# Patient Record
Sex: MALE | Race: BLACK OR AFRICAN AMERICAN | NOT HISPANIC OR LATINO | Employment: OTHER | URBAN - METROPOLITAN AREA
[De-identification: names, ages, dates, MRNs, and addresses within clinical notes are randomized per-mention and may not be internally consistent; named-entity substitution may affect disease eponyms.]

---

## 2017-01-06 DIAGNOSIS — M79.605 LEFT LEG PAIN: ICD-10-CM

## 2017-01-06 DIAGNOSIS — G54.6 PHANTOM LIMB PAIN: ICD-10-CM

## 2017-01-06 RX ORDER — GABAPENTIN 600 MG/1
600 TABLET ORAL 3 TIMES DAILY
Qty: 180 TABLET | Refills: 1 | Status: SHIPPED | OUTPATIENT
Start: 2017-01-06 | End: 2017-03-23

## 2017-01-12 ENCOUNTER — HOSPITAL ENCOUNTER (OUTPATIENT)
Facility: HOSPITAL | Age: 52
Discharge: HOME | End: 2017-01-12
Attending: INTERNAL MEDICINE | Admitting: INTERNAL MEDICINE
Payer: MEDICARE

## 2017-01-12 VITALS
HEART RATE: 72 BPM | TEMPERATURE: 96 F | HEIGHT: 66 IN | WEIGHT: 178 LBS | OXYGEN SATURATION: 97 % | RESPIRATION RATE: 16 BRPM | BODY MASS INDEX: 28.61 KG/M2 | DIASTOLIC BLOOD PRESSURE: 93 MMHG | SYSTOLIC BLOOD PRESSURE: 168 MMHG

## 2017-01-12 DIAGNOSIS — T82.858D STENOSIS OF ARTERIOVENOUS DIALYSIS FISTULA, SUBSEQUENT ENCOUNTER: Primary | ICD-10-CM

## 2017-01-12 PROCEDURE — 25000003 PHARM REV CODE 250: Performed by: INTERNAL MEDICINE

## 2017-01-12 PROCEDURE — 25000003 PHARM REV CODE 250

## 2017-01-12 PROCEDURE — 63600175 PHARM REV CODE 636 W HCPCS

## 2017-01-12 PROCEDURE — 36901 INTRO CATH DIALYSIS CIRCUIT: CPT | Mod: ,,, | Performed by: INTERNAL MEDICINE

## 2017-01-12 RX ORDER — CLONIDINE HYDROCHLORIDE 0.1 MG/1
0.1 TABLET ORAL ONCE
Status: COMPLETED | OUTPATIENT
Start: 2017-01-12 | End: 2017-01-12

## 2017-01-12 RX ADMIN — CLONIDINE HYDROCHLORIDE 0.1 MG: 0.1 TABLET ORAL at 08:01

## 2017-01-12 NOTE — PROGRESS NOTES
Pt verbalized an understanding of discharge instructions including diet, s/s to notify md, post procedure care, and follow up.  Right upper arm +thrill and bruit dressing clean, dry and intact. +pulse, no hematoma. Pt refused wheel chair and ambulated off unit.

## 2017-01-12 NOTE — DISCHARGE SUMMARY
[facility]  Discharge Note  Short Stay    Admit Date: 1/12/2017    Discharge Date and Time: No discharge date for patient encounter.     Attending Physician: Grayson Hubbard MD     Discharge Provider: Grayson Hubbard    Diagnoses: Fistulagram of the right BC AVF showed no evidence of stenosis.  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       Discharged Condition: good    Hospital Course: Patient was admitted for an outpatient procedure and tolerated the procedure well with no complications.    Final Diagnoses: Same as principal problem.    Disposition: Home or Self Care    Follow up/Patient Instructions:    Medications:  Reconciled Home Medications:   Current Discharge Medication List      CONTINUE these medications which have NOT CHANGED    Details   amlodipine (NORVASC) 10 MG tablet Take 1 tablet (10 mg total) by mouth every morning.  Qty: 90 tablet, Refills: 3      carvedilol (COREG) 12.5 MG tablet Take 1 tablet (12.5 mg total) by mouth 2 (two) times daily.  Qty: 60 tablet, Refills: 11      cinacalcet (SENSIPAR) 60 MG Tab Take 1 tablet (60 mg total) by mouth daily with breakfast.  Qty: 30 tablet, Refills: 3      docusate sodium (COLACE) 100 MG capsule Take 1 capsule (100 mg total) by mouth 2 (two) times daily.  Refills: 0      duloxetine (CYMBALTA) 30 MG capsule Take 1 capsule (30 mg total) by mouth once daily. In 1-2 weeks, if no relief, increase to 2 capsules once daily. Call for refills.  Qty: 30 capsule, Refills: 0    Associated Diagnoses: Phantom limb pain; Diabetic polyneuropathy associated with type 2 diabetes mellitus      gabapentin (NEURONTIN) 100 MG capsule Take 1 capsule (100 mg total) by mouth 3 (three) times daily.  Qty: 90 capsule, Refills: 3      lanthanum (FOSRENOL) 1000 MG chewable tablet Take 1 tablet (1,000 mg total)  by mouth 3 (three) times daily with meals.  Qty: 60 tablet, Refills: 11    Comments: Please dispense the powder form equivalent to the above sig. The electronic medical record has not updated the pharmacy to include the powder form. Thank you      lisinopril (PRINIVIL,ZESTRIL) 20 MG tablet Take 1 tablet (20 mg total) by mouth once daily.  Qty: 90 tablet, Refills: 3      gabapentin (NEURONTIN) 600 MG tablet Take 1 tablet (600 mg total) by mouth 3 (three) times daily.  Qty: 180 tablet, Refills: 1    Associated Diagnoses: Phantom limb pain; Left leg pain      ketoconazole (NIZORAL) 2 % shampoo Wash hair and affected areas of skin with medicated shampoo at least 2x/week - let sit on skin at least 5 minutes prior to rinsing  Qty: 120 mL, Refills: 5    Associated Diagnoses: Pityrosporum folliculitis      pantoprazole (PROTONIX) 40 MG tablet Take 1 tablet (40 mg total) by mouth once daily.  Qty: 30 tablet, Refills: 3      sodium chloride (OCEAN) 0.65 % nasal spray 1 spray by Nasal route 2 (two) times daily.  Refills: 12      tramadol (ULTRAM) 50 mg tablet Take 1 tablet (50 mg total) by mouth 3 (three) times daily as needed for Pain.  Qty: 90 tablet, Refills: 2    Associated Diagnoses: Chronic midline low back pain with left-sided sciatica           No discharge procedures on file.      Discharge Procedure Orders; Resume previous diet and activity. Follow up as needed.

## 2017-01-12 NOTE — H&P
[facility]  History & Physical    Subjective:      Chief Complaint/Reason for Admission:Prolonged bleeding after HD needle withdrawal.     Vaughn Retana is a 51 y.o. male with right BC AVF here for evaluation of prolonged bleeding after HD needle with drawl. He denies any other symptoms.    Past Medical History   Diagnosis Date    Acute on chronic diastolic CHF (congestive heart failure) 9/14/2016    Anemia     Chronic low back pain 12/1/2015    Diabetes mellitus, type II     Diabetic neuropathy     ESRD on hemodialysis     Hepatitis C      states hepatitis B    Hypertension     Left basal ganglia ICH 5/6/2013    Metabolic acidosis, IAG, reduced excretion of inorganic acids     Myoclonic jerking 9/20/2016    Obesity     Secondary hyperparathyroidism (of renal origin)     TB lung, latent 8/25/2015    Thyroid disease      Past Surgical History   Procedure Laterality Date    R avf  9/12/12    Leg amputation through knee  12/18/2013     left BKA    Foot amputation through metatarsal       left foot    Colonoscopy       Family History   Problem Relation Age of Onset    Early death Mother     Kidney disease Father     Hypertension Father     Heart disease Father     Hyperlipidemia Father     Diabetes Brother     Alcohol abuse Maternal Grandmother     Diabetes Maternal Grandfather     Melanoma Neg Hx      Social History   Substance Use Topics    Smoking status: Former Smoker     Packs/day: 1.00     Years: 10.00    Smokeless tobacco: Never Used    Alcohol use No       PTA Medications   Medication Sig    amlodipine (NORVASC) 10 MG tablet Take 1 tablet (10 mg total) by mouth every morning.    carvedilol (COREG) 12.5 MG tablet Take 1 tablet (12.5 mg total) by mouth 2 (two) times daily.    cinacalcet (SENSIPAR) 60 MG Tab Take 1 tablet (60 mg total) by mouth daily with breakfast.    docusate sodium (COLACE) 100 MG capsule Take 1 capsule (100 mg total) by mouth 2 (two)  times daily.    duloxetine (CYMBALTA) 30 MG capsule Take 1 capsule (30 mg total) by mouth once daily. In 1-2 weeks, if no relief, increase to 2 capsules once daily. Call for refills.    gabapentin (NEURONTIN) 100 MG capsule Take 1 capsule (100 mg total) by mouth 3 (three) times daily.    lanthanum (FOSRENOL) 1000 MG chewable tablet Take 1 tablet (1,000 mg total) by mouth 3 (three) times daily with meals.    lisinopril (PRINIVIL,ZESTRIL) 20 MG tablet Take 1 tablet (20 mg total) by mouth once daily.    gabapentin (NEURONTIN) 600 MG tablet Take 1 tablet (600 mg total) by mouth 3 (three) times daily.    ketoconazole (NIZORAL) 2 % shampoo Wash hair and affected areas of skin with medicated shampoo at least 2x/week - let sit on skin at least 5 minutes prior to rinsing    pantoprazole (PROTONIX) 40 MG tablet Take 1 tablet (40 mg total) by mouth once daily.    sodium chloride (OCEAN) 0.65 % nasal spray 1 spray by Nasal route 2 (two) times daily.    tramadol (ULTRAM) 50 mg tablet Take 1 tablet (50 mg total) by mouth 3 (three) times daily as needed for Pain.     Review of patient's allergies indicates:  No Known Allergies     ROS Constitutional: No fever or chills, no weight changes.  Eyes: No visual changes or photophobia  HEENT: No nasal congestion or sore throat  Respiratory: No cough or shortness of breath  Cardiovascular: No chest pain or palpitations  Gastrointestinal: Good appetite, no nausea or vomiting, no change in bowel habits  Genitourinary: No hematuria or dysuria  Skin: No rash or pruritis  Hematologic/lymphatic: No easy bruising, bleeding or lymphadenopathy  Musculoskeletal: No arthralgias or myalgias  Neurological: No seizures or tremors  Endocrine: No heat/cold intolerance.  No polyuria/polydipsia.  Psychiatric:  No depression or anxiety.    Objective:      Vital Signs (Most Recent)  Temp: 97.7 °F (36.5 °C) (01/12/17 0754)  Pulse: 76 (01/12/17 0754)  Resp: 16 (01/12/17 0754)  BP: (!) 199/103  (01/12/17 0754)  SpO2: 99 % (01/12/17 0754)    Vital Signs Range (Last 24H):  Temp:  [97.7 °F (36.5 °C)]   Pulse:  [76]   Resp:  [16]   BP: (199)/(103)   SpO2:  [99 %]     Physical Exam General appearance: Well developed, well nourished  Head: Normocephalic, atraumatic  Eyes:  Conjunctivae nl. Sclera anicteric. PERRL.  HEENT: Lips, mucosa, and tongue normal; teeth and gums normal and oropharynx clear.  Neck: Supple, trachea midline, thyroid not enlarged,   Lungs: Clear to auscultation bilaterally and normal respiratory effort  Heart: Regular rate and rhythm, S1, S2 normal, no murmur, click, rub or gallop  Abdomen: Soft, non-tender non-distended; bowel sounds normal; no masses,  no organomegaly  Extremities: No cyanosis or clubbing. No edema  Pulses: 2+ and symmetric  Skin: Skin color, texture, turgor normal. No rashes or lesions  Lymph nodes: Cervical, supraclavicular, and axillary nodes normal.  Neurologic: Normal strength and tone. No focal numbness or weakness  Psychiatric:  Alert and oriented times 3.  Affect appropriate.  Right BC AVF; water hammer pulse,High pitched dis cotinous systolic murmur.       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       Plan:    1. Fistulagram with PTA

## 2017-01-12 NOTE — PLAN OF CARE
Problem: Patient Care Overview  Goal: Plan of Care Review  Received report from ISMAEL Davis. Pt received from Cath lab via stretcher. Patient  AAOx3. VSS, no distress noted. Resp even and unlabored. Right upper arm dressing C/D/I. +pulses, No active bleeding. No hematoma noted. +thrill and bruit. Post procedure protocol reviewed with patient and patient's family. Understanding verbalized. Family members at bedside. Nurse call bell within reach. Will continue to monitor per post procedure protocol.

## 2017-01-12 NOTE — IP AVS SNAPSHOT
[facility]  [address]  [facility] [address]  Phone: [phone]           Patient Discharge Instructions     Our goal is to set you up for success. This packet includes information on your condition, medications, and your home care. It will help you to care for yourself so you don't get sicker and need to go back to the hospital.     Please ask your nurse if you have any questions.        There are many details to remember when preparing to leave the hospital. Here is what you will need to do:    1. Take your medicine. If you are prescribed medications, review your Medication List in the following pages. You may have new medications to  at the pharmacy and others that you'll need to stop taking. Review the instructions for how and when to take your medications. Talk with your doctor or nurses if you are unsure of what to do.     2. Go to your follow-up appointments. Specific follow-up information is listed in the following pages. Your may be contacted by a transition nurse or clinical provider about future appointments. Be sure we have all of the phone numbers to reach you, if needed. Please contact your provider's office if you are unable to make an appointment.     3. Watch for warning signs. Your doctor or nurse will give you detailed warning signs to watch for and when to call for assistance. These instructions may also include educational information about your condition. If you experience any of warning signs to your health, call your doctor.               [facility]  Unless otherwise directed by your provider, please contact [facility], our nurse care line that is available for 24/7 assistance.     [phone] (toll-free)    Registered nurses in the [facility] provide clinical advisement, health education, appointment booking, and other advisory services.                    ** Verify the list of medication(s) below is accurate and up  to date. Carry this with you in case of emergency. If your medications have changed, please notify your healthcare provider.             Medication List      ASK your doctor about these medications        Additional Info                      amlodipine 10 MG tablet   Commonly known as:  NORVASC   Quantity:  90 tablet   Refills:  3   Dose:  10 mg    Instructions:  Take 1 tablet (10 mg total) by mouth every morning.     Begin Date    AM    Noon    PM    Bedtime       carvedilol 12.5 MG tablet   Commonly known as:  COREG   Quantity:  60 tablet   Refills:  11   Dose:  12.5 mg    Instructions:  Take 1 tablet (12.5 mg total) by mouth 2 (two) times daily.     Begin Date    AM    Noon    PM    Bedtime       cinacalcet 60 MG Tab   Commonly known as:  SENSIPAR   Quantity:  30 tablet   Refills:  3   Dose:  60 mg    Instructions:  Take 1 tablet (60 mg total) by mouth daily with breakfast.     Begin Date    AM    Noon    PM    Bedtime       docusate sodium 100 MG capsule   Commonly known as:  COLACE   Refills:  0   Dose:  100 mg    Instructions:  Take 1 capsule (100 mg total) by mouth 2 (two) times daily.     Begin Date    AM    Noon    PM    Bedtime       duloxetine 30 MG capsule   Commonly known as:  CYMBALTA   Quantity:  30 capsule   Refills:  0   Dose:  30 mg    Instructions:  Take 1 capsule (30 mg total) by mouth once daily. In 1-2 weeks, if no relief, increase to 2 capsules once daily. Call for refills.     Begin Date    AM    Noon    PM    Bedtime       * gabapentin 100 MG capsule   Commonly known as:  NEURONTIN   Quantity:  90 capsule   Refills:  3   Dose:  100 mg    Instructions:  Take 1 capsule (100 mg total) by mouth 3 (three) times daily.     Begin Date    AM    Noon    PM    Bedtime       * gabapentin 600 MG tablet   Commonly known as:  NEURONTIN   Quantity:  180 tablet   Refills:  1   Dose:  600 mg    Instructions:  Take 1 tablet (600 mg total) by mouth 3 (three) times daily.     Begin Date    AM    Noon    PM     Bedtime       ketoconazole 2 % shampoo   Commonly known as:  NIZORAL   Quantity:  120 mL   Refills:  5    Instructions:  Wash hair and affected areas of skin with medicated shampoo at least 2x/week - let sit on skin at least 5 minutes prior to rinsing     Begin Date    AM    Noon    PM    Bedtime       lanthanum 1000 MG chewable tablet   Commonly known as:  FOSRENOL   Quantity:  60 tablet   Refills:  11   Dose:  1000 mg   Indications:  Renal Osteodystrophy with Hyperphosphatemia   Comments:  Please dispense the powder form equivalent to the above sig. The electronic medical record has not updated the pharmacy to include the powder form. Thank you    Instructions:  Take 1 tablet (1,000 mg total) by mouth 3 (three) times daily with meals.     Begin Date    AM    Noon    PM    Bedtime       lisinopril 20 MG tablet   Commonly known as:  PRINIVIL,ZESTRIL   Quantity:  90 tablet   Refills:  3   Dose:  20 mg    Instructions:  Take 1 tablet (20 mg total) by mouth once daily.     Begin Date    AM    Noon    PM    Bedtime       pantoprazole 40 MG tablet   Commonly known as:  PROTONIX   Quantity:  30 tablet   Refills:  3   Dose:  40 mg    Instructions:  Take 1 tablet (40 mg total) by mouth once daily.     Begin Date    AM    Noon    PM    Bedtime       sodium chloride 0.65 % nasal spray   Commonly known as:  OCEAN   Refills:  12   Dose:  1 spray    Instructions:  1 spray by Nasal route 2 (two) times daily.     Begin Date    AM    Noon    PM    Bedtime       tramadol 50 mg tablet   Commonly known as:  ULTRAM   Quantity:  90 tablet   Refills:  2   Dose:  50 mg    Instructions:  Take 1 tablet (50 mg total) by mouth 3 (three) times daily as needed for Pain.     Begin Date    AM    Noon    PM    Bedtime       * Notice:  This list has 2 medication(s) that are the same as other medications prescribed for you. Read the directions carefully, and ask your doctor or other care provider to review them with you.               Please bring  "to all follow up appointments:    1. A copy of your discharge instructions.  2. All medicines you are currently taking in their original bottles.  3. Identification and insurance card.    Please arrive 15 minutes ahead of scheduled appointment time.    Please call 24 hours in advance if you must reschedule your appointment and/or time.        Your Scheduled Appointments     Mar 15, 2017  8:40 AM CDT   Established Patient Visit with Janna James MD   [facility] ([facility] )    [address]  [place] [address] [phone]                  Admission Information     Date & Time Provider Department CSN    1/12/2017  7:40 AM Grayson Hubbard MD [facility] [id number]      Care Providers     Provider Role Specialty Primary office phone    Grayson Hubbard MD Attending Provider Nephrology [phone]      Your Vitals Were     BP Pulse Temp Resp Height Weight    168/93 (BP Location: Left arm, Patient Position: Sitting, BP Method: Automatic) 72 95.8 °F (35.4 °C) (Oral) 16 5' 6" (1.676 m) 80.7 kg (178 lb)    SpO2 BMI             97% 28.73 kg/m2         Recent Lab Values        5/6/2009 4/1/2012                        8:00 AM  6:00 AM          A1C 6.3 (H) text          Comment for A1C at  6:00 AM on 4/1/2012:  Hemoglobin A1C: SENT TO [place] Possible contamination of HgbA1C by a Hemoglobin variant. This specimen was sent to [facility] for analysis by an alternate method.       Allergies as of 1/12/2017     No Known Allergies      Advance Directives     An advance directive is a document which, in the event you are no longer able to make decisions for yourself, tells your healthcare team what kind of treatment you do or do not want to receive, or who you would like to make those decisions for you.  If you do not currently have an advance directive, [facility] encourages you to create one.  For more information call:  [phone] ([phone]), " [phone] ([phone]),  or log on to [email or web address].        Smoking Cessation     If you would like to quit smoking:   You may be eligible for free services if you are a [place] resident and started smoking cigarettes before September 1, 1988.  Call the Smoking Cessation Trust (SCT) toll free at (621) 040-5392 or (407) [phone].   Call 9-800-QUIT-NOW if you do not meet the above criteria.            Language Assistance Services     ATTENTION: Language assistance services are available, free of charge. Please call [phone].      ATENCIÓN: Si habla español, tiene a engle disposición servicios gratuitos de asistencia lingüística. Llame al [phone].     CHÚ Ý: N?u b?n nói Ti?ng Vi?t, có các d?ch v? h? tr? ngôn ng? mi?n phí dành cho b?n. G?i s? [phone].        Stroke Education              Heart Failure Education       Heart Failure: Being Active  You have a condition called heart failure. Being active doesnt mean that you have to wear yourself out. Even a little movement each day helps to strengthen your heart. If you cant get out to exercise, you can do simple stretching and strengthening exercises at home. These are good ways to keep you well-conditioned and prevent you and your heart from becoming excessively weak.    Ideas to get you started  · Add a little movement to things you do now. Walk to mail letters. Park your car at the far end of the parking lot and walk to the store. Walk up a flight of stairs instead of taking the elevator.  · Choose activities you enjoy. You might walk, swim, or ride an exercise bike. Things like gardening and washing the car count, too. Other possibilities include: washing dishes, walking the dog, walking around the mall, and doing aerobic activities with friends.  · Join a group exercise program at a [facility] or [facility], a senior center, or a community center. Or look into a hospital cardiac rehabilitation program. Ask your doctor if you  qualify.  Tips to keep you going  · Get up and get dressed each day. Go to a coffee shop and read a newspaper or go somewhere that you'll be in the presence of other active people. Youll feel more like being active.  · Make a plan. Choose one or more activities that you enjoy and that you can easily do. Then plan to do at least one each day. You might write your plan on a calendar.  · Go with a friend or a group if you like company. This can help you feel supported and stay motivated, too.  · Plan social events that you enjoy. This will keep you mentally engaged as well as physically motivated to do things you find pleasure in.  For your safety  · Talk with your healthcare provider before starting an exercise program.  · Exercise indoors when its too hot or too cold outside, or when the air quality is poor. Try walking at a shopping mall.  · Wear socks and sturdy shoes to maintain your balance and prevent falls.  · Start slowly. Do a few minutes several times a day at first. Increase your time and speed little by little.  · Stop and rest whenever you feel tired or get short of breath.  · Dont push yourself on days when you dont feel well.  © 9898-5250 [employer]. [address] [place] [address], [place] [address]. All rights reserved. This information is not intended as a substitute for professional medical care. Always follow your healthcare professional's instructions.              Heart Failure: Evaluating Your Heart  You have a condition called heart failure. To evaluate your condition, your doctor will examine you, ask questions, and do some tests. Along with looking for signs of heart failure, the doctor looks for any other health problems that may have led to heart failure. The results of your evaluation will help your doctor form a treatment plan.  Health history and physical exam  Your visit will start with a health history. Tell the doctor about any symptoms youve noticed and about all medicines  you take. Then youll have a physical exam. This includes listening to your heartbeat and breathing. Youll also be checked for swelling (edema) in your legs and neck. When you have fluid buildup or fluid in the lungs, it may be called congestive heart failure.  Diagnosing heart failure     During an echocardiogram, sound waves bounce off the heart. These are converted into a picture on the screen.   The following may be done to help your doctor form a diagnosis:  · X-rays show the size and shape of your heart. These pictures can also show fluid in your lungs.  · An electrocardiogram (ECG or EKG) shows the pattern of your heartbeat. Small pads (electrodes) are placed on your chest, arms, and legs. Wires connect the pads to the ECG machine, which records your hearts electrical signals. This can give the doctor information about heart function.  · An echocardiogram uses ultrasound waves to show the structure and movement of your heart muscle. This shows how well the heart pumps. It also shows the thickness of the heart walls, and if the heart is enlarged. It is one of the most useful, non-invasive tests as it provides information about the heart's general function. This helps your doctor make treatment decisions.  · Lab tests evaluate small amounts of blood or urine for signs of problems. A BNP lab test can help diagnose and evaluate heart failure. BNP stands for B-type natriuretic peptide. The ventricles secrete more BNP when heart failure worsens. Lab tests can also provide information about metabolic dysfunction or heart dysfunction.  Your treatment plan  Based on the results of your evaluation and tests, your doctor will develop a treatment plan. This plan is designed to relieve some of your heart failure symptoms and help make you more comfortable. Your treatment plan may include:  · Medicine to help your heart work better and improve your quality of life  · Changes in what you eat and drink to help prevent  fluid from backing up in your body  · Daily monitoring of your weight and heart failure symptoms to see how well your treatment plan is working  · Exercise to help you stay healthy  · Help with quitting smoking  · Emotional and psychological support to help adjust to the changes  · Referrals to other specialists to make sure you are being treated comprehensively  © 5019-5856 [employer]. [address] [place] [address], [place] [address]. All rights reserved. This information is not intended as a substitute for professional medical care. Always follow your healthcare professional's instructions.              Heart Failure: Making Changes to Your Diet  You have a condition called heart failure. When you have heart failure, excess fluid is more likely to build up in your body because your heart isn't working well. This makes the heart work harder to pump blood. Fluid buildup causes symptoms such as shortness of breath and swelling (edema). This is often referred to as congestive heart failure or CHF. Controlling the amount of salt (sodium) you eat may help stop fluid from building up. Your doctor may also tell you to reduce the amount of fluid you drink.  Reading food labels    Your healthcare provider will tell you how much sodium you can eat each day. Read food labels to keep track. Keep in mind that certain foods are high in salt. These include canned, frozen, and processed foods. Check the amount of sodium in each serving. Watch out for high-sodium ingredients. These include MSG (monosodium glutamate), baking soda, and sodium phosphate.   Eating less salt  Give yourself time to get used to eating less salt. It may take a little while. Here are some tips to help:  · Take the saltshaker off the table. Replace it with salt-free herb mixes and spices.  · Eat fresh or plain frozen vegetables. These have much less salt than canned vegetables.  · Choose low-sodium snacks like sodium-free pretzels, crackers, or  air-popped popcorn.  · Dont add salt to your food when youre cooking. Instead, season your foods with pepper, lemon, garlic, or onion.  · When you eat out, ask that your food be cooked without added salt.  · Avoid eating fried foods as these often have a great deal of salt.  If youre told to limit fluids  You may need to limit how much fluid you have to help prevent swelling. This includes anything that is liquid at room temperature, such as ice cream and soup. If your doctor tells you to limit fluid, try these tips:  · Measure drinks in a measuring cup before you drink them. This will help you meet daily goals.  · Chill drinks to make them more refreshing.  · Suck on frozen lemon wedges to quench thirst.  · Only drink when youre thirsty.  · Chew sugarless gum or suck on hard candy to keep your mouth moist.  · Weigh yourself daily to know if your body's fluid content is rising.  My sodium goal  Your healthcare provider may give you a sodium goal to meet each day. This includes sodium found in food as well as salt that you add. My goal is to eat no more than ___________ mg of sodium per day.     When to call your doctor  Call your doctor right away if you have any symptoms of worsening heart failure. These can include:  · Sudden weight gain  · Increased swelling of your legs or ankles  · Trouble breathing when youre resting or at night  · Increase in the number of pillows you have to sleep on  · Chest pain, pressure, discomfort, or pain in the jaw, neck, or back   © 8512-2811 [employer]. [address] [place] [address], [place] [address]. All rights reserved. This information is not intended as a substitute for professional medical care. Always follow your healthcare professional's instructions.              Heart Failure: Medicines to Help Your Heart    You have a condition called heart failure (also known as congestive heart failure, or CHF). Your doctor will likely prescribe medicines for heart failure  and any underlying health problems you have. Most heart failure patients take one or more types of medicinen. Your healthcare provider will work to find the combination of medicines that works best for you.  Heart failure medicines  Here are the most common heart failure medicines:  · ACE inhibitors lower blood pressure and decrease strain on the heart. This makes it easier for the heart to pump. Angiotensin receptor blockers have similar effects. These are prescribed for some patients instead of ACE inhibitors.  · Beta-blockers relieve stress on the heart. They also improve symptoms. They may also improve the heart's pumping action over time.  · Diuretics (also called water pills) help rid your body of excess water. This can help rid your body of swelling (edema). Having less fluid to pump means your heart doesnt have to work as hard. Some diuretics make your body lose a mineral called potassium. Your doctor will tell you if you need to take supplements or eat more foods high in potassium.  · Digoxin helps your heart pump with more strength. This helps your heart pump more blood with each beat. So, more oxygen-rich blood travels to the rest of the body.  · Aldosterone antagonists help alter hormones and decrease strain on the heart.  · Hydralazine and nitrates are two separate medicines used together to treat heart failure. They may come in one combination pill. They lower blood pressure and decrease how hard the heart has to pump.  Medicines for related conditions  Controlling other heart problems helps keep heart failure under control, too. Depending on other heart problems you have, medicines may be prescribed to:  · Lower blood pressure (antihypertensives).  · Lower cholesterol levels (statins).  · Prevent blood clots (anticoagulants or aspirin).  · Keep the heartbeat steady (antiarrhythmics).  © 7281-6688 [employer]. [address] [place] [address], [place] [address]. All rights reserved. This  information is not intended as a substitute for professional medical care. Always follow your healthcare professional's instructions.              Heart Failure: Procedures That May Help    The heart is a muscle that pumps oxygen-rich blood to all parts of the body. When you have heart failure, the heart is not able to pump as well as it should. Blood and fluid may back up into the lungs (congestive heart failure), and some parts of the body dont get enough oxygen-rich blood to work normally. These problems lead to the symptoms of heart failure.     Certain procedures may help the heart pump better in some cases of heart failure. Some procedures are done to treat health problems that may have caused the heart failure such as coronary artery disease or heart rhythm problems. For more serious heart failure, other options are available.  Treating artery and valve problems  If you have coronary artery disease or valve disease, procedures may be done to improve blood flow. This helps the heart pump better, which can improve heart failure symptoms. First, your doctor may do a cardiac catheterization to help detect clogged blood vessels or valve damage. During this procedure, a  thin tube (catheter) in inserted into a blood vessel and guided to the heart. There a dye is injected and a special type of X-ray (angiogram) is taken of the blood vessels. Procedures to open a blocked artery or fix damaged valves can also be done using catheterization.  · Angioplasty uses a balloon-tipped instrument at the end of the catheter. The balloon is inflated to widen the narrowed artery. In many cases, a stent is expanded to further support the narrowed artery. A stent is a metal mesh tube.  · Valve surgery repairs or replacement of faulty valves can also be done during catheterization so blood can flow properly through the chambers of the heart.  Bypass surgery is another option to help treat blocked arteries. It uses a healthy blood  vessel from elsewhere in the body. The healthy blood vessel is attached above and below the blocked area so that blood can flow around the blocked artery.  Treating heart rhythm problems  A device may be placed in the chest to help a weak heart maintain a healthy, heartbeat so the heart can pump more effectively:  · Pacemaker. A pacemaker is an implanted device that regulates your heartbeat electronically. It monitors your heart's rhythm and generates a painless electric impulse that helps the heart beat in a regular rhythm. A pacemaker is programmed to meet your specific heart rhythm needs.  · Biventricular pacing/cardiac resynchronization therapy. A type of pacemaker that paces both pumping chambers of the heart at the same time to coordinate contractions and to improve the heart's function. Some people with heart failure are candidates for this therapy.  · Implantable cardioverter defibrillator. A device similar to a pacemaker that senses when the heart is beating too fast and delivers an electrical shock to convert the fast rhythm to a normal rhythm. This can be a life saving device.  In severe cases  In more serious cases of heart failure when other treatments no longer work, other options may include:  · Ventricular assist devices (VADs). These are mechanical devices used to take over the pumping function for one or both of the heart's ventricles, or pumping chambers. A VAD may be necessary when heart failure progresses to the point that medicines and other treatments no longer help. In some cases, a VAD may be used as a bridge to transplant.  · Heart transplant. This is replacing the diseased heart with a healthy one from a donor. This is an option for a few people who are very sick. A heart transplant is very serious and not an option for all patients. Your doctor can tell you more.  © 3023-8947 [employer]. [address] [place] [address], [place] [address]. All rights reserved. This information is not  intended as a substitute for professional medical care. Always follow your healthcare professional's instructions.              Heart Failure: Tracking Your Weight  You have a condition called heart failure. When you have heart failure, a sudden weight gain or a steady rise in weight is a warning sign that your body is retaining too much water and salt. This could mean your heart failure is getting worse. If left untreated, it can cause problems for your lungs and result in shortness of breath. Weighing yourself each day is the best way to know if youre retaining water. If your weight goes up quickly, call your doctor. You will be given instructions on how to get rid of the excess water. You will likely need medicines and to avoid salt. This will help your heart work better.  Call your doctor if you gain more than 2 pounds in 1 day, more than 5 pounds in 1 week, or whatever weight gain you were told to report by your doctor. This is often a sign of worsening heart failure and needs to be evaluated and treated. Your doctor will tell you what to do next.   Tips for weighing yourself    · Weigh yourself at the same time each morning, wearing the same clothes. Weigh yourself after urinating and before eating.  · Use the same scale each day. Make sure the numbers are easy to read. Put the scale on a flat, hard surface -- not on a rug or carpet.  · Do not stop weighing yourself. If you forget one day, weigh again the next morning.  How to use your weight chart  · Keep your weight chart near the scale. Write your weight on the chart as soon as you get off the scale.  · Fill in the month and the start date on the chart. Then write down your weight each day. Your chart will look like this:    · If you miss a day, leave the space blank. Weigh yourself the next day and write your weight in the next space.  · Take your weight chart with you when you go to see your doctor.  © 6578-5057 [employer]. [address] [place] [address]  [place] [address]. All rights reserved. This information is not intended as a substitute for professional medical care. Always follow your healthcare professional's instructions.              Heart Failure: Warning Signs of a Flare-Up  You have a condition called heart failure. Once you have heart failure, flare-ups can happen. Below are signs that can mean your heart failure is getting worse. If you notice any of these warning signs, call your healthcare provider.  Swelling    · Your feet, ankles, or lower legs get puffier.  · You notice skin changes on your lower legs.  · Your shoes feel too tight.  · Your clothes are tighter in the waist.  · You have trouble getting rings on or off your fingers.  Shortness of breath  · You have to breathe harder even when youre doing your normal activities or when youre resting.  · You are short of breath walking up stairs or even short distances.  · You wake up at night short of breath or coughing.  · You need to use more pillows or sit up to sleep.  · You wake up tired or restless.  Other warning signs  · You feel weaker, dizzy, or more tired.  · You have chest pain or changes in your heartbeat.  · You have a cough that wont go away.  · You cant remember things or dont feel like eating.  Tracking your weight  Gaining weight is often the first warning sign that heart failure is getting worse. Gaining even a few pounds can be a sign that your body is retaining excess water and salt. Weighing yourself each day in the morning after you urinate and before you eat, is the best way to know if you're retaining water. Get a scale that is easy to read and make sure you wear the same clothes and use the same scale every time you weigh. Your healthcare provider will show you how to track your weight. Call your doctor if you gain more than 2 pounds in 1 day, 5 pounds in 1 week, or whatever weight gain you were told to report by your doctor. This is often a sign of worsening  heart failure and needs to be evaluated and treated before it compromises your breathing. Your doctor will tell you what to do next.    © 9843-5415 [employer]. [address] [place] [address], [place] [address]. All rights reserved. This information is not intended as a substitute for professional medical care. Always follow your healthcare professional's instructions.              Pneumonmia Discharge Instructions                Chronic Kindey Disease Education             Diabetes Discharge Instructions                                    [facility] complies with applicable Federal civil rights laws and does not discriminate on the basis of race, color, national origin, age, disability, or sex.

## 2017-01-12 NOTE — PLAN OF CARE
Problem: Patient Care Overview  Goal: Plan of Care Review  Outcome: Ongoing (interventions implemented as appropriate)  Admit assessment completed. IV placed x 1.  Plan of care initiated. Report given to ISMAEL Aragon.  Will continue to monitor.

## 2017-01-12 NOTE — IP AVS SNAPSHOT
[address]  [place] [address]  Phone: [phone]           I have received a copy of my After Visit Summary and discharge instructions from [facility].    INSTRUCTIONS RECEIVED AND UNDERSTOOD BY:                     Patient/Patient Representative: ________________________________________________________________     Date/Time: ________________________________________________________________                     Instructions Given By: ________________________________________________________________     Date/Time: ________________________________________________________________

## 2017-01-12 NOTE — BRIEF OP NOTE
[facility]  Brief Operative Note     SUMMARY     Surgery Date: 1/12/2017     Surgeon(s) and Role:     * Grayson Hubbard MD - Primary    Assisting Surgeon: None    Pre-op Diagnosis:  Malfunction of arteriovenous dialysis fistula, subsequent encounter [T82.032D]    Post-op Diagnosis:  Fistulagram of the right BC AVF showed no evidence of stensosis.  Procedure(s) (LRB):  FISTULOGRAM (Right)    Anesthesia: RN IV Sedation    Estimated Blood Loss: 5 ml.       Specimens:   Specimen     None

## 2017-01-13 ENCOUNTER — HOME CARE VISIT (OUTPATIENT)
Dept: NEUROLOGY | Facility: HOSPITAL | Age: 52
End: 2017-01-13

## 2017-01-13 NOTE — OP NOTE
DATE OF PROCEDURE:  01/12/2017.    PROCEDURE TYPE:  Fistulogram of right upper extremity brachiocephalic fistula.    INDICATION:  Prolonged bleeding after dialysis needle withdrawal and high venous   pressures.    OPERATORS:  1.  Grayson Hubbard M.D.  2.  Carlos Delcid M.D., assisting the case as there was no qualified   resident.    POSTPROCEDURE DIAGNOSES:  1.  Superior venacavogram.  2.  Subclavian venogram.  Patent stent in the cephalic and subclavian   axillo-subclavian area.  Patent stent in cephalic arch in axillary and   subclavian area and subclavian innominate vein stenosis around 20%.  Following   by that, rest of the cephalic vein was patent and the reflux arteriogram   revealed no evidence of any inflow stenosis.    PROCEDURE NOTE IN DETAIL:  After informed consent was obtained from Mr. Vaughn Retana, he was brought back and placed in a supine position.  The area of his   right upper extremity brachiocephalic fistula was cleaned and draped in the   usual sterile fashion.  After achieving local anesthesia with lidocaine and   epinephrine, access was cannulated with a micropuncture needle in antegrade   direction.  Following that 0.018 wire was advanced and covered established.    Multiple angiographic runs revealed there was patent superior vena cava,   subclavian vein, axillary vein.  Stent was patent in the axillary and cephalic   vein, which was patent and the rest of the cephalic vein was patent and reflux   arteriogram revealed no evidence of any inflow stenosis.  The subclavian vein   was stenosed around 20%, but given asymptomatic and angiographically   nonsignificant no intervention was performed.  The patient tolerated the   procedure well.  No immediate complication.  Estimated blood loss was 5 mL.  The   5-Haitian sheath was removed and hemostasis was secured by applying pressure on   it and no immediate complication.  Estimated blood loss was less than 5 mL and   discharged from [facility] in a stable condition.    PLAN:  Per KDIGO recommendations, we will do surveillance and monitoring.  If   there is any abnormality, we will bring him back earlier, otherwise we will   follow him on an as needed basis.  Given his prolonged bleeding after dialysis   needle withdrawal, we will adjust his antihypertensive medication regimen too as   it was significantly elevated this morning.  We will review his medications and   adjust it accordingly.      DRK/HN  dd: 01/12/2017 15:17:31 (CST)  td: 01/12/2017 17:59:43 (CST)  Doc ID   #[id number]  Job ID #[id number]    CC:

## 2017-01-23 DIAGNOSIS — E11.42 DIABETIC POLYNEUROPATHY ASSOCIATED WITH TYPE 2 DIABETES MELLITUS: ICD-10-CM

## 2017-01-23 DIAGNOSIS — G54.6 PHANTOM LIMB PAIN: ICD-10-CM

## 2017-01-23 RX ORDER — DULOXETIN HYDROCHLORIDE 30 MG/1
30 CAPSULE, DELAYED RELEASE ORAL DAILY
Qty: 30 CAPSULE | Refills: 1 | Status: SHIPPED | OUTPATIENT
Start: 2017-01-23 | End: 2017-02-10 | Stop reason: SDUPTHER

## 2017-02-10 DIAGNOSIS — G54.6 PHANTOM LIMB PAIN: Primary | ICD-10-CM

## 2017-02-10 DIAGNOSIS — G89.29 CHRONIC MIDLINE LOW BACK PAIN WITH LEFT-SIDED SCIATICA: ICD-10-CM

## 2017-02-10 DIAGNOSIS — E11.42 DIABETIC POLYNEUROPATHY ASSOCIATED WITH TYPE 2 DIABETES MELLITUS: ICD-10-CM

## 2017-02-10 DIAGNOSIS — M54.42 CHRONIC MIDLINE LOW BACK PAIN WITH LEFT-SIDED SCIATICA: ICD-10-CM

## 2017-02-10 RX ORDER — HYDROCODONE BITARTRATE AND ACETAMINOPHEN 5; 325 MG/1; MG/1
1 TABLET ORAL 2 TIMES DAILY PRN
Qty: 60 TABLET | Refills: 0 | Status: SHIPPED | OUTPATIENT
Start: 2017-02-10 | End: 2017-03-12

## 2017-02-10 RX ORDER — DULOXETIN HYDROCHLORIDE 30 MG/1
60 CAPSULE, DELAYED RELEASE ORAL DAILY
Start: 2017-02-10 | End: 2017-03-23

## 2017-02-10 RX ORDER — LISINOPRIL 20 MG/1
40 TABLET ORAL DAILY
Qty: 90 TABLET | Refills: 3 | Status: SHIPPED | OUTPATIENT
Start: 2017-02-10 | End: 2017-03-23 | Stop reason: SDUPTHER

## 2017-02-10 NOTE — TELEPHONE ENCOUNTER
The pt came to the clinic w/o appt due to increase in his back pain & phantom limb pain.  He was asked to continue gabapentin 100 mg po tid, increase duloxetine to 30 mg, 2 capsules po qd.  Tramadol is not helping. He was started on hydrocodone/apap 5/325 po bid prn.

## 2017-02-15 NOTE — PROGRESS NOTES
Mazin PV completed. He underwent a fistula repair on yesterday. Denies any new symptoms, or medication changes. Dialysis completed this am.

## 2017-02-22 ENCOUNTER — HOSPITAL ENCOUNTER (INPATIENT)
Facility: HOSPITAL | Age: 52
LOS: 3 days | Discharge: HOME HEALTH | DRG: 064 | End: 2017-02-25
Attending: EMERGENCY MEDICINE | Admitting: PSYCHIATRY & NEUROLOGY
Payer: MEDICARE

## 2017-02-22 DIAGNOSIS — N18.6 ESRD ON HEMODIALYSIS: Primary | Chronic | ICD-10-CM

## 2017-02-22 DIAGNOSIS — I61.9 LEFT-SIDED NONTRAUMATIC INTRACEREBRAL HEMORRHAGE: ICD-10-CM

## 2017-02-22 DIAGNOSIS — R11.2 NAUSEA & VOMITING: ICD-10-CM

## 2017-02-22 DIAGNOSIS — E78.5 DYSLIPIDEMIA: Chronic | ICD-10-CM

## 2017-02-22 DIAGNOSIS — I10 ESSENTIAL HYPERTENSION: Chronic | ICD-10-CM

## 2017-02-22 DIAGNOSIS — Z99.2 ESRD ON HEMODIALYSIS: Primary | Chronic | ICD-10-CM

## 2017-02-22 DIAGNOSIS — I61.0 NONTRAUMATIC SUBCORTICAL HEMORRHAGE OF LEFT CEREBRAL HEMISPHERE: ICD-10-CM

## 2017-02-22 DIAGNOSIS — I61.2 NONTRAUMATIC HEMORRHAGE OF LEFT CEREBRAL HEMISPHERE: ICD-10-CM

## 2017-02-22 DIAGNOSIS — G43.A0 CYCLICAL VOMITING WITH NAUSEA, INTRACTABILITY OF VOMITING NOT SPECIFIED: ICD-10-CM

## 2017-02-22 DIAGNOSIS — I61.5 LEFT-SIDED NONTRAUMATIC INTRAVENTRICULAR INTRACEREBRAL HEMORRHAGE: ICD-10-CM

## 2017-02-22 DIAGNOSIS — I50.32 CHRONIC DIASTOLIC HEART FAILURE: Chronic | ICD-10-CM

## 2017-02-22 LAB
ALBUMIN SERPL BCP-MCNC: 3.5 G/DL
ALP SERPL-CCNC: 176 U/L
ALT SERPL W/O P-5'-P-CCNC: 21 U/L
ANION GAP SERPL CALC-SCNC: 20 MMOL/L
AST SERPL-CCNC: 28 U/L
BASOPHILS # BLD AUTO: 0.01 K/UL
BASOPHILS NFR BLD: 0.2 %
BILIRUB SERPL-MCNC: 1.5 MG/DL
BNP SERPL-MCNC: >4900 PG/ML
BUN SERPL-MCNC: 68 MG/DL
CALCIUM SERPL-MCNC: 10.2 MG/DL
CHLORIDE SERPL-SCNC: 93 MMOL/L
CK MB SERPL-MCNC: 1.8 NG/ML
CK MB SERPL-RTO: 4 %
CK SERPL-CCNC: 45 U/L
CO2 SERPL-SCNC: 22 MMOL/L
CREAT SERPL-MCNC: 7.2 MG/DL
DIFFERENTIAL METHOD: ABNORMAL
EOSINOPHIL # BLD AUTO: 0.1 K/UL
EOSINOPHIL NFR BLD: 1.6 %
ERYTHROCYTE [DISTWIDTH] IN BLOOD BY AUTOMATED COUNT: 17 %
EST. GFR  (AFRICAN AMERICAN): 9.2 ML/MIN/1.73 M^2
EST. GFR  (NON AFRICAN AMERICAN): 7.9 ML/MIN/1.73 M^2
GLUCOSE SERPL-MCNC: 112 MG/DL
HCT VFR BLD AUTO: 41 %
HGB BLD-MCNC: 14.6 G/DL
LIPASE SERPL-CCNC: 42 U/L
LYMPHOCYTES # BLD AUTO: 1.2 K/UL
LYMPHOCYTES NFR BLD: 21.8 %
MCH RBC QN AUTO: 30.9 PG
MCHC RBC AUTO-ENTMCNC: 35.6 %
MCV RBC AUTO: 87 FL
MONOCYTES # BLD AUTO: 0.8 K/UL
MONOCYTES NFR BLD: 14.7 %
NEUTROPHILS # BLD AUTO: 3.4 K/UL
NEUTROPHILS NFR BLD: 61.5 %
PLATELET # BLD AUTO: 152 K/UL
PMV BLD AUTO: 10.5 FL
POCT GLUCOSE: 110 MG/DL (ref 70–110)
POCT GLUCOSE: 113 MG/DL (ref 70–110)
POCT GLUCOSE: 93 MG/DL (ref 70–110)
POCT GLUCOSE: 98 MG/DL (ref 70–110)
POTASSIUM SERPL-SCNC: 5.2 MMOL/L
PROT SERPL-MCNC: 9.5 G/DL
RBC # BLD AUTO: 4.73 M/UL
SODIUM SERPL-SCNC: 135 MMOL/L
TROPONIN I SERPL DL<=0.01 NG/ML-MCNC: 0.15 NG/ML
TSH SERPL DL<=0.005 MIU/L-ACNC: 0.57 UIU/ML
WBC # BLD AUTO: 5.59 K/UL

## 2017-02-22 PROCEDURE — 99223 1ST HOSP IP/OBS HIGH 75: CPT | Mod: ,,, | Performed by: NEUROLOGICAL SURGERY

## 2017-02-22 PROCEDURE — 96375 TX/PRO/DX INJ NEW DRUG ADDON: CPT

## 2017-02-22 PROCEDURE — 85025 COMPLETE CBC W/AUTO DIFF WBC: CPT

## 2017-02-22 PROCEDURE — 83880 ASSAY OF NATRIURETIC PEPTIDE: CPT

## 2017-02-22 PROCEDURE — 84484 ASSAY OF TROPONIN QUANT: CPT

## 2017-02-22 PROCEDURE — 20000000 HC ICU ROOM

## 2017-02-22 PROCEDURE — 99285 EMERGENCY DEPT VISIT HI MDM: CPT | Mod: ,,, | Performed by: EMERGENCY MEDICINE

## 2017-02-22 PROCEDURE — 82962 GLUCOSE BLOOD TEST: CPT

## 2017-02-22 PROCEDURE — 25000003 PHARM REV CODE 250: Performed by: EMERGENCY MEDICINE

## 2017-02-22 PROCEDURE — 80100014 HC HEMODIALYSIS 1:1

## 2017-02-22 PROCEDURE — 83036 HEMOGLOBIN GLYCOSYLATED A1C: CPT

## 2017-02-22 PROCEDURE — 63600175 PHARM REV CODE 636 W HCPCS: Performed by: NURSE PRACTITIONER

## 2017-02-22 PROCEDURE — 96374 THER/PROPH/DIAG INJ IV PUSH: CPT

## 2017-02-22 PROCEDURE — 82553 CREATINE MB FRACTION: CPT

## 2017-02-22 PROCEDURE — 25000003 PHARM REV CODE 250: Performed by: PHYSICIAN ASSISTANT

## 2017-02-22 PROCEDURE — 63600175 PHARM REV CODE 636 W HCPCS: Performed by: PHYSICIAN ASSISTANT

## 2017-02-22 PROCEDURE — 99223 1ST HOSP IP/OBS HIGH 75: CPT | Mod: AI,GC,, | Performed by: PSYCHIATRY & NEUROLOGY

## 2017-02-22 PROCEDURE — 99285 EMERGENCY DEPT VISIT HI MDM: CPT | Mod: 25

## 2017-02-22 PROCEDURE — 83690 ASSAY OF LIPASE: CPT

## 2017-02-22 PROCEDURE — 84443 ASSAY THYROID STIM HORMONE: CPT

## 2017-02-22 PROCEDURE — 93010 ELECTROCARDIOGRAM REPORT: CPT | Mod: ,,, | Performed by: INTERNAL MEDICINE

## 2017-02-22 PROCEDURE — 80053 COMPREHEN METABOLIC PANEL: CPT

## 2017-02-22 RX ORDER — LABETALOL HYDROCHLORIDE 5 MG/ML
20 INJECTION, SOLUTION INTRAVENOUS
Status: COMPLETED | OUTPATIENT
Start: 2017-02-22 | End: 2017-02-22

## 2017-02-22 RX ORDER — GLUCAGON 1 MG
1 KIT INJECTION
Status: DISCONTINUED | OUTPATIENT
Start: 2017-02-22 | End: 2017-02-25 | Stop reason: HOSPADM

## 2017-02-22 RX ORDER — LISINOPRIL 20 MG/1
40 TABLET ORAL
Status: COMPLETED | OUTPATIENT
Start: 2017-02-22 | End: 2017-02-22

## 2017-02-22 RX ORDER — INSULIN ASPART 100 [IU]/ML
1-10 INJECTION, SOLUTION INTRAVENOUS; SUBCUTANEOUS
Status: DISCONTINUED | OUTPATIENT
Start: 2017-02-22 | End: 2017-02-25 | Stop reason: HOSPADM

## 2017-02-22 RX ORDER — ONDANSETRON 2 MG/ML
4 INJECTION INTRAMUSCULAR; INTRAVENOUS EVERY 12 HOURS PRN
Status: DISCONTINUED | OUTPATIENT
Start: 2017-02-22 | End: 2017-02-23

## 2017-02-22 RX ORDER — CARVEDILOL 12.5 MG/1
12.5 TABLET ORAL
Status: COMPLETED | OUTPATIENT
Start: 2017-02-22 | End: 2017-02-22

## 2017-02-22 RX ORDER — AMLODIPINE BESYLATE 10 MG/1
10 TABLET ORAL
Status: COMPLETED | OUTPATIENT
Start: 2017-02-22 | End: 2017-02-22

## 2017-02-22 RX ORDER — IBUPROFEN 200 MG
24 TABLET ORAL
Status: DISCONTINUED | OUTPATIENT
Start: 2017-02-22 | End: 2017-02-25 | Stop reason: HOSPADM

## 2017-02-22 RX ORDER — SODIUM CHLORIDE 0.9 % (FLUSH) 0.9 %
3 SYRINGE (ML) INJECTION EVERY 8 HOURS
Status: DISCONTINUED | OUTPATIENT
Start: 2017-02-22 | End: 2017-02-25 | Stop reason: HOSPADM

## 2017-02-22 RX ORDER — ONDANSETRON 2 MG/ML
4 INJECTION INTRAMUSCULAR; INTRAVENOUS
Status: COMPLETED | OUTPATIENT
Start: 2017-02-22 | End: 2017-02-22

## 2017-02-22 RX ORDER — ACETAMINOPHEN 325 MG/1
650 TABLET ORAL EVERY 6 HOURS PRN
Status: DISCONTINUED | OUTPATIENT
Start: 2017-02-22 | End: 2017-02-25 | Stop reason: HOSPADM

## 2017-02-22 RX ORDER — IBUPROFEN 200 MG
16 TABLET ORAL
Status: DISCONTINUED | OUTPATIENT
Start: 2017-02-22 | End: 2017-02-25 | Stop reason: HOSPADM

## 2017-02-22 RX ADMIN — AMLODIPINE BESYLATE 10 MG: 10 TABLET ORAL at 12:02

## 2017-02-22 RX ADMIN — LABETALOL HYDROCHLORIDE 20 MG: 5 INJECTION, SOLUTION INTRAVENOUS at 10:02

## 2017-02-22 RX ADMIN — LISINOPRIL 40 MG: 20 TABLET ORAL at 12:02

## 2017-02-22 RX ADMIN — CARVEDILOL 12.5 MG: 12.5 TABLET, FILM COATED ORAL at 12:02

## 2017-02-22 RX ADMIN — ONDANSETRON 4 MG: 2 INJECTION INTRAMUSCULAR; INTRAVENOUS at 10:02

## 2017-02-22 RX ADMIN — INSULIN ASPART 2 UNITS: 100 INJECTION, SOLUTION INTRAVENOUS; SUBCUTANEOUS at 10:02

## 2017-02-22 NOTE — ED NOTES
"The patient came to the ER today with c/o of a "bad cold" and "his stool". Pt states he is "dripping out" stool that began yesterday. Vomiting and belching that began yesterday. Dialysis missed dialysis today (was scheduled for 5am) but was in the ER during his scheduled time. MWF. Pt received full run of dialysis on Monday. Dialysis access on right arm. Reports dry cough. Denies weakness or dizziness. Pt has hx of ICH in September and again in November with same symptoms: nausea and vomiting  "

## 2017-02-22 NOTE — NURSING
Triica NP notified of dbp 90's, no orders rec'd, stated that is acceptable at this time, pt will be dialyzed today

## 2017-02-22 NOTE — NURSING
Patient arrived to [facility] from ED     Type of Stroke: L Caudate ICH       Patients current symptoms include nausea, vomiting and diarrhea; no deficits at this time

## 2017-02-22 NOTE — CONSULTS
[facility]  History & Physical  Neurosurgery    SUBJECTIVE:     Chief Complaint: H    History of Present Illness:  Patient is a 51 y.o. male with previous L basal ganglia ICH 9/16, anemia, DMII, Hepatitis C, HTN, acute on chronic diastolic CHF, L BKA who presents to [facility] ED for evaluation of HA, nausea, and vomiting x 2 days. BP at presentation 208/117. Found to have small L caudate bleed on CT with several additional small hemorrhagic foci. Currently, pt denies weakness, HA, seizure, or blurred vision. He denies anticoagulant use.    Review of patient's allergies indicates:  No Known Allergies    Past Medical History   Diagnosis Date    Acute on chronic diastolic CHF (congestive heart failure) 9/14/2016    Anemia     Chronic low back pain 12/1/2015    Diabetes mellitus, type II     Diabetic neuropathy     ESRD on hemodialysis     Hepatitis C      states hepatitis B    Hypertension     Left basal ganglia ICH 5/6/2013    Metabolic acidosis, IAG, reduced excretion of inorganic acids     Myoclonic jerking 9/20/2016    Obesity     Secondary hyperparathyroidism (of renal origin)     TB lung, latent 8/25/2015    Thyroid disease      Past Surgical History   Procedure Laterality Date    R avf  9/12/12    Leg amputation through knee  12/18/2013     left BKA    Foot amputation through metatarsal       left foot    Colonoscopy       Family History   Problem Relation Age of Onset    Early death Mother     Kidney disease Father     Hypertension Father     Heart disease Father     Hyperlipidemia Father     Diabetes Brother     Alcohol abuse Maternal Grandmother     Diabetes Maternal Grandfather     Melanoma Neg Hx      Social History   Substance Use Topics    Smoking status: Former Smoker     Packs/day: 1.00     Years: 10.00    Smokeless tobacco: Never Used    Alcohol use No        Review of Systems:  Constitutional: no fever, chills or night sweats. No changes in weight   Eyes:  no visual changes   ENT: no nasal congestion or sore throat   Respiratory: no cough or shortness of breath   Cardiovascular: no chest pain or palpitations   Gastrointestinal: no nausea or vomiting   Genitourinary: no hematuria or dysuria   Integument/Breast: no rash or pruritis   Hematologic/Lymphatic: no easy bruising or lymphadenopathy   Musculoskeletal: no arthralgias or myalgias.   Neurological: no seizures or tremors   Behavioral/Psych: no auditory or visual hallucinations   Endocrine: no heat or cold intolerance     OBJECTIVE:     Vital Signs (Most Recent):  Temp: 97.8 °F (36.6 °C) (02/22/17 0755)  Pulse: 77 (02/22/17 1202)  Resp: 13 (02/22/17 1202)  BP: (!) 145/95 (02/22/17 1202)  SpO2: 99 % (02/22/17 1202)    Physical Exam:  General: well developed, well nourished, no distress.   Head: normocephalic, atraumatic  Neurologic: Alert and oriented. Thought content appropriate.  GCS: Motor: 6/Verbal: 5/Eyes: 4 GCS Total: 15  Mental Status: Awake, Alert, Oriented x 4  Language: No aphasia  Speech: No dysarthria  Cranial nerves: face symmetric, tongue midline, CN II-XII grossly intact.   Eyes: pupils equal, round, reactive to light with accomodation, EOMI.  Pulmonary: normal respirations, no signs of respiratory distress  Abdomen: soft, non-distended, not tender to palpation  Sensory: intact to light touch throughout    Motor Strength: Moves all extremities spontaneously with good tone.  Full strength upper and lower extremities; s/p L BKA. No abnormal movements seen.     DTR's: 2 + and symmetric in UE and LE  Pronator Drift: no drift noted  Finger-to-nose: Intact bilaterally  Cole: absent  Clonus: absent  Babinski: absent  Pulses: 2+ and symmetric radial and dorsalis pedis.  Skin: Skin is warm, dry and intact.    Laboratory:  CBC:   Recent Labs  Lab 02/22/17  1017   WBC 5.59   RBC 4.73   HGB 14.6   HCT 41.0      MCV 87   MCH 30.9   MCHC 35.6     BMP:   Recent Labs  Lab 02/22/17  1017   *   *    K 5.2*   CL 93*   CO2 22*   BUN 68*   CREATININE 7.2*   CALCIUM 10.2     Coagulation: No results for input(s): INR, APTT in the last 168 hours.    Invalid input(s): PT     Diagnostic Results:  CT head reviewed and shows small focus of hyperdensity in the left caudate, as well as several small hyperdense foci.    ASSESSMENT/PLAN:     51 y.o. with likely hypertensive IPH.    - Neuro stable.  - Recommend vascular imaging.  - SBP<160.  - Medical management otherwise per NCC.      Debra Patel PA-C  Neurosurgery  Pager: [phone]

## 2017-02-22 NOTE — IP AVS SNAPSHOT
[facility]  [address]  [facility] [address]  Phone: [phone]           Patient Discharge Instructions     Our goal is to set you up for success. This packet includes information on your condition, medications, and your home care. It will help you to care for yourself so you don't get sicker and need to go back to the hospital.     Please ask your nurse if you have any questions.        There are many details to remember when preparing to leave the hospital. Here is what you will need to do:    1. Take your medicine. If you are prescribed medications, review your Medication List in the following pages. You may have new medications to  at the pharmacy and others that you'll need to stop taking. Review the instructions for how and when to take your medications. Talk with your doctor or nurses if you are unsure of what to do.     2. Go to your follow-up appointments. Specific follow-up information is listed in the following pages. Your may be contacted by a transition nurse or clinical provider about future appointments. Be sure we have all of the phone numbers to reach you, if needed. Please contact your provider's office if you are unable to make an appointment.     3. Watch for warning signs. Your doctor or nurse will give you detailed warning signs to watch for and when to call for assistance. These instructions may also include educational information about your condition. If you experience any of warning signs to your health, call your doctor.               [facility]  Unless otherwise directed by your provider, please contact [facility], our nurse care line that is available for 24/7 assistance.     [phone] (toll-free)    Registered nurses in the [facility] provide clinical advisement, health education, appointment booking, and other advisory services.                    ** Verify the list of medication(s) below is accurate and up  to date. Carry this with you in case of emergency. If your medications have changed, please notify your healthcare provider.             Medication List      CHANGE how you take these medications        Additional Info                      gabapentin 600 MG tablet   Commonly known as:  NEURONTIN   Quantity:  180 tablet   Refills:  1   Dose:  600 mg   What changed:  Another medication with the same name was removed. Continue taking this medication, and follow the directions you see here.    Instructions:  Take 1 tablet (600 mg total) by mouth 3 (three) times daily.     Begin Date    AM    Noon    PM    Bedtime         CONTINUE taking these medications        Additional Info                      amlodipine 10 MG tablet   Commonly known as:  NORVASC   Quantity:  90 tablet   Refills:  3   Dose:  10 mg    Last time this was given:  10 mg on 2/24/2017  6:44 AM   Instructions:  Take 1 tablet (10 mg total) by mouth every morning.     Begin Date    AM    Noon    PM    Bedtime       carvedilol 12.5 MG tablet   Commonly known as:  COREG   Quantity:  60 tablet   Refills:  11   Dose:  12.5 mg    Last time this was given:  12.5 mg on 2/25/2017  4:21 PM   Instructions:  Take 1 tablet (12.5 mg total) by mouth 2 (two) times daily.     Begin Date    AM    Noon    PM    Bedtime       cinacalcet 60 MG Tab   Commonly known as:  SENSIPAR   Quantity:  30 tablet   Refills:  3   Dose:  60 mg    Last time this was given:  60 mg on 2/25/2017  4:18 PM   Instructions:  Take 1 tablet (60 mg total) by mouth daily with breakfast.     Begin Date    AM    Noon    PM    Bedtime       docusate sodium 100 MG capsule   Commonly known as:  COLACE   Refills:  0   Dose:  100 mg    Instructions:  Take 1 capsule (100 mg total) by mouth 2 (two) times daily.     Begin Date    AM    Noon    PM    Bedtime       duloxetine 30 MG capsule   Commonly known as:  CYMBALTA   Refills:  0   Dose:  60 mg    Instructions:  Take 2 capsules (60 mg total) by mouth once daily.      Begin Date    AM    Noon    PM    Bedtime       hydrocodone-acetaminophen 5-325mg 5-325 mg per tablet   Commonly known as:  NORCO   Quantity:  60 tablet   Refills:  0   Dose:  1 tablet    Instructions:  Take 1 tablet by mouth 2 (two) times daily as needed.     Begin Date    AM    Noon    PM    Bedtime       ketoconazole 2 % shampoo   Commonly known as:  NIZORAL   Quantity:  120 mL   Refills:  5    Instructions:  Wash hair and affected areas of skin with medicated shampoo at least 2x/week - let sit on skin at least 5 minutes prior to rinsing     Begin Date    AM    Noon    PM    Bedtime       lanthanum 1000 MG chewable tablet   Commonly known as:  FOSRENOL   Quantity:  60 tablet   Refills:  11   Dose:  1000 mg   Indications:  Renal Osteodystrophy with Hyperphosphatemia   Comments:  Please dispense the powder form equivalent to the above sig. The electronic medical record has not updated the pharmacy to include the powder form. Thank you    Last time this was given:  1,000 mg on 2/25/2017  4:19 PM   Instructions:  Take 1 tablet (1,000 mg total) by mouth 3 (three) times daily with meals.     Begin Date    AM    Noon    PM    Bedtime       lisinopril 20 MG tablet   Commonly known as:  PRINIVIL,ZESTRIL   Quantity:  90 tablet   Refills:  3   Dose:  40 mg    Last time this was given:  40 mg on 2/24/2017  9:48 AM   Instructions:  Take 2 tablets (40 mg total) by mouth once daily.     Begin Date    AM    Noon    PM    Bedtime       pantoprazole 40 MG tablet   Commonly known as:  PROTONIX   Quantity:  30 tablet   Refills:  3   Dose:  40 mg    Last time this was given:  40 mg on 2/25/2017  4:18 PM   Instructions:  Take 1 tablet (40 mg total) by mouth once daily.     Begin Date    AM    Noon    PM    Bedtime       sodium chloride 0.65 % nasal spray   Commonly known as:  OCEAN   Refills:  12   Dose:  1 spray    Instructions:  1 spray by Nasal route 2 (two) times daily.     Begin Date    AM    Noon    PM    Bedtime        "tramadol 50 mg tablet   Commonly known as:  ULTRAM   Quantity:  90 tablet   Refills:  2   Dose:  50 mg    Instructions:  Take 1 tablet (50 mg total) by mouth 3 (three) times daily as needed for Pain.     Begin Date    AM    Noon    PM    Bedtime                  Please bring to all follow up appointments:    1. A copy of your discharge instructions.  2. All medicines you are currently taking in their original bottles.  3. Identification and insurance card.    Please arrive 15 minutes ahead of scheduled appointment time.    Please call 24 hours in advance if you must reschedule your appointment and/or time.        Follow-up Information     Follow up with Ariana Vazquez MD In 1 week.    Specialty:  Family Medicine    Why:  As needed, If symptoms worsen    Contact information:    [address]  [place] [address]  [phone]        Referrals     Future Orders    Ambulatory Referral to Vascular Neurology         Discharge Instructions     Future Orders    Call MD for:  increased confusion or weakness     Call MD for:  persistent dizziness, light-headedness, or visual disturbances     Call MD for:  persistent nausea and vomiting or diarrhea     Call MD for:  redness, tenderness, or signs of infection (pain, swelling, redness, odor or green/yellow discharge around incision site)     Call MD for:  severe persistent headache     Call MD for:  temperature >100.4     Diet general     Questions:    Total calories:      Fat restriction, if any:      Protein restriction, if any:      Na restriction, if any:      Fluid restriction:      Additional restrictions:      WALKER FOR HOME USE     Questions:    Type of Walker:  Adult (5'4"-6'6")    With wheels?:  Yes    Height:  5' 6" (1.676 m)    Weight:  71.8 kg (158 lb 4.6 oz)    Length of need (1-99 months):  99    Platform attachment:      Accessories/Other:      Assistance needed:      Does patient have medical equipment at home?:  prosthesis    Please check all that apply:  " Patient is unable to safely ambulate without equipment.        Primary Diagnosis     Your primary diagnosis was:  Left-Sided Nontraumatic Intracerebral Hemorrhage      Admission Information     Date & Time Provider Department CSN    2/22/2017  9:23 AM Lul Zepeda MD [facility] [id number]      Care Providers     Provider Role Specialty Primary office phone    Lul Zepeda MD Attending Provider Neurology [phone]    Jeff Spencer MD Team Attending  Neuro Critical Care [phone]    John Joshua MD Team Attending  Neuro Critical Care [phone]    Alphonso Rea MD Team Attending  Interventional Neurology [phone]    Fredy Delvalle MD Team Attending  Neurology [phone]    Grayson Hubbard MD Consulting Physician  Nephrology [phone]      Your Vitals Were     BP                   129/81           Recent Lab Values        5/6/2009 4/1/2012                        8:00 AM  6:00 AM          A1C 6.3 (H) text          Comment for A1C at  6:00 AM on 4/1/2012:  Hemoglobin A1C: SENT TO [place] Possible contamination of HgbA1C by a Hemoglobin variant. This specimen was sent to [facility] for analysis by an alternate method.       Pending Labs     Order Current Status    Hepatitis B surface antigen In process      Allergies as of 2/25/2017     No Known Allergies      Advance Directives     An advance directive is a document which, in the event you are no longer able to make decisions for yourself, tells your healthcare team what kind of treatment you do or do not want to receive, or who you would like to make those decisions for you.  If you do not currently have an advance directive, [facility] encourages you to create one.  For more information call:  [phone]), [phone] ([phone]),  or log on to [email or web address].        Smoking Cessation     If you would like to quit smoking:   You may be eligible for free services  if you are a [place] resident and started smoking cigarettes before September 1, 1988.  Call the Smoking Cessation Trust (SCT) toll free at (460) 085-6186 or (450) 179-1317.   Call 1-800-QUIT-NOW if you do not meet the above criteria.            Language Assistance Services     ATTENTION: Language assistance services are available, free of charge. Please call [phone].      ATENCIÓN: Si habla fredy, tiene a engle disposición servicios gratuitos de asistencia lingüística. Llame al [phone].     CHÚ Ý: N?u b?n nói Ti?ng Vi?t, có các d?ch v? h? tr? ngôn ng? mi?n phí dành cho b?n. G?i s? [phone].        Stroke Education              Heart Failure Education       Heart Failure: Being Active  You have a condition called heart failure. Being active doesnt mean that you have to wear yourself out. Even a little movement each day helps to strengthen your heart. If you cant get out to exercise, you can do simple stretching and strengthening exercises at home. These are good ways to keep you well-conditioned and prevent you and your heart from becoming excessively weak.    Ideas to get you started  · Add a little movement to things you do now. Walk to mail letters. Park your car at the far end of the parking lot and walk to the store. Walk up a flight of stairs instead of taking the elevator.  · Choose activities you enjoy. You might walk, swim, or ride an exercise bike. Things like gardening and washing the car count, too. Other possibilities include: washing dishes, walking the dog, walking around the mall, and doing aerobic activities with friends.  · Join a group exercise program at a [facility], a senior center, or a community center. Or look into a hospital cardiac rehabilitation program. Ask your doctor if you qualify.  Tips to keep you going  · Get up and get dressed each day. Go to a coffee shop and read a newspaper or go somewhere that you'll be in the presence of other active people. Youbetina  feel more like being active.  · Make a plan. Choose one or more activities that you enjoy and that you can easily do. Then plan to do at least one each day. You might write your plan on a calendar.  · Go with a friend or a group if you like company. This can help you feel supported and stay motivated, too.  · Plan social events that you enjoy. This will keep you mentally engaged as well as physically motivated to do things you find pleasure in.  For your safety  · Talk with your healthcare provider before starting an exercise program.  · Exercise indoors when its too hot or too cold outside, or when the air quality is poor. Try walking at a shopping mall.  · Wear socks and sturdy shoes to maintain your balance and prevent falls.  · Start slowly. Do a few minutes several times a day at first. Increase your time and speed little by little.  · Stop and rest whenever you feel tired or get short of breath.  · Dont push yourself on days when you dont feel well.  Date Last Reviewed: 3/20/2016  © 9370-2603 [employer]. [address] [place], [place] [address]. All rights reserved. This information is not intended as a substitute for professional medical care. Always follow your healthcare professional's instructions.              Heart Failure: Evaluating Your Heart  You have a condition called heart failure. To evaluate your condition, your doctor will examine you, ask questions, and do some tests. Along with looking for signs of heart failure, the doctor looks for any other health problems that may have led to heart failure. The results of your evaluation will help your doctor form a treatment plan.  Health history and physical exam  Your visit will start with a health history. Tell the doctor about any symptoms youve noticed and about all medicines you take. Then youll have a physical exam. This includes listening to your heartbeat and breathing. Youll also be checked for swelling (edema) in your  legs and neck. When you have fluid buildup or fluid in the lungs, it may be called congestive heart failure.  Diagnosing heart failure     During an echocardiogram, sound waves bounce off the heart. These are converted into a picture on the screen.   The following may be done to help your doctor form a diagnosis:  · X-rays show the size and shape of your heart. These pictures can also show fluid in your lungs.  · An electrocardiogram (ECG or EKG) shows the pattern of your heartbeat. Small pads (electrodes) are placed on your chest, arms, and legs. Wires connect the pads to the ECG machine, which records your hearts electrical signals. This can give the doctor information about heart function.  · An echocardiogram uses ultrasound waves to show the structure and movement of your heart muscle. This shows how well the heart pumps. It also shows the thickness of the heart walls, and if the heart is enlarged. It is one of the most useful, non-invasive tests as it provides information about the heart's general function. This helps your doctor make treatment decisions.  · Lab tests evaluate small amounts of blood or urine for signs of problems. A BNP lab test can help diagnose and evaluate heart failure. BNP stands for B-type natriuretic peptide. The ventricles secrete more BNP when heart failure worsens. Lab tests can also provide information about metabolic dysfunction or heart dysfunction.  Your treatment plan  Based on the results of your evaluation and tests, your doctor will develop a treatment plan. This plan is designed to relieve some of your heart failure symptoms and help make you more comfortable. Your treatment plan may include:  · Medicine to help your heart work better and improve your quality of life  · Changes in what you eat and drink to help prevent fluid from backing up in your body  · Daily monitoring of your weight and heart failure symptoms to see how well your treatment plan is working  · Exercise  to help you stay healthy  · Help with quitting smoking  · Emotional and psychological support to help adjust to the changes  · Referrals to other specialists to make sure you are being treated comprehensively  Date Last Reviewed: 3/21/2016  © 9587-5266 [employer]. [address] [place] [address], [place] [address]. All rights reserved. This information is not intended as a substitute for professional medical care. Always follow your healthcare professional's instructions.              Heart Failure: Making Changes to Your Diet  You have a condition called heart failure. When you have heart failure, excess fluid is more likely to build up in your body because your heart isn't working well. This makes the heart work harder to pump blood. Fluid buildup causes symptoms such as shortness of breath and swelling (edema). This is often referred to as congestive heart failure or CHF. Controlling the amount of salt (sodium) you eat may help stop fluid from building up. Your doctor may also tell you to reduce the amount of fluid you drink.  Reading food labels    Your healthcare provider will tell you how much sodium you can eat each day. Read food labels to keep track. Keep in mind that certain foods are high in salt. These include canned, frozen, and processed foods. Check the amount of sodium in each serving. Watch out for high-sodium ingredients. These include MSG (monosodium glutamate), baking soda, and sodium phosphate.   Eating less salt  Give yourself time to get used to eating less salt. It may take a little while. Here are some tips to help:  · Take the saltshaker off the table. Replace it with salt-free herb mixes and spices.  · Eat fresh or plain frozen vegetables. These have much less salt than canned vegetables.  · Choose low-sodium snacks like sodium-free pretzels, crackers, or air-popped popcorn.  · Dont add salt to your food when youre cooking. Instead, season your foods with pepper, lemon, garlic, or  onion.  · When you eat out, ask that your food be cooked without added salt.  · Avoid eating fried foods as these often have a great deal of salt.  If youre told to limit fluids  You may need to limit how much fluid you have to help prevent swelling. This includes anything that is liquid at room temperature, such as ice cream and soup. If your doctor tells you to limit fluid, try these tips:  · Measure drinks in a measuring cup before you drink them. This will help you meet daily goals.  · Chill drinks to make them more refreshing.  · Suck on frozen lemon wedges to quench thirst.  · Only drink when youre thirsty.  · Chew sugarless gum or suck on hard candy to keep your mouth moist.  · Weigh yourself daily to know if your body's fluid content is rising.  My sodium goal  Your healthcare provider may give you a sodium goal to meet each day. This includes sodium found in food as well as salt that you add. My goal is to eat no more than ___________ mg of sodium per day.     When to call your doctor  Call your doctor right away if you have any symptoms of worsening heart failure. These can include:  · Sudden weight gain  · Increased swelling of your legs or ankles  · Trouble breathing when youre resting or at night  · Increase in the number of pillows you have to sleep on  · Chest pain, pressure, discomfort, or pain in the jaw, neck, or back   Date Last Reviewed: 3/21/2016  © 5400-3642 [employer]. [address] [place] [address], [place] [address]. All rights reserved. This information is not intended as a substitute for professional medical care. Always follow your healthcare professional's instructions.              Heart Failure: Medicines to Help Your Heart    You have a condition called heart failure (also known as congestive heart failure, or CHF). Your doctor will likely prescribe medicines for heart failure and any underlying health problems you have. Most heart failure patients take one or more types of  medicinen. Your healthcare provider will work to find the combination of medicines that works best for you.  Heart failure medicines  Here are the most common heart failure medicines:  · ACE inhibitors lower blood pressure and decrease strain on the heart. This makes it easier for the heart to pump. Angiotensin receptor blockers have similar effects. These are prescribed for some patients instead of ACE inhibitors.  · Beta-blockers relieve stress on the heart. They also improve symptoms. They may also improve the heart's pumping action over time.  · Diuretics (also called water pills) help rid your body of excess water. This can help rid your body of swelling (edema). Having less fluid to pump means your heart doesnt have to work as hard. Some diuretics make your body lose a mineral called potassium. Your doctor will tell you if you need to take supplements or eat more foods high in potassium.  · Digoxin helps your heart pump with more strength. This helps your heart pump more blood with each beat. So, more oxygen-rich blood travels to the rest of the body.  · Aldosterone antagonists help alter hormones and decrease strain on the heart.  · Hydralazine and nitrates are two separate medicines used together to treat heart failure. They may come in one combination pill. They lower blood pressure and decrease how hard the heart has to pump.  Medicines for related conditions  Controlling other heart problems helps keep heart failure under control, too. Depending on other heart problems you have, medicines may be prescribed to:  · Lower blood pressure (antihypertensives).  · Lower cholesterol levels (statins).  · Prevent blood clots (anticoagulants or aspirin).  · Keep the heartbeat steady (antiarrhythmics).  Date Last Reviewed: 3/5/2016  © 3883-1512 [employer]. [address] [place] [address], [place] [address]. All rights reserved. This information is not intended as a substitute for professional medical care.  Always follow your healthcare professional's instructions.              Heart Failure: Procedures That May Help    The heart is a muscle that pumps oxygen-rich blood to all parts of the body. When you have heart failure, the heart is not able to pump as well as it should. Blood and fluid may back up into the lungs (congestive heart failure), and some parts of the body dont get enough oxygen-rich blood to work normally. These problems lead to the symptoms of heart failure.     Certain procedures may help the heart pump better in some cases of heart failure. Some procedures are done to treat health problems that may have caused the heart failure such as coronary artery disease or heart rhythm problems. For more serious heart failure, other options are available.  Treating artery and valve problems  If you have coronary artery disease or valve disease, procedures may be done to improve blood flow. This helps the heart pump better, which can improve heart failure symptoms. First, your doctor may do a cardiac catheterization to help detect clogged blood vessels or valve damage. During this procedure, a  thin tube (catheter) in inserted into a blood vessel and guided to the heart. There a dye is injected and a special type of X-ray (angiogram) is taken of the blood vessels. Procedures to open a blocked artery or fix damaged valves can also be done using catheterization.  · Angioplasty uses a balloon-tipped instrument at the end of the catheter. The balloon is inflated to widen the narrowed artery. In many cases, a stent is expanded to further support the narrowed artery. A stent is a metal mesh tube.  · Valve surgery repairs or replacement of faulty valves can also be done during catheterization so blood can flow properly through the chambers of the heart.  Bypass surgery is another option to help treat blocked arteries. It uses a healthy blood vessel from elsewhere in the body. The healthy blood vessel is attached above  and below the blocked area so that blood can flow around the blocked artery.  Treating heart rhythm problems  A device may be placed in the chest to help a weak heart maintain a healthy, heartbeat so the heart can pump more effectively:  · Pacemaker. A pacemaker is an implanted device that regulates your heartbeat electronically. It monitors your heart's rhythm and generates a painless electric impulse that helps the heart beat in a regular rhythm. A pacemaker is programmed to meet your specific heart rhythm needs.  · Biventricular pacing/cardiac resynchronization therapy. A type of pacemaker that paces both pumping chambers of the heart at the same time to coordinate contractions and to improve the heart's function. Some people with heart failure are candidates for this therapy.  · Implantable cardioverter defibrillator. A device similar to a pacemaker that senses when the heart is beating too fast and delivers an electrical shock to convert the fast rhythm to a normal rhythm. This can be a life saving device.  In severe cases  In more serious cases of heart failure when other treatments no longer work, other options may include:  · Ventricular assist devices (VADs). These are mechanical devices used to take over the pumping function for one or both of the heart's ventricles, or pumping chambers. A VAD may be necessary when heart failure progresses to the point that medicines and other treatments no longer help. In some cases, a VAD may be used as a bridge to transplant.  · Heart transplant. This is replacing the diseased heart with a healthy one from a donor. This is an option for a few people who are very sick. A heart transplant is very serious and not an option for all patients. Your doctor can tell you more.  Date Last Reviewed: 3/20/2016  © 5663-3074 [employer]. [address] [place] [address], [place] [address]. All rights reserved. This information is not intended as a substitute for professional  medical care. Always follow your healthcare professional's instructions.              Heart Failure: Tracking Your Weight  You have a condition called heart failure. When you have heart failure, a sudden weight gain or a steady rise in weight is a warning sign that your body is retaining too much water and salt. This could mean your heart failure is getting worse. If left untreated, it can cause problems for your lungs and result in shortness of breath. Weighing yourself each day is the best way to know if youre retaining water. If your weight goes up quickly, call your doctor. You will be given instructions on how to get rid of the excess water. You will likely need medicines and to avoid salt. This will help your heart work better.  Call your doctor if you gain more than 2 pounds in 1 day, more than 5 pounds in 1 week, or whatever weight gain you were told to report by your doctor. This is often a sign of worsening heart failure and needs to be evaluated and treated. Your doctor will tell you what to do next.   Tips for weighing yourself    · Weigh yourself at the same time each morning, wearing the same clothes. Weigh yourself after urinating and before eating.  · Use the same scale each day. Make sure the numbers are easy to read. Put the scale on a flat, hard surface -- not on a rug or carpet.  · Do not stop weighing yourself. If you forget one day, weigh again the next morning.  How to use your weight chart  · Keep your weight chart near the scale. Write your weight on the chart as soon as you get off the scale.  · Fill in the month and the start date on the chart. Then write down your weight each day. Your chart will look like this:    · If you miss a day, leave the space blank. Weigh yourself the next day and write your weight in the next space.  · Take your weight chart with you when you go to see your doctor.  Date Last Reviewed: 3/20/2016  © 3430-8671 [employer]. [address] [place] [address],  [place] [address]. All rights reserved. This information is not intended as a substitute for professional medical care. Always follow your healthcare professional's instructions.              Heart Failure: Warning Signs of a Flare-Up  You have a condition called heart failure. Once you have heart failure, flare-ups can happen. Below are signs that can mean your heart failure is getting worse. If you notice any of these warning signs, call your healthcare provider.  Swelling    · Your feet, ankles, or lower legs get puffier.  · You notice skin changes on your lower legs.  · Your shoes feel too tight.  · Your clothes are tighter in the waist.  · You have trouble getting rings on or off your fingers.  Shortness of breath  · You have to breathe harder even when youre doing your normal activities or when youre resting.  · You are short of breath walking up stairs or even short distances.  · You wake up at night short of breath or coughing.  · You need to use more pillows or sit up to sleep.  · You wake up tired or restless.  Other warning signs  · You feel weaker, dizzy, or more tired.  · You have chest pain or changes in your heartbeat.  · You have a cough that wont go away.  · You cant remember things or dont feel like eating.  Tracking your weight  Gaining weight is often the first warning sign that heart failure is getting worse. Gaining even a few pounds can be a sign that your body is retaining excess water and salt. Weighing yourself each day in the morning after you urinate and before you eat, is the best way to know if you're retaining water. Get a scale that is easy to read and make sure you wear the same clothes and use the same scale every time you weigh. Your healthcare provider will show you how to track your weight. Call your doctor if you gain more than 2 pounds in 1 day, 5 pounds in 1 week, or whatever weight gain you were told to report by your doctor. This is often a sign of worsening heart  failure and needs to be evaluated and treated before it compromises your breathing. Your doctor will tell you what to do next.    Date Last Reviewed: 3/15/2016  © 1252-7551 [employer]. [address] [place] [address], [place] [address]. All rights reserved. This information is not intended as a substitute for professional medical care. Always follow your healthcare professional's instructions.              Pneumonmia Discharge Instructions                Chronic Kindey Disease Education             Diabetes Discharge Instructions                                    [facility] complies with applicable Federal civil rights laws and does not discriminate on the basis of race, color, national origin, age, disability, or sex.

## 2017-02-22 NOTE — ED NOTES
The patient is awake, alert and cooperative with a calm affect, patient is aware of environment. Airway is open and patent, respirations are spontaneous, normal respiratory effort and rate noted, skin warm and dry, moves all extremities well, appearance: no apparent distress noted. Side rails x 2. Call light in reach. Attached to the cardiac monitor.

## 2017-02-22 NOTE — ED PROVIDER NOTES
Encounter Date: 2/22/2017    SCRIBE #1 NOTE: I, Lauren Ramos, am scribing for, and in the presence of,  Dr. Arcos. I have scribed the following portions of the note - Other sections scribed: Attending notes.       History     Chief Complaint   Patient presents with    Vomiting     pt reports n,v,d that began yesterday morning     Hepatitis C     Review of patient's allergies indicates:  No Known Allergies  HPI Comments: 51-year-old black male presents with 2 day history of nausea and vomiting.  Patient states she is unable to retain liquid or solid foods.  Patient states he vomits 2-3 times per day.  Patient denies abdominal pain.  Patient also states she has liquid stool leaking constantly from his rectum for the last 2 days as well.  However, I am not sure how accurate history I am obtaining from the patient.  Patient has had 2 subarachnoid hemorrhages in the past and there is no one with him at this time.  Patient denies headache, sore throat, change in vision, or difficulty in swallowing.  Patient denies chest pain, shortness of breath, abdominal pain, neurological changes, or peripheral edema.  Patient states he rarely makes urine.  Patient was due for dialysis today.  Patient receives dialysis on Monday, Wednesday and Friday.  Patient presents emergency room for evaluation treatment.    The history is provided by the patient.     Past Medical History   Diagnosis Date    Acute on chronic diastolic CHF (congestive heart failure) 9/14/2016    Anemia     Chronic low back pain 12/1/2015    Diabetes mellitus, type II     Diabetic neuropathy     ESRD on hemodialysis     Hepatitis C      states hepatitis B    Hypertension     Left basal ganglia ICH 5/6/2013    Metabolic acidosis, IAG, reduced excretion of inorganic acids     Myoclonic jerking 9/20/2016    Obesity     Secondary hyperparathyroidism (of renal origin)     TB lung, latent 8/25/2015    Thyroid disease      No past medical history  pertinent negatives.  Past Surgical History   Procedure Laterality Date    R avf  9/12/12    Leg amputation through knee  12/18/2013     left BKA    Foot amputation through metatarsal       left foot    Colonoscopy       Family History   Problem Relation Age of Onset    Early death Mother     Kidney disease Father     Hypertension Father     Heart disease Father     Hyperlipidemia Father     Diabetes Brother     Alcohol abuse Maternal Grandmother     Diabetes Maternal Grandfather     Melanoma Neg Hx      Social History   Substance Use Topics    Smoking status: Former Smoker     Packs/day: 1.00     Years: 10.00    Smokeless tobacco: Never Used    Alcohol use No     Review of Systems   Constitutional: Positive for activity change, appetite change and fatigue. Negative for fever.   HENT: Negative for congestion, ear discharge, ear pain, nosebleeds, postnasal drip, rhinorrhea, sore throat and trouble swallowing.    Eyes: Negative for photophobia, pain and redness.   Respiratory: Negative for cough, shortness of breath and wheezing.    Cardiovascular: Negative for chest pain, palpitations and leg swelling.   Gastrointestinal: Positive for diarrhea, nausea and vomiting. Negative for abdominal pain, anal bleeding and blood in stool.   Genitourinary: Negative for discharge.   Musculoskeletal: Negative for gait problem.   Skin: Negative for rash.   Neurological: Negative for seizures, facial asymmetry, speech difficulty and numbness.   Psychiatric/Behavioral: Negative for confusion and hallucinations.       Physical Exam   Initial Vitals   BP Pulse Resp Temp SpO2   02/22/17 0503 02/22/17 0503 02/22/17 0503 02/22/17 0755 02/22/17 0503   208/117 95 18 97.8 °F (36.6 °C) 98 %     Physical Exam    Nursing note and vitals reviewed.  Constitutional: He appears well-developed and well-nourished.   HENT:   Head: Normocephalic and atraumatic.   Right Ear: External ear normal.   Left Ear: External ear normal.   Nose:  Nose normal.   Mouth/Throat: Oropharynx is clear and moist.   Eyes: Conjunctivae and EOM are normal. Pupils are equal, round, and reactive to light.   Neck: Normal range of motion. Neck supple.   Cardiovascular: Normal rate, regular rhythm and normal heart sounds. Exam reveals no gallop and no friction rub.    No murmur heard.  Pulmonary/Chest: Breath sounds normal. No respiratory distress. He has no wheezes. He has no rhonchi. He has no rales. He exhibits no tenderness.   Abdominal: Soft. Bowel sounds are normal. He exhibits no distension and no mass. There is no tenderness. There is no rebound and no guarding.   Musculoskeletal: Normal range of motion. He exhibits no edema or tenderness.   There is a left lower leg prosthesis present.  Patient's had previous left below-knee amputation.   Neurological: He is alert and oriented to person, place, and time.   Psychiatric: He has a normal mood and affect. His behavior is normal. Judgment and thought content normal.         ED Course   Procedures  Labs Reviewed   CBC W/ AUTO DIFFERENTIAL   COMPREHENSIVE METABOLIC PANEL   LIPASE             Medical Decision Making:   History:   Old Medical Records: I decided to obtain old medical records.  Old Records Summarized: records from clinic visits.  Initial Assessment:   51-year-old black male presents with 2 day history of nausea and vomiting.  Patient states she is unable to retain liquid or solid foods.  Patient states he vomits 2-3 times per day.  Patient denies abdominal pain.  Patient also states she has liquid stool leaking constantly from his rectum for the last 2 days as well.  However, I am not sure how accurate history I am obtaining from the patient.  Patient has had 2 subarachnoid hemorrhages in the past and there is no one with him at this time.  Patient denies headache, sore throat, change in vision, or difficulty in swallowing.  Patient denies chest pain, shortness of breath, abdominal pain, neurological changes,  or peripheral edema.  Patient states he rarely makes urine.  Patient was due for dialysis today.  Patient receives dialysis on Monday, Wednesday and Friday.  Patient presents emergency room for evaluation treatment.  Differential Diagnosis:   Electrolyte imbalance, gastritis,  Independently Interpreted Test(s):   I have ordered and independently interpreted X-rays - see summary below.       <> Summary of X-Ray Reading(s): New small hemorrhage centered in the left caudate lobe head. No significant mass effect/edema.     Multiple remote bilateral basal ganglia, thalamic and brainstem hemorrhages in a distribution highly suggestive of chronic hypertensive encephalopathy.     Chronic microvascular ischemic changes with remote lacunar type infarct within the right thalamus.     Report flagged [facility] the [facility].  Clinical Tests:   Lab Tests: Ordered and Reviewed  The following lab test(s) were unremarkable: CBC and CMP       <> Summary of Lab: CBC is normal, GFR of 9.2, potassium 5.2, BUNs 68, creatinine 7.2  Radiological Study: Ordered and Reviewed  ED Management:  CT scan reveals acute intracranial hemorrhage.  Patient's treated with IV labetalol 20 mg, consultation with neurosurgery, discussed with neuro critical care.  Patient will be admitted            Scribe Attestation:   Scribe #1: I performed the above scribed service and the documentation accurately describes the services I performed. I attest to the accuracy of the note.    Attending Attestation:     Physician Attestation Statement for NP/PA:   I have conducted a face to face encounter with this patient in addition to the NP/PA, due to Medical Complexity        Physician Attestation for Scribe:  Physician Attestation Statement for Scribe #1: I, Dr. Arcos, reviewed documentation, as scribed by Lauren Ramos in my presence, and it is both accurate and complete.         Attending ED Notes:   12:26 PM  BP is 145/95.          ED Course     Clinical  Impression:   The encounter diagnosis was Nausea & vomiting.          John Glotfelty, PA  02/22/17 2896

## 2017-02-22 NOTE — CONSULTS
Consult Note  Nephrology    Consult Requested By: Jeane Arcos MD  Reason for Consult: ESRD    SUBJECTIVE:     History of Present Illness:  Patient is a 51 y.o. male with medical history consiting of ESRD on dialysis MWF, previous L basal ganglia ICH 9/16, anemia,DMII, Hepatitis C, HTN, acute on chronic diastolic CHF, L BKA He presents today with 2 day history of nausea and vomiting. Patient states he is unable to retain liquid or solid foods. Patient states he vomits 2-3 times per day. Patient denies abdominal pain. Patient also states she has liquid stool leaking constantly from his rectum for the last 2 days as well.  Patient was recently admitted in November of last year for Non-traumatic cortical hemorrhage with intrventricular extension.  Today he voices no complaints of headache, extremity weakness, or vision changes. His last HD session was on Monday and he reports that it went well without complications.  He usually dialyzes at [facility] and is unsure of his nephrologist.  He uses an AVF for his access.     Past Medical History   Diagnosis Date    Acute on chronic diastolic CHF (congestive heart failure) 9/14/2016    Anemia     Chronic low back pain 12/1/2015    Diabetes mellitus, type II     Diabetic neuropathy     ESRD on hemodialysis     Hepatitis C      states hepatitis B    Hypertension     Left basal ganglia ICH 5/6/2013    Metabolic acidosis, IAG, reduced excretion of inorganic acids     Myoclonic jerking 9/20/2016    Obesity     Secondary hyperparathyroidism (of renal origin)     TB lung, latent 8/25/2015    Thyroid disease      Past Surgical History   Procedure Laterality Date    R avf  9/12/12    Leg amputation through knee  12/18/2013     left BKA    Foot amputation through metatarsal       left foot    Colonoscopy       Family History   Problem Relation Age of Onset    Early death Mother     Kidney disease Father     Hypertension Father     Heart disease Father      Hyperlipidemia Father     Diabetes Brother     Alcohol abuse Maternal Grandmother     Diabetes Maternal Grandfather     Melanoma Neg Hx      Social History   Substance Use Topics    Smoking status: Former Smoker     Packs/day: 1.00     Years: 10.00    Smokeless tobacco: Never Used    Alcohol use No       Review of patient's allergies indicates:  No Known Allergies     Review of Systems:  Constitutional: Denies fevers, weight loss, chills, or weakness.  Eyes: Denies changes in vision.  ENT: Denies dysphagia, nasal discharge, ear pain or discharge.  Cardiovascular: Denies chest pain, palpitations, orthopnea, or claudication.  Respiratory: Denies shortness of breath, cough, hemoptysis, or wheezing.  GI: Positive for N/V.  Denies hematochezia, melena, abd pain, or changes in appetite.  : Denies dysuria, incontinence, or hematuria.  Musculoskeletal: Denies joint pain or myalgias.  Skin/breast: Denies rashes, lumps, lesions, or discharge.  Neurologic: Denies headache, dizziness, vertigo, or paresthesias.  Psychiatric: Denies changes in mood or hallucinations.  Endocrine: Denies polyuria, polydipsia, heat/cold intolerance.  Hematologic/Lymph: Denies lymphadenopathy, easy bruising or easy bleeding.      OBJECTIVE:     Vital Signs (Most Recent)  Temp: 97.9 °F (36.6 °C) (02/22/17 1532)  Pulse: 74 (02/22/17 1532)  Resp: 13 (02/22/17 1202)  BP: (!) 140/88 (02/22/17 1532)  SpO2: 99 % (02/22/17 1202)    Vital Signs Range (Last 24H):  Temp:  [97.8 °F (36.6 °C)-97.9 °F (36.6 °C)]   Pulse:  [74-95]   Resp:  [12-20]   BP: (133-208)/()   SpO2:  [98 %-100 %]     Physical Exam:  General: AAM in NAD.  AOx4.  Neck: Supple, symmetrical, trachea midline, no thyromegaly, no JVD  Respiratory: Clear to auscultation bilaterally, respirations unlabored, no rales/rhonchi/wheezing  Cardiovacular: Regular rate and rhythm, S1, S2 normal, no murmurs, rubs or gallops  Gastrointestinal: Soft, non-tender, bowel sounds normal, no  hepatosplenomegaly  Extremities: No clubbing or cyanosis of bilateral upper extremities; L BKA.  No lower extremity edema  Skin: warm and dry; no rash on exposed skin      Laboratory:  CBC:   Recent Labs  Lab 02/22/17  1017   WBC 5.59   RBC 4.73   HGB 14.6   HCT 41.0      MCV 87   MCH 30.9   MCHC 35.6     BMP:   Recent Labs  Lab 02/22/17  1017   *   CL 93*   CO2 22*   BUN 68*   CREATININE 7.2*   CALCIUM 10.2         ASSESSMENT/PLAN:   52 yo AAM  with medical history consiting of ESRD on dialysis MWF, previous L basal ganglia ICH 9/16, anemia,DMII, Hepatitis C, HTN, acute on chronic diastolic CHF, L BKA He presents today with 2 day history of nausea and vomiting.  Typically dialyzes MWF at [facility].  Access is AVF.  Active Hospital Problems    Diagnosis  POA    *Left-sided nontraumatic intracerebral hemorrhage [I61.9]  Unknown    Nausea & vomiting [R11.2]  Yes      Resolved Hospital Problems    Diagnosis Date Resolved POA   No resolved problems to display.         Plan:   ESRD (MWF)  -MWF at [facility], last HD on Monday, due today.  -mild hyperkalemia noted on Renal panel at 5.2  -Oxygenating well on RA, no signs of volume overload  -HDS, plans to transfer to NICU for closer monitoring  -will perform 3 hour HD treatment today once he arrives in [facility]  -UF 1-2L as tolerated  -will obtain outpatient dialysis records.          VANITA Valentine, FNP-BC  Nephrology  Pager:  [phone]      Patient seen and examined on HD;   I have reviewed and agree with assessment and plan

## 2017-02-22 NOTE — ED NOTES
"Pt states that he is a dialysis pt, began feeling nauseated yesterday. States "I don't feel nauseated anymore" Pt denies any pain, denies shortness of breath. Pt states that he receives dialysis Monday Wednesday and Friday. Pt states that he missed his dialysis today/ Wants to be dialyzed today/here.   "

## 2017-02-22 NOTE — H&P
History & Physical  Neurocritical Care    Admit Date: 2/22/2017  LOS: 0    Code Status: Full Code     CC: <principal problem not specified>    SUBJECTIVE:     History of Present Illness:   51-year-old black male with history of ESRD, dialysis MWF, previous L basal ganglia ICH 9/16, anemia,DMII, Hepatitis C, HTN, acute on chronic diastolic CHF, L BKA  He presents today with 2 day history of nausea and vomiting. Patient states he is unable to retain liquid or solid foods. Patient states he vomits 2-3 times per day. Patient denies abdominal pain. Patient also states she has liquid stool leaking constantly from his rectum for the last 2 days as well. ED physician  not sure how accurate the history was from the patient. Patient has had 2 subarachnoid hemorrhages in the past and there is no one with him at this time. Patient denies headache, sore throat, change in vision, or difficulty in swallowing. Patient denies chest pain, shortness of breath, abdominal pain, neurological changes, or peripheral edema. Patient states he rarely makes urine. Patient was due for dialysis today. Patient receives dialysis on Monday, Wednesday and Friday. Patient presents emergency room for evaluation treatment.   The history was provided by the patient.   CT head revealed small L caudate bleed. Patient witll be admited to NICU to monitor closely for neurological changes or worsening of  Bleed  Nephrology consulted for hemodyalisis.      Past Medical History   Diagnosis Date    Acute on chronic diastolic CHF (congestive heart failure) 9/14/2016    Anemia     Chronic low back pain 12/1/2015    Diabetes mellitus, type II     Diabetic neuropathy     ESRD on hemodialysis     Hepatitis C      states hepatitis B    Hypertension     Left basal ganglia ICH 5/6/2013    Metabolic acidosis, IAG, reduced excretion of inorganic acids     Myoclonic jerking 9/20/2016    Obesity     Secondary hyperparathyroidism (of renal origin)     TB lung,  latent 8/25/2015    Thyroid disease      Past Surgical History   Procedure Laterality Date    R avf  9/12/12    Leg amputation through knee  12/18/2013     left BKA    Foot amputation through metatarsal       left foot    Colonoscopy       No current facility-administered medications on file prior to encounter.      Current Outpatient Prescriptions on File Prior to Encounter   Medication Sig Dispense Refill    amlodipine (NORVASC) 10 MG tablet Take 1 tablet (10 mg total) by mouth every morning. 90 tablet 3    carvedilol (COREG) 12.5 MG tablet Take 1 tablet (12.5 mg total) by mouth 2 (two) times daily. 60 tablet 11    cinacalcet (SENSIPAR) 60 MG Tab Take 1 tablet (60 mg total) by mouth daily with breakfast. 30 tablet 3    docusate sodium (COLACE) 100 MG capsule Take 1 capsule (100 mg total) by mouth 2 (two) times daily.  0    duloxetine (CYMBALTA) 30 MG capsule Take 2 capsules (60 mg total) by mouth once daily.      gabapentin (NEURONTIN) 100 MG capsule Take 1 capsule (100 mg total) by mouth 3 (three) times daily. 90 capsule 3    gabapentin (NEURONTIN) 600 MG tablet Take 1 tablet (600 mg total) by mouth 3 (three) times daily. 180 tablet 1    hydrocodone-acetaminophen 5-325mg (NORCO) 5-325 mg per tablet Take 1 tablet by mouth 2 (two) times daily as needed. 60 tablet 0    ketoconazole (NIZORAL) 2 % shampoo Wash hair and affected areas of skin with medicated shampoo at least 2x/week - let sit on skin at least 5 minutes prior to rinsing 120 mL 5    lanthanum (FOSRENOL) 1000 MG chewable tablet Take 1 tablet (1,000 mg total) by mouth 3 (three) times daily with meals. 60 tablet 11    lisinopril (PRINIVIL,ZESTRIL) 20 MG tablet Take 2 tablets (40 mg total) by mouth once daily. 90 tablet 3    pantoprazole (PROTONIX) 40 MG tablet Take 1 tablet (40 mg total) by mouth once daily. 30 tablet 3    sodium chloride (OCEAN) 0.65 % nasal spray 1 spray by Nasal route 2 (two) times daily.  12    tramadol (ULTRAM) 50 mg  tablet Take 1 tablet (50 mg total) by mouth 3 (three) times daily as needed for Pain. 90 tablet 2     Review of patient's allergies indicates:  No Known Allergies  Family History   Problem Relation Age of Onset    Early death Mother     Kidney disease Father     Hypertension Father     Heart disease Father     Hyperlipidemia Father     Diabetes Brother     Alcohol abuse Maternal Grandmother     Diabetes Maternal Grandfather     Melanoma Neg Hx      Social History   Substance Use Topics    Smoking status: Former Smoker     Packs/day: 1.00     Years: 10.00    Smokeless tobacco: Never Used    Alcohol use No      Review of Symptoms: nausea and vomiting  Constitutional: Denies fevers, weight loss, chills, or weakness.  Eyes: Denies changes in vision.  ENT: Denies dysphagia, nasal discharge, ear pain or discharge.  Cardiovascular: Denies chest pain, palpitations, orthopnea, or claudication.  Respiratory: Denies shortness of breath, cough, hemoptysis, or wheezing.  GI: Denies nausea/vomitting, hematochezia, melena, abd pain, or changes in appetite.  : Denies dysuria, incontinence, or hematuria.  Musculoskeletal: Denies joint pain or myalgias.  Skin/breast: Denies rashes, lumps, lesions, or discharge.  Neurologic: Denies headache, dizziness, vertigo, or paresthesias.  Psychiatric: Denies changes in mood or hallucinations.  Endocrine: Denies polyuria, polydipsia, heat/cold intolerance.  Hematologic/Lymph: Denies lymphadenopathy, easy bruising or easy bleeding.  Allergic/Immunologic: Denies rash, rhinitis.     OBJECTIVE:   Vital Signs (Most Recent):   Temp: 97.8 °F (36.6 °C) (02/22/17 0755)  Pulse: 77 (02/22/17 1202)  Resp: 13 (02/22/17 1202)  BP: (!) 145/95 (02/22/17 1202)  SpO2: 99 % (02/22/17 1202)    Vital Signs (24h Range):   Temp:  [97.8 °F (36.6 °C)] 97.8 °F (36.6 °C)  Pulse:  [77-95] 77  Resp:  [12-20] 13  SpO2:  [98 %-100 %] 99 %  BP: (133-208)/() 145/95    ICP/CPP (Last 24h):        I & O (Last  24h):  No intake or output data in the 24 hours ending 17 1259  Physical Exam:  GA: Alert, comfortable, no acute distress.   HEENT: No scleral icterus or JVD.   Pulmonary: Clear to auscultation A/P/L. No wheezing, crackles, or rhonchi.  Cardiac: RRR S1 & S2 w/o rubs/murmurs/gallops.   Abdominal: Bowel sounds present x 4. No appreciable hepatosplenomegaly.  Skin: No jaundice, rashes, or visible lesions.  Neuro:  --GCS: E4 V5 M6  --Mental Status:  AAO x3 conversant, cooperative  --CN II-XII grossly intact.   --[id number], PERRL.   --Corneal reflex, gag, cough intact.  --LUE strength: 5/5  --RUE strength: 5/5  --L BKA able to lift  --RLE strength: 5/5    Vent Data:        Lines/Drains/Airway:          Nutrition/Tube Feeds:   Current Diet Order   Procedures    Diet NPO     Till he passes lori       Labs:  ABG: No results for input(s): PH, PO2, PCO2, HCO3, POCSATURATED, BE in the last 24 hours.  BMP:  Recent Labs  Lab 17  1017   *   K 5.2*   CL 93*   CO2 22*   BUN 68*   CREATININE 7.2*   *     LFT: Lab Results   Component Value Date    AST 28 2017    ALT 21 2017    ALKPHOS 176 (H) 2017    BILITOT 1.5 (H) 2017    ALBUMIN 3.5 2017    PROT 9.5 (H) 2017     CBC:   Lab Results   Component Value Date    WBC 5.59 2017    HGB 14.6 2017    HCT 41.0 2017    MCV 87 2017     2017     Microbiology x 7d:   Microbiology Results (last 7 days)     ** No results found for the last 168 hours. **        Imagin/22 CT Head  New small hemorrhage centered in the left caudate lobe head. No significant mass effect/edema.  Multiple remote bilateral basal ganglia, thalamic and brainstem hemorrhages in a distribution highly suggestive of chronic hypertensive encephalopathy.  Chronic microvascular ischemic changes with remote lacunar type infarct within the right thalamus.     CXR   There is cardiac megaly, moderate edema, and a right subclavian  stent.    2/22 KUB  No free air in the abdomen.  No significant bowel dilatation identified.  Vascular calcifications in the pelvis      I personally reviewed the above image.    ASSESSMENT/PLAN:     Patient Active Problem List    Diagnosis Date Noted    Nausea & vomiting 02/22/2017    Asterixis 11/08/2016    Left hemiparesis 11/08/2016    Cardiomyopathy 11/05/2016    Nontraumatic subcortical hemorrhage of right cerebral hemisphere 11/02/2016    Acute respiratory failure with hypoxia 09/14/2016    Hypoalbuminemia 09/14/2016    Acute on chronic diastolic CHF (congestive heart failure) 09/14/2016    Closed head injury 09/08/2016    Encephalopathy 09/02/2016    Cholelithiasis without obstruction 08/25/2015    C. difficile colitis 08/07/2015    Muscular deconditioning 08/07/2015    Renovascular hypertension 08/01/2015    Diastolic heart failure 07/27/2015    Pulmonary hypertension 07/27/2015    Aspiration pneumonia 07/27/2015    Stenosis of arteriovenous dialysis fistula 09/18/2014    Phantom limb pain 04/01/2014    History of left below knee amputation 12/18/13 12/19/2013    Amputation stump pain 09/10/2013    Peripheral vascular disease in diabetes mellitus 07/18/2013    Nontraumatic cortical hemorrhage of left cerebral hemisphere 05/08/2013    Left basal ganglia ICH 5/06/13 05/06/2013    Dyslipidemia 02/07/2013    ESRD on hemodialysis 02/07/2013    Proteinuria 09/17/2012    Anemia in chronic kidney disease 09/17/2012    Secondary hyperparathyroidism of renal origin 09/17/2012    Type 2 diabetes mellitus with chronic kidney disease on chronic dialysis 07/27/2012    Essential hypertension 07/17/2012    Hepatitis C 07/17/2012     Neuro: small L caudate bleed  NSGY consulted  Q1H Neuro checks  CT head revealed small L caudate bleed  Repeat CTH  1630pm 2/22  PT/OT/SLP    Pulmonary:   On room air O2 Sats 98%  Prn CXR/ABG if distress    Cardiac: HTN/ Chronic Diastolic CHF   SBP goal  <160  Amlodipine 10mg daily  Lisinopril 40mg daily  Labetalol 20mg iv x1 in ED  Coreg 12.5mg bid  Echocardiogram pending  ECG pending  BNP pending    Renal:  ESRD  HD MWF/ AV fistula RICHARD  Nephrology consulted for dialysis  Strict I/O  BUN/Cr 68/7/2  Follow CMP    ID:   afebrile   No leukocytosis  Monitor for S/S of infection    Hem/Onc:   H/H WNL  Plts WNL  Pt/INR pending  Follow CBC    Endocrine:  DMII  Accuchecks ac& hs with SSI  A1C pending  TSH pending    Fluids/Electrolytes/Nutrition/GI:   NPO till passes lori and no further N/V  Once he passes LORI may have diabetic/ renal diet  Replace electrolytes per Nephrology    PPX: DVT: SCDs  Constipation: patient having diarrhea    Lines: PIV 2/22    Uninterrupted Critical Care/Counseling Time (not including procedures):  I have spent 35 min with this patient, with over 50% of this time spent coordinating care and speaking with the family.    Paty Marquez [facility]-BC  Neurocritical Care

## 2017-02-23 LAB
ALBUMIN SERPL BCP-MCNC: 3.1 G/DL
ALP SERPL-CCNC: 155 U/L
ALT SERPL W/O P-5'-P-CCNC: 18 U/L
ALT SERPL W/O P-5'-P-CCNC: 19 U/L
AMYLASE SERPL-CCNC: 132 U/L
ANION GAP SERPL CALC-SCNC: 15 MMOL/L
AST SERPL-CCNC: 25 U/L
AST SERPL-CCNC: 26 U/L
BASOPHILS # BLD AUTO: 0.02 K/UL
BASOPHILS NFR BLD: 0.4 %
BILIRUB SERPL-MCNC: 1.2 MG/DL
BUN SERPL-MCNC: 38 MG/DL
CALCIUM SERPL-MCNC: 9.4 MG/DL
CHLORIDE SERPL-SCNC: 97 MMOL/L
CO2 SERPL-SCNC: 24 MMOL/L
CREAT SERPL-MCNC: 4.8 MG/DL
DIASTOLIC DYSFUNCTION: YES
DIFFERENTIAL METHOD: ABNORMAL
EOSINOPHIL # BLD AUTO: 0.1 K/UL
EOSINOPHIL NFR BLD: 1.8 %
ERYTHROCYTE [DISTWIDTH] IN BLOOD BY AUTOMATED COUNT: 16.9 %
EST. GFR  (AFRICAN AMERICAN): 14.9 ML/MIN/1.73 M^2
EST. GFR  (NON AFRICAN AMERICAN): 12.9 ML/MIN/1.73 M^2
ESTIMATED PA SYSTOLIC PRESSURE: 85.56
GLUCOSE SERPL-MCNC: 92 MG/DL
HCT VFR BLD AUTO: 36.1 %
HGB BLD-MCNC: 12.7 G/DL
INR PPP: 1.3
LACTATE SERPL-SCNC: 1.9 MMOL/L
LIPASE SERPL-CCNC: 35 U/L
LYMPHOCYTES # BLD AUTO: 0.8 K/UL
LYMPHOCYTES NFR BLD: 17.4 %
MAGNESIUM SERPL-MCNC: 1.7 MG/DL
MCH RBC QN AUTO: 30.6 PG
MCHC RBC AUTO-ENTMCNC: 35.2 %
MCV RBC AUTO: 87 FL
MITRAL VALVE REGURGITATION: ABNORMAL
MONOCYTES # BLD AUTO: 0.8 K/UL
MONOCYTES NFR BLD: 18.3 %
NEUTROPHILS # BLD AUTO: 2.8 K/UL
NEUTROPHILS NFR BLD: 61.9 %
OB PNL GAST: POSITIVE
PHOSPHATE SERPL-MCNC: 4.6 MG/DL
PLATELET # BLD AUTO: 142 K/UL
PMV BLD AUTO: 10.7 FL
POCT GLUCOSE: 175 MG/DL (ref 70–110)
POCT GLUCOSE: 216 MG/DL (ref 70–110)
POCT GLUCOSE: 281 MG/DL (ref 70–110)
POCT GLUCOSE: 90 MG/DL (ref 70–110)
POTASSIUM SERPL-SCNC: 3.8 MMOL/L
PROT SERPL-MCNC: 8.4 G/DL
PROTHROMBIN TIME: 13.2 SEC
RBC # BLD AUTO: 4.15 M/UL
RETIRED EF AND QEF - SEE NOTES: 50 (ref 55–65)
SODIUM SERPL-SCNC: 136 MMOL/L
T4 FREE SERPL-MCNC: 1.12 NG/DL
TRICUSPID VALVE REGURGITATION: ABNORMAL
WBC # BLD AUTO: 4.49 K/UL

## 2017-02-23 PROCEDURE — 84450 TRANSFERASE (AST) (SGOT): CPT

## 2017-02-23 PROCEDURE — G8978 MOBILITY CURRENT STATUS: HCPCS | Mod: CK

## 2017-02-23 PROCEDURE — 63600175 PHARM REV CODE 636 W HCPCS: Performed by: NURSE PRACTITIONER

## 2017-02-23 PROCEDURE — 36415 COLL VENOUS BLD VENIPUNCTURE: CPT

## 2017-02-23 PROCEDURE — 20000000 HC ICU ROOM

## 2017-02-23 PROCEDURE — 63600175 PHARM REV CODE 636 W HCPCS: Performed by: PSYCHIATRY & NEUROLOGY

## 2017-02-23 PROCEDURE — 63600175 PHARM REV CODE 636 W HCPCS: Performed by: STUDENT IN AN ORGANIZED HEALTH CARE EDUCATION/TRAINING PROGRAM

## 2017-02-23 PROCEDURE — 25000003 PHARM REV CODE 250: Performed by: PSYCHIATRY & NEUROLOGY

## 2017-02-23 PROCEDURE — G8987 SELF CARE CURRENT STATUS: HCPCS | Mod: CL

## 2017-02-23 PROCEDURE — 93306 TTE W/DOPPLER COMPLETE: CPT

## 2017-02-23 PROCEDURE — G8997 SWALLOW GOAL STATUS: HCPCS | Mod: CI

## 2017-02-23 PROCEDURE — 83735 ASSAY OF MAGNESIUM: CPT

## 2017-02-23 PROCEDURE — 83605 ASSAY OF LACTIC ACID: CPT

## 2017-02-23 PROCEDURE — 82150 ASSAY OF AMYLASE: CPT

## 2017-02-23 PROCEDURE — 80053 COMPREHEN METABOLIC PANEL: CPT

## 2017-02-23 PROCEDURE — 99222 1ST HOSP IP/OBS MODERATE 55: CPT | Mod: 25,,, | Performed by: INTERNAL MEDICINE

## 2017-02-23 PROCEDURE — 83690 ASSAY OF LIPASE: CPT

## 2017-02-23 PROCEDURE — 84460 ALANINE AMINO (ALT) (SGPT): CPT

## 2017-02-23 PROCEDURE — G8996 SWALLOW CURRENT STATUS: HCPCS | Mod: CJ

## 2017-02-23 PROCEDURE — 97161 PT EVAL LOW COMPLEX 20 MIN: CPT

## 2017-02-23 PROCEDURE — C9113 INJ PANTOPRAZOLE SODIUM, VIA: HCPCS | Performed by: STUDENT IN AN ORGANIZED HEALTH CARE EDUCATION/TRAINING PROGRAM

## 2017-02-23 PROCEDURE — C9113 INJ PANTOPRAZOLE SODIUM, VIA: HCPCS | Performed by: NURSE PRACTITIONER

## 2017-02-23 PROCEDURE — 85025 COMPLETE CBC W/AUTO DIFF WBC: CPT

## 2017-02-23 PROCEDURE — 97165 OT EVAL LOW COMPLEX 30 MIN: CPT

## 2017-02-23 PROCEDURE — 84439 ASSAY OF FREE THYROXINE: CPT

## 2017-02-23 PROCEDURE — 84100 ASSAY OF PHOSPHORUS: CPT

## 2017-02-23 PROCEDURE — 93306 TTE W/DOPPLER COMPLETE: CPT | Mod: 26,,, | Performed by: INTERNAL MEDICINE

## 2017-02-23 PROCEDURE — G8979 MOBILITY GOAL STATUS: HCPCS | Mod: CK

## 2017-02-23 PROCEDURE — 92610 EVALUATE SWALLOWING FUNCTION: CPT

## 2017-02-23 PROCEDURE — 82271 OCCULT BLOOD OTHER SOURCES: CPT

## 2017-02-23 PROCEDURE — 25000003 PHARM REV CODE 250: Performed by: NURSE PRACTITIONER

## 2017-02-23 PROCEDURE — 25000003 PHARM REV CODE 250: Performed by: STUDENT IN AN ORGANIZED HEALTH CARE EDUCATION/TRAINING PROGRAM

## 2017-02-23 PROCEDURE — 85610 PROTHROMBIN TIME: CPT

## 2017-02-23 PROCEDURE — 90935 HEMODIALYSIS ONE EVALUATION: CPT | Mod: ,,, | Performed by: INTERNAL MEDICINE

## 2017-02-23 PROCEDURE — G8988 SELF CARE GOAL STATUS: HCPCS | Mod: CJ

## 2017-02-23 RX ORDER — PANTOPRAZOLE SODIUM 40 MG/1
40 TABLET, DELAYED RELEASE ORAL DAILY
Status: DISCONTINUED | OUTPATIENT
Start: 2017-02-23 | End: 2017-02-23

## 2017-02-23 RX ORDER — LANTHANUM CARBONATE 500 MG/1
1000 TABLET, CHEWABLE ORAL
Status: DISCONTINUED | OUTPATIENT
Start: 2017-02-23 | End: 2017-02-25 | Stop reason: HOSPADM

## 2017-02-23 RX ORDER — CARVEDILOL 12.5 MG/1
12.5 TABLET ORAL 2 TIMES DAILY
Status: DISCONTINUED | OUTPATIENT
Start: 2017-02-23 | End: 2017-02-25 | Stop reason: HOSPADM

## 2017-02-23 RX ORDER — LISINOPRIL 20 MG/1
40 TABLET ORAL DAILY
Status: DISCONTINUED | OUTPATIENT
Start: 2017-02-23 | End: 2017-02-24

## 2017-02-23 RX ORDER — ONDANSETRON 2 MG/ML
8 INJECTION INTRAMUSCULAR; INTRAVENOUS EVERY 8 HOURS PRN
Status: DISCONTINUED | OUTPATIENT
Start: 2017-02-23 | End: 2017-02-25 | Stop reason: HOSPADM

## 2017-02-23 RX ORDER — HYDRALAZINE HYDROCHLORIDE 20 MG/ML
10 INJECTION INTRAMUSCULAR; INTRAVENOUS EVERY 4 HOURS PRN
Status: DISCONTINUED | OUTPATIENT
Start: 2017-02-23 | End: 2017-02-25

## 2017-02-23 RX ORDER — DIPHENHYDRAMINE HYDROCHLORIDE 50 MG/ML
25 INJECTION INTRAMUSCULAR; INTRAVENOUS ONCE
Status: COMPLETED | OUTPATIENT
Start: 2017-02-23 | End: 2017-02-23

## 2017-02-23 RX ORDER — CINACALCET 60 MG/1
60 TABLET, FILM COATED ORAL
Status: DISCONTINUED | OUTPATIENT
Start: 2017-02-24 | End: 2017-02-25 | Stop reason: HOSPADM

## 2017-02-23 RX ORDER — AMLODIPINE BESYLATE 10 MG/1
10 TABLET ORAL EVERY MORNING
Status: DISCONTINUED | OUTPATIENT
Start: 2017-02-23 | End: 2017-02-25 | Stop reason: HOSPADM

## 2017-02-23 RX ORDER — METOCLOPRAMIDE HYDROCHLORIDE 5 MG/ML
10 INJECTION INTRAMUSCULAR; INTRAVENOUS ONCE
Status: COMPLETED | OUTPATIENT
Start: 2017-02-23 | End: 2017-02-23

## 2017-02-23 RX ORDER — PANTOPRAZOLE SODIUM 40 MG/10ML
40 INJECTION, POWDER, LYOPHILIZED, FOR SOLUTION INTRAVENOUS 2 TIMES DAILY
Status: DISCONTINUED | OUTPATIENT
Start: 2017-02-23 | End: 2017-02-23

## 2017-02-23 RX ORDER — PANTOPRAZOLE SODIUM 40 MG/10ML
40 INJECTION, POWDER, LYOPHILIZED, FOR SOLUTION INTRAVENOUS ONCE
Status: COMPLETED | OUTPATIENT
Start: 2017-02-23 | End: 2017-02-23

## 2017-02-23 RX ORDER — LABETALOL HYDROCHLORIDE 5 MG/ML
10 INJECTION, SOLUTION INTRAVENOUS EVERY 4 HOURS PRN
Status: DISCONTINUED | OUTPATIENT
Start: 2017-02-23 | End: 2017-02-25 | Stop reason: HOSPADM

## 2017-02-23 RX ORDER — ONDANSETRON 2 MG/ML
4 INJECTION INTRAMUSCULAR; INTRAVENOUS EVERY 8 HOURS PRN
Status: DISCONTINUED | OUTPATIENT
Start: 2017-02-23 | End: 2017-02-23

## 2017-02-23 RX ORDER — AMOXICILLIN 250 MG
1 CAPSULE ORAL DAILY PRN
Status: DISCONTINUED | OUTPATIENT
Start: 2017-02-23 | End: 2017-02-25 | Stop reason: HOSPADM

## 2017-02-23 RX ADMIN — HYDRALAZINE HYDROCHLORIDE 10 MG: 20 INJECTION INTRAMUSCULAR; INTRAVENOUS at 11:02

## 2017-02-23 RX ADMIN — ONDANSETRON 4 MG: 2 INJECTION INTRAMUSCULAR; INTRAVENOUS at 02:02

## 2017-02-23 RX ADMIN — LABETALOL HYDROCHLORIDE 10 MG: 5 INJECTION, SOLUTION INTRAVENOUS at 07:02

## 2017-02-23 RX ADMIN — INSULIN ASPART 1 UNITS: 100 INJECTION, SOLUTION INTRAVENOUS; SUBCUTANEOUS at 11:02

## 2017-02-23 RX ADMIN — ONDANSETRON 4 MG: 2 INJECTION INTRAMUSCULAR; INTRAVENOUS at 11:02

## 2017-02-23 RX ADMIN — PANTOPRAZOLE SODIUM 40 MG: 40 INJECTION, POWDER, FOR SOLUTION INTRAVENOUS at 08:02

## 2017-02-23 RX ADMIN — PROMETHAZINE HYDROCHLORIDE 12.5 MG: 25 INJECTION INTRAMUSCULAR; INTRAVENOUS at 03:02

## 2017-02-23 RX ADMIN — Medication 3 ML: at 10:02

## 2017-02-23 RX ADMIN — CARVEDILOL 12.5 MG: 12.5 TABLET, FILM COATED ORAL at 08:02

## 2017-02-23 RX ADMIN — Medication 3 ML: at 03:02

## 2017-02-23 RX ADMIN — HYDRALAZINE HYDROCHLORIDE 10 MG: 20 INJECTION INTRAMUSCULAR; INTRAVENOUS at 05:02

## 2017-02-23 RX ADMIN — LABETALOL HYDROCHLORIDE 10 MG: 5 INJECTION, SOLUTION INTRAVENOUS at 02:02

## 2017-02-23 RX ADMIN — HYDRALAZINE HYDROCHLORIDE 10 MG: 20 INJECTION INTRAMUSCULAR; INTRAVENOUS at 09:02

## 2017-02-23 RX ADMIN — DIPHENHYDRAMINE HYDROCHLORIDE 25 MG: 50 INJECTION, SOLUTION INTRAMUSCULAR; INTRAVENOUS at 04:02

## 2017-02-23 RX ADMIN — PANTOPRAZOLE SODIUM 40 MG: 40 INJECTION, POWDER, FOR SOLUTION INTRAVENOUS at 05:02

## 2017-02-23 RX ADMIN — LABETALOL HYDROCHLORIDE 10 MG: 5 INJECTION, SOLUTION INTRAVENOUS at 08:02

## 2017-02-23 RX ADMIN — METOCLOPRAMIDE 10 MG: 5 INJECTION, SOLUTION INTRAMUSCULAR; INTRAVENOUS at 04:02

## 2017-02-23 RX ADMIN — INSULIN ASPART 6 UNITS: 100 INJECTION, SOLUTION INTRAVENOUS; SUBCUTANEOUS at 12:02

## 2017-02-23 NOTE — PROGRESS NOTES
Notified MARYJO Olvera with NCC regarding pt c/o nausea and vomiting. Zofran IV given at 1125 but ordered q 12.   Dr. Marrero at bedside at 1350. Orders obtained. Will continue to monitor very closely.

## 2017-02-23 NOTE — PROGRESS NOTES
Notified Dr. Marrero with NCC regarding pt still vomiting, emesis ~800mls, liquid and very dark brown almost black in appearance. Phenergan prn dose given, pt reports no nausea relief. MRI continues to be pending due to vomiting. Dr. Marrero at bedside at 1425.  Will continue to monitor very closely.

## 2017-02-23 NOTE — PLAN OF CARE
Problem: Patient Care Overview  Goal: Plan of Care Review  Outcome: Ongoing (interventions implemented as appropriate)  No acute events occurred throughout the shift. See flowsheets for assessments and VS. 3 hours of hemodialysis performed. 2 liters of fluid removed. Plan of care reviewed with Vaughn Retana and Vaughn Retana's family. All questions answered in turn. Pt progressing towards goals as noted. Will continue to monitor.

## 2017-02-23 NOTE — PLAN OF CARE
02/23/17 1346   Discharge Assessment   Assessment Type Discharge Planning Assessment   Confirmed/corrected address and phone number on facesheet? No  (Per patient, his address is not correct on facesheet and he requested that CM phone his sister for the correct address.  CM unable to reach sister by phone.  Will retry )   Assessment information obtained from? Patient;Caregiver   Expected Length of Stay (days) 4   Communicated expected length of stay with patient/caregiver yes   Prior to hospitilization cognitive status: Alert/Oriented   Prior to hospitalization functional status: Independent   Current cognitive status: Alert/Oriented   Current Functional Status: Needs Assistance   Arrived From admitted as an inpatient   Lives With other relative(s)  (niece)   Able to Return to Prior Arrangements unable to determine at this time (comments)   Is patient able to care for self after discharge? Unable to determine at this time (comments)   How many people do you have in your home that can help with your care after discharge? 1   Who are your caregiver(s) and their phone number(s)? Alicia Retana (sister) [phone]   Patient's perception of discharge disposition home or selfcare   Readmission Within The Last 30 Days no previous admission in last 30 days   Patient currently being followed by outpatient case management? No   Patient currently receives home health services? No   Does the patient currently use HME? No   Patient currently receives private duty nursing? N/A   Patient currently receives any other outside agency services? No   Equipment Currently Used at Home prosthesis   Do you have any problems affording any of your prescribed medications? TBD   Is the patient taking medications as prescribed? yes   Do you have any financial concerns preventing you from receiving the healthcare you need? No   Does the patient have transportation to healthcare appointments? Yes   Transportation Available family or  friend will provide   On Dialysis? Yes   If yes, what is the name of the dialysis unit? [facility], M-W-F   Does the patient receive outpatient dialysis? Yes   Does the patient receive services at the Coumadin Clinic? No   Are there any open cases? No   Discharge Plan A Home with family   Discharge Plan B Home with family;Home Health   Patient/Family In Agreement With Plan yes           PCP:    Ariana Vazquez MD        Pharmacy:    [facility]/pharmacy #[id number] [place], [place] - [address] [address]  Phone: [phone] Fax: [phone]    [facility] - [place], [place] - [address] [place] [address]  Phone: [phone] Fax: [phone]        Emergency Contacts:  Extended Emergency Contact Information  Primary Emergency Contact: Alicia Retana   [place]  Mobile Phone: [phone]  Relation: Sister  Secondary Emergency Contact: Daisy Longoria   [facility]  Home Phone: [phone]  Relation: Daughter      Insurance:  Payor: MEDICARE / Plan: MEDICARE PART A & B / Product Type: Government /       Mima Berger RN, CCRN-K, [facility]  Neuro-Critical Care   [phone]

## 2017-02-23 NOTE — PLAN OF CARE
Problem: Occupational Therapy Goal  Goal: Occupational Therapy Goal  Goals to be met by: 3/2     Patient will increase functional independence with ADLs by performing:    UE Dressing with Stand-by Assistance.  LE Dressing with Stand-by Assistance.  Grooming while standing with Stand-by Assistance.  Toileting from bedside commode with Stand-by Assistance for hygiene and clothing management.   Rolling to Bilateral with Modified [place].   Supine to sit with Modified [place].  Stand pivot transfers with Stand-by Assistance.  Outcome: Ongoing (interventions implemented as appropriate)  OT eval completed.

## 2017-02-23 NOTE — PT/OT/SLP EVAL
Speech Language Pathology  Evaluation    Vaughn Retana   MRN: [id number]   Admitting Diagnosis: Left-sided nontraumatic intracerebral hemorrhage    Diet recommendations: Solid Diet Level: Dental Soft  Liquid Diet Level: Thin      Feed only when awake/alert, HOB to 90 degrees, Small bites/sips, Alternating bites/sips, 1 bite/sip at a time, Check for pocketing/oral residue and Meds whole 1 at a time    SLP Treatment Date: 17  Speech Start Time: 1010     Speech Stop Time: 1019     Speech Total (min): 9 min       TREATMENT BILLABLE MINUTES:  Eval Swallow and Oral Function 9    Diagnosis: Left-sided nontraumatic intracerebral hemorrhage      Past Medical History   Diagnosis Date    Acute on chronic diastolic CHF (congestive heart failure) 2016    Anemia     Chronic low back pain 2015    Diabetes mellitus, type II     Diabetic neuropathy     ESRD on hemodialysis     Hepatitis C      states hepatitis B    Hypertension     Left basal ganglia ICH 2013    Metabolic acidosis, IAG, reduced excretion of inorganic acids     Myoclonic jerking 2016    Obesity     Secondary hyperparathyroidism (of renal origin)     TB lung, latent 2015    Thyroid disease      Past Surgical History   Procedure Laterality Date    R avf  12    Leg amputation through knee  2013     left BKA    Foot amputation through metatarsal       left foot    Colonoscopy         Has the patient been evaluated by SLP for swallowing? : Yes  Keep patient NPO?: No   General Precautions: Standard,                Prior diet: Per SLP note 16, regular diet and thin liquids  Pt reported tolerating softer foods pta.     Subjective:  Pt awake; pleasant  Patient goals: did not state    Pain Ratin/10              Pain Rating Post-Intervention: 0/10    Objective:        Oral Musculature Evaluation  Oral Musculature: general weakness  Dentition: present and adequate  Mucosal Quality: good  Mandibular Strength  and Mobility: WFL  Oral Labial Strength and Mobility: WFL  Lingual Strength and Mobility: WFL  Buccal Strength and Mobility: WFL  Volitional Cough: able to demonstrate  Volitional Swallow: able to demonstrate  Voice Prior to PO Intake: clear     Bedside Swallow Eval:  Consistencies Assessed: Thin liquids 4 oz via straw, Puree 3 full spoonfuls and Solids 1 isabel cracker  Oral Phase: mild excess in mastication of cracker with mild diffuse oral stasis post PO; benefited from liquid washes to clearn  Pharyngeal Phase: no overt clinical  signs/symptoms of aspiration    Additional Treatment:    Pt just finished eating breakfast upon entry to room; mild lingual stasis present in oral cavity. Pt with X1 cough with ejection of saliva/food mix prior to initiation of PO trials; pt reported it to be grits. Skilled education provided on aspiration precautions and diet recommendations. Whiteboard updated with diet recommendations/aspiration precautions. No further questions.                                   Assessment:  Vaughn Retana is a 51 y.o. male with a medical diagnosis of Left-sided nontraumatic intracerebral hemorrhage and presents with mild oral dysphagia.     Do you have any cultural, spiritual, Latter day conflicts, given your current situation?: no     Discharge recommendations: Discharge Facility/Level Of Care Needs:  (pending PT OT recs; SLP post DC rec'd)     Goals:   SLP Goals        Problem: SLP Goal    Goal Priority Disciplines Outcome   SLP Goal     SLP    Description:  SLP Plan of Care: Speech Pathology will follow pt daily M-F and address the following goals x1 week:  3/2  1. Pt will tolerate dental soft diet and thin liquids without s/s of airway compromise.   2. Pt will perform oral strengthening exercises X10 per session provided min cues to improve safe swallow function.  3. Pt will tolerate trials of regular solids with adequate oral clearance.  4. If MD agrees, pt will participate in speech  language cognitive eval to determine need for intervention.                    Plan:   Patient to be seen Therapy Frequency: 5 x/week   Plan of Care expires: 03/24/17  Plan of Care reviewed with: patient  SLP Follow-up?: Yes  SLP - Next Visit Date: 02/24/17           Cathy London M.A. CCC-SLP  Speech Language Pathologist  [phone]  2/23/2017

## 2017-02-23 NOTE — PROGRESS NOTES
Notified MD Marco with NCC regarding pt vomiting even after zofran prn dose given at ~1415. Ordered phenergan and KUB. Will continue to monitor very closely.

## 2017-02-23 NOTE — PT/OT/SLP EVAL
Physical Therapy  Evaluation    Vaughn Retana   MRN: [id number]   Admitting Diagnosis: Left-sided nontraumatic intracerebral hemorrhage    PT Received On: 02/23/17  PT Start Time: 1035     PT Stop Time: 1045    PT Total Time (min): 10 min       Billable Minutes:  Evaluation 10    Diagnosis: Left-sided nontraumatic intracerebral hemorrhage      Past Medical History   Diagnosis Date    Acute on chronic diastolic CHF (congestive heart failure) 9/14/2016    Anemia     Chronic low back pain 12/1/2015    Diabetes mellitus, type II     Diabetic neuropathy     ESRD on hemodialysis     Hepatitis C      states hepatitis B    Hypertension     Left basal ganglia ICH 5/6/2013    Metabolic acidosis, IAG, reduced excretion of inorganic acids     Myoclonic jerking 9/20/2016    Obesity     Secondary hyperparathyroidism (of renal origin)     TB lung, latent 8/25/2015    Thyroid disease       Past Surgical History   Procedure Laterality Date    R avf  9/12/12    Leg amputation through knee  12/18/2013     left BKA    Foot amputation through metatarsal       left foot    Colonoscopy         Referring physician: Paty Marquez NP  Date referred to PT: 02/22/17       General Precautions: Standard, fall, aspiration       Do you have any cultural, spiritual, Quaker conflicts, given your current situation?: none stated    Patient History:  Living Environment Comment: Pt lives with niece in a 2nd floor apartment with ~1 flight of stairs to enter, B HR. Owns TTB & hand held shower hose. PTA, pt was (I) with all mobility using no AD. Pt has L BKA & (I) with prothestic. (I) with all ADLs & self care.   Equipment Currently Used at Home: prosthesis      Previous Level of Function:  Ambulation Skills: independent  Transfer Skills: independent  ADL Skills: independent    Subjective:  Communicated with RN prior to session.  Spoke with Wade Burnett NP regarding active Bed Rest orders. Received verbal to discontinue orders  and pt cleared to sit up in chair.     Pt agreeable to PT evaluation. Pt sitting on EOB with OT upon PT entering.     Chief Complaint: none stated  Patient goals: To get better and go home    Pain Ratin/10   Pain Rating Post-Intervention: 0/10    Objective:   Patient found with: blood pressure cuff, telemetry, pulse ox (continuous)     Cognitive Exam:  Oriented to: Person, Place, Time and Situation    Follows Commands/attention: Follows multistep  commands  Communication: clear/fluent  Safety awareness/insight to disability: intact    Physical Exam:  Postural examination/scapula alignment: Rounded shoulder and Head forward    Skin integrity: Visible skin intact  Edema: None noted     Sensation:   Intact      Lower Extremity Range of Motion:  Right Lower Extremity: WFL (HS tightness)  Left Lower Extremity: L BKA; hip & knee ROM WFL (HS tightness)    Lower Extremity Strength:  Right Lower Extremity: WFL  Left Lower Extremity: Hip flexion & knee ext WFL; grossly 4+/5     Fine motor coordination:  Intact    Gross motor coordination: WFL    Functional Mobility:  Bed Mobility:   Not assessed this visit. See OT eval. Pt already sitting EOB with OT.     Transfers:  Sit <> Stand Assistance: Minimum Assistance (from EOB)  Sit <> Stand Assistive Device:  (B HHA)  Bed <> Chair Technique: Stand Pivot  Bed <> Chair Assistance: Minimum Assistance (to the Right)  Bed <> Chair Assistive Device: No Assistive Device    Gait:   Gait Distance: unable to attempt this visit    Stairs:  Not assessed 2° ICU lines monitoring    Balance:   Static Sit: GOOD: Takes MODERATE challenges from all directions  Dynamic Sit: FAIR+: Maintains balance through MINIMAL excursions of active trunk motion  Static Stand: POOR+: Needs MINIMAL assist to maintain  Dynamic stand: FAIR: Needs CONTACT GUARD during gait     Pt demonstrated posterior lean with dynamic sitting & attempt to don socks with figure 4. CGA for safety.     Education:  Education provided  to pt regarding: PT role/POC; safety with mobility. Verbalized understanding.     Whiteboard updated with correct mobility information. RN/PCT notified.  Pt ok to transfer to BS or bedside chair with RN/PCT. Transfer to pt's Right side.      AM-PAC 6 CLICK MOBILITY  How much help from another person does this patient currently need?   1 = Unable, Total/Dependent Assistance  2 = A lot, Maximum/Moderate Assistance  3 = A little, Minimum/Contact Guard/Supervision  4 = None, Modified [place]/Independent    Turning over in bed (including adjusting bedclothes, sheets and blankets)?: 3  Sitting down on and standing up from a chair with arms (e.g., wheelchair, bedside commode, etc.): 3  Moving from lying on back to sitting on the side of the bed?: 3  Moving to and from a bed to a chair (including a wheelchair)?: 3  Need to walk in hospital room?: 3  Climbing 3-5 steps with a railing?: 2  Total Score: 17     AM-PAC Raw Score CMS G-Code Modifier Level of Impairment Assistance   6 % Total / Unable   7 - 9 CM 80 - 100% Maximal Assist   10 - 14 CL 60 - 80% Moderate Assist   15 - 19 CK 40 - 60% Moderate Assist   20 - 22 CJ 20 - 40% Minimal Assist   23 CI 1-20% SBA / CGA   24 CH 0% Independent/ Mod I     Patient left up in chair with all lines intact and call button in reach.    Assessment:   Vaughn Retana is a 51 y.o. male with a medical diagnosis of Left-sided nontraumatic intracerebral hemorrhage and presents with decrease endurance, strength, balance, and coordination impairing overall safety with  mobility.  Pt tolerated session well with no increase in pain/discomfort. Pt demonstrated good mobility and participation this visit being able to transfer to bedside chair. Will attempt gait next visit.  Pt would benefit from continued skilled physical therapy for the listed impairments to improve functional independence and overall safety with mobility prior to d/c. PT recommends d/c disposition to Rehab for  further IP therapy. Pt demonstrates good potential to progress and would benefit from 3 h/day to maximize recovery to gain functional independence prior to d/c home.      Pt's history is positive for the following co-morbidities impacting current health status and course of care: CHF, DM2, HTN, ESRD, and Hep C. Impairments listed below.  Prior to admit, pt was (I) with mobility/transfers and with ADLs/self-care. Pt lives in a 2nd floor apartment with a full flight of stairs to reach door. At this time, pt is unable to ambulate stairs safely 2/2 weakness.  Unsure if pt's will have  supervision/assist available upon d/c. Will continue to reassess and change d/c recommendation as appropriate.   .    Rehab identified problem list/impairments: Rehab identified problem list/impairments: weakness, impaired endurance, impaired functional mobilty, impaired balance, gait instability, decreased lower extremity function, impaired muscle length    Rehab potential is good.    Activity tolerance: Good    Discharge recommendations: Discharge Facility/Level Of Care Needs: rehabilitation facility     Barriers to discharge: Barriers to Discharge: Inaccessible home environment, Decreased caregiver support (stairs to enter)    Equipment recommendations: Equipment Needed After Discharge:  (TBD pending progress)     GOALS:   Physical Therapy Goals        Problem: Physical Therapy Goal    Goal Priority Disciplines Outcome Goal Variances Interventions   Physical Therapy Goal     PT/OT, PT      Description:  Goals to be met by: 3/4/17     Patient will increase functional independence with mobility by performin. Supine to sit with Contact Guard Assistance  2. Sit to supine with Contact Guard Assistance  3. Sit to stand transfer with Stand-by Assistance  4. Bed to chair transfer with Stand-by Assistance using Rolling Walker  5. Gait  x 50 feet with Contact Guard Assistance using Rolling Walker.   6. Ascend/descend 5 stair with right  Handrails Minimal Assistance.   7. Lower extremity exercise program x10-15 reps with supervision                PLAN:    Patient to be seen 6 x/week to address the above listed problems via gait training, therapeutic activities, therapeutic exercises, neuromuscular re-education  Plan of Care expires: 03/25/17  Plan of Care reviewed with: patient    Functional Assessment Tool Used: [facility]  Score: 17  Functional Limitation: Mobility: Walking and moving around  Mobility: Walking and Moving Around Current Status (): AAMIR  Mobility: Walking and Moving Around Goal Status (): AAMIR Humphreys, PT  02/23/2017

## 2017-02-23 NOTE — PROGRESS NOTES
Progress Note  Nephrology    Admit Date: 2/22/2017   LOS: 1 day     SUBJECTIVE:     Follow-up For: ESRD     Interval follow up: NAEON. Patient had HD done yesterday and tolerated it well. UF 2L. No complaints at the time of evaluation. Patient room air with SpO2: 98%.     OBJECTIVE:     Vital Signs (Most Recent)  Temp: 98.1 °F (36.7 °C) (02/23/17 0702)  Pulse: 78 (02/23/17 0802)  Resp: 20 (02/23/17 0802)  BP: (!) 160/103 (02/23/17 0802)  SpO2: 98 % (02/23/17 0802)    Vital Signs Range (Last 24H):  Temp:  [97.9 °F (36.6 °C)-98.4 °F (36.9 °C)]   Pulse:  [73-84]   Resp:  [12-33]   BP: (122-187)/()   SpO2:  [94 %-100 %]        Physical Exam:   General: AAM in NAD. AOx4.  Neck: Supple, symmetrical, trachea midline, no thyromegaly, no JVD  Respiratory: Clear to auscultation bilaterally, respirations unlabored, no rales/rhonchi/wheezing  Cardiovacular: Regular rate and rhythm, S1, S2 normal, no murmurs, rubs or gallops  Gastrointestinal: Soft, non-tender, bowel sounds normal, no hepatosplenomegaly  Extremities: No clubbing or cyanosis of bilateral upper extremities; L BKA. No lower extremity edema  Skin: warm and dry; no rash on exposed skin    Laboratory:  CBC:   Recent Labs  Lab 02/23/17 0136   WBC 4.49   RBC 4.15*   HGB 12.7*   HCT 36.1*   *   MCV 87   MCH 30.6   MCHC 35.2     CMP:   Recent Labs  Lab 02/23/17 0136   GLU 92   CALCIUM 9.4   ALBUMIN 3.1*   PROT 8.4      K 3.8   CO2 24   CL 97   BUN 38*   CREATININE 4.8*   ALKPHOS 155*   ALT 18   AST 25   BILITOT 1.2*       Diagnostic Results:  Labs: Reviewed  X-Ray: Reviewed    ASSESSMENT/PLAN:     Active Hospital Problems    Diagnosis  POA    *Left-sided nontraumatic intracerebral hemorrhage [I61.9]  Unknown    Nausea & vomiting [R11.2]  Yes      Resolved Hospital Problems    Diagnosis Date Resolved POA   No resolved problems to display.     50 yo AAM with medical history consiting of ESRD on dialysis MWF, previous L basal ganglia ICH 9/16,  anemia,DMII, Hepatitis C, HTN, acute on chronic diastolic CHF, L BKA He presents today with 2 day history of nausea and vomiting. Typically dialyzes [facility]WF at [facility]. Access is AVF.    ESRD on IHD MWF  - Had HD yesterday and tolerated it well  - UF 2L  - No major acidosis or electrolyte imbalances  - Will plan for next HD treatment tomorrow as per patient regular schedule     Anemia of Chronic Kidney Disease   - Hgb currently at goal for ESRD  - H/H: 12.7/ 36.1    Mineral Bone Disease in CKD   - Already on renal diet   - Start patient on Sensipar and Lanthanum (binder) as taken as outpatient     Sebas Catalan   Nephrology fellow PGY- 5  [phone]    Patient seen and examined with Dr Catalan;   I have reviewed and agree with assessment and plan

## 2017-02-23 NOTE — PT/OT/SLP EVAL
Occupational Therapy  Evaluation    Vaughn Retana   MRN: [id number]   Admitting Diagnosis: Left-sided nontraumatic intracerebral hemorrhage    OT Date of Treatment: 02/23/17   OT Start Time: 1028  OT Stop Time: 1046  OT Total Time (min): 18 min    Billable Minutes:  Evaluation 18    Diagnosis: Left-sided nontraumatic intracerebral hemorrhage   Headache, nausea, vomiting x 2 days    Past Medical History   Diagnosis Date    Acute on chronic diastolic CHF (congestive heart failure) 9/14/2016    Anemia     Chronic low back pain 12/1/2015    Diabetes mellitus, type II     Diabetic neuropathy     ESRD on hemodialysis     Hepatitis C      states hepatitis B    Hypertension     Left basal ganglia ICH 5/6/2013    Metabolic acidosis, IAG, reduced excretion of inorganic acids     Myoclonic jerking 9/20/2016    Obesity     Secondary hyperparathyroidism (of renal origin)     TB lung, latent 8/25/2015    Thyroid disease       Past Surgical History   Procedure Laterality Date    R avf  9/12/12    Leg amputation through knee  12/18/2013     left BKA    Foot amputation through metatarsal       left foot    Colonoscopy         Referring physician: MARYJO Marquez  Date referred to OT: 2/22    General Precautions: Standard, fall, aspiration (MARYJO Burnett verbally approved OOB to chair on this date)    Do you have any cultural, spiritual, Druze conflicts, given your current situation?: Religion     Patient History:  Living Environment  Living Environment Comment: Pt resides with niece in [place] in 2nd floor apartment with ~ 12-13 steps & (B) rails to enter.  Pt owns TTB, hand held shower hose, & prosthetic leg.    Prior level of function:   Bed Mobility/Transfers: independent  Grooming: independent  Bathing: needs device (TTB)  Upper Body Dressing: independent  Lower Body Dressing: independent  Toileting: independent  Driving License: Yes  Occupation: On disability  Leisure and Hobbies: fishing  IADL Comments: Pt  has bathtub with TTB and hand held shower hose for bathing.     Dominant hand: right    Subjective:  Communicated with RN prior to session.  Pt with no complaints.  Chief Complaint: none  Patient/Family stated goals: to get back home    Pain Ratin/10  Pain Rating Post-Intervention: 0/10    Objective:  Patient found with: blood pressure cuff, pulse ox (continuous), telemetry    Cognitive Exam:  Oriented to: Person, Place, Time and Situation  Follows Commands/attention: Follows one-step commands  Communication: clear/fluent  Memory:  No Deficits noted  Safety awareness/insight to disability: intact  Coping skills/emotional control: Appropriate to situation    Visual/perceptual:  Intact    Physical Exam:  Postural examination/scapula alignment: Rounded shoulder, Head forward and Posterior pelvic tilt  Skin integrity: Visible skin intact  Edema: None noted BUE    Sensation:   Intact  light/touch BUE    Upper Extremity Range of Motion:  Right Upper Extremity: WFL  Left Upper Extremity: WFL    Upper Extremity Strength:  Right Upper Extremity: WFL  Left Upper Extremity: WFL   Strength: WFL    Fine motor coordination:   Intact  Left hand thumb/finger opposition skills and Right hand thumb/finger opposition skills    Gross motor coordination: WFL    Functional Mobility:  Bed Mobility:  Supine to Sit: Minimum Assistance    Transfers:  Sit <> Stand Assistance: Minimum Assistance (from EOB)  Sit <> Stand Assistive Device: No Assistive Device  Bed <> Chair Technique: Stand Pivot  Bed <> Chair Transfer Assistance: Minimum Assistance  Bed <> Chair Assistive Device: No Assistive Device    Activities of Daily Living:     UE Dressing Level of Assistance: Moderate assistance (seated EOB)  LE Dressing Level of Assistance: Total assistance (seated EOB)  Grooming Position: Seated, EOB  Grooming Level of Assistance: Stand by assistance     Therapeutic Activities and Exercises:  Pt required SBA-Mod (A) with postural control while  "seated EOB.    AM-PAC 6 CLICK ADL  How much help from another person does this patient currently need?  1 = Unable, Total/Dependent Assistance  2 = A lot, Maximum/Moderate Assistance  3 = A little, Minimum/Contact Guard/Supervision  4 = None, Modified [place]/Independent    Putting on and taking off regular lower body clothing? : 1  Bathing (including washing, rinsing, drying)?: 2  Toileting, which includes using toilet, bedpan, or urinal? : 2  Putting on and taking off regular upper body clothing?: 2  Taking care of personal grooming such as brushing teeth?: 3  Eating meals?: 3  Total Score: 13    AM-PAC Raw Score CMS "G-Code Modifier Level of Impairment Assistance   6 % Total / Unable   7 - 9 CM 80 - 100% Maximal Assist   10 - 14 CL 60 - 80% Moderate Assist   15 - 19 CK 40 - 60% Moderate Assist   20 - 22 CJ 20 - 40% Minimal Assist   23 CI 1-20% SBA / CGA   24 CH 0% Independent/ Mod I       Patient left up in chair with all lines intact, call button in reach, RN notified and white board updated.    Assessment:  Vaughn Retana is a 51 y.o. male with a medical diagnosis of Left-sided nontraumatic intracerebral hemorrhage and presents with decreased independence with ADL's due to IPH.  Pt would benefit from OT to address independence with ADL's.    Rehab identified problem list/impairments: Rehab identified problem list/impairments: weakness, impaired endurance, impaired self care skills, impaired functional mobilty, impaired cognition, decreased coordination, impaired balance, decreased upper extremity function, decreased lower extremity function, decreased safety awareness    Rehab potential is fair.    Activity tolerance: Fair    Discharge recommendations: Discharge Facility/Level Of Care Needs: rehabilitation facility     GOALS:   Occupational Therapy Goals        Problem: Occupational Therapy Goal    Goal Priority Disciplines Outcome Interventions   Occupational Therapy Goal     OT, PT/OT Ongoing " (interventions implemented as appropriate)    Description:  Goals to be met by: 3/2     Patient will increase functional independence with ADLs by performing:    UE Dressing with Stand-by Assistance.  LE Dressing with Stand-by Assistance.  Grooming while standing with Stand-by Assistance.  Toileting from bedside commode with Stand-by Assistance for hygiene and clothing management.   Rolling to Bilateral with Modified [place].   Supine to sit with Modified [place].  Stand pivot transfers with Stand-by Assistance.                PLAN:  Patient to be seen 6 x/week to address the above listed problems via self-care/home management, cognitive retraining, sensory integration, therapeutic activities, therapeutic exercises, neuromuscular re-education  Plan of Care expires: 03/25/17  Plan of Care reviewed with: patient    OT G-codes  Functional Assessment Tool Used: AMPAC  Score: 13  Functional Limitation: Self care  Self Care Current Status (): CL  Self Care Goal Status (): WILBUR Thomas  02/23/2017

## 2017-02-23 NOTE — PROGRESS NOTES
Pt AAOx4 bedside HD treatment initiated via RICHARD fistula without difficulty. Primary nurse notified.

## 2017-02-23 NOTE — PROGRESS NOTES
Notified MD Marco with [facility] regarding pt remains with N & V and unable to take pt down to MRI. Protonix po held d/t vomiting, suggestion made to change form PO to IVP. No new orders obtained at this time. Will continue to monitor very closely.

## 2017-02-23 NOTE — PLAN OF CARE
Problem: SLP Goal  Goal: SLP Goal  SLP Plan of Care: Speech Pathology will follow pt daily M-F and address the following goals x1 week:  3/2  1. Pt will tolerate dental soft diet and thin liquids without s/s of airway compromise.   2. Pt will perform oral strengthening exercises X10 per session provided min cues to improve safe swallow function.  3. Pt will tolerate trials of regular solids with adequate oral clearance.  4. If MD agrees, pt will participate in speech language cognitive eval to determine need for intervention.      Swallow evaluation completed with initiated POC.     Cathy London M.A. CCC-SLP  Speech Language Pathologist  [phone]  2/23/2017

## 2017-02-23 NOTE — PROGRESS NOTES
ICU Progress Note  Neurocritical Care    Admit Date: 2/22/2017  LOS: 1    CC: Left-sided nontraumatic intracerebral hemorrhage  Code Status: Full Code     SUBJECTIVE:     HPI:  51-year-old black male with history of ESRD, dialysis MWF, previous L basal ganglia ICH 9/16, anemia,DMII, Hepatitis C, HTN, acute on chronic diastolic CHF, L BKA He presents today with 2 day history of nausea and vomiting    Interval History/Significant Events: NAEON, VSS.  3 hours of hemodialysis last night.    Review of Symptoms:   Constitutional: Denies fevers or chills.  Pulmonary: Denies shortness of breath or cough.  Cardiology: Denies chest pain or palpitations.  GI: Denies abdominal pain or constipation.  Neurologic: Denies new weakness, headaches, or paresthesias.     Medications:  Continuous Infusions:   Scheduled Meds:   sodium chloride 0.9%  3 mL Intravenous Q8H     PRN Meds:.acetaminophen, dextrose 50%, dextrose 50%, glucagon (human recombinant), glucose, glucose, hydrALAZINE, insulin aspart, labetalol, ondansetron    OBJECTIVE:   Vital Signs (Most Recent):   Temp: 98.1 °F (36.7 °C) (02/23/17 0702)  Pulse: 78 (02/23/17 0802)  Resp: 20 (02/23/17 0802)  BP: (!) 170/97 (02/23/17 0926)  SpO2: 98 % (02/23/17 0802)    Vital Signs (24h Range):   Temp:  [97.9 °F (36.6 °C)-98.4 °F (36.9 °C)] 98.1 °F (36.7 °C)  Pulse:  [73-84] 78  Resp:  [12-33] 20  SpO2:  [94 %-100 %] 98 %  BP: (122-187)/() 170/97    ICP/CPP (Last 24h):        I & O (Last 24h):   Intake/Output Summary (Last 24 hours) at 02/23/17 1015  Last data filed at 02/23/17 0040   Gross per 24 hour   Intake              600 ml   Output             2600 ml   Net            -2000 ml     Physical Exam:  GA: Alert, comfortable, no acute distress.   HEENT: No scleral icterus or JVD.   Pulmonary: Clear to auscultation A/P/L. No wheezing, crackles, or rhonchi.  Cardiac: RRR S1 & S2 w/o rubs/murmurs/gallops.   Abdominal: Bowel sounds present x 4. No appreciable  hepatosplenomegaly.  Skin: No jaundice, rashes, or visible lesions.  Neuro:  --GCS: E4 V5 M6  --Mental Status: AAO x3 conversant, cooperative  --CN II-XII grossly intact.   --[id number], PERRL.   --Corneal reflex, gag, cough intact.  --LUE strength: 5/5  --RUE strength: 5/5  --L BKA able to lift  --RLE strength: 5/5    Vent Data:        Lines/Drains/Airway:              Hemodialysis AV Graft 01/22/11 1828 Right upper arm (Active)   Site Assessment No redness;No swelling 2/23/2017  3:00 AM   Patency Present;Thrill;Bruit 2/22/2017  9:41 PM   Status Accessed 2/22/2017  9:41 PM   Flows Good 2/22/2017  9:41 PM   Dressing Intervention New dressing 2/22/2017  9:41 PM   Dressing Status Clean;Dry;Intact 2/22/2017  9:41 PM   Site Condition No complications 2/22/2017  9:41 PM       Labs:  ABG: No results for input(s): PH, PO2, PCO2, HCO3, POCSATURATED, BE in the last 24 hours.  BMP:  Recent Labs  Lab 02/23/17  0136      K 3.8   CL 97   CO2 24   BUN 38*   CREATININE 4.8*   GLU 92   MG 1.7   PHOS 4.6*     LFT: Lab Results   Component Value Date    AST 25 02/23/2017    ALT 18 02/23/2017    ALKPHOS 155 (H) 02/23/2017    BILITOT 1.2 (H) 02/23/2017    ALBUMIN 3.1 (L) 02/23/2017    PROT 8.4 02/23/2017     CBC:   Lab Results   Component Value Date    WBC 4.49 02/23/2017    HGB 12.7 (L) 02/23/2017    HCT 36.1 (L) 02/23/2017    MCV 87 02/23/2017     (L) 02/23/2017     Microbiology x 7d:   Microbiology Results (last 7 days)     ** No results found for the last 168 hours. **        Imaging:  CT Head 2/22 1702:  Stable exam demonstrating small area of acute intraparenchymal hemorrhage within the left caudate head.  No new intracranial hemorrhage or major vascular territory infarct.    CT Head 2/22 1039  New small hemorrhage centered in the left caudate lobe head. No significant mass effect/edema.    Multiple remote bilateral basal ganglia, thalamic and brainstem hemorrhages in a distribution highly suggestive of chronic  hypertensive encephalopathy.    Chronic microvascular ischemic changes with remote lacunar type infarct within the right thalamus.    ASSESSMENT/PLAN:     Active Hospital Problems    Diagnosis    *Left-sided nontraumatic intracerebral hemorrhage    Nausea & vomiting      Neuro:   Small L caudate bleed  -NSGY consulted, recommend SBP <150, goal eunatremia, recommend MRI/MRA on 2/23.  -Q1H Neuro checks  -MRA without contrast 2/23 ordered    New Rectal Incontinence  -pt reports new diarrhea, able to feel passing bowels but not able to stop it.  -3 BM before this morning since admit.  -will check rectal tone  -MRI lumbar spine    Pulmonary:   -satting well on RA.    Cardiac:   Hypertension on admit  -SBP goal <160  -continue home amlodipine, lisinopril, coreg  -labetalol and hydralazine IV PRN  -BNP >4900.  -EKG with normal sinus rhythm at rate of 75, left axis deviation, incomplete RBBB, prolonged , QTc 506.  -Echo pending    Renal:   ESRD  -HD MWF/ AV fistula RICHARD  -Nephrology consulted for dialysis, recommend resuming cinacalcet and lanthanum.  -Strict I/O  -Cr improving with dialysis.     ID:   -afebrile   -No leukocytosis  -Monitor for S/S of infection     Hem/Onc:   -H/H WNL  -Plts WNL  -Pt/INR 1.3  -Follow CBC     Endocrine:  DMII  -Accuchecks ac& hs with SSI  -A1C pending  -TSH normal 0.572     Fluids/Electrolytes/Nutrition/GI:   Fluids: none  Diet: Diabetic/renal/dental soft diet.  SLP: See diet.  Nutrition: see diet  -replete electrolytes per Nephrology    Lines:  AV fistula R upper arm 1/22/2011    PPX:   PT/OT: consulted  DVT: hold chemical in setting of ICH, SCD/MEL ordered  Constipation: senna-doc  PUP: not indicated  VAP: not indicated    Dispo:  Patient stable, controlling BP.    Marco Rivera MD  PGY-1  Pager: [phone]  Spectra: [phone]

## 2017-02-23 NOTE — PLAN OF CARE
Problem: Physical Therapy Goal  Goal: Physical Therapy Goal  Goals to be met by: 3/4/17     Patient will increase functional independence with mobility by performin. Supine to sit with Contact Guard Assistance  2. Sit to supine with Contact Guard Assistance  3. Sit to stand transfer with Stand-by Assistance  4. Bed to chair transfer with Stand-by Assistance using Rolling Walker  5. Gait x 50 feet with Contact Guard Assistance using Rolling Walker.   6. Ascend/descend 5 stair with right Handrails Minimal Assistance.   7. Lower extremity exercise program x10-15 reps with supervision           PT evaluation completed. POC and goals established.     Damaris Humphreys, PT, DPT  2017

## 2017-02-23 NOTE — PLAN OF CARE
Problem: Patient Care Overview  Goal: Plan of Care Review  Outcome: Ongoing (interventions implemented as appropriate)  3hr bedside HD treatment completed 2L of fluid removed pt tolerated well. Both needles of a RICHARD fistula removed and pressure held until hemostasis achieved dressing applied. Primary nurse at bedside.

## 2017-02-23 NOTE — PROGRESS NOTES
Progress Note  Neurosurgery    Admit Date: 2/22/2017  Post-operative Day:    Hospital Day: 2    SUBJECTIVE:     Vaughn Retana is a 51 y.o. male with L basal ganglia ICH 9/16, anemia, DMII, Hepatitis C, HTN, acute on chronic diastolic CHF, L BKA who presents with L caudate and right basal ggl ICH.  Follow-up For:  * No surgery found *    NAEON    Scheduled Meds:   sodium chloride 0.9%  3 mL Intravenous Q8H     Continuous Infusions:   PRN Meds:acetaminophen, dextrose 50%, dextrose 50%, glucagon (human recombinant), glucose, glucose, hydrALAZINE, insulin aspart, labetalol, ondansetron    Review of patient's allergies indicates:  No Known Allergies    OBJECTIVE:     Vital Signs (Most Recent)  Temp: 98.4 °F (36.9 °C) (02/23/17 0300)  Pulse: 77 (02/23/17 0600)  Resp: (!) 26 (02/23/17 0600)  BP: 122/69 (02/23/17 0600)  SpO2: 97 % (02/23/17 0600)    Vital Signs Range (Last 24H):  Temp:  [97.9 °F (36.6 °C)-98.4 °F (36.9 °C)]   Pulse:  [73-84]   Resp:  [12-33]   BP: (122-187)/()   SpO2:  [94 %-100 %]     I & O (Last 24H):  Intake/Output Summary (Last 24 hours) at 02/23/17 0803  Last data filed at 02/23/17 0040   Gross per 24 hour   Intake              600 ml   Output             2600 ml   Net            -2000 ml     Physical Exam:  Awake, alert, oriented to person/place  PERRL, EOMI, face symm, tongue midline  FC x4  SILT  No drift    Lines/Drains:       Peripheral IV - Single Lumen 02/22/17 1211 Left Wrist (Active)   Site Assessment Clean;Dry;Intact;No redness;No swelling 2/23/2017  3:00 AM   Line Status Flushed;Saline locked 2/23/2017  3:00 AM   Dressing Status Clean;Dry;Intact 2/23/2017  3:00 AM   Dressing Intervention Dressing reinforced 2/23/2017  3:00 AM   Dressing Change Due 02/26/17 2/23/2017  3:00 AM   Site Change Due 02/26/17 2/22/2017  7:01 PM   Reason Not Rotated Not due 2/23/2017  3:00 AM   Number of days:0            Peripheral IV - Single Lumen 02/23/17 0030 Left Hand (Active)   Site Assessment  Clean;Dry;Intact;No redness;No swelling 2/23/2017  3:00 AM   Line Status Blood return noted;Flushed;Saline locked 2/23/2017  3:00 AM   Dressing Status Clean;Dry;Intact 2/23/2017  3:00 AM   Dressing Intervention Dressing reinforced 2/23/2017  3:00 AM   Dressing Change Due 02/27/17 2/23/2017  3:00 AM   Site Change Due 02/27/17 2/23/2017 12:30 AM   Reason Not Rotated Not due 2/23/2017  3:00 AM   Number of days:0            Hemodialysis AV Graft 01/22/11 1828 Right upper arm (Active)   Site Assessment No redness;No swelling 2/23/2017  3:00 AM   Patency Present;Thrill;Bruit 2/22/2017  9:41 PM   Status Accessed 2/22/2017  9:41 PM   Flows Good 2/22/2017  9:41 PM   Dressing Intervention New dressing 2/22/2017  9:41 PM   Dressing Status Clean;Dry;Intact 2/22/2017  9:41 PM   Site Condition No complications 2/22/2017  9:41 PM   Number of days:2223       Wound/Incision:  na    Laboratory:  CBC:   Recent Labs  Lab 02/23/17 0136   WBC 4.49   RBC 4.15*   HGB 12.7*   HCT 36.1*   *   MCV 87   MCH 30.6   MCHC 35.2     CMP:   Recent Labs  Lab 02/23/17 0136   GLU 92   CALCIUM 9.4   ALBUMIN 3.1*   PROT 8.4      K 3.8   CO2 24   CL 97   BUN 38*   CREATININE 4.8*   ALKPHOS 155*   ALT 18   AST 25   BILITOT 1.2*     Coagulation:   Recent Labs  Lab 02/23/17 0136   INR 1.3*     Cardiac markers:   Recent Labs  Lab 02/22/17  1304   CPKMB 1.8   TROPONINI 0.152*     No results for input(s): COLORU, CLARITYU, SPECGRAV, PHUR, PROTEINUA, GLUCOSEU, BILIRUBINCON, BLOODU, WBCU, RBCU, BACTERIA, MUCUS, NITRITE, LEUKOCYTESUR, UROBILINOGEN, HYALINECASTS in the last 168 hours.      Diagnostic Results:  CT head: stable from prior.    ASSESSMENT/PLAN:     Assessment: 51 M with pmhx of L basal ganglia ICH 9/16, anemia, DMII, Hepatitis C, HTN, acute on chronic diastolic CHF, L BKA who presents with left caudate ICH and right basal ggl ICH.  ICH of 0.  -Neuro stable  -SBP less than 150  -Goal eunatremia  -Rec MRI/MRA brain today  -ESRD: mgmt per  renal  -Medical mgmt per NCC

## 2017-02-24 DIAGNOSIS — K20.80 ESOPHAGITIS, LOS ANGELES GRADE D: ICD-10-CM

## 2017-02-24 DIAGNOSIS — D62 ACUTE POSTHEMORRHAGIC ANEMIA: Primary | ICD-10-CM

## 2017-02-24 PROBLEM — I61.9 LEFT-SIDED NONTRAUMATIC INTRACEREBRAL HEMORRHAGE: Status: ACTIVE | Noted: 2017-02-24

## 2017-02-24 LAB
ALBUMIN SERPL BCP-MCNC: 2.9 G/DL
ALP SERPL-CCNC: 141 U/L
ALT SERPL W/O P-5'-P-CCNC: 17 U/L
ANION GAP SERPL CALC-SCNC: 15 MMOL/L
AST SERPL-CCNC: 21 U/L
BASOPHILS # BLD AUTO: 0.01 K/UL
BASOPHILS NFR BLD: 0.2 %
BILIRUB SERPL-MCNC: 0.9 MG/DL
BUN SERPL-MCNC: 60 MG/DL
CALCIUM SERPL-MCNC: 9.4 MG/DL
CHLORIDE SERPL-SCNC: 95 MMOL/L
CO2 SERPL-SCNC: 27 MMOL/L
CREAT SERPL-MCNC: 7.2 MG/DL
DIFFERENTIAL METHOD: ABNORMAL
EOSINOPHIL # BLD AUTO: 0.1 K/UL
EOSINOPHIL NFR BLD: 1.8 %
ERYTHROCYTE [DISTWIDTH] IN BLOOD BY AUTOMATED COUNT: 16.9 %
EST. GFR  (AFRICAN AMERICAN): 9.2 ML/MIN/1.73 M^2
EST. GFR  (NON AFRICAN AMERICAN): 7.9 ML/MIN/1.73 M^2
GLUCOSE SERPL-MCNC: 194 MG/DL
HCT VFR BLD AUTO: 35.1 %
HEMOGLOBIN A1C REFERRAL TEST: NORMAL
HGB BLD-MCNC: 12.4 G/DL
HIV1+2 IGG SERPL QL IA.RAPID: NEGATIVE
LYMPHOCYTES # BLD AUTO: 0.9 K/UL
LYMPHOCYTES NFR BLD: 19.5 %
MAGNESIUM SERPL-MCNC: 2 MG/DL
MCH RBC QN AUTO: 30.5 PG
MCHC RBC AUTO-ENTMCNC: 35.3 %
MCV RBC AUTO: 86 FL
MONOCYTES # BLD AUTO: 0.7 K/UL
MONOCYTES NFR BLD: 14.6 %
NEUTROPHILS # BLD AUTO: 2.9 K/UL
NEUTROPHILS NFR BLD: 63.7 %
PHOSPHATE SERPL-MCNC: 6.3 MG/DL
PLATELET # BLD AUTO: 161 K/UL
PMV BLD AUTO: 11.2 FL
POCT GLUCOSE: 149 MG/DL (ref 70–110)
POCT GLUCOSE: 168 MG/DL (ref 70–110)
POCT GLUCOSE: 175 MG/DL (ref 70–110)
POCT GLUCOSE: 210 MG/DL (ref 70–110)
POCT GLUCOSE: 269 MG/DL (ref 70–110)
POTASSIUM SERPL-SCNC: 4.1 MMOL/L
PROT SERPL-MCNC: 7.9 G/DL
RBC # BLD AUTO: 4.07 M/UL
SODIUM SERPL-SCNC: 137 MMOL/L
WBC # BLD AUTO: 4.51 K/UL

## 2017-02-24 PROCEDURE — 84100 ASSAY OF PHOSPHORUS: CPT

## 2017-02-24 PROCEDURE — 92523 SPEECH SOUND LANG COMPREHEN: CPT

## 2017-02-24 PROCEDURE — 20000000 HC ICU ROOM

## 2017-02-24 PROCEDURE — 97110 THERAPEUTIC EXERCISES: CPT

## 2017-02-24 PROCEDURE — 25000003 PHARM REV CODE 250: Performed by: STUDENT IN AN ORGANIZED HEALTH CARE EDUCATION/TRAINING PROGRAM

## 2017-02-24 PROCEDURE — 97535 SELF CARE MNGMENT TRAINING: CPT

## 2017-02-24 PROCEDURE — 99233 SBSQ HOSP IP/OBS HIGH 50: CPT | Mod: GC,,, | Performed by: PSYCHIATRY & NEUROLOGY

## 2017-02-24 PROCEDURE — 83735 ASSAY OF MAGNESIUM: CPT

## 2017-02-24 PROCEDURE — 93005 ELECTROCARDIOGRAM TRACING: CPT

## 2017-02-24 PROCEDURE — 85025 COMPLETE CBC W/AUTO DIFF WBC: CPT

## 2017-02-24 PROCEDURE — 86701 HIV-1ANTIBODY: CPT

## 2017-02-24 PROCEDURE — 92526 ORAL FUNCTION THERAPY: CPT

## 2017-02-24 PROCEDURE — 99223 1ST HOSP IP/OBS HIGH 75: CPT | Mod: GC,,, | Performed by: INTERNAL MEDICINE

## 2017-02-24 PROCEDURE — 93010 ELECTROCARDIOGRAM REPORT: CPT | Mod: ,,, | Performed by: INTERNAL MEDICINE

## 2017-02-24 PROCEDURE — 99223 1ST HOSP IP/OBS HIGH 75: CPT | Mod: GC,,, | Performed by: PSYCHIATRY & NEUROLOGY

## 2017-02-24 PROCEDURE — 80053 COMPREHEN METABOLIC PANEL: CPT

## 2017-02-24 PROCEDURE — 99222 1ST HOSP IP/OBS MODERATE 55: CPT | Mod: ,,, | Performed by: INTERNAL MEDICINE

## 2017-02-24 PROCEDURE — 25000003 PHARM REV CODE 250: Performed by: PSYCHIATRY & NEUROLOGY

## 2017-02-24 PROCEDURE — 25000003 PHARM REV CODE 250: Performed by: NURSE PRACTITIONER

## 2017-02-24 PROCEDURE — C1751 CATH, INF, PER/CENT/MIDLINE: HCPCS

## 2017-02-24 PROCEDURE — 36620 INSERTION CATHETER ARTERY: CPT | Mod: ,,, | Performed by: PHYSICIAN ASSISTANT

## 2017-02-24 RX ORDER — SODIUM CHLORIDE 9 MG/ML
INJECTION, SOLUTION INTRAVENOUS ONCE
Status: COMPLETED | OUTPATIENT
Start: 2017-02-24 | End: 2017-02-25

## 2017-02-24 RX ORDER — SODIUM CHLORIDE 9 MG/ML
INJECTION, SOLUTION INTRAVENOUS CONTINUOUS
Status: DISCONTINUED | OUTPATIENT
Start: 2017-02-24 | End: 2017-02-24

## 2017-02-24 RX ORDER — PANTOPRAZOLE SODIUM 40 MG/1
40 TABLET, DELAYED RELEASE ORAL 2 TIMES DAILY
Status: DISCONTINUED | OUTPATIENT
Start: 2017-02-24 | End: 2017-02-25 | Stop reason: HOSPADM

## 2017-02-24 RX ORDER — SODIUM CHLORIDE 9 MG/ML
INJECTION, SOLUTION INTRAVENOUS
Status: DISCONTINUED | OUTPATIENT
Start: 2017-02-24 | End: 2017-02-25 | Stop reason: HOSPADM

## 2017-02-24 RX ADMIN — PANTOPRAZOLE SODIUM 40 MG: 40 TABLET, DELAYED RELEASE ORAL at 02:02

## 2017-02-24 RX ADMIN — PANTOPRAZOLE SODIUM 40 MG: 40 TABLET, DELAYED RELEASE ORAL at 09:02

## 2017-02-24 RX ADMIN — SODIUM CHLORIDE: 0.9 INJECTION, SOLUTION INTRAVENOUS at 09:02

## 2017-02-24 RX ADMIN — CARVEDILOL 12.5 MG: 12.5 TABLET, FILM COATED ORAL at 09:02

## 2017-02-24 RX ADMIN — Medication 3 ML: at 06:02

## 2017-02-24 RX ADMIN — CINACALCET HYDROCHLORIDE 60 MG: 60 TABLET, COATED ORAL at 09:02

## 2017-02-24 RX ADMIN — AMLODIPINE BESYLATE 10 MG: 10 TABLET ORAL at 06:02

## 2017-02-24 RX ADMIN — Medication 3 ML: at 09:02

## 2017-02-24 RX ADMIN — Medication 3 ML: at 02:02

## 2017-02-24 RX ADMIN — LISINOPRIL 40 MG: 20 TABLET ORAL at 09:02

## 2017-02-24 RX ADMIN — INSULIN ASPART 2 UNITS: 100 INJECTION, SOLUTION INTRAVENOUS; SUBCUTANEOUS at 05:02

## 2017-02-24 RX ADMIN — INSULIN ASPART 2 UNITS: 100 INJECTION, SOLUTION INTRAVENOUS; SUBCUTANEOUS at 09:02

## 2017-02-24 RX ADMIN — LANTHANUM CARBONATE 1000 MG: 500 TABLET, CHEWABLE ORAL at 09:02

## 2017-02-24 RX ADMIN — INSULIN ASPART 6 UNITS: 100 INJECTION, SOLUTION INTRAVENOUS; SUBCUTANEOUS at 10:02

## 2017-02-24 RX ADMIN — SODIUM CHLORIDE 250 ML: 0.9 INJECTION, SOLUTION INTRAVENOUS at 01:02

## 2017-02-24 RX ADMIN — LANTHANUM CARBONATE 1000 MG: 500 TABLET, CHEWABLE ORAL at 03:02

## 2017-02-24 NOTE — PROGRESS NOTES
ICU Progress Note  Neurocritical Care    Admit Date: 2/22/2017  LOS: 2    CC: Left-sided nontraumatic intracerebral hemorrhage  Code Status: Full Code     SUBJECTIVE:     HPI:  51-year-old black male with history of ESRD, dialysis MWF, previous L basal ganglia ICH 9/16, anemia,DMII, Hepatitis C, HTN, acute on chronic diastolic CHF, L BKA He presents today with 2 day history of nausea and vomiting    Interval History/Significant Events: NAEON, VSS.  Patient's nausea/vomiting resolved with scheduled IV reglan.  PRN zofran and phenergan.    Review of Symptoms:   Constitutional: Denies fevers or chills.  Pulmonary: Denies shortness of breath or cough.  Cardiology: Denies chest pain or palpitations.  GI: Denies abdominal pain or constipation.  Neurologic: Denies new weakness, headaches, or paresthesias.     Medications:  Continuous Infusions:   Scheduled Meds:   amlodipine  10 mg Oral QAM    carvedilol  12.5 mg Oral BID    cinacalcet  60 mg Oral Daily with breakfast    lanthanum  1,000 mg Oral TID WM    lisinopril  40 mg Oral Daily    sodium chloride 0.9%  3 mL Intravenous Q8H     PRN Meds:.acetaminophen, dextrose 50%, dextrose 50%, glucagon (human recombinant), glucose, glucose, hydrALAZINE, insulin aspart, labetalol, ondansetron, pneumoc 13-jefferson conj-dip cr(PF), promethazine (PHENERGAN) IVPB, senna-docusate 8.6-50 mg    OBJECTIVE:   Vital Signs (Most Recent):   Temp: 98.6 °F (37 °C) (02/24/17 0702)  Pulse: 75 (02/24/17 0702)  Resp: 19 (02/24/17 0702)  BP: 126/76 (02/24/17 0702)  SpO2: 97 % (02/24/17 0702)    Vital Signs (24h Range):   Temp:  [98.4 °F (36.9 °C)-99.5 °F (37.5 °C)] 98.6 °F (37 °C)  Pulse:  [74-99] 75  Resp:  [8-42] 19  SpO2:  [95 %-100 %] 97 %  BP: (109-178)/() 126/76    ICP/CPP (Last 24h):        I & O (Last 24h):     Intake/Output Summary (Last 24 hours) at 02/24/17 0835  Last data filed at 02/24/17 0600   Gross per 24 hour   Intake              770 ml   Output              800 ml   Net               -30 ml     Physical Exam:  GA: Alert, comfortable, no acute distress.   HEENT: No scleral icterus or JVD.   Pulmonary: Clear to auscultation A/P/L. No wheezing, crackles, or rhonchi.  Cardiac: RRR S1 & S2 w/o rubs/murmurs/gallops.   Abdominal: Bowel sounds present x 4. No appreciable hepatosplenomegaly.  Skin: No jaundice, rashes, or visible lesions.  Neuro:  --GCS: E4 V5 M6  --Mental Status: AAO x3 conversant, cooperative  --CN II-XII grossly intact.   --[id number], PERRL.   --Corneal reflex, gag, cough intact.  --LUE strength: 5/5  --RUE strength: 5/5  --L BKA able to lift  --RLE strength: 5/5    Vent Data:        Lines/Drains/Airway:              Hemodialysis AV Graft 01/22/11 1828 Right upper arm (Active)   Site Assessment No redness;No swelling 2/23/2017  3:00 AM   Patency Present;Thrill;Bruit 2/22/2017  9:41 PM   Status Accessed 2/22/2017  9:41 PM   Flows Good 2/22/2017  9:41 PM   Dressing Intervention New dressing 2/22/2017  9:41 PM   Dressing Status Clean;Dry;Intact 2/22/2017  9:41 PM   Site Condition No complications 2/22/2017  9:41 PM       Labs:  ABG: No results for input(s): PH, PO2, PCO2, HCO3, POCSATURATED, BE in the last 24 hours.  BMP:    Recent Labs  Lab 02/24/17  0300      K 4.1   CL 95   CO2 27   BUN 60*   CREATININE 7.2*   *   MG 2.0   PHOS 6.3*     LFT:   Lab Results   Component Value Date    AST 21 02/24/2017    ALT 17 02/24/2017    ALKPHOS 141 (H) 02/24/2017    BILITOT 0.9 02/24/2017    ALBUMIN 2.9 (L) 02/24/2017    PROT 7.9 02/24/2017     CBC:   Lab Results   Component Value Date    WBC 4.51 02/24/2017    HGB 12.4 (L) 02/24/2017    HCT 35.1 (L) 02/24/2017    MCV 86 02/24/2017     02/24/2017     Microbiology x 7d:   Microbiology Results (last 7 days)     ** No results found for the last 168 hours. **        Imaging:  CT Head 2/22 1702:  Stable exam demonstrating small area of acute intraparenchymal hemorrhage within the left caudate head.  No new intracranial  hemorrhage or major vascular territory infarct.    CT Head 2/22 1039  New small hemorrhage centered in the left caudate lobe head. No significant mass effect/edema.    Multiple remote bilateral basal ganglia, thalamic and brainstem hemorrhages in a distribution highly suggestive of chronic hypertensive encephalopathy.    Chronic microvascular ischemic changes with remote lacunar type infarct within the right thalamus.    ASSESSMENT/PLAN:     Active Hospital Problems    Diagnosis    *Left-sided nontraumatic intracerebral hemorrhage    Nausea & vomiting      Neuro:   Small L caudate bleed  -NSGY consulted, recommend SBP <150, goal eunatremia, recommend MRI/MRA on 2/23.  -Q1H Neuro checks  -MRI/MRA without contrast 2/23: no significant stenosis, occlusion, AV malformation, or aneurysm.  Acute/subacute L caudate hemorrhage.    New Rectal Incontinence  -pt reported new diarrhea, able to feel passing bowels but not able to stop it.  -rectal tone good  -MRI lumbar spine degenerative changes, renal anemia in marrow signal.  No explanation for rectal incontinence.  -no diarrhea since 2/23  -previous hx of cdiff, sent for cdiff.    Pulmonary:   -satting well on RA.    Cardiac:   Hypertension on admit  -SBP goal <160  -continue home amlodipine, lisinopril, coreg  -labetalol and hydralazine IV PRN   -BNP >4900.  -EKG with normal sinus rhythm at rate of 75, left axis deviation, incomplete RBBB, prolonged , QTc 506.  -Echo EF 50%, severe tricuspid valve regurgitation.  -consult cardiology.  -place arterial line and place on flotrac    Renal:    ESRD  -HD MWF/ AV fistula RICHARD  -Nephrology consulted for dialysis, recommend resuming cinacalcet and lanthanum.  -Strict I/O  -Cr improving with dialysis.     ID:   -afebrile   -No leukocytosis  -Monitor for S/S of infection     Hem/Onc:   -H/H WNL  -Plts WNL  -Pt/INR 1.3  -Follow CBC     Endocrine:  DMII  -Accuchecks ac& hs with SSI  -A1C pending  -TSH normal  0.572     Fluids/Electrolytes/Nutrition/GI:   Nausea/Vomiting  -stopped with IV reglan  -gastric contents positive for heme.  -on protonix BID.  -consult GI.    Fluids: none  Diet: Diabetic/renal/dental soft diet.  SLP: See diet.  Nutrition: see diet  -replete electrolytes per Nephrology    Lines:  AV fistula R upper arm 1/22/2011  Arterial line L arm 2/23/2017    PPX:   PT/OT: consulted  DVT: hold chemical in setting of ICH, SCD/MEL ordered  Constipation: senna-doc  PUP: not indicated  VAP: not indicated    Dispo:  Patient stable, controlling BP.  Investigating severe tricuspid regurgitation. Investigating hemoccult positive gastric contents, Hgb stable.  Expecting patient ready for stepdown to vascular neurology.    Marco Rivera MD  PGY-1  Pager: [phone]  Spectra: [phone]

## 2017-02-24 NOTE — PT/OT/SLP PROGRESS
Occupational Therapy  Treatment    Vaughn Retana   MRN: [id number]   Admitting Diagnosis: Left-sided nontraumatic intracerebral hemorrhage    OT Date of Treatment: 17   OT Start Time: 826  OT Stop Time: 854  OT Total Time (min): 28 min    Billable Minutes:  Self Care/Home Management 18 and Therapeutic Exercise 10    General Precautions: Standard, aspiration, fall    Do you have any cultural, spiritual, Anglican conflicts, given your current situation?: Hindu    Subjective:  Communicated with RN prior to session.  Pt reported that he wanted to go to rehab.    Pain Ratin/10  Pain Rating Post-Intervention: 0/10    Objective:  Patient found with: blood pressure cuff, pulse ox (continuous), telemetry     Functional Mobility:  Bed Mobility:  Supine to Sit: Stand by Assistance    Transfers:   Sit <> Stand Assistance: Stand By Assistance (from EOB)  Sit <> Stand Assistive Device: No Assistive Device  Bed <> Chair Technique: Squat Pivot  Bed <> Chair Transfer Assistance: Stand By Assistance  Bed <> Chair Assistive Device: No Assistive Device    Activities of Daily Living:     UE Dressing Level of Assistance: Minimum assistance (seated EOB)  LE Dressing Level of Assistance: Minimum assistance (seated EOB donning socks)    Therapeutic Activities and Exercises:  Pt performed AROM exercises with BUE in all planes x 2 sets x 10 reps each while seated in chair.  Provided education regarding rehab vs HH to pt with pt verbalizing understanding.  Pt required SBA-Mod (A) with postural control while seated EOB. Pt had no further questions & when asked whether there were any concerns pt reported none.      AM-PAC 6 CLICK ADL   How much help from another person does this patient currently need?   1 = Unable, Total/Dependent Assistance  2 = A lot, Maximum/Moderate Assistance  3 = A little, Minimum/Contact Guard/Supervision  4 = None, Modified [place]/Independent    Putting on and taking off regular lower body  clothing? : 3  Bathing (including washing, rinsing, drying)?: 2  Toileting, which includes using toilet, bedpan, or urinal? : 2  Putting on and taking off regular upper body clothing?: 3  Taking care of personal grooming such as brushing teeth?: 3  Eating meals?: 3  Total Score: 16     AM-PAC Raw Score CMS G-Code Modifier Level of Impairment Assistance   6 % Total / Unable   7 - 9 CM 80 - 100% Maximal Assist   10 - 14 CL 60 - 80% Moderate Assist   15 - 19 CK 40 - 60% Moderate Assist   20 - 22 CJ 20 - 40% Minimal Assist   23 CI 1-20% SBA / CGA   24 CH 0% Independent/ Mod I     Patient left up in chair with all lines intact, call button in reach, RN notified and white board updated    ASSESSMENT:  Vaughn Retana is a 51 y.o. male with a medical diagnosis of Left-sided nontraumatic intracerebral hemorrhage and presents with good participation and motivation.  Pt with improvements in ADL's & transfers.  Pt continues to require (A) with all activities.    Rehab identified problem list/impairments: Rehab identified problem list/impairments: weakness, impaired endurance, impaired self care skills, impaired balance, impaired functional mobilty, decreased upper extremity function, decreased lower extremity function    Rehab potential is good.    Activity tolerance: Good    Discharge recommendations: Discharge Facility/Level Of Care Needs: home health OT (potentially if can manage steps as pt lives on 2nd floor otherwise rehab)     GOALS:   Occupational Therapy Goals        Problem: Occupational Therapy Goal    Goal Priority Disciplines Outcome Interventions   Occupational Therapy Goal     OT, PT/OT Ongoing (interventions implemented as appropriate)    Description:  Goals to be met by: 3/2     Patient will increase functional independence with ADLs by performing:    UE Dressing with Stand-by Assistance.  LE Dressing with Stand-by Assistance.  Grooming while standing with Stand-by Assistance.  Toileting from bedside  commode with Stand-by Assistance for hygiene and clothing management.   Rolling to Bilateral with Modified [place].   Supine to sit with Modified [place].  Stand pivot transfers with Supervision.                 Plan:  Patient to be seen 6 x/week to address the above listed problems via self-care/home management, neuromuscular re-education, cognitive retraining, sensory integration, therapeutic activities, therapeutic exercises  Plan of Care expires: 03/25/17  Plan of Care reviewed with: patient         WILBUR Mitchell  02/24/2017

## 2017-02-24 NOTE — PLAN OF CARE
SW met with Pt at bedside. Advised of recs for rehab. He will review the list and let SW know of choices.    Kamini Ramírez LMSW  Neurocritical Care   [facility]  [phone]

## 2017-02-24 NOTE — MEDICAL/APP STUDENT
Gastroenterology Consult    Reason for Consult: Rule out upper GI bleed    HPI: Mr SUAREZ is a 51-yo  male who presented with a 2/7 hx of nausea and vomiting on a significant b/g of L) basal ganglia ICH (09/16) 2/2 poorly managed HTN, ESRD (dialysis MWF), Hep C positive, diastolic CHF and L) BKA. Mr SUAREZ explained that his vomiting began 2/7 ago with 2-3 episodes/day. Denies seeing camelia blood or coffee-grounds in vomit. He denied abdominal or chest discomfort. Concomitant diarrhoea has also been present for 2/7 with >5 loose stools/day and at times difficulty maintaining faecal continence. Denied blood in stools or blackened stools. ND exam performed which found intact anal tone and no dark stools on gloved finger.     Mr SUAREZ explained he has a past history of PUD ~5 years ago which hasn't been documented. He states it was treated with antibiotics. His risk factors for an UGIB would be his recent episode of vomiting leading to dry retching/ renzo juan tears, past history of PUD or ESRD associated propensity to bleed. He denied smoking, drinking alcohol or taking NSAIDs. He does admit that he is non-compliant with his blood pressure medications and tends to take them only once every three days.         Constitutional: no fever, chills or change in weight   Eyes: no visual changes   ENT: no sore throat or dysphagia  Respiratory: no cough or shortness of breath   Cardiovascular: no chest pain or palpitations   Gastrointestinal: as per HPI  Hematologic/Lymphatic: no easy bruising or lymphadenopathy   Musculoskeletal: no arthralgias or myalgias   Neurological: no seizures, tremors or change in mental status  Behavioral/Psych: no auditory or visual hallucinations    Past Medical History:   Diagnosis Date    Acute on chronic diastolic CHF (congestive heart failure) 9/14/2016    Anemia     Chronic low back pain 12/1/2015    Diabetes mellitus, type II     Diabetic neuropathy     ESRD on hemodialysis      "Hepatitis C     states hepatitis B    Hypertension     Left basal ganglia ICH 5/6/2013    Metabolic acidosis, IAG, reduced excretion of inorganic acids     Myoclonic jerking 9/20/2016    Obesity     Secondary hyperparathyroidism (of renal origin)     TB lung, latent 8/25/2015    Thyroid disease        Past Surgical History:   Procedure Laterality Date    COLONOSCOPY      FOOT AMPUTATION THROUGH METATARSAL      left foot    LEG AMPUTATION THROUGH KNEE  12/18/2013    left BKA    R AVF  9/12/12       Family History   Problem Relation Age of Onset    Early death Mother     Kidney disease Father     Hypertension Father     Heart disease Father     Hyperlipidemia Father     Diabetes Brother     Alcohol abuse Maternal Grandmother     Diabetes Maternal Grandfather     Melanoma Neg Hx        Review of patient's allergies indicates:  No Known Allergies    Social History     Social History    Marital status:      Spouse name: N/A    Number of children: N/A    Years of education: N/A     Social History Main Topics    Smoking status: Former Smoker     Packs/day: 1.00     Years: 10.00    Smokeless tobacco: Never Used    Alcohol use No    Drug use: No    Sexual activity: Not Asked     Other Topics Concern    None     Social History Narrative        sodium chloride 0.9%   Intravenous Once    amlodipine  10 mg Oral QAM    carvedilol  12.5 mg Oral BID    cinacalcet  60 mg Oral Daily with breakfast    lanthanum  1,000 mg Oral TID WM    lisinopril  40 mg Oral Daily    pantoprazole  40 mg Oral BID    sodium chloride 0.9%  3 mL Intravenous Q8H       BP 99/61 (BP Location: Left arm, Patient Position: Sitting, BP Method: Automatic)  Pulse 66  Temp 97.7 °F (36.5 °C) (Oral)   Resp 12  Ht 5' 6" (1.676 m)  Wt 71.8 kg (158 lb 4.6 oz)  SpO2 99%  BMI 25.55 kg/m2  General appearance: alert, appears stated age and cooperative.  Eyes: negative findings: conjunctivae and sclerae normal.  Throat: " lips, mucosa, and tongue normal.  Lungs: clear to auscultation bilaterally.  Heart: S1, S2 normal.  Abdomen: soft, non-tender; bowel sounds normal; no masses, no organomegaly, ascites appreciated.   Skin: spider angiomata, no palmar erythema, no jaundice.  Lymph nodes: No cervical or supraclavicular adenopathy appreciated  Neurologic: Mental status: Alert, oriented, thought content appropriate, no asterixis.  Extremities: no lower extremity edema, no muscle wasting appreciated.      Laboratory:    Recent Labs  Lab 02/22/17  1017 02/23/17  0136 02/23/17  1412 02/24/17  0300   * 136  --  137   K 5.2* 3.8  --  4.1   CL 93* 97  --  95   CO2 22* 24  --  27   BUN 68* 38*  --  60*   CREATININE 7.2* 4.8*  --  7.2*   CALCIUM 10.2 9.4  --  9.4   PROT 9.5* 8.4  --  7.9   BILITOT 1.5* 1.2*  --  0.9   ALKPHOS 176* 155*  --  141*   ALT 21 18 19 17   AST 28 25 26 21         Recent Labs  Lab 02/22/17  1017 02/23/17  0136 02/24/17  0300   WBC 5.59 4.49 4.51   HGB 14.6 12.7* 12.4*   HCT 41.0 36.1* 35.1*    142* 161         Recent Labs  Lab 02/23/17  0136   INR 1.3*       Assessment:    Plan:    ROLDAN Herndon IV  Medical Student  Gastroenterology Service

## 2017-02-24 NOTE — CONSULTS
Gastroenterology   Consult note      SUBJECTIVE:     Reason for Consult:  hematemesis yesterday, no abd pain    History of Present Illness:  Patient is a 51 y.o. male with history of ESRD, dialysis MWF, previous L basal ganglia ICH 9/16, anemia,DMII, Hepatitis C, HTN, acute on chronic diastolic CHF, L BKA He presents today with 2 day history of nausea and vomiting on 2/22/17.     Patient also stated he has liquid stool leaking constantly from his rectum for the last 2 days as well. ED physician  not sure how accurate the history was from the patient. Patient has had 2 subarachnoid hemorrhages in the past and there is no one with him at this time. Patient denies headache, sore throat, change in vision, or difficulty in swallowing. Patient denies chest pain, shortness of breath, abdominal pain, neurological changes, or peripheral edema. Patient states he rarely makes urine.   CT head revealed small L caudate bleed. Patientadmited to NICU to monitor closely for neurological changes or worsening of Bleed    Patients nausea and vomiting have resolved today, he has not vomited since yesterday per nursing, but when he did vomit yesterday the nursing staff felt it was bilious and brown also, but the sample was sent for occult blood but was positive.  Patient never had bright red blood in the vomit.  His stools were liquid and brown per nursing.  His hemoglobin as actually slightly above baseline.  He denies abdominal pain or NSAID use, he had an EGD done in 2014 for hematemesis, nausea, vomiting and it showed LA Grade D esophagitis. Nodular mucosa in the esophagus. Biopsied. Congested, erythematous and granular mucosa in the gastric fundus. Biopsied.A single gastric polyp. Biopsied. A single gastric polyp. Biopsied. Erythematous duodenopathy. Biopsies were taken with a cold forceps for Helicobacter pylori testing, which was negative.    PTA Medications   Medication Sig    amlodipine (NORVASC) 10 MG tablet Take 1 tablet (10  mg total) by mouth every morning.    carvedilol (COREG) 12.5 MG tablet Take 1 tablet (12.5 mg total) by mouth 2 (two) times daily.    cinacalcet (SENSIPAR) 60 MG Tab Take 1 tablet (60 mg total) by mouth daily with breakfast.    docusate sodium (COLACE) 100 MG capsule Take 1 capsule (100 mg total) by mouth 2 (two) times daily.    duloxetine (CYMBALTA) 30 MG capsule Take 2 capsules (60 mg total) by mouth once daily.    gabapentin (NEURONTIN) 100 MG capsule Take 1 capsule (100 mg total) by mouth 3 (three) times daily.    gabapentin (NEURONTIN) 600 MG tablet Take 1 tablet (600 mg total) by mouth 3 (three) times daily.    hydrocodone-acetaminophen 5-325mg (NORCO) 5-325 mg per tablet Take 1 tablet by mouth 2 (two) times daily as needed.    ketoconazole (NIZORAL) 2 % shampoo Wash hair and affected areas of skin with medicated shampoo at least 2x/week - let sit on skin at least 5 minutes prior to rinsing    lanthanum (FOSRENOL) 1000 MG chewable tablet Take 1 tablet (1,000 mg total) by mouth 3 (three) times daily with meals.    lisinopril (PRINIVIL,ZESTRIL) 20 MG tablet Take 2 tablets (40 mg total) by mouth once daily.    pantoprazole (PROTONIX) 40 MG tablet Take 1 tablet (40 mg total) by mouth once daily.    sodium chloride (OCEAN) 0.65 % nasal spray 1 spray by Nasal route 2 (two) times daily.    tramadol (ULTRAM) 50 mg tablet Take 1 tablet (50 mg total) by mouth 3 (three) times daily as needed for Pain.       Review of patient's allergies indicates:  No Known Allergies     Past Medical History:   Diagnosis Date    Acute on chronic diastolic CHF (congestive heart failure) 9/14/2016    Anemia     Chronic low back pain 12/1/2015    Diabetes mellitus, type II     Diabetic neuropathy     ESRD on hemodialysis     Hepatitis C     states hepatitis B    Hypertension     Left basal ganglia ICH 5/6/2013    Metabolic acidosis, IAG, reduced excretion of inorganic acids     Myoclonic jerking 9/20/2016    Obesity      Secondary hyperparathyroidism (of renal origin)     TB lung, latent 8/25/2015    Thyroid disease      Past Surgical History:   Procedure Laterality Date    COLONOSCOPY      FOOT AMPUTATION THROUGH METATARSAL      left foot    LEG AMPUTATION THROUGH KNEE  12/18/2013    left BKA    R AVF  9/12/12     Family History   Problem Relation Age of Onset    Early death Mother     Kidney disease Father     Hypertension Father     Heart disease Father     Hyperlipidemia Father     Diabetes Brother     Alcohol abuse Maternal Grandmother     Diabetes Maternal Grandfather     Melanoma Neg Hx      Social History   Substance Use Topics    Smoking status: Former Smoker     Packs/day: 1.00     Years: 10.00    Smokeless tobacco: Never Used    Alcohol use No       Review of Systems:  Constitutional: no fever or chills  Respiratory: no cough or shorness of breath  Cardiovascular: no chest pain or palpitations  Gastrointestinal: improved nausea and vomiting, no abdominal pain, + diarrhea   Integument/Breast: no rash or pruritis  Hematologic/Lymphatic: no easy bruising   Musculoskeletal: no arthralgias or myalgias  Neurological: no seizures or tremors  Endocrine: no heat or cold intolerance    OBJECTIVE:     Vital Signs (Most Recent)  Temp: 97.7 °F (36.5 °C) (02/24/17 1102)  Pulse: 69 (02/24/17 1102)  Resp: 20 (02/24/17 1102)  BP: 100/63 (02/24/17 1102)  SpO2: 99 % (02/24/17 1102)    Physical Exam:  General appearance: well developed, well nourished  Throat: lips, mucosa, and tongue normal  Neck: supple, symmetrical, trachea midline, no JVD   Lungs:  clear to auscultation bilaterally and normal respiratory effort  Heart: regular rate and rhythm, S1, S2 normal, no murmu  Abdomen: soft, non-tender, non-distended;BS positive x 4 quadrants; no masses,  no organomegaly, no rebound tenderness or guarding  Extremities: no cyanosis   Pulses: 2+ and symmetric  Skin: Skin color, texture, turgor normal.   Neurologic: alert and  oriented x 3     Laboratory    CBC:   Recent Labs  Lab 02/22/17  1017 02/23/17  0136 02/24/17  0300   WBC 5.59 4.49 4.51   RBC 4.73 4.15* 4.07*   HGB 14.6 12.7* 12.4*   HCT 41.0 36.1* 35.1*    142* 161   MCV 87 87 86   MCH 30.9 30.6 30.5   MCHC 35.6 35.2 35.3     BMP:   Recent Labs  Lab 02/22/17  1017  02/23/17  0136 02/24/17  0300   *  --  92 194*   *  --  136 137   K 5.2*  --  3.8 4.1   CL 93*  --  97 95   CO2 22*  --  24 27   BUN 68*  --  38* 60*   CREATININE 7.2*  --  4.8* 7.2*   CALCIUM 10.2  --  9.4 9.4   MG  --   < > 1.7 2.0   < > = values in this interval not displayed.  Coagulation:   Recent Labs  Lab 02/23/17  0136   INR 1.3*     Microbiology Results (last 7 days)     Procedure Component Value Units Date/Time    Clostridium difficile EIA [[id number]]     Order Status:  Completed Specimen:  Stool from Stool           Diagnostic Results:  Reviewed     ASSESSMENT/PLAN:     Active Hospital Problems    Diagnosis  POA    *Left-sided nontraumatic intracerebral hemorrhage [I61.9]  Unknown    Nausea & vomiting [R11.2]  Yes      Resolved Hospital Problems    Diagnosis Date Resolved POA   No resolved problems to display.     51 y.o. male with history of ESRD, dialysis MWF, previous L basal ganglia ICH 9/16, anemia,DMII, Hepatitis C, HTN, acute on chronic diastolic CHF, L BKA He presents today with 2 day history of nausea and vomiting on 2/22/17.     - no overt signs of GI bleeding, hemoglobin slightly up from baseline, previous EGD as noted above.  Brown stool on rectal exam.    Recommendations:   - patient does not seem to have active GI bleeding given his overall clinical context, will hold off on endoscopy currently, he could have repeat EGD as an outpatient given recommendations from EGD in 2014, continue PPI once daily oral dosing given grade D esophagitis noted in the past  - check stool for C.diff, given diarrhea, and C.diff noted in the past     - please call with any questions or  concerns, will sign off       Galileo Owen  Gastroenterology Fellow (PGY 5)

## 2017-02-24 NOTE — PLAN OF CARE
Problem: Occupational Therapy Goal  Goal: Occupational Therapy Goal  Goals to be met by: 3/2     Patient will increase functional independence with ADLs by performing:    UE Dressing with Stand-by Assistance.  LE Dressing with Stand-by Assistance.  Grooming while standing with Stand-by Assistance.  Toileting from bedside commode with Stand-by Assistance for hygiene and clothing management.   Rolling to Bilateral with Modified [place].   Supine to sit with Modified [place].  Stand pivot transfers with Supervision.   Outcome: Ongoing (interventions implemented as appropriate)  Goals updated

## 2017-02-24 NOTE — PROGRESS NOTES
Progress Note  Nephrology    Admit Date: 2/22/2017   LOS: 2 days     SUBJECTIVE:     Follow-up For: ESRD     Interval follow up: NAEON. Last HD 2/22. No complaints at the time of evaluation. With adequate BP levels at the time of evaluation.     OBJECTIVE:     Vital Signs (Most Recent)  Temp: 98.6 °F (37 °C) (02/24/17 0702)  Pulse: 74 (02/24/17 0947)  Resp: 13 (02/24/17 0902)  BP: (!) 155/85 (02/24/17 0947)  SpO2: 98 % (02/24/17 0902)    Vital Signs Range (Last 24H):  Temp:  [98.4 °F (36.9 °C)-99.5 °F (37.5 °C)]   Pulse:  [74-99]   Resp:  [8-42]   BP: (109-178)/()   SpO2:  [96 %-100 %]        Physical Exam:   General: AAM in NAD. AOx4.  Neck: Supple, symmetrical, trachea midline, no thyromegaly, no JVD  Respiratory: Clear to auscultation bilaterally, respirations unlabored, no rales/rhonchi/wheezing  Cardiovacular: Regular rate and rhythm, S1, S2 normal, no murmurs, rubs or gallops  Gastrointestinal: Soft, non-tender, bowel sounds normal, no hepatosplenomegaly  Extremities: No clubbing or cyanosis of bilateral upper extremities; L BKA. No lower extremity edema  Skin: warm and dry; no rash on exposed skin    Laboratory:  CBC:     Recent Labs  Lab 02/24/17  0300   WBC 4.51   RBC 4.07*   HGB 12.4*   HCT 35.1*      MCV 86   MCH 30.5   MCHC 35.3     CMP:     Recent Labs  Lab 02/24/17  0300   *   CALCIUM 9.4   ALBUMIN 2.9*   PROT 7.9      K 4.1   CO2 27   CL 95   BUN 60*   CREATININE 7.2*   ALKPHOS 141*   ALT 17   AST 21   BILITOT 0.9       Diagnostic Results:  Labs: Reviewed  X-Ray: Reviewed    ASSESSMENT/PLAN:     Active Hospital Problems    Diagnosis  POA    *Left-sided nontraumatic intracerebral hemorrhage [I61.9]  Unknown    Nausea & vomiting [R11.2]  Yes      Resolved Hospital Problems    Diagnosis Date Resolved POA   No resolved problems to display.     52 yo AAM with medical history consiting of ESRD on dialysis MWF, previous L basal ganglia ICH 9/16, anemia,DMII, Hepatitis C, HTN,  acute on chronic diastolic CHF, L BKA He presents today with 2 day history of nausea and vomiting. Typically dialyzes MWF at [facility]. Access is AVF.    ESRD on IHD MWF  - Will provide dialysis for metabolic clearance and volume   - Target ultrafiltration up to 1-2L as tolerated by patient   - Dialysate adjusted to current labs   - Discontinue IV fluids     Anemia of Chronic Kidney Disease   - Hgb currently at goal for ESRD  - H/H: 12.4/ 35.1    Mineral Bone Disease in CKD   - Already on renal diet   - Already on Sensipar and Lanthanum (binder) as taken as outpatient   - With hyperphosphatemia. Will continue monitoring in case adjustment on binders needed    Sebas Catalan   Nephrology fellow PGY- 5  [phone]      Patient seen and examined with Dr Catalan;   I have reviewed and agree with assessment and plan

## 2017-02-24 NOTE — PLAN OF CARE
Problem: SLP Goal  Goal: SLP Goal  SLP Plan of Care: Speech Pathology will follow pt daily M-F and address the following goals x1 week:  3/2  1. Pt will tolerate dental soft diet and thin liquids without s/s of airway compromise. MET  2. Pt will perform oral strengthening exercises X10 per session provided min cues to improve safe swallow function. ONGOING  3. Pt will tolerate trials of regular solids with adequate oral clearance. MET  4. If MD agrees, pt will participate in speech language cognitive eval to determine need for intervention. MET    Speech Pathology will follow pt daily M-F and address the following goals x5 week:  3/9  1. Pt will perform oral strengthening exercises X10 per session provided min cues to improve safe swallow function.   2. Pt will tolerate regular diet and thin liquids without s/s of airway compromise.   3. Pt will complete simple sequencing task with 80% acc provided min cues to improve cognition.  4. Pt will complete simple mental manipulation task with 80% acc provided min cues to improve cognitive functioning.   5. Pt will complete delayed memory task with 80% acc provided min cues to improve cognition   6. Pt will participate in reading, writing, v.s. and following commands to determine need for intervention.   7. Pt will complete divergent naming task with 80% acc provided min cues to improve expressive language.      Speech language cognitive eval completed. Updated POC.      Cathy London M.A. CCC-SLP  Speech Language Pathologist  [phone]  2/24/2017

## 2017-02-24 NOTE — CONSULTS
Cardiology History & Physical  Attending Physician: Gibson Marrero MD  Reason for Consult: Severe Tricuspid regurgitation      HPI:   51 y.o. male with history of ESRD ( HD MWF), previous L basal ganglia ICH 9/16, DMII, Hepatitis C, HTN, and severe tricuspid valve regurgitation admitted for left basal gaanglia hemorrhage and malignant HTN.  Is s/p HD.  Patient reports he is doing ok.  Currently reports no cardia history that he is aware of.  No previous MIs, Aifb or Stents.  Per chart review patient has had pulmonary HTN (unknown etiology ).  Prior to admission patients reports he was able walk long distances without becoming SOB and denies lower extremity edema,.  Currently denies chest pain, SOB at rest or during exertion palpitations, or lower extremity edema.     ROS:    Constitution: Negative for fever, chills, weight loss or gain.   HENT: Negative for sore throat, rhinorrhea, or headache.  Eyes: Negative for blurred or double vision.   Cardiovascular: See above  Pulmonary: Negative for SOB   Gastrointestinal: Negative for abdominal pain, nausea, vomiting, or diarrhea.   : Negative for dysuria.   Neurological: Negative for focal weakness or sensory changes.  PMH:     Past Medical History:   Diagnosis Date    Acute on chronic diastolic CHF (congestive heart failure) 9/14/2016    Anemia     Chronic low back pain 12/1/2015    Diabetes mellitus, type II     Diabetic neuropathy     ESRD on hemodialysis     Hepatitis C     states hepatitis B    Hypertension     Left basal ganglia ICH 5/6/2013    Metabolic acidosis, IAG, reduced excretion of inorganic acids     Myoclonic jerking 9/20/2016    Obesity     Secondary hyperparathyroidism (of renal origin)     TB lung, latent 8/25/2015    Thyroid disease      Past Surgical History:   Procedure Laterality Date    COLONOSCOPY      FOOT AMPUTATION THROUGH METATARSAL      left foot    LEG AMPUTATION THROUGH KNEE  12/18/2013    left BKA    R AVF   9/12/12     Allergies:   Review of patient's allergies indicates:  No Known Allergies  Medications:     No current facility-administered medications on file prior to encounter.      Current Outpatient Prescriptions on File Prior to Encounter   Medication Sig Dispense Refill    amlodipine (NORVASC) 10 MG tablet Take 1 tablet (10 mg total) by mouth every morning. 90 tablet 3    carvedilol (COREG) 12.5 MG tablet Take 1 tablet (12.5 mg total) by mouth 2 (two) times daily. 60 tablet 11    cinacalcet (SENSIPAR) 60 MG Tab Take 1 tablet (60 mg total) by mouth daily with breakfast. 30 tablet 3    docusate sodium (COLACE) 100 MG capsule Take 1 capsule (100 mg total) by mouth 2 (two) times daily.  0    duloxetine (CYMBALTA) 30 MG capsule Take 2 capsules (60 mg total) by mouth once daily.      gabapentin (NEURONTIN) 100 MG capsule Take 1 capsule (100 mg total) by mouth 3 (three) times daily. 90 capsule 3    gabapentin (NEURONTIN) 600 MG tablet Take 1 tablet (600 mg total) by mouth 3 (three) times daily. 180 tablet 1    hydrocodone-acetaminophen 5-325mg (NORCO) 5-325 mg per tablet Take 1 tablet by mouth 2 (two) times daily as needed. 60 tablet 0    ketoconazole (NIZORAL) 2 % shampoo Wash hair and affected areas of skin with medicated shampoo at least 2x/week - let sit on skin at least 5 minutes prior to rinsing 120 mL 5    lanthanum (FOSRENOL) 1000 MG chewable tablet Take 1 tablet (1,000 mg total) by mouth 3 (three) times daily with meals. 60 tablet 11    lisinopril (PRINIVIL,ZESTRIL) 20 MG tablet Take 2 tablets (40 mg total) by mouth once daily. 90 tablet 3    pantoprazole (PROTONIX) 40 MG tablet Take 1 tablet (40 mg total) by mouth once daily. 30 tablet 3    sodium chloride (OCEAN) 0.65 % nasal spray 1 spray by Nasal route 2 (two) times daily.  12    tramadol (ULTRAM) 50 mg tablet Take 1 tablet (50 mg total) by mouth 3 (three) times daily as needed for Pain. 90 tablet 2       Inpatient Medications   Continuous  Infusions:   Scheduled Meds:   sodium chloride 0.9%   Intravenous Once    amlodipine  10 mg Oral QAM    carvedilol  12.5 mg Oral BID    cinacalcet  60 mg Oral Daily with breakfast    lanthanum  1,000 mg Oral TID WM    pantoprazole  40 mg Oral BID    sodium chloride 0.9%  250 mL Intravenous Once    sodium chloride 0.9%  3 mL Intravenous Q8H     PRN Meds:sodium chloride 0.9%, acetaminophen, dextrose 50%, dextrose 50%, glucagon (human recombinant), glucose, glucose, hydrALAZINE, insulin aspart, labetalol, ondansetron, pneumoc 13-jefferson conj-dip cr(PF), promethazine (PHENERGAN) IVPB, senna-docusate 8.6-50 mg     Social History:     Social History   Substance Use Topics    Smoking status: Former Smoker     Packs/day: 1.00     Years: 10.00    Smokeless tobacco: Never Used    Alcohol use No     Family History:     Family History   Problem Relation Age of Onset    Early death Mother     Kidney disease Father     Hypertension Father     Heart disease Father     Hyperlipidemia Father     Diabetes Brother     Alcohol abuse Maternal Grandmother     Diabetes Maternal Grandfather     Melanoma Neg Hx      Physical Exam:     Vitals:  Temp:  [97.7 °F (36.5 °C)-99.5 °F (37.5 °C)]   Pulse:  [66-99]   Resp:  [8-42]   BP: ()/()   SpO2:  [96 %-100 %]   Arterial Line BP: ()/(37-63)  on room air I/O's:    Intake/Output Summary (Last 24 hours) at 02/24/17 1411  Last data filed at 02/24/17 1400   Gross per 24 hour   Intake          1066.33 ml   Output              800 ml   Net           266.33 ml        Constitutional: NAD, conversant  HEENT: Sclera anicteric, PERRLA, EOMI  Neck: Elevated JVD, no carotid bruits  CV: RRR,  normal S1/S2  Pulm: Bibasilar crackles minimal   GI: Abdomen soft, NTND, +BS  Extremities: No LE edema, warm and well perfused  Skin: No ecchymosis, erythema, or ulcers  Psych: AOx3, appropriate affect  Neuro: CNII-XII intact, no focal deficits    Labs:       Recent Labs  Lab  02/22/17  1017 02/23/17  0136 02/24/17  0300   * 136 137   K 5.2* 3.8 4.1   CL 93* 97 95   CO2 22* 24 27   BUN 68* 38* 60*   CREATININE 7.2* 4.8* 7.2*   ANIONGAP 20* 15 15       Recent Labs  Lab 02/23/17  0136 02/23/17  1412 02/24/17  0300   AST 25 26 21   ALT 18 19 17   ALKPHOS 155*  --  141*   BILITOT 1.2*  --  0.9   ALBUMIN 3.1*  --  2.9*       Recent Labs  Lab 02/22/17  1304 02/22/17  1413   TROPONINI 0.152*  --    BNP  --  >4900*      Recent Labs  Lab 02/22/17  1017 02/23/17  0136 02/24/17  0300   WBC 5.59 4.49 4.51   HGB 14.6 12.7* 12.4*   HCT 41.0 36.1* 35.1*    142* 161   GRAN 61.5  3.4 61.9  2.8 63.7  2.9       Recent Labs  Lab 02/23/17  0136   INR 1.3*     Lab Results   Component Value Date    CHOL 128 11/02/2016    HDL 33 (L) 11/02/2016    LDLCALC 73.2 11/02/2016    TRIG 109 11/02/2016     Lab Results   Component Value Date    HGBA1C text 04/01/2012        Micro:  Blood Cultures  Lab Results   Component Value Date    LABBLOO No growth after 5 days. 11/06/2016    LABBLOO No growth after 5 days. 11/03/2016    LABBLOO No growth after 5 days. 11/03/2016    LABBLOO No growth after 5 days. 09/14/2016    LABBLOO No growth after 5 days. 09/14/2016     Urine Cultures  Lab Results   Component Value Date    LABURIN No growth 08/06/2015    LABURIN NO GROWTH - FINAL REPORT. 04/01/2012    LABURIN NO GROWTH AFTER 48 HOURS. 05/07/2009       Imaging:         EF   Date Value Ref Range Status   02/23/2017 50 55 - 65    11/03/2016 45 55 - 65    09/04/2016 55 55 - 65    04/23/2015 55 55 - 65    05/06/2013 60         EKG: Normal sinus with nonspecific T wave abnormality and prolonged QT      Assessment:   1 y.o. male with history of ESRD ( HD MWF), previous L basal ganglia ICH 9/16, DMII, Hepatitis C, HTN, and severe tricuspid valve regurgitation admitted for hemorraghic stroke.  Recent echo shows EF 50, elevated PA pressures, and TVR.       Plan:   Severe Tricuspid Valve regurgitation and Pulm HTN  - Patient  is asymptomatic  - Would recommend volume removal via HD  - HTN management per primary with goal of 120/70  - If possible avoid QT prolonging agents.     - No further cardiac interventions at this time.  - VSS, stable for floor per primary team.    Signing off   Will discuss case with fellow and staff Dr. Owen       Signed:  Titus Lantigua M.D.   PGY-1  Pager [phone]      2/24/2017 2:11 PM

## 2017-02-24 NOTE — PT/OT/SLP PROGRESS
"Physical Therapy  Treatment    Vaughn Retana   MRN: [id number]   Admitting Diagnosis: Left-sided nontraumatic intracerebral hemorrhage    PT Received On: 17  PT Start Time: 1335     PT Stop Time: 1349    PT Total Time (min): 14 min       Billable Minutes:  Therapeutic Exercise 14    Treatment Type: Treatment  PT/PTA: PT             General Precautions: Standard, fall, aspiration  Orthopedic Precautions: N/A   Braces: N/A    Do you have any cultural, spiritual, Taoism conflicts, given your current situation?: none stated    Subjective:  Communicated with ISMAEL Camejo prior to session.  RN reports pt's BP dropped, so she transferred pt back to bed.   PT and RN agreed to keep today's visit to bed level only 2° BP.     "I'm tired." per patient    Pain Ratin/10  Pain Rating Post-Intervention: 0/10    Objective:   Patient found with: blood pressure cuff, telemetry, pulse ox (continuous), peripheral IV    Functional Mobility:  Bed Mobility: not appropriate this visit    Balance:   Unable to assess this visit    Therapeutic Activities and Exercises:  Performed the following exercises in sitting/standing/supine:    Lower Extremity:   AP x 20 R LE   Heel slides x 15 B LE   Quad sets x 15 B LE   Glute sets x 15   Hip abd/add x 15 B LE   Adductor squeeze x 15    Rest break provided between sets as tolerated.     Education:  Education provided to pt regarding: PT role/POC; supine HEP. Verbalized & demonstrated understanding.     Whiteboard updated with correct mobility information. RN/PCT notified.  Pt ok to transfer via stand pivot with RN/PCT. Use RW/HHA x 1 person.      AM-PAC 6 CLICK MOBILITY  How much help from another person does this patient currently need?   1 = Unable, Total/Dependent Assistance  2 = A lot, Maximum/Moderate Assistance  3 = A little, Minimum/Contact Guard/Supervision  4 = None, Modified [place]/Independent    Turning over in bed (including adjusting bedclothes, sheets and blankets)?: " 3  Sitting down on and standing up from a chair with arms (e.g., wheelchair, bedside commode, etc.): 3  Moving from lying on back to sitting on the side of the bed?: 3  Moving to and from a bed to a chair (including a wheelchair)?: 3  Need to walk in hospital room?: 3  Climbing 3-5 steps with a railing?: 2  Total Score: 17    AM-PAC Raw Score CMS G-Code Modifier Level of Impairment Assistance   6 % Total / Unable   7 - 9 CM 80 - 100% Maximal Assist   10 - 14 CL 60 - 80% Moderate Assist   15 - 19 CK 40 - 60% Moderate Assist   20 - 22 CJ 20 - 40% Minimal Assist   23 CI 1-20% SBA / CGA   24 CH 0% Independent/ Mod I     Patient left supine with all lines intact, call button in reach and RN present.    Assessment:  Vaughn Retana is a 51 y.o. male with a medical diagnosis of Left-sided nontraumatic intracerebral hemorrhage and presents with decrease endurance, strength, balance, coordination, and safety with overall mobility. Pt tolerated session well and continues to progress towards goals.  Pt only appropriate for bed level exercises this visit 2° hypotension. Progress towards goals limited/impacted by hypotension; endurance.  Pt would benefit from continued skilled physical therapy to address listed impairments, improve functional independence, and maximize safety with mobility.  PT recommends dc disposition to [facility] for further IP therapy to improve strength & endurance. Pt demonstrates good potential to progress and would benefit from 3 h/day to maximize recovery to gain functional independence prior to d/c home.        Rehab identified problem list/impairments: Rehab identified problem list/impairments: weakness, impaired endurance, impaired balance, decreased lower extremity function, gait instability, impaired functional mobilty, decreased safety awareness    Rehab potential is good.    Activity tolerance: Good    Discharge recommendations: Discharge Facility/Level Of Care Needs: rehabilitation  facility     Barriers to discharge: Barriers to Discharge: Inaccessible home environment, Decreased caregiver support (2nd floor apt with ALEXEY)    Equipment recommendations: Equipment Needed After Discharge:  (TBD pending progress)     GOALS:   Physical Therapy Goals        Problem: Physical Therapy Goal    Goal Priority Disciplines Outcome Goal Variances Interventions   Physical Therapy Goal     PT/OT, PT      Description:  Goals to be met by: 3/4/17     Patient will increase functional independence with mobility by performin. Supine to sit with Contact Guard Assistance  2. Sit to supine with Contact Guard Assistance  3. Sit to stand transfer with Stand-by Assistance  4. Bed to chair transfer with Stand-by Assistance using Rolling Walker  5. Gait  x 50 feet with Contact Guard Assistance using Rolling Walker.   6. Ascend/descend 5 stair with right Handrails Minimal Assistance.   7. Lower extremity exercise program x 20 reps with [place] (15 reps MET )                 PLAN:    Patient to be seen 6 x/week  to address the above listed problems via gait training, therapeutic activities, therapeutic exercises, neuromuscular re-education  Plan of Care expires: 17  Plan of Care reviewed with: patient         Damaris Humphreys, PT  2017

## 2017-02-24 NOTE — PT/OT/SLP EVAL
Speech Language Pathology Evaluation    Vaughn Retana   MRN: [id number]   Admitting Diagnosis: Left-sided nontraumatic intracerebral hemorrhage    Diet recommendations: Solid Diet Level: Regular  Liquid Diet Level: Thin Feed only when awake/alert, HOB to 90 degrees, Small bites/sips, Alternating bites/sips and 1 bite/sip at a time    SLP Treatment Date: 17  Speech Start Time: 1025     Speech Stop Time: 1043     Speech Total (min): 18 min       TREATMENT BILLABLE MINUTES:  Eval 10  and Treatment Swallowing Dysfunction 8    Diagnosis: Left-sided nontraumatic intracerebral hemorrhage      Past Medical History:   Diagnosis Date    Acute on chronic diastolic CHF (congestive heart failure) 2016    Anemia     Chronic low back pain 2015    Diabetes mellitus, type II     Diabetic neuropathy     ESRD on hemodialysis     Hepatitis C     states hepatitis B    Hypertension     Left basal ganglia ICH 2013    Metabolic acidosis, IAG, reduced excretion of inorganic acids     Myoclonic jerking 2016    Obesity     Secondary hyperparathyroidism (of renal origin)     TB lung, latent 2015    Thyroid disease      Past Surgical History:   Procedure Laterality Date    COLONOSCOPY      FOOT AMPUTATION THROUGH METATARSAL      left foot    LEG AMPUTATION THROUGH KNEE  2013    left BKA    R AVF  12       Has the patient been evaluated by SLP for swallowing? : Yes  Keep patient NPO?: No   General Precautions: Standard,            Social Hx: Patient lives with girlfiend.    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    Subjective:  Pt was alert.    Patient goals: Pt wants to continue to participate in fishing.    Pain Ratin/10              Pain Rating Post-Intervention: 0/10    Objective:        Oral Musculature Evaluation  Oral Musculature: general  weakness  Dentition: present and adequate  Mucosal Quality: good  Mandibular Strength and Mobility: WFL  Oral Labial Strength and Mobility: WFL  Lingual Strength and Mobility: WFL  Buccal Strength and Mobility: WFL  Volitional Cough: able to demonstrate  Volitional Swallow: able to demonstrate  Voice Prior to PO Intake: clear     Cognitive Status:  Behavioral Observations: alert and appropriate-  Immediate Memory, Simple problem-solving, Simple reasoning: X6 100% acc indep  Delayed Memory: X3 0% acc indep  Orientation: X3 100% acc indep  Mental Manipulation: X2 0% acc indep  Sequencing: X2 50% acc indep    Language:     Receptive Language: able to identify objects FC>4 X2 100% acc indep; answers simple yes/no questions X2 100% acc indep.    Expressive Language: divergent naming 60% acc indep; convergent naming X2 100% acc indep; name objects X2 with >FC4 with 100% acc indep; automatic speech X1 ( months of yr) 100% acc indep and repetition X1 100% acc indep.    Reading: Read and identified X2 sentences with 100% acc indep    Voice: clear    Motor speech: mild dysarthria     Additional Treatment:    Pt tolerated 1 isabel cracker and 4 oz of thin liquid via straw without s/s of airway compromise. Oral phase cb mild excess mastication; cleared with single swallows of thin liquid. Skilled education provided on aspiration precautions and diet recommendations. Whiteboard updated with diet recommendations/aspiration precautions. No further questions. Pt admits to not being at cognitive-linguistic baseline since admitted.        Assessment:  Vaughn Retana is a 51 y.o. male with a SLP diagnosis of Left-sided nontraumatic intracerebral hemorrhage . He present with dysarthria, oral dysphagia and cognitive-linguistic impairment. Continued progress towards goals.     Do you have any cultural, spiritual, Catholic conflicts, given your current situation?: no    Discharge recommendations: Discharge Facility/Level Of Care  Needs: rehabilitation facility (pending PT OT recs)     Goals:   Problem: SLP Goal  Goal: SLP Goal  SLP Plan of Care: Speech Pathology will follow pt daily M-F and address the following goals x1 week:  3/2  1. Pt will tolerate dental soft diet and thin liquids without s/s of airway compromise. MET  2. Pt will perform oral strengthening exercises X10 per session provided min cues to improve safe swallow function. ONGOING  3. Pt will tolerate trials of regular solids with adequate oral clearance. MET  4. If MD agrees, pt will participate in speech language cognitive eval to determine need for intervention. MET    Speech Pathology will follow pt daily M-F and address the following goals x5 week:  3/9  1. Pt will perform oral strengthening exercises X10 per session provided min cues to improve safe swallow function.   2. Pt will tolerate regular diet and thin liquids without s/s of airway compromise.   3. Pt will complete simple sequencing task with 80% acc provided min cues to improve cognition.  4. Pt will complete simple mental manipulation task with 80% acc provided min cues to improve cognitive functioning.   5. Pt will complete delayed memory task with 80% acc provided min cues to improve cognition   6. Pt will participate in reading, writing, v.s. and following commands to determine need for intervention.   7. Pt will complete divergent naming task with 80% acc provided min cues to improve expressive language.      Plan:   Patient to be seen Therapy Frequency: 5 x/week   Plan of Care expires: 03/24/17  Plan of Care reviewed with: patient  SLP Follow-up?: Yes  SLP - Next Visit Date: 02/27/17           TAVO Almeida  02/24/2017

## 2017-02-24 NOTE — PROGRESS NOTES
Progress Note  Neurosurgery    Admit Date: 2/22/2017  Post-operative Day:    Hospital Day: 3    SUBJECTIVE:     Vaughn Retana is a 51 y.o. male with L basal ganglia ICH 9/16, anemia, DMII, Hepatitis C, HTN, acute on chronic diastolic CHF, L BKA who presents with L caudate and right basal ggl ICH.  Follow-up For:  * No surgery found *    No AE. AFVSS. Denies HA/N/V.   Scheduled Meds:   amlodipine  10 mg Oral QAM    carvedilol  12.5 mg Oral BID    cinacalcet  60 mg Oral Daily with breakfast    lanthanum  1,000 mg Oral TID WM    lisinopril  40 mg Oral Daily    sodium chloride 0.9%  3 mL Intravenous Q8H     Continuous Infusions:   PRN Meds:acetaminophen, dextrose 50%, dextrose 50%, glucagon (human recombinant), glucose, glucose, hydrALAZINE, insulin aspart, labetalol, ondansetron, pneumoc 13-jefferson conj-dip cr(PF), promethazine (PHENERGAN) IVPB, senna-docusate 8.6-50 mg    Review of patient's allergies indicates:  No Known Allergies    OBJECTIVE:     Vital Signs (Most Recent)  Temp: 98.5 °F (36.9 °C) (02/24/17 0305)  Pulse: 76 (02/24/17 0605)  Resp: (!) 23 (02/24/17 0605)  BP: 128/74 (02/24/17 0605)  SpO2: 98 % (02/24/17 0605)    Vital Signs Range (Last 24H):  Temp:  [98.4 °F (36.9 °C)-99.5 °F (37.5 °C)]   Pulse:  [74-99]   Resp:  [8-42]   BP: (109-178)/()   SpO2:  [95 %-100 %]     I & O (Last 24H):    Intake/Output Summary (Last 24 hours) at 02/24/17 0710  Last data filed at 02/24/17 0600   Gross per 24 hour   Intake              770 ml   Output              800 ml   Net              -30 ml     Physical Exam:  Awake, alert, oriented to person/place  PERRL, EOMI, face symm, tongue midline  FC x4  SILT  No drift    Lines/Drains:       Peripheral IV - Single Lumen 02/22/17 1211 Left Wrist (Active)   Site Assessment Clean;Dry;Intact;No redness;No swelling 2/23/2017  3:00 AM   Line Status Flushed;Saline locked 2/23/2017  3:00 AM   Dressing Status Clean;Dry;Intact 2/23/2017  3:00 AM   Dressing Intervention  Dressing reinforced 2/23/2017  3:00 AM   Dressing Change Due 02/26/17 2/23/2017  3:00 AM   Site Change Due 02/26/17 2/22/2017  7:01 PM   Reason Not Rotated Not due 2/23/2017  3:00 AM   Number of days:0            Peripheral IV - Single Lumen 02/23/17 0030 Left Hand (Active)   Site Assessment Clean;Dry;Intact;No redness;No swelling 2/23/2017  3:00 AM   Line Status Blood return noted;Flushed;Saline locked 2/23/2017  3:00 AM   Dressing Status Clean;Dry;Intact 2/23/2017  3:00 AM   Dressing Intervention Dressing reinforced 2/23/2017  3:00 AM   Dressing Change Due 02/27/17 2/23/2017  3:00 AM   Site Change Due 02/27/17 2/23/2017 12:30 AM   Reason Not Rotated Not due 2/23/2017  3:00 AM   Number of days:0            Hemodialysis AV Graft 01/22/11 1828 Right upper arm (Active)   Site Assessment No redness;No swelling 2/23/2017  3:00 AM   Patency Present;Thrill;Bruit 2/22/2017  9:41 PM   Status Accessed 2/22/2017  9:41 PM   Flows Good 2/22/2017  9:41 PM   Dressing Intervention New dressing 2/22/2017  9:41 PM   Dressing Status Clean;Dry;Intact 2/22/2017  9:41 PM   Site Condition No complications 2/22/2017  9:41 PM   Number of days:2223       Wound/Incision:  na    Laboratory:  CBC:     Recent Labs  Lab 02/24/17  0300   WBC 4.51   RBC 4.07*   HGB 12.4*   HCT 35.1*      MCV 86   MCH 30.5   MCHC 35.3     CMP:     Recent Labs  Lab 02/24/17  0300   *   CALCIUM 9.4   ALBUMIN 2.9*   PROT 7.9      K 4.1   CO2 27   CL 95   BUN 60*   CREATININE 7.2*   ALKPHOS 141*   ALT 17   AST 21   BILITOT 0.9     Coagulation:     Recent Labs  Lab 02/23/17  0136   INR 1.3*     Cardiac markers:     Recent Labs  Lab 02/22/17  1304   CPKMB 1.8   TROPONINI 0.152*     No results for input(s): COLORU, CLARITYU, SPECGRAV, PHUR, PROTEINUA, GLUCOSEU, BILIRUBINCON, BLOODU, WBCU, RBCU, BACTERIA, MUCUS, NITRITE, LEUKOCYTESUR, UROBILINOGEN, HYALINECASTS in the last 168 hours.      Diagnostic Results:  CT head: stable from  prior.    ASSESSMENT/PLAN:     Assessment: 51 M with pmhx of L basal ganglia ICH 9/16, anemia, DMII, Hepatitis C, HTN, acute on chronic diastolic CHF, L BKA who presents with left caudate ICH and right basal ggl ICH.  ICH of 0.    Neuro stable  SBP less than 150  Pulm- RA  FENGI- Goal eunatremia  ESRD: mgmt per renal  Medical mgmt per [facility]  No surgical intervention.   Follow  Up prn  Call w/ questions.

## 2017-02-24 NOTE — PLAN OF CARE
Problem: Physical Therapy Goal  Goal: Physical Therapy Goal  Goals to be met by: 3/4/17     Patient will increase functional independence with mobility by performin. Supine to sit with Contact Guard Assistance  2. Sit to supine with Contact Guard Assistance  3. Sit to stand transfer with Stand-by Assistance  4. Bed to chair transfer with Stand-by Assistance using Rolling Walker  5. Gait x 50 feet with Contact Guard Assistance using Rolling Walker.   6. Ascend/descend 5 stair with right Handrails Minimal Assistance.   7. Lower extremity exercise program x 20 reps with [place] (15 reps MET )            Pt progressing towards goals. All goals remain appropriate at this time.     Damaris Humphreys, PT, DPT  2017

## 2017-02-24 NOTE — PLAN OF CARE
Problem: Patient Care Overview  Goal: Plan of Care Review  Outcome: Revised  POC discussed w/ Mr. Retana at 1400. Mr. Retana verbalized understanding of POC. All questions and concerns addressed. Consulted Cards, Neurovascular, and GI today. Pt had period of hypotension, responsive to 250cc bolus of NS. HD plan for today. Arterial line placed today and Flotrac connected. Plan to TTF. Pt progressing towards goals. Will continue to monitor. See flowsheets for full assessment and VS information.

## 2017-02-24 NOTE — PROCEDURES
"Vaughn Retana is a 51 y.o. male patient.    Temp: 98.6 °F (37 °C) (17)  Pulse: 74 (17)  Resp: 13 (17)  BP: (!) 155/85 (17)  SpO2: 98 % (17)  Weight: 71.8 kg (158 lb 4.6 oz) (17)  Height: 5' 6" (167.6 cm) (17)       Arterial Line  Date/Time: 2017 10:26 AM  Performed by: ANABELL RIVERA  Authorized by: TAYLOR ARAUZ   Consent Done: Yes  Consent: Written consent obtained.  Risks and benefits: risks, benefits and alternatives were discussed  Consent given by: patient  Patient understanding: patient states understanding of the procedure being performed  Patient identity confirmed:  and name  Time out: Immediately prior to procedure a "time out" was called to verify the correct patient, procedure, equipment, support staff and site/side marked as required.  Location: left radial  Marco's test normal: yes  Needle gauge: 20  Seldinger technique: Seldinger technique used  Number of attempts: 1              Anabell Rivera PA-C  Neurocritical Care  [facility]  Pager/Spectralink #[phone]  "

## 2017-02-24 NOTE — PROGRESS NOTES
Notified MD Marco with NCC regarding pt hypotensive 88/46 map 60 a-line, bp cuff correlating 92/58 map 68. CO 4.3, CI 2.3. BP medications given this morning as ordered. Order obtained for 250mls NS bolus. Will continue to monitor very closely.

## 2017-02-25 VITALS
BODY MASS INDEX: 25.44 KG/M2 | WEIGHT: 158.31 LBS | SYSTOLIC BLOOD PRESSURE: 129 MMHG | TEMPERATURE: 98 F | OXYGEN SATURATION: 97 % | DIASTOLIC BLOOD PRESSURE: 81 MMHG | HEART RATE: 67 BPM | RESPIRATION RATE: 16 BRPM | HEIGHT: 66 IN

## 2017-02-25 LAB
ALBUMIN SERPL BCP-MCNC: 3 G/DL
ALP SERPL-CCNC: 142 U/L
ALT SERPL W/O P-5'-P-CCNC: 15 U/L
ANION GAP SERPL CALC-SCNC: 18 MMOL/L
AST SERPL-CCNC: 22 U/L
BASOPHILS # BLD AUTO: 0.02 K/UL
BASOPHILS NFR BLD: 0.5 %
BILIRUB SERPL-MCNC: 0.9 MG/DL
BUN SERPL-MCNC: 70 MG/DL
CALCIUM SERPL-MCNC: 8.1 MG/DL
CHLORIDE SERPL-SCNC: 96 MMOL/L
CO2 SERPL-SCNC: 22 MMOL/L
CREAT SERPL-MCNC: 8.5 MG/DL
DIFFERENTIAL METHOD: ABNORMAL
EOSINOPHIL # BLD AUTO: 0.1 K/UL
EOSINOPHIL NFR BLD: 3.1 %
ERYTHROCYTE [DISTWIDTH] IN BLOOD BY AUTOMATED COUNT: 17 %
EST. GFR  (AFRICAN AMERICAN): 7.5 ML/MIN/1.73 M^2
EST. GFR  (NON AFRICAN AMERICAN): 6.5 ML/MIN/1.73 M^2
GLUCOSE SERPL-MCNC: 123 MG/DL
HCT VFR BLD AUTO: 34.9 %
HGB BLD-MCNC: 12.2 G/DL
LYMPHOCYTES # BLD AUTO: 0.9 K/UL
LYMPHOCYTES NFR BLD: 21.1 %
MAGNESIUM SERPL-MCNC: 1.9 MG/DL
MCH RBC QN AUTO: 30.3 PG
MCHC RBC AUTO-ENTMCNC: 35 %
MCV RBC AUTO: 87 FL
MONOCYTES # BLD AUTO: 0.5 K/UL
MONOCYTES NFR BLD: 11.2 %
NEUTROPHILS # BLD AUTO: 2.7 K/UL
NEUTROPHILS NFR BLD: 63.9 %
PHOSPHATE SERPL-MCNC: 5.9 MG/DL
PLATELET # BLD AUTO: 151 K/UL
PMV BLD AUTO: 11.7 FL
POCT GLUCOSE: 126 MG/DL (ref 70–110)
POTASSIUM SERPL-SCNC: 4.8 MMOL/L
PROT SERPL-MCNC: 8 G/DL
RBC # BLD AUTO: 4.02 M/UL
SODIUM SERPL-SCNC: 136 MMOL/L
WBC # BLD AUTO: 4.21 K/UL

## 2017-02-25 PROCEDURE — 25000003 PHARM REV CODE 250: Performed by: STUDENT IN AN ORGANIZED HEALTH CARE EDUCATION/TRAINING PROGRAM

## 2017-02-25 PROCEDURE — 25000003 PHARM REV CODE 250: Performed by: NURSE PRACTITIONER

## 2017-02-25 PROCEDURE — 90935 HEMODIALYSIS ONE EVALUATION: CPT | Mod: ,,, | Performed by: INTERNAL MEDICINE

## 2017-02-25 PROCEDURE — 25000003 PHARM REV CODE 250: Performed by: INTERNAL MEDICINE

## 2017-02-25 PROCEDURE — 84100 ASSAY OF PHOSPHORUS: CPT

## 2017-02-25 PROCEDURE — 80053 COMPREHEN METABOLIC PANEL: CPT

## 2017-02-25 PROCEDURE — 80100016 HC MAINTENANCE HEMODIALYSIS

## 2017-02-25 PROCEDURE — 87340 HEPATITIS B SURFACE AG IA: CPT

## 2017-02-25 PROCEDURE — 27201247 HC HEMODIALYSIS, SET-UP & CANCEL

## 2017-02-25 PROCEDURE — 25000003 PHARM REV CODE 250: Performed by: PSYCHIATRY & NEUROLOGY

## 2017-02-25 PROCEDURE — 97116 GAIT TRAINING THERAPY: CPT

## 2017-02-25 PROCEDURE — 36415 COLL VENOUS BLD VENIPUNCTURE: CPT

## 2017-02-25 PROCEDURE — 83735 ASSAY OF MAGNESIUM: CPT

## 2017-02-25 PROCEDURE — 99233 SBSQ HOSP IP/OBS HIGH 50: CPT | Mod: GC,,, | Performed by: PSYCHIATRY & NEUROLOGY

## 2017-02-25 PROCEDURE — 85025 COMPLETE CBC W/AUTO DIFF WBC: CPT

## 2017-02-25 RX ORDER — HEPARIN SODIUM 5000 [USP'U]/ML
5000 INJECTION, SOLUTION INTRAVENOUS; SUBCUTANEOUS EVERY 8 HOURS
Status: DISCONTINUED | OUTPATIENT
Start: 2017-02-25 | End: 2017-02-25 | Stop reason: HOSPADM

## 2017-02-25 RX ADMIN — LANTHANUM CARBONATE 1000 MG: 500 TABLET, CHEWABLE ORAL at 04:02

## 2017-02-25 RX ADMIN — Medication 3 ML: at 06:02

## 2017-02-25 RX ADMIN — PANTOPRAZOLE SODIUM 40 MG: 40 TABLET, DELAYED RELEASE ORAL at 04:02

## 2017-02-25 RX ADMIN — CARVEDILOL 12.5 MG: 12.5 TABLET, FILM COATED ORAL at 04:02

## 2017-02-25 RX ADMIN — Medication 3 ML: at 04:02

## 2017-02-25 RX ADMIN — HEPARIN SODIUM 5000 UNITS: 5000 INJECTION, SOLUTION INTRAVENOUS; SUBCUTANEOUS at 04:02

## 2017-02-25 RX ADMIN — SODIUM CHLORIDE: 0.9 INJECTION, SOLUTION INTRAVENOUS at 10:02

## 2017-02-25 RX ADMIN — CINACALCET HYDROCHLORIDE 60 MG: 60 TABLET, COATED ORAL at 04:02

## 2017-02-25 NOTE — ASSESSMENT & PLAN NOTE
- f/u  Echo Low normal to mildly depressed left ventricular systolic function (EF 50-55%) + Left ventricular diastolic dysfunction. + Pulmonary hypertension. The estimated PA systolic pressure is 86 mmHg.     - continue coreg  - hold lisinopril ? ZAINAB on CKD

## 2017-02-25 NOTE — CONSULTS
[facility]  Vascular Neurology  Comprehensive Stroke Center  Consult Note      Consults  Subjective:     History of Present Illness:  Vaughn Retana is a 51 y.o. male with previous L basal ganglia ICH 9/16, anemia, DMII, Hepatitis C, HTN (medication non-compliance), acute on chronic diastolic CHF, L BKA who presents to [facility] ED for evaluation of HA, nausea, and vomiting x 2 days.     No focal neurological deficits on admission. BP at presentation 208/117. Found to have small L caudate bleed on CT with several additional small hemorrhagic foci, hence vascular neurology consulted. Currently, pt denies weakness, HA, seizure, or blurred vision. He denies anticoagulant use.           Past Medical History:   Diagnosis Date    Acute on chronic diastolic CHF (congestive heart failure) 9/14/2016    Anemia     Chronic low back pain 12/1/2015    Diabetes mellitus, type II     Diabetic neuropathy     ESRD on hemodialysis     Hepatitis C     states hepatitis B    Hypertension     Left basal ganglia ICH 5/6/2013    Metabolic acidosis, IAG, reduced excretion of inorganic acids     Myoclonic jerking 9/20/2016    Obesity     Secondary hyperparathyroidism (of renal origin)     TB lung, latent 8/25/2015    Thyroid disease      Past Surgical History:   Procedure Laterality Date    COLONOSCOPY      FOOT AMPUTATION THROUGH METATARSAL      left foot    LEG AMPUTATION THROUGH KNEE  12/18/2013    left BKA    R AVF  9/12/12     Family History   Problem Relation Age of Onset    Early death Mother     Kidney disease Father     Hypertension Father     Heart disease Father     Hyperlipidemia Father     Diabetes Brother     Alcohol abuse Maternal Grandmother     Diabetes Maternal Grandfather     Melanoma Neg Hx      Social History   Substance Use Topics    Smoking status: Former Smoker     Packs/day: 1.00     Years: 10.00    Smokeless tobacco: Never Used    Alcohol use No     Review of  patient's allergies indicates:  No Known Allergies  Medications: I have reviewed the current medication administration record.    Prescriptions Prior to Admission   Medication Sig Dispense Refill Last Dose    amlodipine (NORVASC) 10 MG tablet Take 1 tablet (10 mg total) by mouth every morning. 90 tablet 3 1/11/2017 at 2100    carvedilol (COREG) 12.5 MG tablet Take 1 tablet (12.5 mg total) by mouth 2 (two) times daily. 60 tablet 11 1/11/2017 at 2100    cinacalcet (SENSIPAR) 60 MG Tab Take 1 tablet (60 mg total) by mouth daily with breakfast. 30 tablet 3 1/11/2017 at 0900    docusate sodium (COLACE) 100 MG capsule Take 1 capsule (100 mg total) by mouth 2 (two) times daily.  0 1/11/2017 at 2100    duloxetine (CYMBALTA) 30 MG capsule Take 2 capsules (60 mg total) by mouth once daily.       gabapentin (NEURONTIN) 100 MG capsule Take 1 capsule (100 mg total) by mouth 3 (three) times daily. 90 capsule 3 1/11/2017 at 2100    gabapentin (NEURONTIN) 600 MG tablet Take 1 tablet (600 mg total) by mouth 3 (three) times daily. 180 tablet 1     hydrocodone-acetaminophen 5-325mg (NORCO) 5-325 mg per tablet Take 1 tablet by mouth 2 (two) times daily as needed. 60 tablet 0     ketoconazole (NIZORAL) 2 % shampoo Wash hair and affected areas of skin with medicated shampoo at least 2x/week - let sit on skin at least 5 minutes prior to rinsing 120 mL 5 11/14/2016    lanthanum (FOSRENOL) 1000 MG chewable tablet Take 1 tablet (1,000 mg total) by mouth 3 (three) times daily with meals. 60 tablet 11 1/11/2017 at 2100    lisinopril (PRINIVIL,ZESTRIL) 20 MG tablet Take 2 tablets (40 mg total) by mouth once daily. 90 tablet 3     pantoprazole (PROTONIX) 40 MG tablet Take 1 tablet (40 mg total) by mouth once daily. 30 tablet 3 11/14/2016    sodium chloride (OCEAN) 0.65 % nasal spray 1 spray by Nasal route 2 (two) times daily.  12 11/14/2016    tramadol (ULTRAM) 50 mg tablet Take 1 tablet (50 mg total) by mouth 3 (three) times daily  as needed for Pain. 90 tablet 2        Review of Systems   Constitutional: Negative for chills and fever.   HENT: Negative for sore throat and trouble swallowing.    Eyes: Negative for visual disturbance.   Respiratory: Negative for shortness of breath.    Cardiovascular: Negative for chest pain and palpitations.   Gastrointestinal: Negative for abdominal distention and abdominal pain.   Musculoskeletal: Negative for joint swelling, neck pain and neck stiffness.   Neurological: Negative for dizziness, tremors, seizures, syncope, facial asymmetry, speech difficulty, light-headedness, numbness and headaches.   Psychiatric/Behavioral: Negative for agitation.     Objective:     Vital Signs (Most Recent):  Temp: 97.8 °F (36.6 °C) (02/24/17 1502)  Pulse: 72 (02/24/17 1802)  Resp: 13 (02/24/17 1802)  BP: 109/77 (02/24/17 1802)  SpO2: 98 % (02/24/17 1802)    Vital Signs Range (Last 24H):  Temp:  [97.7 °F (36.5 °C)-98.8 °F (37.1 °C)]   Pulse:  [65-89]   Resp:  [8-42]   BP: ()/()   SpO2:  [97 %-100 %]   Arterial Line BP: ()/(37-66)     Physical Exam   Constitutional: He appears distressed.   HENT:   Head: Normocephalic and atraumatic.   Eyes: Pupils are equal, round, and reactive to light.   Neck: Normal range of motion.   Cardiovascular: Normal rate and regular rhythm.    Pulmonary/Chest: Effort normal. No respiratory distress.   Abdominal: Soft. Bowel sounds are normal.   Neurological: GCS eye subscore is 4 - spontaneous. GCS verbal subscore is 5 - oriented. GCS motor subscore is 6 - obeys commands.   Skin: No rash noted. No erythema.   Psychiatric: He has a normal mood and affect.       Neurological Exam:   LOC: alert and follows requests  Language: No aphasia  Speech: No dysarthria  Orientation: Person, Place, Not oriented to time  Memory: Recent memory intact, Age correct, Month correct  Visual Fields (CN II): Full  EOM (CN III, IV, VI): Full/intact  Oculocephalics: normal  Pupils (CN III, IV, VI):  PERRL  Facial Sensation (CN V): Symmetric, Corneal reflex intact  Facial Movement (CN VII): symmetric facial expression  Hearing (CN VIII): intact bilaterally  Gag Reflex (CN IX, X): normal/symmetric  Shoulder/Neck (CN XI): SCM-Left: Normal ; SCM-Right: Normal ; Shoulder Shrug: Normal/Symetric  Tongue (CN XII): to midline  Reflexes: flexor plantar responses bilaterally and 2+ throughout  Motor*: Arm Left:  Normal (5/5), Leg Left:   Plegic (0/5), Arm Right:   Normal (5/5), Leg Right:   Normal (5/5)  Cerebellar*: L BKA, gait / balance not tested. Normal finger nose testin  Sensation: intact to light touch, temperature and vibration  Tone: Arm-Left: normal; Leg-Left: normal; Arm-Right: normal; Leg-Right: normal    NIH Stroke Scale:    Level of Consciousness: 0 - alert  LOC Questions: 0 - answers both correctly  LOC Commands: 0 - performs both correctly  Best Gaze: 0 - normal  Visual: 0 - no visual loss  Facial Palsy: 0 - normal  Motor Left Arm: 0 - no drift  Motor Right Arm: 0 - no drift  Motor Left Leg: UN - amputation, joint fusion  Motor Right Le - no drift  Limb Ataxia: UN - amputation, joint fusion  Sensory: 0 - normal  Best Language: 0 - no aphasia  Dysarthria: 0 - normal articulation  Extinction and Inattention: 0 - no neglect  NIH Stroke Scale Total: 0  [place] Coma Scale:  Best Eye Response: 4 - spontaneous  Best Motor Response: 6 - obeys commands  Best Verbal Response: 5 - oriented  [place] Coma Scale Total: 15      Laboratory:  BMP:   Recent Labs  Lab 17  0300      K 4.1   CL 95   CO2 27   BUN 60*   CREATININE 7.2*   CALCIUM 9.4     CBC:   Recent Labs  Lab 17  0300   WBC 4.51   RBC 4.07*   HGB 12.4*   HCT 35.1*      MCV 86   MCH 30.5   MCHC 35.3     Lipid Panel: No results for input(s): CHOL, LDLCALC, HDL, TRIG in the last 168 hours.  Platelet Aggregation Study: No results for input(s): PLTAGG, PLTAGINTERP, PLTAGREGLACO, ADPPLTAGGREG in the last 168 hours.  Hgb A1C: No results  for input(s): HGBA1C in the last 168 hours.  TSH:   Recent Labs  Lab 02/22/17  1413   TSH 0.572       Diagnostic Results:  Brain Imaging:  CT Head 2/22: Stable exam demonstrating small area of acute intraparenchymal hemorrhage within the left caudate head.  No new intracranial hemorrhage or major vascular territory infarct.    MRI Brain 2/23: Acute/subacute left caudate hemorrhage, correlating with finding on recent CT.  There is sequela of bilateral remote infarcts/hemorrhage and chronic microangiopathic changes, similar in appearance to the previous MRI.  No evidence of hydrocephalus,   midline shift, or acute infarct.    Cardiac Evaluation:    1 - Low normal to mildly depressed left ventricular systolic function (EF 50-55%).     2 - Right ventricular enlargement with mildly depressed systolic function.     3 - Left ventricular diastolic dysfunction.     4 - Right atrial enlargement.     5 - Severe tricuspid regurgitation.     6 - Pulmonary hypertension. The estimated PA systolic pressure is 86 mmHg.     7 - Increased central venous pressure.     Assessment/Plan:     Patient is a 51 y.o. year old male with:    * Left-sided nontraumatic intracerebral hemorrhage  Acute/subacute left caudate hemorrhage / Secondary to uncontrolled HTN  Antithrombotics for secondary stroke prevention: None: Reason:  Hemorrhagic Stroke, Statins for secondary stroke prevention and hyperlipidemia, if present: None: Reason: Get lipid panel and initiate atorvostatin , Aggressive risk factor modification: Hypertension, Smoking, Diabetes, High Cholesterol and Exercise, Rehab Efforts: Physical Therapy, Occupational Therapy, Speech and Language Pathology and Physical Medicine and Rehabilitation, Diagnostics: Ordered/Pending Lipid Profile to assess cholesterol levels, VTE Prophylaxis: None: Reason for No Pharmacological VTE Prophylaxis: HIstory of systemic or intracranial bleeding, BP Parameters: --140 considering chornic uncontrolled  HTN, DBP  considering chronic uncontrolled HTN, MAP AS PER NCC TEAM-AS PER NCC TEAM, Nursing Orders: Neuro checks- as per NCC team, Swallowing evaluation by Nursing, Seizure precautions and IV Fluids: as per NCC team    - Patient MRI reveals sequela of bilateral remote infarcts/hemorrhage and chronic microangiopathic changes.   - close monitoring of BP for avoiding hypotensive episodes or abrupt fall in BP required. High risk for hypotensive infarct / watershed infarcts  - dose home BP meds accordingly and also consideration to be given for chronic uncontrolled HTN and less strict standard BP control/target for ICH patients   - N/V controlled with IV Reglan / prn zofran    Essential hypertension  - HTN uncontrolled on admission  - Home meds include amlo, lisinopril and coreg    - currently on amlo / lisinopril  - BP well controlled, with episodes of borderline hypotension noted   - Patient MRI reveals sequela of bilateral remote infarcts/hemorrhage and chronic microangiopathic changes. Hence close monitoring of BP for avoiding hypotensive episodes or abrupt fall in BP required. High risk for hypotensive infarct / watershed infarcts  - dose home BP meds accordingly and also consideration to be given for chronic uncontrolled HTN and less strict standard BP control/target for ICH patients (skip scheduled BP meds if required)    Type 2 diabetes mellitus with chronic kidney disease on chronic dialysis  - BS uncontrolled, Not on any meds  - stroke risk factor    - continue SSI  - f/u HbA1C  - maintain BS between 130s - 150s    Dyslipidemia  - f/u lipid panel  - initiate statins accordingly    ESRD on hemodialysis  - nephro following  - appreciate recs    Diastolic heart failure  - f/u  Echo Low normal to mildly depressed left ventricular systolic function (EF 50-55%) + Left ventricular diastolic dysfunction. + Pulmonary hypertension. The estimated PA systolic pressure is 86 mmHg.     - continue coreg  - hold  lisinopril for CKD issues    Nausea & vomiting  -see section under ICH      Thrombolysis Candidate? No  1. Contraindications: CT findings (ICH, SAH, major infact sign)    Interventional Revascularization Candidate?  hypertensive hemorrhage    Research Candidate? No    Shahid Martinez MD  [facility]  Department of Vascular Neurology   [facility]

## 2017-02-25 NOTE — ASSESSMENT & PLAN NOTE
Acute/subacute left caudate hemorrhage / Secondary to uncontrolled HTN  Antithrombotics for secondary stroke prevention: None: Reason:  Hemorrhagic Stroke, Statins for secondary stroke prevention and hyperlipidemia, if present: None: Reason: Get lipid panel and initiate atorvostatin , Aggressive risk factor modification: Hypertension, Smoking, Diabetes, High Cholesterol and Exercise, Rehab Efforts: Physical Therapy, Occupational Therapy, Speech and Language Pathology and Physical Medicine and Rehabilitation, Diagnostics: Ordered/Pending Lipid Profile to assess cholesterol levels, VTE Prophylaxis: Heparin 5000 units SQ every 8 hours, BP Parameters: --140 considering chornic uncontrolled HTN, DBP  considering chronic uncontrolled HTN, MAP ---, Nursing Orders: Neuro checks- q4hr, Swallowing evaluation by Nursing, Seizure precautions and IV Fluids: No IV Fluids    - Patient MRI reveals sequela of bilateral remote infarcts/hemorrhage and chronic microangiopathic changes.   - close monitoring of BP for avoiding hypotensive episodes or abrupt fall for high risk for hypotensive infarct / watershed infarcts  - Currently on amlodipine and coreg. Holding lisinopril and also shall give consideration for less strict standard BP control/target for ICH patients meds; given the scenario of chronic uncontrolled HTN

## 2017-02-25 NOTE — PLAN OF CARE
SWI visited pt at bedside to verify home address and Home health preference.Pt was unable to answer question regarding home address. The pt did state that he has had home health before but could not recall what agency. Pt asked if SWI had his sister contact number Alicia [phone], which was in the file. SWI asked if she could contact sister on behalf of the patients care. Pt gave mention to do so.    SWI contacted sister regarding pt's home health preference and pt address. Alicia was not clear about what home health agency pt had previously. Pt sister said she had no preference for brother care. SWI asked sister about what address the pt lived at. Alicia explained, that pt previously lived with girlfriend and now has moved in with her daughter (Daisy- [phone]). Due to the daughters recent move she does not know the address, but is currently on her way there. SWI will contact Alicia back in an hour to retrieve patients address so that home health can be set up.       NATALY Fonseca  Social Work Intern

## 2017-02-25 NOTE — ASSESSMENT & PLAN NOTE
- BS uncontrolled, Not on any meds  - stroke risk factor    - continue SSI  - f/u HbA1C  - maintain BS between 130s - 150s

## 2017-02-25 NOTE — SUBJECTIVE & OBJECTIVE
Neurologic Chief Complaint: Acute left caudate hemorrhage    Subjective:     Interval History: Patient is seen for follow-up neurological assessment and treatment recommendations:     No adverse events overnight. No acute neurological deficits.     HPI, Past Medical, Family, and Social History remains the same as documented in the initial encounter.     Review of Systems   Constitutional: Negative for chills and fever.   HENT: Negative for sore throat and trouble swallowing.    Eyes: Negative for visual disturbance.   Respiratory: Negative for shortness of breath.    Cardiovascular: Negative for chest pain and palpitations.   Gastrointestinal: Negative for abdominal distention and abdominal pain.   Musculoskeletal: Negative for joint swelling, neck pain and neck stiffness.   Neurological: Negative for dizziness, tremors, seizures, syncope, facial asymmetry, speech difficulty, light-headedness, numbness and headaches.   Psychiatric/Behavioral: Negative for agitation.     Scheduled Meds:   amlodipine  10 mg Oral QAM    carvedilol  12.5 mg Oral BID    cinacalcet  60 mg Oral Daily with breakfast    heparin (porcine)  5,000 Units Subcutaneous Q8H    lanthanum  1,000 mg Oral TID WM    pantoprazole  40 mg Oral BID    sodium chloride 0.9%  3 mL Intravenous Q8H     Continuous Infusions:   PRN Meds:sodium chloride 0.9%, acetaminophen, dextrose 50%, dextrose 50%, glucagon (human recombinant), glucose, glucose, insulin aspart, labetalol, ondansetron, pneumoc 13-jefferson conj-dip cr(PF), promethazine (PHENERGAN) IVPB, senna-docusate 8.6-50 mg    Objective:     Vital Signs (Most Recent):  Temp: 98 °F (36.7 °C) (02/25/17 0855)  Pulse: 76 (02/25/17 1230)  Resp: 18 (02/25/17 1100)  BP: (!) 167/86 (02/25/17 1230)  SpO2: 98 % (02/25/17 0855)  BP Location: Left arm    Vital Signs Range (Last 24H):  Temp:  [97.8 °F (36.6 °C)-98.6 °F (37 °C)]   Pulse:  [66-78]   Resp:  [11-43]   BP: (101-167)/(62-89)   SpO2:  [97 %-100 %]   Arterial  Line BP: ()/(51-69)   BP Location: Left arm    Physical Exam   Constitutional: No distress.   HENT:   Head: Normocephalic and atraumatic.   Eyes: Pupils are equal, round, and reactive to light.   Neck: Normal range of motion.   Cardiovascular: Normal rate and regular rhythm.    Pulmonary/Chest: Effort normal. No respiratory distress.   Abdominal: Soft. Bowel sounds are normal.   Neurological: GCS eye subscore is 4 - spontaneous. GCS verbal subscore is 5 - oriented. GCS motor subscore is 6 - obeys commands.   Skin: No rash noted. No erythema.   Psychiatric: He has a normal mood and affect.       Neurological Exam:   LOC: alert and follows requests  Language: No aphasia  Speech: No dysarthria  Orientation: Person, Place, Not oriented to time  Memory: Recent memory intact, Age correct, Month correct  Visual Fields (CN II): Full  EOM (CN III, IV, VI): Full/intact  Oculocephalics: normal  Pupils (CN III, IV, VI): PERRL  Facial Sensation (CN V): Symmetric, Corneal reflex intact  Facial Movement (CN VII): symmetric facial expression  Hearing (CN VIII): intact bilaterally  Gag Reflex (CN IX, X): normal/symmetric  Shoulder/Neck (CN XI): SCM-Left: Normal ; SCM-Right: Normal ; Shoulder Shrug: Normal/Symetric  Tongue (CN XII): to midline  Reflexes: flexor plantar responses bilaterally and 2+ throughout  Motor*: Arm Left: Normal (5/5), Leg Left: Plegic (0/5), Arm Right: Normal (5/5), Leg Right: Normal (5/5)  Cerebellar*: L BKA, gait / balance not tested. Normal finger nose testin  Sensation: intact to light touch, temperature and vibration  Tone: Arm-Left: normal; Leg-Left: normal; Arm-Right: normal; Leg-Right: normal    NIH Stroke Scale:    Level of Consciousness: 0 - alert  LOC Questions: 0 - answers both correctly  LOC Commands: 0 - performs both correctly  Best Gaze: 0 - normal  Visual: 0 - no visual loss  Facial Palsy: 0 - normal  Motor Left Arm: 0 - no drift  Motor Right Arm: 0 - no drift  Motor Left Leg: UN -  amputation, joint fusion  Motor Right Le - no drift  Limb Ataxia: UN - amputation, joint fusion  Sensory: 0 - normal  Best Language: 0 - no aphasia  Dysarthria: 0 - normal articulation  Extinction and Inattention: 0 - no neglect  NIH Stroke Scale Total: 0  [place] Coma Scale:  Best Eye Response: 4 - spontaneous  Best Motor Response: 6 - obeys commands  Best Verbal Response: 5 - oriented  [place] Coma Scale Total: 15      Laboratory:  BMP:   Recent Labs  Lab 17  0600      K 4.8   CL 96   CO2 22*   BUN 70*   CREATININE 8.5*   CALCIUM 8.1*     CBC:   Recent Labs  Lab 17  0600   WBC 4.21   RBC 4.02*   HGB 12.2*   HCT 34.9*      MCV 87   MCH 30.3   MCHC 35.0     Lipid Panel: No results for input(s): CHOL, LDLCALC, HDL, TRIG in the last 168 hours.  Hgb A1C: No results for input(s): HGBA1C in the last 168 hours.  TSH:   Recent Labs  Lab 17  1413   TSH 0.572       Diagnostic Results:  CT Head : Stable exam demonstrating small area of acute intraparenchymal hemorrhage within the left caudate head.  No new intracranial hemorrhage or major vascular territory infarct.     MRI Brain : Acute/subacute left caudate hemorrhage, correlating with finding on recent CT.  There is sequela of bilateral remote infarcts/hemorrhage and chronic microangiopathic changes, similar in appearance to the previous MRI.  No evidence of hydrocephalus,   midline shift, or acute infarct.

## 2017-02-25 NOTE — PLAN OF CARE
Problem: Physical Therapy Goal  Goal: Physical Therapy Goal  Goals to be met by: 3/4/17     Patient will increase functional independence with mobility by performin. Supine to sit with Contact Guard Assistance  2. Sit to supine with Contact Guard Assistance  3. Sit to stand transfer with Stand-by Assistance  4. Bed to chair transfer with Stand-by Assistance using Rolling Walker  5. Gait x 50 feet with Contact Guard Assistance using Rolling Walker.   6. Ascend/descend 5 stair with right Handrails Minimal Assistance.   7. Lower extremity exercise program x 20 reps with [place] (15 reps MET )   Outcome: Ongoing (interventions implemented as appropriate)     Pt has met all the above goals except #7. Appropriate to D/C home with HH services  return to baseline mobility per pt report; currently req supervision/SBA. D/C Acute PT services 2017.     Lou Fox, PT, DPT  [phone]  2017

## 2017-02-25 NOTE — PROGRESS NOTES
Unable to dialyze pt per Nephrology orders. Pt transferred out of Neuro-ICU to the 7th floor step-down unit into a semi-private room, not equipped for dialysis. 7th floor Charge Nurse notified & House-Supervisor denied pt's transfer to private room due to limited bed availabilities. Dialysis Charge notified & arrangements were made to dialyze pt in the [facility] as soon as possible.

## 2017-02-25 NOTE — PLAN OF CARE
Problem: Patient Care Overview  Goal: Plan of Care Review  Outcome: Ongoing (interventions implemented as appropriate)  POC reviewed with pt; understanding verbalized. No acute neuro changes noted; vital signs remained stable. Pt remained free from falls.

## 2017-02-25 NOTE — PLAN OF CARE
Rolling walker to be delivered to bedside by [employer].  Sw spoke with On Call liaison , Kyung, and order has been received.      Pt is cleared to discharge after rolling walker delivery.    Mary Fierro LMSW

## 2017-02-25 NOTE — PLAN OF CARE
[facility]    HOME HEALTH ORDERS  FACE TO FACE ENCOUNTER    Patient Name: Vaughn Retana  YOB: 1965    PCP: Ariana Vazquez MD   PCP Address: [address]  PCP Phone Number: [phone]  PCP Fax: [phone]    Encounter Date: 02/25/2017    Admit to Home Health    Diagnoses:  Active Hospital Problems    Diagnosis  POA    *Left-sided nontraumatic intracerebral hemorrhage [I61.9]  Yes     Priority: 1 - High    Nausea & vomiting [R11.2]  Yes    Diastolic heart failure [I50.30]  Yes     Chronic    History of left below knee amputation 12/18/13 [Z89.512]  Not Applicable     Chronic    Dyslipidemia [E78.5]  Yes     Chronic    ESRD on hemodialysis [N18.6, Z99.2]  Not Applicable     Chronic    Type 2 diabetes mellitus with chronic kidney disease on chronic dialysis [E11.22, N18.6, Z99.2]  Not Applicable     Chronic    Essential hypertension [I10]  Yes     Chronic      Resolved Hospital Problems    Diagnosis Date Resolved POA   No resolved problems to display.       No future appointments.        I have seen and examined this patient face to face today. My clinical findings that support the need for the home health skilled services and home bound status are the following:  Weakness/numbness causing balance and gait disturbance due to Stroke making it taxing to leave home.    Allergies:Review of patient's allergies indicates:  No Known Allergies    Diet: renal diet and dental soft    2. perform oral strengthening exercises to improve safe swallow function.  3. Advance trials of regular solids with adequate oral clearance.     Activities: Ambulate with assistance. No specific limitations on activities.     Nursing:   SN to complete comprehensive assessment including routine vital signs. Instruct on disease process and s/s of complications to report to MD. Review/verify medication list sent home with the patient at time of discharge  and instruct  patient/caregiver as needed. Frequency may be adjusted depending on start of care date.    Notify MD if SBP > 160 or < 90; DBP > 90 or < 50; HR > 120 or < 50; Temp > 101      CONSULTS:    Physical Therapy to evaluate and treat. Evaluate for home safety and equipment needs; Establish/upgrade home exercise program. Perform / instruct on therapeutic exercises, gait training, transfer training, and Range of Motion.  Occupational Therapy to evaluate and treat. Evaluate home environment for safety and equipment needs. Perform/Instruct on transfers, ADL training, ROM, and therapeutic exercises.     Patient will increase functional independence with ADLs by performing:     UE Dressing with Stand-by Assistance.  LE Dressing with Stand-by Assistance.  Grooming while standing with Stand-by Assistance.  Toileting from bedside commode with Stand-by Assistance for hygiene and clothing management.   Rolling to Bilateral with Modified [place].   Supine to sit with Modified [place].  Stand pivot transfers with Supervision.                  1. Supine to sit with Contact Guard Assistance  2. Sit to supine with Contact Guard Assistance  3. Sit to stand transfer with Stand-by Assistance  4. Bed to chair transfer with Stand-by Assistance using Rolling Walker  5. Gait x 50 feet with Contact Guard Assistance using Rolling Walker.   6. Ascend/descend 5 stair with right Handrails Minimal Assistance.   7. Lower extremity exercise program x 20 reps with [place]     Patient to be seen recommended days of PT/OTx/week to address the above listed problems via self-care/home management, neuromuscular re-education, cognitive retraining, sensory integration, therapeutic activities, therapeutic exercises      MISCELLANEOUS CARE:  N/A    WOUND CARE ORDERS  n/a      Medications: Review discharge medications with patient and family and provide education.      Current Discharge Medication List      CONTINUE these medications which have NOT  CHANGED    Details   amlodipine (NORVASC) 10 MG tablet Take 1 tablet (10 mg total) by mouth every morning.  Qty: 90 tablet, Refills: 3      carvedilol (COREG) 12.5 MG tablet Take 1 tablet (12.5 mg total) by mouth 2 (two) times daily.  Qty: 60 tablet, Refills: 11      cinacalcet (SENSIPAR) 60 MG Tab Take 1 tablet (60 mg total) by mouth daily with breakfast.  Qty: 30 tablet, Refills: 3      docusate sodium (COLACE) 100 MG capsule Take 1 capsule (100 mg total) by mouth 2 (two) times daily.  Refills: 0      duloxetine (CYMBALTA) 30 MG capsule Take 2 capsules (60 mg total) by mouth once daily.    Associated Diagnoses: Phantom limb pain; Diabetic polyneuropathy associated with type 2 diabetes mellitus      gabapentin (NEURONTIN) 100 MG capsule Take 1 capsule (100 mg total) by mouth 3 (three) times daily.  Qty: 90 capsule, Refills: 3      gabapentin (NEURONTIN) 600 MG tablet Take 1 tablet (600 mg total) by mouth 3 (three) times daily.  Qty: 180 tablet, Refills: 1    Associated Diagnoses: Phantom limb pain; Left leg pain      hydrocodone-acetaminophen 5-325mg (NORCO) 5-325 mg per tablet Take 1 tablet by mouth 2 (two) times daily as needed.  Qty: 60 tablet, Refills: 0    Associated Diagnoses: Phantom limb pain; Chronic midline low back pain with left-sided sciatica      ketoconazole (NIZORAL) 2 % shampoo Wash hair and affected areas of skin with medicated shampoo at least 2x/week - let sit on skin at least 5 minutes prior to rinsing  Qty: 120 mL, Refills: 5    Associated Diagnoses: Pityrosporum folliculitis      lanthanum (FOSRENOL) 1000 MG chewable tablet Take 1 tablet (1,000 mg total) by mouth 3 (three) times daily with meals.  Qty: 60 tablet, Refills: 11    Comments: Please dispense the powder form equivalent to the above sig. The electronic medical record has not updated the pharmacy to include the powder form. Thank you      lisinopril (PRINIVIL,ZESTRIL) 20 MG tablet Take 2 tablets (40 mg total) by mouth once  daily.  Qty: 90 tablet, Refills: 3      pantoprazole (PROTONIX) 40 MG tablet Take 1 tablet (40 mg total) by mouth once daily.  Qty: 30 tablet, Refills: 3      sodium chloride (OCEAN) 0.65 % nasal spray 1 spray by Nasal route 2 (two) times daily.  Refills: 12      tramadol (ULTRAM) 50 mg tablet Take 1 tablet (50 mg total) by mouth 3 (three) times daily as needed for Pain.  Qty: 90 tablet, Refills: 2    Associated Diagnoses: Chronic midline low back pain with left-sided sciatica             I certify that this patient is confined to his home and needs physical therapy, speech therapy and occupational therapy.

## 2017-02-25 NOTE — PROGRESS NOTES
Patient received from floor with report from Toñito Teague.  Maintenance dialysis began per orders via 15 gauge fistula needles to right upper arm fistula.

## 2017-02-25 NOTE — PROGRESS NOTES
Transferred to room 728A via bed without difficulty, VS stable, nurse at bed side, no neuro change

## 2017-02-25 NOTE — ASSESSMENT & PLAN NOTE
Acute/subacute left caudate hemorrhage / Secondary to uncontrolled HTN  Antithrombotics for secondary stroke prevention: None: Reason:  Hemorrhagic Stroke, Statins for secondary stroke prevention and hyperlipidemia, if present: None: Reason: Get lipid panel and initiate atorvostatin , Aggressive risk factor modification: Hypertension, Smoking, Diabetes, High Cholesterol and Exercise, Rehab Efforts: Physical Therapy, Occupational Therapy, Speech and Language Pathology and Physical Medicine and Rehabilitation, Diagnostics: Ordered/Pending Lipid Profile to assess cholesterol levels, VTE Prophylaxis: None: Reason for No Pharmacological VTE Prophylaxis: HIstory of systemic or intracranial bleeding, BP Parameters: --140 considering chornic uncontrolled HTN, DBP  considering chronic uncontrolled HTN, MAP AS PER NCC TEAM-AS PER NCC TEAM, Nursing Orders: Neuro checks- as per NCC team, Swallowing evaluation by Nursing, Seizure precautions and IV Fluids: as per NCC team    - Patient MRI reveals sequela of bilateral remote infarcts/hemorrhage and chronic microangiopathic changes.   - close monitoring of BP for avoiding hypotensive episodes or abrupt fall in BP required. High risk for hypotensive infarct / watershed infarcts  - dose home BP meds accordingly and also consideration to be given for chronic uncontrolled HTN and less strict standard BP control/target for ICH patients   - N/V controlled with IV Reglan / prn zofran

## 2017-02-25 NOTE — ASSESSMENT & PLAN NOTE
- f/u  Echo Low normal to mildly depressed left ventricular systolic function (EF 50-55%) + Left ventricular diastolic dysfunction. + Pulmonary hypertension. The estimated PA systolic pressure is 86 mmHg.     - continue coreg  - hold lisinopril for CKD issues

## 2017-02-25 NOTE — ASSESSMENT & PLAN NOTE
- HTN uncontrolled on admission  - Home meds include amlo, lisinopril and coreg    - currently on amlo / lisinopril  - BP well controlled, with episodes of borderline hypotension noted   - Patient MRI reveals sequela of bilateral remote infarcts/hemorrhage and chronic microangiopathic changes. Hence close monitoring of BP for avoiding hypotensive episodes or abrupt fall in BP required. High risk for hypotensive infarct / watershed infarcts  - dose home BP meds accordingly and also consideration to be given for chronic uncontrolled HTN and less strict standard BP control/target for ICH patients (skip scheduled BP meds if required)

## 2017-02-25 NOTE — SUBJECTIVE & OBJECTIVE
Past Medical History:   Diagnosis Date    Acute on chronic diastolic CHF (congestive heart failure) 9/14/2016    Anemia     Chronic low back pain 12/1/2015    Diabetes mellitus, type II     Diabetic neuropathy     ESRD on hemodialysis     Hepatitis C     states hepatitis B    Hypertension     Left basal ganglia ICH 5/6/2013    Metabolic acidosis, IAG, reduced excretion of inorganic acids     Myoclonic jerking 9/20/2016    Obesity     Secondary hyperparathyroidism (of renal origin)     TB lung, latent 8/25/2015    Thyroid disease      Past Surgical History:   Procedure Laterality Date    COLONOSCOPY      FOOT AMPUTATION THROUGH METATARSAL      left foot    LEG AMPUTATION THROUGH KNEE  12/18/2013    left BKA    R AVF  9/12/12     Family History   Problem Relation Age of Onset    Early death Mother     Kidney disease Father     Hypertension Father     Heart disease Father     Hyperlipidemia Father     Diabetes Brother     Alcohol abuse Maternal Grandmother     Diabetes Maternal Grandfather     Melanoma Neg Hx      Social History   Substance Use Topics    Smoking status: Former Smoker     Packs/day: 1.00     Years: 10.00    Smokeless tobacco: Never Used    Alcohol use No     Review of patient's allergies indicates:  No Known Allergies  Medications: I have reviewed the current medication administration record.    Prescriptions Prior to Admission   Medication Sig Dispense Refill Last Dose    amlodipine (NORVASC) 10 MG tablet Take 1 tablet (10 mg total) by mouth every morning. 90 tablet 3 1/11/2017 at 2100    carvedilol (COREG) 12.5 MG tablet Take 1 tablet (12.5 mg total) by mouth 2 (two) times daily. 60 tablet 11 1/11/2017 at 2100    cinacalcet (SENSIPAR) 60 MG Tab Take 1 tablet (60 mg total) by mouth daily with breakfast. 30 tablet 3 1/11/2017 at 0900    docusate sodium (COLACE) 100 MG capsule Take 1 capsule (100 mg total) by mouth 2 (two) times daily.  0 1/11/2017 at 2100     duloxetine (CYMBALTA) 30 MG capsule Take 2 capsules (60 mg total) by mouth once daily.       gabapentin (NEURONTIN) 100 MG capsule Take 1 capsule (100 mg total) by mouth 3 (three) times daily. 90 capsule 3 1/11/2017 at 2100    gabapentin (NEURONTIN) 600 MG tablet Take 1 tablet (600 mg total) by mouth 3 (three) times daily. 180 tablet 1     hydrocodone-acetaminophen 5-325mg (NORCO) 5-325 mg per tablet Take 1 tablet by mouth 2 (two) times daily as needed. 60 tablet 0     ketoconazole (NIZORAL) 2 % shampoo Wash hair and affected areas of skin with medicated shampoo at least 2x/week - let sit on skin at least 5 minutes prior to rinsing 120 mL 5 11/14/2016    lanthanum (FOSRENOL) 1000 MG chewable tablet Take 1 tablet (1,000 mg total) by mouth 3 (three) times daily with meals. 60 tablet 11 1/11/2017 at 2100    lisinopril (PRINIVIL,ZESTRIL) 20 MG tablet Take 2 tablets (40 mg total) by mouth once daily. 90 tablet 3     pantoprazole (PROTONIX) 40 MG tablet Take 1 tablet (40 mg total) by mouth once daily. 30 tablet 3 11/14/2016    sodium chloride (OCEAN) 0.65 % nasal spray 1 spray by Nasal route 2 (two) times daily.  12 11/14/2016    tramadol (ULTRAM) 50 mg tablet Take 1 tablet (50 mg total) by mouth 3 (three) times daily as needed for Pain. 90 tablet 2        Review of Systems   Constitutional: Negative for chills and fever.   HENT: Negative for sore throat and trouble swallowing.    Eyes: Negative for visual disturbance.   Respiratory: Negative for shortness of breath.    Cardiovascular: Negative for chest pain and palpitations.   Gastrointestinal: Negative for abdominal distention and abdominal pain.   Musculoskeletal: Negative for joint swelling, neck pain and neck stiffness.   Neurological: Negative for dizziness, tremors, seizures, syncope, facial asymmetry, speech difficulty, light-headedness, numbness and headaches.   Psychiatric/Behavioral: Negative for agitation.     Objective:     Vital Signs (Most  Recent):  Temp: 97.8 °F (36.6 °C) (02/24/17 1502)  Pulse: 72 (02/24/17 1802)  Resp: 13 (02/24/17 1802)  BP: 109/77 (02/24/17 1802)  SpO2: 98 % (02/24/17 1802)    Vital Signs Range (Last 24H):  Temp:  [97.7 °F (36.5 °C)-98.8 °F (37.1 °C)]   Pulse:  [65-89]   Resp:  [8-42]   BP: ()/()   SpO2:  [97 %-100 %]   Arterial Line BP: ()/(37-66)     Physical Exam   Constitutional: He appears distressed.   HENT:   Head: Normocephalic and atraumatic.   Eyes: Pupils are equal, round, and reactive to light.   Neck: Normal range of motion.   Cardiovascular: Normal rate and regular rhythm.    Pulmonary/Chest: Effort normal. No respiratory distress.   Abdominal: Soft. Bowel sounds are normal.   Neurological: GCS eye subscore is 4 - spontaneous. GCS verbal subscore is 5 - oriented. GCS motor subscore is 6 - obeys commands.   Skin: No rash noted. No erythema.   Psychiatric: He has a normal mood and affect.       Neurological Exam:   LOC: alert and follows requests  Language: No aphasia  Speech: No dysarthria  Orientation: Person, Place, Not oriented to time  Memory: Recent memory intact, Age correct, Month correct  Visual Fields (CN II): Full  EOM (CN III, IV, VI): Full/intact  Oculocephalics: normal  Pupils (CN III, IV, VI): PERRL  Facial Sensation (CN V): Symmetric, Corneal reflex intact  Facial Movement (CN VII): symmetric facial expression  Hearing (CN VIII): intact bilaterally  Gag Reflex (CN IX, X): normal/symmetric  Shoulder/Neck (CN XI): SCM-Left: Normal ; SCM-Right: Normal ; Shoulder Shrug: Normal/Symetric  Tongue (CN XII): to midline  Reflexes: flexor plantar responses bilaterally and 2+ throughout  Motor*: Arm Left:  Normal (5/5), Leg Left:   Plegic (0/5), Arm Right:   Normal (5/5), Leg Right:   Normal (5/5)  Cerebellar*: L BKA, gait / balance not tested. Normal finger nose testin  Sensation: intact to light touch, temperature and vibration  Tone: Arm-Left: normal; Leg-Left: normal; Arm-Right: normal;  Leg-Right: normal    NIH Stroke Scale:    Level of Consciousness: 0 - alert  LOC Questions: 0 - answers both correctly  LOC Commands: 0 - performs both correctly  Best Gaze: 0 - normal  Visual: 0 - no visual loss  Facial Palsy: 0 - normal  Motor Left Arm: 0 - no drift  Motor Right Arm: 0 - no drift  Motor Left Leg: UN - amputation, joint fusion  Motor Right Le - no drift  Limb Ataxia: UN - amputation, joint fusion  Sensory: 0 - normal  Best Language: 0 - no aphasia  Dysarthria: 0 - normal articulation  Extinction and Inattention: 0 - no neglect  NIH Stroke Scale Total: 0  [place] Coma Scale:  Best Eye Response: 4 - spontaneous  Best Motor Response: 6 - obeys commands  Best Verbal Response: 5 - oriented  [place] Coma Scale Total: 15      Laboratory:  BMP:   Recent Labs  Lab 17  0300      K 4.1   CL 95   CO2 27   BUN 60*   CREATININE 7.2*   CALCIUM 9.4     CBC:   Recent Labs  Lab 17  0300   WBC 4.51   RBC 4.07*   HGB 12.4*   HCT 35.1*      MCV 86   MCH 30.5   MCHC 35.3     Lipid Panel: No results for input(s): CHOL, LDLCALC, HDL, TRIG in the last 168 hours.  Platelet Aggregation Study: No results for input(s): PLTAGG, PLTAGINTERP, PLTAGREGLACO, ADPPLTAGGREG in the last 168 hours.  Hgb A1C: No results for input(s): HGBA1C in the last 168 hours.  TSH:   Recent Labs  Lab 17  1413   TSH 0.572       Diagnostic Results:  Brain Imaging:  CT Head : Stable exam demonstrating small area of acute intraparenchymal hemorrhage within the left caudate head.  No new intracranial hemorrhage or major vascular territory infarct.    MRI Brain : Acute/subacute left caudate hemorrhage, correlating with finding on recent CT.  There is sequela of bilateral remote infarcts/hemorrhage and chronic microangiopathic changes, similar in appearance to the previous MRI.  No evidence of hydrocephalus,   midline shift, or acute infarct.    Cardiac Evaluation:    1 - Low normal to mildly depressed left  ventricular systolic function (EF 50-55%).     2 - Right ventricular enlargement with mildly depressed systolic function.     3 - Left ventricular diastolic dysfunction.     4 - Right atrial enlargement.     5 - Severe tricuspid regurgitation.     6 - Pulmonary hypertension. The estimated PA systolic pressure is 86 mmHg.     7 - Increased central venous pressure.

## 2017-02-25 NOTE — PROGRESS NOTES
[facility]  Vascular Neurology  Comprehensive Stroke Center  Progress Note      Neurologic Chief Complaint: Acute left caudate hemorrhage    Subjective:     Interval History: Patient is seen for follow-up neurological assessment and treatment recommendations:     No adverse events overnight. No acute neurological deficits.     HPI, Past Medical, Family, and Social History remains the same as documented in the initial encounter.     Review of Systems   Constitutional: Negative for chills and fever.   HENT: Negative for sore throat and trouble swallowing.    Eyes: Negative for visual disturbance.   Respiratory: Negative for shortness of breath.    Cardiovascular: Negative for chest pain and palpitations.   Gastrointestinal: Negative for abdominal distention and abdominal pain.   Musculoskeletal: Negative for joint swelling, neck pain and neck stiffness.   Neurological: Negative for dizziness, tremors, seizures, syncope, facial asymmetry, speech difficulty, light-headedness, numbness and headaches.   Psychiatric/Behavioral: Negative for agitation.     Scheduled Meds:   amlodipine  10 mg Oral QAM    carvedilol  12.5 mg Oral BID    cinacalcet  60 mg Oral Daily with breakfast    heparin (porcine)  5,000 Units Subcutaneous Q8H    lanthanum  1,000 mg Oral TID WM    pantoprazole  40 mg Oral BID    sodium chloride 0.9%  3 mL Intravenous Q8H     Continuous Infusions:   PRN Meds:sodium chloride 0.9%, acetaminophen, dextrose 50%, dextrose 50%, glucagon (human recombinant), glucose, glucose, insulin aspart, labetalol, ondansetron, pneumoc 13-jefferson conj-dip cr(PF), promethazine (PHENERGAN) IVPB, senna-docusate 8.6-50 mg    Objective:     Vital Signs (Most Recent):  Temp: 98 °F (36.7 °C) (02/25/17 0855)  Pulse: 76 (02/25/17 1230)  Resp: 18 (02/25/17 1100)  BP: (!) 167/86 (02/25/17 1230)  SpO2: 98 % (02/25/17 0855)  BP Location: Left arm    Vital Signs Range (Last 24H):  Temp:  [97.8 °F (36.6 °C)-98.6 °F (37  °C)]   Pulse:  [66-78]   Resp:  [11-43]   BP: (101-167)/(62-89)   SpO2:  [97 %-100 %]   Arterial Line BP: ()/(51-69)   BP Location: Left arm    Physical Exam   Constitutional: No distress.   HENT:   Head: Normocephalic and atraumatic.   Eyes: Pupils are equal, round, and reactive to light.   Neck: Normal range of motion.   Cardiovascular: Normal rate and regular rhythm.    Pulmonary/Chest: Effort normal. No respiratory distress.   Abdominal: Soft. Bowel sounds are normal.   Neurological: GCS eye subscore is 4 - spontaneous. GCS verbal subscore is 5 - oriented. GCS motor subscore is 6 - obeys commands.   Skin: No rash noted. No erythema.   Psychiatric: He has a normal mood and affect.       Neurological Exam:   LOC: alert and follows requests  Language: No aphasia  Speech: No dysarthria  Orientation: Person, Place, Not oriented to time  Memory: Recent memory intact, Age correct, Month correct  Visual Fields (CN II): Full  EOM (CN III, IV, VI): Full/intact  Oculocephalics: normal  Pupils (CN III, IV, VI): PERRL  Facial Sensation (CN V): Symmetric, Corneal reflex intact  Facial Movement (CN VII): symmetric facial expression  Hearing (CN VIII): intact bilaterally  Gag Reflex (CN IX, X): normal/symmetric  Shoulder/Neck (CN XI): SCM-Left: Normal ; SCM-Right: Normal ; Shoulder Shrug: Normal/Symetric  Tongue (CN XII): to midline  Reflexes: flexor plantar responses bilaterally and 2+ throughout  Motor*: Arm Left: Normal (5/5), Leg Left: Plegic (0/5), Arm Right: Normal (5/5), Leg Right: Normal (5/5)  Cerebellar*: L BKA, gait / balance not tested. Normal finger nose testin  Sensation: intact to light touch, temperature and vibration  Tone: Arm-Left: normal; Leg-Left: normal; Arm-Right: normal; Leg-Right: normal    NIH Stroke Scale:    Level of Consciousness: 0 - alert  LOC Questions: 0 - answers both correctly  LOC Commands: 0 - performs both correctly  Best Gaze: 0 - normal  Visual: 0 - no visual loss  Facial Palsy: 0 -  normal  Motor Left Arm: 0 - no drift  Motor Right Arm: 0 - no drift  Motor Left Leg: UN - amputation, joint fusion  Motor Right Le - no drift  Limb Ataxia: UN - amputation, joint fusion  Sensory: 0 - normal  Best Language: 0 - no aphasia  Dysarthria: 0 - normal articulation  Extinction and Inattention: 0 - no neglect  NIH Stroke Scale Total: 0  [place] Scale:  Best Eye Response: 4 - spontaneous  Best Motor Response: 6 - obeys commands  Best Verbal Response: 5 - oriented  [place] Coma Scale Total: 15      Laboratory:  BMP:   Recent Labs  Lab 17  0600      K 4.8   CL 96   CO2 22*   BUN 70*   CREATININE 8.5*   CALCIUM 8.1*     CBC:   Recent Labs  Lab 17  0600   WBC 4.21   RBC 4.02*   HGB 12.2*   HCT 34.9*      MCV 87   MCH 30.3   MCHC 35.0     Lipid Panel: No results for input(s): CHOL, LDLCALC, HDL, TRIG in the last 168 hours.  Hgb A1C: No results for input(s): HGBA1C in the last 168 hours.  TSH:   Recent Labs  Lab 17  1413   TSH 0.572       Diagnostic Results:  CT Head : Stable exam demonstrating small area of acute intraparenchymal hemorrhage within the left caudate head.  No new intracranial hemorrhage or major vascular territory infarct.     MRI Brain : Acute/subacute left caudate hemorrhage, correlating with finding on recent CT.  There is sequela of bilateral remote infarcts/hemorrhage and chronic microangiopathic changes, similar in appearance to the previous MRI.  No evidence of hydrocephalus,   midline shift, or acute infarct.    Assessment/Plan:     Over the hospital stay, patient was managed in NCC. Intracerebral hemorrhage remained stabe on f/u CT. MRI revealed bilateral remote infarcts/hemorrhage and chronic microangiopathic changes. Patient's nausea/vomiting resolved with scheduled IV reglan and PRN zofran and phenergan. Nephro on-board for dialysis.         * Left-sided nontraumatic intracerebral hemorrhage  Acute/subacute left caudate hemorrhage /  Secondary to uncontrolled HTN  Antithrombotics for secondary stroke prevention: None: Reason:  Hemorrhagic Stroke, Statins for secondary stroke prevention and hyperlipidemia, if present: None: Reason: Get lipid panel and initiate atorvostatin , Aggressive risk factor modification: Hypertension, Smoking, Diabetes, High Cholesterol and Exercise, Rehab Efforts: Physical Therapy, Occupational Therapy, Speech and Language Pathology and Physical Medicine and Rehabilitation, Diagnostics: Ordered/Pending Lipid Profile to assess cholesterol levels, VTE Prophylaxis: Heparin 5000 units SQ every 8 hours, BP Parameters: --140 considering chornic uncontrolled HTN, DBP  considering chronic uncontrolled HTN, MAP ---, Nursing Orders: Neuro checks- q4hr, Swallowing evaluation by Nursing, Seizure precautions and IV Fluids: No IV Fluids    - Patient MRI reveals sequela of bilateral remote infarcts/hemorrhage and chronic microangiopathic changes.   - close monitoring of BP for avoiding hypotensive episodes or abrupt fall for high risk for hypotensive infarct / watershed infarcts  - Currently on amlodipine and coreg. Holding lisinopril and also shall give consideration for less strict standard BP control/target for ICH patients meds; given the scenario of chronic uncontrolled HTN       Essential hypertension  - HTN uncontrolled on admission  - Home meds include amlo, lisinopril and coreg    - currently on amlo / coreg. Holding lisinopril   - BP well controlled, with episodes of borderline hypotension noted   - Patient MRI reveals sequela of bilateral remote infarcts/hemorrhage and chronic microangiopathic changes. Hence closely monitoring BP for avoiding hypotensive episodes or abrupt fall. High risk for hypotensive infarct / watershed infarcts. Dosing home BP meds accordingly and also consideration given given for chronic uncontrolled HTN and less strict standard BP control/target for ICH patients (skip scheduled BP meds if  required)    Type 2 diabetes mellitus with chronic kidney disease on chronic dialysis  - BS uncontrolled, Not on any meds  - stroke risk factor    - continue SSI  - f/u HbA1C  - maintain BS between 130s - 150s    ESRD on hemodialysis  - nephro following  - Getting dialysis today  - appreciate recs    History of left below knee amputation 12/18/13  - no current issues    Diastolic heart failure  - f/u  Echo Low normal to mildly depressed left ventricular systolic function (EF 50-55%) + Left ventricular diastolic dysfunction. + Pulmonary hypertension. The estimated PA systolic pressure is 86 mmHg.     - continue coreg  - hold lisinopril ? ZAINAB on CKD    Nausea & vomiting  - N/V on admission  - controlled on prn zofran / Reglan   - see section under ICH      Shahid Martinez MD  Comprehensive Stroke Center  Department of Vascular Neurology   [facility]

## 2017-02-25 NOTE — PLAN OF CARE
SW informed that pt is scheduled to discharge on today and will need home health and a rolling walker.  Sw will fax orders and arrange rolling walker for a room delivery once orders are entered into epic.     Pt's sister made aware of discharge plans and will provide transport.  Pt is to call his sister once his walker arrives.    Mary Fierro LMSW

## 2017-02-25 NOTE — ASSESSMENT & PLAN NOTE
- HTN uncontrolled on admission  - Home meds include amlo, lisinopril and coreg    - currently on amlo / coreg. Holding lisinopril   - BP well controlled, with episodes of borderline hypotension noted   - Patient MRI reveals sequela of bilateral remote infarcts/hemorrhage and chronic microangiopathic changes. Hence closely monitoring BP for avoiding hypotensive episodes or abrupt fall. High risk for hypotensive infarct / watershed infarcts. Dosing home BP meds accordingly and also consideration given given for chronic uncontrolled HTN and less strict standard BP control/target for ICH patients (skip scheduled BP meds if required)

## 2017-02-25 NOTE — PT/OT/SLP PROGRESS
"Physical Therapy  Treatment/Discharge Summary    Vaughn Retana   MRN: [id number]   Admitting Diagnosis: Left-sided nontraumatic intracerebral hemorrhage    PT Received On: 17  PT Start Time: 1515     PT Stop Time: 1530    PT Total Time (min): 15 min       Billable Minutes:  Gait Training 10; TA 5 min    Treatment Type: Treatment  PT/PTA: PT           General Precautions: Standard, fall    Do you have any cultural, spiritual, Mandaeism conflicts, given your current situation?: none stated    Subjective:  Communicated with RN (Ekaterina) prior to session.    "Oh, I am not going to Rehab. I thought they were going to let me go home today." pt states "You don't have to hold onto me, I am just fine. I am back to normal now. I need to get home tonight."    Pain Ratin/10  Pain Rating Post-Intervention: 0/10    Objective:   Patient found with: telemetry    Functional Mobility:  Bed Mobility:   Rolling/Turning Right: Independent  Scooting/Bridging: Independent  Supine to Sit: Independent  Sit to Supine:  (pt remained seated up in chair)    Transfers:  Sit <> Stand Assistance: Supervision, Independent (from EOB x2 trials)  Sit <> Stand Assistive Device: No Assistive Device  Bed <> Chair Technique: Stand Pivot  Bed <> Chair Assistance: Independent  Bed <> Chair Assistive Device: No Assistive Device    Gait:   Gait Distance: x200 feet in hallway; no overt LOB; pt with natural increase sway 2/2 BKA, however, pt reports baseline gait and no fear of falling. Pt declined use of RW; however, requesting one for D/C home.  Assistance 1: Stand by Assistance, Contact Guard Assistance  Gait Assistive Device: No device  Gait Pattern: swing-through gait  Gait Deviation(s): decreased ronnie, decreased toe-to-floor clearance, lateral lean, hip circumduction, hip hike    Stairs:  Pt ascended/descend 1 flight(s) with No Assistive Device with left and right with Stand-by Assistance and Contact Guard Assistance.  Pt with single mis-step " "req CGA for balance recovery; however, pt reports "I knew that was going to happen. You don't have to hold on to me now."    Balance:   Static Sit: GOOD: Takes MODERATE challenges from all directions  Dynamic Sit: GOOD-: Maintains balance through MODERATE excursions of active trunk movement,     Static Stand: FAIR: Maintains without assist but unable to take challenges  Dynamic stand: FAIR+: Needs CLOSE SUPERVISION during gait and is able to right self with minor LOB     Therapeutic Activities and Exercises:  PT arrived to pt's room to find pt resting quietly; agreeable to ambulation. Pt able to don prosthetic with (I) with sitting EOB; pt also able to don sock and shoe to R LE without assist and with good technique. Pt came to standing x2 trials to ensure appropriate fit of prosthetic. Pt with no reports of discomfort, dizziness, light-headedness upon standing. Pt ambulated in hallway and performed stairs as above - pt requesting D/C today if PT in agreement. PT alerted stroke team of pt's performance and need for RW upon D/C. Questions/concerns addressed within PT scope of practice.     AM-PAC 6 CLICK MOBILITY  How much help from another person does this patient currently need?   1 = Unable, Total/Dependent Assistance  2 = A lot, Maximum/Moderate Assistance  3 = A little, Minimum/Contact Guard/Supervision  4 = None, Modified [place]/Independent    Turning over in bed (including adjusting bedclothes, sheets and blankets)?: 4  Sitting down on and standing up from a chair with arms (e.g., wheelchair, bedside commode, etc.): 4  Moving from lying on back to sitting on the side of the bed?: 4  Moving to and from a bed to a chair (including a wheelchair)?: 4  Need to walk in hospital room?: 3  Climbing 3-5 steps with a railing?: 3  Total Score: 22    AM-PAC Raw Score CMS G-Code Modifier Level of Impairment Assistance   6 % Total / Unable   7 - 9 CM 80 - 100% Maximal Assist   10 - 14 CL 60 - 80% Moderate " Assist   15 - 19 CK 40 - 60% Moderate Assist   20 - 22 CJ 20 - 40% Minimal Assist   23 CI 1-20% SBA / CGA   24 CH 0% Independent/ Mod I     Patient left up in chair with all lines intact, call button in reach, RN notified and RN present.    Assessment:  Vaughn Retana is a 51 y.o. male with a medical diagnosis of Left-sided nontraumatic intracerebral hemorrhage and presents with significant improvement in functional mobility and activity tolerance. Pt currently (I) with bed mobility and transfers to chair; pt (I) with donning/doffing prosthetic; pt req SBA for stairs. Pt appropriate for D/C home with  services. Pt to D/C this PM. D/C Acute PT services 2017.    Rehab identified problem list/impairments: Rehab identified problem list/impairments: impaired endurance, impaired self care skills, gait instability, impaired balance    Rehab potential is good.    Activity tolerance: Good    Discharge recommendations: Discharge Facility/Level Of Care Needs: home health PT     Barriers to discharge: Barriers to Discharge: None    Equipment recommendations: Equipment Needed After Discharge: walker, rolling     GOALS:   Physical Therapy Goals        Problem: Physical Therapy Goal    Goal Priority Disciplines Outcome Goal Variances Interventions   Physical Therapy Goal     PT/OT, PT Ongoing (interventions implemented as appropriate)     Description:  Goals to be met by: 3/4/17     Patient will increase functional independence with mobility by performin. Supine to sit with Contact Guard Assistance  2. Sit to supine with Contact Guard Assistance  3. Sit to stand transfer with Stand-by Assistance  4. Bed to chair transfer with Stand-by Assistance using Rolling Walker  5. Gait  x 50 feet with Contact Guard Assistance using Rolling Walker.   6. Ascend/descend 5 stair with right Handrails Minimal Assistance.   7. Lower extremity exercise program x 20 reps with [place] (15 reps MET )               PLAN:    D/C  Acute PT services 2/25/2017. Home with HH; pt's sister to pick pt up this PM.  Plan of Care reviewed with: patient     Lou Fox, PT, DPT  [phone]  2/25/2017

## 2017-02-25 NOTE — PLAN OF CARE
NATALY contacted [facility] [phone] and spoke to Allyn regarding patients pending discharge. NATALY set up home health and faxed face sheet, orders, and H& P notes to facility.    NATALY contacted sister Alicia about daughters address that pt will be discharging to. NATALY was placed on hold to speak to Daisy and the address given was as follows: [address] [place] [address],[facility] [id number].    NATALY  contacted Allyn at [facility] and gave address for pt.       NATALY Fonseca  Social Work Intern

## 2017-02-25 NOTE — PROGRESS NOTES
I personally saw and examined the patient on hemodialysis, tolerating treatment, see hemodyalisis flowsheet. I also reviewed the chart and current medication. The dialysis bath was adjusted.   Patient with prominent pulmonary vascularity on CXR - will aim for total net UF of 2.5 liter today. BP within normal limits.

## 2017-02-26 NOTE — PLAN OF CARE
Problem: Patient Care Overview  Goal: Plan of Care Review  Outcome: Ongoing (interventions implemented as appropriate)  Dialysis completed. Needles removed from right upper arm fistula with pressure held to needle sites for 7 minutes each with hemostasis achieved.Gauze and tape to sites. Patient dialyzed for 3.5 hours with fluid removal of 2.5 liters. Tolerated well with stable vital signs. Report called to floor to Toñito Teague.

## 2017-02-26 NOTE — NURSING
Patient is discharged home with all belongings in hand.  Patient's left wrist and forearm IV was removed with the tip intact.

## 2017-02-26 NOTE — DISCHARGE SUMMARY
DISCHARGE SUMMARY  Vascular Neurology     Team: Networked reference to record PCT     Patient Name: Vaughn Retana  YOB: 1965    Admit Date: 2/22/2017    Discharge Date: 02/25/2017    Discharge Attending Physician: Lul Zepeda MD     Diagnoses:  Active Hospital Problems    Diagnosis  POA    *Left-sided nontraumatic intracerebral hemorrhage [I61.9]  Yes     Priority: 1 - High    Nausea & vomiting [R11.2]  Yes    Diastolic heart failure [I50.30]  Yes     Chronic    History of left below knee amputation 12/18/13 [Z89.512]  Not Applicable     Chronic    Dyslipidemia [E78.5]  Yes     Chronic    ESRD on hemodialysis [N18.6, Z99.2]  Not Applicable     Chronic    Type 2 diabetes mellitus with chronic kidney disease on chronic dialysis [E11.22, N18.6, Z99.2]  Not Applicable     Chronic    Essential hypertension [I10]  Yes     Chronic      Resolved Hospital Problems    Diagnosis Date Resolved POA   No resolved problems to display.       Discharged Condition: admit problems have stabilized     HOSPITAL COURSE:    Initial Presentation:     Vaughn Retana is a 51 y.o. male with previous L basal ganglia ICH 9/16, anemia, ?DMII, Hepatitis C, HTN (medication non-compliance), acute on chronic diastolic CHF, L BKA who presents to [facility] ED for evaluation of HA, nausea, and vomiting x 2 days.     Course of Principle Problem for Admission:    No focal neurological deficits on admission. BP at presentation 208/117. Found to have small L caudate bleed on CT with several additional small hemorrhagic foci, hence admitted initially to Neuro critical care for close monitoring. [place] Coma Scale Total: 15 and NIH Stroke Scale Total: 0.     Vascular neurology was on board for management. Neurosurgery was consulted and recommended no active interventions considering the bleed size and stable symptoms. Hence, conservative management with no antiplatelets, or antithrombotics and anti-emetics for N/V was carried.  Repeat CT revealed stable exam demonstrating small area of acute intraparenchymal hemorrhage within the left caudate head, with no new intracranial hemorrhage or major vascular territory infarct. MRA ruled any evidence of hemodynamically significant stenosis, occlusion, AV malformation, or aneurysm of the anterior or posterior circulation. MRI revealed sequela of bilateral remote infarcts/hemorrhage and chronic microangiopathic changes. Hence closely monitored BP for avoiding hypotensive episodes or abrupt fall for high risk for hypotensive infarct / watershed infarcts. Dosed home BP meds accordingly and also consideration was given for chronic uncontrolled HTN and less strict standard BP control/target for ICH patients (skip scheduled BP meds if required). Patient was underwent dialysis during the hospital stay. PT/OT were on-board, who recommended home health discharge instructions. Patient remained hemodynamically stable, with no focal neurological deficit through the hospital stay hence discharge with f/u appointments to vascular neurology and his PCP.      Other Medical Problems Addressed in the Hospital:    Essential hypertension  - HTN uncontrolled on admission  - Home meds include amlo, lisinopril and coreg     - currently on amlo / coreg. Held lisinopril transiently    - BP well controlled, with episodes of borderline hypotension during hospital stay  - Patient MRI reveals sequela of bilateral remote infarcts/hemorrhage and chronic microangiopathic changes. Hence closely monitored BP for avoiding hypotensive episodes or abrupt fall for high risk for hypotensive infarct / watershed infarcts.  - Dosed home BP meds accordingly and also consideration given given for chronic uncontrolled HTN and less strict standard BP control/target for ICH patients (skip scheduled BP meds if required)     Type 2 diabetes mellitus with chronic kidney disease on chronic dialysis  - BS uncontrolled on admission, Not on any  meds  - Patient denied the diagnosis      - managed on SSI  - f/u HbA1C  - maintained BS between 130s - 150s  - f/u PCP on discharge     ESRD on hemodialysis  - nephro followed  - Received dialysis today  - shall hold on to his regular schedule on discharge      History of left below knee amputation 12/18/13  - Stable. No acute issues to address     Diastolic heart failure  - f/u  Echo Low normal to mildly depressed left ventricular systolic function (EF 50-55%) + Left ventricular diastolic dysfunction. + Pulmonary hypertension. The estimated PA systolic pressure is 86 mmHg.      - continue coreg  - hold lisinopril ? ZAINAB on CKD  - D/c on lisinopril      Nausea & vomiting  - N/V on admission  - controlled on prn zofran / Reglan     CONSULTS:   Neurosurgery:  - No active interventions  - SBP<160.  - Medical management otherwise per NCC.    Nephrology:   - ESRD (MWF)  -[facility], last HD on Monday  -mild hyperkalemia noted on Renal panel at 5.2  -performed 3 hour HD treatment today  -UF 1-2L     Cardiology:   Severe Tricuspid Valve regurgitation and Pulm HTN  - recommended volume removal via HD  - HTN management per primary with goal of 120/70  - No further cardiac interventions at this time.    Other Pertinent Lab Findings:      POCT Glucose  216 168 210 126     Coumadin Monitoring INR 1.3         Pertinent/Significant Diagnostic Studies:      CT Head 2/22: Stable exam demonstrating small area of acute intraparenchymal hemorrhage within the left caudate head.  No new intracranial hemorrhage or major vascular territory infarct.      MRI Brain 2/23: Acute/subacute left caudate hemorrhage, correlating with finding on recent CT.  There is sequela of bilateral remote infarcts/hemorrhage and chronic microangiopathic changes, similar in appearance to the previous MRI.  No evidence of hydrocephalus,   midline shift, or acute infarct.    Special Treatments/Procedures: N/A    Disposition:  Home with Home Health       Future Scheduled Appointments:  Vascular Neurology in 3-4 weeks    Last CBC/BMP/HgbA1c (if applicable):  Recent Results (from the past 336 hour(s))   CBC auto differential    Collection Time: 02/25/17  6:00 AM   Result Value Ref Range    WBC 4.21 3.90 - 12.70 K/uL    Hemoglobin 12.2 (L) 14.0 - 18.0 g/dL    Hematocrit 34.9 (L) 40.0 - 54.0 %    Platelets 151 150 - 350 K/uL   CBC auto differential    Collection Time: 02/24/17  3:00 AM   Result Value Ref Range    WBC 4.51 3.90 - 12.70 K/uL    Hemoglobin 12.4 (L) 14.0 - 18.0 g/dL    Hematocrit 35.1 (L) 40.0 - 54.0 %    Platelets 161 150 - 350 K/uL   CBC auto differential    Collection Time: 02/23/17  1:36 AM   Result Value Ref Range    WBC 4.49 3.90 - 12.70 K/uL    Hemoglobin 12.7 (L) 14.0 - 18.0 g/dL    Hematocrit 36.1 (L) 40.0 - 54.0 %    Platelets 142 (L) 150 - 350 K/uL     No results found for this or any previous visit (from the past 336 hour(s)).  Lab Results   Component Value Date    HGBA1C text 04/01/2012       Discharge Medication List:     Medication List      CHANGE how you take these medications          gabapentin 600 MG tablet   Commonly known as:  NEURONTIN   Take 1 tablet (600 mg total) by mouth 3 (three) times daily.   What changed:  Another medication with the same name was removed. Continue taking this medication, and follow the directions you see here.         CONTINUE taking these medications          amlodipine 10 MG tablet   Commonly known as:  NORVASC   Take 1 tablet (10 mg total) by mouth every morning.       carvedilol 12.5 MG tablet   Commonly known as:  COREG   Take 1 tablet (12.5 mg total) by mouth 2 (two) times daily.       cinacalcet 60 MG Tab   Commonly known as:  SENSIPAR   Take 1 tablet (60 mg total) by mouth daily with breakfast.       docusate sodium 100 MG capsule   Commonly known as:  COLACE   Take 1 capsule (100 mg total) by mouth 2 (two) times daily.       duloxetine 30 MG capsule   Commonly known as:  CYMBALTA   Take 2  "capsules (60 mg total) by mouth once daily.       hydrocodone-acetaminophen 5-325mg 5-325 mg per tablet   Commonly known as:  NORCO   Take 1 tablet by mouth 2 (two) times daily as needed.       ketoconazole 2 % shampoo   Commonly known as:  NIZORAL   Wash hair and affected areas of skin with medicated shampoo at least 2x/week - let sit on skin at least 5 minutes prior to rinsing       lanthanum 1000 MG chewable tablet   Commonly known as:  FOSRENOL   Take 1 tablet (1,000 mg total) by mouth 3 (three) times daily with meals.       lisinopril 20 MG tablet   Commonly known as:  PRINIVIL,ZESTRIL   Take 2 tablets (40 mg total) by mouth once daily.       pantoprazole 40 MG tablet   Commonly known as:  PROTONIX   Take 1 tablet (40 mg total) by mouth once daily.       sodium chloride 0.65 % nasal spray   Commonly known as:  OCEAN   1 spray by Nasal route 2 (two) times daily.       tramadol 50 mg tablet   Commonly known as:  ULTRAM   Take 1 tablet (50 mg total) by mouth 3 (three) times daily as needed for Pain.             Patient Instructions:    Discharge Procedure Orders  WALKER FOR HOME USE   Order Specific Question Answer Comments   Type of Walker: Adult (5'4"-6'6")    With wheels? Yes    Height: 5' 6" (1.676 m)    Weight: 71.8 kg (158 lb 4.6 oz)    Length of need (1-99 months): 99    Does patient have medical equipment at home? prosthesis    Please check all that apply: Patient is unable to safely ambulate without equipment.      Ambulatory Referral to Vascular Neurology   Referral Priority: Routine Referral Type: Consultation   Referral Reason: Specialty Services Required    Requested Specialty: Vascular Neurology    Number of Visits Requested: 1      Diet general     Call MD for:  temperature >100.4     Call MD for:  persistent nausea and vomiting or diarrhea     Call MD for:  redness, tenderness, or signs of infection (pain, swelling, redness, odor or green/yellow discharge around incision site)     Call MD for:  " severe persistent headache     Call MD for:  persistent dizziness, light-headedness, or visual disturbances     Call MD for:  increased confusion or weakness         Signing Physician:  Shahid Martinez MD

## 2017-02-27 LAB — HBV SURFACE AG SERPL QL IA: NEGATIVE

## 2017-02-27 NOTE — PLAN OF CARE
Problem: Occupational Therapy Goal  Goal: Occupational Therapy Goal  Goals to be met by: 3/2     Patient will increase functional independence with ADLs by performing:    UE Dressing with Stand-by Assistance. - not met  LE Dressing with Stand-by Assistance. - not met  Grooming while standing with Stand-by Assistance. - not met  Toileting from bedside commode with Stand-by Assistance for hygiene and clothing management. - not met  Rolling to Bilateral with Modified [place]. - not met  Supine to sit with Modified [place]. - not met  Stand pivot transfers with Supervision. - not met   Outcome: Outcome(s) achieved Date Met:  02/27/17  No goals met.  Hospital discharge.

## 2017-02-27 NOTE — PT/OT/SLP DISCHARGE
Occupational Therapy Discharge Summary    Vaughn Retana  MRN: [id number]   Left-sided nontraumatic intracerebral hemorrhage   Patient Discharged from acute Occupational Therapy on 2/27/17.  Please refer to prior OT note dated on 2/24/17 for functional status.     Assessment:   Patient has not met goals.  GOALS:   Occupational Therapy Goals     Not on file      Multidisciplinary Problems (Resolved)        Problem: Occupational Therapy Goal    Goal Priority Disciplines Outcome Interventions   Occupational Therapy Goal   (Resolved)     OT, PT/OT Outcome(s) achieved    Description:  Goals to be met by: 3/2     Patient will increase functional independence with ADLs by performing:    UE Dressing with Stand-by Assistance. - not met  LE Dressing with Stand-by Assistance. - not met  Grooming while standing with Stand-by Assistance. - not met  Toileting from bedside commode with Stand-by Assistance for hygiene and clothing management. - not met  Rolling to Bilateral with Modified [place]. - not met  Supine to sit with Modified [place]. - not met  Stand pivot transfers with Supervision. - not met                Reasons for Discontinuation of Therapy Services  Transfer to alternate level of care.      Plan:  Patient Discharged to: Home with Home Health Service.    WILBUR Mitchell 2/27/2017

## 2017-03-03 ENCOUNTER — TELEPHONE (OUTPATIENT)
Dept: NEUROSURGERY | Facility: HOSPITAL | Age: 52
End: 2017-03-03

## 2017-03-03 NOTE — TELEPHONE ENCOUNTER
"Attempted to contact patient via number on file.  No answer.  The following message was left for patient to return call "Hello.  This is a message for  Vaughn Mazin.  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  If you could give me call back at [phone] between the hours of 08:00 AM and 04:00 PM, Monday through Friday.  Thanks so much and have a great day."  Will try again later.   "

## 2017-03-07 ENCOUNTER — TELEPHONE (OUTPATIENT)
Dept: NEUROSURGERY | Facility: HOSPITAL | Age: 52
End: 2017-03-07

## 2017-03-07 DIAGNOSIS — Z99.2 DIALYSIS PATIENT: Primary | ICD-10-CM

## 2017-03-07 NOTE — TELEPHONE ENCOUNTER
"Attempted to contact patient via number on file.  No answer.  The following message was left for patient to return call "Hello.  This is a message for  Vaughn Mazin.  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  If you could give me call back at [phone] between the hours of 08:00 AM and 04:00 PM, Monday through Friday.  Thanks so much and have a great day."  Will try again later.   "

## 2017-03-08 ENCOUNTER — TELEPHONE (OUTPATIENT)
Dept: NEUROSURGERY | Facility: HOSPITAL | Age: 52
End: 2017-03-08

## 2017-03-08 ENCOUNTER — TELEPHONE (OUTPATIENT)
Dept: ENDOSCOPY | Facility: HOSPITAL | Age: 52
End: 2017-03-08

## 2017-03-08 DIAGNOSIS — Z12.11 SPECIAL SCREENING FOR MALIGNANT NEOPLASMS, COLON: Primary | ICD-10-CM

## 2017-03-08 RX ORDER — POLYETHYLENE GLYCOL 3350, SODIUM SULFATE ANHYDROUS, SODIUM BICARBONATE, SODIUM CHLORIDE, POTASSIUM CHLORIDE 236; 22.74; 6.74; 5.86; 2.97 G/4L; G/4L; G/4L; G/4L; G/4L
4 POWDER, FOR SOLUTION ORAL ONCE
Qty: 4000 ML | Refills: 0 | Status: SHIPPED | OUTPATIENT
Start: 2017-03-08 | End: 2017-03-08

## 2017-03-08 NOTE — TELEPHONE ENCOUNTER
"Attempted to contact patient via number on file.  No answer.  The following message was left for patient to return call "Hello.  This is a message for  Vaughn Mazin.  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  If you could give me call back at [phone] between the hours of 08:00 AM and 04:00 PM, Monday through Friday.  Thanks so much and have a great day."  Third unsuccessful attempt.   "

## 2017-03-08 NOTE — TELEPHONE ENCOUNTER
Patient is scheduled for EGD & Colonoscopy 4/4/2017 with Dr. Stern.  Instructions sent via mail.  Prep used: PEG.  HD patient at [facility], called [phone] and spoke to Elizabeth, reported date and time of procedure.  K+ lab ordered prior to procedure.  Patient is on the 2nd floor due to PA systolic pressure of 85.56.

## 2017-03-09 ENCOUNTER — TELEPHONE (OUTPATIENT)
Dept: INTERNAL MEDICINE | Facility: CLINIC | Age: 52
End: 2017-03-09

## 2017-03-09 DIAGNOSIS — G81.94 LEFT HEMIPARESIS: Primary | ICD-10-CM

## 2017-03-09 NOTE — TELEPHONE ENCOUNTER
----- Message from Giovanna Lara sent at 3/9/2017  3:11 PM CST -----  Contact: Pt Evelyn Pfeiffer Occupation Therapist from [facility] can be [phone]  Evelyn is requesting orders for a shower chair.      Thank you!

## 2017-03-09 NOTE — TELEPHONE ENCOUNTER
Attempted to contact anup with [place] Occupational Therapist to get more detail about pt request for shower chair, no answer. Left message to call back. Please advise.

## 2017-03-20 ENCOUNTER — OFFICE VISIT (OUTPATIENT)
Dept: NEUROLOGY | Facility: CLINIC | Age: 52
End: 2017-03-20
Payer: MEDICARE

## 2017-03-20 VITALS
HEIGHT: 66 IN | SYSTOLIC BLOOD PRESSURE: 158 MMHG | HEART RATE: 91 BPM | DIASTOLIC BLOOD PRESSURE: 89 MMHG | BODY MASS INDEX: 26.26 KG/M2 | WEIGHT: 163.38 LBS

## 2017-03-20 DIAGNOSIS — Z99.2 TYPE 2 DIABETES MELLITUS WITH CHRONIC KIDNEY DISEASE ON CHRONIC DIALYSIS, WITHOUT LONG-TERM CURRENT USE OF INSULIN: Chronic | ICD-10-CM

## 2017-03-20 DIAGNOSIS — Z86.79 HISTORY OF SPONTANEOUS INTRAPARENCHYMAL INTRACRANIAL HEMORRHAGE ASSOCIATED WITH HYPERTENSION: Chronic | ICD-10-CM

## 2017-03-20 DIAGNOSIS — N18.6 TYPE 2 DIABETES MELLITUS WITH CHRONIC KIDNEY DISEASE ON CHRONIC DIALYSIS, WITHOUT LONG-TERM CURRENT USE OF INSULIN: Chronic | ICD-10-CM

## 2017-03-20 DIAGNOSIS — I61.0 NONTRAUMATIC SUBCORTICAL HEMORRHAGE OF LEFT CEREBRAL HEMISPHERE: Primary | ICD-10-CM

## 2017-03-20 DIAGNOSIS — Z99.2 ESRD ON HEMODIALYSIS: Chronic | ICD-10-CM

## 2017-03-20 DIAGNOSIS — E11.22 TYPE 2 DIABETES MELLITUS WITH CHRONIC KIDNEY DISEASE ON CHRONIC DIALYSIS, WITHOUT LONG-TERM CURRENT USE OF INSULIN: Chronic | ICD-10-CM

## 2017-03-20 DIAGNOSIS — I10 ESSENTIAL HYPERTENSION: Chronic | ICD-10-CM

## 2017-03-20 DIAGNOSIS — N18.6 ESRD ON HEMODIALYSIS: Chronic | ICD-10-CM

## 2017-03-20 PROCEDURE — 99213 OFFICE O/P EST LOW 20 MIN: CPT | Mod: PBBFAC | Performed by: PSYCHIATRY & NEUROLOGY

## 2017-03-20 PROCEDURE — 99214 OFFICE O/P EST MOD 30 MIN: CPT | Mod: S$PBB,,, | Performed by: PSYCHIATRY & NEUROLOGY

## 2017-03-20 PROCEDURE — 99999 PR PBB SHADOW E&M-EST. PATIENT-LVL III: CPT | Mod: PBBFAC,,, | Performed by: PSYCHIATRY & NEUROLOGY

## 2017-03-20 NOTE — MR AVS SNAPSHOT
[facility]  [address]  [place] [address]  Phone: [phone]                  Vaughn Retana   3/20/2017 9:00 AM   Office Visit    Description:  Male : 1965   Provider:  Lul Zepeda MD   Department:  [facility]                To Do List           Future Appointments        Provider Department Dept Phone    3/23/2017 2:40 PM Concepcion Baledras MD [facility] - Internal Medicine [phone]    2017 7:10 AM LAB, APPOINTMENT [place] [facility] [phone]      Your Future Surgeries/Procedures     2017   Surgery with Walker Stern MD   [facility])    [address] [facility] [address] [address] [phone]              Goals (5 Years of Data)     None      [facility]     [facility] -  Assistance  Registered nurses in the [facility] provide clinical advisement, health education, appointment booking, and other advisory services.  Call for this free service at [phone].             Medications           Message regarding Medications     Verify the changes and/or additions to your medication regime listed below are the same as discussed with your clinician today.  If any of these changes or additions are incorrect, please notify your healthcare provider.             Verify that the below list of medications is an accurate representation of the medications you are currently taking.  If none reported, the list may be blank. If incorrect, please contact your healthcare provider. Carry this list with you in case of emergency.           Current Medications     amlodipine (NORVASC) 10 MG tablet Take 1 tablet (10 mg total) by mouth every morning.    carvedilol (COREG) 12.5 MG tablet Take 1 tablet (12.5 mg total) by mouth 2 (two) times daily.    cinacalcet (SENSIPAR) 60 MG Tab Take 1 tablet (60 mg total) by mouth daily with breakfast.  "   docusate sodium (COLACE) 100 MG capsule Take 1 capsule (100 mg total) by mouth 2 (two) times daily.    duloxetine (CYMBALTA) 30 MG capsule Take 2 capsules (60 mg total) by mouth once daily.    gabapentin (NEURONTIN) 600 MG tablet Take 1 tablet (600 mg total) by mouth 3 (three) times daily.    ketoconazole (NIZORAL) 2 % shampoo Wash hair and affected areas of skin with medicated shampoo at least 2x/week - let sit on skin at least 5 minutes prior to rinsing    lanthanum (FOSRENOL) 1000 MG chewable tablet Take 1 tablet (1,000 mg total) by mouth 3 (three) times daily with meals.    lisinopril (PRINIVIL,ZESTRIL) 20 MG tablet Take 2 tablets (40 mg total) by mouth once daily.    pantoprazole (PROTONIX) 40 MG tablet Take 1 tablet (40 mg total) by mouth once daily.    sodium chloride (OCEAN) 0.65 % nasal spray 1 spray by Nasal route 2 (two) times daily.    tramadol (ULTRAM) 50 mg tablet Take 1 tablet (50 mg total) by mouth 3 (three) times daily as needed for Pain.           Clinical Reference Information           Your Vitals Were     BP Pulse Height Weight BMI    158/89 91 5' 6" (1.676 m) 74.1 kg (163 lb 5.8 oz) 26.37 kg/m2      Blood Pressure          Most Recent Value    BP  (!)  158/89      Allergies as of 3/20/2017     No Known Allergies      Immunizations Administered on Date of Encounter - 3/20/2017     None      Maintenance Dialysis History     Start End Type Comments Center    1/8/1888  [place]  [facility]            Current Dialysis Center Information     [facility] [address] Phone #:  [phone]    Contact:  N/A DERRELL Niño  [id number] Fax #:  [phone]            Language Assistance Services     ATTENTION: Language assistance services are available, free of charge. Please call [phone].      ATENCIÓN: Si habla español, tiene a engle disposición servicios gratuitos de asistencia lingüística. Llame al [phone].     CHÚ Ý: N?u b?n nói Ti?ng Vi?t, có các d?ch v? h? " tr? luciano shultz? mi?n phí dành cho b?n. G?i s? [phone].         [facility] complies with applicable Federal civil rights laws and does not discriminate on the basis of race, color, national origin, age, disability, or sex.

## 2017-03-20 NOTE — PROGRESS NOTES
Vascular Neurology  Clinic Note    Reason For Visit (Chief Complaint): history of multiple intracerebral hemorrhages, f/u from recent hospitalization on 2/22/17    HPI: 51 y.o. right handed male with history of multiple intracerebral hemorrhages (listed below) w/ extensive cerebral microbleeds on MRI and bilateral macrobleeding events, last of which is 2/22/17. He does not have any complaints today. States that he is compliant with his blood pressure medications but can't name them. Says that his primary care doctor moved, so he is waiting for something in the mail to tell him his next appointment. He gets HD 3x/week, already completed this morning, his BP at dialysis he can't recall.    5/2013 - left basal ganglia hemorrhage  9/2016 - right basal ganglia hemorrhage  11/2016 - R striatocapsular ICH w/ Sharon  2/22/2017 - L caudate head ICH  Patient's family member is present and they informed me that .....    Brain Imaging:  MRI 2/23/17 - SWAN w/ CMBs throughout brainstem, deep subcortical areas and bilateral temporal and parietal lobe in additon to e/o bilateral deep subcortical macrohemorrhage consistent w/ prior clinica events and workup; FLAIR does not show extensive white matter disease   Vessel Imaging:  CTA -  no e/o vasculitis and MRA -  no e/o vasculitis (2/2017)  Cardiac Evaluation:  TTE - 2/23 - 50%; diastolic dysfunction, severe TR, pulmonary hypertension  Other:   None  Relevant Labwork:    Recent Labs  Lab 11/02/16  0957   LDL CHOLESTEROL 73.2   HDL 33 L   TRIGLYCERIDES 109   CHOLESTEROL 128       I independently viewed the above imaging studies in addition to reviewing the report.  I reviewed the above labwork.    Review of Systems  Msk: negative for muscle pain  Skin: negative for pruritis  Neuro: negative for headache  All others negative    Past Medical History  Past Medical History:   Diagnosis Date    Acute on chronic diastolic CHF (congestive heart failure) 9/14/2016    Anemia     Chronic low  back pain 12/1/2015    Diabetes mellitus, type II     Diabetic neuropathy     ESRD on hemodialysis     Hepatitis C     states hepatitis B    Hypertension     Left basal ganglia ICH 5/6/2013    left basal ganglia ICH 5/2013 11/2/2016    Metabolic acidosis, IAG, reduced excretion of inorganic acids     Myoclonic jerking 9/20/2016    Obesity     Secondary hyperparathyroidism (of renal origin)     TB lung, latent 8/25/2015    Thyroid disease      Family History  No relevant history   Social History  Former Smoker     Medication List with Changes/Refills   Current Medications    AMLODIPINE (NORVASC) 10 MG TABLET    Take 1 tablet (10 mg total) by mouth every morning.    CARVEDILOL (COREG) 12.5 MG TABLET    Take 1 tablet (12.5 mg total) by mouth 2 (two) times daily.    CINACALCET (SENSIPAR) 60 MG TAB    Take 1 tablet (60 mg total) by mouth daily with breakfast.    DOCUSATE SODIUM (COLACE) 100 MG CAPSULE    Take 1 capsule (100 mg total) by mouth 2 (two) times daily.    DULOXETINE (CYMBALTA) 30 MG CAPSULE    Take 2 capsules (60 mg total) by mouth once daily.    GABAPENTIN (NEURONTIN) 600 MG TABLET    Take 1 tablet (600 mg total) by mouth 3 (three) times daily.    KETOCONAZOLE (NIZORAL) 2 % SHAMPOO    Wash hair and affected areas of skin with medicated shampoo at least 2x/week - let sit on skin at least 5 minutes prior to rinsing    LANTHANUM (FOSRENOL) 1000 MG CHEWABLE TABLET    Take 1 tablet (1,000 mg total) by mouth 3 (three) times daily with meals.    LISINOPRIL (PRINIVIL,ZESTRIL) 20 MG TABLET    Take 2 tablets (40 mg total) by mouth once daily.    PANTOPRAZOLE (PROTONIX) 40 MG TABLET    Take 1 tablet (40 mg total) by mouth once daily.    SODIUM CHLORIDE (OCEAN) 0.65 % NASAL SPRAY    1 spray by Nasal route 2 (two) times daily.    TRAMADOL (ULTRAM) 50 MG TABLET    Take 1 tablet (50 mg total) by mouth 3 (three) times daily as needed for Pain.       EXAM  Vital Signs:Blood pressure (!) 158/89, pulse 91, height  "5' 6" (1.676 m), weight 74.1 kg (163 lb 5.8 oz).  General: well appearing without discomfort   Mental Status:alert, oriented to person - place - age - month   Language: able to name, repeat, comprehend commands   Cranial Nerves: EOMI, PERRL, no facial asymmetry, tongue to midline, palate midline  Motor: LLE BKA  Sensory: intact light touch bilaterally, intact proprioception bilaterally  Coordination: no ataxia on finger-to-nose or heel-to-shin testing; no truncal ataxia  Gait & Stance: normal    NIH Stroke Scale:  Interval: baseline (upon arrival/admit)  Level of Consciousness: 0 - alert  LOC Questions: 0 - answers both correctly  LOC Commands: 0 - performs both correctly  Best Gaze: 0 - normal  Visual: 0 - no visual loss  Facial Palsy: 0 - normal  Motor Left Arm: 0 - no drift  Motor Right Arm: 0 - no drift  Motor Left Le - no drift  Motor Right Le - no drift  Limb Ataxia: 0 - absent  Sensory: 0 - normal  Best Language: 0 - no aphasia  Dysarthria: 0 - normal articulation  Extinction and Inattention: 0 - no neglect  NIH Stroke Scale Total: 0        MoCA not performed    ___________________  ASSESSMENT & PLAN    Left Caudate Head ICH 2017  Goal BP < 130/80 if tolerated; signficant history of ICH as described above, continues to have valuves elevated in setting of difficult control w/ ESRD on HD    Intracerebral Hemorrhage: L BG 2013; R BG 2016; R BG 2016; L caudate head 2017  Vessel imaging appears normal w/o e/o vasculitis or suspicion for any other process other than uncontrolled HTN; CMBs present as far back as     Essential hypertension  Goal < 130/80, difficult w/ ESRD on HD    ESRD on hemodialysis  Continue treatment    Type 2 diabetes 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

## 2017-03-23 ENCOUNTER — LAB VISIT (OUTPATIENT)
Dept: LAB | Facility: HOSPITAL | Age: 52
End: 2017-03-23
Attending: INTERNAL MEDICINE
Payer: MEDICARE

## 2017-03-23 ENCOUNTER — OFFICE VISIT (OUTPATIENT)
Dept: PRIMARY CARE CLINIC | Facility: CLINIC | Age: 52
End: 2017-03-23
Payer: MEDICARE

## 2017-03-23 VITALS
DIASTOLIC BLOOD PRESSURE: 98 MMHG | BODY MASS INDEX: 26.05 KG/M2 | HEIGHT: 66 IN | HEART RATE: 74 BPM | SYSTOLIC BLOOD PRESSURE: 167 MMHG | WEIGHT: 162.06 LBS | OXYGEN SATURATION: 97 %

## 2017-03-23 DIAGNOSIS — Z86.79 HISTORY OF SPONTANEOUS INTRAPARENCHYMAL INTRACRANIAL HEMORRHAGE ASSOCIATED WITH HYPERTENSION: Chronic | ICD-10-CM

## 2017-03-23 DIAGNOSIS — N18.6 MALIGNANT HYPERTENSION WITH HEART FAILURE AND END-STAGE RENAL DIS: Chronic | ICD-10-CM

## 2017-03-23 DIAGNOSIS — D63.1 ANEMIA IN CHRONIC KIDNEY DISEASE(285.21): Chronic | ICD-10-CM

## 2017-03-23 DIAGNOSIS — Z99.2 ESRD ON HEMODIALYSIS: Chronic | ICD-10-CM

## 2017-03-23 DIAGNOSIS — E78.5 DYSLIPIDEMIA: Chronic | ICD-10-CM

## 2017-03-23 DIAGNOSIS — N18.6 TYPE 2 DIABETES MELLITUS WITH CHRONIC KIDNEY DISEASE ON CHRONIC DIALYSIS, WITHOUT LONG-TERM CURRENT USE OF INSULIN: Chronic | ICD-10-CM

## 2017-03-23 DIAGNOSIS — I50.32 CHRONIC DIASTOLIC HEART FAILURE: ICD-10-CM

## 2017-03-23 DIAGNOSIS — N18.6 ESRD ON HEMODIALYSIS: Chronic | ICD-10-CM

## 2017-03-23 DIAGNOSIS — E11.22 TYPE 2 DIABETES MELLITUS WITH CHRONIC KIDNEY DISEASE ON CHRONIC DIALYSIS, WITHOUT LONG-TERM CURRENT USE OF INSULIN: Chronic | ICD-10-CM

## 2017-03-23 DIAGNOSIS — R06.6 HICCUPS: ICD-10-CM

## 2017-03-23 DIAGNOSIS — Z99.2 TYPE 2 DIABETES MELLITUS WITH CHRONIC KIDNEY DISEASE ON CHRONIC DIALYSIS, WITHOUT LONG-TERM CURRENT USE OF INSULIN: Chronic | ICD-10-CM

## 2017-03-23 DIAGNOSIS — N25.81 SECONDARY HYPERPARATHYROIDISM OF RENAL ORIGIN: Chronic | ICD-10-CM

## 2017-03-23 DIAGNOSIS — Z89.512 HISTORY OF LEFT BELOW KNEE AMPUTATION: Chronic | ICD-10-CM

## 2017-03-23 DIAGNOSIS — I50.32 CHRONIC DIASTOLIC HEART FAILURE: Primary | ICD-10-CM

## 2017-03-23 DIAGNOSIS — I13.2 MALIGNANT HYPERTENSION WITH HEART FAILURE AND END-STAGE RENAL DIS: Chronic | ICD-10-CM

## 2017-03-23 DIAGNOSIS — N18.9 ANEMIA IN CHRONIC KIDNEY DISEASE(285.21): Chronic | ICD-10-CM

## 2017-03-23 DIAGNOSIS — E11.51 PERIPHERAL VASCULAR DISEASE IN DIABETES MELLITUS: Chronic | ICD-10-CM

## 2017-03-23 PROBLEM — I61.0 NONTRAUMATIC SUBCORTICAL HEMORRHAGE OF LEFT CEREBRAL HEMISPHERE: Status: RESOLVED | Noted: 2017-02-24 | Resolved: 2017-03-23

## 2017-03-23 PROBLEM — R11.2 NAUSEA & VOMITING: Status: RESOLVED | Noted: 2017-02-22 | Resolved: 2017-03-23

## 2017-03-23 PROCEDURE — 86480 TB TEST CELL IMMUN MEASURE: CPT

## 2017-03-23 PROCEDURE — 99999 PR PBB SHADOW E&M-EST. PATIENT-LVL III: CPT | Mod: PBBFAC,,,

## 2017-03-23 PROCEDURE — 99215 OFFICE O/P EST HI 40 MIN: CPT | Mod: S$PBB,,, | Performed by: INTERNAL MEDICINE

## 2017-03-23 RX ORDER — LISINOPRIL 40 MG/1
40 TABLET ORAL NIGHTLY
Qty: 90 TABLET | Refills: 4 | Status: SHIPPED | OUTPATIENT
Start: 2017-03-23 | End: 2017-10-25 | Stop reason: SDUPTHER

## 2017-03-23 RX ORDER — CHLORPROMAZINE HYDROCHLORIDE 25 MG/1
25 TABLET, FILM COATED ORAL 3 TIMES DAILY PRN
Qty: 90 TABLET | Refills: 11 | Status: ON HOLD | OUTPATIENT
Start: 2017-03-23 | End: 2017-07-28 | Stop reason: HOSPADM

## 2017-03-23 RX ORDER — ONDANSETRON HYDROCHLORIDE 8 MG/1
8 TABLET, FILM COATED ORAL EVERY 8 HOURS PRN
Qty: 90 TABLET | Refills: 4 | Status: ON HOLD | OUTPATIENT
Start: 2017-03-23 | End: 2017-06-24

## 2017-03-23 RX ORDER — CARVEDILOL 25 MG/1
25 TABLET ORAL 2 TIMES DAILY WITH MEALS
Qty: 180 TABLET | Refills: 4 | Status: SHIPPED | OUTPATIENT
Start: 2017-03-23 | End: 2017-10-25 | Stop reason: SDUPTHER

## 2017-03-23 RX ORDER — ATORVASTATIN CALCIUM 40 MG/1
40 TABLET, FILM COATED ORAL DAILY
Qty: 90 TABLET | Refills: 4 | Status: ON HOLD | OUTPATIENT
Start: 2017-03-23 | End: 2017-07-28 | Stop reason: HOSPADM

## 2017-03-23 RX ORDER — CALCIUM CARBONATE 500(1250)
1 TABLET,CHEWABLE ORAL
Status: ON HOLD | COMMUNITY
Start: 2017-03-23 | End: 2017-07-28 | Stop reason: HOSPADM

## 2017-03-23 RX ORDER — HYDRALAZINE HYDROCHLORIDE 50 MG/1
50 TABLET, FILM COATED ORAL EVERY 8 HOURS PRN
Qty: 90 TABLET | Refills: 4 | Status: SHIPPED | OUTPATIENT
Start: 2017-03-23 | End: 2017-10-25 | Stop reason: SDUPTHER

## 2017-03-23 RX ORDER — ACETAMINOPHEN 500 MG
500 TABLET ORAL EVERY 6 HOURS PRN
Refills: 0 | COMMUNITY
Start: 2017-03-23 | End: 2017-09-06

## 2017-03-23 RX ORDER — AMLODIPINE BESYLATE 10 MG/1
10 TABLET ORAL EVERY MORNING
Qty: 90 TABLET | Refills: 4 | Status: SHIPPED | OUTPATIENT
Start: 2017-03-23 | End: 2017-10-25 | Stop reason: SDUPTHER

## 2017-03-23 RX ORDER — CARVEDILOL 25 MG/1
25 TABLET ORAL NIGHTLY
COMMUNITY
End: 2017-03-23 | Stop reason: SDUPTHER

## 2017-03-23 NOTE — PATIENT INSTRUCTIONS
Hypertension (High Blood Pressure) Parameters:    Mornin tablet of Carvedilol 25 mg    1 tablet of Amlodipine 10 mg    Evenin tablet of Carvedilol 25 mg     1 tablet of Lisinopril 40 mg      - Measure your blood pressure twice daily (morning and night before taking blood pressure medicines)    - If the systolic blood pressure (top number) is greater than 180:     ? Take Hydralazine 50 mg 1 tablet every 8 hours as needed for systolic blood pressure > 180.        Call physician or email by [facility] if persistent times three episodes.     ? Seek Urgent Care if you have chest pain, severe headache or shortness of breath.    - If the systolic blood pressure (top number) is less than 100:     ? Hold the dose of your blood pressure medicine for one time.     ? If you have to hold your blood pressure medicine three times in a role, call physician.      Use SAStid soap on scalp.    Bring Vaccine Record from Dialysis Center next visit with Dr. Balderas on .

## 2017-03-23 NOTE — PROGRESS NOTES
[facility]  New Visit Progress Note   Recent Hospital Discharge     PRESENTING HISTORY     Chief Complaint/Reason for Visit:  Follow up Hospital Discharge   No chief complaint on file.    PCP: Primary Doctor No    History of Present Illness: Mr. Vaughn Retana is a 51 y.o. male who was recently admitted to the hospital.    Admit Date: 2/22/2017  Discharge Date: 02/25/2017  Discharge Attending Physician: Lul Zepeda MD      Diagnoses:         Active Hospital Problems     Diagnosis   POA    *Left-sided nontraumatic intracerebral hemorrhage [I61.9]   Yes       Priority: 1 - High    Nausea & vomiting [R11.2]   Yes    Diastolic heart failure [I50.30]   Yes       Chronic    History of left below knee amputation 12/18/13 [Z89.512]   Not Applicable       Chronic    Dyslipidemia [E78.5]   Yes       Chronic    ESRD on hemodialysis [N18.6, Z99.2]   Not Applicable       Chronic    Type 2 diabetes mellitus with chronic kidney disease on chronic dialysis [E11.22, N18.6, Z99.2]   Not Applicable       Chronic    Essential hypertension [I10]   Yes       Chronic       Resolved Hospital Problems     Diagnosis Date Resolved POA   No resolved problems to display.         Discharged Condition: admit problems have stabilized      HOSPITAL COURSE:   Initial Presentation:      Vaughn Retana is a 51 y.o. male with previous L basal ganglia ICH 9/16, anemia, ?DMII, Hepatitis C, HTN (medication non-compliance), acute on chronic diastolic CHF, L BKA who presents to [facility] ED for evaluation of HA, nausea, and vomiting x 2 days.      Course of Principle Problem for Admission:     No focal neurological deficits on admission. BP at presentation 208/117. Found to have small L caudate bleed on CT with several additional small hemorrhagic foci, hence admitted initially to Neuro critical care for close monitoring. Milo Coma Scale Total: 15 and NIH Stroke Scale Total: 0.      Vascular neurology was on board for management.  Neurosurgery was consulted and recommended no active interventions considering the bleed size and stable symptoms. Hence, conservative management with no antiplatelets, or antithrombotics and anti-emetics for N/V was carried. Repeat CT revealed stable exam demonstrating small area of acute intraparenchymal hemorrhage within the left caudate head, with no new intracranial hemorrhage or major vascular territory infarct. MRA ruled any evidence of hemodynamically significant stenosis, occlusion, AV malformation, or aneurysm of the anterior or posterior circulation. MRI revealed sequela of bilateral remote infarcts/hemorrhage and chronic microangiopathic changes. Hence closely monitored BP for avoiding hypotensive episodes or abrupt fall for high risk for hypotensive infarct / watershed infarcts. Dosed home BP meds accordingly and also consideration was given for chronic uncontrolled HTN and less strict standard BP control/target for ICH patients (skip scheduled BP meds if required). Patient was underwent dialysis during the hospital stay. PT/OT were on-board, who recommended home health discharge instructions. Patient remained hemodynamically stable, with no focal neurological deficit through the hospital stay hence discharge with f/u appointments to vascular neurology and his PCP.      Other Medical Problems Addressed in the Hospital:     Essential hypertension  - HTN uncontrolled on admission  - Home meds include amlo, lisinopril and coreg      - currently on amlo / coreg. Held lisinopril transiently    - BP well controlled, with episodes of borderline hypotension during hospital stay  - Patient MRI reveals sequela of bilateral remote infarcts/hemorrhage and chronic microangiopathic changes. Hence closely monitored BP for avoiding hypotensive episodes or abrupt fall for high risk for hypotensive infarct / watershed infarcts.  - Dosed home BP meds accordingly and also consideration given given for chronic uncontrolled  HTN and less strict standard BP control/target for ICH patients (skip scheduled BP meds if required)      Type 2 diabetes mellitus with chronic kidney disease on chronic dialysis  - BS uncontrolled on admission, Not on any meds  - Patient denied the diagnosis       - managed on SSI  - f/u HbA1C  - maintained BS between 130s - 150s  - f/u PCP on discharge      ESRD on hemodialysis  - nephro followed  - Received dialysis today  - shall hold on to his regular schedule on discharge       History of left below knee amputation 12/18/13  - Stable. No acute issues to address      Diastolic heart failure  - f/u  Echo Low normal to mildly depressed left ventricular systolic function (EF 50-55%) + Left ventricular diastolic dysfunction. + Pulmonary hypertension. The estimated PA systolic pressure is 86 mmHg.       - continue coreg  - hold lisinopril ? ZAINAB on CKD  - D/c on lisinopril       Nausea & vomiting  - N/V on admission  - controlled on prn zofran / Reglan      CONSULTS:   Neurosurgery:  - No active interventions  - SBP<160.  - Medical management otherwise per NCC.     Nephrology:   - ESRD (MWF)  -M[place] at [facility] [place], last HD on Monday  -mild hyperkalemia noted on Renal panel at 5.2  -performed 3 hour HD treatment today  -UF 1-2L      Cardiology:   Severe Tricuspid Valve regurgitation and Pulm HTN  - recommended volume removal via HD  - HTN management per primary with goal of 120/70  - No further cardiac interventions at this time.     Pertinent/Significant Diagnostic Studies:      CT Head 2/22: Stable exam demonstrating small area of acute intraparenchymal hemorrhage within the left caudate head.  No new intracranial hemorrhage or major vascular territory infarct.      MRI Brain 2/23: Acute/subacute left caudate hemorrhage, correlating with finding on recent CT.  There is sequela of bilateral remote infarcts/hemorrhage and chronic microangiopathic changes, similar in appearance to the previous MRI.  No evidence of  hydrocephalus,   midline shift, or acute infarct.     Special Treatments/Procedures: N/A  Disposition:  Home with Home Health      Future Scheduled Appointments:  Vascular Neurology in 3-4 weeks  __________________________________________________________________    Today:  No complains except hiccups. He was prescribe chlorpromazine in the past.  No chest pain or SOB.    Review of Systems:  Review of Systems   Constitutional: Negative for chills and fever.   Respiratory: Negative for cough and shortness of breath.    Cardiovascular: Negative for leg swelling.   Gastrointestinal: Positive for nausea (Hiccups). Negative for diarrhea.   Genitourinary: Negative for dysuria.   Musculoskeletal: Positive for joint pain (right knee).   Skin: Negative for rash.   Neurological: Negative for dizziness and headaches.   Psychiatric/Behavioral: The patient is not nervous/anxious.      PAST HISTORY:     Past Medical History:   Diagnosis Date    Amputation stump pain 9/10/2013    Aspiration pneumonia 7/27/2015    Asterixis 11/8/2016    C. difficile colitis 8/7/2015    Cholelithiasis without obstruction 8/25/2015    Chronic diastolic heart failure     2-23-17   1 - Low normal to mildly depressed left ventricular systolic function (EF 50-55%).    2 - Right ventricular enlargement with mildly depressed systolic function.    3 - Left ventricular diastolic dysfunction.    4 - Right atrial enlargement.    5 - Severe tricuspid regurgitation.    6 - Pulmonary hypertension. The estimated PA systolic pressure is 86 mmHg.    7 - Increased central venous pressure.    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    Metabolic acidosis, IAG, reduced excretion of inorganic acids     Myoclonic jerking 9/20/2016    Secondary hyperparathyroidism (of renal origin)     Secondary pulmonary hypertension 3/23/2017    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    TB lung, latent 8/25/2015       Past Surgical History:   Procedure Laterality Date    COLONOSCOPY      FOOT AMPUTATION THROUGH METATARSAL      left foot    LEG AMPUTATION THROUGH KNEE  12/18/2013    left BKA    R AVF  9/12/12       Family History   Problem Relation Age of Onset    Early death Mother     Kidney disease Father     Hypertension Father     Heart disease Father     Hyperlipidemia Father     Diabetes Brother     Alcohol abuse Maternal Grandmother     Diabetes Maternal Grandfather     Melanoma Neg Hx        Social History     Social History    Marital status:      Spouse name: N/A    Number of children: N/A    Years of education: N/A     Social History Main Topics    Smoking status: Former Smoker     Packs/day: 1.00     Years: 10.00    Smokeless tobacco: Never Used    Alcohol use No    Drug use: No    Sexual activity: Not Asked     Other Topics Concern    None     Social History Narrative       MEDICATIONS & ALLERGIES:     Current Outpatient Prescriptions on File Prior to Visit   Medication Sig Dispense Refill    cinacalcet (SENSIPAR) 60 MG Tab Take 1 tablet (60 mg total) by mouth daily with breakfast. 30 tablet 3    amlodipine (NORVASC) 10 MG tablet Take 1 tablet (10 mg total) by mouth every morning. 90 tablet 3     carvedilol (COREG) 12.5 MG tablet Take 1 tablet (12.5 mg total) by mouth 2 (two) times daily. 60 tablet 11    lisinopril (PRINIVIL,ZESTRIL) 20 MG tablet Take 2 tablets (40 mg total) by mouth once daily. 90 tablet 3                                                              No current facility-administered medications on file prior to visit.       Review of patient's allergies indicates:  No Known Allergies    OBJECTIVE:     Vital Signs:  Vitals:    03/23/17 1348   BP: (!) 167/98   Pulse: 74     Wt Readings from Last 1 Encounters:   03/23/17 1348 73.5 kg (162 lb 0.6 oz)     Body mass index is 26.15 kg/(m^2).     Physical Exam:  General: Well developed, well nourished. No distress.  HEENT: Head is normocephalic, atraumatic; ears are normal.    Acne on scalp - mild, no pustules.  Eyes: Clear conjunctiva.  Neck: Supple, symmetrical neck; trachea midline.  Lungs: Clear to auscultation bilaterally and normal respiratory effort.  Cardiovascular: Heart with regular rate and rhythm.    Extremities: No LE edema. Left below knee prosthesis.  Abdomen: Abdomen is soft, non-tender non-distended with normal bowel sounds.  Skin: Skin color, texture, turgor normal. No rashes.  Musculoskeletal: Normal gait.   Psychiatric: Not depressed.    Laboratory  Lab Results   Component Value Date    WBC 4.21 02/25/2017    HGB 12.2 (L) 02/25/2017    HCT 34.9 (L) 02/25/2017    MCV 87 02/25/2017     02/25/2017     BMP  Lab Results   Component Value Date     02/25/2017    K 4.8 02/25/2017    CL 96 02/25/2017    CO2 22 (L) 02/25/2017    BUN 70 (H) 02/25/2017    CREATININE 8.5 (H) 02/25/2017    CALCIUM 8.1 (L) 02/25/2017    ANIONGAP 18 (H) 02/25/2017    ESTGFRAFRICA 7.5 (A) 02/25/2017    EGFRNONAA 6.5 (A) 02/25/2017     Lab Results   Component Value Date    ALT 15 02/25/2017    AST 22 02/25/2017    ALKPHOS 142 (H) 02/25/2017    BILITOT 0.9 02/25/2017     Lab Results   Component Value Date    INR 1.3 (H) 02/23/2017    INR 1.3 (H) 11/05/2016    INR 1.4 (H) 11/04/2016     Lab Results   Component Value Date    HGBA1C text 04/01/2012       TRANSITION OF CARE:     Transition of Care Visit:     I have reviewed and updated the history and problem list.  I have  reconciled the medication list.  I have discussed the hospitalization and current medical issues, prognosis and plans with the patient/family.  I  spent more than 50% of time discussing the care with the patient/family.  Total Encounter in the [facility]: 60 minutes.    Medications Reconciliation:   I have reconciled the patient's home medications and discharge medications with the patient/family. I have updated all changes.  Refer to After-Visit Medication List.    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  Dispense: 180 tablet; Refill: 4  -     lisinopril (PRINIVIL,ZESTRIL) 40 MG tablet; Take 1 tablet (40 mg total) by mouth every evening.  Dispense: 90 tablet; Refill: 4  -     amlodipine (NORVASC) 10 MG tablet; Take 1 tablet (10 mg total) by mouth every morning.  Dispense: 90 tablet; Refill: 4  -     hydrALAZINE (APRESOLINE) 50 MG tablet; Take 1 tablet (50 mg total) by mouth every 8 (eight) hours as needed (systolic blood pressure (top number) > 180).  Dispense: 90 tablet; Refill: 4    ESRD on hemodialysis  Anemia in chronic kidney disease  Secondary hyperparathyroidism of renal origin  - Continue sensipar and use TUMS instead of fosrenol.  -     calcium carbonate (OS-FERNANDA) 500 mg calcium (1,250 mg) chewable tablet; Take 1 tablet (500 mg total) by mouth 3 (three) times daily with meals.    History of Intracerebral Hemorrhage: L BG 5/2013; R BG 9/2016; R BG 11/2016; L caudate head 2/2017  - Likely from malignant hypertension.    Type 2 diabetes mellitus with chronic kidney disease on chronic dialysis, without long-term current use of insulin  - Not on diabetic meds.    Will check A1C.    History of Latent TB 2015   Positive HCV - normal LFT  - Normal CXR.  No sputum.  - Will check:  -     Quantiferon Gold TB; Future; Expected date: 3/23/17   -     RPR; Future; Expected date: 3/23/17    History of left below knee amputation 13  Peripheral vascular disease in diabetes mellitus  Dyslipidemia  -     atorvastatin (LIPITOR) 40 MG tablet; Take 1 tablet (40 mg total) by mouth once daily.  Dispense: 90 tablet; Refill: 4    Hiccups  -     chlorproMAZINE (THORAZINE) 25 MG tablet; Take 1 tablet (25 mg total) by mouth 3 (three) times daily as needed (Hiccups).  Dispense: 90 tablet; Refill: 11  -     ondansetron (ZOFRAN) 8 MG tablet; Take 1 tablet (8 mg total) by mouth every 8 (eight) hours as needed for Nausea.  Dispense: 90 tablet; Refill: 4    Knee Pain  Rx:  -     acetaminophen (TYLENOL) 500 MG tablet; Take 1 tablet (500 mg total) by mouth every 6 (six) hours as needed for Pain.; Refill: 0      Instructions for the patient:  Hypertension (High Blood Pressure) Parameters:    Mornin tablet of Carvedilol 25 mg    1 tablet of Amlodipine 10 mg    Evenin tablet of Carvedilol 25 mg     1 tablet of Lisinopril 40 mg    - Measure your blood pressure twice daily (morning and night before taking blood pressure medicines)    - If the systolic blood pressure (top number) is greater than 180:     ? Take Hydralazine 50 mg 1 tablet every 8 hours as needed for systolic blood pressure > 180.        Call physician or email by [facility] if persistent times three episodes.     ? Seek Urgent Care if you have chest pain, severe headache or shortness of breath.    - If the systolic blood pressure (top number) is less than 100:     ? Hold the dose of your blood pressure medicine for one time.     ? If you have to hold your blood pressure medicine three times in a role, call physician.    Use SAStid soap on scalp.    Bring Vaccine Record from Dialysis Center next visit with Dr. Balderas on .  Scheduled Follow-up :  Future Appointments  Date Time Provider Department Center    3/23/2017 3:00 PM PHYSICIAN, [facility] [address] [facility]   3/23/2017 4:00 PM LAB, SAME DAY [facility] LAB IM [address] [facility]   4/4/2017 7:10 AM LAB, APPOINTMENT [facility] [address] [facility]   4/20/2017 1:00 PM Concepcion Balderas MD [facility] IM [address] [facility]       After Visit Medication List :     Medication List          This list is accurate as of: 3/23/17  2:55 PM.  Always use your most recent med list.                     acetaminophen 500 MG tablet   Commonly known as:  TYLENOL   Take 1 tablet (500 mg total) by mouth every 6 (six) hours as needed for Pain.       amlodipine 10 MG tablet   Commonly known as:  NORVASC   Take 1 tablet (10 mg total) by mouth every morning.       atorvastatin 40 MG tablet   Commonly known as:  LIPITOR   Take 1 tablet (40 mg total) by mouth once daily.       calcium carbonate 500 mg calcium (1,250 mg) chewable tablet   Commonly known as:  OS-FERNANDA   Take 1 tablet (500 mg total) by mouth 3 (three) times daily with meals.       carvedilol 25 MG tablet   Commonly known as:  COREG   Take 1 tablet (25 mg total) by mouth 2 (two) times daily with meals.       chlorproMAZINE 25 MG tablet   Commonly known as:  THORAZINE   Take 1 tablet (25 mg total) by mouth 3 (three) times daily as needed (Hiccups).       cinacalcet 60 MG Tab   Commonly known as:  SENSIPAR   Take 1 tablet (60 mg total) by mouth daily with breakfast.       hydrALAZINE 50 MG tablet   Commonly known as:  APRESOLINE   Take 1 tablet (50 mg total) by mouth every 8 (eight) hours as needed (systolic blood pressure (top number) > 180).       lisinopril 40 MG tablet   Commonly known as:  PRINIVIL,ZESTRIL   Take 1 tablet (40 mg total) by mouth every evening.       ondansetron 8 MG tablet   Commonly known as:  ZOFRAN   Take 1 tablet (8 mg total) by mouth every 8 (eight) hours as needed for Nausea.            Where to Get Your Medications      These medications were sent to [id number] - [place], [place] - [address]  [address] [address]     Phone:  [phone]     amlodipine 10 MG tablet    atorvastatin 40 MG tablet    carvedilol 25 MG tablet    chlorproMAZINE 25 MG tablet    hydrALAZINE 50 MG tablet    lisinopril 40 MG tablet    ondansetron 8 MG tablet         You can get these medications from any pharmacy     You don't need a prescription for these medications     acetaminophen 500 MG tablet    calcium carbonate 500 mg calcium (1,250 mg) chewable tablet             Signing Physician:  Zay Dueñas MD

## 2017-03-23 NOTE — MR AVS SNAPSHOT
[facility]  [address]  [place] [address]  Phone: [phone]                  Vaughn Retana   3/23/2017 3:00 PM   Office Visit    Description:  Male : 1965   Provider:  [facility]ICIAN, [facility]   Department:  [facility]           Diagnoses this Visit        Comments    Chronic diastolic heart failure    -  Primary     Malignant hypertension with heart failure and end-stage renal dis         ESRD on hemodialysis         Secondary pulmonary hypertension         Hemiparesis affecting left side as late effect of stroke         History of spontaneous intraparenchymal intracranial hemorrhage associated with hypertension         Type 2 diabetes mellitus with chronic kidney disease on chronic dialysis, without long-term current use of insulin         Dyslipidemia         Anemia in chronic kidney disease         Secondary hyperparathyroidism of renal origin         History of left below knee amputation         Peripheral vascular disease in diabetes mellitus         Phantom limb pain         Hiccups                To Do List           Future Appointments        Provider Department Dept Phone    3/23/2017 3:00 PM PHYSICIAN, [facility] [phone]    2017 7:10 AM LAB, APPOINTMENT [place] [facility] [phone]    2017 1:00 PM Concepcion Balderas MD [facility] [phone]      Your Future Surgeries/Procedures     2017   Surgery with Walker Stern MD   [facility] ([facility])    [address] [facility] [address] [phone]              Goals (5 Years of Data)     None       These Medications        Disp Refills Start End    carvedilol (COREG) 25 MG tablet 180 tablet 4 3/23/2017     Take 1 tablet (25 mg total) by mouth 2 (two) times daily with meals. - Oral    Pharmacy: [facility]/pharmacy #[id number] - [place], [place] - [address] Ph #: [phone]       lisinopril (PRINIVIL,ZESTRIL) 40 MG tablet 90 tablet 4 3/23/2017     Take 1 tablet (40 mg total) by mouth every evening. - Oral    Pharmacy: [facility]/pharmacy #[address] [place] [address] [address]. Ph #: [phone]       amlodipine (NORVASC) 10 MG tablet 90 tablet 4 3/23/2017     Take 1 tablet (10 mg total) by mouth every morning. - Oral    Pharmacy: [facility]/pharmacy #[address] [place] [address] [address]. Ph #: [phone]       chlorproMAZINE (THORAZINE) 25 MG tablet 90 tablet 11 3/23/2017 3/23/2018    Take 1 tablet (25 mg total) by mouth 3 (three) times daily as needed (Hiccups). - Oral    Pharmacy: [facility]/pharmacy #[address] [place] [address] [address]. Ph #: [phone]       ondansetron (ZOFRAN) 8 MG tablet 90 tablet 4 3/23/2017     Take 1 tablet (8 mg total) by mouth every 8 (eight) hours as needed for Nausea. - Oral    Pharmacy: [facility]/pharmacy #[address] [place] [address] [address]. Ph #: [phone]       atorvastatin (LIPITOR) 40 MG tablet 90 tablet 4 3/23/2017 3/23/2018    Take 1 tablet (40 mg total) by mouth once daily. - Oral    Pharmacy: [facility]/pharmacy #[address] [place] [address] [place] [address] [address]. Ph #: [phone]       hydrALAZINE (APRESOLINE) 50 MG tablet 90 tablet 4 3/23/2017 3/23/2018    Take 1 tablet (50 mg total) by mouth every 8 (eight) hours as needed (systolic blood pressure (top number) > 180). - Oral    Pharmacy: [facility]/pharmacy #[address] [place] [address] [address]. Ph #: [phone]         PURCHASE these Medications (No prescription required)        Start End    calcium carbonate (OS-FERNANDA) 500 mg calcium (1,250 mg) chewable tablet 3/23/2017 3/23/2018    Sig - Route: Take 1 tablet (500 mg total) by mouth 3 (three) times daily with meals. - Oral    Class: OTC    acetaminophen (TYLENOL) 500 MG tablet 3/23/2017     Sig - Route: Take 1 tablet (500 mg total) by mouth every 6 (six) hours as needed for Pain. - Oral    Class: OTC       [facility]     [facility] - 24/7 Assistance  Registered nurses in the [facility] provide clinical advisement, health education, appointment booking, and other advisory services.  Call for this free service at [phone].             Medications           Message regarding Medications     Verify the changes and/or additions to your medication regime listed below are the same as discussed with your clinician today.  If any of these changes or additions are incorrect, please notify your healthcare provider.        START taking these NEW medications        Refills    calcium carbonate (OS-FERNANDA) 500 mg calcium (1,250 mg) chewable tablet     Sig: Take 1 tablet (500 mg total) by mouth 3 (three) times daily with meals.    Class: OTC    Route: Oral    carvedilol (COREG) 25 MG tablet 4    Sig: Take 1 tablet (25 mg total) by mouth 2 (two) times daily with meals.    Class: Normal    Route: Oral    chlorproMAZINE (THORAZINE) 25 MG tablet 11    Sig: Take 1 tablet (25 mg total) by mouth 3 (three) times daily as needed (Hiccups).    Class: Normal    Route: Oral    ondansetron (ZOFRAN) 8 MG tablet 4    Sig: Take 1 tablet (8 mg total) by mouth every 8 (eight) hours as needed for Nausea.    Class: Normal    Route: Oral    acetaminophen (TYLENOL) 500 MG tablet 0    Sig: Take 1 tablet (500 mg total) by mouth every 6 (six) hours as needed for Pain.    Class: OTC    Route: Oral    atorvastatin (LIPITOR) 40 MG tablet 4    Sig: Take 1 tablet (40 mg total) by mouth once daily.    Class: Normal    Route: Oral    hydrALAZINE (APRESOLINE) 50 MG tablet 4    Sig: Take 1 tablet (50 mg total) by mouth every 8 (eight) hours as needed (systolic blood pressure (top number) > 180).    Class: Normal    Route: Oral      CHANGE how you are taking these medications     Start Taking Instead of    lisinopril (PRINIVIL,ZESTRIL) 40 MG tablet lisinopril (PRINIVIL,ZESTRIL) 20 MG tablet    Dosage:  Take 1 tablet (40 mg  total) by mouth every evening. Dosage:  Take 2 tablets (40 mg total) by mouth once daily.    Reason for Change:  Reorder       STOP taking these medications     docusate sodium (COLACE) 100 MG capsule Take 1 capsule (100 mg total) by mouth 2 (two) times daily.    gabapentin (NEURONTIN) 600 MG tablet Take 1 tablet (600 mg total) by mouth 3 (three) times daily.    duloxetine (CYMBALTA) 30 MG capsule Take 2 capsules (60 mg total) by mouth once daily.    ketoconazole (NIZORAL) 2 % shampoo Wash hair and affected areas of skin with medicated shampoo at least 2x/week - let sit on skin at least 5 minutes prior to rinsing    pantoprazole (PROTONIX) 40 MG tablet Take 1 tablet (40 mg total) by mouth once daily.    sodium chloride (OCEAN) 0.65 % nasal spray 1 spray by Nasal route 2 (two) times daily.    lanthanum (FOSRENOL) 1000 MG chewable tablet Take 1 tablet (1,000 mg total) by mouth 3 (three) times daily with meals.    tramadol (ULTRAM) 50 mg tablet Take 1 tablet (50 mg total) by mouth 3 (three) times daily as needed for Pain.           Verify that the below list of medications is an accurate representation of the medications you are currently taking.  If none reported, the list may be blank. If incorrect, please contact your healthcare provider. Carry this list with you in case of emergency.           Current Medications     acetaminophen (TYLENOL) 500 MG tablet Take 1 tablet (500 mg total) by mouth every 6 (six) hours as needed for Pain.    amlodipine (NORVASC) 10 MG tablet Take 1 tablet (10 mg total) by mouth every morning.    atorvastatin (LIPITOR) 40 MG tablet Take 1 tablet (40 mg total) by mouth once daily.    calcium carbonate (OS-FERNANDA) 500 mg calcium (1,250 mg) chewable tablet Take 1 tablet (500 mg total) by mouth 3 (three) times daily with meals.    carvedilol (COREG) 25 MG tablet Take 1 tablet (25 mg total) by mouth 2 (two) times daily with meals.    chlorproMAZINE (THORAZINE) 25 MG tablet Take 1 tablet (25 mg  "total) by mouth 3 (three) times daily as needed (Hiccups).    cinacalcet (SENSIPAR) 60 MG Tab Take 1 tablet (60 mg total) by mouth daily with breakfast.    hydrALAZINE (APRESOLINE) 50 MG tablet Take 1 tablet (50 mg total) by mouth every 8 (eight) hours as needed (systolic blood pressure (top number) > 180).    lisinopril (PRINIVIL,ZESTRIL) 40 MG tablet Take 1 tablet (40 mg total) by mouth every evening.    ondansetron (ZOFRAN) 8 MG tablet Take 1 tablet (8 mg total) by mouth every 8 (eight) hours as needed for Nausea.           Clinical Reference Information           Your Vitals Were     BP Pulse Height Weight SpO2 BMI    167/98 (BP Location: Left arm, Patient Position: Sitting) 74 5' 6" (1.676 m) 73.5 kg (162 lb 0.6 oz) 97% 26.15 kg/m2      Blood Pressure          Most Recent Value    BP  (!)  167/98      Allergies as of 3/23/2017     Fosrenol [Lanthanum]      Immunizations Administered on Date of Encounter - 3/23/2017     None      Maintenance Dialysis History     Start End Type Comments Center    [address]  [facility]            Current Dialysis Center Information     [facility] [address] Phone #:  [phone]    Contact:  N/A [place]ERRE[place] Niño  [address] Fax #:  [phone]            Instructions    Hypertension (High Blood Pressure) Parameters:    Mornin tablet of Carvedilol 25 mg    1 tablet of Amlodipine 10 mg    Evenin tablet of Carvedilol 25 mg     1 tablet of Lisinopril 40 mg      - Measure your blood pressure twice daily (morning and night before taking blood pressure medicines)    - If the systolic blood pressure (top number) is greater than 180:     ? Take Hydralazine 50 mg 1 tablet every 8 hours as needed for systolic blood pressure > 180.        Call physician or email by [facility] if persistent times three episodes.     ? Seek Urgent Care if you have chest pain, severe headache or shortness of breath.    - If the systolic blood pressure (top " number) is less than 100:     ? Hold the dose of your blood pressure medicine for one time.     ? If you have to hold your blood pressure medicine three times in a role, call physician.      Use SAStid soap on scalp.    Bring Vaccine Record from Dialysis Center next visit with Dr. Balderas on 4/20.       Language Assistance Services     ATTENTION: Language assistance services are available, free of charge. Please call [phone].      ATENCIÓN: Si habla español, tiene a engle disposición servicios gratuitos de asistencia lingüística. Llame al [phone].     [facility] Ý: N?u b?n nói Ti?ng Vi?t, có các d?ch v? h? tr? ngôn ng? mi?n phí dành cho b?n. G?i s? [phone].         [facility] complies with applicable Federal civil rights laws and does not discriminate on the basis of race, color, national origin, age, disability, or sex.

## 2017-03-23 NOTE — PROGRESS NOTES
PharmD [facility] Note      Date of Discharge: 2/25/17      Patient presents to clinic for follow-up after recent discharge from the hospital on 2/25/17. Patient was admitted on 2/22 for evaluation of headache, nausea, vomiting x2 days.    Patient presents today with his medications, but was very unfamiliar with them.  Patient brought in two different carvedilol bottles, 12.5 mg and 25 mg.  The patient reports that he takes one 12.5 mg tablet in the morning and one 25 mg tablet in the evening.  Per patient, blood pressures run in the one-teens, but today in clinic BP is 167/98 mmHg.  Patient reported that he did not take his blood pressure medications this morning.  When asked if that was a regular occurrence, he responded no, that he had overslept this morning.      The patient mentioned that he had stopped taking his Fosrenal 3 weeks ago due to it making him nauseous.  He reports stopping gabapentin, duloxetine, pantoprazole, tramadol, Ocean nasal spray, ketoconazole shampoo, and docusate.  When asked, he mentioned he is experiencing knee pain.  He confirms that he is able to move his bowels even without the docusate.  The patient is requesting a refill for the ketoconazole shampoo.        The patient did request something for hiccups.  He brought in an empty medication bottle with the label of Thorazine.  Patient could not recall why he was prescribed this.  Suspect it was for the hiccups.      Patient's medication regimen at discharge was as follows:    Discharge Medication List:   amlodipine 10 MG tablet   Commonly known as:  NORVASC   Take 1 tablet (10 mg total) by mouth every morning.       calcium carbonate 500 mg calcium (1,250 mg) chewable tablet   Commonly known as:  OS-FERNANDA   Take 1 tablet (500 mg total) by mouth 3 (three) times daily with meals.       carvedilol 25 MG tablet   Commonly known as:  COREG   Take 1 tablet (25 mg total) by mouth 2 (two) times daily with meals.       cinacalcet 60 MG Tab  "  Commonly known as:  SENSIPAR   Take 1 tablet (60 mg total) by mouth daily with breakfast.       lisinopril 20 MG tablet   Commonly known as:  PRINIVIL,ZESTRIL   Take 2 tablets (40 mg total) by mouth once daily.             Medications have been reviewed and reconciled.    In home Medication list is not consistent with discharge medication regimen.       Medication discrepancies identified:  Omission: Patient had stopped taking docusate, duloxetine, gabapentin, ketoconazole, pantoprazole, ocean nasal spray, and tramadol  Wrong dose: Patient is taking a drug differently than how ordered upon discharge. Patient reported taking carvedilol 12.5 mg in the AM, 25 mg in the PM    Last 3 sets of Vitals    Vitals - 1 value per visit 2/25/2017 3/20/2017 3/23/2017   SYSTOLIC 129 158 167   DIASTOLIC 81 89 98   PULSE 67 91 74   TEMPERATURE 98.3 - -   RESPIRATIONS 16 - -   SPO2 97 - 97   Weight (lb) - 163.36 162.04   HEIGHT - 5' 6" 5' 6"   BODY MASS INDEX - 26.37 26.15   VISIT REPORT - - -   Pain Score  - 0 0     BMP  Lab Results   Component Value Date     02/25/2017    K 4.8 02/25/2017    CL 96 02/25/2017    CO2 22 (L) 02/25/2017    BUN 70 (H) 02/25/2017    CREATININE 8.5 (H) 02/25/2017    CALCIUM 8.1 (L) 02/25/2017    ANIONGAP 18 (H) 02/25/2017    ESTGFRAFRICA 7.5 (A) 02/25/2017    EGFRNONAA 6.5 (A) 02/25/2017     Lab Results   Component Value Date    WBC 4.21 02/25/2017    HGB 12.2 (L) 02/25/2017    HCT 34.9 (L) 02/25/2017    MCV 87 02/25/2017     02/25/2017         Medication Therapy Plan for Medication related problems since discharge and Associated Resolutions:    1. Adverse Drug Effects: Yes (Fosrenal)  Associated Resolutions: Initiate a new Medication    2. Medication education needs: Yes  Associated Resolutions: General Medication education counseling provided    3. Medication Affordability: Yes  Associated Resolutions: General Medication education counseling provided    4. Non-Adherence (knowledge deficit) " non- intentional: Yes (Carvedilol)  Associated Resolutions: General Medication education counseling provided      Medication Therapy Plan:  1. Chronic diastolic heart failure  Patient's most recent EF was 50% (2/23/17)  Patient's blood pressure goal is < 140/90 mmHg  Current pressure is 167/98 (no blood pressure medication this morning)    Recommend increasing carvedilol to 25 mg BID, and also consider adding on hydralazine.  Monitor blood pressure.     Counseled patient to do his best not to miss carvedilol doses due to the risk of rebound hypertension when suddenly discontinuing beta-blockers.     2. ESRD on hemodialysis  Patient reports being off of Fosrenal for 3 weeks due to nausea.      Recommend to discontinue Fosrenal and use Tums instead.  Hoping patient will tolerate this and it is more affordable for him.    Monitor phosphorous levels.     3. Dyslipidemia  Consider starting patient on a high-intensity statin therapy due to previous stroke.      4. Secondary hyperparathyroidism of renal origin  Patient takes Senispar.    Consider ordering a  PTH level to assess adherence.      5. Peripheral vascular disease in diabetes mellitus  Consider starting patient on aspirin 81 mg       Lou Benson, PharmD   PGY1 Pharmacy Resident

## 2017-03-23 NOTE — Clinical Note
[facility] Visit (Post Discharge Follow-up) Today:  - Our clinic physicians and nurses plan to follow the patient up for any medical issues in the [facility] for 30 days post discharge.  Future Appointments: 3/23/2017  4:00 PM    LAB, SAME DAY [facility] MIGUEL  [facility] HECTOR DAVIS  4/4/2017   7:10 AM    LAB, APPOINTMENT [place] [facility]   [facility]  4/20/2017  1:00 PM    Concepcion Balderas MD        [facility]        [facility]occo CAMACHO

## 2017-03-28 LAB
MITOGEN NIL: >10 IU/ML
NIL: 0.06 IU/ML
TB ANTIGEN NIL: 0.31 IU/ML
TB ANTIGEN: 0.37 IU/ML
TB GOLD: NEGATIVE

## 2017-04-04 ENCOUNTER — HOSPITAL ENCOUNTER (OUTPATIENT)
Facility: HOSPITAL | Age: 52
Discharge: HOME | End: 2017-04-04
Attending: INTERNAL MEDICINE | Admitting: INTERNAL MEDICINE
Payer: MEDICARE

## 2017-04-04 ENCOUNTER — SURGERY (OUTPATIENT)
Age: 52
End: 2017-04-04

## 2017-04-04 ENCOUNTER — ANESTHESIA (OUTPATIENT)
Dept: ENDOSCOPY | Facility: HOSPITAL | Age: 52
End: 2017-04-04
Payer: MEDICARE

## 2017-04-04 ENCOUNTER — ANESTHESIA EVENT (OUTPATIENT)
Dept: ENDOSCOPY | Facility: HOSPITAL | Age: 52
End: 2017-04-04
Payer: MEDICARE

## 2017-04-04 VITALS
HEART RATE: 70 BPM | WEIGHT: 180 LBS | RESPIRATION RATE: 16 BRPM | HEIGHT: 66 IN | DIASTOLIC BLOOD PRESSURE: 88 MMHG | BODY MASS INDEX: 28.93 KG/M2 | TEMPERATURE: 98 F | SYSTOLIC BLOOD PRESSURE: 158 MMHG | OXYGEN SATURATION: 98 %

## 2017-04-04 DIAGNOSIS — R06.6 HICCUPS: Primary | ICD-10-CM

## 2017-04-04 LAB — POCT GLUCOSE: 99 MG/DL (ref 70–110)

## 2017-04-04 PROCEDURE — D9220A PRA ANESTHESIA: Mod: PT,ANES,, | Performed by: ANESTHESIOLOGY

## 2017-04-04 PROCEDURE — 82962 GLUCOSE BLOOD TEST: CPT | Performed by: INTERNAL MEDICINE

## 2017-04-04 PROCEDURE — 25000003 PHARM REV CODE 250: Performed by: NURSE ANESTHETIST, CERTIFIED REGISTERED

## 2017-04-04 PROCEDURE — 45385 COLONOSCOPY W/LESION REMOVAL: CPT | Performed by: INTERNAL MEDICINE

## 2017-04-04 PROCEDURE — D9220A PRA ANESTHESIA: Mod: PT,CRNA,, | Performed by: NURSE ANESTHETIST, CERTIFIED REGISTERED

## 2017-04-04 PROCEDURE — 27201012 HC FORCEPS, HOT/COLD, DISP: Performed by: INTERNAL MEDICINE

## 2017-04-04 PROCEDURE — 37000009 HC ANESTHESIA EA ADD 15 MINS: Performed by: INTERNAL MEDICINE

## 2017-04-04 PROCEDURE — 25000003 PHARM REV CODE 250: Performed by: INTERNAL MEDICINE

## 2017-04-04 PROCEDURE — 45380 COLONOSCOPY AND BIOPSY: CPT | Mod: 59,,, | Performed by: INTERNAL MEDICINE

## 2017-04-04 PROCEDURE — 45380 COLONOSCOPY AND BIOPSY: CPT | Performed by: INTERNAL MEDICINE

## 2017-04-04 PROCEDURE — 37000008 HC ANESTHESIA 1ST 15 MINUTES: Performed by: INTERNAL MEDICINE

## 2017-04-04 PROCEDURE — 43235 EGD DIAGNOSTIC BRUSH WASH: CPT | Mod: 51,,, | Performed by: INTERNAL MEDICINE

## 2017-04-04 PROCEDURE — 36620 INSERTION CATHETER ARTERY: CPT | Performed by: ANESTHESIOLOGY

## 2017-04-04 PROCEDURE — 27201089 HC SNARE, DISP (ANY): Performed by: INTERNAL MEDICINE

## 2017-04-04 PROCEDURE — 45385 COLONOSCOPY W/LESION REMOVAL: CPT | Mod: PT,,, | Performed by: INTERNAL MEDICINE

## 2017-04-04 PROCEDURE — 43235 EGD DIAGNOSTIC BRUSH WASH: CPT | Performed by: INTERNAL MEDICINE

## 2017-04-04 PROCEDURE — 88305 TISSUE EXAM BY PATHOLOGIST: CPT | Performed by: PATHOLOGY

## 2017-04-04 PROCEDURE — 63600175 PHARM REV CODE 636 W HCPCS: Performed by: NURSE ANESTHETIST, CERTIFIED REGISTERED

## 2017-04-04 PROCEDURE — 88305 TISSUE EXAM BY PATHOLOGIST: CPT | Mod: 26,,, | Performed by: PATHOLOGY

## 2017-04-04 PROCEDURE — 36620 INSERTION CATHETER ARTERY: CPT | Mod: 59,,, | Performed by: ANESTHESIOLOGY

## 2017-04-04 RX ORDER — OMEPRAZOLE 40 MG/1
40 CAPSULE, DELAYED RELEASE ORAL EVERY MORNING
Qty: 30 CAPSULE | Refills: 6 | Status: ON HOLD | OUTPATIENT
Start: 2017-04-04 | End: 2017-06-24

## 2017-04-04 RX ORDER — PROPOFOL 10 MG/ML
VIAL (ML) INTRAVENOUS CONTINUOUS PRN
Status: DISCONTINUED | OUTPATIENT
Start: 2017-04-04 | End: 2017-04-04

## 2017-04-04 RX ORDER — LIDOCAINE HCL/PF 100 MG/5ML
SYRINGE (ML) INTRAVENOUS
Status: DISCONTINUED | OUTPATIENT
Start: 2017-04-04 | End: 2017-04-04

## 2017-04-04 RX ORDER — ETOMIDATE 2 MG/ML
INJECTION INTRAVENOUS
Status: DISCONTINUED | OUTPATIENT
Start: 2017-04-04 | End: 2017-04-04

## 2017-04-04 RX ORDER — PROPOFOL 10 MG/ML
VIAL (ML) INTRAVENOUS
Status: DISCONTINUED | OUTPATIENT
Start: 2017-04-04 | End: 2017-04-04

## 2017-04-04 RX ORDER — SODIUM CHLORIDE 9 MG/ML
INJECTION, SOLUTION INTRAVENOUS CONTINUOUS
Status: DISCONTINUED | OUTPATIENT
Start: 2017-04-04 | End: 2017-04-04 | Stop reason: HOSPADM

## 2017-04-04 RX ADMIN — LIDOCAINE HYDROCHLORIDE 75 MG: 20 INJECTION, SOLUTION INTRAVENOUS at 09:04

## 2017-04-04 RX ADMIN — ETOMIDATE 10 MG: 2 INJECTION, SOLUTION INTRAVENOUS at 09:04

## 2017-04-04 RX ADMIN — PROPOFOL 40 MG: 10 INJECTION, EMULSION INTRAVENOUS at 09:04

## 2017-04-04 RX ADMIN — PROPOFOL 40 MCG/KG/MIN: 10 INJECTION, EMULSION INTRAVENOUS at 09:04

## 2017-04-04 RX ADMIN — SODIUM CHLORIDE: 0.9 INJECTION, SOLUTION INTRAVENOUS at 08:04

## 2017-04-04 NOTE — H&P (VIEW-ONLY)
[facility]  New Visit Progress Note   Recent Hospital Discharge     PRESENTING HISTORY     Chief Complaint/Reason for Visit:  Follow up Hospital Discharge   No chief complaint on file.    PCP: Primary Doctor No    History of Present Illness: Mr. Vaughn Retana is a 51 y.o. male who was recently admitted to the hospital.    Admit Date: 2/22/2017  Discharge Date: 02/25/2017  Discharge Attending Physician: Lul Zepeda MD      Diagnoses:         Active Hospital Problems     Diagnosis   POA    *Left-sided nontraumatic intracerebral hemorrhage [I61.9]   Yes       Priority: 1 - High    Nausea & vomiting [R11.2]   Yes    Diastolic heart failure [I50.30]   Yes       Chronic    History of left below knee amputation 12/18/13 [Z89.512]   Not Applicable       Chronic    Dyslipidemia [E78.5]   Yes       Chronic    ESRD on hemodialysis [N18.6, Z99.2]   Not Applicable       Chronic    Type 2 diabetes mellitus with chronic kidney disease on chronic dialysis [E11.22, N18.6, Z99.2]   Not Applicable       Chronic    Essential hypertension [I10]   Yes       Chronic       Resolved Hospital Problems     Diagnosis Date Resolved POA   No resolved problems to display.         Discharged Condition: admit problems have stabilized      HOSPITAL COURSE:   Initial Presentation:      Vaughn Retana is a 51 y.o. male with previous L basal ganglia ICH 9/16, anemia, ?DMII, Hepatitis C, HTN (medication non-compliance), acute on chronic diastolic CHF, L BKA who presents to [facility] ED for evaluation of HA, nausea, and vomiting x 2 days.      Course of Principle Problem for Admission:     No focal neurological deficits on admission. BP at presentation 208/117. Found to have small L caudate bleed on CT with several additional small hemorrhagic foci, hence admitted initially to Neuro critical care for close monitoring. Milo Coma Scale Total: 15 and NIH Stroke Scale Total: 0.      Vascular neurology was on board for management.  Neurosurgery was consulted and recommended no active interventions considering the bleed size and stable symptoms. Hence, conservative management with no antiplatelets, or antithrombotics and anti-emetics for N/V was carried. Repeat CT revealed stable exam demonstrating small area of acute intraparenchymal hemorrhage within the left caudate head, with no new intracranial hemorrhage or major vascular territory infarct. MRA ruled any evidence of hemodynamically significant stenosis, occlusion, AV malformation, or aneurysm of the anterior or posterior circulation. MRI revealed sequela of bilateral remote infarcts/hemorrhage and chronic microangiopathic changes. Hence closely monitored BP for avoiding hypotensive episodes or abrupt fall for high risk for hypotensive infarct / watershed infarcts. Dosed home BP meds accordingly and also consideration was given for chronic uncontrolled HTN and less strict standard BP control/target for ICH patients (skip scheduled BP meds if required). Patient was underwent dialysis during the hospital stay. PT/OT were on-board, who recommended home health discharge instructions. Patient remained hemodynamically stable, with no focal neurological deficit through the hospital stay hence discharge with f/u appointments to vascular neurology and his PCP.      Other Medical Problems Addressed in the Hospital:     Essential hypertension  - HTN uncontrolled on admission  - Home meds include amlo, lisinopril and coreg      - currently on amlo / coreg. Held lisinopril transiently    - BP well controlled, with episodes of borderline hypotension during hospital stay  - Patient MRI reveals sequela of bilateral remote infarcts/hemorrhage and chronic microangiopathic changes. Hence closely monitored BP for avoiding hypotensive episodes or abrupt fall for high risk for hypotensive infarct / watershed infarcts.  - Dosed home BP meds accordingly and also consideration given given for chronic uncontrolled  HTN and less strict standard BP control/target for ICH patients (skip scheduled BP meds if required)      Type 2 diabetes mellitus with chronic kidney disease on chronic dialysis  - BS uncontrolled on admission, Not on any meds  - Patient denied the diagnosis       - managed on SSI  - f/u HbA1C  - maintained BS between 130s - 150s  - f/u PCP on discharge      ESRD on hemodialysis  - nephro followed  - Received dialysis today  - shall hold on to his regular schedule on discharge       History of left below knee amputation 12/18/13  - Stable. No acute issues to address      Diastolic heart failure  - f/u  Echo Low normal to mildly depressed left ventricular systolic function (EF 50-55%) + Left ventricular diastolic dysfunction. + Pulmonary hypertension. The estimated PA systolic pressure is 86 mmHg.       - continue coreg  - hold lisinopril ? ZAINAB on CKD  - D/c on lisinopril       Nausea & vomiting  - N/V on admission  - controlled on prn zofran / Reglan      CONSULTS:   Neurosurgery:  - No active interventions  - SBP<160.  - Medical management otherwise per NCC.     Nephrology:   - ESRD (MWF)  -[place] [facility], last HD on Monday  -mild hyperkalemia noted on Renal panel at 5.2  -performed 3 hour HD treatment today  -UF 1-2L      Cardiology:   Severe Tricuspid Valve regurgitation and Pulm HTN  - recommended volume removal via HD  - HTN management per primary with goal of 120/70  - No further cardiac interventions at this time.     Pertinent/Significant Diagnostic Studies:      CT Head 2/22: Stable exam demonstrating small area of acute intraparenchymal hemorrhage within the left caudate head.  No new intracranial hemorrhage or major vascular territory infarct.      MRI Brain 2/23: Acute/subacute left caudate hemorrhage, correlating with finding on recent CT.  There is sequela of bilateral remote infarcts/hemorrhage and chronic microangiopathic changes, similar in appearance to the previous MRI.  No evidence of  hydrocephalus,   midline shift, or acute infarct.     Special Treatments/Procedures: N/A  Disposition:  Home with Home Health      Future Scheduled Appointments:  Vascular Neurology in 3-4 weeks  __________________________________________________________________    Today:  No complains except hiccups. He was prescribe chlorpromazine in the past.  No chest pain or SOB.    Review of Systems:  Review of Systems   Constitutional: Negative for chills and fever.   Respiratory: Negative for cough and shortness of breath.    Cardiovascular: Negative for leg swelling.   Gastrointestinal: Positive for nausea (Hiccups). Negative for diarrhea.   Genitourinary: Negative for dysuria.   Musculoskeletal: Positive for joint pain (right knee).   Skin: Negative for rash.   Neurological: Negative for dizziness and headaches.   Psychiatric/Behavioral: The patient is not nervous/anxious.      PAST HISTORY:     Past Medical History:   Diagnosis Date    Amputation stump pain 9/10/2013    Aspiration pneumonia 7/27/2015    Asterixis 11/8/2016    C. difficile colitis 8/7/2015    Cholelithiasis without obstruction 8/25/2015    Chronic diastolic heart failure     2-23-17   1 - Low normal to mildly depressed left ventricular systolic function (EF 50-55%).    2 - Right ventricular enlargement with mildly depressed systolic function.    3 - Left ventricular diastolic dysfunction.    4 - Right atrial enlargement.    5 - Severe tricuspid regurgitation.    6 - Pulmonary hypertension. The estimated PA systolic pressure is 86 mmHg.    7 - Increased central venous pressure.    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 8/1/2015    Metabolic acidosis, IAG, reduced excretion of inorganic acids     Myoclonic jerking 9/20/2016    Secondary hyperparathyroidism (of renal origin)     Secondary pulmonary hypertension 3/23/2017    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    TB lung, latent 8/25/2015       Past Surgical History:   Procedure Laterality Date    COLONOSCOPY      FOOT AMPUTATION THROUGH METATARSAL      left foot    LEG AMPUTATION THROUGH KNEE  12/18/2013    left BKA    R AVF  9/12/12       Family History   Problem Relation Age of Onset    Early death Mother     Kidney disease Father     Hypertension Father     Heart disease Father     Hyperlipidemia Father     Diabetes Brother     Alcohol abuse Maternal Grandmother     Diabetes Maternal Grandfather     Melanoma Neg Hx        Social History     Social History    Marital status:      Spouse name: N/A    Number of children: N/A    Years of education: N/A     Social History Main Topics    Smoking status: Former Smoker     Packs/day: 1.00     Years: 10.00    Smokeless tobacco: Never Used    Alcohol use No    Drug use: No    Sexual activity: Not Asked     Other Topics Concern    None     Social History Narrative       MEDICATIONS & ALLERGIES:     Current Outpatient Prescriptions on File Prior to Visit   Medication Sig Dispense Refill    cinacalcet (SENSIPAR) 60 MG Tab Take 1 tablet (60 mg total) by mouth daily with breakfast. 30 tablet 3    amlodipine (NORVASC) 10 MG tablet Take 1 tablet (10 mg total) by mouth every morning. 90 tablet 3     carvedilol (COREG) 12.5 MG tablet Take 1 tablet (12.5 mg total) by mouth 2 (two) times daily. 60 tablet 11    lisinopril (PRINIVIL,ZESTRIL) 20 MG tablet Take 2 tablets (40 mg total) by mouth once daily. 90 tablet 3                                                              No current facility-administered medications on file prior to visit.       Review of patient's allergies indicates:  No Known Allergies    OBJECTIVE:     Vital Signs:  Vitals:    03/23/17 1348   BP: (!) 167/98   Pulse: 74     Wt Readings from Last 1 Encounters:   03/23/17 1348 73.5 kg (162 lb 0.6 oz)     Body mass index is 26.15 kg/(m^2).     Physical Exam:  General: Well developed, well nourished. No distress.  HEENT: Head is normocephalic, atraumatic; ears are normal.    Acne on scalp - mild, no pustules.  Eyes: Clear conjunctiva.  Neck: Supple, symmetrical neck; trachea midline.  Lungs: Clear to auscultation bilaterally and normal respiratory effort.  Cardiovascular: Heart with regular rate and rhythm.    Extremities: No LE edema. Left below knee prosthesis.  Abdomen: Abdomen is soft, non-tender non-distended with normal bowel sounds.  Skin: Skin color, texture, turgor normal. No rashes.  Musculoskeletal: Normal gait.   Psychiatric: Not depressed.    Laboratory  Lab Results   Component Value Date    WBC 4.21 02/25/2017    HGB 12.2 (L) 02/25/2017    HCT 34.9 (L) 02/25/2017    MCV 87 02/25/2017     02/25/2017     BMP  Lab Results   Component Value Date     02/25/2017    K 4.8 02/25/2017    CL 96 02/25/2017    CO2 22 (L) 02/25/2017    BUN 70 (H) 02/25/2017    CREATININE 8.5 (H) 02/25/2017    CALCIUM 8.1 (L) 02/25/2017    ANIONGAP 18 (H) 02/25/2017    ESTGFRAFRICA 7.5 (A) 02/25/2017    EGFRNONAA 6.5 (A) 02/25/2017     Lab Results   Component Value Date    ALT 15 02/25/2017    AST 22 02/25/2017    ALKPHOS 142 (H) 02/25/2017    BILITOT 0.9 02/25/2017     Lab Results   Component Value Date    INR 1.3 (H) 02/23/2017    INR 1.3 (H) 11/05/2016    INR 1.4 (H) 11/04/2016     Lab Results   Component Value Date    HGBA1C text 04/01/2012       TRANSITION OF CARE:     Transition of Care Visit:     I have reviewed and updated the history and problem list.  I have  reconciled the medication list.  I have discussed the hospitalization and current medical issues, prognosis and plans with the patient/family.  I  spent more than 50% of time discussing the care with the patient/family.  Total Encounter in the [facility]: 60 minutes.    Medications Reconciliation:   I have reconciled the patient's home medications and discharge medications with the patient/family. I have updated all changes.  Refer to After-Visit Medication List.    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  Dispense: 180 tablet; Refill: 4  -     lisinopril (PRINIVIL,ZESTRIL) 40 MG tablet; Take 1 tablet (40 mg total) by mouth every evening.  Dispense: 90 tablet; Refill: 4  -     amlodipine (NORVASC) 10 MG tablet; Take 1 tablet (10 mg total) by mouth every morning.  Dispense: 90 tablet; Refill: 4  -     hydrALAZINE (APRESOLINE) 50 MG tablet; Take 1 tablet (50 mg total) by mouth every 8 (eight) hours as needed (systolic blood pressure (top number) > 180).  Dispense: 90 tablet; Refill: 4    ESRD on hemodialysis  Anemia in chronic kidney disease  Secondary hyperparathyroidism of renal origin  - Continue sensipar and use TUMS instead of fosrenol.  -     calcium carbonate (OS-FERNANDA) 500 mg calcium (1,250 mg) chewable tablet; Take 1 tablet (500 mg total) by mouth 3 (three) times daily with meals.    History of Intracerebral Hemorrhage: L BG 5/2013; R BG 9/2016; R BG 11/2016; L caudate head 2/2017  - Likely from malignant hypertension.    Type 2 diabetes mellitus with chronic kidney disease on chronic dialysis, without long-term current use of insulin  - Not on diabetic meds.    Will check A1C.    History of Latent TB 2015   Positive HCV - normal LFT  - Normal CXR.  No sputum.  - Will check:  -     Quantiferon Gold TB; Future; Expected date: 3/23/17   -     RPR; Future; Expected date: 3/23/17    History of left below knee amputation 13  Peripheral vascular disease in diabetes mellitus  Dyslipidemia  -     atorvastatin (LIPITOR) 40 MG tablet; Take 1 tablet (40 mg total) by mouth once daily.  Dispense: 90 tablet; Refill: 4    Hiccups  -     chlorproMAZINE (THORAZINE) 25 MG tablet; Take 1 tablet (25 mg total) by mouth 3 (three) times daily as needed (Hiccups).  Dispense: 90 tablet; Refill: 11  -     ondansetron (ZOFRAN) 8 MG tablet; Take 1 tablet (8 mg total) by mouth every 8 (eight) hours as needed for Nausea.  Dispense: 90 tablet; Refill: 4    Knee Pain  Rx:  -     acetaminophen (TYLENOL) 500 MG tablet; Take 1 tablet (500 mg total) by mouth every 6 (six) hours as needed for Pain.; Refill: 0      Instructions for the patient:  Hypertension (High Blood Pressure) Parameters:    Mornin tablet of Carvedilol 25 mg    1 tablet of Amlodipine 10 mg    Evenin tablet of Carvedilol 25 mg     1 tablet of Lisinopril 40 mg    - Measure your blood pressure twice daily (morning and night before taking blood pressure medicines)    - If the systolic blood pressure (top number) is greater than 180:     ? Take Hydralazine 50 mg 1 tablet every 8 hours as needed for systolic blood pressure > 180.        Call physician or email by [facility] if persistent times three episodes.     ? Seek Urgent Care if you have chest pain, severe headache or shortness of breath.    - If the systolic blood pressure (top number) is less than 100:     ? Hold the dose of your blood pressure medicine for one time.     ? If you have to hold your blood pressure medicine three times in a role, call physician.    Use SAStid soap on scalp.    Bring Vaccine Record from Dialysis Center next visit with Dr. Balderas on .  Scheduled Follow-up :  Future Appointments  Date Time Provider Department Center    3/23/2017 3:00 PM PHYSICIAN, [facility] IMPRICL [address] [facility]   3/23/2017 4:00 PM LAB, SAME DAY [facility] INTMED [facility] LAB IM [address] [facility]   4/4/2017 7:10 AM LAB, APPOINTMENT [facility] LAB [facility] [address] [facility]   4/20/2017 1:00 PM Concepcion Balderas MD [facility] IM [address] [facility]       After Visit Medication List :     Medication List          This list is accurate as of: 3/23/17  2:55 PM.  Always use your most recent med list.                     acetaminophen 500 MG tablet   Commonly known as:  TYLENOL   Take 1 tablet (500 mg total) by mouth every 6 (six) hours as needed for Pain.       amlodipine 10 MG tablet   Commonly known as:  NORVASC   Take 1 tablet (10 mg total) by mouth every morning.       atorvastatin 40 MG tablet   Commonly known as:  LIPITOR   Take 1 tablet (40 mg total) by mouth once daily.       calcium carbonate 500 mg calcium (1,250 mg) chewable tablet   Commonly known as:  OS-FERNANDA   Take 1 tablet (500 mg total) by mouth 3 (three) times daily with meals.       carvedilol 25 MG tablet   Commonly known as:  COREG   Take 1 tablet (25 mg total) by mouth 2 (two) times daily with meals.       chlorproMAZINE 25 MG tablet   Commonly known as:  THORAZINE   Take 1 tablet (25 mg total) by mouth 3 (three) times daily as needed (Hiccups).       cinacalcet 60 MG Tab   Commonly known as:  SENSIPAR   Take 1 tablet (60 mg total) by mouth daily with breakfast.       hydrALAZINE 50 MG tablet   Commonly known as:  APRESOLINE   Take 1 tablet (50 mg total) by mouth every 8 (eight) hours as needed (systolic blood pressure (top number) > 180).       lisinopril 40 MG tablet   Commonly known as:  PRINIVIL,ZESTRIL   Take 1 tablet (40 mg total) by mouth every evening.       ondansetron 8 MG tablet   Commonly known as:  ZOFRAN   Take 1 tablet (8 mg total) by mouth every 8 (eight) hours as needed for Nausea.            Where to Get Your Medications      These medications were sent to [facility]/pharmacy #[id number] - [place], [place] - [address]  [address] [address]     Phone:  [phone]     amlodipine 10 MG tablet    atorvastatin 40 MG tablet    carvedilol 25 MG tablet    chlorproMAZINE 25 MG tablet    hydrALAZINE 50 MG tablet    lisinopril 40 MG tablet    ondansetron 8 MG tablet         You can get these medications from any pharmacy     You don't need a prescription for these medications     acetaminophen 500 MG tablet    calcium carbonate 500 mg calcium (1,250 mg) chewable tablet             Signing Physician:  Zay Dueñas MD

## 2017-04-04 NOTE — INTERVAL H&P NOTE
The patient has been examined and the H&P has been reviewed:    I concur with the findings and no changes have occurred since H&P was written.     History and Exam unchanged from visit.    Procedure - EGD/Colon  Neck - supple  Plan of anesthesia - General  ASA - per anesthesia  Mallampati - per anesthesia  Anesthesia problems - no  Family history of anesthesia problems - no      Anesthesia/Surgery risks, benefits and alternative options discussed and understood by patient/family.          Active Hospital Problems    Diagnosis  POA    Hiccups [R06.6]  Yes      Resolved Hospital Problems    Diagnosis Date Resolved POA   No resolved problems to display.

## 2017-04-04 NOTE — TRANSFER OF CARE
"Anesthesia Transfer of Care Note    Patient: Vaughn Retana    Procedure(s) Performed: Procedure(s) (LRB):  ESOPHAGOGASTRODUODENOSCOPY (EGD) (N/A)  COLONOSCOPY (N/A)    Patient location: PACU    Anesthesia Type: general    Transport from OR: Transported from OR on room air with adequate spontaneous ventilation    Post pain: adequate analgesia    Post assessment: no apparent anesthetic complications    Post vital signs: stable    Level of consciousness: awake, alert and oriented    Nausea/Vomiting: no nausea/vomiting    Complications: none          Last vitals:   Visit Vitals    /89    Pulse 74    Temp 36.5 °C (97.7 °F) (Oral)    Resp 16    Ht 5' 6" (1.676 m)    Wt 81.6 kg (180 lb)    SpO2 100%    BMI 29.05 kg/m2     "

## 2017-04-04 NOTE — ANESTHESIA PROCEDURE NOTES
Arterial    Diagnosis: PAH    Patient location during procedure: pre-op  Procedure start time: 4/4/2017 9:01 AM  Timeout: 4/4/2017 9:00 AM  Procedure end time: 4/4/2017 9:02 AM  Staffing  Anesthesiologist: MARCELL MORALES  Performed by: anesthesiologist   Anesthesiologist was present at the time of the procedure.  Preanesthetic Checklist  Completed: patient identified, surgical consent, pre-op evaluation, timeout performed, IV checked, risks and benefits discussed, monitors and equipment checked and anesthesia consent given  Arterial Line  Skin Prep: alcohol swabs  Local Infiltration: lidocaine  Orientation: right  Location: radial  Catheter Size: 20 G{[facility] ANESTHESIA BLOCK ART PLACEMENTInsertion Attempts: 1  Assessment  Dressing: secured with tape and tegaderm  Patient: Tolerated well

## 2017-04-04 NOTE — ANESTHESIA RELEASE NOTE
"Anesthesia Release from PACU Note    Patient: Vaughn Retana    Procedure(s) Performed: Procedure(s) (LRB):  ESOPHAGOGASTRODUODENOSCOPY (EGD) (N/A)  COLONOSCOPY (N/A)    Anesthesia type: general    Post pain: Adequate analgesia    Post assessment: no apparent anesthetic complications and tolerated procedure well    Last Vitals:   Visit Vitals    BP (!) 159/88    Pulse 75    Temp 36.5 °C (97.7 °F) (Oral)    Resp (!) 22    Ht 5' 6" (1.676 m)    Wt 81.6 kg (180 lb)    SpO2 98%    BMI 29.05 kg/m2       Post vital signs: stable    Level of consciousness: awake and alert     Nausea/Vomiting: no nausea/no vomiting    Complications: none    Airway Patency: patent    Respiratory: unassisted, spontaneous ventilation, room air    Cardiovascular: stable and blood pressure at baseline    Hydration: euvolemic  "

## 2017-04-04 NOTE — ANESTHESIA PREPROCEDURE EVALUATION
04/04/2017  Vaughn Retana is a 51 y.o., male.    [facility] Anesthesia Evaluation    I have reviewed the Patient Summary Reports.    I have reviewed the Nursing Notes.   I have reviewed the Medications.     Review of Systems  Anesthesia Hx:  No problems with previous Anesthesia  History of prior surgery of interest to airway management or planning: Previous anesthesia: General Denies Family Hx of Anesthesia complications.   Denies Personal Hx of Anesthesia complications.   Social:  Non-Smoker    Cardiovascular:   Hypertension Denies CAD.        Amputee, denies MCGILL  PAH- PASP 86   Pulmonary:  Pulmonary Normal    Renal/:   Chronic Renal Disease, ESRD, Dialysis Last HD yesterday   Hepatic/GI:   Bowel Prep.    Neurological:   CVA, residual symptoms    Endocrine:   Diabetes, type 2        Physical Exam  General:  Well nourished    Airway/Jaw/Neck:  Airway Findings: Mouth Opening: Normal Tongue: Normal  General Airway Assessment: Adult  Mallampati: I  TM Distance: Normal, at least 6 cm  Jaw/Neck Findings:  Neck ROM: Normal ROM      Dental:  Dental Findings: In tact   Chest/Lungs:  Chest/Lungs Findings: Clear to auscultation, Normal Respiratory Rate     Heart/Vascular:  Heart Findings: Rate: Normal  Rhythm: Regular Rhythm        Mental Status:  Mental Status Findings:  Cooperative, Alert and Oriented         Anesthesia Plan  Type of Anesthesia, risks & benefits discussed:  Anesthesia Type:  general  Patient's Preference:   Intra-op Monitoring Plan:   Intra-op Monitoring Plan Comments:   Post Op Pain Control Plan:   Post Op Pain Control Plan Comments:   Induction:   IV  Beta Blocker:  Patient is on a Beta-Blocker and has received one dose within the past 24 hours (No further documentation required).       Informed Consent: Patient understands risks and agrees with Anesthesia plan.  Questions answered. Anesthesia  consent signed with patient.  ASA Score: 3     Day of Surgery Review of History & Physical:    H&P update referred to the surgeon.         Ready For Surgery From Anesthesia Perspective.     Date of Procedure: 02/23/2017        TEST DESCRIPTION   Technical Quality: This is a portable study performed at the patient's bedside. This is a technically challenging study.     Aorta: The aortic root is normal in size, measuring 2.8 cm at sinotubular junction and 3.2 cm at Sinuses of Valsalva. The proximal ascending aorta is normal in size, measuring 3.4 cm across.     Left Atrium: The left atrial volume index is normal, measuring 33.64 cc/m2.     Left Ventricle: The left ventricle is normal in size, with an end-diastolic diameter of 5.2 cm, and an end-systolic diameter of 3.6 cm. LV wall thickness is normal, with the septum measuring 0.9 cm and the posterior wall measuring 1.0 cm across. Relative   wall thickness was normal at 0.38, and the LV mass index was 113.6 g/m2 consistent with normal left ventricular mass. The anterior septum is hypokinetic. Global left ventricular systolic function appears low normal to mildly depressed. Visually   estimated ejection fraction is 50-55%. The LV Doppler derived stroke volume equals 56.0 ccs.   The E/e'(lat) is 13, consistent with significant diastolic dysfunction.     Right Atrium: The right atrium is enlarged, measuring 5.3 cm in length and 5.0 cm in width in the apical view.     Right Ventricle: The right ventricle is enlarged measuring 5.2 cm at the base in the apical right ventricle-focused view. Global right ventricular systolic function appears mildly depressed. Tricuspid annular plane systolic excursion (TAPSE) is 1.5 cm.   The estimated PA systolic pressure is 86 mmHg.     Aortic Valve:  Aortic valve is normal in structure with normal leaflet mobility.     Mitral Valve:  There is trivial mitral regurgitation. Mitral valve is normal in structure with normal leaflet mobility.      Tricuspid Valve:  There is severe tricuspid regurgitation. Tricuspid valve is normal in structure with normal leaflet mobility.     Pulmonary Valve:  There is mild pulmonic regurgitation. Pulmonary valve is normal in structure with normal leaflet mobility.     IVC: IVC is enlarged and collapses < 50% with a sniff, suggesting high right atrial pressure of 15 mmHg.     Intracavitary: There is no evidence of pericardial effusion, intracavity mass, thrombi, or vegetation.         CONCLUSIONS     1 - Low normal to mildly depressed left ventricular systolic function (EF 50-55%).     2 - Right ventricular enlargement with mildly depressed systolic function.     3 - Left ventricular diastolic dysfunction.     4 - Right atrial enlargement.     5 - Severe tricuspid regurgitation.     6 - Pulmonary hypertension. The estimated PA systolic pressure is 86 mmHg.     7 - Increased central venous pressure.

## 2017-04-04 NOTE — PLAN OF CARE
Discharge instructions given, patient verbalized understanding. Consents in chart, Vitals stable, no complaints. Prescription given

## 2017-04-04 NOTE — ANESTHESIA POSTPROCEDURE EVALUATION
"Anesthesia Post Evaluation    Patient: Vaughn Retana    Procedure(s) Performed: Procedure(s) (LRB):  ESOPHAGOGASTRODUODENOSCOPY (EGD) (N/A)  COLONOSCOPY (N/A)    Final Anesthesia Type: general  Patient location during evaluation: PACU  Patient participation: Yes- Able to Participate  Level of consciousness: awake and alert  Post-procedure vital signs: reviewed and stable  Pain management: adequate  Airway patency: patent  PONV status at discharge: No PONV  Anesthetic complications: no      Cardiovascular status: hemodynamically stable  Respiratory status: unassisted, spontaneous ventilation and room air  Hydration status: euvolemic  Follow-up not needed.        Visit Vitals    BP (!) 159/88    Pulse 75    Temp 36.5 °C (97.7 °F) (Oral)    Resp (!) 22    Ht 5' 6" (1.676 m)    Wt 81.6 kg (180 lb)    SpO2 98%    BMI 29.05 kg/m2       Pain/Haseeb Score: Pain Assessment Performed: Yes (4/4/2017 10:05 AM)  Presence of Pain: denies (4/4/2017 10:05 AM)  Haseeb Score: 10 (4/4/2017 10:05 AM)      "

## 2017-04-04 NOTE — PROGRESS NOTES
Patient transferred from pacu, iv intact. Denies pain, VSS. States he is ready to go home and eat. Sister at bedside.

## 2017-04-06 ENCOUNTER — HOME CARE VISIT (OUTPATIENT)
Dept: NEUROLOGY | Facility: HOSPITAL | Age: 52
End: 2017-04-06

## 2017-05-05 DIAGNOSIS — G89.29 CHRONIC MIDLINE LOW BACK PAIN WITH LEFT-SIDED SCIATICA: ICD-10-CM

## 2017-05-05 DIAGNOSIS — M54.42 CHRONIC MIDLINE LOW BACK PAIN WITH LEFT-SIDED SCIATICA: ICD-10-CM

## 2017-05-05 RX ORDER — TRAMADOL HYDROCHLORIDE 50 MG/1
50 TABLET ORAL 3 TIMES DAILY PRN
Qty: 90 TABLET | Refills: 2 | Status: SHIPPED | OUTPATIENT
Start: 2017-05-05 | End: 2017-05-15

## 2017-05-05 NOTE — TELEPHONE ENCOUNTER
Patient came to lobby.      Patient asked for all his pain medication refilled but only know the colors of the tables but not the names.    12/15/16 last office visit  03/23/17 last Rx refill

## 2017-05-25 ENCOUNTER — HOSPITAL ENCOUNTER (OUTPATIENT)
Facility: HOSPITAL | Age: 52
Discharge: HOME | End: 2017-05-25
Attending: INTERNAL MEDICINE | Admitting: INTERNAL MEDICINE
Payer: MEDICARE

## 2017-05-25 VITALS
WEIGHT: 175 LBS | HEART RATE: 78 BPM | DIASTOLIC BLOOD PRESSURE: 100 MMHG | RESPIRATION RATE: 20 BRPM | OXYGEN SATURATION: 97 % | HEIGHT: 66 IN | SYSTOLIC BLOOD PRESSURE: 181 MMHG | TEMPERATURE: 98 F | BODY MASS INDEX: 28.12 KG/M2

## 2017-05-25 DIAGNOSIS — T82.858A STENOSIS OF ARTERIOVENOUS DIALYSIS FISTULA, INITIAL ENCOUNTER: ICD-10-CM

## 2017-05-25 DIAGNOSIS — T82.590D MALFUNCTION OF ARTERIOVENOUS DIALYSIS FISTULA, SUBSEQUENT ENCOUNTER: Primary | ICD-10-CM

## 2017-05-25 PROCEDURE — 25000003 PHARM REV CODE 250

## 2017-05-25 PROCEDURE — 36902 INTRO CATH DIALYSIS CIRCUIT: CPT | Mod: ,,, | Performed by: INTERNAL MEDICINE

## 2017-05-25 PROCEDURE — C1769 GUIDE WIRE: HCPCS

## 2017-05-25 PROCEDURE — 63600175 PHARM REV CODE 636 W HCPCS

## 2017-05-25 NOTE — PROGRESS NOTES
Patient discharged per MD orders. Instructions given on medications, wound care, activity, signs of infection, when to call MD, and follow up appointments. Pt verbalized understanding.  Patient AAOx3, VSS, no c/o pain or discomfort at this time. Telemetry and PIV removed. Patient refused transport, ambulated off unit.

## 2017-05-25 NOTE — H&P
[facility]  History & Physical    Subjective:      Chief Complaint/Reason for Admission: High venous pressures on HD and prolonged bleeding after HD needle removal.  History of Present Illness:  Patient is a 51 y.o. male presents with High venous pressures on Hand prolonged bleeding after HD needle removal.He denies any others symptoms.      Past Medical History:   Diagnosis Date    Amputation stump pain 9/10/2013    Aspiration pneumonia 7/27/2015    Asterixis 11/8/2016    C. difficile colitis 8/7/2015    Cholelithiasis without obstruction 8/25/2015    Chronic diastolic heart failure     2-23-17   1 - Low normal to mildly depressed left ventricular systolic function (EF 50-55%).    2 - Right ventricular enlargement with mildly depressed systolic function.    3 - Left ventricular diastolic dysfunction.    4 - Right atrial enlargement.    5 - Severe tricuspid regurgitation.    6 - Pulmonary hypertension. The estimated PA systolic pressure is 86 mmHg.    7 - Increased central venous pressure.    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    Metabolic acidosis, IAG, reduced excretion of inorganic acids     Myoclonic jerking 9/20/2016    Secondary hyperparathyroidism (of renal origin)     Secondary pulmonary hypertension 3/23/2017    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    TB lung, latent 08/25/2015    Negative Quantiferon Gold 3-23-17     Past Surgical History:    Procedure Laterality Date    COLONOSCOPY      COLONOSCOPY N/A 4/4/2017    Procedure: COLONOSCOPY;  Surgeon: Walker Stern MD;  Location: [facility] ([address] [place] [address]);  Service: Endoscopy;  Laterality: N/A;  PA Systolic Pressure 85.56. HD Patient MWF, K+ lab prior to procedure.     FOOT AMPUTATION THROUGH METATARSAL      left foot    LEG AMPUTATION THROUGH KNEE  12/18/2013    left BKA    R AVF  9/12/12     Family History   Problem Relation Age of Onset    Early death Mother     Kidney disease Father     Hypertension Father     Heart disease Father     Hyperlipidemia Father     Diabetes Brother     Alcohol abuse Maternal Grandmother     Diabetes Maternal Grandfather     Melanoma Neg Hx      Social History   Substance Use Topics    Smoking status: Former Smoker     Packs/day: 1.00     Years: 10.00    Smokeless tobacco: Never Used    Alcohol use No       PTA Medications   Medication Sig    acetaminophen (TYLENOL) 500 MG tablet Take 1 tablet (500 mg total) by mouth every 6 (six) hours as needed for Pain.    amlodipine (NORVASC) 10 MG tablet Take 1 tablet (10 mg total) by mouth every morning.    atorvastatin (LIPITOR) 40 MG tablet Take 1 tablet (40 mg total) by mouth once daily.    calcium carbonate (OS-FERNANDA) 500 mg calcium (1,250 mg) chewable tablet Take 1 tablet (500 mg total) by mouth 3 (three) times daily with meals.    carvedilol (COREG) 25 MG tablet Take 1 tablet (25 mg total) by mouth 2 (two) times daily with meals.    chlorproMAZINE (THORAZINE) 25 MG tablet Take 1 tablet (25 mg total) by mouth 3 (three) times daily as needed (Hiccups).    cinacalcet (SENSIPAR) 60 MG Tab Take 1 tablet (60 mg total) by mouth daily with breakfast.    hydrALAZINE (APRESOLINE) 50 MG tablet Take 1 tablet (50 mg total) by mouth every 8 (eight) hours as needed (systolic blood pressure (top number) > 180).    lisinopril (PRINIVIL,ZESTRIL) 40 MG tablet Take 1 tablet (40 mg total) by mouth every evening.    omeprazole  (PRILOSEC) 40 MG capsule Take 1 capsule (40 mg total) by mouth every morning.    ondansetron (ZOFRAN) 8 MG tablet Take 1 tablet (8 mg total) by mouth every 8 (eight) hours as needed for Nausea.     Review of patient's allergies indicates:   Allergen Reactions    Fosrenol [lanthanum] Nausea And Vomiting     Nausea and vomiting        ROS Constitutional: No fever or chills, no weight changes.  Eyes: No visual changes or photophobia  HEENT: No nasal congestion or sore throat  Respiratory: No cough or shortness of breath  Cardiovascular: No chest pain or palpitations  Gastrointestinal: Good appetite, no nausea or vomiting, no change in bowel habits  Genitourinary: No hematuria or dysuria  Skin: No rash or pruritis  Hematologic/lymphatic: No easy bruising, bleeding or lymphadenopathy  Musculoskeletal: No arthralgias or myalgias  Neurological: No seizures or tremors  Endocrine: No heat/cold intolerance.  No polyuria/polydipsia.  Psychiatric:  No depression or anxiety.    Objective:      Vital Signs (Most Recent)       Vital Signs Range (Last 24H):       Physical Exam   General appearance: Well developed, well nourished  Head: Normocephalic, atraumatic  Eyes:  Conjunctivae nl. Sclera anicteric. PERRL.  HEENT: Lips, mucosa, and tongue normal; teeth and gums normal and oropharynx clear.  Neck: Supple, trachea midline, thyroid not enlarged,   Lungs: Clear to auscultation bilaterally and normal respiratory effort  Heart: Regular rate and rhythm, S1, S2 normal, no murmur, click, rub or gallop  Abdomen: Soft, non-tender non-distended; bowel sounds normal; no masses,  no organomegaly  Extremities: No cyanosis or clubbing. No edema  Pulses: 2+ and symmetric  Skin: Skin color, texture, turgor normal. No rashes or lesions  Lymph nodes: Cervical, supraclavicular, and axillary nodes normal.  Neurologic: Normal strength and tone. No focal numbness or weakness  Psychiatric:  Alert and oriented times 3.  Affect appropriate.  Right BC  AVF: moderate augmentation, good thrill, increased pulsatility in the arterial limb       Assessment:      Active Hospital Problems    Diagnosis  POA    Stenosis of arteriovenous dialysis fistula [T82.736M]  Yes      Resolved Hospital Problems    Diagnosis Date Resolved POA   No resolved problems to display.       Plan:    1. Fistulagram with PTA.

## 2017-05-25 NOTE — DISCHARGE SUMMARY
[facility]  Discharge Note  Short Stay    Admit Date: 5/25/2017    Discharge Date and Time: 5/25/17    Attending Physician: Grayson Hubbard MD     Discharge Provider: Carlos Delcid    Diagnoses:  Active Hospital Problems    Diagnosis  POA    Stenosis of arteriovenous dialysis fistula [T82.858A]  Yes      Resolved Hospital Problems    Diagnosis Date Resolved POA   No resolved problems to display.       Discharged Condition: stable    Hospital Course: Patient was admitted for an outpatient procedure and tolerated the procedure well with no complications.    Final Diagnoses: Same as principal problem.    Disposition: Home or Self Care    Follow up/Patient Instructions:  F/u in 6 months. Instruction given.     Medications:  Reconciled Home Medications:   Current Discharge Medication List      CONTINUE these medications which have NOT CHANGED    Details   acetaminophen (TYLENOL) 500 MG tablet Take 1 tablet (500 mg total) by mouth every 6 (six) hours as needed for Pain.  Refills: 0      amlodipine (NORVASC) 10 MG tablet Take 1 tablet (10 mg total) by mouth every morning.  Qty: 90 tablet, Refills: 4    Associated Diagnoses: Malignant hypertension with heart failure and end-stage renal dis      atorvastatin (LIPITOR) 40 MG tablet Take 1 tablet (40 mg total) by mouth once daily.  Qty: 90 tablet, Refills: 4    Associated Diagnoses: Peripheral vascular disease in diabetes mellitus      calcium carbonate (OS-FERNANDA) 500 mg calcium (1,250 mg) chewable tablet Take 1 tablet (500 mg total) by mouth 3 (three) times daily with meals.    Associated Diagnoses: ESRD on hemodialysis      carvedilol (COREG) 25 MG tablet Take 1 tablet (25 mg total) by mouth 2 (two) times daily with meals.  Qty: 180 tablet, Refills: 4    Associated Diagnoses: Malignant hypertension with heart failure and end-stage renal dis      chlorproMAZINE (THORAZINE) 25 MG tablet Take 1 tablet (25 mg total) by mouth 3 (three) times daily as needed  (Hiccups).  Qty: 90 tablet, Refills: 11    Associated Diagnoses: Hiccups      cinacalcet (SENSIPAR) 60 MG Tab Take 1 tablet (60 mg total) by mouth daily with breakfast.  Qty: 30 tablet, Refills: 3      hydrALAZINE (APRESOLINE) 50 MG tablet Take 1 tablet (50 mg total) by mouth every 8 (eight) hours as needed (systolic blood pressure (top number) > 180).  Qty: 90 tablet, Refills: 4    Associated Diagnoses: Malignant hypertension with heart failure and end-stage renal dis      lisinopril (PRINIVIL,ZESTRIL) 40 MG tablet Take 1 tablet (40 mg total) by mouth every evening.  Qty: 90 tablet, Refills: 4    Associated Diagnoses: Malignant hypertension with heart failure and end-stage renal dis      omeprazole (PRILOSEC) 40 MG capsule Take 1 capsule (40 mg total) by mouth every morning.  Qty: 30 capsule, Refills: 6      ondansetron (ZOFRAN) 8 MG tablet Take 1 tablet (8 mg total) by mouth every 8 (eight) hours as needed for Nausea.  Qty: 90 tablet, Refills: 4    Associated Diagnoses: Hiccups           No discharge procedures on file.      Discharge Procedure Orders: Resume previous diet and activity.

## 2017-05-25 NOTE — BRIEF OP NOTE
[facility]  Brief Operative Note  Nephrology     SUMMARY     Surgery Date: 5/25/2017     Surgeon(s) and Role:     * Grayson Hubbard MD - Primary    Assisting Surgeon: Carlos Delcid MD    Pre-op Diagnosis:  Malfunction of arteriovenous dialysis fistula, subsequent encounter [T82.138D]    Post-op Diagnosis: AVF malfunction     Procedure Performed:     Procedure(s) (LRB):  FISTULOGRAM (Right)   PTA of edge of stent stenosis using 10 x 4 [place]     Estimated Blood Loss: 10cc         Specimens:   Specimen (12h ago through future)    None

## 2017-05-25 NOTE — PLAN OF CARE
Problem: Patient Care Overview  Goal: Plan of Care Review  Outcome: Ongoing (interventions implemented as appropriate)  Received report from ISMAEL Davis. Patient s/p fistulagram, AAOx3. VSS, no c/o pain or discomfort at this time, resp even and unlabored. Gauze/tegaderm dressing to R UA is CDI. No active bleeding. No hematoma noted. Post procedure protocol reviewed with patient. Understanding verbalized.  Nurse call bell within reach. Will continue to monitor per post procedure protocol.

## 2017-05-26 NOTE — OP NOTE
DATE OF PROCEDURE:  05/25/2017    PROCEDURE TYPE:  Fistulogram of right brachiocephalic AV fistula.    INDICATION:  Prolonged bleeding after dialysis.    :  Grayson Hubbard M.D.    POSTPROCEDURE DIAGNOSES:  1.  Superior venacavogram.  2.  Subclavian venogram.  3.  Axillary venogram, in-stent axillary venogram and cephalic arch in-stent   restenosis and in-stent stenosis 60%, balloon angioplastied with 10 mm x 4 cm   [place] balloon and reflux arteriogram revealed no evidence of any inflow   stenosis, but unable to visualize the brachial artery because of the brisk flow   and rest of the cephalic vein was patent.    PROCEDURE NOTE IN DETAIL:  After informed consent was obtained from Mr. Mendes,   he was brought into [facility] and placed in a supine position.  The area of   his right brachiocephalic fistula was cleaned and draped in the usual sterile   fashion.  After achieving local anesthesia with lidocaine and epinephrine,   access was cannulated with a micropuncture needle in antegrade direction using a   torque device. Then, a Mandrel wire was advanced and a 5-Brazilian sheath was   established.  Multiple angiographic runs revealed there was patent superior vena   cava and subclavian vein.  There was stenosis of 30%.  Followed by that   axillary vein stenosis of 60%.  Followed by that, a cephalic arch in-stent   stenosis and in-stent restenosis of 60%, then rest of the cephalic vein was   patent.  Given significant symptoms of prolonged bleeding after dialysis needle   withdrawal, I decided to intervene the lesion initially and Glidewires were   advanced under fluoroscopy guidance into the SVC.  Followed by that, a 5-Brazilian   sheath was exchanged for a 7-Brazilian sheath.  Then the cephalic arch in-stent   stenosis was balloon angioplastied proximally and distally with 10 mm x 4 cm   [place] balloon and also the subclavian vein and his axillary vein was balloon   angioplastied with 10 mm x 4 cm balloon.   Post-angioplasty angiogram, there were   excellent results with residual stenosis of less than around 10%.  Given no   evidence of any central stenosis, I did not intervene the subclavian and   innominate vein stenosis.  In any case, the patient tolerated the procedure   well.  There was a brisk flow into the outflow and balloon and wire was removed   and prior to that a reflux arteriogram revealed no evidence of any inflow   stenosis, unable to visualize the brachial arteries second to the brisk flow.    Then, a 7-Cambodian sheath was removed after securing with 3-0 Prolene.  No   immediate complications.  Estimated blood loss was 5 mL.  The patient tolerated   the procedure well and discharged from [facility] in a stable condition.    PLAN:  Per KDOQI recommendations, we will do surveillance and monitoring.  If   there is any abnormality, we will bring him back early, otherwise, we will   follow him in six months.      DRK/HN  dd: 05/25/2017 15:37:53 (CDT)  td: 05/25/2017 23:24:48 (CDT)  Doc ID   #[id number]  Job ID #[id number]    CC:

## 2017-06-08 ENCOUNTER — OFFICE VISIT (OUTPATIENT)
Dept: PODIATRY | Facility: CLINIC | Age: 52
End: 2017-06-08
Payer: MEDICARE

## 2017-06-08 DIAGNOSIS — Z89.512 S/P BKA (BELOW KNEE AMPUTATION) UNILATERAL, LEFT: ICD-10-CM

## 2017-06-08 DIAGNOSIS — B35.1 ONYCHOMYCOSIS DUE TO DERMATOPHYTE: ICD-10-CM

## 2017-06-08 DIAGNOSIS — E11.51 PERIPHERAL VASCULAR DISEASE IN DIABETES MELLITUS: ICD-10-CM

## 2017-06-08 DIAGNOSIS — L84 CORN OR CALLUS: ICD-10-CM

## 2017-06-08 DIAGNOSIS — N18.6 ESRD ON HEMODIALYSIS: Primary | ICD-10-CM

## 2017-06-08 DIAGNOSIS — Z99.2 ESRD ON HEMODIALYSIS: Primary | ICD-10-CM

## 2017-06-08 PROCEDURE — 11720 DEBRIDE NAIL 1-5: CPT | Mod: Q7,59,S$PBB, | Performed by: PODIATRIST

## 2017-06-08 PROCEDURE — 11720 DEBRIDE NAIL 1-5: CPT | Mod: Q7,59,PBBFAC | Performed by: PODIATRIST

## 2017-06-08 PROCEDURE — 99999 PR PBB SHADOW E&M-EST. PATIENT-LVL I: CPT | Mod: PBBFAC,,, | Performed by: PODIATRIST

## 2017-06-08 PROCEDURE — 99499 UNLISTED E&M SERVICE: CPT | Mod: S$PBB,,, | Performed by: PODIATRIST

## 2017-06-08 PROCEDURE — 99211 OFF/OP EST MAY X REQ PHY/QHP: CPT | Mod: PBBFAC | Performed by: PODIATRIST

## 2017-06-08 PROCEDURE — 11055 PARING/CUTG B9 HYPRKER LES 1: CPT | Mod: Q7,PBBFAC | Performed by: PODIATRIST

## 2017-06-08 PROCEDURE — 11055 PARING/CUTG B9 HYPRKER LES 1: CPT | Mod: Q7,S$PBB,, | Performed by: PODIATRIST

## 2017-06-08 NOTE — PROGRESS NOTES
Subjective:      Patient ID: Vaughn Retana is a 51 y.o. male.    Chief Complaint: ESRD on hemodialysis (rt foot 3/09/17 pcp Dr Vazquez) and Diabetes Mellitus    Vaughn is a 51 y.o. male who presents to the clinic for evaluation and treatment of high risk feet. Vaughn has a past medical history of Amputation stump pain (9/10/2013); Aspiration pneumonia (7/27/2015); Asterixis (11/8/2016); C. 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 The patient's chief complaint is long, thick toenails. This patient has documented high risk feet requiring routine maintenance secondary to peripheral vascular disease.    PCP: Concepcion Landeros MD    Date Last Seen by PCP:   Chief Complaint   Patient presents with    ESRD on hemodialysis     rt foot 3/09/17 pcp Dr Vazquez    Diabetes Mellitus         Hemoglobin A1C   Date Value Ref Range Status   04/01/2012 text % Final     Comment:     Hemoglobin A1C:   SENT TO [facility]   Possible contamination of HgbA1C by a Hemoglobin variant. This   specimen was sent to [facility] for analysis by an   alternate method.    05/06/2009 6.3 (H) 4.0 - 6.2 % Final        Review of Systems   Constitution: Negative for chills, decreased appetite and fever.   Cardiovascular: Negative for leg swelling.   Skin: Positive for dry skin and nail changes.   Musculoskeletal: Negative for arthritis, joint pain, joint swelling and myalgias.   Gastrointestinal: Negative for nausea and vomiting.   Neurological: Positive for paresthesias. Negative for loss of balance.           Objective:      Physical Exam   Constitutional: He is oriented to person, place, and time. He appears well-developed and well-nourished.   Cardiovascular:   Dorsalis pedis and posterior tibial pulses are diminished R       Musculoskeletal: Normal range of motion. He exhibits no edema or tenderness.   Adequate joint range of motion without pain, limitation, nor crepitation R joints. Muscle strength is 5/5 in all groups R    S/p L BKA     Neurological: He is alert and oriented to person, place, and time.   [place] 5.07 monofilamant testing is diminished [place] feet. Sharp/dull sensation diminished Bilaterally. Light touch absent Bilaterally.       Skin: Skin is warm and dry. No ecchymosis and no lesion noted. No erythema.   Nails x5 are elongated by  1-4mm's, thickened by 1-6 mm's, dystrophic, and are darkened in  coloration . Xerosis Bilaterally. No open lesions noted.    Hyperkeratotic tissue noted to medial HIPJ R     Psychiatric: He has a normal mood and affect. His behavior is normal.             Assessment:       Encounter Diagnoses   Name Primary?    ESRD on hemodialysis Yes    Peripheral vascular disease in diabetes mellitus     Onychomycosis due to dermatophyte     Corn or callus     S/P BKA (below knee amputation) unilateral, left          Plan:       Vaughn was seen today for esrd on hemodialysis and diabetes mellitus.    Diagnoses and all orders for this visit:    ESRD on hemodialysis    Peripheral vascular disease in diabetes mellitus    Onychomycosis due to dermatophyte    Corn or callus    S/P  BKA (below knee amputation) unilateral, left      I counseled the patient on his conditions, their implications and medical management.        - Shoe inspection. Diabetic Foot Education. Patient reminded of the importance of good nutrition and blood sugar control to help prevent podiatric complications of diabetes. Patient instructed on proper foot hygeine. We discussed wearing proper shoe gear, daily foot inspections, never walking without protective shoe gear, never putting sharp instruments to feet, routine podiatric nail visits every 2-3 months.      - With patient's permission, nails were aggressively reduced and debrided x 5 to their soft tissue attachment mechanically and with electric , removing all offending nail and debris. Patient relates relief following the procedure. He will continue to monitor the areas daily, inspect his feet, wear protective shoe gear when ambulatory, moisturizer to maintain skin integrity and follow in this office in approximately 2-3 months, sooner p.r.n.    - After cleansing the  area w/ alcohol prep pad the above mentioned hyperkeratosis was trimmed utilizing No 15 scapel, to a smooth base with out incident. Patient tolerated this  well and reported comfort to the area of R great toe

## 2017-06-21 VITALS
BODY MASS INDEX: 27.44 KG/M2 | DIASTOLIC BLOOD PRESSURE: 78 MMHG | HEART RATE: 74 BPM | RESPIRATION RATE: 20 BRPM | WEIGHT: 170 LBS | OXYGEN SATURATION: 98 % | SYSTOLIC BLOOD PRESSURE: 112 MMHG

## 2017-06-22 NOTE — PROGRESS NOTES
AAOx3.  Denies discomfort. Lives w/ family who was not present for visit.  NIHSS-0; does not smoke; refrains from adding salt but does eat salty and fast food; hemodialysis MWF; lt bka;  Education provided on goal of stroke mobile, s/s of stroke, diet, exercise, medications.  Client verbalizes understanding of all instructions provided.  Encouraged to utilize stroke team for any concern.

## 2017-06-23 PROCEDURE — 96365 THER/PROPH/DIAG IV INF INIT: CPT

## 2017-06-23 PROCEDURE — 99285 EMERGENCY DEPT VISIT HI MDM: CPT | Mod: 25

## 2017-06-23 PROCEDURE — 96375 TX/PRO/DX INJ NEW DRUG ADDON: CPT

## 2017-06-23 PROCEDURE — 99285 EMERGENCY DEPT VISIT HI MDM: CPT | Mod: GC,,, | Performed by: EMERGENCY MEDICINE

## 2017-06-24 ENCOUNTER — HOSPITAL ENCOUNTER (OUTPATIENT)
Facility: HOSPITAL | Age: 52
Discharge: HOME | End: 2017-06-24
Attending: EMERGENCY MEDICINE | Admitting: HOSPITALIST
Payer: MEDICARE

## 2017-06-24 VITALS
DIASTOLIC BLOOD PRESSURE: 87 MMHG | SYSTOLIC BLOOD PRESSURE: 144 MMHG | HEART RATE: 77 BPM | WEIGHT: 162.25 LBS | BODY MASS INDEX: 26.08 KG/M2 | OXYGEN SATURATION: 95 % | RESPIRATION RATE: 18 BRPM | HEIGHT: 66 IN | TEMPERATURE: 98 F

## 2017-06-24 DIAGNOSIS — R11.2 NON-INTRACTABLE VOMITING WITH NAUSEA, UNSPECIFIED VOMITING TYPE: Primary | ICD-10-CM

## 2017-06-24 DIAGNOSIS — K92.0 HEMATEMESIS: ICD-10-CM

## 2017-06-24 DIAGNOSIS — K92.0 HEMATEMESIS WITH NAUSEA: ICD-10-CM

## 2017-06-24 DIAGNOSIS — R06.6 HICCUPS: ICD-10-CM

## 2017-06-24 DIAGNOSIS — R11.2 NAUSEA AND VOMITING, INTRACTABILITY OF VOMITING NOT SPECIFIED, UNSPECIFIED VOMITING TYPE: ICD-10-CM

## 2017-06-24 PROBLEM — K20.90 ESOPHAGITIS DETERMINED BY ENDOSCOPY: Chronic | Status: ACTIVE | Noted: 2017-06-24

## 2017-06-24 LAB
ABO + RH BLD: NORMAL
ALBUMIN SERPL BCP-MCNC: 3.5 G/DL
ALP SERPL-CCNC: 241 U/L
ALT SERPL W/O P-5'-P-CCNC: 26 U/L
ANION GAP SERPL CALC-SCNC: 17 MMOL/L
APTT BLDCRRT: 23.6 SEC
AST SERPL-CCNC: 28 U/L
BASOPHILS # BLD AUTO: 0.01 K/UL
BASOPHILS NFR BLD: 0.1 %
BILIRUB SERPL-MCNC: 0.6 MG/DL
BLD GP AB SCN CELLS X3 SERPL QL: NORMAL
BUN SERPL-MCNC: 26 MG/DL
CALCIUM SERPL-MCNC: 10.7 MG/DL
CHLORIDE SERPL-SCNC: 94 MMOL/L
CO2 SERPL-SCNC: 33 MMOL/L
CREAT SERPL-MCNC: 5.9 MG/DL
DIFFERENTIAL METHOD: ABNORMAL
EOSINOPHIL # BLD AUTO: 0.3 K/UL
EOSINOPHIL NFR BLD: 4.9 %
ERYTHROCYTE [DISTWIDTH] IN BLOOD BY AUTOMATED COUNT: 13.5 %
ERYTHROCYTE [DISTWIDTH] IN BLOOD BY AUTOMATED COUNT: 13.5 %
EST. GFR  (AFRICAN AMERICAN): 11.6 ML/MIN/1.73 M^2
EST. GFR  (NON AFRICAN AMERICAN): 10 ML/MIN/1.73 M^2
ESTIMATED AVG GLUCOSE: 100 MG/DL
GLUCOSE SERPL-MCNC: 115 MG/DL
HBA1C MFR BLD HPLC: 5.1 %
HCT VFR BLD AUTO: 34.2 %
HCT VFR BLD AUTO: 38.3 %
HGB BLD-MCNC: 11.7 G/DL
HGB BLD-MCNC: 13.1 G/DL
INR PPP: 1.1
LACTATE SERPL-SCNC: 1.3 MMOL/L
LIPASE SERPL-CCNC: 44 U/L
LYMPHOCYTES # BLD AUTO: 1.7 K/UL
LYMPHOCYTES NFR BLD: 24.7 %
MAGNESIUM SERPL-MCNC: 2 MG/DL
MCH RBC QN AUTO: 32.5 PG
MCH RBC QN AUTO: 32.5 PG
MCHC RBC AUTO-ENTMCNC: 34.2 %
MCHC RBC AUTO-ENTMCNC: 34.2 %
MCV RBC AUTO: 95 FL
MCV RBC AUTO: 95 FL
MONOCYTES # BLD AUTO: 0.7 K/UL
MONOCYTES NFR BLD: 10.2 %
NEUTROPHILS # BLD AUTO: 4.1 K/UL
NEUTROPHILS NFR BLD: 59.8 %
PHOSPHATE SERPL-MCNC: 6.1 MG/DL
PLATELET # BLD AUTO: 147 K/UL
PLATELET # BLD AUTO: 152 K/UL
PMV BLD AUTO: 10.9 FL
PMV BLD AUTO: 11 FL
POCT GLUCOSE: 133 MG/DL (ref 70–110)
POCT GLUCOSE: 135 MG/DL (ref 70–110)
POTASSIUM SERPL-SCNC: 3.7 MMOL/L
PROT SERPL-MCNC: 9.4 G/DL
PROTHROMBIN TIME: 11.4 SEC
RBC # BLD AUTO: 3.6 M/UL
RBC # BLD AUTO: 4.03 M/UL
SODIUM SERPL-SCNC: 144 MMOL/L
WBC # BLD AUTO: 6.15 K/UL
WBC # BLD AUTO: 6.87 K/UL

## 2017-06-24 PROCEDURE — C9113 INJ PANTOPRAZOLE SODIUM, VIA: HCPCS | Performed by: PHYSICIAN ASSISTANT

## 2017-06-24 PROCEDURE — 86900 BLOOD TYPING SEROLOGIC ABO: CPT

## 2017-06-24 PROCEDURE — 25000003 PHARM REV CODE 250: Performed by: PHYSICIAN ASSISTANT

## 2017-06-24 PROCEDURE — 36415 COLL VENOUS BLD VENIPUNCTURE: CPT

## 2017-06-24 PROCEDURE — 83036 HEMOGLOBIN GLYCOSYLATED A1C: CPT

## 2017-06-24 PROCEDURE — 82962 GLUCOSE BLOOD TEST: CPT | Mod: 91

## 2017-06-24 PROCEDURE — 99220 PR INITIAL OBSERVATION CARE,LEVL III: CPT | Mod: ,,, | Performed by: PHYSICIAN ASSISTANT

## 2017-06-24 PROCEDURE — 85027 COMPLETE CBC AUTOMATED: CPT

## 2017-06-24 PROCEDURE — 99214 OFFICE O/P EST MOD 30 MIN: CPT | Mod: GC,,, | Performed by: INTERNAL MEDICINE

## 2017-06-24 PROCEDURE — 63600175 PHARM REV CODE 636 W HCPCS: Performed by: PHYSICIAN ASSISTANT

## 2017-06-24 PROCEDURE — 84100 ASSAY OF PHOSPHORUS: CPT

## 2017-06-24 PROCEDURE — G0378 HOSPITAL OBSERVATION PER HR: HCPCS

## 2017-06-24 PROCEDURE — 83690 ASSAY OF LIPASE: CPT

## 2017-06-24 PROCEDURE — 83735 ASSAY OF MAGNESIUM: CPT

## 2017-06-24 PROCEDURE — 25000003 PHARM REV CODE 250: Performed by: EMERGENCY MEDICINE

## 2017-06-24 PROCEDURE — 63600175 PHARM REV CODE 636 W HCPCS: Performed by: EMERGENCY MEDICINE

## 2017-06-24 PROCEDURE — 85025 COMPLETE CBC W/AUTO DIFF WBC: CPT

## 2017-06-24 PROCEDURE — 83605 ASSAY OF LACTIC ACID: CPT

## 2017-06-24 PROCEDURE — 86901 BLOOD TYPING SEROLOGIC RH(D): CPT

## 2017-06-24 PROCEDURE — 85610 PROTHROMBIN TIME: CPT

## 2017-06-24 PROCEDURE — C9113 INJ PANTOPRAZOLE SODIUM, VIA: HCPCS | Performed by: EMERGENCY MEDICINE

## 2017-06-24 PROCEDURE — 80053 COMPREHEN METABOLIC PANEL: CPT

## 2017-06-24 PROCEDURE — 85730 THROMBOPLASTIN TIME PARTIAL: CPT

## 2017-06-24 RX ORDER — LOPERAMIDE HYDROCHLORIDE 2 MG/1
2 CAPSULE ORAL
Status: DISCONTINUED | OUTPATIENT
Start: 2017-06-24 | End: 2017-06-24 | Stop reason: HOSPADM

## 2017-06-24 RX ORDER — ACETAMINOPHEN 325 MG/1
650 TABLET ORAL EVERY 8 HOURS PRN
Status: DISCONTINUED | OUTPATIENT
Start: 2017-06-24 | End: 2017-06-24 | Stop reason: HOSPADM

## 2017-06-24 RX ORDER — INSULIN ASPART 100 [IU]/ML
0-5 INJECTION, SOLUTION INTRAVENOUS; SUBCUTANEOUS EVERY 6 HOURS PRN
Status: DISCONTINUED | OUTPATIENT
Start: 2017-06-24 | End: 2017-06-24 | Stop reason: HOSPADM

## 2017-06-24 RX ORDER — OMEPRAZOLE 40 MG/1
40 CAPSULE, DELAYED RELEASE ORAL
Qty: 90 CAPSULE | Refills: 3 | Status: SHIPPED | OUTPATIENT
Start: 2017-06-24 | End: 2017-10-25 | Stop reason: SDUPTHER

## 2017-06-24 RX ORDER — HYDRALAZINE HYDROCHLORIDE 25 MG/1
50 TABLET, FILM COATED ORAL EVERY 8 HOURS PRN
Status: DISCONTINUED | OUTPATIENT
Start: 2017-06-24 | End: 2017-06-24 | Stop reason: HOSPADM

## 2017-06-24 RX ORDER — ONDANSETRON 2 MG/ML
8 INJECTION INTRAMUSCULAR; INTRAVENOUS
Status: COMPLETED | OUTPATIENT
Start: 2017-06-24 | End: 2017-06-24

## 2017-06-24 RX ORDER — ATORVASTATIN CALCIUM 20 MG/1
40 TABLET, FILM COATED ORAL DAILY
Status: DISCONTINUED | OUTPATIENT
Start: 2017-06-24 | End: 2017-06-24 | Stop reason: HOSPADM

## 2017-06-24 RX ORDER — PANTOPRAZOLE SODIUM 40 MG/10ML
80 INJECTION, POWDER, LYOPHILIZED, FOR SOLUTION INTRAVENOUS
Status: COMPLETED | OUTPATIENT
Start: 2017-06-24 | End: 2017-06-24

## 2017-06-24 RX ORDER — CINACALCET 30 MG/1
60 TABLET, FILM COATED ORAL
Status: DISCONTINUED | OUTPATIENT
Start: 2017-06-24 | End: 2017-06-24 | Stop reason: HOSPADM

## 2017-06-24 RX ORDER — AMLODIPINE BESYLATE 10 MG/1
10 TABLET ORAL EVERY MORNING
Status: DISCONTINUED | OUTPATIENT
Start: 2017-06-24 | End: 2017-06-24 | Stop reason: HOSPADM

## 2017-06-24 RX ORDER — GLUCAGON 1 MG
1 KIT INJECTION
Status: DISCONTINUED | OUTPATIENT
Start: 2017-06-24 | End: 2017-06-24 | Stop reason: HOSPADM

## 2017-06-24 RX ORDER — PROMETHAZINE HYDROCHLORIDE 12.5 MG/1
25 TABLET ORAL EVERY 6 HOURS PRN
Status: DISCONTINUED | OUTPATIENT
Start: 2017-06-24 | End: 2017-06-24 | Stop reason: HOSPADM

## 2017-06-24 RX ORDER — LISINOPRIL 20 MG/1
40 TABLET ORAL NIGHTLY
Status: DISCONTINUED | OUTPATIENT
Start: 2017-06-24 | End: 2017-06-24 | Stop reason: HOSPADM

## 2017-06-24 RX ORDER — RAMELTEON 8 MG/1
8 TABLET ORAL NIGHTLY PRN
Status: DISCONTINUED | OUTPATIENT
Start: 2017-06-24 | End: 2017-06-24 | Stop reason: HOSPADM

## 2017-06-24 RX ORDER — ONDANSETRON HYDROCHLORIDE 8 MG/1
8 TABLET, FILM COATED ORAL EVERY 8 HOURS PRN
Qty: 90 TABLET | Refills: 4 | Status: SHIPPED | OUTPATIENT
Start: 2017-06-24 | End: 2018-03-06

## 2017-06-24 RX ORDER — CHLORPROMAZINE HYDROCHLORIDE 25 MG/1
25 TABLET, FILM COATED ORAL 3 TIMES DAILY PRN
Status: DISCONTINUED | OUTPATIENT
Start: 2017-06-24 | End: 2017-06-24 | Stop reason: HOSPADM

## 2017-06-24 RX ORDER — LISINOPRIL 20 MG/1
40 TABLET ORAL
Status: COMPLETED | OUTPATIENT
Start: 2017-06-24 | End: 2017-06-24

## 2017-06-24 RX ORDER — CARVEDILOL 25 MG/1
25 TABLET ORAL 2 TIMES DAILY WITH MEALS
Status: DISCONTINUED | OUTPATIENT
Start: 2017-06-24 | End: 2017-06-24 | Stop reason: HOSPADM

## 2017-06-24 RX ORDER — CALCIUM CARBONATE 500(1250)
500 TABLET ORAL
Status: DISCONTINUED | OUTPATIENT
Start: 2017-06-24 | End: 2017-06-24 | Stop reason: HOSPADM

## 2017-06-24 RX ORDER — PANTOPRAZOLE SODIUM 40 MG/10ML
40 INJECTION, POWDER, LYOPHILIZED, FOR SOLUTION INTRAVENOUS 2 TIMES DAILY
Status: DISCONTINUED | OUTPATIENT
Start: 2017-06-24 | End: 2017-06-24 | Stop reason: HOSPADM

## 2017-06-24 RX ORDER — POLYETHYLENE GLYCOL 3350 17 G/17G
17 POWDER, FOR SOLUTION ORAL 3 TIMES DAILY PRN
Status: DISCONTINUED | OUTPATIENT
Start: 2017-06-24 | End: 2017-06-24 | Stop reason: HOSPADM

## 2017-06-24 RX ORDER — ONDANSETRON 2 MG/ML
4 INJECTION INTRAMUSCULAR; INTRAVENOUS EVERY 8 HOURS PRN
Status: DISCONTINUED | OUTPATIENT
Start: 2017-06-24 | End: 2017-06-24 | Stop reason: HOSPADM

## 2017-06-24 RX ORDER — IPRATROPIUM BROMIDE AND ALBUTEROL SULFATE 2.5; .5 MG/3ML; MG/3ML
3 SOLUTION RESPIRATORY (INHALATION) EVERY 4 HOURS PRN
Status: DISCONTINUED | OUTPATIENT
Start: 2017-06-24 | End: 2017-06-24 | Stop reason: HOSPADM

## 2017-06-24 RX ORDER — CARVEDILOL 12.5 MG/1
25 TABLET ORAL ONCE
Status: COMPLETED | OUTPATIENT
Start: 2017-06-24 | End: 2017-06-24

## 2017-06-24 RX ADMIN — CARVEDILOL 25 MG: 12.5 TABLET, FILM COATED ORAL at 03:06

## 2017-06-24 RX ADMIN — LISINOPRIL 40 MG: 20 TABLET ORAL at 03:06

## 2017-06-24 RX ADMIN — PANTOPRAZOLE SODIUM 40 MG: 40 INJECTION, POWDER, FOR SOLUTION INTRAVENOUS at 09:06

## 2017-06-24 RX ADMIN — PANTOPRAZOLE SODIUM 80 MG: 40 INJECTION, POWDER, FOR SOLUTION INTRAVENOUS at 01:06

## 2017-06-24 RX ADMIN — ATORVASTATIN CALCIUM 40 MG: 20 TABLET, FILM COATED ORAL at 09:06

## 2017-06-24 RX ADMIN — ONDANSETRON 8 MG: 2 INJECTION INTRAMUSCULAR; INTRAVENOUS at 01:06

## 2017-06-24 RX ADMIN — AMLODIPINE BESYLATE 10 MG: 10 TABLET ORAL at 06:06

## 2017-06-24 RX ADMIN — CARVEDILOL 25 MG: 25 TABLET, FILM COATED ORAL at 09:06

## 2017-06-24 RX ADMIN — CALCIUM 500 MG: 500 TABLET ORAL at 12:06

## 2017-06-24 RX ADMIN — Medication 8 MG/HR: at 03:06

## 2017-06-24 NOTE — ED TRIAGE NOTES
Vaughn Retana, a 52 y.o. male presents to the ED complaining of low abdominal pain with hematemesis for three days. Pt states vomit is a dark brown color. Pt complaining of belching. Pt denies chest pain, SOB, diarrhea, fever      Chief Complaint   Patient presents with    Abdominal Pain     low abdominal pain for 3 days ago    Hematemesis     last time a hour ago after every time he eats     Review of patient's allergies indicates:   Allergen Reactions    Fosrenol [lanthanum] Nausea And Vomiting     Nausea and vomiting     Past Medical History:   Diagnosis Date    Amputation stump pain 9/10/2013    Aspiration pneumonia 7/27/2015    Asterixis 11/8/2016    C. difficile colitis 8/7/2015    Cholelithiasis without obstruction 8/25/2015    Chronic diastolic heart failure     2-23-17   1 - Low normal to mildly depressed left ventricular systolic function (EF 50-55%).    2 - Right ventricular enlargement with mildly depressed systolic function.    3 - Left ventricular diastolic dysfunction.    4 - Right atrial enlargement.    5 - Severe tricuspid regurgitation.    6 - Pulmonary hypertension. The estimated PA systolic pressure is 86 mmHg.    7 - Increased central venous pressure.    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

## 2017-06-24 NOTE — ASSESSMENT & PLAN NOTE
- Continue amlodipine 10mg daily, Coreg 25mg BID, lisinopril 40mg qHS, and hydralazine 50mg TID PRN SBP >180.

## 2017-06-24 NOTE — SUBJECTIVE & OBJECTIVE
Past Medical History:   Diagnosis Date    Amputation stump pain 9/10/2013    Aspiration pneumonia 7/27/2015    Asterixis 11/8/2016    C. difficile colitis 8/7/2015    Cholelithiasis without obstruction 8/25/2015    Chronic diastolic heart failure     2-23-17   1 - Low normal to mildly depressed left ventricular systolic function (EF 50-55%).    2 - Right ventricular enlargement with mildly depressed systolic function.    3 - Left ventricular diastolic dysfunction.    4 - Right atrial enlargement.    5 - Severe tricuspid regurgitation.    6 - Pulmonary hypertension. The estimated PA systolic pressure is 86 mmHg.    7 - Increased central venous pressure.    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    Metabolic acidosis, IAG, reduced excretion of inorganic acids     Myoclonic jerking 9/20/2016    Secondary hyperparathyroidism (of renal origin)     Secondary pulmonary hypertension 3/23/2017    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    TB lung, latent 08/25/2015    Negative Quantiferon Gold 3-23-17       Past Surgical History:   Procedure Laterality Date    COLONOSCOPY      COLONOSCOPY N/A 4/4/2017    Procedure: COLONOSCOPY;  Surgeon: Walker Stern MD;  Location: [facility] ([address] [place] [address]);  Service: Endoscopy;  Laterality: N/A;  PA Systolic Pressure 85.56. HD Patient MWF, K+ lab prior to procedure.     FOOT AMPUTATION THROUGH METATARSAL      left foot    LEG AMPUTATION THROUGH KNEE  12/18/2013     left BKA    R AVF  9/12/12       Review of patient's allergies indicates:   Allergen Reactions    Fosrenol [lanthanum] Nausea And Vomiting     Nausea and vomiting     Family History     Problem Relation (Age of Onset)    Alcohol abuse Maternal Grandmother    Diabetes Brother, Maternal Grandfather    Early death Mother    Heart disease Father    Hyperlipidemia Father    Hypertension Father    Kidney disease Father        Social History Main Topics    Smoking status: Former Smoker     Packs/day: 1.00     Years: 10.00    Smokeless tobacco: Never Used    Alcohol use No    Drug use: No    Sexual activity: Not on file     Review of Systems   Constitutional: Negative for activity change and appetite change.   HENT: Negative for ear discharge and facial swelling.    Eyes: Negative for photophobia and redness.   Respiratory: Negative for wheezing and stridor.    Cardiovascular: Negative for chest pain and palpitations.   Gastrointestinal: Positive for abdominal pain, nausea and vomiting. Negative for abdominal distention, anal bleeding, blood in stool, constipation, diarrhea and rectal pain.   Endocrine: Negative for cold intolerance and heat intolerance.   Genitourinary: Negative for dysuria and frequency.   Skin: Negative for color change and rash.   Allergic/Immunologic: Negative for food allergies.   Neurological: Negative for seizures and numbness.   Hematological: Does not bruise/bleed easily.   Psychiatric/Behavioral: Negative for agitation and behavioral problems.     Objective:     Vital Signs (Most Recent):  Temp: 98 °F (36.7 °C) (06/24/17 0800)  Pulse: 84 (06/24/17 0800)  Resp: 19 (06/24/17 0800)  BP: 134/84 (06/24/17 0800)  SpO2: 95 % (06/24/17 0800) Vital Signs (24h Range):  Temp:  [98 °F (36.7 °C)-98.4 °F (36.9 °C)] 98 °F (36.7 °C)  Pulse:  [] 84  Resp:  [19-20] 19  SpO2:  [95 %-100 %] 95 %  BP: (134-206)/() 134/84     Weight: 73.6 kg (162 lb 4.1 oz) (06/24/17 0509)  Body mass index is  26.19 kg/m².    No intake or output data in the 24 hours ending 06/24/17 0951    Lines/Drains/Airways     Drain                 Hemodialysis AV Graft 01/22/11 1828 Right upper arm 2344 days          Peripheral Intravenous Line                 Peripheral IV - Single Lumen 06/24/17 0125 Left Hand less than 1 day                Physical Exam   Constitutional: He is oriented to person, place, and time. He appears well-developed and well-nourished.   HENT:   Head: Normocephalic and atraumatic.   Eyes: Conjunctivae are normal. No scleral icterus.   Neck: Neck supple.   Cardiovascular: Normal rate and regular rhythm.    No murmur heard.  Pulmonary/Chest: Effort normal. No stridor. No respiratory distress. He has no wheezes. He has no rales.   Abdominal: Soft. Bowel sounds are normal. He exhibits no distension and no mass. There is no tenderness. There is no rebound and no guarding. No hernia.   Musculoskeletal: He exhibits no edema or tenderness.   Neurological: He is alert and oriented to person, place, and time.   Skin: Skin is warm and dry. No erythema.   Psychiatric: He has a normal mood and affect.   Vitals reviewed.    Lab Results   Component Value Date    WBC 6.87 06/24/2017    HGB 13.1 (L) 06/24/2017    HCT 38.3 (L) 06/24/2017    MCV 95 06/24/2017     (L) 06/24/2017     Lab Results   Component Value Date     06/24/2017    K 3.7 06/24/2017    CL 94 (L) 06/24/2017    CO2 33 (H) 06/24/2017    BUN 26 (H) 06/24/2017    CREATININE 5.9 (H) 06/24/2017     Lab Results   Component Value Date    ALBUMIN 3.5 06/24/2017    ALT 26 06/24/2017    AST 28 06/24/2017    ALKPHOS 241 (H) 06/24/2017    BILITOT 0.6 06/24/2017     Lab Results   Component Value Date    AFP 5.0 06/02/2015    LIPASE 44 06/24/2017    AMYLASE 132 (H) 02/23/2017       Imaging:  Reviewed and as noted in HPI.

## 2017-06-24 NOTE — CONSULTS
"[facility]  Gastroenterology  Consult Note    Patient Name: Vaughn Retana  MRN: [id number]  Admission Date: 6/24/2017  Hospital Length of Stay: 0 days  Code Status: Full Code   Attending Provider: Trev Enrique MD   Consulting Provider: Janet Juarez MD  Primary Care Physician: Concepcion Landeros MD  Principal Problem:Hematemesis    Inpatient consult to Gastroenterology  Consult performed by: JANET JUAREZ  Consult ordered by: LESTER MIXON  Reason for consult: hematemesis        Subjective:     HPI:  Vaughn Retana is a 52 y.o. male with h/o ESRD, esophagitis, L AKA, DM II, HTN, HLD, chronic diastolic HF, and ICH.  He presents with hematemesis.  Patient states that he began having nausea and vomiting a few days ago and now has some blood in his vomit.  He states that he has had this before.  Per ER note, patient states that he has been having black stools for "a while now."   but he denies having dark stools.  He states that he has had no further episodes of vomiting since receiving Zofran in the ER. He is stable in ER/obs, hgb is above his baseline with 13. His most recent EGD/colon in 3/2017. EGD showed LA Grade D reflux esophagitis, small hiatal hernia. Colonoscopy showed one 3 mm polyp in the transverse colon and one 8mm polyp in the sigmoid colon both resected. Abdominal xray on admission unremarkable. He is not on any blood thinners.     Past Medical History:   Diagnosis Date    Amputation stump pain 9/10/2013    Aspiration pneumonia 7/27/2015    Asterixis 11/8/2016    C. difficile colitis 8/7/2015    Cholelithiasis without obstruction 8/25/2015    Chronic diastolic heart failure     2-23-17   1 - Low normal to mildly depressed left ventricular systolic function (EF 50-55%).    2 - Right ventricular enlargement with mildly depressed systolic function.    3 - Left ventricular diastolic dysfunction.    4 - Right atrial enlargement.    5 - Severe tricuspid " regurgitation.    6 - Pulmonary hypertension. The estimated PA systolic pressure is 86 mmHg.    7 - Increased central venous pressure.     Chronic low back pain 12/1/2015    Closed head injury 9/8/2016    Diabetic neuropathy     ESRD on hemodialysis 2/7/2013    MWF at 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    Metabolic acidosis, IAG, reduced excretion of inorganic acids     Myoclonic jerking 9/20/2016    Secondary hyperparathyroidism (of renal origin)     Secondary pulmonary hypertension 3/23/2017    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    TB lung, latent 08/25/2015    Negative Quantiferon Gold 3-23-17       Past Surgical History:   Procedure Laterality Date    COLONOSCOPY      COLONOSCOPY N/A 4/4/2017    Procedure: COLONOSCOPY;  Surgeon: Walker Stern MD;  Location: [facility] ([address] [place] [address]);  Service: Endoscopy;  Laterality: N/A;  PA Systolic Pressure 85.56. HD Patient MWF, K+ lab prior to procedure.     FOOT AMPUTATION THROUGH METATARSAL      left foot    LEG AMPUTATION THROUGH KNEE  12/18/2013    left BKA    R AVF  9/12/12       Review of patient's allergies indicates:   Allergen Reactions    Fosrenol [lanthanum] Nausea And Vomiting     Nausea and vomiting     Family History     Problem Relation (Age of Onset)    Alcohol abuse Maternal Grandmother    Diabetes Brother, Maternal Grandfather    Early death Mother    Heart disease Father    Hyperlipidemia Father    Hypertension Father    Kidney disease Father        Social History Main Topics    Smoking status: Former Smoker     Packs/day: 1.00     Years: 10.00     Smokeless tobacco: Never Used    Alcohol use No    Drug use: No    Sexual activity: Not on file     Review of Systems   Constitutional: Negative for activity change and appetite change.   HENT: Negative for ear discharge and facial swelling.    Eyes: Negative for photophobia and redness.   Respiratory: Negative for wheezing and stridor.    Cardiovascular: Negative for chest pain and palpitations.   Gastrointestinal: Positive for abdominal pain, nausea and vomiting. Negative for abdominal distention, anal bleeding, blood in stool, constipation, diarrhea and rectal pain.   Endocrine: Negative for cold intolerance and heat intolerance.   Genitourinary: Negative for dysuria and frequency.   Skin: Negative for color change and rash.   Allergic/Immunologic: Negative for food allergies.   Neurological: Negative for seizures and numbness.   Hematological: Does not bruise/bleed easily.   Psychiatric/Behavioral: Negative for agitation and behavioral problems.     Objective:     Vital Signs (Most Recent):  Temp: 98 °F (36.7 °C) (06/24/17 0800)  Pulse: 84 (06/24/17 0800)  Resp: 19 (06/24/17 0800)  BP: 134/84 (06/24/17 0800)  SpO2: 95 % (06/24/17 0800) Vital Signs (24h Range):  Temp:  [98 °F (36.7 °C)-98.4 °F (36.9 °C)] 98 °F (36.7 °C)  Pulse:  [] 84  Resp:  [19-20] 19  SpO2:  [95 %-100 %] 95 %  BP: (134-206)/() 134/84     Weight: 73.6 kg (162 lb 4.1 oz) (06/24/17 0509)  Body mass index is 26.19 kg/m².    No intake or output data in the 24 hours ending 06/24/17 0951    Lines/Drains/Airways     Drain                 Hemodialysis AV Graft 01/22/11 1828 Right upper arm 2344 days          Peripheral Intravenous Line                 Peripheral IV - Single Lumen 06/24/17 0125 Left Hand less than 1 day                Physical Exam   Constitutional: He is oriented to person, place, and time. He appears well-developed and well-nourished.   HENT:   Head: Normocephalic and atraumatic.   Eyes: Conjunctivae are normal. No  scleral icterus.   Neck: Neck supple.   Cardiovascular: Normal rate and regular rhythm.    No murmur heard.  Pulmonary/Chest: Effort normal. No stridor. No respiratory distress. He has no wheezes. He has no rales.   Abdominal: Soft. Bowel sounds are normal. He exhibits no distension and no mass. There is no tenderness. There is no rebound and no guarding. No hernia.   Musculoskeletal: He exhibits no edema or tenderness.   Neurological: He is alert and oriented to person, place, and time.   Skin: Skin is warm and dry. No erythema.   Psychiatric: He has a normal mood and affect.   Vitals reviewed.    Lab Results   Component Value Date    WBC 6.87 06/24/2017    HGB 13.1 (L) 06/24/2017    HCT 38.3 (L) 06/24/2017    MCV 95 06/24/2017     (L) 06/24/2017     Lab Results   Component Value Date     06/24/2017    K 3.7 06/24/2017    CL 94 (L) 06/24/2017    CO2 33 (H) 06/24/2017    BUN 26 (H) 06/24/2017    CREATININE 5.9 (H) 06/24/2017     Lab Results   Component Value Date    ALBUMIN 3.5 06/24/2017    ALT 26 06/24/2017    AST 28 06/24/2017    ALKPHOS 241 (H) 06/24/2017    BILITOT 0.6 06/24/2017     Lab Results   Component Value Date    AFP 5.0 06/02/2015    LIPASE 44 06/24/2017    AMYLASE 132 (H) 02/23/2017       Imaging:  Reviewed and as noted in HPI.        Assessment/Plan:     Vaughn Retana is a 52 y.o. male with     * Hematemesis    Trace blood with emesis. Not true hematemesis. Likely due to esophagitis. Does not take PPI at home daily, likely not healing.     Plan:  Protonix IV BID and daily on discharge indefinitely.  Intravascular resuscitation/support with IVFs   Serial H/H's and pRBCs transfusion as indicated  Discontinue all NSAIDs and Heparin products  Please correct any coagulopathy with platelets and FFP to a goal of platelets >50K and INR <2.0  Maintain IV access with 2 large bore IVs  Resume diet  Serial H/H, if no change, would consider outpatient EGD  Please notify GI team if there is  significant change in patient's clinical status              Thank you for your consult. I will follow-up with patient. Please contact us if you have any additional questions.    Akilah Hankins MD  Gastroenterology  [facility]

## 2017-06-24 NOTE — PLAN OF CARE
Problem: Patient Care Overview  Goal: Plan of Care Review  Outcome: Ongoing (interventions implemented as appropriate)  Pt admitted and care plan initiated.NPO x meds.protonix drip in progress.denies nausea or abd pain.safety precautions implemented.bed in low position.call bell in reach.pt has a BKA to left leg.prosthesis at bedside.dialysis fistula with + thrill and bruit.continue plan of care.

## 2017-06-24 NOTE — ASSESSMENT & PLAN NOTE
- No signs of decompensation.  - Last echo in February 2017 showed EF 50% with diastolic dysfunction, severe tricuspid regurg, and PAP 85.56.  - Continue BB and ACEi.

## 2017-06-24 NOTE — ASSESSMENT & PLAN NOTE
Likely due to esophagitis. Does not take PPI at home daily, likely not healing.     Plan:  Protonix IV BID and daily on discharge indefinitely.  Intravascular resuscitation/support with IVFs   Serial H/H's and pRBCs transfusion as indicated  Discontinue all NSAIDs and Heparin products  Please correct any coagulopathy with platelets and FFP to a goal of platelets >50K and INR <2.0  Maintain IV access with 2 large bore IVs  Resume diet  Serial H/H, if no change, would consider outpatient EGD  Please notify GI team if there is significant change in patient's clinical status

## 2017-06-24 NOTE — H&P
"[facility] Medicine  History & Physical    Patient Name: Vaughn Retana  MRN: [id number]  Admission Date: 6/24/2017  Attending Physician: Trev Enrique MD   Primary Care Provider: Concepcion Landeros MD    [facility] Medicine Team: [facility] HOSP MED E Lou Belle PA-C     Patient information was obtained from patient, past medical records and ER records.     Subjective:     Principal Problem:Hematemesis    Chief Complaint:   Chief Complaint   Patient presents with    Abdominal Pain     low abdominal pain for 3 days ago    Hematemesis     last time a hour ago after every time he eats        HPI: Patient is a 52 year old gentleman with a h/o ESRD, esophagitis, L AKA, DM II, HTN, HLD, chronic diastolic HF, and ICH.  He presents with hematemesis.  Patient states that he began having nausea and vomiting a few days ago and now has some blood in his vomit.  He states that he has had this before.  Per ER note, patient states that he has been having black stools for "a while now."  However, he now denies this.  He states that he has had no further episodes of vomiting since receiving Zofran in the ER.  He denies chest pain, SOB, dizziness, palpitations, fever/chills, headache.    Past Medical History:   Diagnosis Date    Amputation stump pain 9/10/2013    Aspiration pneumonia 7/27/2015    Asterixis 11/8/2016    C. difficile colitis 8/7/2015    Cholelithiasis without obstruction 8/25/2015    Chronic diastolic heart failure     2-23-17   1 - Low normal to mildly depressed left ventricular systolic function (EF 50-55%).    2 - Right ventricular enlargement with mildly depressed systolic function.    3 - Left ventricular diastolic dysfunction.    4 - Right atrial enlargement.    5 - Severe tricuspid regurgitation.    6 - Pulmonary hypertension. The estimated PA systolic pressure is 86 mmHg.    7 - Increased central venous pressure.     Chronic low back pain 12/1/2015    Closed head " injury 9/8/2016    Diabetic neuropathy     ESRD on hemodialysis 2/7/2013    MWF at 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    Metabolic acidosis, IAG, reduced excretion of inorganic acids     Myoclonic jerking 9/20/2016    Secondary hyperparathyroidism (of renal origin)     Secondary pulmonary hypertension 3/23/2017    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    TB lung, latent 08/25/2015    Negative Quantiferon Gold 3-23-17       Past Surgical History:   Procedure Laterality Date    COLONOSCOPY      COLONOSCOPY N/A 4/4/2017    Procedure: COLONOSCOPY;  Surgeon: Walker Stern MD;  Location: [facility] ([address] [place] [address]);  Service: Endoscopy;  Laterality: N/A;  PA Systolic Pressure 85.56. HD Patient MWF, K+ lab prior to procedure.     FOOT AMPUTATION THROUGH METATARSAL      left foot    LEG AMPUTATION THROUGH KNEE  12/18/2013    left BKA    R AVF  9/12/12       Review of patient's allergies indicates:   Allergen Reactions    Fosrenol [lanthanum] Nausea And Vomiting     Nausea and vomiting       No current facility-administered medications on file prior to encounter.      Current Outpatient Prescriptions on File Prior to Encounter   Medication Sig    acetaminophen (TYLENOL) 500 MG tablet Take 1 tablet (500 mg total) by mouth every 6 (six) hours as needed for Pain.    amlodipine (NORVASC) 10 MG tablet Take 1 tablet (10 mg total) by mouth every morning.    atorvastatin (LIPITOR) 40 MG tablet Take 1 tablet (40 mg total) by mouth once daily.    calcium carbonate (OS-FERNANDA) 500 mg calcium (1,250 mg) chewable tablet Take 1 tablet (500  mg total) by mouth 3 (three) times daily with meals.    carvedilol (COREG) 25 MG tablet Take 1 tablet (25 mg total) by mouth 2 (two) times daily with meals.    chlorproMAZINE (THORAZINE) 25 MG tablet Take 1 tablet (25 mg total) by mouth 3 (three) times daily as needed (Hiccups).    cinacalcet (SENSIPAR) 60 MG Tab Take 1 tablet (60 mg total) by mouth daily with breakfast.    hydrALAZINE (APRESOLINE) 50 MG tablet Take 1 tablet (50 mg total) by mouth every 8 (eight) hours as needed (systolic blood pressure (top number) > 180).    lisinopril (PRINIVIL,ZESTRIL) 40 MG tablet Take 1 tablet (40 mg total) by mouth every evening.    omeprazole (PRILOSEC) 40 MG capsule Take 1 capsule (40 mg total) by mouth every morning.    ondansetron (ZOFRAN) 8 MG tablet Take 1 tablet (8 mg total) by mouth every 8 (eight) hours as needed for Nausea.     Family History     Problem Relation (Age of Onset)    Alcohol abuse Maternal Grandmother    Diabetes Brother, Maternal Grandfather    Early death Mother    Heart disease Father    Hyperlipidemia Father    Hypertension Father    Kidney disease Father        Social History Main Topics    Smoking status: Former Smoker     Packs/day: 1.00     Years: 10.00    Smokeless tobacco: Never Used    Alcohol use No    Drug use: No    Sexual activity: Not on file     Review of Systems   Constitutional: Negative for activity change, appetite change, chills, diaphoresis, fatigue, fever and unexpected weight change.   HENT: Negative for congestion, rhinorrhea, sore throat, trouble swallowing and voice change.    Eyes: Negative for visual disturbance.   Respiratory: Negative for cough, choking, chest tightness, shortness of breath and wheezing.    Cardiovascular: Negative for chest pain, palpitations and leg swelling.   Gastrointestinal: Positive for nausea and vomiting. Negative for abdominal distention, abdominal pain, anal bleeding, blood in stool, constipation and diarrhea.        Hematemesis    Endocrine: Negative for cold intolerance, heat intolerance, polydipsia and polyuria.   Genitourinary: Negative for dysuria, flank pain, frequency, hematuria and urgency.   Musculoskeletal: Negative for arthralgias, back pain, joint swelling and myalgias.   Skin: Negative for color change and rash.   Neurological: Negative for dizziness, seizures, syncope, facial asymmetry, speech difficulty, weakness, light-headedness, numbness and headaches.   Hematological: Negative for adenopathy. Does not bruise/bleed easily.   Psychiatric/Behavioral: Negative for agitation, confusion, hallucinations and suicidal ideas.     Objective:     Vital Signs (Most Recent):  Temp: 98.2 °F (36.8 °C) (06/23/17 2223)  Pulse: 102 (06/24/17 0303)  Resp: 19 (06/23/17 2223)  BP: (!) 164/85 (06/24/17 0303)  SpO2: 100 % (06/24/17 0303) Vital Signs (24h Range):  Temp:  [98.2 °F (36.8 °C)] 98.2 °F (36.8 °C)  Pulse:  [] 102  Resp:  [19] 19  SpO2:  [99 %-100 %] 100 %  BP: (164-206)/() 164/85     Weight: 78.9 kg (174 lb)  Body mass index is 26.46 kg/m².    Physical Exam   Constitutional: He is oriented to person, place, and time. He appears well-developed and well-nourished. No distress.   HENT:   Head: Normocephalic and atraumatic.   Eyes: Pupils are equal, round, and reactive to light.   Neck: Neck supple. Carotid bruit is not present. No thyromegaly present.   Cardiovascular: Normal rate and regular rhythm.  Exam reveals no gallop.    No murmur heard.  Pulmonary/Chest: Effort normal and breath sounds normal. No respiratory distress. He has no wheezes.   Abdominal: Bowel sounds are normal. He exhibits no distension. There is no splenomegaly or hepatomegaly. There is no tenderness.   Musculoskeletal: Normal range of motion. He exhibits no edema.   Neurological: He is alert and oriented to person, place, and time.   Skin: Skin is warm and dry. No rash noted.   Psychiatric: He has a normal mood and affect. His behavior is normal.         Significant Labs: All pertinent labs within the past 24 hours have been reviewed.    Significant Imaging: I have reviewed all pertinent imaging results/findings within the past 24 hours.    Assessment/Plan:     * Hematemesis    Esophagitis determined by endoscopy  Possible melena    - Will check FOBT.  H/H stable.  Patient started on IV Protonix in ER.  Will continue.  - Per chart review, patient underwent an EGD and colonoscopy in April.  EGD showed LA Grade D reflux esophagitis, small hiatal hernia.  Repeat EGD was recommended in three months.  Colonoscopy showed one 3 mm polyp in the transverse colon and one 8mm polyp in the sigmoid colon.  Bother were resected and retrieved.  - Will make NPO and consult GI.        Anemia in chronic kidney disease    - H/H stable at 13.1/38.3.  Will monitor.          Chronic diastolic heart failure    - No signs of decompensation.  - Last echo in February 2017 showed EF 50% with diastolic dysfunction, severe tricuspid regurg, and PAP 85.56.  - Continue BB and ACEi.          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    - Continue amlodipine 10mg daily, Coreg 25mg BID, lisinopril 40mg qHS, and hydralazine 50mg TID PRN SBP >180.          ESRD on hemodialysis    - Will consult nephrology.  Patient goes to HD MWF.          Dyslipidemia    - Continue Lipitor 40mg daily.          Secondary hyperparathyroidism of renal origin    - Continue calcium carbonate 500mg TID WM, Sensipar 60mg daily.          Type 2 diabetes mellitus with chronic kidney disease on chronic dialysis    - NPO SSI.            VTE Risk Mitigation         Ordered     Medium Risk of VTE  Once      06/24/17 0407     Place EML hose  Until discontinued      06/24/17 0407     Place sequential compression device  Until discontinued      06/24/17 0407        Lou Belle PA-C  Department of Hospital Medicine   [facility]

## 2017-06-24 NOTE — NURSING TRANSFER
Nursing Transfer Note      Pt arrived from the er via stretcher accompanied by transporter.aaox4.resp even and nonlabored.abd soft with bs audible in all 4 quads.left BKA noted.prosthetic leg at bedside.fistula noted to lue with + thrill and bruit.protonix drip in progress at 20ml/hr.pt denies nausea or vomiting.safety precautions implemented.will monitor

## 2017-06-24 NOTE — HPI
"Vaughn Retana is a 52 y.o. male with h/o ESRD, esophagitis, L AKA, DM II, HTN, HLD, chronic diastolic HF, and ICH.  He presents with hematemesis.  Patient states that he began having nausea and vomiting a few days ago and now has some blood in his vomit.  He states that he has had this before.  Per ER note, patient states that he has been having black stools for "a while now."  but he denies having dark stools.  He states that he has had no further episodes of vomiting since receiving Zofran in the ER. He is stable in ER/obs, hgb is above his baseline with 13. His most recent EGD/colon in 3/2017. EGD showed LA Grade D reflux esophagitis, small hiatal hernia. Colonoscopy showed one 3 mm polyp in the transverse colon and one 8mm polyp in the sigmoid colon both resected. Abdominal xray on admission unremarkable. He is not on any blood thinners.   "

## 2017-06-24 NOTE — SUBJECTIVE & OBJECTIVE
Past Medical History:   Diagnosis Date    Amputation stump pain 9/10/2013    Aspiration pneumonia 7/27/2015    Asterixis 11/8/2016    C. difficile colitis 8/7/2015    Cholelithiasis without obstruction 8/25/2015    Chronic diastolic heart failure     2-23-17   1 - Low normal to mildly depressed left ventricular systolic function (EF 50-55%).    2 - Right ventricular enlargement with mildly depressed systolic function.    3 - Left ventricular diastolic dysfunction.    4 - Right atrial enlargement.    5 - Severe tricuspid regurgitation.    6 - Pulmonary hypertension. The estimated PA systolic pressure is 86 mmHg.    7 - Increased central venous pressure.    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    Metabolic acidosis, IAG, reduced excretion of inorganic acids     Myoclonic jerking 9/20/2016    Secondary hyperparathyroidism (of renal origin)     Secondary pulmonary hypertension 3/23/2017    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    TB lung, latent 08/25/2015    Negative Quantiferon Gold 3-23-17       Past Surgical History:   Procedure Laterality Date    COLONOSCOPY      COLONOSCOPY N/A 4/4/2017    Procedure: COLONOSCOPY;  Surgeon: Walker Stern MD;  Location: [facility] ([address] [place] [address]);  Service: Endoscopy;  Laterality: N/A;  PA Systolic Pressure 85.56. HD Patient MWF, K+ lab prior to procedure.     FOOT AMPUTATION THROUGH METATARSAL      left foot    LEG AMPUTATION THROUGH KNEE  12/18/2013     left BKA    R AVF  9/12/12       Review of patient's allergies indicates:   Allergen Reactions    Fosrenol [lanthanum] Nausea And Vomiting     Nausea and vomiting       No current facility-administered medications on file prior to encounter.      Current Outpatient Prescriptions on File Prior to Encounter   Medication Sig    acetaminophen (TYLENOL) 500 MG tablet Take 1 tablet (500 mg total) by mouth every 6 (six) hours as needed for Pain.    amlodipine (NORVASC) 10 MG tablet Take 1 tablet (10 mg total) by mouth every morning.    atorvastatin (LIPITOR) 40 MG tablet Take 1 tablet (40 mg total) by mouth once daily.    calcium carbonate (OS-FERNANDA) 500 mg calcium (1,250 mg) chewable tablet Take 1 tablet (500 mg total) by mouth 3 (three) times daily with meals.    carvedilol (COREG) 25 MG tablet Take 1 tablet (25 mg total) by mouth 2 (two) times daily with meals.    chlorproMAZINE (THORAZINE) 25 MG tablet Take 1 tablet (25 mg total) by mouth 3 (three) times daily as needed (Hiccups).    cinacalcet (SENSIPAR) 60 MG Tab Take 1 tablet (60 mg total) by mouth daily with breakfast.    hydrALAZINE (APRESOLINE) 50 MG tablet Take 1 tablet (50 mg total) by mouth every 8 (eight) hours as needed (systolic blood pressure (top number) > 180).    lisinopril (PRINIVIL,ZESTRIL) 40 MG tablet Take 1 tablet (40 mg total) by mouth every evening.    omeprazole (PRILOSEC) 40 MG capsule Take 1 capsule (40 mg total) by mouth every morning.    ondansetron (ZOFRAN) 8 MG tablet Take 1 tablet (8 mg total) by mouth every 8 (eight) hours as needed for Nausea.     Family History     Problem Relation (Age of Onset)    Alcohol abuse Maternal Grandmother    Diabetes Brother, Maternal Grandfather    Early death Mother    Heart disease Father    Hyperlipidemia Father    Hypertension Father    Kidney disease Father        Social History Main Topics    Smoking status: Former Smoker     Packs/day: 1.00     Years: 10.00    Smokeless tobacco:  Never Used    Alcohol use No    Drug use: No    Sexual activity: Not on file     Review of Systems   Constitutional: Negative for activity change, appetite change, chills, diaphoresis, fatigue, fever and unexpected weight change.   HENT: Negative for congestion, rhinorrhea, sore throat, trouble swallowing and voice change.    Eyes: Negative for visual disturbance.   Respiratory: Negative for cough, choking, chest tightness, shortness of breath and wheezing.    Cardiovascular: Negative for chest pain, palpitations and leg swelling.   Gastrointestinal: Positive for nausea and vomiting. Negative for abdominal distention, abdominal pain, anal bleeding, blood in stool, constipation and diarrhea.        Hematemesis   Endocrine: Negative for cold intolerance, heat intolerance, polydipsia and polyuria.   Genitourinary: Negative for dysuria, flank pain, frequency, hematuria and urgency.   Musculoskeletal: Negative for arthralgias, back pain, joint swelling and myalgias.   Skin: Negative for color change and rash.   Neurological: Negative for dizziness, seizures, syncope, facial asymmetry, speech difficulty, weakness, light-headedness, numbness and headaches.   Hematological: Negative for adenopathy. Does not bruise/bleed easily.   Psychiatric/Behavioral: Negative for agitation, confusion, hallucinations and suicidal ideas.     Objective:     Vital Signs (Most Recent):  Temp: 98.2 °F (36.8 °C) (06/23/17 2223)  Pulse: 102 (06/24/17 0303)  Resp: 19 (06/23/17 2223)  BP: (!) 164/85 (06/24/17 0303)  SpO2: 100 % (06/24/17 0303) Vital Signs (24h Range):  Temp:  [98.2 °F (36.8 °C)] 98.2 °F (36.8 °C)  Pulse:  [] 102  Resp:  [19] 19  SpO2:  [99 %-100 %] 100 %  BP: (164-206)/() 164/85     Weight: 78.9 kg (174 lb)  Body mass index is 26.46 kg/m².    Physical Exam   Constitutional: He is oriented to person, place, and time. He appears well-developed and well-nourished. No distress.   HENT:   Head: Normocephalic and  atraumatic.   Eyes: Pupils are equal, round, and reactive to light.   Neck: Neck supple. Carotid bruit is not present. No thyromegaly present.   Cardiovascular: Normal rate and regular rhythm.  Exam reveals no gallop.    No murmur heard.  Pulmonary/Chest: Effort normal and breath sounds normal. No respiratory distress. He has no wheezes.   Abdominal: Bowel sounds are normal. He exhibits no distension. There is no splenomegaly or hepatomegaly. There is no tenderness.   Musculoskeletal: Normal range of motion. He exhibits no edema.   Neurological: He is alert and oriented to person, place, and time.   Skin: Skin is warm and dry. No rash noted.   Psychiatric: He has a normal mood and affect. His behavior is normal.        Significant Labs: All pertinent labs within the past 24 hours have been reviewed.    Significant Imaging: I have reviewed all pertinent imaging results/findings within the past 24 hours.

## 2017-06-24 NOTE — PROGRESS NOTES
Pt discharged from unit.  Pt aaox3, vss, no s/s of distress noted.  Pt denies pain.  Pt tolerated lunch without nasea / vomiting.  Discharge instructions given to pt and he verbalized understanding.  Home meds and f/u appts reviewed as well.  IV removed from left hand w/ catheter intact, no redness or swelling noted to area.  Pt refused w/c and ambulated off unit to meet ride.

## 2017-06-24 NOTE — DISCHARGE SUMMARY
"[facility] Hospital Medicine  Discharge Summary      Patient Name: Vaughn Retana  MRN: [id number]  Admission Date: 6/24/2017  Hospital Length of Stay: 0 days  Discharge Date and Time:  06/24/2017 1:44 PM  Attending Physician: Trev Enrique MD   Discharging Provider: Phoenix Carmichael PA-C  Primary Care Provider: Concepcion Landeros MD  [facility] Medicine Team: [facility] HOSP MED E Phoenix Carmichael PA-C    HPI:   Patient is a 52 year old gentleman with a h/o ESRD, esophagitis, L AKA, DM II, HTN, HLD, chronic diastolic HF, and ICH.  He presents with hematemesis.  Patient states that he began having nausea and vomiting a few days ago and now has some blood in his vomit.  He states that he has had this before.  Per ER note, patient states that he has been having black stools for "a while now."  However, he now denies this.  He states that he has had no further episodes of vomiting since receiving Zofran in the ER.  He denies chest pain, SOB, dizziness, palpitations, fever/chills, headache.    * No surgery found *      Indwelling Lines/Drains at time of discharge:   Lines/Drains/Airways     Drain                 Hemodialysis AV Graft 01/22/11 1828 Right upper arm 2344 days              Hospital Course:   Patient placed into observation for evaluation of possible hematemesis in setting of GERD with esophagitis. Patient recently with increased episodes of N/V that patient described as bloody on admit. However, when questioned further, patient admits to eating large amount of orange/red colored chips just prior to emesis. Additionally his blood levels remained at his baseline and he was hemodynamically stable throughout his hospitalization. The patient has not been compliant with his Protonix at home. GI believes that if patient did in fact have blood in his emesis it was likely just a trace amount and secondary to his untreated esophagitis. His is discharged home in good condition and tolerating a diet. I have " re-sent scrips for protonix BID and for zofran. He is to keep his outpatient EGD follow up.      Consults:   Consults         Status Ordering Provider     Inpatient consult to Gastroenterology  Once     Provider:  (Not yet assigned)    Completed AMEE MOSS     Inpatient consult to Nephrology  Once     Provider:  (Not yet assigned)    AMEE [place]          Significant Diagnostic Studies: Labs:   CMP   Recent Labs  Lab 06/24/17  0125      K 3.7   CL 94*   CO2 33*   *   BUN 26*   CREATININE 5.9*   CALCIUM 10.7*   PROT 9.4*   ALBUMIN 3.5   BILITOT 0.6   ALKPHOS 241*   AST 28   ALT 26   ANIONGAP 17*   ESTGFRAFRICA 11.6*   EGFRNONAA 10.0*    and CBC   Recent Labs  Lab 06/24/17  0125 06/24/17  1218   WBC 6.87 6.15   HGB 13.1* 11.7*   HCT 38.3* 34.2*   * 152       Pending Diagnostic Studies:     None        Final Active Diagnoses:    Diagnosis Date Noted POA    Chronic diastolic heart failure [I50.32]  Yes     Chronic    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 [I13.2, N18.6, I50.9] 08/01/2015 Yes     Chronic    Dyslipidemia [E78.5] 02/07/2013 Yes     Chronic    ESRD on hemodialysis [N18.6, Z99.2] 02/07/2013 Not Applicable     Chronic    Anemia in chronic kidney disease [N18.9, D63.1] 09/17/2012 Yes     Chronic    Secondary hyperparathyroidism of renal origin [N25.81] 09/17/2012 Yes     Chronic    Type 2 diabetes mellitus with chronic kidney disease on chronic dialysis [E11.22, N18.6, Z99.2] 07/27/2012 Not Applicable     Chronic      Problems Resolved During this Admission:    Diagnosis Date Noted Date Resolved POA    PRINCIPAL PROBLEM:  Hematemesis [K92.0] 06/24/2017 06/24/2017 Yes      * Hematemesis-resolved as of 6/24/2017    Esophagitis determined by endoscopy  Possible melena  - H/H stable.  Not true hematemesis, perhaps trace in setting of untreated esophagitis. Pt admits to eating orange/red colored chips prior to emesis  - Per chart review, patient  underwent an EGD and colonoscopy in April.  EGD showed LA Grade D reflux esophagitis, small hiatal hernia.  Repeat EGD was recommended in three months.  Colonoscopy showed one 3 mm polyp in the transverse colon and one 8mm polyp in the sigmoid colon.  Bother were resected and retrieved.  - GI consulted, patient to be on PPI BID, and to follow up with EGD outpatient.         Chronic diastolic heart failure    - No signs of decompensation.  - Last echo in February 2017 showed EF 50% with diastolic dysfunction, severe tricuspid regurg, and PAP 85.56.  - Continue BB and ACEi.        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    - Continue amlodipine 10mg daily, Coreg 25mg BID, lisinopril 40mg qHS, and hydralazine 50mg TID PRN SBP >180.        ESRD on hemodialysis    - Will consult nephrology.  Patient goes to HD MWF.        Dyslipidemia    - Continue Lipitor 40mg daily.        Secondary hyperparathyroidism of renal origin    - Continue calcium carbonate 500mg TID WM, Sensipar 60mg daily.        Anemia in chronic kidney disease    - H/H stable at 13.1/38.3. > 11.7  - baseline Hgb 10-12        Type 2 diabetes mellitus with chronic kidney disease on chronic dialysis    - SSI            Discharged Condition: good    Disposition: Home or Self Care    Follow Up:  Follow-up Information     Concepcion Landeros MD In 2 weeks.    Specialties:  Psychiatry, Internal Medicine  Contact information:  [facility] [address] [phone]             Please follow up.    Why:  Please keep your outpatient EGD follow up appointment.                Patient Instructions:     Diet renal     Activity as tolerated     Call MD for:  temperature >100.4     Call MD for:  persistent nausea and vomiting or diarrhea     Call MD for:  persistent dizziness, light-headedness, or visual disturbances       Medications:  Reconciled Home Medications:   Current Discharge Medication List      CONTINUE these medications which have  CHANGED    Details   omeprazole (PRILOSEC) 40 MG capsule Take 1 capsule (40 mg total) by mouth 2 (two) times daily before meals.  Qty: 90 capsule, Refills: 3      ondansetron (ZOFRAN) 8 MG tablet Take 1 tablet (8 mg total) by mouth every 8 (eight) hours as needed for Nausea.  Qty: 90 tablet, Refills: 4    Associated Diagnoses: Hiccups         CONTINUE these medications which have NOT CHANGED    Details   acetaminophen (TYLENOL) 500 MG tablet Take 1 tablet (500 mg total) by mouth every 6 (six) hours as needed for Pain.  Refills: 0      amlodipine (NORVASC) 10 MG tablet Take 1 tablet (10 mg total) by mouth every morning.  Qty: 90 tablet, Refills: 4    Associated Diagnoses: Malignant hypertension with heart failure and end-stage renal dis      atorvastatin (LIPITOR) 40 MG tablet Take 1 tablet (40 mg total) by mouth once daily.  Qty: 90 tablet, Refills: 4    Associated Diagnoses: Peripheral vascular disease in diabetes mellitus      calcium carbonate (OS-FERNANDA) 500 mg calcium (1,250 mg) chewable tablet Take 1 tablet (500 mg total) by mouth 3 (three) times daily with meals.    Associated Diagnoses: ESRD on hemodialysis      carvedilol (COREG) 25 MG tablet Take 1 tablet (25 mg total) by mouth 2 (two) times daily with meals.  Qty: 180 tablet, Refills: 4    Associated Diagnoses: Malignant hypertension with heart failure and end-stage renal dis      chlorproMAZINE (THORAZINE) 25 MG tablet Take 1 tablet (25 mg total) by mouth 3 (three) times daily as needed (Hiccups).  Qty: 90 tablet, Refills: 11    Associated Diagnoses: Hiccups      cinacalcet (SENSIPAR) 60 MG Tab Take 1 tablet (60 mg total) by mouth daily with breakfast.  Qty: 30 tablet, Refills: 3      hydrALAZINE (APRESOLINE) 50 MG tablet Take 1 tablet (50 mg total) by mouth every 8 (eight) hours as needed (systolic blood pressure (top number) > 180).  Qty: 90 tablet, Refills: 4    Associated Diagnoses: Malignant hypertension with heart failure and end-stage renal dis       lisinopril (PRINIVIL,ZESTRIL) 40 MG tablet Take 1 tablet (40 mg total) by mouth every evening.  Qty: 90 tablet, Refills: 4    Associated Diagnoses: Malignant hypertension with heart failure and end-stage renal dis           Time spent on the discharge of patient: 35 minutes    Phoenix Carmichael PA-C  Department of Hospital Medicine  [facility]

## 2017-06-24 NOTE — ASSESSMENT & PLAN NOTE
Esophagitis determined by endoscopy  Possible melena  - H/H stable.  Not true hematemesis, perhaps trace in setting of untreated esophagitis. Pt admits to eating orange/red colored chips prior to emesis  - Per chart review, patient underwent an EGD and colonoscopy in April.  EGD showed LA Grade D reflux esophagitis, small hiatal hernia.  Repeat EGD was recommended in three months.  Colonoscopy showed one 3 mm polyp in the transverse colon and one 8mm polyp in the sigmoid colon.  Bother were resected and retrieved.  - GI consulted, patient to be on PPI BID, and to follow up with EGD outpatient.

## 2017-06-24 NOTE — ED PROVIDER NOTES
Encounter Date: 6/23/2017    SCRIBE #1 NOTE: I, Sharon Jenkins, am scribing for, and in the presence of,  Dr. Gerardo . I have scribed the following portions of the note - the Resident attestation.       History     Chief Complaint   Patient presents with    Abdominal Pain     low abdominal pain for 3 days ago    Hematemesis     last time a hour ago after every time he eats     HPI   Mr. Retana is 53 YO male h/o ESRD (MWF HD), L AKA, DM, HTN presenting with 2 days nausea, vomiting, hemetemesis. Pt says he has never vomited blood before, denies h/o ulcer or varices. Had recent EGD with esophagitis. He is having epigastric abdominal pain which started after retching. He says he has been having black stools for 'a while now.' Denies fevers, chills. He received normal HD today.     Review of patient's allergies indicates:   Allergen Reactions    Fosrenol [lanthanum] Nausea And Vomiting     Nausea and vomiting     Past Medical History:   Diagnosis Date    Amputation stump pain 9/10/2013    Aspiration pneumonia 7/27/2015    Asterixis 11/8/2016    C. difficile colitis 8/7/2015    Cholelithiasis without obstruction 8/25/2015    Chronic diastolic heart failure     2-23-17   1 - Low normal to mildly depressed left ventricular systolic function (EF 50-55%).    2 - Right ventricular enlargement with mildly depressed systolic function.    3 - Left ventricular diastolic dysfunction.    4 - Right atrial enlargement.    5 - Severe tricuspid regurgitation.    6 - Pulmonary hypertension. The estimated PA systolic pressure is 86 mmHg.    7 - Increased central venous pressure.    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 8/1/2015    Metabolic acidosis, IAG, reduced excretion of inorganic acids     Myoclonic jerking 9/20/2016    Secondary hyperparathyroidism (of renal origin)     Secondary pulmonary hypertension 3/23/2017    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    TB lung, latent 08/25/2015    Negative Quantiferon Gold 3-23-17     Past Surgical History:   Procedure Laterality Date    COLONOSCOPY      COLONOSCOPY N/A 4/4/2017    Procedure: COLONOSCOPY;  Surgeon: Walker Stern MD;  Location: [facility] ([address] [place] [address]);  Service: Endoscopy;  Laterality: N/A;  PA Systolic Pressure 85.56. HD Patient MWF, K+ lab prior to procedure.     FOOT AMPUTATION THROUGH METATARSAL      left foot    LEG AMPUTATION THROUGH KNEE  12/18/2013    left BKA    R AVF  9/12/12     Family History   Problem Relation Age of Onset    Early death Mother     Kidney disease Father     Hypertension Father     Heart disease Father     Hyperlipidemia Father     Diabetes Brother     Alcohol abuse Maternal Grandmother     Diabetes Maternal Grandfather     Melanoma Neg Hx      Social History   Substance Use Topics    Smoking status: Former Smoker     Packs/day: 1.00     Years: 10.00    Smokeless tobacco: Never Used    Alcohol use No     Review of Systems   Constitutional: Negative for fever.   HENT: Negative for sore throat.    Respiratory: Negative for shortness of breath.    Cardiovascular: Negative for chest pain.   Gastrointestinal: Positive for abdominal pain, blood in stool, nausea and vomiting.   Genitourinary: Negative for dysuria.   Musculoskeletal: Negative for back pain.   Skin: Negative for rash.   Neurological: Negative for weakness.   Hematological: Does not bruise/bleed easily.   All other systems reviewed and are  negative.      Physical Exam     Initial Vitals [06/23/17 2223]   BP Pulse Resp Temp SpO2   (!) 197/101 95 19 98.2 °F (36.8 °C) 99 %      MAP       133         Physical Exam    Nursing note and vitals reviewed.  Constitutional: He appears well-developed and well-nourished. He appears distressed.   retching   HENT:   Head: Normocephalic and atraumatic.   Eyes: EOM are normal. Pupils are equal, round, and reactive to light.   Neck: Normal range of motion. Neck supple.   Cardiovascular: Normal rate and regular rhythm. Exam reveals no gallop and no friction rub.    No murmur heard.  Pulmonary/Chest: Breath sounds normal. He has no wheezes. He has no rhonchi. He has no rales.   Abdominal: Soft. There is tenderness (epigastric). There is no rebound and no guarding.   Musculoskeletal: Normal range of motion.   Fistula R arm with palpable thrill   Neurological: He is alert and oriented to person, place, and time. He has normal strength. No cranial nerve deficit or sensory deficit.   Skin: Skin is warm and dry.         ED Course   Procedures  Labs Reviewed   CBC W/ AUTO DIFFERENTIAL - Abnormal; Notable for the following:        Result Value    RBC 4.03 (*)     Hemoglobin 13.1 (*)     Hematocrit 38.3 (*)     MCH 32.5 (*)     Platelets 147 (*)     All other components within normal limits   COMPREHENSIVE METABOLIC PANEL - Abnormal; Notable for the following:     Chloride 94 (*)     CO2 33 (*)     Glucose 115 (*)     BUN, Bld 26 (*)     Creatinine 5.9 (*)     Calcium 10.7 (*)     Total Protein 9.4 (*)     Alkaline Phosphatase 241 (*)     Anion Gap 17 (*)     eGFR if  11.6 (*)     eGFR if non  10.0 (*)     All other components within normal limits   LIPASE   APTT   PROTIME-INR   LACTIC ACID, PLASMA   HEMOGLOBIN A1C   URINALYSIS   OCCULT BLOOD X 1, STOOL   HEMOGLOBIN A1C   TYPE & SCREEN   POCT GLUCOSE MONITORING CONTINUOUS                   APC / Resident Notes:   Pt presenting with 2 days nausea,  vomiting, hematemesis, abdominal pain. Ddx includes gastric ulcer, perforated ulcer, duodenal ulcer, renzo juan, gastritis, pancreatitis, biliary pathology, SBO. No history c/w variceal bleeding. Giving zofran and protonix. Will check labs, likely admit to medicine for GI consult, scope.    Ramila Rowley [place], HO4  [facility]  1:42 AM         Scribe Attestation:   Scribe #1: I performed the above scribed service and the documentation accurately describes the services I performed. I attest to the accuracy of the note.    Attending Attestation:   Physician Attestation Statement for Resident:  As the supervising MD   Physician Attestation Statement: I have personally seen and examined this patient.   I agree with the above history. -: Patient presents with ESRD with hematemesis. Considering multiple episodes of hematemesis, pt admitted for serial CBC and likely endoscopy of Gi. No blood transfusion at this time.    As the supervising MD I agree with the above PE.    As the supervising MD I agree with the above treatment, course, plan, and disposition.          Physician Attestation for Scribe:  Physician Attestation Statement for Scribe #1: I, Dr. Gerardo , reviewed documentation, as scribed by Sharon Jenkins  in my presence, and it is both accurate and complete.                 ED Course     Clinical Impression:   The primary encounter diagnosis was Non-intractable vomiting with nausea, unspecified vomiting type. A diagnosis of Hematemesis was also pertinent to this visit.                           Ramila Wallace MD  Resident  06/24/17 [id number]

## 2017-06-24 NOTE — ED NOTES
"Patient identifiers verified and correct for Vaughn Retana.    LOC: The patient is awake, alert and aware of environment with an appropriate affect, the patient is oriented x 3 and speaking appropriately.  APPEARANCE: Patient resting comfortably and in no acute distress, patient is clean and well groomed, patient's clothing is properly fastened.  SKIN: The skin is warm and dry, color consistent with ethnicity, patient has normal skin turgor and moist mucus membranes, skin intact, no breakdown or bruising noted.  MUSCULOSKELETAL: Patient moving all extremities spontaneously with exception of below the knee amputation to left lower extremity, no obvious swelling or deformities noted. Pt has an AV fistula to right upper arm. Limb alert bracelet placed on pt's right arm  RESPIRATORY: Airway is open and patent, respirations are spontaneous, patient has a normal effort and rate, no accessory muscle use noted, bilateral breath sounds slightly diminishing.  CARDIAC: Patient has a tachycardiac rate and regular rhythm, no periphreal edema noted, capillary refill < 3 seconds.  ABDOMEN: Soft and tender to palpation to RLQ and LLQ, no distention noted, normoactive bowel sounds present in all four quadrants. Pt reports hematemesis that is "brown" in color. Pt actively vomiting in room  NEUROLOGIC: Pupils equal bilaterally, eyes open spontaneously, behavior appropriate to situation, follows commands, facial expression symmetrical, bilateral hand grasp equal and even, purposeful motor response noted, normal sensation in all extremities when touched with a finger.  "

## 2017-06-24 NOTE — PLAN OF CARE
Problem: Patient Care Overview  Goal: Plan of Care Review  Outcome: Ongoing (interventions implemented as appropriate)  Pt aaox3, vss, no s/s of distress noted.  Pt denies pain.  Accuchecks ac/hs.  Safety precautions maintained, pt remains free of falls.  Pt denies n/v.  No active bleeding noted.  Call light within reach.

## 2017-06-24 NOTE — ASSESSMENT & PLAN NOTE
Esophagitis determined by endoscopy  Possible melena    - Will check FOBT.  H/H stable.  Patient started on IV Protonix in ER.  Will continue.  - Per chart review, patient underwent an EGD and colonoscopy in April.  EGD showed LA Grade D reflux esophagitis, small hiatal hernia.  Repeat EGD was recommended in three months.  Colonoscopy showed one 3 mm polyp in the transverse colon and one 8mm polyp in the sigmoid colon.  Bother were resected and retrieved.  - Will make NPO and consult GI.

## 2017-06-24 NOTE — ASSESSMENT & PLAN NOTE
Trace blood with emesis. Not true hematemesis. Likely due to esophagitis. Does not take PPI at home daily, likely not healing.     Plan:  Protonix IV BID and daily on discharge indefinitely.  Intravascular resuscitation/support with IVFs   Serial H/H's and pRBCs transfusion as indicated  Discontinue all NSAIDs and Heparin products  Please correct any coagulopathy with platelets and FFP to a goal of platelets >50K and INR <2.0  Maintain IV access with 2 large bore IVs  Resume diet  Serial H/H, if no change, would consider outpatient EGD  Please notify GI team if there is significant change in patient's clinical status

## 2017-06-24 NOTE — HOSPITAL COURSE
Patient placed into observation for evaluation of possible hematemesis in setting of GERD with esophagitis. Patient recently with increased episodes of N/V that patient described as bloody on admit. However, when questioned further, patient admits to eating large amount of orange/red colored chips just prior to emesis. Additionally his blood levels remained at his baseline and he was hemodynamically stable throughout his hospitalization. The patient has not been compliant with his Protonix at home. GI believes that if patient did in fact have blood in his emesis it was likely just a trace amount and secondary to his untreated esophagitis. His is discharged home in good condition and tolerating a diet. I have re-sent scrips for protonix BID and for zofran. He is to keep his outpatient EGD follow up.

## 2017-06-24 NOTE — HPI
"Patient is a 52 year old gentleman with a h/o ESRD, esophagitis, L AKA, DM II, HTN, HLD, chronic diastolic HF, and ICH.  He presents with hematemesis.  Patient states that he began having nausea and vomiting a few days ago and now has some blood in his vomit.  He states that he has had this before.  Per ER note, patient states that he has been having black stools for "a while now."  However, he now denies this.  He states that he has had no further episodes of vomiting since receiving Zofran in the ER.  He denies chest pain, SOB, dizziness, palpitations, fever/chills, headache.  "

## 2017-06-28 ENCOUNTER — HOSPITAL ENCOUNTER (EMERGENCY)
Facility: HOSPITAL | Age: 52
Discharge: HOME | End: 2017-06-29
Attending: EMERGENCY MEDICINE
Payer: MEDICARE

## 2017-06-28 VITALS
HEIGHT: 66 IN | HEART RATE: 94 BPM | TEMPERATURE: 99 F | OXYGEN SATURATION: 100 % | BODY MASS INDEX: 27.32 KG/M2 | DIASTOLIC BLOOD PRESSURE: 95 MMHG | RESPIRATION RATE: 18 BRPM | SYSTOLIC BLOOD PRESSURE: 181 MMHG | WEIGHT: 170 LBS

## 2017-06-28 DIAGNOSIS — T50.901A INGESTION OF UNKNOWN MEDICATION, ACCIDENTAL OR UNINTENTIONAL, INITIAL ENCOUNTER: Primary | ICD-10-CM

## 2017-06-28 PROCEDURE — 99283 EMERGENCY DEPT VISIT LOW MDM: CPT | Mod: ,,, | Performed by: PHYSICIAN ASSISTANT

## 2017-06-28 PROCEDURE — 99283 EMERGENCY DEPT VISIT LOW MDM: CPT

## 2017-06-28 PROCEDURE — 25000003 PHARM REV CODE 250: Performed by: PHYSICIAN ASSISTANT

## 2017-06-28 RX ORDER — ONDANSETRON 8 MG/1
8 TABLET, ORALLY DISINTEGRATING ORAL
Status: COMPLETED | OUTPATIENT
Start: 2017-06-28 | End: 2017-06-28

## 2017-06-28 RX ORDER — ONDANSETRON 8 MG/1
8 TABLET, ORALLY DISINTEGRATING ORAL EVERY 8 HOURS PRN
Qty: 10 TABLET | Refills: 0 | Status: ON HOLD | OUTPATIENT
Start: 2017-06-28 | End: 2017-07-28 | Stop reason: HOSPADM

## 2017-06-28 RX ADMIN — ONDANSETRON 8 MG: 8 TABLET, ORALLY DISINTEGRATING ORAL at 11:06

## 2017-06-29 ENCOUNTER — HOME CARE VISIT (OUTPATIENT)
Dept: NEUROLOGY | Facility: HOSPITAL | Age: 52
End: 2017-06-29

## 2017-06-29 NOTE — ED NOTES
Two patient identifiers have been checked and are correct.      Appearance: Pt awake, alert & oriented to person, place & time. Pt in no acute distress at present time. Pt is clean and well groomed with clothes appropriately fastened.   Skin: Skin warm, dry & intact. Color consistent with ethnicity. Mucous membranes moist. No breakdown or brusing noted.   Musculoskeletal: Patient moving all extremities well, no obvious swelling or deformities noted. Denies pain.  Respiratory: Respirations spontaneous, even, and non-labored. Visible chest rise noted. Airway is open and patent. No accessory muscle use noted. Denies SOB.   Neurologic: Sensation is intact. Speech is clear and appropriate. Eyes open spontaneously, behavior appropriate to situation, follows commands, facial expression symmetrical, bilateral hand grasp equal and even, purposeful motor response noted. Denies HA.  Cardiac: All peripheral pulses present. No Bilateral lower extremity edema. Cap refill is <3 seconds. Denies CP.  Abdomen: Abdomen soft, non-tender to palpation. Reports NV. Pt has emesis bag in hand trying to burp and gag to throw up unknown med.   : Pt reports no dysuria or hematuria.

## 2017-06-29 NOTE — ED TRIAGE NOTES
"Vaughn Retana, a 52 y.o. male presents to the ED pt states he took wrong pill yesterday and states he doesn't know what pill he took yesterday. Pt denies any pain. Pt sitting with emesis bag gagging stating he needs to vomit it back out.      Chief Complaint   Patient presents with    Accidental Medication Ingestion     Pt states "I was trying to take my pressure pill and I think I took the wrong pill." Pt currently trying to make self vomit. Pt states "I feel a little woozy."     Review of patient's allergies indicates:   Allergen Reactions    Fosrenol [lanthanum] Nausea And Vomiting     Nausea and vomiting     Past Medical History:   Diagnosis Date    Amputation stump pain 9/10/2013    Aspiration pneumonia 7/27/2015    Asterixis 11/8/2016    C. difficile colitis 8/7/2015    Cholelithiasis without obstruction 8/25/2015    Chronic diastolic heart failure     2-23-17   1 - Low normal to mildly depressed left ventricular systolic function (EF 50-55%).    2 - Right ventricular enlargement with mildly depressed systolic function.    3 - Left ventricular diastolic dysfunction.    4 - Right atrial enlargement.    5 - Severe tricuspid regurgitation.    6 - Pulmonary hypertension. The estimated PA systolic pressure is 86 mmHg.    7 - Increased central venous pressure.    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

## 2017-06-29 NOTE — ED PROVIDER NOTES
"Encounter Date: 6/28/2017    SCRIBE #1 NOTE: I, Damon Gross, am scribing for, and in the presence of,  Froylan Cobos MD. I have scribed the following portions of the note - the APC attestation.       History     Chief Complaint   Patient presents with    Accidental Medication Ingestion     Pt states "I was trying to take my pressure pill and I think I took the wrong pill." Pt currently trying to make self vomit. Pt states "I feel a little woozy."     52-year-old male presents to the ER for evaluation accidental medication ingestion.  Patient reports taking one tablet of one of his medications but unsure which medication it was.  This occurred yesterday.  Patient realized this today and has been trying to induce vomiting although he is not symptomatic.  He denies any chest pain, fever, chills, nausea, vomiting.          Review of patient's allergies indicates:   Allergen Reactions    Fosrenol [lanthanum] Nausea And Vomiting     Nausea and vomiting     Past Medical History:   Diagnosis Date    Amputation stump pain 9/10/2013    Aspiration pneumonia 7/27/2015    Asterixis 11/8/2016    C. difficile colitis 8/7/2015    Cholelithiasis without obstruction 8/25/2015    Chronic diastolic heart failure     2-23-17   1 - Low normal to mildly depressed left ventricular systolic function (EF 50-55%).    2 - Right ventricular enlargement with mildly depressed systolic function.    3 - Left ventricular diastolic dysfunction.    4 - Right atrial enlargement.    5 - Severe tricuspid regurgitation.    6 - Pulmonary hypertension. The estimated PA systolic pressure is 86 mmHg.    7 - Increased central venous pressure.    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 8/1/2015    Metabolic acidosis, IAG, reduced excretion of inorganic acids     Myoclonic jerking 9/20/2016    Secondary hyperparathyroidism (of renal origin)     Secondary pulmonary hypertension 3/23/2017    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    TB lung, latent 08/25/2015    Negative Quantiferon Gold 3-23-17     Past Surgical History:   Procedure Laterality Date    COLONOSCOPY      COLONOSCOPY N/A 4/4/2017    Procedure: COLONOSCOPY;  Surgeon: Walker Stern MD;  Location: [facility] ([address] [place] [address]);  Service: Endoscopy;  Laterality: N/A;  PA Systolic Pressure 85.56. HD Patient MWF, K+ lab prior to procedure.     FOOT AMPUTATION THROUGH METATARSAL      left foot    LEG AMPUTATION THROUGH KNEE  12/18/2013    left BKA    R AVF  9/12/12     Family History   Problem Relation Age of Onset    Early death Mother     Kidney disease Father     Hypertension Father     Heart disease Father     Hyperlipidemia Father     Diabetes Brother     Alcohol abuse Maternal Grandmother     Diabetes Maternal Grandfather     Melanoma Neg Hx      Social History   Substance Use Topics    Smoking status: Former Smoker     Packs/day: 1.00     Years: 10.00    Smokeless tobacco: Never Used    Alcohol use No     Review of Systems   Constitutional: Negative for fever.   HENT: Negative for sore throat.    Respiratory: Negative for shortness of breath.    Cardiovascular: Negative for chest pain.   Gastrointestinal: Negative for nausea.   Genitourinary: Negative for dysuria.   Musculoskeletal: Negative for back pain.   Skin: Negative for rash.   Neurological: Negative for weakness.   Hematological: Does not bruise/bleed easily.       Physical Exam     Initial Vitals [06/28/17 2040]    BP Pulse Resp Temp SpO2   (!) 181/95 94 18 98.9 °F (37.2 °C) 100 %      MAP       123.67         Physical Exam    Constitutional: Vital signs are normal. He appears well-developed and well-nourished.   HENT:   Head: Normocephalic and atraumatic.   Eyes: Conjunctivae are normal.   Cardiovascular: Normal rate and regular rhythm.   Abdominal: Soft. Normal appearance, normal aorta and bowel sounds are normal.   Musculoskeletal: Normal range of motion.   Neurological: He is alert and oriented to person, place, and time.   Skin: Skin is warm and intact.   Psychiatric: He has a normal mood and affect. His speech is normal and behavior is normal. Cognition and memory are normal.         ED Course   Procedures  Labs Reviewed - No data to display          Medical Decision Making:   History:   Old Medical Records: I decided to obtain old medical records.  ED Management:  52-year-old male to the ER for evaluation of unknown ingestion of a medication.  The medication is one of the patient's prescribed medications but he is not sure which one he took.  He is asymptomatic.  The medication was ingested 24 hours ago.  The patient self-induced vomiting while in the intake room and reports feeling better.  His vital signs are stable.  There are no signs of an ALLERGIC reaction.  Patient will be discharged home            Scribe Attestation:   Scribe #1: I performed the above scribed service and the documentation accurately describes the services I performed. I attest to the accuracy of the note.    Attending Attestation:     Physician Attestation Statement for NP/PA:   I discussed this assessment and plan of this patient with the NP/PA, but I did not personally examine the patient. The face to face encounter was performed by the NP/PA.    Other NP/PA Attestation Additions:    History of Present Illness: Accidental medication ingestion         Physician Attestation for Scribe:  Physician Attestation Statement for Scribe #1: I,  Froylan Cobos MD, reviewed documentation, as scribed by Damon Gross in my presence, and it is both accurate and complete.                 ED Course     Clinical Impression:   The encounter diagnosis was Ingestion of unknown medication, accidental or unintentional, initial encounter.    Disposition:   Disposition: Discharged  Condition: Stable                        Wade Savage PA-C  06/29/17 0251       Froylan Cobos III, MD  07/21/17 2021

## 2017-07-10 ENCOUNTER — HOSPITAL ENCOUNTER (OUTPATIENT)
Facility: HOSPITAL | Age: 52
Discharge: HOME | End: 2017-07-12
Attending: EMERGENCY MEDICINE | Admitting: EMERGENCY MEDICINE
Payer: MEDICARE

## 2017-07-10 DIAGNOSIS — K20.90 ESOPHAGITIS DETERMINED BY ENDOSCOPY: Chronic | ICD-10-CM

## 2017-07-10 DIAGNOSIS — D63.1 ANEMIA IN CHRONIC KIDNEY DISEASE(285.21): Chronic | ICD-10-CM

## 2017-07-10 DIAGNOSIS — Z99.2 ESRD ON HEMODIALYSIS: Chronic | ICD-10-CM

## 2017-07-10 DIAGNOSIS — I15.0 RENOVASCULAR HYPERTENSION: ICD-10-CM

## 2017-07-10 DIAGNOSIS — Z99.2 DIALYSIS PATIENT: ICD-10-CM

## 2017-07-10 DIAGNOSIS — R11.2 INTRACTABLE VOMITING WITH NAUSEA: ICD-10-CM

## 2017-07-10 DIAGNOSIS — N18.6 ESRD ON HEMODIALYSIS: Chronic | ICD-10-CM

## 2017-07-10 DIAGNOSIS — R11.2 INTRACTABLE VOMITING WITH NAUSEA, UNSPECIFIED VOMITING TYPE: Primary | ICD-10-CM

## 2017-07-10 DIAGNOSIS — Z89.512 HISTORY OF LEFT BELOW KNEE AMPUTATION: Chronic | ICD-10-CM

## 2017-07-10 DIAGNOSIS — N18.9 ANEMIA IN CHRONIC KIDNEY DISEASE(285.21): Chronic | ICD-10-CM

## 2017-07-10 DIAGNOSIS — E78.5 DYSLIPIDEMIA: Chronic | ICD-10-CM

## 2017-07-10 LAB
ALBUMIN SERPL BCP-MCNC: 3.6 G/DL
ALP SERPL-CCNC: 226 U/L
ALT SERPL W/O P-5'-P-CCNC: 15 U/L
ANION GAP SERPL CALC-SCNC: 16 MMOL/L
AST SERPL-CCNC: 20 U/L
BASOPHILS # BLD AUTO: 0.02 K/UL
BASOPHILS NFR BLD: 0.2 %
BILIRUB SERPL-MCNC: 0.5 MG/DL
BUN SERPL-MCNC: 52 MG/DL
CALCIUM SERPL-MCNC: 11 MG/DL
CHLORIDE SERPL-SCNC: 88 MMOL/L
CO2 SERPL-SCNC: 36 MMOL/L
CREAT SERPL-MCNC: 11 MG/DL
DIFFERENTIAL METHOD: ABNORMAL
EOSINOPHIL # BLD AUTO: 0.3 K/UL
EOSINOPHIL NFR BLD: 3.7 %
ERYTHROCYTE [DISTWIDTH] IN BLOOD BY AUTOMATED COUNT: 13.1 %
EST. GFR  (AFRICAN AMERICAN): 5.4 ML/MIN/1.73 M^2
EST. GFR  (NON AFRICAN AMERICAN): 4.7 ML/MIN/1.73 M^2
GLUCOSE SERPL-MCNC: 143 MG/DL
HCT VFR BLD AUTO: 33.6 %
HGB BLD-MCNC: 11.6 G/DL
LIPASE SERPL-CCNC: 61 U/L
LYMPHOCYTES # BLD AUTO: 1.7 K/UL
LYMPHOCYTES NFR BLD: 20.4 %
MAGNESIUM SERPL-MCNC: 2.3 MG/DL
MCH RBC QN AUTO: 32 PG
MCHC RBC AUTO-ENTMCNC: 34.5 %
MCV RBC AUTO: 93 FL
MONOCYTES # BLD AUTO: 0.7 K/UL
MONOCYTES NFR BLD: 8.7 %
NEUTROPHILS # BLD AUTO: 5.6 K/UL
NEUTROPHILS NFR BLD: 66.9 %
PHOSPHATE SERPL-MCNC: 4.7 MG/DL
PLATELET # BLD AUTO: 229 K/UL
PMV BLD AUTO: 10.4 FL
POTASSIUM SERPL-SCNC: 3.6 MMOL/L
PROT SERPL-MCNC: 9.3 G/DL
RBC # BLD AUTO: 3.63 M/UL
SODIUM SERPL-SCNC: 140 MMOL/L
WBC # BLD AUTO: 8.37 K/UL

## 2017-07-10 PROCEDURE — C9113 INJ PANTOPRAZOLE SODIUM, VIA: HCPCS | Performed by: HOSPITALIST

## 2017-07-10 PROCEDURE — 83690 ASSAY OF LIPASE: CPT

## 2017-07-10 PROCEDURE — 99220 PR INITIAL OBSERVATION CARE,LEVL III: CPT | Mod: ,,, | Performed by: HOSPITALIST

## 2017-07-10 PROCEDURE — 85025 COMPLETE CBC W/AUTO DIFF WBC: CPT

## 2017-07-10 PROCEDURE — 84100 ASSAY OF PHOSPHORUS: CPT

## 2017-07-10 PROCEDURE — 99213 OFFICE O/P EST LOW 20 MIN: CPT | Mod: ,,, | Performed by: NURSE PRACTITIONER

## 2017-07-10 PROCEDURE — 93010 ELECTROCARDIOGRAM REPORT: CPT | Mod: ,,, | Performed by: INTERNAL MEDICINE

## 2017-07-10 PROCEDURE — 93005 ELECTROCARDIOGRAM TRACING: CPT

## 2017-07-10 PROCEDURE — 25500020 PHARM REV CODE 255: Performed by: EMERGENCY MEDICINE

## 2017-07-10 PROCEDURE — 63600175 PHARM REV CODE 636 W HCPCS: Performed by: HOSPITALIST

## 2017-07-10 PROCEDURE — 99285 EMERGENCY DEPT VISIT HI MDM: CPT | Mod: 25

## 2017-07-10 PROCEDURE — G0378 HOSPITAL OBSERVATION PER HR: HCPCS

## 2017-07-10 PROCEDURE — 63600175 PHARM REV CODE 636 W HCPCS: Performed by: STUDENT IN AN ORGANIZED HEALTH CARE EDUCATION/TRAINING PROGRAM

## 2017-07-10 PROCEDURE — 25000003 PHARM REV CODE 250: Performed by: NURSE PRACTITIONER

## 2017-07-10 PROCEDURE — 80053 COMPREHEN METABOLIC PANEL: CPT

## 2017-07-10 PROCEDURE — 99285 EMERGENCY DEPT VISIT HI MDM: CPT | Mod: GC,,, | Performed by: EMERGENCY MEDICINE

## 2017-07-10 PROCEDURE — 96376 TX/PRO/DX INJ SAME DRUG ADON: CPT

## 2017-07-10 PROCEDURE — 25000003 PHARM REV CODE 250: Performed by: HOSPITALIST

## 2017-07-10 PROCEDURE — 83735 ASSAY OF MAGNESIUM: CPT

## 2017-07-10 PROCEDURE — 63600175 PHARM REV CODE 636 W HCPCS: Performed by: EMERGENCY MEDICINE

## 2017-07-10 PROCEDURE — 96374 THER/PROPH/DIAG INJ IV PUSH: CPT

## 2017-07-10 RX ORDER — OXYCODONE HYDROCHLORIDE 5 MG/1
5 TABLET ORAL EVERY 6 HOURS PRN
Status: DISCONTINUED | OUTPATIENT
Start: 2017-07-10 | End: 2017-07-12 | Stop reason: HOSPADM

## 2017-07-10 RX ORDER — SODIUM CHLORIDE 0.9 % (FLUSH) 0.9 %
3 SYRINGE (ML) INJECTION EVERY 8 HOURS
Status: DISCONTINUED | OUTPATIENT
Start: 2017-07-10 | End: 2017-07-12 | Stop reason: HOSPADM

## 2017-07-10 RX ORDER — ATORVASTATIN CALCIUM 20 MG/1
40 TABLET, FILM COATED ORAL DAILY
Status: DISCONTINUED | OUTPATIENT
Start: 2017-07-10 | End: 2017-07-12 | Stop reason: HOSPADM

## 2017-07-10 RX ORDER — AMLODIPINE BESYLATE 10 MG/1
10 TABLET ORAL EVERY MORNING
Status: DISCONTINUED | OUTPATIENT
Start: 2017-07-10 | End: 2017-07-12 | Stop reason: HOSPADM

## 2017-07-10 RX ORDER — ONDANSETRON 2 MG/ML
4 INJECTION INTRAMUSCULAR; INTRAVENOUS
Status: COMPLETED | OUTPATIENT
Start: 2017-07-10 | End: 2017-07-10

## 2017-07-10 RX ORDER — ACETAMINOPHEN 325 MG/1
650 TABLET ORAL EVERY 8 HOURS PRN
Status: DISCONTINUED | OUTPATIENT
Start: 2017-07-10 | End: 2017-07-12 | Stop reason: HOSPADM

## 2017-07-10 RX ORDER — CARVEDILOL 12.5 MG/1
25 TABLET ORAL 2 TIMES DAILY WITH MEALS
Status: DISCONTINUED | OUTPATIENT
Start: 2017-07-10 | End: 2017-07-12 | Stop reason: HOSPADM

## 2017-07-10 RX ORDER — HEPARIN SODIUM 5000 [USP'U]/ML
5000 INJECTION, SOLUTION INTRAVENOUS; SUBCUTANEOUS EVERY 8 HOURS
Status: DISCONTINUED | OUTPATIENT
Start: 2017-07-10 | End: 2017-07-12 | Stop reason: HOSPADM

## 2017-07-10 RX ORDER — LISINOPRIL 20 MG/1
40 TABLET ORAL NIGHTLY
Status: DISCONTINUED | OUTPATIENT
Start: 2017-07-10 | End: 2017-07-12 | Stop reason: HOSPADM

## 2017-07-10 RX ORDER — PANTOPRAZOLE SODIUM 40 MG/1
40 TABLET, DELAYED RELEASE ORAL DAILY
Status: DISCONTINUED | OUTPATIENT
Start: 2017-07-10 | End: 2017-07-10

## 2017-07-10 RX ORDER — ONDANSETRON 2 MG/ML
4 INJECTION INTRAMUSCULAR; INTRAVENOUS EVERY 8 HOURS PRN
Status: DISCONTINUED | OUTPATIENT
Start: 2017-07-10 | End: 2017-07-12

## 2017-07-10 RX ORDER — PANTOPRAZOLE SODIUM 40 MG/10ML
40 INJECTION, POWDER, LYOPHILIZED, FOR SOLUTION INTRAVENOUS 2 TIMES DAILY
Status: DISCONTINUED | OUTPATIENT
Start: 2017-07-10 | End: 2017-07-12 | Stop reason: HOSPADM

## 2017-07-10 RX ORDER — SODIUM CHLORIDE 9 MG/ML
INJECTION, SOLUTION INTRAVENOUS ONCE
Status: COMPLETED | OUTPATIENT
Start: 2017-07-10 | End: 2017-07-10

## 2017-07-10 RX ORDER — CINACALCET 60 MG/1
60 TABLET, FILM COATED ORAL
Status: DISCONTINUED | OUTPATIENT
Start: 2017-07-11 | End: 2017-07-12 | Stop reason: HOSPADM

## 2017-07-10 RX ADMIN — AMLODIPINE BESYLATE 10 MG: 10 TABLET ORAL at 12:07

## 2017-07-10 RX ADMIN — OXYCODONE HYDROCHLORIDE 5 MG: 5 TABLET ORAL at 12:07

## 2017-07-10 RX ADMIN — PANTOPRAZOLE SODIUM 40 MG: 40 INJECTION, POWDER, FOR SOLUTION INTRAVENOUS at 05:07

## 2017-07-10 RX ADMIN — ONDANSETRON 4 MG: 2 INJECTION INTRAMUSCULAR; INTRAVENOUS at 01:07

## 2017-07-10 RX ADMIN — HEPARIN SODIUM 5000 UNITS: 5000 INJECTION, SOLUTION INTRAVENOUS; SUBCUTANEOUS at 03:07

## 2017-07-10 RX ADMIN — CARVEDILOL 25 MG: 12.5 TABLET, FILM COATED ORAL at 05:07

## 2017-07-10 RX ADMIN — ONDANSETRON 4 MG: 2 INJECTION INTRAMUSCULAR; INTRAVENOUS at 08:07

## 2017-07-10 RX ADMIN — IOHEXOL 75 ML: 350 INJECTION, SOLUTION INTRAVENOUS at 09:07

## 2017-07-10 RX ADMIN — SODIUM CHLORIDE: 0.9 INJECTION, SOLUTION INTRAVENOUS at 05:07

## 2017-07-10 RX ADMIN — SODIUM CHLORIDE, PRESERVATIVE FREE 3 ML: 5 INJECTION INTRAVENOUS at 09:07

## 2017-07-10 RX ADMIN — HEPARIN SODIUM 5000 UNITS: 5000 INJECTION, SOLUTION INTRAVENOUS; SUBCUTANEOUS at 09:07

## 2017-07-10 RX ADMIN — LISINOPRIL 40 MG: 20 TABLET ORAL at 09:07

## 2017-07-10 RX ADMIN — SODIUM CHLORIDE, PRESERVATIVE FREE 3 ML: 5 INJECTION INTRAVENOUS at 03:07

## 2017-07-10 RX ADMIN — PANTOPRAZOLE SODIUM 40 MG: 40 TABLET, DELAYED RELEASE ORAL at 12:07

## 2017-07-10 RX ADMIN — ATORVASTATIN CALCIUM 40 MG: 20 TABLET, FILM COATED ORAL at 12:07

## 2017-07-10 RX ADMIN — ONDANSETRON 4 MG: 2 INJECTION INTRAMUSCULAR; INTRAVENOUS at 07:07

## 2017-07-10 NOTE — ED NOTES
Pt with small amount of light brown vomit to gown and blanket.  Gown and linen changed, Dr. Valdez at bedside.

## 2017-07-10 NOTE — ED NOTES
Attempted to call report, informed that the 11th floor charge nurse has not assigned the pt and he is in a rapid response.  Will call back.  ED charge nurse informed.

## 2017-07-10 NOTE — H&P
History & Physical  [facility]    SUBJECTIVE:   Chief Complaint/Reason for Admission: N/V    History of Present Illness:  Patient is a 52 y.o. male with PMH of ESRD on MWF, HTN, and esophagitis who presents to ED today with nausea and vomiting x 2 weeks.  Of note, pt was admitted 6/24 for an overnight stay for N/V, which resolved with conservative management and pt was discharged home to continue of PPI.  Pt reports he has not been able to tolerate PO intake over the past 2 weeks.  Denies hematemesis, but notes it was brown and clear.  He has still been having regular bowel movements, and denies abdominal pain.    EGD 4/4/17- Grade D reflux esophagitis; recommend repeat EGD in 3 months to check healing.  Denies weight loss, fever, chills.    Old chart was reviewed.    Past Medical History:   Diagnosis Date    Amputation stump pain 9/10/2013    Aspiration pneumonia 7/27/2015    Asterixis 11/8/2016    C. difficile colitis 8/7/2015    Cholelithiasis without obstruction 8/25/2015    Chronic diastolic heart failure     2-23-17   1 - Low normal to mildly depressed left ventricular systolic function (EF 50-55%).    2 - Right ventricular enlargement with mildly depressed systolic function.    3 - Left ventricular diastolic dysfunction.    4 - Right atrial enlargement.    5 - Severe tricuspid regurgitation.    6 - Pulmonary hypertension. The estimated PA systolic pressure is 86 mmHg.    7 - Increased central venous pressure.    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 8/1/2015    Metabolic acidosis, IAG, reduced excretion of inorganic acids     Myoclonic jerking 9/20/2016    Secondary hyperparathyroidism (of renal origin)     Secondary pulmonary hypertension 3/23/2017    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    TB lung, latent 08/25/2015    Negative Quantiferon Gold 3-23-17       Past Surgical History:   Procedure Laterality Date    COLONOSCOPY      COLONOSCOPY N/A 4/4/2017    Procedure: COLONOSCOPY;  Surgeon: Walker Setrn MD;  Location: [facility] ([address] [place] [address]);  Service: Endoscopy;  Laterality: N/A;  PA Systolic Pressure 85.56. HD Patient MWF, K+ lab prior to procedure.     FOOT AMPUTATION THROUGH METATARSAL      left foot    LEG AMPUTATION THROUGH KNEE  12/18/2013    left BKA    R AVF  9/12/12      Family History   Problem Relation Age of Onset    Early death Mother     Kidney disease Father     Hypertension Father     Heart disease Father     Hyperlipidemia Father     Diabetes Brother     Alcohol abuse Maternal Grandmother     Diabetes Maternal Grandfather     Melanoma Neg Hx        Social History   Substance Use Topics    Smoking status: Former Smoker     Packs/day: 1.00     Years: 10.00    Smokeless tobacco: Never Used    Alcohol use No      Review of patient's allergies indicates:   Allergen Reactions    Fosrenol [lanthanum] Nausea And Vomiting     Nausea and vomiting       No current facility-administered medications on file prior to encounter.      Current Outpatient Prescriptions on File Prior to Encounter   Medication Sig Dispense Refill    acetaminophen (TYLENOL) 500 MG tablet Take 1 tablet (500 mg total) by mouth every 6 (six) hours as needed for Pain.  0    amlodipine (NORVASC) 10 MG tablet Take 1 tablet (10 mg total) by mouth every morning. 90 tablet 4    atorvastatin (LIPITOR) 40 MG tablet Take 1 tablet (40 mg total) by mouth once daily. 90 tablet 4    calcium carbonate  (OS-FERNANDA) 500 mg calcium (1,250 mg) chewable tablet Take 1 tablet (500 mg total) by mouth 3 (three) times daily with meals.      carvedilol (COREG) 25 MG tablet Take 1 tablet (25 mg total) by mouth 2 (two) times daily with meals. 180 tablet 4    chlorproMAZINE (THORAZINE) 25 MG tablet Take 1 tablet (25 mg total) by mouth 3 (three) times daily as needed (Hiccups). 90 tablet 11    cinacalcet (SENSIPAR) 60 MG Tab Take 1 tablet (60 mg total) by mouth daily with breakfast. 30 tablet 3    hydrALAZINE (APRESOLINE) 50 MG tablet Take 1 tablet (50 mg total) by mouth every 8 (eight) hours as needed (systolic blood pressure (top number) > 180). 90 tablet 4    lisinopril (PRINIVIL,ZESTRIL) 40 MG tablet Take 1 tablet (40 mg total) by mouth every evening. 90 tablet 4    omeprazole (PRILOSEC) 40 MG capsule Take 1 capsule (40 mg total) by mouth 2 (two) times daily before meals. 90 capsule 3    ondansetron (ZOFRAN) 8 MG tablet Take 1 tablet (8 mg total) by mouth every 8 (eight) hours as needed for Nausea. 90 tablet 4    ondansetron (ZOFRAN-ODT) 8 MG TbDL Take 1 tablet (8 mg total) by mouth every 8 (eight) hours as needed. 10 tablet 0        Review of Systems:  Constitutional: no fever or chills  Eyes: no visual changes  ENT: no nasal congestion or sore throat  Respiratory: no cough or shortness of breath  Cardiovascular: no chest pain or palpitations  Gastrointestinal: positive for nausea, reflux symptoms and vomiting  Genitourinary: no hematuria or dysuria  Musculoskeletal: no arthralgias or myalgias  Neurological: no seizures or tremors  Endocrine: no heat or cold intolerance     OBJECTIVE:     Vital Signs (Most Recent)   Temp: 98.7 °F (37.1 °C) (07/10/17 1110)  Pulse: 98 (07/10/17 1110)  Resp: 16 (07/10/17 1110)  BP: (!) 152/87 (07/10/17 1110)  SpO2: 99 % (07/10/17 1110)  Body mass index is 27.76 kg/m².      Physical Exam:  Gen- well-developed, well-nourished, resting comfortably in NAD  Head- NC/AT, PERRL, EOMI, no oral  lesions  Neck- supple, trachea midline, no thyromegaly  CVS- S1 and S2 present, RRR, no murmurs  Resp- CTA b/l, no work of breathing  Abd- BS+, soft, NT, ND  Ext- no clubbing, cyanosis, or edema.  Left AKA  Skin- warm, no rashes  Neuro- alert and oriented x 3, exam non-focal    Laboratory:  CBC/Anemia Labs: Coags:      Recent Labs  Lab 07/10/17  0659   WBC 8.37   HGB 11.6*   HCT 33.6*      MCV 93   RDW 13.1    No results for input(s): INR, APTT in the last 168 hours.    Invalid input(s): PT     Chemistries: ABG:     Recent Labs  Lab 07/10/17  0659      K 3.6   CL 88*   CO2 36*   BUN 52*   CREATININE 11.0*   CALCIUM 11.0*   PROT 9.3*   BILITOT 0.5   ALKPHOS 226*   ALT 15   AST 20   MG 2.3   PHOS 4.7*    No results for input(s): PH, PCO2, PO2, HCO3, POCSATURATED, BE in the last 168 hours.     POCT Glucose: HbA1c:    No results for input(s): POCTGLUCOSE in the last 168 hours. Hemoglobin A1C   Date Value Ref Range Status   06/24/2017 5.1 4.0 - 5.6 % Final     Comment:     According to ADA guidelines, hemoglobin A1c <7.0% represents  optimal control in non-pregnant diabetic patients. Different  metrics may apply to specific patient populations.   Standards of Medical Care in Diabetes-2016.  For the purpose of screening for the presence of diabetes:  <5.7%     Consistent with the absence of diabetes  5.7-6.4%  Consistent with increasing risk for diabetes   (prediabetes)  >or=6.5%  Consistent with diabetes  Currently, no consensus exists for use of hemoglobin A1c  for diagnosis of diabetes for children.  This Hemoglobin A1c assay has significant interference with fetal   hemoglobin   (HbF). The results are invalid for patients with abnormal amounts of   HbF,   including those with known Hereditary Persistence   of Fetal Hemoglobin. Heterozygous hemoglobin variants (HbAS, HbAC,   HbAD, HbAE, HbA2) do not significantly interfere with this assay;   however, presence of multiple variants in a sample may impact the  "%   interference.     04/01/2012 text % Final     Comment:     Hemoglobin A1C:   SENT TO [place]   Possible contamination of HgbA1C by a Hemoglobin variant. This   specimen was sent to [facility] for analysis by an   alternate method.    05/06/2009 6.3 (H) 4.0 - 6.2 % Final        Cardiac Enzymes: Ejection Fractions:    No results for input(s): CPK, CPKMB, MB, TROPONINI in the last 72 hours. EF   Date Value Ref Range Status   02/23/2017 50 55 - 65    11/03/2016 45 55 - 65          Diagnostic Results:  Labs: Reviewed  CT: Reviewed    ASSESSMENT/PLAN:     Intractable Nausea and Vomiting  Hx of Esophagitis  -CT abd/pelvis notable for Circumferential wall thickening of the distal esophagus, this is indeterminate and may be secondary to esophagitis.  However, a neoplasm cannot be excluded.   -Pt reports he has not been taking PPI "since it got better", cannot recall how long he has been off of it  -Does feel this is similar to presentation before prior EGD  -Will start IV PPI BID and consult GI for further recs; consider repeat EGD for persistence of symptoms and/or to check healing or complication/stricture  -Had trial of PO this afternoon but had 700cc emesis; will keep NPO    ESRD on HD MWF  -Nephrology consulted for HD while inpatient  -currently volume status and electrolytes stable    Renovascular Hypertension  -Continue Amlodipine 10mg daily, Coreg 25mg BID, Lisinopril 40    DVT ppx- Heparin  CODE status- FULL    Dispo- home pending GI eval and tolerating PO intake    Deysi Alba MD  Hospital Medicine Staff     "

## 2017-07-10 NOTE — ED NOTES
Pt laying in bed, c/o nausea.  Meds provided.  Siderails up x 2, blanket and foam head rest, lights dimmed per pt requests.  Call light in reach

## 2017-07-10 NOTE — ED NOTES
House supervisor made aware that the 11th floor states that there is no bed in room at this time, asked to call transport, transport made aware

## 2017-07-10 NOTE — ED NOTES
Pt prepared for transport, denies nausea at present.  Remains awake/alert.  All belongings with pt.

## 2017-07-10 NOTE — ASSESSMENT & PLAN NOTE
[facility]  -Nephrologist: Dr. Hubbard  -Treatment duration:  3:45  -EDW:  73 kg.  -Access:  AVF to [facility]    -Last HD treatment on Friday, reported tolerated well.  -electrolytes are stable, oxygenating well on RA  -no urgent need for RRT today  -will plan on HD treatment tomorrow.

## 2017-07-10 NOTE — PROGRESS NOTES
Paged on call about possible diet change from clear liquid. Pt denies any nausea and vomiting at this time. Pt awake and alert.

## 2017-07-10 NOTE — CONSULTS
[facility]  Nephrology  Consult Note    Patient Name: Vaughn Retana  MRN: [id number]  Admission Date: 7/10/2017  Hospital Length of Stay: 0 days  Attending Provider: Deysi Alba MD   Primary Care Physician: Concepcion Landeros MD  Principal Problem:<principal problem not specified>    Inpatient consult to Nephrology  Consult performed by: YO PORRAS  Consult ordered by: JOLANTA MCGRATH  Reason for consult: ESRD MWF        Subjective:     HPI: Mr. Retana is a 53 yo AAM with PMHx of HTN, CHF, DMII, ESRD on HD MWF who presented to the ED with chief complaint of intermittent nausea/vomiting x 2 weeks.  He denies dark colored emesis or bright red blood.  He has had normal bowel movements w/o changes.  He was recently admitted on 6/24 with similar symptoms and was dx with gastritis and discharged home on zofran.  He was due for dialysis today, but was told to come to the ED for vomiting.    He dialyzes [facility] under the care of Dr. Hubbard.  His EDW is 73 kg.  Treatment duration of 3:45 minutes.  He has AVF to [facility].    Past Medical History:   Diagnosis Date    Amputation stump pain 9/10/2013    Aspiration pneumonia 7/27/2015    Asterixis 11/8/2016    C. difficile colitis 8/7/2015    Cholelithiasis without obstruction 8/25/2015    Chronic diastolic heart failure     2-23-17   1 - Low normal to mildly depressed left ventricular systolic function (EF 50-55%).    2 - Right ventricular enlargement with mildly depressed systolic function.    3 - Left ventricular diastolic dysfunction.    4 - Right atrial enlargement.    5 - Severe tricuspid regurgitation.    6 - Pulmonary hypertension. The estimated PA systolic pressure is 86 mmHg.    7 - Increased central venous pressure.    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    Metabolic acidosis, IAG, reduced excretion of inorganic acids     Myoclonic jerking 9/20/2016    Secondary hyperparathyroidism (of renal origin)     Secondary pulmonary hypertension 3/23/2017    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    TB lung, latent 08/25/2015    Negative Quantiferon Gold 3-23-17       Past Surgical History:   Procedure Laterality Date    COLONOSCOPY      COLONOSCOPY N/A 4/4/2017    Procedure: COLONOSCOPY;  Surgeon: Walker Stern MD;  Location: [facility] ([address] [place] [address]);  Service: Endoscopy;  Laterality: N/A;  PA Systolic Pressure 85.56. HD Patient MWF, K+ lab prior to procedure.     FOOT AMPUTATION THROUGH METATARSAL      left foot    LEG AMPUTATION THROUGH KNEE  12/18/2013    left BKA    R AVF  9/12/12       Review of patient's allergies indicates:   Allergen Reactions    Fosrenol [lanthanum] Nausea And Vomiting     Nausea and vomiting     Current Facility-Administered Medications   Medication Frequency    acetaminophen tablet 650 mg Q8H PRN    amlodipine tablet 10 mg QAM    atorvastatin tablet 40 mg Daily    carvedilol tablet 25 mg BID WM    [START ON 7/11/2017] cinacalcet tablet 60 mg Daily with breakfast    heparin (porcine) injection 5,000 Units Q8H    lisinopril tablet 40 mg QHS    ondansetron injection 4 mg Q8H PRN    oxycodone immediate release tablet 5 mg Q6H PRN    pantoprazole EC tablet 40 mg Daily    promethazine (PHENERGAN) 6.25 mg in dextrose 5 % 50 mL IVPB Q6H PRN    sodium chloride 0.9% flush 3 mL Q8H     Family History     Problem Relation (Age of Onset)    Alcohol abuse Maternal  Grandmother    Diabetes Brother, Maternal Grandfather    Early death Mother    Heart disease Father    Hyperlipidemia Father    Hypertension Father    Kidney disease Father        Social History Main Topics    Smoking status: Former Smoker     Packs/day: 1.00     Years: 10.00    Smokeless tobacco: Never Used    Alcohol use No    Drug use: No    Sexual activity: Not on file     Review of Systems   Constitutional: Positive for appetite change. Negative for activity change, chills, fatigue and fever.   HENT: Negative for congestion, facial swelling, sinus pressure and sore throat.    Respiratory: Negative for cough, chest tightness, shortness of breath and wheezing.    Cardiovascular: Negative for chest pain, palpitations and leg swelling.   Gastrointestinal: Positive for abdominal pain, nausea and vomiting. Negative for abdominal distention, blood in stool, constipation and diarrhea.   Skin: Negative for color change, rash and wound.   Neurological: Negative for dizziness, syncope, light-headedness and headaches.   Psychiatric/Behavioral: Negative for agitation, behavioral problems and confusion.     Objective:     Vital Signs (Most Recent):  Temp: 98.7 °F (37.1 °C) (07/10/17 1110)  Pulse: 98 (07/10/17 1110)  Resp: 16 (07/10/17 1110)  BP: (!) 152/87 (07/10/17 1110)  SpO2: 99 % (07/10/17 1110)  O2 Device (Oxygen Therapy): nasal cannula (07/10/17 0546) Vital Signs (24h Range):  Temp:  [98.7 °F (37.1 °C)-98.8 °F (37.1 °C)] 98.7 °F (37.1 °C)  Pulse:  [90-98] 98  Resp:  [15-20] 16  SpO2:  [98 %-99 %] 99 %  BP: (152-169)/(72-96) 152/87     Weight: 78 kg (172 lb) (07/10/17 0546)  Body mass index is 27.76 kg/m².  Body surface area is 1.91 meters squared.    No intake/output data recorded.    Physical Exam   Constitutional: He is oriented to person, place, and time. He appears well-developed and well-nourished. No distress.   HENT:   Head: Normocephalic and atraumatic.   Eyes: Conjunctivae and EOM are normal. Right eye  exhibits no discharge. Left eye exhibits no discharge.   Neck: Normal range of motion. Neck supple.   Cardiovascular: Normal rate and regular rhythm.  Exam reveals no gallop and no friction rub.    No murmur heard.  Pulmonary/Chest: Effort normal and breath sounds normal. No respiratory distress. He has no wheezes. He has no rales.   Abdominal: Soft. Bowel sounds are normal. He exhibits no distension.   Musculoskeletal: He exhibits no edema.   L AKA   Neurological: He is alert and oriented to person, place, and time.   Skin: Skin is warm and dry. He is not diaphoretic.   Psychiatric: He has a normal mood and affect. His behavior is normal.       Significant Labs:  CBC:   Recent Labs  Lab 07/10/17  0659   WBC 8.37   RBC 3.63*   HGB 11.6*   HCT 33.6*      MCV 93   MCH 32.0*   MCHC 34.5     CMP:   Recent Labs  Lab 07/10/17  0659   *   CALCIUM 11.0*   ALBUMIN 3.6   PROT 9.3*      K 3.6   CO2 36*   CL 88*   BUN 52*   CREATININE 11.0*   ALKPHOS 226*   ALT 15   AST 20   BILITOT 0.5           Assessment/Plan:     ESRD on hemodialysis    ESRD on HD [facility]  -[facility]  -Nephrologist: Dr. Hubbard  -Treatment duration:  3:45  -EDW:  73 kg.  -Access:  AVF to [facility]    -Last HD treatment on Friday, reported tolerated well.  -electrolytes are stable, oxygenating well on RA  -no urgent need for RRT today  -will plan on HD treatment tomorrow.          Wade Weber NP  Nephrology  [facility]  Pager:  [phone]

## 2017-07-10 NOTE — ED PROVIDER NOTES
Encounter Date: 7/10/2017    SCRIBE #1 NOTE: I, Filemon Márquez, am scribing for, and in the presence of,  Dr. Cheek. I have scribed the following portions of the note - the Resident attestation.       History     Chief Complaint   Patient presents with    Emesis     vomiting brown watery fluid     Patient is a 52 year old male with a PMHx of ESRD receiving HD 3x/week MWF, HTN, and esophagitis who presents to the ED with emesis. Patient has had intermittent, non-progressive nausea and vomiting for the past 2 weeks. Patient described the emesis as clear, brown fluid. Patient states that he was scheduled for dialysis this morning and vomited once and was asked to go to ED, he did not complete the dialysis this morning. He endorses abdominal pain over the epigastrium secondary to wretching. Patient was recently hospitalized on 6/24 for similar symptoms and was discharged the next day with zofran, which he did not get filled. Patient denies chest pain, feelings of exhaustion, diarrhea, constipation, fever or chills, sick contacts at home, and states that he has had adequate oral intake.           Review of patient's allergies indicates:   Allergen Reactions    Fosrenol [lanthanum] Nausea And Vomiting     Nausea and vomiting     Past Medical History:   Diagnosis Date    Amputation stump pain 9/10/2013    Aspiration pneumonia 7/27/2015    Asterixis 11/8/2016    C. difficile colitis 8/7/2015    Cholelithiasis without obstruction 8/25/2015    Chronic diastolic heart failure     2-23-17   1 - Low normal to mildly depressed left ventricular systolic function (EF 50-55%).    2 - Right ventricular enlargement with mildly depressed systolic function.    3 - Left ventricular diastolic dysfunction.    4 - Right atrial enlargement.    5 - Severe tricuspid regurgitation.    6 - Pulmonary hypertension. The estimated PA systolic pressure is 86 mmHg.    7 - Increased central venous pressure.     Chronic low back pain  12/1/2015    Closed head injury 9/8/2016    Diabetic neuropathy     ESRD on hemodialysis 2/7/2013    MWF at 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    Metabolic acidosis, IAG, reduced excretion of inorganic acids     Myoclonic jerking 9/20/2016    Secondary hyperparathyroidism (of renal origin)     Secondary pulmonary hypertension 3/23/2017    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    TB lung, latent 08/25/2015    Negative Quantiferon Gold 3-23-17     Past Surgical History:   Procedure Laterality Date    COLONOSCOPY      COLONOSCOPY N/A 4/4/2017    Procedure: COLONOSCOPY;  Surgeon: Walker Stern MD;  Location: [facility] ([address] [place] [address]);  Service: Endoscopy;  Laterality: N/A;  PA Systolic Pressure 85.56. HD Patient MWF, K+ lab prior to procedure.     FOOT AMPUTATION THROUGH METATARSAL      left foot    LEG AMPUTATION THROUGH KNEE  12/18/2013    left BKA    R AVF  9/12/12     Family History   Problem Relation Age of Onset    Early death Mother     Kidney disease Father     Hypertension Father     Heart disease Father     Hyperlipidemia Father     Diabetes Brother     Alcohol abuse Maternal Grandmother     Diabetes Maternal Grandfather     Melanoma Neg Hx      Social History   Substance Use Topics    Smoking status: Former Smoker     Packs/day: 1.00     Years: 10.00    Smokeless tobacco: Never Used    Alcohol use No     Review of Systems   Constitutional: Negative for appetite change, chills, fatigue and fever.   HENT: Negative for trouble swallowing.    Eyes: Negative for visual disturbance.   Respiratory:  Negative for shortness of breath.    Cardiovascular: Negative for chest pain, palpitations and leg swelling.   Gastrointestinal: Positive for abdominal pain. Negative for constipation, diarrhea, nausea and vomiting.   Genitourinary: Negative for dysuria.   Musculoskeletal: Negative for arthralgias and joint swelling.   Skin: Negative for pallor.   Neurological: Negative for dizziness, syncope, weakness, light-headedness, numbness and headaches.       Physical Exam     Initial Vitals [07/10/17 0546]   BP Pulse Resp Temp SpO2   (!) 168/72 98 18 -- 98 %      MAP       104         Physical Exam    Constitutional: He is not diaphoretic.   HENT:   Head: Normocephalic and atraumatic.   Mouth/Throat: Oropharynx is clear and moist.   Eyes: EOM are normal. Pupils are equal, round, and reactive to light.   Neck: No thyromegaly present.   Cardiovascular: Regular rhythm, normal heart sounds and intact distal pulses.   No murmur heard.  Tachycardic  1+ radial pulses bilaterally, and 1+ right DP pulse   Pulmonary/Chest: Breath sounds normal. No respiratory distress. He has no wheezes.   Abdominal: Soft. Bowel sounds are normal. He exhibits no distension. There is no tenderness. There is no rebound and no guarding.   Musculoskeletal: He exhibits no edema or tenderness.   Left AKA   Neurological: He is alert. He has normal strength.   Skin: Skin is warm and dry. No pallor.         ED Course   Procedures  Labs Reviewed   CBC W/ AUTO DIFFERENTIAL - Abnormal; Notable for the following:        Result Value    RBC 3.63 (*)     Hemoglobin 11.6 (*)     Hematocrit 33.6 (*)     MCH 32.0 (*)     All other components within normal limits   COMPREHENSIVE METABOLIC PANEL - Abnormal; Notable for the following:     Chloride 88 (*)     CO2 36 (*)     Glucose 143 (*)     BUN, Bld 52 (*)     Creatinine 11.0 (*)     Calcium 11.0 (*)     Total Protein 9.3 (*)     Alkaline Phosphatase 226 (*)     eGFR if  5.4 (*)     eGFR if non   American 4.7 (*)     All other components within normal limits   LIPASE - Abnormal; Notable for the following:     Lipase 61 (*)     All other components within normal limits   PHOSPHORUS - Abnormal; Notable for the following:     Phosphorus 4.7 (*)     All other components within normal limits   MAGNESIUM     EKG Readings: (Independently Interpreted)   Initial Reading: No STEMI. Rhythm: Normal Sinus Rhythm. Heart Rate: 93 BPM.   No elevated T-waves          Medical Decision Making:   History:   Old Medical Records: I decided to obtain old medical records.  Initial Assessment:   Patient is a 52 year old male with a PMHx of ESRD receiving HD 3x/week MWF, HTN, and esophagitis who presents to the ED with emesis. Patient has had intermittent, non-progressive nausea and vomiting for the past 2 weeks. Patient described the emesis as clear, brown fluid. Patient states that he was scheduled for dialysis this morning and vomited once and was asked to go to ED, he did not complete the dialysis this morning. He endorses abdominal pain over the epigastrium secondary to wretching. Patient was recently hospitalized on 6/24 for similar symptoms and was discharged the next day with zofran, which he did not get filled. Patient denies chest pain, feelings of exhaustion, diarrhea, constipation, fever or chills, sick contacts at home, and states that he has had adequate oral intake. Patient had a benign abdominal exam and did not appear dehydrated. Patient's emesis was clear brown fluid with brown clumps in it. Patient had Bicarb of 36 and nephrology was consulted, and patient will be admitted to observation.           Differential Diagnosis:   1) esophagitis  2) gastritis  3) gastroparesis  4) electrolyte imbalance    Independently Interpreted Test(s):   I have ordered and independently interpreted EKG Reading(s) - see prior notes  Clinical Tests:   Lab Tests: Ordered and Reviewed  Medical Tests: Reviewed and Ordered  Other:   I have  discussed this case with another health care provider.       <> Summary of the Discussion: Nephrology       APC / Resident Notes:   H/H is 11.6/33.6, WBC is normal. Patient not demonstrating signs of anemia.   7:25 AM    Patient still nauseated, vomited again in bed. Brown fluid with brown specks. Will order more zofran and CT abd/pelvis  7:53 AM    Patient appears to have a metabolic alkalosis with Bicarb of 36. AlkPhos is 226, Lipase 61, Phos 4.7. Nephro consulted, will evaluate. Patient will be admitted for observation.   8:09 AM             Scribe Attestation:   Scribe #1: I performed the above scribed service and the documentation accurately describes the services I performed. I attest to the accuracy of the note.    Attending Attestation:   Physician Attestation Statement for Resident:  As the supervising MD   Physician Attestation Statement: I have personally seen and examined this patient.   I agree with the above history. -:   As the supervising MD I agree with the above PE.    As the supervising MD I agree with the above treatment, course, plan, and disposition.   -: 52 y.o male with history of dialysis MWF, who presents with persistent nausea/vomiting ongoing for the last 2 weeks, unable to complete his dialysis today. He did have recent outpatient observation stay. He states that he has vomiting every other day. He denies any abdominal pain. last endoscope done April 2017 showed esophagitis, but normal stomach, normal duodenum. Pt is non-diabetic. My initial differential diagnoses include but are not limited nausea/vomiting ,gastroparesis; doubt cardiac. We will do labs. He has not had CT of the abdomen, so we may consider this. We will control his nausea while awaiting results.   I have reviewed and agree with the residents interpretation of the following: lab data and EKG.  I have reviewed the following: old records at this facility.          Physician Attestation for Scribe:  Physician Attestation  Statement for Scribe #1: I, Dr. Cheek, reviewed documentation, as scribed by Filemon Márquez in my presence, and it is both accurate and complete.         Attending ED Notes:   8:20 AM. Labs reviewed, with significant electrolyte changes secondary to his vomiting. I feel that he is again having intractable vomiting and has potential for further distortion of his labs. For this reason, we have consulted the Nephrology team, and we will put this pt into Internal Medicine as an observation patient.           ED Course     Clinical Impression:   Intractable vomiting, esophagitis.    Disposition:   Disposition: Placed in Observation  Condition: Serious                        Cesar Valdez MD  Resident  07/10/17 1024       Nando Cheek MD  07/11/17 0719

## 2017-07-10 NOTE — PROGRESS NOTES
Notified Dr. Alba, pt had episode of dark brown emesis with undigested food x1.  Pt states he only had some soup prior to emesis episode.  Admin prn nausea per MD orders.  Will reassess response to antiemetic and document.

## 2017-07-10 NOTE — ED NOTES
Vaughn Retana, a 52 y.o. male presents to the ED bed 17      Chief Complaint   Patient presents with    Emesis     vomiting brown watery fluid     Pt states he has been vomiting for the past 2 weeks and was seen here between then for same complaint. He was at the dialysis center today and they advised pt to come to ER; did not complete treatment. Denies any pain.   Pt was prescribed with zofran on 6/24 and 6/28 and never filled medications.     Review of patient's allergies indicates:   Allergen Reactions    Fosrenol [lanthanum] Nausea And Vomiting     Nausea and vomiting     Past Medical History:   Diagnosis Date    Amputation stump pain 9/10/2013    Aspiration pneumonia 7/27/2015    Asterixis 11/8/2016    C. difficile colitis 8/7/2015    Cholelithiasis without obstruction 8/25/2015    Chronic diastolic heart failure     2-23-17   1 - Low normal to mildly depressed left ventricular systolic function (EF 50-55%).    2 - Right ventricular enlargement with mildly depressed systolic function.    3 - Left ventricular diastolic dysfunction.    4 - Right atrial enlargement.    5 - Severe tricuspid regurgitation.    6 - Pulmonary hypertension. The estimated PA systolic pressure is 86 mmHg.    7 - Increased central venous pressure.    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

## 2017-07-10 NOTE — ED NOTES
Report called to ISMAEL Lopez, who states that there is not a bed in the room.  She will call when a bed is placed in the room .  ED charge aware.

## 2017-07-10 NOTE — SUBJECTIVE & OBJECTIVE
Past Medical History:   Diagnosis Date    Amputation stump pain 9/10/2013    Aspiration pneumonia 7/27/2015    Asterixis 11/8/2016    C. difficile colitis 8/7/2015    Cholelithiasis without obstruction 8/25/2015    Chronic diastolic heart failure     2-23-17   1 - Low normal to mildly depressed left ventricular systolic function (EF 50-55%).    2 - Right ventricular enlargement with mildly depressed systolic function.    3 - Left ventricular diastolic dysfunction.    4 - Right atrial enlargement.    5 - Severe tricuspid regurgitation.    6 - Pulmonary hypertension. The estimated PA systolic pressure is 86 mmHg.    7 - Increased central venous pressure.    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    Metabolic acidosis, IAG, reduced excretion of inorganic acids     Myoclonic jerking 9/20/2016    Secondary hyperparathyroidism (of renal origin)     Secondary pulmonary hypertension 3/23/2017    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    TB lung, latent 08/25/2015    Negative Quantiferon Gold 3-23-17       Past Surgical History:   Procedure Laterality Date    COLONOSCOPY      COLONOSCOPY N/A 4/4/2017    Procedure: COLONOSCOPY;  Surgeon: Walker Stern MD;  Location: [facility] ([address] [place] [address]);  Service: Endoscopy;  Laterality: N/A;  PA Systolic Pressure 85.56. HD Patient MWF, K+ lab prior to procedure.     FOOT AMPUTATION THROUGH METATARSAL      left foot    LEG AMPUTATION THROUGH KNEE  12/18/2013     left BKA    R AVF  9/12/12       Review of patient's allergies indicates:   Allergen Reactions    Fosrenol [lanthanum] Nausea And Vomiting     Nausea and vomiting     Current Facility-Administered Medications   Medication Frequency    acetaminophen tablet 650 mg Q8H PRN    amlodipine tablet 10 mg QAM    atorvastatin tablet 40 mg Daily    carvedilol tablet 25 mg BID WM    [START ON 7/11/2017] cinacalcet tablet 60 mg Daily with breakfast    heparin (porcine) injection 5,000 Units Q8H    lisinopril tablet 40 mg QHS    ondansetron injection 4 mg Q8H PRN    oxycodone immediate release tablet 5 mg Q6H PRN    pantoprazole EC tablet 40 mg Daily    promethazine (PHENERGAN) 6.25 mg in dextrose 5 % 50 mL IVPB Q6H PRN    sodium chloride 0.9% flush 3 mL Q8H     Family History     Problem Relation (Age of Onset)    Alcohol abuse Maternal Grandmother    Diabetes Brother, Maternal Grandfather    Early death Mother    Heart disease Father    Hyperlipidemia Father    Hypertension Father    Kidney disease Father        Social History Main Topics    Smoking status: Former Smoker     Packs/day: 1.00     Years: 10.00    Smokeless tobacco: Never Used    Alcohol use No    Drug use: No    Sexual activity: Not on file     Review of Systems   Constitutional: Positive for appetite change. Negative for activity change, chills, fatigue and fever.   HENT: Negative for congestion, facial swelling, sinus pressure and sore throat.    Respiratory: Negative for cough, chest tightness, shortness of breath and wheezing.    Cardiovascular: Negative for chest pain, palpitations and leg swelling.   Gastrointestinal: Positive for abdominal pain, nausea and vomiting. Negative for abdominal distention, blood in stool, constipation and diarrhea.   Skin: Negative for color change, rash and wound.   Neurological: Negative for dizziness, syncope, light-headedness and headaches.   Psychiatric/Behavioral: Negative for agitation, behavioral  problems and confusion.     Objective:     Vital Signs (Most Recent):  Temp: 98.7 °F (37.1 °C) (07/10/17 1110)  Pulse: 98 (07/10/17 1110)  Resp: 16 (07/10/17 1110)  BP: (!) 152/87 (07/10/17 1110)  SpO2: 99 % (07/10/17 1110)  O2 Device (Oxygen Therapy): nasal cannula (07/10/17 0546) Vital Signs (24h Range):  Temp:  [98.7 °F (37.1 °C)-98.8 °F (37.1 °C)] 98.7 °F (37.1 °C)  Pulse:  [90-98] 98  Resp:  [15-20] 16  SpO2:  [98 %-99 %] 99 %  BP: (152-169)/(72-96) 152/87     Weight: 78 kg (172 lb) (07/10/17 0546)  Body mass index is 27.76 kg/m².  Body surface area is 1.91 meters squared.    No intake/output data recorded.    Physical Exam   Constitutional: He is oriented to person, place, and time. He appears well-developed and well-nourished. No distress.   HENT:   Head: Normocephalic and atraumatic.   Eyes: Conjunctivae and EOM are normal. Right eye exhibits no discharge. Left eye exhibits no discharge.   Neck: Normal range of motion. Neck supple.   Cardiovascular: Normal rate and regular rhythm.  Exam reveals no gallop and no friction rub.    No murmur heard.  Pulmonary/Chest: Effort normal and breath sounds normal. No respiratory distress. He has no wheezes. He has no rales.   Abdominal: Soft. Bowel sounds are normal. He exhibits no distension.   Musculoskeletal: He exhibits no edema.   L AKA   Neurological: He is alert and oriented to person, place, and time.   Skin: Skin is warm and dry. He is not diaphoretic.   Psychiatric: He has a normal mood and affect. His behavior is normal.       Significant Labs:  CBC:   Recent Labs  Lab 07/10/17  0659   WBC 8.37   RBC 3.63*   HGB 11.6*   HCT 33.6*      MCV 93   MCH 32.0*   MCHC 34.5     CMP:   Recent Labs  Lab 07/10/17  0659   *   CALCIUM 11.0*   ALBUMIN 3.6   PROT 9.3*      K 3.6   CO2 36*   CL 88*   BUN 52*   CREATININE 11.0*   ALKPHOS 226*   ALT 15   AST 20   BILITOT 0.5

## 2017-07-10 NOTE — HPI
Mr. Retana is a 53 yo AAM with PMHx of HTN, CHF, DMII, ESRD on HD MWF who presented to the ED with chief complaint of intermittent nausea/vomiting x 2 weeks.  He denies dark colored emesis or bright red blood.  He has had normal bowel movements w/o changes.  He was recently admitted on 6/24 with similar symptoms and was dx with gastritis and discharged home on zofran.  He was due for dialysis today, but was told to come to the ED for vomiting.    He dialyzes MWF and [facility] under the care of Dr. Hubbard.  His EDW is 73 kg.  Treatment duration of 3:45 minutes.  He has AVF to [facility].

## 2017-07-10 NOTE — ED NOTES
Pt identifiers Vaughn HENAO Mazin checked and correct  LOC: The patient is awake, alert, aware of environment with an appropriate affect. Oriented x3, speaking appropriately  APPEARANCE: Pt resting comfortably, in no acute distress, pt is clean and well groomed, clothing properly fastened  SKIN: Skin warm, dry and intact, normal skin turgor, moist mucus membranes  RESPIRATORY: Airway is open and patent, respirations are spontaneous, even and unlabored, normal effort and rate  CARDIAC: Normal rate and rhythm, no peripheral edema noted, capillary refill < 3 seconds, bilateral radial pulses 2+  ABDOMEN: Soft, nontender, nondistended. Bowel sounds present. +N/V brown liquid; denies any abd pain  NEUROLOGIC: PERRL, facial expression is symmetrical, patient moving all extremities spontaneously, normal sensation in all extremities when touched with a finger.  Follows all commands appropriately  MUSCULOSKELETAL: No obvious deformities.

## 2017-07-11 LAB
ALBUMIN SERPL BCP-MCNC: 2.7 G/DL
ANION GAP SERPL CALC-SCNC: 18 MMOL/L
BASOPHILS # BLD AUTO: 0.02 K/UL
BASOPHILS NFR BLD: 0.3 %
BUN SERPL-MCNC: 55 MG/DL
CALCIUM SERPL-MCNC: 9.2 MG/DL
CHLORIDE SERPL-SCNC: 90 MMOL/L
CO2 SERPL-SCNC: 28 MMOL/L
CREAT SERPL-MCNC: 11.9 MG/DL
DIFFERENTIAL METHOD: ABNORMAL
EOSINOPHIL # BLD AUTO: 0.3 K/UL
EOSINOPHIL NFR BLD: 4.8 %
ERYTHROCYTE [DISTWIDTH] IN BLOOD BY AUTOMATED COUNT: 13.1 %
EST. GFR  (AFRICAN AMERICAN): 5 ML/MIN/1.73 M^2
EST. GFR  (NON AFRICAN AMERICAN): 4.3 ML/MIN/1.73 M^2
GLUCOSE SERPL-MCNC: 93 MG/DL
HCT VFR BLD AUTO: 27.2 %
HGB BLD-MCNC: 9.7 G/DL
LYMPHOCYTES # BLD AUTO: 1.9 K/UL
LYMPHOCYTES NFR BLD: 29.8 %
MCH RBC QN AUTO: 32.8 PG
MCHC RBC AUTO-ENTMCNC: 35.7 %
MCV RBC AUTO: 92 FL
MONOCYTES # BLD AUTO: 0.6 K/UL
MONOCYTES NFR BLD: 9.7 %
NEUTROPHILS # BLD AUTO: 3.5 K/UL
NEUTROPHILS NFR BLD: 55.2 %
PHOSPHATE SERPL-MCNC: 5.2 MG/DL
PLATELET # BLD AUTO: 189 K/UL
PMV BLD AUTO: 10 FL
POTASSIUM SERPL-SCNC: 3.6 MMOL/L
RBC # BLD AUTO: 2.96 M/UL
SODIUM SERPL-SCNC: 136 MMOL/L
WBC # BLD AUTO: 6.28 K/UL

## 2017-07-11 PROCEDURE — G0257 UNSCHED DIALYSIS ESRD PT HOS: HCPCS

## 2017-07-11 PROCEDURE — 80069 RENAL FUNCTION PANEL: CPT

## 2017-07-11 PROCEDURE — 99214 OFFICE O/P EST MOD 30 MIN: CPT | Mod: GC,,, | Performed by: INTERNAL MEDICINE

## 2017-07-11 PROCEDURE — 80100016 HC MAINTENANCE HEMODIALYSIS

## 2017-07-11 PROCEDURE — 85025 COMPLETE CBC W/AUTO DIFF WBC: CPT

## 2017-07-11 PROCEDURE — 99226 PR SUBSEQUENT OBSERVATION CARE,LEVEL III: CPT | Mod: ,,, | Performed by: INTERNAL MEDICINE

## 2017-07-11 PROCEDURE — C9113 INJ PANTOPRAZOLE SODIUM, VIA: HCPCS | Performed by: HOSPITALIST

## 2017-07-11 PROCEDURE — G0378 HOSPITAL OBSERVATION PER HR: HCPCS

## 2017-07-11 PROCEDURE — 25000003 PHARM REV CODE 250: Performed by: HOSPITALIST

## 2017-07-11 PROCEDURE — 63600175 PHARM REV CODE 636 W HCPCS: Performed by: HOSPITALIST

## 2017-07-11 RX ADMIN — AMLODIPINE BESYLATE 10 MG: 10 TABLET ORAL at 06:07

## 2017-07-11 RX ADMIN — HEPARIN SODIUM 5000 UNITS: 5000 INJECTION, SOLUTION INTRAVENOUS; SUBCUTANEOUS at 09:07

## 2017-07-11 RX ADMIN — SODIUM CHLORIDE, PRESERVATIVE FREE 3 ML: 5 INJECTION INTRAVENOUS at 09:07

## 2017-07-11 RX ADMIN — HEPARIN SODIUM 5000 UNITS: 5000 INJECTION, SOLUTION INTRAVENOUS; SUBCUTANEOUS at 06:07

## 2017-07-11 RX ADMIN — SODIUM CHLORIDE, PRESERVATIVE FREE 3 ML: 5 INJECTION INTRAVENOUS at 06:07

## 2017-07-11 RX ADMIN — PANTOPRAZOLE SODIUM 40 MG: 40 INJECTION, POWDER, FOR SOLUTION INTRAVENOUS at 09:07

## 2017-07-11 RX ADMIN — CARVEDILOL 25 MG: 12.5 TABLET, FILM COATED ORAL at 06:07

## 2017-07-11 RX ADMIN — LISINOPRIL 40 MG: 20 TABLET ORAL at 09:07

## 2017-07-11 NOTE — SUBJECTIVE & OBJECTIVE
Past Medical History:   Diagnosis Date    Amputation stump pain 9/10/2013    Aspiration pneumonia 7/27/2015    Asterixis 11/8/2016    C. difficile colitis 8/7/2015    Cholelithiasis without obstruction 8/25/2015    Chronic diastolic heart failure     2-23-17   1 - Low normal to mildly depressed left ventricular systolic function (EF 50-55%).    2 - Right ventricular enlargement with mildly depressed systolic function.    3 - Left ventricular diastolic dysfunction.    4 - Right atrial enlargement.    5 - Severe tricuspid regurgitation.    6 - Pulmonary hypertension. The estimated PA systolic pressure is 86 mmHg.    7 - Increased central venous pressure.    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    Metabolic acidosis, IAG, reduced excretion of inorganic acids     Myoclonic jerking 9/20/2016    Secondary hyperparathyroidism (of renal origin)     Secondary pulmonary hypertension 3/23/2017    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    TB lung, latent 08/25/2015    Negative Quantiferon Gold 3-23-17       Past Surgical History:   Procedure Laterality Date    COLONOSCOPY      COLONOSCOPY N/A 4/4/2017    Procedure: COLONOSCOPY;  Surgeon: Walker Stern MD;  Location: [facility] ([address] [place] [address]);  Service: Endoscopy;  Laterality: N/A;  PA Systolic Pressure 85.56. HD Patient MWF, K+ lab prior to procedure.     FOOT AMPUTATION THROUGH METATARSAL      left foot    LEG AMPUTATION THROUGH KNEE  12/18/2013     left BKA    R AVF  9/12/12       Review of patient's allergies indicates:   Allergen Reactions    Fosrenol [lanthanum] Nausea And Vomiting     Nausea and vomiting     Family History     Problem Relation (Age of Onset)    Alcohol abuse Maternal Grandmother    Diabetes Brother, Maternal Grandfather    Early death Mother    Heart disease Father    Hyperlipidemia Father    Hypertension Father    Kidney disease Father        Social History Main Topics    Smoking status: Former Smoker     Packs/day: 1.00     Years: 10.00    Smokeless tobacco: Never Used    Alcohol use No    Drug use: No    Sexual activity: Not on file     Review of Systems   Constitutional: Negative for activity change and appetite change.   HENT: Negative for congestion and facial swelling.    Eyes: Negative for discharge and itching.   Respiratory: Negative for apnea, chest tightness and shortness of breath.    Cardiovascular: Negative for chest pain and palpitations.   Gastrointestinal: Negative for abdominal distention, anal bleeding, blood in stool, diarrhea, nausea and vomiting.   Endocrine: Negative for cold intolerance and heat intolerance.   Genitourinary: Negative for difficulty urinating and dysuria.   Skin: Negative for color change and pallor.   Neurological: Negative for dizziness and headaches.   Psychiatric/Behavioral: Negative for agitation and behavioral problems.     Objective:     Vital Signs (Most Recent):  Temp: 97.8 °F (36.6 °C) (07/11/17 0800)  Pulse: 78 (07/11/17 1045)  Resp: 18 (07/11/17 1045)  BP: 119/70 (07/11/17 1045)  SpO2: 98 % (07/11/17 0744) Vital Signs (24h Range):  Temp:  [97.8 °F (36.6 °C)-99.7 °F (37.6 °C)] 97.8 °F (36.6 °C)  Pulse:  [75-95] 78  Resp:  [18-20] 18  SpO2:  [97 %-100 %] 98 %  BP: (119-176)/(65-90) 119/70     Weight: 78 kg (172 lb) (07/10/17 1110)  Body mass index is 27.76 kg/m².      Intake/Output Summary (Last 24 hours) at 07/11/17 1111  Last data filed at 07/10/17 1200   Gross per 24 hour    Intake              180 ml   Output              800 ml   Net             -620 ml       Lines/Drains/Airways     Drain                 Hemodialysis AV Graft 01/22/11 1828 Right upper arm 2361 days          Peripheral Intravenous Line                 Peripheral IV - Single Lumen 07/10/17 0603 Left Hand 1 day                Physical Exam   Constitutional: He is oriented to person, place, and time. He appears well-developed and well-nourished.   HENT:   Head: Normocephalic and atraumatic.   Eyes: Conjunctivae and EOM are normal.   Neck: No JVD present. No tracheal deviation present.   Cardiovascular: Normal rate, regular rhythm and normal heart sounds.    Pulmonary/Chest: Effort normal and breath sounds normal.   Abdominal: Soft. Bowel sounds are normal. There is no hepatosplenomegaly. There is no tenderness.   Musculoskeletal: He exhibits no edema or deformity.   Neurological: He is alert and oriented to person, place, and time.   Skin: Skin is warm and dry.   Psychiatric: He has a normal mood and affect. His behavior is normal.       Significant Labs:  CBC:   Recent Labs  Lab 07/10/17  0659 07/11/17  0828   WBC 8.37 6.28   HGB 11.6* 9.7*   HCT 33.6* 27.2*    189     BMP:   Recent Labs  Lab 07/10/17  0659 07/11/17  0828   * 93    136   K 3.6 3.6   CL 88* 90*   CO2 36* 28   BUN 52* 55*   CREATININE 11.0* 11.9*   CALCIUM 11.0* 9.2   MG 2.3  --      CMP:   Recent Labs  Lab 07/10/17  0659 07/11/17  0828   * 93   CALCIUM 11.0* 9.2   ALBUMIN 3.6 2.7*   PROT 9.3*  --     136   K 3.6 3.6   CO2 36* 28   CL 88* 90*   BUN 52* 55*   CREATININE 11.0* 11.9*   ALKPHOS 226*  --    ALT 15  --    AST 20  --    BILITOT 0.5  --      Lipase:   Recent Labs  Lab 07/10/17  0659   LIPASE 61*     Liver Function Test:   Recent Labs  Lab 07/10/17  0659 07/11/17  0828   ALT 15  --    AST 20  --    ALKPHOS 226*  --    BILITOT 0.5  --    PROT 9.3*  --    ALBUMIN 3.6 2.7*       Significant Imaging:  CT  Abdomen/Pelvis with contrast 07/10/2017:  Circumferential wall thickening of the distal esophagus, this is indeterminate and may be secondary to esophagitis.  However, a neoplasm cannot be excluded.  Correlation with EGD findings is recommended.  Large dependent gallstone without evidence of cholelithiasis.  Bilateral chronic medical renal disease and renal osteodystrophy.  Calcific atherosclerosis of the aorta and branch vessels.  Colonoscopy 04/04/2017:  Impression:             - Preparation of the colon was fair.  - One 3 mm polyp in the transverse colon, removed with a jumbo cold forceps. Resected and retrieved.  - One 8 mm polyp in the sigmoid colon, removed with a cold snare. Resected and retrieved.  - The examination was otherwise normal on direct and retroflexion views.  Upper GI Endoscopy:  Impression:             - LA Grade D reflux esophagitis.   - Small hiatal hernia.   - Normal stomach.  - Normal examined duodenum.  - No specimens collected.

## 2017-07-11 NOTE — PROGRESS NOTES
Maintenance HD treatment started. No complications with access to right upper arm. Lines secured and telemetry in place. No complaints of discomfort at this time.

## 2017-07-11 NOTE — PLAN OF CARE
Problem: Patient Care Overview  Goal: Plan of Care Review  No issues throughout shift. No complaints of pain.

## 2017-07-11 NOTE — PROGRESS NOTES
I have reviewed and concur with the NP's hemodialysis prescription plan. I have personally interviewed and examined the patient at bedside during hemodialysis session

## 2017-07-11 NOTE — CONSULTS
[facility]  Gastroenterology  Consult Note    Patient Name: Vaughn Retana  MRN: [id number]  Admission Date: 7/10/2017  Hospital Length of Stay: 0 days  Code Status: Full Code   Attending Provider: Cecily Suazo MD   Consulting Provider: Denice Zee MD  Primary Care Physician: Concepcion Landeros MD  Principal Problem:Intractable vomiting with nausea    Inpatient consult to Gastroenterology  Consult performed by: DENICE ZEE  Consult ordered by: ALISIA MAYS        Subjective:     HPI:  Vaughn Retana is a 52 y.o. M with ESRD (HD MWF), HTN, and esophagitis who presents to ED yesterday c/o nausea and emesis over the past 3 weeks. Patient states that he is unsure why the symptoms began as he denies any change in his diet, medications, denies sick contacts or travel around that time. He reports that he was nauseated constantly and vomited 2x daily afer meals 2 or 3 days out of the week so he went to the ED on 06/24/2017. He was managed conservatively, his symptoms resolved, and he was discharged home with a PPI the following day. He reports that the N/V returned shortly thereafter. His symptoms persisted for the following 2 weeks and he represented to the ED yesterday. Over this time period, he denies hematemesis, any change in his BMs, abdominal pain, CP/SOB, and states that he was able to tolerate po intake fine on days that he did not vomit. He states that his symptoms have completely resolved since admission yesterday and he is very hungry. Currently in HD.     EGD 4/4/17- Grade D reflux esophagitis; recommend repeat EGD in 3 months to check healing.  Denies weight loss, fever, chills.      Past Medical History:   Diagnosis Date    Amputation stump pain 9/10/2013    Aspiration pneumonia 7/27/2015    Asterixis 11/8/2016    C. difficile colitis 8/7/2015    Cholelithiasis without obstruction 8/25/2015    Chronic diastolic heart failure     2-23-17   1 - Low normal to  mildly depressed left ventricular systolic function (EF 50-55%).    2 - Right ventricular enlargement with mildly depressed systolic function.    3 - Left ventricular diastolic dysfunction.    4 - Right atrial enlargement.    5 - Severe tricuspid regurgitation.    6 - Pulmonary hypertension. The estimated PA systolic pressure is 86 mmHg.    7 - Increased central venous pressure.    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    Metabolic acidosis, IAG, reduced excretion of inorganic acids     Myoclonic jerking 9/20/2016    Secondary hyperparathyroidism (of renal origin)     Secondary pulmonary hypertension 3/23/2017    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 HD Patient MWF, K+ lab prior to procedure.     FOOT AMPUTATION THROUGH METATARSAL      left foot    LEG AMPUTATION THROUGH KNEE  12/18/2013    left BKA    R AVF  9/12/12       Review of patient's allergies indicates:   Allergen Reactions    Fosrenol [lanthanum] Nausea And Vomiting     Nausea and vomiting     Family History     Problem Relation (Age of Onset)    Alcohol abuse Maternal Grandmother    Diabetes Brother,  Maternal Grandfather    Early death Mother    Heart disease Father    Hyperlipidemia Father    Hypertension Father    Kidney disease Father        Social History Main Topics    Smoking status: Former Smoker     Packs/day: 1.00     Years: 10.00    Smokeless tobacco: Never Used    Alcohol use No    Drug use: No    Sexual activity: Not on file     Review of Systems   Constitutional: Negative for activity change and appetite change.   HENT: Negative for congestion and facial swelling.    Eyes: Negative for discharge and itching.   Respiratory: Negative for apnea, chest tightness and shortness of breath.    Cardiovascular: Negative for chest pain and palpitations.   Gastrointestinal: Negative for abdominal distention, anal bleeding, blood in stool, diarrhea, nausea and vomiting.   Endocrine: Negative for cold intolerance and heat intolerance.   Genitourinary: Negative for difficulty urinating and dysuria.   Skin: Negative for color change and pallor.   Neurological: Negative for dizziness and headaches.   Psychiatric/Behavioral: Negative for agitation and behavioral problems.     Objective:     Vital Signs (Most Recent):  Temp: 97.8 °F (36.6 °C) (07/11/17 0800)  Pulse: 78 (07/11/17 1045)  Resp: 18 (07/11/17 1045)  BP: 119/70 (07/11/17 1045)  SpO2: 98 % (07/11/17 0744) Vital Signs (24h Range):  Temp:  [97.8 °F (36.6 °C)-99.7 °F (37.6 °C)] 97.8 °F (36.6 °C)  Pulse:  [75-95] 78  Resp:  [18-20] 18  SpO2:  [97 %-100 %] 98 %  BP: (119-176)/(65-90) 119/70     Weight: 78 kg (172 lb) (07/10/17 1110)  Body mass index is 27.76 kg/m².      Intake/Output Summary (Last 24 hours) at 07/11/17 1111  Last data filed at 07/10/17 1200   Gross per 24 hour   Intake              180 ml   Output              800 ml   Net             -620 ml       Lines/Drains/Airways     Drain                 Hemodialysis AV Graft 01/22/11 1828 Right upper arm 2361 days          Peripheral Intravenous Line                 Peripheral IV - Single Lumen  07/10/17 0603 Left Hand 1 day                Physical Exam   Constitutional: He is oriented to person, place, and time. He appears well-developed and well-nourished.   HENT:   Head: Normocephalic and atraumatic.   Eyes: Conjunctivae and EOM are normal.   Neck: No JVD present. No tracheal deviation present.   Cardiovascular: Normal rate, regular rhythm and normal heart sounds.    Pulmonary/Chest: Effort normal and breath sounds normal.   Abdominal: Soft. Bowel sounds are normal. There is no hepatosplenomegaly. There is no tenderness.   Musculoskeletal: He exhibits no edema or deformity.   Neurological: He is alert and oriented to person, place, and time.   Skin: Skin is warm and dry.   Psychiatric: He has a normal mood and affect. His behavior is normal.       Significant Labs:  CBC:   Recent Labs  Lab 07/10/17  0659 07/11/17  0828   WBC 8.37 6.28   HGB 11.6* 9.7*   HCT 33.6* 27.2*    189     BMP:   Recent Labs  Lab 07/10/17  0659 07/11/17  0828   * 93    136   K 3.6 3.6   CL 88* 90*   CO2 36* 28   BUN 52* 55*   CREATININE 11.0* 11.9*   CALCIUM 11.0* 9.2   MG 2.3  --      CMP:   Recent Labs  Lab 07/10/17  0659 07/11/17  0828   * 93   CALCIUM 11.0* 9.2   ALBUMIN 3.6 2.7*   PROT 9.3*  --     136   K 3.6 3.6   CO2 36* 28   CL 88* 90*   BUN 52* 55*   CREATININE 11.0* 11.9*   ALKPHOS 226*  --    ALT 15  --    AST 20  --    BILITOT 0.5  --      Lipase:   Recent Labs  Lab 07/10/17  0659   LIPASE 61*     Liver Function Test:   Recent Labs  Lab 07/10/17  0659 07/11/17 0828   ALT 15  --    AST 20  --    ALKPHOS 226*  --    BILITOT 0.5  --    PROT 9.3*  --    ALBUMIN 3.6 2.7*       Significant Imaging:  CT Abdomen/Pelvis with contrast 07/10/2017:  Circumferential wall thickening of the distal esophagus, this is indeterminate and may be secondary to esophagitis.  However, a neoplasm cannot be excluded.  Correlation with EGD findings is recommended.  Large dependent gallstone without evidence of  cholelithiasis.  Bilateral chronic medical renal disease and renal osteodystrophy.  Calcific atherosclerosis of the aorta and branch vessels.  Colonoscopy 04/04/2017:  Impression:             - Preparation of the colon was fair.  - One 3 mm polyp in the transverse colon, removed with a jumbo cold forceps. Resected and retrieved.  - One 8 mm polyp in the sigmoid colon, removed with a cold snare. Resected and retrieved.  - The examination was otherwise normal on direct and retroflexion views.  Upper GI Endoscopy 04/04/2017:  Impression:             - LA Grade D reflux esophagitis.   - Small hiatal hernia.   - Normal stomach.  - Normal examined duodenum.  - No specimens collected.    Assessment/Plan:     Nausea and vomiting    Vaughn Retana is a 52 y.o. M who presented to the ED with 3 weeks of nausea and post-prandial nonbloody, bilious emesis 2x daily, 2-3x weekly. Patient denies any recent travel, illnesses or sick contacts, changes to diet or medication. Denies abdominal pain, change in BMs, CP/SOB, melena, hematemesis since the onset of his symptoms. His N/V has resolved since admission yesterday after receiving 2 doses of Zofran IV.    -Advance diet as tolerated as patient does not require inpatient EGD at this time.   -Due to findings of esophagitis on EGD from 04/04/217, 3 month Repeat EGD was recommended at that time. Schedule EGD outpatient.   -Continue home dose of PPI 40mg BID  -Recommend PO antiemetic to control N/V              Thank you for your consult. I will sign off. Please contact us if you have any additional questions.    Jerad Veras MD  Internal Medicine PGY-1  [facility]

## 2017-07-11 NOTE — PROGRESS NOTES
Notified MD pt is refusing AM meds upon return from dialysis since he is unable to eat d/t NPO status at this time.

## 2017-07-11 NOTE — ASSESSMENT & PLAN NOTE
Vaughn Retana is a 52 y.o. M who presented to the ED with 3 weeks of nausea and post-prandial nonbloody, bilious emesis 2x daily, 2-3x weekly. Patient denies any recent travel, illnesses or sick contacts, changes to diet or medication. Denies abdominal pain, change in BMs, CP/SOB, melena, hematemesis since the onset of his symptoms. His N/V has resolved since admission yesterday after receiving 2 doses of Zofran IV.    -Advance diet as tolerated as patient does not require inpatient EGD at this time.   -Due to findings of esophagitis on EGD from 04/04/217, 3 month Repeat EGD was recommended at that time. Schedule EGD outpatient.   -Continue home dose of PPI 40mg BID  -Recommend PO antiemetic to control N/V

## 2017-07-11 NOTE — PROGRESS NOTES
Pt leaving floor per stretcher for dialysis.  VSS, no c.o pain at this time.  Gastroenterology MD at bedside and will return with team later today to see pt and discuss plan.

## 2017-07-11 NOTE — PLAN OF CARE
Problem: Patient Care Overview  Goal: Plan of Care Review  Outcome: Ongoing (interventions implemented as appropriate)  HD treatment complete. Duration of treatment 3 hours and 4 L removed. Treatment was tolerated well and no complications with access to right upper arm. Needles removed and hemostasis achieved. Dressing intact and no drainage noted. Thrill and bruit present.

## 2017-07-11 NOTE — HPI
Vaughn Retana is a 52 y.o. M with ESRD (HD MWF), HTN, and esophagitis who presents to ED yesterday c/o nausea and emesis over the past 3 weeks. Patient states that he is unsure why the symptoms began as he denies any change in his diet, medications, denies sick contacts or travel around that time. He reports that he was nauseated constantly and vomited 2x daily afer meals 2 or 3 days out of the week so he went to the ED on 06/24/2017. He was managed conservatively, his symptoms resolved, and he was discharged home with a PPI the following day. He reports that the N/V returned shortly thereafter. His symptoms persisted for the following 2 weeks and he represented to the ED yesterday. Over this time period, he denies hematemesis, any change in his BMs, abdominal pain, CP/SOB, and states that he was able to tolerate po intake fine on days that he did not vomit. He states that his symptoms have completely resolved since admission yesterday and he is very hungry. Currently in HD.     EGD 4/4/17- Grade D reflux esophagitis; recommend repeat EGD in 3 months to check healing.  Denies weight loss, fever, chills.

## 2017-07-11 NOTE — PLAN OF CARE
07/11/17 1201   Discharge Assessment   Assessment Type Discharge Planning Assessment   Confirmed/corrected address and phone number on facesheet? Yes   Assessment information obtained from? Patient   Expected Length of Stay (days) 2   Communicated expected length of stay with patient/caregiver yes   Prior to hospitilization cognitive status: Alert/Oriented;No Deficits   Prior to hospitalization functional status: Independent   Current cognitive status: Alert/Oriented;No Deficits   Current Functional Status: Independent   Arrived From home or self-care   Lives With other relative(s)   Able to Return to Prior Arrangements yes   Is patient able to care for self after discharge? Yes   How many people do you have in your home that can help with your care after discharge? 2   Who are your caregiver(s) and their phone number(s)? lives w niece and nephew   Patient's perception of discharge disposition home or selfcare   Readmission Within The Last 30 Days no previous admission in last 30 days   Patient currently being followed by outpatient case management? No   Patient currently receives home health services? No   Does the patient currently use HME? No   Patient currently receives private duty nursing? N/A   Patient currently receives any other outside agency services? No   Equipment Currently Used at Home prosthesis   Do you have any problems affording any of your prescribed medications? No   Is the patient taking medications as prescribed? yes   Do you have any financial concerns preventing you from receiving the healthcare you need? No   Does the patient have transportation to healthcare appointments? Yes   Transportation Available family or friend will provide   On Dialysis? Yes   If yes, what is the name of the dialysis unit? [facility]    Does the patient receive outpatient dialysis? Yes   Discharge Plan A Home with family   Discharge Plan B Home with family   Patient/Family In Agreement With Plan yes

## 2017-07-12 ENCOUNTER — TELEPHONE (OUTPATIENT)
Dept: GASTROENTEROLOGY | Facility: CLINIC | Age: 52
End: 2017-07-12

## 2017-07-12 VITALS
WEIGHT: 172 LBS | SYSTOLIC BLOOD PRESSURE: 155 MMHG | DIASTOLIC BLOOD PRESSURE: 89 MMHG | OXYGEN SATURATION: 100 % | RESPIRATION RATE: 18 BRPM | TEMPERATURE: 98 F | HEIGHT: 66 IN | BODY MASS INDEX: 27.64 KG/M2 | HEART RATE: 83 BPM

## 2017-07-12 PROBLEM — R11.2 INTRACTABLE VOMITING WITH NAUSEA: Status: RESOLVED | Noted: 2017-07-10 | Resolved: 2017-07-12

## 2017-07-12 PROCEDURE — 99217 PR OBSERVATION CARE DISCHARGE: CPT | Mod: ,,, | Performed by: INTERNAL MEDICINE

## 2017-07-12 PROCEDURE — 25000003 PHARM REV CODE 250: Performed by: NURSE PRACTITIONER

## 2017-07-12 PROCEDURE — G0378 HOSPITAL OBSERVATION PER HR: HCPCS

## 2017-07-12 PROCEDURE — 25000003 PHARM REV CODE 250: Performed by: HOSPITALIST

## 2017-07-12 RX ORDER — ONDANSETRON 4 MG/1
4 TABLET, ORALLY DISINTEGRATING ORAL EVERY 8 HOURS PRN
Status: DISCONTINUED | OUTPATIENT
Start: 2017-07-12 | End: 2017-07-12 | Stop reason: HOSPADM

## 2017-07-12 RX ADMIN — AMLODIPINE BESYLATE 10 MG: 10 TABLET ORAL at 06:07

## 2017-07-12 RX ADMIN — ONDANSETRON 4 MG: 4 TABLET, ORALLY DISINTEGRATING ORAL at 02:07

## 2017-07-12 RX ADMIN — HEPARIN SODIUM 5000 UNITS: 5000 INJECTION, SOLUTION INTRAVENOUS; SUBCUTANEOUS at 06:07

## 2017-07-12 RX ADMIN — CINACALCET HYDROCHLORIDE 60 MG: 60 TABLET, COATED ORAL at 08:07

## 2017-07-12 RX ADMIN — CARVEDILOL 25 MG: 12.5 TABLET, FILM COATED ORAL at 08:07

## 2017-07-12 RX ADMIN — ATORVASTATIN CALCIUM 40 MG: 20 TABLET, FILM COATED ORAL at 08:07

## 2017-07-12 NOTE — SUBJECTIVE & OBJECTIVE
"Interval History: "Fine. No nausea". No acute overnight events. No nausea today and tolerating diet.    Review of Systems   Constitutional: Negative for chills, fatigue and fever.   HENT: Negative for ear pain, mouth sores, nosebleeds, postnasal drip, rhinorrhea, sinus pressure, sore throat, tinnitus, trouble swallowing and voice change.    Eyes: Negative for photophobia, pain, redness and visual disturbance.   Respiratory: Negative for apnea, cough, chest tightness, shortness of breath and wheezing.    Cardiovascular: Negative for chest pain, palpitations and leg swelling.   Gastrointestinal: Negative for abdominal pain, blood in stool, constipation, diarrhea, nausea and vomiting.   Endocrine: Negative for cold intolerance, heat intolerance, polydipsia and polyuria.   Genitourinary: Negative for decreased urine volume, difficulty urinating, discharge, dysuria, flank pain, frequency, genital sores, hematuria, penile pain, penile swelling, scrotal swelling, testicular pain and urgency.   Musculoskeletal: Negative for arthralgias, back pain, joint swelling, myalgias and neck pain.   Skin: Negative for color change and rash.   Allergic/Immunologic: Negative for environmental allergies and food allergies.   Neurological: Negative for dizziness, seizures, syncope, weakness, light-headedness, numbness and headaches.   Hematological: Negative for adenopathy. Does not bruise/bleed easily.   Psychiatric/Behavioral: Negative for agitation, confusion, decreased concentration, hallucinations, self-injury, sleep disturbance and suicidal ideas. The patient is not nervous/anxious.      Objective:     Vital Signs (Most Recent):  Temp: 98.1 °F (36.7 °C) (07/12/17 0740)  Pulse: 83 (07/12/17 0740)  Resp: 18 (07/12/17 0740)  BP: (!) 155/89 (07/12/17 0740)  SpO2: 100 % (07/12/17 0740) Vital Signs (24h Range):  Temp:  [97.7 °F (36.5 °C)-99.2 °F (37.3 °C)] 98.1 °F (36.7 °C)  Pulse:  [74-87] 83  Resp:  [18] 18  SpO2:  [99 %-100 %] 100 " %  BP: (111-163)/(65-95) 155/89     Weight: 78 kg (172 lb)  Body mass index is 27.76 kg/m².    Intake/Output Summary (Last 24 hours) at 07/12/17 0815  Last data filed at 07/11/17 1115   Gross per 24 hour   Intake              600 ml   Output             4600 ml   Net            -4000 ml      Physical Exam   Constitutional: He is oriented to person, place, and time. He appears well-developed and well-nourished.   HENT:   Head: Normocephalic and atraumatic.   Right Ear: External ear normal.   Left Ear: External ear normal.   Mouth/Throat: Oropharynx is clear and moist.   Eyes: Conjunctivae and EOM are normal. Pupils are equal, round, and reactive to light.   Neck: Normal range of motion. Neck supple. No thyromegaly present.   Cardiovascular: Normal rate, regular rhythm and normal heart sounds.    No murmur heard.  Pulmonary/Chest: Effort normal and breath sounds normal. He has no wheezes. He has no rales.   Abdominal: Soft. Bowel sounds are normal. He exhibits no mass. There is no tenderness. There is no rebound.   Musculoskeletal: Normal range of motion.   Left BKA   Lymphadenopathy:     He has no cervical adenopathy.   Neurological: He is alert and oriented to person, place, and time.   Skin: Skin is warm and dry.   Psychiatric: He has a normal mood and affect. His behavior is normal.   Vitals reviewed.      Significant Labs:   BMP:   Recent Labs  Lab 07/11/17  0828   GLU 93      K 3.6   CL 90*   CO2 28   BUN 55*   CREATININE 11.9*   CALCIUM 9.2     CBC:   Recent Labs  Lab 07/11/17  0828   WBC 6.28   HGB 9.7*   HCT 27.2*          Significant Imaging: I have reviewed all pertinent imaging results/findings within the past 24 hours.

## 2017-07-12 NOTE — PLAN OF CARE
[facility]   Rec'd call from Dr. Suazo requesting GI f/u appt in 3 weeksl    Future Appointments  Date Time Provider Department Center   8/2/2017 8:00 AM Antoinette Yates NP [facility] GASTRO [facility]   9/8/2017 11:15 AM Nalini Scott DPM [facility] P[facility] Leontta called back from the GI Clinic; appt booked as noted above on 8/2 07/12/17 0835   Final Note   Assessment Type Final Discharge Note   Discharge Disposition Home   Discharge planning education complete? Yes   Hospital Follow Up  Appt(s) scheduled? Yes   Discharge plans and expectations educations in teach back method with documentation complete? Yes   Offered [facility] -- Bedside Delivery? n/a   Discharge/Hospital Encounter Summary to ([facility]) PCP n/a   Referral to Outpatient Case Management complete? n/a   Referral to / orders for Home Health Complete? n/a   30 day supply of medicines given at discharge, if documented non-compliance / non-adherence? n/a   Any social issues identified prior to discharge? n/a   Did you assess the readiness or willingness of the family or caregiver to support self management of care? Yes   Right Care Referral Info   Post Acute Recommendation No Care    so msg sent to GI clinic to make this f/u appt with Dr. Galicia or staff.   They will contact Cm if pt still in hospital; if pt discharged, they will contact the pt on his cell phone.

## 2017-07-12 NOTE — PLAN OF CARE
Problem: Patient Care Overview  Goal: Plan of Care Review  Outcome: Ongoing (interventions implemented as appropriate)  VS and assessment performed per orders. nausea medication given as ordered, moderate relief. Plan of care reviewed with pt, all concerns addressed. Pt free from injury or any falls.

## 2017-07-12 NOTE — PROGRESS NOTES
"[facility] Hospital Medicine  Progress Note    Patient Name: Vaughn Retana  MRN: [id number]  Patient Class: OP- Observation   Admission Date: 7/10/2017  Length of Stay: 0 days  Attending Physician: Cecily Suazo MD  Primary Care Provider: Concepcion Landeros MD    [facility] Medicine Team: [facility] [place] Cecily Suazo MD    Subjective:     Principal Problem:Intractable vomiting with nausea    HPI:  Patient is a 52 y.o. male with PMH of ESRD on MWF, HTN, and esophagitis who presents to ED today with nausea and vomiting x 2 weeks.  Of note, pt was admitted 6/24 for an overnight stay for N/V, which resolved with conservative management and pt was discharged home to continue of PPI.  Pt reports he has not been able to tolerate PO intake over the past 2 weeks.  Denies hematemesis, but notes it was brown and clear.  He has still been having regular bowel movements, and denies abdominal pain.     EGD 4/4/17- Grade D reflux esophagitis; recommend repeat EGD in 3 months to check healing.  Denies weight loss, fever, chills.    Hospital Course:  Patient admitted to Hospital Medicine for management of nausea and vomiting. He was NPO and treated with PPI BID. Diagnostic studies consistent with severe esophagitis. His nausea and vomiting resolved and patient tolerated clear liquid diet. His nausea remained resolved and patient was medically stable for discharge.    Interval History: "Fine. No nausea". No acute overnight events. No nausea today and tolerating diet.    Review of Systems   Constitutional: Negative for chills, fatigue and fever.   HENT: Negative for ear pain, mouth sores, nosebleeds, postnasal drip, rhinorrhea, sinus pressure, sore throat, tinnitus, trouble swallowing and voice change.    Eyes: Negative for photophobia, pain, redness and visual disturbance.   Respiratory: Negative for apnea, cough, chest tightness, shortness of breath and wheezing.    Cardiovascular: Negative for " chest pain, palpitations and leg swelling.   Gastrointestinal: Negative for abdominal pain, blood in stool, constipation, diarrhea, nausea and vomiting.   Endocrine: Negative for cold intolerance, heat intolerance, polydipsia and polyuria.   Genitourinary: Negative for decreased urine volume, difficulty urinating, discharge, dysuria, flank pain, frequency, genital sores, hematuria, penile pain, penile swelling, scrotal swelling, testicular pain and urgency.   Musculoskeletal: Negative for arthralgias, back pain, joint swelling, myalgias and neck pain.   Skin: Negative for color change and rash.   Allergic/Immunologic: Negative for environmental allergies and food allergies.   Neurological: Negative for dizziness, seizures, syncope, weakness, light-headedness, numbness and headaches.   Hematological: Negative for adenopathy. Does not bruise/bleed easily.   Psychiatric/Behavioral: Negative for agitation, confusion, decreased concentration, hallucinations, self-injury, sleep disturbance and suicidal ideas. The patient is not nervous/anxious.      Objective:     Vital Signs (Most Recent):  Temp: 98.1 °F (36.7 °C) (07/12/17 0740)  Pulse: 83 (07/12/17 0740)  Resp: 18 (07/12/17 0740)  BP: (!) 155/89 (07/12/17 0740)  SpO2: 100 % (07/12/17 0740) Vital Signs (24h Range):  Temp:  [97.7 °F (36.5 °C)-99.2 °F (37.3 °C)] 98.1 °F (36.7 °C)  Pulse:  [74-87] 83  Resp:  [18] 18  SpO2:  [99 %-100 %] 100 %  BP: (111-163)/(65-95) 155/89     Weight: 78 kg (172 lb)  Body mass index is 27.76 kg/m².    Intake/Output Summary (Last 24 hours) at 07/12/17 0815  Last data filed at 07/11/17 1115   Gross per 24 hour   Intake              600 ml   Output             4600 ml   Net            -4000 ml      Physical Exam   Constitutional: He is oriented to person, place, and time. He appears well-developed and well-nourished.   HENT:   Head: Normocephalic and atraumatic.   Right Ear: External ear normal.   Left Ear: External ear normal.   Mouth/Throat:  Oropharynx is clear and moist.   Eyes: Conjunctivae and EOM are normal. Pupils are equal, round, and reactive to light.   Neck: Normal range of motion. Neck supple. No thyromegaly present.   Cardiovascular: Normal rate, regular rhythm and normal heart sounds.    No murmur heard.  Pulmonary/Chest: Effort normal and breath sounds normal. He has no wheezes. He has no rales.   Abdominal: Soft. Bowel sounds are normal. He exhibits no mass. There is no tenderness. There is no rebound.   Musculoskeletal: Normal range of motion.   Left BKA   Lymphadenopathy:     He has no cervical adenopathy.   Neurological: He is alert and oriented to person, place, and time.   Skin: Skin is warm and dry.   Psychiatric: He has a normal mood and affect. His behavior is normal.   Vitals reviewed.      Significant Labs:   BMP:   Recent Labs  Lab 07/11/17  0828   GLU 93      K 3.6   CL 90*   CO2 28   BUN 55*   CREATININE 11.9*   CALCIUM 9.2     CBC:   Recent Labs  Lab 07/11/17  0828   WBC 6.28   HGB 9.7*   HCT 27.2*          Significant Imaging: I have reviewed all pertinent imaging results/findings within the past 24 hours.    Assessment/Plan:      Renovascular hypertension    Well controlled.  1. Continue Coreg, Norvasc, lisinopril.          ESRD on hemodialysis    Chronic.  1. Intermittent HD per nephrology service.          Dyslipidemia    Chronic and stable.  1. Continue atorvastatin.          Anemia in chr kidney dis    Chronic and without indication for RBC transfusion.  1. Monitor.          * Intractable vomiting with nausea    Likely due to severe esophagitis. No indication for invasive GI procedures. Now resolved and tolerating diet.  1. Omeprazole BID as previously ordered to resume upon discharge.  2. Outpatient follow up with GI service.  3. Encouraged discontinuation of chronic opioid therapy.  4. Medically stable for discharge.          VTE Risk Mitigation         Ordered     High Risk of VTE  Once      07/10/17  1419     Place sequential compression device  Until discontinued      07/10/17 1419     heparin (porcine) injection 5,000 Units  Every 8 hours     Route:  Subcutaneous        07/10/17 1028          Cecily Suazo MD  Department of Hospital Medicine   [facility]

## 2017-07-12 NOTE — ASSESSMENT & PLAN NOTE
Likely due to severe esophagitis. No indication for invasive GI procedures. Now resolved and tolerating diet.  1. Omeprazole BID as previously ordered to resume upon discharge.  2. Outpatient follow up with GI service.  3. Encouraged discontinuation of chronic opioid therapy.  4. Medically stable for discharge.

## 2017-07-12 NOTE — HOSPITAL COURSE
Patient admitted to Hospital Medicine for management of nausea and vomiting. He was NPO and treated with PPI BID. Diagnostic studies consistent with severe esophagitis. His nausea and vomiting resolved and patient tolerated clear liquid diet. His nausea remained resolved and patient was medically stable for discharge.

## 2017-07-12 NOTE — ASSESSMENT & PLAN NOTE
Nausea and vomiting appear to have resolved after initiation of IV PPI. Likely due to severe esophagitis. No indication for invasive GI procedures.  1. PPI BID.  2. Outpatient follow up with GI service.  3. Encouraged discontinuation of chronic opioid therapy.

## 2017-07-12 NOTE — SUBJECTIVE & OBJECTIVE
"Interval History: "No nausea. Just give me a regular meal". No acute overnight events.     Review of Systems   Constitutional: Negative for chills, fatigue and fever.   HENT: Negative for ear pain, mouth sores, nosebleeds, postnasal drip, rhinorrhea, sinus pressure, sore throat, tinnitus, trouble swallowing and voice change.    Eyes: Negative for photophobia, pain, redness and visual disturbance.   Respiratory: Negative for apnea, cough, chest tightness, shortness of breath and wheezing.    Cardiovascular: Negative for chest pain, palpitations and leg swelling.   Gastrointestinal: Negative for abdominal pain, blood in stool, constipation, diarrhea, nausea and vomiting.   Endocrine: Negative for cold intolerance, heat intolerance, polydipsia and polyuria.   Genitourinary: Negative for decreased urine volume, difficulty urinating, discharge, dysuria, flank pain, frequency, genital sores, hematuria, penile pain, penile swelling, scrotal swelling, testicular pain and urgency.   Musculoskeletal: Negative for arthralgias, back pain, joint swelling, myalgias and neck pain.   Skin: Negative for color change and rash.   Allergic/Immunologic: Negative for environmental allergies and food allergies.   Neurological: Negative for dizziness, seizures, syncope, weakness, light-headedness, numbness and headaches.   Hematological: Negative for adenopathy. Does not bruise/bleed easily.   Psychiatric/Behavioral: Negative for agitation, confusion, decreased concentration, hallucinations, self-injury, sleep disturbance and suicidal ideas. The patient is not nervous/anxious.      Objective:     Vital Signs (Most Recent):  Temp: 97.7 °F (36.5 °C) (07/11/17 1535)  Pulse: 78 (07/11/17 1535)  Resp: 18 (07/11/17 1535)  BP: 136/81 (07/11/17 1535)  SpO2: 100 % (07/11/17 1535) Vital Signs (24h Range):  Temp:  [97.7 °F (36.5 °C)-98.4 °F (36.9 °C)] 97.7 °F (36.5 °C)  Pulse:  [74-83] 78  Resp:  [18-20] 18  SpO2:  [98 %-100 %] 100 %  BP: " (111-152)/(65-90) 136/81     Weight: 78 kg (172 lb)  Body mass index is 27.76 kg/m².    Intake/Output Summary (Last 24 hours) at 07/11/17 2111  Last data filed at 07/11/17 1115   Gross per 24 hour   Intake              600 ml   Output             4600 ml   Net            -4000 ml      Physical Exam   Constitutional: He is oriented to person, place, and time. He appears well-developed and well-nourished.   HENT:   Head: Normocephalic and atraumatic.   Right Ear: External ear normal.   Left Ear: External ear normal.   Mouth/Throat: Oropharynx is clear and moist.   Eyes: Conjunctivae and EOM are normal. Pupils are equal, round, and reactive to light.   Neck: Normal range of motion. Neck supple. No thyromegaly present.   Cardiovascular: Normal rate, regular rhythm and normal heart sounds.    No murmur heard.  Pulmonary/Chest: Effort normal and breath sounds normal. He has no wheezes. He has no rales.   Abdominal: Soft. Bowel sounds are normal. He exhibits no mass. There is no tenderness. There is no rebound.   Musculoskeletal: Normal range of motion.   Lymphadenopathy:     He has no cervical adenopathy.   Neurological: He is alert and oriented to person, place, and time.   Skin: Skin is warm and dry.   Psychiatric: He has a normal mood and affect. His behavior is normal.   Vitals reviewed.      Significant Labs:   BMP:   Recent Labs  Lab 07/10/17  0659 07/11/17  0828   * 93    136   K 3.6 3.6   CL 88* 90*   CO2 36* 28   BUN 52* 55*   CREATININE 11.0* 11.9*   CALCIUM 11.0* 9.2   MG 2.3  --      CBC:   Recent Labs  Lab 07/10/17  0659 07/11/17  0828   WBC 8.37 6.28   HGB 11.6* 9.7*   HCT 33.6* 27.2*    189       Significant Imaging: I have reviewed all pertinent imaging results/findings within the past 24 hours.

## 2017-07-12 NOTE — PROGRESS NOTES
"[facility] Hospital Medicine  Progress Note    Patient Name: Vaughn Retana  MRN: [id number]  Patient Class: OP- Observation   Admission Date: 7/10/2017  Length of Stay: 0 days  Attending Physician: Cecily Suazo MD  Primary Care Provider: Concepcion Landeros MD    [facility] Medicine Team: [facility] [place] MED L Cecily Suazo MD    Subjective:     Principal Problem:Intractable vomiting with nausea    HPI:  Patient is a 52 y.o. male with PMH of ESRD on MWF, HTN, and esophagitis who presents to ED today with nausea and vomiting x 2 weeks.  Of note, pt was admitted 6/24 for an overnight stay for N/V, which resolved with conservative management and pt was discharged home to continue of PPI.  Pt reports he has not been able to tolerate PO intake over the past 2 weeks.  Denies hematemesis, but notes it was brown and clear.  He has still been having regular bowel movements, and denies abdominal pain.     EGD 4/4/17- Grade D reflux esophagitis; recommend repeat EGD in 3 months to check healing.  Denies weight loss, fever, chills.    Hospital Course:  Patient admitted to Hospital Medicine for management of nausea and vomiting. He was NPO and treated with PPI BID. Diagnostic studies consistent with severe esophagitis. His nausea and vomiting resolved and patient tolerated clear liquid diet.     Interval History: "No nausea. Just give me a regular meal". No acute overnight events.     Review of Systems   Constitutional: Negative for chills, fatigue and fever.   HENT: Negative for ear pain, mouth sores, nosebleeds, postnasal drip, rhinorrhea, sinus pressure, sore throat, tinnitus, trouble swallowing and voice change.    Eyes: Negative for photophobia, pain, redness and visual disturbance.   Respiratory: Negative for apnea, cough, chest tightness, shortness of breath and wheezing.    Cardiovascular: Negative for chest pain, palpitations and leg swelling.   Gastrointestinal: Negative for abdominal pain, " blood in stool, constipation, diarrhea, nausea and vomiting.   Endocrine: Negative for cold intolerance, heat intolerance, polydipsia and polyuria.   Genitourinary: Negative for decreased urine volume, difficulty urinating, discharge, dysuria, flank pain, frequency, genital sores, hematuria, penile pain, penile swelling, scrotal swelling, testicular pain and urgency.   Musculoskeletal: Negative for arthralgias, back pain, joint swelling, myalgias and neck pain.   Skin: Negative for color change and rash.   Allergic/Immunologic: Negative for environmental allergies and food allergies.   Neurological: Negative for dizziness, seizures, syncope, weakness, light-headedness, numbness and headaches.   Hematological: Negative for adenopathy. Does not bruise/bleed easily.   Psychiatric/Behavioral: Negative for agitation, confusion, decreased concentration, hallucinations, self-injury, sleep disturbance and suicidal ideas. The patient is not nervous/anxious.      Objective:     Vital Signs (Most Recent):  Temp: 97.7 °F (36.5 °C) (07/11/17 1535)  Pulse: 78 (07/11/17 1535)  Resp: 18 (07/11/17 1535)  BP: 136/81 (07/11/17 1535)  SpO2: 100 % (07/11/17 1535) Vital Signs (24h Range):  Temp:  [97.7 °F (36.5 °C)-98.4 °F (36.9 °C)] 97.7 °F (36.5 °C)  Pulse:  [74-83] 78  Resp:  [18-20] 18  SpO2:  [98 %-100 %] 100 %  BP: (111-152)/(65-90) 136/81     Weight: 78 kg (172 lb)  Body mass index is 27.76 kg/m².    Intake/Output Summary (Last 24 hours) at 07/11/17 2111  Last data filed at 07/11/17 1115   Gross per 24 hour   Intake              600 ml   Output             4600 ml   Net            -4000 ml      Physical Exam   Constitutional: He is oriented to person, place, and time. He appears well-developed and well-nourished.   HENT:   Head: Normocephalic and atraumatic.   Right Ear: External ear normal.   Left Ear: External ear normal.   Mouth/Throat: Oropharynx is clear and moist.   Eyes: Conjunctivae and EOM are normal. Pupils are equal,  round, and reactive to light.   Neck: Normal range of motion. Neck supple. No thyromegaly present.   Cardiovascular: Normal rate, regular rhythm and normal heart sounds.    No murmur heard.  Pulmonary/Chest: Effort normal and breath sounds normal. He has no wheezes. He has no rales.   Abdominal: Soft. Bowel sounds are normal. He exhibits no mass. There is no tenderness. There is no rebound.   Musculoskeletal: Normal range of motion.   Lymphadenopathy:     He has no cervical adenopathy.   Neurological: He is alert and oriented to person, place, and time.   Skin: Skin is warm and dry.   Psychiatric: He has a normal mood and affect. His behavior is normal.   Vitals reviewed.      Significant Labs:   BMP:   Recent Labs  Lab 07/10/17  0659 07/11/17  0828   * 93    136   K 3.6 3.6   CL 88* 90*   CO2 36* 28   BUN 52* 55*   CREATININE 11.0* 11.9*   CALCIUM 11.0* 9.2   MG 2.3  --      CBC:   Recent Labs  Lab 07/10/17  0659 07/11/17  0828   WBC 8.37 6.28   HGB 11.6* 9.7*   HCT 33.6* 27.2*    189       Significant Imaging: I have reviewed all pertinent imaging results/findings within the past 24 hours.    Assessment/Plan:      Renovascular hypertension    Well controlled.  1. Continue Coreg, Norvasc, lisinopril.          ESRD on hemodialysis    Chronic.  1. Intermittent HD per nephrology service.          Dyslipidemia    Chronic and stable.  1. Continue atorvastatin.          Anemia in chr kidney dis    Chronic and without indication for RBC transfusion.  1. Monitor.          * Intractable vomiting with nausea    Nausea and vomiting appear to have resolved after initiation of IV PPI. Likely due to severe esophagitis. No indication for invasive GI procedures.  1. PPI BID.  2. Outpatient follow up with GI service.  3. Encouraged discontinuation of chronic opioid therapy.          VTE Risk Mitigation         Ordered     High Risk of VTE  Once      07/10/17 1419     Place sequential compression device  Until  discontinued      07/10/17 1419     heparin (porcine) injection 5,000 Units  Every 8 hours     Route:  Subcutaneous        07/10/17 1028          Cecily Suazo MD  Department of Hospital Medicine   [facility]

## 2017-07-12 NOTE — DISCHARGE SUMMARY
[facility] Hospital Medicine  Discharge Summary      Patient Name: Vauhgn Retana  MRN: [id number]  Admission Date: 7/10/2017  Hospital Length of Stay: 0 days  Discharge Date and Time: 7/12/2017 10:11 AM  Attending Physician: Cecily Suazo MD   Discharging Provider: Cecily Suazo MD  Primary Care Provider: Concepcion Landeros MD    [facility] Medicine Team: [facility]  Cecily Suazo MD    HPI: Patient is a 52 y.o. male with PMH of ESRD on MWF, HTN, and esophagitis who presents to ED today with nausea and vomiting x 2 weeks.  Of note, pt was admitted 6/24 for an overnight stay for N/V, which resolved with conservative management and pt was discharged home to continue of PPI.  Pt reports he has not been able to tolerate PO intake over the past 2 weeks.  Denies hematemesis, but notes it was brown and clear.  He has still been having regular bowel movements, and denies abdominal pain.     EGD 4/4/17- Grade D reflux esophagitis; recommend repeat EGD in 3 months to check healing.  Denies weight loss, fever, chills.    * No surgery found *      Indwelling Lines/Drains at time of discharge:   Lines/Drains/Airways     Drain                 Hemodialysis AV Graft 01/22/11 1828 Right upper arm 2362 days              Hospital Course: Patient admitted to Hospital Medicine for management of nausea and vomiting. He was NPO and treated with PPI BID. Diagnostic studies consistent with severe esophagitis. His nausea and vomiting resolved and patient tolerated clear liquid diet. His nausea remained resolved and patient was medically stable for discharge.    Consults:   Consults         Status Ordering Provider     Inpatient consult to Gastroenterology  Once     Provider:  (Not yet assigned)    Completed ALISIA MAYS     Inpatient consult to Nephrology  Once     Provider:  (Not yet assigned)    Completed JOLANTA MCGRATH          Significant Diagnostic Studies: Labs:   BMP:   Recent  Labs  Lab 07/11/17  0828   GLU 93      K 3.6   CL 90*   CO2 28   BUN 55*   CREATININE 11.9*   CALCIUM 9.2    and CBC   Recent Labs  Lab 07/11/17  0828   WBC 6.28   HGB 9.7*   HCT 27.2*          Pending Diagnostic Studies:     None        Final Active Diagnoses:    Diagnosis Date Noted POA    PRINCIPAL PROBLEM:  Intractable vomiting with nausea [R11.2] 07/10/2017 Yes    Renovascular hypertension [I15.0] 07/10/2017 Yes    History of left below knee amputation 12/18/13 [Z89.512] 12/19/2013 Not Applicable     Chronic    ESRD on hemodialysis [N18.6, Z99.2] 02/07/2013 Not Applicable     Chronic    Dyslipidemia [E78.5] 02/07/2013 Yes     Chronic    Anemia in chr kidney dis [N18.9, D63.1] 09/17/2012 Yes     Chronic      Problems Resolved During this Admission:    Diagnosis Date Noted Date Resolved POA      Discharged Condition: good    Disposition: Home or Self Care    Follow Up:  Follow-up Information     [facility].    Specialty:  Gastroenterology  Contact information:  [address]  [facility] [address] [phone]  Additional information:  [facility] [address]               Patient Instructions:     Diet renal     Activity as tolerated       Medications:  Reconciled Home Medications:   Current Discharge Medication List      CONTINUE these medications which have NOT CHANGED    Details   acetaminophen (TYLENOL) 500 MG tablet Take 1 tablet (500 mg total) by mouth every 6 (six) hours as needed for Pain.  Refills: 0      amlodipine (NORVASC) 10 MG tablet Take 1 tablet (10 mg total) by mouth every morning.  Qty: 90 tablet, Refills: 4    Associated Diagnoses: Malignant hypertension with heart failure and end-stage renal dis      atorvastatin (LIPITOR) 40 MG tablet Take 1 tablet (40 mg total) by mouth once daily.  Qty: 90 tablet, Refills: 4    Associated Diagnoses: Peripheral vascular disease in diabetes mellitus      calcium carbonate (OS-FERNANDA) 500 mg calcium (1,250 mg)  chewable tablet Take 1 tablet (500 mg total) by mouth 3 (three) times daily with meals.    Associated Diagnoses: ESRD on hemodialysis      carvedilol (COREG) 25 MG tablet Take 1 tablet (25 mg total) by mouth 2 (two) times daily with meals.  Qty: 180 tablet, Refills: 4    Associated Diagnoses: Malignant hypertension with heart failure and end-stage renal dis      chlorproMAZINE (THORAZINE) 25 MG tablet Take 1 tablet (25 mg total) by mouth 3 (three) times daily as needed (Hiccups).  Qty: 90 tablet, Refills: 11    Associated Diagnoses: Hiccups      cinacalcet (SENSIPAR) 60 MG Tab Take 1 tablet (60 mg total) by mouth daily with breakfast.  Qty: 30 tablet, Refills: 3      hydrALAZINE (APRESOLINE) 50 MG tablet Take 1 tablet (50 mg total) by mouth every 8 (eight) hours as needed (systolic blood pressure (top number) > 180).  Qty: 90 tablet, Refills: 4    Associated Diagnoses: Malignant hypertension with heart failure and end-stage renal dis      lisinopril (PRINIVIL,ZESTRIL) 40 MG tablet Take 1 tablet (40 mg total) by mouth every evening.  Qty: 90 tablet, Refills: 4    Associated Diagnoses: Malignant hypertension with heart failure and end-stage renal dis      omeprazole (PRILOSEC) 40 MG capsule Take 1 capsule (40 mg total) by mouth 2 (two) times daily before meals.  Qty: 90 capsule, Refills: 3      ondansetron (ZOFRAN) 8 MG tablet Take 1 tablet (8 mg total) by mouth every 8 (eight) hours as needed for Nausea.  Qty: 90 tablet, Refills: 4    Associated Diagnoses: Hiccups      ondansetron (ZOFRAN-ODT) 8 MG TbDL Take 1 tablet (8 mg total) by mouth every 8 (eight) hours as needed.  Qty: 10 tablet, Refills: 0           Time spent on the discharge of patient: 30 minutes    Cecily Suazo MD  Department of Hospital Medicine  [facility]

## 2017-07-12 NOTE — TELEPHONE ENCOUNTER
Returned Shelia/case management call. Patient scheduled for 3 week follow up in GI clinic. Appointment scheduled and mailed to address on file.

## 2017-07-12 NOTE — PROGRESS NOTES
Patient is being discharged from the hospital, patient received discharge instructions, discussed patient follow up appointments, taking medication as prescribed.  Patient verbalized understanding and acknowledged the discharge information that was given.  Assisted the patient with arranging transportation for discharge.

## 2017-07-12 NOTE — PLAN OF CARE
Problem: Diabetes, Type 2 (Adult)  Goal: Signs and Symptoms of Listed Potential Problems Will be Absent, Minimized or Managed (Diabetes, Type 2)  Signs and symptoms of listed potential problems will be absent, minimized or managed by discharge/transition of care (reference Diabetes, Type 2 (Adult) CPG).   Outcome: Ongoing (interventions implemented as appropriate)  Patient being discharged home.    Problem: Fall Risk (Adult)  Goal: Identify Related Risk Factors and Signs and Symptoms  Related risk factors and signs and symptoms are identified upon initiation of Human Response Clinical Practice Guideline (CPG)   Outcome: Ongoing (interventions implemented as appropriate)  Patient ambulates with his prosthetic leg, he is being discharged home, patient has not had a fall.    Problem: Hemodialysis (Adult)  Goal: Signs and Symptoms of Listed Potential Problems Will be Absent, Minimized or Managed (Hemodialysis)  Signs and symptoms of listed potential problems will be absent, minimized or managed by discharge/transition of care (reference Hemodialysis (Adult) CPG).   Outcome: Ongoing (interventions implemented as appropriate)  Patient receives hemodialysis on Monday, Wednesday, and Friday, he is being discharged home.

## 2017-07-12 NOTE — HPI
Patient is a 52 y.o. male with PMH of ESRD on MWF, HTN, and esophagitis who presents to ED today with nausea and vomiting x 2 weeks.  Of note, pt was admitted 6/24 for an overnight stay for N/V, which resolved with conservative management and pt was discharged home to continue of PPI.  Pt reports he has not been able to tolerate PO intake over the past 2 weeks.  Denies hematemesis, but notes it was brown and clear.  He has still been having regular bowel movements, and denies abdominal pain.     EGD 4/4/17- Grade D reflux esophagitis; recommend repeat EGD in 3 months to check healing.  Denies weight loss, fever, chills.

## 2017-07-12 NOTE — PROGRESS NOTES
Unable to give IV nausea meds at this time due to loss of IV.  Unable to successfully start new one with several attempts by different nurses. Lucho kelly notified. To order PO zofran for now. Pt requesting next IV start attempt to be done later in the morning. Charge nurse made aware.

## 2017-07-12 NOTE — TELEPHONE ENCOUNTER
----- Message from Leilani Valle sent at 7/12/2017  8:34 AM CDT -----  Contact: Shelia- Case Sacha- [phone]  [place]- Shelia called to schedule a 3 week f/u appt for the pt with Dr. Galicia- pt saw him in the hospital- was seen for esophagitis- please call Shelia back at ext. [phone]

## 2017-07-17 ENCOUNTER — PATIENT OUTREACH (OUTPATIENT)
Dept: ADMINISTRATIVE | Facility: HOSPITAL | Age: 52
End: 2017-07-17

## 2017-07-17 NOTE — PROGRESS NOTES
Spoke with pt regarding establishing care with Dr. Landeros. He says he is seeing another PCP currently and does not plan to see Dr. Landeros. Care team updated.

## 2017-07-24 ENCOUNTER — HOSPITAL ENCOUNTER (INPATIENT)
Facility: HOSPITAL | Age: 52
LOS: 4 days | Discharge: NURSING, REHAB OR LONG-TERM CARE | DRG: 064 | End: 2017-07-28
Attending: EMERGENCY MEDICINE | Admitting: EMERGENCY MEDICINE
Payer: MEDICARE

## 2017-07-24 DIAGNOSIS — I10 HTN (HYPERTENSION): ICD-10-CM

## 2017-07-24 DIAGNOSIS — Z99.2 ESRD (END STAGE RENAL DISEASE) ON DIALYSIS: ICD-10-CM

## 2017-07-24 DIAGNOSIS — I13.2 MALIGNANT HYPERTENSION WITH HEART FAILURE AND END-STAGE RENAL DIS: Chronic | ICD-10-CM

## 2017-07-24 DIAGNOSIS — N18.6 MALIGNANT HYPERTENSION WITH HEART FAILURE AND END-STAGE RENAL DIS: Chronic | ICD-10-CM

## 2017-07-24 DIAGNOSIS — R41.82 ALTERED MENTAL STATE: ICD-10-CM

## 2017-07-24 DIAGNOSIS — N18.6 ESRD (END STAGE RENAL DISEASE) ON DIALYSIS: ICD-10-CM

## 2017-07-24 DIAGNOSIS — G93.6 VASOGENIC CEREBRAL EDEMA: ICD-10-CM

## 2017-07-24 DIAGNOSIS — I61.9 NONTRAUMATIC INTRACEREBRAL HEMORRHAGE, UNSPECIFIED: ICD-10-CM

## 2017-07-24 DIAGNOSIS — Z99.2 ESRD ON HEMODIALYSIS: Chronic | ICD-10-CM

## 2017-07-24 DIAGNOSIS — N18.6 ESRD ON HEMODIALYSIS: Chronic | ICD-10-CM

## 2017-07-24 DIAGNOSIS — I82.409 DVT (DEEP VENOUS THROMBOSIS): ICD-10-CM

## 2017-07-24 DIAGNOSIS — I61.9 INTRAPARENCHYMAL HEMORRHAGE OF BRAIN: Primary | ICD-10-CM

## 2017-07-24 DIAGNOSIS — K59.00 CONSTIPATION, UNSPECIFIED CONSTIPATION TYPE: ICD-10-CM

## 2017-07-24 LAB
ABO + RH BLD: NORMAL
ABO + RH BLD: NORMAL
ALBUMIN SERPL BCP-MCNC: 3.3 G/DL
ALLENS TEST: ABNORMAL
ALLENS TEST: ABNORMAL
ANION GAP SERPL CALC-SCNC: 17 MMOL/L
ANION GAP SERPL CALC-SCNC: 22 MMOL/L
APTT BLDCRRT: 24.5 SEC
BASOPHILS # BLD AUTO: 0.01 K/UL
BASOPHILS NFR BLD: 0.1 %
BLD GP AB SCN CELLS X3 SERPL QL: NORMAL
BLD GP AB SCN CELLS X3 SERPL QL: NORMAL
BUN SERPL-MCNC: 37 MG/DL
BUN SERPL-MCNC: 46 MG/DL
CALCIUM SERPL-MCNC: 10.7 MG/DL
CALCIUM SERPL-MCNC: 11.7 MG/DL
CHLORIDE SERPL-SCNC: 79 MMOL/L
CHLORIDE SERPL-SCNC: 82 MMOL/L
CHOLEST/HDLC SERPL: 4.1 {RATIO}
CO2 SERPL-SCNC: 40 MMOL/L
CO2 SERPL-SCNC: 42 MMOL/L
CREAT SERPL-MCNC: 11.7 MG/DL
CREAT SERPL-MCNC: 11.8 MG/DL
DELSYS: ABNORMAL
DELSYS: ABNORMAL
DIASTOLIC DYSFUNCTION: NO
DIFFERENTIAL METHOD: ABNORMAL
EOSINOPHIL # BLD AUTO: 0.2 K/UL
EOSINOPHIL NFR BLD: 2 %
ERYTHROCYTE [DISTWIDTH] IN BLOOD BY AUTOMATED COUNT: 13.4 %
ERYTHROCYTE [SEDIMENTATION RATE] IN BLOOD BY WESTERGREN METHOD: 18 MM/H
EST. GFR  (AFRICAN AMERICAN): 5 ML/MIN/1.73 M^2
EST. GFR  (AFRICAN AMERICAN): 5 ML/MIN/1.73 M^2
EST. GFR  (NON AFRICAN AMERICAN): 4 ML/MIN/1.73 M^2
EST. GFR  (NON AFRICAN AMERICAN): 4.3 ML/MIN/1.73 M^2
ESTIMATED AVG GLUCOSE: 97 MG/DL
FIO2: 21
GLUCOSE SERPL-MCNC: 130 MG/DL
GLUCOSE SERPL-MCNC: 134 MG/DL
HBA1C MFR BLD HPLC: 5 %
HCO3 UR-SCNC: 48.3 MMOL/L (ref 24–28)
HCO3 UR-SCNC: 51.6 MMOL/L (ref 24–28)
HCT VFR BLD AUTO: 37.4 %
HDL/CHOLESTEROL RATIO: 24.6 %
HDLC SERPL-MCNC: 195 MG/DL
HDLC SERPL-MCNC: 48 MG/DL
HGB BLD-MCNC: 12.7 G/DL
INR PPP: 1.1
LDLC SERPL CALC-MCNC: 123.8 MG/DL
LYMPHOCYTES # BLD AUTO: 1.5 K/UL
LYMPHOCYTES NFR BLD: 17.7 %
MCH RBC QN AUTO: 32.7 PG
MCHC RBC AUTO-ENTMCNC: 34 G/DL
MCV RBC AUTO: 96 FL
MODE: ABNORMAL
MODE: ABNORMAL
MONOCYTES # BLD AUTO: 0.8 K/UL
MONOCYTES NFR BLD: 9.7 %
NEUTROPHILS # BLD AUTO: 6.1 K/UL
NEUTROPHILS NFR BLD: 70.3 %
NONHDLC SERPL-MCNC: 147 MG/DL
PCO2 BLDA: 59 MMHG (ref 35–45)
PCO2 BLDA: 63.6 MMHG (ref 35–45)
PH SMN: 7.52 [PH] (ref 7.35–7.45)
PH SMN: 7.52 [PH] (ref 7.35–7.45)
PHOSPHATE SERPL-MCNC: 3 MG/DL
PLATELET # BLD AUTO: 226 K/UL
PMV BLD AUTO: 10.9 FL
PO2 BLDA: 38 MMHG (ref 40–60)
PO2 BLDA: 51 MMHG (ref 80–100)
POC BE: 25 MMOL/L
POC BE: 29 MMOL/L
POC SATURATED O2: 75 % (ref 95–100)
POC SATURATED O2: 88 % (ref 95–100)
POC TCO2: >50 MMOL/L (ref 23–27)
POCT GLUCOSE: 123 MG/DL (ref 70–110)
POCT GLUCOSE: 135 MG/DL (ref 70–110)
POTASSIUM SERPL-SCNC: 3.3 MMOL/L
POTASSIUM SERPL-SCNC: 5.2 MMOL/L
PROTHROMBIN TIME: 11.4 SEC
RBC # BLD AUTO: 3.88 M/UL
RETIRED EF AND QEF - SEE NOTES: 65 (ref 55–65)
SAMPLE: ABNORMAL
SAMPLE: ABNORMAL
SITE: ABNORMAL
SITE: ABNORMAL
SODIUM SERPL-SCNC: 141 MMOL/L
SODIUM SERPL-SCNC: 141 MMOL/L
SP02: 98
SP02: 99
TRIGL SERPL-MCNC: 116 MG/DL
TSH SERPL DL<=0.005 MIU/L-ACNC: 1.27 UIU/ML
WBC # BLD AUTO: 8.65 K/UL

## 2017-07-24 PROCEDURE — 25000003 PHARM REV CODE 250: Performed by: EMERGENCY MEDICINE

## 2017-07-24 PROCEDURE — 93005 ELECTROCARDIOGRAM TRACING: CPT

## 2017-07-24 PROCEDURE — 82803 BLOOD GASES ANY COMBINATION: CPT

## 2017-07-24 PROCEDURE — 93010 ELECTROCARDIOGRAM REPORT: CPT | Mod: ,,, | Performed by: INTERNAL MEDICINE

## 2017-07-24 PROCEDURE — 36600 WITHDRAWAL OF ARTERIAL BLOOD: CPT

## 2017-07-24 PROCEDURE — 25000003 PHARM REV CODE 250: Performed by: NURSE PRACTITIONER

## 2017-07-24 PROCEDURE — 96365 THER/PROPH/DIAG IV INF INIT: CPT

## 2017-07-24 PROCEDURE — 96376 TX/PRO/DX INJ SAME DRUG ADON: CPT

## 2017-07-24 PROCEDURE — 93306 TTE W/DOPPLER COMPLETE: CPT | Mod: 26,,, | Performed by: INTERNAL MEDICINE

## 2017-07-24 PROCEDURE — 99291 CRITICAL CARE FIRST HOUR: CPT | Mod: 25

## 2017-07-24 PROCEDURE — 93306 TTE W/DOPPLER COMPLETE: CPT

## 2017-07-24 PROCEDURE — 97165 OT EVAL LOW COMPLEX 30 MIN: CPT

## 2017-07-24 PROCEDURE — 96361 HYDRATE IV INFUSION ADD-ON: CPT

## 2017-07-24 PROCEDURE — 99291 CRITICAL CARE FIRST HOUR: CPT | Mod: GC,,, | Performed by: EMERGENCY MEDICINE

## 2017-07-24 PROCEDURE — 86850 RBC ANTIBODY SCREEN: CPT

## 2017-07-24 PROCEDURE — 85730 THROMBOPLASTIN TIME PARTIAL: CPT

## 2017-07-24 PROCEDURE — 96375 TX/PRO/DX INJ NEW DRUG ADDON: CPT

## 2017-07-24 PROCEDURE — 80048 BASIC METABOLIC PNL TOTAL CA: CPT

## 2017-07-24 PROCEDURE — 85610 PROTHROMBIN TIME: CPT

## 2017-07-24 PROCEDURE — 86900 BLOOD TYPING SEROLOGIC ABO: CPT | Mod: 91

## 2017-07-24 PROCEDURE — 96366 THER/PROPH/DIAG IV INF ADDON: CPT

## 2017-07-24 PROCEDURE — 80061 LIPID PANEL: CPT

## 2017-07-24 PROCEDURE — 86900 BLOOD TYPING SEROLOGIC ABO: CPT

## 2017-07-24 PROCEDURE — A4216 STERILE WATER/SALINE, 10 ML: HCPCS | Performed by: NURSE PRACTITIONER

## 2017-07-24 PROCEDURE — 99223 1ST HOSP IP/OBS HIGH 75: CPT | Mod: ,,, | Performed by: NEUROLOGICAL SURGERY

## 2017-07-24 PROCEDURE — 20000000 HC ICU ROOM

## 2017-07-24 PROCEDURE — 85025 COMPLETE CBC W/AUTO DIFF WBC: CPT

## 2017-07-24 PROCEDURE — 82962 GLUCOSE BLOOD TEST: CPT

## 2017-07-24 PROCEDURE — 80100014 HC HEMODIALYSIS 1:1

## 2017-07-24 PROCEDURE — 83036 HEMOGLOBIN GLYCOSYLATED A1C: CPT

## 2017-07-24 PROCEDURE — 63600175 PHARM REV CODE 636 W HCPCS: Performed by: NURSE PRACTITIONER

## 2017-07-24 PROCEDURE — 25000003 PHARM REV CODE 250: Performed by: INTERNAL MEDICINE

## 2017-07-24 PROCEDURE — G8987 SELF CARE CURRENT STATUS: HCPCS | Mod: CN

## 2017-07-24 PROCEDURE — 86901 BLOOD TYPING SEROLOGIC RH(D): CPT | Mod: 91

## 2017-07-24 PROCEDURE — 63600175 PHARM REV CODE 636 W HCPCS: Performed by: EMERGENCY MEDICINE

## 2017-07-24 PROCEDURE — 99233 SBSQ HOSP IP/OBS HIGH 50: CPT | Mod: ,,, | Performed by: PSYCHIATRY & NEUROLOGY

## 2017-07-24 PROCEDURE — 80069 RENAL FUNCTION PANEL: CPT

## 2017-07-24 PROCEDURE — G8988 SELF CARE GOAL STATUS: HCPCS | Mod: CL

## 2017-07-24 PROCEDURE — 99291 CRITICAL CARE FIRST HOUR: CPT | Mod: ,,, | Performed by: NURSE PRACTITIONER

## 2017-07-24 PROCEDURE — 99223 1ST HOSP IP/OBS HIGH 75: CPT | Mod: GC,,, | Performed by: INTERNAL MEDICINE

## 2017-07-24 PROCEDURE — 84443 ASSAY THYROID STIM HORMONE: CPT

## 2017-07-24 RX ORDER — SODIUM CHLORIDE 9 MG/ML
INJECTION, SOLUTION INTRAVENOUS ONCE
Status: COMPLETED | OUTPATIENT
Start: 2017-07-24 | End: 2017-07-24

## 2017-07-24 RX ORDER — POTASSIUM CHLORIDE 7.45 MG/ML
10 INJECTION INTRAVENOUS
Status: DISCONTINUED | OUTPATIENT
Start: 2017-07-24 | End: 2017-07-24

## 2017-07-24 RX ORDER — ONDANSETRON 2 MG/ML
4 INJECTION INTRAMUSCULAR; INTRAVENOUS ONCE
Status: COMPLETED | OUTPATIENT
Start: 2017-07-24 | End: 2017-07-24

## 2017-07-24 RX ORDER — CINACALCET 60 MG/1
60 TABLET, FILM COATED ORAL
Status: DISCONTINUED | OUTPATIENT
Start: 2017-07-25 | End: 2017-07-28 | Stop reason: HOSPADM

## 2017-07-24 RX ORDER — LISINOPRIL 20 MG/1
40 TABLET ORAL NIGHTLY
Status: DISCONTINUED | OUTPATIENT
Start: 2017-07-24 | End: 2017-07-28 | Stop reason: HOSPADM

## 2017-07-24 RX ORDER — HYDRALAZINE HYDROCHLORIDE 20 MG/ML
10 INJECTION INTRAMUSCULAR; INTRAVENOUS EVERY 4 HOURS PRN
Status: DISCONTINUED | OUTPATIENT
Start: 2017-07-24 | End: 2017-07-28 | Stop reason: HOSPADM

## 2017-07-24 RX ORDER — GLUCAGON 1 MG
1 KIT INJECTION
Status: DISCONTINUED | OUTPATIENT
Start: 2017-07-24 | End: 2017-07-28 | Stop reason: HOSPADM

## 2017-07-24 RX ORDER — IBUPROFEN 200 MG
16 TABLET ORAL
Status: DISCONTINUED | OUTPATIENT
Start: 2017-07-24 | End: 2017-07-28 | Stop reason: HOSPADM

## 2017-07-24 RX ORDER — ONDANSETRON 2 MG/ML
4 INJECTION INTRAMUSCULAR; INTRAVENOUS
Status: COMPLETED | OUTPATIENT
Start: 2017-07-24 | End: 2017-07-24

## 2017-07-24 RX ORDER — ONDANSETRON 4 MG/1
4 TABLET, ORALLY DISINTEGRATING ORAL
Status: DISCONTINUED | OUTPATIENT
Start: 2017-07-24 | End: 2017-07-24

## 2017-07-24 RX ORDER — ACETAMINOPHEN 325 MG/1
650 TABLET ORAL EVERY 6 HOURS PRN
Status: DISCONTINUED | OUTPATIENT
Start: 2017-07-24 | End: 2017-07-28 | Stop reason: HOSPADM

## 2017-07-24 RX ORDER — AMLODIPINE BESYLATE 10 MG/1
10 TABLET ORAL EVERY MORNING
Status: DISCONTINUED | OUTPATIENT
Start: 2017-07-24 | End: 2017-07-28 | Stop reason: HOSPADM

## 2017-07-24 RX ORDER — SODIUM CHLORIDE 9 MG/ML
INJECTION, SOLUTION INTRAVENOUS CONTINUOUS
Status: DISCONTINUED | OUTPATIENT
Start: 2017-07-24 | End: 2017-07-24

## 2017-07-24 RX ORDER — SODIUM CHLORIDE 9 MG/ML
INJECTION, SOLUTION INTRAVENOUS
Status: DISCONTINUED | OUTPATIENT
Start: 2017-07-24 | End: 2017-07-26

## 2017-07-24 RX ORDER — IBUPROFEN 200 MG
24 TABLET ORAL
Status: DISCONTINUED | OUTPATIENT
Start: 2017-07-24 | End: 2017-07-28 | Stop reason: HOSPADM

## 2017-07-24 RX ORDER — AMOXICILLIN 250 MG
1 CAPSULE ORAL 2 TIMES DAILY
Status: DISCONTINUED | OUTPATIENT
Start: 2017-07-24 | End: 2017-07-28 | Stop reason: HOSPADM

## 2017-07-24 RX ORDER — POLYETHYLENE GLYCOL 3350 17 G/17G
17 POWDER, FOR SOLUTION ORAL DAILY
Status: DISCONTINUED | OUTPATIENT
Start: 2017-07-24 | End: 2017-07-28 | Stop reason: HOSPADM

## 2017-07-24 RX ORDER — NICARDIPINE HYDROCHLORIDE 0.2 MG/ML
5 INJECTION INTRAVENOUS CONTINUOUS
Status: DISCONTINUED | OUTPATIENT
Start: 2017-07-24 | End: 2017-07-24

## 2017-07-24 RX ORDER — LABETALOL HYDROCHLORIDE 5 MG/ML
10 INJECTION, SOLUTION INTRAVENOUS EVERY 4 HOURS PRN
Status: DISCONTINUED | OUTPATIENT
Start: 2017-07-24 | End: 2017-07-28 | Stop reason: HOSPADM

## 2017-07-24 RX ORDER — INSULIN ASPART 100 [IU]/ML
1-10 INJECTION, SOLUTION INTRAVENOUS; SUBCUTANEOUS
Status: DISCONTINUED | OUTPATIENT
Start: 2017-07-24 | End: 2017-07-28 | Stop reason: HOSPADM

## 2017-07-24 RX ORDER — CARVEDILOL 25 MG/1
25 TABLET ORAL 2 TIMES DAILY WITH MEALS
Status: DISCONTINUED | OUTPATIENT
Start: 2017-07-24 | End: 2017-07-28 | Stop reason: HOSPADM

## 2017-07-24 RX ORDER — METOCLOPRAMIDE HYDROCHLORIDE 5 MG/ML
10 INJECTION INTRAMUSCULAR; INTRAVENOUS ONCE
Status: COMPLETED | OUTPATIENT
Start: 2017-07-24 | End: 2017-07-24

## 2017-07-24 RX ORDER — ATORVASTATIN CALCIUM 20 MG/1
40 TABLET, FILM COATED ORAL DAILY
Status: DISCONTINUED | OUTPATIENT
Start: 2017-07-24 | End: 2017-07-27

## 2017-07-24 RX ORDER — POTASSIUM CHLORIDE 7.45 MG/ML
10 INJECTION INTRAVENOUS ONCE
Status: DISCONTINUED | OUTPATIENT
Start: 2017-07-24 | End: 2017-07-28

## 2017-07-24 RX ORDER — ONDANSETRON 2 MG/ML
4 INJECTION INTRAMUSCULAR; INTRAVENOUS EVERY 6 HOURS PRN
Status: DISCONTINUED | OUTPATIENT
Start: 2017-07-24 | End: 2017-07-25

## 2017-07-24 RX ORDER — SODIUM CHLORIDE 0.9 % (FLUSH) 0.9 %
3 SYRINGE (ML) INJECTION EVERY 8 HOURS
Status: DISCONTINUED | OUTPATIENT
Start: 2017-07-24 | End: 2017-07-28 | Stop reason: HOSPADM

## 2017-07-24 RX ORDER — METOCLOPRAMIDE HYDROCHLORIDE 5 MG/ML
10 INJECTION INTRAMUSCULAR; INTRAVENOUS EVERY 12 HOURS
Status: DISCONTINUED | OUTPATIENT
Start: 2017-07-24 | End: 2017-07-25

## 2017-07-24 RX ORDER — NICARDIPINE HYDROCHLORIDE 0.2 MG/ML
5 INJECTION INTRAVENOUS CONTINUOUS
Status: DISCONTINUED | OUTPATIENT
Start: 2017-07-24 | End: 2017-07-26

## 2017-07-24 RX ADMIN — METOCLOPRAMIDE 10 MG: 5 INJECTION, SOLUTION INTRAMUSCULAR; INTRAVENOUS at 08:07

## 2017-07-24 RX ADMIN — SODIUM CHLORIDE: 0.9 INJECTION, SOLUTION INTRAVENOUS at 08:07

## 2017-07-24 RX ADMIN — ONDANSETRON 4 MG: 2 INJECTION INTRAMUSCULAR; INTRAVENOUS at 08:07

## 2017-07-24 RX ADMIN — POLYETHYLENE GLYCOL 3350 17 G: 17 POWDER, FOR SOLUTION ORAL at 02:07

## 2017-07-24 RX ADMIN — NICARDIPINE HYDROCHLORIDE 5 MG/HR: 0.2 INJECTION, SOLUTION INTRAVENOUS at 02:07

## 2017-07-24 RX ADMIN — STANDARDIZED SENNA CONCENTRATE AND DOCUSATE SODIUM 1 TABLET: 8.6; 5 TABLET, FILM COATED ORAL at 02:07

## 2017-07-24 RX ADMIN — NICARDIPINE HYDROCHLORIDE 5 MG/HR: 0.2 INJECTION, SOLUTION INTRAVENOUS at 10:07

## 2017-07-24 RX ADMIN — NICARDIPINE HYDROCHLORIDE 5 MG/HR: 0.2 INJECTION, SOLUTION INTRAVENOUS at 08:07

## 2017-07-24 RX ADMIN — ATORVASTATIN CALCIUM 40 MG: 20 TABLET, FILM COATED ORAL at 02:07

## 2017-07-24 RX ADMIN — AMLODIPINE BESYLATE 10 MG: 10 TABLET ORAL at 02:07

## 2017-07-24 RX ADMIN — ONDANSETRON 4 MG: 2 INJECTION INTRAMUSCULAR; INTRAVENOUS at 07:07

## 2017-07-24 RX ADMIN — Medication 3 ML: at 10:07

## 2017-07-24 RX ADMIN — Medication 3 ML: at 02:07

## 2017-07-24 RX ADMIN — CARVEDILOL 25 MG: 25 TABLET, FILM COATED ORAL at 05:07

## 2017-07-24 RX ADMIN — ONDANSETRON 4 MG: 2 INJECTION INTRAMUSCULAR; INTRAVENOUS at 02:07

## 2017-07-24 RX ADMIN — NICARDIPINE HYDROCHLORIDE 7.5 MG/HR: 0.2 INJECTION, SOLUTION INTRAVENOUS at 09:07

## 2017-07-24 RX ADMIN — LISINOPRIL 40 MG: 20 TABLET ORAL at 08:07

## 2017-07-24 RX ADMIN — SODIUM CHLORIDE: 0.9 INJECTION, SOLUTION INTRAVENOUS at 02:07

## 2017-07-24 RX ADMIN — NICARDIPINE HYDROCHLORIDE 5 MG/HR: 0.2 INJECTION, SOLUTION INTRAVENOUS at 07:07

## 2017-07-24 NOTE — HOSPITAL COURSE
-Admit to [facility], pending repeat CTH, SBP <140  -7/25 F/u CTH planned in AM, Neuro Exam stable.   -7/26 Transfer to Stroke Service.

## 2017-07-24 NOTE — PT/OT/SLP EVAL
Occupational Therapy  Evaluation    Vaughn Retana   MRN: [id number]   Admitting Diagnosis: Nontraumatic intracerebral hemorrhage, unspecified    OT Date of Treatment: 07/24/17   OT Start Time: 1405  OT Stop Time: 1429  OT Total Time (min): 24 min    Billable Minutes:  Evaluation 24    Diagnosis: Nontraumatic intracerebral hemorrhage, unspecified     Past Medical History:   Diagnosis Date    Amputation stump pain 9/10/2013    Aspiration pneumonia 7/27/2015    Asterixis 11/8/2016    C. difficile colitis 8/7/2015    Cholelithiasis without obstruction 8/25/2015    Chronic diastolic heart failure     2-23-17   1 - Low normal to mildly depressed left ventricular systolic function (EF 50-55%).    2 - Right ventricular enlargement with mildly depressed systolic function.    3 - Left ventricular diastolic dysfunction.    4 - Right atrial enlargement.    5 - Severe tricuspid regurgitation.    6 - Pulmonary hypertension. The estimated PA systolic pressure is 86 mmHg.    7 - Increased central venous pressure.    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    Metabolic acidosis, IAG, reduced excretion of inorganic acids     Myoclonic jerking 9/20/2016    Secondary hyperparathyroidism (of renal origin)     Secondary pulmonary hypertension 3/23/2017    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 HD Patient MWF, K+ lab prior to procedure.     FOOT AMPUTATION THROUGH METATARSAL      left foot    LEG AMPUTATION THROUGH KNEE  12/18/2013    left BKA    R AVF  9/12/12       Referring physician: MARYJO Burnett  Date referred to OT: 7/24    General Precautions: Standard, aspiration, fall, NPO, seizure  Orthopedic Precautions: N/A  Braces: N/A    Do you have any cultural, spiritual, Adventism conflicts, given your current situation?: none     Patient History:  Patient resides in [place], [place] with brother and niece in a single story house with less than five steps to enter and bilateral handrail. PTA, patient was independent in all self-care activities. Patient did not drive. Patient is R handed. Owns no DME. 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 Hobbies include fishing and watching TV. Roles/responsibilities include being a father, , son, brother, uncle.    Subjective:  Communicated with RN prior to session.  Patient: "I need a walker."     Pain/Comfort  Pain Rating 1: 0/10  Pain Rating Post-Intervention 1: 0/10    Objective:  Patient found with: blood pressure cuff, peripheral IV, pulse ox (continuous), telemetry  No family present  RN notified about patient nauseated    Cognitive Exam:  Oriented to: Person, Place and Situation  Follows Commands/attention: distracted 2* patient nauseated   Communication: mild dysarthria  Memory: to further assess  Safety awareness/insight to disability: impaired  Coping skills/emotional control: Appropriate to situation    Visual/perceptual:  Unable to assess 2* patient nauseated     Physical Exam:  Postural examination/scapula alignment: Rounded shoulder and Head forward  Skin integrity: Visible skin intact  Edema: None noted     Sensation: "   Intact  light/touch BUE; further assessment of proprioception, kinesthesia, temperature and sharp dull needed. Unable to assess 2* patient cognition and nauseated    Upper Extremity Range of Motion:  Right Upper Extremity: WFL  Left Upper Extremity: WFL    Upper Extremity Strength:  Right Upper Extremity: WFL except 4/5 horizontal ab/adduction, elbow flexion/extension,   Left Upper Extremity: WFL except 4/5 horizontal ab/adduction   Strength:   strength of R over L    Fine motor coordination:   Unable to assess 2* patient nauseated     Functional Mobility:  Bed Mobility:  Scooting/Bridging: Total Assistance (to edge of stretcher; total assistance with drawsheet to head of stretcher while supine)  Supine to Sit: Moderate Assistance (from the L side)  Sit to Supine: Moderate Assistance    Activities of Daily Living:  Feeding Level of Assistance:  (NPO)  UE Dressing Level of Assistance: Total assistance (while sitting on edge of stretcher; assistance for balance, sequencing, donning gown around back and fasteners)    Therapeutic Activities and Exercises:  Patient was alert throughout session; however, severely nauseated throughout evaluation. Once EOB, patient needed CGA to maintain balance. Patient was able to follow 3/5 commands, sequence 7/7 days and 12/12 months of the year (noted perseveration). Patient education was provided on role of OT and OT POC. Patient verbalized understanding of all education and had no further questions for OT. Continued family and patient education/training recommended.    AM-PAC 6 CLICK ADL  How much help from another person does this patient currently need?  1 = Unable, Total/Dependent Assistance  2 = A lot, Maximum/Moderate Assistance  3 = A little, Minimum/Contact Guard/Supervision  4 = None, Modified [place]/Independent    Putting on and taking off regular lower body clothing? : 1  Bathing (including washing, rinsing, drying)?: 1  Toileting, which includes  "using toilet, bedpan, or urinal? : 1  Putting on and taking off regular upper body clothing?: 1  Taking care of personal grooming such as brushing teeth?: 1  Eating meals?: 1  Total Score: 6    AM-PAC Raw Score CMS "G-Code Modifier Level of Impairment Assistance   6 % Total / Unable   7 - 9 CM 80 - 100% Maximal Assist   10-14 CL 60 - 80% Moderate Assist   15 - 19 CK 40 - 60% Moderate Assist   20 - 22 CJ 20 - 40% Minimal Assist   23 CI 1-20% SBA / CGA   24 CH 0% Independent/ Mod I       Patient left supine with all lines intact, call button in reach, RN notified and whiteboard updated    Assessment:  Vaughn Retana is a 52 y.o. male with a medical diagnosis of Nontraumatic intracerebral hemorrhage, unspecified and presents with performance deficits of physical skills including impaired balance, strength. Additionally patient demonstrates performance deficits in the cognitive domain with impaired memory, orientation, attention, perception, problem solving, communication. The above mentioned deficits have limited this patient to participate in activities that include but are not limited to: bed mobility, transfers, ascending/descending stairs, having functional mobility in both short and long distances, walking, walking around obstacles, performing transitional movement patterns (kneeling, bending); performing activities of daily living such as:  dressing, grooming, toileting, bathing, and other self-care activities). Patient's roles and responsibilities as , father, son, brother, and uncle have also been affected. Patient will benefit from skilled OT services to maximize level of independence in above-mentioned activities. Patient demonstrated severe nausea throughout evaluation. Thus, more in depth evaluation needed for sensory, visual, memory, transfers, and standing.    Rehab identified problem list/impairments: Rehab identified problem list/impairments: weakness, impaired endurance, impaired " sensation, impaired self care skills, impaired functional mobilty, impaired balance, gait instability, impaired cognition, decreased upper extremity function, decreased lower extremity function, decreased safety awareness, impaired coordination, impaired fine motor    Rehab potential is good.    Activity tolerance: Fair    Discharge recommendations: Discharge Facility/Level Of Care Needs: outpatient OT     Barriers to discharge: Barriers to Discharge: None    Equipment recommendations:  (TBD)     GOALS:    Occupational Therapy Goals        Problem: Occupational Therapy Goal    Goal Priority Disciplines Outcome Interventions   Occupational Therapy Goal     OT, PT/OT Ongoing (interventions implemented as appropriate)    Description:  Goals set 7/24 to be addressed for 14 days with expiration date 8/7:   Patient will increase functional independence with ADLs by performing:    Patient will demonstrate rolling to the right with modified independence  Patient will demonstrate rolling to the left with modified independence  Patient will demonstrate supine to sit with SBA  Patient will demonstrate stand-pivot transfers with min A  Patient will demonstrate upper body dressing with min A while seated EOB  Patient will demonstrate lower body dressing with min A while seated EOB  Patient will demonstrate grooming while standing mod A  Patient will demonstrate toileting with mod A for managing clothes and hygiene  Patient will demonstrate bathing while seated EOB with mod A  Patient's family/caregiver will demonstrate independence and safety with assisting patient with self-care and functional mobility.  Patient and or patient's family will verbalize understanding of stroke prevention guidelines, personal risk factors and stroke warning signs via teachback method.                       PLAN:  Patient to be seen 6 x/week to address the above listed problems via self-care/home management, community/work re-entry, therapeutic  activities, therapeutic exercises, neuromuscular re-education, cognitive retraining, sensory integration  Plan of Care expires: 08/22/17  Plan of Care reviewed with: patient         Padma Sue, SOT  07/24/2017

## 2017-07-24 NOTE — HPI
"53 yo male with PMhx significant for ESRD on dialysis, previous ICH, HTN, and T2DM.  Pt presented to [facility] this morning after "passing out" at home.  Pt lives at home with sister, he was found down and slightly confused.  Upon arrival to the ED he was hypertensive in the 190's.  HCT revealed acute left thalamic ICH, neurosurgery consulted for evaluation.  Pt currently c/o right sided weakness, and some nausea.  Carroll HAs, visual changes, seizures.  No reported use of anticoagulants at home.    "

## 2017-07-24 NOTE — ED NOTES
Patient identifiers verified and correct for Vaughn Retana.    LOC: The patient is awake, alert and aware of environment with an appropriate affect, the patient is oriented x 3 and speaking appropriately w/ delayed responses noted.  APPEARANCE: Patient resting comfortably and in no acute distress, patient is clean and well groomed, patient's clothing is properly fastened.  SKIN: The skin is warm and dry, color consistent with ethnicity, patient has normal skin turgor and moist mucus membranes, skin intact, no breakdown or bruising noted.  MUSCULOSKELETAL: Patient moving all extremities spontaneously, no obvious swelling or deformities noted. + left BKA w/ brace applied.   RESPIRATORY: Airway is open and patent, respirations are spontaneous, patient has a normal effort and rate, no accessory muscle use noted, bilateral breath sounds clear.  CARDIAC: Patient has a normal rate and regular rhythm, no periphreal edema noted, capillary refill < 3 seconds.  ABDOMEN: Soft and non tender to palpation, no distention noted. Pt reports + generalized abd pains an discomfort rated 5/10 on p[ain scale. + nausea w/ vomiting.   NEUROLOGIC: PERRL, 2 mm bilaterally, eyes open spontaneously, behavior appropriate to situation, follows commands, facial expression symmetrical, bilateral hand grasp equal and even, purposeful motor response noted, normal sensation in all extremities when touched with a finger. ** SEE FULL NEURO ASSESSMENT.

## 2017-07-24 NOTE — ED TRIAGE NOTES
Pt transferred from [facility] via [facility] for a intracranial bleed. Pt was complaining of dizziness last night and has a hx of bleeds. Pt arrived on Cardene at 25 ml/hr.EMS reports pt has intermittent periods of confusion.

## 2017-07-24 NOTE — SUBJECTIVE & OBJECTIVE
(Not in a hospital admission)    Review of patient's allergies indicates:   Allergen Reactions    Fosrenol [lanthanum] Nausea And Vomiting     Nausea and vomiting       Past Medical History:   Diagnosis Date    Amputation stump pain 9/10/2013    Aspiration pneumonia 7/27/2015    Asterixis 11/8/2016    C. difficile colitis 8/7/2015    Cholelithiasis without obstruction 8/25/2015    Chronic diastolic heart failure     2-23-17   1 - Low normal to mildly depressed left ventricular systolic function (EF 50-55%).    2 - Right ventricular enlargement with mildly depressed systolic function.    3 - Left ventricular diastolic dysfunction.    4 - Right atrial enlargement.    5 - Severe tricuspid regurgitation.    6 - Pulmonary hypertension. The estimated PA systolic pressure is 86 mmHg.    7 - Increased central venous pressure.    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    Metabolic acidosis, IAG, reduced excretion of inorganic acids     Myoclonic jerking 9/20/2016    Secondary hyperparathyroidism (of renal origin)     Secondary pulmonary hypertension 3/23/2017    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 HD Patient MWF, K+ lab prior to procedure.     FOOT AMPUTATION THROUGH METATARSAL      left foot    LEG AMPUTATION THROUGH KNEE  12/18/2013    left BKA    R AVF  9/12/12     Family History     Problem Relation (Age of Onset)    Alcohol abuse Maternal Grandmother    Diabetes Brother, Maternal Grandfather    Early death Mother    Heart disease Father    Hyperlipidemia Father    Hypertension Father    Kidney disease Father        Social History Main Topics    Smoking status: Former Smoker     Packs/day: 1.00     Years: 10.00    Smokeless tobacco: Never Used    Alcohol use No    Drug use: No    Sexual activity: Not on file     Review of Systems   Constitutional: no fever or chills  Eyes: no visual changes  ENT: no nasal congestion or sore throat  Respiratory: no cough or shortness of breath  Cardiovascular: no chest pain or palpitations  Gastrointestinal: no nausea or vomiting  Genitourinary: no hematuria or dysuria  Integument/Breast: no rash or pruritis  Hematologic/Lymphatic: no easy bruising or lymphadenopathy  Musculoskeletal: no arthralgias or myalgias  Neurological: no seizures or tremors      Objective:     Weight: 78.5 kg (173 lb)  Body mass index is 27.92 kg/m².  Vital Signs (Most Recent):  Temp: 98.1 °F (36.7 °C) (07/24/17 0804)  Pulse: 101 (07/24/17 0930)  Resp: 20 (07/24/17 0930)  BP: 138/69 (07/24/17 0930)  SpO2: 95 % (07/24/17 0930) Vital Signs (24h Range):  Temp:  [97.9 °F (36.6 °C)-98.1 °F (36.7 °C)] 98.1 °F (36.7 °C)  Pulse:  [] 101  Resp:  [16-23] 20  SpO2:  [95 %-100 %] 95 %  BP: (130-191)/() 138/69                         Neurosurgery Physical Exam   General: no distress  Neurologic: Alert and oriented to person, place, not year  Head: normocephalic  GCS: Motor: 6/Verbal: 4/Eyes: 4 GCS Total: 14  Cranial nerves: face symmetric, tongue midline, pupils equal, round, reactive to light with accomodation, extraocular muscles intact  Sensory: response to  light touch throughout  Motor Strength: RUE/RLE - 4/5, otherwise full strength on Left upper and lower extremities  Pronator Drift: + drift on right  Finger to nose normal   Lungs:  normal respiratory effort  Abdomen: soft, non-tender   Extremities: no cyanosis or edema, or clubbing         Significant Labs:    Recent Labs  Lab 07/24/17  0647   *      K 5.2*   CL 79*   CO2 40*   BUN 37*   CREATININE 11.7*   CALCIUM 11.7*       Recent Labs  Lab 07/24/17  0647   WBC 8.65   HGB 12.7*   HCT 37.4*        No results for input(s): LABPT, INR, APTT in the last 48 hours.  Microbiology Results (last 7 days)     ** No results found for the last 168 hours. **          Significant Diagnostics:  CT: Ct Head Without Contrast    Result Date: 7/24/2017  1.8 cm left thalamic acute parenchymal hemorrhage with mild surrounding vasogenic edema and local mass effect. No midline shift. Small remote right thalamic infarct. Mild chronic microvascular ischemic changes.   Preliminary findings discussed with ABBIE ADAME at 6:58:41 on 07/24/17. Electronically signed by: SONIA TONG MD Date: 07/24/17 Time: 07:05

## 2017-07-24 NOTE — ED NOTES
Patient moved to ED room 4 via EMS, patient assisted onto stretcher and changed into a gown. Patient placed on cardiac monitor, continuous pulse oximetry and automatic blood pressure cuff. Bed placed in low locked position, side rails up x 2, call light is within reach of patient or family, orientation to room and explanation of wait provided to family and patient, alarms set and turned on for monitor and pulse ox, awaiting MD evaluation and orders, will continue to monitor.

## 2017-07-24 NOTE — HOSPITAL COURSE
7/25/17-neurologically stable, has some mild aphasia and inattention, N/V remains  07/26/17-neurologically stable, off cardene, plans for step down, patient had enema with large bowel movement this morning.  7/27 - fever, leukocytosis.  Fall without head trauma.  Infectious workup initiated.  7/28 - Infectious workup unrevealing, no further fevers, WBC 12.  Discharge to rehab.

## 2017-07-24 NOTE — CONSULTS
[facility]  Nephrology  Consult Note    Patient Name: Vaughn Retana  MRN: [id number]  Admission Date: 7/24/2017  Hospital Length of Stay: 0 days  Attending Provider: Jeff Spencer MD   Primary Care Physician: Primary Doctor No  Principal Problem:Nontraumatic intracerebral hemorrhage, unspecified    Inpatient consult to Neurosurgery  Consult performed by: KIARRA LAYTON  Consult ordered by: YO ALBERT  Reason for consult: ESRD on HD with hyperkalemia        Subjective:     HPI: A 52 year old  male, he dialyzes MWF and [facility] under the care of Dr. Hubbard.  His EDW is 73 kg.  Treatment duration of 3:45 minutes.  He has AVF to [facility]. Patient presented at [facility] after passing out in his home, which was witnessed by his sister, whom refer that was down, right side weakness and slightly confused. Upon arrival to ED presented with hypertension, head CT without contrast showed ICH, as chest x ray dose note no pleural fluid of any substantial volume is seen on either side or cardiac decompensation.     Past Medical History:   Diagnosis Date    Amputation stump pain 9/10/2013    Aspiration pneumonia 7/27/2015    Asterixis 11/8/2016    C. difficile colitis 8/7/2015    Cholelithiasis without obstruction 8/25/2015    Chronic diastolic heart failure     2-23-17   1 - Low normal to mildly depressed left ventricular systolic function (EF 50-55%).    2 - Right ventricular enlargement with mildly depressed systolic function.    3 - Left ventricular diastolic dysfunction.    4 - Right atrial enlargement.    5 - Severe tricuspid regurgitation.    6 - Pulmonary hypertension. The estimated PA systolic pressure is 86 mmHg.    7 - Increased central venous pressure.    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    Metabolic acidosis, IAG, reduced excretion of inorganic acids     Myoclonic jerking 9/20/2016    Secondary hyperparathyroidism (of renal origin)     Secondary pulmonary hypertension 3/23/2017    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    TB lung, latent 08/25/2015    Negative Quantiferon Gold 3-23-17       Past Surgical History:   Procedure Laterality Date    COLONOSCOPY      COLONOSCOPY N/A 4/4/2017    Procedure: COLONOSCOPY;  Surgeon: Walker Stern MD;  Location: [facility] ([address] [place] [address]);  Service: Endoscopy;  Laterality: N/A;  PA Systolic Pressure 85.56. HD Patient MWF, K+ lab prior to procedure.     FOOT AMPUTATION THROUGH METATARSAL      left foot    LEG AMPUTATION THROUGH KNEE  12/18/2013    left BKA    R AVF  9/12/12       Review of patient's allergies indicates:   Allergen Reactions    Fosrenol [lanthanum] Nausea And Vomiting     Nausea and vomiting     Current Facility-Administered Medications   Medication Frequency    0.9%  NaCl infusion Continuous    acetaminophen tablet 650 mg Q6H PRN    labetalol injection 10 mg Q4H PRN    niCARdipine 40 mg/200 mL infusion Continuous    polyethylene glycol packet 17 g Daily    senna-docusate 8.6-50 mg per tablet 1 tablet BID    sodium chloride 0.9% flush 3 mL Q8H     Current Outpatient Prescriptions   Medication    acetaminophen (TYLENOL) 500 MG tablet    amlodipine (NORVASC) 10 MG tablet    atorvastatin (LIPITOR) 40 MG tablet    calcium carbonate (OS-FERNANDA) 500 mg calcium (1,250 mg) chewable tablet    carvedilol (COREG) 25 MG tablet    chlorproMAZINE (THORAZINE) 25 MG tablet     cinacalcet (SENSIPAR) 60 MG Tab    hydrALAZINE (APRESOLINE) 50 MG tablet    lisinopril (PRINIVIL,ZESTRIL) 40 MG tablet    omeprazole (PRILOSEC) 40 MG capsule    ondansetron (ZOFRAN) 8 MG tablet    ondansetron (ZOFRAN-ODT) 8 MG TbDL     Family History     Problem Relation (Age of Onset)    Alcohol abuse Maternal Grandmother    Diabetes Brother, Maternal Grandfather    Early death Mother    Heart disease Father    Hyperlipidemia Father    Hypertension Father    Kidney disease Father        Social History Main Topics    Smoking status: Former Smoker     Packs/day: 1.00     Years: 10.00    Smokeless tobacco: Never Used    Alcohol use No    Drug use: No    Sexual activity: Not on file     Review of Systems   Constitutional: Positive for activity change. Negative for appetite change, fatigue and fever.   HENT: Negative for ear pain, facial swelling, sore throat, trouble swallowing and voice change.    Respiratory: Negative for cough, chest tightness, shortness of breath and wheezing.    Cardiovascular: Negative for chest pain, palpitations and leg swelling.   Gastrointestinal: Negative for abdominal pain, diarrhea, nausea and vomiting.   Genitourinary: Negative for flank pain.   Musculoskeletal: Negative for back pain, joint swelling, neck pain and neck stiffness.   Skin: Negative for color change, rash and wound.   Neurological: Positive for weakness. Negative for seizures, facial asymmetry and numbness.   Psychiatric/Behavioral: Positive for confusion.     Objective:     Vital Signs (Most Recent):  Temp: 98.1 °F (36.7 °C) (07/24/17 0804)  Pulse: 99 (07/24/17 1200)  Resp: 20 (07/24/17 1200)  BP: 138/77 (07/24/17 1200)  SpO2: 95 % (07/24/17 0930)  O2 Device (Oxygen Therapy): room air (07/24/17 0648) Vital Signs (24h Range):  Temp:  [97.9 °F (36.6 °C)-98.1 °F (36.7 °C)] 98.1 °F (36.7 °C)  Pulse:  [] 99  Resp:  [12-23] 20  SpO2:  [95 %-100 %] 95 %  BP: (118-191)/() 138/77     Weight: 78.5 kg (173 lb)  (07/24/17 0648)  Body mass index is 27.92 kg/m².  Body surface area is 1.91 meters squared.    No intake/output data recorded.    Physical Exam   Constitutional: He appears well-developed and well-nourished. He is active. He has a sickly appearance. Nasal cannula in place.   HENT:   Head: Normocephalic.   Right Ear: External ear normal.   Left Ear: External ear normal.   Nose: Nose normal.   Mouth/Throat: Mucous membranes are dry.   Eyes: Conjunctivae and EOM are normal. Pupils are equal, round, and reactive to light.   Neck: No hepatojugular reflux and no JVD present. Carotid bruit is not present.   Cardiovascular: Regular rhythm and normal heart sounds.  Tachycardia present.    No murmur heard.  Pulmonary/Chest: No stridor. He has rales in the right lower field and the left lower field.   Abdominal: Soft. Bowel sounds are normal. He exhibits no shifting dullness, no distension and no mass. There is no tenderness. No hernia.   Musculoskeletal:   Left leg bellow Knee amputation, Right leg no edma   Neurological: He is alert.   Psychiatric: His speech is delayed.       Significant Labs:  ABGs:   Recent Labs  Lab 07/24/17  0714   PH 7.517*   PCO2 63.6*   HCO3 51.6*   POCSATURATED 75*   BE 29     Cardiac Markers: No results for input(s): CKMB, TROPONINT, MYOGLOBIN in the last 168 hours.  CBC:   Recent Labs  Lab 07/24/17  0647   WBC 8.65   RBC 3.88*   HGB 12.7*   HCT 37.4*      MCV 96   MCH 32.7*   MCHC 34.0     CMP:   Recent Labs  Lab 07/24/17  0647   *   CALCIUM 11.7*      K 5.2*   CO2 40*   CL 79*   BUN 37*   CREATININE 11.7*     Coagulation:   Recent Labs  Lab 07/24/17  0854   INR 1.1   APTT 24.5     Microbiology Results (last 7 days)     ** No results found for the last 168 hours. **        All labs within the past 24 hours have been reviewed.        Assessment/Plan:     ESRD on hemodialysis    A 52 year old  male, he dialyzes MWF and [facility] under the care of Dr. Hubbard.   His EDW is 73 kg.  Treatment duration of 3:45 minutes.  He has AVF to [facility]. Arrived due to ICH on CT without contrast, patient doesn't present volume overload on chest x ray.   - Blood pressure control of ICH to have systolic <140.  - Neuro critical care are agree that patient can be started on HD for removal of toxins, would leave net even in ultrafiltration and see how patient tolerates treatment. Hemodynamically stable, with metabolic alkalosis with respiratory acidosis compensation, will be started on a low biacarb bath, goal is to maintain sodium between 140-145.     - Monitor closely lab results for any change in treatment.               Nic Kapoor MD  Nephrology  [facility]

## 2017-07-24 NOTE — ED PROVIDER NOTES
Encounter Date: 7/24/2017       History     Chief Complaint   Patient presents with    Altered Mental Status     Pt with AMS beginning this AM     53 y/o male with ESRD on HD and h/o ICH here with no specific complaint and unable to provide any additional history.  Per EMS patient with generalized weakness and a called in as a stroke code.    7:22 AM  Sister at bedside and states patient found on the floor this morning unable to stand up and with persistent vomiting.  Pt now able to provide additional hx.  Denies any recent illness.  States he lost his balance this morning while getting ready for dialysis.  He denies any HA, dizziness, CP, SOB, numbness, weakness, and nausea.  Not on anticoagulants.  Last dialyzed Friday 7/21.               Review of patient's allergies indicates:   Allergen Reactions    Fosrenol [lanthanum] Nausea And Vomiting     Nausea and vomiting     Past Medical History:   Diagnosis Date    Amputation stump pain 9/10/2013    Aspiration pneumonia 7/27/2015    Asterixis 11/8/2016    C. difficile colitis 8/7/2015    Cholelithiasis without obstruction 8/25/2015    Chronic diastolic heart failure     2-23-17   1 - Low normal to mildly depressed left ventricular systolic function (EF 50-55%).    2 - Right ventricular enlargement with mildly depressed systolic function.    3 - Left ventricular diastolic dysfunction.    4 - Right atrial enlargement.    5 - Severe tricuspid regurgitation.    6 - Pulmonary hypertension. The estimated PA systolic pressure is 86 mmHg.    7 - Increased central venous pressure.    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    Metabolic acidosis, IAG, reduced excretion of inorganic acids     Myoclonic jerking 9/20/2016    Secondary hyperparathyroidism (of renal origin)     Secondary pulmonary hypertension 3/23/2017    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    TB lung, latent 08/25/2015    Negative Quantiferon Gold 3-23-17     Past Surgical History:   Procedure Laterality Date    COLONOSCOPY      COLONOSCOPY N/A 4/4/2017    Procedure: COLONOSCOPY;  Surgeon: Walker Stern MD;  Location: [facility] ([address] [place] [address]);  Service: Endoscopy;  Laterality: N/A;  PA Systolic Pressure 85.56. HD Patient MWF, K+ lab prior to procedure.     FOOT AMPUTATION THROUGH METATARSAL      left foot    LEG AMPUTATION THROUGH KNEE  12/18/2013    left BKA    R AVF  9/12/12     Family History   Problem Relation Age of Onset    Early death Mother     Kidney disease Father     Hypertension Father     Heart disease Father     Hyperlipidemia Father     Diabetes Brother     Alcohol abuse Maternal Grandmother     Diabetes Maternal Grandfather     Melanoma Neg Hx      Social History   Substance Use Topics    Smoking status: Former Smoker     Packs/day: 1.00     Years: 10.00    Smokeless tobacco: Never Used    Alcohol use No     Review of Systems   Unable to perform ROS: Mental status change       Physical Exam     Initial Vitals   BP Pulse Resp Temp SpO2   07/24/17 0645 07/24/17 0645 07/24/17 0645 07/24/17 0648 07/24/17 0645   (!) 169/81 100 16 97.9 °F (36.6 °C) 100 %      MAP       07/24/17 0645       110.33         Physical Exam    Vitals reviewed.  Constitutional: He appears well-developed and well-nourished.   HENT:   Head: Normocephalic and atraumatic.   Right Ear: External ear normal.   Left Ear: External ear normal.   Nose: Nose normal.   Eyes: Conjunctivae and EOM are  normal. Right eye exhibits no discharge. Left eye exhibits no discharge.   Neck: Normal range of motion. Neck supple.   Cardiovascular: Normal rate, regular rhythm and normal heart sounds.   Pulmonary/Chest: Breath sounds normal. No respiratory distress. He has no wheezes.   Abdominal: Soft. Bowel sounds are normal.   Musculoskeletal: Normal range of motion.   Left BKA   Neurological: He is alert and oriented to person, place, and time. No cranial nerve deficit.   Oriented x 3, no pronator drift, strength 5/5 BUE and RLE   Skin: Skin is warm and dry.         ED Course   Critical Care  Date/Time: 7/24/2017 7:27 AM  Performed by: ABBIE ADAME.  Authorized by: ABBIE ADAME   Direct patient critical care time: 20 minutes  Additional history critical care time: 5 minutes  Ordering / reviewing critical care time: 5 minutes  Documentation critical care time: 5 minutes  Consulting other physicians critical care time: 5 minutes  Consult with family critical care time: 10 minutes  Total critical care time (exclusive of procedural time) : 50 minutes  Critical care was necessary to treat or prevent imminent or life-threatening deterioration of the following conditions: CNS failure or compromise.  Critical care was time spent personally by me on the following activities: discussions with consultants, evaluation of patient's response to treatment, ordering and review of laboratory studies, obtaining history from patient or surrogate, review of old charts, re-evaluation of patient's condition, ordering and review of radiographic studies, examination of patient and development of treatment plan with patient or surrogate.        Labs Reviewed   CBC W/ AUTO DIFFERENTIAL - Abnormal; Notable for the following:        Result Value    RBC 3.88 (*)     Hemoglobin 12.7 (*)     Hematocrit 37.4 (*)     MCH 32.7 (*)     Lymph% 17.7 (*)     All other components within normal limits   POCT GLUCOSE - Abnormal; Notable for the following:      POCT Glucose 135 (*)     All other components within normal limits   BASIC METABOLIC PANEL   TYPE & SCREEN                Additional MDM:   Comments: 53 y/o male BIB EMS with generalized weakness.  On my assessment he had normal strength but appeared confused.  He could provide a reason for his ED visit.  CT head was obtained 2/2 AMS and significant for a left intraparenchymal hemorrhage.  Case d/w Dr. Gallagher through the transfer center and patient accepted for ED to ED transfer.  Labs pending.  Nicardipine gtt started 2/2 BP >190.  .                 ED Course     Clinical Impression:     1. Intraparenchymal hemorrhage of brain    2. Altered mental state    3. ESRD (end stage renal disease) on dialysis                             Joann Navarro MD  07/24/17 3054

## 2017-07-24 NOTE — CONSULTS
[facility]  Vascular Neurology  Comprehensive Stroke Center  Consult Note    Inpatient consult to Vascular (Stroke) Neurology  Consult performed by: DELROY MANCILLA  Consult ordered by: YO ALBERT        Assessment/Plan:     Patient is a 52 y.o. year old male with:    * Nontraumatic intracerebral hemorrhage, unspecified    Vaughn Retana is a 52 y.o. male with history of multiple subcortical ICH presents to hospital with dizziness, RSW, and N/V and found to have a L thalamic ICH    Antiplatelets: none, ICH  Statins: none, ICH  SBP <140  DVT ppx: SCDs  PT/OT and speech to evaluate and treat  Neuro checks q1h    Recommend MRI, CTA, echo, lipid panel, A1C, and TSH                                        Vasogenic cerebral edema    2/2 ICH  Evident on imaging        ESRD on hemodialysis    On MWF  Nephrology consulted        Dyslipidemia    Stroke risk factor  Check lipid panel  Okay to hold home atorvastatin in setting of acute ICH          Type 2 diabetes mellitus with chronic kidney disease on chronic dialysis    Stroke risk factor  A1C 5.0  Management per primary team            Thrombolysis Candidate? No  1. Contraindications: CT findings (ICH, SAH, major infact sign)     Interventional Revascularization Candidate?  No, ICH    Research Candidate? No    Subjective:     History of Present Illness:  Vaughn Retana is a 52 y.o. male with a PMHx of HF, DM, ESRD on hemodialysis, HCV, ICH (L BG 5/2013; R BG 9/2016; R BG 11/2016; L caudate head 2/2017), and HTN who presented to OSH with dizziness. Patient became dizzy last night and this morning he had a fall while getting ready for dialysis. He was transferred to [facility] for further care. While in ED, he complained of RSW and N/V.         Past Medical History:   Diagnosis Date    Amputation stump pain 9/10/2013    Aspiration pneumonia 7/27/2015    Asterixis 11/8/2016    C. difficile colitis 8/7/2015    Cholelithiasis without obstruction  8/25/2015    Chronic diastolic heart failure     2-23-17   1 - Low normal to mildly depressed left ventricular systolic function (EF 50-55%).    2 - Right ventricular enlargement with mildly depressed systolic function.    3 - Left ventricular diastolic dysfunction.    4 - Right atrial enlargement.    5 - Severe tricuspid regurgitation.    6 - Pulmonary hypertension. The estimated PA systolic pressure is 86 mmHg.    7 - Increased central venous pressure.    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 HD Patient MWF, K+ lab prior to procedure.     FOOT AMPUTATION THROUGH METATARSAL      left foot    LEG AMPUTATION THROUGH KNEE  12/18/2013    left BKA    R AVF  9/12/12     Family History   Problem Relation Age of Onset    Early death Mother     Kidney disease Father     Hypertension Father     Heart disease Father     Hyperlipidemia  Father     Diabetes Brother     Alcohol abuse Maternal Grandmother     Diabetes Maternal Grandfather     Melanoma Neg Hx      Social History   Substance Use Topics    Smoking status: Former Smoker     Packs/day: 1.00     Years: 10.00    Smokeless tobacco: Never Used    Alcohol use No     Review of patient's allergies indicates:   Allergen Reactions    Fosrenol [lanthanum] Nausea And Vomiting     Nausea and vomiting     Medications: I have reviewed the current medication administration record.      (Not in a hospital admission)    Review of Systems   Constitutional: Negative for chills and fever.   HENT: Negative for drooling.    Eyes: Negative for visual disturbance.   Respiratory: Negative for cough and shortness of breath.    Cardiovascular: Negative for chest pain and palpitations.   Gastrointestinal: Positive for nausea and vomiting.   Skin: Negative for rash.   Neurological: Positive for speech difficulty and weakness. Negative for headaches.   Psychiatric/Behavioral: Positive for confusion. Negative for agitation.     Objective:     Vital Signs (Most Recent):  Temp: 98.1 °F (36.7 °C) (07/24/17 0804)  Pulse: 99 (07/24/17 1200)  Resp: 20 (07/24/17 1200)  BP: 138/77 (07/24/17 1200)  SpO2: 95 % (07/24/17 0930)    Vital Signs Range (Last 24H):  Temp:  [97.9 °F (36.6 °C)-98.1 °F (36.7 °C)]   Pulse:  []   Resp:  [12-23]   BP: (118-191)/()   SpO2:  [95 %-100 %]     Physical Exam   Constitutional: He is oriented to person, place, and time. He appears well-developed and well-nourished. No distress.   HENT:   Head: Normocephalic and atraumatic.   Eyes: EOM are normal. Pupils are equal, round, and reactive to light.   Cardiovascular: Tachycardia present.    Pulmonary/Chest: Effort normal. No respiratory distress.   Abdominal: Soft.   Neurological: He is alert and oriented to person, place, and time. GCS eye subscore is 4 - spontaneous. GCS verbal subscore is 4 - confused. GCS motor subscore is 6 -  obeys commands.   Skin: Skin is warm and dry.   Vitals reviewed.      Neurological Exam:   LOC: alert and follows requests  Language: No aphasia  Speech: No dysarthria  Orientation: Person, Place, Time  Memory: Recent memory intact, Remote memory intact, Age correct, Month correct  Visual Fields (CN II): Full  EOM (CN III, IV, VI): Full/intact  Pupils (CN III, IV, VI): PERRL  Facial Sensation (CN V): Symmetric  Facial Movement (CN VII): symmetric facial expression  Hearing (CN VIII): intact bilaterally  Motor*: Arm Left:  Normal (5/5), Leg Left:   Normal (5/5), Arm Right:   Paretic:  4/5, Leg Right:   Paretic:  4/5  Cerebellar*: Normal limb, Normal gait  , Normal stance  Sensation: intact to light touch, temperature and vibration  Tone: Arm-Left: normal; Leg-Left: normal; Arm-Right: normal; Leg-Right: normal    NIH Stroke Scale:  Interval: baseline (upon arrival/admit)  Level of Consciousness: 0 - alert  LOC Questions: 1 - answers one correctly  LOC Commands: 0 - performs both correctly  Best Gaze: 0 - normal  Visual: 0 - no visual loss  Facial Palsy: 0 - normal  Motor Left Arm: 0 - no drift  Motor Right Arm: 1 - drift  Motor Left Le - no drift  Motor Right Le - drift  Limb Ataxia: 0 - absent  Sensory: 0 - normal  Best Language: 0 - no aphasia  Dysarthria: 1 - mild to moderate dysarthria  Extinction and Inattention: 0 - no neglect  NIH Stroke Scale Total: 4  [place] Coma Scale:  Best Eye Response: 4 - spontaneous  Best Motor Response: 6 - obeys commands  Best Verbal Response: 4 - confused  [place] Coma Scale Total: 14  Modified Kary Scale:   Timeline: Prior to symptoms onset  Modified [place] Score: 0 - no symptoms    ICH Scale:   [place] Coma Score: 0 - 13-15  Age > or = 80: 0 - no  ICH Volume > or = 30 mL: 0 - no  Intraventricular Hemorrhage: 0 - no  Infratentorial Origin of Hemorrhage: 0 - no  ICH Scale Total: 0      Laboratory:  CMP:   Recent Labs  Lab 17  0647   CALCIUM 11.7*      K 5.2*    CO2 40*   CL 79*   BUN 37*   CREATININE 11.7*     CBC:   Recent Labs  Lab 07/24/17  0647   WBC 8.65   RBC 3.88*   HGB 12.7*   HCT 37.4*      MCV 96   MCH 32.7*   MCHC 34.0     Lipid Panel: No results for input(s): CHOL, LDLCALC, HDL, TRIG in the last 168 hours.  Coagulation:   Recent Labs  Lab 07/24/17  0854   INR 1.1   APTT 24.5     Platelet Aggregation Study: No results for input(s): PLTAGG, PLTAGINTERP, PLTAGREGLACO, ADPPLTAGGREG in the last 168 hours.  Hgb A1C:   Recent Labs  Lab 07/24/17  1137   HGBA1C 5.0     TSH:   Recent Labs  Lab 07/24/17  1137   TSH 1.273       Diagnostic Results:  Brain Imaging: CT Head. Date: 07/24/17  Acute Pathology: ICH  Location: [place]: left  Old Vascular Pathology: ICH/microbleeds  Location: [place]: right              Caitlyn Carson PA-C  [facility] Stroke Center  Department of Vascular Neurology   [facility]

## 2017-07-24 NOTE — SUBJECTIVE & OBJECTIVE
Past Medical History:   Diagnosis Date    Amputation stump pain 9/10/2013    Aspiration pneumonia 7/27/2015    Asterixis 11/8/2016    C. difficile colitis 8/7/2015    Cholelithiasis without obstruction 8/25/2015    Chronic diastolic heart failure     2-23-17   1 - Low normal to mildly depressed left ventricular systolic function (EF 50-55%).    2 - Right ventricular enlargement with mildly depressed systolic function.    3 - Left ventricular diastolic dysfunction.    4 - Right atrial enlargement.    5 - Severe tricuspid regurgitation.    6 - Pulmonary hypertension. The estimated PA systolic pressure is 86 mmHg.    7 - Increased central venous pressure.    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    Metabolic acidosis, IAG, reduced excretion of inorganic acids     Myoclonic jerking 9/20/2016    Secondary hyperparathyroidism (of renal origin)     Secondary pulmonary hypertension 3/23/2017    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    TB lung, latent 08/25/2015    Negative Quantiferon Gold 3-23-17     Past Surgical History:   Procedure Laterality Date    COLONOSCOPY      COLONOSCOPY N/A 4/4/2017    Procedure: COLONOSCOPY;  Surgeon: Walker Stern MD;  Location: [facility] ([address] [place] [address]);  Service: Endoscopy;  Laterality: N/A;  PA Systolic Pressure 85.56. HD Patient MWF, K+ lab prior to procedure.     FOOT AMPUTATION THROUGH METATARSAL      left foot    LEG AMPUTATION THROUGH KNEE  12/18/2013     left BKA    R AVF  9/12/12      No current facility-administered medications on file prior to encounter.      Current Outpatient Prescriptions on File Prior to Encounter   Medication Sig Dispense Refill    acetaminophen (TYLENOL) 500 MG tablet Take 1 tablet (500 mg total) by mouth every 6 (six) hours as needed for Pain.  0    amlodipine (NORVASC) 10 MG tablet Take 1 tablet (10 mg total) by mouth every morning. 90 tablet 4    atorvastatin (LIPITOR) 40 MG tablet Take 1 tablet (40 mg total) by mouth once daily. 90 tablet 4    calcium carbonate (OS-FERNANDA) 500 mg calcium (1,250 mg) chewable tablet Take 1 tablet (500 mg total) by mouth 3 (three) times daily with meals.      carvedilol (COREG) 25 MG tablet Take 1 tablet (25 mg total) by mouth 2 (two) times daily with meals. 180 tablet 4    chlorproMAZINE (THORAZINE) 25 MG tablet Take 1 tablet (25 mg total) by mouth 3 (three) times daily as needed (Hiccups). 90 tablet 11    cinacalcet (SENSIPAR) 60 MG Tab Take 1 tablet (60 mg total) by mouth daily with breakfast. 30 tablet 3    hydrALAZINE (APRESOLINE) 50 MG tablet Take 1 tablet (50 mg total) by mouth every 8 (eight) hours as needed (systolic blood pressure (top number) > 180). 90 tablet 4    lisinopril (PRINIVIL,ZESTRIL) 40 MG tablet Take 1 tablet (40 mg total) by mouth every evening. 90 tablet 4    omeprazole (PRILOSEC) 40 MG capsule Take 1 capsule (40 mg total) by mouth 2 (two) times daily before meals. 90 capsule 3    ondansetron (ZOFRAN) 8 MG tablet Take 1 tablet (8 mg total) by mouth every 8 (eight) hours as needed for Nausea. 90 tablet 4    ondansetron (ZOFRAN-ODT) 8 MG TbDL Take 1 tablet (8 mg total) by mouth every 8 (eight) hours as needed. 10 tablet 0      Allergies: Fosrenol [lanthanum]    Family History   Problem Relation Age of Onset    Early death Mother     Kidney disease Father     Hypertension Father     Heart disease Father     Hyperlipidemia Father     Diabetes Brother     Alcohol  abuse Maternal Grandmother     Diabetes Maternal Grandfather     Melanoma Neg Hx      Social History   Substance Use Topics    Smoking status: Former Smoker     Packs/day: 1.00     Years: 10.00    Smokeless tobacco: Never Used    Alcohol use No     Review of Systems   Respiratory: Negative for apnea, cough, choking, chest tightness and shortness of breath.    Cardiovascular: Negative for chest pain and leg swelling.   Gastrointestinal: Positive for abdominal pain, nausea and vomiting.   Psychiatric/Behavioral: Negative for agitation.     Objective:     Vitals:  Temp: 98.1 °F (36.7 °C) (07/24/17 0804)  Pulse: 99 (07/24/17 1200)  Resp: 20 (07/24/17 1200)  BP: 138/77 (07/24/17 1200)  SpO2: 95 % (07/24/17 0930)    Temp:  [97.9 °F (36.6 °C)-98.1 °F (36.7 °C)] 98.1 °F (36.7 °C)  Pulse:  [] 99  Resp:  [12-23] 20  SpO2:  [95 %-100 %] 95 %  BP: (118-191)/() 138/77              No intake/output data recorded.    Physical Exam   Cardiovascular: Normal rate, regular rhythm, normal heart sounds and intact distal pulses.    Pulmonary/Chest: Effort normal and breath sounds normal.   Abdominal: Soft. Bowel sounds are normal.   Neurological:   E4 V5 M6  AAo  PERRLA, EOMI 4mm/4mm  Right facial weakness  FLORES, follows commands  Mild right hemiparesis/drift   Skin: Skin is warm and dry.   Vitals reviewed.        Today I personally reviewed pertinent medications, lines/drains/airways, imaging, cardiology, lab results, microbiology results,

## 2017-07-24 NOTE — ED PROVIDER NOTES
Encounter Date: 7/24/2017       History     Chief Complaint   Patient presents with    Altered Mental Status     Pt with AMS beginning this AM     HPI   History of end-stage renal disease presents as a transfer from outside facility for left thalamic intraparenchymal hemorrhage.  systolic at OSH triage.     He has a history of spontaneous intraparenchymal hemorrhage.  This morning he was noted to be altered by family member called 911.  He has no known or witnessed falls, however he did tell provider at the outside hospital that he had lost his balance while getting ready for dialysis. His mental status has been waxing and waning based on conversation with EMS and chart review which suggests varying periods of lucidity and communicative abilities.    At this time although he is nonverbal the patient nods his head that he knows why he is here, and shakes his head to denote that he is not feeling any pain including HA, and no nausea or focal weakness or numbness.    Placed on cardene gtt and transferred to our facility for NSGY evaluation and ICU level care.      Review of patient's allergies indicates:   Allergen Reactions    Fosrenol [lanthanum] Nausea And Vomiting     Nausea and vomiting     Past Medical History:   Diagnosis Date    Amputation stump pain 9/10/2013    Aspiration pneumonia 7/27/2015    Asterixis 11/8/2016    C. difficile colitis 8/7/2015    Cholelithiasis without obstruction 8/25/2015    Chronic diastolic heart failure     2-23-17   1 - Low normal to mildly depressed left ventricular systolic function (EF 50-55%).    2 - Right ventricular enlargement with mildly depressed systolic function.    3 - Left ventricular diastolic dysfunction.    4 - Right atrial enlargement.    5 - Severe tricuspid regurgitation.    6 - Pulmonary hypertension. The estimated PA systolic pressure is 86 mmHg.    7 - Increased central venous pressure.    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    Metabolic acidosis, IAG, reduced excretion of inorganic acids     Myoclonic jerking 9/20/2016    Secondary hyperparathyroidism (of renal origin)     Secondary pulmonary hypertension 3/23/2017    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    TB lung, latent 08/25/2015    Negative Quantiferon Gold 3-23-17     Past Surgical History:   Procedure Laterality Date    COLONOSCOPY      COLONOSCOPY N/A 4/4/2017    Procedure: COLONOSCOPY;  Surgeon: Walker Stern MD;  Location: [facility] ([address] [place] [address]);  Service: Endoscopy;  Laterality: N/A;  PA Systolic Pressure 85.56. HD Patient MWF, K+ lab prior to procedure.     FOOT AMPUTATION THROUGH METATARSAL      left foot    LEG AMPUTATION THROUGH KNEE  12/18/2013    left BKA    R AVF  9/12/12     Family History   Problem Relation Age of Onset    Early death Mother     Kidney disease Father     Hypertension Father     Heart disease Father     Hyperlipidemia Father     Diabetes Brother     Alcohol abuse Maternal Grandmother     Diabetes Maternal Grandfather     Melanoma Neg Hx      Social History   Substance Use Topics    Smoking status: Former Smoker     Packs/day: 1.00     Years: 10.00    Smokeless tobacco: Never Used    Alcohol use No     Review of Systems   Unable to perform ROS: Mental status change       Physical Exam     Initial Vitals   BP Pulse Resp Temp SpO2   07/24/17 0645 07/24/17 0645 07/24/17 0645 07/24/17 0648 07/24/17 0645   (!) 169/81 100 16 97.9 °F  (36.6 °C) 100 %      MAP       07/24/17 0645       110.33         Physical Exam    Nursing note and vitals reviewed.  Constitutional: He appears well-developed and well-nourished. He is not diaphoretic. No distress.   HENT:   Head: Normocephalic and atraumatic.   Mouth/Throat: Oropharynx is clear and moist.   Eyes: Conjunctivae are normal. Pupils are equal, round, and reactive to light. No scleral icterus.   Pupils 3mm OU   Neck: Normal range of motion. No tracheal deviation present. No JVD present.   Cardiovascular: Normal rate and regular rhythm. Exam reveals no gallop and no friction rub.    No murmur heard.  Pulmonary/Chest: Breath sounds normal. No stridor. No respiratory distress. He has no wheezes. He has no rales.   Abdominal: Soft. He exhibits no distension. There is no tenderness. There is no rebound.   Musculoskeletal: Normal range of motion. He exhibits no edema.   Neurological:   Inattentive at times, nonverbal, follows most complex commands appropriately. MAEW, no pronator drift or facial droop. Exam limited by patient's inattentiveness, and expressive aphasia   Skin: Skin is warm and dry. No pallor.         ED Course   Procedures  Labs Reviewed   CBC W/ AUTO DIFFERENTIAL - Abnormal; Notable for the following:        Result Value    RBC 3.88 (*)     Hemoglobin 12.7 (*)     Hematocrit 37.4 (*)     MCH 32.7 (*)     Lymph% 17.7 (*)     All other components within normal limits   BASIC METABOLIC PANEL - Abnormal; Notable for the following:     Potassium 5.2 (*)     Chloride 79 (*)     CO2 40 (*)     Glucose 130 (*)     BUN, Bld 37 (*)     Creatinine 11.7 (*)     Calcium 11.7 (*)     Anion Gap 22 (*)     eGFR if  5 (*)     eGFR if non  4 (*)     All other components within normal limits   POCT GLUCOSE - Abnormal; Notable for the following:     POCT Glucose 135 (*)     All other components within normal limits   ISTAT PROCEDURE - Abnormal; Notable for the following:     POC PH  7.517 (*)     POC PCO2 63.6 (*)     POC PO2 38 (*)     POC HCO3 51.6 (*)     POC SATURATED O2 75 (*)     All other components within normal limits   TYPE & SCREEN                   APC / Resident Notes:   HO4 MDM:  53yo M transfer for hemorrhagic stroke involving left thalamus.  Protecting airway, waxing and waning attentiveness/arousal, aphasic but no focal deficits otherwise  At goal SBP<160 on cardene 5mg/hr  Not on any anticoagulation, PLT >100, BUN <40  Consulted neurosurgery and neuro ICU for continued care.      Rush Toledo  [employer] EM PGY4  7/24/2017 8:31 AM                    ED Course     Clinical Impression:   The primary encounter diagnosis was Intraparenchymal hemorrhage of brain. Diagnoses of Altered mental state and ESRD (end stage renal disease) on dialysis were also pertinent to this visit.    Disposition:   Disposition: Admitted  Condition: Critical         Rush Toledo MD  Resident  07/24/17 3885       Rush Toledo MD  Resident  07/24/17 9936

## 2017-07-24 NOTE — ED NOTES
APPEARANCE: awake and alert in NAD.  SKIN: warm, dry and intact. No breakdown or bruising. Fistula to right upper arm   MUSCULOSKELETAL: Patient moving all extremities spontaneously. LBK amputation.   RESPIRATORY: no shortness of breath. All breath sounds CTA bilaterally.  CARDIAC: heart tones normal. Regular rate and rhythm; 2+ distal pulses; no peripheral edema  ABDOMEN: S/ND/NT, normoactive bowel sounds present in all four quadrants. Normal stool pattern.  : voids spontaneously without difficulty.  Neurologic: AAO x 2; follows commands; denies numbness/tingling.

## 2017-07-24 NOTE — PLAN OF CARE
Problem: Occupational Therapy Goal  Goal: Occupational Therapy Goal  Goals set 7/24 to be addressed for 14 days with expiration date 8/7:   Patient will increase functional independence with ADLs by performing:    Patient will demonstrate rolling to the right with modified independence  Patient will demonstrate rolling to the left with modified independence  Patient will demonstrate supine to sit with SBA  Patient will demonstrate stand-pivot transfers with min A  Patient will demonstrate upper body dressing with min A while seated EOB  Patient will demonstrate lower body dressing with min A while seated EOB  Patient will demonstrate grooming while standing mod A  Patient will demonstrate toileting with mod A for managing clothes and hygiene  Patient will demonstrate bathing while seated EOB with mod A  Patient's family/caregiver will demonstrate independence and safety with assisting patient with self-care and functional mobility.  Patient and or patient's family will verbalize understanding of stroke prevention guidelines, personal risk factors and stroke warning signs via teachback method.     Outcome: Ongoing (interventions implemented as appropriate)  OT Eval completed and goals set on 7/24    LILI Andrade  7/24/2017

## 2017-07-24 NOTE — SUBJECTIVE & OBJECTIVE
Past Medical History:   Diagnosis Date    Amputation stump pain 9/10/2013    Aspiration pneumonia 7/27/2015    Asterixis 11/8/2016    C. difficile colitis 8/7/2015    Cholelithiasis without obstruction 8/25/2015    Chronic diastolic heart failure     2-23-17   1 - Low normal to mildly depressed left ventricular systolic function (EF 50-55%).    2 - Right ventricular enlargement with mildly depressed systolic function.    3 - Left ventricular diastolic dysfunction.    4 - Right atrial enlargement.    5 - Severe tricuspid regurgitation.    6 - Pulmonary hypertension. The estimated PA systolic pressure is 86 mmHg.    7 - Increased central venous pressure.    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    Metabolic acidosis, IAG, reduced excretion of inorganic acids     Myoclonic jerking 9/20/2016    Secondary hyperparathyroidism (of renal origin)     Secondary pulmonary hypertension 3/23/2017    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    TB lung, latent 08/25/2015    Negative Quantiferon Gold 3-23-17       Past Surgical History:   Procedure Laterality Date    COLONOSCOPY      COLONOSCOPY N/A 4/4/2017    Procedure: COLONOSCOPY;  Surgeon: Walker Stern MD;  Location: [facility] ([address] [place] [address]);  Service: Endoscopy;  Laterality: N/A;  PA Systolic Pressure 85.56. HD Patient MWF, K+ lab prior to procedure.     FOOT AMPUTATION THROUGH METATARSAL      left foot    LEG AMPUTATION THROUGH KNEE  12/18/2013     left BKA    R AVF  9/12/12       Review of patient's allergies indicates:   Allergen Reactions    Fosrenol [lanthanum] Nausea And Vomiting     Nausea and vomiting     Current Facility-Administered Medications   Medication Frequency    0.9%  NaCl infusion Continuous    acetaminophen tablet 650 mg Q6H PRN    labetalol injection 10 mg Q4H PRN    niCARdipine 40 mg/200 mL infusion Continuous    polyethylene glycol packet 17 g Daily    senna-docusate 8.6-50 mg per tablet 1 tablet BID    sodium chloride 0.9% flush 3 mL Q8H     Current Outpatient Prescriptions   Medication    acetaminophen (TYLENOL) 500 MG tablet    amlodipine (NORVASC) 10 MG tablet    atorvastatin (LIPITOR) 40 MG tablet    calcium carbonate (OS-FERNANDA) 500 mg calcium (1,250 mg) chewable tablet    carvedilol (COREG) 25 MG tablet    chlorproMAZINE (THORAZINE) 25 MG tablet    cinacalcet (SENSIPAR) 60 MG Tab    hydrALAZINE (APRESOLINE) 50 MG tablet    lisinopril (PRINIVIL,ZESTRIL) 40 MG tablet    omeprazole (PRILOSEC) 40 MG capsule    ondansetron (ZOFRAN) 8 MG tablet    ondansetron (ZOFRAN-ODT) 8 MG TbDL     Family History     Problem Relation (Age of Onset)    Alcohol abuse Maternal Grandmother    Diabetes Brother, Maternal Grandfather    Early death Mother    Heart disease Father    Hyperlipidemia Father    Hypertension Father    Kidney disease Father        Social History Main Topics    Smoking status: Former Smoker     Packs/day: 1.00     Years: 10.00    Smokeless tobacco: Never Used    Alcohol use No    Drug use: No    Sexual activity: Not on file     Review of Systems   Constitutional: Positive for activity change. Negative for appetite change, fatigue and fever.   HENT: Negative for ear pain, facial swelling, sore throat, trouble swallowing and voice change.    Respiratory: Negative for cough, chest tightness, shortness of breath and wheezing.    Cardiovascular: Negative for chest pain, palpitations and leg swelling.    Gastrointestinal: Negative for abdominal pain, diarrhea, nausea and vomiting.   Genitourinary: Negative for flank pain.   Musculoskeletal: Negative for back pain, joint swelling, neck pain and neck stiffness.   Skin: Negative for color change, rash and wound.   Neurological: Positive for weakness. Negative for seizures, facial asymmetry and numbness.   Psychiatric/Behavioral: Positive for confusion.     Objective:     Vital Signs (Most Recent):  Temp: 98.1 °F (36.7 °C) (07/24/17 0804)  Pulse: 99 (07/24/17 1200)  Resp: 20 (07/24/17 1200)  BP: 138/77 (07/24/17 1200)  SpO2: 95 % (07/24/17 0930)  O2 Device (Oxygen Therapy): room air (07/24/17 0648) Vital Signs (24h Range):  Temp:  [97.9 °F (36.6 °C)-98.1 °F (36.7 °C)] 98.1 °F (36.7 °C)  Pulse:  [] 99  Resp:  [12-23] 20  SpO2:  [95 %-100 %] 95 %  BP: (118-191)/() 138/77     Weight: 78.5 kg (173 lb) (07/24/17 0648)  Body mass index is 27.92 kg/m².  Body surface area is 1.91 meters squared.    No intake/output data recorded.    Physical Exam   Constitutional: He appears well-developed and well-nourished. He is active. He has a sickly appearance. Nasal cannula in place.   HENT:   Head: Normocephalic.   Right Ear: External ear normal.   Left Ear: External ear normal.   Nose: Nose normal.   Mouth/Throat: Mucous membranes are dry.   Eyes: Conjunctivae and EOM are normal. Pupils are equal, round, and reactive to light.   Neck: No hepatojugular reflux and no JVD present. Carotid bruit is not present.   Cardiovascular: Regular rhythm and normal heart sounds.  Tachycardia present.    No murmur heard.  Pulmonary/Chest: No stridor. He has rales in the right lower field and the left lower field.   Abdominal: Soft. Bowel sounds are normal. He exhibits no shifting dullness, no distension and no mass. There is no tenderness. No hernia.   Musculoskeletal:   Left leg bellow Knee amputation, Right leg no edma   Neurological: He is alert.   Psychiatric: His speech is delayed.        Significant Labs:  ABGs:   Recent Labs  Lab 07/24/17  0714   PH 7.517*   PCO2 63.6*   HCO3 51.6*   POCSATURATED 75*   BE 29     Cardiac Markers: No results for input(s): CKMB, TROPONINT, MYOGLOBIN in the last 168 hours.  CBC:   Recent Labs  Lab 07/24/17  0647   WBC 8.65   RBC 3.88*   HGB 12.7*   HCT 37.4*      MCV 96   MCH 32.7*   MCHC 34.0     CMP:   Recent Labs  Lab 07/24/17  0647   *   CALCIUM 11.7*      K 5.2*   CO2 40*   CL 79*   BUN 37*   CREATININE 11.7*     Coagulation:   Recent Labs  Lab 07/24/17  0854   INR 1.1   APTT 24.5     Microbiology Results (last 7 days)     ** No results found for the last 168 hours. **        All labs within the past 24 hours have been reviewed.

## 2017-07-24 NOTE — PLAN OF CARE
Problem: Patient Care Overview  Goal: Plan of Care Review  Outcome: Ongoing (interventions implemented as appropriate)  POC reviewed with pt and family at 1400. Pt verbalized understanding. Questions and concerns addressed. Nephrology on board to discuss possible dialysis tomorrow. Pt progressing toward goals. Will continue to monitor. See flowsheets for full assessment and VS info.

## 2017-07-24 NOTE — ASSESSMENT & PLAN NOTE
Vaughn Retana is a 52 y.o. male with history of multiple subcortical ICH presents to hospital with dizziness, RSW, and N/V and found to have a L thalamic ICH    Antiplatelets: none, ICH  Statins: none, ICH  SBP <140  DVT ppx: SCDs  PT/OT and speech to evaluate and treat  Neuro checks q1h    Recommend MRI, CTA, echo, lipid panel, A1C, and TSH

## 2017-07-24 NOTE — CONSULTS
PM&R consult received.    Reason for consult:  assess rehabilitation needs    Reviewed patient history and current admission.  Rehab team following.  Full consult to follow.    VANITA Gonzalez, FNP-C  Physical Medicine & Rehabilitation   07/24/2017  Spectralink: [id number]

## 2017-07-24 NOTE — ED NOTES
Pt. Resting in bed in NAD. RR e/u. Continuous cardiac, BP, and O2 monitoring in progress. VS being monitoring continuously per MD orders. Pt. Offered bathroom assistance and denies need at this time. Explanation of care/wait provided. Bed in low, locked position with rails up and call bell in reach. Will continue to monitor.     Referral was sent from Dr. Frederick to schedule patient for an Colonoscopy Screening. Tried contacting patient in regards to scheduling an OV due to being on Plavix, patient phone was busy.

## 2017-07-24 NOTE — ED TRIAGE NOTES
Pt presents to ER via EMS w/ reports of generalized abdominal pains and nausea x 1 week. Last dialysis reported on Friday regular schedule (M/W/F). Denies chest pain, SOB, weakness, numbness/tinlging, dizziness, blurred vision, facial droop, slurred speech or muscle cramping at this time.

## 2017-07-24 NOTE — H&P
[facility]  Neurocritical Care  History & Physical    Admit Date: 7/24/2017  Service Date: 07/24/2017  Length of Stay: 0    Subjective:     Chief Complaint: <principal problem not specified>    History of Present Illness: 51 yo male with PMhx significant for ESRD on dialysis, previous ICH, HTN, and T2DM.  Pt presented to [facility] this morning after being found down.  Pt lives at home with sister, he was found down and slightly confused.  Upon arrival to the ED he was hypertensive in the 190's.  HCT revealed acute left thalamic ICH, neurosurgery consulted for evaluation.  Pt currently c/o right sided weakness, and some nausea.  Carroll HAs, visual changes, seizures.  No reported use of anticoagulants at home.       Past Medical History:   Diagnosis Date    Amputation stump pain 9/10/2013    Aspiration pneumonia 7/27/2015    Asterixis 11/8/2016    C. difficile colitis 8/7/2015    Cholelithiasis without obstruction 8/25/2015    Chronic diastolic heart failure     2-23-17   1 - Low normal to mildly depressed left ventricular systolic function (EF 50-55%).    2 - Right ventricular enlargement with mildly depressed systolic function.    3 - Left ventricular diastolic dysfunction.    4 - Right atrial enlargement.    5 - Severe tricuspid regurgitation.    6 - Pulmonary hypertension. The estimated PA systolic pressure is 86 mmHg.    7 - Increased central venous pressure.     Chronic low back pain 12/1/2015    Closed head injury 9/8/2016    Diabetic neuropathy     ESRD on hemodialysis 2/7/2013    MWF at [facility]    HCV antibody positive     Normal LFT as of 3/2017    Hemiparesis affecting left side as late effect of stroke 11/08/2016    History of Intracerebral Hemorrhage: L BG 5/2013; R BG 9/2016; R BG 11/2016; L caudate head 2/2017 11/2/2016    Hypertension     left basal ganglia ICH 5/2013 11/2/2016    Left Caudate Head ICH 2/22/2017 2/24/2017    Malignant hypertension with heart  failure and ESRD 8/1/2015    Metabolic acidosis, IAG, reduced excretion of inorganic acids     Myoclonic jerking 9/20/2016    Secondary hyperparathyroidism (of renal origin)     Secondary pulmonary hypertension 3/23/2017    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    TB lung, latent 08/25/2015    Negative Quantiferon Gold 3-23-17     Past Surgical History:   Procedure Laterality Date    COLONOSCOPY      COLONOSCOPY N/A 4/4/2017    Procedure: COLONOSCOPY;  Surgeon: Walker Stern MD;  Location: [facility] ([address] [place] [address]);  Service: Endoscopy;  Laterality: N/A;  PA Systolic Pressure 85.56. HD Patient MWF, K+ lab prior to procedure.     FOOT AMPUTATION THROUGH METATARSAL      left foot    LEG AMPUTATION THROUGH KNEE  12/18/2013    left BKA    R AVF  9/12/12      No current facility-administered medications on file prior to encounter.      Current Outpatient Prescriptions on File Prior to Encounter   Medication Sig Dispense Refill    acetaminophen (TYLENOL) 500 MG tablet Take 1 tablet (500 mg total) by mouth every 6 (six) hours as needed for Pain.  0    amlodipine (NORVASC) 10 MG tablet Take 1 tablet (10 mg total) by mouth every morning. 90 tablet 4    atorvastatin (LIPITOR) 40 MG tablet Take 1 tablet (40 mg total) by mouth once daily. 90 tablet 4    calcium carbonate (OS-FERNANDA) 500 mg calcium (1,250 mg) chewable tablet Take 1 tablet (500 mg total) by mouth 3 (three) times daily with meals.      carvedilol (COREG) 25 MG tablet Take 1 tablet (25 mg total) by mouth 2 (two) times daily with meals. 180 tablet 4    chlorproMAZINE (THORAZINE) 25 MG tablet Take 1 tablet (25 mg total) by mouth 3 (three) times daily as needed (Hiccups). 90 tablet 11    cinacalcet (SENSIPAR) 60 MG Tab Take 1 tablet (60 mg total) by mouth daily with breakfast. 30 tablet 3    hydrALAZINE (APRESOLINE) 50 MG tablet Take 1 tablet (50 mg total) by mouth every 8 (eight) hours as needed (systolic blood pressure (top number) > 180). 90  tablet 4    lisinopril (PRINIVIL,ZESTRIL) 40 MG tablet Take 1 tablet (40 mg total) by mouth every evening. 90 tablet 4    omeprazole (PRILOSEC) 40 MG capsule Take 1 capsule (40 mg total) by mouth 2 (two) times daily before meals. 90 capsule 3    ondansetron (ZOFRAN) 8 MG tablet Take 1 tablet (8 mg total) by mouth every 8 (eight) hours as needed for Nausea. 90 tablet 4    ondansetron (ZOFRAN-ODT) 8 MG TbDL Take 1 tablet (8 mg total) by mouth every 8 (eight) hours as needed. 10 tablet 0      Allergies: Fosrenol [lanthanum]    Family History   Problem Relation Age of Onset    Early death Mother     Kidney disease Father     Hypertension Father     Heart disease Father     Hyperlipidemia Father     Diabetes Brother     Alcohol abuse Maternal Grandmother     Diabetes Maternal Grandfather     Melanoma Neg Hx      Social History   Substance Use Topics    Smoking status: Former Smoker     Packs/day: 1.00     Years: 10.00    Smokeless tobacco: Never Used    Alcohol use No     Review of Systems   Respiratory: Negative for apnea, cough, choking, chest tightness and shortness of breath.    Cardiovascular: Negative for chest pain and leg swelling.   Gastrointestinal: Positive for abdominal pain, nausea and vomiting.   Psychiatric/Behavioral: Negative for agitation.     Objective:     Vitals:  Temp: 98.1 °F (36.7 °C) (07/24/17 0804)  Pulse: 99 (07/24/17 1200)  Resp: 20 (07/24/17 1200)  BP: 138/77 (07/24/17 1200)  SpO2: 95 % (07/24/17 0930)    Temp:  [97.9 °F (36.6 °C)-98.1 °F (36.7 °C)] 98.1 °F (36.7 °C)  Pulse:  [] 99  Resp:  [12-23] 20  SpO2:  [95 %-100 %] 95 %  BP: (118-191)/() 138/77              No intake/output data recorded.    Physical Exam   Cardiovascular: Normal rate, regular rhythm, normal heart sounds and intact distal pulses.    Pulmonary/Chest: Effort normal and breath sounds normal.   Abdominal: Soft. Bowel sounds are normal.   Neurological:   E4 V5 M6  AAo  PERRLA, EOMI 4mm/4mm  Right  facial weakness  FLORES, follows commands  Mild right hemiparesis/drift   Skin: Skin is warm and dry.   Vitals reviewed.        Today I personally reviewed pertinent medications, lines/drains/airways, imaging, cardiology, lab results, microbiology results,       Assessment/Plan:     Neuro   Intraparenchymal hemorrhage of brain    Bilateral IPH, Left >Right,   NSGY consulted  Vascular Neurology consulted  Believe d/t hypertension  Repeat CTH, no angio d/t renal failure and location specific for HTN hemorrhage,   Coags WNL  No antiplatelet/coagualtion HX  SBP <140, Neuro Checks q1hr  PT/OT/SLP          Cardiac   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    SBP <140  EKG/Echo pending  HOme meds resumed  PRN labetolol/hydralazine and cardene          Renal   ESRD on hemodialysis    Last received dialysis on 7/21  Nephrology consulted  Plans for HD, Na goal 140-145        Type 2 diabetes mellitus with chronic kidney disease on chronic dialysis    ISS, Q6hr Accuchecks  HgbA1c pending            Prophylaxis:  Venous Thromboembolism: mechanical  Stress Ulcer: PPI  Ventilator Pneumonia: not applicable     Activity Orders          None        Full Code   Critical Care: 36 min    Wade Burnett NP  Neurocritical Care  [facility]

## 2017-07-24 NOTE — ED NOTES
Dr. Navarro aware of current /100 awaiting further orders at this time. Will continue to monitor.

## 2017-07-24 NOTE — SUBJECTIVE & OBJECTIVE
Past Medical History:   Diagnosis Date    Amputation stump pain 9/10/2013    Aspiration pneumonia 7/27/2015    Asterixis 11/8/2016    C. difficile colitis 8/7/2015    Cholelithiasis without obstruction 8/25/2015    Chronic diastolic heart failure     2-23-17   1 - Low normal to mildly depressed left ventricular systolic function (EF 50-55%).    2 - Right ventricular enlargement with mildly depressed systolic function.    3 - Left ventricular diastolic dysfunction.    4 - Right atrial enlargement.    5 - Severe tricuspid regurgitation.    6 - Pulmonary hypertension. The estimated PA systolic pressure is 86 mmHg.    7 - Increased central venous pressure.     Chronic low back pain 12/1/2015    Closed head injury 9/8/2016    Diabetic neuropathy     ESRD on hemodialysis 2/7/2013    MWF at [facility]   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    Metabolic acidosis, IAG, reduced excretion of inorganic acids     Myoclonic jerking 9/20/2016    Secondary hyperparathyroidism (of renal origin)     Secondary pulmonary hypertension 3/23/2017    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    TB lung, latent 08/25/2015    Negative Quantiferon Gold 3-23-17     Past Surgical History:   Procedure Laterality Date    COLONOSCOPY      COLONOSCOPY N/A 4/4/2017    Procedure: COLONOSCOPY;  Surgeon: Walker Stern MD;  Location: [facility] ([address] [place] [address]);  Service: Endoscopy;  Laterality: N/A;  PA Systolic Pressure 85.56. HD Patient MWF, K+ lab prior to procedure.     FOOT AMPUTATION THROUGH METATARSAL      left foot    LEG AMPUTATION THROUGH KNEE   12/18/2013    left BKA    R AVF  9/12/12     Family History   Problem Relation Age of Onset    Early death Mother     Kidney disease Father     Hypertension Father     Heart disease Father     Hyperlipidemia Father     Diabetes Brother     Alcohol abuse Maternal Grandmother     Diabetes Maternal Grandfather     Melanoma Neg Hx      Social History   Substance Use Topics    Smoking status: Former Smoker     Packs/day: 1.00     Years: 10.00    Smokeless tobacco: Never Used    Alcohol use No     Review of patient's allergies indicates:   Allergen Reactions    Fosrenol [lanthanum] Nausea And Vomiting     Nausea and vomiting     Medications: I have reviewed the current medication administration record.      (Not in a hospital admission)    Review of Systems   Constitutional: Negative for chills and fever.   HENT: Negative for drooling.    Eyes: Negative for visual disturbance.   Respiratory: Negative for cough and shortness of breath.    Cardiovascular: Negative for chest pain and palpitations.   Gastrointestinal: Positive for nausea and vomiting.   Skin: Negative for rash.   Neurological: Positive for speech difficulty and weakness. Negative for headaches.   Psychiatric/Behavioral: Positive for confusion. Negative for agitation.     Objective:     Vital Signs (Most Recent):  Temp: 98.1 °F (36.7 °C) (07/24/17 0804)  Pulse: 99 (07/24/17 1200)  Resp: 20 (07/24/17 1200)  BP: 138/77 (07/24/17 1200)  SpO2: 95 % (07/24/17 0930)    Vital Signs Range (Last 24H):  Temp:  [97.9 °F (36.6 °C)-98.1 °F (36.7 °C)]   Pulse:  []   Resp:  [12-23]   BP: (118-191)/()   SpO2:  [95 %-100 %]     Physical Exam   Constitutional: He is oriented to person, place, and time. He appears well-developed and well-nourished. No distress.   HENT:   Head: Normocephalic and atraumatic.   Eyes: EOM are normal. Pupils are equal, round, and reactive to light.   Cardiovascular: Tachycardia present.    Pulmonary/Chest: Effort normal. No  respiratory distress.   Abdominal: Soft.   Neurological: He is alert and oriented to person, place, and time. GCS eye subscore is 4 - spontaneous. GCS verbal subscore is 4 - confused. GCS motor subscore is 6 - obeys commands.   Skin: Skin is warm and dry.   Vitals reviewed.      Neurological Exam:   LOC: alert and follows requests  Language: No aphasia  Speech: No dysarthria  Orientation: Person, Place, Time  Memory: Recent memory intact, Remote memory intact, Age correct, Month correct  Visual Fields (CN II): Full  EOM (CN III, IV, VI): Full/intact  Pupils (CN III, IV, VI): PERRL  Facial Sensation (CN V): Symmetric  Facial Movement (CN VII): symmetric facial expression  Hearing (CN VIII): intact bilaterally  Motor*: Arm Left:  Normal (5/5), Leg Left:   Normal (5/5), Arm Right:   Paretic:  4/5, Leg Right:   Paretic:  4/5  Cerebellar*: Normal limb, Normal gait  , Normal stance  Sensation: intact to light touch, temperature and vibration  Tone: Arm-Left: normal; Leg-Left: normal; Arm-Right: normal; Leg-Right: normal    NIH Stroke Scale:  Interval: baseline (upon arrival/admit)  Level of Consciousness: 0 - alert  LOC Questions: 1 - answers one correctly  LOC Commands: 0 - performs both correctly  Best Gaze: 0 - normal  Visual: 0 - no visual loss  Facial Palsy: 0 - normal  Motor Left Arm: 0 - no drift  Motor Right Arm: 1 - drift  Motor Left Le - no drift  Motor Right Le - drift  Limb Ataxia: 0 - absent  Sensory: 0 - normal  Best Language: 0 - no aphasia  Dysarthria: 1 - mild to moderate dysarthria  Extinction and Inattention: 0 - no neglect  NIH Stroke Scale Total: 4  Milo Coma Scale:  Best Eye Response: 4 - spontaneous  Best Motor Response: 6 - obeys commands  Best Verbal Response: 4 - confused  Milo Coma Scale Total: 14  Modified Sanders Scale:   Timeline: Prior to symptoms onset  Modified Kary Score: 0 - no symptoms    ICH Scale:   Milo Coma Score: 0 - 13-15  Age > or = 80: 0 - no  ICH Volume > or =  30 mL: 0 - no  Intraventricular Hemorrhage: 0 - no  Infratentorial Origin of Hemorrhage: 0 - no  ICH Scale Total: 0      Laboratory:  CMP:   Recent Labs  Lab 07/24/17  0647   CALCIUM 11.7*      K 5.2*   CO2 40*   CL 79*   BUN 37*   CREATININE 11.7*     CBC:   Recent Labs  Lab 07/24/17  0647   WBC 8.65   RBC 3.88*   HGB 12.7*   HCT 37.4*      MCV 96   MCH 32.7*   MCHC 34.0     Lipid Panel: No results for input(s): CHOL, LDLCALC, HDL, TRIG in the last 168 hours.  Coagulation:   Recent Labs  Lab 07/24/17  0854   INR 1.1   APTT 24.5     Platelet Aggregation Study: No results for input(s): PLTAGG, PLTAGINTERP, PLTAGREGLACO, ADPPLTAGGREG in the last 168 hours.  Hgb A1C:   Recent Labs  Lab 07/24/17  1137   HGBA1C 5.0     TSH:   Recent Labs  Lab 07/24/17  1137   TSH 1.273       Diagnostic Results:  Brain Imaging: CT Head. Date: 07/24/17  Acute Pathology: ICH  Location: [place]: left  Old Vascular Pathology: ICH/microbleeds  Location: [place]: right

## 2017-07-24 NOTE — ED NOTES
Suzanna in transfer center states nurse to call report to [facility] ED, states [facility] transport in route to facility.

## 2017-07-24 NOTE — ASSESSMENT & PLAN NOTE
53 yo male with small acute left thalamic hemorrhage, likely hypertensive etiology  - Pt is neurologically stable, mild confusion and right sided weakness  - No acute neurosurgical interventions indicated   - Platelets / Coags are wnl  - Monitor in neuro ICU overnight for strict BP control < 160, q 1 hour neuro checks  - Repeat HCT in AM  - Will continue to follow, please call with questions

## 2017-07-24 NOTE — ASSESSMENT & PLAN NOTE
A 52 year old  male, he dialyzes MWF and [facility] under the care of Dr. Hubbard.  His EDW is 73 kg.  Treatment duration of 3:45 minutes.  He has AVF to [facility]. Arrived due to ICH on CT without contrast, patient doesn't present volume overload on chest x ray.   - Blood pressure control of ICH to have systolic <140.  - Neuro critical care are agree that patient can be started on HD for removal of toxins, would leave net even in ultrafiltration and see how patient tolerates treatment. Hemodynamically stable, with metabolic alkalosis with respiratory acidosis compensation, will be started on a low biacarb bath, goal is to maintain sodium between 140-145.     - Monitor closely lab results for any change in treatment.

## 2017-07-24 NOTE — HPI
A 52 year old  male, he dialyzes MWF and [facility] under the care of Dr. Hubbard.  His EDW is 73 kg.  Treatment duration of 3:45 minutes.  He has AVF to [facility]. Patient presented at [facility] [place] after passing out in his home, which was witnessed by his sister, whom refer that was down, right side weakness and slightly confused. Upon arrival to ED presented with hypertension, head CT without contrast showed ICH, as chest x ray dose note no pleural fluid of any substantial volume is seen on either side or cardiac decompensation.

## 2017-07-24 NOTE — ASSESSMENT & PLAN NOTE
Bilateral IPH, Left >Right,   NSGY consulted  Vascular Neurology consulted  Believe d/t hypertension  Repeat CTH, no angio d/t renal failure and location specific for HTN hemorrhage,   Coags WNL  No antiplatelet/coagualtion HX  SBP <140, Neuro Checks q1hr  PT/OT/SLP

## 2017-07-24 NOTE — ED NOTES
Pt transported to main [place] w/ [facility]. Nicardipine continue to infuse @5mg/hr per order. Pt sitting up aaox4, denies HA, numbness/tingling or slurred speech at this time.

## 2017-07-24 NOTE — HPI
Vaughn Retana is a 52 y.o. male with a PMHx of HF, DM, ESRD on hemodialysis, HCV, ICH (L BG 5/2013; R BG 9/2016; R BG 11/2016; L caudate head 2/2017), and HTN who presented to OSH with dizziness. Patient became dizzy last night and this morning he had a fall while getting ready for dialysis. He was [place] [facility] [place]. While in ED, he complained of RSW and N/V.

## 2017-07-24 NOTE — HPI
51 yo male with PMhx significant for ESRD on dialysis, previous ICH, HTN, and T2DM.  Pt presented to [facility] this morning after being found down.  Pt lives at home with sister, he was found down and slightly confused.  Upon arrival to the ED he was hypertensive in the 190's.  HCT revealed acute left thalamic ICH, neurosurgery consulted for evaluation.  Pt currently c/o right sided weakness, and some nausea.  Carroll HAs, visual changes, seizures.  No reported use of anticoagulants at home.

## 2017-07-24 NOTE — CONSULTS
"[facility]  Neurosurgery  Consult Note    Consults  Subjective:     Chief Complaint/Reason for Admission: ICH    History of Present Illness: 53 yo male with PMhx significant for ESRD on dialysis, previous ICH, HTN, and T2DM.  Pt presented to [facility] this morning after "passing out" at home.  Pt lives at home with sister, he was found down and slightly confused.  Upon arrival to the ED he was hypertensive in the 190's.  HCT revealed acute left thalamic ICH, neurosurgery consulted for evaluation.  Pt currently c/o right sided weakness, and some nausea.  Carroll HAs, visual changes, seizures.  No reported use of anticoagulants at home.        (Not in a hospital admission)    Review of patient's allergies indicates:   Allergen Reactions    Fosrenol [lanthanum] Nausea And Vomiting     Nausea and vomiting       Past Medical History:   Diagnosis Date    Amputation stump pain 9/10/2013    Aspiration pneumonia 7/27/2015    Asterixis 11/8/2016    C. difficile colitis 8/7/2015    Cholelithiasis without obstruction 8/25/2015    Chronic diastolic heart failure     2-23-17   1 - Low normal to mildly depressed left ventricular systolic function (EF 50-55%).    2 - Right ventricular enlargement with mildly depressed systolic function.    3 - Left ventricular diastolic dysfunction.    4 - Right atrial enlargement.    5 - Severe tricuspid regurgitation.    6 - Pulmonary hypertension. The estimated PA systolic pressure is 86 mmHg.    7 - Increased central venous pressure.     Chronic low back pain 12/1/2015    Closed head injury 9/8/2016    Diabetic neuropathy     ESRD on hemodialysis 2/7/2013    MWF at [facility]    HCV antibody positive     Normal LFT as of 3/2017    Hemiparesis affecting left side as late effect of stroke 11/08/2016    History of Intracerebral Hemorrhage: L BG 5/2013; R BG 9/2016; R BG 11/2016; L caudate head 2/2017 11/2/2016    Hypertension     left basal ganglia ICH 5/2013 " 11/2/2016    Left Caudate Head ICH 2/22/2017 2/24/2017    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 8/1/2015    Metabolic acidosis, IAG, reduced excretion of inorganic acids     Myoclonic jerking 9/20/2016    Secondary hyperparathyroidism (of renal origin)     Secondary pulmonary hypertension 3/23/2017    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    TB lung, latent 08/25/2015    Negative Quantiferon Gold 3-23-17     Past Surgical History:   Procedure Laterality Date    COLONOSCOPY      COLONOSCOPY N/A 4/4/2017    Procedure: COLONOSCOPY;  Surgeon: Walker Stern MD;  Location: [facility] ([address] [place] [address]);  Service: Endoscopy;  Laterality: N/A;  PA Systolic Pressure 85.56. HD Patient MWF, K+ lab prior to procedure.     FOOT AMPUTATION THROUGH METATARSAL      left foot    LEG AMPUTATION THROUGH KNEE  12/18/2013    left BKA    R AVF  9/12/12     Family History     Problem Relation (Age of Onset)    Alcohol abuse Maternal Grandmother    Diabetes Brother, Maternal Grandfather    Early death Mother    Heart disease Father    Hyperlipidemia Father    Hypertension Father    Kidney disease Father        Social History Main Topics    Smoking status: Former Smoker     Packs/day: 1.00     Years: 10.00    Smokeless tobacco: Never Used    Alcohol use No    Drug use: No    Sexual activity: Not on file     Review of Systems   Constitutional: no fever or chills  Eyes: no visual changes  ENT: no nasal congestion or sore throat  Respiratory: no cough or shortness of breath  Cardiovascular: no chest pain or palpitations  Gastrointestinal: no nausea or vomiting  Genitourinary: no hematuria or dysuria  Integument/Breast: no rash or pruritis  Hematologic/Lymphatic: no easy bruising or lymphadenopathy  Musculoskeletal: no arthralgias or myalgias  Neurological: no seizures or tremors      Objective:     Weight: 78.5 kg (173 lb)  Body mass index is 27.92 kg/m².  Vital Signs (Most Recent):  Temp: 98.1 °F (36.7 °C)  (07/24/17 0804)  Pulse: 101 (07/24/17 0930)  Resp: 20 (07/24/17 0930)  BP: 138/69 (07/24/17 0930)  SpO2: 95 % (07/24/17 0930) Vital Signs (24h Range):  Temp:  [97.9 °F (36.6 °C)-98.1 °F (36.7 °C)] 98.1 °F (36.7 °C)  Pulse:  [] 101  Resp:  [16-23] 20  SpO2:  [95 %-100 %] 95 %  BP: (130-191)/() 138/69                         Neurosurgery Physical Exam   General: no distress  Neurologic: Alert and oriented to person, place, not year  Head: normocephalic  GCS: Motor: 6/Verbal: 4/Eyes: 4 GCS Total: 14  Cranial nerves: face symmetric, tongue midline, pupils equal, round, reactive to light with accomodation, extraocular muscles intact  Sensory: response to light touch throughout  Motor Strength: RUE/RLE - 4/5, otherwise full strength on Left upper and lower extremities  Pronator Drift: + drift on right  Finger to nose normal   Lungs:  normal respiratory effort  Abdomen: soft, non-tender   Extremities: no cyanosis or edema, or clubbing         Significant Labs:    Recent Labs  Lab 07/24/17  0647   *      K 5.2*   CL 79*   CO2 40*   BUN 37*   CREATININE 11.7*   CALCIUM 11.7*       Recent Labs  Lab 07/24/17  0647   WBC 8.65   HGB 12.7*   HCT 37.4*        No results for input(s): LABPT, INR, APTT in the last 48 hours.  Microbiology Results (last 7 days)     ** No results found for the last 168 hours. **          Significant Diagnostics:  CT: Ct Head Without Contrast    Result Date: 7/24/2017  1.8 cm left thalamic acute parenchymal hemorrhage with mild surrounding vasogenic edema and local mass effect. No midline shift. Small remote right thalamic infarct. Mild chronic microvascular ischemic changes.   Preliminary findings discussed with ABBIE ADAME at 6:58:41 on 07/24/17. Electronically signed by: SONIA TONG MD Date: 07/24/17 Time: 07:05     Assessment/Plan:     Intraparenchymal hemorrhage of brain    53 yo male with small acute left thalamic hemorrhage, likely hypertensive etiology  - Pt is  neurologically stable, mild confusion and right sided weakness  - No acute neurosurgical interventions indicated   - Platelets / Coags are wnl  - Monitor in neuro ICU overnight for strict BP control < 160, q 1 hour neuro checks  - Repeat HCT in AM  - Will continue to follow, please call with questions                  PRATIK Sood  Neurosurgery  [facility]

## 2017-07-25 LAB
ALBUMIN SERPL BCP-MCNC: 3 G/DL
ALBUMIN SERPL BCP-MCNC: 3 G/DL
ALLENS TEST: ABNORMAL
ALP SERPL-CCNC: 185 U/L
ALT SERPL W/O P-5'-P-CCNC: 9 U/L
ANION GAP SERPL CALC-SCNC: 13 MMOL/L
ANION GAP SERPL CALC-SCNC: 13 MMOL/L
APTT BLDCRRT: 23 SEC
AST SERPL-CCNC: 17 U/L
BASOPHILS # BLD AUTO: 0.02 K/UL
BASOPHILS NFR BLD: 0.2 %
BILIRUB SERPL-MCNC: 0.4 MG/DL
BUN SERPL-MCNC: 33 MG/DL
BUN SERPL-MCNC: 33 MG/DL
CALCIUM SERPL-MCNC: 10.1 MG/DL
CALCIUM SERPL-MCNC: 10.1 MG/DL
CHLORIDE SERPL-SCNC: 96 MMOL/L
CHLORIDE SERPL-SCNC: 96 MMOL/L
CO2 SERPL-SCNC: 31 MMOL/L
CO2 SERPL-SCNC: 31 MMOL/L
CREAT SERPL-MCNC: 9.3 MG/DL
CREAT SERPL-MCNC: 9.3 MG/DL
DELSYS: ABNORMAL
DIFFERENTIAL METHOD: ABNORMAL
EOSINOPHIL # BLD AUTO: 0.2 K/UL
EOSINOPHIL NFR BLD: 2.2 %
ERYTHROCYTE [DISTWIDTH] IN BLOOD BY AUTOMATED COUNT: 13.6 %
EST. GFR  (AFRICAN AMERICAN): 6.7 ML/MIN/1.73 M^2
EST. GFR  (AFRICAN AMERICAN): 6.7 ML/MIN/1.73 M^2
EST. GFR  (NON AFRICAN AMERICAN): 5.8 ML/MIN/1.73 M^2
EST. GFR  (NON AFRICAN AMERICAN): 5.8 ML/MIN/1.73 M^2
GLUCOSE SERPL-MCNC: 98 MG/DL
GLUCOSE SERPL-MCNC: 98 MG/DL
HCO3 UR-SCNC: 48.8 MMOL/L (ref 24–28)
HCT VFR BLD AUTO: 30.7 %
HGB BLD-MCNC: 10.7 G/DL
INR PPP: 1.1
LYMPHOCYTES # BLD AUTO: 2.3 K/UL
LYMPHOCYTES NFR BLD: 25.9 %
MAGNESIUM SERPL-MCNC: 2 MG/DL
MCH RBC QN AUTO: 32.5 PG
MCHC RBC AUTO-ENTMCNC: 34.9 G/DL
MCV RBC AUTO: 93 FL
MODE: ABNORMAL
MONOCYTES # BLD AUTO: 0.9 K/UL
MONOCYTES NFR BLD: 10.1 %
NEUTROPHILS # BLD AUTO: 5.4 K/UL
NEUTROPHILS NFR BLD: 61.5 %
PCO2 BLDA: 59.1 MMHG (ref 35–45)
PH SMN: 7.53 [PH] (ref 7.35–7.45)
PHOSPHATE SERPL-MCNC: 2.6 MG/DL
PHOSPHATE SERPL-MCNC: 2.6 MG/DL
PLATELET # BLD AUTO: 222 K/UL
PMV BLD AUTO: 10.1 FL
PO2 BLDA: ABNORMAL MMHG
POC BE: 26 MMOL/L
POC SATURATED O2: ABNORMAL %
POC TCO2: >50 MMOL/L (ref 23–27)
POCT GLUCOSE: 112 MG/DL (ref 70–110)
POCT GLUCOSE: 118 MG/DL (ref 70–110)
POCT GLUCOSE: 126 MG/DL (ref 70–110)
POCT GLUCOSE: 154 MG/DL (ref 70–110)
POCT GLUCOSE: 193 MG/DL (ref 70–110)
POCT GLUCOSE: 52 MG/DL (ref 70–110)
POTASSIUM SERPL-SCNC: 3.7 MMOL/L
POTASSIUM SERPL-SCNC: 3.7 MMOL/L
PROT SERPL-MCNC: 7.8 G/DL
PROTHROMBIN TIME: 11.4 SEC
RBC # BLD AUTO: 3.29 M/UL
SAMPLE: ABNORMAL
SITE: ABNORMAL
SODIUM SERPL-SCNC: 140 MMOL/L
SODIUM SERPL-SCNC: 140 MMOL/L
SP02: 95
WBC # BLD AUTO: 8.75 K/UL

## 2017-07-25 PROCEDURE — 97162 PT EVAL MOD COMPLEX 30 MIN: CPT

## 2017-07-25 PROCEDURE — G8997 SWALLOW GOAL STATUS: HCPCS | Mod: CI

## 2017-07-25 PROCEDURE — 20000000 HC ICU ROOM

## 2017-07-25 PROCEDURE — A4216 STERILE WATER/SALINE, 10 ML: HCPCS | Performed by: NURSE PRACTITIONER

## 2017-07-25 PROCEDURE — 99222 1ST HOSP IP/OBS MODERATE 55: CPT | Mod: ,,, | Performed by: NURSE PRACTITIONER

## 2017-07-25 PROCEDURE — 85730 THROMBOPLASTIN TIME PARTIAL: CPT

## 2017-07-25 PROCEDURE — G8996 SWALLOW CURRENT STATUS: HCPCS | Mod: CJ

## 2017-07-25 PROCEDURE — 83735 ASSAY OF MAGNESIUM: CPT

## 2017-07-25 PROCEDURE — 63600175 PHARM REV CODE 636 W HCPCS: Performed by: NURSE PRACTITIONER

## 2017-07-25 PROCEDURE — 25000003 PHARM REV CODE 250: Performed by: NURSE PRACTITIONER

## 2017-07-25 PROCEDURE — 99233 SBSQ HOSP IP/OBS HIGH 50: CPT | Mod: ,,, | Performed by: NURSE PRACTITIONER

## 2017-07-25 PROCEDURE — 80069 RENAL FUNCTION PANEL: CPT

## 2017-07-25 PROCEDURE — 92523 SPEECH SOUND LANG COMPREHEN: CPT

## 2017-07-25 PROCEDURE — 85025 COMPLETE CBC W/AUTO DIFF WBC: CPT

## 2017-07-25 PROCEDURE — 85610 PROTHROMBIN TIME: CPT

## 2017-07-25 PROCEDURE — 97530 THERAPEUTIC ACTIVITIES: CPT

## 2017-07-25 PROCEDURE — 99233 SBSQ HOSP IP/OBS HIGH 50: CPT | Mod: ,,, | Performed by: PSYCHIATRY & NEUROLOGY

## 2017-07-25 PROCEDURE — 97802 MEDICAL NUTRITION INDIV IN: CPT

## 2017-07-25 PROCEDURE — 80053 COMPREHEN METABOLIC PANEL: CPT

## 2017-07-25 PROCEDURE — 92610 EVALUATE SWALLOWING FUNCTION: CPT

## 2017-07-25 PROCEDURE — 99232 SBSQ HOSP IP/OBS MODERATE 35: CPT | Mod: GC,,, | Performed by: INTERNAL MEDICINE

## 2017-07-25 RX ORDER — PANTOPRAZOLE SODIUM 40 MG/1
40 TABLET, DELAYED RELEASE ORAL DAILY
Status: DISCONTINUED | OUTPATIENT
Start: 2017-07-25 | End: 2017-07-26

## 2017-07-25 RX ORDER — ONDANSETRON 2 MG/ML
8 INJECTION INTRAMUSCULAR; INTRAVENOUS EVERY 8 HOURS PRN
Status: DISCONTINUED | OUTPATIENT
Start: 2017-07-25 | End: 2017-07-28 | Stop reason: HOSPADM

## 2017-07-25 RX ORDER — METOCLOPRAMIDE HYDROCHLORIDE 5 MG/ML
10 INJECTION INTRAMUSCULAR; INTRAVENOUS EVERY 6 HOURS
Status: COMPLETED | OUTPATIENT
Start: 2017-07-25 | End: 2017-07-26

## 2017-07-25 RX ORDER — BISACODYL 10 MG
10 SUPPOSITORY, RECTAL RECTAL DAILY PRN
Status: DISCONTINUED | OUTPATIENT
Start: 2017-07-25 | End: 2017-07-28 | Stop reason: HOSPADM

## 2017-07-25 RX ORDER — ONDANSETRON 2 MG/ML
4 INJECTION INTRAMUSCULAR; INTRAVENOUS EVERY 6 HOURS
Status: COMPLETED | OUTPATIENT
Start: 2017-07-25 | End: 2017-07-26

## 2017-07-25 RX ADMIN — PANTOPRAZOLE SODIUM 40 MG: 40 TABLET, DELAYED RELEASE ORAL at 01:07

## 2017-07-25 RX ADMIN — POLYETHYLENE GLYCOL 3350 17 G: 17 POWDER, FOR SOLUTION ORAL at 08:07

## 2017-07-25 RX ADMIN — CINACALCET HYDROCHLORIDE 60 MG: 60 TABLET, COATED ORAL at 10:07

## 2017-07-25 RX ADMIN — METOCLOPRAMIDE 10 MG: 5 INJECTION, SOLUTION INTRAMUSCULAR; INTRAVENOUS at 08:07

## 2017-07-25 RX ADMIN — STANDARDIZED SENNA CONCENTRATE AND DOCUSATE SODIUM 1 TABLET: 8.6; 5 TABLET, FILM COATED ORAL at 09:07

## 2017-07-25 RX ADMIN — CARVEDILOL 25 MG: 25 TABLET, FILM COATED ORAL at 08:07

## 2017-07-25 RX ADMIN — DEXTROSE MONOHYDRATE 12.5 G: 25 INJECTION, SOLUTION INTRAVENOUS at 07:07

## 2017-07-25 RX ADMIN — INSULIN ASPART 2 UNITS: 100 INJECTION, SOLUTION INTRAVENOUS; SUBCUTANEOUS at 04:07

## 2017-07-25 RX ADMIN — HYDRALAZINE HYDROCHLORIDE 10 MG: 20 INJECTION INTRAMUSCULAR; INTRAVENOUS at 03:07

## 2017-07-25 RX ADMIN — METOCLOPRAMIDE 10 MG: 5 INJECTION, SOLUTION INTRAMUSCULAR; INTRAVENOUS at 05:07

## 2017-07-25 RX ADMIN — ONDANSETRON 4 MG: 2 INJECTION INTRAMUSCULAR; INTRAVENOUS at 12:07

## 2017-07-25 RX ADMIN — Medication 3 ML: at 01:07

## 2017-07-25 RX ADMIN — ONDANSETRON 4 MG: 2 INJECTION INTRAMUSCULAR; INTRAVENOUS at 03:07

## 2017-07-25 RX ADMIN — AMLODIPINE BESYLATE 10 MG: 10 TABLET ORAL at 06:07

## 2017-07-25 RX ADMIN — CARVEDILOL 25 MG: 25 TABLET, FILM COATED ORAL at 04:07

## 2017-07-25 RX ADMIN — STANDARDIZED SENNA CONCENTRATE AND DOCUSATE SODIUM 1 TABLET: 8.6; 5 TABLET, FILM COATED ORAL at 08:07

## 2017-07-25 RX ADMIN — ONDANSETRON 4 MG: 2 INJECTION INTRAMUSCULAR; INTRAVENOUS at 05:07

## 2017-07-25 RX ADMIN — LABETALOL HYDROCHLORIDE 10 MG: 5 INJECTION, SOLUTION INTRAVENOUS at 03:07

## 2017-07-25 RX ADMIN — ATORVASTATIN CALCIUM 40 MG: 20 TABLET, FILM COATED ORAL at 08:07

## 2017-07-25 NOTE — ASSESSMENT & PLAN NOTE
SBP <140  EKG: ST  EF 60-65%  HOme Coreg, lisinopril, Norvasc resumed.   PRN labetolol/hydralazine and cardene

## 2017-07-25 NOTE — HPI
Vaughn Retana is a 52-year-old male with PMHx of HTN, DMII, hepatitis C, ESRD on HD MWF, ICH/IPH (L BG 5/2013, R BG 9/2016, R caudate 11/2016, and L caudate 2/2017), and s/p L BKA (2013).  Patient presented to [facility] with confusion, right sided weakness, dizziness, and N/V after being found down at home.  On arrival, found to be hypertensive.  CTH showed L thalamic IPH with mild vasogenic edema and mass effect.  [place] [facility] [place] for further evaluation and management.  Upon admission, repeat CTH stable.  Evaluated by neurosurgery and no acute intervention necessary.       Functional History: Patient lives in [place] with his brother and niece in a single story home with 4 steps to enter.  Prior to admission, he was independent with ADLs and mobility.  He is right handed.  DME: LLE prosthesis.    [facility] (9/16/16-9/29/16):  At discharge, bed mobility mod I, transfers SV, ambulation SBA, UBD independent, and LBD SBA.

## 2017-07-25 NOTE — PROGRESS NOTES
[facility]  Vascular Neurology  Comprehensive Stroke Center  Progress Note    Assessment/Plan:     7/25/17-neurologically stable, has some mild aphasia and inattention, N/V remains    * Nontraumatic intracerebral hemorrhage, unspecified    Vaughn Retana is a 52 y.o. male with history of multiple subcortical ICH presents to hospital with dizziness, RSW, and N/V and found to have a L thalamic ICH    Antiplatelets: none, ICH  Statins: none, ICH  SBP <140  DVT ppx: SCDs  PT/OT and speech to evaluate and treat  Neuro checks q1h    Recommend MRI and CTA                                        Vasogenic cerebral edema    2/2 ICH  Evident on imaging        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    Stroke risk factor  SBP <140  On cardene  Management per primary team        ESRD on hemodialysis    On MWF  Nephrology consulted        Dyslipidemia    Stroke risk factor  LDL >70 on home atorvastatin 40, will need to increase to 80 however okay to hold in setting of acute ICH          Type 2 diabetes mellitus with chronic kidney disease on chronic dialysis    Stroke risk factor  A1C 5.0  Management per primary team            Neurologic Chief Complaint: L thalamic ICH    Subjective:     Interval History: Patient is seen for follow-up neurological assessment and treatment recommendations: NAEON, patient with occasional vomiting, no new complaints    HPI, Past Medical, Family, and Social History remains the same as documented in the initial encounter.     Review of Systems   Constitutional: Negative for chills and fever.   Eyes: Negative for visual disturbance.   Respiratory: Negative for shortness of breath.    Cardiovascular: Negative for chest pain and palpitations.   Gastrointestinal: Positive for nausea and vomiting.   Neurological: Positive for speech difficulty and weakness. Negative for facial asymmetry and numbness.   Psychiatric/Behavioral: Negative for agitation.     Scheduled Meds:    amlodipine  10 mg Oral QAM    atorvastatin  40 mg Oral Daily    carvedilol  25 mg Oral BID WM    cinacalcet  60 mg Oral Daily with breakfast    lisinopril  40 mg Oral QHS    metoclopramide HCl  10 mg Intravenous Q6H    ondansetron  4 mg Intravenous Q6H    pantoprazole  40 mg Oral Daily    polyethylene glycol  17 g Oral Daily    potassium chloride  10 mEq Intravenous Once    senna-docusate 8.6-50 mg  1 tablet Oral BID    sodium chloride 0.9%  3 mL Intravenous Q8H     Continuous Infusions:   nicardipine Stopped (07/25/17 0805)     PRN Meds:sodium chloride 0.9%, acetaminophen, bisacodyl, dextrose 50%, dextrose 50%, glucagon (human recombinant), glucose, glucose, hydrALAZINE, insulin aspart, labetalol, ondansetron    Objective:     Vital Signs (Most Recent):  Temp: 98 °F (36.7 °C) (07/25/17 1105)  Pulse: 81 (07/25/17 1205)  Resp: 15 (07/25/17 1205)  BP: 129/74 (07/25/17 1205)  SpO2: 99 % (07/25/17 1205)  BP Location: Left arm    Vital Signs Range (Last 24H):  Temp:  [97.8 °F (36.6 °C)-98.5 °F (36.9 °C)]   Pulse:  []   Resp:  [11-62]   BP: (101-154)/(52-93)   SpO2:  [92 %-100 %]   BP Location: Left arm    Physical Exam   Constitutional: He appears well-developed and well-nourished. No distress.   HENT:   Head: Normocephalic and atraumatic.   Eyes: EOM are normal. Pupils are equal, round, and reactive to light.   Cardiovascular: Normal rate.    Pulmonary/Chest: Effort normal. No respiratory distress.   Abdominal: He exhibits distension.   Neurological: He is alert.   inattention   Skin: Skin is warm and dry.   Vitals reviewed.      Neurological Exam:   LOC: alert and follows requests  Language: Naming impaired  Speech: No dysarthria  Orientation: Person, Place, Not oriented to time  Memory: Abnormalities: No age correct and No month correct  Visual Fields (CN II): Full  EOM (CN III, IV, VI): Full/intact  Pupils (CN III, IV, VI): PERRL  Facial Sensation (CN V): Symmetric  Facial Movement (CN VII):  symmetric facial expression  Hearing (CN VIII): intact bilaterally  Motor*: Arm Left:  Normal (5/5), Leg Left:   Normal (5/5), Arm Right:   Paretic:  4/5, Leg Right:   Paretic:  4/5  Cerebellar*: Normal limb, Normal gait  , Normal stance  Sensation: intact to light touch, temperature and vibration  Tone: Arm-Left: normal; Leg-Left: normal; Arm-Right: normal; Leg-Right: normal    NIH Stroke Scale:    Level of Consciousness: 0 - alert  LOC Questions: 2 - answers none correctly  LOC Commands: 0 - performs both correctly  Best Gaze: 0 - normal  Visual: 0 - no visual loss  Facial Palsy: 0 - normal  Motor Left Arm: 0 - no drift  Motor Right Arm: 1 - drift  Motor Left Le - no drift  Motor Right Le - drift  Limb Ataxia: 0 - absent  Sensory: 0 - normal  Best Language: 1 - mild to moderate aphasia  Dysarthria: 0 - normal articulation  Extinction and Inattention: 0 - no neglect  NIH Stroke Scale Total: 5      Laboratory:  CMP:   Recent Labs  Lab 17  0240   CALCIUM 10.1  10.1   ALBUMIN 3.0*  3.0*   PROT 7.8     140   K 3.7  3.7   CO2 31*  31*   CL 96  96   BUN 33*  33*   CREATININE 9.3*  9.3*   ALKPHOS 185*   ALT 9*   AST 17   BILITOT 0.4     CBC:   Recent Labs  Lab 17  0240   WBC 8.75   RBC 3.29*   HGB 10.7*   HCT 30.7*      MCV 93   MCH 32.5*   MCHC 34.9     Lipid Panel:   Recent Labs  Lab 17  1438   CHOL 195   LDLCALC 123.8   HDL 48   TRIG 116     Coagulation:   Recent Labs  Lab 17  0240   INR 1.1   APTT 23.0     Platelet Aggregation Study: No results for input(s): PLTAGG, PLTAGINTERP, PLTAGREGLACO, ADPPLTAGGREG in the last 168 hours.  Hgb A1C:   Recent Labs  Lab 17  1137   HGBA1C 5.0     TSH:   Recent Labs  Lab 17  1137   TSH 1.273       Diagnostic Results:  I have personally reviewed: CT Head. Date: 17  Findings: Stable acute left thalamic hemorrhage .    Remote right thalamic infarct.    Chronic microvascular ischemic changes.            Caitlyn BISHOP  [facility]  Comprehensive Stroke Center  Department of Vascular Neurology   [facility]

## 2017-07-25 NOTE — PROGRESS NOTES
Hemodialysis complete. Zero net fluid removal. System clotting after 2 hours of tx time. Blood returned without difficulty. Needles removed and pressure held x 10 minutes until hemostasis achieved. Gauze pressure dressing applied and secured with tape. Positive bruit/thrill noted.

## 2017-07-25 NOTE — PLAN OF CARE
SW met with Pt at bedside. Reported recs for rehab and gave list. He indicated that he understood the recs by nodding. He did not speak. Left list on table next to bedside.    Kamini Ramírez LMSW  Neurocritical Care   [facility]  [id number]

## 2017-07-25 NOTE — ASSESSMENT & PLAN NOTE
-  History of multiple ICH/IPH  -  CTH showing L thalamic IPH  -  Hypertensive on arrival, off Cardene gtt  -  Repeat CTH stable  -  Evaluated by Neurosurgery and no acute intervention necessary     Functional status: see hospital course  Cognitive/Speech/Language status: Cognitive-Linguistic Impairment.   Nutrition/Swallow Status:  Passed bedside swallow evaluation.  SLP recommended dental soft diet and thin liquids.    Recommendations  -  Encourage mobility, OOB in chair at least 3 hours per day, and early ambulation as appropriate   -  PT/OT evaluate and treat  -  SLP speech and cognitive evaluate and treat  -  Monitor sleep disturbances and establish consistent sleep-wake cycle  -  Monitor for bowel and bladder dysfunction  -  Monitor for shoulder pain and subluxation  -  Monitor for spasticity  -  Monitor for and prevent skin breakdown and pressure ulcers  · Early mobility, repositioning/weight shifting every 20-30 minutes when sitting, turn patient every 2 hours, proper mattress/overlay and chair cushioning, pressure relief/heel protector boots  -  DVT prophylaxis:  MEL, SCD  -  Reviewed discharge options (IP rehab, SNF, HH therapy, and OP therapy)

## 2017-07-25 NOTE — PROGRESS NOTES
[facility]  Neurocritical Care  Progress Note    Admit Date: 7/24/2017  Service Date: 07/25/2017  Length of Stay: 1    Subjective:     Chief Complaint: Nontraumatic intracerebral hemorrhage, unspecified    History of Present Illness: 53 yo male with PMhx significant for ESRD on dialysis, previous ICH, HTN, and T2DM.  Pt presented to [facility] this morning after being found down.  Pt lives at home with sister, he was found down and slightly confused.  Upon arrival to the ED he was hypertensive in the 190's.  HCT revealed acute left thalamic ICH, neurosurgery consulted for evaluation.  Pt currently c/o right sided weakness, and some nausea.  Carroll HAs, visual changes, seizures.  No reported use of anticoagulants at home.       Hospital Course: -Admit to [facility], pending repeat CTH, SBP <140  -F/u CTH planned in AM, Neuro Exam stable.     Interval History:    -Repeat CTH in AM, exam stable.   -Continue scheduled reglan/zofran x 24 hours for N/V  -Bowel regimen started, plan for low residual diet.   -Plan to step down in AM to vascular Neurology.           Review of Systems   Respiratory: Negative for apnea, cough, choking, chest tightness, shortness of breath, wheezing and stridor.    Cardiovascular: Negative for chest pain, palpitations and leg swelling.   Gastrointestinal: Positive for nausea and vomiting.   Skin: Negative for color change.   Psychiatric/Behavioral: Negative for agitation.       Objective:     Vitals:  Temp: 98 °F (36.7 °C) (07/25/17 1105)  Pulse: 81 (07/25/17 1205)  Resp: 15 (07/25/17 1205)  BP: 129/74 (07/25/17 1205)  SpO2: 99 % (07/25/17 1205)    Temp:  [97.8 °F (36.6 °C)-98.5 °F (36.9 °C)] 98 °F (36.7 °C)  Pulse:  [] 81  Resp:  [11-62] 15  SpO2:  [92 %-100 %] 99 %  BP: (101-154)/(52-93) 129/74              07/24 0701 - 07/25 0700  In: 1370.2 [I.V.:870.2]  Out: 500     Physical Exam   Cardiovascular: Normal rate, regular rhythm, normal heart sounds and intact distal pulses.     Pulmonary/Chest: Effort normal and breath sounds normal.   Abdominal: Soft. Bowel sounds are normal.   Neurological:   E4 V5 M6  AAO person, place,year  PERRLA, EOMI 4mm/4mm  Right facial weakness  FLORES, follows commands  Mild right hemiparesis/drift   Skin: Skin is warm and dry.   Vitals reviewed.   Skin: Skin is warm.         Medications:  Continuous  nicardipine Last Rate: Stopped (07/25/17 0805)   Scheduled  amlodipine 10 mg QAM   atorvastatin 40 mg Daily   carvedilol 25 mg BID WM   cinacalcet 60 mg Daily with breakfast   lisinopril 40 mg QHS   metoclopramide HCl 10 mg Q6H   ondansetron 4 mg Q6H   polyethylene glycol 17 g Daily   potassium chloride 10 mEq Once   senna-docusate 8.6-50 mg 1 tablet BID   sodium chloride 0.9% 3 mL Q8H   PRN  sodium chloride 0.9%  PRN   acetaminophen 650 mg Q6H PRN   bisacodyl 10 mg Daily PRN   dextrose 50% 12.5 g PRN   dextrose 50% 25 g PRN   glucagon (human recombinant) 1 mg PRN   glucose 16 g PRN   glucose 24 g PRN   hydrALAZINE 10 mg Q4H PRN   insulin aspart 1-10 Units QID (AC + HS) PRN   labetalol 10 mg Q4H PRN   ondansetron 4 mg Q6H PRN     Today I personally reviewed pertinent medications, lines/drains/airways, imaging, cardiology, lab results, microbiology results,         Assessment/Plan:     Neuro   Intraparenchymal hemorrhage of brain    Bilateral IPH, Left >Right,   NSGY consulted  Vascular Neurology consulted  Believe d/t hypertension  Repeat CTH in AM, no angio d/t renal failure and location specific for HTN hemorrhage,   Coags WNL  No antiplatelet/coagualtion HX  SBP <140, Neuro Checks q1hr  PT/OT/SLP          Cardiac   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    SBP <140  EKG: ST  EF 60-65%  HOme Coreg, lisinopril, Norvasc resumed.   PRN labetolol/hydralazine and cardene          Renal   ESRD on hemodialysis    Last received dialysis on 7/24  Nephrology followin  Plans for HD, Na goal 140-145        Type 2 diabetes mellitus with chronic kidney disease on  chronic dialysis    ISS, Q6hr Accuchecks  HgbA1c 5.0  Euglycemia        Fluids/Electrolytes/Nutrition/GI   Nausea & vomiting    Scheduled zofran/reglan x 24 hours  Consult nutrition for recs on  Diet, will start when recs placed.             Prophylaxis:  Venous Thromboembolism: mechanical  Stress Ulcer: PPI  Ventilator Pneumonia: not applicable     Activity Orders          None        Full Code   Level 3  I spent >35 minutes reviewing patient records, examining, and counseling the patient with greater than 50% of the time spent with direct patient care and coordination.       Wade Burnett NP  Neurocritical Care  [facility]

## 2017-07-25 NOTE — PROGRESS NOTES
Still with nausea/vomiting  Schedule reglan and zofran q6hrly for 24 hrs only then PRN.  Appreciate nephrology input. Will hold off on proton inhibitor for now.   Plan for repeat CT head in am, if stable may consider transfer to [facility].   Pt/ot   oob to chair as tolerated

## 2017-07-25 NOTE — ASSESSMENT & PLAN NOTE
Scheduled zofran/reglan x 24 hours  Consult nutrition for recs on  Diet, will start when recs placed.

## 2017-07-25 NOTE — HOSPITAL COURSE
7/24: admitted with L thalamic ICH, neuro ICU, stable on exam  7/25: head CT stable  7/26: transfer to stroke, NSY signing off

## 2017-07-25 NOTE — CONSULTS
[facility]  Physical Medicine & Rehab  Consult Note    Patient Name: Vaughn Retana  MRN: [id number]  Admission Date: 7/24/2017  Hospital Length of Stay: 1 days  Attending Physician: Jeff Spencer MD   Collaborating Physician: Rush Jain MD    Inpatient consult to Physical Medicine & Rehabilitation  Consult performed by: Alma Paige NP  Consult requested by:  Shabbir Mendez MD    Reason for Consult:  assess rehabilitation needs    Consults  Subjective:     Principal Problem: Nontraumatic intracerebral hemorrhage, unspecified    HPI: Vaughn Retana is a 52-year-old male with PMHx of HTN, DMII, hepatitis C, ESRD on HD MWF, ICH/IPH (L BG 5/2013, R BG 9/2016, R caudate 11/2016, and L caudate 2/2017), and s/p L BKA (2013).  Patient presented to [facility] with confusion, right sided weakness, dizziness, and N/V after being found down at home.  On arrival, found to be hypertensive.  CTH showed L thalamic IPH with mild vasogenic edema and mass effect.  Transferred to [facility] on 7/24/17 for further evaluation and management.  Upon admission, repeat CTH stable.  Evaluated by neurosurgery and no acute intervention necessary.       Functional History: Patient lives in [place] with his brother and niece in a single story home with 4 steps to enter.  Prior to admission, he was independent with ADLs and mobility.  He is right handed.  DME: LLE prosthesis.    [facility] (9/16/16-9/29/16):  At discharge, bed mobility mod I, transfers SV, ambulation SBA, UBD independent, and LBD SBA.    Hospital Course:   7/24/17:  Evaluated by OT.  Bed mobility mod-totalA.  No transfers.  UBD totalA.    AM-PAC 6 CLICK MOBILITY (PT) - Total score: evaluation pending  AM-PAC 6 CLICK ADL (OT) - Total score: 6 (7/24)    AM-PAC Raw Score Level of Impairment Assistance   6 100% Total / Unable   7 - 8 80 - 100% Maximal Assist   9-13 60 - 80% Moderate Assist   14 - 19 40 - 60% Moderate Assist   20 - 22 20 - 40%  Minimal Assist   23 1-20% SBA / CGA   24 0% Independent/ Mod I     Past Medical History:   Diagnosis Date    Amputation stump pain 9/10/2013    Aspiration pneumonia 7/27/2015    Asterixis 11/8/2016    C. difficile colitis 8/7/2015    Cholelithiasis without obstruction 8/25/2015    Chronic diastolic heart failure     2-23-17   1 - Low normal to mildly depressed left ventricular systolic function (EF 50-55%).    2 - Right ventricular enlargement with mildly depressed systolic function.    3 - Left ventricular diastolic dysfunction.    4 - Right atrial enlargement.    5 - Severe tricuspid regurgitation.    6 - Pulmonary hypertension. The estimated PA systolic pressure is 86 mmHg.    7 - Increased central venous pressure.     Chronic low back pain 12/1/2015    Closed head injury 9/8/2016    Diabetic neuropathy     ESRD on hemodialysis 2/7/2013    MWF at [facility]   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    Metabolic acidosis, IAG, reduced excretion of inorganic acids     Myoclonic jerking 9/20/2016    Secondary hyperparathyroidism (of renal origin)     Secondary pulmonary hypertension 3/23/2017    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 HD Patient MWF, K+ lab prior to procedure.     FOOT AMPUTATION THROUGH  METATARSAL      left foot    LEG AMPUTATION THROUGH KNEE  12/18/2013    left BKA    R AVF  9/12/12     Review of patient's allergies indicates:   Allergen Reactions    Fosrenol [lanthanum] Nausea And Vomiting     Nausea and vomiting       Scheduled Medications:    amlodipine  10 mg Oral QAM    atorvastatin  40 mg Oral Daily    carvedilol  25 mg Oral BID WM    cinacalcet  60 mg Oral Daily with breakfast    lisinopril  40 mg Oral QHS    metoclopramide HCl  10 mg Intravenous Q6H    ondansetron  4 mg Intravenous Q6H    polyethylene glycol  17 g Oral Daily    potassium chloride  10 mEq Intravenous Once    senna-docusate 8.6-50 mg  1 tablet Oral BID    sodium chloride 0.9%  3 mL Intravenous Q8H       PRN Medications: sodium chloride 0.9%, acetaminophen, bisacodyl, dextrose 50%, dextrose 50%, glucagon (human recombinant), glucose, glucose, hydrALAZINE, insulin aspart, labetalol, ondansetron    Family History     Problem Relation (Age of Onset)    Alcohol abuse Maternal Grandmother    Diabetes Brother, Maternal Grandfather    Early death Mother    Heart disease Father    Hyperlipidemia Father    Hypertension Father    Kidney disease Father        Social History Main Topics    Smoking status: Former Smoker     Packs/day: 1.00     Years: 10.00    Smokeless tobacco: Never Used    Alcohol use No    Drug use: No    Sexual activity: Not on file     Review of Systems   Constitutional: Negative for chills, fatigue and fever.   HENT: Negative for drooling, hearing loss, trouble swallowing and voice change.    Eyes: Negative for pain and visual disturbance.   Respiratory: Negative for cough, shortness of breath and wheezing.    Cardiovascular: Negative for chest pain and palpitations.   Gastrointestinal: Positive for nausea. Negative for abdominal pain and vomiting.   Endocrine: Positive for polydipsia.   Genitourinary: Negative for difficulty urinating and flank pain.   Musculoskeletal: Negative for arthralgias,  back pain, myalgias and neck pain.   Skin: Negative for rash and wound.   Neurological: Positive for dizziness and weakness. Negative for numbness and headaches.   Psychiatric/Behavioral: Negative for agitation and hallucinations. The patient is not nervous/anxious.      Objective:     Vital Signs (Most Recent):  Temp: 98.5 °F (36.9 °C) (17 0705)  Pulse: 81 (17 1005)  Resp: 16 (17 1005)  BP: 101/60 (17 1005)  SpO2: 99 % (17 1005)    Vital Signs (24h Range):  Temp:  [97.8 °F (36.6 °C)-98.5 °F (36.9 °C)] 98.5 °F (36.9 °C)  Pulse:  [] 81  Resp:  [11-62] 16  SpO2:  [92 %-100 %] 99 %  BP: (101-154)/(52-93) 101/60     Body mass index is 24.48 kg/m².    Physical Exam   Constitutional: He appears well-developed and well-nourished. No distress.   HENT:   Head: Normocephalic and atraumatic.   Right Ear: External ear normal.   Left Ear: External ear normal.   Nose: Nose normal.   Eyes: Right eye exhibits no discharge. Left eye exhibits no discharge. No scleral icterus.   Neck: Normal range of motion.   Cardiovascular: Normal rate, regular rhythm and intact distal pulses.    Pulmonary/Chest: Effort normal. No respiratory distress. He has no wheezes.   Abdominal: Soft. He exhibits no distension. There is no tenderness.   Musculoskeletal: Normal range of motion. He exhibits no edema or tenderness.   L BKA   Neurological:   -  Mental Status:  Sleeping.  Opens eyes to voice.  Oriented x 3.  Limited participation.  Follows commands.  Answers correct age and .  -  Speech and language:  no aphasia or dysarthria.  -  Vision:  no hemianopsia or ptosis.  -  Facial movement (CN VII): symmetrical.  -  Coordination:  Unable to assess 2/2 patient participation.  -  Motor:  RUE: 4/5.  LUE: 5/5.  RLE: 4/5.  LLE: 5/5.   Skin: Skin is warm and dry. No rash noted.   Psychiatric: He has a normal mood and affect. His behavior is normal. Thought content normal.   Vitals reviewed.    Diagnostic Results:   Labs:  Reviewed  X-Ray: Reviewed  CT: Reviewed    Assessment/Plan:     Intraparenchymal hemorrhage of brain    -  See ICH        Vasogenic cerebral edema    -  See ICH        ESRD on hemodialysis    -  HD MWF  -  Nephrology following         * Nontraumatic intracerebral hemorrhage, unspecified    -  History of multiple ICH/IPH  -  CTH showing L thalamic IPH  -  Hypertensive on arrival, off Cardene gtt  -  Repeat CTH stable  -  Evaluated by Neurosurgery and no acute intervention necessary     Functional status: see hospital course  Cognitive/Speech/Language status: Cognitive-Linguistic Impairment  Nutrition/Swallow Status:  Passed bedside swallow evaluation.  SLP recommended dental soft diet and thin liquids.    Recommendations  -  Encourage mobility, OOB in chair at least 3 hours per day, and early ambulation as appropriate   -  PT/OT evaluate and treat  -  SLP speech and cognitive evaluate and treat  -  Monitor sleep disturbances and establish consistent sleep-wake cycle  -  Monitor for bowel and bladder dysfunction  -  Monitor for shoulder pain and subluxation  -  Monitor for spasticity  -  Monitor for and prevent skin breakdown and pressure ulcers  · Early mobility, repositioning/weight shifting every 20-30 minutes when sitting, turn patient every 2 hours, proper mattress/overlay and chair cushioning, pressure relief/heel protector boots  -  DVT prophylaxis:  MEL, SCD  -  Reviewed discharge options (IP rehab, SNF, HH therapy, and OP therapy)        Not ready for discharge.  PT evaluation pending.  Will follow and discuss with rehab team for further rehab recommendations.    Thank you for your consult.    SEAN Gonzalez  Department of Physical Medicine & Rehab  [facility]

## 2017-07-25 NOTE — ASSESSMENT & PLAN NOTE
Bilateral IPH, Left >Right,   NSGY consulted  Vascular Neurology consulted  Believe d/t hypertension  Repeat CTH in AM, no angio d/t renal failure and location specific for HTN hemorrhage,   Coags WNL  No antiplatelet/coagualtion HX  SBP <140, Neuro Checks q1hr  PT/OT/SLP

## 2017-07-25 NOTE — PROGRESS NOTES
"[facility]  Neurosurgery  Progress Note    Subjective:     History of Present Illness: 53 yo male with PMhx significant for ESRD on dialysis, previous ICH, HTN, and T2DM.  Pt presented to [facility] [place], operated by [facility] this morning after "passing out" at home.  Pt lives at home with sister, he was found down and slightly confused.  Upon arrival to the ED he was hypertensive in the 190's.  HCT revealed acute left thalamic ICH, neurosurgery consulted for evaluation.  Pt currently c/o right sided weakness, and some nausea.  Carroll HAs, visual changes, seizures.  No reported use of anticoagulants at home.      Post-Op Info:  * No surgery found *         Interval History: NAEON.    Medications:  Continuous Infusions:   nicardipine Stopped (07/25/17 0805)     Scheduled Meds:   amlodipine  10 mg Oral QAM    atorvastatin  40 mg Oral Daily    carvedilol  25 mg Oral BID WM    cinacalcet  60 mg Oral Daily with breakfast    lisinopril  40 mg Oral QHS    metoclopramide HCl  10 mg Intravenous Q12H    polyethylene glycol  17 g Oral Daily    potassium chloride  10 mEq Intravenous Once    senna-docusate 8.6-50 mg  1 tablet Oral BID    sodium chloride 0.9%  3 mL Intravenous Q8H     PRN Meds:sodium chloride 0.9%, acetaminophen, dextrose 50%, dextrose 50%, glucagon (human recombinant), glucose, glucose, hydrALAZINE, insulin aspart, labetalol, ondansetron     Review of Systems  Objective:     Weight: 68.8 kg (151 lb 10.8 oz)  Body mass index is 24.48 kg/m².  Vital Signs (Most Recent):  Temp: 98.5 °F (36.9 °C) (07/25/17 0705)  Pulse: 93 (07/25/17 0905)  Resp: 15 (07/25/17 0905)  BP: (!) 101/56 (07/25/17 0905)  SpO2: 100 % (07/25/17 0905) Vital Signs (24h Range):  Temp:  [97.8 °F (36.6 °C)-98.5 °F (36.9 °C)] 98.5 °F (36.9 °C)  Pulse:  [] 93  Resp:  [11-62] 15  SpO2:  [92 %-100 %] 100 %  BP: (101-154)/(52-93) 101/56       Date 07/25/17 0700 - 07/26/17 0659   Shift 6695-2378 7979-5886 7317-1071 24 Hour Total "   I  N  T  A  K  E   I.V.  (mL/kg) 50  (0.7)   50  (0.7)    Shift Total  (mL/kg) 50  (0.7)   50  (0.7)   O  U  T  P  U  T   Shift Total  (mL/kg)       Weight (kg) 68.8 68.8 68.8 68.8                        Neurosurgery Physical Exam     General: no distress  Neurologic: Alert and oriented to person, place, not year  Head: normocephalic  GCS: Motor: 6/Verbal: 4/Eyes: 4 GCS Total: 14  Cranial nerves: face symmetric, tongue midline, pupils equal, round, reactive to light with accomodation, extraocular muscles intact  Sensory: response to light touch throughout  Motor Strength: RUE/RLE - 4/5, otherwise full strength on Left upper and lower extremitiesPronator Drift: + drift on right  Finger to nose normal   Lungs:  normal respiratory effort  Abdomen: soft, non-tender   Extremities: no cyanosis or edema, or clubbing    Significant Labs:    Recent Labs  Lab 07/24/17  0647 07/24/17  1659 07/25/17  0240   * 134* 98  98    141 140  140   K 5.2* 3.3* 3.7  3.7   CL 79* 82* 96  96   CO2 40* 42* 31*  31*   BUN 37* 46* 33*  33*   CREATININE 11.7* 11.8* 9.3*  9.3*   CALCIUM 11.7* 10.7* 10.1  10.1   MG  --   --  2.0       Recent Labs  Lab 07/24/17  0647 07/25/17  0240   WBC 8.65 8.75   HGB 12.7* 10.7*   HCT 37.4* 30.7*    222       Recent Labs  Lab 07/24/17  0854 07/25/17  0240   INR 1.1 1.1   APTT 24.5 23.0     Microbiology Results (last 7 days)     ** No results found for the last 168 hours. **        Significant Diagnostics:  CT: Ct Head Without Contrast    Result Date: 7/24/2017   Stable acute left thalamic hemorrhage . Remote right thalamic infarct. Chronic microvascular ischemic changes. ______________________________________ Electronically signed by resident: AIDE MUNOZ Date:     07/24/17 Time:    15:27 As the supervising and teaching physician, I personally reviewed the images and resident's interpretation and I agree with the findings. Electronically signed by: TIM GARIBAY MD Date:      07/24/17 Time:    15:43     Assessment/Plan:     * Nontraumatic intracerebral hemorrhage, unspecified    53 yo male with small acute left thalamic hemorrhage, likely hypertensive etiology    - Pt is neurologically stable  - Repeat CTH stable.  - No neurosurgical intervention indicated.  - Medical management per primary team.  - Will sign off, call with any questions.            Lucho Sepulveda MD  Neurosurgery  [facility]

## 2017-07-25 NOTE — PT/OT/SLP PROGRESS
Occupational Therapy  Treatment    Vaughn Retana   MRN: [id number]   Admitting Diagnosis: Nontraumatic intracerebral hemorrhage, unspecified    OT Date of Treatment: 07/25/17   OT Start Time: 1411  OT Stop Time: 1419  OT Total Time (min): 8 min    Billable Minutes:  Therapeutic Activity 8 minutes    General Precautions: Standard, aspiration, fall  Orthopedic Precautions: N/A  Braces: N/A    Do you have any cultural, spiritual, Latter-day conflicts, given your current situation?: none    Subjective:  Communicated with nurse prior to session.  Pt agreeable to skilled OT services.    Pain/Comfort  Pain Rating 1: 0/10  Pain Rating Post-Intervention 1: 0/10    Objective:  Patient found with: pulse ox (continuous), telemetry  Pt received in R side lying. Pt required max encouragement to participate in therapy. Pt was only willing to sit EOB for ~5 minutes. Pt was constantly belching throughout session.      Functional Mobility:  Bed Mobility:  Supine to Sit: Maximum Assistance (pt received in L sidelying )  Sit to Supine: Minimum Assistance (leg management )    Transfers:    Functional Ambulation: did not occur    Balance:   Static Sit: GOOD-: Takes MODERATE challenges from all directions but inconsistently  Dynamic Sit: FAIR+: Maintains balance through MINIMAL excursions of active trunk motion  Static Stand: did not occur   Dynamic stand: did not occur    Therapeutic Activities and Exercises:  Pt sat EOB ~5 minutes at sba.   Pt educated on importance of oob activity.     AM-PAC 6 CLICK ADL   How much help from another person does this patient currently need?   1 = Unable, Total/Dependent Assistance  2 = A lot, Maximum/Moderate Assistance  3 = A little, Minimum/Contact Guard/Supervision  4 = None, Modified [place]/Independent    Putting on and taking off regular lower body clothing? : 1  Bathing (including washing, rinsing, drying)?: 1  Toileting, which includes using toilet, bedpan, or urinal? : 1  Putting on and  "taking off regular upper body clothing?: 1  Taking care of personal grooming such as brushing teeth?: 1  Eating meals?: 1  Total Score: 6     AM-PAC Raw Score CMS "G-Code Modifier Level of Impairment Assistance   6 % Total / Unable   7 - 8 CM 80 - 100% Maximal Assist   9-13 CL 60 - 80% Moderate Assist   14 - 19 CK 40 - 60% Moderate Assist   20 - 22 CJ 20 - 40% Minimal Assist   23 CI 1-20% SBA / CGA   24 CH 0% Independent/ Mod I       Patient left left sidelying with call button in reach    ASSESSMENT:  Vaughn Retana is a 52 y.o. male with a medical diagnosis of Nontraumatic intracerebral hemorrhage, unspecified and presents with impairments listed below. Pt displayed limited tolerance for therapy 2* to not feeling well. Pt would benefit from skilled OT services to improve independence and overall occupational functioning.    Rehab identified problem list/impairments: Rehab identified problem list/impairments: weakness, impaired endurance, impaired self care skills, impaired functional mobilty, gait instability, impaired balance, decreased lower extremity function, decreased coordination, decreased safety awareness, impaired coordination    Rehab potential is good  Activity tolerance: Good    Discharge recommendations: Discharge Facility/Level Of Care Needs: rehabilitation facility     Barriers to discharge: Barriers to Discharge: Inaccessible home environment    Equipment recommendations:       GOALS:    Occupational Therapy Goals        Problem: Occupational Therapy Goal    Goal Priority Disciplines Outcome Interventions   Occupational Therapy Goal     OT, PT/OT Ongoing (interventions implemented as appropriate)    Description:  Goals set 7/24 to be addressed for 14 days with expiration date 8/7:   Patient will increase functional independence with ADLs by performing:    Patient will demonstrate rolling to the right with modified independence  Patient will demonstrate rolling to the left with modified " independence  Patient will demonstrate supine to sit with SBA  Patient will demonstrate stand-pivot transfers with min A  Patient will demonstrate upper body dressing with min A while seated EOB  Patient will demonstrate lower body dressing with min A while seated EOB  Patient will demonstrate grooming while standing mod A  Patient will demonstrate toileting with mod A for managing clothes and hygiene  Patient will demonstrate bathing while seated EOB with mod A  Patient's family/caregiver will demonstrate independence and safety with assisting patient with self-care and functional mobility.  Patient and or patient's family will verbalize understanding of stroke prevention guidelines, personal risk factors and stroke warning signs via teachback method.                       Plan:  Patient to be seen 6 x/week to address the above listed problems via self-care/home management, therapeutic activities, therapeutic exercises, neuromuscular re-education, cognitive retraining, sensory integration  Plan of Care expires: 08/22/17  Plan of Care reviewed with: patient    Raghavendra FEDERICA Sanchez, OT  07/25/2017

## 2017-07-25 NOTE — PHYSICIAN QUERY
PT Name: Vaughn Retana  MR #: [id number]     Physician Query Form - Documentation Clarification      CDS/: EDNA Martinez RN               Contact information: [id number]    This form is a permanent document in the medical record.     Query Date: July 25, 2017    By submitting this query, we are merely seeking further clarification of documentation. Please utilize your independent clinical judgment when addressing the question(s) below.    The Medical record reflects the following:    Supporting Clinical Findings Location in Medical Record       BP: (118-191)/() 138/77     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  PRN labetolol/hydralazine and cardene      H & P 7/24     niCARdipine 40 mg/200 mL infusion 5 mg/hr, Intravenous, 25 mL/hr, Continuous         MAR 7/24                                                                            Doctor, Please specify diagnosis or diagnoses associated with above clinical findings.    Provider Use Only    [    x ]  Hypertensive Emergency     [     ]  Other                                                                                                                           [  ] Clinically undetermined

## 2017-07-25 NOTE — HOSPITAL COURSE
7/24/17:  Evaluated by OT.  Bed mobility mod-totalA.  No transfers.  UBD totalA.  7/25/17:  Bed mobility min-maxA.  Sit to stand totalA (maxA x 2) with RW.  No transfers or gait.  EOB x 5 minutes SBA.  Refused ADLs.  7/26/17:  No PT and OT 2/2 HD.  Stepped down to floor.     AM-PAC 6 CLICK MOBILITY (PT) - Total score: 9 (7/25)  AM-PAC 6 CLICK ADL (OT) - Total score: 6 (7/24), 6 (7/25)    AM-PAC Raw Score Level of Impairment Assistance   6 100% Total / Unable   7 - 8 80 - 100% Maximal Assist   9-13 60 - 80% Moderate Assist   14 - 19 40 - 60% Moderate Assist   20 - 22 20 - 40% Minimal Assist   23 1-20% SBA / CGA   24 0% Independent/ Mod I

## 2017-07-25 NOTE — PT/OT/SLP EVAL
Speech Language Pathology Evaluation    Vaughn Retana   MRN: [id number]   Admitting Diagnosis: Nontraumatic intracerebral hemorrhage, unspecified    Diet recommendations: Solid Diet Level: Dental Soft  Liquid Diet Level: Thin Feed only when awake/alert, No straws, HOB to 90 degrees, Small bites/sips, 1 bite/sip at a time, Check for pocketing/oral residue, Meds whole 1 at a time, Eliminate distractions and Avoid talking while eating   *As nausea improves.  Pt with increased risk for aspiration 2/2 impulsivity with self feeding and obvious nausea though pt denies     SLP Treatment Date: 07/25/17  Speech Start Time: 0735     Speech Stop Time: 0758     Speech Total (min): 23 min       TREATMENT BILLABLE MINUTES:  Eval 10  and Eval Swallow and Oral Function 13    Diagnosis: Nontraumatic intracerebral hemorrhage, unspecified  H/o strokes    Past Medical History:   Diagnosis Date    Amputation stump pain 9/10/2013    Aspiration pneumonia 7/27/2015    Asterixis 11/8/2016    C. difficile colitis 8/7/2015    Cholelithiasis without obstruction 8/25/2015    Chronic diastolic heart failure     2-23-17   1 - Low normal to mildly depressed left ventricular systolic function (EF 50-55%).    2 - Right ventricular enlargement with mildly depressed systolic function.    3 - Left ventricular diastolic dysfunction.    4 - Right atrial enlargement.    5 - Severe tricuspid regurgitation.    6 - Pulmonary hypertension. The estimated PA systolic pressure is 86 mmHg.    7 - Increased central venous pressure.     Chronic low back pain 12/1/2015    Closed head injury 9/8/2016    Diabetic neuropathy     ESRD on hemodialysis 2/7/2013    MWF at [facility]    HCV antibody positive     Normal LFT as of 3/2017    Hemiparesis affecting left side as late effect of stroke 11/08/2016    History of Intracerebral Hemorrhage: L BG 5/2013; R BG 9/2016; R BG 11/2016; L caudate head 2/2017 11/2/2016    Hypertension     left basal ganglia ICH  "5/2013 11/2/2016    Left Caudate Head ICH 2/22/2017 2/24/2017    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 8/1/2015    Metabolic acidosis, IAG, reduced excretion of inorganic acids     Myoclonic jerking 9/20/2016    Secondary hyperparathyroidism (of renal origin)     Secondary pulmonary hypertension 3/23/2017    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    TB lung, latent 08/25/2015    Negative Quantiferon Gold 3-23-17     Past Surgical History:   Procedure Laterality Date    COLONOSCOPY      COLONOSCOPY N/A 4/4/2017    Procedure: COLONOSCOPY;  Surgeon: Walker Stern MD;  Location: [facility] ([address] [place] [address]);  Service: Endoscopy;  Laterality: N/A;  PA Systolic Pressure 85.56. HD Patient MWF, K+ lab prior to procedure.     FOOT AMPUTATION THROUGH METATARSAL      left foot    LEG AMPUTATION THROUGH KNEE  12/18/2013    left BKA    R AVF  9/12/12       Has the patient been evaluated by SLP for swallowing? : Yes  Keep patient NPO?: No   General Precautions: Standard, aspiration, fall, seizure          Social Hx: Patient lives with family.  Has 24 hour supervision, disabled, does not drive per pt    Prior diet: reg/thin.    Subjective:  "I'm not going to gag!"  Pt gagging with cough evident.  Pt denied pain of nausea though belching consistently with intermittent gag; pt unsettled, constantly moving in bed  Patient goals: to eat/drink.    Pain/Comfort  Pain Rating 1: 0/10  Pain Rating Post-Intervention 1: 0/10    Objective:   Patient found with: bed alarm, blood pressure cuff, telemetry, peripheral IV, pulse ox (continuous)    Oral Musculature Evaluation  Oral Musculature: WFL  Dentition: present and adequate  Mucosal Quality: adequate  Mandibular Strength and Mobility: impaired  Oral Labial Strength and Mobility: impaired retraction (mild on R)  Lingual Strength and Mobility: WFL  Volitional Cough: adequate  Volitional Swallow: delayed  Voice Prior to PO Intake: gravely, intermittently " strained-baseline per pt     Cognitive Status:  Behavioral Observations: alert, distractible and inpulsive-  Memory and Orientation: Pt oriented to self, not age and place only.  Immediate recall was WFL up to 5 digits/words, 0/3 unassociated words recalled after delay  Attention: impaired.  Problem Solving: Pt able to complete simple verbal problem solving given increased time and repetitions 2/2 decreased sustained attn to task, distracted by nausea.   Pragmatics: topic maintenance problems  Executive Function: impulsivity, insight/awareness, mental flexibility and organization    Language: some mild dysfluency     Auditory Comprehension: WFL given occasional repeats    Verbal Expression: Pt able to express basic wants/needs, occasional initial sound repetition and perseveration, pt able to self correct     Motor Speech: WFL    Voice: gravely, mildly strained    Reading: tba    Writing: tba    Visual-Spatial: tba    Bedside Swallow Eval:  Consistencies Assessed: thin liquids via spoon, cup and straw x3 cups, puree via tspn x5, isabel cracker portion x4  Oral Phase: impaired rotational chew and other (see comments) mild residue on R lingual surface with solid, cleared with liquid wash  Pharyngeal Phase: coughing/choking and wet vocal quality after swallow-delayed x1 with large cyclical straw sips.  No overt s/s aspiration with single cup sips though consistent cues required to decrease sip size/rate of feeding 2/2 impulsivity.  Following trials, pt gagging with occasional regurgitation and cough.  Pt insisting on additional trials despite nausea, denying discomfort despite obvious difficulty.  Nursing aware and at bedside to provide additional encouragement/education re: POC.  SLP reviewed role of SLP and swallow precautions as well.  Pt verbalized understanding though continued to deny difficulty. White board updated.     FIM:  Social Interaction: 3 Moderate Direction--The patient interacts appropriately 50 to 74%  of the time.   Problem Solvin Minimal Direction--The patient solves routine problems 75 to 90% of the time.   Comprehension: 6 Modified Himrod--In most situations, the patient understands readily or with only mild dificulty complex or abstract directions and conversation.  The patient does not require prompting, though (s)he may require a hearing or visual aid, other x   Expression: 4 Minimal Prompting--The patient expresses basic daily needs and ideas 75 to 90% of the time.   Memory: 3 Moderate Prompting--The patient recognizes and remembers 50 to 74% of the time.     Assessment:  Vaughn Retana is a 52 y.o. male with a SLP diagnosis of Dysphagia and Cognitive-Linguistic Impairment.     Do you have any cultural, spiritual, Islam conflicts, given your current situation?: none reported    Discharge recommendations: Discharge Facility/Level Of Care Needs:  (pending PT/OT)     Goals:    SLP Goals        Problem: SLP Goal    Goal Priority Disciplines Outcome   SLP Goal     SLP Ongoing (interventions implemented as appropriate)   Description:  Speech Language Pathology Goals  Goals expected to be met by   1. Pt will tolerate dental soft diet with thin liquids without overt s/s aspiration.  2. Pt will tolerate trials of regular with adequate oral clearance and no overt s/s aspiration.  3. Pt will Ox4 with min cues and external aids.  4. Pt will complete delayed recall tasks utilizing simple memory strategies with mod cues and 70% accy.  5. Pt will complete further assessment of cognition including sequencing, organization, and reasoning.  6. Pt will complete assessment of reading, writing, visual spatial skills to determine additional need for tx.                          Plan:   Patient to be seen Therapy Frequency: 5 x/week   Plan of Care expires: 17  Plan of Care reviewed with: patient  SLP Follow-up?: Yes         SLP G-Codes  Functional Assessment Tool Used: noms  Score: 5  Functional  Limitations: Swallowing  Swallow Current Status (): DONNA  Swallow Goal Status (): NALDO Lynn, CCC-SLP  07/25/2017

## 2017-07-25 NOTE — SUBJECTIVE & OBJECTIVE
Interval History:    -Repeat CTH in AM, exam stable.   -Continue scheduled reglan/zofran x 24 hours for N/V  -Bowel regimen started, plan for low residual diet.   -Plan to step down in AM to vascular Neurology.           Review of Systems   Respiratory: Negative for apnea, cough, choking, chest tightness, shortness of breath, wheezing and stridor.    Cardiovascular: Negative for chest pain, palpitations and leg swelling.   Gastrointestinal: Positive for nausea and vomiting.   Skin: Negative for color change.   Psychiatric/Behavioral: Negative for agitation.       Objective:     Vitals:  Temp: 98 °F (36.7 °C) (07/25/17 1105)  Pulse: 81 (07/25/17 1205)  Resp: 15 (07/25/17 1205)  BP: 129/74 (07/25/17 1205)  SpO2: 99 % (07/25/17 1205)    Temp:  [97.8 °F (36.6 °C)-98.5 °F (36.9 °C)] 98 °F (36.7 °C)  Pulse:  [] 81  Resp:  [11-62] 15  SpO2:  [92 %-100 %] 99 %  BP: (101-154)/(52-93) 129/74              07/24 0701 - 07/25 0700  In: 1370.2 [I.V.:870.2]  Out: 500     Physical Exam   Cardiovascular: Normal rate, regular rhythm, normal heart sounds and intact distal pulses.    Pulmonary/Chest: Effort normal and breath sounds normal.   Abdominal: Soft. Bowel sounds are normal.   Neurological:   E4 V5 M6  AAO person, place,year  PERRLA, EOMI 4mm/4mm  Right facial weakness  FLORES, follows commands  Mild right hemiparesis/drift   Skin: Skin is warm and dry.   Vitals reviewed.   Skin: Skin is warm.         Medications:  Continuous  nicardipine Last Rate: Stopped (07/25/17 0805)   Scheduled  amlodipine 10 mg QAM   atorvastatin 40 mg Daily   carvedilol 25 mg BID WM   cinacalcet 60 mg Daily with breakfast   lisinopril 40 mg QHS   metoclopramide HCl 10 mg Q6H   ondansetron 4 mg Q6H   polyethylene glycol 17 g Daily   potassium chloride 10 mEq Once   senna-docusate 8.6-50 mg 1 tablet BID   sodium chloride 0.9% 3 mL Q8H   PRN  sodium chloride 0.9%  PRN   acetaminophen 650 mg Q6H PRN   bisacodyl 10 mg Daily PRN   dextrose 50% 12.5 g PRN    dextrose 50% 25 g PRN   glucagon (human recombinant) 1 mg PRN   glucose 16 g PRN   glucose 24 g PRN   hydrALAZINE 10 mg Q4H PRN   insulin aspart 1-10 Units QID (AC + HS) PRN   labetalol 10 mg Q4H PRN   ondansetron 4 mg Q6H PRN     Today I personally reviewed pertinent medications, lines/drains/airways, imaging, cardiology, lab results, microbiology results,

## 2017-07-25 NOTE — SUBJECTIVE & OBJECTIVE
Neurologic Chief Complaint: L thalamic ICH    Subjective:     Interval History: Patient is seen for follow-up neurological assessment and treatment recommendations: NAEON, patient with occasional vomiting, no new complaints    HPI, Past Medical, Family, and Social History remains the same as documented in the initial encounter.     Review of Systems   Constitutional: Negative for chills and fever.   Eyes: Negative for visual disturbance.   Respiratory: Negative for shortness of breath.    Cardiovascular: Negative for chest pain and palpitations.   Gastrointestinal: Positive for nausea and vomiting.   Neurological: Positive for speech difficulty and weakness. Negative for facial asymmetry and numbness.   Psychiatric/Behavioral: Negative for agitation.     Scheduled Meds:   amlodipine  10 mg Oral QAM    atorvastatin  40 mg Oral Daily    carvedilol  25 mg Oral BID WM    cinacalcet  60 mg Oral Daily with breakfast    lisinopril  40 mg Oral QHS    metoclopramide HCl  10 mg Intravenous Q6H    ondansetron  4 mg Intravenous Q6H    pantoprazole  40 mg Oral Daily    polyethylene glycol  17 g Oral Daily    potassium chloride  10 mEq Intravenous Once    senna-docusate 8.6-50 mg  1 tablet Oral BID    sodium chloride 0.9%  3 mL Intravenous Q8H     Continuous Infusions:   nicardipine Stopped (07/25/17 0805)     PRN Meds:sodium chloride 0.9%, acetaminophen, bisacodyl, dextrose 50%, dextrose 50%, glucagon (human recombinant), glucose, glucose, hydrALAZINE, insulin aspart, labetalol, ondansetron    Objective:     Vital Signs (Most Recent):  Temp: 98 °F (36.7 °C) (07/25/17 1105)  Pulse: 81 (07/25/17 1205)  Resp: 15 (07/25/17 1205)  BP: 129/74 (07/25/17 1205)  SpO2: 99 % (07/25/17 1205)  BP Location: Left arm    Vital Signs Range (Last 24H):  Temp:  [97.8 °F (36.6 °C)-98.5 °F (36.9 °C)]   Pulse:  []   Resp:  [11-62]   BP: (101-154)/(52-93)   SpO2:  [92 %-100 %]   BP Location: Left arm    Physical Exam    Constitutional: He appears well-developed and well-nourished. No distress.   HENT:   Head: Normocephalic and atraumatic.   Eyes: EOM are normal. Pupils are equal, round, and reactive to light.   Cardiovascular: Normal rate.    Pulmonary/Chest: Effort normal. No respiratory distress.   Abdominal: He exhibits distension.   Neurological: He is alert.   inattention   Skin: Skin is warm and dry.   Vitals reviewed.      Neurological Exam:   LOC: alert and follows requests  Language: Naming impaired  Speech: No dysarthria  Orientation: Person, Place, Not oriented to time  Memory: Abnormalities: No age correct and No month correct  Visual Fields (CN II): Full  EOM (CN III, IV, VI): Full/intact  Pupils (CN III, IV, VI): PERRL  Facial Sensation (CN V): Symmetric  Facial Movement (CN VII): symmetric facial expression  Hearing (CN VIII): intact bilaterally  Motor*: Arm Left:  Normal (5/5), Leg Left:   Normal (5/5), Arm Right:   Paretic:  4/5, Leg Right:   Paretic:  4/5  Cerebellar*: Normal limb, Normal gait  , Normal stance  Sensation: intact to light touch, temperature and vibration  Tone: Arm-Left: normal; Leg-Left: normal; Arm-Right: normal; Leg-Right: normal    NIH Stroke Scale:    Level of Consciousness: 0 - alert  LOC Questions: 2 - answers none correctly  LOC Commands: 0 - performs both correctly  Best Gaze: 0 - normal  Visual: 0 - no visual loss  Facial Palsy: 0 - normal  Motor Left Arm: 0 - no drift  Motor Right Arm: 1 - drift  Motor Left Le - no drift  Motor Right Le - drift  Limb Ataxia: 0 - absent  Sensory: 0 - normal  Best Language: 1 - mild to moderate aphasia  Dysarthria: 0 - normal articulation  Extinction and Inattention: 0 - no neglect  NIH Stroke Scale Total: 5      Laboratory:  CMP:   Recent Labs  Lab 17  0240   CALCIUM 10.1  10.1   ALBUMIN 3.0*  3.0*   PROT 7.8     140   K 3.7  3.7   CO2 31*  31*   CL 96  96   BUN 33*  33*   CREATININE 9.3*  9.3*   ALKPHOS 185*   ALT 9*   AST  17   BILITOT 0.4     CBC:   Recent Labs  Lab 07/25/17  0240   WBC 8.75   RBC 3.29*   HGB 10.7*   HCT 30.7*      MCV 93   MCH 32.5*   MCHC 34.9     Lipid Panel:   Recent Labs  Lab 07/24/17  1438   CHOL 195   LDLCALC 123.8   HDL 48   TRIG 116     Coagulation:   Recent Labs  Lab 07/25/17  0240   INR 1.1   APTT 23.0     Platelet Aggregation Study: No results for input(s): PLTAGG, PLTAGINTERP, PLTAGREGLACO, ADPPLTAGGREG in the last 168 hours.  Hgb A1C:   Recent Labs  Lab 07/24/17  1137   HGBA1C 5.0     TSH:   Recent Labs  Lab 07/24/17  1137   TSH 1.273       Diagnostic Results:  I have personally reviewed: CT Head. Date: 07/24/17  Findings: Stable acute left thalamic hemorrhage .    Remote right thalamic infarct.    Chronic microvascular ischemic changes.

## 2017-07-25 NOTE — CONSULTS
See consult note from today by Dr. Kapoor.     Mary Buckley, PGY-5  Nephrology Fellow  [facility]  Pager: [phone]

## 2017-07-25 NOTE — SUBJECTIVE & OBJECTIVE
Past Medical History:   Diagnosis Date    Amputation stump pain 9/10/2013    Aspiration pneumonia 7/27/2015    Asterixis 11/8/2016    C. difficile colitis 8/7/2015    Cholelithiasis without obstruction 8/25/2015    Chronic diastolic heart failure     2-23-17   1 - Low normal to mildly depressed left ventricular systolic function (EF 50-55%).    2 - Right ventricular enlargement with mildly depressed systolic function.    3 - Left ventricular diastolic dysfunction.    4 - Right atrial enlargement.    5 - Severe tricuspid regurgitation.    6 - Pulmonary hypertension. The estimated PA systolic pressure is 86 mmHg.    7 - Increased central venous pressure.    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    Metabolic acidosis, IAG, reduced excretion of inorganic acids     Myoclonic jerking 9/20/2016    Secondary hyperparathyroidism (of renal origin)     Secondary pulmonary hypertension 3/23/2017    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 HD Patient MWF, K+ lab prior to procedure.     FOOT AMPUTATION THROUGH METATARSAL      left foot    LEG AMPUTATION THROUGH KNEE  12/18/2013     left BKA    R AVF  9/12/12     Review of patient's allergies indicates:   Allergen Reactions    Fosrenol [lanthanum] Nausea And Vomiting     Nausea and vomiting       Scheduled Medications:    amlodipine  10 mg Oral QAM    atorvastatin  40 mg Oral Daily    carvedilol  25 mg Oral BID WM    cinacalcet  60 mg Oral Daily with breakfast    lisinopril  40 mg Oral QHS    metoclopramide HCl  10 mg Intravenous Q6H    ondansetron  4 mg Intravenous Q6H    polyethylene glycol  17 g Oral Daily    potassium chloride  10 mEq Intravenous Once    senna-docusate 8.6-50 mg  1 tablet Oral BID    sodium chloride 0.9%  3 mL Intravenous Q8H       PRN Medications: sodium chloride 0.9%, acetaminophen, bisacodyl, dextrose 50%, dextrose 50%, glucagon (human recombinant), glucose, glucose, hydrALAZINE, insulin aspart, labetalol, ondansetron    Family History     Problem Relation (Age of Onset)    Alcohol abuse Maternal Grandmother    Diabetes Brother, Maternal Grandfather    Early death Mother    Heart disease Father    Hyperlipidemia Father    Hypertension Father    Kidney disease Father        Social History Main Topics    Smoking status: Former Smoker     Packs/day: 1.00     Years: 10.00    Smokeless tobacco: Never Used    Alcohol use No    Drug use: No    Sexual activity: Not on file     Review of Systems   Constitutional: Negative for chills, fatigue and fever.   HENT: Negative for drooling, hearing loss, trouble swallowing and voice change.    Eyes: Negative for pain and visual disturbance.   Respiratory: Negative for cough, shortness of breath and wheezing.    Cardiovascular: Negative for chest pain and palpitations.   Gastrointestinal: Positive for nausea. Negative for abdominal pain and vomiting.   Endocrine: Positive for polydipsia.   Genitourinary: Negative for difficulty urinating and flank pain.   Musculoskeletal: Negative for arthralgias, back pain, myalgias and neck pain.   Skin: Negative for rash and wound.    Neurological: Positive for dizziness and weakness. Negative for numbness and headaches.   Psychiatric/Behavioral: Negative for agitation and hallucinations. The patient is not nervous/anxious.      Objective:     Vital Signs (Most Recent):  Temp: 98.5 °F (36.9 °C) (17 0705)  Pulse: 81 (17 1005)  Resp: 16 (17 1005)  BP: 101/60 (17 1005)  SpO2: 99 % (17 1005)    Vital Signs (24h Range):  Temp:  [97.8 °F (36.6 °C)-98.5 °F (36.9 °C)] 98.5 °F (36.9 °C)  Pulse:  [] 81  Resp:  [11-62] 16  SpO2:  [92 %-100 %] 99 %  BP: (101-154)/(52-93) 101/60     Body mass index is 24.48 kg/m².    Physical Exam   Constitutional: He appears well-developed and well-nourished. No distress.   HENT:   Head: Normocephalic and atraumatic.   Right Ear: External ear normal.   Left Ear: External ear normal.   Nose: Nose normal.   Eyes: Right eye exhibits no discharge. Left eye exhibits no discharge. No scleral icterus.   Neck: Normal range of motion.   Cardiovascular: Normal rate, regular rhythm and intact distal pulses.    Pulmonary/Chest: Effort normal. No respiratory distress. He has no wheezes.   Abdominal: Soft. He exhibits no distension. There is no tenderness.   Musculoskeletal: Normal range of motion. He exhibits no edema or tenderness.   L BKA   Neurological:   -  Mental Status:  Sleeping.  Opens eyes to voice.  Oriented x 3.  Limited participation.  Follows commands.  Answers correct age and .  -  Speech and language:  no aphasia or dysarthria.  -  Vision:  no hemianopsia or ptosis.  -  Facial movement (CN VII): symmetrical.  -  Coordination:  Unable to assess 2/2 patient participation.  -  Motor:  RUE: 5.  LUE: /.  RLE: .  LLE: /.   Skin: Skin is warm and dry. No rash noted.   Psychiatric: He has a normal mood and affect. His behavior is normal. Thought content normal.   Vitals reviewed.         Diagnostic Results: Labs: Reviewed  X-Ray: Reviewed  CT: Reviewed

## 2017-07-25 NOTE — ASSESSMENT & PLAN NOTE
-  History of multiple ICH/IPH  -  CTH showing L thalamic IPH  -  Hypertensive on arrival, off Cardene gtt  -  Repeat CTH stable  -  Evaluated by Neurosurgery and no acute intervention necessary     Functional status: see hospital course  Cognitive/Speech/Language status:  SLP evaluation pending  Nutrition/Swallow Status:  Passed bedside swallow evaluation.  SLP recommended dental soft diet and thin liquids.    Recommendations  -  Encourage mobility, OOB in chair at least 3 hours per day, and early ambulation as appropriate   -  PT/OT evaluate and treat  -  SLP speech and cognitive evaluate and treat  -  Monitor sleep disturbances and establish consistent sleep-wake cycle  -  Monitor for bowel and bladder dysfunction  -  Monitor for shoulder pain and subluxation  -  Monitor for spasticity  -  Monitor for and prevent skin breakdown and pressure ulcers  · Early mobility, repositioning/weight shifting every 20-30 minutes when sitting, turn patient every 2 hours, proper mattress/overlay and chair cushioning, pressure relief/heel protector boots  -  DVT prophylaxis:  MEL, SCD  -  Reviewed discharge options (IP rehab, SNF, HH therapy, and OP therapy)

## 2017-07-25 NOTE — PLAN OF CARE
Problem: Patient Care Overview  Goal: Plan of Care Review  Outcome: Ongoing (interventions implemented as appropriate)  Nutrition assessment completed. Please see RD note for details.    Recommendation/Intervention:   1. As medically able, advance diet to [place], Renal with texture per SLP recommendations.   -Once N/V resolved, recommend Renal restriction only.      2. If po intake <50% of meals, add Novasource OS to increase caloric intake.      RD to monitor.

## 2017-07-25 NOTE — CONSULTS
"  [facility]  Adult Nutrition  Consult Note    SUMMARY     Recommendations    Recommendation/Intervention:   1. As medically able, advance diet to [place], Renal with texture per SLP recommendations.   -Once N/V resolved, recommend Renal restriction only.     2. If po intake <50% of meals, add Novasource OS to increase caloric intake.     RD to monitor.      Goals: Pt to receive nutrition by RD follow up  Nutrition Goal Status: new  Communication of RD Recs: reviewed with RN    Reason for Assessment    Reason for Assessment: nurse/nurse practitioner consult  Diagnosis: hemorrhage  Relevent Medical History: ESRD on HD, HTN, T2DM         General Information Comments: Pt passed CRESENCIO. SLP recs for dental soft and thin liquids. Pt c/o N/V.    Nutrition Discharge Planning: adequate po intake for optimal nutrition with tolerance    Nutrition Prescription Ordered    Current Diet Order: NPO     Evaluation of Received Nutrients/Fluid Intake  % Intake of Estimated Energy Needs: 0 - 25 %  % Meal Intake: NPO     Nutrition Risk Screen     Nutrition Risk Screen: no indicators present    Nutrition/Diet History       Typical Food/Fluid Intake: Pt remains NPO. C/o N/V           Factors Affecting Nutritional Intake: nausea/vomiting, NPO                Labs/Tests/Procedures/Meds       Pertinent Labs Reviewed: reviewed  Pertinent Labs Comments: BUN 33, Cr 9.3, Ph 2.3, Mg 2.0, HgbA1c 5.0  Pertinent Medications Reviewed: reviewed  Pertinent Medications Comments: statin, KCl, metopclopramide, cardene, insulin    Physical Findings    Overall Physical Appearance: nourished        Skin: intact    Anthropometrics    Temp: 98 °F (36.7 °C)     Height: 5' 6" (167.6 cm)  Weight Method: Stated  Weight: 68.8 kg (151 lb 10.8 oz)  Ideal Body Weight (IBW), Male: 142 lb     % Ideal Body Weight, Male (lb): 121.83 lb     BMI (Calculated): 28  BMI Grade: 25 - 29.9 - overweight     Estimated/Assessed Needs    Weight Used For Calorie " Calculations: 68.8 kg (151 lb 10.8 oz)         Energy Need Method: Kcal/kg        RMR ([place]-St. Jeor Equation): 1475.75        Weight Used For Protein Calculations: 68.8 kg (151 lb 10.8 oz)     Fluid Requirements (mL): 1000mL + UOP     Assessment and Plan    Nutrition Problem  Inadequate energy intake    Related to (etiology):   Inability to consume sufficient energy    Signs and Symptoms (as evidenced by):   NPO, no diet advancement at this time.    Interventi ons/Recommendations (treatment strategy):  See RD recs above.    Nutrition Diagnosis Status:   New      Monitor and Evaluation    Food and Nutrient Intake: energy intake, food and beverage intake  Food and Nutrient Adminstration: diet order        Anthropometric Measurements: weight, weight change, body mass index  Biochemical Data, Medical Tests and Procedures: electrolyte and renal panel, gastrointestinal profile, glucose/endocrine profile, inflammatory profile, lipid profile  Nutrition-Focused Physical Findings: overall appearance    Nutrition Risk    Level of Risk: other (see comments) (f/u 2x/week)    Nutrition Follow-Up    RD Follow-up?: Yes

## 2017-07-25 NOTE — PLAN OF CARE
Problem: Occupational Therapy Goal  Goal: Occupational Therapy Goal  Goals set 7/24 to be addressed for 14 days with expiration date 8/7:   Patient will increase functional independence with ADLs by performing:    Patient will demonstrate rolling to the right with modified independence  Patient will demonstrate rolling to the left with modified independence  Patient will demonstrate supine to sit with SBA  Patient will demonstrate stand-pivot transfers with min A  Patient will demonstrate upper body dressing with min A while seated EOB  Patient will demonstrate lower body dressing with min A while seated EOB  Patient will demonstrate grooming while standing mod A  Patient will demonstrate toileting with mod A for managing clothes and hygiene  Patient will demonstrate bathing while seated EOB with mod A  Patient's family/caregiver will demonstrate independence and safety with assisting patient with self-care and functional mobility.  Patient and or patient's family will verbalize understanding of stroke prevention guidelines, personal risk factors and stroke warning signs via teachback method.      Continue OT POC    Comments: Raghavendra Sanchez OTR/EARLENE  7/25/2017

## 2017-07-25 NOTE — PLAN OF CARE
07/25/17 1311   Discharge Assessment   Assessment Type Discharge Planning Assessment   Confirmed/corrected address and phone number on facesheet? Yes   Assessment information obtained from? Patient   Expected Length of Stay (days) 3   Communicated expected length of stay with patient/caregiver yes   Prior to hospitilization cognitive status: Alert/Oriented   Prior to hospitalization functional status: Independent   Current cognitive status: Alert/Oriented  (Patient has some hesitation answering questions and is unable to say the name of his dialysis company)   Current Functional Status: Needs Assistance   Arrived From admitted as an inpatient   Lives With other relative(s)  (niece)   Able to Return to Prior Arrangements unable to determine at this time (comments)   Is patient able to care for self after discharge? Unable to determine at this time (comments)   How many people do you have in your home that can help with your care after discharge? 1   Who are your caregiver(s) and their phone number(s)? Daisy Von (niece)  [phone], Alicia Retana (sister) [phone]   Patient's perception of discharge disposition home or selfcare   Readmission Within The Last 30 Days current reason for admission unrelated to previous admission   Patient currently being followed by outpatient case management? No   Patient currently receives home health services? No   Does the patient currently use HME? No   Patient currently receives private duty nursing? N/A   Patient currently receives any other outside agency services? No   Equipment Currently Used at Home prosthesis   Do you have any problems affording any of your prescribed medications? No   Is the patient taking medications as prescribed? yes   Do you have any financial concerns preventing you from receiving the healthcare you need? No   Does the patient have transportation to healthcare appointments? Yes   Transportation Available family or friend will provide    On Dialysis? Yes   If yes, what is the name of the dialysis unit? [facility]   Does the patient receive outpatient dialysis? Yes   Does the patient receive services at the Coumadin Clinic? No   Are there any open cases? No   Discharge Plan A Home with family  (outpatient therapy)   Discharge Plan B Home Health;Home with family   Patient/Family In Agreement With Plan yes       Discharge/ My Health Packet Folder Given to patient/family:      Yes        PCP:  Primary Doctor None        Pharmacy:    [facility]/pharmacy #[id number] - [place], [place] - [address] [address]  Phone: [phone] Fax: [phone]    [facility] - [place], [place] - [address] [place] [address]  Phone: [phone] Fax: [phone]  ,    Emergency Contacts:  Extended Emergency Contact Information  Primary Emergency Contact: Alicia Retana   [place]  Mobile Phone: [phone]  Relation: Sister  Secondary Emergency Contact: Daisy Longoria   [facility]  Home Phone: [phone]  Relation: Daughter      Insurance:  Payor: MEDICARE / Plan: MEDICARE PART A & B / Product Type: Blanche /     Mima Berger RN, CCRN-K, [facility]  Neuro-Critical Care   X [phone]

## 2017-07-25 NOTE — ASSESSMENT & PLAN NOTE
51 yo male with small acute left thalamic hemorrhage, likely hypertensive etiology    - Pt is neurologically stable  - Repeat CTH stable.  - No neurosurgical intervention indicated.  - Medical management per primary team.  - Will sign off, call with any questions.

## 2017-07-25 NOTE — ASSESSMENT & PLAN NOTE
Vaughn Reatna is a 52 y.o. male with history of multiple subcortical ICH presents to hospital with dizziness, RSW, and N/V and found to have a L thalamic ICH    Antiplatelets: none, ICH  Statins: none, ICH  SBP <140  DVT ppx: SCDs  PT/OT and speech to evaluate and treat  Neuro checks q1h    Recommend MRI and CTA

## 2017-07-25 NOTE — SUBJECTIVE & OBJECTIVE
Interval History: NAEON.    Medications:  Continuous Infusions:   nicardipine Stopped (07/25/17 0805)     Scheduled Meds:   amlodipine  10 mg Oral QAM    atorvastatin  40 mg Oral Daily    carvedilol  25 mg Oral BID WM    cinacalcet  60 mg Oral Daily with breakfast    lisinopril  40 mg Oral QHS    metoclopramide HCl  10 mg Intravenous Q12H    polyethylene glycol  17 g Oral Daily    potassium chloride  10 mEq Intravenous Once    senna-docusate 8.6-50 mg  1 tablet Oral BID    sodium chloride 0.9%  3 mL Intravenous Q8H     PRN Meds:sodium chloride 0.9%, acetaminophen, dextrose 50%, dextrose 50%, glucagon (human recombinant), glucose, glucose, hydrALAZINE, insulin aspart, labetalol, ondansetron     Review of Systems  Objective:     Weight: 68.8 kg (151 lb 10.8 oz)  Body mass index is 24.48 kg/m².  Vital Signs (Most Recent):  Temp: 98.5 °F (36.9 °C) (07/25/17 0705)  Pulse: 93 (07/25/17 0905)  Resp: 15 (07/25/17 0905)  BP: (!) 101/56 (07/25/17 0905)  SpO2: 100 % (07/25/17 0905) Vital Signs (24h Range):  Temp:  [97.8 °F (36.6 °C)-98.5 °F (36.9 °C)] 98.5 °F (36.9 °C)  Pulse:  [] 93  Resp:  [11-62] 15  SpO2:  [92 %-100 %] 100 %  BP: (101-154)/(52-93) 101/56       Date 07/25/17 0700 - 07/26/17 0659   Shift 4652-6442 3526-3932 9642-0733 24 Hour Total   I  N  T  A  K  E   I.V.  (mL/kg) 50  (0.7)   50  (0.7)    Shift Total  (mL/kg) 50  (0.7)   50  (0.7)   O  U  T  P  U  T   Shift Total  (mL/kg)       Weight (kg) 68.8 68.8 68.8 68.8                        Neurosurgery Physical Exam     General: no distress  Neurologic: Alert and oriented to person, place, not year  Head: normocephalic  GCS: Motor: 6/Verbal: 4/Eyes: 4 GCS Total: 14  Cranial nerves: face symmetric, tongue midline, pupils equal, round, reactive to light with accomodation, extraocular muscles intact  Sensory: response to light touch throughout  Motor Strength: RUE/RLE - 4/5, otherwise full strength on Left upper and lower extremitiesPronator Drift: +  drift on right  Finger to nose normal   Lungs:  normal respiratory effort  Abdomen: soft, non-tender   Extremities: no cyanosis or edema, or clubbing    Significant Labs:    Recent Labs  Lab 07/24/17  0647 07/24/17  1659 07/25/17  0240   * 134* 98  98    141 140  140   K 5.2* 3.3* 3.7  3.7   CL 79* 82* 96  96   CO2 40* 42* 31*  31*   BUN 37* 46* 33*  33*   CREATININE 11.7* 11.8* 9.3*  9.3*   CALCIUM 11.7* 10.7* 10.1  10.1   MG  --   --  2.0       Recent Labs  Lab 07/24/17  0647 07/25/17  0240   WBC 8.65 8.75   HGB 12.7* 10.7*   HCT 37.4* 30.7*    222       Recent Labs  Lab 07/24/17  0854 07/25/17  0240   INR 1.1 1.1   APTT 24.5 23.0     Microbiology Results (last 7 days)     ** No results found for the last 168 hours. **        Significant Diagnostics:  CT: Ct Head Without Contrast    Result Date: 7/24/2017   Stable acute left thalamic hemorrhage . Remote right thalamic infarct. Chronic microvascular ischemic changes. ______________________________________ Electronically signed by resident: AIDE MUNOZ Date:     07/24/17 Time:    15:27 As the supervising and teaching physician, I personally reviewed the images and resident's interpretation and I agree with the findings. Electronically signed by: TIM GARIBAY MD Date:     07/24/17 Time:    15:43

## 2017-07-25 NOTE — PT/OT/SLP EVAL
Physical Therapy  Evaluation and Treatment     Vaughn Retana   MRN: [id number]   Admitting Diagnosis: Nontraumatic intracerebral hemorrhage, unspecified    PT Received On: 07/25/17  PT Start Time: 1100     PT Stop Time: 1135    PT Total Time (min): 35 min       Billable Minutes:  Evaluation 20 and Therapeutic Activity 15    Diagnosis: Nontraumatic intracerebral hemorrhage, unspecified    Comorbidities and personal factors that affect the PT plan of care or the patient's ability to participation or progress with therapy:  1. ESRD on dialysis  2. L BKA with prosthesis (pt reports prosthesis broken)  3. Multiple previous strokes with residual L sided weakness  4. Chronic back pain     Clinical Presentation:  evolving/changing characteristics   Pt evaluated in the ICU with frequent retching, though patient denies nausea or vomiting.  Pt demonstrated decreased awareness of deficits and impulsivity, requiring constant hands-on assistance to maintain safety during evaluation.   He was unaware of deficits and denies any change in function, despite LOB and evaluation findings.     Level of Complexity:   Moderate Complexity:   · At least 1-2 personal factors or comorbidities that impact the plan of care  · Examination addressing at least 3 body structures and functions, activity limitations, and/or participation restrictions  · Clinical presentation with evolving or changing characteristics    Past Medical History:   Diagnosis Date    Amputation stump pain 9/10/2013    Aspiration pneumonia 7/27/2015    Asterixis 11/8/2016    C. difficile colitis 8/7/2015    Cholelithiasis without obstruction 8/25/2015    Chronic diastolic heart failure     2-23-17   1 - Low normal to mildly depressed left ventricular systolic function (EF 50-55%).    2 - Right ventricular enlargement with mildly depressed systolic function.    3 - Left ventricular diastolic dysfunction.    4 - Right atrial enlargement.    5 - Severe tricuspid  regurgitation.    6 - Pulmonary hypertension. The estimated PA systolic pressure is 86 mmHg.    7 - Increased central venous pressure.    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    Metabolic acidosis, IAG, reduced excretion of inorganic acids     Myoclonic jerking 9/20/2016    Secondary hyperparathyroidism (of renal origin)     Secondary pulmonary hypertension 3/23/2017    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    TB lung, latent 08/25/2015    Negative Quantiferon Gold 3-23-17      Past Surgical History:   Procedure Laterality Date    COLONOSCOPY      COLONOSCOPY N/A 4/4/2017    Procedure: COLONOSCOPY;  Surgeon: Walker Stern MD;  Location: [facility] ([address] [place] [address]);  Service: Endoscopy;  Laterality: N/A;  PA Systolic Pressure 85.56. HD Patient MWF, K+ lab prior to procedure.     FOOT AMPUTATION THROUGH METATARSAL      left foot    LEG AMPUTATION THROUGH KNEE  12/18/2013    left BKA    R AVF  9/12/12       Referring physician: Wade Santiago NP  Date referred to PT: 7/24/2017    General Precautions: Standard, aspiration, fall    Patient History:  Living Environment Comment: Pt lives with niece in a 2nd floor apartment with ~1 flight of stairs to enter and bilateralhandrails.  Pt is a questionable historian and contradicted himself regarding his PLOF.  Pt reports he was ambulatory with L prosthesis and cane.  He then stated his prosthesis was broken and unusable.  Pt then stated he stays in the  "bed all of the time.  Pt stated he needs assistance with everything, then reported he does not need assistance with dressing and showers standing up.  Per chart review, patient discharged home with home health ambulating with prosthesis and RW 5 months ago.  Pt owns L prosthetic leg, TTB, hand held shower hose and cane.      Subjective:  Communicated with RN prior to session.  "I came to the hospital for the same reason as before. Nothing is wrong with me. I just need to eat.  The doctor said I can eat."  Chief Complaint: no food tray  Patient goals: "I want to eat"    Pain/Comfort  Pain Rating 1:  (Pt refused to answer question )      Objective:   Patient found with: pulse ox (continuous), telemetry   Patient found resting in bed.  He was agreeable to participate in therapy.       EXAMINATION    Cognitive Function:  Oriented to: person, place, and situation, but not time  Level of Alertness: awake but distractible   Follows Commands/attention: Follows one-step commands  Communication: dysarthria  Safety awareness/insight to disability: impaired, unable to perceive deficits.     Musculoskeletal System  Upper Extremities:  Gross Range of Motion:  Right Upper Extremity: decreased  Left Upper Extremity: WFL    Gross Strength:  Right Upper Extremity: decreased  Left Upper Extremity: WFL    Lower Extremities:  Range of Motion:  Right Lower Extremity: hamstring tightness but no contractures present  Left Lower Extremity: hamstring tightness, but no contractures present    Strength:  Right Lower Extremity: hip 3/5, knee 3/5, ankle 3/5  Left Lower Extremity: hip 4/5, knee 3/5    Muscular Tone:WFL     Integumentary System:  Skin integrity: well healed L BKA     Cardiopulmonary System:  Edema: None noted BLEs    Neuromuscular System:  Sensation:   Light Touch (LT): NT due to patient becoming agitated  Coordination:   · Finger to thumb opposition intact L hand, impaired R hand  · Finger to nose impaired LUE    Balance: "   Static Sit: POOR: Needs MODERATE assist to maintain; see below  Dynamic Sit: POOR: N/A; see below  Static Stand: 0: Needs MAXIMAL assist to maintain ; 2 person assist   Dynamic stand: 0: N/A;     Sitting:  Patient sat EOB x 10 minutes during session.  He demonstrates R LOB with no righting reactions and no awareness of LOB. Pt able to attain midline orientation and erect posture with verbal cues, but unable to hold position.  RUE weight bearing improves sitting balance, but patient demonstrating decreased coordination and use of RUE during sitting. Assistance fluctuated from mod to max assist with sitting.     Posture and gross symmetry: Rounded shoulder, Head forward and bilateral hamstring tightness  Postural Control:   · Stability:poor  · Symmetry: LOB, but able to achieve midline    FUNCTIONAL MOBILITY ASSESSMENT:  Bed Mobility:  Rolling/Turning R: min assist   Rolling/Turning L: min assist   Supine to sit: max assist from R side lying with patient unable to problem solve technique  Sit to supine: min assist for LE management    Scooting EOB: total assist of 2 people     Transfers:  Sit to stand transfer: max assist of 2 people with RW and no prosthesis   Bed to chair transfer: NT 2* decreased balance and safety in sitting     Gait:   NT     THERAPEUTIC ACTIVITIES AND EXERCISES:  Sit to stand transfer:  · Pt requested sit to stand be repeated.   · Sit to stand performed 3x per patient tolerance with mod assist of 2 people using RW  · Pt demonstrated good R knee control, but unable to fully extend R knee in standing  · Pt stood 5 seconds each trial, limited by fatigue     Therapist educated patient on the following:  · Role of PT, POC  · Results of evaluation and deficits  · Pt refused education and became fixated on and agitated about absence of lunch tray.      AM-PAC 6 CLICK MOBILITY  How much help from another person does this patient currently need?   1 = Unable, Total/Dependent Assistance  2 = A lot,  Maximum/Moderate Assistance  3 = A little, Minimum/Contact Guard/Supervision  4 = None, Modified [place]/Independent    Turning over in bed (including adjusting bedclothes, sheets and blankets)?: 3  Sitting down on and standing up from a chair with arms (e.g., wheelchair, bedside commode, etc.): 1  Moving from lying on back to sitting on the side of the bed?: 2  Moving to and from a bed to a chair (including a wheelchair)?: 1  Need to walk in hospital room?: 1  Climbing 3-5 steps with a railing?: 1  Total Score: 9     AM-PAC Raw Score CMS G-Code Modifier Level of Impairment Assistance   6 % Total / Unable   7 - 9 CM 80 - 100% Maximal Assist   10 - 14 CL 60 - 80% Moderate Assist   15 - 19 CK 40 - 60% Moderate Assist   20 - 22 CJ 20 - 40% Minimal Assist   23 CI 1-20% SBA / CGA   24 CH 0% Independent/ Mod I     Patient left right sidelying with all lines intact, call button in reach and RN notified.    Assessment:   Vaughn Retana is a 52 y.o. male with a medical diagnosis of Nontraumatic intracerebral hemorrhage, unspecified.  Pt reports being able to ambulate with LE prosthesis prior to admit, but required assistance for other tasks. Pt now demonstrates R sided weakness, decreased sitting balance, no awareness of LOB or deficits, and decreased RUE coordination.  He requires 1 person assist with bed mobility and 2 person assist for transfers.  Pt is unable to safely attempt gait due to poor standing posture and fatigue.  He would benefit from progressive therapy to improve deficits and maximize functional mobility prior to discharge. Pt reports 1 flight of stairs to enter his residence.     Rehab identified problem list/impairments: Rehab identified problem list/impairments: weakness, impaired self care skills, impaired functional mobilty, impaired endurance, impaired cognition, decreased lower extremity function, decreased upper extremity function, impaired balance, decreased safety awareness,  impaired coordination, impaired muscle length    Rehab potential is fair.    Activity tolerance: Fair    Discharge recommendations: Discharge Facility/Level Of Care Needs: rehabilitation facility     Barriers to discharge: Barriers to Discharge: Inaccessible home environment (steps to enter home)    Equipment recommendations: Equipment Needed After Discharge:  (TBD at next level of care)     GOALS:    Physical Therapy Goals        Problem: Physical Therapy Goal    Goal Priority Disciplines Outcome Goal Variances Interventions   Physical Therapy Goal     PT/OT, PT Ongoing (interventions implemented as appropriate)     Description:  Goals to be met by: 2017     Patient will increase functional independence with mobility by performin. Supine to sit with Stand-by Assistance  2. Sit to supine with Stand-by Assistance  3. Rolling to Left and Right with Stand-by Assistance.  4. Sit to stand transfer with Minimal Assistance using RW  5. Bed to chair transfer with Minimal Assistance using Rolling Walker  6. Gait  x 50 feet with Moderate Assistance using Rolling Walker and prosthetic leg (if available).   7. Ascend/descend 1 flight of stairs with bilateral Handrails and mod assistance to enter home environment.   8. Sitting at edge of bed x15 minutes with Stand-by Assistance and no LOB while performing UE tasks.                       PLAN:    Patient to be seen 6 x/week to address the above listed problems via gait training, therapeutic activities, therapeutic exercises, neuromuscular re-education  Plan of Care expires: 17  Plan of Care reviewed with: patient          Kimberly Guallpa, PT  2017

## 2017-07-25 NOTE — PLAN OF CARE
Problem: SLP Goal  Goal: SLP Goal  Outcome: Ongoing (interventions implemented as appropriate)  Evaluation completed.  Pt tolerated solids and thin liquids via cup without overt s/s aspiration though gagging with subsequent cough noted intermittently t/o evaluation.  Rec advance diet to dental soft with thin liquids, strict aspiration precautions, NO straws when nausea subsides.  NALDO Campuzano, CCC/SLP  7/25/2017

## 2017-07-25 NOTE — PLAN OF CARE
Problem: Patient Care Overview  Goal: Plan of Care Review  Outcome: Ongoing (interventions implemented as appropriate)  POC reviewed with pt and family at 1400. Pt verbalized understanding. Questions and concerns addressed. Continued nausea and vomiting today.  Bowel regimin initiated. Pt progressing toward goals. Will continue to monitor. See flowsheets for full assessment and VS info.

## 2017-07-25 NOTE — ASSESSMENT & PLAN NOTE
Stroke risk factor  LDL >70 on home atorvastatin 40, will need to increase to 80 however okay to hold in setting of acute ICH

## 2017-07-25 NOTE — PLAN OF CARE
Problem: Physical Therapy Goal  Goal: Physical Therapy Goal  Goals to be met by: 2017     Patient will increase functional independence with mobility by performin. Supine to sit with Stand-by Assistance  2. Sit to supine with Stand-by Assistance  3. Rolling to Left and Right with Stand-by Assistance.  4. Sit to stand transfer with Minimal Assistance using RW  5. Bed to chair transfer with Minimal Assistance using Rolling Walker  6. Gait  x 50 feet with Moderate Assistance using Rolling Walker and prosthetic leg (if available).   7. Ascend/descend 1 flight of stairs with bilateral Handrails and mod assistance to enter home environment.   8. Sitting at edge of bed x15 minutes with Stand-by Assistance and no LOB while performing UE tasks.      Outcome: Ongoing (interventions implemented as appropriate)  Initial eval completed. Results, POC and goals discussed with patient.

## 2017-07-26 LAB
ALBUMIN SERPL BCP-MCNC: 3.2 G/DL
ALP SERPL-CCNC: 183 U/L
ALT SERPL W/O P-5'-P-CCNC: 10 U/L
ANION GAP SERPL CALC-SCNC: 18 MMOL/L
APTT BLDCRRT: 23 SEC
AST SERPL-CCNC: 18 U/L
BASOPHILS # BLD AUTO: 0.02 K/UL
BASOPHILS NFR BLD: 0.2 %
BILIRUB SERPL-MCNC: 0.5 MG/DL
BUN SERPL-MCNC: 43 MG/DL
CALCIUM SERPL-MCNC: 9.3 MG/DL
CHLORIDE SERPL-SCNC: 94 MMOL/L
CO2 SERPL-SCNC: 26 MMOL/L
CREAT SERPL-MCNC: 11.7 MG/DL
DIFFERENTIAL METHOD: ABNORMAL
EOSINOPHIL # BLD AUTO: 0.3 K/UL
EOSINOPHIL NFR BLD: 3 %
ERYTHROCYTE [DISTWIDTH] IN BLOOD BY AUTOMATED COUNT: 13.9 %
EST. GFR  (AFRICAN AMERICAN): 5.1 ML/MIN/1.73 M^2
EST. GFR  (NON AFRICAN AMERICAN): 4.4 ML/MIN/1.73 M^2
GLUCOSE SERPL-MCNC: 84 MG/DL
HCT VFR BLD AUTO: 29.2 %
HGB BLD-MCNC: 10 G/DL
INR PPP: 1
LYMPHOCYTES # BLD AUTO: 2.8 K/UL
LYMPHOCYTES NFR BLD: 30 %
MAGNESIUM SERPL-MCNC: 2.1 MG/DL
MCH RBC QN AUTO: 32.1 PG
MCHC RBC AUTO-ENTMCNC: 34.2 G/DL
MCV RBC AUTO: 94 FL
MONOCYTES # BLD AUTO: 1 K/UL
MONOCYTES NFR BLD: 10.9 %
NEUTROPHILS # BLD AUTO: 5.2 K/UL
NEUTROPHILS NFR BLD: 55.7 %
PHOSPHATE SERPL-MCNC: 2.6 MG/DL
PLATELET # BLD AUTO: 230 K/UL
PMV BLD AUTO: 10.3 FL
POCT GLUCOSE: 100 MG/DL (ref 70–110)
POCT GLUCOSE: 104 MG/DL (ref 70–110)
POCT GLUCOSE: 138 MG/DL (ref 70–110)
POCT GLUCOSE: 158 MG/DL (ref 70–110)
POTASSIUM SERPL-SCNC: 3.8 MMOL/L
PROT SERPL-MCNC: 7.9 G/DL
PROTHROMBIN TIME: 11.1 SEC
RBC # BLD AUTO: 3.12 M/UL
SODIUM SERPL-SCNC: 138 MMOL/L
WBC # BLD AUTO: 9.37 K/UL

## 2017-07-26 PROCEDURE — 93010 ELECTROCARDIOGRAM REPORT: CPT | Mod: ,,, | Performed by: INTERNAL MEDICINE

## 2017-07-26 PROCEDURE — 63600175 PHARM REV CODE 636 W HCPCS: Performed by: PHYSICIAN ASSISTANT

## 2017-07-26 PROCEDURE — 92507 TX SP LANG VOICE COMM INDIV: CPT

## 2017-07-26 PROCEDURE — 93005 ELECTROCARDIOGRAM TRACING: CPT

## 2017-07-26 PROCEDURE — 80053 COMPREHEN METABOLIC PANEL: CPT

## 2017-07-26 PROCEDURE — 85610 PROTHROMBIN TIME: CPT

## 2017-07-26 PROCEDURE — A4216 STERILE WATER/SALINE, 10 ML: HCPCS | Performed by: NURSE PRACTITIONER

## 2017-07-26 PROCEDURE — 92526 ORAL FUNCTION THERAPY: CPT

## 2017-07-26 PROCEDURE — 20600001 HC STEP DOWN PRIVATE ROOM

## 2017-07-26 PROCEDURE — 85730 THROMBOPLASTIN TIME PARTIAL: CPT

## 2017-07-26 PROCEDURE — 99233 SBSQ HOSP IP/OBS HIGH 50: CPT | Mod: ,,, | Performed by: PSYCHIATRY & NEUROLOGY

## 2017-07-26 PROCEDURE — 25000003 PHARM REV CODE 250: Performed by: NURSE PRACTITIONER

## 2017-07-26 PROCEDURE — 80100014 HC HEMODIALYSIS 1:1

## 2017-07-26 PROCEDURE — 94761 N-INVAS EAR/PLS OXIMETRY MLT: CPT

## 2017-07-26 PROCEDURE — 99232 SBSQ HOSP IP/OBS MODERATE 35: CPT | Mod: ,,, | Performed by: INTERNAL MEDICINE

## 2017-07-26 PROCEDURE — 83735 ASSAY OF MAGNESIUM: CPT

## 2017-07-26 PROCEDURE — 84100 ASSAY OF PHOSPHORUS: CPT

## 2017-07-26 PROCEDURE — 99232 SBSQ HOSP IP/OBS MODERATE 35: CPT | Mod: ,,, | Performed by: NURSE PRACTITIONER

## 2017-07-26 PROCEDURE — 85025 COMPLETE CBC W/AUTO DIFF WBC: CPT

## 2017-07-26 PROCEDURE — 99233 SBSQ HOSP IP/OBS HIGH 50: CPT | Mod: ,,, | Performed by: NURSE PRACTITIONER

## 2017-07-26 PROCEDURE — 63600175 PHARM REV CODE 636 W HCPCS: Performed by: NURSE PRACTITIONER

## 2017-07-26 RX ORDER — SYRING-NEEDL,DISP,INSUL,0.3 ML 29 G X1/2"
300 SYRINGE, EMPTY DISPOSABLE MISCELLANEOUS
Status: COMPLETED | OUTPATIENT
Start: 2017-07-26 | End: 2017-07-26

## 2017-07-26 RX ORDER — SODIUM CHLORIDE 9 MG/ML
INJECTION, SOLUTION INTRAVENOUS
Status: DISCONTINUED | OUTPATIENT
Start: 2017-07-26 | End: 2017-07-28 | Stop reason: HOSPADM

## 2017-07-26 RX ORDER — SODIUM CHLORIDE 9 MG/ML
INJECTION, SOLUTION INTRAVENOUS ONCE
Status: COMPLETED | OUTPATIENT
Start: 2017-07-26 | End: 2017-07-28

## 2017-07-26 RX ORDER — HEPARIN SODIUM 5000 [USP'U]/ML
5000 INJECTION, SOLUTION INTRAVENOUS; SUBCUTANEOUS EVERY 8 HOURS
Status: DISCONTINUED | OUTPATIENT
Start: 2017-07-26 | End: 2017-07-28 | Stop reason: HOSPADM

## 2017-07-26 RX ORDER — PANTOPRAZOLE SODIUM 40 MG/1
40 TABLET, DELAYED RELEASE ORAL 2 TIMES DAILY
Status: DISCONTINUED | OUTPATIENT
Start: 2017-07-26 | End: 2017-07-28 | Stop reason: HOSPADM

## 2017-07-26 RX ORDER — PSEUDOEPHEDRINE/ACETAMINOPHEN 30MG-500MG
100 TABLET ORAL
Status: COMPLETED | OUTPATIENT
Start: 2017-07-26 | End: 2017-07-26

## 2017-07-26 RX ORDER — SODIUM CHLORIDE 9 MG/ML
INJECTION, SOLUTION INTRAVENOUS ONCE
Status: DISCONTINUED | OUTPATIENT
Start: 2017-07-26 | End: 2017-07-28 | Stop reason: HOSPADM

## 2017-07-26 RX ADMIN — PANTOPRAZOLE SODIUM 40 MG: 40 TABLET, DELAYED RELEASE ORAL at 11:07

## 2017-07-26 RX ADMIN — CARVEDILOL 25 MG: 25 TABLET, FILM COATED ORAL at 07:07

## 2017-07-26 RX ADMIN — ONDANSETRON 8 MG: 2 INJECTION INTRAMUSCULAR; INTRAVENOUS at 10:07

## 2017-07-26 RX ADMIN — CINACALCET HYDROCHLORIDE 60 MG: 60 TABLET, COATED ORAL at 08:07

## 2017-07-26 RX ADMIN — ATORVASTATIN CALCIUM 40 MG: 20 TABLET, FILM COATED ORAL at 08:07

## 2017-07-26 RX ADMIN — PANTOPRAZOLE SODIUM 40 MG: 40 TABLET, DELAYED RELEASE ORAL at 08:07

## 2017-07-26 RX ADMIN — Medication 3 ML: at 02:07

## 2017-07-26 RX ADMIN — Medication 100 ML: at 09:07

## 2017-07-26 RX ADMIN — INSULIN ASPART 1 UNITS: 100 INJECTION, SOLUTION INTRAVENOUS; SUBCUTANEOUS at 11:07

## 2017-07-26 RX ADMIN — MAGESIUM CITRATE 300 ML: 1.75 LIQUID ORAL at 10:07

## 2017-07-26 RX ADMIN — ONDANSETRON 4 MG: 2 INJECTION INTRAMUSCULAR; INTRAVENOUS at 05:07

## 2017-07-26 RX ADMIN — STANDARDIZED SENNA CONCENTRATE AND DOCUSATE SODIUM 1 TABLET: 8.6; 5 TABLET, FILM COATED ORAL at 11:07

## 2017-07-26 RX ADMIN — STANDARDIZED SENNA CONCENTRATE AND DOCUSATE SODIUM 1 TABLET: 8.6; 5 TABLET, FILM COATED ORAL at 08:07

## 2017-07-26 RX ADMIN — LISINOPRIL 40 MG: 20 TABLET ORAL at 11:07

## 2017-07-26 RX ADMIN — METOCLOPRAMIDE 10 MG: 5 INJECTION, SOLUTION INTRAMUSCULAR; INTRAVENOUS at 12:07

## 2017-07-26 RX ADMIN — Medication 3 ML: at 11:07

## 2017-07-26 RX ADMIN — ONDANSETRON 4 MG: 2 INJECTION INTRAMUSCULAR; INTRAVENOUS at 12:07

## 2017-07-26 RX ADMIN — CARVEDILOL 25 MG: 25 TABLET, FILM COATED ORAL at 05:07

## 2017-07-26 RX ADMIN — AMLODIPINE BESYLATE 10 MG: 10 TABLET ORAL at 08:07

## 2017-07-26 RX ADMIN — HEPARIN SODIUM 5000 UNITS: 5000 INJECTION, SOLUTION INTRAVENOUS; SUBCUTANEOUS at 02:07

## 2017-07-26 RX ADMIN — HEPARIN SODIUM 5000 UNITS: 5000 INJECTION, SOLUTION INTRAVENOUS; SUBCUTANEOUS at 11:07

## 2017-07-26 RX ADMIN — ONDANSETRON 8 MG: 2 INJECTION INTRAMUSCULAR; INTRAVENOUS at 12:07

## 2017-07-26 RX ADMIN — POLYETHYLENE GLYCOL 3350 17 G: 17 POWDER, FOR SOLUTION ORAL at 08:07

## 2017-07-26 RX ADMIN — METOCLOPRAMIDE 10 MG: 5 INJECTION, SOLUTION INTRAMUSCULAR; INTRAVENOUS at 05:07

## 2017-07-26 RX ADMIN — SODIUM CHLORIDE 500 ML: 0.9 INJECTION, SOLUTION INTRAVENOUS at 10:07

## 2017-07-26 NOTE — PROGRESS NOTES
[facility]  Vascular Neurology  Comprehensive Stroke Center  Progress Note    Assessment/Plan:     7/25/17-neurologically stable, has some mild aphasia and inattention, N/V remains  07/26/17-neurologically stable, off cardene, plans for step down, patient had enema with large bowel movement this morning. Changed diet to nectar thick liquids per Dr. eMndez    * Nontraumatic intracerebral hemorrhage, unspecified    Vaughn Retana is a 52 y.o. male with history of multiple subcortical ICH presents to hospital with dizziness, RSW, and N/V and found to have a L thalamic ICH    Antiplatelets: none, ICH  Statins: none, ICH  SBP <140  DVT ppx: sq heparin  PT/OT and speech to evaluate and treat  Neuro checks q1h                                            Vasogenic cerebral edema    2/2 ICH  Evident on imaging        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    Stroke risk factor  SBP <140  Off cardene  managed with oral amlodipine, carvedilol, and lisinopril  Management per primary team        Constipation    Patient had bowel movement on 7/26 post enema        Nausea & vomiting    Improving  Continue zofran and reglan  On bland diet           ESRD on hemodialysis    On MWF  Nephrology consulted        Dyslipidemia    Stroke risk factor  LDL >70 on home atorvastatin 40, will need to increase to 80 however okay to hold in setting of acute ICH          Type 2 diabetes mellitus with chronic kidney disease on chronic dialysis    Stroke risk factor  A1C 5.0  Management per primary team            Neurologic Chief Complaint: L thalamic ICH    Subjective:     Interval History: Patient is seen for follow-up neurological assessment and treatment recommendations: NAEON, patient with occasional vomiting but no vomiting this am, patient had bowel movement post enema, no new complaints    HPI, Past Medical, Family, and Social History remains the same as documented in the initial encounter.     Review of  Systems   Constitutional: Negative for chills and fever.   Eyes: Negative for visual disturbance.   Respiratory: Negative for shortness of breath.    Cardiovascular: Negative for chest pain and palpitations.   Gastrointestinal: Negative for nausea and vomiting.   Neurological: Positive for speech difficulty and weakness. Negative for facial asymmetry and numbness.   Psychiatric/Behavioral: Negative for agitation.     Scheduled Meds:   sodium chloride 0.9%   Intravenous Once    amlodipine  10 mg Oral QAM    atorvastatin  40 mg Oral Daily    carvedilol  25 mg Oral BID WM    cinacalcet  60 mg Oral Daily with breakfast    lisinopril  40 mg Oral QHS    metoclopramide HCl  10 mg Intravenous Q6H    pantoprazole  40 mg Oral BID    polyethylene glycol  17 g Oral Daily    potassium chloride  10 mEq Intravenous Once    senna-docusate 8.6-50 mg  1 tablet Oral BID    sodium chloride 0.9%  3 mL Intravenous Q8H     Continuous Infusions:     PRN Meds:sodium chloride 0.9%, acetaminophen, bisacodyl, dextrose 50%, dextrose 50%, glucagon (human recombinant), glucose, glucose, hydrALAZINE, insulin aspart, labetalol, ondansetron    Objective:     Vital Signs (Most Recent):  Temp: 98.5 °F (36.9 °C) (07/26/17 0705)  Pulse: 85 (07/26/17 1005)  Resp: 16 (07/26/17 1005)  BP: 126/76 (07/26/17 1005)  SpO2: 97 % (07/26/17 1005)  BP Location: Left arm    Vital Signs Range (Last 24H):  Temp:  [98 °F (36.7 °C)-98.6 °F (37 °C)]   Pulse:  [79-97]   Resp:  [11-50]   BP: ()/(54-91)   SpO2:  [95 %-100 %]   BP Location: Left arm    Physical Exam   Constitutional: He appears well-developed and well-nourished. No distress.   HENT:   Head: Normocephalic and atraumatic.   Eyes: EOM are normal. Pupils are equal, round, and reactive to light.   Cardiovascular: Normal rate.    Pulmonary/Chest: Effort normal. No respiratory distress.   Abdominal: He exhibits no distension.   Neurological: He is alert.   inattention   Skin: Skin is warm and dry.    Vitals reviewed.      Neurological Exam:   LOC: alert and follows requests  Language: Naming impaired  Speech: No dysarthria  Orientation: Person, Place, Not oriented to time  Memory: Abnormalities: No age correct and No month correct  Visual Fields (CN II): Full  EOM (CN III, IV, VI): Full/intact  Pupils (CN III, IV, VI): PERRL  Facial Sensation (CN V): Symmetric  Facial Movement (CN VII): symmetric facial expression  Hearing (CN VIII): intact bilaterally  Motor*: Arm Left:  Normal (5/5), Leg Left:   Normal (5/5), Arm Right:   Paretic:  4/5, Leg Right:   Paretic:  4/5  Cerebellar*: Normal limb, Normal gait  , Normal stance  Sensation: intact to light touch, temperature and vibration  Tone: Arm-Left: normal; Leg-Left: normal; Arm-Right: normal; Leg-Right: normal    NIH Stroke Scale:    Level of Consciousness: 0 - alert  LOC Questions: 2 - answers none correctly  LOC Commands: 0 - performs both correctly  Best Gaze: 0 - normal  Visual: 0 - no visual loss  Facial Palsy: 0 - normal  Motor Left Arm: 0 - no drift  Motor Right Arm: 1 - drift  Motor Left Le - no drift  Motor Right Le - drift  Limb Ataxia: 0 - absent  Sensory: 0 - normal  Best Language: 1 - mild to moderate aphasia  Dysarthria: 0 - normal articulation  Extinction and Inattention: 0 - no neglect  NIH Stroke Scale Total: 5      Laboratory:  CMP:     Recent Labs  Lab 17  0450   CALCIUM 9.3   ALBUMIN 3.2*   PROT 7.9      K 3.8   CO2 26   CL 94*   BUN 43*   CREATININE 11.7*   ALKPHOS 183*   ALT 10   AST 18   BILITOT 0.5     CBC:     Recent Labs  Lab 17  0450   WBC 9.37   RBC 3.12*   HGB 10.0*   HCT 29.2*      MCV 94   MCH 32.1*   MCHC 34.2     Lipid Panel:     Recent Labs  Lab 17  1438   CHOL 195   LDLCALC 123.8   HDL 48   TRIG 116     Coagulation:     Recent Labs  Lab 17  0450   INR 1.0   APTT 23.0     Platelet Aggregation Study: No results for input(s): PLTAGG, PLTAGINTERP, PLTAGREGLACO, ADPPLTAGGREG in the last  168 hours.  Hgb A1C:     Recent Labs  Lab 07/24/17  1137   HGBA1C 5.0     TSH:     Recent Labs  Lab 07/24/17  1137   TSH 1.273       Diagnostic Results:  I have personally reviewed:     CT Head. Date: 07/24/17  Stable acute left thalamic hemorrhage .    Remote right thalamic infarct.    CT Head. Date: 07/26/17  Relatively unchanged size and configuration of acute left thalamic hemorrhage with jose j-hemorrhage edema and mild localized mass effect on the third ventricle.    Remote right thalamic infarct.    Findings suggestive of chronic microvascular ischemic change.    Echo. Date: 07/24/17  EF 65%  No LAE  Concentric hypertrophy            Caitlyn Carson PA-C  Comprehensive Stroke Center  Department of Vascular Neurology   [facility]

## 2017-07-26 NOTE — SUBJECTIVE & OBJECTIVE
Past Medical History:   Diagnosis Date    Amputation stump pain 9/10/2013    Aspiration pneumonia 7/27/2015    Asterixis 11/8/2016    C. difficile colitis 8/7/2015    Cholelithiasis without obstruction 8/25/2015    Chronic diastolic heart failure     2-23-17   1 - Low normal to mildly depressed left ventricular systolic function (EF 50-55%).    2 - Right ventricular enlargement with mildly depressed systolic function.    3 - Left ventricular diastolic dysfunction.    4 - Right atrial enlargement.    5 - Severe tricuspid regurgitation.    6 - Pulmonary hypertension. The estimated PA systolic pressure is 86 mmHg.    7 - Increased central venous pressure.    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    Metabolic acidosis, IAG, reduced excretion of inorganic acids     Myoclonic jerking 9/20/2016    Secondary hyperparathyroidism (of renal origin)     Secondary pulmonary hypertension 3/23/2017    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    TB lung, latent 08/25/2015    Negative Quantiferon Gold 3-23-17     Past Surgical History:   Procedure Laterality Date    COLONOSCOPY      COLONOSCOPY N/A 4/4/2017    Procedure: COLONOSCOPY;  Surgeon: Walker Stern MD;  Location: [facility] ([address] [place] [address]);  Service: Endoscopy;  Laterality: N/A;  PA Systolic Pressure 85.56. HD Patient MWF, K+ lab prior to procedure.     FOOT AMPUTATION THROUGH METATARSAL      left foot    LEG AMPUTATION THROUGH KNEE  12/18/2013     left BKA    R AVF  9/12/12     Review of patient's allergies indicates:   Allergen Reactions    Fosrenol [lanthanum] Nausea And Vomiting     Nausea and vomiting       Scheduled Medications:    sodium chloride 0.9%   Intravenous Once    amlodipine  10 mg Oral QAM    atorvastatin  40 mg Oral Daily    carvedilol  25 mg Oral BID WM    cinacalcet  60 mg Oral Daily with breakfast    lisinopril  40 mg Oral QHS    metoclopramide HCl  10 mg Intravenous Q6H    pantoprazole  40 mg Oral BID    polyethylene glycol  17 g Oral Daily    potassium chloride  10 mEq Intravenous Once    senna-docusate 8.6-50 mg  1 tablet Oral BID    sodium chloride 0.9%  3 mL Intravenous Q8H       PRN Medications: sodium chloride 0.9%, acetaminophen, bisacodyl, dextrose 50%, dextrose 50%, glucagon (human recombinant), glucose, glucose, hydrALAZINE, insulin aspart, labetalol, ondansetron    Family History     Problem Relation (Age of Onset)    Alcohol abuse Maternal Grandmother    Diabetes Brother, Maternal Grandfather    Early death Mother    Heart disease Father    Hyperlipidemia Father    Hypertension Father    Kidney disease Father        Social History Main Topics    Smoking status: Former Smoker     Packs/day: 1.00     Years: 10.00    Smokeless tobacco: Never Used    Alcohol use No    Drug use: No    Sexual activity: Not on file     Review of Systems   Constitutional: Negative for chills, fatigue and fever.   HENT: Negative for drooling, hearing loss, trouble swallowing and voice change.    Eyes: Negative for pain and visual disturbance.   Respiratory: Negative for cough, shortness of breath and wheezing.    Cardiovascular: Negative for chest pain and palpitations.   Gastrointestinal: Positive for nausea. Negative for abdominal pain and vomiting.   Genitourinary: Negative for difficulty urinating and flank pain.   Musculoskeletal: Negative for arthralgias, back pain, myalgias and neck pain.   Skin: Negative for rash and  wound.   Neurological: Positive for dizziness and weakness. Negative for numbness and headaches.   Psychiatric/Behavioral: Negative for agitation and hallucinations. The patient is not nervous/anxious.      Objective:     Vital Signs (Most Recent):  Temp: 98.5 °F (36.9 °C) (17 0705)  Pulse: 85 (17 1005)  Resp: 16 (17 1005)  BP: 126/76 (17 1005)  SpO2: 97 % (17 1005)    Vital Signs (24h Range):  Temp:  [98 °F (36.7 °C)-98.6 °F (37 °C)] 98.5 °F (36.9 °C)  Pulse:  [79-97] 85  Resp:  [11-50] 16  SpO2:  [95 %-100 %] 97 %  BP: ()/(54-91) 126/76     Body mass index is 24.77 kg/m².    Physical Exam   Constitutional: He appears well-developed and well-nourished. No distress.   HENT:   Head: Normocephalic and atraumatic.   Right Ear: External ear normal.   Left Ear: External ear normal.   Nose: Nose normal.   Eyes: Right eye exhibits no discharge. Left eye exhibits no discharge. No scleral icterus.   Neck: Normal range of motion.   Cardiovascular: Normal rate, regular rhythm and intact distal pulses.    Pulmonary/Chest: Effort normal. No respiratory distress. He has no wheezes.   Abdominal: Soft. He exhibits no distension. There is no tenderness.   Musculoskeletal: Normal range of motion. He exhibits no edema or tenderness.   L BKA   Neurological:   -  Mental Status:  Awake and alert.  Limited participation.  Follows commands.  Answers correct name, age, and .  -  Speech and language:  no aphasia or dysarthria.  -  Vision:  no hemianopsia or ptosis.  -  Facial movement (CN VII): symmetrical.  -  Coordination:  Unable to assess 2/2 patient participation.  -  Motor:  Unable to assess 2/2 patient participation.   Skin: Skin is warm and dry. No rash noted.   Psychiatric: He has a normal mood and affect. Thought content normal. Cognition and memory are impaired.   Vitals reviewed.    Past Medical History:   Diagnosis Date    Amputation stump pain 9/10/2013    Aspiration pneumonia 2015     Asterixis 11/8/2016    C. difficile colitis 8/7/2015    Cholelithiasis without obstruction 8/25/2015    Chronic diastolic heart failure     2-23-17   1 - Low normal to mildly depressed left ventricular systolic function (EF 50-55%).    2 - Right ventricular enlargement with mildly depressed systolic function.    3 - Left ventricular diastolic dysfunction.    4 - Right atrial enlargement.    5 - Severe tricuspid regurgitation.    6 - Pulmonary hypertension. The estimated PA systolic pressure is 86 mmHg.    7 - Increased central venous pressure.    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 HD Patient MWF, K+ lab prior to procedure.     FOOT AMPUTATION THROUGH METATARSAL      left foot    LEG AMPUTATION THROUGH KNEE  12/18/2013    left BKA    R AVF  9/12/12     Review of patient's allergies indicates:   Allergen Reactions    Fosrenol  [lanthanum] Nausea And Vomiting     Nausea and vomiting       Scheduled Medications:    sodium chloride 0.9%   Intravenous Once    amlodipine  10 mg Oral QAM    atorvastatin  40 mg Oral Daily    carvedilol  25 mg Oral BID WM    cinacalcet  60 mg Oral Daily with breakfast    lisinopril  40 mg Oral QHS    metoclopramide HCl  10 mg Intravenous Q6H    pantoprazole  40 mg Oral BID    polyethylene glycol  17 g Oral Daily    potassium chloride  10 mEq Intravenous Once    senna-docusate 8.6-50 mg  1 tablet Oral BID    sodium chloride 0.9%  3 mL Intravenous Q8H       PRN Medications: sodium chloride 0.9%, acetaminophen, bisacodyl, dextrose 50%, dextrose 50%, glucagon (human recombinant), glucose, glucose, hydrALAZINE, insulin aspart, labetalol, ondansetron    Family History     Problem Relation (Age of Onset)    Alcohol abuse Maternal Grandmother    Diabetes Brother, Maternal Grandfather    Early death Mother    Heart disease Father    Hyperlipidemia Father    Hypertension Father    Kidney disease Father        Social History Main Topics    Smoking status: Former Smoker     Packs/day: 1.00     Years: 10.00    Smokeless tobacco: Never Used    Alcohol use No    Drug use: No    Sexual activity: Not on file     Review of Systems   Constitutional: Negative for chills, fatigue and fever.   HENT: Negative for drooling, hearing loss, trouble swallowing and voice change.    Eyes: Negative for pain and visual disturbance.   Respiratory: Negative for cough, shortness of breath and wheezing.    Cardiovascular: Negative for chest pain and palpitations.   Gastrointestinal: Positive for nausea. Negative for abdominal pain and vomiting.   Endocrine: Positive for polydipsia.   Genitourinary: Negative for difficulty urinating and flank pain.   Musculoskeletal: Negative for arthralgias, back pain, myalgias and neck pain.   Skin: Negative for rash and wound.   Neurological: Positive for dizziness and weakness. Negative for  numbness and headaches.   Psychiatric/Behavioral: Negative for agitation and hallucinations. The patient is not nervous/anxious.      Objective:     Vital Signs (Most Recent):  Temp: 98.5 °F (36.9 °C) (17 0705)  Pulse: 85 (17 1005)  Resp: 16 (17 1005)  BP: 126/76 (17 1005)  SpO2: 97 % (17 1005)    Vital Signs (24h Range):  Temp:  [98 °F (36.7 °C)-98.6 °F (37 °C)] 98.5 °F (36.9 °C)  Pulse:  [79-97] 85  Resp:  [11-50] 16  SpO2:  [95 %-100 %] 97 %  BP: ()/(54-91) 126/76     Body mass index is 24.77 kg/m².    Physical Exam   Constitutional: He appears well-developed and well-nourished. No distress.   HENT:   Head: Normocephalic and atraumatic.   Right Ear: External ear normal.   Left Ear: External ear normal.   Nose: Nose normal.   Eyes: Right eye exhibits no discharge. Left eye exhibits no discharge. No scleral icterus.   Neck: Normal range of motion.   Cardiovascular: Normal rate, regular rhythm and intact distal pulses.    Pulmonary/Chest: Effort normal. No respiratory distress. He has no wheezes.   Abdominal: Soft. He exhibits no distension. There is no tenderness.   Musculoskeletal: Normal range of motion. He exhibits no edema or tenderness.   L BKA   Neurological:   -  Mental Status:  Sleeping.  Opens eyes to voice.  Oriented x 3.  Limited participation.  Follows commands.  Answers correct age and .  -  Speech and language:  no aphasia or dysarthria.  -  Vision:  no hemianopsia or ptosis.  -  Facial movement (CN VII): symmetrical.  -  Coordination:  Unable to assess 2/2 patient participation.  -  Motor:  RUE: .  LUE: .  RLE: .  LLE: .   Skin: Skin is warm and dry. No rash noted.   Psychiatric: He has a normal mood and affect. His behavior is normal. Thought content normal.   Vitals reviewed.         Diagnostic Results: Labs: Reviewed  X-Ray: Reviewed  CT: Reviewed         Diagnostic Results:   Labs: Reviewed  X-Ray: Reviewed  CT: Reviewed

## 2017-07-26 NOTE — ASSESSMENT & PLAN NOTE
Stroke risk factor  SBP <140  Off cardene  managed with oral amlodipine, carvedilol, and lisinopril  Management per primary team

## 2017-07-26 NOTE — CARE UPDATE
Hemodialysis complete. 1 liter net fluid removal. Blood returned, needles removed and pressure held x 20 minutes until hemostasis achieved. Gauze pressure dressing applied and secured with tape. Positive bruit/thrill noted.

## 2017-07-26 NOTE — SUBJECTIVE & OBJECTIVE
Interval History: Repeat head CT stable. transferring to stroke per [facility].     Medications:  Continuous Infusions:   Scheduled Meds:   sodium chloride 0.9%   Intravenous Once    amlodipine  10 mg Oral QAM    atorvastatin  40 mg Oral Daily    carvedilol  25 mg Oral BID WM    cinacalcet  60 mg Oral Daily with breakfast    lisinopril  40 mg Oral QHS    pantoprazole  40 mg Oral BID    polyethylene glycol  17 g Oral Daily    potassium chloride  10 mEq Intravenous Once    senna-docusate 8.6-50 mg  1 tablet Oral BID    sodium chloride 0.9%  3 mL Intravenous Q8H     PRN Meds:sodium chloride 0.9%, acetaminophen, bisacodyl, dextrose 50%, dextrose 50%, glucagon (human recombinant), glucose, glucose, hydrALAZINE, insulin aspart, labetalol, ondansetron     Review of Systems  Objective:     Weight: 69.6 kg (153 lb 7 oz)  Body mass index is 24.77 kg/m².  Vital Signs (Most Recent):  Temp: 98 °F (36.7 °C) (07/26/17 1105)  Pulse: 80 (07/26/17 1305)  Resp: 11 (07/26/17 1305)  BP: 122/78 (07/26/17 1305)  SpO2: 99 % (07/26/17 1305) Vital Signs (24h Range):  Temp:  [98 °F (36.7 °C)-98.6 °F (37 °C)] 98 °F (36.7 °C)  Pulse:  [79-97] 80  Resp:  [11-50] 11  SpO2:  [95 %-100 %] 99 %  BP: ()/(54-91) 122/78       Date 07/26/17 0700 - 07/27/17 0659   Shift 2918-7917 6943-1727 3852-8004 24 Hour Total   I  N  T  A  K  E   P.O. 600   600    Shift Total  (mL/kg) 600  (8.6)   600  (8.6)   O  U  T  P  U  T   Shift Total  (mL/kg)       Weight (kg) 69.6 69.6 69.6 69.6                        Neurosurgery Physical Exam  General: no distress  Neurologic: Alert and oriented to person, place, not year  Head: normocephalic  GCS: Motor: 6/Verbal: 4/Eyes: 4 GCS Total: 14  Cranial nerves: face symmetric, tongue midline, pupils equal, round, reactive to light with accomodation, extraocular muscles intact  Sensory: response to light touch throughout  Motor Strength: RUE/RLE - 4/5, otherwise full strength on Left upper and lower extremitiesPronator  Drift: + drift on right  Finger to nose normal   Lungs:  normal respiratory effort  Abdomen: soft, non-tender   Extremities: no cyanosis or edema, or clubbing  Significant Labs:    Recent Labs  Lab 07/24/17  1659 07/25/17  0240 07/26/17  0450   * 98  98 84    140  140 138   K 3.3* 3.7  3.7 3.8   CL 82* 96  96 94*   CO2 42* 31*  31* 26   BUN 46* 33*  33* 43*   CREATININE 11.8* 9.3*  9.3* 11.7*   CALCIUM 10.7* 10.1  10.1 9.3   MG  --  2.0 2.1       Recent Labs  Lab 07/25/17  0240 07/26/17  0450   WBC 8.75 9.37   HGB 10.7* 10.0*   HCT 30.7* 29.2*    230       Recent Labs  Lab 07/25/17  0240 07/26/17  0450   INR 1.1 1.0   APTT 23.0 23.0     Microbiology Results (last 7 days)     ** No results found for the last 168 hours. **        None    Significant Diagnostics:  CT: Ct Head Without Contrast    Result Date: 7/26/2017  1.  Relatively unchanged size and configuration of acute left thalamic hemorrhage with jose j-hemorrhage edema and mild localized mass effect on the third ventricle. 2. Remote right thalamic infarct. 3. Findings suggestive of chronic microvascular ischemic change. Electronically signed by: GERTRUDE QUIROZ Date:     07/26/17 Time:    05:04

## 2017-07-26 NOTE — PLAN OF CARE
Problem: Patient Care Overview  Goal: Plan of Care Review  Outcome: Ongoing (interventions implemented as appropriate)  POC reviewed with pt at 0500. Pt demonstrated and verbalized understanding. Questions and concerns addressed. No acute events overnight. Went to CT with pt connected to cardiac monitoringPt progressing toward goals. Will continue to monitor. See flowsheets for full assessment and VS info

## 2017-07-26 NOTE — PT/OT/SLP PROGRESS
Occupational Therapy      Vaughn Retana  MRN: [id number]    Patient not seen today secondary to Dialysis. Writing therapist attempted pt in PM, but pt receiving Dialysis. Will follow-up as scheduled.    Raghavendra Sanchez OT  7/26/2017

## 2017-07-26 NOTE — NURSING
Patient arrived to floor in bed from Neuro ICU. Oriented to room and how to use nurse call bell. VSS. Denies complaints. AAOx4. Fistula noted to right arm with bruit. Neuro intact.

## 2017-07-26 NOTE — PROGRESS NOTES
[facility]  Physical Medicine & Rehab  Progress Note    Patient Name: Vaughn Retana  MRN: [id number]  Admission Date: 7/24/2017  Length of Stay: 2 days  Attending Physician: Jeff Spencer MD  Collaborating Physician: Rush Jain MD    Inpatient consult to Physical Medicine & Rehabilitation  Consult requested by:  Jeff Spencer MD    Reason for Consult:  assess rehabilitation needs    Subjective:     Principal Problem:Nontraumatic intracerebral hemorrhage, unspecified    Interval History (07/26/2017): Patient is seen for follow-up rehab evaluation and recommendations.  No acute events over night.  Complaining of N/V and constipation.  Enema ordered per NCC.  Limited participation yesterday with therapy.  Barriers for discharge/rehab admission: not ready for discharge.     HPI, Past Medical, Surgical, Family, and Social History remains the same as documented in the initial encounter.    Hospital Course:   7/24/17:  Evaluated by OT.  Bed mobility mod-totalA.  No transfers.  UBD totalA.  7/25/17:  Bed mobility min-maxA.  Sit to stand totalA (maxA x 2) with RW.  No transfers or gait.  EOB x 5 minutes SBA.  Refused ADLs.    AM-PAC 6 CLICK MOBILITY (PT) - Total score: 9 (7/25)  AM-PAC 6 CLICK ADL (OT) - Total score: 6 (7/24), 6 (7/25)    AM-PAC Raw Score Level of Impairment Assistance   6 100% Total / Unable   7 - 8 80 - 100% Maximal Assist   9-13 60 - 80% Moderate Assist   14 - 19 40 - 60% Moderate Assist   20 - 22 20 - 40% Minimal Assist   23 1-20% SBA / CGA   24 0% Independent/ Mod I       Past Medical History:   Diagnosis Date    Amputation stump pain 9/10/2013    Aspiration pneumonia 7/27/2015    Asterixis 11/8/2016    C. difficile colitis 8/7/2015    Cholelithiasis without obstruction 8/25/2015    Chronic diastolic heart failure     2-23-17   1 - Low normal to mildly depressed left ventricular systolic function (EF 50-55%).    2 - Right ventricular enlargement with  mildly depressed systolic function.    3 - Left ventricular diastolic dysfunction.    4 - Right atrial enlargement.    5 - Severe tricuspid regurgitation.    6 - Pulmonary hypertension. The estimated PA systolic pressure is 86 mmHg.    7 - Increased central venous pressure.    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    Metabolic acidosis, IAG, reduced excretion of inorganic acids     Myoclonic jerking 9/20/2016    Secondary hyperparathyroidism (of renal origin)     Secondary pulmonary hypertension 3/23/2017    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    TB lung, latent 08/25/2015    Negative Quantiferon Gold 3-23-17     Past Surgical History:   Procedure Laterality Date    COLONOSCOPY      COLONOSCOPY N/A 4/4/2017    Procedure: COLONOSCOPY;  Surgeon: Walker Stern MD;  Location: [facility] ([address] [place] [address]);  Service: Endoscopy;  Laterality: N/A;  PA Systolic Pressure 85.56. HD Patient MWF, K+ lab prior to procedure.     FOOT AMPUTATION THROUGH METATARSAL      left foot    LEG AMPUTATION THROUGH KNEE  12/18/2013    left BKA    R AVF  9/12/12     Review of patient's allergies indicates:   Allergen Reactions    Fosrenol [lanthanum] Nausea And Vomiting     Nausea and vomiting       Scheduled Medications:    sodium chloride 0.9%   Intravenous Once    amlodipine  10 mg Oral QAM    atorvastatin  40 mg Oral Daily    carvedilol  25 mg Oral BID WM    cinacalcet  60 mg Oral Daily with breakfast     lisinopril  40 mg Oral QHS    metoclopramide HCl  10 mg Intravenous Q6H    pantoprazole  40 mg Oral BID    polyethylene glycol  17 g Oral Daily    potassium chloride  10 mEq Intravenous Once    senna-docusate 8.6-50 mg  1 tablet Oral BID    sodium chloride 0.9%  3 mL Intravenous Q8H       PRN Medications: sodium chloride 0.9%, acetaminophen, bisacodyl, dextrose 50%, dextrose 50%, glucagon (human recombinant), glucose, glucose, hydrALAZINE, insulin aspart, labetalol, ondansetron    Family History     Problem Relation (Age of Onset)    Alcohol abuse Maternal Grandmother    Diabetes Brother, Maternal Grandfather    Early death Mother    Heart disease Father    Hyperlipidemia Father    Hypertension Father    Kidney disease Father        Social History Main Topics    Smoking status: Former Smoker     Packs/day: 1.00     Years: 10.00    Smokeless tobacco: Never Used    Alcohol use No    Drug use: No    Sexual activity: Not on file     Review of Systems   Constitutional: Negative for chills, fatigue and fever.   HENT: Negative for drooling, hearing loss, trouble swallowing and voice change.    Eyes: Negative for pain and visual disturbance.   Respiratory: Negative for cough, shortness of breath and wheezing.    Cardiovascular: Negative for chest pain and palpitations.   Gastrointestinal: Positive for nausea. Negative for abdominal pain and vomiting.   Genitourinary: Negative for difficulty urinating and flank pain.   Musculoskeletal: Negative for arthralgias, back pain, myalgias and neck pain.   Skin: Negative for rash and wound.   Neurological: Positive for dizziness and weakness. Negative for numbness and headaches.   Psychiatric/Behavioral: Negative for agitation and hallucinations. The patient is not nervous/anxious.      Objective:     Vital Signs (Most Recent):  Temp: 98.5 °F (36.9 °C) (07/26/17 0705)  Pulse: 85 (07/26/17 1005)  Resp: 16 (07/26/17 1005)  BP: 126/76 (07/26/17 1005)  SpO2: 97 % (07/26/17  1005)    Vital Signs (24h Range):  Temp:  [98 °F (36.7 °C)-98.6 °F (37 °C)] 98.5 °F (36.9 °C)  Pulse:  [79-97] 85  Resp:  [11-50] 16  SpO2:  [95 %-100 %] 97 %  BP: ()/(54-91) 126/76     Body mass index is 24.77 kg/m².    Physical Exam   Constitutional: He appears well-developed and well-nourished. No distress.   HENT:   Head: Normocephalic and atraumatic.   Right Ear: External ear normal.   Left Ear: External ear normal.   Nose: Nose normal.   Eyes: Right eye exhibits no discharge. Left eye exhibits no discharge. No scleral icterus.   Neck: Normal range of motion.   Cardiovascular: Normal rate, regular rhythm and intact distal pulses.    Pulmonary/Chest: Effort normal. No respiratory distress. He has no wheezes.   Abdominal: Soft. He exhibits no distension. There is no tenderness.   Musculoskeletal: Normal range of motion. He exhibits no edema or tenderness.   L BKA   Neurological:   -  Mental Status:  Awake and alert.  Limited participation.  Follows commands.  Answers correct name, age, and .  -  Speech and language:  no aphasia or dysarthria.  -  Vision:  no hemianopsia or ptosis.  -  Facial movement (CN VII): symmetrical.  -  Coordination:  Unable to assess 2/2 patient participation.  -  Motor:  Unable to assess 2/2 patient participation.   Skin: Skin is warm and dry. No rash noted.   Psychiatric: He has a normal mood and affect. Thought content normal. Cognition and memory are impaired.   Vitals reviewed.    Diagnostic Results:   Labs: Reviewed  X-Ray: Reviewed  CT: Reviewed    Assessment/Plan:      Intraparenchymal hemorrhage of brain    -  See ICH        Vasogenic cerebral edema    -  See ICH        Constipation    -  Bowel regimen started by [facility]  -  X-ray negative for ileus        ESRD on hemodialysis    -  HD MWF  -  Nephrology following         * Nontraumatic intracerebral hemorrhage, unspecified    -  History of multiple ICH/IPH  -  CTH showing L thalamic IPH  -  Hypertensive on arrival, off  Cardene gtt  -  Repeat CTH stable  -  Evaluated by Neurosurgery and no acute intervention necessary     Functional status: see hospital course  Cognitive/Speech/Language status: Cognitive-Linguistic Impairment.   Nutrition/Swallow Status:  Passed bedside swallow evaluation.  SLP recommended dental soft diet and thin liquids.    Recommendations  -  Encourage mobility, OOB in chair at least 3 hours per day, and early ambulation as appropriate   -  PT/OT evaluate and treat  -  SLP speech and cognitive evaluate and treat  -  Monitor sleep disturbances and establish consistent sleep-wake cycle  -  Monitor for bowel and bladder dysfunction  -  Monitor for shoulder pain and subluxation  -  Monitor for spasticity  -  Monitor for and prevent skin breakdown and pressure ulcers  · Early mobility, repositioning/weight shifting every 20-30 minutes when sitting, turn patient every 2 hours, proper mattress/overlay and chair cushioning, pressure relief/heel protector boots  -  DVT prophylaxis:  MEL, SCD  -  Reviewed discharge options (IP rehab, SNF, HH therapy, and OP therapy)        With rehab goals.  Not ready for discharge.  Will follow and discuss with rehab team for final rehab recommendation.    Thank you for your consult.    SEAN Gonzalez  Department of Physical Medicine & Rehab   [facility]

## 2017-07-26 NOTE — ASSESSMENT & PLAN NOTE
A 52 year old  male, he dialyzes MWF and [facility] under the care of Dr. Hubbard.  His EDW is 73 kg.  Treatment duration of 3:45 minutes.  He has AVF to [facility]. Arrived due to ICH on CT without contrast, patient doesn't present volume overload on chest x ray.   - Blood pressure control of ICH to have systolic <140.  - Neuro critical care are agree that patient can be started on HD for removal of toxins, would leave net even in ultrafiltration and see how patient tolerates treatment. Hemodynamically stable, with metabolic alkalosis with respiratory acidosis compensation, will be started on a low biacarb bath, goal is to maintain sodium between 140-145.     - Monitor closely lab results for any change in treatment.   - If persistent vomiting or nausea patient should have trialysis cath and will have SLED instead.

## 2017-07-26 NOTE — PROGRESS NOTES
"[facility]  Neurosurgery  Progress Note    Subjective:     History of Present Illness: 51 yo male with PMhx significant for ESRD on dialysis, previous ICH, HTN, and T2DM.  Pt presented to [facility] this morning after "passing out" at home.  Pt lives at home with sister, he was found down and slightly confused.  Upon arrival to the ED he was hypertensive in the 190's.  HCT revealed acute left thalamic ICH, neurosurgery consulted for evaluation.  Pt currently c/o right sided weakness, and some nausea.  Carroll HAs, visual changes, seizures.  No reported use of anticoagulants at home.      Post-Op Info:  * No surgery found *         Interval History: Repeat head CT stable. transferring to stroke per [facility].     Medications:  Continuous Infusions:   Scheduled Meds:   sodium chloride 0.9%   Intravenous Once    amlodipine  10 mg Oral QAM    atorvastatin  40 mg Oral Daily    carvedilol  25 mg Oral BID WM    cinacalcet  60 mg Oral Daily with breakfast    lisinopril  40 mg Oral QHS    pantoprazole  40 mg Oral BID    polyethylene glycol  17 g Oral Daily    potassium chloride  10 mEq Intravenous Once    senna-docusate 8.6-50 mg  1 tablet Oral BID    sodium chloride 0.9%  3 mL Intravenous Q8H     PRN Meds:sodium chloride 0.9%, acetaminophen, bisacodyl, dextrose 50%, dextrose 50%, glucagon (human recombinant), glucose, glucose, hydrALAZINE, insulin aspart, labetalol, ondansetron     Review of Systems  Objective:     Weight: 69.6 kg (153 lb 7 oz)  Body mass index is 24.77 kg/m².  Vital Signs (Most Recent):  Temp: 98 °F (36.7 °C) (07/26/17 1105)  Pulse: 80 (07/26/17 1305)  Resp: 11 (07/26/17 1305)  BP: 122/78 (07/26/17 1305)  SpO2: 99 % (07/26/17 1305) Vital Signs (24h Range):  Temp:  [98 °F (36.7 °C)-98.6 °F (37 °C)] 98 °F (36.7 °C)  Pulse:  [79-97] 80  Resp:  [11-50] 11  SpO2:  [95 %-100 %] 99 %  BP: ()/(54-91) 122/78       Date 07/26/17 0700 - 07/27/17 0659   Shift 9366-1939 8653-7450 8340-3712 24 " Hour Total   I  N  T  A  K  E   P.O. 600   600    Shift Total  (mL/kg) 600  (8.6)   600  (8.6)   O  U  T  P  U  T   Shift Total  (mL/kg)       Weight (kg) 69.6 69.6 69.6 69.6                        Neurosurgery Physical Exam  General: no distress  Neurologic: Alert and oriented to person, place, not year  Head: normocephalic  GCS: Motor: 6/Verbal: 4/Eyes: 4 GCS Total: 14  Cranial nerves: face symmetric, tongue midline, pupils equal, round, reactive to light with accomodation, extraocular muscles intact  Sensory: response to light touch throughout  Motor Strength: RUE/RLE - 4/5, otherwise full strength on Left upper and lower extremitiesPronator Drift: + drift on right  Finger to nose normal   Lungs:  normal respiratory effort  Abdomen: soft, non-tender   Extremities: no cyanosis or edema, or clubbing  Significant Labs:    Recent Labs  Lab 07/24/17  1659 07/25/17 0240 07/26/17  0450   * 98  98 84    140  140 138   K 3.3* 3.7  3.7 3.8   CL 82* 96  96 94*   CO2 42* 31*  31* 26   BUN 46* 33*  33* 43*   CREATININE 11.8* 9.3*  9.3* 11.7*   CALCIUM 10.7* 10.1  10.1 9.3   MG  --  2.0 2.1       Recent Labs  Lab 07/25/17  0240 07/26/17  0450   WBC 8.75 9.37   HGB 10.7* 10.0*   HCT 30.7* 29.2*    230       Recent Labs  Lab 07/25/17  0240 07/26/17  0450   INR 1.1 1.0   APTT 23.0 23.0     Microbiology Results (last 7 days)     ** No results found for the last 168 hours. **        None    Significant Diagnostics:  CT: Ct Head Without Contrast    Result Date: 7/26/2017  1.  Relatively unchanged size and configuration of acute left thalamic hemorrhage with jose j-hemorrhage edema and mild localized mass effect on the third ventricle. 2. Remote right thalamic infarct. 3. Findings suggestive of chronic microvascular ischemic change. Electronically signed by: GERTRUDE QUIROZ Date:     07/26/17 Time:    05:04     Assessment/Plan:     * Nontraumatic intracerebral hemorrhage, unspecified    53 yo male with small  acute left thalamic hemorrhage, likely hypertensive etiology    - Pt is neurologically stable  - Repeat CTH stable.  - No neurosurgical intervention indicated.  - Medical management per primary team.  - Will sign off, call with any questions.            Jose Francisco macias MD  Neurosurgery  [facility]

## 2017-07-26 NOTE — ASSESSMENT & PLAN NOTE
Vaughn Retana is a 52 y.o. male with history of multiple subcortical ICH presents to hospital with dizziness, RSW, and N/V and found to have a L thalamic ICH    Antiplatelets: none, ICH  Statins: none, ICH  SBP <140  DVT ppx: sq heparin  PT/OT and speech to evaluate and treat  Neuro checks q1h

## 2017-07-26 NOTE — SUBJECTIVE & OBJECTIVE
Interval History:    -Repeat CTH in AM, exam stable.   -Continue scheduled reglan/zofran x 24 hours for N/V  -Bowel regimen started, plan for low residual diet.   -Plan to step down in AM to vascular Neurology.           Review of Systems   Respiratory: Negative for apnea, cough, choking, chest tightness, shortness of breath, wheezing and stridor.    Cardiovascular: Negative for chest pain, palpitations and leg swelling.   Gastrointestinal: Positive for nausea and vomiting. Negative for abdominal distention and abdominal pain.   Skin: Negative for color change.   Psychiatric/Behavioral: Negative for agitation and behavioral problems.       Objective:     Vitals:  Temp: 98.5 °F (36.9 °C) (07/26/17 0705)  Pulse: 94 (07/26/17 0905)  Resp: 16 (07/26/17 0905)  BP: (!) 140/80 (07/26/17 0905)  SpO2: 95 % (07/26/17 0905)    Temp:  [98 °F (36.7 °C)-98.6 °F (37 °C)] 98.5 °F (36.9 °C)  Pulse:  [79-97] 94  Resp:  [11-50] 16  SpO2:  [95 %-100 %] 95 %  BP: ()/(54-91) 140/80              07/25 0701 - 07/26 0700  In: 750 [P.O.:700; I.V.:50]  Out: 300     Physical Exam   Cardiovascular: Normal rate, regular rhythm, normal heart sounds and intact distal pulses.    Pulmonary/Chest: Effort normal and breath sounds normal.   Abdominal: Soft. Bowel sounds are normal.   Neurological:   E4 V5 M6  AAO person, place,year  PERRLA, EOMI 4mm/4mm  Right facial weakness  FLORES, follows commands  Mild right hemiparesis/drift   Skin: Skin is warm and dry.   Vitals reviewed.   Skin: Skin is warm.         Medications:  Continuous Scheduled    sodium chloride 0.9%  Once   amlodipine 10 mg QAM   atorvastatin 40 mg Daily   carvedilol 25 mg BID WM   cinacalcet 60 mg Daily with breakfast   glycerin 99.5% 100 mL ED 1 Time   And     magnesium citrate 300 mL ED 1 Time   And     sodium chloride 0.9% 500 mL ED 1 Time   lisinopril 40 mg QHS   metoclopramide HCl 10 mg Q6H   pantoprazole 40 mg BID   polyethylene glycol 17 g Daily   potassium chloride 10 mEq  Once   senna-docusate 8.6-50 mg 1 tablet BID   sodium chloride 0.9% 3 mL Q8H   PRN    sodium chloride 0.9%  PRN   acetaminophen 650 mg Q6H PRN   bisacodyl 10 mg Daily PRN   dextrose 50% 12.5 g PRN   dextrose 50% 25 g PRN   glucagon (human recombinant) 1 mg PRN   glucose 16 g PRN   glucose 24 g PRN   hydrALAZINE 10 mg Q4H PRN   insulin aspart 1-10 Units QID (AC + HS) PRN   labetalol 10 mg Q4H PRN   ondansetron 8 mg Q8H PRN     Today I personally reviewed pertinent medications, lines/drains/airways, imaging, cardiology, lab results, microbiology results,

## 2017-07-26 NOTE — PROGRESS NOTES
[facility]  Nephrology  Progress Note    Patient Name: Vaughn Retana  MRN: [id number]  Admission Date: 7/24/2017  Hospital Length of Stay: 1 days  Attending Provider: Jeff Spencer MD   Primary Care Physician: Primary Doctor No  Principal Problem:Nontraumatic intracerebral hemorrhage, unspecified    Subjective:     HPI: A 52 year old  male, he dialyzes MWF and [facility] under the care of Dr. Hubbard.  His EDW is 73 kg.  Treatment duration of 3:45 minutes.  He has AVF to [facility]. Patient presented at [facility] after passing out in his home, which was witnessed by his sister, whom refer that was down, right side weakness and slightly confused. Upon arrival to ED presented with hypertension, head CT without contrast showed ICH, as chest x ray dose note no pleural fluid of any substantial volume is seen on either side or cardiac decompensation.     Interval History:   Patient continue with nausea and vomiting. In 2.8 L, output 1.5L for a positive 1.3L.     Review of patient's allergies indicates:   Allergen Reactions    Fosrenol [lanthanum] Nausea And Vomiting     Nausea and vomiting     Current Facility-Administered Medications   Medication Frequency    0.9%  NaCl infusion PRN    acetaminophen tablet 650 mg Q6H PRN    amlodipine tablet 10 mg QAM    atorvastatin tablet 40 mg Daily    bisacodyl suppository 10 mg Daily PRN    carvedilol tablet 25 mg BID WM    cinacalcet tablet 60 mg Daily with breakfast    dextrose 50% injection 12.5 g PRN    dextrose 50% injection 25 g PRN    glucagon (human recombinant) injection 1 mg PRN    glucose chewable tablet 16 g PRN    glucose chewable tablet 24 g PRN    hydrALAZINE injection 10 mg Q4H PRN    insulin aspart pen 1-10 Units QID (AC + HS) PRN    labetalol injection 10 mg Q4H PRN    lisinopril tablet 40 mg QHS    metoclopramide HCl injection 10 mg Q6H    niCARdipine 40 mg/200 mL infusion Continuous    ondansetron  injection 4 mg Q6H PRN    ondansetron injection 4 mg Q6H    pantoprazole EC tablet 40 mg Daily    polyethylene glycol packet 17 g Daily    potassium chloride 10 mEq in 100 mL IVPB Once    senna-docusate 8.6-50 mg per tablet 1 tablet BID    sodium chloride 0.9% flush 3 mL Q8H       Objective:     Vital Signs (Most Recent):  Temp: 98.5 °F (36.9 °C) (07/25/17 1505)  Pulse: 86 (07/25/17 1805)  Resp: 17 (07/25/17 1805)  BP: (!) 101/55 (07/25/17 1805)  SpO2: 98 % (07/25/17 1805)  O2 Device (Oxygen Therapy): room air (07/25/17 1549) Vital Signs (24h Range):  Temp:  [97.8 °F (36.6 °C)-98.5 °F (36.9 °C)] 98.5 °F (36.9 °C)  Pulse:  [] 86  Resp:  [11-52] 17  SpO2:  [92 %-100 %] 98 %  BP: (101-154)/(52-93) 101/55     Weight: 68.8 kg (151 lb 10.8 oz) (07/25/17 0305)  Body mass index is 24.48 kg/m².  Body surface area is 1.79 meters squared.    I/O last 3 completed shifts:  In: 2070.2 [P.O.:650; I.V.:920.2; Other:500]  Out: 700 [Emesis/NG output:200; Other:500]    Physical Exam   Constitutional: He appears well-developed and well-nourished. He is active. He has a sickly appearance. Nasal cannula in place.   HENT:   Head: Normocephalic.   Right Ear: External ear normal.   Left Ear: External ear normal.   Nose: Nose normal.   Mouth/Throat: Mucous membranes are dry.   Eyes: Conjunctivae and EOM are normal. Pupils are equal, round, and reactive to light.   Neck: No hepatojugular reflux and no JVD present. Carotid bruit is not present.   Cardiovascular: Regular rhythm and normal heart sounds.  Tachycardia present.    No murmur heard.  Pulmonary/Chest: No stridor. He has rales in the right lower field and the left lower field.   Abdominal: Soft. Bowel sounds are normal. He exhibits no shifting dullness, no distension and no mass. There is no tenderness. No hernia.   Musculoskeletal:   Left leg bellow Knee amputation, Right leg no edma   Neurological: He is alert.   Psychiatric: His speech is delayed.       Significant  Labs:  ABGs:   Recent Labs  Lab 07/24/17  1749   PH 7.525*   PCO2 59.1*   HCO3 48.8*   POCSATURATED UNAVAILABLE   BE 26     CBC:   Recent Labs  Lab 07/25/17  0240   WBC 8.75   RBC 3.29*   HGB 10.7*   HCT 30.7*      MCV 93   MCH 32.5*   MCHC 34.9     CMP:   Recent Labs  Lab 07/25/17  0240   GLU 98  98   CALCIUM 10.1  10.1   ALBUMIN 3.0*  3.0*   PROT 7.8     140   K 3.7  3.7   CO2 31*  31*   CL 96  96   BUN 33*  33*   CREATININE 9.3*  9.3*   ALKPHOS 185*   ALT 9*   AST 17   BILITOT 0.4     All labs within the past 24 hours have been reviewed.         Assessment/Plan:     ESRD on hemodialysis    A 52 year old  male, he dialyzes MWF and [facility] under the care of Dr. Hubbard.  His EDW is 73 kg.  Treatment duration of 3:45 minutes.  He has AVF to [facility]. Arrived due to ICH on CT without contrast, patient doesn't present volume overload on chest x ray.   - Blood pressure control of ICH to have systolic <140.  - Neuro critical care are agree that patient can be started on HD for removal of toxins, would leave net even in ultrafiltration and see how patient tolerates treatment. Hemodynamically stable, with metabolic alkalosis with respiratory acidosis compensation, will be started on a low biacarb bath, goal is to maintain sodium between 140-145.     - Monitor closely lab results for any change in treatment.   - If persistent vomiting or nausea patient should have trialysis cath and will have SLED instead.                 Nic Kapoor MD  Nephrology  [facility]

## 2017-07-26 NOTE — ASSESSMENT & PLAN NOTE
53 yo male with small acute left thalamic hemorrhage, likely hypertensive etiology    - Pt is neurologically stable  - Repeat CTH stable.  - No neurosurgical intervention indicated.  - Medical management per primary team.  - Will sign off, call with any questions.

## 2017-07-26 NOTE — PROGRESS NOTES
[facility]  Neurocritical Care  Progress Note    Admit Date: 7/24/2017  Service Date: 07/26/2017  Length of Stay: 2    Subjective:     Chief Complaint: Nontraumatic intracerebral hemorrhage, unspecified    History of Present Illness: 53 yo male with PMhx significant for ESRD on dialysis, previous ICH, HTN, and T2DM.  Pt presented to [facility] this morning after being found down.  Pt lives at home with sister, he was found down and slightly confused.  Upon arrival to the ED he was hypertensive in the 190's.  HCT revealed acute left thalamic ICH, neurosurgery consulted for evaluation.  Pt currently c/o right sided weakness, and some nausea.  Carroll HAs, visual changes, seizures.  No reported use of anticoagulants at home.       Hospital Course: -Admit to [facility], pending repeat CTH, SBP <140  -7/25 F/u CTH planned in AM, Neuro Exam stable.   -7/26 Transfer to Stroke Service.     Interval History:    -Repeat CTH in AM is stable, exam stable.   -Continue scheduled reglan/zofran x 24 hours for N/V  -Bowel regimen started, plan for low residual diet. Brown Bomb enema  - step down to vascular Neurology.           Review of Systems   Respiratory: Negative for apnea, cough, choking, chest tightness, shortness of breath, wheezing and stridor.    Cardiovascular: Negative for chest pain, palpitations and leg swelling.   Gastrointestinal: Positive for nausea and vomiting. Negative for abdominal distention and abdominal pain.   Skin: Negative for color change.   Psychiatric/Behavioral: Negative for agitation and behavioral problems.       Objective:     Vitals:  Temp: 98.5 °F (36.9 °C) (07/26/17 0705)  Pulse: 94 (07/26/17 0905)  Resp: 16 (07/26/17 0905)  BP: (!) 140/80 (07/26/17 0905)  SpO2: 95 % (07/26/17 0905)    Temp:  [98 °F (36.7 °C)-98.6 °F (37 °C)] 98.5 °F (36.9 °C)  Pulse:  [79-97] 94  Resp:  [11-50] 16  SpO2:  [95 %-100 %] 95 %  BP: ()/(54-91) 140/80              07/25 0701 - 07/26 0700  In: 750  [P.O.:700; I.V.:50]  Out: 300     Physical Exam   Cardiovascular: Normal rate, regular rhythm, normal heart sounds and intact distal pulses.    Pulmonary/Chest: Effort normal and breath sounds normal.   Abdominal: Soft. Bowel sounds are normal.   Neurological:   E4 V5 M6  AAO person, place,year  PERRLA, EOMI 4mm/4mm  Right facial weakness  FLORES, follows commands  Mild right hemiparesis/drift   Skin: Skin is warm and dry.   Vitals reviewed.   Skin: Skin is warm.         Medications:  Continuous Scheduled    sodium chloride 0.9%  Once   amlodipine 10 mg QAM   atorvastatin 40 mg Daily   carvedilol 25 mg BID WM   cinacalcet 60 mg Daily with breakfast   glycerin 99.5% 100 mL ED 1 Time   And     magnesium citrate 300 mL ED 1 Time   And     sodium chloride 0.9% 500 mL ED 1 Time   lisinopril 40 mg QHS   metoclopramide HCl 10 mg Q6H   pantoprazole 40 mg BID   polyethylene glycol 17 g Daily   potassium chloride 10 mEq Once   senna-docusate 8.6-50 mg 1 tablet BID   sodium chloride 0.9% 3 mL Q8H   PRN    sodium chloride 0.9%  PRN   acetaminophen 650 mg Q6H PRN   bisacodyl 10 mg Daily PRN   dextrose 50% 12.5 g PRN   dextrose 50% 25 g PRN   glucagon (human recombinant) 1 mg PRN   glucose 16 g PRN   glucose 24 g PRN   hydrALAZINE 10 mg Q4H PRN   insulin aspart 1-10 Units QID (AC + HS) PRN   labetalol 10 mg Q4H PRN   ondansetron 8 mg Q8H PRN     Today I personally reviewed pertinent medications, lines/drains/airways, imaging, cardiology, lab results, microbiology results,         Assessment/Plan:     Neuro   Intraparenchymal hemorrhage of brain    Bilateral IPH, Left >Right,   NSGY consulted  Vascular Neurology consulted  Believe d/t hypertension  Repeat CTH stable, no angio d/t renal failure and location specific for HTN hemorrhage,   Coags WNL  No antiplatelet/coagualtion HX  SBP <140, Neuro Checks q1hr  PT/OT/SLP  BP stable, CTH & exam stable. Step down to Vascular Neurology          Cardiac   Malignant hypertension with heart  failure and ESRD    SBP <140  EKG: ST  EF 60-65%  HOme Coreg, lisinopril, Norvasc resumed.   PRN labetolol/hydralazine and cardene          Renal   ESRD on hemodialysis    Last received dialysis on 7/24  Nephrology followin  Plans for HD, Na goal 140-145        Type 2 diabetes mellitus with chronic kidney disease on chronic dialysis    ISS, Q6hr Accuchecks  HgbA1c 5.0  Euglycemia        Fluids/Electrolytes/Nutrition/GI   Constipation    -On bowel regimen  -No BM with Ducc Supp.  -Brown Bomb Enema ordered        Nausea & vomiting    Continue Scheduled zofran/reglan x 24 hours  Consult nutrition for recs on  [place] DIet started  Previous admission for esophagitis?              Prophylaxis:  Venous Thromboembolism: mechanical  Stress Ulcer: None  Ventilator Pneumonia: not applicable     Activity Orders          None        Full Code   Level 3  I spent >35 minutes reviewing patient records, examining, and counseling the patient with greater than 50% of the time spent with direct patient care and coordination.       Wade Burnett NP  Neurocritical Care  [facility]

## 2017-07-26 NOTE — ASSESSMENT & PLAN NOTE
Continue Scheduled zofran/reglan x 24 hours  Consult nutrition for recs on  [place] DIet started  Previous admission for esophagitis?

## 2017-07-26 NOTE — PT/OT/SLP PROGRESS
"Speech Language Pathology  Treatment    Vaughn Retana   MRN: [id number]   Admitting Diagnosis: Nontraumatic intracerebral hemorrhage, unspecified    Diet recommendations: Solid Diet Level: Dental Soft  Liquid Diet Level: Thin aspiration precautions    SLP Treatment Date: 07/26/17  Speech Start Time: 0820     Speech Stop Time: 0840     Speech Total (min): 20 min       TREATMENT BILLABLE MINUTES:  Speech Therapy Individual 12 and Treatment Swallowing Dysfunction 8    Has the patient been evaluated by SLP for swallowing? : Yes  Keep patient NPO?: No   General Precautions: Standard, aspiration, fall          Subjective:  "Where is the meat on this tray?" referring to breakfast tray    Pain/Comfort  Pain Rating 1: 0/10  Pain Rating Post-Intervention 1: 0/10    Objective:   Pt. Seen at bedside and was alert/responsive though confusion persisited.  Pt. Was oriented to place only with poor recall of personal biographical information.  Delays in responding noted on all tasks with need for repetition of stimuli with pt. Often making no response at all.  Fair carry over of orientation info once provided.  Mr. Retana made no response when asked to name category member described .  He recalled nouns in response to questions with 90% accuracy and named one category member with 90% accuracy with min cues and delays in responding noted.   Pt. Was observed during portion of breakfast meal while self feeding breakfast.  Impulsivity noted when eating with large bites and sips taken.  No overt s/s of aspiration noted with no coughing, throat clearing or change in vocal quality following the swallow.         Assessment:  Vaughn Retana is a 52 y.o. male with a medical diagnosis of Nontraumatic intracerebral hemorrhage, unspecified and presents with cog ling impairment.    Discharge recommendations: Discharge Facility/Level Of Care Needs: rehabilitation facility     Goals:    SLP Goals        Problem: SLP Goal    Goal Priority " Disciplines Outcome   SLP Goal     SLP Ongoing (interventions implemented as appropriate)   Description:  Speech Language Pathology Goals  Goals expected to be met by 8/1  1. Pt will tolerate dental soft diet with thin liquids without overt s/s aspiration.  2. Pt will tolerate trials of regular with adequate oral clearance and no overt s/s aspiration.  3. Pt will Ox4 with min cues and external aids.  4. Pt will complete delayed recall tasks utilizing simple memory strategies with mod cues and 70% accy.  5. Pt will complete further assessment of cognition including sequencing, organization, and reasoning.  6. Pt will complete assessment of reading, writing, visual spatial skills to determine additional need for tx.                          Plan:   Patient to be seen Therapy Frequency: 5 x/week   Plan of Care expires: 08/24/17  Plan of Care reviewed with: patient  SLP Follow-up?: Yes  SLP - Next Visit Date: 07/27/17           Malika Quintanilla MA, CCC-SLP  07/26/2017

## 2017-07-26 NOTE — PLAN OF CARE
Problem: SLP Goal  Goal: SLP Goal  Speech Language Pathology Goals  Goals expected to be met by 8/1  1. Pt will tolerate dental soft diet with thin liquids without overt s/s aspiration.  2. Pt will tolerate trials of regular with adequate oral clearance and no overt s/s aspiration.  3. Pt will Ox4 with min cues and external aids.  4. Pt will complete delayed recall tasks utilizing simple memory strategies with mod cues and 70% accy.  5. Pt will complete further assessment of cognition including sequencing, organization, and reasoning.  6. Pt will complete assessment of reading, writing, visual spatial skills to determine additional need for tx.        Outcome: Ongoing (interventions implemented as appropriate)  Pt. Progressing towards goals   Malika Quintanilla MA/PARTH-SLP  Speech Language Pathologist  Pager [phone]  7/26/2017

## 2017-07-26 NOTE — ASSESSMENT & PLAN NOTE
Bilateral IPH, Left >Right,   NSGY consulted  Vascular Neurology consulted  Believe d/t hypertension  Repeat CTH stable, no angio d/t renal failure and location specific for HTN hemorrhage,   Coags WNL  No antiplatelet/coagualtion HX  SBP <140, Neuro Checks q1hr  PT/OT/SLP  BP stable, CTH & exam stable. Step down to Vascular Neurology

## 2017-07-26 NOTE — SUBJECTIVE & OBJECTIVE
Interval History:   Patient continue with nausea and vomiting. In 2.8 L, output 1.5L for a positive 1.3L.     Review of patient's allergies indicates:   Allergen Reactions    Fosrenol [lanthanum] Nausea And Vomiting     Nausea and vomiting     Current Facility-Administered Medications   Medication Frequency    0.9%  NaCl infusion PRN    acetaminophen tablet 650 mg Q6H PRN    amlodipine tablet 10 mg QAM    atorvastatin tablet 40 mg Daily    bisacodyl suppository 10 mg Daily PRN    carvedilol tablet 25 mg BID WM    cinacalcet tablet 60 mg Daily with breakfast    dextrose 50% injection 12.5 g PRN    dextrose 50% injection 25 g PRN    glucagon (human recombinant) injection 1 mg PRN    glucose chewable tablet 16 g PRN    glucose chewable tablet 24 g PRN    hydrALAZINE injection 10 mg Q4H PRN    insulin aspart pen 1-10 Units QID (AC + HS) PRN    labetalol injection 10 mg Q4H PRN    lisinopril tablet 40 mg QHS    metoclopramide HCl injection 10 mg Q6H    niCARdipine 40 mg/200 mL infusion Continuous    ondansetron injection 4 mg Q6H PRN    ondansetron injection 4 mg Q6H    pantoprazole EC tablet 40 mg Daily    polyethylene glycol packet 17 g Daily    potassium chloride 10 mEq in 100 mL IVPB Once    senna-docusate 8.6-50 mg per tablet 1 tablet BID    sodium chloride 0.9% flush 3 mL Q8H       Objective:     Vital Signs (Most Recent):  Temp: 98.5 °F (36.9 °C) (07/25/17 1505)  Pulse: 86 (07/25/17 1805)  Resp: 17 (07/25/17 1805)  BP: (!) 101/55 (07/25/17 1805)  SpO2: 98 % (07/25/17 1805)  O2 Device (Oxygen Therapy): room air (07/25/17 1549) Vital Signs (24h Range):  Temp:  [97.8 °F (36.6 °C)-98.5 °F (36.9 °C)] 98.5 °F (36.9 °C)  Pulse:  [] 86  Resp:  [11-52] 17  SpO2:  [92 %-100 %] 98 %  BP: (101-154)/(52-93) 101/55     Weight: 68.8 kg (151 lb 10.8 oz) (07/25/17 0305)  Body mass index is 24.48 kg/m².  Body surface area is 1.79 meters squared.    I/O last 3 completed shifts:  In: 2070.2 [P.O.:650;  I.V.:920.2; Other:500]  Out: 700 [Emesis/NG output:200; Other:500]    Physical Exam   Constitutional: He appears well-developed and well-nourished. He is active. He has a sickly appearance. Nasal cannula in place.   HENT:   Head: Normocephalic.   Right Ear: External ear normal.   Left Ear: External ear normal.   Nose: Nose normal.   Mouth/Throat: Mucous membranes are dry.   Eyes: Conjunctivae and EOM are normal. Pupils are equal, round, and reactive to light.   Neck: No hepatojugular reflux and no JVD present. Carotid bruit is not present.   Cardiovascular: Regular rhythm and normal heart sounds.  Tachycardia present.    No murmur heard.  Pulmonary/Chest: No stridor. He has rales in the right lower field and the left lower field.   Abdominal: Soft. Bowel sounds are normal. He exhibits no shifting dullness, no distension and no mass. There is no tenderness. No hernia.   Musculoskeletal:   Left leg bellow Knee amputation, Right leg no edma   Neurological: He is alert.   Psychiatric: His speech is delayed.       Significant Labs:  ABGs:   Recent Labs  Lab 07/24/17  1749   PH 7.525*   PCO2 59.1*   HCO3 48.8*   POCSATURATED UNAVAILABLE   BE 26     CBC:   Recent Labs  Lab 07/25/17  0240   WBC 8.75   RBC 3.29*   HGB 10.7*   HCT 30.7*      MCV 93   MCH 32.5*   MCHC 34.9     CMP:   Recent Labs  Lab 07/25/17  0240   GLU 98  98   CALCIUM 10.1  10.1   ALBUMIN 3.0*  3.0*   PROT 7.8     140   K 3.7  3.7   CO2 31*  31*   CL 96  96   BUN 33*  33*   CREATININE 9.3*  9.3*   ALKPHOS 185*   ALT 9*   AST 17   BILITOT 0.4     All labs within the past 24 hours have been reviewed.

## 2017-07-26 NOTE — PT/OT/SLP PROGRESS
Physical Therapy      Vaughn Retana  MRN: [id number]    Therapy attempted to see patient twice. Patient not seen today secondary to dialysis.   Will follow-up at next scheduled treatment session.    Kimberly Guallpa, PT

## 2017-07-26 NOTE — PLAN OF CARE
NATTY spoke with Alicia(sister [phone]) and she has not looked at rehab list yet. SHe said she has not been to the hospital due to work. She will be by tomorrow and look at it and give SW a decision either Thurs or Fri.    Damaris Fair, [facility]  [phone]

## 2017-07-26 NOTE — SUBJECTIVE & OBJECTIVE
Neurologic Chief Complaint: L thalamic ICH    Subjective:     Interval History: Patient is seen for follow-up neurological assessment and treatment recommendations: NAEON, patient with occasional vomiting but no vomiting this am, patient had bowel movement post enema, no new complaints    HPI, Past Medical, Family, and Social History remains the same as documented in the initial encounter.     Review of Systems   Constitutional: Negative for chills and fever.   Eyes: Negative for visual disturbance.   Respiratory: Negative for shortness of breath.    Cardiovascular: Negative for chest pain and palpitations.   Gastrointestinal: Negative for nausea and vomiting.   Neurological: Positive for speech difficulty and weakness. Negative for facial asymmetry and numbness.   Psychiatric/Behavioral: Negative for agitation.     Scheduled Meds:   sodium chloride 0.9%   Intravenous Once    amlodipine  10 mg Oral QAM    atorvastatin  40 mg Oral Daily    carvedilol  25 mg Oral BID WM    cinacalcet  60 mg Oral Daily with breakfast    lisinopril  40 mg Oral QHS    metoclopramide HCl  10 mg Intravenous Q6H    pantoprazole  40 mg Oral BID    polyethylene glycol  17 g Oral Daily    potassium chloride  10 mEq Intravenous Once    senna-docusate 8.6-50 mg  1 tablet Oral BID    sodium chloride 0.9%  3 mL Intravenous Q8H     Continuous Infusions:     PRN Meds:sodium chloride 0.9%, acetaminophen, bisacodyl, dextrose 50%, dextrose 50%, glucagon (human recombinant), glucose, glucose, hydrALAZINE, insulin aspart, labetalol, ondansetron    Objective:     Vital Signs (Most Recent):  Temp: 98.5 °F (36.9 °C) (07/26/17 0705)  Pulse: 85 (07/26/17 1005)  Resp: 16 (07/26/17 1005)  BP: 126/76 (07/26/17 1005)  SpO2: 97 % (07/26/17 1005)  BP Location: Left arm    Vital Signs Range (Last 24H):  Temp:  [98 °F (36.7 °C)-98.6 °F (37 °C)]   Pulse:  [79-97]   Resp:  [11-50]   BP: ()/(54-91)   SpO2:  [95 %-100 %]   BP Location: Left  arm    Physical Exam   Constitutional: He appears well-developed and well-nourished. No distress.   HENT:   Head: Normocephalic and atraumatic.   Eyes: EOM are normal. Pupils are equal, round, and reactive to light.   Cardiovascular: Normal rate.    Pulmonary/Chest: Effort normal. No respiratory distress.   Abdominal: He exhibits no distension.   Neurological: He is alert.   inattention   Skin: Skin is warm and dry.   Vitals reviewed.      Neurological Exam:   LOC: alert and follows requests  Language: Naming impaired  Speech: No dysarthria  Orientation: Person, Place, Not oriented to time  Memory: Abnormalities: No age correct and No month correct  Visual Fields (CN II): Full  EOM (CN III, IV, VI): Full/intact  Pupils (CN III, IV, VI): PERRL  Facial Sensation (CN V): Symmetric  Facial Movement (CN VII): symmetric facial expression  Hearing (CN VIII): intact bilaterally  Motor*: Arm Left:  Normal (5/5), Leg Left:   Normal (5/5), Arm Right:   Paretic:  4/5, Leg Right:   Paretic:  4/5  Cerebellar*: Normal limb, Normal gait  , Normal stance  Sensation: intact to light touch, temperature and vibration  Tone: Arm-Left: normal; Leg-Left: normal; Arm-Right: normal; Leg-Right: normal    NIH Stroke Scale:    Level of Consciousness: 0 - alert  LOC Questions: 2 - answers none correctly  LOC Commands: 0 - performs both correctly  Best Gaze: 0 - normal  Visual: 0 - no visual loss  Facial Palsy: 0 - normal  Motor Left Arm: 0 - no drift  Motor Right Arm: 1 - drift  Motor Left Le - no drift  Motor Right Le - drift  Limb Ataxia: 0 - absent  Sensory: 0 - normal  Best Language: 1 - mild to moderate aphasia  Dysarthria: 0 - normal articulation  Extinction and Inattention: 0 - no neglect  NIH Stroke Scale Total: 5      Laboratory:  CMP:     Recent Labs  Lab 17  0450   CALCIUM 9.3   ALBUMIN 3.2*   PROT 7.9      K 3.8   CO2 26   CL 94*   BUN 43*   CREATININE 11.7*   ALKPHOS 183*   ALT 10   AST 18   BILITOT 0.5     CBC:      Recent Labs  Lab 07/26/17  0450   WBC 9.37   RBC 3.12*   HGB 10.0*   HCT 29.2*      MCV 94   MCH 32.1*   MCHC 34.2     Lipid Panel:     Recent Labs  Lab 07/24/17  1438   CHOL 195   LDLCALC 123.8   HDL 48   TRIG 116     Coagulation:     Recent Labs  Lab 07/26/17  0450   INR 1.0   APTT 23.0     Platelet Aggregation Study: No results for input(s): PLTAGG, PLTAGINTERP, PLTAGREGLACO, ADPPLTAGGREG in the last 168 hours.  Hgb A1C:     Recent Labs  Lab 07/24/17  1137   HGBA1C 5.0     TSH:     Recent Labs  Lab 07/24/17  1137   TSH 1.273       Diagnostic Results:  I have personally reviewed:     CT Head. Date: 07/24/17  Stable acute left thalamic hemorrhage .    Remote right thalamic infarct.    CT Head. Date: 07/26/17  Relatively unchanged size and configuration of acute left thalamic hemorrhage with jose j-hemorrhage edema and mild localized mass effect on the third ventricle.    Remote right thalamic infarct.    Findings suggestive of chronic microvascular ischemic change.    Echo. Date: 07/24/17  EF 65%  No LAE  Concentric hypertrophy

## 2017-07-27 PROBLEM — D72.829 LEUKOCYTOSIS: Status: ACTIVE | Noted: 2017-07-27

## 2017-07-27 LAB
ALBUMIN SERPL BCP-MCNC: 3.1 G/DL
ALP SERPL-CCNC: 179 U/L
ALT SERPL W/O P-5'-P-CCNC: 10 U/L
AMYLASE SERPL-CCNC: 101 U/L
ANION GAP SERPL CALC-SCNC: 14 MMOL/L
APTT BLDCRRT: 25.6 SEC
AST SERPL-CCNC: 20 U/L
BASOPHILS # BLD AUTO: 0.01 K/UL
BASOPHILS NFR BLD: 0.1 %
BILIRUB SERPL-MCNC: 0.5 MG/DL
BUN SERPL-MCNC: 32 MG/DL
CALCIUM SERPL-MCNC: 8.5 MG/DL
CHLORIDE SERPL-SCNC: 100 MMOL/L
CO2 SERPL-SCNC: 23 MMOL/L
CREAT SERPL-MCNC: 9.1 MG/DL
DIFFERENTIAL METHOD: ABNORMAL
EOSINOPHIL # BLD AUTO: 0.1 K/UL
EOSINOPHIL NFR BLD: 0.6 %
ERYTHROCYTE [DISTWIDTH] IN BLOOD BY AUTOMATED COUNT: 13.8 %
EST. GFR  (AFRICAN AMERICAN): 6.9 ML/MIN/1.73 M^2
EST. GFR  (NON AFRICAN AMERICAN): 5.9 ML/MIN/1.73 M^2
GLUCOSE SERPL-MCNC: 108 MG/DL
HCT VFR BLD AUTO: 28.3 %
HGB BLD-MCNC: 9.7 G/DL
INR PPP: 1
LIPASE SERPL-CCNC: 23 U/L
LYMPHOCYTES # BLD AUTO: 2 K/UL
LYMPHOCYTES NFR BLD: 15.2 %
MAGNESIUM SERPL-MCNC: 2.3 MG/DL
MCH RBC QN AUTO: 32 PG
MCHC RBC AUTO-ENTMCNC: 34.3 G/DL
MCV RBC AUTO: 93 FL
MONOCYTES # BLD AUTO: 1.3 K/UL
MONOCYTES NFR BLD: 9.6 %
NEUTROPHILS # BLD AUTO: 9.8 K/UL
NEUTROPHILS NFR BLD: 74.1 %
PHOSPHATE SERPL-MCNC: 2.7 MG/DL
PLATELET # BLD AUTO: 185 K/UL
PMV BLD AUTO: 10.2 FL
POCT GLUCOSE: 114 MG/DL (ref 70–110)
POCT GLUCOSE: 118 MG/DL (ref 70–110)
POCT GLUCOSE: 138 MG/DL (ref 70–110)
POCT GLUCOSE: 175 MG/DL (ref 70–110)
POCT GLUCOSE: 184 MG/DL (ref 70–110)
POTASSIUM SERPL-SCNC: 3.8 MMOL/L
PROCALCITONIN SERPL IA-MCNC: 1.32 NG/ML
PROT SERPL-MCNC: 7.6 G/DL
PROTHROMBIN TIME: 11.1 SEC
RBC # BLD AUTO: 3.03 M/UL
SODIUM SERPL-SCNC: 137 MMOL/L
WBC # BLD AUTO: 13.26 K/UL

## 2017-07-27 PROCEDURE — 85730 THROMBOPLASTIN TIME PARTIAL: CPT

## 2017-07-27 PROCEDURE — 97110 THERAPEUTIC EXERCISES: CPT

## 2017-07-27 PROCEDURE — 99233 SBSQ HOSP IP/OBS HIGH 50: CPT | Mod: GC,,, | Performed by: PSYCHIATRY & NEUROLOGY

## 2017-07-27 PROCEDURE — 92507 TX SP LANG VOICE COMM INDIV: CPT

## 2017-07-27 PROCEDURE — 25000003 PHARM REV CODE 250: Performed by: STUDENT IN AN ORGANIZED HEALTH CARE EDUCATION/TRAINING PROGRAM

## 2017-07-27 PROCEDURE — 63600175 PHARM REV CODE 636 W HCPCS: Performed by: PHYSICIAN ASSISTANT

## 2017-07-27 PROCEDURE — 83690 ASSAY OF LIPASE: CPT

## 2017-07-27 PROCEDURE — 99232 SBSQ HOSP IP/OBS MODERATE 35: CPT | Mod: ,,, | Performed by: NURSE PRACTITIONER

## 2017-07-27 PROCEDURE — 85025 COMPLETE CBC W/AUTO DIFF WBC: CPT

## 2017-07-27 PROCEDURE — 92526 ORAL FUNCTION THERAPY: CPT

## 2017-07-27 PROCEDURE — A4216 STERILE WATER/SALINE, 10 ML: HCPCS | Performed by: NURSE PRACTITIONER

## 2017-07-27 PROCEDURE — 84100 ASSAY OF PHOSPHORUS: CPT

## 2017-07-27 PROCEDURE — 93970 EXTREMITY STUDY: CPT | Performed by: SURGERY

## 2017-07-27 PROCEDURE — 87040 BLOOD CULTURE FOR BACTERIA: CPT

## 2017-07-27 PROCEDURE — 84145 PROCALCITONIN (PCT): CPT

## 2017-07-27 PROCEDURE — 87040 BLOOD CULTURE FOR BACTERIA: CPT | Mod: 59

## 2017-07-27 PROCEDURE — 97530 THERAPEUTIC ACTIVITIES: CPT

## 2017-07-27 PROCEDURE — 20600001 HC STEP DOWN PRIVATE ROOM

## 2017-07-27 PROCEDURE — 82150 ASSAY OF AMYLASE: CPT

## 2017-07-27 PROCEDURE — 85610 PROTHROMBIN TIME: CPT

## 2017-07-27 PROCEDURE — 97112 NEUROMUSCULAR REEDUCATION: CPT

## 2017-07-27 PROCEDURE — 63600175 PHARM REV CODE 636 W HCPCS: Performed by: NURSE PRACTITIONER

## 2017-07-27 PROCEDURE — 80053 COMPREHEN METABOLIC PANEL: CPT

## 2017-07-27 PROCEDURE — 36415 COLL VENOUS BLD VENIPUNCTURE: CPT

## 2017-07-27 PROCEDURE — 83735 ASSAY OF MAGNESIUM: CPT

## 2017-07-27 PROCEDURE — 25000003 PHARM REV CODE 250: Performed by: NURSE PRACTITIONER

## 2017-07-27 RX ORDER — ATORVASTATIN CALCIUM 20 MG/1
80 TABLET, FILM COATED ORAL DAILY
Status: DISCONTINUED | OUTPATIENT
Start: 2017-07-28 | End: 2017-07-28 | Stop reason: HOSPADM

## 2017-07-27 RX ORDER — GABAPENTIN 100 MG/1
100 CAPSULE ORAL DAILY
Status: DISCONTINUED | OUTPATIENT
Start: 2017-07-27 | End: 2017-07-28 | Stop reason: HOSPADM

## 2017-07-27 RX ADMIN — CARVEDILOL 25 MG: 25 TABLET, FILM COATED ORAL at 08:07

## 2017-07-27 RX ADMIN — PANTOPRAZOLE SODIUM 40 MG: 40 TABLET, DELAYED RELEASE ORAL at 08:07

## 2017-07-27 RX ADMIN — AMLODIPINE BESYLATE 10 MG: 10 TABLET ORAL at 06:07

## 2017-07-27 RX ADMIN — Medication 3 ML: at 06:07

## 2017-07-27 RX ADMIN — INSULIN ASPART 2 UNITS: 100 INJECTION, SOLUTION INTRAVENOUS; SUBCUTANEOUS at 11:07

## 2017-07-27 RX ADMIN — ATORVASTATIN CALCIUM 40 MG: 20 TABLET, FILM COATED ORAL at 08:07

## 2017-07-27 RX ADMIN — HEPARIN SODIUM 5000 UNITS: 5000 INJECTION, SOLUTION INTRAVENOUS; SUBCUTANEOUS at 06:07

## 2017-07-27 RX ADMIN — CARVEDILOL 25 MG: 25 TABLET, FILM COATED ORAL at 04:07

## 2017-07-27 RX ADMIN — HEPARIN SODIUM 5000 UNITS: 5000 INJECTION, SOLUTION INTRAVENOUS; SUBCUTANEOUS at 01:07

## 2017-07-27 RX ADMIN — LISINOPRIL 40 MG: 20 TABLET ORAL at 09:07

## 2017-07-27 RX ADMIN — GABAPENTIN 100 MG: 100 CAPSULE ORAL at 12:07

## 2017-07-27 RX ADMIN — HEPARIN SODIUM 5000 UNITS: 5000 INJECTION, SOLUTION INTRAVENOUS; SUBCUTANEOUS at 09:07

## 2017-07-27 RX ADMIN — PANTOPRAZOLE SODIUM 40 MG: 40 TABLET, DELAYED RELEASE ORAL at 09:07

## 2017-07-27 RX ADMIN — Medication 3 ML: at 09:07

## 2017-07-27 RX ADMIN — INSULIN ASPART 2 UNITS: 100 INJECTION, SOLUTION INTRAVENOUS; SUBCUTANEOUS at 04:07

## 2017-07-27 NOTE — PLAN OF CARE
Problem: Physical Therapy Goal  Goal: Physical Therapy Goal  Goals to be met by: 2017     Patient will increase functional independence with mobility by performin. Supine to sit with Stand-by Assistance  2. Sit to supine with Stand-by Assistance  3. Rolling to Left and Right with Stand-by Assistance.  4. Sit to stand transfer with Minimal Assistance using RW  5. Bed to chair transfer with Minimal Assistance using Rolling Walker  6. Gait  x 50 feet with Moderate Assistance using Rolling Walker and prosthetic leg (if available).   7. Ascend/descend 1 flight of stairs with bilateral Handrails and mod assistance to enter home environment.   8. Sitting at edge of bed x15 minutes with Stand-by Assistance and no LOB while performing UE tasks.            Goals remain appropriate.     Ann Marie Klein, PTA.  2017

## 2017-07-27 NOTE — ASSESSMENT & PLAN NOTE
Mr. Retana is a 52 y.o. male with PMH significant for multiple subcortical ICH, CHF, ESRD on HD, HCV, HTN, HLD, DM2 presents to hospital with dizziness, RSW, and N/V and found to have a L thalamic ICH on 7/24.  ICH thought to be secondary to HTN.    HOLD antiplatelets in setting of ICH  Atorvastatin 80 Qdaily  .8; TSH 1.273; A1c 5  SBP goal <140  Heparin DVT PPX  PT/OT/ST -> rehab; dental soft/thin    Anticipate discharge to rehab.  F/u with PCP in 1 week, vascular neurology in 4-6 weeks.

## 2017-07-27 NOTE — PLAN OF CARE
Call received from sister Alicia, states she would like [facility] as it is closer to family and easier to access.  [facility] will be first option, 2nd is [facility] in [place].  Referral sent via [facility] system, will await f/ul from facility.      Miri Boles, RN  Case Management  [phone]

## 2017-07-27 NOTE — NURSING
At 0145 Pt was found on floor, uninjured. VS taken and stable, documented in flow sheets. Pt reports he did not hit head, and that he was attempting to go to bathroom. Sheets of bed were soiled. Fall reported to Bhavya of stroke team, charge nurse and house supervisor. SOS filed. Pt cleaned, moved to room equipped with camera, bed alarm activated, pt re-instructed to stay in bed and call for assistance, call light in reach, side rails raised x3. Attempted to call sister at [phone]. Left message to call charge nurse on unit.

## 2017-07-27 NOTE — NURSING
POC reviewed w/ pt and family. Verbalized understanding. Vs wnl, pt afebrile. No s/sx of distress noted. Sitter at bs due to confusion. Pt oriented to self. Tolerating diet well, pt taken off of thickened liquids per speech therapy request. Pt put on contact until able to receive cdiff result, pt had no bm on day shift until this point. Pt had enema last night w/ multiple loose bms after. Pt cont to have small amount of mucous/bloody rectal discharge, md aware. Will continue to monitor.

## 2017-07-27 NOTE — PROGRESS NOTES
[facility]  Nephrology  Progress Note    Patient Name: Vaughn Retana  MRN: [id number]  Admission Date: 7/24/2017  Hospital Length of Stay: 2 days  Attending Provider: Jeff Spencer MD   Primary Care Physician: Primary Doctor No  Principal Problem:Nontraumatic intracerebral hemorrhage, unspecified    Subjective:     HPI: A 52 year old  male, he dialyzes MWF and [facility] under the care of Dr. Hubbard.  His EDW is 73 kg.  Treatment duration of 3:45 minutes.  He has AVF to [facility]. Patient presented at [facility] after passing out in his home, which was witnessed by his sister, whom refer that was down, right side weakness and slightly confused. Upon arrival to ED presented with hypertension, head CT without contrast showed ICH, as chest x ray dose note no pleural fluid of any substantial volume is seen on either side or cardiac decompensation.     Interval History:   In 750, output 300cc of vomiting yesterday, no nauesa or vomiting today, NET positive 450cc. Na 138, K 3.8    Review of patient's allergies indicates:   Allergen Reactions    Fosrenol [lanthanum] Nausea And Vomiting     Nausea and vomiting     Current Facility-Administered Medications   Medication Frequency    0.9%  NaCl infusion PRN    0.9%  NaCl infusion Once    0.9%  NaCl infusion PRN    0.9%  NaCl infusion Once    acetaminophen tablet 650 mg Q6H PRN    amlodipine tablet 10 mg QAM    atorvastatin tablet 40 mg Daily    bisacodyl suppository 10 mg Daily PRN    carvedilol tablet 25 mg BID WM    cinacalcet tablet 60 mg Daily with breakfast    dextrose 50% injection 12.5 g PRN    dextrose 50% injection 25 g PRN    glucagon (human recombinant) injection 1 mg PRN    glucose chewable tablet 16 g PRN    glucose chewable tablet 24 g PRN    heparin (porcine) injection 5,000 Units Q8H    hydrALAZINE injection 10 mg Q4H PRN    insulin aspart pen 1-10 Units QID (AC + HS) PRN    labetalol injection  10 mg Q4H PRN    lisinopril tablet 40 mg QHS    ondansetron injection 8 mg Q8H PRN    pantoprazole EC tablet 40 mg BID    polyethylene glycol packet 17 g Daily    potassium chloride 10 mEq in 100 mL IVPB Once    senna-docusate 8.6-50 mg per tablet 1 tablet BID    sodium chloride 0.9% flush 3 mL Q8H       Objective:     Vital Signs (Most Recent):  Temp: 98.4 °F (36.9 °C) (07/26/17 1808)  Pulse: 104 (07/26/17 1808)  Resp: 18 (07/26/17 1808)  BP: 138/81 (07/26/17 1808)  SpO2: 99 % (07/26/17 1808)  O2 Device (Oxygen Therapy): room air (07/26/17 1808) Vital Signs (24h Range):  Temp:  [98 °F (36.7 °C)-98.6 °F (37 °C)] 98.4 °F (36.9 °C)  Pulse:  [] 104  Resp:  [11-50] 18  SpO2:  [95 %-100 %] 99 %  BP: (104-157)/(54-91) 138/81     Weight: 69.6 kg (153 lb 7 oz) (07/26/17 0400)  Body mass index is 24.77 kg/m².  Body surface area is 1.8 meters squared.    I/O last 3 completed shifts:  In: 1950 [P.O.:1300; I.V.:50; Other:600]  Out: 1900 [Emesis/NG output:300; Other:1600]    Physical Exam   Constitutional: He appears well-developed and well-nourished. He is active. He has a sickly appearance. Nasal cannula in place.   HENT:   Head: Normocephalic.   Right Ear: External ear normal.   Left Ear: External ear normal.   Nose: Nose normal.   Mouth/Throat: Mucous membranes are dry.   Eyes: Conjunctivae and EOM are normal. Pupils are equal, round, and reactive to light.   Neck: No hepatojugular reflux and no JVD present. Carotid bruit is not present.   Cardiovascular: Regular rhythm and normal heart sounds.  Tachycardia present.    No murmur heard.  Pulmonary/Chest: No stridor. He has rales in the right lower field and the left lower field.   Abdominal: Soft. Bowel sounds are normal. He exhibits no shifting dullness, no distension and no mass. There is no tenderness. No hernia.   Musculoskeletal:   Left leg bellow Knee amputation, Right leg no edma   Neurological: He is alert.   Psychiatric: His speech is delayed.        Significant Labs:    CBC:   Recent Labs  Lab 07/26/17  0450   WBC 9.37   RBC 3.12*   HGB 10.0*   HCT 29.2*      MCV 94   MCH 32.1*   MCHC 34.2     CMP:   Recent Labs  Lab 07/26/17  0450   GLU 84   CALCIUM 9.3   ALBUMIN 3.2*   PROT 7.9      K 3.8   CO2 26   CL 94*   BUN 43*   CREATININE 11.7*   ALKPHOS 183*   ALT 10   AST 18   BILITOT 0.5     All labs within the past 24 hours have been reviewed.         Assessment/Plan:     ESRD on hemodialysis    A 52 year old  male, he dialyzes MWF and [facility] under the care of Dr. Hubbard.  His EDW is 73 kg.  Treatment duration of 3:45 minutes.  He has AVF to [facility]. Arrived due to ICH on CT without contrast, patient doesn't present volume overload on chest x ray.     - Blood pressure control of ICH to have systolic <140.  - Neuro critical care are agree that patient can be started on HD for removal of toxins, would leave net even in ultrafiltration and see how patient tolerates treatment. Hemodynamically stable, with metabolic alkalosis with respiratory acidosis compensation, will be started on a low biacarb bath, goal is to maintain sodium between 140-145.   Current sodium 138 , increase bath to 145 during HD to reach goal.   - Monitor closely lab results for any change in treatment.   - Patient had HD today was well tolerated with UF of 1 L. Reassess tomorrow for any further treatment             Nic Kapoor MD  Nephrology  [facility]

## 2017-07-27 NOTE — ASSESSMENT & PLAN NOTE
A 52 year old  male, he dialyzes MWF and [facility] under the care of Dr. Hubbard.  His EDW is 73 kg.  Treatment duration of 3:45 minutes.  He has AVF to [facility]. Arrived due to ICH on CT without contrast, patient doesn't present volume overload on chest x ray.     - Blood pressure control of ICH to have systolic <140.  - Neuro critical care are agree that patient can be started on HD for removal of toxins, would leave net even in ultrafiltration and see how patient tolerates treatment. Hemodynamically stable, with metabolic alkalosis with respiratory acidosis compensation, will be started on a low biacarb bath, goal is to maintain sodium between 140-145.   Current sodium 138 , increase bath to 145 during HD to reach goal.   - Monitor closely lab results for any change in treatment.   - Patient had HD today was well tolerated with UF of 1 L. Reassess tomorrow for any further treatment

## 2017-07-27 NOTE — PLAN OF CARE
CM met with pt to discuss rehab options, states he would like CM to speak with either his sister or niece regarding options.  Call placed to sister Alicia at [phone], message left to return call.  CM stressed importance of making a choice ASAP to ensure timely transfer when ready.  Also noted [facility] is following per protocol until choice has been given.      Miri Boles, RN  Case Management  [id number]

## 2017-07-27 NOTE — PROGRESS NOTES
[facility]  Vascular Neurology  Comprehensive Stroke Center  Progress Note    Assessment/Plan:     7/25/17-neurologically stable, has some mild aphasia and inattention, N/V remains  07/26/17-neurologically stable, off cardene, plans for step down, patient had enema with large bowel movement this morning.  7/27 - fever, leukocytosis.  Fall without head trauma.  Infectious workup initiated.    * Nontraumatic intracerebral hemorrhage, unspecified    Mr. Retana is a 52 y.o. male with PMH significant for multiple subcortical ICH, CHF, ESRD on HD, HCV, HTN, HLD, DM2 presents to hospital with dizziness, RSW, and N/V and found to have a L thalamic ICH on 7/24.  ICH thought to be secondary to HTN.    HOLD antiplatelets in setting of ICH  Atorvastatin 80 Qdaily  .8; TSH 1.273; A1c 5  SBP goal <140  Heparin DVT PPX  PT/OT/ST -> rehab; dental soft/thin    Anticipate discharge to rehab.  F/u with PCP in 1 week, vascular neurology in 4-6 weeks.        Leukocytosis    -Fever and leukocytosis developed 7/27  -CXR negative for opacifications  -Amylase, lipase wnl  -Procalcitonin elevated to 1.32, suggestive of bacterial infection in setting of ESRD    -UA (pt reports making small amount of urine? Although ESRD)  -Blood cultures x2, C diff  -US of all 4 extremities to look for DVT  -If fever reoccurs or pt develops symptoms, low threshold to initiate broad-spectrum antibiotics.        ESRD on hemodialysis    On [facility]  -Nephrology consulted, appreciate assistance        Vasogenic cerebral edema    2/2 ICH  -Evident on imaging        Singultus    -Gabapentin 100 Qdaily        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    Stroke risk factor  -Goal SBP <140    -Lisinopril 40 Qdaily  -Carvedilol 25 mg BID  -Amlodipine 10 mg QDaily        Constipation    Diarrhea following enema 7/26  -In setting of infectious workup, will send for C diff if diarrhea continues.        Nausea & vomiting    Improving, s/p  reglan and bowel movements  -Continue zofran   -7/26 QTc 491  -On bland diet           Dyslipidemia    Stroke risk factor  -.8, while on atorvastatin 40 at home  -START atorvastatin 80            Type 2 diabetes mellitus with chronic kidney disease on chronic dialysis    Stroke risk factor  -A1C 5.0  -SSI            Neurologic Chief Complaint: L Thalamic ICH    Subjective:     Interval History: Patient is seen for follow-up neurological assessment and treatment recommendations:     Overnight, fever to 101.7.  Fall without head trauma while attempting to ambulate by himself to bathroom.  Diarrhea, although noted to have recent enema/brown bomb.  Infectious workup initiated.  Denies n/v after eating breakfast, but endorses singultus.    HPI, Past Medical, Family, and Social History remains the same as documented in the initial encounter.     Review of Systems   Reason unable to perform ROS: limited 2/2 AMS.   Constitutional: Positive for fever. Negative for chills.   Respiratory: Negative for shortness of breath.    Cardiovascular: Negative for chest pain.   Gastrointestinal: Positive for diarrhea. Negative for abdominal pain, nausea and vomiting.     Scheduled Meds:   sodium chloride 0.9%   Intravenous Once    sodium chloride 0.9%   Intravenous Once    amlodipine  10 mg Oral QAM    atorvastatin  40 mg Oral Daily    carvedilol  25 mg Oral BID WM    cinacalcet  60 mg Oral Daily with breakfast    gabapentin  100 mg Oral Daily    heparin (porcine)  5,000 Units Subcutaneous Q8H    lisinopril  40 mg Oral QHS    pantoprazole  40 mg Oral BID    polyethylene glycol  17 g Oral Daily    potassium chloride  10 mEq Intravenous Once    senna-docusate 8.6-50 mg  1 tablet Oral BID    sodium chloride 0.9%  3 mL Intravenous Q8H     Continuous Infusions:   PRN Meds:sodium chloride 0.9%, sodium chloride 0.9%, acetaminophen, bisacodyl, dextrose 50%, dextrose 50%, glucagon (human recombinant), glucose, glucose,  hydrALAZINE, insulin aspart, labetalol, ondansetron    Objective:     Vital Signs (Most Recent):  Temp: 97.1 °F (36.2 °C) (17 1140)  Pulse: 73 (17 1140)  Resp: 17 (17 1140)  BP: 107/65 (17 1140)  SpO2: 97 % (17 1140)  BP Location: Left arm    Vital Signs Range (Last 24H):  Temp:  [97.1 °F (36.2 °C)-101.7 °F (38.7 °C)]   Pulse:  []   Resp:  [14-37]   BP: (104-138)/(54-89)   SpO2:  [96 %-100 %]   BP Location: Left arm    Physical Exam   HENT:   Head: Normocephalic and atraumatic.   Cardiovascular: Normal rate and regular rhythm.    Murmur (2/6 systolic murmur emanating from R fistula) heard.  Pulmonary/Chest: Effort normal and breath sounds normal. No respiratory distress. He has no wheezes.   Abdominal: Soft. Bowel sounds are normal. He exhibits no distension and no mass. There is no tenderness. There is no rebound and no guarding.   Musculoskeletal: He exhibits no edema.   L BKA       Neurological Exam:   LOC: alert and follows requests  Language: Expressive aphasia  Speech: Dysarthria  Orientation: Person, Place, Not oriented to time  Visual Fields (CN II): Full  EOM (CN III, IV, VI): Full/intact  Facial Sensation (CN V): Symmetric  Facial Movement (CN VII): symmetric facial expression    NIH Stroke Scale:    Level of Consciousness: 0 - alert  LOC Questions: 2 - answers none correctly  LOC Commands: 0 - performs both correctly  Best Gaze: 0 - normal  Visual: 0 - no visual loss  Facial Palsy: 0 - normal  Motor Left Arm: 0 - no drift  Motor Right Arm: 1 - drift  Motor Left Le - no drift  Motor Right Le - drift  Limb Ataxia: 0 - absent  Sensory: 0 - normal  Best Language: 1 - mild to moderate aphasia  Dysarthria: 1 - mild to moderate dysarthria  Extinction and Inattention: 0 - no neglect  NIH Stroke Scale Total: 6      Laboratory:  Recent Results (from the past 24 hour(s))   POCT glucose    Collection Time: 17  5:23 PM   Result Value Ref Range    POCT Glucose 138 (H)  70 - 110 mg/dL   POCT glucose    Collection Time: 07/26/17 11:37 PM   Result Value Ref Range    POCT Glucose 158 (H) 70 - 110 mg/dL   Blood culture    Collection Time: 07/27/17 12:49 AM   Result Value Ref Range    Blood Culture, Routine No Growth to date    Protime-INR    Collection Time: 07/27/17  4:12 AM   Result Value Ref Range    Prothrombin Time 11.1 9.0 - 12.5 sec    INR 1.0 0.8 - 1.2   APTT    Collection Time: 07/27/17  4:12 AM   Result Value Ref Range    aPTT 25.6 21.0 - 32.0 sec   Comprehensive metabolic panel    Collection Time: 07/27/17  4:12 AM   Result Value Ref Range    Sodium 137 136 - 145 mmol/L    Potassium 3.8 3.5 - 5.1 mmol/L    Chloride 100 95 - 110 mmol/L    CO2 23 23 - 29 mmol/L    Glucose 108 70 - 110 mg/dL    BUN, Bld 32 (H) 6 - 20 mg/dL    Creatinine 9.1 (H) 0.5 - 1.4 mg/dL    Calcium 8.5 (L) 8.7 - 10.5 mg/dL    Total Protein 7.6 6.0 - 8.4 g/dL    Albumin 3.1 (L) 3.5 - 5.2 g/dL    Total Bilirubin 0.5 0.1 - 1.0 mg/dL    Alkaline Phosphatase 179 (H) 55 - 135 U/L    AST 20 10 - 40 U/L    ALT 10 10 - 44 U/L    Anion Gap 14 8 - 16 mmol/L    eGFR if African American 6.9 (A) >60 mL/min/1.73 m^2    eGFR if non African American 5.9 (A) >60 mL/min/1.73 m^2   CBC auto differential    Collection Time: 07/27/17  4:12 AM   Result Value Ref Range    WBC 13.26 (H) 3.90 - 12.70 K/uL    RBC 3.03 (L) 4.60 - 6.20 M/uL    Hemoglobin 9.7 (L) 14.0 - 18.0 g/dL    Hematocrit 28.3 (L) 40.0 - 54.0 %    MCV 93 82 - 98 fL    MCH 32.0 (H) 27.0 - 31.0 pg    MCHC 34.3 32.0 - 36.0 g/dL    RDW 13.8 11.5 - 14.5 %    Platelets 185 150 - 350 K/uL    MPV 10.2 9.2 - 12.9 fL    Gran # 9.8 (H) 1.8 - 7.7 K/uL    Lymph # 2.0 1.0 - 4.8 K/uL    Mono # 1.3 (H) 0.3 - 1.0 K/uL    Eos # 0.1 0.0 - 0.5 K/uL    Baso # 0.01 0.00 - 0.20 K/uL    Gran% 74.1 (H) 38.0 - 73.0 %    Lymph% 15.2 (L) 18.0 - 48.0 %    Mono% 9.6 4.0 - 15.0 %    Eosinophil% 0.6 0.0 - 8.0 %    Basophil% 0.1 0.0 - 1.9 %    Differential Method Automated    Magnesium     Collection Time: 07/27/17  4:12 AM   Result Value Ref Range    Magnesium 2.3 1.6 - 2.6 mg/dL   Phosphorus    Collection Time: 07/27/17  4:12 AM   Result Value Ref Range    Phosphorus 2.7 2.7 - 4.5 mg/dL   POCT glucose    Collection Time: 07/27/17  8:40 AM   Result Value Ref Range    POCT Glucose 118 (H) 70 - 110 mg/dL   POCT glucose    Collection Time: 07/27/17 11:40 AM   Result Value Ref Range    POCT Glucose 184 (H) 70 - 110 mg/dL   Amylase    Collection Time: 07/27/17 12:26 PM   Result Value Ref Range    Amylase 101 20 - 110 U/L   Lipase    Collection Time: 07/27/17 12:26 PM   Result Value Ref Range    Lipase 23 4 - 60 U/L   Procalcitonin    Collection Time: 07/27/17  1:22 PM   Result Value Ref Range    Procalcitonin 1.32 (H) <0.25 ng/mL         Diagnostic Results:  7/27/17 CXR: Per resident interpretation,  No opacifications.  Increased pulmonary vasculature markings.      Gilles Sparks MD  [facility] St[facility] Center  Department of Vascular Neurology   [facility]

## 2017-07-27 NOTE — SUBJECTIVE & OBJECTIVE
Neurologic Chief Complaint: L Thalamic ICH    Subjective:     Interval History: Patient is seen for follow-up neurological assessment and treatment recommendations:     Overnight, fever to 101.7.  Fall without head trauma while attempting to ambulate by himself to bathroom.  Diarrhea, although noted to have recent enema/brown bomb.  Infectious workup initiated.  Denies n/v after eating breakfast, but endorses singultus.    HPI, Past Medical, Family, and Social History remains the same as documented in the initial encounter.     Review of Systems   Reason unable to perform ROS: limited 2/2 AMS.   Constitutional: Positive for fever. Negative for chills.   Respiratory: Negative for shortness of breath.    Cardiovascular: Negative for chest pain.   Gastrointestinal: Positive for diarrhea. Negative for abdominal pain, nausea and vomiting.     Scheduled Meds:   sodium chloride 0.9%   Intravenous Once    sodium chloride 0.9%   Intravenous Once    amlodipine  10 mg Oral QAM    atorvastatin  40 mg Oral Daily    carvedilol  25 mg Oral BID WM    cinacalcet  60 mg Oral Daily with breakfast    gabapentin  100 mg Oral Daily    heparin (porcine)  5,000 Units Subcutaneous Q8H    lisinopril  40 mg Oral QHS    pantoprazole  40 mg Oral BID    polyethylene glycol  17 g Oral Daily    potassium chloride  10 mEq Intravenous Once    senna-docusate 8.6-50 mg  1 tablet Oral BID    sodium chloride 0.9%  3 mL Intravenous Q8H     Continuous Infusions:   PRN Meds:sodium chloride 0.9%, sodium chloride 0.9%, acetaminophen, bisacodyl, dextrose 50%, dextrose 50%, glucagon (human recombinant), glucose, glucose, hydrALAZINE, insulin aspart, labetalol, ondansetron    Objective:     Vital Signs (Most Recent):  Temp: 97.1 °F (36.2 °C) (07/27/17 1140)  Pulse: 73 (07/27/17 1140)  Resp: 17 (07/27/17 1140)  BP: 107/65 (07/27/17 1140)  SpO2: 97 % (07/27/17 1140)  BP Location: Left arm    Vital Signs Range (Last 24H):  Temp:  [97.1 °F (36.2  °C)-101.7 °F (38.7 °C)]   Pulse:  []   Resp:  [14-37]   BP: (104-138)/(54-89)   SpO2:  [96 %-100 %]   BP Location: Left arm    Physical Exam   HENT:   Head: Normocephalic and atraumatic.   Cardiovascular: Normal rate and regular rhythm.    Murmur (2/6 systolic murmur emanating from R fistula) heard.  Pulmonary/Chest: Effort normal and breath sounds normal. No respiratory distress. He has no wheezes.   Abdominal: Soft. Bowel sounds are normal. He exhibits no distension and no mass. There is no tenderness. There is no rebound and no guarding.   Musculoskeletal: He exhibits no edema.   L BKA       Neurological Exam:   LOC: alert and follows requests  Language: Expressive aphasia  Speech: Dysarthria  Orientation: Person, Place, Not oriented to time  Visual Fields (CN II): Full  EOM (CN III, IV, VI): Full/intact  Facial Sensation (CN V): Symmetric  Facial Movement (CN VII): symmetric facial expression    NIH Stroke Scale:    Level of Consciousness: 0 - alert  LOC Questions: 2 - answers none correctly  LOC Commands: 0 - performs both correctly  Best Gaze: 0 - normal  Visual: 0 - no visual loss  Facial Palsy: 0 - normal  Motor Left Arm: 0 - no drift  Motor Right Arm: 1 - drift  Motor Left Le - no drift  Motor Right Le - drift  Limb Ataxia: 0 - absent  Sensory: 0 - normal  Best Language: 1 - mild to moderate aphasia  Dysarthria: 1 - mild to moderate dysarthria  Extinction and Inattention: 0 - no neglect  NIH Stroke Scale Total: 6      Laboratory:  Recent Results (from the past 24 hour(s))   POCT glucose    Collection Time: 17  5:23 PM   Result Value Ref Range    POCT Glucose 138 (H) 70 - 110 mg/dL   POCT glucose    Collection Time: 17 11:37 PM   Result Value Ref Range    POCT Glucose 158 (H) 70 - 110 mg/dL   Blood culture    Collection Time: 17 12:49 AM   Result Value Ref Range    Blood Culture, Routine No Growth to date    Protime-INR    Collection Time: 17  4:12 AM   Result Value Ref  Range    Prothrombin Time 11.1 9.0 - 12.5 sec    INR 1.0 0.8 - 1.2   APTT    Collection Time: 07/27/17  4:12 AM   Result Value Ref Range    aPTT 25.6 21.0 - 32.0 sec   Comprehensive metabolic panel    Collection Time: 07/27/17  4:12 AM   Result Value Ref Range    Sodium 137 136 - 145 mmol/L    Potassium 3.8 3.5 - 5.1 mmol/L    Chloride 100 95 - 110 mmol/L    CO2 23 23 - 29 mmol/L    Glucose 108 70 - 110 mg/dL    BUN, Bld 32 (H) 6 - 20 mg/dL    Creatinine 9.1 (H) 0.5 - 1.4 mg/dL    Calcium 8.5 (L) 8.7 - 10.5 mg/dL    Total Protein 7.6 6.0 - 8.4 g/dL    Albumin 3.1 (L) 3.5 - 5.2 g/dL    Total Bilirubin 0.5 0.1 - 1.0 mg/dL    Alkaline Phosphatase 179 (H) 55 - 135 U/L    AST 20 10 - 40 U/L    ALT 10 10 - 44 U/L    Anion Gap 14 8 - 16 mmol/L    eGFR if African American 6.9 (A) >60 mL/min/1.73 m^2    eGFR if non African American 5.9 (A) >60 mL/min/1.73 m^2   CBC auto differential    Collection Time: 07/27/17  4:12 AM   Result Value Ref Range    WBC 13.26 (H) 3.90 - 12.70 K/uL    RBC 3.03 (L) 4.60 - 6.20 M/uL    Hemoglobin 9.7 (L) 14.0 - 18.0 g/dL    Hematocrit 28.3 (L) 40.0 - 54.0 %    MCV 93 82 - 98 fL    MCH 32.0 (H) 27.0 - 31.0 pg    MCHC 34.3 32.0 - 36.0 g/dL    RDW 13.8 11.5 - 14.5 %    Platelets 185 150 - 350 K/uL    MPV 10.2 9.2 - 12.9 fL    Gran # 9.8 (H) 1.8 - 7.7 K/uL    Lymph # 2.0 1.0 - 4.8 K/uL    Mono # 1.3 (H) 0.3 - 1.0 K/uL    Eos # 0.1 0.0 - 0.5 K/uL    Baso # 0.01 0.00 - 0.20 K/uL    Gran% 74.1 (H) 38.0 - 73.0 %    Lymph% 15.2 (L) 18.0 - 48.0 %    Mono% 9.6 4.0 - 15.0 %    Eosinophil% 0.6 0.0 - 8.0 %    Basophil% 0.1 0.0 - 1.9 %    Differential Method Automated    Magnesium    Collection Time: 07/27/17  4:12 AM   Result Value Ref Range    Magnesium 2.3 1.6 - 2.6 mg/dL   Phosphorus    Collection Time: 07/27/17  4:12 AM   Result Value Ref Range    Phosphorus 2.7 2.7 - 4.5 mg/dL   POCT glucose    Collection Time: 07/27/17  8:40 AM   Result Value Ref Range    POCT Glucose 118 (H) 70 - 110 mg/dL   POCT  glucose    Collection Time: 07/27/17 11:40 AM   Result Value Ref Range    POCT Glucose 184 (H) 70 - 110 mg/dL   Amylase    Collection Time: 07/27/17 12:26 PM   Result Value Ref Range    Amylase 101 20 - 110 U/L   Lipase    Collection Time: 07/27/17 12:26 PM   Result Value Ref Range    Lipase 23 4 - 60 U/L   Procalcitonin    Collection Time: 07/27/17  1:22 PM   Result Value Ref Range    Procalcitonin 1.32 (H) <0.25 ng/mL         Diagnostic Results:  7/27/17 CXR: Per resident interpretation,  No opacifications.  Increased pulmonary vasculature markings.

## 2017-07-27 NOTE — PROGRESS NOTES
[facility]  Physical Medicine & Rehab  Progress Note    Patient Name: Vaughn Retana  MRN: [id number]  Admission Date: 7/24/2017  Length of Stay: 3 days  Attending Physician: Shabbir Mendez MD  Collaborating Physician: Rush Jain MD    Subjective:     Principal Problem:Nontraumatic intracerebral hemorrhage, unspecified    Hospital Course:   7/24/17:  Evaluated by OT.  Bed mobility mod-totalA.  No transfers.  UBD totalA.  7/25/17:  Bed mobility min-maxA.  Sit to stand totalA (maxA x 2) with RW.  No transfers or gait.  EOB x 5 minutes SBA.  Refused ADLs.  7/26/17:  No PT and OT 2/2 HD.  Stepped down to floor.     AM-PAC 6 CLICK MOBILITY (PT) - Total score: 9 (7/25)  AM-PAC 6 CLICK ADL (OT) - Total score: 6 (7/24), 6 (7/25)    AM-PAC Raw Score Level of Impairment Assistance   6 100% Total / Unable   7 - 8 80 - 100% Maximal Assist   9-13 60 - 80% Moderate Assist   14 - 19 40 - 60% Moderate Assist   20 - 22 20 - 40% Minimal Assist   23 1-20% SBA / CGA   24 0% Independent/ Mod I     Interval History (07/27/2017): Patient is seen for follow-up rehab evaluation and recommendations.  No acute events over night.  N/V improving.  Stepped down to floor yesterday.  Better participation/interaction today.     HPI, Past Medical, Surgical, Family, and Social History remains the same as documented in the initial encounter.    Scheduled Medications:    sodium chloride 0.9%   Intravenous Once    sodium chloride 0.9%   Intravenous Once    amlodipine  10 mg Oral QAM    atorvastatin  40 mg Oral Daily    carvedilol  25 mg Oral BID WM    cinacalcet  60 mg Oral Daily with breakfast    heparin (porcine)  5,000 Units Subcutaneous Q8H    lisinopril  40 mg Oral QHS    pantoprazole  40 mg Oral BID    polyethylene glycol  17 g Oral Daily    potassium chloride  10 mEq Intravenous Once    senna-docusate 8.6-50 mg  1 tablet Oral BID    sodium chloride 0.9%  3 mL Intravenous Q8H       PRN Medications: sodium  chloride 0.9%, sodium chloride 0.9%, acetaminophen, bisacodyl, dextrose 50%, dextrose 50%, glucagon (human recombinant), glucose, glucose, hydrALAZINE, insulin aspart, labetalol, ondansetron    Review of Systems   Constitutional: Negative for chills, fatigue and fever.   HENT: Negative for drooling, hearing loss, trouble swallowing and voice change.    Eyes: Negative for pain and visual disturbance.   Respiratory: Negative for cough, shortness of breath and wheezing.    Cardiovascular: Negative for chest pain and palpitations.   Gastrointestinal: Negative for abdominal pain, nausea and vomiting.   Genitourinary: Negative for difficulty urinating and flank pain.   Musculoskeletal: Negative for arthralgias, back pain, myalgias and neck pain.   Skin: Negative for rash and wound.   Neurological: Positive for weakness. Negative for dizziness, numbness and headaches.   Psychiatric/Behavioral: Positive for confusion. Negative for agitation and hallucinations. The patient is not nervous/anxious.      Objective:     Vital Signs (Most Recent):  Temp: 98.8 °F (37.1 °C) (07/27/17 0825)  Pulse: 85 (07/27/17 0825)  Resp: 17 (07/27/17 0825)  BP: (!) 111/59 (07/27/17 0825)  SpO2: 96 % (07/27/17 0825)    Vital Signs (24h Range):  Temp:  [98 °F (36.7 °C)-101.7 °F (38.7 °C)] 98.8 °F (37.1 °C)  Pulse:  [] 85  Resp:  [11-40] 17  SpO2:  [96 %-100 %] 96 %  BP: (104-138)/(54-89) 111/59     Physical Exam   Constitutional: He appears well-developed and well-nourished. No distress.   Sitter at bedside.    HENT:   Head: Normocephalic and atraumatic.   Right Ear: External ear normal.   Left Ear: External ear normal.   Nose: Nose normal.   Eyes: Right eye exhibits no discharge. Left eye exhibits no discharge. No scleral icterus.   Neck: Normal range of motion.   Cardiovascular: Normal rate, regular rhythm and intact distal pulses.    Pulmonary/Chest: Effort normal. No respiratory distress. He has no wheezes.   Abdominal: Soft. He exhibits  no distension. There is no tenderness.   Musculoskeletal: Normal range of motion. He exhibits no edema or tenderness.   L BKA   Neurological:   -  Mental Status:  Awake and alert.  Oriented to person and place.  Unsure of year or situation.  Confusion.  Follows commands.  Answers correct age and .  -  Speech and language:  no aphasia, mild dysarthria.  -  Vision:  no hemianopsia or ptosis.  -  Facial movement (CN VII): symmetrical.  -  Motor:  RUE: 4/5.  LUE: 5/5.  RLE: proximal 2/5, distal 4-/5.  LLE: 5/5.   Skin: Skin is warm and dry. No rash noted.   Psychiatric: He has a normal mood and affect. His behavior is normal. Thought content normal. Cognition and memory are impaired.   Vitals reviewed.    Diagnostic Results:   Labs: Reviewed  X-Ray: Reviewed  CT: Reviewed    Assessment/Plan:      Intraparenchymal hemorrhage of brain    -  See ICH        Vasogenic cerebral edema    -  See ICH        Constipation    -  Bowel regimen started by [facility]  -  X-ray negative for ileus  -  BM after enema yesterday         ESRD on hemodialysis    -  HD MWF  -  Nephrology following         * Nontraumatic intracerebral hemorrhage, unspecified    -  History of multiple ICH/IPH  -  CTH showing L thalamic IPH  -  Hypertensive on arrival, off Cardene gtt  -  Repeat CTH stable  -  Evaluated by Neurosurgery and no acute intervention necessary     Functional status: see hospital course  Cognitive/Speech/Language status: Cognitive-Linguistic Impairment.   Nutrition/Swallow Status:  Passed bedside swallow evaluation.  SLP recommended dental soft diet and thin liquids.    Recommendations  -  Encourage mobility, OOB in chair at least 3 hours per day, and early ambulation as appropriate   -  PT/OT evaluate and treat  -  SLP speech and cognitive evaluate and treat  -  Monitor sleep disturbances and establish consistent sleep-wake cycle  -  Monitor for bowel and bladder dysfunction  -  Monitor for shoulder pain and subluxation  -  Monitor for  spasticity  -  Monitor for and prevent skin breakdown and pressure ulcers  · Early mobility, repositioning/weight shifting every 20-30 minutes when sitting, turn patient every 2 hours, proper mattress/overlay and chair cushioning, pressure relief/heel protector boots  -  DVT prophylaxis:  MEL, SCD  -  Reviewed discharge options (IP rehab, SNF, HH therapy, and OP therapy)        With rehab goals.  No PT and OT yesterday 2/2 HD.  Will follow and discuss with rehab team for final rehab recommendation.    Thank you for your consult.    SEAN Gonzalez  Department of Physical Medicine & Rehab   [facility]

## 2017-07-27 NOTE — ASSESSMENT & PLAN NOTE
Stroke risk factor  -Goal SBP <140    -Lisinopril 40 Qdaily  -Carvedilol 25 mg BID  -Amlodipine 10 mg QDaily

## 2017-07-27 NOTE — PT/OT/SLP PROGRESS
Physical Therapy  Treatment    Vaughn Retana   MRN: [id number]   Admitting Diagnosis: Nontraumatic intracerebral hemorrhage, unspecified    PT Received On: 07/27/17  PT Start Time: 1051     PT Stop Time: 1116    PT Total Time (min): 25 min       Billable Minutes:  Therapeutic Activity 9 and Neuromuscular Re-education 16    Treatment Type: Treatment  PT/PTA: PTA     PTA Visit Number: 1       General Precautions: Standard, aspiration, fall  Orthopedic Precautions: N/A   Braces: N/A         Subjective:  Communicated with RN (Lainey) prior to session.  Pt agreeable to PT session    Pain/Comfort  Pain Rating 1: 0/10  Pain Rating Post-Intervention 1: 0/10    Objective:   Patient found with: bed alarm, telemetry (sitter)        FUNCTIONAL MOBILITY    Bed Mobility (with vc's for sequencing and safe technique of functional mobility):         Rolling to the L with min A       Sup > sit at the EOB with mod A from L side lying        Sit > sup with mod A       Scooting hips to the EOB upon sitting with mod A x2 scoots       Scooting hips along the EOB to the L requiring max A x3 scoots    Transfers  (2 trials with RW, 2 trials with NO AD)       Sit > stand from EOB with RW/no AD requiring max A for hip elevation and R LE                Pt unable to achieve full erect posture       Stand > sit to EOB requiring mod A for controlled descent              THERAPEUTIC ACTIVITIES     SITTING (18 min)       Pt tolerates sitting at the EOB with CGA for trunk control and B UE support while in weight bearing       Position on mattress with vc's for postural control, weight shift, to keep eyes open and to attend to        Task at hand.  Pt demonstrates flexed thoracic spine, PPT, downward gaze and R lateral lean    STANDING (10 sec at time x3 trials)       Pt tolerates standing with RW/no AD requiring mod to max A for hip ext, R Knee ext and mgt of AD       with vc's for upright posture.  Pt demonstrates FFP with R lateral  lean    Education:  Education provided to pt regarding: postural control, weight shift. Verbalized understanding.     Whiteboard updated with correct mobility information. RN/PCT notified.  Pt safe to sit at the EOB with therapy ONLY at this time.         AM-PAC 6 CLICK MOBILITY  How much help from another person does this patient currently need?   1 = Unable, Total/Dependent Assistance  2 = A lot, Maximum/Moderate Assistance  3 = A little, Minimum/Contact Guard/Supervision  4 = None, Modified [place]/Independent    Turning over in bed (including adjusting bedclothes, sheets and blankets)?: 3  Sitting down on and standing up from a chair with arms (e.g., wheelchair, bedside commode, etc.): 1  Moving from lying on back to sitting on the side of the bed?: 2  Moving to and from a bed to a chair (including a wheelchair)?: 1  Need to walk in hospital room?: 1  Climbing 3-5 steps with a railing?: 1  Total Score: 9    AM-PAC Raw Score CMS G-Code Modifier Level of Impairment Assistance   6 % Total / Unable   7 - 9 CM 80 - 100% Maximal Assist   10 - 14 CL 60 - 80% Moderate Assist   15 - 19 CK 40 - 60% Moderate Assist   20 - 22 CJ 20 - 40% Minimal Assist   23 CI 1-20% SBA / CGA   24 CH 0% Independent/ Mod I     Patient left supine with all lines intact, call button in reach, bed alarm on, RN notified and sitter present.    Assessment:  Vaughn Retana is a 52 y.o. male with a medical diagnosis of Nontraumatic intracerebral hemorrhage, unspecified and presents with R HP, impaired balance and lethargy requiring significant assistance for bed mob and transfers.  Pt will cont to benefit from skilled PT intervention to address deficits and improve functional mobility.     Rehab identified problem list/impairments: Rehab identified problem list/impairments: weakness, impaired endurance, impaired sensation, impaired self care skills, impaired functional mobilty, impaired balance, impaired cognition, decreased  coordination, decreased upper extremity function, decreased lower extremity function, decreased safety awareness, impaired coordination, impaired fine motor    Rehab potential is good.    Activity tolerance: Fair    Discharge recommendations: Discharge Facility/Level Of Care Needs: rehabilitation facility     Barriers to discharge: Barriers to Discharge: Inaccessible home environment ([facility])    Equipment recommendations: Equipment Needed After Discharge:  (TBD at next level of care)     GOALS:    Physical Therapy Goals        Problem: Physical Therapy Goal    Goal Priority Disciplines Outcome Goal Variances Interventions   Physical Therapy Goal     PT/OT, PT Ongoing (interventions implemented as appropriate)     Description:  Goals to be met by: 2017     Patient will increase functional independence with mobility by performin. Supine to sit with Stand-by Assistance  2. Sit to supine with Stand-by Assistance  3. Rolling to Left and Right with Stand-by Assistance.  4. Sit to stand transfer with Minimal Assistance using RW  5. Bed to chair transfer with Minimal Assistance using Rolling Walker  6. Gait  x 50 feet with Moderate Assistance using Rolling Walker and prosthetic leg (if available).   7. Ascend/descend 1 flight of stairs with bilateral Handrails and mod assistance to enter home environment.   8. Sitting at edge of bed x15 minutes with Stand-by Assistance and no LOB while performing UE tasks.                       PLAN:    Patient to be seen 6 x/week  to address the above listed problems via gait training, therapeutic activities, therapeutic exercises, neuromuscular re-education  Plan of Care expires: 17  Plan of Care reviewed with: patient         Ann Marie BISHOP Ernie, PTA  2017

## 2017-07-27 NOTE — PLAN OF CARE
Problem: Patient Care Overview  Goal: Plan of Care Review  Outcome: Ongoing (interventions implemented as appropriate)  POC reviewed w p, who verbalized limited understanding. Pt VS remain stable and WDL, with exception of temperature of 101.7 at 2000 and 100.8 at 0000, reported to stroke team. Pt has delayed responses and answers only some questions, AAOx self, other orientation questions unanswered. Pt has hiccups, using suction and spitting into cup. Pt c/o nausea, given prn medication. Pt is incontinent of bowel. Has large BM. Cleaned and changed. Bed in lowest position, wheels locked, side rails raised x2, and call light w/in reach. Will continue to monitor.

## 2017-07-27 NOTE — SUBJECTIVE & OBJECTIVE
Interval History:   In 750, output 300cc of vomiting yesterday, no nauesa or vomiting today, NET positive 450cc. Na 138, K 3.8    Review of patient's allergies indicates:   Allergen Reactions    Fosrenol [lanthanum] Nausea And Vomiting     Nausea and vomiting     Current Facility-Administered Medications   Medication Frequency    0.9%  NaCl infusion PRN    0.9%  NaCl infusion Once    0.9%  NaCl infusion PRN    0.9%  NaCl infusion Once    acetaminophen tablet 650 mg Q6H PRN    amlodipine tablet 10 mg QAM    atorvastatin tablet 40 mg Daily    bisacodyl suppository 10 mg Daily PRN    carvedilol tablet 25 mg BID WM    cinacalcet tablet 60 mg Daily with breakfast    dextrose 50% injection 12.5 g PRN    dextrose 50% injection 25 g PRN    glucagon (human recombinant) injection 1 mg PRN    glucose chewable tablet 16 g PRN    glucose chewable tablet 24 g PRN    heparin (porcine) injection 5,000 Units Q8H    hydrALAZINE injection 10 mg Q4H PRN    insulin aspart pen 1-10 Units QID (AC + HS) PRN    labetalol injection 10 mg Q4H PRN    lisinopril tablet 40 mg QHS    ondansetron injection 8 mg Q8H PRN    pantoprazole EC tablet 40 mg BID    polyethylene glycol packet 17 g Daily    potassium chloride 10 mEq in 100 mL IVPB Once    senna-docusate 8.6-50 mg per tablet 1 tablet BID    sodium chloride 0.9% flush 3 mL Q8H       Objective:     Vital Signs (Most Recent):  Temp: 98.4 °F (36.9 °C) (07/26/17 1808)  Pulse: 104 (07/26/17 1808)  Resp: 18 (07/26/17 1808)  BP: 138/81 (07/26/17 1808)  SpO2: 99 % (07/26/17 1808)  O2 Device (Oxygen Therapy): room air (07/26/17 1808) Vital Signs (24h Range):  Temp:  [98 °F (36.7 °C)-98.6 °F (37 °C)] 98.4 °F (36.9 °C)  Pulse:  [] 104  Resp:  [11-50] 18  SpO2:  [95 %-100 %] 99 %  BP: (104-157)/(54-91) 138/81     Weight: 69.6 kg (153 lb 7 oz) (07/26/17 0400)  Body mass index is 24.77 kg/m².  Body surface area is 1.8 meters squared.    I/O last 3 completed shifts:  In:  1950 [P.O.:1300; I.V.:50; Other:600]  Out: 1900 [Emesis/NG output:300; Other:1600]    Physical Exam   Constitutional: He appears well-developed and well-nourished. He is active. He has a sickly appearance. Nasal cannula in place.   HENT:   Head: Normocephalic.   Right Ear: External ear normal.   Left Ear: External ear normal.   Nose: Nose normal.   Mouth/Throat: Mucous membranes are dry.   Eyes: Conjunctivae and EOM are normal. Pupils are equal, round, and reactive to light.   Neck: No hepatojugular reflux and no JVD present. Carotid bruit is not present.   Cardiovascular: Regular rhythm and normal heart sounds.  Tachycardia present.    No murmur heard.  Pulmonary/Chest: No stridor. He has rales in the right lower field and the left lower field.   Abdominal: Soft. Bowel sounds are normal. He exhibits no shifting dullness, no distension and no mass. There is no tenderness. No hernia.   Musculoskeletal:   Left leg bellow Knee amputation, Right leg no edma   Neurological: He is alert.   Psychiatric: His speech is delayed.       Significant Labs:    CBC:   Recent Labs  Lab 07/26/17  0450   WBC 9.37   RBC 3.12*   HGB 10.0*   HCT 29.2*      MCV 94   MCH 32.1*   MCHC 34.2     CMP:   Recent Labs  Lab 07/26/17  0450   GLU 84   CALCIUM 9.3   ALBUMIN 3.2*   PROT 7.9      K 3.8   CO2 26   CL 94*   BUN 43*   CREATININE 11.7*   ALKPHOS 183*   ALT 10   AST 18   BILITOT 0.5     All labs within the past 24 hours have been reviewed.

## 2017-07-27 NOTE — PLAN OF CARE
Hayder with [facility] has evaluated pt for admission, will have final decision by end of day.

## 2017-07-27 NOTE — SUBJECTIVE & OBJECTIVE
Interval History (07/27/2017): Patient is seen for follow-up rehab evaluation and recommendations.  No acute events over night.  N/V improving.  Stepped down to floor yesterday.  Better participation/interaction today.     HPI, Past Medical, Surgical, Family, and Social History remains the same as documented in the initial encounter.    Scheduled Medications:    sodium chloride 0.9%   Intravenous Once    sodium chloride 0.9%   Intravenous Once    amlodipine  10 mg Oral QAM    atorvastatin  40 mg Oral Daily    carvedilol  25 mg Oral BID WM    cinacalcet  60 mg Oral Daily with breakfast    heparin (porcine)  5,000 Units Subcutaneous Q8H    lisinopril  40 mg Oral QHS    pantoprazole  40 mg Oral BID    polyethylene glycol  17 g Oral Daily    potassium chloride  10 mEq Intravenous Once    senna-docusate 8.6-50 mg  1 tablet Oral BID    sodium chloride 0.9%  3 mL Intravenous Q8H       PRN Medications: sodium chloride 0.9%, sodium chloride 0.9%, acetaminophen, bisacodyl, dextrose 50%, dextrose 50%, glucagon (human recombinant), glucose, glucose, hydrALAZINE, insulin aspart, labetalol, ondansetron    Review of Systems   Constitutional: Negative for chills, fatigue and fever.   HENT: Negative for drooling, hearing loss, trouble swallowing and voice change.    Eyes: Negative for pain and visual disturbance.   Respiratory: Negative for cough, shortness of breath and wheezing.    Cardiovascular: Negative for chest pain and palpitations.   Gastrointestinal: Negative for abdominal pain, nausea and vomiting.   Genitourinary: Negative for difficulty urinating and flank pain.   Musculoskeletal: Negative for arthralgias, back pain, myalgias and neck pain.   Skin: Negative for rash and wound.   Neurological: Positive for weakness. Negative for dizziness, numbness and headaches.   Psychiatric/Behavioral: Positive for confusion. Negative for agitation and hallucinations. The patient is not nervous/anxious.      Objective:      Vital Signs (Most Recent):  Temp: 98.8 °F (37.1 °C) (17)  Pulse: 85 (17)  Resp: 17 (17)  BP: (!) 111/59 (17)  SpO2: 96 % (17)    Vital Signs (24h Range):  Temp:  [98 °F (36.7 °C)-101.7 °F (38.7 °C)] 98.8 °F (37.1 °C)  Pulse:  [] 85  Resp:  [11-40] 17  SpO2:  [96 %-100 %] 96 %  BP: (104-138)/(54-89) 111/59     Physical Exam   Constitutional: He appears well-developed and well-nourished. No distress.   Sitter at bedside.    HENT:   Head: Normocephalic and atraumatic.   Right Ear: External ear normal.   Left Ear: External ear normal.   Nose: Nose normal.   Eyes: Right eye exhibits no discharge. Left eye exhibits no discharge. No scleral icterus.   Neck: Normal range of motion.   Cardiovascular: Normal rate, regular rhythm and intact distal pulses.    Pulmonary/Chest: Effort normal. No respiratory distress. He has no wheezes.   Abdominal: Soft. He exhibits no distension. There is no tenderness.   Musculoskeletal: Normal range of motion. He exhibits no edema or tenderness.   L BKA   Neurological:   -  Mental Status:  Awake and alert.  Oriented to person and place.  Unsure of year or situation.  Confusion.  Follows commands.  Answers correct age and .  -  Speech and language:  no aphasia, mild dysarthria.  -  Vision:  no hemianopsia or ptosis.  -  Facial movement (CN VII): symmetrical.  -  Motor:  RUE: 4/5.  LUE: 5/5.  RLE: proximal 2/5, distal 4-/5.  LLE: 5/5.   Skin: Skin is warm and dry. No rash noted.   Psychiatric: He has a normal mood and affect. His behavior is normal. Thought content normal. Cognition and memory are impaired.   Vitals reviewed.        Diagnostic Results:   Labs: Reviewed  X-Ray: Reviewed  CT: Reviewed

## 2017-07-27 NOTE — PLAN OF CARE
Problem: SLP Goal  Goal: SLP Goal  Speech Language Pathology Goals  Goals expected to be met by 8/1  1. Pt will tolerate dental soft diet with thin liquids without overt s/s aspiration.  2. Pt will tolerate trials of regular with adequate oral clearance and no overt s/s aspiration.  3. Pt will Ox4 with min cues and external aids.  4. Pt will complete delayed recall tasks utilizing simple memory strategies with mod cues and 70% accy.  5. Pt will complete further assessment of cognition including sequencing, organization, and reasoning.  6. Pt will complete assessment of reading, writing, visual spatial skills to determine additional need for tx.        Outcome: Ongoing (interventions implemented as appropriate)  Pt continues with impulsive intake management though no overt s/s aspiration.  Rec dental soft diet with thin liquids with continued aspiration precautions. NALDO Campuzano, CCC/SLP  7/27/2017

## 2017-07-27 NOTE — PT/OT/SLP PROGRESS
Occupational Therapy  Treatment    Vaughn Retana   MRN: [id number]   Admitting Diagnosis: Nontraumatic intracerebral hemorrhage, unspecified    OT Date of Treatment: 07/27/17   OT Start Time: 0925  OT Stop Time: 0949  OT Total Time (min): 24 min    Billable Minutes:  Therapeutic Activity 16 minutes and Therapeutic Exercise 8 minutes    General Precautions: Standard, aspiration, fall  Orthopedic Precautions: N/A  Braces: N/A    Do you have any cultural, spiritual, Nondenominational conflicts, given your current situation?: none    Subjective:  Communicated with nurse prior to session.  Pt agreeable to skilled OT services.  Pain/Comfort  Pain Rating 1: 0/10  Pain Rating Post-Intervention 1: 0/10    Objective:  Patient found with: telemetry   Pt received w/ HOB elevated. Pt required max encouragement to participate in session. Pt only wiling to participate in session if writing therapist could beat pt in best 2/3 rock, paper, scissors match. Writing therapist won.     Functional Mobility:  Bed Mobility:  Rolling/Turning to Left: Stand by assistance  Scooting/Bridging: Stand by Assistance  Supine to Sit: Moderate Assistance  Sit to Supine: Minimum Assistance    Transfers:   Sit <> Stand Assistance: Minimum Assistance  Sit <> Stand Assistive Device: No Assistive Device    Functional Ambulation: only static stand.     Balance:   Static Sit: FAIR+: Able to take MINIMAL challenges from all directions (Pt would sway side to side during static sit, but never had a LOB.   Dynamic Sit: FAIR+: Maintains balance through MINIMAL excursions of active trunk motion  Static Stand: POOR+: Needs MINIMAL assist to maintain  Dynamic stand: POOR: N/A    Therapeutic Activities and Exercises:  Pt performed BUE strengthening of triceps ext for 15 reps w/ yellow resistive theraband.   Pt performed BUE strengthening of seated rows for 15 reps w/ yellow resistive theraband.   Pt performed BUE strengthening of bicep curls for 15 reps w/ yellow resistive  "theraband.   Pt performed BUE hand strengthening of gross grasp squeezes for 3x10 reps w/ resistive ball.  Pt performed sit<>stand for 10 reps from EOB at min a w/ knee blocked.    AM-PAC 6 CLICK ADL   How much help from another person does this patient currently need?   1 = Unable, Total/Dependent Assistance  2 = A lot, Maximum/Moderate Assistance  3 = A little, Minimum/Contact Guard/Supervision  4 = None, Modified [place]/Independent    Putting on and taking off regular lower body clothing? : 1  Bathing (including washing, rinsing, drying)?: 2  Toileting, which includes using toilet, bedpan, or urinal? : 2  Putting on and taking off regular upper body clothing?: 2  Taking care of personal grooming such as brushing teeth?: 2  Eating meals?: 1  Total Score: 10     AM-PAC Raw Score CMS "G-Code Modifier Level of Impairment Assistance   6 % Total / Unable   7 - 8 CM 80 - 100% Maximal Assist   9-13 CL 60 - 80% Moderate Assist   14 - 19 CK 40 - 60% Moderate Assist   20 - 22 CJ 20 - 40% Minimal Assist   23 CI 1-20% SBA / CGA   24 CH 0% Independent/ Mod I       Patient left HOB elevated with call button in reach and sittier and PCT present    ASSESSMENT:  Vaughn Retana is a 52 y.o. male with a medical diagnosis of Nontraumatic intracerebral hemorrhage, unspecified and presents with impairments listed below. Pt did well to tolerate session once he began to participate. Pt displayed increased strength w/ RLE during sit<>stands. Pt displayed decreased R hand gross  as evidence of pt loosing  on therband and attempting to use L hand to assist R hand gross grasp squeeze activity.  Pt would benefit from skilled OT services to improve independence and overall occupational functioning.    Rehab identified problem list/impairments: Rehab identified problem list/impairments: weakness, impaired endurance, impaired self care skills, impaired functional mobilty, gait instability, impaired balance, decreased " coordination, decreased lower extremity function, decreased safety awareness    Rehab potential is good.    Activity tolerance: Good    Discharge recommendations: Discharge Facility/Level Of Care Needs: rehabilitation facility     Barriers to discharge: Barriers to Discharge: Inaccessible home environment    Equipment recommendations:       GOALS:    Occupational Therapy Goals        Problem: Occupational Therapy Goal    Goal Priority Disciplines Outcome Interventions   Occupational Therapy Goal     OT, PT/OT Ongoing (interventions implemented as appropriate)    Description:  Goals set 7/24 to be addressed for 14 days with expiration date 8/7:   Patient will increase functional independence with ADLs by performing:    Patient will demonstrate rolling to the right with modified independence  Patient will demonstrate rolling to the left with modified independence  Patient will demonstrate supine to sit with SBA  Patient will demonstrate stand-pivot transfers with min A  Patient will demonstrate upper body dressing with min A while seated EOB  Patient will demonstrate lower body dressing with min A while seated EOB  Patient will demonstrate grooming while standing mod A  Patient will demonstrate toileting with mod A for managing clothes and hygiene  Patient will demonstrate bathing while seated EOB with mod A  Patient's family/caregiver will demonstrate independence and safety with assisting patient with self-care and functional mobility.  Patient and or patient's family will verbalize understanding of stroke prevention guidelines, personal risk factors and stroke warning signs via teachback method.                       Plan:  Patient to be seen 6 x/week to address the above listed problems via self-care/home management, therapeutic activities, therapeutic exercises, neuromuscular re-education, cognitive retraining, sensory integration  Plan of Care expires: 08/22/17  Plan of Care reviewed with: patient          Raghavendra Sanchez, OT  07/27/2017

## 2017-07-27 NOTE — ASSESSMENT & PLAN NOTE
Diarrhea following enema 7/26  -In setting of infectious workup, will send for C diff if diarrhea continues.

## 2017-07-27 NOTE — PLAN OF CARE
Problem: Occupational Therapy Goal  Goal: Occupational Therapy Goal  Goals set 7/24 to be addressed for 14 days with expiration date 8/7:   Patient will increase functional independence with ADLs by performing:    Patient will demonstrate rolling to the right with modified independence  Patient will demonstrate rolling to the left with modified independence  Patient will demonstrate supine to sit with SBA  Patient will demonstrate stand-pivot transfers with min A  Patient will demonstrate upper body dressing with min A while seated EOB  Patient will demonstrate lower body dressing with min A while seated EOB  Patient will demonstrate grooming while standing mod A  Patient will demonstrate toileting with mod A for managing clothes and hygiene  Patient will demonstrate bathing while seated EOB with mod A  Patient's family/caregiver will demonstrate independence and safety with assisting patient with self-care and functional mobility.  Patient and or patient's family will verbalize understanding of stroke prevention guidelines, personal risk factors and stroke warning signs via teachback method.      Continue OT POC    Comments: Raghavendra Sanchez OTR/EARLENE  7/27/2017

## 2017-07-27 NOTE — PLAN OF CARE
Pt has been medically accepted to [facility], can d/c tomorrow if medically stable for d/c.  CM confirmed with HD 87902, pt on schedule for tomorrow am.  Can potentially d/c tomorrow after HD if cleared by MD.    Miri Boles, RN  Case Management  [id number]

## 2017-07-27 NOTE — PT/OT/SLP PROGRESS
"Speech Language Pathology  Treatment    Vaughn Retana   MRN: [id number]   Admitting Diagnosis: Nontraumatic intracerebral hemorrhage, unspecified    Diet recommendations: Solid Diet Level: Dental Soft  Liquid Diet Level: Thin Feed only when awake/alert, No straws, HOB to 90 degrees, Small bites/sips, 1 bite/sip at a time, Meds whole 1 at a time, Eliminate distractions and Avoid talking while eating    SLP Treatment Date: 07/27/17  Speech Start Time: 1119     Speech Stop Time: 1138     Speech Total (min): 19 min       TREATMENT BILLABLE MINUTES:  Speech Therapy Individual 9 and Treatment Swallowing Dysfunction 10    Has the patient been evaluated by SLP for swallowing? : Yes  Keep patient NPO?: No   General Precautions: Standard, aspiration, fall          Subjective:  "I know the safest way.  I'm not here for that!" re: swallow safety    Pain/Comfort  Pain Rating 1: 0/10  Pain Rating Post-Intervention 1: 0/10    Objective:   Patient found with: telemetry (sitter at bedside)      Pt alert though minimally verbal initially.  PT finishing session, reports some pt lethargy.  Pt agreeable to po trials.  Pt observed self presenting isabel cracker x1 and two cups of thin liquids via cyclical sips from cup rim with no overt s/s aspiration.  Pt with impaired rotational chew though adequate oral clearance given liquid wash.  Pt continues with large bites/sips, quick presentation of PO despite education on safe intake management.  He was able to orient to self and place and partial situation indptly and to date given mod cues and external aid.  Pt denied weakness in R hand though  appeared weakened on writing utensil with decreased writing legibility.  Reading at sentence level was WFL.  Simple scanning task completed with 100% accy.  White board updated. Pt educated on continued SLP POC and safety considerations (fall, aspiration, etc) s/p stroke.  Pt nodded understanding though eduction to be ongoing.       "     Assessment:  Vaughn Retana is a 52 y.o. male with a medical diagnosis of Nontraumatic intracerebral hemorrhage, unspecified and presents with cognitive linguistic impairment.    Discharge recommendations: Discharge Facility/Level Of Care Needs: rehabilitation facility     Goals:    SLP Goals        Problem: SLP Goal    Goal Priority Disciplines Outcome   SLP Goal     SLP Ongoing (interventions implemented as appropriate)   Description:  Speech Language Pathology Goals  Goals expected to be met by 8/1  1. Pt will tolerate dental soft diet with thin liquids without overt s/s aspiration.  2. Pt will tolerate trials of regular with adequate oral clearance and no overt s/s aspiration.  3. Pt will Ox4 with min cues and external aids.  4. Pt will complete delayed recall tasks utilizing simple memory strategies with mod cues and 70% accy.  5. Pt will complete further assessment of cognition including sequencing, organization, and reasoning.  6. Pt will complete assessment of reading, writing, visual spatial skills to determine additional need for tx.                          Plan:   Patient to be seen Therapy Frequency: 5 x/week   Plan of Care expires: 08/24/17  Plan of Care reviewed with: patient  SLP Follow-up?: Yes  SLP - Next Visit Date: 07/28/17           NALDO Campuzano, CCC-SLP  07/27/2017

## 2017-07-27 NOTE — ASSESSMENT & PLAN NOTE
-Fever and leukocytosis developed 7/27  -CXR negative for opacifications  -Amylase, lipase wnl  -Procalcitonin elevated to 1.32, suggestive of bacterial infection in setting of ESRD    -UA (pt reports making small amount of urine? Although ESRD)  -Blood cultures x2, C diff  -US of all 4 extremities to look for DVT  -If fever reoccurs or pt develops symptoms, low threshold to initiate broad-spectrum antibiotics.

## 2017-07-28 VITALS
HEART RATE: 82 BPM | BODY MASS INDEX: 24.66 KG/M2 | RESPIRATION RATE: 18 BRPM | WEIGHT: 153.44 LBS | SYSTOLIC BLOOD PRESSURE: 106 MMHG | HEIGHT: 66 IN | OXYGEN SATURATION: 95 % | TEMPERATURE: 98 F | DIASTOLIC BLOOD PRESSURE: 58 MMHG

## 2017-07-28 PROBLEM — I82.409 DVT (DEEP VENOUS THROMBOSIS): Status: ACTIVE | Noted: 2017-07-28

## 2017-07-28 PROBLEM — E83.9 CHRONIC KIDNEY DISEASE-MINERAL AND BONE DISORDER: Chronic | Status: ACTIVE | Noted: 2017-07-28

## 2017-07-28 PROBLEM — D72.829 LEUKOCYTOSIS: Status: ACTIVE | Noted: 2017-07-28

## 2017-07-28 PROBLEM — N18.9 CHRONIC KIDNEY DISEASE-MINERAL AND BONE DISORDER: Chronic | Status: ACTIVE | Noted: 2017-07-28

## 2017-07-28 PROBLEM — M89.9 CHRONIC KIDNEY DISEASE-MINERAL AND BONE DISORDER: Chronic | Status: ACTIVE | Noted: 2017-07-28

## 2017-07-28 LAB
ALBUMIN SERPL BCP-MCNC: 3 G/DL
ALP SERPL-CCNC: 176 U/L
ALT SERPL W/O P-5'-P-CCNC: 9 U/L
ANION GAP SERPL CALC-SCNC: 13 MMOL/L
APTT BLDCRRT: 27.7 SEC
AST SERPL-CCNC: 20 U/L
BASOPHILS # BLD AUTO: 0.02 K/UL
BASOPHILS NFR BLD: 0.2 %
BILIRUB SERPL-MCNC: 0.5 MG/DL
BUN SERPL-MCNC: 45 MG/DL
CALCIUM SERPL-MCNC: 8.9 MG/DL
CHLORIDE SERPL-SCNC: 98 MMOL/L
CO2 SERPL-SCNC: 25 MMOL/L
CREAT SERPL-MCNC: 11.4 MG/DL
DIFFERENTIAL METHOD: ABNORMAL
EOSINOPHIL # BLD AUTO: 0.9 K/UL
EOSINOPHIL NFR BLD: 7.2 %
ERYTHROCYTE [DISTWIDTH] IN BLOOD BY AUTOMATED COUNT: 13.7 %
EST. GFR  (AFRICAN AMERICAN): 5.2 ML/MIN/1.73 M^2
EST. GFR  (NON AFRICAN AMERICAN): 4.5 ML/MIN/1.73 M^2
GLUCOSE SERPL-MCNC: 108 MG/DL
HCT VFR BLD AUTO: 28.3 %
HGB BLD-MCNC: 9.5 G/DL
INR PPP: 1
LYMPHOCYTES # BLD AUTO: 2.5 K/UL
LYMPHOCYTES NFR BLD: 19.1 %
MAGNESIUM SERPL-MCNC: 2.3 MG/DL
MCH RBC QN AUTO: 31.7 PG
MCHC RBC AUTO-ENTMCNC: 33.6 G/DL
MCV RBC AUTO: 94 FL
MONOCYTES # BLD AUTO: 1.1 K/UL
MONOCYTES NFR BLD: 8.8 %
NEUTROPHILS # BLD AUTO: 8.4 K/UL
NEUTROPHILS NFR BLD: 64.5 %
PHOSPHATE SERPL-MCNC: 3.6 MG/DL
PLATELET # BLD AUTO: 192 K/UL
PMV BLD AUTO: 10.5 FL
POCT GLUCOSE: 108 MG/DL (ref 70–110)
POTASSIUM SERPL-SCNC: 4.3 MMOL/L
PROT SERPL-MCNC: 7.7 G/DL
PROTHROMBIN TIME: 10.5 SEC
RBC # BLD AUTO: 3 M/UL
SODIUM SERPL-SCNC: 136 MMOL/L
WBC # BLD AUTO: 12.95 K/UL

## 2017-07-28 PROCEDURE — 25000003 PHARM REV CODE 250: Performed by: NURSE PRACTITIONER

## 2017-07-28 PROCEDURE — 25000003 PHARM REV CODE 250: Performed by: STUDENT IN AN ORGANIZED HEALTH CARE EDUCATION/TRAINING PROGRAM

## 2017-07-28 PROCEDURE — 80053 COMPREHEN METABOLIC PANEL: CPT

## 2017-07-28 PROCEDURE — 85025 COMPLETE CBC W/AUTO DIFF WBC: CPT

## 2017-07-28 PROCEDURE — 85730 THROMBOPLASTIN TIME PARTIAL: CPT

## 2017-07-28 PROCEDURE — 97535 SELF CARE MNGMENT TRAINING: CPT

## 2017-07-28 PROCEDURE — A4216 STERILE WATER/SALINE, 10 ML: HCPCS | Performed by: NURSE PRACTITIONER

## 2017-07-28 PROCEDURE — 80100016 HC MAINTENANCE HEMODIALYSIS

## 2017-07-28 PROCEDURE — 83735 ASSAY OF MAGNESIUM: CPT

## 2017-07-28 PROCEDURE — 25000003 PHARM REV CODE 250: Performed by: INTERNAL MEDICINE

## 2017-07-28 PROCEDURE — 36415 COLL VENOUS BLD VENIPUNCTURE: CPT

## 2017-07-28 PROCEDURE — 63600175 PHARM REV CODE 636 W HCPCS: Performed by: PHYSICIAN ASSISTANT

## 2017-07-28 PROCEDURE — 99233 SBSQ HOSP IP/OBS HIGH 50: CPT | Mod: GC,,, | Performed by: PSYCHIATRY & NEUROLOGY

## 2017-07-28 PROCEDURE — 84100 ASSAY OF PHOSPHORUS: CPT

## 2017-07-28 PROCEDURE — 85610 PROTHROMBIN TIME: CPT

## 2017-07-28 PROCEDURE — 97530 THERAPEUTIC ACTIVITIES: CPT

## 2017-07-28 RX ORDER — BISACODYL 10 MG
10 SUPPOSITORY, RECTAL RECTAL DAILY PRN
Refills: 0
Start: 2017-07-28 | End: 2017-09-06

## 2017-07-28 RX ORDER — AMOXICILLIN 250 MG
1 CAPSULE ORAL 2 TIMES DAILY
Start: 2017-07-28 | End: 2017-09-06

## 2017-07-28 RX ORDER — ATORVASTATIN CALCIUM 80 MG/1
80 TABLET, FILM COATED ORAL DAILY
Start: 2017-07-28 | End: 2017-10-20 | Stop reason: SDUPTHER

## 2017-07-28 RX ORDER — POLYETHYLENE GLYCOL 3350 17 G/17G
17 POWDER, FOR SOLUTION ORAL DAILY PRN
Refills: 0
Start: 2017-07-28 | End: 2017-09-06

## 2017-07-28 RX ORDER — GABAPENTIN 100 MG/1
100 CAPSULE ORAL DAILY
Start: 2017-07-28 | End: 2017-09-06

## 2017-07-28 RX ADMIN — GABAPENTIN 100 MG: 100 CAPSULE ORAL at 02:07

## 2017-07-28 RX ADMIN — HEPARIN SODIUM 5000 UNITS: 5000 INJECTION, SOLUTION INTRAVENOUS; SUBCUTANEOUS at 06:07

## 2017-07-28 RX ADMIN — Medication 3 ML: at 02:07

## 2017-07-28 RX ADMIN — HEPARIN SODIUM 5000 UNITS: 5000 INJECTION, SOLUTION INTRAVENOUS; SUBCUTANEOUS at 02:07

## 2017-07-28 RX ADMIN — POLYETHYLENE GLYCOL 3350 17 G: 17 POWDER, FOR SOLUTION ORAL at 02:07

## 2017-07-28 RX ADMIN — ATORVASTATIN CALCIUM 80 MG: 20 TABLET, FILM COATED ORAL at 02:07

## 2017-07-28 RX ADMIN — PANTOPRAZOLE SODIUM 40 MG: 40 TABLET, DELAYED RELEASE ORAL at 02:07

## 2017-07-28 RX ADMIN — SODIUM CHLORIDE: 0.9 INJECTION, SOLUTION INTRAVENOUS at 08:07

## 2017-07-28 RX ADMIN — STANDARDIZED SENNA CONCENTRATE AND DOCUSATE SODIUM 1 TABLET: 8.6; 5 TABLET, FILM COATED ORAL at 02:07

## 2017-07-28 RX ADMIN — AMLODIPINE BESYLATE 10 MG: 10 TABLET ORAL at 06:07

## 2017-07-28 RX ADMIN — Medication 3 ML: at 06:07

## 2017-07-28 NOTE — PLAN OF CARE
Problem: Occupational Therapy Goal  Goal: Occupational Therapy Goal  Goals set 7/24 to be addressed for 14 days with expiration date 8/7:   Patient will increase functional independence with ADLs by performing:    Patient will demonstrate rolling to the right with modified independence  Patient will demonstrate rolling to the left with modified independence  Patient will demonstrate supine to sit with SBA  Patient will demonstrate stand-pivot transfers with min A  Patient will demonstrate upper body dressing with min A while seated EOB  Patient will demonstrate lower body dressing with min A while seated EOB  Patient will demonstrate grooming while standing mod A  Patient will demonstrate toileting with mod A for managing clothes and hygiene  Patient will demonstrate bathing while seated EOB with mod A  Patient's family/caregiver will demonstrate independence and safety with assisting patient with self-care and functional mobility.  Patient and or patient's family will verbalize understanding of stroke prevention guidelines, personal risk factors and stroke warning signs via teachback method.      Outcome: Ongoing (interventions implemented as appropriate)  Goals remain appropriate

## 2017-07-28 NOTE — SUBJECTIVE & OBJECTIVE
Interval History: Patient seen on HD. Plan for discharge to rehab after HD.    Review of patient's allergies indicates:   Allergen Reactions    Fosrenol [lanthanum] Nausea And Vomiting     Nausea and vomiting     Current Facility-Administered Medications   Medication Frequency    0.9%  NaCl infusion PRN    0.9%  NaCl infusion Once    0.9%  NaCl infusion PRN    0.9%  NaCl infusion Once    acetaminophen tablet 650 mg Q6H PRN    amlodipine tablet 10 mg QAM    atorvastatin tablet 80 mg Daily    bisacodyl suppository 10 mg Daily PRN    carvedilol tablet 25 mg BID WM    cinacalcet tablet 60 mg Daily with breakfast    dextrose 50% injection 12.5 g PRN    dextrose 50% injection 25 g PRN    gabapentin capsule 100 mg Daily    glucagon (human recombinant) injection 1 mg PRN    glucose chewable tablet 16 g PRN    glucose chewable tablet 24 g PRN    heparin (porcine) injection 5,000 Units Q8H    hydrALAZINE injection 10 mg Q4H PRN    insulin aspart pen 1-10 Units QID (AC + HS) PRN    labetalol injection 10 mg Q4H PRN    lisinopril tablet 40 mg QHS    ondansetron injection 8 mg Q8H PRN    pantoprazole EC tablet 40 mg BID    polyethylene glycol packet 17 g Daily    senna-docusate 8.6-50 mg per tablet 1 tablet BID    sodium chloride 0.9% flush 3 mL Q8H       Objective:     Vital Signs (Most Recent):  Temp: 98.3 °F (36.8 °C) (07/28/17 0800)  Pulse: 79 (07/28/17 1015)  Resp: 18 (07/28/17 0800)  BP: 119/66 (07/28/17 1015)  SpO2: 95 % (07/28/17 0400)  O2 Device (Oxygen Therapy): room air (07/28/17 0800) Vital Signs (24h Range):  Temp:  [97.1 °F (36.2 °C)-99.1 °F (37.3 °C)] 98.3 °F (36.8 °C)  Pulse:  [73-87] 79  Resp:  [16-18] 18  SpO2:  [95 %-98 %] 95 %  BP: (103-140)/(53-83) 119/66     Weight: 69.6 kg (153 lb 7 oz) (07/26/17 0400)  Body mass index is 24.77 kg/m².  Body surface area is 1.8 meters squared.    I/O last 3 completed shifts:  In: 320 [P.O.:320]  Out: -     Physical Exam   Constitutional: He  appears well-developed. No distress.   Cardiovascular: Normal rate and regular rhythm.    Pulmonary/Chest: Effort normal and breath sounds normal. No respiratory distress.   Skin:   RICHARD AVF no issues.       Significant Labs:  All labs within the past 24 hours have been reviewed.     Significant Imaging:  Labs: Reviewed

## 2017-07-28 NOTE — PROGRESS NOTES
Pt completed three hour hemodialysis treatment. Net UF 1 liter. Pt tolerated well. Blood returned with normal saline to right upper arm avf, two needles removed, pressure held for 10 mins, hemostasis achieved, covered with gauze and paper tape. Pt transported via stretcher from dialysis to room 706A by transport.

## 2017-07-28 NOTE — ASSESSMENT & PLAN NOTE
A 52 year old  male, he dialyzes MWF and [facility] under the care of Dr. Hubbard.  His EDW is 73 kg.  Treatment duration of 3:45 minutes.  He has AVF to RICHARD. Arrived due to ICH on CT without contrast, patient doesn't present volume overload on chest x ray.    -No signs of volume overload. Electrolytes and acid/base stable. BP stable. No clinical signs of uremia.   -RICHARD AVF no issues  -Continue MWF HD

## 2017-07-28 NOTE — ASSESSMENT & PLAN NOTE
Phos and Ca at goal. Renal diet. Continue sensipar.  Lab Results   Component Value Date    PTH 1,058.0 (H) 09/04/2016    CALCIUM 8.9 07/28/2017    CAION 1.02 (L) 09/21/2016    PHOS 3.6 07/28/2017

## 2017-07-28 NOTE — ASSESSMENT & PLAN NOTE
Mr. Retana is a 52 y.o. male with PMH significant for multiple subcortical ICH, CHF, ESRD on HD, HCV, HTN, HLD, DM2 presents to hospital with dizziness, RSW, and N/V and found to have a L thalamic ICH on 7/24.  ICH thought to be secondary to HTN.    HOLD antiplatelets in setting of ICH  Atorvastatin 80 Qdaily  .8; TSH 1.273; A1c 5  SBP goal <140  Heparin DVT PPX  PT/OT/ST -> rehab; dental soft/thin    Discharge to rehab.  F/u with PCP in 1 week, vascular neurology in 4-6 weeks.

## 2017-07-28 NOTE — SUBJECTIVE & OBJECTIVE
Neurologic Chief Complaint: L Thalamic ICH    Subjective:     Interval History: Patient is seen for follow-up neurological assessment and treatment recommendations:     [facility] CALLAHAN this AM. No further diarrhea.  Afebrile overnight.  Pt states he feels well.  Asking for extra blanket.    HPI, Past Medical, Family, and Social History remains the same as documented in the initial encounter.     Review of Systems   Constitutional: Negative for chills and fever.   HENT: Negative for hearing loss.    Eyes: Negative for visual disturbance.   Respiratory: Negative for shortness of breath.    Cardiovascular: Negative for chest pain.   Gastrointestinal: Negative for abdominal pain, diarrhea, nausea and vomiting.     Scheduled Meds:   sodium chloride 0.9%   Intravenous Once    amlodipine  10 mg Oral QAM    atorvastatin  80 mg Oral Daily    carvedilol  25 mg Oral BID WM    cinacalcet  60 mg Oral Daily with breakfast    gabapentin  100 mg Oral Daily    heparin (porcine)  5,000 Units Subcutaneous Q8H    lisinopril  40 mg Oral QHS    pantoprazole  40 mg Oral BID    polyethylene glycol  17 g Oral Daily    senna-docusate 8.6-50 mg  1 tablet Oral BID    sodium chloride 0.9%  3 mL Intravenous Q8H     Continuous Infusions:   PRN Meds:sodium chloride 0.9%, sodium chloride 0.9%, acetaminophen, bisacodyl, dextrose 50%, dextrose 50%, glucagon (human recombinant), glucose, glucose, hydrALAZINE, insulin aspart, labetalol, ondansetron    Objective:     Vital Signs (Most Recent):  Temp: 98.2 °F (36.8 °C) (07/28/17 1100)  Pulse: 81 (07/28/17 1120)  Resp: 18 (07/28/17 1100)  BP: (!) 106/58 (07/28/17 1120)  SpO2: 95 % (07/28/17 0400)  BP Location: Left arm    Vital Signs Range (Last 24H):  Temp:  [97.5 °F (36.4 °C)-99.1 °F (37.3 °C)]   Pulse:  [74-87]   Resp:  [16-18]   BP: (103-140)/(53-83)   SpO2:  [95 %-98 %]   BP Location: Left arm    Physical Exam   Constitutional: He appears well-developed. No distress.   HENT:   Head:  Normocephalic and atraumatic.   Cardiovascular: Normal rate and regular rhythm.    Murmur (2/6 systolic murmur emanating from R fistula) heard.  Pulmonary/Chest: Effort normal and breath sounds normal. No respiratory distress. He has no wheezes.   Abdominal: Soft. Bowel sounds are normal. He exhibits no distension and no mass. There is no tenderness. There is no rebound and no guarding.   Musculoskeletal: He exhibits no edema.   L BKA   Neurological: GCS eye subscore is 4 - spontaneous. GCS verbal subscore is 4 - confused. GCS motor subscore is 6 - obeys commands.       Neurological Exam:   LOC: alert and follows requests  Language: Expressive aphasia  Speech: Dysarthria  Orientation: Person, Place, Not oriented to time  Visual Fields (CN II): Full  EOM (CN III, IV, VI): Full/intact  Facial Sensation (CN V): Symmetric  Facial Movement (CN VII): symmetric facial expression    NIH Stroke Scale:    Level of Consciousness: 0 - alert  LOC Questions: 1 - answers one correctly  LOC Commands: 0 - performs both correctly  Best Gaze: 0 - normal  Visual: 0 - no visual loss  Facial Palsy: 0 - normal  Motor Left Arm: 0 - no drift  Motor Right Arm: 0 - no drift  Motor Left Le - no drift  Motor Right Le - drift  Limb Ataxia: 0 - absent  Sensory: 0 - normal  Best Language: 1 - mild to moderate aphasia  Dysarthria: 1 - mild to moderate dysarthria  Extinction and Inattention: 0 - no neglect  NIH Stroke Scale Total: 4  Milo Coma Scale:  Best Eye Response: 4 - spontaneous  Best Motor Response: 6 - obeys commands  Best Verbal Response: 4 - confused  Milo Coma Scale Total: 14  Modified Kary Scale:   Timeline:  Modified Kary Score: 4 - moderately severe disability        Laboratory:  Recent Results (from the past 24 hour(s))   POCT glucose    Collection Time: 17  4:51 PM   Result Value Ref Range    POCT Glucose 175 (H) 70 - 110 mg/dL   POCT glucose    Collection Time: 17  9:50 PM   Result Value Ref Range     POCT Glucose 114 (H) 70 - 110 mg/dL   Protime-INR    Collection Time: 07/28/17  4:28 AM   Result Value Ref Range    Prothrombin Time 10.5 9.0 - 12.5 sec    INR 1.0 0.8 - 1.2   APTT    Collection Time: 07/28/17  4:28 AM   Result Value Ref Range    aPTT 27.7 21.0 - 32.0 sec   Comprehensive metabolic panel    Collection Time: 07/28/17  4:28 AM   Result Value Ref Range    Sodium 136 136 - 145 mmol/L    Potassium 4.3 3.5 - 5.1 mmol/L    Chloride 98 95 - 110 mmol/L    CO2 25 23 - 29 mmol/L    Glucose 108 70 - 110 mg/dL    BUN, Bld 45 (H) 6 - 20 mg/dL    Creatinine 11.4 (H) 0.5 - 1.4 mg/dL    Calcium 8.9 8.7 - 10.5 mg/dL    Total Protein 7.7 6.0 - 8.4 g/dL    Albumin 3.0 (L) 3.5 - 5.2 g/dL    Total Bilirubin 0.5 0.1 - 1.0 mg/dL    Alkaline Phosphatase 176 (H) 55 - 135 U/L    AST 20 10 - 40 U/L    ALT 9 (L) 10 - 44 U/L    Anion Gap 13 8 - 16 mmol/L    eGFR if African American 5.2 (A) >60 mL/min/1.73 m^2    eGFR if non African American 4.5 (A) >60 mL/min/1.73 m^2   CBC auto differential    Collection Time: 07/28/17  4:28 AM   Result Value Ref Range    WBC 12.95 (H) 3.90 - 12.70 K/uL    RBC 3.00 (L) 4.60 - 6.20 M/uL    Hemoglobin 9.5 (L) 14.0 - 18.0 g/dL    Hematocrit 28.3 (L) 40.0 - 54.0 %    MCV 94 82 - 98 fL    MCH 31.7 (H) 27.0 - 31.0 pg    MCHC 33.6 32.0 - 36.0 g/dL    RDW 13.7 11.5 - 14.5 %    Platelets 192 150 - 350 K/uL    MPV 10.5 9.2 - 12.9 fL    Gran # 8.4 (H) 1.8 - 7.7 K/uL    Lymph # 2.5 1.0 - 4.8 K/uL    Mono # 1.1 (H) 0.3 - 1.0 K/uL    Eos # 0.9 (H) 0.0 - 0.5 K/uL    Baso # 0.02 0.00 - 0.20 K/uL    Gran% 64.5 38.0 - 73.0 %    Lymph% 19.1 18.0 - 48.0 %    Mono% 8.8 4.0 - 15.0 %    Eosinophil% 7.2 0.0 - 8.0 %    Basophil% 0.2 0.0 - 1.9 %    Differential Method Automated    Magnesium    Collection Time: 07/28/17  4:28 AM   Result Value Ref Range    Magnesium 2.3 1.6 - 2.6 mg/dL   Phosphorus    Collection Time: 07/28/17  4:28 AM   Result Value Ref Range    Phosphorus 3.6 2.7 - 4.5 mg/dL   POCT glucose    Collection  Time: 07/28/17  8:53 AM   Result Value Ref Range    POCT Glucose 108 70 - 110 mg/dL         Diagnostic Results:  7/28/17 CXR: Per resident interpretation,  No opacities concerning for pneumonia.  Stable when compared to prior.    7/27/17 DVT US BLE: Per prelim report,  No DVT.

## 2017-07-28 NOTE — PLAN OF CARE
Problem: Patient Care Overview  Goal: Individualization & Mutuality  Outcome: Ongoing (interventions implemented as appropriate)  POC reviewed with patient. AAOx3; disoriented to time. VS remained stable throughout the shift. Afebrile. Pt remained free of falls and injuries during the night. Safety precautions remained in place. No new neuro changes. No new skin impairments noted. No c/o pain or nausea/vomiting. Continuous telemetry monitoring remained in place. Pt on hemodialysis MWF. Was unable to get stool specimen to rule out C.Diff due to pt not having any BMs overnight. BG checked; no correction insulin needed. Bed locked and low, side rails up x2, with call light in reach. Will continue to monitor.

## 2017-07-28 NOTE — PT/OT/SLP PROGRESS
Physical Therapy  Treatment    Vaughn Retana   MRN: [id number]   Admitting Diagnosis: Nontraumatic intracerebral hemorrhage, unspecified    PT Received On: 07/28/17  PT Start Time: 1429     PT Stop Time: 1448    PT Total Time (min): 19 min       Billable Minutes:  Therapeutic Activity 19    Treatment Type: Treatment  PT/PTA: PTA     PTA Visit Number: 2       General Precautions: Standard, aspiration, fall (dental soft, thin liquids)  Orthopedic Precautions: N/A   Braces: N/A         Subjective:  Communicated with RN Sol) prior to session.  Pt agreeable to PT session    Pain/Comfort  Pain Rating 1: 0/10  Pain Rating Post-Intervention 1: 0/10    Objective:   Patient found with: bed alarm, telemetry (Pt found sup in bed with no family/caregiver in room)        FUNCTIONAL MOBILITY    Bed Mobility (with vc's for sequencing and safe technique of functional mobility):        Rolling to the L with CGA       Sup > sit at the EOB with min A from L side lying        Sit > sup with SBA       Scooting hips to the EOB upon sitting with SBA x2 scoots       Scooting hips along the EOB to the L requiring mod A x3 scoots    Transfers       Attempted Sit > stand from EOB with no AD requiring total A of 1 helper for hip elevation                        Pt unable to achieve full erect posture       Stand > sit to EOB requiring mod A for controlled descent      THERAPEUTIC ACTIVITIES     SITTING (10-13 min)       Pt tolerates sitting at the EOB with SBA < > min for trunk and R UE support while in weight bearing       Position on mattress with vc's for postural control, weight shift and to look forward.  Pt performs reaching       with B UE in multiple directions with min A for R UE when reaching with L UE.  Also, pt performs unilateral       Elbow leans to the R and to the L requiring min A to return to sitting midline x3-5 rep each side    STANDING (15 sec)       Pt tolerates standing with no AD requiring total A of 1 helper for  balance with vc's upright posture.  Pt       Demonstrates squat position unable to straighten body up      Education:  Education provided to pt regarding: postural control, weight shift and safety. Verbalized understanding.     Whiteboard updated with correct mobility information. RN/PCT notified.  Pt safe to sit at the EOB with therapy ONLY at this time.         AM-PAC 6 CLICK MOBILITY  How much help from another person does this patient currently need?   1 = Unable, Total/Dependent Assistance  2 = A lot, Maximum/Moderate Assistance  3 = A little, Minimum/Contact Guard/Supervision  4 = None, Modified Stark/Independent    Turning over in bed (including adjusting bedclothes, sheets and blankets)?: 3  Sitting down on and standing up from a chair with arms (e.g., wheelchair, bedside commode, etc.): 1  Moving from lying on back to sitting on the side of the bed?: 3  Moving to and from a bed to a chair (including a wheelchair)?: 1  Need to walk in hospital room?: 1  Climbing 3-5 steps with a railing?: 1  Total Score: 10    AM-PAC Raw Score CMS G-Code Modifier Level of Impairment Assistance   6 % Total / Unable   7 - 9 CM 80 - 100% Maximal Assist   10 - 14 CL 60 - 80% Moderate Assist   15 - 19 CK 40 - 60% Moderate Assist   20 - 22 CJ 20 - 40% Minimal Assist   23 CI 1-20% SBA / CGA   24 CH 0% Independent/ Mod I     Patient left sup with HOB elevated with all lines intact, call button in reach, bed alarm on and RN notified.    Assessment:  Vaughn Retana is a 52 y.o. male with a medical diagnosis of Nontraumatic intracerebral hemorrhage, unspecified and presents with R HP, impaired balance and limited endurance for standing activities requiring assistance to avoid falls for safety.  Pt will cont to benefit from skilled PT intervention to address deficits and improve functional mobility.     Rehab identified problem list/impairments: Rehab identified problem list/impairments: weakness, impaired endurance,  impaired sensation, impaired self care skills, impaired functional mobilty, impaired balance, impaired cognition, decreased coordination, decreased upper extremity function, decreased lower extremity function, decreased safety awareness, impaired fine motor, impaired skin    Rehab potential is good.    Activity tolerance: Good    Discharge recommendations: Discharge Facility/Level Of Care Needs: rehabilitation facility     Barriers to discharge: Barriers to Discharge: Inaccessible home environment (ALEXEY)    Equipment recommendations: Equipment Needed After Discharge:  (TBD at next level of care)     GOALS:    Physical Therapy Goals        Problem: Physical Therapy Goal    Goal Priority Disciplines Outcome Goal Variances Interventions   Physical Therapy Goal     PT/OT, PT Ongoing (interventions implemented as appropriate)     Description:  Goals to be met by: 2017     Patient will increase functional independence with mobility by performin. Supine to sit with Stand-by Assistance   NOT MET   2. Sit to supine with Stand-by Assistance    MET 2017  3. Rolling to Left and Right with Stand-by Assistance.   MET 2017  4. Sit to stand transfer with Minimal Assistance using RW   NOT MET  5. Bed to chair transfer with Minimal Assistance using Rolling Walker   NOT MET   6. Gait  x 50 feet with Moderate Assistance using Rolling Walker and prosthetic leg (if available).  NOT MET    7. Ascend/descend 1 flight of stairs with bilateral Handrails and mod assistance to enter home environment.    NOT MET  8. Sitting at edge of bed x15 minutes with Stand-by Assistance and no LOB while performing UE tasks.   NOT MET                       PLAN:    Patient to be seen 6 x/week  to address the above listed problems via gait training, therapeutic activities, therapeutic exercises, neuromuscular re-education  Plan of Care expires: 17  Plan of Care reviewed with: patient         Ann Marie Klein, PTA  2017

## 2017-07-28 NOTE — PLAN OF CARE
[facility]    FACILITY TRANSFER ORDERS      Patient Name: Vaughn Retana  YOB: 1965    PCP: Primary Doctor No   PCP Address: None  PCP Phone Number: None  PCP Fax: None    Encounter Date: 07/28/2017    Admit to: Rehab    Vital Signs:  Routine    Diagnoses:   Active Hospital Problems    Diagnosis  POA    *Nontraumatic intracerebral hemorrhage, unspecified [I61.9]  Yes     Priority: 1 - High    Leukocytosis [D72.829]  Yes     Priority: 2     ESRD on hemodialysis [N18.6, Z99.2]  Not Applicable     Priority: 3      Chronic     MWF at [facility]      DVT (deep venous thrombosis) [I82.409]  Yes    Chronic kidney disease-mineral and bone disorder [N18.9, E83.9, M89.9]  Yes     Chronic    Intraparenchymal hemorrhage of brain [I61.9]  Unknown    Vasogenic cerebral edema [G93.6]  Unknown    Singultus [R06.6]  Yes    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 [I13.2, N18.6, I50.9]  Yes     Chronic    Nausea & vomiting [R11.2]  Yes    Constipation [K59.00]  Yes    Dyslipidemia [E78.5]  Yes     Chronic    Anemia in chr kidney dis [N18.9, D63.1]  Yes     Chronic    Type 2 diabetes mellitus with chronic kidney disease on chronic dialysis [E11.22, N18.6, Z99.2]  Not Applicable     Chronic      Resolved Hospital Problems    Diagnosis Date Resolved POA   No resolved problems to display.       Allergies:  Review of patient's allergies indicates:   Allergen Reactions    Fosrenol [lanthanum] Nausea And Vomiting     Nausea and vomiting       Diet: diabetic diet: 1800 calorie    Activities: Activity as tolerated    Nursing: Per facility routine     Labs: CBC and CMP 3x weekly     CONSULTS:    Physical Therapy to evaluate and treat. , Occupational Therapy to evaluate and treat., Speech Therapy to evaluate and treat for Language, Swallowing and Cognition. and  to evaluate for community resources/long-range planning.    MISCELLANEOUS CARE:  Diabetes Care:   SN to perform  and educate Diabetic management with blood glucose monitoring: and Fingerstick blood sugar AC and HS  Dialysis - Monday, Wednesday, Friday    WOUND CARE ORDERS  None    Medications: Review discharge medications with patient and family and provide education.      Current Discharge Medication List      START taking these medications    Details   bisacodyl (DULCOLAX) 10 mg Supp Place 1 suppository (10 mg total) rectally daily as needed.  Refills: 0      gabapentin (NEURONTIN) 100 MG capsule Take 1 capsule (100 mg total) by mouth once daily.      polyethylene glycol (GLYCOLAX) 17 gram PwPk Take 17 g by mouth daily as needed.  Refills: 0      senna-docusate 8.6-50 mg (PERICOLACE) 8.6-50 mg per tablet Take 1 tablet by mouth 2 (two) times daily.         CONTINUE these medications which have CHANGED    Details   atorvastatin (LIPITOR) 80 MG tablet Take 1 tablet (80 mg total) by mouth once daily.         CONTINUE these medications which have NOT CHANGED    Details   acetaminophen (TYLENOL) 500 MG tablet Take 1 tablet (500 mg total) by mouth every 6 (six) hours as needed for Pain.  Refills: 0      amlodipine (NORVASC) 10 MG tablet Take 1 tablet (10 mg total) by mouth every morning.  Qty: 90 tablet, Refills: 4    Associated Diagnoses: Malignant hypertension with heart failure and end-stage renal dis      carvedilol (COREG) 25 MG tablet Take 1 tablet (25 mg total) by mouth 2 (two) times daily with meals.  Qty: 180 tablet, Refills: 4    Associated Diagnoses: Malignant hypertension with heart failure and end-stage renal dis      cinacalcet (SENSIPAR) 60 MG Tab Take 1 tablet (60 mg total) by mouth daily with breakfast.  Qty: 30 tablet, Refills: 3      hydrALAZINE (APRESOLINE) 50 MG tablet Take 1 tablet (50 mg total) by mouth every 8 (eight) hours as needed (systolic blood pressure (top number) > 180).  Qty: 90 tablet, Refills: 4    Associated Diagnoses: Malignant hypertension with heart failure and end-stage renal dis       lisinopril (PRINIVIL,ZESTRIL) 40 MG tablet Take 1 tablet (40 mg total) by mouth every evening.  Qty: 90 tablet, Refills: 4    Associated Diagnoses: Malignant hypertension with heart failure and end-stage renal dis      omeprazole (PRILOSEC) 40 MG capsule Take 1 capsule (40 mg total) by mouth 2 (two) times daily before meals.  Qty: 90 capsule, Refills: 3      ondansetron (ZOFRAN) 8 MG tablet Take 1 tablet (8 mg total) by mouth every 8 (eight) hours as needed for Nausea.  Qty: 90 tablet, Refills: 4    Associated Diagnoses: Hiccups         STOP taking these medications       calcium carbonate (OS-FERNANDA) 500 mg calcium (1,250 mg) chewable tablet Comments:   Reason for Stopping:         chlorproMAZINE (THORAZINE) 25 MG tablet Comments:   Reason for Stopping:         ondansetron (ZOFRAN-ODT) 8 MG TbDL Comments:   Reason for Stopping:                    _________________________________  Gilles Sparks MD  07/28/2017

## 2017-07-28 NOTE — PROGRESS NOTES
7/28/17 CXR stable when compared to prior.  No opacities concerning for pneumonia visualized.  Prelim report for DVT US BLE negative. Afebrile overnight, leukocytosis with slight decrease/stable.    OK to discharge to rehab.    Gilles Sparks MD  2:34 PM

## 2017-07-28 NOTE — PROGRESS NOTES
PM&R consult follow up.  Please see original consult for detailed note.      Upon visit, patient off floor for HD.  Patient good rehab candidate.  Patient and his family prefer [facility].  Approved for admission.  Will sign off.  Please call with questions/concerns or re-consult if situation changes.    VANITA Gonzalez, FNP-C  Physical Medicine & Rehabilitation   07/28/2017  Spectralink: [id number]

## 2017-07-28 NOTE — SUBJECTIVE & OBJECTIVE
NIH Stroke Scale:  Interval: 7 days or at discharge (whichever comes first)  Level of Consciousness: 0 - alert  LOC Questions: 1 - answers one correctly  LOC Commands: 0 - performs both correctly  Best Gaze: 0 - normal  Visual: 0 - no visual loss  Facial Palsy: 0 - normal  Motor Left Arm: 0 - no drift  Motor Right Arm: 0 - no drift  Motor Left Le - no drift  Motor Right Le - drift  Limb Ataxia: 0 - absent  Sensory: 0 - normal  Best Language: 1 - mild to moderate aphasia  Dysarthria: 1 - mild to moderate dysarthria  Extinction and Inattention: 0 - no neglect  NIH Stroke Scale Total: 4  [place] Coma Scale:  Best Eye Response: 4 - spontaneous  Best Motor Response: 6 - obeys commands  Best Verbal Response: 4 - confused  [place] Coma Scale Total: 14  Modified [place] Scale:   Timeline: At discharge  Modified [place] Score: 4 - moderately severe disability    ICH Scale:   [place] Coma Score: 0 - 13-15  Age > or = 80: 0 - no  ICH Volume > or = 30 mL: 0 - no  Intraventricular Hemorrhage: 0 - no  Infratentorial Origin of Hemorrhage: 0 - no  ICH Scale Total: 0

## 2017-07-28 NOTE — PROGRESS NOTES
Maintenance hemodialysis treatment started to pt right upper arm avf with two 15G needles. Pt tolerated well.

## 2017-07-28 NOTE — PROGRESS NOTES
[facility]  Vascular Neurology  Comprehensive Stroke Center  Progress Note    Assessment/Plan:     7/25/17-neurologically stable, has some mild aphasia and inattention, N/V remains  07/26/17-neurologically stable, off cardene, plans for step down, patient had enema with large bowel movement this morning.  7/27 - fever, leukocytosis.  Fall without head trauma.  Infectious workup initiated.    * Nontraumatic intracerebral hemorrhage, unspecified    Mr. Retana is a 52 y.o. male with PMH significant for multiple subcortical ICH, CHF, ESRD on HD, HCV, HTN, HLD, DM2 presents to hospital with dizziness, RSW, and N/V and found to have a L thalamic ICH on 7/24.  ICH thought to be secondary to HTN.    HOLD antiplatelets in setting of ICH  Atorvastatin 80 Qdaily  .8; TSH 1.273; A1c 5  SBP goal <140  Heparin DVT PPX  PT/OT/ST -> rehab; dental soft/thin    Discharge to rehab.  F/u with PCP in 1 week, vascular neurology in 4-6 weeks.        Leukocytosis    -Fever and leukocytosis developed 7/27  -CXR negative for opacifications  -Amylase, lipase wnl  -Procalcitonin elevated to 1.32, suggestive of bacterial infection in setting of ESRD    -Blood cultures x2: NGTD  -US BLE: Negative for DVT per prelim report    No further episodes of fever, leukocytosis stable/decreasing.          ESRD on hemodialysis    On MWF  -Nephrology consulted, appreciate assistance        Vasogenic cerebral edema    2/2 ICH  -Evident on imaging        Singultus    -Gabapentin 100 Qdaily        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    Stroke risk factor  -Goal SBP <140    -Lisinopril 40 Qdaily  -Carvedilol 25 mg BID  -Amlodipine 10 mg QDaily        Constipation    Diarrhea following enema 7/26  -No further episodes of diarrhea  -Will continue bowel regimen        Nausea & vomiting    Improving, s/p reglan, zofran, and bowel movements  -7/26 QTc 491  -On bland diet           Dyslipidemia    Stroke risk factor  -LDL  123.8, while on atorvastatin 40 at home  -atorvastatin 80            Type 2 diabetes mellitus with chronic kidney disease on chronic dialysis    Stroke risk factor  -A1C 5.0  -SSI            Neurologic Chief Complaint: L Thalamic ICH    Subjective:     Interval History: Patient is seen for follow-up neurological assessment and treatment recommendations:     NAEO.  HD this AM. No further diarrhea.  Afebrile overnight.  Pt states he feels well.  Asking for extra blanket.    HPI, Past Medical, Family, and Social History remains the same as documented in the initial encounter.     Review of Systems   Constitutional: Negative for chills and fever.   HENT: Negative for hearing loss.    Eyes: Negative for visual disturbance.   Respiratory: Negative for shortness of breath.    Cardiovascular: Negative for chest pain.   Gastrointestinal: Negative for abdominal pain, diarrhea, nausea and vomiting.     Scheduled Meds:   sodium chloride 0.9%   Intravenous Once    amlodipine  10 mg Oral QAM    atorvastatin  80 mg Oral Daily    carvedilol  25 mg Oral BID WM    cinacalcet  60 mg Oral Daily with breakfast    gabapentin  100 mg Oral Daily    heparin (porcine)  5,000 Units Subcutaneous Q8H    lisinopril  40 mg Oral QHS    pantoprazole  40 mg Oral BID    polyethylene glycol  17 g Oral Daily    senna-docusate 8.6-50 mg  1 tablet Oral BID    sodium chloride 0.9%  3 mL Intravenous Q8H     Continuous Infusions:   PRN Meds:sodium chloride 0.9%, sodium chloride 0.9%, acetaminophen, bisacodyl, dextrose 50%, dextrose 50%, glucagon (human recombinant), glucose, glucose, hydrALAZINE, insulin aspart, labetalol, ondansetron    Objective:     Vital Signs (Most Recent):  Temp: 98.2 °F (36.8 °C) (07/28/17 1100)  Pulse: 81 (07/28/17 1120)  Resp: 18 (07/28/17 1100)  BP: (!) 106/58 (07/28/17 1120)  SpO2: 95 % (07/28/17 0400)  BP Location: Left arm    Vital Signs Range (Last 24H):  Temp:  [97.5 °F (36.4 °C)-99.1 °F (37.3 °C)]   Pulse:   [74-87]   Resp:  [16-18]   BP: (103-140)/(53-83)   SpO2:  [95 %-98 %]   BP Location: Left arm    Physical Exam   Constitutional: He appears well-developed. No distress.   HENT:   Head: Normocephalic and atraumatic.   Cardiovascular: Normal rate and regular rhythm.    Murmur (2/6 systolic murmur emanating from R fistula) heard.  Pulmonary/Chest: Effort normal and breath sounds normal. No respiratory distress. He has no wheezes.   Abdominal: Soft. Bowel sounds are normal. He exhibits no distension and no mass. There is no tenderness. There is no rebound and no guarding.   Musculoskeletal: He exhibits no edema.   L BKA   Neurological: GCS eye subscore is 4 - spontaneous. GCS verbal subscore is 4 - confused. GCS motor subscore is 6 - obeys commands.       Neurological Exam:   LOC: alert and follows requests  Language: Expressive aphasia  Speech: Dysarthria  Orientation: Person, Place, Not oriented to time  Visual Fields (CN II): Full  EOM (CN III, IV, VI): Full/intact  Facial Sensation (CN V): Symmetric  Facial Movement (CN VII): symmetric facial expression    NIH Stroke Scale:    Level of Consciousness: 0 - alert  LOC Questions: 1 - answers one correctly  LOC Commands: 0 - performs both correctly  Best Gaze: 0 - normal  Visual: 0 - no visual loss  Facial Palsy: 0 - normal  Motor Left Arm: 0 - no drift  Motor Right Arm: 0 - no drift  Motor Left Le - no drift  Motor Right Le - drift  Limb Ataxia: 0 - absent  Sensory: 0 - normal  Best Language: 1 - mild to moderate aphasia  Dysarthria: 1 - mild to moderate dysarthria  Extinction and Inattention: 0 - no neglect  NIH Stroke Scale Total: 4  [place] Coma Scale:  Best Eye Response: 4 - spontaneous  Best Motor Response: 6 - obeys commands  Best Verbal Response: 4 - confused  [place] Coma Scale Total: 14  Modified [place] Scale:   Timeline:  Modified [place] Score: 4 - moderately severe disability        Laboratory:  Recent Results (from the past 24 hour(s))   POCT glucose     Collection Time: 07/27/17  4:51 PM   Result Value Ref Range    POCT Glucose 175 (H) 70 - 110 mg/dL   POCT glucose    Collection Time: 07/27/17  9:50 PM   Result Value Ref Range    POCT Glucose 114 (H) 70 - 110 mg/dL   Protime-INR    Collection Time: 07/28/17  4:28 AM   Result Value Ref Range    Prothrombin Time 10.5 9.0 - 12.5 sec    INR 1.0 0.8 - 1.2   APTT    Collection Time: 07/28/17  4:28 AM   Result Value Ref Range    aPTT 27.7 21.0 - 32.0 sec   Comprehensive metabolic panel    Collection Time: 07/28/17  4:28 AM   Result Value Ref Range    Sodium 136 136 - 145 mmol/L    Potassium 4.3 3.5 - 5.1 mmol/L    Chloride 98 95 - 110 mmol/L    CO2 25 23 - 29 mmol/L    Glucose 108 70 - 110 mg/dL    BUN, Bld 45 (H) 6 - 20 mg/dL    Creatinine 11.4 (H) 0.5 - 1.4 mg/dL    Calcium 8.9 8.7 - 10.5 mg/dL    Total Protein 7.7 6.0 - 8.4 g/dL    Albumin 3.0 (L) 3.5 - 5.2 g/dL    Total Bilirubin 0.5 0.1 - 1.0 mg/dL    Alkaline Phosphatase 176 (H) 55 - 135 U/L    AST 20 10 - 40 U/L    ALT 9 (L) 10 - 44 U/L    Anion Gap 13 8 - 16 mmol/L    eGFR if African American 5.2 (A) >60 mL/min/1.73 m^2    eGFR if non African American 4.5 (A) >60 mL/min/1.73 m^2   CBC auto differential    Collection Time: 07/28/17  4:28 AM   Result Value Ref Range    WBC 12.95 (H) 3.90 - 12.70 K/uL    RBC 3.00 (L) 4.60 - 6.20 M/uL    Hemoglobin 9.5 (L) 14.0 - 18.0 g/dL    Hematocrit 28.3 (L) 40.0 - 54.0 %    MCV 94 82 - 98 fL    MCH 31.7 (H) 27.0 - 31.0 pg    MCHC 33.6 32.0 - 36.0 g/dL    RDW 13.7 11.5 - 14.5 %    Platelets 192 150 - 350 K/uL    MPV 10.5 9.2 - 12.9 fL    Gran # 8.4 (H) 1.8 - 7.7 K/uL    Lymph # 2.5 1.0 - 4.8 K/uL    Mono # 1.1 (H) 0.3 - 1.0 K/uL    Eos # 0.9 (H) 0.0 - 0.5 K/uL    Baso # 0.02 0.00 - 0.20 K/uL    Gran% 64.5 38.0 - 73.0 %    Lymph% 19.1 18.0 - 48.0 %    Mono% 8.8 4.0 - 15.0 %    Eosinophil% 7.2 0.0 - 8.0 %    Basophil% 0.2 0.0 - 1.9 %    Differential Method Automated    Magnesium    Collection Time: 07/28/17  4:28 AM   Result  Value Ref Range    Magnesium 2.3 1.6 - 2.6 mg/dL   Phosphorus    Collection Time: 07/28/17  4:28 AM   Result Value Ref Range    Phosphorus 3.6 2.7 - 4.5 mg/dL   POCT glucose    Collection Time: 07/28/17  8:53 AM   Result Value Ref Range    POCT Glucose 108 70 - 110 mg/dL         Diagnostic Results:  7/28/17 CXR: Per resident interpretation,  No opacities concerning for pneumonia.  Stable when compared to prior.    7/27/17 DVT US BLE: Per prelim report,  No DVT.      Gilles Sparks MD  [facility]  Department of Vascular Neurology   [facility]

## 2017-07-28 NOTE — PLAN OF CARE
Plan for d/c to [facility] on today after CXR.  Orders uploaded in right care for facility to view.  Pt's sister Eugene notified and in agreement with plan, pt states he is pleased to d/c today.   [facility].  Radiology disc sent with Hayder in admissions, transport packet at nurses station.    Nurse to call report to [phone]  Room 205.    Miri Boles RN  Case Management  [id number]

## 2017-07-28 NOTE — NURSING
[facility]  pt. to transport to [facility].  Pt. In stable condition with no complaints.

## 2017-07-28 NOTE — ASSESSMENT & PLAN NOTE
Mr. Retana is a 52 y.o. male with PMH significant for multiple subcortical ICH, CHF, ESRD on HD, HCV, HTN, HLD, DM2 presents to hospital with dizziness, RSW, and N/V and found to have a L thalamic ICH on 7/24.  ICH thought to be secondary to HTN.    HOLD antiplatelets in setting of ICH  Atorvastatin 80 Qdaily  .8; TSH 1.273; A1c 5  SBP goal <140  Heparin DVT PPX  PT/OT/ST -> rehab; dental soft/thin    Discharge to rehab on atorvastatin 80.  F/u with PCP in 1 week, vascular neurology in 4-6 weeks.

## 2017-07-28 NOTE — ASSESSMENT & PLAN NOTE
-Fever and leukocytosis developed 7/27  -CXR negative for opacifications  -Amylase, lipase wnl  -Procalcitonin elevated to 1.32, suggestive of bacterial infection in setting of ESRD    -Blood cultures x2: NGTD  -US BLE: Negative for DVT per prelim report    No further episodes of fever, leukocytosis stable/decreasing.

## 2017-07-28 NOTE — PROGRESS NOTES
[facility]  Nephrology  Progress Note    Patient Name: Vaughn Retana  MRN: [id number]  Admission Date: 7/24/2017  Hospital Length of Stay: 4 days  Attending Provider: Miguel Ángel Lucas MD   Primary Care Physician: Primary Doctor No  Principal Problem:Nontraumatic intracerebral hemorrhage, unspecified    Subjective:     HPI: A 52 year old  male, he dialyzes MWF and [facility] under the care of Dr. Hubbard.  His EDW is 73 kg.  Treatment duration of 3:45 minutes.  He has AVF to RICHARD. Patient presented at [facility] after passing out in his home, which was witnessed by his sister, whom refer that was down, right side weakness and slightly confused. Upon arrival to ED presented with hypertension, head CT without contrast showed ICH, as chest x ray dose note no pleural fluid of any substantial volume is seen on either side or cardiac decompensation.     Interval History: Patient seen on HD. Plan for discharge to rehab after HD.    Review of patient's allergies indicates:   Allergen Reactions    Fosrenol [lanthanum] Nausea And Vomiting     Nausea and vomiting     Current Facility-Administered Medications   Medication Frequency    0.9%  NaCl infusion PRN    0.9%  NaCl infusion Once    0.9%  NaCl infusion PRN    0.9%  NaCl infusion Once    acetaminophen tablet 650 mg Q6H PRN    amlodipine tablet 10 mg QAM    atorvastatin tablet 80 mg Daily    bisacodyl suppository 10 mg Daily PRN    carvedilol tablet 25 mg BID WM    cinacalcet tablet 60 mg Daily with breakfast    dextrose 50% injection 12.5 g PRN    dextrose 50% injection 25 g PRN    gabapentin capsule 100 mg Daily    glucagon (human recombinant) injection 1 mg PRN    glucose chewable tablet 16 g PRN    glucose chewable tablet 24 g PRN    heparin (porcine) injection 5,000 Units Q8H    hydrALAZINE injection 10 mg Q4H PRN    insulin aspart pen 1-10 Units QID (AC + HS) PRN    labetalol injection 10 mg Q4H PRN     lisinopril tablet 40 mg QHS    ondansetron injection 8 mg Q8H PRN    pantoprazole EC tablet 40 mg BID    polyethylene glycol packet 17 g Daily    senna-docusate 8.6-50 mg per tablet 1 tablet BID    sodium chloride 0.9% flush 3 mL Q8H       Objective:     Vital Signs (Most Recent):  Temp: 98.3 °F (36.8 °C) (07/28/17 0800)  Pulse: 79 (07/28/17 1015)  Resp: 18 (07/28/17 0800)  BP: 119/66 (07/28/17 1015)  SpO2: 95 % (07/28/17 0400)  O2 Device (Oxygen Therapy): room air (07/28/17 0800) Vital Signs (24h Range):  Temp:  [97.1 °F (36.2 °C)-99.1 °F (37.3 °C)] 98.3 °F (36.8 °C)  Pulse:  [73-87] 79  Resp:  [16-18] 18  SpO2:  [95 %-98 %] 95 %  BP: (103-140)/(53-83) 119/66     Weight: 69.6 kg (153 lb 7 oz) (07/26/17 0400)  Body mass index is 24.77 kg/m².  Body surface area is 1.8 meters squared.    I/O last 3 completed shifts:  In: 320 [P.O.:320]  Out: -     Physical Exam   Constitutional: He appears well-developed. No distress.   Cardiovascular: Normal rate and regular rhythm.    Pulmonary/Chest: Effort normal and breath sounds normal. No respiratory distress.   Skin:   RICHARD AVF no issues.       Significant Labs:  All labs within the past 24 hours have been reviewed.     Significant Imaging:  Labs: Reviewed    Assessment/Plan:     ESRD on hemodialysis    A 52 year old  male, he dialyzes MWF and [facility] under the care of Dr. Hubbard.  His EDW is 73 kg.  Treatment duration of 3:45 minutes.  He has AVF to RICHARD. Arrived due to ICH on CT without contrast, patient doesn't present volume overload on chest x ray.    -No signs of volume overload. Electrolytes and acid/base stable. BP stable. No clinical signs of uremia.   -RICHARD AVF no issues  -Continue MWF HD          Chronic kidney disease-mineral and bone disorder    Phos and Ca at goal. Renal diet. Continue sensipar.  Lab Results   Component Value Date    PTH 1,058.0 (H) 09/04/2016    CALCIUM 8.9 07/28/2017    CAION 1.02 (L) 09/21/2016    PHOS 3.6 07/28/2017              Anemia in chr kidney dis    Hgb at goal. No JONAS for now.             Thank you for your consult. I will follow-up with patient. Please contact us if you have any additional questions.    Geetha Ramos NP  Nephrology  [facility]    Patient seen and examined with MARYJO Ramos;   I have reviewed and agree with assessment and plan

## 2017-07-28 NOTE — PLAN OF CARE
Possible d/c to [facility] on today pending cxr and us results.  Will follow.    Miri Boles RN  Case Management  [id number]

## 2017-07-28 NOTE — PT/OT/SLP PROGRESS
Speech Language Pathology      Vaughn Retana  MRN: [id number]    Patient not seen today secondary to off the floor at dialysis  . Will follow-up 7/31    NALDO Panda, CCC-SLP  7/28/2017

## 2017-07-28 NOTE — PT/OT/SLP PROGRESS
"Occupational Therapy  Treatment    Vaughn Retana   MRN: [id number]   Admitting Diagnosis: Nontraumatic intracerebral hemorrhage, unspecified    OT Date of Treatment: 07/28/17   OT Start Time: 1304  OT Stop Time: 1323  OT Total Time (min): 19 min    Billable Minutes:  Self Care/Home Management 19    General Precautions: Standard, aspiration, seizure, fall (cardiac)    Do you have any cultural, spiritual, Yarsani conflicts, given your current situation?: none    Subjective:  Communicated with RN prior to session.  Pt reported that he liked for his room to be cold. "I have a whole in my brain."  Pain/Comfort  Pain Rating 1: 0/10  Pain Rating Post-Intervention 1: 0/10    Objective:  Patient found with: telemetry     Functional Mobility:  Bed Mobility:  Scooting/Bridging:  (Min (A) with scooting forward on EOB & up HOB while supine)  Supine to Sit: Maximum Assistance  Sit to Supine: Moderate Assistance    Transfers:   Sit <> Stand Assistance: Maximum Assistance (from EOB)  Sit <> Stand Assistive Device: No Assistive Device    Activities of Daily Living:     UE Dressing Level of Assistance: Maximum assistance (donning gown around back while seated EOB)  LE Dressing Level of Assistance: Total assistance (pt refused to perform despite encouragement & assistance from OT (due to pt fear of falling per pt))    Therapeutic Activities and Exercises:  Pt was oriented to person only therefore provided daily orientation.  Pt required mod-max cues for all cognitive activities/questions during session. Pt required CGA-Mod (A) with postural control while seated EOB due to rightward leaning. Provided verbal & physical cues to facilitate postural control while seated EOB. Provided cues for thoracic & cervical extension while seated EOB.  Provided AAROM with RUE in all planes x 10 reps each while supine.  Pt able to sequence 7/7 days/week with mod cues required.  Pt had no further questions & when asked whether there were any concerns " "pt reported none.      AM-PAC 6 CLICK ADL   How much help from another person does this patient currently need?   1 = Unable, Total/Dependent Assistance  2 = A lot, Maximum/Moderate Assistance  3 = A little, Minimum/Contact Guard/Supervision  4 = None, Modified [place]aves/Independent    Putting on and taking off regular lower body clothing? : 1  Bathing (including washing, rinsing, drying)?: 2  Toileting, which includes using toilet, bedpan, or urinal? : 2  Putting on and taking off regular upper body clothing?: 2  Taking care of personal grooming such as brushing teeth?: 2  Eating meals?: 1  Total Score: 10     AM-PAC Raw Score CMS "G-Code Modifier Level of Impairment Assistance   6 % Total / Unable   7 - 8 CM 80 - 100% Maximal Assist   9-13 CL 60 - 80% Moderate Assist   14 - 19 CK 40 - 60% Moderate Assist   20 - 22 CJ 20 - 40% Minimal Assist   23 CI 1-20% SBA / CGA   24 CH 0% Independent/ Mod I       Patient left supine with all lines intact, call button in reach, bed alarm on, RN notified and white board updated.    ASSESSMENT:  Vaughn Retana is a 52 y.o. male with a medical diagnosis of Nontraumatic intracerebral hemorrhage, unspecified and presents with fair participation and motivation.  Pt continues to require extensive (A) with all ADL's due to postural control & strength deficits.  Pt continues to demonstrate cognitive deficits affecting safety & fall risk.    Rehab identified problem list/impairments: Rehab identified problem list/impairments: weakness, impaired endurance, impaired sensation, impaired self care skills, impaired functional mobilty, impaired balance, impaired cognition, decreased coordination, decreased lower extremity function, decreased upper extremity function, decreased safety awareness    Rehab potential is fair.    Activity tolerance: Fair    Discharge recommendations: Discharge Facility/Level Of Care Needs: rehabilitation facility     GOALS:    Occupational Therapy Goals  "       Problem: Occupational Therapy Goal    Goal Priority Disciplines Outcome Interventions   Occupational Therapy Goal     OT, PT/OT Ongoing (interventions implemented as appropriate)    Description:  Goals set 7/24 to be addressed for 14 days with expiration date 8/7:   Patient will increase functional independence with ADLs by performing:    Patient will demonstrate rolling to the right with modified independence  Patient will demonstrate rolling to the left with modified independence  Patient will demonstrate supine to sit with SBA  Patient will demonstrate stand-pivot transfers with min A  Patient will demonstrate upper body dressing with min A while seated EOB  Patient will demonstrate lower body dressing with min A while seated EOB  Patient will demonstrate grooming while standing mod A  Patient will demonstrate toileting with mod A for managing clothes and hygiene  Patient will demonstrate bathing while seated EOB with mod A  Patient's family/caregiver will demonstrate independence and safety with assisting patient with self-care and functional mobility.  Patient and or patient's family will verbalize understanding of stroke prevention guidelines, personal risk factors and stroke warning signs via teachback method.                       Plan:  Patient to be seen 6 x/week to address the above listed problems via self-care/home management, therapeutic activities, therapeutic exercises, sensory integration, neuromuscular re-education, cognitive retraining  Plan of Care expires: 08/22/17  Plan of Care reviewed with: patient         Vania WILBUR Baker  07/28/2017

## 2017-07-28 NOTE — DISCHARGE SUMMARY
[facility]  Vascular Neurology  Comprehensive Stroke Center  Discharge Summary     Summary:     Admit Date: 2017  6:40 AM    Discharge Date and Time:  2017     Attending Physician: Miguel Ángel Lucas MD     Discharge Provider: Gilles Sparks MD    History of Present Illness: Vaughn Retana is a 52 y.o. male with a PMHx of HF, DM, ESRD on hemodialysis, HCV, ICH (L BG 2013; R BG 2016; R BG 2016; L caudate head 2017), and HTN who presented to OSH with dizziness. Patient became dizzy last night and this morning he had a fall while getting ready for dialysis. He was transferred to [facility] for further care. While in ED, he complained of RSW and N/V.    Hospital Course (synopsis of major diagnoses, care, treatment, and services provided during the course of the hospital stay): 17-neurologically stable, has some mild aphasia and inattention, N/V remains  17-neurologically stable, off cardene, plans for step down, patient had enema with large bowel movement this morning.   - fever, leukocytosis.  Fall without head trauma.  Infectious workup initiated.   - Infectious workup unrevealing, no further fevers, WBC 12.  Discharge to rehab.    NIH Stroke Scale:  Interval: 7 days or at discharge (whichever comes first)  Level of Consciousness: 0 - alert  LOC Questions: 1 - answers one correctly  LOC Commands: 0 - performs both correctly  Best Gaze: 0 - normal  Visual: 0 - no visual loss  Facial Palsy: 0 - normal  Motor Left Arm: 0 - no drift  Motor Right Arm: 0 - no drift  Motor Left Le - no drift  Motor Right Le - drift  Limb Ataxia: 0 - absent  Sensory: 0 - normal  Best Language: 1 - mild to moderate aphasia  Dysarthria: 1 - mild to moderate dysarthria  Extinction and Inattention: 0 - no neglect  NIH Stroke Scale Total: 4  [place] Coma Scale:  Best Eye Response: 4 - spontaneous  Best Motor Response: 6 - obeys commands  Best Verbal Response: 4 - confused  [place]o Coma  Scale Total: 14  Modified [place] Scale:   Timeline: At discharge  Modified [place] Score: 4 - moderately severe disability    ICH Scale:   [place] Coma Score: 0 - 13-15  Age > or = 80: 0 - no  ICH Volume > or = 30 mL: 0 - no  Intraventricular Hemorrhage: 0 - no  Infratentorial Origin of Hemorrhage: 0 - no  ICH Scale Total: 0          Assessment/Plan:     Interventions: None    Complications: None    Research Candidate?:  No    Neurological deficit at discharge: Confusion, Weakness: right     Disposition: Rehab Facility    Final Active Diagnoses:    Diagnosis Date Noted POA    PRINCIPAL PROBLEM:  Nontraumatic intracerebral hemorrhage, unspecified [I61.9] 07/24/2017 Yes    Leukocytosis [D72.829] 07/28/2017 Yes    ESRD on hemodialysis [N18.6, Z99.2] 02/07/2013 Not Applicable     Chronic    DVT (deep venous thrombosis) [I82.409] 07/28/2017 Yes    Chronic kidney disease-mineral and bone disorder [N18.9, E83.9, M89.9] 07/28/2017 Yes     Chronic    Intraparenchymal hemorrhage of brain [I61.9]  Unknown    Vasogenic cerebral edema [G93.6] 07/24/2017 Unknown    Singultus [R06.6] 08/25/2015 Yes    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 [I13.2, N18.6, I50.9] 08/01/2015 Yes     Chronic    Nausea & vomiting [R11.2] 10/12/2014 Yes    Constipation [K59.00] 10/12/2014 Yes    Dyslipidemia [E78.5] 02/07/2013 Yes     Chronic    Anemia in chr kidney dis [N18.9, D63.1] 09/17/2012 Yes     Chronic    Type 2 diabetes mellitus with chronic kidney disease on chronic dialysis [E11.22, N18.6, Z99.2] 07/27/2012 Not Applicable     Chronic      Problems Resolved During this Admission:    Diagnosis Date Noted Date Resolved POA     * Nontraumatic intracerebral hemorrhage, unspecified    Mr. Retana is a 52 y.o. male with PMH significant for multiple subcortical ICH, CHF, ESRD on HD, HCV, HTN, HLD, DM2 presents to hospital with dizziness, RSW, and N/V and found to have a L thalamic ICH on 7/24.  ICH thought to be secondary  to HTN.    HOLD antiplatelets in setting of ICH  Atorvastatin 80 Qdaily  .8; TSH 1.273; A1c 5  SBP goal <140  Heparin DVT PPX  PT/OT/ST -> rehab; dental soft/thin    Discharge to rehab on atorvastatin 80.  F/u with PCP in 1 week, vascular neurology in 4-6 weeks.        Leukocytosis    -Fever and leukocytosis developed 7/27  -CXR negative for opacifications  -Amylase, lipase wnl  -Procalcitonin elevated to 1.32, suggestive of bacterial infection in setting of ESRD    -Blood cultures x2: NGTD  -US BLE: Negative for DVT per prelim report    No further episodes of fever, leukocytosis stable/decreasing.          ESRD on hemodialysis    On MWF  -Nephrology consulted, appreciate assistance        Vasogenic cerebral edema    2/2 ICH  -Evident on imaging        Singultus    -Gabapentin 100 Qdaily        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    Stroke risk factor  -Goal SBP <140    -Lisinopril 40 Qdaily  -Carvedilol 25 mg BID  -Amlodipine 10 mg QDaily        Constipation    Diarrhea following enema 7/26  -No further episodes of diarrhea  -Will continue bowel regimen        Nausea & vomiting    Improving, s/p reglan, zofran, and bowel movements  -7/26 QTc 491  -On bland diet           Dyslipidemia    Stroke risk factor  -.8, while on atorvastatin 40 at home  -atorvastatin 80            Type 2 diabetes mellitus with chronic kidney disease on chronic dialysis    Stroke risk factor  -A1C 5.0  -SSI            Recommendations:     Post-discharge complication risks: Falls, Skin breakdown    Stroke Education given to: patient    Follow-up in Stroke Clinic in 30 days    Discharge Plan:  Antithrombotics: None. Contraindicated due to Hemorrhage any type  Statin: Atorvastatin 80 mg  Aggresive risk factor modification:  Hypertension, High Cholesterol, Diet and Exercise    Follow Up:  Follow-up Information     Charissa Abraham PA-C On 8/24/2017.    Specialty:  Neurology  Why:  @ 8:40  Contact information:  [address]  [place] [address]  [phone]             [facility] - Internal Medicine In 1 week.    Specialty:  Internal Medicine  Why:  ICH f/u  Contact information:  [address]  [facility] [address] [phone]  Additional information:  [facility] B[facility].           [facility] VASCULAR NEUROLOGY In 1 month.    Specialty:  Vascular Neurology  Why:  ICH f/u  Contact information:  [address]  [facility] [address]  [phone]               Patient Instructions:     Ambulatory Referral to Vascular Neurology   Referral Priority: Routine Referral Type: Consultation   Referral Reason: Specialty Services Required    Requested Specialty: Vascular Neurology    Number of Visits Requested: 1      Ambulatory Referral to Internal Medicine   Referral Priority: Routine Referral Type: Consultation   Referral Reason: Specialty Services Required    Requested Specialty: Internal Medicine    Number of Visits Requested: 1      Diet Diabetic 1800 Calories     Call MD for:  increased confusion or weakness     Call MD for:  persistent dizziness, light-headedness, or visual disturbances     Call MD for:  severe persistent headache     Call MD for:  redness, tenderness, or signs of infection (pain, swelling, redness, odor or green/yellow discharge around incision site)     Call MD for:  persistent nausea and vomiting or diarrhea       Medications:  Reconciled Home Medications:   Current Discharge Medication List      START taking these medications    Details   bisacodyl (DULCOLAX) 10 mg Supp Place 1 suppository (10 mg total) rectally daily as needed.  Refills: 0      gabapentin (NEURONTIN) 100 MG capsule Take 1 capsule (100 mg total) by mouth once daily.      polyethylene glycol (GLYCOLAX) 17 gram PwPk Take 17 g by mouth daily as needed.  Refills: 0      senna-docusate 8.6-50 mg (PERICOLACE) 8.6-50 mg per tablet Take 1 tablet by mouth 2 (two) times daily.         CONTINUE  these medications which have CHANGED    Details   atorvastatin (LIPITOR) 80 MG tablet Take 1 tablet (80 mg total) by mouth once daily.         CONTINUE these medications which have NOT CHANGED    Details   acetaminophen (TYLENOL) 500 MG tablet Take 1 tablet (500 mg total) by mouth every 6 (six) hours as needed for Pain.  Refills: 0      amlodipine (NORVASC) 10 MG tablet Take 1 tablet (10 mg total) by mouth every morning.  Qty: 90 tablet, Refills: 4    Associated Diagnoses: Malignant hypertension with heart failure and end-stage renal dis      carvedilol (COREG) 25 MG tablet Take 1 tablet (25 mg total) by mouth 2 (two) times daily with meals.  Qty: 180 tablet, Refills: 4    Associated Diagnoses: Malignant hypertension with heart failure and end-stage renal dis      cinacalcet (SENSIPAR) 60 MG Tab Take 1 tablet (60 mg total) by mouth daily with breakfast.  Qty: 30 tablet, Refills: 3      hydrALAZINE (APRESOLINE) 50 MG tablet Take 1 tablet (50 mg total) by mouth every 8 (eight) hours as needed (systolic blood pressure (top number) > 180).  Qty: 90 tablet, Refills: 4    Associated Diagnoses: Malignant hypertension with heart failure and end-stage renal dis      lisinopril (PRINIVIL,ZESTRIL) 40 MG tablet Take 1 tablet (40 mg total) by mouth every evening.  Qty: 90 tablet, Refills: 4    Associated Diagnoses: Malignant hypertension with heart failure and end-stage renal dis      omeprazole (PRILOSEC) 40 MG capsule Take 1 capsule (40 mg total) by mouth 2 (two) times daily before meals.  Qty: 90 capsule, Refills: 3      ondansetron (ZOFRAN) 8 MG tablet Take 1 tablet (8 mg total) by mouth every 8 (eight) hours as needed for Nausea.  Qty: 90 tablet, Refills: 4    Associated Diagnoses: Hiccups         STOP taking these medications       calcium carbonate (OS-FERNANDA) 500 mg calcium (1,250 mg) chewable tablet Comments:   Reason for Stopping:         chlorproMAZINE (THORAZINE) 25 MG tablet Comments:   Reason for Stopping:          ondansetron (ZOFRAN-ODT) 8 MG TbDL Comments:   Reason for Stopping:               Gilles Sparks MD  [facility]  Department of Vascular Neurology   [facility]

## 2017-07-28 NOTE — PLAN OF CARE
Problem: Physical Therapy Goal  Goal: Physical Therapy Goal  Goals to be met by: 2017     Patient will increase functional independence with mobility by performin. Supine to sit with Stand-by Assistance   NOT MET   2. Sit to supine with Stand-by Assistance    MET 2017  3. Rolling to Left and Right with Stand-by Assistance.   MET 2017  4. Sit to stand transfer with Minimal Assistance using RW   NOT MET  5. Bed to chair transfer with Minimal Assistance using Rolling Walker   NOT MET   6. Gait  x 50 feet with Moderate Assistance using Rolling Walker and prosthetic leg (if available).  NOT MET    7. Ascend/descend 1 flight of stairs with bilateral Handrails and mod assistance to enter home environment.    NOT MET  8. Sitting at edge of bed x15 minutes with Stand-by Assistance and no LOB while performing UE tasks.   NOT MET           Goals remain appropriate.     Ann Marie Klein, PTA.  2017

## 2017-07-28 NOTE — PROGRESS NOTES
"[facility]  Adult Nutrition  Progress Note    SUMMARY     Recommendations    Recommendation/Intervention:   1. Continue current Dental soft, Renal diet.   2. RD to monitor.  Goals: Pt to receive nutrition by RD follow up  Nutrition Goal Status: new  Communication of RD Recs: reviewed with RN    Reason for Assessment    Reason for Assessment: RD follow-up  Diagnosis: hemorrhage  Relevent Medical History: ESRD on HD, HTN, T2DM      General Information Comments: Pt denies N/V. Continues on dental soft diet.     Nutrition Discharge Planning: adequate po intake for optimal nutrition with tolerance    Nutrition Prescription Ordered    Current Diet Order: Dental soft, renal     Evaluation of Received Nutrients/Fluid Intake    % Intake of Estimated Energy Needs: 75 - 100 %  % Meal Intake: 100%     Nutrition Risk Screen     Nutrition Risk Screen: no indicators present    Nutrition/Diet History      Typical Food/Fluid Intake: Pt remains NPO. C/o N/V     Factors Affecting Nutritional Intake: nausea/vomiting, NPO    Labs/Tests/Procedures/Meds    Pertinent Labs Reviewed: reviewed  Pertinent Labs Comments: BUN 33, Cr 9.3, Ph 2.3, Mg 2.0, HgbA1c 5.0  Pertinent Medications Reviewed: reviewed  Pertinent Medications Comments: statin, KCl, metopclopramide, cardene, insulin    Physical Findings    Overall Physical Appearance: nourished     Skin: intact    Anthropometrics    Temp: 98.2 °F (36.8 °C)     Height: 5' 6" (167.6 cm)  Weight Method: Bed Scale  Weight: 69.6 kg (153 lb 7 oz)  Ideal Body Weight (IBW), Male: 142 lb     % Ideal Body Weight, Male (lb): 121.83 lb     BMI (Calculated): 28  BMI Grade: 25 - 29.9 - overweight    Estimated/Assessed Needs    Weight Used For Calorie Calculations: 68.8 kg (151 lb 10.8 oz)      Energy Need Method: Kcal/kg     RMR (Gilbert-St. Jeor Equation): 1475.75        Weight Used For Protein Calculations: 68.8 kg (151 lb 10.8 oz)     Fluid Requirements (mL): 1000mL + UOP    Assessment " and Plan    Nutrition Problem  Inadequate energy intake     Related to (etiology):   Inability to consume sufficient energy     Signs and Symptoms (as evidenced by):   NPO, no diet advancement at this time.     Interventi ons/Recommendations (treatment strategy):  See RD recs above.     Nutrition Diagnosis Status:   New      Monitor and Evaluation    Food and Nutrient Intake: energy intake, food and beverage intake  Food and Nutrient Adminstration: diet order     Anthropometric Measurements: weight, weight change, body mass index  Biochemical Data, Medical Tests and Procedures: electrolyte and renal panel, gastrointestinal profile, glucose/endocrine profile, inflammatory profile, lipid profile  Nutrition-Focused Physical Findings: overall appearance    Nutrition Risk    Level of Risk: other (see comments) (f/u 2x/week)    Nutrition Follow-Up    RD Follow-up?: Yes

## 2017-07-29 NOTE — PT/OT/SLP DISCHARGE
Occupational Therapy Discharge Summary    Vaughn Retana  MRN: [id number]   Nontraumatic intracerebral hemorrhage, unspecified   Patient Discharged from acute Occupational Therapy on 7/29/17.  Please refer to prior OT note dated on 7/28/17 for functional status.     Assessment:   Patient has not met goals.  GOALS:    Occupational Therapy Goals        Problem: Occupational Therapy Goal    Goal Priority Disciplines Outcome Interventions   Occupational Therapy Goal     OT, PT/OT Ongoing (interventions implemented as appropriate)    Description:  Goals set 7/24 to be addressed for 14 days with expiration date 8/7:   Patient will increase functional independence with ADLs by performing:    Patient will demonstrate rolling to the right with modified independence - not met  Patient will demonstrate rolling to the left with modified independence - not met  Patient will demonstrate supine to sit with SBA - not met  Patient will demonstrate stand-pivot transfers with min A - not met  Patient will demonstrate upper body dressing with min A while seated EOB - not met  Patient will demonstrate lower body dressing with min A while seated EOB - not met  Patient will demonstrate grooming while standing mod A - not met  Patient will demonstrate toileting with mod A for managing clothes and hygiene - not met  Patient will demonstrate bathing while seated EOB with mod A - not met  Patient's family/caregiver will demonstrate independence and safety with assisting patient with self-care and functional mobility. - not met  Patient and or patient's family will verbalize understanding of stroke prevention guidelines, personal risk factors and stroke warning signs via teachback method. - not met                     Reasons for Discontinuation of Therapy Services  Transfer to alternate level of care.      Plan:  Patient Discharged to: Inpatient Rehab.    WILBUR Mitchell 7/29/2017

## 2017-07-29 NOTE — PLAN OF CARE
Problem: Occupational Therapy Goal  Goal: Occupational Therapy Goal  Goals set 7/24 to be addressed for 14 days with expiration date 8/7:   Patient will increase functional independence with ADLs by performing:    Patient will demonstrate rolling to the right with modified independence - not met  Patient will demonstrate rolling to the left with modified independence - not met  Patient will demonstrate supine to sit with SBA - not met  Patient will demonstrate stand-pivot transfers with min A - not met  Patient will demonstrate upper body dressing with min A while seated EOB - not met  Patient will demonstrate lower body dressing with min A while seated EOB - not met  Patient will demonstrate grooming while standing mod A - not met  Patient will demonstrate toileting with mod A for managing clothes and hygiene - not met  Patient will demonstrate bathing while seated EOB with mod A - not met  Patient's family/caregiver will demonstrate independence and safety with assisting patient with self-care and functional mobility. - not met  Patient and or patient's family will verbalize understanding of stroke prevention guidelines, personal risk factors and stroke warning signs via teachback method. - not met     Outcome: Ongoing (interventions implemented as appropriate)  No goals met.  Hospital discharge.

## 2017-08-01 LAB
BACTERIA BLD CULT: NORMAL
BACTERIA BLD CULT: NORMAL

## 2017-08-10 NOTE — PT/OT/SLP DISCHARGE
Physical Therapy Discharge Summary    Vaughn Retana  MRN: [id number]   Nontraumatic intracerebral hemorrhage, unspecified   Patient Discharged from acute Physical Therapy on 2017.  Please refer to prior PT noted date on 2017 for functional status.     Assessment:   Patient appropriate for care in another setting.  GOALS:    Physical Therapy Goals        Problem: Physical Therapy Goal    Goal Priority Disciplines Outcome Goal Variances Interventions   Physical Therapy Goal     PT/OT, PT Ongoing (interventions implemented as appropriate)     Description:  Goals to be met by: 2017     Patient will increase functional independence with mobility by performin. Supine to sit with Stand-by Assistance   NOT MET   2. Sit to supine with Stand-by Assistance    MET 2017  3. Rolling to Left and Right with Stand-by Assistance.   MET 2017  4. Sit to stand transfer with Minimal Assistance using RW   NOT MET  5. Bed to chair transfer with Minimal Assistance using Rolling Walker   NOT MET   6. Gait  x 50 feet with Moderate Assistance using Rolling Walker and prosthetic leg (if available).  NOT MET    7. Ascend/descend 1 flight of stairs with bilateral Handrails and mod assistance to enter home environment.    NOT MET  8. Sitting at edge of bed x15 minutes with Stand-by Assistance and no LOB while performing UE tasks.   NOT MET                     Reasons for Discontinuation of Therapy Services  Transfer to alternate level of care.      Plan:  Patient Discharged to: Inpatient Rehab.    Kimberly Guallpa, PT  8/10/2017  [phone] (pager)

## 2017-08-21 VITALS
RESPIRATION RATE: 20 BRPM | HEART RATE: 78 BPM | SYSTOLIC BLOOD PRESSURE: 116 MMHG | DIASTOLIC BLOOD PRESSURE: 80 MMHG | OXYGEN SATURATION: 99 %

## 2017-08-21 NOTE — PROGRESS NOTES
AAOx3.  Lives w/ family but visit occurred alone.  Denies discomfort. HD MWF.  NIHSS-0; does not smoke; refrains from salt, salty and fast food; compliant w/ all meds; walks for exercise.  Education provided on goal of stroke mobile, s/s of stroke, diet, exercise, medication compliance.  Verbalized understanding. Encouraged use of stroke mobile for any concerns.

## 2017-08-22 ENCOUNTER — HOME CARE VISIT (OUTPATIENT)
Dept: NEUROLOGY | Facility: HOSPITAL | Age: 52
End: 2017-08-22

## 2017-09-06 ENCOUNTER — OFFICE VISIT (OUTPATIENT)
Dept: INTERNAL MEDICINE | Facility: CLINIC | Age: 52
End: 2017-09-06
Payer: MEDICARE

## 2017-09-06 VITALS
WEIGHT: 136.88 LBS | BODY MASS INDEX: 22 KG/M2 | HEIGHT: 66 IN | DIASTOLIC BLOOD PRESSURE: 84 MMHG | HEART RATE: 64 BPM | SYSTOLIC BLOOD PRESSURE: 124 MMHG

## 2017-09-06 DIAGNOSIS — I63.89 CEREBROVASCULAR ACCIDENT (CVA) DUE TO OTHER MECHANISM: Primary | ICD-10-CM

## 2017-09-06 DIAGNOSIS — E78.5 HYPERLIPIDEMIA, UNSPECIFIED HYPERLIPIDEMIA TYPE: ICD-10-CM

## 2017-09-06 DIAGNOSIS — I61.8 OTHER NONTRAUMATIC INTRACEREBRAL HEMORRHAGE, UNSPECIFIED LATERALITY: ICD-10-CM

## 2017-09-06 DIAGNOSIS — N18.6 ESRD ON HEMODIALYSIS: Chronic | ICD-10-CM

## 2017-09-06 DIAGNOSIS — I13.2 MALIGNANT HYPERTENSION WITH HEART FAILURE AND END-STAGE RENAL DIS: Chronic | ICD-10-CM

## 2017-09-06 DIAGNOSIS — N18.6 TYPE 2 DIABETES MELLITUS WITH CHRONIC KIDNEY DISEASE ON CHRONIC DIALYSIS, WITHOUT LONG-TERM CURRENT USE OF INSULIN: Chronic | ICD-10-CM

## 2017-09-06 DIAGNOSIS — K21.00 GASTROESOPHAGEAL REFLUX DISEASE WITH ESOPHAGITIS: ICD-10-CM

## 2017-09-06 DIAGNOSIS — N18.6 MALIGNANT HYPERTENSION WITH HEART FAILURE AND END-STAGE RENAL DIS: Chronic | ICD-10-CM

## 2017-09-06 DIAGNOSIS — Z89.512 HISTORY OF LEFT BELOW KNEE AMPUTATION: Chronic | ICD-10-CM

## 2017-09-06 DIAGNOSIS — Z99.2 TYPE 2 DIABETES MELLITUS WITH CHRONIC KIDNEY DISEASE ON CHRONIC DIALYSIS, WITHOUT LONG-TERM CURRENT USE OF INSULIN: Chronic | ICD-10-CM

## 2017-09-06 DIAGNOSIS — E11.22 TYPE 2 DIABETES MELLITUS WITH CHRONIC KIDNEY DISEASE ON CHRONIC DIALYSIS, WITHOUT LONG-TERM CURRENT USE OF INSULIN: Chronic | ICD-10-CM

## 2017-09-06 DIAGNOSIS — Z99.2 ESRD ON HEMODIALYSIS: Chronic | ICD-10-CM

## 2017-09-06 PROCEDURE — 3066F NEPHROPATHY DOC TX: CPT | Mod: ,,, | Performed by: INTERNAL MEDICINE

## 2017-09-06 PROCEDURE — 3044F HG A1C LEVEL LT 7.0%: CPT | Mod: ,,, | Performed by: INTERNAL MEDICINE

## 2017-09-06 PROCEDURE — 99214 OFFICE O/P EST MOD 30 MIN: CPT | Mod: S$PBB,,, | Performed by: INTERNAL MEDICINE

## 2017-09-06 PROCEDURE — 99213 OFFICE O/P EST LOW 20 MIN: CPT | Mod: PBBFAC | Performed by: INTERNAL MEDICINE

## 2017-09-06 PROCEDURE — 99999 PR PBB SHADOW E&M-EST. PATIENT-LVL III: CPT | Mod: PBBFAC,,, | Performed by: INTERNAL MEDICINE

## 2017-09-06 NOTE — PROGRESS NOTES
"Subjective:       Patient ID: Vaughn Retana is a 52 y.o. male.    Chief Complaint: Hospital Follow Up   this is a 52-year-old who presents today for hospital follow-up.  He has a history of prior noncompliance and is currently being assisted by his sister Nidhi Marquez who brings him in today she is trying to assist with his medications patient was recently hospitalized for a hemorrhagic stroke in July after not taking his blood pressure medications regularly according to sister.  He has history of chronic kidney disease chart shows prior history of diabetes and end-stage renal disease on dialysis 3 times weekly.  Patient had episode of bleeding believed to be due from hypertensive stroke he was told to hold anticoagulation and follow-up with neurology.  He was also having difficulty with esophagitis presently taking Prilosec and was to follow-up with GI for recheck they would like assistance scheduling that.  Patient denies difficulty swallowing currently.  She reports his gait has been more challenging since his last stroke he uses a walker and has a prior amputation  But has progressed since his discharge. Sister reports he has not taken very good care of himself and she and her daughter are now trying to assist more with his medical needs     HPI  Review of Systems   Respiratory: Negative for shortness of breath.    Cardiovascular: Negative for chest pain.   Gastrointestinal: Negative for constipation and diarrhea.   Neurological:        Stroke gaining strength back  Walks with walker  Prior leg amputation     Speech disturbance but stable        Objective:     Blood pressure 124/84, pulse 64, height 5' 6" (1.676 m), weight 62.1 kg (136 lb 14.5 oz).    Physical Exam   Constitutional: No distress.   HENT:   Head: Normocephalic.   Mouth/Throat: Oropharynx is clear and moist.   Eyes: No scleral icterus.   Neck: Neck supple.   Cardiovascular: Normal rate, regular rhythm and normal heart sounds.  Exam reveals " no gallop and no friction rub.    No murmur heard.  Pulmonary/Chest: Effort normal and breath sounds normal. No respiratory distress.   Abdominal: Soft. Bowel sounds are normal. He exhibits no mass. There is no tenderness.   Musculoskeletal: He exhibits no edema.   Dialysis acess right arm  Leg amputation   Foot clean and dry    Neurological: He is alert.   Speech disturbance   Answers questions approprately      Skin: No erythema.   Vitals reviewed.      Assessment:       1. Cerebrovascular accident (CVA) due to other mechanism    2. ESRD on hemodialysis    3. Gastroesophageal reflux disease with esophagitis    4. History of left below knee amputation 12/18/13    5. 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    6. Esophagitis determined by endoscopy    7. Other nontraumatic intracerebral hemorrhage, unspecified laterality    8. Hyperlipidemia, unspecified hyperlipidemia type    9. Type 2 diabetes mellitus with chronic kidney disease on chronic dialysis, without long-term current use of insulin        Plan:       Vaughn was seen today for hospital follow up.    Diagnoses and all orders for this visit:    ESRD on hemodialysis  Patient continues to follow with his outlying nephrologist and dialysis center    Gastroesophageal reflux disease with esophagitis  -     Ambulatory consult to Gastroenterology    History of left below knee amputation 12/18/13    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  History of he continues to follow with his dialysis center every Monday Wednesday Friday  He is presently taking amlodipine, carvedilol 25 mg bit and lisinorpil 40 mg     Esophagitis determined by endoscopy  History of continue Prilosec clarified his prescription he should not be taking Protonix and Prilosec  He is currently taking prilosec bid gi follow up as recommended     Cerebrovascular accident (CVA) due to other mechanis  Other nontraumatic intracerebral hemorrhage, unspecified laterality  htn control  compliance reinforced pt off anticoagulation  He will follow up with with neurology   -     Ambulatory Referral to Vascular Neurology    Hyperlipidemia, unspecified hyperlipidemia type   Patient was recently placed on atorvastatin 80 mg increased dose tolerating without difficulty will update fasting labs with his adjustment   -     Basic metabolic panel; Future  -     Hepatic function panel; Future  -     Lipid panel; Future    Type 2 diabetes mellitus with chronic kidney disease on chronic dialysis, without long-term current use of insulin  -     Documented hx of will check hgaic     History of constipation he is asymptomatic has prescriptions for stool softeners MiraLAX but he has not been taking can use as needed     All meds reviewed today with sister     Sister reports they plan to establish with pcp dr. Landeros again he missed last appt

## 2017-09-08 ENCOUNTER — OFFICE VISIT (OUTPATIENT)
Dept: PODIATRY | Facility: CLINIC | Age: 52
End: 2017-09-08
Payer: MEDICARE

## 2017-09-08 VITALS
BODY MASS INDEX: 22.05 KG/M2 | HEIGHT: 66 IN | HEART RATE: 77 BPM | SYSTOLIC BLOOD PRESSURE: 130 MMHG | WEIGHT: 137.19 LBS | RESPIRATION RATE: 18 BRPM | DIASTOLIC BLOOD PRESSURE: 81 MMHG

## 2017-09-08 DIAGNOSIS — L84 CORN OR CALLUS: ICD-10-CM

## 2017-09-08 DIAGNOSIS — Z89.512 S/P BKA (BELOW KNEE AMPUTATION) UNILATERAL, LEFT: ICD-10-CM

## 2017-09-08 DIAGNOSIS — N18.6 ESRD ON HEMODIALYSIS: Primary | ICD-10-CM

## 2017-09-08 DIAGNOSIS — B35.1 ONYCHOMYCOSIS DUE TO DERMATOPHYTE: ICD-10-CM

## 2017-09-08 DIAGNOSIS — E11.51 PERIPHERAL VASCULAR DISEASE IN DIABETES MELLITUS: ICD-10-CM

## 2017-09-08 DIAGNOSIS — Z99.2 ESRD ON HEMODIALYSIS: Primary | ICD-10-CM

## 2017-09-08 PROCEDURE — 99999 PR PBB SHADOW E&M-EST. PATIENT-LVL III: CPT | Mod: PBBFAC,,, | Performed by: PODIATRIST

## 2017-09-08 PROCEDURE — 11720 DEBRIDE NAIL 1-5: CPT | Mod: Q7,59,S$PBB, | Performed by: PODIATRIST

## 2017-09-08 PROCEDURE — 11056 PARNG/CUTG B9 HYPRKR LES 2-4: CPT | Mod: Q7,S$PBB,, | Performed by: PODIATRIST

## 2017-09-08 PROCEDURE — 11056 PARNG/CUTG B9 HYPRKR LES 2-4: CPT | Mod: Q7,PBBFAC | Performed by: PODIATRIST

## 2017-09-08 PROCEDURE — 99213 OFFICE O/P EST LOW 20 MIN: CPT | Mod: PBBFAC | Performed by: PODIATRIST

## 2017-09-08 PROCEDURE — 11720 DEBRIDE NAIL 1-5: CPT | Mod: Q7,59,PBBFAC | Performed by: PODIATRIST

## 2017-09-08 PROCEDURE — 99499 UNLISTED E&M SERVICE: CPT | Mod: S$PBB,,, | Performed by: PODIATRIST

## 2017-09-08 NOTE — PROGRESS NOTES
Subjective:      Patient ID: Vaughn Retana is a 52 y.o. male.    Chief Complaint: PCP (Sergey 9/6/17); Diabetic Foot Exam; Nail Care; and Callouses    Vaughn is a 52 y.o. male who presents to the clinic for evaluation and treatment of high risk feet. Vaughn has a past medical history of Amputation stump pain (9/10/2013); Aspiration pneumonia (7/27/2015); Asterixis (11/8/2016); C. 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 The patient's chief complaint is long, thick toenails. This patient has documented high risk feet requiring routine maintenance secondary to peripheral vascular disease.    PCP: Primary Doctor No    Date Last Seen by PCP:   Chief Complaint   Patient presents with    PCP     Sergey 9/6/17    Diabetic Foot Exam    Nail Care    Callouses         Hemoglobin A1C   Date Value Ref Range Status   09/09/2017 4.4 4.0 - 5.6 % Final     Comment:     According to ADA guidelines, hemoglobin A1c <7.0% represents  optimal control in non-pregnant diabetic patients. Different  metrics may apply to specific patient populations.   Standards of Medical Care in Diabetes-2016.  For the  purpose of screening for the presence of diabetes:  <5.7%     Consistent with the absence of diabetes  5.7-6.4%  Consistent with increasing risk for diabetes   (prediabetes)  >or=6.5%  Consistent with diabetes  Currently, no consensus exists for use of hemoglobin A1c  for diagnosis of diabetes for children.  This Hemoglobin A1c assay has significant interference with fetal   hemoglobin   (HbF). The results are invalid for patients with abnormal amounts of   HbF,   including those with known Hereditary Persistence   of Fetal Hemoglobin. Heterozygous hemoglobin variants (HbAS, HbAC,   HbAD, HbAE, HbA2) do not significantly interfere with this assay;   however, presence of multiple variants in a sample may impact the %   interference.     07/24/2017 5.0 4.0 - 5.6 % Final     Comment:     According to ADA guidelines, hemoglobin A1c <7.0% represents  optimal control in non-pregnant diabetic patients. Different  metrics may apply to specific patient populations.   Standards of Medical Care in Diabetes-2016.  For the purpose of screening for the presence of diabetes:  <5.7%     Consistent with the absence of diabetes  5.7-6.4%  Consistent with increasing risk for diabetes   (prediabetes)  >or=6.5%  Consistent with diabetes  Currently, no consensus exists for use of hemoglobin A1c  for diagnosis of diabetes for children.  This Hemoglobin A1c assay has significant interference with fetal   hemoglobin   (HbF). The results are invalid for patients with abnormal amounts of   HbF,   including those with known Hereditary Persistence   of Fetal Hemoglobin. Heterozygous hemoglobin variants (HbAS, HbAC,   HbAD, HbAE, HbA2) do not significantly interfere with this assay;   however, presence of multiple variants in a sample may impact the %   interference.     06/24/2017 5.1 4.0 - 5.6 % Final     Comment:     According to ADA guidelines, hemoglobin A1c <7.0% represents  optimal control in non-pregnant diabetic patients.  Different  metrics may apply to specific patient populations.   Standards of Medical Care in Diabetes-2016.  For the purpose of screening for the presence of diabetes:  <5.7%     Consistent with the absence of diabetes  5.7-6.4%  Consistent with increasing risk for diabetes   (prediabetes)  >or=6.5%  Consistent with diabetes  Currently, no consensus exists for use of hemoglobin A1c  for diagnosis of diabetes for children.  This Hemoglobin A1c assay has significant interference with fetal   hemoglobin   (HbF). The results are invalid for patients with abnormal amounts of   HbF,   including those with known Hereditary Persistence   of Fetal Hemoglobin. Heterozygous hemoglobin variants (HbAS, HbAC,   HbAD, HbAE, HbA2) do not significantly interfere with this assay;   however, presence of multiple variants in a sample may impact the %   interference.         Review of Systems   Constitution: Negative for chills, decreased appetite and fever.   Cardiovascular: Negative for leg swelling.   Skin: Positive for dry skin and nail changes.   Musculoskeletal: Positive for muscle weakness (s/p CVA). Negative for arthritis, joint pain, joint swelling and myalgias.   Gastrointestinal: Negative for nausea and vomiting.   Neurological: Positive for paresthesias. Negative for loss of balance.           Objective:      Physical Exam   Constitutional: He is oriented to person, place, and time. He appears well-developed and well-nourished.   Cardiovascular:   Dorsalis pedis and posterior tibial pulses are diminished R       Musculoskeletal: Normal range of motion. He exhibits no edema or tenderness.   Adequate joint range of motion without pain, limitation, nor crepitation R joints. Muscle strength is 5/5 in all groups R    S/p L BKA     Neurological: He is alert and oriented to person, place, and time.   [place] 5.07 monofilamant testing is diminished [place] feet. Sharp/dull sensation diminished Bilaterally. Light touch absent  Bilaterally.       Skin: Skin is warm and dry. No ecchymosis and no lesion noted. No erythema.   Nails x5 are elongated by  1-6mm's, thickened by 1-6 mm's, dystrophic, and are darkened in  coloration . Xerosis Bilaterally. No open lesions noted.    Hyperkeratotic tissue noted to medial HIPJ R, distal 2nd toe      Psychiatric: He has a normal mood and affect. His behavior is normal.             Assessment:       Encounter Diagnoses   Name Primary?    ESRD on hemodialysis Yes    Peripheral vascular disease in diabetes mellitus     Onychomycosis due to dermatophyte     Corn or callus     S/P BKA (below knee amputation) unilateral, left          Plan:       Vaughn was seen today for pcp, diabetic foot exam, nail care and callouses.    Diagnoses and all orders for this visit:    ESRD on hemodialysis    Peripheral vascular disease in diabetes mellitus    Onychomycosis due to dermatophyte    Corn or callus    S/P BKA (below knee amputation) unilateral, left      I counseled the patient on his conditions, their implications and medical management.        - Shoe inspection. Diabetic Foot Education. Patient reminded of the importance of good nutrition and blood sugar control to help prevent podiatric complications of diabetes. Patient instructed on proper foot hygeine. We discussed wearing proper shoe gear, daily foot inspections, never walking without protective shoe gear, never putting sharp instruments to feet, routine podiatric nail visits every 2-3 months.      - With patient's permission, nails were aggressively reduced and debrided x 5 to their soft tissue attachment mechanically and with electric , removing all offending nail and debris. Patient relates relief following the procedure. He will continue to monitor the areas daily, inspect his feet, wear protective shoe gear when ambulatory, moisturizer to maintain skin integrity and follow in this office in approximately 2-3 months, sooner p.r.n.    - After  cleansing the  area w/ alcohol prep pad the above mentioned hyperkeratosis was trimmed utilizing No 15 scapel, to a smooth base with out incident. Patient tolerated this  well and reported comfort to the area of R great toe , distal 2nd toe

## 2017-09-09 ENCOUNTER — LAB VISIT (OUTPATIENT)
Dept: LAB | Facility: HOSPITAL | Age: 52
End: 2017-09-09
Attending: INTERNAL MEDICINE
Payer: MEDICARE

## 2017-09-09 DIAGNOSIS — Z99.2 TYPE 2 DIABETES MELLITUS WITH CHRONIC KIDNEY DISEASE ON CHRONIC DIALYSIS, WITHOUT LONG-TERM CURRENT USE OF INSULIN: Chronic | ICD-10-CM

## 2017-09-09 DIAGNOSIS — E11.22 TYPE 2 DIABETES MELLITUS WITH CHRONIC KIDNEY DISEASE ON CHRONIC DIALYSIS, WITHOUT LONG-TERM CURRENT USE OF INSULIN: Chronic | ICD-10-CM

## 2017-09-09 DIAGNOSIS — N18.6 TYPE 2 DIABETES MELLITUS WITH CHRONIC KIDNEY DISEASE ON CHRONIC DIALYSIS, WITHOUT LONG-TERM CURRENT USE OF INSULIN: Chronic | ICD-10-CM

## 2017-09-09 DIAGNOSIS — E78.5 HYPERLIPIDEMIA, UNSPECIFIED HYPERLIPIDEMIA TYPE: ICD-10-CM

## 2017-09-09 LAB
ALBUMIN SERPL BCP-MCNC: 3.3 G/DL
ALP SERPL-CCNC: 245 U/L
ALT SERPL W/O P-5'-P-CCNC: 14 U/L
ANION GAP SERPL CALC-SCNC: 16 MMOL/L
AST SERPL-CCNC: 22 U/L
BILIRUB DIRECT SERPL-MCNC: 0.2 MG/DL
BILIRUB SERPL-MCNC: 0.5 MG/DL
BUN SERPL-MCNC: 32 MG/DL
CALCIUM SERPL-MCNC: 10 MG/DL
CHLORIDE SERPL-SCNC: 95 MMOL/L
CHOLEST SERPL-MCNC: 161 MG/DL
CHOLEST/HDLC SERPL: 3.2 {RATIO}
CO2 SERPL-SCNC: 27 MMOL/L
CREAT SERPL-MCNC: 6.7 MG/DL
EST. GFR  (AFRICAN AMERICAN): 10 ML/MIN/1.73 M^2
EST. GFR  (NON AFRICAN AMERICAN): 9 ML/MIN/1.73 M^2
ESTIMATED AVG GLUCOSE: 80 MG/DL
GLUCOSE SERPL-MCNC: 165 MG/DL
HBA1C MFR BLD HPLC: 4.4 %
HDLC SERPL-MCNC: 50 MG/DL
HDLC SERPL: 31.1 %
LDLC SERPL CALC-MCNC: 89 MG/DL
NONHDLC SERPL-MCNC: 111 MG/DL
POTASSIUM SERPL-SCNC: 3.9 MMOL/L
PROT SERPL-MCNC: 8.5 G/DL
SODIUM SERPL-SCNC: 138 MMOL/L
TRIGL SERPL-MCNC: 110 MG/DL

## 2017-09-09 PROCEDURE — 80076 HEPATIC FUNCTION PANEL: CPT

## 2017-09-09 PROCEDURE — 36415 COLL VENOUS BLD VENIPUNCTURE: CPT

## 2017-09-09 PROCEDURE — 80048 BASIC METABOLIC PNL TOTAL CA: CPT

## 2017-09-09 PROCEDURE — 83036 HEMOGLOBIN GLYCOSYLATED A1C: CPT

## 2017-09-09 PROCEDURE — 80061 LIPID PANEL: CPT

## 2017-09-11 ENCOUNTER — TELEPHONE (OUTPATIENT)
Dept: INTERNAL MEDICINE | Facility: CLINIC | Age: 52
End: 2017-09-11

## 2017-09-11 NOTE — TELEPHONE ENCOUNTER
Spoke w pt sister; informed of results. Pt sister will relay message to pt and she verbalized understanding.

## 2017-09-11 NOTE — TELEPHONE ENCOUNTER
----- Message from Henny Rincon MD sent at 9/11/2017  7:49 AM CDT -----  Call and notify pt labs relatively stable  Cholesterol improved with his increased cholesterol dose  Blood sugar average normal kidney show his end stage kidney disease  Continue his dialysis with his nephrologist

## 2017-09-28 VITALS
DIASTOLIC BLOOD PRESSURE: 88 MMHG | HEART RATE: 92 BPM | SYSTOLIC BLOOD PRESSURE: 138 MMHG | RESPIRATION RATE: 20 BRPM | OXYGEN SATURATION: 100 %

## 2017-09-28 NOTE — PROGRESS NOTES
AAOx3.  Lives w/ family but visit occurred alone.  NIHSS-1; does not smoke; refrains from adding salt, salty and fast food; dialysis MWF, compliant w/ meds, no consistent form of exercise.  Recent hospitalization for stroke event.  Outpatient ST 3x/wk.  Education provided on goal of Stroke Mobile, s/s of stroke, diet, exercise, medications. Verbalized understanding of all instructions provided.  Encouraged to utilize stroke team for any concern.

## 2017-10-20 ENCOUNTER — TELEPHONE (OUTPATIENT)
Dept: NEUROLOGY | Facility: CLINIC | Age: 52
End: 2017-10-20

## 2017-10-20 ENCOUNTER — TELEPHONE (OUTPATIENT)
Dept: INTERNAL MEDICINE | Facility: CLINIC | Age: 52
End: 2017-10-20

## 2017-10-20 DIAGNOSIS — E78.5 HYPERLIPIDEMIA, UNSPECIFIED HYPERLIPIDEMIA TYPE: Primary | ICD-10-CM

## 2017-10-20 RX ORDER — ATORVASTATIN CALCIUM 80 MG/1
80 TABLET, FILM COATED ORAL DAILY
Qty: 30 TABLET | Refills: 2 | Status: SHIPPED | OUTPATIENT
Start: 2017-10-20 | End: 2017-10-25 | Stop reason: SDUPTHER

## 2017-10-20 NOTE — TELEPHONE ENCOUNTER
----- Message from Caio Reza sent at 10/20/2017  9:35 AM CDT -----  Contact: self  Hi  Pt is asking for a refill on lisinopril (PRINIVIL,ZESTRIL) 40 MG tablet which was prescribe by Dr Dueñas. Pt doesn't have a PCP as of yet so he was wondering if Dr Dueñas would refill it for him. Please advise. Thanks!

## 2017-10-20 NOTE — TELEPHONE ENCOUNTER
Spoke with patient, sent prescription for statin refill to pharmacy and referral to internal medicine to establish care with a PCP for continued follow-up.     Explained importance of continuing to take statin and establishing regular care with a provider in internal medicine.    Gilles Sparks MD

## 2017-10-20 NOTE — TELEPHONE ENCOUNTER
Called pt in regards to rx request for lisinopril. No answer. Left voicemail to return office call.

## 2017-10-20 NOTE — TELEPHONE ENCOUNTER
----- Message from Caio Reza sent at 10/20/2017  9:40 AM CDT -----  Contact: self  Pt is asking for a rx refill on atorvastatin (LIPITOR) 80 MG tablet.  Pt doesn't have a PCP as of yet. Please advise. Thanks!

## 2017-10-25 ENCOUNTER — OFFICE VISIT (OUTPATIENT)
Dept: INTERNAL MEDICINE | Facility: CLINIC | Age: 52
End: 2017-10-25
Payer: MEDICARE

## 2017-10-25 VITALS
SYSTOLIC BLOOD PRESSURE: 126 MMHG | HEART RATE: 86 BPM | OXYGEN SATURATION: 93 % | DIASTOLIC BLOOD PRESSURE: 84 MMHG | WEIGHT: 160.69 LBS | BODY MASS INDEX: 25.83 KG/M2 | HEIGHT: 66 IN

## 2017-10-25 DIAGNOSIS — N18.6 ESRD (END STAGE RENAL DISEASE) ON DIALYSIS: ICD-10-CM

## 2017-10-25 DIAGNOSIS — E11.22 TYPE 2 DIABETES MELLITUS WITH CHRONIC KIDNEY DISEASE ON CHRONIC DIALYSIS, WITHOUT LONG-TERM CURRENT USE OF INSULIN: Chronic | ICD-10-CM

## 2017-10-25 DIAGNOSIS — N25.81 SECONDARY HYPERPARATHYROIDISM OF RENAL ORIGIN: Chronic | ICD-10-CM

## 2017-10-25 DIAGNOSIS — Z99.2 ESRD (END STAGE RENAL DISEASE) ON DIALYSIS: ICD-10-CM

## 2017-10-25 DIAGNOSIS — Z86.79 HISTORY OF SPONTANEOUS INTRAPARENCHYMAL INTRACRANIAL HEMORRHAGE ASSOCIATED WITH HYPERTENSION: Primary | Chronic | ICD-10-CM

## 2017-10-25 DIAGNOSIS — E78.5 DYSLIPIDEMIA: Chronic | ICD-10-CM

## 2017-10-25 DIAGNOSIS — I61.9 NONTRAUMATIC INTRACEREBRAL HEMORRHAGE, UNSPECIFIED CEREBRAL LOCATION, UNSPECIFIED LATERALITY: ICD-10-CM

## 2017-10-25 DIAGNOSIS — I50.32 CHRONIC DIASTOLIC HEART FAILURE: Chronic | ICD-10-CM

## 2017-10-25 DIAGNOSIS — Z99.2 TYPE 2 DIABETES MELLITUS WITH CHRONIC KIDNEY DISEASE ON CHRONIC DIALYSIS, WITHOUT LONG-TERM CURRENT USE OF INSULIN: Chronic | ICD-10-CM

## 2017-10-25 DIAGNOSIS — N18.6 ESRD ON HEMODIALYSIS: Chronic | ICD-10-CM

## 2017-10-25 DIAGNOSIS — R06.6 INTRACTABLE HICCUPS: ICD-10-CM

## 2017-10-25 DIAGNOSIS — I13.2 MALIGNANT HYPERTENSION WITH HEART FAILURE AND END-STAGE RENAL DIS: Chronic | ICD-10-CM

## 2017-10-25 DIAGNOSIS — N18.6 TYPE 2 DIABETES MELLITUS WITH CHRONIC KIDNEY DISEASE ON CHRONIC DIALYSIS, WITHOUT LONG-TERM CURRENT USE OF INSULIN: Chronic | ICD-10-CM

## 2017-10-25 DIAGNOSIS — R11.2 NAUSEA AND VOMITING, INTRACTABILITY OF VOMITING NOT SPECIFIED, UNSPECIFIED VOMITING TYPE: ICD-10-CM

## 2017-10-25 DIAGNOSIS — N18.6 MALIGNANT HYPERTENSION WITH HEART FAILURE AND END-STAGE RENAL DIS: Chronic | ICD-10-CM

## 2017-10-25 DIAGNOSIS — Z99.2 ESRD ON HEMODIALYSIS: Chronic | ICD-10-CM

## 2017-10-25 PROCEDURE — 99999 PR PBB SHADOW E&M-EST. PATIENT-LVL III: CPT | Mod: PBBFAC,,, | Performed by: NURSE PRACTITIONER

## 2017-10-25 PROCEDURE — 99213 OFFICE O/P EST LOW 20 MIN: CPT | Mod: PBBFAC | Performed by: NURSE PRACTITIONER

## 2017-10-25 PROCEDURE — 99214 OFFICE O/P EST MOD 30 MIN: CPT | Mod: S$PBB,,, | Performed by: NURSE PRACTITIONER

## 2017-10-25 RX ORDER — CINACALCET 60 MG/1
60 TABLET, FILM COATED ORAL
Qty: 30 TABLET | Refills: 3 | Status: ON HOLD | OUTPATIENT
Start: 2017-10-25 | End: 2018-04-04

## 2017-10-25 RX ORDER — AMLODIPINE BESYLATE 10 MG/1
10 TABLET ORAL EVERY MORNING
Qty: 90 TABLET | Refills: 4 | Status: ON HOLD | OUTPATIENT
Start: 2017-10-25 | End: 2018-04-04

## 2017-10-25 RX ORDER — ATORVASTATIN CALCIUM 80 MG/1
80 TABLET, FILM COATED ORAL DAILY
Qty: 30 TABLET | Refills: 2 | Status: ON HOLD | OUTPATIENT
Start: 2017-10-25 | End: 2018-04-04

## 2017-10-25 RX ORDER — CHLORPROMAZINE HYDROCHLORIDE 25 MG/1
25 TABLET, FILM COATED ORAL 3 TIMES DAILY
Qty: 90 TABLET | Refills: 11 | Status: ON HOLD | OUTPATIENT
Start: 2017-10-25 | End: 2018-04-04

## 2017-10-25 RX ORDER — HYDRALAZINE HYDROCHLORIDE 50 MG/1
50 TABLET, FILM COATED ORAL EVERY 8 HOURS PRN
Qty: 90 TABLET | Refills: 4 | Status: ON HOLD | OUTPATIENT
Start: 2017-10-25 | End: 2018-04-04

## 2017-10-25 RX ORDER — LISINOPRIL 40 MG/1
40 TABLET ORAL NIGHTLY
Qty: 90 TABLET | Refills: 4 | Status: ON HOLD | OUTPATIENT
Start: 2017-10-25 | End: 2018-08-24 | Stop reason: HOSPADM

## 2017-10-25 RX ORDER — CARVEDILOL 25 MG/1
25 TABLET ORAL 2 TIMES DAILY WITH MEALS
Qty: 180 TABLET | Refills: 4 | Status: ON HOLD | OUTPATIENT
Start: 2017-10-25 | End: 2018-04-04

## 2017-10-25 RX ORDER — OMEPRAZOLE 40 MG/1
40 CAPSULE, DELAYED RELEASE ORAL
Qty: 90 CAPSULE | Refills: 3 | Status: ON HOLD | OUTPATIENT
Start: 2017-10-25 | End: 2018-03-08 | Stop reason: HOSPADM

## 2017-10-25 NOTE — PROGRESS NOTES
Internal Medicine Annual Exam       CHIEF COMPLAINT     The patient, Vaughn Retana, who is a 52 y.o. male presents for an annual exam.    HPI   Pt to establish with Dr. Landeros upon her return.     CVA in July 2017 - hemorrhagic; h/o noncompliance with antihypertensive medications.     HTN- complaint with lisinopril, hydralazine, norvasc     CKD stage V/ESRD- on HD MWF fistula to the Right Arm.   Compliant with cinacalcet.   Followed by Nephrology    DM- last A1c 434 (9/2017) fasting glucose 165 (9/2017).   Left AKA from complications of DM ulceration     CHF- diastolic; see echo results 2/2017. Compliant with ace-I and coreg and lipitor.     HLD- at goal 9/2017. Cont lipitor     Hiccups- intractable. Used thorazine in the past.       Past Medical History:  Past Medical History:   Diagnosis Date    Amputation stump pain 9/10/2013    Aspiration pneumonia 7/27/2015    Asterixis 11/8/2016    C. difficile colitis 8/7/2015    Cholelithiasis without obstruction 8/25/2015    Chronic diastolic heart failure     2-23-17   1 - Low normal to mildly depressed left ventricular systolic function (EF 50-55%).    2 - Right ventricular enlargement with mildly depressed systolic function.    3 - Left ventricular diastolic dysfunction.    4 - Right atrial enlargement.    5 - Severe tricuspid regurgitation.    6 - Pulmonary hypertension. The estimated PA systolic pressure is 86 mmHg.    7 - Increased central venous pressure.     Chronic low back pain 12/1/2015    Closed head injury 9/8/2016    Diabetic neuropathy     ESRD on hemodialysis 2/7/2013    MWF at [facility]    HCV antibody positive     Normal LFT as of 3/2017    Hemiparesis affecting left side as late effect of stroke 11/08/2016    History of Intracerebral Hemorrhage: L BG 5/2013; R BG 9/2016; R BG 11/2016; L caudate head 2/2017 11/2/2016    Hypertension     left basal ganglia ICH 5/2013 11/2/2016    Left Caudate Head ICH 2/22/2017 2/24/2017    Malignant  hypertension with heart failure and ESRD 8/1/2015    Metabolic acidosis, IAG, reduced excretion of inorganic acids     Myoclonic jerking 9/20/2016    Secondary hyperparathyroidism (of renal origin)     Secondary pulmonary hypertension 3/23/2017    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    TB lung, latent 08/25/2015    Negative Quantiferon Gold 3-23-17       Past Surgical History:   Procedure Laterality Date    COLONOSCOPY      COLONOSCOPY N/A 4/4/2017    Procedure: COLONOSCOPY;  Surgeon: Walker Stern MD;  Location: [facility] ([address] [place] [address]);  Service: Endoscopy;  Laterality: N/A;  PA Systolic Pressure 85.56. HD Patient MWF, K+ lab prior to procedure.     FOOT AMPUTATION THROUGH METATARSAL      left foot    LEG AMPUTATION THROUGH KNEE  12/18/2013    left BKA    R AVF  9/12/12        Family History   Problem Relation Age of Onset    Early death Mother     Kidney disease Father     Hypertension Father     Heart disease Father     Hyperlipidemia Father     Diabetes Brother     Alcohol abuse Maternal Grandmother     Diabetes Maternal Grandfather     Melanoma Neg Hx         Social History     Social History    Marital status:      Spouse name: N/A    Number of children: N/A    Years of education: N/A     Occupational History    Not on file.     Social History Main Topics    Smoking status: Former Smoker     Packs/day: 1.00     Years: 10.00    Smokeless tobacco: Never Used    Alcohol use No    Drug use: No    Sexual activity: Not on file     Other Topics Concern    Not on file     Social History Narrative    No narrative on file        History   Smoking Status    Former Smoker    Packs/day: 1.00    Years: 10.00   Smokeless Tobacco    Never Used        Allergies as of 10/25/2017 - Reviewed 10/25/2017   Allergen Reaction Noted    Fosrenol [lanthanum] Nausea And Vomiting 03/23/2017          Home Medications:  Prior to Admission medications    Medication Sig Start Date End Date  Taking? Authorizing Provider   amlodipine (NORVASC) 10 MG tablet Take 1 tablet (10 mg total) by mouth every morning. 3/23/17  Yes Zay Dueñas MD   atorvastatin (LIPITOR) 80 MG tablet Take 1 tablet (80 mg total) by mouth once daily. 10/20/17  Yes Gilles Sparks MD   carvedilol (COREG) 25 MG tablet Take 1 tablet (25 mg total) by mouth 2 (two) times daily with meals. 3/23/17  Yes Zay Dueñas MD   cinacalcet (SENSIPAR) 60 MG Tab Take 1 tablet (60 mg total) by mouth daily with breakfast. 9/29/16  Yes Cyn Edwards MD   hydrALAZINE (APRESOLINE) 50 MG tablet Take 1 tablet (50 mg total) by mouth every 8 (eight) hours as needed (systolic blood pressure (top number) > 180). 3/23/17 3/23/18 Yes Zay Dueñas MD   lisinopril (PRINIVIL,ZESTRIL) 40 MG tablet Take 1 tablet (40 mg total) by mouth every evening. 3/23/17  Yes Zay Dueñas MD   omeprazole (PRILOSEC) 40 MG capsule Take 1 capsule (40 mg total) by mouth 2 (two) times daily before meals. 6/24/17  Yes Phoenix Carmichael PA-C   ondansetron (ZOFRAN) 8 MG tablet Take 1 tablet (8 mg total) by mouth every 8 (eight) hours as needed for Nausea. 6/24/17  Yes Phoenix Carmichael PA-C       Review of Systems:  Review of Systems   Constitutional: Negative for chills, diaphoresis, fatigue, fever and unexpected weight change.   HENT: Negative for congestion, postnasal drip, rhinorrhea, sinus pain, sinus pressure, sneezing, sore throat, tinnitus and trouble swallowing.    Eyes: Negative for pain, itching and visual disturbance.   Respiratory: Negative for apnea, cough, choking, chest tightness, shortness of breath and wheezing.    Cardiovascular: Negative for chest pain, palpitations and leg swelling.   Gastrointestinal: Negative for abdominal distention, abdominal pain, blood in stool, constipation and diarrhea.   Endocrine: Positive for polyphagia. Negative for polydipsia and polyuria.   Genitourinary: Negative for difficulty urinating.   Musculoskeletal: Negative for arthralgias and  "myalgias.   Skin: Negative for rash and wound.   Neurological: Negative for dizziness, syncope, light-headedness and headaches.   Psychiatric/Behavioral: Negative for confusion, dysphoric mood and sleep disturbance. The patient is not nervous/anxious.      Health Maintainence:   Immunizations:  Health Maintenance       Date Due Completion Date    Eye Exam 06/13/1974 ---    TETANUS VACCINE 06/13/1982 ---    Pneumococcal PPSV23 (Medium Risk) (1) 06/13/1982 ---    Influenza Vaccine 08/01/2017 ---    Hemoglobin A1c 03/09/2018 9/9/2017    Foot Exam 06/08/2018 6/8/2017    Lipid Panel 09/09/2018 9/9/2017    Colonoscopy 04/04/2027 4/4/2017           PHYSICAL EXAM     /84 (BP Location: Left arm, Patient Position: Sitting, BP Method: Large (Manual))   Pulse 86   Ht 5' 6" (1.676 m)   Wt 72.9 kg (160 lb 11.5 oz)   SpO2 (!) 93%   BMI 25.94 kg/m²  Body mass index is 25.94 kg/m².    Physical Exam    LABS     Lab Results   Component Value Date    HGBA1C 4.4 09/09/2017     CMP  Sodium   Date Value Ref Range Status   09/09/2017 138 136 - 145 mmol/L Final     Potassium   Date Value Ref Range Status   09/09/2017 3.9 3.5 - 5.1 mmol/L Final     Chloride   Date Value Ref Range Status   09/09/2017 95 95 - 110 mmol/L Final     CO2   Date Value Ref Range Status   09/09/2017 27 23 - 29 mmol/L Final     Glucose   Date Value Ref Range Status   09/09/2017 165 (H) 70 - 110 mg/dL Final     BUN, Bld   Date Value Ref Range Status   09/09/2017 32 (H) 6 - 20 mg/dL Final     Creatinine   Date Value Ref Range Status   09/09/2017 6.7 (H) 0.5 - 1.4 mg/dL Final     Calcium   Date Value Ref Range Status   09/09/2017 10.0 8.7 - 10.5 mg/dL Final     Total Protein   Date Value Ref Range Status   09/09/2017 8.5 (H) 6.0 - 8.4 g/dL Final     Albumin   Date Value Ref Range Status   09/09/2017 3.3 (L) 3.5 - 5.2 g/dL Final     Total Bilirubin   Date Value Ref Range Status   09/09/2017 0.5 0.1 - 1.0 mg/dL Final     Comment:     For infants and newborns, " interpretation of results should be based  on gestational age, weight and in agreement with clinical  observations.  Premature Infant recommended reference ranges:  Up to 24 hours.............<8.0 mg/dL  Up to 48 hours............<12.0 mg/dL  3-5 days..................<15.0 mg/dL  6-29 days.................<15.0 mg/dL       Alkaline Phosphatase   Date Value Ref Range Status   09/09/2017 245 (H) 55 - 135 U/L Final     AST   Date Value Ref Range Status   09/09/2017 22 10 - 40 U/L Final     ALT   Date Value Ref Range Status   09/09/2017 14 10 - 44 U/L Final     Anion Gap   Date Value Ref Range Status   09/09/2017 16 8 - 16 mmol/L Final     eGFR if    Date Value Ref Range Status   09/09/2017 10 (A) >60 mL/min/1.73 m^2 Final     eGFR if non    Date Value Ref Range Status   09/09/2017 9 (A) >60 mL/min/1.73 m^2 Final     Comment:     Calculation used to obtain the estimated glomerular filtration  rate (eGFR) is the CKD-EPI equation. Since race is unknown   in our information system, the eGFR values for   -American and Non--American patients are given   for each creatinine result.       Lab Results   Component Value Date    WBC 12.95 (H) 07/28/2017    HGB 9.5 (L) 07/28/2017    HCT 28.3 (L) 07/28/2017    MCV 94 07/28/2017     07/28/2017     Lab Results   Component Value Date    CHOL 161 09/09/2017    CHOL 195 07/24/2017    CHOL 128 11/02/2016     Lab Results   Component Value Date    HDL 50 09/09/2017    HDL 48 07/24/2017    HDL 33 (L) 11/02/2016     Lab Results   Component Value Date    LDLCALC 89.0 09/09/2017    LDLCALC 123.8 07/24/2017    LDLCALC 73.2 11/02/2016     Lab Results   Component Value Date    TRIG 110 09/09/2017    TRIG 116 07/24/2017    TRIG 109 11/02/2016     Lab Results   Component Value Date    CHOLHDL 31.1 09/09/2017    CHOLHDL 24.6 07/24/2017    CHOLHDL 25.8 11/02/2016     Lab Results   Component Value Date    TSH 1.273 07/24/2017       ASSESSMENT/PLAN      Vaughn Retana is a 52 y.o. male with  Past Medical History:   Diagnosis Date    Amputation stump pain 9/10/2013    Aspiration pneumonia 7/27/2015    Asterixis 11/8/2016    C. difficile colitis 8/7/2015    Cholelithiasis without obstruction 8/25/2015    Chronic diastolic heart failure     2-23-17   1 - Low normal to mildly depressed left ventricular systolic function (EF 50-55%).    2 - Right ventricular enlargement with mildly depressed systolic function.    3 - Left ventricular diastolic dysfunction.    4 - Right atrial enlargement.    5 - Severe tricuspid regurgitation.    6 - Pulmonary hypertension. The estimated PA systolic pressure is 86 mmHg.    7 - Increased central venous pressure.    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    Metabolic acidosis, IAG, reduced excretion of inorganic acids     Myoclonic jerking 9/20/2016    Secondary hyperparathyroidism (of renal origin)     Secondary pulmonary hypertension 3/23/2017    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    TB lung, latent 08/25/2015    Negative Quantiferon Gold 3-23-17       History of Intracerebral Hemorrhage: L BG 5/2013; R BG 9/2016; R BG 11/2016; L caudate head 2/2017/Nontraumatic intracerebral hemorrhage, unspecified cerebral location, unspecified laterality- Cont lipitor. BP controlled. No residuals.     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- BP stable. Cont current meds.   -     carvedilol  (COREG) 25 MG tablet; Take 1 tablet (25 mg total) by mouth 2 (two) times daily with meals.  Dispense: 180 tablet; Refill: 4  -     amLODIPine (NORVASC) 10 MG tablet; Take 1 tablet (10 mg total) by mouth every morning.  Dispense: 90 tablet; Refill: 4  -     hydrALAZINE (APRESOLINE) 50 MG tablet; Take 1 tablet (50 mg total) by mouth every 8 (eight) hours as needed (systolic blood pressure (top number) > 180).  Dispense: 90 tablet; Refill: 4  -     lisinopril (PRINIVIL,ZESTRIL) 40 MG tablet; Take 1 tablet (40 mg total) by mouth every evening.  Dispense: 90 tablet; Refill: 4    Dyslipidemia- LDL at goal at last check. Low cholesterol diet.   -     atorvastatin (LIPITOR) 80 MG tablet; Take 1 tablet (80 mg total) by mouth once daily.  Dispense: 30 tablet; Refill: 2    Chronic diastolic heart failure- echo reviewed. 2/2017. Weight stable, pt on HD. Low na deit and fluid restriction. Cont ace-I, lipitor, coreg.     ESRD (end stage renal disease) on dialysis/ESRD on hemodialysis  -     cinacalcet (SENSIPAR) 60 MG Tab; Take 1 tablet (60 mg total) by mouth daily with breakfast.  Dispense: 30 tablet; Refill: 3      Type 2 diabetes mellitus with chronic kidney disease on chronic dialysis, without long-term current use of insulin- A1c at goal. Fasting blodd sugar elevated. discussed need to check CBG readings. Low sugar and carb diet.     Secondary hyperparathyroidism of renal origin- cont sensipar.     Nausea and vomiting, intractability of vomiting not specified, unspecified vomiting type  -     omeprazole (PRILOSEC) 40 MG capsule; Take 1 capsule (40 mg total) by mouth 2 (two) times daily before meals.  Dispense: 90 capsule; Refill: 3    Intractable hiccups  -     chlorproMAZINE (THORAZINE) 25 MG tablet; Take 1 tablet (25 mg total) by mouth 3 (three) times daily.  Dispense: 90 tablet; Refill: 11          Follow up with PCP in [facility] as scheduled     Yvette HERNANDEZ, APRN, FNP-c   Department of Internal Medicine -  [facility]  2:26 PM

## 2017-12-14 ENCOUNTER — OFFICE VISIT (OUTPATIENT)
Dept: PODIATRY | Facility: CLINIC | Age: 52
End: 2017-12-14
Payer: MEDICARE

## 2017-12-14 VITALS
RESPIRATION RATE: 18 BRPM | SYSTOLIC BLOOD PRESSURE: 160 MMHG | HEART RATE: 85 BPM | BODY MASS INDEX: 25.88 KG/M2 | DIASTOLIC BLOOD PRESSURE: 90 MMHG | WEIGHT: 161 LBS | HEIGHT: 66 IN

## 2017-12-14 DIAGNOSIS — L84 CORN OR CALLUS: ICD-10-CM

## 2017-12-14 DIAGNOSIS — Z99.2 ESRD ON HEMODIALYSIS: Primary | ICD-10-CM

## 2017-12-14 DIAGNOSIS — B35.1 ONYCHOMYCOSIS DUE TO DERMATOPHYTE: ICD-10-CM

## 2017-12-14 DIAGNOSIS — E11.51 PERIPHERAL VASCULAR DISEASE IN DIABETES MELLITUS: ICD-10-CM

## 2017-12-14 DIAGNOSIS — N18.6 ESRD ON HEMODIALYSIS: Primary | ICD-10-CM

## 2017-12-14 DIAGNOSIS — Z89.512 S/P BKA (BELOW KNEE AMPUTATION) UNILATERAL, LEFT: ICD-10-CM

## 2017-12-14 PROCEDURE — 11056 PARNG/CUTG B9 HYPRKR LES 2-4: CPT | Mod: Q7,S$PBB,, | Performed by: PODIATRIST

## 2017-12-14 PROCEDURE — 99499 UNLISTED E&M SERVICE: CPT | Mod: S$PBB,,, | Performed by: PODIATRIST

## 2017-12-14 PROCEDURE — 11720 DEBRIDE NAIL 1-5: CPT | Mod: 59,Q7,S$PBB, | Performed by: PODIATRIST

## 2017-12-14 PROCEDURE — 99999 PR PBB SHADOW E&M-EST. PATIENT-LVL III: CPT | Mod: PBBFAC,,, | Performed by: PODIATRIST

## 2017-12-14 PROCEDURE — 11056 PARNG/CUTG B9 HYPRKR LES 2-4: CPT | Mod: Q7,PBBFAC | Performed by: PODIATRIST

## 2017-12-14 PROCEDURE — 99213 OFFICE O/P EST LOW 20 MIN: CPT | Mod: PBBFAC | Performed by: PODIATRIST

## 2017-12-14 PROCEDURE — 11720 DEBRIDE NAIL 1-5: CPT | Mod: 59,Q7,PBBFAC | Performed by: PODIATRIST

## 2017-12-14 RX ORDER — FAMOTIDINE 40 MG/1
40 TABLET, FILM COATED ORAL DAILY
Refills: 1 | Status: ON HOLD | COMMUNITY
Start: 2017-11-19 | End: 2018-03-08 | Stop reason: HOSPADM

## 2017-12-15 NOTE — PROGRESS NOTES
Subjective:      Patient ID: Vaughn Retana is a 52 y.o. male.    Chief Complaint: PCP (Yvette Zelaya NP  10/25/17); Diabetic Foot Exam; Nail Care; and Callouses    Vaughn is a 52 y.o. male who presents to the clinic for evaluation and treatment of high risk feet. Vaughn has a past medical history of Amputation stump pain (9/10/2013); Aspiration pneumonia (7/27/2015); Asterixis (11/8/2016); C. 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 The patient's chief complaint is long, thick toenails. This patient has documented high risk feet requiring routine maintenance secondary to peripheral vascular disease.    PCP: Primary Doctor No    Date Last Seen by PCP:   Chief Complaint   Patient presents with    PCP     Yvette Zelaya NP  10/25/17    Diabetic Foot Exam    Nail Care    Callouses         Hemoglobin A1C   Date Value Ref Range Status   09/09/2017 4.4 4.0 - 5.6 % Final     Comment:     According to ADA guidelines, hemoglobin A1c <7.0% represents  optimal control in non-pregnant diabetic patients. Different  metrics may apply to specific patient populations.   Standards of  Medical Care in Diabetes-2016.  For the purpose of screening for the presence of diabetes:  <5.7%     Consistent with the absence of diabetes  5.7-6.4%  Consistent with increasing risk for diabetes   (prediabetes)  >or=6.5%  Consistent with diabetes  Currently, no consensus exists for use of hemoglobin A1c  for diagnosis of diabetes for children.  This Hemoglobin A1c assay has significant interference with fetal   hemoglobin   (HbF). The results are invalid for patients with abnormal amounts of   HbF,   including those with known Hereditary Persistence   of Fetal Hemoglobin. Heterozygous hemoglobin variants (HbAS, HbAC,   HbAD, HbAE, HbA2) do not significantly interfere with this assay;   however, presence of multiple variants in a sample may impact the %   interference.     07/24/2017 5.0 4.0 - 5.6 % Final     Comment:     According to ADA guidelines, hemoglobin A1c <7.0% represents  optimal control in non-pregnant diabetic patients. Different  metrics may apply to specific patient populations.   Standards of Medical Care in Diabetes-2016.  For the purpose of screening for the presence of diabetes:  <5.7%     Consistent with the absence of diabetes  5.7-6.4%  Consistent with increasing risk for diabetes   (prediabetes)  >or=6.5%  Consistent with diabetes  Currently, no consensus exists for use of hemoglobin A1c  for diagnosis of diabetes for children.  This Hemoglobin A1c assay has significant interference with fetal   hemoglobin   (HbF). The results are invalid for patients with abnormal amounts of   HbF,   including those with known Hereditary Persistence   of Fetal Hemoglobin. Heterozygous hemoglobin variants (HbAS, HbAC,   HbAD, HbAE, HbA2) do not significantly interfere with this assay;   however, presence of multiple variants in a sample may impact the %   interference.     06/24/2017 5.1 4.0 - 5.6 % Final     Comment:     According to ADA guidelines, hemoglobin A1c <7.0% represents  optimal control in  non-pregnant diabetic patients. Different  metrics may apply to specific patient populations.   Standards of Medical Care in Diabetes-2016.  For the purpose of screening for the presence of diabetes:  <5.7%     Consistent with the absence of diabetes  5.7-6.4%  Consistent with increasing risk for diabetes   (prediabetes)  >or=6.5%  Consistent with diabetes  Currently, no consensus exists for use of hemoglobin A1c  for diagnosis of diabetes for children.  This Hemoglobin A1c assay has significant interference with fetal   hemoglobin   (HbF). The results are invalid for patients with abnormal amounts of   HbF,   including those with known Hereditary Persistence   of Fetal Hemoglobin. Heterozygous hemoglobin variants (HbAS, HbAC,   HbAD, HbAE, HbA2) do not significantly interfere with this assay;   however, presence of multiple variants in a sample may impact the %   interference.         Review of Systems   Constitution: Negative for chills, decreased appetite and fever.   Cardiovascular: Negative for leg swelling.   Respiratory: Negative for cough.    Skin: Positive for dry skin and nail changes.   Musculoskeletal: Positive for muscle weakness (s/p CVA). Negative for arthritis, joint pain, joint swelling and myalgias.   Gastrointestinal: Negative for nausea and vomiting.   Neurological: Positive for paresthesias. Negative for loss of balance.           Objective:      Physical Exam   Constitutional: He is oriented to person, place, and time. He appears well-developed and well-nourished.   Cardiovascular:   Dorsalis pedis and posterior tibial pulses are diminished R       Musculoskeletal: Normal range of motion. He exhibits no edema or tenderness.   Adequate joint range of motion without pain, limitation, nor crepitation R joints. Muscle strength is 5/5 in all groups R    S/p L BKA     Neurological: He is alert and oriented to person, place, and time.   [place] 5.07 monofilamant testing is diminished [place]kin  feet. Sharp/dull sensation diminished Bilaterally. Light touch absent Bilaterally.       Skin: Skin is warm and dry. No ecchymosis and no lesion noted. No erythema.   Nails x5 are elongated by  1-6mm's, thickened by 1-6 mm's, dystrophic, and are darkened in  coloration . Xerosis Bilaterally. No open lesions noted.    Hyperkeratotic tissue noted to medial HIPJ R, distal 2nd toe      Psychiatric: He has a normal mood and affect. His behavior is normal.   Nursing note and vitals reviewed.            Assessment:       Encounter Diagnoses   Name Primary?    ESRD on hemodialysis Yes    Peripheral vascular disease in diabetes mellitus     Onychomycosis due to dermatophyte     Corn or callus     S/P BKA (below knee amputation) unilateral, left          Plan:       Vaughn was seen today for pcp, diabetic foot exam, nail care and callouses.    Diagnoses and all orders for this visit:    ESRD on hemodialysis    Peripheral vascular disease in diabetes mellitus    Onychomycosis due to dermatophyte    Corn or callus    S/P BKA (below knee amputation) unilateral, left      I counseled the patient on his conditions, their implications and medical management.        - Shoe inspection. Diabetic Foot Education. Patient reminded of the importance of good nutrition and blood sugar control to help prevent podiatric complications of diabetes. Patient instructed on proper foot hygeine. We discussed wearing proper shoe gear, daily foot inspections, never walking without protective shoe gear, never putting sharp instruments to feet, routine podiatric nail visits every 2-3 months.      - With patient's permission, nails were aggressively reduced and debrided x 5 to their soft tissue attachment mechanically and with electric , removing all offending nail and debris. Patient relates relief following the procedure. He will continue to monitor the areas daily, inspect his feet, wear protective shoe gear when ambulatory, moisturizer to  maintain skin integrity and follow in this office in approximately 2-3 months, sooner p.r.n.    - After cleansing the  area w/ alcohol prep pad the above mentioned hyperkeratosis was trimmed utilizing No 15 scapel, to a smooth base with out incident. Patient tolerated this  well and reported comfort to the area of R great toe , distal 2nd toe

## 2018-02-22 ENCOUNTER — HOSPITAL ENCOUNTER (OUTPATIENT)
Facility: HOSPITAL | Age: 53
Discharge: HOME | End: 2018-02-22
Attending: INTERNAL MEDICINE | Admitting: INTERNAL MEDICINE
Payer: MEDICARE

## 2018-02-22 VITALS
TEMPERATURE: 97 F | BODY MASS INDEX: 27.97 KG/M2 | OXYGEN SATURATION: 100 % | WEIGHT: 174 LBS | HEIGHT: 66 IN | DIASTOLIC BLOOD PRESSURE: 82 MMHG | SYSTOLIC BLOOD PRESSURE: 143 MMHG | HEART RATE: 72 BPM | RESPIRATION RATE: 18 BRPM

## 2018-02-22 DIAGNOSIS — N18.6 ESRD ON HEMODIALYSIS: Chronic | ICD-10-CM

## 2018-02-22 DIAGNOSIS — T82.590D MALFUNCTION OF ARTERIOVENOUS DIALYSIS FISTULA, SUBSEQUENT ENCOUNTER: Primary | ICD-10-CM

## 2018-02-22 DIAGNOSIS — T82.858D STENOSIS OF ARTERIOVENOUS DIALYSIS FISTULA, SUBSEQUENT ENCOUNTER: ICD-10-CM

## 2018-02-22 DIAGNOSIS — Z99.2 ESRD ON HEMODIALYSIS: Chronic | ICD-10-CM

## 2018-02-22 LAB — PERIPHERAL PTA: YES

## 2018-02-22 PROCEDURE — C1769 GUIDE WIRE: HCPCS

## 2018-02-22 PROCEDURE — 36902 INTRO CATH DIALYSIS CIRCUIT: CPT | Mod: ,,, | Performed by: INTERNAL MEDICINE

## 2018-02-22 PROCEDURE — 99152 MOD SED SAME PHYS/QHP 5/>YRS: CPT

## 2018-02-22 PROCEDURE — 99152 MOD SED SAME PHYS/QHP 5/>YRS: CPT | Mod: ,,, | Performed by: INTERNAL MEDICINE

## 2018-02-22 PROCEDURE — 25000003 PHARM REV CODE 250

## 2018-02-22 PROCEDURE — C2623 CATH, TRANSLUMIN, DRUG-COAT: HCPCS

## 2018-02-22 PROCEDURE — 63600175 PHARM REV CODE 636 W HCPCS

## 2018-02-22 NOTE — DISCHARGE SUMMARY
[facility]  Discharge Note  Short Stay    Admit Date: 2/22/2018    Discharge Date and Time: No discharge date for patient encounter.     Attending Physician: Grayson Hubbard MD     Discharge Provider: Grayson Hubbard    Diagnoses:PTA of the cephalic vein edge stent stenosis with 10 mm x 4 cm dorado ballon and 10 mm x 6 cm DCB lutonix baloon.  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       Discharged Condition: good    Hospital Course: Patient was admitted for an outpatient procedure and tolerated the procedure well with no complications.    Final Diagnoses: Same as principal problem.    Disposition: Home or Self Care    Follow up/Patient Instructions:    Medications:  Reconciled Home Medications:   Current Discharge Medication List      CONTINUE these medications which have NOT CHANGED    Details   amLODIPine (NORVASC) 10 MG tablet Take 1 tablet (10 mg total) by mouth every morning.  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  Qty: 30 tablet, Refills: 2    Associated Diagnoses: Dyslipidemia      carvedilol (COREG) 25 MG tablet Take 1 tablet (25 mg total) by mouth 2 (two) times daily with meals.  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  Qty: 90 tablet, Refills: 11    Associated Diagnoses: Intractable hiccups      cinacalcet (SENSIPAR) 60 MG Tab Take 1 tablet (60 mg total) by mouth daily with breakfast.  Qty: 30 tablet, Refills: 3    Associated Diagnoses: ESRD (end stage renal disease) on dialysis       famotidine (PEPCID) 40 MG tablet Take 40 mg by mouth once daily.  Refills: 1      hydrALAZINE (APRESOLINE) 50 MG tablet Take 1 tablet (50 mg total) by mouth every 8 (eight) hours as needed (systolic blood pressure (top number) > 180).  Qty: 90 tablet, Refills: 4    Associated Diagnoses: Malignant hypertension with heart failure and end-stage renal dis      lisinopril (PRINIVIL,ZESTRIL) 40 MG tablet Take 1 tablet (40 mg total) by mouth every evening.  Qty: 90 tablet, Refills: 4    Associated Diagnoses: Malignant hypertension with heart failure and end-stage renal dis      omeprazole (PRILOSEC) 40 MG capsule Take 1 capsule (40 mg total) by mouth 2 (two) times daily before meals.  Qty: 90 capsule, Refills: 3    Associated Diagnoses: Nausea and vomiting, intractability of vomiting not specified, unspecified vomiting type      ondansetron (ZOFRAN) 8 MG tablet Take 1 tablet (8 mg total) by mouth every 8 (eight) hours as needed for Nausea.  Qty: 90 tablet, Refills: 4    Associated Diagnoses: Hiccups           No discharge procedures on file.      Discharge Procedure Orders; Resume previous diet and activity, Follow up as needed.

## 2018-02-22 NOTE — PLAN OF CARE
Problem: Hemodialysis (Adult)  Goal: Signs and Symptoms of Listed Potential Problems Will be Absent, Minimized or Managed (Hemodialysis)  Signs and symptoms of listed potential problems will be absent, minimized or managed by discharge/transition of care (reference Hemodialysis (Adult) CPG).  Outcome: Ongoing (interventions implemented as appropriate)  Admit assessment done. Plan of care and safety prec initiated. Will continue to monitor.

## 2018-02-22 NOTE — BRIEF OP NOTE
[facility]  Brief Operative Note     SUMMARY     Surgery Date: 2/22/2018     Surgeon(s) and Role:     * Grayson Hubbard MD - Primary    Assisting Surgeon: None    Pre-op Diagnosis:  Malfunction of arteriovenous dialysis fistula, subsequent encounter [T82.771D]    Post-op Diagnosis:  PTA of the cephalic vein edge stent stenosis with 10 mm x 4 cm dorado ballon and 10 mm x 6 cm DCB lutonix baloon.  Procedure(s) (LRB):  FISTULOGRAM (N/A)    Anesthesia: RN IV Sedation  Estimated Blood Loss: 5 ml.         Specimens:   Specimen (12h ago through future)    None

## 2018-02-22 NOTE — PLAN OF CARE
Patient discharged per MD orders. Instructions given on medications, wound care, activity, signs of infection, when to call MD, and follow up appointments. Pt verbalized understanding. PIV removed. Patient and family refused transport, ambulated off unit.

## 2018-02-22 NOTE — H&P
[facility]  History & Physical    Subjective:      Chief Complaint/Reason for Admission: High venous pressures on HD    Vaughn Retana is a 52 y.o. male with right BC AVF presents here with  High venous pressures on HD and bleeding after HD session.      Past Medical History:   Diagnosis Date    Amputation stump pain 9/10/2013    Aspiration pneumonia 7/27/2015    Asterixis 11/8/2016    C. difficile colitis 8/7/2015    Cholelithiasis without obstruction 8/25/2015    Chronic diastolic heart failure     2-23-17   1 - Low normal to mildly depressed left ventricular systolic function (EF 50-55%).    2 - Right ventricular enlargement with mildly depressed systolic function.    3 - Left ventricular diastolic dysfunction.    4 - Right atrial enlargement.    5 - Severe tricuspid regurgitation.    6 - Pulmonary hypertension. The estimated PA systolic pressure is 86 mmHg.    7 - Increased central venous pressure.     Chronic low back pain 12/1/2015    Closed head injury 9/8/2016    ESRD on hemodialysis 2/7/2013    MWF at [facility]   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    Metabolic acidosis, IAG, reduced excretion of inorganic acids     Myoclonic jerking 9/20/2016    Secondary hyperparathyroidism (of renal origin)     Secondary pulmonary hypertension 3/23/2017    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    TB lung, latent 08/25/2015    Negative Quantiferon Gold 3-23-17     Past Surgical History:   Procedure Laterality Date    COLONOSCOPY      COLONOSCOPY N/A 4/4/2017    Procedure: COLONOSCOPY;  Surgeon: Walker  MD Jarred;  Location: [facility] ([address] [place] [address]);  Service: Endoscopy;  Laterality: N/A;  PA Systolic Pressure 85.56. HD Patient MWF, K+ lab prior to procedure.     FOOT AMPUTATION THROUGH METATARSAL      left foot    LEG AMPUTATION THROUGH KNEE  12/18/2013    left BKA    R AVF  9/12/12     Family History   Problem Relation Age of Onset    Early death Mother     Kidney disease Father     Hypertension Father     Heart disease Father     Hyperlipidemia Father     Diabetes Brother     Alcohol abuse Maternal Grandmother     Diabetes Maternal Grandfather     Hypertension Sister     Melanoma Neg Hx      Social History   Substance Use Topics    Smoking status: Former Smoker     Packs/day: 1.00     Years: 10.00    Smokeless tobacco: Never Used    Alcohol use No       PTA Medications   Medication Sig    amLODIPine (NORVASC) 10 MG tablet Take 1 tablet (10 mg total) by mouth every morning.    atorvastatin (LIPITOR) 80 MG tablet Take 1 tablet (80 mg total) by mouth once daily.    carvedilol (COREG) 25 MG tablet Take 1 tablet (25 mg total) by mouth 2 (two) times daily with meals.    chlorproMAZINE (THORAZINE) 25 MG tablet Take 1 tablet (25 mg total) by mouth 3 (three) times daily.    cinacalcet (SENSIPAR) 60 MG Tab Take 1 tablet (60 mg total) by mouth daily with breakfast.    famotidine (PEPCID) 40 MG tablet Take 40 mg by mouth once daily.    hydrALAZINE (APRESOLINE) 50 MG tablet Take 1 tablet (50 mg total) by mouth every 8 (eight) hours as needed (systolic blood pressure (top number) > 180).    lisinopril (PRINIVIL,ZESTRIL) 40 MG tablet Take 1 tablet (40 mg total) by mouth every evening.    omeprazole (PRILOSEC) 40 MG capsule Take 1 capsule (40 mg total) by mouth 2 (two) times daily before meals.    ondansetron (ZOFRAN) 8 MG tablet Take 1 tablet (8 mg total) by mouth every 8 (eight) hours as needed for Nausea.     Review of patient's allergies indicates:   Allergen Reactions    Fosrenol [lanthanum] Nausea And  Vomiting     Nausea and vomiting        ROS Constitutional: No fever or chills, no weight changes.  Eyes: No visual changes or photophobia  HEENT: No nasal congestion or sore throat  Respiratory: No cough or shortness of breath  Cardiovascular: No chest pain or palpitations  Gastrointestinal: Good appetite, no nausea or vomiting, no change in bowel habits  Genitourinary: No hematuria or dysuria  Skin: No rash or pruritis  Hematologic/lymphatic: No easy bruising, bleeding or lymphadenopathy  Musculoskeletal: No arthralgias or myalgias  Neurological: No seizures or tremors  Endocrine: No heat/cold intolerance.  No polyuria/polydipsia.  Psychiatric:  No depression or anxiety.    Objective:      Vital Signs (Most Recent)  Temp: 97.5 °F (36.4 °C) (02/22/18 0837)  Pulse: 74 (02/22/18 0837)  Resp: 18 (02/22/18 0837)  BP: 138/81 (02/22/18 0837)  SpO2: 100 % (02/22/18 0837)    Vital Signs Range (Last 24H):  Temp:  [97.5 °F (36.4 °C)]   Pulse:  [74]   Resp:  [18]   BP: (138)/(81)   SpO2:  [100 %]     Physical Exam  General appearance: Well developed, well nourished  Head: Normocephalic, atraumatic  Eyes:  Conjunctivae nl. Sclera anicteric. PERRL.  HEENT: Lips, mucosa, and tongue normal; teeth and gums normal and oropharynx clear.  Neck: Supple, trachea midline, thyroid not enlarged,   Lungs: Clear to auscultation bilaterally and normal respiratory effort  Heart: Regular rate and rhythm, S1, S2 normal, no murmur, click, rub or gallop  Abdomen: Soft, non-tender non-distended; bowel sounds normal; no masses,  no organomegaly  Extremities: No cyanosis or clubbing. No edema  Pulses: 2+ and symmetric  Skin: Skin color, texture, turgor normal. No rashes or lesions  Lymph nodes: Cervical, supraclavicular, and axillary nodes normal.  Neurologic: Normal strength and tone. No focal numbness or weakness  Psychiatric:  Alert and oriented times 3.  Affect appropriate.  Right BC AVF; WATER HAMMER PULSE, HIGH PITCHED DISCONTNIOUS  MURMUR.    Assessment:      Active Hospital Problems    Diagnosis  POA    Stenosis of arteriovenous dialysis fistula [T82.298N]  Yes      Resolved Hospital Problems    Diagnosis Date Resolved POA   No resolved problems to display.       Plan:    1. Fistulagram with PTA

## 2018-02-22 NOTE — PLAN OF CARE
Received report from ISMAEL Martinez. Patient s/p fostulagram, AAOx3. VSS, no c/o pain or discomfort at this time, resp even and unlabored. Gauze/tegaderm dressing to RICHARD is CDI. No active bleeding. No hematoma noted. Post procedure protocol reviewed with patient and patient's family. Understanding verbalized. Family members at bedside. Nurse call bell within reach. Will continue to monitor per post procedure protocol.

## 2018-02-23 NOTE — OP NOTE
DATE OF PROCEDURE:  02/22/2018    PROCEDURE TYPE:  Fistulogram of right brachiocephalic fistula.    INDICATION:  Prolonged bleeding after dialysis needle withdrawal.    :  Grayson Hubbard M.D.    POSTPROCEDURE DIAGNOSES:  1.  Superior venacavogram.  2.  Subclavian venogram.  3.  Axillary venogram and in-stent stenosis, both the distal and proximal   in-stent stenosis, 60% balloon angioplastied with 10 mm x 4 cm Bourg balloon.    Given the recurrent stenosis followed by Lutonix 10 mm x 6 cm drug-coated   balloon.  Unable to visualize the brachial artery secondary to brisk flow.    PROCEDURE NOTE IN DETAIL:  After informed consent was obtained from Mr. Vaughn Retana was brought to the Access Suite and placed in a supine position.  The   area of his right brachiocephalic was cleaned and draped in usual sterile   fashion.  After achieving local anesthesia with lidocaine and epinephrine,   access was cannulated with a micropuncture needle in antegrade direction then a   mandril wire advance.  A 5-[place] sheath was established.  Multiple angiographic   runs revealed there was patent superior vena cava, subclavian vein, axillary   vein and distal and proximal cephalic arch in-stent stenosis 60%, followed by   rest of the cephalic vein was patent.  Given its significant symptoms and   minimal dilatation, I decided to intervening lesion.  Initially, an angled   Glidewire was advanced under the fluoroscopy guidance into the SVC.  Followed by   that, a 10 mm x 4 cm Bourg balloon was used to angioplasty both proximal and   distal cephalic vein in-stent stenosis.  Post-angioplasty angiogram revealed   excellent results.  Given recurrent stenosis and also the nature of the lesion   in the cephalic arch, I decided to use a drug-coated Lutonix balloon as the   recent literature have shown significant benefit preventing the in-stent   stenosis and candy apple lesions.  In case 10 mm x 6 cm lutonix drug coated  balloon  was brought into   the field and it was balloon angioplastied using distal in-stent stenosis.  The   vessel preparation was done prior to it and then 2 minutes of inflation was   done about protocol and subsequent result showed an excellent flow and on   examination, this aneurysmal dilatation to the cannulation zone were   significantly softened.  In any case while the balloon was inflated, reflux   arteriogram performed, unable to visualize the brachial artery secondary to   brisk flow.  Balloon and wire were removed and it was sutured.  A 7-French   sheath was removed after securing 3-0 Prolene sutures.  No immediate   complications.  Estimated blood loss was 5 mL.  The patient discharged from   Access Suite in a stable condition.  Conscious sedation given by me and RN.    Conscious sedation was started around 9:17 and ended at 09:56 a.m. and he   received total of 1 mg of Versed and 50 mcg of fentanyl.  It was monitored by me   and RN for sedation protocols.  The patient was discharged from Access Suite in   a stable condition.  No immediate complications.  The estimated blood loss was   5 mL.    PLAN:  Per KDOQI recommendations, we will do surveillance and monitoring.  If   there is any abnormality, we will bring him back early.  Otherwise, we will   follow him as needed basis.          /alec [id number] carl(s)          DRK/HN  dd: 02/22/2018 12:45:05 (CST)  td: 02/22/2018 17:27:59 (CST)  Doc ID   #[id number]  Job ID #[id number]    CC:

## 2018-03-06 ENCOUNTER — HOSPITAL ENCOUNTER (EMERGENCY)
Facility: HOSPITAL | Age: 53
Discharge: HOME | End: 2018-03-06
Attending: EMERGENCY MEDICINE
Payer: MEDICARE

## 2018-03-06 VITALS
BODY MASS INDEX: 25.86 KG/M2 | HEART RATE: 90 BPM | HEIGHT: 66 IN | DIASTOLIC BLOOD PRESSURE: 98 MMHG | RESPIRATION RATE: 18 BRPM | SYSTOLIC BLOOD PRESSURE: 178 MMHG | TEMPERATURE: 98 F | WEIGHT: 160.94 LBS | OXYGEN SATURATION: 99 %

## 2018-03-06 DIAGNOSIS — E11.22 TYPE 2 DIABETES MELLITUS WITH CHRONIC KIDNEY DISEASE ON CHRONIC DIALYSIS: Chronic | ICD-10-CM

## 2018-03-06 DIAGNOSIS — I10 HTN (HYPERTENSION): ICD-10-CM

## 2018-03-06 DIAGNOSIS — N18.6 TYPE 2 DIABETES MELLITUS WITH CHRONIC KIDNEY DISEASE ON CHRONIC DIALYSIS: Chronic | ICD-10-CM

## 2018-03-06 DIAGNOSIS — Z99.2 ESRD (END STAGE RENAL DISEASE) ON DIALYSIS: ICD-10-CM

## 2018-03-06 DIAGNOSIS — R11.2 INTRACTABLE VOMITING WITH NAUSEA, UNSPECIFIED VOMITING TYPE: Primary | ICD-10-CM

## 2018-03-06 DIAGNOSIS — N18.6 ESRD (END STAGE RENAL DISEASE) ON DIALYSIS: ICD-10-CM

## 2018-03-06 DIAGNOSIS — Z99.2 TYPE 2 DIABETES MELLITUS WITH CHRONIC KIDNEY DISEASE ON CHRONIC DIALYSIS: Chronic | ICD-10-CM

## 2018-03-06 DIAGNOSIS — R06.6 HICCUPS: ICD-10-CM

## 2018-03-06 LAB
ALBUMIN SERPL BCP-MCNC: 3.4 G/DL
ALP SERPL-CCNC: 180 U/L
ALT SERPL W/O P-5'-P-CCNC: 11 U/L
ANION GAP SERPL CALC-SCNC: 18 MMOL/L
AST SERPL-CCNC: 23 U/L
BASOPHILS # BLD AUTO: 0.03 K/UL
BASOPHILS NFR BLD: 0.3 %
BILIRUB SERPL-MCNC: 0.4 MG/DL
BUN SERPL-MCNC: 22 MG/DL
CALCIUM SERPL-MCNC: 8.3 MG/DL
CHLORIDE SERPL-SCNC: 95 MMOL/L
CO2 SERPL-SCNC: 30 MMOL/L
CREAT SERPL-MCNC: 7.7 MG/DL
DIFFERENTIAL METHOD: ABNORMAL
EOSINOPHIL # BLD AUTO: 0.9 K/UL
EOSINOPHIL NFR BLD: 9.4 %
ERYTHROCYTE [DISTWIDTH] IN BLOOD BY AUTOMATED COUNT: 13.4 %
EST. GFR  (AFRICAN AMERICAN): 8.4 ML/MIN/1.73 M^2
EST. GFR  (NON AFRICAN AMERICAN): 7.3 ML/MIN/1.73 M^2
GLUCOSE SERPL-MCNC: 123 MG/DL
HCT VFR BLD AUTO: 29.3 %
HGB BLD-MCNC: 10.2 G/DL
IMM GRANULOCYTES # BLD AUTO: 0.03 K/UL
IMM GRANULOCYTES NFR BLD AUTO: 0.3 %
LIPASE SERPL-CCNC: 33 U/L
LYMPHOCYTES # BLD AUTO: 1.9 K/UL
LYMPHOCYTES NFR BLD: 20 %
MCH RBC QN AUTO: 32.1 PG
MCHC RBC AUTO-ENTMCNC: 34.8 G/DL
MCV RBC AUTO: 92 FL
MONOCYTES # BLD AUTO: 0.9 K/UL
MONOCYTES NFR BLD: 9.7 %
NEUTROPHILS # BLD AUTO: 5.6 K/UL
NEUTROPHILS NFR BLD: 60.3 %
NRBC BLD-RTO: 0 /100 WBC
PLATELET # BLD AUTO: 175 K/UL
PMV BLD AUTO: 11.4 FL
POTASSIUM SERPL-SCNC: 3.9 MMOL/L
PROT SERPL-MCNC: 8.4 G/DL
RBC # BLD AUTO: 3.18 M/UL
SODIUM SERPL-SCNC: 143 MMOL/L
WBC # BLD AUTO: 9.36 K/UL

## 2018-03-06 PROCEDURE — 83690 ASSAY OF LIPASE: CPT

## 2018-03-06 PROCEDURE — 99284 EMERGENCY DEPT VISIT MOD MDM: CPT | Mod: ,,, | Performed by: EMERGENCY MEDICINE

## 2018-03-06 PROCEDURE — 99283 EMERGENCY DEPT VISIT LOW MDM: CPT | Mod: 25

## 2018-03-06 PROCEDURE — 63600175 PHARM REV CODE 636 W HCPCS: Performed by: STUDENT IN AN ORGANIZED HEALTH CARE EDUCATION/TRAINING PROGRAM

## 2018-03-06 PROCEDURE — 80053 COMPREHEN METABOLIC PANEL: CPT

## 2018-03-06 PROCEDURE — 85025 COMPLETE CBC W/AUTO DIFF WBC: CPT

## 2018-03-06 PROCEDURE — 96374 THER/PROPH/DIAG INJ IV PUSH: CPT

## 2018-03-06 RX ORDER — ATORVASTATIN CALCIUM 40 MG/1
40 TABLET, FILM COATED ORAL DAILY
Refills: 4 | Status: ON HOLD | COMMUNITY
Start: 2017-12-24 | End: 2018-03-08 | Stop reason: HOSPADM

## 2018-03-06 RX ORDER — ONDANSETRON 2 MG/ML
8 INJECTION INTRAMUSCULAR; INTRAVENOUS
Status: COMPLETED | OUTPATIENT
Start: 2018-03-06 | End: 2018-03-06

## 2018-03-06 RX ORDER — ONDANSETRON HYDROCHLORIDE 8 MG/1
8 TABLET, FILM COATED ORAL EVERY 8 HOURS PRN
Qty: 15 TABLET | Refills: 0 | Status: SHIPPED | OUTPATIENT
Start: 2018-03-06 | End: 2018-05-23 | Stop reason: SDUPTHER

## 2018-03-06 RX ADMIN — ONDANSETRON HYDROCHLORIDE 8 MG: 2 INJECTION, SOLUTION INTRAMUSCULAR; INTRAVENOUS at 09:03

## 2018-03-07 ENCOUNTER — HOSPITAL ENCOUNTER (OUTPATIENT)
Facility: HOSPITAL | Age: 53
Discharge: HOME | End: 2018-03-08
Attending: EMERGENCY MEDICINE | Admitting: EMERGENCY MEDICINE
Payer: MEDICARE

## 2018-03-07 ENCOUNTER — SOCIAL WORK (OUTPATIENT)
Dept: TRANSPLANT | Facility: CLINIC | Age: 53
End: 2018-03-07

## 2018-03-07 DIAGNOSIS — K92.0 HEMATEMESIS, PRESENCE OF NAUSEA NOT SPECIFIED: ICD-10-CM

## 2018-03-07 DIAGNOSIS — K92.0 HEMATEMESIS: Primary | ICD-10-CM

## 2018-03-07 DIAGNOSIS — K92.1 HEMATOCHEZIA: ICD-10-CM

## 2018-03-07 PROBLEM — E87.6 HYPOKALEMIA: Status: ACTIVE | Noted: 2018-03-07

## 2018-03-07 PROBLEM — R79.89 ELEVATED TROPONIN: Status: ACTIVE | Noted: 2018-03-07

## 2018-03-07 LAB
ABO + RH BLD: NORMAL
ALBUMIN SERPL BCP-MCNC: 3.3 G/DL
ALP SERPL-CCNC: 169 U/L
ALT SERPL W/O P-5'-P-CCNC: 10 U/L
ANION GAP SERPL CALC-SCNC: 15 MMOL/L
APTT BLDCRRT: 23.4 SEC
AST SERPL-CCNC: 17 U/L
BASOPHILS # BLD AUTO: 0.02 K/UL
BASOPHILS # BLD AUTO: 0.03 K/UL
BASOPHILS NFR BLD: 0.3 %
BASOPHILS NFR BLD: 0.3 %
BILIRUB SERPL-MCNC: 0.4 MG/DL
BLD GP AB SCN CELLS X3 SERPL QL: NORMAL
BUN SERPL-MCNC: 28 MG/DL
CALCIUM SERPL-MCNC: 8.1 MG/DL
CHLORIDE SERPL-SCNC: 96 MMOL/L
CO2 SERPL-SCNC: 32 MMOL/L
CREAT SERPL-MCNC: 8.3 MG/DL
DIFFERENTIAL METHOD: ABNORMAL
DIFFERENTIAL METHOD: ABNORMAL
EOSINOPHIL # BLD AUTO: 0.4 K/UL
EOSINOPHIL # BLD AUTO: 0.5 K/UL
EOSINOPHIL NFR BLD: 4.3 %
EOSINOPHIL NFR BLD: 8.2 %
ERYTHROCYTE [DISTWIDTH] IN BLOOD BY AUTOMATED COUNT: 13.5 %
ERYTHROCYTE [DISTWIDTH] IN BLOOD BY AUTOMATED COUNT: 13.6 %
EST. GFR  (AFRICAN AMERICAN): 7.7 ML/MIN/1.73 M^2
EST. GFR  (NON AFRICAN AMERICAN): 6.6 ML/MIN/1.73 M^2
ESTIMATED AVG GLUCOSE: 80 MG/DL
GLUCOSE SERPL-MCNC: 109 MG/DL
HBA1C MFR BLD HPLC: 4.4 %
HCT VFR BLD AUTO: 24.7 %
HCT VFR BLD AUTO: 27.7 %
HGB BLD-MCNC: 8.4 G/DL
HGB BLD-MCNC: 9.5 G/DL
IMM GRANULOCYTES # BLD AUTO: 0.01 K/UL
IMM GRANULOCYTES # BLD AUTO: 0.04 K/UL
IMM GRANULOCYTES NFR BLD AUTO: 0.2 %
IMM GRANULOCYTES NFR BLD AUTO: 0.4 %
INR PPP: 1.1
LYMPHOCYTES # BLD AUTO: 1.3 K/UL
LYMPHOCYTES # BLD AUTO: 2.2 K/UL
LYMPHOCYTES NFR BLD: 13.5 %
LYMPHOCYTES NFR BLD: 33.2 %
MCH RBC QN AUTO: 32.1 PG
MCH RBC QN AUTO: 32.1 PG
MCHC RBC AUTO-ENTMCNC: 34 G/DL
MCHC RBC AUTO-ENTMCNC: 34.3 G/DL
MCV RBC AUTO: 94 FL
MCV RBC AUTO: 94 FL
MONOCYTES # BLD AUTO: 0.7 K/UL
MONOCYTES # BLD AUTO: 0.9 K/UL
MONOCYTES NFR BLD: 9.3 %
MONOCYTES NFR BLD: 9.8 %
NEUTROPHILS # BLD AUTO: 3.2 K/UL
NEUTROPHILS # BLD AUTO: 7 K/UL
NEUTROPHILS NFR BLD: 48.3 %
NEUTROPHILS NFR BLD: 72.2 %
NRBC BLD-RTO: 0 /100 WBC
NRBC BLD-RTO: 0 /100 WBC
PLATELET # BLD AUTO: 140 K/UL
PLATELET # BLD AUTO: 182 K/UL
PMV BLD AUTO: 11.1 FL
PMV BLD AUTO: 11.1 FL
POTASSIUM SERPL-SCNC: 3 MMOL/L
PROT SERPL-MCNC: 7.8 G/DL
PROTHROMBIN TIME: 11.1 SEC
RBC # BLD AUTO: 2.62 M/UL
RBC # BLD AUTO: 2.96 M/UL
SODIUM SERPL-SCNC: 143 MMOL/L
TROPONIN I SERPL DL<=0.01 NG/ML-MCNC: 0.12 NG/ML
TROPONIN I SERPL DL<=0.01 NG/ML-MCNC: 0.13 NG/ML
TROPONIN I SERPL DL<=0.01 NG/ML-MCNC: 0.15 NG/ML
WBC # BLD AUTO: 6.6 K/UL
WBC # BLD AUTO: 9.69 K/UL

## 2018-03-07 PROCEDURE — 85025 COMPLETE CBC W/AUTO DIFF WBC: CPT | Mod: 91

## 2018-03-07 PROCEDURE — C9113 INJ PANTOPRAZOLE SODIUM, VIA: HCPCS | Performed by: HOSPITALIST

## 2018-03-07 PROCEDURE — 86850 RBC ANTIBODY SCREEN: CPT

## 2018-03-07 PROCEDURE — 63600175 PHARM REV CODE 636 W HCPCS: Performed by: HOSPITALIST

## 2018-03-07 PROCEDURE — 63600175 PHARM REV CODE 636 W HCPCS: Performed by: EMERGENCY MEDICINE

## 2018-03-07 PROCEDURE — 93005 ELECTROCARDIOGRAM TRACING: CPT

## 2018-03-07 PROCEDURE — G0378 HOSPITAL OBSERVATION PER HR: HCPCS

## 2018-03-07 PROCEDURE — 99285 EMERGENCY DEPT VISIT HI MDM: CPT

## 2018-03-07 PROCEDURE — 84484 ASSAY OF TROPONIN QUANT: CPT | Mod: 91

## 2018-03-07 PROCEDURE — 99220 PR INITIAL OBSERVATION CARE,LEVL III: CPT | Mod: ,,, | Performed by: HOSPITALIST

## 2018-03-07 PROCEDURE — 25000003 PHARM REV CODE 250: Performed by: HOSPITALIST

## 2018-03-07 PROCEDURE — 36415 COLL VENOUS BLD VENIPUNCTURE: CPT

## 2018-03-07 PROCEDURE — 83036 HEMOGLOBIN GLYCOSYLATED A1C: CPT

## 2018-03-07 PROCEDURE — 82272 OCCULT BLD FECES 1-3 TESTS: CPT

## 2018-03-07 PROCEDURE — 99214 OFFICE O/P EST MOD 30 MIN: CPT | Mod: GC,,, | Performed by: INTERNAL MEDICINE

## 2018-03-07 PROCEDURE — 85610 PROTHROMBIN TIME: CPT

## 2018-03-07 PROCEDURE — 85730 THROMBOPLASTIN TIME PARTIAL: CPT

## 2018-03-07 PROCEDURE — C9113 INJ PANTOPRAZOLE SODIUM, VIA: HCPCS | Performed by: EMERGENCY MEDICINE

## 2018-03-07 PROCEDURE — 84484 ASSAY OF TROPONIN QUANT: CPT

## 2018-03-07 PROCEDURE — 96375 TX/PRO/DX INJ NEW DRUG ADDON: CPT

## 2018-03-07 PROCEDURE — 80053 COMPREHEN METABOLIC PANEL: CPT

## 2018-03-07 PROCEDURE — 96374 THER/PROPH/DIAG INJ IV PUSH: CPT

## 2018-03-07 PROCEDURE — 99284 EMERGENCY DEPT VISIT MOD MDM: CPT | Mod: ,,, | Performed by: EMERGENCY MEDICINE

## 2018-03-07 RX ORDER — INSULIN ASPART 100 [IU]/ML
0-5 INJECTION, SOLUTION INTRAVENOUS; SUBCUTANEOUS
Status: DISCONTINUED | OUTPATIENT
Start: 2018-03-07 | End: 2018-03-08 | Stop reason: HOSPADM

## 2018-03-07 RX ORDER — LISINOPRIL 20 MG/1
40 TABLET ORAL NIGHTLY
Status: DISCONTINUED | OUTPATIENT
Start: 2018-03-07 | End: 2018-03-08 | Stop reason: HOSPADM

## 2018-03-07 RX ORDER — POTASSIUM CHLORIDE 20 MEQ/15ML
30 SOLUTION ORAL ONCE
Status: COMPLETED | OUTPATIENT
Start: 2018-03-07 | End: 2018-03-07

## 2018-03-07 RX ORDER — CINACALCET 60 MG/1
60 TABLET, FILM COATED ORAL
Status: DISCONTINUED | OUTPATIENT
Start: 2018-03-08 | End: 2018-03-08 | Stop reason: HOSPADM

## 2018-03-07 RX ORDER — POLYETHYLENE GLYCOL 3350 17 G/17G
17 POWDER, FOR SOLUTION ORAL DAILY
Status: DISCONTINUED | OUTPATIENT
Start: 2018-03-07 | End: 2018-03-08 | Stop reason: HOSPADM

## 2018-03-07 RX ORDER — AMLODIPINE BESYLATE 10 MG/1
10 TABLET ORAL EVERY MORNING
Status: DISCONTINUED | OUTPATIENT
Start: 2018-03-07 | End: 2018-03-08

## 2018-03-07 RX ORDER — SODIUM CHLORIDE 0.9 % (FLUSH) 0.9 %
5 SYRINGE (ML) INJECTION
Status: DISCONTINUED | OUTPATIENT
Start: 2018-03-07 | End: 2018-03-08 | Stop reason: HOSPADM

## 2018-03-07 RX ORDER — HYDRALAZINE HYDROCHLORIDE 25 MG/1
50 TABLET, FILM COATED ORAL EVERY 8 HOURS PRN
Status: DISCONTINUED | OUTPATIENT
Start: 2018-03-07 | End: 2018-03-08 | Stop reason: HOSPADM

## 2018-03-07 RX ORDER — ACETAMINOPHEN 325 MG/1
650 TABLET ORAL EVERY 6 HOURS PRN
Status: DISCONTINUED | OUTPATIENT
Start: 2018-03-07 | End: 2018-03-08 | Stop reason: HOSPADM

## 2018-03-07 RX ORDER — PANTOPRAZOLE SODIUM 40 MG/10ML
40 INJECTION, POWDER, LYOPHILIZED, FOR SOLUTION INTRAVENOUS
Status: COMPLETED | OUTPATIENT
Start: 2018-03-07 | End: 2018-03-07

## 2018-03-07 RX ORDER — IBUPROFEN 200 MG
24 TABLET ORAL
Status: DISCONTINUED | OUTPATIENT
Start: 2018-03-07 | End: 2018-03-08 | Stop reason: HOSPADM

## 2018-03-07 RX ORDER — CARVEDILOL 25 MG/1
25 TABLET ORAL 2 TIMES DAILY WITH MEALS
Status: DISCONTINUED | OUTPATIENT
Start: 2018-03-07 | End: 2018-03-08 | Stop reason: HOSPADM

## 2018-03-07 RX ORDER — ATORVASTATIN CALCIUM 20 MG/1
80 TABLET, FILM COATED ORAL DAILY
Status: DISCONTINUED | OUTPATIENT
Start: 2018-03-07 | End: 2018-03-08 | Stop reason: HOSPADM

## 2018-03-07 RX ORDER — GLUCAGON 1 MG
1 KIT INJECTION
Status: DISCONTINUED | OUTPATIENT
Start: 2018-03-07 | End: 2018-03-08 | Stop reason: HOSPADM

## 2018-03-07 RX ORDER — PANTOPRAZOLE SODIUM 40 MG/10ML
40 INJECTION, POWDER, LYOPHILIZED, FOR SOLUTION INTRAVENOUS 2 TIMES DAILY
Status: DISCONTINUED | OUTPATIENT
Start: 2018-03-07 | End: 2018-03-08

## 2018-03-07 RX ORDER — IBUPROFEN 200 MG
16 TABLET ORAL
Status: DISCONTINUED | OUTPATIENT
Start: 2018-03-07 | End: 2018-03-08 | Stop reason: HOSPADM

## 2018-03-07 RX ORDER — METOCLOPRAMIDE HYDROCHLORIDE 5 MG/ML
10 INJECTION INTRAMUSCULAR; INTRAVENOUS
Status: COMPLETED | OUTPATIENT
Start: 2018-03-07 | End: 2018-03-07

## 2018-03-07 RX ORDER — ONDANSETRON 2 MG/ML
4 INJECTION INTRAMUSCULAR; INTRAVENOUS EVERY 8 HOURS PRN
Status: DISCONTINUED | OUTPATIENT
Start: 2018-03-07 | End: 2018-03-08 | Stop reason: HOSPADM

## 2018-03-07 RX ORDER — AMOXICILLIN 250 MG
2 CAPSULE ORAL DAILY
Status: DISCONTINUED | OUTPATIENT
Start: 2018-03-07 | End: 2018-03-08 | Stop reason: HOSPADM

## 2018-03-07 RX ADMIN — PANTOPRAZOLE SODIUM 40 MG: 40 INJECTION, POWDER, FOR SOLUTION INTRAVENOUS at 05:03

## 2018-03-07 RX ADMIN — ATORVASTATIN CALCIUM 80 MG: 20 TABLET, FILM COATED ORAL at 01:03

## 2018-03-07 RX ADMIN — POTASSIUM CHLORIDE 30 MEQ: 20 SOLUTION ORAL at 01:03

## 2018-03-07 RX ADMIN — PANTOPRAZOLE SODIUM 40 MG: 40 INJECTION, POWDER, FOR SOLUTION INTRAVENOUS at 07:03

## 2018-03-07 RX ADMIN — CARVEDILOL 25 MG: 25 TABLET, FILM COATED ORAL at 05:03

## 2018-03-07 RX ADMIN — AMLODIPINE BESYLATE 10 MG: 10 TABLET ORAL at 12:03

## 2018-03-07 RX ADMIN — METOCLOPRAMIDE 10 MG: 5 INJECTION, SOLUTION INTRAMUSCULAR; INTRAVENOUS at 07:03

## 2018-03-07 RX ADMIN — ONDANSETRON 4 MG: 2 INJECTION INTRAMUSCULAR; INTRAVENOUS at 03:03

## 2018-03-07 RX ADMIN — LISINOPRIL 40 MG: 20 TABLET ORAL at 08:03

## 2018-03-07 NOTE — ED NOTES
Pt is AA&O times four Resp full and reg Breath sounds clear thuy to all lung fields  Abd soft to touch Bowel sounds audible to all four quad of abd on auscultation Saline lock # 20 ga to left hand in place and intact  on resuming care of pt  Right upper arm AV shunt noted with palpable thrill Pt is on cardiac monitor and pulse oximetry

## 2018-03-07 NOTE — CONSULTS
[facility]  Gastroenterology  Consult Note    Patient Name: Vaughn Retana  MRN: [id number]  Admission Date: 3/7/2018  Hospital Length of Stay: 0 days  Code Status: Prior   Attending Provider: Gavino Knight MD   Consulting Provider: Jann Valladares MD  Primary Care Physician: Primary Doctor No  Principal Problem:Hematemesis    Inpatient consult to Gastroenterology  Consult performed by: JANN VALLADARES  Consult ordered by: GAVINO KNIGHT        Subjective:     HPI:  This is a 53 yo M with Dm, HTN, HLD, CHF, ESRD on Hd, and hx of esophagitis presented to the ED from dialysis after he reported dark colored emesis. Patient reports for the past two days, he has been having nausea and vomiting unclear why. He denies any other symptoms. He is a poor historian but reports his emesis is dark which he thinks is from cola soda. Patient denies any alcohol use, abdominal pain, nausea, vomiting, fevers. He has a history of similar presentations in the past, last in April 2017 and one in 2014 - both times was found to have LA Grade D esophagitis. He did not get follow up EGD after 8 weeks. He is hemodynamically stable with stable blood counts.    EGD 4/4/17  Impression:   - LA Grade D reflux esophagitis.                        - Small hiatal hernia.                        - Normal stomach.                        - Normal examined duodenum.                        - No specimens collected.    Past Medical History:   Diagnosis Date    Amputation stump pain 9/10/2013    Aspiration pneumonia 7/27/2015    Asterixis 11/8/2016    C. difficile colitis 8/7/2015    Cholelithiasis without obstruction 8/25/2015    Chronic diastolic heart failure     2-23-17   1 - Low normal to mildly depressed left ventricular systolic function (EF 50-55%).    2 - Right ventricular enlargement with mildly depressed systolic function.    3 - Left ventricular diastolic dysfunction.    4 - Right atrial enlargement.    5 -  Severe tricuspid regurgitation.    6 - Pulmonary hypertension. The estimated PA systolic pressure is 86 mmHg.    7 - Increased central venous pressure.    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    Metabolic acidosis, IAG, reduced excretion of inorganic acids     Myoclonic jerking 9/20/2016    Secondary hyperparathyroidism (of renal origin)     Secondary pulmonary hypertension 3/23/2017    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    TB lung, latent 08/25/2015    Negative Quantiferon Gold 3-23-17       Past Surgical History:   Procedure Laterality Date    COLONOSCOPY      COLONOSCOPY N/A 4/4/2017    Procedure: COLONOSCOPY;  Surgeon: Walker Stern MD;  Location: [facility] ([address] [place] [address]);  Service: Endoscopy;  Laterality: N/A;  PA Systolic Pressure 85.56. HD Patient MWF, K+ lab prior to procedure.     FOOT AMPUTATION THROUGH METATARSAL      left foot    LEG AMPUTATION THROUGH KNEE  12/18/2013    left BKA    R AVF  9/12/12       Review of patient's allergies indicates:   Allergen Reactions    Fosrenol [lanthanum] Nausea And Vomiting     Nausea and vomiting     Family History     Problem Relation (Age of Onset)    Alcohol abuse Maternal Grandmother    Diabetes Brother, Maternal Grandfather    Early death Mother    Heart disease Father    Hyperlipidemia Father    Hypertension Father, Sister    Kidney disease Father        Social History Main Topics    Smoking status: Former Smoker     Packs/day: 1.00     Years: 10.00     Smokeless tobacco: Never Used    Alcohol use No    Drug use: No    Sexual activity: Yes     Partners: Female     Birth control/ protection: None     Review of Systems   Constitutional: Negative for activity change, chills and fever.   HENT: Negative for sore throat and trouble swallowing.    Respiratory: Negative for cough and shortness of breath.    Cardiovascular: Negative for chest pain and leg swelling.   Gastrointestinal: Positive for constipation. Negative for abdominal distention, abdominal pain, diarrhea, nausea and vomiting.   Genitourinary: Negative for flank pain.   Musculoskeletal: Negative for arthralgias and back pain.   Skin: Negative for color change and pallor.   Neurological: Negative for dizziness and light-headedness.   Psychiatric/Behavioral: Negative for agitation and confusion.     Objective:     Vital Signs (Most Recent):  Temp: 98.4 °F (36.9 °C) (03/07/18 1445)  Pulse: 90 (03/07/18 1445)  Resp: 18 (03/07/18 1445)  BP: (!) 176/93 (03/07/18 1445)  SpO2: 99 % (03/07/18 1445) Vital Signs (24h Range):  Temp:  [98.2 °F (36.8 °C)-99 °F (37.2 °C)] 98.4 °F (36.9 °C)  Pulse:  [] 90  Resp:  [9-20] 18  SpO2:  [98 %-100 %] 99 %  BP: (168-214)/() 176/93     Weight: 86.2 kg (190 lb) (03/07/18 0613)  Body mass index is 29.76 kg/m².    No intake or output data in the 24 hours ending 03/07/18 1456    Lines/Drains/Airways     Drain                 Hemodialysis AV Fistula Right upper arm -- days         Hemodialysis AV Graft 01/22/11 1828 Right upper arm 2600 days          Peripheral Intravenous Line                 Peripheral IV - Single Lumen 03/07/18 0700 Left Wrist less than 1 day         Peripheral IV - Single Lumen 03/07/18 0705 Left Hand less than 1 day                Physical Exam   Constitutional: He is oriented to person, place, and time.   Poor historian, slow to answer questions   HENT:   Mouth/Throat: Oropharynx is clear and moist.   Eyes: No scleral icterus.   Cardiovascular: Normal  rate and regular rhythm.    Pulmonary/Chest: Effort normal and breath sounds normal.   Abdominal: Soft. He exhibits no distension and no mass. There is no tenderness. There is no guarding.   Musculoskeletal: He exhibits no edema or deformity.   Lymphadenopathy:     He has no cervical adenopathy.   Neurological: He is alert and oriented to person, place, and time.   Skin: Skin is warm and dry.   Psychiatric: He has a normal mood and affect.   Vitals reviewed.      Significant Labs:  CBC:   Recent Labs  Lab 03/06/18 2122 03/07/18  0705   WBC 9.36 9.69   HGB 10.2* 9.5*   HCT 29.3* 27.7*    182     CMP:   Recent Labs  Lab 03/07/18  0705      CALCIUM 8.1*   ALBUMIN 3.3*   PROT 7.8      K 3.0*   CO2 32*   CL 96   BUN 28*   CREATININE 8.3*   ALKPHOS 169*   ALT 10   AST 17   BILITOT 0.4     Coagulation:   Recent Labs  Lab 03/07/18  0705   INR 1.1   APTT 23.4           Assessment/Plan:     Nausea & vomiting    53 yo M with nausea and vomiting and reports of dark colored emesis. Unclear if this is true hematemesis given he reports drinking dark colored beverage prior to vomiting and his blood counts are stable. He also has known history of LA Grade D esophagitis from which he may have had coffee ground emesis. Given he is stable and his blood counts are stable, recommend outpatient EGD.    Recommendations:  - Start PPI BID to continue for 8 weeks  - Will set up outpatient EGD unless patient's blood counts drops or develops overt bleeding            Thank you for your consult. I will follow-up with patient. Please contact us if you have any additional questions.    Jann Onofre MD  Gastroenterology  [facility]

## 2018-03-07 NOTE — ED TRIAGE NOTES
Pt actively vomiting upon entering the room. Pt states he goes to dialysis Mon, Wed, Fri. Pt states he started to feel bad yesterday before dialysis and started to vomit all day yesterday and today. Pt states he is unable to keep anything down. Pt c/o abdominal cramping and vomiting that became worse this evening. He states he ate breakfast from a corner store today and is unsure if food made him sick.

## 2018-03-07 NOTE — ED NOTES
Pt presents to ED via EMS from dialysis after he began vomiting.  Pt did not receive dialysis treatment today.  Goes M-W-F.  Fistula right arm.  Denies pain at this time.  AAOx3.

## 2018-03-07 NOTE — ED PROVIDER NOTES
SCRIBE #1 NOTE: I, uSjatha Ortiz, am scribing for, and in the presence of,  Dr. Buck. I have scribed the entire note.           CC: Hematemesis (Patient seen in ED a few hours ago for nausea and vomiting. Pt is a dilalysis pt and went to dialysis after he was discharged from here. He continued to have nausea and vomiting and was sent here. Emesis is dark)      HPI: Vaughn Retana is a 52 y.o. year old male who presents to the ED from the dialysis clinic complaining of an episode of darkly colored emesis this morning when he presented for dialysis.   Pt states that he has vomited 6 times in the past 2 days. He was seen here in the ED yesterday and was given some medication that somewhat alleviated his sx until this morning. He was able to tolerate PO yesterday and states that he ate eggs, a cheeseburger, and drank some root beer. Pt states that he has had prior episodes of vomiting with darkly colored emesis. His last bowel movement was 5 days ago, for which he endorses black stool. His last full dialysis was two days ago and he did not get dialysis today. His fistula is in his right arm. Pt is unsure how long he has had black stool. He is not in any pain presently. Pt denies any abdominal pain, diarrhea, alcohol or tobacco use, hx of liver problems, hx of diabetes, or hx of HTN. He does not take aspirin or ibuprofen and is not on oxygen at home.         Past Medical History:   Diagnosis Date    Amputation stump pain 9/10/2013    Aspiration pneumonia 7/27/2015    Asterixis 11/8/2016    C. difficile colitis 8/7/2015    Cholelithiasis without obstruction 8/25/2015    Chronic diastolic heart failure     2-23-17   1 - Low normal to mildly depressed left ventricular systolic function (EF 50-55%).    2 - Right ventricular enlargement with mildly depressed systolic function.    3 - Left ventricular diastolic dysfunction.    4 - Right atrial enlargement.    5 - Severe tricuspid regurgitation.    6 - Pulmonary  hypertension. The estimated PA systolic pressure is 86 mmHg.    7 - Increased central venous pressure.    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    Metabolic acidosis, IAG, reduced excretion of inorganic acids     Myoclonic jerking 9/20/2016    Secondary hyperparathyroidism (of renal origin)     Secondary pulmonary hypertension 3/23/2017    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    TB lung, latent 08/25/2015    Negative Quantiferon Gold 3-23-17     Past Surgical History:   Procedure Laterality Date    COLONOSCOPY      COLONOSCOPY N/A 4/4/2017    Procedure: COLONOSCOPY;  Surgeon: Walker Stern MD;  Location: [facility] ([address] [place] [address]);  Service: Endoscopy;  Laterality: N/A;  PA Systolic Pressure 85.56. HD Patient MWF, K+ lab prior to procedure.     FOOT AMPUTATION THROUGH METATARSAL      left foot    LEG AMPUTATION THROUGH KNEE  12/18/2013    left BKA    R AVF  9/12/12     Family History   Problem Relation Age of Onset    Early death Mother     Kidney disease Father     Hypertension Father     Heart disease Father     Hyperlipidemia Father     Diabetes Brother     Alcohol abuse Maternal Grandmother     Diabetes Maternal Grandfather     Hypertension Sister     Melanoma Neg Hx      Current Facility-Administered Medications on File Prior to Encounter   Medication Dose Route Frequency Provider Last Rate Last Dose    [COMPLETED] ondansetron injection 8 mg  8 mg Intravenous ED 1 Time Efraín Jefferson MD   8 mg  at 03/06/18 2122     Current Outpatient Prescriptions on File Prior to Encounter   Medication Sig Dispense Refill    amLODIPine (NORVASC) 10 MG tablet Take 1 tablet (10 mg total) by mouth every morning. 90 tablet 4    atorvastatin (LIPITOR) 40 MG tablet Take 40 mg by mouth once daily.  4    atorvastatin (LIPITOR) 80 MG tablet Take 1 tablet (80 mg total) by mouth once daily. 30 tablet 2    carvedilol (COREG) 25 MG tablet Take 1 tablet (25 mg total) by mouth 2 (two) times daily with meals. 180 tablet 4    chlorproMAZINE (THORAZINE) 25 MG tablet Take 1 tablet (25 mg total) by mouth 3 (three) times daily. 90 tablet 11    cinacalcet (SENSIPAR) 60 MG Tab Take 1 tablet (60 mg total) by mouth daily with breakfast. 30 tablet 3    famotidine (PEPCID) 40 MG tablet Take 40 mg by mouth once daily.  1    hydrALAZINE (APRESOLINE) 50 MG tablet Take 1 tablet (50 mg total) by mouth every 8 (eight) hours as needed (systolic blood pressure (top number) > 180). 90 tablet 4    lisinopril (PRINIVIL,ZESTRIL) 40 MG tablet Take 1 tablet (40 mg total) by mouth every evening. 90 tablet 4    omeprazole (PRILOSEC) 40 MG capsule Take 1 capsule (40 mg total) by mouth 2 (two) times daily before meals. 90 capsule 3    ondansetron (ZOFRAN) 8 MG tablet Take 1 tablet (8 mg total) by mouth every 8 (eight) hours as needed for Nausea. 15 tablet 0     Fosrenol [lanthanum]  Social History     Social History    Marital status:      Spouse name: N/A    Number of children: N/A    Years of education: N/A     Social History Main Topics    Smoking status: Former Smoker     Packs/day: 1.00     Years: 10.00    Smokeless tobacco: Never Used    Alcohol use No    Drug use: No    Sexual activity: Yes     Partners: Female     Birth control/ protection: None     Other Topics Concern    None     Social History Narrative    None       ROS:     Constitutional : neg  HEENT neg  Resp neg  Cardiac  neg  GI positive for vomiting, black stool    neg  Neuro neg  Heme/Immune: neg  Endo neg  Skin neg    PHYSICAL EXAM:  Vitals:    03/07/18 0754   BP: (!) 175/97   Pulse: 86   Resp: 16   Temp: 98.7 °F (37.1 °C)         PHYSICAL EXAM:   general: comfortable, in no acute distress  VS: triage VS reviewed  HEENT: NC/AT, PERRL, EOMI  Neck: trachea midline  CV: RRR, no m/r/g, no LE pitting edema  Resp: CTAB  ABD:  ND, + normal BS, NT, rectal exam with dark stool, positive hemoccult   Renal: No CVAT  Neuro: AAO x 3, 5/5 muscle strength in upper and lower extremities, sensation grossly intact, face symmetric, speech normal  Ext: no deformity, +2 symmetrical peripheral pulses, left above the knee amputation with prosthesis, right arm dialysis fistula with good bruit          DATA & INTERVENTIONS:    LABS reviewed:  Labs Reviewed   CBC W/ AUTO DIFFERENTIAL - Abnormal; Notable for the following:        Result Value    RBC 2.96 (*)     Hemoglobin 9.5 (*)     Hematocrit 27.7 (*)     MCH 32.1 (*)     Lymph% 13.5 (*)     All other components within normal limits   COMPREHENSIVE METABOLIC PANEL - Abnormal; Notable for the following:     Potassium 3.0 (*)     CO2 32 (*)     BUN, Bld 28 (*)     Creatinine 8.3 (*)     Calcium 8.1 (*)     Albumin 3.3 (*)     Alkaline Phosphatase 169 (*)     eGFR if  7.7 (*)     eGFR if non  6.6 (*)     All other components within normal limits   TROPONIN I - Abnormal; Notable for the following:     Troponin I 0.128 (*)     All other components within normal limits   APTT   PROTIME-INR   TYPE & SCREEN       RADIOLOGY reviewed:  Imaging Results          X-Ray Chest PA And Lateral (Final result)  Result time 03/07/18 07:51:38    Final result by Sonia Duncan MD (03/07/18 07:51:38)                 Impression:        No acute radiographic findings in the chest.      Electronically signed by: SONIA DUNCAN MD  Date:     03/07/18  Time:    07:51              Narrative:    Technique: PA and LAT chest  radiographs.    Comparison: Radiograph 07/28/2017.    Findings:    Vascular stent projects over the presumed location of the subclavian vessels.  Mediastinal structures are midline. Cardiac silhouette is normal in size. Lung volumes are normal and symmetric. No pulmonary consolidation.  No pneumothorax or pleural effusion. No free air beneath the diaphragm. Bones demonstrate no acute abnormalities.                              MEDICATIONS/FLUIDS:  Medications   pantoprazole injection 40 mg (40 mg Intravenous Given 3/7/18 0722)   metoclopramide HCl injection 10 mg (10 mg Intravenous Given 3/7/18 0743)        EKG: NST at 91 bpm, , no acute ischemic changes.      MDM: 52 year old male with hematemesis x2 days. Prior EGD April 2017 with esophagitis. Will obtain CBC, CMP, coags. Will give Reglan and pantoprazole.       patietn w/ hematemesis since Monday, seen yesterday and discharged, today w/ hematemesis at the dialysis center (did not have dialysis), was sent here for eval  + hemoccult  Hg 10.2 yesterdya, 9.5 today; platelets wnl  Trop elevated but w/ CKD and prior elev trop similar in the past  Esrd, K 3  Ast/alt wnl  cxr no acute, no free air under the diaphragm    Stable vitals, diaphoretic on arrival  No further emesis in the ED    Chart reviewed: previous EGD April 2017  w/ gastritis  Pantoprazole, no stigmata of cirrhosis, no abd pain at this time, ddx : pud, gastritis>> variceal bleed    IMPRESSION:  1.) hematemesis    Dispo: Observation    Critical Care Time: N/A       Komal Buck MD  03/07/18 143       Komal Buck MD  03/07/18 3663

## 2018-03-07 NOTE — ED PROVIDER NOTES
"Encounter Date: 3/6/2018       History     Chief Complaint   Patient presents with    Emesis     Pt presents to ED c/o "being sick". Reports n/v. Monday was last dialysis.     Patient is a 51-year-old male with a past medical history of HTN, DM 2, ESRD on HD on MWF, and hypertension who presents with intractable vomiting. Patient states it started yesterday after HD and has not stopped. Patient denies substance ingestion, alcohol use, states he ate food from corner store this AM. No associated symptoms. Denies fever, chills, chest pain, shortness of breath, abdominal pain, constipation, diarrhea.          Review of patient's allergies indicates:   Allergen Reactions    Fosrenol [lanthanum] Nausea And Vomiting     Nausea and vomiting     Past Medical History:   Diagnosis Date    Amputation stump pain 9/10/2013    Aspiration pneumonia 7/27/2015    Asterixis 11/8/2016    C. difficile colitis 8/7/2015    Cholelithiasis without obstruction 8/25/2015    Chronic diastolic heart failure     2-23-17   1 - Low normal to mildly depressed left ventricular systolic function (EF 50-55%).    2 - Right ventricular enlargement with mildly depressed systolic function.    3 - Left ventricular diastolic dysfunction.    4 - Right atrial enlargement.    5 - Severe tricuspid regurgitation.    6 - Pulmonary hypertension. The estimated PA systolic pressure is 86 mmHg.    7 - Increased central venous pressure.    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 " 8/1/2015    Metabolic acidosis, IAG, reduced excretion of inorganic acids     Myoclonic jerking 9/20/2016    Secondary hyperparathyroidism (of renal origin)     Secondary pulmonary hypertension 3/23/2017    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    TB lung, latent 08/25/2015    Negative Quantiferon Gold 3-23-17     Past Surgical History:   Procedure Laterality Date    COLONOSCOPY      COLONOSCOPY N/A 4/4/2017    Procedure: COLONOSCOPY;  Surgeon: Walker Stern MD;  Location: [facility] ([address] [place] [address]);  Service: Endoscopy;  Laterality: N/A;  PA Systolic Pressure 85.56. HD Patient MWF, K+ lab prior to procedure.     FOOT AMPUTATION THROUGH METATARSAL      left foot    LEG AMPUTATION THROUGH KNEE  12/18/2013    left BKA    R AVF  9/12/12     Family History   Problem Relation Age of Onset    Early death Mother     Kidney disease Father     Hypertension Father     Heart disease Father     Hyperlipidemia Father     Diabetes Brother     Alcohol abuse Maternal Grandmother     Diabetes Maternal Grandfather     Hypertension Sister     Melanoma Neg Hx      Social History   Substance Use Topics    Smoking status: Former Smoker     Packs/day: 1.00     Years: 10.00    Smokeless tobacco: Never Used    Alcohol use No     Review of Systems   Constitutional: Negative for chills and fever.   HENT: Negative for congestion and sore throat.    Eyes: Negative.    Respiratory: Negative for cough and shortness of breath.    Cardiovascular: Negative for chest pain and leg swelling.   Gastrointestinal: Negative for abdominal pain, constipation and diarrhea.   Endocrine: Negative.    Genitourinary: Negative.    Musculoskeletal: Negative.    Neurological: Negative for light-headedness and headaches.       Physical Exam     Initial Vitals [03/06/18 2008]   BP Pulse Resp Temp SpO2   (!) 199/99 87 18 98.2 °F (36.8 °C) 100 %      MAP       132.33         Physical Exam    Constitutional: He appears well-developed and  well-nourished.   HENT:   Head: Normocephalic and atraumatic.   Eyes: EOM are normal.   Neck: Normal range of motion.   Pulmonary/Chest: No respiratory distress.   Abdominal: Soft. He exhibits no distension. There is no tenderness. There is no rebound and no guarding.   Musculoskeletal: He exhibits no edema.   Left prosthetic leg.   Neurological: He is alert.         ED Course   Procedures  Labs Reviewed - No data to display          Medical Decision Making:   Initial Assessment:   51 yo M who presents with nausea/vomiting.  Clinical Tests:   Lab Tests: Ordered       APC / Resident Notes:   Patient actively vomiting on my exam. Given Zofran 8 mg IV with complete resolution of symptoms. CBC, CMP, Lipase ordered. Discharged home in stable condition with close follow up with PCP.          Attending Note:  Physician Attestation Statement: I have personally seen and examined this patient. As the supervising MD I agree with the above history. As the supervising MD I agree with the above PE. As the supervising MD I agree with the above treatment, course, plan, and disposition.             Clinical Impression:   The primary encounter diagnosis was Intractable vomiting with nausea, unspecified vomiting type. A diagnosis of Hiccups was also pertinent to this visit.    Disposition:   Disposition: Discharged  Condition: Stable                        Yimi Rainey MD  03/07/18 0125

## 2018-03-07 NOTE — H&P
[facility] Medicine  History & Physical    Patient Name: Vaughn Retana  MRN: [id number]  Admission Date: 3/7/2018  Attending Physician: Gavino Cordoba MD  Primary Care Provider: Primary Doctor [facility] Medicine Team: [facility] HOSP MED B Gavino Cordoba MD     Patient information was obtained from patient, past medical records and ER records.     Subjective:     Principal Problem:Hematemesis    Chief Complaint:   Chief Complaint   Patient presents with    Hematemesis     Patient seen in ED a few hours ago for nausea and vomiting. Pt is a dilalysis pt and went to dialysis after he was discharged from here. He continued to have nausea and vomiting and was sent here. Emesis is dark        HPI: Vaughn Retana is a 52 y.o. male with a PMH of of HF, DM, ESRD on hemodialysis, HCV, ICH (L BG 5/2013; R BG 9/2016; R BG 11/2016; L caudate head 2/2017), and HTN who presented to the ED on 3/7/2018 diagnosed with  Hematemesis (Patient seen in ED a few hours ago for nausea and vomiting. Pt is a dilalysis pt and went to dialysis after he was discharged from here. He continued to have nausea and vomiting and was sent here. Emesis is dark)  AAO x2. mentions to feel bad yesterday with nausea and several episodes of black vomit all day yesterday . mentioned he ate breakfast from a corner store today and is unsure if food made him sick. was  seen in the ED 0n 3/6  -given Zofran 8 mg IV with complete resolution of symptoms, discharged home  with close follow up with PCP.was able to tolerate PO yesterday,  mentions that he went home, ate at [employer] last night. Woke up this AM and went to HD - started having episodes of darkly colored emesis transferred to ED for further evaluation. His last bowel movement was 5 days ago on saturday, for which he endorses black stool. reports dark BM for several days. last full session of  dialysis was two days ago  on Monday and he did not get dialysis today.  does not take aspirin or OTC NSAIDS     Baseline ADL independent    Past Medical History:   Diagnosis Date    Amputation stump pain 9/10/2013    Aspiration pneumonia 7/27/2015    Asterixis 11/8/2016    C. difficile colitis 8/7/2015    Cholelithiasis without obstruction 8/25/2015    Chronic diastolic heart failure     2-23-17   1 - Low normal to mildly depressed left ventricular systolic function (EF 50-55%).    2 - Right ventricular enlargement with mildly depressed systolic function.    3 - Left ventricular diastolic dysfunction.    4 - Right atrial enlargement.    5 - Severe tricuspid regurgitation.    6 - Pulmonary hypertension. The estimated PA systolic pressure is 86 mmHg.    7 - Increased central venous pressure.    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    Metabolic acidosis, IAG, reduced excretion of inorganic acids     Myoclonic jerking 9/20/2016    Secondary hyperparathyroidism (of renal origin)     Secondary pulmonary hypertension 3/23/2017    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    TB lung, latent 08/25/2015    Negative Quantiferon Gold 3-23-17       Past Surgical History:   Procedure Laterality Date    COLONOSCOPY      COLONOSCOPY N/A 4/4/2017    Procedure: COLONOSCOPY;  Surgeon: Walker Stern MD;  Location: [facility] ([address] [place] [address]);  Service: Endoscopy;  Laterality: N/A;  PA Systolic Pressure 85.56. HD Patient MWF, K+ lab prior to procedure.     FOOT AMPUTATION THROUGH METATARSAL      left foot     LEG AMPUTATION THROUGH KNEE  12/18/2013    left BKA    R AVF  9/12/12       Review of patient's allergies indicates:   Allergen Reactions    Fosrenol [lanthanum] Nausea And Vomiting     Nausea and vomiting       Current Facility-Administered Medications on File Prior to Encounter   Medication    [COMPLETED] ondansetron injection 8 mg     Current Outpatient Prescriptions on File Prior to Encounter   Medication Sig    amLODIPine (NORVASC) 10 MG tablet Take 1 tablet (10 mg total) by mouth every morning.    atorvastatin (LIPITOR) 40 MG tablet Take 40 mg by mouth once daily.    atorvastatin (LIPITOR) 80 MG tablet Take 1 tablet (80 mg total) by mouth once daily.    carvedilol (COREG) 25 MG tablet Take 1 tablet (25 mg total) by mouth 2 (two) times daily with meals.    chlorproMAZINE (THORAZINE) 25 MG tablet Take 1 tablet (25 mg total) by mouth 3 (three) times daily.    cinacalcet (SENSIPAR) 60 MG Tab Take 1 tablet (60 mg total) by mouth daily with breakfast.    famotidine (PEPCID) 40 MG tablet Take 40 mg by mouth once daily.    hydrALAZINE (APRESOLINE) 50 MG tablet Take 1 tablet (50 mg total) by mouth every 8 (eight) hours as needed (systolic blood pressure (top number) > 180).    lisinopril (PRINIVIL,ZESTRIL) 40 MG tablet Take 1 tablet (40 mg total) by mouth every evening.    omeprazole (PRILOSEC) 40 MG capsule Take 1 capsule (40 mg total) by mouth 2 (two) times daily before meals.    ondansetron (ZOFRAN) 8 MG tablet Take 1 tablet (8 mg total) by mouth every 8 (eight) hours as needed for Nausea.     Family History     Problem Relation (Age of Onset)    Alcohol abuse Maternal Grandmother    Diabetes Brother, Maternal Grandfather    Early death Mother    Heart disease Father    Hyperlipidemia Father    Hypertension Father, Sister    Kidney disease Father        Social History Main Topics    Smoking status: Former Smoker     Packs/day: 1.00     Years: 10.00    Smokeless tobacco: Never Used    Alcohol use  No    Drug use: No    Sexual activity: Yes     Partners: Female     Birth control/ protection: None     Review of Systems   Constitutional: Negative for activity change, appetite change, fatigue and unexpected weight change.   HENT: Negative for congestion, ear discharge, ear pain and rhinorrhea.    Eyes: Negative for pain, discharge, redness and itching.   Respiratory: Negative for cough, shortness of breath and wheezing.    Cardiovascular: Negative for chest pain, palpitations and leg swelling.   Gastrointestinal: Positive for constipation (last BM on saturday), nausea and vomiting (several episodes of black emesis yesterday and today). Negative for abdominal distention, abdominal pain and blood in stool.   Endocrine: Negative for cold intolerance.   Genitourinary: Negative for dysuria, enuresis, flank pain, frequency, hematuria and urgency.        Makes little urine   Musculoskeletal: Negative for arthralgias, back pain, gait problem, neck pain and neck stiffness.   Skin: Negative for color change, pallor and rash.   Allergic/Immunologic: Negative for environmental allergies.   Neurological: Negative for dizziness, syncope, speech difficulty, weakness, light-headedness and headaches.   Hematological: Negative for adenopathy.   Psychiatric/Behavioral: Negative for dysphoric mood.     Objective:     Vital Signs (Most Recent):  Temp: 98.6 °F (37 °C) (03/07/18 1053)  Pulse: 83 (03/07/18 1053)  Resp: 15 (03/07/18 1053)  BP: (!) 179/96 (03/07/18 1053)  SpO2: 100 % (03/07/18 1053) Vital Signs (24h Range):  Temp:  [98.2 °F (36.8 °C)-99 °F (37.2 °C)] 98.6 °F (37 °C)  Pulse:  [] 83  Resp:  [14-20] 15  SpO2:  [98 %-100 %] 100 %  BP: (174-214)/() 179/96     Weight: 86.2 kg (190 lb)  Body mass index is 29.76 kg/m².    Physical Exam   Constitutional: He appears well-developed and well-nourished.   HENT:   Head: Normocephalic and atraumatic.   Mouth/Throat: Oropharynx is clear and moist. No oropharyngeal exudate.    Eyes: Conjunctivae and EOM are normal. Pupils are equal, round, and reactive to light. Right eye exhibits no discharge. Left eye exhibits no discharge. No scleral icterus.   Neck: Normal range of motion. Neck supple. No JVD present. No tracheal deviation present. No thyromegaly present.   Cardiovascular: Normal rate, regular rhythm and normal heart sounds.  Exam reveals no gallop and no friction rub.    No murmur heard.  Pulmonary/Chest: Effort normal and breath sounds normal. No respiratory distress. He has no wheezes. He has no rales.   Abdominal: Soft. Bowel sounds are normal. He exhibits no distension and no mass. There is no tenderness. There is no guarding.   Musculoskeletal: Normal range of motion. He exhibits no edema.   left BKA with prosthesis.LUE AV fistula thrill +    Lymphadenopathy:     He has no cervical adenopathy.   Neurological: No cranial nerve deficit.   AAO x2. able to move upper and lower extremities without limitation   Skin: Skin is warm and dry.   Nursing note and vitals reviewed.        CRANIAL NERVES     CN III, IV, VI   Pupils are equal, round, and reactive to light.  Extraocular motions are normal.       Significant Labs:   A1C:   Recent Labs  Lab 09/09/17  0930   HGBA1C 4.4     ABGs: No results for input(s): PH, PCO2, HCO3, POCSATURATED, BE, TOTALHB, COHB, METHB, O2HB, POCFIO2 in the last 48 hours.  Bilirubin:   Recent Labs  Lab 03/06/18 2122 03/07/18  0705   BILITOT 0.4 0.4     Blood Culture: No results for input(s): LABBLOO in the last 48 hours.  BMP:   Recent Labs  Lab 03/07/18  0705         K 3.0*   CL 96   CO2 32*   BUN 28*   CREATININE 8.3*   CALCIUM 8.1*     CBC:   Recent Labs  Lab 03/06/18 2122 03/07/18  0705   WBC 9.36 9.69   HGB 10.2* 9.5*   HCT 29.3* 27.7*    182     CMP:   Recent Labs  Lab 03/06/18 2122 03/07/18  0705    143   K 3.9 3.0*   CL 95 96   CO2 30* 32*   * 109   BUN 22* 28*   CREATININE 7.7* 8.3*   CALCIUM 8.3* 8.1*   PROT 8.4  7.8   ALBUMIN 3.4* 3.3*   BILITOT 0.4 0.4   ALKPHOS 180* 169*   AST 23 17   ALT 11 10   ANIONGAP 18* 15   EGFRNONAA 7.3* 6.6*     Cardiac Markers: No results for input(s): CKMB, MYOGLOBIN, BNP, TROPISTAT in the last 48 hours.  Coagulation:   Recent Labs  Lab 03/07/18  0705   INR 1.1   APTT 23.4     Lactic Acid: No results for input(s): LACTATE in the last 48 hours.  Lipase:   Recent Labs  Lab 03/06/18  2122   LIPASE 33     Lipid Panel: No results for input(s): CHOL, HDL, LDLCALC, TRIG, CHOLHDL in the last 48 hours.  Magnesium: No results for input(s): MG in the last 48 hours.  POCT Glucose: No results for input(s): POCTGLUCOSE in the last 48 hours.  Prealbumin: No results for input(s): PREALBUMIN in the last 48 hours.  Respiratory Culture: No results for input(s): GSRESP, RESPIRATORYC in the last 48 hours.  Troponin:   Recent Labs  Lab 03/07/18  0705   TROPONINI 0.128*     TSH: No results for input(s): TSH in the last 4320 hours.  Urine Culture: No results for input(s): LABURIN in the last 48 hours.  Urine Studies: No results for input(s): COLORU, APPEARANCEUA, PHUR, SPECGRAV, PROTEINUA, GLUCUA, KETONESU, BILIRUBINUA, OCCULTUA, NITRITE, UROBILINOGEN, LEUKOCYTESUR, RBCUA, WBCUA, BACTERIA, SQUAMEPITHEL, HYALINECASTS in the last 48 hours.    Invalid input(s): WRIGHTSUR  All pertinent labs within the past 24 hours have been reviewed.    Significant Imaging:   Imaging Results          X-Ray Chest PA And Lateral (Final result)  Result time 03/07/18 07:51:38    Final result by Sonia Duncan MD (03/07/18 07:51:38)                 Impression:        No acute radiographic findings in the chest.      Electronically signed by: SONIA DUNCAN MD  Date:     03/07/18  Time:    07:51              Narrative:    Technique: PA and LAT chest radiographs.    Comparison: Radiograph 07/28/2017.    Findings:    Vascular stent projects over the presumed location of the subclavian vessels.  Mediastinal structures are midline. Cardiac  silhouette is normal in size. Lung volumes are normal and symmetric. No pulmonary consolidation.  No pneumothorax or pleural effusion. No free air beneath the diaphragm. Bones demonstrate no acute abnormalities.                            EKG - Normal sinus rhythm @ 91bpm Incomplete right bundle branch block  Prolonged QT      * Nontraumatic intracerebral hemorrhage, unspecified     Mr. Retana is a 52 y.o. male with PMH significant for multiple subcortical ICH, CHF, ESRD on HD, HCV, HTN, HLD, DM2 presents to hospital with dizziness, RSW, and N/V and found to have a L thalamic ICH on 7/24.  ICH thought to be secondary to HTN.     HOLD antiplatelets in setting of ICH  Atorvastatin 80 Qdaily  .8; TSH 1.273; A1c 5  SBP goal <140  Heparin DVT PPX  PT/OT/ST -> rehab; dental soft/thin     Discharge to rehab on atorvastatin 80.  F/u with PCP in 1 week, vascular neurology in 4-6 weeks.          Leukocytosis     -Fever and leukocytosis developed 7/27  -CXR negative for opacifications  -Amylase, lipase wnl  -Procalcitonin elevated to 1.32, suggestive of bacterial infection in setting of ESRD     -Blood cultures x2: NGTD  -US BLE: Negative for DVT per prelim report     No further episodes of fever, leukocytosis stable/decreasing.             ESRD on hemodialysis     On MWF  -Nephrology consulted, appreciate assistance          Vasogenic cerebral edema     2/2 ICH  -Evident on imaging          Singultus     -Gabapentin 100 Qdaily          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     Stroke risk factor  -Goal SBP <140     -Lisinopril 40 Qdaily  -Carvedilol 25 mg BID  -Amlodipine 10 mg QDaily          Constipation     Diarrhea following enema 7/26  -No further episodes of diarrhea  -Will continue bowel regimen          Nausea & vomiting     Improving, s/p reglan, zofran, and bowel movements  -7/26 QTc 491  -On bland diet          Assessment/Plan:     Active Diagnoses:    Diagnosis Date Noted POA    PRINCIPAL  PROBLEM:  Hematemesis [K92.0]Dark vomitus. Hb 10.2--> 9.5. CBC q hrly . protonix 40mg IV BID.previous endocopy 04/17 with esophagitis    Constipation - black stools. FOBT +. bowel regimen 03/07/2018 Yes    Chronic diastolic heart failure [I50.32]seems to be baseline. no decompensation  Yes     Chronic    History of Intracerebral Hemorrhage: L BG 5/2013; R BG 9/2016; R BG 11/2016; L caudate head 2/2017 [Z86.79]  ICH thought to be secondary to HTN. Atorvastatin 80 Qdaily. not on antiplatelets 11/02/2016 Not Applicable     Chronic    ESRD (end stage renal disease) on dialysis [N18.6, Z99.2]MWF. missed HD today. Nephrology consulted  09/16/2016 Not Applicable    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 [I13.2, N18.6]uncontrolled -.started on coreg, amlidipine and lisinopril   Hypokalemia 3- replaced  Elevated troponin -0.1 chronic likely from ESRD. denies cardiac complaints 08/01/2015 Yes     Chronic    Nausea & vomiting [R11.2]on Zofran 10/12/2014 Yes    History of left below knee amputation 12/18/13 [Z89.512]with prosthesis  12/19/2013 Not Applicable     Chronic    Dyslipidemia [E78.5]Atorvastatin 80 Qdaily 02/07/2013 Yes     Chronic    Anemia in chronic kidney disease [N18.9, D63.1]Hb at 9.5. monitor  09/17/2012 Yes     Chronic    Secondary hyperparathyroidism of renal origin [N25.81]On cincalcet  09/17/2012 Yes     Chronic    Type 2 diabetes mellitus with chronic kidney disease on chronic dialysis [E11.22, N18.6, Z99.2]not on medications. obtain HbA1C. SSI insulin 07/27/2012 Not Applicable     Chronic      Problems Resolved During this Admission:    Diagnosis Date Noted Date Resolved POA     VTE Risk Mitigation     None            Gavino Cordoba MD  Department of Hospital Medicine   [facility]

## 2018-03-07 NOTE — PROGRESS NOTES
NATTY faxed adherence form and is awaiting information regarding pt's adherence, caregiver plan, and psychiatric status. NATTY remains available to pt, pt's family, and transplant team at [phone].

## 2018-03-07 NOTE — ED NOTES
Pt transported to OBS 10 on tele box [id number] via stretcher with escort Pt condition stable on leaving the ED, pt belongings are with pt and pt will notify family of room number

## 2018-03-07 NOTE — NURSING
Pt arrived to OBS 10 via stretcher. No distress noted at this time. Pt denies nausea and pain at this time. Pt oriented to room. Fall precautions implemented with bed in lowest position, wheels locked, and call bell within reach. Cardiac monitor on. Prosthetic on. Will continue to monitor closely.

## 2018-03-07 NOTE — SUBJECTIVE & OBJECTIVE
Past Medical History:   Diagnosis Date    Amputation stump pain 9/10/2013    Aspiration pneumonia 7/27/2015    Asterixis 11/8/2016    C. difficile colitis 8/7/2015    Cholelithiasis without obstruction 8/25/2015    Chronic diastolic heart failure     2-23-17   1 - Low normal to mildly depressed left ventricular systolic function (EF 50-55%).    2 - Right ventricular enlargement with mildly depressed systolic function.    3 - Left ventricular diastolic dysfunction.    4 - Right atrial enlargement.    5 - Severe tricuspid regurgitation.    6 - Pulmonary hypertension. The estimated PA systolic pressure is 86 mmHg.    7 - Increased central venous pressure.    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    Metabolic acidosis, IAG, reduced excretion of inorganic acids     Myoclonic jerking 9/20/2016    Secondary hyperparathyroidism (of renal origin)     Secondary pulmonary hypertension 3/23/2017    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    TB lung, latent 08/25/2015    Negative Quantiferon Gold 3-23-17       Past Surgical History:   Procedure Laterality Date    COLONOSCOPY      COLONOSCOPY N/A 4/4/2017    Procedure: COLONOSCOPY;  Surgeon: Walker Stern MD;  [facility] ([address] [place] [address]);  Service: Endoscopy;  Laterality: N/A;  PA Systolic Pressure 85.56. HD Patient MWF, K+ lab prior to procedure.     FOOT AMPUTATION THROUGH METATARSAL      left foot    LEG AMPUTATION THROUGH KNEE  12/18/2013    left BKA    R AVF   9/12/12       Review of patient's allergies indicates:   Allergen Reactions    Fosrenol [lanthanum] Nausea And Vomiting     Nausea and vomiting     Family History     Problem Relation (Age of Onset)    Alcohol abuse Maternal Grandmother    Diabetes Brother, Maternal Grandfather    Early death Mother    Heart disease Father    Hyperlipidemia Father    Hypertension Father, Sister    Kidney disease Father        Social History Main Topics    Smoking status: Former Smoker     Packs/day: 1.00     Years: 10.00    Smokeless tobacco: Never Used    Alcohol use No    Drug use: No    Sexual activity: Yes     Partners: Female     Birth control/ protection: None     Review of Systems   Constitutional: Negative for activity change, chills and fever.   HENT: Negative for sore throat and trouble swallowing.    Respiratory: Negative for cough and shortness of breath.    Cardiovascular: Negative for chest pain and leg swelling.   Gastrointestinal: Positive for constipation. Negative for abdominal distention, abdominal pain, diarrhea, nausea and vomiting.   Genitourinary: Negative for flank pain.   Musculoskeletal: Negative for arthralgias and back pain.   Skin: Negative for color change and pallor.   Neurological: Negative for dizziness and light-headedness.   Psychiatric/Behavioral: Negative for agitation and confusion.     Objective:     Vital Signs (Most Recent):  Temp: 98.4 °F (36.9 °C) (03/07/18 1445)  Pulse: 90 (03/07/18 1445)  Resp: 18 (03/07/18 1445)  BP: (!) 176/93 (03/07/18 1445)  SpO2: 99 % (03/07/18 1445) Vital Signs (24h Range):  Temp:  [98.2 °F (36.8 °C)-99 °F (37.2 °C)] 98.4 °F (36.9 °C)  Pulse:  [] 90  Resp:  [9-20] 18  SpO2:  [98 %-100 %] 99 %  BP: (168-214)/() 176/93     Weight: 86.2 kg (190 lb) (03/07/18 0613)  Body mass index is 29.76 kg/m².    No intake or output data in the 24 hours ending 03/07/18 1456    Lines/Drains/Airways     Drain                 Hemodialysis AV Fistula Right upper arm --  days         Hemodialysis AV Graft 01/22/11 1828 Right upper arm 2600 days          Peripheral Intravenous Line                 Peripheral IV - Single Lumen 03/07/18 0700 Left Wrist less than 1 day         Peripheral IV - Single Lumen 03/07/18 0705 Left Hand less than 1 day                Physical Exam   Constitutional: He is oriented to person, place, and time.   Poor historian, slow to answer questions   HENT:   Mouth/Throat: Oropharynx is clear and moist.   Eyes: No scleral icterus.   Cardiovascular: Normal rate and regular rhythm.    Pulmonary/Chest: Effort normal and breath sounds normal.   Abdominal: Soft. He exhibits no distension and no mass. There is no tenderness. There is no guarding.   Musculoskeletal: He exhibits no edema or deformity.   Lymphadenopathy:     He has no cervical adenopathy.   Neurological: He is alert and oriented to person, place, and time.   Skin: Skin is warm and dry.   Psychiatric: He has a normal mood and affect.   Vitals reviewed.      Significant Labs:  CBC:   Recent Labs  Lab 03/06/18 2122 03/07/18  0705   WBC 9.36 9.69   HGB 10.2* 9.5*   HCT 29.3* 27.7*    182     CMP:   Recent Labs  Lab 03/07/18  0705      CALCIUM 8.1*   ALBUMIN 3.3*   PROT 7.8      K 3.0*   CO2 32*   CL 96   BUN 28*   CREATININE 8.3*   ALKPHOS 169*   ALT 10   AST 17   BILITOT 0.4     Coagulation:   Recent Labs  Lab 03/07/18  0705   INR 1.1   APTT 23.4

## 2018-03-07 NOTE — ASSESSMENT & PLAN NOTE
51 yo M with nausea and vomiting and reports of dark colored emesis. Unclear if this is true hematemesis given he reports drinking dark colored beverage prior to vomiting and his blood counts are stable. He also has known history of LA Grade D esophagitis from which he may have had coffee ground emesis. Given he is stable and his blood counts are stable, recommend outpatient EGD.    Recommendations:  - Start PPI BID to continue for 8 weeks  - Will set up outpatient EGD unless patient's blood counts drops or develops overt bleeding

## 2018-03-08 ENCOUNTER — ANESTHESIA (OUTPATIENT)
Dept: ENDOSCOPY | Facility: HOSPITAL | Age: 53
End: 2018-03-08
Payer: MEDICARE

## 2018-03-08 ENCOUNTER — SURGERY (OUTPATIENT)
Age: 53
End: 2018-03-08

## 2018-03-08 ENCOUNTER — ANESTHESIA EVENT (OUTPATIENT)
Dept: ENDOSCOPY | Facility: HOSPITAL | Age: 53
End: 2018-03-08
Payer: MEDICARE

## 2018-03-08 VITALS
SYSTOLIC BLOOD PRESSURE: 157 MMHG | BODY MASS INDEX: 29.82 KG/M2 | TEMPERATURE: 96 F | OXYGEN SATURATION: 97 % | DIASTOLIC BLOOD PRESSURE: 87 MMHG | WEIGHT: 190 LBS | RESPIRATION RATE: 16 BRPM | HEIGHT: 67 IN | HEART RATE: 85 BPM

## 2018-03-08 PROBLEM — K92.0 HEMATEMESIS: Status: RESOLVED | Noted: 2018-03-07 | Resolved: 2018-03-08

## 2018-03-08 PROBLEM — E87.6 HYPOKALEMIA: Status: ACTIVE | Noted: 2018-03-08

## 2018-03-08 LAB
ALBUMIN SERPL BCP-MCNC: 2.7 G/DL
ALP SERPL-CCNC: 147 U/L
ALT SERPL W/O P-5'-P-CCNC: 8 U/L
ANION GAP SERPL CALC-SCNC: 14 MMOL/L
AST SERPL-CCNC: 13 U/L
BASOPHILS # BLD AUTO: 0.02 K/UL
BASOPHILS # BLD AUTO: 0.02 K/UL
BASOPHILS # BLD AUTO: 0.03 K/UL
BASOPHILS NFR BLD: 0.3 %
BASOPHILS NFR BLD: 0.3 %
BASOPHILS NFR BLD: 0.5 %
BILIRUB SERPL-MCNC: 0.5 MG/DL
BUN SERPL-MCNC: 32 MG/DL
CALCIUM SERPL-MCNC: 7.8 MG/DL
CHLORIDE SERPL-SCNC: 96 MMOL/L
CO2 SERPL-SCNC: 32 MMOL/L
CREAT SERPL-MCNC: 10 MG/DL
DIFFERENTIAL METHOD: ABNORMAL
EOSINOPHIL # BLD AUTO: 0.6 K/UL
EOSINOPHIL # BLD AUTO: 0.6 K/UL
EOSINOPHIL # BLD AUTO: 0.8 K/UL
EOSINOPHIL NFR BLD: 10.1 %
EOSINOPHIL NFR BLD: 10.3 %
EOSINOPHIL NFR BLD: 12.4 %
ERYTHROCYTE [DISTWIDTH] IN BLOOD BY AUTOMATED COUNT: 13.6 %
ERYTHROCYTE [DISTWIDTH] IN BLOOD BY AUTOMATED COUNT: 13.7 %
ERYTHROCYTE [DISTWIDTH] IN BLOOD BY AUTOMATED COUNT: 14 %
EST. GFR  (AFRICAN AMERICAN): 6.1 ML/MIN/1.73 M^2
EST. GFR  (NON AFRICAN AMERICAN): 5.3 ML/MIN/1.73 M^2
GLUCOSE SERPL-MCNC: 85 MG/DL
HCT VFR BLD AUTO: 23.1 %
HCT VFR BLD AUTO: 25.5 %
HCT VFR BLD AUTO: 27.4 %
HGB BLD-MCNC: 7.9 G/DL
HGB BLD-MCNC: 8.8 G/DL
HGB BLD-MCNC: 9.7 G/DL
IMM GRANULOCYTES # BLD AUTO: 0.01 K/UL
IMM GRANULOCYTES # BLD AUTO: 0.01 K/UL
IMM GRANULOCYTES # BLD AUTO: 0.05 K/UL
IMM GRANULOCYTES NFR BLD AUTO: 0.2 %
IMM GRANULOCYTES NFR BLD AUTO: 0.2 %
IMM GRANULOCYTES NFR BLD AUTO: 0.8 %
LYMPHOCYTES # BLD AUTO: 1.8 K/UL
LYMPHOCYTES # BLD AUTO: 1.9 K/UL
LYMPHOCYTES # BLD AUTO: 2 K/UL
LYMPHOCYTES NFR BLD: 28.6 %
LYMPHOCYTES NFR BLD: 31.1 %
LYMPHOCYTES NFR BLD: 33.4 %
MAGNESIUM SERPL-MCNC: 1.6 MG/DL
MCH RBC QN AUTO: 31.2 PG
MCH RBC QN AUTO: 32.4 PG
MCH RBC QN AUTO: 32.6 PG
MCHC RBC AUTO-ENTMCNC: 34.2 G/DL
MCHC RBC AUTO-ENTMCNC: 34.5 G/DL
MCHC RBC AUTO-ENTMCNC: 35.4 G/DL
MCV RBC AUTO: 91 FL
MCV RBC AUTO: 92 FL
MCV RBC AUTO: 94 FL
MONOCYTES # BLD AUTO: 0.5 K/UL
MONOCYTES # BLD AUTO: 0.6 K/UL
MONOCYTES # BLD AUTO: 0.6 K/UL
MONOCYTES NFR BLD: 8 %
MONOCYTES NFR BLD: 8.9 %
MONOCYTES NFR BLD: 9.8 %
NEUTROPHILS # BLD AUTO: 2.8 K/UL
NEUTROPHILS # BLD AUTO: 3 K/UL
NEUTROPHILS # BLD AUTO: 3.2 K/UL
NEUTROPHILS NFR BLD: 46.2 %
NEUTROPHILS NFR BLD: 49 %
NEUTROPHILS NFR BLD: 49.9 %
NRBC BLD-RTO: 0 /100 WBC
PHOSPHATE SERPL-MCNC: 4.9 MG/DL
PLATELET # BLD AUTO: 128 K/UL
PLATELET # BLD AUTO: 138 K/UL
PLATELET # BLD AUTO: 150 K/UL
PMV BLD AUTO: 10.3 FL
PMV BLD AUTO: 11.3 FL
PMV BLD AUTO: 13.3 FL
POCT GLUCOSE: 78 MG/DL (ref 70–110)
POCT GLUCOSE: 88 MG/DL (ref 70–110)
POCT GLUCOSE: 92 MG/DL (ref 70–110)
POTASSIUM SERPL-SCNC: 3.2 MMOL/L
PROT SERPL-MCNC: 6.5 G/DL
RBC # BLD AUTO: 2.53 M/UL
RBC # BLD AUTO: 2.72 M/UL
RBC # BLD AUTO: 2.98 M/UL
SODIUM SERPL-SCNC: 142 MMOL/L
TROPONIN I SERPL DL<=0.01 NG/ML-MCNC: 0.14 NG/ML
WBC # BLD AUTO: 6.1 K/UL
WBC # BLD AUTO: 6.11 K/UL
WBC # BLD AUTO: 6.44 K/UL

## 2018-03-08 PROCEDURE — D9220A PRA ANESTHESIA: Mod: CRNA,,, | Performed by: NURSE ANESTHETIST, CERTIFIED REGISTERED

## 2018-03-08 PROCEDURE — 84100 ASSAY OF PHOSPHORUS: CPT

## 2018-03-08 PROCEDURE — 25000003 PHARM REV CODE 250: Performed by: INTERNAL MEDICINE

## 2018-03-08 PROCEDURE — 82962 GLUCOSE BLOOD TEST: CPT | Performed by: INTERNAL MEDICINE

## 2018-03-08 PROCEDURE — D9220A PRA ANESTHESIA: Mod: ANES,,, | Performed by: ANESTHESIOLOGY

## 2018-03-08 PROCEDURE — G0378 HOSPITAL OBSERVATION PER HR: HCPCS

## 2018-03-08 PROCEDURE — 63600175 PHARM REV CODE 636 W HCPCS: Performed by: NURSE ANESTHETIST, CERTIFIED REGISTERED

## 2018-03-08 PROCEDURE — 37000009 HC ANESTHESIA EA ADD 15 MINS: Performed by: INTERNAL MEDICINE

## 2018-03-08 PROCEDURE — 25000003 PHARM REV CODE 250: Performed by: HOSPITALIST

## 2018-03-08 PROCEDURE — 80053 COMPREHEN METABOLIC PANEL: CPT

## 2018-03-08 PROCEDURE — 83735 ASSAY OF MAGNESIUM: CPT

## 2018-03-08 PROCEDURE — 99217 PR OBSERVATION CARE DISCHARGE: CPT | Mod: ,,, | Performed by: HOSPITALIST

## 2018-03-08 PROCEDURE — 37000008 HC ANESTHESIA 1ST 15 MINUTES: Performed by: INTERNAL MEDICINE

## 2018-03-08 PROCEDURE — G0257 UNSCHED DIALYSIS ESRD PT HOS: HCPCS

## 2018-03-08 PROCEDURE — 25000003 PHARM REV CODE 250: Performed by: NURSE ANESTHETIST, CERTIFIED REGISTERED

## 2018-03-08 PROCEDURE — 43235 EGD DIAGNOSTIC BRUSH WASH: CPT | Performed by: INTERNAL MEDICINE

## 2018-03-08 PROCEDURE — 43235 EGD DIAGNOSTIC BRUSH WASH: CPT | Mod: ,,, | Performed by: INTERNAL MEDICINE

## 2018-03-08 PROCEDURE — 90935 HEMODIALYSIS ONE EVALUATION: CPT

## 2018-03-08 PROCEDURE — 85025 COMPLETE CBC W/AUTO DIFF WBC: CPT

## 2018-03-08 PROCEDURE — 84484 ASSAY OF TROPONIN QUANT: CPT

## 2018-03-08 PROCEDURE — 36415 COLL VENOUS BLD VENIPUNCTURE: CPT

## 2018-03-08 RX ORDER — PANTOPRAZOLE SODIUM 40 MG/1
40 TABLET, DELAYED RELEASE ORAL DAILY
Status: DISCONTINUED | OUTPATIENT
Start: 2018-03-09 | End: 2018-03-08 | Stop reason: HOSPADM

## 2018-03-08 RX ORDER — SODIUM CHLORIDE 9 MG/ML
INJECTION, SOLUTION INTRAVENOUS ONCE
Status: COMPLETED | OUTPATIENT
Start: 2018-03-08 | End: 2018-03-08

## 2018-03-08 RX ORDER — PANTOPRAZOLE SODIUM 40 MG/1
40 TABLET, DELAYED RELEASE ORAL DAILY
Qty: 30 TABLET | Refills: 11 | Status: ON HOLD | OUTPATIENT
Start: 2018-03-09 | End: 2018-03-16 | Stop reason: DRUGHIGH

## 2018-03-08 RX ORDER — AMOXICILLIN 250 MG
2 CAPSULE ORAL DAILY
Status: ON HOLD | COMMUNITY
Start: 2018-03-08 | End: 2018-04-04

## 2018-03-08 RX ORDER — GLYCOPYRROLATE 0.2 MG/ML
INJECTION INTRAMUSCULAR; INTRAVENOUS
Status: DISCONTINUED | OUTPATIENT
Start: 2018-03-08 | End: 2018-03-08

## 2018-03-08 RX ORDER — SODIUM CHLORIDE 9 MG/ML
INJECTION, SOLUTION INTRAVENOUS
Status: DISCONTINUED | OUTPATIENT
Start: 2018-03-08 | End: 2018-03-08 | Stop reason: HOSPADM

## 2018-03-08 RX ORDER — SODIUM CHLORIDE 9 MG/ML
INJECTION, SOLUTION INTRAVENOUS CONTINUOUS PRN
Status: DISCONTINUED | OUTPATIENT
Start: 2018-03-08 | End: 2018-03-08

## 2018-03-08 RX ORDER — PROPOFOL 10 MG/ML
VIAL (ML) INTRAVENOUS
Status: DISCONTINUED | OUTPATIENT
Start: 2018-03-08 | End: 2018-03-08

## 2018-03-08 RX ORDER — POTASSIUM CHLORIDE 20 MEQ/15ML
30 SOLUTION ORAL ONCE
Status: COMPLETED | OUTPATIENT
Start: 2018-03-08 | End: 2018-03-08

## 2018-03-08 RX ORDER — PROPOFOL 10 MG/ML
VIAL (ML) INTRAVENOUS CONTINUOUS PRN
Status: DISCONTINUED | OUTPATIENT
Start: 2018-03-08 | End: 2018-03-08

## 2018-03-08 RX ORDER — LIDOCAINE HCL/PF 100 MG/5ML
SYRINGE (ML) INTRAVENOUS
Status: DISCONTINUED | OUTPATIENT
Start: 2018-03-08 | End: 2018-03-08

## 2018-03-08 RX ADMIN — LIDOCAINE HYDROCHLORIDE 50 MG: 20 INJECTION, SOLUTION INTRAVENOUS at 03:03

## 2018-03-08 RX ADMIN — PROPOFOL 50 MG: 10 INJECTION, EMULSION INTRAVENOUS at 03:03

## 2018-03-08 RX ADMIN — CARVEDILOL 25 MG: 25 TABLET, FILM COATED ORAL at 04:03

## 2018-03-08 RX ADMIN — SODIUM CHLORIDE: 9 INJECTION, SOLUTION INTRAVENOUS at 09:03

## 2018-03-08 RX ADMIN — PROPOFOL 30 MG: 10 INJECTION, EMULSION INTRAVENOUS at 03:03

## 2018-03-08 RX ADMIN — POTASSIUM CHLORIDE 30 MEQ: 20 SOLUTION ORAL at 04:03

## 2018-03-08 RX ADMIN — ATORVASTATIN CALCIUM 80 MG: 20 TABLET, FILM COATED ORAL at 04:03

## 2018-03-08 RX ADMIN — GLYCOPYRROLATE 0.2 MG: 0.2 INJECTION, SOLUTION INTRAMUSCULAR; INTRAVENOUS at 03:03

## 2018-03-08 RX ADMIN — SODIUM CHLORIDE: 0.9 INJECTION, SOLUTION INTRAVENOUS at 02:03

## 2018-03-08 RX ADMIN — PROPOFOL 150 MCG/KG/MIN: 10 INJECTION, EMULSION INTRAVENOUS at 03:03

## 2018-03-08 NOTE — PROGRESS NOTES
HD treatment started. No complications with access to right upper arm. Lines secured and telemetry in place. No complaints of discomfort at this time.

## 2018-03-08 NOTE — NURSING TRANSFER
Nursing Transfer Note      3/8/2018     Transfer TO OBS 10 A/FROM:post op 28 DOSC    Transfer via stretcher    Transfer with cardiac monitoring    Transported by YOLANDA Kaur    Medicines sent: NONE    Chart send with patient: YES     Notified:RN    Patient reassessed at: 3/8/18. 3285    Upon arrival to floor: patient oriented to room, call bell in reach and bed in lowest position

## 2018-03-08 NOTE — TREATMENT PLAN
GI Treatment Plan    Blood counts dropped since yesterday.     Will plan for EGD today.    Jann Onofre MD PGY-IV  Gastroenterology Fellow  [facility]  P [phone]

## 2018-03-08 NOTE — PLAN OF CARE
Problem: Patient Care Overview  Goal: Plan of Care Review  Outcome: Ongoing (interventions implemented as appropriate)  HD treatment complete. Duration of treatment 3.5 hours and 2 L removed. Treatment was tolerated well and no complications with access to right upper arm. Needles removed and hemostasis achieved. Dressing intact and no drainage noted. Thrill and bruit present.

## 2018-03-08 NOTE — PLAN OF CARE
Problem: Patient Care Overview  Goal: Plan of Care Review  Outcome: Ongoing (interventions implemented as appropriate)  Patient has remained free of falls this shift. He is AAOx4 and VS's have been stable. His H & H has been slowly but steadily going down. He has been in sinus rhythm all shift. He also has stopped vomiting.

## 2018-03-08 NOTE — H&P (VIEW-ONLY)
[facility]  Gastroenterology  Consult Note    Patient Name: Vaughn Retana  MRN: [id number]  Admission Date: 3/7/2018  Hospital Length of Stay: 0 days  Code Status: Prior   Attending Provider: Gavino Knight MD   Consulting Provider: Jann Valladares MD  Primary Care Physician: Primary Doctor No  Principal Problem:Hematemesis    Inpatient consult to Gastroenterology  Consult performed by: JANN VALLADARES  Consult ordered by: GAVINO KNIGHT        Subjective:     HPI:  This is a 53 yo M with Dm, HTN, HLD, CHF, ESRD on Hd, and hx of esophagitis presented to the ED from dialysis after he reported dark colored emesis. Patient reports for the past two days, he has been having nausea and vomiting unclear why. He denies any other symptoms. He is a poor historian but reports his emesis is dark which he thinks is from cola soda. Patient denies any alcohol use, abdominal pain, nausea, vomiting, fevers. He has a history of similar presentations in the past, last in April 2017 and one in 2014 - both times was found to have LA Grade D esophagitis. He did not get follow up EGD after 8 weeks. He is hemodynamically stable with stable blood counts.    EGD 4/4/17  Impression:   - LA Grade D reflux esophagitis.                        - Small hiatal hernia.                        - Normal stomach.                        - Normal examined duodenum.                        - No specimens collected.    Past Medical History:   Diagnosis Date    Amputation stump pain 9/10/2013    Aspiration pneumonia 7/27/2015    Asterixis 11/8/2016    C. difficile colitis 8/7/2015    Cholelithiasis without obstruction 8/25/2015    Chronic diastolic heart failure     2-23-17   1 - Low normal to mildly depressed left ventricular systolic function (EF 50-55%).    2 - Right ventricular enlargement with mildly depressed systolic function.    3 - Left ventricular diastolic dysfunction.    4 - Right atrial enlargement.    5 -  Severe tricuspid regurgitation.    6 - Pulmonary hypertension. The estimated PA systolic pressure is 86 mmHg.    7 - Increased central venous pressure.    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    Metabolic acidosis, IAG, reduced excretion of inorganic acids     Myoclonic jerking 9/20/2016    Secondary hyperparathyroidism (of renal origin)     Secondary pulmonary hypertension 3/23/2017    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    TB lung, latent 08/25/2015    Negative Quantiferon Gold 3-23-17       Past Surgical History:   Procedure Laterality Date    COLONOSCOPY      COLONOSCOPY N/A 4/4/2017    Procedure: COLONOSCOPY;  Surgeon: Walker Stern MD;  Location: [facility] ([address] [place] [address]);  Service: Endoscopy;  Laterality: N/A;  PA Systolic Pressure 85.56. HD Patient MWF, K+ lab prior to procedure.     FOOT AMPUTATION THROUGH METATARSAL      left foot    LEG AMPUTATION THROUGH KNEE  12/18/2013    left BKA    R AVF  9/12/12       Review of patient's allergies indicates:   Allergen Reactions    Fosrenol [lanthanum] Nausea And Vomiting     Nausea and vomiting     Family History     Problem Relation (Age of Onset)    Alcohol abuse Maternal Grandmother    Diabetes Brother, Maternal Grandfather    Early death Mother    Heart disease Father    Hyperlipidemia Father    Hypertension Father, Sister    Kidney disease Father        Social History Main Topics    Smoking status: Former Smoker     Packs/day: 1.00     Years: 10.00     Smokeless tobacco: Never Used    Alcohol use No    Drug use: No    Sexual activity: Yes     Partners: Female     Birth control/ protection: None     Review of Systems   Constitutional: Negative for activity change, chills and fever.   HENT: Negative for sore throat and trouble swallowing.    Respiratory: Negative for cough and shortness of breath.    Cardiovascular: Negative for chest pain and leg swelling.   Gastrointestinal: Positive for constipation. Negative for abdominal distention, abdominal pain, diarrhea, nausea and vomiting.   Genitourinary: Negative for flank pain.   Musculoskeletal: Negative for arthralgias and back pain.   Skin: Negative for color change and pallor.   Neurological: Negative for dizziness and light-headedness.   Psychiatric/Behavioral: Negative for agitation and confusion.     Objective:     Vital Signs (Most Recent):  Temp: 98.4 °F (36.9 °C) (03/07/18 1445)  Pulse: 90 (03/07/18 1445)  Resp: 18 (03/07/18 1445)  BP: (!) 176/93 (03/07/18 1445)  SpO2: 99 % (03/07/18 1445) Vital Signs (24h Range):  Temp:  [98.2 °F (36.8 °C)-99 °F (37.2 °C)] 98.4 °F (36.9 °C)  Pulse:  [] 90  Resp:  [9-20] 18  SpO2:  [98 %-100 %] 99 %  BP: (168-214)/() 176/93     Weight: 86.2 kg (190 lb) (03/07/18 0613)  Body mass index is 29.76 kg/m².    No intake or output data in the 24 hours ending 03/07/18 1456    Lines/Drains/Airways     Drain                 Hemodialysis AV Fistula Right upper arm -- days         Hemodialysis AV Graft 01/22/11 1828 Right upper arm 2600 days          Peripheral Intravenous Line                 Peripheral IV - Single Lumen 03/07/18 0700 Left Wrist less than 1 day         Peripheral IV - Single Lumen 03/07/18 0705 Left Hand less than 1 day                Physical Exam   Constitutional: He is oriented to person, place, and time.   Poor historian, slow to answer questions   HENT:   Mouth/Throat: Oropharynx is clear and moist.   Eyes: No scleral icterus.   Cardiovascular: Normal  rate and regular rhythm.    Pulmonary/Chest: Effort normal and breath sounds normal.   Abdominal: Soft. He exhibits no distension and no mass. There is no tenderness. There is no guarding.   Musculoskeletal: He exhibits no edema or deformity.   Lymphadenopathy:     He has no cervical adenopathy.   Neurological: He is alert and oriented to person, place, and time.   Skin: Skin is warm and dry.   Psychiatric: He has a normal mood and affect.   Vitals reviewed.      Significant Labs:  CBC:   Recent Labs  Lab 03/06/18 2122 03/07/18  0705   WBC 9.36 9.69   HGB 10.2* 9.5*   HCT 29.3* 27.7*    182     CMP:   Recent Labs  Lab 03/07/18  0705      CALCIUM 8.1*   ALBUMIN 3.3*   PROT 7.8      K 3.0*   CO2 32*   CL 96   BUN 28*   CREATININE 8.3*   ALKPHOS 169*   ALT 10   AST 17   BILITOT 0.4     Coagulation:   Recent Labs  Lab 03/07/18  0705   INR 1.1   APTT 23.4           Assessment/Plan:     Nausea & vomiting    51 yo M with nausea and vomiting and reports of dark colored emesis. Unclear if this is true hematemesis given he reports drinking dark colored beverage prior to vomiting and his blood counts are stable. He also has known history of LA Grade D esophagitis from which he may have had coffee ground emesis. Given he is stable and his blood counts are stable, recommend outpatient EGD.    Recommendations:  - Start PPI BID to continue for 8 weeks  - Will set up outpatient EGD unless patient's blood counts drops or develops overt bleeding            Thank you for your consult. I will follow-up with patient. Please contact us if you have any additional questions.    Jann Onofre MD  Gastroenterology  [facility]

## 2018-03-08 NOTE — DISCHARGE SUMMARY
[facility] Hospital Medicine  Discharge Summary      Patient Name: Vaughn Retana  MRN: [id number]  Admission Date: 3/7/2018  Hospital Length of Stay: 0 days  Discharge Date and Time:  03/08/2018 3:44 PM  Attending Physician: Gavino Cordoba MD   Discharging Provider: Gavino Cordoba MD  Primary Care Provider: Primary Doctor [facility] Medicine Team: [facility] MED B Gavino Cordoba MD    HPI: Vaughn Retana is a 52 y.o. male with a PMH of of HF, DM, ESRD on hemodialysis, HCV, ICH (L BG 5/2013; R BG 9/2016; R BG 11/2016; L caudate head 2/2017), and HTN who presented to the ED on 3/7/2018 diagnosed with  Hematemesis (Patient seen in ED a few hours ago for nausea and vomiting. Pt is a dilalysis pt and went to dialysis after he was discharged from here. He continued to have nausea and vomiting and was sent here. Emesis is dark)  AAO x2. mentions to feel bad yesterday with nausea and several episodes of black vomit all day yesterday . mentioned he ate breakfast from a corner store today and is unsure if food made him sick. was  seen in the ED 0n 3/6  -given Zofran 8 mg IV with complete resolution of symptoms, discharged home  with close follow up with PCP.was able to tolerate PO yesterday,  mentions that he went home, ate at [employer] last night. Woke up this AM and went to HD - started having episodes of darkly colored emesis transferred to ED for further evaluation. His last bowel movement was 5 days ago on saturday, for which he endorses black stool. reports dark BM for several days. last full session of  dialysis was two days ago  on Monday and he did not get dialysis today. does not take aspirin or OTC NSAIDS      Baseline ADL independent    Procedure(s) (LRB):  ESOPHAGOGASTRODUODENOSCOPY (EGD) (N/A)      Hospital Course:     Active Diagnoses:     Diagnosis Date Noted POA    PRINCIPAL PROBLEM:  Hematemesis [K92.0]Dark vomitus.HB 10.2--.>8.9. .previous endocopy 04/17 with  esophagitis. EGD LA Grade B reflux esophagitis.                        - 3 cm sliding hiatal hernia.                        - A single gastric polyp.                        - Normal examined duodenum.                        - No specimens collected. started on oral protonix      Constipation - black stools. FOBT +. bowel regimen. bowel regimen.. no active bleeding. follow up with GI for further endoscopy 03/07/2018 Yes    Chronic diastolic heart failure [I50.32]seems to be baseline. no decompensation   Yes       Chronic    History of Intracerebral Hemorrhage: L BG 5/2013; R BG 9/2016; R BG 11/2016; L caudate head 2/2017 [Z86.79]  ICH thought to be secondary to HTN. Atorvastatin 80 Qdaily. not on antiplatelets 11/02/2016 Not Applicable       Chronic    ESRD (end stage renal disease) on dialysis [N18.6, Z99.2]MWF. missed HD today. 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 denies cardiac complaints 08/01/2015 Yes       Chronic    Nausea & vomiting [R11.2]on Zofran 10/12/2014 Yes    History of left below knee amputation 12/18/13 [Z89.512]with prosthesis  12/19/2013 Not Applicable       Chronic    Dyslipidemia [E78.5]Atorvastatin 80 Qdaily 02/07/2013 Yes       Chronic    Anemia in chronic kidney disease [N18.9, D63.1]Hb at 9.5. monitor  09/17/2012 Yes       Chronic    Secondary hyperparathyroidism of renal origin [N25.81]On cincalcet  09/17/2012 Yes       Chronic    Type 2 diabetes mellitus with chronic kidney disease on chronic dialysis [E11.22, N18.6, Z99.2]not on medications. obtain HbA1C at 4.4 07/27/2012 Not Applicable       Chronic         being discharged with GI follow up     Consults:   Consults         Status Ordering Provider     Inpatient consult to Gastroenterology  Once     Provider:  (Not yet  assigned)    Completed JONO KNIGHT     Inpatient consult to Nephrology  Once     Provider:  (Not yet assigned)    Completed JONO KNIGHT          Final Active Diagnoses:    Diagnosis Date Noted POA    Hypokalemia [E87.6] 03/07/2018 Unknown    Elevated troponin [R74.8] 03/07/2018 Yes    Chronic diastolic heart failure [I50.32]  Yes     Chronic    History of Intracerebral Hemorrhage: L BG 5/2013; R BG 9/2016; R BG 11/2016; L caudate head 2/2017 [Z86.79] 11/02/2016 Not Applicable     Chronic    ESRD (end stage renal disease) on dialysis [N18.6, Z99.2] 09/16/2016 Not Applicable    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 [I13.2, N18.6] 08/01/2015 Yes     Chronic    History of left below knee amputation 12/18/13 [Z89.512] 12/19/2013 Not Applicable     Chronic    Dyslipidemia [E78.5] 02/07/2013 Yes     Chronic    ESRD on hemodialysis [N18.6, Z99.2] 02/07/2013 Not Applicable     Chronic    Anemia in chronic kidney disease [N18.9, D63.1] 09/17/2012 Yes     Chronic    Secondary hyperparathyroidism of renal origin [N25.81] 09/17/2012 Yes     Chronic    Type 2 diabetes mellitus with chronic kidney disease on chronic dialysis [E11.22, N18.6, Z99.2] 07/27/2012 Not Applicable     Chronic      Problems Resolved During this Admission:    Diagnosis Date Noted Date Resolved POA    PRINCIPAL PROBLEM:  Hematemesis [K92.0] 03/07/2018 03/08/2018 Yes    Nausea & vomiting [R11.2] 10/12/2014 03/08/2018 Yes    Constipation [K59.00] 10/12/2014 03/08/2018 Yes      Discharged Condition: fair    Disposition: Home or Self CareHome    Follow Up:  Follow-up Information     Primary Doctor No In 1 week.               Patient Instructions:     Ambulatory Referral to Gastroenterology   Referral Priority: Routine Referral Type: Consultation   Referral Reason: Specialty Services Required    Requested Specialty: Gastroenterology    Number of Visits Requested: 1        Medications:  Reconciled Home Medications:    Current Discharge Medication List      START taking these medications    Details   pantoprazole (PROTONIX) 40 MG tablet Take 1 tablet (40 mg total) by mouth once daily.  Qty: 30 tablet, Refills: 11      senna-docusate 8.6-50 mg (PERICOLACE) 8.6-50 mg per tablet Take 2 tablets by mouth once daily.         CONTINUE these medications which have NOT CHANGED    Details   amLODIPine (NORVASC) 10 MG tablet Take 1 tablet (10 mg total) by mouth every morning.  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  Qty: 30 tablet, Refills: 2    Associated Diagnoses: Dyslipidemia      carvedilol (COREG) 25 MG tablet Take 1 tablet (25 mg total) by mouth 2 (two) times daily with meals.  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  Qty: 90 tablet, Refills: 11    Associated Diagnoses: Intractable hiccups      cinacalcet (SENSIPAR) 60 MG Tab Take 1 tablet (60 mg total) by mouth daily with breakfast.  Qty: 30 tablet, Refills: 3    Associated Diagnoses: ESRD (end stage renal disease) on dialysis      hydrALAZINE (APRESOLINE) 50 MG tablet Take 1 tablet (50 mg total) by mouth every 8 (eight) hours as needed (systolic blood pressure (top number) > 180).  Qty: 90 tablet, Refills: 4    Associated Diagnoses: Malignant hypertension with heart failure and end-stage renal dis      lisinopril (PRINIVIL,ZESTRIL) 40 MG tablet Take 1 tablet (40 mg total) by mouth every evening.  Qty: 90 tablet, Refills: 4    Associated Diagnoses: Malignant hypertension with heart failure and end-stage renal dis      ondansetron (ZOFRAN) 8 MG tablet Take 1 tablet (8 mg total) by mouth every 8 (eight) hours as needed for Nausea.  Qty:  15 tablet, Refills: 0    Associated Diagnoses: Hiccups         STOP taking these medications       famotidine (PEPCID) 40 MG tablet Comments:   Reason for Stopping:         omeprazole (PRILOSEC) 40 MG capsule Comments:   Reason for Stopping:               Significant Diagnostic Studies: Labs:   BMP:     Recent Labs  Lab 03/06/18 2122 03/07/18 0705 03/08/18 0620   * 109 85    143 142   K 3.9 3.0* 3.2*   CL 95 96 96   CO2 30* 32* 32*   BUN 22* 28* 32*   CREATININE 7.7* 8.3* 10.0*   CALCIUM 8.3* 8.1* 7.8*   MG  --   --  1.6   , CMP     Recent Labs  Lab 03/06/18 2122 03/07/18 0705 03/08/18 0620    143 142   K 3.9 3.0* 3.2*   CL 95 96 96   CO2 30* 32* 32*   * 109 85   BUN 22* 28* 32*   CREATININE 7.7* 8.3* 10.0*   CALCIUM 8.3* 8.1* 7.8*   PROT 8.4 7.8 6.5   ALBUMIN 3.4* 3.3* 2.7*   BILITOT 0.4 0.4 0.5   ALKPHOS 180* 169* 147*   AST 23 17 13   ALT 11 10 8*   ANIONGAP 18* 15 14   ESTGFRAFRICA 8.4* 7.7* 6.1*   EGFRNONAA 7.3* 6.6* 5.3*   , CBC     Recent Labs  Lab 03/07/18 2122 03/08/18 0040 03/08/18  0757   WBC 6.60 6.11 6.10   HGB 8.4* 7.9* 8.8*   HCT 24.7* 23.1* 25.5*   * 138* 150   , INR   Lab Results   Component Value Date    INR 1.1 03/07/2018    INR 1.0 07/28/2017    INR 1.0 07/27/2017   , Lipid Panel   Lab Results   Component Value Date    CHOL 161 09/09/2017    HDL 50 09/09/2017    LDLCALC 89.0 09/09/2017    TRIG 110 09/09/2017    CHOLHDL 31.1 09/09/2017   , Troponin     Recent Labs  Lab 03/08/18  0040   TROPONINI 0.142*   , A1C:     Recent Labs  Lab 03/07/18  1217   HGBA1C 4.4    and All labs within the past 24 hours have been reviewed  Microbiology:   Blood Culture   Lab Results   Component Value Date    LABBLOO No growth after 5 days. 07/27/2017   , Sputum Culture   Lab Results   Component Value Date    GSRESP >10 epithelial cells per low power field 08/07/2015    GSRESP Rare WBC's 08/07/2015    GSRESP Moderate Gram positive cocci 08/07/2015    GSRESP Moderate Gram  positive rods 08/07/2015    GSRESP Few Gram negative rods 08/07/2015    GSRESP Rare yeast 08/07/2015    RESPIRATORYC Normal respiratory joy 08/07/2015    and Urine Culture    Lab Results   Component Value Date    LABURIN No growth 08/06/2015     Radiology:   Imaging Results          X-Ray Chest PA And Lateral (Final result)  Result time 03/07/18 07:51:38    Final result by Sonia Duncan MD (03/07/18 07:51:38)                 Impression:        No acute radiographic findings in the chest.      Electronically signed by: SONIA DUNCAN MD  Date:     03/07/18  Time:    07:51              Narrative:    Technique: PA and LAT chest radiographs.    Comparison: Radiograph 07/28/2017.    Findings:    Vascular stent projects over the presumed location of the subclavian vessels.  Mediastinal structures are midline. Cardiac silhouette is normal in size. Lung volumes are normal and symmetric. No pulmonary consolidation.  No pneumothorax or pleural effusion. No free air beneath the diaphragm. Bones demonstrate no acute abnormalities.                                Pending Diagnostic Studies:     Procedure Component Value Units Date/Time    CBC auto differential [[id number]] Collected:  03/08/18 1637    Order Status:  Sent Lab Status:  In process Updated:  03/08/18 1637    Specimen:  Blood from Blood     Narrative:       Collection has been rescheduled by CAB2 at 3/8/2018 16:21 Reason:   just pop up        Indwelling Lines/Drains at time of discharge:   Lines/Drains/Airways     Drain                 Hemodialysis AV Fistula Right upper arm -- days         Hemodialysis AV Graft 01/22/11 1828 Right upper arm 2601 days         Hemodialysis AV Fistula 03/08/18 1522 Right upper arm less than 1 day                  Gavino Cordoba MD  Department of Hospital Medicine  [facility]

## 2018-03-08 NOTE — PLAN OF CARE
Payor: MEDICARE / Plan: MEDICARE PART A & B / Product Type: Government /      Primary Doctor No       [id number] - [place], [place] - [address]  [address]  [place] [address]  Phone: [phone] Fax: [phone]    [facility] - [place], [place] - [address]  [address] [address]  Phone: [phone] Fax: [phone]      Extended Emergency Contact Information  Primary Emergency Contact: Alicia Retana   [place]  Mobile Phone: [phone]  Relation: Sister  Secondary Emergency Contact: Daisy Longoria   [facility]  Home Phone: [phone]  Relation: Daughter        03/08/18 1141   Discharge Assessment   Assessment Type Discharge Planning Assessment   Confirmed/corrected address and phone number on facesheet? Yes   Assessment information obtained from? Patient   Communicated expected length of stay with patient/caregiver yes   Prior to hospitilization cognitive status: Alert/Oriented   Prior to hospitalization functional status: Assistive Equipment   Current cognitive status: Alert/Oriented   Current Functional Status: Assistive Equipment   Lives With sibling(s)  (Lives with sister)   Able to Return to Prior Arrangements yes   Is patient able to care for self after discharge? Yes   Who are your caregiver(s) and their phone number(s)? Sister Alicia [phone]   Patient's perception of discharge disposition home or selfcare   Readmission Within The Last 30 Days no previous admission in last 30 days   Patient currently being followed by outpatient case management? Yes   Patient currently receives any other outside agency services? Yes   Equipment Currently Used at Home walker, rolling   Do you have any problems affording any of your prescribed medications? No   Is the patient taking medications as prescribed? yes   Does the patient have transportation home? Yes   Dialysis Name and  Scheduled days MWF   Does the patient receive services at the [facility]? No   Discharge Plan A Home;Home with family   Discharge Plan B Home   Patient/Family In Agreement With Plan yes

## 2018-03-08 NOTE — ANESTHESIA RELEASE NOTE
"Anesthesia Release from PACU Note    Patient: Vaughn Retana    Procedure(s) Performed: Procedure(s) (LRB):  ESOPHAGOGASTRODUODENOSCOPY (EGD) (N/A)    Anesthesia type: general    Post pain: Adequate analgesia    Post assessment: no apparent anesthetic complications, tolerated procedure well and no evidence of recall    Last Vitals:   Visit Vitals  BP (!) 149/86 (BP Location: Left arm, Patient Position: Sitting)   Pulse 88   Temp 36.6 °C (97.9 °F) (Temporal)   Resp 20   Ht 5' 7" (1.702 m)   Wt 86.2 kg (190 lb)   SpO2 100%   BMI 29.76 kg/m²       Post vital signs: stable    Level of consciousness: awake, alert  and oriented    Nausea/Vomiting: no nausea/no vomiting    Complications: none    Airway Patency: patent    Respiratory: unassisted    Cardiovascular: stable and blood pressure at baseline    Hydration: euvolemic  "

## 2018-03-08 NOTE — INTERVAL H&P NOTE
Pre-Procedure H and P Addendum    Patient seen and examined.  History and exam unchanged from prior history and physical.      Procedure: EGD  Indication: Hematemesis, anemia, history of reflux esophagitis  ASA Class: per anesthesiology  Airway: normal  Neck Mobility: full range of motion  Mallampatti score: per anesthesia  History of anesthesia problems: no  Family history of anesthesia problems: no  Anesthesia Plan: MAC    Anesthesia/Surgery risks, benefits and alternative options discussed and understood by patient/family.          Active Hospital Problems    Diagnosis  POA    *Hematemesis [K92.0]  Yes    Hypokalemia [E87.6]  Unknown    Elevated troponin [R74.8]  Yes    Chronic diastolic heart failure [I50.32]  Yes     Chronic     2-23-17    1 - Low normal to mildly depressed left ventricular systolic function (EF 50-55%).     2 - Right ventricular enlargement with mildly depressed systolic function.     3 - Left ventricular diastolic dysfunction.     4 - Right atrial enlargement.     5 - Severe tricuspid regurgitation.     6 - Pulmonary hypertension. The estimated PA systolic pressure is 86 mmHg.     7 - Increased central venous pressure.      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 [I13.2, N18.6]  Yes     Chronic    Nausea & vomiting [R11.2]  Yes    Constipation [K59.00]  Yes    History of left below knee amputation 12/18/13 [Z89.512]  Not Applicable     Chronic    Dyslipidemia [E78.5]  Yes     Chronic    ESRD on hemodialysis [N18.6, Z99.2]  Not Applicable     Chronic     MWF at [facility]      Anemia in chronic kidney disease [N18.9, D63.1]  Yes     Chronic    Secondary hyperparathyroidism of renal origin [N25.81]  Yes     Chronic    Type 2 diabetes mellitus with chronic  kidney disease on chronic dialysis [E11.22, N18.6, Z99.2]  Not Applicable     Chronic      Resolved Hospital Problems    Diagnosis Date Resolved POA   No resolved problems to display.

## 2018-03-08 NOTE — PROVATION PATIENT INSTRUCTIONS
Discharge Summary/Instructions after an Endoscopic Procedure  Patient Name: Vaughn Retana  Patient MRN: [id number]  Patient YOB: 1965 Thursday, March 08, 2018  Man Galicia MD  RESTRICTIONS:  During your procedure today, you received medications for sedation.  These   medications may affect your judgment, balance and coordination.  Therefore,   for 24 hours, you have the following restrictions:   - DO NOT drive a car, operate machinery, make legal/financial decisions,   sign important papers or drink alcohol.    ACTIVITY:  The following day: return to full activity including work, except no heavy   lifting, straining or running for 3 days if polyps were removed.  DIET:  Eat and drink normally unless instructed otherwise.     TREATMENT FOR COMMON SIDE EFFECTS:  - Mild abdominal pain, nausea, belching, bloating or excessive gas:  rest,   eat lightly and use a heating pad.  - Sore Throat: treat with throat lozenges and/or gargle with warm salt   water.  - Because air was used during the procedure, expelling large amounts of air   from your rectum or belching is normal.  - If a bowel prep was taken, you may not have a bowel movement for 1-3 days.    This is normal.  SYMPTOMS TO WATCH FOR AND REPORT TO YOUR PHYSICIAN:  1. Abdominal pain or bloating, other than gas cramps.  2. Chest pain.  3. Back pain.  4. Signs of infection such as: chills or fever occurring within 24 hours   after the procedure.  5. Rectal bleeding, which would show as bright red, maroon, or black stools.   (A tablespoon of blood from the rectum is not serious, especially if   hemorrhoids are present.)  6. Vomiting.  7. Weakness or dizziness.  GO DIRECTLY TO THE NEAREST EMERGENCY ROOM IF YOU HAVE ANY OF THE FOLLOWING:      Difficulty breathing  Chills and/or fever over 101 F   Persistent vomiting and/or vomiting blood   Severe abdominal pain   Severe chest pain   Black, tarry stools   Bleeding- more than one tablespoon   Any other  symptom or condition that you feel may need urgent attention  Your doctor recommends these additional instructions:  If any biopsies were taken, your doctors clinic will contact you in 1 to 2   weeks with any results.  You are being discharged to home.   You have a contact number available for emergencies.  The signs and symptoms   of potential delayed complications were discussed with you.  You may return   to normal activities tomorrow.  Written discharge instructions were   provided to you.   Resume your previous diet.   Continue your present medications.   Return to your GI clinic.   A proton pump inhibitor medication will be prescribed to be taken by mouth   once a day indefinitely.  For questions, problems or results please call your physician - Man Galicia MD at Work:  [phone].  [facility], EMERGENCY ROOM PHONE NUMBER: [phone]  IF A COMPLICATION OR EMERGENCY SITUATION ARISES AND YOU ARE UNABLE TO REACH   YOUR PHYSICIAN - GO DIRECTLY TO THE EMERGENCY ROOM.  Man Galicia MD  3/8/2018 3:25:20 PM  This report has been verified and signed electronically.

## 2018-03-08 NOTE — ANESTHESIA POSTPROCEDURE EVALUATION
"Anesthesia Post Evaluation    Patient: Vaughn Retana    Procedure(s) Performed: Procedure(s) (LRB):  ESOPHAGOGASTRODUODENOSCOPY (EGD) (N/A)    Final Anesthesia Type: general  Patient location during evaluation: [facility]  Patient participation: Yes- Able to Participate  Level of consciousness: awake and alert  Post-procedure vital signs: reviewed and stable  Pain management: adequate  Airway patency: patent  PONV status at discharge: No PONV  Anesthetic complications: no      Cardiovascular status: blood pressure returned to baseline  Respiratory status: unassisted  Hydration status: euvolemic  Follow-up not needed.        Visit Vitals  BP (!) 149/86 (BP Location: Left arm, Patient Position: Sitting)   Pulse 88   Temp 36.6 °C (97.9 °F) (Temporal)   Resp 20   Ht 5' 7" (1.702 m)   Wt 86.2 kg (190 lb)   SpO2 100%   BMI 29.76 kg/m²       Pain/Haseeb Score: Pain Assessment Performed: Yes (3/8/2018  3:22 PM)  Presence of Pain: denies (3/8/2018  3:22 PM)  Haseeb Score: 9 (3/8/2018  3:22 PM)      "

## 2018-03-08 NOTE — PROGRESS NOTES
Spoke to Dr. Galicia for pt complaints of hunger and wanting to drink, asked if pt was cleared to drink and eat from endoscopy stand point. States pt is ok to eat and drink.

## 2018-03-08 NOTE — TRANSFER OF CARE
"Anesthesia Transfer of Care Note    Patient: Vaughn Retana    Procedure(s) Performed: Procedure(s) (LRB):  ESOPHAGOGASTRODUODENOSCOPY (EGD) (N/A)    Patient location: [facility]    Anesthesia Type: general    Transport from OR: Transported from OR on 2-3 L/min O2 by NC with adequate spontaneous ventilation    Post pain: adequate analgesia    Post assessment: no apparent anesthetic complications    Post vital signs: stable    Level of consciousness: sedated    Nausea/Vomiting: no nausea/vomiting    Complications: none    Transfer of care protocol was followed      Last vitals:   Visit Vitals  BP (!) 172/98 (BP Location: Left arm, Patient Position: Lying)   Pulse 77   Temp 36.3 °C (97.4 °F)   Resp 16   Ht 5' 7" (1.702 m)   Wt 86.2 kg (190 lb)   SpO2 100%   BMI 29.76 kg/m²     "

## 2018-03-08 NOTE — PLAN OF CARE
Problem: Patient Care Overview  Goal: Plan of Care Review  Outcome: Ongoing (interventions implemented as appropriate)  Pt aware awaiting transport to dialysis. VSS, NAD noted. Bed locked in lowest position, side rails upx2, call bell within reach. Pt denies pain at this time. Pt ambulates well with prosthetic limb.

## 2018-03-08 NOTE — PROGRESS NOTES
Pt report given to LINDA Araiza RN. Dr. Galicia is to come and speak to pt later this evening and discuss procedure findings.  No biopsy or lab results taken per endoscopy RN.

## 2018-03-08 NOTE — CONSULTS
[facility]  Nephrology  Consult Note    Patient Name: Vaughn Retana  MRN: [id number]  Admission Date: 3/7/2018  Hospital Length of Stay: 0 days  Attending Provider: Jono Knight MD   Primary Care Physician: Primary Doctor No  Principal Problem:Hematemesis    Inpatient consult to Nephrology  Consult performed by: KIARRA LAYTON  Consult ordered by: JONO KNIGHT  Reason for consult: ESRD on HD, inpatient dialysis treatment         Subjective:     HPI: Vaughn Retana is a 52 year old male with past medical history of ESRD on HD (MWF), Dm, HTN and HLD. Patient arrived to the ED from dialysis after he reported dark colored emesis and has noticed that his stools have been darker. On arrival presented with a Hgb 10, which staid stable between 8.4-8.8 next two days he was in the hospital. He was evaluated by gastroenterologist yesterday and recommended PPI BID to continue for 8 weeks and set up outpatient EGD unless patient's blood counts drops or develops overt bleeding. Nephrology was consulted for inpatient dialysis treatment.     Nephrology: iHD MWF at [facility] via [facility], followed by Dr. Hubbard, duration 3.5 hrs, UF around 1 L, EDW=71.5, still has residual urine and last time he was dialyzed was on Monday.     Past Medical History:   Diagnosis Date    Amputation stump pain 9/10/2013    Aspiration pneumonia 7/27/2015    Asterixis 11/8/2016    C. difficile colitis 8/7/2015    Cholelithiasis without obstruction 8/25/2015    Chronic diastolic heart failure     2-23-17   1 - Low normal to mildly depressed left ventricular systolic function (EF 50-55%).    2 - Right ventricular enlargement with mildly depressed systolic function.    3 - Left ventricular diastolic dysfunction.    4 - Right atrial enlargement.    5 - Severe tricuspid regurgitation.    6 - Pulmonary hypertension. The estimated PA systolic pressure is 86 mmHg.    7 - Increased central venous pressure.    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    Metabolic acidosis, IAG, reduced excretion of inorganic acids     Myoclonic jerking 9/20/2016    Secondary hyperparathyroidism (of renal origin)     Secondary pulmonary hypertension 3/23/2017    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    TB lung, latent 08/25/2015    Negative Quantiferon Gold 3-23-17       Past Surgical History:   Procedure Laterality Date    COLONOSCOPY      COLONOSCOPY N/A 4/4/2017    Procedure: COLONOSCOPY;  Surgeon: Walker Stern MD;  Location: [facility] ([address] [place] [address]);  Service: Endoscopy;  Laterality: N/A;  PA Systolic Pressure 85.56. HD Patient MWF, K+ lab prior to procedure.     FOOT AMPUTATION THROUGH METATARSAL      left foot    LEG AMPUTATION THROUGH KNEE  12/18/2013    left BKA    R AVF  9/12/12       Review of patient's allergies indicates:   Allergen Reactions    Fosrenol [lanthanum] Nausea And Vomiting     Nausea and vomiting     Current Facility-Administered Medications   Medication Frequency    0.9%  NaCl infusion PRN    0.9%  NaCl infusion Once    acetaminophen tablet 650 mg Q6H PRN    atorvastatin tablet 80 mg Daily    carvedilol tablet 25 mg BID WM    cinacalcet tablet 60 mg Daily with breakfast    dextrose 50% injection 12.5 g PRN    dextrose 50% injection 25 g PRN    glucagon (human recombinant) injection 1 mg PRN    glucose chewable tablet 16 g PRN    glucose chewable tablet 24 g PRN    hydrALAZINE tablet 50 mg Q8H PRN    insulin  aspart U-100 pen 0-5 Units QID (AC + HS) PRN    lisinopril tablet 40 mg QHS    ondansetron injection 4 mg Q8H PRN    pantoprazole injection 40 mg BID    polyethylene glycol packet 17 g Daily    potassium chloride 10% solution 30 mEq Once    senna-docusate 8.6-50 mg per tablet 2 tablet Daily    sodium chloride 0.9% flush 5 mL PRN     Family History     Problem Relation (Age of Onset)    Alcohol abuse Maternal Grandmother    Diabetes Brother, Maternal Grandfather    Early death Mother    Heart disease Father    Hyperlipidemia Father    Hypertension Father, Sister    Kidney disease Father        Social History Main Topics    Smoking status: Former Smoker     Packs/day: 1.00     Years: 10.00    Smokeless tobacco: Never Used    Alcohol use No    Drug use: No    Sexual activity: Yes     Partners: Female     Birth control/ protection: None     Review of Systems   Constitutional: Negative for activity change, chills and fever.   HENT: Negative for sore throat and trouble swallowing.    Respiratory: Negative for cough and shortness of breath.    Cardiovascular: Negative for chest pain and leg swelling.   Gastrointestinal: Positive for constipation. Negative for abdominal distention, abdominal pain, diarrhea, nausea and vomiting.        Dark color stool   Genitourinary: Negative for flank pain.   Musculoskeletal: Negative for arthralgias and back pain.   Skin: Negative for color change and pallor.   Neurological: Negative for dizziness and light-headedness.   Psychiatric/Behavioral: Negative for agitation and confusion.     Objective:     Vital Signs (Most Recent):  Temp: 97.2 °F (36.2 °C) (03/08/18 0718)  Pulse: 80 (03/08/18 0800)  Resp: 16 (03/08/18 0718)  BP: (!) 148/83 (03/08/18 0718)  SpO2: (!) 93 % (03/08/18 0718)  O2 Device (Oxygen Therapy): room air (03/08/18 0718) Vital Signs (24h Range):  Temp:  [96.3 °F (35.7 °C)-99 °F (37.2 °C)] 97.2 °F (36.2 °C)  Pulse:  [] 80  Resp:  [9-20] 16  SpO2:  [93 %-100  %] 93 %  BP: (112-184)/(56-98) 148/83     Weight: 86.2 kg (190 lb) (03/07/18 0613)  Body mass index is 29.76 kg/m².  Body surface area is 2.02 meters squared.    No intake/output data recorded.    Physical Exam   Constitutional: He is oriented to person, place, and time. No distress.   HENT:   Head: Normocephalic and atraumatic.   Right Ear: External ear normal.   Left Ear: External ear normal.   Eyes: Right eye exhibits no discharge. Left eye exhibits no discharge.   Neck: No JVD present.   Cardiovascular: Normal rate and regular rhythm.    Pulmonary/Chest: Effort normal.   Abdominal: Soft. Bowel sounds are normal.   Musculoskeletal: He exhibits no edema.   Neurological: He is alert and oriented to person, place, and time.   Skin: Skin is warm and dry. He is not diaphoretic.   Psychiatric: He has a normal mood and affect. His behavior is normal.       Significant Labs:  ABGs: No results for input(s): PH, PCO2, HCO3, POCSATURATED, BE in the last 168 hours.  BMP:   Recent Labs  Lab 03/08/18  0620   GLU 85   CL 96   CO2 32*   BUN 32*   CREATININE 10.0*   CALCIUM 7.8*   MG 1.6     CBC:   Recent Labs  Lab 03/08/18  0757   WBC 6.10   RBC 2.72*   HGB 8.8*   HCT 25.5*      MCV 94   MCH 32.4*   MCHC 34.5     CMP:   Recent Labs  Lab 03/08/18  0620   GLU 85   CALCIUM 7.8*   ALBUMIN 2.7*   PROT 6.5      K 3.2*   CO2 32*   CL 96   BUN 32*   CREATININE 10.0*   ALKPHOS 147*   ALT 8*   AST 13   BILITOT 0.5     All labs within the past 24 hours have been reviewed.      Assessment/Plan:     ESRD on hemodialysis    Vaughn Retana is a 52 year old male with past medical history of ESRD on Hd, who arrived to ED for evaluation of dark color stool. Nephrology was consulted for inpatient dialysis treatment.  iHD MWF at [facility] via [facility], followed by Dr. Hubbard, duration 3.5 hrs, UF around 1 L, EDW=71.5, still has residual urine and last time he was dialyzed was on Monday.     Plan:  - Patient will have dialysis today  for clearance and volume removal, duration 3.5 hrs and UF 1-2 L as tolerated by patient, maintain MAP>65, Bath will be adjusted to chem  - Blood pressure slightly elevated which may have component of excess volume since patient is 2 kg over his dry weight.  - Anemia chronic renal disease: with a Hgb 8.8  - Mineral bone disease: with Ca=7.8, corrected with albumin 8.8, has a phosphate 4.9 which will require to continue with binders   - Diet: renal (low sodium, low phosphate and low potassium)            Nic Casey  Nephrology  Fellow  [facility]    Pager [phone] have reviewed and concur with the fellow's history, physical, assessment, and plan. I have personally interviewed and examined the patient at bedside.

## 2018-03-08 NOTE — PLAN OF CARE
Problem: Patient Care Overview  Goal: Plan of Care Review  Outcome: Ongoing (interventions implemented as appropriate)  Plan of care reviewed with patient. No distress noted at this time. Fall precautions maintained with bed in lowest position, wheels locked, and call bell within reach. Cardiac monitoring maintained. Peripheral IV saline locked. Mild nausea reported and Zofran given with moderate relief. Will continue to monitor closely.

## 2018-03-08 NOTE — SUBJECTIVE & OBJECTIVE
Past Medical History:   Diagnosis Date    Amputation stump pain 9/10/2013    Aspiration pneumonia 7/27/2015    Asterixis 11/8/2016    C. difficile colitis 8/7/2015    Cholelithiasis without obstruction 8/25/2015    Chronic diastolic heart failure     2-23-17   1 - Low normal to mildly depressed left ventricular systolic function (EF 50-55%).    2 - Right ventricular enlargement with mildly depressed systolic function.    3 - Left ventricular diastolic dysfunction.    4 - Right atrial enlargement.    5 - Severe tricuspid regurgitation.    6 - Pulmonary hypertension. The estimated PA systolic pressure is 86 mmHg.    7 - Increased central venous pressure.    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    Metabolic acidosis, IAG, reduced excretion of inorganic acids     Myoclonic jerking 9/20/2016    Secondary hyperparathyroidism (of renal origin)     Secondary pulmonary hypertension 3/23/2017    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    TB lung, latent 08/25/2015    Negative Quantiferon Gold 3-23-17       Past Surgical History:   Procedure Laterality Date    COLONOSCOPY      COLONOSCOPY N/A 4/4/2017    Procedure: COLONOSCOPY;  Surgeon: Walker Stern MD;  Location: [facility] ([address] [place] [address]);  Service: Endoscopy;  Laterality: N/A;  PA Systolic Pressure 85.56. HD Patient MWF, K+ lab prior to procedure.     FOOT AMPUTATION THROUGH METATARSAL      left foot    LEG AMPUTATION THROUGH KNEE  12/18/2013    left BKA    R AVF   9/12/12       Review of patient's allergies indicates:   Allergen Reactions    Fosrenol [lanthanum] Nausea And Vomiting     Nausea and vomiting     Current Facility-Administered Medications   Medication Frequency    0.9%  NaCl infusion PRN    0.9%  NaCl infusion Once    acetaminophen tablet 650 mg Q6H PRN    atorvastatin tablet 80 mg Daily    carvedilol tablet 25 mg BID WM    cinacalcet tablet 60 mg Daily with breakfast    dextrose 50% injection 12.5 g PRN    dextrose 50% injection 25 g PRN    glucagon (human recombinant) injection 1 mg PRN    glucose chewable tablet 16 g PRN    glucose chewable tablet 24 g PRN    hydrALAZINE tablet 50 mg Q8H PRN    insulin aspart U-100 pen 0-5 Units QID (AC + HS) PRN    lisinopril tablet 40 mg QHS    ondansetron injection 4 mg Q8H PRN    pantoprazole injection 40 mg BID    polyethylene glycol packet 17 g Daily    potassium chloride 10% solution 30 mEq Once    senna-docusate 8.6-50 mg per tablet 2 tablet Daily    sodium chloride 0.9% flush 5 mL PRN     Family History     Problem Relation (Age of Onset)    Alcohol abuse Maternal Grandmother    Diabetes Brother, Maternal Grandfather    Early death Mother    Heart disease Father    Hyperlipidemia Father    Hypertension Father, Sister    Kidney disease Father        Social History Main Topics    Smoking status: Former Smoker     Packs/day: 1.00     Years: 10.00    Smokeless tobacco: Never Used    Alcohol use No    Drug use: No    Sexual activity: Yes     Partners: Female     Birth control/ protection: None     Review of Systems   Constitutional: Negative for activity change, chills and fever.   HENT: Negative for sore throat and trouble swallowing.    Respiratory: Negative for cough and shortness of breath.    Cardiovascular: Negative for chest pain and leg swelling.   Gastrointestinal: Positive for constipation. Negative for abdominal distention, abdominal pain, diarrhea, nausea and vomiting.        Dark color  stool   Genitourinary: Negative for flank pain.   Musculoskeletal: Negative for arthralgias and back pain.   Skin: Negative for color change and pallor.   Neurological: Negative for dizziness and light-headedness.   Psychiatric/Behavioral: Negative for agitation and confusion.     Objective:     Vital Signs (Most Recent):  Temp: 97.2 °F (36.2 °C) (03/08/18 0718)  Pulse: 80 (03/08/18 0800)  Resp: 16 (03/08/18 0718)  BP: (!) 148/83 (03/08/18 0718)  SpO2: (!) 93 % (03/08/18 0718)  O2 Device (Oxygen Therapy): room air (03/08/18 0718) Vital Signs (24h Range):  Temp:  [96.3 °F (35.7 °C)-99 °F (37.2 °C)] 97.2 °F (36.2 °C)  Pulse:  [] 80  Resp:  [9-20] 16  SpO2:  [93 %-100 %] 93 %  BP: (112-184)/(56-98) 148/83     Weight: 86.2 kg (190 lb) (03/07/18 0613)  Body mass index is 29.76 kg/m².  Body surface area is 2.02 meters squared.    No intake/output data recorded.    Physical Exam   Constitutional: He is oriented to person, place, and time. No distress.   HENT:   Head: Normocephalic and atraumatic.   Right Ear: External ear normal.   Left Ear: External ear normal.   Eyes: Right eye exhibits no discharge. Left eye exhibits no discharge.   Neck: No JVD present.   Cardiovascular: Normal rate and regular rhythm.    Pulmonary/Chest: Effort normal.   Abdominal: Soft. Bowel sounds are normal.   Musculoskeletal: He exhibits no edema.   Neurological: He is alert and oriented to person, place, and time.   Skin: Skin is warm and dry. He is not diaphoretic.   Psychiatric: He has a normal mood and affect. His behavior is normal.       Significant Labs:  ABGs: No results for input(s): PH, PCO2, HCO3, POCSATURATED, BE in the last 168 hours.  BMP:   Recent Labs  Lab 03/08/18  0620   GLU 85   CL 96   CO2 32*   BUN 32*   CREATININE 10.0*   CALCIUM 7.8*   MG 1.6     CBC:   Recent Labs  Lab 03/08/18  0757   WBC 6.10   RBC 2.72*   HGB 8.8*   HCT 25.5*      MCV 94   MCH 32.4*   MCHC 34.5     CMP:   Recent Labs  Lab 03/08/18  0620    GLU 85   CALCIUM 7.8*   ALBUMIN 2.7*   PROT 6.5      K 3.2*   CO2 32*   CL 96   BUN 32*   CREATININE 10.0*   ALKPHOS 147*   ALT 8*   AST 13   BILITOT 0.5     All labs within the past 24 hours have been reviewed.

## 2018-03-08 NOTE — ASSESSMENT & PLAN NOTE
Vaughn Retana is a 52 year old male with past medical history of ESRD on Hd, who arrived to ED for evaluation of dark color stool. Nephrology was consulted for inpatient dialysis treatment.  iHD MWF at [facility] via [facility], followed by Dr. Hubbard, duration 3.5 hrs, UF around 1 L, EDW=71.5, still has residual urine and last time he was dialyzed was on Monday.     Plan:  - Patient will have dialysis today for clearance and volume removal, duration 3.5 hrs and UF 1-2 L as tolerated by patient, maintain MAP>65, Bath will be adjusted to chem  - Blood pressure slightly elevated which may have component of excess volume since patient is 2 kg over his dry weight.  - Anemia chronic renal disease: with a Hgb 8.8  - Mineral bone disease: with Ca=7.8, corrected with albumin 8.8, has a phosphate 4.9 which will require to continue with binders   - Diet: renal (low sodium, low phosphate and low potassium)

## 2018-03-09 LAB — POCT GLUCOSE: 80 MG/DL (ref 70–110)

## 2018-03-09 NOTE — PROGRESS NOTES
Pt discharged and ambulated independently off unit. VSS, NAD noted. Discharge instructions reviewed, pt verbalized understanding. IVs removed with catheters intact.

## 2018-03-15 ENCOUNTER — HOSPITAL ENCOUNTER (INPATIENT)
Facility: HOSPITAL | Age: 53
LOS: 3 days | Discharge: HOME | DRG: 391 | End: 2018-03-19
Attending: EMERGENCY MEDICINE | Admitting: INTERNAL MEDICINE
Payer: MEDICARE

## 2018-03-15 ENCOUNTER — TELEPHONE (OUTPATIENT)
Dept: TRANSPLANT | Facility: CLINIC | Age: 53
End: 2018-03-15

## 2018-03-15 DIAGNOSIS — N18.6 ESRD ON HEMODIALYSIS: Chronic | ICD-10-CM

## 2018-03-15 DIAGNOSIS — R00.0 TACHYCARDIA: ICD-10-CM

## 2018-03-15 DIAGNOSIS — Z99.2 ESRD ON HEMODIALYSIS: Chronic | ICD-10-CM

## 2018-03-15 DIAGNOSIS — K92.2 GASTROINTESTINAL HEMORRHAGE, UNSPECIFIED GASTROINTESTINAL HEMORRHAGE TYPE: Primary | ICD-10-CM

## 2018-03-15 DIAGNOSIS — K92.2 GASTROINTESTINAL HEMORRHAGE: ICD-10-CM

## 2018-03-15 LAB
ALBUMIN SERPL BCP-MCNC: 3.6 G/DL
ALP SERPL-CCNC: 165 U/L
ALT SERPL W/O P-5'-P-CCNC: 7 U/L
ANION GAP SERPL CALC-SCNC: 16 MMOL/L
AST SERPL-CCNC: 19 U/L
BASOPHILS # BLD AUTO: 0.03 K/UL
BASOPHILS NFR BLD: 0.4 %
BILIRUB SERPL-MCNC: 0.6 MG/DL
BUN SERPL-MCNC: 12 MG/DL
CALCIUM SERPL-MCNC: 8.7 MG/DL
CHLORIDE SERPL-SCNC: 90 MMOL/L
CO2 SERPL-SCNC: 31 MMOL/L
CREAT SERPL-MCNC: 7.8 MG/DL
DIFFERENTIAL METHOD: ABNORMAL
EOSINOPHIL # BLD AUTO: 0.3 K/UL
EOSINOPHIL NFR BLD: 3.5 %
ERYTHROCYTE [DISTWIDTH] IN BLOOD BY AUTOMATED COUNT: 14 %
EST. GFR  (AFRICAN AMERICAN): 8.3 ML/MIN/1.73 M^2
EST. GFR  (NON AFRICAN AMERICAN): 7.1 ML/MIN/1.73 M^2
GLUCOSE SERPL-MCNC: 99 MG/DL
HCT VFR BLD AUTO: 32.2 %
HGB BLD-MCNC: 10.9 G/DL
IMM GRANULOCYTES # BLD AUTO: 0.03 K/UL
IMM GRANULOCYTES NFR BLD AUTO: 0.4 %
INR PPP: 1.1
LIPASE SERPL-CCNC: 22 U/L
LYMPHOCYTES # BLD AUTO: 1.9 K/UL
LYMPHOCYTES NFR BLD: 23 %
MAGNESIUM SERPL-MCNC: 1.9 MG/DL
MCH RBC QN AUTO: 32.6 PG
MCHC RBC AUTO-ENTMCNC: 33.9 G/DL
MCV RBC AUTO: 96 FL
MONOCYTES # BLD AUTO: 0.9 K/UL
MONOCYTES NFR BLD: 10.7 %
NEUTROPHILS # BLD AUTO: 5 K/UL
NEUTROPHILS NFR BLD: 62 %
NRBC BLD-RTO: 0 /100 WBC
PHOSPHATE SERPL-MCNC: 3.5 MG/DL
PLATELET # BLD AUTO: 252 K/UL
PMV BLD AUTO: 10.6 FL
POTASSIUM SERPL-SCNC: 3.3 MMOL/L
PROT SERPL-MCNC: 8.6 G/DL
PROTHROMBIN TIME: 11.2 SEC
RBC # BLD AUTO: 3.34 M/UL
SODIUM SERPL-SCNC: 137 MMOL/L
WBC # BLD AUTO: 8.06 K/UL

## 2018-03-15 PROCEDURE — C9113 INJ PANTOPRAZOLE SODIUM, VIA: HCPCS | Performed by: STUDENT IN AN ORGANIZED HEALTH CARE EDUCATION/TRAINING PROGRAM

## 2018-03-15 PROCEDURE — 99285 EMERGENCY DEPT VISIT HI MDM: CPT | Mod: 25

## 2018-03-15 PROCEDURE — 93005 ELECTROCARDIOGRAM TRACING: CPT

## 2018-03-15 PROCEDURE — 93010 ELECTROCARDIOGRAM REPORT: CPT | Mod: ,,, | Performed by: INTERNAL MEDICINE

## 2018-03-15 PROCEDURE — 84100 ASSAY OF PHOSPHORUS: CPT

## 2018-03-15 PROCEDURE — 80053 COMPREHEN METABOLIC PANEL: CPT

## 2018-03-15 PROCEDURE — 99220 PR INITIAL OBSERVATION CARE,LEVL III: CPT | Mod: ,,, | Performed by: PHYSICIAN ASSISTANT

## 2018-03-15 PROCEDURE — 96375 TX/PRO/DX INJ NEW DRUG ADDON: CPT

## 2018-03-15 PROCEDURE — 85025 COMPLETE CBC W/AUTO DIFF WBC: CPT

## 2018-03-15 PROCEDURE — 25000003 PHARM REV CODE 250: Performed by: STUDENT IN AN ORGANIZED HEALTH CARE EDUCATION/TRAINING PROGRAM

## 2018-03-15 PROCEDURE — 63600175 PHARM REV CODE 636 W HCPCS: Performed by: STUDENT IN AN ORGANIZED HEALTH CARE EDUCATION/TRAINING PROGRAM

## 2018-03-15 PROCEDURE — G0378 HOSPITAL OBSERVATION PER HR: HCPCS

## 2018-03-15 PROCEDURE — C9113 INJ PANTOPRAZOLE SODIUM, VIA: HCPCS | Performed by: EMERGENCY MEDICINE

## 2018-03-15 PROCEDURE — 99284 EMERGENCY DEPT VISIT MOD MDM: CPT | Mod: ,,, | Performed by: EMERGENCY MEDICINE

## 2018-03-15 PROCEDURE — 83735 ASSAY OF MAGNESIUM: CPT

## 2018-03-15 PROCEDURE — 85610 PROTHROMBIN TIME: CPT

## 2018-03-15 PROCEDURE — 86850 RBC ANTIBODY SCREEN: CPT

## 2018-03-15 PROCEDURE — 63600175 PHARM REV CODE 636 W HCPCS: Performed by: EMERGENCY MEDICINE

## 2018-03-15 PROCEDURE — 82962 GLUCOSE BLOOD TEST: CPT

## 2018-03-15 PROCEDURE — 96374 THER/PROPH/DIAG INJ IV PUSH: CPT

## 2018-03-15 PROCEDURE — 83690 ASSAY OF LIPASE: CPT

## 2018-03-15 RX ORDER — ONDANSETRON 2 MG/ML
4 INJECTION INTRAMUSCULAR; INTRAVENOUS
Status: DISCONTINUED | OUTPATIENT
Start: 2018-03-15 | End: 2018-03-15

## 2018-03-15 RX ORDER — PANTOPRAZOLE SODIUM 40 MG/10ML
40 INJECTION, POWDER, LYOPHILIZED, FOR SOLUTION INTRAVENOUS
Status: COMPLETED | OUTPATIENT
Start: 2018-03-15 | End: 2018-03-15

## 2018-03-15 RX ORDER — PANTOPRAZOLE SODIUM 40 MG/10ML
80 INJECTION, POWDER, LYOPHILIZED, FOR SOLUTION INTRAVENOUS
Status: DISCONTINUED | OUTPATIENT
Start: 2018-03-15 | End: 2018-03-15

## 2018-03-15 RX ORDER — ONDANSETRON 2 MG/ML
4 INJECTION INTRAMUSCULAR; INTRAVENOUS
Status: COMPLETED | OUTPATIENT
Start: 2018-03-15 | End: 2018-03-15

## 2018-03-15 RX ADMIN — PANTOPRAZOLE SODIUM 40 MG: 40 INJECTION, POWDER, FOR SOLUTION INTRAVENOUS at 10:03

## 2018-03-15 RX ADMIN — ONDANSETRON HYDROCHLORIDE 4 MG: 2 INJECTION, SOLUTION INTRAMUSCULAR; INTRAVENOUS at 10:03

## 2018-03-15 RX ADMIN — ALUMINUM HYDROXIDE, MAGNESIUM HYDROXIDE, AND SIMETHICONE 50 ML: 200; 200; 20 SUSPENSION ORAL at 10:03

## 2018-03-15 NOTE — TELEPHONE ENCOUNTER
"SW contacted pt to follow up on his financial plan, caregiver plan, and mental health treatment.    Pt reports that he has his niece: Daisy Retana, Age 30, Drives, who helps and could be his caregiver. Pt reports that he will have to call back with her number. SW asked about pt's disability. Pt reports that he has an amputated leg. SW asked if pt was still having A/V Hallucinations. Pt states that he's having "a little bit" of trouble with that. SW asked about psych meds. Pt was unable to say which psych meds he was on or who prescribes them (pt's chart lists that pt takes Thorazine). SW asked if pt hs a psychiatrist. Pt reports that he believes he last say a psychiatrist in-patient at [facility], but never outpatient. SW explained that pt will need to see psych before pt can be referred for transplant. Pt states, "That'll be no problem". SW asked if there was anyone who was helping him make appointments. Pt reports that his niece Daisy makes his appointments. SW asked to be able to speak with Daisy to explain to her what pt needed to do to get re-referred. Patient verbalized agreement.     Psychosocial Suitability: Patient presents as an unacceptable candidate for [facility] at this time due to needing to establish care with a psychiatrist, obtain clearance, and continue to follow through with care with a psychiatrist. Pt will also need his caregiver plan confirmed and will need to have established adherence to dialysis and other medical recommendations.    "

## 2018-03-16 ENCOUNTER — ANESTHESIA (OUTPATIENT)
Dept: ENDOSCOPY | Facility: HOSPITAL | Age: 53
DRG: 391 | End: 2018-03-16
Payer: MEDICARE

## 2018-03-16 ENCOUNTER — TELEPHONE (OUTPATIENT)
Dept: ENDOSCOPY | Facility: HOSPITAL | Age: 53
End: 2018-03-16

## 2018-03-16 ENCOUNTER — ANESTHESIA EVENT (OUTPATIENT)
Dept: ENDOSCOPY | Facility: HOSPITAL | Age: 53
DRG: 391 | End: 2018-03-16
Payer: MEDICARE

## 2018-03-16 DIAGNOSIS — K27.9 PUD (PEPTIC ULCER DISEASE): ICD-10-CM

## 2018-03-16 DIAGNOSIS — K44.9 HIATAL HERNIA: Primary | ICD-10-CM

## 2018-03-16 DIAGNOSIS — K22.10 ESOPHAGITIS, EROSIVE: ICD-10-CM

## 2018-03-16 DIAGNOSIS — Z87.19 HISTORY OF GASTRIC POLYP: ICD-10-CM

## 2018-03-16 DIAGNOSIS — Z99.2 DIALYSIS PATIENT: ICD-10-CM

## 2018-03-16 DIAGNOSIS — K22.10 EROSIVE ESOPHAGITIS: ICD-10-CM

## 2018-03-16 DIAGNOSIS — K21.9 GASTROESOPHAGEAL REFLUX DISEASE, ESOPHAGITIS PRESENCE NOT SPECIFIED: ICD-10-CM

## 2018-03-16 PROBLEM — D72.829 LEUKOCYTOSIS: Status: RESOLVED | Noted: 2017-07-28 | Resolved: 2018-03-16

## 2018-03-16 PROBLEM — T82.858A STENOSIS OF ARTERIOVENOUS DIALYSIS FISTULA: Status: RESOLVED | Noted: 2017-05-25 | Resolved: 2018-03-16

## 2018-03-16 PROBLEM — I15.0 RENOVASCULAR HYPERTENSION: Chronic | Status: ACTIVE | Noted: 2018-03-16

## 2018-03-16 PROBLEM — E87.6 HYPOKALEMIA: Chronic | Status: ACTIVE | Noted: 2018-03-08

## 2018-03-16 PROBLEM — I15.0 RENOVASCULAR HYPERTENSION: Chronic | Status: ACTIVE | Noted: 2017-07-10

## 2018-03-16 LAB
ABO + RH BLD: NORMAL
ALBUMIN SERPL BCP-MCNC: 3.2 G/DL
ALP SERPL-CCNC: 150 U/L
ALT SERPL W/O P-5'-P-CCNC: 9 U/L
ANION GAP SERPL CALC-SCNC: 13 MMOL/L
AST SERPL-CCNC: 15 U/L
BASOPHILS # BLD AUTO: 0.04 K/UL
BASOPHILS NFR BLD: 0.6 %
BILIRUB SERPL-MCNC: 0.4 MG/DL
BLD GP AB SCN CELLS X3 SERPL QL: NORMAL
BUN SERPL-MCNC: 16 MG/DL
CALCIUM SERPL-MCNC: 8.2 MG/DL
CHLORIDE SERPL-SCNC: 91 MMOL/L
CO2 SERPL-SCNC: 34 MMOL/L
CREAT SERPL-MCNC: 8.8 MG/DL
DIFFERENTIAL METHOD: ABNORMAL
EOSINOPHIL # BLD AUTO: 0.3 K/UL
EOSINOPHIL NFR BLD: 4 %
ERYTHROCYTE [DISTWIDTH] IN BLOOD BY AUTOMATED COUNT: 14 %
EST. GFR  (AFRICAN AMERICAN): 7.1 ML/MIN/1.73 M^2
EST. GFR  (NON AFRICAN AMERICAN): 6.2 ML/MIN/1.73 M^2
ESTIMATED AVG GLUCOSE: 74 MG/DL
GLUCOSE SERPL-MCNC: 90 MG/DL
HBA1C MFR BLD HPLC: 4.2 %
HCT VFR BLD AUTO: 29.5 %
HGB BLD-MCNC: 9.8 G/DL
IMM GRANULOCYTES # BLD AUTO: 0.01 K/UL
IMM GRANULOCYTES NFR BLD AUTO: 0.1 %
LYMPHOCYTES # BLD AUTO: 2.5 K/UL
LYMPHOCYTES NFR BLD: 35.6 %
MAGNESIUM SERPL-MCNC: 2.1 MG/DL
MCH RBC QN AUTO: 31.9 PG
MCHC RBC AUTO-ENTMCNC: 33.2 G/DL
MCV RBC AUTO: 96 FL
MONOCYTES # BLD AUTO: 0.7 K/UL
MONOCYTES NFR BLD: 10.6 %
NEUTROPHILS # BLD AUTO: 3.4 K/UL
NEUTROPHILS NFR BLD: 49.1 %
NRBC BLD-RTO: 0 /100 WBC
PHOSPHATE SERPL-MCNC: 4.2 MG/DL
PLATELET # BLD AUTO: 242 K/UL
PMV BLD AUTO: 10.5 FL
POCT GLUCOSE: 101 MG/DL (ref 70–110)
POTASSIUM SERPL-SCNC: 3.1 MMOL/L
PROT SERPL-MCNC: 7.8 G/DL
RBC # BLD AUTO: 3.07 M/UL
SODIUM SERPL-SCNC: 138 MMOL/L
WBC # BLD AUTO: 6.96 K/UL

## 2018-03-16 PROCEDURE — 36415 COLL VENOUS BLD VENIPUNCTURE: CPT

## 2018-03-16 PROCEDURE — 0DB78ZX EXCISION OF STOMACH, PYLORUS, VIA NATURAL OR ARTIFICIAL OPENING ENDOSCOPIC, DIAGNOSTIC: ICD-10-PCS | Performed by: INTERNAL MEDICINE

## 2018-03-16 PROCEDURE — 43251 EGD REMOVE LESION SNARE: CPT | Mod: ,,, | Performed by: INTERNAL MEDICINE

## 2018-03-16 PROCEDURE — 88341 IMHCHEM/IMCYTCHM EA ADD ANTB: CPT | Mod: 26,,, | Performed by: PATHOLOGY

## 2018-03-16 PROCEDURE — 37000009 HC ANESTHESIA EA ADD 15 MINS: Performed by: INTERNAL MEDICINE

## 2018-03-16 PROCEDURE — 11000001 HC ACUTE MED/SURG PRIVATE ROOM

## 2018-03-16 PROCEDURE — C9113 INJ PANTOPRAZOLE SODIUM, VIA: HCPCS | Performed by: PHYSICIAN ASSISTANT

## 2018-03-16 PROCEDURE — 25000003 PHARM REV CODE 250: Performed by: PHYSICIAN ASSISTANT

## 2018-03-16 PROCEDURE — 43251 EGD REMOVE LESION SNARE: CPT | Performed by: INTERNAL MEDICINE

## 2018-03-16 PROCEDURE — D9220A PRA ANESTHESIA: Mod: ANES,,, | Performed by: ANESTHESIOLOGY

## 2018-03-16 PROCEDURE — 83735 ASSAY OF MAGNESIUM: CPT

## 2018-03-16 PROCEDURE — 63600175 PHARM REV CODE 636 W HCPCS: Performed by: NURSE ANESTHETIST, CERTIFIED REGISTERED

## 2018-03-16 PROCEDURE — 88342 IMHCHEM/IMCYTCHM 1ST ANTB: CPT | Performed by: PATHOLOGY

## 2018-03-16 PROCEDURE — 43239 EGD BIOPSY SINGLE/MULTIPLE: CPT | Performed by: INTERNAL MEDICINE

## 2018-03-16 PROCEDURE — 88305 TISSUE EXAM BY PATHOLOGIST: CPT | Mod: 26,,, | Performed by: PATHOLOGY

## 2018-03-16 PROCEDURE — 88341 IMHCHEM/IMCYTCHM EA ADD ANTB: CPT | Performed by: PATHOLOGY

## 2018-03-16 PROCEDURE — D9220A PRA ANESTHESIA: Mod: CRNA,,, | Performed by: NURSE ANESTHETIST, CERTIFIED REGISTERED

## 2018-03-16 PROCEDURE — 43239 EGD BIOPSY SINGLE/MULTIPLE: CPT | Mod: 59,,, | Performed by: INTERNAL MEDICINE

## 2018-03-16 PROCEDURE — 85025 COMPLETE CBC W/AUTO DIFF WBC: CPT

## 2018-03-16 PROCEDURE — 83036 HEMOGLOBIN GLYCOSYLATED A1C: CPT

## 2018-03-16 PROCEDURE — 84100 ASSAY OF PHOSPHORUS: CPT

## 2018-03-16 PROCEDURE — 99223 1ST HOSP IP/OBS HIGH 75: CPT | Mod: ,,, | Performed by: INTERNAL MEDICINE

## 2018-03-16 PROCEDURE — 25000003 PHARM REV CODE 250: Performed by: NURSE ANESTHETIST, CERTIFIED REGISTERED

## 2018-03-16 PROCEDURE — 99223 1ST HOSP IP/OBS HIGH 75: CPT | Mod: AI,,, | Performed by: PHYSICIAN ASSISTANT

## 2018-03-16 PROCEDURE — 88342 IMHCHEM/IMCYTCHM 1ST ANTB: CPT | Mod: 26,,, | Performed by: PATHOLOGY

## 2018-03-16 PROCEDURE — 37000008 HC ANESTHESIA 1ST 15 MINUTES: Performed by: INTERNAL MEDICINE

## 2018-03-16 PROCEDURE — 27201012 HC FORCEPS, HOT/COLD, DISP: Performed by: INTERNAL MEDICINE

## 2018-03-16 PROCEDURE — 63600175 PHARM REV CODE 636 W HCPCS: Performed by: PHYSICIAN ASSISTANT

## 2018-03-16 PROCEDURE — 27201089 HC SNARE, DISP (ANY): Performed by: INTERNAL MEDICINE

## 2018-03-16 PROCEDURE — 88305 TISSUE EXAM BY PATHOLOGIST: CPT | Performed by: PATHOLOGY

## 2018-03-16 PROCEDURE — 25000003 PHARM REV CODE 250: Performed by: NURSE PRACTITIONER

## 2018-03-16 PROCEDURE — 80053 COMPREHEN METABOLIC PANEL: CPT

## 2018-03-16 PROCEDURE — 99223 1ST HOSP IP/OBS HIGH 75: CPT | Mod: 25,GC,ICN, | Performed by: INTERNAL MEDICINE

## 2018-03-16 RX ORDER — INSULIN ASPART 100 [IU]/ML
0-5 INJECTION, SOLUTION INTRAVENOUS; SUBCUTANEOUS EVERY 6 HOURS PRN
Status: DISCONTINUED | OUTPATIENT
Start: 2018-03-16 | End: 2018-03-16

## 2018-03-16 RX ORDER — ACETAMINOPHEN 325 MG/1
650 TABLET ORAL EVERY 4 HOURS PRN
Status: DISCONTINUED | OUTPATIENT
Start: 2018-03-16 | End: 2018-03-20 | Stop reason: HOSPADM

## 2018-03-16 RX ORDER — PANTOPRAZOLE SODIUM 40 MG/1
40 TABLET, DELAYED RELEASE ORAL EVERY 12 HOURS
Qty: 60 TABLET | Refills: 5 | Status: SHIPPED | OUTPATIENT
Start: 2018-03-16 | End: 2018-03-21 | Stop reason: SDUPTHER

## 2018-03-16 RX ORDER — IBUPROFEN 200 MG
24 TABLET ORAL
Status: DISCONTINUED | OUTPATIENT
Start: 2018-03-16 | End: 2018-03-20 | Stop reason: HOSPADM

## 2018-03-16 RX ORDER — SODIUM CHLORIDE 9 MG/ML
INJECTION, SOLUTION INTRAVENOUS ONCE
Status: DISCONTINUED | OUTPATIENT
Start: 2018-03-16 | End: 2018-03-20 | Stop reason: HOSPADM

## 2018-03-16 RX ORDER — HYDRALAZINE HYDROCHLORIDE 50 MG/1
50 TABLET, FILM COATED ORAL EVERY 8 HOURS PRN
Status: DISCONTINUED | OUTPATIENT
Start: 2018-03-16 | End: 2018-03-20 | Stop reason: HOSPADM

## 2018-03-16 RX ORDER — CINACALCET 30 MG/1
60 TABLET, FILM COATED ORAL
Status: DISCONTINUED | OUTPATIENT
Start: 2018-03-16 | End: 2018-03-20 | Stop reason: HOSPADM

## 2018-03-16 RX ORDER — PROPOFOL 10 MG/ML
VIAL (ML) INTRAVENOUS CONTINUOUS PRN
Status: DISCONTINUED | OUTPATIENT
Start: 2018-03-16 | End: 2018-03-16

## 2018-03-16 RX ORDER — GLUCAGON 1 MG
1 KIT INJECTION
Status: DISCONTINUED | OUTPATIENT
Start: 2018-03-16 | End: 2018-03-20 | Stop reason: HOSPADM

## 2018-03-16 RX ORDER — LISINOPRIL 20 MG/1
40 TABLET ORAL NIGHTLY
Status: DISCONTINUED | OUTPATIENT
Start: 2018-03-16 | End: 2018-03-20 | Stop reason: HOSPADM

## 2018-03-16 RX ORDER — ONDANSETRON 2 MG/ML
4 INJECTION INTRAMUSCULAR; INTRAVENOUS EVERY 8 HOURS PRN
Status: DISCONTINUED | OUTPATIENT
Start: 2018-03-16 | End: 2018-03-20 | Stop reason: HOSPADM

## 2018-03-16 RX ORDER — PROPOFOL 10 MG/ML
VIAL (ML) INTRAVENOUS
Status: DISCONTINUED | OUTPATIENT
Start: 2018-03-16 | End: 2018-03-16

## 2018-03-16 RX ORDER — IBUPROFEN 200 MG
16 TABLET ORAL
Status: DISCONTINUED | OUTPATIENT
Start: 2018-03-16 | End: 2018-03-20 | Stop reason: HOSPADM

## 2018-03-16 RX ORDER — RAMELTEON 8 MG/1
8 TABLET ORAL NIGHTLY PRN
Status: DISCONTINUED | OUTPATIENT
Start: 2018-03-16 | End: 2018-03-20 | Stop reason: HOSPADM

## 2018-03-16 RX ORDER — GLYCOPYRROLATE 0.2 MG/ML
INJECTION INTRAMUSCULAR; INTRAVENOUS
Status: DISCONTINUED | OUTPATIENT
Start: 2018-03-16 | End: 2018-03-16

## 2018-03-16 RX ORDER — GLUCAGON 1 MG
1 KIT INJECTION
Status: DISCONTINUED | OUTPATIENT
Start: 2018-03-16 | End: 2018-03-16

## 2018-03-16 RX ORDER — SODIUM CHLORIDE 0.9 % (FLUSH) 0.9 %
5 SYRINGE (ML) INJECTION
Status: DISCONTINUED | OUTPATIENT
Start: 2018-03-16 | End: 2018-03-20 | Stop reason: HOSPADM

## 2018-03-16 RX ORDER — PANTOPRAZOLE SODIUM 40 MG/1
40 TABLET, DELAYED RELEASE ORAL
Status: DISCONTINUED | OUTPATIENT
Start: 2018-03-16 | End: 2018-03-20 | Stop reason: HOSPADM

## 2018-03-16 RX ORDER — PANTOPRAZOLE SODIUM 40 MG/10ML
40 INJECTION, POWDER, LYOPHILIZED, FOR SOLUTION INTRAVENOUS 2 TIMES DAILY
Status: DISCONTINUED | OUTPATIENT
Start: 2018-03-16 | End: 2018-03-16

## 2018-03-16 RX ORDER — CHLORPROMAZINE HYDROCHLORIDE 25 MG/1
25 TABLET, FILM COATED ORAL 3 TIMES DAILY
Status: DISCONTINUED | OUTPATIENT
Start: 2018-03-16 | End: 2018-03-20 | Stop reason: HOSPADM

## 2018-03-16 RX ORDER — CARVEDILOL 25 MG/1
25 TABLET ORAL 2 TIMES DAILY WITH MEALS
Status: DISCONTINUED | OUTPATIENT
Start: 2018-03-16 | End: 2018-03-20 | Stop reason: HOSPADM

## 2018-03-16 RX ORDER — ATORVASTATIN CALCIUM 20 MG/1
80 TABLET, FILM COATED ORAL DAILY
Status: DISCONTINUED | OUTPATIENT
Start: 2018-03-16 | End: 2018-03-20 | Stop reason: HOSPADM

## 2018-03-16 RX ORDER — AMOXICILLIN 250 MG
2 CAPSULE ORAL DAILY
Status: DISCONTINUED | OUTPATIENT
Start: 2018-03-16 | End: 2018-03-20 | Stop reason: HOSPADM

## 2018-03-16 RX ORDER — AMLODIPINE BESYLATE 10 MG/1
10 TABLET ORAL EVERY MORNING
Status: DISCONTINUED | OUTPATIENT
Start: 2018-03-16 | End: 2018-03-20 | Stop reason: HOSPADM

## 2018-03-16 RX ORDER — PANTOPRAZOLE SODIUM 40 MG/1
40 TABLET, DELAYED RELEASE ORAL DAILY
Status: DISCONTINUED | OUTPATIENT
Start: 2018-03-16 | End: 2018-03-16

## 2018-03-16 RX ORDER — ONDANSETRON 8 MG/1
8 TABLET, ORALLY DISINTEGRATING ORAL EVERY 8 HOURS PRN
Status: DISCONTINUED | OUTPATIENT
Start: 2018-03-16 | End: 2018-03-20 | Stop reason: HOSPADM

## 2018-03-16 RX ORDER — SODIUM CHLORIDE 9 MG/ML
INJECTION, SOLUTION INTRAVENOUS
Status: DISCONTINUED | OUTPATIENT
Start: 2018-03-16 | End: 2018-03-20 | Stop reason: HOSPADM

## 2018-03-16 RX ORDER — LIDOCAINE HCL/PF 100 MG/5ML
SYRINGE (ML) INTRAVENOUS
Status: DISCONTINUED | OUTPATIENT
Start: 2018-03-16 | End: 2018-03-16

## 2018-03-16 RX ORDER — IPRATROPIUM BROMIDE AND ALBUTEROL SULFATE 2.5; .5 MG/3ML; MG/3ML
3 SOLUTION RESPIRATORY (INHALATION) EVERY 4 HOURS PRN
Status: DISCONTINUED | OUTPATIENT
Start: 2018-03-16 | End: 2018-03-20 | Stop reason: HOSPADM

## 2018-03-16 RX ADMIN — CARVEDILOL 25 MG: 25 TABLET, FILM COATED ORAL at 08:03

## 2018-03-16 RX ADMIN — CINACALCET HYDROCHLORIDE 60 MG: 30 TABLET, COATED ORAL at 08:03

## 2018-03-16 RX ADMIN — ATORVASTATIN CALCIUM 80 MG: 20 TABLET, FILM COATED ORAL at 08:03

## 2018-03-16 RX ADMIN — SODIUM CHLORIDE: 0.9 INJECTION, SOLUTION INTRAVENOUS at 12:03

## 2018-03-16 RX ADMIN — LISINOPRIL 40 MG: 20 TABLET ORAL at 01:03

## 2018-03-16 RX ADMIN — PANTOPRAZOLE SODIUM 40 MG: 40 INJECTION, POWDER, FOR SOLUTION INTRAVENOUS at 09:03

## 2018-03-16 RX ADMIN — PROPOFOL 125 MCG/KG/MIN: 10 INJECTION, EMULSION INTRAVENOUS at 12:03

## 2018-03-16 RX ADMIN — ONDANSETRON 8 MG: 8 TABLET, ORALLY DISINTEGRATING ORAL at 01:03

## 2018-03-16 RX ADMIN — CHLORPROMAZINE HYDROCHLORIDE 25 MG: 25 TABLET, SUGAR COATED ORAL at 03:03

## 2018-03-16 RX ADMIN — PROPOFOL 100 MG: 10 INJECTION, EMULSION INTRAVENOUS at 12:03

## 2018-03-16 RX ADMIN — STANDARDIZED SENNA CONCENTRATE AND DOCUSATE SODIUM 2 TABLET: 8.6; 5 TABLET, FILM COATED ORAL at 08:03

## 2018-03-16 RX ADMIN — PANTOPRAZOLE SODIUM 40 MG: 40 TABLET, DELAYED RELEASE ORAL at 03:03

## 2018-03-16 RX ADMIN — AMLODIPINE BESYLATE 10 MG: 10 TABLET ORAL at 06:03

## 2018-03-16 RX ADMIN — GLYCOPYRROLATE 0.2 MG: 0.2 INJECTION, SOLUTION INTRAMUSCULAR; INTRAVENOUS at 12:03

## 2018-03-16 RX ADMIN — LIDOCAINE HYDROCHLORIDE 50 MG: 20 INJECTION, SOLUTION INTRAVENOUS at 12:03

## 2018-03-16 NOTE — PROVATION PATIENT INSTRUCTIONS
Discharge Summary/Instructions after an Endoscopic Procedure  Patient Name: Vaughn Retana  Patient MRN: [id number]  Patient YOB: 1965 Friday, March 16, 2018  Kevin De La Paz MD  RESTRICTIONS:  During your procedure today, you received medications for sedation.  These   medications may affect your judgment, balance and coordination.  Therefore,   for 24 hours, you have the following restrictions:   - DO NOT drive a car, operate machinery, make legal/financial decisions,   sign important papers or drink alcohol.    ACTIVITY:  The following day: return to full activity including work, except no heavy   lifting, straining or running for 3 days if polyps were removed.  DIET:  Eat and drink normally unless instructed otherwise.     TREATMENT FOR COMMON SIDE EFFECTS:  - Mild abdominal pain, nausea, belching, bloating or excessive gas:  rest,   eat lightly and use a heating pad.  - Sore Throat: treat with throat lozenges and/or gargle with warm salt   water.  - Because air was used during the procedure, expelling large amounts of air   from your rectum or belching is normal.  - If a bowel prep was taken, you may not have a bowel movement for 1-3 days.    This is normal.  SYMPTOMS TO WATCH FOR AND REPORT TO YOUR PHYSICIAN:  1. Abdominal pain or bloating, other than gas cramps.  2. Chest pain.  3. Back pain.  4. Signs of infection such as: chills or fever occurring within 24 hours   after the procedure.  5. Rectal bleeding, which would show as bright red, maroon, or black stools.   (A tablespoon of blood from the rectum is not serious, especially if   hemorrhoids are present.)  6. Vomiting.  7. Weakness or dizziness.  GO DIRECTLY TO THE NEAREST EMERGENCY ROOM IF YOU HAVE ANY OF THE FOLLOWING:      Difficulty breathing  Chills and/or fever over 101 F   Persistent vomiting and/or vomiting blood   Severe abdominal pain   Severe chest pain   Black, tarry stools   Bleeding- more than one tablespoon   Any other  symptom or condition that you feel may need urgent attention  Your doctor recommends these additional instructions:  If any biopsies were taken, your doctors clinic will contact you in 1 to 2   weeks with any results.  Follow an antireflux regimen indefinitely.  This includes:       - Do not lie down for at least 3 to 4 hours after meals.        - Raise the head of the bed 4 to 6 inches.        - Decrease excess weight.        - Avoid citrus juices and other acidic foods, alcohol, chocolate, mints,   coffee and other caffeinated beverages, carbonated beverages, fatty and   fried foods.        - Avoid tight-fitting clothing.        - Avoid cigarettes and other tobacco products.   Consider avoiding all non-steroidal anti-inflammatory drugs (aspirin,   ibuprofen, naproxen, etc.), unless needed for cardiovascular protection.    Recommend you discuss with your prescribing doctor (of your aspirin) to see   if cardiovascular benefits of your aspirin outweigh the risks of GI   bleeding.  Take Protonix (pantoprazole) 40 mg by mouth twice a day.   Your physician has recommended a repeat upper endoscopy in 10 weeks to check   healing.   The findings and recommendations have been discussed with you.   The findings and recommendations were discussed with your primary physician.     We are waiting for your pathology results.   Telephone your endoscopist for pathology results in two weeks.   Return to your GI clinic at the next available appointment.  For questions, problems or results please call your physician - Kevin De La Paz MD at Work:  [phone].  [facility], EMERGENCY ROOM PHONE NUMBER: [phone]  IF A COMPLICATION OR EMERGENCY SITUATION ARISES AND YOU ARE UNABLE TO REACH   YOUR PHYSICIAN - GO DIRECTLY TO THE EMERGENCY ROOM.  Kevin De La Paz MD  3/16/2018 1:13:27 PM  This report has been verified and signed electronically.

## 2018-03-16 NOTE — ASSESSMENT & PLAN NOTE
Secondary hyperparathyroidism of renal origin  Chronic kidney disease-mineral and bone disorder    - Nephrology following--- HD today   - Patient on MWF schedule.  Last HD session 3/14 with no complications.  - Continue cinacalcet 60mg daily.

## 2018-03-16 NOTE — HPI
Patient is a 52 year old gentleman with a h/o DM II, HTN, HLD, ESRD on HD, and esophagitis.  He presents with nausea and dark-colored emesis.  He also notes epigastric pain.  Patient was admitted on 3/7/3018 with similar symptoms and was diagnosed with esophagitis and started on a PPI.  He denies chest pain, SOB, dizziness, palpitations, fever/chills, diarrhea.

## 2018-03-16 NOTE — HOSPITAL COURSE
Vaughn Retana was placed in observation for evaluation for GI bleed. Gastroenterology consulted and EGD was performed on 3/16/18. Patient to follow-up with GI (awaiting pathology results) as an outpatient and continue Protonix 40mg BID daily. HD completed successfully on 3/17/18 however patient regurgitated non-digested food requiring Zofran x1. CT abdomen findings below. Remained HDS. HD completed successfully on 3/19/18 however patient with hypotension during/afer session. In the future, please hold BP medications prior to HD. Stable for discharge, follow-up 3/22 with PCP.    Imaging:    Ct Abdomen Pelvis  Without Contrast  Result Date: 3/19/2018  EXAMINATION: CT ABDOMEN PELVIS WITHOUT CONTRAST CLINICAL HISTORY: Abdominal pain, unspecified;N/V; TECHNIQUE: Low dose axial images, sagittal and coronal reformations were obtained from the lung bases to the pubic symphysis.  Water-soluble oral contrast was administered prior to image acquisition. COMPARISON: CT abdomen and pelvis with contrast 07/10/2017 FINDINGS: There is dependent bibasilar atelectatic change.  There is a calcified left lower lobe pulmonary granuloma, similar to prior examination.  Visualized portions of the heart and pericardium are within normal limits. Please note evaluation of solid organ parenchyma is limited by noncontrast technique.  The liver is normal in size and attenuation without definite focal abnormality.  There is a calcified stone within the gallbladder lumen measuring 1.6 cm.  There is no definite intra or extrahepatic biliary ductal dilatation.  The spleen, pancreas, and adrenal glands demonstrate an unremarkable noncontrast CT appearance. The kidney is are diminutive in size with bilateral renal cortical thinning and bilateral parenchymal scarring.  There are multiple renal vascular calcifications versus nonobstructing nephroliths.  There are innumerable subcentimeter renal cortical and exophytic hypodensities bilaterally, which  are too small to definitively characterize nonemergent renal ultrasound can be performed for more definitive evaluation as clinically warranted.  There is no evidence of hydronephrosis.  The prostate is prominent in size.  The urinary is suboptimally distended with circumferential wall thickening, which could be secondary to nondistention but can also be seen in the setting of cystitis or chronic outlet obstruction.  Clinical correlation advised. There is diffuse circumferential distal esophageal wall thickening, which can be seen in the setting of underlying esophagitis although underlying neoplastic etiology cannot be definitively excluded.  Correlation with EGD is recommended.  The stomach is suboptimally distended.  There is enteric contrast visualized throughout nondilated loops of large and small bowel.  The appendix is visualized and is unremarkable.  There is slight wall thickening involving the sigmoid colon, possibly relating to nondistention.  Clinical correlation for symptoms of colitis recommended.  There is no free intraperitoneal air, portal venous gas or ascites. The abdominal aorta is non aneurysmal.  With moderate aortoiliac atherosclerosis.  There is no bulky abdominopelvic adenopathy.  Visualized osseous structures demonstrate chronic changes suggestive of sequela of renal osteodystrophy.  No acute osseous abnormality identified.     1. This diffuse circumferential distal esophageal wall thickening, similar to prior CT examination. Findings can be seen in the setting of esophagitis with underlying neoplasm not definitively excluded. Correlation with EGD is recommended. 2. Findings suggestive of chronic medical renal disease with innumerable subcentimeter renal hypodensities, too small to definitively characterize on CT.   3. Colonic wall thickening involving the sigmoid colon, likely relating to nondistention although clinical correlation for symptoms of colitis is recommended.   4.  Cholelithiasis.   5. Multiple additional findings as above.

## 2018-03-16 NOTE — ASSESSMENT & PLAN NOTE
"Nephrology consult of hemodialysis. ESRD on HD MWF 3 hrs 45 mins via [facility] at [facility] under the care of Dr. Hubbard.  Unclear of EDW. Reports "little" residual. Last HD 3/14.  -K 3.1 bicarb 34 ??poor appetite and wt loss?? Low BUN with Cr 8.8  -If stable plan for HD today. Will speak to family and obtain outpatient flow sheet.      Anemia of CKD/??GIB  -Hgb 10.9>9.8  -No JONAS  -Obtain iron studies  -GI/Primary following.     BMD  Lab Results   Component Value Date    PTH 1,058.0 (H) 09/04/2016    CALCIUM 8.2 (L) 03/16/2018    CAION 1.02 (L) 09/21/2016    PHOS 4.2 03/16/2018   -Continue home sensipar and low phos diet for now.     "

## 2018-03-16 NOTE — CONSULTS
"[facility]  Nephrology  Consult Note    Patient Name: Vaughn Retana  MRN: [id number]  Admission Date: 3/15/2018  Hospital Length of Stay: 0 days  Attending Provider: LESTER Marks MD   Primary Care Physician: Yvette Zelaya NP  Principal Problem:Gastrointestinal hemorrhage    Inpatient consult to Nephrology  Consult performed by: PASCALE PUGH  Consult ordered by: AMEE MOSS  Reason for consult: esrd        Subjective:     HPI: Mazin is a 53 yo AAM with PMHx of HTN, CVA/ICH, left BKA, diastolic heart failure, DMII (hgbA1c 4.2 3/16/18), and ESRD on HD MWF who brought by sister (patient lives with niece) with nausea and dark -colored emesis and epigastric pain. Patient is a poor historian. Patient was admitted on 3/7 with similar symptoms and was told esophagitis discharged on PPI. Denies melena or hematochezia.  EGD on 3/8 showed esophagitis, sliding hiatal hernia, a single gastric polyp, normal duodenum, and plan to discuss repeat EGD to further assess the polyps in the cardia. Hgb 10.9>9.8. IN ED, positive for guaiac   GI consulted.     Nephrology consult of hemodialysis. ESRD on HD MWF 3 hrs 45 mins via [facility] at [facility] under the care of Dr. Hubbard.  Unclear of EDW. Reports "little" residual.     Past Medical History:   Diagnosis Date    Amputation stump pain 9/10/2013    Aspiration pneumonia 7/27/2015    Asterixis 11/8/2016    C. difficile colitis 8/7/2015    Cholelithiasis without obstruction 8/25/2015    Chronic diastolic heart failure     2-23-17   1 - Low normal to mildly depressed left ventricular systolic function (EF 50-55%).    2 - Right ventricular enlargement with mildly depressed systolic function.    3 - Left ventricular diastolic dysfunction.    4 - Right atrial enlargement.    5 - Severe tricuspid regurgitation.    6 - Pulmonary hypertension. The estimated PA systolic pressure is 86 mmHg.    7 - Increased central venous pressure.    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    Metabolic acidosis, IAG, reduced excretion of inorganic acids     Myoclonic jerking 9/20/2016    Secondary hyperparathyroidism (of renal origin)     Secondary pulmonary hypertension 3/23/2017    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 HD Patient MWF, K+ lab prior to procedure.     FOOT AMPUTATION THROUGH METATARSAL      left foot    LEG AMPUTATION THROUGH KNEE  12/18/2013    left BKA    R AVF  9/12/12       Review of patient's allergies indicates:   Allergen Reactions    Fosrenol [lanthanum] Nausea And Vomiting     Nausea and vomiting     Current Facility-Administered Medications   Medication Frequency    acetaminophen tablet 650 mg Q4H PRN    albuterol-ipratropium 2.5mg-0.5mg/3mL nebulizer solution 3 mL Q4H PRN    amLODIPine tablet 10 mg QAM    atorvastatin tablet 80 mg Daily    carvedilol tablet 25 mg BID WM    chlorproMAZINE tablet 25 mg TID    cinacalcet tablet 60 mg Daily with breakfast    GI cocktail (mylanta 30 mL, lidocaine 2 % viscous 10 mL, dicyclomine 10 mL) 50 mL Q6H PRN    glucagon (human recombinant) injection 1 mg PRN    glucose  chewable tablet 16 g PRN    glucose chewable tablet 24 g PRN    hydrALAZINE tablet 50 mg Q8H PRN    lisinopril tablet 40 mg QHS    ondansetron disintegrating tablet 8 mg Q8H PRN    ondansetron injection 4 mg Q8H PRN    pantoprazole 40 mg in dextrose 5 % 100 mL infusion (ready to mix system) Daily    ramelteon tablet 8 mg Nightly PRN    senna-docusate 8.6-50 mg per tablet 2 tablet Daily    sodium chloride 0.9% flush 5 mL PRN     Family History     Problem Relation (Age of Onset)    Alcohol abuse Maternal Grandmother    Diabetes Brother, Maternal Grandfather    Early death Mother    Heart disease Father    Hyperlipidemia Father    Hypertension Father, Sister    Kidney disease Father        Social History Main Topics    Smoking status: Former Smoker     Packs/day: 1.00     Years: 10.00    Smokeless tobacco: Never Used    Alcohol use No    Drug use: No    Sexual activity: Yes     Partners: Female     Birth control/ protection: None     Review of Systems   Constitutional: Positive for appetite change.   HENT: Negative.    Eyes: Negative.    Respiratory: Negative.    Cardiovascular: Negative.    Gastrointestinal: Negative.    Genitourinary: Negative.    Musculoskeletal: Negative.    Skin: Negative.    Neurological: Negative.    Hematological: Negative.    Psychiatric/Behavioral: Positive for confusion.     Objective:     Vital Signs (Most Recent):  Temp: 98.3 °F (36.8 °C) (03/16/18 0754)  Pulse: 88 (03/16/18 0754)  Resp: 12 (03/16/18 0754)  BP: (!) 146/86 (03/16/18 0754)  SpO2: 100 % (03/16/18 0754)  O2 Device (Oxygen Therapy): room air (03/16/18 0754) Vital Signs (24h Range):  Temp:  [97.4 °F (36.3 °C)-98.5 °F (36.9 °C)] 98.3 °F (36.8 °C)  Pulse:  [] 88  Resp:  [12-20] 12  SpO2:  [97 %-100 %] 100 %  BP: (124-149)/(75-97) 146/86     Weight: 68.4 kg (150 lb 12.7 oz) (03/15/18 2056)  Body mass index is 24.34 kg/m².  Body surface area is 1.78 meters squared.    No intake/output data recorded.    Physical  "Exam   Constitutional: He appears well-nourished. No distress.   HENT:   Head: Atraumatic.   Eyes: EOM are normal.   Neck: Neck supple.   Cardiovascular: Regular rhythm and normal heart sounds.    Pulmonary/Chest: Effort normal and breath sounds normal. No respiratory distress.   Abdominal: Soft. Bowel sounds are normal. He exhibits no distension.   Musculoskeletal: Normal range of motion. He exhibits deformity (left BKA ). He exhibits no edema.   Neurological: He is alert.   Oriented to self and place. Unclear date or situation.    Skin: Skin is dry.   R[facility]ARD AVF good bruit and thrill       Significant Labs:  All labs within the past 24 hours have been reviewed.    Significant Imaging:  Labs: Reviewed  reviewed    Assessment/Plan:     ESRD on hemodialysis    Nephrology consult of hemodialysis. ESRD on HD MWF 3 hrs 45 mins via [facility]G at [facility] under the care of Dr. Hubbard.  Unclear of EDW. Reports "little" residual. Last HD 3/14.  -K 3.1 bicarb 34 ??poor appetite and wt loss?? Low BUN with Cr 8.8  -If stable plan for HD today. Will speak to family and obtain outpatient flow sheet.      Anemia of CKD/??GIB  -Hgb 10.9>9.8  -No JONAS  -Obtain iron studies  -GI/Primary following.     BMD  Lab Results   Component Value Date    PTH 1,058.0 (H) 09/04/2016    CALCIUM 8.2 (L) 03/16/2018    CAION 1.02 (L) 09/21/2016    PHOS 4.2 03/16/2018   -Continue home sensipar and low phos diet for now.               Thank you for your consult. I will follow-up with patient. Please contact us if you have any additional questions.    Geetha Ramos, MARYJO  Nephrology  [facility]      I have reviewed and concur with the NP's history, physical, assessment, and plan. I have personally interviewed and examined the patient at bedside.   "

## 2018-03-16 NOTE — SUBJECTIVE & OBJECTIVE
Past Medical History:   Diagnosis Date    Amputation stump pain 9/10/2013    Aspiration pneumonia 7/27/2015    Asterixis 11/8/2016    C. difficile colitis 8/7/2015    Cholelithiasis without obstruction 8/25/2015    Chronic diastolic heart failure     2-23-17   1 - Low normal to mildly depressed left ventricular systolic function (EF 50-55%).    2 - Right ventricular enlargement with mildly depressed systolic function.    3 - Left ventricular diastolic dysfunction.    4 - Right atrial enlargement.    5 - Severe tricuspid regurgitation.    6 - Pulmonary hypertension. The estimated PA systolic pressure is 86 mmHg.    7 - Increased central venous pressure.    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    Metabolic acidosis, IAG, reduced excretion of inorganic acids     Myoclonic jerking 9/20/2016    Secondary hyperparathyroidism (of renal origin)     Secondary pulmonary hypertension 3/23/2017    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    TB lung, latent 08/25/2015    Negative Quantiferon Gold 3-23-17       Past Surgical History:   Procedure Laterality Date    COLONOSCOPY      COLONOSCOPY N/A 4/4/2017    Procedure: COLONOSCOPY;  Surgeon: Walker Stern MD;  Location: [facility] ([address] [place] [address]);  Service: Endoscopy;  Laterality: N/A;  PA Systolic Pressure 85.56. HD Patient MWF, K+ lab prior to procedure.     FOOT AMPUTATION THROUGH METATARSAL      left foot    LEG AMPUTATION THROUGH KNEE  12/18/2013    left BKA    R AVF   9/12/12       Review of patient's allergies indicates:   Allergen Reactions    Fosrenol [lanthanum] Nausea And Vomiting     Nausea and vomiting     Current Facility-Administered Medications   Medication Frequency    acetaminophen tablet 650 mg Q4H PRN    albuterol-ipratropium 2.5mg-0.5mg/3mL nebulizer solution 3 mL Q4H PRN    amLODIPine tablet 10 mg QAM    atorvastatin tablet 80 mg Daily    carvedilol tablet 25 mg BID WM    chlorproMAZINE tablet 25 mg TID    cinacalcet tablet 60 mg Daily with breakfast    GI cocktail (mylanta 30 mL, lidocaine 2 % viscous 10 mL, dicyclomine 10 mL) 50 mL Q6H PRN    glucagon (human recombinant) injection 1 mg PRN    glucose chewable tablet 16 g PRN    glucose chewable tablet 24 g PRN    hydrALAZINE tablet 50 mg Q8H PRN    lisinopril tablet 40 mg QHS    ondansetron disintegrating tablet 8 mg Q8H PRN    ondansetron injection 4 mg Q8H PRN    pantoprazole 40 mg in dextrose 5 % 100 mL infusion (ready to mix system) Daily    ramelteon tablet 8 mg Nightly PRN    senna-docusate 8.6-50 mg per tablet 2 tablet Daily    sodium chloride 0.9% flush 5 mL PRN     Family History     Problem Relation (Age of Onset)    Alcohol abuse Maternal Grandmother    Diabetes Brother, Maternal Grandfather    Early death Mother    Heart disease Father    Hyperlipidemia Father    Hypertension Father, Sister    Kidney disease Father        Social History Main Topics    Smoking status: Former Smoker     Packs/day: 1.00     Years: 10.00    Smokeless tobacco: Never Used    Alcohol use No    Drug use: No    Sexual activity: Yes     Partners: Female     Birth control/ protection: None     Review of Systems   Constitutional: Positive for appetite change.   HENT: Negative.    Eyes: Negative.    Respiratory: Negative.    Cardiovascular: Negative.    Gastrointestinal: Negative.    Genitourinary: Negative.    Musculoskeletal: Negative.    Skin: Negative.    Neurological: Negative.    Hematological:  Negative.    Psychiatric/Behavioral: Positive for confusion.     Objective:     Vital Signs (Most Recent):  Temp: 98.3 °F (36.8 °C) (03/16/18 0754)  Pulse: 88 (03/16/18 0754)  Resp: 12 (03/16/18 0754)  BP: (!) 146/86 (03/16/18 0754)  SpO2: 100 % (03/16/18 0754)  O2 Device (Oxygen Therapy): room air (03/16/18 0754) Vital Signs (24h Range):  Temp:  [97.4 °F (36.3 °C)-98.5 °F (36.9 °C)] 98.3 °F (36.8 °C)  Pulse:  [] 88  Resp:  [12-20] 12  SpO2:  [97 %-100 %] 100 %  BP: (124-149)/(75-97) 146/86     Weight: 68.4 kg (150 lb 12.7 oz) (03/15/18 2056)  Body mass index is 24.34 kg/m².  Body surface area is 1.78 meters squared.    No intake/output data recorded.    Physical Exam   Constitutional: He appears well-nourished. No distress.   HENT:   Head: Atraumatic.   Eyes: EOM are normal.   Neck: Neck supple.   Cardiovascular: Regular rhythm and normal heart sounds.    Pulmonary/Chest: Effort normal and breath sounds normal. No respiratory distress.   Abdominal: Soft. Bowel sounds are normal. He exhibits no distension.   Musculoskeletal: Normal range of motion. He exhibits deformity (left BKA ). He exhibits no edema.   Neurological: He is alert.   Oriented to self and place. Unclear date or situation.    Skin: Skin is dry.   RICHARD AVF good bruit and thrill       Significant Labs:  All labs within the past 24 hours have been reviewed.    Significant Imaging:  Labs: Reviewed  reviewed

## 2018-03-16 NOTE — PLAN OF CARE
03/16/18 1600   Discharge Assessment   Assessment Type Discharge Planning Assessment   Confirmed/corrected address and phone number on facesheet? Yes   Assessment information obtained from? Patient   Expected Length of Stay (days) 2   Communicated expected length of stay with patient/caregiver yes   Prior to hospitilization cognitive status: Alert/Oriented   Prior to hospitalization functional status: Assistive Equipment   Current cognitive status: Alert/Oriented   Current Functional Status: Needs Assistance   Lives With other relative(s)  (niece, Daisy Longoria ([phone]), & nephew Kervin Retana)   Able to Return to Prior Arrangements yes   Is patient able to care for self after discharge? Yes   Patient's perception of discharge disposition home or selfcare   Readmission Within The Last 30 Days current reason for admission unrelated to previous admission   If yes, most recent facility name[place] [facility] [place]   Patient currently being followed by outpatient case management? No   Patient currently receives any other outside agency services? No   Equipment Currently Used at Home walker, rolling;wheelchair;shower chair;prosthesis   Do you have any problems affording any of your prescribed medications? No   Is the patient taking medications as prescribed? yes   Does the patient have transportation home? Yes   Transportation Available family or friend will provide   Dialysis Name and Scheduled days [facility] (MWF [id number]) RUE fistula   Does the patient receive services at the Coumadin Clinic? No   Discharge Plan A Home with family   Discharge Plan B Home Health   Patient/Family In Agreement With Plan yes   Readmission Questionnaire   At the time of your discharge, did someone talk to you about what your health problems were? Yes   At the time of discharge, did someone talk to you about what to watch out for regarding worsening of your health problem? Yes   At the time of discharge, did someone talk to you about what to do if  you experienced worsening of your health problem? Yes   At the time of discharge, did someone talk to you about which medication to take when you left the hospital and which ones to stop taking? Yes   At the time of discharge, did someone talk to you about when and where to follow up with a doctor after you left the hospital? Yes   What do you believe caused you to be sick enough to be re-admitted? GI hemorrhage   How often do you need to have someone help you when you read instructions, pamphlets, or other written material from your doctor or pharmacy? Never   Do you have problems taking your medications as prescribed? No   Do you have any problems affording any of  your prescribed medications? No   Do you have problems obtaining/receiving your medications? No   Does the patient have transportation to healthcare appointments? Yes   Living Arrangements house   Does the patient have family/friends to help with healtcare needs after discharge? yes   Does your caregiver provide all the help you need? Yes   Are you currently feeling confused? No   Are you currently having problems thinking? No   Are you currently having memory problems? No   In the last 7 days, my sleep quality was: fair     Patient resting quietly in bed when CM rounded. No family present. Patient was admitted with GI hemorrhage. GI & neph consults noted. Patient receives HD treatment at [facility] (MWF 0500) via RUE fistula. Patient lives with his niece Daisy Longoria and nephrew Kervin Retana & has equipment to assist with ambulation. Patient LE prosthesis noted at the patient's bedside. Plan to discharge patient home with support or home with home health when medically stable. Hospital follow up appointment scheduled for the patient with MARYJO Mckeon (PCP) on 3/22/18 at 1000. Will continue to follow.

## 2018-03-16 NOTE — ANESTHESIA RELEASE NOTE
"Anesthesia Release from PACU Note    Patient: Vaughn Retana    Procedure(s) Performed: Procedure(s) (LRB):  ESOPHAGOGASTRODUODENOSCOPY (EGD) (N/A)    Anesthesia type: general    Post pain: Adequate analgesia    Post assessment: no apparent anesthetic complications    Last Vitals:   Visit Vitals  BP (!) 86/57   Pulse 73   Temp 36.3 °C (97.3 °F) (Temporal)   Resp 15   Ht 5' 6" (1.676 m)   Wt 68.4 kg (150 lb 12.7 oz)   SpO2 99%   BMI 24.34 kg/m²       Post vital signs: stable    Level of consciousness: awake    Nausea/Vomiting: no nausea/no vomiting    Complications: none    Airway Patency: patent    Respiratory: unassisted    Cardiovascular: stable and blood pressure at baseline    Hydration: euvolemic  "

## 2018-03-16 NOTE — SUBJECTIVE & OBJECTIVE
Past Medical History:   Diagnosis Date    Amputation stump pain 9/10/2013    Aspiration pneumonia 7/27/2015    Asterixis 11/8/2016    C. difficile colitis 8/7/2015    Cholelithiasis without obstruction 8/25/2015    Chronic diastolic heart failure     2-23-17   1 - Low normal to mildly depressed left ventricular systolic function (EF 50-55%).    2 - Right ventricular enlargement with mildly depressed systolic function.    3 - Left ventricular diastolic dysfunction.    4 - Right atrial enlargement.    5 - Severe tricuspid regurgitation.    6 - Pulmonary hypertension. The estimated PA systolic pressure is 86 mmHg.    7 - Increased central venous pressure.    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    Metabolic acidosis, IAG, reduced excretion of inorganic acids     Myoclonic jerking 9/20/2016    Secondary hyperparathyroidism (of renal origin)     Secondary pulmonary hypertension 3/23/2017    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    TB lung, latent 08/25/2015    Negative Quantiferon Gold 3-23-17       Past Surgical History:   Procedure Laterality Date    COLONOSCOPY      COLONOSCOPY N/A 4/4/2017    Procedure: COLONOSCOPY;  Surgeon: Walker Stern MD;  Location: [facility] ([address] [place] [address]);  Service: Endoscopy;  Laterality: N/A;  PA Systolic Pressure 85.56. HD Patient MWF, K+ lab prior to procedure.     FOOT AMPUTATION THROUGH METATARSAL      left foot    LEG AMPUTATION THROUGH KNEE  12/18/2013    left BKA    R AVF   9/12/12       Review of patient's allergies indicates:   Allergen Reactions    Fosrenol [lanthanum] Nausea And Vomiting     Nausea and vomiting       No current facility-administered medications on file prior to encounter.      Current Outpatient Prescriptions on File Prior to Encounter   Medication Sig    amLODIPine (NORVASC) 10 MG tablet Take 1 tablet (10 mg total) by mouth every morning.    atorvastatin (LIPITOR) 80 MG tablet Take 1 tablet (80 mg total) by mouth once daily.    carvedilol (COREG) 25 MG tablet Take 1 tablet (25 mg total) by mouth 2 (two) times daily with meals.    chlorproMAZINE (THORAZINE) 25 MG tablet Take 1 tablet (25 mg total) by mouth 3 (three) times daily.    cinacalcet (SENSIPAR) 60 MG Tab Take 1 tablet (60 mg total) by mouth daily with breakfast.    hydrALAZINE (APRESOLINE) 50 MG tablet Take 1 tablet (50 mg total) by mouth every 8 (eight) hours as needed (systolic blood pressure (top number) > 180).    lisinopril (PRINIVIL,ZESTRIL) 40 MG tablet Take 1 tablet (40 mg total) by mouth every evening.    ondansetron (ZOFRAN) 8 MG tablet Take 1 tablet (8 mg total) by mouth every 8 (eight) hours as needed for Nausea.    pantoprazole (PROTONIX) 40 MG tablet Take 1 tablet (40 mg total) by mouth once daily.    senna-docusate 8.6-50 mg (PERICOLACE) 8.6-50 mg per tablet Take 2 tablets by mouth once daily.     Family History     Problem Relation (Age of Onset)    Alcohol abuse Maternal Grandmother    Diabetes Brother, Maternal Grandfather    Early death Mother    Heart disease Father    Hyperlipidemia Father    Hypertension Father, Sister    Kidney disease Father        Social History Main Topics    Smoking status: Former Smoker     Packs/day: 1.00     Years: 10.00    Smokeless tobacco: Never Used    Alcohol use No    Drug use: No    Sexual activity: Yes     Partners: Female     Birth control/ protection: None     Review of Systems   Constitutional: Negative for activity change, appetite  change, chills, diaphoresis, fatigue, fever and unexpected weight change.   HENT: Negative for congestion, rhinorrhea, sore throat, trouble swallowing and voice change.    Eyes: Negative for visual disturbance.   Respiratory: Negative for cough, choking, chest tightness, shortness of breath and wheezing.    Cardiovascular: Negative for chest pain, palpitations and leg swelling.   Gastrointestinal: Positive for abdominal pain. Negative for abdominal distention, anal bleeding, blood in stool, constipation, diarrhea, nausea and vomiting.        Hematemesis   Endocrine: Negative for cold intolerance, heat intolerance, polydipsia and polyuria.   Genitourinary: Negative for dysuria, flank pain, frequency, hematuria and urgency.   Musculoskeletal: Negative for arthralgias, back pain, joint swelling and myalgias.   Skin: Negative for color change and rash.   Neurological: Negative for dizziness, seizures, syncope, facial asymmetry, speech difficulty, weakness, light-headedness, numbness and headaches.   Hematological: Negative for adenopathy. Does not bruise/bleed easily.   Psychiatric/Behavioral: Negative for agitation, confusion, hallucinations and suicidal ideas.     Objective:     Vital Signs (Most Recent):  Temp: 97.4 °F (36.3 °C) (03/16/18 0057)  Pulse: 103 (03/16/18 0057)  Resp: 18 (03/16/18 0057)  BP: 130/77 (03/16/18 0057)  SpO2: 100 % (03/16/18 0057) Vital Signs (24h Range):  Temp:  [97.4 °F (36.3 °C)-98.5 °F (36.9 °C)] 97.4 °F (36.3 °C)  Pulse:  [100-111] 103  Resp:  [18-20] 18  SpO2:  [100 %] 100 %  BP: (124-149)/(76-97) 130/77     Weight: 68.4 kg (150 lb 12.7 oz)  Body mass index is 24.34 kg/m².    Physical Exam   Constitutional: He is oriented to person, place, and time. He appears well-developed and well-nourished. No distress.   HENT:   Head: Normocephalic and atraumatic.   Neck: Neck supple. Carotid bruit is not present. No thyromegaly present.   Cardiovascular: Normal rate and regular rhythm.  Exam reveals  no gallop.    No murmur heard.  Pulmonary/Chest: Effort normal and breath sounds normal. No respiratory distress. He has no wheezes.   Abdominal: Bowel sounds are normal. He exhibits no distension. There is no splenomegaly or hepatomegaly. There is no tenderness.   Musculoskeletal: Normal range of motion. He exhibits no edema.   Neurological: He is alert and oriented to person, place, and time. No cranial nerve deficit or sensory deficit.   Skin: Skin is warm and dry. No rash noted.   Psychiatric: He has a normal mood and affect. His behavior is normal.           Significant Labs:   CBC:   Recent Labs  Lab 03/15/18  2200   WBC 8.06   HGB 10.9*   HCT 32.2*        CMP:   Recent Labs  Lab 03/15/18  2200      K 3.3*   CL 90*   CO2 31*   GLU 99   BUN 12   CREATININE 7.8*   CALCIUM 8.7   PROT 8.6*   ALBUMIN 3.6   BILITOT 0.6   ALKPHOS 165*   AST 19   ALT 7*   ANIONGAP 16   EGFRNONAA 7.1*       Significant Imaging: I have reviewed all pertinent imaging results/findings within the past 24 hours.

## 2018-03-16 NOTE — ASSESSMENT & PLAN NOTE
Esophagitis determined by endoscopy  Anemia in chronic kidney disease    EGD today per gastroenterology. NPO.  - Guaiac positive stool in ER  - Recent admission for similar symptoms with EGD on 3/8/2018 showed LA Grade B reflux esophagitis, 3cm sliding hiatal hernia, and a single gastric polyp.  - H/H stable at 10.9/32.2.  - Protonix 40mg IV daily.  - Supportive care.

## 2018-03-16 NOTE — SUBJECTIVE & OBJECTIVE
Past Medical History:   Diagnosis Date    Amputation stump pain 9/10/2013    Aspiration pneumonia 7/27/2015    Asterixis 11/8/2016    C. difficile colitis 8/7/2015    Cholelithiasis without obstruction 8/25/2015    Chronic diastolic heart failure     2-23-17   1 - Low normal to mildly depressed left ventricular systolic function (EF 50-55%).    2 - Right ventricular enlargement with mildly depressed systolic function.    3 - Left ventricular diastolic dysfunction.    4 - Right atrial enlargement.    5 - Severe tricuspid regurgitation.    6 - Pulmonary hypertension. The estimated PA systolic pressure is 86 mmHg.    7 - Increased central venous pressure.    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    Metabolic acidosis, IAG, reduced excretion of inorganic acids     Myoclonic jerking 9/20/2016    Secondary hyperparathyroidism (of renal origin)     Secondary pulmonary hypertension 3/23/2017    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    TB lung, latent 08/25/2015    Negative Quantiferon Gold 3-23-17       Past Surgical History:   Procedure Laterality Date    COLONOSCOPY      COLONOSCOPY N/A 4/4/2017    Procedure: COLONOSCOPY;  Surgeon: Walker Stern MD;  Location: [facility] ([address] [place] [address]);  Service: Endoscopy;  Laterality: N/A;  PA Systolic Pressure 85.56. HD Patient MWF, K+ lab prior to procedure.     FOOT AMPUTATION THROUGH METATARSAL      left foot    LEG AMPUTATION THROUGH KNEE  12/18/2013    left BKA    R AVF   9/12/12       Review of patient's allergies indicates:   Allergen Reactions    Fosrenol [lanthanum] Nausea And Vomiting     Nausea and vomiting     Family History     Problem Relation (Age of Onset)    Alcohol abuse Maternal Grandmother    Diabetes Brother, Maternal Grandfather    Early death Mother    Heart disease Father    Hyperlipidemia Father    Hypertension Father, Sister    Kidney disease Father        Social History Main Topics    Smoking status: Former Smoker     Packs/day: 1.00     Years: 10.00    Smokeless tobacco: Never Used    Alcohol use No    Drug use: No    Sexual activity: Yes     Partners: Female     Birth control/ protection: None     Review of Systems   Constitutional: Negative for chills and fever.   HENT: Negative for sore throat and trouble swallowing.    Respiratory: Negative for cough and shortness of breath.    Cardiovascular: Negative for chest pain and leg swelling.   Gastrointestinal: Positive for blood in stool and vomiting. Negative for abdominal pain, constipation and diarrhea.   Genitourinary: Negative for flank pain.   Musculoskeletal: Negative for arthralgias and back pain.   Skin: Negative for color change and pallor.   Neurological: Negative for dizziness and light-headedness.   Psychiatric/Behavioral: Negative for agitation and confusion.     Objective:     Vital Signs (Most Recent):  Temp: 97.3 °F (36.3 °C) (03/16/18 1250)  Pulse: 73 (03/16/18 1400)  Resp: 15 (03/16/18 1400)  BP: 93/61 (03/16/18 1400)  SpO2: 99 % (03/16/18 1400) Vital Signs (24h Range):  Temp:  [97.3 °F (36.3 °C)-98.5 °F (36.9 °C)] 97.3 °F (36.3 °C)  Pulse:  [] 73  Resp:  [12-20] 15  SpO2:  [96 %-100 %] 99 %  BP: ()/(41-97) 93/61     Weight: 68.4 kg (150 lb 12.7 oz) (03/15/18 2056)  Body mass index is 24.34 kg/m².      Intake/Output Summary (Last 24 hours) at 03/16/18 1422  Last data filed at 03/16/18 1245   Gross per 24 hour   Intake              200 ml   Output                0 ml   Net               200 ml       Lines/Drains/Airways     Drain                 Hemodialysis AV Fistula Right upper arm -- days         Hemodialysis AV Graft 01/22/11 1828 Right upper arm 2609 days         Hemodialysis AV Fistula 03/08/18 1522 Right upper arm 7 days          Peripheral Intravenous Line                 Peripheral IV - Single Lumen 03/15/18 2137 Left Hand less than 1 day         Peripheral IV - Single Lumen 03/15/18 2244 Left Antecubital less than 1 day                Physical Exam   Constitutional: He is oriented to person, place, and time. He appears well-developed and well-nourished. No distress.   HENT:   Mouth/Throat: Oropharynx is clear and moist.   Eyes: No scleral icterus.   Cardiovascular: Normal rate and regular rhythm.    Pulmonary/Chest: Effort normal and breath sounds normal.   Abdominal: Soft. Bowel sounds are normal. He exhibits no distension. There is no tenderness. There is no guarding.   Musculoskeletal: He exhibits no edema or deformity.   Lymphadenopathy:     He has no cervical adenopathy.   Neurological: He is alert and oriented to person, place, and time.   Skin: Skin is warm and dry.   Psychiatric: He has a normal mood and affect.   Vitals reviewed.      Significant Labs:  CBC:   Recent Labs  Lab 03/15/18  2200 03/16/18  0503   WBC 8.06 6.96   HGB 10.9* 9.8*   HCT 32.2* 29.5*    242     CMP:   Recent Labs  Lab 03/16/18  0503   GLU 90   CALCIUM 8.2*   ALBUMIN 3.2*   PROT 7.8      K 3.1*   CO2 34*   CL 91*   BUN 16   CREATININE 8.8*   ALKPHOS 150*   ALT 9*   AST 15   BILITOT 0.4     Coagulation:   Recent Labs  Lab 03/15/18  2244   INR 1.1

## 2018-03-16 NOTE — HPI
"Mazin is a 51 yo AAM with PMHx of HTN, CVA/ICH, left BKA, diastolic heart failure, DMII (hgbA1c 4.2 3/16/18), and ESRD on HD MWF who brought by sister (patient lives with niece) with nausea and dark -colored emesis and epigastric pain. Patient is a poor historian. Patient was admitted on 3/7 with similar symptoms and was told esophagitis discharged on PPI. Denies melena or hematochezia.  EGD on 3/8 showed esophagitis, sliding hiatal hernia, a single gastric polyp, normal duodenum, and plan to discuss repeat EGD to further assess the polyps in the cardia. Hgb 10.9>9.8. IN ED, positive for guaiac  GI consulted.     Nephrology consult of hemodialysis. ESRD on HD MWF 3 hrs 45 mins via RICHARD AVG at [facility] under the care of Dr. Hubbard.  Unclear of EDW. Reports "little" residual.   "

## 2018-03-16 NOTE — ASSESSMENT & PLAN NOTE
Secondary hyperparathyroidism of renal origin  Chronic kidney disease-mineral and bone disorder    - WIll consult nephrology.  - Patient on MWF schedule.  Last HD session Wednesday with no complications.  - Continue cinacalcet 60mg daily.

## 2018-03-16 NOTE — PROGRESS NOTES
Called Dr. Padilla to report B/P 82/57, he stated to watch patient for now and to let him know if patient not improving or worsening.

## 2018-03-16 NOTE — ED NOTES
Patient to room 13 awaiting MD assessment and orders. Patient with c/o abdominal pain and N/V intermittently since he was discharged on 3/7/18. Patient alert and oriented x 4. Placed on telemetry monitor with audible alarms. VSS at this time. Patient and family updated on plan of care. Awaiting MD orders.

## 2018-03-16 NOTE — H&P
[facility] Hospital Medicine  History & Physical    Patient Name: Vaughn Retana  MRN: [id number]  Admission Date: 3/15/2018  Attending Physician: LESTER Marks MD   Primary Care Provider: Primary Doctor [facility] Medicine Team: Networked reference to record PCT  Lou Belle PA-C     Patient information was obtained from patient, past medical records and ER records.     Subjective:     Principal Problem:Gastrointestinal hemorrhage    Chief Complaint:   Chief Complaint   Patient presents with    Nausea     NV and back pain x 4 days. unable to tolerate PO        HPI: Patient is a 52 year old gentleman with a h/o DM II, HTN, HLD, ESRD on HD, and esophagitis.  He presents with nausea and dark-colored emesis.  He also notes epigastric pain.  Patient was admitted on 3/7/3018 with similar symptoms and was diagnosed with esophagitis and started on a PPI.  He denies chest pain, SOB, dizziness, palpitations, fever/chills, diarrhea.    Past Medical History:   Diagnosis Date    Amputation stump pain 9/10/2013    Aspiration pneumonia 7/27/2015    Asterixis 11/8/2016    C. difficile colitis 8/7/2015    Cholelithiasis without obstruction 8/25/2015    Chronic diastolic heart failure     2-23-17   1 - Low normal to mildly depressed left ventricular systolic function (EF 50-55%).    2 - Right ventricular enlargement with mildly depressed systolic function.    3 - Left ventricular diastolic dysfunction.    4 - Right atrial enlargement.    5 - Severe tricuspid regurgitation.    6 - Pulmonary hypertension. The estimated PA systolic pressure is 86 mmHg.    7 - Increased central venous pressure.    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 8/1/2015    Metabolic acidosis, IAG, reduced excretion of inorganic acids     Myoclonic jerking 9/20/2016    Secondary hyperparathyroidism (of renal origin)     Secondary pulmonary hypertension 3/23/2017    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    TB lung, latent 08/25/2015    Negative Quantiferon Gold 3-23-17       Past Surgical History:   Procedure Laterality Date    COLONOSCOPY      COLONOSCOPY N/A 4/4/2017    Procedure: COLONOSCOPY;  Surgeon: Walker Stern MD;  Location: [facility] ([address] [place] [address]);  Service: Endoscopy;  Laterality: N/A;  PA Systolic Pressure 85.56. HD Patient MWF, K+ lab prior to procedure.     FOOT AMPUTATION THROUGH METATARSAL      left foot    LEG AMPUTATION THROUGH KNEE  12/18/2013    left BKA    R AVF  9/12/12       Review of patient's allergies indicates:   Allergen Reactions    Fosrenol [lanthanum] Nausea And Vomiting     Nausea and vomiting       No current facility-administered medications on file prior to encounter.      Current Outpatient Prescriptions on File Prior to Encounter   Medication Sig    amLODIPine (NORVASC) 10 MG tablet Take 1 tablet (10 mg total) by mouth every morning.    atorvastatin (LIPITOR) 80 MG tablet Take 1 tablet (80 mg total) by mouth once daily.    carvedilol (COREG) 25 MG tablet Take 1 tablet (25 mg total) by mouth 2 (two) times daily with meals.    chlorproMAZINE (THORAZINE) 25 MG tablet Take 1 tablet (25 mg total) by mouth 3 (three) times daily.    cinacalcet (SENSIPAR) 60 MG Tab Take 1 tablet (60 mg total) by mouth daily with breakfast.    hydrALAZINE (APRESOLINE) 50 MG tablet Take 1 tablet (50 mg total) by mouth every 8 (eight) hours as needed (systolic blood pressure (top number) > 180).    lisinopril  (PRINIVIL,ZESTRIL) 40 MG tablet Take 1 tablet (40 mg total) by mouth every evening.    ondansetron (ZOFRAN) 8 MG tablet Take 1 tablet (8 mg total) by mouth every 8 (eight) hours as needed for Nausea.    pantoprazole (PROTONIX) 40 MG tablet Take 1 tablet (40 mg total) by mouth once daily.    senna-docusate 8.6-50 mg (PERICOLACE) 8.6-50 mg per tablet Take 2 tablets by mouth once daily.     Family History     Problem Relation (Age of Onset)    Alcohol abuse Maternal Grandmother    Diabetes Brother, Maternal Grandfather    Early death Mother    Heart disease Father    Hyperlipidemia Father    Hypertension Father, Sister    Kidney disease Father        Social History Main Topics    Smoking status: Former Smoker     Packs/day: 1.00     Years: 10.00    Smokeless tobacco: Never Used    Alcohol use No    Drug use: No    Sexual activity: Yes     Partners: Female     Birth control/ protection: None     Review of Systems   Constitutional: Negative for activity change, appetite change, chills, diaphoresis, fatigue, fever and unexpected weight change.   HENT: Negative for congestion, rhinorrhea, sore throat, trouble swallowing and voice change.    Eyes: Negative for visual disturbance.   Respiratory: Negative for cough, choking, chest tightness, shortness of breath and wheezing.    Cardiovascular: Negative for chest pain, palpitations and leg swelling.   Gastrointestinal: Positive for abdominal pain. Negative for abdominal distention, anal bleeding, blood in stool, constipation, diarrhea, nausea and vomiting.        Hematemesis   Endocrine: Negative for cold intolerance, heat intolerance, polydipsia and polyuria.   Genitourinary: Negative for dysuria, flank pain, frequency, hematuria and urgency.   Musculoskeletal: Negative for arthralgias, back pain, joint swelling and myalgias.   Skin: Negative for color change and rash.   Neurological: Negative for dizziness, seizures, syncope, facial asymmetry, speech difficulty,  weakness, light-headedness, numbness and headaches.   Hematological: Negative for adenopathy. Does not bruise/bleed easily.   Psychiatric/Behavioral: Negative for agitation, confusion, hallucinations and suicidal ideas.     Objective:     Vital Signs (Most Recent):  Temp: 97.4 °F (36.3 °C) (03/16/18 0057)  Pulse: 103 (03/16/18 0057)  Resp: 18 (03/16/18 0057)  BP: 130/77 (03/16/18 0057)  SpO2: 100 % (03/16/18 0057) Vital Signs (24h Range):  Temp:  [97.4 °F (36.3 °C)-98.5 °F (36.9 °C)] 97.4 °F (36.3 °C)  Pulse:  [100-111] 103  Resp:  [18-20] 18  SpO2:  [100 %] 100 %  BP: (124-149)/(76-97) 130/77     Weight: 68.4 kg (150 lb 12.7 oz)  Body mass index is 24.34 kg/m².    Physical Exam   Constitutional: He is oriented to person, place, and time. He appears well-developed and well-nourished. No distress.   HENT:   Head: Normocephalic and atraumatic.   Neck: Neck supple. Carotid bruit is not present. No thyromegaly present.   Cardiovascular: Normal rate and regular rhythm.  Exam reveals no gallop.    No murmur heard.  Pulmonary/Chest: Effort normal and breath sounds normal. No respiratory distress. He has no wheezes.   Abdominal: Bowel sounds are normal. He exhibits no distension. There is no splenomegaly or hepatomegaly. There is no tenderness.   Musculoskeletal: Normal range of motion. He exhibits no edema.   Neurological: He is alert and oriented to person, place, and time. No cranial nerve deficit or sensory deficit.   Skin: Skin is warm and dry. No rash noted.   Psychiatric: He has a normal mood and affect. His behavior is normal.           Significant Labs:   CBC:   Recent Labs  Lab 03/15/18  2200   WBC 8.06   HGB 10.9*   HCT 32.2*        CMP:   Recent Labs  Lab 03/15/18  2200      K 3.3*   CL 90*   CO2 31*   GLU 99   BUN 12   CREATININE 7.8*   CALCIUM 8.7   PROT 8.6*   ALBUMIN 3.6   BILITOT 0.6   ALKPHOS 165*   AST 19   ALT 7*   ANIONGAP 16   EGFRNONAA 7.1*       Significant Imaging: I have reviewed all  pertinent imaging results/findings within the past 24 hours.    Assessment/Plan:     * Gastrointestinal hemorrhage    Esophagitis determined by endoscopy  Anemia in chronic kidney disease    - Guaiac positive stool in ER.  - Recent admission for similar symptoms with EGD on 3/8/2018 showed LA Grade B reflux esophagitis, 3cm sliding hiatal hernia, and a single gastric polyp.  - H/H stable at 10.9/32.2.  - Protonix 40mg IV daily.  - Will make NPO and consult GI.  - Supportive care.        ESRD on hemodialysis    Secondary hyperparathyroidism of renal origin  Chronic kidney disease-mineral and bone disorder    - WIll consult nephrology.  - Patient on MWF schedule.  Last HD session Wednesday with no complications.  - Continue cinacalcet 60mg daily.        Renovascular hypertension    - Continue lisinopril 40mg dialy, Coreg 25mg BID, and amlodipine 10mg daily.          Dyslipidemia    - Continue Lipitor 80mg daily.            VTE Risk Mitigation         Ordered     IP VTE HIGH RISK PATIENT  Once      03/15/18 2321     Place sequential compression device  Until discontinued      03/15/18 2321             Lou Belle PA-C  Department of Hospital Medicine   [facility]

## 2018-03-16 NOTE — HPI
This is a 51 yo M with Dm, HTN, HLD, CHF, ESRD on Hd, and hx of esophagitis presented to the ED for coffee ground emesis. Patient was just admitted 8 days ago for same complaint. He was scoped at that time which found La Grade B esophagitis and given double dose PPI. He reports compliance with medication. Notes he began having coffee ground emesis which started last night. No other symptoms.

## 2018-03-16 NOTE — H&P
[facility]  History & Physical    Subjective:      Chief Complaint/Reason for Admission:    EGd    Vaughn Retana is a 52 y.o. male.    Past Medical History:   Diagnosis Date    Amputation stump pain 9/10/2013    Aspiration pneumonia 7/27/2015    Asterixis 11/8/2016    C. difficile colitis 8/7/2015    Cholelithiasis without obstruction 8/25/2015    Chronic diastolic heart failure     2-23-17   1 - Low normal to mildly depressed left ventricular systolic function (EF 50-55%).    2 - Right ventricular enlargement with mildly depressed systolic function.    3 - Left ventricular diastolic dysfunction.    4 - Right atrial enlargement.    5 - Severe tricuspid regurgitation.    6 - Pulmonary hypertension. The estimated PA systolic pressure is 86 mmHg.    7 - Increased central venous pressure.    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    Metabolic acidosis, IAG, reduced excretion of inorganic acids     Myoclonic jerking 9/20/2016    Secondary hyperparathyroidism (of renal origin)     Secondary pulmonary hypertension 3/23/2017    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    TB lung, latent 08/25/2015    Negative Quantiferon Gold 3-23-17     Past Surgical History:   Procedure Laterality Date    COLONOSCOPY      COLONOSCOPY N/A 4/4/2017    Procedure: COLONOSCOPY;  Surgeon: Walker Stern MD;  Location: [facility] ([address] [place] [address]);  Service: Endoscopy;  Laterality: N/A;  PA Systolic Pressure 85.56. HD  Patient MWF, K+ lab prior to procedure.     FOOT AMPUTATION THROUGH METATARSAL      left foot    LEG AMPUTATION THROUGH KNEE  12/18/2013    left BKA    R AVF  9/12/12     Family History   Problem Relation Age of Onset    Early death Mother     Kidney disease Father     Hypertension Father     Heart disease Father     Hyperlipidemia Father     Diabetes Brother     Alcohol abuse Maternal Grandmother     Diabetes Maternal Grandfather     Hypertension Sister     Melanoma Neg Hx      Social History   Substance Use Topics    Smoking status: Former Smoker     Packs/day: 1.00     Years: 10.00    Smokeless tobacco: Never Used    Alcohol use No       PTA Medications   Medication Sig    amLODIPine (NORVASC) 10 MG tablet Take 1 tablet (10 mg total) by mouth every morning.    atorvastatin (LIPITOR) 80 MG tablet Take 1 tablet (80 mg total) by mouth once daily.    carvedilol (COREG) 25 MG tablet Take 1 tablet (25 mg total) by mouth 2 (two) times daily with meals.    chlorproMAZINE (THORAZINE) 25 MG tablet Take 1 tablet (25 mg total) by mouth 3 (three) times daily.    cinacalcet (SENSIPAR) 60 MG Tab Take 1 tablet (60 mg total) by mouth daily with breakfast.    hydrALAZINE (APRESOLINE) 50 MG tablet Take 1 tablet (50 mg total) by mouth every 8 (eight) hours as needed (systolic blood pressure (top number) > 180).    lisinopril (PRINIVIL,ZESTRIL) 40 MG tablet Take 1 tablet (40 mg total) by mouth every evening.    ondansetron (ZOFRAN) 8 MG tablet Take 1 tablet (8 mg total) by mouth every 8 (eight) hours as needed for Nausea.    pantoprazole (PROTONIX) 40 MG tablet Take 1 tablet (40 mg total) by mouth once daily.    senna-docusate 8.6-50 mg (PERICOLACE) 8.6-50 mg per tablet Take 2 tablets by mouth once daily.     Review of patient's allergies indicates:   Allergen Reactions    Fosrenol [lanthanum] Nausea And Vomiting     Nausea and vomiting        Review of Systems   Constitutional: Negative for chills, fever  and weight loss.   Respiratory: Negative for shortness of breath and wheezing.    Cardiovascular: Negative for chest pain.   Gastrointestinal: Positive for heartburn, nausea and vomiting.       Objective:      Vital Signs (Most Recent)  Temp: 97.3 °F (36.3 °C) (03/16/18 1121)  Pulse: 80 (03/16/18 1121)  Resp: 18 (03/16/18 1121)  BP: 93/67 (03/16/18 1121)  SpO2: 97 % (03/16/18 1121)    Vital Signs Range (Last 24H):  Temp:  [97.3 °F (36.3 °C)-98.5 °F (36.9 °C)]   Pulse:  []   Resp:  [12-20]   BP: ()/(67-97)   SpO2:  [97 %-100 %]     Physical Exam   Constitutional: He appears well-developed and well-nourished.   Cardiovascular: Normal rate.    Pulmonary/Chest: Effort normal.   Abdominal: Soft. Bowel sounds are normal.   Neurological: He is alert.   Skin: Skin is warm and dry.   Psychiatric: He has a normal mood and affect. His behavior is normal.           Assessment:      Active Hospital Problems    Diagnosis  POA    *Gastrointestinal hemorrhage [K92.2]  Yes    Renovascular hypertension [I15.0]  Yes     Chronic    Dyslipidemia [E78.5]  Yes     Chronic    ESRD on hemodialysis [N18.6, Z99.2]  Not Applicable     Chronic     MWF at [facility]        Resolved Hospital Problems    Diagnosis Date Resolved POA   No resolved problems to display.       Plan:    EGD for coffee ground emesis.

## 2018-03-16 NOTE — ASSESSMENT & PLAN NOTE
51 yo M with history of esophagitis presents with coffee ground emesis likely from unhealed esophagitis.    Plan:  - PPI IV BID  - Keep NPO  - EGD today

## 2018-03-16 NOTE — ED TRIAGE NOTES
52 year old male presents to the ED c/o nausea and vomiting. Reports recent admission to hospital for similar complaint. Had several episodes of black emesis. Had EGD done on the previous admission

## 2018-03-16 NOTE — ANESTHESIA PREPROCEDURE EVALUATION
03/16/2018  Vaughn Retana is a 52 y.o., male.    Pre-op Assessment         Review of Systems  Hematology/Oncology:        Hematology Comments: Hx of DVT   Cardiovascular:   Hypertension CHF (Chronic diastolic )    Renal/:   Chronic Renal Disease, ESRD, Dialysis    Hepatic/GI:   Esophagitis   Neurological:   CVA Hemiparesis affecting left side as late effect of stroke  Closed head injury   Endocrine:   Diabetes, type 2        Physical Exam  General:  Well nourished    Airway/Jaw/Neck:  Airway Findings: Mouth Opening: Normal Tongue: Normal  General Airway Assessment: Adult  Mallampati: I  TM Distance: Normal, at least 6 cm  Jaw/Neck Findings:  Neck ROM: Normal ROM      Dental:  Dental Findings: In tact        Mental Status:  Mental Status Findings:  Cooperative, Alert and Oriented         Anesthesia Plan  Type of Anesthesia, risks & benefits discussed:  Anesthesia Type:  general  Patient's Preference:   Intra-op Monitoring Plan: standard ASA monitors  Intra-op Monitoring Plan Comments:   Post Op Pain Control Plan:   Post Op Pain Control Plan Comments:   Induction:   IV  Beta Blocker:  Patient is on a Beta-Blocker and has received one dose within the past 24 hours (No further documentation required).       Informed Consent: Patient understands risks and agrees with Anesthesia plan.  Questions answered. Anesthesia consent signed with patient.  ASA Score: 3     Day of Surgery Review of History & Physical:    H&P update referred to the provider.         Ready For Surgery From Anesthesia Perspective.

## 2018-03-16 NOTE — PLAN OF CARE
Problem: Patient Care Overview  Goal: Plan of Care Review  Outcome: Ongoing (interventions implemented as appropriate)  Plan of care reviewed and updated. Pt AA+O. Pt's pain is managed with the medication ordered at this time. Pt's VS are as charted.  No falls this shift. Pt is oriented to room and call system. Will continue to [facility].

## 2018-03-16 NOTE — PROGRESS NOTES
[facility] Hospital Medicine  Progress Note    Patient Name: Vaughn Retana  MRN: [id number]  Patient Class: IP- Inpatient   Admission Date: 3/15/2018  Length of Stay: 0 days  Attending Physician: LESTER Marks MD  Primary Care Provider: Yvette Zelaya NP    [facility] Medicine Team: [facility]OSP MED E Rancho Carvalho PA-C    Subjective:     Principal Problem:Gastrointestinal hemorrhage    HPI:  Patient is a 52 year old gentleman with a h/o DM II, HTN, HLD, ESRD on HD, and esophagitis.  He presents with nausea and dark-colored emesis.  He also notes epigastric pain.  Patient was admitted on 3/7/3018 with similar symptoms and was diagnosed with esophagitis and started on a PPI.  He denies chest pain, SOB, dizziness, palpitations, fever/chills, diarrhea.    Hospital Course:  Vaughn Retana was placed in observation for evaluation for GI bleed. Gastroenterology consulted and patient scheduled for EGD today. NPO.    Interval History: Patient resting in bed, requesting food and drink. Procedure discussed with patient, aware that he must be NPO for now. No other complaints at this time.     Review of Systems   Constitutional: Negative for chills and fever.   HENT: Negative for congestion, rhinorrhea, sore throat, trouble swallowing and voice change.    Eyes: Negative for visual disturbance.   Respiratory: Negative for cough, choking, chest tightness, shortness of breath and wheezing.    Cardiovascular: Negative for chest pain, palpitations and leg swelling.   Gastrointestinal: Positive for blood in stool. Negative for diarrhea, nausea and vomiting.        Hematemesis   Genitourinary: Negative for dysuria, flank pain, frequency, hematuria and urgency.   Musculoskeletal: Negative for arthralgias, back pain, joint swelling and myalgias.   Skin: Negative for color change and rash.   Neurological: Negative for dizziness, speech difficulty and light-headedness.   Psychiatric/Behavioral: Positive  for confusion. Negative for agitation and suicidal ideas.     Objective:     Vital Signs (Most Recent):  Temp: 98.3 °F (36.8 °C) (03/16/18 0754)  Pulse: 88 (03/16/18 0754)  Resp: 12 (03/16/18 0754)  BP: (!) 146/86 (03/16/18 0754)  SpO2: 100 % (03/16/18 0754) Vital Signs (24h Range):  Temp:  [97.4 °F (36.3 °C)-98.5 °F (36.9 °C)] 98.3 °F (36.8 °C)  Pulse:  [] 88  Resp:  [12-20] 12  SpO2:  [97 %-100 %] 100 %  BP: (124-149)/(75-97) 146/86     Weight: 68.4 kg (150 lb 12.7 oz)  Body mass index is 24.34 kg/m².  No intake or output data in the 24 hours ending 03/16/18 1050   Physical Exam   Constitutional: He appears well-nourished. No distress.   HENT:   Head: Atraumatic.   Eyes: EOM are normal.   Neck: Neck supple.   Cardiovascular: Regular rhythm and normal heart sounds.    Pulmonary/Chest: Effort normal and breath sounds normal. No respiratory distress.   Abdominal: Soft. Bowel sounds are normal. He exhibits no distension.   Musculoskeletal: Normal range of motion. 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 He exhibits no edema.   Neurological: He is alert.   Oriented to self and place   Skin: Skin is dry.   RICHARD AVF good bruit and thrill       Significant Labs: All pertinent labs within the past 24 hours have been reviewed.    Significant Imaging: I have reviewed all pertinent imaging results/findings within the past 24 hours.    Assessment/Plan:      * Gastrointestinal hemorrhage    Esophagitis determined by endoscopy  Anemia in chronic kidney disease    EGD today per gastroenterology. NPO.  - Guaiac positive stool in ER  - Recent admission for similar symptoms with EGD on 3/8/2018 showed LA Grade B reflux esophagitis, 3cm sliding hiatal hernia, and a single gastric polyp.  - H/H stable at 10.9/32.2.  - Protonix 40mg IV daily.  - Supportive care.        Renovascular hypertension    - Continue lisinopril 40mg dialy, Coreg 25mg BID, and amlodipine 10mg daily.          ESRD on hemodialysis    Secondary  hyperparathyroidism of renal origin  Chronic kidney disease-mineral and bone disorder    - Nephrology following--- HD today   - Patient on MWF schedule.  Last HD session 3/14 with no complications.  - Continue cinacalcet 60mg daily.        Dyslipidemia    - Continue Lipitor 80mg daily.            VTE Risk Mitigation         Ordered     IP VTE HIGH RISK PATIENT  Once      03/15/18 2321     Place sequential compression device  Until discontinued      03/15/18 2321              Rancho Carvalho PA-C  Department of Hospital Medicine   [facility]

## 2018-03-16 NOTE — TRANSFER OF CARE
"Anesthesia Transfer of Care Note    Patient: Vaughn Retana    Procedure(s) Performed: Procedure(s) (LRB):  ESOPHAGOGASTRODUODENOSCOPY (EGD) (N/A)    Patient location: [facility]    Anesthesia Type: general    Transport from OR: Transported from OR on 2-3 L/min O2 by NC with adequate spontaneous ventilation    Post pain: adequate analgesia    Post assessment: no apparent anesthetic complications    Post vital signs: stable    Level of consciousness: responds to stimulation and sedated    Nausea/Vomiting: no nausea/vomiting    Complications: none    Transfer of care protocol was followed      Last vitals:   Visit Vitals  BP 93/67 (BP Location: Left arm, Patient Position: Lying)   Pulse 80   Temp 36.3 °C (97.3 °F) (Oral)   Resp 18   Ht 5' 6" (1.676 m)   Wt 68.4 kg (150 lb 12.7 oz)   SpO2 97%   BMI 24.34 kg/m²     "

## 2018-03-16 NOTE — NURSING TRANSFER
Nursing Transfer Note      3/16/2018     Transfer To: [address]    Transfer via stretcher    Transfer with none    Transported by pct    Medicines sent: none    Chart send with patient: Yes    Notified: ISMAEL Lopes    Patient reassessed at: Now and at time of arrival.    Upon arrival to floor: patient oriented to room, call bell in reach and bed in lowest position

## 2018-03-16 NOTE — ANESTHESIA POSTPROCEDURE EVALUATION
"Anesthesia Post Evaluation    Patient: Vaughn Retana    Procedure(s) Performed: Procedure(s) (LRB):  ESOPHAGOGASTRODUODENOSCOPY (EGD) (N/A)    Final Anesthesia Type: general  Patient location during evaluation: PACU  Patient participation: Yes- Able to Participate  Level of consciousness: awake and alert  Post-procedure vital signs: reviewed and stable  Pain management: adequate  Airway patency: patent  PONV status at discharge: No PONV  Anesthetic complications: no      Cardiovascular status: blood pressure returned to baseline  Respiratory status: unassisted  Hydration status: euvolemic  Follow-up not needed.        Visit Vitals  BP (!) 86/57   Pulse 73   Temp 36.3 °C (97.3 °F) (Temporal)   Resp 15   Ht 5' 6" (1.676 m)   Wt 68.4 kg (150 lb 12.7 oz)   SpO2 99%   BMI 24.34 kg/m²       Pain/Haseeb Score: Pain Assessment Performed: Yes (3/16/2018 12:50 PM)  Presence of Pain: non-verbal indicators absent (3/16/2018 12:50 PM)  Haseeb Score: 9 (3/16/2018  1:55 PM)      "

## 2018-03-16 NOTE — PLAN OF CARE
Problem: Patient Care Overview  Goal: Plan of Care Review  Outcome: Ongoing (interventions implemented as appropriate)  Patient is awake, alert and oriented. Patient remained free from complaints this shift. Patient is ambulatory. Remained free from injury and falls this shift. Bed is in the low and locked position with side rail up x 2. Call light is within reach. Informed to call if need assistance. Will continue to monitor.

## 2018-03-16 NOTE — ED NOTES
Patient given medication per orders, tolerated well. Patient has sandwich that MD ask an RN to supply patient. Patient denies any further needs at this time. Call light within reach. Awaiting results and further MD orders.

## 2018-03-16 NOTE — ASSESSMENT & PLAN NOTE
Esophagitis determined by endoscopy  Anemia in chronic kidney disease    - Guaiac positive stool in ER.  - Recent admission for similar symptoms with EGD on 3/8/2018 showed LA Grade B reflux esophagitis, 3cm sliding hiatal hernia, and a single gastric polyp.  - H/H stable at 10.9/32.2.  - Protonix 40mg IV daily.  - Will make NPO and consult GI.  - Supportive care.

## 2018-03-16 NOTE — PROGRESS NOTES
Patient arrived to unit from Endoscopy, B/P 65/41, pt placed in trendelenburg, Dr. Padilla notified, he will come assess patient.

## 2018-03-16 NOTE — SUBJECTIVE & OBJECTIVE
Interval History: Patient resting in bed, requesting food and drink. Procedure discussed with patient, aware that he must be NPO for now. No other complaints at this time.     Review of Systems   Constitutional: Negative for chills and fever.   HENT: Negative for congestion, rhinorrhea, sore throat, trouble swallowing and voice change.    Eyes: Negative for visual disturbance.   Respiratory: Negative for cough, choking, chest tightness, shortness of breath and wheezing.    Cardiovascular: Negative for chest pain, palpitations and leg swelling.   Gastrointestinal: Positive for blood in stool. Negative for diarrhea, nausea and vomiting.        Hematemesis   Genitourinary: Negative for dysuria, flank pain, frequency, hematuria and urgency.   Musculoskeletal: Negative for arthralgias, back pain, joint swelling and myalgias.   Skin: Negative for color change and rash.   Neurological: Negative for dizziness, speech difficulty and light-headedness.   Psychiatric/Behavioral: Positive for confusion. Negative for agitation and suicidal ideas.     Objective:     Vital Signs (Most Recent):  Temp: 98.3 °F (36.8 °C) (03/16/18 0754)  Pulse: 88 (03/16/18 0754)  Resp: 12 (03/16/18 0754)  BP: (!) 146/86 (03/16/18 0754)  SpO2: 100 % (03/16/18 0754) Vital Signs (24h Range):  Temp:  [97.4 °F (36.3 °C)-98.5 °F (36.9 °C)] 98.3 °F (36.8 °C)  Pulse:  [] 88  Resp:  [12-20] 12  SpO2:  [97 %-100 %] 100 %  BP: (124-149)/(75-97) 146/86     Weight: 68.4 kg (150 lb 12.7 oz)  Body mass index is 24.34 kg/m².  No intake or output data in the 24 hours ending 03/16/18 1050   Physical Exam   Constitutional: He appears well-nourished. No distress.   HENT:   Head: Atraumatic.   Eyes: EOM are normal.   Neck: Neck supple.   Cardiovascular: Regular rhythm and normal heart sounds.    Pulmonary/Chest: Effort normal and breath sounds normal. No respiratory distress.   Abdominal: Soft. Bowel sounds are normal. He exhibits no distension.   Musculoskeletal:  Normal range of motion. 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 He exhibits no edema.   Neurological: He is alert.   Oriented to self and place   Skin: Skin is dry.   RICHARD AVF good bruit and thrill       Significant Labs: All pertinent labs within the past 24 hours have been reviewed.    Significant Imaging: I have reviewed all pertinent imaging results/findings within the past 24 hours.

## 2018-03-16 NOTE — CONSULTS
[facility]  Gastroenterology  Consult Note    Patient Name: Vaughn Retana  MRN: [id number]  Admission Date: 3/15/2018  Hospital Length of Stay: 0 days  Code Status: Full Code   Attending Provider: LESTER Marks MD   Consulting Provider: Jann Valladares MD  Primary Care Physician: Yvette Zelaya NP  Principal Problem:Gastrointestinal hemorrhage    Inpatient consult to Gastroenterology  Consult performed by: JANN VALLADARES  Consult ordered by: AMEE MOSS        Subjective:     HPI:  This is a 53 yo M with Dm, HTN, HLD, CHF, ESRD on Hd, and hx of esophagitis presented to the ED for coffee ground emesis. Patient was just admitted 8 days ago for same complaint. He was scoped at that time which found La Grade B esophagitis and given double dose PPI. He reports compliance with medication. Notes he began having coffee ground emesis which started last night. No other symptoms.     Past Medical History:   Diagnosis Date    Amputation stump pain 9/10/2013    Aspiration pneumonia 7/27/2015    Asterixis 11/8/2016    C. difficile colitis 8/7/2015    Cholelithiasis without obstruction 8/25/2015    Chronic diastolic heart failure     2-23-17   1 - Low normal to mildly depressed left ventricular systolic function (EF 50-55%).    2 - Right ventricular enlargement with mildly depressed systolic function.    3 - Left ventricular diastolic dysfunction.    4 - Right atrial enlargement.    5 - Severe tricuspid regurgitation.    6 - Pulmonary hypertension. The estimated PA systolic pressure is 86 mmHg.    7 - Increased central venous pressure.    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    Metabolic acidosis, IAG, reduced excretion of inorganic acids     Myoclonic jerking 9/20/2016    Secondary hyperparathyroidism (of renal origin)     Secondary pulmonary hypertension 3/23/2017    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    TB lung, latent 08/25/2015    Negative Quantiferon Gold 3-23-17       Past Surgical History:   Procedure Laterality Date    COLONOSCOPY      COLONOSCOPY N/A 4/4/2017    Procedure: COLONOSCOPY;  Surgeon: Walker Stern MD;  Location: [facility] ([address] [place] [address]);  Service: Endoscopy;  Laterality: N/A;  PA Systolic Pressure 85.56. HD Patient MWF, K+ lab prior to procedure.     FOOT AMPUTATION THROUGH METATARSAL      left foot    LEG AMPUTATION THROUGH KNEE  12/18/2013    left BKA    R AVF  9/12/12       Review of patient's allergies indicates:   Allergen Reactions    Fosrenol [lanthanum] Nausea And Vomiting     Nausea and vomiting     Family History     Problem Relation (Age of Onset)    Alcohol abuse Maternal Grandmother    Diabetes Brother, Maternal Grandfather    Early death Mother    Heart disease Father    Hyperlipidemia Father    Hypertension Father, Sister    Kidney disease Father        Social History Main Topics    Smoking status: Former Smoker     Packs/day: 1.00     Years: 10.00    Smokeless tobacco: Never Used    Alcohol use No    Drug use: No    Sexual activity: Yes     Partners: Female     Birth control/ protection: None     Review of Systems   Constitutional: Negative for chills and fever.   HENT: Negative for sore throat and trouble swallowing.    Respiratory: Negative for cough and shortness of breath.    Cardiovascular: Negative for chest pain and leg swelling.   Gastrointestinal: Positive for blood in stool and vomiting. Negative for abdominal pain, constipation and  diarrhea.   Genitourinary: Negative for flank pain.   Musculoskeletal: Negative for arthralgias and back pain.   Skin: Negative for color change and pallor.   Neurological: Negative for dizziness and light-headedness.   Psychiatric/Behavioral: Negative for agitation and confusion.     Objective:     Vital Signs (Most Recent):  Temp: 97.3 °F (36.3 °C) (03/16/18 1250)  Pulse: 73 (03/16/18 1400)  Resp: 15 (03/16/18 1400)  BP: 93/61 (03/16/18 1400)  SpO2: 99 % (03/16/18 1400) Vital Signs (24h Range):  Temp:  [97.3 °F (36.3 °C)-98.5 °F (36.9 °C)] 97.3 °F (36.3 °C)  Pulse:  [] 73  Resp:  [12-20] 15  SpO2:  [96 %-100 %] 99 %  BP: ()/(41-97) 93/61     Weight: 68.4 kg (150 lb 12.7 oz) (03/15/18 2056)  Body mass index is 24.34 kg/m².      Intake/Output Summary (Last 24 hours) at 03/16/18 1422  Last data filed at 03/16/18 1245   Gross per 24 hour   Intake              200 ml   Output                0 ml   Net              200 ml       Lines/Drains/Airways     Drain                 Hemodialysis AV Fistula Right upper arm -- days         Hemodialysis AV Graft 01/22/11 1828 Right upper arm 2609 days         Hemodialysis AV Fistula 03/08/18 1522 Right upper arm 7 days          Peripheral Intravenous Line                 Peripheral IV - Single Lumen 03/15/18 2137 Left Hand less than 1 day         Peripheral IV - Single Lumen 03/15/18 2244 Left Antecubital less than 1 day                Physical Exam   Constitutional: He is oriented to person, place, and time. He appears well-developed and well-nourished. No distress.   HENT:   Mouth/Throat: Oropharynx is clear and moist.   Eyes: No scleral icterus.   Cardiovascular: Normal rate and regular rhythm.    Pulmonary/Chest: Effort normal and breath sounds normal.   Abdominal: Soft. Bowel sounds are normal. He exhibits no distension. There is no tenderness. There is no guarding.   Musculoskeletal: He exhibits no edema or deformity.   Lymphadenopathy:     He has no cervical  adenopathy.   Neurological: He is alert and oriented to person, place, and time.   Skin: Skin is warm and dry.   Psychiatric: He has a normal mood and affect.   Vitals reviewed.      Significant Labs:  CBC:   Recent Labs  Lab 03/15/18  2200 03/16/18  0503   WBC 8.06 6.96   HGB 10.9* 9.8*   HCT 32.2* 29.5*    242     CMP:   Recent Labs  Lab 03/16/18  0503   GLU 90   CALCIUM 8.2*   ALBUMIN 3.2*   PROT 7.8      K 3.1*   CO2 34*   CL 91*   BUN 16   CREATININE 8.8*   ALKPHOS 150*   ALT 9*   AST 15   BILITOT 0.4     Coagulation:   Recent Labs  Lab 03/15/18  2244   INR 1.1           Assessment/Plan:     * Gastrointestinal hemorrhage    51 yo M with history of esophagitis presents with coffee ground emesis likely from unhealed esophagitis.    Plan:  - PPI IV BID  - Keep NPO  - EGD today            Thank you for your consult.     Jann Onofre MD  Gastroenterology  [facility]    I was present with the fellow during the above evaluation, including history and exam.  I discussed the case with the fellow and agree with the findings and plan as documented in the fellow's note.

## 2018-03-16 NOTE — ED PROVIDER NOTES
Encounter Date: 3/15/2018       History     Chief Complaint   Patient presents with    Nausea     NV and back pain x 4 days. unable to tolerate PO     This is a 53 yo M with Dm, HTN, HLD, CHF, ESRD on Hd, and hx of esophagitis presented to the ED with nausea and reported dark colored emesis.    Pt state that his complaint is similar to his last admission and he has been nauseated and vomiting dark black emesis since 03/07/2018, c/o abdominal pain in the epigastric area, throbbing in nature, unable to tolerate orally.     In last admission 03/07/2018 he was diagnosed with esophagitis and was started on PPI.    He has a history of similar presentations in the past, last in April 2017 and one in 2014 - both times was found to have LA Grade D esophagitis.           Review of patient's allergies indicates:   Allergen Reactions    Fosrenol [lanthanum] Nausea And Vomiting     Nausea and vomiting     Past Medical History:   Diagnosis Date    Amputation stump pain 9/10/2013    Aspiration pneumonia 7/27/2015    Asterixis 11/8/2016    C. difficile colitis 8/7/2015    Cholelithiasis without obstruction 8/25/2015    Chronic diastolic heart failure     2-23-17   1 - Low normal to mildly depressed left ventricular systolic function (EF 50-55%).    2 - Right ventricular enlargement with mildly depressed systolic function.    3 - Left ventricular diastolic dysfunction.    4 - Right atrial enlargement.    5 - Severe tricuspid regurgitation.    6 - Pulmonary hypertension. The estimated PA systolic pressure is 86 mmHg.    7 - Increased central venous pressure.    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    Metabolic acidosis, IAG, reduced excretion of inorganic acids     Myoclonic jerking 9/20/2016    Secondary hyperparathyroidism (of renal origin)     Secondary pulmonary hypertension 3/23/2017    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    TB lung, latent 08/25/2015    Negative Quantiferon Gold 3-23-17     Past Surgical History:   Procedure Laterality Date    COLONOSCOPY      COLONOSCOPY N/A 4/4/2017    Procedure: COLONOSCOPY;  Surgeon: Walker Stern MD;  Location: [facility] ([address] [place] [address]);  Service: Endoscopy;  Laterality: N/A;  PA Systolic Pressure 85.56. HD Patient MWF, K+ lab prior to procedure.     FOOT AMPUTATION THROUGH METATARSAL      left foot    LEG AMPUTATION THROUGH KNEE  12/18/2013    left BKA    R AVF  9/12/12     Family History   Problem Relation Age of Onset    Early death Mother     Kidney disease Father     Hypertension Father     Heart disease Father     Hyperlipidemia Father     Diabetes Brother     Alcohol abuse Maternal Grandmother     Diabetes Maternal Grandfather     Hypertension Sister     Melanoma Neg Hx      Social History   Substance Use Topics    Smoking status: Former Smoker     Packs/day: 1.00     Years: 10.00    Smokeless tobacco: Never Used    Alcohol use No     Review of Systems   Constitutional: Positive for appetite change. Negative for chills and fever.   HENT: Negative for congestion and rhinorrhea.    Eyes: Negative for visual disturbance.   Respiratory: Negative for cough and shortness of breath.    Cardiovascular: Negative for chest pain, palpitations and leg swelling.   Gastrointestinal: Positive for abdominal pain, constipation, nausea and vomiting. Negative for anal bleeding, blood in stool, diarrhea and rectal pain.   Endocrine: Negative for polyphagia and polyuria.    Genitourinary: Negative for dysuria.   Neurological: Negative for seizures, syncope and headaches.   Psychiatric/Behavioral: Negative for agitation.       Physical Exam     Initial Vitals [03/15/18 2056]   BP Pulse Resp Temp SpO2   (!) 141/78 (!) 111 20 98.5 °F (36.9 °C) 100 %      MAP       99         Physical Exam    Constitutional: He appears well-developed and well-nourished.   HENT:   Head: Normocephalic and atraumatic.   Eyes: EOM are normal. Pupils are equal, round, and reactive to light.   Neck: Normal range of motion.   Cardiovascular: Regular rhythm.   tachycardic   Pulmonary/Chest: Breath sounds normal.   Abdominal: Soft. Bowel sounds are normal.   ALAN: positive for guaiac    Musculoskeletal: He exhibits no edema.   Left leg amputated with prosthetic leg    Neurological: He is alert and oriented to person, place, and time.   Skin: Skin is warm and dry.         ED Course   Procedures  Labs Reviewed   CBC W/ AUTO DIFFERENTIAL - Abnormal; Notable for the following:        Result Value    RBC 3.34 (*)     Hemoglobin 10.9 (*)     Hematocrit 32.2 (*)     MCH 32.6 (*)     All other components within normal limits   COMPREHENSIVE METABOLIC PANEL - Abnormal; Notable for the following:     Potassium 3.3 (*)     Chloride 90 (*)     CO2 31 (*)     Creatinine 7.8 (*)     Total Protein 8.6 (*)     Alkaline Phosphatase 165 (*)     ALT 7 (*)     eGFR if  8.3 (*)     eGFR if non  7.1 (*)     All other components within normal limits   MAGNESIUM   PHOSPHORUS   LIPASE   PROTIME-INR   TYPE & SCREEN   POCT GLUCOSE MONITORING CONTINUOUS             Medical Decision Making:   Initial Assessment:   This is a 53 y/o male with hx of ESRD, esophagitis presented with nausea and vomiting since d/c from hospital.   Differential Diagnosis:   Ddx includes but not limited to PUD, esophagitis, cholecystitis, constipation.  Clinical Tests:   Lab Tests: Ordered and Reviewed  The following lab test(s) were  unremarkable: CMP, CBC, PTT and PT  Medical Tests: Ordered and Reviewed  ED Management:  - Pt was giving antiemetics, PPI , GI cocktail.  - H&H stable   Will admit under hospiatal medicine for observation and symptoms control. + GI consult.              Attending Attestation:   Physician Attestation Statement for Resident:  As the supervising MD   Physician Attestation Statement: I have personally seen and examined this patient.   I agree with the above history. -: 51 yo m, h/o ESRD, recent admit here for upper GI bleed, Hgb trended down from 10 to 7.  EGD with esophagitis only.  Now returning for persistent hematemesis, 6x/day.  VSS, well-appearing, guaiac +.  H/H stable.  Will readmit for persistent upper GI bleed   As the supervising MD I agree with the above PE.    As the supervising MD I agree with the above treatment, course, plan, and disposition.  I have reviewed and agree with the residents interpretation of the following: lab data, x-rays and EKG.  I have reviewed the following: old records at this facility.                       Clinical Impression:   The primary encounter diagnosis was Gastrointestinal hemorrhage, unspecified gastrointestinal hemorrhage type. A diagnosis of Tachycardia was also pertinent to this visit.                           Jeane Sr MD  Resident  03/16/18 0029

## 2018-03-16 NOTE — ED NOTES
Pt. Resting safe in room. Denies any current needs. Call light within reach. Remains safe on unit.

## 2018-03-17 LAB
ALBUMIN SERPL BCP-MCNC: 3.1 G/DL
ALP SERPL-CCNC: 153 U/L
ALT SERPL W/O P-5'-P-CCNC: 7 U/L
ANION GAP SERPL CALC-SCNC: 13 MMOL/L
AST SERPL-CCNC: 15 U/L
BASOPHILS # BLD AUTO: 0.03 K/UL
BASOPHILS NFR BLD: 0.5 %
BILIRUB SERPL-MCNC: 0.4 MG/DL
BUN SERPL-MCNC: 23 MG/DL
CALCIUM SERPL-MCNC: 8.2 MG/DL
CHLORIDE SERPL-SCNC: 90 MMOL/L
CO2 SERPL-SCNC: 35 MMOL/L
CREAT SERPL-MCNC: 10.8 MG/DL
DIFFERENTIAL METHOD: ABNORMAL
EOSINOPHIL # BLD AUTO: 0.5 K/UL
EOSINOPHIL NFR BLD: 8.1 %
ERYTHROCYTE [DISTWIDTH] IN BLOOD BY AUTOMATED COUNT: 13.9 %
EST. GFR  (AFRICAN AMERICAN): 5.6 ML/MIN/1.73 M^2
EST. GFR  (NON AFRICAN AMERICAN): 4.8 ML/MIN/1.73 M^2
GLUCOSE SERPL-MCNC: 92 MG/DL
HCT VFR BLD AUTO: 27.6 %
HGB BLD-MCNC: 9.3 G/DL
IMM GRANULOCYTES # BLD AUTO: 0.01 K/UL
IMM GRANULOCYTES NFR BLD AUTO: 0.2 %
LYMPHOCYTES # BLD AUTO: 1.9 K/UL
LYMPHOCYTES NFR BLD: 28.8 %
MCH RBC QN AUTO: 32 PG
MCHC RBC AUTO-ENTMCNC: 33.7 G/DL
MCV RBC AUTO: 95 FL
MONOCYTES # BLD AUTO: 0.7 K/UL
MONOCYTES NFR BLD: 10.4 %
NEUTROPHILS # BLD AUTO: 3.5 K/UL
NEUTROPHILS NFR BLD: 52 %
NRBC BLD-RTO: 0 /100 WBC
PLATELET # BLD AUTO: 229 K/UL
PMV BLD AUTO: 10.6 FL
POTASSIUM SERPL-SCNC: 3.5 MMOL/L
PROT SERPL-MCNC: 7.5 G/DL
RBC # BLD AUTO: 2.91 M/UL
SODIUM SERPL-SCNC: 138 MMOL/L
WBC # BLD AUTO: 6.64 K/UL

## 2018-03-17 PROCEDURE — 90935 HEMODIALYSIS ONE EVALUATION: CPT

## 2018-03-17 PROCEDURE — 36415 COLL VENOUS BLD VENIPUNCTURE: CPT

## 2018-03-17 PROCEDURE — 63600175 PHARM REV CODE 636 W HCPCS: Performed by: PHYSICIAN ASSISTANT

## 2018-03-17 PROCEDURE — 25000003 PHARM REV CODE 250: Performed by: NURSE PRACTITIONER

## 2018-03-17 PROCEDURE — 99233 SBSQ HOSP IP/OBS HIGH 50: CPT | Mod: ,,, | Performed by: PHYSICIAN ASSISTANT

## 2018-03-17 PROCEDURE — 25000003 PHARM REV CODE 250: Performed by: PHYSICIAN ASSISTANT

## 2018-03-17 PROCEDURE — 85025 COMPLETE CBC W/AUTO DIFF WBC: CPT

## 2018-03-17 PROCEDURE — 80053 COMPREHEN METABOLIC PANEL: CPT

## 2018-03-17 PROCEDURE — 11000001 HC ACUTE MED/SURG PRIVATE ROOM

## 2018-03-17 PROCEDURE — 90935 HEMODIALYSIS ONE EVALUATION: CPT | Mod: ,,, | Performed by: INTERNAL MEDICINE

## 2018-03-17 RX ORDER — SODIUM CHLORIDE 9 MG/ML
INJECTION, SOLUTION INTRAVENOUS ONCE
Status: COMPLETED | OUTPATIENT
Start: 2018-03-17 | End: 2018-03-17

## 2018-03-17 RX ORDER — ONDANSETRON 2 MG/ML
4 INJECTION INTRAMUSCULAR; INTRAVENOUS EVERY 8 HOURS
Status: DISCONTINUED | OUTPATIENT
Start: 2018-03-17 | End: 2018-03-18

## 2018-03-17 RX ORDER — SODIUM CHLORIDE 9 MG/ML
INJECTION, SOLUTION INTRAVENOUS
Status: DISCONTINUED | OUTPATIENT
Start: 2018-03-17 | End: 2018-03-20 | Stop reason: HOSPADM

## 2018-03-17 RX ORDER — PANTOPRAZOLE SODIUM 40 MG/1
40 TABLET, DELAYED RELEASE ORAL
Qty: 60 TABLET | Refills: 11 | Status: ON HOLD | OUTPATIENT
Start: 2018-03-17 | End: 2019-03-07 | Stop reason: SDUPTHER

## 2018-03-17 RX ADMIN — CHLORPROMAZINE HYDROCHLORIDE 25 MG: 25 TABLET, SUGAR COATED ORAL at 10:03

## 2018-03-17 RX ADMIN — ONDANSETRON HYDROCHLORIDE 4 MG: 2 INJECTION, SOLUTION INTRAMUSCULAR; INTRAVENOUS at 10:03

## 2018-03-17 RX ADMIN — STANDARDIZED SENNA CONCENTRATE AND DOCUSATE SODIUM 2 TABLET: 8.6; 5 TABLET, FILM COATED ORAL at 09:03

## 2018-03-17 RX ADMIN — LISINOPRIL 40 MG: 20 TABLET ORAL at 10:03

## 2018-03-17 RX ADMIN — ATORVASTATIN CALCIUM 80 MG: 20 TABLET, FILM COATED ORAL at 09:03

## 2018-03-17 RX ADMIN — CHLORPROMAZINE HYDROCHLORIDE 25 MG: 25 TABLET, SUGAR COATED ORAL at 11:03

## 2018-03-17 RX ADMIN — SODIUM CHLORIDE 350 ML: 0.9 INJECTION, SOLUTION INTRAVENOUS at 05:03

## 2018-03-17 RX ADMIN — PANTOPRAZOLE SODIUM 40 MG: 40 TABLET, DELAYED RELEASE ORAL at 06:03

## 2018-03-17 RX ADMIN — CINACALCET HYDROCHLORIDE 60 MG: 30 TABLET, COATED ORAL at 10:03

## 2018-03-17 RX ADMIN — AMLODIPINE BESYLATE 10 MG: 10 TABLET ORAL at 06:03

## 2018-03-17 RX ADMIN — ONDANSETRON 4 MG: 2 INJECTION INTRAMUSCULAR; INTRAVENOUS at 03:03

## 2018-03-17 NOTE — PLAN OF CARE
Transportation scheduled with [employer], [phone].   time 7pm.    Leyda Sparks LMSW

## 2018-03-17 NOTE — PROGRESS NOTES
"HD today; 1L removed. Suppose to DC today but was nauseous and throwing up "what appears to be undigested food" per HD Rn. Needed zofran x1. Transport Dcd for now- plan to scan pts abd.   "

## 2018-03-17 NOTE — PROGRESS NOTES
Dialysis completed. Needles removed from right upper arm fistula with pressure held to sites for 7 minutes each with hemostasis achieved. Gauze and tape to sites. Patient dialyzed for 3.5 hours with fluid removal of 1 liter. Tolerated well with stable vital signs.

## 2018-03-17 NOTE — PROGRESS NOTES
Patient received from [facility] with report from ISMAEL Wiggins. Maintenance dialysis began per orders via 15 gauge fistula needles to right upper arm fistula.

## 2018-03-17 NOTE — DISCHARGE INSTRUCTIONS
Gastroenterology instructions are as follows:                        - Follow an antireflux regimen indefinitely.                        - Consider avoiding all non-steroidal                         anti-inflammatory drugs (aspirin, ibuprofen,                         naproxen, etc.), unless needed for cardiovascular                         protection. Recommend you discuss with your                         prescribing doctor (of your aspirin) to see if                         cardiovascular benefits of your aspirin outweigh                         the risks of GI bleeding.                        - Use Protonix (pantoprazole) 40 mg by mouth every                         12 hours.                        - Repeat upper endoscopy in 8-10 weeks to check                         healing.                        - The findings and recommendations were discussed                         with the patient.                        - The findings and recommendations were discussed                         with the patient's primary physician.                        - Await pathology results.                        - Telephone endoscopist for pathology results in 2                         weeks.                        - Return to GI clinic at the next available                         appointment.                        - The findings and recommendations were discussed                         with the patient's family.

## 2018-03-18 PROBLEM — R10.9 ABDOMINAL PAIN: Status: ACTIVE | Noted: 2018-03-18

## 2018-03-18 LAB
ALBUMIN SERPL BCP-MCNC: 3 G/DL
ALP SERPL-CCNC: 146 U/L
ALT SERPL W/O P-5'-P-CCNC: 8 U/L
ANION GAP SERPL CALC-SCNC: 10 MMOL/L
AST SERPL-CCNC: 14 U/L
BASOPHILS # BLD AUTO: 0.02 K/UL
BASOPHILS NFR BLD: 0.3 %
BILIRUB SERPL-MCNC: 0.3 MG/DL
BUN SERPL-MCNC: 11 MG/DL
CALCIUM SERPL-MCNC: 8 MG/DL
CHLORIDE SERPL-SCNC: 106 MMOL/L
CO2 SERPL-SCNC: 23 MMOL/L
CREAT SERPL-MCNC: 6.9 MG/DL
DIFFERENTIAL METHOD: ABNORMAL
EOSINOPHIL # BLD AUTO: 0.7 K/UL
EOSINOPHIL NFR BLD: 10.5 %
ERYTHROCYTE [DISTWIDTH] IN BLOOD BY AUTOMATED COUNT: 13.8 %
EST. GFR  (AFRICAN AMERICAN): 9.6 ML/MIN/1.73 M^2
EST. GFR  (NON AFRICAN AMERICAN): 8.3 ML/MIN/1.73 M^2
GLUCOSE SERPL-MCNC: 119 MG/DL
HCT VFR BLD AUTO: 28.7 %
HGB BLD-MCNC: 9.5 G/DL
IMM GRANULOCYTES # BLD AUTO: 0.03 K/UL
IMM GRANULOCYTES NFR BLD AUTO: 0.4 %
LYMPHOCYTES # BLD AUTO: 1.9 K/UL
LYMPHOCYTES NFR BLD: 27.6 %
MCH RBC QN AUTO: 32.3 PG
MCHC RBC AUTO-ENTMCNC: 33.1 G/DL
MCV RBC AUTO: 98 FL
MONOCYTES # BLD AUTO: 0.7 K/UL
MONOCYTES NFR BLD: 9.9 %
NEUTROPHILS # BLD AUTO: 3.5 K/UL
NEUTROPHILS NFR BLD: 51.3 %
NRBC BLD-RTO: 0 /100 WBC
PLATELET # BLD AUTO: 229 K/UL
PMV BLD AUTO: 10.6 FL
POTASSIUM SERPL-SCNC: 4.2 MMOL/L
PROT SERPL-MCNC: 7.6 G/DL
RBC # BLD AUTO: 2.94 M/UL
SODIUM SERPL-SCNC: 139 MMOL/L
WBC # BLD AUTO: 6.88 K/UL

## 2018-03-18 PROCEDURE — 25500020 PHARM REV CODE 255: Performed by: PHYSICIAN ASSISTANT

## 2018-03-18 PROCEDURE — 63600175 PHARM REV CODE 636 W HCPCS: Performed by: PHYSICIAN ASSISTANT

## 2018-03-18 PROCEDURE — 11000001 HC ACUTE MED/SURG PRIVATE ROOM

## 2018-03-18 PROCEDURE — 94761 N-INVAS EAR/PLS OXIMETRY MLT: CPT

## 2018-03-18 PROCEDURE — 36415 COLL VENOUS BLD VENIPUNCTURE: CPT

## 2018-03-18 PROCEDURE — 99233 SBSQ HOSP IP/OBS HIGH 50: CPT | Mod: ,,, | Performed by: PHYSICIAN ASSISTANT

## 2018-03-18 PROCEDURE — 85025 COMPLETE CBC W/AUTO DIFF WBC: CPT

## 2018-03-18 PROCEDURE — 25000003 PHARM REV CODE 250: Performed by: PHYSICIAN ASSISTANT

## 2018-03-18 PROCEDURE — 80053 COMPREHEN METABOLIC PANEL: CPT

## 2018-03-18 RX ORDER — METOCLOPRAMIDE 10 MG/1
10 TABLET ORAL
Status: DISCONTINUED | OUTPATIENT
Start: 2018-03-18 | End: 2018-03-20 | Stop reason: HOSPADM

## 2018-03-18 RX ORDER — POLYETHYLENE GLYCOL 3350 17 G/17G
17 POWDER, FOR SOLUTION ORAL 2 TIMES DAILY
Status: DISCONTINUED | OUTPATIENT
Start: 2018-03-18 | End: 2018-03-20 | Stop reason: HOSPADM

## 2018-03-18 RX ADMIN — ATORVASTATIN CALCIUM 80 MG: 20 TABLET, FILM COATED ORAL at 09:03

## 2018-03-18 RX ADMIN — LISINOPRIL 40 MG: 20 TABLET ORAL at 08:03

## 2018-03-18 RX ADMIN — ONDANSETRON HYDROCHLORIDE 4 MG: 2 INJECTION, SOLUTION INTRAMUSCULAR; INTRAVENOUS at 06:03

## 2018-03-18 RX ADMIN — METOCLOPRAMIDE 10 MG: 10 TABLET ORAL at 06:03

## 2018-03-18 RX ADMIN — CARVEDILOL 25 MG: 25 TABLET, FILM COATED ORAL at 06:03

## 2018-03-18 RX ADMIN — CHLORPROMAZINE HYDROCHLORIDE 25 MG: 25 TABLET, SUGAR COATED ORAL at 09:03

## 2018-03-18 RX ADMIN — IOHEXOL 15 ML: 350 INJECTION, SOLUTION INTRAVENOUS at 08:03

## 2018-03-18 RX ADMIN — PANTOPRAZOLE SODIUM 40 MG: 40 TABLET, DELAYED RELEASE ORAL at 06:03

## 2018-03-18 RX ADMIN — CHLORPROMAZINE HYDROCHLORIDE 25 MG: 25 TABLET, SUGAR COATED ORAL at 02:03

## 2018-03-18 RX ADMIN — CHLORPROMAZINE HYDROCHLORIDE 25 MG: 25 TABLET, SUGAR COATED ORAL at 08:03

## 2018-03-18 RX ADMIN — CINACALCET HYDROCHLORIDE 60 MG: 30 TABLET, COATED ORAL at 09:03

## 2018-03-18 RX ADMIN — STANDARDIZED SENNA CONCENTRATE AND DOCUSATE SODIUM 2 TABLET: 8.6; 5 TABLET, FILM COATED ORAL at 09:03

## 2018-03-18 NOTE — ASSESSMENT & PLAN NOTE
Esophagitis determined by endoscopy  Anemia in chronic kidney disease  HDS  EGD completed 3/17 Gastroenterology instructions are as follows:   - Follow an antireflux regimen indefinitely.  - Consider avoiding all non-steroidal anti-inflammatory drugs (aspirin, ibuprofen,  naproxen, etc.), unless needed for cardiovascular protection. Recommend you discuss with your prescribing doctor (of your aspirin) to see if cardiovascular benefits of your aspirin outweigh he risks of GI bleeding.   - Use Protonix (pantoprazole) 40 mg BID   - Repeat upper endoscopy in 8-10 weeks to check healing.  - Guaiac positive stool in ER  - Recent admission for similar symptoms with EGD on 3/8/2018 showed LA Grade B reflux esophagitis, 3cm sliding hiatal hernia, and a single gastric polyp.  - H/H stable at 10.9/32.2.  - Protonix 40mg IV daily.  - Supportive care.

## 2018-03-18 NOTE — ASSESSMENT & PLAN NOTE
Esophagitis determined by endoscopy  Anemia in chronic kidney disease    EGD completed 3/17  Gastroenterology instructions are as follows:   - Follow an antireflux regimen indefinitely.  - Consider avoiding all non-steroidal anti-inflammatory drugs (aspirin, ibuprofen,  naproxen, etc.), unless needed for cardiovascular protection. Recommend you discuss with your prescribing doctor (of your aspirin) to see if cardiovascular benefits of your aspirin outweigh he risks of GI bleeding.   - Use Protonix (pantoprazole) 40 mg BID   - Repeat upper endoscopy in 8-10 weeks to check healing.  - Guaiac positive stool in ER  - Recent admission for similar symptoms with EGD on 3/8/2018 showed LA Grade B reflux esophagitis, 3cm sliding hiatal hernia, and a single gastric polyp.  - H/H stable at 10.9/32.2.  - Protonix 40mg IV daily.  - Supportive care.

## 2018-03-18 NOTE — SUBJECTIVE & OBJECTIVE
Interval History: Vomited during HD today.      Review of Systems   Constitutional: Negative for chills and fever.   HENT: Negative for congestion, rhinorrhea, sore throat, trouble swallowing and voice change.    Eyes: Negative for visual disturbance.   Respiratory: Negative for cough, choking, chest tightness, shortness of breath and wheezing.    Cardiovascular: Negative for chest pain, palpitations and leg swelling.   Gastrointestinal: Positive for blood in stool, nausea and vomiting. Negative for diarrhea.        Hematemesis   Genitourinary: Negative for dysuria, flank pain, frequency, hematuria and urgency.   Musculoskeletal: Negative for arthralgias, back pain, joint swelling and myalgias.   Skin: Negative for color change and rash.   Neurological: Negative for dizziness, speech difficulty and light-headedness.   Psychiatric/Behavioral: Positive for confusion. Negative for agitation and suicidal ideas.     Objective:     Vital Signs (Most Recent):  Temp: 98 °F (36.7 °C) (03/17/18 1745)  Pulse: 84 (03/17/18 1745)  Resp: 18 (03/17/18 1745)  BP: 131/86 (03/17/18 1745)  SpO2: 100 % (03/17/18 1745) Vital Signs (24h Range):  Temp:  [97.1 °F (36.2 °C)-98.3 °F (36.8 °C)] 98 °F (36.7 °C)  Pulse:  [72-90] 84  Resp:  [16-18] 18  SpO2:  [98 %-100 %] 100 %  BP: ()/(58-90) 131/86     Weight: 68.4 kg (150 lb 12.7 oz)  Body mass index is 24.34 kg/m².    Intake/Output Summary (Last 24 hours) at 03/17/18 1931  Last data filed at 03/17/18 1745   Gross per 24 hour   Intake              800 ml   Output             1600 ml   Net             -800 ml      Physical Exam   Constitutional: He appears well-nourished. No distress.   HENT:   Head: Atraumatic.   Eyes: EOM are normal.   Neck: Neck supple.   Cardiovascular: Regular rhythm and normal heart sounds.    Pulmonary/Chest: Effort normal and breath sounds normal. No respiratory distress.   Abdominal: Soft. Bowel sounds are normal. He exhibits no distension.   Musculoskeletal:  Normal range of motion. 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 He exhibits no edema.   Neurological: He is alert.   Oriented to self and place   Skin: Skin is dry.   RICHARD AVF good bruit and thrill       Significant Labs: All pertinent labs within the past 24 hours have been reviewed.    Significant Imaging: I have reviewed all pertinent imaging results/findings within the past 24 hours.

## 2018-03-18 NOTE — PLAN OF CARE
Problem: Patient Care Overview  Goal: Plan of Care Review  Outcome: Ongoing (interventions implemented as appropriate)  Pt alert and oriented to name, , place and situation. Pt bed in lowest position with call light within reach. Safety precautions maintained. No falls observed or reported, at this time. Pt pain assessed and managed. Pt assisted with toileting. Will continue to monitor

## 2018-03-18 NOTE — ASSESSMENT & PLAN NOTE
Nurse reports, one episode of emesis (undigested food) on yesterday with HD  - CT abdomen w/ PO contrast ordered, not completed today  - NPO 4 hours prior   - LUQ and RUQ TTP  - metoclopramide HCl tablet 10 mg TID before meals

## 2018-03-18 NOTE — PROGRESS NOTES
[facility] Hospital Medicine  Progress Note    Patient Name: Vaughn Retana  MRN: [id number]  Patient Class: IP- Inpatient   Admission Date: 3/15/2018  Length of Stay: 1 days  Attending Physician: LESTER Marks MD  Primary Care Provider: Yvette Zelaya NP    [facility] Medicine Team: [facility] MED E Rancho Carvalho PA-C    Subjective:     Principal Problem:Gastrointestinal hemorrhage    HPI:  Patient is a 52 year old gentleman with a h/o DM II, HTN, HLD, ESRD on HD, and esophagitis.  He presents with nausea and dark-colored emesis.  He also notes epigastric pain.  Patient was admitted on 3/7/3018 with similar symptoms and was diagnosed with esophagitis and started on a PPI.  He denies chest pain, SOB, dizziness, palpitations, fever/chills, diarrhea.    Hospital Course:  Vaughn Retana was placed in observation for evaluation for GI bleed. Gastroenterology consulted and EGD was performed on 3/16/18. Patient to follow-up with GI as an outpatient and continue Protonix 40mg BID daily. HD completed successfully on 3/17/18 however patient regurgitated non-digested food requiring Zofran x1. CT abdomen ordered.    Interval History: Vomited during HD today.      Review of Systems   Constitutional: Negative for chills and fever.   HENT: Negative for congestion, rhinorrhea, sore throat, trouble swallowing and voice change.    Eyes: Negative for visual disturbance.   Respiratory: Negative for cough, choking, chest tightness, shortness of breath and wheezing.    Cardiovascular: Negative for chest pain, palpitations and leg swelling.   Gastrointestinal: Positive for blood in stool, nausea and vomiting. Negative for diarrhea.        Hematemesis   Genitourinary: Negative for dysuria, flank pain, frequency, hematuria and urgency.   Musculoskeletal: Negative for arthralgias, back pain, joint swelling and myalgias.   Skin: Negative for color change and rash.   Neurological: Negative for dizziness,  speech difficulty and light-headedness.   Psychiatric/Behavioral: Positive for confusion. Negative for agitation and suicidal ideas.     Objective:     Vital Signs (Most Recent):  Temp: 98 °F (36.7 °C) (03/17/18 1745)  Pulse: 84 (03/17/18 1745)  Resp: 18 (03/17/18 1745)  BP: 131/86 (03/17/18 1745)  SpO2: 100 % (03/17/18 1745) Vital Signs (24h Range):  Temp:  [97.1 °F (36.2 °C)-98.3 °F (36.8 °C)] 98 °F (36.7 °C)  Pulse:  [72-90] 84  Resp:  [16-18] 18  SpO2:  [98 %-100 %] 100 %  BP: ()/(58-90) 131/86     Weight: 68.4 kg (150 lb 12.7 oz)  Body mass index is 24.34 kg/m².    Intake/Output Summary (Last 24 hours) at 03/17/18 1931  Last data filed at 03/17/18 1745   Gross per 24 hour   Intake              800 ml   Output             1600 ml   Net             -800 ml      Physical Exam   Constitutional: He appears well-nourished. No distress.   HENT:   Head: Atraumatic.   Eyes: EOM are normal.   Neck: Neck supple.   Cardiovascular: Regular rhythm and normal heart sounds.    Pulmonary/Chest: Effort normal and breath sounds normal. No respiratory distress.   Abdominal: Soft. Bowel sounds are normal. He exhibits no distension.   Musculoskeletal: Normal range of motion. 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 He exhibits no edema.   Neurological: He is alert.   Oriented to self and place   Skin: Skin is dry.   RICHARD AVF good bruit and thrill       Significant Labs: All pertinent labs within the past 24 hours have been reviewed.    Significant Imaging: I have reviewed all pertinent imaging results/findings within the past 24 hours.    Assessment/Plan:      * Gastrointestinal hemorrhage    Esophagitis determined by endoscopy  Anemia in chronic kidney disease    EGD completed 3/17  Gastroenterology instructions are as follows:   - Follow an antireflux regimen indefinitely.  - Consider avoiding all non-steroidal anti-inflammatory drugs (aspirin, ibuprofen,  naproxen, etc.), unless needed for cardiovascular protection.  Recommend you discuss with your prescribing doctor (of your aspirin) to see if cardiovascular benefits of your aspirin outweigh he risks of GI bleeding.   - Use Protonix (pantoprazole) 40 mg BID   - Repeat upper endoscopy in 8-10 weeks to check healing.  - Guaiac positive stool in ER  - Recent admission for similar symptoms with EGD on 3/8/2018 showed LA Grade B reflux esophagitis, 3cm sliding hiatal hernia, and a single gastric polyp.  - H/H stable at 10.9/32.2.  - Protonix 40mg IV daily.  - Supportive care.        Renovascular hypertension    - Continue lisinopril 40mg dialy, Coreg 25mg BID, and amlodipine 10mg daily.          ESRD on hemodialysis    Secondary hyperparathyroidism of renal origin  Chronic kidney disease-mineral and bone disorder    - Nephrology following--- HD today   - Patient on MWF schedule.  Last HD session 3/14 with no complications.  - Continue cinacalcet 60mg daily.        Dyslipidemia    - Continue Lipitor 80mg daily.            VTE Risk Mitigation         Ordered     IP VTE HIGH RISK PATIENT  Once      03/15/18 2321     Place sequential compression device  Until discontinued      03/15/18 2321              Rancho Carvalho PA-C  Department of Hospital Medicine   [facility]

## 2018-03-18 NOTE — SUBJECTIVE & OBJECTIVE
Interval History: No emesis reported today, controlled with PO meds. Awaiting CT abdomen.     Review of Systems   Constitutional: Negative for chills and fever.   HENT: Negative for congestion, rhinorrhea, sore throat, trouble swallowing and voice change.    Eyes: Negative for visual disturbance.   Respiratory: Negative for cough, choking, chest tightness, shortness of breath and wheezing.    Cardiovascular: Negative for chest pain, palpitations and leg swelling.   Gastrointestinal: Positive for abdominal pain, blood in stool, nausea and vomiting. Negative for diarrhea.        Hematemesis   Genitourinary: Negative for dysuria, flank pain, frequency, hematuria and urgency.   Musculoskeletal: Negative for arthralgias, back pain, joint swelling and myalgias.   Skin: Negative for color change and rash.   Neurological: Negative for dizziness, speech difficulty and light-headedness.   Psychiatric/Behavioral: Positive for confusion. Negative for agitation and suicidal ideas.     Objective:     Vital Signs (Most Recent):  Temp: 97.7 °F (36.5 °C) (03/18/18 1120)  Pulse: 95 (03/18/18 1120)  Resp: 18 (03/18/18 1120)  BP: 110/71 (03/18/18 1120)  SpO2: 100 % (03/18/18 1120) Vital Signs (24h Range):  Temp:  [96 °F (35.6 °C)-98.3 °F (36.8 °C)] 97.7 °F (36.5 °C)  Pulse:  [80-95] 95  Resp:  [16-18] 18  SpO2:  [98 %-100 %] 100 %  BP: (110-154)/(53-90) 110/71     Weight: 68.4 kg (150 lb 12.7 oz)  Body mass index is 24.34 kg/m².    Intake/Output Summary (Last 24 hours) at 03/18/18 1238  Last data filed at 03/17/18 1745   Gross per 24 hour   Intake              600 ml   Output             1600 ml   Net            -1000 ml      Physical Exam   Constitutional: He appears well-nourished. No distress.   HENT:   Head: Atraumatic.   Eyes: EOM are normal.   Neck: Neck supple.   Cardiovascular: Regular rhythm and normal heart sounds.    Pulmonary/Chest: Effort normal and breath sounds normal. No respiratory distress.   Abdominal: Soft. Bowel  sounds are normal. He exhibits no distension. There is tenderness (RLQ, LUQ).   Musculoskeletal: Normal range of motion. 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 He exhibits no edema.   Neurological: He is alert.   Oriented to self and place   Skin: Skin is dry.   RICHARD AVF good bruit and thrill   Nursing note and vitals reviewed.      Significant Labs: All pertinent labs within the past 24 hours have been reviewed.    Significant Imaging: I have reviewed all pertinent imaging results/findings within the past 24 hours.

## 2018-03-18 NOTE — PROGRESS NOTES
[facility] Hospital Medicine  Progress Note    Patient Name: Vaughn Retana  MRN: [id number]  Patient Class: IP- Inpatient   Admission Date: 3/15/2018  Length of Stay: 2 days  Attending Physician: LESTER Marks MD  Primary Care Provider: Yvette Zelaya NP    [facility] Medicine Team: [facility]OSP MED E Rancho Carvalho PA-C    Subjective:     Principal Problem:Gastrointestinal hemorrhage    HPI:  Patient is a 52 year old gentleman with a h/o DM II, HTN, HLD, ESRD on HD, and esophagitis.  He presents with nausea and dark-colored emesis.  He also notes epigastric pain.  Patient was admitted on 3/7/3018 with similar symptoms and was diagnosed with esophagitis and started on a PPI.  He denies chest pain, SOB, dizziness, palpitations, fever/chills, diarrhea.    Hospital Course:  Vaughn Retana was placed in observation for evaluation for GI bleed. Gastroenterology consulted and EGD was performed on 3/16/18. Patient to follow-up with GI as an outpatient and continue Protonix 40mg BID daily. HD completed successfully on 3/17/18 however patient regurgitated non-digested food requiring Zofran x1. CT abdomen ordered.    Interval History: No emesis reported today, controlled with PO meds. Awaiting CT abdomen.     Review of Systems   Constitutional: Negative for chills and fever.   HENT: Negative for congestion, rhinorrhea, sore throat, trouble swallowing and voice change.    Eyes: Negative for visual disturbance.   Respiratory: Negative for cough, choking, chest tightness, shortness of breath and wheezing.    Cardiovascular: Negative for chest pain, palpitations and leg swelling.   Gastrointestinal: Positive for abdominal pain, blood in stool, nausea and vomiting. Negative for diarrhea.        Hematemesis   Genitourinary: Negative for dysuria, flank pain, frequency, hematuria and urgency.   Musculoskeletal: Negative for arthralgias, back pain, joint swelling and myalgias.   Skin: Negative for  color change and rash.   Neurological: Negative for dizziness, speech difficulty and light-headedness.   Psychiatric/Behavioral: Positive for confusion. Negative for agitation and suicidal ideas.     Objective:     Vital Signs (Most Recent):  Temp: 97.7 °F (36.5 °C) (03/18/18 1120)  Pulse: 95 (03/18/18 1120)  Resp: 18 (03/18/18 1120)  BP: 110/71 (03/18/18 1120)  SpO2: 100 % (03/18/18 1120) Vital Signs (24h Range):  Temp:  [96 °F (35.6 °C)-98.3 °F (36.8 °C)] 97.7 °F (36.5 °C)  Pulse:  [80-95] 95  Resp:  [16-18] 18  SpO2:  [98 %-100 %] 100 %  BP: (110-154)/(53-90) 110/71     Weight: 68.4 kg (150 lb 12.7 oz)  Body mass index is 24.34 kg/m².    Intake/Output Summary (Last 24 hours) at 03/18/18 1238  Last data filed at 03/17/18 1745   Gross per 24 hour   Intake              600 ml   Output             1600 ml   Net            -1000 ml      Physical Exam   Constitutional: He appears well-nourished. No distress.   HENT:   Head: Atraumatic.   Eyes: EOM are normal.   Neck: Neck supple.   Cardiovascular: Regular rhythm and normal heart sounds.    Pulmonary/Chest: Effort normal and breath sounds normal. No respiratory distress.   Abdominal: Soft. Bowel sounds are normal. He exhibits no distension. There is tenderness (RLQ, LUQ).   Musculoskeletal: Normal range of motion. 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 He exhibits no edema.   Neurological: He is alert.   Oriented to self and place   Skin: Skin is dry.   RICHARD AVF good bruit and thrill   Nursing note and vitals reviewed.      Significant Labs: All pertinent labs within the past 24 hours have been reviewed.    Significant Imaging: I have reviewed all pertinent imaging results/findings within the past 24 hours.    Assessment/Plan:      * Gastrointestinal hemorrhage    Esophagitis determined by endoscopy  Anemia in chronic kidney disease  HDS  EGD completed 3/17 Gastroenterology instructions are as follows:   - Follow an antireflux regimen indefinitely.  - Consider  avoiding all non-steroidal anti-inflammatory drugs (aspirin, ibuprofen,  naproxen, etc.), unless needed for cardiovascular protection. Recommend you discuss with your prescribing doctor (of your aspirin) to see if cardiovascular benefits of your aspirin outweigh he risks of GI bleeding.   - Use Protonix (pantoprazole) 40 mg BID   - Repeat upper endoscopy in 8-10 weeks to check healing.  - Guaiac positive stool in ER  - Recent admission for similar symptoms with EGD on 3/8/2018 showed LA Grade B reflux esophagitis, 3cm sliding hiatal hernia, and a single gastric polyp.  - H/H stable at 10.9/32.2.  - Protonix 40mg IV daily.  - Supportive care.        Abdominal pain    Nurse reports, one episode of emesis (undigested food) on yesterday with HD  - CT abdomen w/ PO contrast ordered, not completed today  - NPO 4 hours prior   - LUQ and RUQ TTP  - metoclopramide HCl tablet 10 mg TID before meals         Renovascular hypertension    - Continue lisinopril 40mg dialy, Coreg 25mg BID, and amlodipine 10mg daily.          ESRD on hemodialysis    Secondary hyperparathyroidism of renal origin  Chronic kidney disease-mineral and bone disorder    - Nephrology following--- HD today   - Patient on MWF schedule.  Last HD session 3/14 with no complications.  - Continue cinacalcet 60mg daily.        Dyslipidemia    - Continue Lipitor 80mg daily.            VTE Risk Mitigation         Ordered     IP VTE HIGH RISK PATIENT  Once      03/15/18 2321     Place sequential compression device  Until discontinued      03/15/18 2321              Rancho Carvalho PA-C  Department of Hospital Medicine   [facility]

## 2018-03-18 NOTE — PROGRESS NOTES
[facility]  Weekend coverage    Vaughn Retana is a 52 y.o. male was seen on hemodialysis for removal of uremic toxins and volume.    Pt was seen during hemodialysis today, dialysis flowsheet, labs, vitals were reviewed and d/w staff.      Pt had nausea and emesis during treatment. He reported decreased oral intake.    Vitals, dialysis flow sheet were reviewed    Labs were reviewed and the dialysate bath was adjusted.    During HD, he had small emesis but otherwise breathing unlabored. He appeared fatigued    Labs:        Recent Labs  Lab 03/15/18  2200 03/16/18  0503 03/17/18  0456    138 138   K 3.3* 3.1* 3.5   CL 90* 91* 90*   CO2 31* 34* 35*   BUN 12 16 23*   CREATININE 7.8* 8.8* 10.8*   CALCIUM 8.7 8.2* 8.2*   PHOS 3.5 4.2  --          Recent Labs  Lab 03/15/18  2200 03/16/18  0503 03/17/18  0456   WBC 8.06 6.96 6.64   HGB 10.9* 9.8* 9.3*   HCT 32.2* 29.5* 27.6*    242 229       Assessment/Plan:    ESRD  HD given today for removal of uremic toxins and volume.  Dialysate bath was adjusted per labs  UF was limited to one liter as he was noted to have signs of volume contraction

## 2018-03-19 ENCOUNTER — TELEPHONE (OUTPATIENT)
Dept: TRANSPLANT | Facility: CLINIC | Age: 53
End: 2018-03-19

## 2018-03-19 VITALS
SYSTOLIC BLOOD PRESSURE: 108 MMHG | BODY MASS INDEX: 24.24 KG/M2 | OXYGEN SATURATION: 94 % | WEIGHT: 150.81 LBS | RESPIRATION RATE: 16 BRPM | DIASTOLIC BLOOD PRESSURE: 69 MMHG | HEIGHT: 66 IN | HEART RATE: 77 BPM | TEMPERATURE: 98 F

## 2018-03-19 LAB
ALBUMIN SERPL BCP-MCNC: 2.9 G/DL
ALP SERPL-CCNC: 155 U/L
ALT SERPL W/O P-5'-P-CCNC: 7 U/L
ANION GAP SERPL CALC-SCNC: 10 MMOL/L
AST SERPL-CCNC: 15 U/L
BASOPHILS # BLD AUTO: 0.02 K/UL
BASOPHILS NFR BLD: 0.3 %
BILIRUB SERPL-MCNC: 0.5 MG/DL
BUN SERPL-MCNC: 15 MG/DL
CALCIUM SERPL-MCNC: 7.7 MG/DL
CHLORIDE SERPL-SCNC: 103 MMOL/L
CO2 SERPL-SCNC: 22 MMOL/L
CREAT SERPL-MCNC: 9 MG/DL
DIFFERENTIAL METHOD: ABNORMAL
EOSINOPHIL # BLD AUTO: 0.9 K/UL
EOSINOPHIL NFR BLD: 14.4 %
ERYTHROCYTE [DISTWIDTH] IN BLOOD BY AUTOMATED COUNT: 13.6 %
EST. GFR  (AFRICAN AMERICAN): 6.9 ML/MIN/1.73 M^2
EST. GFR  (NON AFRICAN AMERICAN): 6 ML/MIN/1.73 M^2
GLUCOSE SERPL-MCNC: 79 MG/DL
HCT VFR BLD AUTO: 27.6 %
HGB BLD-MCNC: 9.1 G/DL
IMM GRANULOCYTES # BLD AUTO: 0.01 K/UL
IMM GRANULOCYTES NFR BLD AUTO: 0.2 %
LYMPHOCYTES # BLD AUTO: 1.9 K/UL
LYMPHOCYTES NFR BLD: 30.7 %
MCH RBC QN AUTO: 31.9 PG
MCHC RBC AUTO-ENTMCNC: 33 G/DL
MCV RBC AUTO: 97 FL
MONOCYTES # BLD AUTO: 0.6 K/UL
MONOCYTES NFR BLD: 10.4 %
NEUTROPHILS # BLD AUTO: 2.7 K/UL
NEUTROPHILS NFR BLD: 44 %
NRBC BLD-RTO: 0 /100 WBC
PLATELET # BLD AUTO: 208 K/UL
PMV BLD AUTO: 10.4 FL
POTASSIUM SERPL-SCNC: 4 MMOL/L
PROT SERPL-MCNC: 7.2 G/DL
RBC # BLD AUTO: 2.85 M/UL
SODIUM SERPL-SCNC: 135 MMOL/L
WBC # BLD AUTO: 6.05 K/UL

## 2018-03-19 PROCEDURE — 25000003 PHARM REV CODE 250: Performed by: PHYSICIAN ASSISTANT

## 2018-03-19 PROCEDURE — 36415 COLL VENOUS BLD VENIPUNCTURE: CPT

## 2018-03-19 PROCEDURE — 80053 COMPREHEN METABOLIC PANEL: CPT

## 2018-03-19 PROCEDURE — 85025 COMPLETE CBC W/AUTO DIFF WBC: CPT

## 2018-03-19 PROCEDURE — 25000003 PHARM REV CODE 250: Performed by: NURSE PRACTITIONER

## 2018-03-19 PROCEDURE — 90935 HEMODIALYSIS ONE EVALUATION: CPT

## 2018-03-19 PROCEDURE — 99239 HOSP IP/OBS DSCHRG MGMT >30: CPT | Mod: ,,, | Performed by: PHYSICIAN ASSISTANT

## 2018-03-19 PROCEDURE — 90935 HEMODIALYSIS ONE EVALUATION: CPT | Mod: ,,, | Performed by: NURSE PRACTITIONER

## 2018-03-19 RX ORDER — METOCLOPRAMIDE 10 MG/1
10 TABLET ORAL
Qty: 90 TABLET | Refills: 0 | Status: SHIPPED | OUTPATIENT
Start: 2018-03-19 | End: 2018-04-18

## 2018-03-19 RX ORDER — POLYETHYLENE GLYCOL 3350 17 G/17G
17 POWDER, FOR SOLUTION ORAL 2 TIMES DAILY
Qty: 60 EACH | Refills: 11 | Status: SHIPPED | OUTPATIENT
Start: 2018-03-19 | End: 2018-04-18

## 2018-03-19 RX ORDER — SODIUM CHLORIDE 9 MG/ML
INJECTION, SOLUTION INTRAVENOUS
Status: DISCONTINUED | OUTPATIENT
Start: 2018-03-19 | End: 2018-03-20 | Stop reason: HOSPADM

## 2018-03-19 RX ORDER — SODIUM CHLORIDE 9 MG/ML
INJECTION, SOLUTION INTRAVENOUS ONCE
Status: COMPLETED | OUTPATIENT
Start: 2018-03-19 | End: 2018-03-19

## 2018-03-19 RX ADMIN — CHLORPROMAZINE HYDROCHLORIDE 25 MG: 25 TABLET, SUGAR COATED ORAL at 09:03

## 2018-03-19 RX ADMIN — PANTOPRAZOLE SODIUM 40 MG: 40 TABLET, DELAYED RELEASE ORAL at 06:03

## 2018-03-19 RX ADMIN — AMLODIPINE BESYLATE 10 MG: 10 TABLET ORAL at 06:03

## 2018-03-19 RX ADMIN — METOCLOPRAMIDE 10 MG: 10 TABLET ORAL at 06:03

## 2018-03-19 RX ADMIN — CARVEDILOL 25 MG: 25 TABLET, FILM COATED ORAL at 09:03

## 2018-03-19 RX ADMIN — ATORVASTATIN CALCIUM 80 MG: 20 TABLET, FILM COATED ORAL at 09:03

## 2018-03-19 RX ADMIN — SODIUM CHLORIDE: 0.9 INJECTION, SOLUTION INTRAVENOUS at 01:03

## 2018-03-19 RX ADMIN — METOCLOPRAMIDE 10 MG: 10 TABLET ORAL at 05:03

## 2018-03-19 RX ADMIN — CHLORPROMAZINE HYDROCHLORIDE 25 MG: 25 TABLET, SUGAR COATED ORAL at 05:03

## 2018-03-19 RX ADMIN — PANTOPRAZOLE SODIUM 40 MG: 40 TABLET, DELAYED RELEASE ORAL at 05:03

## 2018-03-19 NOTE — NURSING
Pt ready for discharge. Discharge instructions and prescriptions given and explained to patient. Patient verbalizes understanding. PIV removed. No further questions from pt. Pt to be discharged via regional transportion. Will cont to monitor.

## 2018-03-19 NOTE — DISCHARGE SUMMARY
[facility] Hospital Medicine  Discharge Summary      Patient Name: Vaughn Retana  MRN: [id number]  Admission Date: 3/15/2018  Hospital Length of Stay: 3 days  Discharge Date and Time:  03/19/2018 1:37 PM  Attending Physician: LESTER Marks MD   Discharging Provider: Rancho Carvalho PA-C  Primary Care Provider: Yvette Zelaya NP  [facility] Medicine Team: [facility] E Rancho Carvalho PA-C    HPI:   Patient is a 52 year old gentleman with a h/o DM II, HTN, HLD, ESRD on HD, and esophagitis.  He presents with nausea and dark-colored emesis.  He also notes epigastric pain.  Patient was admitted on 3/7/3018 with similar symptoms and was diagnosed with esophagitis and started on a PPI.  He denies chest pain, SOB, dizziness, palpitations, fever/chills, diarrhea.    Procedure(s) (LRB):  ESOPHAGOGASTRODUODENOSCOPY (EGD) (N/A)      Hospital Course:   Vaughn Retana was placed in observation for evaluation for GI bleed. Gastroenterology consulted and EGD was performed on 3/16/18. Patient to follow-up with GI (awaiting pathology results) as an outpatient and continue Protonix 40mg BID daily. HD completed successfully on 3/17/18 however patient regurgitated non-digested food requiring Zofran x1. CT abdomen findings below. Remained HDS. HD completed successfully on 3/19/18 however patient with hypotension during/afer session. In the future, please hold BP medications prior to HD. Stable for discharge, follow-up 3/22 with PCP.    Imaging:    Ct Abdomen Pelvis  Without Contrast  Result Date: 3/19/2018  EXAMINATION: CT ABDOMEN PELVIS WITHOUT CONTRAST CLINICAL HISTORY: Abdominal pain, unspecified;N/V; TECHNIQUE: Low dose axial images, sagittal and coronal reformations were obtained from the lung bases to the pubic symphysis.  Water-soluble oral contrast was administered prior to image acquisition. COMPARISON: CT abdomen and pelvis with contrast 07/10/2017 FINDINGS: There is dependent bibasilar  atelectatic change.  There is a calcified left lower lobe pulmonary granuloma, similar to prior examination.  Visualized portions of the heart and pericardium are within normal limits. Please note evaluation of solid organ parenchyma is limited by noncontrast technique.  The liver is normal in size and attenuation without definite focal abnormality.  There is a calcified stone within the gallbladder lumen measuring 1.6 cm.  There is no definite intra or extrahepatic biliary ductal dilatation.  The spleen, pancreas, and adrenal glands demonstrate an unremarkable noncontrast CT appearance. The kidney is are diminutive in size with bilateral renal cortical thinning and bilateral parenchymal scarring.  There are multiple renal vascular calcifications versus nonobstructing nephroliths.  There are innumerable subcentimeter renal cortical and exophytic hypodensities bilaterally, which are too small to definitively characterize nonemergent renal ultrasound can be performed for more definitive evaluation as clinically warranted.  There is no evidence of hydronephrosis.  The prostate is prominent in size.  The urinary is suboptimally distended with circumferential wall thickening, which could be secondary to nondistention but can also be seen in the setting of cystitis or chronic outlet obstruction.  Clinical correlation advised. There is diffuse circumferential distal esophageal wall thickening, which can be seen in the setting of underlying esophagitis although underlying neoplastic etiology cannot be definitively excluded.  Correlation with EGD is recommended.  The stomach is suboptimally distended.  There is enteric contrast visualized throughout nondilated loops of large and small bowel.  The appendix is visualized and is unremarkable.  There is slight wall thickening involving the sigmoid colon, possibly relating to nondistention.  Clinical correlation for symptoms of colitis recommended.  There is no free  intraperitoneal air, portal venous gas or ascites. The abdominal aorta is non aneurysmal.  With moderate aortoiliac atherosclerosis.  There is no bulky abdominopelvic adenopathy.  Visualized osseous structures demonstrate chronic changes suggestive of sequela of renal osteodystrophy.  No acute osseous abnormality identified.     1. This diffuse circumferential distal esophageal wall thickening, similar to prior CT examination. Findings can be seen in the setting of esophagitis with underlying neoplasm not definitively excluded. Correlation with EGD is recommended. 2. Findings suggestive of chronic medical renal disease with innumerable subcentimeter renal hypodensities, too small to definitively characterize on CT.   3. Colonic wall thickening involving the sigmoid colon, likely relating to nondistention although clinical correlation for symptoms of colitis is recommended.   4. Cholelithiasis.   5. Multiple additional findings as above.        Consults:   Consults         Status Ordering Provider     Inpatient consult to Gastroenterology  Once     Provider:  (Not yet assigned)    Completed AMEE MOSS     Inpatient consult to Nephrology  Once     Provider:  (Not yet assigned)    Completed AMEE MOSS          No new Assessment & Plan notes have been filed under this hospital service since the last note was generated.  Service: Hospital Medicine    Final Active Diagnoses:    Diagnosis Date Noted POA    PRINCIPAL PROBLEM:  Gastrointestinal hemorrhage [K92.2] 03/15/2018 Yes    Abdominal pain [R10.9] 03/18/2018 Unknown    Renovascular hypertension [I15.0] 03/16/2018 Yes     Chronic    Dyslipidemia [E78.5] 02/07/2013 Yes     Chronic    ESRD on hemodialysis [N18.6, Z99.2] 02/07/2013 Not Applicable     Chronic      Problems Resolved During this Admission:    Diagnosis Date Noted Date Resolved POA       Discharged Condition: stable    Disposition: Home or Self Care    Follow Up:  Follow-up Information      Yvette Zelaya NP On 3/22/2018.    Specialty:  Family Medicine  Why:  at 10:00 AM  Contact information:  Jewell Veloz  [facility] [id number] [phone]                 Patient Instructions:     Diet renal     Activity as tolerated     Notify your health care provider if you experience any of the following:  persistent nausea and vomiting or diarrhea         Significant Diagnostic Studies: Labs:   BMP:     Recent Labs  Lab 03/18/18  0408 03/19/18  0803   * 79    135*   K 4.2 4.0    103   CO2 23 22*   BUN 11 15   CREATININE 6.9* 9.0*   CALCIUM 8.0* 7.7*   , CBC     Recent Labs  Lab 03/18/18  0408 03/19/18  0803   WBC 6.88 6.05   HGB 9.5* 9.1*   HCT 28.7* 27.6*    208    and All labs within the past 24 hours have been reviewed    Pending Diagnostic Studies:     None         Medications:  Reconciled Home Medications:   Current Discharge Medication List      START taking these medications    Details   metoclopramide HCl (REGLAN) 10 MG tablet Take 1 tablet (10 mg total) by mouth 3 (three) times daily before meals.  Qty: 90 tablet, Refills: 0      !! pantoprazole (PROTONIX) 40 MG tablet Take 1 tablet (40 mg total) by mouth 2 (two) times daily before meals.  Qty: 60 tablet, Refills: 11      polyethylene glycol (GLYCOLAX) 17 gram PwPk Take 17 g by mouth 2 (two) times daily.  Qty: 60 each, Refills: 11       !! - Potential duplicate medications found. Please discuss with provider.      CONTINUE these medications which have NOT CHANGED    Details   amLODIPine (NORVASC) 10 MG tablet Take 1 tablet (10 mg total) by mouth every morning.  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  Qty: 30 tablet, Refills: 2    Associated Diagnoses: Dyslipidemia      carvedilol (COREG) 25 MG tablet Take 1 tablet (25 mg total) by mouth 2 (two)  times daily with meals.  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  Qty: 90 tablet, Refills: 11    Associated Diagnoses: Intractable hiccups      cinacalcet (SENSIPAR) 60 MG Tab Take 1 tablet (60 mg total) by mouth daily with breakfast.  Qty: 30 tablet, Refills: 3    Associated Diagnoses: ESRD (end stage renal disease) on dialysis      hydrALAZINE (APRESOLINE) 50 MG tablet Take 1 tablet (50 mg total) by mouth every 8 (eight) hours as needed (systolic blood pressure (top number) > 180).  Qty: 90 tablet, Refills: 4    Associated Diagnoses: Malignant hypertension with heart failure and end-stage renal dis      lisinopril (PRINIVIL,ZESTRIL) 40 MG tablet Take 1 tablet (40 mg total) by mouth every evening.  Qty: 90 tablet, Refills: 4    Associated Diagnoses: Malignant hypertension with heart failure and end-stage renal dis      ondansetron (ZOFRAN) 8 MG tablet Take 1 tablet (8 mg total) by mouth every 8 (eight) hours as needed for Nausea.  Qty: 15 tablet, Refills: 0    Associated Diagnoses: Hiccups      !! pantoprazole (PROTONIX) 40 MG tablet Take 1 tablet (40 mg total) by mouth every 12 (twelve) hours.  Qty: 60 tablet, Refills: 5    Associated Diagnoses: Hiatal hernia; Gastroesophageal reflux disease, esophagitis presence not specified; Esophagitis, erosive      senna-docusate 8.6-50 mg (PERICOLACE) 8.6-50 mg per tablet Take 2 tablets by mouth once daily.       !! - Potential duplicate medications found. Please discuss with provider.          Indwelling Lines/Drains at time of discharge:   Lines/Drains/Airways     Drain                 Hemodialysis AV Fistula Right upper arm -- days         Hemodialysis AV Graft 01/22/11 1828 Right upper arm 2612 days         Hemodialysis AV Fistula 03/08/18 1522 Right upper arm 10 days                Time  spent on the discharge of patient: 30 minutes  Patient was seen and examined on the date of discharge and determined to be suitable for discharge.         Rancho Carvalho PA-C  Department of Hospital Medicine  [facility]

## 2018-03-19 NOTE — PROGRESS NOTES
[facility] NOTE     Patient currently on hemodialysis for removal of uremic toxins .     Patient seen and evaluated on hemodialysis, tolerating treatment, see HD flowsheet for vitals and assessments.      No chest pain, shortness of breath, cramping, nausea or vomiting.     SBP 90 and only able to UF 1 L. Will need adjustment in blood pressure medications or holding prior to HD-Discussed with primary team.     Geetha Ramos NP  Nephrology

## 2018-03-19 NOTE — PROGRESS NOTES
Transported from dialysis unit to room 509A with prosthetic and patient belongings bag via stretcher by transporter.

## 2018-03-19 NOTE — PLAN OF CARE
Transportation scheduled through [facility] [id number].  Requested  time is within the hour.    Leyda Sparks LMSW

## 2018-03-19 NOTE — PLAN OF CARE
Problem: Patient Care Overview  Goal: Plan of Care Review  Outcome: Unable to achieve outcome(s) by discharge  3 hour dialysis complete.  Blood rinsed back.  Deaver pulled from RICHARD fistula.  Pressure held x 5 minutes.  Hemostasis achieved.  Covered with gauze and paper tape.  +thrill +bruit.  Net UF 1.2L.  Tolerated well.

## 2018-03-20 NOTE — PLAN OF CARE
03/20/18 0746   Final Note   Assessment Type Final Discharge Note     Patient discharged home with no needs. Transportation arranged by NAT[facility].

## 2018-03-20 NOTE — PHYSICIAN QUERY
"PT Name: Vaughn Retana  MR #: [id number]     Physician Query Form - Documentation Clarification      Ramila Curiel RN, CCDS  Contact Info: [phone] [email or web address]      This form is a permanent document in the medical record.     Query Date: March 20, 2018    By submitting this query, we are merely seeking further clarification of documentation. Please utilize your independent clinical judgment when addressing the question(s) below.    The Medical record reflects the following:    Supporting Clinical Findings Location in Medical Record   Impression:     - Normal examined duodenum.   - One gastric polyp. Resected and retrieved.   - Erythematous mucosa in the gastric body, antrum   and prepyloric region of the stomach. Biopsied.   - 4 cm hiatal hernia.   - LA Grade C reflux esophagitis.   - Non-bleeding gastric ulcer with a clean ulcer   base (Lucas Class III).   EGD Report 3/16     - Use Protonix (pantoprazole) 40 mg BID   - Repeat upper endoscopy in 8-10 weeks to check healing.  - Guaiac positive stool in ER  - H/H stable at 10.9/32.2.  - Protonix 40mg IV daily.  - Supportive care.   Hosp Med 3/17    Gastrointestinal hemorrhage  history of esophagitis presents with coffee ground emesis likely from unhealed esophagitis GI CN 3/16                                                                          Doctor, Please specify diagnosis or diagnoses associated with above clinical findings.      Please further specify "gastric ulcer".     Provider Use Only    (  ) Acute Gastric Ulcer with hemorrhage or bleeding  (  ) Chronic Gastric Ulcer with hemorrhage or bleeding  (  ) Acute and Chronic Gastric Ulcer with bleeding    (  ) Acute Gastric Ulcer w/o perforation of hemorrhage or bleeding  (  ) Chronic Gastric Ulcer w/o perforation or hemorrhage or bleeding  (x) Acute and Chronic Gastric Ulcer without bleeding    (  ) Other diagnosis - specify: ___________________                                               "                                                               [  ] Clinically undetermined

## 2018-03-20 NOTE — PHYSICIAN QUERY
"PT Name: Vaughn Retana  MR #: [id number]     Physician Query Form - Documentation Clarification      Ramila Curiel RN, CCDS  Contact Info: [phone] [email or web address]      This form is a permanent document in the medical record.     Query Date: March 20, 2018    By submitting this query, we are merely seeking further clarification of documentation. Please utilize your independent clinical judgment when addressing the question(s) below.    The Medical record reflects the following:    Supporting Clinical Findings Location in Medical Record   Esophagitis determined by endoscopy    - Recent admission for similar symptoms with EGD on 3/8/2018 showed LA Grade B reflux esophagitis, 3cm sliding hiatal hernia, and a single gastric polyp.    Will readmit for persistent upper GI bleed    H&P            ED MD Note   Gastrointestinal hemorrhage  history of esophagitis presents with coffee ground emesis likely from unhealed esophagitis.    EGD completed 3/17   - Follow an antireflux regimen indefinitely.  - Use Protonix (pantoprazole) 40 mg BID   - Repeat upper endoscopy in 8-10 weeks to check healing.   GI CN 3/16            Hosp Med 3/17      Impression:     - Normal examined duodenum.   - One gastric polyp. Resected and retrieved.   - Erythematous mucosa in the gastric body, antrum   and prepyloric region of the stomach. Biopsied.   - 4 cm hiatal hernia.   - LA Grade C reflux esophagitis.   - Non-bleeding gastric ulcer with a clean ulcer   base (Lucas Class III). EGD Report 3/16                                                                           Doctor, Please specify diagnosis or diagnoses associated with above clinical findings.     Please specify etiology of "Upper GI Bleed".  Huang all that apply    Provider Use Only      (  ) Acute Gastric Ulcer with bleeding  (  ) Chronic Gastric Ulcer with bleeding  (  ) Acute and Chronic Gastric Ulcer with bleeding    ( ) Erosion of esophagus with bleeding  (  ) " Ulcerative Esophagitis with bleeding  ( x ) Esophagitis due to gastrointestinal reflux disease  (  ) Other type - specify: _____________  (  ) Unspecified Esophagitis    (  ) Other etiology: _____________________                                                                                                             [  ] Clinically undetermined

## 2018-03-21 ENCOUNTER — PATIENT OUTREACH (OUTPATIENT)
Dept: ADMINISTRATIVE | Facility: CLINIC | Age: 53
End: 2018-03-21

## 2018-03-21 NOTE — PATIENT INSTRUCTIONS
When You Have Gastrointestinal (GI) Bleeding    Blood in your vomit or stool can be a sign of gastrointestinal (GI) bleeding. GI bleeding can be scary. But the cause may not be serious. You should always see a doctor if GI bleeding occurs.  The GI tract  The GI tract is the path through which food travels in the body. Food passes from the mouth down the esophagus (the tube from the mouth to the stomach). Food begins to break down in the stomach. It then moves through the duodenum, the first part of the small intestine. Nutrients are absorbed as food travels through the small intestine. What is left passes into the colon (large intestine) as waste. The colon removes water from the waste. Waste continues from the colon to the rectum (where stool is stored). Waste then leaves the body through the anus.  Causes of GI bleeding  GI bleeding can be caused by many different problems. Some of the more common causes include:  · Swollen veins in the anus (hemorrhoids)  · Swollen veins in the esophagus (varices)  · Sore on the lining of the GI tract (ulcer)  · Cuts or scrapes in the mouth or throat  · Infection caused by germs such as bacteria or parasites  · Food allergies, such as milk allergy in young children  · Medicines  · Inflammation of the GI tract (gastritis or esophagitis)  · Colitis (Crohn's disease or ulcerative colitis)  · Cancer (tumors or polyps)  · Abnormal pouches in the colon (diverticula)  · Tears in the esophagus or anus  · Nosebleed  · Abnormal blood vessels in the GI tract (angiodysplasia)  Diagnosing the cause of blood in stool  If blood is coming out in your stool, you may have a lower GI tract problem or a very fast upper GI tract bleed. Bleeding from the GI tract can be bright red. Or it may look dark and tarry. Tests may also find blood in your stool that cant be seen with the eye (occult blood). To find out the cause, tests that may be ordered include:  · Blood tests. A blood sample is taken and  sent to a lab for exam.  · Hemoccult test. Checks a stool sample for blood.  · Stool culture. Checks a stool sample for bacteria or parasites.  · X-ray, ultrasound, or CT scan. Imaging tests that take pictures of the digestive tract.  · Colonoscopy or sigmoidoscopy. This test uses a flexible tube with a tiny camera. The tube is inserted through your anus into your rectum to see the inside of your colon. Your provider can also take a tiny tissue sample (biopsy) and treat a bleeding source  Diagnosing the cause of blood in vomit  If you are vomiting blood or something that looks like coffee grounds, you may have an upper GI tract problem. To find the cause, tests that may be done include:  · Upper Endoscopy. A flexible tube with a tiny camera is inserted through your mouth and throat to see inside your upper GI tract. This lets your provider take a tiny tissue sample (biopsy) and treat a bleeding source.  · Nasogastric lavage. This can tell if you have upper GI or lower GI bleeding.  · X-ray, ultrasound, or CT scan. Imaging tests that take pictures of your digestive tract.  · Upper GI series. X-rays of the upper part of your GI tract taken from inside your body.  · Enteroscopy. This sends a flexible tube or a small, swallowed capsule camera into your small intestine.  When to call your healthcare provider  Call your healthcare provider right away if you have any of the following:  · Bleeding from your mouth or anus that can't be stopped  · Fever of 100.4°F (38.0°) or higher  · Bleeding along with feeling lightheaded or dizzy  · Signs of fluid loss (dehydration). These include a dry, sticky mouth, decreased urine output; and very dark urine.  · Belly (abdominal) pain   Date Last Reviewed: 7/1/2016  © 0185-1659 [employer]. [address] [place] [address], [place] [address]. All rights reserved. This information is not intended as a substitute for professional medical care. Always follow your healthcare  professional's instructions.

## 2018-03-22 ENCOUNTER — LAB VISIT (OUTPATIENT)
Dept: LAB | Facility: HOSPITAL | Age: 53
End: 2018-03-22
Payer: MEDICARE

## 2018-03-22 ENCOUNTER — OFFICE VISIT (OUTPATIENT)
Dept: INTERNAL MEDICINE | Facility: CLINIC | Age: 53
End: 2018-03-22
Payer: MEDICARE

## 2018-03-22 ENCOUNTER — OUTPATIENT CASE MANAGEMENT (OUTPATIENT)
Dept: ADMINISTRATIVE | Facility: OTHER | Age: 53
End: 2018-03-22

## 2018-03-22 ENCOUNTER — TELEPHONE (OUTPATIENT)
Dept: INTERNAL MEDICINE | Facility: CLINIC | Age: 53
End: 2018-03-22

## 2018-03-22 VITALS
SYSTOLIC BLOOD PRESSURE: 120 MMHG | HEIGHT: 66 IN | WEIGHT: 154.13 LBS | OXYGEN SATURATION: 98 % | HEART RATE: 80 BPM | BODY MASS INDEX: 24.77 KG/M2 | DIASTOLIC BLOOD PRESSURE: 84 MMHG

## 2018-03-22 DIAGNOSIS — Z89.512 HISTORY OF LEFT BELOW KNEE AMPUTATION: Chronic | ICD-10-CM

## 2018-03-22 DIAGNOSIS — I61.9 NONTRAUMATIC INTRACEREBRAL HEMORRHAGE, UNSPECIFIED CEREBRAL LOCATION, UNSPECIFIED LATERALITY: ICD-10-CM

## 2018-03-22 DIAGNOSIS — K20.90 ESOPHAGITIS DETERMINED BY ENDOSCOPY: Chronic | ICD-10-CM

## 2018-03-22 DIAGNOSIS — Z99.2 ESRD ON HEMODIALYSIS: Chronic | ICD-10-CM

## 2018-03-22 DIAGNOSIS — E11.51 PERIPHERAL VASCULAR DISEASE IN DIABETES MELLITUS: Chronic | ICD-10-CM

## 2018-03-22 DIAGNOSIS — D63.1 ANEMIA IN CHRONIC KIDNEY DISEASE, ON CHRONIC DIALYSIS: Chronic | ICD-10-CM

## 2018-03-22 DIAGNOSIS — K92.2 GASTROINTESTINAL HEMORRHAGE, UNSPECIFIED GASTROINTESTINAL HEMORRHAGE TYPE: ICD-10-CM

## 2018-03-22 DIAGNOSIS — Z99.2 ANEMIA IN CHRONIC KIDNEY DISEASE, ON CHRONIC DIALYSIS: Chronic | ICD-10-CM

## 2018-03-22 DIAGNOSIS — E78.5 DYSLIPIDEMIA: Chronic | ICD-10-CM

## 2018-03-22 DIAGNOSIS — N18.6 ESRD ON HEMODIALYSIS: Chronic | ICD-10-CM

## 2018-03-22 DIAGNOSIS — N25.81 SECONDARY HYPERPARATHYROIDISM OF RENAL ORIGIN: Chronic | ICD-10-CM

## 2018-03-22 DIAGNOSIS — N18.6 ANEMIA IN CHRONIC KIDNEY DISEASE, ON CHRONIC DIALYSIS: Chronic | ICD-10-CM

## 2018-03-22 DIAGNOSIS — K20.90 ESOPHAGITIS DETERMINED BY ENDOSCOPY: Primary | Chronic | ICD-10-CM

## 2018-03-22 DIAGNOSIS — I15.0 RENOVASCULAR HYPERTENSION: Chronic | ICD-10-CM

## 2018-03-22 LAB
BASOPHILS # BLD AUTO: 0.02 K/UL
BASOPHILS NFR BLD: 0.3 %
DIFFERENTIAL METHOD: ABNORMAL
EOSINOPHIL # BLD AUTO: 1 K/UL
EOSINOPHIL NFR BLD: 13.8 %
ERYTHROCYTE [DISTWIDTH] IN BLOOD BY AUTOMATED COUNT: 14.5 %
HCT VFR BLD AUTO: 30.5 %
HGB BLD-MCNC: 9.9 G/DL
LYMPHOCYTES # BLD AUTO: 2 K/UL
LYMPHOCYTES NFR BLD: 28 %
MCH RBC QN AUTO: 31.7 PG
MCHC RBC AUTO-ENTMCNC: 32.5 G/DL
MCV RBC AUTO: 98 FL
MONOCYTES # BLD AUTO: 0.8 K/UL
MONOCYTES NFR BLD: 10.6 %
NEUTROPHILS # BLD AUTO: 3.4 K/UL
NEUTROPHILS NFR BLD: 47.2 %
PLATELET # BLD AUTO: 203 K/UL
PMV BLD AUTO: 10.7 FL
RBC # BLD AUTO: 3.12 M/UL
WBC # BLD AUTO: 7.17 K/UL

## 2018-03-22 PROCEDURE — 36415 COLL VENOUS BLD VENIPUNCTURE: CPT

## 2018-03-22 PROCEDURE — 99496 TRANSJ CARE MGMT HIGH F2F 7D: CPT | Mod: S$PBB,,, | Performed by: NURSE PRACTITIONER

## 2018-03-22 PROCEDURE — 85025 COMPLETE CBC W/AUTO DIFF WBC: CPT

## 2018-03-22 PROCEDURE — 99214 OFFICE O/P EST MOD 30 MIN: CPT | Mod: PBBFAC | Performed by: NURSE PRACTITIONER

## 2018-03-22 PROCEDURE — 99496 TRANSJ CARE MGMT HIGH F2F 7D: CPT | Mod: PBBFAC | Performed by: NURSE PRACTITIONER

## 2018-03-22 PROCEDURE — 99999 PR PBB SHADOW E&M-EST. PATIENT-LVL IV: CPT | Mod: PBBFAC,,, | Performed by: NURSE PRACTITIONER

## 2018-03-22 NOTE — PROGRESS NOTES
Pt gives permission to speak with his sister and niece. Pt attempts to give this cm his sister's telephone number. Pt able to give this cm his niece's phone number. Pt seen in clinic per request of Yvette Ng NP, due to confusion with meds. Reviewed with pt the three medications that he had brought to this appt. Pt unable to verbalize purpose and frequency of medications. Pt reports that he currently gets his medicines from [facility] from the [facility]. Pt reports that he is thinking about changing his pharmacy to the [facility] close to his [place] on [address]. Pt reports that he has transportation to his medical appts. Pt reports that his sister or niece brings him to his appts. Pt reports that he may miss an appt because he forgot that he had an appt scheduled. Pt reports that he lives with his niece and nephew. Pt reports that his niece will not read the mail that's addressed with the pt's name. Spoke with pt's sister, Alicia Retana, and informed that [facility] will bubble pack the pt's meds. Informed that this cm can mail a pillbox to the pt's home. Ms. Retana reports that she will like to try the pillbox before having the meds bubble packed. Reports that they will do whatever will help the pt with taking his medications.  Intervention: Reviewed case with Yvette Ng NP. Dicussed having appts mailed addressed to the pt's niece, Daisy Retana                      [facility] on [address] called. Was informed that they do not bubble pack medications.                      [facility] mailed   Plan:  No further referrals at this time.

## 2018-03-22 NOTE — TELEPHONE ENCOUNTER
Discussed H&H has increased- spoke with pt and sister who states pt is taking Protonix twice daily

## 2018-03-22 NOTE — PROGRESS NOTES
INTERNAL MEDICINE PROGRESS NOTE    CHIEF COMPLAINT     Chief Complaint   Patient presents with    Hospital Follow Up       HPI     Vaughn Retana is a 52 y.o. male with DMII, HTN, HLD, ESRD on HD, and esophagitis who presents for a hospital follow up visit today.  Was admitted to [facility] for GI bleed x 3 days.     Was recently admitted for esophagitis, started on PPI. Presented to the ED with nasue and dark-colored emesis. Admitted to OBS for GI bleed   EGD was performed on 3/16/18    GI bleed- continued on protonix 40mg BID. Cont reglan before meals and miralax twice daily   Follow up with GI for pathology results - scheduled 4/18/2018  EGD 3/16/2018-- Normal examined duodenum.                        - One gastric polyp. Resected and retrieved.                        - Erythematous mucosa in the gastric body, antrum                         and prepyloric region of the stomach. Biopsied.                        - 4 cm hiatal hernia.                        - LA Grade C reflux esophagitis.                        - Non-bleeding gastric ulcer with a clean ulcer                         base (Lucas Class III).    Nausea/vomiting-   CT of the abd 3/19/2018-   1. This diffuse circumferential distal esophageal wall thickening, similar to prior CT examination. Findings can be seen in the setting of esophagitis with underlying neoplasm not definitively excluded. Correlation with EGD is recommended. 2. Findings suggestive of chronic medical renal disease with innumerable subcentimeter renal hypodensities, too small to definitively characterize on CT.   3. Colonic wall thickening involving the sigmoid colon, likely relating to nondistention although clinical correlation for symptoms of colitis is recommended.   4. Cholelithiasis.   5. Multiple additional findings as above.     HTN- compliant with norvasc and coreg - HOLD BP MEDS PRIOR TO HD     ERSD- HD at [facility]. No drop in BP after HD.     HLD- compliant with  lipitor     States his niece sets up his pill box but he is very confused about his medications and when/if he takes them.     Transitional Care Note    Family and/or Caretaker present at visit?  No.  Diagnostic tests reviewed/disposition: No diagnosic tests pending after this hospitalization.  Disease/illness education: yes  Home health/community services discussion/referrals: Patient does not have home health established from hospital visit.  They do not need home health.  If needed, we will set up home health for the patient.   Establishment or re-establishment of referral orders for community resources: I have placed referral to socailwork/care manager .   Discussion with other health care providers: No discussion with other health care providers necessary.             Past Medical History:  Past Medical History:   Diagnosis Date    Amputation stump pain 9/10/2013    Aspiration pneumonia 7/27/2015    Asterixis 11/8/2016    C. difficile colitis 8/7/2015    Cholelithiasis without obstruction 8/25/2015    Chronic diastolic heart failure     2-23-17   1 - Low normal to mildly depressed left ventricular systolic function (EF 50-55%).    2 - Right ventricular enlargement with mildly depressed systolic function.    3 - Left ventricular diastolic dysfunction.    4 - Right atrial enlargement.    5 - Severe tricuspid regurgitation.    6 - Pulmonary hypertension. The estimated PA systolic pressure is 86 mmHg.    7 - Increased central venous pressure.     Chronic low back pain 12/1/2015    Closed head injury 9/8/2016    ESRD on hemodialysis 2/7/2013    MWF at [facility]    HCV antibody positive     Normal LFT as of 3/2017    Hemiparesis affecting left side as late effect of stroke 11/08/2016    History of Intracerebral Hemorrhage: L BG 5/2013; R BG 9/2016; R BG 11/2016; L caudate head 2/2017 11/2/2016    Hypertension     left basal ganglia ICH 5/2013 11/2/2016    Left Caudate Head ICH 2/22/2017 2/24/2017     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 8/1/2015    Metabolic acidosis, IAG, reduced excretion of inorganic acids     Myoclonic jerking 9/20/2016    Secondary hyperparathyroidism (of renal origin)     Secondary pulmonary hypertension 3/23/2017    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    TB lung, latent 08/25/2015    Negative Quantiferon Gold 3-23-17       Home Medications:  Prior to Admission medications    Medication Sig Start Date End Date Taking? Authorizing Provider   amLODIPine (NORVASC) 10 MG tablet Take 1 tablet (10 mg total) by mouth every morning. 10/25/17   Yvette Zelaya NP   atorvastatin (LIPITOR) 80 MG tablet Take 1 tablet (80 mg total) by mouth once daily. 10/25/17   Yvette Zelaya NP   carvedilol (COREG) 25 MG tablet Take 1 tablet (25 mg total) by mouth 2 (two) times daily with meals. 10/25/17   Yvette Zelaya NP   chlorproMAZINE (THORAZINE) 25 MG tablet Take 1 tablet (25 mg total) by mouth 3 (three) times daily. 10/25/17 10/25/18  Yvette Zelaya NP   cinacalcet (SENSIPAR) 60 MG Tab Take 1 tablet (60 mg total) by mouth daily with breakfast. 10/25/17   Yvette Zelaya NP   hydrALAZINE (APRESOLINE) 50 MG tablet Take 1 tablet (50 mg total) by mouth every 8 (eight) hours as needed (systolic blood pressure (top number) > 180). 10/25/17 10/25/18  Yvette Zelaya NP   lisinopril (PRINIVIL,ZESTRIL) 40 MG tablet Take 1 tablet (40 mg total) by mouth every evening. 10/25/17   Yvette Zelaya NP   metoclopramide HCl (REGLAN) 10 MG tablet Take 1 tablet (10 mg total) by mouth 3 (three) times daily before meals. 3/19/18 4/18/18  Rancho Carvalho PA-C   ondansetron (ZOFRAN) 8 MG tablet Take 1 tablet (8 mg total) by mouth every 8 (eight) hours as needed for Nausea. 3/6/18   Efraín [facility] MD   pantoprazole (PROTONIX) 40 MG tablet Take 1 tablet (40 mg total) by mouth 2 (two) times daily before meals. 3/17/18 3/17/19  Rancho MARQUEZ  "MITCH Carvalho   polyethylene glycol (GLYCOLAX) 17 gram PwPk Take 17 g by mouth 2 (two) times daily. 3/19/18 4/18/18  Rancho MARQUEZ MITCH Carvalho   senna-docusate 8.6-50 mg (PERICOLACE) 8.6-50 mg per tablet Take 2 tablets by mouth once daily. 3/8/18   Gvaino Cordoba MD       Review of Systems:  Review of Systems   Respiratory: Negative for cough, shortness of breath and wheezing.    Cardiovascular: Negative for chest pain and palpitations.   Gastrointestinal: Positive for blood in stool (tarry stools black ). Negative for abdominal pain, constipation, diarrhea, nausea and vomiting.   Genitourinary: Negative for difficulty urinating.   Neurological: Negative for dizziness, light-headedness and headaches.       Health Maintainence:   Immunizations:  Health Maintenance       Date Due Completion Date    TETANUS VACCINE 06/13/1982 ---    Pneumococcal PPSV23 (Medium Risk) (1) 06/13/1982 ---    Foot Exam 06/08/2018 6/8/2017    Lipid Panel 09/09/2018 9/9/2017    Eye Exam 09/13/2018 9/13/2017    Override on 9/1/2017: Done    Hemoglobin A1c 09/16/2018 3/16/2018    Colonoscopy 04/04/2027 4/4/2017           PHYSICAL EXAM     /84 (BP Location: Left arm, Patient Position: Sitting, BP Method: Large (Manual))   Pulse 80   Ht 5' 6" (1.676 m)   Wt 69.9 kg (154 lb 1.6 oz)   SpO2 98%   BMI 24.87 kg/m²     Physical Exam   Constitutional: He is oriented to person, place, and time. He appears well-developed and well-nourished.   HENT:   Head: Normocephalic.   Right Ear: External ear normal.   Left Ear: External ear normal.   Nose: Nose normal.   Mouth/Throat: Oropharynx is clear and moist. No oropharyngeal exudate.   Eyes: Pupils are equal, round, and reactive to light.   Neck: No JVD present. No tracheal deviation present. No thyromegaly present.   Cardiovascular: Normal rate, regular rhythm and intact distal pulses.  Exam reveals no gallop and no friction rub.    No murmur heard.  Pulmonary/Chest: Effort normal and breath " sounds normal. No respiratory distress. He has no wheezes. He has no rales. He exhibits no tenderness.   Abdominal: Soft. Bowel sounds are normal. He exhibits no distension. There is no tenderness.   Genitourinary:   Genitourinary Comments: Right upper arm fistula    Musculoskeletal: Normal range of motion. He exhibits no edema or tenderness.   Left AKA amputation    Lymphadenopathy:     He has no cervical adenopathy.   Neurological: He is alert and oriented to person, place, and time.   Skin: Skin is warm and dry. No rash noted.   Psychiatric: He has a normal mood and affect. His behavior is normal.   Vitals reviewed.      LABS     Lab Results   Component Value Date    HGBA1C 4.2 03/16/2018     CMP  Sodium   Date Value Ref Range Status   03/19/2018 135 (L) 136 - 145 mmol/L Final     Potassium   Date Value Ref Range Status   03/19/2018 4.0 3.5 - 5.1 mmol/L Final     Chloride   Date Value Ref Range Status   03/19/2018 103 95 - 110 mmol/L Final     CO2   Date Value Ref Range Status   03/19/2018 22 (L) 23 - 29 mmol/L Final     Glucose   Date Value Ref Range Status   03/19/2018 79 70 - 110 mg/dL Final     BUN, Bld   Date Value Ref Range Status   03/19/2018 15 6 - 20 mg/dL Final     Creatinine   Date Value Ref Range Status   03/19/2018 9.0 (H) 0.5 - 1.4 mg/dL Final     Calcium   Date Value Ref Range Status   03/19/2018 7.7 (L) 8.7 - 10.5 mg/dL Final     Total Protein   Date Value Ref Range Status   03/19/2018 7.2 6.0 - 8.4 g/dL Final     Albumin   Date Value Ref Range Status   03/19/2018 2.9 (L) 3.5 - 5.2 g/dL Final     Total Bilirubin   Date Value Ref Range Status   03/19/2018 0.5 0.1 - 1.0 mg/dL Final     Comment:     For infants and newborns, interpretation of results should be based  on gestational age, weight and in agreement with clinical  observations.  Premature Infant recommended reference ranges:  Up to 24 hours.............<8.0 mg/dL  Up to 48 hours............<12.0 mg/dL  3-5 days..................<15.0  mg/dL  6-29 days.................<15.0 mg/dL       Alkaline Phosphatase   Date Value Ref Range Status   03/19/2018 155 (H) 55 - 135 U/L Final     AST   Date Value Ref Range Status   03/19/2018 15 10 - 40 U/L Final     ALT   Date Value Ref Range Status   03/19/2018 7 (L) 10 - 44 U/L Final     Anion Gap   Date Value Ref Range Status   03/19/2018 10 8 - 16 mmol/L Final     eGFR if    Date Value Ref Range Status   03/19/2018 6.9 (A) >60 mL/min/1.73 m^2 Final     eGFR if non    Date Value Ref Range Status   03/19/2018 6.0 (A) >60 mL/min/1.73 m^2 Final     Comment:     Calculation used to obtain the estimated glomerular filtration  rate (eGFR) is the CKD-EPI equation.        Lab Results   Component Value Date    WBC 6.05 03/19/2018    HGB 9.1 (L) 03/19/2018    HCT 27.6 (L) 03/19/2018    MCV 97 03/19/2018     03/19/2018     Lab Results   Component Value Date    CHOL 161 09/09/2017    CHOL 195 07/24/2017    CHOL 128 11/02/2016     Lab Results   Component Value Date    HDL 50 09/09/2017    HDL 48 07/24/2017    HDL 33 (L) 11/02/2016     Lab Results   Component Value Date    LDLCALC 89.0 09/09/2017    LDLCALC 123.8 07/24/2017    LDLCALC 73.2 11/02/2016     Lab Results   Component Value Date    TRIG 110 09/09/2017    TRIG 116 07/24/2017    TRIG 109 11/02/2016     Lab Results   Component Value Date    CHOLHDL 31.1 09/09/2017    CHOLHDL 24.6 07/24/2017    CHOLHDL 25.8 11/02/2016     Lab Results   Component Value Date    TSH 1.273 07/24/2017       ASSESSMENT/PLAN     Vaughn Retana is a 52 y.o. male with  Past Medical History:   Diagnosis Date    Amputation stump pain 9/10/2013    Aspiration pneumonia 7/27/2015    Asterixis 11/8/2016    C. difficile colitis 8/7/2015    Cholelithiasis without obstruction 8/25/2015    Chronic diastolic heart failure     2-23-17   1 - Low normal to mildly depressed left ventricular systolic function (EF 50-55%).    2 - Right ventricular enlargement with  mildly depressed systolic function.    3 - Left ventricular diastolic dysfunction.    4 - Right atrial enlargement.    5 - Severe tricuspid regurgitation.    6 - Pulmonary hypertension. The estimated PA systolic pressure is 86 mmHg.    7 - Increased central venous pressure.     Chronic low back pain 12/1/2015    Closed head injury 9/8/2016    ESRD on hemodialysis 2/7/2013    MWF at [facility]   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    Metabolic acidosis, IAG, reduced excretion of inorganic acids     Myoclonic jerking 9/20/2016    Secondary hyperparathyroidism (of renal origin)     Secondary pulmonary hypertension 3/23/2017    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    TB lung, latent 08/25/2015    Negative Quantiferon Gold 3-23-17       Esophagitis determined by endoscopy- will repeat CBC today. Cont protonix bid bf breakfast and dinner. Reglan with meals.   -     CBC auto differential; Future; Expected date: 03/22/2018    Gastrointestinal hemorrhage, unspecified gastrointestinal hemorrhage type- check CBC. protonix bid.   -     CBC auto differential; Future; Expected date: 03/22/2018    Renovascular hypertension- at goal. Cont current meds. Low Na diet     ESRD on hemodialysis- stable. Cont HD as directed. Hold BP meds prior to dialysis.     Dyslipidemia- cont lipitor     Anemia in chronic kidney disease, on chronic dialysis- will check CBC today     History of left below knee amputation 12/18/13- stable. Uses prosthesis.     Nontraumatic intracerebral hemorrhage, unspecified cerebral location, unspecified laterality- cont  lipitor and lisinopril     Peripheral vascular disease in diabetes mellitus    Secondary hyperparathyroidism of renal origin    Discussed case with  and care manager. Will try to get blister packs from [facility] to increase compliance with meds. Will communicate visits with sister and niece     Follow up with PCP in 1 month     Patient education provided from [facility]. Patient was counseled on when and how to seek emergent care.       Yvette HERNANDEZ, VANITA, FNP-c   Department of Internal Medicine - [facility] [place]  9:37 AM

## 2018-03-23 ENCOUNTER — TELEPHONE (OUTPATIENT)
Dept: INTERNAL MEDICINE | Facility: CLINIC | Age: 53
End: 2018-03-23

## 2018-03-23 ENCOUNTER — OFFICE VISIT (OUTPATIENT)
Dept: PODIATRY | Facility: CLINIC | Age: 53
End: 2018-03-23
Payer: MEDICARE

## 2018-03-23 VITALS
DIASTOLIC BLOOD PRESSURE: 88 MMHG | SYSTOLIC BLOOD PRESSURE: 142 MMHG | HEIGHT: 66 IN | HEART RATE: 97 BPM | RESPIRATION RATE: 18 BRPM | BODY MASS INDEX: 24.27 KG/M2 | WEIGHT: 151 LBS

## 2018-03-23 DIAGNOSIS — B35.1 ONYCHOMYCOSIS DUE TO DERMATOPHYTE: ICD-10-CM

## 2018-03-23 DIAGNOSIS — Z89.512 S/P BKA (BELOW KNEE AMPUTATION) UNILATERAL, LEFT: ICD-10-CM

## 2018-03-23 DIAGNOSIS — Z99.2 ESRD ON HEMODIALYSIS: Primary | ICD-10-CM

## 2018-03-23 DIAGNOSIS — N18.6 ESRD ON HEMODIALYSIS: Primary | ICD-10-CM

## 2018-03-23 DIAGNOSIS — L84 CORN OR CALLUS: ICD-10-CM

## 2018-03-23 DIAGNOSIS — E11.51 PERIPHERAL VASCULAR DISEASE IN DIABETES MELLITUS: ICD-10-CM

## 2018-03-23 PROCEDURE — 99213 OFFICE O/P EST LOW 20 MIN: CPT | Mod: PBBFAC | Performed by: PODIATRIST

## 2018-03-23 PROCEDURE — 99999 PR PBB SHADOW E&M-EST. PATIENT-LVL III: CPT | Mod: PBBFAC,,, | Performed by: PODIATRIST

## 2018-03-23 PROCEDURE — 99499 UNLISTED E&M SERVICE: CPT | Mod: S$PBB,,, | Performed by: PODIATRIST

## 2018-03-23 PROCEDURE — 11056 PARNG/CUTG B9 HYPRKR LES 2-4: CPT | Mod: Q9,S$PBB,, | Performed by: PODIATRIST

## 2018-03-23 PROCEDURE — 11056 PARNG/CUTG B9 HYPRKR LES 2-4: CPT | Mod: Q9,PBBFAC | Performed by: PODIATRIST

## 2018-03-23 PROCEDURE — 11720 DEBRIDE NAIL 1-5: CPT | Mod: 59,Q9,PBBFAC | Performed by: PODIATRIST

## 2018-03-23 PROCEDURE — 11720 DEBRIDE NAIL 1-5: CPT | Mod: 59,Q9,S$PBB, | Performed by: PODIATRIST

## 2018-03-23 NOTE — TELEPHONE ENCOUNTER
----- Message from Yvette Zelaya NP sent at 3/23/2018  8:07 AM CDT -----  Can we set him up with an apt with Dr. Rincon in one month?  Thanks

## 2018-03-23 NOTE — PROGRESS NOTES
Subjective:      Patient ID: Vaughn Retana is a 52 y.o. male.    Chief Complaint: PCP (Yvette Zelaya NP  10/25/17); Diabetic Foot Exam; Nail Problem; Nail Care; and Callouses    Vaughn is a 52 y.o. male who presents to the clinic for evaluation and treatment of high risk feet. Vaughn has a past medical history of Amputation stump pain (9/10/2013); Aspiration pneumonia (7/27/2015); Asterixis (11/8/2016); C. 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 The patient's chief complaint is long, thick toenails. This patient has documented high risk feet requiring routine maintenance secondary to peripheral vascular disease.    PCP: Primary Doctor No    Date Last Seen by PCP:   Chief Complaint   Patient presents with    PCP     Yvette Zelaya NP  10/25/17    Diabetic Foot Exam    Nail Problem    Nail Care    Callouses         Hemoglobin A1C   Date Value Ref Range Status   03/16/2018 4.2 4.0 - 5.6 % Final     Comment:     According to ADA guidelines, hemoglobin A1c <7.0% represents  optimal control in non-pregnant diabetic patients. Different  metrics may apply to specific patient populations.    Standards of Medical Care in Diabetes-2016.  For the purpose of screening for the presence of diabetes:  <5.7%     Consistent with the absence of diabetes  5.7-6.4%  Consistent with increasing risk for diabetes   (prediabetes)  >or=6.5%  Consistent with diabetes  Currently, no consensus exists for use of hemoglobin A1c  for diagnosis of diabetes for children.  This Hemoglobin A1c assay has significant interference with fetal   hemoglobin   (HbF). The results are invalid for patients with abnormal amounts of   HbF,   including those with known Hereditary Persistence   of Fetal Hemoglobin. Heterozygous hemoglobin variants (HbAS, HbAC,   HbAD, HbAE, HbA2) do not significantly interfere with this assay;   however, presence of multiple variants in a sample may impact the %   interference.     03/07/2018 4.4 4.0 - 5.6 % Final     Comment:     According to ADA guidelines, hemoglobin A1c <7.0% represents  optimal control in non-pregnant diabetic patients. Different  metrics may apply to specific patient populations.   Standards of Medical Care in Diabetes-2016.  For the purpose of screening for the presence of diabetes:  <5.7%     Consistent with the absence of diabetes  5.7-6.4%  Consistent with increasing risk for diabetes   (prediabetes)  >or=6.5%  Consistent with diabetes  Currently, no consensus exists for use of hemoglobin A1c  for diagnosis of diabetes for children.  This Hemoglobin A1c assay has significant interference with fetal   hemoglobin   (HbF). The results are invalid for patients with abnormal amounts of   HbF,   including those with known Hereditary Persistence   of Fetal Hemoglobin. Heterozygous hemoglobin variants (HbAS, HbAC,   HbAD, HbAE, HbA2) do not significantly interfere with this assay;   however, presence of multiple variants in a sample may impact the %   interference.     09/09/2017 4.4 4.0 - 5.6 % Final     Comment:     According to ADA guidelines, hemoglobin A1c <7.0% represents  optimal  control in non-pregnant diabetic patients. Different  metrics may apply to specific patient populations.   Standards of Medical Care in Diabetes-2016.  For the purpose of screening for the presence of diabetes:  <5.7%     Consistent with the absence of diabetes  5.7-6.4%  Consistent with increasing risk for diabetes   (prediabetes)  >or=6.5%  Consistent with diabetes  Currently, no consensus exists for use of hemoglobin A1c  for diagnosis of diabetes for children.  This Hemoglobin A1c assay has significant interference with fetal   hemoglobin   (HbF). The results are invalid for patients with abnormal amounts of   HbF,   including those with known Hereditary Persistence   of Fetal Hemoglobin. Heterozygous hemoglobin variants (HbAS, HbAC,   HbAD, HbAE, HbA2) do not significantly interfere with this assay;   however, presence of multiple variants in a sample may impact the %   interference.         Review of Systems   Constitution: Negative for chills, decreased appetite and fever.   Cardiovascular: Negative for leg swelling.   Respiratory: Negative for cough.    Skin: Positive for dry skin and nail changes. Negative for flushing and itching.   Musculoskeletal: Positive for muscle weakness (s/p CVA). Negative for arthritis, joint pain, joint swelling and myalgias.   Gastrointestinal: Negative for nausea and vomiting.   Neurological: Positive for paresthesias. Negative for loss of balance.           Objective:      Physical Exam   Constitutional: He is oriented to person, place, and time. He appears well-developed and well-nourished.   Cardiovascular:   Dorsalis pedis and posterior tibial pulses are diminished R       Musculoskeletal: Normal range of motion. He exhibits no edema or tenderness.   Adequate joint range of motion without pain, limitation, nor crepitation R joints. Muscle strength is 5/5 in all groups R    S/p L BKA     Neurological: He is alert and oriented to person, place, and time.   Kinsley-Almita  5.07 monofilamant testing is diminished Elkin feet. Sharp/dull sensation diminished Bilaterally. Light touch absent Bilaterally.       Skin: Skin is warm and dry. No ecchymosis and no lesion noted. No erythema.   Nails x5 are elongated by  1-4mm's, thickened by 1-6 mm's, dystrophic, and are darkened in  coloration . Xerosis Bilaterally. No open lesions noted.    Hyperkeratotic tissue noted to medial HIPJ R, distal 2nd toe      Psychiatric: He has a normal mood and affect. His behavior is normal.   Nursing note and vitals reviewed.            Assessment:       Encounter Diagnoses   Name Primary?    ESRD on hemodialysis Yes    Peripheral vascular disease in diabetes mellitus     Onychomycosis due to dermatophyte     Corn or callus     S/P BKA (below knee amputation) unilateral, left          Plan:       Vaughn was seen today for pcp, diabetic foot exam, nail problem, nail care and callouses.    Diagnoses and all orders for this visit:    ESRD on hemodialysis    Peripheral vascular disease in diabetes mellitus    Onychomycosis due to dermatophyte    Corn or callus    S/P BKA (below knee amputation) unilateral, left      I counseled the patient on his conditions, their implications and medical management.        - Shoe inspection. Diabetic Foot Education. Patient reminded of the importance of good nutrition and blood sugar control to help prevent podiatric complications of diabetes. Patient instructed on proper foot hygeine. We discussed wearing proper shoe gear, daily foot inspections, never walking without protective shoe gear, never putting sharp instruments to feet, routine podiatric nail visits every 2-3 months.      - With patient's permission, nails were aggressively reduced and debrided x 5 to their soft tissue attachment mechanically and with electric , removing all offending nail and debris. Patient relates relief following the procedure. He will continue to monitor the areas daily, inspect his feet,  wear protective shoe gear when ambulatory, moisturizer to maintain skin integrity and follow in this office in approximately 2-3 months, sooner p.r.n.    - After cleansing the  area w/ alcohol prep pad the above mentioned hyperkeratosis was trimmed utilizing No 15 scapel, to a smooth base with out incident. Patient tolerated this  well and reported comfort to the area of R great toe , distal 2nd toe

## 2018-04-02 ENCOUNTER — TELEPHONE (OUTPATIENT)
Dept: GASTROENTEROLOGY | Facility: CLINIC | Age: 53
End: 2018-04-02

## 2018-04-02 NOTE — TELEPHONE ENCOUNTER
----- Message from Kevin De La Paz MD sent at 3/30/2018  1:53 PM CDT -----  [facility] - please tell Vaughn that his EGD pathology was benign and no dysplasia..  SPECIMEN  1) Lesser curvature gastric polyp, 6 mm.  2) Stomach, rule out H. Pylori.    FINAL PATHOLOGIC DIAGNOSIS  1. STOMACH, LESSER CURVATURE POLYP, POLYPECTOMY:  -Gastric xanthoma.  -Negative for dysplasia and malignancy.  -Immunohistochemical stains for CD68 and AE1/3 were performed with adequate controls and support the  diagnosis.  2. STOMACH, BIOPSY:  -Mild chronic gastritis with reactive changes.  -Focal intestinal metaplasia without dysplasia.  -Immunohistochemical stain for Helicobacter pylori is negative. Controls stained appropriately.  Diagnosed by: Demetrius Benjamin M.D.

## 2018-04-03 ENCOUNTER — HOSPITAL ENCOUNTER (INPATIENT)
Facility: HOSPITAL | Age: 53
LOS: 2 days | Discharge: HOME | DRG: 865 | End: 2018-04-05
Attending: EMERGENCY MEDICINE | Admitting: HOSPITALIST
Payer: MEDICARE

## 2018-04-03 DIAGNOSIS — N18.6 ESRD ON HEMODIALYSIS: Chronic | ICD-10-CM

## 2018-04-03 DIAGNOSIS — R06.02 SOB (SHORTNESS OF BREATH): ICD-10-CM

## 2018-04-03 DIAGNOSIS — Z99.2 ESRD ON HEMODIALYSIS: Chronic | ICD-10-CM

## 2018-04-03 DIAGNOSIS — R65.10 SIRS (SYSTEMIC INFLAMMATORY RESPONSE SYNDROME): ICD-10-CM

## 2018-04-03 DIAGNOSIS — N18.9 CHRONIC KIDNEY DISEASE-MINERAL AND BONE DISORDER: Chronic | ICD-10-CM

## 2018-04-03 DIAGNOSIS — E83.9 CHRONIC KIDNEY DISEASE-MINERAL AND BONE DISORDER: Chronic | ICD-10-CM

## 2018-04-03 DIAGNOSIS — M89.9 CHRONIC KIDNEY DISEASE-MINERAL AND BONE DISORDER: Chronic | ICD-10-CM

## 2018-04-03 PROBLEM — G93.6 VASOGENIC CEREBRAL EDEMA: Status: RESOLVED | Noted: 2017-07-24 | Resolved: 2018-04-03

## 2018-04-03 PROBLEM — R10.9 ABDOMINAL PAIN: Status: RESOLVED | Noted: 2018-03-18 | Resolved: 2018-04-03

## 2018-04-03 PROBLEM — R79.89 ELEVATED TROPONIN: Status: RESOLVED | Noted: 2018-03-07 | Resolved: 2018-04-03

## 2018-04-03 PROBLEM — I61.9 NONTRAUMATIC INTRACEREBRAL HEMORRHAGE, UNSPECIFIED: Status: RESOLVED | Noted: 2017-07-24 | Resolved: 2018-04-03

## 2018-04-03 PROBLEM — K21.00 REFLUX ESOPHAGITIS: Status: ACTIVE | Noted: 2017-06-24

## 2018-04-03 PROBLEM — K92.2 GASTROINTESTINAL HEMORRHAGE: Status: RESOLVED | Noted: 2018-03-15 | Resolved: 2018-04-03

## 2018-04-03 LAB
ALBUMIN SERPL BCP-MCNC: 3.1 G/DL
ALP SERPL-CCNC: 170 U/L
ALT SERPL W/O P-5'-P-CCNC: 8 U/L
ANION GAP SERPL CALC-SCNC: 11 MMOL/L
AST SERPL-CCNC: 18 U/L
BASOPHILS # BLD AUTO: 0.02 K/UL
BASOPHILS NFR BLD: 0.3 %
BILIRUB SERPL-MCNC: 0.5 MG/DL
BUN SERPL-MCNC: 17 MG/DL
CA-I BLDV-SCNC: 0.75 MMOL/L
CALCIUM SERPL-MCNC: 7.6 MG/DL
CHLORIDE SERPL-SCNC: 97 MMOL/L
CO2 SERPL-SCNC: 30 MMOL/L
CREAT SERPL-MCNC: 6.6 MG/DL
DIFFERENTIAL METHOD: ABNORMAL
EOSINOPHIL # BLD AUTO: 0.6 K/UL
EOSINOPHIL NFR BLD: 8.7 %
ERYTHROCYTE [DISTWIDTH] IN BLOOD BY AUTOMATED COUNT: 14.8 %
EST. GFR  (AFRICAN AMERICAN): 10.1 ML/MIN/1.73 M^2
EST. GFR  (NON AFRICAN AMERICAN): 8.7 ML/MIN/1.73 M^2
GLUCOSE SERPL-MCNC: 102 MG/DL
HCT VFR BLD AUTO: 27.6 %
HGB BLD-MCNC: 9.5 G/DL
IMM GRANULOCYTES # BLD AUTO: 0.02 K/UL
IMM GRANULOCYTES NFR BLD AUTO: 0.3 %
LACTATE SERPL-SCNC: 0.9 MMOL/L
LACTATE SERPL-SCNC: 1.8 MMOL/L
LYMPHOCYTES # BLD AUTO: 0.7 K/UL
LYMPHOCYTES NFR BLD: 9 %
MCH RBC QN AUTO: 32.5 PG
MCHC RBC AUTO-ENTMCNC: 34.4 G/DL
MCV RBC AUTO: 95 FL
MONOCYTES # BLD AUTO: 1 K/UL
MONOCYTES NFR BLD: 13.8 %
NEUTROPHILS # BLD AUTO: 5 K/UL
NEUTROPHILS NFR BLD: 67.9 %
NRBC BLD-RTO: 0 /100 WBC
PLATELET # BLD AUTO: 187 K/UL
PMV BLD AUTO: 12.6 FL
POTASSIUM SERPL-SCNC: 2.9 MMOL/L
PROCALCITONIN SERPL IA-MCNC: 0.39 NG/ML
PROT SERPL-MCNC: 7.5 G/DL
RBC # BLD AUTO: 2.92 M/UL
SODIUM SERPL-SCNC: 138 MMOL/L
WBC # BLD AUTO: 7.32 K/UL

## 2018-04-03 PROCEDURE — 36600 WITHDRAWAL OF ARTERIAL BLOOD: CPT

## 2018-04-03 PROCEDURE — 25000003 PHARM REV CODE 250: Performed by: STUDENT IN AN ORGANIZED HEALTH CARE EDUCATION/TRAINING PROGRAM

## 2018-04-03 PROCEDURE — 80053 COMPREHEN METABOLIC PANEL: CPT

## 2018-04-03 PROCEDURE — 82330 ASSAY OF CALCIUM: CPT

## 2018-04-03 PROCEDURE — 96375 TX/PRO/DX INJ NEW DRUG ADDON: CPT | Mod: 59

## 2018-04-03 PROCEDURE — 96365 THER/PROPH/DIAG IV INF INIT: CPT | Mod: 59

## 2018-04-03 PROCEDURE — 85025 COMPLETE CBC W/AUTO DIFF WBC: CPT

## 2018-04-03 PROCEDURE — 83605 ASSAY OF LACTIC ACID: CPT | Mod: 91

## 2018-04-03 PROCEDURE — 81002 URINALYSIS NONAUTO W/O SCOPE: CPT

## 2018-04-03 PROCEDURE — 25000003 PHARM REV CODE 250: Performed by: EMERGENCY MEDICINE

## 2018-04-03 PROCEDURE — 82803 BLOOD GASES ANY COMBINATION: CPT

## 2018-04-03 PROCEDURE — 83605 ASSAY OF LACTIC ACID: CPT

## 2018-04-03 PROCEDURE — 87040 BLOOD CULTURE FOR BACTERIA: CPT

## 2018-04-03 PROCEDURE — 93010 ELECTROCARDIOGRAM REPORT: CPT | Mod: ,,, | Performed by: INTERNAL MEDICINE

## 2018-04-03 PROCEDURE — 84145 PROCALCITONIN (PCT): CPT

## 2018-04-03 PROCEDURE — 96368 THER/DIAG CONCURRENT INF: CPT | Mod: 59

## 2018-04-03 PROCEDURE — 96366 THER/PROPH/DIAG IV INF ADDON: CPT | Mod: 59

## 2018-04-03 PROCEDURE — 99285 EMERGENCY DEPT VISIT HI MDM: CPT | Mod: 25

## 2018-04-03 PROCEDURE — 63600175 PHARM REV CODE 636 W HCPCS: Performed by: EMERGENCY MEDICINE

## 2018-04-03 PROCEDURE — 63600175 PHARM REV CODE 636 W HCPCS: Performed by: STUDENT IN AN ORGANIZED HEALTH CARE EDUCATION/TRAINING PROGRAM

## 2018-04-03 PROCEDURE — 99900035 HC TECH TIME PER 15 MIN (STAT)

## 2018-04-03 PROCEDURE — 93005 ELECTROCARDIOGRAM TRACING: CPT

## 2018-04-03 PROCEDURE — 20600001 HC STEP DOWN PRIVATE ROOM

## 2018-04-03 PROCEDURE — 96372 THER/PROPH/DIAG INJ SC/IM: CPT

## 2018-04-03 RX ORDER — CINACALCET 30 MG/1
60 TABLET, FILM COATED ORAL
Status: DISCONTINUED | OUTPATIENT
Start: 2018-04-04 | End: 2018-04-04

## 2018-04-03 RX ORDER — NALOXONE HCL 0.4 MG/ML
0.4 VIAL (ML) INJECTION ONCE
Status: COMPLETED | OUTPATIENT
Start: 2018-04-03 | End: 2018-04-03

## 2018-04-03 RX ORDER — ACETAMINOPHEN 325 MG/1
650 TABLET ORAL EVERY 4 HOURS PRN
Status: DISCONTINUED | OUTPATIENT
Start: 2018-04-03 | End: 2018-04-04

## 2018-04-03 RX ORDER — HEPARIN SODIUM 5000 [USP'U]/ML
5000 INJECTION, SOLUTION INTRAVENOUS; SUBCUTANEOUS EVERY 8 HOURS
Status: DISCONTINUED | OUTPATIENT
Start: 2018-04-03 | End: 2018-04-05 | Stop reason: HOSPADM

## 2018-04-03 RX ORDER — ACETAMINOPHEN 500 MG
1000 TABLET ORAL
Status: DISCONTINUED | OUTPATIENT
Start: 2018-04-03 | End: 2018-04-03

## 2018-04-03 RX ORDER — POLYETHYLENE GLYCOL 3350 17 G/17G
17 POWDER, FOR SOLUTION ORAL DAILY
Status: DISCONTINUED | OUTPATIENT
Start: 2018-04-04 | End: 2018-04-05 | Stop reason: HOSPADM

## 2018-04-03 RX ORDER — CHLORPROMAZINE HYDROCHLORIDE 25 MG/1
25 TABLET, FILM COATED ORAL 3 TIMES DAILY
Status: DISCONTINUED | OUTPATIENT
Start: 2018-04-03 | End: 2018-04-03

## 2018-04-03 RX ORDER — LISINOPRIL 20 MG/1
40 TABLET ORAL NIGHTLY
Status: DISCONTINUED | OUTPATIENT
Start: 2018-04-03 | End: 2018-04-05 | Stop reason: HOSPADM

## 2018-04-03 RX ORDER — PANTOPRAZOLE SODIUM 40 MG/1
40 TABLET, DELAYED RELEASE ORAL
Status: DISCONTINUED | OUTPATIENT
Start: 2018-04-04 | End: 2018-04-05 | Stop reason: HOSPADM

## 2018-04-03 RX ORDER — ATORVASTATIN CALCIUM 20 MG/1
80 TABLET, FILM COATED ORAL DAILY
Status: DISCONTINUED | OUTPATIENT
Start: 2018-04-04 | End: 2018-04-05 | Stop reason: HOSPADM

## 2018-04-03 RX ORDER — SODIUM CHLORIDE 0.9 % (FLUSH) 0.9 %
3 SYRINGE (ML) INJECTION
Status: DISCONTINUED | OUTPATIENT
Start: 2018-04-03 | End: 2018-04-05 | Stop reason: HOSPADM

## 2018-04-03 RX ORDER — AMOXICILLIN 250 MG
1 CAPSULE ORAL 2 TIMES DAILY
Status: DISCONTINUED | OUTPATIENT
Start: 2018-04-04 | End: 2018-04-05 | Stop reason: HOSPADM

## 2018-04-03 RX ORDER — AMLODIPINE BESYLATE 10 MG/1
10 TABLET ORAL EVERY MORNING
Status: DISCONTINUED | OUTPATIENT
Start: 2018-04-04 | End: 2018-04-04

## 2018-04-03 RX ORDER — CARVEDILOL 25 MG/1
25 TABLET ORAL 2 TIMES DAILY WITH MEALS
Status: DISCONTINUED | OUTPATIENT
Start: 2018-04-04 | End: 2018-04-04

## 2018-04-03 RX ORDER — ONDANSETRON 4 MG/1
8 TABLET, FILM COATED ORAL EVERY 8 HOURS PRN
Status: DISCONTINUED | OUTPATIENT
Start: 2018-04-03 | End: 2018-04-05 | Stop reason: HOSPADM

## 2018-04-03 RX ORDER — ACETAMINOPHEN 650 MG/1
650 SUPPOSITORY RECTAL
Status: COMPLETED | OUTPATIENT
Start: 2018-04-03 | End: 2018-04-03

## 2018-04-03 RX ORDER — VANCOMYCIN/0.9 % SOD CHLORIDE 1 G/100 ML
1000 PLASTIC BAG, INJECTION (ML) INTRAVENOUS
Status: COMPLETED | OUTPATIENT
Start: 2018-04-03 | End: 2018-04-03

## 2018-04-03 RX ORDER — METRONIDAZOLE 500 MG/100ML
500 INJECTION, SOLUTION INTRAVENOUS
Status: DISCONTINUED | OUTPATIENT
Start: 2018-04-03 | End: 2018-04-03

## 2018-04-03 RX ORDER — PROMETHAZINE HYDROCHLORIDE 12.5 MG/1
25 TABLET ORAL EVERY 6 HOURS PRN
Status: DISCONTINUED | OUTPATIENT
Start: 2018-04-03 | End: 2018-04-05 | Stop reason: HOSPADM

## 2018-04-03 RX ADMIN — HEPARIN SODIUM 5000 UNITS: 5000 INJECTION, SOLUTION INTRAVENOUS; SUBCUTANEOUS at 09:04

## 2018-04-03 RX ADMIN — LISINOPRIL 40 MG: 20 TABLET ORAL at 09:04

## 2018-04-03 RX ADMIN — POTASSIUM BICARBONATE 50 MEQ: 25 TABLET, EFFERVESCENT ORAL at 09:04

## 2018-04-03 RX ADMIN — Medication 1 G: at 04:04

## 2018-04-03 RX ADMIN — NALOXONE HYDROCHLORIDE 0.4 MG: 0.4 INJECTION, SOLUTION INTRAMUSCULAR; INTRAVENOUS; SUBCUTANEOUS at 09:04

## 2018-04-03 RX ADMIN — CEFTRIAXONE SODIUM 2 G: 2 INJECTION, POWDER, FOR SOLUTION INTRAMUSCULAR; INTRAVENOUS at 05:04

## 2018-04-03 RX ADMIN — SODIUM CHLORIDE 2055 ML: 0.9 INJECTION, SOLUTION INTRAVENOUS at 05:04

## 2018-04-03 RX ADMIN — ACETAMINOPHEN 650 MG: 650 SUPPOSITORY RECTAL at 04:04

## 2018-04-03 NOTE — ED TRIAGE NOTES
"Vaughn Retana, a 52 y.o. male presents to the ED presents to ED from dialysis for AMS.  Pt was in dialysis and they stated that he has been acting differently.  Usually AAOx4. Pt very confused and lethargic      Chief Complaint   Patient presents with    Altered Mental Status     EMS states "the dialysis nurse thinks he's under the influence"/ EMS reports non-compliance with dialysis, lethargic, confused-      Review of patient's allergies indicates:   Allergen Reactions    Fosrenol [lanthanum] Nausea And Vomiting     Nausea and vomiting     Past Medical History:   Diagnosis Date    Amputation stump pain 9/10/2013    Aspiration pneumonia 7/27/2015    Asterixis 11/8/2016    C. difficile colitis 8/7/2015    Cholelithiasis without obstruction 8/25/2015    Chronic diastolic heart failure     2-23-17   1 - Low normal to mildly depressed left ventricular systolic function (EF 50-55%).    2 - Right ventricular enlargement with mildly depressed systolic function.    3 - Left ventricular diastolic dysfunction.    4 - Right atrial enlargement.    5 - Severe tricuspid regurgitation.    6 - Pulmonary hypertension. The estimated PA systolic pressure is 86 mmHg.    7 - Increased central venous pressure.    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 The patient is awake, alert and aware of environment with an appropriate affect, the patient is oriented x 3 and speaking appropriately.   APPEARANCE: Patient resting comfortably, patient is clean and well groomed, patient's clothing is properly fastened.  SKIN: The skin is warm and dry, color consistent with ethnicity, patient has normal skin turgor and moist mucus membranes, skin intact, no breakdown or bruising noted.  MUSCULOSKELETAL: Patient moving extremities well, no obvious swelling or deformities noted. BKA or L leg  RESPIRATORY: Respirations are spontaneous, patient has a normal effort and rate, no accessory muscle use noted.  CARDIAC: Patient has a sinus tach rhythm per EKG, no periphreal edema noted, capillary refill < 3 seconds.  ABDOMEN: Soft and non tender to palpation, no distention noted. Bowel sounds present in all four quadrants.   NEUROLOGIC: Eyes open spontaneously, follows commands, bilateral hand grasp equal and even, purposeful motor response noted.   HEENT:  Pin point pupils Dr. Sparks notified.

## 2018-04-03 NOTE — ED PROVIDER NOTES
"Encounter Date: 4/3/2018    SCRIBE #1 NOTE: I, Renetta Mccarty, am scribing for, and in the presence of,  Dr. Sparks. I have scribed the following portions of the note - Other sections scribed: HPI, ROS, PE.       History     Chief Complaint   Patient presents with    Altered Mental Status     EMS states "the dialysis nurse thinks he's under the influence"/ EMS reports non-compliance with dialysis, lethargic, confused-      Time patient was seen by the provider: 3:44 PM      The patient is a 52 Y.O. male with co-morbidities including: hypertension who was brought into the ED by EMS with a complaint of AMS. EMS personal states that the pt was not complaint with dialysis today. EMS states the pt was confused, febrile, and vomited on the dialysis machine. The pt signed an AMA and stopped his dialysis treatment. Pt is currently febrile in the ED with a temperature of 103.1 F. Pt complains of body aches, lower back pain, cough and diarrhea, but denies any dysuria and sore throat.      The history is provided by the EMS personnel, the patient and medical records.     Review of patient's allergies indicates:   Allergen Reactions    Fosrenol [lanthanum] Nausea And Vomiting     Nausea and vomiting     Past Medical History:   Diagnosis Date    Amputation stump pain 9/10/2013    Aspiration pneumonia 7/27/2015    Asterixis 11/8/2016    C. difficile colitis 8/7/2015    Cholelithiasis without obstruction 8/25/2015    Chronic diastolic heart failure     2-23-17   1 - Low normal to mildly depressed left ventricular systolic function (EF 50-55%).    2 - Right ventricular enlargement with mildly depressed systolic function.    3 - Left ventricular diastolic dysfunction.    4 - Right atrial enlargement.    5 - Severe tricuspid regurgitation.    6 - Pulmonary hypertension. The estimated PA systolic pressure is 86 mmHg.    7 - Increased central venous pressure.    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    Metabolic acidosis, IAG, reduced excretion of inorganic acids     Myoclonic jerking 9/20/2016    Secondary hyperparathyroidism (of renal origin)     Secondary pulmonary hypertension 3/23/2017    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    TB lung, latent 08/25/2015    Negative Quantiferon Gold 3-23-17     Past Surgical History:   Procedure Laterality Date    COLONOSCOPY      COLONOSCOPY N/A 4/4/2017    Procedure: COLONOSCOPY;  Surgeon: Walker Stern MD;  Location: [facility] ([address] [place] [address]);  Service: Endoscopy;  Laterality: N/A;  PA Systolic Pressure 85.56. HD Patient MWF, K+ lab prior to procedure.     FOOT AMPUTATION THROUGH METATARSAL      left foot    LEG AMPUTATION THROUGH KNEE  12/18/2013    left BKA    R AVF  9/12/12     Family History   Problem Relation Age of Onset    Early death Mother     Kidney disease Father     Hypertension Father     Heart disease Father     Hyperlipidemia Father     Diabetes Brother     Alcohol abuse Maternal Grandmother     Diabetes Maternal Grandfather     Hypertension Sister     Melanoma Neg Hx      Social History   Substance Use Topics    Smoking status: Former Smoker     Packs/day: 1.00     Years: 10.00    Smokeless tobacco: Never Used    Alcohol use No     Review of Systems   Constitutional: Positive for fever.   HENT: Negative for sore throat.    Respiratory: Negative for chest tightness and shortness of breath.    Cardiovascular: Negative for chest pain.   Gastrointestinal: Positive for  diarrhea and vomiting. Negative for abdominal pain.   Genitourinary: Negative for dysuria.   Musculoskeletal: Positive for back pain.   Skin: Negative for pallor, rash and wound.   Neurological: Negative for light-headedness and headaches.       Physical Exam     Initial Vitals [04/03/18 1521]   BP Pulse Resp Temp SpO2   (!) 186/101 (!) 112 19 (!) 101.2 °F (38.4 °C) 98 %      MAP       129.33         Physical Exam    Nursing note and vitals reviewed.  Constitutional:   Chronically ill black male who is awake, but slightly lethargic, arousable, but not oriented.    HENT:   Head: Normocephalic and atraumatic.   Eyes: EOM are normal. Pupils are equal, round, and reactive to light.   Neck: Normal range of motion. Neck supple.   Cardiovascular:   3/6 diastolic murmur. 2/6 ejection murmur.    Pulmonary/Chest:   Scattered rales bilaterally.   Abdominal: Soft. Bowel sounds are normal.   Musculoskeletal: Normal range of motion. He exhibits no edema or tenderness.   Prosthetic in place on the LLE.    Neurological: He is alert and oriented to person, place, and time.   Skin: Skin is warm and dry.         ED Course   Procedures  Labs Reviewed   CBC W/ AUTO DIFFERENTIAL - Abnormal; Notable for the following:        Result Value    RBC 2.92 (*)     Hemoglobin 9.5 (*)     Hematocrit 27.6 (*)     MCH 32.5 (*)     RDW 14.8 (*)     Lymph # 0.7 (*)     Eos # 0.6 (*)     Lymph% 9.0 (*)     Eosinophil% 8.7 (*)     All other components within normal limits   CULTURE, BLOOD   CULTURE, BLOOD   CULTURE, URINE   LACTIC ACID, PLASMA   URINALYSIS, REFLEX TO URINE CULTURE   TOXICOLOGY SCREEN, URINE, RANDOM (COMPLIANCE)   LACTIC ACID, PLASMA   COMPREHENSIVE METABOLIC PANEL     EKG Readings: (Independently Interpreted)   EKG reveals sinus tachycardia with a rate of 104 PA interval 160 QRS duration 100 QTc 507 normal ST segments          Medical Decision Making:   History:   Old Medical Records: I decided to obtain old medical records.  Initial  Assessment:   Patient is a 52-year-old black male with a history of end-stage renal disease who presents for evaluation or referral from the dialysis unit for decreased level of consciousness.  Patient's only complaints are that of some recent nausea and vomiting a slight cough and some lower lumbar pain.  Patient did have an episode of vomiting while on dialysis at the dialysis unit after which he signed out for the remainder of his treatment and was referred to the ED.  Patient is probably normally alert and oriented very talkative during dialysis.  Differential Diagnosis:   Bacteremia, sepsis, UTI, pneumonia, undiagnosed metabolic disorder,  Clinical Tests:   Lab Tests: Ordered and Reviewed  Radiological Study: Ordered and Reviewed  Medical Tests: Ordered and Reviewed  Laboratory for evaluation revealed a normal white blood cell count urinalysis pending chest x-ray was read as normal by me.  Patient be treated by sepsis protocol and admitted for observation pending cultures            Scribe Attestation:   Scribe #1: I performed the above scribed service and the documentation accurately describes the services I performed. I attest to the accuracy of the note.               Clinical Impression:   The encounter diagnosis was SOB (shortness of breath).                           Quang Sparks MD  04/03/18 9701

## 2018-04-04 LAB
25(OH)D3+25(OH)D2 SERPL-MCNC: 21 NG/ML
ALBUMIN SERPL BCP-MCNC: 2.9 G/DL
ALLENS TEST: ABNORMAL
ALP SERPL-CCNC: 177 U/L
ALT SERPL W/O P-5'-P-CCNC: 11 U/L
ANION GAP SERPL CALC-SCNC: 15 MMOL/L
AST SERPL-CCNC: 28 U/L
BASOPHILS # BLD AUTO: 0.02 K/UL
BASOPHILS NFR BLD: 0.4 %
BILIRUB SERPL-MCNC: 0.3 MG/DL
BUN SERPL-MCNC: 20 MG/DL
CALCIUM SERPL-MCNC: 7.6 MG/DL
CHLORIDE SERPL-SCNC: 99 MMOL/L
CO2 SERPL-SCNC: 24 MMOL/L
CREAT SERPL-MCNC: 7.7 MG/DL
DELSYS: ABNORMAL
DIFFERENTIAL METHOD: ABNORMAL
EOSINOPHIL # BLD AUTO: 0.6 K/UL
EOSINOPHIL NFR BLD: 10.5 %
ERYTHROCYTE [DISTWIDTH] IN BLOOD BY AUTOMATED COUNT: 14.9 %
ERYTHROCYTE [SEDIMENTATION RATE] IN BLOOD BY WESTERGREN METHOD: 16 MM/H
EST. GFR  (AFRICAN AMERICAN): 8.4 ML/MIN/1.73 M^2
EST. GFR  (NON AFRICAN AMERICAN): 7.3 ML/MIN/1.73 M^2
FIO2: 21
FLUAV AG SPEC QL IA: NEGATIVE
FLUBV AG SPEC QL IA: NEGATIVE
GLUCOSE SERPL-MCNC: 104 MG/DL
HCO3 UR-SCNC: 32.5 MMOL/L (ref 24–28)
HCT VFR BLD AUTO: 26.1 %
HGB BLD-MCNC: 8.7 G/DL
IMM GRANULOCYTES # BLD AUTO: 0.02 K/UL
IMM GRANULOCYTES NFR BLD AUTO: 0.4 %
LYMPHOCYTES # BLD AUTO: 0.9 K/UL
LYMPHOCYTES NFR BLD: 16.7 %
MAGNESIUM SERPL-MCNC: 1.9 MG/DL
MCH RBC QN AUTO: 33 PG
MCHC RBC AUTO-ENTMCNC: 33.3 G/DL
MCV RBC AUTO: 99 FL
MODE: ABNORMAL
MONOCYTES # BLD AUTO: 0.7 K/UL
MONOCYTES NFR BLD: 13.1 %
NEUTROPHILS # BLD AUTO: 3.2 K/UL
NEUTROPHILS NFR BLD: 58.9 %
NRBC BLD-RTO: 0 /100 WBC
PCO2 BLDA: 46.7 MMHG (ref 35–45)
PH SMN: 7.45 [PH] (ref 7.35–7.45)
PHOSPHATE SERPL-MCNC: 3.8 MG/DL
PLATELET # BLD AUTO: 149 K/UL
PMV BLD AUTO: 11.8 FL
PO2 BLDA: 77 MMHG (ref 80–100)
POC BE: 9 MMOL/L
POC SATURATED O2: 96 % (ref 95–100)
POC TCO2: 34 MMOL/L (ref 23–27)
POTASSIUM SERPL-SCNC: 3.6 MMOL/L
PROT SERPL-MCNC: 7.4 G/DL
PTH-INTACT SERPL-MCNC: 667 PG/ML
RBC # BLD AUTO: 2.64 M/UL
SAMPLE: ABNORMAL
SITE: ABNORMAL
SODIUM SERPL-SCNC: 138 MMOL/L
SPECIMEN SOURCE: NORMAL
T4 FREE SERPL-MCNC: 1.14 NG/DL
TSH SERPL DL<=0.005 MIU/L-ACNC: 0.09 UIU/ML
VIT B12 SERPL-MCNC: 1010 PG/ML
WBC # BLD AUTO: 5.44 K/UL

## 2018-04-04 PROCEDURE — 82306 VITAMIN D 25 HYDROXY: CPT

## 2018-04-04 PROCEDURE — 86703 HIV-1/HIV-2 1 RESULT ANTBDY: CPT

## 2018-04-04 PROCEDURE — 63600175 PHARM REV CODE 636 W HCPCS: Performed by: STUDENT IN AN ORGANIZED HEALTH CARE EDUCATION/TRAINING PROGRAM

## 2018-04-04 PROCEDURE — 83735 ASSAY OF MAGNESIUM: CPT

## 2018-04-04 PROCEDURE — 84443 ASSAY THYROID STIM HORMONE: CPT

## 2018-04-04 PROCEDURE — 99223 1ST HOSP IP/OBS HIGH 75: CPT | Mod: ,,, | Performed by: HOSPITALIST

## 2018-04-04 PROCEDURE — 85025 COMPLETE CBC W/AUTO DIFF WBC: CPT

## 2018-04-04 PROCEDURE — 82607 VITAMIN B-12: CPT

## 2018-04-04 PROCEDURE — 80053 COMPREHEN METABOLIC PANEL: CPT

## 2018-04-04 PROCEDURE — 25000003 PHARM REV CODE 250: Performed by: STUDENT IN AN ORGANIZED HEALTH CARE EDUCATION/TRAINING PROGRAM

## 2018-04-04 PROCEDURE — 20600001 HC STEP DOWN PRIVATE ROOM

## 2018-04-04 PROCEDURE — 87632 RESP VIRUS 6-11 TARGETS: CPT

## 2018-04-04 PROCEDURE — 99233 SBSQ HOSP IP/OBS HIGH 50: CPT | Mod: ,,, | Performed by: INTERNAL MEDICINE

## 2018-04-04 PROCEDURE — 83970 ASSAY OF PARATHORMONE: CPT

## 2018-04-04 PROCEDURE — 87400 INFLUENZA A/B EACH AG IA: CPT | Mod: 59

## 2018-04-04 PROCEDURE — 36415 COLL VENOUS BLD VENIPUNCTURE: CPT

## 2018-04-04 PROCEDURE — 84439 ASSAY OF FREE THYROXINE: CPT

## 2018-04-04 PROCEDURE — 84100 ASSAY OF PHOSPHORUS: CPT

## 2018-04-04 RX ORDER — BENZONATATE 100 MG/1
100 CAPSULE ORAL 3 TIMES DAILY PRN
Status: DISCONTINUED | OUTPATIENT
Start: 2018-04-04 | End: 2018-04-05 | Stop reason: HOSPADM

## 2018-04-04 RX ORDER — OXYCODONE HYDROCHLORIDE 5 MG/1
5 TABLET ORAL EVERY 6 HOURS PRN
Status: DISCONTINUED | OUTPATIENT
Start: 2018-04-04 | End: 2018-04-05 | Stop reason: HOSPADM

## 2018-04-04 RX ORDER — ACETAMINOPHEN 325 MG/1
650 TABLET ORAL EVERY 4 HOURS PRN
Status: DISCONTINUED | OUTPATIENT
Start: 2018-04-04 | End: 2018-04-05 | Stop reason: HOSPADM

## 2018-04-04 RX ADMIN — HEPARIN SODIUM 5000 UNITS: 5000 INJECTION, SOLUTION INTRAVENOUS; SUBCUTANEOUS at 02:04

## 2018-04-04 RX ADMIN — HEPARIN SODIUM 5000 UNITS: 5000 INJECTION, SOLUTION INTRAVENOUS; SUBCUTANEOUS at 09:04

## 2018-04-04 RX ADMIN — HEPARIN SODIUM 5000 UNITS: 5000 INJECTION, SOLUTION INTRAVENOUS; SUBCUTANEOUS at 06:04

## 2018-04-04 RX ADMIN — CINACALCET HYDROCHLORIDE 60 MG: 30 TABLET, COATED ORAL at 08:04

## 2018-04-04 RX ADMIN — LISINOPRIL 40 MG: 20 TABLET ORAL at 09:04

## 2018-04-04 RX ADMIN — CALCIUM GLUCONATE 1000 MG: 98 INJECTION, SOLUTION INTRAVENOUS at 01:04

## 2018-04-04 RX ADMIN — POLYETHYLENE GLYCOL 3350 17 G: 17 POWDER, FOR SOLUTION ORAL at 10:04

## 2018-04-04 RX ADMIN — AMLODIPINE BESYLATE 10 MG: 10 TABLET ORAL at 06:04

## 2018-04-04 RX ADMIN — PANTOPRAZOLE SODIUM 40 MG: 40 TABLET, DELAYED RELEASE ORAL at 05:04

## 2018-04-04 RX ADMIN — ATORVASTATIN CALCIUM 80 MG: 20 TABLET, FILM COATED ORAL at 10:04

## 2018-04-04 RX ADMIN — CARVEDILOL 25 MG: 25 TABLET, FILM COATED ORAL at 08:04

## 2018-04-04 RX ADMIN — STANDARDIZED SENNA CONCENTRATE AND DOCUSATE SODIUM 1 TABLET: 8.6; 5 TABLET, FILM COATED ORAL at 10:04

## 2018-04-04 RX ADMIN — PANTOPRAZOLE SODIUM 40 MG: 40 TABLET, DELAYED RELEASE ORAL at 06:04

## 2018-04-04 NOTE — H&P
"[facility] Hospital Medicine  History & Physical    Patient Name: Vaughn Retana  MRN: [id number]  Admission Date: 4/3/2018  Attending Physician: Deysi Alba MD   Primary Care Provider: [facility] Medicine Team: [facility]OSP MED 2 Michael Cabral MD     Patient information was obtained from patient, relative(s), EMS personnel and ER records.     Subjective:     Principal Problem:SIRS (systemic inflammatory response syndrome)    Chief Complaint:   Chief Complaint   Patient presents with    Altered Mental Status     EMS states "the dialysis nurse thinks he's under the influence"/ EMS reports non-compliance with dialysis, lethargic, confused-         HPI: Vaughn Retana is a 52 year old male with a medical history significant for reflux esophagitis (repeated admissions for hematemesis and upper endoscopies), ESRD (MWF dialysis - 2/2 to HTN and type II DM), hx ICH (also multiple admissions, presumed from HTN), and left BKA (12/18/2013), who presents to the ED on 4/3 from dialysis center due to altered mental status. Per EMS, dialysis nurse was concerned patient was "under the influence." Dialysis session ended early as patient was not acting himself, febrile, and vomited on machine. History gathered largely from chart review and interview with patient's niece over the phone as the patient is mostly unresponsive, waxes and wanes, and not engaged during interview. According to patient's niece, Daisy, Mr. Retana had been doing well the last time he had seen him yesterday. She states that up until last year, he was living by himself and she took her uncle in on behest of his PCP who stated that patient was unable to take care of himself properly. The patient frequently leaves at odd hours and takes the bus to see his friends in [place], sometimes before anyone gets up. This was the case this morning; when she went to wake up him up at 6 am he was gone and had left to see his " "friends whom she says she does not know and find to be suspicious (as does patient's sister Alicia, whom I also talked to on the phone). Unclear if her uncle or friend have history of substance abuse.  Niece also mentions that uncle woke her up Monday morning at 6 am, banging on the door, after being up at unknown location overnight. At the time, she did not notice anything awry with his health or mentation. That day, he missed his dialysis due to missed transportation. Niece rescheduled for today (4/3), but uncle stated to him that he had a ride and later arrived to his dialysis center in the state described above. She notes that the  patient has terrible eating habits (unhealthy, greasy foods) as well as poor hand hygiene. Patient used to be a heavy drinker but stopped several years ago. From what I am able to gather from the patient himself, he states he endorses nausea, body aches, some mild abdominal pain. Denies diarrhea to me (although endorsed it to the ED physician). He states he still produces "a little urine." Denies dysuria, chest pain, shortness of breath.    On arrival to ED, his HR was 112, temperature was 101.2F, and /101. He had intermittent dry coughing spells but was sat'ing well on room air. CTH unremarkable for acute intracranial abnormalities such as hemorrhages as seen in past. CXR and LA were both unremarkable. Given rectal acetaminophen and dose of IV ceftriaxone and vancomycin as well as 30 cc/kg fluid bolus (~2L). Fever and tachycardia resolved thereafter. Patient without significant LA or leukocytosis. Pro-calcitonin minimally elevated at 0.4.    Past Medical History:   Diagnosis Date    Amputation stump pain 9/10/2013    Aspiration pneumonia 7/27/2015    Asterixis 11/8/2016    C. difficile colitis 8/7/2015    Cholelithiasis without obstruction 8/25/2015    Chronic diastolic heart failure     2-23-17   1 - Low normal to mildly depressed left ventricular systolic function " (EF 50-55%).    2 - Right ventricular enlargement with mildly depressed systolic function.    3 - Left ventricular diastolic dysfunction.    4 - Right atrial enlargement.    5 - Severe tricuspid regurgitation.    6 - Pulmonary hypertension. The estimated PA systolic pressure is 86 mmHg.    7 - Increased central venous pressure.    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 HD Patient MWF, K+ lab prior to procedure.     FOOT AMPUTATION THROUGH METATARSAL      left foot    LEG AMPUTATION THROUGH KNEE  12/18/2013    left BKA    R AVF  9/12/12       Review of patient's allergies indicates:   Allergen Reactions    Fosrenol [lanthanum] Nausea And Vomiting     Nausea and vomiting       No current facility-administered medications on file prior to encounter.      Current Outpatient Prescriptions on File Prior to Encounter   Medication Sig    amLODIPine (NORVASC) 10 MG  tablet Take 1 tablet (10 mg total) by mouth every morning.    atorvastatin (LIPITOR) 80 MG tablet Take 1 tablet (80 mg total) by mouth once daily.    carvedilol (COREG) 25 MG tablet Take 1 tablet (25 mg total) by mouth 2 (two) times daily with meals.    chlorproMAZINE (THORAZINE) 25 MG tablet Take 1 tablet (25 mg total) by mouth 3 (three) times daily.    cinacalcet (SENSIPAR) 60 MG Tab Take 1 tablet (60 mg total) by mouth daily with breakfast.    hydrALAZINE (APRESOLINE) 50 MG tablet Take 1 tablet (50 mg total) by mouth every 8 (eight) hours as needed (systolic blood pressure (top number) > 180).    lisinopril (PRINIVIL,ZESTRIL) 40 MG tablet Take 1 tablet (40 mg total) by mouth every evening.    metoclopramide HCl (REGLAN) 10 MG tablet Take 1 tablet (10 mg total) by mouth 3 (three) times daily before meals.    ondansetron (ZOFRAN) 8 MG tablet Take 1 tablet (8 mg total) by mouth every 8 (eight) hours as needed for Nausea.    pantoprazole (PROTONIX) 40 MG tablet Take 1 tablet (40 mg total) by mouth 2 (two) times daily before meals.    polyethylene glycol (GLYCOLAX) 17 gram PwPk Take 17 g by mouth 2 (two) times daily.    senna-docusate 8.6-50 mg (PERICOLACE) 8.6-50 mg per tablet Take 2 tablets by mouth once daily.     Family History     Problem Relation (Age of Onset)    Alcohol abuse Maternal Grandmother    Diabetes Brother, Maternal Grandfather    Early death Mother    Heart disease Father    Hyperlipidemia Father    Hypertension Father, Sister    Kidney disease Father        Social History Main Topics    Smoking status: Former Smoker     Packs/day: 1.00     Years: 10.00    Smokeless tobacco: Never Used    Alcohol use No    Drug use: No    Sexual activity: Yes     Partners: Female     Birth control/ protection: None     Review of Systems   Unable to perform ROS: Patient unresponsive   Constitutional: Positive for activity change, fatigue and fever.   HENT: Negative for congestion.    Respiratory:  Positive for cough.    Cardiovascular: Negative for chest pain.   Gastrointestinal: Positive for abdominal pain, diarrhea, nausea and vomiting.   Genitourinary: Positive for decreased urine volume.   Musculoskeletal: Positive for arthralgias and back pain.   Skin: Negative for color change and rash.   Neurological: Positive for weakness.   Psychiatric/Behavioral: Positive for confusion.     Objective:     Vital Signs (Most Recent):  Temp: (!) 101.6 °F (38.7 °C) (04/03/18 1848)  Pulse: 96 (04/03/18 1831)  Resp: 18 (04/03/18 1831)  BP: (!) 157/78 (04/03/18 1831)  SpO2: 99 % (04/03/18 1824) Vital Signs (24h Range):  Temp:  [101.2 °F (38.4 °C)-101.6 °F (38.7 °C)] 101.6 °F (38.7 °C)  Pulse:  [] 96  Resp:  [18-25] 18  SpO2:  [98 %-99 %] 99 %  BP: (157-194)/() 157/78     Weight: 68.5 kg (151 lb 0.2 oz)  Body mass index is 24.37 kg/m².    Physical Exam   Constitutional: No distress.   Very lethargic AA male in no acute distress. Breathing well on RA despite intermittent dry coughing spells. Falls asleep frequently during interview.    HENT:   Head: Normocephalic and atraumatic.   Eyes: EOM are normal. Pupils are equal, round, and reactive to light.   Neck: Normal range of motion. Neck supple. No JVD present.   Cardiovascular: Normal rate, regular rhythm and normal heart sounds.    Pulmonary/Chest: Effort normal and breath sounds normal. He has no wheezes. He has no rales.   Poor respiratory effort but otherwise air movement is good. No appreciable crackles or wheezes.    Abdominal: Soft. Bowel sounds are normal. He exhibits distension (Mild periumbilical on deep palpation ).   Musculoskeletal: Normal range of motion.   Neurological: He is alert.   Skin: Skin is warm and dry. He is not diaphoretic.   Vitals reviewed.        CRANIAL NERVES     CN III, IV, VI   Pupils are equal, round, and reactive to light.  Extraocular motions are normal.        Significant Labs:   BMP:   Recent Labs  Lab 04/03/18  0727          K 2.9*   CL 97   CO2 30*   BUN 17   CREATININE 6.6*   CALCIUM 7.6*     CBC:   Recent Labs  Lab 04/03/18  1612   WBC 7.32   HGB 9.5*   HCT 27.6*        Lactic Acid:   Recent Labs  Lab 04/03/18  1612   LACTATE 1.8       Significant Imaging:   Imaging Results          X-Ray Chest 1 View (Final result)  Result time 04/03/18 19:10:14   Procedure changed from X-Ray Chest PA And Lateral     Final result by Wade Edward MD (04/03/18 19:10:14)                 Impression:      1. Stable interstitial findings, accentuated by shallow inspiratory effort versus minimal edema.  No large focal consolidation.      Electronically signed by: Wade Edward MD  Date:    04/03/2018  Time:    19:10             Narrative:    EXAMINATION:  XR CHEST 1 VIEW    CLINICAL HISTORY:  SOB; Shortness of breath    TECHNIQUE:  Single frontal view of the chest was performed.    COMPARISON:  03/07/2018    FINDINGS:  The cardiomediastinal silhouette is not enlarged.  There is no pleural effusion.  The trachea is midline.  The lungs are symmetrically expanded bilaterally with coarse interstitial attenuation, similar to the previous exam.  No large focal consolidation seen.  There is no pneumothorax.  The osseous structures are remarkable for degenerative changes.  There is a right vascular stent, stable..                               CT Head Without Contrast (Final result)  Result time 04/03/18 16:52:26    Final result by Wade Edward MD (04/03/18 16:52:26)                 Impression:      1. No acute intracranial abnormalities.  2. Stable sequela of chronic microvascular ischemic change and senescent change.  3. Multifocal regions of encephalomalacia, suggesting remote lacunar infarcts noting encephalomalacia in the region of previous left intraparenchymal/thalamic hemorrhage.  4. Minimal fluid within the left mastoid air cells inferiorly.      Electronically signed by: Wade Edward  MD  Date:    04/03/2018  Time:    16:52             Narrative:    EXAMINATION:  CT HEAD WITHOUT CONTRAST    CLINICAL HISTORY:  Confusion/delirium, altered LOC, unexplained;    TECHNIQUE:  Low dose axial images were obtained through the head.  Coronal and sagittal reformations were also performed. Contrast was not administered.    COMPARISON:  07/26/2017    FINDINGS:  There is generalized cerebral volume loss.  There is hypoattenuation in a periventricular fashion, likely sequela of chronic microvascular ischemic change.  There is a stable punctate focus of low attenuation within the anterior left corona radiata, as well as involving the right caudate, right thalamus, and left basal ganglia.  Foci of calcification are again noted within the right basal ganglia, and adjacent to the anterior horn of the left lateral ventricle.  Previous thalamic/intraparenchymal hemorrhage on the left has resolved noting some encephalomalacia in the region.  There is no evidence of acute major vascular territory infarct, acute hemorrhage, or mass.  There is no hydrocephalus.  There are no abnormal extra-axial fluid collections.  There is mild opacification of the inferior left mastoid air cells, otherwise the visualized paranasal sinuses and mastoid air cells are clear, and there is no evidence of calvarial fracture.  The visualized soft tissues are unremarkable.                                  Assessment/Plan:     * SIRS (systemic inflammatory response syndrome)    - Patient with fever of 101F in ED which eventually resolved after rectal Tylenol. HR of 112 resolved with IVF. Also with altered mentation - he is groggy with me, but otherwise AAOX3.   - Without clear source for infection.    - Patient with dry hacking cough in room, unclear time frame. Denies dysuria with little UOP he still produces.    - AVF non-erythematous or warm to touch and no other rashes present.    - CXR and LA X2 unremarkable and without leukocytosis.   -  Pro-calcitonin minimally elevated 0.4   - UA ordered but patient not producing urine and nothing even with straight cath.  - Given dose of ceftriaxone + vancomycin in ED with sepsis bolus.   - Considering mild abdominal pain, n/v - ordered CT Abdomen - without evidence of colitis or other infection. Constipation noted.  - Patient has remained afebrile since acetaminophen suppository with tachycardia resolved after fluids.  - Considering questionable social history, drug-induced hyperthermia in differential (ie cocaine, PCP, morphine w/d) although would expect agitation with fever rather than depressed state in this patient.  - Patient states he is feeling better - considering dry cough, patient possibly developed upper URI especially with body aches and general malaise; symptomatic management with tessalon pearls.   - F/u on blood cultures and urine cultures (if able to be attained).   - Holding antibiotics in interim but low threshold to restart.           Acute encephalopathy    - 1 day history, noted vomiting at dialysis center and suspicion of substance abuse by dialysis nurse.   - Depressed state, waxing and waning mentation.  - Differential - infectious encephalopathy w/fevers and tachycardia (see SIRS above) vs substance abuse (social history). CTH ruled out CVA (ie ICH in past). Without significant uremia (BUN 17) or other electrolyte disturbances that would cause AMS.   - Ordered narcan once in ED but without efficacy.  - UDS ordered but patient not producing urine and without any sample with cath.   - Neuro checks q4h.             ESRD on hemodialysis    - MWF dialysis - last dialysis Tuesday 4/3 but abridged session due to altered mentation and fever. Monday session day before was rescheduled as patient did not have transport.   - Nephrology consult in, please call in AM for dialysis          Reflux esophagitis    - s/p 4 admission in the past year. Last EGD 3/16 - grade C reflux esophagitis.  - Holding  home thorazine (for nausea) in setting of altered mentation.  - Continue pantoprazole 40 mg PO BID.           Renovascular hypertension    - Continue home amlodipine 10 mg PO daily, carvedilol 25 mg PO BID, and lisinopril 40 mg PO nightly.  - Nephrology consult in for HD          Hypokalemia    - 2.9 on admission.   - Replenish prn; more conservative replenishment in setting of ESRD and decreased excretion.           Chronic kidney disease-mineral and bone disorder    - Continue cinacalcet 60 mg daily.  - Corrected calcium 8.3; ionized calcium attained.   - Nephrology consulted.          History of Intracerebral Hemorrhage: L BG 5/2013; R BG 9/2016; R BG 11/2016; L caudate head 2/2017    - No evidence of ICH on CTH here.           History of left below knee amputation 12/18/13    - Niece states it was secondary to gangrene; she believes it was caused by the diabetes he used to have.          Dyslipidemia    - Continue atorvastatin 80 mg PO daily.         Secondary hyperparathyroidism of renal origin    - Continue cinacalcet 60 mg daily.        Anemia in chronic kidney disease    - Hemoglobin 9.5 on presentation here which is his baseline.   - Daily CBC.             VTE Risk Mitigation         Ordered     heparin (porcine) injection 5,000 Units  Every 8 hours     Route:  Subcutaneous        04/03/18 2026     IP VTE HIGH RISK PATIENT  Once      04/03/18 2026             Michael Cabral MD  PGY-2 Internal Medicine  [phone]    Department of Hospital Medicine   [facility]

## 2018-04-04 NOTE — PLAN OF CARE
Problem: Patient Care Overview  Goal: Plan of Care Review  Outcome: Ongoing (interventions implemented as appropriate)  Patient asleep most of the day, does not like being bothered by nursing staff. Lethargic, but easily aroused. Neuro status checked every 4 hours, no changes noted. Occasionally disoriented to situation. Nasal swabs obtained and sent to lab. Respiratory culture not obtained as patient is not coughing anything up. Tmax of 99.3 today. Vital signs stable, no distress noted. Will continue to monitor.

## 2018-04-04 NOTE — SUBJECTIVE & OBJECTIVE
Past Medical History:   Diagnosis Date    Amputation stump pain 9/10/2013    Aspiration pneumonia 7/27/2015    Asterixis 11/8/2016    C. difficile colitis 8/7/2015    Cholelithiasis without obstruction 8/25/2015    Chronic diastolic heart failure     2-23-17   1 - Low normal to mildly depressed left ventricular systolic function (EF 50-55%).    2 - Right ventricular enlargement with mildly depressed systolic function.    3 - Left ventricular diastolic dysfunction.    4 - Right atrial enlargement.    5 - Severe tricuspid regurgitation.    6 - Pulmonary hypertension. The estimated PA systolic pressure is 86 mmHg.    7 - Increased central venous pressure.    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    Metabolic acidosis, IAG, reduced excretion of inorganic acids     Myoclonic jerking 9/20/2016    Secondary hyperparathyroidism (of renal origin)     Secondary pulmonary hypertension 3/23/2017    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    TB lung, latent 08/25/2015    Negative Quantiferon Gold 3-23-17       Past Surgical History:   Procedure Laterality Date    COLONOSCOPY      COLONOSCOPY N/A 4/4/2017    Procedure: COLONOSCOPY;  Surgeon: Walker Stern MD;  Location: [facility] ([address] [place] [address]);  Service: Endoscopy;  Laterality: N/A;  PA Systolic Pressure 85.56. HD Patient MWF, K+ lab prior to procedure.     FOOT AMPUTATION THROUGH METATARSAL      left foot    LEG AMPUTATION THROUGH KNEE  12/18/2013    left BKA    R AVF   9/12/12       Review of patient's allergies indicates:   Allergen Reactions    Fosrenol [lanthanum] Nausea And Vomiting     Nausea and vomiting     Current Facility-Administered Medications   Medication Frequency    acetaminophen tablet 650 mg Q4H PRN    atorvastatin tablet 80 mg Daily    benzonatate capsule 100 mg TID PRN    cinacalcet tablet 60 mg Daily with breakfast    heparin (porcine) injection 5,000 Units Q8H    lisinopril tablet 40 mg QHS    ondansetron tablet 8 mg Q8H PRN    pantoprazole EC tablet 40 mg BID AC    polyethylene glycol packet 17 g Daily    promethazine tablet 25 mg Q6H PRN    senna-docusate 8.6-50 mg per tablet 1 tablet BID    sodium chloride 0.9% flush 3 mL PRN     Family History     Problem Relation (Age of Onset)    Alcohol abuse Maternal Grandmother    Diabetes Brother, Maternal Grandfather    Early death Mother    Heart disease Father    Hyperlipidemia Father    Hypertension Father, Sister    Kidney disease Father        Social History Main Topics    Smoking status: Former Smoker     Packs/day: 1.00     Years: 10.00    Smokeless tobacco: Never Used    Alcohol use No    Drug use: No    Sexual activity: Yes     Partners: Female     Birth control/ protection: None     Review of Systems   Constitutional: Positive for activity change, fatigue and fever. Negative for appetite change, chills and unexpected weight change.   HENT: Negative for congestion, facial swelling, postnasal drip, rhinorrhea, sinus pain and sinus pressure.    Respiratory: Negative for cough, chest tightness, shortness of breath and wheezing.    Cardiovascular: Negative for chest pain, palpitations and leg swelling.   Gastrointestinal: Positive for vomiting. Negative for abdominal distention, abdominal pain, blood in stool, diarrhea and nausea.   Genitourinary: Negative for difficulty urinating.   Musculoskeletal: Negative for arthralgias, joint swelling and myalgias.   Skin: Negative for color change, rash  and wound.   Neurological: Negative for dizziness, syncope, light-headedness and headaches.   Psychiatric/Behavioral: Positive for confusion. Negative for agitation and behavioral problems.     Objective:     Vital Signs (Most Recent):  Temp: 99.3 °F (37.4 °C) (04/04/18 0750)  Pulse: 77 (04/04/18 1100)  Resp: 18 (04/04/18 0750)  BP: (!) 155/86 (04/04/18 0750)  SpO2: 96 % (04/04/18 0750)  O2 Device (Oxygen Therapy): room air (04/04/18 0750) Vital Signs (24h Range):  Temp:  [98.7 °F (37.1 °C)-101.6 °F (38.7 °C)] 99.3 °F (37.4 °C)  Pulse:  [] 77  Resp:  [11-25] 18  SpO2:  [95 %-99 %] 96 %  BP: (137-194)/() 155/86     Weight: 69 kg (152 lb 1.9 oz) (04/03/18 2258)  Body mass index is 24.55 kg/m².  Body surface area is 1.79 meters squared.    I/O last 3 completed shifts:  In: 390 [P.O.:240; IV Piggyback:150]  Out: 2 [Stool:2]    Physical Exam   Constitutional: He is oriented to person, place, and time. He appears well-developed. No distress.   HENT:   Head: Normocephalic and atraumatic.   Right Ear: External ear normal.   Left Ear: External ear normal.   Eyes: Conjunctivae and EOM are normal. Right eye exhibits no discharge. Left eye exhibits no discharge.   Neck: Normal range of motion. Neck supple.   Cardiovascular: Normal rate and regular rhythm.  Exam reveals no gallop and no friction rub.    Murmur heard.  Pulmonary/Chest: Effort normal and breath sounds normal. No respiratory distress. He has no wheezes. He has no rales.   Abdominal: Soft. Bowel sounds are normal. He exhibits no distension. There is no tenderness.   Musculoskeletal: Normal range of motion. He exhibits no edema or tenderness.   Neurological: He is alert and oriented to person, place, and time.   Skin: Skin is warm and dry. He is not diaphoretic.   AVF to RICHARD       Significant Labs:  CBC:   Recent Labs  Lab 04/04/18  0353   WBC 5.44   RBC 2.64*   HGB 8.7*   HCT 26.1*   *   MCV 99*   MCH 33.0*   MCHC 33.3     CMP:   Recent Labs  Lab  04/04/18  0353      CALCIUM 7.6*   ALBUMIN 2.9*   PROT 7.4      K 3.6   CO2 24   CL 99   BUN 20   CREATININE 7.7*   ALKPHOS 177*   ALT 11   AST 28   BILITOT 0.3

## 2018-04-04 NOTE — ASSESSMENT & PLAN NOTE
ESRD on iHD MWF  [facility]  3.5 hours  RICHARD MERAZ  Unsure of EDW      Missed his HD treatment on Monday and rescheduled for Tuesday, but had abbreviated session due to AMS and vomiting, which prompted nurse to send patient to the ED for further evaluation.  Electrolytes and volume status acceptable this morning.  Primary team planning to observe today and possible discharge tomorrow.    Plan:  No indication for RRT today.  Plan for HD treatment tomorrow for metabolic clearance/volume management.  Will schedule for 1st shift in the event that patient is to be discharged.    Hypocalcemia in setting of secondary hyperparathyroidism  Discontinue cinacalcet. Increase calcium bath tomorrow with HD  Paricalcitol with HD treatment tomorrow

## 2018-04-04 NOTE — CONSULTS
"[facility]  Nephrology  Consult Note    Patient Name: Vaughn Retana  MRN: [id number]  Admission Date: 4/3/2018  Hospital Length of Stay: 1 days  Attending Provider: Deysi Alba MD   Primary Care Physician: Primary Doctor No  Principal Problem:SIRS (systemic inflammatory response syndrome)    Inpatient consult to Nephrology  Consult performed by: YO PORRAS  Consult ordered by: CHARLIE FOWLER IV  Reason for consult: ESRD        Subjective:     HPI: Mr. Retana is a 53 yo AAM with HTN, DM2, Esophagitis, with Hx of ICH, and L BKA, on iHD on MWF schedule who was sent from his dialysis unit on 04/03 for AMS, lethargy, and vomiting.  Chart review states that the nurse felt that patient "was under the influence" which prompted them to discontinue therapy and send patient to the ED.  He normal runs on a MWF schedule, but reported that he missed his dialysis treatment on Monday due to transportation issues and had to be rescheduled for Tuesday.  In the ED he was found to be lethargic, hyperthermic with temp of 103.1 and tachycardic with HR of 112.  He was given IVF (~2L) with resolution of fever and tachycardia.  LA normal.  This morning, he is AAO and eating breakfast at bedside with no complaints.  Electrolytes stable, oxygenating well on RA.    He normally dialyzes on MWF schedule at [facility] for 3.5 hours using RICHARD AVF.  He reports maintaining residual renal function.    Past Medical History:   Diagnosis Date    Amputation stump pain 9/10/2013    Aspiration pneumonia 7/27/2015    Asterixis 11/8/2016    C. difficile colitis 8/7/2015    Cholelithiasis without obstruction 8/25/2015    Chronic diastolic heart failure     2-23-17   1 - Low normal to mildly depressed left ventricular systolic function (EF 50-55%).    2 - Right ventricular enlargement with mildly depressed systolic function.    3 - Left ventricular diastolic dysfunction.    4 - Right atrial enlargement.    5 - Severe " tricuspid regurgitation.    6 - Pulmonary hypertension. The estimated PA systolic pressure is 86 mmHg.    7 - Increased central venous pressure.    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    Metabolic acidosis, IAG, reduced excretion of inorganic acids     Myoclonic jerking 9/20/2016    Secondary hyperparathyroidism (of renal origin)     Secondary pulmonary hypertension 3/23/2017    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 HD Patient MWF, K+ lab prior to procedure.     FOOT AMPUTATION THROUGH METATARSAL      left foot    LEG AMPUTATION THROUGH KNEE  12/18/2013    left BKA    R AVF  9/12/12       Review of patient's allergies indicates:   Allergen Reactions    Fosrenol [lanthanum] Nausea And Vomiting     Nausea and vomiting     Current Facility-Administered Medications   Medication Frequency    acetaminophen tablet 650 mg Q4H PRN    atorvastatin tablet 80 mg Daily    benzonatate capsule 100 mg TID PRN    cinacalcet tablet 60 mg Daily with breakfast    heparin (porcine) injection 5,000 Units Q8H    lisinopril tablet 40 mg QHS    ondansetron tablet 8 mg Q8H PRN    pantoprazole EC tablet 40  mg BID AC    polyethylene glycol packet 17 g Daily    promethazine tablet 25 mg Q6H PRN    senna-docusate 8.6-50 mg per tablet 1 tablet BID    sodium chloride 0.9% flush 3 mL PRN     Family History     Problem Relation (Age of Onset)    Alcohol abuse Maternal Grandmother    Diabetes Brother, Maternal Grandfather    Early death Mother    Heart disease Father    Hyperlipidemia Father    Hypertension Father, Sister    Kidney disease Father        Social History Main Topics    Smoking status: Former Smoker     Packs/day: 1.00     Years: 10.00    Smokeless tobacco: Never Used    Alcohol use No    Drug use: No    Sexual activity: Yes     Partners: Female     Birth control/ protection: None     Review of Systems   Constitutional: Positive for activity change, fatigue and fever. Negative for appetite change, chills and unexpected weight change.   HENT: Negative for congestion, facial swelling, postnasal drip, rhinorrhea, sinus pain and sinus pressure.    Respiratory: Negative for cough, chest tightness, shortness of breath and wheezing.    Cardiovascular: Negative for chest pain, palpitations and leg swelling.   Gastrointestinal: Positive for vomiting. Negative for abdominal distention, abdominal pain, blood in stool, diarrhea and nausea.   Genitourinary: Negative for difficulty urinating.   Musculoskeletal: Negative for arthralgias, joint swelling and myalgias.   Skin: Negative for color change, rash and wound.   Neurological: Negative for dizziness, syncope, light-headedness and headaches.   Psychiatric/Behavioral: Positive for confusion. Negative for agitation and behavioral problems.     Objective:     Vital Signs (Most Recent):  Temp: 99.3 °F (37.4 °C) (04/04/18 0750)  Pulse: 77 (04/04/18 1100)  Resp: 18 (04/04/18 0750)  BP: (!) 155/86 (04/04/18 0750)  SpO2: 96 % (04/04/18 0750)  O2 Device (Oxygen Therapy): room air (04/04/18 0750) Vital Signs (24h Range):  Temp:  [98.7 °F (37.1 °C)-101.6 °F (38.7 °C)] 99.3 °F  (37.4 °C)  Pulse:  [] 77  Resp:  [11-25] 18  SpO2:  [95 %-99 %] 96 %  BP: (137-194)/() 155/86     Weight: 69 kg (152 lb 1.9 oz) (04/03/18 2258)  Body mass index is 24.55 kg/m².  Body surface area is 1.79 meters squared.    I/O last 3 completed shifts:  In: 390 [P.O.:240; IV Piggyback:150]  Out: 2 [Stool:2]    Physical Exam   Constitutional: He is oriented to person, place, and time. He appears well-developed. No distress.   HENT:   Head: Normocephalic and atraumatic.   Right Ear: External ear normal.   Left Ear: External ear normal.   Eyes: Conjunctivae and EOM are normal. Right eye exhibits no discharge. Left eye exhibits no discharge.   Neck: Normal range of motion. Neck supple.   Cardiovascular: Normal rate and regular rhythm.  Exam reveals no gallop and no friction rub.    Murmur heard.  Pulmonary/Chest: Effort normal and breath sounds normal. No respiratory distress. He has no wheezes. He has no rales.   Abdominal: Soft. Bowel sounds are normal. He exhibits no distension. There is no tenderness.   Musculoskeletal: Normal range of motion. He exhibits no edema or tenderness.   Neurological: He is alert and oriented to person, place, and time.   Skin: Skin is warm and dry. He is not diaphoretic.   AVF to RICHARD       Significant Labs:  CBC:   Recent Labs  Lab 04/04/18  0353   WBC 5.44   RBC 2.64*   HGB 8.7*   HCT 26.1*   *   MCV 99*   MCH 33.0*   MCHC 33.3     CMP:   Recent Labs  Lab 04/04/18  0353      CALCIUM 7.6*   ALBUMIN 2.9*   PROT 7.4      K 3.6   CO2 24   CL 99   BUN 20   CREATININE 7.7*   ALKPHOS 177*   ALT 11   AST 28   BILITOT 0.3           Assessment/Plan:     ESRD on hemodialysis    ESRD on iHD [facility]  3.5 hours  RICHARD AVF  Unsure of EDW      Missed his HD treatment on Monday and rescheduled for Tuesday, but had abbreviated session due to AMS and vomiting, which prompted nurse to send patient to the ED for further evaluation.  Electrolytes and volume status  acceptable this morning.  Primary team planning to observe today and possible discharge tomorrow.    Plan:  No indication for RRT today.  Plan for HD treatment tomorrow for metabolic clearance/volume management.  Will schedule for 1st shift in the event that patient is to be discharged.    Hypocalcemia in setting of secondary hyperparathyroidism  Discontinue cinacalcet. Increase calcium bath tomorrow with HD  Paricalcitol with HD treatment tomorrow              Wade Weber NP  Nephrology  [facility]  Pager:  [phone]

## 2018-04-04 NOTE — ASSESSMENT & PLAN NOTE
- s/p 4 admission in the past year. Last EGD 3/16 - grade C reflux esophagitis.  - Holding home thorazine (for nausea) in setting of altered mentation.  - Continue pantoprazole 40 mg PO BID.

## 2018-04-04 NOTE — ASSESSMENT & PLAN NOTE
- Niece states it was secondary to gangrene; she believes it was caused by the diabetes he used to have.

## 2018-04-04 NOTE — ASSESSMENT & PLAN NOTE
- 1 day history, noted vomiting at dialysis center and suspicion of substance abuse by dialysis nurse.   - Depressed state, waxing and waning mentation.  - Differential - infectious encephalopathy w/fevers and tachycardia (see SIRS above) vs substance abuse (social history). CTH ruled out CVA (ie ICH in past). Without significant uremia (BUN 17) or other electrolyte disturbances that would cause AMS.   - Ordered narcan once in ED but without efficacy.  - UDS ordered but patient not producing urine and without any sample with cath.   - Neuro checks q4h.

## 2018-04-04 NOTE — PLAN OF CARE
Henny Rincon MD   [address] / [place] [address]       [id number] - [place], [place] - [address]  [address]  [place] [address]  Phone: [phone] Fax: [phone]    [facility] - [place], [place] - [address]  [address] [place] [address]  Phone: [phone] Fax: [phone]    [id number] - [place], [place] - [address]  [address]  [facility] [address]  Phone: [phone] Fax: [phone]      Payor: MEDICARE / Plan: MEDICARE PART A & B / Product Type: Government /         04/04/18 1213   Discharge Assessment   Assessment Type Discharge Planning Assessment   Confirmed/corrected address and phone number on facesheet? Yes   Assessment information obtained from? Patient   Expected Length of Stay (days) 3   Communicated expected length of stay with patient/caregiver yes   Prior to hospitilization cognitive status: Alert/Oriented   Prior to hospitalization functional status: Assistive Equipment   Current cognitive status: Alert/Oriented   Current Functional Status: Assistive Equipment   Lives With other relative(s)  (Lives with niece)   Able to Return to Prior Arrangements yes   Is patient able to care for self after discharge? Yes   Patient's perception of discharge disposition home or selfcare   Readmission Within The Last 30 Days current reason for admission unrelated to previous admission   If yes, most recent facility name: [facility]   Patient currently being followed by outpatient case management? No   Patient currently receives any other outside agency services? No   Equipment Currently Used at Home walker, rolling;wheelchair;prosthesis   Do you have any problems affording any of your prescribed medications? No   Is the patient taking medications as prescribed? yes   Does the patient have transportation home? Yes   Transportation Available  family or friend will provide   Dialysis Name and Scheduled days [facility]- M,W,F   Does the patient receive services at the [facility]? No   Discharge Plan A Home with family   Discharge Plan B Home Health   Patient/Family In Agreement With Plan yes   Readmission Questionnaire   At the time of your discharge, did someone talk to you about what your health problems were? Yes   At the time of discharge, did someone talk to you about what to watch out for regarding worsening of your health problem? Yes   At the time of discharge, did someone talk to you about what to do if you experienced worsening of your health problem? Yes   At the time of discharge, did someone talk to you about which medication to take when you left the hospital and which ones to stop taking? Yes   At the time of discharge, did someone talk to you about when and where to follow up with a doctor after you left the hospital? Yes   Do you have problems taking your medications as prescribed? No   Do you have any problems affording any of  your prescribed medications? No   Do you have problems obtaining/receiving your medications? No   Does the patient have transportation to healthcare appointments? Yes   Does the patient have family/friends to help with healtcare needs after discharge? yes

## 2018-04-04 NOTE — ASSESSMENT & PLAN NOTE
- 2.9 on admission.   - Replenish prn; more conservative replenishment in setting of ESRD and decreased excretion.

## 2018-04-04 NOTE — ASSESSMENT & PLAN NOTE
- MWF dialysis - last dialysis Tuesday 4/3 but abridged session due to altered mentation and fever. Monday session day before was rescheduled as patient did not have transport.   - Nephrology consult in, please call in AM for dialysis

## 2018-04-04 NOTE — PLAN OF CARE
Problem: Patient Care Overview  Goal: Plan of Care Review  Pt arrived to unit from ED. Lethargic but arouses to voice. Patient oriented x 3 on shift. Disoriented to situation. Pt currently incontinent of bowel (unsure if pts norm). Denies any pain when asked. Calcium replaced via PIV on shift. Pt passed bedside swallow study. Renal diet ordered. No other issues noted at this time. Will monitor.

## 2018-04-04 NOTE — ASSESSMENT & PLAN NOTE
- Continue cinacalcet 60 mg daily.  - Corrected calcium 8.3; ionized calcium attained.   - Nephrology consulted.

## 2018-04-04 NOTE — NURSING
Telemetry contacted twice to place patient on cardiac monitoring. Pt not pulling up on unit but telemetry monitors insist the patient is showing SR on their monitor. Well monitor.

## 2018-04-04 NOTE — HPI
"Vaughn Retana is a 52 year old male with a medical history significant for reflux esophagitis (repeated admissions for hematemesis and upper endoscopies), ESRD (MWF dialysis - 2/2 to HTN and type II DM), hx ICH (also multiple admissions, presumed from HTN), and left BKA (12/18/2013), who presents to the ED on 4/3 from dialysis center due to altered mental status. Per EMS, dialysis nurse was concerned patient was "under the influence." Dialysis session ended early as patient was not acting himself, febrile, and vomited on machine. History gathered largely from chart review and interview with patient's niece over the phone as the patient is mostly unresponsive, waxes and wanes, and not engaged during interview. According to patient's nieceDaisy, Mr. Retana had been doing well the last time he had seen him yesterday. She states that up until last year, he was living by himself and she took her uncle in on behest of his PCP who stated that patient was unable to take care of himself properly. The patient frequently leaves at odd hours and takes the bus to see his friends in [place], sometimes before anyone gets up. This was the case this morning; when she went to wake up him up at 6 am he was gone and had left to see his friends whom she says she does not know and find to be suspicious (as does patient's sister Alicia, whom I also talked to on the phone). Unclear if her uncle or friend have history of substance abuse.  Niece also mentions that uncle woke her up Monday morning at 6 am, banging on the door, after being up at unknown location overnight. At the time, she did not notice anything awry with his health or mentation. That day, he missed his dialysis due to missed transportation. Niece rescheduled for today (4/3), but uncle stated to him that he had a ride and later arrived to his dialysis center in the state described above. She notes that the  patient has terrible eating habits (unhealthy, greasy " "foods) as well as poor hand hygiene. Patient used to be a heavy drinker but stopped several years ago. From what I am able to gather from the patient himself, he states he endorses nausea, body aches, some mild abdominal pain. Denies diarrhea to me (although endorsed it to the ED physician). He states he still produces "a little urine." Denies dysuria, chest pain, shortness of breath.    On arrival to ED, his HR was 112, temperature was 101.2F, and /101. He had intermittent dry coughing spells but was sat'ing well on room air. CTH unremarkable for acute intracranial abnormalities such as hemorrhages as seen in past. CXR and LA were both unremarkable. Given rectal acetaminophen and dose of IV ceftriaxone and vancomycin as well as 30 cc/kg fluid bolus (~2L).   "

## 2018-04-04 NOTE — HPI
"Mr. Retana is a 53 yo AAM with HTN, DM2, Esophagitis, with Hx of ICH, and L BKA, on iHD on MWF schedule who was sent from his dialysis unit on 04/03 for AMS, lethargy, and vomiting.  Chart review states that the nurse felt that patient "was under the influence" which prompted them to discontinue therapy and send patient to the ED.  He normal runs on a MWF schedule, but reported that he missed his dialysis treatment on Monday due to transportation issues and had to be rescheduled for Tuesday.  In the ED he was found to be lethargic, hyperthermic with temp of 103.1 and tachycardic with HR of 112.  He was given IVF (~2L) with resolution of fever and tachycardia.  LA normal.  This morning, he is AAO and eating breakfast at bedside with no complaints.  Electrolytes stable, oxygenating well on RA.    He normally dialyzes on MWF schedule at [facility] for 3.5 hours using RICHARD AVF.  He reports maintaining residual renal function.  "

## 2018-04-04 NOTE — PHARMACY MED REC
Admission Medication Reconciliation - Pharmacy Consult Note    The home medication history was taken by Elvia Estrada, Pharmacy Tech.       Potential issues to be addressed PRIOR TO DISCHARGE  o Patient does not endorse regularly taking the following: amlodipine 10 mg daily, atorvastatin 80 mg daily, carvedilol 25 mg BID, chlorpromazine 25 mg TID, cinacalcet 60 mg daily, hydralazine 50 mg TID; consider writing new Rxs if appropriate  o Secondary Hyperparathyroidism: Cinacalcet d/c by nephrology in favor of synthetic vitamin D analogue w/ HD 2/2 hypocalcemia--need to discuss calcitriol vs paricalcitol w/ HD at discharge or at PCP follow up  o Patient on Reglan 10 mg PO TID at home; max dose w/ HD is 5 mg BID; would avoid long term use (currently holding)    Please address this information as you see fit.  Feel free to contact us if you have any questions or require assistance.    Leonard Reeves, PharmD  [id number]      Patient's prior to admission medication regimen was as follows:  Current Outpatient Prescriptions on File Prior to Encounter   Medication Sig Dispense Refill Last Dose    lisinopril (PRINIVIL,ZESTRIL) 40 MG tablet Take 1 tablet (40 mg total) by mouth every evening. 90 tablet 4 Taking    metoclopramide HCl (REGLAN) 10 MG tablet Take 1 tablet (10 mg total) by mouth 3 (three) times daily before meals. 90 tablet 0 Taking    ondansetron (ZOFRAN) 8 MG tablet Take 1 tablet (8 mg total) by mouth every 8 (eight) hours as needed for Nausea. 15 tablet 0 Taking    pantoprazole (PROTONIX) 40 MG tablet Take 1 tablet (40 mg total) by mouth 2 (two) times daily before meals. 60 tablet 11 Taking    polyethylene glycol (GLYCOLAX) 17 gram PwPk Take 17 g by mouth 2 (two) times daily. (Patient taking differently: Take 17 g by mouth daily as needed (constipation). ) 60 each 11 Not Taking    [DISCONTINUED] amLODIPine (NORVASC) 10 MG tablet Take 1 tablet (10 mg total) by mouth every morning. 90 tablet 4 Taking    [DISCONTINUED]  atorvastatin (LIPITOR) 80 MG tablet Take 1 tablet (80 mg total) by mouth once daily. 30 tablet 2 Taking    [DISCONTINUED] carvedilol (COREG) 25 MG tablet Take 1 tablet (25 mg total) by mouth 2 (two) times daily with meals. 180 tablet 4 Taking    [DISCONTINUED] chlorproMAZINE (THORAZINE) 25 MG tablet Take 1 tablet (25 mg total) by mouth 3 (three) times daily. 90 tablet 11 Taking    [DISCONTINUED] cinacalcet (SENSIPAR) 60 MG Tab Take 1 tablet (60 mg total) by mouth daily with breakfast. 30 tablet 3 Taking    [DISCONTINUED] hydrALAZINE (APRESOLINE) 50 MG tablet Take 1 tablet (50 mg total) by mouth every 8 (eight) hours as needed (systolic blood pressure (top number) > 180). 90 tablet 4 Taking    [DISCONTINUED] senna-docusate 8.6-50 mg (PERICOLACE) 8.6-50 mg per tablet Take 2 tablets by mouth once daily.   Taking         .    .

## 2018-04-04 NOTE — ASSESSMENT & PLAN NOTE
- Continue home amlodipine 10 mg PO daily, carvedilol 25 mg PO BID, and lisinopril 40 mg PO nightly.  - Nephrology consult in for HD

## 2018-04-04 NOTE — ASSESSMENT & PLAN NOTE
- Patient with fever of 101F in ED which eventually resolved after rectal Tylenol.  - Without clear source for infection.    - Patient with dry hacking cough in room, unclear time frame. Denies dysuria with little UOP he still produces.    - AVF non-erythematous or warm to touch and no other rashes present.    - CXR and LA X2 unremarkable and without leukocytosis.   - Pro-calcitonin minimally elevated 0.4   - UA ordered but patient not producing urine and nothing even with straight cath.  - Given dose of ceftriaxone + vancomycin in ED with sepsis bolus.   - Considering mild abdominal pain, n/v - ordered CT Abdomen - without evidence of colitis or other infection. Constipation noted.  - Patient has remained afebrile since acetaminophen suppository with tachycardia resolved after fluids.  - Considering questionable social history, drug-induced hyperthermia in differential (ie cocaine, PCP, morphine w/d) although would expect agitation with fever rather than depressed state in this patient.  - Infectious w/u is negative and pt much improved and oriented this morning. This is likely viral syndrome.   - Can d/c home today with close f/u with PCP

## 2018-04-04 NOTE — SUBJECTIVE & OBJECTIVE
Past Medical History:   Diagnosis Date    Amputation stump pain 9/10/2013    Aspiration pneumonia 7/27/2015    Asterixis 11/8/2016    C. difficile colitis 8/7/2015    Cholelithiasis without obstruction 8/25/2015    Chronic diastolic heart failure     2-23-17   1 - Low normal to mildly depressed left ventricular systolic function (EF 50-55%).    2 - Right ventricular enlargement with mildly depressed systolic function.    3 - Left ventricular diastolic dysfunction.    4 - Right atrial enlargement.    5 - Severe tricuspid regurgitation.    6 - Pulmonary hypertension. The estimated PA systolic pressure is 86 mmHg.    7 - Increased central venous pressure.    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    Metabolic acidosis, IAG, reduced excretion of inorganic acids     Myoclonic jerking 9/20/2016    Secondary hyperparathyroidism (of renal origin)     Secondary pulmonary hypertension 3/23/2017    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    TB lung, latent 08/25/2015    Negative Quantiferon Gold 3-23-17       Past Surgical History:   Procedure Laterality Date    COLONOSCOPY      COLONOSCOPY N/A 4/4/2017    Procedure: COLONOSCOPY;  Surgeon: Walker Stern MD;  Location: [facility] ([address] [place] [address]);  Service: Endoscopy;  Laterality: N/A;  PA Systolic Pressure 85.56. HD Patient MWF, K+ lab prior to procedure.     FOOT AMPUTATION THROUGH METATARSAL      left foot    LEG AMPUTATION THROUGH KNEE  12/18/2013    left BKA    R AVF   9/12/12       Review of patient's allergies indicates:   Allergen Reactions    Fosrenol [lanthanum] Nausea And Vomiting     Nausea and vomiting       No current facility-administered medications on file prior to encounter.      Current Outpatient Prescriptions on File Prior to Encounter   Medication Sig    amLODIPine (NORVASC) 10 MG tablet Take 1 tablet (10 mg total) by mouth every morning.    atorvastatin (LIPITOR) 80 MG tablet Take 1 tablet (80 mg total) by mouth once daily.    carvedilol (COREG) 25 MG tablet Take 1 tablet (25 mg total) by mouth 2 (two) times daily with meals.    chlorproMAZINE (THORAZINE) 25 MG tablet Take 1 tablet (25 mg total) by mouth 3 (three) times daily.    cinacalcet (SENSIPAR) 60 MG Tab Take 1 tablet (60 mg total) by mouth daily with breakfast.    hydrALAZINE (APRESOLINE) 50 MG tablet Take 1 tablet (50 mg total) by mouth every 8 (eight) hours as needed (systolic blood pressure (top number) > 180).    lisinopril (PRINIVIL,ZESTRIL) 40 MG tablet Take 1 tablet (40 mg total) by mouth every evening.    metoclopramide HCl (REGLAN) 10 MG tablet Take 1 tablet (10 mg total) by mouth 3 (three) times daily before meals.    ondansetron (ZOFRAN) 8 MG tablet Take 1 tablet (8 mg total) by mouth every 8 (eight) hours as needed for Nausea.    pantoprazole (PROTONIX) 40 MG tablet Take 1 tablet (40 mg total) by mouth 2 (two) times daily before meals.    polyethylene glycol (GLYCOLAX) 17 gram PwPk Take 17 g by mouth 2 (two) times daily.    senna-docusate 8.6-50 mg (PERICOLACE) 8.6-50 mg per tablet Take 2 tablets by mouth once daily.     Family History     Problem Relation (Age of Onset)    Alcohol abuse Maternal Grandmother    Diabetes Brother, Maternal Grandfather    Early death Mother    Heart disease Father    Hyperlipidemia Father    Hypertension Father, Sister    Kidney disease Father        Social History Main Topics    Smoking status: Former Smoker     Packs/day: 1.00     Years: 10.00     Smokeless tobacco: Never Used    Alcohol use No    Drug use: No    Sexual activity: Yes     Partners: Female     Birth control/ protection: None     Review of Systems   Unable to perform ROS: Patient unresponsive   Constitutional: Positive for activity change, fatigue and fever.   HENT: Negative for congestion.    Respiratory: Positive for cough.    Cardiovascular: Negative for chest pain.   Gastrointestinal: Positive for abdominal pain, diarrhea, nausea and vomiting.   Genitourinary: Positive for decreased urine volume.   Musculoskeletal: Positive for arthralgias and back pain.   Skin: Negative for color change and rash.   Neurological: Positive for weakness.   Psychiatric/Behavioral: Positive for confusion.     Objective:     Vital Signs (Most Recent):  Temp: (!) 101.6 °F (38.7 °C) (04/03/18 1848)  Pulse: 96 (04/03/18 1831)  Resp: 18 (04/03/18 1831)  BP: (!) 157/78 (04/03/18 1831)  SpO2: 99 % (04/03/18 1824) Vital Signs (24h Range):  Temp:  [101.2 °F (38.4 °C)-101.6 °F (38.7 °C)] 101.6 °F (38.7 °C)  Pulse:  [] 96  Resp:  [18-25] 18  SpO2:  [98 %-99 %] 99 %  BP: (157-194)/() 157/78     Weight: 68.5 kg (151 lb 0.2 oz)  Body mass index is 24.37 kg/m².    Physical Exam   Constitutional: No distress.   Very lethargic AA male in no acute distress. Breathing well on RA despite intermittent dry coughing spells. Falls asleep frequently during interview.    HENT:   Head: Normocephalic and atraumatic.   Eyes: EOM are normal. Pupils are equal, round, and reactive to light.   Neck: Normal range of motion. Neck supple. No JVD present.   Cardiovascular: Normal rate, regular rhythm and normal heart sounds.    Pulmonary/Chest: Effort normal and breath sounds normal. He has no wheezes. He has no rales.   Poor respiratory effort but otherwise air movement is good. No appreciable crackles or wheezes.    Abdominal: Soft. Bowel sounds are normal. He exhibits distension (Mild periumbilical on deep palpation ).    Musculoskeletal: Normal range of motion.   Neurological: He is alert.   Skin: Skin is warm and dry. He is not diaphoretic.   Vitals reviewed.        CRANIAL NERVES     CN III, IV, VI   Pupils are equal, round, and reactive to light.  Extraocular motions are normal.        Significant Labs:   BMP:   Recent Labs  Lab 04/03/18  1717         K 2.9*   CL 97   CO2 30*   BUN 17   CREATININE 6.6*   CALCIUM 7.6*     CBC:   Recent Labs  Lab 04/03/18  1612   WBC 7.32   HGB 9.5*   HCT 27.6*        Lactic Acid:   Recent Labs  Lab 04/03/18  1612   LACTATE 1.8       Significant Imaging:   Imaging Results          X-Ray Chest 1 View (Final result)  Result time 04/03/18 19:10:14   Procedure changed from X-Ray Chest PA And Lateral     Final result by Wade Edward MD (04/03/18 19:10:14)                 Impression:      1. Stable interstitial findings, accentuated by shallow inspiratory effort versus minimal edema.  No large focal consolidation.      Electronically signed by: Wade Edward MD  Date:    04/03/2018  Time:    19:10             Narrative:    EXAMINATION:  XR CHEST 1 VIEW    CLINICAL HISTORY:  SOB; Shortness of breath    TECHNIQUE:  Single frontal view of the chest was performed.    COMPARISON:  03/07/2018    FINDINGS:  The cardiomediastinal silhouette is not enlarged.  There is no pleural effusion.  The trachea is midline.  The lungs are symmetrically expanded bilaterally with coarse interstitial attenuation, similar to the previous exam.  No large focal consolidation seen.  There is no pneumothorax.  The osseous structures are remarkable for degenerative changes.  There is a right vascular stent, stable..                               CT Head Without Contrast (Final result)  Result time 04/03/18 16:52:26    Final result by Wade Edward MD (04/03/18 16:52:26)                 Impression:      1. No acute intracranial abnormalities.  2. Stable sequela of chronic microvascular ischemic  change and senescent change.  3. Multifocal regions of encephalomalacia, suggesting remote lacunar infarcts noting encephalomalacia in the region of previous left intraparenchymal/thalamic hemorrhage.  4. Minimal fluid within the left mastoid air cells inferiorly.      Electronically signed by: Wade Edward MD  Date:    04/03/2018  Time:    16:52             Narrative:    EXAMINATION:  CT HEAD WITHOUT CONTRAST    CLINICAL HISTORY:  Confusion/delirium, altered LOC, unexplained;    TECHNIQUE:  Low dose axial images were obtained through the head.  Coronal and sagittal reformations were also performed. Contrast was not administered.    COMPARISON:  07/26/2017    FINDINGS:  There is generalized cerebral volume loss.  There is hypoattenuation in a periventricular fashion, likely sequela of chronic microvascular ischemic change.  There is a stable punctate focus of low attenuation within the anterior left corona radiata, as well as involving the right caudate, right thalamus, and left basal ganglia.  Foci of calcification are again noted within the right basal ganglia, and adjacent to the anterior horn of the left lateral ventricle.  Previous thalamic/intraparenchymal hemorrhage on the left has resolved noting some encephalomalacia in the region.  There is no evidence of acute major vascular territory infarct, acute hemorrhage, or mass.  There is no hydrocephalus.  There are no abnormal extra-axial fluid collections.  There is mild opacification of the inferior left mastoid air cells, otherwise the visualized paranasal sinuses and mastoid air cells are clear, and there is no evidence of calvarial fracture.  The visualized soft tissues are unremarkable.

## 2018-04-05 VITALS
HEIGHT: 66 IN | TEMPERATURE: 98 F | WEIGHT: 152.13 LBS | HEART RATE: 82 BPM | OXYGEN SATURATION: 94 % | SYSTOLIC BLOOD PRESSURE: 155 MMHG | RESPIRATION RATE: 16 BRPM | DIASTOLIC BLOOD PRESSURE: 82 MMHG | BODY MASS INDEX: 24.45 KG/M2

## 2018-04-05 LAB
ALBUMIN SERPL BCP-MCNC: 2.6 G/DL
ALP SERPL-CCNC: 147 U/L
ALT SERPL W/O P-5'-P-CCNC: 9 U/L
ANION GAP SERPL CALC-SCNC: 13 MMOL/L
AST SERPL-CCNC: 25 U/L
BASOPHILS # BLD AUTO: 0.02 K/UL
BASOPHILS NFR BLD: 0.5 %
BILIRUB SERPL-MCNC: 0.3 MG/DL
BUN SERPL-MCNC: 30 MG/DL
CALCIUM SERPL-MCNC: 6.4 MG/DL
CHLORIDE SERPL-SCNC: 98 MMOL/L
CO2 SERPL-SCNC: 27 MMOL/L
CREAT SERPL-MCNC: 10.4 MG/DL
DIFFERENTIAL METHOD: ABNORMAL
ENTEROVIRUS: NOT DETECTED
EOSINOPHIL # BLD AUTO: 0.6 K/UL
EOSINOPHIL NFR BLD: 13.6 %
ERYTHROCYTE [DISTWIDTH] IN BLOOD BY AUTOMATED COUNT: 14.6 %
EST. GFR  (AFRICAN AMERICAN): 5.8 ML/MIN/1.73 M^2
EST. GFR  (NON AFRICAN AMERICAN): 5 ML/MIN/1.73 M^2
GLUCOSE SERPL-MCNC: 119 MG/DL
HCT VFR BLD AUTO: 23.8 %
HGB BLD-MCNC: 8 G/DL
HIV 1+2 AB+HIV1 P24 AG SERPL QL IA: NEGATIVE
HUMAN BOCAVIRUS: NOT DETECTED
HUMAN CORONAVIRUS, COMMON COLD VIRUS: NOT DETECTED
IMM GRANULOCYTES # BLD AUTO: 0.01 K/UL
IMM GRANULOCYTES NFR BLD AUTO: 0.2 %
INFLUENZA A - H1N1-09: NOT DETECTED
LYMPHOCYTES # BLD AUTO: 1.1 K/UL
LYMPHOCYTES NFR BLD: 25.4 %
MAGNESIUM SERPL-MCNC: 1.9 MG/DL
MCH RBC QN AUTO: 32.7 PG
MCHC RBC AUTO-ENTMCNC: 33.6 G/DL
MCV RBC AUTO: 97 FL
MONOCYTES # BLD AUTO: 0.6 K/UL
MONOCYTES NFR BLD: 14.1 %
NEUTROPHILS # BLD AUTO: 2 K/UL
NEUTROPHILS NFR BLD: 46.2 %
NRBC BLD-RTO: 0 /100 WBC
PARAINFLUENZA: NOT DETECTED
PHOSPHATE SERPL-MCNC: 4.2 MG/DL
PLATELET # BLD AUTO: 160 K/UL
PMV BLD AUTO: 10.7 FL
POTASSIUM SERPL-SCNC: 3.3 MMOL/L
PROT SERPL-MCNC: 6.5 G/DL
RBC # BLD AUTO: 2.45 M/UL
RVP - ADENOVIRUS: NOT DETECTED
RVP - HUMAN METAPNEUMOVIRUS (HMPV): NOT DETECTED
RVP - INFLUENZA A: NOT DETECTED
RVP - INFLUENZA B: NOT DETECTED
RVP - RESPIRATORY SYNCTIAL VIRUS (RSV) A: NOT DETECTED
RVP - RESPIRATORY VIRAL PANEL, SOURCE: NORMAL
RVP - RHINOVIRUS: NOT DETECTED
SODIUM SERPL-SCNC: 138 MMOL/L
WBC # BLD AUTO: 4.41 K/UL

## 2018-04-05 PROCEDURE — 80053 COMPREHEN METABOLIC PANEL: CPT

## 2018-04-05 PROCEDURE — 83735 ASSAY OF MAGNESIUM: CPT

## 2018-04-05 PROCEDURE — 25000003 PHARM REV CODE 250: Performed by: NURSE PRACTITIONER

## 2018-04-05 PROCEDURE — 63600175 PHARM REV CODE 636 W HCPCS: Performed by: NURSE PRACTITIONER

## 2018-04-05 PROCEDURE — 99238 HOSP IP/OBS DSCHRG MGMT 30/<: CPT | Mod: ,,, | Performed by: HOSPITALIST

## 2018-04-05 PROCEDURE — 36415 COLL VENOUS BLD VENIPUNCTURE: CPT

## 2018-04-05 PROCEDURE — 63600175 PHARM REV CODE 636 W HCPCS: Performed by: STUDENT IN AN ORGANIZED HEALTH CARE EDUCATION/TRAINING PROGRAM

## 2018-04-05 PROCEDURE — 25000003 PHARM REV CODE 250: Performed by: STUDENT IN AN ORGANIZED HEALTH CARE EDUCATION/TRAINING PROGRAM

## 2018-04-05 PROCEDURE — 85025 COMPLETE CBC W/AUTO DIFF WBC: CPT

## 2018-04-05 PROCEDURE — 90935 HEMODIALYSIS ONE EVALUATION: CPT | Mod: ,,, | Performed by: INTERNAL MEDICINE

## 2018-04-05 PROCEDURE — 84100 ASSAY OF PHOSPHORUS: CPT

## 2018-04-05 PROCEDURE — 90935 HEMODIALYSIS ONE EVALUATION: CPT

## 2018-04-05 RX ORDER — PARICALCITOL 5 UG/ML
2 INJECTION, SOLUTION INTRAVENOUS
Status: COMPLETED | OUTPATIENT
Start: 2018-04-05 | End: 2018-04-05

## 2018-04-05 RX ORDER — SODIUM CHLORIDE 9 MG/ML
INJECTION, SOLUTION INTRAVENOUS ONCE
Status: COMPLETED | OUTPATIENT
Start: 2018-04-05 | End: 2018-04-05

## 2018-04-05 RX ADMIN — PARICALCITOL 2 MCG: 5 INJECTION INTRAVENOUS at 11:04

## 2018-04-05 RX ADMIN — ATORVASTATIN CALCIUM 80 MG: 20 TABLET, FILM COATED ORAL at 12:04

## 2018-04-05 RX ADMIN — HEPARIN SODIUM 5000 UNITS: 5000 INJECTION, SOLUTION INTRAVENOUS; SUBCUTANEOUS at 06:04

## 2018-04-05 RX ADMIN — PANTOPRAZOLE SODIUM 40 MG: 40 TABLET, DELAYED RELEASE ORAL at 06:04

## 2018-04-05 RX ADMIN — SODIUM CHLORIDE: 0.9 INJECTION, SOLUTION INTRAVENOUS at 08:04

## 2018-04-05 NOTE — DISCHARGE SUMMARY
"[facility] Hospital Medicine  Discharge Summary      Patient Name: Vaughn Retana  MRN: [id number]  Admission Date: 4/3/2018  Hospital Length of Stay: 2 days  Discharge Date and Time: 4/5/2018  2:25 PM  Attending Physician: Deysi Alba MD   Discharging Provider: Cesar Valdez MD  Primary Care Provider: Henny Rincon MD  [facility] Medicine Team: [facility] HOSP MED 2 Cesar Valdez MD    HPI:   Vaughn Retana is a 52 year old male with a medical history significant for reflux esophagitis (repeated admissions for hematemesis and upper endoscopies), ESRD (MWF dialysis - 2/2 to HTN and type II DM), hx ICH (also multiple admissions, presumed from HTN), and left BKA (12/18/2013), who presents to the ED on 4/3 from dialysis center due to altered mental status. Per EMS, dialysis nurse was concerned patient was "under the influence." Dialysis session ended early as patient was not acting himself, febrile, and vomited on machine. History gathered largely from chart review and interview with patient's niece over the phone as the patient is mostly unresponsive, waxes and wanes, and not engaged during interview. According to patient's nieceDaisy, Mr. Retana had been doing well the last time he had seen him yesterday. She states that up until last year, he was living by himself and she took her uncle in on behest of his PCP who stated that patient was unable to take care of himself properly. The patient frequently leaves at odd hours and takes the bus to see his friends in [place], sometimes before anyone gets up. This was the case this morning; when she went to wake up him up at 6 am he was gone and had left to see his friends whom she says she does not know and find to be suspicious (as does patient's sister Alicia, whom I also talked to on the phone). Unclear if her uncle or friend have history of substance abuse.  Niece also mentions that uncle woke her up Monday morning at 6 am, banging on " "the door, after being up at unknown location overnight. At the time, she did not notice anything awry with his health or mentation. That day, he missed his dialysis due to missed transportation. Niece rescheduled for today (4/3), but uncle stated to him that he had a ride and later arrived to his dialysis center in the state described above. She notes that the  patient has terrible eating habits (unhealthy, greasy foods) as well as poor hand hygiene. Patient used to be a heavy drinker but stopped several years ago. From what I am able to gather from the patient himself, he states he endorses nausea, body aches, some mild abdominal pain. Denies diarrhea to me (although endorsed it to the ED physician). He states he still produces "a little urine." Denies dysuria, chest pain, shortness of breath.    On arrival to ED, his HR was 112, temperature was 101.2F, and /101. He had intermittent dry coughing spells but was sat'ing well on room air. CTH unremarkable for acute intracranial abnormalities such as hemorrhages as seen in past. CXR and LA were both unremarkable. Given rectal acetaminophen and dose of IV ceftriaxone and vancomycin as well as 30 cc/kg fluid bolus (~2L).     * No surgery found *      Hospital Course:   admitted to [facility] overnight for evaluation of acute encephalopathy and fever, noted while at HD.  Daughter provides additional history of progressive decline (or behavioral change) over past year.  Infectious w/u is negative and pt much improved and oriented the following morning.  Held antibiotics as this is likely viral syndrome and monitored overnight. Patient continued to improve. Received HD and was stable for discharge with close follow up with PCP.     Consults:   Consults         Status Ordering Provider     Inpatient consult to Nephrology  Once     Provider:  (Not yet assigned)    Completed CHARLIE FOWLER IV          SIRS (systemic inflammatory response syndrome)     - Patient with fever of " 101F in ED which eventually resolved after rectal Tylenol.  - Without clear source for infection.               - Patient with dry hacking cough in room, unclear time frame. Denies dysuria with little UOP he still produces.               - AVF non-erythematous or warm to touch and no other rashes present.               - CXR and LA X2 unremarkable and without leukocytosis.              - Pro-calcitonin minimally elevated 0.4              - UA ordered but patient not producing urine and nothing even with straight cath.  - Given dose of ceftriaxone + vancomycin in ED with sepsis bolus.   - Considering mild abdominal pain, n/v - ordered CT Abdomen - without evidence of colitis or other infection. Constipation noted.  - Patient has remained afebrile since acetaminophen suppository with tachycardia resolved after fluids.  - Considering questionable social history, drug-induced hyperthermia in differential (ie cocaine, PCP, morphine w/d) although would expect agitation with fever rather than depressed state in this patient.  - Infectious w/u is negative and pt much improved and oriented this morning. This is likely viral syndrome.   - Can d/c home today with close f/u with PCP                Renovascular hypertension     - Continue lisinopril 40 mg PO nightly.  - Nephrology consult for HD             Hypokalemia     - 2.9 on admission.   - Replenish prn; more conservative replenishment in setting of ESRD and decreased excretion.              Chronic kidney disease-mineral and bone disorder     - Corrected calcium 8.3; ionized calcium attained.   - Nephrology consulted.             Reflux esophagitis     - s/p 4 admission in the past year. Last EGD 3/16 - grade C reflux esophagitis.  - Holding home thorazine (for nausea) in setting of altered mentation.  - Continue pantoprazole 40 mg PO BID.              History of Intracerebral Hemorrhage: L BG 5/2013; R BG 9/2016; R BG 11/2016; L caudate head 2/2017     - No evidence of  ICH on CTH here.              Hypocalcemia     - Ionized 0.75  - 1g calcium gluconate IV given overnight.  - Likely from renal disease.          Acute encephalopathy     -Resolved                History of left below knee amputation 12/18/13     - Niece states it was secondary to gangrene; she believes it was caused by the diabetes he used to have.             ESRD on hemodialysis     - MWF dialysis   - HD per Nephrology              Dyslipidemia     - Continue atorvastatin 80 mg PO daily.           Secondary hyperparathyroidism of renal origin     - Continue cinacalcet 60 mg daily.          Anemia in chronic kidney disease     - Hemoglobin 9.5 on presentation here which is his baseline.   - Daily CBC       Final Active Diagnoses:    Diagnosis Date Noted POA    PRINCIPAL PROBLEM:  SIRS (systemic inflammatory response syndrome) [R65.10] 08/06/2015 Yes    Renovascular hypertension [I15.0] 03/16/2018 Yes     Chronic    Hypokalemia [E87.6] 03/08/2018 Yes     Chronic    Chronic kidney disease-mineral and bone disorder [N18.9, E83.9, M89.9] 07/28/2017 Yes     Chronic    Reflux esophagitis [K21.0] 06/24/2017 Yes    History of Intracerebral Hemorrhage: L BG 5/2013; R BG 9/2016; R BG 11/2016; L caudate head 2/2017 [Z86.79] 11/02/2016 Not Applicable     Chronic    Hypocalcemia [E83.51] 09/20/2016 Yes    Acute encephalopathy [G93.40] 07/27/2015 Yes    History of left below knee amputation 12/18/13 [Z89.512] 12/19/2013 Not Applicable     Chronic    ESRD on hemodialysis [N18.6, Z99.2] 02/07/2013 Not Applicable     Chronic    Dyslipidemia [E78.5] 02/07/2013 Yes     Chronic    Secondary hyperparathyroidism of renal origin [N25.81] 09/17/2012 Yes     Chronic    Anemia in chronic kidney disease [N18.9, D63.1] 09/17/2012 Yes     Chronic      Problems Resolved During this Admission:    Diagnosis Date Noted Date Resolved POA       Discharged Condition: good    Disposition: Home or Self Care    Follow Up:  Follow-up  Information     Henny Rincon MD. Schedule an appointment as soon as possible for a visit in 1 week.    Specialty:  Internal Medicine  Contact information:  [address]  [facility] [id number]  [phone]                 Patient Instructions:     Diet diabetic     Activity as tolerated     Notify your health care provider if you experience any of the following:  temperature >100.4     Notify your health care provider if you experience any of the following:  persistent nausea and vomiting or diarrhea     Notify your health care provider if you experience any of the following:  severe uncontrolled pain     Notify your health care provider if you experience any of the following:  redness, tenderness, or signs of infection (pain, swelling, redness, odor or green/yellow discharge around incision site)     Notify your health care provider if you experience any of the following:  difficulty breathing or increased cough     Notify your health care provider if you experience any of the following:  persistent dizziness, light-headedness, or visual disturbances     Notify your health care provider if you experience any of the following:  increased confusion or weakness             Pending Diagnostic Studies:     None         Medications:  Reconciled Home Medications:      Medication List      CHANGE how you take these medications    polyethylene glycol 17 gram Pwpk  Commonly known as:  GLYCOLAX  Take 17 g by mouth 2 (two) times daily.  What changed:  · when to take this  · reasons to take this        CONTINUE taking these medications    lisinopril 40 MG tablet  Commonly known as:  PRINIVIL,ZESTRIL  Take 1 tablet (40 mg total) by mouth every evening.     metoclopramide HCl 10 MG tablet  Commonly known as:  REGLAN  Take 1 tablet (10 mg total) by mouth 3 (three) times daily before meals.     ondansetron 8 MG tablet  Commonly known as:  ZOFRAN  Take 1 tablet (8 mg total) by mouth every 8 (eight) hours as needed for  Nausea.     pantoprazole 40 MG tablet  Commonly known as:  PROTONIX  Take 1 tablet (40 mg total) by mouth 2 (two) times daily before meals.            Indwelling Lines/Drains at time of discharge:   Lines/Drains/Airways     Drain                 Hemodialysis AV Fistula Right upper arm -- days         Hemodialysis AV Fistula 03/08/18 1522 Right upper arm 27 days                Time spent on the discharge of patient: 35 minutes  Patient was seen and examined on the date of discharge and determined to be suitable for discharge.         Cesar Valdez MD  Department of Hospital Medicine  [facility]

## 2018-04-05 NOTE — PLAN OF CARE
Problem: Patient Care Overview  Goal: Plan of Care Review  Outcome: Ongoing (interventions implemented as appropriate)  3 hour dialysis complete.  Patient requested to be taken off of machine 30 minutes early.  EMILY Navarro NP notified.  Blood returned.  Needles pulled.  Pressure held x 5 minutes.  Hemostasis achieved.  Covered with gauze and paper tape.  +thrill +bruit.  Net UF 1.5L.  Paricalcitriol given as ordered.  Tolerated well.  Transported from dialysis unit to room 1007 A vis stretcher by transporter.

## 2018-04-05 NOTE — PT/OT/SLP PROGRESS
Occupational Therapy      Patient Name:  Vaughn Retana   MRN:  [id number]    Patient not seen today secondary to  (pt away at dialysis in morning & refused in afternoon per PT.  Then pt discharged from hospital).   WILBUR Mitchell  4/5/2018

## 2018-04-05 NOTE — PHYSICIAN QUERY
PT Name: Vaughn Retana  MR #: [id number]    Physician Query Form - Neurological Condition Clarification       CDS/: Bhavya Ramon               Contact information:[email or web address]    This form is a permanent document in the medical record.     Query Date: April 5, 2018    By submitting this query, we are merely seeking further clarification of documentation. Please utilize your independent clinical judgment when addressing the question(s) below.    The Medical record contains the following:   Indicators   Supporting Clinical Findings Location in Medical Record   X AMS, Confusion, LOC, etc. Acute encephalopathy H&P   X Acute / Chronic Illness SIRS H&P    Radiology Findings     X Electrolyte Imbalance Hypokalemia   Hypocalcemia  H&P  PN 4/5   X Medication Calcium gluconate IV  Potassium Bicarbonate Po MAR 4/4  MAR 4/3    Treatment      Other       Provider, please specify the diagnosis or diagnoses associated with above clinical findings.    [  ] Metabolic Encephalopathy    [X  ] Other Encephalopathy    [  ] Unspecified Encephalopathy    [  ] Other (please specify): _____________________________________    [  ] Clinically Undetermined      Please document in your progress notes daily for the duration of treatment until resolved, and  include in your discharge summary.

## 2018-04-05 NOTE — PROGRESS NOTES
[facility] HEMODIALYSIS NOTE     Patient currently on hemodialysis for removal of uremic toxins and volume.     Patient seen and evaluated on hemodialysis, tolerating treatment, see HD flowsheet for vitals and assessments.      Ultrafiltration goal is 2L     Labs have been reviewed and the dialysate bath has been adjusted.     Assessment/Plan:  Seen on dialysis this morning, tolerating well w/o complaints.  Oxygenating well on RA.  No reported flow errors from his access.    BMM  Sensipar discontinued yesterday in the setting of hypocalcemia. PTH elevated.  Will give Zemplar 2 mcg today with HD.      VANITA Valentine, FN-BC  Nephrology  Pager:  [phone]

## 2018-04-05 NOTE — PT/OT/SLP PROGRESS
Physical Therapy      Patient Name:  Vaughn Retana   MRN:  [id number]    Patient not seen today secondary to Other (Comment). On first attempt, pt away at dialysis. On second attempt, pt laying in bed and reported being (I) with mobility and feeling incr fatigue from dialysis. PT agreed to perform evaluation on following day if pt remains in the hospital.  Will follow-up on next scheduled visit.    Marah Gonzalez, PT, DPT  4/5/2018

## 2018-04-05 NOTE — PROGRESS NOTES
"[facility] Hospital Medicine  Progress Note    Patient Name: Vaughn Retana  MRN: [id number]  Patient Class: IP- Inpatient   Admission Date: 4/3/2018  Length of Stay: 2 days  Attending Physician: Deysi Alba MD  Primary Care Provider: Henny Rincon MD    [facility] Medicine Team: [facility] MED 2 Cesar Valdez MD    Subjective:     Principal Problem:SIRS (systemic inflammatory response syndrome)    HPI:  Vaughn Retana is a 52 year old male with a medical history significant for reflux esophagitis (repeated admissions for hematemesis and upper endoscopies), ESRD (MWF dialysis - 2/2 to HTN and type II DM), hx ICH (also multiple admissions, presumed from HTN), and left BKA (12/18/2013), who presents to the ED on 4/3 from dialysis center due to altered mental status. Per EMS, dialysis nurse was concerned patient was "under the influence." Dialysis session ended early as patient was not acting himself, febrile, and vomited on machine. History gathered largely from chart review and interview with patient's niece over the phone as the patient is mostly unresponsive, waxes and wanes, and not engaged during interview. According to patient's nieceDaisy, Mr. Retana had been doing well the last time he had seen him yesterday. She states that up until last year, he was living by himself and she took her uncle in on behest of his PCP who stated that patient was unable to take care of himself properly. The patient frequently leaves at odd hours and takes the bus to see his friends in [place], sometimes before anyone gets up. This was the case this morning; when she went to wake up him up at 6 am he was gone and had left to see his friends whom she says she does not know and find to be suspicious (as does patient's sister Alicia, whom I also talked to on the phone). Unclear if her uncle or friend have history of substance abuse.  Niece also mentions that uncle woke her up Monday morning at 6 " "am, banging on the door, after being up at unknown location overnight. At the time, she did not notice anything awry with his health or mentation. That day, he missed his dialysis due to missed transportation. Niece rescheduled for today (4/3), but uncle stated to him that he had a ride and later arrived to his dialysis center in the state described above. She notes that the  patient has terrible eating habits (unhealthy, greasy foods) as well as poor hand hygiene. Patient used to be a heavy drinker but stopped several years ago. From what I am able to gather from the patient himself, he states he endorses nausea, body aches, some mild abdominal pain. Denies diarrhea to me (although endorsed it to the ED physician). He states he still produces "a little urine." Denies dysuria, chest pain, shortness of breath.    On arrival to ED, his HR was 112, temperature was 101.2F, and /101. He had intermittent dry coughing spells but was sat'ing well on room air. CTH unremarkable for acute intracranial abnormalities such as hemorrhages as seen in past. CXR and LA were both unremarkable. Given rectal acetaminophen and dose of IV ceftriaxone and vancomycin as well as 30 cc/kg fluid bolus (~2L).     Hospital Course:  No notes on file    Interval History: NAEON. Patient states he had some diarrhea yesterday, no blood, mucus, or foul odor. Denies further medical complaints. Receiving HD this morning, likely discharge this afternoon.     Review of Systems   Constitutional: Negative for activity change, fatigue and fever.   HENT: Negative for congestion.    Respiratory: Negative for cough.    Cardiovascular: Negative for chest pain.   Gastrointestinal: Positive for diarrhea. Negative for abdominal pain, nausea and vomiting.   Genitourinary: Negative for decreased urine volume.   Musculoskeletal: Negative for arthralgias and back pain.   Skin: Negative for color change and rash.   Neurological: Negative for weakness. "   Psychiatric/Behavioral: Negative for confusion.     Objective:     Vital Signs (Most Recent):  Temp: 98.4 °F (36.9 °C) (04/05/18 1209)  Pulse: 82 (04/05/18 1209)  Resp: 16 (04/05/18 1209)  BP: (!) 155/82 (04/05/18 1209)  SpO2: (!) 94 % (04/05/18 0727) Vital Signs (24h Range):  Temp:  [97.8 °F (36.6 °C)-98.6 °F (37 °C)] 98.4 °F (36.9 °C)  Pulse:  [75-83] 82  Resp:  [16-18] 16  SpO2:  [94 %-99 %] 94 %  BP: (118-160)/(65-87) 155/82     Weight: 69 kg (152 lb 1.9 oz)  Body mass index is 24.55 kg/m².    Intake/Output Summary (Last 24 hours) at 04/05/18 1416  Last data filed at 04/05/18 1135   Gross per 24 hour   Intake              940 ml   Output             2280 ml   Net            -1340 ml      Physical Exam   Constitutional: He is oriented to person, place, and time. No distress.   HENT:   Head: Normocephalic and atraumatic.   Eyes: EOM are normal. Pupils are equal, round, and reactive to light.   Neck: Normal range of motion. Neck supple. No JVD present.   Cardiovascular: Normal rate, regular rhythm and normal heart sounds.    Pulmonary/Chest: Effort normal and breath sounds normal. He has no wheezes. He has no rales.   Abdominal: Soft. Bowel sounds are normal. He exhibits no distension.   Musculoskeletal: Normal range of motion.   Neurological: He is alert and oriented to person, place, and time.   Skin: Skin is warm and dry. He is not diaphoretic.   Vitals reviewed.      Significant Labs:   Recent Results (from the past 24 hour(s))   Comprehensive metabolic panel    Collection Time: 04/05/18  5:42 AM   Result Value Ref Range    Sodium 138 136 - 145 mmol/L    Potassium 3.3 (L) 3.5 - 5.1 mmol/L    Chloride 98 95 - 110 mmol/L    CO2 27 23 - 29 mmol/L    Glucose 119 (H) 70 - 110 mg/dL    BUN, Bld 30 (H) 6 - 20 mg/dL    Creatinine 10.4 (H) 0.5 - 1.4 mg/dL    Calcium 6.4 (LL) 8.7 - 10.5 mg/dL    Total Protein 6.5 6.0 - 8.4 g/dL    Albumin 2.6 (L) 3.5 - 5.2 g/dL    Total Bilirubin 0.3 0.1 - 1.0 mg/dL    Alkaline  Phosphatase 147 (H) 55 - 135 U/L    AST 25 10 - 40 U/L    ALT 9 (L) 10 - 44 U/L    Anion Gap 13 8 - 16 mmol/L    eGFR if African American 5.8 (A) >60 mL/min/1.73 m^2    eGFR if non African American 5.0 (A) >60 mL/min/1.73 m^2   Magnesium    Collection Time: 04/05/18  5:42 AM   Result Value Ref Range    Magnesium 1.9 1.6 - 2.6 mg/dL   Phosphorus    Collection Time: 04/05/18  5:42 AM   Result Value Ref Range    Phosphorus 4.2 2.7 - 4.5 mg/dL   CBC auto differential    Collection Time: 04/05/18  5:42 AM   Result Value Ref Range    WBC 4.41 3.90 - 12.70 K/uL    RBC 2.45 (L) 4.60 - 6.20 M/uL    Hemoglobin 8.0 (L) 14.0 - 18.0 g/dL    Hematocrit 23.8 (L) 40.0 - 54.0 %    MCV 97 82 - 98 fL    MCH 32.7 (H) 27.0 - 31.0 pg    MCHC 33.6 32.0 - 36.0 g/dL    RDW 14.6 (H) 11.5 - 14.5 %    Platelets 160 150 - 350 K/uL    MPV 10.7 9.2 - 12.9 fL    Immature Granulocytes 0.2 0.0 - 0.5 %    Gran # (ANC) 2.0 1.8 - 7.7 K/uL    Immature Grans (Abs) 0.01 0.00 - 0.04 K/uL    Lymph # 1.1 1.0 - 4.8 K/uL    Mono # 0.6 0.3 - 1.0 K/uL    Eos # 0.6 (H) 0.0 - 0.5 K/uL    Baso # 0.02 0.00 - 0.20 K/uL    nRBC 0 0 /100 WBC    Gran% 46.2 38.0 - 73.0 %    Lymph% 25.4 18.0 - 48.0 %    Mono% 14.1 4.0 - 15.0 %    Eosinophil% 13.6 (H) 0.0 - 8.0 %    Basophil% 0.5 0.0 - 1.9 %    Differential Method Automated          Significant Imaging: I have reviewed all pertinent imaging results/findings within the past 24 hours.    Assessment/Plan:      * SIRS (systemic inflammatory response syndrome)    - Patient with fever of 101F in ED which eventually resolved after rectal Tylenol.  - Without clear source for infection.    - Patient with dry hacking cough in room, unclear time frame. Denies dysuria with little UOP he still produces.    - AVF non-erythematous or warm to touch and no other rashes present.    - CXR and LA X2 unremarkable and without leukocytosis.   - Pro-calcitonin minimally elevated 0.4   - UA ordered but patient not producing urine and nothing even  with straight cath.  - Given dose of ceftriaxone + vancomycin in ED with sepsis bolus.   - Considering mild abdominal pain, n/v - ordered CT Abdomen - without evidence of colitis or other infection. Constipation noted.  - Patient has remained afebrile since acetaminophen suppository with tachycardia resolved after fluids.  - Considering questionable social history, drug-induced hyperthermia in differential (ie cocaine, PCP, morphine w/d) although would expect agitation with fever rather than depressed state in this patient.  - Infectious w/u is negative and pt much improved and oriented this morning. This is likely viral syndrome.   - Can d/c home today with close f/u with PCP            Renovascular hypertension    - Continue lisinopril 40 mg PO nightly.  - Nephrology consult for HD          Hypokalemia    - 2.9 on admission.   - Replenish prn; more conservative replenishment in setting of ESRD and decreased excretion.           Chronic kidney disease-mineral and bone disorder    - Corrected calcium 8.3; ionized calcium attained.   - Nephrology consulted.          Reflux esophagitis    - s/p 4 admission in the past year. Last EGD 3/16 - grade C reflux esophagitis.  - Holding home thorazine (for nausea) in setting of altered mentation.  - Continue pantoprazole 40 mg PO BID.           History of Intracerebral Hemorrhage: L BG 5/2013; R BG 9/2016; R BG 11/2016; L caudate head 2/2017    - No evidence of ICH on CTH here.           Hypocalcemia    - Ionized 0.75  - 1g calcium gluconate IV given overnight.  - Likely from renal disease.        Acute encephalopathy    -Resolved            History of left below knee amputation 12/18/13    - Niece states it was secondary to gangrene; she believes it was caused by the diabetes he used to have.          ESRD on hemodialysis    - MWF dialysis   - HD per Nephrology           Dyslipidemia    - Continue atorvastatin 80 mg PO daily.         Secondary hyperparathyroidism of renal  origin    - Continue cinacalcet 60 mg daily.        Anemia in chronic kidney disease    - Hemoglobin 9.5 on presentation here which is his baseline.   - Daily CBC.             VTE Risk Mitigation         Ordered     heparin (porcine) injection 5,000 Units  Every 8 hours     Route:  Subcutaneous        04/03/18 2026     IP VTE HIGH RISK PATIENT  Once      04/03/18 2026        Dispo: Discharge home today.     Cesar Valdez MD  Department of Hospital Medicine   [facility]

## 2018-04-05 NOTE — PLAN OF CARE
Problem: Patient Care Overview  Goal: Plan of Care Review  Outcome: Ongoing (interventions implemented as appropriate)  Patient asleep through most of the night. Lethargic, but easily aroused to voice. Neuro status checked every 4 hours, no changes noted. Occasionally disoriented to situation. Pt currently incontinent of bowel had 2 small liquid Bms, pt refused the bed pan stating he doesn't know when he needs to go. Vital signs stable, no distress noted, free from fall. Will continue to monitor.

## 2018-04-05 NOTE — SUBJECTIVE & OBJECTIVE
Interval History: NAEON. Patient states he had some diarrhea yesterday, no blood, mucus, or foul odor. Denies further medical complaints. Receiving HD this morning, likely discharge this afternoon.     Review of Systems   Constitutional: Negative for activity change, fatigue and fever.   HENT: Negative for congestion.    Respiratory: Negative for cough.    Cardiovascular: Negative for chest pain.   Gastrointestinal: Positive for diarrhea. Negative for abdominal pain, nausea and vomiting.   Genitourinary: Negative for decreased urine volume.   Musculoskeletal: Negative for arthralgias and back pain.   Skin: Negative for color change and rash.   Neurological: Negative for weakness.   Psychiatric/Behavioral: Negative for confusion.     Objective:     Vital Signs (Most Recent):  Temp: 98.4 °F (36.9 °C) (04/05/18 1209)  Pulse: 82 (04/05/18 1209)  Resp: 16 (04/05/18 1209)  BP: (!) 155/82 (04/05/18 1209)  SpO2: (!) 94 % (04/05/18 0727) Vital Signs (24h Range):  Temp:  [97.8 °F (36.6 °C)-98.6 °F (37 °C)] 98.4 °F (36.9 °C)  Pulse:  [75-83] 82  Resp:  [16-18] 16  SpO2:  [94 %-99 %] 94 %  BP: (118-160)/(65-87) 155/82     Weight: 69 kg (152 lb 1.9 oz)  Body mass index is 24.55 kg/m².    Intake/Output Summary (Last 24 hours) at 04/05/18 1416  Last data filed at 04/05/18 1135   Gross per 24 hour   Intake              940 ml   Output             2280 ml   Net            -1340 ml      Physical Exam   Constitutional: He is oriented to person, place, and time. No distress.   HENT:   Head: Normocephalic and atraumatic.   Eyes: EOM are normal. Pupils are equal, round, and reactive to light.   Neck: Normal range of motion. Neck supple. No JVD present.   Cardiovascular: Normal rate, regular rhythm and normal heart sounds.    Pulmonary/Chest: Effort normal and breath sounds normal. He has no wheezes. He has no rales.   Abdominal: Soft. Bowel sounds are normal. He exhibits no distension.   Musculoskeletal: Normal range of motion.    Neurological: He is alert and oriented to person, place, and time.   Skin: Skin is warm and dry. He is not diaphoretic.   Vitals reviewed.      Significant Labs:   Recent Results (from the past 24 hour(s))   Comprehensive metabolic panel    Collection Time: 04/05/18  5:42 AM   Result Value Ref Range    Sodium 138 136 - 145 mmol/L    Potassium 3.3 (L) 3.5 - 5.1 mmol/L    Chloride 98 95 - 110 mmol/L    CO2 27 23 - 29 mmol/L    Glucose 119 (H) 70 - 110 mg/dL    BUN, Bld 30 (H) 6 - 20 mg/dL    Creatinine 10.4 (H) 0.5 - 1.4 mg/dL    Calcium 6.4 (LL) 8.7 - 10.5 mg/dL    Total Protein 6.5 6.0 - 8.4 g/dL    Albumin 2.6 (L) 3.5 - 5.2 g/dL    Total Bilirubin 0.3 0.1 - 1.0 mg/dL    Alkaline Phosphatase 147 (H) 55 - 135 U/L    AST 25 10 - 40 U/L    ALT 9 (L) 10 - 44 U/L    Anion Gap 13 8 - 16 mmol/L    eGFR if African American 5.8 (A) >60 mL/min/1.73 m^2    eGFR if non African American 5.0 (A) >60 mL/min/1.73 m^2   Magnesium    Collection Time: 04/05/18  5:42 AM   Result Value Ref Range    Magnesium 1.9 1.6 - 2.6 mg/dL   Phosphorus    Collection Time: 04/05/18  5:42 AM   Result Value Ref Range    Phosphorus 4.2 2.7 - 4.5 mg/dL   CBC auto differential    Collection Time: 04/05/18  5:42 AM   Result Value Ref Range    WBC 4.41 3.90 - 12.70 K/uL    RBC 2.45 (L) 4.60 - 6.20 M/uL    Hemoglobin 8.0 (L) 14.0 - 18.0 g/dL    Hematocrit 23.8 (L) 40.0 - 54.0 %    MCV 97 82 - 98 fL    MCH 32.7 (H) 27.0 - 31.0 pg    MCHC 33.6 32.0 - 36.0 g/dL    RDW 14.6 (H) 11.5 - 14.5 %    Platelets 160 150 - 350 K/uL    MPV 10.7 9.2 - 12.9 fL    Immature Granulocytes 0.2 0.0 - 0.5 %    Gran # (ANC) 2.0 1.8 - 7.7 K/uL    Immature Grans (Abs) 0.01 0.00 - 0.04 K/uL    Lymph # 1.1 1.0 - 4.8 K/uL    Mono # 0.6 0.3 - 1.0 K/uL    Eos # 0.6 (H) 0.0 - 0.5 K/uL    Baso # 0.02 0.00 - 0.20 K/uL    nRBC 0 0 /100 WBC    Gran% 46.2 38.0 - 73.0 %    Lymph% 25.4 18.0 - 48.0 %    Mono% 14.1 4.0 - 15.0 %    Eosinophil% 13.6 (H) 0.0 - 8.0 %    Basophil% 0.5 0.0 - 1.9 %     Differential Method Automated          Significant Imaging: I have reviewed all pertinent imaging results/findings within the past 24 hours.

## 2018-04-05 NOTE — PLAN OF CARE
Future Appointments  Date Time Provider Department Center   4/18/2018 4:00 PM Ray Odonnell MD [facility]   5/4/2018 3:00 PM Henny Rincon MD [facility] IM [facility]   6/26/2018 9:45 AM Nalini Scott DPM [facility] POD [facility]        04/05/18 1435   Final Note   Assessment Type Final Discharge Note   Discharge Disposition Home   What phone number can be called within the next 1-3 days to see how you are doing after discharge? [phone]   Hospital Follow Up  Appt(s) scheduled? Yes   Right Care Referral Info   Post Acute Recommendation No Care

## 2018-04-08 LAB
BACTERIA BLD CULT: NORMAL
BACTERIA BLD CULT: NORMAL

## 2018-04-09 ENCOUNTER — PATIENT OUTREACH (OUTPATIENT)
Dept: ADMINISTRATIVE | Facility: CLINIC | Age: 53
End: 2018-04-09

## 2018-04-09 DIAGNOSIS — A41.9 SEPSIS, DUE TO UNSPECIFIED ORGANISM: Primary | ICD-10-CM

## 2018-04-09 NOTE — PHYSICIAN QUERY
PT Name: Vaughn Retana  MR #: [id number]     Physician Query Form - Diagnosis Clarification      CDS/: Bhavya Ramon               Contact information:[email or web address]    This form is a permanent document in the medical record.     Query Date: April 9, 2018    By submitting this query, we are merely seeking further clarification of documentation.  Please utilize your independent clinical judgment when addressing the question(s) below.     The medical record contains the following:      Findings Supporting Clinical Information Location in Medical Record   Likely viral syndrome     Dialysis session ended early as patient was not acting himself, febrile, and vomited on machine    From what I am able to gather from the patient himself, he states he endorses nausea, body aches, some mild abdominal pain  On arrival to ED, his HR was 112, temperature was 101.2F, and /101. He had intermittent dry coughing spells but was sat'ing well on room air    Given rectal acetaminophen and dose of IV ceftriaxone and vancomycin as well as 30 cc/kg fluid bolus (~2L).     Influenza A negative  Influenza B negative   H&P    PN 4/5        PN 4/5        PN 4/5          Discharge summary        LAB 4/4     Please clarify if the viral syndrome diagnosis has been:    [  ] Ruled In  [  ] Ruled In, Now Resolved  [  ] Ruled Out  [  ] Clinically insignificant  [X  ] Clinically undetermined  [  ] Other/Clarification of findings (please specify)_______________________________    Please document in your progress notes daily for the duration of treatment, until resolved, and include in your discharge summary.

## 2018-04-09 NOTE — PATIENT INSTRUCTIONS
Understanding Sepsis  Sepsis is a severe response the body has to an infection. It is most often caused by bacteria. It is also known as septicemia, or systemic inflammatory response syndrome (SIRS). Sepsis is a medical emergency. It needs to be treated right away.  What is sepsis?  Sepsis is when the body reacts to an infection with a severe inflammatory response. It can be caused by bacteria, fungus, or a virus. Sepsis can cause many kinds of problems around the body. It can lead severe low blood pressure (shock) and organ failure. This can lead to death if not treated.  Sepsis is most common in:  · Infants and older adults  · People with an infection such as pneumonia, meningitis, or a urinary tract infection  · People who have an illness such as cancer, AIDS, or diabetes  · People being treated with chemotherapy medications or radiation  · People who have had a transplant  Symptoms of sepsis  Symptoms of sepsis can include:  · Chills and shaking  · Rapid heartbeat  · Rapid breathing  · Shortness of breath  · Severe nausea or uncontrolled vomiting  · Confusion  · Dizziness  · Decreased urination  · Severe pain, including in the back or joints   Diagnosing sepsis  If your health care provider thinks you may have sepsis, you will be given tests. You may have blood and urine tests. These are done to look for bacteria, viruses, or fungus. You may also have X-rays or other imaging tests. These may be done to look at your organs to locate the source of infection.  Treating sepsis  If you have sepsis, your health care provider will give you antibiotics through a thin, flexible tube put into a vein in your arm (IV). You will also be given fluids through the IV. You may also be given nutrition or other medications through your IV. Your health care provider will talk with you about other treatments you may need. These may include using an oxygen mask or a ventilator to help with breathing. Treatment may last at least 7  to 10 days. Sepsis must be treated in the hospital.  Date Last Reviewed: 7/15/2015  © 5593-8238 [employer]. [address] [place] [address], [place] [address]. All rights reserved. This information is not intended as a substitute for professional medical care. Always follow your healthcare professional's instructions.

## 2018-04-11 ENCOUNTER — OUTPATIENT CASE MANAGEMENT (OUTPATIENT)
Dept: ADMINISTRATIVE | Facility: OTHER | Age: 53
End: 2018-04-11

## 2018-04-11 NOTE — PROGRESS NOTES
Thank you for the referral.  Patient has been assigned to Fior Mccarty LMSW for low risk screening for Outpatient Case Management.     Reason for referral:  Sepsis, due to unspecified organism    Please contact [facility] at [phone]. [phone] with any questions.    Thank you,    Jeane Vasquez LCSW, CCM

## 2018-04-12 ENCOUNTER — OUTPATIENT CASE MANAGEMENT (OUTPATIENT)
Dept: ADMINISTRATIVE | Facility: OTHER | Age: 53
End: 2018-04-12

## 2018-04-12 NOTE — PROGRESS NOTES
This LMSW contacted patient or caregiver regarding referral. Patient/Caregiver stated there were no needs at this time. LMSW completed assessment with Pt. Pt reports living with family and reports he is independent with ADLs. Pt reports does not use any assistive devices to ambulate however he does have a walker at home. Pt reports family provides adequate support to him and declines additional resources at this time. Referral source notified.

## 2018-04-19 ENCOUNTER — SOCIAL WORK (OUTPATIENT)
Dept: TRANSPLANT | Facility: CLINIC | Age: 53
End: 2018-04-19

## 2018-04-19 NOTE — PROGRESS NOTES
Dialysis Adherence:    SW received a faxed adherence form from pt's dialysis center indicates over last three months that the patient has had no AMAs, not had any no-shows, and no issues with caregivers, transportation, or any other psychosocial concerns..      Psychosocial Suitability: Patient presents as an unacceptable candidate for [facility] at this time. Based on psychosocial risk factors, patient presents as high risk, due to needing to establish care with a psychiatrist and following through with recommendations (see other SW note).        Form also indicates:    Last intact PTH from 02/14/2018 is reported as 972.    Current dry weight is reported as 71.5kg and Most Recent Pre-treatment weight is reported as 75.20kg on 03/07/2018.

## 2018-05-03 DIAGNOSIS — K22.10 EROSIVE ESOPHAGITIS: Primary | ICD-10-CM

## 2018-05-21 ENCOUNTER — ANESTHESIA EVENT (OUTPATIENT)
Dept: ENDOSCOPY | Facility: HOSPITAL | Age: 53
DRG: 377 | End: 2018-05-21
Payer: MEDICARE

## 2018-05-21 ENCOUNTER — HOSPITAL ENCOUNTER (INPATIENT)
Facility: HOSPITAL | Age: 53
LOS: 1 days | Discharge: HOME | DRG: 377 | End: 2018-05-23
Attending: EMERGENCY MEDICINE | Admitting: EMERGENCY MEDICINE
Payer: MEDICARE

## 2018-05-21 DIAGNOSIS — K92.2 GASTROINTESTINAL HEMORRHAGE, UNSPECIFIED GASTROINTESTINAL HEMORRHAGE TYPE: ICD-10-CM

## 2018-05-21 DIAGNOSIS — K29.60 EROSIVE GASTRITIS: Primary | ICD-10-CM

## 2018-05-21 DIAGNOSIS — N18.6 ESRD ON HEMODIALYSIS: Chronic | ICD-10-CM

## 2018-05-21 DIAGNOSIS — Z99.2 ESRD ON HEMODIALYSIS: Chronic | ICD-10-CM

## 2018-05-21 DIAGNOSIS — K92.2 CHRONIC UPPER GI BLEEDING: ICD-10-CM

## 2018-05-21 DIAGNOSIS — R11.10 VOMITING: ICD-10-CM

## 2018-05-21 PROBLEM — D64.9 NORMOCYTIC ANEMIA: Status: ACTIVE | Noted: 2018-05-21

## 2018-05-21 PROBLEM — E11.29 CONTROLLED TYPE 2 DIABETES MELLITUS WITH KIDNEY COMPLICATION, WITHOUT LONG-TERM CURRENT USE OF INSULIN: Status: ACTIVE | Noted: 2018-05-21

## 2018-05-21 LAB
ABO + RH BLD: NORMAL
ALBUMIN SERPL BCP-MCNC: 3.1 G/DL
ALP SERPL-CCNC: 142 U/L
ALT SERPL W/O P-5'-P-CCNC: 7 U/L
ANION GAP SERPL CALC-SCNC: 10 MMOL/L
APTT BLDCRRT: 24 SEC
AST SERPL-CCNC: 20 U/L
BASOPHILS # BLD AUTO: 0.02 K/UL
BASOPHILS NFR BLD: 0.3 %
BILIRUB SERPL-MCNC: 0.4 MG/DL
BLD GP AB SCN CELLS X3 SERPL QL: NORMAL
BUN SERPL-MCNC: 10 MG/DL
CALCIUM SERPL-MCNC: 8.4 MG/DL
CHLORIDE SERPL-SCNC: 99 MMOL/L
CO2 SERPL-SCNC: 30 MMOL/L
CREAT SERPL-MCNC: 5.8 MG/DL
DIFFERENTIAL METHOD: ABNORMAL
EOSINOPHIL # BLD AUTO: 0.5 K/UL
EOSINOPHIL NFR BLD: 8.1 %
ERYTHROCYTE [DISTWIDTH] IN BLOOD BY AUTOMATED COUNT: 13.5 %
EST. GFR  (AFRICAN AMERICAN): 11.8 ML/MIN/1.73 M^2
EST. GFR  (NON AFRICAN AMERICAN): 10.2 ML/MIN/1.73 M^2
GLUCOSE SERPL-MCNC: 131 MG/DL
HCT VFR BLD AUTO: 26.5 %
HGB BLD-MCNC: 8.7 G/DL
IMM GRANULOCYTES # BLD AUTO: 0.02 K/UL
IMM GRANULOCYTES NFR BLD AUTO: 0.3 %
INR PPP: 1
LACTATE SERPL-SCNC: 1 MMOL/L
LIPASE SERPL-CCNC: 31 U/L
LYMPHOCYTES # BLD AUTO: 1.2 K/UL
LYMPHOCYTES NFR BLD: 19 %
MCH RBC QN AUTO: 32 PG
MCHC RBC AUTO-ENTMCNC: 32.8 G/DL
MCV RBC AUTO: 97 FL
MONOCYTES # BLD AUTO: 0.6 K/UL
MONOCYTES NFR BLD: 9.7 %
NEUTROPHILS # BLD AUTO: 3.9 K/UL
NEUTROPHILS NFR BLD: 62.6 %
NRBC BLD-RTO: 0 /100 WBC
PLATELET # BLD AUTO: 247 K/UL
PMV BLD AUTO: 10.7 FL
POCT GLUCOSE: 92 MG/DL (ref 70–110)
POCT GLUCOSE: 95 MG/DL (ref 70–110)
POTASSIUM SERPL-SCNC: 2.9 MMOL/L
POTASSIUM SERPL-SCNC: 4 MMOL/L
PROT SERPL-MCNC: 7.9 G/DL
PROTHROMBIN TIME: 10.5 SEC
RBC # BLD AUTO: 2.72 M/UL
SODIUM SERPL-SCNC: 139 MMOL/L
WBC # BLD AUTO: 6.26 K/UL

## 2018-05-21 PROCEDURE — 25000003 PHARM REV CODE 250: Performed by: PHYSICIAN ASSISTANT

## 2018-05-21 PROCEDURE — 99214 OFFICE O/P EST MOD 30 MIN: CPT | Mod: ,,, | Performed by: INTERNAL MEDICINE

## 2018-05-21 PROCEDURE — 63600175 PHARM REV CODE 636 W HCPCS: Performed by: PHYSICIAN ASSISTANT

## 2018-05-21 PROCEDURE — G0378 HOSPITAL OBSERVATION PER HR: HCPCS

## 2018-05-21 PROCEDURE — 82962 GLUCOSE BLOOD TEST: CPT

## 2018-05-21 PROCEDURE — C9113 INJ PANTOPRAZOLE SODIUM, VIA: HCPCS | Performed by: PHYSICIAN ASSISTANT

## 2018-05-21 PROCEDURE — 85025 COMPLETE CBC W/AUTO DIFF WBC: CPT

## 2018-05-21 PROCEDURE — 85730 THROMBOPLASTIN TIME PARTIAL: CPT

## 2018-05-21 PROCEDURE — 86901 BLOOD TYPING SEROLOGIC RH(D): CPT

## 2018-05-21 PROCEDURE — 80053 COMPREHEN METABOLIC PANEL: CPT

## 2018-05-21 PROCEDURE — 84132 ASSAY OF SERUM POTASSIUM: CPT

## 2018-05-21 PROCEDURE — 96365 THER/PROPH/DIAG IV INF INIT: CPT

## 2018-05-21 PROCEDURE — 25000003 PHARM REV CODE 250: Performed by: EMERGENCY MEDICINE

## 2018-05-21 PROCEDURE — 36415 COLL VENOUS BLD VENIPUNCTURE: CPT

## 2018-05-21 PROCEDURE — 85610 PROTHROMBIN TIME: CPT

## 2018-05-21 PROCEDURE — 83690 ASSAY OF LIPASE: CPT

## 2018-05-21 PROCEDURE — 93010 ELECTROCARDIOGRAM REPORT: CPT | Mod: ,,, | Performed by: INTERNAL MEDICINE

## 2018-05-21 PROCEDURE — 96361 HYDRATE IV INFUSION ADD-ON: CPT

## 2018-05-21 PROCEDURE — 83605 ASSAY OF LACTIC ACID: CPT

## 2018-05-21 PROCEDURE — 99219 PR INITIAL OBSERVATION CARE,LEVL II: CPT | Mod: ,,, | Performed by: INTERNAL MEDICINE

## 2018-05-21 PROCEDURE — 99223 1ST HOSP IP/OBS HIGH 75: CPT | Mod: AI,,, | Performed by: PHYSICIAN ASSISTANT

## 2018-05-21 PROCEDURE — 99285 EMERGENCY DEPT VISIT HI MDM: CPT | Mod: 25

## 2018-05-21 RX ORDER — HALOPERIDOL 5 MG/ML
2.5 INJECTION INTRAMUSCULAR
Status: COMPLETED | OUTPATIENT
Start: 2018-05-21 | End: 2018-05-21

## 2018-05-21 RX ORDER — ONDANSETRON 4 MG/1
4 TABLET, ORALLY DISINTEGRATING ORAL
Status: COMPLETED | OUTPATIENT
Start: 2018-05-21 | End: 2018-05-21

## 2018-05-21 RX ORDER — POTASSIUM CHLORIDE 20 MEQ/1
40 TABLET, EXTENDED RELEASE ORAL
Status: COMPLETED | OUTPATIENT
Start: 2018-05-21 | End: 2018-05-21

## 2018-05-21 RX ORDER — LISINOPRIL 20 MG/1
40 TABLET ORAL
Status: COMPLETED | OUTPATIENT
Start: 2018-05-21 | End: 2018-05-21

## 2018-05-21 RX ORDER — PROMETHAZINE HYDROCHLORIDE 12.5 MG/1
12.5 SUPPOSITORY RECTAL EVERY 6 HOURS PRN
Status: DISCONTINUED | OUTPATIENT
Start: 2018-05-21 | End: 2018-05-23 | Stop reason: HOSPADM

## 2018-05-21 RX ORDER — SODIUM CHLORIDE 0.9 % (FLUSH) 0.9 %
5 SYRINGE (ML) INJECTION
Status: DISCONTINUED | OUTPATIENT
Start: 2018-05-21 | End: 2018-05-23 | Stop reason: HOSPADM

## 2018-05-21 RX ORDER — GLUCAGON 1 MG
1 KIT INJECTION
Status: DISCONTINUED | OUTPATIENT
Start: 2018-05-21 | End: 2018-05-23 | Stop reason: HOSPADM

## 2018-05-21 RX ORDER — POLYETHYLENE GLYCOL 3350 17 G/17G
17 POWDER, FOR SOLUTION ORAL EVERY 12 HOURS PRN
Status: DISCONTINUED | OUTPATIENT
Start: 2018-05-21 | End: 2018-05-23 | Stop reason: HOSPADM

## 2018-05-21 RX ORDER — LISINOPRIL 10 MG/1
10 TABLET ORAL DAILY
Status: DISCONTINUED | OUTPATIENT
Start: 2018-05-21 | End: 2018-05-21

## 2018-05-21 RX ORDER — RAMELTEON 8 MG/1
8 TABLET ORAL NIGHTLY PRN
Status: DISCONTINUED | OUTPATIENT
Start: 2018-05-21 | End: 2018-05-23 | Stop reason: HOSPADM

## 2018-05-21 RX ORDER — LISINOPRIL 20 MG/1
40 TABLET ORAL DAILY
Status: DISCONTINUED | OUTPATIENT
Start: 2018-05-22 | End: 2018-05-23 | Stop reason: HOSPADM

## 2018-05-21 RX ORDER — ACETAMINOPHEN 325 MG/1
650 TABLET ORAL EVERY 4 HOURS PRN
Status: DISCONTINUED | OUTPATIENT
Start: 2018-05-21 | End: 2018-05-23 | Stop reason: HOSPADM

## 2018-05-21 RX ORDER — ONDANSETRON 8 MG/1
8 TABLET, ORALLY DISINTEGRATING ORAL EVERY 8 HOURS PRN
Status: DISCONTINUED | OUTPATIENT
Start: 2018-05-21 | End: 2018-05-23 | Stop reason: HOSPADM

## 2018-05-21 RX ORDER — INSULIN ASPART 100 [IU]/ML
0-5 INJECTION, SOLUTION INTRAVENOUS; SUBCUTANEOUS EVERY 6 HOURS PRN
Status: DISCONTINUED | OUTPATIENT
Start: 2018-05-21 | End: 2018-05-23 | Stop reason: HOSPADM

## 2018-05-21 RX ORDER — POTASSIUM CHLORIDE 20 MEQ/1
40 TABLET, EXTENDED RELEASE ORAL ONCE
Status: DISCONTINUED | OUTPATIENT
Start: 2018-05-21 | End: 2018-05-21

## 2018-05-21 RX ADMIN — HALOPERIDOL LACTATE 2.5 MG: 5 INJECTION, SOLUTION INTRAMUSCULAR at 11:05

## 2018-05-21 RX ADMIN — LISINOPRIL 40 MG: 20 TABLET ORAL at 01:05

## 2018-05-21 RX ADMIN — DEXTROSE 40 MG: 50 INJECTION, SOLUTION INTRAVENOUS at 09:05

## 2018-05-21 RX ADMIN — POTASSIUM CHLORIDE 40 MEQ: 1500 TABLET, EXTENDED RELEASE ORAL at 12:05

## 2018-05-21 RX ADMIN — PANTOPRAZOLE SODIUM 40 MG: 40 INJECTION, POWDER, FOR SOLUTION INTRAVENOUS at 11:05

## 2018-05-21 RX ADMIN — ONDANSETRON 4 MG: 4 TABLET, ORALLY DISINTEGRATING ORAL at 11:05

## 2018-05-21 RX ADMIN — SODIUM CHLORIDE 1000 ML: 0.9 INJECTION, SOLUTION INTRAVENOUS at 11:05

## 2018-05-21 NOTE — TREATMENT PLAN
Treatment Plan  05/21/2018  4:18 PM    CLD today, NPO after MN for EGD tomorrow.  PPI IV BID.  Full consult note to follow.    Rodri Sheets M.D.  Gastroenterology Fellow, PGY-IV  Pager: [phone]  [facility]

## 2018-05-21 NOTE — ANESTHESIA PREPROCEDURE EVALUATION
[facility]  Anesthesia Pre-Operative Evaluation         Patient Name: Vaughn Retana  YOB: 1965  MRN: [id number]    SUBJECTIVE:     Pre-operative evaluation for Procedure(s) (LRB):  ESOPHAGOGASTRODUODENOSCOPY (EGD) (N/A)     05/21/2018    Vaughn Retana is a 52 y.o. male w/ a significant PMHx of PUD (EGD scheduled for 6/12/18 for ulcer sealing), reflux esophagitis, T2DM with diabetic kidney disease on HD MWF and h/o L BKA, h/o ICH (suspected due to HTN)   Patient now presents for the above procedure(s).Presented with nausea and hematemesis, concern for GI bleed.      LDA:   20 G Lt AC    Prev airway: None documented.    Drips: None documented.      Patient Active Problem List   Diagnosis    Anemia in chronic kidney disease    Secondary hyperparathyroidism of renal origin    Dyslipidemia    ESRD on hemodialysis    Peripheral vascular disease in diabetes mellitus    History of left below knee amputation 12/18/13    Acute encephalopathy    SIRS (systemic inflammatory response syndrome)    Singultus    Hypocalcemia    History of Intracerebral Hemorrhage: L BG 5/2013; R BG 9/2016; R BG 11/2016; L caudate head 2/2017    Secondary pulmonary hypertension    Reflux esophagitis    DVT (deep venous thrombosis)    Chronic kidney disease-mineral and bone disorder    Hypokalemia    Renovascular hypertension    Chronic upper GI bleeding    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, without long-term current use of insulin       Review of patient's allergies indicates:   Allergen Reactions    Fosrenol [lanthanum] Nausea And Vomiting     Nausea and vomiting       Current Inpatient Medications:   [START ON 5/22/2018] lisinopril  40 mg Oral Daily    pantoprazole 40 mg in dextrose 5 % 100 mL infusion (ready to mix system)  40 mg Intravenous BID    potassium chloride  40 mEq Oral Once       No current facility-administered medications on file prior to  encounter.      Current Outpatient Prescriptions on File Prior to Encounter   Medication Sig Dispense Refill    lisinopril (PRINIVIL,ZESTRIL) 40 MG tablet Take 1 tablet (40 mg total) by mouth every evening. 90 tablet 4    ondansetron (ZOFRAN) 8 MG tablet Take 1 tablet (8 mg total) by mouth every 8 (eight) hours as needed for Nausea. 15 tablet 0    pantoprazole (PROTONIX) 40 MG tablet Take 1 tablet (40 mg total) by mouth 2 (two) times daily before meals. 60 tablet 11       Past Surgical History:   Procedure Laterality Date    COLONOSCOPY      COLONOSCOPY N/A 4/4/2017    Procedure: COLONOSCOPY;  Surgeon: Walker Stern MD;  Location: [address]);  Service: Endoscopy;  Laterality: N/A;  PA Systolic Pressure 85.56. HD Patient MWF, K+ lab prior to procedure.     FOOT AMPUTATION THROUGH METATARSAL      left foot    LEG AMPUTATION THROUGH KNEE  12/18/2013    left BKA    R AVF  9/12/12       Social History     Social History    Marital status:      Spouse name: N/A    Number of children: N/A    Years of education: N/A     Occupational History    Not on file.     Social History Main Topics    Smoking status: Former Smoker     Packs/day: 1.00     Years: 10.00    Smokeless tobacco: Never Used    Alcohol use No    Drug use: No    Sexual activity: Yes     Partners: Female     Birth control/ protection: None     Other Topics Concern    Not on file     Social History Narrative    No narrative on file       OBJECTIVE:     Vital Signs Range (Last 24H):  Temp:  [36.4 °C (97.6 °F)-36.9 °C (98.5 °F)]   Pulse:  [82-92]   Resp:  [15-18]   BP: (182-208)/()   SpO2:  [98 %-100 %]       CBC:   Recent Labs      05/21/18   1131   WBC  6.26   RBC  2.72*   HGB  8.7*   HCT  26.5*   PLT  247   MCV  97   MCH  32.0*   MCHC  32.8       CMP:   Recent Labs      05/21/18   1131  05/21/18   1550   NA  139   --    K  2.9*  4.0   CL  99   --    CO2  30*   --    BUN  10   --    CREATININE  5.8*   --    GLU  131*   --     CALCIUM  8.4*   --    ALBUMIN  3.1*   --    PROT  7.9   --    ALKPHOS  142*   --    ALT  7*   --    AST  20   --    BILITOT  0.4   --        INR:  Recent Labs      05/21/18   1131   INR  1.0   APTT  24.0       Diagnostic Studies: No relevant studies.    EKG: No recent studies available.    2D ECHO:  Results for orders placed or performed during the hospital encounter of 07/24/17   Echo doppler color flow   Result Value Ref Range    EF 65 55 - 65    Diastolic Dysfunction No          ASSESSMENT/PLAN:         Anesthesia Evaluation         Review of Systems  Hematology/Oncology:        Hematology Comments: Hx of DVT   Cardiovascular:   Hypertension CHF (Chronic diastolic )   Renal/:   Chronic Renal Disease, ESRD, Dialysis    Hepatic/GI:   Esophagitis   Neurological:   CVA Hemiparesis affecting left side as late effect of stroke  Closed head injury   Endocrine:   Diabetes, type 2        Physical Exam  General:  Well nourished    Airway/Jaw/Neck:  Airway Findings: Mouth Opening: Normal Tongue: Normal  General Airway Assessment: Adult  Mallampati: I  TM Distance: Normal, at least 6 cm  Jaw/Neck Findings:  Neck ROM: Normal ROM      Dental:  Dental Findings: In tact        Mental Status:  Mental Status Findings:  Cooperative, Alert and Oriented         Anesthesia Plan  Type of Anesthesia, risks & benefits discussed:  Anesthesia Type:  general, MAC  Patient's Preference:   Intra-op Monitoring Plan: standard ASA monitors  Intra-op Monitoring Plan Comments:   Post Op Pain Control Plan: per primary service following discharge from PACU  Post Op Pain Control Plan Comments:   Induction:   IV  Beta Blocker:  Patient is not currently on a Beta-Blocker (No further documentation required).       Informed Consent: Patient understands risks and agrees with Anesthesia plan.  Questions answered. Anesthesia consent signed with patient.  ASA Score: 3     Day of Surgery Review of History & Physical:    H&P update referred to the surgeon.   H&P completed by Anesthesiologist.       Ready For Surgery From Anesthesia Perspective.

## 2018-05-21 NOTE — ASSESSMENT & PLAN NOTE
Last head CT 4/3/2018: multifocal regions of encephalomalacia, suggesting remote lacunar infarcts noting encephalomalacia in the region of previous left intraparenchymal/thalamic hemorrhage.  Reviewed most recent admission in which pt admitted for AMS.  It was documented that family has been noticing behavior changes for ~1 year.  Pt denies drug/alcohol, urine tox ordered.  Low threshold to order CT head.  Neuro checks q 4 hrs.  Pt falling asleep during interview and upon entering room pt had blanket over his head.  When provider was leaving the room, pt became much more alert asking when he would be able to eat.

## 2018-05-21 NOTE — HPI
53 y/o M with PMH PUD (EGD scheduled for 6/12/18 for ulcer sealing), reflux esophagitis, T2DM with diabetic kidney disease on HD MWF and h/o L BKA, h/o ICH (suspected due to HTN) presents with hematemesis x 4 days.  Pt reports last episode of vomiting was yesterday.  He reports noncompliance with bid PPI stating he last took ~1 week ago.  +epigastric pain after vomiting.  No fever, chills, diarrhea, SOB, dizziness, lightheadedness.  Of note this is pt's 5th admission since Feb 2018.  Noted last admission pt with AMS and reports from family stated behavior change x 1 year.  Pt stopped drinking alcohol several years ago and denies drug use.    In the ED, pt given ODT zofran, IV haldol and IV protonix which resolved nausea/vomiting and abdominal pain.  Hgb 8.7 (close to baseline).  K 2.9.  Lipase and lactic acid negative.  CXR with right subclavian vascular stent as before.  Heart is not enlarged.  Mild accentuation interstitial markings.  No significant airspace consolidation or pleural effusion identified.  /103 upon arrival (completed 1 hr of HD today and had not taken lisinopril).

## 2018-05-21 NOTE — ED PROVIDER NOTES
Encounter Date: 5/21/2018       History     Chief Complaint   Patient presents with    Vomiting     dark colored emesis. Sent from dialysis, has 270 cc removed (1 hour dialysis). Reports abdominal pain also.      Mr Retana is a 52YOAAM who presents with nausea and vomiting for 3 days, pertinent PMHx reflux esophagitis, PUD, DM 2, ESRD on dialysis (MWF). He was recently discharged after admission for AMS, lethargy and vomiting and diagnosed with viral syndrome. Of note, he has had multiple admission since January for nausea and hematemesis with several upper GI scopes. Patient reports intractable N/V since Friday accompanied by LUQ abdominal pain. He states the pain is intermittent and is most aggravated by vomiting. He denies any difference in current episode from prior episodes. He did not take his Rx Protonix and Reglan given at last admission. He denies CP, SOB, back pain, melena, hematuria, dysuria. Patient had one hour of dialysis this morning, before he was referred to ED for vomiting episode. Of note, Patient has scheduled EGD for ulcer sealing June 12.          Review of patient's allergies indicates:   Allergen Reactions    Fosrenol [lanthanum] Nausea And Vomiting     Nausea and vomiting     Past Medical History:   Diagnosis Date    Amputation stump pain 9/10/2013    Aspiration pneumonia 7/27/2015    Asterixis 11/8/2016    C. difficile colitis 8/7/2015    Cholelithiasis without obstruction 8/25/2015    Chronic diastolic heart failure     2-23-17   1 - Low normal to mildly depressed left ventricular systolic function (EF 50-55%).    2 - Right ventricular enlargement with mildly depressed systolic function.    3 - Left ventricular diastolic dysfunction.    4 - Right atrial enlargement.    5 - Severe tricuspid regurgitation.    6 - Pulmonary hypertension. The estimated PA systolic pressure is 86 mmHg.    7 - Increased central venous pressure.     Chronic low back pain 12/1/2015    Closed head  injury 9/8/2016    ESRD on hemodialysis 2/7/2013    MWF at [facility]   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    Metabolic acidosis, IAG, reduced excretion of inorganic acids     Myoclonic jerking 9/20/2016    Secondary hyperparathyroidism (of renal origin)     Secondary pulmonary hypertension 3/23/2017    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    TB lung, latent 08/25/2015    Negative Quantiferon Gold 3-23-17     Past Surgical History:   Procedure Laterality Date    COLONOSCOPY      COLONOSCOPY N/A 4/4/2017    Procedure: COLONOSCOPY;  Surgeon: Walker Stern MD;  Location: [facility] ([address] [place] [address]);  Service: Endoscopy;  Laterality: N/A;  PA Systolic Pressure 85.56. HD Patient MWF, K+ lab prior to procedure.     FOOT AMPUTATION THROUGH METATARSAL      left foot    LEG AMPUTATION THROUGH KNEE  12/18/2013    left BKA    R AVF  9/12/12     Family History   Problem Relation Age of Onset    Early death Mother     Kidney disease Father     Hypertension Father     Heart disease Father     Hyperlipidemia Father     Diabetes Brother     Alcohol abuse Maternal Grandmother     Diabetes Maternal Grandfather     Hypertension Sister     Melanoma Neg Hx      Social History   Substance Use Topics    Smoking status: Former Smoker     Packs/day: 1.00     Years: 10.00    Smokeless tobacco: Never Used    Alcohol use No     Review of Systems   Constitutional: Negative for appetite change, chills, diaphoresis, fatigue and fever.   HENT: Negative for congestion, nosebleeds, rhinorrhea, sinus pain, sinus pressure, sore throat and voice change.     Eyes: Negative for photophobia and visual disturbance.   Respiratory: Negative for cough, choking, chest tightness, shortness of breath and wheezing.    Cardiovascular: Negative for chest pain, palpitations and leg swelling.   Gastrointestinal: Positive for abdominal pain (LUQ), nausea and vomiting. Negative for anal bleeding and blood in stool.   Genitourinary: Negative for dysuria, flank pain, hematuria and urgency.   Musculoskeletal: Negative for arthralgias, back pain, myalgias, neck pain and neck stiffness.   Skin: Negative for color change, rash and wound.   Allergic/Immunologic: Immunocompromised state: DM 2.   Neurological: Negative for dizziness, syncope, weakness, light-headedness, numbness and headaches.   Psychiatric/Behavioral: Negative for agitation, confusion and decreased concentration. The patient is not nervous/anxious.        Physical Exam     Initial Vitals [05/21/18 1033]   BP Pulse Resp Temp SpO2   (!) 200/103 92 18 98.1 °F (36.7 °C) 98 %      MAP       135.33         Physical Exam    Nursing note and vitals reviewed.  Constitutional: He appears well-developed and well-nourished. He is not diaphoretic.   Intractable hematemesis, A&Ox3. Afebrile, 98% RA. 200/103   HENT:   Head: Normocephalic and atraumatic.   Right Ear: External ear normal.   Left Ear: External ear normal.   Mouth/Throat: Oropharynx is clear and moist.   Eyes: Conjunctivae and EOM are normal. Pupils are equal, round, and reactive to light.   Neck: Normal range of motion.   Cardiovascular: Normal rate, regular rhythm and intact distal pulses.   Murmur (unchanged from prior documentation) heard.  Pulmonary/Chest: Breath sounds normal. No respiratory distress. He has no wheezes.   Abdominal: Soft. There is tenderness (LUQ and epigastrium).   Genitourinary: Rectal exam shows guaiac positive stool. Guaiac positive stool. : Acceptable.  Musculoskeletal: He exhibits no edema.   L BKA with prosthesis in place    Neurological: He is alert and oriented to person, place, and time. No cranial nerve deficit.   Skin: Skin is warm and dry. Capillary refill takes less than 2 seconds. No erythema. No pallor.   Psychiatric: He has a normal mood and affect. His behavior is normal. Thought content normal.         ED Course   Procedures     Imaging Results          X-Ray Chest PA And Lateral (Final result)  Result time 05/21/18 12:41:23    Final result by Lucho Kaur MD (05/21/18 12:41:23)                 Impression:      See above      Electronically signed by: Lucho Kaur MD  Date:    05/21/2018  Time:    12:41             Narrative:    EXAMINATION:  XR CHEST PA AND LATERAL    CLINICAL HISTORY:  Vomiting, unspecified    TECHNIQUE:  PA and lateral views of the chest were performed.    COMPARISON:  N 4 3 2018 one    FINDINGS:  Right subclavian vascular stent as before.  Heart is not enlarged.  Mild accentuation interstitial markings.  No significant airspace consolidation or pleural effusion identified                              Labs Reviewed   COMPREHENSIVE METABOLIC PANEL - Abnormal; Notable for the following:        Result Value    Potassium 2.9 (*)     CO2 30 (*)     Glucose 131 (*)     Creatinine 5.8 (*)     Calcium 8.4 (*)     Albumin 3.1 (*)     Alkaline Phosphatase 142 (*)     ALT 7 (*)     eGFR if  11.8 (*)     eGFR if non  10.2 (*)     All other components within normal limits   CBC W/ AUTO DIFFERENTIAL - Abnormal; Notable for the following:     RBC 2.72 (*)     Hemoglobin 8.7 (*)     Hematocrit 26.5 (*)     MCH 32.0 (*)     Eosinophil% 8.1 (*)     All other components within normal limits   LIPASE   LACTIC ACID, PLASMA   APTT   PROTIME-INR   PROTIME-INR    Narrative:     add on [id number]; [id number] per Kate Merrill PA-C    05/21/2018  12:51    APTT    Narrative:     add on [id number]; [id number] per Kate Merrill PA-C    05/21/2018  12:51    URINALYSIS, REFLEX TO URINE  CULTURE          Medical Decision Making:   Initial Assessment:   Pt presents with recurrent episode of intractable nausea with hematemesis. Known Hx of reflux esophagitis and PUD. Guaiac positive. Not taking prescribed protonix and reglan.  Differential Diagnosis:   DDX bleeding PUD, reflux esophagitis, upper GI bleed. Physical exam and history taking lower clinical suspicion for lower GI bleed, pancreatitis, viral gastroenteritis.  ED Management:  IVF resuscitation initiated, patient is able to void.Vomiting subsided with ODT zofran, IV haldol and IV protonix. Patient reports abdominal pain subsided with medication. H&H stable for chronic anemia, no leukocytosis. Hypokalemic, will give PO potassium repletion. Lipase and lactate unremarkable. I considered but do not suspect infectious etiology of symptoms, likely acute exacerbation of esophagitis and bleeding PUD. Recommend observation admission for management of vomiting, anemia and electrolyte abnormalities. Also recommended to complete unfinished dialysis session as indicated. Patient has scheduled EGD for ulcer sealing June 12.    BP recorded at 199/99 at 1310. Patient has not taken HTN medication (Lisinopril 40) in several days.    Update: Hospitalist requesting GI consult prior to moving to floor.     Kate Merrill PA-C  05/21/2018                        Clinical Impression:   The primary encounter diagnosis was Gastrointestinal hemorrhage, unspecified gastrointestinal hemorrhage type. A diagnosis of Vomiting was also pertinent to this visit.    Disposition:   Disposition: Placed in Observation  Condition: Stable                        Kate Merrill PA-C  05/21/18 2058

## 2018-05-21 NOTE — ASSESSMENT & PLAN NOTE
"Suspect exacerbated by noncompliance with PPI (reports last taking ~1 week ago).  Pt given protonix IV in the ED, will continue IV bid.  Hgb fairly stable and BP elevated upon arrival.  N/V, abdominal pain resolved with antiemetics.  Type & Screen ordered and blood consent signed.  GI notified of admission as pt scheduled for ulcer sealing 6/12/18.  Will not place formal consult on admission as pt stable at this time and hematemesis described as "brown" per patient.  NPO at this time.    "

## 2018-05-21 NOTE — ED NOTES
Upon departure patient was sleeping, respirations even and unlabored, skin dry and warm, and showed no signs or symptoms of acute distress.

## 2018-05-21 NOTE — ASSESSMENT & PLAN NOTE
Elevated to 200/103 on admission and improved to 188/98 with administration of home lisinopril 40 mg daily  Pt only completed 1 hr of HD this morning.  Nephrology consulted.

## 2018-05-21 NOTE — ASSESSMENT & PLAN NOTE
Diet controlled  Last HgbA1c 4.2% (03/2018)  Pt NPO on admission however when able to resume diet will advance to diabetic renal diet  Hyper/hypoglycemia protocols in place

## 2018-05-21 NOTE — PROGRESS NOTES
Notified PRATIK Luis of pt's repeat potassium of 4.0.  Orders given to hold potassium. Orders will be carried out.

## 2018-05-21 NOTE — PLAN OF CARE
Problem: Patient Care Overview  Goal: Plan of Care Review  Outcome: Ongoing (interventions implemented as appropriate)  Pt on fall precautions. Yellow fall risk band and socks on pt. Instructed pt to call for assistance as needed and pt voiced understanding. Sister at bedside. Denies pain or discomfort. Dialysis MWF.

## 2018-05-21 NOTE — H&P
[facility] Hospital Medicine  History & Physical    Patient Name: Vaughn Retana  MRN: [id number]  Admission Date: 5/21/2018  Attending Physician: Lainey Wheat MD   Primary Care Provider: No primary care provider on file.    Hospital Medicine Team: [facility] HOSP MED E Aleja Snow PA-C     Patient information was obtained from patient, past medical records and ER records.     Subjective:     Principal Problem:Chronic upper GI bleeding    Chief Complaint:   Chief Complaint   Patient presents with    Vomiting     dark colored emesis. Sent from dialysis, has 270 cc removed (1 hour dialysis). Reports abdominal pain also.         HPI: 51 y/o M with PMH PUD (EGD scheduled for 6/12/18 for ulcer sealing), reflux esophagitis, T2DM with diabetic kidney disease on HD MWF and h/o L BKA, h/o ICH (suspected due to HTN) presents with hematemesis x 4 days.  Pt reports last episode of vomiting was yesterday.  He reports noncompliance with bid PPI stating he last took ~1 week ago.  +epigastric pain after vomiting.  No fever, chills, diarrhea, SOB, dizziness, lightheadedness.  Of note this is pt's 5th admission since Feb 2018.  Noted last admission pt with AMS and reports from family stated behavior change x 1 year.  Pt stopped drinking alcohol several years ago and denies drug use.    In the ED, pt given ODT zofran, IV haldol and IV protonix which resolved nausea/vomiting and abdominal pain.  Hgb 8.7 (close to baseline).  K 2.9.  Lipase and lactic acid negative.  CXR with right subclavian vascular stent as before.  Heart is not enlarged.  Mild accentuation interstitial markings.  No significant airspace consolidation or pleural effusion identified.  /103 upon arrival (completed 1 hr of HD today and had not taken lisinopril).    Past Medical History:   Diagnosis Date    Amputation stump pain 9/10/2013    Aspiration pneumonia 7/27/2015    Asterixis 11/8/2016    C. difficile colitis 8/7/2015     Cholelithiasis without obstruction 8/25/2015    Chronic diastolic heart failure     2-23-17   1 - Low normal to mildly depressed left ventricular systolic function (EF 50-55%).    2 - Right ventricular enlargement with mildly depressed systolic function.    3 - Left ventricular diastolic dysfunction.    4 - Right atrial enlargement.    5 - Severe tricuspid regurgitation.    6 - Pulmonary hypertension. The estimated PA systolic pressure is 86 mmHg.    7 - Increased central venous pressure.    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 HD Patient MWF, K+ lab prior to procedure.     FOOT AMPUTATION THROUGH METATARSAL      left foot    LEG AMPUTATION THROUGH KNEE  12/18/2013    left BKA    R AVF  9/12/12       Review of patient's allergies indicates:   Allergen Reactions    Fosrenol [lanthanum] Nausea And Vomiting     Nausea and vomiting       No current  facility-administered medications on file prior to encounter.      Current Outpatient Prescriptions on File Prior to Encounter   Medication Sig    lisinopril (PRINIVIL,ZESTRIL) 40 MG tablet Take 1 tablet (40 mg total) by mouth every evening.    ondansetron (ZOFRAN) 8 MG tablet Take 1 tablet (8 mg total) by mouth every 8 (eight) hours as needed for Nausea.    pantoprazole (PROTONIX) 40 MG tablet Take 1 tablet (40 mg total) by mouth 2 (two) times daily before meals.     Family History     Problem Relation (Age of Onset)    Alcohol abuse Maternal Grandmother    Diabetes Brother, Maternal Grandfather    Early death Mother    Heart disease Father    Hyperlipidemia Father    Hypertension Father, Sister    Kidney disease Father        Social History Main Topics    Smoking status: Former Smoker     Packs/day: 1.00     Years: 10.00    Smokeless tobacco: Never Used    Alcohol use No    Drug use: No    Sexual activity: Yes     Partners: Female     Birth control/ protection: None     Review of Systems   Constitutional: Negative for chills, diaphoresis, fatigue, fever and unexpected weight change.   HENT: Negative for drooling, facial swelling, trouble swallowing and voice change.    Eyes: Negative for photophobia, pain, redness and visual disturbance.   Respiratory: Negative for cough, chest tightness, shortness of breath and wheezing.    Cardiovascular: Negative for chest pain, palpitations and leg swelling.   Gastrointestinal: Positive for abdominal pain, nausea and vomiting. Negative for blood in stool, constipation, diarrhea and rectal pain.   Endocrine: Negative for cold intolerance, heat intolerance, polydipsia, polyphagia and polyuria.   Genitourinary: Negative for dysuria, flank pain, frequency and hematuria.   Musculoskeletal: Negative for arthralgias, back pain, myalgias and neck pain.   Skin: Negative for color change, pallor, rash and wound.   Neurological: Negative for dizziness, tremors, syncope, facial  asymmetry, speech difficulty, weakness, light-headedness and headaches.   Hematological: Negative for adenopathy. Does not bruise/bleed easily.   Psychiatric/Behavioral: Positive for decreased concentration. Negative for agitation, dysphoric mood and sleep disturbance. The patient is not nervous/anxious.      Objective:     Vital Signs (Most Recent):  Temp: 97.6 °F (36.4 °C) (05/21/18 1433)  Pulse: 83 (05/21/18 1433)  Resp: 18 (05/21/18 1433)  BP: (!) 188/98 (05/21/18 1433)  SpO2: 100 % (05/21/18 1433) Vital Signs (24h Range):  Temp:  [97.6 °F (36.4 °C)-98.1 °F (36.7 °C)] 97.6 °F (36.4 °C)  Pulse:  [82-92] 83  Resp:  [15-18] 18  SpO2:  [98 %-100 %] 100 %  BP: (188-208)/() 188/98     Weight: 79.8 kg (176 lb)  Body mass index is 28.41 kg/m².    Physical Exam   Constitutional: He is oriented to person, place, and time. He appears well-developed and well-nourished. He appears lethargic.   HENT:   Head: Normocephalic and atraumatic.   Eyes: EOM are normal. Pupils are equal, round, and reactive to light.   Neck: Normal range of motion. Neck supple. No tracheal deviation present. No thyromegaly present.   Cardiovascular: Normal rate and regular rhythm.    No murmur heard.  Pulmonary/Chest: Effort normal and breath sounds normal. He has no wheezes.   Abdominal: Soft. Bowel sounds are normal. There is no tenderness.   Musculoskeletal: Normal range of motion. He exhibits no edema or tenderness.   L BKA   Lymphadenopathy:     He has no cervical adenopathy.   Neurological: He is oriented to person, place, and time. He has normal reflexes. He appears lethargic. No cranial nerve deficit. GCS eye subscore is 4. GCS verbal subscore is 5. GCS motor subscore is 6.   Pt with sheet over his head upon arrival.  Pt falling asleep during interview however when provider leaving the room pt became much more alert and asked when he could eat.  Oriented to person, place, and year (not month/day).   Skin: Skin is warm and dry.  "  Psychiatric: He has a normal mood and affect. His behavior is normal.   Nursing note and vitals reviewed.        CRANIAL NERVES     CN III, IV, VI   Pupils are equal, round, and reactive to light.  Extraocular motions are normal.        Significant Labs: All pertinent labs within the past 24 hours have been reviewed.    Significant Imaging: I have reviewed all pertinent imaging results/findings within the past 24 hours.    Assessment/Plan:     * Chronic upper GI bleeding    Suspect exacerbated by noncompliance with PPI (reports last taking ~1 week ago).  Pt given protonix IV in the ED, will continue IV bid.  Hgb fairly stable and BP elevated upon arrival.  N/V, abdominal pain resolved with antiemetics.  Type & Screen ordered and blood consent signed.  GI notified of admission as pt scheduled for ulcer sealing 6/12/18.  Will not place formal consult on admission as pt stable at this time and hematemesis described as "brown" per patient.  NPO at this time.          ESRD on hemodialysis    Completed 1 hr of HD today  HD MWF, Nephrology consulted  When able to advance diet, will advance to diabetic renal diet            Anemia in chronic kidney disease    Hgb 8.7 on admission (baseline Hgb ranging mid 8 to 9)  Monitor daily          Hypokalemia    2.9 on admission, likely low due to GI losses  Pt given K 40 mEq x 2  Will monitor renal function panel and Mg daily          Renovascular hypertension    Elevated to 200/103 on admission and improved to 188/98 with administration of home lisinopril 40 mg daily  Pt only completed 1 hr of HD this morning.  Nephrology consulted.          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, without long-term current use of insulin    Diet controlled  Last HgbA1c 4.2% (03/2018)  Pt NPO on admission however when able to resume diet will advance to diabetic renal diet  Hyper/hypoglycemia protocols in place          History of Intracerebral Hemorrhage: L BG 5/2013; R BG " 9/2016; R BG 11/2016; L caudate head 2/2017    Last head CT 4/3/2018: multifocal regions of encephalomalacia, suggesting remote lacunar infarcts noting encephalomalacia in the region of previous left intraparenchymal/thalamic hemorrhage.  Reviewed most recent admission in which pt admitted for AMS.  It was documented that family has been noticing behavior changes for ~1 year.  Pt denies drug/alcohol, urine tox ordered.  Low threshold to order CT head.  Neuro checks q 4 hrs.  Pt falling asleep during interview and upon entering room pt had blanket over his head.  When provider was leaving the room, pt became much more alert asking when he would be able to eat.        History of left below knee amputation 12/18/13    No signs of infection  Believed due to gangrene infection            VTE Risk Mitigation         Ordered     IP VTE HIGH RISK PATIENT  Once      05/21/18 1307     Place sequential compression device  Until discontinued      05/21/18 1307             Aleja Snow PA-C  Department of Hospital Medicine   [facility]

## 2018-05-21 NOTE — NURSING
Received pt to floor from ED via stretcher accompanied by nurse.  Lethargic however easily arouses. Denies pain or discomfort. Respirations even and unlabored. No distress noted. Pt stable. Pt oriented to room and call bell placed within reach

## 2018-05-21 NOTE — CONSULTS
REASON FOR CONSULT: End-stage renal disease management    HISTORY OF PRESENT ILLNESS: 52 y.o. male with a history of  has a past medical history of Amputation stump pain (9/10/2013); Aspiration pneumonia (7/27/2015); Asterixis (11/8/2016); C. 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 presents for had concerns including Vomiting (dark colored emesis. Sent from dialysis, has 270 cc removed (1 hour dialysis). Reports abdominal pain also. ). on 5/21/2018.  He is currently on maintenance dialysis Monday Wednesday Friday, he had dialysis this morning for approximately an hour and half.  He currently denies any hematemesis or any nausea or vomiting.    ROS:  Review of Systems - General ROS: negative for - chills, fatigue, fever, night sweats, weight gain or weight loss  ENT ROS: positive for - epistaxis, headaches, hearing change, nasal congestion, nasal discharge, nasal polyps, oral lesions, sinus pain, sneezing, sore throat, tinnitus and vertigo  Hematological and Lymphatic ROS: negative for - bleeding problems, blood clots, bruising, fatigue, jaundice, night sweats or swollen lymph nodes  Endocrine ROS: negative for -  breast changes, galactorrhea, hair pattern changes, hot flashes, mood swings, palpitations, polydipsia/polyuria, skin changes or temperature intolerance  Respiratory ROS: no cough, shortness of breath, or wheezing  negative for - hemoptysis, pleuritic pain, sputum changes or stridor  Cardiovascular ROS: no chest pain or dyspnea on exertion  negative for - edema, irregular heartbeat, loss of consciousness, orthopnea, palpitations, paroxysmal nocturnal dyspnea or shortness of breath  Gastrointestinal ROS: no abdominal pain, change in bowel habits, or black or bloody stools  negative for - abdominal pain, appetite loss, change in bowel habits, change in stools, gas/bloating, heartburn, nausea/vomiting, stool incontinence or swallowing difficulty/pain  Genito-Urinary ROS: no dysuria, trouble voiding, or hematuria  Musculoskeletal ROS: negative for - joint pain, joint stiffness, joint swelling, muscle pain or muscular weakness  Neurological ROS: no TIA or stroke symptoms  negative for - confusion, headaches, impaired coordination/balance, memory loss or visual changes  Dermatological ROS: negative for acne, dry skin, eczema, hair changes, lumps, mole changes and nail changes    PAST MEDICAL HISTORY:  Past Medical History:   Diagnosis Date    Amputation stump pain 9/10/2013    Aspiration pneumonia 7/27/2015    Asterixis 11/8/2016    C. difficile colitis 8/7/2015    Cholelithiasis without obstruction 8/25/2015    Chronic diastolic heart failure     2-23-17   1 - Low normal to mildly depressed left ventricular systolic function (EF 50-55%).    2 - Right ventricular enlargement with mildly depressed systolic function.    3 - Left ventricular diastolic dysfunction.    4 - Right atrial enlargement.    5 - Severe tricuspid regurgitation.    6 - Pulmonary hypertension. The estimated PA systolic pressure is 86 mmHg.    7 - Increased central venous pressure.    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    Metabolic acidosis, IAG, reduced excretion of inorganic acids     Myoclonic jerking 9/20/2016    Secondary hyperparathyroidism (of renal origin)     Secondary pulmonary hypertension 3/23/2017    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    TB lung, latent 08/25/2015    Negative Quantiferon Gold 3-23-17       PAST SURGICAL HISTORY:  Past Surgical History:   Procedure Laterality Date    COLONOSCOPY      COLONOSCOPY N/A 4/4/2017    Procedure: COLONOSCOPY;  Surgeon: Walker Stern MD;  Location: [facility] ([address] [place] [address]);  Service: Endoscopy;  Laterality: N/A;  PA Systolic Pressure 85.56. HD Patient MWF, K+ lab prior to procedure.     FOOT AMPUTATION THROUGH METATARSAL      left foot    LEG AMPUTATION THROUGH KNEE  12/18/2013    left BKA    R AVF  9/12/12       FAMILY HISTORY:   Family History   Problem Relation Age of Onset    Early death Mother     Kidney disease Father     Hypertension Father     Heart disease Father     Hyperlipidemia Father     Diabetes Brother     Alcohol abuse Maternal Grandmother     Diabetes Maternal Grandfather     Hypertension Sister     Melanoma Neg Hx        SOCIAL HISTORY:  Social History     Social History    Marital status:      Spouse name: N/A    Number of children: N/A    Years of education: N/A     Occupational History    Not on file.     Social History Main Topics    Smoking status: Former Smoker     Packs/day: 1.00     Years: 10.00    Smokeless tobacco: Never Used    Alcohol  "use No    Drug use: No    Sexual activity: Yes     Partners: Female     Birth control/ protection: None     Other Topics Concern    Not on file     Social History Narrative    No narrative on file       ALLERGIES:  Review of patient's allergies indicates:   Allergen Reactions    Fosrenol [lanthanum] Nausea And Vomiting     Nausea and vomiting       MEDICATIONS:Scheduled Meds:   [START ON 5/22/2018] lisinopril  40 mg Oral Daily    pantoprazole 40 mg in dextrose 5 % 100 mL infusion (ready to mix system)  40 mg Intravenous BID    potassium chloride  40 mEq Oral Once     Continuous Infusions:  PRN Meds:.acetaminophen, dextrose 50%, glucagon (human recombinant), insulin aspart U-100, ondansetron, polyethylene glycol, promethazine, ramelteon, sodium chloride 0.9%    PHYSICAL EXAM:  BP (!) 188/98 (BP Location: Left arm, Patient Position: Lying)   Pulse 83   Temp 97.6 °F (36.4 °C) (Oral)   Resp 18   Ht 5' 6" (1.676 m)   Wt 78.6 kg (173 lb 3 oz)   SpO2 100%   BMI 27.95 kg/m²   General appearance: alert, appears stated age and cooperative  Head: Normocephalic, without obvious abnormality, atraumatic  Eyes: conjunctivae/corneas clear. PERRL, EOM's intact. Fundi benign.  Nose: Nares normal. Septum midline. Mucosa normal. No drainage or sinus tenderness.  Throat: lips, mucosa, and tongue normal; teeth and gums normal  Neck: no adenopathy, no carotid bruit, no JVD, supple, symmetrical, trachea midline and thyroid not enlarged, symmetric, no tenderness/mass/nodules  Lungs: clear to auscultation bilaterally  Heart: regular rate and rhythm, S1, S2 normal, no murmur, click, rub or gallop  Abdomen: soft, non-tender; bowel sounds normal; no masses,  no organomegaly  Extremities: extremities normal, atraumatic, no cyanosis or edema  Pulses: 2+ and symmetric  Skin: Skin color, texture, turgor normal. No rashes or lesions  Neurologic:   Right AVF; Good thrill.    INPUT/OUTPUT  No intake/output data " recorded.  -----------------------------------  I/O this shift:  In: 1100 [I.V.:100; IV Piggyback:1000]  Out: -       LABS:  Recent Results (from the past 24 hour(s))   Lipase    Collection Time: 05/21/18 11:31 AM   Result Value Ref Range    Lipase 31 4 - 60 U/L   Lactic acid, plasma    Collection Time: 05/21/18 11:31 AM   Result Value Ref Range    Lactate (Lactic Acid) 1.0 0.5 - 2.2 mmol/L   Comprehensive metabolic panel    Collection Time: 05/21/18 11:31 AM   Result Value Ref Range    Sodium 139 136 - 145 mmol/L    Potassium 2.9 (L) 3.5 - 5.1 mmol/L    Chloride 99 95 - 110 mmol/L    CO2 30 (H) 23 - 29 mmol/L    Glucose 131 (H) 70 - 110 mg/dL    BUN, Bld 10 6 - 20 mg/dL    Creatinine 5.8 (H) 0.5 - 1.4 mg/dL    Calcium 8.4 (L) 8.7 - 10.5 mg/dL    Total Protein 7.9 6.0 - 8.4 g/dL    Albumin 3.1 (L) 3.5 - 5.2 g/dL    Total Bilirubin 0.4 0.1 - 1.0 mg/dL    Alkaline Phosphatase 142 (H) 55 - 135 U/L    AST 20 10 - 40 U/L    ALT 7 (L) 10 - 44 U/L    Anion Gap 10 8 - 16 mmol/L    eGFR if African American 11.8 (A) >60 mL/min/1.73 m^2    eGFR if non African American 10.2 (A) >60 mL/min/1.73 m^2   CBC auto differential    Collection Time: 05/21/18 11:31 AM   Result Value Ref Range    WBC 6.26 3.90 - 12.70 K/uL    RBC 2.72 (L) 4.60 - 6.20 M/uL    Hemoglobin 8.7 (L) 14.0 - 18.0 g/dL    Hematocrit 26.5 (L) 40.0 - 54.0 %    MCV 97 82 - 98 fL    MCH 32.0 (H) 27.0 - 31.0 pg    MCHC 32.8 32.0 - 36.0 g/dL    RDW 13.5 11.5 - 14.5 %    Platelets 247 150 - 350 K/uL    MPV 10.7 9.2 - 12.9 fL    Immature Granulocytes 0.3 0.0 - 0.5 %    Gran # (ANC) 3.9 1.8 - 7.7 K/uL    Immature Grans (Abs) 0.02 0.00 - 0.04 K/uL    Lymph # 1.2 1.0 - 4.8 K/uL    Mono # 0.6 0.3 - 1.0 K/uL    Eos # 0.5 0.0 - 0.5 K/uL    Baso # 0.02 0.00 - 0.20 K/uL    nRBC 0 0 /100 WBC    Gran% 62.6 38.0 - 73.0 %    Lymph% 19.0 18.0 - 48.0 %    Mono% 9.7 4.0 - 15.0 %    Eosinophil% 8.1 (H) 0.0 - 8.0 %    Basophil% 0.3 0.0 - 1.9 %    Differential Method Automated     Protime-INR    Collection Time: 05/21/18 11:31 AM   Result Value Ref Range    Prothrombin Time 10.5 9.0 - 12.5 sec    INR 1.0 0.8 - 1.2   APTT    Collection Time: 05/21/18 11:31 AM   Result Value Ref Range    aPTT 24.0 21.0 - 32.0 sec         ASSESSMENT:  Patient Active Problem List   Diagnosis    Anemia in chronic kidney disease    Secondary hyperparathyroidism of renal origin    Dyslipidemia    ESRD on hemodialysis    Peripheral vascular disease in diabetes mellitus    History of left below knee amputation 12/18/13    Acute encephalopathy    SIRS (systemic inflammatory response syndrome)    Singultus    Hypocalcemia    History of Intracerebral Hemorrhage: L BG 5/2013; R BG 9/2016; R BG 11/2016; L caudate head 2/2017    Secondary pulmonary hypertension    Reflux esophagitis    DVT (deep venous thrombosis)    Chronic kidney disease-mineral and bone disorder    Hypokalemia    Renovascular hypertension    Chronic upper GI bleeding    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, without long-term current use of insulin       PLAN:  1.  End-stage renal disease; on Monday Wednesday Friday schedule, we will do hemodialysis on Wednesday.  Continue supportive care.  He received 1 dose of potassium supplements  2.  Nausea and vomiting this morning likely multifactorial in etiology, gastroenterology is consulted for management of possible upper GI bleed.  3.  Hypertension; continue his lisinopril.  4.  Mineral bone disease; we will obtain care plan and start him VDRA.

## 2018-05-21 NOTE — SUBJECTIVE & OBJECTIVE
Past Medical History:   Diagnosis Date    Amputation stump pain 9/10/2013    Aspiration pneumonia 7/27/2015    Asterixis 11/8/2016    C. difficile colitis 8/7/2015    Cholelithiasis without obstruction 8/25/2015    Chronic diastolic heart failure     2-23-17   1 - Low normal to mildly depressed left ventricular systolic function (EF 50-55%).    2 - Right ventricular enlargement with mildly depressed systolic function.    3 - Left ventricular diastolic dysfunction.    4 - Right atrial enlargement.    5 - Severe tricuspid regurgitation.    6 - Pulmonary hypertension. The estimated PA systolic pressure is 86 mmHg.    7 - Increased central venous pressure.     Chronic low back pain 12/1/2015    Closed head injury 9/8/2016    ESRD on hemodialysis 2/7/2013    MWF at 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    Metabolic acidosis, IAG, reduced excretion of inorganic acids     Myoclonic jerking 9/20/2016    Secondary hyperparathyroidism (of renal origin)     Secondary pulmonary hypertension 3/23/2017    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    TB lung, latent 08/25/2015    Negative Quantiferon Gold 3-23-17       Past Surgical History:   Procedure Laterality Date    COLONOSCOPY      COLONOSCOPY N/A 4/4/2017    Procedure: COLONOSCOPY;  Surgeon: Walker Stern MD;  Location: [facility] ([address] [place] [address]);  Service: Endoscopy;  Laterality: N/A;  PA Systolic Pressure 85.56. HD Patient MWF, K+ lab prior to procedure.     FOOT AMPUTATION THROUGH METATARSAL      left foot    LEG AMPUTATION THROUGH KNEE  12/18/2013    left BKA    R AVF   9/12/12       Review of patient's allergies indicates:   Allergen Reactions    Fosrenol [lanthanum] Nausea And Vomiting     Nausea and vomiting       No current facility-administered medications on file prior to encounter.      Current Outpatient Prescriptions on File Prior to Encounter   Medication Sig    lisinopril (PRINIVIL,ZESTRIL) 40 MG tablet Take 1 tablet (40 mg total) by mouth every evening.    ondansetron (ZOFRAN) 8 MG tablet Take 1 tablet (8 mg total) by mouth every 8 (eight) hours as needed for Nausea.    pantoprazole (PROTONIX) 40 MG tablet Take 1 tablet (40 mg total) by mouth 2 (two) times daily before meals.     Family History     Problem Relation (Age of Onset)    Alcohol abuse Maternal Grandmother    Diabetes Brother, Maternal Grandfather    Early death Mother    Heart disease Father    Hyperlipidemia Father    Hypertension Father, Sister    Kidney disease Father        Social History Main Topics    Smoking status: Former Smoker     Packs/day: 1.00     Years: 10.00    Smokeless tobacco: Never Used    Alcohol use No    Drug use: No    Sexual activity: Yes     Partners: Female     Birth control/ protection: None     Review of Systems   Constitutional: Negative for chills, diaphoresis, fatigue, fever and unexpected weight change.   HENT: Negative for drooling, facial swelling, trouble swallowing and voice change.    Eyes: Negative for photophobia, pain, redness and visual disturbance.   Respiratory: Negative for cough, chest tightness, shortness of breath and wheezing.    Cardiovascular: Negative for chest pain, palpitations and leg swelling.   Gastrointestinal: Positive for abdominal pain, nausea and vomiting. Negative for blood in stool, constipation, diarrhea and rectal pain.   Endocrine: Negative for cold intolerance, heat intolerance, polydipsia, polyphagia and polyuria.   Genitourinary: Negative for dysuria, flank pain, frequency and hematuria.   Musculoskeletal: Negative for arthralgias,  back pain, myalgias and neck pain.   Skin: Negative for color change, pallor, rash and wound.   Neurological: Negative for dizziness, tremors, syncope, facial asymmetry, speech difficulty, weakness, light-headedness and headaches.   Hematological: Negative for adenopathy. Does not bruise/bleed easily.   Psychiatric/Behavioral: Positive for decreased concentration. Negative for agitation, dysphoric mood and sleep disturbance. The patient is not nervous/anxious.      Objective:     Vital Signs (Most Recent):  Temp: 97.6 °F (36.4 °C) (05/21/18 1433)  Pulse: 83 (05/21/18 1433)  Resp: 18 (05/21/18 1433)  BP: (!) 188/98 (05/21/18 1433)  SpO2: 100 % (05/21/18 1433) Vital Signs (24h Range):  Temp:  [97.6 °F (36.4 °C)-98.1 °F (36.7 °C)] 97.6 °F (36.4 °C)  Pulse:  [82-92] 83  Resp:  [15-18] 18  SpO2:  [98 %-100 %] 100 %  BP: (188-208)/() 188/98     Weight: 79.8 kg (176 lb)  Body mass index is 28.41 kg/m².    Physical Exam   Constitutional: He is oriented to person, place, and time. He appears well-developed and well-nourished. He appears lethargic.   HENT:   Head: Normocephalic and atraumatic.   Eyes: EOM are normal. Pupils are equal, round, and reactive to light.   Neck: Normal range of motion. Neck supple. No tracheal deviation present. No thyromegaly present.   Cardiovascular: Normal rate and regular rhythm.    No murmur heard.  Pulmonary/Chest: Effort normal and breath sounds normal. He has no wheezes.   Abdominal: Soft. Bowel sounds are normal. There is no tenderness.   Musculoskeletal: Normal range of motion. He exhibits no edema or tenderness.   L BKA   Lymphadenopathy:     He has no cervical adenopathy.   Neurological: He is oriented to person, place, and time. He has normal reflexes. He appears lethargic. No cranial nerve deficit. GCS eye subscore is 4. GCS verbal subscore is 5. GCS motor subscore is 6.   Pt with sheet over his head upon arrival.  Pt falling asleep during interview however when provider leaving  the room pt became much more alert and asked when he could eat.  Oriented to person, place, and year (not month/day).   Skin: Skin is warm and dry.   Psychiatric: He has a normal mood and affect. His behavior is normal.   Nursing note and vitals reviewed.        CRANIAL NERVES     CN III, IV, VI   Pupils are equal, round, and reactive to light.  Extraocular motions are normal.        Significant Labs: All pertinent labs within the past 24 hours have been reviewed.    Significant Imaging: I have reviewed all pertinent imaging results/findings within the past 24 hours.

## 2018-05-21 NOTE — ASSESSMENT & PLAN NOTE
2.9 on admission, likely low due to GI losses  Pt given K 40 mEq x 2  Will monitor renal function panel and Mg daily

## 2018-05-21 NOTE — ED TRIAGE NOTES
Pt only got a hour into his run of dialysis, interrupted by nausea and vomiting. No fevers no diarrhea.  States hes been throwing up for two days. Pain located at left side of abdomen, painful to touch.  Patient actively throwing up still.

## 2018-05-21 NOTE — ED NOTES
Patient identifiers for Vaughn Retana 52 y.o. male checked and correct.  Chief Complaint   Patient presents with    Vomiting     dark colored emesis. Sent from dialysis, has 270 cc removed (1 hour dialysis). Reports abdominal pain also.      Past Medical History:   Diagnosis Date    Amputation stump pain 9/10/2013    Aspiration pneumonia 7/27/2015    Asterixis 11/8/2016    C. difficile colitis 8/7/2015    Cholelithiasis without obstruction 8/25/2015    Chronic diastolic heart failure     2-23-17   1 - Low normal to mildly depressed left ventricular systolic function (EF 50-55%).    2 - Right ventricular enlargement with mildly depressed systolic function.    3 - Left ventricular diastolic dysfunction.    4 - Right atrial enlargement.    5 - Severe tricuspid regurgitation.    6 - Pulmonary hypertension. The estimated PA systolic pressure is 86 mmHg.    7 - Increased central venous pressure.    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 Patient is oriented x 3 and speaking appropriately.  APPEARANCE: Patient resting comfortably and in no acute distress. Patient is clean and well groomed, patient's clothing is properly fastened.  HEENT: No abnormalities noted  SKIN: The skin is warm and dry. Patient has normal skin turgor and moist mucus membranes. Skin is intact; no bruising or breakdown noted.  MUSKULOSKELETAL: Patient is moving all extremities well(BKA on left side), no obvious deformities noted. Pulses intact.   RESPIRATORY: Airway is open and patent. Respirations are spontaneous and non-labored with normal effort and rate, BBS=clear  CARDIAC: Patient has a normal rate and rhythm. Normal sinus on cardiac monitor,No peripheral edema noted.   ABDOMEN: No distention noted. Bowel sounds active in all 4 quadrants. Soft but tender with palpation.

## 2018-05-21 NOTE — ASSESSMENT & PLAN NOTE
Completed 1 hr of HD today  HD MWF, Nephrology consulted  When able to advance diet, will advance to diabetic renal diet

## 2018-05-21 NOTE — PROGRESS NOTES
Notified PRATIK Luis of patient receiving 40 meq of potassium in the ED and that pt has new orders for another dose of potassium however the patient is a dialysis patient.  PA stated that she will get a repeat potassium prior to second dose of potassium being given.

## 2018-05-22 ENCOUNTER — TELEPHONE (OUTPATIENT)
Dept: GASTROENTEROLOGY | Facility: HOSPITAL | Age: 53
End: 2018-05-22

## 2018-05-22 ENCOUNTER — ANESTHESIA (OUTPATIENT)
Dept: ENDOSCOPY | Facility: HOSPITAL | Age: 53
DRG: 377 | End: 2018-05-22
Payer: MEDICARE

## 2018-05-22 DIAGNOSIS — K20.90 ESOPHAGITIS: Primary | ICD-10-CM

## 2018-05-22 PROBLEM — K92.2 GASTROINTESTINAL HEMORRHAGE: Status: ACTIVE | Noted: 2018-05-22

## 2018-05-22 LAB
ALBUMIN SERPL BCP-MCNC: 2.4 G/DL
ANION GAP SERPL CALC-SCNC: 9 MMOL/L
ANION GAP SERPL CALC-SCNC: 9 MMOL/L
BASOPHILS # BLD AUTO: 0.01 K/UL
BASOPHILS NFR BLD: 0.2 %
BUN SERPL-MCNC: 11 MG/DL
BUN SERPL-MCNC: 11 MG/DL
CALCIUM SERPL-MCNC: 7.3 MG/DL
CALCIUM SERPL-MCNC: 7.7 MG/DL
CHLORIDE SERPL-SCNC: 105 MMOL/L
CHLORIDE SERPL-SCNC: 106 MMOL/L
CO2 SERPL-SCNC: 26 MMOL/L
CO2 SERPL-SCNC: 29 MMOL/L
CREAT SERPL-MCNC: 7.2 MG/DL
CREAT SERPL-MCNC: 7.8 MG/DL
DIFFERENTIAL METHOD: ABNORMAL
EOSINOPHIL # BLD AUTO: 0.5 K/UL
EOSINOPHIL NFR BLD: 11.3 %
ERYTHROCYTE [DISTWIDTH] IN BLOOD BY AUTOMATED COUNT: 13.5 %
EST. GFR  (AFRICAN AMERICAN): 8.3 ML/MIN/1.73 M^2
EST. GFR  (AFRICAN AMERICAN): 9.1 ML/MIN/1.73 M^2
EST. GFR  (NON AFRICAN AMERICAN): 7.1 ML/MIN/1.73 M^2
EST. GFR  (NON AFRICAN AMERICAN): 7.9 ML/MIN/1.73 M^2
ESTIMATED AVG GLUCOSE: ABNORMAL MG/DL
GLUCOSE SERPL-MCNC: 76 MG/DL
GLUCOSE SERPL-MCNC: 98 MG/DL
HBA1C MFR BLD HPLC: <4 %
HCT VFR BLD AUTO: 21.3 %
HCT VFR BLD AUTO: 24.5 %
HGB BLD-MCNC: 7 G/DL
HGB BLD-MCNC: 8 G/DL
IMM GRANULOCYTES # BLD AUTO: 0.01 K/UL
IMM GRANULOCYTES NFR BLD AUTO: 0.2 %
LYMPHOCYTES # BLD AUTO: 1.2 K/UL
LYMPHOCYTES NFR BLD: 26.7 %
MAGNESIUM SERPL-MCNC: 1.9 MG/DL
MCH RBC QN AUTO: 32.1 PG
MCHC RBC AUTO-ENTMCNC: 32.9 G/DL
MCV RBC AUTO: 98 FL
MONOCYTES # BLD AUTO: 0.4 K/UL
MONOCYTES NFR BLD: 9.1 %
NEUTROPHILS # BLD AUTO: 2.4 K/UL
NEUTROPHILS NFR BLD: 52.5 %
NRBC BLD-RTO: 0 /100 WBC
PHOSPHATE SERPL-MCNC: 3.1 MG/DL
PLATELET # BLD AUTO: 216 K/UL
PMV BLD AUTO: 10.8 FL
POCT GLUCOSE: 76 MG/DL (ref 70–110)
POCT GLUCOSE: 82 MG/DL (ref 70–110)
POCT GLUCOSE: 83 MG/DL (ref 70–110)
POCT GLUCOSE: 93 MG/DL (ref 70–110)
POTASSIUM SERPL-SCNC: 3.1 MMOL/L
POTASSIUM SERPL-SCNC: 3.8 MMOL/L
RBC # BLD AUTO: 2.18 M/UL
SODIUM SERPL-SCNC: 140 MMOL/L
SODIUM SERPL-SCNC: 144 MMOL/L
WBC # BLD AUTO: 4.6 K/UL

## 2018-05-22 PROCEDURE — 11000001 HC ACUTE MED/SURG PRIVATE ROOM

## 2018-05-22 PROCEDURE — 37000009 HC ANESTHESIA EA ADD 15 MINS: Performed by: INTERNAL MEDICINE

## 2018-05-22 PROCEDURE — 25000003 PHARM REV CODE 250: Performed by: NURSE ANESTHETIST, CERTIFIED REGISTERED

## 2018-05-22 PROCEDURE — 25000003 PHARM REV CODE 250: Performed by: PHYSICIAN ASSISTANT

## 2018-05-22 PROCEDURE — 80069 RENAL FUNCTION PANEL: CPT

## 2018-05-22 PROCEDURE — 43235 EGD DIAGNOSTIC BRUSH WASH: CPT | Performed by: INTERNAL MEDICINE

## 2018-05-22 PROCEDURE — 36415 COLL VENOUS BLD VENIPUNCTURE: CPT

## 2018-05-22 PROCEDURE — 63600175 PHARM REV CODE 636 W HCPCS: Performed by: PHYSICIAN ASSISTANT

## 2018-05-22 PROCEDURE — 83735 ASSAY OF MAGNESIUM: CPT

## 2018-05-22 PROCEDURE — D9220A PRA ANESTHESIA: Mod: ANES,,, | Performed by: ANESTHESIOLOGY

## 2018-05-22 PROCEDURE — 63600175 PHARM REV CODE 636 W HCPCS: Performed by: NURSE ANESTHETIST, CERTIFIED REGISTERED

## 2018-05-22 PROCEDURE — 0DJ08ZZ INSPECTION OF UPPER INTESTINAL TRACT, VIA NATURAL OR ARTIFICIAL OPENING ENDOSCOPIC: ICD-10-PCS | Performed by: INTERNAL MEDICINE

## 2018-05-22 PROCEDURE — 85025 COMPLETE CBC W/AUTO DIFF WBC: CPT

## 2018-05-22 PROCEDURE — 99232 SBSQ HOSP IP/OBS MODERATE 35: CPT | Mod: ,,, | Performed by: PHYSICIAN ASSISTANT

## 2018-05-22 PROCEDURE — 43235 EGD DIAGNOSTIC BRUSH WASH: CPT | Mod: ,,, | Performed by: INTERNAL MEDICINE

## 2018-05-22 PROCEDURE — 83036 HEMOGLOBIN GLYCOSYLATED A1C: CPT

## 2018-05-22 PROCEDURE — 85014 HEMATOCRIT: CPT

## 2018-05-22 PROCEDURE — 85018 HEMOGLOBIN: CPT

## 2018-05-22 PROCEDURE — 82962 GLUCOSE BLOOD TEST: CPT | Performed by: INTERNAL MEDICINE

## 2018-05-22 PROCEDURE — D9220A PRA ANESTHESIA: Mod: CRNA,,, | Performed by: NURSE ANESTHETIST, CERTIFIED REGISTERED

## 2018-05-22 PROCEDURE — C9113 INJ PANTOPRAZOLE SODIUM, VIA: HCPCS | Performed by: PHYSICIAN ASSISTANT

## 2018-05-22 PROCEDURE — 80048 BASIC METABOLIC PNL TOTAL CA: CPT

## 2018-05-22 PROCEDURE — 37000008 HC ANESTHESIA 1ST 15 MINUTES: Performed by: INTERNAL MEDICINE

## 2018-05-22 RX ORDER — PROPOFOL 10 MG/ML
VIAL (ML) INTRAVENOUS CONTINUOUS PRN
Status: DISCONTINUED | OUTPATIENT
Start: 2018-05-22 | End: 2018-05-22

## 2018-05-22 RX ORDER — KETAMINE HCL IN 0.9 % NACL 50 MG/5 ML
SYRINGE (ML) INTRAVENOUS
Status: DISCONTINUED | OUTPATIENT
Start: 2018-05-22 | End: 2018-05-22

## 2018-05-22 RX ORDER — SODIUM CHLORIDE 9 MG/ML
INJECTION, SOLUTION INTRAVENOUS CONTINUOUS PRN
Status: DISCONTINUED | OUTPATIENT
Start: 2018-05-22 | End: 2018-05-22

## 2018-05-22 RX ORDER — PROPOFOL 10 MG/ML
VIAL (ML) INTRAVENOUS
Status: DISCONTINUED | OUTPATIENT
Start: 2018-05-22 | End: 2018-05-22

## 2018-05-22 RX ORDER — LIDOCAINE HCL/PF 100 MG/5ML
SYRINGE (ML) INTRAVENOUS
Status: DISCONTINUED | OUTPATIENT
Start: 2018-05-22 | End: 2018-05-22

## 2018-05-22 RX ORDER — PANTOPRAZOLE SODIUM 40 MG/1
40 TABLET, DELAYED RELEASE ORAL
Status: DISCONTINUED | OUTPATIENT
Start: 2018-05-22 | End: 2018-05-23 | Stop reason: HOSPADM

## 2018-05-22 RX ORDER — POTASSIUM CHLORIDE 20 MEQ/1
40 TABLET, EXTENDED RELEASE ORAL 2 TIMES DAILY
Status: DISCONTINUED | OUTPATIENT
Start: 2018-05-22 | End: 2018-05-22

## 2018-05-22 RX ADMIN — PROPOFOL 40 MG: 10 INJECTION, EMULSION INTRAVENOUS at 09:05

## 2018-05-22 RX ADMIN — ONDANSETRON 8 MG: 8 TABLET, ORALLY DISINTEGRATING ORAL at 05:05

## 2018-05-22 RX ADMIN — LISINOPRIL 40 MG: 20 TABLET ORAL at 11:05

## 2018-05-22 RX ADMIN — SODIUM CHLORIDE: 0.9 INJECTION, SOLUTION INTRAVENOUS at 09:05

## 2018-05-22 RX ADMIN — LIDOCAINE HYDROCHLORIDE 100 MG: 20 INJECTION, SOLUTION INTRAVENOUS at 09:05

## 2018-05-22 RX ADMIN — DEXTROSE 40 MG: 50 INJECTION, SOLUTION INTRAVENOUS at 11:05

## 2018-05-22 RX ADMIN — PANTOPRAZOLE SODIUM 40 MG: 40 TABLET, DELAYED RELEASE ORAL at 09:05

## 2018-05-22 RX ADMIN — PROPOFOL 150 MCG/KG/MIN: 10 INJECTION, EMULSION INTRAVENOUS at 09:05

## 2018-05-22 RX ADMIN — Medication 20 MG: at 09:05

## 2018-05-22 NOTE — ASSESSMENT & PLAN NOTE
Improved, 3.8 today  2.9 on admission, likely low due to GI losses  Pt given K 40 mEq x 2  Will monitor renal function panel and Mg daily

## 2018-05-22 NOTE — ANESTHESIA RELEASE NOTE
Anesthesia Release from PACU Note    Patient: Vaughn Retana    Procedure(s) Performed: Procedure(s) (LRB):  ESOPHAGOGASTRODUODENOSCOPY (EGD) (N/A)    Anesthesia type: General    Post pain: Adequate analgesia    Post assessment: no apparent anesthetic complications    Last Vitals:   Vitals:    05/22/18 0945   BP: (!) 161/67   Pulse: 94   Resp: 18   Temp:    SpO2: 100%       Post vital signs: stable    Level of consciousness: awake    Complications: none    Airway Patency: patent    Respiratory: spontaneous    Cardiovascular: stable    Hydration: euvolemic

## 2018-05-22 NOTE — SUBJECTIVE & OBJECTIVE
Past Medical History:   Diagnosis Date    Amputation stump pain 9/10/2013    Aspiration pneumonia 7/27/2015    Asterixis 11/8/2016    C. difficile colitis 8/7/2015    Cholelithiasis without obstruction 8/25/2015    Chronic diastolic heart failure     2-23-17   1 - Low normal to mildly depressed left ventricular systolic function (EF 50-55%).    2 - Right ventricular enlargement with mildly depressed systolic function.    3 - Left ventricular diastolic dysfunction.    4 - Right atrial enlargement.    5 - Severe tricuspid regurgitation.    6 - Pulmonary hypertension. The estimated PA systolic pressure is 86 mmHg.    7 - Increased central venous pressure.    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    Metabolic acidosis, IAG, reduced excretion of inorganic acids     Myoclonic jerking 9/20/2016    Secondary hyperparathyroidism (of renal origin)     Secondary pulmonary hypertension 3/23/2017    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    TB lung, latent 08/25/2015    Negative Quantiferon Gold 3-23-17       Past Surgical History:   Procedure Laterality Date    COLONOSCOPY      COLONOSCOPY N/A 4/4/2017    Procedure: COLONOSCOPY;  Surgeon: Walker Stern MD;  Location: [facility] ([address] [place] [address]);  Service: Endoscopy;  Laterality: N/A;  PA Systolic Pressure 85.56. HD Patient MWF, K+ lab prior to procedure.     FOOT AMPUTATION THROUGH METATARSAL      left foot    LEG AMPUTATION THROUGH KNEE  12/18/2013    left BKA    R AVF   9/12/12       Review of patient's allergies indicates:   Allergen Reactions    Fosrenol [lanthanum] Nausea And Vomiting     Nausea and vomiting     Family History     Problem Relation (Age of Onset)    Alcohol abuse Maternal Grandmother    Diabetes Brother, Maternal Grandfather    Early death Mother    Heart disease Father    Hyperlipidemia Father    Hypertension Father, Sister    Kidney disease Father        Social History Main Topics    Smoking status: Former Smoker     Packs/day: 1.00     Years: 10.00    Smokeless tobacco: Never Used    Alcohol use No    Drug use: No    Sexual activity: Yes     Partners: Female     Birth control/ protection: None     Review of Systems   Constitutional: Negative for activity change, appetite change, chills, diaphoresis, fatigue and fever.   HENT: Negative for trouble swallowing.    Respiratory: Negative.    Cardiovascular: Negative.    Gastrointestinal: Positive for nausea. Negative for abdominal distention, abdominal pain, anal bleeding, blood in stool, constipation, diarrhea, rectal pain and vomiting.   Genitourinary: Negative.    Neurological: Negative.      Objective:     Vital Signs (Most Recent):  Temp: 98.2 °F (36.8 °C) (05/21/18 1947)  Pulse: 84 (05/21/18 1947)  Resp: 18 (05/21/18 1947)  BP: (!) 163/84 (05/21/18 1947)  SpO2: 100 % (05/21/18 1947) Vital Signs (24h Range):  Temp:  [97.6 °F (36.4 °C)-98.5 °F (36.9 °C)] 98.2 °F (36.8 °C)  Pulse:  [82-92] 84  Resp:  [15-18] 18  SpO2:  [98 %-100 %] 100 %  BP: (163-208)/() 163/84     Weight: 78.6 kg (173 lb 3 oz) (05/21/18 1534)  Body mass index is 27.95 kg/m².      Intake/Output Summary (Last 24 hours) at 05/21/18 2012  Last data filed at 05/21/18 1320   Gross per 24 hour   Intake             1100 ml   Output                0 ml   Net             1100 ml       Lines/Drains/Airways     Drain                 Hemodialysis AV Fistula Right upper arm -- days         Hemodialysis AV Fistula 03/08/18 1522 Right upper arm 74  days          Peripheral Intravenous Line                 Peripheral IV - Single Lumen 05/21/18 1130 Left Antecubital less than 1 day                Physical Exam   Constitutional: No distress.   HENT:   Head: Normocephalic and atraumatic.   Eyes: No scleral icterus.   Neck: Normal range of motion.   Cardiovascular: Normal rate and regular rhythm.    Pulmonary/Chest: Effort normal and breath sounds normal.   Abdominal: Soft. Bowel sounds are normal. He exhibits no distension and no mass. There is no tenderness. There is no rebound and no guarding. No hernia.   ALAN: with brown stools   Musculoskeletal: He exhibits no edema.   L BKA   Neurological:   Patient sleepy but wakes up to his name   Skin: He is not diaphoretic.       Significant Labs:  CBC:   Recent Labs  Lab 05/21/18  1131   WBC 6.26   HGB 8.7*   HCT 26.5*        CMP:   Recent Labs  Lab 05/21/18  1131 05/21/18  1550   *  --    CALCIUM 8.4*  --    ALBUMIN 3.1*  --    PROT 7.9  --      --    K 2.9* 4.0   CO2 30*  --    CL 99  --    BUN 10  --    CREATININE 5.8*  --    ALKPHOS 142*  --    ALT 7*  --    AST 20  --    BILITOT 0.4  --      Coagulation:   Recent Labs  Lab 05/21/18  1131   INR 1.0   APTT 24.0       Significant Imaging:  Imaging results within the past 24 hours have been reviewed.

## 2018-05-22 NOTE — ASSESSMENT & PLAN NOTE
52 year old male with a history of HTN, DM Type 2, ESRD, ICH, and PUD/reflux esophagitis on who GI is being consulted for hematemesis.    Based on recent EGD and reported noncompliance his hematemesis is likely secondary to worsening esophagitis/gastritis as he remains HD stable with Hgb at baseline.    Recommendations:  CLD now and NPO after MN for EGD  Transfuse H/H and transfuse as needed  PPI IV BID

## 2018-05-22 NOTE — HPI
"52 year old male with a history of HTN, DM Type 2, ESRD, ICH, and PUD/reflux esophagitis on who GI is being consulted for hematemesis.    Per primary team:  "Patient presents with hematemesis x 4 days.  Pt reports last episode of vomiting was yesterday.  He reports noncompliance with bid PPI stating he last took ~1 week ago.  +epigastric pain after vomiting.  No fever, chills, diarrhea, SOB, dizziness, lightheadedness.  Of note this is pt's 5th admission since Feb 2018.  Noted last admission pt with AMS and reports from family stated behavior change x 1 year.  Pt stopped drinking alcohol several years ago and denies drug use."     Interval History:  In the ED he was given Zofran, PPI, and Haldol for his nausea/emesis. By the time we arrived to examine him he was very sleepy however could confirm episode of hematemesis as well as non-compliance with PPI.    Prior GI Procedures:  EGD 3/16/18:  - Normal examined duodenum.  - One gastric polyp. Resected and retrieved.  - Erythematous mucosa in the gastric body, antrum  and prepyloric region of the stomach. Biopsied.  - 4 cm hiatal hernia.  - LA Grade C reflux esophagitis.  - Non-bleeding gastric ulcer with a clean ulcer base (Lucas Class III).    EGD 3/8/18:  - LA Grade B reflux esophagitis.  - 3 cm sliding hiatal hernia.  - A single gastric polyp.  - Normal examined duodenum.  "

## 2018-05-22 NOTE — HOSPITAL COURSE
Vaughn Retana was admitted for possible GI bleed. GI consulted on admit and completed EGD today which demonstrated grade D esophagitis but no active bleeding. Patient had endorsed noncompliance with PPI; education was provided and patient verbalized understanding. Stable to discharge home.

## 2018-05-22 NOTE — [employer] PATIENT INSTRUCTIONS
Discharge Summary/Instructions after an Endoscopic Procedure  Patient Name: Vaughn Retana  Patient MRN: [id number]  Patient YOB: 1965  Tuesday, May 22, 2018  Ke Sparks MD  RESTRICTIONS:  During your procedure today, you received medications for sedation.  These   medications may affect your judgment, balance and coordination.  Therefore,   for 24 hours, you have the following restrictions:   - DO NOT drive a car, operate machinery, make legal/financial decisions,   sign important papers or drink alcohol.    ACTIVITY:  The following day: return to full activity including work, except no heavy   lifting, straining or running for 3 days if polyps were removed.  DIET:  Eat and drink normally unless instructed otherwise.     TREATMENT FOR COMMON SIDE EFFECTS:  - Mild abdominal pain, nausea, belching, bloating or excessive gas:  rest,   eat lightly and use a heating pad.  - Sore Throat: treat with throat lozenges and/or gargle with warm salt   water.  - Because air was used during the procedure, expelling large amounts of air   from your rectum or belching is normal.  - If a bowel prep was taken, you may not have a bowel movement for 1-3 days.    This is normal.  SYMPTOMS TO WATCH FOR AND REPORT TO YOUR PHYSICIAN:  1. Abdominal pain or bloating, other than gas cramps.  2. Chest pain.  3. Back pain.  4. Signs of infection such as: chills or fever occurring within 24 hours   after the procedure.  5. Rectal bleeding, which would show as bright red, maroon, or black stools.   (A tablespoon of blood from the rectum is not serious, especially if   hemorrhoids are present.)  6. Vomiting.  7. Weakness or dizziness.  GO DIRECTLY TO THE NEAREST EMERGENCY ROOM IF YOU HAVE ANY OF THE FOLLOWING:      Difficulty breathing              Chills and/or fever over 101 F   Persistent vomiting and/or vomiting blood   Severe abdominal pain   Severe chest pain   Black, tarry stools   Bleeding- more than one tablespoon   Any other  symptom or condition that you feel may need urgent attention  Your doctor recommends these additional instructions:  If any biopsies were taken, your doctors clinic will contact you in 1 to 2   weeks with any results.  - Return patient to [facility] for ongoing care.   - Advance diet as tolerated.   - Continue present medications.   - Use Protonix (pantoprazole) 40 mg PO BID.   - Repeat upper endoscopy in 3 months to check healing.   - Return to GI clinic at appointment to be scheduled.  For questions, problems or results please call your physician - Ke Sparks MD at Work:  [phone].  [facility], EMERGENCY ROOM PHONE NUMBER: [phone]  IF A COMPLICATION OR EMERGENCY SITUATION ARISES AND YOU ARE UNABLE TO REACH   YOUR PHYSICIAN - GO DIRECTLY TO THE EMERGENCY ROOM.  Ke Sparks MD  5/22/2018 9:32:31 AM  This report has been verified and signed electronically.  [employer]

## 2018-05-22 NOTE — PLAN OF CARE
Problem: Patient Care Overview  Goal: Plan of Care Review  Outcome: Ongoing (interventions implemented as appropriate)  Patient has been free of falls this shift. He has been sleeping all night, but is easily aroused. He is AAOx4 and VS's have been elevated but within parameters.

## 2018-05-22 NOTE — ASSESSMENT & PLAN NOTE
"H/H dropped 7/21, will monitor. EGD today.  - Suspect exacerbated by noncompliance with PPI (reports last taking ~1 week ago).  - started protonix PO BID   - Hgb fairly stable and BP elevated upon arrival  - N/V, abdominal pain resolved with antiemetics  - Type & Screen ordered and blood consent signed  - GI notified of admission as pt scheduled for ulcer sealing 6/12/18.  Will not place formal consult on admission as pt stable at this time and hematemesis described as "brown" per patient.  "

## 2018-05-22 NOTE — ASSESSMENT & PLAN NOTE
HD tomorrow  HD MWF, Nephrology consulted  When able to advance diet, will advance to diabetic renal diet

## 2018-05-22 NOTE — DISCHARGE INSTRUCTIONS

## 2018-05-22 NOTE — SUBJECTIVE & OBJECTIVE
Interval History: No events overnight. Patient resting in bed, no complaints.     Review of Systems   Constitutional: Negative for chills, diaphoresis, fatigue, fever and unexpected weight change.   HENT: Negative for drooling, facial swelling, trouble swallowing and voice change.    Eyes: Negative for photophobia, pain, redness and visual disturbance.   Respiratory: Negative for cough, chest tightness, shortness of breath and wheezing.    Cardiovascular: Negative for chest pain, palpitations and leg swelling.   Gastrointestinal: Positive for abdominal pain, nausea and vomiting. Negative for blood in stool, constipation, diarrhea and rectal pain.   Endocrine: Negative for cold intolerance, heat intolerance, polydipsia, polyphagia and polyuria.   Genitourinary: Negative for dysuria, flank pain, frequency and hematuria.   Musculoskeletal: Negative for arthralgias, back pain, myalgias and neck pain.   Skin: Negative for color change, pallor, rash and wound.   Neurological: Negative for dizziness, tremors, syncope, facial asymmetry, speech difficulty, weakness, light-headedness and headaches.   Hematological: Negative for adenopathy. Does not bruise/bleed easily.   Psychiatric/Behavioral: Negative for agitation, decreased concentration, dysphoric mood and sleep disturbance. The patient is not nervous/anxious.      Objective:     Vital Signs (Most Recent):  Temp: 98.3 °F (36.8 °C) (05/22/18 1547)  Pulse: 96 (05/22/18 1547)  Resp: 18 (05/22/18 1547)  BP: (!) 182/108 (05/22/18 1547)  SpO2: 100 % (05/22/18 1547) Vital Signs (24h Range):  Temp:  [97 °F (36.1 °C)-98.5 °F (36.9 °C)] 98.3 °F (36.8 °C)  Pulse:  [75-99] 96  Resp:  [12-20] 18  SpO2:  [97 %-100 %] 100 %  BP: (149-182)/() 182/108     Weight: 78.6 kg (173 lb 3 oz)  Body mass index is 27.95 kg/m².    Intake/Output Summary (Last 24 hours) at 05/22/18 9147  Last data filed at 05/22/18 0977   Gross per 24 hour   Intake              100 ml   Output                0  ml   Net              100 ml      Physical Exam   Constitutional: He appears well-nourished. No distress.   HENT:   Head: Normocephalic and atraumatic.   Eyes: EOM are normal. No scleral icterus.   Neck: Normal range of motion.   Cardiovascular: Normal rate and regular rhythm.    Pulmonary/Chest: Effort normal and breath sounds normal.   Abdominal: Soft. Bowel sounds are normal. He exhibits no distension and no mass. There is no tenderness. There is no rebound. No hernia.   ALAN: with brown stools   Musculoskeletal: He exhibits no edema.   L BKA   Skin: Skin is warm and dry. He is not diaphoretic.       Significant Labs: All pertinent labs within the past 24 hours have been reviewed.    Significant Imaging: I have reviewed all pertinent imaging results/findings within the past 24 hours.

## 2018-05-22 NOTE — ANESTHESIA POSTPROCEDURE EVALUATION
"Anesthesia Post Evaluation    Patient: Vaughn Retana    Procedure(s) Performed: Procedure(s) (LRB):  ESOPHAGOGASTRODUODENOSCOPY (EGD) (N/A)    Final Anesthesia Type: general  Patient location during evaluation: PACU  Patient participation: Yes- Able to Participate  Level of consciousness: awake and alert  Post-procedure vital signs: reviewed and stable  Pain management: adequate  Airway patency: patent  PONV status at discharge: No PONV  Anesthetic complications: no      Cardiovascular status: blood pressure returned to baseline  Respiratory status: unassisted  Hydration status: euvolemic  Follow-up not needed.        Visit Vitals  BP (!) 161/67   Pulse 94   Temp 36.3 °C (97.3 °F) (Skin)   Resp 18   Ht 5' 6" (1.676 m)   Wt 78.6 kg (173 lb 3 oz)   SpO2 100%   BMI 27.95 kg/m²       Pain/Haseeb Score: Pain Assessment Performed: Yes (5/22/2018  9:36 AM)  Presence of Pain: denies (5/22/2018  9:36 AM)  Pain Rating Prior to Med Admin: 0 (5/22/2018  8:35 AM)  Haseeb Score: 9 (5/22/2018  9:36 AM)      "

## 2018-05-22 NOTE — PLAN OF CARE
05/22/18 1445   Discharge Assessment   Assessment Type Discharge Planning Assessment   Confirmed/corrected address and phone number on facesheet? Yes   Assessment information obtained from? Patient   Expected Length of Stay (days) 2   Communicated expected length of stay with patient/caregiver yes   Prior to hospitilization cognitive status: Alert/Oriented   Prior to hospitalization functional status: Assistive Equipment   Current cognitive status: Alert/Oriented   Current Functional Status: Assistive Equipment   Lives With other relative(s)  (Daisy gooden ([phone]))   Able to Return to Prior Arrangements yes   Is patient able to care for self after discharge? Yes   Patient's perception of discharge disposition home or selfcare   Readmission Within The Last 30 Days no previous admission in last 30 days   Patient currently being followed by outpatient case management? No   Patient currently receives any other outside agency services? No   Equipment Currently Used at Home walker, rolling;wheelchair;prosthesis;shower chair   Do you have any problems affording any of your prescribed medications? No   Is the patient taking medications as prescribed? yes   Does the patient have transportation home? Yes   Transportation Available family or friend will provide   Dialysis Name and Scheduled days [facility] [place] [id number]   Does the patient receive services at the [facility]? No   Discharge Plan A Home with family   Discharge Plan B Home Health   Patient/Family In Agreement With Plan yes     Dx: chronic UGI bld  Consult: GI  Pharm: [facility]  Hosp f/u appt: MARYJO Zelaya (PCP) on 5/31/18 at 0900

## 2018-05-22 NOTE — TREATMENT PLAN
Treatment Plan  05/22/2018  10:20 AM    EGD completed with impression and recs below.    Impression:             - Small hiatal hernia.  - LA Grade D reflux esophagitis.  - Normal stomach.  - Normal examined duodenum.    Recommendation:         - Return patient to [facility] for [facility] care.  - Advance diet as tolerated.  - Continue present medications.  - Use Protonix (pantoprazole) 40 mg PO BID.  - Repeat upper endoscopy in 3 months to check healing (order placed)  - Return to GI clinic at appointment to be scheduled.    We thank you for this consultation, we will sign off at this time.    Rodri Sheets M.D.  Gastroenterology Fellow, PGY-IV  Pager: [phone]  [facility]

## 2018-05-22 NOTE — PROGRESS NOTES
[facility] Hospital Medicine  Progress Note    Patient Name: Vaughn Retana  MRN: [id number]  Patient Class: IP- Inpatient   Admission Date: 5/21/2018  Length of Stay: 0 days  Attending Physician: Lainey Wheat MD  Primary Care Provider: Yvette Zelaya NP    [facility] Medicine Team: [facility] HOSP MED E Rancho Carvalho PA-C    Subjective:     Principal Problem:Chronic upper GI bleeding    HPI:  53 y/o M with PMH PUD (EGD scheduled for 6/12/18 for ulcer sealing), reflux esophagitis, T2DM with diabetic kidney disease on HD MWF and h/o L BKA, h/o ICH (suspected due to HTN) presents with hematemesis x 4 days.  Pt reports last episode of vomiting was yesterday.  He reports noncompliance with bid PPI stating he last took ~1 week ago.  +epigastric pain after vomiting.  No fever, chills, diarrhea, SOB, dizziness, lightheadedness.  Of note this is pt's 5th admission since Feb 2018.  Noted last admission pt with AMS and reports from family stated behavior change x 1 year.  Pt stopped drinking alcohol several years ago and denies drug use.    In the ED, pt given ODT zofran, IV haldol and IV protonix which resolved nausea/vomiting and abdominal pain.  Hgb 8.7 (close to baseline).  K 2.9.  Lipase and lactic acid negative.  CXR with right subclavian vascular stent as before.  Heart is not enlarged.  Mild accentuation interstitial markings.  No significant airspace consolidation or pleural effusion identified.  /103 upon arrival (completed 1 hr of HD today and had not taken lisinopril).    Hospital Course:  Vaughn Retana was admitted for possible GI bleed. GI consulted on admit and completed EGD today.     Interval History: No events overnight. Patient resting in bed, no complaints.     Review of Systems   Constitutional: Negative for chills, diaphoresis, fatigue, fever and unexpected weight change.   HENT: Negative for drooling, facial swelling, trouble swallowing and voice change.    Eyes:  Negative for photophobia, pain, redness and visual disturbance.   Respiratory: Negative for cough, chest tightness, shortness of breath and wheezing.    Cardiovascular: Negative for chest pain, palpitations and leg swelling.   Gastrointestinal: Positive for abdominal pain, nausea and vomiting. Negative for blood in stool, constipation, diarrhea and rectal pain.   Endocrine: Negative for cold intolerance, heat intolerance, polydipsia, polyphagia and polyuria.   Genitourinary: Negative for dysuria, flank pain, frequency and hematuria.   Musculoskeletal: Negative for arthralgias, back pain, myalgias and neck pain.   Skin: Negative for color change, pallor, rash and wound.   Neurological: Negative for dizziness, tremors, syncope, facial asymmetry, speech difficulty, weakness, light-headedness and headaches.   Hematological: Negative for adenopathy. Does not bruise/bleed easily.   Psychiatric/Behavioral: Negative for agitation, decreased concentration, dysphoric mood and sleep disturbance. The patient is not nervous/anxious.      Objective:     Vital Signs (Most Recent):  Temp: 98.3 °F (36.8 °C) (05/22/18 1547)  Pulse: 96 (05/22/18 1547)  Resp: 18 (05/22/18 1547)  BP: (!) 182/108 (05/22/18 1547)  SpO2: 100 % (05/22/18 1547) Vital Signs (24h Range):  Temp:  [97 °F (36.1 °C)-98.5 °F (36.9 °C)] 98.3 °F (36.8 °C)  Pulse:  [75-99] 96  Resp:  [12-20] 18  SpO2:  [97 %-100 %] 100 %  BP: (149-182)/() 182/108     Weight: 78.6 kg (173 lb 3 oz)  Body mass index is 27.95 kg/m².    Intake/Output Summary (Last 24 hours) at 05/22/18 1727  Last data filed at 05/22/18 0931   Gross per 24 hour   Intake              100 ml   Output                0 ml   Net              100 ml      Physical Exam   Constitutional: He appears well-nourished. No distress.   HENT:   Head: Normocephalic and atraumatic.   Eyes: EOM are normal. No scleral icterus.   Neck: Normal range of motion.   Cardiovascular: Normal rate and regular rhythm.   "  Pulmonary/Chest: Effort normal and breath sounds normal.   Abdominal: Soft. Bowel sounds are normal. He exhibits no distension and no mass. There is no tenderness. There is no rebound. No hernia.   Musculoskeletal: He exhibits no edema.   L BKA   Skin: Skin is warm and dry. He is not diaphoretic.       Significant Labs: All pertinent labs within the past 24 hours have been reviewed.    Significant Imaging: I have reviewed all pertinent imaging results/findings within the past 24 hours.    Assessment/Plan:      * Chronic upper GI bleeding    H/H dropped 7/21, will monitor. EGD today.  - Suspect exacerbated by noncompliance with PPI (reports last taking ~1 week ago).  - started protonix PO BID   - Hgb fairly stable and BP elevated upon arrival  - N/V, abdominal pain resolved with antiemetics  - Type & Screen ordered and blood consent signed  - GI notified of admission as pt scheduled for ulcer sealing 6/12/18.  Will not place formal consult on admission as pt stable at this time and hematemesis described as "brown" per patient.        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, without long-term current use of insulin    Diet controlled  Last HgbA1c 4.2% (03/2018)  Pt NPO on admission however when able to resume diet will advance to diabetic renal diet  Hyper/hypoglycemia protocols in place          Renovascular hypertension    Elevated to 200/103 on admission and improved to 188/98 with administration of home lisinopril 40 mg daily  Pt only completed 1 hr of HD this morning.  Nephrology consulted.          Hypokalemia    Improved, 3.8 today  2.9 on admission, likely low due to GI losses  Pt given K 40 mEq x 2  Will monitor renal function panel and Mg daily        History of Intracerebral Hemorrhage: L BG 5/2013; R BG 9/2016; R BG 11/2016; L caudate head 2/2017    Last head CT 4/3/2018: multifocal regions of encephalomalacia, suggesting remote lacunar infarcts noting encephalomalacia in the region " of previous left intraparenchymal/thalamic hemorrhage.  Reviewed most recent admission in which pt admitted for AMS.  It was documented that family has been noticing behavior changes for ~1 year.  Pt denies drug/alcohol, urine tox ordered.  Low threshold to order CT head.  Neuro checks q 4 hrs.  Pt falling asleep during interview and upon entering room pt had blanket over his head.  When provider was leaving the room, pt became much more alert asking when he would be able to eat.        History of left below knee amputation 12/18/13    No signs of infection  Believed due to gangrene infection          ESRD on hemodialysis    HD tomorrow  HD MWF, Nephrology consulted  When able to advance diet, will advance to diabetic renal diet        Anemia in chronic kidney disease    Hgb 8.7 on admission (baseline Hgb ranging mid 8 to 9)  Monitor daily            VTE Risk Mitigation         Ordered     IP VTE HIGH RISK PATIENT  Once      05/22/18 0146     Place MEL hose  Until discontinued      05/22/18 0146     Place sequential compression device  Until discontinued      05/22/18 0146     Place sequential compression device  Until discontinued      05/21/18 1307              Rancho Carvalho PA-C  Department of Hospital Medicine   [facility]

## 2018-05-22 NOTE — TELEPHONE ENCOUNTER
05/22/2018  10:22 AM      Patient needs EGD in 3 months to check for healing s/p EGD on 5/22/18    Rodri Sheets M.D.  Gastroenterology Fellow, PGY-IV  Pager: [phone]  [facility]

## 2018-05-22 NOTE — PLAN OF CARE
Problem: Patient Care Overview  Goal: Plan of Care Review  Outcome: Outcome(s) achieved Date Met: 05/22/18  POC reviewed with pt. Safety precautions maintained. Bed in low position wheels locked. Side rails up x 2  Call light within reach. Pt free of falls. No pressure ulcer noted. Bg monitored achs. emesis episode x 1 prn med given.

## 2018-05-22 NOTE — NURSING TRANSFER
Nursing Transfer Note      5/22/2018     Transfer To: OBS 4 From [facility] 39    Transfer via stretcher    Transfer with nothing    Transported by PCT    Medicines sent: none    Chart send with patient: Yes    Notified: Ayde Rn    Patient reassessed at: 1045 5/22/18    Upon arrival to floor: cardiac monitor applied, patient oriented to room, call bell in reach and bed in lowest position    VSS. No complaints of pain reported. Pt able to tolerate PO intake. MD Sparks to bedside to assess pt and reported findings. Pt rested while in [facility].

## 2018-05-22 NOTE — TRANSFER OF CARE
"Anesthesia Transfer of Care Note    Patient: Vaughn Retana    Procedure(s) Performed: Procedure(s) (LRB):  ESOPHAGOGASTRODUODENOSCOPY (EGD) (N/A)    Patient location: [facility]    Anesthesia Type: general    Transport from OR: Transported from OR on 2-3 L/min O2 by NC with adequate spontaneous ventilation    Post pain: adequate analgesia    Post assessment: no apparent anesthetic complications    Post vital signs: stable    Level of consciousness: awake    Nausea/Vomiting: no nausea/vomiting    Complications: none    Transfer of care protocol was followed      Last vitals:   Visit Vitals  BP (!) 170/67 (BP Location: Right leg, Patient Position: Lying)   Pulse 75   Temp 36.5 °C (97.7 °F) (Temporal)   Resp 18   Ht 5' 6" (1.676 m)   Wt 78.6 kg (173 lb 3 oz)   SpO2 100%   BMI 27.95 kg/m²     "

## 2018-05-23 VITALS
DIASTOLIC BLOOD PRESSURE: 94 MMHG | HEART RATE: 94 BPM | BODY MASS INDEX: 27.83 KG/M2 | RESPIRATION RATE: 18 BRPM | OXYGEN SATURATION: 100 % | TEMPERATURE: 98 F | WEIGHT: 173.19 LBS | HEIGHT: 66 IN | SYSTOLIC BLOOD PRESSURE: 194 MMHG

## 2018-05-23 DIAGNOSIS — R06.6 HICCUPS: ICD-10-CM

## 2018-05-23 PROBLEM — K29.60 EROSIVE GASTRITIS: Status: ACTIVE | Noted: 2018-05-23

## 2018-05-23 LAB
ALBUMIN SERPL BCP-MCNC: 2.8 G/DL
ANION GAP SERPL CALC-SCNC: 12 MMOL/L
BASOPHILS # BLD AUTO: 0.02 K/UL
BASOPHILS NFR BLD: 0.3 %
BUN SERPL-MCNC: 16 MG/DL
CALCIUM SERPL-MCNC: 7.9 MG/DL
CHLORIDE SERPL-SCNC: 101 MMOL/L
CO2 SERPL-SCNC: 25 MMOL/L
CREAT SERPL-MCNC: 9.3 MG/DL
DIFFERENTIAL METHOD: ABNORMAL
EOSINOPHIL # BLD AUTO: 0.6 K/UL
EOSINOPHIL NFR BLD: 9.1 %
ERYTHROCYTE [DISTWIDTH] IN BLOOD BY AUTOMATED COUNT: 13.5 %
EST. GFR  (AFRICAN AMERICAN): 6.7 ML/MIN/1.73 M^2
EST. GFR  (NON AFRICAN AMERICAN): 5.8 ML/MIN/1.73 M^2
GLUCOSE SERPL-MCNC: 93 MG/DL
HCT VFR BLD AUTO: 24.8 %
HGB BLD-MCNC: 8.1 G/DL
IMM GRANULOCYTES # BLD AUTO: 0.02 K/UL
IMM GRANULOCYTES NFR BLD AUTO: 0.3 %
LYMPHOCYTES # BLD AUTO: 1.7 K/UL
LYMPHOCYTES NFR BLD: 28.8 %
MAGNESIUM SERPL-MCNC: 2.2 MG/DL
MCH RBC QN AUTO: 31.9 PG
MCHC RBC AUTO-ENTMCNC: 32.7 G/DL
MCV RBC AUTO: 98 FL
MONOCYTES # BLD AUTO: 0.4 K/UL
MONOCYTES NFR BLD: 7 %
NEUTROPHILS # BLD AUTO: 3.3 K/UL
NEUTROPHILS NFR BLD: 54.5 %
NRBC BLD-RTO: 0 /100 WBC
PHOSPHATE SERPL-MCNC: 3.3 MG/DL
PLATELET # BLD AUTO: 245 K/UL
PMV BLD AUTO: 10.1 FL
POCT GLUCOSE: 121 MG/DL (ref 70–110)
POCT GLUCOSE: 96 MG/DL (ref 70–110)
POTASSIUM SERPL-SCNC: 3.3 MMOL/L
RBC # BLD AUTO: 2.54 M/UL
SODIUM SERPL-SCNC: 138 MMOL/L
WBC # BLD AUTO: 6.04 K/UL

## 2018-05-23 PROCEDURE — 80069 RENAL FUNCTION PANEL: CPT

## 2018-05-23 PROCEDURE — 25000003 PHARM REV CODE 250: Performed by: NURSE PRACTITIONER

## 2018-05-23 PROCEDURE — 36415 COLL VENOUS BLD VENIPUNCTURE: CPT

## 2018-05-23 PROCEDURE — 90935 HEMODIALYSIS ONE EVALUATION: CPT

## 2018-05-23 PROCEDURE — 82962 GLUCOSE BLOOD TEST: CPT

## 2018-05-23 PROCEDURE — 25000003 PHARM REV CODE 250: Performed by: PHYSICIAN ASSISTANT

## 2018-05-23 PROCEDURE — 85025 COMPLETE CBC W/AUTO DIFF WBC: CPT

## 2018-05-23 PROCEDURE — 83735 ASSAY OF MAGNESIUM: CPT

## 2018-05-23 PROCEDURE — 90935 HEMODIALYSIS ONE EVALUATION: CPT | Mod: ,,, | Performed by: NURSE PRACTITIONER

## 2018-05-23 PROCEDURE — 99238 HOSP IP/OBS DSCHRG MGMT 30/<: CPT | Mod: ,,, | Performed by: PHYSICIAN ASSISTANT

## 2018-05-23 RX ORDER — SODIUM CHLORIDE 9 MG/ML
INJECTION, SOLUTION INTRAVENOUS ONCE
Status: COMPLETED | OUTPATIENT
Start: 2018-05-23 | End: 2018-05-23

## 2018-05-23 RX ORDER — PANTOPRAZOLE SODIUM 40 MG/1
40 TABLET, DELAYED RELEASE ORAL
Qty: 60 TABLET | Refills: 11 | OUTPATIENT
Start: 2018-05-23 | End: 2019-05-23

## 2018-05-23 RX ADMIN — SODIUM CHLORIDE 350 ML: 0.9 INJECTION, SOLUTION INTRAVENOUS at 08:05

## 2018-05-23 RX ADMIN — LISINOPRIL 40 MG: 20 TABLET ORAL at 12:05

## 2018-05-23 RX ADMIN — PANTOPRAZOLE SODIUM 40 MG: 40 TABLET, DELAYED RELEASE ORAL at 06:05

## 2018-05-23 NOTE — PROGRESS NOTES
[facility] NOTE     Patient currently on hemodialysis for removal of uremic toxins and volume.     Patient seen and evaluated on hemodialysis, tolerating treatment, see HD flowsheet for vitals and assessments.      Ultrafiltration goal is 2L     Labs have been reviewed and the dialysate bath has been adjusted.     Assessment/Plan:  Seen on dialysis this morning, tolerating well.  Will continue iHD while in-patient.  Phos WNL.  Continue holding binders.  Renal diet        VANITA Valentine, [facility]  Nephrology  Pager:  [phone]         I have reviewed and concur with the NP's hemodialysis prescription plan. I have personally interviewed and examined the patient at bedside during hemodialysis session.

## 2018-05-23 NOTE — PLAN OF CARE
Problem: Patient Care Overview  Goal: Plan of Care Review  Outcome: Ongoing (interventions implemented as appropriate)  Plan of care reviewed with patient. No distress noted at this time. Fall precautions maintained with bed in lowest position, wheels locked, and call bell within reach. Prosthesis in bedside. Pt denies pain at this time. Will continue to monitor.

## 2018-05-23 NOTE — PLAN OF CARE
05/23/18 1632   Final Note   Assessment Type Final Discharge Note     Patient discharged home with no needs on 5/23/18.

## 2018-05-23 NOTE — PROGRESS NOTES
Pt returned to unit via stretcher from Dialysis.  Pt aaox3, no distress noted.  RUE Fistula assessed, positive for thrill and bruit, dressing cdi.  Call light placed within reach.

## 2018-05-23 NOTE — PROGRESS NOTES
Patient was dialyzed for 3.30mins, stable during HD no issues noeed. Dr. Galicia came in and seen the patient without new orders made. Rinse back done, removed needles x 2 without bleeding. Report given to Jannet GUEVARA. Transported back to floors per wheelchair with the transporter.

## 2018-05-23 NOTE — PLAN OF CARE
Problem: Patient Care Overview  Goal: Plan of Care Review  Outcome: Ongoing (interventions implemented as appropriate)  Pt aaox3, vss, no distress noted.  Accuchecks ac/hs.  Safety precautions maintained, pt remains free of falls.  Pt denies pain.  Dialysis done today.  Call light within reach.  Possible d/c today.

## 2018-05-23 NOTE — PROGRESS NOTES
Received patient per stretcher, on room air, conscious, awake and alert. On room air. With peripheral; line on left antecubital area. With amputated left leg to an artifical leg. AVF on upper left arm with good bruit and thrill, cannulated with gauge 15 x 2 with no issues notes. HD started 3.30mins x 2L. Will monitor accordingly.

## 2018-05-23 NOTE — PROGRESS NOTES
Pt discharged from unit.  Pt aaox3, vss, no s/s of distress noted.  Pt denies pain.  Discharge instructions given to pt and he verbalized understanding.  Home meds and f/u appts reviewed as well.  IV removed from left hand w/ catheter intact, no redness or swelling noted to area.  Pt refused w/c but was accompanied by front of hospital to wait for ride.

## 2018-05-24 RX ORDER — ONDANSETRON HYDROCHLORIDE 8 MG/1
TABLET, FILM COATED ORAL
Qty: 15 TABLET | Refills: 0 | Status: SHIPPED | OUTPATIENT
Start: 2018-05-24 | End: 2018-09-12 | Stop reason: SDUPTHER

## 2018-05-25 ENCOUNTER — PATIENT MESSAGE (OUTPATIENT)
Dept: ENDOSCOPY | Facility: HOSPITAL | Age: 53
End: 2018-05-25

## 2018-05-26 NOTE — DISCHARGE SUMMARY
[facility] Hospital Medicine  Discharge Summary      Patient Name: Vaughn Retana  MRN: [id number]  Admission Date: 5/21/2018  Hospital Length of Stay: 1 days  Discharge Date and Time: 5/23/2018  3:27 PM  Attending Physician: No att. providers found   Discharging Provider: Rancho Carvalho PA-C  Primary Care Provider: Yvette Zelaya NP  Hospital Medicine Team: [facility] HOSP MED E Rancho Carvalho PA-C    HPI:   53 y/o M with PMH PUD (EGD scheduled for 6/12/18 for ulcer sealing), reflux esophagitis, T2DM with diabetic kidney disease on HD MWF and h/o L BKA, h/o ICH (suspected due to HTN) presents with hematemesis x 4 days.  Pt reports last episode of vomiting was yesterday.  He reports noncompliance with bid PPI stating he last took ~1 week ago.  +epigastric pain after vomiting.  No fever, chills, diarrhea, SOB, dizziness, lightheadedness.  Of note this is pt's 5th admission since Feb 2018.  Noted last admission pt with AMS and reports from family stated behavior change x 1 year.  Pt stopped drinking alcohol several years ago and denies drug use.    In the ED, pt given ODT zofran, IV haldol and IV protonix which resolved nausea/vomiting and abdominal pain.  Hgb 8.7 (close to baseline).  K 2.9.  Lipase and lactic acid negative.  CXR with right subclavian vascular stent as before.  Heart is not enlarged.  Mild accentuation interstitial markings.  No significant airspace consolidation or pleural effusion identified.  /103 upon arrival (completed 1 hr of HD today and had not taken lisinopril).    Procedure(s) (LRB):  ESOPHAGOGASTRODUODENOSCOPY (EGD) (N/A)      Hospital Course:   Vaughn Retana was admitted for possible GI bleed. GI consulted on admit and completed EGD today which demonstrated grade D esophagitis but no active bleeding. Patient had endorsed noncompliance with PPI; education was provided and patient verbalized understanding. Stable to discharge home.     Consults:    Consults         Status Ordering Provider     Inpatient consult to Gastroenterology  Once     Provider:  (Not yet assigned)    Completed LUCIANA HENRIQUEZ     Inpatient consult to Nephrology  Once     Provider:  (Not yet assigned)    Completed BE MANCILLA          No new Assessment & Plan notes have been filed under this hospital service since the last note was generated.  Service: Hospital Medicine    Final Active Diagnoses:    Diagnosis Date Noted POA    PRINCIPAL PROBLEM:  Chronic upper GI bleeding [K92.2] 05/21/2018 Yes    Erosive gastritis [K29.60] 05/23/2018 Yes    Gastrointestinal hemorrhage [K92.2] 05/22/2018 Yes    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, without long-term current use of insulin [E11.29] 05/21/2018 Unknown    Normocytic anemia [D64.9] 05/21/2018 Unknown    Renovascular hypertension [I15.0] 03/16/2018 Yes     Chronic    Hypokalemia [E87.6] 03/08/2018 Yes     Chronic    History of Intracerebral Hemorrhage: L BG 5/2013; R BG 9/2016; R BG 11/2016; L caudate head 2/2017 [Z86.79] 11/02/2016 Not Applicable     Chronic    History of left below knee amputation 12/18/13 [Z89.512] 12/19/2013 Not Applicable     Chronic    ESRD on hemodialysis [N18.6, Z99.2] 02/07/2013 Not Applicable     Chronic    Anemia in chronic kidney disease [N18.9, D63.1] 09/17/2012 Yes     Chronic      Problems Resolved During this Admission:    Diagnosis Date Noted Date Resolved POA       Discharged Condition: stable    Disposition: Home or Self Care    Follow Up:  Follow-up Information     Yvette Zelaya NP On 5/31/2018.    Specialty:  Family Medicine  Why:  at 9:00 AM  Contact information:  [address]  [facility] [address] [phone]                 Patient Instructions:     Diet renal     Activity as tolerated         Significant Diagnostic Studies: Labs: All labs within the past 24 hours have been reviewed    Pending Diagnostic Studies:     None          Medications:  Reconciled Home Medications:      Medication List      ASK your doctor about these medications    lisinopril 40 MG tablet  Commonly known as:  PRINIVIL,ZESTRIL  Take 1 tablet (40 mg total) by mouth every evening.     pantoprazole 40 MG tablet  Commonly known as:  PROTONIX  Take 1 tablet (40 mg total) by mouth 2 (two) times daily before meals.            Indwelling Lines/Drains at time of discharge:   Lines/Drains/Airways     Drain                 Hemodialysis AV Fistula Right upper arm -- days         Hemodialysis AV Fistula 03/08/18 1522 Right upper arm 78 days                Time spent on the discharge of patient: 30 minutes  Patient was seen and examined on the date of discharge and determined to be suitable for discharge.         Rancho Carvalho PA-C  Department of Hospital Medicine  [facility]

## 2018-05-28 ENCOUNTER — TELEPHONE (OUTPATIENT)
Dept: ENDOSCOPY | Facility: HOSPITAL | Age: 53
End: 2018-05-28

## 2018-05-28 NOTE — TELEPHONE ENCOUNTER
Called patient to inform that Colonoscopy on 6/12/18 at 2:00pm needs to move to 1:00pm on 2nd Floor with Dr. Galicia. Patient did not answer and message was left for him to call back to [phone]

## 2018-06-12 ENCOUNTER — TELEPHONE (OUTPATIENT)
Dept: GASTROENTEROLOGY | Facility: CLINIC | Age: 53
End: 2018-06-12

## 2018-06-12 NOTE — TELEPHONE ENCOUNTER
Per Dr Galicia EGD is not needed today. Removed from schedule. Spoke with patient's sister and she is aware. Patient will need EGD in August. Will have Endo scheduling nurse contact patient.

## 2018-07-12 ENCOUNTER — TELEPHONE (OUTPATIENT)
Dept: GASTROENTEROLOGY | Facility: CLINIC | Age: 53
End: 2018-07-12

## 2018-07-12 NOTE — TELEPHONE ENCOUNTER
----- Message from Rodri Sheets MD sent at 7/12/2018 12:16 PM CDT -----  Hi    Mr. Retana needs GI clinic follow up in September to give him time to have repeat EGD in August. Appt can be with CALIXTO or with me if I have availability.     Rodri Sheets M.D.  Gastroenterology Fellow, PGY-V  [facility]

## 2018-07-30 DIAGNOSIS — N18.6 ESRD ON HEMODIALYSIS: Primary | Chronic | ICD-10-CM

## 2018-07-30 DIAGNOSIS — Z99.2 ESRD ON HEMODIALYSIS: Primary | Chronic | ICD-10-CM

## 2018-08-15 ENCOUNTER — HOSPITAL ENCOUNTER (EMERGENCY)
Facility: HOSPITAL | Age: 53
Discharge: HOME | End: 2018-08-16
Attending: EMERGENCY MEDICINE
Payer: MEDICARE

## 2018-08-15 VITALS
HEART RATE: 71 BPM | DIASTOLIC BLOOD PRESSURE: 65 MMHG | TEMPERATURE: 98 F | HEIGHT: 67 IN | BODY MASS INDEX: 26.68 KG/M2 | RESPIRATION RATE: 18 BRPM | OXYGEN SATURATION: 99 % | SYSTOLIC BLOOD PRESSURE: 105 MMHG | WEIGHT: 170 LBS

## 2018-08-15 DIAGNOSIS — R53.1 WEAK: ICD-10-CM

## 2018-08-15 DIAGNOSIS — Z99.2 DIALYSIS PATIENT: Primary | ICD-10-CM

## 2018-08-15 LAB
ABO + RH BLD: NORMAL
ALBUMIN SERPL BCP-MCNC: 3.8 G/DL
ALP SERPL-CCNC: 133 U/L
ALT SERPL W/O P-5'-P-CCNC: 20 U/L
ANION GAP SERPL CALC-SCNC: 14 MMOL/L
APTT BLDCRRT: <21 SEC
AST SERPL-CCNC: 33 U/L
BASOPHILS # BLD AUTO: 0.04 K/UL
BASOPHILS NFR BLD: 0.7 %
BILIRUB SERPL-MCNC: 0.8 MG/DL
BLD GP AB SCN CELLS X3 SERPL QL: NORMAL
BUN SERPL-MCNC: 21 MG/DL
BUN SERPL-MCNC: 29 MG/DL (ref 6–30)
CALCIUM SERPL-MCNC: 9.9 MG/DL
CHLORIDE SERPL-SCNC: 96 MMOL/L
CHLORIDE SERPL-SCNC: 98 MMOL/L (ref 95–110)
CO2 SERPL-SCNC: 28 MMOL/L
CREAT SERPL-MCNC: 5.9 MG/DL
CREAT SERPL-MCNC: 5.9 MG/DL (ref 0.5–1.4)
DIFFERENTIAL METHOD: ABNORMAL
EOSINOPHIL # BLD AUTO: 0.2 K/UL
EOSINOPHIL NFR BLD: 3.1 %
ERYTHROCYTE [DISTWIDTH] IN BLOOD BY AUTOMATED COUNT: 14.7 %
EST. GFR  (AFRICAN AMERICAN): 11.5 ML/MIN/1.73 M^2
EST. GFR  (NON AFRICAN AMERICAN): 9.9 ML/MIN/1.73 M^2
GLUCOSE SERPL-MCNC: 74 MG/DL
GLUCOSE SERPL-MCNC: 76 MG/DL (ref 70–110)
HCT VFR BLD AUTO: 42.9 %
HCT VFR BLD AUTO: 44.1 %
HCT VFR BLD CALC: 48 %PCV (ref 36–54)
HGB BLD-MCNC: 14.5 G/DL
HGB BLD-MCNC: 14.9 G/DL
IMM GRANULOCYTES # BLD AUTO: 0.02 K/UL
IMM GRANULOCYTES NFR BLD AUTO: 0.3 %
INR PPP: 1.1
LIPASE SERPL-CCNC: 33 U/L
LYMPHOCYTES # BLD AUTO: 1.4 K/UL
LYMPHOCYTES NFR BLD: 23.8 %
MAGNESIUM SERPL-MCNC: 2.7 MG/DL
MCH RBC QN AUTO: 30.3 PG
MCHC RBC AUTO-ENTMCNC: 33.8 G/DL
MCV RBC AUTO: 90 FL
MONOCYTES # BLD AUTO: 0.9 K/UL
MONOCYTES NFR BLD: 15.3 %
NEUTROPHILS # BLD AUTO: 3.4 K/UL
NEUTROPHILS NFR BLD: 56.8 %
NRBC BLD-RTO: 0 /100 WBC
PLATELET # BLD AUTO: 149 K/UL
PMV BLD AUTO: 11.1 FL
POC IONIZED CALCIUM: 1.02 MMOL/L (ref 1.06–1.42)
POC TCO2 (MEASURED): 34 MMOL/L (ref 23–29)
POTASSIUM BLD-SCNC: 5 MMOL/L (ref 3.5–5.1)
POTASSIUM SERPL-SCNC: 5 MMOL/L
PROT SERPL-MCNC: 9.7 G/DL
PROTHROMBIN TIME: 11.3 SEC
RBC # BLD AUTO: 4.92 M/UL
SAMPLE: ABNORMAL
SODIUM BLD-SCNC: 138 MMOL/L (ref 136–145)
SODIUM SERPL-SCNC: 138 MMOL/L
WBC # BLD AUTO: 5.89 K/UL

## 2018-08-15 PROCEDURE — C9113 INJ PANTOPRAZOLE SODIUM, VIA: HCPCS | Performed by: EMERGENCY MEDICINE

## 2018-08-15 PROCEDURE — 93010 ELECTROCARDIOGRAM REPORT: CPT | Mod: ,,, | Performed by: INTERNAL MEDICINE

## 2018-08-15 PROCEDURE — 99284 EMERGENCY DEPT VISIT MOD MDM: CPT | Mod: 25

## 2018-08-15 PROCEDURE — 25000003 PHARM REV CODE 250: Performed by: EMERGENCY MEDICINE

## 2018-08-15 PROCEDURE — 83735 ASSAY OF MAGNESIUM: CPT

## 2018-08-15 PROCEDURE — 80053 COMPREHEN METABOLIC PANEL: CPT

## 2018-08-15 PROCEDURE — 83690 ASSAY OF LIPASE: CPT

## 2018-08-15 PROCEDURE — 99285 EMERGENCY DEPT VISIT HI MDM: CPT | Mod: ,,, | Performed by: EMERGENCY MEDICINE

## 2018-08-15 PROCEDURE — 85018 HEMOGLOBIN: CPT | Mod: 91

## 2018-08-15 PROCEDURE — 85610 PROTHROMBIN TIME: CPT

## 2018-08-15 PROCEDURE — 86901 BLOOD TYPING SEROLOGIC RH(D): CPT

## 2018-08-15 PROCEDURE — 63600175 PHARM REV CODE 636 W HCPCS: Performed by: EMERGENCY MEDICINE

## 2018-08-15 PROCEDURE — 96374 THER/PROPH/DIAG INJ IV PUSH: CPT

## 2018-08-15 PROCEDURE — 93005 ELECTROCARDIOGRAM TRACING: CPT

## 2018-08-15 PROCEDURE — 85025 COMPLETE CBC W/AUTO DIFF WBC: CPT

## 2018-08-15 PROCEDURE — 85014 HEMATOCRIT: CPT | Mod: 59

## 2018-08-15 PROCEDURE — 85730 THROMBOPLASTIN TIME PARTIAL: CPT

## 2018-08-15 RX ORDER — CHLORPROMAZINE HYDROCHLORIDE 25 MG/1
25 TABLET, FILM COATED ORAL EVERY 12 HOURS PRN
COMMUNITY
End: 2018-10-02 | Stop reason: SDUPTHER

## 2018-08-15 RX ORDER — FAMOTIDINE 40 MG/1
40 TABLET, FILM COATED ORAL DAILY PRN
COMMUNITY
End: 2018-10-02 | Stop reason: SDUPTHER

## 2018-08-15 RX ORDER — HYDRALAZINE HYDROCHLORIDE 25 MG/1
50 TABLET, FILM COATED ORAL
Status: COMPLETED | OUTPATIENT
Start: 2018-08-15 | End: 2018-08-15

## 2018-08-15 RX ORDER — CARVEDILOL 25 MG/1
25 TABLET ORAL 2 TIMES DAILY WITH MEALS
COMMUNITY
End: 2018-10-02 | Stop reason: SDUPTHER

## 2018-08-15 RX ORDER — NICARDIPINE HYDROCHLORIDE 0.2 MG/ML
5 INJECTION INTRAVENOUS CONTINUOUS
Status: DISCONTINUED | OUTPATIENT
Start: 2018-08-15 | End: 2018-08-15

## 2018-08-15 RX ORDER — AMLODIPINE BESYLATE 10 MG/1
10 TABLET ORAL DAILY
COMMUNITY
End: 2018-10-02 | Stop reason: SDUPTHER

## 2018-08-15 RX ORDER — PANTOPRAZOLE SODIUM 40 MG/10ML
80 INJECTION, POWDER, LYOPHILIZED, FOR SOLUTION INTRAVENOUS
Status: DISCONTINUED | OUTPATIENT
Start: 2018-08-15 | End: 2018-08-15

## 2018-08-15 RX ORDER — LISINOPRIL 20 MG/1
40 TABLET ORAL
Status: COMPLETED | OUTPATIENT
Start: 2018-08-15 | End: 2018-08-15

## 2018-08-15 RX ORDER — ATORVASTATIN CALCIUM 80 MG/1
80 TABLET, FILM COATED ORAL NIGHTLY
COMMUNITY
End: 2018-10-02 | Stop reason: SDUPTHER

## 2018-08-15 RX ORDER — CARVEDILOL 12.5 MG/1
25 TABLET ORAL
Status: COMPLETED | OUTPATIENT
Start: 2018-08-15 | End: 2018-08-15

## 2018-08-15 RX ORDER — HYDRALAZINE HYDROCHLORIDE 50 MG/1
50 TABLET, FILM COATED ORAL EVERY 8 HOURS
COMMUNITY
End: 2018-10-02 | Stop reason: SDUPTHER

## 2018-08-15 RX ORDER — AMLODIPINE BESYLATE 10 MG/1
10 TABLET ORAL
Status: COMPLETED | OUTPATIENT
Start: 2018-08-15 | End: 2018-08-15

## 2018-08-15 RX ADMIN — DEXTROSE 80 MG: 50 INJECTION, SOLUTION INTRAVENOUS at 01:08

## 2018-08-15 RX ADMIN — AMLODIPINE BESYLATE 10 MG: 10 TABLET ORAL at 01:08

## 2018-08-15 RX ADMIN — CARVEDILOL 25 MG: 12.5 TABLET, FILM COATED ORAL at 01:08

## 2018-08-15 RX ADMIN — LISINOPRIL 40 MG: 20 TABLET ORAL at 01:08

## 2018-08-15 RX ADMIN — HYDRALAZINE HYDROCHLORIDE 50 MG: 25 TABLET, FILM COATED ORAL at 01:08

## 2018-08-15 NOTE — ED NOTES
Call made to pt's sister, Alicia who states she will be to the ER shortly to take pt home.   MODERATE

## 2018-08-15 NOTE — ED NOTES
Pt resting quietly on stretcher; remains on continuous cardiac and pulse ox monitoring with non-invasive blood pressure to cycle every 30 minutes.  VS stable; NSR noted.  Pt requesting to eat but is to remain NPO at this time per Dr Blanca.  Bed locked in lowest position; side rails up and locked x 2; call light, bedside table, and personal belongings within reach.  Pt denies needs or complaints at this time; will continue to monitor pt.

## 2018-08-15 NOTE — ED NOTES
Pt resting quietly on stretcher; remains on continuous cardiac and pulse ox monitoring with non-invasive blood pressure to cycle every 15 minutes.  VS stable; NSR noted.  Pt provided with sandwiches; okay'd  Per Dr Blanca. Bed locked in lowest position; side rails up and locked x 2; call light, bedside table, and personal belongings within reach.  Pt denies needs or complaints at this time; will continue to monitor pt.

## 2018-08-15 NOTE — ED TRIAGE NOTES
"Pt with one episode of vomiting this morning during dialysis.  EMS reports there was "a little bit of brown" in the emesis. Pt reporting black stools over the past several weeks.  Pt denies lightheadedness, dizziness, or SOB; denies abdominal pain.  "

## 2018-08-15 NOTE — ED NOTES
Pt resting quietly on stretcher; remains on continuous cardiac and pulse ox monitoring with non-invasive blood pressure to cycle every 30 minutes.  VS stable; NSR noted. Bed locked in lowest position; side rails up and locked x 2; call light, bedside table, and personal belongings within reach.  Pt denies needs or complaints at this time; awaiting MD to discuss results and plan of care; will continue to monitor pt.

## 2018-08-15 NOTE — ED NOTES
LOC: The patient is awake, alert, and oriented to self and place, but disoriented to time and situation. Affect is appropriate.  Speech is appropriate and clear.     APPEARANCE: Patient resting comfortably in no acute distress.  Patient is clean and well groomed.    SKIN: The skin is warm and dry; color consistent with ethnicity.  Patient has normal skin turgor and moist mucus membranes.  Skin intact; no breakdown or bruising noted.     MUSCULOSKELETAL: Patient moving upper and lower extremities without difficulty.  Denies weakness.  Left BKA noted.    RESPIRATORY: Airway is open and patent. Respirations spontaneous, even, easy, and non-labored.  Patient has a normal effort and rate.  No accessory muscle use noted. Denies cough.     CARDIAC:  Normal rhythm and rate noted; pt is hypertensive.  No peripheral edema noted. No complaints of chest pain.      ABDOMEN: Soft and non tender to palpation.  No distention noted. Denies nausea at this time.    NEUROLOGIC: Eyes open spontaneously.  Behavior appropriate to situation.  Follows commands; facial expression symmetrical.  Purposeful motor response noted; normal sensation in all extremities.

## 2018-08-15 NOTE — ED NOTES
Pt resting quietly on stretcher; remains on continuous cardiac and pulse ox monitoring with non-invasive blood pressure to cycle every 15 minutes. Significant improvement in blood pressure noted; NSR.  Pt without vomiting since arrival to ER; denies nausea. Bed locked in lowest position; side rails up and locked x 2; call light, bedside table, and personal belongings within reach.  Pt updated on wait for results; denies needs or complaints at this time; will continue to monitor pt.

## 2018-08-15 NOTE — ED PROVIDER NOTES
Encounter Date: 8/15/2018    SCRIBE #1 NOTE: I, Mariam Aparicio, am scribing for, and in the presence of, Dr. Cheek.       History     Chief Complaint   Patient presents with    Emesis     Pt was at [facility] dialysis. Pt began to vomit 1 episodes of brown emesis.      The patient is a 53 y.o. male with comorbidities including:HTN, hyperparathyroidism, chronic diastolic CHF,TB lung latent, L BKA, ICH, diabetes, ESRD on HD that presents to the ED via EMS for evaluation of emesis today. Patient was receiving dialysis when he had an episode of coffee-ground colored emesis. He states he has lost weight and reports black stool x weeks. Denies HA, nausea, CP, SOB, pain anywhere in the body, and wounds. Patient is able to urinate. Denies taking nexium. Patient is noncompliant with medications. Hx of several episodes of GI bleeds. Hx of intraparenchymal hemorrhage and long hx of intractable vomiting.    Spoke with the nurse at [facility] who knows patient well. Apparently, patient's time for his dialysis is between 7:00am-10:45am but he did not arrive until 9:00am, so she only did one hour on him. The nurse had concerns because of significant emesis and vomiting. Patient filled 3 emesis stations with coffee ground material. According to the nurse, he was in the hospital for some time for a GI bleed about 3 months ago.Patient slow to respond, will wait 30-45s to have an answer. Sometimes he seems to be inconsistent. Speaking to the nurse, this is baseline for him.        The history is provided by the patient and medical records.     Review of patient's allergies indicates:   Allergen Reactions    Fosrenol [lanthanum] Nausea And Vomiting     Nausea and vomiting     Past Medical History:   Diagnosis Date    Amputation stump pain 9/10/2013    Aspiration pneumonia 7/27/2015    Asterixis 11/8/2016    C. difficile colitis 8/7/2015    Cholelithiasis without obstruction 8/25/2015    Chronic diastolic heart failure      2-23-17   1 - Low normal to mildly depressed left ventricular systolic function (EF 50-55%).    2 - Right ventricular enlargement with mildly depressed systolic function.    3 - Left ventricular diastolic dysfunction.    4 - Right atrial enlargement.    5 - Severe tricuspid regurgitation.    6 - Pulmonary hypertension. The estimated PA systolic pressure is 86 mmHg.    7 - Increased central venous pressure.    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    Metabolic acidosis, IAG, reduced excretion of inorganic acids     Myoclonic jerking 9/20/2016    Secondary hyperparathyroidism (of renal origin)     Secondary pulmonary hypertension 3/23/2017    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    TB lung, latent 08/25/2015    Negative Quantiferon Gold 3-23-17     Past Surgical History:   Procedure Laterality Date    COLONOSCOPY      FOOT AMPUTATION THROUGH METATARSAL      left foot    LEG AMPUTATION THROUGH KNEE  12/18/2013    left BKA    R AVF  9/12/12     Family History   Problem Relation Age of Onset    Early death Mother     Kidney disease Father     Hypertension Father     Heart disease Father     Hyperlipidemia Father     Diabetes Brother     Alcohol abuse Maternal Grandmother     Diabetes Maternal Grandfather     Hypertension Sister     Melanoma Neg Hx      Social History     Tobacco Use    Smoking status: Former Smoker     Packs/day: 1.00     Years: 10.00     Pack years: 10.00    Smokeless tobacco:  Never Used   Substance Use Topics    Alcohol use: No    Drug use: No     Review of Systems   Constitutional: Negative for fever.        (+)weight loss   HENT: Negative for trouble swallowing.    Respiratory: Negative for shortness of breath.    Cardiovascular: Negative for chest pain.   Gastrointestinal: Positive for vomiting. Negative for abdominal pain.        (+)black stool   Genitourinary: Negative for flank pain.   Musculoskeletal: Negative for back pain.   Skin: Negative for rash.   Neurological: Negative for headaches.   Psychiatric/Behavioral: Negative for suicidal ideas.       Physical Exam     Initial Vitals [08/15/18 1131]   BP Pulse Resp Temp SpO2   (!) 170/90 88 18 98.8 °F (37.1 °C) 97 %      MAP       --         Physical Exam    Nursing note and vitals reviewed.  Constitutional: He does not have a sickly appearance. He does not appear ill. No distress.   Very slow to respond to questions (this is baseline for this patient)  Patient has persistent hiccups   HENT:   Head: Normocephalic and atraumatic.   Mouth/Throat: Oropharynx is clear and moist.   Eyes: No scleral icterus.   Neck: Normal range of motion. Neck supple. No JVD present.   Cardiovascular: Normal rate, regular rhythm and normal heart sounds.   Pulmonary/Chest: Breath sounds normal. No accessory muscle usage. No tachypnea. No respiratory distress.   Abdominal: He exhibits no distension. There is no tenderness.   Neurological: He is alert.   Skin: Skin is warm.         ED Course   Procedures  Labs Reviewed   CBC W/ AUTO DIFFERENTIAL - Abnormal; Notable for the following components:       Result Value    RDW 14.7 (*)     Platelets 149 (*)     Mono% 15.3 (*)     All other components within normal limits   COMPREHENSIVE METABOLIC PANEL - Abnormal; Notable for the following components:    BUN, Bld 21 (*)     Creatinine 5.9 (*)     Total Protein 9.7 (*)     eGFR if  11.5 (*)     eGFR if non  9.9 (*)     All other  components within normal limits   MAGNESIUM - Abnormal; Notable for the following components:    Magnesium 2.7 (*)     All other components within normal limits   ISTAT PROCEDURE - Abnormal; Notable for the following components:    POC Creatinine 5.9 (*)     POC TCO2 (MEASURED) 34 (*)     POC Ionized Calcium 1.02 (*)     All other components within normal limits   LIPASE   PROTIME-INR   APTT   HEMOGLOBIN   HEMATOCRIT   TYPE & SCREEN   TYPE & SCREEN     EKG Readings: (Independently Interpreted)   Initial Reading: No STEMI. Rhythm: Normal Sinus Rhythm. Heart Rate: 88 bpm.       Imaging Results    None          Medical Decision Making:   History:   Old Medical Records: I decided to obtain old medical records.  Independently Interpreted Test(s):   I have ordered and independently interpreted EKG Reading(s) - see prior notes  Clinical Tests:   Lab Tests: Ordered and Reviewed  Medical Tests: Reviewed and Ordered            Scribe Attestation:   Scribe #1: I performed the above scribed service and the documentation accurately describes the services I performed. I attest to the accuracy of the note.    Attending Attestation:             Attending ED Notes:   Dialysis patient sent to this ED because of concern for possible GI bleed patient apparently had been vomiting while being dialyzed and apparently this was coffee-ground material as per the nurse there patient evaluated here patient has persistent hiccups also patient is very slightly reduced by that will answer questions correctly apparently this is from a previous intracerebral injury in the past patient evaluated in the ED did not find at drop in the hematocrit hemoglobin patient was observed and the patient was subsequently discharged             Clinical Impression:   The primary encounter diagnosis was Dialysis patient. A diagnosis of Weak was also pertinent to this visit.      Disposition:   Disposition: Discharged  Condition: Stable                        Nando  ALMA Cheek MD  08/19/18 8022

## 2018-08-15 NOTE — ED NOTES
Pt placed on continuous cardiac and pulse ox monitoring with non-invasive blood pressure to cycle every 15 minutes.  NSR noted; pt is hypertensive.  Bed locked in lowest position; side rails up and locked x 2; call light and personal belongings within reach; will continue to monitor pt.

## 2018-08-22 ENCOUNTER — HOSPITAL ENCOUNTER (INPATIENT)
Facility: HOSPITAL | Age: 53
LOS: 2 days | Discharge: HOME | DRG: 377 | End: 2018-08-24
Attending: EMERGENCY MEDICINE | Admitting: EMERGENCY MEDICINE
Payer: MEDICARE

## 2018-08-22 DIAGNOSIS — Z99.2 DIALYSIS PATIENT: ICD-10-CM

## 2018-08-22 DIAGNOSIS — N18.6 ESRD ON HEMODIALYSIS: Chronic | ICD-10-CM

## 2018-08-22 DIAGNOSIS — Z99.2 ESRD ON HEMODIALYSIS: Chronic | ICD-10-CM

## 2018-08-22 DIAGNOSIS — E86.0 DEHYDRATION: ICD-10-CM

## 2018-08-22 DIAGNOSIS — R11.10 VOMITING: ICD-10-CM

## 2018-08-22 DIAGNOSIS — I95.1 ORTHOSTASIS: ICD-10-CM

## 2018-08-22 DIAGNOSIS — K29.60 EROSIVE GASTRITIS: ICD-10-CM

## 2018-08-22 DIAGNOSIS — K92.2 UPPER GI BLEED: Primary | ICD-10-CM

## 2018-08-22 DIAGNOSIS — R10.9 ABDOMINAL PAIN: ICD-10-CM

## 2018-08-22 LAB
ABO + RH BLD: NORMAL
ALBUMIN SERPL BCP-MCNC: 3.9 G/DL
ALP SERPL-CCNC: 119 U/L
ALT SERPL W/O P-5'-P-CCNC: 18 U/L
AMYLASE SERPL-CCNC: 149 U/L
ANION GAP SERPL CALC-SCNC: 19 MMOL/L
APTT BLDCRRT: 23.1 SEC
AST SERPL-CCNC: 28 U/L
BASOPHILS # BLD AUTO: 0.03 K/UL
BASOPHILS # BLD AUTO: 0.05 K/UL
BASOPHILS NFR BLD: 0.5 %
BASOPHILS NFR BLD: 0.8 %
BILIRUB SERPL-MCNC: 0.6 MG/DL
BLD GP AB SCN CELLS X3 SERPL QL: NORMAL
BUN SERPL-MCNC: 37 MG/DL
CALCIUM SERPL-MCNC: 11 MG/DL
CHLORIDE SERPL-SCNC: 91 MMOL/L
CO2 SERPL-SCNC: 35 MMOL/L
CREAT SERPL-MCNC: 8.8 MG/DL
DIFFERENTIAL METHOD: ABNORMAL
DIFFERENTIAL METHOD: NORMAL
EOSINOPHIL # BLD AUTO: 0.2 K/UL
EOSINOPHIL # BLD AUTO: 0.3 K/UL
EOSINOPHIL # BLD AUTO: 0.3 K/UL
EOSINOPHIL # BLD AUTO: 0.4 K/UL
EOSINOPHIL NFR BLD: 3.5 %
EOSINOPHIL NFR BLD: 5.1 %
EOSINOPHIL NFR BLD: 5.4 %
EOSINOPHIL NFR BLD: 5.7 %
ERYTHROCYTE [DISTWIDTH] IN BLOOD BY AUTOMATED COUNT: 14.4 %
ERYTHROCYTE [DISTWIDTH] IN BLOOD BY AUTOMATED COUNT: 14.5 %
ERYTHROCYTE [DISTWIDTH] IN BLOOD BY AUTOMATED COUNT: 14.6 %
ERYTHROCYTE [DISTWIDTH] IN BLOOD BY AUTOMATED COUNT: 14.7 %
EST. GFR  (AFRICAN AMERICAN): 7.1 ML/MIN/1.73 M^2
EST. GFR  (NON AFRICAN AMERICAN): 6.1 ML/MIN/1.73 M^2
ESTIMATED AVG GLUCOSE: 82 MG/DL
GLUCOSE SERPL-MCNC: 107 MG/DL
HBA1C MFR BLD HPLC: 4.5 %
HCT VFR BLD AUTO: 39.4 %
HCT VFR BLD AUTO: 40.1 %
HCT VFR BLD AUTO: 40.2 %
HCT VFR BLD AUTO: 43.7 %
HGB BLD-MCNC: 13.2 G/DL
HGB BLD-MCNC: 13.3 G/DL
HGB BLD-MCNC: 13.6 G/DL
HGB BLD-MCNC: 14.7 G/DL
IMM GRANULOCYTES # BLD AUTO: 0.01 K/UL
IMM GRANULOCYTES # BLD AUTO: 0.01 K/UL
IMM GRANULOCYTES # BLD AUTO: 0.02 K/UL
IMM GRANULOCYTES # BLD AUTO: 0.02 K/UL
IMM GRANULOCYTES NFR BLD AUTO: 0.2 %
IMM GRANULOCYTES NFR BLD AUTO: 0.2 %
IMM GRANULOCYTES NFR BLD AUTO: 0.3 %
IMM GRANULOCYTES NFR BLD AUTO: 0.3 %
INR PPP: 1
LIPASE SERPL-CCNC: 32 U/L
LYMPHOCYTES # BLD AUTO: 1.6 K/UL
LYMPHOCYTES # BLD AUTO: 1.6 K/UL
LYMPHOCYTES # BLD AUTO: 1.9 K/UL
LYMPHOCYTES # BLD AUTO: 1.9 K/UL
LYMPHOCYTES NFR BLD: 24.2 %
LYMPHOCYTES NFR BLD: 24.5 %
LYMPHOCYTES NFR BLD: 32.2 %
LYMPHOCYTES NFR BLD: 32.4 %
MAGNESIUM SERPL-MCNC: 2.5 MG/DL
MCH RBC QN AUTO: 30.1 PG
MCH RBC QN AUTO: 30.3 PG
MCHC RBC AUTO-ENTMCNC: 32.9 G/DL
MCHC RBC AUTO-ENTMCNC: 33.6 G/DL
MCHC RBC AUTO-ENTMCNC: 33.8 G/DL
MCHC RBC AUTO-ENTMCNC: 33.8 G/DL
MCV RBC AUTO: 89 FL
MCV RBC AUTO: 89 FL
MCV RBC AUTO: 90 FL
MCV RBC AUTO: 91 FL
MONOCYTES # BLD AUTO: 0.7 K/UL
MONOCYTES # BLD AUTO: 0.8 K/UL
MONOCYTES # BLD AUTO: 0.8 K/UL
MONOCYTES # BLD AUTO: 1 K/UL
MONOCYTES NFR BLD: 11.6 %
MONOCYTES NFR BLD: 12 %
MONOCYTES NFR BLD: 12.3 %
MONOCYTES NFR BLD: 16.4 %
NEUTROPHILS # BLD AUTO: 2.7 K/UL
NEUTROPHILS # BLD AUTO: 2.8 K/UL
NEUTROPHILS # BLD AUTO: 3.8 K/UL
NEUTROPHILS # BLD AUTO: 4 K/UL
NEUTROPHILS NFR BLD: 45.3 %
NEUTROPHILS NFR BLD: 49.5 %
NEUTROPHILS NFR BLD: 56.7 %
NEUTROPHILS NFR BLD: 59.9 %
NRBC BLD-RTO: 0 /100 WBC
PLATELET # BLD AUTO: 125 K/UL
PLATELET # BLD AUTO: 145 K/UL
PLATELET # BLD AUTO: 160 K/UL
PLATELET # BLD AUTO: 165 K/UL
PMV BLD AUTO: 11.3 FL
PMV BLD AUTO: 11.8 FL
PMV BLD AUTO: 11.9 FL
PMV BLD AUTO: 11.9 FL
POTASSIUM SERPL-SCNC: 3.2 MMOL/L
PROT SERPL-MCNC: 9.4 G/DL
PROTHROMBIN TIME: 10.7 SEC
RBC # BLD AUTO: 4.39 M/UL
RBC # BLD AUTO: 4.39 M/UL
RBC # BLD AUTO: 4.52 M/UL
RBC # BLD AUTO: 4.89 M/UL
SODIUM SERPL-SCNC: 145 MMOL/L
TROPONIN I SERPL DL<=0.01 NG/ML-MCNC: 0.27 NG/ML
WBC # BLD AUTO: 5.71 K/UL
WBC # BLD AUTO: 5.9 K/UL
WBC # BLD AUTO: 6.58 K/UL
WBC # BLD AUTO: 6.65 K/UL

## 2018-08-22 PROCEDURE — 25000003 PHARM REV CODE 250: Performed by: HOSPITALIST

## 2018-08-22 PROCEDURE — C9113 INJ PANTOPRAZOLE SODIUM, VIA: HCPCS | Performed by: EMERGENCY MEDICINE

## 2018-08-22 PROCEDURE — 80053 COMPREHEN METABOLIC PANEL: CPT

## 2018-08-22 PROCEDURE — 99223 1ST HOSP IP/OBS HIGH 75: CPT | Mod: ,,, | Performed by: HOSPITALIST

## 2018-08-22 PROCEDURE — 82150 ASSAY OF AMYLASE: CPT

## 2018-08-22 PROCEDURE — 83036 HEMOGLOBIN GLYCOSYLATED A1C: CPT

## 2018-08-22 PROCEDURE — 96365 THER/PROPH/DIAG IV INF INIT: CPT

## 2018-08-22 PROCEDURE — 96361 HYDRATE IV INFUSION ADD-ON: CPT

## 2018-08-22 PROCEDURE — 96375 TX/PRO/DX INJ NEW DRUG ADDON: CPT

## 2018-08-22 PROCEDURE — 93005 ELECTROCARDIOGRAM TRACING: CPT

## 2018-08-22 PROCEDURE — 83735 ASSAY OF MAGNESIUM: CPT

## 2018-08-22 PROCEDURE — 85730 THROMBOPLASTIN TIME PARTIAL: CPT

## 2018-08-22 PROCEDURE — 63600175 PHARM REV CODE 636 W HCPCS: Performed by: HOSPITALIST

## 2018-08-22 PROCEDURE — 25000003 PHARM REV CODE 250: Performed by: NURSE PRACTITIONER

## 2018-08-22 PROCEDURE — 11000001 HC ACUTE MED/SURG PRIVATE ROOM

## 2018-08-22 PROCEDURE — 83690 ASSAY OF LIPASE: CPT

## 2018-08-22 PROCEDURE — C9113 INJ PANTOPRAZOLE SODIUM, VIA: HCPCS | Performed by: HOSPITALIST

## 2018-08-22 PROCEDURE — 99285 EMERGENCY DEPT VISIT HI MDM: CPT

## 2018-08-22 PROCEDURE — 36415 COLL VENOUS BLD VENIPUNCTURE: CPT

## 2018-08-22 PROCEDURE — 63600175 PHARM REV CODE 636 W HCPCS: Performed by: EMERGENCY MEDICINE

## 2018-08-22 PROCEDURE — 85610 PROTHROMBIN TIME: CPT

## 2018-08-22 PROCEDURE — 90935 HEMODIALYSIS ONE EVALUATION: CPT | Mod: GC,,, | Performed by: NURSE PRACTITIONER

## 2018-08-22 PROCEDURE — 90935 HEMODIALYSIS ONE EVALUATION: CPT

## 2018-08-22 PROCEDURE — 85025 COMPLETE CBC W/AUTO DIFF WBC: CPT

## 2018-08-22 PROCEDURE — 84484 ASSAY OF TROPONIN QUANT: CPT

## 2018-08-22 PROCEDURE — 93010 ELECTROCARDIOGRAM REPORT: CPT | Mod: ,,, | Performed by: INTERNAL MEDICINE

## 2018-08-22 PROCEDURE — 99222 1ST HOSP IP/OBS MODERATE 55: CPT | Mod: 25,,, | Performed by: NURSE PRACTITIONER

## 2018-08-22 PROCEDURE — 99291 CRITICAL CARE FIRST HOUR: CPT | Mod: ,,, | Performed by: EMERGENCY MEDICINE

## 2018-08-22 PROCEDURE — 25000003 PHARM REV CODE 250: Performed by: EMERGENCY MEDICINE

## 2018-08-22 PROCEDURE — 86901 BLOOD TYPING SEROLOGIC RH(D): CPT

## 2018-08-22 PROCEDURE — 99223 1ST HOSP IP/OBS HIGH 75: CPT | Mod: GC,,, | Performed by: INTERNAL MEDICINE

## 2018-08-22 RX ORDER — SODIUM CHLORIDE 9 MG/ML
INJECTION, SOLUTION INTRAVENOUS ONCE
Status: COMPLETED | OUTPATIENT
Start: 2018-08-22 | End: 2018-08-22

## 2018-08-22 RX ORDER — IBUPROFEN 200 MG
16 TABLET ORAL
Status: DISCONTINUED | OUTPATIENT
Start: 2018-08-22 | End: 2018-08-24 | Stop reason: HOSPADM

## 2018-08-22 RX ORDER — LABETALOL HYDROCHLORIDE 5 MG/ML
10 INJECTION, SOLUTION INTRAVENOUS ONCE
Status: COMPLETED | OUTPATIENT
Start: 2018-08-22 | End: 2018-08-22

## 2018-08-22 RX ORDER — HYDRALAZINE HYDROCHLORIDE 20 MG/ML
10 INJECTION INTRAMUSCULAR; INTRAVENOUS EVERY 6 HOURS PRN
Status: DISCONTINUED | OUTPATIENT
Start: 2018-08-22 | End: 2018-08-22

## 2018-08-22 RX ORDER — ACETAMINOPHEN 325 MG/1
650 TABLET ORAL EVERY 6 HOURS PRN
Status: DISCONTINUED | OUTPATIENT
Start: 2018-08-22 | End: 2018-08-24 | Stop reason: HOSPADM

## 2018-08-22 RX ORDER — AMLODIPINE BESYLATE 10 MG/1
10 TABLET ORAL DAILY
Status: DISCONTINUED | OUTPATIENT
Start: 2018-08-22 | End: 2018-08-22

## 2018-08-22 RX ORDER — IBUPROFEN 200 MG
24 TABLET ORAL
Status: DISCONTINUED | OUTPATIENT
Start: 2018-08-22 | End: 2018-08-24 | Stop reason: HOSPADM

## 2018-08-22 RX ORDER — CARVEDILOL 12.5 MG/1
25 TABLET ORAL 2 TIMES DAILY WITH MEALS
Status: DISCONTINUED | OUTPATIENT
Start: 2018-08-22 | End: 2018-08-22

## 2018-08-22 RX ORDER — PANTOPRAZOLE SODIUM 40 MG/10ML
80 INJECTION, POWDER, LYOPHILIZED, FOR SOLUTION INTRAVENOUS
Status: DISCONTINUED | OUTPATIENT
Start: 2018-08-22 | End: 2018-08-22

## 2018-08-22 RX ORDER — HYDRALAZINE HYDROCHLORIDE 25 MG/1
50 TABLET, FILM COATED ORAL EVERY 8 HOURS
Status: DISCONTINUED | OUTPATIENT
Start: 2018-08-22 | End: 2018-08-22

## 2018-08-22 RX ORDER — POTASSIUM CHLORIDE 20 MEQ/15ML
30 SOLUTION ORAL ONCE
Status: COMPLETED | OUTPATIENT
Start: 2018-08-22 | End: 2018-08-22

## 2018-08-22 RX ORDER — ONDANSETRON 2 MG/ML
4 INJECTION INTRAMUSCULAR; INTRAVENOUS EVERY 8 HOURS PRN
Status: DISCONTINUED | OUTPATIENT
Start: 2018-08-22 | End: 2018-08-24 | Stop reason: HOSPADM

## 2018-08-22 RX ORDER — PANTOPRAZOLE SODIUM 40 MG/10ML
40 INJECTION, POWDER, LYOPHILIZED, FOR SOLUTION INTRAVENOUS 2 TIMES DAILY
Status: DISCONTINUED | OUTPATIENT
Start: 2018-08-22 | End: 2018-08-22

## 2018-08-22 RX ORDER — SODIUM CHLORIDE 0.9 % (FLUSH) 0.9 %
5 SYRINGE (ML) INJECTION
Status: DISCONTINUED | OUTPATIENT
Start: 2018-08-22 | End: 2018-08-24 | Stop reason: HOSPADM

## 2018-08-22 RX ORDER — GLUCAGON 1 MG
1 KIT INJECTION
Status: DISCONTINUED | OUTPATIENT
Start: 2018-08-22 | End: 2018-08-24 | Stop reason: HOSPADM

## 2018-08-22 RX ORDER — ATORVASTATIN CALCIUM 20 MG/1
80 TABLET, FILM COATED ORAL NIGHTLY
Status: DISCONTINUED | OUTPATIENT
Start: 2018-08-22 | End: 2018-08-24 | Stop reason: HOSPADM

## 2018-08-22 RX ADMIN — LABETALOL HYDROCHLORIDE 10 MG: 5 INJECTION, SOLUTION INTRAVENOUS at 09:08

## 2018-08-22 RX ADMIN — ATORVASTATIN CALCIUM 80 MG: 20 TABLET, FILM COATED ORAL at 10:08

## 2018-08-22 RX ADMIN — DEXTROSE 40 MG: 50 INJECTION, SOLUTION INTRAVENOUS at 10:08

## 2018-08-22 RX ADMIN — PROMETHAZINE HYDROCHLORIDE 25 MG: 25 INJECTION INTRAMUSCULAR; INTRAVENOUS at 02:08

## 2018-08-22 RX ADMIN — ONDANSETRON 4 MG: 2 INJECTION INTRAMUSCULAR; INTRAVENOUS at 08:08

## 2018-08-22 RX ADMIN — POTASSIUM CHLORIDE 30 MEQ: 20 SOLUTION ORAL at 09:08

## 2018-08-22 RX ADMIN — DEXTROSE 40 MG: 50 INJECTION, SOLUTION INTRAVENOUS at 09:08

## 2018-08-22 RX ADMIN — DEXTROSE 80 MG: 50 INJECTION, SOLUTION INTRAVENOUS at 03:08

## 2018-08-22 RX ADMIN — SODIUM CHLORIDE 1000 ML: 0.9 INJECTION, SOLUTION INTRAVENOUS at 01:08

## 2018-08-22 RX ADMIN — ONDANSETRON 4 MG: 2 INJECTION INTRAMUSCULAR; INTRAVENOUS at 10:08

## 2018-08-22 RX ADMIN — SODIUM CHLORIDE: 0.9 INJECTION, SOLUTION INTRAVENOUS at 12:08

## 2018-08-22 NOTE — HPI
Mr Retana is a 53 year old man with history of ESRD on HD M/W/F, CHF, ICH, multiple prior evaluations for concern for UGIB (last EGD 5/22 with esophagitis), presenting to the ED with 2 days of LLQ abdominal pain, nausea and non-bloody vomitus. GI was consulted due to concern for hematemesis.    Patient reports that he was well until ~2 days ago when he developped LLQ (1/10) abdominal pain, nausea and vomitus. He notes that the vomitus was food containing and non-bloody, but remarks it may have appeared coffee-grind. He notes that he has not had any issues with PO intake or dysphagia or early satiety. He denies any changes in bowel habitus, last BM yesterday was brown, no melena. Denies any NSAID or anticoagulant use. In the ED an NGT was placed (KUB dilated bowel loops) and patient noted improvement in symptoms thereafter.    Per hugh review, the NGT led to 600ml of output which was occult +ve. Hgb on admission was 14.5 which trended to 13.6 (prior value 14 in 8/15).    Past Endo Hx  EGD (5/22) Dr. Sparks. Indication: hematemesis  - Grade D reflux esophagitis    EGD (3/16/18) Dr. De La Paz. Indication: coffee-grind emesis  - gastritis. H. Pylori negative.  - 6mm gastric polyp. 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 Galicia. Indication: UGIB  - Grade B reflux esophagitis  - gastric polyp

## 2018-08-22 NOTE — MEDICAL/APP STUDENT
Hospital Medicine  History and Physical Exam    [place] [facility] [place] Lul [place]  Admit Date: 8/22/2018  JUAN 8/24/2018  Principal Problem:  Upper GI bleed   Patient information was obtained from patient and ER records.   Primary care Physician: Yvette Zelaya NP  Code status:     HPI:   Vaughn Retana is a 53 y.o. male with PMH of HD, CHF, ICH x2, ESRD on HD - M,W,F who presents to the ED with a complaint of abdominal pain and Nausea/vomting starting 2 days ago. He states that he has had multiple episodes of vomiting since the onset. He states he has been vomiting what he thinks is blood, appears orange. He reports associated abdomina discomfort which is constant described as sharp and radiates into the back. The patient denies diarrhea, melanous or bloody stools, fever, chest pain, headache, blurry vision, rhinorrhea. Patient has 7/10 abdominal pain that is sharp and radiates to back. Patient has had back pain that he takes motrin for.     Patient with orthostasis, hold HTN medications. 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     Past Medical History: Patient has a past medical history of Amputation stump pain (9/10/2013), Aspiration pneumonia (7/27/2015), Asterixis (11/8/2016), C. 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    Past Surgical History: Patient has a past surgical history that includes R AVF (9/12/12); Leg amputation through knee (12/18/2013); Foot amputation through metatarsal; Colonoscopy; EGD (ESOPHAGOGASTRODUODENOSCOPY) (N/A, 6/12/2018); ESOPHAGOGASTRODUODENOSCOPY (EGD) (N/A, 5/22/2018); ESOPHAGOGASTRODUODENOSCOPY (EGD) (N/A, 3/16/2018); ESOPHAGOGASTRODUODENOSCOPY (EGD) (N/A, 3/8/2018); ESOPHAGOGASTRODUODENOSCOPY (EGD) (N/A, 4/4/2017); COLONOSCOPY (N/A, 4/4/2017); ESOPHAGOGASTRODUODENOSCOPY (EGD) (N/A, 10/17/2014); FISTULOGRAM (Right, 9/18/2014); and AMPUTATION, BELOW KNEE (Left, 12/18/2013).    Social History: Patient reports that he has quit smoking. He has a 10.00 pack-year smoking history. he has never used smokeless tobacco. He reports that he does not drink alcohol or use drugs.    Family History: family history includes Alcohol abuse in his maternal grandmother; Diabetes in his brother and maternal grandfather; Early death in his mother; Heart disease in his father; Hyperlipidemia in his father; Hypertension in his father and sister; Kidney disease in his father.    Medications:   Prior to Admission medications    Medication Sig Start Date End Date Taking? Authorizing Provider   amLODIPine (NORVASC) 10 MG tablet Take 10 mg by mouth once daily.   Yes Historical Provider, MD   atorvastatin (LIPITOR) 80 MG tablet Take 80 mg by mouth every evening.   Yes Historical Provider, MD   carvedilol (COREG) 25 MG tablet Take 25 mg by mouth 2 (two) times daily with meals.   Yes Historical Provider, MD   chlorproMAZINE (THORAZINE) 25 MG tablet Take 25 mg by mouth every 12 (twelve) hours as needed.   Yes Historical Provider, MD   famotidine (PEPCID) 40 MG tablet Take 40 mg by mouth daily as needed for Heartburn.   Yes Historical Provider, MD   lisinopril (PRINIVIL,ZESTRIL) 40 MG tablet Take 1  tablet (40 mg total) by mouth every evening.  Patient taking differently: Take 40 mg by mouth once daily.  10/25/17  Yes Yvette Zelaya NP   ondansetron (ZOFRAN) 8 MG tablet TAKE 1 TABLET BY MOUTH EVERY 8 HOURS AS NEEDED FOR NAUSEA 5/24/18  Yes Deedee Ramos MD   pantoprazole (PROTONIX) 40 MG tablet Take 1 tablet (40 mg total) by mouth 2 (two) times daily before meals. 3/17/18 3/17/19 Yes Rancho Carvalho PA-C   hydrALAZINE (APRESOLINE) 50 MG tablet Take 50 mg by mouth every 8 (eight) hours.    Historical Provider, MD       Allergies: Patient is allergic to fosrenol [lanthanum].    ROS    Constitutional: Negative for activity change, appetite change, chills and fever.   HENT: Negative for sore throat and trouble swallowing.    Eyes: Negative for visual disturbance.   Respiratory: Negative for chest tightness and shortness of breath.    Cardiovascular: Negative for chest pain.   Gastrointestinal: Positive for abdominal pain and vomiting. Negative for abdominal distention, anal bleeding, blood in stool, constipation and diarrhea. LLQ pain;  Genitourinary: Negative for dysuria and hematuria. Little urine made  Musculoskeletal: Negative for arthralgias and myalgias.   Skin: Negative for rash.   Neurological: Negative for dizziness.   Psychiatric/Behavioral: Negative for agitation and confusion.         PEx  Temp:  [97.7 °F (36.5 °C)-98.4 °F (36.9 °C)]   Pulse:  []   Resp:  [18-22]   BP: (144-215)/()   SpO2:  [100 %]   Body mass index is 21.63 kg/m².     Constitutional: He is oriented to person, place, and time. No distress.   Lying in bed, no distress. NGT in place with greenish fluid, non-bloody. Vomiting bag at bedside without vomit. Chronically ill appearing.   Cardiovascular: Normal rate, regular rhythm and normal heart sounds.   No murmur heard.  Pulmonary/Chest: Effort normal and breath sounds normal. No respiratory distress.   Abdominal: Soft. Bowel sounds are normal. He exhibits no  distension.. There is no rebound. LLQ pain on palpation.    Musculoskeletal: He exhibits no tenderness.   Neurological: He is alert and oriented to person, place, and time.          Intake/Output Summary (Last 24 hours) at 8/22/2018 1412  Last data filed at 8/22/2018 1257  Gross per 24 hour   Intake 1250 ml   Output --   Net 1250 ml     Recent Labs   Lab  08/22/18   0142  08/22/18   0418  08/22/18   1252   WBC  6.65  6.58  5.71   HGB  14.7  13.6*  13.3*   HCT  43.7  40.2  39.4*   PLT  165  160  145*     Recent Labs   Lab  08/22/18   0142   NA  145   K  3.2*   CL  91*   CO2  35*   BUN  37*   CREATININE  8.8*   GLU  107   CALCIUM  11.0*   MG  2.5   LIPASE  32   AMYLASE  149*     Recent Labs   Lab  08/22/18   0142   ALKPHOS  119   ALT  18   AST  28   ALBUMIN  3.9   PROT  9.4*   BILITOT  0.6   INR  1.0      No results for input(s): POCTGLUCOSE in the last 168 hours.  Recent Labs      08/22/18   0142   TROPONINI  0.266*       No results for input(s): LACTATE in the last 72 hours.     Active Hospital Problems    Diagnosis  POA    *Upper GI bleed [K92.2]  Yes    Orthostasis [I95.1]  Yes    Erosive gastritis [K29.60]  Yes    Renovascular hypertension [I15.0]  Yes     Chronic    Hypokalemia [E87.6]  Yes     Chronic    History of Intracerebral Hemorrhage: L BG 5/2013; R BG 9/2016; R BG 11/2016; L caudate head 2/2017 [Z86.79]  Not Applicable     Chronic    History of left below knee amputation 12/18/13 [Z89.512]  Not Applicable     Chronic    ESRD on hemodialysis [N18.6, Z99.2]  Not Applicable     Chronic     MWF at [facility]      Dyslipidemia [E78.5]  Yes     Chronic    Anemia in chronic kidney disease [N18.9, D63.1]  Yes     Chronic    Secondary hyperparathyroidism of renal origin [N25.81]  Yes     Chronic      Resolved Hospital Problems   No resolved problems to display.       Overview  Mr. Retana is a 51 yo AAM with HTN, DM2, HFrEF, Esophagitis and gastritis, with Hx of ICH, and L BKA, on iHD on MWF who  "presented to the hospital on 08/22 with chief compaint of N/V and abdominal pain.  He was recently sent to the hospital last week after he had what was described as "coffee-ground emesis" at his outpatient dialysis unit last week.  He symptoms improved w/o signs of active GI bleed, and he was sent home.  He has been followed by GI during his past admissions with EGDs performed on 05/22, 03/8 and 03/16 all revealing gastritis and esophagitis.  He reports these symptoms have been present x 3 days, with the inability to take his medications at home or eat/drink.  He reports compliance with his dialysis prescription, but does report that he sometimes ends his dialysis treatment due to nausea and vomiting.  In the ED, he underwent abdominal imaging which revealed dilated bowels and NG tube was placed with 600 ml of contents removed which tested positive for heme.  He was also found to be hypertensive with BP >200/100's and was given IV labetalol.  Labs interpretation appears to be hemo-concentrated with H/H of 14.7/43.7, unlikely to be related to JONAS dosing at his dialysis unit.  Chemistries appear to be on the dry side as his sodium is high normal, typically not seen in HD patients, along with new hypercalcemia and a metabolic alkalosis.  He was found to have orthostatic changes with vitals and was administered 1L of NS.  GI evaluated patient at bedside, and they are currently planning on medical treatment with IV Protonix w/o further imaging studies.  Nephrology was consulted for ESRD management.    Assessment and Plan for Problems addressed today:    Coffee ground emesis  · GI states patient may be hemoconcentrated given hgb of 14 while ESRD, or excess dosing of EPO  · Conservative management and ok to advance to full liquid diet  · Protonix BID IV    Orthostatic HTN  · Persistent after 1L bolus  · HD and hydrate    HTN urgency  · telemonitoring  · Stable, but stopped HTN medication 2/2 orthostatic     CHR " diastolic  · Monitor with HTN    Hepatitis C  · Has not been treated    CVA  · No residual effects    Hyperlipidemia  · On a statin    Hypokalemia  · 3.2 (8/22) - replaced      ESRD on hemodialysis     ESRD on dialysis m-w-f  - last dialysis session Monday   - lungs ctab, xr without pulmonary edema. Not currently fluid overloaded on exam.   - K 3.2           DVT PPx:     Provider    I personally scribed for Gavino Cordoba MD on 08/22/2018 at 8:39 PM. Electronically signed by blessing Bowling III on 08/22/2018 at 8:39 PM

## 2018-08-22 NOTE — CONSULTS
"[facility]  Critical Care Medicine  Consult Note    Patient Name: Vaughn Retana  MRN: [id number]  Admission Date: 8/22/2018  Hospital Length of Stay: 0 days  Code Status: Prior  Attending Physician: Gavino Cordoba MD   Primary Care Provider: Yvette Zelaya NP   Principal Problem: Gi bleed   Consults  Subjective:     HPI:    52yo M h/o Hematemesis (EGD 5/2018 with gastritis, no varices), cholelithiasis, HFrEF (EF 50%); chronic pain, HTN, ESRD on M-W-F dialysis presenting with 3 days of hematemesis. The pt is noting epigastric abdominal pain, n/v. He states he has had multiple episodes of "orange" colored vomiting but is not sure if it is blood. The patient denies bloody stools, dark tarry stools. Per chart review the patient had an EGD for similar symptoms 5/2018 and was found to have gastritis. No ulcers at that time. No varices on EGD. In the ED the patient was hypertensive with BP 190s with a HR 89. The pt was orthostatic with a  and  systolic. The pt received 1L NS with improvement in his symptoms. Hgb/HCT 13/40. The pt had 600cc of output while in the ed that was heme occult positive. XR shows dilated bowel loops. The patient is passing gas while he is here and had a normal bowel movement today. GI evaluated the patient and recommended floor vs ICU admission. They are planning to scope the patient during this admission.            Hospital/ICU Course:  8/22/2018: Patient evaluated in the ED. The patient is HDS without active NGT output. On exam the pt is TTP over the epigastric region and the LLQ. There is no rebound. H/H 13/40. The patient had recent EGD on 5/2018 showing gastritis for a similar presentation. No varices or PUD on that exam. The patient has been seen by GI. The patient does not currently need ICU level care. Hospital medicine consulted per ED.     Past Medical History:   Diagnosis Date    Amputation stump pain 9/10/2013    Aspiration pneumonia " 7/27/2015    Asterixis 11/8/2016    C. difficile colitis 8/7/2015    Cholelithiasis without obstruction 8/25/2015    Chronic diastolic heart failure     2-23-17   1 - Low normal to mildly depressed left ventricular systolic function (EF 50-55%).    2 - Right ventricular enlargement with mildly depressed systolic function.    3 - Left ventricular diastolic dysfunction.    4 - Right atrial enlargement.    5 - Severe tricuspid regurgitation.    6 - Pulmonary hypertension. The estimated PA systolic pressure is 86 mmHg.    7 - Increased central venous pressure.    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    Metabolic acidosis, IAG, reduced excretion of inorganic acids     Myoclonic jerking 9/20/2016    Secondary hyperparathyroidism (of renal origin)     Secondary pulmonary hypertension 3/23/2017    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    TB lung, latent 08/25/2015    Negative Quantiferon Gold 3-23-17       Past Surgical History:   Procedure Laterality Date    COLONOSCOPY      FOOT AMPUTATION THROUGH METATARSAL      left foot    LEG AMPUTATION THROUGH KNEE  12/18/2013    left BKA    R AVF  9/12/12       Review of patient's allergies indicates:   Allergen Reactions    Fosrenol [lanthanum] Nausea And Vomiting     Nausea and vomiting       Family History     Problem Relation (Age of Onset)    Alcohol abuse Maternal Grandmother    Diabetes Brother, Maternal Grandfather    Early death Mother    Heart disease  Father    Hyperlipidemia Father    Hypertension Father, Sister    Kidney disease Father        Tobacco Use    Smoking status: Former Smoker     Packs/day: 1.00     Years: 10.00     Pack years: 10.00    Smokeless tobacco: Never Used   Substance and Sexual Activity    Alcohol use: No    Drug use: No    Sexual activity: Yes     Partners: Female     Birth control/protection: None      Review of Systems   Constitutional: Negative for diaphoresis, fatigue and fever.   HENT: Negative for facial swelling, rhinorrhea and sore throat.    Eyes: Negative for pain.   Respiratory: Negative for cough and shortness of breath.    Cardiovascular: Negative for chest pain and palpitations.   Gastrointestinal: Positive for abdominal pain, nausea and vomiting. Negative for constipation.   Genitourinary: Negative for dysuria and flank pain.   Skin: Negative for pallor.   Neurological: Negative for speech difficulty and headaches.     Objective:     Vital Signs (Most Recent):  Temp: 98.4 °F (36.9 °C) (08/22/18 0108)  Pulse: 108 (08/22/18 0446)  Resp: (!) 22 (08/22/18 0108)  BP: (!) 144/80 (08/22/18 0446)  SpO2: 100 % (08/22/18 0108) Vital Signs (24h Range):  Temp:  [98.4 °F (36.9 °C)] 98.4 °F (36.9 °C)  Pulse:  [] 108  Resp:  [22] 22  SpO2:  [100 %] 100 %  BP: (144-215)/() 144/80   Weight: 60.8 kg (134 lb 0.6 oz)  Body mass index is 21.63 kg/m².      Intake/Output Summary (Last 24 hours) at 8/22/2018 0642  Last data filed at 8/22/2018 0340  Gross per 24 hour   Intake 1150 ml   Output --   Net 1150 ml       Physical Exam  Nursing note and vitals reviewed.  Constitutional: He appears well-developed and well-nourished. He appears distressed.   Moderate, vomiting  coffee ground emesis   HENT:   Head: Normocephalic and atraumatic.   Mouth/Throat: Oropharynx is clear and moist.   Eyes: Pupils are equal, round, and reactive to light.   Neck: Normal range of motion. Neck supple.   Cardiovascular: Normal rate, regular rhythm,  normal heart sounds and intact distal pulses.   Pulmonary/Chest: Breath sounds normal. No respiratory distress. He has no wheezes. He has no rhonchi. He has no rales. He exhibits no tenderness.   Abdominal: Soft. He exhibits no distension and no mass. There is no tenderness. There is no rebound and no guarding.   Musculoskeletal: He exhibits no edema or tenderness.   Left BKA   Neurological: He is alert and oriented to person, place, and time.   Skin: Skin is warm and dry. Capillary refill takes less than 2 seconds.   Psychiatric: He has a normal mood and affect. His behavior is normal.          Vents:     Lines/Drains/Airways     Drain                 Hemodialysis AV Fistula Right upper arm -- days         Hemodialysis AV Fistula 03/08/18 1522 Right upper arm 166 days         NG/OG Tube 08/22/18 0246 [place] sump 14 Fr. Left nostril less than 1 day          Peripheral Intravenous Line                 Peripheral IV - Single Lumen 08/22/18 0141 Left Antecubital less than 1 day         Peripheral IV - Single Lumen 08/22/18 0245 Left Forearm less than 1 day              Significant Labs:    CBC/Anemia Profile:  Recent Labs   Lab  08/22/18   0142  08/22/18   0418   WBC  6.65  6.58   HGB  14.7  13.6*   HCT  43.7  40.2   PLT  165  160   MCV  89  89   RDW  14.4  14.5        Chemistries:  Recent Labs   Lab  08/22/18   0142   NA  145   K  3.2*   CL  91*   CO2  35*   BUN  37*   CREATININE  8.8*   CALCIUM  11.0*   ALBUMIN  3.9   PROT  9.4*   BILITOT  0.6   ALKPHOS  119   ALT  18   AST  28   MG  2.5       All pertinent labs within the past 24 hours have been reviewed.    Significant Imaging: I have reviewed all pertinent imaging results/findings within the past 24 hours.    Assessment/Plan:     Renal/   ESRD on hemodialysis    ESRD on dialysis m-w-f  - last dialysis session Monday   - lungs ctab, xr without pulmonary edema. Not currently fluid overloaded on exam.   - K 3.2         Oncology   Anemia in chronic kidney disease     - H/H 13/40 currently           GI   Upper GI bleed    - coffee ground emesis with NGT in place. No current drainage   - Gi evaluated the pt in the ED: EGD   - Start IV PPI BID   - NPO   - serial H/H            54yo M h/o ESRD on dialysis, gastritis, cholelithiasis, HFrEF presenting with hematemesis and epigastric abdominal pain. The patient had coffee ground emesis in the ED. NGT was inserted. Pt was initially orthostatic which corrected with 1L of NS. The patients hgb stable at 13/40. The patient had a recent EGD showing gastritis without varices or ulcerations in May 2018. The patient does have abdominal tenderness in the epigastric region and in the LLQ.  Labs were unremarkable. XR abd shows distended small bowel however patient is non-distended, has active bowel sounds, is passing gas and had regular bowel movements today which is less c/f sbo.      The patient is HDS and NGT output has ceased over the last few hours. The patient has a recent EGD showing gastritis for a similar presentation without varices or ulceration of the stomach. This is very reassuring. Patient is currently stable for the floor with GI on consult.       Thank you for your consult. I will sign off. Please contact us if you have any additional questions.     Bri Hurtado MD  Critical Care Medicine  [facility]

## 2018-08-22 NOTE — ED NOTES
NG advanced 10 cm per MD instruction. Now at 55 at the left nare. Flushed with 430 mL in 30-40 mL increments until clear per MD instruction. Pt tolerating well. Reports feeling better at this time. Total of 750 mL gastric contents returned, including the 430 mL flush.

## 2018-08-22 NOTE — ASSESSMENT & PLAN NOTE
- coffee ground emesis with NGT in place. No current drainage   - Gi evaluated the pt in the ED: EGD   - Start IV PPI BID   - NPO   - serial H/H

## 2018-08-22 NOTE — ASSESSMENT & PLAN NOTE
Mr Retana is a 53 year old man with history of ESRD on HD M/W/F, CHF, ICH, multiple prior evaluations for concern for UGIB (last EGD 5/22 with esophagitis), presenting to the ED with 2 days of LLQ abdominal pain, nausea and non-bloody vomitus. GI was consulted due to concern for hematemesis.    On bedside evaluation patient has NGT in place with green output but no evidence of hematemesis/coffee-grind emesis. Has multiple prior EGD (5/22, 3/16, 3/8) for concern for UGIB with gastritis/esophagitis. On admission Hgb 14.5 -> 13.6 which is far above prior value (14 8/5). BM are brown per patient.    Imp: does not appear patient has an active UGIB at this time    Plan  - may be hemoconcentrated given hgb of 14 while ESRD, though was 14 on 8/15 or could be excessive dosing of epo as outpatient.  - recommended conservative management  - ok to advance to full liquid diet  - continue Protonix BID IV  - trend H&H  - please inform GI team of any changes in clinical status

## 2018-08-22 NOTE — ED PROVIDER NOTES
"Encounter Date: 8/22/2018  CRIBE #1 NOTE: I, Lucho Curry, am scribing for, and in the presence of,  Jose Evans MD. I have scribed the entire note.        History     Chief Complaint   Patient presents with    Back Pain     Pt reports intermittent back lower back pain for a few days. States the pain is getting worse. Reports pain feels like 'stabbing pressure."     Emesis     Pt reports emesis for a few days with increase in beltching. Pt deneis any abd pain or diarrha. Denies fever.      Time patient was seen by the provider: 1:15 AM      The patient is a 53 y.o. male with co-morbidities including: HD, CHF, ICH x2, ESRD on HD - M,W,F who presents to the ED with a complaint of abdominal pain and Nausea/vomtiing starting earlier today. He states that he has had multiple episodes of vomiting since the onset yesterday. He states he has been vomiting what he thinks is blood, appears orange. He reports associated abdomina discomfort which is constant described as sharp and radiates into the back. The patient denies diarrhea, melanous or bloody stools, fever, chest pain, headache, blurry vision, rhinorrhea.            Review of patient's allergies indicates:   Allergen Reactions    Fosrenol [lanthanum] Nausea And Vomiting     Nausea and vomiting     Past Medical History:   Diagnosis Date    Amputation stump pain 9/10/2013    Aspiration pneumonia 7/27/2015    Asterixis 11/8/2016    C. difficile colitis 8/7/2015    Cholelithiasis without obstruction 8/25/2015    Chronic diastolic heart failure     2-23-17   1 - Low normal to mildly depressed left ventricular systolic function (EF 50-55%).    2 - Right ventricular enlargement with mildly depressed systolic function.    3 - Left ventricular diastolic dysfunction.    4 - Right atrial enlargement.    5 - Severe tricuspid regurgitation.    6 - Pulmonary hypertension. The estimated PA systolic pressure is 86 mmHg.    7 - Increased central venous pressure.  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    Metabolic acidosis, IAG, reduced excretion of inorganic acids     Myoclonic jerking 9/20/2016    Secondary hyperparathyroidism (of renal origin)     Secondary pulmonary hypertension 3/23/2017    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    TB lung, latent 08/25/2015    Negative Quantiferon Gold 3-23-17     Past Surgical History:   Procedure Laterality Date    COLONOSCOPY      FOOT AMPUTATION THROUGH METATARSAL      left foot    LEG AMPUTATION THROUGH KNEE  12/18/2013    left BKA    R AVF  9/12/12     Family History   Problem Relation Age of Onset    Early death Mother     Kidney disease Father     Hypertension Father     Heart disease Father     Hyperlipidemia Father     Diabetes Brother     Alcohol abuse Maternal Grandmother     Diabetes Maternal Grandfather     Hypertension Sister     Melanoma Neg Hx      Social History     Tobacco Use    Smoking status: Former Smoker     Packs/day: 1.00     Years: 10.00     Pack years: 10.00    Smokeless tobacco: Never Used   Substance Use Topics    Alcohol use: No    Drug use: No     Review of Systems    Physical Exam     Initial Vitals [08/22/18 0108]   BP Pulse Resp Temp SpO2   (!) 174/108 94 (!) 22 98.4 °F (36.9 °C) 100 %      MAP       --         Physical Exam    Nursing note and vitals reviewed.  Constitutional: He appears well-developed and well-nourished. He appears distressed.   Moderate, vomiting  coffee  ground emesis   HENT:   Head: Normocephalic and atraumatic.   Mouth/Throat: Oropharynx is clear and moist.   Eyes: Pupils are equal, round, and reactive to light.   Neck: Normal range of motion. Neck supple.   Cardiovascular: Normal rate, regular rhythm, normal heart sounds and intact distal pulses.   Pulmonary/Chest: Breath sounds normal. No respiratory distress. He has no wheezes. He has no rhonchi. He has no rales. He exhibits no tenderness.   Abdominal: Soft. He exhibits no distension and no mass. There is no tenderness. There is no rebound and no guarding.   Musculoskeletal: He exhibits no edema or tenderness.   Left BKA   Neurological: He is alert and oriented to person, place, and time.   Skin: Skin is warm and dry. Capillary refill takes less than 2 seconds.   Psychiatric: He has a normal mood and affect. His behavior is normal.         ED Course   Procedures  Labs Reviewed   AMYLASE - Abnormal; Notable for the following components:       Result Value    Amylase 149 (*)     All other components within normal limits   COMPREHENSIVE METABOLIC PANEL - Abnormal; Notable for the following components:    Potassium 3.2 (*)     Chloride 91 (*)     CO2 35 (*)     BUN, Bld 37 (*)     Creatinine 8.8 (*)     Calcium 11.0 (*)     Total Protein 9.4 (*)     Anion Gap 19 (*)     eGFR if  7.1 (*)     eGFR if non  6.1 (*)     All other components within normal limits   TROPONIN I - Abnormal; Notable for the following components:    Troponin I 0.266 (*)     All other components within normal limits   CBC W/ AUTO DIFFERENTIAL - Abnormal; Notable for the following components:    RBC 4.52 (*)     Hemoglobin 13.6 (*)     All other components within normal limits   APTT   CBC W/ AUTO DIFFERENTIAL   LIPASE   MAGNESIUM   PROTIME-INR   URINALYSIS   TYPE & SCREEN     EKG Readings: (Independently Interpreted)   NSR rate 90, no ST eleavtion or depression, artifact present, non ischemic T wave  abnormalities, prolonged QT, abnormal ekg,         Imaging Results          X-Ray Abdomen Flat And Erect (Final result)  Result time 08/22/18 03:39:08    Final result by Jatin Duncan MD (08/22/18 03:39:08)                 Impression:      1. Malpositioned enteric catheter, recommend advancing at least 10 cm.  2. Cholelithiasis.      Electronically signed by: Jatin Duncan MD  Date:    08/22/2018  Time:    03:39             Narrative:    EXAMINATION:  XR ABDOMEN FLAT AND ERECT    TECHNIQUE:  Upright and supine abdominal radiographs    COMPARISON:  [id number]    FINDINGS:  Enteric catheter with tip projected over the distal esophagus, recommend advancing at least 10 cm.  There is scattered gas within nondilated loops of bowel.  No intra-abdominal free air.  Calcifications project over the right upper quadrant, presumably gallstones.  Extensive vascular calcifications are noted.  Lung bases are clear.  Catheter projects over the left lower quadrant, presumably external to the patient.                               X-Ray Chest PA And Lateral (Final result)  Result time 08/22/18 03:35:41    Final result by Jatin Duncan MD (08/22/18 03:35:41)                 Impression:      1. Enteric catheter with tip at the distal esophagus/GE junction.      Electronically signed by: Jatin Duncan MD  Date:    08/22/2018  Time:    03:35             Narrative:    EXAMINATION:  XR CHEST PA AND LATERAL    CLINICAL HISTORY:  Vomiting, unspecified    TECHNIQUE:  PA and lateral views of the chest were performed.    COMPARISON:  Radiograph 05/21/2018.    FINDINGS:  Enteric catheter demonstrates tip at the distal esophagus.  Mediastinal structures are midline.  Hilar contours are unremarkable.  Cardiac silhouette is normal in size.  Lung volumes are symmetric.  No focal consolidation.  No pneumothorax or pleural effusions.  Vascular stent projects over the right subclavian region.  No free air beneath the diaphragm.   No acute osseous abnormalities.                                 Medical Decision Making:   Initial Assessment:   Pt with upper GI bleed, H&H stable Pt was orthostatic given fluids IV PPI has been discussed with GI and currently being evaluated by MICU for admission to the floor.              Attending Attestation:         Attending Critical Care:   Critical Care Times:   Direct Patient Care (initial evaluation, reassessments, and time considering the case)................................................................40 minutes.   ==============================================================  · Total Critical Care Time - exclusive of procedural time: 40 minutes.  ==============================================================  Critical care was necessary to treat or prevent imminent or life-threatening deterioration of the following conditions: GI bleeding.   Critical care was time spent personally by me on the following activities: obtaining history from patient or relative, examination of patient, development of treatment plan with patient or relative, ordering and performing treatments and interventions and discussion with consultants.   Critical Care Condition: potentially life-threatening                  Clinical Impression:   The primary encounter diagnosis was Upper GI bleed. Diagnoses of Abdominal pain, Vomiting, Dialysis patient, ESRD on hemodialysis, Erosive gastritis, Dehydration, and Orthostasis were also pertinent to this visit.      Disposition:   Disposition: Admitted  Condition: Stable                        Jose Evans Jr., MD  08/28/18 0869

## 2018-08-22 NOTE — ASSESSMENT & PLAN NOTE
ESRD on iHD F  [facility]  Dr. Lemus  3:30  RICHARD AVF    Plan/recommendations  1) HD today for metabolic clearance  2) Appears dry on exam, no UF today.  Will administer 1L of NS with HD today  3) will obtain OP records from [facility]  4) unlikely to tolerate PO meds at this time, would restart PO home BP meds when possible.  In meantime, would start on PRN hydralazine 10 mg IV q 4-6 hours as needed for SBP > 180, up-titrate as needed for BP support.

## 2018-08-22 NOTE — SUBJECTIVE & OBJECTIVE
Past Medical History:   Diagnosis Date    Amputation stump pain 9/10/2013    Aspiration pneumonia 7/27/2015    Asterixis 11/8/2016    C. difficile colitis 8/7/2015    Cholelithiasis without obstruction 8/25/2015    Chronic diastolic heart failure     2-23-17   1 - Low normal to mildly depressed left ventricular systolic function (EF 50-55%).    2 - Right ventricular enlargement with mildly depressed systolic function.    3 - Left ventricular diastolic dysfunction.    4 - Right atrial enlargement.    5 - Severe tricuspid regurgitation.    6 - Pulmonary hypertension. The estimated PA systolic pressure is 86 mmHg.    7 - Increased central venous pressure.    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    Metabolic acidosis, IAG, reduced excretion of inorganic acids     Myoclonic jerking 9/20/2016    Secondary hyperparathyroidism (of renal origin)     Secondary pulmonary hypertension 3/23/2017    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    TB lung, latent 08/25/2015    Negative Quantiferon Gold 3-23-17       Past Surgical History:   Procedure Laterality Date    COLONOSCOPY      FOOT AMPUTATION THROUGH METATARSAL      left foot    LEG AMPUTATION THROUGH KNEE  12/18/2013    left BKA    R AVF  9/12/12       Review of patient's allergies indicates:   Allergen Reactions    Fosrenol [lanthanum] Nausea And Vomiting     Nausea and vomiting     Family History     Problem Relation (Age of Onset)    Alcohol abuse Maternal  Grandmother    Diabetes Brother, Maternal Grandfather    Early death Mother    Heart disease Father    Hyperlipidemia Father    Hypertension Father, Sister    Kidney disease Father        Tobacco Use    Smoking status: Former Smoker     Packs/day: 1.00     Years: 10.00     Pack years: 10.00    Smokeless tobacco: Never Used   Substance and Sexual Activity    Alcohol use: No    Drug use: No    Sexual activity: Yes     Partners: Female     Birth control/protection: None     Review of Systems   Constitutional: Negative for activity change, appetite change, chills and fever.   HENT: Negative for sore throat and trouble swallowing.    Eyes: Negative for visual disturbance.   Respiratory: Negative for chest tightness and shortness of breath.    Cardiovascular: Negative for chest pain.   Gastrointestinal: Positive for abdominal pain, nausea and vomiting. Negative for abdominal distention, anal bleeding, blood in stool, constipation and diarrhea.   Genitourinary: Negative for dysuria and hematuria.   Musculoskeletal: Negative for arthralgias and myalgias.   Skin: Negative for rash.   Neurological: Negative for dizziness.   Psychiatric/Behavioral: Negative for agitation and confusion.     Objective:     Vital Signs (Most Recent):  Temp: 97.7 °F (36.5 °C) (08/22/18 0820)  Pulse: 81 (08/22/18 0820)  Resp: 18 (08/22/18 0820)  BP: (!) 198/102 (08/22/18 0820)  SpO2: 100 % (08/22/18 0820) Vital Signs (24h Range):  Temp:  [97.7 °F (36.5 °C)-98.4 °F (36.9 °C)] 97.7 °F (36.5 °C)  Pulse:  [] 81  Resp:  [18-22] 18  SpO2:  [100 %] 100 %  BP: (144-215)/() 198/102     Weight: 60.8 kg (134 lb 0.6 oz) (08/22/18 0108)  Body mass index is 21.63 kg/m².      Intake/Output Summary (Last 24 hours) at 8/22/2018 0849  Last data filed at 8/22/2018 0340  Gross per 24 hour   Intake 1150 ml   Output --   Net 1150 ml       Lines/Drains/Airways     Drain                 Hemodialysis AV Fistula Right upper arm -- days         Hemodialysis  AV Fistula 03/08/18 1522 Right upper arm 166 days         NG/OG Tube 08/22/18 0246 [place] sump 14 Fr. Left nostril less than 1 day          Peripheral Intravenous Line                 Peripheral IV - Single Lumen 08/22/18 0141 Left Antecubital less than 1 day         Peripheral IV - Single Lumen 08/22/18 0245 Left Forearm less than 1 day                Physical Exam   Constitutional: He is oriented to person, place, and time. No distress.   Lying in bed, no distress. NGT in place with greenish fluid, non-bloody. Vomiting bag at bedside without vomit. Chronically ill appearing.   HENT:   Head: Normocephalic and atraumatic.   Eyes: No scleral icterus.   Cardiovascular: Normal rate, regular rhythm and normal heart sounds.   No murmur heard.  Pulmonary/Chest: Effort normal and breath sounds normal. No respiratory distress.   Abdominal: Soft. Bowel sounds are normal. He exhibits no distension. There is no tenderness. There is no rebound.   Musculoskeletal: He exhibits no tenderness.   Neurological: He is alert and oriented to person, place, and time.   Skin: Skin is warm and dry. He is not diaphoretic.   Psychiatric: He has a normal mood and affect. His behavior is normal. Judgment and thought content normal.   Nursing note and vitals reviewed.      Significant Labs:  CBC:   Recent Labs   Lab  08/22/18 0142 08/22/18   0418   WBC  6.65  6.58   HGB  14.7  13.6*   HCT  43.7  40.2   PLT  165  160     CMP:   Recent Labs   Lab  08/22/18 0142   GLU  107   CALCIUM  11.0*   ALBUMIN  3.9   PROT  9.4*   NA  145   K  3.2*   CO2  35*   CL  91*   BUN  37*   CREATININE  8.8*   ALKPHOS  119   ALT  18   AST  28   BILITOT  0.6     Coagulation:   Recent Labs   Lab  08/22/18 0142   INR  1.0   APTT  23.1       Significant Imaging:  KUB unremarkable.

## 2018-08-22 NOTE — ASSESSMENT & PLAN NOTE
ESRD on dialysis m-w-f  - last dialysis session Monday   - lungs ctab, xr without pulmonary edema. Not currently fluid overloaded on exam.   - K 3.2

## 2018-08-22 NOTE — CONSULTS
[facility]  Gastroenterology  Consult Note    Patient Name: Vaughn Retana  MRN: [id number]  Admission Date: 8/22/2018  Hospital Length of Stay: 0 days  Code Status: Prior   Attending Provider: Gavino Cordoba MD   Consulting Provider: Yimi Jacques MD  Primary Care Physician: Yvette Zelaya NP  Principal Problem:Upper GI bleed    Inpatient consult to Gastroenterology  Consult performed by: Yimi Jacques MD  Consult ordered by: Gavino Cordoba MD        Subjective:     HPI:  Mr Retana is a 53 year old man with history of ESRD on HD M/W/F, CHF, ICH, multiple prior evaluations for concern for UGIB (last EGD 5/22 with esophagitis), presenting to the ED with 2 days of LLQ abdominal pain, nausea and non-bloody vomitus. GI was consulted due to concern for hematemesis.    Patient reports that he was well until ~2 days ago when he developped LLQ (1/10) abdominal pain, nausea and vomitus. He notes that the vomitus was food containing and non-bloody, but remarks it may have appeared coffee-grind. He notes that he has not had any issues with PO intake or dysphagia or early satiety. He denies any changes in bowel habitus, last BM yesterday was brown, no melena. Denies any NSAID or anticoagulant use. In the ED an NGT was placed (KUB dilated bowel loops) and patient noted improvement in symptoms thereafter.    Per hugh review, the NGT led to 600ml of output which was occult +ve. Hgb on admission was 14.5 which trended to 13.6 (prior value 14 in 8/15).    Past Endo Hx  EGD (5/22) Dr. Sparks. Indication: hematemesis  - Grade D reflux esophagitis    EGD (3/16/18) Dr. De La Paz. Indication: coffee-grind emesis  - gastritis. H. Pylori negative.  - 6mm gastric polyp. 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 Galicia. Indication: UGIB  - Grade B reflux esophagitis  - gastric polyp    Past Medical History:   Diagnosis  Date    Amputation stump pain 9/10/2013    Aspiration pneumonia 7/27/2015    Asterixis 11/8/2016    C. difficile colitis 8/7/2015    Cholelithiasis without obstruction 8/25/2015    Chronic diastolic heart failure     2-23-17   1 - Low normal to mildly depressed left ventricular systolic function (EF 50-55%).    2 - Right ventricular enlargement with mildly depressed systolic function.    3 - Left ventricular diastolic dysfunction.    4 - Right atrial enlargement.    5 - Severe tricuspid regurgitation.    6 - Pulmonary hypertension. The estimated PA systolic pressure is 86 mmHg.    7 - Increased central venous pressure.    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    Metabolic acidosis, IAG, reduced excretion of inorganic acids     Myoclonic jerking 9/20/2016    Secondary hyperparathyroidism (of renal origin)     Secondary pulmonary hypertension 3/23/2017    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    TB lung, latent 08/25/2015    Negative Quantiferon Gold 3-23-17       Past Surgical History:   Procedure Laterality Date    COLONOSCOPY      FOOT AMPUTATION THROUGH METATARSAL      left foot    LEG AMPUTATION THROUGH KNEE  12/18/2013    left BKA    R AVF  9/12/12       Review of patient's allergies indicates:   Allergen Reactions    Fosrenol [lanthanum] Nausea And Vomiting     Nausea and vomiting     Family History     Problem Relation (Age of Onset)    Alcohol abuse Maternal Grandmother    Diabetes Brother,  Maternal Grandfather    Early death Mother    Heart disease Father    Hyperlipidemia Father    Hypertension Father, Sister    Kidney disease Father        Tobacco Use    Smoking status: Former Smoker     Packs/day: 1.00     Years: 10.00     Pack years: 10.00    Smokeless tobacco: Never Used   Substance and Sexual Activity    Alcohol use: No    Drug use: No    Sexual activity: Yes     Partners: Female     Birth control/protection: None     Review of Systems   Constitutional: Negative for activity change, appetite change, chills and fever.   HENT: Negative for sore throat and trouble swallowing.    Eyes: Negative for visual disturbance.   Respiratory: Negative for chest tightness and shortness of breath.    Cardiovascular: Negative for chest pain.   Gastrointestinal: Positive for abdominal pain, nausea and vomiting. Negative for abdominal distention, anal bleeding, blood in stool, constipation and diarrhea.   Genitourinary: Negative for dysuria and hematuria.   Musculoskeletal: Negative for arthralgias and myalgias.   Skin: Negative for rash.   Neurological: Negative for dizziness.   Psychiatric/Behavioral: Negative for agitation and confusion.     Objective:     Vital Signs (Most Recent):  Temp: 97.7 °F (36.5 °C) (08/22/18 0820)  Pulse: 81 (08/22/18 0820)  Resp: 18 (08/22/18 0820)  BP: (!) 198/102 (08/22/18 0820)  SpO2: 100 % (08/22/18 0820) Vital Signs (24h Range):  Temp:  [97.7 °F (36.5 °C)-98.4 °F (36.9 °C)] 97.7 °F (36.5 °C)  Pulse:  [] 81  Resp:  [18-22] 18  SpO2:  [100 %] 100 %  BP: (144-215)/() 198/102     Weight: 60.8 kg (134 lb 0.6 oz) (08/22/18 0108)  Body mass index is 21.63 kg/m².      Intake/Output Summary (Last 24 hours) at 8/22/2018 0849  Last data filed at 8/22/2018 0340  Gross per 24 hour   Intake 1150 ml   Output --   Net 1150 ml       Lines/Drains/Airways     Drain                 Hemodialysis AV Fistula Right upper arm -- days         Hemodialysis AV Fistula 03/08/18 1522 Right  upper arm 166 days         NG/OG Tube 08/22/18 0246 [place] sump 14 Fr. Left nostril less than 1 day          Peripheral Intravenous Line                 Peripheral IV - Single Lumen 08/22/18 0141 Left Antecubital less than 1 day         Peripheral IV - Single Lumen 08/22/18 0245 Left Forearm less than 1 day                Physical Exam   Constitutional: He is oriented to person, place, and time. No distress.   Lying in bed, no distress. NGT in place with greenish fluid, non-bloody. Vomiting bag at bedside without vomit. Chronically ill appearing.   HENT:   Head: Normocephalic and atraumatic.   Eyes: No scleral icterus.   Cardiovascular: Normal rate, regular rhythm and normal heart sounds.   No murmur heard.  Pulmonary/Chest: Effort normal and breath sounds normal. No respiratory distress.   Abdominal: Soft. Bowel sounds are normal. He exhibits no distension. There is no tenderness. There is no rebound.   Musculoskeletal: He exhibits no tenderness.   Neurological: He is alert and oriented to person, place, and time.   Skin: Skin is warm and dry. He is not diaphoretic.   Psychiatric: He has a normal mood and affect. His behavior is normal. Judgment and thought content normal.   Nursing note and vitals reviewed.      Significant Labs:  CBC:   Recent Labs   Lab  08/22/18 0142  08/22/18   0418   WBC  6.65  6.58   HGB  14.7  13.6*   HCT  43.7  40.2   PLT  165  160     CMP:   Recent Labs   Lab  08/22/18 0142   GLU  107   CALCIUM  11.0*   ALBUMIN  3.9   PROT  9.4*   NA  145   K  3.2*   CO2  35*   CL  91*   BUN  37*   CREATININE  8.8*   ALKPHOS  119   ALT  18   AST  28   BILITOT  0.6     Coagulation:   Recent Labs   Lab  08/22/18 0142   INR  1.0   APTT  23.1       Significant Imaging:  KUB unremarkable.    Assessment/Plan:     * Upper GI bleed    Mr Retana is a 53 year old man with history of ESRD on HD M/W/F, CHF, ICH, multiple prior evaluations for concern for UGIB (last EGD 5/22 with esophagitis), presenting to the  ED with 2 days of LLQ abdominal pain, nausea and non-bloody vomitus. GI was consulted due to concern for hematemesis.    On bedside evaluation patient has NGT in place with green output but no evidence of hematemesis/coffee-grind emesis. Has multiple prior EGD (5/22, 3/16, 3/8) for concern for UGIB with gastritis/esophagitis. On admission Hgb 14.5 -> 13.6 which is far above prior value (14 8/5). BM are brown per patient.    Imp: does not appear patient has an active UGIB at this time    Plan  - may be hemoconcentrated given hgb of 14 while ESRD, though was 14 on 8/15 or could be excessive dosing of epo as outpatient.  - recommended conservative management  - ok to advance to full liquid diet  - continue Protonix BID IV  - trend H&H  - please inform GI team of any changes in clinical status              Thank you for your consult. I will follow-up with patient. Please contact us if you have any additional questions.    Yimi Jacques MD  Gastroenterology  [facility]

## 2018-08-22 NOTE — PROGRESS NOTES
3.5hr HD treatment completed no fluid removed net positive 1L as ordered pt tolerated well. Both needles of a RICHARD fistula removed and pressure held until hemostasis achieved dressing applied. Report given to ISMAEL Moss.

## 2018-08-22 NOTE — CONSULTS
"[facility]  Critical Care Medicine  History and Physical    Admission Date: 8/22/2018  Hospital Length of Stay: 0    Code Status: Prior     Principle Problem: GI bleed    Subjective:     HPI:     54yo M h/o Hematemesis (EGD 5/2018 with gastritis, no varices), cholelithiasis, HFrEF (EF 50%); chronic pain, HTN, ESRD on M-W-F dialysis presenting with 3 days of hematemesis. The pt is noting epigastric abdominal pain, n/v. He states he has had multiple episodes of "orange" colored vomiting but is not sure if it is blood. The patient denies bloody stools, dark tarry stools. Per chart review the patient had an EGD for similar symptoms 5/2018 and was found to have gastritis. No ulcers at that time. No varices on EGD. In the ED the patient was hypertensive with BP 190s with a HR 89. The pt was orthostatic with a  and  systolic. The pt received 1L NS with improvement in his symptoms. Hgb/HCT 13/40. The pt had 600cc of output while in the ed that was heme occult positive. XR shows dilated bowel loops. The patient is passing gas while he is here and had a normal bowel movement today. GI evaluated the patient and recommended floor vs ICU admission. They are planning to scope the patient during this admission.     Past Medical History:   Diagnosis Date    Amputation stump pain 9/10/2013    Aspiration pneumonia 7/27/2015    Asterixis 11/8/2016    C. difficile colitis 8/7/2015    Cholelithiasis without obstruction 8/25/2015    Chronic diastolic heart failure     2-23-17   1 - Low normal to mildly depressed left ventricular systolic function (EF 50-55%).    2 - Right ventricular enlargement with mildly depressed systolic function.    3 - Left ventricular diastolic dysfunction.    4 - Right atrial enlargement.    5 - Severe tricuspid regurgitation.    6 - Pulmonary hypertension. The estimated PA systolic pressure is 86 mmHg.    7 - Increased central venous pressure.    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    Metabolic acidosis, IAG, reduced excretion of inorganic acids     Myoclonic jerking 9/20/2016    Secondary hyperparathyroidism (of renal origin)     Secondary pulmonary hypertension 3/23/2017    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    TB lung, latent 08/25/2015    Negative Quantiferon Gold 3-23-17      Past Surgical History:   Procedure Laterality Date    COLONOSCOPY      FOOT AMPUTATION THROUGH METATARSAL      left foot    LEG AMPUTATION THROUGH KNEE  12/18/2013    left BKA    R AVF  9/12/12      No current facility-administered medications on file prior to encounter.      Current Outpatient Medications on File Prior to Encounter   Medication Sig Dispense Refill    amLODIPine (NORVASC) 10 MG tablet Take 10 mg by mouth once daily.      atorvastatin (LIPITOR) 80 MG tablet Take 80 mg by mouth every evening.      carvedilol (COREG) 25 MG tablet Take 25 mg by mouth 2 (two) times daily with meals.      chlorproMAZINE (THORAZINE) 25 MG tablet Take 25 mg by mouth every 12 (twelve) hours as needed.      famotidine (PEPCID) 40 MG tablet Take 40 mg by mouth daily as needed for Heartburn.      lisinopril (PRINIVIL,ZESTRIL) 40 MG tablet Take 1 tablet (40 mg total) by mouth every evening. (Patient taking differently: Take 40 mg by mouth once daily. ) 90 tablet 4    ondansetron (ZOFRAN) 8 MG tablet TAKE 1 TABLET BY MOUTH EVERY 8 HOURS AS NEEDED FOR NAUSEA 15 tablet 0    pantoprazole  (PROTONIX) 40 MG tablet Take 1 tablet (40 mg total) by mouth 2 (two) times daily before meals. 60 tablet 11    hydrALAZINE (APRESOLINE) 50 MG tablet Take 50 mg by mouth every 8 (eight) hours.        Review of patient's allergies indicates:   Allergen Reactions    Fosrenol [lanthanum] Nausea And Vomiting     Nausea and vomiting       Family History   Problem Relation Age of Onset    Early death Mother     Kidney disease Father     Hypertension Father     Heart disease Father     Hyperlipidemia Father     Diabetes Brother     Alcohol abuse Maternal Grandmother     Diabetes Maternal Grandfather     Hypertension Sister     Melanoma Neg Hx        reports that he has quit smoking. He has a 10.00 pack-year smoking history. he has never used smokeless tobacco. He reports that he does not drink alcohol or use drugs.     Review of Systems:  Constitutional: Denies fevers, weight loss, chills, or weakness.  Eyes: Denies changes in vision.  ENT: Denies dysphagia, nasal discharge, ear pain or discharge.  Cardiovascular: Denies chest pain, palpitations, orthopnea, or claudication.  Respiratory: Denies shortness of breath, cough, hemoptysis, or wheezing.  GI: Denies nausea/vomiting, hematochezia, melena, abd pain, or changes in appetite.  : Denies dysuria, incontinence, or hematuria.  Musculoskeletal: Denies joint pain or myalgias.  Skin/breast: Denies rashes, lumps, lesions, or discharge.  Neurologic: Denies headache, dizziness, vertigo, or paresthesias.  Psychiatric: Denies changes in mood or hallucinations.  Endocrine: Denies polyuria, polydipsia, heat/cold intolerance.  Hematologic/Lymph: Denies lymphadenopathy, easy bruising or easy bleeding.  Allergic/Immunologic: Denies rash, rhinitis.     Medications:  Infusions:    Scheduled Meds:    PRN Meds:      Objective:   Vital Signs (Most Recent):  Temp: 98.4 °F (36.9 °C) (08/22/18 0108)  Pulse: 108 (08/22/18 0446)  Resp: (!) 22 (08/22/18 0108)  BP: (!) 144/80  (08/22/18 0446)  SpO2: 100 % (08/22/18 0108) Vital Signs (24h Range):  Temp:  [98.4 °F (36.9 °C)] 98.4 °F (36.9 °C)  Pulse:  [] 108  Resp:  [22] 22  SpO2:  [100 %] 100 %  BP: (144-215)/() 144/80     I & O (24h):    Intake/Output Summary (Last 24 hours) at 8/22/2018 0559  Last data filed at 8/22/2018 0340  Gross per 24 hour   Intake 1150 ml   Output --   Net 1150 ml        Physical Exam:  GA: Alert, comfortable, no acute distress. NG tube in place.    HEENT: Adequate dentition. No visible oropharyngeal abnormalities. No scleral icterus or JVD. No visible thyromegally.   Pulmonary: Chest wall symmetric. No accessory muscle use. No abnormalities on percussion. No tenderness to palpation. Clear to auscultation A/P/L bilaterally. No wheezing, crackles, or rhonchi.  Cardiac: RRR S1 & S2 w/o rubs/murmurs/gallops. No lower extremity edema.   Abdominal: Bowel sounds present x 4. TTP over the epigastric region and LLQ with voluntary guarding. No rebound.   Skin: No jaundice, rashes, or visible lesions.  Lymphatic: No cervical or inguinal lymphadenopathy.  Musculoskeletal: Moves all extremities 5/5. Pt has a BKA on the left and wears a prosthesis.   Extremities: No clubbing or cyanosis.  Neuro:  --GCS: E4 V5 M6  --Mental Status:  Alert, awake. Answers questions appropriately. Moving all extremities.     Vent Data:          Lines/Drains/Airway:               NG/OG Tube 08/22/18 0246 [place] sump 14 Fr. Left nostril (Active)            Hemodialysis AV Fistula Right upper arm (Active)            Hemodialysis AV Fistula 03/08/18 1522 Right upper arm (Active)        Nutrition:  Current Diet Order   No orders of the defined types were placed in this encounter.        Labs:  CBC/Anemia Profile:   Recent Labs   Lab  08/15/18   1200   08/15/18   1547  08/22/18   0142  08/22/18   0418   WBC  5.89   --    --   6.65  6.58   HGB  14.9   --   14.5  14.7  13.6*   HCT  44.1   < >  42.9  43.7  40.2   PLT  149*   --    --   165  160    MCV  90   --    --   89  89   RDW  14.7*   --    --   14.4  14.5    < > = values in this interval not displayed.        Coags:   Recent Labs   Lab  08/15/18   1253  08/22/18   0142   INR  1.1  1.0   APTT  <21.0  23.1        Chemistries:   Recent Labs   Lab  08/15/18   1200  08/22/18   0142   NA  138  145   K  5.0  3.2*   CL  96  91*   CO2  28  35*   BUN  21*  37*   CREATININE  5.9*  8.8*   CALCIUM  9.9  11.0*   PROT  9.7*  9.4*   BILITOT  0.8  0.6   ALKPHOS  133  119   ALT  20  18   AST  33  28   MG  2.7*  2.5        Urine Studies:   Lab Results   Component Value Date    COLORU Yellow 11/03/2016    APPEARANCEUA Hazy (A) 11/03/2016    PHUR 8.0 11/03/2016    SPECGRAV 1.010 11/03/2016    PROTEINUA 3+ (A) 11/03/2016    GLUCUA 250 11/03/2016    KETONESU Negative 11/03/2016    BILIRUBINUA Negative 11/03/2016    OCCULTUA 1+ (A) 11/03/2016    NITRITE Negative 11/03/2016    UROBILINOGEN Negative 11/03/2016    LEUKOCYTESUR 1+ (A) 11/03/2016    RBCUA 2 11/03/2016    WBCUA 15 (H) 11/03/2016    BACTERIA Rare 11/03/2016    SQUAMEPITHEL 3 11/03/2016    HYALINECASTS 2 (A) 11/03/2016        Lactic Acid:   Lab Results   Component Value Date    LACTATE 1.0 05/21/2018    LACTATE 0.9 04/03/2018    LACTATE 1.8 04/03/2018        Cardiac Enzymes:   Recent Labs      08/22/18   0142   TROPONINI  0.266*        Arterial Blood Gas:   No results for input(s): PH, PCO2, HCO3, POCSATURATED, BE in the last 24 hours.    Invalid input(s):  PO2     Microbiology Results (last 7 days)     ** No results found for the last 168 hours. **          Imaging (personally reviewed):          Assessment/Plan:     54yo M h/o ESRD on dialysis, gastritis, cholelithiasis, HFrEF presenting with hematemesis and epigastric abdominal pain. The patient had coffee ground emesis in the ED. NGT was inserted. Pt was initially orthostatic which corrected with 1L of NS. The patients hgb stable at 13/40. The patient had a recent EGD showing gastritis without varices or  ulcerations in May 2018. The patient does have abdominal tenderness in the epigastric region and in the LLQ.  Labs were unremarkable. XR abd shows distended small bowel however patient is non-distended, has active bowel sounds, is passing gas and had regular bowel movements today which is less c/f sbo.     The patient is HDS and NGT output has ceased over the last few hours. The patient has a recent EGD showing gastritis for a similar presentation without varices or ulceration of the stomach. This is very reassuring. Patient is currently stable for the floor with GI on consult.     Thank you for consulting critical care medicine.     Bri Hurtado MD     Critical Care Medicine

## 2018-08-22 NOTE — SUBJECTIVE & OBJECTIVE
Past Medical History:   Diagnosis Date    Amputation stump pain 9/10/2013    Aspiration pneumonia 7/27/2015    Asterixis 11/8/2016    C. difficile colitis 8/7/2015    Cholelithiasis without obstruction 8/25/2015    Chronic diastolic heart failure     2-23-17   1 - Low normal to mildly depressed left ventricular systolic function (EF 50-55%).    2 - Right ventricular enlargement with mildly depressed systolic function.    3 - Left ventricular diastolic dysfunction.    4 - Right atrial enlargement.    5 - Severe tricuspid regurgitation.    6 - Pulmonary hypertension. The estimated PA systolic pressure is 86 mmHg.    7 - Increased central venous pressure.    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    Metabolic acidosis, IAG, reduced excretion of inorganic acids     Myoclonic jerking 9/20/2016    Secondary hyperparathyroidism (of renal origin)     Secondary pulmonary hypertension 3/23/2017    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    TB lung, latent 08/25/2015    Negative Quantiferon Gold 3-23-17       Past Surgical History:   Procedure Laterality Date    COLONOSCOPY      FOOT AMPUTATION THROUGH METATARSAL      left foot    LEG AMPUTATION THROUGH KNEE  12/18/2013    left BKA    R AVF  9/12/12       Review of patient's allergies indicates:   Allergen Reactions    Fosrenol [lanthanum] Nausea And Vomiting     Nausea and vomiting     Current Facility-Administered Medications   Medication Frequency     acetaminophen tablet 650 mg Q6H PRN    atorvastatin tablet 80 mg QHS    dextrose 50% injection 12.5 g PRN    dextrose 50% injection 25 g PRN    glucagon (human recombinant) injection 1 mg PRN    glucose chewable tablet 16 g PRN    glucose chewable tablet 24 g PRN    ondansetron injection 4 mg Q8H PRN    pantoprazole (PROTONIX) 40 mg in dextrose 5 % 100 mL IVPB Q12H    sodium chloride 0.9% flush 5 mL PRN     Current Outpatient Medications   Medication    amLODIPine (NORVASC) 10 MG tablet    atorvastatin (LIPITOR) 80 MG tablet    carvedilol (COREG) 25 MG tablet    chlorproMAZINE (THORAZINE) 25 MG tablet    famotidine (PEPCID) 40 MG tablet    lisinopril (PRINIVIL,ZESTRIL) 40 MG tablet    ondansetron (ZOFRAN) 8 MG tablet    pantoprazole (PROTONIX) 40 MG tablet    hydrALAZINE (APRESOLINE) 50 MG tablet     Family History     Problem Relation (Age of Onset)    Alcohol abuse Maternal Grandmother    Diabetes Brother, Maternal Grandfather    Early death Mother    Heart disease Father    Hyperlipidemia Father    Hypertension Father, Sister    Kidney disease Father        Tobacco Use    Smoking status: Former Smoker     Packs/day: 1.00     Years: 10.00     Pack years: 10.00    Smokeless tobacco: Never Used   Substance and Sexual Activity    Alcohol use: No    Drug use: No    Sexual activity: Yes     Partners: Female     Birth control/protection: None     Review of Systems   Constitutional: Positive for appetite change and fatigue. Negative for activity change, chills and fever.   HENT: Negative for sore throat and trouble swallowing.    Eyes: Negative for visual disturbance.   Respiratory: Negative for chest tightness and shortness of breath.    Cardiovascular: Negative for chest pain.   Gastrointestinal: Positive for abdominal pain, nausea and vomiting. Negative for abdominal distention, anal bleeding, blood in stool, constipation and diarrhea.   Musculoskeletal: Negative for arthralgias and myalgias.    Skin: Negative for rash.   Neurological: Negative for dizziness, facial asymmetry and headaches.   Psychiatric/Behavioral: Negative for agitation and confusion.     Objective:     Vital Signs (Most Recent):  Temp: 97.7 °F (36.5 °C) (08/22/18 0820)  Pulse: 81 (08/22/18 0820)  Resp: 18 (08/22/18 0820)  BP: (!) 198/102 (08/22/18 0820)  SpO2: 100 % (08/22/18 0820)  O2 Device (Oxygen Therapy): room air (08/22/18 0820) Vital Signs (24h Range):  Temp:  [97.7 °F (36.5 °C)-98.4 °F (36.9 °C)] 97.7 °F (36.5 °C)  Pulse:  [] 81  Resp:  [18-22] 18  SpO2:  [100 %] 100 %  BP: (144-215)/() 198/102     Weight: 60.8 kg (134 lb 0.6 oz) (08/22/18 0108)  Body mass index is 21.63 kg/m².  Body surface area is 1.68 meters squared.    I/O last 3 completed shifts:  In: 1150 [I.V.:100; IV Piggyback:1050]  Out: -     Physical Exam   Constitutional: He is oriented to person, place, and time. He appears well-developed. He appears lethargic. He is easily aroused. He has a sickly appearance. He appears ill. No distress.   HENT:   Head: Normocephalic and atraumatic.   Right Ear: External ear normal.   Left Ear: External ear normal.   Eyes: Conjunctivae and EOM are normal. Right eye exhibits no discharge. Left eye exhibits no discharge.   Neck: Normal range of motion. Neck supple.   Cardiovascular: Normal rate and regular rhythm. Exam reveals no gallop and no friction rub.   No murmur heard.  Pulmonary/Chest: Effort normal and breath sounds normal. No stridor. No respiratory distress. He has no wheezes. He has no rales.   Abdominal: Soft. He exhibits no distension. There is tenderness.   Musculoskeletal: Normal range of motion. He exhibits no edema or deformity.   Neurological: He is oriented to person, place, and time and easily aroused. He appears lethargic.   Skin: Skin is warm and dry. He is not diaphoretic.       Significant Labs:  CBC:   Recent Labs   Lab  08/22/18   0418   WBC  6.58   RBC  4.52*   HGB  13.6*   HCT  40.2   PLT   160   MCV  89   MCH  30.1   MCHC  33.8     CMP:   Recent Labs   Lab  08/22/18   0142   GLU  107   CALCIUM  11.0*   ALBUMIN  3.9   PROT  9.4*   NA  145   K  3.2*   CO2  35*   CL  91*   BUN  37*   CREATININE  8.8*   ALKPHOS  119   ALT  18   AST  28   BILITOT  0.6

## 2018-08-22 NOTE — ED NOTES
Attempt to drop 18fr NG in right nare unsuccessful, obstruction encountered. Pt not tolerating well.

## 2018-08-22 NOTE — CONSULTS
"[facility]  Nephrology  Consult Note    Patient Name: Vaughn Retana  MRN: [id number]  Admission Date: 8/22/2018  Hospital Length of Stay: 0 days  Attending Provider: Gavino Cordoba MD   Primary Care Physician: Yvette Zelaya NP  Principal Problem:Upper GI bleed    Inpatient consult to Nephrology  Consult performed by: Wade Navarro NP  Consult ordered by: Gavino Cordoba MD  Reason for consult: ESRD        Subjective:     HPI: Mr. Retana is a 51 yo AAM with HTN, DM2, HFrEF, Esophagitis and gastritis, with Hx of ICH, and L BKA, on iHD on MWF who presented to the hospital on 08/22 with chief compaint of N/V and abdominal pain.  He was recently sent to the hospital last week after he had what was described as "coffee-ground emesis" at his outpatient dialysis unit last week.  He symptoms improved w/o signs of active GI bleed, and he was sent home.  He has been followed by GI during his past admissions with EGDs performed on 05/22, 03/8 and 03/16 all revealing gastritis and esophagitis.  He reports these symptoms have been present x 3 days, with the inability to take his medications at home or eat/drink.  He reports compliance with his dialysis prescription, but does report that he sometimes ends his dialysis treatment due to nausea and vomiting.  In the ED, he underwent abdominal imaging which revealed dilated bowels and NG tube was placed with 600 ml of contents removed which tested positive for heme.  He was also found to be hypertensive with BP >200/100's and was given IV labetalol.  Labs interpretation appears to be hemo-concentrated with H/H of 14.7/43.7, unlikely to be related to JONAS dosing at his dialysis unit.  Chemistries appear to be on the dry side as his sodium is high normal, typically not seen in HD patients, along with new hypercalcemia and a metabolic alkalosis.  He was found to have orthostatic changes with vitals and was administered 1L of NS.  GI evaluated " patient at bedside, and they are currently planning on medical treatment with IV Protonix w/o further imaging studies.  Nephrology was consulted for ESRD management.    He dialyzes at [facility] under the care of Dr. Lemus. He dialyzes for  3.5 hours using RICHARD AVF.            Past Medical History:   Diagnosis Date    Amputation stump pain 9/10/2013    Aspiration pneumonia 7/27/2015    Asterixis 11/8/2016    C. difficile colitis 8/7/2015    Cholelithiasis without obstruction 8/25/2015    Chronic diastolic heart failure     2-23-17   1 - Low normal to mildly depressed left ventricular systolic function (EF 50-55%).    2 - Right ventricular enlargement with mildly depressed systolic function.    3 - Left ventricular diastolic dysfunction.    4 - Right atrial enlargement.    5 - Severe tricuspid regurgitation.    6 - Pulmonary hypertension. The estimated PA systolic pressure is 86 mmHg.    7 - Increased central venous pressure.    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    Metabolic acidosis, IAG, reduced excretion of inorganic acids     Myoclonic jerking 9/20/2016    Secondary hyperparathyroidism (of renal origin)     Secondary pulmonary hypertension 3/23/2017    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    TB lung, latent 08/25/2015    Negative Quantiferon Gold 3-23-17       Past Surgical History:   Procedure Laterality Date    COLONOSCOPY      FOOT AMPUTATION THROUGH METATARSAL      left foot    LEG  AMPUTATION THROUGH KNEE  12/18/2013    left BKA    R AVF  9/12/12       Review of patient's allergies indicates:   Allergen Reactions    Fosrenol [lanthanum] Nausea And Vomiting     Nausea and vomiting     Current Facility-Administered Medications   Medication Frequency    acetaminophen tablet 650 mg Q6H PRN    atorvastatin tablet 80 mg QHS    dextrose 50% injection 12.5 g PRN    dextrose 50% injection 25 g PRN    glucagon (human recombinant) injection 1 mg PRN    glucose chewable tablet 16 g PRN    glucose chewable tablet 24 g PRN    ondansetron injection 4 mg Q8H PRN    pantoprazole (PROTONIX) 40 mg in dextrose 5 % 100 mL IVPB Q12H    sodium chloride 0.9% flush 5 mL PRN     Current Outpatient Medications   Medication    amLODIPine (NORVASC) 10 MG tablet    atorvastatin (LIPITOR) 80 MG tablet    carvedilol (COREG) 25 MG tablet    chlorproMAZINE (THORAZINE) 25 MG tablet    famotidine (PEPCID) 40 MG tablet    lisinopril (PRINIVIL,ZESTRIL) 40 MG tablet    ondansetron (ZOFRAN) 8 MG tablet    pantoprazole (PROTONIX) 40 MG tablet    hydrALAZINE (APRESOLINE) 50 MG tablet     Family History     Problem Relation (Age of Onset)    Alcohol abuse Maternal Grandmother    Diabetes Brother, Maternal Grandfather    Early death Mother    Heart disease Father    Hyperlipidemia Father    Hypertension Father, Sister    Kidney disease Father        Tobacco Use    Smoking status: Former Smoker     Packs/day: 1.00     Years: 10.00     Pack years: 10.00    Smokeless tobacco: Never Used   Substance and Sexual Activity    Alcohol use: No    Drug use: No    Sexual activity: Yes     Partners: Female     Birth control/protection: None     Review of Systems   Constitutional: Positive for appetite change and fatigue. Negative for activity change, chills and fever.   HENT: Negative for sore throat and trouble swallowing.    Eyes: Negative for visual disturbance.   Respiratory: Negative for chest tightness and shortness  of breath.    Cardiovascular: Negative for chest pain.   Gastrointestinal: Positive for abdominal pain, nausea and vomiting. Negative for abdominal distention, anal bleeding, blood in stool, constipation and diarrhea.   Musculoskeletal: Negative for arthralgias and myalgias.   Skin: Negative for rash.   Neurological: Negative for dizziness, facial asymmetry and headaches.   Psychiatric/Behavioral: Negative for agitation and confusion.     Objective:     Vital Signs (Most Recent):  Temp: 97.7 °F (36.5 °C) (08/22/18 0820)  Pulse: 81 (08/22/18 0820)  Resp: 18 (08/22/18 0820)  BP: (!) 198/102 (08/22/18 0820)  SpO2: 100 % (08/22/18 0820)  O2 Device (Oxygen Therapy): room air (08/22/18 0820) Vital Signs (24h Range):  Temp:  [97.7 °F (36.5 °C)-98.4 °F (36.9 °C)] 97.7 °F (36.5 °C)  Pulse:  [] 81  Resp:  [18-22] 18  SpO2:  [100 %] 100 %  BP: (144-215)/() 198/102     Weight: 60.8 kg (134 lb 0.6 oz) (08/22/18 0108)  Body mass index is 21.63 kg/m².  Body surface area is 1.68 meters squared.    I/O last 3 completed shifts:  In: 1150 [I.V.:100; IV Piggyback:1050]  Out: -     Physical Exam   Constitutional: He is oriented to person, place, and time. He appears well-developed. He appears lethargic. He is easily aroused. He has a sickly appearance. He appears ill. No distress.   HENT:   Head: Normocephalic and atraumatic.   Right Ear: External ear normal.   Left Ear: External ear normal.   Eyes: Conjunctivae and EOM are normal. Right eye exhibits no discharge. Left eye exhibits no discharge.   Neck: Normal range of motion. Neck supple.   Cardiovascular: Normal rate and regular rhythm. Exam reveals no gallop and no friction rub.   No murmur heard.  Pulmonary/Chest: Effort normal and breath sounds normal. No stridor. No respiratory distress. He has no wheezes. He has no rales.   Abdominal: Soft. He exhibits no distension. There is tenderness.   Musculoskeletal: Normal range of motion. He exhibits no edema or deformity.    Neurological: He is oriented to person, place, and time and easily aroused. He appears lethargic.   Skin: Skin is warm and dry. He is not diaphoretic.       Significant Labs:  CBC:   Recent Labs   Lab  08/22/18   0418   WBC  6.58   RBC  4.52*   HGB  13.6*   HCT  40.2   PLT  160   MCV  89   MCH  30.1   MCHC  33.8     CMP:   Recent Labs   Lab  08/22/18   0142   GLU  107   CALCIUM  11.0*   ALBUMIN  3.9   PROT  9.4*   NA  145   K  3.2*   CO2  35*   CL  91*   BUN  37*   CREATININE  8.8*   ALKPHOS  119   ALT  18   AST  28   BILITOT  0.6           Assessment/Plan:     ESRD on hemodialysis    ESRD on iHD F  [facility]  Dr. Lemus  3:30  RICHARD AVF    Plan/recommendations  1) HD today for metabolic clearance  2) Appears dry on exam, no UF today.  Will administer 1L of NS with HD today  3) will obtain OP records from [facility]  4) unlikely to tolerate PO meds at this time, would restart PO home BP meds when possible.  In meantime, would start on PRN hydralazine 10 mg IV q 4-6 hours as needed for SBP > 180, up-titrate as needed for BP support.          Wade Weber NP  Nephrology  [facility]  Pager:  [phone]

## 2018-08-22 NOTE — HPI
"Mr. Retana is a 51 yo AAM with HTN, DM2, HFrEF, Esophagitis and gastritis, with Hx of ICH, and L BKA, on iHD on MWF who presented to the hospital on 08/22 with chief compaint of N/V and abdominal pain.  He was recently sent to the hospital last week after he had what was described as "coffee-ground emesis" at his outpatient dialysis unit last week.  He symptoms improved w/o signs of active GI bleed, and he was sent home.  He has been followed by GI during his past admissions with EGDs performed on 05/22, 03/8 and 03/16 all revealing gastritis and esophagitis.  He reports these symptoms have been present x 3 days, with the inability to take his medications at home or eat/drink.  He reports compliance with his dialysis prescription, but does report that he sometimes ends his dialysis treatment due to nausea and vomiting.  In the ED, he underwent abdominal imaging which revealed dilated bowels and NG tube was placed with 600 ml of contents removed which tested positive for heme.  He was also found to be hypertensive with BP >200/100's and was given IV labetalol.  Labs interpretation appears to be hemo-concentrated with H/H of 14.7/43.7, unlikely to be related to JONAS dosing at his dialysis unit.  Chemistries appear to be on the dry side as his sodium is high normal, typically not seen in HD patients, along with new hypercalcemia and a metabolic alkalosis.  He was found to have orthostatic changes with vitals and was administered 1L of NS.  GI evaluated patient at bedside, and they are currently planning on medical treatment with IV Protonix w/o further imaging studies.  Nephrology was consulted for ESRD management.    He dialyzes at [facility] under the care of Dr. Lemus. He dialyzes for  3.5 hours using RICHARD AVF.          "

## 2018-08-22 NOTE — HOSPITAL COURSE
8/22/2018: Patient evaluated in the ED. The patient is HDS without active NGT output. On exam the pt is TTP over the epigastric region and the LLQ. There is no rebound. H/H 13/40. The patient had recent EGD on 5/2018 showing gastritis for a similar presentation. No varices or PUD on that exam. The patient has been seen by GI. The patient does not currently need ICU level care. Hospital medicine consulted per ED.

## 2018-08-22 NOTE — ED NOTES
Bed: [facility] 01  Expected date:   Expected time:   Means of arrival:   Comments:

## 2018-08-22 NOTE — HPI
"  52yo M h/o Hematemesis (EGD 5/2018 with gastritis, no varices), cholelithiasis, HFrEF (EF 50%); chronic pain, HTN, ESRD on M-W-F dialysis presenting with 3 days of hematemesis. The pt is noting epigastric abdominal pain, n/v. He states he has had multiple episodes of "orange" colored vomiting but is not sure if it is blood. The patient denies bloody stools, dark tarry stools. Per chart review the patient had an EGD for similar symptoms 5/2018 and was found to have gastritis. No ulcers at that time. No varices on EGD. In the ED the patient was hypertensive with BP 190s with a HR 89. The pt was orthostatic with a  and  systolic. The pt received 1L NS with improvement in his symptoms. Hgb/HCT 13/40. The pt had 600cc of output while in the ed that was heme occult positive. XR shows dilated bowel loops. The patient is passing gas while he is here and had a normal bowel movement today. GI evaluated the patient and recommended floor vs ICU admission. They are planning to scope the patient during this admission.          "

## 2018-08-22 NOTE — PROGRESS NOTES
[facility] HEMODIALYSIS NOTE     Patient currently on hemodialysis for removal of uremic toxins and volume.    Patient seen and evaluated on hemodialysis, tolerating treatment, see HD flowsheet for vitals and assessments.      Ultrafiltration goal is rune even/give fluid      Labs have been reviewed and the dialysate bath has been adjusted.     Assessment/Plan:     HD today for removal of uremic toxins and volume.  Currently significantly orthostatic  Once volume depletion is  improved, would then regulate BP with better control

## 2018-08-22 NOTE — SUBJECTIVE & OBJECTIVE
Past Medical History:   Diagnosis Date    Amputation stump pain 9/10/2013    Aspiration pneumonia 7/27/2015    Asterixis 11/8/2016    C. difficile colitis 8/7/2015    Cholelithiasis without obstruction 8/25/2015    Chronic diastolic heart failure     2-23-17   1 - Low normal to mildly depressed left ventricular systolic function (EF 50-55%).    2 - Right ventricular enlargement with mildly depressed systolic function.    3 - Left ventricular diastolic dysfunction.    4 - Right atrial enlargement.    5 - Severe tricuspid regurgitation.    6 - Pulmonary hypertension. The estimated PA systolic pressure is 86 mmHg.    7 - Increased central venous pressure.    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    Metabolic acidosis, IAG, reduced excretion of inorganic acids     Myoclonic jerking 9/20/2016    Secondary hyperparathyroidism (of renal origin)     Secondary pulmonary hypertension 3/23/2017    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    TB lung, latent 08/25/2015    Negative Quantiferon Gold 3-23-17       Past Surgical History:   Procedure Laterality Date    COLONOSCOPY      FOOT AMPUTATION THROUGH METATARSAL      left foot    LEG AMPUTATION THROUGH KNEE  12/18/2013    left BKA    R AVF  9/12/12       Review of patient's allergies indicates:   Allergen Reactions    Fosrenol [lanthanum] Nausea And Vomiting     Nausea and vomiting       Family History     Problem Relation (Age of Onset)    Alcohol abuse  Maternal Grandmother    Diabetes Brother, Maternal Grandfather    Early death Mother    Heart disease Father    Hyperlipidemia Father    Hypertension Father, Sister    Kidney disease Father        Tobacco Use    Smoking status: Former Smoker     Packs/day: 1.00     Years: 10.00     Pack years: 10.00    Smokeless tobacco: Never Used   Substance and Sexual Activity    Alcohol use: No    Drug use: No    Sexual activity: Yes     Partners: Female     Birth control/protection: None      Review of Systems   Constitutional: Negative for diaphoresis, fatigue and fever.   HENT: Negative for facial swelling, rhinorrhea and sore throat.    Eyes: Negative for pain.   Respiratory: Negative for cough and shortness of breath.    Cardiovascular: Negative for chest pain and palpitations.   Gastrointestinal: Positive for abdominal pain, nausea and vomiting. Negative for constipation.   Genitourinary: Negative for dysuria and flank pain.   Skin: Negative for pallor.   Neurological: Negative for speech difficulty and headaches.     Objective:     Vital Signs (Most Recent):  Temp: 98.4 °F (36.9 °C) (08/22/18 0108)  Pulse: 108 (08/22/18 0446)  Resp: (!) 22 (08/22/18 0108)  BP: (!) 144/80 (08/22/18 0446)  SpO2: 100 % (08/22/18 0108) Vital Signs (24h Range):  Temp:  [98.4 °F (36.9 °C)] 98.4 °F (36.9 °C)  Pulse:  [] 108  Resp:  [22] 22  SpO2:  [100 %] 100 %  BP: (144-215)/() 144/80   Weight: 60.8 kg (134 lb 0.6 oz)  Body mass index is 21.63 kg/m².      Intake/Output Summary (Last 24 hours) at 8/22/2018 0642  Last data filed at 8/22/2018 0340  Gross per 24 hour   Intake 1150 ml   Output --   Net 1150 ml       Physical Exam  Nursing note and vitals reviewed.  Constitutional: He appears well-developed and well-nourished. He appears distressed.   Moderate, vomiting  coffee ground emesis   HENT:   Head: Normocephalic and atraumatic.   Mouth/Throat: Oropharynx is clear and moist.   Eyes: Pupils are equal, round, and reactive to  light.   Neck: Normal range of motion. Neck supple.   Cardiovascular: Normal rate, regular rhythm, normal heart sounds and intact distal pulses.   Pulmonary/Chest: Breath sounds normal. No respiratory distress. He has no wheezes. He has no rhonchi. He has no rales. He exhibits no tenderness.   Abdominal: Soft. He exhibits no distension and no mass. There is no tenderness. There is no rebound and no guarding.   Musculoskeletal: He exhibits no edema or tenderness.   Left BKA   Neurological: He is alert and oriented to person, place, and time.   Skin: Skin is warm and dry. Capillary refill takes less than 2 seconds.   Psychiatric: He has a normal mood and affect. His behavior is normal.          Vents:     Lines/Drains/Airways     Drain                 Hemodialysis AV Fistula Right upper arm -- days         Hemodialysis AV Fistula 03/08/18 1522 Right upper arm 166 days         NG/OG Tube 08/22/18 0246 Ritchie sump 14 Fr. Left nostril less than 1 day          Peripheral Intravenous Line                 Peripheral IV - Single Lumen 08/22/18 0141 Left Antecubital less than 1 day         Peripheral IV - Single Lumen 08/22/18 0245 Left Forearm less than 1 day              Significant Labs:    CBC/Anemia Profile:  Recent Labs   Lab  08/22/18   0142  08/22/18   0418   WBC  6.65  6.58   HGB  14.7  13.6*   HCT  43.7  40.2   PLT  165  160   MCV  89  89   RDW  14.4  14.5        Chemistries:  Recent Labs   Lab  08/22/18   0142   NA  145   K  3.2*   CL  91*   CO2  35*   BUN  37*   CREATININE  8.8*   CALCIUM  11.0*   ALBUMIN  3.9   PROT  9.4*   BILITOT  0.6   ALKPHOS  119   ALT  18   AST  28   MG  2.5       All pertinent labs within the past 24 hours have been reviewed.    Significant Imaging: I have reviewed all pertinent imaging results/findings within the past 24 hours.

## 2018-08-23 LAB
ALBUMIN SERPL BCP-MCNC: 3.2 G/DL
ALP SERPL-CCNC: 97 U/L
ALT SERPL W/O P-5'-P-CCNC: 13 U/L
ANION GAP SERPL CALC-SCNC: 9 MMOL/L
AST SERPL-CCNC: 19 U/L
BASOPHILS # BLD AUTO: 0.02 K/UL
BASOPHILS # BLD AUTO: 0.03 K/UL
BASOPHILS # BLD AUTO: 0.04 K/UL
BASOPHILS NFR BLD: 0.2 %
BASOPHILS NFR BLD: 0.4 %
BASOPHILS NFR BLD: 0.5 %
BILIRUB SERPL-MCNC: 0.8 MG/DL
BUN SERPL-MCNC: 12 MG/DL
CALCIUM SERPL-MCNC: 9.9 MG/DL
CHLORIDE SERPL-SCNC: 108 MMOL/L
CO2 SERPL-SCNC: 21 MMOL/L
CREAT SERPL-MCNC: 5.7 MG/DL
DIFFERENTIAL METHOD: ABNORMAL
EOSINOPHIL # BLD AUTO: 0.2 K/UL
EOSINOPHIL # BLD AUTO: 0.3 K/UL
EOSINOPHIL # BLD AUTO: 0.4 K/UL
EOSINOPHIL NFR BLD: 1.9 %
EOSINOPHIL NFR BLD: 3 %
EOSINOPHIL NFR BLD: 6.3 %
ERYTHROCYTE [DISTWIDTH] IN BLOOD BY AUTOMATED COUNT: 14.6 %
ERYTHROCYTE [DISTWIDTH] IN BLOOD BY AUTOMATED COUNT: 14.7 %
ERYTHROCYTE [DISTWIDTH] IN BLOOD BY AUTOMATED COUNT: 14.9 %
EST. GFR  (AFRICAN AMERICAN): 12 ML/MIN/1.73 M^2
EST. GFR  (NON AFRICAN AMERICAN): 10.4 ML/MIN/1.73 M^2
GLUCOSE SERPL-MCNC: 125 MG/DL
HBV SURFACE AG SERPL QL IA: NEGATIVE
HCT VFR BLD AUTO: 36.8 %
HCT VFR BLD AUTO: 37.9 %
HCT VFR BLD AUTO: 39.7 %
HGB BLD-MCNC: 12.2 G/DL
HGB BLD-MCNC: 12.7 G/DL
HGB BLD-MCNC: 13.3 G/DL
IMM GRANULOCYTES # BLD AUTO: 0.02 K/UL
IMM GRANULOCYTES # BLD AUTO: 0.03 K/UL
IMM GRANULOCYTES # BLD AUTO: 0.06 K/UL
IMM GRANULOCYTES NFR BLD AUTO: 0.3 %
IMM GRANULOCYTES NFR BLD AUTO: 0.3 %
IMM GRANULOCYTES NFR BLD AUTO: 0.6 %
LYMPHOCYTES # BLD AUTO: 1 K/UL
LYMPHOCYTES # BLD AUTO: 1.5 K/UL
LYMPHOCYTES # BLD AUTO: 1.8 K/UL
LYMPHOCYTES NFR BLD: 14.2 %
LYMPHOCYTES NFR BLD: 28.4 %
LYMPHOCYTES NFR BLD: 8.9 %
MAGNESIUM SERPL-MCNC: 2 MG/DL
MCH RBC QN AUTO: 30.3 PG
MCH RBC QN AUTO: 30.3 PG
MCH RBC QN AUTO: 30.5 PG
MCHC RBC AUTO-ENTMCNC: 33.2 G/DL
MCHC RBC AUTO-ENTMCNC: 33.5 G/DL
MCHC RBC AUTO-ENTMCNC: 33.5 G/DL
MCV RBC AUTO: 90 FL
MCV RBC AUTO: 91 FL
MCV RBC AUTO: 92 FL
MONOCYTES # BLD AUTO: 0.6 K/UL
MONOCYTES # BLD AUTO: 0.8 K/UL
MONOCYTES # BLD AUTO: 0.9 K/UL
MONOCYTES NFR BLD: 7.3 %
MONOCYTES NFR BLD: 8.3 %
MONOCYTES NFR BLD: 8.6 %
NEUTROPHILS # BLD AUTO: 3.6 K/UL
NEUTROPHILS # BLD AUTO: 8 K/UL
NEUTROPHILS # BLD AUTO: 8.7 K/UL
NEUTROPHILS NFR BLD: 55.9 %
NEUTROPHILS NFR BLD: 73.5 %
NEUTROPHILS NFR BLD: 81.4 %
NRBC BLD-RTO: 0 /100 WBC
PHOSPHATE SERPL-MCNC: 3.9 MG/DL
PLATELET # BLD AUTO: 103 K/UL
PLATELET # BLD AUTO: 128 K/UL
PLATELET # BLD AUTO: 128 K/UL
PMV BLD AUTO: 11.4 FL
PMV BLD AUTO: 12.3 FL
PMV BLD AUTO: 12.5 FL
POTASSIUM SERPL-SCNC: 4.1 MMOL/L
PROT SERPL-MCNC: 7.6 G/DL
RBC # BLD AUTO: 4.02 M/UL
RBC # BLD AUTO: 4.17 M/UL
RBC # BLD AUTO: 4.39 M/UL
SODIUM SERPL-SCNC: 138 MMOL/L
WBC # BLD AUTO: 10.65 K/UL
WBC # BLD AUTO: 10.81 K/UL
WBC # BLD AUTO: 6.4 K/UL

## 2018-08-23 PROCEDURE — 87340 HEPATITIS B SURFACE AG IA: CPT

## 2018-08-23 PROCEDURE — 25000003 PHARM REV CODE 250: Performed by: PHYSICIAN ASSISTANT

## 2018-08-23 PROCEDURE — 84100 ASSAY OF PHOSPHORUS: CPT

## 2018-08-23 PROCEDURE — 25000003 PHARM REV CODE 250: Performed by: NURSE PRACTITIONER

## 2018-08-23 PROCEDURE — 80053 COMPREHEN METABOLIC PANEL: CPT

## 2018-08-23 PROCEDURE — 85025 COMPLETE CBC W/AUTO DIFF WBC: CPT | Mod: 91

## 2018-08-23 PROCEDURE — 99232 SBSQ HOSP IP/OBS MODERATE 35: CPT | Mod: ,,, | Performed by: HOSPITALIST

## 2018-08-23 PROCEDURE — C9113 INJ PANTOPRAZOLE SODIUM, VIA: HCPCS | Performed by: HOSPITALIST

## 2018-08-23 PROCEDURE — 83735 ASSAY OF MAGNESIUM: CPT

## 2018-08-23 PROCEDURE — 36415 COLL VENOUS BLD VENIPUNCTURE: CPT

## 2018-08-23 PROCEDURE — 11000001 HC ACUTE MED/SURG PRIVATE ROOM

## 2018-08-23 PROCEDURE — 63600175 PHARM REV CODE 636 W HCPCS: Performed by: HOSPITALIST

## 2018-08-23 PROCEDURE — 25000003 PHARM REV CODE 250: Performed by: HOSPITALIST

## 2018-08-23 PROCEDURE — 63600175 PHARM REV CODE 636 W HCPCS: Performed by: NURSE PRACTITIONER

## 2018-08-23 RX ORDER — AMLODIPINE BESYLATE 10 MG/1
10 TABLET ORAL DAILY
Status: DISCONTINUED | OUTPATIENT
Start: 2018-08-23 | End: 2018-08-24 | Stop reason: HOSPADM

## 2018-08-23 RX ORDER — SODIUM CHLORIDE 9 MG/ML
INJECTION, SOLUTION INTRAVENOUS ONCE
Status: COMPLETED | OUTPATIENT
Start: 2018-08-23 | End: 2018-08-24

## 2018-08-23 RX ORDER — HYDRALAZINE HYDROCHLORIDE 50 MG/1
50 TABLET, FILM COATED ORAL EVERY 8 HOURS
Status: DISCONTINUED | OUTPATIENT
Start: 2018-08-23 | End: 2018-08-24 | Stop reason: HOSPADM

## 2018-08-23 RX ORDER — LABETALOL HYDROCHLORIDE 5 MG/ML
10 INJECTION, SOLUTION INTRAVENOUS ONCE AS NEEDED
Status: COMPLETED | OUTPATIENT
Start: 2018-08-23 | End: 2018-08-23

## 2018-08-23 RX ADMIN — ATORVASTATIN CALCIUM 80 MG: 20 TABLET, FILM COATED ORAL at 10:08

## 2018-08-23 RX ADMIN — HYDRALAZINE HYDROCHLORIDE 50 MG: 50 TABLET, FILM COATED ORAL at 10:08

## 2018-08-23 RX ADMIN — DEXTROSE 40 MG: 50 INJECTION, SOLUTION INTRAVENOUS at 10:08

## 2018-08-23 RX ADMIN — LABETALOL HYDROCHLORIDE 10 MG: 5 INJECTION INTRAVENOUS at 04:08

## 2018-08-23 RX ADMIN — HYDRALAZINE HYDROCHLORIDE 50 MG: 50 TABLET, FILM COATED ORAL at 04:08

## 2018-08-23 RX ADMIN — HYDRALAZINE HYDROCHLORIDE 50 MG: 50 TABLET, FILM COATED ORAL at 01:08

## 2018-08-23 RX ADMIN — PROMETHAZINE HYDROCHLORIDE 12.5 MG: 25 INJECTION INTRAMUSCULAR; INTRAVENOUS at 06:08

## 2018-08-23 RX ADMIN — AMLODIPINE BESYLATE 10 MG: 10 TABLET ORAL at 04:08

## 2018-08-23 RX ADMIN — SODIUM CHLORIDE 500 ML: 0.9 INJECTION, SOLUTION INTRAVENOUS at 01:08

## 2018-08-23 RX ADMIN — LABETALOL HYDROCHLORIDE 10 MG: 5 INJECTION INTRAVENOUS at 02:08

## 2018-08-23 NOTE — PLAN OF CARE
08/23/18 0910   Discharge Assessment   Assessment Type Discharge Planning Assessment   Assessment information obtained from? Medical Record   Expected Length of Stay (days) 2   Prior to hospitilization cognitive status: Alert/Oriented   Prior to hospitalization functional status: Assistive Equipment   Current cognitive status: Alert/Oriented   Current Functional Status: Assistive Equipment   Lives With other relative(s)  (neice)   Able to Return to Prior Arrangements yes   Is patient able to care for self after discharge? Yes   Patient's perception of discharge disposition home or selfcare   Readmission Within The Last 30 Days no previous admission in last 30 days   Patient currently being followed by outpatient case management? No   Equipment Currently Used at Home walker, rolling;wheelchair;shower chair;prosthesis   Do you have any problems affording any of your prescribed medications? No   Is the patient taking medications as prescribed? yes   Does the patient have transportation home? Yes   Transportation Available family or friend will provide   Dialysis Name and Scheduled days [facility]   Discharge Plan A Home with family   Discharge Plan B Home Health

## 2018-08-23 NOTE — H&P
Hospital Medicine  History and Physical Exam     Team: [facility] [place] [facility]OSP MED B Lul Bowling  Admit Date: 8/22/2018  JUAN 8/24/2018  Principal Problem:  Upper GI bleed   Patient information was obtained from patient and ER records.   Primary care Physician: Yvette Zelaya NP  Code status:      HPI:   Vaughn Retana is a 53 y.o. male with PMH of HD, CHF, ICH x2, ESRD on HD - M,W,F who presents to the ED with a complaint of abdominal pain and Nausea/vomting starting 2 days ago. He states that he has had multiple episodes of vomiting since the onset. He states he has been vomiting what he thinks is blood, appears orange. He reports associated abdomina discomfort which is constant described as sharp and radiates into the back. The patient denies diarrhea, melanous or bloody stools, fever, chest pain, headache, blurry vision, rhinorrhea. Patient has 7/10 abdominal pain that is sharp and radiates to back. Patient has had back pain that he takes motrin for.      Patient with orthostasis, hold HTN medications.  and lives with niece in [place].      Past Medical History: Patient has a past medical history of Amputation stump pain (9/10/2013), Aspiration pneumonia (7/27/2015), Asterixis (11/8/2016), C. 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     Past Surgical History: Patient has a past surgical history that includes R AVF (9/12/12); Leg amputation through knee (12/18/2013); Foot amputation through metatarsal; Colonoscopy; EGD (ESOPHAGOGASTRODUODENOSCOPY) (N/A, 6/12/2018); ESOPHAGOGASTRODUODENOSCOPY (EGD) (N/A, 5/22/2018); ESOPHAGOGASTRODUODENOSCOPY (EGD) (N/A, 3/16/2018); ESOPHAGOGASTRODUODENOSCOPY (EGD) (N/A, 3/8/2018); ESOPHAGOGASTRODUODENOSCOPY (EGD) (N/A, 4/4/2017); COLONOSCOPY (N/A, 4/4/2017); ESOPHAGOGASTRODUODENOSCOPY (EGD) (N/A, 10/17/2014); FISTULOGRAM (Right, 9/18/2014); and AMPUTATION, BELOW KNEE (Left, 12/18/2013).     Social History: Patient reports that he has quit smoking. He has a 10.00 pack-year smoking history. he has never used smokeless tobacco. He reports that he does not drink alcohol or use drugs.     Family History: family history includes Alcohol abuse in his maternal grandmother; Diabetes in his brother and maternal grandfather; Early death in his mother; Heart disease in his father; Hyperlipidemia in his father; Hypertension in his father and sister; Kidney disease in his father.     Medications:           Prior to Admission medications    Medication Sig Start Date End Date Taking? Authorizing Provider   amLODIPine (NORVASC) 10 MG tablet Take 10 mg by mouth once daily.     Yes Historical Provider, MD   atorvastatin (LIPITOR) 80 MG tablet Take 80 mg by mouth every evening.     Yes Historical Provider, MD   carvedilol (COREG) 25 MG tablet Take 25 mg by mouth 2 (two) times daily with meals.     Yes Historical Provider, MD   chlorproMAZINE (THORAZINE) 25 MG tablet Take 25 mg by mouth every 12 (twelve) hours as needed.     Yes Historical Provider, MD   famotidine (PEPCID) 40 MG tablet Take 40 mg by mouth daily as needed for Heartburn.     Yes Historical Provider, MD valenzuelainopril  (PRINIVIL,ZESTRIL) 40 MG tablet Take 1 tablet (40 mg total) by mouth every evening.  Patient taking differently: Take 40 mg by mouth once daily.  10/25/17   Yes Yvette Zelaya NP   ondansetron (ZOFRAN) 8 MG tablet TAKE 1 TABLET BY MOUTH EVERY 8 HOURS AS NEEDED FOR NAUSEA 5/24/18   Yes Deedee Ramos MD   pantoprazole (PROTONIX) 40 MG tablet Take 1 tablet (40 mg total) by mouth 2 (two) times daily before meals. 3/17/18 3/17/19 Yes Rancho Carvalho PA-C   hydrALAZINE (APRESOLINE) 50 MG tablet Take 50 mg by mouth every 8 (eight) hours.       Historical Provider, MD         Allergies: Patient is allergic to fosrenol [lanthanum].     ROS     Constitutional: Negative for activity change, appetite change, chills and fever.   HENT: Negative for sore throat and trouble swallowing.    Eyes: Negative for visual disturbance.   Respiratory: Negative for chest tightness and shortness of breath.    Cardiovascular: Negative for chest pain.   Gastrointestinal: Positive for abdominal pain and vomiting. Negative for abdominal distention, anal bleeding, blood in stool, constipation and diarrhea. LLQ pain;  Genitourinary: Negative for dysuria and hematuria. Little urine made  Musculoskeletal: Negative for arthralgias and myalgias.   Skin: Negative for rash.   Neurological: Negative for dizziness.   Psychiatric/Behavioral: Negative for agitation and confusion.            PEx  Temp:  [97.7 °F (36.5 °C)-98.4 °F (36.9 °C)]   Pulse:  []   Resp:  [18-22]   BP: (144-215)/()   SpO2:  [100 %]   Body mass index is 21.63 kg/m².      Constitutional: He is oriented to person, place, and time. No distress.   Lying in bed, no distress. NGT in place with greenish fluid, non-bloody. Vomiting bag at bedside without vomit. Chronically ill appearing.   Cardiovascular: Normal rate, regular rhythm and normal heart sounds.   No murmur heard.  Pulmonary/Chest: Effort normal and breath sounds normal. No respiratory distress.    Abdominal: Soft. Bowel sounds are normal. He exhibits no distension.. There is no rebound. LLQ pain on palpation.    Musculoskeletal: He exhibits no tenderness.   Neurological: He is alert and oriented to person, place, and time.            Intake/Output Summary (Last 24 hours) at 8/22/2018 1412  Last data filed at 8/22/2018 1257      Gross per 24 hour   Intake 1250 ml   Output --   Net 1250 ml            Recent Labs   Lab  08/22/18   0142  08/22/18   0418  08/22/18   1252   WBC  6.65  6.58  5.71   HGB  14.7  13.6*  13.3*   HCT  43.7  40.2  39.4*   PLT  165  160  145*          Recent Labs   Lab  08/22/18   0142   NA  145   K  3.2*   CL  91*   CO2  35*   BUN  37*   CREATININE  8.8*   GLU  107   CALCIUM  11.0*   MG  2.5   LIPASE  32   AMYLASE  149*          Recent Labs   Lab  08/22/18   0142   ALKPHOS  119   ALT  18   AST  28   ALBUMIN  3.9   PROT  9.4*   BILITOT  0.6   INR  1.0      No results for input(s): POCTGLUCOSE in the last 168 hours.      Recent Labs      08/22/18   0142   TROPONINI  0.266*         No results for input(s): LACTATE in the last 72 hours.             Active Hospital Problems     Diagnosis   POA    *Upper GI bleed [K92.2]   Yes    Orthostasis [I95.1]   Yes    Erosive gastritis [K29.60]   Yes    Renovascular hypertension [I15.0]   Yes       Chronic    Hypokalemia [E87.6]   Yes       Chronic    History of Intracerebral Hemorrhage: L BG 5/2013; R BG 9/2016; R BG 11/2016; L caudate head 2/2017 [Z86.79]   Not Applicable       Chronic    History of left below knee amputation 12/18/13 [Z89.512]   Not Applicable       Chronic    ESRD on hemodialysis [N18.6, Z99.2]   Not Applicable       Chronic       MWF at [facility]       Dyslipidemia [E78.5]   Yes       Chronic    Anemia in chronic kidney disease [N18.9, D63.1]   Yes       Chronic    Secondary hyperparathyroidism of renal origin [N25.81]   Yes       Chronic       Resolved Hospital Problems   No resolved problems to display.    EKG - NSR  @ 90bpm. Normal sinus rhythm  Incomplete right bundle branch block  Minimal voltage criteria for LVH,    Imaging Results          X-Ray Abdomen Flat And Erect (Final result)  Result time 08/22/18 03:39:08    Final result by Jatin Duncan MD (08/22/18 03:39:08)                 Impression:      1. Malpositioned enteric catheter, recommend advancing at least 10 cm.  2. Cholelithiasis.      Electronically signed by: Jatin Duncan MD  Date:    08/22/2018  Time:    03:39             Narrative:    EXAMINATION:  XR ABDOMEN FLAT AND ERECT    TECHNIQUE:  Upright and supine abdominal radiographs    COMPARISON:  Dxinqzceom86/18/2018    FINDINGS:  Enteric catheter with tip projected over the distal esophagus, recommend advancing at least 10 cm.  There is scattered gas within nondilated loops of bowel.  No intra-abdominal free air.  Calcifications project over the right upper quadrant, presumably gallstones.  Extensive vascular calcifications are noted.  Lung bases are clear.  Catheter projects over the left lower quadrant, presumably external to the patient.                               X-Ray Chest PA And Lateral (Final result)  Result time 08/22/18 03:35:41    Final result by Jatin Duncan MD (08/22/18 03:35:41)                 Impression:      1. Enteric catheter with tip at the distal esophagus/GE junction.      Electronically signed by: Jatin Duncan MD  Date:    08/22/2018  Time:    03:35             Narrative:    EXAMINATION:  XR CHEST PA AND LATERAL    CLINICAL HISTORY:  Vomiting, unspecified    TECHNIQUE:  PA and lateral views of the chest were performed.    COMPARISON:  Radiograph 05/21/2018.    FINDINGS:  Enteric catheter demonstrates tip at the distal esophagus.  Mediastinal structures are midline.  Hilar contours are unremarkable.  Cardiac silhouette is normal in size.  Lung volumes are symmetric.  No focal consolidation.  No pneumothorax or pleural effusions.  Vascular stent projects over the right  "subclavian region.  No free air beneath the diaphragm.  No acute osseous abnormalities.                                 Overview  Mr. Retana is a 53 yo AAM with HTN, DM2, HFrEF, Esophagitis and gastritis, with Hx of ICH, and L BKA, on iHD on MWF who presented to the hospital on 08/22 with chief compaint of N/V and abdominal pain.  He was recently sent to the hospital last week after he had what was described as "coffee-ground emesis" at his outpatient dialysis unit last week.  He symptoms improved w/o signs of active GI bleed, and he was sent home.  He has been followed by GI during his past admissions with EGDs performed on 05/22, 03/8 and 03/16 all revealing gastritis and esophagitis.  He reports these symptoms have been present x 3 days, with the inability to take his medications at home or eat/drink.  He reports compliance with his dialysis prescription, but does report that he sometimes ends his dialysis treatment due to nausea and vomiting.  In the ED, he underwent abdominal imaging which revealed dilated bowels and NG tube was placed with 600 ml of contents removed which tested positive for heme.  He was also found to be hypertensive with BP >200/100's and was given IV labetalol.  Labs interpretation appears to be hemo-concentrated with H/H of 14.7/43.7, unlikely to be related to JONAS dosing at his dialysis unit.  Chemistries appear to be on the dry side as his sodium is high normal, typically not seen in HD patients, along with new hypercalcemia and a metabolic alkalosis.  He was found to have orthostatic changes with vitals and was administered 1L of NS.  GI evaluated patient at bedside, and they are currently planning on medical treatment with IV Protonix w/o further imaging studies.  Nephrology was consulted for ESRD management.     Assessment and Plan for Problems addressed today:     Coffee ground emesis  · GI states patient may be hemoconcentrated given hgb of 14 while ESRD, or excess dosing of " EPO  · Conservative management and ok to advance to full liquid diet  · Protonix BID IV     Orthostatic HTN  · Persistent after 1L bolus  · HD and hydrate     HTN urgency  · telemonitoring  · Stable, but stopped HTN medication 2/2 orthostatic      CHR diastolic  · Monitor with HTN     Hepatitis C  · Has not been treated     CVA  · No residual effects     Hyperlipidemia  · On a statin     Hypokalemia  · 3.2 (8/22) - replaced            ESRD on hemodialysis     ESRD on dialysis m-w-f  - last dialysis session Monday   - lungs ctab, xr without pulmonary edema. Not currently fluid overloaded on exam.   - K 3.2             DVT PPx:      Provider     I personally scribed for Gavino Cordoba MD on 08/22/2018 at 8:39 PM. Electronically signed by blessing Bowling III on 08/22/2018 at 8:39 PM  The documentation recorded by the scribe accurately reflects service I personally performed and the decisions made by me.  Gavino Cordoba MD  Attending Staff Physician  [facility] Medicine  pager- [phone] [email or web address] - [phone]

## 2018-08-23 NOTE — PLAN OF CARE
Problem: Patient Care Overview  Goal: Plan of Care Review  Pt free of falls and injury. Pt AAOx4. Nausea and vomoting relieved with Ondansetron and Phenergan. High BP controlled with Labetalol PRN.MD notified.Bed low, breaks locked, SR up x2, call light within reach, will continue to monitor.

## 2018-08-23 NOTE — PROGRESS NOTES
Patient BP sustaining elevated 190s/100s after labetalol iv push given. Patient complaining of nausea. Notified IMB regarding bp and also inquired about putting ng tube to suction to relieve nausea; told by NP that he will take a look into notes to see if patient has current ulcer before giving orders for suctioning. Will continue to monitor.

## 2018-08-23 NOTE — MEDICAL/APP STUDENT
Hospital Medicine  Progress note    Team: [facility] MED B Lul Bowling  Admit Date: 8/22/2018  JUAN 8/24/2018  Code status: Full Code    Principal Problem:  Upper GI bleed    Interval hx: Blood and WBC count stable. NG tube out and advancing diet. Abdominal pain has subsided. Still having orthostatic drop in systolic BP, from 170 to 140. On hydralazine and amlodipine currently, will reassess tomorrow.     ROS   Constitutional: Negative for activity change, appetite change, chills and fever.   Respiratory: Negative for chest tightness and shortness of breath.    Cardiovascular: Negative for chest pain.   Gastrointestinal:  Negative for abdominal distention, anal bleeding, blood in stool, constipation and diarrhea.  Genitourinary: Negative for dysuria and hematuria. Little urine made    PEx  Temp:  [98.1 °F (36.7 °C)-98.9 °F (37.2 °C)]   Pulse:  []   Resp:  [12-18]   BP: (124-196)/()   SpO2:  [95 %-99 %]     Intake/Output Summary (Last 24 hours) at 8/23/2018 1658  Last data filed at 8/22/2018 1820  Gross per 24 hour   Intake 1000 ml   Output 0 ml   Net 1000 ml     Constitutional: He is oriented to person, place, and time. No distress.   Lying in bed, no distress. NGT in place with greenish fluid, non-bloody. Vomiting bag at bedside without vomit. Chronically ill appearing.   Cardiovascular: Normal rate, regular rhythm and normal heart sounds.   No murmur heard.  Pulmonary/Chest: Effort normal and breath sounds normal. No respiratory distress.   Abdominal: Soft. Bowel sounds are normal. He exhibits no distension.. There is no rebound. LLQ pain on palpation.    Musculoskeletal: He exhibits no tenderness.   Neurological: He is alert and oriented to person, place, and time.         Recent Labs   Lab  08/23/18   0012  08/23/18   0741  08/23/18   1504   WBC  6.40  10.65  10.81   HGB  13.3*  12.7*  12.2*   HCT  39.7*  37.9*  36.8*   PLT  128*  128*  103*     Recent Labs   Lab  08/22/18   0142  08/23/18   0726   NA   145  138   K  3.2*  4.1   CL  91*  108   CO2  35*  21*   BUN  37*  12   CREATININE  8.8*  5.7*   GLU  107  125*   CALCIUM  11.0*  9.9   MG  2.5  2.0   PHOS   --   3.9   LIPASE  32   --    AMYLASE  149*   --      Recent Labs   Lab  08/22/18   0142  08/23/18   0726   ALKPHOS  119  97   ALT  18  13   AST  28  19   ALBUMIN  3.9  3.2*   PROT  9.4*  7.6   BILITOT  0.6  0.8   INR  1.0   --       No results for input(s): POCTGLUCOSE in the last 168 hours.  Recent Labs      08/22/18 0142   TROPONINI  0.266*       Scheduled Meds:   sodium chloride 0.9%   Intravenous Once    amLODIPine  10 mg Oral Daily    atorvastatin  80 mg Oral QHS    hydrALAZINE  50 mg Oral Q8H    pantoprozole (PROTONIX) 40 mg/100 mL D5W IVPB  40 mg Intravenous Q12H     Continuous Infusions:  As Needed:  acetaminophen, dextrose 50%, dextrose 50%, glucagon (human recombinant), glucose, glucose, ondansetron, sodium chloride 0.9%    Active Hospital Problems    Diagnosis  POA    *Upper GI bleed [K92.2]  Yes    Orthostasis [I95.1]  Yes    Erosive gastritis [K29.60]  Yes    Renovascular hypertension [I15.0]  Yes     Chronic    Hypokalemia [E87.6]  Yes     Chronic    History of Intracerebral Hemorrhage: L BG 5/2013; R BG 9/2016; R BG 11/2016; L caudate head 2/2017 [Z86.79]  Not Applicable     Chronic    History of left below knee amputation 12/18/13 [Z89.512]  Not Applicable     Chronic    ESRD on hemodialysis [N18.6, Z99.2]  Not Applicable     Chronic     MWF at [facility]      Dyslipidemia [E78.5]  Yes     Chronic    Anemia in chronic kidney disease [N18.9, D63.1]  Yes     Chronic    Secondary hyperparathyroidism of renal origin [N25.81]  Yes     Chronic      Resolved Hospital Problems   No resolved problems to display.       Overview  Mr. Retana is a 53 yo AAM with HTN, DM2, HFrEF, Esophagitis and gastritis, with Hx of ICH, and L BKA, on iHD on MWF who presented to the hospital on 08/22 with chief compaint of N/V and abdominal pain.  He  "was recently sent to the hospital last week after he had what was described as "coffee-ground emesis" at his outpatient dialysis unit last week.  He symptoms improved w/o signs of active GI bleed, and he was sent home.  He has been followed by GI during his past admissions with EGDs performed on 05/22, 03/8 and 03/16 all revealing gastritis and esophagitis.  He reports these symptoms have been present x 3 days, with the inability to take his medications at home or eat/drink.  He reports compliance with his dialysis prescription, but does report that he sometimes ends his dialysis treatment due to nausea and vomiting.  In the ED, he underwent abdominal imaging which revealed dilated bowels and NG tube was placed with 600 ml of contents removed which tested positive for heme.  He was also found to be hypertensive with BP >200/100's and was given IV labetalol.  Labs interpretation appears to be hemo-concentrated with H/H of 14.7/43.7, unlikely to be related to JONAS dosing at his dialysis unit.  Chemistries appear to be on the dry side as his sodium is high normal, typically not seen in HD patients, along with new hypercalcemia and a metabolic alkalosis.  He was found to have orthostatic changes with vitals and was administered 1L of NS.  GI evaluated patient at bedside, and they are currently planning on medical treatment with IV Protonix w/o further imaging studies.  Nephrology was consulted for ESRD management.     Assessment and Plan for Problems addressed today:     Coffee ground emesis  · GI states patient may be hemoconcentrated given hgb of 14 while ESRD, or excess dosing of EPO  · Conservative management and ok to advance to full liquid diet  · Protonix BID IV  · No coffee ground emesis     Orthostatic HTN  · Persistent after 1L bolus  · HD and hydrate     HTN urgency  · telemonitoring  · Currently on hydralazine and amlodipine for HTN and orthostatic. Will reassess tomorrow (8/23)      CHR diastolic  · Monitor " with HTN     Hepatitis C  · Has not been treated     CVA  · No residual effects     Hyperlipidemia  · On a statin     Hypokalemia  · 3.2 (8/22) - replaced              ESRD on hemodialysis     ESRD on dialysis m-w-f  - last dialysis session Monday   - lungs ctab, xr without pulmonary edema. Not currently fluid overloaded on exam.   - K 3.2             DVT PPx:      Provider    I personally scribed for Gavino Cordoba MD on 08/23/2018 at 5:03 PM. Electronically signed by blessing Bowling III on 08/23/2018 at 5:03 PM

## 2018-08-23 NOTE — PROGRESS NOTES
NG tube removed. Pt requested to rest now and do orthostatic hypotension blood pressure when lunch comes.

## 2018-08-24 VITALS
SYSTOLIC BLOOD PRESSURE: 156 MMHG | HEART RATE: 97 BPM | HEIGHT: 66 IN | OXYGEN SATURATION: 97 % | BODY MASS INDEX: 21.69 KG/M2 | RESPIRATION RATE: 16 BRPM | TEMPERATURE: 99 F | WEIGHT: 134.94 LBS | DIASTOLIC BLOOD PRESSURE: 83 MMHG

## 2018-08-24 PROBLEM — K92.2 UPPER GI BLEED: Status: RESOLVED | Noted: 2018-08-22 | Resolved: 2018-08-24

## 2018-08-24 PROBLEM — I95.1 ORTHOSTASIS: Status: RESOLVED | Noted: 2018-08-22 | Resolved: 2018-08-24

## 2018-08-24 LAB
ALBUMIN SERPL BCP-MCNC: 3 G/DL
ALP SERPL-CCNC: 99 U/L
ALT SERPL W/O P-5'-P-CCNC: 11 U/L
ANION GAP SERPL CALC-SCNC: 9 MMOL/L
AST SERPL-CCNC: 16 U/L
BASOPHILS # BLD AUTO: 0.01 K/UL
BASOPHILS # BLD AUTO: 0.03 K/UL
BASOPHILS NFR BLD: 0.1 %
BASOPHILS NFR BLD: 0.3 %
BILIRUB SERPL-MCNC: 0.7 MG/DL
BUN SERPL-MCNC: 19 MG/DL
CALCIUM SERPL-MCNC: 10.2 MG/DL
CHLORIDE SERPL-SCNC: 106 MMOL/L
CO2 SERPL-SCNC: 23 MMOL/L
CREAT SERPL-MCNC: 7.6 MG/DL
DIFFERENTIAL METHOD: ABNORMAL
DIFFERENTIAL METHOD: ABNORMAL
EOSINOPHIL # BLD AUTO: 0.6 K/UL
EOSINOPHIL # BLD AUTO: 0.7 K/UL
EOSINOPHIL NFR BLD: 6.2 %
EOSINOPHIL NFR BLD: 9.4 %
ERYTHROCYTE [DISTWIDTH] IN BLOOD BY AUTOMATED COUNT: 14.6 %
ERYTHROCYTE [DISTWIDTH] IN BLOOD BY AUTOMATED COUNT: 14.7 %
EST. GFR  (AFRICAN AMERICAN): 8.5 ML/MIN/1.73 M^2
EST. GFR  (NON AFRICAN AMERICAN): 7.3 ML/MIN/1.73 M^2
GLUCOSE SERPL-MCNC: 81 MG/DL
HCT VFR BLD AUTO: 34.8 %
HCT VFR BLD AUTO: 35.2 %
HGB BLD-MCNC: 12 G/DL
HGB BLD-MCNC: 12 G/DL
IMM GRANULOCYTES # BLD AUTO: 0.02 K/UL
IMM GRANULOCYTES # BLD AUTO: 0.04 K/UL
IMM GRANULOCYTES NFR BLD AUTO: 0.3 %
IMM GRANULOCYTES NFR BLD AUTO: 0.4 %
LYMPHOCYTES # BLD AUTO: 1.2 K/UL
LYMPHOCYTES # BLD AUTO: 2.1 K/UL
LYMPHOCYTES NFR BLD: 17.2 %
LYMPHOCYTES NFR BLD: 20.5 %
MAGNESIUM SERPL-MCNC: 1.9 MG/DL
MCH RBC QN AUTO: 30.5 PG
MCH RBC QN AUTO: 31.2 PG
MCHC RBC AUTO-ENTMCNC: 34.1 G/DL
MCHC RBC AUTO-ENTMCNC: 34.5 G/DL
MCV RBC AUTO: 89 FL
MCV RBC AUTO: 90 FL
MONOCYTES # BLD AUTO: 0.4 K/UL
MONOCYTES # BLD AUTO: 0.8 K/UL
MONOCYTES NFR BLD: 5.9 %
MONOCYTES NFR BLD: 8.2 %
NEUTROPHILS # BLD AUTO: 4.7 K/UL
NEUTROPHILS # BLD AUTO: 6.5 K/UL
NEUTROPHILS NFR BLD: 64.4 %
NEUTROPHILS NFR BLD: 67.1 %
NRBC BLD-RTO: 0 /100 WBC
NRBC BLD-RTO: 0 /100 WBC
PHOSPHATE SERPL-MCNC: 4.3 MG/DL
PLATELET # BLD AUTO: 112 K/UL
PLATELET # BLD AUTO: 119 K/UL
PMV BLD AUTO: 12.3 FL
PMV BLD AUTO: 12.4 FL
POTASSIUM SERPL-SCNC: 4.2 MMOL/L
PROT SERPL-MCNC: 7.5 G/DL
RBC # BLD AUTO: 3.85 M/UL
RBC # BLD AUTO: 3.94 M/UL
SODIUM SERPL-SCNC: 138 MMOL/L
WBC # BLD AUTO: 10.03 K/UL
WBC # BLD AUTO: 6.93 K/UL

## 2018-08-24 PROCEDURE — 90935 HEMODIALYSIS ONE EVALUATION: CPT | Mod: ,,, | Performed by: NURSE PRACTITIONER

## 2018-08-24 PROCEDURE — 36415 COLL VENOUS BLD VENIPUNCTURE: CPT

## 2018-08-24 PROCEDURE — 85025 COMPLETE CBC W/AUTO DIFF WBC: CPT

## 2018-08-24 PROCEDURE — 83735 ASSAY OF MAGNESIUM: CPT

## 2018-08-24 PROCEDURE — 99223 1ST HOSP IP/OBS HIGH 75: CPT | Mod: GC,,, | Performed by: INTERNAL MEDICINE

## 2018-08-24 PROCEDURE — 25000003 PHARM REV CODE 250: Performed by: PHYSICIAN ASSISTANT

## 2018-08-24 PROCEDURE — 80053 COMPREHEN METABOLIC PANEL: CPT

## 2018-08-24 PROCEDURE — 99239 HOSP IP/OBS DSCHRG MGMT >30: CPT | Mod: ,,, | Performed by: HOSPITALIST

## 2018-08-24 PROCEDURE — 90935 HEMODIALYSIS ONE EVALUATION: CPT

## 2018-08-24 PROCEDURE — 25000003 PHARM REV CODE 250: Performed by: NURSE PRACTITIONER

## 2018-08-24 PROCEDURE — 84100 ASSAY OF PHOSPHORUS: CPT

## 2018-08-24 RX ORDER — PANTOPRAZOLE SODIUM 40 MG/1
40 TABLET, DELAYED RELEASE ORAL
Status: DISCONTINUED | OUTPATIENT
Start: 2018-08-24 | End: 2018-08-24 | Stop reason: HOSPADM

## 2018-08-24 RX ADMIN — HYDRALAZINE HYDROCHLORIDE 50 MG: 50 TABLET, FILM COATED ORAL at 05:08

## 2018-08-24 RX ADMIN — SODIUM CHLORIDE: 0.9 INJECTION, SOLUTION INTRAVENOUS at 08:08

## 2018-08-24 RX ADMIN — HYDRALAZINE HYDROCHLORIDE 50 MG: 50 TABLET, FILM COATED ORAL at 02:08

## 2018-08-24 NOTE — PROGRESS NOTES
Hospital Medicine  Progress note     Team: [facility] HOSP MED B Lul Bowling  Admit Date: 8/22/2018  JUAN 8/24/2018  Code status: Full Code     Principal Problem:  Upper GI bleed     Interval hx: Blood and WBC count stable. NG tube out and advancing diet. Abdominal pain has subsided. Still having orthostatic drop in systolic BP, from 170 to 140. On hydralazine and amlodipine currently, will reassess tomorrow.      ROS   Constitutional: Negative for activity change, appetite change, chills and fever.   Respiratory: Negative for chest tightness and shortness of breath.    Cardiovascular: Negative for chest pain.   Gastrointestinal:  Negative for abdominal distention, anal bleeding, blood in stool, constipation and diarrhea.  Genitourinary: Negative for dysuria and hematuria. Little urine made     PEx  Temp:  [98.1 °F (36.7 °C)-98.9 °F (37.2 °C)]   Pulse:  []   Resp:  [12-18]   BP: (124-196)/()   SpO2:  [95 %-99 %]      Intake/Output Summary (Last 24 hours) at 8/23/2018 1658  Last data filed at 8/22/2018 1820      Gross per 24 hour   Intake 1000 ml   Output 0 ml   Net 1000 ml      Constitutional: He is oriented to person, place, and time. No distress.   Lying in bed, no distress. NGT in place with greenish fluid, non-bloody. Vomiting bag at bedside without vomit. Chronically ill appearing.   Cardiovascular: Normal rate, regular rhythm and normal heart sounds.   No murmur heard.  Pulmonary/Chest: Effort normal and breath sounds normal. No respiratory distress.   Abdominal: Soft. Bowel sounds are normal. He exhibits no distension.. There is no rebound. LLQ pain on palpation.    Musculoskeletal: He exhibits no tenderness.   Neurological: He is alert and oriented to person, place, and time.                  Recent Labs   Lab  08/23/18   0012  08/23/18   0741  08/23/18   1504   WBC  6.40  10.65  10.81   HGB  13.3*  12.7*  12.2*   HCT  39.7*  37.9*  36.8*   PLT  128*  128*  103*           Recent Labs   Lab  08/22/18    0142 08/23/18   0726   NA  145  138   K  3.2*  4.1   CL  91*  108   CO2  35*  21*   BUN  37*  12   CREATININE  8.8*  5.7*   GLU  107  125*   CALCIUM  11.0*  9.9   MG  2.5  2.0   PHOS   --   3.9   LIPASE  32   --    AMYLASE  149*   --            Recent Labs   Lab  08/22/18 0142 08/23/18   0726   ALKPHOS  119  97   ALT  18  13   AST  28  19   ALBUMIN  3.9  3.2*   PROT  9.4*  7.6   BILITOT  0.6  0.8   INR  1.0   --       No results for input(s): POCTGLUCOSE in the last 168 hours.      Recent Labs      08/22/18 0142   TROPONINI  0.266*         Scheduled Meds:   sodium chloride 0.9%   Intravenous Once    amLODIPine  10 mg Oral Daily    atorvastatin  80 mg Oral QHS    hydrALAZINE  50 mg Oral Q8H    pantoprozole (PROTONIX) 40 mg/100 mL D5W IVPB  40 mg Intravenous Q12H      Continuous Infusions:  As Needed:  acetaminophen, dextrose 50%, dextrose 50%, glucagon (human recombinant), glucose, glucose, ondansetron, sodium chloride 0.9%            Active Hospital Problems     Diagnosis   POA    *Upper GI bleed [K92.2]   Yes    Orthostasis [I95.1]   Yes    Erosive gastritis [K29.60]   Yes    Renovascular hypertension [I15.0]   Yes       Chronic    Hypokalemia [E87.6]   Yes       Chronic    History of Intracerebral Hemorrhage: L BG 5/2013; R BG 9/2016; R BG 11/2016; L caudate head 2/2017 [Z86.79]   Not Applicable       Chronic    History of left below knee amputation 12/18/13 [Z89.512]   Not Applicable       Chronic    ESRD on hemodialysis [N18.6, Z99.2]   Not Applicable       Chronic       MWF at [facility]       Dyslipidemia [E78.5]   Yes       Chronic    Anemia in chronic kidney disease [N18.9, D63.1]   Yes       Chronic    Secondary hyperparathyroidism of renal origin [N25.81]   Yes       Chronic       Resolved Hospital Problems   No resolved problems to display.         Overview  Mr. Retaan is a 53 yo AAM with HTN, DM2, HFrEF, Esophagitis and gastritis, with Hx of ICH, and L BKA, on iHD on MWF who  "presented to the hospital on 08/22 with chief compaint of N/V and abdominal pain.  He was recently sent to the hospital last week after he had what was described as "coffee-ground emesis" at his outpatient dialysis unit last week.  He symptoms improved w/o signs of active GI bleed, and he was sent home.  He has been followed by GI during his past admissions with EGDs performed on 05/22, 03/8 and 03/16 all revealing gastritis and esophagitis.  He reports these symptoms have been present x 3 days, with the inability to take his medications at home or eat/drink.  He reports compliance with his dialysis prescription, but does report that he sometimes ends his dialysis treatment due to nausea and vomiting.  In the ED, he underwent abdominal imaging which revealed dilated bowels and NG tube was placed with 600 ml of contents removed which tested positive for heme.  He was also found to be hypertensive with BP >200/100's and was given IV labetalol.  Labs interpretation appears to be hemo-concentrated with H/H of 14.7/43.7, unlikely to be related to JONAS dosing at his dialysis unit.  Chemistries appear to be on the dry side as his sodium is high normal, typically not seen in HD patients, along with new hypercalcemia and a metabolic alkalosis.  He was found to have orthostatic changes with vitals and was administered 1L of NS.  GI evaluated patient at bedside, and they are currently planning on medical treatment with IV Protonix w/o further imaging studies.  Nephrology was consulted for ESRD management.     Assessment and Plan for Problems addressed today:     Coffee ground emesis  · GI states patient may be hemoconcentrated given hgb of 14 while ESRD, or excess dosing of EPO  · Conservative management and ok to advance to full liquid diet  · Protonix BID IV  · No coffee ground emesis     Orthostatic HTN  · Persistent after 1.5 L bolus  · HD and hydrate     HTN urgency  · telemonitoring  · Currently on hydralazine and " amlodipine for HTN and orthostatic. Will reassess tomorrow (8/23)      CHR diastolic  · Monitor with HTN     Hepatitis C  · Has not been treated     CVA  · No residual effects     Hyperlipidemia  · On a statin     Hypokalemia  ·  replaced              ESRD on hemodialysis     ESRD on dialysis m-w-f  - last dialysis session Monday   - lungs ctab, xr without pulmonary edema. Not currently fluid overloaded on exam.   - K 3.2             DVT PPx:      Provider     I personally scribed for Gavino Cordoba MD on 08/23/2018 at 5:03 PM. Electronically signed by blessing Bowling III on 08/23/2018 at 5:03 PM  The documentation recorded by the scribe accurately reflects service I personally performed and the decisions made by me.  Gavino Cordoba MD  Attending Staff Physician  [facility] Medicine  pager- [phone]  [facility] - [phone]

## 2018-08-24 NOTE — MEDICAL/APP STUDENT
Discharge Summary  Hospital Medicine    Attending Provider on Discharge: [facility] Medicine Team: [facility] MED B  Date of Admission:  8/22/2018     Date of Discharge:    Code status: Full Code    Active Hospital Problems    Diagnosis  POA    Erosive gastritis [K29.60]  Yes    Renovascular hypertension [I15.0]  Yes     Chronic    Hypokalemia [E87.6]  Yes     Chronic    History of Intracerebral Hemorrhage: L BG 5/2013; R BG 9/2016; R BG 11/2016; L caudate head 2/2017 [Z86.79]  Not Applicable     Chronic    History of left below knee amputation 12/18/13 [Z89.512]  Not Applicable     Chronic    ESRD on hemodialysis [N18.6, Z99.2]  Not Applicable     Chronic     MWF at [facility]      Dyslipidemia [E78.5]  Yes     Chronic    Anemia in chronic kidney disease [N18.9, D63.1]  Yes     Chronic    Secondary hyperparathyroidism of renal origin [N25.81]  Yes     Chronic      Resolved Hospital Problems    Diagnosis Date Resolved POA    *Upper GI bleed [K92.2] 08/24/2018 Yes    Orthostasis [I95.1] 08/24/2018 Yes        HPI  Vaughn Retana is a 53 y.o. male with PMH of HD, CHF, ICH x2, ESRD on HD - M,W,F who presents to the ED with a complaint of abdominal pain and Nausea/vomting starting 2 days ago. He states that he has had multiple episodes of vomiting since the onset. He states he has been vomiting what he thinks is blood, appears orange. He reports associated abdomina discomfort which is constant described as sharp and radiates into the back. The patient denies diarrhea, melanous or bloody stools, fever, chest pain, headache, blurry vision, rhinorrhea. Patient has 7/10 abdominal pain that is sharp and radiates to back. Patient has had back pain that he takes motrin for.      Patient with orthostasis, hold HTN medications.  and lives with niece in [place].    Hospital Course  Mr. Retana is a 51 yo AAM with HTN, DM2, HFrEF, Esophagitis and gastritis, with Hx of ICH, and L BKA, on iHD  "on MWF who presented to the hospital on 08/22 with chief compaint of N/V and abdominal pain.  He was recently sent to the hospital last week after he had what was described as "coffee-ground emesis" at his outpatient dialysis unit last week.  He symptoms improved w/o signs of active GI bleed, and he was sent home.  He has been followed by GI during his past admissions with EGDs performed on 05/22, 03/8 and 03/16 all revealing gastritis and esophagitis.  He reports these symptoms have been present x 3 days, with the inability to take his medications at home or eat/drink.  He reports compliance with his dialysis prescription, but does report that he sometimes ends his dialysis treatment due to nausea and vomiting.  In the ED, he underwent abdominal imaging which revealed dilated bowels and NG tube was placed with 600 ml of contents removed which tested positive for heme.  He was also found to be hypertensive with BP >200/100's and was given IV labetalol.  Labs interpretation appears to be hemo-concentrated with H/H of 14.7/43.7, unlikely to be related to JONAS dosing at his dialysis unit.  Chemistries appear to be on the dry side as his sodium is high normal, typically not seen in HD patients, along with new hypercalcemia and a metabolic alkalosis.  He was found to have orthostatic changes with vitals and was administered 1L of NS.  GI evaluated patient at bedside, and they are currently planning on medical treatment with IV Protonix w/o further imaging studies.  Nephrology was consulted for ESRD management. No blood in stools and orthostatics resolved now. Regular diet now. Patient discharged home.      Recent Labs   Lab  08/23/18   1504  08/24/18   0014  08/24/18   0800   WBC  10.81  10.03  6.93   HGB  12.2*  12.0*  12.0*   HCT  36.8*  34.8*  35.2*   PLT  103*  112*  119*     Recent Labs   Lab  08/22/18   0142  08/23/18   0726  08/24/18   0652   NA  145  138  138   K  3.2*  4.1  4.2   CL  91*  108  106   CO2  35*  21* "  23   BUN  37*  12  19   CREATININE  8.8*  5.7*  7.6*   GLU  107  125*  81   CALCIUM  11.0*  9.9  10.2   MG  2.5  2.0  1.9   PHOS   --   3.9  4.3   LIPASE  32   --    --    AMYLASE  149*   --    --      Recent Labs   Lab  08/22/18   0142  08/23/18   0726  08/24/18   0652   ALKPHOS  119  97  99   ALT  18  13  11   AST  28  19  16   ALBUMIN  3.9  3.2*  3.0*   PROT  9.4*  7.6  7.5   BILITOT  0.6  0.8  0.7   INR  1.0   --    --       No results for input(s): POCTGLUCOSE in the last 168 hours.  Recent Labs      08/22/18   0142   TROPONINI  0.266*       No results for input(s): LACTATE in the last 72 hours.     Procedures: HD    Consultants: Nephrology: Dialysis    Current Discharge Medication List      CONTINUE these medications which have NOT CHANGED    Details   amLODIPine (NORVASC) 10 MG tablet Take 10 mg by mouth once daily.      atorvastatin (LIPITOR) 80 MG tablet Take 80 mg by mouth every evening.      carvedilol (COREG) 25 MG tablet Take 25 mg by mouth 2 (two) times daily with meals.      chlorproMAZINE (THORAZINE) 25 MG tablet Take 25 mg by mouth every 12 (twelve) hours as needed.      famotidine (PEPCID) 40 MG tablet Take 40 mg by mouth daily as needed for Heartburn.      ondansetron (ZOFRAN) 8 MG tablet TAKE 1 TABLET BY MOUTH EVERY 8 HOURS AS NEEDED FOR NAUSEA  Qty: 15 tablet, Refills: 0    Associated Diagnoses: Hiccups      pantoprazole (PROTONIX) 40 MG tablet Take 1 tablet (40 mg total) by mouth 2 (two) times daily before meals.  Qty: 60 tablet, Refills: 11      hydrALAZINE (APRESOLINE) 50 MG tablet Take 50 mg by mouth every 8 (eight) hours.         STOP taking these medications       lisinopril (PRINIVIL,ZESTRIL) 40 MG tablet Comments:   Reason for Stopping:               Discharge Diet:regular diet with Normal Fluid intake of 1500 - 2000 mL per day    Activity: activity as tolerated    Discharge Condition: Good    Disposition: Home or Self Care    Tests pending at the time of discharge: none      Time spent   on the discharge of the patient including review of hospital course with the patient. reviewing discharge medications and arranging follow-up care     Discharge examination Patient was seen and examined on the date of discharge and determined to be suitable for discharge.    Discharge plan and follow up:      Future Appointments   Date Time Provider Department Center   9/12/2018 10:00 AM Opal Mckeon PA-C [facility] GASTRO [facility]       Provider    I personally scribed for Gavino Cordoba MD on 08/24/2018 at 3:01 PM. Electronically signed by blessing Bowling III on 08/24/2018 at 3:01 PM

## 2018-08-24 NOTE — DISCHARGE SUMMARY
Discharge Summary  Hospital Medicine     Attending Provider on Discharge: [facility] Medicine Team: [facility] HOSP MED B  Date of Admission:  8/22/2018     Date of Discharge:    Code status: Full Code            Active Hospital Problems     Diagnosis   POA    Erosive gastritis [K29.60]   Yes    Renovascular hypertension [I15.0]   Yes       Chronic    Hypokalemia [E87.6]   Yes       Chronic    History of Intracerebral Hemorrhage: L BG 5/2013; R BG 9/2016; R BG 11/2016; L caudate head 2/2017 [Z86.79]   Not Applicable       Chronic    History of left below knee amputation 12/18/13 [Z89.512]   Not Applicable       Chronic    ESRD on hemodialysis [N18.6, Z99.2]   Not Applicable       Chronic       MWF at [facility]       Dyslipidemia [E78.5]   Yes       Chronic    Anemia in chronic kidney disease [N18.9, D63.1]   Yes       Chronic    Secondary hyperparathyroidism of renal origin [N25.81]   Yes       Chronic       Resolved Hospital Problems     Diagnosis Date Resolved POA    *Upper GI bleed [K92.2] 08/24/2018 Yes    Orthostasis [I95.1] 08/24/2018 Yes         HPI  Vaughn Retana is a 53 y.o. male with PMH of HD, CHF, ICH x2, ESRD on HD - M,W,F who presents to the ED with a complaint of abdominal pain and Nausea/vomting starting 2 days ago. He states that he has had multiple episodes of vomiting since the onset. He states he has been vomiting what he thinks is blood, appears orange. He reports associated abdomina discomfort which is constant described as sharp and radiates into the back. The patient denies diarrhea, melanous or bloody stools, fever, chest pain, headache, blurry vision, rhinorrhea. Patient has 7/10 abdominal pain that is sharp and radiates to back. Patient has had back pain that he takes motrin for.      Patient with orthostasis, hold HTN medications.  and lives with niece in [place].     Hospital Course  Mr. Retana is a 51 yo AAM with HTN, DM2, HFrEF, Esophagitis and  "gastritis, with Hx of ICH, and L BKA, on iHD on MWF who presented to the hospital on 08/22 with chief compaint of N/V and abdominal pain.  He was recently sent to the hospital last week after he had what was described as "coffee-ground emesis" at his outpatient dialysis unit last week.  He symptoms improved w/o signs of active GI bleed, and he was sent home.  He has been followed by GI during his past admissions with EGDs performed on 05/22, 03/8 and 03/16 all revealing gastritis and esophagitis.  He reports these symptoms have been present x 3 days, with the inability to take his medications at home or eat/drink.  He reports compliance with his dialysis prescription, but does report that he sometimes ends his dialysis treatment due to nausea and vomiting.  In the ED, he underwent abdominal imaging which revealed dilated bowels and NG tube was placed with 600 ml of contents removed which tested positive for heme.  He was also found to be hypertensive with BP >200/100's and was given IV labetalol.  Labs interpretation appears to be hemo-concentrated with H/H of 14.7/43.7, unlikely to be related to JONAS dosing at his dialysis unit.  Chemistries appear to be on the dry side as his sodium is high normal, typically not seen in HD patients, along with new hypercalcemia and a metabolic alkalosis.  He was found to have orthostatic changes with vitals and was administered 1L of NS.  GI evaluated patient at bedside, and they are currently planning on medical treatment with IV Protonix w/o further imaging studies.  Nephrology was consulted for ESRD management. No blood in stools and orthostatics resolved now. Regular diet now. Patient discharged home.       Recent Labs   Lab  08/23/18   1504  08/24/18   0014  08/24/18   0800   WBC  10.81  10.03  6.93   HGB  12.2*  12.0*  12.0*   HCT  36.8*  34.8*  35.2*   PLT  103*  112*  119*            Recent Labs   Lab  08/22/18   0142  08/23/18   0726  08/24/18   0652   NA  145  138  138 "   K  3.2*  4.1  4.2   CL  91*  108  106   CO2  35*  21*  23   BUN  37*  12  19   CREATININE  8.8*  5.7*  7.6*   GLU  107  125*  81   CALCIUM  11.0*  9.9  10.2   MG  2.5  2.0  1.9   PHOS   --   3.9  4.3   LIPASE  32   --    --    AMYLASE  149*   --    --             Recent Labs   Lab  08/22/18   0142  08/23/18   0726  08/24/18   0652   ALKPHOS  119  97  99   ALT  18  13  11   AST  28  19  16   ALBUMIN  3.9  3.2*  3.0*   PROT  9.4*  7.6  7.5   BILITOT  0.6  0.8  0.7   INR  1.0   --    --       No results for input(s): POCTGLUCOSE in the last 168 hours.      Recent Labs      08/22/18   0142   TROPONINI  0.266*         No results for input(s): LACTATE in the last 72 hours.      Procedures: HD     Consultants: Nephrology: Dialysis           Current Discharge Medication List             CONTINUE these medications which have NOT CHANGED     Details   amLODIPine (NORVASC) 10 MG tablet Take 10 mg by mouth once daily.       atorvastatin (LIPITOR) 80 MG tablet Take 80 mg by mouth every evening.       carvedilol (COREG) 25 MG tablet Take 25 mg by mouth 2 (two) times daily with meals.       chlorproMAZINE (THORAZINE) 25 MG tablet Take 25 mg by mouth every 12 (twelve) hours as needed.       famotidine (PEPCID) 40 MG tablet Take 40 mg by mouth daily as needed for Heartburn.       ondansetron (ZOFRAN) 8 MG tablet TAKE 1 TABLET BY MOUTH EVERY 8 HOURS AS NEEDED FOR NAUSEA  Qty: 15 tablet, Refills: 0     Associated Diagnoses: Hiccups       pantoprazole (PROTONIX) 40 MG tablet Take 1 tablet (40 mg total) by mouth 2 (two) times daily before meals.  Qty: 60 tablet, Refills: 11       hydrALAZINE (APRESOLINE) 50 MG tablet Take 50 mg by mouth every 8 (eight) hours.                STOP taking these medications         lisinopril (PRINIVIL,ZESTRIL) 40 MG tablet Comments:   Reason for Stopping:                    Discharge Diet:regular diet with Normal Fluid intake of 1500 - 2000 mL per day     Activity: activity as tolerated     Discharge  Condition: Good     Disposition: Home or Self Care     Tests pending at the time of discharge: none      Time spent  on the discharge of the patient including review of hospital course with the patient. reviewing discharge medications and arranging follow-up care      Discharge examination Patient was seen and examined on the date of discharge and determined to be suitable for discharge.     Discharge plan and follow up:               Future Appointments   Date Time Provider Department Center   9/12/2018 10:00 AM Opal Mckeon PA-C [facility]         Provider     I personally scribed for Gavino Cordoba MD on 08/24/2018 at 3:01 PM. Electronically signed by blessing Bowling III on 08/24/2018 at 3:01 PM   The documentation recorded by the scribe accurately reflects service I personally performed and the decisions made by me.  Gavino Cordoba MD  Attending Staff Physician  [facility] Medicine  pager- [phone]  [facility] - [phone]

## 2018-08-24 NOTE — ASSESSMENT & PLAN NOTE
Mr Retana is a 53 year old man with history of ESRD on HD M/W/F, CHF, ICH, multiple prior evaluations for concern for UGIB (last EGD 5/22 with esophagitis), presenting to the ED with 2 days of LLQ abdominal pain, nausea and non-bloody vomitus. GI was consulted due to concern for hematemesis.     No evidence of active GI bleeding since hospitalized. H&H relatively stable without evidence of bleeding or melena or hematemesis. Initial Hgb greater than would be expected for ESRD patient therefore unclear to what degree this was hemoconcentration and what his baseline is.       Plan  - recommended conservative management  - continue Protonix BID  - please inform GI team of any changes in clinical status

## 2018-08-24 NOTE — PROGRESS NOTES
Patient arrived via stretcher and weighed standing. 0848 Needles inserted to right upper arm fistula without difficulty. Maintenance HD initiated. Patient without complaint. No fluid removal today.

## 2018-08-24 NOTE — SUBJECTIVE & OBJECTIVE
Subjective:     Interval History:   - patient seen and examined  - reports he feels well and is eager to be discharged today  - denies any issues with bleeding since hospitalized, denies any nausea vomiting or diarrhea. Notes last BM was brown.    Review of Systems  Objective:     Vital Signs (Most Recent):  Temp: 98.5 °F (36.9 °C) (08/24/18 1425)  Pulse: 97 (08/24/18 1500)  Resp: 16 (08/24/18 1425)  BP: (!) 156/83 (08/24/18 1446)  SpO2: 97 % (08/24/18 1446) Vital Signs (24h Range):  Temp:  [97.6 °F (36.4 °C)-98.5 °F (36.9 °C)] 98.5 °F (36.9 °C)  Pulse:  [] 97  Resp:  [14-18] 16  SpO2:  [95 %-100 %] 97 %  BP: (132-186)/() 156/83     Weight: 61.2 kg (134 lb 14.7 oz) (08/24/18 0729)  Body mass index is 21.78 kg/m².      Intake/Output Summary (Last 24 hours) at 8/24/2018 1638  Last data filed at 8/24/2018 1219  Gross per 24 hour   Intake 700 ml   Output 660 ml   Net 40 ml       Lines/Drains/Airways     Drain                 Hemodialysis AV Fistula Right upper arm -- days         Hemodialysis AV Fistula Right upper arm -- days         Hemodialysis AV Fistula 03/08/18 1522 Right upper arm 169 days                Physical Exam   Constitutional: He appears well-developed and well-nourished.   Sitting up at side of bed   HENT:   Head: Normocephalic and atraumatic.   Eyes: No scleral icterus.   Pulmonary/Chest: Effort normal. No respiratory distress.   Abdominal: He exhibits no distension. There is no tenderness.   Psychiatric: He has a normal mood and affect. His behavior is normal. Judgment and thought content normal.   Nursing note and vitals reviewed.      Significant Labs:  CBC:   Recent Labs   Lab  08/23/18   1504  08/24/18   0014  08/24/18   0800   WBC  10.81  10.03  6.93   HGB  12.2*  12.0*  12.0*   HCT  36.8*  34.8*  35.2*   PLT  103*  112*  119*     CMP:   Recent Labs   Lab  08/24/18   0652   GLU  81   CALCIUM  10.2   ALBUMIN  3.0*   PROT  7.5   NA  138   K  4.2   CO2  23   CL  106   BUN  19    CREATININE  7.6*   ALKPHOS  99   ALT  11   AST  16   BILITOT  0.7     Coagulation: No results for input(s): PT, INR, APTT in the last 48 hours.      Significant Imaging:

## 2018-08-24 NOTE — PROGRESS NOTES
Pharmacist Intervention IV to PO Note    Vaughn Retana is a 53 y.o. male being treated with IV medication pantoprazole    Patient Data:    Vital Signs (Most Recent):  Temp: 98 °F (36.7 °C) (08/24/18 0718)  Pulse: 92 (08/24/18 1130)  Resp: 18 (08/24/18 0915)  BP: 132/80 (08/24/18 1130)  SpO2: 95 % (08/24/18 0721) Vital Signs (72h Range):  Temp:  [97.6 °F (36.4 °C)-98.9 °F (37.2 °C)]   Pulse:  []   Resp:  [12-22]   BP: (124-215)/()   SpO2:  [95 %-100 %]      CBC:  Recent Labs   Lab  08/23/18   1504  08/24/18   0014  08/24/18   0800   WBC  10.81  10.03  6.93   RBC  4.02*  3.85*  3.94*   HGB  12.2*  12.0*  12.0*   HCT  36.8*  34.8*  35.2*   PLT  103*  112*  119*   MCV  92  90  89   MCH  30.3  31.2*  30.5   MCHC  33.2  34.5  34.1     CMP:     Recent Labs   Lab  08/22/18   0142  08/23/18   0726  08/24/18   0652   GLU  107  125*  81   CALCIUM  11.0*  9.9  10.2   ALBUMIN  3.9  3.2*  3.0*   PROT  9.4*  7.6  7.5   NA  145  138  138   K  3.2*  4.1  4.2   CO2  35*  21*  23   CL  91*  108  106   BUN  37*  12  19   CREATININE  8.8*  5.7*  7.6*   ALKPHOS  119  97  99   ALT  18  13  11   AST  28  19  16   BILITOT  0.6  0.8  0.7       Dietary Orders:  Diet Orders            Diet renal: Renal starting at 08/23 0944            Based on the following criteria, this patient qualifies for intravenous to oral conversion:  [x] The patients gastrointestinal tract is functioning (tolerating medications via oral or enteral route for 24 hours and tolerating food or enteral feeds for 24 hours).  [x] The patient is hemodynamically stable for 24 hours (heart rate <100 beats per minute, systolic blood pressure >99 mm Hg, and respiratory rate <20 breaths per minute).  [x] The patient shows clinical improvement (afebrile for at least 24 hours and white blood cell count downtrending or normalized). Additionally, the patient must be non-neutropenic (absolute neutrophil count >500 cells/mm3).  [x] For antimicrobials, the patient has  received IV therapy for at least 24 hours.    IV medication Pantoprazole 40 mg BID will be changed to oral medication Pantoprazole 40 mg BID    Pharmacist's Name: Tami Josue  Pharmacist's Extension: [id number]

## 2018-08-24 NOTE — PROGRESS NOTES
[facility]  Gastroenterology  Progress Note    Patient Name: Vaughn Retana  MRN: [id number]  Admission Date: 8/22/2018  Hospital Length of Stay: 2 days  Code Status: Full Code   Attending Provider: Gavino Cordoba MD  Consulting Provider: Yimi Jacques MD  Primary Care Physician: Yvette Zelaya NP  Principal Problem: Upper GI bleed      Subjective:     Interval History:   - patient seen and examined  - reports he feels well and is eager to be discharged today  - denies any issues with bleeding since hospitalized, denies any nausea vomiting or diarrhea. Notes last BM was brown.    Review of Systems  Objective:     Vital Signs (Most Recent):  Temp: 98.5 °F (36.9 °C) (08/24/18 1425)  Pulse: 97 (08/24/18 1500)  Resp: 16 (08/24/18 1425)  BP: (!) 156/83 (08/24/18 1446)  SpO2: 97 % (08/24/18 1446) Vital Signs (24h Range):  Temp:  [97.6 °F (36.4 °C)-98.5 °F (36.9 °C)] 98.5 °F (36.9 °C)  Pulse:  [] 97  Resp:  [14-18] 16  SpO2:  [95 %-100 %] 97 %  BP: (132-186)/() 156/83     Weight: 61.2 kg (134 lb 14.7 oz) (08/24/18 0729)  Body mass index is 21.78 kg/m².      Intake/Output Summary (Last 24 hours) at 8/24/2018 1638  Last data filed at 8/24/2018 1219  Gross per 24 hour   Intake 700 ml   Output 660 ml   Net 40 ml       Lines/Drains/Airways     Drain                 Hemodialysis AV Fistula Right upper arm -- days         Hemodialysis AV Fistula Right upper arm -- days         Hemodialysis AV Fistula 03/08/18 1522 Right upper arm 169 days                Physical Exam   Constitutional: He appears well-developed and well-nourished.   Sitting up at side of bed   HENT:   Head: Normocephalic and atraumatic.   Eyes: No scleral icterus.   Pulmonary/Chest: Effort normal. No respiratory distress.   Abdominal: He exhibits no distension. There is no tenderness.   Psychiatric: He has a normal mood and affect. His behavior is normal. Judgment and thought content normal.   Nursing note and vitals  reviewed.      Significant Labs:  CBC:   Recent Labs   Lab  08/23/18   1504  08/24/18   0014  08/24/18   0800   WBC  10.81  10.03  6.93   HGB  12.2*  12.0*  12.0*   HCT  36.8*  34.8*  35.2*   PLT  103*  112*  119*     CMP:   Recent Labs   Lab  08/24/18   0652   GLU  81   CALCIUM  10.2   ALBUMIN  3.0*   PROT  7.5   NA  138   K  4.2   CO2  23   CL  106   BUN  19   CREATININE  7.6*   ALKPHOS  99   ALT  11   AST  16   BILITOT  0.7     Coagulation: No results for input(s): PT, INR, APTT in the last 48 hours.      Significant Imaging:      Assessment/Plan:     Chronic upper GI bleeding     Mr Retana is a 53 year old man with history of ESRD on HD M/W/F, CHF, ICH, multiple prior evaluations for concern for UGIB (last EGD 5/22 with esophagitis), presenting to the ED with 2 days of LLQ abdominal pain, nausea and non-bloody vomitus. GI was consulted due to concern for hematemesis.     No evidence of active GI bleeding since hospitalized. H&H relatively stable without evidence of bleeding or melena or hematemesis. Initial Hgb greater than would be expected for ESRD patient therefore unclear to what degree this was hemoconcentration and what his baseline is.       Plan  - recommended conservative management  - continue Protonix BID  - please inform GI team of any changes in clinical status                   Thank you for your consult. I will sign off. Please contact us if you have any additional questions.    Yimi Jacques MD  Gastroenterology  [facility]

## 2018-08-24 NOTE — PROGRESS NOTES
Pt is getting discharge. D/c instruction explained to pt and pt verbalized understanding. Pt denied any discomfort or pain. Vss. Pt stated that he will catch the bus to go home. Escort is ordered for pt.

## 2018-08-24 NOTE — PLAN OF CARE
Problem: Patient Care Overview  Goal: Plan of Care Review  Outcome: Ongoing (interventions implemented as appropriate)  Patient tolerated 3.5 hour treatment well. +50 ml. Needles pulled. Pressure held X 6 minutes. Hemostasis achieved. Pressure dressings applied. + thrill and bruit noted. Patient without complaint.

## 2018-08-24 NOTE — PLAN OF CARE
Problem: Patient Care Overview  Goal: Plan of Care Review  Outcome: Ongoing (interventions implemented as appropriate)  Pt AAO X 4; able to express needs.  Requires stand by assist for transfers to BSC using walker and prostetic..  IV protonix per orders. Bed in lowest position with wheel locked.  Call light within reach.  Will monitor.

## 2018-08-24 NOTE — PROGRESS NOTES
[facility]     Patient currently on hemodialysis for removal of uremic toxins and volume.     Patient seen and evaluated on hemodialysis, tolerating treatment, see HD flowsheet for vitals and assessments.      Ultrafiltration goal is 0 UF     Labs have been reviewed and the dialysate bath has been adjusted.     Assessment/Plan:  Seen on dialysis this morning, tolerating well In no distress.  He reports feeling "better".  Improvement in nausea/vomiting.  Will continue HD today w/o net UF.  Appears euvolemic on exam        VANITA Valentine, FNP-BC  Nephrology  Pager:  [phone]           "

## 2018-08-27 ENCOUNTER — PATIENT OUTREACH (OUTPATIENT)
Dept: ADMINISTRATIVE | Facility: CLINIC | Age: 53
End: 2018-08-27

## 2018-08-27 NOTE — PHYSICIAN QUERY
PT Name: Vaughn Retana  MR #: [id number]     Physician Query Form - Etiology of Condition Clarification      CDS/: Bonny MORRISON,RN,CDI            Contact information:[phone]    This form is a permanent document in the medical record.     Query Date: August 27, 2018    By submitting this query, we are merely seeking further clarification of documentation.  Please utilize your independent clinical judgment when addressing the question(s) below.     The medical record contains the following:    Findings Supporting Clinical Information Location in Medical Record                    Upper GI Bleed  Mr Retana is a 53 year old man with   history of multiple prior evaluations for   concern for UGIB (last EGD 5/22 with   esophagitis), presenting to the ED with 2   days of LLQ abdominal pain, nausea and   non-bloody vomitus. GI was consulted due to   concern for hematemesis.   On bedside evaluation patient has NGT in   place with green output but no evidence of   hematemesis/coffee-grind emesis. Has   multiple prior EGD (5/22, 3/16, 3/8) for   concern for UGIB with gastritis/esophagitis.   On admission Hgb 14.5 -> 13.6 which is far   above prior value (14 8/5). BM are brown per   patient.   Imp: does not appear patient has an active   UGIB at this time    Chronic Upper GI bleeding  No evidence of active GI bleeding since    hospitalized. H&H relatively stable without    evidence of bleeding or melena or    hematemesis. Initial Hgb greater than would    be expected for ESRD patient therefore    unclear to what degree this was    hemoconcentration and what his baseline is.        GI during his past admissions with EGDs   performed on 05/22, 03/8 and 03/16 all   revealing gastritis and esophagitis.  Principal Diagnosis:  Erosive gastritis                       08/22/18 Gastroenterology Consults                              08/24/18 Gastroenterology Assessment and Plan Note                   08/24/18  Hospital Medicine Discharge Summary     Please document your best medical opinion regarding the etiology of __Erosive  Gastritis_______________ for which the primary focus of treatment is/was directed.         ____ Acute Erosive Gastritis        ___x_Chronic Erosive Gastritis       ____ Other__________________________________                  [    ]  Clinically Undetermined    Please document in your progress notes daily for the duration of treatment, until resolved, and include in your discharge summary.

## 2018-08-27 NOTE — PATIENT INSTRUCTIONS
When You Have Gastrointestinal (GI) Bleeding    Blood in your vomit or stool can be a sign of gastrointestinal (GI) bleeding. GI bleeding can be scary. But the cause may not be serious. You should always see a doctor if GI bleeding occurs.  The GI tract  The GI tract is the path through which food travels in the body. Food passes from the mouth down the esophagus (the tube from the mouth to the stomach). Food begins to break down in the stomach. It then moves through the duodenum, the first part of the small intestine. Nutrients are absorbed as food travels through the small intestine. What is left passes into the colon (large intestine) as waste. The colon removes water from the waste. Waste continues from the colon to the rectum (where stool is stored). Waste then leaves the body through the anus.  Causes of GI bleeding  GI bleeding can be caused by many different problems. Some of the more common causes include:  · Swollen veins in the anus (hemorrhoids)  · Swollen veins in the esophagus (varices)  · Sore on the lining of the GI tract (ulcer)  · Cuts or scrapes in the mouth or throat  · Infection caused by germs such as bacteria or parasites  · Food allergies, such as milk allergy in young children  · Medicines  · Inflammation of the GI tract (gastritis or esophagitis)  · Colitis (Crohn's disease or ulcerative colitis)  · Cancer (tumors or polyps)  · Abnormal pouches in the colon (diverticula)  · Tears in the esophagus or anus  · Nosebleed  · Abnormal blood vessels in the GI tract (angiodysplasia)  Diagnosing the cause of blood in stool  If blood is coming out in your stool, you may have a lower GI tract problem or a very fast upper GI tract bleed. Bleeding from the GI tract can be bright red. Or it may look dark and tarry. Tests may also find blood in your stool that cant be seen with the eye (occult blood). To find out the cause, tests that may be ordered include:  · Blood tests. A blood sample is taken and  sent to a lab for exam.  · Hemoccult test. Checks a stool sample for blood.  · Stool culture. Checks a stool sample for bacteria or parasites.  · X-ray, ultrasound, or CT scan. Imaging tests that take pictures of the digestive tract.  · Colonoscopy or sigmoidoscopy. This test uses a flexible tube with a tiny camera. The tube is inserted through your anus into your rectum to see the inside of your colon. Your provider can also take a tiny tissue sample (biopsy) and treat a bleeding source  Diagnosing the cause of blood in vomit  If you are vomiting blood or something that looks like coffee grounds, you may have an upper GI tract problem. To find the cause, tests that may be done include:  · Upper Endoscopy. A flexible tube with a tiny camera is inserted through your mouth and throat to see inside your upper GI tract. This lets your provider take a tiny tissue sample (biopsy) and treat a bleeding source.  · Nasogastric lavage. This can tell if you have upper GI or lower GI bleeding.  · X-ray, ultrasound, or CT scan. Imaging tests that take pictures of your digestive tract.  · Upper GI series. X-rays of the upper part of your GI tract taken from inside your body.  · Enteroscopy. This sends a flexible tube or a small, swallowed capsule camera into your small intestine.  When to call your healthcare provider  Call your healthcare provider right away if you have any of the following:  · Bleeding from your mouth or anus that can't be stopped  · Fever of 100.4°F (38.0°) or higher  · Bleeding along with feeling lightheaded or dizzy  · Signs of fluid loss (dehydration). These include a dry, sticky mouth, decreased urine output; and very dark urine.  · Belly (abdominal) pain   Date Last Reviewed: 7/1/2016  © 6803-2608 [employer]. [address] [place] [address], [place] [address]. All rights reserved. This information is not intended as a substitute for professional medical care. Always follow your healthcare  professional's instructions.

## 2018-09-12 ENCOUNTER — OFFICE VISIT (OUTPATIENT)
Dept: GASTROENTEROLOGY | Facility: CLINIC | Age: 53
End: 2018-09-12
Payer: MEDICARE

## 2018-09-12 ENCOUNTER — HOSPITAL ENCOUNTER (EMERGENCY)
Facility: HOSPITAL | Age: 53
Discharge: HOME | End: 2018-09-12
Attending: EMERGENCY MEDICINE
Payer: MEDICARE

## 2018-09-12 VITALS
SYSTOLIC BLOOD PRESSURE: 136 MMHG | BODY MASS INDEX: 22.04 KG/M2 | DIASTOLIC BLOOD PRESSURE: 82 MMHG | HEART RATE: 98 BPM | WEIGHT: 137.13 LBS | HEIGHT: 66 IN

## 2018-09-12 VITALS
WEIGHT: 136 LBS | HEIGHT: 66 IN | HEART RATE: 87 BPM | DIASTOLIC BLOOD PRESSURE: 94 MMHG | RESPIRATION RATE: 16 BRPM | OXYGEN SATURATION: 98 % | BODY MASS INDEX: 21.86 KG/M2 | TEMPERATURE: 98 F | SYSTOLIC BLOOD PRESSURE: 187 MMHG

## 2018-09-12 DIAGNOSIS — K21.00 GASTROESOPHAGEAL REFLUX DISEASE WITH ESOPHAGITIS: Primary | ICD-10-CM

## 2018-09-12 DIAGNOSIS — R11.10 VOMITING, INTRACTABILITY OF VOMITING NOT SPECIFIED, PRESENCE OF NAUSEA NOT SPECIFIED, UNSPECIFIED VOMITING TYPE: ICD-10-CM

## 2018-09-12 DIAGNOSIS — R10.9 ABDOMINAL PAIN, UNSPECIFIED ABDOMINAL LOCATION: Primary | ICD-10-CM

## 2018-09-12 DIAGNOSIS — R11.2 NON-INTRACTABLE VOMITING WITH NAUSEA, UNSPECIFIED VOMITING TYPE: ICD-10-CM

## 2018-09-12 LAB
ALBUMIN SERPL BCP-MCNC: 4 G/DL
ALP SERPL-CCNC: 125 U/L
ALT SERPL W/O P-5'-P-CCNC: 16 U/L
ANION GAP SERPL CALC-SCNC: 14 MMOL/L
AST SERPL-CCNC: 31 U/L
BASOPHILS # BLD AUTO: 0.05 K/UL
BASOPHILS NFR BLD: 1 %
BILIRUB SERPL-MCNC: 0.5 MG/DL
BUN SERPL-MCNC: 23 MG/DL
CALCIUM SERPL-MCNC: 10.3 MG/DL
CHLORIDE SERPL-SCNC: 97 MMOL/L
CO2 SERPL-SCNC: 30 MMOL/L
CREAT SERPL-MCNC: 5.9 MG/DL
DIFFERENTIAL METHOD: ABNORMAL
EOSINOPHIL # BLD AUTO: 0.5 K/UL
EOSINOPHIL NFR BLD: 10 %
ERYTHROCYTE [DISTWIDTH] IN BLOOD BY AUTOMATED COUNT: 15.5 %
EST. GFR  (AFRICAN AMERICAN): 11.5 ML/MIN/1.73 M^2
EST. GFR  (NON AFRICAN AMERICAN): 9.9 ML/MIN/1.73 M^2
GLUCOSE SERPL-MCNC: 146 MG/DL
HCT VFR BLD AUTO: 44.9 %
HGB BLD-MCNC: 14.5 G/DL
IMM GRANULOCYTES # BLD AUTO: 0.02 K/UL
IMM GRANULOCYTES NFR BLD AUTO: 0.4 %
LIPASE SERPL-CCNC: 66 U/L
LYMPHOCYTES # BLD AUTO: 1.4 K/UL
LYMPHOCYTES NFR BLD: 25.9 %
MAGNESIUM SERPL-MCNC: 2.9 MG/DL
MCH RBC QN AUTO: 29.5 PG
MCHC RBC AUTO-ENTMCNC: 32.3 G/DL
MCV RBC AUTO: 91 FL
MONOCYTES # BLD AUTO: 0.6 K/UL
MONOCYTES NFR BLD: 11.7 %
NEUTROPHILS # BLD AUTO: 2.7 K/UL
NEUTROPHILS NFR BLD: 51 %
NRBC BLD-RTO: 0 /100 WBC
PHOSPHATE SERPL-MCNC: 4.4 MG/DL
PLATELET # BLD AUTO: 154 K/UL
PMV BLD AUTO: 12.2 FL
POTASSIUM SERPL-SCNC: 4.3 MMOL/L
PROT SERPL-MCNC: 11 G/DL
RBC # BLD AUTO: 4.91 M/UL
SODIUM SERPL-SCNC: 141 MMOL/L
WBC # BLD AUTO: 5.22 K/UL

## 2018-09-12 PROCEDURE — 99999 PR PBB SHADOW E&M-EST. PATIENT-LVL IV: CPT | Mod: PBBFAC,,, | Performed by: PHYSICIAN ASSISTANT

## 2018-09-12 PROCEDURE — 99283 EMERGENCY DEPT VISIT LOW MDM: CPT | Mod: 25,27

## 2018-09-12 PROCEDURE — 80053 COMPREHEN METABOLIC PANEL: CPT

## 2018-09-12 PROCEDURE — 99284 EMERGENCY DEPT VISIT MOD MDM: CPT | Mod: ,,, | Performed by: NURSE PRACTITIONER

## 2018-09-12 PROCEDURE — 83735 ASSAY OF MAGNESIUM: CPT

## 2018-09-12 PROCEDURE — 63600175 PHARM REV CODE 636 W HCPCS: Performed by: EMERGENCY MEDICINE

## 2018-09-12 PROCEDURE — 83690 ASSAY OF LIPASE: CPT

## 2018-09-12 PROCEDURE — 99214 OFFICE O/P EST MOD 30 MIN: CPT | Mod: PBBFAC | Performed by: PHYSICIAN ASSISTANT

## 2018-09-12 PROCEDURE — 96374 THER/PROPH/DIAG INJ IV PUSH: CPT

## 2018-09-12 PROCEDURE — 99214 OFFICE O/P EST MOD 30 MIN: CPT | Mod: S$PBB,,, | Performed by: PHYSICIAN ASSISTANT

## 2018-09-12 PROCEDURE — 85025 COMPLETE CBC W/AUTO DIFF WBC: CPT

## 2018-09-12 PROCEDURE — 84100 ASSAY OF PHOSPHORUS: CPT

## 2018-09-12 RX ORDER — ONDANSETRON 2 MG/ML
4 INJECTION INTRAMUSCULAR; INTRAVENOUS
Status: COMPLETED | OUTPATIENT
Start: 2018-09-12 | End: 2018-09-12

## 2018-09-12 RX ORDER — ONDANSETRON HYDROCHLORIDE 8 MG/1
8 TABLET, FILM COATED ORAL EVERY 8 HOURS PRN
Qty: 40 TABLET | Refills: 0 | Status: SHIPPED | OUTPATIENT
Start: 2018-09-12 | End: 2018-10-02 | Stop reason: SDUPTHER

## 2018-09-12 RX ORDER — HYOSCYAMINE SULFATE 0.12 MG/1
0.12 TABLET SUBLINGUAL EVERY 6 HOURS PRN
Qty: 90 TABLET | Refills: 0 | Status: SHIPPED | OUTPATIENT
Start: 2018-09-12 | End: 2018-09-25

## 2018-09-12 RX ADMIN — ONDANSETRON 4 MG: 2 INJECTION INTRAMUSCULAR; INTRAVENOUS at 02:09

## 2018-09-12 NOTE — PATIENT INSTRUCTIONS
Restart the protonix 40mg twice daily     Take zofran as needed for nausea    Take the levsin under the tongue to help with the frequent belching    Http://www.refluxcookbook.com/  Dropping Acid The Reflux Diet Cookbook and Cure -  Orlando Lincoln M.D.    GERD  Worst Foods for Acid Reflux  Chocolate (milk chocolate worse than dark chocolate)  Soda (all carbonated beverages)  Alcohol (beer, liquor, wine)  Fried foods  Stevens, sausage, ribs  Cream sauce  Fatty meats (beef)  Butter, margarine, lard, shortening  Coffee, tea  Mint   High fat nuts  Hot sauces and pepper  Citrus fruit/juices      Acidic foods (pH - 1 is MORE acidic, 5 is LESS acidic)     Do not eat or drink these (lower numbers are worse)    Induction diet - For 2 weeks eat nothing below pH 5     Lemon juice 2.3  Grape cranberry juice 2.5  Stomach Acid 2.5  Gelatin Dessert 2.6  Lemon/lime 2.9/2.7  Vinegar 2.9  Gatorade 3.0  Fruits - plums, apricots, strawberries, cherries 3.0  Vitamin C (ascorbic acid) 3.0  Iced tea, Snapple 3.1  Mustard 3.2  Soft drinks 3.3  Nectarines 3.3  Pomegranate 3.3  Applesauce 3.4  Grapefruit 3.4  Kiwi 3.4  Barbecue sauce 3.4  Caesar dressing 3.5  [place] island dressing 3.6  Strawberries 3.5  Pineapple juice 3.5  Beer 3.5  Wine 3.5  Grape 3.6  Apples 3.6  Pineapple 3.7  Pickle 3.7  Blackberries, blueberries 3.7  Jamie 3.7  Orange 3.8  Cherries 3.9  Red Bull 3.9  Tomatoes 4.2  Coffee 5.1      These are Safe foods:  Agave  Aloe Vera  Apple (only red)  Bagels  Banana (worsens reflux in 1%)  Beans - black, red, lima, lentils  Bread - whole grain, rye  Caramel  Celery  Chamomile tea  Chicken - skinless, never fried  Chicken stock or bouillon  Coffee - one cup/day with milk  Fennel  Fish  Ginger  Green vegetables (no green peppers)  Herbs  Honey  Melon  Milk - skin, soy, or Lactaid skim milk  Mushrooms  Oatmeal  Olive oil  Parsley  Pasta  Pears  Popcorn  Potatoes  Red bell peppers  Rice  Soups  Tofu  Turkey Breast  Turnip  Vegetables -  no onion, tomatoes, peppers  Vinaigrette  Water - non carbonated  Whole grain breads, crackers, breakfast cereals      Best Foods for Acid Reflux  Whole grain breads  Oatmeal  Aloe Vera  Salad (no tomatoes, onions, cheese, or high fat dressing)  Banana  Melon  Fennel  Chicken and turkey (skinless, never fried)  Fish/seafood (never fried)  Celery  Parsley  Couscous and Rice    Maybe bad foods (Everyone is unique)  Tomatoes  Garlic  Onion  Nuts (macadamia nuts)  Apples (especially green)  Cucumber  Green peppers  Spicy food  Some herbal teas    GERD tips  Change what you eat:  Eat smaller meals  Eat slowly and chew thoroughly until food is almost liquid  Cut down on junk carbohydrates such as sugar and white flour  Use herbs in your cooking  Eat more raw foods (more than 10 ingredients is not a raw food)  Avoid trans fats and partially hydrogenated oils  Eat more fish and switch to grass fed beef  Switch your cooking oil to macadamia nut or olive oil  Watch extremes of salt intake (too high or too low is bad)    If just cutting out acidic foods is not enough, change how you eat:  Large breakfast, medium lunch, light dinner  Dont mix fruit juices, sweet fruits, and refined starches with meats and heavy food  Dont wash your food down with a lot of liquid    List A Proteins - meat, poultry, cheese, eggs, fish, beans, yogurt    List B Neutral - vegetables, salads, seeds, nuts, herbs, cream, butter, olive oil    List C Starches - biscuit, breads, cake, crackers, oats, pasta, potatoes, rice, sugar/honey, sweets    A + B = ok  B + C = ok  Never mix list A and C!!    Change these habits:  Stop smoking  Eat dinner earlier (3-4 hours before lying down to sleep)  Elevate the head of your bed 6 inches (blocks under the head of the bed are better than pillows)  Exercise (but wait 2 hours after eating)  Drink more water (between meals)    Take these supplements:  Multi vitamin  Probiotic  Fish oil    Most common food allergens:  milk, eggs, peanuts, tree nuts, fish, shellfish, wheat, and soy    All natural immediate relief:  Chew 2-3 soft probiotic capsules - Dr. Shaw's Probiotics 12 Plus  Chew chewable DGL licorice tablet  Chew papaya tablet with high protein meal - [employer]  Drink 2 ounces of aloe vera juice  Swedish bitters  Prelief- reduce the acid in food to keep it form burning sensitive tissue  Iberogast  [place]  Drink Chamomile Tea  Teaspoon of baking soda in water  Spoonful of vinegar in water      All natural ulcer healers:  Zinc carnosine - 75.5 mg with food twice a day x 8 weeks   Birdie by Marie - $8 for 60 pills  DGL (deglycyrrhizinated licorice) - 2 tablets before meals. Heals stomach lining   Natural Factors brand, Enzymatic therapy brand.  Aloe Vera juice  - 2 to 8 ounces a day   [place] or [place]

## 2018-09-12 NOTE — ED TRIAGE NOTES
Pt reports nausea and vomiting x 1 days; denies blood in emesis; denies abd pain, diarrhea, CP,urinary symptoms; pt A&Ox4

## 2018-09-12 NOTE — ED PROVIDER NOTES
Encounter Date: 9/12/2018       History     Chief Complaint   Patient presents with    Emesis     Pt reports vomitng that began during dialysis.      54 y/o BM with a history of ESRD (M/W/F), HD, CHF, UGIB, EGD (5/22) and no abdominal surgeries presents to the ED via EMS for nausea and vomiting.  Pt states that after his [facility] appointment this afternoon, he ate a hamburger and afterwards began to vomit.  Due to him not being able to control his symptoms, he came to the ER for treatment.  At the clinic, pt was diagnosed earlier with non-intractable vomiting with nausea, and unspecified vomiting; follow up appointment on 9/14/18.  No medications given by EMS.  Pain is 0/10.  Endorses abdominal pain, nausea and vomiting.  Denies CP,SOB, diarrhea, numbness, weakness, numbness.                      Review of patient's allergies indicates:   Allergen Reactions    Fosrenol [lanthanum] Nausea And Vomiting     Nausea and vomiting     Past Medical History:   Diagnosis Date    Amputation stump pain 9/10/2013    Aspiration pneumonia 7/27/2015    Asterixis 11/8/2016    C. difficile colitis 8/7/2015    Cholelithiasis without obstruction 8/25/2015    Chronic diastolic heart failure     2-23-17   1 - Low normal to mildly depressed left ventricular systolic function (EF 50-55%).    2 - Right ventricular enlargement with mildly depressed systolic function.    3 - Left ventricular diastolic dysfunction.    4 - Right atrial enlargement.    5 - Severe tricuspid regurgitation.    6 - Pulmonary hypertension. The estimated PA systolic pressure is 86 mmHg.    7 - Increased central venous pressure.    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 OR 1ST FLR    COLONOSCOPY      COLONOSCOPY N/A 4/4/2017    Procedure: COLONOSCOPY;  Surgeon: Walker Stern MD;  Location: [facility] (2ND FLR);  Service: Endoscopy;  Laterality: N/A;  PA Systolic Pressure 85.56. HD Patient MWF, K+ lab prior to procedure.     COLONOSCOPY N/A 4/4/2017    Performed by Walker Stern MD at [facility] (2ND FLR)    EGD (ESOPHAGOGASTRODUODENOSCOPY) N/A 6/12/2018    Performed by Man Galicia MD at [facility] (2ND FLR)    ESOPHAGOGASTRODUODENOSCOPY N/A 6/12/2018    Procedure: EGD (ESOPHAGOGASTRODUODENOSCOPY);  Surgeon: Man Galicia MD;  Location: [facility] (2ND FLR);  Service: Endoscopy;  Laterality: N/A;  EGD in 8-12 weeks with Dr. Galicia on 4th floor for follow up erosive esophagitis and Mejia's surveillance.    Patient should be on Pantoprazole 40mg every 12 hours or the equivulant of another PPI    awaiting for patient to reply back regarding changing    ESOPHAGOGASTRODUODENOSCOPY (EGD) N/A 5/22/2018    Performed by Ke Sparks MD at [facility] (2ND FLR)    ESOPHAGOGASTRODUODENOSCOPY (EGD) N/A 3/16/2018    Performed by Kevin De La Paz MD at [facility] (2ND FLR)    ESOPHAGOGASTRODUODENOSCOPY (EGD) N/A 3/8/2018    Performed by Man Galicia MD at [facility] ENDO (2ND FLR)    ESOPHAGOGASTRODUODENOSCOPY (EGD)  N/A 4/4/2017    Performed by Walker Stern MD at [facility] ENDO (2ND FLR)    ESOPHAGOGASTRODUODENOSCOPY (EGD) N/A 10/17/2014    Performed by Man Galicia MD at [facility] ENDO (2ND FLR)    FISTULOGRAM Right 9/18/2014    Performed by Grayson Hubbard MD at [facility] CATH LAB    FOOT AMPUTATION THROUGH METATARSAL      left foot    LEG AMPUTATION THROUGH KNEE  12/18/2013    left BKA    R AVF  9/12/12     Family History   Problem Relation Age of Onset    Early death Mother     Kidney disease Father     Hypertension Father     Heart disease Father     Hyperlipidemia Father     Diabetes Brother     Alcohol abuse Maternal Grandmother     Diabetes Maternal Grandfather     Hypertension Sister     Melanoma Neg Hx      Social History     Tobacco Use    Smoking status: Former Smoker     Packs/day: 1.00     Years: 10.00     Pack years: 10.00    Smokeless tobacco: Never Used   Substance Use Topics    Alcohol use: No    Drug use: No     Review of Systems   Constitutional: Negative for appetite change, diaphoresis and fever.   HENT: Negative for sore throat.    Respiratory: Negative for cough, chest tightness and shortness of breath.    Cardiovascular: Negative for chest pain and leg swelling.   Gastrointestinal: Positive for abdominal pain, nausea and vomiting.   Genitourinary: Negative for dysuria.   Musculoskeletal: Negative for back pain.   Skin: Negative for rash.   Neurological: Negative for weakness, numbness and headaches.   Hematological: Does not bruise/bleed easily.       Physical Exam     Initial Vitals [09/12/18 1128]   BP Pulse Resp Temp SpO2   (!) 170/89 88 16 97.8 °F (36.6 °C) 98 %      MAP       --         Physical Exam    Nursing note and vitals reviewed.  Constitutional: He appears well-developed and well-nourished.   HENT:   Head: Normocephalic and atraumatic.   Eyes: Pupils are equal, round, and reactive to light.   Neck: Normal range of motion.   Cardiovascular: Normal rate, regular rhythm, normal heart  sounds and intact distal pulses. Exam reveals no friction rub.    No murmur heard.  Abdominal: Soft. There is tenderness.   Neurological: He is alert and oriented to person, place, and time.   Skin: Skin is warm and dry. Capillary refill takes less than 2 seconds. No rash noted.   Psychiatric: He has a normal mood and affect.         ED Course   Procedures  Labs Reviewed   CBC W/ AUTO DIFFERENTIAL - Abnormal; Notable for the following components:       Result Value    RDW 15.5 (*)     Eosinophil% 10.0 (*)     All other components within normal limits   COMPREHENSIVE METABOLIC PANEL   LIPASE   LIPASE   URINALYSIS, REFLEX TO URINE CULTURE   MAGNESIUM   PHOSPHORUS          Imaging Results    None                APC / Resident Notes:   54 y/o BM with a history of ESRD (M/W/F), HD, CHF, UGIB, EGD (5/22) and no abdominal surgeries presents to the ED via EMS for nausea and vomiting.  VSS.  TTP in the abdominal area.  Abdomen is soft and flat, no distension.  Afebrile, RRR, and lungs CTA.  Radial pulses 2+; sensation intact.  Fistual in the right bicep; thrill present.  I do not suspect cholecystitis, ulcer, or infection.  DDx includes but is not limited to abdominal pain, uncontrolled vomiting, food poisoning, GERD and gastritis.  Will obtain labs, give medications, and reassess.    1400  Potassium: 4.3, Lipase: 66, Magnesium: 2.9, Phosphorus: 4.4, WBC: 5.22    No imaging or ECG     1500  Pt states his nausea and vomiting are not present; pain continues to be 0/10.  Will not give any fluids or additional medications.  We do not have an indication for further labs or initiate imaging at this time, but have advised close follow up with his primary cre physician and may require further treatment if condition continues.  We will send home with no meds.  Pt is comfortable with this plan.     MERRY Franklin    Pt's evaluation is most consistent with abdominal pain.  Pt's labs reveal a potassium of 4.3 and phosphorus of  "4.4; normal labs.  As the pt has no additional symptoms or other complaints, we find he is stable for discharge and he agrees with the plan.       3:36 PM  Pt is alert, in no distress, sitting up watching television.  Pt states "I feel better and like my ice chips."  I repeated his abdominal examination: abdomen soft and flat, non-distended.  No tenderness or guarding present.  Bowel sounds heard on ausculation.             Attending Attestation:     Physician Attestation Statement for NP/PA:   I discussed this assessment and plan of this patient with the NP/PA, but I did not personally examine the patient. The face to face encounter was performed by the NP/PA.                     Clinical Impression:   The primary encounter diagnosis was Abdominal pain, unspecified abdominal location. A diagnosis of Vomiting, intractability of vomiting not specified, presence of nausea not specified, unspecified vomiting type was also pertinent to this visit.      Disposition:   Disposition: Discharged  Condition: Stable                        Elgin Ramsey III, NP  09/12/18 1637       Nando Cheek MD  09/16/18 1119    "

## 2018-09-12 NOTE — ED PROVIDER NOTES
Encounter Date: 9/12/2018       History     Chief Complaint   Patient presents with    Emesis     Pt reports vomitng that began during dialysis.      HPI  Review of patient's allergies indicates:   Allergen Reactions    Fosrenol [lanthanum] Nausea And Vomiting     Nausea and vomiting     Past Medical History:   Diagnosis Date    Amputation stump pain 9/10/2013    Aspiration pneumonia 7/27/2015    Asterixis 11/8/2016    C. difficile colitis 8/7/2015    Cholelithiasis without obstruction 8/25/2015    Chronic diastolic heart failure     2-23-17   1 - Low normal to mildly depressed left ventricular systolic function (EF 50-55%).    2 - Right ventricular enlargement with mildly depressed systolic function.    3 - Left ventricular diastolic dysfunction.    4 - Right atrial enlargement.    5 - Severe tricuspid regurgitation.    6 - Pulmonary hypertension. The estimated PA systolic pressure is 86 mmHg.    7 - Increased central venous pressure.    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    Metabolic acidosis, IAG, reduced excretion of inorganic acids     Myoclonic jerking 9/20/2016    Secondary hyperparathyroidism (of renal origin)     Secondary pulmonary hypertension 3/23/2017    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    TB lung, latent 08/25/2015    Negative Quantiferon Gold 3-23-17     Past Surgical History:   Procedure Laterality Date    AMPUTATION, BELOW KNEE Left 12/18/2013    Performed by Elgin DIAZ  MD Celso at [facility] OR 1ST FLR    COLONOSCOPY      COLONOSCOPY N/A 4/4/2017    Procedure: COLONOSCOPY;  Surgeon: Walker Stern MD;  Location: [facility] (2ND FLR);  Service: Endoscopy;  Laterality: N/A;  PA Systolic Pressure 85.56. HD Patient MWF, K+ lab prior to procedure.     COLONOSCOPY N/A 4/4/2017    Performed by Walker Stern MD at [facility] (2ND FLR)    EGD (ESOPHAGOGASTRODUODENOSCOPY) N/A 6/12/2018    Performed by Man Galicia MD at [facility] ENDO (2ND FLR)    ESOPHAGOGASTRODUODENOSCOPY N/A 6/12/2018    Procedure: EGD (ESOPHAGOGASTRODUODENOSCOPY);  Surgeon: Man Galicia MD;  Location: [facility] (2ND FLR);  Service: Endoscopy;  Laterality: N/A;  EGD in 8-12 weeks with Dr. Galicia on 4th floor for follow up erosive esophagitis and Mejia's surveillance.    Patient should be on Pantoprazole 40mg every 12 hours or the equivulant of another PPI    awaiting for patient to reply back regarding changing    ESOPHAGOGASTRODUODENOSCOPY (EGD) N/A 5/22/2018    Performed by Ke Sparks MD at [facility] (2ND FLR)    ESOPHAGOGASTRODUODENOSCOPY (EGD) N/A 3/16/2018    Performed by Kevin De La Paz MD at [facility] (2ND FLR)    ESOPHAGOGASTRODUODENOSCOPY (EGD) N/A 3/8/2018    Performed by Man Galicia MD at [facility] ENDO (2ND FLR)    ESOPHAGOGASTRODUODENOSCOPY (EGD) N/A 4/4/2017    Performed by Walker Stern MD at [facility] (2ND FLR)    ESOPHAGOGASTRODUODENOSCOPY (EGD) N/A 10/17/2014    Performed by Man Galicia MD at [facility] (2ND FLR)    FISTULOGRAM Right 9/18/2014    Performed by Grayson Hubbard MD at [facility] CATH LAB    FOOT AMPUTATION THROUGH METATARSAL      left foot    LEG AMPUTATION THROUGH KNEE  12/18/2013    left BKA    R AVF  9/12/12     Family History   Problem Relation Age of Onset    Early death Mother     Kidney disease Father     Hypertension Father     Heart disease Father     Hyperlipidemia Father     Diabetes Brother     Alcohol abuse Maternal Grandmother     Diabetes Maternal  Grandfather     Hypertension Sister     Melanoma Neg Hx      Social History     Tobacco Use    Smoking status: Former Smoker     Packs/day: 1.00     Years: 10.00     Pack years: 10.00    Smokeless tobacco: Never Used   Substance Use Topics    Alcohol use: No    Drug use: No     Review of Systems    Physical Exam     Initial Vitals [09/12/18 1128]   BP Pulse Resp Temp SpO2   (!) 170/89 88 16 97.8 °F (36.6 °C) 98 %      MAP       --         Physical Exam    ED Course   Procedures  Labs Reviewed   CBC W/ AUTO DIFFERENTIAL   COMPREHENSIVE METABOLIC PANEL   LIPASE          Imaging Results    None                       Attending Attestation:     Physician Attestation Statement for NP/PA:   I discussed this assessment and plan of this patient with the NP/PA, but I did not personally examine the patient. The face to face encounter was performed by the NP/PA.                     Clinical Impression:   There were no encounter diagnoses.

## 2018-09-12 NOTE — ED NOTES
"Pt tolerated ice chips prior to self-induced vomiting; pt refusing to stop PO intake, refusing phenergan; when asked why pt induced vomiting, pt states "I don't know, I just did"; provider aware; no new orders received; will continue to monitor and provide care   "

## 2018-09-12 NOTE — PROGRESS NOTES
[facility] Consultation Note    Reason for Consult:  The primary encounter diagnosis was Gastroesophageal reflux disease with esophagitis. A diagnosis of Non-intractable vomiting with nausea, unspecified vomiting type was also pertinent to this visit.    PCP:   Yvette Zelaya       Referring MD:  No referring provider defined for this encounter.    HPI:  This is a 53 y.o. male here for evaluation of GERD  Here for a hospital F/u     8/22/18 admision notes  Mr Retana is a 53 year old man with history of ESRD on HD M/W/F, CHF, ICH, multiple prior evaluations for concern for UGIB (last EGD 5/22 with esophagitis), presenting to the ED with 2 days of LLQ abdominal pain, nausea and non-bloody vomitus. GI was consulted due to concern for hematemesis.     Patient reports that he was well until ~2 days ago when he developped LLQ (1/10) abdominal pain, nausea and vomitus. He notes that the vomitus was food containing and non-bloody, but remarks it may have appeared coffee-grind. He notes that he has not had any issues with PO intake or dysphagia or early satiety. He denies any changes in bowel habitus, last BM yesterday was brown, no melena. Denies any NSAID or anticoagulant use. In the ED an NGT was placed (KUB dilated bowel loops) and patient noted improvement in symptoms thereafter.    On admission Hgb 14.5 -> 13.6 which is far above prior value (14 8/5). BM are brown per patient.     Imp: does not appear patient has an active UGIB at this time     Plan  - may be hemoconcentrated given hgb of 14 while ESRD, though was 14 on 8/15 or could be excessive dosing of epo as outpatient.  - recommended conservative management  - ok to advance to full liquid diet  - continue Protonix BID IV  - trend H&H    5/22/18 EGD- Small hiatal hernia.                        - LA Grade D reflux esophagitis.                        - Normal stomach.                        - Normal examined duodenum.                         - No specimens collected.    He was started nprotonix 40mg BID    Interval Hx  Today he  Has intractable belching in office  He reports Vomiting once a day  He says he is not taking the protonix because he thinks he ran out of medicine  Denies melena or abdominal pain    While being checked out for his visit today threw up. He says he feels very weak. He was escorted to to the ED    ROS:  Constitutional: No fevers, chills, No weight loss  ENT: No allergies  CV: No chest pain  Pulm: No cough, No shortness of breath  Ophtho: No vision changes  GI: see HPI  Derm: No rash  Heme: No lymphadenopathy, No bruising  MSK: No arthritis  : No dysuria, No hematuria  Endo: No hot or cold intolerance  Neuro: No syncope, No seizure  Psych: No anxiety, No depression    Medical History:  has a past medical history of Amputation stump pain (9/10/2013), Aspiration pneumonia (7/27/2015), Asterixis (11/8/2016), C. 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    Surgical History:  has a past surgical history that includes R AVF (9/12/12); Leg amputation through knee (12/18/2013); Foot  amputation through metatarsal; Colonoscopy; Upper gastrointestinal endoscopy; EGD (ESOPHAGOGASTRODUODENOSCOPY) (N/A, 6/12/2018); ESOPHAGOGASTRODUODENOSCOPY (EGD) (N/A, 5/22/2018); ESOPHAGOGASTRODUODENOSCOPY (EGD) (N/A, 3/16/2018); ESOPHAGOGASTRODUODENOSCOPY (EGD) (N/A, 3/8/2018); ESOPHAGOGASTRODUODENOSCOPY (EGD) (N/A, 4/4/2017); COLONOSCOPY (N/A, 4/4/2017); ESOPHAGOGASTRODUODENOSCOPY (EGD) (N/A, 10/17/2014); FISTULOGRAM (Right, 9/18/2014); and AMPUTATION, BELOW KNEE (Left, 12/18/2013).    Family History: family history includes Alcohol abuse in his maternal grandmother; Diabetes in his brother and maternal grandfather; Early death in his mother; Heart disease in his father; Hyperlipidemia in his father; Hypertension in his father and sister; Kidney disease in his father..     Social History:  reports that he has quit smoking. He has a 10.00 pack-year smoking history. he has never used smokeless tobacco. He reports that he does not drink alcohol or use drugs.    Review of patient's allergies indicates:   Allergen Reactions    Fosrenol [lanthanum] Nausea And Vomiting     Nausea and vomiting       No current facility-administered medications on file prior to visit.      Current Outpatient Medications on File Prior to Visit   Medication Sig Dispense Refill    amLODIPine (NORVASC) 10 MG tablet Take 10 mg by mouth once daily.      atorvastatin (LIPITOR) 80 MG tablet Take 80 mg by mouth every evening.      carvedilol (COREG) 25 MG tablet Take 25 mg by mouth 2 (two) times daily with meals.      chlorproMAZINE (THORAZINE) 25 MG tablet Take 25 mg by mouth every 12 (twelve) hours as needed.      famotidine (PEPCID) 40 MG tablet Take 40 mg by mouth daily as needed for Heartburn.      hydrALAZINE (APRESOLINE) 50 MG tablet Take 50 mg by mouth every 8 (eight) hours.      pantoprazole (PROTONIX) 40 MG tablet Take 1 tablet (40 mg total) by mouth 2 (two) times daily before meals. 60 tablet 11    [DISCONTINUED] ondansetron  "(ZOFRAN) 8 MG tablet TAKE 1 TABLET BY MOUTH EVERY 8 HOURS AS NEEDED FOR NAUSEA 15 tablet 0         Objective Findings:    Vital Signs:  /82   Pulse 98   Ht 5' 6" (1.676 m)   Wt 62.2 kg (137 lb 2 oz)   BMI 22.13 kg/m²   Body mass index is 22.13 kg/m².    Physical Exam:  General Appearance: Well appearing in no acute distress, actively belching intracably  Head:   Normocephalic, without obvious abnormality  Eyes:    No scleral icterus  ENT: Neck supple, Lips, mucosa, and tongue normal  Lungs: CTA bilaterally in anterior and posterior fields, no wheezes, no crackles.  Heart:  Regular rate and rhythm, S1, S2 normal, no murmurs heard  Abdomen: Soft, non tender, non distended with positive bowel sounds in all four quadrants.   Extremities: left lower extremity prosthesis  Skin: No rash  Neurologic: AAO x 3      Labs:  Lab Results   Component Value Date    WBC 5.22 09/12/2018    HGB 14.5 09/12/2018    HCT 44.9 09/12/2018     09/12/2018    CHOL 161 09/09/2017    TRIG 110 09/09/2017    HDL 50 09/09/2017    ALT 16 09/12/2018    AST 31 09/12/2018     09/12/2018    K 4.3 09/12/2018    CL 97 09/12/2018    CREATININE 5.9 (H) 09/12/2018    BUN 23 (H) 09/12/2018    CO2 30 (H) 09/12/2018    TSH 0.086 (L) 04/04/2018    PSA 2.7 06/02/2015    INR 1.0 08/22/2018    HGBA1C 4.5 08/22/2018       Assessment:  1. Gastroesophageal reflux disease with esophagitis    2. Non-intractable vomiting with nausea, unspecified vomiting type       54yo M with Hx of frequent vomiting, found to have reflux esophagitis on multiple EGDs. Currently having inrtractable belching and vomited in office today. He has not been taking his PPI lately.    Given his GERD, N/V. Need to rule out gastroparesis    Pt was sent to the ED today for control of his emesis and IV fluids    Recommendations:  1. Schedule EGD to confirm healing of esophagitis    2.  Restart protonix 40mg BID. Once healing confirmed then decrease to once daily.     3. trial " of levsin prn for intractable belching    4. zofran prn for nausea    5. GES to rule out gastroparesis    Follow-up in about 6 weeks (around 10/24/2018).                            Order summary:  Orders Placed This Encounter    NM Gastric Emptying    ondansetron (ZOFRAN) 8 MG tablet    hyoscyamine (LEVSIN/SL) 0.125 mg Subl    Case request GI: EGD (ESOPHAGOGASTRODUODENOSCOPY)         Thank you so much for allowing me to participate in the care of Vaughn Mckeon PA-C

## 2018-09-21 ENCOUNTER — TELEPHONE (OUTPATIENT)
Dept: ENDOSCOPY | Facility: HOSPITAL | Age: 53
End: 2018-09-21

## 2018-09-24 ENCOUNTER — TELEPHONE (OUTPATIENT)
Dept: GASTROENTEROLOGY | Facility: CLINIC | Age: 53
End: 2018-09-24

## 2018-09-25 ENCOUNTER — TELEPHONE (OUTPATIENT)
Dept: GASTROENTEROLOGY | Facility: CLINIC | Age: 53
End: 2018-09-25

## 2018-09-25 RX ORDER — DICYCLOMINE HYDROCHLORIDE 10 MG/1
10 CAPSULE ORAL
Qty: 90 CAPSULE | Refills: 0 | Status: SHIPPED | OUTPATIENT
Start: 2018-09-25 | End: 2018-10-02 | Stop reason: SDUPTHER

## 2018-09-26 NOTE — TELEPHONE ENCOUNTER
Attempted to notify patient that new Rx has been sent to pharmacy with no success.    Left message for patient to call clinic.

## 2018-10-02 ENCOUNTER — OFFICE VISIT (OUTPATIENT)
Dept: INTERNAL MEDICINE | Facility: CLINIC | Age: 53
End: 2018-10-02
Payer: MEDICARE

## 2018-10-02 VITALS
DIASTOLIC BLOOD PRESSURE: 98 MMHG | WEIGHT: 134.94 LBS | SYSTOLIC BLOOD PRESSURE: 164 MMHG | HEART RATE: 67 BPM | OXYGEN SATURATION: 99 % | HEIGHT: 66 IN | BODY MASS INDEX: 21.69 KG/M2

## 2018-10-02 DIAGNOSIS — N18.6 ESRD ON HEMODIALYSIS: Chronic | ICD-10-CM

## 2018-10-02 DIAGNOSIS — R11.2 NON-INTRACTABLE VOMITING WITH NAUSEA, UNSPECIFIED VOMITING TYPE: ICD-10-CM

## 2018-10-02 DIAGNOSIS — N18.9 CHRONIC KIDNEY DISEASE-MINERAL AND BONE DISORDER: Chronic | ICD-10-CM

## 2018-10-02 DIAGNOSIS — Z89.512 HISTORY OF LEFT BELOW KNEE AMPUTATION: Chronic | ICD-10-CM

## 2018-10-02 DIAGNOSIS — K29.60 EROSIVE GASTRITIS: ICD-10-CM

## 2018-10-02 DIAGNOSIS — Z12.5 SCREENING FOR MALIGNANT NEOPLASM OF PROSTATE: ICD-10-CM

## 2018-10-02 DIAGNOSIS — E11.22 CONTROLLED TYPE 2 DIABETES MELLITUS WITH CHRONIC KIDNEY DISEASE, WITHOUT LONG-TERM CURRENT USE OF INSULIN, UNSPECIFIED CKD STAGE: ICD-10-CM

## 2018-10-02 DIAGNOSIS — Z11.4 ENCOUNTER FOR SCREENING FOR HIV: ICD-10-CM

## 2018-10-02 DIAGNOSIS — E78.5 DYSLIPIDEMIA: Chronic | ICD-10-CM

## 2018-10-02 DIAGNOSIS — R63.4 WEIGHT LOSS: Primary | ICD-10-CM

## 2018-10-02 DIAGNOSIS — I15.0 RENOVASCULAR HYPERTENSION: Chronic | ICD-10-CM

## 2018-10-02 DIAGNOSIS — E83.9 CHRONIC KIDNEY DISEASE-MINERAL AND BONE DISORDER: Chronic | ICD-10-CM

## 2018-10-02 DIAGNOSIS — Z99.2 ESRD ON HEMODIALYSIS: Chronic | ICD-10-CM

## 2018-10-02 DIAGNOSIS — M89.9 CHRONIC KIDNEY DISEASE-MINERAL AND BONE DISORDER: Chronic | ICD-10-CM

## 2018-10-02 DIAGNOSIS — Z86.79 HISTORY OF SPONTANEOUS INTRAPARENCHYMAL INTRACRANIAL HEMORRHAGE ASSOCIATED WITH HYPERTENSION: Chronic | ICD-10-CM

## 2018-10-02 PROCEDURE — 99999 PR PBB SHADOW E&M-EST. PATIENT-LVL IV: CPT | Mod: PBBFAC,,, | Performed by: NURSE PRACTITIONER

## 2018-10-02 PROCEDURE — 99214 OFFICE O/P EST MOD 30 MIN: CPT | Mod: PBBFAC | Performed by: NURSE PRACTITIONER

## 2018-10-02 PROCEDURE — 99214 OFFICE O/P EST MOD 30 MIN: CPT | Mod: S$PBB,,, | Performed by: NURSE PRACTITIONER

## 2018-10-02 RX ORDER — DICYCLOMINE HYDROCHLORIDE 10 MG/1
10 CAPSULE ORAL
Qty: 90 CAPSULE | Refills: 0 | Status: ON HOLD | OUTPATIENT
Start: 2018-10-02 | End: 2019-01-01 | Stop reason: HOSPADM

## 2018-10-02 RX ORDER — B,C/FOLIC/ZINC/SELENOMETH/D3/E 0.8-12.5MG
1 TABLET ORAL DAILY
Refills: 3 | Status: ON HOLD | COMMUNITY
Start: 2018-08-23 | End: 2020-01-01 | Stop reason: HOSPADM

## 2018-10-02 RX ORDER — ATORVASTATIN CALCIUM 80 MG/1
80 TABLET, FILM COATED ORAL NIGHTLY
Qty: 90 TABLET | Refills: 1 | Status: ON HOLD | OUTPATIENT
Start: 2018-10-02 | End: 2019-01-01 | Stop reason: HOSPADM

## 2018-10-02 RX ORDER — CARVEDILOL 25 MG/1
25 TABLET ORAL 2 TIMES DAILY WITH MEALS
Qty: 180 TABLET | Refills: 1 | Status: ON HOLD | OUTPATIENT
Start: 2018-10-02 | End: 2019-01-01 | Stop reason: HOSPADM

## 2018-10-02 RX ORDER — SEVELAMER CARBONATE 800 MG/1
TABLET, FILM COATED ORAL
Refills: 6 | Status: ON HOLD | COMMUNITY
Start: 2018-09-18 | End: 2019-01-01 | Stop reason: HOSPADM

## 2018-10-02 RX ORDER — ONDANSETRON HYDROCHLORIDE 8 MG/1
8 TABLET, FILM COATED ORAL EVERY 8 HOURS PRN
Qty: 40 TABLET | Refills: 0 | Status: ON HOLD | OUTPATIENT
Start: 2018-10-02 | End: 2019-01-01 | Stop reason: SDUPTHER

## 2018-10-02 RX ORDER — CHLORPROMAZINE HYDROCHLORIDE 25 MG/1
25 TABLET, FILM COATED ORAL EVERY 12 HOURS PRN
Qty: 180 TABLET | Refills: 1 | Status: ON HOLD | OUTPATIENT
Start: 2018-10-02 | End: 2019-03-07 | Stop reason: HOSPADM

## 2018-10-02 RX ORDER — FAMOTIDINE 40 MG/1
40 TABLET, FILM COATED ORAL 2 TIMES DAILY
Qty: 180 TABLET | Refills: 1 | Status: ON HOLD | OUTPATIENT
Start: 2018-10-02 | End: 2018-12-05 | Stop reason: HOSPADM

## 2018-10-02 RX ORDER — HYDRALAZINE HYDROCHLORIDE 50 MG/1
50 TABLET, FILM COATED ORAL EVERY 12 HOURS
Qty: 180 TABLET | Refills: 1 | Status: ON HOLD | OUTPATIENT
Start: 2018-10-02 | End: 2019-03-07 | Stop reason: HOSPADM

## 2018-10-02 RX ORDER — AMLODIPINE BESYLATE 10 MG/1
10 TABLET ORAL DAILY
Qty: 90 TABLET | Refills: 1 | Status: ON HOLD | OUTPATIENT
Start: 2018-10-02 | End: 2019-06-24 | Stop reason: HOSPADM

## 2018-10-02 NOTE — PROGRESS NOTES
Internal Medicine Annual Exam       CHIEF COMPLAINT     The patient, Vaughn Retana, who is a 53 y.o. male  with DMII, HTN, HLD, ESRD on HD, chronic diastolic failure, esophagitis, h/o C. Dif, head trauma  presents for an annual exam.    HPI   Pt needs PCP. Pt is noncompliant with meds.     CVA in July 2017 - hemorrhagic; h/o noncompliance with antihypertensive medications.      HTN- not complaint with amlodipine and coreg and hydralazine       CKD stage V/ESRD- on HD MWF fistula to the Right Arm.   Followed by Nephrology     DM- last A1c 4.5 (8/2018) fasting glucose 146 (8/2018).   Left AKA from complications of DM ulceration  Followed by podaitry last foot exam 3/2018 - over due for 3 month follo wup   Eye- >1 year     CHF- diastolic; see echo results 2/2017. Not taking coreg or lipitor      HLD- at goal 9/2017. Stopped taking lipitor in September.      Hiccups/belching- intractable. Used thorazine in the past- stopped last month     Since last annual 1 year ago-   -hospitalized 3/22/2018 with esophagitis and GI bleed. EGD 4/2/2018 - bx negative.   - hospitalized 4/3/2018 with    -5/21/2018 with erosive gastritis -   -8/15/2018 with brown vomitus - ED visit   -8/22/2018 admitted for upper GI bleed. Follow up with GI 9/12/2018- vomited in office. Returned to the ED that day.     Intractable vomiting- last ED visit 9/12/2018- taking zofran as needed for nausea. Needs to reschedule EGD with GI. Did not  bentyl as directed. Pt eating jens and eggs for breakfast, hot sausage or fried shrimp poboy for lunch, sister will cook dinner.     HM-   Flu- done in ED   C-scope - UTD   Foot- UTD   Needs EYE exam.   TDAp- unsure     Past Medical History:  Past Medical History:   Diagnosis Date    Amputation stump pain 9/10/2013    Aspiration pneumonia 7/27/2015    Asterixis 11/8/2016    C. difficile colitis 8/7/2015    Cholelithiasis without obstruction 8/25/2015    Chronic diastolic heart failure     2-23-17   1 -  Low normal to mildly depressed left ventricular systolic function (EF 50-55%).    2 - Right ventricular enlargement with mildly depressed systolic function.    3 - Left ventricular diastolic dysfunction.    4 - Right atrial enlargement.    5 - Severe tricuspid regurgitation.    6 - Pulmonary hypertension. The estimated PA systolic pressure is 86 mmHg.    7 - Increased central venous pressure.    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 OR 1ST FLR    COLONOSCOPY      COLONOSCOPY N/A 4/4/2017    Procedure: COLONOSCOPY;  Surgeon: Walker Stern MD;  Location: [facility] (2ND FLR);  Service: Endoscopy;  Laterality: N/A;  PA Systolic Pressure 85.56. HD Patient MWF, K+ lab prior to procedure.     COLONOSCOPY N/A 4/4/2017    Performed by Walker Stern MD at [facility] (2ND FLR)    EGD (ESOPHAGOGASTRODUODENOSCOPY) N/A 6/12/2018    Performed by Man Galicia MD at [facility] (2ND FLR)    ESOPHAGOGASTRODUODENOSCOPY N/A 6/12/2018     Procedure: EGD (ESOPHAGOGASTRODUODENOSCOPY);  Surgeon: Man Galicia MD;  Location: [facility] (2ND FLR);  Service: Endoscopy;  Laterality: N/A;  EGD in 8-12 weeks with Dr. Galicia on 4th floor for follow up erosive esophagitis and Mejia's surveillance.    Patient should be on Pantoprazole 40mg every 12 hours or the equivulant of another PPI    awaiting for patient to reply back regarding changing    ESOPHAGOGASTRODUODENOSCOPY (EGD) N/A 5/22/2018    Performed by Ke Sparks MD at [facility] (2ND FLR)    ESOPHAGOGASTRODUODENOSCOPY (EGD) N/A 3/16/2018    Performed by Kevin De La Paz MD at [facility] (2ND FLR)    ESOPHAGOGASTRODUODENOSCOPY (EGD) N/A 3/8/2018    Performed by Man Galicia MD at [facility] (2ND FLR)    ESOPHAGOGASTRODUODENOSCOPY (EGD) N/A 4/4/2017    Performed by Walker Stern MD at [facility] (2ND FLR)    ESOPHAGOGASTRODUODENOSCOPY (EGD) N/A 10/17/2014    Performed by Man Galicia MD at [facility] (2ND FLR)    FISTULOGRAM Right 9/18/2014    Performed by Grayson [facility] at [facility] CATH LAB    FOOT AMPUTATION THROUGH METATARSAL      left foot    LEG AMPUTATION THROUGH KNEE  12/18/2013    left BKA    R AVF  9/12/12    UPPER GASTROINTESTINAL ENDOSCOPY          Family History   Problem Relation Age of Onset    Early death Mother     Kidney disease Father     Hypertension Father     Heart disease Father     Hyperlipidemia Father     Diabetes Brother     Alcohol abuse Maternal Grandmother     Diabetes Maternal Grandfather     Hypertension Sister     Melanoma Neg Hx         Social History     Socioeconomic History    Marital status:      Spouse name: Not on file    Number of children: Not on file    Years of education: Not on file    Highest education level: Not on file   Social Needs    Financial resource strain: Not on file    Food insecurity - worry: Not on file    Food insecurity - inability: Not on file    Transportation needs - medical: Not on file     Transportation needs - non-medical: Not on file   Occupational History    Not on file   Tobacco Use    Smoking status: Former Smoker     Packs/day: 1.00     Years: 10.00     Pack years: 10.00    Smokeless tobacco: Never Used   Substance and Sexual Activity    Alcohol use: No    Drug use: No    Sexual activity: Yes     Partners: Female     Birth control/protection: None   Other Topics Concern    Not on file   Social History Narrative    Not on file        Social History     Tobacco Use   Smoking Status Former Smoker    Packs/day: 1.00    Years: 10.00    Pack years: 10.00   Smokeless Tobacco Never Used        Allergies as of 10/02/2018 - Reviewed 10/02/2018   Allergen Reaction Noted    Fosrenol [lanthanum] Nausea And Vomiting 03/23/2017          Home Medications:  Prior to Admission medications    Medication Sig Start Date End Date Taking? Authorizing Provider   amLODIPine (NORVASC) 10 MG tablet Take 10 mg by mouth once daily.    Historical Provider, MD   atorvastatin (LIPITOR) 80 MG tablet Take 80 mg by mouth every evening.    Historical Provider, MD   carvedilol (COREG) 25 MG tablet Take 25 mg by mouth 2 (two) times daily with meals.    Historical Provider, MD   chlorproMAZINE (THORAZINE) 25 MG tablet Take 25 mg by mouth every 12 (twelve) hours as needed.    Historical Provider, MD   dicyclomine (BENTYL) 10 MG capsule Take 1 capsule (10 mg total) by mouth before meals as needed (TID PRN). For belching 9/25/18   Opal Mckeon PA-C   famotidine (PEPCID) 40 MG tablet Take 40 mg by mouth daily as needed for Heartburn.    Historical Provider, MD   hydrALAZINE (APRESOLINE) 50 MG tablet Take 50 mg by mouth every 8 (eight) hours.    Historical Provider, MD   ondansetron (ZOFRAN) 8 MG tablet Take 1 tablet (8 mg total) by mouth every 8 (eight) hours as needed for Nausea. 9/12/18   Opal Mckeon PA-C   pantoprazole (PROTONIX) 40 MG tablet Take 1 tablet (40 mg total) by mouth 2 (two) times daily before  "meals. 3/17/18 3/17/19  Rancho Carvalho PA-C       Review of Systems:  Review of Systems   Constitutional: Positive for unexpected weight change (20 lbsin  months ). Negative for chills, diaphoresis, fatigue and fever.   Eyes: Negative for visual disturbance.   Respiratory: Negative for cough, shortness of breath and wheezing.    Cardiovascular: Negative for chest pain and palpitations.   Gastrointestinal: Positive for nausea and vomiting (with brown emesis ). Negative for abdominal pain, blood in stool, constipation and diarrhea.   Genitourinary: Negative.    Skin: Negative for rash.   Neurological: Negative for dizziness, weakness, light-headedness and headaches.   Psychiatric/Behavioral: Negative for sleep disturbance.       Health Maintainence:   Immunizations:  Health Maintenance       Date Due Completion Date    TETANUS VACCINE 06/13/1982 ---    Pneumococcal PPSV23 (Medium Risk) (1) 06/13/1982 ---    Foot Exam 06/08/2018 6/8/2017    Influenza Vaccine 07/01/2018 10/1/2017 (Done)    Override on 10/1/2017: Done    Lipid Panel 09/09/2018 9/9/2017    Eye Exam 09/13/2018 9/13/2017    Override on 9/1/2017: Done    Hemoglobin A1c 02/22/2019 8/22/2018    Colonoscopy 04/04/2027 4/4/2017           PHYSICAL EXAM     BP (!) 142/98 (BP Location: Left arm, Patient Position: Sitting, BP Method: Large (Manual))   Pulse 67   Ht 5' 6" (1.676 m)   Wt 61.2 kg (134 lb 14.7 oz)   SpO2 99%   BMI 21.78 kg/m²  Body mass index is 21.78 kg/m².    Physical Exam   Constitutional: He is oriented to person, place, and time. He appears well-developed and well-nourished.   HENT:   Head: Normocephalic.   Right Ear: External ear normal.   Left Ear: External ear normal.   Nose: Nose normal.   Mouth/Throat: Oropharynx is clear and moist. No oropharyngeal exudate.   Eyes: Pupils are equal, round, and reactive to light.   Neck: No JVD present. No tracheal deviation present. No thyromegaly present.   Cardiovascular: Normal rate, regular " rhythm and intact distal pulses. Exam reveals no gallop and no friction rub.   No murmur heard.  Pulmonary/Chest: Breath sounds normal. No respiratory distress. He has no wheezes. He has no rales. He exhibits no tenderness.   Abdominal: Soft. Bowel sounds are normal. He exhibits no distension. There is no tenderness.   Right upper arm fistula - +T+B    Musculoskeletal: Normal range of motion. He exhibits no edema or tenderness.   Left BKA    Lymphadenopathy:     He has no cervical adenopathy.   Neurological: He is alert and oriented to person, place, and time.   Skin: Skin is warm and dry. No rash noted.   Psychiatric: He has a normal mood and affect. His behavior is normal.   Vitals reviewed.      LABS     Lab Results   Component Value Date    HGBA1C 4.5 08/22/2018     CMP  Sodium   Date Value Ref Range Status   09/12/2018 141 136 - 145 mmol/L Final     Potassium   Date Value Ref Range Status   09/12/2018 4.3 3.5 - 5.1 mmol/L Final     Comment:     *Result may be interfered by visible hemolysis     Chloride   Date Value Ref Range Status   09/12/2018 97 95 - 110 mmol/L Final     CO2   Date Value Ref Range Status   09/12/2018 30 (H) 23 - 29 mmol/L Final     Glucose   Date Value Ref Range Status   09/12/2018 146 (H) 70 - 110 mg/dL Final     BUN, Bld   Date Value Ref Range Status   09/12/2018 23 (H) 6 - 20 mg/dL Final     Creatinine   Date Value Ref Range Status   09/12/2018 5.9 (H) 0.5 - 1.4 mg/dL Final     Calcium   Date Value Ref Range Status   09/12/2018 10.3 8.7 - 10.5 mg/dL Final     Total Protein   Date Value Ref Range Status   09/12/2018 11.0 (H) 6.0 - 8.4 g/dL Final     Comment:     *Result may be interfered by visible hemolysis     Albumin   Date Value Ref Range Status   09/12/2018 4.0 3.5 - 5.2 g/dL Final     Total Bilirubin   Date Value Ref Range Status   09/12/2018 0.5 0.1 - 1.0 mg/dL Final     Comment:     For infants and newborns, interpretation of results should be based  on gestational age, weight and  in agreement with clinical  observations.  Premature Infant recommended reference ranges:  Up to 24 hours.............<8.0 mg/dL  Up to 48 hours............<12.0 mg/dL  3-5 days..................<15.0 mg/dL  6-29 days.................<15.0 mg/dL       Alkaline Phosphatase   Date Value Ref Range Status   09/12/2018 125 55 - 135 U/L Final     AST   Date Value Ref Range Status   09/12/2018 31 10 - 40 U/L Final     Comment:     *Result may be interfered by visible hemolysis     ALT   Date Value Ref Range Status   09/12/2018 16 10 - 44 U/L Final     Anion Gap   Date Value Ref Range Status   09/12/2018 14 8 - 16 mmol/L Final     eGFR if    Date Value Ref Range Status   09/12/2018 11.5 (A) >60 mL/min/1.73 m^2 Final     eGFR if non    Date Value Ref Range Status   09/12/2018 9.9 (A) >60 mL/min/1.73 m^2 Final     Comment:     Calculation used to obtain the estimated glomerular filtration  rate (eGFR) is the CKD-EPI equation.        Lab Results   Component Value Date    WBC 5.22 09/12/2018    HGB 14.5 09/12/2018    HCT 44.9 09/12/2018    MCV 91 09/12/2018     09/12/2018     Lab Results   Component Value Date    CHOL 161 09/09/2017    CHOL 195 07/24/2017    CHOL 128 11/02/2016     Lab Results   Component Value Date    HDL 50 09/09/2017    HDL 48 07/24/2017    HDL 33 (L) 11/02/2016     Lab Results   Component Value Date    LDLCALC 89.0 09/09/2017    LDLCALC 123.8 07/24/2017    LDLCALC 73.2 11/02/2016     Lab Results   Component Value Date    TRIG 110 09/09/2017    TRIG 116 07/24/2017    TRIG 109 11/02/2016     Lab Results   Component Value Date    CHOLHDL 31.1 09/09/2017    CHOLHDL 24.6 07/24/2017    CHOLHDL 25.8 11/02/2016     Lab Results   Component Value Date    TSH 0.086 (L) 04/04/2018       ASSESSMENT/PLAN     Vaughn Retana is a 53 y.o. male with  Past Medical History:   Diagnosis Date    Amputation stump pain 9/10/2013    Aspiration pneumonia 7/27/2015    Asterixis 11/8/2016     C. difficile colitis 8/7/2015    Cholelithiasis without obstruction 8/25/2015    Chronic diastolic heart failure     2-23-17   1 - Low normal to mildly depressed left ventricular systolic function (EF 50-55%).    2 - Right ventricular enlargement with mildly depressed systolic function.    3 - Left ventricular diastolic dysfunction.    4 - Right atrial enlargement.    5 - Severe tricuspid regurgitation.    6 - Pulmonary hypertension. The estimated PA systolic pressure is 86 mmHg.    7 - Increased central venous pressure.    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 C-scope - UTD. Labs ordered. Denies depressive symptoms. Will increase protein and calories in diet - discussed healthy ways to increase calories. F/u with GI for EGD.   -     CBC auto differential; Future; Expected date: 10/02/2018  -     Comprehensive metabolic panel; Future; Expected date: 10/02/2018  -     Lipid panel; Future; Expected date: 10/02/2018  -     C-reactive protein; Future; Expected date: 10/02/2018  -     Sedimentation rate; Future; Expected date:  10/02/2018  -     HIV-1 and HIV-2 antibodies; Future; Expected date: 10/02/2018  -     TSH; Future; Expected date: 10/02/2018  -     T4, free; Future; Expected date: 10/02/2018  -     PSA, Screening; Future; Expected date: 10/02/2018    Renovascular hypertension- poorly controlled off meds. Will restart meds. Check BP at home. RTC in 4 weeks for BP check. Low Na diet. Avoid hot sausage and fried foods and pork rinds and chips.   -     amLODIPine (NORVASC) 10 MG tablet; Take 1 tablet (10 mg total) by mouth once daily.  Dispense: 90 tablet; Refill: 1  -     hydrALAZINE (APRESOLINE) 50 MG tablet; Take 1 tablet (50 mg total) by mouth every 12 (twelve) hours.  Dispense: 180 tablet; Refill: 1  -     carvedilol (COREG) 25 MG tablet; Take 1 tablet (25 mg total) by mouth 2 (two) times daily with meals.  Dispense: 180 tablet; Refill: 1    Non-intractable vomiting with nausea, unspecified vomiting type- f/u with GI and for repeat EGD. May use bentyl, zofran as needed. [place] diet.    -     chlorproMAZINE (THORAZINE) 25 MG tablet; Take 1 tablet (25 mg total) by mouth every 12 (twelve) hours as needed.  Dispense: 180 tablet; Refill: 1  -     dicyclomine (BENTYL) 10 MG capsule; Take 1 capsule (10 mg total) by mouth before meals as needed (TID PRN). For belching  Dispense: 90 capsule; Refill: 0  -     ondansetron (ZOFRAN) 8 MG tablet; Take 1 tablet (8 mg total) by mouth every 8 (eight) hours as needed for Nausea.  Dispense: 40 tablet; Refill: 0    Dyslipidemia- will restart lipitor. Discussed low cholesterol diet. FLP ordered.   -     atorvastatin (LIPITOR) 80 MG tablet; Take 1 tablet (80 mg total) by mouth every evening.  Dispense: 90 tablet; Refill: 1  -     carvedilol (COREG) 25 MG tablet; Take 1 tablet (25 mg total) by mouth 2 (two) times daily with meals.  Dispense: 180 tablet; Refill: 1  -     Lipid panel; Future; Expected date: 10/02/2018    ESRD on hemodialysis- stable. Cont MWF.     Chronic kidney disease-mineral and bone  disorder- stable.     Controlled type 2 diabetes mellitus with chronic kidney disease, without long-term current use of insulin, unspecified CKD stage- A1c at goal. was controlled with diet.     Erosive gastritis- cont pepcid. F/u with GI and repeat EGD   -     famotidine (PEPCID) 40 MG tablet; Take 1 tablet (40 mg total) by mouth 2 (two) times daily.  Dispense: 180 tablet; Refill: 1    History of left below knee amputation 12/18/13    History of Intracerebral Hemorrhage: L BG 5/2013; R BG 9/2016; R BG 11/2016; L caudate head 2/2017  -     carvedilol (COREG) 25 MG tablet; Take 1 tablet (25 mg total) by mouth 2 (two) times daily with meals.  Dispense: 180 tablet; Refill: 1    Screening for malignant neoplasm of prostate  -     PSA, Screening; Future; Expected date: 10/02/2018    Encounter for screening for HIV   -     HIV-1 and HIV-2 antibodies; Future; Expected date: 10/02/2018    Follow up in 4 weeks for BP check, establish with new PCP in 3 months     Yvette HERNANDEZ, APRN, FNP-c   Department of Internal Medicine - [facility]  2:12 PM

## 2018-10-03 ENCOUNTER — LAB VISIT (OUTPATIENT)
Dept: LAB | Facility: HOSPITAL | Age: 53
End: 2018-10-03
Attending: NURSE PRACTITIONER
Payer: MEDICARE

## 2018-10-03 DIAGNOSIS — E78.5 DYSLIPIDEMIA: Chronic | ICD-10-CM

## 2018-10-03 DIAGNOSIS — Z12.5 SCREENING FOR MALIGNANT NEOPLASM OF PROSTATE: ICD-10-CM

## 2018-10-03 DIAGNOSIS — Z11.4 ENCOUNTER FOR SCREENING FOR HIV: ICD-10-CM

## 2018-10-03 DIAGNOSIS — R63.4 WEIGHT LOSS: ICD-10-CM

## 2018-10-03 LAB
ALBUMIN SERPL BCP-MCNC: 3.3 G/DL
ALP SERPL-CCNC: 91 U/L
ALT SERPL W/O P-5'-P-CCNC: 47 U/L
ANION GAP SERPL CALC-SCNC: 7 MMOL/L
AST SERPL-CCNC: 54 U/L
BASOPHILS # BLD AUTO: 0.01 K/UL
BASOPHILS NFR BLD: 0.2 %
BILIRUB SERPL-MCNC: 0.7 MG/DL
BUN SERPL-MCNC: 7 MG/DL
CALCIUM SERPL-MCNC: 9.6 MG/DL
CHLORIDE SERPL-SCNC: 102 MMOL/L
CHOLEST SERPL-MCNC: 156 MG/DL
CHOLEST/HDLC SERPL: 2.7 {RATIO}
CO2 SERPL-SCNC: 31 MMOL/L
COMPLEXED PSA SERPL-MCNC: 1.7 NG/ML
CREAT SERPL-MCNC: 3.8 MG/DL
CRP SERPL-MCNC: 0.8 MG/L
DIFFERENTIAL METHOD: ABNORMAL
EOSINOPHIL # BLD AUTO: 0.3 K/UL
EOSINOPHIL NFR BLD: 7.3 %
ERYTHROCYTE [DISTWIDTH] IN BLOOD BY AUTOMATED COUNT: 14.4 %
ERYTHROCYTE [SEDIMENTATION RATE] IN BLOOD BY WESTERGREN METHOD: 52 MM/HR
EST. GFR  (AFRICAN AMERICAN): 20 ML/MIN/1.73 M^2
EST. GFR  (NON AFRICAN AMERICAN): 17 ML/MIN/1.73 M^2
GLUCOSE SERPL-MCNC: 91 MG/DL
HCT VFR BLD AUTO: 29.9 %
HDLC SERPL-MCNC: 57 MG/DL
HDLC SERPL: 36.5 %
HGB BLD-MCNC: 10 G/DL
LDLC SERPL CALC-MCNC: 84 MG/DL
LYMPHOCYTES # BLD AUTO: 1.7 K/UL
LYMPHOCYTES NFR BLD: 40.7 %
MCH RBC QN AUTO: 30.6 PG
MCHC RBC AUTO-ENTMCNC: 33.4 G/DL
MCV RBC AUTO: 91 FL
MONOCYTES # BLD AUTO: 0.3 K/UL
MONOCYTES NFR BLD: 8 %
NEUTROPHILS # BLD AUTO: 1.8 K/UL
NEUTROPHILS NFR BLD: 43.6 %
NONHDLC SERPL-MCNC: 99 MG/DL
PLATELET # BLD AUTO: 152 K/UL
PMV BLD AUTO: 10.6 FL
POTASSIUM SERPL-SCNC: 3 MMOL/L
PROT SERPL-MCNC: 7.7 G/DL
RBC # BLD AUTO: 3.27 M/UL
SODIUM SERPL-SCNC: 140 MMOL/L
T4 FREE SERPL-MCNC: 1.31 NG/DL
TRIGL SERPL-MCNC: 75 MG/DL
TSH SERPL DL<=0.005 MIU/L-ACNC: 1.36 UIU/ML
WBC # BLD AUTO: 4.23 K/UL

## 2018-10-03 PROCEDURE — 84439 ASSAY OF FREE THYROXINE: CPT

## 2018-10-03 PROCEDURE — 84153 ASSAY OF PSA TOTAL: CPT

## 2018-10-03 PROCEDURE — 80053 COMPREHEN METABOLIC PANEL: CPT

## 2018-10-03 PROCEDURE — 86140 C-REACTIVE PROTEIN: CPT

## 2018-10-03 PROCEDURE — 84443 ASSAY THYROID STIM HORMONE: CPT

## 2018-10-03 PROCEDURE — 86703 HIV-1/HIV-2 1 RESULT ANTBDY: CPT

## 2018-10-03 PROCEDURE — 85025 COMPLETE CBC W/AUTO DIFF WBC: CPT

## 2018-10-03 PROCEDURE — 36415 COLL VENOUS BLD VENIPUNCTURE: CPT

## 2018-10-03 PROCEDURE — 80061 LIPID PANEL: CPT

## 2018-10-03 PROCEDURE — 85652 RBC SED RATE AUTOMATED: CPT

## 2018-10-04 LAB — HIV 1+2 AB+HIV1 P24 AG SERPL QL IA: NEGATIVE

## 2018-10-16 ENCOUNTER — PATIENT MESSAGE (OUTPATIENT)
Dept: INTERNAL MEDICINE | Facility: CLINIC | Age: 53
End: 2018-10-16

## 2018-10-16 NOTE — TELEPHONE ENCOUNTER
PSA normal.   Thyroid normal. HIV negative   cholesterol at goal.   Potassium is low - repeat at dialysis   CBC with anemia - f/u with Gastro for EGD - has apt next week

## 2018-10-18 ENCOUNTER — TELEPHONE (OUTPATIENT)
Dept: INTERNAL MEDICINE | Facility: CLINIC | Age: 53
End: 2018-10-18

## 2018-10-18 NOTE — TELEPHONE ENCOUNTER
----- Message from Jarrett Sierra sent at 10/18/2018 10:45 AM CDT -----  Contact: [phone]  Patient is returning a call from the office . Please call and advise, Thanks

## 2018-12-03 ENCOUNTER — HOSPITAL ENCOUNTER (INPATIENT)
Facility: HOSPITAL | Age: 53
LOS: 7 days | Discharge: NURSING, REHAB OR LONG-TERM CARE | DRG: 377 | End: 2018-12-11
Attending: EMERGENCY MEDICINE | Admitting: HOSPITALIST
Payer: MEDICARE

## 2018-12-03 DIAGNOSIS — N18.6 CONTROLLED TYPE 2 DIABETES MELLITUS WITH CHRONIC KIDNEY DISEASE ON CHRONIC DIALYSIS, WITHOUT LONG-TERM CURRENT USE OF INSULIN: Chronic | ICD-10-CM

## 2018-12-03 DIAGNOSIS — E11.22 CONTROLLED TYPE 2 DIABETES MELLITUS WITH CHRONIC KIDNEY DISEASE ON CHRONIC DIALYSIS, WITHOUT LONG-TERM CURRENT USE OF INSULIN: Chronic | ICD-10-CM

## 2018-12-03 DIAGNOSIS — K92.2 GI BLEED: ICD-10-CM

## 2018-12-03 DIAGNOSIS — Z99.2 ESRD ON HEMODIALYSIS: Chronic | ICD-10-CM

## 2018-12-03 DIAGNOSIS — N18.6 ESRD ON HEMODIALYSIS: Chronic | ICD-10-CM

## 2018-12-03 DIAGNOSIS — I16.0 HYPERTENSIVE URGENCY: ICD-10-CM

## 2018-12-03 DIAGNOSIS — R04.2 HEMOPTYSIS: Primary | ICD-10-CM

## 2018-12-03 DIAGNOSIS — Z99.2 CONTROLLED TYPE 2 DIABETES MELLITUS WITH CHRONIC KIDNEY DISEASE ON CHRONIC DIALYSIS, WITHOUT LONG-TERM CURRENT USE OF INSULIN: Chronic | ICD-10-CM

## 2018-12-03 DIAGNOSIS — Z91.199 NONCOMPLIANCE: Chronic | ICD-10-CM

## 2018-12-03 LAB
BUN SERPL-MCNC: 49 MG/DL (ref 6–30)
CHLORIDE SERPL-SCNC: 100 MMOL/L (ref 95–110)
CREAT SERPL-MCNC: 6.3 MG/DL (ref 0.5–1.4)
GLUCOSE SERPL-MCNC: 142 MG/DL (ref 70–110)
HCT VFR BLD CALC: 51 %PCV (ref 36–54)
POC IONIZED CALCIUM: 0.81 MMOL/L (ref 1.06–1.42)
POC TCO2 (MEASURED): 35 MMOL/L (ref 23–29)
POTASSIUM BLD-SCNC: 8 MMOL/L (ref 3.5–5.1)
SAMPLE: ABNORMAL
SODIUM BLD-SCNC: 133 MMOL/L (ref 136–145)

## 2018-12-03 PROCEDURE — 63600175 PHARM REV CODE 636 W HCPCS: Performed by: PHYSICIAN ASSISTANT

## 2018-12-03 PROCEDURE — C9113 INJ PANTOPRAZOLE SODIUM, VIA: HCPCS | Performed by: PHYSICIAN ASSISTANT

## 2018-12-03 PROCEDURE — 93005 ELECTROCARDIOGRAM TRACING: CPT

## 2018-12-03 PROCEDURE — 93010 ELECTROCARDIOGRAM REPORT: CPT | Mod: ,,, | Performed by: INTERNAL MEDICINE

## 2018-12-03 PROCEDURE — 99285 EMERGENCY DEPT VISIT HI MDM: CPT | Mod: 25

## 2018-12-03 PROCEDURE — 82962 GLUCOSE BLOOD TEST: CPT

## 2018-12-03 PROCEDURE — 86850 RBC ANTIBODY SCREEN: CPT

## 2018-12-03 PROCEDURE — 96374 THER/PROPH/DIAG INJ IV PUSH: CPT

## 2018-12-03 PROCEDURE — 86920 COMPATIBILITY TEST SPIN: CPT

## 2018-12-03 PROCEDURE — 99283 EMERGENCY DEPT VISIT LOW MDM: CPT | Mod: ,,, | Performed by: PHYSICIAN ASSISTANT

## 2018-12-03 RX ORDER — PANTOPRAZOLE SODIUM 40 MG/10ML
80 INJECTION, POWDER, LYOPHILIZED, FOR SOLUTION INTRAVENOUS
Status: COMPLETED | OUTPATIENT
Start: 2018-12-03 | End: 2018-12-03

## 2018-12-03 RX ADMIN — PANTOPRAZOLE SODIUM 80 MG: 40 INJECTION, POWDER, FOR SOLUTION INTRAVENOUS at 11:12

## 2018-12-04 PROBLEM — E11.29 CONTROLLED TYPE 2 DIABETES MELLITUS WITH KIDNEY COMPLICATION, WITHOUT LONG-TERM CURRENT USE OF INSULIN: Chronic | Status: ACTIVE | Noted: 2018-05-21

## 2018-12-04 PROBLEM — K92.2 GI BLEED: Status: ACTIVE | Noted: 2018-12-04

## 2018-12-04 PROBLEM — I16.0 HYPERTENSIVE URGENCY: Status: ACTIVE | Noted: 2018-12-04

## 2018-12-04 PROBLEM — Z91.148 NONCOMPLIANCE WITH MEDICATION REGIMEN: Chronic | Status: ACTIVE | Noted: 2018-12-04

## 2018-12-04 PROBLEM — E43 SEVERE MALNUTRITION: Status: ACTIVE | Noted: 2018-12-04

## 2018-12-04 PROBLEM — Z91.148 NONCOMPLIANCE WITH MEDICATION REGIMEN: Chronic | Status: RESOLVED | Noted: 2018-12-04 | Resolved: 2018-12-04

## 2018-12-04 PROBLEM — Z91.199 NONCOMPLIANCE: Chronic | Status: ACTIVE | Noted: 2018-12-04

## 2018-12-04 LAB
ABO + RH BLD: NORMAL
ACANTHOCYTES BLD QL SMEAR: ABNORMAL
ALBUMIN SERPL BCP-MCNC: 3.6 G/DL
ALBUMIN SERPL BCP-MCNC: 3.7 G/DL
ALP SERPL-CCNC: 105 U/L
ALP SERPL-CCNC: 94 U/L
ALT SERPL W/O P-5'-P-CCNC: 16 U/L
ALT SERPL W/O P-5'-P-CCNC: 19 U/L
ANION GAP SERPL CALC-SCNC: 13 MMOL/L
ANION GAP SERPL CALC-SCNC: 17 MMOL/L
ANISOCYTOSIS BLD QL SMEAR: ABNORMAL
APTT BLDCRRT: 22.5 SEC
AST SERPL-CCNC: 23 U/L
AST SERPL-CCNC: 31 U/L
AUER BODIES BLD QL SMEAR: ABNORMAL
BASO STIPL BLD QL SMEAR: ABNORMAL
BASOPHILS # BLD AUTO: 0.03 K/UL
BASOPHILS # BLD AUTO: 0.04 K/UL
BASOPHILS # BLD AUTO: ABNORMAL K/UL
BASOPHILS NFR BLD: 0.3 %
BASOPHILS NFR BLD: 0.4 %
BASOPHILS NFR BLD: ABNORMAL %
BILIRUB SERPL-MCNC: 0.5 MG/DL
BILIRUB SERPL-MCNC: 0.5 MG/DL
BLASTS NFR BLD MANUAL: ABNORMAL %
BLD GP AB SCN CELLS X3 SERPL QL: NORMAL
BUN SERPL-MCNC: 34 MG/DL
BUN SERPL-MCNC: 35 MG/DL (ref 6–30)
BUN SERPL-MCNC: 38 MG/DL
BURR CELLS BLD QL SMEAR: ABNORMAL
CABOT RINGS BLD QL SMEAR: ABNORMAL
CALCIUM SERPL-MCNC: 9.8 MG/DL
CALCIUM SERPL-MCNC: 9.9 MG/DL
CHLORIDE SERPL-SCNC: 94 MMOL/L
CHLORIDE SERPL-SCNC: 94 MMOL/L
CHLORIDE SERPL-SCNC: 96 MMOL/L (ref 95–110)
CO2 SERPL-SCNC: 31 MMOL/L
CO2 SERPL-SCNC: 33 MMOL/L
CREAT SERPL-MCNC: 6.8 MG/DL
CREAT SERPL-MCNC: 7 MG/DL (ref 0.5–1.4)
CREAT SERPL-MCNC: 7.1 MG/DL
DACRYOCYTES BLD QL SMEAR: ABNORMAL
DIFFERENTIAL METHOD: ABNORMAL
DOHLE BOD BLD QL SMEAR: ABNORMAL
EOSINOPHIL # BLD AUTO: 0 K/UL
EOSINOPHIL # BLD AUTO: 0 K/UL
EOSINOPHIL # BLD AUTO: ABNORMAL K/UL
EOSINOPHIL NFR BLD: 0.3 %
EOSINOPHIL NFR BLD: 0.4 %
EOSINOPHIL NFR BLD: ABNORMAL %
ERYTHROCYTE [DISTWIDTH] IN BLOOD BY AUTOMATED COUNT: 13.3 %
ERYTHROCYTE [DISTWIDTH] IN BLOOD BY AUTOMATED COUNT: 13.5 %
ERYTHROCYTE [DISTWIDTH] IN BLOOD BY AUTOMATED COUNT: ABNORMAL %
EST. GFR  (AFRICAN AMERICAN): 9.2 ML/MIN/1.73 M^2
EST. GFR  (AFRICAN AMERICAN): 9.7 ML/MIN/1.73 M^2
EST. GFR  (NON AFRICAN AMERICAN): 7.9 ML/MIN/1.73 M^2
EST. GFR  (NON AFRICAN AMERICAN): 8.4 ML/MIN/1.73 M^2
ESTIMATED AVG GLUCOSE: ABNORMAL MG/DL
GIANT PLATELETS BLD QL SMEAR: ABNORMAL
GLUCOSE SERPL-MCNC: 125 MG/DL
GLUCOSE SERPL-MCNC: 129 MG/DL (ref 70–110)
GLUCOSE SERPL-MCNC: 132 MG/DL
HBA1C MFR BLD HPLC: <4 %
HCT VFR BLD AUTO: 34.7 %
HCT VFR BLD AUTO: 40.2 %
HCT VFR BLD AUTO: 40.4 %
HCT VFR BLD AUTO: 44.3 %
HCT VFR BLD AUTO: 44.7 %
HCT VFR BLD AUTO: ABNORMAL %
HCT VFR BLD CALC: 46 %PCV (ref 36–54)
HEINZ BOD BLD QL SMEAR: ABNORMAL
HGB BLD-MCNC: 11.3 G/DL
HGB BLD-MCNC: 13.4 G/DL
HGB BLD-MCNC: 14 G/DL
HGB BLD-MCNC: 14.8 G/DL
HGB BLD-MCNC: 14.8 G/DL
HGB BLD-MCNC: ABNORMAL G/DL
HGB C CRY RBC QL MICRO: ABNORMAL
HOWELL-JOLLY BOD BLD QL SMEAR: ABNORMAL
HYPOCHROMIA BLD QL SMEAR: ABNORMAL
IMM GRANULOCYTES # BLD AUTO: 0.03 K/UL
IMM GRANULOCYTES # BLD AUTO: 0.04 K/UL
IMM GRANULOCYTES # BLD AUTO: ABNORMAL K/UL
IMM GRANULOCYTES NFR BLD AUTO: 0.3 %
IMM GRANULOCYTES NFR BLD AUTO: 0.4 %
IMM GRANULOCYTES NFR BLD AUTO: ABNORMAL %
INR PPP: 1
LYMPHOCYTES # BLD AUTO: 1 K/UL
LYMPHOCYTES # BLD AUTO: 1.1 K/UL
LYMPHOCYTES # BLD AUTO: ABNORMAL K/UL
LYMPHOCYTES NFR BLD: 11.2 %
LYMPHOCYTES NFR BLD: 11.9 %
LYMPHOCYTES NFR BLD: ABNORMAL %
MAGNESIUM SERPL-MCNC: 2.4 MG/DL
MCH RBC QN AUTO: 31 PG
MCH RBC QN AUTO: 32 PG
MCH RBC QN AUTO: ABNORMAL PG
MCHC RBC AUTO-ENTMCNC: 33.1 G/DL
MCHC RBC AUTO-ENTMCNC: 33.4 G/DL
MCHC RBC AUTO-ENTMCNC: ABNORMAL G/DL
MCV RBC AUTO: 93 FL
MCV RBC AUTO: 97 FL
MCV RBC AUTO: ABNORMAL FL
MEGAKARYOCYTIC FRAGMENTS: ABNORMAL
METAMYELOCYTES NFR BLD MANUAL: ABNORMAL %
MONOCYTES # BLD AUTO: 0.8 K/UL
MONOCYTES # BLD AUTO: 0.8 K/UL
MONOCYTES # BLD AUTO: ABNORMAL K/UL
MONOCYTES NFR BLD: 8.2 %
MONOCYTES NFR BLD: 8.9 %
MONOCYTES NFR BLD: ABNORMAL %
MYELOCYTES NFR BLD MANUAL: ABNORMAL %
NEUTROPHILS # BLD AUTO: 7.2 K/UL
NEUTROPHILS # BLD AUTO: 7.4 K/UL
NEUTROPHILS # BLD AUTO: ABNORMAL K/UL
NEUTROPHILS NFR BLD: 78.2 %
NEUTROPHILS NFR BLD: 79.5 %
NEUTROPHILS NFR BLD: ABNORMAL %
NEUTS BAND NFR BLD MANUAL: ABNORMAL %
NRBC BLD-RTO: 0 /100 WBC
NRBC BLD-RTO: 0 /100 WBC
NRBC BLD-RTO: ABNORMAL /100 WBC
OVALOCYTES BLD QL SMEAR: ABNORMAL
PAPPENHEIMER BOD BLD QL SMEAR: ABNORMAL
PHOSPHATE SERPL-MCNC: 3.7 MG/DL
PLASMODIUM BLD QL SMEAR: ABNORMAL
PLATELET # BLD AUTO: 141 K/UL
PLATELET # BLD AUTO: 151 K/UL
PLATELET # BLD AUTO: ABNORMAL K/UL
PLATELET BLD QL SMEAR: ABNORMAL
PMV BLD AUTO: 12.5 FL
PMV BLD AUTO: 12.5 FL
PMV BLD AUTO: ABNORMAL FL
POC IONIZED CALCIUM: 0.94 MMOL/L (ref 1.06–1.42)
POC TCO2 (MEASURED): 32 MMOL/L (ref 23–29)
POCT GLUCOSE: 103 MG/DL (ref 70–110)
POCT GLUCOSE: 105 MG/DL (ref 70–110)
POCT GLUCOSE: 118 MG/DL (ref 70–110)
POCT GLUCOSE: 98 MG/DL (ref 70–110)
POIKILOCYTOSIS BLD QL SMEAR: ABNORMAL
POLYCHROMASIA BLD QL SMEAR: ABNORMAL
POTASSIUM BLD-SCNC: 3.4 MMOL/L (ref 3.5–5.1)
POTASSIUM SERPL-SCNC: 3.6 MMOL/L
POTASSIUM SERPL-SCNC: 4.1 MMOL/L
PROMYELOCYTES NFR BLD MANUAL: ABNORMAL %
PROT SERPL-MCNC: 9.1 G/DL
PROT SERPL-MCNC: 9.3 G/DL
PROTHROMBIN TIME: 10.7 SEC
RBC # BLD AUTO: 4.63 M/UL
RBC # BLD AUTO: 4.78 M/UL
RBC # BLD AUTO: ABNORMAL M/UL
RBC AGGLUT BLD QL: ABNORMAL
ROULEAUX BLD QL SMEAR: ABNORMAL
SAMPLE: ABNORMAL
SCHISTOCYTES BLD QL SMEAR: ABNORMAL
SCHISTOCYTES BLD QL SMEAR: ABNORMAL
SICKLE CELLS BLD QL SMEAR: ABNORMAL
SMUDGE CELLS BLD QL SMEAR: ABNORMAL
SODIUM BLD-SCNC: 141 MMOL/L (ref 136–145)
SODIUM SERPL-SCNC: 140 MMOL/L
SODIUM SERPL-SCNC: 142 MMOL/L
SPHEROCYTES BLD QL SMEAR: ABNORMAL
STOMATOCYTES BLD QL SMEAR: ABNORMAL
TARGETS BLD QL SMEAR: ABNORMAL
TOXIC GRANULES BLD QL SMEAR: ABNORMAL
WBC # BLD AUTO: 9.1 K/UL
WBC # BLD AUTO: 9.48 K/UL
WBC # BLD AUTO: ABNORMAL K/UL
WBC NRBC COR # BLD: ABNORMAL K/UL
WBC OTHER NFR BLD MANUAL: ABNORMAL %
WBC TOXIC VACUOLES BLD QL SMEAR: ABNORMAL

## 2018-12-04 PROCEDURE — 25000003 PHARM REV CODE 250: Performed by: PHYSICIAN ASSISTANT

## 2018-12-04 PROCEDURE — 85730 THROMBOPLASTIN TIME PARTIAL: CPT

## 2018-12-04 PROCEDURE — 63600175 PHARM REV CODE 636 W HCPCS: Performed by: PHYSICIAN ASSISTANT

## 2018-12-04 PROCEDURE — 99220 PR INITIAL OBSERVATION CARE,LEVL III: CPT | Mod: ,,, | Performed by: PHYSICIAN ASSISTANT

## 2018-12-04 PROCEDURE — 36415 COLL VENOUS BLD VENIPUNCTURE: CPT

## 2018-12-04 PROCEDURE — 80053 COMPREHEN METABOLIC PANEL: CPT | Mod: 91

## 2018-12-04 PROCEDURE — 96376 TX/PRO/DX INJ SAME DRUG ADON: CPT

## 2018-12-04 PROCEDURE — 80053 COMPREHEN METABOLIC PANEL: CPT

## 2018-12-04 PROCEDURE — C9113 INJ PANTOPRAZOLE SODIUM, VIA: HCPCS | Performed by: PHYSICIAN ASSISTANT

## 2018-12-04 PROCEDURE — 96375 TX/PRO/DX INJ NEW DRUG ADDON: CPT

## 2018-12-04 PROCEDURE — 85018 HEMOGLOBIN: CPT | Mod: 91

## 2018-12-04 PROCEDURE — 85025 COMPLETE CBC W/AUTO DIFF WBC: CPT

## 2018-12-04 PROCEDURE — 83735 ASSAY OF MAGNESIUM: CPT

## 2018-12-04 PROCEDURE — 11000001 HC ACUTE MED/SURG PRIVATE ROOM

## 2018-12-04 PROCEDURE — 85014 HEMATOCRIT: CPT | Mod: 91

## 2018-12-04 PROCEDURE — 85610 PROTHROMBIN TIME: CPT

## 2018-12-04 PROCEDURE — 83036 HEMOGLOBIN GLYCOSYLATED A1C: CPT

## 2018-12-04 PROCEDURE — 82962 GLUCOSE BLOOD TEST: CPT

## 2018-12-04 PROCEDURE — 84100 ASSAY OF PHOSPHORUS: CPT

## 2018-12-04 RX ORDER — CARVEDILOL 12.5 MG/1
25 TABLET ORAL 2 TIMES DAILY
Status: COMPLETED | OUTPATIENT
Start: 2018-12-04 | End: 2018-12-04

## 2018-12-04 RX ORDER — HYDRALAZINE HYDROCHLORIDE 50 MG/1
50 TABLET, FILM COATED ORAL EVERY 12 HOURS
Status: DISCONTINUED | OUTPATIENT
Start: 2018-12-04 | End: 2018-12-11 | Stop reason: HOSPADM

## 2018-12-04 RX ORDER — ACETAMINOPHEN 325 MG/1
650 TABLET ORAL EVERY 4 HOURS PRN
Status: DISCONTINUED | OUTPATIENT
Start: 2018-12-04 | End: 2018-12-11 | Stop reason: HOSPADM

## 2018-12-04 RX ORDER — PROMETHAZINE HYDROCHLORIDE 25 MG/1
25 TABLET ORAL EVERY 6 HOURS PRN
Status: DISCONTINUED | OUTPATIENT
Start: 2018-12-04 | End: 2018-12-11 | Stop reason: HOSPADM

## 2018-12-04 RX ORDER — ONDANSETRON 8 MG/1
8 TABLET, ORALLY DISINTEGRATING ORAL EVERY 8 HOURS PRN
Status: DISCONTINUED | OUTPATIENT
Start: 2018-12-04 | End: 2018-12-11 | Stop reason: HOSPADM

## 2018-12-04 RX ORDER — AMLODIPINE BESYLATE 10 MG/1
10 TABLET ORAL DAILY
Status: DISCONTINUED | OUTPATIENT
Start: 2018-12-04 | End: 2018-12-11 | Stop reason: HOSPADM

## 2018-12-04 RX ORDER — HYDRALAZINE HYDROCHLORIDE 20 MG/ML
10 INJECTION INTRAMUSCULAR; INTRAVENOUS ONCE AS NEEDED
Status: COMPLETED | OUTPATIENT
Start: 2018-12-04 | End: 2018-12-04

## 2018-12-04 RX ORDER — PANTOPRAZOLE SODIUM 40 MG/10ML
80 INJECTION, POWDER, LYOPHILIZED, FOR SOLUTION INTRAVENOUS EVERY 12 HOURS
Status: DISCONTINUED | OUTPATIENT
Start: 2018-12-04 | End: 2018-12-06

## 2018-12-04 RX ORDER — RAMELTEON 8 MG/1
8 TABLET ORAL NIGHTLY PRN
Status: DISCONTINUED | OUTPATIENT
Start: 2018-12-04 | End: 2018-12-11 | Stop reason: HOSPADM

## 2018-12-04 RX ORDER — SODIUM CHLORIDE 0.9 % (FLUSH) 0.9 %
5 SYRINGE (ML) INJECTION
Status: DISCONTINUED | OUTPATIENT
Start: 2018-12-04 | End: 2018-12-11 | Stop reason: HOSPADM

## 2018-12-04 RX ORDER — HYDRALAZINE HYDROCHLORIDE 20 MG/ML
10 INJECTION INTRAMUSCULAR; INTRAVENOUS ONCE
Status: COMPLETED | OUTPATIENT
Start: 2018-12-04 | End: 2018-12-04

## 2018-12-04 RX ORDER — DICYCLOMINE HYDROCHLORIDE 10 MG/1
10 CAPSULE ORAL
Status: DISCONTINUED | OUTPATIENT
Start: 2018-12-04 | End: 2018-12-11 | Stop reason: HOSPADM

## 2018-12-04 RX ORDER — IPRATROPIUM BROMIDE AND ALBUTEROL SULFATE 2.5; .5 MG/3ML; MG/3ML
3 SOLUTION RESPIRATORY (INHALATION) EVERY 4 HOURS PRN
Status: DISCONTINUED | OUTPATIENT
Start: 2018-12-04 | End: 2018-12-11 | Stop reason: HOSPADM

## 2018-12-04 RX ORDER — AMOXICILLIN 250 MG
1 CAPSULE ORAL 2 TIMES DAILY
Status: DISCONTINUED | OUTPATIENT
Start: 2018-12-04 | End: 2018-12-11 | Stop reason: HOSPADM

## 2018-12-04 RX ORDER — GLUCAGON 1 MG
1 KIT INJECTION
Status: DISCONTINUED | OUTPATIENT
Start: 2018-12-04 | End: 2018-12-11 | Stop reason: HOSPADM

## 2018-12-04 RX ORDER — SEVELAMER CARBONATE 800 MG/1
1600 TABLET, FILM COATED ORAL
Status: DISCONTINUED | OUTPATIENT
Start: 2018-12-04 | End: 2018-12-11 | Stop reason: HOSPADM

## 2018-12-04 RX ORDER — INSULIN ASPART 100 [IU]/ML
0-5 INJECTION, SOLUTION INTRAVENOUS; SUBCUTANEOUS EVERY 6 HOURS PRN
Status: DISCONTINUED | OUTPATIENT
Start: 2018-12-04 | End: 2018-12-11 | Stop reason: HOSPADM

## 2018-12-04 RX ORDER — CARVEDILOL 25 MG/1
25 TABLET ORAL 2 TIMES DAILY WITH MEALS
Status: DISCONTINUED | OUTPATIENT
Start: 2018-12-04 | End: 2018-12-11 | Stop reason: HOSPADM

## 2018-12-04 RX ORDER — FAMOTIDINE 20 MG/1
40 TABLET, FILM COATED ORAL 2 TIMES DAILY
Status: DISCONTINUED | OUTPATIENT
Start: 2018-12-04 | End: 2018-12-06

## 2018-12-04 RX ORDER — SODIUM CHLORIDE 9 MG/ML
INJECTION, SOLUTION INTRAVENOUS ONCE
Status: COMPLETED | OUTPATIENT
Start: 2018-12-05 | End: 2018-12-05

## 2018-12-04 RX ORDER — ATORVASTATIN CALCIUM 20 MG/1
80 TABLET, FILM COATED ORAL NIGHTLY
Status: DISCONTINUED | OUTPATIENT
Start: 2018-12-04 | End: 2018-12-11 | Stop reason: HOSPADM

## 2018-12-04 RX ORDER — HYDRALAZINE HYDROCHLORIDE 50 MG/1
50 TABLET, FILM COATED ORAL EVERY 6 HOURS PRN
Status: DISCONTINUED | OUTPATIENT
Start: 2018-12-04 | End: 2018-12-11 | Stop reason: HOSPADM

## 2018-12-04 RX ORDER — CHLORPROMAZINE HYDROCHLORIDE 25 MG/1
25 TABLET, FILM COATED ORAL EVERY 12 HOURS PRN
Status: DISCONTINUED | OUTPATIENT
Start: 2018-12-04 | End: 2018-12-11 | Stop reason: HOSPADM

## 2018-12-04 RX ORDER — SODIUM CHLORIDE 9 MG/ML
INJECTION, SOLUTION INTRAVENOUS
Status: DISCONTINUED | OUTPATIENT
Start: 2018-12-05 | End: 2018-12-09

## 2018-12-04 RX ADMIN — PANTOPRAZOLE SODIUM 80 MG: 40 INJECTION, POWDER, FOR SOLUTION INTRAVENOUS at 08:12

## 2018-12-04 RX ADMIN — HYDRALAZINE HYDROCHLORIDE 10 MG: 20 INJECTION INTRAMUSCULAR; INTRAVENOUS at 02:12

## 2018-12-04 RX ADMIN — STANDARDIZED SENNA CONCENTRATE AND DOCUSATE SODIUM 1 TABLET: 8.6; 5 TABLET, FILM COATED ORAL at 11:12

## 2018-12-04 RX ADMIN — FAMOTIDINE 40 MG: 20 TABLET ORAL at 08:12

## 2018-12-04 RX ADMIN — SEVELAMER CARBONATE 1600 MG: 800 TABLET, FILM COATED ORAL at 11:12

## 2018-12-04 RX ADMIN — HYDRALAZINE HYDROCHLORIDE 50 MG: 50 TABLET ORAL at 08:12

## 2018-12-04 RX ADMIN — AMLODIPINE BESYLATE 10 MG: 10 TABLET ORAL at 08:12

## 2018-12-04 RX ADMIN — STANDARDIZED SENNA CONCENTRATE AND DOCUSATE SODIUM 1 TABLET: 8.6; 5 TABLET, FILM COATED ORAL at 08:12

## 2018-12-04 RX ADMIN — HYDRALAZINE HYDROCHLORIDE 10 MG: 20 INJECTION INTRAMUSCULAR; INTRAVENOUS at 03:12

## 2018-12-04 RX ADMIN — CARVEDILOL 25 MG: 12.5 TABLET, FILM COATED ORAL at 04:12

## 2018-12-04 RX ADMIN — ATORVASTATIN CALCIUM 80 MG: 20 TABLET, FILM COATED ORAL at 08:12

## 2018-12-04 NOTE — SUBJECTIVE & OBJECTIVE
Past Medical History:   Diagnosis Date    Amputation stump pain 9/10/2013    Aspiration pneumonia 7/27/2015    Asterixis 11/8/2016    C. difficile colitis 8/7/2015    Cholelithiasis without obstruction 8/25/2015    Chronic diastolic heart failure     2-23-17   1 - Low normal to mildly depressed left ventricular systolic function (EF 50-55%).    2 - Right ventricular enlargement with mildly depressed systolic function.    3 - Left ventricular diastolic dysfunction.    4 - Right atrial enlargement.    5 - Severe tricuspid regurgitation.    6 - Pulmonary hypertension. The estimated PA systolic pressure is 86 mmHg.    7 - Increased central venous pressure.    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    Metabolic acidosis, IAG, reduced excretion of inorganic acids     Myoclonic jerking 9/20/2016    Noncompliance with medication regimen 12/4/2018    Secondary hyperparathyroidism (of renal origin)     Secondary pulmonary hypertension 3/23/2017    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    TB lung, latent 08/25/2015    Negative Quantiferon Gold 3-23-17       Past Surgical History:   Procedure Laterality Date    AMPUTATION, BELOW KNEE Left 12/18/2013    Performed by Elgin Houston MD at [facility] OR 1ST FLR    COLONOSCOPY      COLONOSCOPY N/A 4/4/2017    Procedure: COLONOSCOPY;  Surgeon: Walker Stern MD;  Location: [facility] (2ND FLR);  Service: Endoscopy;  Laterality:  N/A;  PA Systolic Pressure 85.56. HD Patient MWF, K+ lab prior to procedure.     COLONOSCOPY N/A 4/4/2017    Performed by Walker Stern MD at [facility] (2ND FLR)    EGD (ESOPHAGOGASTRODUODENOSCOPY) N/A 6/12/2018    Performed by Man Galicia MD at [facility] (2ND FLR)    ESOPHAGOGASTRODUODENOSCOPY N/A 6/12/2018    Procedure: EGD (ESOPHAGOGASTRODUODENOSCOPY);  Surgeon: Man Galicia MD;  Location: [facility] (2ND FLR);  Service: Endoscopy;  Laterality: N/A;  EGD in 8-12 weeks with Dr. Galicia on 4th floor for follow up erosive esophagitis and Mejia's surveillance.    Patient should be on Pantoprazole 40mg every 12 hours or the equivulant of another PPI    awaiting for patient to reply back regarding changing    ESOPHAGOGASTRODUODENOSCOPY (EGD) N/A 5/22/2018    Performed by Ke Sparks MD at [facility] (2ND FLR)    ESOPHAGOGASTRODUODENOSCOPY (EGD) N/A 3/16/2018    Performed by Kevin De La Paz MD at [facility] (2ND FLR)    ESOPHAGOGASTRODUODENOSCOPY (EGD) N/A 3/8/2018    Performed by Man Galicia MD at [facility] (2ND FLR)    ESOPHAGOGASTRODUODENOSCOPY (EGD) N/A 4/4/2017    Performed by Walker Stern MD at [facility] (2ND FLR)    ESOPHAGOGASTRODUODENOSCOPY (EGD) N/A 10/17/2014    Performed by Man Galicia MD at [facility] (2ND FLR)    FISTULOGRAM Right 9/18/2014    Performed by Grayson [facility] at [facility] CATH LAB    FOOT AMPUTATION THROUGH METATARSAL      left foot    LEG AMPUTATION THROUGH KNEE  12/18/2013    left BKA    R AVF  9/12/12    UPPER GASTROINTESTINAL ENDOSCOPY         Review of patient's allergies indicates:   Allergen Reactions    Fosrenol [lanthanum] Nausea And Vomiting     Nausea and vomiting       No current facility-administered medications on file prior to encounter.      Current Outpatient Medications on File Prior to Encounter   Medication Sig    amLODIPine (NORVASC) 10 MG tablet Take 1 tablet (10 mg total) by mouth once daily.    atorvastatin (LIPITOR) 80 MG tablet Take 1  tablet (80 mg total) by mouth every evening.    carvedilol (COREG) 25 MG tablet Take 1 tablet (25 mg total) by mouth 2 (two) times daily with meals.    chlorproMAZINE (THORAZINE) 25 MG tablet Take 1 tablet (25 mg total) by mouth every 12 (twelve) hours as needed.    dicyclomine (BENTYL) 10 MG capsule Take 1 capsule (10 mg total) by mouth before meals as needed (TID PRN). For belching    famotidine (PEPCID) 40 MG tablet Take 1 tablet (40 mg total) by mouth 2 (two) times daily.    hydrALAZINE (APRESOLINE) 50 MG tablet Take 1 tablet (50 mg total) by mouth every 12 (twelve) hours.    ondansetron (ZOFRAN) 8 MG tablet Take 1 tablet (8 mg total) by mouth every 8 (eight) hours as needed for Nausea.    pantoprazole (PROTONIX) 40 MG tablet Take 1 tablet (40 mg total) by mouth 2 (two) times daily before meals.    RENAPLEX-D 800 mcg-12.5 mg -2,000 unit Tab Take 1 tablet by mouth once daily.    sevelamer carbonate (RENVELA) 800 mg Tab TAKE 2 TABLETS BY MOUTH THREE TIMES A DAY WITH MEALS     Family History     Problem Relation (Age of Onset)    Alcohol abuse Maternal Grandmother    Diabetes Brother, Maternal Grandfather    Early death Mother    Heart disease Father    Hyperlipidemia Father    Hypertension Father, Sister    Kidney disease Father        Tobacco Use    Smoking status: Former Smoker     Packs/day: 1.00     Years: 10.00     Pack years: 10.00    Smokeless tobacco: Never Used   Substance and Sexual Activity    Alcohol use: No    Drug use: No    Sexual activity: Yes     Partners: Female     Birth control/protection: None     Review of Systems   Constitutional: Negative for activity change, appetite change, chills, diaphoresis, fatigue, fever and unexpected weight change.   HENT: Negative for congestion, rhinorrhea, sore throat, trouble swallowing and voice change.    Eyes: Negative for visual disturbance.   Respiratory: Negative for cough, choking, chest tightness, shortness of breath and wheezing.     Cardiovascular: Negative for chest pain, palpitations and leg swelling.   Gastrointestinal: Positive for vomiting (spitting up brown-red fluid/mucus). Negative for abdominal distention, abdominal pain, anal bleeding, blood in stool, constipation, diarrhea and nausea.   Endocrine: Negative for cold intolerance, heat intolerance, polydipsia and polyuria.   Genitourinary: Negative for dysuria, flank pain, frequency, hematuria and urgency.   Musculoskeletal: Negative for arthralgias, back pain, joint swelling and myalgias.   Skin: Negative for color change and rash.   Neurological: Negative for dizziness, seizures, syncope, facial asymmetry, speech difficulty, weakness, light-headedness, numbness and headaches.   Hematological: Negative for adenopathy. Does not bruise/bleed easily.   Psychiatric/Behavioral: Negative for agitation, confusion, hallucinations and suicidal ideas.     Objective:     Vital Signs (Most Recent):  Temp: 98.5 °F (36.9 °C) (12/04/18 0137)  Pulse: 104 (12/04/18 0206)  Resp: 20 (12/04/18 0137)  BP: (!) 194/111 (12/04/18 0206)  SpO2: 99 % (12/04/18 0206) Vital Signs (24h Range):  Temp:  [98.3 °F (36.8 °C)-98.5 °F (36.9 °C)] 98.5 °F (36.9 °C)  Pulse:  [] 104  Resp:  [20] 20  SpO2:  [98 %-100 %] 99 %  BP: (128-212)/() 194/111     Weight: 61.4 kg (135 lb 5.8 oz)  Body mass index is 21.85 kg/m².    Physical Exam   Constitutional: He is oriented to person, place, and time. He appears well-developed. No distress.   HENT:   Head: Normocephalic and atraumatic.   Eyes: Pupils are equal, round, and reactive to light.   Neck: Neck supple. Carotid bruit is not present. No thyromegaly present.   Cardiovascular: Normal rate and regular rhythm. Exam reveals no gallop.   No murmur heard.  Pulmonary/Chest: Effort normal and breath sounds normal. No respiratory distress. He has no wheezes.   Abdominal: Bowel sounds are normal. He exhibits no distension. There is no splenomegaly or hepatomegaly. There is  no tenderness. There is no guarding.   Musculoskeletal: Normal range of motion. He exhibits no edema.   L leg BKA   Neurological: He is alert and oriented to person, place, and time. No cranial nerve deficit or sensory deficit.   Skin: Skin is warm and dry. No rash noted.   Psychiatric: He has a normal mood and affect. His behavior is normal.         CRANIAL NERVES     CN III, IV, VI   Pupils are equal, round, and reactive to light.       Significant Labs:   CBC:   Recent Labs   Lab 12/03/18  2307 12/03/18  2321 12/04/18  0103   WBC SEE COMMENT  --  9.10   HGB SEE COMMENT  --  14.8   HCT SEE COMMENT 51 44.7   PLT SEE COMMENT  --  141*     CMP:   Recent Labs   Lab 12/04/18  0023      K 4.1   CL 94*   CO2 33*   *   BUN 34*   CREATININE 6.8*   CALCIUM 9.8   PROT 9.1*   ALBUMIN 3.6   BILITOT 0.5   ALKPHOS 94   AST 31   ALT 19   ANIONGAP 13   EGFRNONAA 8.4*     All pertinent labs within the past 24 hours have been reviewed.    Significant Imaging: I have reviewed all pertinent imaging results/findings within the past 24 hours.

## 2018-12-04 NOTE — PLAN OF CARE
12/04/18 1200   Discharge Assessment   Assessment Type Discharge Planning Assessment   Confirmed/corrected address and phone number on facesheet? Yes   Assessment information obtained from? Patient   Expected Length of Stay (days) 2   Communicated expected length of stay with patient/caregiver yes   Prior to hospitilization cognitive status: Alert/Oriented   Prior to hospitalization functional status: Needs Assistance   Current cognitive status: Alert/Oriented   Current Functional Status: Needs Assistance   Lives With sibling(s)  (sister, Alicia Retana ([phone]))   Able to Return to Prior Arrangements yes   Is patient able to care for self after discharge? Unable to determine at this time (comments)   Patient's perception of discharge disposition home or selfcare   Readmission Within The Last 30 Days no previous admission in last 30 days   Patient currently being followed by outpatient case management? No   Patient currently receives any other outside agency services? No   Equipment Currently Used at Home wheelchair;walker, rolling;prosthesis;shower chair   Do you have any problems affording any of your prescribed medications? No   Is the patient taking medications as prescribed? yes   Does the patient have transportation home? Yes   Transportation Available family or friend will provide  (Daisy gooden ([phone]))   Dialysis Name and Scheduled days [facility] (MWF 0500) RUE AVF   Does the patient receive services at the [facility]? No   Discharge Plan A Home with family   Discharge Plan B Home Health   Patient/Family In Agreement With Plan yes     Dx: GI bld  Consult: GI & neph  [facility]  Hosp f/u appt: MARYJO Cota on 12/18/18 at 1430

## 2018-12-04 NOTE — ASSESSMENT & PLAN NOTE
-Chronic kidney disease-mineral and bone disorder  -Secondary hyperparathyroidism of renal origin    -Consult nephrology to keep patient on dialysis schedule for Monday, Wednesday, Friday  -Continue Renvela 1600mg BID WM  - Patient still makes some urine.

## 2018-12-04 NOTE — ASSESSMENT & PLAN NOTE
-Consult GI for bloody emesis  -H/H stable at 14.8/44.7.  Will trend.  -Patient received Protonix 80mg IV in ER.  -Protonix 40 mg IV BID  -EGD in May showed small hiatal hernia, LA Grade D reflux esophagitis, normal stomach, and normal examined duodenum.  -Patient has not had repeat EGD that was recommended in 3 months.  -NPO, aspiration precautions

## 2018-12-04 NOTE — ED NOTES
Adult Physical Assessment:    Appearance: Patient on stretcher. Appears distressed at this time. Patient is clean and well groomed and in a gown. Patient states he has a sore throat and some abdominal discomfort    Cardiac: Heart sounds audible and without abnormalities noted. Patient attached to continuous cardiac monitor, automatic/cycling BP cuff, and continuous pulse ox. Patient Sinus Tach on the monitor. No peripheral edema noted.    Neuro/LOC: Eyes open spontaneously, behavior appropriate to situation, follows commands, facial expression symmetrical, purposeful motor response noted. AAOx4; The patient is awake, alert and aware of environment with an appropriate affect, and speaking appropriately. Patient states he doesn't feel dizzy or have headache at the time     Respiratory: Breath sounds audible, equal and clear bilaterally. Airway is open and patent, respirations are spontaneous, even, and unlabored. Normal effort and rate noted, and without accessory muscle use.    Skin: Intact, warm and dry. Color is consistent with ethnicity. Normal skin turgor and moist mucous membranes. Patient has some scabbed over wounds on right knee and shin     Gastro: Bowel sounds audible and active x4 quadrants. Abdomen is round and tender noted in all quadrants. Patient is currently dry heaving and spitting up blood tinged spit.    Musculoskeletal: Patient moving all extremities spontaneously. Patient has a BKA LLE and is wearing prosthetic boot at the moment     Peripheral vascular: Pulses +2 x4 upper/lower extremities.     Renal: Patient has a fistula RUE. Patient received dialysis. Last dialysis on 12/3/18. Currently bandaged    -Patient identifiers verified and correct for this patient.  -Patient resting with bedrails up x2 for safety, bed locked in low position, and call bell within reach.  -Allergy band, no limb use RUE,  and fall risk band applied to patient for safety

## 2018-12-04 NOTE — ED PROVIDER NOTES
Encounter Date: 12/3/2018       History     Chief Complaint   Patient presents with    Cough     Pt reports productive cough and sore throat X1 day. Pt denies N/V, fever, chest pain, abdominal pain, SOB. Pt spitting up brown/red mucus in triage. Pt denies being on blood thinners.      Vaughn Retana is a 52 yo male who presents to the ED actively spitting up bright red blood during exam. Reportedly in triage he had red brown emesis. He states onset of symptoms began yesterday morning. He reports he has been coughing up blood, however he has not coughed since he arrived. He reports throat pain but denies any abdominal pain, melena, hematochezia, nausea, fever, chills, or chest pain. He has had GI bleeds in the past which were supposed to be medically managed with protonix. Last EGD was in May of this year which showed gastritis and esophagitis without signs of bleeding. They recommended repeat scope in 3 months but has not been completed yet. Patient denies taking any of his home medications.           Review of patient's allergies indicates:   Allergen Reactions    Fosrenol [lanthanum] Nausea And Vomiting     Nausea and vomiting     Past Medical History:   Diagnosis Date    Amputation stump pain 9/10/2013    Aspiration pneumonia 7/27/2015    Asterixis 11/8/2016    C. difficile colitis 8/7/2015    Cholelithiasis without obstruction 8/25/2015    Chronic diastolic heart failure     2-23-17   1 - Low normal to mildly depressed left ventricular systolic function (EF 50-55%).    2 - Right ventricular enlargement with mildly depressed systolic function.    3 - Left ventricular diastolic dysfunction.    4 - Right atrial enlargement.    5 - Severe tricuspid regurgitation.    6 - Pulmonary hypertension. The estimated PA systolic pressure is 86 mmHg.    7 - Increased central venous pressure.    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    Metabolic acidosis, IAG, reduced excretion of inorganic acids     Myoclonic jerking 9/20/2016    Secondary hyperparathyroidism (of renal origin)     Secondary pulmonary hypertension 3/23/2017    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    TB lung, latent 08/25/2015    Negative Quantiferon Gold 3-23-17     Past Surgical History:   Procedure Laterality Date    AMPUTATION, BELOW KNEE Left 12/18/2013    Performed by Elgin Houston [facility] at [facility] OR 1ST FLR    COLONOSCOPY      COLONOSCOPY N/A 4/4/2017    Procedure: COLONOSCOPY;  Surgeon: Walker Stern MD;  Location: [facility] (2ND FLR);  Service: Endoscopy;  Laterality: N/A;  PA Systolic Pressure 85.56. HD Patient MWF, K+ lab prior to procedure.     COLONOSCOPY N/A 4/4/2017    Performed by Walker Stern MD at [facility] (2ND FLR)    EGD (ESOPHAGOGASTRODUODENOSCOPY) N/A 6/12/2018    Performed by Man Galicia MD at [facility] (2ND FLR)    ESOPHAGOGASTRODUODENOSCOPY N/A 6/12/2018    Procedure: EGD (ESOPHAGOGASTRODUODENOSCOPY);  Surgeon: Man Galicia MD;  Location: [facility] (2ND FLR);  Service: Endoscopy;  Laterality: N/A;  EGD in 8-12 weeks with Dr. Galicia on 4th floor for follow up erosive esophagitis and Mejia's surveillance.    Patient should be on Pantoprazole 40mg every 12 hours or the equivulant of another PPI    awaiting for patient to reply back regarding changing    ESOPHAGOGASTRODUODENOSCOPY (EGD) N/A 5/22/2018    Performed by Ke Sparks MD at [facility] (2ND FLR)     ESOPHAGOGASTRODUODENOSCOPY (EGD) N/A 3/16/2018    Performed by Kevin De La Paz MD at [facility] ENDO (2ND FLR)    ESOPHAGOGASTRODUODENOSCOPY (EGD) N/A 3/8/2018    Performed by Man Galicia MD at [facility] ENDO (2ND FLR)    ESOPHAGOGASTRODUODENOSCOPY (EGD) N/A 4/4/2017    Performed by Walker Stern MD at [facility] ENDO (2ND FLR)    ESOPHAGOGASTRODUODENOSCOPY (EGD) N/A 10/17/2014    Performed by Man Galicia MD at [facility] ENDO (2ND FLR)    FISTULOGRAM Right 9/18/2014    Performed by Grayson Hubbard MD at [facility] CATH LAB    FOOT AMPUTATION THROUGH METATARSAL      left foot    LEG AMPUTATION THROUGH KNEE  12/18/2013    left BKA    R AVF  9/12/12    UPPER GASTROINTESTINAL ENDOSCOPY       Family History   Problem Relation Age of Onset    Early death Mother     Kidney disease Father     Hypertension Father     Heart disease Father     Hyperlipidemia Father     Diabetes Brother     Alcohol abuse Maternal Grandmother     Diabetes Maternal Grandfather     Hypertension Sister     Melanoma Neg Hx      Social History     Tobacco Use    Smoking status: Former Smoker     Packs/day: 1.00     Years: 10.00     Pack years: 10.00    Smokeless tobacco: Never Used   Substance Use Topics    Alcohol use: No    Drug use: No     Review of Systems   HENT: Positive for sore throat.    Respiratory: Positive for cough. Negative for shortness of breath.    Cardiovascular: Negative for chest pain.   Gastrointestinal: Positive for vomiting. Negative for abdominal pain, blood in stool, constipation, diarrhea and nausea.   Genitourinary: Negative for dysuria and hematuria.   Neurological: Positive for dizziness. Negative for headaches.       Physical Exam     Initial Vitals [12/03/18 2148]   BP Pulse Resp Temp SpO2   (!) 128/91 100 20 98.3 °F (36.8 °C) 100 %      MAP       --         Physical Exam    Nursing note and vitals reviewed.  Constitutional: He appears well-developed and well-nourished.   HENT:   Pt spitting up active bright red  and brownish color mucous,   Not coughing    Cardiovascular: Normal rate, regular rhythm, normal heart sounds and intact distal pulses.   Pulmonary/Chest: Breath sounds normal.   Abdominal: Soft. Normal appearance and bowel sounds are normal.   Neurological: He is alert and oriented to person, place, and time.   Skin: Skin is warm and dry.         ED Course   Procedures  Labs Reviewed   CBC W/ AUTO DIFFERENTIAL   COMPREHENSIVE METABOLIC PANEL   PROTIME-INR   APTT   TYPE & SCREEN   ISTAT CHEM8   PREPARE RBC SOFT          Imaging Results    None          Medical Decision Making:   Differential Diagnosis:   Gastric ulcer, esophageal varices, gastritis, esophagitis  ED Management:  53-year-old male with history of GI bleed presenting with symptoms concerning for upper GI bleed.  Patient given Protonix in the ED, fluid resuscitation held at this time giving history of kidney disease and his need for dialysis, patient not hypotensive.   Labs reveal hemoglobin of 14 better than baseline,   Patient has been persistently spitting up bright red blood in the ED.   Plan to admit to observation                       Clinical Impression:   The encounter diagnosis was GI bleed.      Disposition:   Disposition: Admitted  Condition: Stable                        Wade Savage PA-C  12/04/18 0141

## 2018-12-04 NOTE — PLAN OF CARE
Problem: Patient Care Overview  Goal: Plan of Care Review  Recommendations     Recommendation/Intervention: 1. Should pt be cleared for po diet, recommend renal diet with texture per SLP along with Novasource ONS TID. 2. Should pt require enteral feeding, initiate Novasource renal formula at 10ml/hr and advance 10ml Q4hrs to goal rate of 40ml/hr (provides 1920kcal, 87g pro, 691ml free water). Provide additional 500ml water flush/24 hrs. Hold for residuals >500ml.  Goals: 1. Pt to receive nutrition < 48 hrs.    Assessment and Plan  Severe Malnutrition in the context of Social/Environmental Circumstances     Related to (etiology):  Unknown etiology     Signs and Symptoms (as evidenced by):  Energy Intake: per pt, <75% intake over the last year  Body Fat Depletion: overall subcutaneous fat loss, moderate triceps fat loss and protruding patellas.  Muscle Mass Depletion:  moderate muscle wasting of interosseous, temporal, clavicle, calves and quadriceps  Weight Loss: 24% (39 lbs) x 1 year

## 2018-12-04 NOTE — ED NOTES
Assumed care of patient after receiving report from ISMAEL Wu. I acknowledge care for this patient. The patient is awake, alert, and cooperating with a calm affect. RR spontaneous, even, and unlabored, with regular effort and rate. Patient is in NAD, and resting calmly on stretcher with blood pressure cuff, pulse ox, and cardiac monitor attached. Bed locked in low position with side rails up x2 for safety, and call bell within reach. Patient is aware of POC, and has no complaints or concerns at this time. Will continue to monitor.

## 2018-12-04 NOTE — [facility] SECONDARY REVIEW
Physician Advisor External    Level of Care Issue    Denied Observation- 12/04/2018 @ 1000- per Dr. Zay Caldera, recommendation is Inpatient.

## 2018-12-04 NOTE — H&P
"[facility] Medicine  History & Physical    Patient Name: Vaughn Retana  MRN: [id number]  Admission Date: 12/3/2018  Attending Physician: Lainey Wheat MD   Primary Care Provider: Primary Doctor [facility] Medicine Team: Networked reference to record PCT  Lou Belle PA-C     Patient information was obtained from patient, past medical records and ER records.     Subjective:     Principal Problem:GI bleed    Chief Complaint:   Chief Complaint   Patient presents with    Cough     Pt reports productive cough and sore throat X1 day. Pt denies N/V, fever, chest pain, abdominal pain, SOB. Pt spitting up brown/red mucus in triage. Pt denies being on blood thinners.         HPI: A 53 year old black male with history of GI bleed, uncontrolled hypertension, CVA, chronic diastolic heart failure, and end stage renal disease on dialysis (Monday, Wednesday, Friday) presents to the emergency department with complaints of "coughing up blood" for 1 day. Patient states that he began spitting up a brown-red tinged fluid/mucus Monday when he was eating. He does admit this happening to him before. Patient denies any chest pain, shortness of breath, nausea, or abdominal pain. He states that has not noticed any bloody or dark colored stools. He states that he has not been taking any of his home medications. He was able to complete dialysis yesterday without any issues. He reports no aggravating or alleviating factors. He has tried no medications at home.  He denies chest pain, SOB, dizziness, palpitations, fever/chills, N/V/D, abdominal pain.    Past Medical History:   Diagnosis Date    Amputation stump pain 9/10/2013    Aspiration pneumonia 7/27/2015    Asterixis 11/8/2016    C. difficile colitis 8/7/2015    Cholelithiasis without obstruction 8/25/2015    Chronic diastolic heart failure     2-23-17   1 - Low normal to mildly depressed left ventricular systolic function (EF 50-55%).    2 " - Right ventricular enlargement with mildly depressed systolic function.    3 - Left ventricular diastolic dysfunction.    4 - Right atrial enlargement.    5 - Severe tricuspid regurgitation.    6 - Pulmonary hypertension. The estimated PA systolic pressure is 86 mmHg.    7 - Increased central venous pressure.    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    Metabolic acidosis, IAG, reduced excretion of inorganic acids     Myoclonic jerking 9/20/2016    Noncompliance with medication regimen 12/4/2018    Secondary hyperparathyroidism (of renal origin)     Secondary pulmonary hypertension 3/23/2017    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    TB lung, latent 08/25/2015    Negative Quantiferon Gold 3-23-17       Past Surgical History:   Procedure Laterality Date    AMPUTATION, BELOW KNEE Left 12/18/2013    Performed by Elgin Houston MD at [facility] OR 1ST FLR    COLONOSCOPY      COLONOSCOPY N/A 4/4/2017    Procedure: COLONOSCOPY;  Surgeon: Walker Stern MD;  Location: [facility] ([address] [place] [address]);  Service: Endoscopy;  Laterality: N/A;  PA Systolic Pressure 85.56. HD Patient MWF, K+ lab prior to procedure.     COLONOSCOPY N/A 4/4/2017    Performed by Walker Stern MD at [facility] (2ND FLR)    EGD (ESOPHAGOGASTRODUODENOSCOPY) N/A 6/12/2018    Performed by Man Galicia MD at [facility] (2ND FLR)    ESOPHAGOGASTRODUODENOSCOPY N/A 6/12/2018    Procedure: EGD  (ESOPHAGOGASTRODUODENOSCOPY);  Surgeon: Man Galicia MD;  Location: [facility] (2ND FLR);  Service: Endoscopy;  Laterality: N/A;  EGD in 8-12 weeks with Dr. Galicia on 4th floor for follow up erosive esophagitis and Mejia's surveillance.    Patient should be on Pantoprazole 40mg every 12 hours or the equivulant of another PPI    awaiting for patient to reply back regarding changing    ESOPHAGOGASTRODUODENOSCOPY (EGD) N/A 5/22/2018    Performed by Ke Sparks MD at [facility] (2ND FLR)    ESOPHAGOGASTRODUODENOSCOPY (EGD) N/A 3/16/2018    Performed by Kevin De La Paz MD at [facility] (2ND FLR)    ESOPHAGOGASTRODUODENOSCOPY (EGD) N/A 3/8/2018    Performed by Man Galicia MD at [facility] (2ND FLR)    ESOPHAGOGASTRODUODENOSCOPY (EGD) N/A 4/4/2017    Performed by Walker Stern MD at [facility] (2ND FLR)    ESOPHAGOGASTRODUODENOSCOPY (EGD) N/A 10/17/2014    Performed by Man Galicia MD at [facility] (2ND FLR)    FISTULOGRAM Right 9/18/2014    Performed by Grayson [facility] at [facility] CATH LAB    FOOT AMPUTATION THROUGH METATARSAL      left foot    LEG AMPUTATION THROUGH KNEE  12/18/2013    left BKA    R AVF  9/12/12    UPPER GASTROINTESTINAL ENDOSCOPY         Review of patient's allergies indicates:   Allergen Reactions    Fosrenol [lanthanum] Nausea And Vomiting     Nausea and vomiting       No current facility-administered medications on file prior to encounter.      Current Outpatient Medications on File Prior to Encounter   Medication Sig    amLODIPine (NORVASC) 10 MG tablet Take 1 tablet (10 mg total) by mouth once daily.    atorvastatin (LIPITOR) 80 MG tablet Take 1 tablet (80 mg total) by mouth every evening.    carvedilol (COREG) 25 MG tablet Take 1 tablet (25 mg total) by mouth 2 (two) times daily with meals.    chlorproMAZINE (THORAZINE) 25 MG tablet Take 1 tablet (25 mg total) by mouth every 12 (twelve) hours as needed.    dicyclomine (BENTYL) 10 MG capsule Take 1 capsule (10 mg total)  by mouth before meals as needed (TID PRN). For belching    famotidine (PEPCID) 40 MG tablet Take 1 tablet (40 mg total) by mouth 2 (two) times daily.    hydrALAZINE (APRESOLINE) 50 MG tablet Take 1 tablet (50 mg total) by mouth every 12 (twelve) hours.    ondansetron (ZOFRAN) 8 MG tablet Take 1 tablet (8 mg total) by mouth every 8 (eight) hours as needed for Nausea.    pantoprazole (PROTONIX) 40 MG tablet Take 1 tablet (40 mg total) by mouth 2 (two) times daily before meals.    RENAPLEX-D 800 mcg-12.5 mg -2,000 unit Tab Take 1 tablet by mouth once daily.    sevelamer carbonate (RENVELA) 800 mg Tab TAKE 2 TABLETS BY MOUTH THREE TIMES A DAY WITH MEALS     Family History     Problem Relation (Age of Onset)    Alcohol abuse Maternal Grandmother    Diabetes Brother, Maternal Grandfather    Early death Mother    Heart disease Father    Hyperlipidemia Father    Hypertension Father, Sister    Kidney disease Father        Tobacco Use    Smoking status: Former Smoker     Packs/day: 1.00     Years: 10.00     Pack years: 10.00    Smokeless tobacco: Never Used   Substance and Sexual Activity    Alcohol use: No    Drug use: No    Sexual activity: Yes     Partners: Female     Birth control/protection: None     Review of Systems   Constitutional: Negative for activity change, appetite change, chills, diaphoresis, fatigue, fever and unexpected weight change.   HENT: Negative for congestion, rhinorrhea, sore throat, trouble swallowing and voice change.    Eyes: Negative for visual disturbance.   Respiratory: Negative for cough, choking, chest tightness, shortness of breath and wheezing.    Cardiovascular: Negative for chest pain, palpitations and leg swelling.   Gastrointestinal: Positive for vomiting (spitting up brown-red fluid/mucus). Negative for abdominal distention, abdominal pain, anal bleeding, blood in stool, constipation, diarrhea and nausea.   Endocrine: Negative for cold intolerance, heat intolerance,  polydipsia and polyuria.   Genitourinary: Negative for dysuria, flank pain, frequency, hematuria and urgency.   Musculoskeletal: Negative for arthralgias, back pain, joint swelling and myalgias.   Skin: Negative for color change and rash.   Neurological: Negative for dizziness, seizures, syncope, facial asymmetry, speech difficulty, weakness, light-headedness, numbness and headaches.   Hematological: Negative for adenopathy. Does not bruise/bleed easily.   Psychiatric/Behavioral: Negative for agitation, confusion, hallucinations and suicidal ideas.     Objective:     Vital Signs (Most Recent):  Temp: 98.5 °F (36.9 °C) (12/04/18 0137)  Pulse: 104 (12/04/18 0206)  Resp: 20 (12/04/18 0137)  BP: (!) 194/111 (12/04/18 0206)  SpO2: 99 % (12/04/18 0206) Vital Signs (24h Range):  Temp:  [98.3 °F (36.8 °C)-98.5 °F (36.9 °C)] 98.5 °F (36.9 °C)  Pulse:  [] 104  Resp:  [20] 20  SpO2:  [98 %-100 %] 99 %  BP: (128-212)/() 194/111     Weight: 61.4 kg (135 lb 5.8 oz)  Body mass index is 21.85 kg/m².    Physical Exam   Constitutional: He is oriented to person, place, and time. He appears well-developed. No distress.   HENT:   Head: Normocephalic and atraumatic.   Eyes: Pupils are equal, round, and reactive to light.   Neck: Neck supple. Carotid bruit is not present. No thyromegaly present.   Cardiovascular: Normal rate and regular rhythm. Exam reveals no gallop.   No murmur heard.  Pulmonary/Chest: Effort normal and breath sounds normal. No respiratory distress. He has no wheezes.   Abdominal: Bowel sounds are normal. He exhibits no distension. There is no splenomegaly or hepatomegaly. There is no tenderness. There is no guarding.   Musculoskeletal: Normal range of motion. He exhibits no edema.   L leg BKA   Neurological: He is alert and oriented to person, place, and time. No cranial nerve deficit or sensory deficit.   Skin: Skin is warm and dry. No rash noted.   Psychiatric: He has a normal mood and affect. His  behavior is normal.         CRANIAL NERVES     CN III, IV, VI   Pupils are equal, round, and reactive to light.       Significant Labs:   CBC:   Recent Labs   Lab 12/03/18  2307 12/03/18  2321 12/04/18  0103   WBC SEE COMMENT  --  9.10   HGB SEE COMMENT  --  14.8   HCT SEE COMMENT 51 44.7   PLT SEE COMMENT  --  141*     CMP:   Recent Labs   Lab 12/04/18  0023      K 4.1   CL 94*   CO2 33*   *   BUN 34*   CREATININE 6.8*   CALCIUM 9.8   PROT 9.1*   ALBUMIN 3.6   BILITOT 0.5   ALKPHOS 94   AST 31   ALT 19   ANIONGAP 13   EGFRNONAA 8.4*     All pertinent labs within the past 24 hours have been reviewed.    Significant Imaging: I have reviewed all pertinent imaging results/findings within the past 24 hours.    Assessment/Plan:     * GI bleed    -Consult GI for bloody emesis  -H/H stable at 14.8/44.7.  Will trend.  -Patient received Protonix 80mg IV in ER.  -Protonix 40 mg IV BID  -EGD in May showed small hiatal hernia, LA Grade D reflux esophagitis, normal stomach, and normal examined duodenum.  -Patient has not had repeat EGD that was recommended in 3 months.  -NPO, aspiration precautions     Hypertensive urgency    -Renovascular hypertension    -SBP as high as 212/102 in ER.  Will give hydralazine 10 mg IV  -Patient noncompliant with medication regimen.  -Restart Norvasc 10 mg daily, Coreg 25 mg BID, hydralazine 50mg BID.       ESRD on hemodialysis    -Chronic kidney disease-mineral and bone disorder  -Secondary hyperparathyroidism of renal origin    -Consult nephrology to keep patient on dialysis schedule for Monday, Wednesday, Friday  -Continue Renvela 1600mg BID WM  - Patient still makes some urine.     Noncompliance    -Patient not taking home medication.  Also not compliant with follow-up  -Counseled on taking home medication  -Restart home medication       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, without long-term current use of insulin    -Last Hbg A1C 4.5 10/2018  -Repeat Hgb  A1C  -NPO SSI       Dyslipidemia    -Restart Lipitor 80 mg daily       Anemia in chronic kidney disease    -H/H stable at 14.8/44.7       VTE Risk Mitigation (From admission, onward)        Ordered     IP VTE HIGH RISK PATIENT  Once      12/04/18 0220     Place sequential compression device  Until discontinued      12/04/18 0220     Place MEL hose  Until discontinued      12/04/18 0220             Lou Belle PA-C  Department of Hospital Medicine   [facility]

## 2018-12-04 NOTE — ASSESSMENT & PLAN NOTE
-Patient not taking home medication.  Also not compliant with follow-up  -Counseled on taking home medication  -Restart home medication

## 2018-12-04 NOTE — ED TRIAGE NOTES
Patient reports to ED, stated reason for visit is coughing, upon, observing patient upon arrival to ED room patient has active hemoptysis, emesis bag with approximately 200-250 mls of liquid emptied.  Patient has continued dry heaving and spitting up blood tinged.  He is AAOX4.  He does answer questions appropriately, last dialyzed today.  States he usually is seen at [facility] for dialysis. He last ate today before his arrival here.

## 2018-12-04 NOTE — ASSESSMENT & PLAN NOTE
-Renovascular hypertension    -SBP as high as 212/102 in ER.  Will give hydralazine 10 mg IV  -Patient noncompliant with medication regimen.  -Restart Norvasc 10 mg daily, Coreg 25 mg BID, hydralazine 50mg BID.

## 2018-12-04 NOTE — ED NOTES
SBAR report given to ISMAEL Crawford for OBS 2.   Transport will be ordered after tele monitor arrives.

## 2018-12-05 PROBLEM — R04.2 HEMOPTYSIS: Status: ACTIVE | Noted: 2018-12-04

## 2018-12-05 PROBLEM — Z86.11 HISTORY OF TB (TUBERCULOSIS): Status: ACTIVE | Noted: 2018-12-05

## 2018-12-05 LAB
ALBUMIN SERPL BCP-MCNC: 3.3 G/DL
ALP SERPL-CCNC: 84 U/L
ALT SERPL W/O P-5'-P-CCNC: 11 U/L
ANION GAP SERPL CALC-SCNC: 14 MMOL/L
AST SERPL-CCNC: 17 U/L
BASOPHILS # BLD AUTO: 0.04 K/UL
BASOPHILS NFR BLD: 0.7 %
BILIRUB SERPL-MCNC: 0.5 MG/DL
BUN SERPL-MCNC: 50 MG/DL
CALCIUM SERPL-MCNC: 9.6 MG/DL
CHLORIDE SERPL-SCNC: 92 MMOL/L
CO2 SERPL-SCNC: 31 MMOL/L
CREAT SERPL-MCNC: 9 MG/DL
DIFFERENTIAL METHOD: ABNORMAL
EOSINOPHIL # BLD AUTO: 0.2 K/UL
EOSINOPHIL NFR BLD: 4.4 %
ERYTHROCYTE [DISTWIDTH] IN BLOOD BY AUTOMATED COUNT: 13.5 %
EST. GFR  (AFRICAN AMERICAN): 6.9 ML/MIN/1.73 M^2
EST. GFR  (NON AFRICAN AMERICAN): 6 ML/MIN/1.73 M^2
GLUCOSE SERPL-MCNC: 85 MG/DL
HCT VFR BLD AUTO: 36.9 %
HCT VFR BLD AUTO: 38.2 %
HCT VFR BLD AUTO: 38.3 %
HCT VFR BLD AUTO: 40.3 %
HGB BLD-MCNC: 12.3 G/DL
HGB BLD-MCNC: 13 G/DL
HGB BLD-MCNC: 13 G/DL
HGB BLD-MCNC: 13.3 G/DL
IMM GRANULOCYTES # BLD AUTO: 0.01 K/UL
IMM GRANULOCYTES NFR BLD AUTO: 0.2 %
LYMPHOCYTES # BLD AUTO: 1.9 K/UL
LYMPHOCYTES NFR BLD: 34.8 %
MAGNESIUM SERPL-MCNC: 2.2 MG/DL
MCH RBC QN AUTO: 31.7 PG
MCHC RBC AUTO-ENTMCNC: 33.9 G/DL
MCV RBC AUTO: 93 FL
MONOCYTES # BLD AUTO: 0.7 K/UL
MONOCYTES NFR BLD: 12 %
NEUTROPHILS # BLD AUTO: 2.6 K/UL
NEUTROPHILS NFR BLD: 47.9 %
NRBC BLD-RTO: 0 /100 WBC
PHOSPHATE SERPL-MCNC: 5.5 MG/DL
PLATELET # BLD AUTO: 139 K/UL
PMV BLD AUTO: 12.6 FL
POCT GLUCOSE: 103 MG/DL (ref 70–110)
POCT GLUCOSE: 79 MG/DL (ref 70–110)
POCT GLUCOSE: 82 MG/DL (ref 70–110)
POTASSIUM SERPL-SCNC: 3.6 MMOL/L
PROT SERPL-MCNC: 8 G/DL
RBC # BLD AUTO: 4.1 M/UL
SODIUM SERPL-SCNC: 137 MMOL/L
WBC # BLD AUTO: 5.51 K/UL

## 2018-12-05 PROCEDURE — 85018 HEMOGLOBIN: CPT | Mod: 91

## 2018-12-05 PROCEDURE — 63600175 PHARM REV CODE 636 W HCPCS: Performed by: PHYSICIAN ASSISTANT

## 2018-12-05 PROCEDURE — 83735 ASSAY OF MAGNESIUM: CPT

## 2018-12-05 PROCEDURE — 11000001 HC ACUTE MED/SURG PRIVATE ROOM

## 2018-12-05 PROCEDURE — 99232 SBSQ HOSP IP/OBS MODERATE 35: CPT | Mod: ,,, | Performed by: PHYSICIAN ASSISTANT

## 2018-12-05 PROCEDURE — 80053 COMPREHEN METABOLIC PANEL: CPT

## 2018-12-05 PROCEDURE — 90935 HEMODIALYSIS ONE EVALUATION: CPT

## 2018-12-05 PROCEDURE — 85025 COMPLETE CBC W/AUTO DIFF WBC: CPT

## 2018-12-05 PROCEDURE — 82962 GLUCOSE BLOOD TEST: CPT

## 2018-12-05 PROCEDURE — 85014 HEMATOCRIT: CPT | Mod: 91

## 2018-12-05 PROCEDURE — 87206 SMEAR FLUORESCENT/ACID STAI: CPT

## 2018-12-05 PROCEDURE — 85014 HEMATOCRIT: CPT

## 2018-12-05 PROCEDURE — 25000003 PHARM REV CODE 250: Performed by: INTERNAL MEDICINE

## 2018-12-05 PROCEDURE — 87116 MYCOBACTERIA CULTURE: CPT

## 2018-12-05 PROCEDURE — 85018 HEMOGLOBIN: CPT

## 2018-12-05 PROCEDURE — 36415 COLL VENOUS BLD VENIPUNCTURE: CPT

## 2018-12-05 PROCEDURE — C9113 INJ PANTOPRAZOLE SODIUM, VIA: HCPCS | Performed by: PHYSICIAN ASSISTANT

## 2018-12-05 PROCEDURE — 25000003 PHARM REV CODE 250: Performed by: PHYSICIAN ASSISTANT

## 2018-12-05 PROCEDURE — 84100 ASSAY OF PHOSPHORUS: CPT

## 2018-12-05 PROCEDURE — 90935 HEMODIALYSIS ONE EVALUATION: CPT | Mod: ,,, | Performed by: INTERNAL MEDICINE

## 2018-12-05 RX ADMIN — FAMOTIDINE 40 MG: 20 TABLET ORAL at 09:12

## 2018-12-05 RX ADMIN — ATORVASTATIN CALCIUM 80 MG: 20 TABLET, FILM COATED ORAL at 09:12

## 2018-12-05 RX ADMIN — SODIUM CHLORIDE 300 ML: 0.9 INJECTION, SOLUTION INTRAVENOUS at 09:12

## 2018-12-05 RX ADMIN — CARVEDILOL 25 MG: 25 TABLET, FILM COATED ORAL at 05:12

## 2018-12-05 RX ADMIN — CARVEDILOL 25 MG: 25 TABLET, FILM COATED ORAL at 07:12

## 2018-12-05 RX ADMIN — SEVELAMER CARBONATE 1600 MG: 800 TABLET, FILM COATED ORAL at 07:12

## 2018-12-05 RX ADMIN — SEVELAMER CARBONATE 1600 MG: 800 TABLET, FILM COATED ORAL at 02:12

## 2018-12-05 RX ADMIN — HYDRALAZINE HYDROCHLORIDE 50 MG: 50 TABLET ORAL at 09:12

## 2018-12-05 RX ADMIN — PANTOPRAZOLE SODIUM 80 MG: 40 INJECTION, POWDER, FOR SOLUTION INTRAVENOUS at 10:12

## 2018-12-05 RX ADMIN — STANDARDIZED SENNA CONCENTRATE AND DOCUSATE SODIUM 1 TABLET: 8.6; 5 TABLET, FILM COATED ORAL at 09:12

## 2018-12-05 NOTE — PROGRESS NOTES
Pt arrived to unit via stretcher. Pt alert & stable. Maintenance pt. Thrill/bruit noted, accessed LT AVF x2 15g needles without difficulty, Qb 400ml/min, Qd 800ml/min, planned UFG 2.0L, orders reviewed.

## 2018-12-05 NOTE — PROGRESS NOTES
IHD completed, blood returned. Pt alert & stable. Thrill/bruit noted, hemostasis 15 minutes, dry occlusive dressing applied to sites. Qb 400ml/min, Qd 800ml/min, UFG 0.5L. Report called. Pt transported to OBS 2 via stretcher with tele.

## 2018-12-05 NOTE — PLAN OF CARE
Problem: Patient Care Overview  Goal: Plan of Care Review  Outcome: Ongoing (interventions implemented as appropriate)  Pt alert and oriented x4. Pt complained of no pain this shift. Pt vitals have been stable. Pt blood sugar has been stable. No signs of active bleeding this shift. Pt scheduled to have dialysis this shift. Pt is on telemetry and is running normal sinus rhythm. Will continue to monitor, will endorse to oncoming nurse.

## 2018-12-05 NOTE — PROGRESS NOTES
[facility] H[facility]ODIAL[facility] NOTE     Patient currently on hemodialysis for removal of uremic toxins and volume.    Patient seen and evaluated on hemodialysis, tolerating treatment, see HD flowsheet for vitals and assessments.      Ultrafiltration goal is 1-2 L     Labs have been reviewed and the dialysate bath has been adjusted.     Assessment/Plan:     HD today for removal of uremic toxins and volume.

## 2018-12-05 NOTE — PLAN OF CARE
Problem: Patient Care Overview  Goal: Plan of Care Review  Plan of care reviewed with Pt. Pt alert and oriented X4 Pt denies any pain or SOB. Pt able to communicate needs and denies any concerns at this time. Pt requesting to keep prosthesis off for comfort. Pt reports ill fitting prosthesis, Case Management aware. Pt with productive cough, suction available prn O2 Sat WNL. Pt expected to go to HD in am. Pt on cardiac monitoring, SR. Nursing will continue to monitor for changes in status.

## 2018-12-06 LAB
ALBUMIN SERPL BCP-MCNC: 3.2 G/DL
ALP SERPL-CCNC: 88 U/L
ALT SERPL W/O P-5'-P-CCNC: 9 U/L
ANION GAP SERPL CALC-SCNC: 10 MMOL/L
AST SERPL-CCNC: 15 U/L
BASOPHILS # BLD AUTO: 0.03 K/UL
BASOPHILS NFR BLD: 0.3 %
BILIRUB SERPL-MCNC: 0.6 MG/DL
BUN SERPL-MCNC: 30 MG/DL
CALCIUM SERPL-MCNC: 9.4 MG/DL
CHLORIDE SERPL-SCNC: 103 MMOL/L
CO2 SERPL-SCNC: 23 MMOL/L
CREAT SERPL-MCNC: 6.9 MG/DL
DIFFERENTIAL METHOD: ABNORMAL
EOSINOPHIL # BLD AUTO: 0.4 K/UL
EOSINOPHIL NFR BLD: 4.4 %
ERYTHROCYTE [DISTWIDTH] IN BLOOD BY AUTOMATED COUNT: 13.6 %
EST. GFR  (AFRICAN AMERICAN): 9.5 ML/MIN/1.73 M^2
EST. GFR  (NON AFRICAN AMERICAN): 8.2 ML/MIN/1.73 M^2
GLUCOSE SERPL-MCNC: 81 MG/DL
HCT VFR BLD AUTO: 38.5 %
HGB BLD-MCNC: 12.7 G/DL
IMM GRANULOCYTES # BLD AUTO: 0.04 K/UL
IMM GRANULOCYTES NFR BLD AUTO: 0.4 %
LYMPHOCYTES # BLD AUTO: 1.2 K/UL
LYMPHOCYTES NFR BLD: 11.6 %
MCH RBC QN AUTO: 31.5 PG
MCHC RBC AUTO-ENTMCNC: 33 G/DL
MCV RBC AUTO: 96 FL
MONOCYTES # BLD AUTO: 1 K/UL
MONOCYTES NFR BLD: 9.4 %
NEUTROPHILS # BLD AUTO: 7.5 K/UL
NEUTROPHILS NFR BLD: 73.9 %
NRBC BLD-RTO: 0 /100 WBC
PLATELET # BLD AUTO: 144 K/UL
PMV BLD AUTO: 12.7 FL
POCT GLUCOSE: 107 MG/DL (ref 70–110)
POCT GLUCOSE: 128 MG/DL (ref 70–110)
POCT GLUCOSE: 151 MG/DL (ref 70–110)
POCT GLUCOSE: 152 MG/DL (ref 70–110)
POTASSIUM SERPL-SCNC: 4.2 MMOL/L
PROT SERPL-MCNC: 8 G/DL
RBC # BLD AUTO: 4.03 M/UL
SODIUM SERPL-SCNC: 136 MMOL/L
WBC # BLD AUTO: 10.11 K/UL

## 2018-12-06 PROCEDURE — 85025 COMPLETE CBC W/AUTO DIFF WBC: CPT

## 2018-12-06 PROCEDURE — 87015 SPECIMEN INFECT AGNT CONCNTJ: CPT

## 2018-12-06 PROCEDURE — 87206 SMEAR FLUORESCENT/ACID STAI: CPT

## 2018-12-06 PROCEDURE — 87116 MYCOBACTERIA CULTURE: CPT

## 2018-12-06 PROCEDURE — 36415 COLL VENOUS BLD VENIPUNCTURE: CPT

## 2018-12-06 PROCEDURE — 11000001 HC ACUTE MED/SURG PRIVATE ROOM

## 2018-12-06 PROCEDURE — 86480 TB TEST CELL IMMUN MEASURE: CPT

## 2018-12-06 PROCEDURE — 25000003 PHARM REV CODE 250: Performed by: PHYSICIAN ASSISTANT

## 2018-12-06 PROCEDURE — 80053 COMPREHEN METABOLIC PANEL: CPT

## 2018-12-06 PROCEDURE — 63600175 PHARM REV CODE 636 W HCPCS: Performed by: PHYSICIAN ASSISTANT

## 2018-12-06 PROCEDURE — C9113 INJ PANTOPRAZOLE SODIUM, VIA: HCPCS | Performed by: PHYSICIAN ASSISTANT

## 2018-12-06 PROCEDURE — 87556 M.TUBERCULO DNA AMP PROBE: CPT

## 2018-12-06 PROCEDURE — 99232 SBSQ HOSP IP/OBS MODERATE 35: CPT | Mod: ,,, | Performed by: PHYSICIAN ASSISTANT

## 2018-12-06 RX ORDER — PANTOPRAZOLE SODIUM 40 MG/1
40 TABLET, DELAYED RELEASE ORAL
Status: DISCONTINUED | OUTPATIENT
Start: 2018-12-06 | End: 2018-12-11 | Stop reason: HOSPADM

## 2018-12-06 RX ORDER — ACETAMINOPHEN 10 MG/ML
1000 INJECTION, SOLUTION INTRAVENOUS ONCE
Status: DISPENSED | OUTPATIENT
Start: 2018-12-07 | End: 2018-12-08

## 2018-12-06 RX ADMIN — FAMOTIDINE 40 MG: 20 TABLET ORAL at 09:12

## 2018-12-06 RX ADMIN — AMLODIPINE BESYLATE 10 MG: 10 TABLET ORAL at 09:12

## 2018-12-06 RX ADMIN — RAMELTEON 8 MG: 8 TABLET, FILM COATED ORAL at 11:12

## 2018-12-06 RX ADMIN — CARVEDILOL 25 MG: 25 TABLET, FILM COATED ORAL at 05:12

## 2018-12-06 RX ADMIN — SEVELAMER CARBONATE 1600 MG: 800 TABLET, FILM COATED ORAL at 05:12

## 2018-12-06 RX ADMIN — STANDARDIZED SENNA CONCENTRATE AND DOCUSATE SODIUM 1 TABLET: 8.6; 5 TABLET, FILM COATED ORAL at 09:12

## 2018-12-06 RX ADMIN — PANTOPRAZOLE SODIUM 80 MG: 40 INJECTION, POWDER, FOR SOLUTION INTRAVENOUS at 09:12

## 2018-12-06 RX ADMIN — PANTOPRAZOLE SODIUM 40 MG: 40 TABLET, DELAYED RELEASE ORAL at 05:12

## 2018-12-06 RX ADMIN — CARVEDILOL 25 MG: 25 TABLET, FILM COATED ORAL at 09:12

## 2018-12-06 RX ADMIN — ATORVASTATIN CALCIUM 80 MG: 20 TABLET, FILM COATED ORAL at 09:12

## 2018-12-06 RX ADMIN — HYDRALAZINE HYDROCHLORIDE 50 MG: 50 TABLET ORAL at 09:12

## 2018-12-06 RX ADMIN — ACETAMINOPHEN 650 MG: 325 TABLET ORAL at 11:12

## 2018-12-06 RX ADMIN — SEVELAMER CARBONATE 1600 MG: 800 TABLET, FILM COATED ORAL at 09:12

## 2018-12-06 NOTE — PROGRESS NOTES
"[facility] Medicine  Progress Note    Patient Name: Vaughn Retana  MRN: [id number]  Patient Class: IP- Inpatient   Admission Date: 12/3/2018  Length of Stay: 2 days  Attending Physician: Lainey Wheat MD  Primary Care Provider: Yvette Zelaya NP    [facility] Medicine Team: [facility] E Rancho Carvalho PA-C    Subjective:     Principal Problem:Hemoptysis    HPI:  A 53 year old black male with history of GI bleed, uncontrolled hypertension, CVA, chronic diastolic heart failure, and end stage renal disease on dialysis (Monday, Wednesday, Friday) presents to the emergency department with complaints of "coughing up blood" for 1 day. Patient states that he began spitting up a brown-red tinged fluid/mucus Monday when he was eating. He does admit this happening to him before. Patient denies any chest pain, shortness of breath, nausea, or abdominal pain. He states that has not noticed any bloody or dark colored stools. He states that he has not been taking any of his home medications. He was able to complete dialysis yesterday without any issues. He reports no aggravating or alleviating factors. He has tried no medications at home.  He denies chest pain, SOB, dizziness, palpitations, fever/chills, N/V/D, abdominal pain.    Hospital Course:  Vaughn Retana was admitted for hemoptysis, with repeated episodes since admission. He remains HDS. As patient with history of TB, TB work-up initiated, will follow. CT chest with multiple ground-glass nodules throughout the left upper and lower lobes concerning for an infectious or inflammatory process and fluid within the mid and distal esophagus. Anticipate discharge pending labs.     Interval History: Pt resting in bed, no acute complaints. He denies any symptoms of fever, chills, night sweats, dysphagia, n/v. Discussed desire for NH placement. Discharge pending TB test.     Review of Systems   Constitutional: Negative for activity " change, appetite change, chills, diaphoresis, fatigue, fever and unexpected weight change.   HENT: Negative for congestion, rhinorrhea, sore throat, trouble swallowing and voice change.    Eyes: Negative for visual disturbance.   Respiratory: Positive for cough (hemoptysis). Negative for choking, chest tightness, shortness of breath and wheezing.    Cardiovascular: Negative for chest pain, palpitations and leg swelling.   Gastrointestinal: Positive for vomiting (spitting up brown-red fluid/mucus). Negative for abdominal distention, abdominal pain, anal bleeding, blood in stool, constipation, diarrhea and nausea.   Endocrine: Negative for cold intolerance, heat intolerance, polydipsia and polyuria.   Genitourinary: Negative for dysuria, flank pain, frequency, hematuria and urgency.   Musculoskeletal: Negative for arthralgias, back pain, joint swelling and myalgias.   Skin: Negative for color change and rash.   Neurological: Negative for dizziness, seizures, syncope, facial asymmetry, speech difficulty, weakness, light-headedness, numbness and headaches.   Hematological: Negative for adenopathy. Does not bruise/bleed easily.   Psychiatric/Behavioral: Negative for agitation, confusion, hallucinations and suicidal ideas.     Objective:     Vital Signs (Most Recent):  Temp: 98.5 °F (36.9 °C) (12/06/18 1150)  Pulse: 80 (12/06/18 1150)  Resp: 18 (12/06/18 1150)  BP: (!) 182/98 (12/06/18 1150)  SpO2: 100 % (12/06/18 1150) Vital Signs (24h Range):  Temp:  [98.2 °F (36.8 °C)-98.5 °F (36.9 °C)] 98.5 °F (36.9 °C)  Pulse:  [70-95] 80  Resp:  [16-18] 18  SpO2:  [96 %-100 %] 100 %  BP: (119-182)/(70-98) 182/98     Weight: 55.3 kg (122 lb)  Body mass index is 19.69 kg/m².    Intake/Output Summary (Last 24 hours) at 12/6/2018 1423  Last data filed at 12/5/2018 1800  Gross per 24 hour   Intake 240 ml   Output --   Net 240 ml      Physical Exam   Constitutional: He is oriented to person, place, and time. He appears well-developed. No  distress.   HENT:   Head: Normocephalic and atraumatic.   Eyes: Pupils are equal, round, and reactive to light.   Neck: Neck supple. Carotid bruit is not present. No thyromegaly present.   Cardiovascular: Normal rate and regular rhythm. Exam reveals no gallop.   No murmur heard.  Pulmonary/Chest: Effort normal. No respiratory distress. He has no wheezes.   Coarse breath sounds in lower lobes, bilaterally    Abdominal: Bowel sounds are normal. He exhibits no distension. There is no splenomegaly or hepatomegaly. There is no tenderness. There is no guarding.   Musculoskeletal: Normal range of motion. He exhibits no edema.   L leg BKA   Neurological: He is alert and oriented to person, place, and time. No cranial nerve deficit or sensory deficit.   Skin: Skin is warm and dry. No rash noted.   Psychiatric: He has a normal mood and affect. His behavior is normal.   Nursing note and vitals reviewed.      Significant Labs: All pertinent labs within the past 24 hours have been reviewed.    Significant Imaging: I have reviewed all pertinent imaging results/findings within the past 24 hours.    Assessment/Plan:      * Hemoptysis    CT chest with ground glass nodules in left lobe and fluid within the mid and distal esophagus --- predisposing the patient to microaspiration   - outpatient EGD and gastric emptying studies ordered however pt has yet to call to schedule--- encourage pt to have completed ASAP  - TB labs ordered--- denies associated symptoms at this time however has a previous history of incarceration per staff MD  - Nurse reports an episode of pink-tinged sputum 12/5   -HDS  -Protonix 40 mg PO BID  -EGD in May showed small hiatal hernia, LA Grade D reflux esophagitis, normal stomach, and normal examined duodenum.  -Patient has not had repeat EGD that was recommended in 3 months-- will have him to schedule it prior to discharge     History of TB (tuberculosis)    - TB Gold + in 2015, repeat 2017 negative  - repeat  testing ordered  - airborne precautions in place   - Chest CT ordered     Noncompliance    -Patient not taking home medication.  Also not compliant with follow-up  -Counseled on taking home medication  -Restart home medication       Hypertensive urgency    Renovascular hypertension  Improved   -SBP as high as 212/102 in ER.  Will give hydralazine 10 mg IV  -Patient noncompliant with medication regimen.  -Restart Norvasc 10 mg daily, Coreg 25 mg BID, hydralazine 50mg BID.       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, without long-term current use of insulin    -Last Hbg A1C 4.5 10/2018  -Repeat Hgb A1C  -NPO SSI       ESRD on hemodialysis    -Chronic kidney disease-mineral and bone disorder  -Secondary hyperparathyroidism of renal origin    -Consult nephrology to keep patient on dialysis schedule for Monday, Wednesday, Friday  -Continue Renvela 1600mg BID WM  - Patient still makes some urine  - HD completed today     Dyslipidemia    -Restart Lipitor 80 mg daily       Anemia in chronic kidney disease    Chronic and stable        VTE Risk Mitigation (From admission, onward)        Ordered     IP VTE HIGH RISK PATIENT  Once      12/04/18 0220     Place sequential compression device  Until discontinued      12/04/18 0220     Place MEL hose  Until discontinued      12/04/18 0220              Rancho Carvalho PA-C  Department of Hospital Medicine   [facility]

## 2018-12-06 NOTE — PLAN OF CARE
Problem: Patient Care Overview  Goal: Plan of Care Review  Outcome: Ongoing (interventions implemented as appropriate)   12/06/18 0525   Coping/Psychosocial   Plan Of Care Reviewed With patient   Pt alert and oriented x4. Pt vitals have been stable. Pt blood sugar has been stable. Denied pain and hemoptysis.  Noncompliant with call light, continues to come out of negative pressure room into the hallway instead of calling for assistance. Will continue to monitor, will endorse to oncoming nurse. Bed in lowest position ,  Call light in reach. Encouraged to call for any assistance and oriented on use of call light. Safety maintained.

## 2018-12-06 NOTE — SUBJECTIVE & OBJECTIVE
Interval History: Nurse reports an episode of pink-tinged sputum. Chart confirmed previous history of TB, will order work-up although pt denies associated fever, chills, weight loss, etc.     Review of Systems   Constitutional: Negative for activity change, appetite change, chills, diaphoresis, fatigue, fever and unexpected weight change.   HENT: Negative for congestion, rhinorrhea, sore throat, trouble swallowing and voice change.    Eyes: Negative for visual disturbance.   Respiratory: Negative for cough, choking, chest tightness, shortness of breath and wheezing.    Cardiovascular: Negative for chest pain, palpitations and leg swelling.   Gastrointestinal: Positive for vomiting (spitting up brown-red fluid/mucus). Negative for abdominal distention, abdominal pain, anal bleeding, blood in stool, constipation, diarrhea and nausea.   Endocrine: Negative for cold intolerance, heat intolerance, polydipsia and polyuria.   Genitourinary: Negative for dysuria, flank pain, frequency, hematuria and urgency.   Musculoskeletal: Negative for arthralgias, back pain, joint swelling and myalgias.   Skin: Negative for color change and rash.   Neurological: Negative for dizziness, seizures, syncope, facial asymmetry, speech difficulty, weakness, light-headedness, numbness and headaches.   Hematological: Negative for adenopathy. Does not bruise/bleed easily.   Psychiatric/Behavioral: Negative for agitation, confusion, hallucinations and suicidal ideas.     Objective:     Vital Signs (Most Recent):  Temp: 98.5 °F (36.9 °C) (12/05/18 1931)  Pulse: 82 (12/05/18 1931)  Resp: 18 (12/05/18 1931)  BP: (!) 159/83 (12/05/18 1931)  SpO2: 96 % (12/05/18 1931) Vital Signs (24h Range):  Temp:  [97.7 °F (36.5 °C)-98.6 °F (37 °C)] 98.5 °F (36.9 °C)  Pulse:  [70-86] 82  Resp:  [16-22] 18  SpO2:  [96 %-100 %] 96 %  BP: ()/(51-89) 159/83     Weight: 55.3 kg (122 lb)  Body mass index is 19.69 kg/m².    Intake/Output Summary (Last 24 hours) at  12/5/2018 2031  Last data filed at 12/5/2018 1800  Gross per 24 hour   Intake 1320 ml   Output 1183 ml   Net 137 ml      Physical Exam   Constitutional: He is oriented to person, place, and time. He appears well-developed. No distress.   HENT:   Head: Normocephalic and atraumatic.   Eyes: Pupils are equal, round, and reactive to light.   Neck: Neck supple. Carotid bruit is not present. No thyromegaly present.   Cardiovascular: Normal rate and regular rhythm. Exam reveals no gallop.   No murmur heard.  Pulmonary/Chest: Effort normal and breath sounds normal. No respiratory distress. He has no wheezes.   Abdominal: Bowel sounds are normal. He exhibits no distension. There is no splenomegaly or hepatomegaly. There is no tenderness. There is no guarding.   Musculoskeletal: Normal range of motion. He exhibits no edema.   L leg BKA   Neurological: He is alert and oriented to person, place, and time. No cranial nerve deficit or sensory deficit.   Skin: Skin is warm and dry. No rash noted.   Psychiatric: He has a normal mood and affect. His behavior is normal.   Nursing note and vitals reviewed.      Significant Labs: All pertinent labs within the past 24 hours have been reviewed.    Significant Imaging: I have reviewed all pertinent imaging results/findings within the past 24 hours.

## 2018-12-06 NOTE — PROGRESS NOTES
Pt completed removed telemetry box, nurse looked around in the room did not see it. Tele notified. Pt remains noncompliant.

## 2018-12-06 NOTE — PROGRESS NOTES
" [facility]  Adult Nutrition  Progress Note    SUMMARY       Recommendations    Recommendation/Intervention:   1. Continue current renal diet and encourage PO intake. Intake adequate at this time.   2. RD following.    Goals: 1. Pt to receive nutrition < 48 hrs.  Nutrition Goal Status: goal met  Communication of RD Recs: other (comment)(POC)    Reason for Assessment    Reason for Assessment: RD follow-up  Diagnosis: other (see comments)(GI bleed)  Relevant Medical History: CVA, uncontrolled HTN, ESRD on HD, DM2, chronic HF    General Information Comments: Pt now on renal diet with good PO intake. On airborne precautions for TB work-up. Last HD 12/5. NFPE completed 12/4.    Nutrition Discharge Planning: unable to assess at this time    Nutrition Risk Screen    Nutrition Risk Screen: no indicators present    Nutrition/Diet History    Do you have any cultural, spiritual, Restoration conflicts, given your current situation?: none  Factors Affecting Nutritional Intake: None identified at this time    Anthropometrics    Temp: 98.2 °F (36.8 °C)  Height Method: Stated  Height: 5' 6" (167.6 cm)  Height (inches): 66 in  Weight Method: Standard Scale  Weight: 55.3 kg (122 lb)  Weight (lb): 122 lb  Ideal Body Weight (IBW), Male: 142 lb  % Ideal Body Weight, Male (lb): 85.92 lb  BMI (Calculated): 19.7  BMI Grade: 18.5-24.9 - normal  Amputation %: 5.9  Total Amputation %: 5.9       Lab/Procedures/Meds    Pertinent Labs Reviewed: reviewed  Pertinent Labs Comments: BUN 30, Crt 6.9, Alb 3.2, HbA1c <4.0  Pertinent Medications Reviewed: reviewed  Pertinent Medications Comments: statin, famotidine, pantoprazole, senna docusate, sevelamer    Physical Findings/Assessment    Overall Physical Appearance: loss of muscle mass, loss of subcutaneous fat, weak, amputee(left BKA)  Skin: intact    Estimated/Assessed Needs    Weight Used For Calorie Calculations: 55.3 kg (121 lb 14.6 oz)  Energy Calorie Requirements (kcal): " 1936  Energy Need Method: Kcal/kg(35 kcal/kg)  Protein Requirements: 83-94g  Weight Used For Protein Calculations: 55.3 kg (121 lb 14.6 oz)  Fluid Requirements (mL): per MD     RDA Method (mL): 1936  CHO Requirement: 50% total kcal      Nutrition Prescription Ordered    Current Diet Order: Renal    Evaluation of Received Nutrient/Fluid Intake    Energy Calories Required: not meeting needs  Protein Required: not meeting needs  Comments: LBM 12/2  % Intake of Estimated Energy Needs: 75 - 100 %  % Meal Intake: 75 - 100 %    Nutrition Risk    Level of Risk/Frequency of Follow-up: low     Assessment and Plan    Severe Malnutrition in the context of Social/Environmental Circumstances     Related to (etiology):  Unknown etiology     Signs and Symptoms (as evidenced by):  Energy Intake: per pt, <75% intake over the last year  Body Fat Depletion: overall subcutaneous fat loss, moderate triceps fat loss and protruding patellas.  Muscle Mass Depletion:  moderate muscle wasting of interosseous, temporal, clavicle, calves and quadriceps  Weight Loss: 24% (39 lbs) x 1 year     Nutrition Diagnosis Status:  Continues    Monitor and Evaluation    Food and Nutrient Intake: energy intake, food and beverage intake, enteral nutrition intake  Food and Nutrient Adminstration: diet order, enteral and parenteral nutrition administration  Knowledge/Beliefs/Attitudes: food and nutrition knowledge/skill  Physical Activity and Function: factors affecting access to physical activity  Anthropometric Measurements: weight, weight change, body mass index  Biochemical Data, Medical Tests and Procedures: electrolyte and renal panel, gastrointestinal profile, glucose/endocrine profile, inflammatory profile, lipid profile  Nutrition-Focused Physical Findings: overall appearance, extremities, muscles and bones, head and eyes, skin     Nutrition Follow-Up    RD Follow-up?: Yes

## 2018-12-06 NOTE — ASSESSMENT & PLAN NOTE
Renovascular hypertension  Improved   -SBP as high as 212/102 in ER.  Will give hydralazine 10 mg IV  -Patient noncompliant with medication regimen.  -Restart Norvasc 10 mg daily, Coreg 25 mg BID, hydralazine 50mg BID.

## 2018-12-06 NOTE — ASSESSMENT & PLAN NOTE
Nurse reports an episode of pink-tinged sputum, per nolberto   -H/H stable at 14.8/44.7.  Will trend.  -Patient received Protonix 80mg IV in ER.  -Protonix 40 mg IV BID  -EGD in May showed small hiatal hernia, LA Grade D reflux esophagitis, normal stomach, and normal examined duodenum.  -Patient has not had repeat EGD that was recommended in 3 months-- will have him to schedule it prior to discharge

## 2018-12-06 NOTE — ASSESSMENT & PLAN NOTE
-Chronic kidney disease-mineral and bone disorder  -Secondary hyperparathyroidism of renal origin    -Consult nephrology to keep patient on dialysis schedule for Monday, Wednesday, Friday  -Continue Renvela 1600mg BID WM  - Patient still makes some urine  - HD completed today

## 2018-12-06 NOTE — ASSESSMENT & PLAN NOTE
- TB Gold + in 2015, repeat 2017 negative  - repeat testing ordered  - airborne precautions in place   - Chest CT ordered

## 2018-12-06 NOTE — HOSPITAL COURSE
Vaughn Retana was admitted for hemoptysis, with repeated episodes since admission. He remains HDS. As patient with history of TB, TB work-up initiated, will follow. CT chest with multiple ground-glass nodules throughout the left upper and lower lobes concerning for an infectious or inflammatory process and fluid within the mid and distal esophagus. Anticipate discharge to nursing home pending TB clearance. AFB smears x 3 negative for AFB, airborne precautions discontinued. Patient discharged to nursing home.

## 2018-12-06 NOTE — PROGRESS NOTES
"[facility] Hospital Medicine  Progress Note    Patient Name: Vaughn Retana  MRN: [id number]  Patient Class: IP- Inpatient   Admission Date: 12/3/2018  Length of Stay: 1 days  Attending Physician: Lainey Wheat MD  Primary Care Provider: Yvette Zelaya NP    [facility] Medicine Team: [facility] HOSP MED E Rancho Carvalho PA-C    Subjective:     Principal Problem:Hemoptysis    HPI:  A 53 year old black male with history of GI bleed, uncontrolled hypertension, CVA, chronic diastolic heart failure, and end stage renal disease on dialysis (Monday, Wednesday, Friday) presents to the emergency department with complaints of "coughing up blood" for 1 day. Patient states that he began spitting up a brown-red tinged fluid/mucus Monday when he was eating. He does admit this happening to him before. Patient denies any chest pain, shortness of breath, nausea, or abdominal pain. He states that has not noticed any bloody or dark colored stools. He states that he has not been taking any of his home medications. He was able to complete dialysis yesterday without any issues. He reports no aggravating or alleviating factors. He has tried no medications at home.  He denies chest pain, SOB, dizziness, palpitations, fever/chills, N/V/D, abdominal pain.    Hospital Course:  History of TB with pink tinged sputum. TB work-up ordered.    Interval History: Nurse reports an episode of pink-tinged sputum. Chart confirmed previous history of TB, will order work-up although pt denies associated fever, chills, weight loss, etc.     Review of Systems   Constitutional: Negative for activity change, appetite change, chills, diaphoresis, fatigue, fever and unexpected weight change.   HENT: Negative for congestion, rhinorrhea, sore throat, trouble swallowing and voice change.    Eyes: Negative for visual disturbance.   Respiratory: Negative for cough, choking, chest tightness, shortness of breath and wheezing.    Cardiovascular: " Negative for chest pain, palpitations and leg swelling.   Gastrointestinal: Positive for vomiting (spitting up brown-red fluid/mucus). Negative for abdominal distention, abdominal pain, anal bleeding, blood in stool, constipation, diarrhea and nausea.   Endocrine: Negative for cold intolerance, heat intolerance, polydipsia and polyuria.   Genitourinary: Negative for dysuria, flank pain, frequency, hematuria and urgency.   Musculoskeletal: Negative for arthralgias, back pain, joint swelling and myalgias.   Skin: Negative for color change and rash.   Neurological: Negative for dizziness, seizures, syncope, facial asymmetry, speech difficulty, weakness, light-headedness, numbness and headaches.   Hematological: Negative for adenopathy. Does not bruise/bleed easily.   Psychiatric/Behavioral: Negative for agitation, confusion, hallucinations and suicidal ideas.     Objective:     Vital Signs (Most Recent):  Temp: 98.5 °F (36.9 °C) (12/05/18 1931)  Pulse: 82 (12/05/18 1931)  Resp: 18 (12/05/18 1931)  BP: (!) 159/83 (12/05/18 1931)  SpO2: 96 % (12/05/18 1931) Vital Signs (24h Range):  Temp:  [97.7 °F (36.5 °C)-98.6 °F (37 °C)] 98.5 °F (36.9 °C)  Pulse:  [70-86] 82  Resp:  [16-22] 18  SpO2:  [96 %-100 %] 96 %  BP: ()/(51-89) 159/83     Weight: 55.3 kg (122 lb)  Body mass index is 19.69 kg/m².    Intake/Output Summary (Last 24 hours) at 12/5/2018 2031  Last data filed at 12/5/2018 1800  Gross per 24 hour   Intake 1320 ml   Output 1183 ml   Net 137 ml      Physical Exam   Constitutional: He is oriented to person, place, and time. He appears well-developed. No distress.   HENT:   Head: Normocephalic and atraumatic.   Eyes: Pupils are equal, round, and reactive to light.   Neck: Neck supple. Carotid bruit is not present. No thyromegaly present.   Cardiovascular: Normal rate and regular rhythm. Exam reveals no gallop.   No murmur heard.  Pulmonary/Chest: Effort normal and breath sounds normal. No respiratory distress. He  has no wheezes.   Abdominal: Bowel sounds are normal. He exhibits no distension. There is no splenomegaly or hepatomegaly. There is no tenderness. There is no guarding.   Musculoskeletal: Normal range of motion. He exhibits no edema.   L leg BKA   Neurological: He is alert and oriented to person, place, and time. No cranial nerve deficit or sensory deficit.   Skin: Skin is warm and dry. No rash noted.   Psychiatric: He has a normal mood and affect. His behavior is normal.   Nursing note and vitals reviewed.      Significant Labs: All pertinent labs within the past 24 hours have been reviewed.    Significant Imaging: I have reviewed all pertinent imaging results/findings within the past 24 hours.    Assessment/Plan:      * Hemoptysis    Nurse reports an episode of pink-tinged sputum, per nolberto   -H/H stable at 14.8/44.7.  Will trend.  -Patient received Protonix 80mg IV in ER.  -Protonix 40 mg IV BID  -EGD in May showed small hiatal hernia, LA Grade D reflux esophagitis, normal stomach, and normal examined duodenum.  -Patient has not had repeat EGD that was recommended in 3 months-- will have him to schedule it prior to discharge     History of TB (tuberculosis)    - TB Gold + in 2015, repeat 2017 negative  - repeat testing ordered  - airborne precautions in place   - Chest CT ordered     Noncompliance    -Patient not taking home medication.  Also not compliant with follow-up  -Counseled on taking home medication  -Restart home medication       Hypertensive urgency    Renovascular hypertension  Improved   -SBP as high as 212/102 in ER.  Will give hydralazine 10 mg IV  -Patient noncompliant with medication regimen.  -Restart Norvasc 10 mg daily, Coreg 25 mg BID, hydralazine 50mg BID.       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, without long-term current use of insulin    -Last Hbg A1C 4.5 10/2018  -Repeat Hgb A1C  -NPO SSI       ESRD on hemodialysis    -Chronic kidney disease-mineral and bone  disorder  -Secondary hyperparathyroidism of renal origin    -Consult nephrology to keep patient on dialysis schedule for Monday, Wednesday, Friday  -Continue Renvela 1600mg BID WM  - Patient still makes some urine  - HD completed today     Dyslipidemia    -Restart Lipitor 80 mg daily       Anemia in chronic kidney disease    -H/H stable at 14.8/44.7       VTE Risk Mitigation (From admission, onward)        Ordered     IP VTE HIGH RISK PATIENT  Once      12/04/18 0220     Place sequential compression device  Until discontinued      12/04/18 0220     Place MEL hose  Until discontinued      12/04/18 0220              Rancho Carvalho PA-C  Department of Hospital Medicine   [facility]

## 2018-12-06 NOTE — PLAN OF CARE
Problem: Fall Risk (Adult)  Intervention: Monitor/Assist with Self Care  Patient alert and orientated , follows commands , bed in low position , call light within reach and patient demonstrated proper usage. Bed locked with brake , Room clutter free and personal items with reach, addressed care plan for today , all questions answered and allow patient time for clarification. Medications and diet fluid balance  instructions with signs and symptoms and the importance to continue once discharged .Reviewed discharged , next  Follow up appointment. Call reviewed and informed of isolation status.

## 2018-12-06 NOTE — ASSESSMENT & PLAN NOTE
CT chest with ground glass nodules in left lobe and fluid within the mid and distal esophagus --- predisposing the patient to microaspiration   - outpatient EGD and gastric emptying studies ordered however pt has yet to call to schedule--- encourage pt to have completed ASAP  - TB labs ordered--- denies associated symptoms at this time however has a previous history of incarceration per staff MD  - Nurse reports an episode of pink-tinged sputum 12/5   -HDS  -Protonix 40 mg PO BID  -EGD in May showed small hiatal hernia, LA Grade D reflux esophagitis, normal stomach, and normal examined duodenum.  -Patient has not had repeat EGD that was recommended in 3 months-- will have him to schedule it prior to discharge

## 2018-12-06 NOTE — SUBJECTIVE & OBJECTIVE
Interval History: Pt resting in bed, no acute complaints. He denies any symptoms of fever, chills, night sweats, dysphagia, n/v. Discussed desire for NH placement. Discharge pending TB test.     Review of Systems   Constitutional: Negative for activity change, appetite change, chills, diaphoresis, fatigue, fever and unexpected weight change.   HENT: Negative for congestion, rhinorrhea, sore throat, trouble swallowing and voice change.    Eyes: Negative for visual disturbance.   Respiratory: Positive for cough (hemoptysis). Negative for choking, chest tightness, shortness of breath and wheezing.    Cardiovascular: Negative for chest pain, palpitations and leg swelling.   Gastrointestinal: Positive for vomiting (spitting up brown-red fluid/mucus). Negative for abdominal distention, abdominal pain, anal bleeding, blood in stool, constipation, diarrhea and nausea.   Endocrine: Negative for cold intolerance, heat intolerance, polydipsia and polyuria.   Genitourinary: Negative for dysuria, flank pain, frequency, hematuria and urgency.   Musculoskeletal: Negative for arthralgias, back pain, joint swelling and myalgias.   Skin: Negative for color change and rash.   Neurological: Negative for dizziness, seizures, syncope, facial asymmetry, speech difficulty, weakness, light-headedness, numbness and headaches.   Hematological: Negative for adenopathy. Does not bruise/bleed easily.   Psychiatric/Behavioral: Negative for agitation, confusion, hallucinations and suicidal ideas.     Objective:     Vital Signs (Most Recent):  Temp: 98.5 °F (36.9 °C) (12/06/18 1150)  Pulse: 80 (12/06/18 1150)  Resp: 18 (12/06/18 1150)  BP: (!) 182/98 (12/06/18 1150)  SpO2: 100 % (12/06/18 1150) Vital Signs (24h Range):  Temp:  [98.2 °F (36.8 °C)-98.5 °F (36.9 °C)] 98.5 °F (36.9 °C)  Pulse:  [70-95] 80  Resp:  [16-18] 18  SpO2:  [96 %-100 %] 100 %  BP: (119-182)/(70-98) 182/98     Weight: 55.3 kg (122 lb)  Body mass index is 19.69  kg/m².    Intake/Output Summary (Last 24 hours) at 12/6/2018 1423  Last data filed at 12/5/2018 1800  Gross per 24 hour   Intake 240 ml   Output --   Net 240 ml      Physical Exam   Constitutional: He is oriented to person, place, and time. He appears well-developed. No distress.   HENT:   Head: Normocephalic and atraumatic.   Eyes: Pupils are equal, round, and reactive to light.   Neck: Neck supple. Carotid bruit is not present. No thyromegaly present.   Cardiovascular: Normal rate and regular rhythm. Exam reveals no gallop.   No murmur heard.  Pulmonary/Chest: Effort normal. No respiratory distress. He has no wheezes.   Coarse breath sounds in lower lobes, bilaterally    Abdominal: Bowel sounds are normal. He exhibits no distension. There is no splenomegaly or hepatomegaly. There is no tenderness. There is no guarding.   Musculoskeletal: Normal range of motion. He exhibits no edema.   L leg BKA   Neurological: He is alert and oriented to person, place, and time. No cranial nerve deficit or sensory deficit.   Skin: Skin is warm and dry. No rash noted.   Psychiatric: He has a normal mood and affect. His behavior is normal.   Nursing note and vitals reviewed.      Significant Labs: All pertinent labs within the past 24 hours have been reviewed.    Significant Imaging: I have reviewed all pertinent imaging results/findings within the past 24 hours.

## 2018-12-07 LAB
ALBUMIN SERPL BCP-MCNC: 2.9 G/DL
ALP SERPL-CCNC: 77 U/L
ALT SERPL W/O P-5'-P-CCNC: 9 U/L
ANION GAP SERPL CALC-SCNC: 14 MMOL/L
AST SERPL-CCNC: 26 U/L
BASOPHILS # BLD AUTO: 0.04 K/UL
BASOPHILS NFR BLD: 0.7 %
BILIRUB SERPL-MCNC: 0.3 MG/DL
BLD PROD TYP BPU: NORMAL
BLD PROD TYP BPU: NORMAL
BLOOD UNIT EXPIRATION DATE: NORMAL
BLOOD UNIT EXPIRATION DATE: NORMAL
BLOOD UNIT TYPE CODE: 6200
BLOOD UNIT TYPE CODE: 6200
BLOOD UNIT TYPE: NORMAL
BLOOD UNIT TYPE: NORMAL
BUN SERPL-MCNC: 40 MG/DL
CALCIUM SERPL-MCNC: 9.5 MG/DL
CHLORIDE SERPL-SCNC: 103 MMOL/L
CO2 SERPL-SCNC: 16 MMOL/L
CODING SYSTEM: NORMAL
CODING SYSTEM: NORMAL
CREAT SERPL-MCNC: 8.6 MG/DL
DIFFERENTIAL METHOD: ABNORMAL
DISPENSE STATUS: NORMAL
DISPENSE STATUS: NORMAL
EOSINOPHIL # BLD AUTO: 0.5 K/UL
EOSINOPHIL NFR BLD: 8.5 %
ERYTHROCYTE [DISTWIDTH] IN BLOOD BY AUTOMATED COUNT: 14.1 %
EST. GFR  (AFRICAN AMERICAN): 7.3 ML/MIN/1.73 M^2
EST. GFR  (NON AFRICAN AMERICAN): 6.3 ML/MIN/1.73 M^2
GLUCOSE SERPL-MCNC: 88 MG/DL
HCT VFR BLD AUTO: 37 %
HGB BLD-MCNC: 11.7 G/DL
IMM GRANULOCYTES # BLD AUTO: 0.02 K/UL
IMM GRANULOCYTES NFR BLD AUTO: 0.3 %
LYMPHOCYTES # BLD AUTO: 1.4 K/UL
LYMPHOCYTES NFR BLD: 24.5 %
M TB IFN-G CD4+ BCKGRND COR BLD-ACNC: 0.39 IU/ML
MAGNESIUM SERPL-MCNC: 2.6 MG/DL
MCH RBC QN AUTO: 31.5 PG
MCHC RBC AUTO-ENTMCNC: 31.6 G/DL
MCV RBC AUTO: 100 FL
MITOGEN IGNF BCKGRD COR BLD-ACNC: 7.61 IU/ML
MITOGEN IGNF BCKGRD COR BLD-ACNC: POSITIVE [IU]/ML
MONOCYTES # BLD AUTO: 0.7 K/UL
MONOCYTES NFR BLD: 11.6 %
NEUTROPHILS # BLD AUTO: 3.1 K/UL
NEUTROPHILS NFR BLD: 54.4 %
NIL: 0.1 IU/ML
NRBC BLD-RTO: 0 /100 WBC
NUM UNITS TRANS PACKED RBC: NORMAL
NUM UNITS TRANS PACKED RBC: NORMAL
PHOSPHATE SERPL-MCNC: 4.1 MG/DL
PLATELET # BLD AUTO: 125 K/UL
PMV BLD AUTO: 12.5 FL
POCT GLUCOSE: 138 MG/DL (ref 70–110)
POCT GLUCOSE: 140 MG/DL (ref 70–110)
POCT GLUCOSE: 85 MG/DL (ref 70–110)
POCT GLUCOSE: 92 MG/DL (ref 70–110)
POTASSIUM SERPL-SCNC: 4.8 MMOL/L
PROT SERPL-MCNC: 7.5 G/DL
RBC # BLD AUTO: 3.72 M/UL
SODIUM SERPL-SCNC: 133 MMOL/L
TB2 - NIL: 0.29 IU/ML
WBC # BLD AUTO: 5.76 K/UL

## 2018-12-07 PROCEDURE — 80053 COMPREHEN METABOLIC PANEL: CPT

## 2018-12-07 PROCEDURE — 84100 ASSAY OF PHOSPHORUS: CPT

## 2018-12-07 PROCEDURE — 99232 SBSQ HOSP IP/OBS MODERATE 35: CPT | Mod: ,,, | Performed by: PHYSICIAN ASSISTANT

## 2018-12-07 PROCEDURE — 25000003 PHARM REV CODE 250: Performed by: PHYSICIAN ASSISTANT

## 2018-12-07 PROCEDURE — 85025 COMPLETE CBC W/AUTO DIFF WBC: CPT

## 2018-12-07 PROCEDURE — 11000001 HC ACUTE MED/SURG PRIVATE ROOM

## 2018-12-07 PROCEDURE — 83735 ASSAY OF MAGNESIUM: CPT

## 2018-12-07 PROCEDURE — 36415 COLL VENOUS BLD VENIPUNCTURE: CPT

## 2018-12-07 RX ORDER — SODIUM CHLORIDE 9 MG/ML
INJECTION, SOLUTION INTRAVENOUS ONCE
Status: DISCONTINUED | OUTPATIENT
Start: 2018-12-07 | End: 2018-12-09

## 2018-12-07 RX ORDER — SODIUM CHLORIDE 9 MG/ML
INJECTION, SOLUTION INTRAVENOUS
Status: DISCONTINUED | OUTPATIENT
Start: 2018-12-07 | End: 2018-12-09

## 2018-12-07 RX ADMIN — PANTOPRAZOLE SODIUM 40 MG: 40 TABLET, DELAYED RELEASE ORAL at 04:12

## 2018-12-07 RX ADMIN — HYDRALAZINE HYDROCHLORIDE 50 MG: 50 TABLET ORAL at 08:12

## 2018-12-07 RX ADMIN — PANTOPRAZOLE SODIUM 40 MG: 40 TABLET, DELAYED RELEASE ORAL at 06:12

## 2018-12-07 RX ADMIN — CARVEDILOL 25 MG: 25 TABLET, FILM COATED ORAL at 04:12

## 2018-12-07 RX ADMIN — SEVELAMER CARBONATE 1600 MG: 800 TABLET, FILM COATED ORAL at 09:12

## 2018-12-07 RX ADMIN — ACETAMINOPHEN 650 MG: 325 TABLET ORAL at 12:12

## 2018-12-07 RX ADMIN — SEVELAMER CARBONATE 1600 MG: 800 TABLET, FILM COATED ORAL at 04:12

## 2018-12-07 RX ADMIN — SEVELAMER CARBONATE 1600 MG: 800 TABLET, FILM COATED ORAL at 12:12

## 2018-12-07 RX ADMIN — AMLODIPINE BESYLATE 10 MG: 10 TABLET ORAL at 09:12

## 2018-12-07 RX ADMIN — CARVEDILOL 25 MG: 25 TABLET, FILM COATED ORAL at 09:12

## 2018-12-07 RX ADMIN — ATORVASTATIN CALCIUM 80 MG: 20 TABLET, FILM COATED ORAL at 08:12

## 2018-12-07 RX ADMIN — HYDRALAZINE HYDROCHLORIDE 50 MG: 50 TABLET ORAL at 09:12

## 2018-12-07 NOTE — PROGRESS NOTES
"[facility] Hospital Medicine  Progress Note    Patient Name: Vaughn Retana  MRN: [id number]  Patient Class: IP- Inpatient   Admission Date: 12/3/2018  Length of Stay: 3 days  Attending Physician: Lainey Wheat MD  Primary Care Provider: Yvette Zelaya NP    [facility] Medicine Team: [facility] HOSP MED E Rancho Carvalho PA-C    Subjective:     Principal Problem:Hemoptysis    HPI:  A 53 year old black male with history of GI bleed, uncontrolled hypertension, CVA, chronic diastolic heart failure, and end stage renal disease on dialysis (Monday, Wednesday, Friday) presents to the emergency department with complaints of "coughing up blood" for 1 day. Patient states that he began spitting up a brown-red tinged fluid/mucus Monday when he was eating. He does admit this happening to him before. Patient denies any chest pain, shortness of breath, nausea, or abdominal pain. He states that has not noticed any bloody or dark colored stools. He states that he has not been taking any of his home medications. He was able to complete dialysis yesterday without any issues. He reports no aggravating or alleviating factors. He has tried no medications at home.  He denies chest pain, SOB, dizziness, palpitations, fever/chills, N/V/D, abdominal pain.    Hospital Course:  Vaughn Retana was admitted for hemoptysis, with repeated episodes since admission. He remains HDS. As patient with history of TB, TB work-up initiated, will follow. CT chest with multiple ground-glass nodules throughout the left upper and lower lobes concerning for an infectious or inflammatory process and fluid within the mid and distal esophagus. Anticipate discharge to nursing home pending TB clearance.     Interval History: AFB not collected, discharge delayed.     Review of Systems   Constitutional: Negative for activity change, appetite change, chills, diaphoresis, fatigue, fever and unexpected weight change.   HENT: Negative for " congestion, rhinorrhea, sore throat, trouble swallowing and voice change.    Eyes: Negative for visual disturbance.   Respiratory: Positive for cough (hemoptysis). Negative for choking, chest tightness, shortness of breath and wheezing.    Cardiovascular: Negative for chest pain, palpitations and leg swelling.   Gastrointestinal: Positive for vomiting (spitting up brown-red fluid/mucus). Negative for abdominal distention, abdominal pain, anal bleeding, blood in stool, constipation, diarrhea and nausea.   Endocrine: Negative for cold intolerance, heat intolerance, polydipsia and polyuria.   Genitourinary: Negative for dysuria, flank pain, frequency, hematuria and urgency.   Musculoskeletal: Negative for arthralgias, back pain, joint swelling and myalgias.   Skin: Negative for color change and rash.   Neurological: Negative for dizziness, seizures, syncope, facial asymmetry, speech difficulty, weakness, light-headedness, numbness and headaches.   Hematological: Negative for adenopathy. Does not bruise/bleed easily.   Psychiatric/Behavioral: Negative for agitation, confusion, hallucinations and suicidal ideas.     Objective:     Vital Signs (Most Recent):  Temp: 96.4 °F (35.8 °C) (12/07/18 1548)  Pulse: 67 (12/07/18 1548)  Resp: 18 (12/07/18 1548)  BP: 121/68 (12/07/18 1548)  SpO2: 98 % (12/07/18 1252) Vital Signs (24h Range):  Temp:  [96.4 °F (35.8 °C)-98.9 °F (37.2 °C)] 96.4 °F (35.8 °C)  Pulse:  [65-89] 67  Resp:  [18-19] 18  SpO2:  [98 %-100 %] 98 %  BP: (113-152)/(64-80) 121/68     Weight: 55.3 kg (122 lb)  Body mass index is 19.69 kg/m².    Intake/Output Summary (Last 24 hours) at 12/7/2018 1629  Last data filed at 12/6/2018 1900  Gross per 24 hour   Intake 480 ml   Output --   Net 480 ml      Physical Exam   Constitutional: He is oriented to person, place, and time. He appears well-developed. No distress.   HENT:   Head: Normocephalic and atraumatic.   Eyes: Pupils are equal, round, and reactive to light.    Neck: Neck supple. Carotid bruit is not present. No thyromegaly present.   Cardiovascular: Normal rate and regular rhythm. Exam reveals no gallop.   No murmur heard.  Pulmonary/Chest: Effort normal. No respiratory distress. He has no wheezes.   Coarse breath sounds in lower lobes, bilaterally    Abdominal: Bowel sounds are normal. He exhibits no distension. There is no splenomegaly or hepatomegaly. There is no tenderness. There is no guarding.   Musculoskeletal: Normal range of motion. He exhibits no edema.   L leg BKA   Neurological: He is alert and oriented to person, place, and time. No cranial nerve deficit or sensory deficit.   Skin: Skin is warm and dry. No rash noted.   Psychiatric: He has a normal mood and affect. His behavior is normal.   Nursing note and vitals reviewed.      Significant Labs: All pertinent labs within the past 24 hours have been reviewed.    Significant Imaging: I have reviewed all pertinent imaging results/findings within the past 24 hours.    Assessment/Plan:      * Hemoptysis    CT chest with ground glass nodules in left lobe and fluid within the mid and distal esophagus --- predisposing the patient to microaspiration   - outpatient EGD and gastric emptying studies ordered however pt has yet to call to schedule--- encourage pt to have completed ASAP  - TB labs ordered--- denies associated symptoms at this time however has a previous history of incarceration per staff MD  - Nurse reports an episode of pink-tinged sputum 12/5   -HDS  -Protonix 40 mg PO BID  -EGD in May showed small hiatal hernia, LA Grade D reflux esophagitis, normal stomach, and normal examined duodenum.  -Patient has not had repeat EGD that was recommended in 3 months-- will have him to schedule it prior to discharge     History of TB (tuberculosis)    - TB Gold + in 2015, repeat 2017 negative  - repeat testing ordered  - airborne precautions in place   - Chest CT ordered     Noncompliance    -Patient not taking home  medication.  Also not compliant with follow-up  -Counseled on taking home medication  -Restart home medication       Hypertensive urgency    Renovascular hypertension  Improved   -SBP as high as 212/102 in ER.  Will give hydralazine 10 mg IV  -Patient noncompliant with medication regimen.  -Restart Norvasc 10 mg daily, Coreg 25 mg BID, hydralazine 50mg BID.       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, without long-term current use of insulin    -Last Hbg A1C 4.5 10/2018  -Repeat Hgb A1C  -NPO SSI       ESRD on hemodialysis    -Chronic kidney disease-mineral and bone disorder  -Secondary hyperparathyroidism of renal origin    -Consult nephrology to keep patient on dialysis schedule for Monday, Wednesday, Friday  -Continue Renvela 1600mg BID WM  - Patient still makes some urine  - HD completed today     Dyslipidemia    -Restart Lipitor 80 mg daily       Anemia in chronic kidney disease    Chronic and stable        VTE Risk Mitigation (From admission, onward)        Ordered     IP VTE HIGH RISK PATIENT  Once      12/04/18 0220     Place sequential compression device  Until discontinued      12/04/18 0220     Place MEL hose  Until discontinued      12/04/18 0220              Rancho Carvalho PA-C  Department of Hospital Medicine   [facility]

## 2018-12-07 NOTE — PLAN OF CARE
12/07/18 1313   Discharge Reassessment   Assessment Type Discharge Planning Reassessment   Do you have any problems affording any of your prescribed medications? No   Discharge Plan A New Nursing Home placement - senior care care facility   Discharge Plan B Skilled Nursing Facility   Can the patient answer the patient profile reliably? Yes, cognitively intact   How does the patient rate their overall health at the present time? Fair   Describe the patient's ability to walk at the present time. Major restrictions/daily assistance from another person   How often would a person be available to care for the patient? Occasionally   Number of comorbid conditions (as recorded on the chart) Five or more     Pt being tested for TB. Plan to discharge patient to NH following results & acceptance. Will continue to follow.

## 2018-12-07 NOTE — SUBJECTIVE & OBJECTIVE
Interval History: AFB not collected, discharge delayed.     Review of Systems   Constitutional: Negative for activity change, appetite change, chills, diaphoresis, fatigue, fever and unexpected weight change.   HENT: Negative for congestion, rhinorrhea, sore throat, trouble swallowing and voice change.    Eyes: Negative for visual disturbance.   Respiratory: Positive for cough (hemoptysis). Negative for choking, chest tightness, shortness of breath and wheezing.    Cardiovascular: Negative for chest pain, palpitations and leg swelling.   Gastrointestinal: Positive for vomiting (spitting up brown-red fluid/mucus). Negative for abdominal distention, abdominal pain, anal bleeding, blood in stool, constipation, diarrhea and nausea.   Endocrine: Negative for cold intolerance, heat intolerance, polydipsia and polyuria.   Genitourinary: Negative for dysuria, flank pain, frequency, hematuria and urgency.   Musculoskeletal: Negative for arthralgias, back pain, joint swelling and myalgias.   Skin: Negative for color change and rash.   Neurological: Negative for dizziness, seizures, syncope, facial asymmetry, speech difficulty, weakness, light-headedness, numbness and headaches.   Hematological: Negative for adenopathy. Does not bruise/bleed easily.   Psychiatric/Behavioral: Negative for agitation, confusion, hallucinations and suicidal ideas.     Objective:     Vital Signs (Most Recent):  Temp: 96.4 °F (35.8 °C) (12/07/18 1548)  Pulse: 67 (12/07/18 1548)  Resp: 18 (12/07/18 1548)  BP: 121/68 (12/07/18 1548)  SpO2: 98 % (12/07/18 1252) Vital Signs (24h Range):  Temp:  [96.4 °F (35.8 °C)-98.9 °F (37.2 °C)] 96.4 °F (35.8 °C)  Pulse:  [65-89] 67  Resp:  [18-19] 18  SpO2:  [98 %-100 %] 98 %  BP: (113-152)/(64-80) 121/68     Weight: 55.3 kg (122 lb)  Body mass index is 19.69 kg/m².    Intake/Output Summary (Last 24 hours) at 12/7/2018 1629  Last data filed at 12/6/2018 1900  Gross per 24 hour   Intake 480 ml   Output --   Net 480  ml      Physical Exam   Constitutional: He is oriented to person, place, and time. He appears well-developed. No distress.   HENT:   Head: Normocephalic and atraumatic.   Eyes: Pupils are equal, round, and reactive to light.   Neck: Neck supple. Carotid bruit is not present. No thyromegaly present.   Cardiovascular: Normal rate and regular rhythm. Exam reveals no gallop.   No murmur heard.  Pulmonary/Chest: Effort normal. No respiratory distress. He has no wheezes.   Coarse breath sounds in lower lobes, bilaterally    Abdominal: Bowel sounds are normal. He exhibits no distension. There is no splenomegaly or hepatomegaly. There is no tenderness. There is no guarding.   Musculoskeletal: Normal range of motion. He exhibits no edema.   L leg BKA   Neurological: He is alert and oriented to person, place, and time. No cranial nerve deficit or sensory deficit.   Skin: Skin is warm and dry. No rash noted.   Psychiatric: He has a normal mood and affect. His behavior is normal.   Nursing note and vitals reviewed.      Significant Labs: All pertinent labs within the past 24 hours have been reviewed.    Significant Imaging: I have reviewed all pertinent imaging results/findings within the past 24 hours.

## 2018-12-07 NOTE — PLAN OF CARE
12/07/18 1428   Post-Acute Status   Post-Acute Authorization Placement   Post-Acute Placement Status Referrals Sent     NATTY spoke with the pt's sister.  SHe had contacted Jerad with [facility] ([phone]) regarding placement for the pt.  Initially the plan was for the pt to dc home and then get placement from home.  However, the pt was changed to inpt and is being tested for TB.  A referral was sent to [facility] in [facility] but Jerad will review him for other facilities that he has.  NATTY called in the locet and faxed the pasrr. NATTY obtained the 142.  NATTY informed Jerad that the pt may be ready on Monday if he is cleared from TB.  He stated that they do not accept pts on airborne precautions.  NATTY left a message for the pt's sister to update her. NATTY will f/u as needed.    Daniela Snider, [facility] x [phone]

## 2018-12-07 NOTE — PLAN OF CARE
Problem: Patient Care Overview  Goal: Plan of Care Review  Outcome: Ongoing (interventions implemented as appropriate)   12/06/18 1802   Coping/Psychosocial   Plan Of Care Reviewed With patient     Pt free of falls/trauma/injuries. Bed in low position, wheels locked, and call light within reach. Skin integrity remains unchanged. CBG monitored as ordered. No distress noted/reported at this time. Will continue to monitor.

## 2018-12-07 NOTE — PLAN OF CARE
Problem: Patient Care Overview  Goal: Plan of Care Review  Outcome: Ongoing (interventions implemented as appropriate)  Patient turned and repositioned self independently without difficulty. No skin breakdown noted. Patient pain and safety monitored q 1-2 hrs this shift. C/o loose tooth and pain to right side of mouth, acetaminophen given as ordered with positive effect. MD made aware with order received for ofirmev, held d/t PRN PO acetaminophen given prior to order received and pain managed well. Blood glucose monitored throughout shift. Ambulates with x1 staff assistance. LLE with BKA, prosthetic at bedside. Bed locked and in lowest position. Bed side rails elevated x2. RICHARD fistula with +thrill/bruit, dressing CDI. Call light and personal belongings in reach. Unable to obtain sputum culture d/t nonproductive cough. No hemoptysis noted at this time. Remains on airborne precautions. PPE worn and aseptic technique maintained.    12/07/18 0808   Coping/Psychosocial   Plan Of Care Reviewed With patient

## 2018-12-08 LAB
ALBUMIN SERPL BCP-MCNC: 2.9 G/DL
ALP SERPL-CCNC: 80 U/L
ALT SERPL W/O P-5'-P-CCNC: 8 U/L
ANION GAP SERPL CALC-SCNC: 11 MMOL/L
AST SERPL-CCNC: 12 U/L
BASOPHILS # BLD AUTO: 0.02 K/UL
BASOPHILS NFR BLD: 0.4 %
BILIRUB SERPL-MCNC: 0.3 MG/DL
BUN SERPL-MCNC: 21 MG/DL
CALCIUM SERPL-MCNC: 9.2 MG/DL
CHLORIDE SERPL-SCNC: 97 MMOL/L
CO2 SERPL-SCNC: 27 MMOL/L
CREAT SERPL-MCNC: 4.7 MG/DL
DIFFERENTIAL METHOD: ABNORMAL
EOSINOPHIL # BLD AUTO: 0.5 K/UL
EOSINOPHIL NFR BLD: 9.1 %
ERYTHROCYTE [DISTWIDTH] IN BLOOD BY AUTOMATED COUNT: 13.4 %
EST. GFR  (AFRICAN AMERICAN): 15.1 ML/MIN/1.73 M^2
EST. GFR  (NON AFRICAN AMERICAN): 13.1 ML/MIN/1.73 M^2
GLUCOSE SERPL-MCNC: 111 MG/DL
HCT VFR BLD AUTO: 35.8 %
HGB BLD-MCNC: 12.4 G/DL
IMM GRANULOCYTES # BLD AUTO: 0.02 K/UL
IMM GRANULOCYTES NFR BLD AUTO: 0.4 %
LYMPHOCYTES # BLD AUTO: 1.1 K/UL
LYMPHOCYTES NFR BLD: 21.5 %
MAGNESIUM SERPL-MCNC: 2 MG/DL
MCH RBC QN AUTO: 32.2 PG
MCHC RBC AUTO-ENTMCNC: 34.6 G/DL
MCV RBC AUTO: 93 FL
MONOCYTES # BLD AUTO: 0.6 K/UL
MONOCYTES NFR BLD: 11.4 %
NEUTROPHILS # BLD AUTO: 2.8 K/UL
NEUTROPHILS NFR BLD: 57.2 %
NRBC BLD-RTO: 0 /100 WBC
PHOSPHATE SERPL-MCNC: 2.2 MG/DL
PLATELET # BLD AUTO: 145 K/UL
PMV BLD AUTO: 11.7 FL
POCT GLUCOSE: 111 MG/DL (ref 70–110)
POCT GLUCOSE: 130 MG/DL (ref 70–110)
POCT GLUCOSE: 136 MG/DL (ref 70–110)
POCT GLUCOSE: 150 MG/DL (ref 70–110)
POCT GLUCOSE: 164 MG/DL (ref 70–110)
POTASSIUM SERPL-SCNC: 3.5 MMOL/L
PROT SERPL-MCNC: 7.4 G/DL
RBC # BLD AUTO: 3.85 M/UL
SODIUM SERPL-SCNC: 135 MMOL/L
WBC # BLD AUTO: 4.93 K/UL

## 2018-12-08 PROCEDURE — 11000001 HC ACUTE MED/SURG PRIVATE ROOM

## 2018-12-08 PROCEDURE — 25000003 PHARM REV CODE 250: Performed by: PHYSICIAN ASSISTANT

## 2018-12-08 PROCEDURE — 80100014 HC HEMODIALYSIS 1:1

## 2018-12-08 PROCEDURE — 99232 SBSQ HOSP IP/OBS MODERATE 35: CPT | Mod: ,,, | Performed by: PHYSICIAN ASSISTANT

## 2018-12-08 PROCEDURE — 84100 ASSAY OF PHOSPHORUS: CPT

## 2018-12-08 PROCEDURE — 83735 ASSAY OF MAGNESIUM: CPT

## 2018-12-08 PROCEDURE — 36415 COLL VENOUS BLD VENIPUNCTURE: CPT

## 2018-12-08 PROCEDURE — 80053 COMPREHEN METABOLIC PANEL: CPT

## 2018-12-08 PROCEDURE — 85025 COMPLETE CBC W/AUTO DIFF WBC: CPT

## 2018-12-08 RX ADMIN — ATORVASTATIN CALCIUM 80 MG: 20 TABLET, FILM COATED ORAL at 08:12

## 2018-12-08 RX ADMIN — CARVEDILOL 25 MG: 25 TABLET, FILM COATED ORAL at 05:12

## 2018-12-08 RX ADMIN — HYDRALAZINE HYDROCHLORIDE 50 MG: 50 TABLET ORAL at 08:12

## 2018-12-08 RX ADMIN — AMLODIPINE BESYLATE 10 MG: 10 TABLET ORAL at 08:12

## 2018-12-08 RX ADMIN — SEVELAMER CARBONATE 1600 MG: 800 TABLET, FILM COATED ORAL at 05:12

## 2018-12-08 RX ADMIN — CARVEDILOL 25 MG: 25 TABLET, FILM COATED ORAL at 08:12

## 2018-12-08 RX ADMIN — PANTOPRAZOLE SODIUM 40 MG: 40 TABLET, DELAYED RELEASE ORAL at 05:12

## 2018-12-08 RX ADMIN — SEVELAMER CARBONATE 1600 MG: 800 TABLET, FILM COATED ORAL at 12:12

## 2018-12-08 RX ADMIN — SEVELAMER CARBONATE 1600 MG: 800 TABLET, FILM COATED ORAL at 08:12

## 2018-12-08 NOTE — PLAN OF CARE
Problem: Patient Care Overview  Goal: Plan of Care Review  Outcome: Ongoing (interventions implemented as appropriate)  Pt VSS. Pt remains AAOx4 throughout shift. Pt remains free from falls or other injuries throughout shift. Bed locked in lowest position, call light within reach, side rails up x 3, will continue to monitor. Unable to obtain sputum sample throughout shift. Dialysis nurse at bedside to perform HD.

## 2018-12-08 NOTE — ASSESSMENT & PLAN NOTE
- TB Gold + in 2015, repeat 2017 negative  - repeat testing ordered, 2 / 3 sputum samples collected  - airborne precautions in place   - Chest CT ordered

## 2018-12-08 NOTE — PROGRESS NOTES
"[facility] Hospital Medicine  Progress Note    Patient Name: Vaughn Retana  MRN: [id number]  Patient Class: IP- Inpatient   Admission Date: 12/3/2018  Length of Stay: 4 days  Attending Physician: Lainey Wheta MD  Primary Care Provider: Yvette Zelaya NP    [facility] Medicine Team: [facility] HOSP MED E Martell Rodriguez PA-C    Subjective:     Principal Problem:Hemoptysis    HPI:  A 53 year old black male with history of GI bleed, uncontrolled hypertension, CVA, chronic diastolic heart failure, and end stage renal disease on dialysis (Monday, Wednesday, Friday) presents to the emergency department with complaints of "coughing up blood" for 1 day. Patient states that he began spitting up a brown-red tinged fluid/mucus Monday when he was eating. He does admit this happening to him before. Patient denies any chest pain, shortness of breath, nausea, or abdominal pain. He states that has not noticed any bloody or dark colored stools. He states that he has not been taking any of his home medications. He was able to complete dialysis yesterday without any issues. He reports no aggravating or alleviating factors. He has tried no medications at home.  He denies chest pain, SOB, dizziness, palpitations, fever/chills, N/V/D, abdominal pain.    Hospital Course:  Vaughn Retana was admitted for hemoptysis, with repeated episodes since admission. He remains HDS. As patient with history of TB, TB work-up initiated, will follow. CT chest with multiple ground-glass nodules throughout the left upper and lower lobes concerning for an infectious or inflammatory process and fluid within the mid and distal esophagus. Anticipate discharge to nursing home pending TB clearance.     Interval History: AFB collected x 2 need 1 more    Review of Systems   Constitutional: Negative for activity change, appetite change, chills, diaphoresis, fatigue, fever and unexpected weight change.   HENT: Negative for congestion, " rhinorrhea, sore throat, trouble swallowing and voice change.    Eyes: Negative for visual disturbance.   Respiratory: Positive for cough (hemoptysis). Negative for choking, chest tightness, shortness of breath and wheezing.    Cardiovascular: Negative for chest pain, palpitations and leg swelling.   Gastrointestinal: Positive for vomiting (spitting up brown-red fluid/mucus). Negative for abdominal distention, abdominal pain, anal bleeding, blood in stool, constipation, diarrhea and nausea.   Endocrine: Negative for cold intolerance, heat intolerance, polydipsia and polyuria.   Genitourinary: Negative for dysuria, flank pain, frequency, hematuria and urgency.   Musculoskeletal: Negative for arthralgias, back pain, joint swelling and myalgias.   Skin: Negative for color change and rash.   Neurological: Negative for dizziness, seizures, syncope, facial asymmetry, speech difficulty, weakness, light-headedness, numbness and headaches.   Hematological: Negative for adenopathy. Does not bruise/bleed easily.   Psychiatric/Behavioral: Negative for agitation, confusion, hallucinations and suicidal ideas.     Objective:     Vital Signs (Most Recent):  Temp: 97.9 °F (36.6 °C) (12/08/18 1209)  Pulse: 71 (12/08/18 1209)  Resp: 20 (12/08/18 1209)  BP: 136/83 (12/08/18 1209)  SpO2: 100 % (12/08/18 1209) Vital Signs (24h Range):  Temp:  [96.4 °F (35.8 °C)-97.9 °F (36.6 °C)] 97.9 °F (36.6 °C)  Pulse:  [] 71  Resp:  [16-20] 20  SpO2:  [98 %-100 %] 100 %  BP: ()/(48-83) 136/83     Weight: 55.3 kg (122 lb)  Body mass index is 19.69 kg/m².    Intake/Output Summary (Last 24 hours) at 12/8/2018 1500  Last data filed at 12/8/2018 0645  Gross per 24 hour   Intake 1940 ml   Output 1500 ml   Net 440 ml      Physical Exam   Constitutional: He is oriented to person, place, and time. He appears well-developed. No distress.   HENT:   Head: Normocephalic and atraumatic.   Eyes: Pupils are equal, round, and reactive to light.   Neck:  Neck supple. Carotid bruit is not present. No thyromegaly present.   Cardiovascular: Normal rate and regular rhythm. Exam reveals no gallop.   No murmur heard.  Pulmonary/Chest: Effort normal. No respiratory distress. He has no wheezes.   Coarse breath sounds in lower lobes, bilaterally    Abdominal: Bowel sounds are normal. He exhibits no distension. There is no splenomegaly or hepatomegaly. There is no tenderness. There is no guarding.   Musculoskeletal: Normal range of motion. He exhibits no edema.   L leg BKA   Neurological: He is alert and oriented to person, place, and time. No cranial nerve deficit or sensory deficit.   Skin: Skin is warm and dry. No rash noted.   Psychiatric: He has a normal mood and affect. His behavior is normal.   Nursing note and vitals reviewed.      Significant Labs: All pertinent labs within the past 24 hours have been reviewed.    Significant Imaging: I have reviewed all pertinent imaging results/findings within the past 24 hours.    Assessment/Plan:      * Hemoptysis    CT chest with ground glass nodules in left lobe and fluid within the mid and distal esophagus --- predisposing the patient to microaspiration   - outpatient EGD and gastric emptying studies ordered however pt has yet to call to schedule--- encourage pt to have completed ASAP  - TB labs ordered--- denies associated symptoms at this time however has a previous history of incarceration per staff MD  - Nurse reports an episode of pink-tinged sputum 12/5   -HDS  -Protonix 40 mg PO BID  -EGD in May showed small hiatal hernia, LA Grade D reflux esophagitis, normal stomach, and normal examined duodenum.  -Patient has not had repeat EGD that was recommended in 3 months-- will have him to schedule it prior to discharge     History of TB (tuberculosis)    - TB Gold + in 2015, repeat 2017 negative  - repeat testing ordered, 2 / 3 sputum samples collected  - airborne precautions in place   - Chest CT ordered     Noncompliance     -Patient not taking home medication.  Also not compliant with follow-up  -Counseled on taking home medication  -Restart home medication       Hypertensive urgency    Renovascular hypertension  Improved   -SBP as high as 212/102 in ER.  Will give hydralazine 10 mg IV  -Patient noncompliant with medication regimen.  -Restart Norvasc 10 mg daily, Coreg 25 mg BID, hydralazine 50mg BID.     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, without long-term current use of insulin    -Last Hbg A1C 4.5 10/2018  -Repeat Hgb A1C       ESRD on hemodialysis    -Chronic kidney disease-mineral and bone disorder  -Secondary hyperparathyroidism of renal origin    -Consult nephrology to keep patient on dialysis schedule for Monday, Wednesday, Friday  -Continue Renvela 1600mg BID WM  - Patient still makes some urine  - HD completed today     Dyslipidemia    -Restart Lipitor 80 mg daily       Anemia in chronic kidney disease    Chronic and stable        VTE Risk Mitigation (From admission, onward)        Ordered     IP VTE HIGH RISK PATIENT  Once      12/04/18 0220     Place sequential compression device  Until discontinued      12/04/18 0220     Place MEL hose  Until discontinued      12/04/18 0220              Martell Rodriguez PA-C  Department of Hospital Medicine   [facility]

## 2018-12-08 NOTE — SUBJECTIVE & OBJECTIVE
Interval History: AFB collected x 2 need 1 more    Review of Systems   Constitutional: Negative for activity change, appetite change, chills, diaphoresis, fatigue, fever and unexpected weight change.   HENT: Negative for congestion, rhinorrhea, sore throat, trouble swallowing and voice change.    Eyes: Negative for visual disturbance.   Respiratory: Positive for cough (hemoptysis). Negative for choking, chest tightness, shortness of breath and wheezing.    Cardiovascular: Negative for chest pain, palpitations and leg swelling.   Gastrointestinal: Positive for vomiting (spitting up brown-red fluid/mucus). Negative for abdominal distention, abdominal pain, anal bleeding, blood in stool, constipation, diarrhea and nausea.   Endocrine: Negative for cold intolerance, heat intolerance, polydipsia and polyuria.   Genitourinary: Negative for dysuria, flank pain, frequency, hematuria and urgency.   Musculoskeletal: Negative for arthralgias, back pain, joint swelling and myalgias.   Skin: Negative for color change and rash.   Neurological: Negative for dizziness, seizures, syncope, facial asymmetry, speech difficulty, weakness, light-headedness, numbness and headaches.   Hematological: Negative for adenopathy. Does not bruise/bleed easily.   Psychiatric/Behavioral: Negative for agitation, confusion, hallucinations and suicidal ideas.     Objective:     Vital Signs (Most Recent):  Temp: 97.9 °F (36.6 °C) (12/08/18 1209)  Pulse: 71 (12/08/18 1209)  Resp: 20 (12/08/18 1209)  BP: 136/83 (12/08/18 1209)  SpO2: 100 % (12/08/18 1209) Vital Signs (24h Range):  Temp:  [96.4 °F (35.8 °C)-97.9 °F (36.6 °C)] 97.9 °F (36.6 °C)  Pulse:  [] 71  Resp:  [16-20] 20  SpO2:  [98 %-100 %] 100 %  BP: ()/(48-83) 136/83     Weight: 55.3 kg (122 lb)  Body mass index is 19.69 kg/m².    Intake/Output Summary (Last 24 hours) at 12/8/2018 1500  Last data filed at 12/8/2018 0645  Gross per 24 hour   Intake 1940 ml   Output 1500 ml   Net 440 ml       Physical Exam   Constitutional: He is oriented to person, place, and time. He appears well-developed. No distress.   HENT:   Head: Normocephalic and atraumatic.   Eyes: Pupils are equal, round, and reactive to light.   Neck: Neck supple. Carotid bruit is not present. No thyromegaly present.   Cardiovascular: Normal rate and regular rhythm. Exam reveals no gallop.   No murmur heard.  Pulmonary/Chest: Effort normal. No respiratory distress. He has no wheezes.   Coarse breath sounds in lower lobes, bilaterally    Abdominal: Bowel sounds are normal. He exhibits no distension. There is no splenomegaly or hepatomegaly. There is no tenderness. There is no guarding.   Musculoskeletal: Normal range of motion. He exhibits no edema.   L leg BKA   Neurological: He is alert and oriented to person, place, and time. No cranial nerve deficit or sensory deficit.   Skin: Skin is warm and dry. No rash noted.   Psychiatric: He has a normal mood and affect. His behavior is normal.   Nursing note and vitals reviewed.      Significant Labs: All pertinent labs within the past 24 hours have been reviewed.    Significant Imaging: I have reviewed all pertinent imaging results/findings within the past 24 hours.

## 2018-12-08 NOTE — PLAN OF CARE
Problem: Patient Care Overview  Goal: Plan of Care Review  Outcome: Ongoing (interventions implemented as appropriate)  Pt. offered no c/o for this shift.   TB precaution maintained. Pt. not coughing-unable to collect sample.   VSS. No major skin issue noted.   Pt. had a HD this morning.   Safety and pt. care rounds maintained. Wctm.

## 2018-12-08 NOTE — PLAN OF CARE
Problem: Patient Care Overview  Goal: Plan of Care Review  Outcome: Ongoing (interventions implemented as appropriate)    12/07/18 0215   Coping/Psychosocial   Plan Of Care Reviewed With patient      Pt free of falls/trauma/injuries. Bed in low position, wheels locked, and call light within reach. Skin integrity remains unchanged. CBG monitored as ordered. AFB cultures x2 to be collected, encouraged pt to provided sputum samples and specimen cups left at bedside. VSS and afebrile.No distress noted/reported at this time. Will continue to monitor.

## 2018-12-08 NOTE — PROGRESS NOTES
3-hr dialysis tx completed with net UF of 2L. Tolerated well. No acute events. Rinsed back blood. Pulled out needles. Pressure applied and hemostasis achieved. Gauzed and taped. Positive thrill and bruit.

## 2018-12-09 LAB
ALBUMIN SERPL BCP-MCNC: 2.8 G/DL
ALP SERPL-CCNC: 81 U/L
ALT SERPL W/O P-5'-P-CCNC: 10 U/L
ANION GAP SERPL CALC-SCNC: 10 MMOL/L
AST SERPL-CCNC: 14 U/L
BASOPHILS # BLD AUTO: 0.02 K/UL
BASOPHILS NFR BLD: 0.4 %
BILIRUB SERPL-MCNC: 0.3 MG/DL
BUN SERPL-MCNC: 37 MG/DL
CALCIUM SERPL-MCNC: 9.8 MG/DL
CHLORIDE SERPL-SCNC: 97 MMOL/L
CO2 SERPL-SCNC: 26 MMOL/L
CREAT SERPL-MCNC: 7.4 MG/DL
DIFFERENTIAL METHOD: ABNORMAL
EOSINOPHIL # BLD AUTO: 0.5 K/UL
EOSINOPHIL NFR BLD: 9.1 %
ERYTHROCYTE [DISTWIDTH] IN BLOOD BY AUTOMATED COUNT: 13.6 %
EST. GFR  (AFRICAN AMERICAN): 8.7 ML/MIN/1.73 M^2
EST. GFR  (NON AFRICAN AMERICAN): 7.6 ML/MIN/1.73 M^2
GLUCOSE SERPL-MCNC: 205 MG/DL
HCT VFR BLD AUTO: 37 %
HGB BLD-MCNC: 12.1 G/DL
IMM GRANULOCYTES # BLD AUTO: 0.02 K/UL
IMM GRANULOCYTES NFR BLD AUTO: 0.4 %
LYMPHOCYTES # BLD AUTO: 1.1 K/UL
LYMPHOCYTES NFR BLD: 20.4 %
M TB CMPLX DNA SPEC QL NAA+PROBE: NEGATIVE
MAGNESIUM SERPL-MCNC: 2.3 MG/DL
MCH RBC QN AUTO: 30.6 PG
MCHC RBC AUTO-ENTMCNC: 32.7 G/DL
MCV RBC AUTO: 94 FL
MONOCYTES # BLD AUTO: 0.6 K/UL
MONOCYTES NFR BLD: 12.4 %
NEUTROPHILS # BLD AUTO: 3 K/UL
NEUTROPHILS NFR BLD: 57.3 %
NRBC BLD-RTO: 0 /100 WBC
PHOSPHATE SERPL-MCNC: 3.1 MG/DL
PLATELET # BLD AUTO: 166 K/UL
PMV BLD AUTO: 11.7 FL
POCT GLUCOSE: 136 MG/DL (ref 70–110)
POCT GLUCOSE: 93 MG/DL (ref 70–110)
POTASSIUM SERPL-SCNC: 4.5 MMOL/L
PROT SERPL-MCNC: 7.4 G/DL
RBC # BLD AUTO: 3.95 M/UL
SODIUM SERPL-SCNC: 133 MMOL/L
SPECIMEN SOURCE: NORMAL
WBC # BLD AUTO: 5.15 K/UL

## 2018-12-09 PROCEDURE — 25000003 PHARM REV CODE 250: Performed by: PHYSICIAN ASSISTANT

## 2018-12-09 PROCEDURE — 99233 SBSQ HOSP IP/OBS HIGH 50: CPT | Mod: ,,, | Performed by: PHYSICIAN ASSISTANT

## 2018-12-09 PROCEDURE — 94640 AIRWAY INHALATION TREATMENT: CPT

## 2018-12-09 PROCEDURE — 87015 SPECIMEN INFECT AGNT CONCNTJ: CPT

## 2018-12-09 PROCEDURE — 85025 COMPLETE CBC W/AUTO DIFF WBC: CPT

## 2018-12-09 PROCEDURE — 80053 COMPREHEN METABOLIC PANEL: CPT

## 2018-12-09 PROCEDURE — 84100 ASSAY OF PHOSPHORUS: CPT

## 2018-12-09 PROCEDURE — 36415 COLL VENOUS BLD VENIPUNCTURE: CPT

## 2018-12-09 PROCEDURE — 11000001 HC ACUTE MED/SURG PRIVATE ROOM

## 2018-12-09 PROCEDURE — 94761 N-INVAS EAR/PLS OXIMETRY MLT: CPT

## 2018-12-09 PROCEDURE — 87116 MYCOBACTERIA CULTURE: CPT

## 2018-12-09 PROCEDURE — 87206 SMEAR FLUORESCENT/ACID STAI: CPT

## 2018-12-09 PROCEDURE — 25000242 PHARM REV CODE 250 ALT 637 W/ HCPCS: Performed by: PHYSICIAN ASSISTANT

## 2018-12-09 PROCEDURE — 83735 ASSAY OF MAGNESIUM: CPT

## 2018-12-09 RX ORDER — SODIUM CHLORIDE FOR INHALATION 3 %
4 VIAL, NEBULIZER (ML) INHALATION ONCE
Status: COMPLETED | OUTPATIENT
Start: 2018-12-09 | End: 2018-12-09

## 2018-12-09 RX ADMIN — SEVELAMER CARBONATE 1600 MG: 800 TABLET, FILM COATED ORAL at 09:12

## 2018-12-09 RX ADMIN — AMLODIPINE BESYLATE 10 MG: 10 TABLET ORAL at 09:12

## 2018-12-09 RX ADMIN — CARVEDILOL 25 MG: 25 TABLET, FILM COATED ORAL at 05:12

## 2018-12-09 RX ADMIN — HYDRALAZINE HYDROCHLORIDE 50 MG: 50 TABLET ORAL at 09:12

## 2018-12-09 RX ADMIN — PANTOPRAZOLE SODIUM 40 MG: 40 TABLET, DELAYED RELEASE ORAL at 05:12

## 2018-12-09 RX ADMIN — SEVELAMER CARBONATE 1600 MG: 800 TABLET, FILM COATED ORAL at 05:12

## 2018-12-09 RX ADMIN — CARVEDILOL 25 MG: 25 TABLET, FILM COATED ORAL at 09:12

## 2018-12-09 RX ADMIN — SODIUM CHLORIDE SOLN NEBU 3% 4 ML: 3 NEBU SOLN at 12:12

## 2018-12-09 RX ADMIN — SEVELAMER CARBONATE 1600 MG: 800 TABLET, FILM COATED ORAL at 12:12

## 2018-12-09 RX ADMIN — HYDRALAZINE HYDROCHLORIDE 50 MG: 50 TABLET ORAL at 08:12

## 2018-12-09 RX ADMIN — ATORVASTATIN CALCIUM 80 MG: 20 TABLET, FILM COATED ORAL at 08:12

## 2018-12-09 NOTE — PLAN OF CARE
Problem: Patient Care Overview  Goal: Plan of Care Review  Outcome: Ongoing (interventions implemented as appropriate)  Pt VSS. Pt remains AAOx4 throughout shift. Pt remains free from falls or other injuries throughout shift. Bed locked in lowest position, call light within reach, side rails up x 3, will continue to monitor.

## 2018-12-10 LAB
ALBUMIN SERPL BCP-MCNC: 2.6 G/DL
ALP SERPL-CCNC: 77 U/L
ALT SERPL W/O P-5'-P-CCNC: 10 U/L
ANION GAP SERPL CALC-SCNC: 10 MMOL/L
AST SERPL-CCNC: 14 U/L
BASOPHILS # BLD AUTO: 0.03 K/UL
BASOPHILS NFR BLD: 0.6 %
BILIRUB SERPL-MCNC: 0.3 MG/DL
BUN SERPL-MCNC: 52 MG/DL
CALCIUM SERPL-MCNC: 10.2 MG/DL
CHLORIDE SERPL-SCNC: 95 MMOL/L
CO2 SERPL-SCNC: 26 MMOL/L
CREAT SERPL-MCNC: 9.4 MG/DL
DIFFERENTIAL METHOD: ABNORMAL
EOSINOPHIL # BLD AUTO: 0.5 K/UL
EOSINOPHIL NFR BLD: 9.3 %
ERYTHROCYTE [DISTWIDTH] IN BLOOD BY AUTOMATED COUNT: 13.9 %
EST. GFR  (AFRICAN AMERICAN): 6.5 ML/MIN/1.73 M^2
EST. GFR  (NON AFRICAN AMERICAN): 5.7 ML/MIN/1.73 M^2
GLUCOSE SERPL-MCNC: 139 MG/DL
HCT VFR BLD AUTO: 36.9 %
HGB BLD-MCNC: 12 G/DL
IMM GRANULOCYTES # BLD AUTO: 0.01 K/UL
IMM GRANULOCYTES NFR BLD AUTO: 0.2 %
LYMPHOCYTES # BLD AUTO: 1.3 K/UL
LYMPHOCYTES NFR BLD: 24.4 %
MAGNESIUM SERPL-MCNC: 2.2 MG/DL
MCH RBC QN AUTO: 31.7 PG
MCHC RBC AUTO-ENTMCNC: 32.5 G/DL
MCV RBC AUTO: 98 FL
MONOCYTES # BLD AUTO: 0.7 K/UL
MONOCYTES NFR BLD: 13.8 %
NEUTROPHILS # BLD AUTO: 2.7 K/UL
NEUTROPHILS NFR BLD: 51.7 %
NRBC BLD-RTO: 0 /100 WBC
PHOSPHATE SERPL-MCNC: 2.3 MG/DL
PLATELET # BLD AUTO: 160 K/UL
PMV BLD AUTO: 11.3 FL
POCT GLUCOSE: 131 MG/DL (ref 70–110)
POCT GLUCOSE: 135 MG/DL (ref 70–110)
POCT GLUCOSE: 169 MG/DL (ref 70–110)
POCT GLUCOSE: 231 MG/DL (ref 70–110)
POTASSIUM SERPL-SCNC: 5 MMOL/L
PROT SERPL-MCNC: 7 G/DL
RBC # BLD AUTO: 3.78 M/UL
SODIUM SERPL-SCNC: 131 MMOL/L
WBC # BLD AUTO: 5.29 K/UL

## 2018-12-10 PROCEDURE — 80053 COMPREHEN METABOLIC PANEL: CPT

## 2018-12-10 PROCEDURE — 99232 SBSQ HOSP IP/OBS MODERATE 35: CPT | Mod: ,,, | Performed by: PHYSICIAN ASSISTANT

## 2018-12-10 PROCEDURE — 84100 ASSAY OF PHOSPHORUS: CPT

## 2018-12-10 PROCEDURE — 11000001 HC ACUTE MED/SURG PRIVATE ROOM

## 2018-12-10 PROCEDURE — 25000003 PHARM REV CODE 250: Performed by: PHYSICIAN ASSISTANT

## 2018-12-10 PROCEDURE — 90935 HEMODIALYSIS ONE EVALUATION: CPT | Mod: ,,, | Performed by: INTERNAL MEDICINE

## 2018-12-10 PROCEDURE — 85025 COMPLETE CBC W/AUTO DIFF WBC: CPT

## 2018-12-10 PROCEDURE — 36415 COLL VENOUS BLD VENIPUNCTURE: CPT

## 2018-12-10 PROCEDURE — 25000003 PHARM REV CODE 250: Performed by: INTERNAL MEDICINE

## 2018-12-10 PROCEDURE — 63600175 PHARM REV CODE 636 W HCPCS: Performed by: PHYSICIAN ASSISTANT

## 2018-12-10 PROCEDURE — 80100014 HC HEMODIALYSIS 1:1

## 2018-12-10 PROCEDURE — 83735 ASSAY OF MAGNESIUM: CPT

## 2018-12-10 RX ORDER — SODIUM CHLORIDE 9 MG/ML
INJECTION, SOLUTION INTRAVENOUS
Status: DISCONTINUED | OUTPATIENT
Start: 2018-12-10 | End: 2018-12-11 | Stop reason: HOSPADM

## 2018-12-10 RX ORDER — SODIUM CHLORIDE 9 MG/ML
INJECTION, SOLUTION INTRAVENOUS ONCE
Status: COMPLETED | OUTPATIENT
Start: 2018-12-10 | End: 2018-12-10

## 2018-12-10 RX ADMIN — SEVELAMER CARBONATE 1600 MG: 800 TABLET, FILM COATED ORAL at 08:12

## 2018-12-10 RX ADMIN — STANDARDIZED SENNA CONCENTRATE AND DOCUSATE SODIUM 1 TABLET: 8.6; 5 TABLET, FILM COATED ORAL at 09:12

## 2018-12-10 RX ADMIN — CARVEDILOL 25 MG: 25 TABLET, FILM COATED ORAL at 08:12

## 2018-12-10 RX ADMIN — CARVEDILOL 25 MG: 25 TABLET, FILM COATED ORAL at 06:12

## 2018-12-10 RX ADMIN — HYDRALAZINE HYDROCHLORIDE 50 MG: 50 TABLET ORAL at 08:12

## 2018-12-10 RX ADMIN — SODIUM CHLORIDE 400 ML: 0.9 INJECTION, SOLUTION INTRAVENOUS at 03:12

## 2018-12-10 RX ADMIN — PANTOPRAZOLE SODIUM 40 MG: 40 TABLET, DELAYED RELEASE ORAL at 06:12

## 2018-12-10 RX ADMIN — INSULIN ASPART 2 UNITS: 100 INJECTION, SOLUTION INTRAVENOUS; SUBCUTANEOUS at 12:12

## 2018-12-10 RX ADMIN — ATORVASTATIN CALCIUM 80 MG: 20 TABLET, FILM COATED ORAL at 09:12

## 2018-12-10 RX ADMIN — PANTOPRAZOLE SODIUM 40 MG: 40 TABLET, DELAYED RELEASE ORAL at 05:12

## 2018-12-10 RX ADMIN — SEVELAMER CARBONATE 1600 MG: 800 TABLET, FILM COATED ORAL at 12:12

## 2018-12-10 RX ADMIN — HYDRALAZINE HYDROCHLORIDE 50 MG: 50 TABLET ORAL at 09:12

## 2018-12-10 RX ADMIN — SEVELAMER CARBONATE 1600 MG: 800 TABLET, FILM COATED ORAL at 06:12

## 2018-12-10 RX ADMIN — AMLODIPINE BESYLATE 10 MG: 10 TABLET ORAL at 08:12

## 2018-12-10 NOTE — PLAN OF CARE
Problem: Patient Care Overview  Goal: Plan of Care Review  Outcome: Ongoing (interventions implemented as appropriate)  Pt VSS. Pt remains AAOx4 throughout shift. Pt remains free from falls or other injuries throughout shift. Bed locked in lowest position, call light within reach, side rails up x 3, will continue to monitor. Accuchecks Q 6 hrs WDL.

## 2018-12-10 NOTE — PROGRESS NOTES
"[facility] Hospital Medicine  Progress Note    Patient Name: Vaughn Retana  MRN: [id number]  Patient Class: IP- Inpatient   Admission Date: 12/3/2018  Length of Stay: 5 days  Attending Physician: Lainey Wheat MD  Primary Care Provider: Yvette Zelaya NP    [facility] Medicine Team: [facility] MED E Martell Rodriguez PA-C    Subjective:     Principal Problem:Hemoptysis    HPI:  A 53 year old black male with history of GI bleed, uncontrolled hypertension, CVA, chronic diastolic heart failure, and end stage renal disease on dialysis (Monday, Wednesday, Friday) presents to the emergency department with complaints of "coughing up blood" for 1 day. Patient states that he began spitting up a brown-red tinged fluid/mucus Monday when he was eating. He does admit this happening to him before. Patient denies any chest pain, shortness of breath, nausea, or abdominal pain. He states that has not noticed any bloody or dark colored stools. He states that he has not been taking any of his home medications. He was able to complete dialysis yesterday without any issues. He reports no aggravating or alleviating factors. He has tried no medications at home.  He denies chest pain, SOB, dizziness, palpitations, fever/chills, N/V/D, abdominal pain.    Hospital Course:  Vaughn Retana was admitted for hemoptysis, with repeated episodes since admission. He remains HDS. As patient with history of TB, TB work-up initiated, will follow. CT chest with multiple ground-glass nodules throughout the left upper and lower lobes concerning for an infectious or inflammatory process and fluid within the mid and distal esophagus. Anticipate discharge to nursing home pending TB clearance.     Interval History: AFB collected x 3. No complaints today. Plan of care reviewed with patient.    Review of Systems   Constitutional: Negative for activity change, appetite change, chills, diaphoresis, fatigue, fever and unexpected " weight change.   HENT: Negative for congestion, rhinorrhea, sore throat, trouble swallowing and voice change.    Eyes: Negative for visual disturbance.   Respiratory: Positive for cough (hemoptysis). Negative for choking, chest tightness, shortness of breath and wheezing.    Cardiovascular: Negative for chest pain, palpitations and leg swelling.   Gastrointestinal: Positive for vomiting (spitting up brown-red fluid/mucus). Negative for abdominal distention, abdominal pain, anal bleeding, blood in stool, constipation, diarrhea and nausea.   Endocrine: Negative for cold intolerance, heat intolerance, polydipsia and polyuria.   Genitourinary: Negative for dysuria, flank pain, frequency, hematuria and urgency.   Musculoskeletal: Negative for arthralgias, back pain, joint swelling and myalgias.   Skin: Negative for color change and rash.   Neurological: Negative for dizziness, seizures, syncope, facial asymmetry, speech difficulty, weakness, light-headedness, numbness and headaches.   Hematological: Negative for adenopathy. Does not bruise/bleed easily.   Psychiatric/Behavioral: Negative for agitation, confusion, hallucinations and suicidal ideas.     Objective:     Vital Signs (Most Recent):  Temp: 98.2 °F (36.8 °C) (12/09/18 1700)  Pulse: 80 (12/09/18 1900)  Resp: 18 (12/09/18 1700)  BP: 138/75 (12/09/18 1700)  SpO2: 100 % (12/09/18 1700) Vital Signs (24h Range):  Temp:  [97.8 °F (36.6 °C)-98.2 °F (36.8 °C)] 98.2 °F (36.8 °C)  Pulse:  [69-80] 80  Resp:  [14-18] 18  SpO2:  [98 %-100 %] 100 %  BP: ()/(57-80) 138/75     Weight: 55.5 kg (122 lb 5.7 oz)  Body mass index is 19.75 kg/m².    Intake/Output Summary (Last 24 hours) at 12/9/2018 2031  Last data filed at 12/9/2018 1800  Gross per 24 hour   Intake 1320 ml   Output 140 ml   Net 1180 ml      Physical Exam   Constitutional: He is oriented to person, place, and time. He appears well-developed. No distress.   HENT:   Head: Normocephalic and atraumatic.   Eyes:  Pupils are equal, round, and reactive to light.   Neck: Neck supple. Carotid bruit is not present. No thyromegaly present.   Cardiovascular: Normal rate and regular rhythm. Exam reveals no gallop.   No murmur heard.  Pulmonary/Chest: Effort normal. No respiratory distress. He has no wheezes.   Coarse breath sounds in lower lobes, bilaterally    Abdominal: Bowel sounds are normal. He exhibits no distension. There is no splenomegaly or hepatomegaly. There is no tenderness. There is no guarding.   Musculoskeletal: Normal range of motion. He exhibits no edema.   L leg BKA   Neurological: He is alert and oriented to person, place, and time. No cranial nerve deficit or sensory deficit.   Skin: Skin is warm and dry. No rash noted.   Psychiatric: He has a normal mood and affect. His behavior is normal.   Nursing note and vitals reviewed.      Significant Labs: All pertinent labs within the past 24 hours have been reviewed.    Significant Imaging: I have reviewed all pertinent imaging results/findings within the past 24 hours.    Assessment/Plan:      * Hemoptysis    CT chest with ground glass nodules in left lobe and fluid within the mid and distal esophagus --- predisposing the patient to microaspiration   - outpatient EGD and gastric emptying studies ordered however pt has yet to call to schedule--- encourage pt to have completed ASAP  - TB labs ordered--- denies associated symptoms at this time however has a previous history of incarceration per staff MD  - Nurse reports an episode of pink-tinged sputum 12/5   -Protonix 40 mg PO BID  -EGD in May showed small hiatal hernia, LA Grade D reflux esophagitis, normal stomach, and normal examined duodenum.  -Patient has not had repeat EGD that was recommended in 3 months-- will have him to schedule it prior to discharge     History of TB (tuberculosis)    - TB Gold + in 2015, repeat 2017 negative  - repeat testing ordered, 3 / 3 sputum samples collected, all negative for AFB  -  airborne precautions in place   - Chest CT with multiple ground glass opacities     Noncompliance    -Patient not taking home medication.  Also not compliant with follow-up  -Counseled on taking home medication  -Restart home medication       Hypertensive urgency    Renovascular hypertension  Improved   -SBP as high as 212/102 in ER.  Will give hydralazine 10 mg IV  -Patient noncompliant with medication regimen.  -Restart Norvasc 10 mg daily, Coreg 25 mg BID, hydralazine 50mg BID.     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, without long-term current use of insulin    -Last Hbg A1C 4.5 10/2018       ESRD on hemodialysis    -Chronic kidney disease-mineral and bone disorder  -Secondary hyperparathyroidism of renal origin    -Consult nephrology to keep patient on dialysis schedule for Monday, Wednesday, Friday  -Continue Renvela 1600mg BID WM  - Patient still makes some urine  - HD completed Friday     Dyslipidemia    -Restart Lipitor 80 mg daily       Anemia in chronic kidney disease    Chronic and stable        VTE Risk Mitigation (From admission, onward)        Ordered     IP VTE HIGH RISK PATIENT  Once      12/04/18 0220     Place sequential compression device  Until discontinued      12/04/18 0220     Place MEL hose  Until discontinued      12/04/18 0220              Martell Rodriguez PA-C  Department of Hospital Medicine   [facility]

## 2018-12-10 NOTE — ASSESSMENT & PLAN NOTE
- TB Gold + in 2015, repeat 2017 negative  - repeat testing ordered, 3 / 3 sputum samples collected, all negative for AFB  - airborne precautions in place   - Chest CT with multiple ground glass opacities

## 2018-12-10 NOTE — SUBJECTIVE & OBJECTIVE
Interval History: AFB collected x 3. No TB. Airborne precautions discontinued. Discharge tomorrow.     Review of Systems   Constitutional: Negative for chills, diaphoresis, fever and unexpected weight change.   Respiratory: Positive for cough (hemoptysis). Negative for chest tightness.    Cardiovascular: Negative for chest pain and palpitations.   Gastrointestinal: Positive for vomiting (spitting up brown-red fluid/mucus). Negative for abdominal pain.   Neurological: Negative for syncope and weakness.   Psychiatric/Behavioral: Negative for agitation and confusion.     Objective:     Vital Signs (Most Recent):  Temp: 97.6 °F (36.4 °C) (12/10/18 1500)  Pulse: 67 (12/10/18 1500)  Resp: 16 (12/10/18 1107)  BP: (!) 110/53 (12/10/18 1107)  SpO2: 99 % (12/10/18 1107) Vital Signs (24h Range):  Temp:  [97.5 °F (36.4 °C)-99.1 °F (37.3 °C)] 97.6 °F (36.4 °C)  Pulse:  [67-80] 67  Resp:  [12-18] 16  SpO2:  [98 %-100 %] 99 %  BP: (110-147)/(53-81) 110/53     Weight: 55.2 kg (121 lb 11.1 oz)  Body mass index is 19.64 kg/m².    Intake/Output Summary (Last 24 hours) at 12/10/2018 1628  Last data filed at 12/10/2018 0500  Gross per 24 hour   Intake 1440 ml   Output 140 ml   Net 1300 ml      Physical Exam   Constitutional: He is oriented to person, place, and time. He appears well-developed. No distress.   Neck: Carotid bruit is not present.   Cardiovascular: Normal rate and regular rhythm.   No murmur heard.  Pulmonary/Chest: Effort normal. No respiratory distress. He has no wheezes.   Coarse breath sounds in lower lobes, bilaterally    Abdominal: Bowel sounds are normal. There is no splenomegaly or hepatomegaly. There is no tenderness.   Musculoskeletal: He exhibits no edema or tenderness.   L leg BKA   Neurological: He is alert and oriented to person, place, and time. No cranial nerve deficit.   Skin: Skin is warm and dry.   Psychiatric: He has a normal mood and affect. His behavior is normal.   Nursing note and vitals  reviewed.      Significant Labs: All pertinent labs within the past 24 hours have been reviewed.    Significant Imaging: I have reviewed all pertinent imaging results/findings within the past 24 hours.

## 2018-12-10 NOTE — PLAN OF CARE
12/10/18 1250   Discharge Reassessment   Assessment Type Discharge Planning Reassessment   Do you have any problems affording any of your prescribed medications? No   Discharge Plan A Skilled Nursing Facility   Discharge Plan B New Nursing Home placement - skilled nursing care facility   Anticipated Discharge Disposition SNF   Can the patient answer the patient profile reliably? Yes, cognitively intact   How does the patient rate their overall health at the present time? Fair   Describe the patient's ability to walk at the present time. Major restrictions/daily assistance from another person   How often would a person be available to care for the patient? Occasionally   Number of comorbid conditions (as recorded on the chart) Five or more     Patient resting quietly in bed when CM rounded. No family present. Patient in agreement with plan to discharge to SNF with future transition to NH skilled nursing care. Awaiting result from sputum sample collected to test for AFB. Will continue to follow.

## 2018-12-10 NOTE — PLAN OF CARE
Problem: Patient Care Overview  Goal: Plan of Care Review  Outcome: Ongoing (interventions implemented as appropriate)  Pt. Offered no c/o for this shift.   VSS. Pt. Not coughing up.   Sputum sample collected through induction by RT. It was send.   Safety and pt. care rounds maintained. Wctm.

## 2018-12-10 NOTE — PLAN OF CARE
12/10/18 1136   Post-Acute Status   Post-Acute Authorization Placement   Post-Acute Placement Status Pending Clinical Review     Pt will be medically stable this afternoon after his lab work results.  NATTY spoke with Jerad ([phone]) and he will be able to accept the pt once it is confirmed that the pt does not have tb.  He is meeting with the pt today.  NATTY sent updates to Jerad.  NATTY left a message for the pt's sister Alicia Retana ([phone])) to confirm that they want placement from the hospital and not from home.  NATTY will f/u in a few hours.    Daniela Snider, [facility] x [phone]

## 2018-12-10 NOTE — PLAN OF CARE
SW left several more messages for the pt's sister.  NATTY will f/u tomorrow.    Daniela Snider, [facility] x [phone]

## 2018-12-10 NOTE — ASSESSMENT & PLAN NOTE
-Chronic kidney disease-mineral and bone disorder  -Secondary hyperparathyroidism of renal origin    -Consult nephrology to keep patient on dialysis schedule for Monday, Wednesday, Friday  -Continue Renvela 1600mg BID WM  - Patient still makes some urine  - HD completed Friday

## 2018-12-10 NOTE — ASSESSMENT & PLAN NOTE
- TB Gold + in 2015, repeat 2017 negative  - repeat testing ordered, 3 / 3 sputum samples collected, all negative for AFB  - airborne precautions discontinued  - Chest CT with multiple ground glass opacities

## 2018-12-10 NOTE — ASSESSMENT & PLAN NOTE
-Chronic kidney disease-mineral and bone disorder  -Secondary hyperparathyroidism of renal origin    -Consult nephrology to keep patient on dialysis schedule for Monday, Wednesday, Friday  -Continue Renvela 1600mg BID WM  - Patient still makes some urine  - HD scheduled for today

## 2018-12-10 NOTE — ASSESSMENT & PLAN NOTE
CT chest with ground glass nodules in left lobe and fluid within the mid and distal esophagus --- predisposing the patient to microaspiration   - outpatient EGD and gastric emptying studies ordered however pt has yet to call to schedule--- encourage pt to have completed ASAP  - TB labs ordered--- denies associated symptoms at this time however has a previous history of incarceration per staff MD  - Nurse reports an episode of pink-tinged sputum 12/5   -Protonix 40 mg PO BID  -EGD in May showed small hiatal hernia, LA Grade D reflux esophagitis, normal stomach, and normal examined duodenum.  -Patient has not had repeat EGD that was recommended in 3 months-- will have him to schedule it prior to discharge

## 2018-12-10 NOTE — ASSESSMENT & PLAN NOTE
CT chest with ground glass nodules in left lobe and fluid within the mid and distal esophagus --- predisposing the patient to microaspiration   - outpatient EGD and gastric emptying studies ordered however pt has yet to call to schedule--- encourage pt to have completed ASAP  - TB labs ordered and NEGATIVE for infection, see history of TB   - Nurse reports an episode of pink-tinged sputum 12/5   -Protonix 40 mg PO BID  -EGD in May showed small hiatal hernia, LA Grade D reflux esophagitis, normal stomach, and normal examined duodenum.  -Patient has not had repeat EGD that was recommended in 3 months-- will have him to schedule it prior to discharge

## 2018-12-10 NOTE — SUBJECTIVE & OBJECTIVE
Interval History: AFB collected x 3. No complaints today. Plan of care reviewed with patient.    Review of Systems   Constitutional: Negative for activity change, appetite change, chills, diaphoresis, fatigue, fever and unexpected weight change.   HENT: Negative for congestion, rhinorrhea, sore throat, trouble swallowing and voice change.    Eyes: Negative for visual disturbance.   Respiratory: Positive for cough (hemoptysis). Negative for choking, chest tightness, shortness of breath and wheezing.    Cardiovascular: Negative for chest pain, palpitations and leg swelling.   Gastrointestinal: Positive for vomiting (spitting up brown-red fluid/mucus). Negative for abdominal distention, abdominal pain, anal bleeding, blood in stool, constipation, diarrhea and nausea.   Endocrine: Negative for cold intolerance, heat intolerance, polydipsia and polyuria.   Genitourinary: Negative for dysuria, flank pain, frequency, hematuria and urgency.   Musculoskeletal: Negative for arthralgias, back pain, joint swelling and myalgias.   Skin: Negative for color change and rash.   Neurological: Negative for dizziness, seizures, syncope, facial asymmetry, speech difficulty, weakness, light-headedness, numbness and headaches.   Hematological: Negative for adenopathy. Does not bruise/bleed easily.   Psychiatric/Behavioral: Negative for agitation, confusion, hallucinations and suicidal ideas.     Objective:     Vital Signs (Most Recent):  Temp: 98.2 °F (36.8 °C) (12/09/18 1700)  Pulse: 80 (12/09/18 1900)  Resp: 18 (12/09/18 1700)  BP: 138/75 (12/09/18 1700)  SpO2: 100 % (12/09/18 1700) Vital Signs (24h Range):  Temp:  [97.8 °F (36.6 °C)-98.2 °F (36.8 °C)] 98.2 °F (36.8 °C)  Pulse:  [69-80] 80  Resp:  [14-18] 18  SpO2:  [98 %-100 %] 100 %  BP: ()/(57-80) 138/75     Weight: 55.5 kg (122 lb 5.7 oz)  Body mass index is 19.75 kg/m².    Intake/Output Summary (Last 24 hours) at 12/9/2018 2031  Last data filed at 12/9/2018 1800  Gross per 24  hour   Intake 1320 ml   Output 140 ml   Net 1180 ml      Physical Exam   Constitutional: He is oriented to person, place, and time. He appears well-developed. No distress.   HENT:   Head: Normocephalic and atraumatic.   Eyes: Pupils are equal, round, and reactive to light.   Neck: Neck supple. Carotid bruit is not present. No thyromegaly present.   Cardiovascular: Normal rate and regular rhythm. Exam reveals no gallop.   No murmur heard.  Pulmonary/Chest: Effort normal. No respiratory distress. He has no wheezes.   Coarse breath sounds in lower lobes, bilaterally    Abdominal: Bowel sounds are normal. He exhibits no distension. There is no splenomegaly or hepatomegaly. There is no tenderness. There is no guarding.   Musculoskeletal: Normal range of motion. He exhibits no edema.   L leg BKA   Neurological: He is alert and oriented to person, place, and time. No cranial nerve deficit or sensory deficit.   Skin: Skin is warm and dry. No rash noted.   Psychiatric: He has a normal mood and affect. His behavior is normal.   Nursing note and vitals reviewed.      Significant Labs: All pertinent labs within the past 24 hours have been reviewed.    Significant Imaging: I have reviewed all pertinent imaging results/findings within the past 24 hours.

## 2018-12-10 NOTE — PROGRESS NOTES
"[facility] Hospital Medicine  Progress Note    Patient Name: Vaughn Retana  MRN: [id number]  Patient Class: IP- Inpatient   Admission Date: 12/3/2018  Length of Stay: 6 days  Attending Physician: Lainey Wheat MD  Primary Care Provider: Yvette Zelaya NP    [facility] Medicine Team: [facility] Martell Rodriguez PA-C    Subjective:     Principal Problem:Hemoptysis    HPI:  A 53 year old black male with history of GI bleed, uncontrolled hypertension, CVA, chronic diastolic heart failure, and end stage renal disease on dialysis (Monday, Wednesday, Friday) presents to the emergency department with complaints of "coughing up blood" for 1 day. Patient states that he began spitting up a brown-red tinged fluid/mucus Monday when he was eating. He does admit this happening to him before. Patient denies any chest pain, shortness of breath, nausea, or abdominal pain. He states that has not noticed any bloody or dark colored stools. He states that he has not been taking any of his home medications. He was able to complete dialysis yesterday without any issues. He reports no aggravating or alleviating factors. He has tried no medications at home.  He denies chest pain, SOB, dizziness, palpitations, fever/chills, N/V/D, abdominal pain.    Hospital Course:  Vaughn Retana was admitted for hemoptysis, with repeated episodes since admission. He remains HDS. As patient with history of TB, TB work-up initiated, will follow. CT chest with multiple ground-glass nodules throughout the left upper and lower lobes concerning for an infectious or inflammatory process and fluid within the mid and distal esophagus. Anticipate discharge to nursing home pending TB clearance. AFB smears x 3 negative for AFB, airborne precautions discontinued. Likely discharge tomorrow.    Interval History: AFB collected x 3. No TB. Airborne precautions discontinued. Discharge tomorrow.     Review of Systems   Constitutional: " Negative for chills, diaphoresis, fever and unexpected weight change.   Respiratory: Positive for cough (hemoptysis). Negative for chest tightness.    Cardiovascular: Negative for chest pain and palpitations.   Gastrointestinal: Positive for vomiting (spitting up brown-red fluid/mucus). Negative for abdominal pain.   Neurological: Negative for syncope and weakness.   Psychiatric/Behavioral: Negative for agitation and confusion.     Objective:     Vital Signs (Most Recent):  Temp: 97.6 °F (36.4 °C) (12/10/18 1500)  Pulse: 67 (12/10/18 1500)  Resp: 16 (12/10/18 1107)  BP: (!) 110/53 (12/10/18 1107)  SpO2: 99 % (12/10/18 1107) Vital Signs (24h Range):  Temp:  [97.5 °F (36.4 °C)-99.1 °F (37.3 °C)] 97.6 °F (36.4 °C)  Pulse:  [67-80] 67  Resp:  [12-18] 16  SpO2:  [98 %-100 %] 99 %  BP: (110-147)/(53-81) 110/53     Weight: 55.2 kg (121 lb 11.1 oz)  Body mass index is 19.64 kg/m².    Intake/Output Summary (Last 24 hours) at 12/10/2018 1628  Last data filed at 12/10/2018 0500  Gross per 24 hour   Intake 1440 ml   Output 140 ml   Net 1300 ml      Physical Exam   Constitutional: He is oriented to person, place, and time. He appears well-developed. No distress.   Neck: Carotid bruit is not present.   Cardiovascular: Normal rate and regular rhythm.   No murmur heard.  Pulmonary/Chest: Effort normal. No respiratory distress. He has no wheezes.   Coarse breath sounds in lower lobes, bilaterally    Abdominal: Bowel sounds are normal. There is no splenomegaly or hepatomegaly. There is no tenderness.   Musculoskeletal: He exhibits no edema or tenderness.   L leg BKA   Neurological: He is alert and oriented to person, place, and time. No cranial nerve deficit.   Skin: Skin is warm and dry.   Psychiatric: He has a normal mood and affect. His behavior is normal.   Nursing note and vitals reviewed.      Significant Labs: All pertinent labs within the past 24 hours have been reviewed.    Significant Imaging: I have reviewed all pertinent  imaging results/findings within the past 24 hours.    Assessment/Plan:      * Hemoptysis    CT chest with ground glass nodules in left lobe and fluid within the mid and distal esophagus --- predisposing the patient to microaspiration   - outpatient EGD and gastric emptying studies ordered however pt has yet to call to schedule--- encourage pt to have completed ASAP  - TB labs ordered and NEGATIVE for infection, see history of TB   - Nurse reports an episode of pink-tinged sputum 12/5   -Protonix 40 mg PO BID  -EGD in May showed small hiatal hernia, LA Grade D reflux esophagitis, normal stomach, and normal examined duodenum.  -Patient has not had repeat EGD that was recommended in 3 months-- will have him to schedule it prior to discharge     History of TB (tuberculosis)    - TB Gold + in 2015, repeat 2017 negative  - repeat testing ordered, 3 / 3 sputum samples collected, all negative for AFB  - airborne precautions discontinued  - Chest CT with multiple ground glass opacities     Noncompliance    -Patient not taking home medication.  Also not compliant with follow-up  -Counseled on taking home medication  -Restart home medication     Hypertensive urgency    Renovascular hypertension  Improved   -SBP as high as 212/102 in ER.  Will give hydralazine 10 mg IV  -Patient noncompliant with medication regimen.  -Restart Norvasc 10 mg daily, Coreg 25 mg BID, hydralazine 50mg BID.     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, without long-term current use of insulin    -Last Hbg A1C 4.5 10/2018     ESRD on hemodialysis    -Chronic kidney disease-mineral and bone disorder  -Secondary hyperparathyroidism of renal origin    -Consult nephrology to keep patient on dialysis schedule for Monday, Wednesday, Friday  -Continue Renvela 1600mg BID WM  - Patient still makes some urine  - HD scheduled for today     Dyslipidemia    -Restart Lipitor 80 mg daily     Anemia in chronic kidney disease    Chronic and stable         VTE Risk Mitigation (From admission, onward)        Ordered     IP VTE HIGH RISK PATIENT  Once      12/04/18 0220     Place sequential compression device  Until discontinued      12/04/18 0220     Place MEL hose  Until discontinued      12/04/18 0220              Martell Rodriguez PA-C  Department of Hospital Medicine   [facility]

## 2018-12-11 VITALS
WEIGHT: 139.75 LBS | HEIGHT: 66 IN | OXYGEN SATURATION: 99 % | HEART RATE: 78 BPM | DIASTOLIC BLOOD PRESSURE: 84 MMHG | TEMPERATURE: 98 F | SYSTOLIC BLOOD PRESSURE: 150 MMHG | BODY MASS INDEX: 22.46 KG/M2 | RESPIRATION RATE: 18 BRPM

## 2018-12-11 LAB
ALBUMIN SERPL BCP-MCNC: 2.8 G/DL
ALP SERPL-CCNC: 87 U/L
ALT SERPL W/O P-5'-P-CCNC: 11 U/L
ANION GAP SERPL CALC-SCNC: 8 MMOL/L
AST SERPL-CCNC: 16 U/L
BASOPHILS # BLD AUTO: 0.01 K/UL
BASOPHILS NFR BLD: 0.2 %
BILIRUB SERPL-MCNC: 0.3 MG/DL
BUN SERPL-MCNC: 33 MG/DL
CALCIUM SERPL-MCNC: 9.9 MG/DL
CHLORIDE SERPL-SCNC: 104 MMOL/L
CO2 SERPL-SCNC: 25 MMOL/L
CREAT SERPL-MCNC: 6.5 MG/DL
DIFFERENTIAL METHOD: ABNORMAL
EOSINOPHIL # BLD AUTO: 0.4 K/UL
EOSINOPHIL NFR BLD: 7.6 %
ERYTHROCYTE [DISTWIDTH] IN BLOOD BY AUTOMATED COUNT: 13.9 %
EST. GFR  (AFRICAN AMERICAN): 10.2 ML/MIN/1.73 M^2
EST. GFR  (NON AFRICAN AMERICAN): 8.8 ML/MIN/1.73 M^2
GLUCOSE SERPL-MCNC: 135 MG/DL
HAV IGM SERPL QL IA: NEGATIVE
HBV CORE IGM SERPL QL IA: NEGATIVE
HBV SURFACE AG SERPL QL IA: NEGATIVE
HCT VFR BLD AUTO: 35.9 %
HCV AB SERPL QL IA: POSITIVE
HGB BLD-MCNC: 11.7 G/DL
IMM GRANULOCYTES # BLD AUTO: 0.03 K/UL
IMM GRANULOCYTES NFR BLD AUTO: 0.6 %
LYMPHOCYTES # BLD AUTO: 1.2 K/UL
LYMPHOCYTES NFR BLD: 22 %
MAGNESIUM SERPL-MCNC: 2.2 MG/DL
MCH RBC QN AUTO: 31.6 PG
MCHC RBC AUTO-ENTMCNC: 32.6 G/DL
MCV RBC AUTO: 97 FL
MONOCYTES # BLD AUTO: 0.6 K/UL
MONOCYTES NFR BLD: 12 %
NEUTROPHILS # BLD AUTO: 3 K/UL
NEUTROPHILS NFR BLD: 57.6 %
NRBC BLD-RTO: 0 /100 WBC
PHOSPHATE SERPL-MCNC: 1.7 MG/DL
PLATELET # BLD AUTO: 161 K/UL
PMV BLD AUTO: 11.3 FL
POCT GLUCOSE: 114 MG/DL (ref 70–110)
POCT GLUCOSE: 127 MG/DL (ref 70–110)
POCT GLUCOSE: 141 MG/DL (ref 70–110)
POTASSIUM SERPL-SCNC: 4.8 MMOL/L
PROT SERPL-MCNC: 7.5 G/DL
RBC # BLD AUTO: 3.7 M/UL
SODIUM SERPL-SCNC: 137 MMOL/L
WBC # BLD AUTO: 5.27 K/UL

## 2018-12-11 PROCEDURE — 25000003 PHARM REV CODE 250: Performed by: PHYSICIAN ASSISTANT

## 2018-12-11 PROCEDURE — 84100 ASSAY OF PHOSPHORUS: CPT

## 2018-12-11 PROCEDURE — 85025 COMPLETE CBC W/AUTO DIFF WBC: CPT

## 2018-12-11 PROCEDURE — 36415 COLL VENOUS BLD VENIPUNCTURE: CPT

## 2018-12-11 PROCEDURE — 80074 ACUTE HEPATITIS PANEL: CPT

## 2018-12-11 PROCEDURE — 80053 COMPREHEN METABOLIC PANEL: CPT

## 2018-12-11 PROCEDURE — 99239 HOSP IP/OBS DSCHRG MGMT >30: CPT | Mod: ,,, | Performed by: PHYSICIAN ASSISTANT

## 2018-12-11 PROCEDURE — 83735 ASSAY OF MAGNESIUM: CPT

## 2018-12-11 RX ADMIN — SEVELAMER CARBONATE 1600 MG: 800 TABLET, FILM COATED ORAL at 11:12

## 2018-12-11 RX ADMIN — AMLODIPINE BESYLATE 10 MG: 10 TABLET ORAL at 09:12

## 2018-12-11 RX ADMIN — CARVEDILOL 25 MG: 25 TABLET, FILM COATED ORAL at 08:12

## 2018-12-11 RX ADMIN — CARVEDILOL 25 MG: 25 TABLET, FILM COATED ORAL at 04:12

## 2018-12-11 RX ADMIN — PANTOPRAZOLE SODIUM 40 MG: 40 TABLET, DELAYED RELEASE ORAL at 04:12

## 2018-12-11 RX ADMIN — SEVELAMER CARBONATE 1600 MG: 800 TABLET, FILM COATED ORAL at 09:12

## 2018-12-11 RX ADMIN — SEVELAMER CARBONATE 1600 MG: 800 TABLET, FILM COATED ORAL at 04:12

## 2018-12-11 RX ADMIN — STANDARDIZED SENNA CONCENTRATE AND DOCUSATE SODIUM 1 TABLET: 8.6; 5 TABLET, FILM COATED ORAL at 09:12

## 2018-12-11 RX ADMIN — PANTOPRAZOLE SODIUM 40 MG: 40 TABLET, DELAYED RELEASE ORAL at 05:12

## 2018-12-11 RX ADMIN — HYDRALAZINE HYDROCHLORIDE 50 MG: 50 TABLET ORAL at 09:12

## 2018-12-11 NOTE — PLAN OF CARE
[facility]     Department of Hospital Medicine     [address] [place] [address]     [phone] after hours  [phone] fax       NURSING HOME ORDERS    12/11/2018    Admit to Nursing Home:      Skilled Bed                                               Diagnoses:  Active Hospital Problems    Diagnosis  POA    *Hemoptysis [R04.2]  Yes     Priority: 1 - High    History of TB (tuberculosis) [Z86.11]  Yes    Hypertensive urgency [I16.0]  Yes    Noncompliance [Z91.19]  Not Applicable     Chronic    Severe malnutrition [E43]  Yes         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, without long-term current use of insulin [E11.29]  Yes     Chronic    ESRD on hemodialysis [N18.6, Z99.2]  Not Applicable     Chronic     MWF at [facility]      Dyslipidemia [E78.5]  Yes     Chronic    Anemia in chronic kidney disease [N18.9, D63.1]  Yes     Chronic      Resolved Hospital Problems   No resolved problems to display.       Patient is homebound due to:  Hemoptysis    Allergies:  Review of patient's allergies indicates:   Allergen Reactions    Fosrenol [lanthanum] Nausea And Vomiting     Nausea and vomiting       Vitals:   Every shift (Skilled Nursing patients)    Diet: Renal cardiac diabetic diet 2000 calories      Supplement: Glucerna   Nepro      Acitivities:      - Up in a chair each morning as tolerated   - Ambulate with assistance to bathroom   - May ambulate independently   - May use walker, cane, or self-propelled wheelchair    LABS:  Per facility protocol     CMP, CBC each month for 3 months   PT-INR each week for 1 month then monthly   Pre-albumin each month for 3 months   TSH every year     Nursing Precautions:   - Aspiration precautions:             - Total assistance with meals            -  Upright 90 degrees befor during and after meals             -  Suction at bedside          - Fall precautions per nursing home protocol      CONSULTS:     Physical Therapy to evaluate and treat     Occupational Therapy to evaluate and treat            DIABETES CARE:      Check blood sugar:       Fingerstick blood sugar AC and HS   Fingerstick blood sugar every 6 hours if unable to eat      Report CBG < 60 or > 400 to physician.                                          Insulin Sliding Scale          Glucose  Novolog Insulin Subcutaneous        0 - 60   Orange juice or glucose tablet, hold insulin      No insulin   201-250  2 units   251-300  4 units   301-350  6 units   351-400  8 units   >400   10 units then call physician      Medications: Discontinue all previous medication orders, if any. See new list below.     Vaughn Retana   Home Medication Instructions ANABEL:[id number]    Printed on:12/11/18 1260   Medication Information                      amLODIPine (NORVASC) 10 MG tablet  Take 1 tablet (10 mg total) by mouth once daily.             atorvastatin (LIPITOR) 80 MG tablet  Take 1 tablet (80 mg total) by mouth every evening.             carvedilol (COREG) 25 MG tablet  Take 1 tablet (25 mg total) by mouth 2 (two) times daily with meals.             chlorproMAZINE (THORAZINE) 25 MG tablet  Take 1 tablet (25 mg total) by mouth every 12 (twelve) hours as needed.             dicyclomine (BENTYL) 10 MG capsule  Take 1 capsule (10 mg total) by mouth before meals as needed (TID PRN). For belching             hydrALAZINE (APRESOLINE) 50 MG tablet  Take 1 tablet (50 mg total) by mouth every 12 (twelve) hours.             ondansetron (ZOFRAN) 8 MG tablet  Take 1 tablet (8 mg total) by mouth every 8 (eight) hours as needed for Nausea.             pantoprazole (PROTONIX) 40 MG tablet  Take 1 tablet (40 mg total) by mouth 2 (two) times daily before meals.             RENAPLEX-D 800 mcg-12.5 mg -2,000 unit Tab  Take 1 tablet by mouth once daily.             sevelamer carbonate (RENVELA) 800 mg Tab  TAKE 2 TABLETS BY MOUTH THREE TIMES A DAY WITH  MEALS                       _________________________________  Martell Rodriguez PA-C  12/11/2018

## 2018-12-11 NOTE — PLAN OF CARE
12/11/18 1608   Post-Acute Status   Post-Acute Authorization Placement   Post-Acute Placement Status Authorization Obtained     NATTY spoke with the pt's niece Shruthi ([phone]).  The pt's sister is at work.  NATTY informed her of the pt transfer.  Transport was arranged through the [facility] x [phone] and requested for 5pm.  SW was informed by Jerad that the pt can be transported by [facility] and billed to them.  NATTY gave the nurse the number to call report.  Pt was informed of the transfer time by the nurse.  NATTY called [facility]ain to inform them that the pt will return to HD there tomorrow.    Daniela Snider, [facility] x [phone]

## 2018-12-11 NOTE — PLAN OF CARE
12/11/18 1346   Post-Acute Status   Post-Acute Authorization Placement   Post-Acute Placement Status Authorization Obtained     NATTY spoke with the pt's sister.  She stated that she wanted the pt to go to a place in [place] that Jerad spoke with her about.  NATTY spoke with Jerad who stated that was the original plan when the pt was going to [facility].  He can go to [facility] now and they will do SNF with him.  He called the pt's sister to confirm this is the plan.  He called back the SW and updated the SW.  SW sent orders for them to review.  SW left a message that the pt will be transferred in the next few hours and to please call back.  SW will f/u in an hour.    Daniela Snider, [facility] x [phone]

## 2018-12-11 NOTE — PROGRESS NOTES
3.5 hr hd completed, net fluid bmcktxl=008 mls, pt with marginal b/p 90s-100s during the first half time  of the tx, ok to decrease ultrafiltration target goal to 500mls per Dr Arellano.  AVF deaccessed, , pressure held to the site 10 mins and 10 mins, A/V respectively, hemostasis achieved, bruit/thrill present post tx

## 2018-12-12 NOTE — PLAN OF CARE
12/12/18 0543   Final Note   Assessment Type Final Discharge Note     Patient discharged to [facility] on 12/11/18.

## 2018-12-13 NOTE — DISCHARGE SUMMARY
"[facility] Hospital Medicine  Discharge Summary      Patient Name: Vaughn Retana  MRN: [id number]  Admission Date: 12/3/2018  Hospital Length of Stay: 7 days  Discharge Date and Time: 12/11/2018  8:51 PM  Attending Physician: No att. providers found   Discharging Provider: Martell Rodriguez PA-C  Primary Care Provider: Yvette Zelaya NP  [facility] Medicine Team: [facility] E Martell Rodriguez PA-C    HPI:   A 53 year old black male with history of GI bleed, uncontrolled hypertension, CVA, chronic diastolic heart failure, and end stage renal disease on dialysis (Monday, Wednesday, Friday) presents to the emergency department with complaints of "coughing up blood" for 1 day. Patient states that he began spitting up a brown-red tinged fluid/mucus Monday when he was eating. He does admit this happening to him before. Patient denies any chest pain, shortness of breath, nausea, or abdominal pain. He states that has not noticed any bloody or dark colored stools. He states that he has not been taking any of his home medications. He was able to complete dialysis yesterday without any issues. He reports no aggravating or alleviating factors. He has tried no medications at home.  He denies chest pain, SOB, dizziness, palpitations, fever/chills, N/V/D, abdominal pain.    * No surgery found *      Hospital Course:   Vaughn Retana was admitted for hemoptysis, with repeated episodes since admission. He remains HDS. As patient with history of TB, TB work-up initiated, will follow. CT chest with multiple ground-glass nodules throughout the left upper and lower lobes concerning for an infectious or inflammatory process and fluid within the mid and distal esophagus. Anticipate discharge to nursing home pending TB clearance. AFB smears x 3 negative for AFB, airborne precautions discontinued. Patient discharged to nursing home.     Consults:   Consults (From admission, onward)        Status " Ordering Provider     Inpatient consult to Nephrology  Once     Provider:  (Not yet assigned)    Completed AMEE MOSS     Inpatient consult to Registered Dietitian/Nutritionist  Once     Provider:  (Not yet assigned)    Completed ALISIA MAYS          * Hemoptysis    CT chest with ground glass nodules in left lobe and fluid within the mid and distal esophagus --- predisposing the patient to microaspiration   - outpatient EGD and gastric emptying studies ordered however pt has yet to call to schedule--- encourage pt to have completed ASAP  - TB labs ordered and NEGATIVE for infection, see history of TB   - Nurse reports an episode of pink-tinged sputum 12/5   -Protonix 40 mg PO BID  -EGD in May showed small hiatal hernia, LA Grade D reflux esophagitis, normal stomach, and normal examined duodenum.  -Patient has not had repeat EGD that was recommended in 3 months-- will have him to schedule it prior to discharge     History of TB (tuberculosis)    - TB Gold + in 2015, repeat 2017 negative  - repeat testing ordered, 3 / 3 sputum samples collected, all negative for AFB  - airborne precautions discontinued  - Chest CT with multiple ground glass opacities     Noncompliance    -Patient not taking home medication.  Also not compliant with follow-up  -Counseled on taking home medication  -Restart home medication     Hypertensive urgency    Renovascular hypertension  Improved   -SBP as high as 212/102 in ER.  Will give hydralazine 10 mg IV  -Patient noncompliant with medication regimen.  -Restart Norvasc 10 mg daily, Coreg 25 mg BID, hydralazine 50mg BID.     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, without long-term current use of insulin    -Last Hbg A1C 4.5 10/2018     ESRD on hemodialysis    -Chronic kidney disease-mineral and bone disorder  -Secondary hyperparathyroidism of renal origin    -Consult nephrology to keep patient on dialysis schedule for Monday, Wednesday, Friday  -Continue  Renvela 1600mg BID WM  - Patient still makes some urine  - HD scheduled for today     Dyslipidemia    -Restart Lipitor 80 mg daily     Anemia in chronic kidney disease    Chronic and stable        Final Active Diagnoses:    Diagnosis Date Noted POA    PRINCIPAL PROBLEM:  Hemoptysis [R04.2] 12/04/2018 Yes    History of TB (tuberculosis) [Z86.11] 12/05/2018 Yes    Hypertensive urgency [I16.0] 12/04/2018 Yes    Noncompliance [Z91.19] 12/04/2018 Not Applicable     Chronic    Severe malnutrition [E43] 12/04/2018 Yes    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, without long-term current use of insulin [E11.29] 05/21/2018 Yes     Chronic    ESRD on hemodialysis [N18.6, Z99.2] 02/07/2013 Not Applicable     Chronic    Dyslipidemia [E78.5] 02/07/2013 Yes     Chronic    Anemia in chronic kidney disease [N18.9, D63.1] 09/17/2012 Yes     Chronic      Problems Resolved During this Admission:       Discharged Condition: good    Disposition: Skilled Nursing Facility    Follow Up:  Follow-up Information     Xiao Cota PA-C On 12/18/2018.    Specialty:  Internal Medicine  Why:  at 2:30pm; NP Yvette Zelaya not available  Contact information:  [address]  [place] [address] [phone]                 Patient Instructions:      Diet renal     Notify your health care provider if you experience any of the following:  persistent nausea and vomiting or diarrhea     Notify your health care provider if you experience any of the following:  severe uncontrolled pain     Notify your health care provider if you experience any of the following:  severe persistent headache     Notify your health care provider if you experience any of the following:  increased confusion or weakness     Activity as tolerated       Significant Diagnostic Studies: Labs:   BMP:   Recent Labs   Lab 12/11/18  0851   *      K 4.8      CO2 25   BUN 33*   CREATININE 6.5*   CALCIUM 9.9   MG 2.2    and  CBC   Recent Labs   Lab 12/11/18  0851   WBC 5.27   HGB 11.7*   HCT 35.9*          Pending Diagnostic Studies:     Procedure Component Value Units Date/Time    Occult blood x 1, stool [[id number]] Collected:  12/04/18 0306    Order Status:  Sent Lab Status:  In process Updated:  12/04/18 0307    Specimen:  Stool          Medications:  Reconciled Home Medications:      Medication List      CONTINUE taking these medications    amLODIPine 10 MG tablet  Commonly known as:  NORVASC  Take 1 tablet (10 mg total) by mouth once daily.     atorvastatin 80 MG tablet  Commonly known as:  LIPITOR  Take 1 tablet (80 mg total) by mouth every evening.     carvedilol 25 MG tablet  Commonly known as:  COREG  Take 1 tablet (25 mg total) by mouth 2 (two) times daily with meals.     chlorproMAZINE 25 MG tablet  Commonly known as:  THORAZINE  Take 1 tablet (25 mg total) by mouth every 12 (twelve) hours as needed.     dicyclomine 10 MG capsule  Commonly known as:  BENTYL  Take 1 capsule (10 mg total) by mouth before meals as needed (TID PRN). For belching     hydrALAZINE 50 MG tablet  Commonly known as:  APRESOLINE  Take 1 tablet (50 mg total) by mouth every 12 (twelve) hours.     ondansetron 8 MG tablet  Commonly known as:  ZOFRAN  Take 1 tablet (8 mg total) by mouth every 8 (eight) hours as needed for Nausea.     pantoprazole 40 MG tablet  Commonly known as:  PROTONIX  Take 1 tablet (40 mg total) by mouth 2 (two) times daily before meals.     RENAPLEX-D 800 mcg-12.5 mg -2,000 unit Tab  Generic drug:  vit B,C-FA-zinc-selen-vit D3-E  Take 1 tablet by mouth once daily.     sevelamer carbonate 800 mg Tab  Commonly known as:  RENVELA  TAKE 2 TABLETS BY MOUTH THREE TIMES A DAY WITH MEALS        STOP taking these medications    famotidine 40 MG tablet  Commonly known as:  PEPCID            Indwelling Lines/Drains at time of discharge:   Lines/Drains/Airways     Drain                 Hemodialysis AV Fistula Right upper arm -- days                 Time spent on the discharge of patient: 36 minutes  Patient was seen and examined on the date of discharge and determined to be suitable for discharge.         Martell Rodriguez PA-C  Department of Hospital Medicine  [facility]

## 2018-12-17 NOTE — PHYSICIAN QUERY
"PT Name: Vaughn Retana  MR #: [id number]    Physician Query Form - Nutrition Clarification     Ramila Curiel RN, CCDS  Desk # [phone]; brooks # [phone] [email or web address]      This form is a permanent document in the medical record.     Query Date: December 17, 2018    By submitting this query, we are merely seeking further clarification of documentation.. Please utilize your independent clinical judgment when addressing the question(s) below.    The Medical record contains the following:   Indicators  Supporting Clinical Findings Location in Medical Record   x % of Estimated Energy Intake over a time frame from p.o., TF, or TPN Energy Calories Required: not meeting needs  Protein Required: not meeting needs  % Intake of Estimated Energy Needs: 50 - 75 %  % Meal Intake: NPO   RD CN 12/4   x Weight Status over a time frame Per chart, pt has experienced 24% (39 lb) wt loss over the last year. Pt unable to explain why he lost the wt;   RD CN 12/4   x Subcutaneous Fat and/or Muscle Loss moderate muscle wasting of interosseous, temporal, clavicle, calves and quadriceps as well as overall subcutaneous fat loss, moderate triceps fat loss and protruding patellas. RD CN 12/4    Fluid Accumulation or Edema      Reduced  Strength     x Wt / BMI / Usual Body Weight Height: 5' 6" (167.6 cm)  Height (inches): 66 in  Weight Method: Standard Scale  Weight: 55.3 kg (122 lb)  Weight (lb): 122 lb  Ideal Body Weight (IBW), Male: 142 lb  % Ideal Body Weight, Male (lb): 85.92 lb  BMI (Calculated): 19.7  Amputation %: 5.9  Total Amputation %: 5.9 RD CN 12/4    Delayed Wound Healing / Failure to Thrive     x Acute or Chronic Illness Relevant Medical History: CVA, uncontrolled HTN, ESRD on HD, DM2, chronic HF; Recently started on HD    Pt s/p bloody emesis failed CRESENCIO, is NPO. Pt reports eating poorly PTA.     Overall Physical Appearance: loss of muscle mass, loss of subcutaneous fat, weak, amputee(left BKA) RD CN 12/4    " Medication     x Treatment  Recommendation/Intervention: 1. Should pt be cleared for po diet, recommend renal diet with texture per SLP along with Novasource ONS TID. 2. Should pt require enteral feeding, initiate Novasource renal formula at 10ml/hr and advance 10ml Q4hrs to goal rate of 40ml/hr (provides 1920kcal, 87g pro, 691ml free water). Provide additional 500ml water flush/24 hrs. Hold for residuals >500ml.   RD CN 12/4   x Other Severe Malnutrition in the context of Social/Environmental Circumstances     Related to (etiology): Unknown etiology     Signs and Symptoms (as evidenced by):  Energy Intake: per pt, <75% intake over the last year  Body Fat Depletion: overall subcutaneous fat loss, moderate triceps fat loss and protruding patellas.  Muscle Mass Depletion:  moderate muscle wasting of interosseous, temporal, clavicle, calves and quadriceps  Weight Loss: 24% (39 lbs) x 1 year    Severe malnutrition RD CN 12/4                          Active problem list/DC Sum     AND / ASPEN Clinical Characteristics (October 2011)  A minimum of two characteristics is recommended for diagnosing either moderate or severe malnutrition   Mild Malnutrition Moderate Malnutrition Severe Malnutrition   Energy Intake from p.o., TF or TPN. < 75% intake of estimated energy needs for less than 7 days < 75% intake of estimated energy needs for greater than 7 days < 50% intake of estimated energy needs for > 5 days   Weight Loss 1-2% in 1 month  5% in 3 months  7.5% in 6 months  10% in 1 year 1-2 % in 1 week  5% in 1 month  7.5% in 3 months  10% in 6 months  20% in 1 year > 2% in 1 week  > 5% in 1 month  > 7.5% in 3 months  > 10% in 6 months  > 20% in 1 year   Physical Findings     None *Mild subcutaneous fat and/or muscle loss  *Mild fluid accumulation  *Stage II decubitus  *Surgical wound or non-healing wound *Mod/severe subcutaneous fat and/or muscle loss  *Mod/severe fluid accumulation  *Stage III or IV decubitus  *Non-healing  surgical wound     Provider, please specify diagnosis or diagnoses associated with above clinical findings.           Please confirm nutritional diagnosis.     [  ] Severe Protein-Calorie Malnutrition   [  ] Other Nutritional Diagnosis (please specify):    [  ] Other:    [  ] Clinically Undetermined       Please document in your progress notes daily for the duration of treatment until resolved and include in your discharge summary.

## 2018-12-17 NOTE — PHYSICIAN QUERY
"PT Name: Vaughn Retana  MR #: [id number]     Physician Query Form - Etiology of Condition Clarification      Ramila Curiel RN, CCDS  Desk # [phone]; [facility] # [phone] [email or web address]    This form is a permanent document in the medical record.     Query Date: December 17, 2018    By submitting this query, we are merely seeking further clarification of documentation.  Please utilize your independent clinical judgment when addressing the question(s) below.     The medical record contains the following:    Findings Supporting Clinical Information Location in Medical Record   GI bleed       -Consult GI for bloody emesis  -H/H stable at 14.8/44.7.  Will trend.    -Patient received Protonix 80mg IV in ER.  -Protonix 40 mg IV BID    -NPO, aspiration precautions     Per H&P   Hemoptysis     CT chest with ground glass nodules in left lobe and fluid within the mid and distal esophagus --- predisposing the patient to microaspiration   - outpatient EGD and gastric emptying studies ordered however pt has yet to call to schedule--- encourage pt to have completed ASAP  - TB labs ordered and NEGATIVE for infection, see history of TB   - Nurse reports an episode of pink-tinged sputum 12/5   -Protonix 40 mg PO BID  -EGD in May showed small hiatal hernia, LA Grade D reflux esophagitis, normal stomach, and normal examined duodenum.  -Patient has not had repeat EGD that was recommended in 3 months-- will have him to schedule it prior to discharge      History of TB (tuberculosis)     - TB Gold + in 2015, repeat 2017 negative  - repeat testing ordered, 3 / 3 sputum samples collected, all negative for AFB  - airborne precautions discontinued  - Chest CT with multiple ground glass opacities      DC Summ     Please document your best medical opinion regarding the etiology of  "GI Bleed"  for which treatment is/was directed.     Provider use only                   [ x ] Clinically Undetermined     Please document in your " progress notes daily for the duration of treatment, until resolved, and include in your discharge summary.

## 2018-12-19 NOTE — PHYSICIAN QUERY
"PT Name: Vaughn Retana  MR #: [id number]    Physician Query Form - Nutrition Clarification     Ramila Curiel RN, CCDS  Desk # [phone]; [place] # [phone] [email or web address]      This form is a permanent document in the medical record.     Query Date: December 19, 2018    By submitting this query, we are merely seeking further clarification of documentation.. Please utilize your independent clinical judgment when addressing the question(s) below.    The Medical record contains the following:   Indicators  Supporting Clinical Findings Location in Medical Record   x % of Estimated Energy Intake over a time frame from p.o., TF, or TPN Energy Calories Required: not meeting needs  Protein Required: not meeting needs  % Intake of Estimated Energy Needs: 50 - 75 %  % Meal Intake: NPO   RD CN 12/4   x Weight Status over a time frame Per chart, pt has experienced 24% (39 lb) wt loss over the last year. Pt unable to explain why he lost the wt;   RD CN 12/4   x Subcutaneous Fat and/or Muscle Loss moderate muscle wasting of interosseous, temporal, clavicle, calves and quadriceps as well as overall subcutaneous fat loss, moderate triceps fat loss and protruding patellas. RD CN 12/4    Fluid Accumulation or Edema      Reduced  Strength     x Wt / BMI / Usual Body Weight Height: 5' 6" (167.6 cm)  Height (inches): 66 in  Weight Method: Standard Scale  Weight: 55.3 kg (122 lb)  Weight (lb): 122 lb  Ideal Body Weight (IBW), Male: 142 lb  % Ideal Body Weight, Male (lb): 85.92 lb  BMI (Calculated): 19.7  Amputation %: 5.9  Total Amputation %: 5.9 RD CN 12/4    Delayed Wound Healing / Failure to Thrive     x Acute or Chronic Illness Relevant Medical History: CVA, uncontrolled HTN, ESRD on HD, DM2, chronic HF; Recently started on HD    Pt s/p bloody emesis failed CRESENCIO, is NPO. Pt reports eating poorly PTA.     Overall Physical Appearance: loss of muscle mass, loss of subcutaneous fat, weak, amputee(left BKA) RD CN 12/4    " Medication     x Treatment  Recommendation/Intervention: 1. Should pt be cleared for po diet, recommend renal diet with texture per SLP along with Novasource ONS TID. 2. Should pt require enteral feeding, initiate Novasource renal formula at 10ml/hr and advance 10ml Q4hrs to goal rate of 40ml/hr (provides 1920kcal, 87g pro, 691ml free water). Provide additional 500ml water flush/24 hrs. Hold for residuals >500ml.   RD CN 12/4   x Other Severe Malnutrition in the context of Social/Environmental Circumstances     Related to (etiology): Unknown etiology     Signs and Symptoms (as evidenced by):  Energy Intake: per pt, <75% intake over the last year  Body Fat Depletion: overall subcutaneous fat loss, moderate triceps fat loss and protruding patellas.  Muscle Mass Depletion:  moderate muscle wasting of interosseous, temporal, clavicle, calves and quadriceps  Weight Loss: 24% (39 lbs) x 1 year    Severe malnutrition RD CN 12/4                          Active problem list/DC Sum     AND / ASPEN Clinical Characteristics (October 2011)  A minimum of two characteristics is recommended for diagnosing either moderate or severe malnutrition   Mild Malnutrition Moderate Malnutrition Severe Malnutrition   Energy Intake from p.o., TF or TPN. < 75% intake of estimated energy needs for less than 7 days < 75% intake of estimated energy needs for greater than 7 days < 50% intake of estimated energy needs for > 5 days   Weight Loss 1-2% in 1 month  5% in 3 months  7.5% in 6 months  10% in 1 year 1-2 % in 1 week  5% in 1 month  7.5% in 3 months  10% in 6 months  20% in 1 year > 2% in 1 week  > 5% in 1 month  > 7.5% in 3 months  > 10% in 6 months  > 20% in 1 year   Physical Findings     None *Mild subcutaneous fat and/or muscle loss  *Mild fluid accumulation  *Stage II decubitus  *Surgical wound or non-healing wound *Mod/severe subcutaneous fat and/or muscle loss  *Mod/severe fluid accumulation  *Stage III or IV decubitus  *Non-healing  surgical wound     Provider, please specify diagnosis or diagnoses associated with above clinical findings.           Please confirm nutritional diagnosis.     [  x] Severe Protein-Calorie Malnutrition   [  ] Other Nutritional Diagnosis (please specify):    [  ] Other:    [  ] Clinically Undetermined       Please document in your progress notes daily for the duration of treatment until resolved and include in your discharge summary.

## 2019-01-01 ENCOUNTER — TELEPHONE (OUTPATIENT)
Dept: GASTROENTEROLOGY | Facility: CLINIC | Age: 54
End: 2019-01-01

## 2019-01-01 ENCOUNTER — PATIENT MESSAGE (OUTPATIENT)
Dept: ENDOSCOPY | Facility: HOSPITAL | Age: 54
End: 2019-01-01

## 2019-01-01 ENCOUNTER — ANESTHESIA (OUTPATIENT)
Dept: ENDOSCOPY | Facility: HOSPITAL | Age: 54
DRG: 377 | End: 2019-01-01
Payer: MEDICARE

## 2019-01-01 ENCOUNTER — HOSPITAL ENCOUNTER (INPATIENT)
Facility: HOSPITAL | Age: 54
LOS: 4 days | Discharge: NURSING, REHAB OR LONG-TERM CARE | DRG: 377 | End: 2019-11-13
Attending: EMERGENCY MEDICINE | Admitting: HOSPITALIST
Payer: MEDICARE

## 2019-01-01 ENCOUNTER — DOCUMENTATION ONLY (OUTPATIENT)
Dept: HEPATOLOGY | Facility: HOSPITAL | Age: 54
End: 2019-01-01

## 2019-01-01 ENCOUNTER — HOSPITAL ENCOUNTER (EMERGENCY)
Facility: HOSPITAL | Age: 54
Discharge: HOME | End: 2019-09-30
Attending: EMERGENCY MEDICINE
Payer: MEDICARE

## 2019-01-01 ENCOUNTER — ANESTHESIA EVENT (OUTPATIENT)
Dept: ENDOSCOPY | Facility: HOSPITAL | Age: 54
DRG: 377 | End: 2019-01-01
Payer: MEDICARE

## 2019-01-01 ENCOUNTER — HOSPITAL ENCOUNTER (INPATIENT)
Facility: HOSPITAL | Age: 54
LOS: 4 days | Discharge: HOME HEALTH | DRG: 377 | End: 2019-08-23
Attending: EMERGENCY MEDICINE | Admitting: INTERNAL MEDICINE
Payer: MEDICARE

## 2019-01-01 ENCOUNTER — PES CALL (OUTPATIENT)
Dept: ADMINISTRATIVE | Facility: CLINIC | Age: 54
End: 2019-01-01

## 2019-01-01 ENCOUNTER — HOSPITAL ENCOUNTER (INPATIENT)
Facility: HOSPITAL | Age: 54
LOS: 8 days | DRG: 377 | End: 2019-10-08
Attending: EMERGENCY MEDICINE | Admitting: EMERGENCY MEDICINE
Payer: MEDICARE

## 2019-01-01 ENCOUNTER — TELEPHONE (OUTPATIENT)
Dept: ENDOSCOPY | Facility: HOSPITAL | Age: 54
End: 2019-01-01

## 2019-01-01 ENCOUNTER — HOSPITAL ENCOUNTER (EMERGENCY)
Facility: HOSPITAL | Age: 54
Discharge: HOME | End: 2019-11-22
Attending: EMERGENCY MEDICINE
Payer: MEDICARE

## 2019-01-01 ENCOUNTER — HOSPITAL ENCOUNTER (OUTPATIENT)
Facility: HOSPITAL | Age: 54
Discharge: HOME | End: 2019-08-27
Attending: EMERGENCY MEDICINE | Admitting: HOSPITALIST
Payer: MEDICARE

## 2019-01-01 ENCOUNTER — PATIENT OUTREACH (OUTPATIENT)
Dept: ADMINISTRATIVE | Facility: OTHER | Age: 54
End: 2019-01-01

## 2019-01-01 ENCOUNTER — HOSPITAL ENCOUNTER (INPATIENT)
Facility: HOSPITAL | Age: 54
LOS: 3 days | Discharge: HOME | DRG: 377 | End: 2019-09-23
Attending: EMERGENCY MEDICINE | Admitting: HOSPITALIST
Payer: MEDICARE

## 2019-01-01 ENCOUNTER — HOSPITAL ENCOUNTER (OUTPATIENT)
Facility: HOSPITAL | Age: 54
Discharge: HOME | End: 2019-09-07
Attending: EMERGENCY MEDICINE | Admitting: HOSPITALIST
Payer: MEDICARE

## 2019-01-01 VITALS
OXYGEN SATURATION: 100 % | WEIGHT: 160 LBS | HEART RATE: 94 BPM | BODY MASS INDEX: 25.82 KG/M2 | SYSTOLIC BLOOD PRESSURE: 129 MMHG | DIASTOLIC BLOOD PRESSURE: 71 MMHG | RESPIRATION RATE: 15 BRPM | TEMPERATURE: 98 F

## 2019-01-01 VITALS
BODY MASS INDEX: 20.69 KG/M2 | TEMPERATURE: 98 F | RESPIRATION RATE: 16 BRPM | HEART RATE: 90 BPM | OXYGEN SATURATION: 100 % | WEIGHT: 128.75 LBS | SYSTOLIC BLOOD PRESSURE: 148 MMHG | DIASTOLIC BLOOD PRESSURE: 75 MMHG | HEIGHT: 66 IN

## 2019-01-01 VITALS
RESPIRATION RATE: 16 BRPM | SYSTOLIC BLOOD PRESSURE: 111 MMHG | TEMPERATURE: 98 F | WEIGHT: 131.63 LBS | DIASTOLIC BLOOD PRESSURE: 64 MMHG | HEART RATE: 108 BPM | BODY MASS INDEX: 21.16 KG/M2 | HEIGHT: 66 IN | OXYGEN SATURATION: 100 %

## 2019-01-01 VITALS
DIASTOLIC BLOOD PRESSURE: 69 MMHG | HEIGHT: 65 IN | WEIGHT: 132 LBS | TEMPERATURE: 98 F | SYSTOLIC BLOOD PRESSURE: 104 MMHG | HEART RATE: 102 BPM | BODY MASS INDEX: 21.99 KG/M2 | OXYGEN SATURATION: 98 % | RESPIRATION RATE: 18 BRPM

## 2019-01-01 VITALS
WEIGHT: 126.69 LBS | SYSTOLIC BLOOD PRESSURE: 115 MMHG | OXYGEN SATURATION: 100 % | DIASTOLIC BLOOD PRESSURE: 76 MMHG | BODY MASS INDEX: 20.36 KG/M2 | HEIGHT: 66 IN | TEMPERATURE: 97 F | HEART RATE: 86 BPM | RESPIRATION RATE: 18 BRPM

## 2019-01-01 VITALS
RESPIRATION RATE: 18 BRPM | HEART RATE: 107 BPM | SYSTOLIC BLOOD PRESSURE: 121 MMHG | TEMPERATURE: 98 F | OXYGEN SATURATION: 100 % | DIASTOLIC BLOOD PRESSURE: 75 MMHG

## 2019-01-01 VITALS
BODY MASS INDEX: 20.2 KG/M2 | WEIGHT: 125.69 LBS | HEART RATE: 74 BPM | TEMPERATURE: 98 F | OXYGEN SATURATION: 99 % | HEIGHT: 66 IN | DIASTOLIC BLOOD PRESSURE: 78 MMHG | RESPIRATION RATE: 16 BRPM | SYSTOLIC BLOOD PRESSURE: 142 MMHG

## 2019-01-01 VITALS
RESPIRATION RATE: 17 BRPM | BODY MASS INDEX: 22.03 KG/M2 | SYSTOLIC BLOOD PRESSURE: 111 MMHG | OXYGEN SATURATION: 97 % | TEMPERATURE: 98 F | HEART RATE: 80 BPM | DIASTOLIC BLOOD PRESSURE: 56 MMHG | WEIGHT: 132.25 LBS | HEIGHT: 65 IN

## 2019-01-01 DIAGNOSIS — N18.6 ESRD ON HEMODIALYSIS: Chronic | ICD-10-CM

## 2019-01-01 DIAGNOSIS — Z99.2 ESRD ON HEMODIALYSIS: Primary | Chronic | ICD-10-CM

## 2019-01-01 DIAGNOSIS — I10 HYPERTENSION, UNSPECIFIED TYPE: ICD-10-CM

## 2019-01-01 DIAGNOSIS — D63.1 ANEMIA IN CHRONIC KIDNEY DISEASE, ON CHRONIC DIALYSIS: Chronic | ICD-10-CM

## 2019-01-01 DIAGNOSIS — Z99.2 ESRD ON HEMODIALYSIS: Chronic | ICD-10-CM

## 2019-01-01 DIAGNOSIS — E83.39 HYPOPHOSPHATEMIA: ICD-10-CM

## 2019-01-01 DIAGNOSIS — R11.15 EMESIS, PERSISTENT: ICD-10-CM

## 2019-01-01 DIAGNOSIS — Z86.79 HISTORY OF SPONTANEOUS INTRAPARENCHYMAL INTRACRANIAL HEMORRHAGE ASSOCIATED WITH HYPERTENSION: Chronic | ICD-10-CM

## 2019-01-01 DIAGNOSIS — N18.6 ANEMIA IN CHRONIC KIDNEY DISEASE, ON CHRONIC DIALYSIS: Chronic | ICD-10-CM

## 2019-01-01 DIAGNOSIS — E11.51 PERIPHERAL VASCULAR DISEASE IN DIABETES MELLITUS: Chronic | ICD-10-CM

## 2019-01-01 DIAGNOSIS — N18.6 ESRD ON HEMODIALYSIS: ICD-10-CM

## 2019-01-01 DIAGNOSIS — R10.84 GENERALIZED ABDOMINAL PAIN: ICD-10-CM

## 2019-01-01 DIAGNOSIS — Z99.2 ESRD ON HEMODIALYSIS: ICD-10-CM

## 2019-01-01 DIAGNOSIS — I15.0 RENOVASCULAR HYPERTENSION: Chronic | ICD-10-CM

## 2019-01-01 DIAGNOSIS — K92.0 HEMATEMESIS WITH NAUSEA: ICD-10-CM

## 2019-01-01 DIAGNOSIS — K92.0 HEMATEMESIS: Primary | ICD-10-CM

## 2019-01-01 DIAGNOSIS — R11.2 NAUSEA AND VOMITING, INTRACTABILITY OF VOMITING NOT SPECIFIED, UNSPECIFIED VOMITING TYPE: ICD-10-CM

## 2019-01-01 DIAGNOSIS — M54.9 BACK PAIN, UNSPECIFIED BACK LOCATION, UNSPECIFIED BACK PAIN LATERALITY, UNSPECIFIED CHRONICITY: ICD-10-CM

## 2019-01-01 DIAGNOSIS — R04.2 HEMOPTYSIS: ICD-10-CM

## 2019-01-01 DIAGNOSIS — N18.6 ESRD (END STAGE RENAL DISEASE): Primary | ICD-10-CM

## 2019-01-01 DIAGNOSIS — R10.9 ABDOMINAL PAIN: ICD-10-CM

## 2019-01-01 DIAGNOSIS — K29.60 EROSIVE GASTRITIS: ICD-10-CM

## 2019-01-01 DIAGNOSIS — K21.00 GERD WITH ESOPHAGITIS: ICD-10-CM

## 2019-01-01 DIAGNOSIS — R94.31 PROLONGED Q-T INTERVAL ON ECG: ICD-10-CM

## 2019-01-01 DIAGNOSIS — K22.10 EROSIVE ESOPHAGITIS: Primary | ICD-10-CM

## 2019-01-01 DIAGNOSIS — R10.10 PAIN OF UPPER ABDOMEN: ICD-10-CM

## 2019-01-01 DIAGNOSIS — N18.6 ESRD ON HEMODIALYSIS: Primary | Chronic | ICD-10-CM

## 2019-01-01 DIAGNOSIS — D64.9 ANEMIA, UNSPECIFIED TYPE: ICD-10-CM

## 2019-01-01 DIAGNOSIS — R10.9 ABDOMINAL PAIN, UNSPECIFIED ABDOMINAL LOCATION: ICD-10-CM

## 2019-01-01 DIAGNOSIS — R00.0 TACHYCARDIA: ICD-10-CM

## 2019-01-01 DIAGNOSIS — E87.5 HYPERKALEMIA: ICD-10-CM

## 2019-01-01 DIAGNOSIS — Z89.512 HISTORY OF LEFT BELOW KNEE AMPUTATION: Chronic | ICD-10-CM

## 2019-01-01 DIAGNOSIS — R19.7 NAUSEA VOMITING AND DIARRHEA: Primary | ICD-10-CM

## 2019-01-01 DIAGNOSIS — Z99.2 ANEMIA IN CHRONIC KIDNEY DISEASE, ON CHRONIC DIALYSIS: Chronic | ICD-10-CM

## 2019-01-01 DIAGNOSIS — K92.0 COFFEE GROUND EMESIS: ICD-10-CM

## 2019-01-01 DIAGNOSIS — K31.84 GASTROPARESIS: ICD-10-CM

## 2019-01-01 DIAGNOSIS — K20.90 ESOPHAGITIS: Primary | ICD-10-CM

## 2019-01-01 DIAGNOSIS — K92.2 GI BLEED: ICD-10-CM

## 2019-01-01 DIAGNOSIS — R11.2 NAUSEA AND VOMITING: Primary | ICD-10-CM

## 2019-01-01 DIAGNOSIS — E87.6 HYPOKALEMIA: ICD-10-CM

## 2019-01-01 DIAGNOSIS — K92.0 GASTROINTESTINAL HEMORRHAGE WITH HEMATEMESIS: ICD-10-CM

## 2019-01-01 DIAGNOSIS — K92.2 UPPER GI BLEED: Primary | ICD-10-CM

## 2019-01-01 DIAGNOSIS — R74.8 ELEVATED LIPASE: ICD-10-CM

## 2019-01-01 DIAGNOSIS — N18.9 CRI (CHRONIC RENAL INSUFFICIENCY), UNSPECIFIED STAGE: ICD-10-CM

## 2019-01-01 DIAGNOSIS — R11.2 NON-INTRACTABLE VOMITING WITH NAUSEA, UNSPECIFIED VOMITING TYPE: ICD-10-CM

## 2019-01-01 DIAGNOSIS — K20.80 ESOPHAGITIS, LOS ANGELES GRADE D: Primary | ICD-10-CM

## 2019-01-01 DIAGNOSIS — K92.0 COFFEE GROUND EMESIS: Primary | ICD-10-CM

## 2019-01-01 DIAGNOSIS — R11.2 NON-INTRACTABLE VOMITING WITH NAUSEA, UNSPECIFIED VOMITING TYPE: Primary | ICD-10-CM

## 2019-01-01 DIAGNOSIS — D64.9 LOW HEMOGLOBIN: ICD-10-CM

## 2019-01-01 DIAGNOSIS — D50.0 IRON DEFICIENCY ANEMIA DUE TO CHRONIC BLOOD LOSS: ICD-10-CM

## 2019-01-01 DIAGNOSIS — D62 ACUTE BLOOD LOSS ANEMIA: ICD-10-CM

## 2019-01-01 DIAGNOSIS — E11.21 CONTROLLED TYPE 2 DIABETES MELLITUS WITH DIABETIC NEPHROPATHY, WITHOUT LONG-TERM CURRENT USE OF INSULIN: Chronic | ICD-10-CM

## 2019-01-01 DIAGNOSIS — E78.5 DYSLIPIDEMIA: Chronic | ICD-10-CM

## 2019-01-01 DIAGNOSIS — N25.81 SECONDARY HYPERPARATHYROIDISM OF RENAL ORIGIN: Chronic | ICD-10-CM

## 2019-01-01 DIAGNOSIS — R11.2 NAUSEA VOMITING AND DIARRHEA: Primary | ICD-10-CM

## 2019-01-01 DIAGNOSIS — R07.9 CHEST PAIN: ICD-10-CM

## 2019-01-01 DIAGNOSIS — E43 SEVERE MALNUTRITION: ICD-10-CM

## 2019-01-01 DIAGNOSIS — N19 UREMIA: ICD-10-CM

## 2019-01-01 DIAGNOSIS — K20.90 ESOPHAGITIS: ICD-10-CM

## 2019-01-01 LAB
ABO + RH BLD: NORMAL
ALBUMIN SERPL BCP-MCNC: 1.8 G/DL (ref 3.5–5.2)
ALBUMIN SERPL BCP-MCNC: 1.8 G/DL (ref 3.5–5.2)
ALBUMIN SERPL BCP-MCNC: 1.9 G/DL (ref 3.5–5.2)
ALBUMIN SERPL BCP-MCNC: 2 G/DL (ref 3.5–5.2)
ALBUMIN SERPL BCP-MCNC: 2.1 G/DL (ref 3.5–5.2)
ALBUMIN SERPL BCP-MCNC: 2.3 G/DL (ref 3.5–5.2)
ALBUMIN SERPL BCP-MCNC: 2.5 G/DL (ref 3.5–5.2)
ALBUMIN SERPL BCP-MCNC: 2.7 G/DL (ref 3.5–5.2)
ALBUMIN SERPL BCP-MCNC: 2.8 G/DL (ref 3.5–5.2)
ALBUMIN SERPL BCP-MCNC: 2.8 G/DL (ref 3.5–5.2)
ALBUMIN SERPL BCP-MCNC: 2.9 G/DL (ref 3.5–5.2)
ALBUMIN SERPL BCP-MCNC: 2.9 G/DL (ref 3.5–5.2)
ALBUMIN SERPL BCP-MCNC: 3.1 G/DL (ref 3.5–5.2)
ALBUMIN SERPL BCP-MCNC: 3.2 G/DL (ref 3.5–5.2)
ALBUMIN SERPL BCP-MCNC: 3.3 G/DL (ref 3.5–5.2)
ALBUMIN SERPL BCP-MCNC: 3.4 G/DL (ref 3.5–5.2)
ALBUMIN SERPL BCP-MCNC: 3.4 G/DL (ref 3.5–5.2)
ALBUMIN SERPL BCP-MCNC: 3.5 G/DL (ref 3.5–5.2)
ALBUMIN SERPL BCP-MCNC: 3.5 G/DL (ref 3.5–5.2)
ALBUMIN SERPL BCP-MCNC: 3.6 G/DL (ref 3.5–5.2)
ALBUMIN SERPL BCP-MCNC: 3.7 G/DL (ref 3.5–5.2)
ALP SERPL-CCNC: 127 U/L (ref 55–135)
ALP SERPL-CCNC: 134 U/L (ref 55–135)
ALP SERPL-CCNC: 136 U/L (ref 55–135)
ALP SERPL-CCNC: 141 U/L (ref 55–135)
ALP SERPL-CCNC: 142 U/L (ref 55–135)
ALP SERPL-CCNC: 142 U/L (ref 55–135)
ALP SERPL-CCNC: 160 U/L (ref 55–135)
ALP SERPL-CCNC: 171 U/L (ref 55–135)
ALP SERPL-CCNC: 175 U/L (ref 55–135)
ALP SERPL-CCNC: 176 U/L (ref 55–135)
ALP SERPL-CCNC: 183 U/L (ref 55–135)
ALP SERPL-CCNC: 196 U/L (ref 55–135)
ALP SERPL-CCNC: 198 U/L (ref 55–135)
ALP SERPL-CCNC: 208 U/L (ref 55–135)
ALP SERPL-CCNC: 223 U/L (ref 55–135)
ALP SERPL-CCNC: 223 U/L (ref 55–135)
ALP SERPL-CCNC: 225 U/L (ref 55–135)
ALP SERPL-CCNC: 230 U/L (ref 55–135)
ALP SERPL-CCNC: 231 U/L (ref 55–135)
ALP SERPL-CCNC: 234 U/L (ref 55–135)
ALP SERPL-CCNC: 235 U/L (ref 55–135)
ALP SERPL-CCNC: 237 U/L (ref 55–135)
ALP SERPL-CCNC: 239 U/L (ref 55–135)
ALP SERPL-CCNC: 242 U/L (ref 55–135)
ALP SERPL-CCNC: 242 U/L (ref 55–135)
ALP SERPL-CCNC: 257 U/L (ref 55–135)
ALP SERPL-CCNC: 267 U/L (ref 55–135)
ALP SERPL-CCNC: 302 U/L (ref 55–135)
ALT SERPL W/O P-5'-P-CCNC: 10 U/L (ref 10–44)
ALT SERPL W/O P-5'-P-CCNC: 11 U/L (ref 10–44)
ALT SERPL W/O P-5'-P-CCNC: 12 U/L (ref 10–44)
ALT SERPL W/O P-5'-P-CCNC: 12 U/L (ref 10–44)
ALT SERPL W/O P-5'-P-CCNC: 13 U/L (ref 10–44)
ALT SERPL W/O P-5'-P-CCNC: 13 U/L (ref 10–44)
ALT SERPL W/O P-5'-P-CCNC: 14 U/L (ref 10–44)
ALT SERPL W/O P-5'-P-CCNC: 14 U/L (ref 10–44)
ALT SERPL W/O P-5'-P-CCNC: 15 U/L (ref 10–44)
ALT SERPL W/O P-5'-P-CCNC: 17 U/L (ref 10–44)
ALT SERPL W/O P-5'-P-CCNC: 6 U/L (ref 10–44)
ALT SERPL W/O P-5'-P-CCNC: 7 U/L (ref 10–44)
ALT SERPL W/O P-5'-P-CCNC: 8 U/L (ref 10–44)
ALT SERPL W/O P-5'-P-CCNC: 9 U/L (ref 10–44)
AMYLASE SERPL-CCNC: 294 U/L (ref 20–110)
ANION GAP SERPL CALC-SCNC: 10 MMOL/L (ref 8–16)
ANION GAP SERPL CALC-SCNC: 11 MMOL/L (ref 8–16)
ANION GAP SERPL CALC-SCNC: 11 MMOL/L (ref 8–16)
ANION GAP SERPL CALC-SCNC: 12 MMOL/L (ref 8–16)
ANION GAP SERPL CALC-SCNC: 13 MMOL/L (ref 8–16)
ANION GAP SERPL CALC-SCNC: 14 MMOL/L (ref 8–16)
ANION GAP SERPL CALC-SCNC: 14 MMOL/L (ref 8–16)
ANION GAP SERPL CALC-SCNC: 15 MMOL/L (ref 8–16)
ANION GAP SERPL CALC-SCNC: 16 MMOL/L (ref 8–16)
ANION GAP SERPL CALC-SCNC: 17 MMOL/L (ref 8–16)
ANION GAP SERPL CALC-SCNC: 19 MMOL/L (ref 8–16)
ANION GAP SERPL CALC-SCNC: 20 MMOL/L (ref 8–16)
ANION GAP SERPL CALC-SCNC: 5 MMOL/L (ref 8–16)
ANION GAP SERPL CALC-SCNC: 6 MMOL/L (ref 8–16)
ANION GAP SERPL CALC-SCNC: 7 MMOL/L (ref 8–16)
ANION GAP SERPL CALC-SCNC: 8 MMOL/L (ref 8–16)
ANION GAP SERPL CALC-SCNC: 9 MMOL/L (ref 8–16)
ANISOCYTOSIS BLD QL SMEAR: SLIGHT
APTT BLDCRRT: 22.9 SEC (ref 21–32)
AST SERPL-CCNC: 15 U/L (ref 10–40)
AST SERPL-CCNC: 16 U/L (ref 10–40)
AST SERPL-CCNC: 17 U/L (ref 10–40)
AST SERPL-CCNC: 18 U/L (ref 10–40)
AST SERPL-CCNC: 19 U/L (ref 10–40)
AST SERPL-CCNC: 19 U/L (ref 10–40)
AST SERPL-CCNC: 20 U/L (ref 10–40)
AST SERPL-CCNC: 20 U/L (ref 10–40)
AST SERPL-CCNC: 21 U/L (ref 10–40)
AST SERPL-CCNC: 22 U/L (ref 10–40)
AST SERPL-CCNC: 24 U/L (ref 10–40)
AST SERPL-CCNC: 24 U/L (ref 10–40)
AST SERPL-CCNC: 25 U/L (ref 10–40)
AST SERPL-CCNC: 28 U/L (ref 10–40)
AST SERPL-CCNC: 30 U/L (ref 10–40)
AST SERPL-CCNC: 34 U/L (ref 10–40)
BACTERIA BLD CULT: ABNORMAL
BACTERIA BLD CULT: NORMAL
BASOPHILS # BLD AUTO: 0.01 K/UL (ref 0–0.2)
BASOPHILS # BLD AUTO: 0.01 K/UL (ref 0–0.2)
BASOPHILS # BLD AUTO: 0.02 K/UL (ref 0–0.2)
BASOPHILS # BLD AUTO: 0.03 K/UL (ref 0–0.2)
BASOPHILS # BLD AUTO: 0.04 K/UL (ref 0–0.2)
BASOPHILS # BLD AUTO: 0.05 K/UL (ref 0–0.2)
BASOPHILS # BLD AUTO: 0.06 K/UL (ref 0–0.2)
BASOPHILS NFR BLD: 0.1 % (ref 0–1.9)
BASOPHILS NFR BLD: 0.2 % (ref 0–1.9)
BASOPHILS NFR BLD: 0.3 % (ref 0–1.9)
BASOPHILS NFR BLD: 0.4 % (ref 0–1.9)
BASOPHILS NFR BLD: 0.5 % (ref 0–1.9)
BASOPHILS NFR BLD: 0.6 % (ref 0–1.9)
BASOPHILS NFR BLD: 0.7 % (ref 0–1.9)
BASOPHILS NFR BLD: 0.9 % (ref 0–1.9)
BASOPHILS NFR BLD: 1 % (ref 0–1.9)
BILIRUB SERPL-MCNC: 0.2 MG/DL (ref 0.1–1)
BILIRUB SERPL-MCNC: 0.3 MG/DL (ref 0.1–1)
BILIRUB SERPL-MCNC: 0.4 MG/DL (ref 0.1–1)
BILIRUB SERPL-MCNC: 0.5 MG/DL (ref 0.1–1)
BILIRUB SERPL-MCNC: 0.6 MG/DL (ref 0.1–1)
BILIRUB SERPL-MCNC: 0.9 MG/DL (ref 0.1–1)
BLD GP AB SCN CELLS X3 SERPL QL: NORMAL
BLD PROD TYP BPU: NORMAL
BLOOD UNIT EXPIRATION DATE: NORMAL
BLOOD UNIT TYPE CODE: 6200
BLOOD UNIT TYPE: NORMAL
BNP SERPL-MCNC: 81 PG/ML (ref 0–99)
BUN SERPL-MCNC: 10 MG/DL (ref 6–30)
BUN SERPL-MCNC: 11 MG/DL (ref 6–20)
BUN SERPL-MCNC: 14 MG/DL (ref 6–20)
BUN SERPL-MCNC: 15 MG/DL (ref 6–20)
BUN SERPL-MCNC: 16 MG/DL (ref 6–20)
BUN SERPL-MCNC: 17 MG/DL (ref 6–20)
BUN SERPL-MCNC: 18 MG/DL (ref 6–20)
BUN SERPL-MCNC: 19 MG/DL (ref 6–30)
BUN SERPL-MCNC: 20 MG/DL (ref 6–20)
BUN SERPL-MCNC: 22 MG/DL (ref 6–20)
BUN SERPL-MCNC: 23 MG/DL (ref 6–20)
BUN SERPL-MCNC: 23 MG/DL (ref 6–20)
BUN SERPL-MCNC: 25 MG/DL (ref 6–20)
BUN SERPL-MCNC: 25 MG/DL (ref 6–30)
BUN SERPL-MCNC: 27 MG/DL (ref 6–20)
BUN SERPL-MCNC: 27 MG/DL (ref 6–20)
BUN SERPL-MCNC: 29 MG/DL (ref 6–20)
BUN SERPL-MCNC: 30 MG/DL (ref 6–20)
BUN SERPL-MCNC: 31 MG/DL (ref 6–20)
BUN SERPL-MCNC: 34 MG/DL (ref 6–20)
BUN SERPL-MCNC: 36 MG/DL (ref 6–20)
BUN SERPL-MCNC: 36 MG/DL (ref 6–20)
BUN SERPL-MCNC: 42 MG/DL (ref 6–20)
BUN SERPL-MCNC: 45 MG/DL (ref 6–20)
BUN SERPL-MCNC: 58 MG/DL (ref 6–20)
BUN SERPL-MCNC: 8 MG/DL (ref 6–20)
BUN SERPL-MCNC: 8 MG/DL (ref 6–20)
CALCIUM SERPL-MCNC: 7.4 MG/DL (ref 8.7–10.5)
CALCIUM SERPL-MCNC: 7.6 MG/DL (ref 8.7–10.5)
CALCIUM SERPL-MCNC: 7.8 MG/DL (ref 8.7–10.5)
CALCIUM SERPL-MCNC: 7.9 MG/DL (ref 8.7–10.5)
CALCIUM SERPL-MCNC: 8 MG/DL (ref 8.7–10.5)
CALCIUM SERPL-MCNC: 8.1 MG/DL (ref 8.7–10.5)
CALCIUM SERPL-MCNC: 8.2 MG/DL (ref 8.7–10.5)
CALCIUM SERPL-MCNC: 8.2 MG/DL (ref 8.7–10.5)
CALCIUM SERPL-MCNC: 8.4 MG/DL (ref 8.7–10.5)
CALCIUM SERPL-MCNC: 8.4 MG/DL (ref 8.7–10.5)
CALCIUM SERPL-MCNC: 8.5 MG/DL (ref 8.7–10.5)
CALCIUM SERPL-MCNC: 8.6 MG/DL (ref 8.7–10.5)
CALCIUM SERPL-MCNC: 8.7 MG/DL (ref 8.7–10.5)
CALCIUM SERPL-MCNC: 8.8 MG/DL (ref 8.7–10.5)
CALCIUM SERPL-MCNC: 8.8 MG/DL (ref 8.7–10.5)
CALCIUM SERPL-MCNC: 8.9 MG/DL (ref 8.7–10.5)
CALCIUM SERPL-MCNC: 9 MG/DL (ref 8.7–10.5)
CALCIUM SERPL-MCNC: 9.1 MG/DL (ref 8.7–10.5)
CALCIUM SERPL-MCNC: 9.2 MG/DL (ref 8.7–10.5)
CALCIUM SERPL-MCNC: 9.2 MG/DL (ref 8.7–10.5)
CALCIUM SERPL-MCNC: 9.5 MG/DL (ref 8.7–10.5)
CALCIUM SERPL-MCNC: 9.7 MG/DL (ref 8.7–10.5)
CALCIUM SERPL-MCNC: 9.8 MG/DL (ref 8.7–10.5)
CHLORIDE SERPL-SCNC: 100 MMOL/L (ref 95–110)
CHLORIDE SERPL-SCNC: 100 MMOL/L (ref 95–110)
CHLORIDE SERPL-SCNC: 101 MMOL/L (ref 95–110)
CHLORIDE SERPL-SCNC: 102 MMOL/L (ref 95–110)
CHLORIDE SERPL-SCNC: 103 MMOL/L (ref 95–110)
CHLORIDE SERPL-SCNC: 104 MMOL/L (ref 95–110)
CHLORIDE SERPL-SCNC: 105 MMOL/L (ref 95–110)
CHLORIDE SERPL-SCNC: 106 MMOL/L (ref 95–110)
CHLORIDE SERPL-SCNC: 107 MMOL/L (ref 95–110)
CHLORIDE SERPL-SCNC: 107 MMOL/L (ref 95–110)
CHLORIDE SERPL-SCNC: 108 MMOL/L (ref 95–110)
CHLORIDE SERPL-SCNC: 110 MMOL/L (ref 95–110)
CHLORIDE SERPL-SCNC: 112 MMOL/L (ref 95–110)
CHLORIDE SERPL-SCNC: 113 MMOL/L (ref 95–110)
CHLORIDE SERPL-SCNC: 89 MMOL/L (ref 95–110)
CHLORIDE SERPL-SCNC: 90 MMOL/L (ref 95–110)
CHLORIDE SERPL-SCNC: 95 MMOL/L (ref 95–110)
CHLORIDE SERPL-SCNC: 95 MMOL/L (ref 95–110)
CHLORIDE SERPL-SCNC: 97 MMOL/L (ref 95–110)
CHLORIDE SERPL-SCNC: 99 MMOL/L (ref 95–110)
CHLORIDE SERPL-SCNC: 99 MMOL/L (ref 95–110)
CO2 SERPL-SCNC: 19 MMOL/L (ref 23–29)
CO2 SERPL-SCNC: 20 MMOL/L (ref 23–29)
CO2 SERPL-SCNC: 20 MMOL/L (ref 23–29)
CO2 SERPL-SCNC: 21 MMOL/L (ref 23–29)
CO2 SERPL-SCNC: 22 MMOL/L (ref 23–29)
CO2 SERPL-SCNC: 22 MMOL/L (ref 23–29)
CO2 SERPL-SCNC: 23 MMOL/L (ref 23–29)
CO2 SERPL-SCNC: 24 MMOL/L (ref 23–29)
CO2 SERPL-SCNC: 25 MMOL/L (ref 23–29)
CO2 SERPL-SCNC: 26 MMOL/L (ref 23–29)
CO2 SERPL-SCNC: 27 MMOL/L (ref 23–29)
CO2 SERPL-SCNC: 28 MMOL/L (ref 23–29)
CO2 SERPL-SCNC: 29 MMOL/L (ref 23–29)
CO2 SERPL-SCNC: 30 MMOL/L (ref 23–29)
CO2 SERPL-SCNC: 30 MMOL/L (ref 23–29)
CO2 SERPL-SCNC: 31 MMOL/L (ref 23–29)
CO2 SERPL-SCNC: 32 MMOL/L (ref 23–29)
CO2 SERPL-SCNC: 34 MMOL/L (ref 23–29)
CO2 SERPL-SCNC: 39 MMOL/L (ref 23–29)
CODING SYSTEM: NORMAL
CREAT SERPL-MCNC: 11 MG/DL (ref 0.5–1.4)
CREAT SERPL-MCNC: 11.2 MG/DL (ref 0.5–1.4)
CREAT SERPL-MCNC: 11.3 MG/DL (ref 0.5–1.4)
CREAT SERPL-MCNC: 11.3 MG/DL (ref 0.5–1.4)
CREAT SERPL-MCNC: 12.2 MG/DL (ref 0.5–1.4)
CREAT SERPL-MCNC: 12.2 MG/DL (ref 0.5–1.4)
CREAT SERPL-MCNC: 3.5 MG/DL (ref 0.5–1.4)
CREAT SERPL-MCNC: 3.6 MG/DL (ref 0.5–1.4)
CREAT SERPL-MCNC: 4 MG/DL (ref 0.5–1.4)
CREAT SERPL-MCNC: 4.1 MG/DL (ref 0.5–1.4)
CREAT SERPL-MCNC: 4.3 MG/DL (ref 0.5–1.4)
CREAT SERPL-MCNC: 4.5 MG/DL (ref 0.5–1.4)
CREAT SERPL-MCNC: 4.5 MG/DL (ref 0.5–1.4)
CREAT SERPL-MCNC: 4.6 MG/DL (ref 0.5–1.4)
CREAT SERPL-MCNC: 4.9 MG/DL (ref 0.5–1.4)
CREAT SERPL-MCNC: 5 MG/DL (ref 0.5–1.4)
CREAT SERPL-MCNC: 5.1 MG/DL (ref 0.5–1.4)
CREAT SERPL-MCNC: 5.2 MG/DL (ref 0.5–1.4)
CREAT SERPL-MCNC: 5.7 MG/DL (ref 0.5–1.4)
CREAT SERPL-MCNC: 5.9 MG/DL (ref 0.5–1.4)
CREAT SERPL-MCNC: 6.2 MG/DL (ref 0.5–1.4)
CREAT SERPL-MCNC: 6.6 MG/DL (ref 0.5–1.4)
CREAT SERPL-MCNC: 6.6 MG/DL (ref 0.5–1.4)
CREAT SERPL-MCNC: 6.7 MG/DL (ref 0.5–1.4)
CREAT SERPL-MCNC: 6.8 MG/DL (ref 0.5–1.4)
CREAT SERPL-MCNC: 6.9 MG/DL (ref 0.5–1.4)
CREAT SERPL-MCNC: 7.8 MG/DL (ref 0.5–1.4)
CREAT SERPL-MCNC: 7.9 MG/DL (ref 0.5–1.4)
CREAT SERPL-MCNC: 7.9 MG/DL (ref 0.5–1.4)
CREAT SERPL-MCNC: 8.3 MG/DL (ref 0.5–1.4)
CREAT SERPL-MCNC: 8.3 MG/DL (ref 0.5–1.4)
CREAT SERPL-MCNC: 8.4 MG/DL (ref 0.5–1.4)
CREAT SERPL-MCNC: 8.6 MG/DL (ref 0.5–1.4)
CREAT SERPL-MCNC: 8.7 MG/DL (ref 0.5–1.4)
CREAT SERPL-MCNC: 9 MG/DL (ref 0.5–1.4)
CREAT SERPL-MCNC: 9.8 MG/DL (ref 0.5–1.4)
DIFFERENTIAL METHOD: ABNORMAL
DISPENSE STATUS: NORMAL
EOSINOPHIL # BLD AUTO: 0.1 K/UL (ref 0–0.5)
EOSINOPHIL # BLD AUTO: 0.2 K/UL (ref 0–0.5)
EOSINOPHIL # BLD AUTO: 0.3 K/UL (ref 0–0.5)
EOSINOPHIL # BLD AUTO: 0.4 K/UL (ref 0–0.5)
EOSINOPHIL # BLD AUTO: 0.5 K/UL (ref 0–0.5)
EOSINOPHIL # BLD AUTO: 0.5 K/UL (ref 0–0.5)
EOSINOPHIL # BLD AUTO: 0.6 K/UL (ref 0–0.5)
EOSINOPHIL # BLD AUTO: 0.7 K/UL (ref 0–0.5)
EOSINOPHIL # BLD AUTO: 0.8 K/UL (ref 0–0.5)
EOSINOPHIL # BLD AUTO: 0.9 K/UL (ref 0–0.5)
EOSINOPHIL # BLD AUTO: 0.9 K/UL (ref 0–0.5)
EOSINOPHIL # BLD AUTO: 1 K/UL (ref 0–0.5)
EOSINOPHIL # BLD AUTO: 1.1 K/UL (ref 0–0.5)
EOSINOPHIL # BLD AUTO: 1.1 K/UL (ref 0–0.5)
EOSINOPHIL NFR BLD: 0.5 % (ref 0–8)
EOSINOPHIL NFR BLD: 0.9 % (ref 0–8)
EOSINOPHIL NFR BLD: 1.4 % (ref 0–8)
EOSINOPHIL NFR BLD: 1.4 % (ref 0–8)
EOSINOPHIL NFR BLD: 1.7 % (ref 0–8)
EOSINOPHIL NFR BLD: 1.7 % (ref 0–8)
EOSINOPHIL NFR BLD: 1.8 % (ref 0–8)
EOSINOPHIL NFR BLD: 10.7 % (ref 0–8)
EOSINOPHIL NFR BLD: 11.8 % (ref 0–8)
EOSINOPHIL NFR BLD: 12.5 % (ref 0–8)
EOSINOPHIL NFR BLD: 12.9 % (ref 0–8)
EOSINOPHIL NFR BLD: 14 % (ref 0–8)
EOSINOPHIL NFR BLD: 14.3 % (ref 0–8)
EOSINOPHIL NFR BLD: 15.4 % (ref 0–8)
EOSINOPHIL NFR BLD: 2.1 % (ref 0–8)
EOSINOPHIL NFR BLD: 2.2 % (ref 0–8)
EOSINOPHIL NFR BLD: 2.7 % (ref 0–8)
EOSINOPHIL NFR BLD: 2.9 % (ref 0–8)
EOSINOPHIL NFR BLD: 3.2 % (ref 0–8)
EOSINOPHIL NFR BLD: 3.5 % (ref 0–8)
EOSINOPHIL NFR BLD: 3.6 % (ref 0–8)
EOSINOPHIL NFR BLD: 3.7 % (ref 0–8)
EOSINOPHIL NFR BLD: 3.7 % (ref 0–8)
EOSINOPHIL NFR BLD: 3.8 % (ref 0–8)
EOSINOPHIL NFR BLD: 4.1 % (ref 0–8)
EOSINOPHIL NFR BLD: 4.2 % (ref 0–8)
EOSINOPHIL NFR BLD: 4.3 % (ref 0–8)
EOSINOPHIL NFR BLD: 4.5 % (ref 0–8)
EOSINOPHIL NFR BLD: 4.7 % (ref 0–8)
EOSINOPHIL NFR BLD: 4.7 % (ref 0–8)
EOSINOPHIL NFR BLD: 5.1 % (ref 0–8)
EOSINOPHIL NFR BLD: 5.2 % (ref 0–8)
EOSINOPHIL NFR BLD: 5.3 % (ref 0–8)
EOSINOPHIL NFR BLD: 5.3 % (ref 0–8)
EOSINOPHIL NFR BLD: 5.4 % (ref 0–8)
EOSINOPHIL NFR BLD: 5.4 % (ref 0–8)
EOSINOPHIL NFR BLD: 5.7 % (ref 0–8)
EOSINOPHIL NFR BLD: 5.8 % (ref 0–8)
EOSINOPHIL NFR BLD: 5.9 % (ref 0–8)
EOSINOPHIL NFR BLD: 6.3 % (ref 0–8)
EOSINOPHIL NFR BLD: 6.4 % (ref 0–8)
EOSINOPHIL NFR BLD: 6.8 % (ref 0–8)
EOSINOPHIL NFR BLD: 6.8 % (ref 0–8)
EOSINOPHIL NFR BLD: 7.1 % (ref 0–8)
EOSINOPHIL NFR BLD: 7.4 % (ref 0–8)
EOSINOPHIL NFR BLD: 8.6 % (ref 0–8)
EOSINOPHIL NFR BLD: 8.6 % (ref 0–8)
EOSINOPHIL NFR BLD: 8.8 % (ref 0–8)
EOSINOPHIL NFR BLD: 8.8 % (ref 0–8)
EOSINOPHIL NFR BLD: 9.3 % (ref 0–8)
EOSINOPHIL NFR BLD: 9.6 % (ref 0–8)
EOSINOPHIL NFR BLD: 9.9 % (ref 0–8)
ERYTHROCYTE [DISTWIDTH] IN BLOOD BY AUTOMATED COUNT: 14.3 % (ref 11.5–14.5)
ERYTHROCYTE [DISTWIDTH] IN BLOOD BY AUTOMATED COUNT: 14.3 % (ref 11.5–14.5)
ERYTHROCYTE [DISTWIDTH] IN BLOOD BY AUTOMATED COUNT: 14.4 % (ref 11.5–14.5)
ERYTHROCYTE [DISTWIDTH] IN BLOOD BY AUTOMATED COUNT: 14.5 % (ref 11.5–14.5)
ERYTHROCYTE [DISTWIDTH] IN BLOOD BY AUTOMATED COUNT: 14.6 % (ref 11.5–14.5)
ERYTHROCYTE [DISTWIDTH] IN BLOOD BY AUTOMATED COUNT: 14.7 % (ref 11.5–14.5)
ERYTHROCYTE [DISTWIDTH] IN BLOOD BY AUTOMATED COUNT: 14.8 % (ref 11.5–14.5)
ERYTHROCYTE [DISTWIDTH] IN BLOOD BY AUTOMATED COUNT: 14.9 % (ref 11.5–14.5)
ERYTHROCYTE [DISTWIDTH] IN BLOOD BY AUTOMATED COUNT: 15.1 % (ref 11.5–14.5)
ERYTHROCYTE [DISTWIDTH] IN BLOOD BY AUTOMATED COUNT: 15.2 % (ref 11.5–14.5)
ERYTHROCYTE [DISTWIDTH] IN BLOOD BY AUTOMATED COUNT: 15.3 % (ref 11.5–14.5)
ERYTHROCYTE [DISTWIDTH] IN BLOOD BY AUTOMATED COUNT: 15.4 % (ref 11.5–14.5)
ERYTHROCYTE [DISTWIDTH] IN BLOOD BY AUTOMATED COUNT: 15.5 % (ref 11.5–14.5)
ERYTHROCYTE [DISTWIDTH] IN BLOOD BY AUTOMATED COUNT: 15.7 % (ref 11.5–14.5)
ERYTHROCYTE [DISTWIDTH] IN BLOOD BY AUTOMATED COUNT: 15.7 % (ref 11.5–14.5)
ERYTHROCYTE [DISTWIDTH] IN BLOOD BY AUTOMATED COUNT: 15.8 % (ref 11.5–14.5)
ERYTHROCYTE [DISTWIDTH] IN BLOOD BY AUTOMATED COUNT: 15.8 % (ref 11.5–14.5)
ERYTHROCYTE [DISTWIDTH] IN BLOOD BY AUTOMATED COUNT: 15.9 % (ref 11.5–14.5)
ERYTHROCYTE [DISTWIDTH] IN BLOOD BY AUTOMATED COUNT: 16 % (ref 11.5–14.5)
ERYTHROCYTE [DISTWIDTH] IN BLOOD BY AUTOMATED COUNT: 16 % (ref 11.5–14.5)
ERYTHROCYTE [DISTWIDTH] IN BLOOD BY AUTOMATED COUNT: 16.1 % (ref 11.5–14.5)
ERYTHROCYTE [DISTWIDTH] IN BLOOD BY AUTOMATED COUNT: 16.3 % (ref 11.5–14.5)
ERYTHROCYTE [DISTWIDTH] IN BLOOD BY AUTOMATED COUNT: 16.5 % (ref 11.5–14.5)
ERYTHROCYTE [DISTWIDTH] IN BLOOD BY AUTOMATED COUNT: 16.8 % (ref 11.5–14.5)
ERYTHROCYTE [DISTWIDTH] IN BLOOD BY AUTOMATED COUNT: 17.6 % (ref 11.5–14.5)
ERYTHROCYTE [DISTWIDTH] IN BLOOD BY AUTOMATED COUNT: 18.2 % (ref 11.5–14.5)
ERYTHROCYTE [DISTWIDTH] IN BLOOD BY AUTOMATED COUNT: 19.8 % (ref 11.5–14.5)
EST. GFR  (AFRICAN AMERICAN): 10 ML/MIN/1.73 M^2
EST. GFR  (AFRICAN AMERICAN): 10 ML/MIN/1.73 M^2
EST. GFR  (AFRICAN AMERICAN): 10.7 ML/MIN/1.73 M^2
EST. GFR  (AFRICAN AMERICAN): 11.4 ML/MIN/1.73 M^2
EST. GFR  (AFRICAN AMERICAN): 11.9 ML/MIN/1.73 M^2
EST. GFR  (AFRICAN AMERICAN): 13.3 ML/MIN/1.73 M^2
EST. GFR  (AFRICAN AMERICAN): 13.9 ML/MIN/1.73 M^2
EST. GFR  (AFRICAN AMERICAN): 14.3 ML/MIN/1.73 M^2
EST. GFR  (AFRICAN AMERICAN): 15.4 ML/MIN/1.73 M^2
EST. GFR  (AFRICAN AMERICAN): 15.8 ML/MIN/1.73 M^2
EST. GFR  (AFRICAN AMERICAN): 15.8 ML/MIN/1.73 M^2
EST. GFR  (AFRICAN AMERICAN): 17.7 ML/MIN/1.73 M^2
EST. GFR  (AFRICAN AMERICAN): 18.2 ML/MIN/1.73 M^2
EST. GFR  (AFRICAN AMERICAN): 20.7 ML/MIN/1.73 M^2
EST. GFR  (AFRICAN AMERICAN): 21.4 ML/MIN/1.73 M^2
EST. GFR  (AFRICAN AMERICAN): 4.7 ML/MIN/1.73 M^2
EST. GFR  (AFRICAN AMERICAN): 4.7 ML/MIN/1.73 M^2
EST. GFR  (AFRICAN AMERICAN): 5.2 ML/MIN/1.73 M^2
EST. GFR  (AFRICAN AMERICAN): 5.2 ML/MIN/1.73 M^2
EST. GFR  (AFRICAN AMERICAN): 5.4 ML/MIN/1.73 M^2
EST. GFR  (AFRICAN AMERICAN): 6.2 ML/MIN/1.73 M^2
EST. GFR  (AFRICAN AMERICAN): 6.8 ML/MIN/1.73 M^2
EST. GFR  (AFRICAN AMERICAN): 7 ML/MIN/1.73 M^2
EST. GFR  (AFRICAN AMERICAN): 7.1 ML/MIN/1.73 M^2
EST. GFR  (AFRICAN AMERICAN): 7.2 ML/MIN/1.73 M^2
EST. GFR  (AFRICAN AMERICAN): 7.5 ML/MIN/1.73 M^2
EST. GFR  (AFRICAN AMERICAN): 7.5 ML/MIN/1.73 M^2
EST. GFR  (AFRICAN AMERICAN): 8 ML/MIN/1.73 M^2
EST. GFR  (AFRICAN AMERICAN): 8 ML/MIN/1.73 M^2
EST. GFR  (AFRICAN AMERICAN): 8.1 ML/MIN/1.73 M^2
EST. GFR  (AFRICAN AMERICAN): 9.4 ML/MIN/1.73 M^2
EST. GFR  (AFRICAN AMERICAN): 9.6 ML/MIN/1.73 M^2
EST. GFR  (AFRICAN AMERICAN): 9.8 ML/MIN/1.73 M^2
EST. GFR  (NON AFRICAN AMERICAN): 10.3 ML/MIN/1.73 M^2
EST. GFR  (NON AFRICAN AMERICAN): 11.5 ML/MIN/1.73 M^2
EST. GFR  (NON AFRICAN AMERICAN): 12.1 ML/MIN/1.73 M^2
EST. GFR  (NON AFRICAN AMERICAN): 12.3 ML/MIN/1.73 M^2
EST. GFR  (NON AFRICAN AMERICAN): 13.3 ML/MIN/1.73 M^2
EST. GFR  (NON AFRICAN AMERICAN): 13.7 ML/MIN/1.73 M^2
EST. GFR  (NON AFRICAN AMERICAN): 13.7 ML/MIN/1.73 M^2
EST. GFR  (NON AFRICAN AMERICAN): 15.3 ML/MIN/1.73 M^2
EST. GFR  (NON AFRICAN AMERICAN): 15.8 ML/MIN/1.73 M^2
EST. GFR  (NON AFRICAN AMERICAN): 17.9 ML/MIN/1.73 M^2
EST. GFR  (NON AFRICAN AMERICAN): 18.5 ML/MIN/1.73 M^2
EST. GFR  (NON AFRICAN AMERICAN): 4.1 ML/MIN/1.73 M^2
EST. GFR  (NON AFRICAN AMERICAN): 4.1 ML/MIN/1.73 M^2
EST. GFR  (NON AFRICAN AMERICAN): 4.5 ML/MIN/1.73 M^2
EST. GFR  (NON AFRICAN AMERICAN): 4.5 ML/MIN/1.73 M^2
EST. GFR  (NON AFRICAN AMERICAN): 4.6 ML/MIN/1.73 M^2
EST. GFR  (NON AFRICAN AMERICAN): 5.3 ML/MIN/1.73 M^2
EST. GFR  (NON AFRICAN AMERICAN): 5.9 ML/MIN/1.73 M^2
EST. GFR  (NON AFRICAN AMERICAN): 6 ML/MIN/1.73 M^2
EST. GFR  (NON AFRICAN AMERICAN): 6.2 ML/MIN/1.73 M^2
EST. GFR  (NON AFRICAN AMERICAN): 6.3 ML/MIN/1.73 M^2
EST. GFR  (NON AFRICAN AMERICAN): 6.5 ML/MIN/1.73 M^2
EST. GFR  (NON AFRICAN AMERICAN): 6.5 ML/MIN/1.73 M^2
EST. GFR  (NON AFRICAN AMERICAN): 6.9 ML/MIN/1.73 M^2
EST. GFR  (NON AFRICAN AMERICAN): 6.9 ML/MIN/1.73 M^2
EST. GFR  (NON AFRICAN AMERICAN): 7 ML/MIN/1.73 M^2
EST. GFR  (NON AFRICAN AMERICAN): 8.2 ML/MIN/1.73 M^2
EST. GFR  (NON AFRICAN AMERICAN): 8.3 ML/MIN/1.73 M^2
EST. GFR  (NON AFRICAN AMERICAN): 8.5 ML/MIN/1.73 M^2
EST. GFR  (NON AFRICAN AMERICAN): 8.6 ML/MIN/1.73 M^2
EST. GFR  (NON AFRICAN AMERICAN): 8.6 ML/MIN/1.73 M^2
EST. GFR  (NON AFRICAN AMERICAN): 9.3 ML/MIN/1.73 M^2
EST. GFR  (NON AFRICAN AMERICAN): 9.9 ML/MIN/1.73 M^2
ESTIMATED AVG GLUCOSE: 68 MG/DL (ref 68–131)
FERRITIN SERPL-MCNC: 479 NG/ML (ref 20–300)
FINAL PATHOLOGIC DIAGNOSIS: NORMAL
FOLATE SERPL-MCNC: 10.2 NG/ML (ref 4–24)
GIANT PLATELETS BLD QL SMEAR: PRESENT
GLUCOSE SERPL-MCNC: 100 MG/DL (ref 70–110)
GLUCOSE SERPL-MCNC: 104 MG/DL (ref 70–110)
GLUCOSE SERPL-MCNC: 107 MG/DL (ref 70–110)
GLUCOSE SERPL-MCNC: 107 MG/DL (ref 70–110)
GLUCOSE SERPL-MCNC: 108 MG/DL (ref 70–110)
GLUCOSE SERPL-MCNC: 113 MG/DL (ref 70–110)
GLUCOSE SERPL-MCNC: 115 MG/DL (ref 70–110)
GLUCOSE SERPL-MCNC: 117 MG/DL (ref 70–110)
GLUCOSE SERPL-MCNC: 117 MG/DL (ref 70–110)
GLUCOSE SERPL-MCNC: 119 MG/DL (ref 70–110)
GLUCOSE SERPL-MCNC: 120 MG/DL (ref 70–110)
GLUCOSE SERPL-MCNC: 123 MG/DL (ref 70–110)
GLUCOSE SERPL-MCNC: 126 MG/DL (ref 70–110)
GLUCOSE SERPL-MCNC: 129 MG/DL (ref 70–110)
GLUCOSE SERPL-MCNC: 136 MG/DL (ref 70–110)
GLUCOSE SERPL-MCNC: 144 MG/DL (ref 70–110)
GLUCOSE SERPL-MCNC: 165 MG/DL (ref 70–110)
GLUCOSE SERPL-MCNC: 66 MG/DL (ref 70–110)
GLUCOSE SERPL-MCNC: 67 MG/DL (ref 70–110)
GLUCOSE SERPL-MCNC: 70 MG/DL (ref 70–110)
GLUCOSE SERPL-MCNC: 72 MG/DL (ref 70–110)
GLUCOSE SERPL-MCNC: 73 MG/DL (ref 70–110)
GLUCOSE SERPL-MCNC: 74 MG/DL (ref 70–110)
GLUCOSE SERPL-MCNC: 75 MG/DL (ref 70–110)
GLUCOSE SERPL-MCNC: 77 MG/DL (ref 70–110)
GLUCOSE SERPL-MCNC: 78 MG/DL (ref 70–110)
GLUCOSE SERPL-MCNC: 80 MG/DL (ref 70–110)
GLUCOSE SERPL-MCNC: 82 MG/DL (ref 70–110)
GLUCOSE SERPL-MCNC: 83 MG/DL (ref 70–110)
GLUCOSE SERPL-MCNC: 86 MG/DL (ref 70–110)
GLUCOSE SERPL-MCNC: 93 MG/DL (ref 70–110)
GLUCOSE SERPL-MCNC: 96 MG/DL (ref 70–110)
GLUCOSE SERPL-MCNC: 97 MG/DL (ref 70–110)
GLUCOSE SERPL-MCNC: 98 MG/DL (ref 70–110)
GROSS: NORMAL
HAV IGM SERPL QL IA: NEGATIVE
HBA1C MFR BLD HPLC: 4 % (ref 4–5.6)
HBV CORE IGM SERPL QL IA: NEGATIVE
HBV SURFACE AB SER-ACNC: POSITIVE M[IU]/ML
HBV SURFACE AG SERPL QL IA: NEGATIVE
HBV SURFACE AG SERPL QL IA: NEGATIVE
HCT VFR BLD AUTO: 18.6 % (ref 40–54)
HCT VFR BLD AUTO: 19.4 % (ref 40–54)
HCT VFR BLD AUTO: 20.4 % (ref 40–54)
HCT VFR BLD AUTO: 20.4 % (ref 40–54)
HCT VFR BLD AUTO: 20.6 % (ref 40–54)
HCT VFR BLD AUTO: 21.7 % (ref 40–54)
HCT VFR BLD AUTO: 21.8 % (ref 40–54)
HCT VFR BLD AUTO: 22.3 % (ref 40–54)
HCT VFR BLD AUTO: 22.3 % (ref 40–54)
HCT VFR BLD AUTO: 23.4 % (ref 40–54)
HCT VFR BLD AUTO: 23.5 % (ref 40–54)
HCT VFR BLD AUTO: 23.9 % (ref 40–54)
HCT VFR BLD AUTO: 24.1 % (ref 40–54)
HCT VFR BLD AUTO: 24.3 % (ref 40–54)
HCT VFR BLD AUTO: 24.4 % (ref 40–54)
HCT VFR BLD AUTO: 24.8 % (ref 40–54)
HCT VFR BLD AUTO: 25.1 % (ref 40–54)
HCT VFR BLD AUTO: 25.6 % (ref 40–54)
HCT VFR BLD AUTO: 25.7 % (ref 40–54)
HCT VFR BLD AUTO: 26.3 % (ref 40–54)
HCT VFR BLD AUTO: 26.3 % (ref 40–54)
HCT VFR BLD AUTO: 26.4 % (ref 40–54)
HCT VFR BLD AUTO: 26.5 % (ref 40–54)
HCT VFR BLD AUTO: 26.8 % (ref 40–54)
HCT VFR BLD AUTO: 27.3 % (ref 40–54)
HCT VFR BLD AUTO: 27.9 % (ref 40–54)
HCT VFR BLD AUTO: 28.1 % (ref 40–54)
HCT VFR BLD AUTO: 28.3 % (ref 40–54)
HCT VFR BLD AUTO: 28.7 % (ref 40–54)
HCT VFR BLD AUTO: 28.9 % (ref 40–54)
HCT VFR BLD AUTO: 29.4 % (ref 40–54)
HCT VFR BLD AUTO: 29.5 % (ref 40–54)
HCT VFR BLD AUTO: 29.9 % (ref 40–54)
HCT VFR BLD AUTO: 30 % (ref 40–54)
HCT VFR BLD AUTO: 30 % (ref 40–54)
HCT VFR BLD AUTO: 30.9 % (ref 40–54)
HCT VFR BLD AUTO: 31.1 % (ref 40–54)
HCT VFR BLD AUTO: 31.3 % (ref 40–54)
HCT VFR BLD AUTO: 31.8 % (ref 40–54)
HCT VFR BLD AUTO: 31.9 % (ref 40–54)
HCT VFR BLD AUTO: 32.3 % (ref 40–54)
HCT VFR BLD AUTO: 32.4 % (ref 40–54)
HCT VFR BLD AUTO: 33.5 % (ref 40–54)
HCT VFR BLD AUTO: 34.3 % (ref 40–54)
HCT VFR BLD AUTO: 34.4 % (ref 40–54)
HCT VFR BLD AUTO: 35.1 % (ref 40–54)
HCT VFR BLD AUTO: 35.1 % (ref 40–54)
HCT VFR BLD AUTO: 35.5 % (ref 40–54)
HCT VFR BLD AUTO: 36 % (ref 40–54)
HCT VFR BLD AUTO: 37.2 % (ref 40–54)
HCT VFR BLD AUTO: 37.4 % (ref 40–54)
HCT VFR BLD AUTO: 37.6 % (ref 40–54)
HCT VFR BLD AUTO: 38.8 % (ref 40–54)
HCT VFR BLD AUTO: 40.2 % (ref 40–54)
HCT VFR BLD AUTO: 44.1 % (ref 40–54)
HCT VFR BLD CALC: 22 %PCV (ref 36–54)
HCT VFR BLD CALC: 27 %PCV (ref 36–54)
HCT VFR BLD CALC: 37 %PCV (ref 36–54)
HCV AB SERPL QL IA: POSITIVE
HGB BLD-MCNC: 10.1 G/DL (ref 14–18)
HGB BLD-MCNC: 10.1 G/DL (ref 14–18)
HGB BLD-MCNC: 10.2 G/DL (ref 14–18)
HGB BLD-MCNC: 10.2 G/DL (ref 14–18)
HGB BLD-MCNC: 10.3 G/DL (ref 14–18)
HGB BLD-MCNC: 10.3 G/DL (ref 14–18)
HGB BLD-MCNC: 10.4 G/DL (ref 14–18)
HGB BLD-MCNC: 10.4 G/DL (ref 14–18)
HGB BLD-MCNC: 10.6 G/DL (ref 14–18)
HGB BLD-MCNC: 10.8 G/DL (ref 14–18)
HGB BLD-MCNC: 11 G/DL (ref 14–18)
HGB BLD-MCNC: 11.1 G/DL (ref 14–18)
HGB BLD-MCNC: 11.2 G/DL (ref 14–18)
HGB BLD-MCNC: 11.5 G/DL (ref 14–18)
HGB BLD-MCNC: 11.6 G/DL (ref 14–18)
HGB BLD-MCNC: 11.8 G/DL (ref 14–18)
HGB BLD-MCNC: 12 G/DL (ref 14–18)
HGB BLD-MCNC: 12.4 G/DL (ref 14–18)
HGB BLD-MCNC: 12.7 G/DL (ref 14–18)
HGB BLD-MCNC: 13.4 G/DL (ref 14–18)
HGB BLD-MCNC: 6.1 G/DL (ref 14–18)
HGB BLD-MCNC: 6.2 G/DL (ref 14–18)
HGB BLD-MCNC: 6.3 G/DL (ref 14–18)
HGB BLD-MCNC: 6.7 G/DL (ref 14–18)
HGB BLD-MCNC: 6.8 G/DL (ref 14–18)
HGB BLD-MCNC: 7 G/DL (ref 14–18)
HGB BLD-MCNC: 7.1 G/DL (ref 14–18)
HGB BLD-MCNC: 7.1 G/DL (ref 14–18)
HGB BLD-MCNC: 7.2 G/DL (ref 14–18)
HGB BLD-MCNC: 7.2 G/DL (ref 14–18)
HGB BLD-MCNC: 7.5 G/DL (ref 14–18)
HGB BLD-MCNC: 7.6 G/DL (ref 14–18)
HGB BLD-MCNC: 7.6 G/DL (ref 14–18)
HGB BLD-MCNC: 7.7 G/DL (ref 14–18)
HGB BLD-MCNC: 7.8 G/DL (ref 14–18)
HGB BLD-MCNC: 7.9 G/DL (ref 14–18)
HGB BLD-MCNC: 8.1 G/DL (ref 14–18)
HGB BLD-MCNC: 8.4 G/DL (ref 14–18)
HGB BLD-MCNC: 8.4 G/DL (ref 14–18)
HGB BLD-MCNC: 8.5 G/DL (ref 14–18)
HGB BLD-MCNC: 8.6 G/DL (ref 14–18)
HGB BLD-MCNC: 8.6 G/DL (ref 14–18)
HGB BLD-MCNC: 8.7 G/DL (ref 14–18)
HGB BLD-MCNC: 8.7 G/DL (ref 14–18)
HGB BLD-MCNC: 8.9 G/DL (ref 14–18)
HGB BLD-MCNC: 9.1 G/DL (ref 14–18)
HGB BLD-MCNC: 9.1 G/DL (ref 14–18)
HGB BLD-MCNC: 9.2 G/DL (ref 14–18)
HGB BLD-MCNC: 9.2 G/DL (ref 14–18)
HGB BLD-MCNC: 9.4 G/DL (ref 14–18)
HGB BLD-MCNC: 9.5 G/DL (ref 14–18)
HGB BLD-MCNC: 9.7 G/DL (ref 14–18)
HGB BLD-MCNC: 9.8 G/DL (ref 14–18)
HGB BLD-MCNC: 9.9 G/DL (ref 14–18)
HGB BLD-MCNC: 9.9 G/DL (ref 14–18)
HYPOCHROMIA BLD QL SMEAR: ABNORMAL
IMM GRANULOCYTES # BLD AUTO: 0.01 K/UL (ref 0–0.04)
IMM GRANULOCYTES # BLD AUTO: 0.02 K/UL (ref 0–0.04)
IMM GRANULOCYTES # BLD AUTO: 0.03 K/UL (ref 0–0.04)
IMM GRANULOCYTES # BLD AUTO: 0.04 K/UL (ref 0–0.04)
IMM GRANULOCYTES # BLD AUTO: 0.05 K/UL (ref 0–0.04)
IMM GRANULOCYTES # BLD AUTO: 0.05 K/UL (ref 0–0.04)
IMM GRANULOCYTES # BLD AUTO: 0.06 K/UL (ref 0–0.04)
IMM GRANULOCYTES # BLD AUTO: 0.07 K/UL (ref 0–0.04)
IMM GRANULOCYTES # BLD AUTO: 0.08 K/UL (ref 0–0.04)
IMM GRANULOCYTES # BLD AUTO: 0.08 K/UL (ref 0–0.04)
IMM GRANULOCYTES # BLD AUTO: 0.09 K/UL (ref 0–0.04)
IMM GRANULOCYTES # BLD AUTO: 0.11 K/UL (ref 0–0.04)
IMM GRANULOCYTES # BLD AUTO: 0.11 K/UL (ref 0–0.04)
IMM GRANULOCYTES # BLD AUTO: 0.12 K/UL (ref 0–0.04)
IMM GRANULOCYTES # BLD AUTO: 0.13 K/UL (ref 0–0.04)
IMM GRANULOCYTES # BLD AUTO: 0.17 K/UL (ref 0–0.04)
IMM GRANULOCYTES # BLD AUTO: 0.18 K/UL (ref 0–0.04)
IMM GRANULOCYTES # BLD AUTO: 0.21 K/UL (ref 0–0.04)
IMM GRANULOCYTES NFR BLD AUTO: 0.1 % (ref 0–0.5)
IMM GRANULOCYTES NFR BLD AUTO: 0.1 % (ref 0–0.5)
IMM GRANULOCYTES NFR BLD AUTO: 0.2 % (ref 0–0.5)
IMM GRANULOCYTES NFR BLD AUTO: 0.3 % (ref 0–0.5)
IMM GRANULOCYTES NFR BLD AUTO: 0.4 % (ref 0–0.5)
IMM GRANULOCYTES NFR BLD AUTO: 0.5 % (ref 0–0.5)
IMM GRANULOCYTES NFR BLD AUTO: 0.6 % (ref 0–0.5)
IMM GRANULOCYTES NFR BLD AUTO: 0.8 % (ref 0–0.5)
IMM GRANULOCYTES NFR BLD AUTO: 0.9 % (ref 0–0.5)
IMM GRANULOCYTES NFR BLD AUTO: 1 % (ref 0–0.5)
IMM GRANULOCYTES NFR BLD AUTO: 1 % (ref 0–0.5)
IMM GRANULOCYTES NFR BLD AUTO: 1.1 % (ref 0–0.5)
IMM GRANULOCYTES NFR BLD AUTO: 1.1 % (ref 0–0.5)
IMM GRANULOCYTES NFR BLD AUTO: 1.2 % (ref 0–0.5)
IMM GRANULOCYTES NFR BLD AUTO: 1.5 % (ref 0–0.5)
IMM GRANULOCYTES NFR BLD AUTO: 1.5 % (ref 0–0.5)
IMM GRANULOCYTES NFR BLD AUTO: 1.7 % (ref 0–0.5)
IMM GRANULOCYTES NFR BLD AUTO: 1.8 % (ref 0–0.5)
IMM GRANULOCYTES NFR BLD AUTO: 1.9 % (ref 0–0.5)
IMM GRANULOCYTES NFR BLD AUTO: 2.4 % (ref 0–0.5)
INR PPP: 1 (ref 0.8–1.2)
INR PPP: 1.1 (ref 0.8–1.2)
IRON SERPL-MCNC: 23 UG/DL (ref 45–160)
LACTATE SERPL-SCNC: 0.7 MMOL/L (ref 0.5–2.2)
LACTATE SERPL-SCNC: 1 MMOL/L (ref 0.5–2.2)
LACTATE SERPL-SCNC: 2 MMOL/L (ref 0.5–2.2)
LACTATE SERPL-SCNC: 2.6 MMOL/L (ref 0.5–2.2)
LDH SERPL L TO P-CCNC: 196 U/L (ref 110–260)
LIPASE SERPL-CCNC: 30 U/L (ref 4–60)
LIPASE SERPL-CCNC: 35 U/L (ref 4–60)
LIPASE SERPL-CCNC: 43 U/L (ref 4–60)
LIPASE SERPL-CCNC: 87 U/L (ref 4–60)
LIPASE SERPL-CCNC: 87 U/L (ref 4–60)
LYMPHOCYTES # BLD AUTO: 1 K/UL (ref 1–4.8)
LYMPHOCYTES # BLD AUTO: 1 K/UL (ref 1–4.8)
LYMPHOCYTES # BLD AUTO: 1.1 K/UL (ref 1–4.8)
LYMPHOCYTES # BLD AUTO: 1.2 K/UL (ref 1–4.8)
LYMPHOCYTES # BLD AUTO: 1.2 K/UL (ref 1–4.8)
LYMPHOCYTES # BLD AUTO: 1.3 K/UL (ref 1–4.8)
LYMPHOCYTES # BLD AUTO: 1.4 K/UL (ref 1–4.8)
LYMPHOCYTES # BLD AUTO: 1.5 K/UL (ref 1–4.8)
LYMPHOCYTES # BLD AUTO: 1.6 K/UL (ref 1–4.8)
LYMPHOCYTES # BLD AUTO: 1.7 K/UL (ref 1–4.8)
LYMPHOCYTES # BLD AUTO: 1.8 K/UL (ref 1–4.8)
LYMPHOCYTES # BLD AUTO: 1.9 K/UL (ref 1–4.8)
LYMPHOCYTES # BLD AUTO: 2 K/UL (ref 1–4.8)
LYMPHOCYTES # BLD AUTO: 2.1 K/UL (ref 1–4.8)
LYMPHOCYTES # BLD AUTO: 2.2 K/UL (ref 1–4.8)
LYMPHOCYTES # BLD AUTO: 2.2 K/UL (ref 1–4.8)
LYMPHOCYTES NFR BLD: 11.3 % (ref 18–48)
LYMPHOCYTES NFR BLD: 11.4 % (ref 18–48)
LYMPHOCYTES NFR BLD: 12.1 % (ref 18–48)
LYMPHOCYTES NFR BLD: 13.4 % (ref 18–48)
LYMPHOCYTES NFR BLD: 13.9 % (ref 18–48)
LYMPHOCYTES NFR BLD: 14.7 % (ref 18–48)
LYMPHOCYTES NFR BLD: 15 % (ref 18–48)
LYMPHOCYTES NFR BLD: 15.8 % (ref 18–48)
LYMPHOCYTES NFR BLD: 16 % (ref 18–48)
LYMPHOCYTES NFR BLD: 16.4 % (ref 18–48)
LYMPHOCYTES NFR BLD: 16.5 % (ref 18–48)
LYMPHOCYTES NFR BLD: 16.5 % (ref 18–48)
LYMPHOCYTES NFR BLD: 16.8 % (ref 18–48)
LYMPHOCYTES NFR BLD: 16.9 % (ref 18–48)
LYMPHOCYTES NFR BLD: 17.2 % (ref 18–48)
LYMPHOCYTES NFR BLD: 17.5 % (ref 18–48)
LYMPHOCYTES NFR BLD: 17.7 % (ref 18–48)
LYMPHOCYTES NFR BLD: 17.7 % (ref 18–48)
LYMPHOCYTES NFR BLD: 18.1 % (ref 18–48)
LYMPHOCYTES NFR BLD: 18.4 % (ref 18–48)
LYMPHOCYTES NFR BLD: 18.7 % (ref 18–48)
LYMPHOCYTES NFR BLD: 18.8 % (ref 18–48)
LYMPHOCYTES NFR BLD: 19 % (ref 18–48)
LYMPHOCYTES NFR BLD: 19.2 % (ref 18–48)
LYMPHOCYTES NFR BLD: 20 % (ref 18–48)
LYMPHOCYTES NFR BLD: 20.2 % (ref 18–48)
LYMPHOCYTES NFR BLD: 20.7 % (ref 18–48)
LYMPHOCYTES NFR BLD: 20.7 % (ref 18–48)
LYMPHOCYTES NFR BLD: 20.8 % (ref 18–48)
LYMPHOCYTES NFR BLD: 21 % (ref 18–48)
LYMPHOCYTES NFR BLD: 21.6 % (ref 18–48)
LYMPHOCYTES NFR BLD: 22 % (ref 18–48)
LYMPHOCYTES NFR BLD: 22.2 % (ref 18–48)
LYMPHOCYTES NFR BLD: 22.3 % (ref 18–48)
LYMPHOCYTES NFR BLD: 22.9 % (ref 18–48)
LYMPHOCYTES NFR BLD: 23 % (ref 18–48)
LYMPHOCYTES NFR BLD: 23.5 % (ref 18–48)
LYMPHOCYTES NFR BLD: 23.6 % (ref 18–48)
LYMPHOCYTES NFR BLD: 23.9 % (ref 18–48)
LYMPHOCYTES NFR BLD: 23.9 % (ref 18–48)
LYMPHOCYTES NFR BLD: 24.4 % (ref 18–48)
LYMPHOCYTES NFR BLD: 24.4 % (ref 18–48)
LYMPHOCYTES NFR BLD: 24.5 % (ref 18–48)
LYMPHOCYTES NFR BLD: 24.6 % (ref 18–48)
LYMPHOCYTES NFR BLD: 24.6 % (ref 18–48)
LYMPHOCYTES NFR BLD: 25.1 % (ref 18–48)
LYMPHOCYTES NFR BLD: 25.1 % (ref 18–48)
LYMPHOCYTES NFR BLD: 25.5 % (ref 18–48)
LYMPHOCYTES NFR BLD: 25.9 % (ref 18–48)
LYMPHOCYTES NFR BLD: 26.2 % (ref 18–48)
LYMPHOCYTES NFR BLD: 26.3 % (ref 18–48)
LYMPHOCYTES NFR BLD: 26.4 % (ref 18–48)
LYMPHOCYTES NFR BLD: 27.1 % (ref 18–48)
LYMPHOCYTES NFR BLD: 28 % (ref 18–48)
LYMPHOCYTES NFR BLD: 28.2 % (ref 18–48)
MAGNESIUM SERPL-MCNC: 1.6 MG/DL (ref 1.6–2.6)
MAGNESIUM SERPL-MCNC: 1.8 MG/DL (ref 1.6–2.6)
MAGNESIUM SERPL-MCNC: 1.9 MG/DL (ref 1.6–2.6)
MAGNESIUM SERPL-MCNC: 2 MG/DL (ref 1.6–2.6)
MAGNESIUM SERPL-MCNC: 2.1 MG/DL (ref 1.6–2.6)
MAGNESIUM SERPL-MCNC: 2.2 MG/DL (ref 1.6–2.6)
MAGNESIUM SERPL-MCNC: 2.3 MG/DL (ref 1.6–2.6)
MAGNESIUM SERPL-MCNC: 2.4 MG/DL (ref 1.6–2.6)
MCH RBC QN AUTO: 29.1 PG (ref 27–31)
MCH RBC QN AUTO: 29.2 PG (ref 27–31)
MCH RBC QN AUTO: 29.5 PG (ref 27–31)
MCH RBC QN AUTO: 29.5 PG (ref 27–31)
MCH RBC QN AUTO: 29.6 PG (ref 27–31)
MCH RBC QN AUTO: 29.7 PG (ref 27–31)
MCH RBC QN AUTO: 29.7 PG (ref 27–31)
MCH RBC QN AUTO: 29.8 PG (ref 27–31)
MCH RBC QN AUTO: 29.8 PG (ref 27–31)
MCH RBC QN AUTO: 30 PG (ref 27–31)
MCH RBC QN AUTO: 30 PG (ref 27–31)
MCH RBC QN AUTO: 30.8 PG (ref 27–31)
MCH RBC QN AUTO: 30.9 PG (ref 27–31)
MCH RBC QN AUTO: 31.1 PG (ref 27–31)
MCH RBC QN AUTO: 31.2 PG (ref 27–31)
MCH RBC QN AUTO: 31.2 PG (ref 27–31)
MCH RBC QN AUTO: 31.5 PG (ref 27–31)
MCH RBC QN AUTO: 31.7 PG (ref 27–31)
MCH RBC QN AUTO: 31.7 PG (ref 27–31)
MCH RBC QN AUTO: 31.9 PG (ref 27–31)
MCH RBC QN AUTO: 31.9 PG (ref 27–31)
MCH RBC QN AUTO: 32 PG (ref 27–31)
MCH RBC QN AUTO: 32.1 PG (ref 27–31)
MCH RBC QN AUTO: 32.2 PG (ref 27–31)
MCH RBC QN AUTO: 32.3 PG (ref 27–31)
MCH RBC QN AUTO: 32.4 PG (ref 27–31)
MCH RBC QN AUTO: 32.4 PG (ref 27–31)
MCH RBC QN AUTO: 32.5 PG (ref 27–31)
MCH RBC QN AUTO: 32.6 PG (ref 27–31)
MCH RBC QN AUTO: 32.7 PG (ref 27–31)
MCH RBC QN AUTO: 32.7 PG (ref 27–31)
MCH RBC QN AUTO: 32.8 PG (ref 27–31)
MCH RBC QN AUTO: 32.9 PG (ref 27–31)
MCH RBC QN AUTO: 33 PG (ref 27–31)
MCHC RBC AUTO-ENTMCNC: 30.3 G/DL (ref 32–36)
MCHC RBC AUTO-ENTMCNC: 30.4 G/DL (ref 32–36)
MCHC RBC AUTO-ENTMCNC: 30.6 G/DL (ref 32–36)
MCHC RBC AUTO-ENTMCNC: 30.8 G/DL (ref 32–36)
MCHC RBC AUTO-ENTMCNC: 30.9 G/DL (ref 32–36)
MCHC RBC AUTO-ENTMCNC: 30.9 G/DL (ref 32–36)
MCHC RBC AUTO-ENTMCNC: 31 G/DL (ref 32–36)
MCHC RBC AUTO-ENTMCNC: 31 G/DL (ref 32–36)
MCHC RBC AUTO-ENTMCNC: 31.1 G/DL (ref 32–36)
MCHC RBC AUTO-ENTMCNC: 31.3 G/DL (ref 32–36)
MCHC RBC AUTO-ENTMCNC: 31.4 G/DL (ref 32–36)
MCHC RBC AUTO-ENTMCNC: 31.5 G/DL (ref 32–36)
MCHC RBC AUTO-ENTMCNC: 31.6 G/DL (ref 32–36)
MCHC RBC AUTO-ENTMCNC: 31.8 G/DL (ref 32–36)
MCHC RBC AUTO-ENTMCNC: 31.9 G/DL (ref 32–36)
MCHC RBC AUTO-ENTMCNC: 32 G/DL (ref 32–36)
MCHC RBC AUTO-ENTMCNC: 32 G/DL (ref 32–36)
MCHC RBC AUTO-ENTMCNC: 32.2 G/DL (ref 32–36)
MCHC RBC AUTO-ENTMCNC: 32.3 G/DL (ref 32–36)
MCHC RBC AUTO-ENTMCNC: 32.4 G/DL (ref 32–36)
MCHC RBC AUTO-ENTMCNC: 32.5 G/DL (ref 32–36)
MCHC RBC AUTO-ENTMCNC: 32.5 G/DL (ref 32–36)
MCHC RBC AUTO-ENTMCNC: 32.6 G/DL (ref 32–36)
MCHC RBC AUTO-ENTMCNC: 32.7 G/DL (ref 32–36)
MCHC RBC AUTO-ENTMCNC: 32.7 G/DL (ref 32–36)
MCHC RBC AUTO-ENTMCNC: 32.8 G/DL (ref 32–36)
MCHC RBC AUTO-ENTMCNC: 32.9 G/DL (ref 32–36)
MCHC RBC AUTO-ENTMCNC: 32.9 G/DL (ref 32–36)
MCHC RBC AUTO-ENTMCNC: 33 G/DL (ref 32–36)
MCHC RBC AUTO-ENTMCNC: 33.1 G/DL (ref 32–36)
MCHC RBC AUTO-ENTMCNC: 33.1 G/DL (ref 32–36)
MCHC RBC AUTO-ENTMCNC: 33.3 G/DL (ref 32–36)
MCHC RBC AUTO-ENTMCNC: 33.6 G/DL (ref 32–36)
MCHC RBC AUTO-ENTMCNC: 33.7 G/DL (ref 32–36)
MCHC RBC AUTO-ENTMCNC: 34 G/DL (ref 32–36)
MCHC RBC AUTO-ENTMCNC: 34.1 G/DL (ref 32–36)
MCHC RBC AUTO-ENTMCNC: 34.1 G/DL (ref 32–36)
MCHC RBC AUTO-ENTMCNC: 34.3 G/DL (ref 32–36)
MCV RBC AUTO: 100 FL (ref 82–98)
MCV RBC AUTO: 101 FL (ref 82–98)
MCV RBC AUTO: 102 FL (ref 82–98)
MCV RBC AUTO: 102 FL (ref 82–98)
MCV RBC AUTO: 103 FL (ref 82–98)
MCV RBC AUTO: 103 FL (ref 82–98)
MCV RBC AUTO: 104 FL (ref 82–98)
MCV RBC AUTO: 105 FL (ref 82–98)
MCV RBC AUTO: 105 FL (ref 82–98)
MCV RBC AUTO: 90 FL (ref 82–98)
MCV RBC AUTO: 92 FL (ref 82–98)
MCV RBC AUTO: 93 FL (ref 82–98)
MCV RBC AUTO: 94 FL (ref 82–98)
MCV RBC AUTO: 95 FL (ref 82–98)
MCV RBC AUTO: 96 FL (ref 82–98)
MCV RBC AUTO: 97 FL (ref 82–98)
MCV RBC AUTO: 98 FL (ref 82–98)
MCV RBC AUTO: 99 FL (ref 82–98)
MONOCYTES # BLD AUTO: 0.5 K/UL (ref 0.3–1)
MONOCYTES # BLD AUTO: 0.6 K/UL (ref 0.3–1)
MONOCYTES # BLD AUTO: 0.7 K/UL (ref 0.3–1)
MONOCYTES # BLD AUTO: 0.8 K/UL (ref 0.3–1)
MONOCYTES # BLD AUTO: 0.9 K/UL (ref 0.3–1)
MONOCYTES # BLD AUTO: 1 K/UL (ref 0.3–1)
MONOCYTES # BLD AUTO: 1.1 K/UL (ref 0.3–1)
MONOCYTES # BLD AUTO: 1.8 K/UL (ref 0.3–1)
MONOCYTES NFR BLD: 10.3 % (ref 4–15)
MONOCYTES NFR BLD: 10.6 % (ref 4–15)
MONOCYTES NFR BLD: 10.6 % (ref 4–15)
MONOCYTES NFR BLD: 10.9 % (ref 4–15)
MONOCYTES NFR BLD: 11.1 % (ref 4–15)
MONOCYTES NFR BLD: 11.3 % (ref 4–15)
MONOCYTES NFR BLD: 11.3 % (ref 4–15)
MONOCYTES NFR BLD: 11.4 % (ref 4–15)
MONOCYTES NFR BLD: 11.6 % (ref 4–15)
MONOCYTES NFR BLD: 11.6 % (ref 4–15)
MONOCYTES NFR BLD: 12 % (ref 4–15)
MONOCYTES NFR BLD: 12.1 % (ref 4–15)
MONOCYTES NFR BLD: 12.2 % (ref 4–15)
MONOCYTES NFR BLD: 12.5 % (ref 4–15)
MONOCYTES NFR BLD: 12.6 % (ref 4–15)
MONOCYTES NFR BLD: 13.3 % (ref 4–15)
MONOCYTES NFR BLD: 13.5 % (ref 4–15)
MONOCYTES NFR BLD: 14.3 % (ref 4–15)
MONOCYTES NFR BLD: 14.7 % (ref 4–15)
MONOCYTES NFR BLD: 15.1 % (ref 4–15)
MONOCYTES NFR BLD: 17.3 % (ref 4–15)
MONOCYTES NFR BLD: 5.5 % (ref 4–15)
MONOCYTES NFR BLD: 5.6 % (ref 4–15)
MONOCYTES NFR BLD: 5.9 % (ref 4–15)
MONOCYTES NFR BLD: 6.7 % (ref 4–15)
MONOCYTES NFR BLD: 7 % (ref 4–15)
MONOCYTES NFR BLD: 7.7 % (ref 4–15)
MONOCYTES NFR BLD: 7.7 % (ref 4–15)
MONOCYTES NFR BLD: 7.8 % (ref 4–15)
MONOCYTES NFR BLD: 7.8 % (ref 4–15)
MONOCYTES NFR BLD: 7.9 % (ref 4–15)
MONOCYTES NFR BLD: 7.9 % (ref 4–15)
MONOCYTES NFR BLD: 8 % (ref 4–15)
MONOCYTES NFR BLD: 8.1 % (ref 4–15)
MONOCYTES NFR BLD: 8.1 % (ref 4–15)
MONOCYTES NFR BLD: 8.2 % (ref 4–15)
MONOCYTES NFR BLD: 8.4 % (ref 4–15)
MONOCYTES NFR BLD: 8.4 % (ref 4–15)
MONOCYTES NFR BLD: 8.5 % (ref 4–15)
MONOCYTES NFR BLD: 8.5 % (ref 4–15)
MONOCYTES NFR BLD: 8.6 % (ref 4–15)
MONOCYTES NFR BLD: 8.7 % (ref 4–15)
MONOCYTES NFR BLD: 8.8 % (ref 4–15)
MONOCYTES NFR BLD: 9.1 % (ref 4–15)
MONOCYTES NFR BLD: 9.2 % (ref 4–15)
MONOCYTES NFR BLD: 9.3 % (ref 4–15)
MONOCYTES NFR BLD: 9.3 % (ref 4–15)
MONOCYTES NFR BLD: 9.4 % (ref 4–15)
MONOCYTES NFR BLD: 9.5 % (ref 4–15)
MONOCYTES NFR BLD: 9.5 % (ref 4–15)
MONOCYTES NFR BLD: 9.6 % (ref 4–15)
MONOCYTES NFR BLD: 9.7 % (ref 4–15)
MONOCYTES NFR BLD: 9.8 % (ref 4–15)
NEUTROPHILS # BLD AUTO: 10.1 K/UL (ref 1.8–7.7)
NEUTROPHILS # BLD AUTO: 10.3 K/UL (ref 1.8–7.7)
NEUTROPHILS # BLD AUTO: 2.7 K/UL (ref 1.8–7.7)
NEUTROPHILS # BLD AUTO: 2.7 K/UL (ref 1.8–7.7)
NEUTROPHILS # BLD AUTO: 2.9 K/UL (ref 1.8–7.7)
NEUTROPHILS # BLD AUTO: 3 K/UL (ref 1.8–7.7)
NEUTROPHILS # BLD AUTO: 3.1 K/UL (ref 1.8–7.7)
NEUTROPHILS # BLD AUTO: 3.3 K/UL (ref 1.8–7.7)
NEUTROPHILS # BLD AUTO: 3.3 K/UL (ref 1.8–7.7)
NEUTROPHILS # BLD AUTO: 3.5 K/UL (ref 1.8–7.7)
NEUTROPHILS # BLD AUTO: 3.6 K/UL (ref 1.8–7.7)
NEUTROPHILS # BLD AUTO: 3.7 K/UL (ref 1.8–7.7)
NEUTROPHILS # BLD AUTO: 3.7 K/UL (ref 1.8–7.7)
NEUTROPHILS # BLD AUTO: 3.8 K/UL (ref 1.8–7.7)
NEUTROPHILS # BLD AUTO: 3.9 K/UL (ref 1.8–7.7)
NEUTROPHILS # BLD AUTO: 4 K/UL (ref 1.8–7.7)
NEUTROPHILS # BLD AUTO: 4 K/UL (ref 1.8–7.7)
NEUTROPHILS # BLD AUTO: 4.1 K/UL (ref 1.8–7.7)
NEUTROPHILS # BLD AUTO: 4.3 K/UL (ref 1.8–7.7)
NEUTROPHILS # BLD AUTO: 4.3 K/UL (ref 1.8–7.7)
NEUTROPHILS # BLD AUTO: 4.4 K/UL (ref 1.8–7.7)
NEUTROPHILS # BLD AUTO: 4.7 K/UL (ref 1.8–7.7)
NEUTROPHILS # BLD AUTO: 4.7 K/UL (ref 1.8–7.7)
NEUTROPHILS # BLD AUTO: 4.9 K/UL (ref 1.8–7.7)
NEUTROPHILS # BLD AUTO: 4.9 K/UL (ref 1.8–7.7)
NEUTROPHILS # BLD AUTO: 5 K/UL (ref 1.8–7.7)
NEUTROPHILS # BLD AUTO: 5 K/UL (ref 1.8–7.7)
NEUTROPHILS # BLD AUTO: 5.1 K/UL (ref 1.8–7.7)
NEUTROPHILS # BLD AUTO: 5.1 K/UL (ref 1.8–7.7)
NEUTROPHILS # BLD AUTO: 5.2 K/UL (ref 1.8–7.7)
NEUTROPHILS # BLD AUTO: 5.4 K/UL (ref 1.8–7.7)
NEUTROPHILS # BLD AUTO: 5.5 K/UL (ref 1.8–7.7)
NEUTROPHILS # BLD AUTO: 5.6 K/UL (ref 1.8–7.7)
NEUTROPHILS # BLD AUTO: 5.8 K/UL (ref 1.8–7.7)
NEUTROPHILS # BLD AUTO: 5.8 K/UL (ref 1.8–7.7)
NEUTROPHILS # BLD AUTO: 6.6 K/UL (ref 1.8–7.7)
NEUTROPHILS # BLD AUTO: 6.7 K/UL (ref 1.8–7.7)
NEUTROPHILS # BLD AUTO: 6.8 K/UL (ref 1.8–7.7)
NEUTROPHILS # BLD AUTO: 7.2 K/UL (ref 1.8–7.7)
NEUTROPHILS # BLD AUTO: 7.2 K/UL (ref 1.8–7.7)
NEUTROPHILS # BLD AUTO: 7.7 K/UL (ref 1.8–7.7)
NEUTROPHILS # BLD AUTO: 8.1 K/UL (ref 1.8–7.7)
NEUTROPHILS # BLD AUTO: 8.5 K/UL (ref 1.8–7.7)
NEUTROPHILS # BLD AUTO: 8.9 K/UL (ref 1.8–7.7)
NEUTROPHILS NFR BLD: 45.9 % (ref 38–73)
NEUTROPHILS NFR BLD: 50 % (ref 38–73)
NEUTROPHILS NFR BLD: 50 % (ref 38–73)
NEUTROPHILS NFR BLD: 51.5 % (ref 38–73)
NEUTROPHILS NFR BLD: 51.6 % (ref 38–73)
NEUTROPHILS NFR BLD: 53.7 % (ref 38–73)
NEUTROPHILS NFR BLD: 54 % (ref 38–73)
NEUTROPHILS NFR BLD: 54.2 % (ref 38–73)
NEUTROPHILS NFR BLD: 54.3 % (ref 38–73)
NEUTROPHILS NFR BLD: 54.6 % (ref 38–73)
NEUTROPHILS NFR BLD: 54.7 % (ref 38–73)
NEUTROPHILS NFR BLD: 55.6 % (ref 38–73)
NEUTROPHILS NFR BLD: 56.3 % (ref 38–73)
NEUTROPHILS NFR BLD: 57.1 % (ref 38–73)
NEUTROPHILS NFR BLD: 57.3 % (ref 38–73)
NEUTROPHILS NFR BLD: 58 % (ref 38–73)
NEUTROPHILS NFR BLD: 58 % (ref 38–73)
NEUTROPHILS NFR BLD: 58.4 % (ref 38–73)
NEUTROPHILS NFR BLD: 58.5 % (ref 38–73)
NEUTROPHILS NFR BLD: 58.6 % (ref 38–73)
NEUTROPHILS NFR BLD: 58.6 % (ref 38–73)
NEUTROPHILS NFR BLD: 58.7 % (ref 38–73)
NEUTROPHILS NFR BLD: 58.9 % (ref 38–73)
NEUTROPHILS NFR BLD: 59.4 % (ref 38–73)
NEUTROPHILS NFR BLD: 59.5 % (ref 38–73)
NEUTROPHILS NFR BLD: 60 % (ref 38–73)
NEUTROPHILS NFR BLD: 60.6 % (ref 38–73)
NEUTROPHILS NFR BLD: 60.7 % (ref 38–73)
NEUTROPHILS NFR BLD: 61.6 % (ref 38–73)
NEUTROPHILS NFR BLD: 62.8 % (ref 38–73)
NEUTROPHILS NFR BLD: 63 % (ref 38–73)
NEUTROPHILS NFR BLD: 63.6 % (ref 38–73)
NEUTROPHILS NFR BLD: 63.7 % (ref 38–73)
NEUTROPHILS NFR BLD: 64.9 % (ref 38–73)
NEUTROPHILS NFR BLD: 65.5 % (ref 38–73)
NEUTROPHILS NFR BLD: 65.5 % (ref 38–73)
NEUTROPHILS NFR BLD: 66.3 % (ref 38–73)
NEUTROPHILS NFR BLD: 66.6 % (ref 38–73)
NEUTROPHILS NFR BLD: 66.7 % (ref 38–73)
NEUTROPHILS NFR BLD: 66.7 % (ref 38–73)
NEUTROPHILS NFR BLD: 66.9 % (ref 38–73)
NEUTROPHILS NFR BLD: 67.1 % (ref 38–73)
NEUTROPHILS NFR BLD: 68.2 % (ref 38–73)
NEUTROPHILS NFR BLD: 68.6 % (ref 38–73)
NEUTROPHILS NFR BLD: 70.1 % (ref 38–73)
NEUTROPHILS NFR BLD: 70.5 % (ref 38–73)
NEUTROPHILS NFR BLD: 71.9 % (ref 38–73)
NEUTROPHILS NFR BLD: 72 % (ref 38–73)
NEUTROPHILS NFR BLD: 72.4 % (ref 38–73)
NEUTROPHILS NFR BLD: 73.9 % (ref 38–73)
NEUTROPHILS NFR BLD: 74.6 % (ref 38–73)
NEUTROPHILS NFR BLD: 75 % (ref 38–73)
NEUTROPHILS NFR BLD: 75.3 % (ref 38–73)
NEUTROPHILS NFR BLD: 75.7 % (ref 38–73)
NEUTROPHILS NFR BLD: 76.1 % (ref 38–73)
NEUTROPHILS NFR BLD: 79.5 % (ref 38–73)
NEUTROPHILS NFR BLD: 80.9 % (ref 38–73)
NRBC BLD-RTO: 0 /100 WBC
NRBC BLD-RTO: 1 /100 WBC
NUM UNITS TRANS PACKED RBC: NORMAL
OVALOCYTES BLD QL SMEAR: ABNORMAL
OVALOCYTES BLD QL SMEAR: ABNORMAL
PHOSPHATE SERPL-MCNC: 1.3 MG/DL (ref 2.7–4.5)
PHOSPHATE SERPL-MCNC: 1.5 MG/DL (ref 2.7–4.5)
PHOSPHATE SERPL-MCNC: 1.5 MG/DL (ref 2.7–4.5)
PHOSPHATE SERPL-MCNC: 1.6 MG/DL (ref 2.7–4.5)
PHOSPHATE SERPL-MCNC: 1.6 MG/DL (ref 2.7–4.5)
PHOSPHATE SERPL-MCNC: 1.7 MG/DL (ref 2.7–4.5)
PHOSPHATE SERPL-MCNC: 1.8 MG/DL (ref 2.7–4.5)
PHOSPHATE SERPL-MCNC: 1.8 MG/DL (ref 2.7–4.5)
PHOSPHATE SERPL-MCNC: 1.9 MG/DL (ref 2.7–4.5)
PHOSPHATE SERPL-MCNC: 2.1 MG/DL (ref 2.7–4.5)
PHOSPHATE SERPL-MCNC: 2.4 MG/DL (ref 2.7–4.5)
PHOSPHATE SERPL-MCNC: 2.6 MG/DL (ref 2.7–4.5)
PHOSPHATE SERPL-MCNC: 2.7 MG/DL (ref 2.7–4.5)
PHOSPHATE SERPL-MCNC: 2.8 MG/DL (ref 2.7–4.5)
PHOSPHATE SERPL-MCNC: 2.9 MG/DL (ref 2.7–4.5)
PHOSPHATE SERPL-MCNC: 3.6 MG/DL (ref 2.7–4.5)
PHOSPHATE SERPL-MCNC: 3.8 MG/DL (ref 2.7–4.5)
PHOSPHATE SERPL-MCNC: 3.8 MG/DL (ref 2.7–4.5)
PHOSPHATE SERPL-MCNC: 4 MG/DL (ref 2.7–4.5)
PHOSPHATE SERPL-MCNC: 4.1 MG/DL (ref 2.7–4.5)
PHOSPHATE SERPL-MCNC: 4.1 MG/DL (ref 2.7–4.5)
PHOSPHATE SERPL-MCNC: 4.5 MG/DL (ref 2.7–4.5)
PHOSPHATE SERPL-MCNC: 4.8 MG/DL (ref 2.7–4.5)
PHOSPHATE SERPL-MCNC: 5 MG/DL (ref 2.7–4.5)
PHOSPHATE SERPL-MCNC: 5.3 MG/DL (ref 2.7–4.5)
PLATELET # BLD AUTO: 149 K/UL (ref 150–350)
PLATELET # BLD AUTO: 151 K/UL (ref 150–350)
PLATELET # BLD AUTO: 152 K/UL (ref 150–350)
PLATELET # BLD AUTO: 152 K/UL (ref 150–350)
PLATELET # BLD AUTO: 157 K/UL (ref 150–350)
PLATELET # BLD AUTO: 160 K/UL (ref 150–350)
PLATELET # BLD AUTO: 161 K/UL (ref 150–350)
PLATELET # BLD AUTO: 161 K/UL (ref 150–350)
PLATELET # BLD AUTO: 163 K/UL (ref 150–350)
PLATELET # BLD AUTO: 164 K/UL (ref 150–350)
PLATELET # BLD AUTO: 167 K/UL (ref 150–350)
PLATELET # BLD AUTO: 169 K/UL (ref 150–350)
PLATELET # BLD AUTO: 171 K/UL (ref 150–350)
PLATELET # BLD AUTO: 174 K/UL (ref 150–350)
PLATELET # BLD AUTO: 176 K/UL (ref 150–350)
PLATELET # BLD AUTO: 177 K/UL (ref 150–350)
PLATELET # BLD AUTO: 177 K/UL (ref 150–350)
PLATELET # BLD AUTO: 178 K/UL (ref 150–350)
PLATELET # BLD AUTO: 183 K/UL (ref 150–350)
PLATELET # BLD AUTO: 185 K/UL (ref 150–350)
PLATELET # BLD AUTO: 185 K/UL (ref 150–350)
PLATELET # BLD AUTO: 186 K/UL (ref 150–350)
PLATELET # BLD AUTO: 187 K/UL (ref 150–350)
PLATELET # BLD AUTO: 187 K/UL (ref 150–350)
PLATELET # BLD AUTO: 188 K/UL (ref 150–350)
PLATELET # BLD AUTO: 189 K/UL (ref 150–350)
PLATELET # BLD AUTO: 192 K/UL (ref 150–350)
PLATELET # BLD AUTO: 192 K/UL (ref 150–350)
PLATELET # BLD AUTO: 193 K/UL (ref 150–350)
PLATELET # BLD AUTO: 193 K/UL (ref 150–350)
PLATELET # BLD AUTO: 194 K/UL (ref 150–350)
PLATELET # BLD AUTO: 196 K/UL (ref 150–350)
PLATELET # BLD AUTO: 199 K/UL (ref 150–350)
PLATELET # BLD AUTO: 200 K/UL (ref 150–350)
PLATELET # BLD AUTO: 208 K/UL (ref 150–350)
PLATELET # BLD AUTO: 209 K/UL (ref 150–350)
PLATELET # BLD AUTO: 209 K/UL (ref 150–350)
PLATELET # BLD AUTO: 210 K/UL (ref 150–350)
PLATELET # BLD AUTO: 211 K/UL (ref 150–350)
PLATELET # BLD AUTO: 215 K/UL (ref 150–350)
PLATELET # BLD AUTO: 216 K/UL (ref 150–350)
PLATELET # BLD AUTO: 216 K/UL (ref 150–350)
PLATELET # BLD AUTO: 218 K/UL (ref 150–350)
PLATELET # BLD AUTO: 218 K/UL (ref 150–350)
PLATELET # BLD AUTO: 230 K/UL (ref 150–350)
PLATELET # BLD AUTO: 234 K/UL (ref 150–350)
PLATELET # BLD AUTO: 245 K/UL (ref 150–350)
PLATELET # BLD AUTO: 251 K/UL (ref 150–350)
PLATELET # BLD AUTO: 251 K/UL (ref 150–350)
PLATELET # BLD AUTO: 253 K/UL (ref 150–350)
PLATELET # BLD AUTO: 262 K/UL (ref 150–350)
PLATELET # BLD AUTO: 286 K/UL (ref 150–350)
PLATELET # BLD AUTO: ABNORMAL K/UL (ref 150–350)
PLATELET BLD QL SMEAR: ABNORMAL
PMV BLD AUTO: 10 FL (ref 9.2–12.9)
PMV BLD AUTO: 10.1 FL (ref 9.2–12.9)
PMV BLD AUTO: 10.3 FL (ref 9.2–12.9)
PMV BLD AUTO: 10.4 FL (ref 9.2–12.9)
PMV BLD AUTO: 10.5 FL (ref 9.2–12.9)
PMV BLD AUTO: 10.5 FL (ref 9.2–12.9)
PMV BLD AUTO: 10.6 FL (ref 9.2–12.9)
PMV BLD AUTO: 10.7 FL (ref 9.2–12.9)
PMV BLD AUTO: 10.8 FL (ref 9.2–12.9)
PMV BLD AUTO: 10.9 FL (ref 9.2–12.9)
PMV BLD AUTO: 11 FL (ref 9.2–12.9)
PMV BLD AUTO: 11.1 FL (ref 9.2–12.9)
PMV BLD AUTO: 11.1 FL (ref 9.2–12.9)
PMV BLD AUTO: 11.2 FL (ref 9.2–12.9)
PMV BLD AUTO: 11.3 FL (ref 9.2–12.9)
PMV BLD AUTO: 11.4 FL (ref 9.2–12.9)
PMV BLD AUTO: 11.5 FL (ref 9.2–12.9)
PMV BLD AUTO: 11.7 FL (ref 9.2–12.9)
PMV BLD AUTO: 11.8 FL (ref 9.2–12.9)
PMV BLD AUTO: 12.1 FL (ref 9.2–12.9)
PMV BLD AUTO: 12.2 FL (ref 9.2–12.9)
PMV BLD AUTO: 12.6 FL (ref 9.2–12.9)
PMV BLD AUTO: 12.9 FL (ref 9.2–12.9)
PMV BLD AUTO: ABNORMAL FL (ref 9.2–12.9)
POC IONIZED CALCIUM: 0.97 MMOL/L (ref 1.06–1.42)
POC IONIZED CALCIUM: 1.06 MMOL/L (ref 1.06–1.42)
POC IONIZED CALCIUM: 1.12 MMOL/L (ref 1.06–1.42)
POC TCO2 (MEASURED): 30 MMOL/L (ref 23–29)
POC TCO2 (MEASURED): 32 MMOL/L (ref 23–29)
POC TCO2 (MEASURED): 37 MMOL/L (ref 23–29)
POCT GLUCOSE: 82 MG/DL (ref 70–110)
POCT GLUCOSE: 85 MG/DL (ref 70–110)
POCT GLUCOSE: 87 MG/DL (ref 70–110)
POCT GLUCOSE: 92 MG/DL (ref 70–110)
POCT GLUCOSE: 99 MG/DL (ref 70–110)
POIKILOCYTOSIS BLD QL SMEAR: SLIGHT
POIKILOCYTOSIS BLD QL SMEAR: SLIGHT
POLYCHROMASIA BLD QL SMEAR: ABNORMAL
POTASSIUM BLD-SCNC: 2.8 MMOL/L (ref 3.5–5.1)
POTASSIUM BLD-SCNC: 3.3 MMOL/L (ref 3.5–5.1)
POTASSIUM BLD-SCNC: 4.3 MMOL/L (ref 3.5–5.1)
POTASSIUM SERPL-SCNC: 2.7 MMOL/L (ref 3.5–5.1)
POTASSIUM SERPL-SCNC: 2.9 MMOL/L (ref 3.5–5.1)
POTASSIUM SERPL-SCNC: 2.9 MMOL/L (ref 3.5–5.1)
POTASSIUM SERPL-SCNC: 3.2 MMOL/L (ref 3.5–5.1)
POTASSIUM SERPL-SCNC: 3.2 MMOL/L (ref 3.5–5.1)
POTASSIUM SERPL-SCNC: 3.3 MMOL/L (ref 3.5–5.1)
POTASSIUM SERPL-SCNC: 3.4 MMOL/L (ref 3.5–5.1)
POTASSIUM SERPL-SCNC: 3.5 MMOL/L (ref 3.5–5.1)
POTASSIUM SERPL-SCNC: 3.7 MMOL/L (ref 3.5–5.1)
POTASSIUM SERPL-SCNC: 3.8 MMOL/L (ref 3.5–5.1)
POTASSIUM SERPL-SCNC: 3.8 MMOL/L (ref 3.5–5.1)
POTASSIUM SERPL-SCNC: 3.9 MMOL/L (ref 3.5–5.1)
POTASSIUM SERPL-SCNC: 4 MMOL/L (ref 3.5–5.1)
POTASSIUM SERPL-SCNC: 4.1 MMOL/L (ref 3.5–5.1)
POTASSIUM SERPL-SCNC: 4.1 MMOL/L (ref 3.5–5.1)
POTASSIUM SERPL-SCNC: 4.2 MMOL/L (ref 3.5–5.1)
POTASSIUM SERPL-SCNC: 4.3 MMOL/L (ref 3.5–5.1)
POTASSIUM SERPL-SCNC: 4.4 MMOL/L (ref 3.5–5.1)
POTASSIUM SERPL-SCNC: 4.5 MMOL/L (ref 3.5–5.1)
POTASSIUM SERPL-SCNC: 4.6 MMOL/L (ref 3.5–5.1)
POTASSIUM SERPL-SCNC: 4.9 MMOL/L (ref 3.5–5.1)
POTASSIUM SERPL-SCNC: 5.1 MMOL/L (ref 3.5–5.1)
PROT SERPL-MCNC: 5.1 G/DL (ref 6–8.4)
PROT SERPL-MCNC: 5.2 G/DL (ref 6–8.4)
PROT SERPL-MCNC: 5.3 G/DL (ref 6–8.4)
PROT SERPL-MCNC: 5.3 G/DL (ref 6–8.4)
PROT SERPL-MCNC: 5.4 G/DL (ref 6–8.4)
PROT SERPL-MCNC: 5.6 G/DL (ref 6–8.4)
PROT SERPL-MCNC: 5.7 G/DL (ref 6–8.4)
PROT SERPL-MCNC: 6 G/DL (ref 6–8.4)
PROT SERPL-MCNC: 6.1 G/DL (ref 6–8.4)
PROT SERPL-MCNC: 6.3 G/DL (ref 6–8.4)
PROT SERPL-MCNC: 6.3 G/DL (ref 6–8.4)
PROT SERPL-MCNC: 6.5 G/DL (ref 6–8.4)
PROT SERPL-MCNC: 7.4 G/DL (ref 6–8.4)
PROT SERPL-MCNC: 7.5 G/DL (ref 6–8.4)
PROT SERPL-MCNC: 7.6 G/DL (ref 6–8.4)
PROT SERPL-MCNC: 7.7 G/DL (ref 6–8.4)
PROT SERPL-MCNC: 7.9 G/DL (ref 6–8.4)
PROT SERPL-MCNC: 7.9 G/DL (ref 6–8.4)
PROT SERPL-MCNC: 8 G/DL (ref 6–8.4)
PROT SERPL-MCNC: 8 G/DL (ref 6–8.4)
PROT SERPL-MCNC: 8.2 G/DL (ref 6–8.4)
PROT SERPL-MCNC: 8.3 G/DL (ref 6–8.4)
PROT SERPL-MCNC: 8.4 G/DL (ref 6–8.4)
PROT SERPL-MCNC: 8.4 G/DL (ref 6–8.4)
PROT SERPL-MCNC: 8.5 G/DL (ref 6–8.4)
PROT SERPL-MCNC: 8.6 G/DL (ref 6–8.4)
PROT SERPL-MCNC: 8.8 G/DL (ref 6–8.4)
PROT SERPL-MCNC: 9 G/DL (ref 6–8.4)
PROTHROMBIN TIME: 10.6 SEC (ref 9–12.5)
PROTHROMBIN TIME: 10.6 SEC (ref 9–12.5)
PROTHROMBIN TIME: 10.7 SEC (ref 9–12.5)
PROTHROMBIN TIME: 10.8 SEC (ref 9–12.5)
PROTHROMBIN TIME: 10.8 SEC (ref 9–12.5)
PROTHROMBIN TIME: 10.9 SEC (ref 9–12.5)
PROTHROMBIN TIME: 10.9 SEC (ref 9–12.5)
PROTHROMBIN TIME: 11 SEC (ref 9–12.5)
PROTHROMBIN TIME: 11.1 SEC (ref 9–12.5)
PROTHROMBIN TIME: 11.3 SEC (ref 9–12.5)
PROTHROMBIN TIME: 11.4 SEC (ref 9–12.5)
PROTHROMBIN TIME: 11.4 SEC (ref 9–12.5)
PTH-INTACT SERPL-MCNC: 716 PG/ML (ref 9–77)
RBC # BLD AUTO: 1.89 M/UL (ref 4.6–6.2)
RBC # BLD AUTO: 1.93 M/UL (ref 4.6–6.2)
RBC # BLD AUTO: 1.95 M/UL (ref 4.6–6.2)
RBC # BLD AUTO: 2.04 M/UL (ref 4.6–6.2)
RBC # BLD AUTO: 2.07 M/UL (ref 4.6–6.2)
RBC # BLD AUTO: 2.15 M/UL (ref 4.6–6.2)
RBC # BLD AUTO: 2.15 M/UL (ref 4.6–6.2)
RBC # BLD AUTO: 2.24 M/UL (ref 4.6–6.2)
RBC # BLD AUTO: 2.27 M/UL (ref 4.6–6.2)
RBC # BLD AUTO: 2.31 M/UL (ref 4.6–6.2)
RBC # BLD AUTO: 2.33 M/UL (ref 4.6–6.2)
RBC # BLD AUTO: 2.36 M/UL (ref 4.6–6.2)
RBC # BLD AUTO: 2.46 M/UL (ref 4.6–6.2)
RBC # BLD AUTO: 2.47 M/UL (ref 4.6–6.2)
RBC # BLD AUTO: 2.47 M/UL (ref 4.6–6.2)
RBC # BLD AUTO: 2.51 M/UL (ref 4.6–6.2)
RBC # BLD AUTO: 2.55 M/UL (ref 4.6–6.2)
RBC # BLD AUTO: 2.55 M/UL (ref 4.6–6.2)
RBC # BLD AUTO: 2.57 M/UL (ref 4.6–6.2)
RBC # BLD AUTO: 2.62 M/UL (ref 4.6–6.2)
RBC # BLD AUTO: 2.63 M/UL (ref 4.6–6.2)
RBC # BLD AUTO: 2.64 M/UL (ref 4.6–6.2)
RBC # BLD AUTO: 2.64 M/UL (ref 4.6–6.2)
RBC # BLD AUTO: 2.67 M/UL (ref 4.6–6.2)
RBC # BLD AUTO: 2.75 M/UL (ref 4.6–6.2)
RBC # BLD AUTO: 2.8 M/UL (ref 4.6–6.2)
RBC # BLD AUTO: 2.85 M/UL (ref 4.6–6.2)
RBC # BLD AUTO: 2.86 M/UL (ref 4.6–6.2)
RBC # BLD AUTO: 2.86 M/UL (ref 4.6–6.2)
RBC # BLD AUTO: 2.89 M/UL (ref 4.6–6.2)
RBC # BLD AUTO: 2.96 M/UL (ref 4.6–6.2)
RBC # BLD AUTO: 3 M/UL (ref 4.6–6.2)
RBC # BLD AUTO: 3 M/UL (ref 4.6–6.2)
RBC # BLD AUTO: 3.09 M/UL (ref 4.6–6.2)
RBC # BLD AUTO: 3.09 M/UL (ref 4.6–6.2)
RBC # BLD AUTO: 3.1 M/UL (ref 4.6–6.2)
RBC # BLD AUTO: 3.15 M/UL (ref 4.6–6.2)
RBC # BLD AUTO: 3.16 M/UL (ref 4.6–6.2)
RBC # BLD AUTO: 3.17 M/UL (ref 4.6–6.2)
RBC # BLD AUTO: 3.17 M/UL (ref 4.6–6.2)
RBC # BLD AUTO: 3.19 M/UL (ref 4.6–6.2)
RBC # BLD AUTO: 3.2 M/UL (ref 4.6–6.2)
RBC # BLD AUTO: 3.24 M/UL (ref 4.6–6.2)
RBC # BLD AUTO: 3.34 M/UL (ref 4.6–6.2)
RBC # BLD AUTO: 3.57 M/UL (ref 4.6–6.2)
RBC # BLD AUTO: 3.59 M/UL (ref 4.6–6.2)
RBC # BLD AUTO: 3.69 M/UL (ref 4.6–6.2)
RBC # BLD AUTO: 3.75 M/UL (ref 4.6–6.2)
RBC # BLD AUTO: 3.76 M/UL (ref 4.6–6.2)
RBC # BLD AUTO: 3.77 M/UL (ref 4.6–6.2)
RBC # BLD AUTO: 3.79 M/UL (ref 4.6–6.2)
RBC # BLD AUTO: 3.87 M/UL (ref 4.6–6.2)
RBC # BLD AUTO: 3.89 M/UL (ref 4.6–6.2)
RBC # BLD AUTO: 3.94 M/UL (ref 4.6–6.2)
RBC # BLD AUTO: 4.06 M/UL (ref 4.6–6.2)
RBC # BLD AUTO: 4.16 M/UL (ref 4.6–6.2)
RBC # BLD AUTO: 4.47 M/UL (ref 4.6–6.2)
RETICS/RBC NFR AUTO: 3 % (ref 0.4–2)
SAMPLE: ABNORMAL
SATURATED IRON: 13 % (ref 20–50)
SODIUM BLD-SCNC: 139 MMOL/L (ref 136–145)
SODIUM BLD-SCNC: 141 MMOL/L (ref 136–145)
SODIUM BLD-SCNC: 145 MMOL/L (ref 136–145)
SODIUM SERPL-SCNC: 135 MMOL/L (ref 136–145)
SODIUM SERPL-SCNC: 136 MMOL/L (ref 136–145)
SODIUM SERPL-SCNC: 137 MMOL/L (ref 136–145)
SODIUM SERPL-SCNC: 137 MMOL/L (ref 136–145)
SODIUM SERPL-SCNC: 138 MMOL/L (ref 136–145)
SODIUM SERPL-SCNC: 139 MMOL/L (ref 136–145)
SODIUM SERPL-SCNC: 140 MMOL/L (ref 136–145)
SODIUM SERPL-SCNC: 141 MMOL/L (ref 136–145)
SODIUM SERPL-SCNC: 142 MMOL/L (ref 136–145)
SODIUM SERPL-SCNC: 143 MMOL/L (ref 136–145)
SODIUM SERPL-SCNC: 144 MMOL/L (ref 136–145)
SODIUM SERPL-SCNC: 145 MMOL/L (ref 136–145)
TARGETS BLD QL SMEAR: ABNORMAL
TARGETS BLD QL SMEAR: ABNORMAL
TB INDURATION 48 - 72 HR READ: 0 MM
TOTAL IRON BINDING CAPACITY: 178 UG/DL (ref 250–450)
TRANS ERYTHROCYTES VOL PATIENT: NORMAL ML
TRANSFERRIN SERPL-MCNC: 120 MG/DL (ref 200–375)
TROPONIN I SERPL DL<=0.01 NG/ML-MCNC: 0.07 NG/ML (ref 0–0.03)
TROPONIN I SERPL DL<=0.01 NG/ML-MCNC: 0.12 NG/ML (ref 0–0.03)
TROPONIN I SERPL DL<=0.01 NG/ML-MCNC: 0.13 NG/ML (ref 0–0.03)
TROPONIN I SERPL DL<=0.01 NG/ML-MCNC: 0.14 NG/ML (ref 0–0.03)
TROPONIN I SERPL DL<=0.01 NG/ML-MCNC: 0.15 NG/ML (ref 0–0.03)
TROPONIN I SERPL DL<=0.01 NG/ML-MCNC: 0.17 NG/ML (ref 0–0.03)
TROPONIN I SERPL DL<=0.01 NG/ML-MCNC: 0.17 NG/ML (ref 0–0.03)
TROPONIN I SERPL DL<=0.01 NG/ML-MCNC: 0.23 NG/ML (ref 0–0.03)
TSH SERPL DL<=0.005 MIU/L-ACNC: 0.7 UIU/ML (ref 0.4–4)
VANCOMYCIN SERPL-MCNC: 21.3 UG/ML
WBC # BLD AUTO: 10.16 K/UL (ref 3.9–12.7)
WBC # BLD AUTO: 10.85 K/UL (ref 3.9–12.7)
WBC # BLD AUTO: 10.94 K/UL (ref 3.9–12.7)
WBC # BLD AUTO: 11.45 K/UL (ref 3.9–12.7)
WBC # BLD AUTO: 12.41 K/UL (ref 3.9–12.7)
WBC # BLD AUTO: 12.93 K/UL (ref 3.9–12.7)
WBC # BLD AUTO: 13.98 K/UL (ref 3.9–12.7)
WBC # BLD AUTO: 5.25 K/UL (ref 3.9–12.7)
WBC # BLD AUTO: 5.33 K/UL (ref 3.9–12.7)
WBC # BLD AUTO: 5.57 K/UL (ref 3.9–12.7)
WBC # BLD AUTO: 5.6 K/UL (ref 3.9–12.7)
WBC # BLD AUTO: 5.62 K/UL (ref 3.9–12.7)
WBC # BLD AUTO: 5.65 K/UL (ref 3.9–12.7)
WBC # BLD AUTO: 5.79 K/UL (ref 3.9–12.7)
WBC # BLD AUTO: 5.87 K/UL (ref 3.9–12.7)
WBC # BLD AUTO: 5.89 K/UL (ref 3.9–12.7)
WBC # BLD AUTO: 5.93 K/UL (ref 3.9–12.7)
WBC # BLD AUTO: 6.03 K/UL (ref 3.9–12.7)
WBC # BLD AUTO: 6.03 K/UL (ref 3.9–12.7)
WBC # BLD AUTO: 6.09 K/UL (ref 3.9–12.7)
WBC # BLD AUTO: 6.11 K/UL (ref 3.9–12.7)
WBC # BLD AUTO: 6.18 K/UL (ref 3.9–12.7)
WBC # BLD AUTO: 6.21 K/UL (ref 3.9–12.7)
WBC # BLD AUTO: 6.48 K/UL (ref 3.9–12.7)
WBC # BLD AUTO: 6.58 K/UL (ref 3.9–12.7)
WBC # BLD AUTO: 6.82 K/UL (ref 3.9–12.7)
WBC # BLD AUTO: 6.94 K/UL (ref 3.9–12.7)
WBC # BLD AUTO: 7.01 K/UL (ref 3.9–12.7)
WBC # BLD AUTO: 7.04 K/UL (ref 3.9–12.7)
WBC # BLD AUTO: 7.11 K/UL (ref 3.9–12.7)
WBC # BLD AUTO: 7.15 K/UL (ref 3.9–12.7)
WBC # BLD AUTO: 7.2 K/UL (ref 3.9–12.7)
WBC # BLD AUTO: 7.21 K/UL (ref 3.9–12.7)
WBC # BLD AUTO: 7.31 K/UL (ref 3.9–12.7)
WBC # BLD AUTO: 7.36 K/UL (ref 3.9–12.7)
WBC # BLD AUTO: 7.41 K/UL (ref 3.9–12.7)
WBC # BLD AUTO: 7.41 K/UL (ref 3.9–12.7)
WBC # BLD AUTO: 7.52 K/UL (ref 3.9–12.7)
WBC # BLD AUTO: 7.55 K/UL (ref 3.9–12.7)
WBC # BLD AUTO: 7.59 K/UL (ref 3.9–12.7)
WBC # BLD AUTO: 7.68 K/UL (ref 3.9–12.7)
WBC # BLD AUTO: 7.84 K/UL (ref 3.9–12.7)
WBC # BLD AUTO: 7.85 K/UL (ref 3.9–12.7)
WBC # BLD AUTO: 7.92 K/UL (ref 3.9–12.7)
WBC # BLD AUTO: 8.03 K/UL (ref 3.9–12.7)
WBC # BLD AUTO: 8.2 K/UL (ref 3.9–12.7)
WBC # BLD AUTO: 8.36 K/UL (ref 3.9–12.7)
WBC # BLD AUTO: 8.36 K/UL (ref 3.9–12.7)
WBC # BLD AUTO: 8.62 K/UL (ref 3.9–12.7)
WBC # BLD AUTO: 8.75 K/UL (ref 3.9–12.7)
WBC # BLD AUTO: 8.94 K/UL (ref 3.9–12.7)
WBC # BLD AUTO: 9.16 K/UL (ref 3.9–12.7)
WBC # BLD AUTO: 9.5 K/UL (ref 3.9–12.7)
WBC # BLD AUTO: 9.57 K/UL (ref 3.9–12.7)
WBC # BLD AUTO: 9.58 K/UL (ref 3.9–12.7)
WBC # BLD AUTO: 9.66 K/UL (ref 3.9–12.7)
WBC # BLD AUTO: 9.86 K/UL (ref 3.9–12.7)

## 2019-01-01 PROCEDURE — 63600175 PHARM REV CODE 636 W HCPCS: Performed by: PHYSICIAN ASSISTANT

## 2019-01-01 PROCEDURE — 84100 ASSAY OF PHOSPHORUS: CPT

## 2019-01-01 PROCEDURE — 80053 COMPREHEN METABOLIC PANEL: CPT

## 2019-01-01 PROCEDURE — D9220A PRA ANESTHESIA: ICD-10-PCS | Mod: CRNA,,, | Performed by: NURSE ANESTHETIST, CERTIFIED REGISTERED

## 2019-01-01 PROCEDURE — 85610 PROTHROMBIN TIME: CPT

## 2019-01-01 PROCEDURE — 25000003 PHARM REV CODE 250: Performed by: STUDENT IN AN ORGANIZED HEALTH CARE EDUCATION/TRAINING PROGRAM

## 2019-01-01 PROCEDURE — 99232 PR SUBSEQUENT HOSPITAL CARE,LEVL II: ICD-10-PCS | Mod: GC,,, | Performed by: STUDENT IN AN ORGANIZED HEALTH CARE EDUCATION/TRAINING PROGRAM

## 2019-01-01 PROCEDURE — 93010 EKG 12-LEAD: ICD-10-PCS | Mod: ,,, | Performed by: INTERNAL MEDICINE

## 2019-01-01 PROCEDURE — 36430 TRANSFUSION BLD/BLD COMPNT: CPT

## 2019-01-01 PROCEDURE — 99223 1ST HOSP IP/OBS HIGH 75: CPT | Mod: 25,,, | Performed by: NURSE PRACTITIONER

## 2019-01-01 PROCEDURE — G0378 HOSPITAL OBSERVATION PER HR: HCPCS

## 2019-01-01 PROCEDURE — 85025 COMPLETE CBC W/AUTO DIFF WBC: CPT

## 2019-01-01 PROCEDURE — P9016 RBC LEUKOCYTES REDUCED: HCPCS

## 2019-01-01 PROCEDURE — 83735 ASSAY OF MAGNESIUM: CPT

## 2019-01-01 PROCEDURE — 83605 ASSAY OF LACTIC ACID: CPT

## 2019-01-01 PROCEDURE — 99285 EMERGENCY DEPT VISIT HI MDM: CPT | Mod: ,,, | Performed by: EMERGENCY MEDICINE

## 2019-01-01 PROCEDURE — 99217 PR OBSERVATION CARE DISCHARGE: CPT | Mod: ,,, | Performed by: HOSPITALIST

## 2019-01-01 PROCEDURE — 90935 HEMODIALYSIS ONE EVALUATION: CPT | Mod: ,,, | Performed by: INTERNAL MEDICINE

## 2019-01-01 PROCEDURE — 99217 PR OBSERVATION CARE DISCHARGE: ICD-10-PCS | Mod: ,,, | Performed by: HOSPITALIST

## 2019-01-01 PROCEDURE — 90935 HEMODIALYSIS ONE EVALUATION: CPT

## 2019-01-01 PROCEDURE — 93005 ELECTROCARDIOGRAM TRACING: CPT

## 2019-01-01 PROCEDURE — D9220A PRA ANESTHESIA: Mod: ANES,,, | Performed by: ANESTHESIOLOGY

## 2019-01-01 PROCEDURE — 63600175 PHARM REV CODE 636 W HCPCS: Performed by: HOSPITALIST

## 2019-01-01 PROCEDURE — 80048 BASIC METABOLIC PNL TOTAL CA: CPT

## 2019-01-01 PROCEDURE — 27201040 HC RC 50 FILTER

## 2019-01-01 PROCEDURE — 63600175 PHARM REV CODE 636 W HCPCS: Performed by: INTERNAL MEDICINE

## 2019-01-01 PROCEDURE — 86901 BLOOD TYPING SEROLOGIC RH(D): CPT

## 2019-01-01 PROCEDURE — 20600001 HC STEP DOWN PRIVATE ROOM

## 2019-01-01 PROCEDURE — 85018 HEMOGLOBIN: CPT

## 2019-01-01 PROCEDURE — 80100016 HC MAINTENANCE HEMODIALYSIS

## 2019-01-01 PROCEDURE — 99232 PR SUBSEQUENT HOSPITAL CARE,LEVL II: ICD-10-PCS | Mod: ,,, | Performed by: HOSPITALIST

## 2019-01-01 PROCEDURE — 25000003 PHARM REV CODE 250: Performed by: HOSPITALIST

## 2019-01-01 PROCEDURE — 96375 TX/PRO/DX INJ NEW DRUG ADDON: CPT

## 2019-01-01 PROCEDURE — 63600175 PHARM REV CODE 636 W HCPCS: Performed by: NURSE ANESTHETIST, CERTIFIED REGISTERED

## 2019-01-01 PROCEDURE — 20000000 HC ICU ROOM

## 2019-01-01 PROCEDURE — 84484 ASSAY OF TROPONIN QUANT: CPT

## 2019-01-01 PROCEDURE — 11000001 HC ACUTE MED/SURG PRIVATE ROOM

## 2019-01-01 PROCEDURE — 83690 ASSAY OF LIPASE: CPT

## 2019-01-01 PROCEDURE — 85045 AUTOMATED RETICULOCYTE COUNT: CPT

## 2019-01-01 PROCEDURE — C9113 INJ PANTOPRAZOLE SODIUM, VIA: HCPCS | Performed by: STUDENT IN AN ORGANIZED HEALTH CARE EDUCATION/TRAINING PROGRAM

## 2019-01-01 PROCEDURE — 99226 PR SUBSEQUENT OBSERVATION CARE,LEVEL III: ICD-10-PCS | Mod: ,,, | Performed by: HOSPITALIST

## 2019-01-01 PROCEDURE — 25000003 PHARM REV CODE 250: Performed by: PHYSICIAN ASSISTANT

## 2019-01-01 PROCEDURE — 99285 PR EMERGENCY DEPT VISIT,LEVEL V: ICD-10-PCS | Mod: ,,, | Performed by: EMERGENCY MEDICINE

## 2019-01-01 PROCEDURE — 99284 PR EMERGENCY DEPT VISIT,LEVEL IV: ICD-10-PCS | Mod: ,,, | Performed by: INTERNAL MEDICINE

## 2019-01-01 PROCEDURE — 63600175 PHARM REV CODE 636 W HCPCS: Performed by: STUDENT IN AN ORGANIZED HEALTH CARE EDUCATION/TRAINING PROGRAM

## 2019-01-01 PROCEDURE — 99231 PR SUBSEQUENT HOSPITAL CARE,LEVL I: ICD-10-PCS | Mod: GC,,, | Performed by: STUDENT IN AN ORGANIZED HEALTH CARE EDUCATION/TRAINING PROGRAM

## 2019-01-01 PROCEDURE — 90935 PR HEMODIALYSIS, ONE EVALUATION: ICD-10-PCS | Mod: ,,, | Performed by: INTERNAL MEDICINE

## 2019-01-01 PROCEDURE — 99239 PR HOSPITAL DISCHARGE DAY,>30 MIN: ICD-10-PCS | Mod: GC,,, | Performed by: INTERNAL MEDICINE

## 2019-01-01 PROCEDURE — 63600175 PHARM REV CODE 636 W HCPCS: Performed by: EMERGENCY MEDICINE

## 2019-01-01 PROCEDURE — 93010 ELECTROCARDIOGRAM REPORT: CPT | Mod: 77,,, | Performed by: INTERNAL MEDICINE

## 2019-01-01 PROCEDURE — 36415 COLL VENOUS BLD VENIPUNCTURE: CPT

## 2019-01-01 PROCEDURE — 99232 SBSQ HOSP IP/OBS MODERATE 35: CPT | Mod: ,,, | Performed by: HOSPITALIST

## 2019-01-01 PROCEDURE — 80074 ACUTE HEPATITIS PANEL: CPT

## 2019-01-01 PROCEDURE — 86850 RBC ANTIBODY SCREEN: CPT

## 2019-01-01 PROCEDURE — 93010 ELECTROCARDIOGRAM REPORT: CPT | Mod: 76,,, | Performed by: INTERNAL MEDICINE

## 2019-01-01 PROCEDURE — 99239 HOSP IP/OBS DSCHRG MGMT >30: CPT | Mod: GC,,, | Performed by: INTERNAL MEDICINE

## 2019-01-01 PROCEDURE — 97165 OT EVAL LOW COMPLEX 30 MIN: CPT

## 2019-01-01 PROCEDURE — A4216 STERILE WATER/SALINE, 10 ML: HCPCS | Performed by: STUDENT IN AN ORGANIZED HEALTH CARE EDUCATION/TRAINING PROGRAM

## 2019-01-01 PROCEDURE — 99233 SBSQ HOSP IP/OBS HIGH 50: CPT | Mod: ,,, | Performed by: HOSPITALIST

## 2019-01-01 PROCEDURE — D9220A PRA ANESTHESIA: Mod: CRNA,,, | Performed by: NURSE ANESTHETIST, CERTIFIED REGISTERED

## 2019-01-01 PROCEDURE — 82746 ASSAY OF FOLIC ACID SERUM: CPT

## 2019-01-01 PROCEDURE — 99223 PR INITIAL HOSPITAL CARE,LEVL III: ICD-10-PCS | Mod: AI,GC,, | Performed by: HOSPITALIST

## 2019-01-01 PROCEDURE — 97530 THERAPEUTIC ACTIVITIES: CPT

## 2019-01-01 PROCEDURE — 86920 COMPATIBILITY TEST SPIN: CPT

## 2019-01-01 PROCEDURE — 99223 PR INITIAL HOSPITAL CARE,LEVL III: ICD-10-PCS | Mod: AI,,, | Performed by: INTERNAL MEDICINE

## 2019-01-01 PROCEDURE — 82150 ASSAY OF AMYLASE: CPT

## 2019-01-01 PROCEDURE — P9021 RED BLOOD CELLS UNIT: HCPCS

## 2019-01-01 PROCEDURE — 37000009 HC ANESTHESIA EA ADD 15 MINS: Performed by: INTERNAL MEDICINE

## 2019-01-01 PROCEDURE — 25000003 PHARM REV CODE 250: Performed by: INTERNAL MEDICINE

## 2019-01-01 PROCEDURE — 97161 PT EVAL LOW COMPLEX 20 MIN: CPT

## 2019-01-01 PROCEDURE — 88342 IMHCHEM/IMCYTCHM 1ST ANTB: CPT | Performed by: PATHOLOGY

## 2019-01-01 PROCEDURE — 99223 PR INITIAL HOSPITAL CARE,LEVL III: ICD-10-PCS | Mod: GC,,, | Performed by: INTERNAL MEDICINE

## 2019-01-01 PROCEDURE — 85025 COMPLETE CBC W/AUTO DIFF WBC: CPT | Mod: 91

## 2019-01-01 PROCEDURE — 88305 TISSUE EXAM BY PATHOLOGIST: CPT | Performed by: PATHOLOGY

## 2019-01-01 PROCEDURE — 99225 PR SUBSEQUENT OBSERVATION CARE,LEVEL II: ICD-10-PCS | Mod: ,,, | Performed by: HOSPITALIST

## 2019-01-01 PROCEDURE — 43235 EGD DIAGNOSTIC BRUSH WASH: CPT | Mod: GC,,, | Performed by: INTERNAL MEDICINE

## 2019-01-01 PROCEDURE — 96361 HYDRATE IV INFUSION ADD-ON: CPT

## 2019-01-01 PROCEDURE — 90935 PR HEMODIALYSIS, ONE EVALUATION: ICD-10-PCS | Mod: ,,, | Performed by: NURSE PRACTITIONER

## 2019-01-01 PROCEDURE — 99284 EMERGENCY DEPT VISIT MOD MDM: CPT | Mod: ,,, | Performed by: EMERGENCY MEDICINE

## 2019-01-01 PROCEDURE — 96374 THER/PROPH/DIAG INJ IV PUSH: CPT

## 2019-01-01 PROCEDURE — 99233 SBSQ HOSP IP/OBS HIGH 50: CPT | Mod: GC,,, | Performed by: INTERNAL MEDICINE

## 2019-01-01 PROCEDURE — 93010 ELECTROCARDIOGRAM REPORT: CPT | Mod: ,,, | Performed by: INTERNAL MEDICINE

## 2019-01-01 PROCEDURE — 99223 PR INITIAL HOSPITAL CARE,LEVL III: ICD-10-PCS | Mod: 25,,, | Performed by: NURSE PRACTITIONER

## 2019-01-01 PROCEDURE — 99233 SBSQ HOSP IP/OBS HIGH 50: CPT | Mod: GC,,, | Performed by: HOSPITALIST

## 2019-01-01 PROCEDURE — D9220A PRA ANESTHESIA: ICD-10-PCS | Mod: ANES,,, | Performed by: ANESTHESIOLOGY

## 2019-01-01 PROCEDURE — 30200315 PPD INTRADERMAL TEST REV CODE 302: Performed by: STUDENT IN AN ORGANIZED HEALTH CARE EDUCATION/TRAINING PROGRAM

## 2019-01-01 PROCEDURE — 99231 SBSQ HOSP IP/OBS SF/LOW 25: CPT | Mod: GC,,, | Performed by: HOSPITALIST

## 2019-01-01 PROCEDURE — 99285 EMERGENCY DEPT VISIT HI MDM: CPT | Mod: 25

## 2019-01-01 PROCEDURE — 99285 PR EMERGENCY DEPT VISIT,LEVEL V: ICD-10-PCS | Mod: ,,, | Performed by: PHYSICIAN ASSISTANT

## 2019-01-01 PROCEDURE — 82728 ASSAY OF FERRITIN: CPT

## 2019-01-01 PROCEDURE — 27201012 HC FORCEPS, HOT/COLD, DISP: Performed by: INTERNAL MEDICINE

## 2019-01-01 PROCEDURE — 99285 PR EMERGENCY DEPT VISIT,LEVEL V: ICD-10-PCS | Mod: GC,,, | Performed by: EMERGENCY MEDICINE

## 2019-01-01 PROCEDURE — C9113 INJ PANTOPRAZOLE SODIUM, VIA: HCPCS | Performed by: EMERGENCY MEDICINE

## 2019-01-01 PROCEDURE — 99232 SBSQ HOSP IP/OBS MODERATE 35: CPT | Mod: GC,,, | Performed by: HOSPITALIST

## 2019-01-01 PROCEDURE — 88305 TISSUE EXAM BY PATHOLOGIST: ICD-10-PCS | Mod: 26,,, | Performed by: PATHOLOGY

## 2019-01-01 PROCEDURE — 99239 HOSP IP/OBS DSCHRG MGMT >30: CPT | Mod: ,,, | Performed by: HOSPITALIST

## 2019-01-01 PROCEDURE — 99232 PR SUBSEQUENT HOSPITAL CARE,LEVL II: ICD-10-PCS | Mod: GC,,, | Performed by: HOSPITALIST

## 2019-01-01 PROCEDURE — 99239 PR HOSPITAL DISCHARGE DAY,>30 MIN: ICD-10-PCS | Mod: ,,, | Performed by: HOSPITALIST

## 2019-01-01 PROCEDURE — 43235 PR EGD, FLEX, DIAGNOSTIC: ICD-10-PCS | Mod: GC,,, | Performed by: INTERNAL MEDICINE

## 2019-01-01 PROCEDURE — 99284 EMERGENCY DEPT VISIT MOD MDM: CPT | Mod: 25

## 2019-01-01 PROCEDURE — 80202 ASSAY OF VANCOMYCIN: CPT

## 2019-01-01 PROCEDURE — 83880 ASSAY OF NATRIURETIC PEPTIDE: CPT

## 2019-01-01 PROCEDURE — C9113 INJ PANTOPRAZOLE SODIUM, VIA: HCPCS | Performed by: PHYSICIAN ASSISTANT

## 2019-01-01 PROCEDURE — 37000008 HC ANESTHESIA 1ST 15 MINUTES: Performed by: INTERNAL MEDICINE

## 2019-01-01 PROCEDURE — 87040 BLOOD CULTURE FOR BACTERIA: CPT | Mod: 59

## 2019-01-01 PROCEDURE — 63600175 PHARM REV CODE 636 W HCPCS: Performed by: NURSE PRACTITIONER

## 2019-01-01 PROCEDURE — C9113 INJ PANTOPRAZOLE SODIUM, VIA: HCPCS | Performed by: HOSPITALIST

## 2019-01-01 PROCEDURE — 85730 THROMBOPLASTIN TIME PARTIAL: CPT

## 2019-01-01 PROCEDURE — 94761 N-INVAS EAR/PLS OXIMETRY MLT: CPT

## 2019-01-01 PROCEDURE — 99231 SBSQ HOSP IP/OBS SF/LOW 25: CPT | Mod: GC,,, | Performed by: STUDENT IN AN ORGANIZED HEALTH CARE EDUCATION/TRAINING PROGRAM

## 2019-01-01 PROCEDURE — 99220 PR INITIAL OBSERVATION CARE,LEVL III: CPT | Mod: ,,, | Performed by: HOSPITALIST

## 2019-01-01 PROCEDURE — 90935 HEMODIALYSIS ONE EVALUATION: CPT | Mod: GC,,, | Performed by: INTERNAL MEDICINE

## 2019-01-01 PROCEDURE — G0257 UNSCHED DIALYSIS ESRD PT HOS: HCPCS

## 2019-01-01 PROCEDURE — 99214 PR OFFICE/OUTPT VISIT, EST, LEVL IV, 30-39 MIN: ICD-10-PCS | Mod: GC,,, | Performed by: INTERNAL MEDICINE

## 2019-01-01 PROCEDURE — 88342 CHG IMMUNOCYTOCHEMISTRY: ICD-10-PCS | Mod: 26,,, | Performed by: PATHOLOGY

## 2019-01-01 PROCEDURE — 99214 OFFICE O/P EST MOD 30 MIN: CPT | Mod: GC,,, | Performed by: INTERNAL MEDICINE

## 2019-01-01 PROCEDURE — 99284 PR EMERGENCY DEPT VISIT,LEVEL IV: ICD-10-PCS | Mod: ,,, | Performed by: EMERGENCY MEDICINE

## 2019-01-01 PROCEDURE — 99214 PR OFFICE/OUTPT VISIT, EST, LEVL IV, 30-39 MIN: ICD-10-PCS | Mod: ,,, | Performed by: NURSE PRACTITIONER

## 2019-01-01 PROCEDURE — 43239 EGD BIOPSY SINGLE/MULTIPLE: CPT | Mod: GC,,, | Performed by: INTERNAL MEDICINE

## 2019-01-01 PROCEDURE — 96376 TX/PRO/DX INJ SAME DRUG ADON: CPT | Mod: 59

## 2019-01-01 PROCEDURE — 84484 ASSAY OF TROPONIN QUANT: CPT | Mod: 91

## 2019-01-01 PROCEDURE — 99226 PR SUBSEQUENT OBSERVATION CARE,LEVEL III: CPT | Mod: ,,, | Performed by: HOSPITALIST

## 2019-01-01 PROCEDURE — 99223 1ST HOSP IP/OBS HIGH 75: CPT | Mod: GC,,, | Performed by: INTERNAL MEDICINE

## 2019-01-01 PROCEDURE — 25000003 PHARM REV CODE 250

## 2019-01-01 PROCEDURE — 80069 RENAL FUNCTION PANEL: CPT

## 2019-01-01 PROCEDURE — 25000003 PHARM REV CODE 250: Performed by: NURSE ANESTHETIST, CERTIFIED REGISTERED

## 2019-01-01 PROCEDURE — 99233 PR SUBSEQUENT HOSPITAL CARE,LEVL III: ICD-10-PCS | Mod: ,,, | Performed by: INTERNAL MEDICINE

## 2019-01-01 PROCEDURE — S0028 INJECTION, FAMOTIDINE, 20 MG: HCPCS | Performed by: EMERGENCY MEDICINE

## 2019-01-01 PROCEDURE — 99223 1ST HOSP IP/OBS HIGH 75: CPT | Mod: AI,,, | Performed by: INTERNAL MEDICINE

## 2019-01-01 PROCEDURE — 99238 PR HOSPITAL DISCHARGE DAY,<30 MIN: ICD-10-PCS | Mod: GC,,, | Performed by: HOSPITALIST

## 2019-01-01 PROCEDURE — 90935 HEMODIALYSIS ONE EVALUATION: CPT | Mod: ,,, | Performed by: NURSE PRACTITIONER

## 2019-01-01 PROCEDURE — 83540 ASSAY OF IRON: CPT

## 2019-01-01 PROCEDURE — 80100014 HC HEMODIALYSIS 1:1

## 2019-01-01 PROCEDURE — 99233 PR SUBSEQUENT HOSPITAL CARE,LEVL III: ICD-10-PCS | Mod: ,,, | Performed by: HOSPITALIST

## 2019-01-01 PROCEDURE — 99284 EMERGENCY DEPT VISIT MOD MDM: CPT | Mod: ,,, | Performed by: INTERNAL MEDICINE

## 2019-01-01 PROCEDURE — 63600175 PHARM REV CODE 636 W HCPCS

## 2019-01-01 PROCEDURE — 84443 ASSAY THYROID STIM HORMONE: CPT

## 2019-01-01 PROCEDURE — 43239 PR EGD, FLEX, W/BIOPSY, SGL/MULTI: ICD-10-PCS | Mod: GC,,, | Performed by: INTERNAL MEDICINE

## 2019-01-01 PROCEDURE — 99232 SBSQ HOSP IP/OBS MODERATE 35: CPT | Mod: GC,,, | Performed by: STUDENT IN AN ORGANIZED HEALTH CARE EDUCATION/TRAINING PROGRAM

## 2019-01-01 PROCEDURE — 99223 1ST HOSP IP/OBS HIGH 75: CPT | Mod: AI,,, | Performed by: HOSPITALIST

## 2019-01-01 PROCEDURE — 86706 HEP B SURFACE ANTIBODY: CPT

## 2019-01-01 PROCEDURE — 99220 PR INITIAL OBSERVATION CARE,LEVL III: ICD-10-PCS | Mod: GC,,, | Performed by: HOSPITALIST

## 2019-01-01 PROCEDURE — 88342 IMHCHEM/IMCYTCHM 1ST ANTB: CPT | Mod: 26,,, | Performed by: PATHOLOGY

## 2019-01-01 PROCEDURE — 99238 HOSP IP/OBS DSCHRG MGMT 30/<: CPT | Mod: GC,,, | Performed by: HOSPITALIST

## 2019-01-01 PROCEDURE — 99233 SBSQ HOSP IP/OBS HIGH 50: CPT | Mod: ,,, | Performed by: INTERNAL MEDICINE

## 2019-01-01 PROCEDURE — 83036 HEMOGLOBIN GLYCOSYLATED A1C: CPT

## 2019-01-01 PROCEDURE — 96375 TX/PRO/DX INJ NEW DRUG ADDON: CPT | Mod: 59

## 2019-01-01 PROCEDURE — 99233 PR SUBSEQUENT HOSPITAL CARE,LEVL III: ICD-10-PCS | Mod: GC,,, | Performed by: HOSPITALIST

## 2019-01-01 PROCEDURE — 25000003 PHARM REV CODE 250: Performed by: NURSE PRACTITIONER

## 2019-01-01 PROCEDURE — 99220 PR INITIAL OBSERVATION CARE,LEVL III: CPT | Mod: GC,,, | Performed by: HOSPITALIST

## 2019-01-01 PROCEDURE — 99231 PR SUBSEQUENT HOSPITAL CARE,LEVL I: ICD-10-PCS | Mod: GC,,, | Performed by: HOSPITALIST

## 2019-01-01 PROCEDURE — 43235 PR EGD, FLEX, DIAGNOSTIC: ICD-10-PCS | Mod: ,,, | Performed by: INTERNAL MEDICINE

## 2019-01-01 PROCEDURE — 43235 EGD DIAGNOSTIC BRUSH WASH: CPT | Performed by: INTERNAL MEDICINE

## 2019-01-01 PROCEDURE — 27201247 HC HEMODIALYSIS, SET-UP & CANCEL

## 2019-01-01 PROCEDURE — 83970 ASSAY OF PARATHORMONE: CPT

## 2019-01-01 PROCEDURE — 43235 EGD DIAGNOSTIC BRUSH WASH: CPT | Mod: ,,, | Performed by: INTERNAL MEDICINE

## 2019-01-01 PROCEDURE — 99225 PR SUBSEQUENT OBSERVATION CARE,LEVEL II: CPT | Mod: ,,, | Performed by: HOSPITALIST

## 2019-01-01 PROCEDURE — 96372 THER/PROPH/DIAG INJ SC/IM: CPT

## 2019-01-01 PROCEDURE — 97535 SELF CARE MNGMENT TRAINING: CPT

## 2019-01-01 PROCEDURE — 93010 EKG 12-LEAD: ICD-10-PCS | Mod: 77,,, | Performed by: INTERNAL MEDICINE

## 2019-01-01 PROCEDURE — 99233 PR SUBSEQUENT HOSPITAL CARE,LEVL III: ICD-10-PCS | Mod: GC,,, | Performed by: INTERNAL MEDICINE

## 2019-01-01 PROCEDURE — 99223 PR INITIAL HOSPITAL CARE,LEVL III: ICD-10-PCS | Mod: AI,,, | Performed by: HOSPITALIST

## 2019-01-01 PROCEDURE — 99220 PR INITIAL OBSERVATION CARE,LEVL III: ICD-10-PCS | Mod: ,,, | Performed by: HOSPITALIST

## 2019-01-01 PROCEDURE — 97802 MEDICAL NUTRITION INDIV IN: CPT

## 2019-01-01 PROCEDURE — 43239 EGD BIOPSY SINGLE/MULTIPLE: CPT | Performed by: INTERNAL MEDICINE

## 2019-01-01 PROCEDURE — 83615 LACTATE (LD) (LDH) ENZYME: CPT

## 2019-01-01 PROCEDURE — 88305 TISSUE EXAM BY PATHOLOGIST: CPT | Mod: 26,,, | Performed by: PATHOLOGY

## 2019-01-01 PROCEDURE — 99223 1ST HOSP IP/OBS HIGH 75: CPT | Mod: 25,,, | Performed by: INTERNAL MEDICINE

## 2019-01-01 PROCEDURE — 96374 THER/PROPH/DIAG INJ IV PUSH: CPT | Mod: 59

## 2019-01-01 PROCEDURE — 86580 TB INTRADERMAL TEST: CPT | Performed by: STUDENT IN AN ORGANIZED HEALTH CARE EDUCATION/TRAINING PROGRAM

## 2019-01-01 PROCEDURE — 99285 EMERGENCY DEPT VISIT HI MDM: CPT | Mod: ,,, | Performed by: PHYSICIAN ASSISTANT

## 2019-01-01 PROCEDURE — 25500020 PHARM REV CODE 255: Performed by: EMERGENCY MEDICINE

## 2019-01-01 PROCEDURE — 25000003 PHARM REV CODE 250: Performed by: EMERGENCY MEDICINE

## 2019-01-01 PROCEDURE — 99214 OFFICE O/P EST MOD 30 MIN: CPT | Mod: ,,, | Performed by: NURSE PRACTITIONER

## 2019-01-01 PROCEDURE — 90935 PR HEMODIALYSIS, ONE EVALUATION: ICD-10-PCS | Mod: GC,,, | Performed by: INTERNAL MEDICINE

## 2019-01-01 PROCEDURE — 99223 PR INITIAL HOSPITAL CARE,LEVL III: ICD-10-PCS | Mod: 25,,, | Performed by: INTERNAL MEDICINE

## 2019-01-01 PROCEDURE — 99223 1ST HOSP IP/OBS HIGH 75: CPT | Mod: AI,GC,, | Performed by: HOSPITALIST

## 2019-01-01 PROCEDURE — 99900038 HC OT GENERIC THERAPY SCREENING (STAT)

## 2019-01-01 PROCEDURE — 99285 EMERGENCY DEPT VISIT HI MDM: CPT | Mod: GC,,, | Performed by: EMERGENCY MEDICINE

## 2019-01-01 RX ORDER — ONDANSETRON 8 MG/1
8 TABLET, ORALLY DISINTEGRATING ORAL EVERY 8 HOURS PRN
Status: DISCONTINUED | OUTPATIENT
Start: 2019-01-01 | End: 2019-01-01 | Stop reason: HOSPADM

## 2019-01-01 RX ORDER — CARVEDILOL 3.12 MG/1
3.12 TABLET ORAL ONCE
Status: COMPLETED | OUTPATIENT
Start: 2019-01-01 | End: 2019-01-01

## 2019-01-01 RX ORDER — SODIUM CHLORIDE 9 MG/ML
INJECTION, SOLUTION INTRAVENOUS
Status: DISCONTINUED | OUTPATIENT
Start: 2019-01-01 | End: 2019-01-01 | Stop reason: HOSPADM

## 2019-01-01 RX ORDER — ONDANSETRON 2 MG/ML
4 INJECTION INTRAMUSCULAR; INTRAVENOUS EVERY 6 HOURS
Status: DISCONTINUED | OUTPATIENT
Start: 2019-01-01 | End: 2019-01-01 | Stop reason: HOSPADM

## 2019-01-01 RX ORDER — SUCRALFATE 1 G/10ML
500 SUSPENSION ORAL 2 TIMES DAILY
Status: ON HOLD | COMMUNITY
End: 2019-01-01 | Stop reason: HOSPADM

## 2019-01-01 RX ORDER — IBUPROFEN 200 MG
16 TABLET ORAL
Status: DISCONTINUED | OUTPATIENT
Start: 2019-01-01 | End: 2019-01-01 | Stop reason: HOSPADM

## 2019-01-01 RX ORDER — IBUPROFEN 200 MG
24 TABLET ORAL
Status: DISCONTINUED | OUTPATIENT
Start: 2019-01-01 | End: 2019-01-01 | Stop reason: HOSPADM

## 2019-01-01 RX ORDER — PROPOFOL 10 MG/ML
VIAL (ML) INTRAVENOUS
Status: DISCONTINUED | OUTPATIENT
Start: 2019-01-01 | End: 2019-01-01

## 2019-01-01 RX ORDER — METOCLOPRAMIDE 5 MG/1
5 TABLET ORAL
Status: DISCONTINUED | OUTPATIENT
Start: 2019-01-01 | End: 2019-01-01

## 2019-01-01 RX ORDER — PROCHLORPERAZINE EDISYLATE 5 MG/ML
10 INJECTION INTRAMUSCULAR; INTRAVENOUS EVERY 6 HOURS PRN
Status: DISCONTINUED | OUTPATIENT
Start: 2019-01-01 | End: 2019-01-01 | Stop reason: HOSPADM

## 2019-01-01 RX ORDER — PANTOPRAZOLE SODIUM 40 MG/1
40 TABLET, DELAYED RELEASE ORAL
Status: DISCONTINUED | OUTPATIENT
Start: 2019-01-01 | End: 2019-01-01

## 2019-01-01 RX ORDER — FAMOTIDINE 10 MG/ML
20 INJECTION INTRAVENOUS
Status: COMPLETED | OUTPATIENT
Start: 2019-01-01 | End: 2019-01-01

## 2019-01-01 RX ORDER — PANTOPRAZOLE SODIUM 40 MG/10ML
40 INJECTION, POWDER, LYOPHILIZED, FOR SOLUTION INTRAVENOUS
Status: DISCONTINUED | OUTPATIENT
Start: 2019-01-01 | End: 2019-01-01

## 2019-01-01 RX ORDER — PANTOPRAZOLE SODIUM 40 MG/10ML
80 INJECTION, POWDER, LYOPHILIZED, FOR SOLUTION INTRAVENOUS
Status: COMPLETED | OUTPATIENT
Start: 2019-01-01 | End: 2019-01-01

## 2019-01-01 RX ORDER — ACETAMINOPHEN 325 MG/1
650 TABLET ORAL EVERY 8 HOURS PRN
Status: DISCONTINUED | OUTPATIENT
Start: 2019-01-01 | End: 2019-01-01 | Stop reason: HOSPADM

## 2019-01-01 RX ORDER — ONDANSETRON 2 MG/ML
4 INJECTION INTRAMUSCULAR; INTRAVENOUS EVERY 8 HOURS PRN
Status: DISCONTINUED | OUTPATIENT
Start: 2019-01-01 | End: 2019-01-01 | Stop reason: HOSPADM

## 2019-01-01 RX ORDER — POTASSIUM CHLORIDE 20 MEQ/15ML
40 SOLUTION ORAL 2 TIMES DAILY
Status: COMPLETED | OUTPATIENT
Start: 2019-01-01 | End: 2019-01-01

## 2019-01-01 RX ORDER — PANTOPRAZOLE SODIUM 40 MG/1
40 TABLET, DELAYED RELEASE ORAL
Status: DISCONTINUED | OUTPATIENT
Start: 2019-01-01 | End: 2019-01-01 | Stop reason: HOSPADM

## 2019-01-01 RX ORDER — GLYCOPYRROLATE 0.2 MG/ML
INJECTION INTRAMUSCULAR; INTRAVENOUS
Status: DISCONTINUED | OUTPATIENT
Start: 2019-01-01 | End: 2019-01-01

## 2019-01-01 RX ORDER — METOCLOPRAMIDE 10 MG/1
10 TABLET ORAL
Qty: 90 TABLET | Refills: 1 | Status: ON HOLD | OUTPATIENT
Start: 2019-01-01 | End: 2019-01-01 | Stop reason: SDUPTHER

## 2019-01-01 RX ORDER — SODIUM CHLORIDE 0.9 % (FLUSH) 0.9 %
5 SYRINGE (ML) INJECTION
Status: DISCONTINUED | OUTPATIENT
Start: 2019-01-01 | End: 2019-01-01 | Stop reason: HOSPADM

## 2019-01-01 RX ORDER — SUCRALFATE 1 G/10ML
0.5 SUSPENSION ORAL 2 TIMES DAILY
Status: DISCONTINUED | OUTPATIENT
Start: 2019-01-01 | End: 2019-01-01 | Stop reason: HOSPADM

## 2019-01-01 RX ORDER — SODIUM CHLORIDE 9 MG/ML
INJECTION, SOLUTION INTRAVENOUS ONCE
Status: COMPLETED | OUTPATIENT
Start: 2019-01-01 | End: 2019-01-01

## 2019-01-01 RX ORDER — SODIUM CHLORIDE 0.9 % (FLUSH) 0.9 %
10 SYRINGE (ML) INJECTION
Status: CANCELLED | OUTPATIENT
Start: 2019-01-01

## 2019-01-01 RX ORDER — CARVEDILOL 3.12 MG/1
3.12 TABLET ORAL 2 TIMES DAILY WITH MEALS
Status: DISCONTINUED | OUTPATIENT
Start: 2019-01-01 | End: 2019-01-01 | Stop reason: HOSPADM

## 2019-01-01 RX ORDER — METOCLOPRAMIDE HYDROCHLORIDE 5 MG/ML
5 INJECTION INTRAMUSCULAR; INTRAVENOUS ONCE
Status: COMPLETED | OUTPATIENT
Start: 2019-01-01 | End: 2019-01-01

## 2019-01-01 RX ORDER — POTASSIUM CHLORIDE 7.45 MG/ML
10 INJECTION INTRAVENOUS
Status: DISPENSED | OUTPATIENT
Start: 2019-01-01 | End: 2019-01-01

## 2019-01-01 RX ORDER — SYRING-NEEDL,DISP,INSUL,0.3 ML 29 G X1/2"
296 SYRINGE, EMPTY DISPOSABLE MISCELLANEOUS
Status: COMPLETED | OUTPATIENT
Start: 2019-01-01 | End: 2019-01-01

## 2019-01-01 RX ORDER — ONDANSETRON 2 MG/ML
4 INJECTION INTRAMUSCULAR; INTRAVENOUS EVERY 8 HOURS PRN
Status: DISCONTINUED | OUTPATIENT
Start: 2019-01-01 | End: 2019-01-01

## 2019-01-01 RX ORDER — SEVELAMER CARBONATE 800 MG/1
1600 TABLET, FILM COATED ORAL
Status: DISCONTINUED | OUTPATIENT
Start: 2019-01-01 | End: 2019-01-01 | Stop reason: HOSPADM

## 2019-01-01 RX ORDER — GLUCAGON 1 MG
1 KIT INJECTION
Status: DISCONTINUED | OUTPATIENT
Start: 2019-01-01 | End: 2019-01-01 | Stop reason: HOSPADM

## 2019-01-01 RX ORDER — PANTOPRAZOLE SODIUM 40 MG/10ML
40 INJECTION, POWDER, LYOPHILIZED, FOR SOLUTION INTRAVENOUS 2 TIMES DAILY
Status: DISCONTINUED | OUTPATIENT
Start: 2019-01-01 | End: 2019-01-01 | Stop reason: HOSPADM

## 2019-01-01 RX ORDER — LIDOCAINE HCL/PF 100 MG/5ML
SYRINGE (ML) INTRAVENOUS
Status: DISCONTINUED | OUTPATIENT
Start: 2019-01-01 | End: 2019-01-01

## 2019-01-01 RX ORDER — SODIUM CHLORIDE 0.9 % (FLUSH) 0.9 %
10 SYRINGE (ML) INJECTION
Status: DISCONTINUED | OUTPATIENT
Start: 2019-01-01 | End: 2019-01-01 | Stop reason: HOSPADM

## 2019-01-01 RX ORDER — ONDANSETRON 2 MG/ML
INJECTION INTRAMUSCULAR; INTRAVENOUS
Status: DISCONTINUED | OUTPATIENT
Start: 2019-01-01 | End: 2019-01-01

## 2019-01-01 RX ORDER — POTASSIUM CHLORIDE 750 MG/1
30 CAPSULE, EXTENDED RELEASE ORAL ONCE
Status: COMPLETED | OUTPATIENT
Start: 2019-01-01 | End: 2019-01-01

## 2019-01-01 RX ORDER — SUCRALFATE 1 G/10ML
1 SUSPENSION ORAL
Qty: 420 ML | Refills: 2 | Status: ON HOLD | OUTPATIENT
Start: 2019-01-01 | End: 2020-01-01 | Stop reason: HOSPADM

## 2019-01-01 RX ORDER — PANTOPRAZOLE SODIUM 40 MG/10ML
40 INJECTION, POWDER, LYOPHILIZED, FOR SOLUTION INTRAVENOUS 2 TIMES DAILY
Status: DISCONTINUED | OUTPATIENT
Start: 2019-01-01 | End: 2019-01-01

## 2019-01-01 RX ORDER — SUCRALFATE 1 G/10ML
0.5 SUSPENSION ORAL 2 TIMES DAILY
Status: DISCONTINUED | OUTPATIENT
Start: 2019-01-01 | End: 2019-01-01

## 2019-01-01 RX ORDER — PROMETHAZINE HYDROCHLORIDE 25 MG/1
25 TABLET ORAL EVERY 6 HOURS PRN
Qty: 15 TABLET | Refills: 0 | Status: SHIPPED | OUTPATIENT
Start: 2019-01-01 | End: 2020-01-01 | Stop reason: CLARIF

## 2019-01-01 RX ORDER — ONDANSETRON 2 MG/ML
8 INJECTION INTRAMUSCULAR; INTRAVENOUS EVERY 8 HOURS
Status: DISCONTINUED | OUTPATIENT
Start: 2019-01-01 | End: 2019-01-01

## 2019-01-01 RX ORDER — METOCLOPRAMIDE HYDROCHLORIDE 5 MG/ML
10 INJECTION INTRAMUSCULAR; INTRAVENOUS EVERY 8 HOURS
Status: DISCONTINUED | OUTPATIENT
Start: 2019-01-01 | End: 2019-01-01

## 2019-01-01 RX ORDER — SODIUM CHLORIDE 9 MG/ML
INJECTION, SOLUTION INTRAVENOUS ONCE
Status: DISCONTINUED | OUTPATIENT
Start: 2019-01-01 | End: 2019-01-01 | Stop reason: HOSPADM

## 2019-01-01 RX ORDER — SEVELAMER CARBONATE 800 MG/1
1600 TABLET, FILM COATED ORAL
Qty: 180 TABLET | Refills: 11 | Status: ON HOLD | OUTPATIENT
Start: 2019-01-01 | End: 2019-01-01 | Stop reason: SDUPTHER

## 2019-01-01 RX ORDER — POLYETHYLENE GLYCOL 3350, SODIUM SULFATE ANHYDROUS, SODIUM BICARBONATE, SODIUM CHLORIDE, POTASSIUM CHLORIDE 236; 22.74; 6.74; 5.86; 2.97 G/4L; G/4L; G/4L; G/4L; G/4L
4000 POWDER, FOR SOLUTION ORAL ONCE
Status: COMPLETED | OUTPATIENT
Start: 2019-01-01 | End: 2019-01-01

## 2019-01-01 RX ORDER — PANTOPRAZOLE SODIUM 40 MG/10ML
40 INJECTION, POWDER, LYOPHILIZED, FOR SOLUTION INTRAVENOUS
Status: COMPLETED | OUTPATIENT
Start: 2019-01-01 | End: 2019-01-01

## 2019-01-01 RX ORDER — PANTOPRAZOLE SODIUM 40 MG/1
40 TABLET, DELAYED RELEASE ORAL DAILY
Status: DISCONTINUED | OUTPATIENT
Start: 2019-01-01 | End: 2019-01-01

## 2019-01-01 RX ORDER — HYDROCODONE BITARTRATE AND ACETAMINOPHEN 5; 325 MG/1; MG/1
1 TABLET ORAL EVERY 4 HOURS PRN
Status: DISCONTINUED | OUTPATIENT
Start: 2019-01-01 | End: 2019-01-01

## 2019-01-01 RX ORDER — PSEUDOEPHEDRINE/ACETAMINOPHEN 30MG-500MG
100 TABLET ORAL
Status: COMPLETED | OUTPATIENT
Start: 2019-01-01 | End: 2019-01-01

## 2019-01-01 RX ORDER — PROCHLORPERAZINE MALEATE 10 MG
10 TABLET ORAL EVERY 8 HOURS PRN
Qty: 14 TABLET | Refills: 0 | Status: ON HOLD | OUTPATIENT
Start: 2019-01-01 | End: 2019-01-01 | Stop reason: HOSPADM

## 2019-01-01 RX ORDER — SEVELAMER CARBONATE 800 MG/1
800 TABLET, FILM COATED ORAL
Qty: 90 TABLET | Refills: 3 | Status: ON HOLD | OUTPATIENT
Start: 2019-01-01 | End: 2020-01-01 | Stop reason: HOSPADM

## 2019-01-01 RX ORDER — POTASSIUM CHLORIDE 750 MG/1
50 CAPSULE, EXTENDED RELEASE ORAL ONCE
Status: COMPLETED | OUTPATIENT
Start: 2019-01-01 | End: 2019-01-01

## 2019-01-01 RX ORDER — FENTANYL CITRATE 50 UG/ML
INJECTION, SOLUTION INTRAMUSCULAR; INTRAVENOUS
Status: DISCONTINUED | OUTPATIENT
Start: 2019-01-01 | End: 2019-01-01

## 2019-01-01 RX ORDER — METOCLOPRAMIDE 5 MG/1
10 TABLET ORAL
Status: DISCONTINUED | OUTPATIENT
Start: 2019-01-01 | End: 2019-01-01

## 2019-01-01 RX ORDER — LABETALOL HCL 20 MG/4 ML
10 SYRINGE (ML) INTRAVENOUS ONCE
Status: COMPLETED | OUTPATIENT
Start: 2019-01-01 | End: 2019-01-01

## 2019-01-01 RX ORDER — SODIUM,POTASSIUM PHOSPHATES 280-250MG
2 POWDER IN PACKET (EA) ORAL ONCE
Status: COMPLETED | OUTPATIENT
Start: 2019-01-01 | End: 2019-01-01

## 2019-01-01 RX ORDER — SODIUM CHLORIDE 9 MG/ML
INJECTION, SOLUTION INTRAVENOUS CONTINUOUS PRN
Status: DISCONTINUED | OUTPATIENT
Start: 2019-01-01 | End: 2019-01-01

## 2019-01-01 RX ORDER — LORAZEPAM 2 MG/ML
0.5 INJECTION INTRAMUSCULAR ONCE
Status: COMPLETED | OUTPATIENT
Start: 2019-01-01 | End: 2019-01-01

## 2019-01-01 RX ORDER — ONDANSETRON 2 MG/ML
4 INJECTION INTRAMUSCULAR; INTRAVENOUS
Status: COMPLETED | OUTPATIENT
Start: 2019-01-01 | End: 2019-01-01

## 2019-01-01 RX ORDER — IPRATROPIUM BROMIDE AND ALBUTEROL SULFATE 2.5; .5 MG/3ML; MG/3ML
3 SOLUTION RESPIRATORY (INHALATION) EVERY 4 HOURS PRN
Status: DISCONTINUED | OUTPATIENT
Start: 2019-01-01 | End: 2019-01-01 | Stop reason: HOSPADM

## 2019-01-01 RX ORDER — PROMETHAZINE HYDROCHLORIDE 25 MG/ML
12.5 INJECTION, SOLUTION INTRAMUSCULAR; INTRAVENOUS ONCE
Status: COMPLETED | OUTPATIENT
Start: 2019-01-01 | End: 2019-01-01

## 2019-01-01 RX ORDER — CHLORPROMAZINE HYDROCHLORIDE 25 MG/1
25 TABLET, FILM COATED ORAL ONCE AS NEEDED
Status: COMPLETED | OUTPATIENT
Start: 2019-01-01 | End: 2019-01-01

## 2019-01-01 RX ORDER — ACETAMINOPHEN 650 MG/20.3ML
650 LIQUID ORAL EVERY 6 HOURS PRN
Status: DISCONTINUED | OUTPATIENT
Start: 2019-01-01 | End: 2019-01-01 | Stop reason: HOSPADM

## 2019-01-01 RX ORDER — SODIUM CHLORIDE 9 MG/ML
INJECTION, SOLUTION INTRAVENOUS ONCE
Status: DISCONTINUED | OUTPATIENT
Start: 2019-01-01 | End: 2019-01-01

## 2019-01-01 RX ORDER — PANTOPRAZOLE SODIUM 40 MG/1
40 TABLET, DELAYED RELEASE ORAL 2 TIMES DAILY
Status: ON HOLD | COMMUNITY
End: 2019-01-01 | Stop reason: HOSPADM

## 2019-01-01 RX ORDER — HYDROMORPHONE HYDROCHLORIDE 1 MG/ML
0.2 INJECTION, SOLUTION INTRAMUSCULAR; INTRAVENOUS; SUBCUTANEOUS EVERY 5 MIN PRN
Status: DISCONTINUED | OUTPATIENT
Start: 2019-01-01 | End: 2019-01-01 | Stop reason: HOSPADM

## 2019-01-01 RX ORDER — SODIUM CHLORIDE 0.9 % (FLUSH) 0.9 %
3 SYRINGE (ML) INJECTION
Status: DISCONTINUED | OUTPATIENT
Start: 2019-01-01 | End: 2019-01-01 | Stop reason: HOSPADM

## 2019-01-01 RX ORDER — SUCRALFATE 1 G/10ML
500 SUSPENSION ORAL 2 TIMES DAILY
Qty: 420 ML | Refills: 3 | Status: SHIPPED | OUTPATIENT
Start: 2019-01-01 | End: 2019-01-01 | Stop reason: SDUPTHER

## 2019-01-01 RX ORDER — METOCLOPRAMIDE 5 MG/1
10 TABLET ORAL EVERY 6 HOURS PRN
Status: DISCONTINUED | OUTPATIENT
Start: 2019-01-01 | End: 2019-01-01 | Stop reason: HOSPADM

## 2019-01-01 RX ORDER — ONDANSETRON 2 MG/ML
4 INJECTION INTRAMUSCULAR; INTRAVENOUS EVERY 6 HOURS PRN
Status: DISCONTINUED | OUTPATIENT
Start: 2019-01-01 | End: 2019-01-01

## 2019-01-01 RX ORDER — CARVEDILOL 25 MG/1
25 TABLET ORAL 2 TIMES DAILY WITH MEALS
Status: DISCONTINUED | OUTPATIENT
Start: 2019-01-01 | End: 2019-01-01

## 2019-01-01 RX ORDER — AMOXICILLIN 250 MG
1 CAPSULE ORAL 2 TIMES DAILY
Status: DISCONTINUED | OUTPATIENT
Start: 2019-01-01 | End: 2019-01-01 | Stop reason: HOSPADM

## 2019-01-01 RX ORDER — ONDANSETRON 8 MG/1
8 TABLET, ORALLY DISINTEGRATING ORAL ONCE
Status: COMPLETED | OUTPATIENT
Start: 2019-01-01 | End: 2019-01-01

## 2019-01-01 RX ORDER — METOCLOPRAMIDE HYDROCHLORIDE 5 MG/ML
10 INJECTION INTRAMUSCULAR; INTRAVENOUS ONCE
Status: COMPLETED | OUTPATIENT
Start: 2019-01-01 | End: 2019-01-01

## 2019-01-01 RX ORDER — CARVEDILOL 3.12 MG/1
3.12 TABLET ORAL 2 TIMES DAILY
Status: DISCONTINUED | OUTPATIENT
Start: 2019-01-01 | End: 2019-01-01 | Stop reason: HOSPADM

## 2019-01-01 RX ORDER — MIDODRINE HYDROCHLORIDE 5 MG/1
TABLET ORAL
Qty: 60 TABLET | Refills: 3 | Status: ON HOLD | OUTPATIENT
Start: 2019-01-01 | End: 2020-01-01 | Stop reason: SDUPTHER

## 2019-01-01 RX ORDER — SEVELAMER CARBONATE 800 MG/1
1600 TABLET, FILM COATED ORAL
Status: DISCONTINUED | OUTPATIENT
Start: 2019-01-01 | End: 2019-01-01

## 2019-01-01 RX ORDER — PANTOPRAZOLE SODIUM 40 MG/1
40 TABLET, DELAYED RELEASE ORAL 2 TIMES DAILY
Qty: 60 TABLET | Refills: 11 | Status: ON HOLD | OUTPATIENT
Start: 2019-01-01 | End: 2020-01-01 | Stop reason: HOSPADM

## 2019-01-01 RX ORDER — ONDANSETRON HYDROCHLORIDE 8 MG/1
8 TABLET, FILM COATED ORAL EVERY 12 HOURS PRN
Qty: 40 TABLET | Refills: 0 | Status: SHIPPED | OUTPATIENT
Start: 2019-01-01 | End: 2019-01-01 | Stop reason: SDUPTHER

## 2019-01-01 RX ORDER — METOCLOPRAMIDE HYDROCHLORIDE 5 MG/ML
5 INJECTION INTRAMUSCULAR; INTRAVENOUS
Status: DISCONTINUED | OUTPATIENT
Start: 2019-01-01 | End: 2019-01-01 | Stop reason: HOSPADM

## 2019-01-01 RX ORDER — OCTREOTIDE ACETATE 100 UG/ML
50 INJECTION, SOLUTION INTRAVENOUS; SUBCUTANEOUS
Status: COMPLETED | OUTPATIENT
Start: 2019-01-01 | End: 2019-01-01

## 2019-01-01 RX ORDER — METOCLOPRAMIDE HYDROCHLORIDE 5 MG/ML
10 INJECTION INTRAMUSCULAR; INTRAVENOUS
Status: COMPLETED | OUTPATIENT
Start: 2019-01-01 | End: 2019-01-01

## 2019-01-01 RX ORDER — HALOPERIDOL 5 MG/ML
1 INJECTION INTRAMUSCULAR
Status: COMPLETED | OUTPATIENT
Start: 2019-01-01 | End: 2019-01-01

## 2019-01-01 RX ORDER — HYDROCODONE BITARTRATE AND ACETAMINOPHEN 500; 5 MG/1; MG/1
TABLET ORAL
Status: DISCONTINUED | OUTPATIENT
Start: 2019-01-01 | End: 2019-01-01 | Stop reason: HOSPADM

## 2019-01-01 RX ORDER — ONDANSETRON 2 MG/ML
4 INJECTION INTRAMUSCULAR; INTRAVENOUS EVERY 6 HOURS
Status: DISCONTINUED | OUTPATIENT
Start: 2019-01-01 | End: 2019-01-01

## 2019-01-01 RX ORDER — ONDANSETRON 2 MG/ML
4 INJECTION INTRAMUSCULAR; INTRAVENOUS DAILY PRN
Status: DISCONTINUED | OUTPATIENT
Start: 2019-01-01 | End: 2019-01-01 | Stop reason: HOSPADM

## 2019-01-01 RX ORDER — AMOXICILLIN 250 MG
1 CAPSULE ORAL 2 TIMES DAILY PRN
Status: DISCONTINUED | OUTPATIENT
Start: 2019-01-01 | End: 2019-01-01 | Stop reason: HOSPADM

## 2019-01-01 RX ORDER — RAMELTEON 8 MG/1
8 TABLET ORAL NIGHTLY PRN
Status: DISCONTINUED | OUTPATIENT
Start: 2019-01-01 | End: 2019-01-01 | Stop reason: HOSPADM

## 2019-01-01 RX ORDER — ONDANSETRON HYDROCHLORIDE 8 MG/1
8 TABLET, FILM COATED ORAL EVERY 12 HOURS PRN
Qty: 60 TABLET | Refills: 1 | Status: SHIPPED | OUTPATIENT
Start: 2019-01-01 | End: 2019-01-01 | Stop reason: HOSPADM

## 2019-01-01 RX ORDER — HYDROCODONE BITARTRATE AND ACETAMINOPHEN 5; 325 MG/1; MG/1
1 TABLET ORAL EVERY 4 HOURS PRN
Status: DISCONTINUED | OUTPATIENT
Start: 2019-01-01 | End: 2019-01-01 | Stop reason: HOSPADM

## 2019-01-01 RX ORDER — ATORVASTATIN CALCIUM 20 MG/1
80 TABLET, FILM COATED ORAL NIGHTLY
Status: DISCONTINUED | OUTPATIENT
Start: 2019-01-01 | End: 2019-01-01 | Stop reason: HOSPADM

## 2019-01-01 RX ORDER — ONDANSETRON 2 MG/ML
8 INJECTION INTRAMUSCULAR; INTRAVENOUS
Status: COMPLETED | OUTPATIENT
Start: 2019-01-01 | End: 2019-01-01

## 2019-01-01 RX ORDER — METOCLOPRAMIDE 5 MG/1
5 TABLET ORAL
Status: DISCONTINUED | OUTPATIENT
Start: 2019-01-01 | End: 2019-01-01 | Stop reason: HOSPADM

## 2019-01-01 RX ORDER — SODIUM CHLORIDE 9 MG/ML
INJECTION, SOLUTION INTRAVENOUS ONCE
Status: CANCELLED | OUTPATIENT
Start: 2019-01-01

## 2019-01-01 RX ORDER — MEPERIDINE HYDROCHLORIDE 50 MG/ML
12.5 INJECTION INTRAMUSCULAR; INTRAVENOUS; SUBCUTANEOUS EVERY 10 MIN PRN
Status: DISCONTINUED | OUTPATIENT
Start: 2019-01-01 | End: 2019-01-01 | Stop reason: HOSPADM

## 2019-01-01 RX ORDER — ONDANSETRON 4 MG/1
4 TABLET, ORALLY DISINTEGRATING ORAL
Status: DISCONTINUED | OUTPATIENT
Start: 2019-01-01 | End: 2019-01-01

## 2019-01-01 RX ORDER — METOCLOPRAMIDE 10 MG/1
10 TABLET ORAL
Qty: 90 TABLET | Refills: 1 | Status: ON HOLD | OUTPATIENT
Start: 2019-01-01 | End: 2020-01-01 | Stop reason: HOSPADM

## 2019-01-01 RX ORDER — METOCLOPRAMIDE 5 MG/1
10 TABLET ORAL
Status: DISCONTINUED | OUTPATIENT
Start: 2019-01-01 | End: 2019-01-01 | Stop reason: HOSPADM

## 2019-01-01 RX ORDER — PANTOPRAZOLE SODIUM 40 MG/10ML
40 INJECTION, POWDER, LYOPHILIZED, FOR SOLUTION INTRAVENOUS ONCE
Status: DISCONTINUED | OUTPATIENT
Start: 2019-01-01 | End: 2019-01-01 | Stop reason: HOSPADM

## 2019-01-01 RX ORDER — PHENYLEPHRINE HYDROCHLORIDE 10 MG/ML
INJECTION INTRAVENOUS
Status: DISCONTINUED | OUTPATIENT
Start: 2019-01-01 | End: 2019-01-01

## 2019-01-01 RX ORDER — ACETAMINOPHEN 325 MG/1
650 TABLET ORAL EVERY 4 HOURS PRN
Status: DISCONTINUED | OUTPATIENT
Start: 2019-01-01 | End: 2019-01-01 | Stop reason: HOSPADM

## 2019-01-01 RX ORDER — ACETAMINOPHEN 325 MG/1
650 TABLET ORAL EVERY 8 HOURS PRN
Refills: 0 | COMMUNITY
Start: 2019-01-01

## 2019-01-01 RX ORDER — PANTOPRAZOLE SODIUM 40 MG/1
40 TABLET, DELAYED RELEASE ORAL 2 TIMES DAILY
Qty: 60 TABLET | Refills: 6 | Status: ON HOLD | OUTPATIENT
Start: 2019-01-01 | End: 2019-01-01 | Stop reason: SDUPTHER

## 2019-01-01 RX ORDER — ONDANSETRON 2 MG/ML
8 INJECTION INTRAMUSCULAR; INTRAVENOUS EVERY 4 HOURS PRN
Status: DISCONTINUED | OUTPATIENT
Start: 2019-01-01 | End: 2019-01-01

## 2019-01-01 RX ORDER — SEVELAMER CARBONATE 800 MG/1
1600 TABLET, FILM COATED ORAL
Status: DISCONTINUED | OUTPATIENT
Start: 2019-01-01 | End: 2019-01-01 | Stop reason: DRUGHIGH

## 2019-01-01 RX ORDER — PANTOPRAZOLE SODIUM 40 MG/1
40 TABLET, DELAYED RELEASE ORAL EVERY 12 HOURS
Qty: 180 TABLET | Refills: 3 | Status: SHIPPED | OUTPATIENT
Start: 2019-01-01 | End: 2019-01-01 | Stop reason: HOSPADM

## 2019-01-01 RX ORDER — ONDANSETRON HYDROCHLORIDE 8 MG/1
8 TABLET, FILM COATED ORAL EVERY 12 HOURS PRN
Qty: 60 TABLET | Refills: 1 | Status: SHIPPED | OUTPATIENT
Start: 2019-01-01

## 2019-01-01 RX ORDER — HYDROCODONE BITARTRATE AND ACETAMINOPHEN 500; 5 MG/1; MG/1
TABLET ORAL
Status: DISCONTINUED | OUTPATIENT
Start: 2019-01-01 | End: 2019-01-01

## 2019-01-01 RX ORDER — PANTOPRAZOLE SODIUM 40 MG/1
40 TABLET, DELAYED RELEASE ORAL 2 TIMES DAILY
Qty: 60 TABLET | Refills: 11 | Status: SHIPPED | OUTPATIENT
Start: 2019-01-01 | End: 2019-01-01 | Stop reason: SDUPTHER

## 2019-01-01 RX ORDER — HYDROCODONE BITARTRATE AND ACETAMINOPHEN 5; 325 MG/1; MG/1
1 TABLET ORAL EVERY 4 HOURS PRN
Qty: 6 TABLET | Refills: 0 | Status: ON HOLD | OUTPATIENT
Start: 2019-01-01 | End: 2019-01-01 | Stop reason: HOSPADM

## 2019-01-01 RX ORDER — SUCCINYLCHOLINE CHLORIDE 20 MG/ML
INJECTION INTRAMUSCULAR; INTRAVENOUS
Status: DISCONTINUED | OUTPATIENT
Start: 2019-01-01 | End: 2019-01-01

## 2019-01-01 RX ORDER — METOCLOPRAMIDE 10 MG/1
10 TABLET ORAL
Status: DISCONTINUED | OUTPATIENT
Start: 2019-01-01 | End: 2019-01-01 | Stop reason: HOSPADM

## 2019-01-01 RX ORDER — METOCLOPRAMIDE 10 MG/1
5 TABLET ORAL
Qty: 15 TABLET | Refills: 0 | Status: SHIPPED | OUTPATIENT
Start: 2019-01-01 | End: 2019-01-01 | Stop reason: HOSPADM

## 2019-01-01 RX ORDER — ONDANSETRON 2 MG/ML
4 INJECTION INTRAMUSCULAR; INTRAVENOUS
Status: DISCONTINUED | OUTPATIENT
Start: 2019-01-01 | End: 2019-01-01

## 2019-01-01 RX ORDER — ONDANSETRON 2 MG/ML
INJECTION INTRAMUSCULAR; INTRAVENOUS
Status: COMPLETED
Start: 2019-01-01 | End: 2019-01-01

## 2019-01-01 RX ORDER — POTASSIUM CHLORIDE 14.9 MG/ML
20 INJECTION INTRAVENOUS ONCE
Status: DISCONTINUED | OUTPATIENT
Start: 2019-01-01 | End: 2019-01-01

## 2019-01-01 RX ORDER — CARVEDILOL 3.12 MG/1
3.12 TABLET ORAL 2 TIMES DAILY WITH MEALS
Qty: 60 TABLET | Refills: 11 | Status: ON HOLD | OUTPATIENT
Start: 2019-01-01 | End: 2020-01-01 | Stop reason: HOSPADM

## 2019-01-01 RX ORDER — METOCLOPRAMIDE HYDROCHLORIDE 5 MG/ML
10 INJECTION INTRAMUSCULAR; INTRAVENOUS EVERY 6 HOURS PRN
Status: DISCONTINUED | OUTPATIENT
Start: 2019-01-01 | End: 2019-01-01

## 2019-01-01 RX ORDER — ONDANSETRON 2 MG/ML
8 INJECTION INTRAMUSCULAR; INTRAVENOUS EVERY 6 HOURS
Status: DISCONTINUED | OUTPATIENT
Start: 2019-01-01 | End: 2019-01-01 | Stop reason: HOSPADM

## 2019-01-01 RX ORDER — ONDANSETRON 2 MG/ML
4 INJECTION INTRAMUSCULAR; INTRAVENOUS EVERY 12 HOURS
Status: DISCONTINUED | OUTPATIENT
Start: 2019-01-01 | End: 2019-01-01 | Stop reason: HOSPADM

## 2019-01-01 RX ORDER — HYDROCODONE BITARTRATE AND ACETAMINOPHEN 10; 325 MG/1; MG/1
1 TABLET ORAL EVERY 4 HOURS PRN
Status: DISCONTINUED | OUTPATIENT
Start: 2019-01-01 | End: 2019-01-01 | Stop reason: HOSPADM

## 2019-01-01 RX ADMIN — SODIUM CHLORIDE: 0.9 INJECTION, SOLUTION INTRAVENOUS at 04:09

## 2019-01-01 RX ADMIN — SODIUM CHLORIDE 500 ML: 0.9 INJECTION, SOLUTION INTRAVENOUS at 06:09

## 2019-01-01 RX ADMIN — PANTOPRAZOLE SODIUM 40 MG: 40 TABLET, DELAYED RELEASE ORAL at 03:10

## 2019-01-01 RX ADMIN — SEVELAMER CARBONATE 1600 MG: 800 TABLET, FILM COATED ORAL at 11:09

## 2019-01-01 RX ADMIN — CARVEDILOL 3.12 MG: 3.12 TABLET, FILM COATED ORAL at 09:11

## 2019-01-01 RX ADMIN — PANTOPRAZOLE SODIUM 80 MG: 40 INJECTION, POWDER, FOR SOLUTION INTRAVENOUS at 05:11

## 2019-01-01 RX ADMIN — CARVEDILOL 3.12 MG: 3.12 TABLET, FILM COATED ORAL at 07:11

## 2019-01-01 RX ADMIN — PANTOPRAZOLE SODIUM 40 MG: 40 INJECTION, POWDER, LYOPHILIZED, FOR SOLUTION INTRAVENOUS at 09:09

## 2019-01-01 RX ADMIN — ONDANSETRON 4 MG: 2 INJECTION INTRAMUSCULAR; INTRAVENOUS at 06:08

## 2019-01-01 RX ADMIN — ONDANSETRON 8 MG: 2 INJECTION INTRAMUSCULAR; INTRAVENOUS at 05:08

## 2019-01-01 RX ADMIN — PROMETHAZINE HYDROCHLORIDE 6.25 MG: 25 INJECTION INTRAMUSCULAR; INTRAVENOUS at 10:08

## 2019-01-01 RX ADMIN — SUCRALFATE 0.5 G: 1 SUSPENSION ORAL at 08:10

## 2019-01-01 RX ADMIN — SUCRALFATE 0.5 G: 1 SUSPENSION ORAL at 09:08

## 2019-01-01 RX ADMIN — ONDANSETRON 4 MG: 2 INJECTION INTRAMUSCULAR; INTRAVENOUS at 10:10

## 2019-01-01 RX ADMIN — ONDANSETRON 4 MG: 2 INJECTION INTRAMUSCULAR; INTRAVENOUS at 05:09

## 2019-01-01 RX ADMIN — SODIUM CHLORIDE 250 ML: 0.9 INJECTION, SOLUTION INTRAVENOUS at 11:10

## 2019-01-01 RX ADMIN — SODIUM CHLORIDE: 0.9 INJECTION, SOLUTION INTRAVENOUS at 11:09

## 2019-01-01 RX ADMIN — ONDANSETRON 4 MG: 2 INJECTION INTRAMUSCULAR; INTRAVENOUS at 11:09

## 2019-01-01 RX ADMIN — SEVELAMER CARBONATE 1600 MG: 800 TABLET, FILM COATED ORAL at 08:10

## 2019-01-01 RX ADMIN — METOCLOPRAMIDE 10 MG: 5 INJECTION, SOLUTION INTRAMUSCULAR; INTRAVENOUS at 05:09

## 2019-01-01 RX ADMIN — SODIUM CHLORIDE: 0.9 INJECTION, SOLUTION INTRAVENOUS at 01:09

## 2019-01-01 RX ADMIN — METOCLOPRAMIDE 10 MG: 5 INJECTION, SOLUTION INTRAMUSCULAR; INTRAVENOUS at 03:08

## 2019-01-01 RX ADMIN — ONDANSETRON 8 MG: 2 INJECTION INTRAMUSCULAR; INTRAVENOUS at 07:09

## 2019-01-01 RX ADMIN — METOCLOPRAMIDE HYDROCHLORIDE 10 MG: 5 TABLET ORAL at 05:10

## 2019-01-01 RX ADMIN — SENNOSIDES AND DOCUSATE SODIUM 1 TABLET: 8.6; 5 TABLET ORAL at 09:11

## 2019-01-01 RX ADMIN — SENNOSIDES AND DOCUSATE SODIUM 1 TABLET: 8.6; 5 TABLET ORAL at 01:11

## 2019-01-01 RX ADMIN — PANTOPRAZOLE SODIUM 40 MG: 40 TABLET, DELAYED RELEASE ORAL at 05:10

## 2019-01-01 RX ADMIN — CARVEDILOL 3.12 MG: 3.12 TABLET, FILM COATED ORAL at 08:11

## 2019-01-01 RX ADMIN — SEVELAMER CARBONATE 1600 MG: 800 TABLET, FILM COATED ORAL at 08:09

## 2019-01-01 RX ADMIN — SODIUM CHLORIDE 500 ML: 0.9 INJECTION, SOLUTION INTRAVENOUS at 10:08

## 2019-01-01 RX ADMIN — METOCLOPRAMIDE 10 MG: 10 TABLET ORAL at 05:09

## 2019-01-01 RX ADMIN — ONDANSETRON 4 MG: 2 INJECTION INTRAMUSCULAR; INTRAVENOUS at 12:09

## 2019-01-01 RX ADMIN — POTASSIUM CHLORIDE 40 MEQ: 20 SOLUTION ORAL at 09:09

## 2019-01-01 RX ADMIN — ONDANSETRON 4 MG: 2 INJECTION INTRAMUSCULAR; INTRAVENOUS at 05:08

## 2019-01-01 RX ADMIN — METOCLOPRAMIDE 10 MG: 5 INJECTION, SOLUTION INTRAMUSCULAR; INTRAVENOUS at 01:11

## 2019-01-01 RX ADMIN — METOCLOPRAMIDE 5 MG: 5 TABLET ORAL at 05:09

## 2019-01-01 RX ADMIN — ONDANSETRON 8 MG: 8 TABLET, ORALLY DISINTEGRATING ORAL at 05:11

## 2019-01-01 RX ADMIN — Medication 10 ML: at 09:11

## 2019-01-01 RX ADMIN — LORAZEPAM 0.5 MG: 2 INJECTION INTRAMUSCULAR; INTRAVENOUS at 09:08

## 2019-01-01 RX ADMIN — POTASSIUM CHLORIDE 50 MEQ: 750 CAPSULE, EXTENDED RELEASE ORAL at 11:09

## 2019-01-01 RX ADMIN — METOCLOPRAMIDE 10 MG: 5 INJECTION, SOLUTION INTRAMUSCULAR; INTRAVENOUS at 11:09

## 2019-01-01 RX ADMIN — ONDANSETRON 8 MG: 2 INJECTION INTRAMUSCULAR; INTRAVENOUS at 03:08

## 2019-01-01 RX ADMIN — PANTOPRAZOLE SODIUM 40 MG: 40 INJECTION, POWDER, FOR SOLUTION INTRAVENOUS at 08:10

## 2019-01-01 RX ADMIN — POLYETHYLENE GLYCOL 3350, SODIUM SULFATE ANHYDROUS, SODIUM BICARBONATE, SODIUM CHLORIDE, POTASSIUM CHLORIDE 4000 ML: 236; 22.74; 6.74; 5.86; 2.97 POWDER, FOR SOLUTION ORAL at 04:11

## 2019-01-01 RX ADMIN — PANTOPRAZOLE SODIUM 40 MG: 40 TABLET, DELAYED RELEASE ORAL at 05:09

## 2019-01-01 RX ADMIN — SODIUM CHLORIDE 500 ML: 0.9 INJECTION, SOLUTION INTRAVENOUS at 12:09

## 2019-01-01 RX ADMIN — ONDANSETRON 4 MG: 2 INJECTION INTRAMUSCULAR; INTRAVENOUS at 11:08

## 2019-01-01 RX ADMIN — SEVELAMER CARBONATE 1600 MG: 800 TABLET, FILM COATED ORAL at 09:08

## 2019-01-01 RX ADMIN — METOCLOPRAMIDE 10 MG: 10 TABLET ORAL at 06:09

## 2019-01-01 RX ADMIN — CARVEDILOL 3.12 MG: 3.12 TABLET, FILM COATED ORAL at 09:09

## 2019-01-01 RX ADMIN — CARVEDILOL 3.12 MG: 3.12 TABLET, FILM COATED ORAL at 05:09

## 2019-01-01 RX ADMIN — PANTOPRAZOLE SODIUM 40 MG: 40 INJECTION, POWDER, FOR SOLUTION INTRAVENOUS at 10:10

## 2019-01-01 RX ADMIN — SODIUM CHLORIDE 500 ML: 0.9 INJECTION, SOLUTION INTRAVENOUS at 09:09

## 2019-01-01 RX ADMIN — SODIUM CHLORIDE 250 ML: 0.9 INJECTION, SOLUTION INTRAVENOUS at 10:10

## 2019-01-01 RX ADMIN — METOCLOPRAMIDE 5 MG: 5 TABLET ORAL at 04:08

## 2019-01-01 RX ADMIN — SODIUM CHLORIDE 350 ML: 0.9 INJECTION, SOLUTION INTRAVENOUS at 10:11

## 2019-01-01 RX ADMIN — SODIUM CHLORIDE: 9 INJECTION, SOLUTION INTRAVENOUS at 11:11

## 2019-01-01 RX ADMIN — METOCLOPRAMIDE 5 MG: 5 INJECTION, SOLUTION INTRAMUSCULAR; INTRAVENOUS at 04:08

## 2019-01-01 RX ADMIN — SUCRALFATE 0.5 G: 1 SUSPENSION ORAL at 10:10

## 2019-01-01 RX ADMIN — METOCLOPRAMIDE HYDROCHLORIDE 10 MG: 5 TABLET ORAL at 08:10

## 2019-01-01 RX ADMIN — PANTOPRAZOLE SODIUM 40 MG: 40 INJECTION, POWDER, FOR SOLUTION INTRAVENOUS at 09:10

## 2019-01-01 RX ADMIN — METOCLOPRAMIDE HYDROCHLORIDE 10 MG: 5 TABLET ORAL at 09:10

## 2019-01-01 RX ADMIN — ONDANSETRON 4 MG: 2 INJECTION INTRAMUSCULAR; INTRAVENOUS at 06:09

## 2019-01-01 RX ADMIN — PANTOPRAZOLE SODIUM 40 MG: 40 INJECTION, POWDER, LYOPHILIZED, FOR SOLUTION INTRAVENOUS at 09:08

## 2019-01-01 RX ADMIN — PANTOPRAZOLE SODIUM 40 MG: 40 INJECTION, POWDER, FOR SOLUTION INTRAVENOUS at 06:10

## 2019-01-01 RX ADMIN — SODIUM CHLORIDE 1000 ML: 0.9 INJECTION, SOLUTION INTRAVENOUS at 08:11

## 2019-01-01 RX ADMIN — PROMETHAZINE HYDROCHLORIDE 12.5 MG: 25 INJECTION INTRAMUSCULAR; INTRAVENOUS at 05:08

## 2019-01-01 RX ADMIN — SODIUM CHLORIDE: 0.9 INJECTION, SOLUTION INTRAVENOUS at 03:08

## 2019-01-01 RX ADMIN — ONDANSETRON 8 MG: 2 INJECTION INTRAMUSCULAR; INTRAVENOUS at 08:08

## 2019-01-01 RX ADMIN — ONDANSETRON 8 MG: 2 INJECTION INTRAMUSCULAR; INTRAVENOUS at 12:08

## 2019-01-01 RX ADMIN — ONDANSETRON 4 MG: 2 INJECTION INTRAMUSCULAR; INTRAVENOUS at 11:11

## 2019-01-01 RX ADMIN — CARVEDILOL 3.12 MG: 3.12 TABLET, FILM COATED ORAL at 01:11

## 2019-01-01 RX ADMIN — Medication 100 ML: at 06:09

## 2019-01-01 RX ADMIN — PHENYLEPHRINE HYDROCHLORIDE 100 MCG: 10 INJECTION INTRAVENOUS at 11:10

## 2019-01-01 RX ADMIN — SUCRALFATE 0.5 G: 1 SUSPENSION ORAL at 09:09

## 2019-01-01 RX ADMIN — PANTOPRAZOLE SODIUM 40 MG: 40 TABLET, DELAYED RELEASE ORAL at 04:11

## 2019-01-01 RX ADMIN — PROPOFOL 100 MG: 10 INJECTION, EMULSION INTRAVENOUS at 11:09

## 2019-01-01 RX ADMIN — LIDOCAINE HYDROCHLORIDE 100 MG: 20 INJECTION, SOLUTION INTRAVENOUS at 11:10

## 2019-01-01 RX ADMIN — SODIUM CHLORIDE: 0.9 INJECTION, SOLUTION INTRAVENOUS at 10:11

## 2019-01-01 RX ADMIN — PANTOPRAZOLE SODIUM 40 MG: 40 TABLET, DELAYED RELEASE ORAL at 04:08

## 2019-01-01 RX ADMIN — ONDANSETRON 8 MG: 2 INJECTION INTRAMUSCULAR; INTRAVENOUS at 06:08

## 2019-01-01 RX ADMIN — PHENYLEPHRINE HYDROCHLORIDE 100 MCG: 10 INJECTION INTRAVENOUS at 11:11

## 2019-01-01 RX ADMIN — SUCRALFATE 0.5 G: 1 SUSPENSION ORAL at 12:10

## 2019-01-01 RX ADMIN — ATORVASTATIN CALCIUM 80 MG: 20 TABLET, FILM COATED ORAL at 09:09

## 2019-01-01 RX ADMIN — PANTOPRAZOLE SODIUM 40 MG: 40 INJECTION, POWDER, FOR SOLUTION INTRAVENOUS at 09:11

## 2019-01-01 RX ADMIN — ONDANSETRON 8 MG: 2 INJECTION INTRAMUSCULAR; INTRAVENOUS at 10:08

## 2019-01-01 RX ADMIN — SODIUM CHLORIDE: 0.9 INJECTION, SOLUTION INTRAVENOUS at 03:10

## 2019-01-01 RX ADMIN — ATORVASTATIN CALCIUM 80 MG: 20 TABLET, FILM COATED ORAL at 08:09

## 2019-01-01 RX ADMIN — SUCRALFATE 0.5 G: 1 SUSPENSION ORAL at 08:09

## 2019-01-01 RX ADMIN — METOCLOPRAMIDE HYDROCHLORIDE 10 MG: 5 TABLET ORAL at 05:09

## 2019-01-01 RX ADMIN — POTASSIUM CHLORIDE 40 MEQ: 20 SOLUTION ORAL at 08:09

## 2019-01-01 RX ADMIN — Medication 10 ML: at 06:11

## 2019-01-01 RX ADMIN — FAMOTIDINE 20 MG: 10 INJECTION, SOLUTION INTRAVENOUS at 01:11

## 2019-01-01 RX ADMIN — GLYCOPYRROLATE 0.2 MG: 0.2 INJECTION, SOLUTION INTRAMUSCULAR; INTRAVENOUS at 11:09

## 2019-01-01 RX ADMIN — PROMETHAZINE HYDROCHLORIDE 12.5 MG: 25 INJECTION INTRAMUSCULAR; INTRAVENOUS at 10:08

## 2019-01-01 RX ADMIN — CARVEDILOL 3.12 MG: 3.12 TABLET, FILM COATED ORAL at 08:09

## 2019-01-01 RX ADMIN — PROPOFOL 100 MG: 10 INJECTION, EMULSION INTRAVENOUS at 11:11

## 2019-01-01 RX ADMIN — PANTOPRAZOLE SODIUM 40 MG: 40 TABLET, DELAYED RELEASE ORAL at 06:11

## 2019-01-01 RX ADMIN — SODIUM CHLORIDE: 0.9 INJECTION, SOLUTION INTRAVENOUS at 11:10

## 2019-01-01 RX ADMIN — SEVELAMER CARBONATE 1600 MG: 800 TABLET, FILM COATED ORAL at 09:09

## 2019-01-01 RX ADMIN — SEVELAMER CARBONATE 1600 MG: 800 TABLET, FILM COATED ORAL at 04:10

## 2019-01-01 RX ADMIN — PANTOPRAZOLE SODIUM 40 MG: 40 TABLET, DELAYED RELEASE ORAL at 04:10

## 2019-01-01 RX ADMIN — CARVEDILOL 3.12 MG: 3.12 TABLET, FILM COATED ORAL at 04:09

## 2019-01-01 RX ADMIN — SEVELAMER CARBONATE 1600 MG: 800 TABLET, FILM COATED ORAL at 12:10

## 2019-01-01 RX ADMIN — ONDANSETRON 4 MG: 2 INJECTION INTRAMUSCULAR; INTRAVENOUS at 09:10

## 2019-01-01 RX ADMIN — METOCLOPRAMIDE 5 MG: 5 INJECTION, SOLUTION INTRAMUSCULAR; INTRAVENOUS at 11:08

## 2019-01-01 RX ADMIN — POTASSIUM & SODIUM PHOSPHATES POWDER PACK 280-160-250 MG 2 PACKET: 280-160-250 PACK at 12:10

## 2019-01-01 RX ADMIN — SEVELAMER CARBONATE 1600 MG: 800 TABLET, FILM COATED ORAL at 01:08

## 2019-01-01 RX ADMIN — SODIUM CHLORIDE 500 ML: 0.9 INJECTION, SOLUTION INTRAVENOUS at 06:11

## 2019-01-01 RX ADMIN — SEVELAMER CARBONATE 1600 MG: 800 TABLET, FILM COATED ORAL at 05:09

## 2019-01-01 RX ADMIN — CARVEDILOL 3.12 MG: 3.12 TABLET, FILM COATED ORAL at 06:11

## 2019-01-01 RX ADMIN — ONDANSETRON 4 MG: 2 INJECTION INTRAMUSCULAR; INTRAVENOUS at 11:10

## 2019-01-01 RX ADMIN — PANTOPRAZOLE SODIUM 40 MG: 40 TABLET, DELAYED RELEASE ORAL at 04:09

## 2019-01-01 RX ADMIN — METOCLOPRAMIDE HYDROCHLORIDE 10 MG: 5 TABLET ORAL at 04:10

## 2019-01-01 RX ADMIN — ONDANSETRON 8 MG: 8 TABLET, ORALLY DISINTEGRATING ORAL at 06:08

## 2019-01-01 RX ADMIN — PANTOPRAZOLE SODIUM 40 MG: 40 TABLET, DELAYED RELEASE ORAL at 09:09

## 2019-01-01 RX ADMIN — ONDANSETRON 8 MG: 2 INJECTION INTRAMUSCULAR; INTRAVENOUS at 10:09

## 2019-01-01 RX ADMIN — VANCOMYCIN HYDROCHLORIDE 1250 MG: 1.25 INJECTION, POWDER, LYOPHILIZED, FOR SOLUTION INTRAVENOUS at 10:09

## 2019-01-01 RX ADMIN — PANTOPRAZOLE SODIUM 40 MG: 40 INJECTION, POWDER, LYOPHILIZED, FOR SOLUTION INTRAVENOUS at 08:09

## 2019-01-01 RX ADMIN — ONDANSETRON 8 MG: 2 INJECTION INTRAMUSCULAR; INTRAVENOUS at 09:08

## 2019-01-01 RX ADMIN — PANTOPRAZOLE SODIUM 40 MG: 40 INJECTION, POWDER, FOR SOLUTION INTRAVENOUS at 01:11

## 2019-01-01 RX ADMIN — PROPOFOL 50 MG: 10 INJECTION, EMULSION INTRAVENOUS at 11:11

## 2019-01-01 RX ADMIN — PANTOPRAZOLE SODIUM 40 MG: 40 INJECTION, POWDER, FOR SOLUTION INTRAVENOUS at 01:10

## 2019-01-01 RX ADMIN — IOHEXOL 75 ML: 350 INJECTION, SOLUTION INTRAVENOUS at 09:09

## 2019-01-01 RX ADMIN — PANTOPRAZOLE SODIUM 40 MG: 40 TABLET, DELAYED RELEASE ORAL at 06:08

## 2019-01-01 RX ADMIN — SODIUM CHLORIDE, SODIUM LACTATE, POTASSIUM CHLORIDE, AND CALCIUM CHLORIDE 500 ML: .6; .31; .03; .02 INJECTION, SOLUTION INTRAVENOUS at 07:09

## 2019-01-01 RX ADMIN — LIDOCAINE HYDROCHLORIDE 60 MG: 20 INJECTION, SOLUTION INTRAVENOUS at 11:09

## 2019-01-01 RX ADMIN — METOCLOPRAMIDE HYDROCHLORIDE 10 MG: 5 TABLET ORAL at 07:10

## 2019-01-01 RX ADMIN — PANTOPRAZOLE SODIUM 40 MG: 40 INJECTION, POWDER, FOR SOLUTION INTRAVENOUS at 08:11

## 2019-01-01 RX ADMIN — FENTANYL CITRATE 50 MCG: 50 INJECTION, SOLUTION INTRAMUSCULAR; INTRAVENOUS at 11:10

## 2019-01-01 RX ADMIN — SODIUM PHOSPHATE, MONOBASIC, MONOHYDRATE 30 MMOL: 276; 142 INJECTION, SOLUTION INTRAVENOUS at 06:09

## 2019-01-01 RX ADMIN — SUCRALFATE 0.5 G: 1 SUSPENSION ORAL at 09:10

## 2019-01-01 RX ADMIN — SODIUM CHLORIDE 500 ML: 0.9 INJECTION, SOLUTION INTRAVENOUS at 05:09

## 2019-01-01 RX ADMIN — Medication 5 UNITS: at 03:10

## 2019-01-01 RX ADMIN — PANTOPRAZOLE SODIUM 80 MG: 40 INJECTION, POWDER, LYOPHILIZED, FOR SOLUTION INTRAVENOUS at 09:08

## 2019-01-01 RX ADMIN — HALOPERIDOL LACTATE 1 MG: 5 INJECTION INTRAMUSCULAR at 06:08

## 2019-01-01 RX ADMIN — LIDOCAINE HYDROCHLORIDE 40 MG: 20 INJECTION, SOLUTION INTRAVENOUS at 11:09

## 2019-01-01 RX ADMIN — PANTOPRAZOLE SODIUM 80 MG: 40 INJECTION, POWDER, LYOPHILIZED, FOR SOLUTION INTRAVENOUS at 06:08

## 2019-01-01 RX ADMIN — FENTANYL CITRATE 25 MCG: 50 INJECTION, SOLUTION INTRAMUSCULAR; INTRAVENOUS at 11:11

## 2019-01-01 RX ADMIN — PANTOPRAZOLE SODIUM 40 MG: 40 TABLET, DELAYED RELEASE ORAL at 06:10

## 2019-01-01 RX ADMIN — METOCLOPRAMIDE HYDROCHLORIDE 10 MG: 5 TABLET ORAL at 12:10

## 2019-01-01 RX ADMIN — METOCLOPRAMIDE 10 MG: 5 INJECTION, SOLUTION INTRAMUSCULAR; INTRAVENOUS at 09:09

## 2019-01-01 RX ADMIN — ONDANSETRON 8 MG: 8 TABLET, ORALLY DISINTEGRATING ORAL at 09:11

## 2019-01-01 RX ADMIN — PROMETHAZINE HYDROCHLORIDE 12.5 MG: 25 INJECTION INTRAMUSCULAR; INTRAVENOUS at 06:08

## 2019-01-01 RX ADMIN — SEVELAMER CARBONATE 1600 MG: 800 TABLET, FILM COATED ORAL at 04:09

## 2019-01-01 RX ADMIN — ONDANSETRON 4 MG: 2 INJECTION INTRAMUSCULAR; INTRAVENOUS at 01:11

## 2019-01-01 RX ADMIN — POTASSIUM CHLORIDE 30 MEQ: 750 CAPSULE, EXTENDED RELEASE ORAL at 08:08

## 2019-01-01 RX ADMIN — METOCLOPRAMIDE HYDROCHLORIDE 10 MG: 5 TABLET ORAL at 11:09

## 2019-01-01 RX ADMIN — CARVEDILOL 3.12 MG: 3.12 TABLET, FILM COATED ORAL at 10:11

## 2019-01-01 RX ADMIN — FENTANYL CITRATE 25 MCG: 50 INJECTION, SOLUTION INTRAMUSCULAR; INTRAVENOUS at 11:09

## 2019-01-01 RX ADMIN — METOCLOPRAMIDE HYDROCHLORIDE 10 MG: 10 INJECTION, SOLUTION INTRAMUSCULAR; INTRAVENOUS at 04:11

## 2019-01-01 RX ADMIN — PANTOPRAZOLE SODIUM 80 MG: 40 INJECTION, POWDER, LYOPHILIZED, FOR SOLUTION INTRAVENOUS at 05:09

## 2019-01-01 RX ADMIN — CHLORPROMAZINE HYDROCHLORIDE 25 MG: 25 TABLET, SUGAR COATED ORAL at 07:10

## 2019-01-01 RX ADMIN — METOCLOPRAMIDE HYDROCHLORIDE 10 MG: 5 TABLET ORAL at 06:10

## 2019-01-01 RX ADMIN — PANTOPRAZOLE SODIUM 40 MG: 40 INJECTION, POWDER, FOR SOLUTION INTRAVENOUS at 10:09

## 2019-01-01 RX ADMIN — METOCLOPRAMIDE 5 MG: 5 TABLET ORAL at 10:09

## 2019-01-01 RX ADMIN — PROPOFOL 50 MG: 10 INJECTION, EMULSION INTRAVENOUS at 11:09

## 2019-01-01 RX ADMIN — PROPOFOL 120 MG: 10 INJECTION, EMULSION INTRAVENOUS at 11:10

## 2019-01-01 RX ADMIN — PROCHLORPERAZINE EDISYLATE 10 MG: 5 INJECTION INTRAMUSCULAR; INTRAVENOUS at 09:09

## 2019-01-01 RX ADMIN — LABETALOL HCL IV SOLN PREFILLED SYRINGE 20 MG/4ML (5 MG/ML) 10 MG: 20/4 SOLUTION PREFILLED SYRINGE at 01:08

## 2019-01-01 RX ADMIN — OCTREOTIDE ACETATE 50 MCG: 100 INJECTION, SOLUTION INTRAVENOUS; SUBCUTANEOUS at 08:08

## 2019-01-01 RX ADMIN — PANTOPRAZOLE SODIUM 40 MG: 40 TABLET, DELAYED RELEASE ORAL at 06:09

## 2019-01-01 RX ADMIN — METOCLOPRAMIDE 5 MG: 5 INJECTION, SOLUTION INTRAMUSCULAR; INTRAVENOUS at 02:11

## 2019-01-01 RX ADMIN — ONDANSETRON 4 MG: 2 INJECTION INTRAMUSCULAR; INTRAVENOUS at 01:08

## 2019-01-01 RX ADMIN — LIDOCAINE HYDROCHLORIDE 100 MG: 20 INJECTION, SOLUTION INTRAVENOUS at 11:11

## 2019-01-01 RX ADMIN — ATORVASTATIN CALCIUM 80 MG: 20 TABLET, FILM COATED ORAL at 09:08

## 2019-01-01 RX ADMIN — METOCLOPRAMIDE HYDROCHLORIDE 10 MG: 5 TABLET ORAL at 04:09

## 2019-01-01 RX ADMIN — PROMETHAZINE HYDROCHLORIDE 6.25 MG: 25 INJECTION INTRAMUSCULAR; INTRAVENOUS at 05:08

## 2019-01-01 RX ADMIN — CARVEDILOL 3.12 MG: 3.12 TABLET, FILM COATED ORAL at 02:11

## 2019-01-01 RX ADMIN — METOCLOPRAMIDE 5 MG: 5 TABLET ORAL at 11:08

## 2019-01-01 RX ADMIN — ONDANSETRON 4 MG: 2 INJECTION INTRAMUSCULAR; INTRAVENOUS at 05:10

## 2019-01-01 RX ADMIN — METOCLOPRAMIDE 5 MG: 5 TABLET ORAL at 06:08

## 2019-01-01 RX ADMIN — SODIUM CHLORIDE 350 ML: 0.9 INJECTION, SOLUTION INTRAVENOUS at 01:09

## 2019-01-01 RX ADMIN — SODIUM CHLORIDE 1000 ML: 0.9 INJECTION, SOLUTION INTRAVENOUS at 01:11

## 2019-01-01 RX ADMIN — POLYETHYLENE GLYCOL 3350, SODIUM SULFATE ANHYDROUS, SODIUM BICARBONATE, SODIUM CHLORIDE, POTASSIUM CHLORIDE 4000 ML: 236; 22.74; 6.74; 5.86; 2.97 POWDER, FOR SOLUTION ORAL at 05:11

## 2019-01-01 RX ADMIN — SODIUM CHLORIDE 500 ML: 0.9 INJECTION, SOLUTION INTRAVENOUS at 07:09

## 2019-01-01 RX ADMIN — METOCLOPRAMIDE HYDROCHLORIDE 10 MG: 5 TABLET ORAL at 03:10

## 2019-01-01 RX ADMIN — GLYCOPYRROLATE 0.2 MG: 0.2 INJECTION, SOLUTION INTRAMUSCULAR; INTRAVENOUS at 11:11

## 2019-01-01 RX ADMIN — SUCCINYLCHOLINE CHLORIDE 140 MG: 20 INJECTION, SOLUTION INTRAMUSCULAR; INTRAVENOUS at 11:10

## 2019-01-01 RX ADMIN — METOCLOPRAMIDE HYDROCHLORIDE 10 MG: 5 TABLET ORAL at 10:10

## 2019-01-01 RX ADMIN — ONDANSETRON 4 MG: 2 INJECTION INTRAMUSCULAR; INTRAVENOUS at 08:10

## 2019-01-01 RX ADMIN — SENNOSIDES AND DOCUSATE SODIUM 1 TABLET: 8.6; 5 TABLET ORAL at 07:11

## 2019-01-01 RX ADMIN — MAGNESIUM CITRATE 296 ML: 1.75 LIQUID ORAL at 06:09

## 2019-01-01 RX ADMIN — POTASSIUM CHLORIDE 10 MEQ: 7.46 INJECTION, SOLUTION INTRAVENOUS at 09:09

## 2019-02-07 LAB
ACID FAST MOD KINY STN SPEC: NORMAL
MYCOBACTERIUM SPEC QL CULT: NORMAL

## 2019-02-10 LAB
ACID FAST MOD KINY STN SPEC: NORMAL
MYCOBACTERIUM SPEC QL CULT: NORMAL

## 2019-03-06 ENCOUNTER — HOSPITAL ENCOUNTER (OUTPATIENT)
Facility: HOSPITAL | Age: 54
Discharge: HOME | End: 2019-03-07
Attending: EMERGENCY MEDICINE | Admitting: HOSPITALIST
Payer: MEDICARE

## 2019-03-06 DIAGNOSIS — R11.2 INTRACTABLE VOMITING WITH NAUSEA: Primary | ICD-10-CM

## 2019-03-06 DIAGNOSIS — I15.0 RENOVASCULAR HYPERTENSION: Chronic | ICD-10-CM

## 2019-03-06 DIAGNOSIS — R11.2 NAUSEA & VOMITING: ICD-10-CM

## 2019-03-06 DIAGNOSIS — Z99.2 ESRD ON HEMODIALYSIS: Chronic | ICD-10-CM

## 2019-03-06 DIAGNOSIS — K29.60 EROSIVE GASTRITIS: ICD-10-CM

## 2019-03-06 DIAGNOSIS — Z99.2 CONTROLLED TYPE 2 DIABETES MELLITUS WITH CHRONIC KIDNEY DISEASE ON CHRONIC DIALYSIS, WITHOUT LONG-TERM CURRENT USE OF INSULIN: Chronic | ICD-10-CM

## 2019-03-06 DIAGNOSIS — N18.6 ANEMIA IN CHRONIC KIDNEY DISEASE, ON CHRONIC DIALYSIS: Chronic | ICD-10-CM

## 2019-03-06 DIAGNOSIS — D63.1 ANEMIA IN CHRONIC KIDNEY DISEASE, ON CHRONIC DIALYSIS: Chronic | ICD-10-CM

## 2019-03-06 DIAGNOSIS — R53.1 WEAKNESS: ICD-10-CM

## 2019-03-06 DIAGNOSIS — K92.2 GASTROINTESTINAL HEMORRHAGE, UNSPECIFIED GASTROINTESTINAL HEMORRHAGE TYPE: ICD-10-CM

## 2019-03-06 DIAGNOSIS — K20.90 ESOPHAGITIS: ICD-10-CM

## 2019-03-06 DIAGNOSIS — E11.51 PERIPHERAL VASCULAR DISEASE IN DIABETES MELLITUS: Chronic | ICD-10-CM

## 2019-03-06 DIAGNOSIS — N18.6 ESRD ON HEMODIALYSIS: Chronic | ICD-10-CM

## 2019-03-06 DIAGNOSIS — E11.22 CONTROLLED TYPE 2 DIABETES MELLITUS WITH CHRONIC KIDNEY DISEASE ON CHRONIC DIALYSIS, WITHOUT LONG-TERM CURRENT USE OF INSULIN: Chronic | ICD-10-CM

## 2019-03-06 DIAGNOSIS — N18.6 CONTROLLED TYPE 2 DIABETES MELLITUS WITH CHRONIC KIDNEY DISEASE ON CHRONIC DIALYSIS, WITHOUT LONG-TERM CURRENT USE OF INSULIN: Chronic | ICD-10-CM

## 2019-03-06 DIAGNOSIS — K92.2 CHRONIC UPPER GI BLEEDING: ICD-10-CM

## 2019-03-06 DIAGNOSIS — Z99.2 ANEMIA IN CHRONIC KIDNEY DISEASE, ON CHRONIC DIALYSIS: Chronic | ICD-10-CM

## 2019-03-06 PROBLEM — Z87.19 HISTORY OF GI BLEED: Status: ACTIVE | Noted: 2018-05-22

## 2019-03-06 LAB
ALBUMIN SERPL BCP-MCNC: 3.3 G/DL
ALP SERPL-CCNC: 143 U/L
ALT SERPL W/O P-5'-P-CCNC: 17 U/L
ANION GAP SERPL CALC-SCNC: 23 MMOL/L
AST SERPL-CCNC: 23 U/L
BASOPHILS # BLD AUTO: 0.03 K/UL
BASOPHILS NFR BLD: 0.3 %
BILIRUB SERPL-MCNC: 0.5 MG/DL
BNP SERPL-MCNC: 45 PG/ML
BUN SERPL-MCNC: 102 MG/DL
BUN SERPL-MCNC: 96 MG/DL (ref 6–30)
CALCIUM SERPL-MCNC: 10.9 MG/DL
CHLORIDE SERPL-SCNC: 80 MMOL/L
CHLORIDE SERPL-SCNC: 82 MMOL/L (ref 95–110)
CO2 SERPL-SCNC: 29 MMOL/L
CREAT SERPL-MCNC: 11.1 MG/DL (ref 0.5–1.4)
CREAT SERPL-MCNC: 11.2 MG/DL
DIFFERENTIAL METHOD: ABNORMAL
EOSINOPHIL # BLD AUTO: 0.1 K/UL
EOSINOPHIL NFR BLD: 1.3 %
ERYTHROCYTE [DISTWIDTH] IN BLOOD BY AUTOMATED COUNT: 15.1 %
EST. GFR  (AFRICAN AMERICAN): 5.3 ML/MIN/1.73 M^2
EST. GFR  (NON AFRICAN AMERICAN): 4.6 ML/MIN/1.73 M^2
FERRITIN SERPL-MCNC: 1304 NG/ML
GLUCOSE SERPL-MCNC: 184 MG/DL
GLUCOSE SERPL-MCNC: 187 MG/DL (ref 70–110)
HCT VFR BLD AUTO: 29 %
HCT VFR BLD CALC: 29 %PCV (ref 36–54)
HGB BLD-MCNC: 10.1 G/DL
IMM GRANULOCYTES # BLD AUTO: 0.03 K/UL
IMM GRANULOCYTES NFR BLD AUTO: 0.3 %
INR PPP: 1
IRON SERPL-MCNC: 84 UG/DL
LACTATE SERPL-SCNC: 1.1 MMOL/L
LIPASE SERPL-CCNC: 44 U/L
LYMPHOCYTES # BLD AUTO: 2.3 K/UL
LYMPHOCYTES NFR BLD: 23.5 %
MAGNESIUM SERPL-MCNC: 2.8 MG/DL
MCH RBC QN AUTO: 31.6 PG
MCHC RBC AUTO-ENTMCNC: 34.8 G/DL
MCV RBC AUTO: 91 FL
MONOCYTES # BLD AUTO: 1.3 K/UL
MONOCYTES NFR BLD: 13.3 %
NEUTROPHILS # BLD AUTO: 6 K/UL
NEUTROPHILS NFR BLD: 61.3 %
NRBC BLD-RTO: 0 /100 WBC
PHOSPHATE SERPL-MCNC: 3.7 MG/DL
PLATELET # BLD AUTO: 183 K/UL
PMV BLD AUTO: 13.1 FL
POC IONIZED CALCIUM: 1.08 MMOL/L (ref 1.06–1.42)
POC TCO2 (MEASURED): 38 MMOL/L (ref 23–29)
POCT GLUCOSE: 219 MG/DL (ref 70–110)
POTASSIUM BLD-SCNC: 4.4 MMOL/L (ref 3.5–5.1)
POTASSIUM SERPL-SCNC: 4.6 MMOL/L
PROT SERPL-MCNC: 8.6 G/DL
PROTHROMBIN TIME: 10.5 SEC
RBC # BLD AUTO: 3.2 M/UL
RETICS/RBC NFR AUTO: 1.9 %
SAMPLE: ABNORMAL
SATURATED IRON: 34 %
SODIUM BLD-SCNC: 129 MMOL/L (ref 136–145)
SODIUM SERPL-SCNC: 132 MMOL/L
TOTAL IRON BINDING CAPACITY: 250 UG/DL
TRANSFERRIN SERPL-MCNC: 169 MG/DL
TROPONIN I SERPL DL<=0.01 NG/ML-MCNC: 0.16 NG/ML
TROPONIN I SERPL DL<=0.01 NG/ML-MCNC: 0.17 NG/ML
TSH SERPL DL<=0.005 MIU/L-ACNC: 0.69 UIU/ML
WBC # BLD AUTO: 9.73 K/UL

## 2019-03-06 PROCEDURE — 83605 ASSAY OF LACTIC ACID: CPT

## 2019-03-06 PROCEDURE — 85610 PROTHROMBIN TIME: CPT

## 2019-03-06 PROCEDURE — G0378 HOSPITAL OBSERVATION PER HR: HCPCS

## 2019-03-06 PROCEDURE — 83540 ASSAY OF IRON: CPT

## 2019-03-06 PROCEDURE — G0257 UNSCHED DIALYSIS ESRD PT HOS: HCPCS

## 2019-03-06 PROCEDURE — 85025 COMPLETE CBC W/AUTO DIFF WBC: CPT

## 2019-03-06 PROCEDURE — 84443 ASSAY THYROID STIM HORMONE: CPT

## 2019-03-06 PROCEDURE — 84484 ASSAY OF TROPONIN QUANT: CPT | Mod: 91

## 2019-03-06 PROCEDURE — 93010 ELECTROCARDIOGRAM REPORT: CPT | Mod: ,,, | Performed by: INTERNAL MEDICINE

## 2019-03-06 PROCEDURE — 82728 ASSAY OF FERRITIN: CPT

## 2019-03-06 PROCEDURE — 25000003 PHARM REV CODE 250: Performed by: HOSPITALIST

## 2019-03-06 PROCEDURE — 99285 PR EMERGENCY DEPT VISIT,LEVEL V: ICD-10-PCS | Mod: ,,, | Performed by: EMERGENCY MEDICINE

## 2019-03-06 PROCEDURE — C9113 INJ PANTOPRAZOLE SODIUM, VIA: HCPCS | Performed by: HOSPITALIST

## 2019-03-06 PROCEDURE — 99219 PR INITIAL OBSERVATION CARE,LEVL II: ICD-10-PCS | Mod: ,,, | Performed by: HOSPITALIST

## 2019-03-06 PROCEDURE — 93010 EKG 12-LEAD: ICD-10-PCS | Mod: ,,, | Performed by: INTERNAL MEDICINE

## 2019-03-06 PROCEDURE — 83880 ASSAY OF NATRIURETIC PEPTIDE: CPT

## 2019-03-06 PROCEDURE — 99284 PR EMERGENCY DEPT VISIT,LEVEL IV: ICD-10-PCS | Mod: GC,,, | Performed by: INTERNAL MEDICINE

## 2019-03-06 PROCEDURE — 80053 COMPREHEN METABOLIC PANEL: CPT

## 2019-03-06 PROCEDURE — 84100 ASSAY OF PHOSPHORUS: CPT

## 2019-03-06 PROCEDURE — 99215 PR OFFICE/OUTPT VISIT, EST, LEVL V, 40-54 MIN: ICD-10-PCS | Mod: ,,, | Performed by: INTERNAL MEDICINE

## 2019-03-06 PROCEDURE — 83735 ASSAY OF MAGNESIUM: CPT

## 2019-03-06 PROCEDURE — 80100016 HC MAINTENANCE HEMODIALYSIS

## 2019-03-06 PROCEDURE — 63600175 PHARM REV CODE 636 W HCPCS: Performed by: HOSPITALIST

## 2019-03-06 PROCEDURE — 82962 GLUCOSE BLOOD TEST: CPT

## 2019-03-06 PROCEDURE — 99219 PR INITIAL OBSERVATION CARE,LEVL II: CPT | Mod: ,,, | Performed by: HOSPITALIST

## 2019-03-06 PROCEDURE — 85045 AUTOMATED RETICULOCYTE COUNT: CPT

## 2019-03-06 PROCEDURE — 93005 ELECTROCARDIOGRAM TRACING: CPT

## 2019-03-06 PROCEDURE — 84484 ASSAY OF TROPONIN QUANT: CPT

## 2019-03-06 PROCEDURE — 99285 EMERGENCY DEPT VISIT HI MDM: CPT | Mod: ,,, | Performed by: EMERGENCY MEDICINE

## 2019-03-06 PROCEDURE — 99285 EMERGENCY DEPT VISIT HI MDM: CPT | Mod: 25

## 2019-03-06 PROCEDURE — 99284 EMERGENCY DEPT VISIT MOD MDM: CPT | Mod: GC,,, | Performed by: INTERNAL MEDICINE

## 2019-03-06 PROCEDURE — 83690 ASSAY OF LIPASE: CPT

## 2019-03-06 PROCEDURE — 99215 OFFICE O/P EST HI 40 MIN: CPT | Mod: ,,, | Performed by: INTERNAL MEDICINE

## 2019-03-06 RX ORDER — ATORVASTATIN CALCIUM 20 MG/1
80 TABLET, FILM COATED ORAL NIGHTLY
Status: DISCONTINUED | OUTPATIENT
Start: 2019-03-06 | End: 2019-03-07 | Stop reason: HOSPADM

## 2019-03-06 RX ORDER — CARVEDILOL 25 MG/1
25 TABLET ORAL 2 TIMES DAILY WITH MEALS
Status: DISCONTINUED | OUTPATIENT
Start: 2019-03-06 | End: 2019-03-07 | Stop reason: HOSPADM

## 2019-03-06 RX ORDER — PROMETHAZINE HYDROCHLORIDE 12.5 MG/1
12.5 TABLET ORAL EVERY 6 HOURS PRN
Status: DISCONTINUED | OUTPATIENT
Start: 2019-03-06 | End: 2019-03-07 | Stop reason: HOSPADM

## 2019-03-06 RX ORDER — SODIUM CHLORIDE 9 MG/ML
INJECTION, SOLUTION INTRAVENOUS ONCE
Status: COMPLETED | OUTPATIENT
Start: 2019-03-06 | End: 2019-03-07

## 2019-03-06 RX ORDER — SEVELAMER CARBONATE 800 MG/1
800 TABLET, FILM COATED ORAL
Status: DISCONTINUED | OUTPATIENT
Start: 2019-03-06 | End: 2019-03-07 | Stop reason: HOSPADM

## 2019-03-06 RX ORDER — POLYETHYLENE GLYCOL 3350 17 G/17G
17 POWDER, FOR SOLUTION ORAL 2 TIMES DAILY PRN
Status: DISCONTINUED | OUTPATIENT
Start: 2019-03-06 | End: 2019-03-07 | Stop reason: HOSPADM

## 2019-03-06 RX ORDER — IBUPROFEN 200 MG
24 TABLET ORAL
Status: DISCONTINUED | OUTPATIENT
Start: 2019-03-06 | End: 2019-03-07 | Stop reason: HOSPADM

## 2019-03-06 RX ORDER — GLUCAGON 1 MG
1 KIT INJECTION
Status: DISCONTINUED | OUTPATIENT
Start: 2019-03-06 | End: 2019-03-07 | Stop reason: HOSPADM

## 2019-03-06 RX ORDER — AMLODIPINE BESYLATE 10 MG/1
10 TABLET ORAL DAILY
Status: DISCONTINUED | OUTPATIENT
Start: 2019-03-07 | End: 2019-03-07 | Stop reason: HOSPADM

## 2019-03-06 RX ORDER — ONDANSETRON 4 MG/1
4 TABLET, ORALLY DISINTEGRATING ORAL
Status: DISCONTINUED | OUTPATIENT
Start: 2019-03-06 | End: 2019-03-06

## 2019-03-06 RX ORDER — PANTOPRAZOLE SODIUM 40 MG/10ML
40 INJECTION, POWDER, LYOPHILIZED, FOR SOLUTION INTRAVENOUS 2 TIMES DAILY
Status: DISCONTINUED | OUTPATIENT
Start: 2019-03-06 | End: 2019-03-07 | Stop reason: HOSPADM

## 2019-03-06 RX ORDER — ONDANSETRON 2 MG/ML
4 INJECTION INTRAMUSCULAR; INTRAVENOUS EVERY 8 HOURS PRN
Status: DISCONTINUED | OUTPATIENT
Start: 2019-03-06 | End: 2019-03-06

## 2019-03-06 RX ORDER — RAMELTEON 8 MG/1
8 TABLET ORAL NIGHTLY PRN
Status: DISCONTINUED | OUTPATIENT
Start: 2019-03-06 | End: 2019-03-07 | Stop reason: HOSPADM

## 2019-03-06 RX ORDER — IBUPROFEN 200 MG
16 TABLET ORAL
Status: DISCONTINUED | OUTPATIENT
Start: 2019-03-06 | End: 2019-03-07 | Stop reason: HOSPADM

## 2019-03-06 RX ORDER — ACETAMINOPHEN 325 MG/1
325 TABLET ORAL EVERY 8 HOURS PRN
Status: DISCONTINUED | OUTPATIENT
Start: 2019-03-06 | End: 2019-03-07 | Stop reason: HOSPADM

## 2019-03-06 RX ORDER — SODIUM CHLORIDE 9 MG/ML
INJECTION, SOLUTION INTRAVENOUS
Status: DISCONTINUED | OUTPATIENT
Start: 2019-03-06 | End: 2019-03-06

## 2019-03-06 RX ORDER — SODIUM CHLORIDE 0.9 % (FLUSH) 0.9 %
5 SYRINGE (ML) INJECTION
Status: DISCONTINUED | OUTPATIENT
Start: 2019-03-06 | End: 2019-03-07 | Stop reason: HOSPADM

## 2019-03-06 RX ORDER — ONDANSETRON 4 MG/1
4 TABLET, ORALLY DISINTEGRATING ORAL EVERY 8 HOURS PRN
Status: DISCONTINUED | OUTPATIENT
Start: 2019-03-06 | End: 2019-03-06

## 2019-03-06 RX ORDER — PANTOPRAZOLE SODIUM 40 MG/1
40 TABLET, DELAYED RELEASE ORAL
Status: DISCONTINUED | OUTPATIENT
Start: 2019-03-06 | End: 2019-03-06

## 2019-03-06 RX ADMIN — ATORVASTATIN CALCIUM 80 MG: 20 TABLET, FILM COATED ORAL at 09:03

## 2019-03-06 RX ADMIN — PANTOPRAZOLE SODIUM 40 MG: 40 INJECTION, POWDER, FOR SOLUTION INTRAVENOUS at 09:03

## 2019-03-06 RX ADMIN — ONDANSETRON 4 MG: 4 TABLET, ORALLY DISINTEGRATING ORAL at 02:03

## 2019-03-06 NOTE — ED NOTES
ISMAEL Encinas in dialysis notified that report has been called to patient room. Patient to go to room 1158A upon completion of dialysis tx.

## 2019-03-06 NOTE — TREATMENT PLAN
GI Treatment Plan    Addendum to consult note  - given patient's prior EGD with Grade D esophagitis and never presented for follow-up EGD will re-evaluate tomorrow with EGD.  - as stated in consult note, suspicion for active GI bleed remains low  - discussed with primary team

## 2019-03-06 NOTE — SUBJECTIVE & OBJECTIVE
Past Medical History:   Diagnosis Date    Amputation stump pain 9/10/2013    Aspiration pneumonia 7/27/2015    Asterixis 11/8/2016    C. difficile colitis 8/7/2015    Cholelithiasis without obstruction 8/25/2015    Chronic diastolic heart failure     2-23-17   1 - Low normal to mildly depressed left ventricular systolic function (EF 50-55%).    2 - Right ventricular enlargement with mildly depressed systolic function.    3 - Left ventricular diastolic dysfunction.    4 - Right atrial enlargement.    5 - Severe tricuspid regurgitation.    6 - Pulmonary hypertension. The estimated PA systolic pressure is 86 mmHg.    7 - Increased central venous pressure.    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    Metabolic acidosis, IAG, reduced excretion of inorganic acids     Myoclonic jerking 9/20/2016    Noncompliance with medication regimen 12/4/2018    Secondary hyperparathyroidism (of renal origin)     Secondary pulmonary hypertension 3/23/2017    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    TB lung, latent 08/25/2015    Negative Quantiferon Gold 3-23-17       Past Surgical History:   Procedure Laterality Date    AMPUTATION, BELOW KNEE Left 12/18/2013    Performed by Elgin Houston MD at [facility] OR 1ST FLR    COLONOSCOPY      COLONOSCOPY N/A 4/4/2017    Performed by Walker Stern MD at [facility] ENDO (2ND FLR)    EGD (ESOPHAGOGASTRODUODENOSCOPY) N/A 6/12/2018    Performed by  Man Galicia MD at [facility] ENDO (2ND FLR)    ESOPHAGOGASTRODUODENOSCOPY (EGD) N/A 5/22/2018    Performed by Ke Sparks MD at [facility] ENDO (2ND FLR)    ESOPHAGOGASTRODUODENOSCOPY (EGD) N/A 3/16/2018    Performed by Kevin De La Paz MD at [facility] ENDO (2ND FLR)    ESOPHAGOGASTRODUODENOSCOPY (EGD) N/A 3/8/2018    Performed by Man Galicia MD at [facility] ENDO (2ND FLR)    ESOPHAGOGASTRODUODENOSCOPY (EGD) N/A 4/4/2017    Performed by Walker Stern MD at [facility] ENDO (2ND FLR)    ESOPHAGOGASTRODUODENOSCOPY (EGD) N/A 10/17/2014    Performed by Man Galicia MD at [facility] ENDO (2ND FLR)    FISTULOGRAM Right 9/18/2014    Performed by Grayson Hubbard MD at [facility] CATH LAB    FOOT AMPUTATION THROUGH METATARSAL      left foot    LEG AMPUTATION THROUGH KNEE  12/18/2013    left BKA    R AVF  9/12/12    UPPER GASTROINTESTINAL ENDOSCOPY         Review of patient's allergies indicates:   Allergen Reactions    Fosrenol [lanthanum] Nausea And Vomiting     Nausea and vomiting     Family History     Problem Relation (Age of Onset)    Alcohol abuse Maternal Grandmother    Diabetes Brother, Maternal Grandfather    Early death Mother    Heart disease Father    Hyperlipidemia Father    Hypertension Father, Sister    Kidney disease Father        Tobacco Use    Smoking status: Former Smoker     Packs/day: 1.00     Years: 10.00     Pack years: 10.00    Smokeless tobacco: Never Used   Substance and Sexual Activity    Alcohol use: No    Drug use: No    Sexual activity: Yes     Partners: Female     Birth control/protection: None     Review of Systems   Constitutional: Positive for activity change and fatigue. Negative for appetite change, chills and fever.   HENT: Negative for sore throat and trouble swallowing.    Eyes: Negative for visual disturbance.   Respiratory: Negative for cough, chest tightness and shortness of breath.    Gastrointestinal: Positive for nausea and vomiting. Negative for abdominal distention, abdominal pain,  anal bleeding, blood in stool, constipation and diarrhea.   Genitourinary: Negative for dysuria.   Musculoskeletal: Negative for arthralgias and myalgias.   Skin: Negative for rash.   Neurological: Positive for weakness.   Psychiatric/Behavioral: Negative for agitation and confusion.     Objective:     Vital Signs (Most Recent):  Temp: 98.9 °F (37.2 °C) (03/06/19 0644)  Pulse: 80 (03/06/19 1147)  Resp: 16 (03/06/19 0644)  BP: 116/70 (03/06/19 1147)  SpO2: 100 % (03/06/19 1147) Vital Signs (24h Range):  Temp:  [98.9 °F (37.2 °C)] 98.9 °F (37.2 °C)  Pulse:  [78-83] 80  Resp:  [16] 16  SpO2:  [97 %-100 %] 100 %  BP: (104-140)/(64-78) 116/70     Weight: 63.9 kg (140 lb 14 oz) (03/06/19 0644)  Body mass index is 22.74 kg/m².    No intake or output data in the 24 hours ending 03/06/19 1219    Lines/Drains/Airways     Drain                 Hemodialysis AV Fistula Right upper arm -- days          Peripheral Intravenous Line                 Peripheral IV - Single Lumen 03/06/19 0704 Left Antecubital less than 1 day                Physical Exam   Constitutional: He is oriented to person, place, and time. No distress.   Weak, frail appearing middle aged man. Replying primarily in short answers. No distress.   HENT:   Head: Normocephalic and atraumatic.   Mouth/Throat: Oropharynx is clear and moist. No oropharyngeal exudate.   Eyes: Conjunctivae are normal. No scleral icterus.   Neck: No JVD present.   Cardiovascular: Normal rate, regular rhythm, normal heart sounds and intact distal pulses.   Pulmonary/Chest: Effort normal and breath sounds normal. No respiratory distress.   Abdominal: Soft. Bowel sounds are normal. He exhibits no distension and no mass. There is no tenderness. There is no rebound and no guarding.   ALAN with greenish-brown stool, no melena or hematochezia.   Genitourinary:   Genitourinary Comments: Fistula to RUE   Musculoskeletal: He exhibits no edema or tenderness.   Lymphadenopathy:     He has no cervical  adenopathy.   Neurological: He is alert and oriented to person, place, and time.   Skin: Skin is warm. Capillary refill takes less than 2 seconds. He is not diaphoretic.   Psychiatric: He has a normal mood and affect. His behavior is normal. Judgment and thought content normal.   Nursing note and vitals reviewed.      Significant Labs:  CBC:   Recent Labs   Lab 03/06/19  0709 03/06/19  0710   WBC 9.73  --    HGB 10.1*  --    HCT 29.0* 29*     --      CMP:   Recent Labs   Lab 03/06/19  0709   *   CALCIUM 10.9*   ALBUMIN 3.3*   PROT 8.6*   *   K 4.6   CO2 29   CL 80*   *   CREATININE 11.2*   ALKPHOS 143*   ALT 17   AST 23   BILITOT 0.5     Coagulation:   Recent Labs   Lab 03/06/19  0709   INR 1.0       Significant Imaging:  Imaging results within the past 24 hours have been reviewed.

## 2019-03-06 NOTE — PROGRESS NOTES
3 hr dialysis trx started via R UA AVF. Zofran given c/o nausea upon arrival to [place]. Pt states [place] [facility]

## 2019-03-06 NOTE — ED TRIAGE NOTES
"Pt reports that he was at dialysis and told them he was "not feeling well."     Patient identifiers verified and correct for Vaughn Retana.   LOC: The patient is awake, alert and aware of environment with an appropriate affect, the patient is oriented x 3, but speech is sluggish. Pt reports fatigue.  APPEARANCE: Patient appears comfortable and in no acute distress, patient is clean and well groomed.  SKIN: The skin is warm and dry, color consistent with ethnicity, patient has normal skin turgor and moist mucus membranes, skin intact, no breakdown or bruising noted.   MUSCULOSKELETAL: Patient moving all extremities spontaneously, no swelling noted. Pt has a left AKA.  RESPIRATORY: Airway is open and patent, respirations are spontaneous, patient has a normal effort and rate, no accessory muscle use noted, pt placed on continuous pulse ox with O2 sats noted at 97% on room air.  CARDIAC: Pt placed on cardiac monitor. Patient has a normal rate and regular rhythm, no edema noted, capillary refill < 3 seconds.   GASTRO: Soft and non tender to palpation, no distention noted, normoactive bowel sounds present in all four quadrants. Pt states bowel movements have been regular. Pt reports lack of appetite and nausea, pt is spitting, no mucous of blood in spit.  : Pt denies any pain or frequency with urination. Pt goes to dialysis.  NEURO: Pt opens eyes spontaneously, behavior appropriate to situation, follows commands, facial expression symmetrical, bilateral hand grasp equal and even, purposeful motor response noted, normal sensation in all extremities when touched with a finger.    "

## 2019-03-06 NOTE — ASSESSMENT & PLAN NOTE
Mr Retana is a 53 year old man with history of ESRD on HD, HTN, CVA, 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    Labs notable for slight decline from baseline with H&H 10.1 which could be due to underlying anemia of CKD. He does not provide a history consistent with GI bleed and his ALAN is negative (greenish-brown stools). Elevated BUN is difficult to interpret given concurrent rise in Crt and known ESRD.    Plan  - conservative management at this time as no overt evidence of gi bleeding  - protonix 40mg IV BID  - trend H&H  - no anticoagulants or antiplatelets (not on any at baseline)  - no NSAIDs (denies as outpatient)  - maintain 2 large bore peripheral IV  - will continue to monitor and consider endoscopic evaluation if evidence of GI bleeding

## 2019-03-06 NOTE — CONSULTS
[facility]  Gastroenterology  Consult Note    Patient Name: Vaughn Retana  MRN: [id number]  Admission Date: 3/6/2019  Hospital Length of Stay: 0 days  Code Status: Full Code   Attending Provider: Marielena Velasquez MD   Consulting Provider: Yimi Jacques MD  Primary Care Physician: Yvette Zelaya NP  Principal Problem:<principal problem not specified>    Inpatient consult to Gastroenterology  Consult performed by: Yimi Jacques MD  Consult ordered by: Raymundo Taylor MD        Subjective:     HPI:  Mr Retana is a 53 year old man with history of ESRD on HD, HTN, CVA, 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    History is limited from patient. He reports that he was feeling well until ~1 week ago when he began to feel unwell. He is unable to describe any focality of his unwell feeling. On further history he endorses ~1 week of nausea and vomiting, vomiting is non-bloody, bilious. Denies any abdominal pain, diarrhea, hematochezia or melena. He denies any NSAIDs or smoking or drinking.     He was previously evaluated in the hospital (8/2018) due to concern for hematemesis but history and examination was not consistent with UGIB, he was monitored and did not undergo endoscopic evaluation. He was seen in clinic follow-up (9/12/18) and was doing well. He was continued on protonix BID and recommended for EGD to re-evaluate esophagitis (not yet done).    On admission, labs notable for H&H 10.1 (baseline 11-12), BUN/Crt 102/11.2 (previously 33/6.5 12/11/18). He is due for HD today.    Prior Endo hx  EGD (5/22) Dr. Sparks. Indication: hematemesis  - Grade D reflux esophagitis     EGD (3/16/18) Dr. De La Paz. Indication: coffee-grind emesis  - gastritis. H. Pylori negative.  - 6mm gastric polyp. Gastric xanthoma, negative dysplasia or malignancy  - 2mm gastric ulcer, clean based  - Grade  C esophagitis     EGD (3/8/18) Dr. Galicia. Indication: UGIB  - Grade B reflux esophagitis  - gastric polyp          Past Medical History:   Diagnosis Date    Amputation stump pain 9/10/2013    Aspiration pneumonia 7/27/2015    Asterixis 11/8/2016    C. difficile colitis 8/7/2015    Cholelithiasis without obstruction 8/25/2015    Chronic diastolic heart failure     2-23-17   1 - Low normal to mildly depressed left ventricular systolic function (EF 50-55%).    2 - Right ventricular enlargement with mildly depressed systolic function.    3 - Left ventricular diastolic dysfunction.    4 - Right atrial enlargement.    5 - Severe tricuspid regurgitation.    6 - Pulmonary hypertension. The estimated PA systolic pressure is 86 mmHg.    7 - Increased central venous pressure.    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    Metabolic acidosis, IAG, reduced excretion of inorganic acids     Myoclonic jerking 9/20/2016    Noncompliance with medication regimen 12/4/2018    Secondary hyperparathyroidism (of renal origin)     Secondary pulmonary hypertension 3/23/2017    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    TB lung, latent 08/25/2015    Negative Quantiferon Gold 3-23-17       Past Surgical History:   Procedure Laterality Date    AMPUTATION, BELOW KNEE Left 12/18/2013    Performed [facility]gin [facility]ST FLR    COLONOSCOPY      COLONOSCOPY N/A 4/4/2017     Performed by Walker Stern [facility] at [facility] ENDO (2ND FLR)    EGD (ESOPHAGOGASTRODUODENOSCOPY) N/A 6/12/2018    Performed by Man Galicia MD at [facility] ENDO (2ND FLR)    ESOPHAGOGASTRODUODENOSCOPY (EGD) N/A 5/22/2018    Performed by Ke Sparks MD at [facility] ENDO (2ND FLR)    ESOPHAGOGASTRODUODENOSCOPY (EGD) N/A 3/16/2018    Performed by Kevin De La Paz MD at [facility] ENDO (2ND FLR)    ESOPHAGOGASTRODUODENOSCOPY (EGD) N/A 3/8/2018    Performed by Man Galicia MD at [facility] ENDO (2ND FLR)    ESOPHAGOGASTRODUODENOSCOPY (EGD) N/A 4/4/2017    Performed by Walker Stern MD at [facility] ENDO (2ND FLR)    ESOPHAGOGASTRODUODENOSCOPY (EGD) N/A 10/17/2014    Performed [facility] at [facility] ([facility])    FISTULOGRAM Right 9/18/2014    Performed by Grayson Hubbard MD at [facility] CATH LAB    FOOT AMPUTATION THROUGH METATARSAL      left foot    LEG AMPUTATION THROUGH KNEE  12/18/2013    left BKA    R AVF  9/12/12    UPPER GASTROINTESTINAL ENDOSCOPY         Review of patient's allergies indicates:   Allergen Reactions    Fosrenol [lanthanum] Nausea And Vomiting     Nausea and vomiting     Family History     Problem Relation (Age of Onset)    Alcohol abuse Maternal Grandmother    Diabetes Brother, Maternal Grandfather    Early death Mother    Heart disease Father    Hyperlipidemia Father    Hypertension Father, Sister    Kidney disease Father        Tobacco Use    Smoking status: Former Smoker     Packs/day: 1.00     Years: 10.00     Pack years: 10.00    Smokeless tobacco: Never Used   Substance and Sexual Activity    Alcohol use: No    Drug use: No    Sexual activity: Yes     Partners: Female     Birth control/protection: None     Review of Systems   Constitutional: Positive for activity change and fatigue. Negative for appetite change, chills and fever.   HENT: Negative for sore throat and trouble swallowing.    Eyes: Negative for visual disturbance.   Respiratory: Negative for cough, chest tightness and shortness of  breath.    Gastrointestinal: Positive for nausea and vomiting. Negative for abdominal distention, abdominal pain, anal bleeding, blood in stool, constipation and diarrhea.   Genitourinary: Negative for dysuria.   Musculoskeletal: Negative for arthralgias and myalgias.   Skin: Negative for rash.   Neurological: Positive for weakness.   Psychiatric/Behavioral: Negative for agitation and confusion.     Objective:     Vital Signs (Most Recent):  Temp: 98.9 °F (37.2 °C) (03/06/19 0644)  Pulse: 80 (03/06/19 1147)  Resp: 16 (03/06/19 0644)  BP: 116/70 (03/06/19 1147)  SpO2: 100 % (03/06/19 1147) Vital Signs (24h Range):  Temp:  [98.9 °F (37.2 °C)] 98.9 °F (37.2 °C)  Pulse:  [78-83] 80  Resp:  [16] 16  SpO2:  [97 %-100 %] 100 %  BP: (104-140)/(64-78) 116/70     Weight: 63.9 kg (140 lb 14 oz) (03/06/19 0644)  Body mass index is 22.74 kg/m².    No intake or output data in the 24 hours ending 03/06/19 1219    Lines/Drains/Airways     Drain                 Hemodialysis AV Fistula Right upper arm -- days          Peripheral Intravenous Line                 Peripheral IV - Single Lumen 03/06/19 0704 Left Antecubital less than 1 day                Physical Exam   Constitutional: He is oriented to person, place, and time. No distress.   Weak, frail appearing middle aged man. Replying primarily in short answers. No distress.   HENT:   Head: Normocephalic and atraumatic.   Mouth/Throat: Oropharynx is clear and moist. No oropharyngeal exudate.   Eyes: Conjunctivae are normal. No scleral icterus.   Neck: No JVD present.   Cardiovascular: Normal rate, regular rhythm, normal heart sounds and intact distal pulses.   Pulmonary/Chest: Effort normal and breath sounds normal. No respiratory distress.   Abdominal: Soft. Bowel sounds are normal. He exhibits no distension and no mass. There is no tenderness. There is no rebound and no guarding.   ALAN with greenish-brown stool, no melena or hematochezia.   Genitourinary:   Genitourinary Comments:  Fistula to RUE   Musculoskeletal: He exhibits no edema or tenderness.   Lymphadenopathy:     He has no cervical adenopathy.   Neurological: He is alert and oriented to person, place, and time.   Skin: Skin is warm. Capillary refill takes less than 2 seconds. He is not diaphoretic.   Psychiatric: He has a normal mood and affect. His behavior is normal. Judgment and thought content normal.   Nursing note and vitals reviewed.      Significant Labs:  CBC:   Recent Labs   Lab 03/06/19  0709 03/06/19  0710   WBC 9.73  --    HGB 10.1*  --    HCT 29.0* 29*     --      CMP:   Recent Labs   Lab 03/06/19  0709   *   CALCIUM 10.9*   ALBUMIN 3.3*   PROT 8.6*   *   K 4.6   CO2 29   CL 80*   *   CREATININE 11.2*   ALKPHOS 143*   ALT 17   AST 23   BILITOT 0.5     Coagulation:   Recent Labs   Lab 03/06/19  0709   INR 1.0       Significant Imaging:  Imaging results within the past 24 hours have been reviewed.    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    Labs notable for slight decline from baseline with H&H 10.1 which could be due to underlying anemia of CKD. He does not provide a history consistent with GI bleed and his ALAN is negative (greenish-brown stools). Elevated BUN is difficult to interpret given concurrent rise in Crt and known ESRD.    Plan  - conservative management at this time as no overt evidence of gi bleeding  - protonix 40mg IV BID  - trend H&H  - no anticoagulants or antiplatelets (not on any at baseline)  - no NSAIDs (denies as outpatient)  - maintain 2 large bore peripheral IV  - will continue to monitor and consider endoscopic evaluation if evidence of GI bleeding           Thank you for your consult. I will follow-up with patient. Please contact us if you  have any additional questions.    Yimi Jacques MD  Gastroenterology  [facility]

## 2019-03-06 NOTE — H&P (VIEW-ONLY)
[facility]  Gastroenterology  Consult Note    Patient Name: Vaughn Retana  MRN: [id number]  Admission Date: 3/6/2019  Hospital Length of Stay: 0 days  Code Status: Full Code   Attending Provider: Marielena Velasquez MD   Consulting Provider: Yimi Jacques MD  Primary Care Physician: Yvette Zelaya NP  Principal Problem:<principal problem not specified>    Inpatient consult to Gastroenterology  Consult performed by: Yimi Jacques MD  Consult ordered by: Raymundo Taylor MD        Subjective:     HPI:  Mr Retana is a 53 year old man with history of ESRD on HD, HTN, CVA, 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    History is limited from patient. He reports that he was feeling well until ~1 week ago when he began to feel unwell. He is unable to describe any focality of his unwell feeling. On further history he endorses ~1 week of nausea and vomiting, vomiting is non-bloody, bilious. Denies any abdominal pain, diarrhea, hematochezia or melena. He denies any NSAIDs or smoking or drinking.     He was previously evaluated in the hospital (8/2018) due to concern for hematemesis but history and examination was not consistent with UGIB, he was monitored and did not undergo endoscopic evaluation. He was seen in clinic follow-up (9/12/18) and was doing well. He was continued on protonix BID and recommended for EGD to re-evaluate esophagitis (not yet done).    On admission, labs notable for H&H 10.1 (baseline 11-12), BUN/Crt 102/11.2 (previously 33/6.5 12/11/18). He is due for HD today.    Prior Endo hx  EGD (5/22) Dr. Sparks. Indication: hematemesis  - Grade D reflux esophagitis     EGD (3/16/18) Dr. De La Paz. Indication: coffee-grind emesis  - gastritis. H. Pylori negative.  - 6mm gastric polyp. Gastric xanthoma, negative dysplasia or malignancy  - 2mm gastric ulcer, clean based  - Grade  C esophagitis     EGD (3/8/18) Dr. Galicia. Indication: UGIB  - Grade B reflux esophagitis  - gastric polyp          Past Medical History:   Diagnosis Date    Amputation stump pain 9/10/2013    Aspiration pneumonia 7/27/2015    Asterixis 11/8/2016    C. difficile colitis 8/7/2015    Cholelithiasis without obstruction 8/25/2015    Chronic diastolic heart failure     2-23-17   1 - Low normal to mildly depressed left ventricular systolic function (EF 50-55%).    2 - Right ventricular enlargement with mildly depressed systolic function.    3 - Left ventricular diastolic dysfunction.    4 - Right atrial enlargement.    5 - Severe tricuspid regurgitation.    6 - Pulmonary hypertension. The estimated PA systolic pressure is 86 mmHg.    7 - Increased central venous pressure.    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 OR 1ST FLR    COLONOSCOPY      COLONOSCOPY N/A 4/4/2017     Performed by Walker Stern MD at [facility] ENDO (2ND FLR)    EGD (ESOPHAGOGASTRODUODENOSCOPY) N/A 6/12/2018    Performed by Man Galicia MD at [facility] ENDO (2ND FLR)    ESOPHAGOGASTRODUODENOSCOPY (EGD) N/A 5/22/2018    Performed by Ke Sparks MD at [facility] ENDO (2ND FLR)    ESOPHAGOGASTRODUODENOSCOPY (EGD) N/A 3/16/2018    Performed by Kevin De La Paz MD at [facility] ENDO (2ND FLR)    ESOPHAGOGASTRODUODENOSCOPY (EGD) N/A 3/8/2018    Performed by Man Galicia MD at [facility] ENDO (2ND FLR)    ESOPHAGOGASTRODUODENOSCOPY (EGD) N/A 4/4/2017    Performed by Walker Stern MD at [facility] ENDO (2ND FLR)    ESOPHAGOGASTRODUODENOSCOPY (EGD) N/A 10/17/2014    Performed by [facility] at [facility] (2ND FLR)    FISTULOGRAM Right 9/18/2014    Performed by Grayson Hubbard MD at [facility] CATH LAB    FOOT AMPUTATION THROUGH METATARSAL      left foot    LEG AMPUTATION THROUGH KNEE  12/18/2013    left BKA    R AVF  9/12/12    UPPER GASTROINTESTINAL ENDOSCOPY         Review of patient's allergies indicates:   Allergen Reactions    Fosrenol [lanthanum] Nausea And Vomiting     Nausea and vomiting     Family History     Problem Relation (Age of Onset)    Alcohol abuse Maternal Grandmother    Diabetes Brother, Maternal Grandfather    Early death Mother    Heart disease Father    Hyperlipidemia Father    Hypertension Father, Sister    Kidney disease Father        Tobacco Use    Smoking status: Former Smoker     Packs/day: 1.00     Years: 10.00     Pack years: 10.00    Smokeless tobacco: Never Used   Substance and Sexual Activity    Alcohol use: No    Drug use: No    Sexual activity: Yes     Partners: Female     Birth control/protection: None     Review of Systems   Constitutional: Positive for activity change and fatigue. Negative for appetite change, chills and fever.   HENT: Negative for sore throat and trouble swallowing.    Eyes: Negative for visual disturbance.   Respiratory: Negative for cough, chest tightness and shortness of  breath.    Gastrointestinal: Positive for nausea and vomiting. Negative for abdominal distention, abdominal pain, anal bleeding, blood in stool, constipation and diarrhea.   Genitourinary: Negative for dysuria.   Musculoskeletal: Negative for arthralgias and myalgias.   Skin: Negative for rash.   Neurological: Positive for weakness.   Psychiatric/Behavioral: Negative for agitation and confusion.     Objective:     Vital Signs (Most Recent):  Temp: 98.9 °F (37.2 °C) (03/06/19 0644)  Pulse: 80 (03/06/19 1147)  Resp: 16 (03/06/19 0644)  BP: 116/70 (03/06/19 1147)  SpO2: 100 % (03/06/19 1147) Vital Signs (24h Range):  Temp:  [98.9 °F (37.2 °C)] 98.9 °F (37.2 °C)  Pulse:  [78-83] 80  Resp:  [16] 16  SpO2:  [97 %-100 %] 100 %  BP: (104-140)/(64-78) 116/70     Weight: 63.9 kg (140 lb 14 oz) (03/06/19 0644)  Body mass index is 22.74 kg/m².    No intake or output data in the 24 hours ending 03/06/19 1219    Lines/Drains/Airways     Drain                 Hemodialysis AV Fistula Right upper arm -- days          Peripheral Intravenous Line                 Peripheral IV - Single Lumen 03/06/19 0704 Left Antecubital less than 1 day                Physical Exam   Constitutional: He is oriented to person, place, and time. No distress.   Weak, frail appearing middle aged man. Replying primarily in short answers. No distress.   HENT:   Head: Normocephalic and atraumatic.   Mouth/Throat: Oropharynx is clear and moist. No oropharyngeal exudate.   Eyes: Conjunctivae are normal. No scleral icterus.   Neck: No JVD present.   Cardiovascular: Normal rate, regular rhythm, normal heart sounds and intact distal pulses.   Pulmonary/Chest: Effort normal and breath sounds normal. No respiratory distress.   Abdominal: Soft. Bowel sounds are normal. He exhibits no distension and no mass. There is no tenderness. There is no rebound and no guarding.   ALAN with greenish-brown stool, no melena or hematochezia.   Genitourinary:   Genitourinary Comments:  Fistula to RUE   Musculoskeletal: He exhibits no edema or tenderness.   Lymphadenopathy:     He has no cervical adenopathy.   Neurological: He is alert and oriented to person, place, and time.   Skin: Skin is warm. Capillary refill takes less than 2 seconds. He is not diaphoretic.   Psychiatric: He has a normal mood and affect. His behavior is normal. Judgment and thought content normal.   Nursing note and vitals reviewed.      Significant Labs:  CBC:   Recent Labs   Lab 03/06/19  0709 03/06/19  0710   WBC 9.73  --    HGB 10.1*  --    HCT 29.0* 29*     --      CMP:   Recent Labs   Lab 03/06/19  0709   *   CALCIUM 10.9*   ALBUMIN 3.3*   PROT 8.6*   *   K 4.6   CO2 29   CL 80*   *   CREATININE 11.2*   ALKPHOS 143*   ALT 17   AST 23   BILITOT 0.5     Coagulation:   Recent Labs   Lab 03/06/19  0709   INR 1.0       Significant Imaging:  Imaging results within the past 24 hours have been reviewed.    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    Labs notable for slight decline from baseline with H&H 10.1 which could be due to underlying anemia of CKD. He does not provide a history consistent with GI bleed and his ALAN is negative (greenish-brown stools). Elevated BUN is difficult to interpret given concurrent rise in Crt and known ESRD.    Plan  - conservative management at this time as no overt evidence of gi bleeding  - protonix 40mg IV BID  - trend H&H  - no anticoagulants or antiplatelets (not on any at baseline)  - no NSAIDs (denies as outpatient)  - maintain 2 large bore peripheral IV  - will continue to monitor and consider endoscopic evaluation if evidence of GI bleeding           Thank you for your consult. I will follow-up with patient. Please contact us if you  have any additional questions.    Yimi Jacques MD  Gastroenterology  [facility]

## 2019-03-06 NOTE — ED PROVIDER NOTES
"Encounter Date: 3/6/2019       History     Chief Complaint   Patient presents with    Multiple Complaints     pt arrived by EMS from the dialysis building. when asked what symptoms does the pt have, pt states "I don't know I just don't feel good."     Mr. Retana 53 y.o male with history of GI bleed, uncontrolled HTN , CVA, chronic diastolic HF, ESRD (MWF) presented to ED c/o generalized " I am not feeling well". He went to his Wednesday dialysis and dialysis sent him to ED. Patient has not missed any his dialysis session. Patient reported that he hasn't been taking in much PO due to his nausea and vomiting that started about two weeks ago. He said he usually has a lot of secretion but he has been having more secretion than usual. He does not report blood with his emesis. He does feel fatigued but denied fever, chill, CP, SOB, abdominal pain, HA, dizziness, AMS, cough and any burning sensation during urination.            Review of patient's allergies indicates:   Allergen Reactions    Fosrenol [lanthanum] Nausea And Vomiting     Nausea and vomiting     Past Medical History:   Diagnosis Date    Amputation stump pain 9/10/2013    Aspiration pneumonia 7/27/2015    Asterixis 11/8/2016    C. difficile colitis 8/7/2015    Cholelithiasis without obstruction 8/25/2015    Chronic diastolic heart failure     2-23-17   1 - Low normal to mildly depressed left ventricular systolic function (EF 50-55%).    2 - Right ventricular enlargement with mildly depressed systolic function.    3 - Left ventricular diastolic dysfunction.    4 - Right atrial enlargement.    5 - Severe tricuspid regurgitation.    6 - Pulmonary hypertension. The estimated PA systolic pressure is 86 mmHg.    7 - Increased central venous pressure.    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 OR 1ST FLR    COLONOSCOPY      COLONOSCOPY N/A 4/4/2017    Performed by Walker Stern MD at [facility] ENDO (2ND FLR)    EGD (ESOPHAGOGASTRODUODENOSCOPY) N/A 6/12/2018    Performed by Man Galicia MD at [facility] ENDO (2ND FLR)    ESOPHAGOGASTRODUODENOSCOPY (EGD) N/A 5/22/2018    Performed by Ke Sparks MD at [facility] ENDO (2ND FLR)    ESOPHAGOGASTRODUODENOSCOPY (EGD) N/A 3/16/2018    Performed by Kevin De La Paz MD at [facility] ENDO (2ND FLR)    ESOPHAGOGASTRODUODENOSCOPY (EGD) N/A 3/8/2018    Performed by Man Galicia MD at [facility] ENDO (2ND FLR)    ESOPHAGOGASTRODUODENOSCOPY (EGD) N/A 4/4/2017    Performed by Walker Stern MD at [facility] ENDO (2ND FLR)    ESOPHAGOGASTRODUODENOSCOPY (EGD) N/A 10/17/2014    Performed by Man Galicia MD at [facility] ENDO (2ND FLR)    FISTULOGRAM Right 9/18/2014    Performed by Grayson Hubbard MD at [facility] CATH LAB    FOOT AMPUTATION THROUGH METATARSAL      left foot    LEG AMPUTATION THROUGH KNEE  12/18/2013    left BKA    R AVF  9/12/12    UPPER GASTROINTESTINAL ENDOSCOPY       Family History   Problem Relation  Age of Onset    Early death Mother     Kidney disease Father     Hypertension Father     Heart disease Father     Hyperlipidemia Father     Diabetes Brother     Alcohol abuse Maternal Grandmother     Diabetes Maternal Grandfather     Hypertension Sister     Melanoma Neg Hx      Social History     Tobacco Use    Smoking status: Former Smoker     Packs/day: 1.00     Years: 10.00     Pack years: 10.00    Smokeless tobacco: Never Used   Substance Use Topics    Alcohol use: No    Drug use: No     Review of Systems   Constitutional: Positive for fatigue. Negative for chills, diaphoresis and fever.   HENT: Negative for congestion, sinus pressure and sore throat.    Respiratory: Negative for cough and shortness of breath.    Cardiovascular: Negative for chest pain, palpitations and leg swelling.   Gastrointestinal: Positive for nausea and vomiting. Negative for abdominal distention, abdominal pain, constipation and diarrhea.   Genitourinary: Negative for difficulty urinating, dysuria and flank pain.   Musculoskeletal: Negative for arthralgias and back pain.   Psychiatric/Behavioral: Positive for agitation and decreased concentration. Negative for confusion.       Physical Exam     Initial Vitals [03/06/19 0644]   BP Pulse Resp Temp SpO2   104/64 81 16 98.9 °F (37.2 °C) 97 %      MAP       --         Physical Exam    Constitutional: He appears distressed.   Patient in distress due to nausea   HENT:   Head: Normocephalic and atraumatic.   Eyes: Conjunctivae and EOM are normal. Pupils are equal, round, and reactive to light.   Neck: Normal range of motion. Neck supple.   Cardiovascular: Normal rate, regular rhythm, normal heart sounds and intact distal pulses.   No murmur heard.  Pulmonary/Chest: Breath sounds normal. No respiratory distress.   Abdominal: Soft. Bowel sounds are normal. He exhibits no distension. There is tenderness (RUQ on palpation ). There is no rebound and no guarding.   Genitourinary: Rectal  exam shows guaiac positive stool. Guaiac positive stool. : Acceptable.  Genitourinary Comments: Rectal exam showed no visible blood in anus or external skin tag visible. Normal size prostate palpable and no stool was palpated. On glove black streak seen.    Musculoskeletal: Normal range of motion. He exhibits no edema or tenderness.   R above the knee amputation and prosthesis present    Neurological: He is alert and oriented to person, place, and time. GCS eye subscore is 4. GCS verbal subscore is 5. GCS motor subscore is 6.   Skin: Skin is warm and dry. Capillary refill takes less than 2 seconds.         ED Course   Procedures  Labs Reviewed   CBC W/ AUTO DIFFERENTIAL - Abnormal; Notable for the following components:       Result Value    RBC 3.20 (*)     Hemoglobin 10.1 (*)     Hematocrit 29.0 (*)     MCH 31.6 (*)     RDW 15.1 (*)     MPV 13.1 (*)     Mono # 1.3 (*)     All other components within normal limits   POCT GLUCOSE - Abnormal; Notable for the following components:    POCT Glucose 219 (*)     All other components within normal limits   ISTAT PROCEDURE - Abnormal; Notable for the following components:    POC Glucose 187 (*)     POC BUN 96 (*)     POC Creatinine 11.1 (*)     POC Sodium 129 (*)     POC Chloride 82 (*)     POC TCO2 (MEASURED) 38 (*)     POC Hematocrit 29 (*)     All other components within normal limits   PROTIME-INR   TROPONIN I   B-TYPE NATRIURETIC PEPTIDE   COMPREHENSIVE METABOLIC PANEL   URINALYSIS, REFLEX TO URINE CULTURE   LACTIC ACID, PLASMA   TSH   B-TYPE NATRIURETIC PEPTIDE   POCT GLUCOSE MONITORING CONTINUOUS   ISTAT CHEM8     EKG Readings: (Independently Interpreted)   Sinus rhythm, rate 84, non-specific t-wave changes, no e/o acute ischemia       Imaging Results          CT Head Without Contrast (In process)                X-Ray Abdomen AP 1 View (KUB) (In process)                X-Ray Chest AP Portable (In process)               X-Rays:   Independently  Interpreted Readings:   Chest X-Ray: Normal heart size.  No infiltrates.   Abdomen: Nonspecific bowel gas.  No free air under diaphragm.  No air fluid levels or signs of obstruction.     Medical Decision Making:   Initial Assessment:   Mr Retana 53y.o presented with nausea and generally not feeling well. He is unable to express what is wrong. Patient Vital stable but iSTAT showing Na abnormality. Patient protecting his airway even during emesis.   Differential Diagnosis:   My ddx are but not limited to electrolyte imbalance vs gastroenteritis vs gastroparesis vs pseudo obstruction.     Independently Interpreted Test(s):   I have ordered and independently interpreted X-rays - see prior notes.  I have ordered and independently interpreted EKG Reading(s) - see prior notes  Clinical Tests:   Lab Tests: Ordered and Reviewed  Radiological Study: Ordered and Reviewed  Medical Tests: Ordered and Reviewed  ED Management:  7:45 AM  ISTAT showed hyponatremia, glucose level normal, K level wnl. EKG showed prolonged QTc at 496 which is the reason we are cautious with anti emetics.  8:31 AM  CBC wnl,  elevated troponin at 0.16 but patient has history of ESRD, lactate and BNP. PT/INR wnl.   10:57 AM  CMP showed hypochloremic most likely 2/2 vomiting and also increase anion gap. BUN is elevated. Consulted ESRD service and they would see him today.  Concern for GI bleed with history of GI bleed. ALAN showed dark streak of stool butHgb wnl.   Other:   I have discussed this case with another health care provider.       <> Summary of the Discussion: Hospital medicine and nephrology              Attending Attestation:   Physician Attestation Statement for Resident:  As the supervising MD   Physician Attestation Statement: I have personally seen and examined this patient.   I agree with the above history. -: 53 M ESRD on HD last HD day before yesterday presents with c/o 'not feeling good' 2 weeks of persistent nausea. And increasing  episodes emesis. Not able to keep PO meds down. No CP or SOB. No abd pain.   As the supervising MD I agree with the above PE.    As the supervising MD I agree with the above treatment, course, plan, and disposition.  I have reviewed and agree with the residents interpretation of the following: lab data, x-rays and EKG.                       Clinical Impression:       ICD-10-CM ICD-9-CM   1. ESRD on hemodialysis N18.6 585.6    Z99.2 V45.11   2. Weakness R53.1 780.79   3. Nausea & vomiting R11.2 787.01   4. Gastrointestinal hemorrhage, unspecified gastrointestinal hemorrhage type K92.2 578.9         Disposition:   Disposition: Placed in Observation  Condition: Stable                        Jeane Arcos MD  03/06/19 0698

## 2019-03-06 NOTE — CONSULTS
[facility]  Nephrology  Consult Note    Patient Name: Vaughn Retana  MRN: [id number]  Admission Date: 3/6/2019  Hospital Length of Stay: 0 days  Attending Provider: Marielena Velasquez MD   Primary Care Physician: Yvette Zelaya NP  Principal Problem:Intractable vomiting with nausea    Inpatient consult to Nephrology  Consult performed by: Bari Delong MD  Consult ordered by: Raymundo Taylor MD        Subjective:     HPI:     52 y/o man with ESRD and other medical problems presented to ER due to not feeling well. Pt has ESRD, has been on chronic hemodialysis, has prior CVA, GI bleed due to esophagitis, GERD, hypertension. He reported feeling weak and tired for a weak. He is not an accurate historian.    I looked up his outpatient dialysis clinic records from [facility]. Per those records, his last HD was on 3/1/19. His dry weight is 66 kg. His treatment time is 3 hours 45 minutes. His last weekly Hb from there was 11.3 from 2/27/19. His BUN at baseline has been in the range of 50's to 80's.    He admitted to having some nausea and vomiting. His BUN was 102 and creatinine 11.2 from ER. He was seen in the ER. He could not provide more information than as described above. Due to concern for disproportionate rise in BUN, he had GI evaluation in the ER, GI felt low suspicion for GI bleed but later decided to keep him for EGD tomorrow.     Past Medical History:   Diagnosis Date    Amputation stump pain 9/10/2013    Aspiration pneumonia 7/27/2015    Asterixis 11/8/2016    C. difficile colitis 8/7/2015    Cholelithiasis without obstruction 8/25/2015    Chronic diastolic heart failure     2-23-17   1 - Low normal to mildly depressed left ventricular systolic function (EF 50-55%).    2 - Right ventricular enlargement with mildly depressed systolic function.    3 - Left ventricular diastolic dysfunction.    4 - Right atrial enlargement.    5 - Severe tricuspid regurgitation.    6 -  Pulmonary hypertension. The estimated PA systolic pressure is 86 mmHg.    7 - Increased central venous pressure.    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    Metabolic acidosis, IAG, reduced excretion of inorganic acids     Myoclonic jerking 9/20/2016    Noncompliance with medication regimen 12/4/2018    Secondary hyperparathyroidism (of renal origin)     Secondary pulmonary hypertension 3/23/2017    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    TB lung, latent 08/25/2015    Negative Quantiferon Gold 3-23-17       Past Surgical History:   Procedure Laterality Date    AMPUTATION, BELOW KNEE Left 12/18/2013    Performed by Elgin Houston MD at [facility] OR 1ST FLR    COLONOSCOPY      COLONOSCOPY N/A 4/4/2017    Performed by [facility] (2ND FLR)    EGD (ESOPHAGOGASTRODUODENOSCOPY) N/A 6/12/2018    Performed by Man Galicia MD at [facility] ENDO (2ND FLR)    ESOPHAGOGASTRODUODENOSCOPY (EGD) N/A 5/22/2018    Performed by Ke Sparks MD at [facility] ENDO (2ND FLR)    ESOPHAGOGASTRODUODENOSCOPY (EGD) N/A 3/16/2018    Performed by Kevin De La Paz MD at [facility] ENDO (2ND FLR)    ESOPHAGOGASTRODUODENOSCOPY (EGD) N/A 3/8/2018    Performed by Man Galicia MD at [facility] ENDO (2ND FLR)    ESOPHAGOGASTRODUODENOSCOPY (EGD) N/A 4/4/2017    Performed by Walker Stern MD at [facility] ENDO (2ND FLR)    ESOPHAGOGASTRODUODENOSCOPY (EGD) N/A 10/17/2014    Performed by Man MAGAÑA  MD Grayson at [facility] ENDO (2ND FLR)    FISTULOGRAM Right 9/18/2014    Performed by Grayson Hubbard MD at [facility] CATH LAB    FOOT AMPUTATION THROUGH METATARSAL      left foot    LEG AMPUTATION THROUGH KNEE  12/18/2013    left BKA    R AVF  9/12/12    UPPER GASTROINTESTINAL ENDOSCOPY         Review of patient's allergies indicates:   Allergen Reactions    Fosrenol [lanthanum] Nausea And Vomiting     Nausea and vomiting     Current Facility-Administered Medications   Medication Frequency    0.9%  NaCl infusion Once    acetaminophen tablet 325 mg Q8H PRN    [START ON 3/7/2019] amLODIPine tablet 10 mg Daily    atorvastatin tablet 80 mg QHS    carvedilol tablet 25 mg BID WM    dextrose 50% injection 12.5 g PRN    dextrose 50% injection 25 g PRN    glucagon (human recombinant) injection 1 mg PRN    glucose chewable tablet 16 g PRN    glucose chewable tablet 24 g PRN    pantoprazole injection 40 mg BID    polyethylene glycol packet 17 g BID PRN    promethazine (PHENERGAN) 12.5 mg in dextrose 5 % 50 mL IVPB Q8H PRN    promethazine tablet 12.5 mg Q6H PRN    ramelteon tablet 8 mg Nightly PRN    sevelamer carbonate tablet 800 mg TID WM    sodium chloride 0.9% flush 5 mL PRN    sodium chloride 0.9% flush 5 mL PRN     Current Outpatient Medications   Medication    amLODIPine (NORVASC) 10 MG tablet    atorvastatin (LIPITOR) 80 MG tablet    carvedilol (COREG) 25 MG tablet    chlorproMAZINE (THORAZINE) 25 MG tablet    dicyclomine (BENTYL) 10 MG capsule    hydrALAZINE (APRESOLINE) 50 MG tablet    ondansetron (ZOFRAN) 8 MG tablet    pantoprazole (PROTONIX) 40 MG tablet    RENAPLEX-D 800 mcg-12.5 mg -2,000 unit Tab    sevelamer carbonate (RENVELA) 800 mg Tab     Family History     Problem Relation (Age of Onset)    Alcohol abuse Maternal Grandmother    Diabetes Brother, Maternal Grandfather    Early death Mother    Heart disease Father    Hyperlipidemia Father    Hypertension Father, Sister    Kidney  disease Father        Tobacco Use    Smoking status: Former Smoker     Packs/day: 1.00     Years: 10.00     Pack years: 10.00    Smokeless tobacco: Never Used   Substance and Sexual Activity    Alcohol use: No    Drug use: No    Sexual activity: Yes     Partners: Female     Birth control/protection: None     Review of Systems     As detailed under HPI  Remains poor historian  Unable to complete ROS    Objective:     Vital Signs (Most Recent):  Temp: 97.7 °F (36.5 °C) (03/06/19 1425)  Pulse: 86 (03/06/19 1630)  Resp: 16 (03/06/19 0644)  BP: (!) 108/57 (03/06/19 1630)  SpO2: 100 % (03/06/19 1332)  O2 Device (Oxygen Therapy): room air (03/06/19 0644) Vital Signs (24h Range):  Temp:  [97.7 °F (36.5 °C)-98.9 °F (37.2 °C)] 97.7 °F (36.5 °C)  Pulse:  [78-88] 86  Resp:  [16] 16  SpO2:  [97 %-100 %] 100 %  BP: (100-140)/(53-78) 108/57     Weight: 63.9 kg (140 lb 14 oz) (03/06/19 0644)  Body mass index is 22.74 kg/m².  Body surface area is 1.72 meters squared.    No intake/output data recorded.    Physical Exam    Gen chronically ill appearing  Non toxic, NAD  Neck supple, no JVD  Lungs no crackles  Oxygen saturation noted  CV S1S2+  abd soft, NT  extr left AKA  No edema on right leg  Access RUE    Significant Labs:  Cardiac Markers: No results for input(s): CKMB, TROPONINT, MYOGLOBIN in the last 168 hours.  CBC:   Recent Labs   Lab 03/06/19  0709 03/06/19  0710   WBC 9.73  --    RBC 3.20*  --    HGB 10.1*  --    HCT 29.0* 29*     --    MCV 91  --    MCH 31.6*  --    MCHC 34.8  --      CMP:   Recent Labs   Lab 03/06/19  0709   *   CALCIUM 10.9*   ALBUMIN 3.3*   PROT 8.6*   *   K 4.6   CO2 29   CL 80*   *   CREATININE 11.2*   ALKPHOS 143*   ALT 17   AST 23   BILITOT 0.5     Coagulation:   Recent Labs   Lab 03/06/19  0709   INR 1.0     LFTs:   Recent Labs   Lab 03/06/19  0709   ALT 17   AST 23   ALKPHOS 143*   BILITOT 0.5   PROT 8.6*   ALBUMIN 3.3*     PTH: No results for input(s): PTH in the  last 168 hours.  No results for input(s): COLORU, CLARITYU, SPECGRAV, PHUR, PROTEINUA, GLUCOSEU, BILIRUBINCON, BLOODU, WBCU, RBCU, BACTERIA, MUCUS, NITRITE, LEUKOCYTESUR, UROBILINOGEN, HYALINECASTS in the last 168 hours.  All labs within the past 24 hours have been reviewed.    Significant Imaging:  Labs: Reviewed  X-Ray: Reviewed    Assessment/Plan:     Active Diagnoses:    Diagnosis Date Noted POA    PRINCIPAL PROBLEM:  Intractable vomiting with nausea [R11.2] 03/06/2019 Yes    Weakness [R53.1] 03/06/2019 Yes    Erosive gastritis [K29.60] 05/23/2018 Yes    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, without long-term current use of insulin [E11.29] 05/21/2018 Yes     Chronic    Chronic upper GI bleeding [K92.2] 05/21/2018 Yes    Renovascular hypertension [I15.0] 03/16/2018 Yes     Chronic    ESRD on hemodialysis [N18.6, Z99.2] 02/07/2013 Not Applicable     Chronic    Anemia in chronic kidney disease [N18.9, D63.1] 09/17/2012 Yes     Chronic      Problems Resolved During this Admission:     ESRD  Disproportionately elevated BUN  Prior h/o esophagitis  Nausea, emesis  Hyponatremia, hypochloremia  Hypercalcemia  Anemia due to multiple mechanisms     ESRD patient presenting with feeling weak, overall not feeling well for a few days, has slight worsening of baseline anemia and has had nausea, emesis. He has not been dialyzed since 3/1/19 per outpatient clinic record review. He has hypochloremia which could be due to vomiting. Overall he appears to be volume depleted and below his dry weight. But given the build up of uremic toxins and possible platelet dysfunction as a result, plan for hemodialysis today for uremic toxin clearance. We will not pull any net UF given this. GI evaluating for any possible upper GI bleed/ recurrence of esophagitis. Trend H/H. Hypertension management per primary. Phos levels noted, resume phos binder if he is going to have any oral intake. Trend renal panel daily.        Thank you for your consult. I will follow-up with patient. Please contact us if you have any additional questions.    Bari Delong MD  Nephrology  [facility]

## 2019-03-06 NOTE — HPI
Mr Retana is a 53 year old man with history of ESRD on HD, HTN, CVA, 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    History is limited from patient. He reports that he was feeling well until ~1 week ago when he began to feel unwell. He is unable to describe any focality of his unwell feeling. On further history he endorses ~1 week of nausea and vomiting, vomiting is non-bloody, bilious. Denies any abdominal pain, diarrhea, hematochezia or melena. He denies any NSAIDs or smoking or drinking.     He was previously evaluated in the hospital (8/2018) due to concern for hematemesis but history and examination was not consistent with UGIB, he was monitored and did not undergo endoscopic evaluation. He was seen in clinic follow-up (9/12/18) and was doing well. He was continued on protonix BID and recommended for EGD to re-evaluate esophagitis (not yet done).    On admission, labs notable for H&H 10.1 (baseline 11-12), BUN/Crt 102/11.2 (previously 33/6.5 12/11/18). He is due for HD today.    Prior Endo hx  EGD (5/22) Dr. Sparks. Indication: hematemesis  - Grade D reflux esophagitis     EGD (3/16/18) Dr. De La Paz. Indication: coffee-grind emesis  - gastritis. H. Pylori negative.  - 6mm gastric polyp. 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 Galicia. Indication: UGIB  - Grade B reflux esophagitis  - gastric polyp

## 2019-03-06 NOTE — H&P
History and Physical   Hospital Medicine     Patient Name: Vaughn Retana  MRN:  [id number]  [facility] Medicine Team: [facility] HOSP MED R Marielena Velasquez MD  Date of Admission:  3/6/2019     Principal Problem:  Intractable vomiting with nausea   Primary Care Physician: Yvette Zelaya NP      History of Present Illness:     Mr. Vaughn Retana is a 53 y.o. male with ESRD (MWF), history of GI bleed/ erosive esophagitis, HTN , CVA/ intracerebral hemorrhage,  HFpEF who presented to ED on 3/6/19 with generalized fatigue or malaise, after he was sent from his dialysis center to the ED due to ongoing nausea and vomiting as well as severe fatigue.  Patient reported that he hasn't been taking in much PO due to his nausea and vomiting. He said he usually has a lot of secretion but he has been having more secretion than usual. He does not report blood with his emesis.  Patient specifically denied any abdominal pain and has been having good bowel movements with no melena or hematochezia. Denied fever, chill, CP, SOB, abdominal pain, HA, dizziness,cough, SOB, CP.     Last HD was noted to be on 3/1/2018, unclear why patient was not able to come to HD. Still makes small amounts of urine. [facility].  It appears that patient resides in a nursing home    In the ED patient was noted to have stable vital signs. Labs are remarkable for potassium of 4.6, anion gap of 23 with CO2 of 29, creatinine of 11.2 and BUN of 102.  EKG showed no ischemic changes and troponin was noted at 0.16 which is baseline for this ESRD patient.  KUB showed no ileus or bowel obstruction and abdominal physical exam was benign.  Patient was noted to have hemoglobin of 10 from 12 with positive stool guaiac, and GI was consulted by the ED physicians.  No bleeding or melanotic/bloody stools were noted    Review of Systems   Constitutional: as per HPI  HENT: Negative for sore throat, trouble swallowing.    Eyes: Negative for photophobia,  visual disturbance.   Respiratory: Negative for cough, shortness of breath.    Cardiovascular: Negative for chest pain, palpitations, leg swelling.   Gastrointestinal:as per HPI  Endocrine: Negative for cold intolerance, heat intolerance.   Genitourinary: Negative for dysuria, frequency.   Musculoskeletal: Negative for arthralgias, myalgias.   Skin: Negative for rash, wound, erythema   Neurological: Negative for dizziness, syncope, light-headedness.  diffuse weakness and fatigue   Psychiatric/Behavioral: Negative for confusion, hallucinations, anxiety  All other systems reviewed and are negative.      Past Medical History:   Past Medical History:   Diagnosis Date    Amputation stump pain 9/10/2013    Aspiration pneumonia 7/27/2015    Asterixis 11/8/2016    C. difficile colitis 8/7/2015    Cholelithiasis without obstruction 8/25/2015    Chronic diastolic heart failure     2-23-17   1 - Low normal to mildly depressed left ventricular systolic function (EF 50-55%).    2 - Right ventricular enlargement with mildly depressed systolic function.    3 - Left ventricular diastolic dysfunction.    4 - Right atrial enlargement.    5 - Severe tricuspid regurgitation.    6 - Pulmonary hypertension. The estimated PA systolic pressure is 86 mmHg.    7 - Increased central venous pressure.    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    Metabolic acidosis, IAG, reduced excretion of inorganic acids      Myoclonic jerking 9/20/2016    Noncompliance with medication regimen 12/4/2018    Secondary hyperparathyroidism (of renal origin)     Secondary pulmonary hypertension 3/23/2017    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    TB lung, latent 08/25/2015    Negative Quantiferon Gold 3-23-17       Past Surgical History:   Past Surgical History:   Procedure Laterality Date    AMPUTATION, BELOW KNEE Left 12/18/2013    Performed by Elgin Houston MD at [facility] OR 1ST FLR    COLONOSCOPY      COLONOSCOPY N/A 4/4/2017    Performed by Walker Stern MD at [facility] ENDO (2ND FLR)    EGD (ESOPHAGOGASTRODUODENOSCOPY) N/A 6/12/2018    Performed by Man Galicia MD at [facility] ENDO (2ND FLR)    ESOPHAGOGASTRODUODENOSCOPY (EGD) N/A 5/22/2018    Performed by Ke Sparks MD at [facility] ENDO (2ND FLR)    ESOPHAGOGASTRODUODENOSCOPY (EGD) N/A 3/16/2018    Performed by Kevin De La Paz MD at [facility] ENDO (2ND FLR)    ESOPHAGOGASTRODUODENOSCOPY (EGD) N/A 3/8/2018    Performed by Man Galicia MD at [facility] ENDO (2ND FLR)    ESOPHAGOGASTRODUODENOSCOPY (EGD) N/A 4/4/2017    Performed by Walker Stern MD at [facility] ENDO (2ND FLR)    ESOPHAGOGASTRODUODENOSCOPY (EGD) N/A 10/17/2014    Performed by Man Galicia MD at [facility] ENDO (2ND FLR)    FISTULOGRAM Right 9/18/2014    Performed by Grayson Hubbard MD at [facility] CATH LAB    FOOT AMPUTATION THROUGH METATARSAL      left foot    LEG AMPUTATION THROUGH KNEE  12/18/2013    left BKA    R AVF  9/12/12    UPPER GASTROINTESTINAL ENDOSCOPY         Social History:   Social History     Tobacco Use    Smoking status: Former Smoker     Packs/day: 1.00     Years: 10.00     Pack years: 10.00    Smokeless tobacco: Never Used   Substance Use Topics    Alcohol use: No    Drug use: No       Family History:   Family History   Problem Relation Age of Onset    Early death Mother     Kidney disease Father     Hypertension Father     Heart disease Father     Hyperlipidemia Father      Diabetes Brother     Alcohol abuse Maternal Grandmother     Diabetes Maternal Grandfather     Hypertension Sister     Melanoma Neg Hx          Medications: Scheduled Meds:   sodium chloride 0.9%   Intravenous Once    [START ON 3/7/2019] amLODIPine  10 mg Oral Daily    atorvastatin  80 mg Oral QHS    carvedilol  25 mg Oral BID WM    pantoprazole  40 mg Oral BID AC    sevelamer carbonate  800 mg Oral TID WM     Continuous Infusions:  PRN Meds:.acetaminophen, dextrose 50%, dextrose 50%, glucagon (human recombinant), glucose, glucose, polyethylene glycol, promethazine (PHENERGAN) IVPB, promethazine, ramelteon, sodium chloride 0.9%, sodium chloride 0.9%    Allergies: Patient is allergic to fosrenol [lanthanum].    Physical Exam:     Vital Signs (Most Recent):  Temp: 97.7 °F (36.5 °C) (03/06/19 1425)  Pulse: 88 (03/06/19 1615)  Resp: 16 (03/06/19 0644)  BP: (!) 100/55 (03/06/19 1615)  SpO2: 100 % (03/06/19 1332) Vital Signs Range (Last 24H):  Temp:  [97.7 °F (36.5 °C)-98.9 °F (37.2 °C)]   Pulse:  [78-88]   Resp:  [16]   BP: (100-140)/(53-78)   SpO2:  [97 %-100 %]    Body mass index is 22.74 kg/m².     Physical Exam:  Constitutional:  Patient appears chronically ill and appears older than stated age.  Mild distress due to ongoing nausea and some vomiting of clear secretions   HENT: NC/AT, external ears normal  Eyes: PERRL, EOMI, conjunctiva normal  Neck: normal ROM, supple  CV: RRR, no m/r/g, no carotid bruits, +2 peripheral pulses.  Pulmonary/Chest wall: Breathing comfortably w/o distress, CTAB, no w/r/r, no crackles.  GI: Soft, non-tender, non-distended, (+) BS, (+) BM   Musculoskeletal: Normal ROM, no edema, no tenderness throughout . LLE amputated  Neurological: AAO x 4, CN II-XI in tact, nl sensation, nl strength/tone   Skin: warm, dry  Psych: normal mood and affect, normal behavior, thought content and judgement.  Vitals reviewed.    Labs:    Cardiac Enzymes: Ejection Fractions:    Recent Labs      03/06/19  0709 03/06/19  1039   TROPONINI 0.164* 0.168*    No results found for: EF     Chemistries:   Recent Labs   Lab 03/06/19  0709 03/06/19  1039   *  --    K 4.6  --    CL 80*  --    CO2 29  --    *  --    CREATININE 11.2*  --    CALCIUM 10.9*  --    PROT 8.6*  --    BILITOT 0.5  --    ALKPHOS 143*  --    ALT 17  --    AST 23  --    MG  --  2.8*   PHOS  --  3.7        WBC:   Recent Labs   Lab 03/06/19  0709   WBC 9.73     Bands:     CBC/Anemia Labs: Coags:    Recent Labs   Lab 03/06/19  0709 03/06/19  0710 03/06/19  1039   WBC 9.73  --   --    HGB 10.1*  --   --    HCT 29.0* 29*  --      --   --    MCV 91  --   --    RDW 15.1*  --   --    IRON  --   --  84   FERRITIN  --   --  1,304*   RETIC 1.9  --   --     Recent Labs   Lab 03/06/19  0709   INR 1.0        POCT Glucose: HbA1c:    Recent Labs   Lab 03/06/19  0709   POCTGLUCOSE 219*    Hemoglobin A1C   Date Value Ref Range Status   12/04/2018 <4.0 (A) 4.0 - 5.6 % Final     Comment:   08/22/2018 4.5 4.0 - 5.6 % Final     Comment:   05/22/2018 <4.0 (A) 4.0 - 5.6 % Final     Comment:          Assessment and Plan:     Mr. Vaughn Retana is a 53 y.o. male who presented to [facility] on 3/6/2019 with n/v due to missed HD sessions     Active Hospital Problems    Diagnosis  POA    *Intractable vomiting with nausea [R11.2]  Yes     Priority: 1 - High    ESRD on hemodialysis [N18.6, Z99.2]  Not Applicable     Priority: 1 - High     Chronic     MWF at [facility]      Erosive gastritis [K29.60]  Yes     Priority: 2     Chronic upper GI bleeding [K92.2]  Yes     Priority: 2     Anemia in chronic kidney disease [N18.9, D63.1]  Yes     Priority: 2      Chronic    Renovascular hypertension [I15.0]  Yes     Priority: 3      Chronic    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, without long-term current use of insulin [E11.29]  Yes     Priority: 4      Chronic    Weakness [R53.1]  Yes      Resolved Hospital Problems   No resolved  problems to display.     Intractable vomiting with nausea  ESRD on hemodialysis  - nausea and vomiting and fatigue are likely related due to missed dialysis sessions. Likely related to Cr of 11 with BUN of 100 as well as AG of 23 with fairly normal electrolytes otherwise.   - nephrology consulted  - HD started on 3/6/19 on day of admission  - PO and IV phenergan for nausea / vom control. Given prolonged QTc on EKG, avoiding zofran (it did not help much either)  - monitor symptoms    Erosive esophagitis  Chronic upper GI bleed  Anemia of chronic Kidney disease  - Patient was noted to have hemoglobin of 10 from 12 with positive stool guaiac, and GI was consulted by the ED physicians.  No bleeding or melanotic/bloody stools were noted  - check iron studies and retic   - IV protonix BID for now  - GI plans to scope on 3/7/19 for re-evaluation of erosive esophagitis  - NPO after midnight    Renal vascular hypertension  - continue home amlodipine 10 mg daily  - continue home carvedilol 25 mg b.i.d.    Controlled type 2 diabetes without long-term use of insulin  - a1c of < 4, likely related to ESRD status  - not on insulin at home  - renal diet and sliding scale insulin while hospitalized     Diet:  Renal, NPO af MN  GI PPx:  PPI  DVT PPx:  scds  Goals of Care:  full    High Risk Conditions:  Missed HD, N/V    Disposition:    Admitted to OBS, likely d.c on 3/7    Signing Physician:     Marielena Velasquez MD  Department of Hospital Medicine   [facility]  Pager [phone] Icmxqnh [phone]  3/6/2019

## 2019-03-07 ENCOUNTER — ANESTHESIA EVENT (OUTPATIENT)
Dept: ENDOSCOPY | Facility: HOSPITAL | Age: 54
End: 2019-03-07
Payer: MEDICARE

## 2019-03-07 ENCOUNTER — ANESTHESIA (OUTPATIENT)
Dept: ENDOSCOPY | Facility: HOSPITAL | Age: 54
End: 2019-03-07
Payer: MEDICARE

## 2019-03-07 VITALS
SYSTOLIC BLOOD PRESSURE: 117 MMHG | OXYGEN SATURATION: 99 % | HEART RATE: 79 BPM | HEIGHT: 66 IN | RESPIRATION RATE: 17 BRPM | BODY MASS INDEX: 22.64 KG/M2 | DIASTOLIC BLOOD PRESSURE: 59 MMHG | WEIGHT: 140.88 LBS | TEMPERATURE: 98 F

## 2019-03-07 LAB
ALBUMIN SERPL BCP-MCNC: 3 G/DL
ANION GAP SERPL CALC-SCNC: 11 MMOL/L
BASOPHILS # BLD AUTO: 0.02 K/UL
BASOPHILS NFR BLD: 0.4 %
BUN SERPL-MCNC: 48 MG/DL
CALCIUM SERPL-MCNC: 10 MG/DL
CHLORIDE SERPL-SCNC: 100 MMOL/L
CO2 SERPL-SCNC: 25 MMOL/L
CREAT SERPL-MCNC: 7.9 MG/DL
DIFFERENTIAL METHOD: ABNORMAL
EOSINOPHIL # BLD AUTO: 0.2 K/UL
EOSINOPHIL NFR BLD: 3.2 %
ERYTHROCYTE [DISTWIDTH] IN BLOOD BY AUTOMATED COUNT: 15.1 %
EST. GFR  (AFRICAN AMERICAN): 8.1 ML/MIN/1.73 M^2
EST. GFR  (NON AFRICAN AMERICAN): 7 ML/MIN/1.73 M^2
GLUCOSE SERPL-MCNC: 114 MG/DL
HCT VFR BLD AUTO: 27.6 %
HGB BLD-MCNC: 9 G/DL
IMM GRANULOCYTES # BLD AUTO: 0.01 K/UL
IMM GRANULOCYTES NFR BLD AUTO: 0.2 %
LYMPHOCYTES # BLD AUTO: 1.4 K/UL
LYMPHOCYTES NFR BLD: 24 %
MAGNESIUM SERPL-MCNC: 2.5 MG/DL
MCH RBC QN AUTO: 30.7 PG
MCHC RBC AUTO-ENTMCNC: 32.6 G/DL
MCV RBC AUTO: 94 FL
MONOCYTES # BLD AUTO: 0.9 K/UL
MONOCYTES NFR BLD: 16.4 %
NEUTROPHILS # BLD AUTO: 3.1 K/UL
NEUTROPHILS NFR BLD: 55.8 %
NRBC BLD-RTO: 0 /100 WBC
PHOSPHATE SERPL-MCNC: 4.3 MG/DL
PLATELET # BLD AUTO: 185 K/UL
PMV BLD AUTO: 11.8 FL
POCT GLUCOSE: 127 MG/DL (ref 70–110)
POTASSIUM SERPL-SCNC: 4 MMOL/L
RBC # BLD AUTO: 2.93 M/UL
SODIUM SERPL-SCNC: 136 MMOL/L
WBC # BLD AUTO: 5.62 K/UL

## 2019-03-07 PROCEDURE — 37000008 HC ANESTHESIA 1ST 15 MINUTES: Performed by: INTERNAL MEDICINE

## 2019-03-07 PROCEDURE — 43235 EGD DIAGNOSTIC BRUSH WASH: CPT | Performed by: INTERNAL MEDICINE

## 2019-03-07 PROCEDURE — 63600175 PHARM REV CODE 636 W HCPCS: Performed by: NURSE ANESTHETIST, CERTIFIED REGISTERED

## 2019-03-07 PROCEDURE — 99217 PR OBSERVATION CARE DISCHARGE: ICD-10-PCS | Mod: ,,, | Performed by: HOSPITALIST

## 2019-03-07 PROCEDURE — 80069 RENAL FUNCTION PANEL: CPT

## 2019-03-07 PROCEDURE — 63600175 PHARM REV CODE 636 W HCPCS: Performed by: HOSPITALIST

## 2019-03-07 PROCEDURE — D9220A PRA ANESTHESIA: Mod: CRNA,,, | Performed by: NURSE ANESTHETIST, CERTIFIED REGISTERED

## 2019-03-07 PROCEDURE — 25000003 PHARM REV CODE 250: Performed by: NURSE ANESTHETIST, CERTIFIED REGISTERED

## 2019-03-07 PROCEDURE — 83735 ASSAY OF MAGNESIUM: CPT

## 2019-03-07 PROCEDURE — G0378 HOSPITAL OBSERVATION PER HR: HCPCS

## 2019-03-07 PROCEDURE — D9220A PRA ANESTHESIA: Mod: ANES,,, | Performed by: ANESTHESIOLOGY

## 2019-03-07 PROCEDURE — 36415 COLL VENOUS BLD VENIPUNCTURE: CPT

## 2019-03-07 PROCEDURE — C9113 INJ PANTOPRAZOLE SODIUM, VIA: HCPCS | Performed by: HOSPITALIST

## 2019-03-07 PROCEDURE — 99217 PR OBSERVATION CARE DISCHARGE: CPT | Mod: ,,, | Performed by: HOSPITALIST

## 2019-03-07 PROCEDURE — 43235 EGD DIAGNOSTIC BRUSH WASH: CPT | Mod: ,,, | Performed by: INTERNAL MEDICINE

## 2019-03-07 PROCEDURE — 25000003 PHARM REV CODE 250: Performed by: INTERNAL MEDICINE

## 2019-03-07 PROCEDURE — 85025 COMPLETE CBC W/AUTO DIFF WBC: CPT

## 2019-03-07 PROCEDURE — D9220A PRA ANESTHESIA: ICD-10-PCS | Mod: CRNA,,, | Performed by: NURSE ANESTHETIST, CERTIFIED REGISTERED

## 2019-03-07 PROCEDURE — 37000009 HC ANESTHESIA EA ADD 15 MINS: Performed by: INTERNAL MEDICINE

## 2019-03-07 PROCEDURE — 82962 GLUCOSE BLOOD TEST: CPT | Performed by: INTERNAL MEDICINE

## 2019-03-07 PROCEDURE — 25000003 PHARM REV CODE 250: Performed by: HOSPITALIST

## 2019-03-07 PROCEDURE — D9220A PRA ANESTHESIA: ICD-10-PCS | Mod: ANES,,, | Performed by: ANESTHESIOLOGY

## 2019-03-07 PROCEDURE — 43235 PR EGD, FLEX, DIAGNOSTIC: ICD-10-PCS | Mod: ,,, | Performed by: INTERNAL MEDICINE

## 2019-03-07 RX ORDER — PANTOPRAZOLE SODIUM 40 MG/1
40 TABLET, DELAYED RELEASE ORAL
Qty: 60 TABLET | Refills: 11 | Status: ON HOLD
Start: 2019-03-07 | End: 2019-01-01 | Stop reason: SDUPTHER

## 2019-03-07 RX ORDER — FENTANYL CITRATE 50 UG/ML
INJECTION, SOLUTION INTRAMUSCULAR; INTRAVENOUS
Status: DISCONTINUED | OUTPATIENT
Start: 2019-03-07 | End: 2019-03-07

## 2019-03-07 RX ORDER — PROPOFOL 10 MG/ML
VIAL (ML) INTRAVENOUS
Status: DISCONTINUED | OUTPATIENT
Start: 2019-03-07 | End: 2019-03-07

## 2019-03-07 RX ORDER — SUCRALFATE 1 G/10ML
500 SUSPENSION ORAL 2 TIMES DAILY
Qty: 1 BOTTLE | Refills: 3 | Status: ON HOLD
Start: 2019-03-07 | End: 2019-01-01 | Stop reason: SDUPTHER

## 2019-03-07 RX ORDER — GLYCOPYRROLATE 0.2 MG/ML
INJECTION INTRAMUSCULAR; INTRAVENOUS
Status: DISCONTINUED | OUTPATIENT
Start: 2019-03-07 | End: 2019-03-07

## 2019-03-07 RX ORDER — LIDOCAINE HCL/PF 100 MG/5ML
SYRINGE (ML) INTRAVENOUS
Status: DISCONTINUED | OUTPATIENT
Start: 2019-03-07 | End: 2019-03-07

## 2019-03-07 RX ADMIN — PANTOPRAZOLE SODIUM 40 MG: 40 INJECTION, POWDER, FOR SOLUTION INTRAVENOUS at 08:03

## 2019-03-07 RX ADMIN — PROPOFOL 50 MG: 10 INJECTION, EMULSION INTRAVENOUS at 11:03

## 2019-03-07 RX ADMIN — SEVELAMER CARBONATE 800 MG: 800 TABLET, FILM COATED ORAL at 08:03

## 2019-03-07 RX ADMIN — LIDOCAINE HYDROCHLORIDE 100 MG: 20 INJECTION, SOLUTION INTRAVENOUS at 11:03

## 2019-03-07 RX ADMIN — CARVEDILOL 25 MG: 25 TABLET, FILM COATED ORAL at 04:03

## 2019-03-07 RX ADMIN — AMLODIPINE BESYLATE 10 MG: 10 TABLET ORAL at 08:03

## 2019-03-07 RX ADMIN — CARVEDILOL 25 MG: 25 TABLET, FILM COATED ORAL at 08:03

## 2019-03-07 RX ADMIN — GLYCOPYRROLATE 0.2 MG: 0.2 INJECTION, SOLUTION INTRAMUSCULAR; INTRAVENOUS at 11:03

## 2019-03-07 RX ADMIN — FENTANYL CITRATE 25 MCG: 50 INJECTION, SOLUTION INTRAMUSCULAR; INTRAVENOUS at 11:03

## 2019-03-07 RX ADMIN — SEVELAMER CARBONATE 800 MG: 800 TABLET, FILM COATED ORAL at 04:03

## 2019-03-07 RX ADMIN — SEVELAMER CARBONATE 800 MG: 800 TABLET, FILM COATED ORAL at 02:03

## 2019-03-07 RX ADMIN — FENTANYL CITRATE 50 MCG: 50 INJECTION, SOLUTION INTRAMUSCULAR; INTRAVENOUS at 11:03

## 2019-03-07 RX ADMIN — SODIUM CHLORIDE: 0.9 INJECTION, SOLUTION INTRAVENOUS at 10:03

## 2019-03-07 NOTE — NURSING
Received from dialysis via stretcher.                                                          Housekeeping called for stat cleaning.

## 2019-03-07 NOTE — DISCHARGE SUMMARY
DISCHARGE SUMMARY  Hospital Medicine    Team: [facility] HOSP MED R    Patient Name: Vaughn Retana  YOB: 1965    Admit Date: 3/6/2019    Discharge Date: 03/07/2019    Discharge Attending Physician: Marielena Velasquez MD     Chief Complaint: Intractable vomiting with nausea     Princilpal Diagnoses:  Active Hospital Problems    Diagnosis  POA    *Intractable vomiting with nausea [R11.2]  Yes     Priority: 1 - High    ESRD on hemodialysis [N18.6, Z99.2]  Not Applicable     Priority: 1 - High     Chronic     MWF at [facility]      Erosive gastritis [K29.60]  Yes     Priority: 2     Chronic upper GI bleeding [K92.2]  Yes     Priority: 2     Anemia in chronic kidney disease [N18.9, D63.1]  Yes     Priority: 2      Chronic    Renovascular hypertension [I15.0]  Yes     Priority: 3      Chronic    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, without long-term current use of insulin [E11.29]  Yes     Priority: 4      Chronic    Weakness [R53.1]  Yes      Resolved Hospital Problems   No resolved problems to display.       Discharged Condition: Admit problems have resolved      HOSPITAL COURSE:      Initial Presentation:    53 y.o. male with ESRD (MWF), history of GI bleed/ erosive esophagitis, HTN , CVA/ intracerebral hemorrhage,  HFpEF who presented to ED on 3/6/19 with generalized fatigue or malaise, after he was sent from his dialysis center to the ED due to ongoing nausea and vomiting as well as severe fatigue.  Patient reported that he hasn't been taking in much PO due to his nausea and vomiting. He said he usually has a lot of secretion but he has been having more secretion than usual. He does not report blood with his emesis.  Patient specifically denied any abdominal pain and has been having good bowel movements with no melena or hematochezia. Denied fever, chill, CP, SOB, abdominal pain, HA, dizziness,cough, SOB, CP.      Last HD was noted to be on 3/1/2018, unclear why patient  "was not able to come to HD. Still makes small amounts of urine. HD [facility] at [facility].  It appears that patient resides in a nursing home     In the ED patient was noted to have stable vital signs. Labs are remarkable for potassium of 4.6, anion gap of 23 with CO2 of 29, creatinine of 11.2 and BUN of 102.  EKG showed no ischemic changes and troponin was noted at 0.16 which is baseline for this ESRD patient.  KUB showed no ileus or bowel obstruction and abdominal physical exam was benign.  Patient was noted to have hemoglobin of 10 from 12 with positive stool guaiac, and GI was consulted by the ED physicians.  No bleeding or melanotic/bloody stools were noted    Course of Principle Problem for Admission:    Intractable vomiting with nausea  ESRD on hemodialysis  - nausea and vomiting and fatigue are likely related due to missed dialysis sessions. Likely related to Cr of 11 with BUN of 100 as well as AG of 23 with fairly normal electrolytes otherwise.   - nephrology consulted  - HD started on 3/6/19 on day of admission  - PO and IV phenergan for nausea / vom control. Given prolonged QTc on EKG, avoiding zofran (it did not help much either)  - nausea vomiting have resolved after completing hemodialysis session    On the day of d/c, patient was doing well with no acute issues.  Nausea and vomiting has completely resolved after undergoing dialysis.  Underwent EGD which showed "LA Grade D reflux esophagitis involving the entire surface of the esophagus - the has worsened  significantly since his last exam."  Patient was continued on Protonix b.i.d. and sucralfate was added on.  Patient was discharged back to skilled nursing facility in good condition    Physical Exam:  Constitutional:  Patient appears chronically ill and appears older than stated age.  No longer nauseated or vomiting and appears comfortable   HENT: NC/AT, external ears normal  Eyes: PERRL, EOMI, conjunctiva normal  Neck: normal ROM, supple  CV: RRR, no " "m/r/g, no carotid bruits, +2 peripheral pulses.  Pulmonary/Chest wall: Breathing comfortably w/o distress, CTAB, no w/r/r, no crackles.  GI: Soft, non-tender, non-distended, (+) BS, (+) BM   Musculoskeletal: Normal ROM, no edema, no tenderness throughout . LLE amputated  Neurological: AAO x 4, CN II-XI in tact, nl sensation, nl strength/tone   Skin: warm, dry  Psych: normal mood and affect, normal behavior, thought content and judgement.  Vitals reviewed.      Other Medical Problems Addressed in the Hospital:    Erosive esophagitis  Chronic upper GI bleed  Anemia of chronic Kidney disease  - Patient was noted to have hemoglobin of 10 from 12 with positive stool guaiac, and GI was consulted by the ED physicians.  No bleeding or melanotic/bloody stools were noted  - iron studies and retic reviewed  - patient started on IV protonix BID   - Underwent EGD which showed "LA Grade D reflux esophagitis involving the entire surface of the esophagus - the has worsened  significantly since his last exam."    - Patient was continued on Protonix b.i.d. and sucralfate was added on.  - patient is to follow up in GI clinic in 8 weeks  - denied any hematemesis and melena or hematochezia on the day of discharge    Renal vascular hypertension  - continue home amlodipine 10 mg daily  - continue home carvedilol 25 mg b.i.d.     Controlled type 2 diabetes without long-term use of insulin  - a1c of < 4, likely related to ESRD status  - not on insulin at home  - renal diet and sliding scale insulin while hospitalized      CONSULTS: GI    PROCEDURES: EGD    Labs:    Chemistries:   Recent Labs   Lab 03/06/19  0709 03/06/19  1039 03/07/19  0648   *  --  136   K 4.6  --  4.0   CL 80*  --  100   CO2 29  --  25   *  --  48*   CREATININE 11.2*  --  7.9*   CALCIUM 10.9*  --  10.0   PROT 8.6*  --   --    BILITOT 0.5  --   --    ALKPHOS 143*  --   --    ALT 17  --   --    AST 23  --   --    MG  --  2.8* 2.5   PHOS  --  3.7 4.3    "     WBC:   Recent Labs   Lab 03/06/19  0709 03/07/19  0648   WBC 9.73 5.62     Bands:     CBC/Anemia Labs: Coags:    Recent Labs   Lab 03/06/19  0709 03/06/19  0710 03/06/19  1039 03/07/19  0648   WBC 9.73  --   --  5.62   HGB 10.1*  --   --  9.0*   HCT 29.0* 29*  --  27.6*     --   --  185   MCV 91  --   --  94   RDW 15.1*  --   --  15.1*   IRON  --   --  84  --    FERRITIN  --   --  1,304*  --    RETIC 1.9  --   --   --     Recent Labs   Lab 03/06/19  0709   INR 1.0        Diagnostic Results:      Pertinent/Significant Diagnostic Studies:      EGD 3/7/19                       - LA Grade D reflux esophagitis involving the                         entire surface of the esophagus - the has worsened                         significantly since his last exam.                        - 2 cm type-I sliding hiatal hernia.                        - Congestive gastropathy.                        - Erythematous duodenopathy.                        - No specimens collected.    Disposition:     SNF  - at [facility]      Follow-up Plans from This Hospitalization:  PCP  GI    Discharge Medication List:       Vaughn Retana   Home Medication Instructions ANABEL:[id number]    Printed on:03/07/19 0779   Medication Information                      amLODIPine (NORVASC) 10 MG tablet  Take 1 tablet (10 mg total) by mouth once daily.             atorvastatin (LIPITOR) 80 MG tablet  Take 1 tablet (80 mg total) by mouth every evening.             carvedilol (COREG) 25 MG tablet  Take 1 tablet (25 mg total) by mouth 2 (two) times daily with meals.             dicyclomine (BENTYL) 10 MG capsule  Take 1 capsule (10 mg total) by mouth before meals as needed (TID PRN). For belching             ondansetron (ZOFRAN) 8 MG tablet  Take 1 tablet (8 mg total) by mouth every 8 (eight) hours as needed for Nausea.             pantoprazole (PROTONIX) 40 MG tablet  Take 1 tablet (40 mg total) by mouth 2 (two) times daily before meals.              RENAPLEX-D 800 mcg-12.5 mg -2,000 unit Tab  Take 1 tablet by mouth once daily.             sevelamer carbonate (RENVELA) 800 mg Tab  TAKE 2 TABLETS BY MOUTH THREE TIMES A DAY WITH MEALS             sucralfate (CARAFATE) 100 mg/mL suspension  Take 5 mLs (500 mg total) by mouth 2 (two) times daily.                   At the time of discharge patient was told to take all medications as prescribed, to keep all followup appointments, and to call their primary care physician or return to the emergency room if they have any worsening or concerning symptoms.    Time spent on the discharge of the patient including review of hospital course with the patient. reviewing discharge medications and arranging follow-up care 45 minutes.  Patient was seen and examined on the date of discharge and determined to be suitable for discharge.        Signing Physician:  Marielena Velasquez MD

## 2019-03-07 NOTE — NURSING TRANSFER
Nursing Transfer Note      3/7/2019     Transfer To: [id number]    Transfer via stretcher    Transfer with non- tele box in room per ISMAEL Quezada    Transported by PCT    Medicines sent: none    Chart send with patient: Yes    Notified: RN on floor    Patient reassessed at: now and arrival to floor.     Upon arrival to floor: patient oriented to room, call bell in reach and bed in lowest position.

## 2019-03-07 NOTE — PROGRESS NOTES
Denisa 3 hr dialysis trx well. 0 net fluid removal. Needles removed from R UA AVF. Continues spitting up lg amts clear phlegm. Report called to Vidal.

## 2019-03-07 NOTE — PLAN OF CARE
On Discharge planning assessment patient is in bed, resting quietly,  Introduced myself and CM role in patients care plan, patient verbalized understanding.     Pt is currently in SNF at [facility], patient will go back SNF once discharged from the hospital. Patient denies HH and coumadin. Pt has HD at [facility] in [facility].  He has a prosthetic leg for mobility.  Verified Pts Address, Emergency Contact, Pharmacy and PCP.     Pt denies any concerns for this visit, CM will continue to follow. CM name and number placed on patients white board.        03/07/19 1300   Discharge Assessment   Assessment Type Discharge Planning Assessment   Confirmed/corrected address and phone number on facesheet? Yes   Assessment information obtained from? Patient   Expected Length of Stay (days) 3   Communicated expected length of stay with patient/caregiver yes   Prior to hospitilization cognitive status: Alert/Oriented   Prior to hospitalization functional status: Assistive Equipment   Current cognitive status: Alert/Oriented   Current Functional Status: Assistive Equipment   Facility Arrived From: [facility]    Lives With facility resident   Able to Return to Prior Arrangements yes   Is patient able to care for self after discharge? Yes   Who are your caregiver(s) and their phone number(s)? Alicia RetanaLoalwtxc-rrxllw-[phone]   Patient's perception of discharge disposition skilled nursing facility   Readmission Within the Last 30 Days no previous admission in last 30 days   Patient currently being followed by outpatient case management? No   Patient currently receives any other outside agency services? No   Equipment Currently Used at Home prosthesis   Do you have any problems affording any of your prescribed medications? No   Is the patient taking medications as prescribed? yes   Does the patient have transportation home? Yes   Transportation Anticipated health plan transportation   Dialysis Name and Scheduled days [facility]    Does the patient receive services at the Coumadin Clinic? No   Discharge Plan A Skilled Nursing Facility   Discharge Plan B Home Health   DME Needed Upon Discharge  none   Patient/Family in Agreement with Plan yes     Yvette Zelaya, NP  [address] [facility] [id number] [phone]    Extended Emergency Contact Information  Primary Emergency Contact: Alicia Retana   [place]  Mobile Phone: [phone]  Relation: Sister  Secondary Emergency Contact: VonDaisy   [facility]  Home Phone: [phone]  Mobile Phone: [phone]  Relation: Relative      [id number] - [place], [place] - [address]  [address]  [place] [address]  Phone: [phone] Fax: [phone]    [facility]  [address]  [facility] [address]  Phone: [phone] Fax: [phone]    [facility] [address] [place] [address] - [address] [facility] [address]  [address] [facility] [address]  Phone: [phone] Fax: [phone]

## 2019-03-07 NOTE — INTERVAL H&P NOTE
Pre-Procedure H and P Addendum    Patient seen and examined.  History and exam unchanged from prior history and physical.      Procedure: EGD  Indication: Anemia, follow-up esophagitis, history of gastric ulcer  ASA Class: per anesthesiology  Airway: normal  Neck Mobility: full range of motion  Mallampatti score: per anesthesia  History of anesthesia problems: no  Family history of anesthesia problems: no  Anesthesia Plan: MAC    Anesthesia/Surgery risks, benefits and alternative options discussed and understood by patient/family.          Active Hospital Problems    Diagnosis  POA    *Intractable vomiting with nausea [R11.2]  Yes    Weakness [R53.1]  Yes    Erosive gastritis [K29.60]  Yes    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, without long-term current use of insulin [E11.29]  Yes     Chronic    Chronic upper GI bleeding [K92.2]  Yes    Renovascular hypertension [I15.0]  Yes     Chronic    ESRD on hemodialysis [N18.6, Z99.2]  Not Applicable     Chronic     MWF at [facility]      Anemia in chronic kidney disease [N18.9, D63.1]  Yes     Chronic      Resolved Hospital Problems   No resolved problems to display.

## 2019-03-07 NOTE — ANESTHESIA PREPROCEDURE EVALUATION
03/07/2019  Vaughn Retana is a 53 y.o., male.    Anesthesia Evaluation         Review of Systems  Anesthesia Hx:  No problems with previous Anesthesia   Cardiovascular:   Exercise tolerance: poor Hypertension Denies CABG/stent.   Denies Angina. CHF EF 40%  Diastolic dysfunction  Elevated troponin which is at baseline per Medicine due to ESRD   Pulmonary:   Pneumonia    Renal/:   Chronic Renal Disease, ESRD, Dialysis    Hepatic/GI:   PUD, GERD hematemesis   Neurological:   CVA, no residual symptoms Denies Seizures.    Endocrine:   Diabetes, type 2        Physical Exam  General:  Well nourished    Airway/Jaw/Neck:  Airway Findings: Mouth Opening: Normal Tongue: Normal  General Airway Assessment: Adult  Mallampati: II  TM Distance: Normal, at least 6 cm       Chest/Lungs:  Chest/Lungs Clear    Heart/Vascular:  Heart Findings: Normal            Anesthesia Plan  Type of Anesthesia, risks & benefits discussed:  Anesthesia Type:  general  Patient's Preference:   Intra-op Monitoring Plan: standard ASA monitors  Intra-op Monitoring Plan Comments:   Post Op Pain Control Plan: multimodal analgesia and IV/PO Opioids PRN  Post Op Pain Control Plan Comments: Opioids and analgesic adjuvants as needed  Regional blocks if applicable/indicated  Induction:   IV  Beta Blocker:  Patient is not currently on a Beta-Blocker (No further documentation required).       Informed Consent: Patient understands risks and agrees with Anesthesia plan.  Questions answered. Anesthesia consent signed with patient.  ASA Score: 4     Day of Surgery Review of History & Physical:    H&P update referred to the surgeon.     Anesthesia Plan Notes: I have personally evaluated the patient and discussed risk/benefits/alternatives of general anesthesia.        Ready For Surgery From Anesthesia Perspective.

## 2019-03-07 NOTE — DISCHARGE INSTRUCTIONS

## 2019-03-07 NOTE — PLAN OF CARE
Problem: Fall Injury Risk  Goal: Absence of Fall and Fall-Related Injury  Outcome: Ongoing (interventions implemented as appropriate)  Patient uses call light for safety. Room free from clutter.purpose patient rounding.    Problem: Adult Inpatient Plan of Care  Goal: Plan of Care Review  Outcome: Ongoing (interventions implemented as appropriate)  Patient is aaox4 is able to state needs. No complaint of pain or discomfort. Skin intact no futher breakdown during shift. Was able to swallow meds without difficulty swallowing.

## 2019-03-07 NOTE — TRANSFER OF CARE
"Anesthesia Transfer of Care Note    Patient: Vaughn Retana    Procedure(s) Performed: Procedure(s) (LRB):  EGD (ESOPHAGOGASTRODUODENOSCOPY) (N/A)    Patient location: PACU    Anesthesia Type: general    Transport from OR: Transported from OR on room air with adequate spontaneous ventilation    Post pain: adequate analgesia    Post assessment: no apparent anesthetic complications    Post vital signs: stable    Level of consciousness: sedated    Nausea/Vomiting: no nausea/vomiting    Complications: none    Transfer of care protocol was followed      Last vitals:   Visit Vitals  BP (!) 84/34 (BP Location: Right leg, Patient Position: Lying)   Pulse 78   Temp 36.7 °C (98.1 °F) (Temporal)   Resp 14   Ht 5' 6" (1.676 m)   Wt 63.9 kg (140 lb 14 oz)   SpO2 98%   BMI 22.74 kg/m²     "

## 2019-03-07 NOTE — ANESTHESIA RELEASE NOTE
Anesthesia Release from PACU Note    Patient: Vaughn Retana    Procedure(s) Performed: Procedure(s) (LRB):  EGD (ESOPHAGOGASTRODUODENOSCOPY) (N/A)    Anesthesia type: General/MAC     Post pain: Adequate analgesia    Post assessment: no apparent anesthetic complications, tolerated procedure well and no evidence of recall    Last Vitals:   Vitals:    03/07/19 1230   BP: 101/61   Pulse: 73   Resp:    Temp:    SpO2:        Post vital signs: stable    Level of consciousness: awake, alert  and oriented    Nausea/Vomiting: no nausea/no vomiting    Complications: none    Airway Patency: patent    Respiratory: unassisted    Cardiovascular: stable and blood pressure at baseline    Hydration: euvolemic

## 2019-03-07 NOTE — PLAN OF CARE
CM spoke to the pts Nurse and  informed her that patient is discharging to [facility] today Number to call report is [phone], pt is going to room 423B, and transportation is set for 5pm.

## 2019-03-07 NOTE — ANESTHESIA POSTPROCEDURE EVALUATION
"Anesthesia Post Evaluation    Patient: Vaughn Retana    Procedure(s) Performed: Procedure(s) (LRB):  EGD (ESOPHAGOGASTRODUODENOSCOPY) (N/A)    Final Anesthesia Type: general  Patient location during evaluation: PACU  Patient participation: Yes- Able to Participate  Level of consciousness: awake and alert and oriented  Post-procedure vital signs: reviewed and stable  Pain management: adequate  Airway patency: patent  PONV status at discharge: No PONV  Anesthetic complications: no      Cardiovascular status: blood pressure returned to baseline  Respiratory status: unassisted, spontaneous ventilation and room air  Hydration status: euvolemic  Follow-up not needed.        Visit Vitals  /61   Pulse 73   Temp 36.7 °C (98.1 °F) (Temporal)   Resp 11   Ht 5' 6" (1.676 m)   Wt 63.9 kg (140 lb 14 oz)   SpO2 97%   BMI 22.74 kg/m²       Pain/Haseeb Score: Haseeb Score: 9 (3/7/2019 11:46 AM)        "

## 2019-03-07 NOTE — PROVATION PATIENT INSTRUCTIONS
Discharge Summary/Instructions after an Endoscopic Procedure  Patient Name: Vaughn Retana  Patient MRN: [id number]  Patient YOB: 1965 Thursday, March 07, 2019  Man Galicia MD  RESTRICTIONS:  During your procedure today, you received medications for sedation.  These   medications may affect your judgment, balance and coordination.  Therefore,   for 24 hours, you have the following restrictions:   - DO NOT drive a car, operate machinery, make legal/financial decisions,   sign important papers or drink alcohol.    ACTIVITY:  Today: no heavy lifting, straining or running due to procedural   sedation/anesthesia.  The following day: return to full activity including work.  DIET:  Eat and drink normally unless instructed otherwise.     TREATMENT FOR COMMON SIDE EFFECTS:  - Mild abdominal pain, nausea, belching, bloating or excessive gas:  rest,   eat lightly and use a heating pad.  - Sore Throat: treat with throat lozenges and/or gargle with warm salt   water.  - Because air was used during the procedure, expelling large amounts of air   from your rectum or belching is normal.  - If a bowel prep was taken, you may not have a bowel movement for 1-3 days.    This is normal.  SYMPTOMS TO WATCH FOR AND REPORT TO YOUR PHYSICIAN:  1. Abdominal pain or bloating, other than gas cramps.  2. Chest pain.  3. Back pain.  4. Signs of infection such as: chills or fever occurring within 24 hours   after the procedure.  5. Rectal bleeding, which would show as bright red, maroon, or black stools.   (A tablespoon of blood from the rectum is not serious, especially if   hemorrhoids are present.)  6. Vomiting.  7. Weakness or dizziness.  GO DIRECTLY TO THE NEAREST EMERGENCY ROOM IF YOU HAVE ANY OF THE FOLLOWING:      Difficulty breathing              Chills and/or fever over 101 F   Persistent vomiting and/or vomiting blood   Severe abdominal pain   Severe chest pain   Black, tarry stools   Bleeding- more than one  tablespoon   Any other symptom or condition that you feel may need urgent attention  Your doctor recommends these additional instructions:  If any biopsies were taken, your doctors clinic will contact you in 1 to 2   weeks with any results.  - [facility] patient to [facility] for [facility].   - Advance diet as tolerated.   - Continue present medications.   - Use a proton pump inhibitor PO BID.   - Return to GI clinic in 8 weeks.   For questions, problems or results please call your physician - Man Galicia MD at Work:  [phone].  [facility] [place], EMERGENCY ROOM PHONE NUMBER: [phone]  IF A COMPLICATION OR EMERGENCY SITUATION ARISES AND YOU ARE UNABLE TO REACH   YOUR PHYSICIAN - GO DIRECTLY TO THE EMERGENCY ROOM.  Man Galicia MD  3/7/2019 11:16:54 AM  This report has been verified and signed electronically.  [employer]

## 2019-03-07 NOTE — PLAN OF CARE
[facility]     Department of Hospital Medicine     [address] [place] [address]     [phone] after hours  [phone] fax       NURSING HOME ORDERS    03/07/2019    Admit to Nursing Home:   Skilled Bed                                                Diagnoses:  Active Hospital Problems    Diagnosis  POA    *Intractable vomiting with nausea [R11.2]  Yes     Priority: 1 - High    ESRD on hemodialysis [N18.6, Z99.2]  Not Applicable     Priority: 1 - High     Chronic     MWF at [facility]      Erosive gastritis [K29.60]  Yes     Priority: 2     Chronic upper GI bleeding [K92.2]  Yes     Priority: 2     Anemia in chronic kidney disease [N18.9, D63.1]  Yes     Priority: 2      Chronic    Renovascular hypertension [I15.0]  Yes     Priority: 3      Chronic    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, without long-term current use of insulin [E11.29]  Yes     Priority: 4      Chronic    Weakness [R53.1]  Yes      Resolved Hospital Problems   No resolved problems to display.       Patient is homebound due to:  Intractable vomiting with nausea    Allergies:  Review of patient's allergies indicates:   Allergen Reactions    Fosrenol [lanthanum] Nausea And Vomiting     Nausea and vomiting       Vitals:       Every shift (Skilled Nursing patients)    Diet: renal    Acitivities:    - Scheduled walks once each shift (every 8 hours)   - May ambulate independently   - May use walker, cane, or self-propelled wheelchair      LABS:  Per facility protocol    Nursing Precautions:    - Fall precautions per nursing home protocol       CONSULTS:     Physical Therapy to evaluate and treat     Occupational Therapy to evaluate and treat        MISCELLANEOUS CARE: none             Medications: Discontinue all previous medication orders, if any. See new list below.   Vaughn Retana   Home Medication Instructions ANABEL:[id number]    Printed on:03/07/19 9128    Medication Information                      amLODIPine (NORVASC) 10 MG tablet  Take 1 tablet (10 mg total) by mouth once daily.             atorvastatin (LIPITOR) 80 MG tablet  Take 1 tablet (80 mg total) by mouth every evening.             carvedilol (COREG) 25 MG tablet  Take 1 tablet (25 mg total) by mouth 2 (two) times daily with meals.             dicyclomine (BENTYL) 10 MG capsule  Take 1 capsule (10 mg total) by mouth before meals as needed (TID PRN). For belching             ondansetron (ZOFRAN) 8 MG tablet  Take 1 tablet (8 mg total) by mouth every 8 (eight) hours as needed for Nausea.             pantoprazole (PROTONIX) 40 MG tablet  Take 1 tablet (40 mg total) by mouth 2 (two) times daily before meals.             RENAPLEX-D 800 mcg-12.5 mg -2,000 unit Tab  Take 1 tablet by mouth once daily.             sevelamer carbonate (RENVELA) 800 mg Tab  TAKE 2 TABLETS BY MOUTH THREE TIMES A DAY WITH MEALS             sucralfate (CARAFATE) 100 mg/mL suspension  Take 5 mLs (500 mg total) by mouth 2 (two) times daily.                       _________________________________  Marielena Velasquez MD  03/07/2019

## 2019-03-07 NOTE — HISTORY AND PHYSICAL ADDENDUM
Contacted Dr. Du with anesthesia about patient's BP.  Patient still extremely drowsy, but responds to voice.  MD instructed to continue to monitor, allow patient to come out of the anesthesia before further interventions.  Will re-contact if BP does not trend up with patient's increased LOC.  WCTM closely.

## 2019-03-07 NOTE — TREATMENT PLAN
GI Treatment Plan    Vaughn Retana is a 53 y.o. male admitted to hospital 3/6/2019 (Hospital Day: 2) due to Intractable vomiting with nausea.     Interval History  - s/p EGD today (see formal report)  - LA Grade D esophagitis, friable with contact bleeding and severe ulceration at EG junction  - 2cm sliding hiatal hernia    Laboratory    Recent Labs   Lab 03/06/19  0709 03/07/19  0648   HGB 10.1* 9.0*       Lab Results   Component Value Date    WBC 5.62 03/07/2019    HGB 9.0 (L) 03/07/2019    HCT 27.6 (L) 03/07/2019    MCV 94 03/07/2019     03/07/2019       Lab Results   Component Value Date     03/07/2019    K 4.0 03/07/2019     03/07/2019    CO2 25 03/07/2019    BUN 48 (H) 03/07/2019    CREATININE 7.9 (H) 03/07/2019    CALCIUM 10.0 03/07/2019    ANIONGAP 11 03/07/2019    ESTGFRAFRICA 8.1 (A) 03/07/2019    EGFRNONAA 7.0 (A) 03/07/2019       Lab Results   Component Value Date    ALT 17 03/06/2019    AST 23 03/06/2019    ALKPHOS 143 (H) 03/06/2019    BILITOT 0.5 03/06/2019       Lab Results   Component Value Date    INR 1.0 03/06/2019    INR 1.0 12/04/2018    INR 1.0 08/22/2018       Plan  - s/p EGD, see formal report  - protonix 40mg BID  - carafate 1g QID  - will follow-up in GI clinic as outpatient  - Plan of care was discussed with primary team.  - We are signing-off. Please call with any questions.    Thank you for involving us in the care of Vaughn Retana. Please call with any additional questions, concerns or changes in the patient's clinical status.    Yimi Jacques MD  Gastroenterology Fellow, PGY IV  Spectralink: [id number]

## 2019-03-22 LAB
ACID FAST MOD KINY STN SPEC: NORMAL
MYCOBACTERIUM SPEC QL CULT: NORMAL

## 2019-04-27 ENCOUNTER — PATIENT MESSAGE (OUTPATIENT)
Dept: ENDOSCOPY | Facility: HOSPITAL | Age: 54
End: 2019-04-27

## 2019-06-21 ENCOUNTER — HOSPITAL ENCOUNTER (INPATIENT)
Facility: OTHER | Age: 54
LOS: 3 days | Discharge: NURSING, REHAB OR LONG-TERM CARE | DRG: 377 | End: 2019-06-24
Attending: EMERGENCY MEDICINE | Admitting: HOSPITALIST
Payer: MEDICARE

## 2019-06-21 DIAGNOSIS — K92.2 UGIB (UPPER GASTROINTESTINAL BLEED): Primary | ICD-10-CM

## 2019-06-21 DIAGNOSIS — K76.6 PORTAL HYPERTENSION: ICD-10-CM

## 2019-06-21 DIAGNOSIS — Z99.2 DIALYSIS PATIENT: ICD-10-CM

## 2019-06-21 DIAGNOSIS — N18.6 ESRD ON HEMODIALYSIS: Chronic | ICD-10-CM

## 2019-06-21 DIAGNOSIS — R11.10 EMESIS: ICD-10-CM

## 2019-06-21 DIAGNOSIS — I95.3 HEMODIALYSIS-ASSOCIATED HYPOTENSION: ICD-10-CM

## 2019-06-21 DIAGNOSIS — Z99.2 ESRD ON HEMODIALYSIS: Chronic | ICD-10-CM

## 2019-06-21 DIAGNOSIS — I15.0 RENOVASCULAR HYPERTENSION: Chronic | ICD-10-CM

## 2019-06-21 DIAGNOSIS — E87.6 HYPOKALEMIA: ICD-10-CM

## 2019-06-21 DIAGNOSIS — K92.2 CHRONIC UPPER GI BLEEDING: ICD-10-CM

## 2019-06-21 LAB
ABO + RH BLD: NORMAL
ALBUMIN SERPL BCP-MCNC: 3.8 G/DL (ref 3.5–5.2)
ALP SERPL-CCNC: 331 U/L (ref 55–135)
ALT SERPL W/O P-5'-P-CCNC: 28 U/L (ref 10–44)
ANION GAP SERPL CALC-SCNC: 12 MMOL/L (ref 8–16)
AST SERPL-CCNC: 30 U/L (ref 10–40)
BASOPHILS # BLD AUTO: 0.01 K/UL (ref 0–0.2)
BASOPHILS NFR BLD: 0.2 % (ref 0–1.9)
BILIRUB SERPL-MCNC: 0.5 MG/DL (ref 0.1–1)
BLD GP AB SCN CELLS X3 SERPL QL: NORMAL
BUN SERPL-MCNC: 13 MG/DL (ref 6–20)
CALCIUM SERPL-MCNC: 10.1 MG/DL (ref 8.7–10.5)
CHLORIDE SERPL-SCNC: 101 MMOL/L (ref 95–110)
CO2 SERPL-SCNC: 30 MMOL/L (ref 23–29)
CREAT SERPL-MCNC: 4.9 MG/DL (ref 0.5–1.4)
DIFFERENTIAL METHOD: ABNORMAL
EOSINOPHIL # BLD AUTO: 0.5 K/UL (ref 0–0.5)
EOSINOPHIL NFR BLD: 9.3 % (ref 0–8)
ERYTHROCYTE [DISTWIDTH] IN BLOOD BY AUTOMATED COUNT: 13.7 % (ref 11.5–14.5)
EST. GFR  (AFRICAN AMERICAN): 14 ML/MIN/1.73 M^2
EST. GFR  (NON AFRICAN AMERICAN): 12 ML/MIN/1.73 M^2
GLUCOSE SERPL-MCNC: 100 MG/DL (ref 70–110)
HCT VFR BLD AUTO: 38.5 % (ref 40–54)
HGB BLD-MCNC: 13 G/DL (ref 14–18)
INR PPP: 1 (ref 0.8–1.2)
LYMPHOCYTES # BLD AUTO: 1.4 K/UL (ref 1–4.8)
LYMPHOCYTES NFR BLD: 25.8 % (ref 18–48)
MAGNESIUM SERPL-MCNC: 2.2 MG/DL (ref 1.6–2.6)
MCH RBC QN AUTO: 31.7 PG (ref 27–31)
MCHC RBC AUTO-ENTMCNC: 33.8 G/DL (ref 32–36)
MCV RBC AUTO: 94 FL (ref 82–98)
MONOCYTES # BLD AUTO: 0.6 K/UL (ref 0.3–1)
MONOCYTES NFR BLD: 9.8 % (ref 4–15)
NEUTROPHILS # BLD AUTO: 3.1 K/UL (ref 1.8–7.7)
NEUTROPHILS NFR BLD: 54.7 % (ref 38–73)
PLATELET # BLD AUTO: 155 K/UL (ref 150–350)
PMV BLD AUTO: 11.2 FL (ref 9.2–12.9)
POCT GLUCOSE: 99 MG/DL (ref 70–110)
POTASSIUM SERPL-SCNC: 4.1 MMOL/L (ref 3.5–5.1)
PROT SERPL-MCNC: 9.5 G/DL (ref 6–8.4)
PROTHROMBIN TIME: 10.8 SEC (ref 9–12.5)
RBC # BLD AUTO: 4.1 M/UL (ref 4.6–6.2)
SODIUM SERPL-SCNC: 143 MMOL/L (ref 136–145)
TROPONIN I SERPL DL<=0.01 NG/ML-MCNC: 0.12 NG/ML (ref 0–0.03)
WBC # BLD AUTO: 5.59 K/UL (ref 3.9–12.7)

## 2019-06-21 PROCEDURE — 86850 RBC ANTIBODY SCREEN: CPT

## 2019-06-21 PROCEDURE — 25000003 PHARM REV CODE 250: Performed by: EMERGENCY MEDICINE

## 2019-06-21 PROCEDURE — 99223 PR INITIAL HOSPITAL CARE,LEVL III: ICD-10-PCS | Mod: ,,, | Performed by: NURSE PRACTITIONER

## 2019-06-21 PROCEDURE — 99223 1ST HOSP IP/OBS HIGH 75: CPT | Mod: ,,, | Performed by: NURSE PRACTITIONER

## 2019-06-21 PROCEDURE — 83735 ASSAY OF MAGNESIUM: CPT

## 2019-06-21 PROCEDURE — 82962 GLUCOSE BLOOD TEST: CPT

## 2019-06-21 PROCEDURE — 85610 PROTHROMBIN TIME: CPT

## 2019-06-21 PROCEDURE — 93005 ELECTROCARDIOGRAM TRACING: CPT

## 2019-06-21 PROCEDURE — 20000000 HC ICU ROOM

## 2019-06-21 PROCEDURE — 96365 THER/PROPH/DIAG IV INF INIT: CPT

## 2019-06-21 PROCEDURE — C9113 INJ PANTOPRAZOLE SODIUM, VIA: HCPCS | Performed by: EMERGENCY MEDICINE

## 2019-06-21 PROCEDURE — 96375 TX/PRO/DX INJ NEW DRUG ADDON: CPT

## 2019-06-21 PROCEDURE — 93010 ELECTROCARDIOGRAM REPORT: CPT | Mod: ,,, | Performed by: INTERNAL MEDICINE

## 2019-06-21 PROCEDURE — 85025 COMPLETE CBC W/AUTO DIFF WBC: CPT

## 2019-06-21 PROCEDURE — 99285 EMERGENCY DEPT VISIT HI MDM: CPT | Mod: 25

## 2019-06-21 PROCEDURE — 84484 ASSAY OF TROPONIN QUANT: CPT

## 2019-06-21 PROCEDURE — 80053 COMPREHEN METABOLIC PANEL: CPT

## 2019-06-21 PROCEDURE — 63600175 PHARM REV CODE 636 W HCPCS: Performed by: EMERGENCY MEDICINE

## 2019-06-21 PROCEDURE — 93010 EKG 12-LEAD: ICD-10-PCS | Mod: ,,, | Performed by: INTERNAL MEDICINE

## 2019-06-21 RX ORDER — SODIUM CHLORIDE 0.9 % (FLUSH) 0.9 %
10 SYRINGE (ML) INJECTION
Status: DISCONTINUED | OUTPATIENT
Start: 2019-06-21 | End: 2019-06-24 | Stop reason: HOSPADM

## 2019-06-21 RX ORDER — HYDROMORPHONE HYDROCHLORIDE 1 MG/ML
0.5 INJECTION, SOLUTION INTRAMUSCULAR; INTRAVENOUS; SUBCUTANEOUS EVERY 4 HOURS PRN
Status: CANCELLED | OUTPATIENT
Start: 2019-06-21

## 2019-06-21 RX ORDER — ACETAMINOPHEN 325 MG/1
650 TABLET ORAL EVERY 8 HOURS PRN
Status: CANCELLED | OUTPATIENT
Start: 2019-06-21

## 2019-06-21 RX ORDER — MORPHINE SULFATE 4 MG/ML
4 INJECTION, SOLUTION INTRAMUSCULAR; INTRAVENOUS EVERY 4 HOURS PRN
Status: CANCELLED | OUTPATIENT
Start: 2019-06-21

## 2019-06-21 RX ORDER — GLUCAGON 1 MG
1 KIT INJECTION
Status: DISCONTINUED | OUTPATIENT
Start: 2019-06-21 | End: 2019-06-22

## 2019-06-21 RX ADMIN — DEXTROSE 8 MG/HR: 50 INJECTION, SOLUTION INTRAVENOUS at 11:06

## 2019-06-21 RX ADMIN — PROMETHAZINE HYDROCHLORIDE 12.5 MG: 25 INJECTION INTRAMUSCULAR; INTRAVENOUS at 06:06

## 2019-06-21 RX ADMIN — DEXTROSE 8 MG/HR: 50 INJECTION, SOLUTION INTRAVENOUS at 07:06

## 2019-06-21 NOTE — ED PROVIDER NOTES
Encounter Date: 6/21/2019    SCRIBE #1 NOTE: I, Trish Mendosa, am scribing for, and in the presence of, Dr. Weldon.       History     Chief Complaint   Patient presents with    Coffee Ground Emesis     Pt from [facility] with c/o coffee ground emesis for 24 hr's ( 5 episodes), VSS, CBG 77     Time seen by provider: 4:36 PM    This is a 54 y.o. male who presents with complaint of vomiting dark blood since yesterday. He denies abdominal pain, current nausea, or blood in stool. He was last dialyzed today and received full treatment. He has not followed up with GI since he was hospitalized with intractable vomiting three months ago. He is compliant with Protonix.    The history is provided by the patient.     Review of patient's allergies indicates:   Allergen Reactions    Fosrenol [lanthanum] Nausea And Vomiting     Nausea and vomiting     Past Medical History:   Diagnosis Date    Amputation stump pain 9/10/2013    Aspiration pneumonia 7/27/2015    Asterixis 11/8/2016    C. difficile colitis 8/7/2015    Cholelithiasis without obstruction 8/25/2015    Chronic diastolic heart failure     2-23-17   1 - Low normal to mildly depressed left ventricular systolic function (EF 50-55%).    2 - Right ventricular enlargement with mildly depressed systolic function.    3 - Left ventricular diastolic dysfunction.    4 - Right atrial enlargement.    5 - Severe tricuspid regurgitation.    6 - Pulmonary hypertension. The estimated PA systolic pressure is 86 mmHg.    7 - Increased central venous pressure.    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 OR 1ST FLR    COLONOSCOPY      COLONOSCOPY N/A 4/4/2017    Performed by Walker Stern MD at [facility] ENDO (2ND FLR)    EGD (ESOPHAGOGASTRODUODENOSCOPY) N/A 3/7/2019    Performed by Man Galicia MD at [facility] ENDO (2ND FLR)    EGD (ESOPHAGOGASTRODUODENOSCOPY) N/A 6/12/2018    Performed by Man Galicia MD at [facility] ENDO (2ND FLR)    ESOPHAGOGASTRODUODENOSCOPY (EGD) N/A 5/22/2018    Performed by Ke Sparks MD at [facility] ENDO (2ND FLR)    ESOPHAGOGASTRODUODENOSCOPY (EGD) N/A 3/16/2018    Performed by Kevin De La Paz MD at [facility] ENDO (2ND FLR)    ESOPHAGOGASTRODUODENOSCOPY (EGD) N/A 3/8/2018    Performed by Man Galicia MD at [facility] ENDO (2ND FLR)    ESOPHAGOGASTRODUODENOSCOPY (EGD) N/A 4/4/2017    Performed by Walker Stern MD at [facility] ENDO (2ND FLR)    ESOPHAGOGASTRODUODENOSCOPY (EGD) N/A 10/17/2014    Performed by Man Galicia MD at [facility] ENDO (2ND FLR)    FISTULOGRAM Right 9/18/2014    Performed by Grayson Hubbard MD at [facility] CATH LAB    FOOT AMPUTATION THROUGH METATARSAL      left foot    LEG AMPUTATION THROUGH KNEE  12/18/2013    left BKA    R AVF  9/12/12    UPPER GASTROINTESTINAL ENDOSCOPY       Family History   Problem Relation Age of Onset    Early death Mother     Kidney disease Father     Hypertension Father      Heart disease Father     Hyperlipidemia Father     Diabetes Brother     Alcohol abuse Maternal Grandmother     Diabetes Maternal Grandfather     Hypertension Sister     Melanoma Neg Hx      Social History     Tobacco Use    Smoking status: Former Smoker     Packs/day: 1.00     Years: 10.00     Pack years: 10.00    Smokeless tobacco: Never Used   Substance Use Topics    Alcohol use: No    Drug use: No     Review of Systems   Constitutional: Negative for fever.   HENT: Negative for sore throat.    Respiratory: Negative for shortness of breath.    Cardiovascular: Negative for chest pain.   Gastrointestinal: Positive for vomiting. Negative for abdominal pain, blood in stool and nausea.        Positive for vomiting blood.   Genitourinary: Negative for dysuria.   Musculoskeletal: Negative for back pain.   Skin: Negative for rash.   Neurological: Negative for weakness.   Hematological: Does not bruise/bleed easily.       Physical Exam     Initial Vitals [06/21/19 1620]   BP Pulse Resp Temp SpO2   (!) 143/81 75 18 98.6 °F (37 °C) 99 %      MAP       --         Physical Exam    Nursing note and vitals reviewed.  Constitutional: He appears well-developed and well-nourished. He is not diaphoretic. No distress.   HENT:   Head: Normocephalic and atraumatic.   Eyes: EOM are normal. Pupils are equal, round, and reactive to light.   Neck: Normal range of motion. Neck supple.   No elevated JVD.   Cardiovascular: Normal rate, regular rhythm and normal heart sounds.   Pulmonary/Chest: Breath sounds normal. No respiratory distress.   Abdominal: Soft. There is no tenderness.   Genitourinary:   Genitourinary Comments: Rectal exam refused.   Musculoskeletal: He exhibits no edema.   Prothesis to LLE.   Neurological: He is alert and oriented to person, place, and time.   Skin: Skin is warm and dry.   Palpable thrill to RUE AV fistula.         ED Course   Critical Care  Date/Time: 6/21/2019 9:33 PM  Performed by: Vanessa Weldon  MD  Authorized by: Vanessa Weldon MD   Total critical care time (exclusive of procedural time) : 60 minutes  Critical care was necessary to treat or prevent imminent or life-threatening deterioration of the following conditions: gi bleed.  Critical care was time spent personally by me on the following activities: re-evaluation of patient's condition, ordering and review of radiographic studies, ordering and review of laboratory studies, obtaining history from patient or surrogate, examination of patient and evaluation of patient's response to treatment.        Labs Reviewed   CBC W/ AUTO DIFFERENTIAL - Abnormal; Notable for the following components:       Result Value    RBC 4.10 (*)     Hemoglobin 13.0 (*)     Hematocrit 38.5 (*)     Mean Corpuscular Hemoglobin 31.7 (*)     Eosinophil% 9.3 (*)     All other components within normal limits   COMPREHENSIVE METABOLIC PANEL - Abnormal; Notable for the following components:    CO2 30 (*)     Creatinine 4.9 (*)     Total Protein 9.5 (*)     Alkaline Phosphatase 331 (*)     eGFR if  14 (*)     eGFR if non  12 (*)     All other components within normal limits   TROPONIN I - Abnormal; Notable for the following components:    Troponin I 0.119 (*)     All other components within normal limits   MAGNESIUM   TROPONIN I   PROTIME-INR   TYPE & SCREEN   POCT GLUCOSE   POCT GLUCOSE MONITORING CONTINUOUS     EKG Readings: (Independently Interpreted)   Normal sinus rhythm, heart rate 74, normal axis, narrow QRS, no ischemic changes, unchanged from 3/2019.       Imaging Results    None          Medical Decision Making:   Initial Assessment:   Urgent evaluation of 54-year-old gentleman with chronic upper GI bleed, ESRD on dialysis Monday Wednesday Friday at [facility], hypertension, diabetes and known erosive gastritis per EGD performed on prior admission for similar symptoms of coffee-ground emesis 3/7/19.  Patient reports that he had 2 episodes of  blood-tinged vomitus in the last 24 hr, denies abdominal pain. Here exam patient well-appearing, no abdominal tenderness, but will obtain repeat labs to ensure no acute anemia.     Independently Interpreted Test(s):   I have ordered and independently interpreted EKG Reading(s) - see prior notes  Clinical Tests:   Lab Tests: Ordered and Reviewed  Medical Tests: Ordered and Reviewed  ED Management:  H/h improved from baseline, troponin lower than priors and pt without chest pain though pt had recurrent episode of Blood tinged coffee ground emesis approx 500cc.  Discussed case with GI, encouraged admission for serial CBC and ppi.  Patient admitted, and discussed with Nephrology need for routine dialysis, electrolytes consistent with ESRD without need for acute HD at this time.            Scribe Attestation:   Scribe #1: I performed the above scribed service and the documentation accurately describes the services I performed. I attest to the accuracy of the note.    Attending Attestation:           Physician Attestation for Scribe:  Physician Attestation Statement for Scribe #1: I, Dr. Weldon, reviewed documentation, as scribed by Trish Mendosa in my presence, and it is both accurate and complete.                 ED Course as of Jun 21 1923   Fri Jun 21, 2019   1835 Discussed case with Dr. Tian. Agreeable to admission for serial H&H. Recommends Protonix.    [AC]   1848 Discussed case with [facility] nephrology. Dr. Lopez made aware of patient's need for dialysis.    [AC]   1906 Discussed case with Rocco Purcell NP, and will admit patient to Dr. Zurita.    [AC]      ED Course User Index  [AC] Trish Mendosa     Clinical Impression:     1. UGIB (upper gastrointestinal bleed)    2. Emesis    3. Dialysis patient    4. Chronic upper GI bleeding          Disposition:   Disposition: Admitted  Condition: Serious                        Vanessa Weldon MD  06/21/19 6595

## 2019-06-21 NOTE — ED TRIAGE NOTES
Patient presents to ER via EMS with c/o brown emesis since yesterday.  Denies chest pain, denies nausea and diarrhea.

## 2019-06-22 PROBLEM — K92.2 UGIB (UPPER GASTROINTESTINAL BLEED): Status: ACTIVE | Noted: 2019-06-22

## 2019-06-22 PROBLEM — D69.6 THROMBOCYTOPENIA: Status: ACTIVE | Noted: 2019-06-22

## 2019-06-22 LAB
ALBUMIN SERPL BCP-MCNC: 3.5 G/DL (ref 3.5–5.2)
ALP SERPL-CCNC: 298 U/L (ref 55–135)
ALT SERPL W/O P-5'-P-CCNC: 22 U/L (ref 10–44)
ANION GAP SERPL CALC-SCNC: 14 MMOL/L (ref 8–16)
AST SERPL-CCNC: 26 U/L (ref 10–40)
BASOPHILS # BLD AUTO: 0.02 K/UL (ref 0–0.2)
BASOPHILS NFR BLD: 0.3 % (ref 0–1.9)
BASOPHILS NFR BLD: 0.3 % (ref 0–1.9)
BASOPHILS NFR BLD: 0.4 % (ref 0–1.9)
BILIRUB SERPL-MCNC: 0.5 MG/DL (ref 0.1–1)
BUN SERPL-MCNC: 19 MG/DL (ref 6–20)
CALCIUM SERPL-MCNC: 9.9 MG/DL (ref 8.7–10.5)
CHLORIDE SERPL-SCNC: 101 MMOL/L (ref 95–110)
CHOLEST SERPL-MCNC: 111 MG/DL (ref 120–199)
CHOLEST/HDLC SERPL: 2.8 {RATIO} (ref 2–5)
CO2 SERPL-SCNC: 30 MMOL/L (ref 23–29)
CREAT SERPL-MCNC: 5.9 MG/DL (ref 0.5–1.4)
DIFFERENTIAL METHOD: ABNORMAL
EOSINOPHIL # BLD AUTO: 0.4 K/UL (ref 0–0.5)
EOSINOPHIL # BLD AUTO: 0.4 K/UL (ref 0–0.5)
EOSINOPHIL # BLD AUTO: 0.5 K/UL (ref 0–0.5)
EOSINOPHIL NFR BLD: 6 % (ref 0–8)
EOSINOPHIL NFR BLD: 7.1 % (ref 0–8)
EOSINOPHIL NFR BLD: 8 % (ref 0–8)
ERYTHROCYTE [DISTWIDTH] IN BLOOD BY AUTOMATED COUNT: 13.5 % (ref 11.5–14.5)
ERYTHROCYTE [DISTWIDTH] IN BLOOD BY AUTOMATED COUNT: 13.5 % (ref 11.5–14.5)
ERYTHROCYTE [DISTWIDTH] IN BLOOD BY AUTOMATED COUNT: 13.6 % (ref 11.5–14.5)
EST. GFR  (AFRICAN AMERICAN): 11 ML/MIN/1.73 M^2
EST. GFR  (NON AFRICAN AMERICAN): 10 ML/MIN/1.73 M^2
ESTIMATED AVG GLUCOSE: 88 MG/DL (ref 68–131)
ESTIMATED AVG GLUCOSE: 88 MG/DL (ref 68–131)
GLUCOSE SERPL-MCNC: 74 MG/DL (ref 70–110)
HBA1C MFR BLD HPLC: 4.7 % (ref 4–5.6)
HBA1C MFR BLD HPLC: 4.7 % (ref 4–5.6)
HCT VFR BLD AUTO: 37.2 % (ref 40–54)
HCT VFR BLD AUTO: 37.4 % (ref 40–54)
HCT VFR BLD AUTO: 39 % (ref 40–54)
HDLC SERPL-MCNC: 40 MG/DL (ref 40–75)
HDLC SERPL: 36 % (ref 20–50)
HGB BLD-MCNC: 12.4 G/DL (ref 14–18)
HGB BLD-MCNC: 12.5 G/DL (ref 14–18)
HGB BLD-MCNC: 13.2 G/DL (ref 14–18)
LDLC SERPL CALC-MCNC: 59.2 MG/DL (ref 63–159)
LYMPHOCYTES # BLD AUTO: 1.5 K/UL (ref 1–4.8)
LYMPHOCYTES # BLD AUTO: 1.5 K/UL (ref 1–4.8)
LYMPHOCYTES # BLD AUTO: 1.8 K/UL (ref 1–4.8)
LYMPHOCYTES NFR BLD: 23.8 % (ref 18–48)
LYMPHOCYTES NFR BLD: 26 % (ref 18–48)
LYMPHOCYTES NFR BLD: 33.6 % (ref 18–48)
MAGNESIUM SERPL-MCNC: 2.1 MG/DL (ref 1.6–2.6)
MCH RBC QN AUTO: 31.4 PG (ref 27–31)
MCH RBC QN AUTO: 31.4 PG (ref 27–31)
MCH RBC QN AUTO: 32 PG (ref 27–31)
MCHC RBC AUTO-ENTMCNC: 33.3 G/DL (ref 32–36)
MCHC RBC AUTO-ENTMCNC: 33.4 G/DL (ref 32–36)
MCHC RBC AUTO-ENTMCNC: 33.8 G/DL (ref 32–36)
MCV RBC AUTO: 94 FL (ref 82–98)
MONOCYTES # BLD AUTO: 0.6 K/UL (ref 0.3–1)
MONOCYTES # BLD AUTO: 0.6 K/UL (ref 0.3–1)
MONOCYTES # BLD AUTO: 0.9 K/UL (ref 0.3–1)
MONOCYTES NFR BLD: 11.3 % (ref 4–15)
MONOCYTES NFR BLD: 15.5 % (ref 4–15)
MONOCYTES NFR BLD: 9.8 % (ref 4–15)
NEUTROPHILS # BLD AUTO: 2.5 K/UL (ref 1.8–7.7)
NEUTROPHILS # BLD AUTO: 3 K/UL (ref 1.8–7.7)
NEUTROPHILS # BLD AUTO: 3.6 K/UL (ref 1.8–7.7)
NEUTROPHILS NFR BLD: 47.6 % (ref 38–73)
NEUTROPHILS NFR BLD: 52 % (ref 38–73)
NEUTROPHILS NFR BLD: 57.8 % (ref 38–73)
NONHDLC SERPL-MCNC: 71 MG/DL
PHOSPHATE SERPL-MCNC: 3.7 MG/DL (ref 2.7–4.5)
PLATELET # BLD AUTO: 139 K/UL (ref 150–350)
PLATELET # BLD AUTO: 151 K/UL (ref 150–350)
PLATELET # BLD AUTO: 153 K/UL (ref 150–350)
PMV BLD AUTO: 10.7 FL (ref 9.2–12.9)
PMV BLD AUTO: 11.3 FL (ref 9.2–12.9)
PMV BLD AUTO: 11.4 FL (ref 9.2–12.9)
POCT GLUCOSE: 103 MG/DL (ref 70–110)
POCT GLUCOSE: 144 MG/DL (ref 70–110)
POCT GLUCOSE: 160 MG/DL (ref 70–110)
POCT GLUCOSE: 74 MG/DL (ref 70–110)
POCT GLUCOSE: 87 MG/DL (ref 70–110)
POCT GLUCOSE: 89 MG/DL (ref 70–110)
POCT GLUCOSE: 93 MG/DL (ref 70–110)
POTASSIUM SERPL-SCNC: 3 MMOL/L (ref 3.5–5.1)
PROT SERPL-MCNC: 8.5 G/DL (ref 6–8.4)
RBC # BLD AUTO: 3.95 M/UL (ref 4.6–6.2)
RBC # BLD AUTO: 3.98 M/UL (ref 4.6–6.2)
RBC # BLD AUTO: 4.13 M/UL (ref 4.6–6.2)
SODIUM SERPL-SCNC: 145 MMOL/L (ref 136–145)
TRIGL SERPL-MCNC: 59 MG/DL (ref 30–150)
TROPONIN I SERPL DL<=0.01 NG/ML-MCNC: 0.13 NG/ML (ref 0–0.03)
TROPONIN I SERPL DL<=0.01 NG/ML-MCNC: 0.14 NG/ML (ref 0–0.03)
TROPONIN I SERPL DL<=0.01 NG/ML-MCNC: 0.16 NG/ML (ref 0–0.03)
WBC # BLD AUTO: 5.33 K/UL (ref 3.9–12.7)
WBC # BLD AUTO: 5.8 K/UL (ref 3.9–12.7)
WBC # BLD AUTO: 6.22 K/UL (ref 3.9–12.7)

## 2019-06-22 PROCEDURE — 83735 ASSAY OF MAGNESIUM: CPT

## 2019-06-22 PROCEDURE — 25000003 PHARM REV CODE 250: Performed by: NURSE PRACTITIONER

## 2019-06-22 PROCEDURE — 80100014 HC HEMODIALYSIS 1:1

## 2019-06-22 PROCEDURE — 84100 ASSAY OF PHOSPHORUS: CPT

## 2019-06-22 PROCEDURE — 36415 COLL VENOUS BLD VENIPUNCTURE: CPT

## 2019-06-22 PROCEDURE — 11000001 HC ACUTE MED/SURG PRIVATE ROOM

## 2019-06-22 PROCEDURE — 80053 COMPREHEN METABOLIC PANEL: CPT

## 2019-06-22 PROCEDURE — 84484 ASSAY OF TROPONIN QUANT: CPT

## 2019-06-22 PROCEDURE — 84484 ASSAY OF TROPONIN QUANT: CPT | Mod: 91

## 2019-06-22 PROCEDURE — 99233 PR SUBSEQUENT HOSPITAL CARE,LEVL III: ICD-10-PCS | Mod: ,,, | Performed by: HOSPITALIST

## 2019-06-22 PROCEDURE — 80061 LIPID PANEL: CPT

## 2019-06-22 PROCEDURE — 83036 HEMOGLOBIN GLYCOSYLATED A1C: CPT

## 2019-06-22 PROCEDURE — 25000003 PHARM REV CODE 250: Performed by: HOSPITALIST

## 2019-06-22 PROCEDURE — 63600175 PHARM REV CODE 636 W HCPCS: Performed by: NURSE PRACTITIONER

## 2019-06-22 PROCEDURE — C9113 INJ PANTOPRAZOLE SODIUM, VIA: HCPCS | Performed by: NURSE PRACTITIONER

## 2019-06-22 PROCEDURE — 80074 ACUTE HEPATITIS PANEL: CPT

## 2019-06-22 PROCEDURE — 85025 COMPLETE CBC W/AUTO DIFF WBC: CPT | Mod: 91

## 2019-06-22 PROCEDURE — 99233 SBSQ HOSP IP/OBS HIGH 50: CPT | Mod: ,,, | Performed by: HOSPITALIST

## 2019-06-22 PROCEDURE — 94761 N-INVAS EAR/PLS OXIMETRY MLT: CPT

## 2019-06-22 RX ORDER — SUCRALFATE 1 G/10ML
0.5 SUSPENSION ORAL 2 TIMES DAILY
Status: DISCONTINUED | OUTPATIENT
Start: 2019-06-22 | End: 2019-06-24 | Stop reason: HOSPADM

## 2019-06-22 RX ORDER — DEXTROSE MONOHYDRATE, SODIUM CHLORIDE, AND POTASSIUM CHLORIDE 50; .745; 4.5 G/1000ML; G/1000ML; G/1000ML
INJECTION, SOLUTION INTRAVENOUS
Status: DISCONTINUED | OUTPATIENT
Start: 2019-06-22 | End: 2019-06-22

## 2019-06-22 RX ORDER — SODIUM CHLORIDE 9 MG/ML
INJECTION, SOLUTION INTRAVENOUS
Status: DISCONTINUED | OUTPATIENT
Start: 2019-06-22 | End: 2019-06-24 | Stop reason: HOSPADM

## 2019-06-22 RX ORDER — ONDANSETRON 4 MG/1
4 TABLET, FILM COATED ORAL ONCE
Status: DISCONTINUED | OUTPATIENT
Start: 2019-06-22 | End: 2019-06-24 | Stop reason: HOSPADM

## 2019-06-22 RX ORDER — ONDANSETRON 4 MG/1
4 TABLET, ORALLY DISINTEGRATING ORAL EVERY 8 HOURS PRN
Status: DISCONTINUED | OUTPATIENT
Start: 2019-06-22 | End: 2019-06-24 | Stop reason: HOSPADM

## 2019-06-22 RX ORDER — CARVEDILOL 12.5 MG/1
25 TABLET ORAL 2 TIMES DAILY WITH MEALS
Status: DISCONTINUED | OUTPATIENT
Start: 2019-06-22 | End: 2019-06-24 | Stop reason: HOSPADM

## 2019-06-22 RX ORDER — PANTOPRAZOLE SODIUM 40 MG/1
40 TABLET, DELAYED RELEASE ORAL 2 TIMES DAILY
Status: DISCONTINUED | OUTPATIENT
Start: 2019-06-22 | End: 2019-06-24 | Stop reason: HOSPADM

## 2019-06-22 RX ORDER — AMLODIPINE BESYLATE 5 MG/1
10 TABLET ORAL DAILY
Status: DISCONTINUED | OUTPATIENT
Start: 2019-06-22 | End: 2019-06-24

## 2019-06-22 RX ORDER — POTASSIUM CHLORIDE 7.45 MG/ML
10 INJECTION INTRAVENOUS
Status: COMPLETED | OUTPATIENT
Start: 2019-06-22 | End: 2019-06-22

## 2019-06-22 RX ORDER — SEVELAMER CARBONATE 800 MG/1
1600 TABLET, FILM COATED ORAL
Status: DISCONTINUED | OUTPATIENT
Start: 2019-06-22 | End: 2019-06-24 | Stop reason: HOSPADM

## 2019-06-22 RX ORDER — MUPIROCIN 20 MG/G
OINTMENT TOPICAL 2 TIMES DAILY
Status: DISCONTINUED | OUTPATIENT
Start: 2019-06-22 | End: 2019-06-24 | Stop reason: HOSPADM

## 2019-06-22 RX ORDER — SODIUM CHLORIDE 9 MG/ML
INJECTION, SOLUTION INTRAVENOUS ONCE
Status: DISCONTINUED | OUTPATIENT
Start: 2019-06-22 | End: 2019-06-22

## 2019-06-22 RX ADMIN — PANTOPRAZOLE SODIUM 40 MG: 40 TABLET, DELAYED RELEASE ORAL at 03:06

## 2019-06-22 RX ADMIN — POTASSIUM CHLORIDE 10 MEQ: 10 INJECTION, SOLUTION INTRAVENOUS at 06:06

## 2019-06-22 RX ADMIN — PANTOPRAZOLE SODIUM 40 MG: 40 TABLET, DELAYED RELEASE ORAL at 08:06

## 2019-06-22 RX ADMIN — NEPHROCAP 1 CAPSULE: 1 CAP ORAL at 03:06

## 2019-06-22 RX ADMIN — SUCRALFATE 0.5 G: 1 SUSPENSION ORAL at 03:06

## 2019-06-22 RX ADMIN — MUPIROCIN: 20 OINTMENT TOPICAL at 08:06

## 2019-06-22 RX ADMIN — SUCRALFATE 0.5 G: 1 SUSPENSION ORAL at 08:06

## 2019-06-22 RX ADMIN — MUPIROCIN: 20 OINTMENT TOPICAL at 03:06

## 2019-06-22 RX ADMIN — POTASSIUM CHLORIDE 10 MEQ: 10 INJECTION, SOLUTION INTRAVENOUS at 05:06

## 2019-06-22 RX ADMIN — SEVELAMER CARBONATE 1600 MG: 800 TABLET, FILM COATED ORAL at 03:06

## 2019-06-22 RX ADMIN — DEXTROSE 8 MG/HR: 50 INJECTION, SOLUTION INTRAVENOUS at 04:06

## 2019-06-22 NOTE — PROGRESS NOTES
[facility] Hospital Medicine  Progress Note    Patient Name: Vaughn Retana  MRN: [id number]  Patient Class: IP- Inpatient   Admission Date: 6/21/2019  Length of Stay: 1 days  Attending Physician: Lucho Whitfield MD  Primary Care Provider: Yvette Zelaya NP        Subjective:     Principal Problem:Chronic upper GI bleeding      HPI:  The patient is a 54 y.o. male who presents with complaint of vomiting dark blood since yesterday. He denies abdominal pain, current nausea, or blood in stool. He was last dialyzed today and received full treatment. He has not followed up with GI since he was hospitalized with intractable vomiting three months ago. He is compliant with Protonix.  While in ER, patient had multiple large coffee ground emesis.      Overview/Hospital Course:  No notes on file    Interval History: No acute events overnight.  No further nausea or vomiting.  States he is very hungry.    Review of Systems   Constitutional: Negative for activity change and appetite change.   HENT: Negative for congestion and dental problem.    Eyes: Negative for discharge and itching.   Respiratory: Negative for apnea and chest tightness.    Cardiovascular: Negative for chest pain and leg swelling.   Gastrointestinal: Negative for abdominal distention and abdominal pain.   Genitourinary: Negative for difficulty urinating and dysuria.   Musculoskeletal: Negative for arthralgias and back pain.   Neurological: Negative for dizziness and facial asymmetry.   Psychiatric/Behavioral: Negative for agitation.     Objective:     Vital Signs (Most Recent):  Temp: 97.8 °F (36.6 °C) (06/22/19 0800)  Pulse: 93 (06/22/19 1200)  Resp: 10 (06/22/19 0800)  BP: 117/73 (06/22/19 1200)  SpO2: 100 % (06/22/19 0800) Vital Signs (24h Range):  Temp:  [97.8 °F (36.6 °C)-98.6 °F (37 °C)] 97.8 °F (36.6 °C)  Pulse:  [68-93] 93  Resp:  [8-21] 10  SpO2:  [95 %-100 %] 100 %  BP: (103-165)/(68-89) 117/73     Weight: 63.8 kg (140 lb  10.5 oz)  Body mass index is 22.7 kg/m².    Intake/Output Summary (Last 24 hours) at 6/22/2019 1248  Last data filed at 6/22/2019 1112  Gross per 24 hour   Intake 813.67 ml   Output --   Net 813.67 ml      Physical Exam   Constitutional: He is oriented to person, place, and time. He appears well-developed and well-nourished.   HENT:   Head: Normocephalic and atraumatic.   Eyes: Pupils are equal, round, and reactive to light. EOM are normal.   Neck: Normal range of motion. Neck supple.   Cardiovascular: Normal rate, regular rhythm and normal heart sounds.   Pulmonary/Chest: Effort normal and breath sounds normal. No respiratory distress.   Abdominal: Soft. Bowel sounds are normal. He exhibits no distension. There is no tenderness.   Musculoskeletal: Normal range of motion. He exhibits no edema.   Left bka is c/d/i.   Neurological: He is oriented to person, place, and time. No cranial nerve deficit. Coordination normal.   Skin: Skin is warm and dry.   Arm fistula with palpable thrill   Psychiatric: He has a normal mood and affect. His behavior is normal.   Vitals reviewed.      Significant Labs: All pertinent labs within the past 24 hours have been reviewed.    Significant Imaging: I have reviewed and interpreted all pertinent imaging results/findings within the past 24 hours.      Assessment/Plan:      * Chronic upper GI bleeding  -Mr. Retana was admitted to inpatient status  -He had bloody emesis which has occurred on prior occasions as well.  -Recent EGD with erosive gastritis, chronic bleed.   -Baseline Hb 9-12.  Admit H/H above this and it is still within this range this morning  -No further episodes  -Seen by Dr. Tian with GI and we have discussed the case.  -No endoscopy at this time.  -Continue h/h q12 to ensure stability  -Continue bid ppi  -Advance diet.  -Transfer to the floor.    Thrombocytopenia  -Platelets normal on admit and mildly low today  -Could be due to consumption from  bleeding/clotting  -Repeat labs in AM.      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, without long-term current use of insulin  -A1c 4.7  -Accu checks qac and hs   -Diet controlled at home  -Diabetic diet.          Renovascular hypertension  -Resume home coreg and norvasc    Hypokalemia  -Gently replace today  -Repeat labs in AM.      ESRD on hemodialysis  -At home is on dialysis MWF dialysis patient  -Consult [facility]  -Receiving dialysis today.      Dyslipidemia  -Continue statin        VTE Risk Mitigation (From admission, onward)        Ordered     Place MEL hose  Until discontinued      06/21/19 2313     IP VTE HIGH RISK PATIENT  Once      06/21/19 2313     Reason for No Pharmacological VTE Prophylaxis  Once      06/21/19 2313     Place sequential compression device  Until discontinued      06/21/19 2313                Lucho Whitfield MD  Department of Hospital Medicine   [facility], [place], operated by [facility]

## 2019-06-22 NOTE — PROGRESS NOTES
Pt had 1 episode of hypotension during dialysis. Pt placed in reverse trendelenburg and given IVF bolus per dialysis RN. Will monitor.

## 2019-06-22 NOTE — CONSULTS
NEPHROLOGY CONSULT NOTE    HPI & INTERVAL HISTORY: The patient is a 55 y/o male who presented to the ER with complaint of vomiting dark red blood since the day before. He was noted to have coffee ground emesis in the ER as well. He has significant hx of ESRD and was last dialyzed yesterday. Currently he denies any CP, SOB, N/V, C/F.    Past Medical History:   Diagnosis Date    Amputation stump pain 9/10/2013    Aspiration pneumonia 7/27/2015    Asterixis 11/8/2016    C. difficile colitis 8/7/2015    Cholelithiasis without obstruction 8/25/2015    Chronic diastolic heart failure     2-23-17   1 - Low normal to mildly depressed left ventricular systolic function (EF 50-55%).    2 - Right ventricular enlargement with mildly depressed systolic function.    3 - Left ventricular diastolic dysfunction.    4 - Right atrial enlargement.    5 - Severe tricuspid regurgitation.    6 - Pulmonary hypertension. The estimated PA systolic pressure is 86 mmHg.    7 - Increased central venous pressure.     Chronic low back pain 12/1/2015    Closed head injury 9/8/2016    ESRD on hemodialysis 2/7/2013    MWF at 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 OR 1ST FLR    COLONOSCOPY      COLONOSCOPY N/A 4/4/2017    Performed by Walker Stern MD at [facility] ENDO (2ND FLR)    EGD (ESOPHAGOGASTRODUODENOSCOPY) N/A 3/7/2019    Performed by Man Galicia MD at [facility] ENDO (2ND FLR)    EGD (ESOPHAGOGASTRODUODENOSCOPY) N/A 6/12/2018    Performed by Man Galicia MD at [facility] ENDO (2ND FLR)    ESOPHAGOGASTRODUODENOSCOPY (EGD) N/A 5/22/2018    Performed by Ke Sparks MD at [facility] ENDO (2ND FLR)    ESOPHAGOGASTRODUODENOSCOPY (EGD) N/A 3/16/2018    Performed by Kevin De La Paz MD at [facility] ENDO (2ND FLR)    ESOPHAGOGASTRODUODENOSCOPY (EGD) N/A 3/8/2018    Performed by Man Galicia MD at [facility] ENDO (2ND FLR)    ESOPHAGOGASTRODUODENOSCOPY (EGD) N/A 4/4/2017    Performed by Walker Stern MD at [facility] ENDO (2ND FLR)    ESOPHAGOGASTRODUODENOSCOPY (EGD) N/A 10/17/2014    Performed by Man Galicia MD at [facility] ENDO (2ND FLR)    FISTULOGRAM Right 9/18/2014    Performed by Grayson Hubbard MD at [facility] CATH LAB    FOOT AMPUTATION THROUGH METATARSAL      left foot    LEG AMPUTATION THROUGH KNEE  12/18/2013    left BKA    R AVF  9/12/12    UPPER GASTROINTESTINAL ENDOSCOPY        Review of patient's allergies indicates:   Allergen Reactions    Fosrenol [lanthanum] Nausea And Vomiting     Nausea and vomiting      Medications Prior to Admission   Medication Sig Dispense Refill Last Dose    amLODIPine (NORVASC) 10 MG tablet Take 1 tablet (10 mg total) by mouth once daily. 90 tablet 1 Unknown at Unknown time    atorvastatin (LIPITOR) 80 MG tablet Take 1 tablet (80 mg total) by mouth every evening. 90 tablet 1 Unknown at Unknown time    carvedilol (COREG) 25 MG tablet Take 1 tablet (25 mg total) by mouth 2 (two) times daily with meals. 180 tablet 1 Unknown at Unknown time    ondansetron (ZOFRAN) 8 MG tablet Take 1 tablet (8  mg total) by mouth every 8 (eight) hours as needed for Nausea. 40 tablet 0 Unknown at Unknown time    pantoprazole (PROTONIX) 40 MG tablet Take 1 tablet (40 mg total) by mouth 2 (two) times daily before meals. 60 tablet 11     RENAPLEX-D 800 mcg-12.5 mg -2,000 unit Tab Take 1 tablet by mouth once daily.  3 Unknown at Unknown time    sevelamer carbonate (RENVELA) 800 mg Tab TAKE 2 TABLETS BY MOUTH THREE TIMES A DAY WITH MEALS  6 Unknown at Unknown time    sucralfate (CARAFATE) 100 mg/mL suspension Take 5 mLs (500 mg total) by mouth 2 (two) times daily. 1 Bottle 3     dicyclomine (BENTYL) 10 MG capsule Take 1 capsule (10 mg total) by mouth before meals as needed (TID PRN). For belching 90 capsule 0 Unknown at Unknown time       Social History     Socioeconomic History    Marital status:      Spouse name: Not on file    Number of children: Not on file    Years of education: Not on file    Highest education level: Not on file   Occupational History    Not on file   Social Needs    Financial resource strain: Not on file    Food insecurity:     Worry: Not on file     Inability: Not on file    Transportation needs:     Medical: Not on file     Non-medical: Not on file   Tobacco Use    Smoking status: Former Smoker     Packs/day: 1.00     Years: 10.00     Pack years: 10.00    Smokeless tobacco: Never Used   Substance and Sexual Activity    Alcohol use: No    Drug use: No    Sexual activity: Yes     Partners: Female     Birth control/protection: None   Lifestyle    Physical activity:     Days per week: Not on file     Minutes per session: Not on file    Stress: Not on file   Relationships    Social connections:     Talks on phone: Not on file     Gets together: Not on file     Attends [employer] service: Not on file     Active member of club or organization: Not on file     Attends meetings of clubs or organizations: Not on file     Relationship status: Not on file   Other Topics Concern    Not  on file   Social History Narrative    Not on file        MEDS   sodium chloride 0.9%   Intravenous Once    amLODIPine  10 mg Oral Daily    carvedilol  25 mg Oral BID WM    mupirocin   Nasal BID    pantoprazole  40 mg Oral BID    sevelamer carbonate  1,600 mg Oral TID WM    sucralfate  0.5 g Oral BID    vitamin renal formula (B-complex-vitamin c-folic acid)  1 capsule Oral Daily        ROS:     GENERAL:  No change in appetite, + loss of energy, no weight loss.  No fever, chills or sweats.   HEENT: No auditory or visual complaints. No tinnitus.  No pharyngeal complaints  RESPIRATORY:  No cough, SOB.      CARDIOVASCULAR:  No chest pain or pressure.  No palpitations   GASTROINTESTINAL:  No dysphagia, odynophagia, heartburn, diarrhea or constipation.  Positive for nausea or vomiting.  MUSCULOSKELETAL: Positive for myalgias, arthralgias,  No joint swelling.    NEUROLOGICAL:  No vertigo or disturbance of balance or coordination.  No motor or sensory complaints. No headache.  GENITOURINARY:  No dysuria, urgency or urinary frequency.  No incontinence.     DERMATOLOGIC:  No complaint of skin lesions or rashes.  No change        CONTINOUS INFUSIONS:      Intake/Output Summary (Last 24 hours) at 6/22/2019 1151  Last data filed at 6/22/2019 0635  Gross per 24 hour   Intake 250 ml   Output --   Net 250 ml        HEMODYNAMICS:    Temp:  [97.8 °F (36.6 °C)-98.6 °F (37 °C)] 97.8 °F (36.6 °C)  Pulse:  [68-88] 68  Resp:  [8-21] 10  SpO2:  [95 %-100 %] 100 %  BP: (103-165)/(68-89) 123/77   Gen:  No acute distress.  HEENT:  Atraumatic, normocephalic.  Anicteric, PERRL, EOMI, throat clear  Cards: RRR, no murmurs, rubs, or gallops  Pul: Normal effort; clear to auscultation bilaterally  Abd: Soft, generalized tenderness noted, nondistened. No rebound tenderness or guarding  Ext: (L) BKA, No clubbing, cyanosis, or edema, positive pulse  CN II-XII intact, no asterixis  Skin: Warm and dry, No rash or petechiae  Dialysis Access:  (R)  AVF    LABS   Lab Results   Component Value Date    WBC 5.33 06/22/2019    HGB 12.4 (L) 06/22/2019    HCT 37.2 (L) 06/22/2019    MCV 94 06/22/2019     (L) 06/22/2019        Recent Labs   Lab 06/22/19  0332   GLU 74   CALCIUM 9.9   ALBUMIN 3.5   PROT 8.5*      K 3.0*   CO2 30*      BUN 19   CREATININE 5.9*   ALKPHOS 298*   ALT 22   AST 26   BILITOT 0.5      Lab Results   Component Value Date    .0 (H) 04/04/2018    CALCIUM 9.9 06/22/2019    CAION 0.75 (L) 04/03/2018    PHOS 3.7 06/22/2019      Lab Results   Component Value Date    IRON 84 03/06/2019    TIBC 250 03/06/2019    FERRITIN 1,304 (H) 03/06/2019        ABG  No results for input(s): PH, PO2, PCO2, HCO3, BE in the last 168 hours.      IMAGING:  CXR    ASSESSMENT  Patient Active Problem List   Diagnosis    Anemia in chronic kidney disease    Secondary hyperparathyroidism of renal origin    Dyslipidemia    ESRD on hemodialysis    Peripheral vascular disease in diabetes mellitus    History of left below knee amputation 12/18/13    Singultus    History of Intracerebral Hemorrhage: L BG 5/2013; R BG 9/2016; R BG 11/2016; L caudate head 2/2017    Secondary pulmonary hypertension    DVT (deep venous thrombosis)    Chronic kidney disease-mineral and bone disorder    Hypokalemia    Renovascular hypertension    Chronic upper GI bleeding    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, without long-term current use of insulin    Normocytic anemia    History of GI bleed    Erosive gastritis    Dialysis patient    Dehydration    Hemoptysis    Hypertensive urgency    Noncompliance    Severe malnutrition    History of TB (tuberculosis)    Weakness    Intractable vomiting with nausea    UGIB (upper gastrointestinal bleed)    Thrombocytopenia        PLAN    1. ESRD - Plan HD today as ordered. Avoid NSAIDs, other nephrotoxic drugs or procedures. Monitor chemistries (CMP, Mg, Phos, CBC) daily. Place on renal  diet. Renal vitamins.    2. GI Bleed - GI on board, monitor H/H, On PPI.    3. Hypertension - Continue meds/UF as ordered.    4. Secondary hyperparathyroidism - Plan for vitamin D analog with HD.     5. Hypokalemia - May need to supplement but should monitor closely since he has GIB.

## 2019-06-22 NOTE — ASSESSMENT & PLAN NOTE
H/H- 13/38.5, baseline appears 9-12/27-36.  Recent EGD with erosive gastritis, chronic bleed. Believe this is more chronic than acute bleeding. Trend CBC and monitor for more high volume bloody emesis    CBC Q6  Consult GI  Protonix gtt

## 2019-06-22 NOTE — SUBJECTIVE & OBJECTIVE
Past Medical History:   Diagnosis Date    Amputation stump pain 9/10/2013    Aspiration pneumonia 7/27/2015    Asterixis 11/8/2016    C. difficile colitis 8/7/2015    Cholelithiasis without obstruction 8/25/2015    Chronic diastolic heart failure     2-23-17   1 - Low normal to mildly depressed left ventricular systolic function (EF 50-55%).    2 - Right ventricular enlargement with mildly depressed systolic function.    3 - Left ventricular diastolic dysfunction.    4 - Right atrial enlargement.    5 - Severe tricuspid regurgitation.    6 - Pulmonary hypertension. The estimated PA systolic pressure is 86 mmHg.    7 - Increased central venous pressure.    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    Metabolic acidosis, IAG, reduced excretion of inorganic acids     Myoclonic jerking 9/20/2016    Noncompliance with medication regimen 12/4/2018    Secondary hyperparathyroidism (of renal origin)     Secondary pulmonary hypertension 3/23/2017    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    TB lung, latent 08/25/2015    Negative Quantiferon Gold 3-23-17       Past Surgical History:   Procedure Laterality Date    AMPUTATION, BELOW KNEE Left 12/18/2013    Performed by Elgin Houston MD at [facility] OR 1ST FLR    COLONOSCOPY      COLONOSCOPY N/A 4/4/2017    Performed by Walker Stern MD at [facility] ENDO (2ND FLR)    EGD (ESOPHAGOGASTRODUODENOSCOPY) N/A 3/7/2019    Performed by  Man Galicia MD at [facility] ENDO (2ND FLR)    EGD (ESOPHAGOGASTRODUODENOSCOPY) N/A 6/12/2018    Performed by Man Galicia MD at [facility] ENDO (2ND FLR)    ESOPHAGOGASTRODUODENOSCOPY (EGD) N/A 5/22/2018    Performed by Ke Sparks MD at [facility] ENDO (2ND FLR)    ESOPHAGOGASTRODUODENOSCOPY (EGD) N/A 3/16/2018    Performed by Kevin De La Paz MD at [facility] ENDO (2ND FLR)    ESOPHAGOGASTRODUODENOSCOPY (EGD) N/A 3/8/2018    Performed by Man Galicia MD at [facility] ENDO (2ND FLR)    ESOPHAGOGASTRODUODENOSCOPY (EGD) N/A 4/4/2017    Performed by Walker Stern MD at [facility] ENDO (2ND FLR)    ESOPHAGOGASTRODUODENOSCOPY (EGD) N/A 10/17/2014    Performed by Man Galicia [facility] at [facility] (2ND FLR)    FISTULOGRAM Right 9/18/2014    Performed by Grayson Hubbard MD at [facility] CATH LAB    FOOT AMPUTATION THROUGH METATARSAL      left foot    LEG AMPUTATION THROUGH KNEE  12/18/2013    left BKA    R AVF  9/12/12    UPPER GASTROINTESTINAL ENDOSCOPY         Review of patient's allergies indicates:   Allergen Reactions    Fosrenol [lanthanum] Nausea And Vomiting     Nausea and vomiting       No current facility-administered medications on file prior to encounter.      Current Outpatient Medications on File Prior to Encounter   Medication Sig    amLODIPine (NORVASC) 10 MG tablet Take 1 tablet (10 mg total) by mouth once daily.    atorvastatin (LIPITOR) 80 MG tablet Take 1 tablet (80 mg total) by mouth every evening.    carvedilol (COREG) 25 MG tablet Take 1 tablet (25 mg total) by mouth 2 (two) times daily with meals.    ondansetron (ZOFRAN) 8 MG tablet Take 1 tablet (8 mg total) by mouth every 8 (eight) hours as needed for Nausea.    pantoprazole (PROTONIX) 40 MG tablet Take 1 tablet (40 mg total) by mouth 2 (two) times daily before meals.    RENAPLEX-D 800 mcg-12.5 mg -2,000 unit Tab Take 1 tablet by mouth once daily.    sevelamer carbonate (RENVELA) 800 mg Tab TAKE 2 TABLETS BY MOUTH THREE TIMES A DAY WITH MEALS     sucralfate (CARAFATE) 100 mg/mL suspension Take 5 mLs (500 mg total) by mouth 2 (two) times daily.    dicyclomine (BENTYL) 10 MG capsule Take 1 capsule (10 mg total) by mouth before meals as needed (TID PRN). For belching     Family History     Problem Relation (Age of Onset)    Alcohol abuse Maternal Grandmother    Diabetes Brother, Maternal Grandfather    Early death Mother    Heart disease Father    Hyperlipidemia Father    Hypertension Father, Sister    Kidney disease Father        Tobacco Use    Smoking status: Former Smoker     Packs/day: 1.00     Years: 10.00     Pack years: 10.00    Smokeless tobacco: Never Used   Substance and Sexual Activity    Alcohol use: No    Drug use: No    Sexual activity: Yes     Partners: Female     Birth control/protection: None     Review of Systems   Constitutional: Positive for activity change and fatigue. Negative for appetite change and fever.   HENT: Negative for congestion, ear pain and postnasal drip.    Eyes: Negative for discharge.   Respiratory: Negative for apnea, shortness of breath and wheezing.    Cardiovascular: Negative for chest pain and leg swelling.   Gastrointestinal: Positive for nausea and vomiting (bloody). Negative for abdominal distention and abdominal pain.   Endocrine: Negative for polydipsia, polyphagia and polyuria.   Genitourinary: Negative for difficulty urinating, flank pain, frequency, hematuria and urgency.   Musculoskeletal: Positive for arthralgias and myalgias. Negative for joint swelling.   Skin: Negative for pallor and rash.   Allergic/Immunologic: Negative for environmental allergies and food allergies.   Neurological: Negative for dizziness, speech difficulty, weakness, light-headedness and headaches.   Hematological: Does not bruise/bleed easily.   Psychiatric/Behavioral: Negative for agitation.     Objective:     Vital Signs (Most Recent):  Temp: 98.2 °F (36.8 °C) (06/21/19 2330)  Pulse: 78 (06/21/19 2350)  Resp: 15 (06/21/19  2350)  BP: (!) 162/85 (06/21/19 2350)  SpO2: 97 % (06/21/19 2350) Vital Signs (24h Range):  Temp:  [98.2 °F (36.8 °C)-98.6 °F (37 °C)] 98.2 °F (36.8 °C)  Pulse:  [75-88] 78  Resp:  [12-21] 15  SpO2:  [95 %-99 %] 97 %  BP: (108-162)/(68-86) 162/85     Weight: 70.3 kg (155 lb)  Body mass index is 25.02 kg/m².    Physical Exam   Constitutional: He appears well-developed. He appears lethargic.   HENT:   Head: Normocephalic.   Eyes: Conjunctivae are normal.   Neck: Normal range of motion. Neck supple.   Cardiovascular: Normal rate and intact distal pulses.   Pulmonary/Chest: Effort normal. He has decreased breath sounds in the right lower field and the left lower field.   Abdominal: Soft. He exhibits no distension. Bowel sounds are increased. There is generalized tenderness.   Musculoskeletal: Normal range of motion.   Neurological: He has normal strength. He appears lethargic. GCS eye subscore is 4. GCS verbal subscore is 5. GCS motor subscore is 6.   Skin: Skin is warm and dry.   Psychiatric: He has a normal mood and affect. His speech is normal and behavior is normal.           Significant Labs:   CBC:   Recent Labs   Lab 06/21/19  1727   WBC 5.59   HGB 13.0*   HCT 38.5*        CMP:   Recent Labs   Lab 06/21/19  1727      K 4.1      CO2 30*      BUN 13   CREATININE 4.9*   CALCIUM 10.1   PROT 9.5*   ALBUMIN 3.8   BILITOT 0.5   ALKPHOS 331*   AST 30   ALT 28   ANIONGAP 12   EGFRNONAA 12*       Significant Imaging: I have reviewed all pertinent imaging results/findings within the past 24 hours.

## 2019-06-22 NOTE — H&P
[facility] Hospital Medicine  History & Physical    Patient Name: Vaughn Retana  MRN: [id number]  Admission Date: 6/21/2019  Attending Physician: Lucho Whitfield MD   Primary Care Provider: Yvette Zelaya NP         Patient information was obtained from patient, past medical records and ER records.     Subjective:     Principal Problem:Chronic upper GI bleeding    Chief Complaint:   Chief Complaint   Patient presents with    Coffee Ground Emesis     Pt from [facility] with c/o coffee ground emesis for 24 hr's ( 5 episodes), VSS, CBG 77        HPI: The patient is a 54 y.o. male who presents with complaint of vomiting dark blood since yesterday. He denies abdominal pain, current nausea, or blood in stool. He was last dialyzed today and received full treatment. He has not followed up with GI since he was hospitalized with intractable vomiting three months ago. He is compliant with Protonix.  While in ER, patient had multiple large coffee ground emesis.      Past Medical History:   Diagnosis Date    Amputation stump pain 9/10/2013    Aspiration pneumonia 7/27/2015    Asterixis 11/8/2016    C. difficile colitis 8/7/2015    Cholelithiasis without obstruction 8/25/2015    Chronic diastolic heart failure     2-23-17   1 - Low normal to mildly depressed left ventricular systolic function (EF 50-55%).    2 - Right ventricular enlargement with mildly depressed systolic function.    3 - Left ventricular diastolic dysfunction.    4 - Right atrial enlargement.    5 - Severe tricuspid regurgitation.    6 - Pulmonary hypertension. The estimated PA systolic pressure is 86 mmHg.    7 - Increased central venous pressure.    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 8/1/2015    Metabolic acidosis, IAG, reduced excretion of inorganic acids     Myoclonic jerking 9/20/2016    Noncompliance with medication regimen 12/4/2018    Secondary hyperparathyroidism (of renal origin)     Secondary pulmonary hypertension 3/23/2017    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    TB lung, latent 08/25/2015    Negative Quantiferon Gold 3-23-17       Past Surgical History:   Procedure Laterality Date    AMPUTATION, BELOW KNEE Left 12/18/2013    Performed by Elgin Houston MD at [facility] OR 1ST FLR    COLONOSCOPY      COLONOSCOPY N/A 4/4/2017    Performed by Walker Stern MD at [facility] ENDO (2ND FLR)    EGD (ESOPHAGOGASTRODUODENOSCOPY) N/A 3/7/2019    Performed by Man Galicia MD at [facility] ENDO (2ND FLR)    EGD (ESOPHAGOGASTRODUODENOSCOPY) N/A 6/12/2018    Performed by Man Galicia MD at [facility] ENDO (2ND FLR)    ESOPHAGOGASTRODUODENOSCOPY (EGD) N/A 5/22/2018    Performed by Ke Sparks MD at [facility] ENDO (2ND FLR)    ESOPHAGOGASTRODUODENOSCOPY (EGD) N/A 3/16/2018    Performed by Kevin De La Paz MD at [facility] ENDO (2ND FLR)    ESOPHAGOGASTRODUODENOSCOPY (EGD) N/A 3/8/2018    Performed by Man Galicia MD at [facility] ENDO (2ND FLR)    ESOPHAGOGASTRODUODENOSCOPY (EGD) N/A 4/4/2017    Performed by Walker Stern MD at [facility] ENDO (2ND FLR)    ESOPHAGOGASTRODUODENOSCOPY (EGD) N/A 10/17/2014    Performed by Man Galicia MD at [facility] ENDO (2ND FLR)    FISTULOGRAM Right 9/18/2014    Performed by Grayson Hubbard MD at [facility] CATH LAB    FOOT AMPUTATION THROUGH METATARSAL      left foot    LEG AMPUTATION THROUGH KNEE  12/18/2013    left BKA    R AVF  9/12/12    UPPER GASTROINTESTINAL  ENDOSCOPY         Review of patient's allergies indicates:   Allergen Reactions    Fosrenol [lanthanum] Nausea And Vomiting     Nausea and vomiting       No current facility-administered medications on file prior to encounter.      Current Outpatient Medications on File Prior to Encounter   Medication Sig    amLODIPine (NORVASC) 10 MG tablet Take 1 tablet (10 mg total) by mouth once daily.    atorvastatin (LIPITOR) 80 MG tablet Take 1 tablet (80 mg total) by mouth every evening.    carvedilol (COREG) 25 MG tablet Take 1 tablet (25 mg total) by mouth 2 (two) times daily with meals.    ondansetron (ZOFRAN) 8 MG tablet Take 1 tablet (8 mg total) by mouth every 8 (eight) hours as needed for Nausea.    pantoprazole (PROTONIX) 40 MG tablet Take 1 tablet (40 mg total) by mouth 2 (two) times daily before meals.    RENAPLEX-D 800 mcg-12.5 mg -2,000 unit Tab Take 1 tablet by mouth once daily.    sevelamer carbonate (RENVELA) 800 mg Tab TAKE 2 TABLETS BY MOUTH THREE TIMES A DAY WITH MEALS    sucralfate (CARAFATE) 100 mg/mL suspension Take 5 mLs (500 mg total) by mouth 2 (two) times daily.    dicyclomine (BENTYL) 10 MG capsule Take 1 capsule (10 mg total) by mouth before meals as needed (TID PRN). For belching     Family History     Problem Relation (Age of Onset)    Alcohol abuse Maternal Grandmother    Diabetes Brother, Maternal Grandfather    Early death Mother    Heart disease Father    Hyperlipidemia Father    Hypertension Father, Sister    Kidney disease Father        Tobacco Use    Smoking status: Former Smoker     Packs/day: 1.00     Years: 10.00     Pack years: 10.00    Smokeless tobacco: Never Used   Substance and Sexual Activity    Alcohol use: No    Drug use: No    Sexual activity: Yes     Partners: Female     Birth control/protection: None     Review of Systems   Constitutional: Positive for activity change and fatigue. Negative for appetite change and fever.   HENT: Negative for congestion, ear pain  and postnasal drip.    Eyes: Negative for discharge.   Respiratory: Negative for apnea, shortness of breath and wheezing.    Cardiovascular: Negative for chest pain and leg swelling.   Gastrointestinal: Positive for nausea and vomiting (bloody). Negative for abdominal distention and abdominal pain.   Endocrine: Negative for polydipsia, polyphagia and polyuria.   Genitourinary: Negative for difficulty urinating, flank pain, frequency, hematuria and urgency.   Musculoskeletal: Positive for arthralgias and myalgias. Negative for joint swelling.   Skin: Negative for pallor and rash.   Allergic/Immunologic: Negative for environmental allergies and food allergies.   Neurological: Negative for dizziness, speech difficulty, weakness, light-headedness and headaches.   Hematological: Does not bruise/bleed easily.   Psychiatric/Behavioral: Negative for agitation.     Objective:     Vital Signs (Most Recent):  Temp: 98.2 °F (36.8 °C) (06/21/19 2330)  Pulse: 78 (06/21/19 2350)  Resp: 15 (06/21/19 2350)  BP: (!) 162/85 (06/21/19 2350)  SpO2: 97 % (06/21/19 2350) Vital Signs (24h Range):  Temp:  [98.2 °F (36.8 °C)-98.6 °F (37 °C)] 98.2 °F (36.8 °C)  Pulse:  [75-88] 78  Resp:  [12-21] 15  SpO2:  [95 %-99 %] 97 %  BP: (108-162)/(68-86) 162/85     Weight: 70.3 kg (155 lb)  Body mass index is 25.02 kg/m².    Physical Exam   Constitutional: He appears well-developed. He appears lethargic.   HENT:   Head: Normocephalic.   Eyes: Conjunctivae are normal.   Neck: Normal range of motion. Neck supple.   Cardiovascular: Normal rate and intact distal pulses.   Pulmonary/Chest: Effort normal. He has decreased breath sounds in the right lower field and the left lower field.   Abdominal: Soft. He exhibits no distension. Bowel sounds are increased. There is generalized tenderness.   Musculoskeletal: Normal range of motion.   Neurological: He has normal strength. He appears lethargic. GCS eye subscore is 4. GCS verbal subscore is 5. GCS motor  subscore is 6.   Skin: Skin is warm and dry.   Psychiatric: He has a normal mood and affect. His speech is normal and behavior is normal.           Significant Labs:   CBC:   Recent Labs   Lab 06/21/19  1727   WBC 5.59   HGB 13.0*   HCT 38.5*        CMP:   Recent Labs   Lab 06/21/19  1727      K 4.1      CO2 30*      BUN 13   CREATININE 4.9*   CALCIUM 10.1   PROT 9.5*   ALBUMIN 3.8   BILITOT 0.5   ALKPHOS 331*   AST 30   ALT 28   ANIONGAP 12   EGFRNONAA 12*       Significant Imaging: I have reviewed all pertinent imaging results/findings within the past 24 hours.    Assessment/Plan:     * Chronic upper GI bleeding  H/H- 13/38.5, baseline appears 9-12/27-36.  Recent EGD with erosive gastritis, chronic bleed. Believe this is more chronic than acute bleeding. Trend CBC and monitor for more high volume bloody emesis    CBC Q6  Consult GI  Protonix gtt    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, without long-term current use of insulin  BG- 100    Diet controlled  BG Q6 while NPO          ESRD on hemodialysis  Patient MWF dialysis patient    Consult Nephrology      Dyslipidemia  Lipid Panel pending    Hold Lipitor for now        VTE Risk Mitigation (From admission, onward)        Ordered     Place MEL hose  Until discontinued      06/21/19 2313     IP VTE HIGH RISK PATIENT  Once      06/21/19 2313     Reason for No Pharmacological VTE Prophylaxis  Once      06/21/19 2313     Place sequential compression device  Until discontinued      06/21/19 2313             Hakan Purcell NP  Department of Hospital Medicine   [facility]

## 2019-06-22 NOTE — ASSESSMENT & PLAN NOTE
-Platelets normal on admit and mildly low today  -Could be due to consumption from bleeding/clotting  -Repeat labs in AM.

## 2019-06-22 NOTE — ED NOTES
Pt lying in bed, respirations even, unlabored, eyes open spontaneously, NAD noted, call bell within reach, will continue to monitor

## 2019-06-22 NOTE — ASSESSMENT & PLAN NOTE
-Mr. Retana was admitted to inpatient status  -He had bloody emesis which has occurred on prior occasions as well.  -Recent EGD with erosive gastritis, chronic bleed.   -Baseline Hb 9-12.  Admit H/H above this and it is still within this range this morning  -No further episodes  -Seen by Dr. Tian with GI and we have discussed the case.  -No endoscopy at this time.  -Continue h/h q12 to ensure stability  -Continue bid ppi  -Advance diet.  -Transfer to the floor.

## 2019-06-22 NOTE — ED NOTES
Pt lying in bed, respirations even, unlabored, eyes closed, appears to be resting, NAD noted, AAOX4, answering questions appropriately. Will continue to montior

## 2019-06-22 NOTE — SUBJECTIVE & OBJECTIVE
Interval History: No acute events overnight.  No further nausea or vomiting.  States he is very hungry.    Review of Systems   Constitutional: Negative for activity change and appetite change.   HENT: Negative for congestion and dental problem.    Eyes: Negative for discharge and itching.   Respiratory: Negative for apnea and chest tightness.    Cardiovascular: Negative for chest pain and leg swelling.   Gastrointestinal: Negative for abdominal distention and abdominal pain.   Genitourinary: Negative for difficulty urinating and dysuria.   Musculoskeletal: Negative for arthralgias and back pain.   Neurological: Negative for dizziness and facial asymmetry.   Psychiatric/Behavioral: Negative for agitation.     Objective:     Vital Signs (Most Recent):  Temp: 97.8 °F (36.6 °C) (06/22/19 0800)  Pulse: 93 (06/22/19 1200)  Resp: 10 (06/22/19 0800)  BP: 117/73 (06/22/19 1200)  SpO2: 100 % (06/22/19 0800) Vital Signs (24h Range):  Temp:  [97.8 °F (36.6 °C)-98.6 °F (37 °C)] 97.8 °F (36.6 °C)  Pulse:  [68-93] 93  Resp:  [8-21] 10  SpO2:  [95 %-100 %] 100 %  BP: (103-165)/(68-89) 117/73     Weight: 63.8 kg (140 lb 10.5 oz)  Body mass index is 22.7 kg/m².    Intake/Output Summary (Last 24 hours) at 6/22/2019 1248  Last data filed at 6/22/2019 1112  Gross per 24 hour   Intake 813.67 ml   Output --   Net 813.67 ml      Physical Exam   Constitutional: He is oriented to person, place, and time. He appears well-developed and well-nourished.   HENT:   Head: Normocephalic and atraumatic.   Eyes: Pupils are equal, round, and reactive to light. EOM are normal.   Neck: Normal range of motion. Neck supple.   Cardiovascular: Normal rate, regular rhythm and normal heart sounds.   Pulmonary/Chest: Effort normal and breath sounds normal. No respiratory distress.   Abdominal: Soft. Bowel sounds are normal. He exhibits no distension. There is no tenderness.   Musculoskeletal: Normal range of motion. He exhibits no edema.   Left bka is c/d/i.    Neurological: He is oriented to person, place, and time. No cranial nerve deficit. Coordination normal.   Skin: Skin is warm and dry.   Arm fistula with palpable thrill   Psychiatric: He has a normal mood and affect. His behavior is normal.   Vitals reviewed.      Significant Labs: All pertinent labs within the past 24 hours have been reviewed.    Significant Imaging: I have reviewed and interpreted all pertinent imaging results/findings within the past 24 hours.

## 2019-06-22 NOTE — CONSULTS
Gastroenterology Consult    6/22/2019  9:06 AM    Consulting Physician:  Fredy Tian MD    Primary Care Provider: Yvette Zelaya NP    Reason for consultation: GI bleed    HPI:  Vaughn Retana is a 54 y.o. male who presents with complaints of acute onset coffee ground emesis with no known trigger and no aggravating/alleviating factors.  Multiple episodes prior to presentation with the ED.  He was admitted to the ICU for serial Hb/Hct and further monitoring after being held NPO and placed on a protonix infusion.  Review of old chart shows multiple admissions for similar episodes over the past 18 months including 4 EGD's all with consistent findings.  Most recent EGD 3/2019 with LA Grade D esophagitis and a 2 cm hiatal hernia.  Since admission, no further nausea/vomiting.  Hb has remained stable.    Past Medical History:  Past Medical History:   Diagnosis Date    Amputation stump pain 9/10/2013    Aspiration pneumonia 7/27/2015    Asterixis 11/8/2016    C. difficile colitis 8/7/2015    Cholelithiasis without obstruction 8/25/2015    Chronic diastolic heart failure     2-23-17   1 - Low normal to mildly depressed left ventricular systolic function (EF 50-55%).    2 - Right ventricular enlargement with mildly depressed systolic function.    3 - Left ventricular diastolic dysfunction.    4 - Right atrial enlargement.    5 - Severe tricuspid regurgitation.    6 - Pulmonary hypertension. The estimated PA systolic pressure is 86 mmHg.    7 - Increased central venous pressure.    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 8/1/2015    Metabolic acidosis, IAG, reduced excretion of inorganic acids     Myoclonic jerking 9/20/2016    Noncompliance with medication regimen 12/4/2018    Secondary hyperparathyroidism (of renal origin)     Secondary pulmonary hypertension 3/23/2017    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    TB lung, latent 08/25/2015    Negative Quantiferon Gold 3-23-17       Allergies:   Review of patient's allergies indicates:   Allergen Reactions    Fosrenol [lanthanum] Nausea And Vomiting     Nausea and vomiting       Current Medications:  Medications Prior to Admission   Medication Sig Dispense Refill Last Dose    amLODIPine (NORVASC) 10 MG tablet Take 1 tablet (10 mg total) by mouth once daily. 90 tablet 1 Unknown at Unknown time    atorvastatin (LIPITOR) 80 MG tablet Take 1 tablet (80 mg total) by mouth every evening. 90 tablet 1 Unknown at Unknown time    carvedilol (COREG) 25 MG tablet Take 1 tablet (25 mg total) by mouth 2 (two) times daily with meals. 180 tablet 1 Unknown at Unknown time    ondansetron (ZOFRAN) 8 MG tablet Take 1 tablet (8 mg total) by mouth every 8 (eight) hours as needed for Nausea. 40 tablet 0 Unknown at Unknown time    pantoprazole (PROTONIX) 40 MG tablet Take 1 tablet (40 mg total) by mouth 2 (two) times daily before meals. 60 tablet 11     RENAPLEX-D 800 mcg-12.5 mg -2,000 unit Tab Take 1 tablet by mouth once daily.  3 Unknown at Unknown time    sevelamer carbonate (RENVELA) 800 mg Tab TAKE 2 TABLETS BY MOUTH THREE TIMES A DAY WITH MEALS  6 Unknown at Unknown time    sucralfate (CARAFATE) 100 mg/mL suspension Take 5 mLs (500 mg total) by mouth 2 (two) times daily. 1 Bottle 3     dicyclomine (BENTYL) 10 MG capsule Take 1 capsule (10 mg total) by mouth before meals as needed (TID PRN). For belching 90 capsule 0 Unknown at  Unknown time         Social History:  Social History     Socioeconomic History    Marital status:      Spouse name: Not on file    Number of children: Not on file    Years of education: Not on file    Highest education level: Not on file   Occupational History    Not on file   Social Needs    Financial resource strain: Not on file    Food insecurity:     Worry: Not on file     Inability: Not on file    Transportation needs:     Medical: Not on file     Non-medical: Not on file   Tobacco Use    Smoking status: Former Smoker     Packs/day: 1.00     Years: 10.00     Pack years: 10.00    Smokeless tobacco: Never Used   Substance and Sexual Activity    Alcohol use: No    Drug use: No    Sexual activity: Yes     Partners: Female     Birth control/protection: None   Lifestyle    Physical activity:     Days per week: Not on file     Minutes per session: Not on file    Stress: Not on file   Relationships    Social connections:     Talks on phone: Not on file     Gets together: Not on file     Attends [employer] service: Not on file     Active member of club or organization: Not on file     Attends meetings of clubs or organizations: Not on file     Relationship status: Not on file   Other Topics Concern    Not on file   Social History Narrative    Not on file       Surgical History:  Past Surgical History:   Procedure Laterality Date    AMPUTATION, BELOW KNEE Left 12/18/2013    Performed by Elgin Houston MD at [facility] OR 1ST FLR    COLONOSCOPY      COLONOSCOPY N/A 4/4/2017    Performed by Walker Stern MD at [facility] ENDO (2ND FLR)    EGD (ESOPHAGOGASTRODUODENOSCOPY) N/A 3/7/2019    Performed by Man Galicia MD at [facility] ENDO (2ND FLR)    EGD (ESOPHAGOGASTRODUODENOSCOPY) N/A 6/12/2018    Performed by Man Galicia MD at [facility] ENDO (2ND FLR)    ESOPHAGOGASTRODUODENOSCOPY (EGD) N/A 5/22/2018    Performed by Ke Sparks MD at [facility] ENDO (2ND FLR)    ESOPHAGOGASTRODUODENOSCOPY (EGD) N/A  3/16/2018    Performed by Kevin De La Paz MD at [facility] ENDO (2ND FLR)    ESOPHAGOGASTRODUODENOSCOPY (EGD) N/A 3/8/2018    Performed by Man Galicia MD at [facility] ENDO (2ND FLR)    ESOPHAGOGASTRODUODENOSCOPY (EGD) N/A 4/4/2017    Performed by Walker Stern MD at [facility] ENDO (2ND FLR)    ESOPHAGOGASTRODUODENOSCOPY (EGD) N/A 10/17/2014    Performed by Man Galicia MD at [facility] ENDO (2ND FLR)    FISTULOGRAM Right 9/18/2014    Performed by Grayson Hubbard MD at [facility] CATH LAB    FOOT AMPUTATION THROUGH METATARSAL      left foot    LEG AMPUTATION THROUGH KNEE  12/18/2013    left BKA    R AVF  9/12/12    UPPER GASTROINTESTINAL ENDOSCOPY           Family History:  Family History   Problem Relation Age of Onset    Early death Mother     Kidney disease Father     Hypertension Father     Heart disease Father     Hyperlipidemia Father     Diabetes Brother     Alcohol abuse Maternal Grandmother     Diabetes Maternal Grandfather     Hypertension Sister     Melanoma Neg Hx        Review of systems:   Constitutional: Positive for activity change and fatigue. Negative for appetite change and fever.   HENT: Negative for congestion, ear pain and postnasal drip.    Eyes: Negative for discharge.   Respiratory: Negative for apnea, shortness of breath and wheezing.    Cardiovascular: Negative for chest pain and leg swelling.   Gastrointestinal: Positive for nausea and vomiting (coffee ground). Negative for abdominal distention and abdominal pain.   Endocrine: Negative for polydipsia, polyphagia and polyuria.   Genitourinary: Negative for difficulty urinating, flank pain, frequency, hematuria and urgency.   Musculoskeletal: Positive for arthralgias and myalgias. Negative for joint swelling.   Skin: Negative for pallor and rash.   Allergic/Immunologic: Negative for environmental allergies and food allergies.   Neurological: Negative for dizziness, speech difficulty, weakness, light-headedness and headaches.    Hematological: Does not bruise/bleed easily.   Psychiatric/Behavioral: Negative for agitation.      Physical Exam:  Vital Signs (Most Recent):  Temp: 97.8 °F (36.6 °C) (06/22/19 0800)  Pulse: 68 (06/22/19 0800)  Resp: 10 (06/22/19 0800)  BP: 128/79 (06/22/19 0800)  SpO2: 100 % (06/22/19 0800) Vital Signs (24h Range):  Temp:  [97.8 °F (36.6 °C)-98.6 °F (37 °C)] 97.8 °F (36.6 °C)  Pulse:  [68-88] 68  Resp:  [8-21] 10  SpO2:  [95 %-100 %] 100 %  BP: (103-165)/(68-89) 128/79       General: Well developed, well nourished, male in no acute distress.    Eyes:  Anicteric sclera, PERRLA  ENT:  Moist mucous membranes, no drainage from ears or nose, hearing grossly intact  Lymph:  No cervical, supraclavicular or axillary lymphadenopathy  Neck:  Supple, no nodes or masses felt, no thyromegaly  Cardiovascular:  Regular rate and rhythm without murmur  Lungs:  Clear to auscultation with normal effort; no wheezes or rales noted  GI:  Soft, NTND, no masses, bowel sounds present  Musculoskeletal:  5/5 strength bilaterally  Extremities: No clubbing, cyanosis, or edema, 2+ dorsalis pedis bilaterally  Neurologic:  No focal deficits, alert and oriented x 3  Psych:  Appropriate mood and affect  Skin:  No rash, no pallor, no lesions       Labs:  Results for LORA DUNCAN (MRN [id number]) as of 6/22/2019 09:11   Ref. Range 6/21/2019 17:27   Hemoglobin Latest Ref Range: 14.0 - 18.0 g/dL 13.0 (L)   Hematocrit Latest Ref Range: 40.0 - 54.0 % 38.5 (L)     Results for LORA DUNCAN (MRN [id number]) as of 6/22/2019 09:11   Ref. Range 6/22/2019 03:33   WBC Latest Ref Range: 3.90 - 12.70 K/uL 5.33   RBC Latest Ref Range: 4.60 - 6.20 M/uL 3.95 (L)   Hemoglobin Latest Ref Range: 14.0 - 18.0 g/dL 12.4 (L)   Hematocrit Latest Ref Range: 40.0 - 54.0 % 37.2 (L)   MCV Latest Ref Range: 82 - 98 fL 94   MCH Latest Ref Range: 27.0 - 31.0 pg 31.4 (H)   MCHC Latest Ref Range: 32.0 - 36.0 g/dL 33.3   RDW Latest Ref Range: 11.5 - 14.5 % 13.5   Platelets  Latest Ref Range: 150 - 350 K/uL 139 (L)     Results for LORA DUNCAN (MRN [id number]) as of 6/22/2019 09:11   Ref. Range 6/22/2019 03:32   Sodium Latest Ref Range: 136 - 145 mmol/L 145   Potassium Latest Ref Range: 3.5 - 5.1 mmol/L 3.0 (L)   Chloride Latest Ref Range: 95 - 110 mmol/L 101   CO2 Latest Ref Range: 23 - 29 mmol/L 30 (H)   Anion Gap Latest Ref Range: 8 - 16 mmol/L 14   BUN, Bld Latest Ref Range: 6 - 20 mg/dL 19   Creatinine Latest Ref Range: 0.5 - 1.4 mg/dL 5.9 (H)   eGFR if non African American Latest Ref Range: >60 mL/min/1.73 m^2 10 (A)   eGFR if African American Latest Ref Range: >60 mL/min/1.73 m^2 11 (A)   Glucose Latest Ref Range: 70 - 110 mg/dL 74   Calcium Latest Ref Range: 8.7 - 10.5 mg/dL 9.9   Phosphorus Latest Ref Range: 2.7 - 4.5 mg/dL 3.7   Magnesium Latest Ref Range: 1.6 - 2.6 mg/dL 2.1   Alkaline Phosphatase Latest Ref Range: 55 - 135 U/L 298 (H)   PROTEIN TOTAL Latest Ref Range: 6.0 - 8.4 g/dL 8.5 (H)   Albumin Latest Ref Range: 3.5 - 5.2 g/dL 3.5   BILIRUBIN TOTAL Latest Ref Range: 0.1 - 1.0 mg/dL 0.5   AST Latest Ref Range: 10 - 40 U/L 26   ALT Latest Ref Range: 10 - 44 U/L 22   Triglycerides Latest Ref Range: 30 - 150 mg/dL 59     Imaging and Other Studies:  Single frontal view of the chest was performed.    COMPARISON:  03/06/2019.    FINDINGS:  Cardiac silhouette is normal in size.  Lungs are symmetrically expanded.  No evidence of focal consolidative process, pneumothorax, or significant effusion.  No acute osseous abnormality identified.  Right subclavian vascular stent is noted.      Impression       No acute cardiopulmonary process identified         Assessment:  1.  Coffee ground emesis with stable Hb/Hct, improved symptoms since admission  2.  Type 2 DM  3.  ESRD on HD    Plan:  1.  Change Pantoprazole to BID, will need to continue with BID dosing [place] [facility]  2.  Possible degree of diabetic gastroparesis, would recommend outpatient GES prior to GI follow up  to determine whether promotility agent needed.  3.  Given stability of Hb and multiple EGD's in the past, will defer at this time and treat based on recent endoscopic findings.  4.  Advance to renal ADA diet.    Will follow peripherally.  Case discussed with admitting physician.    Fredy Tian

## 2019-06-22 NOTE — HPI
The patient is a 54 y.o. male who presents with complaint of vomiting dark blood since yesterday. He denies abdominal pain, current nausea, or blood in stool. He was last dialyzed today and received full treatment. He has not followed up with GI since he was hospitalized with intractable vomiting three months ago. He is compliant with Protonix.  While in ER, patient had multiple large coffee ground emesis.

## 2019-06-22 NOTE — NURSING TRANSFER
Nursing Transfer Note      6/22/2019     Transfer to [facility] room 320.    Transfer via stretcher     Transfer with prosthetic leg and clothing     Transported by Maia MEDRANO RN and transport    Medicines sent: yes and given to Elena GUEVARA    Chart send with patient: yes    Notified: Elena GUEVARA    Patient reassessed at:1530    Upon arrival to floor: Pt assisted from stretcher to bed, bed in lowest position, bed alarm set, call light in reach, Elena RN at bedside, tele monitor applied to pt's chest.

## 2019-06-22 NOTE — ED NOTES
Assumed care of pt, pt lying in bed, respirations even, unlabored, eyes open spontaneously, NAD noted. Pt updated on plan of care, will continue to monitor.

## 2019-06-23 LAB
ALBUMIN SERPL BCP-MCNC: 3.5 G/DL (ref 3.5–5.2)
ALP SERPL-CCNC: 288 U/L (ref 55–135)
ALT SERPL W/O P-5'-P-CCNC: 22 U/L (ref 10–44)
ANION GAP SERPL CALC-SCNC: 14 MMOL/L (ref 8–16)
AST SERPL-CCNC: 28 U/L (ref 10–40)
BASOPHILS # BLD AUTO: 0.03 K/UL (ref 0–0.2)
BASOPHILS NFR BLD: 0.4 % (ref 0–1.9)
BILIRUB SERPL-MCNC: 0.5 MG/DL (ref 0.1–1)
BUN SERPL-MCNC: 15 MG/DL (ref 6–20)
CALCIUM SERPL-MCNC: 10.2 MG/DL (ref 8.7–10.5)
CHLORIDE SERPL-SCNC: 99 MMOL/L (ref 95–110)
CO2 SERPL-SCNC: 29 MMOL/L (ref 23–29)
CREAT SERPL-MCNC: 5.3 MG/DL (ref 0.5–1.4)
DIFFERENTIAL METHOD: ABNORMAL
EOSINOPHIL # BLD AUTO: 0.6 K/UL (ref 0–0.5)
EOSINOPHIL NFR BLD: 8.5 % (ref 0–8)
ERYTHROCYTE [DISTWIDTH] IN BLOOD BY AUTOMATED COUNT: 13.7 % (ref 11.5–14.5)
EST. GFR  (AFRICAN AMERICAN): 13 ML/MIN/1.73 M^2
EST. GFR  (NON AFRICAN AMERICAN): 11 ML/MIN/1.73 M^2
GLUCOSE SERPL-MCNC: 78 MG/DL (ref 70–110)
HCT VFR BLD AUTO: 38.2 % (ref 40–54)
HGB BLD-MCNC: 12.8 G/DL (ref 14–18)
LYMPHOCYTES # BLD AUTO: 2 K/UL (ref 1–4.8)
LYMPHOCYTES NFR BLD: 29.8 % (ref 18–48)
MAGNESIUM SERPL-MCNC: 1.9 MG/DL (ref 1.6–2.6)
MCH RBC QN AUTO: 31.7 PG (ref 27–31)
MCHC RBC AUTO-ENTMCNC: 33.5 G/DL (ref 32–36)
MCV RBC AUTO: 95 FL (ref 82–98)
MONOCYTES # BLD AUTO: 0.7 K/UL (ref 0.3–1)
MONOCYTES NFR BLD: 10.9 % (ref 4–15)
NEUTROPHILS # BLD AUTO: 3.4 K/UL (ref 1.8–7.7)
NEUTROPHILS NFR BLD: 50.3 % (ref 38–73)
PLATELET # BLD AUTO: 143 K/UL (ref 150–350)
PMV BLD AUTO: 11.3 FL (ref 9.2–12.9)
POCT GLUCOSE: 109 MG/DL (ref 70–110)
POCT GLUCOSE: 110 MG/DL (ref 70–110)
POCT GLUCOSE: 118 MG/DL (ref 70–110)
POCT GLUCOSE: 122 MG/DL (ref 70–110)
POTASSIUM SERPL-SCNC: 3.3 MMOL/L (ref 3.5–5.1)
PROT SERPL-MCNC: 8.7 G/DL (ref 6–8.4)
RBC # BLD AUTO: 4.04 M/UL (ref 4.6–6.2)
SODIUM SERPL-SCNC: 142 MMOL/L (ref 136–145)
WBC # BLD AUTO: 6.72 K/UL (ref 3.9–12.7)

## 2019-06-23 PROCEDURE — 80053 COMPREHEN METABOLIC PANEL: CPT

## 2019-06-23 PROCEDURE — 36415 COLL VENOUS BLD VENIPUNCTURE: CPT

## 2019-06-23 PROCEDURE — 11000001 HC ACUTE MED/SURG PRIVATE ROOM

## 2019-06-23 PROCEDURE — 99233 PR SUBSEQUENT HOSPITAL CARE,LEVL III: ICD-10-PCS | Mod: ,,, | Performed by: HOSPITALIST

## 2019-06-23 PROCEDURE — 25000003 PHARM REV CODE 250: Performed by: HOSPITALIST

## 2019-06-23 PROCEDURE — 97535 SELF CARE MNGMENT TRAINING: CPT

## 2019-06-23 PROCEDURE — 97165 OT EVAL LOW COMPLEX 30 MIN: CPT

## 2019-06-23 PROCEDURE — 25000003 PHARM REV CODE 250: Performed by: NURSE PRACTITIONER

## 2019-06-23 PROCEDURE — 83735 ASSAY OF MAGNESIUM: CPT

## 2019-06-23 PROCEDURE — 85025 COMPLETE CBC W/AUTO DIFF WBC: CPT

## 2019-06-23 PROCEDURE — 99233 SBSQ HOSP IP/OBS HIGH 50: CPT | Mod: ,,, | Performed by: HOSPITALIST

## 2019-06-23 PROCEDURE — 94761 N-INVAS EAR/PLS OXIMETRY MLT: CPT

## 2019-06-23 RX ORDER — POTASSIUM CHLORIDE 750 MG/1
30 TABLET, EXTENDED RELEASE ORAL ONCE
Status: COMPLETED | OUTPATIENT
Start: 2019-06-23 | End: 2019-06-23

## 2019-06-23 RX ORDER — SODIUM CHLORIDE 9 MG/ML
INJECTION, SOLUTION INTRAVENOUS CONTINUOUS
Status: DISCONTINUED | OUTPATIENT
Start: 2019-06-23 | End: 2019-06-23

## 2019-06-23 RX ORDER — SODIUM CHLORIDE 9 MG/ML
INJECTION, SOLUTION INTRAVENOUS CONTINUOUS
Status: ACTIVE | OUTPATIENT
Start: 2019-06-23 | End: 2019-06-23

## 2019-06-23 RX ADMIN — MUPIROCIN: 20 OINTMENT TOPICAL at 09:06

## 2019-06-23 RX ADMIN — NEPHROCAP 1 CAPSULE: 1 CAP ORAL at 10:06

## 2019-06-23 RX ADMIN — SUCRALFATE 0.5 G: 1 SUSPENSION ORAL at 09:06

## 2019-06-23 RX ADMIN — SUCRALFATE 0.5 G: 1 SUSPENSION ORAL at 08:06

## 2019-06-23 RX ADMIN — SODIUM CHLORIDE: 0.9 INJECTION, SOLUTION INTRAVENOUS at 10:06

## 2019-06-23 RX ADMIN — MUPIROCIN: 20 OINTMENT TOPICAL at 08:06

## 2019-06-23 RX ADMIN — SODIUM CHLORIDE 500 ML: 0.9 INJECTION, SOLUTION INTRAVENOUS at 05:06

## 2019-06-23 RX ADMIN — PANTOPRAZOLE SODIUM 40 MG: 40 TABLET, DELAYED RELEASE ORAL at 09:06

## 2019-06-23 RX ADMIN — POTASSIUM CHLORIDE 30 MEQ: 750 TABLET, EXTENDED RELEASE ORAL at 10:06

## 2019-06-23 RX ADMIN — PANTOPRAZOLE SODIUM 40 MG: 40 TABLET, DELAYED RELEASE ORAL at 08:06

## 2019-06-23 NOTE — PROGRESS NOTES
[facility] Hospital Medicine  Progress Note    Patient Name: Vaughn Retana  MRN: [id number]  Patient Class: IP- Inpatient   Admission Date: 6/21/2019  Length of Stay: 2 days  Attending Physician: Lucho Whitfield MD  Primary Care Provider: Yvette Zelaya NP        Subjective:     Principal Problem:Chronic upper GI bleeding      HPI:  The patient is a 54 y.o. male who presents with complaint of vomiting dark blood since yesterday. He denies abdominal pain, current nausea, or blood in stool. He was last dialyzed today and received full treatment. He has not followed up with GI since he was hospitalized with intractable vomiting three months ago. He is compliant with Protonix.  While in ER, patient had multiple large coffee ground emesis.      Overview/Hospital Course:  No notes on file    Interval History: Stepped down to the floor yesterday.  Had hypotension with dialysis and again an episode overnight.  No apparent symptoms.  No further nausea or vomiting.      Review of Systems   Constitutional: Negative for activity change and appetite change.   HENT: Negative for congestion and dental problem.    Eyes: Negative for discharge and itching.   Respiratory: Negative for apnea and chest tightness.    Cardiovascular: Negative for chest pain and leg swelling.   Gastrointestinal: Negative for abdominal distention and abdominal pain.   Genitourinary: Negative for difficulty urinating and dysuria.   Musculoskeletal: Negative for arthralgias and back pain.   Neurological: Negative for dizziness and facial asymmetry.   Psychiatric/Behavioral: Negative for agitation.     Objective:     Vital Signs (Most Recent):  Temp: 98.9 °F (37.2 °C) (06/23/19 1636)  Pulse: 72 (06/23/19 1636)  Resp: 20 (06/23/19 1636)  BP: 106/70 (06/23/19 1636)  SpO2: 99 % (06/23/19 1636) Vital Signs (24h Range):  Temp:  [97.9 °F (36.6 °C)-98.9 °F (37.2 °C)] 98.9 °F (37.2 °C)  Pulse:  [70-86] 72  Resp:  [12-20] 20  SpO2:  [96  %-99 %] 99 %  BP: ()/(52-70) 106/70     Weight: 63.8 kg (140 lb 10.5 oz)  Body mass index is 22.7 kg/m².  No intake or output data in the 24 hours ending 06/23/19 1647   Physical Exam   Constitutional: He is oriented to person, place, and time. He appears well-developed and well-nourished.   HENT:   Head: Normocephalic and atraumatic.   Eyes: Pupils are equal, round, and reactive to light. EOM are normal.   Neck: Normal range of motion. Neck supple.   Cardiovascular: Normal rate, regular rhythm and normal heart sounds.   Pulmonary/Chest: Effort normal and breath sounds normal. No respiratory distress.   Abdominal: Soft. Bowel sounds are normal. He exhibits no distension. There is no tenderness.   Musculoskeletal: Normal range of motion. He exhibits no edema.   Left bka is c/d/i.   Neurological: He is oriented to person, place, and time. No cranial nerve deficit. Coordination normal.   Skin: Skin is warm and dry.   Arm fistula with palpable thrill   Psychiatric: He has a normal mood and affect. His behavior is normal.   Vitals reviewed.      Significant Labs: All pertinent labs within the past 24 hours have been reviewed.    Significant Imaging: I have reviewed and interpreted all pertinent imaging results/findings within the past 24 hours.      Assessment/Plan:      * Chronic upper GI bleeding  -Mr. Retana was admitted to inpatient status in the ICU and stepped down on 6/22  -Prior to admit he had bloody emesis which has occurred on prior occasions as well.  -Recent EGD with erosive gastritis, chronic bleed.   -Baseline Hb 9-12.  Admit H/H above this and it is still within this range  -No further episodes of vomiting  -Seen by Dr. Tian with GI and we have discussed the case.  -No endoscopy at this time.  -Continue daily cbc  -Continue bid ppi  -Continue diet.    Hemodialysis-associated hypotension  -Hypotensive with dialysis yesterday and once again overnight  -Will give additional small fluid bolus  today.  -No evidence of active bleeding or sepsis  -Keep overnight to ensure stable blood pressure.      ESRD on hemodialysis  -At home is on dialysis MWF dialysis patient  -Received dialysis yesterday with hypotension  -Plan to repeat dialysis tomorrow and if stable discharge home after this  -Appreciate input from Dr. Michele    Thrombocytopenia  -Platelets normal on admit and mildly low today  -Could be due to consumption from bleeding/clotting  -Repeat labs in AM.      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, without long-term current use of insulin  -A1c 4.7  -Accu checks qac and hs   -Diet controlled at home  -Diabetic diet.          Renovascular hypertension  -Place hold parameters on coreg and norvasc due to decreased blood pressure today    Hypokalemia  -Gently replace today  -Repeat labs in AM.      Dyslipidemia  -Continue statin        VTE Risk Mitigation (From admission, onward)        Ordered     Place MEL hose  Until discontinued      06/21/19 2313     IP VTE HIGH RISK PATIENT  Once      06/21/19 2313     Reason for No Pharmacological VTE Prophylaxis  Once      06/21/19 2313     Place sequential compression device  Until discontinued      06/21/19 2313                Lucho Whitfield MD  Department of Hospital Medicine   [facility]

## 2019-06-23 NOTE — PROGRESS NOTES
Progress Note  Nephrology      Consult Requested By: Lucho Whitfield MD      SUBJECTIVE:       Patient is a 54 y.o. male who was seen and examined at bedside.  He is without c/o chest pain, shortness of breath, nausea or vomiting. Blood pressure dropped into systolic of 70s during the night.    Patient Active Problem List   Diagnosis    Anemia in chronic kidney disease    Secondary hyperparathyroidism of renal origin    Dyslipidemia    ESRD on hemodialysis    Peripheral vascular disease in diabetes mellitus    History of left below knee amputation 12/18/13    Singultus    History of Intracerebral Hemorrhage: L BG 5/2013; R BG 9/2016; R BG 11/2016; L caudate head 2/2017    Secondary pulmonary hypertension    DVT (deep venous thrombosis)    Chronic kidney disease-mineral and bone disorder    Hypokalemia    Renovascular hypertension    UGIB (upper gastrointestinal bleed)    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, without long-term current use of insulin    Normocytic anemia    History of GI bleed    Erosive gastritis    Dialysis patient    Dehydration    Hemoptysis    Hypertensive urgency    Noncompliance    Severe malnutrition    History of TB (tuberculosis)    Weakness    Intractable vomiting with nausea    Thrombocytopenia    Portal hypertension     Past Medical History:   Diagnosis Date    Amputation stump pain 9/10/2013    Aspiration pneumonia 7/27/2015    Asterixis 11/8/2016    C. difficile colitis 8/7/2015    Cholelithiasis without obstruction 8/25/2015    Chronic diastolic heart failure     2-23-17   1 - Low normal to mildly depressed left ventricular systolic function (EF 50-55%).    2 - Right ventricular enlargement with mildly depressed systolic function.    3 - Left ventricular diastolic dysfunction.    4 - Right atrial enlargement.    5 - Severe tricuspid regurgitation.    6 - Pulmonary hypertension. The estimated PA systolic pressure is 86 mmHg.     7 - Increased central venous pressure.    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    Metabolic acidosis, IAG, reduced excretion of inorganic acids     Myoclonic jerking 9/20/2016    Noncompliance with medication regimen 12/4/2018    Secondary hyperparathyroidism (of renal origin)     Secondary pulmonary hypertension 3/23/2017    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    TB lung, latent 08/25/2015    Negative Quantiferon Gold 3-23-17              OBJECTIVE:     Vitals:    06/23/19 0600 06/23/19 0700 06/23/19 0712 06/23/19 0800   BP:   92/64    BP Location:       Patient Position:   Lying    Pulse: 75 72 75 70   Resp:   18    Temp:   98.7 °F (37.1 °C)    TempSrc:   Oral    SpO2:   98%    Weight:       Height:           Temp: 98.7 °F (37.1 °C) (06/23/19 0712)  Pulse: 70 (06/23/19 0800)  Resp: 18 (06/23/19 0712)  BP: 92/64 (06/23/19 0712)  SpO2: 98 % (06/23/19 0712)              Medications:   amLODIPine  10 mg Oral Daily    carvedilol  25 mg Oral BID WM    mupirocin   Nasal BID    ondansetron  4 mg Oral Once    pantoprazole  40 mg Oral BID    potassium chloride  30 mEq Oral Once    sevelamer carbonate  1,600 mg Oral TID WM    sucralfate  0.5 g Oral BID    vitamin renal formula (B-complex-vitamin c-folic acid)  1 capsule Oral Daily      sodium chloride 0.9%                 Physical Exam:  General appearance: well developed, well nourished  Lungs:  clear to  auscultation bilaterally and normal respiratory effort  Chest wall: no tenderness  Heart: regular rate and rhythm, S1, S2 normal, no murmur, click, rub or gallop  Abdomen: soft, non-tender non-distented; bowel sounds normal; no masses,  no organomegaly  Extremities:(L) BKA,  no cyanosis or edema, or clubbing  Skin: Skin color, texture, turgor normal. No rashes or lesions  Neurologic: Normal strength and tone. No focal numbness or weakness      Laboratory:  ABG  Labs reviewed  No results found for this or any previous visit (from the past 336 hour(s)).  Recent Results (from the past 336 hour(s))   CBC with Automated Differential    Collection Time: 06/23/19  8:10 AM   Result Value Ref Range    WBC 6.72 3.90 - 12.70 K/uL    Hemoglobin 12.8 (L) 14.0 - 18.0 g/dL    Hematocrit 38.2 (L) 40.0 - 54.0 %    Platelets 143 (L) 150 - 350 K/uL   CBC with Automated Differential    Collection Time: 06/22/19  9:48 PM   Result Value Ref Range    WBC 6.22 3.90 - 12.70 K/uL    Hemoglobin 13.2 (L) 14.0 - 18.0 g/dL    Hematocrit 39.0 (L) 40.0 - 54.0 %    Platelets 153 150 - 350 K/uL   CBC with Automated Differential    Collection Time: 06/22/19  3:33 AM   Result Value Ref Range    WBC 5.33 3.90 - 12.70 K/uL    Hemoglobin 12.4 (L) 14.0 - 18.0 g/dL    Hematocrit 37.2 (L) 40.0 - 54.0 %    Platelets 139 (L) 150 - 350 K/uL     Urinalysis  No results for input(s): COLORU, CLARITYU, SPECGRAV, PHUR, PROTEINUA, GLUCOSEU, BILIRUBINCON, BLOODU, WBCU, RBCU, BACTERIA, MUCUS, NITRITE, LEUKOCYTESUR, UROBILINOGEN, HYALINECASTS in the last 24 hours.    Diagnostic Results:  X-Ray: Reviewed  US: Reviewed  Echo: Reviewed  ACCESS    ASSESSMENT/PLAN:     Patient Active Problem List   Diagnosis    Anemia in chronic kidney disease    Secondary hyperparathyroidism of renal origin    Dyslipidemia    ESRD on hemodialysis    Peripheral vascular disease in diabetes mellitus    History of left below knee amputation 12/18/13    Singultus    History of  Intracerebral Hemorrhage: L BG 5/2013; R BG 9/2016; R BG 11/2016; L caudate head 2/2017    Secondary pulmonary hypertension    DVT (deep venous thrombosis)    Chronic kidney disease-mineral and bone disorder    Hypokalemia    Renovascular hypertension    UGIB (upper gastrointestinal bleed)    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, without long-term current use of insulin    Normocytic anemia    History of GI bleed    Erosive gastritis    Dialysis patient    Dehydration    Hemoptysis    Hypertensive urgency    Noncompliance    Severe malnutrition    History of TB (tuberculosis)    Weakness    Intractable vomiting with nausea    Thrombocytopenia    Portal hypertension       Plan:   1. ESRD - Plan HD on a MWF schedule.  Avoid NSAIDs, other nephrotoxic drugs or procedures. Monitor chemistries (CMP, Mg, Phos, CBC) daily. Placed on renal diet. Renal vitamins.     2. GI Bleed - GI on board, monitor H/H, On PPI. No plans for EGD at this time.     3. Hypertension - The patient became hypotensive post HD. Possibly due to volume removal? H/H appears to be stable with small drop in Hb.     4. Secondary hyperparathyroidism - Plan for vitamin D analog with HD.      5. Hypokalemia - May need to supplement but should monitor closely since he has GIB.

## 2019-06-23 NOTE — SUBJECTIVE & OBJECTIVE
Interval History: Stepped down to the floor yesterday.  Had hypotension with dialysis and again an episode overnight.  No apparent symptoms.  No further nausea or vomiting.      Review of Systems   Constitutional: Negative for activity change and appetite change.   HENT: Negative for congestion and dental problem.    Eyes: Negative for discharge and itching.   Respiratory: Negative for apnea and chest tightness.    Cardiovascular: Negative for chest pain and leg swelling.   Gastrointestinal: Negative for abdominal distention and abdominal pain.   Genitourinary: Negative for difficulty urinating and dysuria.   Musculoskeletal: Negative for arthralgias and back pain.   Neurological: Negative for dizziness and facial asymmetry.   Psychiatric/Behavioral: Negative for agitation.     Objective:     Vital Signs (Most Recent):  Temp: 98.9 °F (37.2 °C) (06/23/19 1636)  Pulse: 72 (06/23/19 1636)  Resp: 20 (06/23/19 1636)  BP: 106/70 (06/23/19 1636)  SpO2: 99 % (06/23/19 1636) Vital Signs (24h Range):  Temp:  [97.9 °F (36.6 °C)-98.9 °F (37.2 °C)] 98.9 °F (37.2 °C)  Pulse:  [70-86] 72  Resp:  [12-20] 20  SpO2:  [96 %-99 %] 99 %  BP: ()/(52-70) 106/70     Weight: 63.8 kg (140 lb 10.5 oz)  Body mass index is 22.7 kg/m².  No intake or output data in the 24 hours ending 06/23/19 1647   Physical Exam   Constitutional: He is oriented to person, place, and time. He appears well-developed and well-nourished.   HENT:   Head: Normocephalic and atraumatic.   Eyes: Pupils are equal, round, and reactive to light. EOM are normal.   Neck: Normal range of motion. Neck supple.   Cardiovascular: Normal rate, regular rhythm and normal heart sounds.   Pulmonary/Chest: Effort normal and breath sounds normal. No respiratory distress.   Abdominal: Soft. Bowel sounds are normal. He exhibits no distension. There is no tenderness.   Musculoskeletal: Normal range of motion. He exhibits no edema.   Left bka is c/d/i.   Neurological: He is oriented  to person, place, and time. No cranial nerve deficit. Coordination normal.   Skin: Skin is warm and dry.   Arm fistula with palpable thrill   Psychiatric: He has a normal mood and affect. His behavior is normal.   Vitals reviewed.      Significant Labs: All pertinent labs within the past 24 hours have been reviewed.    Significant Imaging: I have reviewed and interpreted all pertinent imaging results/findings within the past 24 hours.

## 2019-06-23 NOTE — PT/OT/SLP EVAL
Occupational Therapy   Evaluation/Treatment    Name: Vaughn Retana  MRN: [id number]  Admitting Diagnosis:  Chronic upper GI bleeding      Recommendations:     Discharge Recommendations: assisted living facility  Discharge Equipment Recommendations:  walker, rolling  Barriers to discharge:  Decreased caregiver support    Assessment:     Vaughn Retana is a 54 y.o. male with a medical diagnosis of Chronic upper GI bleeding.  He presents with generalized weakness and decreased activity tolerance. Performance deficits affecting function: weakness, impaired endurance, impaired self care skills, decreased lower extremity function, impaired cognition, impaired functional mobilty.  Pt evaluated from bed level due to low blood pressure. He completed G/H and UBD with Min A and LBD (prosthesis and sock) with SBA EOB. Pt presents with cognitive delay (could not recall facility he lives in, the day/date/year). Continue skilled OT services to address functional mobility and strength in order to participate in self care and ADLs.    Rehab Prognosis: Good; patient would benefit from acute skilled OT services to address these deficits and reach maximum level of function.       Plan:     Patient to be seen 3 x/week to address the above listed problems via self-care/home management, therapeutic activities, therapeutic exercises  · Plan of Care Expires: 07/23/19  · Plan of Care Reviewed with: patient    Subjective     Chief Complaint: Wants to live in a better facility  Patient/Family Comments/goals: No concerns about function.     Occupational Profile:  Living Environment: Pt lives in a residential facility with a roommate (couldn't recall name) with supervisors to check on them. Confirmed with case mgmt--[facility].  Previous level of function: Ambulatory with prosthesis (skill level unknown) without AD however, propels himself in a wheelchair most of the time. Leaves facility to buy groceries. Receives SPV for self  care.  Roles and Routines: Enjoys fishing  Equipment Used at Home:  prosthesis  Assistance upon Discharge: Aides from [facility].    Pain/Comfort:  Pain Rating 1: 0/10  Pain Rating Post-Intervention 1: 0/10     Vital Signs:  Blood pressure values   -     2:02pm-Supine- 121/70  -     2:22pm-sitting EOB before activity-106/65  -     2:38pm-Supine after activity- 89/65    Patients cultural, spiritual, Orthodox conflicts given the current situation: no    Objective:     Communicated with: RN and patient prior to session.  Patient found HOB elevated with telemetry, peripheral IV, SCD upon OT entry to room.    General Precautions: Standard, fall   Orthopedic Precautions:N/A(L BKA with prosthetic )   Braces: N/A     Occupational Performance:    Bed Mobility:    · Patient completed Rolling/Turning to Left with  stand by assistance  · Patient completed Scooting/Bridging with stand by assistance  · Patient completed Supine to Sit with stand by assistance  · Patient completed Sit to Supine with stand by assistance    Functional Mobility/Transfers:  · None.  · Functional Mobility: None.    Activities of Daily Living:  · Feeding:  modified independence while seated  · Grooming: minimum assistance for opening toothbrush package (completed oral hygiene and washing face) while seated   · Upper Body Dressing: minimum assistance for tying/untying gown while seated  · Lower Body Dressing: stand by assistance (don/doff prosthesis, don sock) while seated    Cognitive/Visual Perceptual:  Cognitive/Psychosocial Skills:     -       Oriented to: Person, Place and Situation (Could not recall day/date/year)  -       Follows Commands/attention:Follows one-step commands  -       Communication: delayed responses, minimal slurred speech  -       Memory: Impaired LTM   -       Safety awareness/insight to disability: impaired (wants a scooter but is ambulatory)  -       Mood/Affect/Coping skills/emotional control: Cooperative, Flat affect and  Lethargic  Visual/Perceptual:      -Impaired  wears glasses for far sightedness     Physical Exam:  Postural examination/scapula alignment:    -       Rounded shoulders  -       Forward head  Sensation:    -       Intact both hands light touch  Motor Planning:    -       Slow, minimal delay  Dominant hand:    -       Right hand  Upper Extremity Range of Motion:  WFL  Upper Extremity Strength: WFL   Strength: WFL  Fine Motor Coordination:    -       Minimally impaired (unable to take toothbrush out of package and tie/untie gown)    [facility] 6 Click ADL:  [facility] Total Score: 19    Treatment & Education:  Education provided appropriate and non appropriate DME for his functional level.   Education:    Patient left HOB elevated with all lines intact, call button in reach and RN notified    GOALS:   Multidisciplinary Problems     Occupational Therapy Goals        Problem: Occupational Therapy Goal    Goal Priority Disciplines Outcome Interventions   Occupational Therapy Goal     OT, PT/OT     Description:  Goals to be met by: 7/23/2019    Patient will increase functional independence with ADLs by performing:    G/H while standing at sink with SBA  LE Dressing with CGA while seated  Toileting with SBA  Step transfers with SBA                        History:     Past Medical History:   Diagnosis Date    Amputation stump pain 9/10/2013    Aspiration pneumonia 7/27/2015    Asterixis 11/8/2016    C. difficile colitis 8/7/2015    Cholelithiasis without obstruction 8/25/2015    Chronic diastolic heart failure     2-23-17   1 - Low normal to mildly depressed left ventricular systolic function (EF 50-55%).    2 - Right ventricular enlargement with mildly depressed systolic function.    3 - Left ventricular diastolic dysfunction.    4 - Right atrial enlargement.    5 - Severe tricuspid regurgitation.    6 - Pulmonary hypertension. The estimated PA systolic pressure is 86 mmHg.    7 - Increased central venous pressure.    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    Metabolic acidosis, IAG, reduced excretion of inorganic acids     Myoclonic jerking 9/20/2016    Noncompliance with medication regimen 12/4/2018    Secondary hyperparathyroidism (of renal origin)     Secondary pulmonary hypertension 3/23/2017    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    TB lung, latent 08/25/2015    Negative Quantiferon Gold 3-23-17         Past Surgical History:   Procedure Laterality Date    AMPUTATION, BELOW KNEE Left 12/18/2013    Performed by Elgin Houston MD at [facility] OR 1ST FLR    COLONOSCOPY      COLONOSCOPY N/A 4/4/2017    Performed by Walekr Stern [facility] at [facility] (2ND FLR)    EGD (ESOPHAGOGASTRODUODENOSCOPY) N/A 3/7/2019    Performed by Man Galicia MD at [facility] ENDO (2ND FLR)    EGD (ESOPHAGOGASTRODUODENOSCOPY) N/A 6/12/2018    Performed by Man Galicia MD at [facility] ENDO (2ND FLR)    ESOPHAGOGASTRODUODENOSCOPY (EGD) N/A 5/22/2018    Performed by Ke Sparks MD at [facility] ENDO (2ND FLR)    ESOPHAGOGASTRODUODENOSCOPY (EGD) N/A 3/16/2018    Performed by Kevin De La Paz MD at [facility] ENDO (2ND FLR)    ESOPHAGOGASTRODUODENOSCOPY (EGD) N/A 3/8/2018    Performed by Man Galicia MD at [facility] ENDO (2ND FLR)    ESOPHAGOGASTRODUODENOSCOPY (EGD) N/A 4/4/2017    Performed by Walker Stern MD at [facility] ENDO (2ND FLR)    ESOPHAGOGASTRODUODENOSCOPY (EGD) N/A 10/17/2014    Performed by Man Galicia MD  at [facility] ENDO (2ND FLR)    FISTULOGRAM Right 9/18/2014    Performed by Grayson Hubbard MD at [facility] CATH LAB    FOOT AMPUTATION THROUGH METATARSAL      left foot    LEG AMPUTATION THROUGH KNEE  12/18/2013    left BKA    R AVF  9/12/12    UPPER GASTROINTESTINAL ENDOSCOPY         Time Tracking:     OT Date of Treatment: 06/23/19  OT Start Time: 1358  OT Stop Time: 1440  OT Total Time (min): 42 min    Billable Minutes:Evaluation 25  Self Care/Home Management 17    RANDELL Nino  6/23/2019     I certify that I was present in the room directing the student in service delivery and guiding them using my skilled judgment. As the co-signing therapist I have reviewed the students documentation and am responsible for the treatment, assessment, and plan.     WILBUR Ly, 6/23/2019

## 2019-06-23 NOTE — ASSESSMENT & PLAN NOTE
-Mr. Retana was admitted to inpatient status in the ICU and stepped down on 6/22  -Prior to admit he had bloody emesis which has occurred on prior occasions as well.  -Recent EGD with erosive gastritis, chronic bleed.   -Baseline Hb 9-12.  Admit H/H above this and it is still within this range  -No further episodes of vomiting  -Seen by Dr. Tian with GI and we have discussed the case.  -No endoscopy at this time.  -Continue daily cbc  -Continue bid ppi  -Continue diet.

## 2019-06-23 NOTE — PLAN OF CARE
Problem: Adult Inpatient Plan of Care  Goal: Absence of Hospital-Acquired Illness or Injury    Intervention: Prevent Skin Injury  Frequent weight shift encouraged. Assisted with repositioning. Right heel floated off bed.

## 2019-06-23 NOTE — ASSESSMENT & PLAN NOTE
-At home is on dialysis MWF dialysis patient  -Received dialysis yesterday with hypotension  -Plan to repeat dialysis tomorrow and if stable discharge home after this  -Appreciate input from Dr. Michele

## 2019-06-23 NOTE — PLAN OF CARE
Problem: Adult Inpatient Plan of Care  Goal: Plan of Care Review  Outcome: Ongoing (interventions implemented as appropriate)  Bed in low and locked position and able to reposition per self and remains free of injury during shift.  Purposeful rounding performed during shift.

## 2019-06-23 NOTE — PLAN OF CARE
Problem: Adult Inpatient Plan of Care  Goal: Plan of Care Review  Outcome: Ongoing (interventions implemented as appropriate)  POC reviewed with pt. Verbalized understanding. Bed in lowest position and locked. Call light within reach. IV flushed. No c/o pain. Instructed to call for further needs.

## 2019-06-23 NOTE — PLAN OF CARE
Problem: Occupational Therapy Goal  Goal: Occupational Therapy Goal  Goals to be met by: 7/23/2019    Patient will increase functional independence with ADLs by performing:    G/H while standing at sink with SBA  LE Dressing with CGA  Toileting with hygiene on toilet with SBA  Step transfers with SBA       Outcome: Ongoing (interventions implemented as appropriate)  Evaluation completed and treatment initiated. Pt evaluated from bed level due to low blood pressure (supine 121/70; sitting /65; supine after activity 89/65). He completed G/H and UBD with Min A and LBD (prosthesis and sock) with SBA EOB. Pt presents with cognitive delay (could not recall facility he lives in, the day/date/year).     Alba Daniel, SOT  6/23/2019

## 2019-06-23 NOTE — PLAN OF CARE
06/23/19 1619   Discharge Assessment   Assessment Type Discharge Planning Assessment   Confirmed/corrected address and phone number on facesheet? Yes   Assessment information obtained from? Patient   Communicated expected length of stay with patient/caregiver yes   Prior to hospitilization cognitive status: Alert/Oriented   Prior to hospitalization functional status: Assistive Equipment   Current cognitive status: Alert/Oriented   Current Functional Status: Assistive Equipment   Lives With facility resident  (jalil curran )   Able to Return to Prior Arrangements yes   Is patient able to care for self after discharge? Yes   Readmission Within the Last 30 Days no previous admission in last 30 days   Patient currently being followed by outpatient case management? Yes   Patient currently receives any other outside agency services? Yes   Is it the patient/care giver preference to resume care with the current outside agency? Yes   Equipment Currently Used at Home prosthesis   Do you have any problems affording any of your prescribed medications? TBD   Is the patient taking medications as prescribed? yes   Does the patient have transportation home? Yes   Does the patient receive services at [facility]? Yes   Discharge Plan A Return to nursing home   Discharge Plan B Return to Nursing Home   DME Needed Upon Discharge  none        RNCM met with patient at the bedside.      Patient is alert and oriented with no communication barriers.      Prior to admission patient was independent with ADLs with prostheses . Patient denies the use of HH or DME .lives @ [facility]   . Pt stated he thinking  About changing nhome - sister called - he por historian - pending call back form sister Patients PCP is correct on the face sheet. Patient choice pharmacy is correct      Patient denies a history of mental illness.         Patients family will transport him home at discharge.         No CM needs  identified at this time.       CM team will continue to follow.

## 2019-06-23 NOTE — ASSESSMENT & PLAN NOTE
-Hypotensive with dialysis yesterday and once again overnight  -Will give additional small fluid bolus today.  -No evidence of active bleeding or sepsis  -Keep overnight to ensure stable blood pressure.

## 2019-06-24 VITALS
HEIGHT: 66 IN | RESPIRATION RATE: 18 BRPM | HEART RATE: 82 BPM | SYSTOLIC BLOOD PRESSURE: 124 MMHG | DIASTOLIC BLOOD PRESSURE: 70 MMHG | WEIGHT: 140.63 LBS | BODY MASS INDEX: 22.6 KG/M2 | OXYGEN SATURATION: 98 % | TEMPERATURE: 98 F

## 2019-06-24 LAB
ALBUMIN SERPL BCP-MCNC: 3.3 G/DL (ref 3.5–5.2)
ALP SERPL-CCNC: 261 U/L (ref 55–135)
ALT SERPL W/O P-5'-P-CCNC: 19 U/L (ref 10–44)
ANION GAP SERPL CALC-SCNC: 13 MMOL/L (ref 8–16)
AST SERPL-CCNC: 24 U/L (ref 10–40)
BASOPHILS # BLD AUTO: 0.02 K/UL (ref 0–0.2)
BASOPHILS NFR BLD: 0.3 % (ref 0–1.9)
BILIRUB SERPL-MCNC: 0.5 MG/DL (ref 0.1–1)
BUN SERPL-MCNC: 32 MG/DL (ref 6–20)
CALCIUM SERPL-MCNC: 10.1 MG/DL (ref 8.7–10.5)
CHLORIDE SERPL-SCNC: 100 MMOL/L (ref 95–110)
CO2 SERPL-SCNC: 26 MMOL/L (ref 23–29)
CREAT SERPL-MCNC: 8 MG/DL (ref 0.5–1.4)
DIFFERENTIAL METHOD: ABNORMAL
EOSINOPHIL # BLD AUTO: 0.6 K/UL (ref 0–0.5)
EOSINOPHIL NFR BLD: 10.1 % (ref 0–8)
ERYTHROCYTE [DISTWIDTH] IN BLOOD BY AUTOMATED COUNT: 13.7 % (ref 11.5–14.5)
EST. GFR  (AFRICAN AMERICAN): 8 ML/MIN/1.73 M^2
EST. GFR  (NON AFRICAN AMERICAN): 7 ML/MIN/1.73 M^2
GLUCOSE SERPL-MCNC: 85 MG/DL (ref 70–110)
HAV IGM SERPL QL IA: NEGATIVE
HBV CORE IGM SERPL QL IA: NEGATIVE
HBV SURFACE AG SERPL QL IA: NEGATIVE
HCT VFR BLD AUTO: 35 % (ref 40–54)
HCV AB SERPL QL IA: POSITIVE
HGB BLD-MCNC: 11.8 G/DL (ref 14–18)
LYMPHOCYTES # BLD AUTO: 2 K/UL (ref 1–4.8)
LYMPHOCYTES NFR BLD: 35 % (ref 18–48)
MAGNESIUM SERPL-MCNC: 1.9 MG/DL (ref 1.6–2.6)
MCH RBC QN AUTO: 31.6 PG (ref 27–31)
MCHC RBC AUTO-ENTMCNC: 33.7 G/DL (ref 32–36)
MCV RBC AUTO: 94 FL (ref 82–98)
MONOCYTES # BLD AUTO: 0.6 K/UL (ref 0.3–1)
MONOCYTES NFR BLD: 9.8 % (ref 4–15)
NEUTROPHILS # BLD AUTO: 2.6 K/UL (ref 1.8–7.7)
NEUTROPHILS NFR BLD: 44.8 % (ref 38–73)
PLATELET # BLD AUTO: 160 K/UL (ref 150–350)
PMV BLD AUTO: 11.4 FL (ref 9.2–12.9)
POCT GLUCOSE: 138 MG/DL (ref 70–110)
POCT GLUCOSE: 92 MG/DL (ref 70–110)
POTASSIUM SERPL-SCNC: 3.6 MMOL/L (ref 3.5–5.1)
PROT SERPL-MCNC: 8 G/DL (ref 6–8.4)
RBC # BLD AUTO: 3.73 M/UL (ref 4.6–6.2)
SODIUM SERPL-SCNC: 139 MMOL/L (ref 136–145)
WBC # BLD AUTO: 5.72 K/UL (ref 3.9–12.7)

## 2019-06-24 PROCEDURE — 36415 COLL VENOUS BLD VENIPUNCTURE: CPT

## 2019-06-24 PROCEDURE — 97530 THERAPEUTIC ACTIVITIES: CPT

## 2019-06-24 PROCEDURE — 25000003 PHARM REV CODE 250: Performed by: HOSPITALIST

## 2019-06-24 PROCEDURE — 99239 HOSP IP/OBS DSCHRG MGMT >30: CPT | Mod: ,,, | Performed by: HOSPITALIST

## 2019-06-24 PROCEDURE — 80100016 HC MAINTENANCE HEMODIALYSIS

## 2019-06-24 PROCEDURE — 83735 ASSAY OF MAGNESIUM: CPT

## 2019-06-24 PROCEDURE — 85025 COMPLETE CBC W/AUTO DIFF WBC: CPT

## 2019-06-24 PROCEDURE — 99239 PR HOSPITAL DISCHARGE DAY,>30 MIN: ICD-10-PCS | Mod: ,,, | Performed by: HOSPITALIST

## 2019-06-24 PROCEDURE — 97162 PT EVAL MOD COMPLEX 30 MIN: CPT

## 2019-06-24 PROCEDURE — 80053 COMPREHEN METABOLIC PANEL: CPT

## 2019-06-24 RX ADMIN — PANTOPRAZOLE SODIUM 40 MG: 40 TABLET, DELAYED RELEASE ORAL at 05:06

## 2019-06-24 RX ADMIN — CARVEDILOL 25 MG: 12.5 TABLET, FILM COATED ORAL at 05:06

## 2019-06-24 RX ADMIN — NEPHROCAP 1 CAPSULE: 1 CAP ORAL at 05:06

## 2019-06-24 RX ADMIN — SUCRALFATE 0.5 G: 1 SUSPENSION ORAL at 05:06

## 2019-06-24 NOTE — PLAN OF CARE
Problem: Physical Therapy Goal  Goal: Physical Therapy Goal  Outcome: Outcome(s) achieved Date Met: 06/24/19  Patient evaluated and no acute care goals identified. Patient uses manual W/C for mobility and transferred bed<>W/C with Kvng progressing to CGA during session. Propelled W/C with (I) x30 ft with 2x 180 deg turns. Pt reports feeling a little weaker but denies need for further PT services. Rec for d/c to previous placement with ongoing PT/OT therapies. Patient expresses interest in power chair and educated pt need to follow up with PCP as hospital DME does not supply power chairs.

## 2019-06-24 NOTE — PLAN OF CARE
[facility]   Department of Hospital Medicine  [address] [place] [id number]  [phone] (phone)  [phone] (fax)      Facility Transfer Orders                        06/24/2019    Vaughn Retana    Admit to: Return to assisted NH    Diagnoses:  Active Hospital Problems    Diagnosis  POA    *Chronic upper GI bleeding [K92.2]  Yes    Hemodialysis-associated hypotension [I95.3]  Yes    Thrombocytopenia [D69.6]  No    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, without long-term current use of insulin [E11.29]  Yes     Chronic    Renovascular hypertension [I15.0]  Yes     Chronic    Hypokalemia [E87.6]  Yes    Dyslipidemia [E78.5]  Yes     Chronic    ESRD on hemodialysis [N18.6, Z99.2]  Not Applicable     Chronic     MWF at [facility]        Resolved Hospital Problems    Diagnosis Date Resolved POA    Portal hypertension [K76.6] 06/23/2019 Yes       Allergies:  Review of patient's allergies indicates:   Allergen Reactions    Fosrenol [lanthanum] Nausea And Vomiting     Nausea and vomiting       Vitals:  Every shift      Diet: Consistent Carb: Renal diet     Supplement:  1 can every three times a day with meals                         Type:     Nepro      Activity:    - Up in a chair each morning as tolerated   - Ambulate with assistance to bathroom    Nursing Precautions:    - Fall precautions   - Decubitus precautions:   - Pressure reducing foam mattress    Hemodialysis:   MWF as managed by Dr. Michele    CONSULTS:      PT to evaluate and treat - for restorative therapy   OT to evaluate and treat - for restorative therapy   Nutrition to evaluate and recommend diet      Medications:    Vaughn Retana   Home Medication Instructions ANABEL:[id number]    Printed on:06/24/19 1122   Medication Information                      atorvastatin (LIPITOR) 80 MG tablet  Take 1 tablet (80 mg total) by mouth every evening.             carvedilol (COREG)  25 MG tablet  Take 1 tablet (25 mg total) by mouth 2 (two) times daily with meals.  Hold for SBP less than 110             dicyclomine (BENTYL) 10 MG capsule  Take 1 capsule (10 mg total) by mouth before meals as needed (TID PRN). For belching             ondansetron (ZOFRAN) 8 MG tablet  Take 1 tablet (8 mg total) by mouth every 8 (eight) hours as needed for Nausea.             pantoprazole (PROTONIX) 40 MG tablet  Take 1 tablet (40 mg total) by mouth 2 (two) times daily before meals.             RENAPLEX-D 800 mcg-12.5 mg -2,000 unit Tab  Take 1 tablet by mouth once daily.             sevelamer carbonate (RENVELA) 800 mg Tab  TAKE 2 TABLETS BY MOUTH THREE TIMES A DAY WITH MEALS             sucralfate (CARAFATE) 100 mg/mL suspension  Take 5 mLs (500 mg total) by mouth 2 (two) times daily.                 _________________________________  Dr. Marker  06/24/2019

## 2019-06-24 NOTE — ASSESSMENT & PLAN NOTE
-Mr. Retana was admitted to inpatient status in the ICU and stepped down on 6/22  -Prior to admit he had bloody emesis which has occurred on prior occasions as well.  He had no episodes during his admission.  -Recent EGD with erosive gastritis, chronic bleed.   -Baseline Hb 9-12.  Admit H/H above this and it is still within this range  -No further episodes of vomiting  -Seen by Dr. Tian with GI and we have discussed the case.  -No endoscopy at this time.  -Continue bid ppi  -Continue diet.  -Follow up with GI as outpatient for ongoing monitoring.

## 2019-06-24 NOTE — PLAN OF CARE
Orders and clinicals sent to [facility] via [employer].   Spoke with Admissions at [facility] to notify of pt discharge today ( Agnes [phone]).    Pt can return to room 423B.  Report to be called to RN on 4th floor ([phone]).    Spoke with pt in HD.  Pt scheduled to complete dialysis treatment at 1600 today, then be discharged.      Pt states he does not like it at [facility] would prefer to return to living with his sister.  Pt has not been in touch with his sister and he is unsure if his sister would take him back.  Informed pt that as a resident at [facility], they have control of his check and the process of changing his living arrangement would have to be completed through the SW at [facility].  All questions answered, pt verbalizes understanding.      CM to arrange transport back to [facility] after pt completes HD today.

## 2019-06-24 NOTE — DISCHARGE SUMMARY
[facility] Hospital Medicine  Discharge Summary      Patient Name: Vaughn Retana  MRN: [id number]  Admission Date: 6/21/2019  Hospital Length of Stay: 3 days  Discharge Date and Time:  06/24/2019 11:26 AM  Attending Physician: Anitha Whitfield MD   Discharging Provider: Anitha Whitfield MD  Primary Care Provider: Yvette Zelaya NP      HPI:   The patient is a 54 y.o. male who presents with complaint of vomiting dark blood since yesterday. He denies abdominal pain, current nausea, or blood in stool. He was last dialyzed today and received full treatment. He has not followed up with GI since he was hospitalized with intractable vomiting three months ago. He is compliant with Protonix.  While in ER, patient had multiple large coffee ground emesis.      Consults:   Consults (From admission, onward)        Status Ordering Provider     Inpatient consult to Gastroenterology  Once     Provider:  Fredy Tian MD    Completed ALEXANDRIA FRAZIER     Inpatient consult to Nephrology  Once     Provider:  Omkar Lopez MD    Completed ANITHA WHITFIELD        Hospital Course By Problem:   * Chronic upper GI bleeding  -Mr. Retana was admitted to inpatient status in the ICU and stepped down on 6/22  -Prior to admit he had bloody emesis which has occurred on prior occasions as well.  He had no episodes during his admission.  -Recent EGD with erosive gastritis, chronic bleed.   -Baseline Hb 9-12.  Admit H/H above this and it is still within this range  -No further episodes of vomiting  -Seen by Dr. Tian with GI and we have discussed the case.  -No endoscopy at this time.  -Continue bid ppi  -Continue diet.  -Follow up with GI as outpatient for ongoing monitoring.    Hemodialysis-associated hypotension  -Hypotensive with dialysis on 6/22 and once briefly on 6/23  -Improved with small fluid bolus and stable since.  -Holding home norvasc and continuing home coreg with hold parameters for SBP <  110.  -No evidence of active bleeding or sepsis  -Plan discharge this afternoon after dialysis    ESRD on hemodialysis  -At home is on dialysis MWF dialysis patient  -Received dialysis while inpatient  -Plan discharge after dialysis today.  -Appreciate input from Dr. Michele    Thrombocytopenia  -Platelets normal on admit and mildly low on 6/23.    -Could be due to consumption from bleeding/clotting  -Platelets normal on day of discharge      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, without long-term current use of insulin  -A1c 4.7  -Accu checks qac and hs   -Diet controlled at home  -Diabetic diet.          Renovascular hypertension  -Have stopped norvasc and placed hold parameters on coreg  -BP normal at discharge    Hypokalemia  -K normal on day of discharge.    Dyslipidemia  -Continue statin      Final Active Diagnoses:    Diagnosis Date Noted POA    PRINCIPAL PROBLEM:  Chronic upper GI bleeding [K92.2] 05/21/2018 Yes    Hemodialysis-associated hypotension [I95.3]  Yes    ESRD on hemodialysis [N18.6, Z99.2] 02/07/2013 Not Applicable     Chronic    Thrombocytopenia [D69.6] 06/22/2019 No    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, without long-term current use of insulin [E11.29] 05/21/2018 Yes     Chronic    Renovascular hypertension [I15.0] 03/16/2018 Yes     Chronic    Hypokalemia [E87.6] 03/08/2018 Yes    Dyslipidemia [E78.5] 02/07/2013 Yes     Chronic      Problems Resolved During this Admission:    Diagnosis Date Noted Date Resolved POA    Portal hypertension [K76.6]  06/23/2019 Yes       Discharged Condition: fair    Disposition: Long Term Care    Follow Up:  Follow-up Information     Yvette Zelaya NP In 1 week.    Specialty:  Family Medicine  Contact information:  [address] [facility] [id number] [phone]             Dialysis on Monday, Wednesday and Friday.           Fredy iTan MD In 2 weeks.    Specialty:   Gastroenterology  Contact information:  [address]  [address]  [facility] [address]  [phone]                 Patient Instructions:      Diet renal     Diet diabetic     Notify your health care provider if you experience any of the following:  increased confusion or weakness     Notify your health care provider if you experience any of the following:  persistent dizziness, light-headedness, or visual disturbances     Notify your health care provider if you experience any of the following:  worsening rash     Notify your health care provider if you experience any of the following:  severe persistent headache     Notify your health care provider if you experience any of the following:  difficulty breathing or increased cough     Notify your health care provider if you experience any of the following:  severe uncontrolled pain     Notify your health care provider if you experience any of the following:  persistent nausea and vomiting or diarrhea     Notify your health care provider if you experience any of the following:  temperature >100.4     Activity as tolerated       Significant Diagnostic Studies: Labs:   BMP:   Recent Labs   Lab 06/23/19  0523 06/24/19  0503   GLU 78 85    139   K 3.3* 3.6   CL 99 100   CO2 29 26   BUN 15 32*   CREATININE 5.3* 8.0*   CALCIUM 10.2 10.1   MG 1.9 1.9   , CMP   Recent Labs   Lab 06/23/19  0523 06/24/19  0503    139   K 3.3* 3.6   CL 99 100   CO2 29 26   GLU 78 85   BUN 15 32*   CREATININE 5.3* 8.0*   CALCIUM 10.2 10.1   PROT 8.7* 8.0   ALBUMIN 3.5 3.3*   BILITOT 0.5 0.5   ALKPHOS 288* 261*   AST 28 24   ALT 22 19   ANIONGAP 14 13   ESTGFRAFRICA 13* 8*   EGFRNONAA 11* 7*   , CBC   Recent Labs   Lab 06/22/19  2148 06/23/19  0810 06/24/19  0503   WBC 6.22 6.72 5.72   HGB 13.2* 12.8* 11.8*   HCT 39.0* 38.2* 35.0*    143* 160    and A1C:   Recent Labs   Lab 06/22/19  0017   HGBA1C 4.7  4.7       Pending Diagnostic Studies:     None          Medications:  Reconciled Home Medications:      Medication List      CONTINUE taking these medications    atorvastatin 80 MG tablet  Commonly known as:  LIPITOR  Take 1 tablet (80 mg total) by mouth every evening.     carvedilol 25 MG tablet  Commonly known as:  COREG  Take 1 tablet (25 mg total) by mouth 2 (two) times daily with meals.     dicyclomine 10 MG capsule  Commonly known as:  BENTYL  Take 1 capsule (10 mg total) by mouth before meals as needed (TID PRN). For belching     ondansetron 8 MG tablet  Commonly known as:  ZOFRAN  Take 1 tablet (8 mg total) by mouth every 8 (eight) hours as needed for Nausea.     pantoprazole 40 MG tablet  Commonly known as:  PROTONIX  Take 1 tablet (40 mg total) by mouth 2 (two) times daily before meals.     RENAPLEX-D 800 mcg-12.5 mg -2,000 unit Tab  Generic drug:  vit B,C-FA-zinc-selen-vit D3-E  Take 1 tablet by mouth once daily.     sevelamer carbonate 800 mg Tab  Commonly known as:  RENVELA  TAKE 2 TABLETS BY MOUTH THREE TIMES A DAY WITH MEALS     sucralfate 100 mg/mL suspension  Commonly known as:  CARAFATE  Take 5 mLs (500 mg total) by mouth 2 (two) times daily.        STOP taking these medications    amLODIPine 10 MG tablet  Commonly known as:  NORVASC            Indwelling Lines/Drains at time of discharge:   Lines/Drains/Airways     Drain                 Hemodialysis AV Fistula Right upper arm -- days                Time spent on the discharge of patient: 35 minutes  Patient was seen and examined on the date of discharge and determined to be suitable for discharge.         Lucho Whitfield MD  Department of Hospital Medicine  [facility]

## 2019-06-24 NOTE — PROGRESS NOTES
[facility] Hemodialysis Procedure Note    Subjective:  The patient is currently doing well on hemodialysis.  He states that he feels fine, denies pain or shortness of breath.  Otherwise denies palpitations, abd pain, n/v/d, LE swelling.  Appetite fine and he is currently hungry.  Denies fatigue.    Vitals reviewed, and are currently stable.      GEN:  appears well, comfortably lying in bed, in no acute distress  HEENT/NECK:  anicteric sclera, MMM, no apparent JVD  CVS:  RRR with 2/6 systolic murmur, no rubs  PULM:  CTAB  ABD:  soft, nontender, nondistended, normal bowel sounds  EXTR:  no edema  ACCESS:  RUE AVF, currently being accessed    BFR: 400ml/min  DFR: 800ml/min  Bath: 3K/2.5Ca  UF Goal: 2L as tolerated  Meds: none    All recent labs have been reviewed and the dialysis bath has been adjusted accordingly.    Hemodynamic monitoring ongoing while on hemodialysis.      Tu Seymour MD

## 2019-06-24 NOTE — ASSESSMENT & PLAN NOTE
-Platelets normal on admit and mildly low on 6/23.    -Could be due to consumption from bleeding/clotting  -Platelets normal on day of discharge

## 2019-06-24 NOTE — PLAN OF CARE
Pt to return to [facility]: [address] [place] [address].  Room 423B.    Bedside RN to call report to 4th floor Nurse: Jose [phone]    ADT 30 placed, WC van to arrive by 6pm.  Awaiting pt to finish HD.   For transport issues or to change time of pickup please call Patient flow center  [phone].     Pt informed of transfer today, all questions answered, no further DC needs.       06/24/19 1611   Final Note   Assessment Type Final Discharge Note   Anticipated Discharge Disposition [facility] Follow Up  Appt(s) scheduled? Yes   Discharge plans and expectations educations in teach back method with documentation complete? Yes   Right Care Referral Info   Post Acute Recommendation Other

## 2019-06-24 NOTE — ASSESSMENT & PLAN NOTE
-Hypotensive with dialysis on 6/22 and once briefly on 6/23  -Improved with small fluid bolus and stable since.  -Holding home norvasc and continuing home coreg with hold parameters for SBP < 110.  -No evidence of active bleeding or sepsis  -Plan discharge this afternoon after dialysis

## 2019-06-24 NOTE — ASSESSMENT & PLAN NOTE
-At home is on dialysis MWF dialysis patient  -Received dialysis while inpatient  -Plan discharge after dialysis today.  -Appreciate input from Dr. Michele

## 2019-06-24 NOTE — PLAN OF CARE
06/24/19 1258   Discharge Reassessment   Assessment Type Discharge Planning Reassessment   Provided patient/caregiver education on the expected discharge date and the discharge plan Yes   Do you have any problems affording any of your prescribed medications? No   Discharge Plan A Return to nursing home   DME Needed Upon Discharge  none   Anticipated Discharge Disposition nursing home [facility]   Can the patient answer the patient profile reliably? Yes, cognitively intact   How does the patient rate their overall health at the present time? Good   Describe the patient's ability to walk at the present time. Walks with the help of equipment

## 2019-06-24 NOTE — PROGRESS NOTES
Report called to ISMAEL Asencio (PRN) at [facility] ([phone]) at 5:25 p.m.  Elif took report for Donn, 4th floor.  AVS printed and given to [facility].  Pt's IV removed.  Facesheet from  given to [facility] .

## 2019-06-24 NOTE — PT/OT/SLP EVAL
"Physical Therapy Evaluation, Treatment and Discharge Note    Patient Name:  Vaughn Retana   MRN:  [id number]    Recommendations:     Discharge Recommendations:  nursing facility, basic(return to previous placement)   Discharge Equipment Recommendations: walker, rolling(RW if goal to gait train with prosthesis)   Barriers to discharge: None    Assessment:     Vaughn Retana is a 54 y.o. male admitted with a medical diagnosis of Chronic upper GI bleeding. .  At this time, patient is functioning at their prior level of function and does not require further acute PT services.     Recent Surgery: * No surgery found *      Plan:     During this hospitalization, patient does not require further acute PT services.  Please re-consult if situation changes.      Subjective     Chief Complaint: BLE pain, does not specify area of leg  Patient/Family Comments/goals: To get a power chair; edu pt he would need to follow up with PCP  Pain/Comfort:  · Pain Rating 1: 8/10  · Location - Side 1: Bilateral  · Location - Orientation 1: generalized  · Location 1: leg  · Pain Addressed 1: Reposition, Distraction, Cessation of Activity  · Pain Rating Post-Intervention 1: 9/10    Patients cultural, spiritual, Bahai conflicts given the current situation: no    Per OT eval and confirmed by pt:  "Living Environment: Pt lives in a residential facility with a roommate (couldn't recall name) with supervisors to check on them. Confirmed with case mgmt--[facility].  Previous level of function: Ambulatory with prosthesis (skill level unknown) without AD however, propels himself in a wheelchair most of the time. Leaves facility to buy groceries. Receives [facility]V for self care.  Roles and Routines: Enjoys fishing  Equipment Used at Home:  prosthesis, W/C  Assistance upon Discharge: Aides from [facility]."    Objective:     Communicated with ISMAEL Mcgrath prior to session.  Patient found left sidelying with telemetry, peripheral IV, bed alarm upon " PT entry to room. Patient agreeable to evaluation.    General Precautions: Standard, fall   Orthopedic Precautions:N/A(L BKA, prosthesis present in room)   Braces: (L prosthesis)     Patient donned yellow sock and gait belt for OOB mobility.    Exams:  · Cognition:   · Patient is oriented to person, , place, situation, month/year (not date).  · Pt follows approximately 100% of one step commands.   · Mood: Pleasant and cooperative.   · Musculoskeletal:  · Posture:    · Rounded shoulders  · Forward head  · LE ROM/Strength:   · R ROM: Limited hamstring length (unable to extend knee sitting at EOB)  · L ROM: L BKA, limited knee flexion  · R Strength:   · Hip flexion: 3/5  · Knee extension: 3/5  · Dorsiflexion: 4/5   · L Strength: WFL (L BKA)  · Neuromuscular:  · Coordination/Tone/Reflexes: No impairments identified with functional mobility. No formal testing performed.   · Balance:   · Sitting: SBA, use of L residual limb for bracing when seated EOB  · Standing: Maintains BUE support on bed and/or W/C  · Visual-vestibular: No impairments identified with functional mobility. No formal testing performed.  · Integument: Visible skin intact  · Cardiopulmonary:  · Edema: None noted         Functional Mobility:  · Bed Mobility:     · Supine to Sit: supervision, use of hospital bed features  · Sit to Supine: supervision  · Transfers:     · Bed to Chair: contact guard assistance and minimum assistance with  no AD and hand-held assist  using  Squat Pivot  · Positions chair directly in front prior to transfer to be able to maintain LE and BUE contact on bed or chair during transfer  · Initial provided Kvng for safety, pt performed with CGA when returning to bed  · Wheelchair Propulsion:  Pt propelled Standard wheelchair x 30 feet on Level tile with  Bilateral upper extremity and Right lower extremity with independence.  · Performed 2x180 deg turns in narrow space  · Avoided obstacles without VCs    AM-PAC 6 CLICK  MOBILITY  Total Score:16       Therapeutic Activities and Exercises:  ·  PT educated patient re: PT plan of care, role of PT, safety with OOB mobility, use of W/C, transfer technique, discharge disposition.  Pt verbalize understanding.      AM-PAC 6 CLICK MOBILITY  Total Score:16     Patient left HOB elevated with all lines intact, call button in reach, bed alarm on and RN Ramila notified.    GOALS:   Multidisciplinary Problems     Physical Therapy Goals     Not on file          Multidisciplinary Problems (Resolved)        Problem: Physical Therapy Goal    Goal Priority Disciplines Outcome Goal Variances Interventions   Physical Therapy Goal   (Resolved)     PT, PT/OT Outcome(s) achieved                     History:     Past Medical History:   Diagnosis Date    Amputation stump pain 9/10/2013    Aspiration pneumonia 7/27/2015    Asterixis 11/8/2016    C. difficile colitis 8/7/2015    Cholelithiasis without obstruction 8/25/2015    Chronic diastolic heart failure     2-23-17   1 - Low normal to mildly depressed left ventricular systolic function (EF 50-55%).    2 - Right ventricular enlargement with mildly depressed systolic function.    3 - Left ventricular diastolic dysfunction.    4 - Right atrial enlargement.    5 - Severe tricuspid regurgitation.    6 - Pulmonary hypertension. The estimated PA systolic pressure is 86 mmHg.    7 - Increased central venous pressure.    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    Metabolic acidosis, IAG, reduced excretion of inorganic acids     Myoclonic jerking 9/20/2016    Noncompliance with medication regimen 12/4/2018    Secondary hyperparathyroidism (of renal origin)     Secondary pulmonary hypertension 3/23/2017    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    TB lung, latent 08/25/2015    Negative Quantiferon Gold 3-23-17       Past Surgical History:   Procedure Laterality Date    AMPUTATION, BELOW KNEE Left 12/18/2013    Performed by Elgin Houston MD at [facility] OR 1ST FLR    COLONOSCOPY      COLONOSCOPY N/A 4/4/2017    Performed by Walker Stern MD at [facility] ENDO (2ND FLR)    EGD (ESOPHAGOGASTRODUODENOSCOPY) N/A 3/7/2019    Performed by Man Galicia MD at [facility] ENDO (2ND FLR)    EGD (ESOPHAGOGASTRODUODENOSCOPY) N/A 6/12/2018    Performed by Man Galicia MD at [facility] ENDO (2ND FLR)    ESOPHAGOGASTRODUODENOSCOPY (EGD) N/A 5/22/2018    Performed by Ke Sparks MD at [facility] ENDO (2ND FLR)    ESOPHAGOGASTRODUODENOSCOPY (EGD) N/A 3/16/2018    Performed by Kevin De La Paz MD at [facility] ENDO (2ND FLR)    ESOPHAGOGASTRODUODENOSCOPY (EGD) N/A 3/8/2018    Performed by Man Galicia MD at [facility] ENDO (2ND FLR)    ESOPHAGOGASTRODUODENOSCOPY (EGD) N/A 4/4/2017    Performed by Walker Stern MD at [facility] ENDO (2ND FLR)    ESOPHAGOGASTRODUODENOSCOPY (EGD) N/A 10/17/2014    Performed by Man Galicia MD at [facility] ENDO (2ND FLR)    FISTULOGRAM Right 9/18/2014    Performed by Grayson Hubbard MD at [facility] CATH LAB    FOOT AMPUTATION THROUGH METATARSAL      left foot    LEG AMPUTATION THROUGH KNEE  12/18/2013    left BKA    R AVF  9/12/12    UPPER GASTROINTESTINAL ENDOSCOPY         Time Tracking:     PT Received On: 06/24/19  PT Start Time: 1130     PT Stop Time: 1155  PT Total Time (min): 25 min     Billable Minutes: Evaluation 15 and Therapeutic Activity 10      Rayna Oviedo, PT  06/24/2019

## 2019-06-25 NOTE — PT/OT/SLP DISCHARGE
Occupational Therapy Discharge Summary    Vaughn Retana  MRN: [id number]   Principal Problem: Chronic upper GI bleeding      Patient Discharged from acute Occupational Therapy on 06/24/19.  Please refer to prior OT note dated 06/23/19 for functional status.    Assessment:      Patient appropriate for care in another setting.    Objective:     GOALS:   Multidisciplinary Problems     Occupational Therapy Goals        Problem: Occupational Therapy Goal    Goal Priority Disciplines Outcome Interventions   Occupational Therapy Goal     OT, PT/OT Ongoing (interventions implemented as appropriate)    Description:  Goals to be met by: 7/23/2019    Patient will increase functional independence with ADLs by performing:    G/H while standing at sink with SBA  LE Dressing with CGA  Toileting with hygiene on toilet with SBA  Step transfers with SBA                         Reasons for Discontinuation of Therapy Services  Transfer to alternate level of care.      Plan:     Patient Discharged to: Assisted Living Facility     OT of record unavailable for documentation.    WILBUR Edwards  6/25/2019

## 2019-08-19 PROBLEM — K92.0 HEMATEMESIS: Status: ACTIVE | Noted: 2019-01-01

## 2019-08-19 NOTE — HOSPITAL COURSE
Admitted to the critical care unit for airway watch due to hematemesis. He was given zofran and a one time dose of protonix 80mg. Currently being treated with protonix 40mg bid.

## 2019-08-19 NOTE — ED TRIAGE NOTES
Pt presents with c/o vomiting coffee ground emesis. Symptoms started yesterday. Pt is dialysis pt MWF.

## 2019-08-19 NOTE — SUBJECTIVE & OBJECTIVE
Past Medical History:   Diagnosis Date    Amputation stump pain 9/10/2013    Aspiration pneumonia 7/27/2015    Asterixis 11/8/2016    C. difficile colitis 8/7/2015    Cholelithiasis without obstruction 8/25/2015    Chronic diastolic heart failure     2-23-17   1 - Low normal to mildly depressed left ventricular systolic function (EF 50-55%).    2 - Right ventricular enlargement with mildly depressed systolic function.    3 - Left ventricular diastolic dysfunction.    4 - Right atrial enlargement.    5 - Severe tricuspid regurgitation.    6 - Pulmonary hypertension. The estimated PA systolic pressure is 86 mmHg.    7 - Increased central venous pressure.    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    Metabolic acidosis, IAG, reduced excretion of inorganic acids     Myoclonic jerking 9/20/2016    Noncompliance with medication regimen 12/4/2018    Secondary hyperparathyroidism (of renal origin)     Secondary pulmonary hypertension 3/23/2017    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    TB lung, latent 08/25/2015    Negative Quantiferon Gold 3-23-17       Past Surgical History:   Procedure Laterality Date    AMPUTATION, BELOW KNEE Left 12/18/2013    Performed by Elgin Houston MD at [facility] OR 1ST FLR    COLONOSCOPY      COLONOSCOPY N/A 4/4/2017    Performed by Walker Stern MD at [facility] ENDO (2ND FLR)    EGD (ESOPHAGOGASTRODUODENOSCOPY) N/A 3/7/2019    Performed by  Man Galicia MD at [facility] ENDO (2ND FLR)    EGD (ESOPHAGOGASTRODUODENOSCOPY) N/A 6/12/2018    Performed by Man Galicia MD at [facility] ENDO (2ND FLR)    ESOPHAGOGASTRODUODENOSCOPY (EGD) N/A 5/22/2018    Performed by Ke Sparks MD at [facility] ENDO (2ND FLR)    ESOPHAGOGASTRODUODENOSCOPY (EGD) N/A 3/16/2018    Performed by Kevin De La Paz MD at [facility] ENDO (2ND FLR)    ESOPHAGOGASTRODUODENOSCOPY (EGD) N/A 3/8/2018    Performed by Man Galicia MD at [facility] ENDO (2ND FLR)    ESOPHAGOGASTRODUODENOSCOPY (EGD) N/A 4/4/2017    Performed by Walker Stern MD at [facility] ENDO (2ND FLR)    ESOPHAGOGASTRODUODENOSCOPY (EGD) N/A 10/17/2014    Performed by [facility] at [facility] (2ND FLR)    FISTULOGRAM Right 9/18/2014    Performed by Grayson Hubbard MD at [facility] CATH LAB    FOOT AMPUTATION THROUGH METATARSAL      left foot    LEG AMPUTATION THROUGH KNEE  12/18/2013    left BKA    R AVF  9/12/12    UPPER GASTROINTESTINAL ENDOSCOPY         Review of patient's allergies indicates:   Allergen Reactions    Fosrenol [lanthanum] Nausea And Vomiting     Nausea and vomiting       Family History     Problem Relation (Age of Onset)    Alcohol abuse Maternal Grandmother    Diabetes Brother, Maternal Grandfather    Early death Mother    Heart disease Father    Hyperlipidemia Father    Hypertension Father, Sister    Kidney disease Father        Tobacco Use    Smoking status: Former Smoker     Packs/day: 1.00     Years: 10.00     Pack years: 10.00    Smokeless tobacco: Never Used   Substance and Sexual Activity    Alcohol use: No    Drug use: No    Sexual activity: Yes     Partners: Female     Birth control/protection: None      Review of Systems   Constitutional: Negative for activity change, appetite change, diaphoresis, fatigue and fever.   HENT: Negative for sore throat.    Eyes: Negative for visual disturbance.   Respiratory: Negative for cough, chest tightness, shortness of breath and wheezing.    Cardiovascular:  Negative for chest pain, palpitations and leg swelling.   Gastrointestinal: Positive for vomiting. Negative for abdominal distention, abdominal pain, anal bleeding, blood in stool and diarrhea.   Endocrine: Negative for cold intolerance and heat intolerance.   Genitourinary: Negative for hematuria.   Musculoskeletal: Negative for arthralgias and joint swelling.   Skin: Negative for pallor and rash.   Neurological: Positive for dizziness. Negative for weakness, light-headedness, numbness and headaches.   Psychiatric/Behavioral: Negative for hallucinations. The patient is not nervous/anxious.      Objective:     Vital Signs (Most Recent):  Temp: 97.9 °F (36.6 °C) (08/19/19 1246)  Pulse: 81 (08/19/19 1101)  Resp: 16 (08/19/19 1246)  BP: (!) 147/89 (08/19/19 1101)  SpO2: 100 % (08/19/19 1246) Vital Signs (24h Range):  Temp:  [97.9 °F (36.6 °C)-98 °F (36.7 °C)] 97.9 °F (36.6 °C)  Pulse:  [] 81  Resp:  [16-23] 16  SpO2:  [100 %] 100 %  BP: (142-174)/(85-98) 147/89   Weight: 74.8 kg (165 lb)  Body mass index is 27.46 kg/m².      Intake/Output Summary (Last 24 hours) at 8/19/2019 1530  Last data filed at 8/19/2019 0800  Gross per 24 hour   Intake --   Output 400 ml   Net -400 ml       Physical Exam   Constitutional: He is oriented to person, place, and time. He appears well-developed and well-nourished. No distress.   HENT:   Head: Normocephalic and atraumatic.   Mouth/Throat: Mucous membranes are pale and dry.   Neck: Neck supple.   Cardiovascular: Normal rate and regular rhythm.   No murmur heard.  Pulmonary/Chest: Effort normal and breath sounds normal. No respiratory distress. He has no wheezes.   Abdominal: Soft. Bowel sounds are normal. He exhibits no distension. There is no tenderness. There is no rebound and no guarding.   Musculoskeletal: He exhibits no edema or tenderness.   Left lower extremity prosthetic    Neurological: He is alert and oriented to person, place, and time.   Skin: Skin is warm and dry. No  rash noted.   Psychiatric: He has a normal mood and affect. His behavior is normal.   Nursing note and vitals reviewed.      Vents:     Lines/Drains/Airways     Drain                 Hemodialysis AV Fistula Right upper arm -- days          Peripheral Intravenous Line                 Peripheral IV - Single Lumen 08/19/19 0753 18 G Left Antecubital less than 1 day         Peripheral IV - Single Lumen 08/19/19 0824 20 G Left Hand less than 1 day              Significant Labs:    CBC/Anemia Profile:  Recent Labs   Lab 08/19/19  0756 08/19/19  0847 08/19/19  1156   WBC 7.85  --   --    HGB 9.1*  --  8.6*   HCT 28.1* 27*  --      --   --    *  --   --    RDW 14.4  --   --         Chemistries:  Recent Labs   Lab 08/19/19  0756      K 3.5   CL 95   CO2 31*   BUN 23*   CREATININE 11.0*   CALCIUM 9.7   ALBUMIN 3.7   PROT 9.0*   BILITOT 0.5   ALKPHOS 267*   ALT 14   AST 25   MG 2.1   PHOS 2.6*       Significant Imaging:   X-Ray Chest AP Portable  Narrative: EXAMINATION:  XR CHEST AP PORTABLE    CLINICAL HISTORY:  gi bleed;    TECHNIQUE:  Single frontal view of the chest was performed.    COMPARISON:  Radiograph 06/21/2019.    FINDINGS:  Cardiac monitoring leads project over the bilateral hemithoraces.  Mediastinal structures are midline.  Hilar contours are unremarkable.  Cardiac silhouette is magnified by AP technique.  Lung volumes are symmetric.  No consolidation.  No pneumothorax or pleural effusions.  No free air beneath the diaphragm.  Hypertrophic changes of the left AC joint noted.  No acute osseous abnormalities.  Vascular stent projects over the right subclavian region.  Impression: 1. No acute radiographic findings in the chest on this single view.    Electronically signed by: Jatin Duncan MD  Date:    08/19/2019  Time:    08:37

## 2019-08-19 NOTE — ED PROVIDER NOTES
Encounter Date: 8/19/2019       History     Chief Complaint   Patient presents with    Hematemesis     emesis of coffee ground since yesterday with bloody BMs as well and abd cramping. Sent from Dialysis.      54 year old male with medical history of ESRD (HD MWF), Recurrent hematemesis 2/2 esophagitis, T2DM, CHF presenting to the ED with the chief complaint of hematemesis. Patient recently discharged from [facility] for hematemesis on 8/12/2019. He had a EGD at that time that showed improving esophagitis with no active bleeding. Patient report reoccurrence of the hematemesis yesterday. He reports the episodes continued today at his dialysis appointment and was sent to the ED for further evaluation. EMS estimates about 300cc volume production of hematemesis while in transit. Initial history limited from the patient 2/2 active hematemesis.     The history is provided by the patient. The history is limited by the condition of the patient.     Review of patient's allergies indicates:   Allergen Reactions    Fosrenol [lanthanum] Nausea And Vomiting     Nausea and vomiting     Past Medical History:   Diagnosis Date    Amputation stump pain 9/10/2013    Aspiration pneumonia 7/27/2015    Asterixis 11/8/2016    C. difficile colitis 8/7/2015    Cholelithiasis without obstruction 8/25/2015    Chronic diastolic heart failure     2-23-17   1 - Low normal to mildly depressed left ventricular systolic function (EF 50-55%).    2 - Right ventricular enlargement with mildly depressed systolic function.    3 - Left ventricular diastolic dysfunction.    4 - Right atrial enlargement.    5 - Severe tricuspid regurgitation.    6 - Pulmonary hypertension. The estimated PA systolic pressure is 86 mmHg.    7 - Increased central venous pressure.    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 OR 1ST FLR    COLONOSCOPY      COLONOSCOPY N/A 4/4/2017    Performed by Walker Stern MD at [facility] ENDO (2ND FLR)    EGD (ESOPHAGOGASTRODUODENOSCOPY) N/A 3/7/2019    Performed by Man Galicia MD at [facility] ENDO (2ND FLR)    EGD (ESOPHAGOGASTRODUODENOSCOPY) N/A 6/12/2018    Performed by Man Galicia MD at [facility] ENDO (2ND FLR)    ESOPHAGOGASTRODUODENOSCOPY (EGD) N/A 5/22/2018    Performed by Ke Sparks MD at [facility] ENDO (2ND FLR)    ESOPHAGOGASTRODUODENOSCOPY (EGD) N/A 3/16/2018    Performed by Kevin De La Paz MD at [facility] ENDO (2ND FLR)    ESOPHAGOGASTRODUODENOSCOPY (EGD) N/A 3/8/2018    Performed by Man Galicia MD at [facility] ENDO (2ND FLR)    ESOPHAGOGASTRODUODENOSCOPY (EGD) N/A 4/4/2017    Performed by Walker Stern MD at [facility] ENDO (2ND FLR)    ESOPHAGOGASTRODUODENOSCOPY (EGD) N/A 10/17/2014    Performed by Man Galicia MD at [facility] ENDO (2ND FLR)    FISTULOGRAM Right 9/18/2014    Performed by Grayson Hubbard MD at [facility] CATH LAB    FOOT AMPUTATION THROUGH METATARSAL      left foot    LEG AMPUTATION THROUGH  KNEE  12/18/2013    left BKA    R AVF  9/12/12    UPPER GASTROINTESTINAL ENDOSCOPY       Family History   Problem Relation Age of Onset    Early death Mother     Kidney disease Father     Hypertension Father     Heart disease Father     Hyperlipidemia Father     Diabetes Brother     Alcohol abuse Maternal Grandmother     Diabetes Maternal Grandfather     Hypertension Sister     Melanoma Neg Hx      Social History     Tobacco Use    Smoking status: Former Smoker     Packs/day: 1.00     Years: 10.00     Pack years: 10.00    Smokeless tobacco: Never Used   Substance Use Topics    Alcohol use: No    Drug use: No     Review of Systems   Unable to perform ROS: Acuity of condition   Gastrointestinal:        +hematemesis       Physical Exam     Initial Vitals   BP Pulse Resp Temp SpO2   08/19/19 0738 08/19/19 0738 08/19/19 0738 08/19/19 0951 08/19/19 0738   (!) 142/98 102 20 97.9 °F (36.6 °C) 100 %      MAP       --                Physical Exam    Constitutional: He appears well-developed and well-nourished. He appears distressed.   Actively vomiting on exam. Dark-brown colored emesis   HENT:   Head: Normocephalic and atraumatic.   Mouth/Throat: Oropharynx is clear and moist.   Eyes: EOM are normal. Pupils are equal, round, and reactive to light.   Neck: Normal range of motion. Neck supple.   Cardiovascular: Tachycardia present.    RUE fistula +thrill. Dressing in place   Pulmonary/Chest: Breath sounds normal. No respiratory distress. He has no wheezes.   Abdominal: Soft. There is tenderness.   Musculoskeletal: Normal range of motion. He exhibits no tenderness.   Neurological: He is alert and oriented to person, place, and time.   Skin: Skin is warm and dry.       ED Course   Procedures  Labs Reviewed   CBC W/ AUTO DIFFERENTIAL - Abnormal; Notable for the following components:       Result Value    RBC 2.80 (*)     Hemoglobin 9.1 (*)     Hematocrit 28.1 (*)     Mean Corpuscular Volume 100 (*)     Mean  Corpuscular Hemoglobin 32.5 (*)     Eos # 0.7 (*)     Lymph% 17.7 (*)     Eosinophil% 9.3 (*)     All other components within normal limits   COMPREHENSIVE METABOLIC PANEL - Abnormal; Notable for the following components:    CO2 31 (*)     BUN, Bld 23 (*)     Creatinine 11.0 (*)     Total Protein 9.0 (*)     Alkaline Phosphatase 267 (*)     eGFR if  5.4 (*)     eGFR if non  4.6 (*)     All other components within normal limits   LIPASE - Abnormal; Notable for the following components:    Lipase 87 (*)     All other components within normal limits   PHOSPHORUS - Abnormal; Notable for the following components:    Phosphorus 2.6 (*)     All other components within normal limits   HEMOGLOBIN - Abnormal; Notable for the following components:    Hemoglobin 8.6 (*)     All other components within normal limits   CBC W/ AUTO DIFFERENTIAL - Abnormal; Notable for the following components:    RBC 2.07 (*)     Hemoglobin 6.8 (*)     Hematocrit 20.4 (*)     Mean Corpuscular Volume 99 (*)     Mean Corpuscular Hemoglobin 32.9 (*)     Eos # 0.6 (*)     Eosinophil% 9.9 (*)     All other components within normal limits   ISTAT PROCEDURE - Abnormal; Notable for the following components:    POC Creatinine 11.2 (*)     POC Potassium 2.8 (*)     POC TCO2 (MEASURED) 30 (*)     POC Ionized Calcium 0.97 (*)     POC Hematocrit 27 (*)     All other components within normal limits   PROTIME-INR   LACTIC ACID, PLASMA   MAGNESIUM   APTT   TYPE & SCREEN   ISTAT CHEM8   PREPARE RBC SOFT     EKG Readings: (Independently Interpreted)   Initial Reading: No STEMI. Rhythm: Sinus Tachycardia. ST Segments: Normal ST Segments.   Sinus tachycardia     ECG Results          EKG 12-lead (Final result)  Result time 08/19/19 17:26:17    Final result by Interface, Lab In [facility] (08/19/19 17:26:17)                 Narrative:    Test Reason : K92.0,    Vent. Rate : 105 BPM     Atrial Rate : 105 BPM     P-R Int : 160 ms          QRS  Dur : 102 ms      QT Int : 400 ms       P-R-T Axes : 084 -25 099 degrees     QTc Int : 528 ms    Sinus tachycardia  Abnormal ECG  When compared with ECG of 21-JUN-2019 17:13,  RSR' pattern in V1 is no longer Present  Questionable change in initial forces of Septal leads  Confirmed by GAL BURNS MD ([id number]) on 8/19/2019 5:26:07 PM    Referred By: System System           Confirmed By:GAL BURNS MD                            Imaging Results          X-Ray Chest AP Portable (Final result)  Result time 08/19/19 08:37:23    Final result by Jatin Duncan MD (08/19/19 08:37:23)                 Impression:      1. No acute radiographic findings in the chest on this single view.      Electronically signed by: Jatin Dunacn MD  Date:    08/19/2019  Time:    08:37             Narrative:    EXAMINATION:  XR CHEST AP PORTABLE    CLINICAL HISTORY:  gi bleed;    TECHNIQUE:  Single frontal view of the chest was performed.    COMPARISON:  Radiograph 06/21/2019.    FINDINGS:  Cardiac monitoring leads project over the bilateral hemithoraces.  Mediastinal structures are midline.  Hilar contours are unremarkable.  Cardiac silhouette is magnified by AP technique.  Lung volumes are symmetric.  No consolidation.  No pneumothorax or pleural effusions.  No free air beneath the diaphragm.  Hypertrophic changes of the left AC joint noted.  No acute osseous abnormalities.  Vascular stent projects over the right subclavian region.                              X-Rays:   Independently Interpreted Readings:   Chest X-Ray: Normal heart size.  No infiltrates.  No acute abnormalities.     Medical Decision Making:   History:   Old Medical Records: I decided to obtain old medical records.  Old Records Summarized: records from clinic visits and records from previous admission(s).  Independently Interpreted Test(s):   I have ordered and independently interpreted EKG Reading(s) - see summary below       <> Summary of EKG Reading(s): Sinus  tachycardia 104 bpm. No STEMI  Clinical Tests:   Lab Tests: Ordered and Reviewed  Radiological Study: Ordered and Reviewed  Medical Tests: Ordered and Reviewed  Other:   I have discussed this case with another health care provider.       <> Summary of the Discussion: Gastroenterology, MICU       APC / Resident Notes:   54 year old male with medical history of ESRD (HD MWF), Recurrent hematemesis 2/2 esophagitis, T2DM, CHF presenting to the ED c/o hematemesis for 2 days. DDx includes but not limited to GI bleed, esophagitis, PUD, esophageal rupture, symptomatic anemia, electrolyte disturbance, cardiac arrhythmia. Will obtain labs, T&S. Will give Zofran and Octreotide. Blood transfusion consent obtained.     Work-up shows H/H 9.1/28.1 (BL 11-13/25-39), Plt 251, Alk phos 267, AST/ALT 25/14, AG 15, K 3.5, Lactate 1.0  CXR without acute intrathoracic findings    Patient evaluated by MICU at bedside and they will admit the patient to their service. Hematemesis improved after Zofran and Octreotide. Gastroenterology consulted. H/H mildly below baseline and will hold off on transfusion at this time. Patient expresses understanding and agreeable to the plan. I have discussed the care of this patient with my supervising physician.          Attending Attestation:     Physician Attestation Statement for NP/PA:   I have conducted a face to face encounter with this patient in addition to the NP/PA, due to          I have reviewed and concur with the advanced practice clinician's history, physical, assessment, and plan.  I have personally interviewed and examined the patient at bedside.  See below addendum for my evaluation and additional findings.    Briefly,  this is a 54 y.o. male with a history of ESRD, esophagitis, type 2 diabetes, CHF presenting with hematemesis.  Recently discharged from [facility] for hematemesis, with EGD showing significant esophagitis with no active bleeding.  On arrival, he is having active emesis  approximately 300 mL.  Prior to this he had 1 episode of 300 mL in transit as well. He is ill appearing, actively vomiting dark brown-colored emesis.  He has right upper extremity fistula with positive thrill and dressing in place.  There is abdominal tenderness without peritoneal signs, he is tachycardic, lung sounds clear.  He does appear distressed.  Given concern for GI bleed, 2 large-bore IVs are placed, labs obtained, patient given IV Zofran for nausea, and IV octreotide for concern from esophageal source.  On re-evaluation, he has stopped actively vomiting, no more hematemesis at this time lab stable with hemoglobin of 9 hematocrit of 28.  ECG obtained with no signs of ischemia or STEMI on my read.  Chest x-ray with no free air, or acute findings on my read.  Remainder exam and labs unremarkable. IV fluids held secondary to ESRD.  Discussed case with Critical Care Medicine team, they will admit based on ongoing hematemesis and GI consult.  This case case with GI who evaluated the patient at bedside and will continue to monitor patient.  Patient agreeable to admission plan. Critical care time was spent personally by me evaluating the patient's organ dysfunction, developing treatment plan with the ICU, discussing treatment plan with patient and caregivers, discussion with all consultants, evaluation of patient's response to treatment, examination of patient, ordering performed treatments and interventions, ordering and reviewing laboratory studies, ordering and reviewing radiographic studies, and re-evaluating patient's condition.  Please see critical care note above for critical care time.    Complexity:  Critical      Fabio Taylor DO    Dept of Emergency Medicine   [facility]  Spectralink: [id number]             Clinical Impression:       ICD-10-CM ICD-9-CM   1. Hematemesis K92.0 578.0   2. ESRD on hemodialysis N18.6 585.6    Z99.2 V45.11   3. Esophagitis K20.9 530.10   4. Elevated  lipase R74.8 790.5   5. Tachycardia R00.0 785.0   6. Renovascular hypertension I15.0 405.91   7. Secondary hyperparathyroidism of renal origin N25.81 588.81   8. Acute blood loss anemia D62 285.1         Disposition:   Disposition: Admitted  Condition: Critical                        Vishal Phillips PA-C  08/19/19 1143       Fabio Taylor DO  08/21/19 0738

## 2019-08-19 NOTE — ED NOTES
Pt tolerating po fluids at this time. Awake, alert and oriented. Pt is on cardiac monitor, bp cuff and continuous pulse ox. Call light within reach, pt denies any other needs at this time. Will continue to monitor.

## 2019-08-19 NOTE — ASSESSMENT & PLAN NOTE
55yo M w/PMH of ESRD (on HD) presents with coffee ground hematemesis. Hemodynamically stable, Hgb 9.1 (b/l 11-12s).    - Recommend IV PPI bid  - Serial H/H  - Antiemetics prn. Consider scheduled zofran. If no relief, consider reglan.  - Keep on CLD today  - Keep head of bed up  - If massive hematemesis, low threshold for intubation for airway protection  - No need for octreotide as no history or concern for variceal bleed  - No need for EGD at this time as most recent one from 8/2 with only grade A esophagitis, but will continue to monitor  - Needs outpatient gastric emptying study

## 2019-08-19 NOTE — ASSESSMENT & PLAN NOTE
Currently controlled with diet with his last HgbA1c being 4.1.  Currently on a clear liquid diet.     - Advance diet as tolerated to a diabetic renal diet.   - Glucose on admit 107

## 2019-08-19 NOTE — CONSULTS
[facility]  Gastroenterology  Consult Note    Patient Name: Vaughn Retana  MRN: [id number]  Admission Date: 8/19/2019  Hospital Length of Stay: 0 days  Code Status: Prior   Attending Provider: Fabio Taylor DO   Consulting Provider: Fabio Jones MD  Primary Care Physician: Yvette Zelaya NP  Principal Problem:<principal problem not specified>    Inpatient consult to Gastroenterology  Consult performed by: Fabio Jones MD  Consult ordered by: Vishal Phillips PA-C        Subjective:     HPI:  Vaughn Retana is a 54 y.o. male who presents with complaints of acute onset coffee ground emesis with no known trigger and no aggravating/alleviating factors. Multiple episodes prior to presentation with the ED. EGD in 3/2019 showed grade D esophagitis. Also has a history of non-bleeding gastric ulcer (not seen on most recent EGDs this year). He was admitted to [facility] twice early August for same, EGD 8/2 showed grade A esophagitis with no active bleeding.  Since presentation to ED, patient had one episode of coffee ground emesis. He is feeling better after zofran. Has an appetite and wants to eat. Denies blood in stool or black stool. Denies smoking, alcohol or marijuana use. Denies NSAIDs or blood thinners.    Past Medical History:   Diagnosis Date    Amputation stump pain 9/10/2013    Aspiration pneumonia 7/27/2015    Asterixis 11/8/2016    C. difficile colitis 8/7/2015    Cholelithiasis without obstruction 8/25/2015    Chronic diastolic heart failure     2-23-17   1 - Low normal to mildly depressed left ventricular systolic function (EF 50-55%).    2 - Right ventricular enlargement with mildly depressed systolic function.    3 - Left ventricular diastolic dysfunction.    4 - Right atrial enlargement.    5 - Severe tricuspid regurgitation.    6 - Pulmonary hypertension. The estimated PA systolic pressure is 86 mmHg.    7 - Increased central venous pressure.    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 OR 1ST FLR    COLONOSCOPY      COLONOSCOPY N/A 4/4/2017    Performed [facility] Walker Stern [facility] (2ND F[facility])    EGD (ESOPHAGOGASTRODUODENOSCOPY) N/A 3/7/2019    Performed by Man Galicia MD at [facility] ENDO (2ND FLR)    EGD (ESOPHAGOGASTRODUODENOSCOPY) N/A 6/12/2018    Performed by Man Galicia MD at [facility] ENDO (2ND FLR)    ESOPHAGOGASTRODUODENOSCOPY (EGD) N/A 5/22/2018    Performed by Ke Sparks MD at [facility] ENDO (2ND FLR)    ESOPHAGOGASTRODUODENOSCOPY (EGD) N/A 3/16/2018    Performed by Kevin De La Paz MD at [facility] ENDO (2ND FLR)    ESOPHAGOGASTRODUODENOSCOPY (EGD) N/A 3/8/2018    Performed by Man Galicia MD at [facility] ENDO (2ND FLR)    ESOPHAGOGASTRODUODENOSCOPY (EGD) N/A 4/4/2017    Performed by Walker Stern MD at [facility] ENDO (2ND FLR)    ESOPHAGOGASTRODUODENOSCOPY (EGD) N/A 10/17/2014    Performed by Man Galicia MD at [facility] ENDO  (2ND FLR)    FISTULOGRAM Right 9/18/2014    Performed by Grayson Hubbard [facility] at [facility] CATH LAB    FOOT AMPUTATION THROUGH METATARSAL      left foot    LEG AMPUTATION THROUGH KNEE  12/18/2013    left BKA    R AVF  9/12/12    UPPER GASTROINTESTINAL ENDOSCOPY         Review of patient's allergies indicates:   Allergen Reactions    Fosrenol [lanthanum] Nausea And Vomiting     Nausea and vomiting     Family History     Problem Relation (Age of Onset)    Alcohol abuse Maternal Grandmother    Diabetes Brother, Maternal Grandfather    Early death Mother    Heart disease Father    Hyperlipidemia Father    Hypertension Father, Sister    Kidney disease Father        Tobacco Use    Smoking status: Former Smoker     Packs/day: 1.00     Years: 10.00     Pack years: 10.00    Smokeless tobacco: Never Used   Substance and Sexual Activity    Alcohol use: No    Drug use: No    Sexual activity: Yes     Partners: Female     Birth control/protection: None     Review of Systems   Constitutional: Negative for chills and fever.   HENT: Negative for trouble swallowing.    Respiratory: Negative for cough and shortness of breath.    Cardiovascular: Negative for chest pain and leg swelling.   Gastrointestinal: Positive for vomiting. Negative for abdominal distention, abdominal pain, blood in stool, constipation, diarrhea and nausea.   Genitourinary: Negative for dysuria.   Musculoskeletal: Negative for arthralgias.   Skin: Negative for pallor.   Neurological: Negative for light-headedness and headaches.   Psychiatric/Behavioral: Negative for behavioral problems and confusion.     Objective:     Vital Signs (Most Recent):  Temp: 97.9 °F (36.6 °C) (08/19/19 0951)  Pulse: 81 (08/19/19 1101)  Resp: (!) 23 (08/19/19 1044)  BP: (!) 147/89 (08/19/19 1101)  SpO2: 100 % (08/19/19 1044) Vital Signs (24h Range):  Temp:  [97.9 °F (36.6 °C)] 97.9 °F (36.6 °C)  Pulse:  [] 81  Resp:  [20-23] 23  SpO2:  [100 %] 100 %  BP: (142-174)/(85-98)  147/89     Weight: 74.8 kg (165 lb) (08/19/19 0738)  Body mass index is 27.46 kg/m².      Intake/Output Summary (Last 24 hours) at 8/19/2019 1122  Last data filed at 8/19/2019 0800  Gross per 24 hour   Intake --   Output 400 ml   Net -400 ml       Lines/Drains/Airways     Drain                 Hemodialysis AV Fistula Right upper arm -- days          Peripheral Intravenous Line                 Peripheral IV - Single Lumen 08/19/19 0753 18 G Left Antecubital less than 1 day         Peripheral IV - Single Lumen 08/19/19 0824 20 G Left Hand less than 1 day                Physical Exam   Constitutional: He is oriented to person, place, and time. He appears well-developed and well-nourished. No distress.   HENT:   Mouth/Throat: Oropharynx is clear and moist.   Neck: Neck supple.   Cardiovascular: Normal rate and regular rhythm.   No murmur heard.  Pulmonary/Chest: Effort normal and breath sounds normal.   Abdominal: Soft. Bowel sounds are normal. He exhibits no distension. There is no tenderness. There is no rebound and no guarding.   Neurological: He is alert and oriented to person, place, and time.   Skin: Skin is warm and dry. No rash noted.   Psychiatric: He has a normal mood and affect. His behavior is normal.   Nursing note and vitals reviewed.      Significant Labs:  All pertinent lab results from the last 24 hours have been reviewed.    Significant Imaging:  Imaging results within the past 24 hours have been reviewed.    Assessment/Plan:     * Hematemesis  53yo M w/PMH of ESRD (on HD) presents with coffee ground hematemesis. Hemodynamically stable, Hgb 9.1 (b/l 11-12s).    - Recommend IV PPI bid  - Serial H/H  - Antiemetics prn. Consider scheduled zofran. If no relief, consider reglan.  - Keep on CLD today  - Keep head of bed up  - If massive hematemesis, low threshold for intubation for airway protection  - No need for octreotide as no history or concern for variceal bleed  - No need for EGD at this time as most  recent one from 8/2 with only grade A esophagitis, but will continue to monitor  - Needs outpatient gastric emptying study      Thank you for your consult. I will follow-up with patient. Please contact us if you have any additional questions.    Fabio Jones MD  Gastroenterology  [facility]

## 2019-08-19 NOTE — ED NOTES
.  Patient identifiers for Vaughn Retana 54 y.o. male checked and correct.  Chief Complaint   Patient presents with    Hematemesis     emesis of coffee ground since yesterday with bloody BMs as well and abd cramping. Sent from Dialysis.      Past Medical History:   Diagnosis Date    Amputation stump pain 9/10/2013    Aspiration pneumonia 7/27/2015    Asterixis 11/8/2016    C. difficile colitis 8/7/2015    Cholelithiasis without obstruction 8/25/2015    Chronic diastolic heart failure     2-23-17   1 - Low normal to mildly depressed left ventricular systolic function (EF 50-55%).    2 - Right ventricular enlargement with mildly depressed systolic function.    3 - Left ventricular diastolic dysfunction.    4 - Right atrial enlargement.    5 - Severe tricuspid regurgitation.    6 - Pulmonary hypertension. The estimated PA systolic pressure is 86 mmHg.    7 - Increased central venous pressure.    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    Metabolic acidosis, IAG, reduced excretion of inorganic acids     Myoclonic jerking 9/20/2016    Noncompliance with medication regimen 12/4/2018    Secondary hyperparathyroidism (of renal origin)     Secondary pulmonary hypertension 3/23/2017    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    TB lung, latent 08/25/2015    Negative Quantiferon Gold 3-23-17     Allergies reported:   Review of patient's allergies indicates:   Allergen  Reactions    Fosrenol [lanthanum] Nausea And Vomiting     Nausea and vomiting         LOC: Patient is awake, alert, and aware of environment with an appropriate affect. Patient is oriented x 3 and speaking appropriately.  APPEARANCE: Patient resting comfortably and in no acute distress. Patient is clean and well groomed, patient's clothing is properly fastened.  SKIN: The skin is warm and dry. Patient has normal skin turgor and moist mucus membranes. Skin is intact; no bruising or breakdown noted. Pt has fistula in right arm.   MUSKULOSKELETAL: Patient is moving all extremities well, no obvious deformities noted. Pulses intact. Pt has above the knee amputation to left lower extremity.   RESPIRATORY: Airway is open and patent. Respirations are spontaneous and non-labored with normal effort and rate, BBS=clear  CARDIAC: Patient has a normal rate and rhythm. ST on cardiac monitor,No peripheral edema noted.   ABDOMEN: No distention noted. Bowel sounds active in all 4 quadrant. Pt c/o generalized abdominal pain, nausea and vomiting since yesterday with coffee ground emesis.   NEUROLOGICAL: PERRL. Facial expression is symmetrical. Hand grasps are equal bilaterally. Normal sensation in all extremities when touched with finger.

## 2019-08-19 NOTE — ED NOTES
Pt on cardiac monitor, bp cuff and continuous pulse ox. Call light within reach, will continue to monitor.

## 2019-08-19 NOTE — H&P
[facility]  Critical Care Medicine  History & Physical    Patient Name: Vaughn Retana  MRN: [id number]  Admission Date: 8/19/2019  Hospital Length of Stay: 0 days  Code Status: Full Code  Attending Physician: Fabio Taylor DO   Primary Care Provider: Yvette Zelaya NP   Principal Problem: Hematemesis    Subjective:     HPI:  Patient is a 53 y/o male with a mhx of recurrent hematemesis, esophagitis, ESRD, DM and CHF who presented to the hospital with complaints of hematemesis for one day. He reports that last night he started throwing up blood and had multiple episodes today prior to arrival. Upon admission he did have one episode of coffee ground emesis. He denies any precipitating event or trauma. He also denies any headaches, feeling lightheaded, blurry vision, abdominal or epigastric pain and melena. He was given octreotide and had relief with zofran. He has now regained his appetite and feels much better. Workup was significant for HgB 9.1 (BL 11-13), CXR did not show any acute intrathoracic findings. Metabolic panel significant for creatinine of 11.9 but he is due for HD today (typically MWF).    Of note he was discharged from [facility] for hematemesis on 8/12/2019. EGD during that admission showed grade D esophagitis without active bleeding. He is currently hemodynamically stable and has no current complaints. Pt will be admitted to the critical care unit for significant hematemesis + airway watch.          Hospital/ICU Course:  Admitted to the critical care unit for airway watch due to hematemesis. He was given zofran and a one time dose of protonix 80mg. Currently being treated with protonix 40mg bid.      Past Medical History:   Diagnosis Date    Amputation stump pain 9/10/2013    Aspiration pneumonia 7/27/2015    Asterixis 11/8/2016    C. difficile colitis 8/7/2015    Cholelithiasis without obstruction 8/25/2015    Chronic diastolic heart failure     2-23-17   1 - Low  normal to mildly depressed left ventricular systolic function (EF 50-55%).    2 - Right ventricular enlargement with mildly depressed systolic function.    3 - Left ventricular diastolic dysfunction.    4 - Right atrial enlargement.    5 - Severe tricuspid regurgitation.    6 - Pulmonary hypertension. The estimated PA systolic pressure is 86 mmHg.    7 - Increased central venous pressure.    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    Metabolic acidosis, IAG, reduced excretion of inorganic acids     Myoclonic jerking 9/20/2016    Noncompliance with medication regimen 12/4/2018    Secondary hyperparathyroidism (of renal origin)     Secondary pulmonary hypertension 3/23/2017    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    TB lung, latent 08/25/2015    Negative Quantiferon Gold 3-23-17       Past Surgical History:   Procedure Laterality Date    AMPUTATION, BELOW KNEE Left 12/18/2013    Performed by Elgin Houston MD at [facility] OR 1ST FLR    COLONOSCOPY      COLONOSCOPY N/A 4/4/2017    Performed by Walker [facility] at [facility] (2ND FLR)    EGD (ESOPHAGOGASTRODUODENOSCOPY) N/A 3/7/2019    Performed by Man Galicia MD at [facility] ENDO (2ND FLR)    EGD (ESOPHAGOGASTRODUODENOSCOPY) N/A 6/12/2018    Performed by Man Galicia MD at [facility] ENDO (2ND FLR)    ESOPHAGOGASTRODUODENOSCOPY (EGD) N/A 5/22/2018    Performed by Ke Sparks MD at [facility] (2ND FLR)     ESOPHAGOGASTRODUODENOSCOPY (EGD) N/A 3/16/2018    Performed by Kevin De La Paz MD at [facility] ENDO (2ND FLR)    ESOPHAGOGASTRODUODENOSCOPY (EGD) N/A 3/8/2018    Performed by Man Galicia MD at [facility] ENDO (2ND FLR)    ESOPHAGOGASTRODUODENOSCOPY (EGD) N/A 4/4/2017    Performed by Walker Stern MD at [facility] ENDO (2ND FLR)    ESOPHAGOGASTRODUODENOSCOPY (EGD) N/A 10/17/2014    Performed by Man Galicia MD at [facility] ENDO (2ND FLR)    FISTULOGRAM Right 9/18/2014    Performed by Grayson Hubbard MD at [facility] CATH LAB    FOOT AMPUTATION THROUGH METATARSAL      left foot    LEG AMPUTATION THROUGH KNEE  12/18/2013    left BKA    R AVF  9/12/12    UPPER GASTROINTESTINAL ENDOSCOPY         Review of patient's allergies indicates:   Allergen Reactions    Fosrenol [lanthanum] Nausea And Vomiting     Nausea and vomiting       Family History     Problem Relation (Age of Onset)    Alcohol abuse Maternal Grandmother    Diabetes Brother, Maternal Grandfather    Early death Mother    Heart disease Father    Hyperlipidemia Father    Hypertension Father, Sister    Kidney disease Father        Tobacco Use    Smoking status: Former Smoker     Packs/day: 1.00     Years: 10.00     Pack years: 10.00    Smokeless tobacco: Never Used   Substance and Sexual Activity    Alcohol use: No    Drug use: No    Sexual activity: Yes     Partners: Female     Birth control/protection: None      Review of Systems   Constitutional: Negative for activity change, appetite change, diaphoresis, fatigue and fever.   HENT: Negative for sore throat.    Eyes: Negative for visual disturbance.   Respiratory: Negative for cough, chest tightness, shortness of breath and wheezing.    Cardiovascular: Negative for chest pain, palpitations and leg swelling.   Gastrointestinal: Positive for vomiting. Negative for abdominal distention, abdominal pain, anal bleeding, blood in stool and diarrhea.   Endocrine: Negative for cold intolerance and heat intolerance.    Genitourinary: Negative for hematuria.   Musculoskeletal: Negative for arthralgias and joint swelling.   Skin: Negative for pallor and rash.   Neurological: Positive for dizziness. Negative for weakness, light-headedness, numbness and headaches.   Psychiatric/Behavioral: Negative for hallucinations. The patient is not nervous/anxious.      Objective:     Vital Signs (Most Recent):  Temp: 97.9 °F (36.6 °C) (08/19/19 1246)  Pulse: 81 (08/19/19 1101)  Resp: 16 (08/19/19 1246)  BP: (!) 147/89 (08/19/19 1101)  SpO2: 100 % (08/19/19 1246) Vital Signs (24h Range):  Temp:  [97.9 °F (36.6 °C)-98 °F (36.7 °C)] 97.9 °F (36.6 °C)  Pulse:  [] 81  Resp:  [16-23] 16  SpO2:  [100 %] 100 %  BP: (142-174)/(85-98) 147/89   Weight: 74.8 kg (165 lb)  Body mass index is 27.46 kg/m².      Intake/Output Summary (Last 24 hours) at 8/19/2019 1530  Last data filed at 8/19/2019 0800  Gross per 24 hour   Intake --   Output 400 ml   Net -400 ml       Physical Exam   Constitutional: He is oriented to person, place, and time. He appears well-developed and well-nourished. No distress.   HENT:   Head: Normocephalic and atraumatic.   Mouth/Throat: Mucous membranes are pale and dry.   Neck: Neck supple.   Cardiovascular: Normal rate and regular rhythm.   No murmur heard.  Pulmonary/Chest: Effort normal and breath sounds normal. No respiratory distress. He has no wheezes.   Abdominal: Soft. Bowel sounds are normal. He exhibits no distension. There is no tenderness. There is no rebound and no guarding.   Musculoskeletal: He exhibits no edema or tenderness.   Left lower extremity prosthetic    Neurological: He is alert and oriented to person, place, and time.   Skin: Skin is warm and dry. No rash noted.   Psychiatric: He has a normal mood and affect. His behavior is normal.   Nursing note and vitals reviewed.      Vents:     Lines/Drains/Airways     Drain                 Hemodialysis AV Fistula Right upper arm -- days          Peripheral  Intravenous Line                 Peripheral IV - Single Lumen 08/19/19 0753 18 G Left Antecubital less than 1 day         Peripheral IV - Single Lumen 08/19/19 0824 20 G Left Hand less than 1 day              Significant Labs:    CBC/Anemia Profile:  Recent Labs   Lab 08/19/19  0756 08/19/19  0847 08/19/19  1156   WBC 7.85  --   --    HGB 9.1*  --  8.6*   HCT 28.1* 27*  --      --   --    *  --   --    RDW 14.4  --   --         Chemistries:  Recent Labs   Lab 08/19/19  0756      K 3.5   CL 95   CO2 31*   BUN 23*   CREATININE 11.0*   CALCIUM 9.7   ALBUMIN 3.7   PROT 9.0*   BILITOT 0.5   ALKPHOS 267*   ALT 14   AST 25   MG 2.1   PHOS 2.6*       Significant Imaging:   X-Ray Chest AP Portable  Narrative: EXAMINATION:  XR CHEST AP PORTABLE    CLINICAL HISTORY:  gi bleed;    TECHNIQUE:  Single frontal view of the chest was performed.    COMPARISON:  Radiograph 06/21/2019.    FINDINGS:  Cardiac monitoring leads project over the bilateral hemithoraces.  Mediastinal structures are midline.  Hilar contours are unremarkable.  Cardiac silhouette is magnified by AP technique.  Lung volumes are symmetric.  No consolidation.  No pneumothorax or pleural effusions.  No free air beneath the diaphragm.  Hypertrophic changes of the left AC joint noted.  No acute osseous abnormalities.  Vascular stent projects over the right subclavian region.  Impression: 1. No acute radiographic findings in the chest on this single view.    Electronically signed by: Jatin Duncan MD  Date:    08/19/2019  Time:    08:37      Assessment/Plan:     Renal/  ESRD on hemodialysis  Currently on HD MWF    - Pt was sent from dialysis to the ED. sCr 11  - Nephrology consulted for inpatient management of dialysis    Endocrine  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, without long-term current use of insulin  Currently controlled with diet with his last HgbA1c being 4.1.  Currently on a clear liquid diet.     - Advance  diet as tolerated to a diabetic renal diet.   - Glucose on admit 107    GI  * Hematemesis  55 y/o male with a hx of hematemesis and gastritis who presented with multiple episodes of coffee ground hematemesis. Hemodynamically stable. Received Protonix 80mg IV.     - Protonix 40mg bid  - Trend H/H  - Transfuse HgB <7   - Zofran prn   - Diet: Clear Liquid  - If massive hematemesis, low threshold for intubation for airway protection  - f/u GI recommendations      Critical Care Daily Checklist:    A: Awake: RASS Goal/Actual Goal:    Actual:     B: Spontaneous Breathing Trial Performed?  n/a   C: SAT & SBT Coordinated?  n/a                      D: Delirium: CAM-ICU     E: Early Mobility Performed? No   F: Feeding Goal:    Status:     Current Diet Order   Procedures    Diet NPO    Diet clear liquid      AS: Analgesia/Sedation n/a   T: Thromboembolic Prophylaxis Hematemesis    H: HOB > 300 Yes   U: Stress Ulcer Prophylaxis (if needed) Protonix   G: Glucose Control Diet controlled   B: Bowel Function     I: Indwelling Catheter (Lines & Arcos) Necessity Peripheral IVs   D: De-escalation of Antimicrobials/Pharmacotherapies As clinically indicated    Plan for the day/ETD Admit to critical care    Code Status:  Family/Goals of Care: Full Code         Critical secondary to Patient has a condition that poses threat to life and bodily function: Airway watch     Critical care was time spent personally by me on the following activities: development of treatment plan with patient or surrogate and bedside caregivers, discussions with consultants, evaluation of patient's response to treatment, examination of patient, ordering and performing treatments and interventions, ordering and review of laboratory studies, ordering and review of radiographic studies, pulse oximetry, re-evaluation of patient's condition. This critical care time did not overlap with that of any other provider or involve time for any procedures.    Will discuss  plan with the attending.    Rick Cabral MD  Critical Care Medicine  [facility]

## 2019-08-19 NOTE — ASSESSMENT & PLAN NOTE
53 y/o male with a hx of hematemesis and gastritis who presented with multiple episodes of coffee ground hematemesis. Hemodynamically stable. Received Protonix 80mg IV.     - Protonix 40mg bid  - Trend H/H  - Transfuse HgB <7   - Zofran prn   - Diet: Clear Liquid  - If massive hematemesis, low threshold for intubation for airway protection  - f/u GI recommendations

## 2019-08-19 NOTE — SUBJECTIVE & OBJECTIVE
Past Medical History:   Diagnosis Date    Amputation stump pain 9/10/2013    Aspiration pneumonia 7/27/2015    Asterixis 11/8/2016    C. difficile colitis 8/7/2015    Cholelithiasis without obstruction 8/25/2015    Chronic diastolic heart failure     2-23-17   1 - Low normal to mildly depressed left ventricular systolic function (EF 50-55%).    2 - Right ventricular enlargement with mildly depressed systolic function.    3 - Left ventricular diastolic dysfunction.    4 - Right atrial enlargement.    5 - Severe tricuspid regurgitation.    6 - Pulmonary hypertension. The estimated PA systolic pressure is 86 mmHg.    7 - Increased central venous pressure.    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    Metabolic acidosis, IAG, reduced excretion of inorganic acids     Myoclonic jerking 9/20/2016    Noncompliance with medication regimen 12/4/2018    Secondary hyperparathyroidism (of renal origin)     Secondary pulmonary hypertension 3/23/2017    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    TB lung, latent 08/25/2015    Negative Quantiferon Gold 3-23-17       Past Surgical History:   Procedure Laterality Date    AMPUTATION, BELOW KNEE Left 12/18/2013    Performed by Elgin Houston MD at [facility] OR 1ST FLR    COLONOSCOPY      COLONOSCOPY N/A 4/4/2017    Performed by Walker Stern MD at [facility] ENDO (2ND FLR)    EGD (ESOPHAGOGASTRODUODENOSCOPY) N/A 3/7/2019    Performed by  Man Galicia MD at [facility] ENDO (2ND FLR)    EGD (ESOPHAGOGASTRODUODENOSCOPY) N/A 6/12/2018    Performed by Man Galicia MD at [facility] ENDO (2ND FLR)    ESOPHAGOGASTRODUODENOSCOPY (EGD) N/A 5/22/2018    Performed by Ke Sparks MD at [facility] ENDO (2ND FLR)    ESOPHAGOGASTRODUODENOSCOPY (EGD) N/A 3/16/2018    Performed by Kevin De La Paz MD at [facility] ENDO (2ND FLR)    ESOPHAGOGASTRODUODENOSCOPY (EGD) N/A 3/8/2018    Performed by Man Galicia MD at [facility] ENDO (2ND FLR)    ESOPHAGOGASTRODUODENOSCOPY (EGD) N/A 4/4/2017    Performed by Walker Stern MD at [facility] ENDO (2ND FLR)    ESOPHAGOGASTRODUODENOSCOPY (EGD) N/A 10/17/2014    Performed by Man Galicia MD at [facility] (2ND FLR)    FISTULOGRAM Right 9/18/2014    Performed by Grayson Hubbard MD at [facility] CATH LAB    FOOT AMPUTATION THROUGH METATARSAL      left foot    LEG AMPUTATION THROUGH KNEE  12/18/2013    left BKA    R AVF  9/12/12    UPPER GASTROINTESTINAL ENDOSCOPY         Review of patient's allergies indicates:   Allergen Reactions    Fosrenol [lanthanum] Nausea And Vomiting     Nausea and vomiting     Family History     Problem Relation (Age of Onset)    Alcohol abuse Maternal Grandmother    Diabetes Brother, Maternal Grandfather    Early death Mother    Heart disease Father    Hyperlipidemia Father    Hypertension Father, Sister    Kidney disease Father        Tobacco Use    Smoking status: Former Smoker     Packs/day: 1.00     Years: 10.00     Pack years: 10.00    Smokeless tobacco: Never Used   Substance and Sexual Activity    Alcohol use: No    Drug use: No    Sexual activity: Yes     Partners: Female     Birth control/protection: None     Review of Systems   Constitutional: Negative for chills and fever.   HENT: Negative for trouble swallowing.    Respiratory: Negative for cough and shortness of breath.    Cardiovascular: Negative for chest pain and leg swelling.   Gastrointestinal: Positive for vomiting. Negative for abdominal  distention, abdominal pain, blood in stool, constipation, diarrhea and nausea.   Genitourinary: Negative for dysuria.   Musculoskeletal: Negative for arthralgias.   Skin: Negative for pallor.   Neurological: Negative for light-headedness and headaches.   Psychiatric/Behavioral: Negative for behavioral problems and confusion.     Objective:     Vital Signs (Most Recent):  Temp: 97.9 °F (36.6 °C) (08/19/19 0951)  Pulse: 81 (08/19/19 1101)  Resp: (!) 23 (08/19/19 1044)  BP: (!) 147/89 (08/19/19 1101)  SpO2: 100 % (08/19/19 1044) Vital Signs (24h Range):  Temp:  [97.9 °F (36.6 °C)] 97.9 °F (36.6 °C)  Pulse:  [] 81  Resp:  [20-23] 23  SpO2:  [100 %] 100 %  BP: (142-174)/(85-98) 147/89     Weight: 74.8 kg (165 lb) (08/19/19 0738)  Body mass index is 27.46 kg/m².      Intake/Output Summary (Last 24 hours) at 8/19/2019 1122  Last data filed at 8/19/2019 0800  Gross per 24 hour   Intake --   Output 400 ml   Net -400 ml       Lines/Drains/Airways     Drain                 Hemodialysis AV Fistula Right upper arm -- days          Peripheral Intravenous Line                 Peripheral IV - Single Lumen 08/19/19 0753 18 G Left Antecubital less than 1 day         Peripheral IV - Single Lumen 08/19/19 0824 20 G Left Hand less than 1 day                Physical Exam   Constitutional: He is oriented to person, place, and time. He appears well-developed and well-nourished. No distress.   HENT:   Mouth/Throat: Oropharynx is clear and moist.   Neck: Neck supple.   Cardiovascular: Normal rate and regular rhythm.   No murmur heard.  Pulmonary/Chest: Effort normal and breath sounds normal.   Abdominal: Soft. Bowel sounds are normal. He exhibits no distension. There is no tenderness. There is no rebound and no guarding.   Neurological: He is alert and oriented to person, place, and time.   Skin: Skin is warm and dry. No rash noted.   Psychiatric: He has a normal mood and affect. His behavior is normal.   Nursing note and vitals  reviewed.      Significant Labs:  All pertinent lab results from the last 24 hours have been reviewed.    Significant Imaging:  Imaging results within the past 24 hours have been reviewed.

## 2019-08-19 NOTE — CONSULTS
Patient has been seen and evaluated by me. Active hematemesis- ESRD, dizziness. Awaiting Hb but with the volume of hematemesis will need ICU monitoring at least initially.  Full H and P to follow.    Jojo Shelby M.D.  [facility] Pulmonary/Critical Care Fellow

## 2019-08-19 NOTE — ASSESSMENT & PLAN NOTE
Currently on HD MWF    - Pt was sent from dialysis to the ED. sCr 11  - Nephrology consulted for inpatient management of dialysis

## 2019-08-19 NOTE — ASSESSMENT & PLAN NOTE
53yo M w/PMH of ESRD (on HD) presents with coffee ground hematemesis. Hemodynamically stable, Hgb 9.1 (b/l 11-12s).    - Recommend IV PPI bid  - Serial H/H  - Antiemetics prn  - Keep on CLD today  - If massive hematemesis, low threshold for intubation for airway protection  - No need for octreotide as no history or concern for variceal bleed  - No need for EGD at this time as most recent one from 8/2 with only grade A esophagitis, but will continue to monitor  - Needs outpatient gastric emptying study

## 2019-08-19 NOTE — ED NOTES
Pt resting in bed, denies complaints, no pain, no vomiting, pt tolerating po fluids without nausea.

## 2019-08-19 NOTE — ED NOTES
Pt resting in bed at this time, pt on cardiac monitor, bp cuff and continuous pulse ox. Call light within reach. Will continue to monitor.

## 2019-08-19 NOTE — HPI
Vaughn Retana is a 54 y.o. male who presents with complaints of acute onset coffee ground emesis with no known trigger and no aggravating/alleviating factors. Multiple episodes prior to presentation with the ED. EGD in 3/2019 showed grade D esophagitis. Also has a history of non-bleeding gastric ulcer (not seen on most recent EGDs this year). He was admitted to [facility] twice early August for same, EGD 8/2 showed grade A esophagitis with no active bleeding.  Since presentation to ED, patient had one episode of coffee ground emesis. He is feeling better after zofran. Has an appetite and wants to eat. Denies blood in stool or black stool. Denies smoking, alcohol or marijuana use. Denies NSAIDs or blood thinners.

## 2019-08-19 NOTE — HPI
Patient is a 55 y/o male with a mhx of recurrent hematemesis, esophagitis, ESRD, DM and CHF who presented to the hospital with complaints of hematemesis for one day. He reports that last night he started throwing up blood and had multiple episodes today prior to arrival, (600cc per EMS). Upon admission he did have one episode of coffee ground emesis. He denies any precipitating event or trauma. He also denies any headaches, feeling lightheaded, blurry vision, abdominal or epigastric pain and melena. He was given octreotide and had relief with zofran. He has now regained his appetite and feels much better. Workup was significant for HgB 9.1 (BL 11-13), CXR did not show any acute intrathoracic findings. Metabolic panel significant for creatinine of 11.9 but he is due for HD today (typically MWF).    Of note he was discharged from [facility] for hematemesis on 8/12/2019. EGD during that admission showed grade D esophagitis without active bleeding. He is currently hemodynamically stable and has no current complaints. Pt will be admitted to the critical care unit for significant hematemesis + airway watch.

## 2019-08-20 PROBLEM — E83.39 HYPOPHOSPHATEMIA: Status: ACTIVE | Noted: 2019-01-01

## 2019-08-20 PROBLEM — D64.9 ANEMIA: Status: ACTIVE | Noted: 2019-01-01

## 2019-08-20 NOTE — PLAN OF CARE
Problem: Adult Inpatient Plan of Care  Goal: Plan of Care Review  3 Hour hd tx completed removing 1 L w/o diff. Bld returned dialyzer cleared well  needles removed bleeding x5min each pressure dsg applied after. Report given vss nad

## 2019-08-20 NOTE — RESIDENT HANDOFF
Handoff     Primary Team: [facility] CRITICAL CARE MEDICINE Room Number: [id number] A     Patient Name: Vaughn Retana MRN: [id number]     Date of Birth: [id number] Allergies: Fosrenol [lanthanum]     Age: 54 y.o. Admit Date: 8/19/2019     Sex: male  BMI: Body mass index is 22.01 kg/m².     Code Status: Full Code        Illness Level (current clinical status): Watcher - No    Reason for Admission: Hematemesis    Brief HPI (pertinent PMH and diagnosis or differential diagnosis): Patient is a 55 y/o male with a mhx of recurrent hematemesis, esophagitis, ESRD, DM and CHF who presented to the hospital with complaints of hematemesis for one day    Hospital Course (updated, brief assessment by system or problem, significant events):   Admitted to the critical care unit for airway watch due to hematemesis. He was given zofran and a one time dose of protonix 80mg. S/p transfusion of 1pRBC with adequate response. Currently being treated with protonix 40mg bid. Hemodynamically stable since admission.   GI recommendations: outpatient evaluation.     Tasks (specific, using if-then statements):  - follow HgB   - advance diet as tolerated     Contingency Plan:  - Pt's nausea responds well to zofran.    - If massive hematemesis then low threshold for intubation.       Estimated Discharge Date: TBD    Discharge Disposition: Home or Self Care    Mentored By: Dr. Morgan

## 2019-08-20 NOTE — PROGRESS NOTES
Maintenance off regular day hd tx initiated viaRA  HD fistula w/2 15g needles w/  ordered achieved w/ good blood flows noted. Lines taped securely and visible at all times.

## 2019-08-20 NOTE — PLAN OF CARE
PCP: Yvette Zelaya NP    Pharmacy:   [facility][id number] - [place], [place] - [address]  [address]  [place] [address]  Phone: [phone] Fax: [phone]    [facility]  [address]  [facility] [address]  Phone: [phone] Fax: [phone]    [id number] - [place], [place] - [address]  [address]  [facility] [address]  Phone: [phone] Fax: [phone]    Payor: MEDICARE / Plan: MEDICARE PART A & B / Product Type: [facility] /      08/20/19 1258   Discharge Assessment   Assessment Type Discharge Planning Assessment   Confirmed/corrected address and phone number on facesheet? Yes   Assessment information obtained from? Patient   Prior to hospitilization cognitive status: Alert/Oriented   Prior to hospitalization functional status: Independent;Assistive Equipment   Current cognitive status: Alert/Oriented   Current Functional Status: Independent;Assistive Equipment   Facility Arrived From: [facility] [phone]   Lives With facility resident   Able to Return to Prior Arrangements yes   Is patient able to care for self after discharge? Unable to determine at this time (comments)   Patient's perception of discharge disposition nursing home   Readmission Within the Last 30 Days no previous admission in last 30 days   Patient currently being followed by outpatient case management? No   Patient currently receives any other outside agency services? Yes   Name and contact number of agency or person providing outside services [facility]   Is it the patient/care giver preference to resume care with the current outside agency? Yes   Equipment Currently Used at Home wheelchair   Do you have any problems affording any of your prescribed medications? TBD   Is the patient taking medications as prescribed? yes   Does the patient have transportation home? Yes   Transportation  Anticipated agency   Dialysis Name and Scheduled days [facility], M/W/F 5am   Does the patient receive services at the [facility]? No   Discharge Plan A Return to nursing home   Discharge Plan B New Nursing Home placement - nursing home care facility   DME Needed Upon Discharge  none   Patient/Family in Agreement with Plan yes

## 2019-08-20 NOTE — ED NOTES
Pt reports decreased nausea since receiving IV zofran. Pt informed of NPO status. NAD, no needs at this time.

## 2019-08-20 NOTE — ASSESSMENT & PLAN NOTE
The patient is 55 yo AA male admitted with a 1 day history of hematemesis. The patient has had multiple hospitalizations at OSHs with the same complaints. Most recent EGD completed on 8/2 revealing grade D esophagitis without active bleeding. GI consulted but recommending support care. Hgb 6.8 s/p 1 u PRBCs, Hgb now 9.1.   Patient with missed HD treatment on Monday, last dialyze on Friday, 8/16.    [facility]   3.5 hrs   Will collect outpatient care plan    Plan:   - Will provide dialysis today for metabolic clearance and volume management  - Patient evaluated while undergoing hemodialysis indicated for ESRD. Tolerating session with current UFR well, no complications.  Denies headaches, SOB, chest pain, abdominal pain, or muscle cramps.  - Target UF 1-2 L as tolerated  - Will collect outpatient care plan, patient is not familiar with his dialysis prescription and does not know his dry weight   - Recommend renal diet when appropriate  - Phos 2.6, hold binders   - Hgb 9.1 s/p blood transfusion  - Will check outpatient care plan for JONAS therapy  - Will discuss with staff

## 2019-08-20 NOTE — NURSING TRANSFER
Nursing Transfer Note      8/20/2019     Transfer From: [facility] 6095    Transfer via wheelchair    Transfer with cardiac monitoring    Transported by RN    Medicines sent: none    Chart send with patient: Yes      Upon arrival to floor: cardiac monitor applied, patient oriented to room, call bell in reach and bed in lowest position. Radha notified pt in room.

## 2019-08-20 NOTE — ASSESSMENT & PLAN NOTE
Currently on HD MWF    - Pt was sent from dialysis to the ED.   - Nephrology consulted for inpatient management of dialysis

## 2019-08-20 NOTE — CONSULTS
[facility]  Nephrology  Consult Note    Patient Name: Vaughn Retana  MRN: [id number]  Admission Date: 8/19/2019  Hospital Length of Stay: 1 days  Attending Provider: Ricky Morgan MD   Primary Care Physician: Yvette Zelaya NP  Principal Problem:Hematemesis    Consults  Subjective:     HPI: The patient is a 54 year-old AA male with a medical history of recurrent hematemesis, Grade D Esophagitis, ESRD, DM and CHF who presented to the hospital with complaints of hematemesis x1 day on 8/19. The patient endorsed multiple episodes of blood tinged emesis on Monday causing him to miss his outpatient HD appointment. According to the records, the patient has been hospitalized 4 times at OSH for complaints of hematemesis (ie 6/21, 8/1, 8/3, and 8/12). During one of his previous admissions, he had a EGD performed on 8/2 which revealed grade D esophagitis without active bleeding. In the ED, workup was significant for HgB 6.8 (BL 11-13), CXR did not show any acute intrathoracic findings. Metabolic panel significant for creatinine of 11.9. He currently denies c/o headaches, feeling lightheaded, blurry vision, abdominal or epigastric pain and melena. He was admitted to critical care for significant hematemesis and for airway protection. The patient is a MWF dialysis patient at [facility]. He typically dialyzes for 3.5 hrs. He states that he was last dialyze on 8/16. Nephrology consulted for management of ESRD and HD treatment.     Past Medical History:   Diagnosis Date    Amputation stump pain 9/10/2013    Aspiration pneumonia 7/27/2015    Asterixis 11/8/2016    C. difficile colitis 8/7/2015    Cholelithiasis without obstruction 8/25/2015    Chronic diastolic heart failure     2-23-17   1 - Low normal to mildly depressed left ventricular systolic function (EF 50-55%).    2 - Right ventricular enlargement with mildly depressed systolic function.    3 - Left ventricular diastolic dysfunction.     4 - Right atrial enlargement.    5 - Severe tricuspid regurgitation.    6 - Pulmonary hypertension. The estimated PA systolic pressure is 86 mmHg.    7 - Increased central venous pressure.    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 OR 1ST FLR    COLONOSCOPY      COLONOSCOPY N/A 4/4/2017    Performed by Walker Stern [facility] at [facility] (2ND FLR)    EGD (ESOPHAGOGASTRODUODENOSCOPY) N/A 3/7/2019    Performed by Man Galicia MD at [facility] (2ND FLR)    EGD (ESOPHAGOGASTRODUODENOSCOPY) N/A 6/12/2018    Performed by Man Galicia [facility] at [facility] ENDO (2ND FLR)    ESOPHAGOGASTRODUODENOSCOPY (EGD) N/A 5/22/2018    Performed by Ke Sparks MD at [facility] ENDO (2ND FLR)    ESOPHAGOGASTRODUODENOSCOPY (EGD) N/A 3/16/2018    Performed by Kevin De La Paz MD at [facility] ENDO (2ND FLR)    ESOPHAGOGASTRODUODENOSCOPY (EGD) N/A 3/8/2018    Performed by Man Galicia MD at [facility] (2ND  FLR)    ESOPHAGOGASTRODUODENOSCOPY (EGD) N/A 4/4/2017    Performed by Walker Stern MD at [facility] ENDO (2ND FLR)    ESOPHAGOGASTRODUODENOSCOPY (EGD) N/A 10/17/2014    Performed by Man Galicia MD at [facility] ENDO (2ND FLR)    FISTULOGRAM Right 9/18/2014    Performed by Grayson Hubbard MD at [facility] CATH LAB    FOOT AMPUTATION THROUGH METATARSAL      left foot    LEG AMPUTATION THROUGH KNEE  12/18/2013    left BKA    R AVF  9/12/12    UPPER GASTROINTESTINAL ENDOSCOPY         Review of patient's allergies indicates:   Allergen Reactions    Fosrenol [lanthanum] Nausea And Vomiting     Nausea and vomiting     Current Facility-Administered Medications   Medication Frequency    0.9%  NaCl infusion (for blood administration) Q24H PRN    0.9%  NaCl infusion Once    ondansetron injection 8 mg Q4H PRN    pantoprazole EC tablet 40 mg BID AC    sodium chloride 0.9% flush 10 mL PRN     Family History     Problem Relation (Age of Onset)    Alcohol abuse Maternal Grandmother    Diabetes Brother, Maternal Grandfather    Early death Mother    Heart disease Father    Hyperlipidemia Father    Hypertension Father, Sister    Kidney disease Father        Tobacco Use    Smoking status: Former Smoker     Packs/day: 1.00     Years: 10.00     Pack years: 10.00    Smokeless tobacco: Never Used   Substance and Sexual Activity    Alcohol use: No    Drug use: No    Sexual activity: Yes     Partners: Female     Birth control/protection: None     Review of Systems   Constitutional: Negative for chills, diaphoresis, fatigue and fever.   HENT: Negative for sore throat.    Respiratory: Negative for chest tightness, shortness of breath and wheezing.    Cardiovascular: Negative for chest pain and palpitations.   Gastrointestinal: Positive for nausea and vomiting. Negative for abdominal distention, abdominal pain, anal bleeding and blood in stool.   Endocrine: Negative for cold intolerance and heat intolerance.   Genitourinary: Positive for  decreased urine volume. Negative for hematuria.   Musculoskeletal: Positive for gait problem. Negative for arthralgias and joint swelling.   Skin: Negative for pallor and rash.   Neurological: Negative for dizziness, weakness, light-headedness, numbness and headaches.   Psychiatric/Behavioral: Negative for hallucinations. The patient is not nervous/anxious.      Objective:     Vital Signs (Most Recent):  Temp: 97.9 °F (36.6 °C) (08/20/19 1230)  Pulse: 73 (08/20/19 1300)  Resp: 20 (08/20/19 1300)  BP: 120/65 (08/20/19 1300)  SpO2: 100 % (08/20/19 1300)  O2 Device (Oxygen Therapy): room air (08/20/19 1300) Vital Signs (24h Range):  Temp:  [97 °F (36.1 °C)-98.8 °F (37.1 °C)] 97.9 °F (36.6 °C)  Pulse:  [] 73  Resp:  [9-37] 20  SpO2:  [88 %-100 %] 100 %  BP: ()/() 120/65     Weight: 60 kg (132 lb 4.4 oz) (08/20/19 0700)  Body mass index is 22.01 kg/m².  Body surface area is 1.66 meters squared.    I/O last 3 completed shifts:  In: 524.3 [Blood:524.3]  Out: 900 [Emesis/NG output:900]    Physical Exam   Constitutional: He is oriented to person, place, and time. He appears well-developed and well-nourished. No distress.   HENT:   Head: Normocephalic and atraumatic.   Right Ear: External ear normal.   Left Ear: External ear normal.   Mouth/Throat: Mucous membranes are pale and dry.   Eyes: Right eye exhibits no discharge. Left eye exhibits no discharge. No scleral icterus.   Neck: Neck supple.   Cardiovascular: Normal rate and regular rhythm.   No murmur heard.  Pulmonary/Chest: Effort normal and breath sounds normal. No respiratory distress. He has no wheezes.   Abdominal: Soft. Bowel sounds are normal. He exhibits no distension. There is no tenderness. There is no rebound and no guarding.   Musculoskeletal: He exhibits deformity. He exhibits no edema or tenderness.   L BKA   Neurological: He is alert and oriented to person, place, and time.   Skin: Skin is warm and dry. No rash noted.   Psychiatric: He  has a normal mood and affect. His behavior is normal.   Nursing note and vitals reviewed.      Significant Labs:  CBC:   Recent Labs   Lab 08/20/19  0434   WBC 8.36   RBC 2.89*   HGB 9.1*   HCT 28.3*      MCV 98   MCH 31.5*   MCHC 32.2     CMP:   Recent Labs   Lab 08/20/19  0434   GLU 74   CALCIUM 8.6*   ALBUMIN 3.1*   PROT 7.4      K 3.2*   CO2 27      BUN 27*   CREATININE 12.2*   ALKPHOS 230*   ALT 11   AST 20   BILITOT 0.5         Assessment/Plan:     * Hematemesis  - Being follow by admitting service and GI who is recommending conservative managmenet     ESRD on hemodialysis  The patient is 53 yo AA male admitted with a 1 day history of hematemesis. The patient has had multiple hospitalizations at OSHs with the same complaints. Most recent EGD completed on 8/2 revealing grade D esophagitis without active bleeding. GI consulted but recommending support care. Hgb 6.8 s/p 1 u PRBCs, Hgb now 9.1.   Patient with missed HD treatment on Monday, last dialyze on Friday, 8/16.    [facility]   3.5 hrs   Will collect outpatient care plan    Plan:   - Will provide dialysis today for metabolic clearance and volume management  - Patient evaluated while undergoing hemodialysis indicated for ESRD. Tolerating session with current UFR well, no complications.  Denies headaches, SOB, chest pain, abdominal pain, or muscle cramps.  - Target UF 1-2 L as tolerated  - Will collect outpatient care plan, patient is not familiar with his dialysis prescription and does not know his dry weight   - Recommend renal diet when appropriate  - Phos 2.6, hold binders   - Hgb 9.1 s/p blood transfusion  - Will check outpatient care plan for JONAS therapy  - Will discuss with staff           Thank you for your consult. I will follow-up with patient. Please contact us if you have any additional questions.    Glenis Benavidez DNP, FNP-C  Nephrology  [facility]

## 2019-08-20 NOTE — PROGRESS NOTES
[facility]  Critical Care Medicine  Progress Note    Patient Name: Vaughn Retana  MRN: [id number]  Admission Date: 8/19/2019  Hospital Length of Stay: 1 days  Code Status: Full Code  Attending Provider: Ricky Morgan MD  Primary Care Provider: Yvette Zelaya NP   Principal Problem: Hematemesis    Subjective:     HPI:  Patient is a 55 y/o male with a mhx of recurrent hematemesis, esophagitis, ESRD, DM and CHF who presented to the hospital with complaints of hematemesis for one day. He reports that last night he started throwing up blood and had multiple episodes today prior to arrival, (600cc per EMS). Upon admission he did have one episode of coffee ground emesis. He denies any precipitating event or trauma. He also denies any headaches, feeling lightheaded, blurry vision, abdominal or epigastric pain and melena. He was given octreotide and had relief with zofran. He has now regained his appetite and feels much better. Workup was significant for HgB 9.1 (BL 11-13), CXR did not show any acute intrathoracic findings. Metabolic panel significant for creatinine of 11.9 but he is due for HD today (typically MWF).    Of note he was discharged from [facility] for hematemesis on 8/12/2019. EGD during that admission showed grade D esophagitis without active bleeding. He is currently hemodynamically stable and has no current complaints. Pt will be admitted to the critical care unit for significant hematemesis + airway watch.          Hospital/ICU Course:  Admitted to the critical care unit for airway watch due to hematemesis. He was given zofran and a one time dose of protonix 80mg. Currently being treated with protonix 40mg bid.     Interval History/Significant Events:   Following clear liquids, patient had another episode of coffee ground hematemesis. No feelings of headache, dizziness, abdominal pain or chest pain.   HgB decreased to 6.8, 1 unit pRBCs given.     Review of Systems   Constitutional:  Negative for chills, diaphoresis, fatigue and fever.   HENT: Negative for sore throat.    Respiratory: Negative for chest tightness, shortness of breath and wheezing.    Cardiovascular: Negative for chest pain and palpitations.   Gastrointestinal: Positive for nausea and vomiting. Negative for abdominal distention, abdominal pain, anal bleeding and blood in stool.   Endocrine: Negative for cold intolerance and heat intolerance.   Genitourinary: Negative for hematuria.   Musculoskeletal: Negative for arthralgias and joint swelling.   Skin: Negative for pallor and rash.   Neurological: Negative for dizziness, weakness, light-headedness, numbness and headaches.   Psychiatric/Behavioral: Negative for hallucinations. The patient is not nervous/anxious.      Objective:     Vital Signs (Most Recent):  Temp: 97 °F (36.1 °C) (08/20/19 0300)  Pulse: 73 (08/20/19 0700)  Resp: 12 (08/20/19 0700)  BP: (!) 140/83 (08/20/19 0700)  SpO2: 97 % (08/20/19 0700) Vital Signs (24h Range):  Temp:  [97 °F (36.1 °C)-98.8 °F (37.1 °C)] 97 °F (36.1 °C)  Pulse:  [] 73  Resp:  [10-37] 12  SpO2:  [95 %-100 %] 97 %  BP: (126-182)/() 140/83   Weight: 60 kg (132 lb 4.4 oz)  Body mass index is 22.01 kg/m².      Intake/Output Summary (Last 24 hours) at 8/20/2019 0809  Last data filed at 8/20/2019 0000  Gross per 24 hour   Intake 524.25 ml   Output 500 ml   Net 24.25 ml       Physical Exam   Constitutional: He is oriented to person, place, and time. He appears well-developed and well-nourished. No distress.   HENT:   Head: Normocephalic and atraumatic.   Mouth/Throat: Mucous membranes are pale and dry.   Neck: Neck supple.   Cardiovascular: Normal rate and regular rhythm.   No murmur heard.  Pulmonary/Chest: Effort normal and breath sounds normal. No respiratory distress. He has no wheezes.   Abdominal: Soft. Bowel sounds are normal. He exhibits no distension. There is no tenderness. There is no rebound and no guarding.   Musculoskeletal:  He exhibits no edema or tenderness.   Left lower extremity amputation   Neurological: He is alert and oriented to person, place, and time.   Skin: Skin is warm and dry. No rash noted.   Psychiatric: He has a normal mood and affect. His behavior is normal.   Nursing note and vitals reviewed.      Vents:     Lines/Drains/Airways     Drain                 Hemodialysis AV Fistula Right upper arm -- days         Hemodialysis AV Fistula Right upper arm -- days          Peripheral Intravenous Line                 Peripheral IV - Single Lumen 08/19/19 0753 18 G Left Antecubital 1 day         Peripheral IV - Single Lumen 08/19/19 0824 20 G Left Hand less than 1 day              Significant Labs:    CBC/Anemia Profile:  Recent Labs   Lab 08/19/19  1840 08/19/19  2337 08/20/19  0434   WBC 5.65 13.98* 8.36   HGB 6.8* 9.5* 9.1*   HCT 20.4* 29.5* 28.3*    192 208   MCV 99* 100* 98   RDW 14.5 14.5 14.6*        Chemistries:  Recent Labs   Lab 08/19/19  0756 08/20/19  0434    144   K 3.5 3.2*   CL 95 100   CO2 31* 27   BUN 23* 27*   CREATININE 11.0* 12.2*   CALCIUM 9.7 8.6*   ALBUMIN 3.7 3.1*   PROT 9.0* 7.4   BILITOT 0.5 0.5   ALKPHOS 267* 230*   ALT 14 11   AST 25 20   MG 2.1 2.0   PHOS 2.6*  --        Significant Imaging:  I have reviewed all pertinent imaging results/findings within the past 24 hours.      ABG  No results for input(s): PH, PO2, PCO2, HCO3, BE in the last 168 hours.  Assessment/Plan:     Renal/  ESRD on hemodialysis  Currently on HD MWF    - Pt was sent from dialysis to the ED.   - Nephrology consulted for inpatient management of dialysis    Endocrine  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, without long-term current use of insulin  Currently controlled with diet with his last HgbA1c being 4.1.  Currently on a clear liquid diet.     - Advance diet as tolerated to a diabetic renal diet.   - Glucose on admit 107    GI  * Hematemesis  55 y/o male with a hx of hematemesis and  gastritis who presented with multiple episodes of coffee ground hematemesis. Hemodynamically stable. Received Protonix 80mg IV.      - Protonix 40mg bid  - Trend H/H  - Transfuse HgB <7   - Zofran prn   - Diet: clear liquids.   - Resume home medications once able to tolerate PO intake  - If massive hematemesis, low threshold for intubation for airway protection  - Needs outpatient gastric emptying study. No EGD while inpatient   - Stable for step down      Critical Care Daily Checklist:    A: Awake: RASS Goal/Actual Goal: RASS Goal: (P) 0-->alert and calm  Actual: Maddox Agitation Sedation Scale (RASS): (P) Alert and calm   B: Spontaneous Breathing Trial Performed?     C: SAT & SBT Coordinated?  n/a                    D: Delirium: CAM-ICU Overall CAM-ICU: (P) Negative   E: Early Mobility Performed? No   F: Feeding Goal:    Status:     Current Diet Order   Procedures    Diet clear liquid      AS: Analgesia/Sedation Prn    T: Thromboembolic Prophylaxis scds   H: HOB > 300 Yes   U: Stress Ulcer Prophylaxis (if needed) Protonix   G: Glucose Control Diet as tolerated   B: Bowel Function Stool Occurrence: 0   I: Indwelling Catheter (Lines & Arcos) Necessity Peripheral IVs   D: De-escalation of Antimicrobials/Pharmacotherapies As clinically indicated    Plan for the day/ETD Step down     Code Status:  Family/Goals of Care: Full Code          Critical care was time spent personally by me on the following activities: development of treatment plan with patient or surrogate and bedside caregivers, discussions with consultants, evaluation of patient's response to treatment, examination of patient, ordering and performing treatments and interventions, ordering and review of laboratory studies, ordering and review of radiographic studies, pulse oximetry, re-evaluation of patient's condition. This critical care time did not overlap with that of any other provider or involve time for any procedures.     Rick Cabral,  MD  Critical Care Medicine  [facility]

## 2019-08-20 NOTE — TREATMENT PLAN
GI Treatment Plan    Vaughn Retana is a 54 y.o. male admitted to hospital 8/19/2019 (Hospital Day: 2) due to Hematemesis.     Interval History  Admitted to ICU. Patient reports feeling better this morning. No vomiting overnight. Denies any bowel movements. Feeling hungry and wants to eat.  Hgb did drop to 6.8 yesterday evening, received 1U PRBC, and Hgb now 9s. Unclear if 6.8 was lab error especially as vomiting has resolved and more than adequate response with transfusion.    Objective  Temp:  [97 °F (36.1 °C)-98.8 °F (37.1 °C)] 98.4 °F (36.9 °C) (08/20 0700)  Pulse:  [] 73 (08/20 0817)  BP: (126-182)/() 146/85 (08/20 0800)  Resp:  [10-37] 16 (08/20 0817)  SpO2:  [95 %-100 %] 95 % (08/20 0817)    General: Alert, Oriented x3, no distress  Abdomen: Normoactive bowel sounds. Non-distended. Normal tympany. Soft. Non-tender. No peritoneal signs.    Laboratory    Recent Labs   Lab 08/19/19  1840 08/19/19  2337 08/20/19  0434   HGB 6.8* 9.5* 9.1*       Lab Results   Component Value Date    WBC 8.36 08/20/2019    HGB 9.1 (L) 08/20/2019    HCT 28.3 (L) 08/20/2019    MCV 98 08/20/2019     08/20/2019       Lab Results   Component Value Date     08/20/2019    K 3.2 (L) 08/20/2019     08/20/2019    CO2 27 08/20/2019    BUN 27 (H) 08/20/2019    CREATININE 12.2 (H) 08/20/2019    CALCIUM 8.6 (L) 08/20/2019    ANIONGAP 17 (H) 08/20/2019    ESTGFRAFRICA 4.7 (A) 08/20/2019    EGFRNONAA 4.1 (A) 08/20/2019       Lab Results   Component Value Date    ALT 11 08/20/2019    AST 20 08/20/2019    ALKPHOS 230 (H) 08/20/2019    BILITOT 0.5 08/20/2019       Lab Results   Component Value Date    INR 1.1 08/20/2019    INR 1.0 08/19/2019    INR 1.0 06/21/2019       Plan  - Recommend IV PPI bid (can be transitioned to PO when able to tolerate)  - Serial H/H  - Recommend scheduled zofran. If no relief, consider reglan.  - Keep on CLD today  - Keep head of bed up  - If massive hematemesis, low threshold for  intubation for airway protection  - No need for EGD at this time as most recent one from 8/2 with only grade A esophagitis and vomiting has resolved, but will continue to monitor  - Needs outpatient gastric emptying study    We will continue to follow.    Thank you for involving us in the care of Vaughn Retana. Please call with any additional questions, concerns or changes in the patient's clinical status.    Fabio Jones MD  Gastroenterology Fellow  Spectralink: [phone]

## 2019-08-20 NOTE — PROGRESS NOTES
Consult received and reviewed. Full consult note to follow.   Orders for HD placed for today. The patient is a MWF dialysis patient. Last HD on 8/16.       FLORIDALMA Benavidez DNP, APRN, FNP-C  Department of Nephrology  [facility]  Pager: [phone]

## 2019-08-20 NOTE — NURSING
Pt arrived to unit via stretcher from ED- awake and alert, 1 unit PRBC infusing. Consent in chart. CCS notified and at bedside. Sats 96% on room air. BP elevated SBP 160s-170's, team aware. Pt dry heaving, c/o nausea, HOB 35 degrees. Orders given for 8mg IV Zofran now. Wctm.

## 2019-08-20 NOTE — SUBJECTIVE & OBJECTIVE
Past Medical History:   Diagnosis Date    Amputation stump pain 9/10/2013    Aspiration pneumonia 7/27/2015    Asterixis 11/8/2016    C. difficile colitis 8/7/2015    Cholelithiasis without obstruction 8/25/2015    Chronic diastolic heart failure     2-23-17   1 - Low normal to mildly depressed left ventricular systolic function (EF 50-55%).    2 - Right ventricular enlargement with mildly depressed systolic function.    3 - Left ventricular diastolic dysfunction.    4 - Right atrial enlargement.    5 - Severe tricuspid regurgitation.    6 - Pulmonary hypertension. The estimated PA systolic pressure is 86 mmHg.    7 - Increased central venous pressure.    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    Metabolic acidosis, IAG, reduced excretion of inorganic acids     Myoclonic jerking 9/20/2016    Noncompliance with medication regimen 12/4/2018    Secondary hyperparathyroidism (of renal origin)     Secondary pulmonary hypertension 3/23/2017    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    TB lung, latent 08/25/2015    Negative Quantiferon Gold 3-23-17       Past Surgical History:   Procedure Laterality Date    AMPUTATION, BELOW KNEE Left 12/18/2013    Performed by Elgin Houston MD at [facility] OR 1ST FLR    COLONOSCOPY      COLONOSCOPY N/A 4/4/2017    Performed by Walker Stern MD at [facility] ENDO (2ND FLR)    EGD (ESOPHAGOGASTRODUODENOSCOPY) N/A 3/7/2019    Performed by  Man Galicia MD at [facility] ENDO (2ND FLR)    EGD (ESOPHAGOGASTRODUODENOSCOPY) N/A 6/12/2018    Performed by Man Galicia MD at [facility] ENDO (2ND FLR)    ESOPHAGOGASTRODUODENOSCOPY (EGD) N/A 5/22/2018    Performed by Ke Sparks MD at [facility] ENDO (2ND FLR)    ESOPHAGOGASTRODUODENOSCOPY (EGD) N/A 3/16/2018    Performed by Kevin De La Paz MD at [facility] ENDO (2ND FLR)    ESOPHAGOGASTRODUODENOSCOPY (EGD) N/A 3/8/2018    Performed by Man Galicia MD at [facility] ENDO (2ND FLR)    ESOPHAGOGASTRODUODENOSCOPY (EGD) N/A 4/4/2017    Performed by Walker Stern MD at [facility] ENDO (2ND FLR)    ESOPHAGOGASTRODUODENOSCOPY (EGD) N/A 10/17/2014    Performed by Man Galicia MD at [facility] (2ND FLR)    FISTULOGRAM Right 9/18/2014    Performed by Grayson Hubbard MD at [facility] CATH LAB    FOOT AMPUTATION THROUGH METATARSAL      left foot    LEG AMPUTATION THROUGH KNEE  12/18/2013    left BKA    R AVF  9/12/12    UPPER GASTROINTESTINAL ENDOSCOPY         Review of patient's allergies indicates:   Allergen Reactions    Fosrenol [lanthanum] Nausea And Vomiting     Nausea and vomiting     Current Facility-Administered Medications   Medication Frequency    0.9%  NaCl infusion (for blood administration) Q24H PRN    0.9%  NaCl infusion Once    ondansetron injection 8 mg Q4H PRN    pantoprazole EC tablet 40 mg BID AC    sodium chloride 0.9% flush 10 mL PRN     Family History     Problem Relation (Age of Onset)    Alcohol abuse Maternal Grandmother    Diabetes Brother, Maternal Grandfather    Early death Mother    Heart disease Father    Hyperlipidemia Father    Hypertension Father, Sister    Kidney disease Father        Tobacco Use    Smoking status: Former Smoker     Packs/day: 1.00     Years: 10.00     Pack years: 10.00    Smokeless tobacco: Never Used   Substance and Sexual Activity    Alcohol use: No    Drug use: No    Sexual activity: Yes     Partners: Female     Birth control/protection: None     Review of Systems    Constitutional: Negative for chills, diaphoresis, fatigue and fever.   HENT: Negative for sore throat.    Respiratory: Negative for chest tightness, shortness of breath and wheezing.    Cardiovascular: Negative for chest pain and palpitations.   Gastrointestinal: Positive for nausea and vomiting. Negative for abdominal distention, abdominal pain, anal bleeding and blood in stool.   Endocrine: Negative for cold intolerance and heat intolerance.   Genitourinary: Positive for decreased urine volume. Negative for hematuria.   Musculoskeletal: Positive for gait problem. Negative for arthralgias and joint swelling.   Skin: Negative for pallor and rash.   Neurological: Negative for dizziness, weakness, light-headedness, numbness and headaches.   Psychiatric/Behavioral: Negative for hallucinations. The patient is not nervous/anxious.      Objective:     Vital Signs (Most Recent):  Temp: 97.9 °F (36.6 °C) (08/20/19 1230)  Pulse: 73 (08/20/19 1300)  Resp: 20 (08/20/19 1300)  BP: 120/65 (08/20/19 1300)  SpO2: 100 % (08/20/19 1300)  O2 Device (Oxygen Therapy): room air (08/20/19 1300) Vital Signs (24h Range):  Temp:  [97 °F (36.1 °C)-98.8 °F (37.1 °C)] 97.9 °F (36.6 °C)  Pulse:  [] 73  Resp:  [9-37] 20  SpO2:  [88 %-100 %] 100 %  BP: ()/() 120/65     Weight: 60 kg (132 lb 4.4 oz) (08/20/19 0700)  Body mass index is 22.01 kg/m².  Body surface area is 1.66 meters squared.    I/O last 3 completed shifts:  In: 524.3 [Blood:524.3]  Out: 900 [Emesis/NG output:900]    Physical Exam   Constitutional: He is oriented to person, place, and time. He appears well-developed and well-nourished. No distress.   HENT:   Head: Normocephalic and atraumatic.   Right Ear: External ear normal.   Left Ear: External ear normal.   Mouth/Throat: Mucous membranes are pale and dry.   Eyes: Right eye exhibits no discharge. Left eye exhibits no discharge. No scleral icterus.   Neck: Neck supple.   Cardiovascular: Normal rate and regular  rhythm.   No murmur heard.  Pulmonary/Chest: Effort normal and breath sounds normal. No respiratory distress. He has no wheezes.   Abdominal: Soft. Bowel sounds are normal. He exhibits no distension. There is no tenderness. There is no rebound and no guarding.   Musculoskeletal: He exhibits deformity. He exhibits no edema or tenderness.   L BKA   Neurological: He is alert and oriented to person, place, and time.   Skin: Skin is warm and dry. No rash noted.   Psychiatric: He has a normal mood and affect. His behavior is normal.   Nursing note and vitals reviewed.      Significant Labs:  CBC:   Recent Labs   Lab 08/20/19 0434   WBC 8.36   RBC 2.89*   HGB 9.1*   HCT 28.3*      MCV 98   MCH 31.5*   MCHC 32.2     CMP:   Recent Labs   Lab 08/20/19 0434   GLU 74   CALCIUM 8.6*   ALBUMIN 3.1*   PROT 7.4      K 3.2*   CO2 27      BUN 27*   CREATININE 12.2*   ALKPHOS 230*   ALT 11   AST 20   BILITOT 0.5

## 2019-08-20 NOTE — PLAN OF CARE
Problem: Adult Inpatient Plan of Care  Goal: Plan of Care Review  Outcome: Ongoing (interventions implemented as appropriate)  Pt remains drowsy but easily aroused, AOx3, FLORES, afebrile. Denies nausea, no further vomiting. SR 70s-80s, -160s. Labetalol 10mg IV given x1 dose. Sats 97-99% on RA. Anuric. No BM overnight, remains NPO. CBC stable- 9.1/28. Consulting Nephrology for possible HD today. R AV fistula intact. L BKA healed, BOONE. Prosthesis in room. No c/o pain. POC reviewed with patient, all questions/concerns addressed, wctm.

## 2019-08-20 NOTE — HPI
The patient is a 54 year-old AA male with a medical history of recurrent hematemesis, Grade D Esophagitis, ESRD, DM and CHF who presented to the hospital with complaints of hematemesis x1 day on 8/19. The patient endorsed multiple episodes of blood tinged emesis on Monday causing him to miss his outpatient HD appointment. According to the records, the patient has been hospitalized 4 times at OSH for complaints of hematemesis (ie 6/21, 8/1, 8/3, and 8/12). During one of his previous admissions, he had a EGD performed on 8/2 which revealed grade D esophagitis without active bleeding. In the ED, workup was significant for HgB 9.1 (BL 11-13), CXR did not show any acute intrathoracic findings. Metabolic panel significant for creatinine of 11.9. He currently denies c/o headaches, feeling lightheaded, blurry vision, abdominal or epigastric pain and melena. He was admitted to critical care for significant hematemesis and for airway protection. The patient is a MWF dialysis patient at [facility]. He typically dialyzes for 3.5 hrs. He states that he was last dialyze on 8/16. Nephrology consulted for management of ESRD and HD treatment.

## 2019-08-20 NOTE — PROGRESS NOTES
Progress Note  Hospital Medicine    Admit Date: 8/19/2019  Length of Stay:  LOS: 1 day     SUBJECTIVE:         Follow-up For:  Hematemesis    HPI/Interval history (See H&P for complete P,F,SHx) :     Over view    Patient is a 55 y/o male with a mhx of recurrent hematemesis, esophagitis, ESRD, DM and CHF who presented to the hospital with complaints of hematemesis for one day. He reports that last night he started throwing up blood and had multiple episodes today prior to arrival, (600cc per EMS). Upon admission he did have one episode of coffee ground emesis. He denies any precipitating event or trauma. He also denies any headaches, feeling lightheaded, blurry vision, abdominal or epigastric pain and melena. He was given octreotide and had relief with zofran. He has now regained his appetite and feels much better. Workup was significant for HgB 9.1 (BL 11-13), CXR did not show any acute intrathoracic findings. Metabolic panel significant for creatinine of 11.9 but he is due for HD today (typically MWF).     Of note he was discharged from [facility] for hematemesis on 8/12/2019. EGD during that admission showed grade D esophagitis without active bleeding. He is currently hemodynamically stable and has no current complaints. Pt will be admitted to the critical care unit for significant hematemesis + airway watch.              Hospital/ICU Course:  Admitted to the critical care unit for airway watch due to hematemesis. He was given zofran and a one time dose of protonix 80mg. Currently being treated with protonix 40mg bid.      Interval History/Significant Events:   Following clear liquids, patient had another episode of coffee ground hematemesis. No feelings of headache, dizziness, abdominal pain or chest pain.   HgB decreased to 6.8, 1 unit pRBCs given        Review of Systems: List if applicable  Pain scale: 0/10  Constitutional: Negative for chills, diaphoresis, fatigue and fever.   HENT: Negative for sore throat.     Respiratory: Negative for chest tightness, shortness of breath and wheezing.    Cardiovascular: Negative for chest pain and palpitations.   Gastrointestinal: Positive for nausea and vomitin - improved Negative for abdominal distention, abdominal pain, anal bleeding and blood in stool.   Endocrine: Negative for cold intolerance and heat intolerance.   Genitourinary: Negative for hematuria.   Musculoskeletal: Negative for arthralgias and joint swelling.   Skin: Negative for pallor and rash.   Neurological: Negative for dizziness, weakness, light-headedness, numbness and headaches.   Psychiatric/Behavioral: Negative for hallucinations. The patient is not nervous/anxious.      OBJECTIVE:     Vital Signs Range (Last 24H):  Temp:  [97 °F (36.1 °C)-98.8 °F (37.1 °C)]   Pulse:  []   Resp:  [9-37]   BP: ()/()   SpO2:  [88 %-100 %]     Physical Exam:  General- Patient alert and oriented x3   HEENT- PERRLA, EOMI, OP clear  Neck- No JVD, Lymphadenopathy, Thyromegaly  CV- Regular rate and rhythm, No Murmur/cheryl/rubs  Resp- Lungs CTA Bilaterally, No increased WOB  Abdomen- Non tender/non-distended, BS normoactive x4 quads, no HSM  Extrem- No cyanosis, clubbing, edema. Right upper extremity AV fistula thrill present.  Left below-knee amputation (ambulates with prosthesis)  Skin- No rashes, lesions, ulcers  Neuro- Strength 5/5 flexors/extensors,Intact sensation to light touch grossly      Medications:  Medication list was reviewed and changes noted under Assessment/Plan.      Current Facility-Administered Medications:     0.9%  NaCl infusion (for blood administration), , Intravenous, Q24H PRN, Lito Germain MD    0.9%  NaCl infusion, , Intravenous, Once, Glenis Benavidez DNP, FNP-C, Stopped at 08/20/19 0930    ondansetron injection 8 mg, 8 mg, Intravenous, Q4H PRN, Lito Germain MD, 8 mg at 08/20/19 1538    pantoprazole EC tablet 40 mg, 40 mg, Oral, BID AC, Sofia Marley MD    sodium chloride  0.9% flush 10 mL, 10 mL, Intravenous, PRN, Tay Grewal MD    sodium chloride, ondansetron, sodium chloride 0.9%    Laboratory/Diagnostic Data:  Reviewed and noted in plan where applicable- Please see chart for full lab data.    Recent Labs   Lab 08/19/19  1840 08/19/19  2337 08/20/19  0434   WBC 5.65 13.98* 8.36   HGB 6.8* 9.5* 9.1*   HCT 20.4* 29.5* 28.3*    192 208       Recent Labs   Lab 08/19/19  0756 08/20/19  0434 08/20/19  1008    144  --    K 3.5 3.2*  --    CL 95 100  --    CO2 31* 27  --    BUN 23* 27*  --    CREATININE 11.0* 12.2*  --     74  --    CALCIUM 9.7 8.6*  --    MG 2.1 2.0  --    PHOS 2.6*  --  1.5*   LIPASE 87*  --   --        Recent Labs   Lab 08/19/19  0756 08/20/19  0434   ALKPHOS 267* 230*   ALT 14 11   AST 25 20   ALBUMIN 3.7 3.1*   PROT 9.0* 7.4   BILITOT 0.5 0.5   INR 1.0 1.1        Microbiology labs for the last week  Microbiology Results (last 7 days)     ** No results found for the last 168 hours. **           Imaging Results          X-Ray Chest AP Portable (Final result)  Result time 08/19/19 08:37:23    Final result by Jatin Duncan MD (08/19/19 08:37:23)             Impression:      1. No acute radiographic findings in the chest on this single view.      Electronically signed by: Jatin Duncan MD  Date:    08/19/2019  Time:    08:37           Narrative:    EXAMINATION:  XR CHEST AP PORTABLE    CLINICAL HISTORY:  gi bleed;    TECHNIQUE:  Single frontal view of the chest was performed.    COMPARISON:  Radiograph 06/21/2019.    FINDINGS:  Cardiac monitoring leads project over the bilateral hemithoraces.  Mediastinal structures are midline.  Hilar contours are unremarkable.  Cardiac silhouette is magnified by AP technique.  Lung volumes are symmetric.  No consolidation.  No pneumothorax or pleural effusions.  No free air beneath the diaphragm.  Hypertrophic changes of the left AC joint noted.  No acute osseous abnormalities.  Vascular stent  "projects over the right subclavian region.                              Estimated body mass index is 22.01 kg/m² as calculated from the following:    Height as of this encounter: 5' 5" (1.651 m).    Weight as of this encounter: 60 kg (132 lb 4.4 oz).    I & O (Last 24H):    Intake/Output Summary (Last 24 hours) at 8/20/2019 1645  Last data filed at 8/20/2019 1230  Gross per 24 hour   Intake 1024.25 ml   Output 2059 ml   Net -1034.75 ml       Estimated Creatinine Clearance: 5.8 mL/min (A) (based on SCr of 12.2 mg/dL (H)).    ASSESSMENT/PLAN:     Active Problems:      Active Hospital Problems    Diagnosis  POA    *Hematemesis [K92.0]  hx of hematemesis and gastritis who presented with multiple episodes of coffee ground hematemesis. Hemodynamically stable. Received Protonix 80mg IV. Multiple episodes prior to presentation with the ED. EGD in 3/2019 showed grade D esophagitis. Also has a history of non-bleeding gastric ulcer (not seen on most recent EGDs this year). He was admitted to [facility] twice early August for same, EGD 8/2 showed grade A esophagitis with no active bleeding Status post Gastroenterology evaluation. Hgb did drop to 6.8 received 1U PRBC, and Hgb now 9s. Unclear if 6.8 was lab error      - Protonix 40mg bid  - Trend H/H  - HgB decreased to 6.8, 1 unit pRBCs given on 08/19/2019  - Zofran prn   - Diet: clear liquids.   - Resume home medications once able to tolerate PO intake    08/20/2019  Recommend IV PPI bid (can be transitioned to PO when able to tolerate)  - Serial H/H  - Recommend scheduled zofran per GI recommendation, patient reports improvement in nausea  CLD today  - No need for octreotide as no history or concern for variceal bleed  - No need for EGD at this time as most recent one from 8/2 with only grade A esophagitis, but will continue to monitor  - Needs outpatient gastric emptying study    Yes    Anemia [D64.9] anemia of chronic disease at baseline with acute drop in hemoglobin to 6.8 " with hematemesis.  Transfused as above monitor  Unknown    Hypophosphatemia [E83.39] 1.5.  Nephrology following. phosphate binders for now  Unknown    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, without long-term current use of insulin [E11.29]Currently controlled with diet with his last HgbA1c being 4.1.  Currently on a clear liquid diet.      - Advance diet as tolerated to a diabetic renal diet.  Yes     Chronic    Renovascular hypertension [I15.0] blood pressure controlled off medication  Yes     Chronic    Chronic kidney disease-mineral and bone disorder [N18.9, E83.9, M89.9]  Yes     Chronic    Peripheral vascular disease in diabetes mellitus [E11.51]  Yes     Chronic    ESRD on hemodialysis [N18.6, Z99.2] continue with dialysis per Nephrology status post hemodialysis on 08/20/2019  Not Applicable     Chronic     MWF at [facility]      Secondary hyperparathyroidism of renal origin [N25.81]  Yes     Chronic      Resolved Hospital Problems   No resolved problems to display.         Disposition- home    DVT prophylaxis addressed with:  Kath Cordoba MD  Attending Staff Physician  [facility]  pager- [phone] Grxybyxynng - [phone]

## 2019-08-20 NOTE — ASSESSMENT & PLAN NOTE
55 y/o male with a hx of hematemesis and gastritis who presented with multiple episodes of coffee ground hematemesis. Hemodynamically stable. Received Protonix 80mg IV.      - Protonix 40mg bid  - Trend H/H  - Transfuse HgB <7   - Zofran prn   - Diet: clear liquids.   - Resume home medications once able to tolerate PO intake  - If massive hematemesis, low threshold for intubation for airway protection  - Needs outpatient gastric emptying study. No EGD while inpatient   - Stable for step down

## 2019-08-20 NOTE — NURSING
CBC now 9.5/29.5 after 1 unit RBCs. Pt intermittently hypertensive, SBP 160s/170s. CCS notified. Stated will place 1x order for Labetatol IV.     0545: CCS notified of am labs- K 3.2, Cr 12.2. No replacement orders given, will consult Nephrology, pt did not receive HD yesterday. Aware -160s, no new orders given.

## 2019-08-20 NOTE — CONSULTS
Consult received and reviewed. Full consult note to follow.   Orders for HD placed for today. The patient is a MWF dialysis patient. Last HD on 8/16.       FLORIDALMA Benavidez DNP, APRN, FNP-C  Department of Nephrology  [facility]  Pager: [phone]

## 2019-08-20 NOTE — SUBJECTIVE & OBJECTIVE
Interval History/Significant Events:   Following clear liquids, patient had another episode of coffee ground hematemesis. No feelings of headache, dizziness, abdominal pain or chest pain.   HgB decreased to 6.8, 1 unit pRBCs given.     Review of Systems   Constitutional: Negative for chills, diaphoresis, fatigue and fever.   HENT: Negative for sore throat.    Respiratory: Negative for chest tightness, shortness of breath and wheezing.    Cardiovascular: Negative for chest pain and palpitations.   Gastrointestinal: Positive for nausea and vomiting. Negative for abdominal distention, abdominal pain, anal bleeding and blood in stool.   Endocrine: Negative for cold intolerance and heat intolerance.   Genitourinary: Negative for hematuria.   Musculoskeletal: Negative for arthralgias and joint swelling.   Skin: Negative for pallor and rash.   Neurological: Negative for dizziness, weakness, light-headedness, numbness and headaches.   Psychiatric/Behavioral: Negative for hallucinations. The patient is not nervous/anxious.      Objective:     Vital Signs (Most Recent):  Temp: 97 °F (36.1 °C) (08/20/19 0300)  Pulse: 73 (08/20/19 0700)  Resp: 12 (08/20/19 0700)  BP: (!) 140/83 (08/20/19 0700)  SpO2: 97 % (08/20/19 0700) Vital Signs (24h Range):  Temp:  [97 °F (36.1 °C)-98.8 °F (37.1 °C)] 97 °F (36.1 °C)  Pulse:  [] 73  Resp:  [10-37] 12  SpO2:  [95 %-100 %] 97 %  BP: (126-182)/() 140/83   Weight: 60 kg (132 lb 4.4 oz)  Body mass index is 22.01 kg/m².      Intake/Output Summary (Last 24 hours) at 8/20/2019 0809  Last data filed at 8/20/2019 0000  Gross per 24 hour   Intake 524.25 ml   Output 500 ml   Net 24.25 ml       Physical Exam   Constitutional: He is oriented to person, place, and time. He appears well-developed and well-nourished. No distress.   HENT:   Head: Normocephalic and atraumatic.   Mouth/Throat: Mucous membranes are pale and dry.   Neck: Neck supple.   Cardiovascular: Normal rate and regular rhythm.    No murmur heard.  Pulmonary/Chest: Effort normal and breath sounds normal. No respiratory distress. He has no wheezes.   Abdominal: Soft. Bowel sounds are normal. He exhibits no distension. There is no tenderness. There is no rebound and no guarding.   Musculoskeletal: He exhibits no edema or tenderness.   Left lower extremity amputation   Neurological: He is alert and oriented to person, place, and time.   Skin: Skin is warm and dry. No rash noted.   Psychiatric: He has a normal mood and affect. His behavior is normal.   Nursing note and vitals reviewed.      Vents:     Lines/Drains/Airways     Drain                 Hemodialysis AV Fistula Right upper arm -- days         Hemodialysis AV Fistula Right upper arm -- days          Peripheral Intravenous Line                 Peripheral IV - Single Lumen 08/19/19 0753 18 G Left Antecubital 1 day         Peripheral IV - Single Lumen 08/19/19 0824 20 G Left Hand less than 1 day              Significant Labs:    CBC/Anemia Profile:  Recent Labs   Lab 08/19/19  1840 08/19/19  2337 08/20/19  0434   WBC 5.65 13.98* 8.36   HGB 6.8* 9.5* 9.1*   HCT 20.4* 29.5* 28.3*    192 208   MCV 99* 100* 98   RDW 14.5 14.5 14.6*        Chemistries:  Recent Labs   Lab 08/19/19  0756 08/20/19  0434    144   K 3.5 3.2*   CL 95 100   CO2 31* 27   BUN 23* 27*   CREATININE 11.0* 12.2*   CALCIUM 9.7 8.6*   ALBUMIN 3.7 3.1*   PROT 9.0* 7.4   BILITOT 0.5 0.5   ALKPHOS 267* 230*   ALT 14 11   AST 25 20   MG 2.1 2.0   PHOS 2.6*  --        Significant Imaging:  I have reviewed all pertinent imaging results/findings within the past 24 hours.

## 2019-08-21 PROBLEM — K92.0 HEMATEMESIS: Status: RESOLVED | Noted: 2019-01-01 | Resolved: 2019-01-01

## 2019-08-21 PROBLEM — E83.39 HYPOPHOSPHATEMIA: Status: RESOLVED | Noted: 2019-01-01 | Resolved: 2019-01-01

## 2019-08-21 NOTE — PLAN OF CARE
Problem: Adult Inpatient Plan of Care  Goal: Plan of Care Review  Outcome: Ongoing (interventions implemented as appropriate)     08/21/19 1500   Plan of Care Review   Plan of Care Reviewed With patient     Pt free of falls/trauma/injuries. Bed in low position, wheels locked, and call light within reach. Skin integrity remains unchanged. No noted/reported occurrences of emesis during shift; schedule antiemetic given as scheduled. Renal diet started, tray sent down with pt to dialysis.VSS and afebrile.  No distress noted/reported at this time. Will continue to monitor.

## 2019-08-21 NOTE — PLAN OF CARE
Problem: Physical Therapy Goal  Goal: Physical Therapy Goal  Outcome: Outcome(s) achieved Date Met: 08/21/19  Rip and D/C from acute PT services    Yimi Mark PT,DPT  8/21/2019

## 2019-08-21 NOTE — TREATMENT PLAN
Gastroenterology Treatment Plan    Interval History  Continues to have some brown emesis, not coffee ground or bloody  Hgb stable 10s    Plan  - Recommend IV PPI bid (can be transitioned to PO bid when able to tolerate for 8-12 weeks)  - Serial H/H  - Consider scheduled zofran inpatient. If no relief, consider reglan.  - Advance diet as tolerated. Recommend small, frequent meals, low fat.  - Keep head of bed up  - No need for EGD at this time as most recent one from 8/2 with only grade A esophagitis and no evidence of GI bleeding  - Needs outpatient gastric emptying study    We will arrange the following follow-up  - Clinic follow-up: follow-up in 1-2 months with Fabio Jones MD.   - Procedure follow-up: see above plan of care.  - Medication Recommendations: see above plan of care.    [facility] Contact  - Clinic Phone: [phone]  - Procedure Schedulers: [phone]  - Clinic Fax: [phone]    Thank you for involving us in the care of Vaughn Retana. We are signing-off. Please call with any additional questions, concerns or changes in the patient's clinical status.      Fabio Jones MD  Gastroenterology Fellow, PGY4  [facility]

## 2019-08-21 NOTE — PLAN OF CARE
Problem: Adult Inpatient Plan of Care  Goal: Plan of Care Review  Outcome: Ongoing (interventions implemented as appropriate)  Patient remained in stable condition through shift. Remained free of falls and other injuries. Denied any pain or discomfort. Patient had multiple episodes of brown emesis last night, Hospital Medicine B notified. Bed in locked and lowest position, call light in reach, all questions answered, declines any further needs at this time. Will continue to monitor.

## 2019-08-21 NOTE — DISCHARGE SUMMARY
[facility] Hospital Medicine  Discharge Summary      Patient Name: Vaughn Retana  MRN: [id number]  Admission Date: 8/19/2019  Hospital Length of Stay: 2 days  Discharge Date and Time:  08/21/2019 12:20 PM  Attending Physician: Gavino Cordoba MD   Discharging Provider: Gavino Cordoba MD  Primary Care Provider: Yvette Zelaya NP    [facility]: [facility] Gavino Cordoba MD    HPI: Patient is a 55 y/o male with a mhx of recurrent hematemesis, esophagitis, ESRD, DM and CHF who presented to the hospital with complaints of hematemesis for one day. He reports that last night he started throwing up blood and had multiple episodes today prior to arrival, (600cc per EMS). Upon admission he did have one episode of coffee ground emesis. He denies any precipitating event or trauma. He also denies any headaches, feeling lightheaded, blurry vision, abdominal or epigastric pain and melena. He was given octreotide and had relief with zofran. He has now regained his appetite and feels much better. Workup was significant for HgB 9.1 (BL 11-13), CXR did not show any acute intrathoracic findings. Metabolic panel significant for creatinine of 11.9 but he is due for HD today (typically MWF).     Of note he was discharged from [facility] for hematemesis on 8/12/2019. EGD during that admission showed grade D esophagitis without active bleeding. He is currently hemodynamically stable and has no current complaints. Pt will be admitted to the critical care unit for significant hematemesis + airway watch.              Hospital/ICU Course:  Admitted to the critical care unit for airway watch due to hematemesis. He was given zofran and a one time dose of protonix 80mg. Currently being treated with protonix 40mg bid.      Interval History/Significant Events:   Following clear liquids, patient had another episode of coffee ground hematemesis. No feelings of headache, dizziness, abdominal pain or  chest pain.   HgB decreased to 6.8, 1 unit pRBCs given    * No surgery found *      Hospital Course:                Active Hospital Problems     Diagnosis   POA    *Hematemesis [K92.0]  hx of hematemesis and gastritis who presented with multiple episodes of coffee ground hematemesis. Hemodynamically stable. Received Protonix 80mg IV. Multiple episodes prior to presentation with the ED. EGD in 3/2019 showed grade D esophagitis. Also has a history of non-bleeding gastric ulcer (not seen on most recent EGDs this year). He was admitted to [facility] twice early August for same, EGD 8/2 showed grade A esophagitis with no active bleeding Status post Gastroenterology evaluation. Hgb did drop to 6.8 received 1U PRBC, and Hgb now 9s. Unclear if 6.8 was lab error      - Protonix 40mg bid  - Trend H/H  - HgB decreased to 6.8, 1 unit pRBCs given on 08/19/2019    - Zofran prn   - tolerating renal diet.   - Resume home medications once able to tolerate PO intake     08/20/2019  Recommend IV PPI bid (can be transitioned to PO when able to tolerate)  - Serial H/H  - Recommend scheduled zofran per GI recommendation, patient reports improvement in nausea  CLD today  - No need for octreotide as no history or concern for variceal bleed  - No need for EGD at this time as most recent one from 8/2 with only grade A esophagitis, but will continue to monitor  - Needs outpatient gastric emptying study      Yes    Anemia [D64.9] anemia of chronic disease at baseline with acute drop in hemoglobin to 6.8 with hematemesis.  Transfused as above monitor. Hemoglobin stable at 10  3 on 08/21/2019    Nausea/vomiting- improved with Zofran around the clock and Reglan 5 mg t.i.d. with meals.  Being discharged with Reglan for 5 days   Unknown    Hypophosphatemia [E83.39] .   resolved . Nephrology following. phosphate binders for now   Unknown    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, without long-term current use of insulin  [E11.29]Currently controlled with diet with his last HgbA1c being 4.1.  Currently on a clear liquid diet.      - Advance diet as tolerated to a diabetic renal diet.   Yes       Chronic    Renovascular hypertension [I15.0] blood pressure controlled off medication   Yes       Chronic    Chronic kidney disease-mineral and bone disorder [N18.9, E83.9, M89.9]   Yes       Chronic    Peripheral vascular disease in diabetes mellitus [E11.51]   Yes       Chronic    ESRD on hemodialysis [N18.6, Z99.2] continue with dialysis per Nephrology status post hemodialysis on 08/20/2019   Not Applicable       Chronic       MWF at [facility]       Secondary hyperparathyroidism of renal origin [N25.81]   Yes       Chronic       Patient being discharged to nursing home      Consults:   Consults (From admission, onward)        Status Ordering Provider     Inpatient consult to Critical Care Medicine  Once     Provider:  (Not yet assigned)    Completed ANTONIO LEUNG     Inpatient consult to Gastroenterology  Once     Provider:  (Not yet assigned)    Completed ANTONIO LEUNG     Inpatient consult to Nephrology  Once     Provider:  (Not yet assigned)    Completed JENNIFER DIMAS          Final Active Diagnoses:    Diagnosis Date Noted POA    PRINCIPAL PROBLEM:  Hematemesis [K92.0] 08/19/2019 Yes    Acute blood loss anemia [D62]  Yes    Anemia [D64.9] 08/20/2019 Yes    Hypophosphatemia [E83.39] 08/20/2019 Yes    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, without long-term current use of insulin [E11.29] 05/21/2018 Yes     Chronic    Renovascular hypertension [I15.0] 03/16/2018 Yes     Chronic    Chronic kidney disease-mineral and bone disorder [N18.9, E83.9, M89.9] 07/28/2017 Yes     Chronic    Peripheral vascular disease in diabetes mellitus [E11.51] 07/18/2013 Yes     Chronic    ESRD on hemodialysis [N18.6, Z99.2] 02/07/2013 Not Applicable     Chronic    Secondary hyperparathyroidism of renal  origin [N25.81] 09/17/2012 Yes     Chronic      Problems Resolved During this Admission:      Discharged Condition: fair    Disposition: Nursing Facility    Follow Up:    Patient Instructions:   No discharge procedures on file.  Medications:  Reconciled Home Medications:      Medication List      Start taking these medications    metoclopramide HCl 10 MG tablet  Commonly known as:  REGLAN  Take 0.5 tablets (5 mg total) by mouth 3 (three) times daily before meals. for 5 days        Continue taking these medications    atorvastatin 80 MG tablet  Commonly known as:  LIPITOR  Take 1 tablet (80 mg total) by mouth every evening.     carvedilol 25 MG tablet  Commonly known as:  COREG  Take 1 tablet (25 mg total) by mouth 2 (two) times daily with meals.     dicyclomine 10 MG capsule  Commonly known as:  BENTYL  Take 1 capsule (10 mg total) by mouth before meals as needed (TID PRN). For belching     ondansetron 8 MG tablet  Commonly known as:  ZOFRAN  Take 1 tablet (8 mg total) by mouth every 8 (eight) hours as needed for Nausea.     pantoprazole 40 MG tablet  Commonly known as:  PROTONIX  Take 1 tablet (40 mg total) by mouth 2 (two) times daily before meals.     RENAPLEX-D 800 mcg-12.5 mg -2,000 unit Tab  Generic drug:  vit B,C-FA-zinc-selen-vit D3-E  Take 1 tablet by mouth once daily.     sevelamer carbonate 800 mg Tab  Commonly known as:  RENVELA  TAKE 2 TABLETS BY MOUTH THREE TIMES A DAY WITH MEALS     sucralfate 100 mg/mL suspension  Commonly known as:  CARAFATE  Take 5 mLs (500 mg total) by mouth 2 (two) times daily.            Significant Diagnostic Studies: Labs:   BMP:   Recent Labs   Lab 08/20/19  0434 08/21/19  0523   GLU 74 66*    144   K 3.2* 3.9    105   CO2 27 22*   BUN 27* 15   CREATININE 12.2* 8.6*   CALCIUM 8.6* 9.5   MG 2.0 2.1   , CMP   Recent Labs   Lab 08/20/19  0434 08/21/19  0523    144   K 3.2* 3.9    105   CO2 27 22*   GLU 74 66*   BUN 27* 15   CREATININE 12.2* 8.6*   CALCIUM  8.6* 9.5   PROT 7.4 8.0   ALBUMIN 3.1* 3.3*   BILITOT 0.5 0.5   ALKPHOS 230* 239*   AST 20 21   ALT 11 9*   ANIONGAP 17* 17*   ESTGFRAFRICA 4.7* 7.2*   EGFRNONAA 4.1* 6.3*   , CBC   Recent Labs   Lab 08/20/19  0434 08/20/19 2018 08/21/19  0523   WBC 8.36 7.01 5.89   HGB 9.1* 10.4* 10.1*   HCT 28.3* 32.3* 30.9*    216 215   , INR   Lab Results   Component Value Date    INR 1.1 08/21/2019    INR 1.1 08/20/2019    INR 1.0 08/19/2019   , Lipid Panel   Lab Results   Component Value Date    CHOL 111 (L) 06/22/2019    HDL 40 06/22/2019    LDLCALC 59.2 (L) 06/22/2019    TRIG 59 06/22/2019    CHOLHDL 36.0 06/22/2019   , Troponin No results for input(s): TROPONINI in the last 168 hours., A1C:   Recent Labs   Lab 06/22/19  0017   HGBA1C 4.7  4.7    and All labs within the past 24 hours have been reviewed  Microbiology:   Blood Culture   Lab Results   Component Value Date    LABBLOO No growth after 5 days. 04/03/2018   , Sputum Culture   Lab Results   Component Value Date    GSRESP >10 epithelial cells per low power field 08/07/2015    GSRESP Rare WBC's 08/07/2015    GSRESP Moderate Gram positive cocci 08/07/2015    GSRESP Moderate Gram positive rods 08/07/2015    GSRESP Few Gram negative rods 08/07/2015    GSRESP Rare yeast 08/07/2015    RESPIRATORYC Normal respiratory joy 08/07/2015    and Urine Culture    Lab Results   Component Value Date    LABURIN No growth 08/06/2015     Radiology:   Imaging Results          X-Ray Chest AP Portable (Final result)  Result time 08/19/19 08:37:23    Final result by Jatin Duncan MD (08/19/19 08:37:23)             Impression:      1. No acute radiographic findings in the chest on this single view.      Electronically signed by: Jatin Duncan MD  Date:    08/19/2019  Time:    08:37           Narrative:    EXAMINATION:  XR CHEST AP PORTABLE    CLINICAL HISTORY:  gi bleed;    TECHNIQUE:  Single frontal view of the chest was performed.    COMPARISON:  Radiograph  06/21/2019.    FINDINGS:  Cardiac monitoring leads project over the bilateral hemithoraces.  Mediastinal structures are midline.  Hilar contours are unremarkable.  Cardiac silhouette is magnified by AP technique.  Lung volumes are symmetric.  No consolidation.  No pneumothorax or pleural effusions.  No free air beneath the diaphragm.  Hypertrophic changes of the left AC joint noted.  No acute osseous abnormalities.  Vascular stent projects over the right subclavian region.                            Cardiac Graphics: EKG_ Normal sinus rhythm  Nonspecific T wave abnormality    Pending Diagnostic Studies:     None        Indwelling Lines/Drains at time of discharge:   Lines/Drains/Airways     Drain                 Hemodialysis AV Fistula Right upper arm -- days         Hemodialysis AV Fistula Right upper arm -- days                     Gavino Cordoba MD  Department of Hospital Medicine  [facility]

## 2019-08-21 NOTE — PT/OT/SLP EVAL
"Physical Therapy Evaluation and Discharge Note    Patient Name:  Vaughn Retana   MRN:  [id number]    Recommendations:     Discharge Recommendations:  home with home health   Discharge Equipment Recommendations: none   Barriers to discharge: None    Assessment:     Vaughn Retana is a 54 y.o. male admitted with a medical diagnosis of Hematemesis. .  At this time, patient is functioning at their prior level of function and does not require further acute PT services.     Recent Surgery: * No surgery found *      Plan:     During this hospitalization, patient does not require further acute PT services.  Please re-consult if situation changes.      Subjective     Chief Complaint: none  Patient/Family Comments/goals: to return home; "I can walk with a walker."  Pain/Comfort:  · Pain Rating 1: 0/10    Patients cultural, spiritual, Druze conflicts given the current situation: no    Living Environment:  Pt reports living alone in [address]  PTA enjoys fishing and reports no falls  Prior to admission, patients level of function was independent - reports using WC/RW for mobility.  Equipment used at home: wheelchair, rollator, prosthesis.  DME owned (not currently used): none.  Upon discharge, patient will have assistance from family.    Objective:     Communicated with RN prior to session.  Patient found supine with peripheral IV, telemetry upon PT entry to room.    General Precautions: Standard, fall   Orthopedic Precautions:N/A   Braces: (prosthesis)     Exams:  · Cognitive Exam:  Patient is oriented to Person and Place  · RLE ROM: WFL  · RLE Strength: WFL  · LLE ROM: residual limb WFL  · LLE Strength: WFL    Functional Mobility:  · Bed Mobility:     · Rolling Left:  independence  · Scooting: independence  · Supine to Sit: independence  · Sit to Supine: independence  · Transfers:     · Sit to Stand:  supervision with no AD  · Gait: 20ft c RW SBA  · Balance: sitting (S); standing (SBA)    AM-PAC 6 CLICK " MOBILITY  Total Score:20       Therapeutic Activities and Exercises:   Pt educated on: PT role/POC; safety c mobility; benefits of OOB activities; performing therex; d/c recs - v/u  -able to don prosthesis independently    AM-PAC 6 CLICK MOBILITY  Total Score:20     Patient left HOB elevated with all lines intact, call button in reach and RN notified.    GOALS:   Multidisciplinary Problems     Physical Therapy Goals     Not on file          Multidisciplinary Problems (Resolved)        Problem: Physical Therapy Goal    Goal Priority Disciplines Outcome Goal Variances Interventions   Physical Therapy Goal   (Resolved)     PT, PT/OT Outcome(s) achieved                     History:     Past Medical History:   Diagnosis Date    Amputation stump pain 9/10/2013    Aspiration pneumonia 7/27/2015    Asterixis 11/8/2016    C. difficile colitis 8/7/2015    Cholelithiasis without obstruction 8/25/2015    Chronic diastolic heart failure     2-23-17   1 - Low normal to mildly depressed left ventricular systolic function (EF 50-55%).    2 - Right ventricular enlargement with mildly depressed systolic function.    3 - Left ventricular diastolic dysfunction.    4 - Right atrial enlargement.    5 - Severe tricuspid regurgitation.    6 - Pulmonary hypertension. The estimated PA systolic pressure is 86 mmHg.    7 - Increased central venous pressure.    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     Metabolic acidosis, IAG, reduced excretion of inorganic acids     Myoclonic jerking 9/20/2016    Noncompliance with medication regimen 12/4/2018    Secondary hyperparathyroidism (of renal origin)     Secondary pulmonary hypertension 3/23/2017    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    TB lung, latent 08/25/2015    Negative Quantiferon Gold 3-23-17       Past Surgical History:   Procedure Laterality Date    AMPUTATION, BELOW KNEE Left 12/18/2013    Performed by Elgin Houston MD at [facility] OR 1ST FLR    COLONOSCOPY      COLONOSCOPY N/A 4/4/2017    Performed by Walker Stern MD at [facility] ENDO (2ND FLR)    EGD (ESOPHAGOGASTRODUODENOSCOPY) N/A 3/7/2019    Performed by Man Galicia MD at [facility] ENDO (2ND FLR)    EGD (ESOPHAGOGASTRODUODENOSCOPY) N/A 6/12/2018    Performed by Man Galicia MD at [facility] ENDO (2ND FLR)    ESOPHAGOGASTRODUODENOSCOPY (EGD) N/A 5/22/2018    Performed by Ke Sparks MD at [facility] ENDO (2ND FLR)    ESOPHAGOGASTRODUODENOSCOPY (EGD) N/A 3/16/2018    Performed by Kevin De La Paz MD at [facility] ENDO (2ND FLR)    ESOPHAGOGASTRODUODENOSCOPY (EGD) N/A 3/8/2018    Performed by Man Galicia MD at [facility] ENDO (2ND FLR)    ESOPHAGOGASTRODUODENOSCOPY (EGD) N/A 4/4/2017    Performed by Walker Stern MD at [facility] ENDO (2ND FLR)    ESOPHAGOGASTRODUODENOSCOPY (EGD) N/A 10/17/2014    Per[facility] at [facility] (2ND F[facility])    FISTULOGRAM Right 9/18/2014    Performed by Grayson Hubbard MD at [facility] CATH LAB    FOOT AMPUTATION THROUGH METATARSAL      left foot    LEG AMPUTATION THROUGH KNEE  12/18/2013    left BKA    R AVF  9/12/12    UPPER GASTROINTESTINAL ENDOSCOPY         Time Tracking:     PT Received On: 08/21/19  PT Start Time: 1032     PT Stop Time: 1042  PT Total Time (min): 10 min     Billable Minutes: Evaluation 10 min      Yimi Mark, PT  08/21/2019

## 2019-08-21 NOTE — PROGRESS NOTES
Progress Note  Hospital Medicine    Admit Date: 8/19/2019  Length of Stay:  LOS: 2 days     SUBJECTIVE:         Follow-up For:  Hematemesis    HPI/Interval history (See H&P for complete P,F,SHx) :     Over view    Patient is a 53 y/o male with a mhx of recurrent hematemesis, esophagitis, ESRD, DM and CHF who presented to the hospital with complaints of hematemesis for one day. He reports that last night he started throwing up blood and had multiple episodes today prior to arrival, (600cc per EMS). Upon admission he did have one episode of coffee ground emesis. He denies any precipitating event or trauma. He also denies any headaches, feeling lightheaded, blurry vision, abdominal or epigastric pain and melena. He was given octreotide and had relief with zofran. He has now regained his appetite and feels much better. Workup was significant for HgB 9.1 (BL 11-13), CXR did not show any acute intrathoracic findings. Metabolic panel significant for creatinine of 11.9 but he is due for HD today (typically MWF).     Of note he was discharged from [facility] for hematemesis on 8/12/2019. EGD during that admission showed grade D esophagitis without active bleeding. He is currently hemodynamically stable and has no current complaints. Pt will be admitted to the critical care unit for significant hematemesis + airway watch.              Hospital/ICU Course:  Admitted to the critical care unit for airway watch due to hematemesis. He was given zofran and a one time dose of protonix 80mg. Currently being treated with protonix 40mg bid.      08/20/2019    Following clear liquids, patient had another episode of coffee ground hematemesis. No feelings of headache, dizziness, abdominal pain or chest pain.   HgB decreased to 6.8, 1 unit pRBCs given    Interval history  Had nausea and vomiting so overnight, started on Zofran around the clock and Reglan as needed.  Advanced to renal diet today.  Hemoglobin stable at 10.  Scheduled for  hemodialysis today.  Possible discharge to nursing home in a.m.    Review of Systems: List if applicable  Pain scale: 0/10  Constitutional: Negative for chills, diaphoresis, fatigue and fever.   HENT: Negative for sore throat.    Respiratory: Negative for chest tightness, shortness of breath and wheezing.    Cardiovascular: Negative for chest pain and palpitations.   Gastrointestinal: Positive for nausea and vomitin - improved Negative for abdominal distention, abdominal pain, anal bleeding and blood in stool.   Endocrine: Negative for cold intolerance and heat intolerance.   Genitourinary: Negative for hematuria.   Musculoskeletal: Negative for arthralgias and joint swelling.   Skin: Negative for pallor and rash.   Neurological: Negative for dizziness, weakness, light-headedness, numbness and headaches.   Psychiatric/Behavioral: Negative for hallucinations. The patient is not nervous/anxious.      OBJECTIVE:     Vital Signs Range (Last 24H):  Temp:  [97 °F (36.1 °C)-98.2 °F (36.8 °C)]   Pulse:  [73-85]   Resp:  [11-18]   BP: (100-158)/()   SpO2:  [96 %-100 %]     Physical Exam:  General- Patient alert and oriented x3   HEENT- PERRLA, EOMI, OP clear  Neck- No JVD, Lymphadenopathy, Thyromegaly  CV- Regular rate and rhythm, No Murmur/cheryl/rubs  Resp- Lungs CTA Bilaterally, No increased WOB  Abdomen- Non tender/non-distended, BS normoactive x4 quads, no HSM  Extrem- No cyanosis, clubbing, edema. Right upper extremity AV fistula thrill present.  Left below-knee amputation (ambulates with prosthesis)  Skin- No rashes, lesions, ulcers  Neuro- Strength 5/5 flexors/extensors,Intact sensation to light touch grossly      Medications:  Medication list was reviewed and changes noted under Assessment/Plan.      Current Facility-Administered Medications:     0.9%  NaCl infusion (for blood administration), , Intravenous, Q24H PRN, Sundmary Germain MD    0.9%  NaCl infusion, , Intravenous, Once, Glenis Benavidez DNP,  FNP-C, Stopped at 08/20/19 0930    0.9%  NaCl infusion, , Intravenous, Once, Wade Navarro NP    acetaminophen oral solution 650 mg, 650 mg, Oral, Q6H PRN, Phoenix Carmichael PA-C    albuterol-ipratropium 2.5 mg-0.5 mg/3 mL nebulizer solution 3 mL, 3 mL, Nebulization, Q4H PRN, Phoenix Carmichael, PA-C    dextrose 10% (D10W) Bolus, 12.5 g, Intravenous, PRN, Phoenix Carmichael, PA-C    dextrose 10% (D10W) Bolus, 25 g, Intravenous, PRN, Phoenix Carpioz, PA-C    glucagon (human recombinant) injection 1 mg, 1 mg, Intramuscular, PRN, Phoenix Carmichael, PA-C    glucose chewable tablet 16 g, 16 g, Oral, PRN, Phoenix Carmichael, PA-C    glucose chewable tablet 24 g, 24 g, Oral, PRN, Phoenix Carmichael, PA-C    metoclopramide HCl injection 10 mg, 10 mg, Intravenous, Q6H PRN, Phoenix Carmichael, PA-C, 10 mg at 08/21/19 1558    ondansetron injection 8 mg, 8 mg, Intravenous, Q6H, Phoenix Carmichael, PA-C, 8 mg at 08/21/19 1242    pantoprazole EC tablet 40 mg, 40 mg, Oral, BID AC, Sofia Marley MD    pantoprazole injection 40 mg, 40 mg, Intravenous, Once, Phoenix Carmichael, PA-C    ramelteon tablet 8 mg, 8 mg, Oral, Nightly PRN, Phoenix Carmichael, PA-C    sodium chloride 0.9% flush 10 mL, 10 mL, Intravenous, PRN, Tay Grewal MD    sodium chloride, acetaminophen, albuterol-ipratropium, Dextrose 10% Bolus, Dextrose 10% Bolus, glucagon (human recombinant), glucose, glucose, metoclopramide HCl, ramelteon, sodium chloride 0.9%    Laboratory/Diagnostic Data:  Reviewed and noted in plan where applicable- Please see chart for full lab data.    Recent Labs   Lab 08/20/19  0434 08/20/19 2018 08/21/19  0523   WBC 8.36 7.01 5.89   HGB 9.1* 10.4* 10.1*   HCT 28.3* 32.3* 30.9*    216 215       Recent Labs   Lab 08/19/19  0756 08/20/19  0434 08/20/19  1008 08/21/19  0523    144  --  144   K 3.5 3.2*  --  3.9   CL 95 100  --  105   CO2 31* 27  --  22*   BUN 23* 27*  --  15   CREATININE 11.0* 12.2*  --  8.6*    74  --  66*   CALCIUM 9.7 8.6*   "--  9.5   MG 2.1 2.0  --  2.1   PHOS 2.6*  --  1.5* 3.8   LIPASE 87*  --   --   --        Recent Labs   Lab 08/19/19  0756 08/20/19  0434 08/21/19  0523   ALKPHOS 267* 230* 239*   ALT 14 11 9*   AST 25 20 21   ALBUMIN 3.7 3.1* 3.3*   PROT 9.0* 7.4 8.0   BILITOT 0.5 0.5 0.5   INR 1.0 1.1 1.1        Microbiology labs for the last week  Microbiology Results (last 7 days)     ** No results found for the last 168 hours. **           Imaging Results          X-Ray Chest AP Portable (Final result)  Result time 08/19/19 08:37:23    Final result by Jatin Duncan MD (08/19/19 08:37:23)             Impression:      1. No acute radiographic findings in the chest on this single view.      Electronically signed by: Jatin Duncan MD  Date:    08/19/2019  Time:    08:37           Narrative:    EXAMINATION:  XR CHEST AP PORTABLE    CLINICAL HISTORY:  gi bleed;    TECHNIQUE:  Single frontal view of the chest was performed.    COMPARISON:  Radiograph 06/21/2019.    FINDINGS:  Cardiac monitoring leads project over the bilateral hemithoraces.  Mediastinal structures are midline.  Hilar contours are unremarkable.  Cardiac silhouette is magnified by AP technique.  Lung volumes are symmetric.  No consolidation.  No pneumothorax or pleural effusions.  No free air beneath the diaphragm.  Hypertrophic changes of the left AC joint noted.  No acute osseous abnormalities.  Vascular stent projects over the right subclavian region.                              Estimated body mass index is 22.01 kg/m² as calculated from the following:    Height as of this encounter: 5' 5" (1.651 m).    Weight as of this encounter: 60 kg (132 lb 4.4 oz).    I & O (Last 24H):    Intake/Output Summary (Last 24 hours) at 8/21/2019 1605  Last data filed at 8/21/2019 0913  Gross per 24 hour   Intake 510 ml   Output 1000 ml   Net -490 ml       Estimated Creatinine Clearance: 8.2 mL/min (A) (based on SCr of 8.6 mg/dL (H)).    ASSESSMENT/PLAN:     Active " Problems:      Active Hospital Problems    Diagnosis  POA    *Hematemesis [K92.0]  hx of hematemesis and gastritis who presented with multiple episodes of coffee ground hematemesis. Hemodynamically stable. Received Protonix 80mg IV. Multiple episodes prior to presentation with the ED. EGD in 3/2019 showed grade D esophagitis. Also has a history of non-bleeding gastric ulcer (not seen on most recent EGDs this year). He was admitted to [facility] twice early August for same, EGD 8/2 showed grade A esophagitis with no active bleeding Status post Gastroenterology evaluation. Hgb did drop to 6.8 received 1U PRBC, and Hgb now 9s. Unclear if 6.8 was lab error      - Protonix 40mg bid  - Trend H/H  - HgB decreased to 6.8, 1 unit pRBCs given on 08/19/2019  - Zofran prn   - Diet: clear liquids.   - Resume home medications once able to tolerate PO intake    08/20/2019  Recommend IV PPI bid (can be transitioned to PO when able to tolerate)  - Serial H/H  - Recommend scheduled zofran per GI recommendation, patient reports improvement in nausea  CLD today  - No need for octreotide as no history or concern for variceal bleed  - No need for EGD at this time as most recent one from 8/2 with only grade A esophagitis, but will continue to monitor  - Needs outpatient gastric emptying study    Yes    Anemia [D64.9] anemia of chronic disease at baseline with acute drop in hemoglobin to 6.8 with hematemesis.  Transfused as above monitor  Unknown    Hypophosphatemia [E83.39] 1.5.  Nephrology following. phosphate binders for now  Unknown    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, without long-term current use of insulin [E11.29]Currently controlled with diet with his last HgbA1c being 4.1.  Currently on a clear liquid diet.      - Advance diet as tolerated to a diabetic renal diet.  Yes     Chronic    Renovascular hypertension [I15.0] blood pressure controlled off medication  Yes     Chronic    Chronic kidney  disease-mineral and bone disorder [N18.9, E83.9, M89.9]  Yes     Chronic    Peripheral vascular disease in diabetes mellitus [E11.51]  Yes     Chronic    ESRD on hemodialysis [N18.6, Z99.2] continue with dialysis per Nephrology status post hemodialysis on 08/20/2019  Not Applicable     Chronic     MWF at [facility]      Secondary hyperparathyroidism of renal origin [N25.81]  Yes     Chronic      Resolved Hospital Problems   No resolved problems to display.         Disposition- nursing home in a.m.    DVT prophylaxis addressed with:  Kath Cordoba MD  Attending Staff Physician  [facility]  pager- [phone] [id number] - [phone]

## 2019-08-21 NOTE — PLAN OF CARE
08/21/19 1227   Post-Acute Status   Post-Acute Authorization Placement   Post-Acute Placement Status Referrals Sent     Patient will possibly discharge to [facility] today. SW will continue to follow up.    Joselyn Rincon LMSW  [facility]   [phone]

## 2019-08-21 NOTE — PROGRESS NOTES
PRATIK Hill, notified of patient experiencing brown emesis. Vitals stable, no distress noted, suction at bedside. Put in orders for STAT cbc, zofran and protonix. Will continue to monitor.

## 2019-08-21 NOTE — PLAN OF CARE
[facility]     Department of Hospital Medicine     [address] [place] [address]     [phone] after hours  [phone] fax       NURSING HOME ORDERS    Patient Name: Vaughn Retana  YOB: [birth year]    Admit to Nursing Home:  Regular Bed      Diagnoses:  Active Hospital Problems    Diagnosis  POA    Anemia [D64.9]  Yes    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, without long-term current use of insulin [E11.29]  Yes     Chronic    Renovascular hypertension [I15.0]  Yes     Chronic    Chronic kidney disease-mineral and bone disorder [N18.9, E83.9, M89.9]  Yes     Chronic    Peripheral vascular disease in diabetes mellitus [E11.51]  Yes     Chronic    ESRD on hemodialysis [N18.6, Z99.2]  Not Applicable     Chronic     MWF at [facility]      Secondary hyperparathyroidism of renal origin [N25.81]  Yes     Chronic      Resolved Hospital Problems    Diagnosis Date Resolved POA    *Hematemesis [K92.0] 08/21/2019 Yes    Acute blood loss anemia [D62] 08/21/2019 Yes    Hypophosphatemia [E83.39] 08/21/2019 Yes       Patient is homebound due to:  Hematemesis    Allergies:  Review of patient's allergies indicates:   Allergen Reactions    Fosrenol [lanthanum] Nausea And Vomiting     Nausea and vomiting       Vitals:  Per nursing home protocol    Diet: cardiac diet, diabetic diet: 2000 calorie and renal diet                Acitivities:    - Up in a chair each morning as tolerated  - Ambulate with assistance to bathroom  - Scheduled walks once each shift (every 8 hours)  - May ambulate independently  - May use walker, cane, or self-propelled wheelchair       - Weight bearing:  As tolerated    LABS:  Per facility protocol    Nursing Precautions:     - Aspiration precautions:             - Total assistance with meals            -  Upright 90 degrees befor during and after meals             -  Suction at bedside           - Fall precautions per nursing home protocol   - Decubitus precautions:        -  for positioning   - Pressure reducing foam mattress   - Turn patient every two hours. Use wedge pillows to anchor patient    CONSULTS:      Physical Therapy to evaluate and treat     Occupational Therapy to evaluate and treat        MISCELLANEOUS CARE:   Routine Skin for Bedridden Patients:  Apply moisture barrier cream to all    skin folds and wet areas in perineal area daily and after baths and                           all bowel movements.               DIABETES CARE:        Check blood sugar:      Fingerstick blood sugar AC and HS         Report CBG < 60 or > 400 to physician.                                          Insulin Sliding Scale          Glucose  Novolog Insulin Subcutaneous        0 - 60   [place] juice or glucose tablet, hold insulin      No insulin   201-250  2 units   251-300  4 units   301-350  6 units   351-400  8 units   >400   10 units then call physician      Medications: Discontinue all previous medication orders, if any. See new list below.     Vaughn Retana   Home Medication Instructions ANABEL:[id number]    Printed on:08/23/19 7330   Medication Information                      atorvastatin (LIPITOR) 80 MG tablet  Take 1 tablet (80 mg total) by mouth every evening.             carvedilol (COREG) 25 MG tablet  Take 1 tablet (25 mg total) by mouth 2 (two) times daily with meals.             dicyclomine (BENTYL) 10 MG capsule  Take 1 capsule (10 mg total) by mouth before meals as needed (TID PRN). For belching             metoclopramide HCl (REGLAN) 10 MG tablet  Take 0.5 tablets (5 mg total) by mouth 3 (three) times daily before meals. for 5 days             ondansetron (ZOFRAN) 8 MG tablet  Take 1 tablet (8 mg total) by mouth every 8 (eight) hours as needed for Nausea.             pantoprazole (PROTONIX) 40 MG tablet  Take 1 tablet (40 mg total) by mouth 2 (two) times daily before meals.              RENAPLEX-D 800 mcg-12.5 mg -2,000 unit Tab  Take 1 tablet by mouth once daily.             sevelamer carbonate (RENVELA) 800 mg Tab  TAKE 2 TABLETS BY MOUTH THREE TIMES A DAY WITH MEALS             sucralfate (CARAFATE) 100 mg/mL suspension  Take 5 mLs (500 mg total) by mouth 2 (two) times daily.                   _________________________________  Gavino Cordoba MD  08/23/2019

## 2019-08-22 NOTE — PLAN OF CARE
08/22/19 1530   Post-Acute Status   Post-Acute Authorization Placement;Other   Other Status See Comments     Patient not medically ready expected to discharge to [facility] when medically ready.    Joselyn Rincon LMSW  [facility]   [phone]

## 2019-08-22 NOTE — PROGRESS NOTES
Dr. Cordoba notified of pt. with light brown emesis at this time.  Breakfast noticed in emesis.  Will continue to monitor pt.

## 2019-08-22 NOTE — PROGRESS NOTES
Progress Note  Hospital Medicine    Admit Date: 8/19/2019  Length of Stay:  LOS: 3 days     SUBJECTIVE:         Follow-up For:  Hematemesis    HPI/Interval history (See H&P for complete P,F,SHx) :     Over view    Patient is a 55 y/o male with a mhx of recurrent hematemesis, esophagitis, ESRD, DM and CHF who presented to the hospital with complaints of hematemesis for one day. He reports that last night he started throwing up blood and had multiple episodes today prior to arrival, (600cc per EMS). Upon admission he did have one episode of coffee ground emesis. He denies any precipitating event or trauma. He also denies any headaches, feeling lightheaded, blurry vision, abdominal or epigastric pain and melena. He was given octreotide and had relief with zofran. He has now regained his appetite and feels much better. Workup was significant for HgB 9.1 (BL 11-13), CXR did not show any acute intrathoracic findings. Metabolic panel significant for creatinine of 11.9 but he is due for HD today (typically MWF).     Of note he was discharged from [facility] for hematemesis on 8/12/2019. EGD during that admission showed grade D esophagitis without active bleeding. He is currently hemodynamically stable and has no current complaints. Pt will be admitted to the critical care unit for significant hematemesis + airway watch.              Hospital/ICU Course:  Admitted to the critical care unit for airway watch due to hematemesis. He was given zofran and a one time dose of protonix 80mg. Currently being treated with protonix 40mg bid.      08/20/2019    Following clear liquids, patient had another episode of coffee ground hematemesis. No feelings of headache, dizziness, abdominal pain or chest pain.   HgB decreased to 6.8, 1 unit pRBCs given    08/21/2019  Had nausea and vomiting so overnight, started on Zofran around the clock and Reglan as needed.  Advanced to renal diet today.  Hemoglobin stable at 10.  Scheduled for hemodialysis  today.      Interval history  Complains of nausea and vomiting with renal diet.  Discussed with Gastroenterology.  Started on full liquids, Reglan 5 mg IV t.i.d. with meals and Phenergan Q 6 hourly for 1 day with Zofran    Review of Systems: List if applicable  Pain scale: 0/10  Constitutional: Negative for chills, diaphoresis, fatigue and fever.   HENT: Negative for sore throat.    Respiratory: Negative for chest tightness, shortness of breath and wheezing.    Cardiovascular: Negative for chest pain and palpitations.   Gastrointestinal: Positive for nausea and vomiting Negative for abdominal distention, abdominal pain, anal bleeding and blood in stool.   Endocrine: Negative for cold intolerance and heat intolerance.   Genitourinary: Negative for hematuria.   Musculoskeletal: Negative for arthralgias and joint swelling.   Skin: Negative for pallor and rash.   Neurological: Negative for dizziness, weakness, light-headedness, numbness and headaches.   Psychiatric/Behavioral: Negative for hallucinations. The patient is not nervous/anxious.      OBJECTIVE:     Vital Signs Range (Last 24H):  Temp:  [97 °F (36.1 °C)-98 °F (36.7 °C)]   Pulse:  []   Resp:  [16-18]   BP: (101-161)/()   SpO2:  [100 %]     Physical Exam:  General- Patient alert and oriented x3   HEENT- PERRLA, EOMI, OP clear  Neck- No JVD, Lymphadenopathy, Thyromegaly  CV- Regular rate and rhythm, No Murmur/cheryl/rubs  Resp- Lungs CTA Bilaterally, No increased WOB  Abdomen- Non tender/non-distended, BS normoactive x4 quads, no HSM  Extrem- No cyanosis, clubbing, edema. Right upper extremity AV fistula thrill present.  Left below-knee amputation (ambulates with prosthesis)  Skin- No rashes, lesions, ulcers  Neuro- Strength 5/5 flexors/extensors,Intact sensation to light touch grossly      Medications:  Medication list was reviewed and changes noted under Assessment/Plan.      Current Facility-Administered Medications:     0.9%  NaCl infusion (for  blood administration), , Intravenous, Q24H PRN, Lito Germain MD    0.9%  NaCl infusion, , Intravenous, Once, Glenis Benavidez DNP, FNP-C, Stopped at 08/20/19 0930    acetaminophen oral solution 650 mg, 650 mg, Oral, Q6H PRN, Phoenix Carmichael PA-C    albuterol-ipratropium 2.5 mg-0.5 mg/3 mL nebulizer solution 3 mL, 3 mL, Nebulization, Q4H PRN, PRATIK Hill-FED[place]    dextrose 10% (D10W) Bolus, 12.5 g, Intravenous, PRN, Phoenix Carmichael, PA-C    dextrose 10% (D10W) Bolus, 25 g, Intravenous, PRN, PRATIK Hill-F[place]    glucagon (human recombinant) injection 1 mg, 1 mg, Intramuscular, PRN, PRATIK Hill-FED[place]    glucose chewable tablet 16 g, 16 g, Oral, PRN, PRATIK Hill-C    glucose chewable tablet 24 g, 24 g, Oral, PRN, PRATIK Hill-C    metoclopramide HCl injection 5 mg, 5 mg, Intravenous, TID WM, Gavino Cordoba MD    ondansetron injection 8 mg, 8 mg, Intravenous, Q6H, Phoenix Carmichael PA-C, 8 mg at 08/22/19 0554    pantoprazole EC tablet 40 mg, 40 mg, Oral, BID AC, Sofia Marley MD    pantoprazole injection 40 mg, 40 mg, Intravenous, Once, Phoenix Carmichael PA-C    promethazine (PHENERGAN) 6.25 mg in dextrose 5 % 50 mL IVPB, 6.25 mg, Intravenous, Q6H, Gavino Cordoba MD, Last Rate: 150 mL/hr at 08/22/19 1055, 6.25 mg at 08/22/19 1055    ramelteon tablet 8 mg, 8 mg, Oral, Nightly PRN, Phoenix Carmichael PA-C    sodium chloride 0.9% flush 10 mL, 10 mL, Intravenous, PRN, Tay Grewal MD    sodium chloride, acetaminophen, albuterol-ipratropium, Dextrose 10% Bolus, Dextrose 10% Bolus, glucagon (human recombinant), glucose, glucose, ramelteon, sodium chloride 0.9%    Laboratory/Diagnostic Data:  Reviewed and noted in plan where applicable- Please see chart for full lab data.    Recent Labs   Lab 08/20/19 2018 08/21/19  0523 08/22/19  0341   WBC 7.01 5.89 7.04   HGB 10.4* 10.1* 10.8*   HCT 32.3* 30.9* 33.5*    215 169       Recent Labs   Lab 08/19/19  0756 08/20/19  0434  "08/20/19  1008 08/21/19  0523 08/22/19  0341    144  --  144 143   K 3.5 3.2*  --  3.9 3.7   CL 95 100  --  105 105   CO2 31* 27  --  22* 25   BUN 23* 27*  --  15 8   CREATININE 11.0* 12.2*  --  8.6* 5.9*    74  --  66* 80   CALCIUM 9.7 8.6*  --  9.5 9.7   MG 2.1 2.0  --  2.1 2.2   PHOS 2.6*  --  1.5* 3.8 2.8   LIPASE 87*  --   --   --   --        Recent Labs   Lab 08/20/19  0434 08/21/19  0523 08/22/19  0341   ALKPHOS 230* 239* 242*   ALT 11 9* 12   AST 20 21 24   ALBUMIN 3.1* 3.3* 3.5   PROT 7.4 8.0 8.5*   BILITOT 0.5 0.5 0.6   INR 1.1 1.1 1.1        Microbiology labs for the last week  Microbiology Results (last 7 days)     ** No results found for the last 168 hours. **           Imaging Results          X-Ray Chest AP Portable (Final result)  Result time 08/19/19 08:37:23    Final result by Jatin Duncan MD (08/19/19 08:37:23)             Impression:      1. No acute radiographic findings in the chest on this single view.      Electronically signed by: Jatin Duncan MD  Date:    08/19/2019  Time:    08:37           Narrative:    EXAMINATION:  XR CHEST AP PORTABLE    CLINICAL HISTORY:  gi bleed;    TECHNIQUE:  Single frontal view of the chest was performed.    COMPARISON:  Radiograph 06/21/2019.    FINDINGS:  Cardiac monitoring leads project over the bilateral hemithoraces.  Mediastinal structures are midline.  Hilar contours are unremarkable.  Cardiac silhouette is magnified by AP technique.  Lung volumes are symmetric.  No consolidation.  No pneumothorax or pleural effusions.  No free air beneath the diaphragm.  Hypertrophic changes of the left AC joint noted.  No acute osseous abnormalities.  Vascular stent projects over the right subclavian region.                              Estimated body mass index is 22.01 kg/m² as calculated from the following:    Height as of this encounter: 5' 5" (1.651 m).    Weight as of this encounter: 60 kg (132 lb 4.4 oz).    I & O (Last 24H):    Intake/Output " Summary (Last 24 hours) at 8/22/2019 1108  Last data filed at 8/22/2019 0900  Gross per 24 hour   Intake 1000 ml   Output 1200 ml   Net -200 ml       Estimated Creatinine Clearance: 12 mL/min (A) (based on SCr of 5.9 mg/dL (H)).    ASSESSMENT/PLAN:     Active Problems:      Active Hospital Problems    Diagnosis  POA    *Hematemesis [K92.0]  hx of hematemesis and gastritis who presented with multiple episodes of coffee ground hematemesis. Hemodynamically stable. Received Protonix 80mg IV. Multiple episodes prior to presentation with the ED. EGD in 3/2019 showed grade D esophagitis. Also has a history of non-bleeding gastric ulcer (not seen on most recent EGDs this year). He was admitted to [facility] twice early August for same, EGD 8/2 showed grade A esophagitis with no active bleeding Status post Gastroenterology evaluation. Hgb did drop to 6.8 received 1U PRBC, and Hgb now 9s. Unclear if 6.8 was lab error      - Protonix 40mg bid  - Trend H/H  - HgB decreased to 6.8, 1 unit pRBCs given on 08/19/2019  - Zofran prn   - Diet: clear liquids.   - Resume home medications once able to tolerate PO intake    08/20/2019  Recommend IV PPI bid (can be transitioned to PO when able to tolerate)  - Serial H/H  - Recommend scheduled zofran per GI recommendation, patient reports improvement in nausea  CLD today  - No need for octreotide as no history or concern for variceal bleed  - No need for EGD at this time as most recent one from 8/2 with only grade A esophagitis, but will continue to monitor  - Needs outpatient gastric emptying study    8/22/19  Complains of nausea and vomiting with renal diet.  Discussed with Gastroenterology.  Started on full liquids, Reglan 5 mg IV t.i.d. with meals and Phenergan Q 6 hourly for 1 day with Zofran    Yes    Anemia [D64.9] anemia of chronic disease at baseline with acute drop in hemoglobin to 6.8 with hematemesis.  Transfused as above monitor.  Hemoglobin at 10.8  Unknown    Hypophosphatemia  [E83.39] resolved.  Nephrology following. phosphate binders for now  Unknown    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, without long-term current use of insulin [E11.29]Currently controlled with diet with his last HgbA1c being 4.1.  Currently on a clear liquid diet.      - Advance diet as tolerated to a diabetic renal diet.  Yes     Chronic    Renovascular hypertension [I15.0] blood pressure controlled off medication  Yes     Chronic    Chronic kidney disease-mineral and bone disorder [N18.9, E83.9, M89.9]  Yes     Chronic    Peripheral vascular disease in diabetes mellitus [E11.51]  Yes     Chronic    ESRD on hemodialysis [N18.6, Z99.2] continue with dialysis per Nephrology status post hemodialysis on 08/21/2019  Not Applicable     Chronic     MWF at [facility]      Secondary hyperparathyroidism of renal origin [N25.81]  Yes     Chronic      Resolved Hospital Problems   No resolved problems to display.         Disposition- nursing home in a.m.    DVT prophylaxis addressed with:  Kath Cordoba MD  Attending Staff Physician  [facility]  pager- [phone]  Spectralvhx - [phone]

## 2019-08-22 NOTE — PLAN OF CARE
Problem: Adult Inpatient Plan of Care  Goal: Plan of Care Review  Outcome: Ongoing (interventions implemented as appropriate)  Pt. with nonskid footwear on with bed in lowest position and locked with bed rails up x2.  Pt. instructed to call prior to getting OOB.  Pt. with call light within reach and verbalized understanding.  Pt. with c/o nausea and emesis this morning after breakfast.  Pt. Started on a new anti-emetic regimen and is feeling better this afternoon.  Will continue to monitor pt.

## 2019-08-22 NOTE — PROGRESS NOTES
3 hr HD complete no net UF dario well pt did continue with nausea as had been nauseated throughout the day 15 gauge needles removed pressure held x 5 minutes pressure dressing applied hemostasis achieved +bruit/thrill transported from HD unit to room 1158B via stretcher

## 2019-08-23 NOTE — PROGRESS NOTES
Report received from ISMAEL Robins. Pt arrived from floor AAOx4. Maintenance dialysis initiated via RICHARD fistula without difficulty. Dialysis days M,W,F.

## 2019-08-23 NOTE — PROGRESS NOTES
3.5hr HD treatment stopped 22 minutes early due to system clotting. 0.4L of fluid removed due to low bp pt asymptomatic. Both needles of a RICHARD fistula removed and pressure held until hemostasis achieved dressing applied. Report given to ISMAEL Robins.

## 2019-08-23 NOTE — PROGRESS NOTES
This nurse called report to accepting facility; facility RN familiar with PT, updated on progress during hospital stay. Awaiting transport.

## 2019-08-23 NOTE — PLAN OF CARE
08/23/19 1622   Post-Acute Status   Post-Acute Authorization Placement   Post-Acute Placement Status Set-up Complete     Patient is discharging today to [facility].  set up transportation via [facility] and patient will be transported by 5:30 by wheelchair. Nurse call report at 5:00 to [phone], patient admitted to room 514.    Joselyn Rincon LMSW  [facility]   [id number]

## 2019-08-25 PROBLEM — K92.0 COFFEE GROUND EMESIS: Status: ACTIVE | Noted: 2019-01-01

## 2019-08-25 PROBLEM — K92.2 GI BLEED: Status: ACTIVE | Noted: 2019-01-01

## 2019-08-25 PROBLEM — D69.6 THROMBOCYTOPENIA: Status: RESOLVED | Noted: 2019-06-22 | Resolved: 2019-01-01

## 2019-08-25 PROBLEM — I16.0 HYPERTENSIVE URGENCY: Status: RESOLVED | Noted: 2018-12-04 | Resolved: 2019-01-01

## 2019-08-25 PROBLEM — R11.2 INTRACTABLE VOMITING WITH NAUSEA: Status: RESOLVED | Noted: 2019-03-06 | Resolved: 2019-01-01

## 2019-08-25 PROBLEM — R53.1 WEAKNESS: Status: RESOLVED | Noted: 2019-03-06 | Resolved: 2019-01-01

## 2019-08-25 PROBLEM — D50.0 CHRONIC BLOOD LOSS ANEMIA: Status: ACTIVE | Noted: 2019-01-01

## 2019-08-25 PROBLEM — E87.6 HYPOKALEMIA: Status: RESOLVED | Noted: 2018-03-08 | Resolved: 2019-01-01

## 2019-08-25 NOTE — ED PROVIDER NOTES
Encounter Date: 8/25/2019       History     Chief Complaint   Patient presents with    Fatigue     Discharged from hospital 8/23. Returned for evaluation of fatigue. Last dialyzed Friday.      Had n/v persistently since d/c from hospital on 8/23.. Had been to dialysis on schedule M/W/F.  Had coffee ground colored emesis. Got an endoscopy on 8/2. Did get a blood transfusion while in hospital from 8/19 to 8/23. Had no chest pains, no sob. Still thirsty and has been tolerating some po water. Had no diarrhea or blood in stool. Feels dizzy and fatigued. No leg swelling or other bruising. Denies taking blood thinners.         Review of patient's allergies indicates:   Allergen Reactions    Fosrenol [lanthanum] Nausea And Vomiting     Nausea and vomiting     Past Medical History:   Diagnosis Date    Amputation stump pain 9/10/2013    Aspiration pneumonia 7/27/2015    Asterixis 11/8/2016    C. difficile colitis 8/7/2015    Cholelithiasis without obstruction 8/25/2015    Chronic diastolic heart failure     2-23-17   1 - Low normal to mildly depressed left ventricular systolic function (EF 50-55%).    2 - Right ventricular enlargement with mildly depressed systolic function.    3 - Left ventricular diastolic dysfunction.    4 - Right atrial enlargement.    5 - Severe tricuspid regurgitation.    6 - Pulmonary hypertension. The estimated PA systolic pressure is 86 mmHg.    7 - Increased central venous pressure.    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    Metabolic acidosis, IAG, reduced excretion of inorganic acids     Myoclonic jerking 9/20/2016    Noncompliance with medication regimen 12/4/2018    Secondary hyperparathyroidism (of renal origin)     Secondary pulmonary hypertension 3/23/2017    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    TB lung, latent 08/25/2015    Negative Quantiferon Gold 3-23-17     Past Surgical History:   Procedure Laterality Date    AMPUTATION, BELOW KNEE Left 12/18/2013    Performed by Elgin Houston MD at [facility] OR 1ST FLR    COLONOSCOPY      COLONOSCOPY N/A 4/4/2017    Performed by Walker Stern MD at [facility] ENDO (2ND FLR)    EGD (ESOPHAGOGASTRODUODENOSCOPY) N/A 3/7/2019    Performed by Man Galicia MD at [facility] ENDO (2ND FLR)    EGD (ESOPHAGOGASTRODUODENOSCOPY) N/A 6/12/2018    Performed by Man Galicia MD at [facility] ENDO (2ND FLR)    ESOPHAGOGASTRODUODENOSCOPY (EGD) N/A 5/22/2018    Performed by Ke Sparks MD at [facility] ENDO (2ND FLR)    ESOPHAGOGASTRODUODENOSCOPY (EGD) N/A 3/16/2018    Performed by Kevin De La Paz MD at [facility] ENDO (2ND FLR)    ESOPHAGOGASTRODUODENOSCOPY (EGD) N/A 3/8/2018    Performed by Man Galicia MD at [facility] ENDO (2ND FLR)    ESOPHAGOGASTRODUODENOSCOPY (EGD) N/A 4/4/2017    Performed by Walker Stern MD at [facility] ENDO (2ND FLR)    ESOPHAGOGASTRODUODENOSCOPY (EGD) N/A 10/17/2014    Performed by Man Galicia MD at [facility] ENDO (2ND FLR)    FISTULOGRAM Right 9/18/2014    Performed by Grayson Hubbard MD at [facility] CATH LAB    FOOT AMPUTATION THROUGH METATARSAL      left foot    LEG AMPUTATION THROUGH KNEE  12/18/2013    left BKA    R AVF  9/12/12    UPPER GASTROINTESTINAL ENDOSCOPY       Family History   Problem Relation Age of Onset    Early death Mother     Kidney disease Father     Hypertension Father     Heart disease Father     Hyperlipidemia Father     Diabetes Brother     Alcohol abuse Maternal  Grandmother     Diabetes Maternal Grandfather     Hypertension Sister     Melanoma Neg Hx      Social History     Tobacco Use    Smoking status: Former Smoker     Packs/day: 1.00     Years: 10.00     Pack years: 10.00    Smokeless tobacco: Never Used   Substance Use Topics    Alcohol use: No    Drug use: No     Review of Systems   Constitutional: Positive for chills and fatigue. Negative for fever.   HENT: Negative.    Eyes: Negative.    Respiratory: Positive for shortness of breath.    Cardiovascular: Negative.    Gastrointestinal: Positive for nausea and vomiting. Negative for blood in stool and diarrhea.   Genitourinary:        ESRD   Musculoskeletal: Negative.    Skin: Negative.    Neurological: Negative.    All other systems reviewed and are negative.      Physical Exam     Initial Vitals [08/25/19 1546]   BP Pulse Resp Temp SpO2   129/81 110 18 97.7 °F (36.5 °C) 100 %      MAP       --         Physical Exam    Nursing note and vitals reviewed.  Constitutional: He appears well-developed and well-nourished.   HENT:   Head: Normocephalic and atraumatic.   Mouth/Throat: Oropharynx is clear and moist.   conjucntival pallor   Eyes: EOM are normal. Pupils are equal, round, and reactive to light.   Pale conjunctivae   Neck: Normal range of motion. Neck supple. No JVD present.   Cardiovascular: Normal rate, regular rhythm, normal heart sounds and intact distal pulses. Exam reveals no friction rub.    No murmur heard.  Pulmonary/Chest: Breath sounds normal. No respiratory distress. He has no wheezes.   Abdominal: Soft. He exhibits distension. There is no tenderness. There is no guarding.   Musculoskeletal: Normal range of motion.   Right ue with dialysis access site. Left BKA   Neurological: He is alert and oriented to person, place, and time.   Skin: Skin is warm. Capillary refill takes less than 2 seconds. No erythema. No pallor.         ED Course   Procedures  Labs Reviewed   CBC W/ AUTO DIFFERENTIAL -  Abnormal; Notable for the following components:       Result Value    RBC 3.94 (*)     Hemoglobin 12.7 (*)     Mean Corpuscular Volume 102 (*)     Mean Corpuscular Hemoglobin 32.2 (*)     Mean Corpuscular Hemoglobin Conc 31.6 (*)     RDW 15.4 (*)     Gran # (ANC) 8.1 (*)     Gran% 74.6 (*)     Lymph% 16.9 (*)     All other components within normal limits    Narrative:     add on MG #[id number] & PHOS #[id number] per Carlos Baker MD @   19:08  08/25/2019    COMPREHENSIVE METABOLIC PANEL - Abnormal; Notable for the following components:    CO2 20 (*)     Glucose 123 (*)     BUN, Bld 30 (*)     Creatinine 11.3 (*)     Total Protein 8.8 (*)     Alkaline Phosphatase 242 (*)     Anion Gap 19 (*)     eGFR if  5.2 (*)     eGFR if non  4.5 (*)     All other components within normal limits   LACTIC ACID, PLASMA - Abnormal; Notable for the following components:    Lactate (Lactic Acid) 2.6 (*)     All other components within normal limits   TROPONIN I - Abnormal; Notable for the following components:    Troponin I 0.229 (*)     All other components within normal limits   AMYLASE - Abnormal; Notable for the following components:    Amylase 294 (*)     All other components within normal limits   MAGNESIUM   PHOSPHORUS   PROTIME-INR   MAGNESIUM   PHOSPHORUS   LIPASE   TYPE & SCREEN     EKG Readings: (Independently Interpreted)   Initial Reading: No STEMI. Rhythm: Sinus Tachycardia. Heart Rate: 113. Ectopy: No Ectopy. T Waves Flipped: V1, V2, V4 and V3. Q Waves: V1 and V3. Clinical Impression: Sinus Tachycardia Other Impression: old anterior infarct.      ECG Results          EKG 12-lead (In process)  Result time 08/25/19 21:30:03    In process by Interface, Lab In [facility] (08/25/19 21:30:03)                 Narrative:    Test Reason : K92.2,    Vent. Rate : 113 BPM     Atrial Rate : 113 BPM     P-R Int : 142 ms          QRS Dur : 092 ms      QT Int : 346 ms       P-R-T Axes : 080 041 081  degrees     QTc Int : 474 ms    Sinus tachycardia with Premature supraventricular complexes  Septal infarct ,age undetermined  T wave abnormality, consider anterior ischemia  Abnormal ECG  When compared with ECG of 19-AUG-2019 22:32,  Premature supraventricular complexes are now Present  Septal infarct is now Present  ST no longer depressed in Inferior leads  ST no longer elevated in Lateral leads  T wave inversion now evident in Anterior leads    Referred By: [email or web address]   SELF           Confirmed By:                             Imaging Results    None          Medical Decision Making:   Differential Diagnosis:   Hematemesis, ulcer, varices.   ED Management:  6:12 PM     Still nauseated. Will offer haldol for nausea.     7:17 PM    Nausea better after haldol admin. Still tachycardic.  Benign hgb, but could be concentrated. Will obs.                       Clinical Impression:       ICD-10-CM ICD-9-CM   1. ESRD (end stage renal disease) N18.6 585.6   2. GI bleed K92.2 578.9                                Carlos Baker MD  08/25/19 [id number]

## 2019-08-25 NOTE — ED TRIAGE NOTES
Pt arrived POV with family from nursing home. Pt reports having increased weakness since being discharged. Last dialysis on Friday. Pt has been vomiting large amounts of coffee ground emesis.

## 2019-08-26 NOTE — PROGRESS NOTES
HD treatment started. No complications with access to right upper arm. Lines secured and telemetry in place.

## 2019-08-26 NOTE — H&P
Hospital Medicine  History and Physical      Patient Name: Vaughn Retana  MRN:  [id number]  [facility] Medicine Team: [facility] HOSP MED B Susan Turcios MD  Date of Admission:  8/25/2019     Principal Problem:  Coffee ground emesis   Primary Care Physician: Yvette Zelaya NP       History of Present Illness:    Mr. Vaughn Retana is a 54 y.o. male with chronic GIB and esophagitis who presents to the ED for evaluation of coffee ground emesis.  He was just admitted to the hospital 8/19-8/21 for a GIB.  During that admission, he was admitted to the ICU for significant hematemesis and airway watch - as he was found to have a hemoglobin drop from a baseline of 11-12 down to 9 on admit, trending down to 6.8.  He received 1 unit PRBCs.  EGD was performed 8/2 at [facility] which showed grade A esophagitis, so repeat EGD was not performed during his [facility] hospitalization.  He was discharged home on Protonix 40mg PO BID.  On the day of admission, he was found to have CGE.  He was sent to the ED for further evaluation.  Upon arrival, he endorsed feeling dizzy and fatigued.  He isnt on any blood thinners or NSAIDS.    Upon arrival to the ED, he was found to have a /81 with  that gradually dropped into the 90s.  Labs were notable for Hgb 12, up from 10 on discharge.  He was given an IV PPI and admitted to Hospital Medicine for further management.        Review of Systems:  Constitutional: Negative for chills, fatigue, fever.   HENT: Negative for sore throat, trouble swallowing.    Eyes: Negative for photophobia, visual disturbance.   Respiratory: Negative for cough, shortness of breath.    Cardiovascular: Negative for chest pain, palpitations, leg swelling.   Gastrointestinal: + nausea and vomiting  Endocrine: Negative for cold intolerance, heat intolerance.   Genitourinary: Negative for dysuria, frequency.   Musculoskeletal: Negative for arthralgias, myalgias.   Skin: Negative for rash, wound, erythema    Neurological:  + weakness, light-headedness.   Psychiatric/Behavioral: Negative for confusion, hallucinations, anxiety  All other systems reviewed and are negative.      Past Medical History: Patient has a past medical history of Amputation stump pain (9/10/2013), Aspiration pneumonia (7/27/2015), Asterixis (11/8/2016), C. 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      Past Surgical History: Patient has a past surgical history that includes R AVF (9/12/12); Leg amputation through knee (12/18/2013); Foot amputation through metatarsal; Colonoscopy; Colonoscopy (N/A, 4/4/2017); Esophagogastroduodenoscopy (N/A, 6/12/2018); Upper gastrointestinal endoscopy; and Esophagogastroduodenoscopy (N/A, 3/7/2019).      Social History: Patient reports that he has quit smoking. He has a 10.00 pack-year smoking history. He has never used smokeless tobacco. He reports that he does not drink alcohol or use drugs.      Family History: Patient's family history includes Alcohol abuse in his maternal grandmother; Diabetes in his brother and  maternal grandfather; Early death in his mother; Heart disease in his father; Hyperlipidemia in his father; Hypertension in his father and sister; Kidney disease in his father.      Medications: Scheduled Meds:   atorvastatin  80 mg Oral QHS    [START ON 8/26/2019] metoclopramide HCl  5 mg Oral TID AC    [START ON 8/26/2019] sevelamer carbonate  1,600 mg Oral TID WM    sucralfate  0.5 g Oral BID     Continuous Infusions:  PRN Meds:.acetaminophen, dextrose 50%, dextrose 50%, glucagon (human recombinant), glucose, glucose, HYDROcodone-acetaminophen, HYDROcodone-acetaminophen, ondansetron, senna-docusate 8.6-50 mg, sodium chloride 0.9%, trazodone      Allergies: Patient is allergic to fosrenol [lanthanum].      Physical Exam:    Temp:  [97.7 °F (36.5 °C)]   Pulse:  [106-120]   Resp:  [12-23]   BP: (102-166)/(69-99)   SpO2:  [100 %]     Constitutional: Appears sickly  Head: Normocephalic and atraumatic.   Mouth/Throat: Oropharynx is clear and moist.   Eyes: EOM are normal. Pupils are equal, round, and reactive to light. No scleral icterus.   Neck: Normal range of motion. Neck supple.   Cardiovascular: Normal heart rate.  Regular heart rhythm.  No murmur heard.  Pulmonary/Chest: Effort normal. No respiratory distress. No wheezes, rales, or rhonchi  Abdominal: Soft. Bowel sounds are normal.  No distension.  No tenderness  Musculoskeletal: Normal range of motion. No edema.   Neurological: Alert and oriented to person, place, and time.   Skin: Skin is warm and dry.   Psychiatric: Normal mood and affect. Behavior is normal.         Intake/Output Summary (Last 24 hours) at 8/25/2019 2203  Last data filed at 8/25/2019 1830  Gross per 24 hour   Intake --   Output 600 ml   Net -600 ml     Recent Labs   Lab 08/22/19  1604 08/23/19  0335 08/25/19  1742   WBC 9.16 7.92 10.85   HGB 10.3* 10.3* 12.7*   HCT 31.3* 31.8* 40.2    187 183     Recent Labs   Lab 08/19/19  0756  08/22/19  0341 08/23/19  0335 08/25/19  1956   NA  141   < > 143 142 142   K 3.5   < > 3.7 3.2* 4.0   CL 95   < > 105 101 103   CO2 31*   < > 25 28 20*   BUN 23*   < > 8 15 30*   CREATININE 11.0*   < > 5.9* 8.7* 11.3*      < > 80 120* 123*   CALCIUM 9.7   < > 9.7 9.5 9.8   MG 2.1   < > 2.2 2.2 2.3   PHOS 2.6*   < > 2.8 2.7 3.8   LIPASE 87*  --   --   --  35   AMYLASE  --   --   --   --  294*    < > = values in this interval not displayed.     Recent Labs   Lab 08/22/19  0341 08/23/19  0335 08/25/19 1956   ALKPHOS 242* 237* 242*   ALT 12 10 11   AST 24 22 22   ALBUMIN 3.5 3.4* 3.6   PROT 8.5* 8.4 8.8*   BILITOT 0.6 0.5 0.4   INR 1.1 1.1 1.1      Recent Labs     08/25/19 1956   LACTATE 2.6*      Recent Labs     08/25/19 1956   TROPONINI 0.229*         Assessment and Plan:    Mr. Vaughn Retana is a 54 y.o. male who presented to [facility] on 8/25/2019 with CGE.    GI Hemorrhage with Coffee Ground Emesis  Chronic Blood Loss Anemia  Erosive Gastritis  · Hgb 12 on admit, baseline around 10  · S/p Protonix 80mg IV x 1 in the ED.  Start Protonix 40mg IV BID  · Continue Carafate  · GI consulted, appreciate assistance  · NPO at midnight for possible EGD - last EGD 8/2 at [facility] with gastritis    Renovascular HTN  · Chronic issue but hypotensive  · Hold Coreg with GIB and hypotension    ESRD on HD  · HD MWF  · Nephro consulted  · Continue Renvela 1600mg PO TID       Diet:  Clear liquid  GI PPx:  PPI  DVT PPx:  Holding with GIB  Goals of Care:  Full      Disposition:  Pending GI recs return to Nursing Home  Discharge Needs:  TBD      Susan Turcios MD  [facility]  Cell:  [phone]  Spectra:  [phone]  Pager:  [phone]

## 2019-08-26 NOTE — ASSESSMENT & PLAN NOTE
Patient has had multiple EGDs with known esophagitis. Most recent per OSH records 8/2 with grade A esophagitis. Currently hemodynamically stable, Hgb 9.9. Unclear what medications he is receiving at nursing home.    - Recommend IV PPI bid (can be transitioned to PO bid when able to tolerate for 8-12 weeks)  - Scheduled zofran inpatient. If no relief, consider IV reglan  - Advance diet as tolerated. Recommend small, frequent meals, low fat  - No need for EGD at this time as most recent one from 8/2 with only grade A esophagitis and no evidence of GI bleeding  - Needs outpatient gastric emptying study

## 2019-08-26 NOTE — HPI
Mr Retana is a 53yo male with a past medical history of significant for ESRD on HD MWF, esophagitis, and chronic GIB who presented to the ED with c/c of coffee ground emesis. He has been admitted to hospital medicine for further management. At this time, he denies SOB, CP, HA, dizziness, N/V/D, abdominal pain, fatigue, fever, and swelling to his lower extremities. Nephrology has been consulted for management of ESRD and HD while in-patient.     -HPI was obtained from the patient and the patient's chart.

## 2019-08-26 NOTE — ED NOTES
Attempted to call report x 1, unsuccessful. Room not yet assigned to nurse, per charge nurse. Will try again.

## 2019-08-26 NOTE — SUBJECTIVE & OBJECTIVE
Past Medical History:   Diagnosis Date    Amputation stump pain 9/10/2013    Aspiration pneumonia 7/27/2015    Asterixis 11/8/2016    C. difficile colitis 8/7/2015    Cholelithiasis without obstruction 8/25/2015    Chronic diastolic heart failure     2-23-17   1 - Low normal to mildly depressed left ventricular systolic function (EF 50-55%).    2 - Right ventricular enlargement with mildly depressed systolic function.    3 - Left ventricular diastolic dysfunction.    4 - Right atrial enlargement.    5 - Severe tricuspid regurgitation.    6 - Pulmonary hypertension. The estimated PA systolic pressure is 86 mmHg.    7 - Increased central venous pressure.     Chronic low back pain 12/1/2015    Closed head injury 9/8/2016    ESRD on hemodialysis 2/7/2013    MWF at [facility]   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    Metabolic acidosis, IAG, reduced excretion of inorganic acids     Myoclonic jerking 9/20/2016    Noncompliance with medication regimen 12/4/2018    Secondary hyperparathyroidism (of renal origin)     Secondary pulmonary hypertension 3/23/2017    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    TB lung, latent 08/25/2015    Negative Quantiferon Gold 3-23-17       Past Surgical History:   Procedure Laterality Date    AMPUTATION, BELOW KNEE Left 12/18/2013    Performed by Elgin Houston MD at [facility] OR 1ST FLR    COLONOSCOPY      COLONOSCOPY N/A 4/4/2017    Performed by Walker Stern MD at [facility] ENDO (2ND FLR)    EGD (ESOPHAGOGASTRODUODENOSCOPY) N/A 3/7/2019    Performed by  Man Galicia MD at [facility] ENDO (2ND FLR)    EGD (ESOPHAGOGASTRODUODENOSCOPY) N/A 6/12/2018    Performed by Man Galicia MD at [facility] ENDO (2ND FLR)    ESOPHAGOGASTRODUODENOSCOPY (EGD) N/A 5/22/2018    Performed by Ke Sparks MD at [facility] ENDO (2ND FLR)    ESOPHAGOGASTRODUODENOSCOPY (EGD) N/A 3/16/2018    Performed by Kevin De La Paz MD at [facility] ENDO (2ND FLR)    ESOPHAGOGASTRODUODENOSCOPY (EGD) N/A 3/8/2018    Performed by Man Galicia MD at [facility] ENDO (2ND FLR)    ESOPHAGOGASTRODUODENOSCOPY (EGD) N/A 4/4/2017    Performed by Walker Stern MD at [facility] ENDO (2ND FLR)    ESOPHAGOGASTRODUODENOSCOPY (EGD) N/A 10/17/2014    Performed by [facility] at [facility] (2[facility] FLR)    FISTULOGRAM Right 9/18/2014    Performed by Grayson Hubbard MD at [facility] CATH LAB    FOOT AMPUTATION THROUGH METATARSAL      left foot    LEG AMPUTATION THROUGH KNEE  12/18/2013    left BKA    R AVF  9/12/12    UPPER GASTROINTESTINAL ENDOSCOPY         Review of patient's allergies indicates:   Allergen Reactions    Fosrenol [lanthanum] Nausea And Vomiting     Nausea and vomiting     Family History     Problem Relation (Age of Onset)    Alcohol abuse Maternal Grandmother    Diabetes Brother, Maternal Grandfather    Early death Mother    Heart disease Father    Hyperlipidemia Father    Hypertension Father, Sister    Kidney disease Father        Tobacco Use    Smoking status: Former Smoker     Packs/day: 1.00     Years: 10.00     Pack years: 10.00    Smokeless tobacco: Never Used   Substance and Sexual Activity    Alcohol use: No    Drug use: No    Sexual activity: Yes     Partners: Female     Birth control/protection: None     Review of Systems   Constitutional: Negative for chills, fatigue and fever.   HENT: Positive for trouble swallowing. Negative for nosebleeds.    Respiratory: Negative for cough and shortness of breath.    Cardiovascular: Negative for chest pain and leg swelling.   Gastrointestinal: Positive for  nausea and vomiting. Negative for abdominal distention, abdominal pain, blood in stool, constipation and diarrhea.   Genitourinary: Negative for dysuria and hematuria.   Musculoskeletal: Negative for arthralgias.   Skin: Negative for pallor.   Neurological: Negative for light-headedness and headaches.   Hematological: Does not bruise/bleed easily.   Psychiatric/Behavioral: Negative for behavioral problems and confusion.     Objective:     Vital Signs (Most Recent):  Temp: 98.5 °F (36.9 °C) (08/26/19 0405)  Pulse: 97 (08/26/19 0405)  Resp: 20 (08/26/19 0405)  BP: 131/83 (08/26/19 0405)  SpO2: 100 % (08/26/19 0405) Vital Signs (24h Range):  Temp:  [97.7 °F (36.5 °C)-98.5 °F (36.9 °C)] 98.5 °F (36.9 °C)  Pulse:  [] 97  Resp:  [12-23] 20  SpO2:  [95 %-100 %] 100 %  BP: ()/(58-99) 131/83     Weight: 59.9 kg (132 lb) (08/26/19 0145)  Body mass index is 21.97 kg/m².      Intake/Output Summary (Last 24 hours) at 8/26/2019 0751  Last data filed at 8/25/2019 1830  Gross per 24 hour   Intake --   Output 600 ml   Net -600 ml       Lines/Drains/Airways     Drain                 Hemodialysis AV Fistula Right upper arm -- days         Hemodialysis AV Fistula Right upper arm -- days          Peripheral Intravenous Line                 Peripheral IV - Single Lumen 08/25/19 1742 20 G Left Hand less than 1 day                Physical Exam   Constitutional: No distress.   HENT:   Mouth/Throat: Oropharynx is clear and moist.   Neck: Neck supple.   Cardiovascular: Normal rate and regular rhythm.   No murmur heard.  Pulmonary/Chest: Effort normal and breath sounds normal.   Abdominal: Soft. Bowel sounds are normal. He exhibits no distension. There is no tenderness. There is no rebound and no guarding.   Neurological: He is alert.   Skin: Skin is warm and dry. No rash noted.   Psychiatric: He has a normal mood and affect. His behavior is normal.   Nursing note and vitals reviewed.      Significant Labs:  All pertinent lab  results from the last 24 hours have been reviewed.    Significant Imaging:  Imaging results within the past 24 hours have been reviewed.

## 2019-08-26 NOTE — CONSULTS
[facility]  Nephrology  Consult Note    Patient Name: Vaughn Retana  MRN: [id number]  Admission Date: 8/25/2019  Hospital Length of Stay: 0 days  Attending Provider: Gavino Cordoba MD   Primary Care Physician: Yvette Zelaya NP  Principal Problem:Coffee ground emesis    Inpatient consult to Nephrology  Consult performed by: Ray Ortiz NP  Consult ordered by: Susan Turcios MD  Reason for consult: Management of ESRD and HD        Subjective:     HPI: Mr Retana is a 55yo male with a past medical history of significant for ESRD on HD MWF, esophagitis, and chronic GIB who presented to the ED with c/c of coffee ground emesis. He has been admitted to hospital medicine for further management. At this time, he denies SOB, CP, HA, dizziness, N/V/D, abdominal pain, fatigue, fever, and swelling to his lower extremities. Nephrology has been consulted for management of ESRD and HD while in-patient.     -HPI was obtained from the patient and the patient's chart.       Past Medical History:   Diagnosis Date    Amputation stump pain 9/10/2013    Aspiration pneumonia 7/27/2015    Asterixis 11/8/2016    C. difficile colitis 8/7/2015    Cholelithiasis without obstruction 8/25/2015    Chronic diastolic heart failure     2-23-17   1 - Low normal to mildly depressed left ventricular systolic function (EF 50-55%).    2 - Right ventricular enlargement with mildly depressed systolic function.    3 - Left ventricular diastolic dysfunction.    4 - Right atrial enlargement.    5 - Severe tricuspid regurgitation.    6 - Pulmonary hypertension. The estimated PA systolic pressure is 86 mmHg.    7 - Increased central venous pressure.    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 8/1/2015    Metabolic acidosis, IAG, reduced excretion of inorganic acids     Myoclonic jerking 9/20/2016    Noncompliance with medication regimen 12/4/2018    Secondary hyperparathyroidism (of renal origin)     Secondary pulmonary hypertension 3/23/2017    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    TB lung, latent 08/25/2015    Negative Quantiferon Gold 3-23-17       Past Surgical History:   Procedure Laterality Date    AMPUTATION, BELOW KNEE Left 12/18/2013    Performed by Elgin Houston MD at [facility] OR 1ST FLR    COLONOSCOPY      COLONOSCOPY N/A 4/4/2017    Performed by Walker Stern MD at [facility] ENDO (2ND FLR)    EGD (ESOPHAGOGASTRODUODENOSCOPY) N/A 3/7/2019    Performed by Man Galicia MD at [facility] ENDO (2ND FLR)    EGD (ESOPHAGOGASTRODUODENOSCOPY) N/A 6/12/2018    Performed by Man Galicia MD at [facility] ENDO (2ND FLR)    ESOPHAGOGASTRODUODENOSCOPY (EGD) N/A 5/22/2018    Performed by Ke Sparks MD at [facility] ENDO (2ND FLR)    ESOPHAGOGASTRODUODENOSCOPY (EGD) N/A 3/16/2018    Performed by Kevin De La Paz MD at [facility] ENDO (2ND FLR)    ESOPHAGOGASTRODUODENOSCOPY (EGD) N/A 3/8/2018    Performed by Man Galicia MD at [facility] ENDO (2ND FLR)    ESOPHAGOGASTRODUODENOSCOPY (EGD) N/A 4/4/2017    Performed by Walker Stern MD at [facility] ENDO (2ND FLR)    ESOPHAGOGASTRODUODENOSCOPY (EGD) N/A 10/17/2014    Performed by Man Galicia MD at [facility] ENDO (2ND FLR)    FISTULOGRAM Right 9/18/2014    Performed by Grayson Hubbard MD at [facility] CATH LAB    FOOT AMPUTATION THROUGH METATARSAL      left foot    LEG AMPUTATION THROUGH KNEE  12/18/2013    left BKA    R AVF  9/12/12    UPPER  GASTROINTESTINAL ENDOSCOPY         Review of patient's allergies indicates:   Allergen Reactions    Fosrenol [lanthanum] Nausea And Vomiting     Nausea and vomiting     Current Facility-Administered Medications   Medication Frequency    0.9%  NaCl infusion Once    acetaminophen tablet 650 mg Q8H PRN    atorvastatin tablet 80 mg QHS    dextrose 10% (D10W) Bolus PRN    dextrose 10% (D10W) Bolus PRN    glucagon (human recombinant) injection 1 mg PRN    glucose chewable tablet 16 g PRN    glucose chewable tablet 24 g PRN    HYDROcodone-acetaminophen  mg per tablet 1 tablet Q4H PRN    HYDROcodone-acetaminophen 5-325 mg per tablet 1 tablet Q4H PRN    metoclopramide HCl tablet 5 mg TID AC    ondansetron injection 4 mg Q8H PRN    pantoprazole EC tablet 40 mg BID AC    senna-docusate 8.6-50 mg per tablet 1 tablet BID PRN    sevelamer carbonate tablet 1,600 mg TID WM    sodium chloride 0.9% flush 5 mL PRN    sucralfate 100 mg/mL suspension 0.5 g BID    trazodone split tablet 25 mg Nightly PRN     Family History     Problem Relation (Age of Onset)    Alcohol abuse Maternal Grandmother    Diabetes Brother, Maternal Grandfather    Early death Mother    Heart disease Father    Hyperlipidemia Father    Hypertension Father, Sister    Kidney disease Father        Tobacco Use    Smoking status: Former Smoker     Packs/day: 1.00     Years: 10.00     Pack years: 10.00    Smokeless tobacco: Never Used   Substance and Sexual Activity    Alcohol use: No    Drug use: No    Sexual activity: Yes     Partners: Female     Birth control/protection: None     Review of Systems   Constitutional: Negative.    HENT: Negative.    Eyes: Negative.    Respiratory: Negative.    Cardiovascular: Negative.    Gastrointestinal: Negative.    Endocrine: Negative.    Genitourinary: Negative.    Musculoskeletal: Negative.    Skin: Negative.    Neurological: Negative.    Psychiatric/Behavioral: Negative.      Objective:     Vital  Signs (Most Recent):  Temp: 97.2 °F (36.2 °C) (08/26/19 0820)  Pulse: 90 (08/26/19 1200)  Resp: 20 (08/26/19 0820)  BP: (!) 159/84 (08/26/19 0820)  SpO2: 100 % (08/26/19 0405)  O2 Device (Oxygen Therapy): room air (08/26/19 0820) Vital Signs (24h Range):  Temp:  [97.2 °F (36.2 °C)-98.5 °F (36.9 °C)] 97.2 °F (36.2 °C)  Pulse:  [] 90  Resp:  [12-23] 20  SpO2:  [95 %-100 %] 100 %  BP: ()/(58-99) 159/84     Weight: 59.9 kg (132 lb) (08/26/19 0145)  Body mass index is 21.97 kg/m².  Body surface area is 1.66 meters squared.    I/O last 3 completed shifts:  In: 120 [P.O.:120]  Out: 1050 [Emesis/NG output:1050]    Physical Exam   Constitutional: He is oriented to person, place, and time. He appears well-developed and well-nourished. No distress.   HENT:   Head: Normocephalic and atraumatic.   Eyes: Pupils are equal, round, and reactive to light. Conjunctivae and EOM are normal.   Neck: Normal range of motion. Neck supple.   Cardiovascular: Normal rate and regular rhythm.   Pulmonary/Chest: Effort normal and breath sounds normal.   Abdominal: Soft. Bowel sounds are normal.   Musculoskeletal: Normal range of motion. He exhibits no edema, tenderness or deformity.   Neurological: He is alert and oriented to person, place, and time.   Skin: Skin is warm and dry. No rash noted. He is not diaphoretic. No erythema. No pallor.   RUE AVG with +thrill/bruit   Psychiatric: He has a normal mood and affect. His behavior is normal.       Significant Labs:  CBC:   Recent Labs   Lab 08/26/19  0456   WBC 8.75   RBC 3.09*   HGB 9.9*   HCT 31.9*      *   MCH 32.0*   MCHC 31.0*     CMP:   Recent Labs   Lab 08/26/19  0456   GLU 83   CALCIUM 9.2   ALBUMIN 3.4*   PROT 8.4      K 4.0   CO2 19*      BUN 36*   CREATININE 12.2*   ALKPHOS 225*   ALT 10   AST 20   BILITOT 0.4     All labs within the past 24 hours have been reviewed.      Assessment/Plan:     ESRD on hemodialysis  Outpatient HD  Information:    -Outpatient HD unit: [facility]  -Nephrologist: pt is unsure  -HD tx days: MWF  -HD tx time: 3 1/2hrs  -Last HD tx: Friday 8/23/19  -HD access: RUE AVF  -HD modality: iHD  -Residual urine: anuric   -EDW: pt is unsure     Inpatient HD Plan:    -Plan for maintenance HD today per pt's MWF HD schedule  -Will request outpatient HD records for review   -Renal diet  -Strict I/O's and daily weights  -Daily renal function panels   -Will continue to follow while inpatient         Thank you for your consult. I will follow-up with patient. Please contact us if you have any additional questions.    Ray Ortiz NP  Nephrology  [facility]

## 2019-08-26 NOTE — CONSULTS
[facility]  Gastroenterology  Consult Note    Patient Name: Vaughn Retana  MRN: [id number]  Admission Date: 8/25/2019  Hospital Length of Stay: 0 days  Code Status: Full Code   Attending Provider: Gavino Cordoba MD   Consulting Provider: Fabio Jones MD  Primary Care Physician: Yvette Zelaya NP  Principal Problem:Coffee ground emesis    Inpatient consult to Gastroenterology  Consult performed by: Fabio Jones MD  Consult ordered by: Susan Turcios MD        Subjective:     HPI:  Vaughn Retana is a 54 y.o. male with PMH ESRD, h/o esophagitis who presents with complaints of coffee ground emesis with no known trigger and no aggravating/alleviating factors. Patient known to GI service. Multiple episodes prior to presentation with the ED. EGD in 3/2019 showed grade D esophagitis. Also has a history of non-bleeding gastric ulcer (not seen on most recent EGDs this year). He was admitted to [facility] twice early August for same, EGD 8/2 showed grade A esophagitis with no active bleeding.  Was admitted again 8/19 - 8/23 with coffee ground emesis. Hgb down to 6.8 a the time but likely lab error as it was up to 9.6 after 1U PRBC. No EGD done given recent one at [facility] with grade A esophagitis. Has not had GES completed.  Patient is a poor historian. He reports feeling well at time of discharge, but that same evening started vomiting blood and coffee ground again after eating some eggs. States he hasn't been able to keep anything down since then. Reports feeling like food gets stuck in upper chest. No early satiety, abdominal pain, change in BMs, blood in stool or black stool. Patient resides at nursing home and states he hasn't received any meds since recent discharge. Has not missed any dialysis since last discharge. Currently, no nausea and he is hungry.  Tachy to 120 on admission, but high BPs -180s. Hgb here 9.9 from b/l 12-13 (12.7 on last discharge).    Past Medical  History:   Diagnosis Date    Amputation stump pain 9/10/2013    Aspiration pneumonia 7/27/2015    Asterixis 11/8/2016    C. difficile colitis 8/7/2015    Cholelithiasis without obstruction 8/25/2015    Chronic diastolic heart failure     2-23-17   1 - Low normal to mildly depressed left ventricular systolic function (EF 50-55%).    2 - Right ventricular enlargement with mildly depressed systolic function.    3 - Left ventricular diastolic dysfunction.    4 - Right atrial enlargement.    5 - Severe tricuspid regurgitation.    6 - Pulmonary hypertension. The estimated PA systolic pressure is 86 mmHg.    7 - Increased central venous pressure.    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    Metabolic acidosis, IAG, reduced excretion of inorganic acids     Myoclonic jerking 9/20/2016    Noncompliance with medication regimen 12/4/2018    Secondary hyperparathyroidism (of renal origin)     Secondary pulmonary hypertension 3/23/2017    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    TB lung, latent 08/25/2015    Negative Quantiferon Gold 3-23-17       Past Surgical History:   Procedure Laterality Date    AMPUTATION, BELOW KNEE Left 12/18/2013    Performed by Elgin Houston MD at [facility] OR 1ST FLR    COLONOSCOPY      COLONOSCOPY N/A 4/4/2017    Performed by Walker Stern MD at [facility] ENDO (2ND FLR)    EGD (ESOPHAGOGASTRODUODENOSCOPY) N/A 3/7/2019    Performed by Man MAGAÑA  MD Grayson at [facility] ENDO (2ND FLR)    EGD (ESOPHAGOGASTRODUODENOSCOPY) N/A 6/12/2018    Performed by Man Galicia MD at [facility] ENDO (2ND FLR)    ESOPHAGOGASTRODUODENOSCOPY (EGD) N/A 5/22/2018    Performed by Ke Sparks MD at [facility] ENDO (2ND FLR)    ESOPHAGOGASTRODUODENOSCOPY (EGD) N/A 3/16/2018    Performed by Kevin De La Paz MD at [facility] ENDO (2ND FLR)    ESOPHAGOGASTRODUODENOSCOPY (EGD) N/A 3/8/2018    Performed by Man Galicia MD at [facility] ENDO (2ND FLR)    ESOPHAGOGASTRODUODENOSCOPY (EGD) N/A 4/4/2017    Performed by Walker Stern MD at [facility] ENDO (2ND FLR)    ESOPHAGOGASTRODUODENOSCOPY (EGD) N/A 10/17/2014    Performed by [facility] Galicia [facility] at [facility] (2ND FLR)    FISTULOGRAM Right 9/18/2014    Performed by Grayson Hubbard MD at [facility] CATH LAB    FOOT AMPUTATION THROUGH METATARSAL      left foot    LEG AMPUTATION THROUGH KNEE  12/18/2013    left BKA    R AVF  9/12/12    UPPER GASTROINTESTINAL ENDOSCOPY         Review of patient's allergies indicates:   Allergen Reactions    Fosrenol [lanthanum] Nausea And Vomiting     Nausea and vomiting     Family History     Problem Relation (Age of Onset)    Alcohol abuse Maternal Grandmother    Diabetes Brother, Maternal Grandfather    Early death Mother    Heart disease Father    Hyperlipidemia Father    Hypertension Father, Sister    Kidney disease Father        Tobacco Use    Smoking status: Former Smoker     Packs/day: 1.00     Years: 10.00     Pack years: 10.00    Smokeless tobacco: Never Used   Substance and Sexual Activity    Alcohol use: No    Drug use: No    Sexual activity: Yes     Partners: Female     Birth control/protection: None     Review of Systems   Constitutional: Negative for chills, fatigue and fever.   HENT: Positive for trouble swallowing. Negative for nosebleeds.    Respiratory: Negative for cough and shortness of breath.    Cardiovascular: Negative for chest pain and leg swelling.   Gastrointestinal: Positive for nausea and  vomiting. Negative for abdominal distention, abdominal pain, blood in stool, constipation and diarrhea.   Genitourinary: Negative for dysuria and hematuria.   Musculoskeletal: Negative for arthralgias.   Skin: Negative for pallor.   Neurological: Negative for light-headedness and headaches.   Hematological: Does not bruise/bleed easily.   Psychiatric/Behavioral: Negative for behavioral problems and confusion.     Objective:     Vital Signs (Most Recent):  Temp: 98.5 °F (36.9 °C) (08/26/19 0405)  Pulse: 97 (08/26/19 0405)  Resp: 20 (08/26/19 0405)  BP: 131/83 (08/26/19 0405)  SpO2: 100 % (08/26/19 0405) Vital Signs (24h Range):  Temp:  [97.7 °F (36.5 °C)-98.5 °F (36.9 °C)] 98.5 °F (36.9 °C)  Pulse:  [] 97  Resp:  [12-23] 20  SpO2:  [95 %-100 %] 100 %  BP: ()/(58-99) 131/83     Weight: 59.9 kg (132 lb) (08/26/19 0145)  Body mass index is 21.97 kg/m².      Intake/Output Summary (Last 24 hours) at 8/26/2019 0751  Last data filed at 8/25/2019 1830  Gross per 24 hour   Intake --   Output 600 ml   Net -600 ml       Lines/Drains/Airways     Drain                 Hemodialysis AV Fistula Right upper arm -- days         Hemodialysis AV Fistula Right upper arm -- days          Peripheral Intravenous Line                 Peripheral IV - Single Lumen 08/25/19 1742 20 G Left Hand less than 1 day                Physical Exam   Constitutional: No distress.   HENT:   Mouth/Throat: Oropharynx is clear and moist.   Neck: Neck supple.   Cardiovascular: Normal rate and regular rhythm.   No murmur heard.  Pulmonary/Chest: Effort normal and breath sounds normal.   Abdominal: Soft. Bowel sounds are normal. He exhibits no distension. There is no tenderness. There is no rebound and no guarding.   Neurological: He is alert.   Skin: Skin is warm and dry. No rash noted.   Psychiatric: He has a normal mood and affect. His behavior is normal.   Nursing note and vitals reviewed.      Significant Labs:  All pertinent lab results from the  last 24 hours have been reviewed.    Significant Imaging:  Imaging results within the past 24 hours have been reviewed.    Assessment/Plan:     * Coffee ground emesis  Patient has had multiple EGDs with known esophagitis. Most recent per OSH records 8/2 with grade A esophagitis. Currently hemodynamically stable, Hgb 9.9. Unclear what medications he is receiving at nursing home.    - Recommend IV PPI bid (can be transitioned to PO bid when able to tolerate for 8-12 weeks)  - Scheduled zofran inpatient. If no relief, consider IV reglan  - Trial of sucralfate  - Advance diet as tolerated. Recommend small, frequent meals, low fat  - No need for EGD at this time as most recent one from 8/2 with only grade A esophagitis and no evidence of GI bleeding  - Needs outpatient gastric emptying study    Thank you for your consult. I will follow-up with patient. Please contact us if you have any additional questions.    Fabio Jones MD  Gastroenterology  [facility]

## 2019-08-26 NOTE — HPI
Vaughn Retana is a 54 y.o. male with PMH ESRD, h/o esophagitis who presents with complaints of coffee ground emesis with no known trigger and no aggravating/alleviating factors. Patient known to GI service. Multiple episodes prior to presentation with the ED. EGD in 3/2019 showed grade D esophagitis. Also has a history of non-bleeding gastric ulcer (not seen on most recent EGDs this year). He was admitted to [facility] twice early August for same, EGD 8/2 showed grade A esophagitis with no active bleeding.  Was admitted again 8/19 - 8/23 with coffee ground emesis. Hgb down to 6.8 a the time but likely lab error as it was up to 9.6 after 1U PRBC. No EGD done given recent one at [facility] with grade A esophagitis. Has not had GES completed.  Patient is a poor historian. He reports feeling well at time of discharge, but that same evening started vomiting blood and coffee ground again after eating some eggs. States he hasn't been able to keep anything down since then. Reports feeling like food gets stuck in upper chest. No early satiety, abdominal pain, change in BMs, blood in stool or black stool. Patient resides at nursing home and states he hasn't received any meds since recent discharge. Has not missed any dialysis since last discharge. Currently, no nausea and he is hungry.  Tachy to 120 on admission, but high BPs -180s. Hgb here 9.9 from b/l 12-13 (12.7 on last discharge).

## 2019-08-26 NOTE — PLAN OF CARE
Patient's care deferred to [facility].  Patient's transportation set up by [facility].      Future Appointments   Date Time Provider Department Center   8/26/2019  2:30 PM DIALYSIS, ROSSANA MARQUIS [facility]YS [facility]   9/26/2019  2:00 PM MD DOROTHY Sellers GASTRO Rossana Veloz        08/26/19 1351   Final Note   Assessment Type Final Discharge Note   Anticipated Discharge Disposition shelter [facility]   What phone number can be called within the next 1-3 days to see how you are doing after discharge? [phone]   Hospital Follow Up  Appt(s) scheduled? Yes   Discharge plans and expectations educations in teach back method with documentation complete? Yes   Right Care Referral Info   Post Acute Recommendation Other  (Returned t [facility])

## 2019-08-26 NOTE — PROGRESS NOTES
Progress Note  Hospital Medicine    Admit Date: 8/25/2019  Length of Stay:  LOS: 0 days     SUBJECTIVE:         Follow-up For:  Coffee ground emesis    HPI/Interval history (See H&P for complete P,F,SHx) :     OVerview  Mr. Vaughn Retana is a 54 y.o. male with chronic GIB and esophagitis who presents to the ED for evaluation of coffee ground emesis.  He was just admitted to the hospital 8/19-8/21 for a GIB.  During that admission, he was admitted to the ICU for significant hematemesis and airway watch - as he was found to have a hemoglobin drop from a baseline of 11-12 down to 9 on admit, trending down to 6.8.  He received 1 unit PRBCs.  EGD was performed 8/2 at [facility] which showed grade A esophagitis, so repeat EGD was not performed during his [facility] hospitalization.  He was discharged home on Protonix 40mg PO BID.  On the day of admission, he was found to have CGE.  He was sent to the ED for further evaluation.  Upon arrival, he endorsed feeling dizzy and fatigued.  He isnt on any blood thinners or NSAIDS.     Upon arrival to the ED, he was found to have a /81 with  that gradually dropped into the 90s.     Interval history     Labs were notable for Hgb 12, up from 10 on discharge.  He was given an IV PPI and admitted to Hospital Medicine for further management.  Reports 4-5 episodes of vomiting since Saturday and Sunday.  Unclear whether he received antiemetics Zofran and Reglan as per discharge recommendation.  Will obtain medication administration record from nursing.  Started on clear liquids.  Advance diet as tolerated                Review of Systeems: List if applicable  Pain scale:\  Constitutional- Positive for Weakness  GI- Positive for Nausea, Vomiting      OBJECTIVE:     Vital Signs Range (Last 24H):  Temp:  [97.7 °F (36.5 °C)-98.5 °F (36.9 °C)]   Pulse:  []   Resp:  [12-23]   BP: ()/(58-99)   SpO2:  [95 %-100 %]     Physical Exam:  General- Patient alert and oriented x3   HEENT-  PERRLA, EOMI, OP clear  Neck- No JVD, Lymphadenopathy, Thyromegaly  CV- Regular rate and rhythm, No Murmur/cheryl/rubs  Resp- Lungs CTA Bilaterally, No increased WOB  Abdomen- Non tender/non-distended, BS normoactive x4 quads, no HSM  Extrem- No cyanosis, clubbing, edema.   Skin- No rashes, lesions, ulcers  Neuro- Strength 5/5 flexors/extensors,Intact sensation to light touch grossly      Medications:  Medication list was reviewed and changes noted under Assessment/Plan.      Current Facility-Administered Medications:     acetaminophen tablet 650 mg, 650 mg, Oral, Q8H PRN, Susan Trucios MD    atorvastatin tablet 80 mg, 80 mg, Oral, QHS, Susan Turcios MD    dextrose 10% (D10W) Bolus, 12.5 g, Intravenous, PRN, Susan Turcios MD    dextrose 10% (D10W) Bolus, 25 g, Intravenous, PRN, Susan Turcios MD    glucagon (human recombinant) injection 1 mg, 1 mg, Intramuscular, PRN, Susan Turcios MD    glucose chewable tablet 16 g, 16 g, Oral, PRN, Susan Turcios MD    glucose chewable tablet 24 g, 24 g, Oral, PRN, Susan Turcios MD    HYDROcodone-acetaminophen  mg per tablet 1 tablet, 1 tablet, Oral, Q4H PRN, Susan Turcios MD    HYDROcodone-acetaminophen 5-325 mg per tablet 1 tablet, 1 tablet, Oral, Q4H PRN, Susan Turcios MD    metoclopramide HCl tablet 5 mg, 5 mg, Oral, TID AC, Susan Turcios MD    ondansetron injection 4 mg, 4 mg, Intravenous, Q8H PRN, Susan Turcios MD, 4 mg at 08/26/19 0124    senna-docusate 8.6-50 mg per tablet 1 tablet, 1 tablet, Oral, BID PRN, Susan Turcios MD    sevelamer carbonate tablet 1,600 mg, 1,600 mg, Oral, TID WM, Susan Turcios MD    sodium chloride 0.9% flush 5 mL, 5 mL, Intravenous, PRN, Susan Turcios MD    sucralfate 100 mg/mL suspension 0.5 g, 0.5 g, Oral, BID, Susan Turcios MD    trazodone split tablet 25 mg, 25 mg, Oral, Nightly PRN, Susan Turcios MD    acetaminophen, Dextrose 10% Bolus, Dextrose 10% Bolus, glucagon (human  "recombinant), glucose, glucose, HYDROcodone-acetaminophen, HYDROcodone-acetaminophen, ondansetron, senna-docusate 8.6-50 mg, sodium chloride 0.9%, trazodone    Laboratory/Diagnostic Data:  Reviewed and noted in plan where applicable- Please see chart for full lab data.    Recent Labs   Lab 08/22/19  1604 08/23/19  0335 08/25/19  1742   WBC 9.16 7.92 10.85   HGB 10.3* 10.3* 12.7*   HCT 31.3* 31.8* 40.2    187 183       Recent Labs   Lab 08/19/19  0756  08/22/19  0341 08/23/19  0335 08/25/19 1956      < > 143 142 142   K 3.5   < > 3.7 3.2* 4.0   CL 95   < > 105 101 103   CO2 31*   < > 25 28 20*   BUN 23*   < > 8 15 30*   CREATININE 11.0*   < > 5.9* 8.7* 11.3*      < > 80 120* 123*   CALCIUM 9.7   < > 9.7 9.5 9.8   MG 2.1   < > 2.2 2.2 2.3   PHOS 2.6*   < > 2.8 2.7 3.8   LIPASE 87*  --   --   --  35   AMYLASE  --   --   --   --  294*    < > = values in this interval not displayed.       Recent Labs   Lab 08/22/19  0341 08/23/19  0335 08/25/19 1956   ALKPHOS 242* 237* 242*   ALT 12 10 11   AST 24 22 22   ALBUMIN 3.5 3.4* 3.6   PROT 8.5* 8.4 8.8*   BILITOT 0.6 0.5 0.4   INR 1.1 1.1 1.1        Microbiology labs for the last week  Microbiology Results (last 7 days)     ** No results found for the last 168 hours. **           Imaging Results    None         Estimated body mass index is 21.97 kg/m² as calculated from the following:    Height as of this encounter: 5' 5" (1.651 m).    Weight as of this encounter: 59.9 kg (132 lb).    I & O (Last 24H):    Intake/Output Summary (Last 24 hours) at 8/26/2019 3892  Last data filed at 8/25/2019 1830  Gross per 24 hour   Intake no documentation   Output 600 ml   Net -600 ml       Estimated Creatinine Clearance: 6.3 mL/min (A) (based on SCr of 11.3 mg/dL (H)).    ASSESSMENT/PLAN:     Active Problems:     *Hematemesis [K92.0]  hx of hematemesis and gastritis who presented with multiple episodes of coffee ground hematemesis. Hemodynamically stable. Received " Protonix 80mg IV. Multiple episodes prior to presentation with the ED. EGD in 3/2019 showed grade D esophagitis. Also has a history of non-bleeding gastric ulcer (not seen on most recent EGDs this year). He was admitted to [facility] twice early August for same, EGD 8/2 showed grade A esophagitis with no active bleeding Status post Gastroenterology evaluation.     8/26/29  · Hgb 12 on admit, baseline around 10  · S/p Protonix 80mg IV x 1 in the ED.  Start Protonix 40mg IV BID  · Continue Carafate  · GI consulted, appreciate assistance   Labs were notable for Hgb 12, up from 10 on discharge.  He was given an IV PPI and admitted to Hospital Medicine for further management.  Reports 4-5 episodes of vomiting since Saturday and Sunday.  Unclear whether he received antiemetics Zofran and Reglan as per discharge recommendation.  Will obtain medication administration record from nursing  - Needs outpatient gastric emptying study      Yes     Anemia [D64.9] anemia of chronic disease at baseline as above     Nausea/vomiting- improved with Zofran around the clock and Reglan 5 mg t.i.d. with meals.previously discharged with Reglan for 5 days   Unknown    Hypophosphatemia [E83.39] .   resolved . Nephrology following. phosphate binders for now   Unknown    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, without long-term current use of insulin [E11.29]Currently controlled with diet with his last HgbA1c being 4.1.  NPO for now  - Advance diet as tolerated after GI eval   Yes       Chronic    Renovascular hypertension [I15.0] blood pressure controlled off medication   Yes       Chronic    Chronic kidney disease-mineral and bone disorder [N18.9, E83.9, M89.9]   Yes       Chronic    Peripheral vascular disease in diabetes mellitus [E11.51]   Yes       Chronic    ESRD on hemodialysis [N18.6, Z99.2] continue with dialysis per Nephrology   Not Applicable       Chronic       MWF at [facility]       Secondary  hyperparathyroidism of renal origin [N25.81]   Yes       Chronic               Disposition- family does not want patient to return back to same nursing home.  niece wants to take him home after discharge    DVT prophylaxis addressed with: MING Cordoba MD  Attending Staff Physician  [facility]  pager- [phone] [facility] - [phone]

## 2019-08-26 NOTE — ASSESSMENT & PLAN NOTE
Patient has had multiple EGDs with known esophagitis. Most recent per OSH records 8/2 with grade A esophagitis. Currently hemodynamically stable, Hgb 9.9. Unclear what medications he is receiving at nursing home.    - Recommend IV PPI bid (can be transitioned to PO bid when able to tolerate for 8-12 weeks)  - Scheduled zofran inpatient. If no relief, consider IV reglan  - Trial of sucralfate  - Advance diet as tolerated. Recommend small, frequent meals, low fat  - No need for EGD at this time as most recent one from 8/2 with only grade A esophagitis and no evidence of GI bleeding  - Needs outpatient gastric emptying study

## 2019-08-26 NOTE — NURSING
Report given to dialysis nurse via phone. No changes from am assessment. Pt with no complaints nor requests at this time. Pt is in no distress. Heart monitor in place as per order. Iv to lt hand patent and flushes without resistance. Will continue to monitor as per unit protocol.

## 2019-08-26 NOTE — ED NOTES
Telemetry Verification   Patient placed on Telemetry Box  Verified with War Room  Box # [id number]   Monitor Tech Alyson   Rate 100   Rhythm Sinus Rhythm

## 2019-08-26 NOTE — PROGRESS NOTES
[facility] HEMODIALYSIS NOTE     Patient currently on hemodialysis for removal of uremic toxins and volume.     Patient seen and evaluated on hemodialysis, tolerating treatment, see HD flowsheet for vitals and assessments.      Ultrafiltration goal is 1L as tolerated, keep map >65     Labs have been reviewed and the dialysate bath has been adjusted.     Assessment/Plan:    -Patient seen on HD, tolerating treatment well, w/o complaints   -Renal diet  -Strict I/O's and daily weights  -Daily renal function panels  -Hbg 9.9, receives mircera outpatient   -Phos 4.1, continue renvela with meals, hold if pt is NPO   -Will continue to follow while inpatient       VANITA Rodas, AGNP-C  Nephrology  Pager:  [phone]

## 2019-08-26 NOTE — NURSING
Pt is vomitting small clear emesis. Pt is refusing zofran. Pt states he is not nauseated. Pt is in no distress. Call light in reach. Pt repeats instructions to call for assistance. Will continue to monitor pt as per unit protocol.

## 2019-08-26 NOTE — ASSESSMENT & PLAN NOTE
Outpatient HD Information:    -Outpatient HD unit: [facility]  -Nephrologist: pt is unsure  -HD tx days: MWF  -HD tx time: 3 1/2hrs  -Last HD tx: Friday 8/23/19  -HD access: RUE AVF  -HD modality: iHD  -Residual urine: anuric   -EDW: pt is unsure     Inpatient HD Plan:    -Plan for maintenance HD today per pt's MWF HD schedule  -Will request outpatient HD records for review   -Renal diet  -Strict I/O's and daily weights  -Daily renal function panels   -Will continue to follow while inpatient

## 2019-08-26 NOTE — SUBJECTIVE & OBJECTIVE
Past Medical History:   Diagnosis Date    Amputation stump pain 9/10/2013    Aspiration pneumonia 7/27/2015    Asterixis 11/8/2016    C. difficile colitis 8/7/2015    Cholelithiasis without obstruction 8/25/2015    Chronic diastolic heart failure     2-23-17   1 - Low normal to mildly depressed left ventricular systolic function (EF 50-55%).    2 - Right ventricular enlargement with mildly depressed systolic function.    3 - Left ventricular diastolic dysfunction.    4 - Right atrial enlargement.    5 - Severe tricuspid regurgitation.    6 - Pulmonary hypertension. The estimated PA systolic pressure is 86 mmHg.    7 - Increased central venous pressure.    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    Metabolic acidosis, IAG, reduced excretion of inorganic acids     Myoclonic jerking 9/20/2016    Noncompliance with medication regimen 12/4/2018    Secondary hyperparathyroidism (of renal origin)     Secondary pulmonary hypertension 3/23/2017    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    TB lung, latent 08/25/2015    Negative Quantiferon Gold 3-23-17       Past Surgical History:   Procedure Laterality Date    AMPUTATION, BELOW KNEE Left 12/18/2013    Performed by Elgin Houston MD at [facility] OR 1ST FLR    COLONOSCOPY      COLONOSCOPY N/A 4/4/2017    Performed by Walker Stern MD at [facility] ENDO (2ND FLR)    EGD (ESOPHAGOGASTRODUODENOSCOPY) N/A 3/7/2019    Performed by  Man Galicia MD at [facility] ENDO (2ND FLR)    EGD (ESOPHAGOGASTRODUODENOSCOPY) N/A 6/12/2018    Performed by Man Galicia MD at [facility] ENDO (2ND FLR)    ESOPHAGOGASTRODUODENOSCOPY (EGD) N/A 5/22/2018    Performed by Ke Sparks MD at [facility] ENDO (2ND FLR)    ESOPHAGOGASTRODUODENOSCOPY (EGD) N/A 3/16/2018    Performed by Kevin De La Paz MD at [facility] ENDO (2ND FLR)    ESOPHAGOGASTRODUODENOSCOPY (EGD) N/A 3/8/2018    Performed by Man Galicia MD at [facility] ENDO (2ND FLR)    ESOPHAGOGASTRODUODENOSCOPY (EGD) N/A 4/4/2017    Performed by Walker [facility] at [facility] (2ND FLR)    ESOPHAGOGASTRODUODENOSCOPY (EGD) N/A 10/17/2014    Performed by Man Galicia MD at [facility] (2ND FLR)    FISTULOGRAM Right 9/18/2014    Performed by Grayson Hubbard MD at [facility] CATH LAB    FOOT AMPUTATION THROUGH METATARSAL      left foot    LEG AMPUTATION THROUGH KNEE  12/18/2013    left BKA    R AVF  9/12/12    UPPER GASTROINTESTINAL ENDOSCOPY         Review of patient's allergies indicates:   Allergen Reactions    Fosrenol [lanthanum] Nausea And Vomiting     Nausea and vomiting     Current Facility-Administered Medications   Medication Frequency    0.9%  NaCl infusion Once    acetaminophen tablet 650 mg Q8H PRN    atorvastatin tablet 80 mg QHS    dextrose 10% (D10W) Bolus PRN    dextrose 10% (D10W) Bolus PRN    glucagon (human recombinant) injection 1 mg PRN    glucose chewable tablet 16 g PRN    glucose chewable tablet 24 g PRN    HYDROcodone-acetaminophen  mg per tablet 1 tablet Q4H PRN    HYDROcodone-acetaminophen 5-325 mg per tablet 1 tablet Q4H PRN    metoclopramide HCl tablet 5 mg TID AC    ondansetron injection 4 mg Q8H PRN    pantoprazole EC tablet 40 mg BID AC    senna-docusate 8.6-50 mg per tablet 1 tablet BID PRN    sevelamer carbonate tablet 1,600 mg TID WM    sodium chloride 0.9% flush 5 mL PRN    sucralfate 100 mg/mL suspension 0.5 g BID    trazodone split tablet 25 mg Nightly PRN      Family History     Problem Relation (Age of Onset)    Alcohol abuse Maternal Grandmother    Diabetes Brother, Maternal Grandfather    Early death Mother    Heart disease Father    Hyperlipidemia Father    Hypertension Father, Sister    Kidney disease Father        Tobacco Use    Smoking status: Former Smoker     Packs/day: 1.00     Years: 10.00     Pack years: 10.00    Smokeless tobacco: Never Used   Substance and Sexual Activity    Alcohol use: No    Drug use: No    Sexual activity: Yes     Partners: Female     Birth control/protection: None     Review of Systems   Constitutional: Negative.    HENT: Negative.    Eyes: Negative.    Respiratory: Negative.    Cardiovascular: Negative.    Gastrointestinal: Negative.    Endocrine: Negative.    Genitourinary: Negative.    Musculoskeletal: Negative.    Skin: Negative.    Neurological: Negative.    Psychiatric/Behavioral: Negative.      Objective:     Vital Signs (Most Recent):  Temp: 97.2 °F (36.2 °C) (08/26/19 0820)  Pulse: 90 (08/26/19 1200)  Resp: 20 (08/26/19 0820)  BP: (!) 159/84 (08/26/19 0820)  SpO2: 100 % (08/26/19 0405)  O2 Device (Oxygen Therapy): room air (08/26/19 0820) Vital Signs (24h Range):  Temp:  [97.2 °F (36.2 °C)-98.5 °F (36.9 °C)] 97.2 °F (36.2 °C)  Pulse:  [] 90  Resp:  [12-23] 20  SpO2:  [95 %-100 %] 100 %  BP: ()/(58-99) 159/84     Weight: 59.9 kg (132 lb) (08/26/19 0145)  Body mass index is 21.97 kg/m².  Body surface area is 1.66 meters squared.    I/O last 3 completed shifts:  In: 120 [P.O.:120]  Out: 1050 [Emesis/NG output:1050]    Physical Exam   Constitutional: He is oriented to person, place, and time. He appears well-developed and well-nourished. No distress.   HENT:   Head: Normocephalic and atraumatic.   Eyes: Pupils are equal, round, and reactive to light. Conjunctivae and EOM are normal.   Neck: Normal range of motion. Neck supple.   Cardiovascular: Normal rate and regular rhythm.   Pulmonary/Chest: Effort normal and  breath sounds normal.   Abdominal: Soft. Bowel sounds are normal.   Musculoskeletal: Normal range of motion. He exhibits no edema, tenderness or deformity.   Neurological: He is alert and oriented to person, place, and time.   Skin: Skin is warm and dry. No rash noted. He is not diaphoretic. No erythema. No pallor.   RUE AVG with +thrill/bruit   Psychiatric: He has a normal mood and affect. His behavior is normal.       Significant Labs:  CBC:   Recent Labs   Lab 08/26/19  0456   WBC 8.75   RBC 3.09*   HGB 9.9*   HCT 31.9*      *   MCH 32.0*   MCHC 31.0*     CMP:   Recent Labs   Lab 08/26/19 0456   GLU 83   CALCIUM 9.2   ALBUMIN 3.4*   PROT 8.4      K 4.0   CO2 19*      BUN 36*   CREATININE 12.2*   ALKPHOS 225*   ALT 10   AST 20   BILITOT 0.4     All labs within the past 24 hours have been reviewed.

## 2019-08-26 NOTE — PLAN OF CARE
08/26/19 1241   Post-Acute Status   Post-Acute Authorization Placement   Post-Acute Placement Status Awaiting Internal Medical Clearance     Patient not medically ready expected to return back to [facility] when medically ready.    Joselyn Rincon LMSW  [facility]   [id number]

## 2019-08-27 NOTE — PLAN OF CARE
Problem: Adult Inpatient Plan of Care  Goal: Plan of Care Review  Outcome: Ongoing (interventions implemented as appropriate)     08/27/19 4710   Plan of Care Review   Plan of Care Reviewed With patient     Pt had no falls. AAOx4. Upon arrival pt vomiting coffee grind emesis.MD notified and medication was ordered and given. HR elevated as well MD notified. PT advanced to clear liquid. Medication and nursing care given per MD order.

## 2019-08-27 NOTE — PLAN OF CARE
Follow-up With    Details      Why        Contact Info                  Yvette Zelaya NP  Go on 9/5/2019  Hospital follow-up at 10:00AM  [address]  [facility] [address]  [phone]

## 2019-08-27 NOTE — PLAN OF CARE
08/27/19 1628   Post-Acute Status   Post-Acute Authorization Placement   Post-Acute Placement Status Set-up Complete   Other Status No Post-Acute Service Needs     Patient discharging home with no needs. Patients family is coming to pick him up today [place] [facility] after 4:30.    Joselyn Rincon LMSW  [facility]   [id number]

## 2019-08-27 NOTE — TREATMENT PLAN
Gastroenterology Treatment Plan    Interval History  Continued nausea with spitting up overnight.     Plan  Patient has had multiple EGDs with known chronic esophagitis. Most recent per OSH records 8/2 with grade A esophagitis. Currently hemodynamically stable, Hgb stable.    - Continue PO PPI bid for 8-12 weeks  - Recommend addition of H2 blocker at night  - Trial of sucralfate  - Scheduled zofran inpatient. If no relief, consider IV reglan  - Advance diet as tolerated. Recommend small, frequent meals, low fat  - No need for EGD at this time as most recent one from 8/2 with only grade A esophagitis and no evidence of GI bleeding. Would repeat in 8-12 weeks  - Needs outpatient gastric emptying study    Thank you for involving us in the care of Vaughn Retana. We are signing-off. Please call with any additional questions, concerns or changes in the patient's clinical status.      Fabio Jones MD  Gastroenterology Fellow, PGY4  [facility]

## 2019-08-27 NOTE — HPI
54 y.o. male with ESRD, latant TB, HTN, HCV, c. dff colits, ICH, chronic pain, amputation, and chronic GIB and esophagitis who presents to the ED for evaluation of coffee ground emesis.  He was just admitted to the hospital 8/19-8/21 for a GIB.  During that admission, he was admitted to the ICU for significant hematemesis and airway watch - as he was found to have a hemoglobin drop from a baseline of 11-12 down to 9 on admit, trending down to 6.8.  He received 1 unit PRBCs.  EGD was performed 8/2 at [facility] which showed grade A esophagitis, so repeat EGD was not performed during his [facility].  He was discharged home on Protonix 40mg PO BID.  On the day of admission, he was found to have CGE.  He was sent to the ED for further evaluation.  Upon arrival, he endorsed feeling dizzy and fatigued.  He isnt on any blood thinners or NSAIDS.     Upon arrival to the ED, he was found to have a /81 with  that gradually dropped into the 90s.  Labs were notable for Hgb 12, up from 10 on discharge.  He was given an IV PPI and admitted to Hospital Medicine for further management.

## 2019-08-27 NOTE — ASSESSMENT & PLAN NOTE
· Hgb 12 on admit, baseline around 10  · S/p Protonix 80mg IV x 1 in the ED.  Start Protonix 40mg IV BID  · Continue Carafate  · GI consulted, appreciate assistance  · NPO at midnight for possible EGD - last EGD 8/2 at [facility] with gastritis

## 2019-08-27 NOTE — PLAN OF CARE
08/27/19 1525   Post-Acute Status   Post-Acute Authorization Placement;Other   Other Status See Comments     NATTY followed up with patient giacomo Funk to inform her that patient is medically ready for discharge today. SW reports that patient sister will be coming to pick him up today after 4:30 today to take him home. She reports that patient will not be returning back to [facility].     Joselyn Rincon LMSW  [facility]   [id number]

## 2019-08-27 NOTE — ASSESSMENT & PLAN NOTE
Grade 2 esophagitis, Hb 9.9 and stable    Recommend IV PPI bid (can be transitioned to PO bid when able to tolerate for 8-12 weeks)  - Scheduled zofran inpatient. If no relief, consider IV reglan  - Trial of sucralfate  - Advance diet as tolerated. Recommend small, frequent meals, low fat  - No need for EGD at this time as most recent one from 8/2 with only grade A esophagitis and no evidence of GI bleeding  - Needs outpatient gastric emptying study    8/27 - Hb 10.2

## 2019-08-27 NOTE — HOSPITAL COURSE
8/27 - no further N&V.  Tolerating liquids,  Will give solid food now.  If he does well, may be able to discharge pt.  Patient's symptoms improved with opoid hydrocodone.  He is now able to eat and tolerate solid food. Suspect iatrogenic opioid withdrawal as to the cause of this admission for N&V.  H/H stable. Will wean  Over 2 days.   Will set up outpatient gastric emptying study . Continue home protonix.

## 2019-08-27 NOTE — PLAN OF CARE
08/27/19 1509   Post-Acute Status   Post-Acute Authorization Placement;Other   Post-Acute Placement Status Referrals Sent      Patient medically ready and expected to discharge back to [facility]. NATTY contacted Bryanna in admissions [phone] and she reports that she will call NATTY back in regard if patient can be admitted back there today.    Joselyn Rincon, NERY  [facility]   [id number]

## 2019-08-27 NOTE — PLAN OF CARE
met patient to obtain discharge planning assessment.  Information obtained from patient.  Patient may need a ride back to [facility], if so [facility] will set up patient's transportation.   name and number written on the board at the bedside.    Yvette Zelaya NP       [facility]/pharmacy #[id number] - [place], [place] - [address]  [address]  [place] [address]  Phone: [phone] Fax: [phone]    [facility]  [address] [address]  Phone: [phone] Fax: [phone]    [id number] - [place], [place] - [address] [facility] [address]  [address]  [facility] [address]  Phone: [phone] Fax: [phone]    Payor: MEDICARE / Plan: MEDICARE PART A & B / Product Type: [facility] /       08/27/19 0900   Discharge Assessment   Assessment Type Discharge Planning Assessment   Confirmed/corrected address and phone number on facesheet? Yes   Assessment information obtained from? Patient   Expected Length of Stay (days) 3   Communicated expected length of stay with patient/caregiver yes   Prior to hospitilization cognitive status: Alert/Oriented   Prior to hospitalization functional status: Assistive Equipment;Needs Assistance;Wheelchair Bound   Current cognitive status: Alert/Oriented   Current Functional Status: Assistive Equipment;Wheelchair Bound;Needs Assistance   Facility Arrived From: [facility]       [phone]   Lives With facility resident   Able to Return to Prior Arrangements yes   Is patient able to care for self after discharge? Yes   Patient's perception of discharge disposition nursing home   Readmission Within the Last 30 Days other (see comments)  (Patient readmitted within 36 hours of being discharged.)   Patient currently being followed by outpatient case management? No   Patient currently receives any other outside agency services?  Yes   How many hours a day does the patient receive services? 4   Name and contact number of agency or person providing outside services [facility]-[phone]   Is it the patient/care giver preference to resume care with the current outside agency? Yes   Equipment Currently Used at Home wheelchair   Do you have any problems affording any of your prescribed medications? No   Is the patient taking medications as prescribed? yes   Does the patient have transportation home? No   Dialysis Name and Scheduled days [facility]-M/W/F at 5am.   Does the patient receive services at the [facility]madin Clinic? No   Discharge Plan A Return to nursing home   Discharge Plan B Return to Nursing Home   DME Needed Upon Discharge    (TBD)   Patient/Family in Agreement with Plan yes

## 2019-08-27 NOTE — PROGRESS NOTES
Hd treatment complete. Duration of treatment 2 hours and 15 minutes. No fluid removed due to low blood pressure. Needles removed and hemostasis achieved. Dressing intact and no drainage noted. Thrill and bruit present.

## 2019-08-27 NOTE — DISCHARGE SUMMARY
[facility] Hospital Medicine  Discharge Summary      Patient Name: Vaughn Retana  MRN: [id number]  Admission Date: 8/25/2019  Hospital Length of Stay: 0 days  Discharge Date and Time: No discharge date for patient encounter.  Attending Physician: Renea Gallegos MD   Discharging Provider: Renea Gallegos MD  Primary Care Provider: Yvette Zelaya NP  Hospital Medicine Team: [facility] HOSP MED B Renea Gallegos MD    HPI:   54 y.o. male with ESRD, latant TB, HTN, HCV, c. dff colits, ICH, chronic pain, amputation, and chronic GIB and esophagitis who presents to the ED for evaluation of coffee ground emesis.  He was just admitted to the hospital 8/19-8/21 for a GIB.  During that admission, he was admitted to the ICU for significant hematemesis and airway watch - as he was found to have a hemoglobin drop from a baseline of 11-12 down to 9 on admit, trending down to 6.8.  He received 1 unit PRBCs.  EGD was performed 8/2 at [facility] which showed grade A esophagitis, so repeat EGD was not performed during his [facility] hospitalization.  He was discharged home on Protonix 40mg PO BID.  On the day of admission, he was found to have CGE.  He was sent to the ED for further evaluation.  Upon arrival, he endorsed feeling dizzy and fatigued.  He isnt on any blood thinners or NSAIDS.     Upon arrival to the ED, he was found to have a /81 with  that gradually dropped into the 90s.  Labs were notable for Hgb 12, up from 10 on discharge.  He was given an IV PPI and admitted to Hospital Medicine for further management.    * No surgery found *      Hospital Course:   8/27 - no further N&V.  Tolerating liquids,  Will give solid food now.  If he does well, may be able to discharge pt.  Patient's symptoms improved with opoid hydrocodone.  He is now able to eat and tolerate solid food. Suspect iatrogenic opioid withdrawal as to the cause of this admission for N&V.  H/H stable. Will wean  Over 2 days.   Will set  up outpatient gastric emptying study . Continue home protonix.      Consults:   Consults (From admission, onward)        Status Ordering Provider     Inpatient consult to Gastroenterology  Once     Provider:  (Not yet assigned)    Completed NEVILLE STILES     Inpatient consult to Nephrology  Once     Provider:  (Not yet assigned)    Completed NEVILLE STILES     IP consult to case management  Once     Provider:  (Not yet assigned)    Acknowledged JONO KNIGHT          * Coffee ground emesis  Grade 2 esophagitis, Hb 9.9 and stable    Recommend IV PPI bid (can be transitioned to PO bid when able to tolerate for 8-12 weeks)  - Scheduled zofran inpatient. If no relief, consider IV reglan  - Trial of sucralfate  - Advance diet as tolerated. Recommend small, frequent meals, low fat  - No need for EGD at this time as most recent one from 8/2 with only grade A esophagitis and no evidence of GI bleeding  - Needs outpatient gastric emptying study    8/27 - Hb 10.2       Erosive gastritis  · Hgb 12 on admit, baseline around 10  · S/p Protonix 80mg IV x 1 in the ED.  Start Protonix 40mg IV BID  · Continue Carafate  · GI consulted, appreciate assistance  · NPO at midnight for possible EGD - last EGD 8/2 at [facility] with gastritis      Non-intractable vomiting with nausea  8/27 - resolved  Wean opioid at discharge.  Hydrocodone 10 -> 5 a 8 hours.  PRN reglan and zofran.at home. He is high risk for gastroaresis nd should not be on opioids.     GERD with esophagitis  As above      Chronic upper GI bleeding  stable      ESRD on hemodialysis  · HD MWF  · Nephro consulted  · Continue Renvela 1600mg PO TID      Chronic blood loss anemia  stable      History of GI bleed        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, without long-term current use of insulin  Recent Labs     08/26/19  0847 08/26/19  1215 08/26/19  1821   POCTGLUCOSE 87 99 92           Renovascular hypertension  100/53        Final Active  Diagnoses:    Diagnosis Date Noted POA    PRINCIPAL PROBLEM:  Coffee ground emesis [K92.0] 08/25/2019 Yes    Erosive gastritis [K29.60] 05/23/2018 Yes    Non-intractable vomiting with nausea [R11.2] 10/12/2014 Yes    GERD with esophagitis [K21.0]  Yes    Chronic upper GI bleeding [K92.2] 05/21/2018 Yes    ESRD on hemodialysis [N18.6, Z99.2] 02/07/2013 Not Applicable     Chronic    Chronic blood loss anemia [D50.0] 08/25/2019 Yes    History of GI bleed [Z87.19] 05/22/2018 Not Applicable    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, without long-term current use of insulin [E11.29] 05/21/2018 Yes     Chronic    Renovascular hypertension [I15.0] 03/16/2018 Yes     Chronic    Peripheral vascular disease in diabetes mellitus [E11.51] 07/18/2013 Yes     Chronic      Problems Resolved During this Admission:       Discharged Condition: good    Disposition: Home or Self Care    Follow Up:    Patient Instructions:   No discharge procedures on file.    Significant Diagnostic Studies: Labs:   CMP   Recent Labs   Lab 08/25/19  1956 08/26/19  0456 08/27/19  0357    139 140   K 4.0 4.0 4.9    103 101   CO2 20* 19* 19*   * 83 117*   BUN 30* 36* 25*   CREATININE 11.3* 12.2* 7.9*   CALCIUM 9.8 9.2 9.7   PROT 8.8* 8.4 8.6*   ALBUMIN 3.6 3.4* 3.5   BILITOT 0.4 0.4 0.4   ALKPHOS 242* 225* 223*   AST 22 20 30   ALT 11 10 11   ANIONGAP 19* 17* 20*   ESTGFRAFRICA 5.2* 4.7* 8.0*   EGFRNONAA 4.5* 4.1* 6.9*    and CBC   Recent Labs   Lab 08/26/19  1526 08/27/19  0123 08/27/19  0357   WBC 8.20 7.52 8.62   HGB 10.1* 9.7* 10.2*   HCT 31.1* 30.0* 30.0*    177 167       Pending Diagnostic Studies:     None         Medications:  Reconciled Home Medications:      Medication List      START taking these medications    acetaminophen 325 MG tablet  Commonly known as:  TYLENOL  Take 2 tablets (650 mg total) by mouth every 8 (eight) hours as needed.     HYDROcodone-acetaminophen 5-325 mg per  tablet  Commonly known as:  NORCO  Take 1 tablet by mouth every 4 (four) hours as needed. Need to wean and discontinue.  High risk for causing recurrent N&V, gastroparesis.        CHANGE how you take these medications    sevelamer carbonate 800 mg Tab  Commonly known as:  RENVELA  Take 2 tablets (1,600 mg total) by mouth 3 (three) times daily with meals.  What changed:  See the new instructions.        CONTINUE taking these medications    carvedilol 25 MG tablet  Commonly known as:  COREG  Take 1 tablet (25 mg total) by mouth 2 (two) times daily with meals.     metoclopramide HCl 10 MG tablet  Commonly known as:  REGLAN  Take 0.5 tablets (5 mg total) by mouth 3 (three) times daily before meals. for 5 days     ondansetron 8 MG tablet  Commonly known as:  ZOFRAN  Take 1 tablet (8 mg total) by mouth every 8 (eight) hours as needed for Nausea.     pantoprazole 40 MG tablet  Commonly known as:  PROTONIX  Take 1 tablet (40 mg total) by mouth 2 (two) times daily before meals.     RENAPLEX-D 800 mcg-12.5 mg -2,000 unit Tab  Generic drug:  vit B,C-FA-zinc-selen-vit D3-E  Take 1 tablet by mouth once daily.     sucralfate 100 mg/mL suspension  Commonly known as:  CARAFATE  Take 5 mLs (500 mg total) by mouth 2 (two) times daily.        STOP taking these medications    atorvastatin 80 MG tablet  Commonly known as:  LIPITOR     dicyclomine 10 MG capsule  Commonly known as:  BENTYL            Indwelling Lines/Drains at time of discharge:   Lines/Drains/Airways     Drain                 Hemodialysis AV Fistula Right upper arm -- days         Hemodialysis AV Fistula Right upper arm -- days                Time spent on the discharge of patient: 35  minutes  Patient was seen and examined on the date of discharge and determined to be suitable for discharge.         Renea Gallegos MD  Department of Hospital Medicine  [facility]

## 2019-08-27 NOTE — ASSESSMENT & PLAN NOTE
8/27 - resolved  Wean opioid at discharge.  Hydrocodone 10 -> 5 a 8 hours.  PRN reglan and zofran.at home. He is high risk for gastroaresis nd should not be on opioids.

## 2019-08-28 NOTE — PLAN OF CARE
Patient discharged home with no needs.  Patient's transportation home provided by his sister via personal vehicle.    Future Appointments   Date Time Provider Department Center   9/5/2019 10:00 AM Yvette Zelaya NP [facility]   9/26/2019  2:00 PM Fabio Jones MD [facility]        08/28/19 0758   Final Note   Assessment Type Final Discharge Note   Anticipated Discharge Disposition Home   What phone number can be called within the next 1-3 days to see how you are doing after discharge? [phone]   Hospital Follow Up  Appt(s) scheduled? Yes   Discharge plans and expectations educations in teach back method with documentation complete? Yes   Right Care Referral Info   Post Acute Recommendation No Care

## 2019-09-02 PROBLEM — N19 UREMIA: Status: ACTIVE | Noted: 2019-01-01

## 2019-09-02 PROBLEM — R10.10 PAIN OF UPPER ABDOMEN: Status: ACTIVE | Noted: 2018-03-18

## 2019-09-02 NOTE — SUBJECTIVE & OBJECTIVE
Past Medical History:   Diagnosis Date    Amputation stump pain 9/10/2013    Aspiration pneumonia 7/27/2015    Asterixis 11/8/2016    C. difficile colitis 8/7/2015    Cholelithiasis without obstruction 8/25/2015    Chronic diastolic heart failure     2-23-17   1 - Low normal to mildly depressed left ventricular systolic function (EF 50-55%).    2 - Right ventricular enlargement with mildly depressed systolic function.    3 - Left ventricular diastolic dysfunction.    4 - Right atrial enlargement.    5 - Severe tricuspid regurgitation.    6 - Pulmonary hypertension. The estimated PA systolic pressure is 86 mmHg.    7 - Increased central venous pressure.    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    Metabolic acidosis, IAG, reduced excretion of inorganic acids     Myoclonic jerking 9/20/2016    Noncompliance with medication regimen 12/4/2018    Secondary hyperparathyroidism (of renal origin)     Secondary pulmonary hypertension 3/23/2017    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    TB lung, latent 08/25/2015    Negative Quantiferon Gold 3-23-17       Past Surgical History:   Procedure Laterality Date    AMPUTATION, BELOW KNEE Left 12/18/2013    Performed by Elgin Houston MD at [facility] OR 1ST FLR    COLONOSCOPY      COLONOSCOPY N/A 4/4/2017    Performed by Walker Stern MD at [facility] ENDO (2ND FLR)    EGD (ESOPHAGOGASTRODUODENOSCOPY) N/A 3/7/2019    Performed by  Man Galicia MD at [facility] ENDO (2ND FLR)    EGD (ESOPHAGOGASTRODUODENOSCOPY) N/A 6/12/2018    Performed by Man Galicia MD at [facility] ENDO (2ND FLR)    ESOPHAGOGASTRODUODENOSCOPY (EGD) N/A 5/22/2018    Performed by Ke Sparks MD at [facility] ENDO (2ND FLR)    ESOPHAGOGASTRODUODENOSCOPY (EGD) N/A 3/16/2018    Performed by Kevin De La Paz MD at [facility] ENDO (2ND FLR)    ESOPHAGOGASTRODUODENOSCOPY (EGD) N/A 3/8/2018    Performed by Man Galicia MD at [facility] ENDO (2ND FLR)    ESOPHAGOGASTRODUODENOSCOPY (EGD) N/A 4/4/2017    Performed by Walker Stern MD at [facility] ENDO (2ND FLR)    ESOPHAGOGASTRODUODENOSCOPY (EGD) N/A 10/17/2014    Performed by Man Galicia MD at [facility] (2ND FLR)    FISTULOGRAM Right 9/18/2014    Performed by Grayson Hubbard MD at [facility] CATH LAB    FOOT AMPUTATION THROUGH METATARSAL      left foot    LEG AMPUTATION THROUGH KNEE  12/18/2013    left BKA    R AVF  9/12/12    UPPER GASTROINTESTINAL ENDOSCOPY         Review of patient's allergies indicates:   Allergen Reactions    Fosrenol [lanthanum] Nausea And Vomiting     Nausea and vomiting     Current Facility-Administered Medications   Medication Frequency    0.9%  NaCl infusion Once    0.9%  NaCl infusion PRN    acetaminophen tablet 650 mg Q8H PRN    atorvastatin tablet 80 mg QHS    carvedilol tablet 25 mg BID WM    dextrose 10% (D10W) Bolus PRN    dextrose 10% (D10W) Bolus PRN    glucagon (human recombinant) injection 1 mg PRN    glucose chewable tablet 16 g PRN    glucose chewable tablet 24 g PRN    HYDROcodone-acetaminophen 5-325 mg per tablet 1 tablet Q4H PRN    metoclopramide HCl tablet 5 mg TID AC    ondansetron injection 4 mg Q6H    pantoprazole EC tablet 40 mg BID AC    prochlorperazine injection Soln 10 mg Q6H PRN    senna-docusate 8.6-50 mg per tablet 1 tablet BID PRN    sevelamer carbonate tablet 1,600 mg TID WM    sodium chloride 0.9% flush 5 mL PRN    sucralfate 100 mg/mL suspension 0.5 g BID      Current Outpatient Medications   Medication    acetaminophen (TYLENOL) 325 MG tablet    carvedilol (COREG) 25 MG tablet    HYDROcodone-acetaminophen (NORCO) 5-325 mg per tablet    ondansetron (ZOFRAN) 8 MG tablet    pantoprazole (PROTONIX) 40 MG tablet    RENAPLEX-D 800 mcg-12.5 mg -2,000 unit Tab    sevelamer carbonate (RENVELA) 800 mg Tab    sucralfate (CARAFATE) 100 mg/mL suspension     Family History     Problem Relation (Age of Onset)    Alcohol abuse Maternal Grandmother    Diabetes Brother, Maternal Grandfather    Early death Mother    Heart disease Father    Hyperlipidemia Father    Hypertension Father, Sister    Kidney disease Father        Tobacco Use    Smoking status: Former Smoker     Packs/day: 1.00     Years: 10.00     Pack years: 10.00    Smokeless tobacco: Never Used   Substance and Sexual Activity    Alcohol use: No    Drug use: No    Sexual activity: Yes     Partners: Female     Birth control/protection: None     Review of Systems   Constitutional: Negative for chills and fever.   HENT: Negative for congestion and rhinorrhea.    Eyes: Negative for pain and visual disturbance.   Respiratory: Negative for cough and shortness of breath.    Cardiovascular: Negative for chest pain, palpitations and leg swelling.   Gastrointestinal: Positive for abdominal pain, constipation, nausea and vomiting. Negative for diarrhea.   Endocrine: Negative for polydipsia and polyuria.   Genitourinary: Negative for dysuria and hematuria.   Musculoskeletal: Negative for arthralgias and myalgias.   Skin: Negative for rash and wound.   Neurological: Negative for dizziness, weakness and headaches.   Psychiatric/Behavioral: Negative for agitation and behavioral problems.     Objective:     Vital Signs (Most Recent):  Temp: 98.8 °F (37.1 °C) (09/02/19 1315)  Pulse: 98 (09/02/19 1345)  Resp: 13 (09/02/19 1231)  BP: 121/65 (09/02/19 1345)  SpO2: 100 % (09/02/19 0639)  O2 Device (Oxygen Therapy): room air  (09/02/19 1315) Vital Signs (24h Range):  Temp:  [97.4 °F (36.3 °C)-98.8 °F (37.1 °C)] 98.8 °F (37.1 °C)  Pulse:  [] 98  Resp:  [12-20] 13  SpO2:  [100 %] 100 %  BP: ()/(54-76) 121/65        Body mass index is 21.31 kg/m².  Body surface area is 1.67 meters squared.    No intake/output data recorded.    Physical Exam   Constitutional: He is oriented to person, place, and time. He appears well-developed and well-nourished. No distress.   HENT:   Head: Normocephalic and atraumatic.   Eyes: Pupils are equal, round, and reactive to light. EOM are normal.   Neck: Normal range of motion. Neck supple.   Cardiovascular: Normal rate, regular rhythm and intact distal pulses.   No murmur heard.  Pulmonary/Chest: Effort normal and breath sounds normal. No respiratory distress. He has no wheezes. He has no rales.   Abdominal: Soft. Bowel sounds are normal. He exhibits no distension. There is no tenderness.   Musculoskeletal: He exhibits no edema or deformity.   L BKA  R AVF +thrill +bruit   Neurological: He is alert and oriented to person, place, and time.   Skin: Skin is warm and dry.   Psychiatric: He has a normal mood and affect. His behavior is normal.       Significant Labs:  CBC:   Recent Labs   Lab 09/02/19  0731   WBC 9.58   RBC 2.63*   HGB 8.5*   HCT 25.7*      MCV 98   MCH 32.3*   MCHC 33.1     CMP:   Recent Labs   Lab 09/02/19  0731   *   CALCIUM 9.5   ALBUMIN 3.2*   PROT 7.6      K 2.7*   CO2 34*   CL 90*   BUN 45*   CREATININE 11.3*   ALKPHOS 198*   ALT 9*   AST 17   BILITOT 0.4

## 2019-09-02 NOTE — H&P
Hospital Medicine  History and Physical Exam     Team: Networked reference to record PCT  Mariposa Dalton MD  Admit Date: 9/2/2019  JUAN   Principal Problem:  Abdominal pain, Weakness, fatigue   Patient information was obtained from patient and ER records.   Primary Care Physician: Yvette Zelaya NP  Code status: Full Code    HPI: Vaughn Retana is a 54 y.o. male with PMH of ESRD (MWF), history of GI bleed/ erosive esophagitis, HTN , CVA/ intracerebral hemorrhage,  HFpEF, who [place] [facility] [place] with a chief complaint of abdominal pain. Patient reports his abdominal pain started 3 days ago without any inciting events.  The pain is located diffusely across his abdomen, is nonradiating.  It is sharp and cramping in quality, 8/10 in intensity.  He does not associated the pain with any aggravating or alleviating factors.  Patient does have associated nausea and vomiting for the past 3 days.  He reports having 3 episodes of nonbloody, nonbilious emesis over the last 3 days.  He has antiemetics prescribed at home, which he took yesterday but did not provide any relief from his nausea and vomiting.  Patient also complaints of constipation and his last bowel movement was 4 days ago.  Patient denies any melena, hematochezia, fever, chills, malaise, urinary urgency, frequency, dysuria, cough, sputum production, chest pain, shortness of breath.  Patient has been compliant with his dialysis reports last HD session was on Friday 8/30.  He also complains of feeling really weak and fatigued today.    While in the ED, patient is noted to be afebrile, hemodynamically stable, saturating 100% on room air.  No leukocytosis, WBC of 9.5.  H&H chronically low today 8.5/25.7.  Noted to be hypokalemic with potassium of 2.7.  BUN/creatinine of 45/11.3.  Alk-phos elevated to 198.  Troponin elevated to 0.173.  CT chest/abdomen done in the ED shows no acute findings, no evidence of aortic aneurysm or dissection.  Cholelithiasis  without evidence of cholecystitis.  CXR nonacute.  Patient is due for his dialysis session today.  Nephrology was consulted for ongoing HD.  Patient admitted to Hospital Medicine for further management.    Past Medical History: Patient has a past medical history of Amputation stump pain (9/10/2013), Aspiration pneumonia (7/27/2015), Asterixis (11/8/2016), C. 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    Past Surgical History: Patient has a past surgical history that includes R AVF (9/12/12); Leg amputation through knee (12/18/2013); Foot amputation through metatarsal; Colonoscopy; Colonoscopy (N/A, 4/4/2017); Esophagogastroduodenoscopy (N/A, 6/12/2018); Upper gastrointestinal endoscopy; and Esophagogastroduodenoscopy (N/A, 3/7/2019).    Social History: Patient reports that he has quit smoking. He has a 10.00 pack-year smoking history. He has never used smokeless tobacco. He reports that he does not drink alcohol or use drugs.    Family History: family history includes Alcohol abuse in his maternal grandmother; Diabetes in his  brother and maternal grandfather; Early death in his mother; Heart disease in his father; Hyperlipidemia in his father; Hypertension in his father and sister; Kidney disease in his father.    Medications: reviewed     Allergies: Patient is allergic to fosrenol [lanthanum].    Review of Systems: (BOLDED POSITIVE) (REMAINDER NEGATIVE)     General: fever, chills, night sweats, weakness, fatigue    Eyes/Head: vision changes, headache, dizziness   ENT: tinnitus, epistaxis, dysphagia  Respiratory: SOB, cough, wheezing, sputum, hemoptysis   CVS: chest pain, edema, syncope, palpitations, MCGILL  GI:nausea, vomiting, diarrhea, constipation, abdominal pain  : dysuria, hematuria, nocturia, incontinence  Neurological: headace, weakness, slurred speech, numbness/tingling   Heme/Lymph: anemia, easy bruising, swollen glands    MSK:arthralgia, gout, back pain, stiffness     Psychiatric: insomnia, stress, depression, anxiety, SI, HI         Physical Exam:     Temp:  [97.4 °F (36.3 °C)]   Pulse:  []   Resp:  [12-20]   BP: ()/(54-70)   SpO2:  [100 %]   Body mass index is 21.31 kg/m².   No intake or output data in the 24 hours ending 09/02/19 1332       General appearance: NAD, cooperative, alert, appears older stated age    Head: Normocephalic, without obvious abnormality, atraumatic     Eyes: conjunctivae/corneas clear. PERRLA, EOM's intact     Throat: Lips, mucosa, and tongue moist and without lesion     Neck: supple, trachea midline, no adenopathy     Lungs: clear to auscultation bilaterally, no wheezes, no crackles, no rales, no rhonchi   Heart: regular rate and rhythm, S1, S2 normal, no murmur, click, rub or gallop    Abdomen: Soft, non-tender, non-distended, normoactive bowel sounds. No masses, no organomegaly     Extremities: atraumatic, no deformities, no cyanosis or edema     Neurologic: Alert and oriented X 3, Normal symmetric reflexes. CN II-XII intact. No focal neurological deficits.     Musculoskeletal: Normal  ROM, normal strength     Skin: No rash, tears, or ecchymosis, capillary refill brisk       Labs:  Recent Results (from the past 24 hour(s))   CBC auto differential    Collection Time: 09/02/19  7:31 AM   Result Value Ref Range    WBC 9.58 3.90 - 12.70 K/uL    RBC 2.63 (L) 4.60 - 6.20 M/uL    Hemoglobin 8.5 (L) 14.0 - 18.0 g/dL    Hematocrit 25.7 (L) 40.0 - 54.0 %    Mean Corpuscular Volume 98 82 - 98 fL    Mean Corpuscular Hemoglobin 32.3 (H) 27.0 - 31.0 pg    Mean Corpuscular Hemoglobin Conc 33.1 32.0 - 36.0 g/dL    RDW 15.4 (H) 11.5 - 14.5 %    Platelets 209 150 - 350 K/uL    MPV 11.2 9.2 - 12.9 fL    Immature Granulocytes 0.3 0.0 - 0.5 %    Gran # (ANC) 6.8 1.8 - 7.7 K/uL    Immature Grans (Abs) 0.03 0.00 - 0.04 K/uL    Lymph # 1.8 1.0 - 4.8 K/uL    Mono # 0.8 0.3 - 1.0 K/uL    Eos # 0.2 0.0 - 0.5 K/uL    Baso # 0.04 0.00 - 0.20 K/uL    nRBC 0 0 /100 WBC    Gran% 70.5 38.0 - 73.0 %    Lymph% 18.7 18.0 - 48.0 %    Mono% 8.4 4.0 - 15.0 %    Eosinophil% 1.7 0.0 - 8.0 %    Basophil% 0.4 0.0 - 1.9 %    Differential Method Automated    Comprehensive metabolic panel    Collection Time: 09/02/19  7:31 AM   Result Value Ref Range    Sodium 140 136 - 145 mmol/L    Potassium 2.7 (LL) 3.5 - 5.1 mmol/L    Chloride 90 (L) 95 - 110 mmol/L    CO2 34 (H) 23 - 29 mmol/L    Glucose 126 (H) 70 - 110 mg/dL    BUN, Bld 45 (H) 6 - 20 mg/dL    Creatinine 11.3 (H) 0.5 - 1.4 mg/dL    Calcium 9.5 8.7 - 10.5 mg/dL    Total Protein 7.6 6.0 - 8.4 g/dL    Albumin 3.2 (L) 3.5 - 5.2 g/dL    Total Bilirubin 0.4 0.1 - 1.0 mg/dL    Alkaline Phosphatase 198 (H) 55 - 135 U/L    AST 17 10 - 40 U/L    ALT 9 (L) 10 - 44 U/L    Anion Gap 16 8 - 16 mmol/L    eGFR if African American 5.2 (A) >60 mL/min/1.73 m^2    eGFR if non African American 4.5 (A) >60 mL/min/1.73 m^2   Lipase    Collection Time: 09/02/19  7:31 AM   Result Value Ref Range    Lipase 43 4 - 60 U/L   Lactic acid, plasma    Collection Time: 09/02/19  7:31 AM   Result Value Ref Range     Lactate (Lactic Acid) 2.0 0.5 - 2.2 mmol/L   Magnesium    Collection Time: 09/02/19  7:31 AM   Result Value Ref Range    Magnesium 2.3 1.6 - 2.6 mg/dL   Troponin I    Collection Time: 09/02/19  7:31 AM   Result Value Ref Range    Troponin I 0.173 (H) 0.000 - 0.026 ng/mL   Brain natriuretic peptide    Collection Time: 09/02/19  7:31 AM   Result Value Ref Range    BNP 81 0 - 99 pg/mL   Type & Screen    Collection Time: 09/02/19  8:01 AM   Result Value Ref Range    Group & Rh A POS     Indirect Marci NEG        Hemoglobin A1C   Date Value Ref Range Status   06/22/2019 4.7 4.0 - 5.6 % Final     Comment:     ADA Screening Guidelines:  5.7-6.4%  Consistent with prediabetes  >or=6.5%  Consistent with diabetes  High levels of fetal hemoglobin interfere with the HbA1C  assay. Heterozygous hemoglobin variants (HbS, HgC, etc)do  not significantly interfere with this assay.   However, presence of multiple variants may affect accuracy.     06/22/2019 4.7 4.0 - 5.6 % Final     Comment:     ADA Screening Guidelines:  5.7-6.4%  Consistent with prediabetes  >or=6.5%  Consistent with diabetes  High levels of fetal hemoglobin interfere with the HbA1C  assay. Heterozygous hemoglobin variants (HbS, HgC, etc)do  not significantly interfere with this assay.   However, presence of multiple variants may affect accuracy.     12/04/2018 <4.0 (A) 4.0 - 5.6 % Final     Comment:     ADA Screening Guidelines:  5.7-6.4%  Consistent with prediabetes  >or=6.5%  Consistent with diabetes  High levels of fetal hemoglobin interfere with the HbA1C  assay. Heterozygous hemoglobin variants (HbS, HgC, etc)do  not significantly interfere with this assay.   However, presence of multiple variants may affect accuracy.         Recent Labs   Lab 08/26/19  1821   POCTGLUCOSE 92       Active Hospital Problems    Diagnosis  POA    Uremia [N19]  Yes    GERD with esophagitis [K21.0]  Yes    Renovascular hypertension [I15.0]  Yes     Chronic    Peripheral vascular  disease in diabetes mellitus [E11.51]  Yes     Chronic    Dyslipidemia [E78.5]  Yes     Chronic    ESRD on hemodialysis [N18.6, Z99.2]  Not Applicable     Chronic     MWF at [facility]      Anemia in chronic kidney disease [N18.9, D63.1]  Yes     Chronic    Secondary hyperparathyroidism of renal origin [N25.81]  Yes     Chronic      Resolved Hospital Problems   No resolved problems to display.       Assessment and Plan:    Abdominal pain:  Nausea with Vomiting:  -Pt has had multiple episodes of NB NB vomiting for the last 3 days  -WBC, lactic acid, lipase all within normal limits  -CT abdomen nonacute    -given gentle hydration  -zofran IV available PRN, IV compazine as second choice   -advance diet as tolerated  -monitor electrolytes, replete as necessary   -consider GI consult for possible endoscopic tx     Hypokalemia:  -potassium of 2.7 today  -replete and monitor    Elevated troponin:  -EKG on admission shows no acute ST changes  -Troponin = 0.173  -will continue to cycle troponin and continue until downtrend is noted  -likely secondary to demand ischemia, worsened by ESRD  -Maintain K > 4, Mag > 2 and Ca WNL to decrease arrhythmogenic potential  -Monitor on telemetry     ESRD on HD:  -consult Nephrology for ongoing HD while admitted inpatient  -Continue to monitor renal function with daily labs   -Avoid nephrotoxins  -Renally dose all medications   -Monitor events that may lead to decreased renal perfusion (hypovolemia, hypotension, sepsis).   -Monitor urine output to assure that no obstruction precipitates worsening in GFR.  -continue PTA sevelamer    Hypertension:  -stable  -Continue PTA  Coreg  -monitor vitals q4h  -SBP goal of <160 in hospital    Diabetes Mellitus:  -Last HbA1c:  4.7 on 06/22/2019  -SSI with accuchecks qACHS  -BG goal: Preprandial blood glucose target <140 mg/dL, Random glucoses <180 mg/dL  -ADA diet    Gastroparesis:  -continue Reglan    Hyperlipidemia:  -continue PTA atorvastatin 80  mg     GERD:  -continue PPI, sucralfate    DVT PPx:  SCDs    Mariposa Dalton MD  Hospital Medicine Staff  [phone] pager

## 2019-09-02 NOTE — PLAN OF CARE
Pt arrives to the unit, educated the patient on room orientation, fall risk, and safety. Admission & assessment completed @ this time. Pt AAOx4, denies any SOB, chest pain. Pt currently on RA. Pt nauseated and dry heaving. Antiemetic administered. Bed alarm on, wheels locked, call light within reach. Will continue to monitor.

## 2019-09-02 NOTE — ED PROVIDER NOTES
Encounter Date: 9/2/2019       History     Chief Complaint   Patient presents with    Abdominal Pain     c/o abdominal pain, chest pain, n/v x 3 days. last dialyzed friday. due today.      HPI   53 Y/O anuric M with history of end-stage renal disease on hemodialysis Monday Wednesday Friday presents with sister complaining of severe generalized abdominal, chest and back pain reported as sharp with associated nausea and multiple bouts of nonbloody nonbilious emesis.  No reported numbness or weakness to bilateral upper lower extremities. Denies any headache, visual changes, dizziness or any visual complaints.     Review of patient's allergies indicates:   Allergen Reactions    Fosrenol [lanthanum] Nausea And Vomiting     Nausea and vomiting     Past Medical History:   Diagnosis Date    Amputation stump pain 9/10/2013    Aspiration pneumonia 7/27/2015    Asterixis 11/8/2016    C. difficile colitis 8/7/2015    Cholelithiasis without obstruction 8/25/2015    Chronic diastolic heart failure     2-23-17   1 - Low normal to mildly depressed left ventricular systolic function (EF 50-55%).    2 - Right ventricular enlargement with mildly depressed systolic function.    3 - Left ventricular diastolic dysfunction.    4 - Right atrial enlargement.    5 - Severe tricuspid regurgitation.    6 - Pulmonary hypertension. The estimated PA systolic pressure is 86 mmHg.    7 - Increased central venous pressure.     Chronic low back pain 12/1/2015    Closed head injury 9/8/2016    ESRD on hemodialysis 2/7/2013    MWF at [facility]    GERD (gastroesophageal reflux disease)     HCV antibody positive     Normal LFT as of 3/2017    Hemiparesis affecting left side as late effect of stroke 11/08/2016    History of Intracerebral Hemorrhage: L BG 5/2013; R BG 9/2016; R BG 11/2016; L caudate head 2/2017 11/2/2016    Hypertension     left basal ganglia ICH 5/2013 11/2/2016    Left Caudate Head ICH 2/22/2017 2/24/2017     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 8/1/2015    Metabolic acidosis, IAG, reduced excretion of inorganic acids     Myoclonic jerking 9/20/2016    Noncompliance with medication regimen 12/4/2018    Secondary hyperparathyroidism (of renal origin)     Secondary pulmonary hypertension 3/23/2017    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    TB lung, latent 08/25/2015    Negative Quantiferon Gold 3-23-17     Past Surgical History:   Procedure Laterality Date    AMPUTATION, BELOW KNEE Left 12/18/2013    Performed by Elgin Houston MD at [facility] OR 1ST FLR    COLONOSCOPY      COLONOSCOPY N/A 4/4/2017    Performed by Walker Stern MD at [facility] ENDO (2ND FLR)    EGD (ESOPHAGOGASTRODUODENOSCOPY) N/A 3/7/2019    Performed by Man Galicia MD at [facility] ENDO (2ND FLR)    EGD (ESOPHAGOGASTRODUODENOSCOPY) N/A 6/12/2018    Performed by Man Galicia MD at [facility] ENDO (2ND FLR)    ESOPHAGOGASTRODUODENOSCOPY (EGD) N/A 5/22/2018    Performed by Ke Sparks MD at [facility] ENDO (2ND FLR)    ESOPHAGOGASTRODUODENOSCOPY (EGD) N/A 3/16/2018    Performed by Kevin De La Paz MD at [facility] ENDO (2ND FLR)    ESOPHAGOGASTRODUODENOSCOPY (EGD) N/A 3/8/2018    Performed by Man Galicia MD at [facility] ENDO (2ND FLR)    ESOPHAGOGASTRODUODENOSCOPY (EGD) N/A 4/4/2017    Performed by Walker Stern MD at [facility] ENDO (2ND FLR)    ESOPHAGOGASTRODUODENOSCOPY (EGD) N/A 10/17/2014    Performed by Man Galicia MD at [facility] ENDO (2ND FLR)    FISTULOGRAM Right 9/18/2014    Performed by Grayson Hubbard MD at [facility] CATH LAB    FOOT AMPUTATION THROUGH METATARSAL      left foot    LEG AMPUTATION THROUGH KNEE  12/18/2013    left BKA    R AVF  9/12/12    UPPER GASTROINTESTINAL ENDOSCOPY       Family History   Problem Relation Age of Onset    Early death Mother     Kidney disease Father     Hypertension Father     Heart disease Father     Hyperlipidemia Father     Diabetes Brother     Alcohol abuse Maternal Grandmother      Diabetes Maternal Grandfather     Hypertension Sister     Melanoma Neg Hx      Social History     Tobacco Use    Smoking status: Former Smoker     Packs/day: 1.00     Years: 10.00     Pack years: 10.00    Smokeless tobacco: Never Used   Substance Use Topics    Alcohol use: No    Drug use: No     Review of Systems  CONST: +Malaise, Nausea and Emesis with No fever, chills, weight change.  HEENT: No headache, blurry vision/change in vision, sore throat, ear pain, eye pain, otorrhea, rhinorrhea, tooth pain, swelling, or voice changes.  NECK: No pain, masses, trauma, or redness.  HEART: No pain, palpitations, diaphoresis, nausea, or vomiting  LUNG: No SOB, cough, orthopnea, MCGILL or other complaints.  ABDOMEN: +generalized pain, nausea, and vomiting, but diarrhea, constipation, or flank pain. No black or bloody stool reported.  : No discharge, dysuria, lesions, rashes, masses, sores  EXTREMITIES: +Left BKA. FROM with No swelling, redness, injuries/trauma, lesions, sores, weakness, numbness, or tingling  NEURO: No dizziness, weakness, fatigue, tremors, headache, change in vision or disturbances of balance or coordination  SKIN: No lesions, rashes, trauma or other complaints    Physical Exam     Initial Vitals   BP Pulse Resp Temp SpO2   09/02/19 0622 09/02/19 0622 09/02/19 0622 09/02/19 0622 09/02/19 0637   (!) 92/54 101 20 97.4 °F (36.3 °C) 100 %      MAP       --                Physical Exam  PHYSICAL EXAM:  GENERAL: Cooperative; Well-appearing and Non-Toxic; thin; +Mild to moderate distress secondary to nausea, tachypnea, tachycardia and generalized malaise.  HEENT: AT/NC; PERRL, EOMI; speaking full sentences with no slurring of speech or drooling/inability to tolerate oral secretions.  NECK: Supple, FROM with no meningismus, no accessory muscle use. No JVD or Carotid Bruits B/L.  HEART: +Hypotension and Tachycardia with Regular rhythm, no M/G/T.  LUNGS: +Tachypnea, but No Retractions, and CTA B/L with no  W/R/R.  ABDOMEN: +BS, Soft, ND, NTTP. No rigidity. No guarding. NEG Wise's, Rovsing's, or McBurney's point tenderness.  BACK: Atraumatic, No midline TTP to C/T/LS spine; No CVA tenderness B/L. SLRT NEG.  EXTREMITIES: +LEFT BKA; FROM. Strength 5/5. Symmetrical Sensorium and with no deficits. Soft Comparments.  SKIN: Warm, Dry, No Skin Tears or Rashes.  VASCULAR: 2+ pulses Prox/Dist & Symmetrical with No delay.  NEUROLOGIC: AAOx3; No Receptive or Expressive Aphasia; +Delay to response, but Answering Questions Appropriately; Recent & Remote Memory Intact; No Visual or Tactile Agnosia to B/L; CN/PN Intact; Strength 5/5 and Sens Symmetrical to UE & LE; No Ataxia, NEG Romberg's, and Grossly Intact FTN, HTS & Rapid Alt Hand Motions.     ED Course   Procedures  Labs Reviewed - No data to display  EKG Readings: (Independently Interpreted)   Sinus rhythm at a rate of 97 beats per minute with normal ventricular axis; MA/QRS intervals within normal limits;+ QTC prolongation of 530 millisecond; incomplete right bundle branch block appreciated; no STEMI.  ____________________  Cristobal Fuller MD, [facility]  Emergency Medicine Staff  8:43 AM 9/2/2019         Imaging Results    None       X-Rays:   Independently Interpreted Readings:   Other Readings:  PA and lateral film appreciated with no pneumothorax; no pleural effusions or lobar consolidations.  ____________________  Cristobal Fuller MD, [facility]  Emergency Medicine Staff  8:09 AM 9/2/2019      Medical Decision Making:   History:   Old Medical Records: I decided to obtain old medical records.  Initial Assessment:   Normocardiac and non hypoxic hypotensive male presents with sister reporting 1-2 days of acute back and abdominal pain with anorexia and multiple bouts of dry heaving and emesis.  On my initial evaluation, patient is on the toilet reporting having the urge to defecate with no ability to do so.  RN and myself assisted him to his bed revealing a generally altered and hypotensive  male that is awake and oriented to person time and place and with no focal deficits, but slow to respond and reporting generalized weakness. No drooling, muffled voice or inability tolerates oral secretions indicating no airway instability; lack of accessory muscle use, retractions, hypoxia despite slight tachypnea decreases respiratory failure at this time warranting endotracheal intubation or noninvasive ventilation.  Right upper extremity fistula with good thrill.  Currently only reporting of nausea and back/abdominal pain. Will obtain EKG given his history of anuric end-stage renal disease to assure no hypokalemic state warranting shifting and emergent hemodialysis.  Abdomen is is flat, nondistended, with no objective tenderness to palpation or appreciated involuntary guarding. Given his extensive history of peripheral vascular disease, acute back and abdominal pain despite good femoral and popliteal artery pulses with no delay will obtain ultrasound to assure no abdominal aortic aneurysm and likely obtain CT angio to assure no aortic dissection as the etiology of his acute abdominal and back pain. Will closely monitor critically ill patient while in the emergency department until disposition.  ____________________  Cristobal Fuller MD, [facility]  Emergency Medicine Staff  7:23 AM 9/2/2019    F/U:  Patient reports feeling much better.  No further nausea or emesis in the ED since initial evaluation.  He is awake and alert and answering questions appropriately with no focal deficits.  No airway or respiratory instability appreciated. Abdomen continues soft, nondistended none tenderness to palpation of all quadrants.  Awaiting CT results, but will arrange for his hemodialysis today given his uremic state and his inability to reach is hemodialysis appointment today.  ____________________  Cristobal Fuller MD, [facility]  Emergency Medicine Staff  9:55 AM 9/2/2019    F/U Spoke with Nephrology Fellow, who will arrange  HD.  ____________________  Cristobal Fuller MD, [facility]  Emergency Medicine Staff  11:56 AM 9/2/2019    Independently Interpreted Test(s):   I have ordered and independently interpreted X-rays - see prior notes.  I have ordered and independently interpreted EKG Reading(s) - see prior notes  Clinical Tests:   Lab Tests: Ordered  Radiological Study: Ordered  Medical Tests: Ordered                      Clinical Impression:       ICD-10-CM ICD-9-CM   1. Uremia N19 586   2. Abdominal pain R10.9 789.00   3. Nausea and vomiting, intractability of vomiting not specified, unspecified vomiting type R11.2 787.01   4. Back pain, unspecified back location, unspecified back pain laterality, unspecified chronicity M54.9 724.5   5. Hypokalemia E87.6 276.8   6. ESRD on hemodialysis N18.6 585.6    Z99.2 V45.11   7. Pain of upper abdomen R10.10 789.09         Disposition:   Disposition: Admitted  Condition: Stable                        Markell Fuller MD  09/02/19 1500

## 2019-09-02 NOTE — HPI
54 yro M with ESRD on HD MWF, HFpEF, hx of GIB, CVA/ intracerebral hemorrhage who presented to the [facility] ED 9/2/19 with abdominal pain and nausea and vomiting for 3 days. Pr reports constipation with last BM 3 days ago. In the ED, symptoms greatly  improved with zofran. Also received 500 cc bolus of LR. CT chest/abdomen done in the showed no acute findings. Nephrology is consulted for management of ESRD and HD as an in-patient.     -Outpatient HD unit: [facility]  -Nephrologist: pt is unsure  -HD tx days: MWF  -HD tx time: 3 1/2hrs  -Last HD tx: Friday 8/30  -HD access: RUE AVF  -HD modality: iHD  -Residual urine: anuric   -EDW: pt is unsure

## 2019-09-02 NOTE — PROGRESS NOTES
HEMODIALYSIS NOTE  Patient evaluated while undergoing hemodialysis indicated for ESRD. Tolerating session with current UFR, no complications. K bath 4.0

## 2019-09-02 NOTE — CONSULTS
[facility]  Nephrology  Consult Note    Patient Name: Vaughn Retana  MRN: [id number]  Admission Date: 9/2/2019  Hospital Length of Stay: 0 days  Attending Provider: Markell Fuller MD   Primary Care Physician: Yvette Zelaya NP  Principal Problem:<principal problem not specified>    Inpatient consult to Nephrology  Consult performed by: Gavi Veras MD  Consult ordered by: Markell Fuller MD        Subjective:     HPI: 54 yro M with ESRD on HD MWF, HFpEF, hx of GIB, CVA/ intracerebral hemorrhage who [place] [facility] [place]2/19 with abdominal pain and nausea and vomiting for 3 days. Pr reports constipation with last BM 3 days ago. In the ED, symptoms greatly  improved with zofran. Also received 500 cc bolus of LR. CT chest/abdomen done in the showed no acute findings. Nephrology is consulted for management of ESRD and HD as an in-patient.     -Outpatient HD unit: [facility]  -Nephrologist: pt is unsure  -HD tx days: MWF  -HD tx time: 3 1/2hrs  -Last HD tx: Friday 8/30  -HD access: RUE AVF  -HD modality: iHD  -Residual urine: anuric   -EDW: pt is unsure      Past Medical History:   Diagnosis Date    Amputation stump pain 9/10/2013    Aspiration pneumonia 7/27/2015    Asterixis 11/8/2016    C. difficile colitis 8/7/2015    Cholelithiasis without obstruction 8/25/2015    Chronic diastolic heart failure     2-23-17   1 - Low normal to mildly depressed left ventricular systolic function (EF 50-55%).    2 - Right ventricular enlargement with mildly depressed systolic function.    3 - Left ventricular diastolic dysfunction.    4 - Right atrial enlargement.    5 - Severe tricuspid regurgitation.    6 - Pulmonary hypertension. The estimated PA systolic pressure is 86 mmHg.    7 - Increased central venous pressure.     Chronic low back pain 12/1/2015    Closed head injury 9/8/2016    ESRD on hemodialysis 2/7/2013    MWF at [facility]    GERD (gastroesophageal reflux  disease)    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    Metabolic acidosis, IAG, reduced excretion of inorganic acids     Myoclonic jerking 9/20/2016    Noncompliance with medication regimen 12/4/2018    Secondary hyperparathyroidism (of renal origin)     Secondary pulmonary hypertension 3/23/2017    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    TB lung, latent 08/25/2015    Negative Quantiferon Gold 3-23-17       Past Surgical History:   Procedure Laterality Date    AMPUTATION, BELOW KNEE Left 12/18/2013    Performed by Elgin Houston MD at [facility] OR 1ST FLR    COLONOSCOPY      COLONOSCOPY N/A 4/4/2017    Performed by Walker Stern MD at [facility] ENDO (2ND FLR)    EGD (ESOPHAGOGASTRODUODENOSCOPY) N/A 3/7/2019    Performed by Man Galicia MD at [facility] ENDO (2ND FLR)    EGD (ESOPHAGOGASTRODUODENOSCOPY) N/A 6/12/2018    Performed by Man Galicia MD at [facility] ENDO (2ND FLR)    ESOPHAGOGASTRODUODENOSCOPY (EGD) N/A 5/22/2018    Performed by Ke Sparks MD at [facility] ENDO (2ND FLR)    ESOPHAGOGASTRODUODENOSCOPY (EGD) N/A 3/16/2018    Performed by Kevin De La Paz MD at [facility] ENDO (2ND FLR)    ESOPHAGOGASTRODUODENOSCOPY (EGD) N/A 3/8/2018    Performed by Man Galicia MD at [facility] ENDO (2ND FLR)    ESOPHAGOGASTRODUODENOSCOPY (EGD) N/A 4/4/2017    Performed by Walker Stern MD at [facility] ENDO (2ND FLR)    ESOPHAGOGASTRODUODENOSCOPY (EGD) N/A 10/17/2014    Performed by Man Galicia MD at [facility] ENDO (2ND FLR)    FISTULOGRAM Right 9/18/2014    Performed by Grayson Hubbard MD at [facility] CATH LAB    FOOT AMPUTATION THROUGH METATARSAL       left foot    LEG AMPUTATION THROUGH KNEE  12/18/2013    left BKA    R AVF  9/12/12    UPPER GASTROINTESTINAL ENDOSCOPY         Review of patient's allergies indicates:   Allergen Reactions    Fosrenol [lanthanum] Nausea And Vomiting     Nausea and vomiting     Current Facility-Administered Medications   Medication Frequency    0.9%  NaCl infusion Once    0.9%  NaCl infusion PRN    acetaminophen tablet 650 mg Q8H PRN    atorvastatin tablet 80 mg QHS    carvedilol tablet 25 mg BID WM    dextrose 10% (D10W) Bolus PRN    dextrose 10% (D10W) Bolus PRN    glucagon (human recombinant) injection 1 mg PRN    glucose chewable tablet 16 g PRN    glucose chewable tablet 24 g PRN    HYDROcodone-acetaminophen 5-325 mg per tablet 1 tablet Q4H PRN    metoclopramide HCl tablet 5 mg TID AC    ondansetron injection 4 mg Q6H    pantoprazole EC tablet 40 mg BID AC    prochlorperazine injection Soln 10 mg Q6H PRN    senna-docusate 8.6-50 mg per tablet 1 tablet BID PRN    sevelamer carbonate tablet 1,600 mg TID WM    sodium chloride 0.9% flush 5 mL PRN    sucralfate 100 mg/mL suspension 0.5 g BID     Current Outpatient Medications   Medication    acetaminophen (TYLENOL) 325 MG tablet    carvedilol (COREG) 25 MG tablet    HYDROcodone-acetaminophen (NORCO) 5-325 mg per tablet    ondansetron (ZOFRAN) 8 MG tablet    pantoprazole (PROTONIX) 40 MG tablet    RENAPLEX-D 800 mcg-12.5 mg -2,000 unit Tab    sevelamer carbonate (RENVELA) 800 mg Tab    sucralfate (CARAFATE) 100 mg/mL suspension     Family History     Problem Relation (Age of Onset)    Alcohol abuse Maternal Grandmother    Diabetes Brother, Maternal Grandfather    Early death Mother    Heart disease Father    Hyperlipidemia Father    Hypertension Father, Sister    Kidney disease Father        Tobacco Use    Smoking status: Former Smoker     Packs/day: 1.00     Years: 10.00     Pack years: 10.00    Smokeless tobacco: Never Used   Substance and Sexual  Activity    Alcohol use: No    Drug use: No    Sexual activity: Yes     Partners: Female     Birth control/protection: None     Review of Systems   Constitutional: Negative for chills and fever.   HENT: Negative for congestion and rhinorrhea.    Eyes: Negative for pain and visual disturbance.   Respiratory: Negative for cough and shortness of breath.    Cardiovascular: Negative for chest pain, palpitations and leg swelling.   Gastrointestinal: Positive for abdominal pain, constipation, nausea and vomiting. Negative for diarrhea.   Endocrine: Negative for polydipsia and polyuria.   Genitourinary: Negative for dysuria and hematuria.   Musculoskeletal: Negative for arthralgias and myalgias.   Skin: Negative for rash and wound.   Neurological: Negative for dizziness, weakness and headaches.   Psychiatric/Behavioral: Negative for agitation and behavioral problems.     Objective:     Vital Signs (Most Recent):  Temp: 98.8 °F (37.1 °C) (09/02/19 1315)  Pulse: 98 (09/02/19 1345)  Resp: 13 (09/02/19 1231)  BP: 121/65 (09/02/19 1345)  SpO2: 100 % (09/02/19 0639)  O2 Device (Oxygen Therapy): room air (09/02/19 1315) Vital Signs (24h Range):  Temp:  [97.4 °F (36.3 °C)-98.8 °F (37.1 °C)] 98.8 °F (37.1 °C)  Pulse:  [] 98  Resp:  [12-20] 13  SpO2:  [100 %] 100 %  BP: ()/(54-76) 121/65        Body mass index is 21.31 kg/m².  Body surface area is 1.67 meters squared.    No intake/output data recorded.    Physical Exam   Constitutional: He is oriented to person, place, and time. He appears well-developed and well-nourished. No distress.   HENT:   Head: Normocephalic and atraumatic.   Eyes: Pupils are equal, round, and reactive to light. EOM are normal.   Neck: Normal range of motion. Neck supple.   Cardiovascular: Normal rate, regular rhythm and intact distal pulses.   No murmur heard.  Pulmonary/Chest: Effort normal and breath sounds normal. No respiratory distress. He has no wheezes. He has no rales.   Abdominal:  Soft. Bowel sounds are normal. He exhibits no distension. There is no tenderness.   Musculoskeletal: He exhibits no edema or deformity.   L BKA  R AVF +thrill +bruit   Neurological: He is alert and oriented to person, place, and time.   Skin: Skin is warm and dry.   Psychiatric: He has a normal mood and affect. His behavior is normal.       Significant Labs:  CBC:   Recent Labs   Lab 09/02/19  0731   WBC 9.58   RBC 2.63*   HGB 8.5*   HCT 25.7*      MCV 98   MCH 32.3*   MCHC 33.1     CMP:   Recent Labs   Lab 09/02/19  0731   *   CALCIUM 9.5   ALBUMIN 3.2*   PROT 7.6      K 2.7*   CO2 34*   CL 90*   BUN 45*   CREATININE 11.3*   ALKPHOS 198*   ALT 9*   AST 17   BILITOT 0.4     Assessment/Plan:     ESRD on hemodialysis  54 yro M with ESRD on HD MWF, HFpEF, hx of GIB, CVA/ intracerebral hemorrhage who presented to the [facility] ED 9/2/19 with abdominal pain and nausea and vomiting for 3 days. In the ED, symptoms greatly  improved with zofran. Also received 500 cc bolus of LR. CT chest/abdomen done in the showed no acute findings. Nephrology is consulted for management of ESRD and HD as an in-patient.     -[facility]  -Nephrologist: pt is unsure  -HD tx days: MWF  -HD tx time: 3 1/2hrs  -Last HD tx: Friday 8/30  -HD access: RUE AVF  -HD modality: iHD  -Residual urine: anuric   -EDW: pt is unsure     Will plan on HD today per normal out pt HD schedule  1-2 L as tolerated, keep map >65          Thank you for your consult. I will follow-up with patient. Please contact us if you have any additional questions.    Gavi Veras MD  Nephrology  [facility]    ATTENDING PHYSICIAN ATTESTATION  I have personally interviewed and examined the patient. I thoroughly reviewed the demographic, clinical, laboratorial and imaging information available in medical records. I agree with the assessment and recommendations provided by the subspecialty resident. Dr. Veras was under my  supervision.

## 2019-09-02 NOTE — ED NOTES
Patient identifiers verified and correct for Vaughn Retana   LOC: The patient is awake, alert and aware of environment with an appropriate affect, the patient is oriented x 3 and speaking appropriately.   APPEARANCE: Patient appears comfortable and in no acute distress, patient is clean and well groomed.  SKIN: The skin is warm and dry, color consistent with ethnicity, patient has normal skin turgor and moist mucus membranes, skin intact, no breakdown or bruising noted.   MUSCULOSKELETAL: Patient moving all extremities spontaneously, no swelling noted.  RESPIRATORY: Airway is open and patent, respirations are spontaneous, patient has a normal effort and rate, no accessory muscle use noted, pt placed on continuous pulse ox with O2 sats noted at 97% on room air.  CARDIAC: Pt placed on cardiac monitor. Patient has a normal rate and regular rhythm, no edema noted, capillary refill < 3 seconds.   GASTRO: Soft and non tender to palpation, no distention noted, normoactive bowel sounds present in all four quadrants. Pt hasnt had bowel movement in 3 days. Pt unable to tolerate PO food/liquids. Pt also has had n/v x3 days.   : Pt denies any pain or frequency with urination.  NEURO: Pt opens eyes spontaneously, behavior appropriate to situation, follows commands, facial expression symmetrical, bilateral hand grasp equal and even, purposeful motor response noted, normal sensation in all extremities when touched with a finger.

## 2019-09-02 NOTE — ASSESSMENT & PLAN NOTE
54 yro M with ESRD on HD MWF, HFpEF, hx of GIB, CVA/ intracerebral hemorrhage who presented to the [facility] ED 9/2/19 with abdominal pain and nausea and vomiting for 3 days. In the ED, symptoms greatly  improved with zofran. Also received 500 cc bolus of LR. CT chest/abdomen done in the showed no acute findings. Nephrology is consulted for management of ESRD and HD as an in-patient.     -Outpatient HD unit: [facility]  -Nephrologist: pt is unsure  -HD tx days: MWF  -HD tx time: 3 1/2hrs  -Last HD tx: Friday 8/30  -HD access: RUE AVF  -HD modality: iHD  -Residual urine: anuric   -EDW: pt is unsure     Will plan on HD today per normal out pt HD schedule  1-2 L as tolerated, keep map >65

## 2019-09-02 NOTE — PLAN OF CARE
Problem: Adult Inpatient Plan of Care  Goal: Plan of Care Review  HD Tx complete, 0mL removed during 3hr tx. Blood returned via [facility] AVf. 15g needles removed x2, gauze and tape applied, pressure held for 5mins, hemostasis achieved.

## 2019-09-02 NOTE — ED NOTES
Pt presents to ED w/ c/o n/v x3 days. Pt states that he has been unable to tolerate food or liquids PO x 3 days and he also hasnt had a BM in 3 days. Pt states that he has had 3 episodes of emesis, but denies blood in emesis. Pt reports taking home antiemetics, but has had no relief from n/v.  Pt states that back pain is diffuse through abdomen and radiates to his back. Pt is unable to describes his abd. Pain. Pt AAOx4.

## 2019-09-03 PROBLEM — E83.39 HYPOPHOSPHATEMIA: Status: ACTIVE | Noted: 2019-01-01

## 2019-09-03 NOTE — PROGRESS NOTES
Pharmacokinetic Initial Assessment: IV Vancomycin    Assessment/Plan:    Initiate intravenous vancomycin with loading dose of 1250 mg once with subsequent doses when random concentrations are less than 20 mcg/mL  Desired empiric serum trough concentration is 15 to 20 mcg/mL  Draw vancomycin random level on 09/04/19 with am labs.  Pharmacy will continue to follow and monitor vancomycin.      Please contact pharmacy at extension [phone] with any questions regarding this assessment.     Thank you for the consult,   Manuela Pereira, PharmD, BCPS       Patient brief summary:  Vaughn Retana is a 54 y.o. male initiated on antimicrobial therapy with IV Vancomycin for treatment of suspected bacteremia    Drug Allergies:   Review of patient's allergies indicates:   Allergen Reactions    Fosrenol [lanthanum] Nausea And Vomiting     Nausea and vomiting       Actual Body Weight:   59.7 kg    Renal Function:   Estimated Creatinine Clearance: 11.9 mL/min (A) (based on SCr of 5.9 mg/dL (H)).,     Dialysis Method (if applicable):  intermittent HD, MWF    CBC (last 72 hours):  Recent Labs   Lab Result Units 09/02/19  0731 09/03/19  0307   WBC K/uL 9.58 7.15   Hemoglobin g/dL 8.5* 7.1*   Hematocrit % 25.7* 22.3*   Platelets K/uL 209 164   Gran% % 70.5 55.6   Lymph% % 18.7 28.0   Mono% % 8.4 12.2   Eosinophil% % 1.7 3.6   Basophil% % 0.4 0.3   Differential Method  Automated Automated       Metabolic Panel (last 72 hours):  Recent Labs   Lab Result Units 09/02/19  0731 09/03/19  0307   Sodium mmol/L 140 142   Potassium mmol/L 2.7* 4.2   Chloride mmol/L 90* 113*   CO2 mmol/L 34* 19*   Glucose mg/dL 126* 75   BUN, Bld mg/dL 45* 18   Creatinine mg/dL 11.3* 5.9*   Albumin g/dL 3.2* 2.5*   Total Bilirubin mg/dL 0.4 0.3   Alkaline Phosphatase U/L 198* 160*   AST U/L 17 21   ALT U/L 9* 12   Magnesium mg/dL 2.3 2.1   Phosphorus mg/dL  --  1.8*       Drug levels (last 3 results):  No results for input(s): VANCOMYCINRA, VANCOMYCINPE,  VANCOMYCINTR in the last 72 hours.    Microbiologic Results:  Microbiology Results (last 7 days)     Procedure Component Value Units Date/Time    Blood culture [[id number]] Collected:  09/03/19 0949    Order Status:  Sent Specimen:  Blood Updated:  09/03/19 0950    Blood culture [[id number]] Collected:  09/03/19 0949    Order Status:  Sent Specimen:  Blood Updated:  09/03/19 0950    Blood culture #2 **CANNOT BE ORDERED STAT** [[id number]] Collected:  09/02/19 0732    Order Status:  Completed Specimen:  Blood from Peripheral, Antecubital, Left Updated:  09/03/19 0905     Blood Culture, Routine Gram stain aer bottle: Gram positive cocci in clusters resembling Staph       Results called to and read back by:Yomaira Stewart RN  09/03/2019  09:05    Blood culture #1 **CANNOT BE ORDERED STAT** [[id number]] Collected:  09/02/19 0711    Order Status:  Completed Specimen:  Blood from Peripheral, Antecubital, Left Updated:  09/02/19 1715     Blood Culture, Routine No Growth to date

## 2019-09-03 NOTE — CARE UPDATE
Rapid Response Nurse Chart Check     Chart check completed, abnormal VS noted. Bedside RN Aide contacted, no concerns   verbalized at this time, instructed to call [phone] for further concerns or assistance.

## 2019-09-03 NOTE — PLAN OF CARE
met patient to obtain discharge planning assessment.  Information obtained from patient.  Patient's transportation home will be provided by his sister via personal vehicle.      Yvette Zelaya NP     Payor: MEDICARE / Plan: MEDICARE PART A & B / Product Type: [facility] /       09/03/19 1145   Discharge Assessment   Assessment Type Discharge Planning Assessment   Confirmed/corrected address and phone number on facesheet? Yes   Assessment information obtained from? Patient   Expected Length of Stay (days) 3   Communicated expected length of stay with patient/caregiver yes   Prior to hospitilization cognitive status: Alert/Oriented   Prior to hospitalization functional status: Assistive Equipment   Current cognitive status: Alert/Oriented   Current Functional Status: Needs Assistance;Assistive Equipment   Lives With alone   Able to Return to Prior Arrangements yes   Is patient able to care for self after discharge? Yes   Who are your caregiver(s) and their phone number(s)? Alicia Retana-Sister        [phone]   Patient's perception of discharge disposition home or selfcare   Readmission Within the Last 30 Days current reason for admission unrelated to previous admission   If yes, most recent facility name: [facility]   Patient currently being followed by outpatient case management? No   Patient currently receives any other outside agency services? No   Equipment Currently Used at Home wheelchair   Do you have any problems affording any of your prescribed medications? No   Is the patient taking medications as prescribed? yes   Does the patient have transportation home? Yes   Transportation Anticipated family or friend will provide   Dialysis Name and Scheduled days [facility]   M-W-F   Does the patient receive services at the [facility]mad[facility] Clinic? No   Discharge Plan A Home   Discharge Plan B Home with family   DME Needed Upon Discharge    (TBD)   Patient/Family  in Agreement with Plan yes

## 2019-09-03 NOTE — PLAN OF CARE
Problem: Adult Inpatient Plan of Care  Goal: Plan of Care Review  Outcome: Ongoing (interventions implemented as appropriate)  Pt. Lying in bed,doesn't want to be bothered,finally responded back after repeated verbal communication with him. Fall precautions maintained. Pt. continues to complain of nausea and vomiting,retching and spitting in bag,controlled with compazine but then pt;s b/p dropped to 82/50. DR notified and pt bolused with 500cc NS pressure up coral 92/52. Pt. To go to dialysis on Wednesday. No breakdown to skin pt. Moves himself in the bed. Continue current plan of care

## 2019-09-03 NOTE — CONSULTS
[facility]  Gastroenterology  Consult Note    Patient Name: Vaughn Retana  MRN: [id number]  Admission Date: 9/2/2019  Hospital Length of Stay: 0 days  Code Status: Full Code   Attending Provider: Gavino Cordoba MD   Consulting Provider: Sotero Ojeda MD  Primary Care Physician: Yvette Zelaya NP  Principal Problem:<principal problem not specified>    Inpatient consult to Gastroenterology  Consult performed by: Sotero Ojeda MD  Consult ordered by: Gavino Cordoba MD        Subjective:     HPI: Vaughn Retana is a 54 y.o. male with history of ESRD, h/o esophagitis, on chronic opiates, was admitted with chief complaint of abdominal pain, nausea, and vomiting. Denies blood in vomit. Nausea controlled with antiemetics. he denies dysphagia, odynophagia. He reports this started yesterday, and hasnt tried anything to eat since then. He denies abdominal pain. He feels the need to constantly spit saliva, but denies sensation of food getting stuck, dysphagia, or odynophagia. He denies marijuana use. CTA chest abdomen negative for aortic aneurysm or dissection, negative for cholecystitis.     We had recently seen him on 8/26-8/27, at that time had complaints of coffee ground emesis, EGD in 3/2019 showed grade D esophagitis. Also has a history of non-bleeding gastric ulcer (not seen on most recent EGDs this year). He was admitted to [facility] twice early August for same, EGD 8/2 showed grade A esophagitis with no active bleeding.No EGD was done given recent one at [facility] with grade A esophagitis. We had recommended PPI BID for 8-12 weeks, H2 blocker at night, trial of sucralfate, scheduled Zofran, and outpatient gastric emptying study which has not been done yet.     He is currently not on opiates while in hospital, at home is prescribed Norco 5 q4h PRN.         Past Medical History:   Diagnosis Date    Amputation stump pain 9/10/2013    Aspiration pneumonia 7/27/2015     Asterixis 11/8/2016    C. difficile colitis 8/7/2015    Cholelithiasis without obstruction 8/25/2015    Chronic diastolic heart failure     2-23-17   1 - Low normal to mildly depressed left ventricular systolic function (EF 50-55%).    2 - Right ventricular enlargement with mildly depressed systolic function.    3 - Left ventricular diastolic dysfunction.    4 - Right atrial enlargement.    5 - Severe tricuspid regurgitation.    6 - Pulmonary hypertension. The estimated PA systolic pressure is 86 mmHg.    7 - Increased central venous pressure.    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    Metabolic acidosis, IAG, reduced excretion of inorganic acids     Myoclonic jerking 9/20/2016    Noncompliance with medication regimen 12/4/2018    Secondary hyperparathyroidism (of renal origin)     Secondary pulmonary hypertension 3/23/2017    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    TB lung, latent 08/25/2015    Negative Quantiferon Gold 3-23-17       Past Surgical History:   Procedure Laterality Date    AMPUTATION, BELOW KNEE Left 12/18/2013    Performed by Elgin Houston MD at [facility] OR 1ST FLR    COLONOSCOPY      COLONOSCOPY N/A 4/4/2017    Performed by Walker Stern MD at [facility] ENDO (2ND FLR)    EGD (ESOPHAGOGASTRODUODENOSCOPY) N/A 3/7/2019    Performed by Man Galicia MD at [facility] ENDO (2ND FLR)    EGD (ESOPHAGOGASTRODUODENOSCOPY) N/A 6/12/2018    Performed by  Man Galicia MD at [facility] ENDO (2ND FLR)    ESOPHAGOGASTRODUODENOSCOPY (EGD) N/A 5/22/2018    Performed by Ke Sparks MD at [facility] ENDO (2ND FLR)    ESOPHAGOGASTRODUODENOSCOPY (EGD) N/A 3/16/2018    Performed by Kevin De La Paz MD at [facility] ENDO (2ND FLR)    ESOPHAGOGASTRODUODENOSCOPY (EGD) N/A 3/8/2018    Performed by Man Galicia MD at [facility] ENDO (2ND FLR)    ESOPHAGOGASTRODUODENOSCOPY (EGD) N/A 4/4/2017    Performed by Walker Stern MD at [facility] ENDO (2ND FLR)    ESOPHAGOGASTRODUODENOSCOPY (EGD) N/A 10/17/2014    Performed by Man Galicia MD at [facility] ENDO (2ND FLR)    FISTULOGRAM Right 9/18/2014    Performed by Grayson Hubbard MD at [facility] CATH LAB    FOOT AMPUTATION THROUGH METATARSAL      left foot    LEG AMPUTATION THROUGH KNEE  12/18/2013    left BKA    R AVF  9/12/12    UPPER GASTROINTESTINAL ENDOSCOPY         Family History   Problem Relation Age of Onset    Early death Mother     Kidney disease Father     Hypertension Father     Heart disease Father     Hyperlipidemia Father     Diabetes Brother     Alcohol abuse Maternal Grandmother     Diabetes Maternal Grandfather     Hypertension Sister     Melanoma Neg Hx        Social History     Socioeconomic History    Marital status:      Spouse name: Not on file    Number of children: Not on file    Years of education: Not on file    Highest education level: Not on file   Occupational History    Not on file   Social Needs    Financial resource strain: Not on file    Food insecurity:     Worry: Not on file     Inability: Not on file    Transportation needs:     Medical: Not on file     Non-medical: Not on file   Tobacco Use    Smoking status: Former Smoker     Packs/day: 1.00     Years: 10.00     Pack years: 10.00    Smokeless tobacco: Never Used   Substance and Sexual Activity    Alcohol use: No    Drug use: No    Sexual activity: Yes     Partners: Female     Birth control/protection: None   Lifestyle    Physical  activity:     Days per week: Not on file     Minutes per session: Not on file    Stress: Not on file   Relationships    Social connections:     Talks on phone: Not on file     Gets together: Not on file     Attends Religion service: Not on file     Active member of club or organization: Not on file     Attends meetings of clubs or organizations: Not on file     Relationship status: Not on file   Other Topics Concern    Not on file   Social History Narrative    Not on file       No current facility-administered medications on file prior to encounter.      Current Outpatient Medications on File Prior to Encounter   Medication Sig Dispense Refill    carvedilol (COREG) 25 MG tablet Take 1 tablet (25 mg total) by mouth 2 (two) times daily with meals. 180 tablet 1    ondansetron (ZOFRAN) 8 MG tablet Take 1 tablet (8 mg total) by mouth every 8 (eight) hours as needed for Nausea. 40 tablet 0    pantoprazole (PROTONIX) 40 MG tablet Take 1 tablet (40 mg total) by mouth 2 (two) times daily before meals. 60 tablet 11    sevelamer carbonate (RENVELA) 800 mg Tab Take 2 tablets (1,600 mg total) by mouth 3 (three) times daily with meals. 180 tablet 11    sucralfate (CARAFATE) 100 mg/mL suspension Take 5 mLs (500 mg total) by mouth 2 (two) times daily. 1 Bottle 3    acetaminophen (TYLENOL) 325 MG tablet Take 2 tablets (650 mg total) by mouth every 8 (eight) hours as needed.  0    HYDROcodone-acetaminophen (NORCO) 5-325 mg per tablet Take 1 tablet by mouth every 4 (four) hours as needed. Need to wean and discontinue.  High risk for causing recurrent N&V, gastroparesis. 6 tablet 0    RENAPLEX-D 800 mcg-12.5 mg -2,000 unit Tab Take 1 tablet by mouth once daily.  3       Review of patient's allergies indicates:   Allergen Reactions    Fosrenol [lanthanum] Nausea And Vomiting     Nausea and vomiting       Review of Systems:   Constitutional: no fever, chills or change in weight   Eyes: no visual changes   ENT: no sore  throat or dysphagia  Respiratory: no cough or shortness of breath   Cardiovascular: no chest pain or palpitations   Gastrointestinal: as per HPI  Hematologic/Lymphatic: no easy bruising or lymphadenopathy   Musculoskeletal: no arthralgias or myalgias   Neurological: no change in mental status  Behavioral/Psych: no change in mood    Objective:     Vitals:    09/03/19 0721   BP: 106/65   Pulse: 93   Resp: 16   Temp: 97 °F (36.1 °C)       General: Alert and Oriented, no distress  HEENT: Normocephalic, Atraumatic. No scleral icterus.  Resp: Good air entry bilaterally, no adventitious sounds  Cardiac: S1 and S2 normal  Abdomen: Normoactive bowel sounds. Non-distended. Normal tympany. Soft. Non-tender. No peritoneal signs.  Extremities: No peripheral edema.   Neurologic: No gross neurological Deficits  Psych: Calm, cooperative. Normal mood and affect.    Significant Labs:  Recent Labs   Lab 09/02/19  0731 09/03/19  0307   HGB 8.5* 7.1*       Lab Results   Component Value Date    WBC 7.15 09/03/2019    HGB 7.1 (L) 09/03/2019    HCT 22.3 (L) 09/03/2019     (H) 09/03/2019     09/03/2019       Lab Results   Component Value Date     09/03/2019    K 4.2 09/03/2019     (H) 09/03/2019    CO2 19 (L) 09/03/2019    BUN 18 09/03/2019    CREATININE 5.9 (H) 09/03/2019    CALCIUM 9.0 09/03/2019    ANIONGAP 10 09/03/2019    ESTGFRAFRICA 11.4 (A) 09/03/2019    EGFRNONAA 9.9 (A) 09/03/2019       Lab Results   Component Value Date    ALT 12 09/03/2019    AST 21 09/03/2019    ALKPHOS 160 (H) 09/03/2019    BILITOT 0.3 09/03/2019       Lab Results   Component Value Date    INR 1.1 08/25/2019    INR 1.1 08/23/2019    INR 1.1 08/22/2019       Significant Imaging:  Reviewed pertinent radiology findings.       Assessment/Plan:     Vaughn Retana is a 54 y.o. male with  history of ESRD, h/o esophagitis, on chronic opiates, we are consulted for intractable nausea and vomiting. EGD 8/2 showed grade A esophagitis with no  active bleeding and no outlet obstruction .No EGD was done given recent one at [facility] with grade A esophagitis. We had recommended PPI BID for 8-12 weeks, H2 blocker at night, trial of sucralfate, scheduled Zofran, and outpatient gastric emptying study which has not been done yet.     He is currently not on opiates while in hospital, at home is prescribed Norco 5 q4h PRN.       Problem List:  1. Intractable nausea vomiting  2. esophagitis    Plan:  -recommend gastric emptying test while off opiates.  -continue PPI 40 daily  -correct fluid and electrolyte imbalances and nutritional deficiencies  -dietary management   -eating small, low-fat, low-fiber meals 4-5 times/day   -copious non carbonated fluid intake   -may need referral to a dietitian  -consider reglan usual starting dose is 10 mg QID (30 minutes prior to meals and at bedtime), dose can be increased to 20 if response is inadequate  -can use other antiemetic medications such as phenothiazines, antihistamines, or Zofran alone or in conjunction with prokinetic agents.     Thank you for involving us in the care of Vaughn Retana. Please call with any additional questions, concerns or changes in the patient's clinical status.    Sotero Ojeda MD  Gastroenterology Fellow PGY IV   [facility]

## 2019-09-03 NOTE — PLAN OF CARE
POC reviewed with pt. Pt AAO4, remains afebrile, no acute distress throughout shift. Vitals signs stable. Remains free from injury. Pt turns in bed independently. Pt does not complain of any nausea but however still spitting a lot. No urine output throughout shift. Pending urine sample. Call light in reach. Safety maintained. Will continue to monitor.

## 2019-09-03 NOTE — PROGRESS NOTES
Progress Note  Hospital Medicine    Admit Date: 9/2/2019  Length of Stay:  LOS: 0 days     SUBJECTIVE:         Follow-up For:  Nausea and vomiting    HPI/Interval history (See H&P for complete P,F,SHx) :   lion Retana is a 54 y.o. male with PMH of ESRD (MWF), history of GI bleed/ erosive esophagitis, HTN , CVA/ intracerebral hemorrhage,  HFpEF, who presents to [facility] today with a chief complaint of abdominal pain. Patient reports his abdominal pain started 3 days ago without any inciting events.  The pain is located diffusely across his abdomen, is nonradiating.  It is sharp and cramping in quality, 8/10 in intensity.  He does not associated the pain with any aggravating or alleviating factors.  Patient does have associated nausea and vomiting for the past 3 days.  He reports having 3 episodes of nonbloody, nonbilious emesis over the last 3 days.  He has antiemetics prescribed at home, which he took yesterday but did not provide any relief from his nausea and vomiting.  Patient also complaints of constipation and his last bowel movement was 4 days ago.  Patient denies any melena, hematochezia, fever, chills, malaise, urinary urgency, frequency, dysuria, cough, sputum production, chest pain, shortness of breath.  Patient has been compliant with his dialysis reports last HD session was on Friday 8/30.  He also complains of feeling really weak and fatigued today.     While in the ED, patient is noted to be afebrile, hemodynamically stable, saturating 100% on room air.  No leukocytosis, WBC of 9.5.  H&H chronically low today 8.5/25.7.  Noted to be hypokalemic with potassium of 2.7.  BUN/creatinine of 45/11.3.  Alk-phos elevated to 198.  Troponin elevated to 0.173.  CT chest/abdomen done in the ED shows no acute findings, no evidence of aortic aneurysm or dissection.  Cholelithiasis without evidence of cholecystitis.  CXR nonacute.  Patient is due for his dialysis session today.  Nephrology was consulted for  ongoing HD.  Patient admitted to Hospital Medicine for further management.    Interval history  Blood cultures from 09/02/2019 with Gram-positive cocci.   1 dose of vancomycin today and repeat blood cultures.  Obtain vancomycin random in a.m..  Status post GI evaluation.  Reglan dose increased to 10 mg t.i.d. with meals.  Continue with prochlorperazine for nausea as 2nd choice        Review of Systems: List if applicable  Pain scale: 0/10  Constitutional: Negative for chills, diaphoresis, fatigue and fever.   HENT: Negative for sore throat.    Respiratory: Negative for chest tightness, shortness of breath and wheezing.    Cardiovascular: Negative for chest pain and palpitations.   Gastrointestinal: Positive for nausea and vomiting Negative for abdominal distention, abdominal pain, anal bleeding and blood in stool.   Endocrine: Negative for cold intolerance and heat intolerance.   Genitourinary: Negative for hematuria.   Musculoskeletal: Negative for arthralgias and joint swelling.   Skin: Negative for pallor and rash.   Neurological: Negative for dizziness, weakness, light-headedness, numbness and headaches.   Psychiatric/Behavioral: Negative for hallucinations. The patient is not nervous/anxious.         OBJECTIVE:     Vital Signs Range (Last 24H):  Temp:  [97 °F (36.1 °C)-98.8 °F (37.1 °C)]   Pulse:  []   Resp:  [16-19]   BP: ()/(44-78)   SpO2:  [96 %-100 %]     Physical Exam:  General- Patient alert and oriented x3   HEENT- PERRLA, EOMI, OP clear  Neck- No JVD, Lymphadenopathy, Thyromegaly  CV- Regular rate and rhythm, No Murmur/cheryl/rubs  Resp- Lungs CTA Bilaterally, No increased WOB  Abdomen- Non tender/non-distended, BS normoactive x4 quads, no HSM  Extrem- No cyanosis, clubbing, edema. Right upper extremity AV fistula thrill present.  Left below-knee amputation (ambulates with prosthesis)  Skin- No rashes, lesions, ulcers  Neuro- Strength 5/5 flexors/extensors,Intact sensation to light touch  grossly        Medications:  Medication list was reviewed and changes noted under Assessment/Plan.      Current Facility-Administered Medications:     0.9%  NaCl infusion, , Intravenous, PRN, Mariposa Dalton MD    acetaminophen tablet 650 mg, 650 mg, Oral, Q8H PRN, Mariposa Dalton MD    atorvastatin tablet 80 mg, 80 mg, Oral, QHS, Mariposa Dalton MD, 80 mg at 09/02/19 2106    dextrose 10% (D10W) Bolus, 12.5 g, Intravenous, PRN, Mariposa Dalton MD    dextrose 10% (D10W) Bolus, 25 g, Intravenous, PRN, Mariposa Dalton MD    glucagon (human recombinant) injection 1 mg, 1 mg, Intramuscular, PRN, Mariposa Dalton MD    glucose chewable tablet 16 g, 16 g, Oral, PRN, Mariposa Dalton MD    glucose chewable tablet 24 g, 24 g, Oral, PRN, Mariposa Dalton MD    HYDROcodone-acetaminophen 5-325 mg per tablet 1 tablet, 1 tablet, Oral, Q4H PRN, Mariposa Dalton MD    metoclopramide HCl tablet 10 mg, 10 mg, Oral, TID AC, Gavino Cordoba MD    ondansetron injection 4 mg, 4 mg, Intravenous, Q6H, Mariposa Dalton MD, 4 mg at 09/03/19 1101    pantoprazole EC tablet 40 mg, 40 mg, Oral, BID AC, Mariposa Dalton MD, 40 mg at 09/03/19 0532    prochlorperazine injection Soln 10 mg, 10 mg, Intravenous, Q6H PRN, Mariposa Dalton MD, 10 mg at 09/02/19 2159    senna-docusate 8.6-50 mg per tablet 1 tablet, 1 tablet, Oral, BID PRN, Mariposa Dalton MD    sodium chloride 0.9% flush 5 mL, 5 mL, Intravenous, PRN, Mariposa Dalton MD    sucralfate 100 mg/mL suspension 0.5 g, 0.5 g, Oral, BID, Mariposa Dalton MD, 0.5 g at 09/03/19 0801    sodium chloride 0.9%, acetaminophen, Dextrose 10% Bolus, Dextrose 10% Bolus, glucagon (human recombinant), glucose, glucose, HYDROcodone-acetaminophen, prochlorperazine, senna-docusate 8.6-50 mg, sodium chloride 0.9%    Laboratory/Diagnostic Data:  Reviewed and noted in plan where applicable- Please see chart for full lab data.    Recent Labs   Lab 09/02/19  0731 09/03/19  0307   WBC 9.58  7.15   HGB 8.5* 7.1*   HCT 25.7* 22.3*    164       Recent Labs   Lab 09/02/19  0731 09/03/19  0307    142   K 2.7* 4.2   CL 90* 113*   CO2 34* 19*   BUN 45* 18   CREATININE 11.3* 5.9*   * 75   CALCIUM 9.5 9.0   MG 2.3 2.1   PHOS  --  1.8*   LIPASE 43  --        Recent Labs   Lab 09/02/19  0731 09/03/19  0307   ALKPHOS 198* 160*   ALT 9* 12   AST 17 21   ALBUMIN 3.2* 2.5*   PROT 7.6 6.0   BILITOT 0.4 0.3        Microbiology labs for the last week  Microbiology Results (last 7 days)     Procedure Component Value Units Date/Time    Blood culture #1 **CANNOT BE ORDERED STAT** [[id number]] Collected:  09/02/19 0711    Order Status:  Completed Specimen:  Blood from Peripheral, Antecubital, Left Updated:  09/03/19 1012     Blood Culture, Routine No Growth to date      No Growth to date    Blood culture [[id number]] Collected:  09/03/19 0949    Order Status:  Sent Specimen:  Blood Updated:  09/03/19 1000    Blood culture [[id number]] Collected:  09/03/19 0949    Order Status:  Sent Specimen:  Blood Updated:  09/03/19 1000    Blood culture #2 **CANNOT BE ORDERED STAT** [[id number]] Collected:  09/02/19 0732    Order Status:  Completed Specimen:  Blood from Peripheral, Antecubital, Left Updated:  09/03/19 0905     Blood Culture, Routine Gram stain aer bottle: Gram positive cocci in clusters resembling Staph       Results called to and read back by:Yomaira Stewart RN  09/03/2019  09:05           Imaging Results          CTA Chest Abdomen Non Coronary (Final result)  Result time 09/02/19 10:41:09    Final result by Jessica Bowles MD (09/02/19 10:41:09)             Impression:      No acute findings.  No evidence of aortic aneurysm or dissection.    Cholelithiasis without evidence of acute cholecystitis.    Additional findings as above.    Electronically signed by resident: Redd Canales  Date:    09/02/2019  Time:    09:32    Electronically signed by: Jessica Bowles MD  Date:    09/02/2019  Time:    10:41            Narrative:    EXAMINATION:  CTA CHEST ABDOMEN NON CORONARY (XPD)    CLINICAL HISTORY:  Chest/back pain, acute, aortic dissection suspect;    TECHNIQUE:  CTA chest and abdomen performed before and after administration of 100 cc IV Omnipaque 350 as per dissection protocol. Coronal and sagittal reformats provided.    COMPARISON:  CT chest without from 12/05/2018.  CT abdomen/pelvis contrast from 04/03/2018.  Multiple additional prior examinations.    FINDINGS:  Thoracic soft tissues: Multiple normal sized axillary lymph nodes.  Thyroid gland appears unremarkable.    Aorta: Thoracic aorta normal in course and caliber.  No significant atherosclerotic plaque.  No evidence of aortic dissection or aneurysm.  Left-sided 3 vessel arch.    Heart: Normal in size. No pericardial effusion.    Katina/Mediastinum: Multiple normal sized mediastinal lymph nodes.    Lungs: Symmetrically expanded.  No focal consolidation or mass.  Small dependent pleural calcifications on the left.  Calcified granuloma at the left lung base.  Micronodule right lung base, stable and benign.  No pneumothorax.  No significant pleural fluid.    Liver: Normal in size and attenuation.  No focal hepatic lesions.    Gallbladder: 1.8 cm calcified gallstone.  No wall thickening or pericholecystic fluid.    Bile Ducts: No evidence of dilated ducts.    Pancreas: No mass or peripancreatic fat stranding.    Spleen: Unremarkable.  Incidental note is made of a small splenule.    Adrenals: Stable nonspecific thickening of the bilateral adrenal glands.    Kidneys/ Ureters: Small and atrophic consistent with end-stage renal disease.  Numerous hypodensities are seen throughout both kidneys, largest consistent with simple cysts.  Bilateral punctate vascular calcifications versus small nonobstructing stones.  Adequate concentration of contrast.  No hydronephrosis or nephrolithiasis. No ureteral dilatation.    GI Tract/Mesentery: Small hiatal hernia. Moderate stool  "throughout the colon. No evidence of bowel obstruction or inflammation.    Peritoneal Space: No ascites. No free air.    Retroperitoneum:  Multiple normal sized periaortic lymph nodes.    Abdominal wall:  Unremarkable.    Vasculature: Abdominal aorta is normal course and caliber with mild atherosclerotic plaque.  No aneurysm or dissection.    Bones: No acute fracture.  Diffuse osseous sclerosis consistent with renal osteodystrophy. Age-appropriate degenerative changes.                             X-Ray Chest PA And Lateral (Final result)  Result time 09/02/19 08:08:44    Final result by Jatin [facility] MD (09/02/19 08:08:44)             Impression:      1. No acute radiographic findings in the chest.      Electronically signed by: Jatin [facility] MD  Date:    09/02/2019  Time:    08:08           Narrative:    EXAMINATION:  XR CHEST PA AND LATERAL    CLINICAL HISTORY:  Unspecified abdominal pain    TECHNIQUE:  PA and lateral views of the chest were performed.    COMPARISON:  Radiograph 08/19/2019.    FINDINGS:  Cardiac monitoring leads project over the bilateral hemithoraces.  Vascular stent projects over the right upper chest.  Mediastinal structures are midline. Hilar contours are unremarkable.  Cardiac silhouette is normal in size. Lung volumes are normal and symmetric. No consolidation. No pneumothorax or pleural effusions. No free air beneath the diaphragm. No acute osseous abnormalities.                              Estimated body mass index is 21.24 kg/m² as calculated from the following:    Height as of this encounter: 5' 6" (1.676 m).    Weight as of this encounter: 59.7 kg (131 lb 9.8 oz).    I & O (Last 24H):    Intake/Output Summary (Last 24 hours) at 9/3/2019 1400  Last data filed at 9/3/2019 1200  Gross per 24 hour   Intake 1580 ml   Output 545 ml   Net 1035 ml       Estimated Creatinine Clearance: 11.9 mL/min (A) (based on SCr of 5.9 mg/dL (H)).    ASSESSMENT/PLAN:     Active Problems:      Active " Hospital Problems    Diagnosis  POA    *Nausea and vomiting [R11.2] recurrent admissions 3rd admission in the last 2 weeks.  Status post gastroenterology evaluation. Status post GI evaluation.  Reglan dose increased to 10 mg t.i.d. with meals.  Continue with prochlorperazine for nausea as 2nd choice  recommended PPI BID for 8-12 weeks, H2 blocker at night, trial of sucralfate, scheduled Zofran, and outpatient gastric emptying study which has not been done yet.   He is currently not on opiates while in hospital, at home is prescribed Norco 5 q4h PRN.     -recommend gastric emptying test while off opiates.  -dietary management              -eating small, low-fat, low-fiber meals 4-5 times/day              -copious non carbonated fluid intake              -may need referral to a dietitian        Bacteremia- Blood cultures from 09/02/2019 with Gram-positive cocci.   1 dose of vancomycin today and repeat blood cultures.  Obtain vancomycin random in a.m..  Monitor for toxicity  Unknown    Hypophosphatemia [E83.39] 1.8.  PhosLo discontinued follow-up with Nephrology  Unknown    Uremia [N19] ESRD.  Nephrology consulted continue with dialysis on Monday Wednesday Friday as per schedule    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, without long-term current use of insulin [E11.29]Currently controlled with diet with his last HgbA1c being 4.1.  Currently on renal diet.  Dietitian consulted as above  Yes    GERD with esophagitis [K21.0] as above    Abdominal pain- improved. WBC, lactic acid, lipase all within normal limits  -CT abdomen nonacute    Yes    Renovascular hypertension [I15.0] controlled with Coreg    Yes     Chronic    Hypokalemia [E87.6]Hypokalemia:  -potassium of 2.7 .  Replaced resolved to 4.2  -replete and monito  Unknown    Peripheral vascular disease in diabetes mellitus [E11.51]  Yes     Chronic    Dyslipidemia [E78.5] continue atorvastatin  Yes     Chronic    ESRD on hemodialysis  [N18.6, Z99.2] nephrology consulted  Not Applicable     Chronic     MWF at [facility]      Anemia in chronic kidney disease [N18.9, D63.1] hemoglobin 8.5 on 09/02/2019 currently at 7.1.  Denies any hematemesis.  Monitor CBC  Yes     Chronic    Secondary hyperparathyroidism of renal origin [N25.81]  Yes     Chronic      Resolved Hospital Problems   No resolved problems to display.         Disposition- home    DVT prophylaxis addressed with:  Bilateral SCDs            Gavino Cordoba MD  Attending Staff Physician  [facility]  pager- [phone] [phone] - [phone]

## 2019-09-04 NOTE — CONSULTS
"  [facility]  Adult Nutrition  Consult Note    SUMMARY     Recommendations    1. Continue Renal diet.    - Encourage po intake.     2. If po intake <50%, recommend adding Novasource Renal.     3. RD following.     Goals: consume >75% of all meals  Nutrition Goal Status: new  Communication of RD Recs: (POC)    Reason for Assessment    Reason For Assessment: consult  Diagnosis: (Nausea and vomiting)  Relevant Medical History: ESRD on HD, T2DM on insulin, s/p L BKA 13, HTN, CVA, CHF, HTN, anemia  Interdisciplinary Rounds: did not attend  General Information Comments: Pt agitated, resting in bed with no family members at bedside. States that he can't tell if he has an appetite or not. Per chart review, pt po intake varies. Does not want Novasource Renal and states that he needs to eat instead. Denies N/V/D/C. Pt reports normal appetite PTA. Noted wt loss, but not significant. Declined NFPE. Unable to fully assess for malnutrition at this time.  Nutrition Discharge Planning: adequate po intake    Nutrition Risk Screen    Nutrition Risk Screen: no indicators present    Nutrition/Diet History    Spiritual, Cultural Beliefs, [employer] Practices, Values that Affect Care: no    Anthropometrics    Temp: 98.6 °F (37 °C)  Height Method: Stated  Height: 5' 6" (167.6 cm)(per RN )  Height (inches): 66 in  Weight Method: Standard Scale  Weight: 59.7 kg (131 lb 9.8 oz)  Weight (lb): 131.62 lb  Ideal Body Weight (IBW), Male: 142 lb  % Ideal Body Weight, Male (lb): 92.69 lb  BMI (Calculated): 21.3  BMI Grade: 18.5-24.9 - normal  Weight Loss: unintentional  Usual Body Weight (UBW), k.9 kg(per chart review 3/6/19)  % Usual Body Weight: 93.62  % Weight Change From Usual Weight: -6.57 %     Lab/Procedures/Meds    Pertinent Labs Reviewed: reviewed  Pertinent Labs Comments: BUN 23, Cr 8.3, GFR 7.5, alk phos 171  Pertinent Medications Reviewed: reviewed  Pertinent Medications Comments: statin, " pantoprazole    Estimated/Assessed Needs    Weight Used For Calorie Calculations: 59.7 kg (131 lb 9.8 oz)  Energy Calorie Requirements (kcal): 1718 kcal/day  Energy Need Method: [place]-St Jeor(x 1.25)  Protein Requirements: 72-78 gm/day(1.2-1.3 gm/kg)  Weight Used For Protein Calculations: 59.7 kg (131 lb 9.8 oz)  Fluid Requirements (mL): 1 mL/kcal or per MD     RDA Method (mL): 1718  CHO Requirement: 50% kcal from CHO    Nutrition Prescription Ordered    Current Diet Order: Renal     Evaluation of Received Nutrient/Fluid Intake    I/O: +1.14L since admit  Tolerance: tolerating  % Intake of Estimated Energy Needs: Other: varies - ate 50-75% of meals yesterday  % Meal Intake: Other: varies - ate 50-75% of meals yesterday    Nutrition Risk    Level of Risk/Frequency of Follow-up: (f/u 1 x wk)     Assessment and Plan    Nutrition Problem  Inadequate Oral Intake    Related to (etiology):   Suspected decreased appetite     Signs and Symptoms (as evidenced by):   Pt eating <75% of meals per chart review     Interventions(treatment strategy):  Collaboration of nutrition care with other providers    Nutrition Diagnosis Status:   New    Monitor and Evaluation    Food and Nutrient Intake: energy intake, food and beverage intake  Food and Nutrient Adminstration: diet order  Physical Activity and Function: nutrition-related ADLs and IADLs  Anthropometric Measurements: weight, weight change, body mass index  Biochemical Data, Medical Tests and Procedures: electrolyte and renal panel, gastrointestinal profile, glucose/endocrine profile, inflammatory profile, lipid profile  Nutrition-Focused Physical Findings: overall appearance     Nutrition Follow-Up    RD Follow-up?: Yes

## 2019-09-04 NOTE — NURSING
Pt refused am meds. Refused to present arm band to scan/verify pt identifiers. Will continue to monitor.

## 2019-09-04 NOTE — PROGRESS NOTES
Progress Note  Hospital Medicine    Admit Date: 9/2/2019  Length of Stay:  LOS: 0 days     SUBJECTIVE:         Follow-up For:  Nausea and vomiting    HPI/Interval history (See H&P for complete P,F,SHx) :   lion Retana is a 54 y.o. male with PMH of ESRD (MWF), history of GI bleed/ erosive esophagitis, HTN , CVA/ intracerebral hemorrhage,  HFpEF, who presents to [facility] today with a chief complaint of abdominal pain. Patient reports his abdominal pain started 3 days ago without any inciting events.  The pain is located diffusely across his abdomen, is nonradiating.  It is sharp and cramping in quality, 8/10 in intensity.  He does not associated the pain with any aggravating or alleviating factors.  Patient does have associated nausea and vomiting for the past 3 days.  He reports having 3 episodes of nonbloody, nonbilious emesis over the last 3 days.  He has antiemetics prescribed at home, which he took yesterday but did not provide any relief from his nausea and vomiting.  Patient also complaints of constipation and his last bowel movement was 4 days ago.  Patient denies any melena, hematochezia, fever, chills, malaise, urinary urgency, frequency, dysuria, cough, sputum production, chest pain, shortness of breath.  Patient has been compliant with his dialysis reports last HD session was on Friday 8/30.  He also complains of feeling really weak and fatigued today.     While in the ED, patient is noted to be afebrile, hemodynamically stable, saturating 100% on room air.  No leukocytosis, WBC of 9.5.  H&H chronically low today 8.5/25.7.  Noted to be hypokalemic with potassium of 2.7.  BUN/creatinine of 45/11.3.  Alk-phos elevated to 198.  Troponin elevated to 0.173.  CT chest/abdomen done in the ED shows no acute findings, no evidence of aortic aneurysm or dissection.  Cholelithiasis without evidence of cholecystitis.  CXR nonacute.  Patient is due for his dialysis session today.  Nephrology was consulted for  ongoing HD.  Patient admitted to Hospital Medicine for further management.    09/03/2019  Blood cultures from 09/02/2019 with Gram-positive cocci.   1 dose of vancomycin today and repeat blood cultures.  Obtain vancomycin random in a.m..  Status post GI evaluation.  Reglan dose increased to 10 mg t.i.d. with meals.  Continue with prochlorperazine for nausea as 2nd choice  09/04/2019  Hemoglobin dropped to 6.7 .  Obtain fecal occult blood.  Denies vomiting .  Transfusion with 1 unit of packed RBC today.  Vancomycin random at 21.  status post hemodialysis, blood cultures from 09/02/2019 1/2 with coagulase-negative Staphylococcus.  Likely contaminant.  Vancomycin discontinued      Review of Systems: List if applicable  Pain scale: 0/10  Constitutional: Negative for chills, diaphoresis, fatigue and fever.   HENT: Negative for sore throat.    Respiratory: Negative for chest tightness, shortness of breath and wheezing.    Cardiovascular: Negative for chest pain and palpitations.   Gastrointestinal: Positive for nausea and vomiting - improved Negative for abdominal distention, abdominal pain, anal bleeding and blood in stool.   Endocrine: Negative for cold intolerance and heat intolerance.   Genitourinary: Negative for hematuria.   Musculoskeletal: Negative for arthralgias and joint swelling.   Skin: Negative for pallor and rash.   Neurological: Negative for dizziness, weakness, light-headedness, numbness and headaches.   Psychiatric/Behavioral: Negative for hallucinations. The patient is not nervous/anxious.         OBJECTIVE:     Vital Signs Range (Last 24H):  Temp:  [98 °F (36.7 °C)-98.7 °F (37.1 °C)]   Pulse:  [78-98]   Resp:  [16-18]   BP: (105-160)/(58-69)   SpO2:  [100 %]     Physical Exam:  General- Patient alert and oriented x3   HEENT- PERRLA, EOMI, OP clear  Neck- No JVD, Lymphadenopathy, Thyromegaly  CV- Regular rate and rhythm, No Murmur/cheryl/rubs  Resp- Lungs CTA Bilaterally, No increased WOB  Abdomen- Non  tender/non-distended, BS normoactive x4 quads, no HSM  Extrem- No cyanosis, clubbing, edema. Right upper extremity AV fistula thrill present.  Left below-knee amputation (ambulates with prosthesis)  Skin- No rashes, lesions, ulcers  Neuro- Strength 5/5 flexors/extensors,Intact sensation to light touch grossly        Medications:  Medication list was reviewed and changes noted under Assessment/Plan.      Current Facility-Administered Medications:     0.9%  NaCl infusion (for blood administration), , Intravenous, Q24H PRN, Gavino Cordoba MD    0.9%  NaCl infusion, , Intravenous, PRN, Mariposa Dalton MD    0.9%  NaCl infusion, , Intravenous, Once, Glenis Benavidez, ADILIA, FNP-C, Last Rate: 100 mL/hr at 09/04/19 1341, 350 mL at 09/04/19 1341    acetaminophen tablet 650 mg, 650 mg, Oral, Q8H PRN, Mariposa Dalton MD    atorvastatin tablet 80 mg, 80 mg, Oral, QHS, Mariposa Dalton MD, 80 mg at 09/03/19 2000    dextrose 10% (D10W) Bolus, 12.5 g, Intravenous, PRN, Mariposa Dalton MD    dextrose 10% (D10W) Bolus, 25 g, Intravenous, PRN, Mariposa Dalton MD    glucagon (human recombinant) injection 1 mg, 1 mg, Intramuscular, PRN, Mariposa Dalton MD    glucose chewable tablet 16 g, 16 g, Oral, PRN, Mariposa Dalton MD    glucose chewable tablet 24 g, 24 g, Oral, PRN, Mariposa Dalton MD    metoclopramide HCl tablet 10 mg, 10 mg, Oral, TID AC, Gavino Cordoba MD, 10 mg at 09/03/19 1705    ondansetron injection 4 mg, 4 mg, Intravenous, Q6H, Mariposa Dalton MD, 4 mg at 09/04/19 0645    pantoprazole injection 40 mg, 40 mg, Intravenous, BID, Gvaino Cordoba MD, 40 mg at 09/04/19 0939    prochlorperazine injection Soln 10 mg, 10 mg, Intravenous, Q6H PRN, Mariposa Dalton MD, 10 mg at 09/02/19 2159    senna-docusate 8.6-50 mg per tablet 1 tablet, 1 tablet, Oral, BID PRN, Mariposa Dalton MD    sodium chloride 0.9% flush 5 mL, 5 mL, Intravenous, PRN, Mariposa Dalton MD    sucralfate 100 mg/mL suspension  0.5 g, 0.5 g, Oral, BID, Mariposa Dalton MD, 0.5 g at 09/04/19 0845    vancomycin (VANCOCIN) 500 mg in dextrose 5 % 100 mL IVPB, 500 mg, Intravenous, Once, Gavino Cordoba MD    sodium chloride, sodium chloride 0.9%, acetaminophen, Dextrose 10% Bolus, Dextrose 10% Bolus, glucagon (human recombinant), glucose, glucose, prochlorperazine, senna-docusate 8.6-50 mg, sodium chloride 0.9%    Laboratory/Diagnostic Data:  Reviewed and noted in plan where applicable- Please see chart for full lab data.    Recent Labs   Lab 09/02/19 0731 09/03/19  0307 09/04/19  0552   WBC 9.58 7.15 5.62   HGB 8.5* 7.1* 6.7*   HCT 25.7* 22.3* 20.6*    164 177       Recent Labs   Lab 09/02/19 0731 09/03/19  0307 09/04/19  0552    142 139   K 2.7* 4.2 4.0   CL 90* 113* 110   CO2 34* 19* 20*   BUN 45* 18 23*   CREATININE 11.3* 5.9* 8.3*   * 75 80   CALCIUM 9.5 9.0 8.9   MG 2.3 2.1 2.2   PHOS  --  1.8* 1.6*   LIPASE 43  --   --        Recent Labs   Lab 09/02/19 0731 09/03/19  0307 09/04/19  0552   ALKPHOS 198* 160* 171*   ALT 9* 12 11   AST 17 21 18   ALBUMIN 3.2* 2.5* 2.7*   PROT 7.6 6.0 6.3   BILITOT 0.4 0.3 0.4        Microbiology labs for the last week  Microbiology Results (last 7 days)     Procedure Component Value Units Date/Time    Blood culture #2 **CANNOT BE ORDERED STAT** [[id number]]  (Abnormal) Collected:  09/02/19 0732    Order Status:  Completed Specimen:  Blood from Peripheral, Antecubital, Left Updated:  09/04/19 1420     Blood Culture, Routine Gram stain aer bottle: Gram positive cocci in clusters resembling Staph       Results called to and read back by:Yomaira Stewart RN  09/03/2019  09:05      COAGULASE-NEGATIVE STAPHYLOCOCCUS SPECIES  Susceptibility testing not routinely performed.      Blood culture [[id number]] Collected:  09/03/19 0949    Order Status:  Completed Specimen:  Blood Updated:  09/04/19 1212     Blood Culture, Routine No Growth to date      No Growth to date    Blood culture [[id number]]  Collected:  09/03/19 0949    Order Status:  Completed Specimen:  Blood Updated:  09/04/19 1212     Blood Culture, Routine No Growth to date      No Growth to date    Blood culture #1 **CANNOT BE ORDERED STAT** [[id number]] Collected:  09/02/19 0711    Order Status:  Completed Specimen:  Blood from Peripheral, Antecubital, Left Updated:  09/04/19 1012     Blood Culture, Routine No Growth to date      No Growth to date      No Growth to date           Imaging Results          CTA Chest Abdomen Non Coronary (Final result)  Result time 09/02/19 10:41:09    Final result by Jessica Bowles MD (09/02/19 10:41:09)             Impression:      No acute findings.  No evidence of aortic aneurysm or dissection.    Cholelithiasis without evidence of acute cholecystitis.    Additional findings as above.    Electronically signed by resident: Redd Canales  Date:    09/02/2019  Time:    09:32    Electronically signed by: Jessica Bowles MD  Date:    09/02/2019  Time:    10:41           Narrative:    EXAMINATION:  CTA CHEST ABDOMEN NON CORONARY (XPD)    CLINICAL HISTORY:  Chest/back pain, acute, aortic dissection suspect;    TECHNIQUE:  CTA chest and abdomen performed before and after administration of 100 cc IV Omnipaque 350 as per dissection protocol. Coronal and sagittal reformats provided.    COMPARISON:  CT chest without from 12/05/2018.  CT abdomen/pelvis contrast from 04/03/2018.  Multiple additional prior examinations.    FINDINGS:  Thoracic soft tissues: Multiple normal sized axillary lymph nodes.  Thyroid gland appears unremarkable.    Aorta: Thoracic aorta normal in course and caliber.  No significant atherosclerotic plaque.  No evidence of aortic dissection or aneurysm.  Left-sided 3 vessel arch.    Heart: Normal in size. No pericardial effusion.    Katina/Mediastinum: Multiple normal sized mediastinal lymph nodes.    Lungs: Symmetrically expanded.  No focal consolidation or mass.  Small dependent pleural  calcifications on the left.  Calcified granuloma at the left lung base.  Micronodule right lung base, stable and benign.  No pneumothorax.  No significant pleural fluid.    Liver: Normal in size and attenuation.  No focal hepatic lesions.    Gallbladder: 1.8 cm calcified gallstone.  No wall thickening or pericholecystic fluid.    Bile Ducts: No evidence of dilated ducts.    Pancreas: No mass or peripancreatic fat stranding.    Spleen: Unremarkable.  Incidental note is made of a small splenule.    Adrenals: Stable nonspecific thickening of the bilateral adrenal glands.    Kidneys/ Ureters: Small and atrophic consistent with end-stage renal disease.  Numerous hypodensities are seen throughout both kidneys, largest consistent with simple cysts.  Bilateral punctate vascular calcifications versus small nonobstructing stones.  Adequate concentration of contrast.  No hydronephrosis or nephrolithiasis. No ureteral dilatation.    GI Tract/Mesentery: Small hiatal hernia. Moderate stool throughout the colon. No evidence of bowel obstruction or inflammation.    Peritoneal Space: No ascites. No free air.    Retroperitoneum:  Multiple normal sized periaortic lymph nodes.    Abdominal wall:  Unremarkable.    Vasculature: Abdominal aorta is normal course and caliber with mild atherosclerotic plaque.  No aneurysm or dissection.    Bones: No acute fracture.  Diffuse osseous sclerosis consistent with renal osteodystrophy. Age-appropriate degenerative changes.                             X-Ray Chest PA And Lateral (Final result)  Result time 09/02/19 08:08:44    Final result by Jatin Duncan MD (09/02/19 08:08:44)             Impression:      1. No acute radiographic findings in the chest.      Electronically signed by: Jatin Duncan MD  Date:    09/02/2019  Time:    08:08           Narrative:    EXAMINATION:  XR CHEST PA AND LATERAL    CLINICAL HISTORY:  Unspecified abdominal pain    TECHNIQUE:  PA and lateral views of the  "chest were performed.    COMPARISON:  Radiograph 08/19/2019.    FINDINGS:  Cardiac monitoring leads project over the bilateral hemithoraces.  Vascular stent projects over the right upper chest.  Mediastinal structures are midline. Hilar contours are unremarkable.  Cardiac silhouette is normal in size. Lung volumes are normal and symmetric. No consolidation. No pneumothorax or pleural effusions. No free air beneath the diaphragm. No acute osseous abnormalities.                              Estimated body mass index is 21.24 kg/m² as calculated from the following:    Height as of this encounter: 5' 6" (1.676 m).    Weight as of this encounter: 59.7 kg (131 lb 9.8 oz).    I & O (Last 24H):    Intake/Output Summary (Last 24 hours) at 9/4/2019 1517  Last data filed at 9/4/2019 1424  Gross per 24 hour   Intake 314 ml   Output no documentation   Net 314 ml       Estimated Creatinine Clearance: 8.5 mL/min (A) (based on SCr of 8.3 mg/dL (H)).    ASSESSMENT/PLAN:     Active Problems:      Active Hospital Problems    Diagnosis  POA    *Nausea and vomiting [R11.2] recurrent admissions 3rd admission in the last 2 weeks.  Status post gastroenterology evaluation. Status post GI evaluation.  Reglan dose increased to 10 mg t.i.d. with meals.  Continue with prochlorperazine for nausea as 2nd choice  recommended PPI BID for 8-12 weeks, H2 blocker at night, trial of sucralfate, scheduled Zofran, and outpatient gastric emptying study which has not been done yet.   He is currently not on opiates while in hospital, at home is prescribed Norco 5 q4h PRN.  Discontinued after admission    -recommend gastric emptying test while off opiates.  -dietary management              -eating small, low-fat, low-fiber meals 4-5 times/day              -copious non carbonated fluid intake              -may need referral to a dietitian            Bacteremia- Blood cultures from 09/02/2019 with Gram-positive cocci.   1 dose of vancomycin today and repeat " blood cultures.  Obtain vancomycin random in a.m..  Monitor for toxicity  09/04/2019    Vancomycin random at 21.  status post hemodialysis, blood cultures from 09/02/2019 1/2 with coagulase-negative Staphylococcus.  Likely contaminant.  Vancomycin discontinued  Unknown    Hypophosphatemia [E83.39] 1.8.  PhosLo discontinued follow-up with Nephrology  Unknown    Uremia [N19] ESRD.  Nephrology consulted continue with dialysis on Monday Wednesday Friday as per schedule    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, without long-term current use of insulin [E11.29]Currently controlled with diet with his last HgbA1c being 4.1.  Currently on renal diet.  Dietitian consulted as above  Yes    GERD with esophagitis [K21.0] as above    Abdominal pain- improved. WBC, lactic acid, lipase all within normal limits  -CT abdomen nonacute    Yes    Renovascular hypertension [I15.0] controlled with Coreg    Yes     Chronic    Hypokalemia [E87.6]Hypokalemia:  -potassium of 2.7 .  Replaced resolved to 4.2  -replete and monito  Unknown    Peripheral vascular disease in diabetes mellitus [E11.51]  Yes     Chronic    Dyslipidemia [E78.5] continue atorvastatin  Yes     Chronic    ESRD on hemodialysis [N18.6, Z99.2] nephrology consulted  Not Applicable     Chronic     MWF at [facility]      Anemia in chronic kidney disease [N18.9, D63.1] hemoglobin 8.5 on 09/02/2019 currently at 7.1.  Denies any hematemesis.  Monitor CBC     09/04/2019  Hemoglobin dropped to 6.7 .  Obtain fecal occult blood.  Denies vomiting .  Transfusion with 1 unit of packed RBC today.    Yes     Chronic    Secondary hyperparathyroidism of renal origin [N25.81]  Yes     Chronic      Resolved Hospital Problems   No resolved problems to display.         Disposition- home    DVT prophylaxis addressed with:  Bilateral SCDs            Gavino Cordoba MD  Attending Staff Physician  [facility]  pager- [phone] Pjanuyrwrbf - [phone]

## 2019-09-04 NOTE — PROGRESS NOTES
3.5hr HD treatment completed no fluid removed as ordered. 1U of PRBC given pt tolerated well. Both needles of a RICHARD fistula removed and pressure held until hemostasis achieved dressing applied.  Report given to ISMAEL Bell.

## 2019-09-04 NOTE — PROGRESS NOTES
[facility] NOTE    Vaughn Retana is a 54 y.o. male currently on hemodialysis for removal of uremic toxins and volume.     Patient seen and evaluated on hemodialysis, tolerating treatment, see HD flowsheet for vitals and assessments.    No Hypotension, chest pain, shortness of breath, cramping, nausea or vomiting.      Labs have been reviewed and the dialysate bath has been adjusted.    Labs:      Recent Labs   Lab 09/02/19  0731 09/03/19  0307 09/04/19  0552    142 139   K 2.7* 4.2 4.0   CL 90* 113* 110   CO2 34* 19* 20*   BUN 45* 18 23*   CREATININE 11.3* 5.9* 8.3*   CALCIUM 9.5 9.0 8.9   PHOS  --  1.8* 1.6*       Recent Labs   Lab 09/02/19  0731 09/03/19  0307 09/04/19  0552   WBC 9.58 7.15 5.62   HGB 8.5* 7.1* 6.7*   HCT 25.7* 22.3* 20.6*    164 177       Ultrafiltration goal is: 0 UF     Assessment/Plan:  - Seen on dialysis this afternoon, tolerating session with current UFR, no complications. Patient with decrease intake due to c/o nausea and vomiting. GI planning for a gastric emptying test.   - Will continue dialysis treatments while in-patient  - Continue to monitor intake and output, daily weights   - Avoid nephrotoxic medication and renal dose medications to GFR    Anemia of ESRD  - Hgb 6.7, receiving blood transfusion with HD    BMM  - Liberalize diet, renal restrictions removed  - Phos 1.6  - Consider IV phos replacements x 1 dose   - Daily renal function panel       Glenis Benavidez, ADILIA, APRN, FNP-C  Nephrology Department  Pager:  [phone]

## 2019-09-04 NOTE — PLAN OF CARE
09/04/19 1020   Post-Acute Status   Post-Acute Authorization Placement   Post-Acute Placement Status Awaiting Internal Medical Clearance     Patient not medically ready. SW will continue to follow up for the discharge plan.    Joselyn Rincon LMSW  [facility]   [phone]

## 2019-09-04 NOTE — PROGRESS NOTES
Report received from ISMAEL Bell. Pt arrived from floor AAOx4. Maintenance dialysis initiated via RICHARD fistula without difficulty.

## 2019-09-04 NOTE — PROGRESS NOTES
Pharmacokinetic Assessment Follow Up: IV Vancomycin    Vancomycin serum concentration assessment(s):   Random level 21.3 on 9/4/2019  Next Random level schedule before dialysis on Friday     Vancomycin Regimen Plan:  Patient is on dialysis M,W,F   500 mg vancomycin after today Dialysis     Drug levels (last 3 results):  Recent Labs   Lab Result Units 09/04/19  0551   Vancomycin, Random ug/mL 21.3       Pharmacy will continue to follow and monitor vancomycin.    Please contact pharmacy at extension [phone] for questions regarding this assessment.    Thank you for the consult,   Lizzy Roa       Patient brief summary:  Vaughn Retana is a 54 y.o. male initiated on antimicrobial therapy with IV Vancomycin for treatment of bacteremia    The patient's current regimen is 500 mg after dialysis    Drug Allergies:   Review of patient's allergies indicates:   Allergen Reactions    Fosrenol [lanthanum] Nausea And Vomiting     Nausea and vomiting       Actual Body Weight:   59.7 kg    Renal Function:   Estimated Creatinine Clearance: 8.5 mL/min (A) (based on SCr of 8.3 mg/dL (H)).,     Dialysis Method (if applicable):  intermittent HD    CBC (last 72 hours):  Recent Labs   Lab Result Units 09/02/19 0731 09/03/19  0307 09/04/19  0552   WBC K/uL 9.58 7.15 5.62   Hemoglobin g/dL 8.5* 7.1* 6.7*   Hematocrit % 25.7* 22.3* 20.6*   Platelets K/uL 209 164 177   Gran% % 70.5 55.6 54.0   Lymph% % 18.7 28.0 23.0   Mono% % 8.4 12.2 9.6   Eosinophil% % 1.7 3.6 12.5*   Basophil% % 0.4 0.3 0.5   Differential Method  Automated Automated Automated       Metabolic Panel (last 72 hours):  Recent Labs   Lab Result Units 09/02/19  0731 09/03/19  0307 09/04/19  0552   Sodium mmol/L 140 142 139   Potassium mmol/L 2.7* 4.2 4.0   Chloride mmol/L 90* 113* 110   CO2 mmol/L 34* 19* 20*   Glucose mg/dL 126* 75 80   BUN, Bld mg/dL 45* 18 23*   Creatinine mg/dL 11.3* 5.9* 8.3*   Albumin g/dL 3.2* 2.5* 2.7*   Total Bilirubin mg/dL 0.4 0.3 0.4    Alkaline Phosphatase U/L 198* 160* 171*   AST U/L 17 21 18   ALT U/L 9* 12 11   Magnesium mg/dL 2.3 2.1 2.2   Phosphorus mg/dL  --  1.8* 1.6*       Vancomycin Administrations:  vancomycin given in the last 96 hours                   vancomycin 1.25 g in dextrose 5% 250 mL IVPB (ready to mix) (mg) 1,250 mg New Bag 09/03/19 1054                Microbiologic Results:  Microbiology Results (last 7 days)     Procedure Component Value Units Date/Time    Blood culture #1 **CANNOT BE ORDERED STAT** [[id number]] Collected:  09/02/19 0711    Order Status:  Completed Specimen:  Blood from Peripheral, Antecubital, Left Updated:  09/04/19 1012     Blood Culture, Routine No Growth to date      No Growth to date      No Growth to date    Blood culture [[id number]] Collected:  09/03/19 0949    Order Status:  Completed Specimen:  Blood Updated:  09/03/19 1715     Blood Culture, Routine No Growth to date    Blood culture [[id number]] Collected:  09/03/19 0949    Order Status:  Completed Specimen:  Blood Updated:  09/03/19 1715     Blood Culture, Routine No Growth to date    Blood culture #2 **CANNOT BE ORDERED STAT** [[id number]] Collected:  09/02/19 0732    Order Status:  Completed Specimen:  Blood from Peripheral, Antecubital, Left Updated:  09/03/19 0905     Blood Culture, Routine Gram stain aer bottle: Gram positive cocci in clusters resembling Staph       Results called to and read back by:Yomaira Stewart RN  09/03/2019  09:05

## 2019-09-04 NOTE — NURSING
Safety maintained. NADN. Plan of care reviewed with pt. Denies nausea, appear to be dry heaving expelling clear liquid from mouth. Denies pain. Reviewed fall risk. Encouraged to call for assistance as needed. Bed in low position. Call bell in reach. Side rails up x2. Will continue to monitor.

## 2019-09-04 NOTE — PLAN OF CARE
Problem: Adult Inpatient Plan of Care  Goal: Plan of Care Review    Recommendations    1. Continue Renal diet.    - Encourage po intake.     2. If po intake <50%, recommend adding Novasource Renal.     3. RD following.     Goals: consume >75% of all meals  Nutrition Goal Status: new

## 2019-09-05 NOTE — PROGRESS NOTES
Progress Note  Hospital Medicine    Admit Date: 9/2/2019  Length of Stay:  LOS: 0 days     SUBJECTIVE:         Follow-up For:  Nausea and vomiting    HPI/Interval history (See H&P for complete P,F,SHx) :   lion Retana is a 54 y.o. male with PMH of ESRD (MWF), history of GI bleed/ erosive esophagitis, HTN , CVA/ intracerebral hemorrhage,  HFpEF, who presents to [facility] today with a chief complaint of abdominal pain. Patient reports his abdominal pain started 3 days ago without any inciting events.  The pain is located diffusely across his abdomen, is nonradiating.  It is sharp and cramping in quality, 8/10 in intensity.  He does not associated the pain with any aggravating or alleviating factors.  Patient does have associated nausea and vomiting for the past 3 days.  He reports having 3 episodes of nonbloody, nonbilious emesis over the last 3 days.  He has antiemetics prescribed at home, which he took yesterday but did not provide any relief from his nausea and vomiting.  Patient also complaints of constipation and his last bowel movement was 4 days ago.  Patient denies any melena, hematochezia, fever, chills, malaise, urinary urgency, frequency, dysuria, cough, sputum production, chest pain, shortness of breath.  Patient has been compliant with his dialysis reports last HD session was on Friday 8/30.  He also complains of feeling really weak and fatigued today.     While in the ED, patient is noted to be afebrile, hemodynamically stable, saturating 100% on room air.  No leukocytosis, WBC of 9.5.  H&H chronically low today 8.5/25.7.  Noted to be hypokalemic with potassium of 2.7.  BUN/creatinine of 45/11.3.  Alk-phos elevated to 198.  Troponin elevated to 0.173.  CT chest/abdomen done in the ED shows no acute findings, no evidence of aortic aneurysm or dissection.  Cholelithiasis without evidence of cholecystitis.  CXR nonacute.  Patient is due for his dialysis session today.  Nephrology was consulted for  ongoing HD.  Patient admitted to Hospital Medicine for further management.    09/03/2019  Blood cultures from 09/02/2019 with Gram-positive cocci.   1 dose of vancomycin today and repeat blood cultures.  Obtain vancomycin random in a.m..  Status post GI evaluation.  Reglan dose increased to 10 mg t.i.d. with meals.  Continue with prochlorperazine for nausea as 2nd choice  09/04/2019  Hemoglobin dropped to 6.7 .  Obtain fecal occult blood.  Denies vomiting .  Transfusion with 1 unit of packed RBC today.  Vancomycin random at 21.  status post hemodialysis, blood cultures from 09/02/2019 1/2 with coagulase-negative Staphylococcus.  Likely contaminant.  Vancomycin discontinued   09/05/2019  Hemoglobin at 8.4 status post 1 unit of pack RBC transfusion.  Scheduled for gastric emptying study on 09/06/2019    Review of Systems: List if applicable  Pain scale: 0/10  Constitutional: Negative for chills, diaphoresis, fatigue and fever.   HENT: Negative for sore throat.    Respiratory: Negative for chest tightness, shortness of breath and wheezing.    Cardiovascular: Negative for chest pain and palpitations.   Gastrointestinal: Positive for nausea and vomiting - improved Negative for abdominal distention, abdominal pain, anal bleeding and blood in stool.   Endocrine: Negative for cold intolerance and heat intolerance.   Genitourinary: Negative for hematuria.   Musculoskeletal: Negative for arthralgias and joint swelling.   Skin: Negative for pallor and rash.   Neurological: Negative for dizziness, weakness, light-headedness, numbness and headaches.   Psychiatric/Behavioral: Negative for hallucinations. The patient is not nervous/anxious.         OBJECTIVE:     Vital Signs Range (Last 24H):  Temp:  [96.6 °F (35.9 °C)-98.8 °F (37.1 °C)]   Pulse:  []   Resp:  [13-18]   BP: (105-145)/(58-93)   SpO2:  [100 %]     Physical Exam:  General- Patient alert and oriented x3   HEENT- PERRLA, EOMI, OP clear  Neck- No JVD,  Lymphadenopathy, Thyromegaly  CV- Regular rate and rhythm, No Murmur/cheryl/rubs  Resp- Lungs CTA Bilaterally, No increased WOB  Abdomen- Non tender/non-distended, BS normoactive x4 quads, no HSM  Extrem- No cyanosis, clubbing, edema. Right upper extremity AV fistula thrill present.  Left below-knee amputation (ambulates with prosthesis)  Skin- No rashes, lesions, ulcers  Neuro- Strength 5/5 flexors/extensors,Intact sensation to light touch grossly        Medications:  Medication list was reviewed and changes noted under Assessment/Plan.      Current Facility-Administered Medications:     0.9%  NaCl infusion (for blood administration), , Intravenous, Q24H PRN, Gavino Cordoba MD    0.9%  NaCl infusion, , Intravenous, PRN, Mariposa Dalton MD    [START ON 9/6/2019] 0.9%  NaCl infusion, , Intravenous, Once, Albino Goldstein MD    acetaminophen tablet 650 mg, 650 mg, Oral, Q8H PRN, Mariposa Dalton MD    atorvastatin tablet 80 mg, 80 mg, Oral, QHS, Mariposa Dalton MD, 80 mg at 09/04/19 2140    dextrose 10% (D10W) Bolus, 12.5 g, Intravenous, PRN, Mariposa Dalton MD    dextrose 10% (D10W) Bolus, 25 g, Intravenous, PRN, Mariposa Dalton MD    glucagon (human recombinant) injection 1 mg, 1 mg, Intramuscular, PRN, Mariposa Dalton MD    glucose chewable tablet 16 g, 16 g, Oral, PRN, Mariposa Dalton MD    glucose chewable tablet 24 g, 24 g, Oral, PRN, Mariposa Dalton MD    metoclopramide HCl tablet 10 mg, 10 mg, Oral, TID AC, Gavino Cordoba MD, 10 mg at 09/05/19 0553    ondansetron injection 4 mg, 4 mg, Intravenous, Q6H, Mariposa Dalton MD, 4 mg at 09/05/19 0553    pantoprazole injection 40 mg, 40 mg, Intravenous, BID, Gavino Cordoba MD, 40 mg at 09/05/19 0953    prochlorperazine injection Soln 10 mg, 10 mg, Intravenous, Q6H PRN, Mariposa Dalton MD, 10 mg at 09/02/19 5727    senna-docusate 8.6-50 mg per tablet 1 tablet, 1 tablet, Oral, BID PRN, Mariposa Dalton MD    sodium chloride  0.9% flush 5 mL, 5 mL, Intravenous, PRN, Mariposa Dalton MD    sucralfate 100 mg/mL suspension 0.5 g, 0.5 g, Oral, BID, Mariposa Dalton MD, 0.5 g at 09/04/19 2141    sodium chloride, sodium chloride 0.9%, acetaminophen, Dextrose 10% Bolus, Dextrose 10% Bolus, glucagon (human recombinant), glucose, glucose, prochlorperazine, senna-docusate 8.6-50 mg, sodium chloride 0.9%    Laboratory/Diagnostic Data:  Reviewed and noted in plan where applicable- Please see chart for full lab data.    Recent Labs   Lab 09/04/19  0552 09/04/19  1908 09/05/19  0819   WBC 5.62 7.11 6.03  6.03   HGB 6.7* 8.5* 8.4*  8.4*   HCT 20.6* 24.8* 25.6*  25.6*    157 161  161       Recent Labs   Lab 09/02/19  0731 09/03/19 0307 09/04/19  0552 09/05/19  0555    142 139 140   K 2.7* 4.2 4.0 3.5   CL 90* 113* 110 105   CO2 34* 19* 20* 23   BUN 45* 18 23* 8   CREATININE 11.3* 5.9* 8.3* 5.0*   * 75 80 77   CALCIUM 9.5 9.0 8.9 7.9*   MG 2.3 2.1 2.2 1.8   PHOS  --  1.8* 1.6* 2.9   LIPASE 43  --   --   --        Recent Labs   Lab 09/03/19 0307 09/04/19  0552 09/05/19  0555   ALKPHOS 160* 171* 176*   ALT 12 11 11   AST 21 18 22   ALBUMIN 2.5* 2.7* 2.7*   PROT 6.0 6.3 6.3   BILITOT 0.3 0.4 0.4        Microbiology labs for the last week  Microbiology Results (last 7 days)     Procedure Component Value Units Date/Time    Blood culture [[id number]] Collected:  09/03/19 0949    Order Status:  Completed Specimen:  Blood Updated:  09/05/19 1212     Blood Culture, Routine No Growth to date      No Growth to date      No Growth to date    Blood culture [[id number]] Collected:  09/03/19 0949    Order Status:  Completed Specimen:  Blood Updated:  09/05/19 1212     Blood Culture, Routine No Growth to date      No Growth to date      No Growth to date    Blood culture #1 **CANNOT BE ORDERED STAT** [[id number]] Collected:  09/02/19 0711    Order Status:  Completed Specimen:  Blood from Peripheral, Antecubital, Left Updated:  09/05/19 1012      Blood Culture, Routine No Growth to date      No Growth to date      No Growth to date      No Growth to date    Blood culture #2 **CANNOT BE ORDERED STAT** [[id number]]  (Abnormal) Collected:  09/02/19 0732    Order Status:  Completed Specimen:  Blood from Peripheral, Antecubital, Left Updated:  09/04/19 1420     Blood Culture, Routine Gram stain aer bottle: Gram positive cocci in clusters resembling Staph       Results called to and read back by:Yomaira Stewart RN  09/03/2019  09:05      COAGULASE-NEGATIVE STAPHYLOCOCCUS SPECIES  Susceptibility testing not routinely performed.             Imaging Results          CTA Chest Abdomen Non Coronary (Final result)  Result time 09/02/19 10:41:09    Final result by Jessica Bowles MD (09/02/19 10:41:09)             Impression:      No acute findings.  No evidence of aortic aneurysm or dissection.    Cholelithiasis without evidence of acute cholecystitis.    Additional findings as above.    Electronically signed by resident: Redd Canales  Date:    09/02/2019  Time:    09:32    Electronically signed by: Jessica Bowles MD  Date:    09/02/2019  Time:    10:41           Narrative:    EXAMINATION:  CTA CHEST ABDOMEN NON CORONARY (XPD)    CLINICAL HISTORY:  Chest/back pain, acute, aortic dissection suspect;    TECHNIQUE:  CTA chest and abdomen performed before and after administration of 100 cc IV Omnipaque 350 as per dissection protocol. Coronal and sagittal reformats provided.    COMPARISON:  CT chest without from 12/05/2018.  CT abdomen/pelvis contrast from 04/03/2018.  Multiple additional prior examinations.    FINDINGS:  Thoracic soft tissues: Multiple normal sized axillary lymph nodes.  Thyroid gland appears unremarkable.    Aorta: Thoracic aorta normal in course and caliber.  No significant atherosclerotic plaque.  No evidence of aortic dissection or aneurysm.  Left-sided 3 vessel arch.    Heart: Normal in size. No pericardial effusion.    Katina/Mediastinum: Multiple  normal sized mediastinal lymph nodes.    Lungs: Symmetrically expanded.  No focal consolidation or mass.  Small dependent pleural calcifications on the left.  Calcified granuloma at the left lung base.  Micronodule right lung base, stable and benign.  No pneumothorax.  No significant pleural fluid.    Liver: Normal in size and attenuation.  No focal hepatic lesions.    Gallbladder: 1.8 cm calcified gallstone.  No wall thickening or pericholecystic fluid.    Bile Ducts: No evidence of dilated ducts.    Pancreas: No mass or peripancreatic fat stranding.    Spleen: Unremarkable.  Incidental note is made of a small splenule.    Adrenals: Stable nonspecific thickening of the bilateral adrenal glands.    Kidneys/ Ureters: Small and atrophic consistent with end-stage renal disease.  Numerous hypodensities are seen throughout both kidneys, largest consistent with simple cysts.  Bilateral punctate vascular calcifications versus small nonobstructing stones.  Adequate concentration of contrast.  No hydronephrosis or nephrolithiasis. No ureteral dilatation.    GI Tract/Mesentery: Small hiatal hernia. Moderate stool throughout the colon. No evidence of bowel obstruction or inflammation.    Peritoneal Space: No ascites. No free air.    Retroperitoneum:  Multiple normal sized periaortic lymph nodes.    Abdominal wall:  Unremarkable.    Vasculature: Abdominal aorta is normal course and caliber with mild atherosclerotic plaque.  No aneurysm or dissection.    Bones: No acute fracture.  Diffuse osseous sclerosis consistent with renal osteodystrophy. Age-appropriate degenerative changes.                             X-Ray Chest PA And Lateral (Final result)  Result time 09/02/19 08:08:44    Final result by Jatin Duncan MD (09/02/19 08:08:44)             Impression:      1. No acute radiographic findings in the chest.      Electronically signed by: Jatin Duncan MD  Date:    09/02/2019  Time:    08:08           Narrative:   "  EXAMINATION:  XR CHEST PA AND LATERAL    CLINICAL HISTORY:  Unspecified abdominal pain    TECHNIQUE:  PA and lateral views of the chest were performed.    COMPARISON:  Radiograph 08/19/2019.    FINDINGS:  Cardiac monitoring leads project over the bilateral hemithoraces.  Vascular stent projects over the right upper chest.  Mediastinal structures are midline. Hilar contours are unremarkable.  Cardiac silhouette is normal in size. Lung volumes are normal and symmetric. No consolidation. No pneumothorax or pleural effusions. No free air beneath the diaphragm. No acute osseous abnormalities.                              Estimated body mass index is 21.24 kg/m² as calculated from the following:    Height as of this encounter: 5' 6" (1.676 m).    Weight as of this encounter: 59.7 kg (131 lb 9.8 oz).    I & O (Last 24H):    Intake/Output Summary (Last 24 hours) at 9/5/2019 1330  Last data filed at 9/4/2019 1650  Gross per 24 hour   Intake 1054 ml   Output 1069 ml   Net -15 ml       Estimated Creatinine Clearance: 14.1 mL/min (A) (based on SCr of 5 mg/dL (H)).    ASSESSMENT/PLAN:     Active Problems:      Active Hospital Problems    Diagnosis  POA    *Nausea and vomiting [R11.2] recurrent admissions 3rd admission in the last 2 weeks.  Status post gastroenterology evaluation. Status post GI evaluation.  Reglan dose increased to 10 mg t.i.d. with meals.  Continue with prochlorperazine for nausea as 2nd choice  recommended PPI BID for 8-12 weeks, H2 blocker at night, trial of sucralfate, scheduled Zofran, and outpatient gastric emptying study which has not been done yet.   He is currently not on opiates while in hospital, at home is prescribed Norco 5 q4h PRN.  Discontinued after admission    -recommend gastric emptying test while off opiates.  -dietary management              -eating small, low-fat, low-fiber meals 4-5 times/day              -copious non carbonated fluid intake              -may need referral to a " dietitian    09/05/2019  Hemoglobin at 8.4 status post 1 unit of pack RBC transfusion.  Scheduled for gastric emptying study on 09/06/2019            Bacteremia- Blood cultures from 09/02/2019 with Gram-positive cocci.   1 dose of vancomycin today and repeat blood cultures.  Obtain vancomycin random in a.m..  Monitor for toxicity  09/04/2019    Vancomycin random at 21.  status post hemodialysis, blood cultures from 09/02/2019 1/2 with coagulase-negative Staphylococcus.  Likely contaminant.  Vancomycin discontinued  Unknown    Hypophosphatemia [E83.39] resolved PhosLo discontinued follow-up with Nephrology.  Received sodium phosphate yesterday  Unknown    Uremia [N19] ESRD.  Nephrology consulted continue with dialysis on Monday Wednesday Friday as per schedule    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, without long-term current use of insulin [E11.29]Currently controlled with diet with his last HgbA1c being 4.1.  Currently on renal diet.  Dietitian consulted as above  Yes    GERD with esophagitis [K21.0] as above    Abdominal pain- improved. WBC, lactic acid, lipase all within normal limits  -CT abdomen nonacute    Yes    Renovascular hypertension [I15.0] controlled with Coreg    Yes     Chronic    Hypokalemia [E87.6]Hypokalemia:  -potassium of 2.7 .  Replaced resolved to 4.2  -replete and monito  Unknown    Peripheral vascular disease in diabetes mellitus [E11.51]  Yes     Chronic    Dyslipidemia [E78.5] continue atorvastatin  Yes     Chronic    ESRD on hemodialysis [N18.6, Z99.2] nephrology consulted  Not Applicable     Chronic     MWF at [facility]      Anemia in chronic kidney disease [N18.9, D63.1] hemoglobin 8.5 on 09/02/2019 currently at 7.1.  Denies any hematemesis.  Monitor CBC     09/04/2019  Hemoglobin dropped to 6.7 .  Obtain fecal occult blood.  Denies vomiting .  Transfusion with 1 unit of packed RBC today.  09/05/2019 hemoglobin stable at 8 point    Yes     Chronic     Secondary hyperparathyroidism of renal origin [N25.81]  Yes     Chronic      Resolved Hospital Problems   No resolved problems to display.         Disposition- home    DVT prophylaxis addressed with:  Bilateral SCDs            Gavino Cordoba MD  Attending Staff Physician  [facility]  pager- [phone] [id number] [phone]

## 2019-09-05 NOTE — NURSING
Rounding on patient, sitting on side of the bed, fully clothed, stated he was ready to go home. Patient reoriented to time. Refused telemetry to be replaced. Informed Med team, Phoenix Carmichael. Who is placing orders

## 2019-09-05 NOTE — NURSING
Patient scheduled to have gastric emptying today, however his diet was advanced from clear liquids to renal diet instead of NPO.  Had attempted to keep patient NPO after patient only had fluid intake at breakfast, however at lunch he received a renal diet tray and patient ate off the tray just prior to his procedure time.  [facility] notified, patient presently scheduled for NPO after midnight and the gastric emptying procedure for 9/6/19.  Patient denies any nausea or vomiting, although he does self suction and spits into container. Declines telemetry, symptom management medications.

## 2019-09-05 NOTE — PLAN OF CARE
Problem: Adult Inpatient Plan of Care  Goal: Plan of Care Review  Outcome: Ongoing (interventions implemented as appropriate)  POC reviewed with patient, who verbalized understanding. AAOx4. Delayed speech or tries to use gestures. Remains free of falls and injury. Right arm fistula intact with thrill/bruit.  VSS. L BKA with prosthetic at bedside.   Tolerating Full liquid diet, continues to have a lot of saliva, suction set up for this reason. States no Nausea. No Pain. RA without difficulty.   Patient states rarely making urine. 0 X BM.   Up with assist to and prosthetic. Telemetry monitor D/C after patient threw it across room.   TEDS SCDs refused  No acute events. No distress noted. Call bell in reach.   Will continue to monitor.

## 2019-09-05 NOTE — PROGRESS NOTES
Therapy with vancomycin complete and consult discontinued by provider.  Pharmacy will sign off, please re-consult as needed.    Thanks,      Ivan CaryD

## 2019-09-06 PROBLEM — R10.10 PAIN OF UPPER ABDOMEN: Status: RESOLVED | Noted: 2018-03-18 | Resolved: 2019-01-01

## 2019-09-06 PROBLEM — E87.6 HYPOKALEMIA: Status: RESOLVED | Noted: 2018-03-08 | Resolved: 2019-01-01

## 2019-09-06 PROBLEM — B19.20 HEPATITIS C: Status: ACTIVE | Noted: 2019-01-01

## 2019-09-06 PROBLEM — E83.39 HYPOPHOSPHATEMIA: Status: RESOLVED | Noted: 2019-01-01 | Resolved: 2019-01-01

## 2019-09-06 NOTE — TREATMENT PLAN
[facility]  Gastroenterology  Treatment plant      Assessment/Plan:     Vaughn Retana is a 54 y.o. male with  history of ESRD, h/o esophagitis, on chronic opiates, we are consulted for intractable nausea and vomiting. EGD 8/2 showed grade A esophagitis with no active bleeding and no outlet obstruction .No EGD was done given recent one at [facility] with grade A esophagitis. We had recommended PPI BID for 8-12 weeks, H2 blocker at night, trial of sucralfate, scheduled Zofran, and outpatient gastric emptying study which was not done so we suggested gastric emptying study while off opiates.     Hospital course:  We recommended QID metoclopramide at max 40 daily, as tolerated, patient started tolerating PO intake well, gastric emptying study showed 50% retention of ingested material at 4 hr consistent with delayed gastric emptying.       Problem List:  1. Intractable nausea vomiting with positive gastric emptying study.   2. esophagitis     Plan:  -recommend gastric emptying test while off opiates.  -continue PPI 40 daily  -correct fluid and electrolyte imbalances and nutritional deficiencies  -dietary management              -eating small, low-fat, low-fiber meals 4-5 times/day              -copious non carbonated fluid intake              -may need referral to a dietitian  -consider metoclopramide usual starting dose is 5 mg TID (30 minutes prior to meals and add at bedtime PRN), titrated up as tolerated to maximum of 40 mg/day, with addition of evening dose as indicated, Other prokinetic agents may include erythromycin.   -can use other antiemetic medications such as phenothiazines, antihistamines, or Zofran alone or in conjunction with prokinetic agents.   (Metoclopramide is FDA-approved for gastroparesis but has black box warning about risk for tardive dyskinesia.)        Thank you for involving us in the care of Vaughn Retana. Please call with any additional questions, concerns or changes  in the patient's clinical status.    Sotero Ojeda MD  Gastroenterology Fellow PGY IV   [facility]

## 2019-09-06 NOTE — NURSING
Patient NPO this am called [facility] for time of gastric emptying procedure.  [facility] able to  patient stat approx 0800 and was transported to [facility].  Dialysis also scheduled for today - will coordinate with unit when Patient returns after gastric emptying.

## 2019-09-06 NOTE — DISCHARGE SUMMARY
[facility] Hospital Medicine  Discharge Summary      Patient Name: Vaughn Retana  MRN: [id number]  Admission Date: 9/2/2019  Hospital Length of Stay: 0 days  Discharge Date and Time:  09/07/2019 4:57 AM  Attending Physician: Gavino Cordoba MD   Discharging Provider: Gavino Cordoba MD  Primary Care Provider: Yvette Zelaya NP    [facility] Medicine Team: [facility]P MED B Gavino Cordoba MD    HPI: Vaughn Retana is a 54 y.o. male with PMH of ESRD (MWF), history of GI bleed/ erosive esophagitis, HTN , CVA/ intracerebral hemorrhage,  HFpEF, who presents to [facility] today with a chief complaint of abdominal pain. Patient reports his abdominal pain started 3 days ago without any inciting events.  The pain is located diffusely across his abdomen, is nonradiating.  It is sharp and cramping in quality, 8/10 in intensity.  He does not associated the pain with any aggravating or alleviating factors.  Patient does have associated nausea and vomiting for the past 3 days.  He reports having 3 episodes of nonbloody, nonbilious emesis over the last 3 days.  He has antiemetics prescribed at home, which he took yesterday but did not provide any relief from his nausea and vomiting.  Patient also complaints of constipation and his last bowel movement was 4 days ago.  Patient denies any melena, hematochezia, fever, chills, malaise, urinary urgency, frequency, dysuria, cough, sputum production, chest pain, shortness of breath.  Patient has been compliant with his dialysis reports last HD session was on Friday 8/30.  He also complains of feeling really weak and fatigued today.     While in the ED, patient is noted to be afebrile, hemodynamically stable, saturating 100% on room air.  No leukocytosis, WBC of 9.5.  H&H chronically low today 8.5/25.7.  Noted to be hypokalemic with potassium of 2.7.  BUN/creatinine of 45/11.3.  Alk-phos elevated to 198.  Troponin elevated to 0.173.  CT chest/abdomen done in the  ED shows no acute findings, no evidence of aortic aneurysm or dissection.  Cholelithiasis without evidence of cholecystitis.  CXR nonacute.  Patient is due for his dialysis session today.  Nephrology was consulted for ongoing HD.  Patient admitted to Hospital Medicine for further management.     09/03/2019  Blood cultures from 09/02/2019 with Gram-positive cocci.   1 dose of vancomycin today and repeat blood cultures.  Obtain vancomycin random in a.m..  Status post GI evaluation.  Reglan dose increased to 10 mg t.i.d. with meals.  Continue with prochlorperazine for nausea as 2nd choice  09/04/2019  Hemoglobin dropped to 6.7 .  Obtain fecal occult blood.  Denies vomiting .  Transfusion with 1 unit of packed RBC today.  Vancomycin random at 21.  status post hemodialysis, blood cultures from 09/02/2019 1/2 with coagulase-negative Staphylococcus.  Likely contaminant.  Vancomycin discontinued   09/05/2019  Hemoglobin at 8.4 status post 1 unit of pack RBC transfusion.  Scheduled for gastric emptying study on 09/06/2019       * No surgery found *      Hospital Course:   Active Problems:     Active Hospital Problems     Diagnosis   POA    *Nausea and vomiting [R11.2] recurrent admissions 3rd admission in the last 2 weeks.  Status post gastroenterology evaluation. Status post GI evaluation.  Reglan dose increased to 10 mg t.i.d. with meals.  Continue with prochlorperazine for nausea as 2nd choice  recommended PPI BID for 8-12 weeks, H2 blocker at night, trial of sucralfate, scheduled Zofran, and outpatient gastric emptying study which has not been done yet.   He is currently not on opiates while in hospital, at home is prescribed Norco 5 q4h PRN.  Discontinued after admission     -recommend gastric emptying test while off opiates.  -dietary management              -eating small, low-fat, low-fiber meals 4-5 times/day              -copious non carbonated fluid intake              -may need referral to a  dietitian        09/05/2019  Hemoglobin at 8.4 status post 1 unit of pack RBC transfusion.  Scheduled for gastric emptying study on 09/06/2019 09/06/2019  Gastric emptying study showed delayed emptying.  Brown bomb enema x1 for constipation.       Hypotension after dialysis 09/06/2019 have systolic blood pressures 70s to 80s after dialysis discharge canceled.  Systolic blood pressure improved subsequently.  Discussed with Nephrology.  Recommending discharged on midodrine 5 mg 30 min before dialysis on dialysis days. carvedilol decreased to 3.125mg BID                 Bacteremia- Blood cultures from 09/02/2019 with Gram-positive cocci.   1 dose of vancomycin today and repeat blood cultures.  Obtain vancomycin random in a.m..  Monitor for toxicity  09/04/2019    Vancomycin random at 21.  status post hemodialysis, blood cultures from 09/02/2019 1/2 with coagulase-negative Staphylococcus.  Likely contaminant.  Vancomycin discontinued   Unknown    Hypophosphatemia [E83.39] resolved PhosLo discontinued follow-up with Nephrology.  Received sodium phosphate yesterday   Unknown    Uremia [N19] ESRD.  Nephrology consulted continue with dialysis on Monday Wednesday Friday as per schedule     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, without long-term current use of insulin [E11.29]Currently controlled with diet with his last HgbA1c being 4.1.  Currently on renal diet.  Dietitian consulted as above   Yes    GERD with esophagitis [K21.0] as above     Abdominal pain- improved. WBC, lactic acid, lipase all within normal limits  -CT abdomen nonacute     Yes    Renovascular hypertension [I15.0] controlled with Coreg      Yes       Chronic    Hypokalemia [E87.6]Hypokalemia:  -potassium of 2.7 .  Replaced resolved to 4.2  -replete and monito   Unknown    Peripheral vascular disease in diabetes mellitus [E11.51]   Yes       Chronic    Dyslipidemia [E78.5] continue atorvastatin   Yes       Chronic    ESRD on  hemodialysis [N18.6, Z99.2] nephrology consulted   Not Applicable       Chronic       MWF at [facility]       Anemia in chronic kidney disease [N18.9, D63.1] hemoglobin 8.5 on 09/02/2019 currently at 7.1.  Denies any hematemesis.  Monitor CBC      09/04/2019  Hemoglobin dropped to 6.7 .  Obtain fecal occult blood.  Denies vomiting .  Transfusion with 1 unit of packed RBC today.  09/05/2019 hemoglobin stable at 8 point      Yes       Chronic    Secondary hyperparathyroidism of renal origin [N25.81]   Yes       Chronic         Patient being discharged with Gastroenterology and primary care provider follow-up    Consults:   Consults (From admission, onward)        Status Ordering Provider     Inpatient consult to Gastroenterology  Once     Provider:  (Not yet assigned)    Completed JONO KNIGHT     Inpatient consult to Nephrology  Once     Provider:  (Not yet assigned)    Completed CLEO BAKER     Inpatient consult to Registered Dietitian/Nutritionist  Once     Provider:  (Not yet assigned)    Completed JONO KNIGHT          Final Active Diagnoses:    Diagnosis Date Noted POA    Hepatitis C [B19.20] 09/06/2019 Unknown    Uremia [N19] 09/02/2019 Yes    GERD with esophagitis [K21.0]  Yes    Abdominal pain [R10.9] 03/18/2018 Unknown    Renovascular hypertension [I15.0] 03/16/2018 Yes     Chronic    Peripheral vascular disease in diabetes mellitus [E11.51] 07/18/2013 Yes     Chronic    Dyslipidemia [E78.5] 02/07/2013 Yes     Chronic    ESRD on hemodialysis [N18.6, Z99.2] 02/07/2013 Not Applicable     Chronic    Anemia in chronic kidney disease [N18.9, D63.1] 09/17/2012 Yes     Chronic    Secondary hyperparathyroidism of renal origin [N25.81] 09/17/2012 Yes     Chronic      Problems Resolved During this Admission:    Diagnosis Date Noted Date Resolved POA    PRINCIPAL PROBLEM:  Nausea and vomiting [R11.2] 10/12/2014 09/06/2019 Yes    Hypophosphatemia [E83.39] 09/03/2019 09/06/2019 Yes     Hypokalemia [E87.6] 03/08/2018 09/06/2019 Yes      Discharged Condition: fair    Disposition: Home or Self Care    Follow Up:  Follow-up Information     Yvette Zelaya NP In 1 week.    Specialty:  Family Medicine  Contact information:  [address]  [facility] [id number]  [phone]                 Patient Instructions:      Ambulatory Referral to Gastroenterology   Referral Priority: Routine Referral Type: Consultation   Referral Reason: Specialty Services Required   Requested Specialty: Gastroenterology   Number of Visits Requested: 1     Medications:  Reconciled Home Medications:      Medication List      Start taking these medications    metoclopramide HCl 10 MG tablet  Commonly known as:  REGLAN  Take 1 tablet (10 mg total) by mouth 3 (three) times daily before meals. can be titrated up as tolerated to maximum of 40 mg/day, with addition of evening dose as indicated,     midodrine 5 MG Tab  Commonly known as:  PROAMATINE  Please take 30 min before dialysis on Monday Wednesday and Friday     ondansetron 8 MG tablet  Commonly known as:  ZOFRAN  Take 1 tablet (8 mg total) by mouth every 12 (twelve) hours as needed for Nausea.        Change how you take these medications    carvedilol 3.125 MG tablet  Commonly known as:  COREG  Take 1 tablet (3.125 mg total) by mouth 2 (two) times daily with meals.  What changed:    · medication strength  · how much to take        Continue taking these medications    acetaminophen 325 MG tablet  Commonly known as:  TYLENOL  Take 2 tablets (650 mg total) by mouth every 8 (eight) hours as needed.     pantoprazole 40 MG tablet  Commonly known as:  PROTONIX  Take 1 tablet (40 mg total) by mouth 2 (two) times daily before meals.     RENAPLEX-D 800 mcg-12.5 mg -2,000 unit Tab  Generic drug:  vit B,C-FA-zinc-selen-vit D3-E  Take 1 tablet by mouth once daily.     sevelamer carbonate 800 mg Tab  Commonly known as:  RENVELA  Take 2 tablets (1,600 mg total) by mouth 3 (three)  times daily with meals.     sucralfate 100 mg/mL suspension  Commonly known as:  CARAFATE  Take 5 mLs (500 mg total) by mouth 2 (two) times daily.        Stop taking these medications    HYDROcodone-acetaminophen 5-325 mg per tablet  Commonly known as:  NORCO            Significant Diagnostic Studies: Labs:   BMP:   Recent Labs   Lab 09/06/19  0731   GLU 80      K 3.8      CO2 25   BUN 14   CREATININE 7.9*   CALCIUM 8.8   MG 2.1   , CMP   Recent Labs   Lab 09/06/19  0731      K 3.8      CO2 25   GLU 80   BUN 14   CREATININE 7.9*   CALCIUM 8.8   PROT 6.5   ALBUMIN 2.7*   BILITOT 0.4   ALKPHOS 183*   AST 22   ALT 11   ANIONGAP 10   ESTGFRAFRICA 8.0*   EGFRNONAA 6.9*   , CBC   Recent Labs   Lab 09/05/19  1949 09/06/19 2002   WBC 7.31 9.66   HGB 8.9* 8.7*   HCT 26.5* 26.3*    209   , INR   Lab Results   Component Value Date    INR 1.1 08/25/2019    INR 1.1 08/23/2019    INR 1.1 08/22/2019   , Lipid Panel   Lab Results   Component Value Date    CHOL 111 (L) 06/22/2019    HDL 40 06/22/2019    LDLCALC 59.2 (L) 06/22/2019    TRIG 59 06/22/2019    CHOLHDL 36.0 06/22/2019   , Troponin   Recent Labs   Lab 09/03/19  0307   TROPONINI 0.129*   , A1C:   Recent Labs   Lab 06/22/19  0017   HGBA1C 4.7  4.7    and All labs within the past 24 hours have been reviewed  Microbiology:   Blood Culture   Lab Results   Component Value Date    LABBLOO No Growth to date 09/03/2019    LABBLOO No Growth to date 09/03/2019    LABBLOO No Growth to date 09/03/2019    LABBLOO No Growth to date 09/03/2019    LABBLOO No Growth to date 09/03/2019    LABBLOO No Growth to date 09/03/2019    LABBLOO No Growth to date 09/03/2019    LABBLOO No Growth to date 09/03/2019   , Sputum Culture   Lab Results   Component Value Date    GSRESP >10 epithelial cells per low power field 08/07/2015    GSRESP Rare WBC's 08/07/2015    GSRESP Moderate Gram positive cocci 08/07/2015    GSRESP Moderate Gram positive rods 08/07/2015    GSRESP  Few Gram negative rods 08/07/2015    GSRESP Rare yeast 08/07/2015    RESPIRATORYC Normal respiratory joy 08/07/2015    and Urine Culture    Lab Results   Component Value Date    LABURIN No growth 08/06/2015     Radiology:  Imaging Results          CTA Chest Abdomen Non Coronary (Final result)  Result time 09/02/19 10:41:09    Final result by Jessica Bowles MD (09/02/19 10:41:09)             Impression:      No acute findings.  No evidence of aortic aneurysm or dissection.    Cholelithiasis without evidence of acute cholecystitis.    Additional findings as above.    Electronically signed by resident: Redd Canales  Date:    09/02/2019  Time:    09:32    Electronically signed by: Jessica Bowles MD  Date:    09/02/2019  Time:    10:41           Narrative:    EXAMINATION:  CTA CHEST ABDOMEN NON CORONARY (XPD)    CLINICAL HISTORY:  Chest/back pain, acute, aortic dissection suspect;    TECHNIQUE:  CTA chest and abdomen performed before and after administration of 100 cc IV Omnipaque 350 as per dissection protocol. Coronal and sagittal reformats provided.    COMPARISON:  CT chest without from 12/05/2018.  CT abdomen/pelvis contrast from 04/03/2018.  Multiple additional prior examinations.    FINDINGS:  Thoracic soft tissues: Multiple normal sized axillary lymph nodes.  Thyroid gland appears unremarkable.    Aorta: Thoracic aorta normal in course and caliber.  No significant atherosclerotic plaque.  No evidence of aortic dissection or aneurysm.  Left-sided 3 vessel arch.    Heart: Normal in size. No pericardial effusion.    Katina/Mediastinum: Multiple normal sized mediastinal lymph nodes.    Lungs: Symmetrically expanded.  No focal consolidation or mass.  Small dependent pleural calcifications on the left.  Calcified granuloma at the left lung base.  Micronodule right lung base, stable and benign.  No pneumothorax.  No significant pleural fluid.    Liver: Normal in size and attenuation.  No focal hepatic  lesions.    Gallbladder: 1.8 cm calcified gallstone.  No wall thickening or pericholecystic fluid.    Bile Ducts: No evidence of dilated ducts.    Pancreas: No mass or peripancreatic fat stranding.    Spleen: Unremarkable.  Incidental note is made of a small splenule.    Adrenals: Stable nonspecific thickening of the bilateral adrenal glands.    Kidneys/ Ureters: Small and atrophic consistent with end-stage renal disease.  Numerous hypodensities are seen throughout both kidneys, largest consistent with simple cysts.  Bilateral punctate vascular calcifications versus small nonobstructing stones.  Adequate concentration of contrast.  No hydronephrosis or nephrolithiasis. No ureteral dilatation.    GI Tract/Mesentery: Small hiatal hernia. Moderate stool throughout the colon. No evidence of bowel obstruction or inflammation.    Peritoneal Space: No ascites. No free air.    Retroperitoneum:  Multiple normal sized periaortic lymph nodes.    Abdominal wall:  Unremarkable.    Vasculature: Abdominal aorta is normal course and caliber with mild atherosclerotic plaque.  No aneurysm or dissection.    Bones: No acute fracture.  Diffuse osseous sclerosis consistent with renal osteodystrophy. Age-appropriate degenerative changes.                             X-Ray Chest PA And Lateral (Final result)  Result time 09/02/19 08:08:44    Final result by Jatin Duncan MD (09/02/19 08:08:44)             Impression:      1. No acute radiographic findings in the chest.      Electronically signed by: Jatin Duncan MD  Date:    09/02/2019  Time:    08:08           Narrative:    EXAMINATION:  XR CHEST PA AND LATERAL    CLINICAL HISTORY:  Unspecified abdominal pain    TECHNIQUE:  PA and lateral views of the chest were performed.    COMPARISON:  Radiograph 08/19/2019.    FINDINGS:  Cardiac monitoring leads project over the bilateral hemithoraces.  Vascular stent projects over the right upper chest.  Mediastinal structures are midline.  Hilar contours are unremarkable.  Cardiac silhouette is normal in size. Lung volumes are normal and symmetric. No consolidation. No pneumothorax or pleural effusions. No free air beneath the diaphragm. No acute osseous abnormalities.                            Cardiac Graphics: Normal sinus rhythm  Incomplete right bundle branch block  Nonspecific T wave abnormality    Pending Diagnostic Studies:     Procedure Component Value Units Date/Time    CBC auto differential [[id number]]     Order Status:  Sent Lab Status:  No result     Specimen:  Blood     Comprehensive Metabolic Panel (CMP) [[id number]]     Order Status:  Sent Lab Status:  No result     Specimen:  Blood     Magnesium [[id number]]     Order Status:  Sent Lab Status:  No result     Specimen:  Blood     Phosphorus [[id number]]     Order Status:  Sent Lab Status:  No result     Specimen:  Blood         Indwelling Lines/Drains at time of discharge:   Lines/Drains/Airways     Drain                 Hemodialysis AV Fistula Right upper arm -- days                Gavino Cordoba MD  Department of Hospital Medicine  [facility]

## 2019-09-06 NOTE — PLAN OF CARE
Problem: Adult Inpatient Plan of Care  Goal: Plan of Care Review  Outcome: Ongoing (interventions implemented as appropriate)  POC reviewed with patient, who verbalized understanding. AAOx4.   Remains free of falls and injury. Delayed speech at times.   VSS. Tolerating Renal diet, remained NPO after midnight. No  Nausea or Vomiting, continue to have copious saliva . No Pain.   No Void. 0 X BM since the 8/31.   Up with assist and prosthesis. Rest throughout the night. Refused medication Zofran.   No acute events. No distress noted. Gasto testing today. Call bell in reach.   Will continue to monitor.

## 2019-09-06 NOTE — DISCHARGE INSTRUCTIONS
-dietary management              -eating small, low-fat, low-fiber meals 4-5 times/day              -copious non carbonated fluid intake

## 2019-09-06 NOTE — NURSING
Patient returned from [facility] about 1315.   Waiting for bedside dialysis.  Patient attempting to have bowel movement.  Denies any pain or nausea.

## 2019-09-06 NOTE — PLAN OF CARE
09/06/19 1621   Post-Acute Status   Post-Acute Authorization Placement;Other   Post-Acute Placement Status Set-up Complete     Patient is discharging today. NATTY spoke with patient sister Ms. Retana and she reports that she will be coming to [facility] to  patient at 6:00 pm.    Joselyn Rincon LMSW  [facility]   [phone]

## 2019-09-06 NOTE — PLAN OF CARE
to the bedside at this time to see the patient.  The patient is aware the case management team continues to follow throughout this admission for discharge needs and/or recommendations.   name and number remains on the board at the bedside for the patient or the     09/06/19 1530   Discharge Reassessment   Assessment Type Discharge Planning Reassessment   Provided patient/caregiver education on the expected discharge date and the discharge plan Yes   Do you have any problems affording any of your prescribed medications? No   Discharge Plan A Home   Discharge Plan B Home with family   DME Needed Upon Discharge    (TBD)   Patient choice form signed by patient/caregiver Yes   Anticipated Discharge Disposition Home   Can the patient answer the patient profile reliably? Yes, cognitively intact   How does the patient rate their overall health at the present time? Fair    family to call with discharge needs or questions.  Understanding verbalized.

## 2019-09-06 NOTE — PLAN OF CARE
Problem: Adult Inpatient Plan of Care  Goal: Plan of Care Review  Patient out of unit for gastric emptying test this am.  Returned to unit this afternoon and was scheduled for dialysis and discharge.  Still waiting on dialysis expected this evening.   Patient denied any pain or nausea and has refused medications to manage nausea and vomiting.  Patient was willing to eat dinner.  Patient found on toilet straining to have a bowel movement.  Attempted to digitally evacuate however patient unable to tolerate.  Called MD for Brown Bomb order. Patient expresses understanding of goal of care.

## 2019-09-07 NOTE — PROGRESS NOTES
Hemodialysis complete. System clotted with 27 minutes remaining. 1.17 liter net fluid removal. Blood returned slowly. Needles removed and pressre held x 5 minutes until hemostasis achieved. Gauze pressure dressing applied and secured with tape. Positive bruit/thrill noted.

## 2019-09-07 NOTE — PROGRESS NOTES
Progress Note  Hospital Medicine    Admit Date: 9/2/2019  Length of Stay:  LOS: 0 days     SUBJECTIVE:         Follow-up For:  Nausea and vomiting    HPI/Interval history (See H&P for complete P,F,SHx) :   lion Retana is a 54 y.o. male with PMH of ESRD (MWF), history of GI bleed/ erosive esophagitis, HTN , CVA/ intracerebral hemorrhage,  HFpEF, who [place] [facility] [place] with a chief complaint of abdominal pain. Patient reports his abdominal pain started 3 days ago without any inciting events.  The pain is located diffusely across his abdomen, is nonradiating.  It is sharp and cramping in quality, 8/10 in intensity.  He does not associated the pain with any aggravating or alleviating factors.  Patient does have associated nausea and vomiting for the past 3 days.  He reports having 3 episodes of nonbloody, nonbilious emesis over the last 3 days.  He has antiemetics prescribed at home, which he took yesterday but did not provide any relief from his nausea and vomiting.  Patient also complaints of constipation and his last bowel movement was 4 days ago.  Patient denies any melena, hematochezia, fever, chills, malaise, urinary urgency, frequency, dysuria, cough, sputum production, chest pain, shortness of breath.  Patient has been compliant with his dialysis reports last HD session was on Friday 8/30.  He also complains of feeling really weak and fatigued today.     While in the ED, patient is noted to be afebrile, hemodynamically stable, saturating 100% on room air.  No leukocytosis, WBC of 9.5.  H&H chronically low today 8.5/25.7.  Noted to be hypokalemic with potassium of 2.7.  BUN/creatinine of 45/11.3.  Alk-phos elevated to 198.  Troponin elevated to 0.173.  CT chest/abdomen done in the ED shows no acute findings, no evidence of aortic aneurysm or dissection.  Cholelithiasis without evidence of cholecystitis.  CXR nonacute.  Patient is due for his dialysis session today.  Nephrology was consulted for  ongoing HD.  Patient admitted to Hospital Medicine for further management.    09/03/2019  Blood cultures from 09/02/2019 with Gram-positive cocci.   1 dose of vancomycin today and repeat blood cultures.  Obtain vancomycin random in a.m..  Status post GI evaluation.  Reglan dose increased to 10 mg t.i.d. with meals.  Continue with prochlorperazine for nausea as 2nd choice  09/04/2019  Hemoglobin dropped to 6.7 .  Obtain fecal occult blood.  Denies vomiting .  Transfusion with 1 unit of packed RBC today.  Vancomycin random at 21.  status post hemodialysis, blood cultures from 09/02/2019 1/2 with coagulase-negative Staphylococcus.  Likely contaminant.  Vancomycin discontinued   09/05/2019  Hemoglobin at 8.4 status post 1 unit of pack RBC transfusion.  Scheduled for gastric emptying study on 09/06/2019-   09/06/2019  Gastric emptying study showed delayed emptying.  Brown bomb enema x1 for constipation.  On to have systolic blood pressures 70s to 80s after dialysis discharge canceled.  Systolic blood pressure improved subsequently    Review of Systems: List if applicable  Pain scale: 0/10  Constitutional: Negative for chills, diaphoresis, fatigue and fever.   HENT: Negative for sore throat.    Respiratory: Negative for chest tightness, shortness of breath and wheezing.    Cardiovascular: Negative for chest pain and palpitations.   Gastrointestinal: Positive for nausea and vomiting - improved Negative for abdominal distention, abdominal pain, anal bleeding and blood in stool.   Endocrine: Negative for cold intolerance and heat intolerance.   Genitourinary: Negative for hematuria.   Musculoskeletal: Negative for arthralgias and joint swelling.   Skin: Negative for pallor and rash.   Neurological: Negative for dizziness, weakness, light-headedness, numbness and headaches.   Psychiatric/Behavioral: Negative for hallucinations. The patient is not nervous/anxious.         OBJECTIVE:     Vital Signs Range (Last 24H):  Temp:   [97 °F (36.1 °C)-98.4 °F (36.9 °C)]   Pulse:  []   Resp:  [16-24]   BP: ()/(45-88)   SpO2:  [100 %]     Physical Exam:  General- Patient alert and oriented x3   HEENT- PERRLA, EOMI, OP clear  Neck- No JVD, Lymphadenopathy, Thyromegaly  CV- Regular rate and rhythm, No Murmur/cheryl/rubs  Resp- Lungs CTA Bilaterally, No increased WOB  Abdomen- Non tender/non-distended, BS normoactive x4 quads, no HSM  Extrem- No cyanosis, clubbing, edema. Right upper extremity AV fistula thrill present.  Left below-knee amputation (ambulates with prosthesis)  Skin- No rashes, lesions, ulcers  Neuro- Strength 5/5 flexors/extensors,Intact sensation to light touch grossly        Medications:  Medication list was reviewed and changes noted under Assessment/Plan.      Current Facility-Administered Medications:     0.9%  NaCl infusion (for blood administration), , Intravenous, Q24H PRN, Gavino Cordoba MD    0.9%  NaCl infusion, , Intravenous, PRN, Mariposa Dalton MD    0.9%  NaCl infusion, , Intravenous, Once, Albino Goldstein MD    acetaminophen tablet 650 mg, 650 mg, Oral, Q8H PRN, Mariposa Dalton MD    atorvastatin tablet 80 mg, 80 mg, Oral, QHS, Mariposa Dalton MD, 80 mg at 09/05/19 2026    dextrose 10% (D10W) Bolus, 12.5 g, Intravenous, PRN, Mariposa Dalton MD    dextrose 10% (D10W) Bolus, 25 g, Intravenous, PRN, Mariposa Dalton MD    glucagon (human recombinant) injection 1 mg, 1 mg, Intramuscular, PRN, Mariposa Dalton MD    glucose chewable tablet 16 g, 16 g, Oral, PRN, Mariposa Dalton MD    glucose chewable tablet 24 g, 24 g, Oral, PRN, Mariposa Dalton MD    metoclopramide HCl tablet 10 mg, 10 mg, Oral, TID AC, Gavino Cordoba MD, 10 mg at 09/06/19 0614    ondansetron injection 4 mg, 4 mg, Intravenous, Q12H, Gavino Cordoba MD    pantoprazole injection 40 mg, 40 mg, Intravenous, BID, Gavino Cordoba MD, 40 mg at 09/05/19 2026    prochlorperazine injection Soln 10 mg, 10 mg,  Intravenous, Q6H PRN, Mariposa Dalton MD, 10 mg at 09/02/19 2159    senna-docusate 8.6-50 mg per tablet 1 tablet, 1 tablet, Oral, BID PRN, Mariposa Dalton MD    sodium chloride 0.9% flush 5 mL, 5 mL, Intravenous, PRN, Mariposa Dalton MD    sucralfate 100 mg/mL suspension 0.5 g, 0.5 g, Oral, BID, Mariposa Dalton MD, 0.5 g at 09/05/19 2026    sodium chloride, sodium chloride 0.9%, acetaminophen, Dextrose 10% Bolus, Dextrose 10% Bolus, glucagon (human recombinant), glucose, glucose, prochlorperazine, senna-docusate 8.6-50 mg, sodium chloride 0.9%    Laboratory/Diagnostic Data:  Reviewed and noted in plan where applicable- Please see chart for full lab data.    Recent Labs   Lab 09/05/19 0819 09/05/19 1949 09/06/19 2002   WBC 6.03  6.03 7.31 9.66   HGB 8.4*  8.4* 8.9* 8.7*   HCT 25.6*  25.6* 26.5* 26.3*     161 178 209       Recent Labs   Lab 09/02/19 0731  09/04/19 0552 09/05/19 0555 09/06/19  0731      < > 139 140 138   K 2.7*   < > 4.0 3.5 3.8   CL 90*   < > 110 105 103   CO2 34*   < > 20* 23 25   BUN 45*   < > 23* 8 14   CREATININE 11.3*   < > 8.3* 5.0* 7.9*   *   < > 80 77 80   CALCIUM 9.5   < > 8.9 7.9* 8.8   MG 2.3   < > 2.2 1.8 2.1   PHOS  --    < > 1.6* 2.9 4.1   LIPASE 43  --   --   --   --     < > = values in this interval not displayed.       Recent Labs   Lab 09/04/19 0552 09/05/19 0555 09/06/19  0731   ALKPHOS 171* 176* 183*   ALT 11 11 11   AST 18 22 22   ALBUMIN 2.7* 2.7* 2.7*   PROT 6.3 6.3 6.5   BILITOT 0.4 0.4 0.4        Microbiology labs for the last week  Microbiology Results (last 7 days)     Procedure Component Value Units Date/Time    Blood culture [[id number]] Collected:  09/03/19 0949    Order Status:  Completed Specimen:  Blood Updated:  09/06/19 1212     Blood Culture, Routine No Growth to date      No Growth to date      No Growth to date      No Growth to date    Blood culture [[id number]] Collected:  09/03/19 0949    Order Status:  Completed Specimen:   Blood Updated:  09/06/19 1212     Blood Culture, Routine No Growth to date      No Growth to date      No Growth to date      No Growth to date    Blood culture #1 **CANNOT BE ORDERED STAT** [[id number]] Collected:  09/02/19 0711    Order Status:  Completed Specimen:  Blood from Peripheral, Antecubital, Left Updated:  09/06/19 1012     Blood Culture, Routine No Growth to date      No Growth to date      No Growth to date      No Growth to date      No Growth to date    Blood culture #2 **CANNOT BE ORDERED STAT** [[id number]]  (Abnormal) Collected:  09/02/19 0732    Order Status:  Completed Specimen:  Blood from Peripheral, Antecubital, Left Updated:  09/04/19 1420     Blood Culture, Routine Gram stain aer bottle: Gram positive cocci in clusters resembling Staph       Results called to and read back by:Yomaira Stewart RN  09/03/2019  09:05      COAGULASE-NEGATIVE STAPHYLOCOCCUS SPECIES  Susceptibility testing not routinely performed.             Imaging Results          CTA Chest Abdomen Non Coronary (Final result)  Result time 09/02/19 10:41:09    Final result by Jessica Bowles MD (09/02/19 10:41:09)             Impression:      No acute findings.  No evidence of aortic aneurysm or dissection.    Cholelithiasis without evidence of acute cholecystitis.    Additional findings as above.    Electronically signed by resident: Redd Canales  Date:    09/02/2019  Time:    09:32    Electronically signed by: Jessica Bowles MD  Date:    09/02/2019  Time:    10:41           Narrative:    EXAMINATION:  CTA CHEST ABDOMEN NON CORONARY (XPD)    CLINICAL HISTORY:  Chest/back pain, acute, aortic dissection suspect;    TECHNIQUE:  CTA chest and abdomen performed before and after administration of 100 cc IV Omnipaque 350 as per dissection protocol. Coronal and sagittal reformats provided.    COMPARISON:  CT chest without from 12/05/2018.  CT abdomen/pelvis contrast from 04/03/2018.  Multiple additional prior  examinations.    FINDINGS:  Thoracic soft tissues: Multiple normal sized axillary lymph nodes.  Thyroid gland appears unremarkable.    Aorta: Thoracic aorta normal in course and caliber.  No significant atherosclerotic plaque.  No evidence of aortic dissection or aneurysm.  Left-sided 3 vessel arch.    Heart: Normal in size. No pericardial effusion.    Katina/Mediastinum: Multiple normal sized mediastinal lymph nodes.    Lungs: Symmetrically expanded.  No focal consolidation or mass.  Small dependent pleural calcifications on the left.  Calcified granuloma at the left lung base.  Micronodule right lung base, stable and benign.  No pneumothorax.  No significant pleural fluid.    Liver: Normal in size and attenuation.  No focal hepatic lesions.    Gallbladder: 1.8 cm calcified gallstone.  No wall thickening or pericholecystic fluid.    Bile Ducts: No evidence of dilated ducts.    Pancreas: No mass or peripancreatic fat stranding.    Spleen: Unremarkable.  Incidental note is made of a small splenule.    Adrenals: Stable nonspecific thickening of the bilateral adrenal glands.    Kidneys/ Ureters: Small and atrophic consistent with end-stage renal disease.  Numerous hypodensities are seen throughout both kidneys, largest consistent with simple cysts.  Bilateral punctate vascular calcifications versus small nonobstructing stones.  Adequate concentration of contrast.  No hydronephrosis or nephrolithiasis. No ureteral dilatation.    GI Tract/Mesentery: Small hiatal hernia. Moderate stool throughout the colon. No evidence of bowel obstruction or inflammation.    Peritoneal Space: No ascites. No free air.    Retroperitoneum:  Multiple normal sized periaortic lymph nodes.    Abdominal wall:  Unremarkable.    Vasculature: Abdominal aorta is normal course and caliber with mild atherosclerotic plaque.  No aneurysm or dissection.    Bones: No acute fracture.  Diffuse osseous sclerosis consistent with renal osteodystrophy.  "Age-appropriate degenerative changes.                             X-Ray Chest PA And Lateral (Final result)  Result time 09/02/19 08:08:44    Final result by Jatin Duncan MD (09/02/19 08:08:44)             Impression:      1. No acute radiographic findings in the chest.      Electronically signed by: Jatin Duncan MD  Date:    09/02/2019  Time:    08:08           Narrative:    EXAMINATION:  XR CHEST PA AND LATERAL    CLINICAL HISTORY:  Unspecified abdominal pain    TECHNIQUE:  PA and lateral views of the chest were performed.    COMPARISON:  Radiograph 08/19/2019.    FINDINGS:  Cardiac monitoring leads project over the bilateral hemithoraces.  Vascular stent projects over the right upper chest.  Mediastinal structures are midline. Hilar contours are unremarkable.  Cardiac silhouette is normal in size. Lung volumes are normal and symmetric. No consolidation. No pneumothorax or pleural effusions. No free air beneath the diaphragm. No acute osseous abnormalities.                              Estimated body mass index is 21.24 kg/m² as calculated from the following:    Height as of this encounter: 5' 6" (1.676 m).    Weight as of this encounter: 59.7 kg (131 lb 9.8 oz).    I & O (Last 24H):    Intake/Output Summary (Last 24 hours) at 9/7/2019 0808  Last data filed at 9/6/2019 2233  Gross per 24 hour   Intake 500 ml   Output 1670 ml   Net -1170 ml       Estimated Creatinine Clearance: 8.9 mL/min (A) (based on SCr of 7.9 mg/dL (H)).    ASSESSMENT/PLAN:     Active Problems:      Active Hospital Problems    Diagnosis  POA    *Nausea and vomiting [R11.2] recurrent admissions 3rd admission in the last 2 weeks.  Status post gastroenterology evaluation. Status post GI evaluation.  Reglan dose increased to 10 mg t.i.d. with meals.  Continue with prochlorperazine for nausea as 2nd choice  recommended PPI BID for 8-12 weeks, H2 blocker at night, trial of sucralfate, scheduled Zofran, and outpatient gastric emptying study " which has not been done yet.   He is currently not on opiates while in hospital, at home is prescribed Norco 5 q4h PRN.  Discontinued after admission    -recommend gastric emptying test while off opiates.  -dietary management              -eating small, low-fat, low-fiber meals 4-5 times/day              -copious non carbonated fluid intake              -may need referral to a dietitian      09/05/2019  Hemoglobin at 8.4 status post 1 unit of pack RBC transfusion.  Scheduled for gastric emptying study on 09/06/2019 09/06/2019  Gastric emptying study showed delayed emptying.  Brown bomb enema x1 for constipation.      Hypotension after dialysis 09/06/2019 have systolic blood pressures 70s to 80s after dialysis discharge canceled.  Systolic blood pressure improved subsequently.  Discussed with Nephrology.  Recommending discharged on midodrine 5 mg 30 min before dialysis on dialysis days            Bacteremia- Blood cultures from 09/02/2019 with Gram-positive cocci.   1 dose of vancomycin today and repeat blood cultures.  Obtain vancomycin random in a.m..  Monitor for toxicity  09/04/2019    Vancomycin random at 21.  status post hemodialysis, blood cultures from 09/02/2019 1/2 with coagulase-negative Staphylococcus.  Likely contaminant.  Vancomycin discontinued  Unknown    Hypophosphatemia [E83.39] resolved PhosLo discontinued follow-up with Nephrology.  Received sodium phosphate yesterday  Unknown    Uremia [N19] ESRD.  Nephrology consulted continue with dialysis on Monday Wednesday Friday as per schedule    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, without long-term current use of insulin [E11.29]Currently controlled with diet with his last HgbA1c being 4.1.  Currently on renal diet.  Dietitian consulted as above  Yes    GERD with esophagitis [K21.0] as above    Abdominal pain- improved. WBC, lactic acid, lipase all within normal limits  -CT abdomen nonacute    Yes    Renovascular  hypertension [I15.0] controlled with Coreg    Yes     Chronic    Hypokalemia [E87.6]Hypokalemia:  -potassium of 2.7 .  Replaced resolved to 4.2  -replete and monito  Unknown    Peripheral vascular disease in diabetes mellitus [E11.51]  Yes     Chronic    Dyslipidemia [E78.5] continue atorvastatin  Yes     Chronic    ESRD on hemodialysis [N18.6, Z99.2] nephrology consulted  Not Applicable     Chronic     MWF at [facility]      Anemia in chronic kidney disease [N18.9, D63.1] hemoglobin 8.5 on 09/02/2019 currently at 7.1.  Denies any hematemesis.  Monitor CBC     09/04/2019  Hemoglobin dropped to 6.7 .  Obtain fecal occult blood.  Denies vomiting .  Transfusion with 1 unit of packed RBC today.  09/05/2019 hemoglobin stable at 8 point    Yes     Chronic    Secondary hyperparathyroidism of renal origin [N25.81]  Yes     Chronic      Resolved Hospital Problems   No resolved problems to display.         Disposition- home    DVT prophylaxis addressed with:  Bilateral SCDs            Gavino Cordoba MD  Attending Staff Physician  Hospital Medicine  pager- [phone]  [employer] - [phone]

## 2019-09-07 NOTE — CARE UPDATE
"RAPID RESPONSE NURSE PROACTIVE ROUNDING NOTE     Time of Visit: 2330    Admit Date: 2019  LOS: 0  Code Status: Full Code   Date of Visit: 2019  : 1965  Age: 54 y.o.  Sex: male  Race: Black or   Bed: OBS 3091/OBS 3091A:   MRN: [id number]  Was the patient discharged from an ICU this admission? no   Was the patient discharged from a PACU within last 24 hours?  no  Did the patient receive conscious sedation/general anesthesia in last 24 hours?  no  Was the patient in the ED within the past 24 hours?  no  Was the patient started on NIPPV within the past 24 hours?  no  Attending Physician: Gavino Cordoba MD  [place] [facility]    ASSESSMENT     Diagnosis: Nausea and vomiting    Abnormal Vital Signs: BP (!) 85/54   Pulse (!) 117   Temp 98.1 °F (36.7 °C) (Axillary)   Resp 20   Ht 5' 6" (1.676 m) Comment: per RN   Wt 59.7 kg (131 lb 9.8 oz)   SpO2 100%   BMI 21.24 kg/m²      Clinical Issues: Circulatory    Patient  has a past medical history of Amputation stump pain, Aspiration pneumonia, Asterixis, C. 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    Proactive rounding for MEWS 5 hypotensive 71/51 (57) recheck 85/54 (65).  Prior to dialysis starting SBP " 130's-150's. Pt has discharge order post HD.  Spoke with pt and explained the dangers of leaving while BP low. Pt sister at [facility] convinced pt to stay until BP improved.      INTERVENTIONS/ RECOMMENDATIONS     Monitor BP    Discussed plan of care with RNKate.    PHYSICIAN ESCALATION     Yes/No  yes    Orders received and case discussed with PRATIK Carmichael.    Disposition: Remain in room 3091.    FOLLOW-UP     Call back the Rapid Response Nurse, Bere Barcenas RN at [phone] for additional questions or concerns.

## 2019-09-07 NOTE — PLAN OF CARE
Problem: Adult Inpatient Plan of Care  Goal: Plan of Care Review  Outcome: Ongoing (interventions implemented as appropriate)  Patient  AAOX4 Vital signs stable. Safety and infection precautions maintained. Patient is comfortable at this time,bed is locked and in a low position with call light in reach. Will Cont to monitor

## 2019-09-20 PROBLEM — K31.84 GASTROPARESIS: Status: ACTIVE | Noted: 2019-01-01

## 2019-09-20 NOTE — SUBJECTIVE & OBJECTIVE
Past Medical History:   Diagnosis Date    Amputation stump pain 9/10/2013    Aspiration pneumonia 7/27/2015    Asterixis 11/8/2016    C. difficile colitis 8/7/2015    Cholelithiasis without obstruction 8/25/2015    Chronic diastolic heart failure     2-23-17   1 - Low normal to mildly depressed left ventricular systolic function (EF 50-55%).    2 - Right ventricular enlargement with mildly depressed systolic function.    3 - Left ventricular diastolic dysfunction.    4 - Right atrial enlargement.    5 - Severe tricuspid regurgitation.    6 - Pulmonary hypertension. The estimated PA systolic pressure is 86 mmHg.    7 - Increased central venous pressure.    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    Metabolic acidosis, IAG, reduced excretion of inorganic acids     Myoclonic jerking 9/20/2016    Noncompliance with medication regimen 12/4/2018    Secondary hyperparathyroidism (of renal origin)     Secondary pulmonary hypertension 3/23/2017    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    TB lung, latent 08/25/2015    Negative Quantiferon Gold 3-23-17       Past Surgical History:   Procedure Laterality Date    AMPUTATION, BELOW KNEE Left 12/18/2013    Performed by Elgin Houston MD at [facility] OR 1ST FLR    COLONOSCOPY      COLONOSCOPY N/A 4/4/2017    Performed by Walker Stern MD at [facility] ENDO (2ND FLR)    EGD (ESOPHAGOGASTRODUODENOSCOPY) N/A 3/7/2019    Performed by  Man Galicia MD at [facility] ENDO (2ND FLR)    EGD (ESOPHAGOGASTRODUODENOSCOPY) N/A 6/12/2018    Performed by Man Galicia MD at [facility] ENDO (2ND FLR)    ESOPHAGOGASTRODUODENOSCOPY (EGD) N/A 5/22/2018    Performed by Ke Sparks MD at [facility] ENDO (2ND FLR)    ESOPHAGOGASTRODUODENOSCOPY (EGD) N/A 3/16/2018    Performed by Kevin De La Paz MD at [facility] ENDO (2ND FLR)    ESOPHAGOGASTRODUODENOSCOPY (EGD) N/A 3/8/2018    Performed by Man Galicia MD at [facility] ENDO (2ND FLR)    ESOPHAGOGASTRODUODENOSCOPY (EGD) N/A 4/4/2017    Performed by Walker Stern MD at [facility] ENDO (2ND FLR)    ESOPHAGOGASTRODUODENOSCOPY (EGD) N/A 10/17/2014    Performed by Man Galicia MD at [facility] (2ND FLR)    FISTULOGRAM Right 9/18/2014    Performed by Grayson Hubbard MD at [facility] CATH LAB    FOOT AMPUTATION THROUGH METATARSAL      left foot    LEG AMPUTATION THROUGH KNEE  12/18/2013    left BKA    R AVF  9/12/12    UPPER GASTROINTESTINAL ENDOSCOPY         Review of patient's allergies indicates:   Allergen Reactions    Fosrenol [lanthanum] Nausea And Vomiting     Nausea and vomiting       No current facility-administered medications on file prior to encounter.      Current Outpatient Medications on File Prior to Encounter   Medication Sig    acetaminophen (TYLENOL) 325 MG tablet Take 2 tablets (650 mg total) by mouth every 8 (eight) hours as needed.    carvedilol (COREG) 3.125 MG tablet Take 1 tablet (3.125 mg total) by mouth 2 (two) times daily with meals.    metoclopramide HCl (REGLAN) 10 MG tablet Take 1 tablet (10 mg total) by mouth 3 (three) times daily before meals. can be titrated up as tolerated to maximum of 40 mg/day, with addition of evening dose as indicated,    midodrine (PROAMATINE) 5 MG Tab Please take 30 min before dialysis on Monday Wednesday and Friday    ondansetron (ZOFRAN) 8 MG tablet Take 1 tablet (8 mg total) by mouth every 12 (twelve) hours as needed for Nausea.    pantoprazole (PROTONIX) 40 MG  tablet Take 1 tablet (40 mg total) by mouth 2 (two) times daily before meals.    RENAPLEX-D 800 mcg-12.5 mg -2,000 unit Tab Take 1 tablet by mouth once daily.    sevelamer carbonate (RENVELA) 800 mg Tab Take 2 tablets (1,600 mg total) by mouth 3 (three) times daily with meals.    sucralfate (CARAFATE) 100 mg/mL suspension Take 5 mLs (500 mg total) by mouth 2 (two) times daily.     Family History     Problem Relation (Age of Onset)    Alcohol abuse Maternal Grandmother    Diabetes Brother, Maternal Grandfather    Early death Mother    Heart disease Father    Hyperlipidemia Father    Hypertension Father, Sister    Kidney disease Father        Tobacco Use    Smoking status: Former Smoker     Packs/day: 1.00     Years: 10.00     Pack years: 10.00    Smokeless tobacco: Never Used   Substance and Sexual Activity    Alcohol use: No    Drug use: No    Sexual activity: Yes     Partners: Female     Birth control/protection: None     Review of Systems   Constitutional: Positive for activity change, appetite change and unexpected weight change. Negative for chills, diaphoresis, fatigue and fever.   Eyes: Negative for photophobia and visual disturbance.   Respiratory: Negative for cough, shortness of breath, wheezing and stridor.    Cardiovascular: Negative for chest pain, palpitations and leg swelling.   Gastrointestinal: Positive for abdominal pain (epigastric), diarrhea, nausea and vomiting. Negative for blood in stool and constipation.   Endocrine: Negative for cold intolerance and heat intolerance.   Genitourinary: Positive for difficulty urinating (oliguric).   Skin: Negative for rash and wound.   Allergic/Immunologic: Negative for immunocompromised state.   Neurological: Negative for dizziness, seizures, syncope, weakness, light-headedness and headaches.   Psychiatric/Behavioral: Negative for agitation, behavioral problems and confusion.     Objective:     Vital Signs (Most Recent):  Temp: 97.8 °F (36.6 °C)  (09/20/19 0843)  Pulse: 80 (09/20/19 1402)  Resp: 13 (09/20/19 1302)  BP: (!) 127/59 (09/20/19 1402)  SpO2: 100 % (09/20/19 1402) Vital Signs (24h Range):  Temp:  [97.8 °F (36.6 °C)] 97.8 °F (36.6 °C)  Pulse:  [80-95] 80  Resp:  [12-21] 13  SpO2:  [88 %-100 %] 100 %  BP: ()/(53-70) 127/59     Weight: 71.2 kg (157 lb)  Body mass index is 25.34 kg/m².    Physical Exam   Constitutional: He is oriented to person, place, and time. He appears well-developed. He appears cachectic. He appears ill. No distress.   HENT:   Head: Normocephalic and atraumatic.   Mouth/Throat: No oropharyngeal exudate.   Eyes: Pupils are equal, round, and reactive to light. Conjunctivae and EOM are normal. No scleral icterus.   Neck: Normal range of motion. Neck supple. No tracheal deviation present. No thyromegaly present.   Cardiovascular: Normal rate, regular rhythm, normal heart sounds and intact distal pulses.   No murmur heard.  Pulmonary/Chest: Effort normal and breath sounds normal. No respiratory distress. He has no wheezes. He has no rales. He exhibits no tenderness.   Abdominal: Soft. Bowel sounds are normal. He exhibits no distension. There is tenderness (epigastric, midline). There is guarding (voluntary). There is no rebound.   Musculoskeletal: Normal range of motion. He exhibits no edema or tenderness.   Lymphadenopathy:     He has no cervical adenopathy.   Neurological: He is alert and oriented to person, place, and time.   Skin: Skin is warm and dry. No rash noted. He is not diaphoretic. No erythema. No pallor.   Psychiatric: He has a normal mood and affect. Judgment and thought content normal. He is withdrawn.         CRANIAL NERVES     CN III, IV, VI   Pupils are equal, round, and reactive to light.  Extraocular motions are normal.        Significant Labs:   CBC:   Recent Labs   Lab 09/20/19  0942 09/20/19  0953   WBC 7.84  --    HGB 7.5*  --    HCT 23.9* 22*     --      CMP:   Recent Labs   Lab 09/20/19  0942   NA  141   K 3.3*      CO2 32*   *   BUN 11   CREATININE 4.9*   CALCIUM 8.2*   PROT 6.1   ALBUMIN 2.3*   BILITOT 0.3   ALKPHOS 196*   AST 21   ALT 10   ANIONGAP 8   EGFRNONAA 12.3*     Magnesium:   Recent Labs   Lab 09/20/19  0942   MG 2.2       Significant Imaging: I have reviewed and interpreted all pertinent imaging results/findings within the past 24 hours.

## 2019-09-20 NOTE — ASSESSMENT & PLAN NOTE
Continue PPI BID  H/o recurrent EGDs as noted above in HPI.   GI consulted; f/u recs. Tentative EGD scheduled for tomorrow.

## 2019-09-20 NOTE — ASSESSMENT & PLAN NOTE
Chronic, stable.   Continue to monitor.   Not currently on DAPT, or AC given h/o ICH/GIB.   PT/OT.

## 2019-09-20 NOTE — ASSESSMENT & PLAN NOTE
Consult nephrology for resumption of HD.   Dialyzes M/W/F. Did not complete session this morning prior to arrival.   No current indication for emergent HD; patient is not overloaded, oxygenating adequately on RA, lytes are acceptable.

## 2019-09-20 NOTE — ASSESSMENT & PLAN NOTE
Chronic blood loss  Suspect this is due to chronic blood loss, anemia of chronic disease and possibly superimposed acute GIB loss due to gastritis vs PUD vs esophagitis, vs MWT (in setting of vomiting). No prior history of cancer or esophageal varices on multiple prior EGDs.   Continue PPI, judicious fluids (caution due to ESRD).   Patient is currently hemodynamically stable. Hg 7.5, no indication for emergent transfusion. However, will type and cross, consent. Transfuse for Hg <7g/dl. Check Hg/HCT q12h. Maintain 2 large bore IVs.   GI consulted; plan for EGD in am. NPO at midnight. CLD for now.

## 2019-09-20 NOTE — MEDICAL/APP STUDENT
"[facility] Hospital Medicine  History & Physical    Patient Name: Vaughn Retana  MRN: [id number]  Admission Date: 9/20/2019  Attending Physician: Spenser Ochoa III, MD   Primary Care Provider: Yvette Zelaya NP    [facility] Medicine Team: [facility] HOSP MED A Tushar Burns     Patient information was obtained from patient and ER records.     Subjective:     Principal Problem: hematemesis    Chief Complaint:   Chief Complaint   Patient presents with    Abdominal Pain     Unable to finish dialysis only got 1 hour. Last full tx Wednesday.         HPI:   Mr. Retana, a 54 year old M with PMHx of ESRD on dialysis MWF, L below knee amputation 2/2 osteomyelitis, presenting to [facility] ED with primary complaint of hematemesis. Patient is a poor historian and has limited knowledge of his medical history. He states that overnight he vomited 5 times. The vomitus was a mixture of gastric contents and dark brown blood. This morning at dialysis he vomited dark brown blood 2 more times and was advised by dialysis staff to report to ED. Associated symptoms include epigastric abdominal pain and diarrhea. Last normal stool was 2 days ago. Patient reports epigastric abdominal pain at baseline which is worse with eating. Denies hematochezia, melena, fever, chills, SOB, headaches, dizziness, visual changes, hearing changes, headaches.     Patient reports weight loss of "one hundred and something pounds" over the past 6 months, which he attributes to decreased appetite, abdominal pain with PO intake and recurrent vomiting. However, on chart review weight was recorded at 63.9kg/140lbs at ED visit 03/07/2019 - today's weight is 71.2kgs/157lbs.     Patient is unsure which medications he takes and reports his niece manages his daily meds. He currently lives in a motel. Was previously living with his sister until one month ago. States he moved out for more privacy. He is a former smoker, 2 packs-per-day for approximately " "40 years. States he quit completely one year ago. Currently uses chewing tobacco daily. Denies vaping. Denies ETOH use. Denies use of illicit drugs.    Patient gives niece, "Ector", phone number and give permission to contact her about his medical history and care: [phone].    Past Medical History:   Diagnosis Date    Amputation stump pain 9/10/2013    Aspiration pneumonia 7/27/2015    Asterixis 11/8/2016    C. difficile colitis 8/7/2015    Cholelithiasis without obstruction 8/25/2015    Chronic diastolic heart failure     2-23-17   1 - Low normal to mildly depressed left ventricular systolic function (EF 50-55%).    2 - Right ventricular enlargement with mildly depressed systolic function.    3 - Left ventricular diastolic dysfunction.    4 - Right atrial enlargement.    5 - Severe tricuspid regurgitation.    6 - Pulmonary hypertension. The estimated PA systolic pressure is 86 mmHg.    7 - Increased central venous pressure.    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    Metabolic acidosis, IAG, reduced excretion of inorganic acids     Myoclonic jerking 9/20/2016    Noncompliance with medication regimen 12/4/2018    Secondary hyperparathyroidism (of renal origin)     Secondary pulmonary hypertension 3/23/2017    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    TB lung, latent 08/25/2015    Negative Quantiferon Gold 3-23-17     Past Surgical History:   Procedure " Laterality Date    AMPUTATION, BELOW KNEE Left 12/18/2013    Performed by Elgin Houston MD at [facility] OR 1ST FLR    COLONOSCOPY      COLONOSCOPY N/A 4/4/2017    Performed by Walker Stern MD at [facility] ENDO (2ND FLR)    EGD (ESOPHAGOGASTRODUODENOSCOPY) N/A 3/7/2019    Performed by Man Galicia MD at [facility] ENDO (2ND FLR)    EGD (ESOPHAGOGASTRODUODENOSCOPY) N/A 6/12/2018    Performed by Man Galicia MD at [facility] ENDO (2ND FLR)    ESOPHAGOGASTRODUODENOSCOPY (EGD) N/A 5/22/2018    Performed by Ke Sparks MD at [facility] ENDO (2ND FLR)    ESOPHAGOGASTRODUODENOSCOPY (EGD) N/A 3/16/2018    Performed by Kevin De La Paz MD at [facility] ENDO (2ND FLR)    ESOPHAGOGASTRODUODENOSCOPY (EGD) N/A 3/8/2018    Performed by Man Galicia MD at [facility] ENDO (2ND FLR)    ESOPHAGOGASTRODUODENOSCOPY (EGD) N/A 4/4/2017    Performed by Walker Stern MD at [facility] ENDO (2ND FLR)    ESOPHAGOGASTRODUODENOSCOPY (EGD) N/A 10/17/2014    Performed by Man Galicia MD at [facility] ENDO (2ND FLR)    FISTULOGRAM Right 9/18/2014    Performed by Grayson Hubbard MD at [facility] CATH LAB    FOOT AMPUTATION THROUGH METATARSAL      left foot    LEG AMPUTATION THROUGH KNEE  12/18/2013    left BKA    R AVF  9/12/12    UPPER GASTROINTESTINAL ENDOSCOPY       Family History   Problem Relation Age of Onset    Early death Mother     Kidney disease Father     Hypertension Father     Heart disease Father     Hyperlipidemia Father     Diabetes Brother     Alcohol abuse Maternal Grandmother     Diabetes Maternal Grandfather     Hypertension Sister     Melanoma Neg Hx      Review of patient's allergies indicates:   Allergen Reactions    Fosrenol [lanthanum] Nausea And Vomiting     Nausea and vomiting   Patient states NKDA.    No current facility-administered medications on file prior to encounter.      Current Outpatient Medications on File Prior to Encounter   Medication Sig    acetaminophen (TYLENOL) 325 MG tablet Take 2 tablets (650 mg total) by  mouth every 8 (eight) hours as needed.    carvedilol (COREG) 3.125 MG tablet Take 1 tablet (3.125 mg total) by mouth 2 (two) times daily with meals.    metoclopramide HCl (REGLAN) 10 MG tablet Take 1 tablet (10 mg total) by mouth 3 (three) times daily before meals. can be titrated up as tolerated to maximum of 40 mg/day, with addition of evening dose as indicated,    midodrine (PROAMATINE) 5 MG Tab Please take 30 min before dialysis on Monday Wednesday and Friday    ondansetron (ZOFRAN) 8 MG tablet Take 1 tablet (8 mg total) by mouth every 12 (twelve) hours as needed for Nausea.    pantoprazole (PROTONIX) 40 MG tablet Take 1 tablet (40 mg total) by mouth 2 (two) times daily before meals.    RENAPLEX-D 800 mcg-12.5 mg -2,000 unit Tab Take 1 tablet by mouth once daily.    sevelamer carbonate (RENVELA) 800 mg Tab Take 2 tablets (1,600 mg total) by mouth 3 (three) times daily with meals.    sucralfate (CARAFATE) 100 mg/mL suspension Take 5 mLs (500 mg total) by mouth 2 (two) times daily.       Tobacco Use    Smoking status: Former Smoker     Packs/day: 1.00     Years: 10.00     Pack years: 10.00    Smokeless tobacco: Never Used   Substance and Sexual Activity    Alcohol use: No    Drug use: No    Sexual activity: Yes     Partners: Female     Birth control/protection: None     Review of Systems   Constitutional: Positive for activity change, appetite change and unexpected weight change (Lost 100 pounds in past 6 months). Negative for chills, diaphoresis, fatigue and fever.   HENT: Positive for trouble swallowing. Negative for congestion, postnasal drip, rhinorrhea, sinus pressure, sinus pain, sneezing, sore throat and voice change.    Eyes: Negative for photophobia, pain, discharge, redness, itching and visual disturbance.   Respiratory: Negative for apnea, cough, choking, chest tightness, shortness of breath, wheezing and stridor.    Cardiovascular: Negative for chest pain, palpitations and leg swelling.    Gastrointestinal: Positive for abdominal pain, diarrhea, nausea and vomiting. Negative for abdominal distention, anal bleeding, blood in stool, constipation and rectal pain.   Endocrine: Negative for cold intolerance, heat intolerance, polydipsia, polyphagia and polyuria.   Genitourinary: Negative for decreased urine volume, difficulty urinating, dysuria, enuresis, flank pain, frequency, hematuria, scrotal swelling, testicular pain and urgency.   Musculoskeletal: Negative for arthralgias, back pain, gait problem, joint swelling, myalgias and neck stiffness.   Skin: Negative for color change, pallor, rash and wound.   Neurological: Negative for dizziness, tremors, seizures, syncope, facial asymmetry, speech difficulty, weakness, light-headedness, numbness and headaches.   Psychiatric/Behavioral: Positive for confusion. Negative for agitation and hallucinations.        Objective:     Vital Signs (Most Recent):  Temp: 97.8 °F (36.6 °C) (09/20/19 0843)  Pulse: 84 (09/20/19 1139)  Resp: 16 (09/20/19 1139)  BP: (!) 142/62 (09/20/19 1102)  SpO2: 98 % (09/20/19 1139) Vital Signs (24h Range):  Temp:  [97.8 °F (36.6 °C)] 97.8 °F (36.6 °C)  Pulse:  [84-95] 84  Resp:  [15-16] 16  SpO2:  [88 %-100 %] 98 %  BP: ()/(54-70) 142/62     Weight: 71.2 kg (157 lb)  Body mass index is 25.34 kg/m².    Physical Exam   Constitutional: He is oriented to person, place, and time. No distress.   Slender   HENT:   Head: Normocephalic and atraumatic.   Nose: Nose normal.   Mouth/Throat: Oropharynx is clear and moist. No oropharyngeal exudate.   Eyes: Pupils are equal, round, and reactive to light. Conjunctivae and EOM are normal. Right eye exhibits no discharge. Left eye exhibits no discharge. No scleral icterus.   Neck: Normal range of motion. Neck supple. No JVD present. No tracheal deviation present. No thyromegaly present.   Cardiovascular: Normal rate, regular rhythm, normal heart sounds and intact distal pulses. Exam reveals no  gallop and no friction rub.   No murmur heard.  Pulmonary/Chest: Effort normal and breath sounds normal. No stridor. No respiratory distress. He has no wheezes. He has no rales. He exhibits no tenderness.   Abdominal: He exhibits no distension and no mass. There is tenderness (midline, epigastric tenderness to palpation). There is guarding. There is no rebound. No hernia.   Hyperactive bowel sounds   Musculoskeletal: Normal range of motion. He exhibits no edema or tenderness.   LLE below knee amputation   Lymphadenopathy:     He has no cervical adenopathy.   Neurological: He is alert and oriented to person, place, and time. No cranial nerve deficit.   difficulty concentrating on questioning   Skin: Skin is warm and dry. No rash noted. He is not diaphoretic. No erythema. No pallor.         CRANIAL NERVES     CN III, IV, VI   Pupils are equal, round, and reactive to light.  Extraocular motions are normal.       Significant Labs:   A1C:   Recent Labs   Lab 06/22/19  0017   HGBA1C 4.7  4.7     Bilirubin:   Recent Labs   Lab 09/03/19  0307 09/04/19  0552 09/05/19  0555 09/06/19  0731 09/20/19  0942   BILITOT 0.3 0.4 0.4 0.4 0.3     BMP:   Recent Labs   Lab 09/20/19  0942   *      K 3.3*      CO2 32*   BUN 11   CREATININE 4.9*   CALCIUM 8.2*   MG 2.2     CBC:   Recent Labs   Lab 09/20/19  0942 09/20/19  0953   WBC 7.84  --    HGB 7.5*  --    HCT 23.9* 22*     --      CMP:   Recent Labs   Lab 09/20/19  0942      K 3.3*      CO2 32*   *   BUN 11   CREATININE 4.9*   CALCIUM 8.2*   PROT 6.1   ALBUMIN 2.3*   BILITOT 0.3   ALKPHOS 196*   AST 21   ALT 10   ANIONGAP 8   EGFRNONAA 12.3*     Coagulation:   Recent Labs   Lab 09/20/19  0942   INR 1.1   Protime: 10.8    Lactic Acid:   Recent Labs   Lab 09/20/19  0942   LACTATE 0.7     Lipase:   Recent Labs   Lab 09/20/19  0942   LIPASE 30     Magnesium:   Recent Labs   Lab 09/20/19  0942   MG 2.2       Significant Imaging:  Gastric  Emptying 09/06/2019: Findings: At 4 hours the percentage of retention is 50% (normal retention at 4 hours is 10% and lower).    EGD 08/02/2019 from [facility] - Impression: LA Grade A reflux esophagitis, normal stomach, normal examined duodenum. Recommendation: resume previous diet, follow antireflux regimen, use Protonix (pantoprazole) 40mg PO daily for 1 month    Assessment/Plan:     Hematemesis:  - WBC, lactic acid, lipase all within normal limits  - Zofran IV PRN  - Clear liquids tonight, NPO at midnight for scope in AM  - monitor electrolytes, replete as necessary   - Consulted GI, recommend repeat EGD in AM     Anemia of chronic disease  - Monitor daily CBC  - Type and Screen    Hypokalemia  - Potassium of 3.3 today  -replete and monitor     ESRD on HD  - Consult nephrology for ongoing HD while admitted inpatient, MWF  - Continue to monitor renal function with daily labs   - Avoid nephrotoxins  - Renally dose all medications   - Monitor events that may lead to decreased renal perfusion (hypovolemia, hypotension, sepsis)  - continue PTA sevelamer     Diabetes Mellitus:  - Last HbA1c: 4.7  - ADA diet     Gastroparesis:  - continue Reglan     GERD:  - continue PPI, sucralfate    VTE Risk Mitigation (From admission, onward)    None          Tushar Burns, MS3  Department of Hospital Medicine   [facility]

## 2019-09-20 NOTE — ASSESSMENT & PLAN NOTE
Chronic blood loss anemia  Hg on admission 7.5, most recent trend show baseline around 8-9. Earlier, baseline appeared to be closer to 10-12. Concern for chronic GI loss vs worsening AoCD.   Check iron, ferritin, folate, B12,   Transfuse for Hg <7 g/dl.   Typed and crossed, consented.   CBC q 12h for today.   Judicious IVF prn for hemodynamics, caution due to ESRD and volume overload.   EPO per nephrology.

## 2019-09-20 NOTE — ED PROVIDER NOTES
Encounter Date: 9/20/2019       History     Chief Complaint   Patient presents with    Abdominal Pain     Unable to finish dialysis only got 1 hour. Last full tx Wednesday.      55 yo M with medical comorbidities significant for ESRD (HD MWF), history of GI bleed/ erosive esophagitis, HTN , CVA/ intracerebral hemorrhage, CHF presents to the ED with a chief complaint of abdominal pain. Per EMS, patient was on dialysis machine when he began to have severe generalized abdominal pain. History from the patient is limited as he is no forthcoming with information.  He is unable to describe the abdominal pain. Pt also actively vomiting. Able to speak, but unwilling to use his words. Occasionally answering with a nod or head shake.   Patient has had multiple admissions for hematemesis and GI bleed recently.  History of esophagitis and bleeding esophageal ulcer.  He denies chest pain, SOB, fevers, diarrhea.  Patient did not receive full dialysis treatment today.  Last full treatment was 2 days ago.        Review of patient's allergies indicates:   Allergen Reactions    Fosrenol [lanthanum] Nausea And Vomiting     Nausea and vomiting     Past Medical History:   Diagnosis Date    Amputation stump pain 9/10/2013    Aspiration pneumonia 7/27/2015    Asterixis 11/8/2016    C. difficile colitis 8/7/2015    Cholelithiasis without obstruction 8/25/2015    Chronic diastolic heart failure     2-23-17   1 - Low normal to mildly depressed left ventricular systolic function (EF 50-55%).    2 - Right ventricular enlargement with mildly depressed systolic function.    3 - Left ventricular diastolic dysfunction.    4 - Right atrial enlargement.    5 - Severe tricuspid regurgitation.    6 - Pulmonary hypertension. The estimated PA systolic pressure is 86 mmHg.    7 - Increased central venous pressure.    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    Metabolic acidosis, IAG, reduced excretion of inorganic acids     Myoclonic jerking 9/20/2016    Noncompliance with medication regimen 12/4/2018    Secondary hyperparathyroidism (of renal origin)     Secondary pulmonary hypertension 3/23/2017    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    TB lung, latent 08/25/2015    Negative Quantiferon Gold 3-23-17     Past Surgical History:   Procedure Laterality Date    AMPUTATION, BELOW KNEE Left 12/18/2013    Performed by Elgin Houston MD at [facility] OR 1ST FLR    COLONOSCOPY      COLONOSCOPY N/A 4/4/2017    Performed by [facility] at [facility] (2ND FLR)    EGD (ESOPHAGOGASTRODUODENOSCOPY) N/A 3/7/2019    Performed by Man Galicia MD at [facility] ENDO (2ND FLR)    EGD (ESOPHAGOGASTRODUODENOSCOPY) N/A 6/12/2018    Performed by Man Galicia MD at [facility] ENDO (2ND FLR)    ESOPHAGOGASTRODUODENOSCOPY (EGD) N/A 5/22/2018    Performed by Ke Sparks MD at [facility] ENDO (2ND FLR)    ESOPHAGOGASTRODUODENOSCOPY (EGD) N/A 3/16/2018    Performed by Kevin De La Paz MD at [facility] ENDO (2ND FLR)    ESOPHAGOGASTRODUODENOSCOPY (EGD) N/A 3/8/2018    Performed by Man Galicia MD at [facility] ENDO (2ND FLR)    ESOPHAGOGASTRODUODENOSCOPY (EGD) N/A 4/4/2017    Performed by Walker Stern MD at [facility] ENDO (2ND FLR)    ESOPHAGOGASTRODUODENOSCOPY (EGD) N/A 10/17/2014    Performed by Man Galicia MD at [facility] ENDO (2ND FLR)    FISTULOGRAM Right 9/18/2014    Performed by Grayson Hubbard MD at [facility] CATH LAB    FOOT AMPUTATION  THROUGH METATARSAL      left foot    LEG AMPUTATION THROUGH KNEE  12/18/2013    left BKA    R AVF  9/12/12    UPPER GASTROINTESTINAL ENDOSCOPY       Family History   Problem Relation Age of Onset    Early death Mother     Kidney disease Father     Hypertension Father     Heart disease Father     Hyperlipidemia Father     Diabetes Brother     Alcohol abuse Maternal Grandmother     Diabetes Maternal Grandfather     Hypertension Sister     Melanoma Neg Hx      Social History     Tobacco Use    Smoking status: Former Smoker     Packs/day: 1.00     Years: 10.00     Pack years: 10.00    Smokeless tobacco: Never Used   Substance Use Topics    Alcohol use: No    Drug use: No     Review of Systems   Unable to perform ROS: Acuity of condition   Constitutional: Negative for fever.   Respiratory: Negative for shortness of breath.    Cardiovascular: Negative for chest pain.   Gastrointestinal: Positive for abdominal pain and vomiting. Negative for diarrhea.       Physical Exam     Initial Vitals [09/20/19 0843]   BP Pulse Resp Temp SpO2   100/70 95 15 97.8 °F (36.6 °C) 96 %      MAP       --         Physical Exam    Nursing note and vitals reviewed.  Constitutional: He appears well-developed and well-nourished.  Non-toxic appearance. He appears ill. No distress.   Thin chronically ill-appearing. Actively vomiting dark brown vomitus   HENT:   Head: Normocephalic and atraumatic.   Neck: Normal range of motion. Neck supple.   Cardiovascular: Normal rate and regular rhythm. Exam reveals no gallop, no distant heart sounds and no friction rub.    No murmur heard.  Pulmonary/Chest: Effort normal and breath sounds normal. No accessory muscle usage. No tachypnea. No respiratory distress. He has no decreased breath sounds. He has no wheezes. He has no rhonchi. He has no rales.   Abdominal: Soft. He exhibits no distension and no mass. There is generalized tenderness. There is no guarding.   Mild-moderate generalized ttp    Neurological: He is alert.   Skin: No rash noted.         ED Course   Procedures  Labs Reviewed   CBC W/ AUTO DIFFERENTIAL - Abnormal; Notable for the following components:       Result Value    RBC 2.27 (*)     Hemoglobin 7.5 (*)     Hematocrit 23.9 (*)     Mean Corpuscular Volume 105 (*)     Mean Corpuscular Hemoglobin 33.0 (*)     Mean Corpuscular Hemoglobin Conc 31.4 (*)     RDW 16.5 (*)     Lymph% 16.5 (*)     All other components within normal limits   COMPREHENSIVE METABOLIC PANEL - Abnormal; Notable for the following components:    Potassium 3.3 (*)     CO2 32 (*)     Glucose 113 (*)     Creatinine 4.9 (*)     Calcium 8.2 (*)     Albumin 2.3 (*)     Alkaline Phosphatase 196 (*)     eGFR if  14.3 (*)     eGFR if non  12.3 (*)     All other components within normal limits   PHOSPHORUS - Abnormal; Notable for the following components:    Phosphorus 1.7 (*)     All other components within normal limits   ISTAT PROCEDURE - Abnormal; Notable for the following components:    POC Glucose 115 (*)     POC Creatinine 5.1 (*)     POC Potassium 3.3 (*)     POC TCO2 (MEASURED) 32 (*)     POC Hematocrit 22 (*)     All other components within normal limits   LIPASE   LACTIC ACID, PLASMA   MAGNESIUM   PROTIME-INR   CBC W/ AUTO DIFFERENTIAL   TYPE & SCREEN   ISTAT CHEM8          Imaging Results    None          Medical Decision Making:   History:   Old Medical Records: I decided to obtain old medical records.  Old Records Summarized: records from another hospital.       <> Summary of Records: Multiple recent admissions for hematemesis, GI bleed.  History of bleeding esophageal ulcer.   Differential Diagnosis:   My differential diagnosis includes but is not limited to:  GI bleed, hemorrhagic shock, anemia, electrolyte derangement, dehydration, ZAINAB  Clinical Tests:   Lab Tests: Ordered  ED Management:  54-year-old male with ESRD and history of GI bleeds presents with abdominal pain and active  coffee-ground emesis.  BP is 95/54 on my evaluation.  Mild to moderate generalized abdominal tenderness to palpation.     I suspect acute upper GI bleed.  Will give urgent IV fluids, Protonix, reglan, obtain blood work and consult GI services.   Patient with continued vomiting despite IV Reglan.  Will give Zofran.   Patient improved after Zofran.  Hemoglobin 7.5 today.  Baseline likely 9-10.  Mild hypokalemia at 3.3.  Mild hypophosphatemia 1.7.  No other concerning electrolyte derangements.  BP improved after 500 cc fluid bolus.   Discussed with GI.  Patient will be placed in observation on Hospital Medicine service for anemia and suspected GI bleed.  I have reviewed the patient's records and discussed this case with my supervising physician.    Other:   I have discussed this case with another health care provider.       <> Summary of the Discussion: GI, Hospital Medicine                      Clinical Impression:       ICD-10-CM ICD-9-CM   1. Coffee ground emesis K92.0 578.0   2. Anemia, unspecified type D64.9 285.9   3. Generalized abdominal pain R10.84 789.07         Disposition:   Disposition: Admitted  Condition: James Rodríguez PA-C  09/20/19 1201

## 2019-09-20 NOTE — NURSING
PATIENT NON-COMPLIANT WITH CLEAR LIQUID DIET. PATIENT OBSERVED REMOVING SANDWICHES FROM HIS PERSONAL BAG. PATIENT EDUCATED ON THE REASON FOR CLEAR LIQUID DIET IN PREPARATION FOR POSSIBLE PLANNED PROCEDURE IN AM. PATIENT STATED THAT HE WAS GOING TO EAT WITH OR WITHOUT US (THE NURSING STAFF) KNOWING.

## 2019-09-20 NOTE — ED NOTES
Pt is sleeping comfortably on a stretcher. Pulse ox wave length was artifact. Readjusted to another finger. NAD noted, VSS. BP cuff and pulse ox alarms are set. Bed low and locked, side rails up x2. Call light within reach of pt. Will continue to monitor.

## 2019-09-20 NOTE — H&P (VIEW-ONLY)
[facility]  Gastroenterology  Consult Note    Patient Name: Vaughn Retana  MRN: [id number]  Admission Date: 9/20/2019  Hospital Length of Stay: 0 days  Code Status: Prior   Attending Provider: Spenser Ochoa III, MD   Consulting Provider: Karishma Guerrier DO  Primary Care Physician: Yvette Zelaya NP  Principal Problem:<principal problem not specified>    Inpatient consult to Gastroenterology  Consult performed by: Karishma Guerrier DO  Consult ordered by: Kassy Rodríguez PA-C        Subjective:     HPI: Mr. Retana is a 53 y/o AAM with known medial issues of ESRD (dialysis MWF), history of erosive esophagitis, non-bleeding esophageal ulcers, HTN, CVA intracranial hemorrhage, and HFpEF. Reports to the ED due to having an acute episode of non-radiating epigastric abdominal during dialysis today (9/20) associated with a couple of episodes of emesis for which the patient reports he had a small amount of bright red blood associated with his emesis after several retching attempts. Upon speaking with providers in the ED he had additional episodes of emesis in the ED that appeared to be like coffee ground emesis associated with some hypotension. The patient was not very forthcoming with information upon examination but reports that he is not currently having abdominal pain, nausea, dysphagia, break through reflux, melena or hematochezia. The patient was discharged from the hospital on 9/6/19 for similar symptoms of abdominal pain with intractable nausea and vomiting and was evaluated by GI at that time as well. However, a repeat EGD was not done due to the patient being seen a [facility] earlier in August and had an EGD done on 8/2 showing grade A esophagitis. A NM gastric emptying study was done during the last admission that was suggestive of gastroparesis retaining 50% of his gastric contents 4 hours after meals. He was placed on a PPI 40 mg BID, Reglan TID before meals, and Carafate BID and reports  being compliant with his medications. The following is a break down of his GI procedure history:    8/22/19 - EGD [facility] - LA grade A reflux esophagitis with no active bleeding    3/7/2019 - EGD [facility] - LA Grade D reflux esophagitis involving the entire esophagus  with a 2 cm sliding hiatal hernia. Congestive gastropathy, erythematous doudenopathy. No specimens collected    5/22/2018 - EGD [facility] - LA grade D reflux esophagitis with small hiatal hernia. Normal stomach and duodenum. No specimens collected    3/16/2018 - EGD [facility] - LA grade C reflux esophagitis with non-bleeding gastric ulcer with a clean base and a 4 cm hiatal hernia. 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 Normal duodenum    3/8/2018 - EGD [facility] - LA Grade B reflux esophagitis with a 3 cm hiatal hernia. Gastric polyp no biopsies. Normal duodenum    4/4/2017 - EGD [facility] -  LA Grade D reflux esophagitis with a small hiatal hernia. Normal stomach and duodenum    4/4/2017 - Colonoscopy [facility] - 3 mm polyp removed from the transverse colon pathology adenomatous. 8 mm polyp found in the sigmoid colon removed Pathology adenovillous. Suggestion to repeat in 3 years    The patient is currently hemodynamically stable. Not complaining of any current symptomology. Abdominal physical exam was benign.    Hgb 7.5 (8.7 on 9/6) Plt 286, Alk phos 196, Alb 2.3, AST 21, ALT 10, Lipase 30  Past Medical History:   Diagnosis Date    Amputation stump pain 9/10/2013    Aspiration pneumonia 7/27/2015    Asterixis 11/8/2016    C. difficile colitis 8/7/2015    Cholelithiasis without obstruction 8/25/2015    Chronic diastolic heart failure     2-23-17   1 - Low normal to mildly depressed left ventricular systolic function (EF 50-55%).    2 - Right ventricular enlargement with mildly depressed systolic function.    3 - Left ventricular diastolic dysfunction.    4 - Right atrial enlargement.    5 - Severe tricuspid  regurgitation.    6 - Pulmonary hypertension. The estimated PA systolic pressure is 86 mmHg.    7 - Increased central venous pressure.    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    Metabolic acidosis, IAG, reduced excretion of inorganic acids     Myoclonic jerking 9/20/2016    Noncompliance with medication regimen 12/4/2018    Secondary hyperparathyroidism (of renal origin)     Secondary pulmonary hypertension 3/23/2017    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    TB lung, latent 08/25/2015    Negative Quantiferon Gold 3-23-17       Past Surgical History:   Procedure Laterality Date    AMPUTATION, BELOW KNEE Left 12/18/2013    Performed by Elgin Houston MD at [facility] OR 1ST FLR    COLONOSCOPY      COLONOSCOPY N/A 4/4/2017    Performed by Walker [facility] at [facility] (2ND FLR)    EGD (ESOPHAGOGASTRODUODENOSCOPY) N/A 3/7/2019    Performed by Man Galicia MD at [facility] ENDO (2ND FLR)    EGD (ESOPHAGOGASTRODUODENOSCOPY) N/A 6/12/2018    Performed by Man Galicia MD at [facility] ENDO (2ND FLR)    ESOPHAGOGASTRODUODENOSCOPY (EGD) N/A 5/22/2018    Performed by Ke Sparks MD at [facility] ENDO (2ND FLR)    ESOPHAGOGASTRODUODENOSCOPY (EGD) N/A 3/16/2018    Performed by Kevin De La Paz MD at [facility] ENDO (2ND FLR)    ESOPHAGOGASTRODUODENOSCOPY (EGD) N/A 3/8/2018    Performed by Man Galicia MD at [facility] ENDO (2ND FLR)    ESOPHAGOGASTRODUODENOSCOPY (EGD) N/A 4/4/2017     Performed by Walker Stern MD at [facility] ENDO (2ND FLR)    ESOPHAGOGASTRODUODENOSCOPY (EGD) N/A 10/17/2014    Performed by Man Galicia MD at [facility] ENDO (2ND FLR)    FISTULOGRAM Right 9/18/2014    Performed by Grayson Hubbard MD at [facility] CATH LAB    FOOT AMPUTATION THROUGH METATARSAL      left foot    LEG AMPUTATION THROUGH KNEE  12/18/2013    left BKA    R AVF  9/12/12    UPPER GASTROINTESTINAL ENDOSCOPY         Review of patient's allergies indicates:   Allergen Reactions    Fosrenol [lanthanum] Nausea And Vomiting     Nausea and vomiting     Family History     Problem Relation (Age of Onset)    Alcohol abuse Maternal Grandmother    Diabetes Brother, Maternal Grandfather    Early death Mother    Heart disease Father    Hyperlipidemia Father    Hypertension Father, Sister    Kidney disease Father        Tobacco Use    Smoking status: Former Smoker     Packs/day: 1.00     Years: 10.00     Pack years: 10.00    Smokeless tobacco: Never Used   Substance and Sexual Activity    Alcohol use: No    Drug use: No    Sexual activity: Yes     Partners: Female     Birth control/protection: None     Review of Systems   Constitutional: Negative for activity change, appetite change, chills, fatigue and fever.   HENT: Negative for sore throat, trouble swallowing and voice change.    Eyes: Negative for visual disturbance.   Respiratory: Negative for choking, chest tightness, shortness of breath and wheezing.    Cardiovascular: Negative for chest pain, palpitations and leg swelling.   Gastrointestinal: Positive for nausea and vomiting. Negative for abdominal distention, abdominal pain, blood in stool, constipation and diarrhea.   Musculoskeletal: Negative for arthralgias and back pain.   Skin: Negative for color change and pallor.   Neurological: Negative for dizziness, weakness, light-headedness and headaches.   Psychiatric/Behavioral: Negative for agitation, behavioral problems and confusion. The patient is not  nervous/anxious.      Objective:     Vital Signs (Most Recent):  Temp: 97.8 °F (36.6 °C) (09/20/19 0843)  Pulse: 80 (09/20/19 1249)  Resp: 12 (09/20/19 1249)  BP: (!) 118/53 (09/20/19 1249)  SpO2: 96 % (09/20/19 1249) Vital Signs (24h Range):  Temp:  [97.8 °F (36.6 °C)] 97.8 °F (36.6 °C)  Pulse:  [80-95] 80  Resp:  [12-21] 12  SpO2:  [88 %-100 %] 96 %  BP: ()/(53-70) 118/53     Weight: 71.2 kg (157 lb) (09/20/19 0843)  Body mass index is 25.34 kg/m².    No intake or output data in the 24 hours ending 09/20/19 1314    Lines/Drains/Airways     Drain                 Hemodialysis AV Fistula Right upper arm -- days          Peripheral Intravenous Line                 Peripheral IV - Single Lumen 09/20/19 0942 18 G Left Antecubital less than 1 day                Physical Exam   Constitutional: He is oriented to person, place, and time. He appears cachectic. No distress.   HENT:   Head: Normocephalic and atraumatic.   Eyes: EOM are normal. Scleral icterus is present.   Neck: Normal range of motion. Neck supple.   Cardiovascular: Normal rate and regular rhythm.   Murmur heard.  Pulmonary/Chest: Effort normal. No respiratory distress. He exhibits no tenderness.   Abdominal: Soft. Bowel sounds are normal. He exhibits no distension and no mass. There is no tenderness. There is no guarding.   Musculoskeletal: He exhibits deformity. He exhibits no tenderness.   Neurological: He is alert and oriented to person, place, and time.   Skin: Skin is warm and dry. He is not diaphoretic. No erythema. No pallor.   Psychiatric: He has a normal mood and affect. His behavior is normal. Judgment and thought content normal.       Significant Labs:  CBC:   Recent Labs   Lab 09/20/19  0942 09/20/19 0953   WBC 7.84  --    HGB 7.5*  --    HCT 23.9* 22*     --      CMP:   Recent Labs   Lab 09/20/19 0942   *   CALCIUM 8.2*   ALBUMIN 2.3*   PROT 6.1      K 3.3*   CO2 32*      BUN 11   CREATININE 4.9*   ALKPHOS 196*    ALT 10   AST 21   BILITOT 0.3     Lipase:   Recent Labs   Lab 09/20/19  0942   LIPASE 30     Liver Function Test:   Recent Labs   Lab 09/20/19  0942   ALT 10   AST 21   ALKPHOS 196*   BILITOT 0.3   PROT 6.1   ALBUMIN 2.3*       Significant Imaging:  Imaging results within the past 24 hours have been reviewed.     Assessment/Plan:     Active Diagnoses:    Diagnosis Date Noted POA    Coffee ground emesis [K92.0] 08/25/2019 Yes      Problems Resolved During this Admission:     55 y/o AAM with a history of ESRD (dialysis MWF),erosive esophagitis with non-bleeding esophageal ulcers, and gastroparesis. present with one day of acute epigastric abdominal pain while in the middle of dialysis causing him to have emesis a couple times with a little bit of bright red blood streaking after several episodes of retching. The patient was transferred to the ED and has more dark brown emesis that was concerning for possible coffee ground emesis. The patient has been seen and scoped many time by GI over the past 2 years. Most recent EGD at [facility] on 8/2/19 showed LA grade A reflux esophagitis with no active bleeding. This is an improvement from his EGD at [facility] on 3/2019 showing LA Grade D reflux esophagitis involving the entire esophagus with a 2 cm sliding hiatal hernia. Congestive gastropathy, erythematous doudenopathy. The patient reports being complaint with his home PPI 40 mg PID, Carafate BID, and Reglan 10 mg TID before meals due to his gastroparesis    The patient is currently hemodynamically stable Hgb on admission is 7.5 (8.7 on 9/6).    DDX: Jennie Heidi esophageal tear, PUD, bleeding esophageal ulcers, esophagitis    Plan:  -- Trend CBC BID and transfuse with PRBC if <7   -- Patient can be placed on a clear liquid diet today and made NPO after midnight for a tentative EGD tomorrow 9/21. If the patients Hgb does not drop or he clinically improves okay to discharge without inpatient EGD and just have the patient follow up  with GI outpatient for management of his gastroparesis and future EGD follow up  -- Discontinue NSAID use at this time.   -- Okay to place back on home medication of PPI BID, Carafate BID, and Reglan TID before meals. Please monitor patient with Reglan use while in the hospital due to risk of developing tardive dyskinesias and QT prolongation.    Thank you for your consult.     Karishma Guerrier,   Gastroenterology  [facility]

## 2019-09-20 NOTE — CONSULTS
[facility]  Gastroenterology  Consult Note    Patient Name: Vaughn Retana  MRN: [id number]  Admission Date: 9/20/2019  Hospital Length of Stay: 0 days  Code Status: Prior   Attending Provider: Spenser Ochoa III, MD   Consulting Provider: Karishma Guerrier DO  Primary Care Physician: Yvette Zelaya NP  Principal Problem:<principal problem not specified>    Inpatient consult to Gastroenterology  Consult performed by: Karishma Guerrier DO  Consult ordered by: Kassy Rodríguez PA-C        Subjective:     HPI: Mr. Retana is a 53 y/o AAM with known medial issues of ESRD (dialysis MWF), history of erosive esophagitis, non-bleeding esophageal ulcers, HTN, CVA intracranial hemorrhage, and HFpEF. Reports to the ED due to having an acute episode of non-radiating epigastric abdominal during dialysis today (9/20) associated with a couple of episodes of emesis for which the patient reports he had a small amount of bright red blood associated with his emesis after several retching attempts. Upon speaking with providers in the ED he had additional episodes of emesis in the ED that appeared to be like coffee ground emesis associated with some hypotension. The patient was not very forthcoming with information upon examination but reports that he is not currently having abdominal pain, nausea, dysphagia, break through reflux, melena or hematochezia. The patient was discharged from the hospital on 9/6/19 for similar symptoms of abdominal pain with intractable nausea and vomiting and was evaluated by GI at that time as well. However, a repeat EGD was not done due to the patient being seen a [facility] earlier in August and had an EGD done on 8/2 showing grade A esophagitis. A NM gastric emptying study was done during the last admission that was suggestive of gastroparesis retaining 50% of his gastric contents 4 hours after meals. He was placed on a PPI 40 mg BID, Reglan TID before meals, and Carafate BID and reports  being compliant with his medications. The following is a break down of his GI procedure history:    8/22/19 - EGD [facility] - LA grade A reflux esophagitis with no active bleeding    3/7/2019 - EGD [facility] - LA Grade D reflux esophagitis involving the entire esophagus  with a 2 cm sliding hiatal hernia. Congestive gastropathy, erythematous doudenopathy. No specimens collected    5/22/2018 - EGD [facility] - LA grade D reflux esophagitis with small hiatal hernia. Normal stomach and duodenum. No specimens collected    3/16/2018 - EGD [facility] - LA grade C reflux esophagitis with non-bleeding gastric ulcer with a clean base and a 4 cm hiatal hernia. 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 Normal duodenum    3/8/2018 - EGD [facility] - LA Grade B reflux esophagitis with a 3 cm hiatal hernia. Gastric polyp no biopsies. Normal duodenum    4/4/2017 - EGD [facility] -  LA Grade D reflux esophagitis with a small hiatal hernia. Normal stomach and duodenum    4/4/2017 - Colonoscopy [facility] - 3 mm polyp removed from the transverse colon pathology adenomatous. 8 mm polyp found in the sigmoid colon removed Pathology adenovillous. Suggestion to repeat in 3 years    The patient is currently hemodynamically stable. Not complaining of any current symptomology. Abdominal physical exam was benign.    Hgb 7.5 (8.7 on 9/6) Plt 286, Alk phos 196, Alb 2.3, AST 21, ALT 10, Lipase 30  Past Medical History:   Diagnosis Date    Amputation stump pain 9/10/2013    Aspiration pneumonia 7/27/2015    Asterixis 11/8/2016    C. difficile colitis 8/7/2015    Cholelithiasis without obstruction 8/25/2015    Chronic diastolic heart failure     2-23-17   1 - Low normal to mildly depressed left ventricular systolic function (EF 50-55%).    2 - Right ventricular enlargement with mildly depressed systolic function.    3 - Left ventricular diastolic dysfunction.    4 - Right atrial enlargement.    5 - Severe tricuspid  regurgitation.    6 - Pulmonary hypertension. The estimated PA systolic pressure is 86 mmHg.    7 - Increased central venous pressure.    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    Metabolic acidosis, IAG, reduced excretion of inorganic acids     Myoclonic jerking 9/20/2016    Noncompliance with medication regimen 12/4/2018    Secondary hyperparathyroidism (of renal origin)     Secondary pulmonary hypertension 3/23/2017    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    TB lung, latent 08/25/2015    Negative Quantiferon Gold 3-23-17       Past Surgical History:   Procedure Laterality Date    AMPUTATION, BELOW KNEE Left 12/18/2013    Performed by Elgin Houston MD at [facility] OR 1ST FLR    COLONOSCOPY      COLONOSCOPY N/A 4/4/2017    Performed by Walker Stern [facility] at [facility] (2ND FLR)    EGD (ESOPHAGOGASTRODUODENOSCOPY) N/A 3/7/2019    Performed by Man Galicia MD at [facility] ENDO (2ND FLR)    EGD (ESOPHAGOGASTRODUODENOSCOPY) N/A 6/12/2018    Performed by Man Galicia MD at [facility] ENDO (2ND FLR)    ESOPHAGOGASTRODUODENOSCOPY (EGD) N/A 5/22/2018    Performed by Ke Sparks MD at [facility] ENDO (2ND FLR)    ESOPHAGOGASTRODUODENOSCOPY (EGD) N/A 3/16/2018    Performed by Kevin De La Paz MD at [facility] ENDO (2ND FLR)    ESOPHAGOGASTRODUODENOSCOPY (EGD) N/A 3/8/2018    Performed by Man Galicia MD at [facility] ENDO (2ND FLR)    ESOPHAGOGASTRODUODENOSCOPY (EGD) N/A 4/4/2017     Performed by Walker Stern MD at [facility] ENDO (2ND FLR)    ESOPHAGOGASTRODUODENOSCOPY (EGD) N/A 10/17/2014    Performed by Man Galicia MD at [facility] ENDO (2ND FLR)    FISTULOGRAM Right 9/18/2014    Performed by Grayson Hubbard MD at [facility] CATH LAB    FOOT AMPUTATION THROUGH METATARSAL      left foot    LEG AMPUTATION THROUGH KNEE  12/18/2013    left BKA    R AVF  9/12/12    UPPER GASTROINTESTINAL ENDOSCOPY         Review of patient's allergies indicates:   Allergen Reactions    Fosrenol [lanthanum] Nausea And Vomiting     Nausea and vomiting     Family History     Problem Relation (Age of Onset)    Alcohol abuse Maternal Grandmother    Diabetes Brother, Maternal Grandfather    Early death Mother    Heart disease Father    Hyperlipidemia Father    Hypertension Father, Sister    Kidney disease Father        Tobacco Use    Smoking status: Former Smoker     Packs/day: 1.00     Years: 10.00     Pack years: 10.00    Smokeless tobacco: Never Used   Substance and Sexual Activity    Alcohol use: No    Drug use: No    Sexual activity: Yes     Partners: Female     Birth control/protection: None     Review of Systems   Constitutional: Negative for activity change, appetite change, chills, fatigue and fever.   HENT: Negative for sore throat, trouble swallowing and voice change.    Eyes: Negative for visual disturbance.   Respiratory: Negative for choking, chest tightness, shortness of breath and wheezing.    Cardiovascular: Negative for chest pain, palpitations and leg swelling.   Gastrointestinal: Positive for nausea and vomiting. Negative for abdominal distention, abdominal pain, blood in stool, constipation and diarrhea.   Musculoskeletal: Negative for arthralgias and back pain.   Skin: Negative for color change and pallor.   Neurological: Negative for dizziness, weakness, light-headedness and headaches.   Psychiatric/Behavioral: Negative for agitation, behavioral problems and confusion. The patient is not  nervous/anxious.      Objective:     Vital Signs (Most Recent):  Temp: 97.8 °F (36.6 °C) (09/20/19 0843)  Pulse: 80 (09/20/19 1249)  Resp: 12 (09/20/19 1249)  BP: (!) 118/53 (09/20/19 1249)  SpO2: 96 % (09/20/19 1249) Vital Signs (24h Range):  Temp:  [97.8 °F (36.6 °C)] 97.8 °F (36.6 °C)  Pulse:  [80-95] 80  Resp:  [12-21] 12  SpO2:  [88 %-100 %] 96 %  BP: ()/(53-70) 118/53     Weight: 71.2 kg (157 lb) (09/20/19 0843)  Body mass index is 25.34 kg/m².    No intake or output data in the 24 hours ending 09/20/19 1314    Lines/Drains/Airways     Drain                 Hemodialysis AV Fistula Right upper arm -- days          Peripheral Intravenous Line                 Peripheral IV - Single Lumen 09/20/19 0942 18 G Left Antecubital less than 1 day                Physical Exam   Constitutional: He is oriented to person, place, and time. He appears cachectic. No distress.   HENT:   Head: Normocephalic and atraumatic.   Eyes: EOM are normal. Scleral icterus is present.   Neck: Normal range of motion. Neck supple.   Cardiovascular: Normal rate and regular rhythm.   Murmur heard.  Pulmonary/Chest: Effort normal. No respiratory distress. He exhibits no tenderness.   Abdominal: Soft. Bowel sounds are normal. He exhibits no distension and no mass. There is no tenderness. There is no guarding.   Musculoskeletal: He exhibits deformity. He exhibits no tenderness.   Neurological: He is alert and oriented to person, place, and time.   Skin: Skin is warm and dry. He is not diaphoretic. No erythema. No pallor.   Psychiatric: He has a normal mood and affect. His behavior is normal. Judgment and thought content normal.       Significant Labs:  CBC:   Recent Labs   Lab 09/20/19  0942 09/20/19 0953   WBC 7.84  --    HGB 7.5*  --    HCT 23.9* 22*     --      CMP:   Recent Labs   Lab 09/20/19 0942   *   CALCIUM 8.2*   ALBUMIN 2.3*   PROT 6.1      K 3.3*   CO2 32*      BUN 11   CREATININE 4.9*   ALKPHOS 196*    ALT 10   AST 21   BILITOT 0.3     Lipase:   Recent Labs   Lab 09/20/19  0942   LIPASE 30     Liver Function Test:   Recent Labs   Lab 09/20/19  0942   ALT 10   AST 21   ALKPHOS 196*   BILITOT 0.3   PROT 6.1   ALBUMIN 2.3*       Significant Imaging:  Imaging results within the past 24 hours have been reviewed.     Assessment/Plan:     Active Diagnoses:    Diagnosis Date Noted POA    Coffee ground emesis [K92.0] 08/25/2019 Yes      Problems Resolved During this Admission:     55 y/o AAM with a history of ESRD (dialysis MWF),erosive esophagitis with non-bleeding esophageal ulcers, and gastroparesis. present with one day of acute epigastric abdominal pain while in the middle of dialysis causing him to have emesis a couple times with a little bit of bright red blood streaking after several episodes of retching. The patient was transferred to the ED and has more dark brown emesis that was concerning for possible coffee ground emesis. The patient has been seen and scoped many time by GI over the past 2 years. Most recent EGD at [facility] on 8/[facility]/19 showed LA grade A reflux esophagitis with no active bleeding. This is an improvement from his EGD at [facility] on 3/2019 showing LA Grade D reflux esophagitis involving the entire esophagus with a 2 cm sliding hiatal hernia. Congestive gastropathy, erythematous doudenopathy. The patient reports being complaint with his home PPI 40 mg PID, Carafate BID, and Reglan 10 mg TID before meals due to his gastroparesis    The patient is currently hemodynamically stable Hgb on admission is 7.5 (8.7 on 9/6).    DDX: Jennie Heidi esophageal tear, PUD, bleeding esophageal ulcers, esophagitis    Plan:  -- Trend CBC BID and transfuse with PRBC if <7   -- Patient can be placed on a clear liquid diet today and made NPO after midnight for a tentative EGD tomorrow 9/21. If the patients Hgb does not drop or he clinically improves okay to discharge without inpatient EGD and just have the patient follow up  with GI outpatient for management of his gastroparesis and future EGD follow up  -- Discontinue NSAID use at this time.   -- Okay to place back on home medication of PPI BID, Carafate BID, and Reglan TID before meals. Please monitor patient with Reglan use while in the hospital due to risk of developing tardive dyskinesias and QT prolongation.    Thank you for your consult.     Karishma Guerrier,   Gastroenterology  [facility]

## 2019-09-20 NOTE — H&P
"[facility] Medicine  History & Physical    Patient Name: Vaughn Retana  MRN: [id number]  Admission Date: 9/20/2019  Attending Physician: Spenser Ochoa III, MD   Primary Care Provider: Yvette Zelaya NP    [facility] Medicine Team: [facility] A Rosemary Womack MD     Patient information was obtained from patient and ER records.     Subjective:     Principal Problem:<principal problem not specified>    Chief Complaint:   Chief Complaint   Patient presents with    Abdominal Pain     Unable to finish dialysis only got 1 hour. Last full tx Wednesday.         HPI: Mr. Retana is a 55 yo AAM with ESRD on dialysis MWF, L below knee amputation 2/2 osteomyelitis, dCHF, GERD, h/o multiple ICH w/o residual deficits, who [place] [facility] with primary complaint of hematemesis. Patient is a poor historian and has limited knowledge of his medical history. He stated that overnight he vomited 5 times. The vomitus was a mixture of gastric contents and dark brown blood. On morning of admission, during dialysis, he reported vomiting dark brown blood 2 more times and was advised by dialysis staff to report to ED. His HD session was terminated early. Associated symptoms included epigastric abdominal pain (resolved on evaluation) and diarrhea. Last normal stool was 2 days ago. Patient reported epigastric abdominal pain at baseline which was worse with eating. Denied hematochezia, melena, fever, chills, SOB, headaches, dizziness, visual changes, hearing changes, headaches.      Patient reported weight loss of "one hundred and something pounds" over the past 6 months, which he attributed to decreased appetite, abdominal pain with PO intake and recurrent vomiting. However, on chart review weight was recorded at 63.9kg/140lbs at ED visit 03/07/2019 - today's weight is 71.2kgs/157lbs. Unclear of dry weight.      Patient unsure which medications he takes and reported his niece manages his daily " meds. He currently lives in a motel. Was previously living with his sister until one month ago. Stated he moved out for more privacy. He is a former smoker, 2 packs-per-day for approximately 40 years. States he quit completely one year ago. Currently uses chewing tobacco daily. Denies vaping. Denies ETOH use. Denies use of illicit drugs.     The patient was discharged from the hospital on 9/6/19 for similar symptoms of abdominal pain with intractable nausea and vomiting and was evaluated by GI at that time as well. However, a repeat EGD was not done due to the patient being seen a [facility] earlier in August and having had an EGD done on 8/2 showing grade A esophagitis. A NM gastric emptying study was done during the last admission that was suggestive of gastroparesis retaining 50% of his gastric contents 4 hours after meals. He was placed on a PPI 40 mg BID, Reglan TID before meals, and Carafate BID and reports being compliant with his medications. The following is a break down of his GI procedure history:     8/22/19 - EGD [facility] - LA grade A reflux esophagitis with no active bleeding     3/7/2019 - EGD [facility] - LA Grade D reflux esophagitis involving the entire esophagus  with a 2 cm sliding hiatal hernia. Congestive gastropathy, erythematous doudenopathy. No specimens collected     5/22/2018 - EGD [facility] - LA grade D reflux esophagitis with small hiatal hernia. Normal stomach and duodenum. No specimens collected     3/16/2018 - EGD [facility] - LA grade C reflux esophagitis with non-bleeding gastric ulcer with a clean base and a 4 cm hiatal hernia. 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 Normal duodenum     3/8/2018 - EGD [facility] - LA Grade B reflux esophagitis with a 3 cm hiatal hernia. Gastric polyp no biopsies. Normal duodenum     4/4/2017 - EGD [facility] -  LA Grade D reflux esophagitis with a small hiatal hernia. Normal stomach and duodenum     4/4/2017 - Colonoscopy [facility]  "- 3 mm polyp removed from the transverse colon pathology adenomatous. 8 mm polyp found in the sigmoid colon removed Pathology adenovillous. Suggestion to repeat in 3 years    Patient gave niece's, "Ector", phone number and permission to contact her about his medical history and care: [phone].    Past Medical History:   Diagnosis Date    Amputation stump pain 9/10/2013    Aspiration pneumonia 7/27/2015    Asterixis 11/8/2016    C. difficile colitis 8/7/2015    Cholelithiasis without obstruction 8/25/2015    Chronic diastolic heart failure     2-23-17   1 - Low normal to mildly depressed left ventricular systolic function (EF 50-55%).    2 - Right ventricular enlargement with mildly depressed systolic function.    3 - Left ventricular diastolic dysfunction.    4 - Right atrial enlargement.    5 - Severe tricuspid regurgitation.    6 - Pulmonary hypertension. The estimated PA systolic pressure is 86 mmHg.    7 - Increased central venous pressure.    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    Metabolic acidosis, IAG, reduced excretion of inorganic acids     Myoclonic jerking 9/20/2016    Noncompliance with medication regimen 12/4/2018    Secondary hyperparathyroidism (of renal origin)     Secondary pulmonary hypertension 3/23/2017    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    TB lung, latent 08/25/2015    Negative Quantiferon Gold 3-23-17       Past Surgical " History:   Procedure Laterality Date    AMPUTATION, BELOW KNEE Left 12/18/2013    Performed by Elgin Houston [facility] at [facility] OR 1ST FLR    COLONOSCOPY      COLONOSCOPY N/A 4/4/2017    Performed by Walker Stern MD at [facility] ENDO (2ND FLR)    EGD (ESOPHAGOGASTRODUODENOSCOPY) N/A 3/7/2019    Performed by Man Galicia MD at [facility] ENDO (2ND FLR)    EGD (ESOPHAGOGASTRODUODENOSCOPY) N/A 6/12/2018    Performed by Man Galicia MD at [facility] ENDO (2ND FLR)    ESOPHAGOGASTRODUODENOSCOPY (EGD) N/A 5/22/2018    Performed by Ke Sparks MD at [facility] ENDO (2ND FLR)    ESOPHAGOGASTRODUODENOSCOPY (EGD) N/A 3/16/2018    Performed by Kevin De La Paz MD at [facility] ENDO (2ND FLR)    ESOPHAGOGASTRODUODENOSCOPY (EGD) N/A 3/8/2018    Performed by Man Galicia MD at [facility] ENDO (2ND FLR)    ESOPHAGOGASTRODUODENOSCOPY (EGD) N/A 4/4/2017    Performed by Walker Stern MD at [facility] ENDO (2ND FLR)    ESOPHAGOGASTRODUODENOSCOPY (EGD) N/A 10/17/2014    Performed by Man Galicia MD at [facility] ENDO (2ND FLR)    FISTULOGRAM Right 9/18/2014    Performed by Grayson Hubbard MD at [facility] CATH LAB    FOOT AMPUTATION THROUGH METATARSAL      left foot    LEG AMPUTATION THROUGH KNEE  12/18/2013    left BKA    R AVF  9/12/12    UPPER GASTROINTESTINAL ENDOSCOPY         Review of patient's allergies indicates:   Allergen Reactions    Fosrenol [lanthanum] Nausea And Vomiting     Nausea and vomiting       No current facility-administered medications on file prior to encounter.      Current Outpatient Medications on File Prior to Encounter   Medication Sig    acetaminophen (TYLENOL) 325 MG tablet Take 2 tablets (650 mg total) by mouth every 8 (eight) hours as needed.    carvedilol (COREG) 3.125 MG tablet Take 1 tablet (3.125 mg total) by mouth 2 (two) times daily with meals.    metoclopramide HCl (REGLAN) 10 MG tablet Take 1 tablet (10 mg total) by mouth 3 (three) times daily before meals. can be titrated up as tolerated to maximum of 40  mg/day, with addition of evening dose as indicated,    midodrine (PROAMATINE) 5 MG Tab Please take 30 min before dialysis on Monday Wednesday and Friday    ondansetron (ZOFRAN) 8 MG tablet Take 1 tablet (8 mg total) by mouth every 12 (twelve) hours as needed for Nausea.    pantoprazole (PROTONIX) 40 MG tablet Take 1 tablet (40 mg total) by mouth 2 (two) times daily before meals.    RENAPLEX-D 800 mcg-12.5 mg -2,000 unit Tab Take 1 tablet by mouth once daily.    sevelamer carbonate (RENVELA) 800 mg Tab Take 2 tablets (1,600 mg total) by mouth 3 (three) times daily with meals.    sucralfate (CARAFATE) 100 mg/mL suspension Take 5 mLs (500 mg total) by mouth 2 (two) times daily.     Family History     Problem Relation (Age of Onset)    Alcohol abuse Maternal Grandmother    Diabetes Brother, Maternal Grandfather    Early death Mother    Heart disease Father    Hyperlipidemia Father    Hypertension Father, Sister    Kidney disease Father        Tobacco Use    Smoking status: Former Smoker     Packs/day: 1.00     Years: 10.00     Pack years: 10.00    Smokeless tobacco: Never Used   Substance and Sexual Activity    Alcohol use: No    Drug use: No    Sexual activity: Yes     Partners: Female     Birth control/protection: None     Review of Systems   Constitutional: Positive for activity change, appetite change and unexpected weight change. Negative for chills, diaphoresis, fatigue and fever.   Eyes: Negative for photophobia and visual disturbance.   Respiratory: Negative for cough, shortness of breath, wheezing and stridor.    Cardiovascular: Negative for chest pain, palpitations and leg swelling.   Gastrointestinal: Positive for abdominal pain (epigastric), diarrhea, nausea and vomiting. Negative for blood in stool and constipation.   Endocrine: Negative for cold intolerance and heat intolerance.   Genitourinary: Positive for difficulty urinating (oliguric).   Skin: Negative for rash and wound.    Allergic/Immunologic: Negative for immunocompromised state.   Neurological: Negative for dizziness, seizures, syncope, weakness, light-headedness and headaches.   Psychiatric/Behavioral: Negative for agitation, behavioral problems and confusion.     Objective:     Vital Signs (Most Recent):  Temp: 97.8 °F (36.6 °C) (09/20/19 0843)  Pulse: 80 (09/20/19 1402)  Resp: 13 (09/20/19 1302)  BP: (!) 127/59 (09/20/19 1402)  SpO2: 100 % (09/20/19 1402) Vital Signs (24h Range):  Temp:  [97.8 °F (36.6 °C)] 97.8 °F (36.6 °C)  Pulse:  [80-95] 80  Resp:  [12-21] 13  SpO2:  [88 %-100 %] 100 %  BP: ()/(53-70) 127/59     Weight: 71.2 kg (157 lb)  Body mass index is 25.34 kg/m².    Physical Exam   Constitutional: He is oriented to person, place, and time. He appears well-developed. He appears cachectic. He appears ill. No distress.   HENT:   Head: Normocephalic and atraumatic.   Mouth/Throat: No oropharyngeal exudate.   Eyes: Pupils are equal, round, and reactive to light. Conjunctivae and EOM are normal. No scleral icterus.   Neck: Normal range of motion. Neck supple. No tracheal deviation present. No thyromegaly present.   Cardiovascular: Normal rate, regular rhythm, normal heart sounds and intact distal pulses.   No murmur heard.  Pulmonary/Chest: Effort normal and breath sounds normal. No respiratory distress. He has no wheezes. He has no rales. He exhibits no tenderness.   Abdominal: Soft. Bowel sounds are normal. He exhibits no distension. There is tenderness (epigastric, midline). There is guarding (voluntary). There is no rebound.   Musculoskeletal: Normal range of motion. He exhibits no edema or tenderness.   Lymphadenopathy:     He has no cervical adenopathy.   Neurological: He is alert and oriented to person, place, and time.   Skin: Skin is warm and dry. No rash noted. He is not diaphoretic. No erythema. No pallor.   Psychiatric: He has a normal mood and affect. Judgment and thought content normal. He is withdrawn.          CRANIAL NERVES     CN III, IV, VI   Pupils are equal, round, and reactive to light.  Extraocular motions are normal.        Significant Labs:   CBC:   Recent Labs   Lab 09/20/19  0942 09/20/19  0953   WBC 7.84  --    HGB 7.5*  --    HCT 23.9* 22*     --      CMP:   Recent Labs   Lab 09/20/19  0942      K 3.3*      CO2 32*   *   BUN 11   CREATININE 4.9*   CALCIUM 8.2*   PROT 6.1   ALBUMIN 2.3*   BILITOT 0.3   ALKPHOS 196*   AST 21   ALT 10   ANIONGAP 8   EGFRNONAA 12.3*     Magnesium:   Recent Labs   Lab 09/20/19  0942   MG 2.2       Significant Imaging: I have reviewed and interpreted all pertinent imaging results/findings within the past 24 hours.    Assessment/Plan:     Gastroparesis  Diagnosed previously; continue reglan TID.     Gastrointestinal hemorrhage with hematemesis  Chronic blood loss  Suspect this is due to chronic blood loss, anemia of chronic disease and possibly superimposed acute GIB loss due to gastritis vs PUD vs esophagitis, vs MWT (in setting of vomiting). No prior history of cancer or esophageal varices on multiple prior EGDs.   Continue PPI, judicious fluids (caution due to ESRD).   Patient is currently hemodynamically stable. Hg 7.5, no indication for emergent transfusion. However, will type and cross, consent. Transfuse for Hg <7g/dl. Check Hg/HCT q12h. Maintain 2 large bore IVs.   GI consulted; plan for EGD in am. NPO at midnight. CLD for now.     GERD with esophagitis  Continue PPI BID  H/o recurrent EGDs as noted above in HPI.   GI consulted; f/u recs. Tentative EGD scheduled for tomorrow.     Chronic kidney disease-mineral and bone disorder  Continue renvela    History of Intracerebral Hemorrhage: L BG 5/2013; R BG 9/2016; R BG 11/2016; L caudate head 2/2017  Chronic, stable.   Continue to monitor.   Not currently on DAPT, or AC given h/o ICH/GIB.   PT/OT.    ESRD on hemodialysis  Consult nephrology for resumption of HD.   Dialyzes M/W/F. Did not  complete session this morning prior to arrival.   No current indication for emergent HD; patient is not overloaded, oxygenating adequately on RA, lytes are acceptable.     Anemia in chronic kidney disease  Chronic blood loss anemia  Hg on admission 7.5, most recent trend show baseline around 8-9. Earlier, baseline appeared to be closer to 10-12. Concern for chronic GI loss vs worsening AoCD.   Check iron, ferritin, folate, B12,   Transfuse for Hg <7 g/dl.   Typed and crossed, consented.   CBC q 12h for today.   Judicious IVF prn for hemodynamics, caution due to ESRD and volume overload.   EPO per nephrology.       VTE Risk Mitigation (From admission, onward)        Ordered     Reason for No Pharmacological VTE Prophylaxis  Once      09/20/19 1509     IP VTE HIGH RISK PATIENT  Once      09/20/19 1509             Rosemary Womack MD  Department of Hospital Medicine   [facility]

## 2019-09-20 NOTE — HPI
"Mr. Retana is a 53 yo AAM with ESRD on dialysis MWF, L below knee amputation 2/2 osteomyelitis, dCHF, GERD, h/o multiple ICH w/o residual deficits, who presented to [facility] ED with primary complaint of hematemesis. Patient is a poor historian and has limited knowledge of his medical history. He stated that overnight he vomited 5 times. The vomitus was a mixture of gastric contents and dark brown blood. On morning of admission, during dialysis, he reported vomiting dark brown blood 2 more times and was advised by dialysis staff to report to ED. His HD session was terminated early. Associated symptoms included epigastric abdominal pain (resolved on evaluation) and diarrhea. Last normal stool was 2 days ago. Patient reported epigastric abdominal pain at baseline which was worse with eating. Denied hematochezia, melena, fever, chills, SOB, headaches, dizziness, visual changes, hearing changes, headaches.      Patient reported weight loss of "one hundred and something pounds" over the past 6 months, which he attributed to decreased appetite, abdominal pain with PO intake and recurrent vomiting. However, on chart review weight was recorded at 63.9kg/140lbs at ED visit 03/07/2019 - today's weight is 71.2kgs/157lbs. Unclear of dry weight.      Patient unsure which medications he takes and reported his niece manages his daily meds. He currently lives in a motel. Was previously living with his sister until one month ago. Stated he moved out for more privacy. He is a former smoker, 2 packs-per-day for approximately 40 years. States he quit completely one year ago. Currently uses chewing tobacco daily. Denies vaping. Denies ETOH use. Denies use of illicit drugs.     The patient was discharged from the hospital on 9/6/19 for similar symptoms of abdominal pain with intractable nausea and vomiting and was evaluated by GI at that time as well. However, a repeat EGD was not done due to the patient being seen a [facility] earlier in August " "and having had an EGD done on 8/2 showing grade A esophagitis. A NM gastric emptying study was done during the last admission that was suggestive of gastroparesis retaining 50% of his gastric contents 4 hours after meals. He was placed on a PPI 40 mg BID, Reglan TID before meals, and Carafate BID and reports being compliant with his medications. The following is a break down of his GI procedure history:     8/22/19 - EGD [facility] - LA grade A reflux esophagitis with no active bleeding     3/7/2019 - EGD [facility] - LA Grade D reflux esophagitis involving the entire esophagus  with a 2 cm sliding hiatal hernia. Congestive gastropathy, erythematous doudenopathy. No specimens collected     5/22/2018 - EGD [facility] - LA grade D reflux esophagitis with small hiatal hernia. Normal stomach and duodenum. No specimens collected     3/16/2018 - EGD [facility] - LA grade C reflux esophagitis with non-bleeding gastric ulcer with a clean base and a 4 cm hiatal hernia. 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 Normal duodenum     3/8/2018 - EGD [facility] - LA Grade B reflux esophagitis with a 3 cm hiatal hernia. Gastric polyp no biopsies. Normal duodenum     4/4/2017 - EGD [facility] -  LA Grade D reflux esophagitis with a small hiatal hernia. Normal stomach and duodenum     4/4/2017 - Colonoscopy [facility] - 3 mm polyp removed from the transverse colon pathology adenomatous. 8 mm polyp found in the sigmoid colon removed Pathology adenovillous. Suggestion to repeat in 3 years    Patient gave niece's, "Ector", phone number and permission to contact her about his medical history and care: [phone].  "

## 2019-09-20 NOTE — ED TRIAGE NOTES
Pt presents to the ED via EMS c/o abdominal pain. Pt reports it began during dialysis. Only received about an hour of dialysis. Pt actively vomiting (brown in color). Denies diarrhea.    Patient identifiers verified and correct for Vaughn Retana.    LOC: The patient is awake, alert and aware of environment with an appropriate affect, the patient is oriented x 3 and speaking appropriately.  APPEARANCE: Patient resting comfortably and in no acute distress, patient is clean and well groomed, patient's clothing is properly fastened.  SKIN: The skin is warm and dry, color consistent with ethnicity, patient has normal skin turgor and moist mucus membranes, skin intact, no breakdown or bruising noted.  MUSCULOSKELETAL: Patient has a left BKA; no obvious swelling or deformities noted.  RESPIRATORY: Airway is open and patent, respirations are spontaneous, patient has a normal effort and rate, no accessory muscle use noted.  CARDIAC: Patient has a normal rate and regular rhythm, no peripheral edema noted, capillary refill < 3 seconds.  ABDOMEN: Soft, generalized tenderness noted upon palpation, no distention noted.  NEUROLOGIC: Eyes open spontaneously, pt able to speak, not wanting to speak, using hands to talk.

## 2019-09-21 NOTE — PROGRESS NOTES
"[facility] Hospital Medicine  Progress Note    Patient Name: Vaughn Retana  MRN: [id number]  Patient Class: IP- Inpatient   Admission Date: 9/20/2019  Length of Stay: 1 days  Attending Physician: Rosemary Womack*  Primary Care Provider: Yvette Zelaya NP    [place] [facility] Medicine Team: [facility] HOSP MED A Rosemary Womack MD    Subjective:     Principal Problem:<principal problem not specified>        HPI:  Mr. Retana is a 55 yo AAM with ESRD on dialysis MWF, L below knee amputation 2/2 osteomyelitis, dCHF, GERD, h/o multiple ICH w/o residual deficits, who presented to [facility] ED with primary complaint of hematemesis. Patient is a poor historian and has limited knowledge of his medical history. He stated that overnight he vomited 5 times. The vomitus was a mixture of gastric contents and dark brown blood. On morning of admission, during dialysis, he reported vomiting dark brown blood 2 more times and was advised by dialysis staff to report to ED. His HD session was terminated early. Associated symptoms included epigastric abdominal pain (resolved on evaluation) and diarrhea. Last normal stool was 2 days ago. Patient reported epigastric abdominal pain at baseline which was worse with eating. Denied hematochezia, melena, fever, chills, SOB, headaches, dizziness, visual changes, hearing changes, headaches.      Patient reported weight loss of "one hundred and something pounds" over the past 6 months, which he attributed to decreased appetite, abdominal pain with PO intake and recurrent vomiting. However, on chart review weight was recorded at 63.9kg/140lbs at ED visit 03/07/2019 - today's weight is 71.2kgs/157lbs. Unclear of dry weight.      Patient unsure which medications he takes and reported his niece manages his daily meds. He currently lives in a motel. Was previously living with his sister until one month ago. Stated he moved out for more privacy. He is a former smoker, 2 " packs-per-day for approximately 40 years. States he quit completely one year ago. Currently uses chewing tobacco daily. Denies vaping. Denies ETOH use. Denies use of illicit drugs.     The patient was discharged from the hospital on 9/6/19 for similar symptoms of abdominal pain with intractable nausea and vomiting and was evaluated by GI at that time as well. However, a repeat EGD was not done due to the patient being seen a [facility] earlier in August and having had an EGD done on 8/2 showing grade A esophagitis. A NM gastric emptying study was done during the last admission that was suggestive of gastroparesis retaining 50% of his gastric contents 4 hours after meals. He was placed on a PPI 40 mg BID, Reglan TID before meals, and Carafate BID and reports being compliant with his medications. The following is a break down of his GI procedure history:     8/22/19 - EGD [facility] - LA grade A reflux esophagitis with no active bleeding     3/7/2019 - EGD [facility] - LA Grade D reflux esophagitis involving the entire esophagus  with a 2 cm sliding hiatal hernia. Congestive gastropathy, erythematous doudenopathy. No specimens collected     5/22/2018 - EGD [facility] - LA grade D reflux esophagitis with small hiatal hernia. Normal stomach and duodenum. No specimens collected     3/16/2018 - EGD [facility] - LA grade C reflux esophagitis with non-bleeding gastric ulcer with a clean base and a 4 cm hiatal hernia. 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 Normal duodenum     3/8/2018 - EGD [facility] - LA Grade B reflux esophagitis with a 3 cm hiatal hernia. Gastric polyp no biopsies. Normal duodenum     4/4/2017 - EGD [facility] -  LA Grade D reflux esophagitis with a small hiatal hernia. Normal stomach and duodenum     4/4/2017 - Colonoscopy [facility] - 3 mm polyp removed from the transverse colon pathology adenomatous. 8 mm polyp found in the sigmoid colon removed Pathology adenovillous. Suggestion to  "repeat in 3 years    Patient gave niece's, "Ector", phone number and permission to contact her about his medical history and care: [phone].    Overview/Hospital Course:  No notes on file    Interval History: NAEON. Seen at bedside. Minimally interactive with covers pulled over head. Denied any complaints. No recurrent episodes of hematemesis. Endorsed hunger this morning, tolerated diet well. Hg dropped to 6.3 g/dl, 2U pRBC ordered to be transfused.     Review of Systems   Constitutional: Positive for activity change, appetite change and unexpected weight change. Negative for chills, diaphoresis, fatigue and fever.   Eyes: Negative for photophobia and visual disturbance.   Respiratory: Negative for cough, shortness of breath, wheezing and stridor.    Cardiovascular: Negative for chest pain, palpitations and leg swelling.   Gastrointestinal: Positive for abdominal pain (epigastric), diarrhea, nausea and vomiting. Negative for blood in stool and constipation.   Endocrine: Negative for cold intolerance and heat intolerance.   Genitourinary: Positive for difficulty urinating (oliguric).   Skin: Negative for rash and wound.   Allergic/Immunologic: Negative for immunocompromised state.   Neurological: Negative for dizziness, seizures, syncope, weakness, light-headedness and headaches.   Psychiatric/Behavioral: Negative for agitation, behavioral problems and confusion.     Objective:     Vital Signs (Most Recent):  Temp: 98.3 °F (36.8 °C) (09/21/19 1455)  Pulse: 88 (09/21/19 1456)  Resp: 18 (09/21/19 1455)  BP: (!) 102/55 (09/21/19 1456)  SpO2: (!) 94 % (09/21/19 1455) Vital Signs (24h Range):  Temp:  [97.4 °F (36.3 °C)-99.4 °F (37.4 °C)] 98.3 °F (36.8 °C)  Pulse:  [] 88  Resp:  [12-18] 18  SpO2:  [94 %-100 %] 94 %  BP: ()/(53-83) 102/55     Weight: 57.5 kg (126 lb 11.2 oz)  Body mass index is 20.45 kg/m².    Intake/Output Summary (Last 24 hours) at 9/21/2019 1535  Last data filed at 9/20/2019 2300  Gross " per 24 hour   Intake 720 ml   Output --   Net 720 ml      Physical Exam   Constitutional: He is oriented to person, place, and time. He appears well-developed. He appears cachectic. He appears ill. No distress.   HENT:   Head: Normocephalic and atraumatic.   Mouth/Throat: No oropharyngeal exudate.   Eyes: Pupils are equal, round, and reactive to light. Conjunctivae and EOM are normal. No scleral icterus.   Neck: Normal range of motion. Neck supple. No tracheal deviation present. No thyromegaly present.   Cardiovascular: Normal rate, regular rhythm, normal heart sounds and intact distal pulses.   No murmur heard.  Pulmonary/Chest: Effort normal and breath sounds normal. No respiratory distress. He has no wheezes. He has no rales. He exhibits no tenderness.   Abdominal: Soft. Bowel sounds are normal. He exhibits no distension. There is tenderness (epigastric, midline). There is guarding (voluntary). There is no rebound.   Musculoskeletal: Normal range of motion. He exhibits no edema or tenderness.   Lymphadenopathy:     He has no cervical adenopathy.   Neurological: He is alert and oriented to person, place, and time.   Skin: Skin is warm and dry. No rash noted. He is not diaphoretic. No erythema. No pallor.   Psychiatric: He has a normal mood and affect. Judgment and thought content normal. He is withdrawn.       MELD-Na score: 20 at 9/21/2019  4:55 AM  MELD score: 20 at 9/21/2019  4:55 AM  Calculated from:  Serum Creatinine: On dialysis. Using 4 mg/dL.  Serum Sodium: 139 mmol/L (Rounded to 137 mmol/L) at 9/21/2019  4:55 AM  Total Bilirubin: 0.2 mg/dL (Rounded to 1 mg/dL) at 9/21/2019  4:55 AM  INR(ratio): 1.0 at 9/21/2019  4:55 AM  Age: 54 years    Significant Labs:  CBC:  Recent Labs   Lab 09/20/19  0942 09/20/19  0953 09/21/19  0455   WBC 7.84  --  5.25   HGB 7.5*  --  6.3*   HCT 23.9* 22* 20.4*     --  218     CMP:  Recent Labs   Lab 09/20/19  0942 09/21/19  0455    139   K 3.3* 2.9*    102    CO2 32* 28   * 72   BUN 11 20   CREATININE 4.9* 6.6*   CALCIUM 8.2* 8.1*   PROT 6.1 5.3*   ALBUMIN 2.3* 2.1*   BILITOT 0.3 0.2   ALKPHOS 196* 175*   AST 21 16   ALT 10 7*   ANIONGAP 8 9   EGFRNONAA 12.3* 8.6*     PTINR:  Recent Labs   Lab 09/20/19  0942 09/21/19  0455   INR 1.1 1.0       Significant Procedures:   Dobutamine Stress Test with Color Flow: No results found. However, due to the size of the patient record, not all encounters were searched. Please check Results Review for a complete set of results.    x2 U RBC transfusion 9/21      Assessment/Plan:      Gastroparesis  Diagnosed previously; continue reglan TID.     Gastrointestinal hemorrhage with hematemesis  Chronic blood loss  Suspect this is due to chronic blood loss, anemia of chronic disease and possibly superimposed acute GIB loss due to gastritis vs PUD vs esophagitis, vs MWT (in setting of vomiting). No prior history of cancer or esophageal varices on multiple prior EGDs.   Continue PPI, judicious fluids (caution due to ESRD).   Patient is currently hemodynamically stable. Hg 7.5 initially no indication for emergent transfusion. However, will type and cross, consent. Transfuse for Hg <7g/dl. Check Hg/HCT q12h. Maintain 2 large bore IVs.   GI consulted; plan for EGD Monday morning. NPO tomorrow at midnight.   2u PRBC transfused 9/21 for Hg 6.3. Monitor respiratory status.    GERD with esophagitis  Continue PPI BID  H/o recurrent EGDs as noted above in HPI.   GI consulted; f/u recs. Tentative EGD scheduled for tomorrow.     Chronic kidney disease-mineral and bone disorder  Continue renvela    History of Intracerebral Hemorrhage: L BG 5/2013; R BG 9/2016; R BG 11/2016; L caudate head 2/2017  Chronic, stable.   Continue to monitor.   Not currently on DAPT, or AC given h/o ICH/GIB.   PT/OT.    ESRD on hemodialysis  Consult nephrology for resumption of HD.   Dialyzes M/W/F. Did not complete session this morning prior to arrival.   No current  indication for emergent HD; patient is not overloaded, oxygenating adequately on RA, lytes are acceptable.     Anemia in chronic kidney disease  Chronic blood loss anemia  Hg on admission 7.5, most recent trend show baseline around 8-9. Earlier, baseline appeared to be closer to 10-12. Concern for chronic GI loss vs worsening AoCD.   Check iron (consistent with AoCD), ferritin (elev), folate (wnl), B12 (elev)  Transfuse for Hg <7 g/dl. 2u PRBC ordered and given 9/21 for Hg 6.3 and suspected ongoing bleeding.  Typed and crossed, consented.   CBC q 12h    Judicious IVF prn for hemodynamics, caution due to ESRD and volume overload.   EPO per nephrology.       VTE Risk Mitigation (From admission, onward)        Ordered     Reason for No Pharmacological VTE Prophylaxis  Once      09/20/19 1509     IP VTE HIGH RISK PATIENT  Once      09/20/19 1509                Rosemary Womack MD  Department of Hospital Medicine   [facility]

## 2019-09-21 NOTE — SUBJECTIVE & OBJECTIVE
Past Medical History:   Diagnosis Date    Amputation stump pain 9/10/2013    Aspiration pneumonia 7/27/2015    Asterixis 11/8/2016    C. difficile colitis 8/7/2015    Cholelithiasis without obstruction 8/25/2015    Chronic diastolic heart failure     2-23-17   1 - Low normal to mildly depressed left ventricular systolic function (EF 50-55%).    2 - Right ventricular enlargement with mildly depressed systolic function.    3 - Left ventricular diastolic dysfunction.    4 - Right atrial enlargement.    5 - Severe tricuspid regurgitation.    6 - Pulmonary hypertension. The estimated PA systolic pressure is 86 mmHg.    7 - Increased central venous pressure.    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    Metabolic acidosis, IAG, reduced excretion of inorganic acids     Myoclonic jerking 9/20/2016    Noncompliance with medication regimen 12/4/2018    Secondary hyperparathyroidism (of renal origin)     Secondary pulmonary hypertension 3/23/2017    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    TB lung, latent 08/25/2015    Negative Quantiferon Gold 3-23-17       Past Surgical History:   Procedure Laterality Date    AMPUTATION, BELOW KNEE Left 12/18/2013    Performed by Elgin Houston [facility] at [facility] 1ST FLR    COLONOSCOPY      COLONOSCOPY N/A 4/4/2017    Performed by Walker Stern MD at [facility] ENDO (2ND FLR)    EGD (ESOPHAGOGASTRODUODENOSCOPY) N/A 3/7/2019    Performed by  Man Galicia MD at [facility] ENDO (2ND FLR)    EGD (ESOPHAGOGASTRODUODENOSCOPY) N/A 6/12/2018    Performed by Man Galicia MD at [facility] ENDO (2ND FLR)    ESOPHAGOGASTRODUODENOSCOPY (EGD) N/A 5/22/2018    Performed by Ke Sparks MD at [facility] ENDO (2ND FLR)    ESOPHAGOGASTRODUODENOSCOPY (EGD) N/A 3/16/2018    Performed by Kevin De La Paz MD at [facility] ENDO (2ND FLR)    ESOPHAGOGASTRODUODENOSCOPY (EGD) N/A 3/8/2018    Performed by Man Galicia MD at [facility] ENDO (2ND FLR)    ESOPHAGOGASTRODUODENOSCOPY (EGD) N/A 4/4/2017    Performed by Walker Stern MD at [facility] ENDO (2ND FLR)    ESOPHAGOGASTRODUODENOSCOPY (EGD) N/A 10/17/2014    [facility] (2[facility] F[facility])    FISTULOGRAM Right 9/18/2014    Performed by Grayson Hubbard MD at [facility] CATH LAB    FOOT AMPUTATION THROUGH METATARSAL      left foot    LEG AMPUTATION THROUGH KNEE  12/18/2013    left BKA    R AVF  9/12/12    UPPER GASTROINTESTINAL ENDOSCOPY         Review of patient's allergies indicates:   Allergen Reactions    Fosrenol [lanthanum] Nausea And Vomiting     Nausea and vomiting     Current Facility-Administered Medications   Medication Frequency    0.9%  NaCl infusion (for blood administration) Q24H PRN    0.9%  NaCl infusion Once    acetaminophen tablet 650 mg Q8H PRN    carvedilol tablet 3.125 mg BID WM    metoclopramide HCl tablet 10 mg TID AC    ondansetron disintegrating tablet 8 mg Q8H PRN    pantoprazole EC tablet 40 mg BID AC    potassium chloride CR capsule 50 mEq Once    sevelamer carbonate tablet 1,600 mg TID WM    sodium chloride 0.9% flush 10 mL PRN     Family History     Problem Relation (Age of Onset)    Alcohol abuse Maternal Grandmother    Diabetes Brother, Maternal Grandfather    Early death Mother    Heart disease Father    Hyperlipidemia Father    Hypertension Father, Sister    Kidney disease Father        Tobacco Use    Smoking status: Former Smoker     Packs/day: 1.00     Years: 10.00      Pack years: 10.00    Smokeless tobacco: Never Used   Substance and Sexual Activity    Alcohol use: No    Drug use: No    Sexual activity: Yes     Partners: Female     Birth control/protection: None     Review of Systems   Constitutional: Positive for activity change, appetite change, fatigue and unexpected weight change. Negative for chills, diaphoresis and fever.   HENT: Negative.    Respiratory: Negative.    Cardiovascular: Negative.    Gastrointestinal: Positive for abdominal pain, diarrhea, nausea and vomiting. Negative for abdominal distention, anal bleeding, blood in stool, constipation and rectal pain.   Genitourinary: Positive for decreased urine volume. Negative for dysuria, flank pain and hematuria.   Skin: Negative.    Neurological: Negative.    Psychiatric/Behavioral: Negative.      Objective:     Vital Signs (Most Recent):  Temp: 97.8 °F (36.6 °C) (09/21/19 0735)  Pulse: 81 (09/21/19 0735)  Resp: 18 (09/21/19 0735)  BP: 103/60 (09/21/19 0735)  SpO2: 97 % (09/21/19 0735)  O2 Device (Oxygen Therapy): room air (09/20/19 1949) Vital Signs (24h Range):  Temp:  [97.4 °F (36.3 °C)-99.4 °F (37.4 °C)] 97.8 °F (36.6 °C)  Pulse:  [] 81  Resp:  [12-21] 18  SpO2:  [96 %-100 %] 97 %  BP: ()/(53-83) 103/60     Weight: 57.5 kg (126 lb 11.2 oz) (09/20/19 1630)  Body mass index is 20.45 kg/m².  Body surface area is 1.64 meters squared.    I/O last 3 completed shifts:  In: 720 [P.O.:720]  Out: -     Physical Exam   Constitutional: He is oriented to person, place, and time. He appears well-developed. He appears ill.   HENT:   Head: Normocephalic and atraumatic.   Eyes: Pupils are equal, round, and reactive to light. Conjunctivae and EOM are normal.   Neck: Normal range of motion. Neck supple.   Cardiovascular: Normal rate, regular rhythm, normal heart sounds and intact distal pulses.   Pulmonary/Chest: Effort normal and breath sounds normal.   Abdominal: He exhibits no distension and no mass. There is  tenderness. There is guarding. There is no rebound. No hernia.   Musculoskeletal: Normal range of motion. He exhibits no edema, tenderness or deformity.   Neurological: He is alert and oriented to person, place, and time.   Skin: Skin is warm and dry. No rash noted. No erythema. No pallor.   Psychiatric: He has a normal mood and affect. His behavior is normal.       Significant Labs:  CBC:   Recent Labs   Lab 09/21/19  0455   WBC 5.25   RBC 1.95*   HGB 6.3*   HCT 20.4*      *   MCH 32.3*   MCHC 30.9*     CMP:   Recent Labs   Lab 09/21/19  0455   GLU 72   CALCIUM 8.1*   ALBUMIN 2.1*   PROT 5.3*      K 2.9*   CO2 28      BUN 20   CREATININE 6.6*   ALKPHOS 175*   ALT 7*   AST 16   BILITOT 0.2     All labs within the past 24 hours have been reviewed.

## 2019-09-21 NOTE — ASSESSMENT & PLAN NOTE
Chronic blood loss anemia  Hg on admission 7.5, most recent trend show baseline around 8-9. Earlier, baseline appeared to be closer to 10-12. Concern for chronic GI loss vs worsening AoCD.   Check iron (consistent with AoCD), ferritin (elev), folate (wnl), B12 (elev)  Transfuse for Hg <7 g/dl. 2u PRBC ordered and given 9/21 for Hg 6.3 and suspected ongoing bleeding.  Typed and crossed, consented.   CBC q 12h    Judicious IVF prn for hemodynamics, caution due to ESRD and volume overload.   EPO per nephrology.

## 2019-09-21 NOTE — SUBJECTIVE & OBJECTIVE
Interval History: NAEON. Seen at bedside. Minimally interactive with covers pulled over head. Denied any complaints. No recurrent episodes of hematemesis. Endorsed hunger this morning, tolerated diet well. Hg dropped to 6.3 g/dl, 2U pRBC ordered to be transfused.     Review of Systems   Constitutional: Positive for activity change, appetite change and unexpected weight change. Negative for chills, diaphoresis, fatigue and fever.   Eyes: Negative for photophobia and visual disturbance.   Respiratory: Negative for cough, shortness of breath, wheezing and stridor.    Cardiovascular: Negative for chest pain, palpitations and leg swelling.   Gastrointestinal: Positive for abdominal pain (epigastric), diarrhea, nausea and vomiting. Negative for blood in stool and constipation.   Endocrine: Negative for cold intolerance and heat intolerance.   Genitourinary: Positive for difficulty urinating (oliguric).   Skin: Negative for rash and wound.   Allergic/Immunologic: Negative for immunocompromised state.   Neurological: Negative for dizziness, seizures, syncope, weakness, light-headedness and headaches.   Psychiatric/Behavioral: Negative for agitation, behavioral problems and confusion.     Objective:     Vital Signs (Most Recent):  Temp: 98.3 °F (36.8 °C) (09/21/19 1455)  Pulse: 88 (09/21/19 1456)  Resp: 18 (09/21/19 1455)  BP: (!) 102/55 (09/21/19 1456)  SpO2: (!) 94 % (09/21/19 1455) Vital Signs (24h Range):  Temp:  [97.4 °F (36.3 °C)-99.4 °F (37.4 °C)] 98.3 °F (36.8 °C)  Pulse:  [] 88  Resp:  [12-18] 18  SpO2:  [94 %-100 %] 94 %  BP: ()/(53-83) 102/55     Weight: 57.5 kg (126 lb 11.2 oz)  Body mass index is 20.45 kg/m².    Intake/Output Summary (Last 24 hours) at 9/21/2019 1535  Last data filed at 9/20/2019 2300  Gross per 24 hour   Intake 720 ml   Output --   Net 720 ml      Physical Exam   Constitutional: He is oriented to person, place, and time. He appears well-developed. He appears cachectic. He appears  ill. No distress.   HENT:   Head: Normocephalic and atraumatic.   Mouth/Throat: No oropharyngeal exudate.   Eyes: Pupils are equal, round, and reactive to light. Conjunctivae and EOM are normal. No scleral icterus.   Neck: Normal range of motion. Neck supple. No tracheal deviation present. No thyromegaly present.   Cardiovascular: Normal rate, regular rhythm, normal heart sounds and intact distal pulses.   No murmur heard.  Pulmonary/Chest: Effort normal and breath sounds normal. No respiratory distress. He has no wheezes. He has no rales. He exhibits no tenderness.   Abdominal: Soft. Bowel sounds are normal. He exhibits no distension. There is tenderness (epigastric, midline). There is guarding (voluntary). There is no rebound.   Musculoskeletal: Normal range of motion. He exhibits no edema or tenderness.   Lymphadenopathy:     He has no cervical adenopathy.   Neurological: He is alert and oriented to person, place, and time.   Skin: Skin is warm and dry. No rash noted. He is not diaphoretic. No erythema. No pallor.   Psychiatric: He has a normal mood and affect. Judgment and thought content normal. He is withdrawn.       MELD-Na score: 20 at 9/21/2019  4:55 AM  MELD score: 20 at 9/21/2019  4:55 AM  Calculated from:  Serum Creatinine: On dialysis. Using 4 mg/dL.  Serum Sodium: 139 mmol/L (Rounded to 137 mmol/L) at 9/21/2019  4:55 AM  Total Bilirubin: 0.2 mg/dL (Rounded to 1 mg/dL) at 9/21/2019  4:55 AM  INR(ratio): 1.0 at 9/21/2019  4:55 AM  Age: 54 years    Significant Labs:  CBC:  Recent Labs   Lab 09/20/19  0942 09/20/19  0953 09/21/19  0455   WBC 7.84  --  5.25   HGB 7.5*  --  6.3*   HCT 23.9* 22* 20.4*     --  218     CMP:  Recent Labs   Lab 09/20/19  0942 09/21/19  0455    139   K 3.3* 2.9*    102   CO2 32* 28   * 72   BUN 11 20   CREATININE 4.9* 6.6*   CALCIUM 8.2* 8.1*   PROT 6.1 5.3*   ALBUMIN 2.3* 2.1*   BILITOT 0.3 0.2   ALKPHOS 196* 175*   AST 21 16   ALT 10 7*   ANIONGAP 8 9    EGFRNONAA 12.3* 8.6*     PTINR:  Recent Labs   Lab 09/20/19  0942 09/21/19  0455   INR 1.1 1.0       Significant Procedures:   Dobutamine Stress Test with Color Flow: No results found. However, due to the size of the patient record, not all encounters were searched. Please check Results Review for a complete set of results.    x2 U RBC transfusion 9/21

## 2019-09-21 NOTE — CONSULTS
[facility]  Nephrology  Consult Note    Patient Name: Vaughn Retana  MRN: [id number]  Admission Date: 9/20/2019  Hospital Length of Stay: 1 days  Attending Provider: Rosemary Womack*   Primary Care Physician: Yvette Zelaya NP  Principal Problem:<principal problem not specified>    Inpatient consult to Nephrology  Consult performed by: Ray Ortiz NP  Consult ordered by: Rosemary Womack MD  Reason for consult: Management of ESRD and HD        Subjective:     HPI: Mr Retana is a 53yo male with a past medical history of ESRD on HD MWF who presented to the [facility] [place] [place] with c/o hematemesis. He was last dialyzed on Friday, but only received about an hour of treatment before being sent to the ED 2/2 episodes of hematemesis. Hgb on admission was 7.5. This morning, his Hgb has dropped to 6.3. He reports weight loss over the past 6 months, but is unsure how much. He also endorses decreased appetite, fatigue, epigastric pain, and N/V/D. He denies fever, CP, HA, dizziness, SOB, cough, and swelling to his lower extremities. Nephrology has been consulted for management of ESRD and HD while in-patient.     -HPI was obtained from the patient and the patient's chart.         Past Medical History:   Diagnosis Date    Amputation stump pain 9/10/2013    Aspiration pneumonia 7/27/2015    Asterixis 11/8/2016    C. difficile colitis 8/7/2015    Cholelithiasis without obstruction 8/25/2015    Chronic diastolic heart failure     2-23-17   1 - Low normal to mildly depressed left ventricular systolic function (EF 50-55%).    2 - Right ventricular enlargement with mildly depressed systolic function.    3 - Left ventricular diastolic dysfunction.    4 - Right atrial enlargement.    5 - Severe tricuspid regurgitation.    6 - Pulmonary hypertension. The estimated PA systolic pressure is 86 mmHg.    7 - Increased central venous pressure.    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 OR 1ST FLR    COLONOSCOPY      COLONOSCOPY N/A 4/4/2017    Performed by Walker Stern MD at [facility] (2ND FLR)    EGD (ESOPHAGOGASTRODUODENOSCOPY) N/A 3/7/2019    Performed by Man Galicia MD at [facility] ENDO (2ND FLR)    EGD (ESOPHAGOGASTRODUODENOSCOPY) N/A 6/12/2018    Performed by Man Galicia MD at [facility] END[facility] (2ND FLR)    ESOPHAGOGASTRODUODENOSCOPY (EGD) N/A 5/22/2018    Performed by Ke Sparks MD at [facility] (2ND FLR)    ESOPHAGOGASTRODUODENOSCOPY (EGD) N/A 3/16/2018    Performed by Kevin De La Paz MD at [facility] (2ND FLR)    ESOPHAGOGASTRODUODENOSCOPY (EGD) N/A 3/8/2018    Performed by Man Galicia MD at [facility] END[facility] (2ND FLR)    ESOPHAGOGASTRODUODENOSCOPY (EGD) N/A 4/4/2017    Performed by Walker Stern MD at [facility] END[facility] (2ND FLR)    ESOPHAGOGASTRODUODENOSCOPY (EGD) N/A 10/17/2014    Performed by Man Galicia MD at [facility] (2ND FLR)    FISTULOGRAM Right  9/18/2014    Performed [facility] Grayson Hubbard [facility]    FOOT AMPUTATION THROUGH METATARSAL      left foot    LEG AMPUTATION THROUGH KNEE  12/18/2013    left BKA    R AVF  9/12/12    UPPER GASTROINTESTINAL ENDOSCOPY         Review of patient's allergies indicates:   Allergen Reactions    Fosrenol [lanthanum] Nausea And Vomiting     Nausea and vomiting     Current Facility-Administered Medications   Medication Frequency    0.9%  NaCl infusion (for blood administration) Q24H PRN    0.9%  NaCl infusion Once    acetaminophen tablet 650 mg Q8H PRN    carvedilol tablet 3.125 mg BID WM    metoclopramide HCl tablet 10 mg TID AC    ondansetron disintegrating tablet 8 mg Q8H PRN    pantoprazole EC tablet 40 mg BID AC    potassium chloride CR capsule 50 mEq Once    sevelamer carbonate tablet 1,600 mg TID WM    sodium chloride 0.9% flush 10 mL PRN     Family History     Problem Relation (Age of Onset)    Alcohol abuse Maternal Grandmother    Diabetes Brother, Maternal Grandfather    Early death Mother    Heart disease Father    Hyperlipidemia Father    Hypertension Father, Sister    Kidney disease Father        Tobacco Use    Smoking status: Former Smoker     Packs/day: 1.00     Years: 10.00     Pack years: 10.00    Smokeless tobacco: Never Used   Substance and Sexual Activity    Alcohol use: No    Drug use: No    Sexual activity: Yes     Partners: Female     Birth control/protection: None     Review of Systems   Constitutional: Positive for activity change, appetite change, fatigue and unexpected weight change. Negative for chills, diaphoresis and fever.   HENT: Negative.    Respiratory: Negative.    Cardiovascular: Negative.    Gastrointestinal: Positive for abdominal pain, diarrhea, nausea and vomiting. Negative for abdominal distention, anal bleeding, blood in stool, constipation and rectal pain.   Genitourinary: Positive for decreased urine volume. Negative for dysuria, flank pain and  hematuria.   Skin: Negative.    Neurological: Negative.    Psychiatric/Behavioral: Negative.      Objective:     Vital Signs (Most Recent):  Temp: 97.8 °F (36.6 °C) (09/21/19 0735)  Pulse: 81 (09/21/19 0735)  Resp: 18 (09/21/19 0735)  BP: 103/60 (09/21/19 0735)  SpO2: 97 % (09/21/19 0735)  O2 Device (Oxygen Therapy): room air (09/20/19 1949) Vital Signs (24h Range):  Temp:  [97.4 °F (36.3 °C)-99.4 °F (37.4 °C)] 97.8 °F (36.6 °C)  Pulse:  [] 81  Resp:  [12-21] 18  SpO2:  [96 %-100 %] 97 %  BP: ()/(53-83) 103/60     Weight: 57.5 kg (126 lb 11.2 oz) (09/20/19 1630)  Body mass index is 20.45 kg/m².  Body surface area is 1.64 meters squared.    I/O last 3 completed shifts:  In: 720 [P.O.:720]  Out: -     Physical Exam   Constitutional: He is oriented to person, place, and time. He appears well-developed. He appears ill.   HENT:   Head: Normocephalic and atraumatic.   Eyes: Pupils are equal, round, and reactive to light. Conjunctivae and EOM are normal.   Neck: Normal range of motion. Neck supple.   Cardiovascular: Normal rate, regular rhythm, normal heart sounds and intact distal pulses.   Pulmonary/Chest: Effort normal and breath sounds normal.   Abdominal: He exhibits no distension and no mass. There is tenderness. There is guarding. There is no rebound. No hernia.   Musculoskeletal: Normal range of motion. He exhibits no edema, tenderness or deformity.   Neurological: He is alert and oriented to person, place, and time.   Skin: Skin is warm and dry. No rash noted. No erythema. No pallor.   Psychiatric: He has a normal mood and affect. His behavior is normal.       Significant Labs:  CBC:   Recent Labs   Lab 09/21/19  0455   WBC 5.25   RBC 1.95*   HGB 6.3*   HCT 20.4*      *   MCH 32.3*   MCHC 30.9*     CMP:   Recent Labs   Lab 09/21/19  0455   GLU 72   CALCIUM 8.1*   ALBUMIN 2.1*   PROT 5.3*      K 2.9*   CO2 28      BUN 20   CREATININE 6.6*   ALKPHOS 175*   ALT 7*   AST 16    BILITOT 0.2     All labs within the past 24 hours have been reviewed.      Assessment/Plan:     ESRD on hemodialysis  Outpatient HD Information:    -[place] [facility] [place] [facility]  -Nephrologist: Pt is unsure   -HD tx days: MWF  -HD tx time: Pt is unsure  -Last HD tx: Friday (only received 1hr of treatment)  -HD access: RUE AVG  -HD modality: iHD  -Residual urine: anuric   -EDW: Pt is unsure     Inpatient HD Plan:    -Will tentatively plan for HD s/p scope and transfusion if patient is hemodynamically stable. Would use BFR of 250, with conservative fluid removal.   -Will request outpatient records for review   -Renal diet  -Maintain Hgb >7   -Strict I/O's and daily weights  -Daily renal function panels   -Will continue to follow while inpatient         Thank you for your consult. I will follow-up with patient. Please contact us if you have any additional questions.    Ray Ortiz NP  Nephrology  [facility]

## 2019-09-21 NOTE — ASSESSMENT & PLAN NOTE
Outpatient HD Information:    -Outpatient HD unit[place] [facility] [place] [facility]  -Nephrologist: Pt is unsure   -HD tx days: MWF  -HD tx time: Pt is unsure  -Last HD tx: Friday (only received 1hr of treatment)  -HD access: RUE AVG  -HD modality: iHD  -Residual urine: anuric   -EDW: Pt is unsure     Inpatient HD Plan:    -Will tentatively plan for HD s/p scope and transfusion if patient is hemodynamically stable. Would use BFR of 250, with conservative fluid removal.   -Will request outpatient records for review   -Renal diet  -Maintain Hgb >7   -Strict I/O's and daily weights  -Daily renal function panels   -Will continue to follow while inpatient

## 2019-09-21 NOTE — HPI
Mr Retana is a 55yo male with a past medical history of ESRD on HD MWF who [place] [facility] [place]/o [place]em[place]. He was last dialyzed on Friday, but only received about an hour of treatment before being sent to the ED 2/2 episodes of hematemesis. Hgb on admission was 7.5. This morning, his Hgb has dropped to 6.3. He reports weight loss over the past 6 months, but is unsure how much. He also endorses decreased appetite, fatigue, epigastric pain, and N/V/D. He denies fever, CP, HA, dizziness, SOB, cough, and swelling to his lower extremities. Nephrology has been consulted for management of ESRD and HD while in-patient.     -HPI was obtained from the patient and the patient's chart.

## 2019-09-21 NOTE — ASSESSMENT & PLAN NOTE
Chronic blood loss  Suspect this is due to chronic blood loss, anemia of chronic disease and possibly superimposed acute GIB loss due to gastritis vs PUD vs esophagitis, vs MWT (in setting of vomiting). No prior history of cancer or esophageal varices on multiple prior EGDs.   Continue PPI, judicious fluids (caution due to ESRD).   Patient is currently hemodynamically stable. Hg 7.5 initially no indication for emergent transfusion. However, will type and cross, consent. Transfuse for Hg <7g/dl. Check Hg/HCT q12h. Maintain 2 large bore IVs.   GI consulted; plan for EGD Monday morning. NPO tomorrow at midnight.   2u PRBC transfused 9/21 for Hg 6.3. Monitor respiratory status.

## 2019-09-22 NOTE — PLAN OF CARE
Patient aao x 3. Remains free of falls. Denies any pain or discomfort. Telemetry d/c due to patient noncompliance. Bed in lowest position, side rails up x 2, call light in reach. Personal belongings at the bedside. Will continue to monitor.

## 2019-09-22 NOTE — PROGRESS NOTES
"[facility] Medicine  Progress Note    Patient Name: Vaughn Retana  MRN: [id number]  Patient Class: IP- Inpatient   Admission Date: 9/20/2019  Length of Stay: 2 days  Attending Physician: Rosemary Womack*  Primary Care Provider: Yvette Zelaya NP    [facility] Medicine Team: [facility] HOSP MED A Rosemary Womack MD    Subjective:     Principal Problem:<principal problem not specified>        HPI:  Mr. Retana is a 55 yo AAM with ESRD on dialysis MWF, L below knee amputation 2/2 osteomyelitis, dCHF, GERD, h/o multiple ICH w/o residual deficits, who presented to [facility] ED with primary complaint of hematemesis. Patient is a poor historian and has limited knowledge of his medical history. He stated that overnight he vomited 5 times. The vomitus was a mixture of gastric contents and dark brown blood. On morning of admission, during dialysis, he reported vomiting dark brown blood 2 more times and was advised by dialysis staff to report to ED. His HD session was terminated early. Associated symptoms included epigastric abdominal pain (resolved on evaluation) and diarrhea. Last normal stool was 2 days ago. Patient reported epigastric abdominal pain at baseline which was worse with eating. Denied hematochezia, melena, fever, chills, SOB, headaches, dizziness, visual changes, hearing changes, headaches.      Patient reported weight loss of "one hundred and something pounds" over the past 6 months, which he attributed to decreased appetite, abdominal pain with PO intake and recurrent vomiting. However, on chart review weight was recorded at 63.9kg/140lbs at ED visit 03/07/2019 - today's weight is 71.2kgs/157lbs. Unclear of dry weight.      Patient unsure which medications he takes and reported his niece manages his daily meds. He currently lives in a motel. Was previously living with his sister until one month ago. Stated he moved out for more privacy. He is a former smoker, 2 " packs-per-day for approximately 40 years. States he quit completely one year ago. Currently uses chewing tobacco daily. Denies vaping. Denies ETOH use. Denies use of illicit drugs.     The patient was discharged from the hospital on 9/6/19 for similar symptoms of abdominal pain with intractable nausea and vomiting and was evaluated by GI at that time as well. However, a repeat EGD was not done due to the patient being seen a [facility] earlier in August and having had an EGD done on 8/2 showing grade A esophagitis. A NM gastric emptying study was done during the last admission that was suggestive of gastroparesis retaining 50% of his gastric contents 4 hours after meals. He was placed on a PPI 40 mg BID, Reglan TID before meals, and Carafate BID and reports being compliant with his medications. The following is a break down of his GI procedure history:     8/22/19 - EGD [facility] - LA grade A reflux esophagitis with no active bleeding     3/7/2019 - EGD [facility] - LA Grade D reflux esophagitis involving the entire esophagus  with a 2 cm sliding hiatal hernia. Congestive gastropathy, erythematous doudenopathy. No specimens collected     5/22/2018 - EGD [facility] - LA grade D reflux esophagitis with small hiatal hernia. Normal stomach and duodenum. No specimens collected     3/16/2018 - EGD [facility] - LA grade C reflux esophagitis with non-bleeding gastric ulcer with a clean base and a 4 cm hiatal hernia. 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 Normal duodenum     3/8/2018 - EGD [facility] - LA Grade B reflux esophagitis with a 3 cm hiatal hernia. Gastric polyp no biopsies. Normal duodenum     4/4/2017 - EGD [facility] -  LA Grade D reflux esophagitis with a small hiatal hernia. Normal stomach and duodenum     4/4/2017 - Colonoscopy [facility] - 3 mm polyp removed from the transverse colon pathology adenomatous. 8 mm polyp found in the sigmoid colon removed Pathology adenovillous. Suggestion to  "repeat in 3 years    Patient gave niece's, "Ector", phone number and permission to contact her about his medical history and care: [phone].    Overview/Hospital Course:  No notes on file    Interval History: NAEON. Seen at bedside. Minimally interactive with covers pulled over head. Denied any complaints. "I want to go home". No recurrent episodes of hematemesis. Lunch tray at bedside untouched. Hg dropped to 6.3 g/dl yesterday, 2U pRBC ordered to be transfused, responded appropriately.     Review of Systems   Constitutional: Positive for activity change, appetite change and unexpected weight change. Negative for chills, diaphoresis, fatigue and fever.   Eyes: Negative for photophobia and visual disturbance.   Respiratory: Negative for cough, shortness of breath, wheezing and stridor.    Cardiovascular: Negative for chest pain, palpitations and leg swelling.   Gastrointestinal: Positive for abdominal pain (epigastric), diarrhea, nausea and vomiting. Negative for blood in stool and constipation.   Endocrine: Negative for cold intolerance and heat intolerance.   Genitourinary: Positive for difficulty urinating (oliguric).   Skin: Negative for rash and wound.   Allergic/Immunologic: Negative for immunocompromised state.   Neurological: Negative for dizziness, seizures, syncope, weakness, light-headedness and headaches.   Psychiatric/Behavioral: Negative for agitation, behavioral problems and confusion.     Objective:     Vital Signs (Most Recent):  Temp: 97.8 °F (36.6 °C) (09/22/19 1132)  Pulse: 83 (09/22/19 1132)  Resp: 18 (09/22/19 0710)  BP: 139/83 (09/22/19 1132)  SpO2: 100 % (09/22/19 1132) Vital Signs (24h Range):  Temp:  [96.3 °F (35.7 °C)-98.6 °F (37 °C)] 97.8 °F (36.6 °C)  Pulse:  [78-93] 83  Resp:  [16-20] 18  SpO2:  [94 %-100 %] 100 %  BP: ()/(53-83) 139/83     Weight: 57.5 kg (126 lb 11.2 oz)  Body mass index is 20.45 kg/m².    Intake/Output Summary (Last 24 hours) at 9/22/2019 1253  Last data " filed at 9/22/2019 0600  Gross per 24 hour   Intake 840 ml   Output --   Net 840 ml      Physical Exam   Constitutional: He is oriented to person, place, and time. He appears well-developed. He appears cachectic. He appears ill. No distress.   HENT:   Head: Normocephalic and atraumatic.   Mouth/Throat: No oropharyngeal exudate.   Eyes: Pupils are equal, round, and reactive to light. Conjunctivae and EOM are normal. No scleral icterus.   Neck: Normal range of motion. Neck supple. No tracheal deviation present. No thyromegaly present.   Cardiovascular: Normal rate, regular rhythm, normal heart sounds and intact distal pulses.   No murmur heard.  Pulmonary/Chest: Effort normal and breath sounds normal. No respiratory distress. He has no wheezes. He has no rales. He exhibits no tenderness.   Abdominal: Soft. Bowel sounds are normal. He exhibits no distension. There is tenderness (epigastric, midline). There is guarding (voluntary). There is no rebound.   Musculoskeletal: Normal range of motion. He exhibits no edema or tenderness.   Lymphadenopathy:     He has no cervical adenopathy.   Neurological: He is alert and oriented to person, place, and time.   Skin: Skin is warm and dry. No rash noted. He is not diaphoretic. No erythema. No pallor.   Psychiatric: He has a normal mood and affect. Judgment and thought content normal. He is withdrawn.       MELD-Na score: 20 at 9/22/2019  8:44 AM  MELD score: 20 at 9/22/2019  8:44 AM  Calculated from:  Serum Creatinine: On dialysis. Using 4 mg/dL.  Serum Sodium: 140 mmol/L (Rounded to 137 mmol/L) at 9/22/2019  8:44 AM  Total Bilirubin: 0.2 mg/dL (Rounded to 1 mg/dL) at 9/21/2019  4:55 AM  INR(ratio): 1.0 at 9/21/2019  4:55 AM  Age: 54 years    Significant Labs:  CBC:  Recent Labs   Lab 09/21/19  0455 09/22/19  0123 09/22/19  0433   WBC 5.25 6.94 6.58   HGB 6.3* 9.4* 9.8*   HCT 20.4* 27.9* 28.7*    234 152     CMP:  Recent Labs   Lab 09/21/19  0455 09/22/19  0844     140   K 2.9* 4.3    104   CO2 28 25   GLU 72 93   BUN 20 30*   CREATININE 6.6* 8.3*   CALCIUM 8.1* 8.8   PROT 5.3*  --    ALBUMIN 2.1*  --    BILITOT 0.2  --    ALKPHOS 175*  --    AST 16  --    ALT 7*  --    ANIONGAP 9 11   EGFRNONAA 8.6* 6.5*     PTINR:  Recent Labs   Lab 09/21/19  0455   INR 1.0       Significant Procedures:   Dobutamine Stress Test with Color Flow: No results found. However, due to the size of the patient record, not all encounters were searched. Please check Results Review for a complete set of results.    x2 U RBC transfusion 9/21      Assessment/Plan:      Gastroparesis  Diagnosed previously; continue reglan TID.     Gastrointestinal hemorrhage with hematemesis  Chronic blood loss  Suspect this is due to chronic blood loss, anemia of chronic disease and possibly superimposed acute GIB loss due to gastritis vs PUD vs esophagitis, vs MWT (in setting of vomiting). No prior history of cancer or esophageal varices on multiple prior EGDs.   Continue PPI, judicious fluids (caution due to ESRD).   Patient is currently hemodynamically stable. Hg 7.5 initially no indication for emergent transfusion. However, will type and cross, consent. Transfuse for Hg <7g/dl. Check Hg/HCT q12h. Maintain 2 large bore IVs.   GI consulted; plan for EGD Monday morning. NPO sunday at midnight.   2u PRBC transfused 9/21 for Hg 6.3. Monitor respiratory status.    GERD with esophagitis  Continue PPI BID  H/o recurrent EGDs as noted above in HPI.   GI consulted; f/u recs. Tentative EGD scheduled for tomorrow.     Chronic kidney disease-mineral and bone disorder  Continue renvela    History of Intracerebral Hemorrhage: L BG 5/2013; R BG 9/2016; R BG 11/2016; L caudate head 2/2017  Chronic, stable.   Continue to monitor.   Not currently on DAPT, or AC given h/o ICH/GIB.   PT/OT.    ESRD on hemodialysis  Consult nephrology for resumption of HD.   Dialyzes M/W/F. Did not complete session this morning prior to arrival.   No  current indication for emergent HD; patient is not overloaded, oxygenating adequately on RA, lytes are acceptable.     Anemia in chronic kidney disease  Chronic blood loss anemia  Hg on admission 7.5, most recent trend show baseline around 8-9. Earlier, baseline appeared to be closer to 10-12. Concern for chronic GI loss vs worsening AoCD.   Check iron (consistent with AoCD), ferritin (elev), folate (wnl), B12 (elev)  Transfuse for Hg <7 g/dl. 2u PRBC ordered and given 9/21 for Hg 6.3 and suspected ongoing bleeding. Responded appropriately to 9.8 g/dl today.  Typed and crossed, consented.   CBC q 12h -->q24h  Judicious IVF prn for hemodynamics, caution due to ESRD and volume overload.   EPO per nephrology.       VTE Risk Mitigation (From admission, onward)         Ordered     Reason for No Pharmacological VTE Prophylaxis  Once      09/20/19 1509     IP VTE HIGH RISK PATIENT  Once      09/20/19 1509                      Rosemary Womack MD  Department of Hospital Medicine   [facility]

## 2019-09-22 NOTE — ASSESSMENT & PLAN NOTE
Chronic blood loss  Suspect this is due to chronic blood loss, anemia of chronic disease and possibly superimposed acute GIB loss due to gastritis vs PUD vs esophagitis, vs MWT (in setting of vomiting). No prior history of cancer or esophageal varices on multiple prior EGDs.   Continue PPI, judicious fluids (caution due to ESRD).   Patient is currently hemodynamically stable. Hg 7.5 initially no indication for emergent transfusion. However, will type and cross, consent. Transfuse for Hg <7g/dl. Check Hg/HCT q12h. Maintain 2 large bore IVs.   GI consulted; plan for EGD Monday morning. NPO sunday at midnight.   2u PRBC transfused 9/21 for Hg 6.3. Monitor respiratory status.

## 2019-09-22 NOTE — ASSESSMENT & PLAN NOTE
Chronic blood loss anemia  Hg on admission 7.5, most recent trend show baseline around 8-9. Earlier, baseline appeared to be closer to 10-12. Concern for chronic GI loss vs worsening AoCD.   Check iron (consistent with AoCD), ferritin (elev), folate (wnl), B12 (elev)  Transfuse for Hg <7 g/dl. 2u PRBC ordered and given 9/21 for Hg 6.3 and suspected ongoing bleeding. Responded appropriately to 9.8 g/dl today.  Typed and crossed, consented.   CBC q 12h -->q24h  Judicious IVF prn for hemodynamics, caution due to ESRD and volume overload.   EPO per nephrology.

## 2019-09-22 NOTE — SUBJECTIVE & OBJECTIVE
"Interval History: NAEON. Seen at bedside. Minimally interactive with covers pulled over head. Denied any complaints. "I want to go home". No recurrent episodes of hematemesis. Lunch tray at bedside untouched. Hg dropped to 6.3 g/dl yesterday, 2U pRBC ordered to be transfused, responded appropriately.     Review of Systems   Constitutional: Positive for activity change, appetite change and unexpected weight change. Negative for chills, diaphoresis, fatigue and fever.   Eyes: Negative for photophobia and visual disturbance.   Respiratory: Negative for cough, shortness of breath, wheezing and stridor.    Cardiovascular: Negative for chest pain, palpitations and leg swelling.   Gastrointestinal: Positive for abdominal pain (epigastric), diarrhea, nausea and vomiting. Negative for blood in stool and constipation.   Endocrine: Negative for cold intolerance and heat intolerance.   Genitourinary: Positive for difficulty urinating (oliguric).   Skin: Negative for rash and wound.   Allergic/Immunologic: Negative for immunocompromised state.   Neurological: Negative for dizziness, seizures, syncope, weakness, light-headedness and headaches.   Psychiatric/Behavioral: Negative for agitation, behavioral problems and confusion.     Objective:     Vital Signs (Most Recent):  Temp: 97.8 °F (36.6 °C) (09/22/19 1132)  Pulse: 83 (09/22/19 1132)  Resp: 18 (09/22/19 0710)  BP: 139/83 (09/22/19 1132)  SpO2: 100 % (09/22/19 1132) Vital Signs (24h Range):  Temp:  [96.3 °F (35.7 °C)-98.6 °F (37 °C)] 97.8 °F (36.6 °C)  Pulse:  [78-93] 83  Resp:  [16-20] 18  SpO2:  [94 %-100 %] 100 %  BP: ()/(53-83) 139/83     Weight: 57.5 kg (126 lb 11.2 oz)  Body mass index is 20.45 kg/m².    Intake/Output Summary (Last 24 hours) at 9/22/2019 1253  Last data filed at 9/22/2019 0600  Gross per 24 hour   Intake 840 ml   Output --   Net 840 ml      Physical Exam   Constitutional: He is oriented to person, place, and time. He appears well-developed. He " appears cachectic. He appears ill. No distress.   HENT:   Head: Normocephalic and atraumatic.   Mouth/Throat: No oropharyngeal exudate.   Eyes: Pupils are equal, round, and reactive to light. Conjunctivae and EOM are normal. No scleral icterus.   Neck: Normal range of motion. Neck supple. No tracheal deviation present. No thyromegaly present.   Cardiovascular: Normal rate, regular rhythm, normal heart sounds and intact distal pulses.   No murmur heard.  Pulmonary/Chest: Effort normal and breath sounds normal. No respiratory distress. He has no wheezes. He has no rales. He exhibits no tenderness.   Abdominal: Soft. Bowel sounds are normal. He exhibits no distension. There is tenderness (epigastric, midline). There is guarding (voluntary). There is no rebound.   Musculoskeletal: Normal range of motion. He exhibits no edema or tenderness.   Lymphadenopathy:     He has no cervical adenopathy.   Neurological: He is alert and oriented to person, place, and time.   Skin: Skin is warm and dry. No rash noted. He is not diaphoretic. No erythema. No pallor.   Psychiatric: He has a normal mood and affect. Judgment and thought content normal. He is withdrawn.       MELD-Na score: 20 at 9/22/2019  8:44 AM  MELD score: 20 at 9/22/2019  8:44 AM  Calculated from:  Serum Creatinine: On dialysis. Using 4 mg/dL.  Serum Sodium: 140 mmol/L (Rounded to 137 mmol/L) at 9/22/2019  8:44 AM  Total Bilirubin: 0.2 mg/dL (Rounded to 1 mg/dL) at 9/21/2019  4:55 AM  INR(ratio): 1.0 at 9/21/2019  4:55 AM  Age: 54 years    Significant Labs:  CBC:  Recent Labs   Lab 09/21/19  0455 09/22/19  0123 09/22/19  0433   WBC 5.25 6.94 6.58   HGB 6.3* 9.4* 9.8*   HCT 20.4* 27.9* 28.7*    234 152     CMP:  Recent Labs   Lab 09/21/19  0455 09/22/19  0844    140   K 2.9* 4.3    104   CO2 28 25   GLU 72 93   BUN 20 30*   CREATININE 6.6* 8.3*   CALCIUM 8.1* 8.8   PROT 5.3*  --    ALBUMIN 2.1*  --    BILITOT 0.2  --    ALKPHOS 175*  --    AST 16   --    ALT 7*  --    ANIONGAP 9 11   EGFRNONAA 8.6* 6.5*     PTINR:  Recent Labs   Lab 09/21/19  0455   INR 1.0       Significant Procedures:   Dobutamine Stress Test with Color Flow: No results found. However, due to the size of the patient record, not all encounters were searched. Please check Results Review for a complete set of results.    x2 U RBC transfusion 9/21

## 2019-09-22 NOTE — PLAN OF CARE
POC reviewed with patient who verbalized understanding. Questions and concerns addressed. AAOx4. VSS. One unit of blood administered over night. Remains free from falls and injury. Addresses ongoing goals. Safety precautions maintained, call light within reach. No acute events. No distress noted. See flowsheets for full assessment. Will continue to monitor.

## 2019-09-22 NOTE — NURSING
"Patient refusing to keep telemetry leads on. States "I am breathing, I don't need that on me". Dr. Womack contacted for d/c order on 9/21/19.   "

## 2019-09-23 NOTE — PLAN OF CARE
09/23/19 1030   Discharge Assessment   Assessment Type Discharge Planning Assessment   Confirmed/corrected address and phone number on facesheet? Yes   Assessment information obtained from? Patient   Expected Length of Stay (days) 3   Communicated expected length of stay with patient/caregiver yes   Prior to hospitilization cognitive status: Alert/Oriented   Prior to hospitalization functional status: Assistive Equipment   Current cognitive status: Alert/Oriented   Current Functional Status: Assistive Equipment   Lives With alone   Able to Return to Prior Arrangements yes   Is patient able to care for self after discharge? Yes   Patient's perception of discharge disposition home or selfcare   Readmission Within the Last 30 Days no previous admission in last 30 days   Patient currently being followed by outpatient case management? No   Patient currently receives any other outside agency services? No   Equipment Currently Used at Home prosthesis;rollator   Do you have any problems affording any of your prescribed medications? No   Is the patient taking medications as prescribed? yes   Does the patient have transportation home? Yes   Transportation Anticipated family or friend will provide   Dialysis Name and Scheduled days [facility]   Discharge Plan A Home   Discharge Plan B Home Health   DME Needed Upon Discharge  rollator   Patient/Family in Agreement with Plan yes   Patient states that he needs a new rollator. Reports that his was lost in hospital years ago and his niece bought his current rollator that is not functioning properly. Informed him that new one can be ordered but insurance won't cover unless it has been >5 years since last rollator ordered.He states that his niece can pick him up today but she will need to be called as battery is dead in his phone.  Established PCP for patient.

## 2019-09-23 NOTE — HOSPITAL COURSE
Patient was admitted to [facility] for further management. He remained hemodynamically stable. Hg dropped initially and required 2u pRBC transfusion. He responded appropriately and blood counts remained subsequently stable. He had no further episodes of hematemesis and advanced and tolerated a diet. He underwent endoscopy on 9/23 which showed some evidence of erosive esophagitis but no active or ongoing bleeding. He was subsequently successfully diayzed. He reported feeling at baseline and requested discharge home. Transportation was arranged and patient was discharged in stable condition 9/23 with instructions to follow up with his PCP in 1-2 weeks and resume his normal HD M/W/F at his next scheduled session (Wednesday).     For details on mgmt of IP problems, see below:   Gastroparesis  Diagnosed previously; continue reglan TID.   Tolerated diet well.      Gastrointestinal hemorrhage with hematemesis  Chronic blood loss  Suspect this is due to chronic blood loss, anemia of chronic disease and possibly superimposed acute GIB loss due to gastritis vs PUD vs esophagitis, vs MWT (in setting of vomiting). No prior history of cancer or esophageal varices on multiple prior EGDs.   Continue PPI, judicious fluids (caution due to ESRD).   Patient is currently hemodynamically stable. Hg 7.5 initially no indication for emergent transfusion. However, will type and cross, consent. Transfuse for Hg <7g/dl. Check Hg/HCT q12h. Maintain 2 large bore IVs.   GI consulted; plan for EGD Monday morning. NPO sunday at midnight.   2u PRBC transfused 9/21 for Hg 6.3. Monitor respiratory status.  Remained stable, no further transfusion needed. EGD 9/23 showed signs of esophagitis, small hiatal hernia, no active or stigmata of bleeding. Recommend f/u with PCP for any further issues. Will need rpt EGD in 8 weeks; referral placed.      GERD with esophagitis  Continue PPI BID  H/o recurrent EGDs as noted above in HPI.   GI consulted; f/u recs.  S/p EGD 9/23; no active bleeding identified. Esophagitis again noted. Continue PPI BID, rpt EGD rec'd in 8 weeks.      Chronic kidney disease-mineral and bone disorder  Continue renvela     History of Intracerebral Hemorrhage: L BG 5/2013; R BG 9/2016; R BG 11/2016; L caudate head 2/2017  Chronic, stable.   Continue to monitor.   Not currently on DAPT, or AC given h/o ICH/GIB.   PT/OT.     ESRD on hemodialysis  Consult nephrology for resumption of HD.   Dialyzes M/W/F. Did not complete session this morning prior to arrival.   No current indication for emergent HD; patient is not overloaded, oxygenating adequately on RA, lytes are acceptable.   Dialyzed 9/23; resume regular HD sessions upon discharge.      Anemia in chronic kidney disease  Chronic blood loss anemia  Hg on admission 7.5, most recent trend show baseline around 8-9. Earlier, baseline appeared to be closer to 10-12. Concern for chronic GI loss vs worsening AoCD.   Check iron (consistent with AoCD), ferritin (elev), folate (wnl), B12 (elev)  Transfuse for Hg <7 g/dl. 2u PRBC ordered and given 9/21 for Hg 6.3 and suspected ongoing bleeding. Responded appropriately to 9.8 g/dl today.  Typed and crossed, consented.   CBC q 12h -->q24h  Judicious IVF prn for hemodynamics, caution due to ESRD and volume overload.   EPO per nephrology.

## 2019-09-23 NOTE — [employer] PATIENT INSTRUCTIONS
Discharge Summary/Instructions after an Endoscopic Procedure  Patient Name: Vaughn Retana  Patient MRN: [id number]  Patient YOB: 1965 Monday, September 23, 2019  Keanu Rainey MD  RESTRICTIONS:  During your procedure today, you received medications for sedation.  These   medications may affect your judgment, balance and coordination.  Therefore,   for 24 hours, you have the following restrictions:   - DO NOT drive a car, operate machinery, make legal/financial decisions,   sign important papers or drink alcohol.    ACTIVITY:  Today: no heavy lifting, straining or running due to procedural   sedation/anesthesia.  The following day: return to full activity including work.  DIET:  Eat and drink normally unless instructed otherwise.     TREATMENT FOR COMMON SIDE EFFECTS:  - Mild abdominal pain, nausea, belching, bloating or excessive gas:  rest,   eat lightly and use a heating pad.  - Sore Throat: treat with throat lozenges and/or gargle with warm salt   water.  - Because air was used during the procedure, expelling large amounts of air   from your rectum or belching is normal.  - If a bowel prep was taken, you may not have a bowel movement for 1-3 days.    This is normal.  SYMPTOMS TO WATCH FOR AND REPORT TO YOUR PHYSICIAN:  1. Abdominal pain or bloating, other than gas cramps.  2. Chest pain.  3. Back pain.  4. Signs of infection such as: chills or fever occurring within 24 hours   after the procedure.  5. Rectal bleeding, which would show as bright red, maroon, or black stools.   (A tablespoon of blood from the rectum is not serious, especially if   hemorrhoids are present.)  6. Vomiting.  7. Weakness or dizziness.  GO DIRECTLY TO THE NEAREST EMERGENCY ROOM IF YOU HAVE ANY OF THE FOLLOWING:      Difficulty breathing              Chills and/or fever over 101 F   Persistent vomiting and/or vomiting blood   Severe abdominal pain   Severe chest pain   Black, tarry stools   Bleeding- more than one  tablespoon   Any other symptom or condition that you feel may need urgent attention  Your doctor recommends these additional instructions:  If any biopsies were taken, your doctors clinic will contact you in 1 to 2   weeks with any results.  - Continue present medications.   - Return patient to [facility] for [facility] care.   - Use a proton pump inhibitor PO BID.   - Follow an antireflux regimen.   - Repeat upper endoscopy in 12 weeks to check healing.   For questions, problems or results please call your physician - Keanu Rainey MD at Work:  [phone].  [facility], EMERGENCY ROOM PHONE NUMBER: [phone]  IF A COMPLICATION OR EMERGENCY SITUATION ARISES AND YOU ARE UNABLE TO REACH   YOUR PHYSICIAN - GO DIRECTLY TO THE EMERGENCY ROOM.  Keanu Rainey MD  9/23/2019 12:03:34 PM  This report has been verified and signed electronically.  [employer]

## 2019-09-23 NOTE — INTERVAL H&P NOTE
The patient has been examined and the H&P has been reviewed:    I concur with the findings and changes have been noted since the H&P was written: H&H stable. no further episode of coffee-grind emesis. for EGD evaluation today.    Anesthesia/Surgery risks, benefits and alternative options discussed and understood by patient/family.          Active Hospital Problems    Diagnosis  POA    Gastroparesis [K31.84]  Yes    Gastrointestinal hemorrhage with hematemesis [K92.0]  Yes    Chronic blood loss anemia [D50.0]  Yes    GERD with esophagitis [K21.0]  Yes    Chronic kidney disease-mineral and bone disorder [N18.9, E83.9, M89.9]  Yes     Chronic    History of Intracerebral Hemorrhage: L BG 5/2013; R BG 9/2016; R BG 11/2016; L caudate head 2/2017 [Z86.79]  Not Applicable     Chronic    ESRD on hemodialysis [N18.6, Z99.2]  Not Applicable     Chronic     MWF at [facility]      Anemia in chronic kidney disease [N18.9, D63.1]  Yes     Chronic      Resolved Hospital Problems   No resolved problems to display.

## 2019-09-23 NOTE — DISCHARGE SUMMARY
"[facility] Hospital Medicine  Discharge Summary      Patient Name: Vaughn Retana  MRN: [id number]  Admission Date: 9/20/2019  Hospital Length of Stay: 3 days  Discharge Date and Time: No discharge date for patient encounter.  Attending Physician: Rosemary Womack*   Discharging Provider: Rosemary Womack MD  Primary Care Provider: Yvette Zelaya NP  Hospital Medicine Team: [facility] HOSP MED A Rosemary Womack MD    HPI:   Mr. Retana is a 55 yo AAM with ESRD on dialysis MWF, L below knee amputation 2/2 osteomyelitis, dCHF, GERD, h/o multiple ICH w/o residual deficits, who presented to [facility] ED with primary complaint of hematemesis. Patient is a poor historian and has limited knowledge of his medical history. He stated that overnight he vomited 5 times. The vomitus was a mixture of gastric contents and dark brown blood. On morning of admission, during dialysis, he reported vomiting dark brown blood 2 more times and was advised by dialysis staff to report to ED. His HD session was terminated early. Associated symptoms included epigastric abdominal pain (resolved on evaluation) and diarrhea. Last normal stool was 2 days ago. Patient reported epigastric abdominal pain at baseline which was worse with eating. Denied hematochezia, melena, fever, chills, SOB, headaches, dizziness, visual changes, hearing changes, headaches.      Patient reported weight loss of "one hundred and something pounds" over the past 6 months, which he attributed to decreased appetite, abdominal pain with PO intake and recurrent vomiting. However, on chart review weight was recorded at 63.9kg/140lbs at ED visit 03/07/2019 - today's weight is 71.2kgs/157lbs. Unclear of dry weight.      Patient unsure which medications he takes and reported his niece manages his daily meds. He currently lives in a motel. Was previously living with his sister until one month ago. Stated he moved out for more privacy. He is a " former smoker, 2 packs-per-day for approximately 40 years. States he quit completely one year ago. Currently uses chewing tobacco daily. Denies vaping. Denies ETOH use. Denies use of illicit drugs.     The patient was discharged from the hospital on 9/6/19 for similar symptoms of abdominal pain with intractable nausea and vomiting and was evaluated by GI at that time as well. However, a repeat EGD was not done due to the patient being seen a [facility] earlier in August and having had an EGD done on 8/2 showing grade A esophagitis. A NM gastric emptying study was done during the last admission that was suggestive of gastroparesis retaining 50% of his gastric contents 4 hours after meals. He was placed on a PPI 40 mg BID, Reglan TID before meals, and Carafate BID and reports being compliant with his medications. The following is a break down of his GI procedure history:     8/22/19 - EGD [facility] - LA grade A reflux esophagitis with no active bleeding     3/7/2019 - EGD [facility] - LA Grade D reflux esophagitis involving the entire esophagus  with a 2 cm sliding hiatal hernia. Congestive gastropathy, erythematous doudenopathy. No specimens collected     5/22/2018 - EGD [facility] - LA grade D reflux esophagitis with small hiatal hernia. Normal stomach and duodenum. No specimens collected     3/16/2018 - EGD [facility] - LA grade C reflux esophagitis with non-bleeding gastric ulcer with a clean base and a 4 cm hiatal hernia. 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 Normal duodenum     3/8/2018 - EGD [facility] - LA Grade B reflux esophagitis with a 3 cm hiatal hernia. Gastric polyp no biopsies. Normal duodenum     4/4/2017 - EGD [facility] -  LA Grade D reflux esophagitis with a small hiatal hernia. Normal stomach and duodenum     4/4/2017 - Colonoscopy [facility] - 3 mm polyp removed from the transverse colon pathology adenomatous. 8 mm polyp found in the sigmoid colon removed Pathology adenovillous.  "Suggestion to repeat in 3 years    Patient gave niece's, "Ector", phone number and permission to contact her about his medical history and care: [phone].    Procedure(s) (LRB):  EGD (ESOPHAGOGASTRODUODENOSCOPY) (N/A)      Hospital Course:   Patient was admitted to [facility] for further management. He remained hemodynamically stable. Hg dropped initially and required 2u pRBC transfusion. He responded appropriately and blood counts remained subsequently stable. He had no further episodes of hematemesis and advanced and tolerated a diet. He underwent endoscopy on 9/23 which showed some evidence of erosive esophagitis but no active or ongoing bleeding. He was subsequently successfully diayzed. He reported feeling at baseline and requested discharge home. Transportation was arranged and patient was discharged in stable condition 9/23 with instructions to follow up with his PCP in 1-2 weeks and resume his normal HD M/W/F at his next scheduled session (Wednesday).     For details on mgmt of IP problems, see below:   Gastroparesis  Diagnosed previously; continue reglan TID.   Tolerated diet well.      Gastrointestinal hemorrhage with hematemesis  Chronic blood loss  Suspect this is due to chronic blood loss, anemia of chronic disease and possibly superimposed acute GIB loss due to gastritis vs PUD vs esophagitis, vs MWT (in setting of vomiting). No prior history of cancer or esophageal varices on multiple prior EGDs.   Continue PPI, judicious fluids (caution due to ESRD).   Patient is currently hemodynamically stable. Hg 7.5 initially no indication for emergent transfusion. However, will type and cross, consent. Transfuse for Hg <7g/dl. Check Hg/HCT q12h. Maintain 2 large bore IVs.   GI consulted; plan for EGD Monday morning. NPO sunday at midnight.   2u PRBC transfused 9/21 for Hg 6.3. Monitor respiratory status.  Remained stable, no further transfusion needed. EGD 9/23 showed signs of esophagitis, small hiatal " hernia, no active or stigmata of bleeding. Recommend f/u with PCP for any further issues. Will need rpt EGD in 8 weeks; referral placed.      GERD with esophagitis  Continue PPI BID  H/o recurrent EGDs as noted above in HPI.   GI consulted; f/u recs. S/p EGD 9/23; no active bleeding identified. Esophagitis again noted. Continue PPI BID, rpt EGD rec'd in 8 weeks.      Chronic kidney disease-mineral and bone disorder  Continue renvela     History of Intracerebral Hemorrhage: L BG 5/2013; R BG 9/2016; R BG 11/2016; L caudate head 2/2017  Chronic, stable.   Continue to monitor.   Not currently on DAPT, or AC given h/o ICH/GIB.   PT/OT.     ESRD on hemodialysis  Consult nephrology for resumption of HD.   Dialyzes M/W/F. Did not complete session this morning prior to arrival.   No current indication for emergent HD; patient is not overloaded, oxygenating adequately on RA, lytes are acceptable.   Dialyzed 9/23; resume regular HD sessions upon discharge.      Anemia in chronic kidney disease  Chronic blood loss anemia  Hg on admission 7.5, most recent trend show baseline around 8-9. Earlier, baseline appeared to be closer to 10-12. Concern for chronic GI loss vs worsening AoCD.   Check iron (consistent with AoCD), ferritin (elev), folate (wnl), B12 (elev)  Transfuse for Hg <7 g/dl. 2u PRBC ordered and given 9/21 for Hg 6.3 and suspected ongoing bleeding. Responded appropriately to 9.8 g/dl today.  Typed and crossed, consented.   CBC q 12h -->q24h  Judicious IVF prn for hemodynamics, caution due to ESRD and volume overload.   EPO per nephrology.       Consults:   Consults (From admission, onward)        Status Ordering Provider     Inpatient consult to Gastroenterology  Once     Provider:  (Not yet assigned)    Completed JOHN WHITT     Inpatient consult to Nephrology  Once     Provider:  (Not yet assigned)    Completed ALEXIS MAHAN          No new Assessment & Plan notes have been filed under this hospital  service since the last note was generated.  Service: Hospital Medicine    Final Active Diagnoses:    Diagnosis Date Noted POA    PRINCIPAL PROBLEM:  Gastrointestinal hemorrhage with hematemesis [K92.0] 08/25/2019 Yes    Gastroparesis [K31.84] 09/20/2019 Yes    Chronic blood loss anemia [D50.0] 08/25/2019 Yes    GERD with esophagitis [K21.0]  Yes    Chronic kidney disease-mineral and bone disorder [N18.9, E83.9, M89.9] 07/28/2017 Yes     Chronic    History of Intracerebral Hemorrhage: L BG 5/2013; R BG 9/2016; R BG 11/2016; L caudate head 2/2017 [Z86.79] 11/02/2016 Not Applicable     Chronic    ESRD on hemodialysis [N18.6, Z99.2] 02/07/2013 Not Applicable     Chronic    Anemia in chronic kidney disease [N18.9, D63.1] 09/17/2012 Yes     Chronic      Problems Resolved During this Admission:       Discharged Condition: stable    Disposition: Home or Self Care    Follow Up:  Follow-up Information     Yvette Zelaya NP.    Specialty:  Family Medicine  Why:  post-hospital f/u  Contact information:  [address] [facility] [address]  [phone]             [facility] GASTROENTEROLOGY In 8 weeks.    Specialty:  Gastroenterology  Why:  Rpt EGD  Contact information:  [address] [facility] [address]  [phone]               Patient Instructions:   No discharge procedures on file.    Significant Diagnostic Studies: Labs:   CMP   Recent Labs   Lab 09/22/19  0844 09/23/19  0839    137   K 4.3 4.2    101   CO2 25 28   GLU 93 86   BUN 30* 36*   CREATININE 8.3* 9.8*   CALCIUM 8.8 9.0   ANIONGAP 11 8   ESTGFRAFRICA 7.5* 6.2*   EGFRNONAA 6.5* 5.3*    and CBC   Recent Labs   Lab 09/22/19  0433 09/22/19  1632 09/23/19  0348   WBC 6.58 8.03 6.48   HGB 9.8* 9.9* 9.2*   HCT 28.7* 28.9* 26.8*    253 251       Pending Diagnostic Studies:     Procedure Component Value Units Date/Time    CBC auto differential [[id number]]     Order Status:  Sent Lab Status:  No result     Specimen:   Blood          Medications:  Reconciled Home Medications:      Medication List      CONTINUE taking these medications    acetaminophen 325 MG tablet  Commonly known as:  TYLENOL  Take 2 tablets (650 mg total) by mouth every 8 (eight) hours as needed.     carvedilol 3.125 MG tablet  Commonly known as:  COREG  Take 1 tablet (3.125 mg total) by mouth 2 (two) times daily with meals.     metoclopramide HCl 10 MG tablet  Commonly known as:  REGLAN  Take 1 tablet (10 mg total) by mouth 3 (three) times daily before meals. can be titrated up as tolerated to maximum of 40 mg/day, with addition of evening dose as indicated,     midodrine 5 MG Tab  Commonly known as:  PROAMATINE  Please take 30 min before dialysis on Monday Wednesday and Friday     ondansetron 8 MG tablet  Commonly known as:  ZOFRAN  Take 1 tablet (8 mg total) by mouth every 12 (twelve) hours as needed for Nausea.     pantoprazole 40 MG tablet  Commonly known as:  PROTONIX  Take 1 tablet (40 mg total) by mouth 2 (two) times daily before meals.     RENAPLEX-D 800 mcg-12.5 mg -2,000 unit Tab  Generic drug:  vit B,C-FA-zinc-selen-vit D3-E  Take 1 tablet by mouth once daily.     sevelamer carbonate 800 mg Tab  Commonly known as:  RENVELA  Take 2 tablets (1,600 mg total) by mouth 3 (three) times daily with meals.     sucralfate 100 mg/mL suspension  Commonly known as:  CARAFATE  Take 5 mLs (500 mg total) by mouth 2 (two) times daily.            Indwelling Lines/Drains at time of discharge:   Lines/Drains/Airways     Drain                 Hemodialysis AV Fistula Right upper arm -- days                Time spent on the discharge of patient: 35 minutes  Patient was seen and examined on the date of discharge and determined to be suitable for discharge.         Rosemary Womack MD  Department of Hospital Medicine  [facility]

## 2019-09-23 NOTE — PROGRESS NOTES
HD treatment started. No complications with access to right upper arm. Lines secured and telemetry in place. No complaints of discomfort at this time. Pt. Arrived late to HD unite therefore will only have a 2 hour treatment. Ernesto SIBLEY aware.

## 2019-09-23 NOTE — TRANSFER OF CARE
"Anesthesia Transfer of Care Note    Patient: Vaughn Retana    Procedure(s) Performed: Procedure(s) (LRB):  EGD (ESOPHAGOGASTRODUODENOSCOPY) (N/A)    Patient location: [facility]    Anesthesia Type: general    Transport from OR: Transported from OR on room air with adequate spontaneous ventilation    Post pain: adequate analgesia    Post assessment: no apparent anesthetic complications and tolerated procedure well    Post vital signs: stable    Level of consciousness: awake    Nausea/Vomiting: no nausea/vomiting    Complications: none    Transfer of care protocol was followed      Last vitals:   Visit Vitals  BP (!) 149/93 (BP Location: Left arm, Patient Position: Sitting)   Pulse 74   Temp 36.3 °C (97.3 °F) (Temporal)   Resp 16   Ht 5' 6" (1.676 m)   Wt 57.5 kg (126 lb 11.2 oz)   SpO2 100%   BMI 20.45 kg/m²     "

## 2019-09-23 NOTE — ANESTHESIA PREPROCEDURE EVALUATION
09/23/2019  Vauhgn Retana is a 54 y.o., male with ESRD and hematemesis here for EGD. No hematemesis since Saturday per pt.    Pre-operative evaluation for Procedure(s) (LRB):  EGD (ESOPHAGOGASTRODUODENOSCOPY) (N/A)    Vaughn Retana is a 54 y.o. male     Patient Active Problem List   Diagnosis    Anemia in chronic kidney disease    Secondary hyperparathyroidism of renal origin    Dyslipidemia    ESRD on hemodialysis    Peripheral vascular disease in diabetes mellitus    History of left below knee amputation 12/18/13    History of Intracerebral Hemorrhage: L BG 5/2013; R BG 9/2016; R BG 11/2016; L caudate head 2/2017    Secondary pulmonary hypertension    DVT (deep venous thrombosis)    Chronic kidney disease-mineral and bone disorder    Gastrointestinal hemorrhage    Renovascular hypertension    Abdominal pain    Chronic upper GI bleeding    Normocytic anemia    History of GI bleed    Erosive gastritis    Hemoptysis    Noncompliance    Severe malnutrition    History of TB (tuberculosis)    Hemodialysis-associated hypotension    Hematemesis with nausea    Anemia    GERD with esophagitis    Gastrointestinal hemorrhage with hematemesis    Chronic blood loss anemia    Uremia    Hepatitis C    Gastroparesis       Review of patient's allergies indicates:   Allergen Reactions    Fosrenol [lanthanum] Nausea And Vomiting     Nausea and vomiting       No current facility-administered medications on file prior to encounter.      Current Outpatient Medications on File Prior to Encounter   Medication Sig Dispense Refill    acetaminophen (TYLENOL) 325 MG tablet Take 2 tablets (650 mg total) by mouth every 8 (eight) hours as needed.  0    carvedilol (COREG) 3.125 MG tablet Take 1 tablet (3.125 mg total) by mouth 2 (two) times daily with meals. 60 tablet 11    metoclopramide HCl  (REGLAN) 10 MG tablet Take 1 tablet (10 mg total) by mouth 3 (three) times daily before meals. can be titrated up as tolerated to maximum of 40 mg/day, with addition of evening dose as indicated, 90 tablet 1    midodrine (PROAMATINE) 5 MG Tab Please take 30 min before dialysis on Monday Wednesday and Friday 60 tablet 3    ondansetron (ZOFRAN) 8 MG tablet Take 1 tablet (8 mg total) by mouth every 12 (twelve) hours as needed for Nausea. 60 tablet 1    pantoprazole (PROTONIX) 40 MG tablet Take 1 tablet (40 mg total) by mouth 2 (two) times daily before meals. 60 tablet 11    RENAPLEX-D 800 mcg-12.5 mg -2,000 unit Tab Take 1 tablet by mouth once daily.  3    sevelamer carbonate (RENVELA) 800 mg Tab Take 2 tablets (1,600 mg total) by mouth 3 (three) times daily with meals. 180 tablet 11    sucralfate (CARAFATE) 100 mg/mL suspension Take 5 mLs (500 mg total) by mouth 2 (two) times daily. 1 Bottle 3       Past Surgical History:   Procedure Laterality Date    AMPUTATION, BELOW KNEE Left 12/18/2013    Performed by Elgin Houston MD at [facility] OR 1ST FLR    COLONOSCOPY      COLONOSCOPY N/A 4/4/2017    [facility]formed by [facility] at [facility] (2ND F[facility])    EGD (ESOPHAGOGASTRODUODENOSCOPY) N/A 3/7/2019    Performed by Man Galicia MD at [facility] ENDO (2ND FLR)    EGD (ESOPHAGOGASTRODUODENOSCOPY) N/A 6/12/2018    Performed by Man Galicia MD at [facility] ENDO (2ND FLR)    ESOPHAGOGASTRODUODENOSCOPY (EGD) N/A 5/22/2018    Performed by Ke Sparks MD at [facility] ENDO (2ND FLR)    ESOPHAGOGASTRODUODENOSCOPY (EGD) N/A 3/16/2018    Performed by Kevin De La Paz MD at [facility] ENDO (2ND FLR)    ESOPHAGOGASTRODUODENOSCOPY (EGD) N/A 3/8/2018    Performed by Man Galicia MD at [facility] ENDO (2ND FLR)    ESOPHAGOGASTRODUODENOSCOPY (EGD) N/A 4/4/2017    Performed by Walker Stern MD at [facility] ENDO (2ND FLR)    ESOPHAGOGASTRODUODENOSCOPY (EGD) N/A 10/17/2014    Performed by Man Galicia MD at [facility] ENDO (2ND FLR)    FISTULOGRAM  Right 9/18/2014    [facility]ATH [facility]    FOOT AMPUTATION THROUGH METATARSAL      left foot    LEG AMPUTATION THROUGH KNEE  12/18/2013    left BKA    R AVF  9/12/12    UPPER GASTROINTESTINAL ENDOSCOPY         Social History     Socioeconomic History    Marital status:      Spouse name: Not on file    Number of children: Not on file    Years of education: Not on file    Highest education level: Not on file   Occupational History    Not on file   Social Needs    Financial resource strain: Not on file    Food insecurity:     Worry: Not on file     Inability: Not on file    Transportation needs:     Medical: Not on file     Non-medical: Not on file   Tobacco Use    Smoking status: Former Smoker     Packs/day: 1.00     Years: 10.00     Pack years: 10.00    Smokeless tobacco: Never Used   Substance and Sexual Activity    Alcohol use: No    Drug use: No    Sexual activity: Yes     Partners: Female     Birth control/protection: None   Lifestyle    Physical activity:     Days per week: Not on file     Minutes per session: Not on file    Stress: Not on file   Relationships    Social connections:     Talks on phone: Not on file     Gets together: Not on file     Attends Worship service: Not on file     Active member of club or organization: Not on file     Attends meetings of clubs or organizations: Not on file     Relationship status: Not on file   Other Topics Concern    Not on file   Social History Narrative    Not on file         CBC:   Recent Labs     09/22/19  1632 09/23/19  0348   WBC 8.03 6.48   RBC 3.10* 2.86*   HGB 9.9* 9.2*   HCT 28.9* 26.8*    251   MCV 93 94   MCH 31.9* 32.2*   MCHC 34.3 34.3       CMP:   Recent Labs     09/21/19  0455 09/22/19  0844 09/23/19  0839    140 137   K 2.9* 4.3 4.2    104 101   CO2 28 25 28   BUN 20 30* 36*   CREATININE 6.6* 8.3* 9.8*   GLU 72 93 86   MG 2.2 2.4  --    PHOS 1.8*  --   --    CALCIUM 8.1*  8.8 9.0   ALBUMIN 2.1*  --   --    PROT 5.3*  --   --    ALKPHOS 175*  --   --    ALT 7*  --   --    AST 16  --   --    BILITOT 0.2  --   --        INR  Recent Labs     09/21/19  0455   INR 1.0               2D Echo:  Results for orders placed or performed during the hospital encounter of 07/24/17   Echo doppler color flow   Result Value Ref Range    QEF 65 55 - 65    Diastolic Dysfunction No          Anesthesia Evaluation    I have reviewed the Patient Summary Reports.    I have reviewed the Nursing Notes.   I have reviewed the Medications.     Review of Systems  Anesthesia Hx:  No problems with previous Anesthesia    Hematology/Oncology:  Hematology Normal   Oncology Normal     EENT/Dental:EENT/Dental Normal   Cardiovascular:   Hypertension CHF    Pulmonary:  Pulmonary Normal Denies Pneumonia    Renal/:   Chronic Renal Disease, ESRD, Dialysis    Hepatic/GI:   PUD, GERD Liver Disease, Hepatitis    Musculoskeletal:  Musculoskeletal Normal    Neurological:  Neurology Normal    Endocrine:   Diabetes    Dermatological:  Skin Normal    Psych:  Psychiatric Normal           Physical Exam  General:  Well nourished    Airway/Jaw/Neck:  Airway Findings: Mouth Opening: Normal Tongue: Normal  General Airway Assessment: Adult  Mallampati: II  Jaw/Neck Findings:  Neck ROM: Normal ROM     Eyes/Ears/Nose:  Eyes/Ears/Nose Findings:    Dental:  Dental Findings: In tact   Chest/Lungs:  Chest/Lungs Findings: Clear to auscultation, Normal Respiratory Rate     Heart/Vascular:  Heart Findings: Rate: Normal  Rhythm: Regular Rhythm  Sounds: Normal  Heart Murmur  Vascular Findings:        Mental Status:  Mental Status Findings:  Cooperative, Alert and Oriented         Anesthesia Plan  Type of Anesthesia, risks & benefits discussed:  Anesthesia Type:  general, MAC  Patient's Preference: General/MAC  Intra-op Monitoring Plan: standard ASA monitors  Intra-op Monitoring Plan Comments:   Post Op Pain Control Plan:   Post Op Pain Control Plan  Comments: IV meds as needed  Induction:   IV  Beta Blocker:  Patient is not currently on a Beta-Blocker (No further documentation required).       Informed Consent: Patient understands risks and agrees with Anesthesia plan.  Questions answered. Anesthesia consent signed with patient.  ASA Score: 4     Day of Surgery Review of History & Physical:    H&P update referred to the provider.     Anesthesia Plan Notes: Discussed anesthetic options, pt understands and agrees with plan        Ready For Surgery From Anesthesia Perspective.

## 2019-09-23 NOTE — NURSING TRANSFER
Nursing Transfer Note      9/23/2019     Transfer To: 624A    Transfer via stretcher    Transfer with cardiac monitoring    Transported by RN    Medicines sent: none    Chart send with patient: Yes    Notified: RN on floor.     Patient reassessed at: now and arrival to floor.     Upon arrival to floor: cardiac monitor applied, patient oriented to room, call bell in reach and bed in lowest position

## 2019-09-24 NOTE — ANESTHESIA POSTPROCEDURE EVALUATION
Anesthesia Post Evaluation    Patient: Vaughn Retana    Procedure(s) Performed: Procedure(s) (LRB):  EGD (ESOPHAGOGASTRODUODENOSCOPY) (N/A)    Final Anesthesia Type: general  Patient location during evaluation: PACU  Patient participation: Yes- Able to Participate  Level of consciousness: awake and alert  Post-procedure vital signs: reviewed and stable  Pain management: adequate  Airway patency: patent  PONV status at discharge: No PONV  Anesthetic complications: no      Cardiovascular status: blood pressure returned to baseline  Respiratory status: unassisted  Hydration status: euvolemic  Follow-up not needed.          Vitals Value Taken Time   /66 9/23/2019  6:00 PM   Temp 36.3 °C (97.4 °F) 9/23/2019  4:10 PM   Pulse 87 9/23/2019  6:00 PM   Resp 18 9/23/2019  6:00 PM   SpO2 100 % 9/23/2019  2:44 PM   Vitals shown include unvalidated device data.      No case tracking events are documented in the log.      Pain/Haseeb Score: Haseeb Score: 10 (9/23/2019 12:38 PM)

## 2019-09-24 NOTE — PROGRESS NOTES
HD treatment complete. Duration of treatment 2 hours and 1.5 L removed. Treatment was tolerated well and no complications with access to right upper arm. Needles removed and hemostasis achieved. Dressing intact and no drainage noted. Thrill and bruit present.

## 2019-09-26 NOTE — TELEPHONE ENCOUNTER
----- Message from Malika Hernandez sent at 9/26/2019 10:14 AM CDT -----  Contact: Alicia (sister): [phone]      ----- Message -----  From: George Patel  Sent: 9/26/2019  10:06 AM CDT  To: Malika Mandujano told me to send the message to monty huffman  ----- Message -----  From: Malika Hernandez  Sent: 9/26/2019  10:05 AM CDT  To: George Patel    Do we have availability?  ----- Message -----  From: George Patel  Sent: 9/26/2019   9:25 AM CDT  To: Monty L[facility] Staff    Pt's sister received a call to r/s the pt's appt    Alicia (sister): [phone]

## 2019-09-26 NOTE — TELEPHONE ENCOUNTER
----- Message from Ismael Pardo RN sent at 9/26/2019 10:39 AM CDT -----  Contact: Alicia (sister): [phone]  No answer, left message. If they call back offer appointment with Mike tomorrow at 1:30  ----- Message -----  From: Malika Hernandez  Sent: 9/26/2019  10:14 AM CDT  To: Ismael Pardo RN        ----- Message -----  From: George Patel  Sent: 9/26/2019  10:06 AM CDT  To: Malika Mandujano told me to send the message to monty huffman  ----- Message -----  From: Malika Hernandez  Sent: 9/26/2019  10:05 AM CDT  To: George Patel    Do we have availability?  ----- Message -----  From: George Patel  Sent: 9/26/2019   9:25 AM CDT  To: Monty LANE Staff    Pt's sister received a call to r/s the pt's appt    Alicia (sister): [phone]

## 2019-09-26 NOTE — TELEPHONE ENCOUNTER
Returned patient sister call, no answer. Left message for patient to return my call regarding scheduling an appointment.

## 2019-09-30 PROBLEM — R11.15 EMESIS, PERSISTENT: Status: ACTIVE | Noted: 2019-01-01

## 2019-09-30 NOTE — ED NOTES
Pt sleeping, easily woken. rr even and unlabored on ra. Pt denies nausea at this time. Pt updated on pending test results. Pt verbalized understanding.

## 2019-09-30 NOTE — ED NOTES
"Pt sleeping, easily woken. rr even and unlabored on ra. Nadn. Pt states "I am feeling better than earlier."  "

## 2019-09-30 NOTE — ED PROVIDER NOTES
Encounter Date: 9/29/2019       History     Chief Complaint   Patient presents with    Nausea     To ER per EMS with EMS reporting nausea and vomiting x 30 minutes.  Pt refuses to speak to triage nurse because she talks to loud.    Vomiting     54-year-old male presents to the emergency department by EMS for nausea, vomiting.  Onset 30 or 45 min prior to arrival.  Patient states he has had a few episodes of nonbloody, nonbilious emesis.  EMS reports he has been spitting up some clear sputum/phlegm, but otherwise has not been vomiting. No other symptoms reported at this time.        Review of patient's allergies indicates:   Allergen Reactions    Fosrenol [lanthanum] Nausea And Vomiting     Nausea and vomiting     Past Medical History:   Diagnosis Date    Amputation stump pain 9/10/2013    Aspiration pneumonia 7/27/2015    Asterixis 11/8/2016    C. difficile colitis 8/7/2015    Cholelithiasis without obstruction 8/25/2015    Chronic diastolic heart failure     2-23-17   1 - Low normal to mildly depressed left ventricular systolic function (EF 50-55%).    2 - Right ventricular enlargement with mildly depressed systolic function.    3 - Left ventricular diastolic dysfunction.    4 - Right atrial enlargement.    5 - Severe tricuspid regurgitation.    6 - Pulmonary hypertension. The estimated PA systolic pressure is 86 mmHg.    7 - Increased central venous pressure.    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 8/1/2015    Metabolic acidosis, IAG, reduced excretion of inorganic acids     Myoclonic jerking 9/20/2016    Noncompliance with medication regimen 12/4/2018    Secondary hyperparathyroidism (of renal origin)     Secondary pulmonary hypertension 3/23/2017    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    TB lung, latent 08/25/2015    Negative Quantiferon Gold 3-23-17     Past Surgical History:   Procedure Laterality Date    COLONOSCOPY      COLONOSCOPY N/A 4/4/2017    Procedure: COLONOSCOPY;  Surgeon: Walker Stern MD;  [facility]  Service: Endoscopy;  Laterality: N/A;  PA Systolic Pressure 85.56. HD Patient MWF, K+ lab prior to procedure.     ESOPHAGOGASTRODUODENOSCOPY N/A 6/12/2018    Procedure: EGD (ESOPHAGOGASTRODUODENOSCOPY);  Surgeon: Man Galicia MD;  [facility]  Service: Endoscopy;  Laterality: N/A;  EGD in 8-12 weeks with Dr. Galicia on 4th floor for follow up erosive esophagitis and Mejia's surveillance.    Patient should be on Pantoprazole 40mg every 12 hours or the equivulant of another PPI    awaiting for patient to reply back regarding changing    ESOPHAGOGASTRODUODENOSCOPY N/A 3/7/2019    Procedure: EGD (ESOPHAGOGASTRODUODENOSCOPY);  Surgeon: Man Galicia MD;  [facility]  Service: Endoscopy;  Laterality: N/A;    ESOPHAGOGASTRODUODENOSCOPY N/A 9/23/2019    Procedure: EGD (ESOPHAGOGASTRODUODENOSCOPY);  Surgeon: Keanu Rainey MD;  Location: [facility] ([address] [place] [address]);  Service: Endoscopy;  Laterality: N/A;    FOOT AMPUTATION THROUGH METATARSAL      left foot    LEG AMPUTATION THROUGH KNEE  12/18/2013    left BKA    R AVF  9/12/12    UPPER GASTROINTESTINAL ENDOSCOPY       Family History   Problem Relation Age of Onset    Early death Mother     Kidney disease Father     Hypertension Father     Heart disease Father     Hyperlipidemia Father     Diabetes Brother     Alcohol abuse Maternal Grandmother      Diabetes Maternal Grandfather     Hypertension Sister     Melanoma Neg Hx      Social History     Tobacco Use    Smoking status: Former Smoker     Packs/day: 1.00     Years: 10.00     Pack years: 10.00    Smokeless tobacco: Never Used   Substance Use Topics    Alcohol use: No    Drug use: No     Review of Systems   Reason unable to perform ROS: Patient uncooperative with ROS.       Physical Exam     Initial Vitals [09/29/19 2308]   BP Pulse Resp Temp SpO2   127/85 (!) 111 15 98.5 °F (36.9 °C) 99 %      MAP       --         Physical Exam    Nursing note and vitals reviewed.  Constitutional: He appears well-developed and well-nourished. No distress.   HENT:   Head: Normocephalic and atraumatic.   Eyes: Conjunctivae and EOM are normal. Pupils are equal, round, and reactive to light.   Neck: Normal range of motion. Neck supple. No tracheal deviation present.   Cardiovascular: Normal rate, regular rhythm, normal heart sounds and intact distal pulses.   Pulmonary/Chest: Breath sounds normal. No respiratory distress. He has no wheezes. He has no rhonchi. He has no rales.   Abdominal: Soft. Bowel sounds are normal. He exhibits no distension. There is no tenderness.   Musculoskeletal: Normal range of motion. He exhibits no tenderness.   Left BKA noted   Neurological: He is alert. He has normal strength.   Skin: Skin is warm and dry.         ED Course   Procedures  Labs Reviewed   BASIC METABOLIC PANEL            X-Rays:   Independently Interpreted Readings:   Other Readings:  Imaging interpreted by radiologist and visualized by me     Imaging Results          X-Ray Abdomen AP 1 View (KUB) (Final result)  Result time 09/30/19 00:18:39    Final result by Vijay Taylor MD (09/30/19 00:18:39)                 Impression:      There is a suspected gallstone at the right upper quadrant.    The bowel gas pattern is nonspecific mild air and stool along the colon without abnormal bowel distention.    There is no  additional radiographic evidence for acute abdominal process.      Electronically signed by: Vijay Taylor  Date:    09/30/2019  Time:    00:18             Narrative:    EXAMINATION:  XR ABDOMEN AP 1 VIEW    CLINICAL HISTORY:  Unspecified abdominal pain    TECHNIQUE:  AP View(s) of the abdomen was performed.    COMPARISON:  March 6, 2019    FINDINGS:  Single abdominal radiograph is submitted.  Calcification of the right upper quadrant may relate to a gallstone.  Additional calcifications may be vascular.  The bowel gas pattern is nonspecific, there is mild air and stool along the colon without abnormal distention there is no abnormal small bowel distention appreciated radiographically.  The osseous structures demonstrate chronic change there is prominent appearance of the transverse process at L5 bilaterally, more prominent on the right likely variant anatomic configuration and appears stable.                                Medical Decision Making:   Initial Assessment:   54-year-old male brought to the emergency department by EMS for nausea, vomiting  Independently Interpreted Test(s):   I have ordered and independently interpreted X-rays - see prior notes.  Clinical Tests:   Lab Tests: Reviewed       <> Summary of Lab: Consistent with baseline  ED Management:  Patient given some IM Reglan and states he is feeling better.  No complaints at this time.  Still refuses to expand on his chief complaint.  Informed him of plan to discharge and he is comfortable with plan at this time.  Vital signs stable.                      Clinical Impression:       ICD-10-CM ICD-9-CM   1. Non-intractable vomiting with nausea, unspecified vomiting type R11.2 787.01   2. Abdominal pain R10.9 789.00         Disposition:   Disposition: Discharged  Condition: Stable                        Tylor Ramirez MD  09/30/19 0059

## 2019-09-30 NOTE — ED NOTES
"Pt presents to the ED via  EMS with c/o nausea. Pt reports being nauseous "all day." pt denies abdominal pain, headache, cp, sob or weakness at this time. Pt is a dialysis pt states he goes M/W/F, last dialysis was Friday. Pt only speaking in short sentences. Pt not wanting to talk to nurse assessing pt.     APPEARANCE: Alert, oriented and in no acute distress.  CARDIAC: Normal rate and rhythm.   PERIPHERAL VASCULAR: peripheral pulses present. Normal cap refill. No edema. Warm to touch.    RESPIRATORY:Normal rate and effort, breath sounds clear bilaterally throughout chest. Respirations are equal and unlabored no obvious signs of distress.  GASTRO: soft, bowel sounds normal, no tenderness, no abdominal distention. +nausea  MUSC: Full ROM. No bony tenderness or soft tissue tenderness. No obvious deformity. +RICHARD fistula +bruit/+thrill  SKIN: Skin is warm and dry, normal skin turgor, mucous membranes moist.  NEURO: 5/5 strength major flexors/extensors bilaterally. Sensory intact to light touch bilaterally. Milo coma scale: eyes open spontaneously-4, oriented & converses-5, obeys commands-6. No neurological abnormalities.   MENTAL STATUS: awake, alert and aware of environment.  EYE: PERRL, both eyes: pupils brisk and reactive to light. Normal size.  ENT: EARS: no obvious drainage. NOSE: no active bleeding.         "

## 2019-09-30 NOTE — ED TRIAGE NOTES
Pt has been vomiting dark red blood since 8 am this morning. Pt is currently vomiting    LOC: The patient is awake, alert, and oriented to place, time, situation. Affect is appropriate.  Speech is appropriate and clear.     APPEARANCE: Patient is currently vomiting.  Patient is clean and well groomed.    SKIN: The skin is warm and dry; color consistent with ethnicity.  Patient has normal skin turgor and moist mucus membranes.  Skin intact; no breakdown or bruising noted.     MUSCULOSKELETAL: Patient moving upper and lower extremities without difficulty.  Denies weakness.     RESPIRATORY: Airway is open and patent. Respirations spontaneous, even, easy, and non-labored.  Patient has a normal effort and rate.  No accessory muscle use noted. Denies cough.     CARDIAC:  Sinus tachycardia noted.  No peripheral edema noted. No complaints of chest pain.      ABDOMEN: Soft and non tender to palpation.  No distention noted.     NEUROLOGIC: Eyes open spontaneously.  Behavior appropriate to situation.  Follows commands; facial expression symmetrical.  Purposeful motor response noted; normal sensation in all extremities.

## 2019-09-30 NOTE — ED NOTES
Pt educated on discharge instructions, medications, and follow up care. Pt verbalized understanding.

## 2019-10-01 NOTE — SUBJECTIVE & OBJECTIVE
Interval History: NAEON.  Patient noted to be hiccupping and dry heaving today.  He states that he has not coughed up or vomited blood since yesterday.     Review of Systems   Constitutional: Negative for fatigue and fever.   HENT: Negative for congestion.    Eyes: Negative for visual disturbance.   Respiratory: Positive for cough. Negative for shortness of breath.    Cardiovascular: Negative for chest pain.   Gastrointestinal: Negative for abdominal pain.   Genitourinary: Negative for dysuria.   Skin: Negative for rash.   Neurological: Negative for headaches.     Objective:     Vital Signs (Most Recent):  Temp: 97.8 °F (36.6 °C) (10/01/19 1310)  Pulse: 100 (10/01/19 1310)  Resp: 18 (10/01/19 1310)  BP: 104/64 (10/01/19 1310)  SpO2: 95 % (10/01/19 1310) Vital Signs (24h Range):  Temp:  [97.8 °F (36.6 °C)-99.4 °F (37.4 °C)] 97.8 °F (36.6 °C)  Pulse:  [] 100  Resp:  [12-18] 18  SpO2:  [95 %-100 %] 95 %  BP: (104-136)/(55-85) 104/64        There is no height or weight on file to calculate BMI.    Intake/Output Summary (Last 24 hours) at 10/1/2019 1451  Last data filed at 9/30/2019 2305  Gross per 24 hour   Intake 1600 ml   Output --   Net 1600 ml      Physical Exam   Constitutional: He is oriented to person, place, and time.   Patient is lying bed, appears alert, and in no acute distress.    HENT:   Head: Normocephalic and atraumatic.   Eyes: Pupils are equal, round, and reactive to light. EOM are normal.   Neck: Normal range of motion. No thyromegaly present.   Cardiovascular: Normal rate, regular rhythm and intact distal pulses.   Pulmonary/Chest: Effort normal and breath sounds normal. No respiratory distress.   Abdominal: Soft. Bowel sounds are normal. He exhibits no distension. There is no tenderness.   Musculoskeletal: Normal range of motion.   L BKA    Neurological: He is alert and oriented to person, place, and time.   Delayed when answering questions.   Psychiatric: He has a normal mood and affect.      Significant Labs: All pertinent labs within the past 24 hours have been reviewed.     Recent Labs   Lab 09/30/19  1711 09/30/19  1925 10/01/19  0005 10/01/19  0443 10/01/19  1107   WBC 10.94  --   --   --  9.57   HGB 8.7*  --   --   --  7.0*   HCT 26.4*  --   --   --  21.7*     --   --   --  200   *  --   --   --  101*   RDW 14.3  --   --   --  14.6*    144 141 142  --    K 3.4* 2.9* 4.1 4.0  --     107 103 105  --    CO2 27 27 30* 29  --    BUN 15 14 14 16  --    CREATININE 3.6* 3.5* 4.1* 4.5*  --    * 107 136* 117*  --    PROT 5.4*  --   --  5.2*  --    ALBUMIN 2.0*  --   --  1.9*  --    BILITOT 0.2  --   --  0.2  --    AST 17  --   --  24  --    ALKPHOS 141*  --   --  127  --    ALT 6*  --   --  9*  --        Significant Imaging: I have reviewed all pertinent imaging results/findings within the past 24 hours.

## 2019-10-01 NOTE — PLAN OF CARE
[facility]    HOME HEALTH ORDERS  FACE TO FACE ENCOUNTER    Patient Name: Vaughn Retana  YOB: 1965    PCP: Primary Doctor No   PCP Address: None  PCP Phone Number: None  PCP Fax: None    Encounter Date: 10/01/2019    Admit to Home Health    Diagnoses:  Active Hospital Problems    Diagnosis  POA    *Gastrointestinal hemorrhage with hematemesis [K92.0]  Yes    Gastroparesis [K31.84]  Yes    GERD with esophagitis [K21.0]  Yes    Chronic kidney disease-mineral and bone disorder [N18.9, E83.9, M89.9]  Yes     Chronic    History of Intracerebral Hemorrhage: L BG 5/2013; R BG 9/2016; R BG 11/2016; L caudate head 2/2017 [Z86.79]  Not Applicable     Chronic    ESRD on hemodialysis [N18.6, Z99.2]  Not Applicable     Chronic     MWF at [facility]      Anemia in chronic kidney disease [N18.9, D63.1]  Yes     Chronic    Secondary hyperparathyroidism of renal origin [N25.81]  Yes     Chronic      Resolved Hospital Problems   No resolved problems to display.       No future appointments.        I have seen and examined this patient face to face today. My clinical findings that support the need for the home health skilled services and home bound status are the following:  Weakness/numbness causing balance and gait disturbance due to Stroke, Coronary Heart Disease, Joint Replacement, Weakness/Debility, Anemia and Dehydration making it taxing to leave home.  Requiring assistive device to leave home due to unsteady gait caused by  Heart Failure, Joint Replacement, Weakness/Debility, Anemia and Dehydration.  Patient with medication mismanagement issues requiring home bound status as evidenced by  Poor understanding of medication regimen/dosage and Poor adherence to medication regimen/dosage.  Medical restrictions requiring assistance of another human to leave home due to  Unstable ambulation and Decreased range of motions in extremities.  Mental confusion making it unsafe for patient to  leave home alone due to  Dementia.    Allergies:  Review of patient's allergies indicates:   Allergen Reactions    Fosrenol [lanthanum] Nausea And Vomiting     Nausea and vomiting       Diet: renal diet    Activities: activity as tolerated    Nursing:   SN to complete comprehensive assessment including routine vital signs. Instruct on disease process and s/s of complications to report to MD. Review/verify medication list sent home with the patient at time of discharge  and instruct patient/caregiver as needed. Frequency may be adjusted depending on start of care date.    Notify MD if SBP > 160 or < 90; DBP > 90 or < 50; HR > 120 or < 50; Temp > 101      CONSULTS:    Physical Therapy to evaluate and treat. Evaluate for home safety and equipment needs; Establish/upgrade home exercise program. Perform / instruct on therapeutic exercises, gait training, transfer training, and Range of Motion.  Occupational Therapy to evaluate and treat. Evaluate home environment for safety and equipment needs. Perform/Instruct on transfers, ADL training, ROM, and therapeutic exercises.  Speech Therapy  to evaluate and treat for  Cognition.   to evaluate for community resources/long-range planning.  Aide to provide assistance with personal care, ADLs, and vital signs.      WOUND CARE ORDERS  n/a      Medications: Review discharge medications with patient and family and provide education.      Current Discharge Medication List      CONTINUE these medications which have CHANGED    Details   pantoprazole (PROTONIX) 40 MG tablet Take 1 tablet (40 mg total) by mouth 2 (two) times daily.  Qty: 60 tablet, Refills: 6      sucralfate (CARAFATE) 100 mg/mL suspension Take 5 mLs (500 mg total) by mouth 2 (two) times daily.  Qty: 420 mL, Refills: 3         CONTINUE these medications which have NOT CHANGED    Details   acetaminophen (TYLENOL) 325 MG tablet Take 2 tablets (650 mg total) by mouth every 8 (eight) hours as needed.  Refills:  0      carvedilol (COREG) 3.125 MG tablet Take 1 tablet (3.125 mg total) by mouth 2 (two) times daily with meals.  Qty: 60 tablet, Refills: 11      metoclopramide HCl (REGLAN) 10 MG tablet Take 1 tablet (10 mg total) by mouth 3 (three) times daily before meals. can be titrated up as tolerated to maximum of 40 mg/day, with addition of evening dose as indicated,  Qty: 90 tablet, Refills: 1      midodrine (PROAMATINE) 5 MG Tab Please take 30 min before dialysis on Monday Wednesday and Friday  Qty: 60 tablet, Refills: 3      ondansetron (ZOFRAN) 8 MG tablet Take 1 tablet (8 mg total) by mouth every 12 (twelve) hours as needed for Nausea.  Qty: 60 tablet, Refills: 1    Associated Diagnoses: Dyslipidemia; Renovascular hypertension; History of spontaneous intraparenchymal intracranial hemorrhage associated with hypertension      RENAPLEX-D 800 mcg-12.5 mg -2,000 unit Tab Take 1 tablet by mouth once daily.  Refills: 3      sevelamer carbonate (RENVELA) 800 mg Tab Take 2 tablets (1,600 mg total) by mouth 3 (three) times daily with meals.  Qty: 180 tablet, Refills: 11         STOP taking these medications       prochlorperazine (COMPAZINE) 10 MG tablet Comments:   Reason for Stopping:               I certify that this patient is confined to his home and needs intermittent skilled nursing care, physical therapy, speech therapy and occupational therapy.

## 2019-10-01 NOTE — TREATMENT PLAN
GI Treatment Plan    Vaughn Retana is a 54 y.o. male admitted to hospital 9/30/2019 (Hospital Day: 2) due to Gastrointestinal hemorrhage with hematemesis.     Interval History  - decline in H&H on labs 8.7 -> 7.0  - per nursing no additional vomitus today and no BM  - patient denies new complaints with exception of hiccups    Laboratory    Recent Labs   Lab 09/30/19  1711 10/01/19  1107   HGB 8.7* 7.0*       Plan  - continue supportive care (see consult note)  - patient amenable to EGD tomorrow  - NPO at [facility]  - Plan of care was discussed with primary team.  - We will continue to follow.    Thank you for involving us in the care of Vaughn Retana. Please call with any additional questions, concerns or changes in the patient's clinical status.    Yimi Jacques MD  Gastroenterology Fellow  Spectralink: [id number]

## 2019-10-01 NOTE — H&P
[facility] Hospital Medicine  History & Physical    Patient Name: Vaughn Retana  MRN: [id number]  Admission Date: 9/30/2019  Attending Physician: Lainey Wheat MD   Primary Care Provider: Primary Doctor [facility] Medicine Team: [facility] 3 Dilip Abbott MD     Patient information was obtained from patient, past medical records and ER records.     Subjective:     Principal Problem:Gastrointestinal hemorrhage with hematemesis    Chief Complaint:   Chief Complaint   Patient presents with    Emesis     from diaylsis recieved full tx. dark brown emesis. Hx of esphogeal varicies         HPI: Mr. Retana is a 55 yo AAM with ESRD on dialysis MWF, L below knee amputation 2/2 osteomyelitis, dCHF, GERD, h/o multiple ICH w/o residual deficits, who presented to [facility] [place] with primary complaint of hematemesis. Patient is a poor historian and has limited knowledge of his medical history. During the interview the patient would not cooperate and provide much/any history. History is via ED physician and chart review.    Patient presenting to ED with a c/c of hematemesis x 2 episodes. Of note patient was evaluated in the ED yesterday (9/29) for a similar compliant. During that visit he was given IV reglan and similiarly failed to elaporate on his chief complaint and was discharged. Prior to this visit, patient was admitted to [facility] (9/20-9/23) for hematemesis, he received a total 2U PRBC and underwent endoscopy on 9/23 which showed some evidence of erosive esophagitis but no active or ongoing bleeding. At time of discharge Hg noted to be 10.2 (9/23) and today 8.7 (9/30).    Per chart review patient he was seen in the ED yesterday, and vomiting resolved with 1 dose of IM Reglan. He returned today (9/30) with persistent coffee ground emesis. He denies blood in his stool. Also denies hemoptysis. He is minimally responsive when asked about his symptoms and ROS. Last dialyzed today (M/W/F HD schedule).      In the ED patient is hemodynamically stable. PPI 80mg IV, 500cc bolus x 2 doses, type and screen, blood consent and evaluated by GI.       PREVIOUS HISTORY:     The patient was discharged from the hospital on 9/6/19 for similar symptoms of abdominal pain with intractable nausea and vomiting and was evaluated by GI at that time as well. However, a repeat EGD was not done due to the patient being seen a [facility] earlier in August and having had an EGD done on 8/2 showing grade A esophagitis. A NM gastric emptying study was done during the last admission that was suggestive of gastroparesis retaining 50% of his gastric contents 4 hours after meals. He was placed on a PPI 40 mg BID, Reglan TID before meals, and Carafate BID and reports being compliant with his medications.     The following is a break down of his GI procedure history:    9/23/19- EGD [facility] - LA Grade D reflux esophagitis. Medium-sized hiatal hernia. No active GI bleed.    8/22/19 - EGD [facility] - LA grade A reflux esophagitis with no active bleeding     3/7/2019 - EGD [facility] - LA Grade D reflux esophagitis involving the entire esophagus  with a 2 cm sliding hiatal hernia. Congestive gastropathy, erythematous doudenopathy. No specimens collected     5/22/2018 - EGD [facility] - LA grade D reflux esophagitis with small hiatal hernia. Normal stomach and duodenum. No specimens collected     3/16/2018 - EGD [facility] - LA grade C reflux esophagitis with non-bleeding gastric ulcer with a clean base and a 4 cm hiatal hernia. 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 Normal duodenum     3/8/2018 - EGD [facility] - LA Grade B reflux esophagitis with a 3 cm hiatal hernia. Gastric polyp no biopsies. Normal duodenum     4/4/2017 - EGD [facility] -  LA Grade D reflux esophagitis with a small hiatal hernia. Normal stomach and duodenum     4/4/2017 - Colonoscopy [facility] - 3 mm polyp removed from the transverse colon pathology adenomatous. 8 mm  polyp found in the sigmoid colon removed Pathology adenovillous. Suggestion to repeat in 3 years    Past Medical History:   Diagnosis Date    Amputation stump pain 9/10/2013    Aspiration pneumonia 7/27/2015    Asterixis 11/8/2016    C. difficile colitis 8/7/2015    Cholelithiasis without obstruction 8/25/2015    Chronic diastolic heart failure     2-23-17   1 - Low normal to mildly depressed left ventricular systolic function (EF 50-55%).    2 - Right ventricular enlargement with mildly depressed systolic function.    3 - Left ventricular diastolic dysfunction.    4 - Right atrial enlargement.    5 - Severe tricuspid regurgitation.    6 - Pulmonary hypertension. The estimated PA systolic pressure is 86 mmHg.    7 - Increased central venous pressure.    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    Metabolic acidosis, IAG, reduced excretion of inorganic acids     Myoclonic jerking 9/20/2016    Noncompliance with medication regimen 12/4/2018    Secondary hyperparathyroidism (of renal origin)     Secondary pulmonary hypertension 3/23/2017    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    TB lung, latent 08/25/2015    Negative Quantiferon Gold 3-23-17       Past Surgical History:   Procedure Laterality Date    COLONOSCOPY      COLONOSCOPY N/A 4/4/2017    Procedure: COLONOSCOPY;  Surgeon: Walker Stern MD;  Location: [address]);  Service: Endoscopy;  Laterality: N/A;   PA Systolic Pressure 85.56. HD Patient MWF, K+ lab prior to procedure.     ESOPHAGOGASTRODUODENOSCOPY N/A 6/12/2018    Procedure: EGD (ESOPHAGOGASTRODUODENOSCOPY);  Surgeon: Man Galicia MD;  Location: [facility] (2ND FLR);  Service: Endoscopy;  Laterality: N/A;  EGD in 8-12 weeks with Dr. Galicia on 4th floor for follow up erosive esophagitis and Mejia's surveillance.    Patient should be on Pantoprazole 40mg every 12 hours or the equivulant of another PPI    awaiting for patient to reply back regarding changing    ESOPHAGOGASTRODUODENOSCOPY N/A 3/7/2019    Procedure: EGD (ESOPHAGOGASTRODUODENOSCOPY);  Surgeon: Man Galicia MD;  Location: [facility] (2ND FLR);  Service: Endoscopy;  Laterality: N/A;    ESOPHAGOGASTRODUODENOSCOPY N/A 9/23/2019    Procedure: EGD (ESOPHAGOGASTRODUODENOSCOPY);  Surgeon: Keanu Rainey MD;  [facility]  Service: Endoscopy;  Laterality: N/A;    FOOT AMPUTATION THROUGH METATARSAL      left foot    LEG AMPUTATION THROUGH KNEE  12/18/2013    left BKA    R AVF  9/12/12    UPPER GASTROINTESTINAL ENDOSCOPY         Review of patient's allergies indicates:   Allergen Reactions    Fosrenol [lanthanum] Nausea And Vomiting     Nausea and vomiting       Current Facility-Administered Medications on File Prior to Encounter   Medication    [COMPLETED] metoclopramide HCl injection 10 mg     Current Outpatient Medications on File Prior to Encounter   Medication Sig    acetaminophen (TYLENOL) 325 MG tablet Take 2 tablets (650 mg total) by mouth every 8 (eight) hours as needed.    carvedilol (COREG) 3.125 MG tablet Take 1 tablet (3.125 mg total) by mouth 2 (two) times daily with meals.    metoclopramide HCl (REGLAN) 10 MG tablet Take 1 tablet (10 mg total) by mouth 3 (three) times daily before meals. can be titrated up as tolerated to maximum of 40 mg/day, with addition of evening dose as indicated,    midodrine (PROAMATINE) 5 MG Tab Please take 30 min before  dialysis on Monday Wednesday and Friday    ondansetron (ZOFRAN) 8 MG tablet Take 1 tablet (8 mg total) by mouth every 12 (twelve) hours as needed for Nausea.    pantoprazole (PROTONIX) 40 MG tablet Take 1 tablet (40 mg total) by mouth 2 (two) times daily before meals.    prochlorperazine (COMPAZINE) 10 MG tablet Take 1 tablet (10 mg total) by mouth every 8 (eight) hours as needed (Nausea).    RENAPLEX-D 800 mcg-12.5 mg -2,000 unit Tab Take 1 tablet by mouth once daily.    sevelamer carbonate (RENVELA) 800 mg Tab Take 2 tablets (1,600 mg total) by mouth 3 (three) times daily with meals.    sucralfate (CARAFATE) 100 mg/mL suspension Take 5 mLs (500 mg total) by mouth 2 (two) times daily.     Family History     Problem Relation (Age of Onset)    Alcohol abuse Maternal Grandmother    Diabetes Brother, Maternal Grandfather    Early death Mother    Heart disease Father    Hyperlipidemia Father    Hypertension Father, Sister    Kidney disease Father        Tobacco Use    Smoking status: Former Smoker     Packs/day: 1.00     Years: 10.00     Pack years: 10.00    Smokeless tobacco: Never Used   Substance and Sexual Activity    Alcohol use: No    Drug use: No    Sexual activity: Yes     Partners: Female     Birth control/protection: None     Review of Systems   Unable to perform ROS: Other (patient not cooperative during exam, )   Constitutional: Negative for chills and fever.   Respiratory: Negative for chest tightness and shortness of breath.    Gastrointestinal: Positive for vomiting. Negative for blood in stool, constipation and diarrhea.        Hematemesis      Objective:     Vital Signs (Most Recent):  Temp: 99.3 °F (37.4 °C) (09/30/19 1930)  Pulse: (!) 112 (09/30/19 1930)  Resp: 16 (09/30/19 1930)  BP: (!) 111/57 (09/30/19 1930)  SpO2: 100 % (09/30/19 1930) Vital Signs (24h Range):  Temp:  [98.1 °F (36.7 °C)-99.3 °F (37.4 °C)] 99.3 °F (37.4 °C)  Pulse:  [107-118] 112  Resp:  [14-18] 16  SpO2:  [99 %-100 %]  100 %  BP: (108-127)/(55-85) 111/57        There is no height or weight on file to calculate BMI.    Physical Exam   Constitutional: He is uncooperative. He does not appear ill. No distress.   HENT:   Head: Normocephalic and atraumatic.   Eyes: Pupils are equal, round, and reactive to light. EOM are normal.   Neck: Normal range of motion. Neck supple.   Cardiovascular: Regular rhythm. Tachycardia present.   No murmur heard.  Pulmonary/Chest: Effort normal and breath sounds normal. No respiratory distress.   Abdominal: Soft. He exhibits no distension. There is no tenderness. There is no guarding.   Musculoskeletal: He exhibits deformity. He exhibits no tenderness.        Arms:  Left BKA noted   Prosthetic leg    Neurological: He is alert.   Skin: Skin is warm and dry.         CRANIAL NERVES     CN III, IV, VI   Pupils are equal, round, and reactive to light.  Extraocular motions are normal.        Significant Labs: All pertinent labs within the past 24 hours have been reviewed.    Significant Imaging: I have reviewed all pertinent imaging results/findings within the past 24 hours.    Assessment/Plan:     * Gastrointestinal hemorrhage with hematemesis  Anemia of chronic disease and possibly superimposed acute GIB loss due to gastritis vs PUD vs esophagitis, vs MWT (in setting of vomiting).   No prior history of cancer or esophageal varices on multiple prior EGDs.   Previous EGD 9/23, LA Grade D reflux esophagitis. Medium-sized hiatal hernia. No active GI bleed.  Patient hemodynamically stable    PLAN:  - Continue PPI 40mg BID,  - Judicious fluids (caution due to ESRD). Received 500cc bolus x 2 in the ED, however continues to be tachycardiac HR>120; will give another 500 cc/2hrs later in the evening if HR >120, patient received dialysis today  - Type and cross, consent.   - Transfuse for Hg <7g/dl.   - Check Hg/HCT q8h.  - Maintain 2 large bore IVs.   - GI consulted  - NPO at midnight.         Gastroparesis    Holding  reglan   Resume tomorrow   Will discuss with GI; am     GERD with esophagitis  Continue PPI BID  H/o recurrent EGDs as noted above in HPI.   GI consulted      Chronic kidney disease-mineral and bone disorder    Continue renvela    History of Intracerebral Hemorrhage: L BG 5/2013; R BG 9/2016; R BG 11/2016; L caudate head 2/2017    Chronic, stable.   Continue to monitor.   Not currently on DAPT, or AC given h/o ICH/GIB.   PT/OT.    ESRD on hemodialysis  Consult nephrology for resumption of HD.   Dialyzes M/W/F.   completed session this morning prior to arrival.         Anemia in chronic kidney disease  Refer to GI bleed         VTE Risk Mitigation (From admission, onward)         Ordered     IP VTE HIGH RISK PATIENT  Once      09/30/19 1944     Place MEL hose  Until discontinued      09/30/19 1944     Place sequential compression device  Until discontinued      09/30/19 1944                   Dilip Abbott MD  Department of Hospital Medicine   [facility]

## 2019-10-01 NOTE — SUBJECTIVE & OBJECTIVE
Past Medical History:   Diagnosis Date    Amputation stump pain 9/10/2013    Aspiration pneumonia 7/27/2015    Asterixis 11/8/2016    C. difficile colitis 8/7/2015    Cholelithiasis without obstruction 8/25/2015    Chronic diastolic heart failure     2-23-17   1 - Low normal to mildly depressed left ventricular systolic function (EF 50-55%).    2 - Right ventricular enlargement with mildly depressed systolic function.    3 - Left ventricular diastolic dysfunction.    4 - Right atrial enlargement.    5 - Severe tricuspid regurgitation.    6 - Pulmonary hypertension. The estimated PA systolic pressure is 86 mmHg.    7 - Increased central venous pressure.    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    Metabolic acidosis, IAG, reduced excretion of inorganic acids     Myoclonic jerking 9/20/2016    Noncompliance with medication regimen 12/4/2018    Secondary hyperparathyroidism (of renal origin)     Secondary pulmonary hypertension 3/23/2017    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    TB lung, latent 08/25/2015    Negative Quantiferon Gold 3-23-17       Past Surgical History:   Procedure Laterality Date    COLONOSCOPY      COLONOSCOPY N/A 4/4/2017    Procedure: COLONOSCOPY;  Surgeon: Walker Stern MD;  Location: [facility] ([address] [place] [address]);  Service: Endoscopy;  Laterality: N/A;  PA Systolic Pressure 85.56. HD Patient MWF, K+ lab prior to procedure.      ESOPHAGOGASTRODUODENOSCOPY N/A 6/12/2018    Procedure: EGD (ESOPHAGOGASTRODUODENOSCOPY);  Surgeon: Man Galicia MD;  Location: [facility] (2ND FLR);  Service: Endoscopy;  Laterality: N/A;  EGD in 8-12 weeks with Dr. Galicia on 4th floor for follow up erosive esophagitis and Mejia's surveillance.    Patient should be on Pantoprazole 40mg every 12 hours or the equivulant of another PPI    awaiting for patient to reply back regarding changing    ESOPHAGOGASTRODUODENOSCOPY N/A 3/7/2019    Procedure: EGD (ESOPHAGOGASTRODUODENOSCOPY);  Surgeon: Man Galicia MD;  Location: [facility] (2ND FLR);  Service: Endoscopy;  Laterality: N/A;    ESOPHAGOGASTRODUODENOSCOPY N/A 9/23/2019    Procedure: EGD (ESOPHAGOGASTRODUODENOSCOPY);  Surgeon: Keanu Rainey MD;  Location: [facility] (2ND FLR);  Service: Endoscopy;  Laterality: N/A;    FOOT AMPUTATION THROUGH METATARSAL      left foot    LEG AMPUTATION THROUGH KNEE  12/18/2013    left BKA    R AVF  9/12/12    UPPER GASTROINTESTINAL ENDOSCOPY         Review of patient's allergies indicates:   Allergen Reactions    Fosrenol [lanthanum] Nausea And Vomiting     Nausea and vomiting     Family History     Problem Relation (Age of Onset)    Alcohol abuse Maternal Grandmother    Diabetes Brother, Maternal Grandfather    Early death Mother    Heart disease Father    Hyperlipidemia Father    Hypertension Father, Sister    Kidney disease Father        Tobacco Use    Smoking status: Former Smoker     Packs/day: 1.00     Years: 10.00     Pack years: 10.00    Smokeless tobacco: Never Used   Substance and Sexual Activity    Alcohol use: No    Drug use: No    Sexual activity: Yes     Partners: Female     Birth control/protection: None     Review of Systems   Constitutional: Negative for activity change, appetite change, chills, diaphoresis, fatigue, fever and unexpected weight change.   HENT: Negative for sore throat and trouble swallowing.    Eyes: Negative for visual  disturbance.   Respiratory: Negative for chest tightness and shortness of breath.    Cardiovascular: Negative for chest pain and leg swelling.   Gastrointestinal: Positive for nausea (denies but was present on HPI) and vomiting (denies but was present on HPI). Negative for abdominal distention, abdominal pain, anal bleeding, blood in stool, constipation and diarrhea.   Genitourinary: Negative for dysuria and hematuria.   Musculoskeletal: Negative for arthralgias and myalgias.   Skin: Negative for rash.   Neurological: Negative for dizziness, weakness, light-headedness and headaches.   Psychiatric/Behavioral: Negative for agitation and confusion.     Objective:     Vital Signs (Most Recent):  Temp: 98.9 °F (37.2 °C) (10/01/19 0445)  Pulse: 95 (10/01/19 0445)  Resp: 18 (10/01/19 0445)  BP: 124/68 (10/01/19 0445)  SpO2: 100 % (10/01/19 0445) Vital Signs (24h Range):  Temp:  [98.1 °F (36.7 °C)-99.4 °F (37.4 °C)] 98.9 °F (37.2 °C)  Pulse:  [] 95  Resp:  [12-18] 18  SpO2:  [98 %-100 %] 100 %  BP: (108-136)/(55-81) 124/68        There is no height or weight on file to calculate BMI.      Intake/Output Summary (Last 24 hours) at 10/1/2019 0702  Last data filed at 9/30/2019 2305  Gross per 24 hour   Intake 1600 ml   Output --   Net 1600 ml       Lines/Drains/Airways     Drain                 Hemodialysis AV Fistula Right upper arm -- days          Peripheral Intravenous Line                 Peripheral IV - Single Lumen 09/21/19 2130 24 G Anterior;Left Other 9 days         Peripheral IV - Single Lumen 09/30/19 20 G Left Hand 1 day                Physical Exam   Constitutional: He is oriented to person, place, and time. No distress.   Covering head with blanket, awake and alert but not interested in being interviewed or examined.   HENT:   Head: Normocephalic and atraumatic.   Mouth/Throat: Oropharynx is clear and moist. No oropharyngeal exudate.   Eyes: Conjunctivae are normal. No scleral icterus.   Neck: No JVD  present.   Cardiovascular: Normal rate, regular rhythm, normal heart sounds and intact distal pulses.   Pulmonary/Chest: Effort normal and breath sounds normal. No respiratory distress.   Abdominal: Soft. Bowel sounds are normal. He exhibits no distension and no mass. There is no tenderness. There is no rebound and no guarding.   Soft, non-tender abdomen.  Patient refuses ALAN.   Musculoskeletal: He exhibits no edema or tenderness.   Lymphadenopathy:     He has no cervical adenopathy.   Neurological: He is alert and oriented to person, place, and time.   Skin: Skin is warm. Capillary refill takes less than 2 seconds. He is not diaphoretic.   Psychiatric: He has a normal mood and affect. His behavior is normal. Judgment and thought content normal.   Nursing note and vitals reviewed.      Significant Labs:  All pertinent lab results from the last 24 hours have been reviewed.    Significant Imaging:  Imaging results within the past 24 hours have been reviewed.

## 2019-10-01 NOTE — MEDICAL/APP STUDENT
"[facility] Medicine  History & Physical    Patient Name: Vaughn Retana  MRN: [id number]  Admission Date: 9/30/2019  Attending Physician: Lainey Wheat MD  Primary Care Provider: Primary [facility] Medicine Team: [facility] MED 3 Titus Marie     Patient information was obtained from patient and chart    Subjective:     Principal Problem:Gastrointestinal hemorrhage with hematemesis    Chief Complaint:   Chief Complaint   Patient presents with    Emesis     from diaylsis recieved full tx. dark brown emesis. Hx of esphogeal varicies         HPI:  Mr Retana is a 55 yo, M with ESRD (HD on MWF), L below knee amputation 2/2 osteomyelitis, multiple ICH w/o motor or cognitive deficits and GERD who present to [facility] ED for Hematemesis. Pt reports vomiting dark red blood since 8am, approximately 2-3 times, not able to ascertain quantity. Also, reports he completed hemodialysis in the am despite vomiting blood. On presentation, pt denies chest pain, SOB, melena. In ED, pt received IV Reglan 80mg, 500cc bolus x 2, type and screen w/consent and GI evaluation.  Of note, pt HPI and ROS limited due to pt being uncooperative, history taken from chart.     Mr Retana has had multiple ED visits and hospitalizations for hematemesis, with 7 EGD procedures in the last 2 years. His last hospitalization was from 9/20 - 9/23 for hematemesis that resulted in an EGD and D/C. The EGD showed an "LA grade D reflux esophagitis, medium sized hiatal hernia and no GI bleed." On 9/29, he presented to [facility] ED for bloody emesis x 2, he was treated with IV Reglan and d/c'd after nausea and vomiting were controlled. He has been placed on a PPI 40 mg BID, Reglan TID before meals, and Carafate BID and reports being compliant with his medications, although unable to confirm with collarteral. Currently living in [facility] but needs home health and social assistance.     Past Medical History:   Diagnosis Date    " Amputation stump pain 9/10/2013    Aspiration pneumonia 7/27/2015    Asterixis 11/8/2016    C. difficile colitis 8/7/2015    Cholelithiasis without obstruction 8/25/2015    Chronic diastolic heart failure     2-23-17   1 - Low normal to mildly depressed left ventricular systolic function (EF 50-55%).    2 - Right ventricular enlargement with mildly depressed systolic function.    3 - Left ventricular diastolic dysfunction.    4 - Right atrial enlargement.    5 - Severe tricuspid regurgitation.    6 - Pulmonary hypertension. The estimated PA systolic pressure is 86 mmHg.    7 - Increased central venous pressure.    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    Metabolic acidosis, IAG, reduced excretion of inorganic acids     Myoclonic jerking 9/20/2016    Noncompliance with medication regimen 12/4/2018    Secondary hyperparathyroidism (of renal origin)     Secondary pulmonary hypertension 3/23/2017    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    TB lung, latent 08/25/2015    Negative Quantiferon Gold 3-23-17       Past Surgical History:   Procedure Laterality Date    COLONOSCOPY      COLONOSCOPY N/A 4/4/2017    Procedure: COLONOSCOPY;  Surgeon: Walker Stern MD;  Location: [facility] ([address] [place] [address]);  Service: Endoscopy;  Laterality: N/A;  PA Systolic Pressure 85.56. HD Patient MWF, K+ lab prior to procedure.     ESOPHAGOGASTRODUODENOSCOPY N/A 6/12/2018    Procedure: EGD  (ESOPHAGOGASTRODUODENOSCOPY);  Surgeon: Man Galicia MD;  Location: [facility] (2ND FLR);  Service: Endoscopy;  Laterality: N/A;  EGD in 8-12 weeks with Dr. Galicia on 4th floor for follow up erosive esophagitis and Mejia's surveillance.    Patient should be on Pantoprazole 40mg every 12 hours or the equivulant of another PPI    awaiting for patient to reply back regarding changing    ESOPHAGOGASTRODUODENOSCOPY N/A 3/7/2019    Procedure: EGD (ESOPHAGOGASTRODUODENOSCOPY);  Surgeon: Man Galicia MD;  Location: [facility] (2ND FLR);  Service: Endoscopy;  Laterality: N/A;    ESOPHAGOGASTRODUODENOSCOPY N/A 9/23/2019    Procedure: EGD (ESOPHAGOGASTRODUODENOSCOPY);  Surgeon: Keanu Rainey MD;  Location: [facility] (2ND FLR);  Service: Endoscopy;  Laterality: N/A;    FOOT AMPUTATION THROUGH METATARSAL      left foot    LEG AMPUTATION THROUGH KNEE  12/18/2013    left BKA    R AVF  9/12/12    UPPER GASTROINTESTINAL ENDOSCOPY         Review of patient's allergies indicates:   Allergen Reactions    Fosrenol [lanthanum] Nausea And Vomiting     Nausea and vomiting       No current facility-administered medications on file prior to encounter.      Current Outpatient Medications on File Prior to Encounter   Medication Sig    acetaminophen (TYLENOL) 325 MG tablet Take 2 tablets (650 mg total) by mouth every 8 (eight) hours as needed.    carvedilol (COREG) 3.125 MG tablet Take 1 tablet (3.125 mg total) by mouth 2 (two) times daily with meals.    metoclopramide HCl (REGLAN) 10 MG tablet Take 1 tablet (10 mg total) by mouth 3 (three) times daily before meals. can be titrated up as tolerated to maximum of 40 mg/day, with addition of evening dose as indicated,    midodrine (PROAMATINE) 5 MG Tab Please take 30 min before dialysis on Monday Wednesday and Friday    ondansetron (ZOFRAN) 8 MG tablet Take 1 tablet (8 mg total) by mouth every 12 (twelve) hours as needed for Nausea.    prochlorperazine (COMPAZINE) 10 MG  tablet Take 1 tablet (10 mg total) by mouth every 8 (eight) hours as needed (Nausea).    RENAPLEX-D 800 mcg-12.5 mg -2,000 unit Tab Take 1 tablet by mouth once daily.    sevelamer carbonate (RENVELA) 800 mg Tab Take 2 tablets (1,600 mg total) by mouth 3 (three) times daily with meals.    [DISCONTINUED] pantoprazole (PROTONIX) 40 MG tablet Take 1 tablet (40 mg total) by mouth 2 (two) times daily before meals.    [DISCONTINUED] sucralfate (CARAFATE) 100 mg/mL suspension Take 5 mLs (500 mg total) by mouth 2 (two) times daily.     Family History     Problem Relation (Age of Onset)    Alcohol abuse Maternal Grandmother    Diabetes Brother, Maternal Grandfather    Early death Mother    Heart disease Father    Hyperlipidemia Father    Hypertension Father, Sister    Kidney disease Father        Tobacco Use    Smoking status: Former Smoker     Packs/day: 1.00     Years: 10.00     Pack years: 10.00    Smokeless tobacco: Never Used   Substance and Sexual Activity    Alcohol use: No    Drug use: No    Sexual activity: Yes     Partners: Female     Birth control/protection: None     Review of Systems   Respiratory: Negative for shortness of breath.    Cardiovascular: Negative for chest pain.   Gastrointestinal: Positive for nausea and vomiting. Negative for abdominal pain and blood in stool.        Hematemesis, coffee-ground    ROS is limited as patient is unwilling to provide history and answer questions. ROS above taken from ED note.       Objective:     Vital Signs (Most Recent):  Temp: 97.8 °F (36.6 °C) (10/01/19 1310)  Pulse: 100 (10/01/19 1310)  Resp: 18 (10/01/19 1310)  BP: 104/64 (10/01/19 1310)  SpO2: 95 % (10/01/19 1310) Vital Signs (24h Range):  Temp:  [97.8 °F (36.6 °C)-99.4 °F (37.4 °C)] 97.8 °F (36.6 °C)  Pulse:  [] 100  Resp:  [12-18] 18  SpO2:  [95 %-100 %] 95 %  BP: (104-136)/(55-85) 104/64        There is no height or weight on file to calculate BMI.    Intake/Output Summary (Last 24 hours) at  10/1/2019 0702  Last data filed at 9/30/2019 2305      Gross per 24 hour   Intake 1600 ml   Output --   Net 1600 ml        Physical Exam   Constitutional: He is oriented to person, place, and time. He appears well-developed.   HENT:   Head: Normocephalic.   Eyes: Pupils are equal, round, and reactive to light. No scleral icterus.   Neck: No JVD present.   Cardiovascular: Normal rate, regular rhythm and normal heart sounds.   R AV Fistula, +Bruit, +Thrill   Pulmonary/Chest: Effort normal and breath sounds normal. No respiratory distress. He has no wheezes. He has no rales.   Abdominal: Soft. He exhibits no distension and no mass. There is no tenderness. There is no rebound and no guarding.   Bowel sounds are absent   Musculoskeletal: He exhibits no edema or tenderness.   Left BKA   Lymphadenopathy:     He has no cervical adenopathy.   Neurological: He is alert and oriented to person, place, and time.   Skin: Skin is warm and dry. Capillary refill takes less than 2 seconds.         Significant Labs:  Labs reviewed for the last 24 hrs.     Significant Imaging:   Imaging reviewed for the last 24 hrs.      Assessment/Plan:     55 yo, M with PMH of  ESRD, GERD, multiple ICH with CC of hematemesis likely due to Grade D Reflux esophagitis seen on 9/23. Differential includes: renzo-juan tear, esophageal cancer and esophageal varices.     Gastrointestinal hemorrhage with hematemesis    PLAN:  - Continue PPI 40mg BID   - Check Hg/HCT q8h  - GI consulted and does not want EGD unless new bleeding (thinks non-compliance is issue)  -Trial clears---soft----normal diet     Gastroparesis     Restart reglan      GERD with esophagitis  Continue PPI BID  See GI note above        Chronic kidney disease-mineral and bone disorder     Continue renvela     History of Intracerebral Hemorrhage: L BG 5/2013; R BG 9/2016; R BG 11/2016; L caudate head 2/2017     Chronic, stable.   Continue to monitor.   No AC given     ESRD on  hemodialysis  Consult nephrology for resumption of HD.   Dialyzes M/W/F.   Completed session this morning prior to arrival.       Anemia in chronic kidney disease  Refer to GI bleed      Social  -Cont to f/u with family for collateral  -Living in [facility]?  -Home Health order,       Titus Marie  Department of [facility] Medicine   [facility]

## 2019-10-01 NOTE — ASSESSMENT & PLAN NOTE
Consult nephrology for resumption of HD.   Dialyzes M/W/F.   completed session this morning prior to arrival.

## 2019-10-01 NOTE — PLAN OF CARE
CM at bedside to discuss discharge planning assessment.   Patient lives alone in an extended-stay motel.  Independent with ADL and does not use medical equipment.CM name and phone # written on board and explained CM services during patient's stay in hospital.   My Health packet discussed and left with patient.     10/01/19 2054   Discharge Assessment   Assessment Type Discharge Planning Assessment   Confirmed/corrected address and phone number on facesheet? Yes   Prior to hospitilization cognitive status: Alert/Oriented   Prior to hospitalization functional status: Independent;Assistive Equipment   Current cognitive status: Alert/Oriented   Current Functional Status: Assistive Equipment;Independent   Facility Arrived From: home   Lives With alone   Able to Return to Prior Arrangements yes   Is patient able to care for self after discharge? Unable to determine at this time (comments)   Who are your caregiver(s) and their phone number(s)? self   Patient's perception of discharge disposition other (comments)  (Extended Stay [facility])   Readmission Within the Last 30 Days previous discharge plan unsuccessful   If yes, most recent facility name: [facility]   Patient currently being followed by outpatient case management? No   Patient currently receives any other outside agency services? No   Equipment Currently Used at Home rollator   Do you have any problems affording any of your prescribed medications? No   Is the patient taking medications as prescribed? yes   Dialysis Name and Scheduled days [facility] MWF   Does the patient receive services at the [facility]? No   Discharge Plan A Home   Discharge Plan B Home;Home Health   DME Needed Upon Discharge    (TBD)   Patient/Family in Agreement with Plan yes   Readmission Questionnaire   At the time of your discharge, did someone talk to you about what your health problems were? Yes   At the time of discharge, did someone talk to you about what to watch out for regarding  worsening of your health problem? Yes   At the time of discharge, did someone talk to you about what to do if you experienced worsening of your health problem? Yes   At the time of discharge, did someone talk to you about which medication to take when you left the hospital and which ones to stop taking? Yes   At the time of discharge, did someone talk to you about when and where to follow up with a doctor after you left the hospital? Yes   How often do you need to have someone help you when you read instructions, pamphlets, or other written material from your doctor or pharmacy? Rarely   Do you have problems taking your medications as prescribed? No   Do you have any problems affording any of  your prescribed medications? No   Do you have problems obtaining/receiving your medications? No   Does the patient have transportation to healthcare appointments? Yes   Living Arrangements no permanent address   Are you currently feeling confused? No   Are you currently having problems thinking? No       [id number] - [place], [place] - [address]  [address]  [place] [address]  Phone: [phone] Fax: [phone]    [facility]  [address] [address]  Phone: [phone] Fax: [phone]    [id number] [place], [place] - [address]  [address] [facility] [address]  Phone: [phone] Fax: [phone]    Extended Emergency Contact Information  Primary Emergency Contact: Alicia Retana   [place]  Mobile Phone: [phone]  Relation: Sister  Secondary Emergency Contact: Daisy Longoria   [facility]  Home Phone: [phone]  Mobile Phone: [phone]  Relation: Relative

## 2019-10-01 NOTE — ASSESSMENT & PLAN NOTE
Anemia of chronic disease and possibly superimposed acute GIB loss due to gastritis vs PUD vs esophagitis, vs MWT (in setting of vomiting).   No prior history of cancer or esophageal varices on multiple prior EGDs.   Previous EGD 9/23, LA Grade D reflux esophagitis. Medium-sized hiatal hernia. No active GI bleed.  Patient hemodynamically stable    PLAN:  - Continue PPI 40mg BID,  - Judicious fluids (caution due to ESRD). Received 500cc bolus x 2 in the ED, however continues to be tachycardiac HR>120; will give another 500 cc/2hrs later in the evening if HR >120, patient received dialysis today  - Type and cross, consent.   - Transfuse for Hg <7g/dl.   - Check Hg/HCT q8h.  - Maintain 2 large bore IVs.   - GI consulted  - NPO at midnight.

## 2019-10-01 NOTE — HPI
Mr. Retana is a 53 yo AAM with ESRD on dialysis MWF, L below knee amputation 2/2 osteomyelitis, dCHF, GERD, h/o multiple ICH w/o residual deficits, who presented to [facility] ED with primary complaint of hematemesis. Patient is a poor historian and has limited knowledge of his medical history. During the interview the patient would not cooperate and provide much/any history. History is via ED physician and chart review.    Patient presenting to ED with a c/c of hematemesis x 2 episodes. Of note patient was evaluated in the ED yesterday (9/29) for a similar compliant. During that visit he was given IV reglan and similiarly failed to elaporate on his chief complaint and was discharged. Prior to this visit, patient was admitted to [facility] (9/20-9/23) for hematemesis, he received a total 2U PRBC and underwent endoscopy on 9/23 which showed some evidence of erosive esophagitis but no active or ongoing bleeding. At time of discharge Hg noted to be 10.2 (9/23) and today 8.7 (9/30).    Per chart review patient he was seen in the ED yesterday, and vomiting resolved with 1 dose of IM Reglan. He returned today (9/30) with persistent coffee ground emesis. He denies blood in his stool. Also denies hemoptysis. He is minimally responsive when asked about his symptoms and ROS. Last dialyzed today (M/W/F HD schedule).     In the ED patient is hemodynamically stable. PPI 80mg IV, 500cc bolus x 2 doses, type and screen, blood consent and evaluated by GI.       PREVIOUS HISTORY:     The patient was discharged from the hospital on 9/6/19 for similar symptoms of abdominal pain with intractable nausea and vomiting and was evaluated by GI at that time as well. However, a repeat EGD was not done due to the patient being seen a [facility] earlier in August and having had an EGD done on 8/2 showing grade A esophagitis. A NM gastric emptying study was done during the last admission that was suggestive of gastroparesis retaining 50% of his gastric contents  4 hours after meals. He was placed on a PPI 40 mg BID, Reglan TID before meals, and Carafate BID and reports being compliant with his medications.     The following is a break down of his GI procedure history:    9/23/19- EGD [facility] - LA Grade D reflux esophagitis. Medium-sized hiatal hernia. No active GI bleed.    8/22/19 - EGD [facility] - LA grade A reflux esophagitis with no active bleeding     3/7/2019 - EGD [facility] - LA Grade D reflux esophagitis involving the entire esophagus  with a 2 cm sliding hiatal hernia. Congestive gastropathy, erythematous doudenopathy. No specimens collected     5/22/2018 - EGD [facility] - LA grade D reflux esophagitis with small hiatal hernia. Normal stomach and duodenum. No specimens collected     3/16/2018 - EGD [facility] - LA grade C reflux esophagitis with non-bleeding gastric ulcer with a clean base and a 4 cm hiatal hernia. 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 Normal duodenum     3/8/2018 - EGD [facility] - LA Grade B reflux esophagitis with a 3 cm hiatal hernia. Gastric polyp no biopsies. Normal duodenum     4/4/2017 - EGD [facility] -  LA Grade D reflux esophagitis with a small hiatal hernia. Normal stomach and duodenum     4/4/2017 - Colonoscopy [facility] - 3 mm polyp removed from the transverse colon pathology adenomatous. 8 mm polyp found in the sigmoid colon removed Pathology adenovillous. Suggestion to repeat in 3 years

## 2019-10-01 NOTE — TREATMENT PLAN
GI Treatment Plan    Vaughn Retana is a 54 y.o. male admitted to hospital 9/30/2019 (Hospital Day: 1) due to <principal problem not specified>.     Interval History  - called by ED due to patent's presentation  - recent admitted due to coffee grind emesis with EGD (9/23) notable for Gr D esophagitis, otherwise no acute findings  - returned to hospital with persistent vomitus  - hemodynamically stable  - H&H declined 10.2 (9/23) -> 8.7.     Laboratory    Recent Labs   Lab 09/30/19  1711   HGB 8.7*       Lab Results   Component Value Date    WBC 10.94 09/30/2019    HGB 8.7 (L) 09/30/2019    HCT 26.4 (L) 09/30/2019     (H) 09/30/2019     09/30/2019       Lab Results   Component Value Date     09/30/2019    K 3.4 (L) 09/30/2019     09/30/2019    CO2 27 09/30/2019    BUN 15 09/30/2019    CREATININE 3.6 (H) 09/30/2019    CALCIUM 7.9 (L) 09/30/2019    ANIONGAP 11 09/30/2019    ESTGFRAFRICA 20.7 (A) 09/30/2019    EGFRNONAA 17.9 (A) 09/30/2019       Lab Results   Component Value Date    ALT 6 (L) 09/30/2019    AST 17 09/30/2019    ALKPHOS 141 (H) 09/30/2019    BILITOT 0.2 09/30/2019       Lab Results   Component Value Date    INR 1.1 09/30/2019    INR 1.0 09/21/2019    INR 1.1 09/20/2019       Plan  - Likely ongoing bleeding due to recently noted Gr D esophagitis. No ulcers noted in recent examination and no biopsies and reportedly no nsaids, therefore unlikely PUD. No reported retching to indicate renzo juan.  - iv resuscitation, transfuse Hgb > 7 or ongoing bleeding  - protonix 40mg BID  - trend H&H  - NPO  - call if any changes in patient's clinical status  - Plan of care was discussed with primary team.  - We will continue to follow.    Thank you for involving us in the care of Vaughn Retana. Please call with any additional questions, concerns or changes in the patient's clinical status.    Yimi Jacques MD  Gastroenterology Fellow  Spectralink: [id number]

## 2019-10-01 NOTE — HOSPITAL COURSE
Patient admitted for evaluation of his anemia and hematemesis.  GI consulted agrees with conservative IV PPI plan.  They recently scoped him on 9/23 which showed a grade D esophagitis. Continue patient's PPI.  H/H dropped from 8.7 to 7.0 today.  NPO @ midnight with EGD scheduled for tomorrow morning.  Overnight, patient's Hgb continued to drop to 6.2, ordered 1x u pRBC.  EGD following morning was unrevealing for source of GI bleed. His hemoglobin has stabilized around 8~.  Plan for discharge to nursing home for further medication management.  Prior to DC patient w/ reported coffee ground emesis. Subsequent AM Hgb dropped from 7.2 to 6.1. Patient transfused 2U pRBCs resulting to a level of 9.2. Patient to follow up in the GI clinic for f/u EGD in 8-12 weeks.     Vitals:    10/08/19 1300   BP: (!) 148/75   Pulse: 90   Resp: 16   Temp: 97.8 °F (36.6 °C)     Constitutional: He is oriented to person, place, and time. He appears well-developed and well-nourished.   Patient is lying bed, appears alert, and in no acute distress.    HENT:   Head: Normocephalic and atraumatic.   Eyes: Pupils are equal, round, and reactive to light. EOM are normal.   Neck: Normal range of motion. No thyromegaly present.   Cardiovascular: Normal rate, regular rhythm and intact distal pulses.   Pulmonary/Chest: Effort normal and breath sounds normal. No respiratory distress.   Abdominal: Soft. Bowel sounds are normal. He exhibits no distension. There is no tenderness.   Musculoskeletal: Normal range of motion.   L BKA    Neurological: He is alert and oriented to person, place, and time.   Skin: Skin is warm and dry.   Psychiatric: He has a normal mood and affect. His behavior is normal.   Vitals reviewed.

## 2019-10-01 NOTE — ASSESSMENT & PLAN NOTE
Anemia of chronic disease and possibly superimposed acute GIB loss due to gastritis vs PUD vs esophagitis, vs MWT (in setting of vomiting).   No prior history of cancer or esophageal varices on multiple prior EGDs.   Previous EGD 9/23, LA Grade D reflux esophagitis. Medium-sized hiatal hernia. No active GI bleed.  Patient hemodynamically stable    PLAN:  - Continue PPI 40mg BID,  - Judicious fluids (caution due to ESRD). Received 500cc bolus x 2 in the ED, however continues to be tachycardiac HR>120; will give another 500 cc/2hrs later in the evening if HR >120, patient received dialysis today  - Type and cross, consent.   - Transfuse for Hg <7g/dl.   - Check Hg/HCT q8h.  - Maintain 2 large bore IVs.   - GI consulted: they feel patient likely bleeding from known esophagitis, noted on endoscope on 9/23.  Recommend continue PPI.

## 2019-10-01 NOTE — ASSESSMENT & PLAN NOTE
Chronic, stable.   Continue to monitor.   Not currently on DAPT, or AC given h/o ICH/GIB.   PT/OT.

## 2019-10-01 NOTE — ASSESSMENT & PLAN NOTE
Mr Retana is a 54 year old man with history of ESRD, esophagitis, HFpEF, ICH, osteomyelitis, chronic opiates, with multiple recent hospitalizations including 9/20 - 9/23 with hematemesis secondary to esophagitis, who is presenting to the hospital with coffee-grind emesis.    Multiple prior EGD notable for esophagitis, most recently 9/23 with Gr D esophagitis, now returning to hospital with reported coffee-grind emesis and decline in H&H 10 -> 8. Likely ongoing bleeding is secondary to known esophagitis. Unclear if patient has been taking his medications.    Plan  - given recent EGD notable for esophagitis and clinically stable will hold on repeat endoscopic evaluation at this time  - protonix 40mg BID  - carafate 1g QID  - please call pharmacy to determine if patient has filled his prescription of PPI/Carafate as will help to further management  - trend H&H  - ok for clear liquid diet, can advance tomorrow if remains stable  - recommend consideration of inpatient iron transfusion  - call if any changes in patient's clinical status

## 2019-10-01 NOTE — HPI
Mr Retana is a 54 year old man with history of ESRD, esophagitis, HFpEF, ICH, osteomyelitis, chronic opiates, with multiple recent hospitalizations including 9/20 - 9/23 with hematemesis secondary to esophagitis, who is presenting to the hospital with coffee-grind emesis.    He was recently hospitalized 9/20 - 9/23 with concern for UGIB. Underwent EGD which was notable for moderate hiatal hernia and Gr D esophagitis. Was discharged on BID PPI and carafate.    Returned to the ED on 9/29 with nausea and vomiting (non-bloody) which resolved with anti-imetic therapy. No labs available. He returned to the hospital on 9/30 with concern for coffee-grind emesis. Labs were notable for decline in H&H from 10.2 -> 8.7 therefore he was admitted for further management. Patient not talkative and providing only limited history with predominantly yes/no answers. Reviewed HPI with patient but he does not elaborate on reason for presenting to the hospital. When asked about bleeding he denies. Denies any nausea vomitus or abdominal pain currently. Denies any melena. Is unclear about whether he has been taking his PPI/carafate since discharge (comments that he has been taking his medications but then says he has not been taking this medication).    PEndoHx  9/23/19 EGD Gr D esophagitis, medium hiatal hernia  3/7/19 EGD Gr D esophagitis  5/22/18 EGD LA Gr D esophagitis  3/16/18 EGD gastric polyp. Gr C esophagitis  4/4/17 Colonoscopy. Fair prep. 3mm transverse polyp, 8mm sigmoid polyp. F/u 3 years.

## 2019-10-01 NOTE — SUBJECTIVE & OBJECTIVE
Past Medical History:   Diagnosis Date    Amputation stump pain 9/10/2013    Aspiration pneumonia 7/27/2015    Asterixis 11/8/2016    C. difficile colitis 8/7/2015    Cholelithiasis without obstruction 8/25/2015    Chronic diastolic heart failure     2-23-17   1 - Low normal to mildly depressed left ventricular systolic function (EF 50-55%).    2 - Right ventricular enlargement with mildly depressed systolic function.    3 - Left ventricular diastolic dysfunction.    4 - Right atrial enlargement.    5 - Severe tricuspid regurgitation.    6 - Pulmonary hypertension. The estimated PA systolic pressure is 86 mmHg.    7 - Increased central venous pressure.    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    Metabolic acidosis, IAG, reduced excretion of inorganic acids     Myoclonic jerking 9/20/2016    Noncompliance with medication regimen 12/4/2018    Secondary hyperparathyroidism (of renal origin)     Secondary pulmonary hypertension 3/23/2017    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    TB lung, latent 08/25/2015    Negative Quantiferon Gold 3-23-17       Past Surgical History:   Procedure Laterality Date    COLONOSCOPY      COLONOSCOPY N/A 4/4/2017    Procedure: COLONOSCOPY;  Surgeon: Walker Stern MD;  Location: [facility] ([address] [place] [address]);  Service: Endoscopy;  Laterality: N/A;  PA Systolic Pressure 85.56. HD Patient MWF, K+ lab prior to procedure.      ESOPHAGOGASTRODUODENOSCOPY N/A 6/12/2018    Procedure: EGD (ESOPHAGOGASTRODUODENOSCOPY);  Surgeon: Man Galicia MD;  Location: [facility] (2ND FLR);  Service: Endoscopy;  Laterality: N/A;  EGD in 8-12 weeks with Dr. Galicia on 4th floor for follow up erosive esophagitis and Mejia's surveillance.    Patient should be on Pantoprazole 40mg every 12 hours or the equivulant of another PPI    awaiting for patient to reply back regarding changing    ESOPHAGOGASTRODUODENOSCOPY N/A 3/7/2019    Procedure: EGD (ESOPHAGOGASTRODUODENOSCOPY);  Surgeon: Man Galicia MD;  Location: [facility] (2ND FLR);  Service: Endoscopy;  Laterality: N/A;    ESOPHAGOGASTRODUODENOSCOPY N/A 9/23/2019    Procedure: EGD (ESOPHAGOGASTRODUODENOSCOPY);  Surgeon: Keanu Rainey MD;  Location: [facility] (2ND FLR);  Service: Endoscopy;  Laterality: N/A;    FOOT AMPUTATION THROUGH METATARSAL      left foot    LEG AMPUTATION THROUGH KNEE  12/18/2013    left BKA    R AVF  9/12/12    UPPER GASTROINTESTINAL ENDOSCOPY         Review of patient's allergies indicates:   Allergen Reactions    Fosrenol [lanthanum] Nausea And Vomiting     Nausea and vomiting       Current Facility-Administered Medications on File Prior to Encounter   Medication    [COMPLETED] metoclopramide HCl injection 10 mg     Current Outpatient Medications on File Prior to Encounter   Medication Sig    acetaminophen (TYLENOL) 325 MG tablet Take 2 tablets (650 mg total) by mouth every 8 (eight) hours as needed.    carvedilol (COREG) 3.125 MG tablet Take 1 tablet (3.125 mg total) by mouth 2 (two) times daily with meals.    metoclopramide HCl (REGLAN) 10 MG tablet Take 1 tablet (10 mg total) by mouth 3 (three) times daily before meals. can be titrated up as tolerated to maximum of 40 mg/day, with addition of evening dose as indicated,    midodrine (PROAMATINE) 5 MG Tab Please take 30 min before dialysis on Monday Wednesday and Friday    ondansetron (ZOFRAN) 8 MG tablet Take 1  tablet (8 mg total) by mouth every 12 (twelve) hours as needed for Nausea.    pantoprazole (PROTONIX) 40 MG tablet Take 1 tablet (40 mg total) by mouth 2 (two) times daily before meals.    prochlorperazine (COMPAZINE) 10 MG tablet Take 1 tablet (10 mg total) by mouth every 8 (eight) hours as needed (Nausea).    RENAPLEX-D 800 mcg-12.5 mg -2,000 unit Tab Take 1 tablet by mouth once daily.    sevelamer carbonate (RENVELA) 800 mg Tab Take 2 tablets (1,600 mg total) by mouth 3 (three) times daily with meals.    sucralfate (CARAFATE) 100 mg/mL suspension Take 5 mLs (500 mg total) by mouth 2 (two) times daily.     Family History     Problem Relation (Age of Onset)    Alcohol abuse Maternal Grandmother    Diabetes Brother, Maternal Grandfather    Early death Mother    Heart disease Father    Hyperlipidemia Father    Hypertension Father, Sister    Kidney disease Father        Tobacco Use    Smoking status: Former Smoker     Packs/day: 1.00     Years: 10.00     Pack years: 10.00    Smokeless tobacco: Never Used   Substance and Sexual Activity    Alcohol use: No    Drug use: No    Sexual activity: Yes     Partners: Female     Birth control/protection: None     Review of Systems   Unable to perform ROS: Other (patient not cooperative during exam, )   Constitutional: Negative for chills and fever.   Respiratory: Negative for chest tightness and shortness of breath.    Gastrointestinal: Positive for vomiting. Negative for blood in stool, constipation and diarrhea.        Hematemesis      Objective:     Vital Signs (Most Recent):  Temp: 99.3 °F (37.4 °C) (09/30/19 1930)  Pulse: (!) 112 (09/30/19 1930)  Resp: 16 (09/30/19 1930)  BP: (!) 111/57 (09/30/19 1930)  SpO2: 100 % (09/30/19 1930) Vital Signs (24h Range):  Temp:  [98.1 °F (36.7 °C)-99.3 °F (37.4 °C)] 99.3 °F (37.4 °C)  Pulse:  [107-118] 112  Resp:  [14-18] 16  SpO2:  [99 %-100 %] 100 %  BP: (108-127)/(55-85) 111/57        There is no height or weight on file to  calculate BMI.    Physical Exam   Constitutional: He is uncooperative. He does not appear ill. No distress.   HENT:   Head: Normocephalic and atraumatic.   Eyes: Pupils are equal, round, and reactive to light. EOM are normal.   Neck: Normal range of motion. Neck supple.   Cardiovascular: Regular rhythm. Tachycardia present.   No murmur heard.  Pulmonary/Chest: Effort normal and breath sounds normal. No respiratory distress.   Abdominal: Soft. He exhibits no distension. There is no tenderness. There is no guarding.   Musculoskeletal: He exhibits deformity. He exhibits no tenderness.        Arms:  Left BKA noted   Prosthetic leg    Neurological: He is alert.   Skin: Skin is warm and dry.         CRANIAL NERVES     CN III, IV, VI   Pupils are equal, round, and reactive to light.  Extraocular motions are normal.        Significant Labs: All pertinent labs within the past 24 hours have been reviewed.    Significant Imaging: I have reviewed all pertinent imaging results/findings within the past 24 hours.

## 2019-10-01 NOTE — CONSULTS
[facility]  Gastroenterology  Consult Note    Patient Name: Vaughn Retana  MRN: [id number]  Admission Date: 9/30/2019  Hospital Length of Stay: 1 days  Code Status: Prior   Attending Provider: Lainey Wheat MD   Consulting Provider: Yimi Jacques MD  Primary Care Physician: Primary Doctor No  Principal Problem:Gastrointestinal hemorrhage with hematemesis    Inpatient consult to Gastroenterology  Consult performed by: Yimi Jacques MD  Consult ordered by: Kaylee Sandoval MD        Subjective:     HPI:  Mr Retana is a 54 year old man with history of ESRD, esophagitis, HFpEF, ICH, osteomyelitis, chronic opiates, with multiple recent hospitalizations including 9/20 - 9/23 with hematemesis secondary to esophagitis, who is presenting to the hospital with coffee-grind emesis.    He was recently hospitalized 9/20 - 9/23 with concern for UGIB. Underwent EGD which was notable for moderate hiatal hernia and Gr D esophagitis. Was discharged on BID PPI and carafate.    Returned to the ED on 9/29 with nausea and vomiting (non-bloody) which resolved with anti-imetic therapy. No labs available. He returned to the hospital on 9/30 with concern for coffee-grind emesis. Labs were notable for decline in H&H from 10.2 -> 8.7 therefore he was admitted for further management. Patient not talkative and providing only limited history with predominantly yes/no answers. Reviewed HPI with patient but he does not elaborate on reason for presenting to the hospital. When asked about bleeding he denies. Denies any nausea vomitus or abdominal pain currently. Denies any melena. Is unclear about whether he has been taking his PPI/carafate since discharge (comments that he has been taking his medications but then says he has not been taking this medication).    PEndoHx  9/23/19 EGD Gr D esophagitis, medium hiatal hernia  3/7/19 EGD Gr D esophagitis  5/22/18 EGD LA Gr D esophagitis  3/16/18 EGD gastric polyp. Gr C  esophagitis  4/4/17 Colonoscopy. Fair prep. 3mm transverse polyp, 8mm sigmoid polyp. F/u 3 years.      Past Medical History:   Diagnosis Date    Amputation stump pain 9/10/2013    Aspiration pneumonia 7/27/2015    Asterixis 11/8/2016    C. difficile colitis 8/7/2015    Cholelithiasis without obstruction 8/25/2015    Chronic diastolic heart failure     2-23-17   1 - Low normal to mildly depressed left ventricular systolic function (EF 50-55%).    2 - Right ventricular enlargement with mildly depressed systolic function.    3 - Left ventricular diastolic dysfunction.    4 - Right atrial enlargement.    5 - Severe tricuspid regurgitation.    6 - Pulmonary hypertension. The estimated PA systolic pressure is 86 mmHg.    7 - Increased central venous pressure.    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    Metabolic acidosis, IAG, reduced excretion of inorganic acids     Myoclonic jerking 9/20/2016    Noncompliance with medication regimen 12/4/2018    Secondary hyperparathyroidism (of renal origin)     Secondary pulmonary hypertension 3/23/2017    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    TB lung, latent 08/25/2015    Negative Quantiferon Gold 3-23-17       Past Surgical History:   Procedure Laterality Date    COLONOSCOPY      COLONOSCOPY N/A 4/4/2017    Procedure: COLONOSCOPY;  Surgeon: Walker Stern MD;  Location: [facility] ([address] [place] [address]);  Service: Endoscopy;  Laterality:  N/A;  PA Systolic Pressure 85.56. HD Patient MWF, K+ lab prior to procedure.     ESOPHAGOGASTRODUODENOSCOPY N/A 6/12/2018    Procedure: EGD (ESOPHAGOGASTRODUODENOSCOPY);  Surgeon: Man Galicia MD;  [facility]  Service: Endoscopy;  Laterality: N/A;  EGD in 8-12 weeks with Dr. Galicia on 4th floor for follow up erosive esophagitis and Mejia's surveillance.    Patient should be on Pantoprazole 40mg every 12 hours or the equivulant of another PPI    awaiting for patient to reply back regarding changing    ESOPHAGOGASTRODUODENOSCOPY N/A 3/7/2019    Procedure: EGD (ESOPHAGOGASTRODUODENOSCOPY);  Surgeon: Man Galicia MD;  [facility]  Service: Endoscopy;  Laterality: N/A;    ESOPHAGOGASTRODUODENOSCOPY N/A 9/23/2019    Procedure: EGD (ESOPHAGOGASTRODUODENOSCOPY);  Surgeon: Keanu Rainey MD;  Location: [facility] ([address] [place] [address]);  Service: Endoscopy;  Laterality: N/A;    FOOT AMPUTATION THROUGH METATARSAL      left foot    LEG AMPUTATION THROUGH KNEE  12/18/2013    left BKA    R AVF  9/12/12    UPPER GASTROINTESTINAL ENDOSCOPY         Review of patient's allergies indicates:   Allergen Reactions    Fosrenol [lanthanum] Nausea And Vomiting     Nausea and vomiting     Family History     Problem Relation (Age of Onset)    Alcohol abuse Maternal Grandmother    Diabetes Brother, Maternal Grandfather    Early death Mother    Heart disease Father    Hyperlipidemia Father    Hypertension Father, Sister    Kidney disease Father        Tobacco Use    Smoking status: Former Smoker     Packs/day: 1.00     Years: 10.00     Pack years: 10.00    Smokeless tobacco: Never Used   Substance and Sexual Activity    Alcohol use: No    Drug use: No    Sexual activity: Yes     Partners: Female     Birth control/protection: None     Review of Systems   Constitutional: Negative for activity change, appetite change, chills, diaphoresis, fatigue, fever and unexpected weight change.   HENT:  Negative for sore throat and trouble swallowing.    Eyes: Negative for visual disturbance.   Respiratory: Negative for chest tightness and shortness of breath.    Cardiovascular: Negative for chest pain and leg swelling.   Gastrointestinal: Positive for nausea (denies but was present on HPI) and vomiting (denies but was present on HPI). Negative for abdominal distention, abdominal pain, anal bleeding, blood in stool, constipation and diarrhea.   Genitourinary: Negative for dysuria and hematuria.   Musculoskeletal: Negative for arthralgias and myalgias.   Skin: Negative for rash.   Neurological: Negative for dizziness, weakness, light-headedness and headaches.   Psychiatric/Behavioral: Negative for agitation and confusion.     Objective:     Vital Signs (Most Recent):  Temp: 98.9 °F (37.2 °C) (10/01/19 0445)  Pulse: 95 (10/01/19 0445)  Resp: 18 (10/01/19 0445)  BP: 124/68 (10/01/19 0445)  SpO2: 100 % (10/01/19 0445) Vital Signs (24h Range):  Temp:  [98.1 °F (36.7 °C)-99.4 °F (37.4 °C)] 98.9 °F (37.2 °C)  Pulse:  [] 95  Resp:  [12-18] 18  SpO2:  [98 %-100 %] 100 %  BP: (108-136)/(55-81) 124/68        There is no height or weight on file to calculate BMI.      Intake/Output Summary (Last 24 hours) at 10/1/2019 0702  Last data filed at 9/30/2019 2305  Gross per 24 hour   Intake 1600 ml   Output --   Net 1600 ml       Lines/Drains/Airways     Drain                 Hemodialysis AV Fistula Right upper arm -- days          Peripheral Intravenous Line                 Peripheral IV - Single Lumen 09/21/19 2130 24 G Anterior;Left Other 9 days         Peripheral IV - Single Lumen 09/30/19 20 G Left Hand 1 day                Physical Exam   Constitutional: He is oriented to person, place, and time. No distress.   Covering head with blanket, awake and alert but not interested in being interviewed or examined.   HENT:   Head: Normocephalic and atraumatic.   Mouth/Throat: Oropharynx is clear and moist. No oropharyngeal  exudate.   Eyes: Conjunctivae are normal. No scleral icterus.   Neck: No JVD present.   Cardiovascular: Normal rate, regular rhythm, normal heart sounds and intact distal pulses.   Pulmonary/Chest: Effort normal and breath sounds normal. No respiratory distress.   Abdominal: Soft. Bowel sounds are normal. He exhibits no distension and no mass. There is no tenderness. There is no rebound and no guarding.   Soft, non-tender abdomen.  Patient refuses ALAN.   Musculoskeletal: He exhibits no edema or tenderness.   Lymphadenopathy:     He has no cervical adenopathy.   Neurological: He is alert and oriented to person, place, and time.   Skin: Skin is warm. Capillary refill takes less than 2 seconds. He is not diaphoretic.   Psychiatric: He has a normal mood and affect. His behavior is normal. Judgment and thought content normal.   Nursing note and vitals reviewed.      Significant Labs:  All pertinent lab results from the last 24 hours have been reviewed.    Significant Imaging:  Imaging results within the past 24 hours have been reviewed.    Assessment/Plan:     * Gastrointestinal hemorrhage with hematemesis  Mr Retana is a 54 year old man with history of ESRD, esophagitis, HFpEF, ICH, osteomyelitis, chronic opiates, with multiple recent hospitalizations including 9/20 - 9/23 with hematemesis secondary to esophagitis, who is presenting to the hospital with coffee-grind emesis.    Multiple prior EGD notable for esophagitis, most recently 9/23 with Gr D esophagitis, now returning to hospital with reported coffee-grind emesis and decline in H&H 10 -> 8. Likely ongoing bleeding is secondary to known esophagitis. Unclear if patient has been taking his medications.    Plan  - given recent EGD notable for esophagitis and clinically stable will hold on repeat endoscopic evaluation at this time  - protonix 40mg BID  - carafate 1g QID  - please call pharmacy to determine if patient has filled his prescription of PPI/Carafate as  will help to further management  - trend H&H  - ok for clear liquid diet, can advance tomorrow if remains stable  - recommend consideration of inpatient iron transfusion  - call if any changes in patient's clinical status        Thank you for your consult. I will follow-up with patient. Please contact us if you have any additional questions.    Yimi Jacques MD  Gastroenterology  [facility]

## 2019-10-01 NOTE — ED PROVIDER NOTES
Encounter Date: 9/30/2019       History     Chief Complaint   Patient presents with    Emesis     from diaylsis recieved full tx. dark brown emesis. Hx of esphogeal varicies      Mr. Retana is a 53 yo AAM with ESRD on dialysis MWF, L below knee amputation 2/2 osteomyelitis, dCHF, GERD/esophagitis with hx multiple GIB, h/o multiple ICH w/o residual deficits presents to the [facility] [place] on 9/30/18 with c/o persistent emesis x2 days. He was seen in the ED yesterday, and vomiting resolved with 1 dose of IM Reglan. He returned today with persistent coffee ground emesis. He denies blood in his stool. Also denies hemoptysis. He is minimally responsive when asked about his symptoms and ROS. Last dialyzed today (M/W/F HD schedule).         Review of patient's allergies indicates:   Allergen Reactions    Fosrenol [lanthanum] Nausea And Vomiting     Nausea and vomiting     Past Medical History:   Diagnosis Date    Amputation stump pain 9/10/2013    Aspiration pneumonia 7/27/2015    Asterixis 11/8/2016    C. difficile colitis 8/7/2015    Cholelithiasis without obstruction 8/25/2015    Chronic diastolic heart failure     2-23-17   1 - Low normal to mildly depressed left ventricular systolic function (EF 50-55%).    2 - Right ventricular enlargement with mildly depressed systolic function.    3 - Left ventricular diastolic dysfunction.    4 - Right atrial enlargement.    5 - Severe tricuspid regurgitation.    6 - Pulmonary hypertension. The estimated PA systolic pressure is 86 mmHg.    7 - Increased central venous pressure.    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 8/1/2015    Metabolic acidosis, IAG, reduced excretion of inorganic acids     Myoclonic jerking 9/20/2016    Noncompliance with medication regimen 12/4/2018    Secondary hyperparathyroidism (of renal origin)     Secondary pulmonary hypertension 3/23/2017    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    TB lung, latent 08/25/2015    Negative Quantiferon Gold 3-23-17     Past Surgical History:   Procedure Laterality Date    COLONOSCOPY      COLONOSCOPY N/A 4/4/2017    Procedure: COLONOSCOPY;  Surgeon: Walker Stern MD;  Location: [facility] (2ND FLR);  Service: Endoscopy;  Laterality: N/A;  PA Systolic Pressure 85.56. HD Patient MWF, K+ lab prior to procedure.     ESOPHAGOGASTRODUODENOSCOPY N/A 6/12/2018    Procedure: EGD (ESOPHAGOGASTRODUODENOSCOPY);  Surgeon: Man Galicia MD;  Location: [facility] (2ND FLR);  Service: Endoscopy;  Laterality: N/A;  EGD in 8-12 weeks with Dr. Galicia on 4th floor for follow up erosive esophagitis and Mejia's surveillance.    Patient should be on Pantoprazole 40mg every 12 hours or the equivulant of another PPI    awaiting for patient to reply back regarding changing    ESOPHAGOGASTRODUODENOSCOPY N/A 3/7/2019    Procedure: EGD (ESOPHAGOGASTRODUODENOSCOPY);  Surgeon: Man Galicia MD;  Location: [facility] (2ND FLR);  Service: Endoscopy;  Laterality: N/A;    ESOPHAGOGASTRODUODENOSCOPY N/A 9/23/2019    Procedure: EGD (ESOPHAGOGASTRODUODENOSCOPY);  Surgeon: Keanu Rainey MD;  Location: [facility] (2ND FLR);  Service: Endoscopy;  Laterality: N/A;    FOOT AMPUTATION THROUGH METATARSAL      left foot    LEG AMPUTATION THROUGH KNEE  12/18/2013    left BKA    R AVF  9/12/12    UPPER GASTROINTESTINAL ENDOSCOPY       Family History   Problem Relation Age of Onset    Early death Mother     Kidney disease  Father     Hypertension Father     Heart disease Father     Hyperlipidemia Father     Diabetes Brother     Alcohol abuse Maternal Grandmother     Diabetes Maternal Grandfather     Hypertension Sister     Melanoma Neg Hx      Social History     Tobacco Use    Smoking status: Former Smoker     Packs/day: 1.00     Years: 10.00     Pack years: 10.00    Smokeless tobacco: Never Used   Substance Use Topics    Alcohol use: No    Drug use: No     Review of Systems   Unable to perform ROS: Other   Pt would not answer most questions about symptoms or ROS, even when asked repeatedly.    Physical Exam     Initial Vitals [09/30/19 1551]   BP Pulse Resp Temp SpO2   120/79 (!) 118 15 98.1 °F (36.7 °C) 99 %      MAP       --         Physical Exam  Nursing note and vitals reviewed.  Constitutional:  Appears ill, actively vomiting, coffee-ground emesis.   HENT:   Head: Normocephalic and atraumatic.   Eyes: Conjunctivae and EOM are normal. Pupils are equal, round, and reactive to light.   Neck: Normal range of motion. Neck supple. No tracheal deviation present.   Cardiovascular: Tachycardic, regular rhythm, normal heart sounds and intact distal pulses.   Pulmonary/Chest: Breath sounds normal. No respiratory distress. He has no wheezes. He has no rhonchi. He has no rales.   Abdominal: Soft. Bowel sounds are normal. He exhibits no distension. There is no tenderness. No BRB on ALAN, negative guaiac  Musculoskeletal: No edema. He exhibits no tenderness.   Left BKA noted   Neurological: Alert and oriented x3.  Skin: Skin is warm and dry.      ED Course   Procedures  Labs Reviewed   CBC W/ AUTO DIFFERENTIAL - Abnormal; Notable for the following components:       Result Value    RBC 2.64 (*)     Hemoglobin 8.7 (*)     Hematocrit 26.4 (*)     Mean Corpuscular Volume 100 (*)     Mean Corpuscular Hemoglobin 33.0 (*)     Gran # (ANC) 8.9 (*)     Immature Grans (Abs) 0.06 (*)     Gran% 80.9 (*)     Lymph% 12.1 (*)     All other  components within normal limits   COMPREHENSIVE METABOLIC PANEL - Abnormal; Notable for the following components:    Potassium 3.4 (*)     Glucose 129 (*)     Creatinine 3.6 (*)     Calcium 7.9 (*)     Total Protein 5.4 (*)     Albumin 2.0 (*)     Alkaline Phosphatase 141 (*)     ALT 6 (*)     eGFR if  20.7 (*)     eGFR if non  17.9 (*)     All other components within normal limits   BASIC METABOLIC PANEL - Abnormal; Notable for the following components:    Potassium 2.9 (*)     Creatinine 3.5 (*)     Calcium 7.9 (*)     eGFR if  21.4 (*)     eGFR if non  18.5 (*)     All other components within normal limits   PROTIME-INR   BASIC METABOLIC PANEL   TYPE & SCREEN          Imaging Results    None          Medical Decision Making:   History:   Old Medical Records: I decided to obtain old medical records.  Initial Assessment:   Mr. Retana is a 53 yo AAM with ESRD on dialysis MWF, L below knee amputation 2/2 osteomyelitis, dCHF, GERD/esophagitis with hx multiple GIB, h/o multiple ICH w/o residual deficits presents to the [facility] ED on 9/30/18 with c/o persistent emesis x2 days. Emesis resolved in ED yesterday with IM Reglan x1. He is tachycardic, but HD stable. Due to persistent vomiting, administered 500 mL IVF bolus (careful hydration 2/2 ESRD). Gave Reglan IV x1, which improved symptoms, and he is no longer actively vomiting. This is his 2nd presentation to the ED for emesis in <24 hrs, with 2 admissions earlier this month for the same complaint. His Hgb today is 8.7 (down from 10.2 on 9/23). I spoke to GI who recommended conservative management with observation, Protonix, and q6 CBC due to his HD stability and recent EGD 9/23 showed signs of esophagitis, small hiatal hernia, no active or stigmata of bleeding.  Differential Diagnosis:   PUD, esophagitis, esophageal varices, gastritis, duodenitis, renzo-juan syndrome, AVM, hemobilia  ED Management:  IVF,  Reglan, labs revealed decrease in hgb compared to 9/23. Admit to hospital Medicine. T&S and blood product consent form done prior to pt leaving the ED.              Attending Attestation:   Physician Attestation Statement for Resident:  As the supervising MD   Physician Attestation Statement: I have personally seen and examined this patient.   I agree with the above history. -:   As the supervising MD I agree with the above PE.   -: Chart reviewed, multiple episodes of GI bleed, hx of esophagitis/gastritis. Recent 9/23/2019 EGD w/ esophagitis  Patient w/ hematemesis in the ED, looks ill, multiple episodes of vomiting  Antiemetics, small bolus IVF (ESRD, last dialysis today)  Labs, PPI, GI consult   As the supervising MD I agree with the above treatment, course, plan, and disposition.                       Clinical Impression:       ICD-10-CM ICD-9-CM   1. Gastrointestinal hemorrhage with hematemesis K92.0 578.0   2. Emesis, persistent R11.10 536.2   3. GERD with esophagitis K21.0 530.11   4. Erosive gastritis K29.60 535.40   5. History of Intracerebral Hemorrhage: L BG 5/2013; R BG 9/2016; R BG 11/2016; L caudate head 2/2017 Z86.79 V12.59   6. ESRD on hemodialysis N18.6 585.6    Z99.2 V45.11   7. History of left below knee amputation 12/18/13 Z89.512 V49.75   8. Anemia in chronic kidney disease, on chronic dialysis N18.6 285.21    D63.1 585.6    Z99.2 V45.11   9. Gastroparesis K31.84 536.3                                Kaylee Sandoval MD  Resident  09/30/19 2008       Komal Buck MD  09/30/19 4557

## 2019-10-01 NOTE — PROGRESS NOTES
[facility] Hospital Medicine  Progress Note    Patient Name: Vaughn Retana  MRN: [id number]  Patient Class: IP- Inpatient   Admission Date: 9/30/2019  Length of Stay: 1 days  Attending Physician: Lainey Wheat MD  Primary Care Provider: Primary Doctor [facility] Medicine Team: [facility] HOSP MED 3 Vince Castorena MD    Subjective:     Principal Problem:Gastrointestinal hemorrhage with hematemesis        HPI:  Mr. Retana is a 53 yo AAM with ESRD on dialysis MWF, L below knee amputation 2/2 osteomyelitis, dCHF, GERD, h/o multiple ICH w/o residual deficits, who presented to [facility] ED with primary complaint of hematemesis. Patient is a poor historian and has limited knowledge of his medical history. During the interview the patient would not cooperate and provide much/any history. History is via ED physician and chart review.    Patient presenting to ED with a c/c of hematemesis x 2 episodes. Of note patient was evaluated in the ED yesterday (9/29) for a similar compliant. During that visit he was given IV reglan and similiarly failed to elaporate on his chief complaint and was discharged. Prior to this visit, patient was admitted to [facility] (9/20-9/23) for hematemesis, he received a total 2U PRBC and underwent endoscopy on 9/23 which showed some evidence of erosive esophagitis but no active or ongoing bleeding. At time of discharge Hg noted to be 10.2 (9/23) and today 8.7 (9/30).    Per chart review patient he was seen in the ED yesterday, and vomiting resolved with 1 dose of IM Reglan. He returned today (9/30) with persistent coffee ground emesis. He denies blood in his stool. Also denies hemoptysis. He is minimally responsive when asked about his symptoms and ROS. Last dialyzed today (M/W/F HD schedule).     In the ED patient is hemodynamically stable. PPI 80mg IV, 500cc bolus x 2 doses, type and screen, blood consent and evaluated by GI.       PREVIOUS HISTORY:     The patient was discharged from the  hospital on 9/6/19 for similar symptoms of abdominal pain with intractable nausea and vomiting and was evaluated by GI at that time as well. However, a repeat EGD was not done due to the patient being seen a [facility] earlier in August and having had an EGD done on 8/2 showing grade A esophagitis. A NM gastric emptying study was done during the last admission that was suggestive of gastroparesis retaining 50% of his gastric contents 4 hours after meals. He was placed on a PPI 40 mg BID, Reglan TID before meals, and Carafate BID and reports being compliant with his medications.     The following is a break down of his GI procedure history:    9/23/19- EGD [facility] - LA Grade D reflux esophagitis. Medium-sized hiatal hernia. No active GI bleed.    8/22/19 - EGD [facility] - LA grade A reflux esophagitis with no active bleeding     3/7/2019 - EGD [facility] - LA Grade D reflux esophagitis involving the entire esophagus  with a 2 cm sliding hiatal hernia. Congestive gastropathy, erythematous doudenopathy. No specimens collected     5/22/2018 - EGD [facility] - LA grade D reflux esophagitis with small hiatal hernia. Normal stomach and duodenum. No specimens collected     3/16/2018 - EGD [facility] - LA grade C reflux esophagitis with non-bleeding gastric ulcer with a clean base and a 4 cm hiatal hernia. 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 Normal duodenum     3/8/2018 - EGD [facility] - LA Grade B reflux esophagitis with a 3 cm hiatal hernia. Gastric polyp no biopsies. Normal duodenum     4/4/2017 - EGD [facility] -  LA Grade D reflux esophagitis with a small hiatal hernia. Normal stomach and duodenum     4/4/2017 - Colonoscopy [facility] - 3 mm polyp removed from the transverse colon pathology adenomatous. 8 mm polyp found in the sigmoid colon removed Pathology adenovillous. Suggestion to repeat in 3 years    Overview/Hospital Course:  Patient admitted for evaluation of his anemia and hematemesis.  GI  consulted agrees with conservative IV PPI plan.  They recently scoped him on 9/23 which showed a grade D esophagitis. Continue patient's PPI.     Interval History: NAEON.  Patient noted to be hiccupping and dry heaving today.  He states that he has not coughed up or vomited blood since yesterday.     Review of Systems   Constitutional: Negative for fatigue and fever.   HENT: Negative for congestion.    Eyes: Negative for visual disturbance.   Respiratory: Positive for cough. Negative for shortness of breath.    Cardiovascular: Negative for chest pain.   Gastrointestinal: Negative for abdominal pain.   Genitourinary: Negative for dysuria.   Skin: Negative for rash.   Neurological: Negative for headaches.     Objective:     Vital Signs (Most Recent):  Temp: 97.8 °F (36.6 °C) (10/01/19 1310)  Pulse: 100 (10/01/19 1310)  Resp: 18 (10/01/19 1310)  BP: 104/64 (10/01/19 1310)  SpO2: 95 % (10/01/19 1310) Vital Signs (24h Range):  Temp:  [97.8 °F (36.6 °C)-99.4 °F (37.4 °C)] 97.8 °F (36.6 °C)  Pulse:  [] 100  Resp:  [12-18] 18  SpO2:  [95 %-100 %] 95 %  BP: (104-136)/(55-85) 104/64        There is no height or weight on file to calculate BMI.    Intake/Output Summary (Last 24 hours) at 10/1/2019 1451  Last data filed at 9/30/2019 2305  Gross per 24 hour   Intake 1600 ml   Output --   Net 1600 ml      Physical Exam   Constitutional: He is oriented to person, place, and time.   Patient is lying bed, appears alert, and in no acute distress.    HENT:   Head: Normocephalic and atraumatic.   Eyes: Pupils are equal, round, and reactive to light. EOM are normal.   Neck: Normal range of motion. No thyromegaly present.   Cardiovascular: Normal rate, regular rhythm and intact distal pulses.   Pulmonary/Chest: Effort normal and breath sounds normal. No respiratory distress.   Abdominal: Soft. Bowel sounds are normal. He exhibits no distension. There is no tenderness.   Musculoskeletal: Normal range of motion.   L BKA     Neurological: He is alert and oriented to person, place, and time.   Delayed when answering questions.   Psychiatric: He has a normal mood and affect.     Significant Labs: All pertinent labs within the past 24 hours have been reviewed.     Recent Labs   Lab 09/30/19  1711 09/30/19  1925 10/01/19  0005 10/01/19  0443 10/01/19  1107   WBC 10.94  --   --   --  9.57   HGB 8.7*  --   --   --  7.0*   HCT 26.4*  --   --   --  21.7*     --   --   --  200   *  --   --   --  101*   RDW 14.3  --   --   --  14.6*    144 141 142  --    K 3.4* 2.9* 4.1 4.0  --     107 103 105  --    CO2 27 27 30* 29  --    BUN 15 14 14 16  --    CREATININE 3.6* 3.5* 4.1* 4.5*  --    * 107 136* 117*  --    PROT 5.4*  --   --  5.2*  --    ALBUMIN 2.0*  --   --  1.9*  --    BILITOT 0.2  --   --  0.2  --    AST 17  --   --  24  --    ALKPHOS 141*  --   --  127  --    ALT 6*  --   --  9*  --        Significant Imaging: I have reviewed all pertinent imaging results/findings within the past 24 hours.         Assessment/Plan:      * Gastrointestinal hemorrhage with hematemesis  Anemia of chronic disease and possibly superimposed acute GIB loss due to gastritis vs PUD vs esophagitis, vs MWT (in setting of vomiting).   No prior history of cancer or esophageal varices on multiple prior EGDs.   Previous EGD 9/23, LA Grade D reflux esophagitis. Medium-sized hiatal hernia. No active GI bleed.  Patient hemodynamically stable    PLAN:  - Continue PPI 40mg BID,  - Judicious fluids (caution due to ESRD). Received 500cc bolus x 2 in the ED, however continues to be tachycardiac HR>120; will give another 500 cc/2hrs later in the evening if HR >120, patient received dialysis today  - Type and cross, consent.   - Transfuse for Hg <7g/dl.   - Check Hg/HCT q8h.  - Maintain 2 large bore IVs.   - GI consulted: they feel patient likely bleeding from known esophagitis, noted on endoscope on 9/23.  Recommend continue PPI.             Gastroparesis  -c/w home reglan    GERD with esophagitis  Continue PPI BID  H/o recurrent EGDs as noted above in HPI.   GI consulted      Chronic kidney disease-mineral and bone disorder  Continue renvela    History of Intracerebral Hemorrhage: L BG 5/2013; R BG 9/2016; R BG 11/2016; L caudate head 2/2017  Chronic, stable.   Continue to monitor.   Not currently on DAPT, or AC given h/o ICH/GIB.   PT/OT.    ESRD on hemodialysis  Consult nephrology for resumption of HD.   Dialyzes M/W/F.         Anemia in chronic kidney disease  -Refer to GI bleed         VTE Risk Mitigation (From admission, onward)         Ordered     IP VTE HIGH RISK PATIENT  Once      09/30/19 1944     Place MEL hose  Until discontinued      09/30/19 1944     Place sequential compression device  Until discontinued      09/30/19 1944                      Vince Castorena MD  Department of Hospital Medicine   [facility]

## 2019-10-02 NOTE — TREATMENT PLAN
GI Treatment Plan    Vaughn Retana is a 54 y.o. male admitted to hospital 9/30/2019 (Hospital Day: 3) due to Gastrointestinal hemorrhage with hematemesis.     Interval History  EGD today  - LA Grade D reflux esophagitis.  - No active GI bleed.    Laboratory    Recent Labs   Lab 10/01/19  1742 10/02/19  0038 10/02/19  0610   HGB 7.6* 6.2* 7.6*       Plan  - s/p EGD, see formal report for additional details. LA Gr D esophagitis but no active bleeding to explain paint's declining H&H since admission. Recommend consideration of CT to exclude occult bleed such as RP hematoma.  - if CT negative will discuss colonoscopy (patient due soon for 2020 due to prior tubular adenoma)  - protonix 40mg BID and carafate   - Plan of care was discussed with primary team.  - We will continue to follow.    Thank you for involving us in the care of Vaughn Retana. Please call with any additional questions, concerns or changes in the patient's clinical status.    Yimi Jacques MD  Gastroenterology Fellow  Spectralink: [phone]

## 2019-10-02 NOTE — HPI
Mr. Retana is a 54 year-old AA male well-known to the ESRD service who was admitted on 9/30 with complaints of coffee-grind emesis. He has a history of ESRD (on HD, MWF), Esophagitis, HFpEF, ICH, Osteomyelitis, GERD, HTN, CVA, and pHTN. Mr. Retana also has a history of multiple hospitalizations for gastrointestinal hemorrhage with hematemesis. His most recent visit was 9/20 - 9/23 with hematemesis secondary to esophagitis. He underwent a EGD which was notable for moderate hiatal hernia and Gr D esophagitis. He subsequently discharged on BID PPI and carafate. According to the records, it is unsure if the patient actually took these medications. He returned to the ED on 9/29 and 9/30 with nausea and vomiting, but was admitted on 9/30 because his labs were notable for decline in H&H from 10.2 -> 8.7. GI following and planning for a repeat EGD today, 10/2.     The patient is a MWF dialysis patient at [facility]. She states that he dialyzes for 3.5 hrs via RUE AVG. He states that he last dialyze on Monday. He is unsure of his dry weight and states that he does not make any urine. Nephrology consulted for management of ESRD and HD treatment.

## 2019-10-02 NOTE — PROGRESS NOTES
[facility] Hospital Medicine  Progress Note    Patient Name: Vaughn Retana  MRN: [id number]  Patient Class: IP- Inpatient   Admission Date: 9/30/2019  Length of Stay: 2 days  Attending Physician: Lainey Wheat MD  Primary Care Provider: [facility] Medicine Team: [facility] HOSP MED 3 Vince Castorena MD    Subjective:     Principal Problem:Gastrointestinal hemorrhage with hematemesis        HPI:  Mr. Retana is a 55 yo AAM with ESRD on dialysis MWF, L below knee amputation 2/2 osteomyelitis, dCHF, GERD, h/o multiple ICH w/o residual deficits, who presented to [facility] ED with primary complaint of hematemesis. Patient is a poor historian and has limited knowledge of his medical history. During the interview the patient would not cooperate and provide much/any history. History is via ED physician and chart review.    Patient presenting to ED with a c/c of hematemesis x 2 episodes. Of note patient was evaluated in the ED yesterday (9/29) for a similar compliant. During that visit he was given IV reglan and similiarly failed to elaporate on his chief complaint and was discharged. Prior to this visit, patient was admitted to [facility] (9/20-9/23) for hematemesis, he received a total 2U PRBC and underwent endoscopy on 9/23 which showed some evidence of erosive esophagitis but no active or ongoing bleeding. At time of discharge Hg noted to be 10.2 (9/23) and today 8.7 (9/30).    Per chart review patient he was seen in the ED yesterday, and vomiting resolved with 1 dose of IM Reglan. He returned today (9/30) with persistent coffee ground emesis. He denies blood in his stool. Also denies hemoptysis. He is minimally responsive when asked about his symptoms and ROS. Last dialyzed today (M/W/F HD schedule).     In the ED patient is hemodynamically stable. PPI 80mg IV, 500cc bolus x 2 doses, type and screen, blood consent and evaluated by GI.       PREVIOUS HISTORY:     The patient was discharged from the  hospital on 9/6/19 for similar symptoms of abdominal pain with intractable nausea and vomiting and was evaluated by GI at that time as well. However, a repeat EGD was not done due to the patient being seen a [facility] earlier in August and having had an EGD done on 8/2 showing grade A esophagitis. A NM gastric emptying study was done during the last admission that was suggestive of gastroparesis retaining 50% of his gastric contents 4 hours after meals. He was placed on a PPI 40 mg BID, Reglan TID before meals, and Carafate BID and reports being compliant with his medications.     The following is a break down of his GI procedure history:    9/23/19- EGD [facility] - LA Grade D reflux esophagitis. Medium-sized hiatal hernia. No active GI bleed.    8/22/19 - EGD [facility] - LA grade A reflux esophagitis with no active bleeding     3/7/2019 - EGD [facility] - LA Grade D reflux esophagitis involving the entire esophagus  with a 2 cm sliding hiatal hernia. Congestive gastropathy, erythematous doudenopathy. No specimens collected     5/22/2018 - EGD [facility] - LA grade D reflux esophagitis with small hiatal hernia. Normal stomach and duodenum. No specimens collected     3/16/2018 - EGD [facility] - LA grade C reflux esophagitis with non-bleeding gastric ulcer with a clean base and a 4 cm hiatal hernia. 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 Normal duodenum     3/8/2018 - EGD [facility] - LA Grade B reflux esophagitis with a 3 cm hiatal hernia. Gastric polyp no biopsies. Normal duodenum     4/4/2017 - EGD [facility] -  LA Grade D reflux esophagitis with a small hiatal hernia. Normal stomach and duodenum     4/4/2017 - Colonoscopy [facility] - 3 mm polyp removed from the transverse colon pathology adenomatous. 8 mm polyp found in the sigmoid colon removed Pathology adenovillous. Suggestion to repeat in 3 years    Overview/Hospital Course:  Patient admitted for evaluation of his anemia and hematemesis.  GI  consulted agrees with conservative IV PPI plan.  They recently scoped him on 9/23 which showed a grade D esophagitis. Continue patient's PPI.  H/H dropped from 8.7 to 7.0 today.  NPO @ midnight with EGD scheduled for tomorrow morning.  Overnight, patient's Hgb continued to drop to 6.2, ordered 1x u pRBC.  EGD following morning was unrevealing for source of GI bleed.     Interval History: Hemoglobin dropped overnight to 6.2. Transfused 1x unit of pRBCs. He denies any further episodes of hematemesis.  GI preformed EGD which was negative for any active bleeding.     Review of Systems   Constitutional: Negative for fatigue and fever.   HENT: Negative for congestion.    Eyes: Negative for visual disturbance.   Respiratory: Negative for cough and shortness of breath.    Cardiovascular: Negative for chest pain.   Gastrointestinal: Negative for abdominal pain.   Genitourinary: Negative for dysuria.   Skin: Negative for rash.   Neurological: Negative for headaches.     Objective:     Vital Signs (Most Recent):  Temp: 98 °F (36.7 °C) (10/02/19 1446)  Pulse: 92 (10/02/19 1600)  Resp: 20 (10/02/19 1319)  BP: 119/68 (10/02/19 1600)  SpO2: 96 % (10/02/19 1319) Vital Signs (24h Range):  Temp:  [97.5 °F (36.4 °C)-98.9 °F (37.2 °C)] 98 °F (36.7 °C)  Pulse:  [] 92  Resp:  [16-21] 20  SpO2:  [93 %-100 %] 96 %  BP: (119-157)/(65-87) 119/68        There is no height or weight on file to calculate BMI.    Intake/Output Summary (Last 24 hours) at 10/2/2019 1610  Last data filed at 10/2/2019 1214  Gross per 24 hour   Intake 573.75 ml   Output --   Net 573.75 ml      Physical Exam   Constitutional: He is oriented to person, place, and time.   Patient is lying bed, appears alert, and in no acute distress.    HENT:   Head: Normocephalic and atraumatic.   Eyes: Pupils are equal, round, and reactive to light. EOM are normal.   Neck: Normal range of motion. No thyromegaly present.   Cardiovascular: Normal rate, regular rhythm and intact  distal pulses.   Pulmonary/Chest: Effort normal and breath sounds normal. No respiratory distress.   Abdominal: Soft. Bowel sounds are normal. He exhibits no distension. There is no tenderness.   Musculoskeletal: Normal range of motion.   L BKA    Neurological: He is alert and oriented to person, place, and time.   Delayed when answering questions.   Psychiatric: He has a normal mood and affect.     Significant Labs: All pertinent labs within the past 24 hours have been reviewed.     Recent Labs   Lab 09/30/19  1711  10/01/19  0005 10/01/19  0443  10/02/19  0038 10/02/19  0610 10/02/19  1335   WBC 10.94  --   --   --    < > 7.41 12.93* 7.20   HGB 8.7*  --   --   --    < > 6.2* 7.6* 7.9*   HCT 26.4*  --   --   --    < > 19.4* 24.4* 25.1*     --   --   --    < > 193 199 194   *  --   --   --    < > 101* 99* 100*   RDW 14.3  --   --   --    < > 14.5 14.7* 15.3*      < > 141 142  --   --  142  --    K 3.4*   < > 4.1 4.0  --   --  3.5  --       < > 103 105  --   --  103  --    CO2 27   < > 30* 29  --   --  30*  --    BUN 15   < > 14 16  --   --  25*  --    CREATININE 3.6*   < > 4.1* 4.5*  --   --  6.6*  --    *   < > 136* 117*  --   --  96  --    PROT 5.4*  --   --  5.2*  --   --  5.3*  --    ALBUMIN 2.0*  --   --  1.9*  --   --  1.9*  --    BILITOT 0.2  --   --  0.2  --   --  0.9  --    AST 17  --   --  24  --   --  22  --    ALKPHOS 141*  --   --  127  --   --  142*  --    ALT 6*  --   --  9*  --   --  10  --     < > = values in this interval not displayed.       Significant Imaging: I have reviewed all pertinent imaging results/findings within the past 24 hours.         Assessment/Plan:      * Gastrointestinal hemorrhage with hematemesis  Anemia of chronic disease and possibly superimposed acute GIB loss due to gastritis vs PUD vs esophagitis, vs MWT (in setting of vomiting).   No prior history of cancer or esophageal varices on multiple prior EGDs.   Previous EGD 9/23, LA Grade D  reflux esophagitis. Medium-sized hiatal hernia. No active GI bleed.  Patient hemodynamically stable    PLAN:  - EGD (10/2): unrevealing for any acute GI bleed.  Will consider further imaging to look for cause of decreased hemoglobin, possibly retroperitoneal per GI recommendations.    - Continue PPI 40mg BID,  - Judicious fluids (caution due to ESRD)  - Type and cross, consent.   - Transfuse for Hg <7g/dl.   - Check Hg/HCT q8h.  - Maintain 2 large bore IVs.   - GI consulted, appreciate recommendations           Gastroparesis  -c/w home reglan    GERD with esophagitis  Continue PPI BID  H/o recurrent EGDs as noted above in HPI.   GI consulted      Chronic kidney disease-mineral and bone disorder  Continue renvela    History of Intracerebral Hemorrhage: L BG 5/2013; R BG 9/2016; R BG 11/2016; L caudate head 2/2017  Chronic, stable.   Continue to monitor.   Not currently on DAPT, or AC given h/o ICH/GIB.   PT/OT.    ESRD on hemodialysis  Consult nephrology for resumption of HD.   Dialyzes M/W/F.         Anemia in chronic kidney disease  -Refer to GI bleed         VTE Risk Mitigation (From admission, onward)         Ordered     IP VTE HIGH RISK PATIENT  Once      09/30/19 1944     Place MEL hose  Until discontinued      09/30/19 1944     Place sequential compression device  Until discontinued      09/30/19 1944                      Vince Castorena MD  Department of Hospital Medicine   [facility]

## 2019-10-02 NOTE — ASSESSMENT & PLAN NOTE
The patient is 53 yo AA male admitted with complaints of coffee-grind emesis. Patient with notable drop in his H/H in the ED. Admitted for a GI evaluation.     RICHARD ROBERTSON  [facility]   3.5 hrs     Plan:   - Will provide dialysis today for metabolic clearance and volume management per outpatient schedule, target UF 1-2 L as tolerated, keep MAP >65.  - Will collect outpatient care plan, patient is not familiar with his dialysis prescription and does not know his dry weight   - Recommend renal diet when appropriate   - Phos 2.1, hold binders   - Hgb 7.6, s/p blood transfusion overnight    - Will check outpatient care plan for JONAS therapy   - Will discuss with staff

## 2019-10-02 NOTE — SUBJECTIVE & OBJECTIVE
Past Medical History:   Diagnosis Date    Amputation stump pain 9/10/2013    Aspiration pneumonia 7/27/2015    Asterixis 11/8/2016    C. difficile colitis 8/7/2015    Cholelithiasis without obstruction 8/25/2015    Chronic diastolic heart failure     2-23-17   1 - Low normal to mildly depressed left ventricular systolic function (EF 50-55%).    2 - Right ventricular enlargement with mildly depressed systolic function.    3 - Left ventricular diastolic dysfunction.    4 - Right atrial enlargement.    5 - Severe tricuspid regurgitation.    6 - Pulmonary hypertension. The estimated PA systolic pressure is 86 mmHg.    7 - Increased central venous pressure.    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    Metabolic acidosis, IAG, reduced excretion of inorganic acids     Myoclonic jerking 9/20/2016    Noncompliance with medication regimen 12/4/2018    Secondary hyperparathyroidism (of renal origin)     Secondary pulmonary hypertension 3/23/2017    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 HD Patient MWF, K+ lab prior to procedure.      ESOPHAGOGASTRODUODENOSCOPY N/A 6/12/2018    Procedure: EGD (ESOPHAGOGASTRODUODENOSCOPY);  Surgeon: Man Galicia MD;  Location: [facility] (2ND FLR);  Service: Endoscopy;  Laterality: N/A;  EGD in 8-12 weeks with Dr. Galicia on 4th floor for follow up erosive esophagitis and Mejia's surveillance.    Patient should be on Pantoprazole 40mg every 12 hours or the equivulant of another PPI    awaiting for patient to reply back regarding changing    ESOPHAGOGASTRODUODENOSCOPY N/A 3/7/2019    Procedure: EGD (ESOPHAGOGASTRODUODENOSCOPY);  Surgeon: Man Galicia MD;  Location: [facility] (2ND FLR);  Service: Endoscopy;  Laterality: N/A;    ESOPHAGOGASTRODUODENOSCOPY N/A 9/23/2019    Procedure: EGD (ESOPHAGOGASTRODUODENOSCOPY);  Surgeon: Keanu Rainey MD;  Location: [facility] (2ND FLR);  Service: Endoscopy;  Laterality: N/A;    FOOT AMPUTATION THROUGH METATARSAL      left foot    LEG AMPUTATION THROUGH KNEE  12/18/2013    left BKA    R AVF  9/12/12    UPPER GASTROINTESTINAL ENDOSCOPY         Review of patient's allergies indicates:   Allergen Reactions    Fosrenol [lanthanum] Nausea And Vomiting     Nausea and vomiting     Current Facility-Administered Medications   Medication Frequency    0.9%  NaCl infusion (for blood administration) Q24H PRN    0.9%  NaCl infusion Once    dextrose 10% (D10W) Bolus PRN    dextrose 10% (D10W) Bolus PRN    glucagon (human recombinant) injection 1 mg PRN    glucose chewable tablet 16 g PRN    glucose chewable tablet 24 g PRN    metoclopramide HCl tablet 10 mg TID AC    ondansetron injection 4 mg Q8H PRN    pantoprazole injection 40 mg BID    sevelamer carbonate tablet 1,600 mg TID WM    sodium chloride 0.9% flush 10 mL PRN    sucralfate 100 mg/mL suspension 0.5 g BID     Family History     Problem Relation (Age of Onset)    Alcohol abuse Maternal Grandmother    Diabetes Brother, Maternal Grandfather    Early death Mother    Heart disease Father    Hyperlipidemia  Father    Hypertension Father, Sister    Kidney disease Father        Tobacco Use    Smoking status: Former Smoker     Packs/day: 1.00     Years: 10.00     Pack years: 10.00    Smokeless tobacco: Never Used   Substance and Sexual Activity    Alcohol use: No    Drug use: No    Sexual activity: Yes     Partners: Female     Birth control/protection: None     Review of Systems   Constitutional: Negative for fatigue and fever.   HENT: Negative for congestion.    Eyes: Negative for visual disturbance.   Respiratory: Negative for cough, shortness of breath and wheezing.    Cardiovascular: Negative for chest pain.   Gastrointestinal: Positive for nausea and vomiting. Negative for abdominal pain and blood in stool.   Genitourinary: Negative for dysuria.   Skin: Negative for rash.   Neurological: Negative for headaches.     Objective:     Vital Signs (Most Recent):  Temp: 98.1 °F (36.7 °C) (10/02/19 0800)  Pulse: 82 (10/02/19 0800)  Resp: 20 (10/02/19 0800)  BP: (!) 148/65 (10/02/19 0800)  SpO2: (!) 93 % (10/02/19 0800)  O2 Device (Oxygen Therapy): room air (10/02/19 0800) Vital Signs (24h Range):  Temp:  [97.8 °F (36.6 °C)-98.9 °F (37.2 °C)] 98.1 °F (36.7 °C)  Pulse:  [] 82  Resp:  [18-20] 20  SpO2:  [93 %-100 %] 93 %  BP: (104-148)/(64-86) 148/65        There is no height or weight on file to calculate BMI.  There is no height or weight on file to calculate BSA.    I/O last 3 completed shifts:  In: 1573.8 [P.O.:240; Blood:233.8; IV Piggyback:1100]  Out: -     Physical Exam   HENT:   Head: Normocephalic and atraumatic.   Eyes: Pupils are equal, round, and reactive to light. EOM are normal.   Neck: Normal range of motion. No thyromegaly present.   Cardiovascular: Normal rate, regular rhythm and intact distal pulses.   Pulmonary/Chest: Effort normal and breath sounds normal. No respiratory distress.   Abdominal: Soft. Bowel sounds are normal. He exhibits no distension. There is no tenderness.   Musculoskeletal:  Normal range of motion.   L BKA    Neurological: He is alert.   Confused at times    Psychiatric: He has a normal mood and affect. His behavior is normal.     Significant Labs:  CBC:   Recent Labs   Lab 10/02/19  0610   WBC 12.93*   RBC 2.47*   HGB 7.6*   HCT 24.4*      MCV 99*   MCH 30.8   MCHC 31.1*     CMP:   Recent Labs   Lab 10/02/19  0610   GLU 96   CALCIUM 7.8*   ALBUMIN 1.9*   PROT 5.3*      K 3.5   CO2 30*      BUN 25*   CREATININE 6.6*   ALKPHOS 142*   ALT 10   AST 22   BILITOT 0.9

## 2019-10-02 NOTE — CONSULTS
[facility]  Nephrology  Consult Note    Patient Name: Vaughn Retana  MRN: [id number]  Admission Date: 9/30/2019  Hospital Length of Stay: 2 days  Attending Provider: Lainey Wheat MD   Primary Care Physician: Primary Doctor No  Principal Problem:Gastrointestinal hemorrhage with hematemesis    Inpatient consult to Nephrology  Consult performed by: Glenis Benavidez DNP, FNP-C  Consult ordered by: Jonatan Crawford MD  Reason for consult: ESRD Management        Subjective:     HPI: Mr. Retana is a 54 year-old AA male well-known to the ESRD service who was admitted on 9/30 with complaints of coffee-grind emesis. He has a history of ESRD (on HD, MWF), Esophagitis, HFpEF, ICH, Osteomyelitis, GERD, HTN, CVA, and pHTN. Mr. Retana also has a history of multiple hospitalizations for gastrointestinal hemorrhage with hematemesis. His most recent visit was 9/20 - 9/23 with hematemesis secondary to esophagitis. He underwent a EGD which was notable for moderate hiatal hernia and Gr D esophagitis. He subsequently discharged on BID PPI and carafate. According to the records, it is unsure if the patient actually took these medications. He returned to the ED on 9/29 and 9/30 with nausea and vomiting, but was admitted on 9/30 because his labs were notable for decline in H&H from 10.2 -> 8.7. GI following and planning for a repeat EGD today, 10/2.     The patient is a MWF dialysis patient at [facility]. She states that he dialyzes for 3.5 hrs via RUE AVF. He states that he last dialyze on Monday. He is unsure of his dry weight and states that he does not make any urine. Nephrology consulted for management of ESRD and HD treatment.      Past Medical History:   Diagnosis Date    Amputation stump pain 9/10/2013    Aspiration pneumonia 7/27/2015    Asterixis 11/8/2016    C. difficile colitis 8/7/2015    Cholelithiasis without obstruction 8/25/2015    Chronic diastolic heart failure     2-23-17   1 -  Low normal to mildly depressed left ventricular systolic function (EF 50-55%).    2 - Right ventricular enlargement with mildly depressed systolic function.    3 - Left ventricular diastolic dysfunction.    4 - Right atrial enlargement.    5 - Severe tricuspid regurgitation.    6 - Pulmonary hypertension. The estimated PA systolic pressure is 86 mmHg.    7 - Increased central venous pressure.    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    Metabolic acidosis, IAG, reduced excretion of inorganic acids     Myoclonic jerking 9/20/2016    Noncompliance with medication regimen 12/4/2018    Secondary hyperparathyroidism (of renal origin)     Secondary pulmonary hypertension 3/23/2017    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    TB lung, latent 08/25/2015    Negative Quantiferon Gold 3-23-17       Past Surgical History:   Procedure Laterality Date    COLONOSCOPY      COLONOSCOPY N/A 4/4/2017    Procedure: COLONOSCOPY;  Surgeon: Walker Stern MD;  Location: [address]);  Service: Endoscopy;  Laterality: N/A;  PA Systolic Pressure 85.56. HD Patient MWF, K+ lab prior to procedure.     ESOPHAGOGASTRODUODENOSCOPY N/A 6/12/2018    Procedure: EGD (ESOPHAGOGASTRODUODENOSCOPY);  Surgeon: Man Galicia MD;  Location: [facility] ([address] [place] [address]);  Service: Endoscopy;  Laterality: N/A;  EGD in 8-12 weeks with Dr. Galicia on 4th floor for follow up erosive esophagitis and Mejia's  surveillance.    Patient should be on Pantoprazole 40mg every 12 hours or the equivulant of another PPI    awaiting for patient to reply back regarding changing    ESOPHAGOGASTRODUODENOSCOPY N/A 3/7/2019    Procedure: EGD (ESOPHAGOGASTRODUODENOSCOPY);  Surgeon: Man Galicia MD;  [facility]  Service: Endoscopy;  Laterality: N/A;    ESOPHAGOGASTRODUODENOSCOPY N/A 9/23/2019    Procedure: EGD (ESOPHAGOGASTRODUODENOSCOPY);  Surgeon: Keanu Rainey MD;  [facility]  Service: Endoscopy;  Laterality: N/A;    FOOT AMPUTATION THROUGH METATARSAL      left foot    LEG AMPUTATION THROUGH KNEE  12/18/2013    left BKA    R AVF  9/12/12    UPPER GASTROINTESTINAL ENDOSCOPY         Review of patient's allergies indicates:   Allergen Reactions    Fosrenol [lanthanum] Nausea And Vomiting     Nausea and vomiting     Current Facility-Administered Medications   Medication Frequency    0.9%  NaCl infusion (for blood administration) Q24H PRN    0.9%  NaCl infusion Once    dextrose 10% (D10W) Bolus PRN    dextrose 10% (D10W) Bolus PRN    glucagon (human recombinant) injection 1 mg PRN    glucose chewable tablet 16 g PRN    glucose chewable tablet 24 g PRN    metoclopramide HCl tablet 10 mg TID AC    ondansetron injection 4 mg Q8H PRN    pantoprazole injection 40 mg BID    sevelamer carbonate tablet 1,600 mg TID WM    sodium chloride 0.9% flush 10 mL PRN    sucralfate 100 mg/mL suspension 0.5 g BID     Family History     Problem Relation (Age of Onset)    Alcohol abuse Maternal Grandmother    Diabetes Brother, Maternal Grandfather    Early death Mother    Heart disease Father    Hyperlipidemia Father    Hypertension Father, Sister    Kidney disease Father        Tobacco Use    Smoking status: Former Smoker     Packs/day: 1.00     Years: 10.00     Pack years: 10.00    Smokeless tobacco: Never Used   Substance and Sexual Activity    Alcohol use: No    Drug use: No    Sexual  activity: Yes     Partners: Female     Birth control/protection: None     Review of Systems   Constitutional: Negative for fatigue and fever.   HENT: Negative for congestion.    Eyes: Negative for visual disturbance.   Respiratory: Negative for cough, shortness of breath and wheezing.    Cardiovascular: Negative for chest pain.   Gastrointestinal: Positive for nausea and vomiting. Negative for abdominal pain and blood in stool.   Genitourinary: Negative for dysuria.   Skin: Negative for rash.   Neurological: Negative for headaches.     Objective:     Vital Signs (Most Recent):  Temp: 98.1 °F (36.7 °C) (10/02/19 0800)  Pulse: 82 (10/02/19 0800)  Resp: 20 (10/02/19 0800)  BP: (!) 148/65 (10/02/19 0800)  SpO2: (!) 93 % (10/02/19 0800)  O2 Device (Oxygen Therapy): room air (10/02/19 0800) Vital Signs (24h Range):  Temp:  [97.8 °F (36.6 °C)-98.9 °F (37.2 °C)] 98.1 °F (36.7 °C)  Pulse:  [] 82  Resp:  [18-20] 20  SpO2:  [93 %-100 %] 93 %  BP: (104-148)/(64-86) 148/65        There is no height or weight on file to calculate BMI.  There is no height or weight on file to calculate BSA.    I/O last 3 completed shifts:  In: 1573.8 [P.O.:240; Blood:233.8; IV Piggyback:1100]  Out: -     Physical Exam   HENT:   Head: Normocephalic and atraumatic.   Eyes: Pupils are equal, round, and reactive to light. EOM are normal.   Neck: Normal range of motion. No thyromegaly present.   Cardiovascular: Normal rate, regular rhythm and intact distal pulses.   Pulmonary/Chest: Effort normal and breath sounds normal. No respiratory distress.   Abdominal: Soft. Bowel sounds are normal. He exhibits no distension. There is no tenderness.   Musculoskeletal: Normal range of motion.   L BKA    Neurological: He is alert.   Confused at times    Psychiatric: He has a normal mood and affect. His behavior is normal.     Significant Labs:  CBC:   Recent Labs   Lab 10/02/19  0610   WBC 12.93*   RBC 2.47*   HGB 7.6*   HCT 24.4*      MCV 99*   MCH  30.8   MCHC 31.1*     CMP:   Recent Labs   Lab 10/02/19  0610   GLU 96   CALCIUM 7.8*   ALBUMIN 1.9*   PROT 5.3*      K 3.5   CO2 30*      BUN 25*   CREATININE 6.6*   ALKPHOS 142*   ALT 10   AST 22   BILITOT 0.9           Assessment/Plan:     * Gastrointestinal hemorrhage with hematemesis  - Being follow by primary team and GI     ESRD on hemodialysis  The patient is 53 yo AA male admitted with complaints of coffee-grind emesis. Patient with notable drop in his H/H in the ED. Admitted for a GI evaluation.     RICHARD AVF  C-Deckbar   3.5 hrs   65 kg   Dr. Lemus    Plan:   - Will provide dialysis today for metabolic clearance and volume management per outpatient schedule, target UF 1-2 L as tolerated, keep MAP >65.  - Care plan from outpatient facility reviewed   - Recommend renal diet when appropriate   - Phos 2.1, hold binders   - Hgb 7.6, s/p blood transfusion overnight    - Will check outpatient care plan for JONAS therapy   - Will discuss with staff           Thank you for your consult. I will follow-up with patient. Please contact us if you have any additional questions.    Glenis Benavidez DNP, FNP-C  Nephrology  [facility]

## 2019-10-02 NOTE — DISCHARGE INSTRUCTIONS

## 2019-10-02 NOTE — PROGRESS NOTES
[facility] H[facility]ODIALYSIS NOTE     Patient currently on hemodialysis for removal of uremic toxins .     Patient seen and evaluated on hemodialysis, tolerating treatment, see HD flowsheet for vitals and assessments.      No Hypotension, chest pain, shortness of breath, cramping, nausea or vomiting.      Target UF 2 L as tolerated, keep MAP >65    D. ADILIA Benavidez, APRN, FNP-C  Department of Nephrology  [facility]  Pager: [phone]

## 2019-10-02 NOTE — ASSESSMENT & PLAN NOTE
Anemia of chronic disease and possibly superimposed acute GIB loss due to gastritis vs PUD vs esophagitis, vs MWT (in setting of vomiting).   No prior history of cancer or esophageal varices on multiple prior EGDs.   Previous EGD 9/23, LA Grade D reflux esophagitis. Medium-sized hiatal hernia. No active GI bleed.  Patient hemodynamically stable    PLAN:  - EGD (10/2): unrevealing for any acute GI bleed.  Will consider further imaging to look for cause of decreased hemoglobin, possibly retroperitoneal per GI recommendations.    - Continue PPI 40mg BID,  - Judicious fluids (caution due to ESRD)  - Type and cross, consent.   - Transfuse for Hg <7g/dl.   - Check Hg/HCT q8h.  - Maintain 2 large bore IVs.   - GI consulted, appreciate recommendations

## 2019-10-02 NOTE — NURSING TRANSFER
Nursing Transfer Note      10/2/2019     Transfer TO 80 from [facility] 34    Transfer via stretcher    Transfer with n/a    Transported by pt escort    Medicines sent: n/a    Chart send with patient: YES    Notified: HUNTER nurse Julieta [id number]    Patient reassessed at: 10/2/2019 @1509

## 2019-10-02 NOTE — NURSING
Return from dialysis removed 2.5L via stretcher. Excessive sputum.AAOX4. VVS. Safety maintained. Bed alarm activated. wctm

## 2019-10-02 NOTE — PLAN OF CARE
10/02/19 1556   Post-Acute Status   Post-Acute Authorization Placement   Post-Acute Placement Status Patient List Provided   Discharge Delays None known at this time     SNF list provided to bedside nurse as patient was ay HD. SW will remain available.    Mike Horton LMSW (ronnie)

## 2019-10-02 NOTE — PLAN OF CARE
PLAN OF CARE REVIEWED WITH PT.  PT AA+OX4.  ABLE TO MAKE NEEDS KNOWN.  DOES NOT APPEAR TO BE IN ANY DISTRESS.  NO C/O PAIN.  WILL CONTINUE TO REASSESS PAIN.  REQUIRES MODERATE ASSIST WITH ADLS.  PT IS ON HEMODIALYSIS.  PT MAKES LITTLE TO NO URINE.  PT REMAINS FREE FROM FALLS,INJURY, AND TRAUMA.  FALL PRECAUTIONS IN PLACE.  BED IN LOWEST POSITION WITH WHEELS LOCKED.  CALL LIGHT WITHIN REACH.  WILL CONTINUE TO MONITOR.

## 2019-10-02 NOTE — H&P
Short Stay Endoscopy History and Physical    PCP - Primary Doctor No     Procedure - EGD  ASA - per anesthesia  Mallampati - per anesthesia  History of Anesthesia problems - no  Family history Anesthesia problems -  no   Plan of anesthesia - General    HPI:  This is a 54 y.o. male here for evaluation of :   Anemia, nausea and vomiting.  Hgb down to 6.2, received 1U overnight.  Last vomited yesterday.  See consult note for further details.      ROS:  Constitutional: No fevers, chills, No weight loss  CV: No chest pain  Pulm: No cough, No shortness of breath  Ophtho: No vision changes  GI: see HPI  Derm: No rash    Medical History:  has a past medical history of Amputation stump pain (9/10/2013), Aspiration pneumonia (7/27/2015), Asterixis (11/8/2016), C. 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    Surgical History:  has a past surgical history that includes R AVF (9/12/12); Leg amputation through knee (12/18/2013); Foot amputation through metatarsal; Colonoscopy; Colonoscopy (N/A, 4/4/2017); Esophagogastroduodenoscopy  (N/A, 6/12/2018); Upper gastrointestinal endoscopy; Esophagogastroduodenoscopy (N/A, 3/7/2019); and Esophagogastroduodenoscopy (N/A, 9/23/2019).    Family History: family history includes Alcohol abuse in his maternal grandmother; Diabetes in his brother and maternal grandfather; Early death in his mother; Heart disease in his father; Hyperlipidemia in his father; Hypertension in his father and sister; Kidney disease in his father.. Otherwise no colon cancer, inflammatory bowel disease, or GI malignancies.    Social History:  reports that he has quit smoking. He has a 10.00 pack-year smoking history. He has never used smokeless tobacco. He reports that he does not drink alcohol or use drugs.    Review of patient's allergies indicates:   Allergen Reactions    Fosrenol [lanthanum] Nausea And Vomiting     Nausea and vomiting       Medications:   Medications Prior to Admission   Medication Sig Dispense Refill Last Dose    [DISCONTINUED] pantoprazole (PROTONIX) 40 MG tablet Take 40 mg by mouth 2 (two) times daily.       [DISCONTINUED] sucralfate (CARAFATE) 100 mg/mL suspension Take 500 mg by mouth 2 (two) times daily.       acetaminophen (TYLENOL) 325 MG tablet Take 2 tablets (650 mg total) by mouth every 8 (eight) hours as needed.  0 Unknown at Unknown time    carvedilol (COREG) 3.125 MG tablet Take 1 tablet (3.125 mg total) by mouth 2 (two) times daily with meals. 60 tablet 11     metoclopramide HCl (REGLAN) 10 MG tablet Take 1 tablet (10 mg total) by mouth 3 (three) times daily before meals. can be titrated up as tolerated to maximum of 40 mg/day, with addition of evening dose as indicated, 90 tablet 1     midodrine (PROAMATINE) 5 MG Tab Please take 30 min before dialysis on Monday Wednesday and Friday 60 tablet 3     ondansetron (ZOFRAN) 8 MG tablet Take 1 tablet (8 mg total) by mouth every 12 (twelve) hours as needed for Nausea. 60 tablet 1     RENAPLEX-D 800 mcg-12.5 mg -2,000 unit Tab Take 1 tablet by  mouth once daily.  3 Unknown at Unknown time    [DISCONTINUED] prochlorperazine (COMPAZINE) 10 MG tablet Take 1 tablet (10 mg total) by mouth every 8 (eight) hours as needed (Nausea). 14 tablet 0        Physical Exam:    Vital Signs:   Vitals:    10/02/19 0800   BP: (!) 148/65   Pulse: 82   Resp: 20   Temp: 98.1 °F (36.7 °C)       General Appearance: Well appearing in no acute distress  Eyes:    No scleral icterus  ENT: Neck supple, Lips, mucosa, and tongue normal; teeth and gums normal  Lungs: CTA anteriorly  Heart:  Regular rate, S1, S2 normal, no murmurs heard.  Abdomen: Soft, non tender, non distended with normal bowel sounds. No hepatosplenomegaly, ascites, or mass.  Extremities: No edema  Skin: No rash    Labs:  Lab Results   Component Value Date    WBC 12.93 (H) 10/02/2019    HGB 7.6 (L) 10/02/2019    HCT 24.4 (L) 10/02/2019     10/02/2019    CHOL 111 (L) 06/22/2019    TRIG 59 06/22/2019    HDL 40 06/22/2019    ALT 10 10/02/2019    AST 22 10/02/2019     10/02/2019    K 3.5 10/02/2019     10/02/2019    CREATININE 6.6 (H) 10/02/2019    BUN 25 (H) 10/02/2019    CO2 30 (H) 10/02/2019    TSH 0.695 03/06/2019    PSA 1.7 10/03/2018    INR 1.0 10/01/2019    HGBA1C 4.7 06/22/2019    HGBA1C 4.7 06/22/2019       Plan:  EGD for anemia, nausea and vomiting.    I have explained the risks and benefits of endoscopy procedures to the patient including but not limited to bleeding, perforation, infection, and death.  The patient was asked if they understand and allowed to ask any further questions to their satisfaction.      Fabio Jones MD

## 2019-10-02 NOTE — ANESTHESIA PREPROCEDURE EVALUATION
10/02/2019  Vaughn Retana is a 54 y.o., male.    Procedure: EGD (ESOPHAGOGASTRODUODENOSCOPY) (N/A Abdomen)   Anesthesia type: General/MAC   Diagnosis: Gastrointestinal hemorrhage with hematemesis [K92.0]         Pre-operative evaluation for Procedure(s) (LRB):  EGD (ESOPHAGOGASTRODUODENOSCOPY) (N/A)    Encounter Diagnoses   Name Primary?    Emesis, persistent     Gastrointestinal hemorrhage with hematemesis Yes    GERD with esophagitis     Erosive gastritis     History of Intracerebral Hemorrhage: L BG 5/2013; R BG 9/2016; R BG 11/2016; L caudate head 2/2017     ESRD on hemodialysis     History of left below knee amputation 12/18/13     Anemia in chronic kidney disease, on chronic dialysis     Gastroparesis     Chest pain        Review of patient's allergies indicates:   Allergen Reactions    Fosrenol [lanthanum] Nausea And Vomiting     Nausea and vomiting       No current facility-administered medications on file prior to encounter.      Current Outpatient Medications on File Prior to Encounter   Medication Sig Dispense Refill    acetaminophen (TYLENOL) 325 MG tablet Take 2 tablets (650 mg total) by mouth every 8 (eight) hours as needed.  0    carvedilol (COREG) 3.125 MG tablet Take 1 tablet (3.125 mg total) by mouth 2 (two) times daily with meals. 60 tablet 11    metoclopramide HCl (REGLAN) 10 MG tablet Take 1 tablet (10 mg total) by mouth 3 (three) times daily before meals. can be titrated up as tolerated to maximum of 40 mg/day, with addition of evening dose as indicated, 90 tablet 1    midodrine (PROAMATINE) 5 MG Tab Please take 30 min before dialysis on Monday Wednesday and Friday 60 tablet 3    ondansetron (ZOFRAN) 8 MG tablet Take 1 tablet (8 mg total) by mouth every 12 (twelve) hours as needed for Nausea. 60 tablet 1    RENAPLEX-D 800 mcg-12.5 mg -2,000 unit Tab Take 1 tablet by  mouth once daily.  3       Social History     Tobacco Use   Smoking Status Former Smoker    Packs/day: 1.00    Years: 10.00    Pack years: 10.00   Smokeless Tobacco Never Used       Social History     Substance and Sexual Activity   Alcohol Use No       Patient Active Problem List   Diagnosis    Anemia in chronic kidney disease    Secondary hyperparathyroidism of renal origin    Dyslipidemia    ESRD on hemodialysis    Peripheral vascular disease in diabetes mellitus    History of left below knee amputation 12/18/13    History of Intracerebral Hemorrhage: L BG 5/2013; R BG 9/2016; R BG 11/2016; L caudate head 2/2017    Secondary pulmonary hypertension    DVT (deep venous thrombosis)    Chronic kidney disease-mineral and bone disorder    Gastrointestinal hemorrhage    Renovascular hypertension    Abdominal pain    Chronic upper GI bleeding    Normocytic anemia    History of GI bleed    Erosive gastritis    Hemoptysis    Noncompliance    Severe malnutrition    History of TB (tuberculosis)    Hemodialysis-associated hypotension    Hematemesis with nausea    Anemia    GERD with esophagitis    Gastrointestinal hemorrhage with hematemesis    Chronic blood loss anemia    Uremia    Hepatitis C    Gastroparesis    Emesis, persistent       Past Surgical History:   Procedure Laterality Date    COLONOSCOPY      COLONOSCOPY N/A 4/4/2017    Procedure: COLONOSCOPY;  Surgeon: Walker Stern MD;  Location: [facility] ([address] [place] [address]);  Service: Endoscopy;  Laterality: N/A;  PA Systolic Pressure 85.56. HD Patient MWF, K+ lab prior to procedure.     ESOPHAGOGASTRODUODENOSCOPY N/A 6/12/2018    Procedure: EGD (ESOPHAGOGASTRODUODENOSCOPY);  Surgeon: Man Galicia MD;  Location: [facility] ([address] [place] [address]);  Service: Endoscopy;  Laterality: N/A;  EGD in 8-12 weeks with Dr. Galicia on 4th floor for follow up erosive esophagitis and Mejia's surveillance.    Patient should be on Pantoprazole 40mg every 12 hours or the  equivulant of another PPI    awaiting for patient to reply back regarding changing    ESOPHAGOGASTRODUODENOSCOPY N/A 3/7/2019    Procedure: EGD (ESOPHAGOGASTRODUODENOSCOPY);  Surgeon: Man Galicia MD;  Location: [facility] ([address] [place] [address]);  Service: Endoscopy;  Laterality: N/A;    ESOPHAGOGASTRODUODENOSCOPY N/A 9/23/2019    Procedure: EGD (ESOPHAGOGASTRODUODENOSCOPY);  Surgeon: Keanu Rainey MD;  Location: [facility] ([address] [place] [address]);  Service: Endoscopy;  Laterality: N/A;    FOOT AMPUTATION THROUGH METATARSAL      left foot    LEG AMPUTATION THROUGH KNEE  12/18/2013    left BKA    R AVF  9/12/12    UPPER GASTROINTESTINAL ENDOSCOPY             Recent Labs     10/02/19  0610   HCT 24.4*     Recent Labs     10/02/19  0610        Recent Labs     10/02/19  0610   K 3.5     Recent Labs     10/02/19  0610   CREATININE 6.6*     Recent Labs     10/02/19  0610   GLU 96     No results for input(s): PT in the last 72 hours.                    Anesthesia Evaluation         Review of Systems  Anesthesia Hx:  No problems with previous Anesthesia   Hematology/Oncology:  Hematology Normal   Oncology Normal     Cardiovascular:   Hypertension, well controlled Denies MI.    Denies Angina.    Pulmonary:   Denies COPD.  Denies Asthma. Shortness of breath Walks 6 blocks, only SOB if needs dialysis.   Renal/:   Chronic Renal Disease, ESRD    Neurological:   Denies TIA. Denies CVA. Denies Seizures.    Endocrine:   Denies Diabetes.        Physical Exam  General:  Well nourished    Airway/Jaw/Neck:  Airway Findings: Mouth Opening: Normal Tongue: Normal  General Airway Assessment: Adult, Average  Mallampati: II  TM Distance: Normal, at least 6 cm  Jaw/Neck Findings:  Neck ROM: Normal ROM            Mental Status:  Mental Status Findings:  Cooperative, Alert and Oriented         Anesthesia Plan  Type of Anesthesia, risks & benefits discussed:  Anesthesia Type:  general  Patient's Preference:   Intra-op Monitoring Plan:   Intra-op  Monitoring Plan Comments:   Post Op Pain Control Plan:   Post Op Pain Control Plan Comments: As per surgeon's plan  Induction:   IV  Beta Blocker:  Patient is not currently on a Beta-Blocker (No further documentation required).       Informed Consent: Patient understands risks and agrees with Anesthesia plan.  Questions answered. Anesthesia consent signed with patient.  ASA Score: 4     Day of Surgery Review of History & Physical:    H&P update referred to the surgeon.         Ready For Surgery From Anesthesia Perspective.

## 2019-10-02 NOTE — TRANSFER OF CARE
Anesthesia Transfer of Care Note    Patient: Vaughn Retana    Procedure(s) Performed: Procedure(s) (LRB):  EGD (ESOPHAGOGASTRODUODENOSCOPY) (N/A)    Patient location: PACU    Anesthesia Type: general    Transport from OR: Transported from OR on 6-10 L/min O2 by face mask with adequate spontaneous ventilation    Post pain: adequate analgesia    Post assessment: no apparent anesthetic complications    Post vital signs: stable    Level of consciousness: awake    Nausea/Vomiting: no nausea/vomiting    Complications: none    Transfer of care protocol was followed      Last vitals:   Visit Vitals  BP (!) 157/81 (BP Location: Left arm, Patient Position: Lying)   Pulse 82   Temp 36.8 °C (98.2 °F) (Temporal)   Resp 16   SpO2 100%

## 2019-10-02 NOTE — [employer]OVATION PATIENT INSTRUCTIONS
Discharge Summary/Instructions after an Endoscopic Procedure  Patient Name: Vaughn Retana  Patient MRN: [id number]  Patient YOB: 1965 Wednesday, October 02, 2019  Kevin De La Paz MD  RESTRICTIONS:  During your procedure today, you received medications for sedation.  These   medications may affect your judgment, balance and coordination.  Therefore,   for 24 hours, you have the following restrictions:   - DO NOT drive a car, operate machinery, make legal/financial decisions,   sign important papers or drink alcohol.    ACTIVITY:  Today: no heavy lifting, straining or running due to procedural   sedation/anesthesia.  The following day: return to full activity including work.  DIET:  Eat and drink normally unless instructed otherwise.     TREATMENT FOR COMMON SIDE EFFECTS:  - Mild abdominal pain, nausea, belching, bloating or excessive gas:  rest,   eat lightly and use a heating pad.  - Sore Throat: treat with throat lozenges and/or gargle with warm salt   water.  - Because air was used during the procedure, expelling large amounts of air   from your rectum or belching is normal.  - If a bowel prep was taken, you may not have a bowel movement for 1-3 days.    This is normal.  SYMPTOMS TO WATCH FOR AND REPORT TO YOUR PHYSICIAN:  1. Abdominal pain or bloating, other than gas cramps.  2. Chest pain.  3. Back pain.  4. Signs of infection such as: chills or fever occurring within 24 hours   after the procedure.  5. Rectal bleeding, which would show as bright red, maroon, or black stools.   (A tablespoon of blood from the rectum is not serious, especially if   hemorrhoids are present.)  6. Vomiting.  7. Weakness or dizziness.  GO DIRECTLY TO THE NEAREST EMERGENCY ROOM IF YOU HAVE ANY OF THE FOLLOWING:      Difficulty breathing              Chills and/or fever over 101 F   Persistent vomiting and/or vomiting blood   Severe abdominal pain   Severe chest pain   Black, tarry stools   Bleeding- more than one  tablespoon   Any other symptom or condition that you feel may need urgent attention  Your doctor recommends these additional instructions:  If any biopsies were taken, your doctors clinic will contact you in 1 to 2   weeks with any results.  - [facility] patient to [facility].   - Use a proton pump inhibitor by mouth every 12 hours for 8-12 weeks.   - Repeat upper endoscopy in 8-12 weeks to check healing.   - Use Protonix (pantoprazole) 40 mg by mouth every 12 hours..   - The findings and recommendations were discussed with the patient's primary   physician.  For questions, problems or results please call your physician - Kevin De La Paz MD at Work:  [phone].  [facility], EMERGENCY ROOM PHONE NUMBER: [phone]  IF A COMPLICATION OR EMERGENCY SITUATION ARISES AND YOU ARE UNABLE TO REACH   YOUR PHYSICIAN - GO DIRECTLY TO THE EMERGENCY ROOM.  Kevin De La Paz MD  10/2/2019 1:01:37 PM  This report has been verified and signed electronically.  [employer]

## 2019-10-03 NOTE — PLAN OF CARE
PLAN OF CARE REVIEWED WITH PT.  PT AA+OX4.  ABLE TO MAKE NEEDS KNOWN.  DOES NOT APPEAR TO BE IN ANY DISTRESS.  NO C/O PAIN.  WILL CONTINUE TO REASSESS PAIN.  REQUIRES MODERATE ASSIST WITH ADLS.  PT IS ON HEMODIALYSIS.  PT REMAINS FREE FROM FALLS, INJURY, AND TRAUMA.  FALL PRECAUTIONS IN PLACE.  BED IN LOWEST POSITION WITH WHEELS LOCKED.  CALL LIGHT WITHIN REACH.  WILL CONTINUE TO MONITOR.

## 2019-10-03 NOTE — PT/OT/SLP EVAL
"Occupational Therapy   Evaluation    Name: Vaughn Retana  MRN: [id number]  Admitting Diagnosis:  Gastrointestinal hemorrhage with hematemesis 1 Day Post-Op    Recommendations:     Discharge Recommendations: home health OT  Discharge Equipment Recommendations:  none  Barriers to discharge:  None    Assessment:     Vaughn Retana is a 54 y.o. male with a medical diagnosis of Gastrointestinal hemorrhage with hematemesis.  He presents with impaired ADL and fx'l mobility performance. Pt participated in bed mobility, EOB for donning prosthetic to LLE, and sit>stand with bedroom and hallway mobility. Pt lives alone in [facility] at baseline and is mod (I) at baseline using rollator. During OT/PT eval, pt demo SBA-CGA during mobility with several LOB and poor safety awareness noted. Performance deficits affecting function: impaired endurance, weakness, impaired self care skills, impaired functional mobilty, gait instability, decreased safety awareness, impaired balance. Pt would benefit from continued OT skilled services 2x/wk to improve daily living skills to optimize QOL. Pt is recommended to discharge to OT at this time.       Rehab Prognosis: Good; patient would benefit from acute skilled OT services to address these deficits and reach maximum level of function.       Plan:     Patient to be seen 2 x/week to address the above listed problems via self-care/home management, therapeutic activities, therapeutic exercises  · Plan of Care Expires: 11/03/19  · Plan of Care Reviewed with: patient    Subjective     Chief Complaint: "I can do all this so I dont know why ya'll making me get up"  Patient/Family Comments/goals: "I do all of this"    Occupational Profile:  Living Environment: Pt lives alone in [facility] with no ALEXEY. Pt with tub bench for bathing.  Previous level of function: Mod (I) for ADLs including donning/doffing LLE prosthesis. Pt ambulates with rollator at baseline.  Roles and Routines: Pt not driving nor working. Pt " expresses he is community dwelling and can receive medications/shelter needs through his sister or is able to walk to pharmacy/grocery.  Equipment Used at Home:  rollator  Assistance upon Discharge: Sister prn    Pain/Comfort:  · Pain Rating 1: 0/10  · Pain Rating Post-Intervention 1: 0/10    Patients cultural, spiritual, Roman Catholic conflicts given the current situation: no    Objective:     Communicated with: RN prior to session.  Patient found left sidelying with telemetry upon OT entry to room.    General Precautions: Standard, fall   Orthopedic Precautions:N/A   Braces: (LLE prosthetic)     Occupational Performance:    Bed Mobility:    · Patient completed Rolling/Turning to Left with  stand by assistance  · Patient completed Scooting/Bridging with stand by assistance  · Patient completed Supine to Sit with stand by assistance  · Patient completed Sit to Supine with stand by assistance    Functional Mobility/Transfers:  · Patient completed Sit <> Stand Transfer with contact guard assistance  with  rollator   · Functional Mobility: Pt completed bedroom and hallway ambulation with ~4 LOB using rollator. Pt with impulsivity noted and poor safety awareness. Pt used LLE prosthesis during mobility.    Activities of Daily Living:  · Upper Body Dressing: contact guard assistance donning gown at EOB   · Lower Body Dressing: stand by assistance donning prosthetic to LLE and donning tennis shoe to RLE at EOB     Cognitive/Visual Perceptual:  Cognitive/Psychosocial Skills:     -       Oriented to: Person, Place, Time, Situation and however unable to voice year    -       Follows Commands/attention:Easily distracted  -       Communication: clear/fluent  -       Memory: No Deficits noted  -       Safety awareness/insight to disability: impaired   -       Mood/Affect/Coping skills/emotional control: Withdrawn    Physical Exam:  Balance:    -       Demo good sitting balance and fair standing balance using Rollator  Upper  Extremity Range of Motion:     -       Right Upper Extremity: WNL  -       Left Upper Extremity: WNL  Upper Extremity Strength:    -       Right Upper Extremity: WNL  -       Left Upper Extremity: WNL   Strength:    -       Right Upper Extremity: WNL  -       Left Upper Extremity: WNL    AMPAC 6 Click ADL:  AMPAC Total Score: 24    Treatment & Education:  Pt educated on role of occupational therapy, POC, and safety during ADLs and functional mobility. Pt and OT discussed importance of safe, continued mobility to optimize daily living skills. Pt verbalized understanding. White board updated during session. Pt given instruction to call for medical staff/nurse for assistance.     Education:    Patient left HOB elevated with all lines intact and call button in reach    GOALS:   Multidisciplinary Problems     Occupational Therapy Goals        Problem: Occupational Therapy Goal    Goal Priority Disciplines Outcome Interventions   Occupational Therapy Goal     OT, PT/OT Ongoing, Progressing    Description:  Goals to be met by: 10/15/19     Patient will increase functional independence with ADLs by performing:    UE Dressing with Modified [place].  LE Dressing with Modified [place].  Grooming while standing with Modified [place].  Toileting from toilet with Modified [place] for hygiene and clothing management.   Rolling to Bilateral with Modified [place].   Supine to sit with Modified [place].  Step transfer with Modified [place]  Toilet transfer to toilet with Modified [place].                      History:     Past Medical History:   Diagnosis Date    Amputation stump pain 9/10/2013    Aspiration pneumonia 7/27/2015    Asterixis 11/8/2016    C. difficile colitis 8/7/2015    Cholelithiasis without obstruction 8/25/2015    Chronic diastolic heart failure     2-23-17   1 - Low normal to mildly depressed left ventricular systolic function (EF 50-55%).    2 - Right ventricular  enlargement with mildly depressed systolic function.    3 - Left ventricular diastolic dysfunction.    4 - Right atrial enlargement.    5 - Severe tricuspid regurgitation.    6 - Pulmonary hypertension. The estimated PA systolic pressure is 86 mmHg.    7 - Increased central venous pressure.    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    Metabolic acidosis, IAG, reduced excretion of inorganic acids     Myoclonic jerking 9/20/2016    Noncompliance with medication regimen 12/4/2018    Secondary hyperparathyroidism (of renal origin)     Secondary pulmonary hypertension 3/23/2017    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    TB lung, latent 08/25/2015    Negative Quantiferon Gold 3-23-17       Past Surgical History:   Procedure Laterality Date    COLONOSCOPY      COLONOSCOPY N/A 4/4/2017    Procedure: COLONOSCOPY;  Surgeon: Walker Stern MD;  Location: [facility] ([address] [place] [address]);  Service: Endoscopy;  Laterality: N/A;  PA Systolic Pressure 85.56. HD Patient MWF, K+ lab prior to procedure.     ESOPHAGOGASTRODUODENOSCOPY N/A 6/12/2018    Procedure: EGD (ESOPHAGOGASTRODUODENOSCOPY);  Surgeon: Man Galicia MD;  Location: [facility] ([address] [place] [address]);  Service: Endoscopy;  Laterality: N/A;  EGD in 8-12 weeks with Dr. Galicia on 4th floor for follow up erosive esophagitis and Mejia's surveillance.    Patient should be on Pantoprazole 40mg every 12 hours or the equivulant of another  PPI    awaiting for patient to reply back regarding changing    ESOPHAGOGASTRODUODENOSCOPY N/A 3/7/2019    Procedure: EGD (ESOPHAGOGASTRODUODENOSCOPY);  Surgeon: Man Galicia MD;  Location: [facility] ([address] [place] [address]);  Service: Endoscopy;  Laterality: N/A;    ESOPHAGOGASTRODUODENOSCOPY N/A 9/23/2019    Procedure: EGD (ESOPHAGOGASTRODUODENOSCOPY);  Surgeon: Keanu Rainey MD;  [facility]  Service: Endoscopy;  Laterality: N/A;    ESOPHAGOGASTRODUODENOSCOPY N/A 10/2/2019    Procedure: EGD (ESOPHAGOGASTRODUODENOSCOPY);  Surgeon: Kevin De La Paz MD;  [facility]  Service: Endoscopy;  Laterality: N/A;    FOOT AMPUTATION THROUGH METATARSAL      left foot    LEG AMPUTATION THROUGH KNEE  12/18/2013    left BKA    R AVF  9/12/12    UPPER GASTROINTESTINAL ENDOSCOPY         Time Tracking:     OT Date of Treatment: 10/03/19  OT Start Time: 0854  OT Stop Time: 0909  OT Total Time (min): 15 min    Billable Minutes:Evaluation 15 min    Bryanna Holman OT  10/3/2019

## 2019-10-03 NOTE — SUBJECTIVE & OBJECTIVE
Interval History:   NAEON. EGD no signs of bleeding. Patient denies hematemesis or melena. H/H remains stable.   Today patient is much more cooperative and talkative. Discussed placement with patient; nursing home; he agrees- family coming in today- will discuss with .      Objective:     Vital Signs (Most Recent):  Temp: 97.9 °F (36.6 °C) (10/03/19 1116)  Pulse: 91 (10/03/19 1116)  Resp: 17 (10/03/19 1116)  BP: 126/71 (10/03/19 1116)  SpO2: 100 % (10/03/19 1116) Vital Signs (24h Range):  Temp:  [96.3 °F (35.7 °C)-99.7 °F (37.6 °C)] 97.9 °F (36.6 °C)  Pulse:  [] 91  Resp:  [16-21] 17  SpO2:  [95 %-100 %] 100 %  BP: (118-157)/(61-87) 126/71        There is no height or weight on file to calculate BMI.    Intake/Output Summary (Last 24 hours) at 10/3/2019 1123  Last data filed at 10/3/2019 0852  Gross per 24 hour   Intake 1150 ml   Output 2500 ml   Net -1350 ml      Physical Exam   Constitutional: He is oriented to person, place, and time.   Patient is lying bed, appears alert, and in no acute distress.    HENT:   Head: Normocephalic and atraumatic.   Eyes: Pupils are equal, round, and reactive to light. EOM are normal.   Neck: Normal range of motion. No thyromegaly present.   Cardiovascular: Normal rate, regular rhythm and intact distal pulses.   Pulmonary/Chest: Effort normal and breath sounds normal. No respiratory distress.   Abdominal: Soft. Bowel sounds are normal. He exhibits no distension. There is no tenderness.   Musculoskeletal: Normal range of motion.   L BKA    Neurological: He is alert and oriented to person, place, and time.   Psychiatric: He has a normal mood and affect.       Significant Labs: All pertinent labs within the past 24 hours have been reviewed.    Significant Imaging: I have reviewed all pertinent imaging results/findings within the past 24 hours.

## 2019-10-03 NOTE — PLAN OF CARE
Problem: Occupational Therapy Goal  Goal: Occupational Therapy Goal  Description  Goals to be met by: 10/15/19     Patient will increase functional independence with ADLs by performing:    UE Dressing with Modified [place].  LE Dressing with Modified [place].  Grooming while standing with Modified [place].  Toileting from toilet with Modified [place] for hygiene and clothing management.   Rolling to Bilateral with Modified [place].   Supine to sit with Modified [place].  Step transfer with Modified [place]  Toilet transfer to toilet with Modified [place].     Outcome: Ongoing, Progressing      17.7

## 2019-10-03 NOTE — PT/OT/SLP EVAL
"Physical Therapy Evaluation    Patient Name:  Vaughn Retana   MRN:  [id number]    Recommendations:     Discharge Recommendations:  home health PT   Discharge Equipment Recommendations: none   Barriers to discharge: Decreased caregiver support    Assessment:     Vaughn Retana is a 54 y.o. male admitted with a medical diagnosis of Gastrointestinal hemorrhage with hematemesis.  He presents with the following impairments/functional limitations:  weakness, impaired endurance, impaired functional mobilty, gait instability, impaired balance, decreased safety awareness. Pt independently donned/doffed LLE prosthetic, AAOx4 but delayed response times, and demo reduced safety awareness throughout. Pt performed bed mobility with SBA and transfers with SBA and Rollater. Pt ambulated 40ft with Rollater and CGA-Angelique, experienced ~4 episodes of L knee buckling requiring occasional Angelique to regain balance. Pt safe to ambulate with Rollater and assist x 1 person. Pt would benefit from skilled PT services to improve functional mobility, balance, and ambulation to return to PLOF.      Rehab Prognosis: Good; patient would benefit from acute skilled PT services to address these deficits and reach maximum level of function.    Recent Surgery: Procedure(s) (LRB):  EGD (ESOPHAGOGASTRODUODENOSCOPY) (N/A) 1 Day Post-Op    Plan:     During this hospitalization, patient to be seen 2 x/week to address the identified rehab impairments via gait training, therapeutic activities, therapeutic exercises and progress toward the following goals:    · Plan of Care Expires:  11/02/19    Subjective     Chief Complaint: none  Patient/Family Comments/goals: "Y'all got me doing too much"  Pain/Comfort:  · Pain Rating 1: 0/10  · Pain Rating Post-Intervention 1: 0/10    Patients cultural, spiritual, Rastafari conflicts given the current situation: no    Living Environment:  Pt lives alone in a [facility] with 0 ALEXEY with a tub shower with a bench.     Prior to " admission, patients level of function was Independent with ADLs, Modified Independent for ambulation community distances with Rollater and LLE prosthetic, does not drive or work, reports no falls in the last 3 months .  Equipment used at home: rollator.  DME owned (not currently used): none.  Upon discharge, patient will have assistance from sister, limited.    Objective:     Communicated with RN prior to session.  Patient found supine with telemetry  upon PT entry to room.    General Precautions: Standard, fall   Orthopedic Precautions:N/A   Braces: (LLE prosthesis)     Exams:  · Cognitive Exam:  Patient is oriented to Person, Place, Time and Situation  · Sensation:    · -       Intact  · RLE ROM: WFL  · RLE Strength: WFL  · LLE ROM: L BKA  · LLE Strength: WFL    Functional Mobility:  · Bed Mobility:     · Scooting: stand by assistance  · Supine to Sit: stand by assistance  · Sit to Supine: stand by assistance  · Transfers:     · Sit to Stand:  stand by assistance with rollater x 2 trials  · Gait:   · 40ft with Rollater and CGA-Angelique, experienced ~4 episodes of L knee buckling requiring occasional Angelique to regain balance   · Balance:   · Sitting: Supervision   · Standing: CGA-SBA      Therapeutic Activities and Exercises:    -Pt educated on:              -PT roles, expectations, and POC               -Safety with mobility              -Benefits of OOB activities to increase strength and functional mobility               -Performing ther ex for increasing LE ROM and strength              -Discharge recommendations       AM-PAC 6 CLICK MOBILITY  Total Score:19     Patient left supine with all lines intact, call button in reach and bed alarm on.    GOALS:   Multidisciplinary Problems     Physical Therapy Goals        Problem: Physical Therapy Goal    Goal Priority Disciplines Outcome Goal Variances Interventions   Physical Therapy Goal     PT, PT/OT Ongoing, Progressing     Description:  Goals to be met by: 10/17/2019      Patient will increase functional independence with mobility by performin. Supine to sit with [place]  2. Sit to supine with [place]  3. Sit to stand transfer with Modified [place]  4. Gait x 200 feet with Rollater with Supervision.                        History:     Past Medical History:   Diagnosis Date    Amputation stump pain 9/10/2013    Aspiration pneumonia 2015    Asterixis 2016    C. difficile colitis 2015    Cholelithiasis without obstruction 2015    Chronic diastolic heart failure     17   1 - Low normal to mildly depressed left ventricular systolic function (EF 50-55%).    2 - Right ventricular enlargement with mildly depressed systolic function.    3 - Left ventricular diastolic dysfunction.    4 - Right atrial enlargement.    5 - Severe tricuspid regurgitation.    6 - Pulmonary hypertension. The estimated PA systolic pressure is 86 mmHg.    7 - Increased central venous pressure.    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 2015    Metabolic acidosis, IAG, reduced excretion of inorganic acids     Myoclonic jerking 2016    Noncompliance with medication regimen 2018    Secondary hyperparathyroidism (of renal origin)     Secondary pulmonary hypertension 3/23/2017    Stenosis of arteriovenous dialysis fistula 2014    TB lung, latent 2015    Negative Quantiferon Gold 3-23-17       Past Surgical History:    Procedure Laterality Date    COLONOSCOPY      COLONOSCOPY N/A 4/4/2017    Procedure: COLONOSCOPY;  Surgeon: Walker Stern MD;  Location: [facility] (2ND FLR);  Service: Endoscopy;  Laterality: N/A;  PA Systolic Pressure 85.56. HD Patient MWF, K+ lab prior to procedure.     ESOPHAGOGASTRODUODENOSCOPY N/A 6/12/2018    Procedure: EGD (ESOPHAGOGASTRODUODENOSCOPY);  Surgeon: Man Galicia MD;  Location: [facility] (2ND FLR);  Service: Endoscopy;  Laterality: N/A;  EGD in 8-12 weeks with Dr. Galicia on 4th floor for follow up erosive esophagitis and Mejia's surveillance.    Patient should be on Pantoprazole 40mg every 12 hours or the equivulant of another PPI    awaiting for patient to reply back regarding changing    ESOPHAGOGASTRODUODENOSCOPY N/A 3/7/2019    Procedure: EGD (ESOPHAGOGASTRODUODENOSCOPY);  Surgeon: Man Galicia MD;  [facility]  Service: Endoscopy;  Laterality: N/A;    ESOPHAGOGASTRODUODENOSCOPY N/A 9/23/2019    Procedure: EGD (ESOPHAGOGASTRODUODENOSCOPY);  Surgeon: Keanu Rainey MD;  [facility]  Service: Endoscopy;  Laterality: N/A;    ESOPHAGOGASTRODUODENOSCOPY N/A 10/2/2019    Procedure: EGD (ESOPHAGOGASTRODUODENOSCOPY);  Surgeon: Kevin De La Paz MD;  [facility]  Service: Endoscopy;  Laterality: N/A;    FOOT AMPUTATION THROUGH METATARSAL      left foot    LEG AMPUTATION THROUGH KNEE  12/18/2013    left BKA    R AVF  9/12/12    UPPER GASTROINTESTINAL ENDOSCOPY         Time Tracking:     PT Received On: 10/03/19  PT Start Time: 0854     PT Stop Time: 0909  PT Total Time (min): 15 min     Billable Minutes: Evaluation 15      Beatriz Garcia, PT  10/03/2019

## 2019-10-03 NOTE — ASSESSMENT & PLAN NOTE
Anemia of chronic disease and possibly superimposed acute GIB loss due to gastritis vs PUD vs esophagitis, vs MWT (in setting of vomiting).   No prior history of cancer or esophageal varices on multiple prior EGDs.   Previous EGD 9/23, LA Grade D reflux esophagitis. Medium-sized hiatal hernia. No active GI bleed.  Patient hemodynamically stable  EGD (10/2): unrevealing for any acute GI bleed.  Will consider further imaging to look for cause of decreased hemoglobin, possibly retroperitoneal per GI recommendations.      PLAN:  - Continue PPI 40mg BID,  - Type and cross, consent.   - Transfuse for Hg <7g/dl.   - Maintain 2 large bore IVs.   - GI consulted, appreciate recommendations  - patient's h/h stable; will decrease CBC q12

## 2019-10-03 NOTE — PLAN OF CARE
Problem: Physical Therapy Goal  Goal: Physical Therapy Goal  Description  Goals to be met by: 10/17/2019     Patient will increase functional independence with mobility by performin. Supine to sit with Thayer  2. Sit to supine with Thayer  3. Sit to stand transfer with Modified Thayer  4. Gait x 200 feet with [place] with Supervision.       Outcome: Ongoing, Progressing     Eval completed and POC established.   Beatriz Garcia, PT  10/3/2019

## 2019-10-03 NOTE — PLAN OF CARE
CM at bedside to see patient .  Nursing home referral list provided to patient.   Voice mails left with patient's sister and niece for nursing home choices.  Patient is requesting a facility in [facility] near his sister or niece.  Explained CM team continuing to follow the patient throughout the hospital admission for discharge needs and assistance.  CM name, phone # and anticipated D/C date updated on whiteboard.     10/03/19 1043   Discharge Reassessment   Assessment Type Discharge Planning Reassessment   Discharge Plan A New Nursing Home placement - detention care facility   Discharge Plan B New Nursing Home placement - detention care facility   DME Needed Upon Discharge    (TBD)   Anticipated Discharge Disposition [facility]   How does the patient rate their overall health at the present time? Fair   Post-Acute Status   Post-Acute Authorization Placement   Post-Acute Placement Status Patient List Provided

## 2019-10-03 NOTE — PROGRESS NOTES
Dialysis complete.  Clotted with 10 minutes left of tx.  Unable to rinse back venous line.  Saunemin pulled from YOANNA fistula.  Pressure held x 5 minutes.  Hemostasis achieved.  Covered with gauze and paper tape.  +thrill +bruit.  Net UF 2L.  Tolerated well.  Transported from dialysis unit to room 8094 via stretcher by transporter.

## 2019-10-03 NOTE — PLAN OF CARE
POC reviewed with patient. Address questions and concerns. AAOX4. VVS. Patient belching/hiccups given medication as  Pending NH referrals. Safety maintained. Free from falls. Call light in reach. Bed alarm activated. WCTM

## 2019-10-03 NOTE — PROGRESS NOTES
[facility] Hospital Medicine  Progress Note    Patient Name: Vaughn Retana  MRN: [id number]  Patient Class: IP- Inpatient   Admission Date: 9/30/2019  Length of Stay: 3 days  Attending Physician: Lainey Wheat MD  Primary Care Provider: Primary Doctor [facility] Medicine Team: [facility] HOSP MED 3 Dilip Abbott MD    Subjective:     Principal Problem:Gastrointestinal hemorrhage with hematemesis        HPI:  Mr. Retana is a 53 yo AAM with ESRD on dialysis MWF, L below knee amputation 2/2 osteomyelitis, dCHF, GERD, h/o multiple ICH w/o residual deficits, who presented to [facility] ED with primary complaint of hematemesis. Patient is a poor historian and has limited knowledge of his medical history. During the interview the patient would not cooperate and provide much/any history. History is via ED physician and chart review.    Patient presenting to ED with a c/c of hematemesis x 2 episodes. Of note patient was evaluated in the ED yesterday (9/29) for a similar compliant. During that visit he was given IV reglan and similiarly failed to elaporate on his chief complaint and was discharged. Prior to this visit, patient was admitted to [facility] (9/20-9/23) for hematemesis, he received a total 2U PRBC and underwent endoscopy on 9/23 which showed some evidence of erosive esophagitis but no active or ongoing bleeding. At time of discharge Hg noted to be 10.2 (9/23) and today 8.7 (9/30).    Per chart review patient he was seen in the ED yesterday, and vomiting resolved with 1 dose of IM Reglan. He returned today (9/30) with persistent coffee ground emesis. He denies blood in his stool. Also denies hemoptysis. He is minimally responsive when asked about his symptoms and ROS. Last dialyzed today (M/W/F HD schedule).     In the ED patient is hemodynamically stable. PPI 80mg IV, 500cc bolus x 2 doses, type and screen, blood consent and evaluated by GI.       PREVIOUS HISTORY:     The patient was discharged from  the hospital on 9/6/19 for similar symptoms of abdominal pain with intractable nausea and vomiting and was evaluated by GI at that time as well. However, a repeat EGD was not done due to the patient being seen a [facility] earlier in August and having had an EGD done on 8/2 showing grade A esophagitis. A NM gastric emptying study was done during the last admission that was suggestive of gastroparesis retaining 50% of his gastric contents 4 hours after meals. He was placed on a PPI 40 mg BID, Reglan TID before meals, and Carafate BID and reports being compliant with his medications.     The following is a break down of his GI procedure history:    9/23/19- EGD [facility] - LA Grade D reflux esophagitis. Medium-sized hiatal hernia. No active GI bleed.    8/22/19 - EGD [facility] - LA grade A reflux esophagitis with no active bleeding     3/7/2019 - EGD [facility] - LA Grade D reflux esophagitis involving the entire esophagus  with a 2 cm sliding hiatal hernia. Congestive gastropathy, erythematous doudenopathy. No specimens collected     5/22/2018 - EGD [facility] - LA grade D reflux esophagitis with small hiatal hernia. Normal stomach and duodenum. No specimens collected     3/16/2018 - EGD [facility] - LA grade C reflux esophagitis with non-bleeding gastric ulcer with a clean base and a 4 cm hiatal hernia. 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 Normal duodenum     3/8/2018 - EGD [facility] - LA Grade B reflux esophagitis with a 3 cm hiatal hernia. Gastric polyp no biopsies. Normal duodenum     4/4/2017 - EGD [facility] -  LA Grade D reflux esophagitis with a small hiatal hernia. Normal stomach and duodenum     4/4/2017 - Colonoscopy [facility] - 3 mm polyp removed from the transverse colon pathology adenomatous. 8 mm polyp found in the sigmoid colon removed Pathology adenovillous. Suggestion to repeat in 3 years    Overview/Hospital Course:  Patient admitted for evaluation of his anemia and hematemesis.  GI  consulted agrees with conservative IV PPI plan.  They recently scoped him on 9/23 which showed a grade D esophagitis. Continue patient's PPI.  H/H dropped from 8.7 to 7.0 today.  NPO @ midnight with EGD scheduled for tomorrow morning.  Overnight, patient's Hgb continued to drop to 6.2, ordered 1x u pRBC.  EGD following morning was unrevealing for source of GI bleed.     Interval History:   NAEON. EGD no signs of bleeding. Patient denies hematemesis or melena. H/H remains stable.   Today patient is much more cooperative and talkative. Discussed placement with patient; nursing home; he agrees- family coming in today- will discuss with .      Objective:     Vital Signs (Most Recent):  Temp: 97.9 °F (36.6 °C) (10/03/19 1116)  Pulse: 91 (10/03/19 1116)  Resp: 17 (10/03/19 1116)  BP: 126/71 (10/03/19 1116)  SpO2: 100 % (10/03/19 1116) Vital Signs (24h Range):  Temp:  [96.3 °F (35.7 °C)-99.7 °F (37.6 °C)] 97.9 °F (36.6 °C)  Pulse:  [] 91  Resp:  [16-21] 17  SpO2:  [95 %-100 %] 100 %  BP: (118-157)/(61-87) 126/71        There is no height or weight on file to calculate BMI.    Intake/Output Summary (Last 24 hours) at 10/3/2019 1123  Last data filed at 10/3/2019 0852  Gross per 24 hour   Intake 1150 ml   Output 2500 ml   Net -1350 ml      Physical Exam   Constitutional: He is oriented to person, place, and time.   Patient is lying bed, appears alert, and in no acute distress.    HENT:   Head: Normocephalic and atraumatic.   Eyes: Pupils are equal, round, and reactive to light. EOM are normal.   Neck: Normal range of motion. No thyromegaly present.   Cardiovascular: Normal rate, regular rhythm and intact distal pulses.   Pulmonary/Chest: Effort normal and breath sounds normal. No respiratory distress.   Abdominal: Soft. Bowel sounds are normal. He exhibits no distension. There is no tenderness.   Musculoskeletal: Normal range of motion.   L BKA    Neurological: He is alert and oriented to person, place, and  time.   Psychiatric: He has a normal mood and affect.       Significant Labs: All pertinent labs within the past 24 hours have been reviewed.    Significant Imaging: I have reviewed all pertinent imaging results/findings within the past 24 hours.      Assessment/Plan:      * Gastrointestinal hemorrhage with hematemesis  Anemia of chronic disease and possibly superimposed acute GIB loss due to gastritis vs PUD vs esophagitis, vs MWT (in setting of vomiting).   No prior history of cancer or esophageal varices on multiple prior EGDs.   Previous EGD 9/23, LA Grade D reflux esophagitis. Medium-sized hiatal hernia. No active GI bleed.  Patient hemodynamically stable  EGD (10/2): unrevealing for any acute GI bleed.  Will consider further imaging to look for cause of decreased hemoglobin, possibly retroperitoneal per GI recommendations.      PLAN:  - Continue PPI 40mg BID,  - Type and cross, consent.   - Transfuse for Hg <7g/dl.   - Maintain 2 large bore IVs.   - GI consulted, appreciate recommendations  - patient's h/h stable; will decrease CBC q12          Gastroparesis  - c/w home reglan  - needs to have it 30 mins prior to meals     GERD with esophagitis  Continue PPI BID  H/o recurrent EGDs as noted above in HPI.   GI consulted      Chronic kidney disease-mineral and bone disorder  Continue renvela    History of Intracerebral Hemorrhage: L BG 5/2013; R BG 9/2016; R BG 11/2016; L caudate head 2/2017  Chronic, stable.   Continue to monitor.   Not currently on DAPT, or AC given h/o ICH/GIB.   PT/OT.    ESRD on hemodialysis  Consult nephrology for resumption of HD.   Dialyzes M/W/F.         Anemia in chronic kidney disease  -Refer to GI bleed       VTE Risk Mitigation (From admission, onward)         Ordered     IP VTE HIGH RISK PATIENT  Once      09/30/19 1944     Place MEL hose  Until discontinued      09/30/19 1944     Place sequential compression device  Until discontinued      09/30/19 1944                       Dilip Abbott MD  Department of Hospital Medicine   [facility]

## 2019-10-03 NOTE — TREATMENT PLAN
GI Treatment Plan    Vaughn Retana is a 54 y.o. male admitted to hospital 9/30/2019 (Hospital Day: 4) due to Gastrointestinal hemorrhage with hematemesis.     Interval History  - patient reports he is feeling well today, would like his breakfast  - denies abdominal pain, nausea or vomiting  - reports reflux and belching/hiccuping has significantly improved  - H&H declined to 7.1, currently stable    Objective  Temp:  [96.3 °F (35.7 °C)-99.7 °F (37.6 °C)] 96.3 °F (35.7 °C) (10/03 0757)  Pulse:  [] 99 (10/03 0757)  BP: (118-157)/(61-87) 122/76 (10/03 0757)  Resp:  [16-21] 18 (10/03 0757)  SpO2:  [95 %-100 %] 100 % (10/03 0757)    General: Alert, Oriented x3, no distress  Abdomen: Normoactive bowel sounds. Non-distended. Normal tympany. Soft. Non-tender. No peritoneal signs.  ALAN: light brown stool    Laboratory    Recent Labs   Lab 10/02/19  1756 10/02/19  2331 10/03/19  0554   HGB 8.6* 7.1* 7.2*       Lab Results   Component Value Date    WBC 5.87 10/03/2019    HGB 7.2 (L) 10/03/2019    HCT 23.5 (L) 10/03/2019     (H) 10/03/2019     10/03/2019       Lab Results   Component Value Date     10/03/2019    K 4.4 10/03/2019     (H) 10/03/2019    CO2 24 10/03/2019    BUN 16 10/03/2019    CREATININE 4.6 (H) 10/03/2019    CALCIUM 8.0 (L) 10/03/2019    ANIONGAP 5 (L) 10/03/2019    ESTGFRAFRICA 15.4 (A) 10/03/2019    EGFRNONAA 13.3 (A) 10/03/2019       Lab Results   Component Value Date    ALT 7 (L) 10/03/2019    AST 18 10/03/2019    ALKPHOS 134 10/03/2019    BILITOT 0.3 10/03/2019       Lab Results   Component Value Date    INR 1.0 10/01/2019    INR 1.1 09/30/2019    INR 1.0 09/21/2019       Plan  - s/p EGD with persistent esophagitis, likely due to medication non-compliance (had not filled prescription for PPI)  - protonix 40mg BID  - no signs of ongoing overt bleeding. He is due for follow-up of previous colon polyps, will schedule for outpatient scope  - call if any changes in patient's  clinical status  - see prior notes for additional recommendations  - Plan of care was discussed with primary team and attending  - We are signing-off. Please call with any questions.    Thank you for involving us in the care of Vaughn Retana. Please call with any additional questions, concerns or changes in the patient's clinical status.    Yimi aJcques MD  Gastroenterology Fellow  Spectralink: [phone]

## 2019-10-03 NOTE — PLAN OF CARE
Spoke with patient's niece, Daisy Longoria [phone], with choices for nursing home requests.   Nursing home referrals sent via [facility] to [facility], [facility] and [facility].

## 2019-10-04 NOTE — PROGRESS NOTES
NEPHROLOGY HEMODIALYSIS NOTE    Vaughn Retana is a 54 y.o. male currently on hemodialysis for removal of uremic toxins and volume management.     Patient seen and evaluated on hemodialysis, tolerating treatment, see HD flowsheet for vitals and assessments.    No Hypotension, chest pain, shortness of breath, cramping, nausea or vomiting.      Labs have been reviewed and the dialysate bath has been adjusted.    Labs:      Recent Labs   Lab 10/02/19  0610 10/03/19  0554 10/04/19  0340    141 139   K 3.5 4.4 4.4    112* 108   CO2 30* 24 22*   BUN 25* 16 27*   CREATININE 6.6* 4.6* 6.7*   CALCIUM 7.8* 8.0* 8.5*   PHOS 2.1* 1.5* 1.3*       Recent Labs   Lab 10/03/19  1127 10/03/19  2010 10/04/19  0843   WBC 7.59 6.21 5.93   HGB 8.1* 7.8* 7.7*   HCT 26.3* 24.1* 23.4*    210 186          Assessment/Plan:  - Seen on dialysis this morning, tolerating session with current UFR, no complications.    - Ultrafiltration goal: 2.5 liters  - Will continue dialysis treatments while in-patient  - Continue to monitor intake and output   - Renally dose medications  - Pre/Post dialysis treatment weights  - Phosphorus and albumin noted to be low, consider nutritional consult   - Renal diet  - Will continue to follow closely.    Trev Danielle MD  Nephrology and Hypertension Fellow    ATTENDING PHYSICIAN ATTESTATION  I have personally assessed and examined the patient. I thoroughly reviewed the demographic, clinical, laboratorial and imaging information available in medical records. I agree with the assessment and recommendations provided by the subspecialty resident who was under my supervision.     HEMODIALYSIS NOTE  Patient evaluated while undergoing hemodialysis indicated for ESRD. Tolerating session with current UFR, no complications.

## 2019-10-04 NOTE — PT/OT/SLP PROGRESS
Physical Therapy      Patient Name:  Vaughn Retana   MRN:  [id number]    Patient not seen today secondary to Patient unwilling to participate. Attempted to see pt at 1044 and he was JANET, attempted to see pt at 1414 and he refused d/t fatigue.Will follow-up as scheduled.    Beatriz Garcia, PT   10/4/2019

## 2019-10-04 NOTE — PLAN OF CARE
10/04/19 1146   Post-Acute Status   Post-Acute Authorization Placement   Post-Acute Placement Status Pending Payor Medical Review   Discharge Delays None known at this time     Patient accepted by [facility] per [facility].  Discharge pending on medical review for patient as well as bed availability/discharge date acceptance from [facility].      Update 12:03 PM:    Spoke with RUSTY Gibbons, he reported that he spoke with [facility] and they are currently trying to get in touch with patients family to continue NH process.    RUSTY will continue to follow.    Dean Cruz, MSW,LCSW

## 2019-10-04 NOTE — PROGRESS NOTES
Subjective:      Terry Bolaños is a 44 y.o. female presents for postop care following robotic assisted lap cholecystectomy with firefly on 7/20/2017   Appetite is good. Eating a regular diet without difficulty. Bowel movements are regular. The patient is voiding without difficulty. The patient is not having any pain. .    Patient has an advanced directive: NOt on file. Ms. Rody Quigley has a reminder for a \"due or due soon\" health maintenance. I have asked that she contact her primary care provider for follow-up on this health maintenance. Objective:     Visit Vitals    /78 (BP 1 Location: Left arm, BP Patient Position: Sitting)    Pulse 65    Temp 98.5 °F (36.9 °C) (Oral)    Resp 18    Ht 5' 4\" (1.626 m)    Wt 227 lb (103 kg)    LMP 07/20/2015    SpO2 98%    BMI 38.96 kg/m2       General:  alert, cooperative, no distress   Abdomen: soft, bowel sounds active, non-tender   Incision:   healing well, no drainage, no erythema, no hernia, no seroma, no swelling, no dehiscence, incision well approximated     Assessment:     Doing well postoperatively. Plan: 1. May increase activity as tolerated. 2. Follow up as needed  3. Path reviewed with patient. 4. May take Ibuprofen as needed for pain. Pt verbalized understanding and questions were answered to the best of my knowledge and ability. Transported from unit to [facility] on stretcher by transport.    Report given to Toñito Ascencio

## 2019-10-04 NOTE — ASSESSMENT & PLAN NOTE
Anemia of chronic disease and possibly superimposed acute GIB loss due to gastritis vs PUD vs esophagitis, vs MWT (in setting of vomiting).   No prior history of cancer or esophageal varices on multiple prior EGDs.   Previous EGD 9/23, LA Grade D reflux esophagitis. Medium-sized hiatal hernia. No active GI bleed.  Patient hemodynamically stable  EGD (10/2): unrevealing for any acute GI bleed.  Will consider further imaging to look for cause of decreased hemoglobin, possibly retroperitoneal per GI recommendations.      PLAN:  - Continue PPI 40mg BID and Carafate 0.5g BID, per GI recommendations until his outpatient follow up appointment.  - Patient to be discharged to nursing home for medication management.    - Upon discharge, patient to follow up in the GI clinic for additional endoscope and colonoscopy.   - Hemoglobin has stabilized.     - Type and cross, consent.   - Transfuse for Hg <7g/dl.   - Maintain 2 large bore IVs.   - GI consulted, appreciate recommendations  - patient's h/h stable; will decrease CBC q12

## 2019-10-04 NOTE — PLAN OF CARE
10/04/19 1420   Post-Acute Status   Post-Acute Authorization Placement   Post-Acute Placement Status Pending State Certification   Discharge Delays None known at this time     LOCET completed, waiting on 142.    Dean Cruz MSW,LCSW

## 2019-10-04 NOTE — PLAN OF CARE
POC reviewed with patient. Address questions and concerns. AAOX4. VVS. HD removed 2.5L. Accept at [facility]. TB Skin test done LFA. Tolerated meals well. No significant changes. Deny discomfort or pain. Safety maintained. Free from falls. Call light in reach. WCTM

## 2019-10-04 NOTE — ANESTHESIA POSTPROCEDURE EVALUATION
Anesthesia Post Evaluation    Patient: Vaughn Retana    Procedure(s) Performed: Procedure(s) (LRB):  EGD (ESOPHAGOGASTRODUODENOSCOPY) (N/A)    Final Anesthesia Type: general  Patient location during evaluation: PACU  Patient participation: Yes- Able to Participate  Level of consciousness: awake and alert and oriented  Post-procedure vital signs: reviewed and stable  Pain management: adequate  Airway patency: patent  PONV status at discharge: No PONV  Anesthetic complications: no      Cardiovascular status: stable  Respiratory status: unassisted, spontaneous ventilation and room air  Hydration status: euvolemic  Follow-up not needed.          Vitals Value Taken Time   /74 10/3/2019  3:56 PM   Temp 36.6 °C (97.8 °F) 10/3/2019  3:56 PM   Pulse 90 10/3/2019  3:56 PM   Resp 17 10/3/2019  3:56 PM   SpO2 100 % 10/3/2019  3:56 PM         No case tracking events are documented in the log.      Pain/Haseeb Score: No data recorded

## 2019-10-04 NOTE — PLAN OF CARE
Patient resting comfortably, No acute events overnight. Free of falls and injuries. Vitals stable. Will continue to monitor.

## 2019-10-04 NOTE — PROGRESS NOTES
tx ended, 2.5L removed during 3.5hr Tx. Tolerated well. Blood returned via RICHARD AVF, 15g needles removed x2. Gauze and tape applied, pressure held for 5mins, hemostasis acheived

## 2019-10-04 NOTE — SUBJECTIVE & OBJECTIVE
Interval History: NAEON.  Patient taken for dialysis today.  Changed Reglan to PRN.  Patient denies any hematemesis, hematochezia or cough.  Plan for discharge to NH tomorrow    Review of Systems   Constitutional: Negative for fatigue and fever.   HENT: Negative for congestion.    Eyes: Negative for visual disturbance.   Respiratory: Negative for cough and shortness of breath.    Cardiovascular: Negative for chest pain.   Gastrointestinal: Negative for abdominal pain.   Genitourinary: Negative for dysuria.   Skin: Negative for rash.   Neurological: Negative for headaches.     Objective:     Vital Signs (Most Recent):  Temp: 98 °F (36.7 °C) (10/04/19 1240)  Pulse: (!) 113 (10/04/19 1240)  Resp: 18 (10/04/19 1240)  BP: 126/73 (10/04/19 1240)  SpO2: 98 % (10/04/19 1240) Vital Signs (24h Range):  Temp:  [97.8 °F (36.6 °C)-98.6 °F (37 °C)] 98 °F (36.7 °C)  Pulse:  [] 113  Resp:  [16-20] 18  SpO2:  [98 %-100 %] 98 %  BP: (115-170)/(65-90) 126/73        There is no height or weight on file to calculate BMI.    Intake/Output Summary (Last 24 hours) at 10/4/2019 1244  Last data filed at 10/4/2019 1112  Gross per 24 hour   Intake 1250 ml   Output 3150 ml   Net -1900 ml      Physical Exam   Constitutional: He is oriented to person, place, and time.   Patient is lying bed, appears alert, and in no acute distress.    HENT:   Head: Normocephalic and atraumatic.   Eyes: Pupils are equal, round, and reactive to light. EOM are normal.   Neck: Normal range of motion. No thyromegaly present.   Cardiovascular: Normal rate, regular rhythm and intact distal pulses.   Pulmonary/Chest: Effort normal and breath sounds normal. No respiratory distress.   Abdominal: Soft. Bowel sounds are normal. He exhibits no distension. There is no tenderness.   Musculoskeletal: Normal range of motion.   L BKA    Neurological: He is alert and oriented to person, place, and time.   Psychiatric: He has a normal mood and affect.     Significant Labs: All  pertinent labs within the past 24 hours have been reviewed.     Recent Labs   Lab 10/02/19  0610  10/03/19  0554 10/03/19  1127 10/03/19  2010 10/04/19  0340 10/04/19  0843   WBC 12.93*   < > 5.87 7.59 6.21  --  5.93   HGB 7.6*   < > 7.2* 8.1* 7.8*  --  7.7*   HCT 24.4*   < > 23.5* 26.3* 24.1*  --  23.4*      < > 187 216 210  --  186   MCV 99*   < > 101* 102* 98  --  95   RDW 14.7*   < > 15.5* 15.8* 15.7*  --  15.5*     --  141  --   --  139  --    K 3.5  --  4.4  --   --  4.4  --      --  112*  --   --  108  --    CO2 30*  --  24  --   --  22*  --    BUN 25*  --  16  --   --  27*  --    CREATININE 6.6*  --  4.6*  --   --  6.7*  --    GLU 96  --  100  --   --  144*  --    PROT 5.3*  --  5.1*  --   --  5.7*  --    ALBUMIN 1.9*  --  1.8*  --   --  1.9*  --    BILITOT 0.9  --  0.3  --   --  0.3  --    AST 22  --  18  --   --  17  --    ALKPHOS 142*  --  134  --   --  142*  --    ALT 10  --  7*  --   --  7*  --     < > = values in this interval not displayed.       Significant Imaging: I have reviewed all pertinent imaging results/findings within the past 24 hours.

## 2019-10-04 NOTE — NURSING
To dialysis via stretcher with telemetry monitor. AAOX4. VVS.  Left leg prothesis on. No complaint voiced.

## 2019-10-04 NOTE — PROGRESS NOTES
[facility] Hospital Medicine  Progress Note    Patient Name: Vaughn Retana  MRN: [id number]  Patient Class: IP- Inpatient   Admission Date: 9/30/2019  Length of Stay: 4 days  Attending Physician: Titus Lantigua MD  Primary Care Provider: Primary Doctor [facility] Medicine Team: [facility] 3 Vince Castorena MD    Subjective:     Principal Problem:Gastrointestinal hemorrhage with hematemesis        HPI:  Mr. Retana is a 55 yo AAM with ESRD on dialysis MWF, L below knee amputation 2/2 osteomyelitis, dCHF, GERD, h/o multiple ICH w/o residual deficits, who presented [place] [facility] [place] with primary complaint of hematemesis. Patient is a poor historian and has limited knowledge of his medical history. During the interview the patient would not cooperate and provide much/any history. History is via ED physician and chart review.    Patient presenting to ED with a c/c of hematemesis x 2 episodes. Of note patient was evaluated in the ED yesterday (9/29) for a similar compliant. During that visit he was given IV reglan and similiarly failed to elaporate on his chief complaint and was discharged. Prior to this visit, patient was admitted [place] [facility] [place]9/20-9/23) for hematemesis, he received a total 2U PRBC and underwent endoscopy on 9/23 which showed some evidence of erosive esophagitis but no active or ongoing bleeding. At time of discharge Hg noted to be 10.2 (9/23) and today 8.7 (9/30).    Per chart review patient he was seen in the ED yesterday, and vomiting resolved with 1 dose of IM Reglan. He returned today (9/30) with persistent coffee ground emesis. He denies blood in his stool. Also denies hemoptysis. He is minimally responsive when asked about his symptoms and ROS. Last dialyzed today (M/W/F HD schedule).     In the ED patient is hemodynamically stable. PPI 80mg IV, 500cc bolus x 2 doses, type and screen, blood consent and evaluated by GI.       PREVIOUS HISTORY:     The patient was discharged  from the hospital on 9/6/19 for similar symptoms of abdominal pain with intractable nausea and vomiting and was evaluated by GI at that time as well. However, a repeat EGD was not done due to the patient being seen a [facility] earlier in August and having had an EGD done on 8/2 showing grade A esophagitis. A NM gastric emptying study was done during the last admission that was suggestive of gastroparesis retaining 50% of his gastric contents 4 hours after meals. He was placed on a PPI 40 mg BID, Reglan TID before meals, and Carafate BID and reports being compliant with his medications.     The following is a break down of his GI procedure history:    9/23/19- EGD [facility] - LA Grade D reflux esophagitis. Medium-sized hiatal hernia. No active GI bleed.    8/22/19 - EGD [facility] - LA grade A reflux esophagitis with no active bleeding     3/7/2019 - EGD [facility] - LA Grade D reflux esophagitis involving the entire esophagus  with a 2 cm sliding hiatal hernia. Congestive gastropathy, erythematous doudenopathy. No specimens collected     5/22/2018 - EGD [facility] - LA grade D reflux esophagitis with small hiatal hernia. Normal stomach and duodenum. No specimens collected     3/16/2018 - EGD [facility] - LA grade C reflux esophagitis with non-bleeding gastric ulcer with a clean base and a 4 cm hiatal hernia. 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 Normal duodenum     3/8/2018 - EGD [facility] - LA Grade B reflux esophagitis with a 3 cm hiatal hernia. Gastric polyp no biopsies. Normal duodenum     4/4/2017 - EGD [facility] -  LA Grade D reflux esophagitis with a small hiatal hernia. Normal stomach and duodenum     4/4/2017 - Colonoscopy [facility] - 3 mm polyp removed from the transverse colon pathology adenomatous. 8 mm polyp found in the sigmoid colon removed Pathology adenovillous. Suggestion to repeat in 3 years    Overview/Hospital Course:  Patient admitted for evaluation of his anemia and hematemesis.   GI consulted agrees with conservative IV PPI plan.  They recently scoped him on 9/23 which showed a grade D esophagitis. Continue patient's PPI.  H/H dropped from 8.7 to 7.0 today.  NPO @ midnight with EGD scheduled for tomorrow morning.  Overnight, patient's Hgb continued to drop to 6.2, ordered 1x u pRBC.  EGD following morning was unrevealing for source of GI bleed. His hemoglobin has stabilized around 8~.  Plan for discharge to nursing home for further medication management.  Patient to follow up in the GI clinic for scopes in several weeks.     Interval History: NAEON.  Patient taken for dialysis today.  Changed Reglan to PRN.  Patient denies any hematemesis, hematochezia or cough.  Plan for discharge to NH tomorrow    Review of Systems   Constitutional: Negative for fatigue and fever.   HENT: Negative for congestion.    Eyes: Negative for visual disturbance.   Respiratory: Negative for cough and shortness of breath.    Cardiovascular: Negative for chest pain.   Gastrointestinal: Negative for abdominal pain.   Genitourinary: Negative for dysuria.   Skin: Negative for rash.   Neurological: Negative for headaches.     Objective:     Vital Signs (Most Recent):  Temp: 98 °F (36.7 °C) (10/04/19 1240)  Pulse: (!) 113 (10/04/19 1240)  Resp: 18 (10/04/19 1240)  BP: 126/73 (10/04/19 1240)  SpO2: 98 % (10/04/19 1240) Vital Signs (24h Range):  Temp:  [97.8 °F (36.6 °C)-98.6 °F (37 °C)] 98 °F (36.7 °C)  Pulse:  [] 113  Resp:  [16-20] 18  SpO2:  [98 %-100 %] 98 %  BP: (115-170)/(65-90) 126/73        There is no height or weight on file to calculate BMI.    Intake/Output Summary (Last 24 hours) at 10/4/2019 1244  Last data filed at 10/4/2019 1112  Gross per 24 hour   Intake 1250 ml   Output 3150 ml   Net -1900 ml      Physical Exam   Constitutional: He is oriented to person, place, and time.   Patient is lying bed, appears alert, and in no acute distress.    HENT:   Head: Normocephalic and atraumatic.   Eyes: Pupils  are equal, round, and reactive to light. EOM are normal.   Neck: Normal range of motion. No thyromegaly present.   Cardiovascular: Normal rate, regular rhythm and intact distal pulses.   Pulmonary/Chest: Effort normal and breath sounds normal. No respiratory distress.   Abdominal: Soft. Bowel sounds are normal. He exhibits no distension. There is no tenderness.   Musculoskeletal: Normal range of motion.   L BKA    Neurological: He is alert and oriented to person, place, and time.   Psychiatric: He has a normal mood and affect.     Significant Labs: All pertinent labs within the past 24 hours have been reviewed.     Recent Labs   Lab 10/02/19  0610  10/03/19  0554 10/03/19  1127 10/03/19  2010 10/04/19  0340 10/04/19  0843   WBC 12.93*   < > 5.87 7.59 6.21  --  5.93   HGB 7.6*   < > 7.2* 8.1* 7.8*  --  7.7*   HCT 24.4*   < > 23.5* 26.3* 24.1*  --  23.4*      < > 187 216 210  --  186   MCV 99*   < > 101* 102* 98  --  95   RDW 14.7*   < > 15.5* 15.8* 15.7*  --  15.5*     --  141  --   --  139  --    K 3.5  --  4.4  --   --  4.4  --      --  112*  --   --  108  --    CO2 30*  --  24  --   --  22*  --    BUN 25*  --  16  --   --  27*  --    CREATININE 6.6*  --  4.6*  --   --  6.7*  --    GLU 96  --  100  --   --  144*  --    PROT 5.3*  --  5.1*  --   --  5.7*  --    ALBUMIN 1.9*  --  1.8*  --   --  1.9*  --    BILITOT 0.9  --  0.3  --   --  0.3  --    AST 22  --  18  --   --  17  --    ALKPHOS 142*  --  134  --   --  142*  --    ALT 10  --  7*  --   --  7*  --     < > = values in this interval not displayed.       Significant Imaging: I have reviewed all pertinent imaging results/findings within the past 24 hours.         Assessment/Plan:      * Gastrointestinal hemorrhage with hematemesis  Anemia of chronic disease and possibly superimposed acute GIB loss due to gastritis vs PUD vs esophagitis, vs MWT (in setting of vomiting).   No prior history of cancer or esophageal varices on multiple prior EGDs.    Previous EGD 9/23, LA Grade D reflux esophagitis. Medium-sized hiatal hernia. No active GI bleed.  Patient hemodynamically stable  EGD (10/2): unrevealing for any acute GI bleed.  Will consider further imaging to look for cause of decreased hemoglobin, possibly retroperitoneal per GI recommendations.      PLAN:  - Continue PPI 40mg BID and Carafate 0.5g BID, per GI recommendations until his outpatient follow up appointment.  - Patient to be discharged to nursing home for medication management.    - Upon discharge, patient to follow up in the GI clinic for additional endoscope and colonoscopy.   - Hemoglobin has stabilized.     - Type and cross, consent.   - Transfuse for Hg <7g/dl.   - Maintain 2 large bore IVs.   - GI consulted, appreciate recommendations  - patient's h/h stable; will decrease CBC q12          Gastroparesis  - changed reglan to PRN as patient denies any nausea/ vomiting      GERD with esophagitis  Continue PPI BID  H/o recurrent EGDs as noted above in HPI.   GI consulted      Chronic kidney disease-mineral and bone disorder  Held renvela in setting of low phosphorous     History of Intracerebral Hemorrhage: L BG 5/2013; R BG 9/2016; R BG 11/2016; L caudate head 2/2017  Chronic, stable.   Continue to monitor.   Not currently on DAPT, or AC given h/o ICH/GIB.   PT/OT.    ESRD on hemodialysis  Consult nephrology for resumption of HD.   Dialyzes M/W/F.         Anemia in chronic kidney disease  -Refer to GI bleed         VTE Risk Mitigation (From admission, onward)         Ordered     IP VTE HIGH RISK PATIENT  Once      09/30/19 1944     Place MEL hose  Until discontinued      09/30/19 1944     Place sequential compression device  Until discontinued      09/30/19 1944                      Vince Castorena MD  Department of Hospital Medicine   [facility]

## 2019-10-05 NOTE — PROGRESS NOTES
[facility] Hospital Medicine  Progress Note    Patient Name: Vaughn Retana  MRN: [id number]  Patient Class: IP- Inpatient   Admission Date: 9/30/2019  Length of Stay: 5 days  Attending Physician: Titus Lantigua MD  Primary Care Provider: Primary Doctor [facility] Medicine Team: [facility] 3 Vince Castorena MD    Subjective:     Principal Problem:Gastrointestinal hemorrhage with hematemesis        HPI:  Mr. Retana is a 53 yo AAM with ESRD on dialysis MWF, L below knee amputation 2/2 osteomyelitis, dCHF, GERD, h/o multiple ICH w/o residual deficits, who presented to [facility] ED with primary complaint of hematemesis. Patient is a poor historian and has limited knowledge of his medical history. During the interview the patient would not cooperate and provide much/any history. History is via ED physician and chart review.    Patient presenting to ED with a c/c of hematemesis x 2 episodes. Of note patient was evaluated in the ED yesterday (9/29) for a similar compliant. During that visit he was given IV reglan and similiarly failed to elaporate on his chief complaint and was discharged. Prior to this visit, patient was admitted to [facility] (9/20-9/23) for hematemesis, he received a total 2U PRBC and underwent endoscopy on 9/23 which showed some evidence of erosive esophagitis but no active or ongoing bleeding. At time of discharge Hg noted to be 10.2 (9/23) and today 8.7 (9/30).    Per chart review patient he was seen in the ED yesterday, and vomiting resolved with 1 dose of IM Reglan. He returned today (9/30) with persistent coffee ground emesis. He denies blood in his stool. Also denies hemoptysis. He is minimally responsive when asked about his symptoms and ROS. Last dialyzed today (M/W/F HD schedule).     In the ED patient is hemodynamically stable. PPI 80mg IV, 500cc bolus x 2 doses, type and screen, blood consent and evaluated by GI.       PREVIOUS HISTORY:     The patient was discharged  from the hospital on 9/6/19 for similar symptoms of abdominal pain with intractable nausea and vomiting and was evaluated by GI at that time as well. However, a repeat EGD was not done due to the patient being seen a [facility] earlier in August and having had an EGD done on 8/2 showing grade A esophagitis. A NM gastric emptying study was done during the last admission that was suggestive of gastroparesis retaining 50% of his gastric contents 4 hours after meals. He was placed on a PPI 40 mg BID, Reglan TID before meals, and Carafate BID and reports being compliant with his medications.     The following is a break down of his GI procedure history:    9/23/19- EGD [facility] - LA Grade D reflux esophagitis. Medium-sized hiatal hernia. No active GI bleed.    8/22/19 - EGD [facility] - LA grade A reflux esophagitis with no active bleeding     3/7/2019 - EGD [facility] - LA Grade D reflux esophagitis involving the entire esophagus  with a 2 cm sliding hiatal hernia. Congestive gastropathy, erythematous doudenopathy. No specimens collected     5/22/2018 - EGD [facility] - LA grade D reflux esophagitis with small hiatal hernia. Normal stomach and duodenum. No specimens collected     3/16/2018 - EGD [facility] - LA grade C reflux esophagitis with non-bleeding gastric ulcer with a clean base and a 4 cm hiatal hernia. 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 Normal duodenum     3/8/2018 - EGD [facility] - LA Grade B reflux esophagitis with a 3 cm hiatal hernia. Gastric polyp no biopsies. Normal duodenum     4/4/2017 - EGD [facility] -  LA Grade D reflux esophagitis with a small hiatal hernia. Normal stomach and duodenum     4/4/2017 - Colonoscopy [facility] - 3 mm polyp removed from the transverse colon pathology adenomatous. 8 mm polyp found in the sigmoid colon removed Pathology adenovillous. Suggestion to repeat in 3 years    Overview/Hospital Course:  Patient admitted for evaluation of his anemia and hematemesis.   GI consulted agrees with conservative IV PPI plan.  They recently scoped him on 9/23 which showed a grade D esophagitis. Continue patient's PPI.  H/H dropped from 8.7 to 7.0 today.  NPO @ midnight with EGD scheduled for tomorrow morning.  Overnight, patient's Hgb continued to drop to 6.2, ordered 1x u pRBC.  EGD following morning was unrevealing for source of GI bleed. His hemoglobin has stabilized around 8~.  Plan for discharge to nursing home for further medication management.  Patient to follow up in the GI clinic for f/u EGD in 8-12 weeks.     No new subjective & objective note has been filed under this hospital service since the last note was generated.      Assessment/Plan:      * Gastrointestinal hemorrhage with hematemesis  Anemia of chronic disease and possibly superimposed acute GIB loss due to gastritis vs PUD vs esophagitis, vs MWT (in setting of vomiting).   No prior history of cancer or esophageal varices on multiple prior EGDs.   Previous EGD 9/23, LA Grade D reflux esophagitis. Medium-sized hiatal hernia. No active GI bleed.  Patient hemodynamically stable  EGD (10/2): unrevealing for any acute GI bleed.  Will consider further imaging to look for cause of decreased hemoglobin, possibly retroperitoneal per GI recommendations.      PLAN:  - Continue PPI 40mg BID and Carafate 0.5g BID, per GI recommendations until his outpatient follow up appointment.  - Patient to be discharged to nursing home for medication management.    - Upon discharge, patient to follow up in the GI clinic for additional endoscope and colonoscopy.   - Hemoglobin has stabilized.     - Type and cross, consent.   - Transfuse for Hg <7g/dl.   - Maintain 2 large bore IVs.   - GI consulted, appreciate recommendations  - patient's h/h stable; will decrease CBC q12          Gastroparesis  - changed reglan to PRN as patient denies any nausea/ vomiting      GERD with esophagitis  Continue PPI BID  H/o recurrent EGDs as noted above in HPI.    GI consulted      Chronic kidney disease-mineral and bone disorder  Continue renvela    History of Intracerebral Hemorrhage: L BG 5/2013; R BG 9/2016; R BG 11/2016; L caudate head 2/2017  Chronic, stable.   Continue to monitor.   Not currently on DAPT, or AC given h/o ICH/GIB.   PT/OT.    ESRD on hemodialysis  Consult nephrology for resumption of HD.   Dialyzes M/W/F.         Anemia in chronic kidney disease  -Refer to GI bleed         VTE Risk Mitigation (From admission, onward)         Ordered     IP VTE HIGH RISK PATIENT  Once      09/30/19 1944     Place MEL hose  Until discontinued      09/30/19 1944     Place sequential compression device  Until discontinued      09/30/19 1944                      Vince Castorena MD  Department of Hospital Medicine   [facility]

## 2019-10-05 NOTE — PLAN OF CARE
Patient is AAOX4, VS stable. POC reviewed with patient. Pt slept most of the day except during meals. Safety maintained throughout shift, no acute changes. WCTM

## 2019-10-06 NOTE — PROGRESS NOTES
[facility] Hospital Medicine  Progress Note    Patient Name: Vaughn Retana  MRN: [id number]  Patient Class: IP- Inpatient   Admission Date: 9/30/2019  Length of Stay: 6 days  Attending Physician: Lainey Wheat MD  Primary Care Provider: [facility] Medicine Team: [facility] HOSP MED 3 Vince Castorena MD    Subjective:     Principal Problem:Gastrointestinal hemorrhage with hematemesis        HPI:  Mr. Retana is a 55 yo AAM with ESRD on dialysis MWF, L below knee amputation 2/2 osteomyelitis, dCHF, GERD, h/o multiple ICH w/o residual deficits, who presented to [facility] ED with primary complaint of hematemesis. Patient is a poor historian and has limited knowledge of his medical history. During the interview the patient would not cooperate and provide much/any history. History is via ED physician and chart review.    Patient presenting to ED with a c/c of hematemesis x 2 episodes. Of note patient was evaluated in the ED yesterday (9/29) for a similar compliant. During that visit he was given IV reglan and similiarly failed to elaporate on his chief complaint and was discharged. Prior to this visit, patient was admitted to [facility] (9/20-9/23) for hematemesis, he received a total 2U PRBC and underwent endoscopy on 9/23 which showed some evidence of erosive esophagitis but no active or ongoing bleeding. At time of discharge Hg noted to be 10.2 (9/23) and today 8.7 (9/30).    Per chart review patient he was seen in the ED yesterday, and vomiting resolved with 1 dose of IM Reglan. He returned today (9/30) with persistent coffee ground emesis. He denies blood in his stool. Also denies hemoptysis. He is minimally responsive when asked about his symptoms and ROS. Last dialyzed today (M/W/F HD schedule).     In the ED patient is hemodynamically stable. PPI 80mg IV, 500cc bolus x 2 doses, type and screen, blood consent and evaluated by GI.       PREVIOUS HISTORY:     The patient was discharged from the  hospital on 9/6/19 for similar symptoms of abdominal pain with intractable nausea and vomiting and was evaluated by GI at that time as well. However, a repeat EGD was not done due to the patient being seen a [facility] earlier in August and having had an EGD done on 8/2 showing grade A esophagitis. A NM gastric emptying study was done during the last admission that was suggestive of gastroparesis retaining 50% of his gastric contents 4 hours after meals. He was placed on a PPI 40 mg BID, Reglan TID before meals, and Carafate BID and reports being compliant with his medications.     The following is a break down of his GI procedure history:    9/23/19- EGD [facility] - LA Grade D reflux esophagitis. Medium-sized hiatal hernia. No active GI bleed.    8/22/19 - EGD [facility] - LA grade A reflux esophagitis with no active bleeding     3/7/2019 - EGD [facility] - LA Grade D reflux esophagitis involving the entire esophagus  with a 2 cm sliding hiatal hernia. Congestive gastropathy, erythematous doudenopathy. No specimens collected     5/22/2018 - EGD [facility] - LA grade D reflux esophagitis with small hiatal hernia. Normal stomach and duodenum. No specimens collected     3/16/2018 - EGD [facility] - LA grade C reflux esophagitis with non-bleeding gastric ulcer with a clean base and a 4 cm hiatal hernia. 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 Normal duodenum     3/8/2018 - EGD [facility] - LA Grade B reflux esophagitis with a 3 cm hiatal hernia. Gastric polyp no biopsies. Normal duodenum     4/4/2017 - EGD [facility] -  LA Grade D reflux esophagitis with a small hiatal hernia. Normal stomach and duodenum     4/4/2017 - Colonoscopy [facility] - 3 mm polyp removed from the transverse colon pathology adenomatous. 8 mm polyp found in the sigmoid colon removed Pathology adenovillous. Suggestion to repeat in 3 years    Overview/Hospital Course:  Patient admitted for evaluation of his anemia and hematemesis.  GI  consulted agrees with conservative IV PPI plan.  They recently scoped him on 9/23 which showed a grade D esophagitis. Continue patient's PPI.  H/H dropped from 8.7 to 7.0 today.  NPO @ midnight with EGD scheduled for tomorrow morning.  Overnight, patient's Hgb continued to drop to 6.2, ordered 1x u pRBC.  EGD following morning was unrevealing for source of GI bleed. His hemoglobin has stabilized around 8~.  Plan for discharge to nursing home for further medication management.  Patient to follow up in the GI clinic for f/u EGD in 8-12 weeks.     Interval History: NAEON  Patient noted to have coffee ground emesis in the late afternoon. Gave 1x dose of zofran and ordered a new CBC to follow up.     Review of Systems   Constitutional: Negative for fatigue and fever.   HENT: Negative for congestion.    Eyes: Negative for visual disturbance.   Respiratory: Negative for cough and shortness of breath.    Cardiovascular: Negative for chest pain.   Gastrointestinal: Negative for abdominal pain.   Genitourinary: Negative for dysuria.   Skin: Negative for rash.   Neurological: Negative for headaches.     Objective:     Vital Signs (Most Recent):  Temp: 97.9 °F (36.6 °C) (10/06/19 1630)  Pulse: 84 (10/06/19 1630)  Resp: 18 (10/06/19 1630)  BP: 121/65 (10/06/19 1630)  SpO2: 100 % (10/06/19 1630) Vital Signs (24h Range):  Temp:  [97.5 °F (36.4 °C)-97.9 °F (36.6 °C)] 97.9 °F (36.6 °C)  Pulse:  [] 84  Resp:  [16-18] 18  SpO2:  [96 %-100 %] 100 %  BP: (121-135)/(65-94) 121/65     Weight: 58.8 kg (129 lb 10.1 oz)  Body mass index is 20.92 kg/m².    Intake/Output Summary (Last 24 hours) at 10/6/2019 1807  Last data filed at 10/6/2019 0800  Gross per 24 hour   Intake 240 ml   Output --   Net 240 ml      Physical Exam   Constitutional: He is oriented to person, place, and time.   Patient is lying bed, appears alert, and in no acute distress.    HENT:   Head: Normocephalic and atraumatic.   Eyes: Pupils are equal, round, and  reactive to light. EOM are normal.   Neck: Normal range of motion. No thyromegaly present.   Cardiovascular: Normal rate, regular rhythm and intact distal pulses.   Pulmonary/Chest: Effort normal and breath sounds normal. No respiratory distress.   Abdominal: Soft. Bowel sounds are normal. He exhibits no distension. There is no tenderness.   Musculoskeletal: Normal range of motion.   L BKA    Neurological: He is alert and oriented to person, place, and time.   Psychiatric: He has a normal mood and affect.     Significant Labs: All pertinent labs within the past 24 hours have been reviewed.     Recent Labs   Lab 10/03/19  0554 10/03/19  1127 10/03/19  2010 10/04/19  0340 10/04/19  0843 10/05/19  0756   WBC 5.87 7.59 6.21  --  5.93  --    HGB 7.2* 8.1* 7.8*  --  7.7*  --    HCT 23.5* 26.3* 24.1*  --  23.4*  --     216 210  --  186  --    * 102* 98  --  95  --    RDW 15.5* 15.8* 15.7*  --  15.5*  --      --   --  139  --  138   K 4.4  --   --  4.4  --  4.6   *  --   --  108  --  107   CO2 24  --   --  22*  --  24   BUN 16  --   --  27*  --  22*   CREATININE 4.6*  --   --  6.7*  --  5.2*     --   --  144*  --  96   PROT 5.1*  --   --  5.7*  --  5.6*   ALBUMIN 1.8*  --   --  1.9*  --  1.9*   BILITOT 0.3  --   --  0.3  --  0.3   AST 18  --   --  17  --  19   ALKPHOS 134  --   --  142*  --  136*   ALT 7*  --   --  7*  --  8*       Significant Imaging: I have reviewed all pertinent imaging results/findings within the past 24 hours.         Assessment/Plan:      * Gastrointestinal hemorrhage with hematemesis  Anemia of chronic disease and possibly superimposed acute GIB loss due to gastritis vs PUD vs esophagitis, vs MWT (in setting of vomiting).   No prior history of cancer or esophageal varices on multiple prior EGDs.   Previous EGD 9/23, LA Grade D reflux esophagitis. Medium-sized hiatal hernia. No active GI bleed.  Patient hemodynamically stable  EGD (10/2): unrevealing for any acute GI  bleed.  Will consider further imaging to look for cause of decreased hemoglobin, possibly retroperitoneal per GI recommendations.      PLAN:  - Continue PPI 40mg BID and Carafate 0.5g BID, per GI recommendations until his outpatient follow up appointment.  - Patient to be discharged to nursing home for medication management.    - Upon discharge, patient to follow up in the GI clinic for additional endoscope and colonoscopy in 8-12 weeks.   - Hemoglobin has stabilized.     - Type and cross, consented.   - Transfuse for Hg <7g/dl.   - Maintain 2 large bore IVs.   - GI consulted, appreciate recommendations  - patient's h/h stable; stopped labs          Gastroparesis  - changed reglan to PRN as patient denies any nausea/ vomiting      GERD with esophagitis  Continue PPI BID  H/o recurrent EGDs as noted above in HPI.   GI consulted      Chronic kidney disease-mineral and bone disorder  Continue renvela    History of Intracerebral Hemorrhage: L BG 5/2013; R BG 9/2016; R BG 11/2016; L caudate head 2/2017  Chronic, stable.   Continue to monitor.   Not currently on DAPT, or AC given h/o ICH/GIB.   PT/OT.    ESRD on hemodialysis  Consult nephrology for resumption of HD.   Dialyzes M/W/F.         Anemia in chronic kidney disease  -Refer to GI bleed         VTE Risk Mitigation (From admission, onward)         Ordered     IP VTE HIGH RISK PATIENT  Once      09/30/19 1944     Place MEL hose  Until discontinued      09/30/19 1944     Place sequential compression device  Until discontinued      09/30/19 1944                      Vince Castorena MD  Department of Hospital Medicine   [facility]

## 2019-10-06 NOTE — ASSESSMENT & PLAN NOTE
Anemia of chronic disease and possibly superimposed acute GIB loss due to gastritis vs PUD vs esophagitis, vs MWT (in setting of vomiting).   No prior history of cancer or esophageal varices on multiple prior EGDs.   Previous EGD 9/23, LA Grade D reflux esophagitis. Medium-sized hiatal hernia. No active GI bleed.  Patient hemodynamically stable  EGD (10/2): unrevealing for any acute GI bleed.  Will consider further imaging to look for cause of decreased hemoglobin, possibly retroperitoneal per GI recommendations.      PLAN:  - Continue PPI 40mg BID and Carafate 0.5g BID, per GI recommendations until his outpatient follow up appointment.  - Patient to be discharged to nursing home for medication management.    - Upon discharge, patient to follow up in the GI clinic for additional endoscope and colonoscopy in 8-12 weeks.   - Hemoglobin has stabilized.     - Type and cross, consented.   - Transfuse for Hg <7g/dl.   - Maintain 2 large bore IVs.   - GI consulted, appreciate recommendations  - patient's h/h stable; stopped labs

## 2019-10-06 NOTE — SUBJECTIVE & OBJECTIVE
Interval History: ALLAON  Patient noted to have coffee ground emesis in the late afternoon. Gave 1x dose of zofran and ordered a new CBC to follow up.     Review of Systems   Constitutional: Negative for fatigue and fever.   HENT: Negative for congestion.    Eyes: Negative for visual disturbance.   Respiratory: Negative for cough and shortness of breath.    Cardiovascular: Negative for chest pain.   Gastrointestinal: Negative for abdominal pain.   Genitourinary: Negative for dysuria.   Skin: Negative for rash.   Neurological: Negative for headaches.     Objective:     Vital Signs (Most Recent):  Temp: 97.9 °F (36.6 °C) (10/06/19 1630)  Pulse: 84 (10/06/19 1630)  Resp: 18 (10/06/19 1630)  BP: 121/65 (10/06/19 1630)  SpO2: 100 % (10/06/19 1630) Vital Signs (24h Range):  Temp:  [97.5 °F (36.4 °C)-97.9 °F (36.6 °C)] 97.9 °F (36.6 °C)  Pulse:  [] 84  Resp:  [16-18] 18  SpO2:  [96 %-100 %] 100 %  BP: (121-135)/(65-94) 121/65     Weight: 58.8 kg (129 lb 10.1 oz)  Body mass index is 20.92 kg/m².    Intake/Output Summary (Last 24 hours) at 10/6/2019 1807  Last data filed at 10/6/2019 0800  Gross per 24 hour   Intake 240 ml   Output --   Net 240 ml      Physical Exam   Constitutional: He is oriented to person, place, and time.   Patient is lying bed, appears alert, and in no acute distress.    HENT:   Head: Normocephalic and atraumatic.   Eyes: Pupils are equal, round, and reactive to light. EOM are normal.   Neck: Normal range of motion. No thyromegaly present.   Cardiovascular: Normal rate, regular rhythm and intact distal pulses.   Pulmonary/Chest: Effort normal and breath sounds normal. No respiratory distress.   Abdominal: Soft. Bowel sounds are normal. He exhibits no distension. There is no tenderness.   Musculoskeletal: Normal range of motion.   L BKA    Neurological: He is alert and oriented to person, place, and time.   Psychiatric: He has a normal mood and affect.     Significant Labs: All pertinent labs within  the past 24 hours have been reviewed.     Recent Labs   Lab 10/03/19  0554 10/03/19  1127 10/03/19  2010 10/04/19  0340 10/04/19  0843 10/05/19  0756   WBC 5.87 7.59 6.21  --  5.93  --    HGB 7.2* 8.1* 7.8*  --  7.7*  --    HCT 23.5* 26.3* 24.1*  --  23.4*  --     216 210  --  186  --    * 102* 98  --  95  --    RDW 15.5* 15.8* 15.7*  --  15.5*  --      --   --  139  --  138   K 4.4  --   --  4.4  --  4.6   *  --   --  108  --  107   CO2 24  --   --  22*  --  24   BUN 16  --   --  27*  --  22*   CREATININE 4.6*  --   --  6.7*  --  5.2*     --   --  144*  --  96   PROT 5.1*  --   --  5.7*  --  5.6*   ALBUMIN 1.8*  --   --  1.9*  --  1.9*   BILITOT 0.3  --   --  0.3  --  0.3   AST 18  --   --  17  --  19   ALKPHOS 134  --   --  142*  --  136*   ALT 7*  --   --  7*  --  8*       Significant Imaging: I have reviewed all pertinent imaging results/findings within the past 24 hours.

## 2019-10-06 NOTE — PLAN OF CARE
VSS. A/Ox4.  No complaints of pain.  No acute events overnight. Safety maintained.  All questions answered. Patient updated on plan of care.  Will continue to monitor.

## 2019-10-06 NOTE — PLAN OF CARE
Patient AAOX4, VS stable. POC reviewed with patient. Patient slept majority of the day, except during meal times. Patient vomited brown emesis later in the day, Zofran administered and MD informed, CBC ordered. Safety maintained throughout shift, no acute changes. WCTM

## 2019-10-07 NOTE — PROGRESS NOTES
Tx end  29  Minutes early per  Dr. Cavanaugh. BP low.  Removed 270ml.  Removed 2 needles from [facility] AVF. Pressure applied until hemostasis. Blood still in process.

## 2019-10-07 NOTE — PLAN OF CARE
"VSS. A/Ox4.  No complaints of pain.  Patient had rough start to shift.  Patient was nauseated and vomiting brown emesis.  He had been vomiting since 1730 off and on until 1930 Reglan PO and compazine PO were given.  Soon after PRN meds were given patient felt better with no nausea nor hiccups.  Then tele called and stated patient HR was sustaining in the 130s.  Patient did not feel this heart rate and felt "just fine. A lot better than at the start."  Med team was notified and a 250mL bolus was given to see if his rate would respond.  Ten minutes after bolus patient HR started to decrease but would occasionally jump back up into the low 120s.  MD notified and another 250mL bolus was given.  Patient HR finally came down and stayed in the 90-100s the rest of the night.  Patient was asymptomatic the entire time.  Plan for dialysis in the AM.  Safety maintained.  All questions answered. Patient updated on plan of care.  Will continue to monitor.    "

## 2019-10-07 NOTE — CARE UPDATE
Rapid Response Nurse Follow-up Note     Followed up with patient for proactive rounding.   No acute issues at this time. Reviewed plan of care with primary RN, Mike.     Please call Rapid Response RN, Travis Hartley RN with any questions or concerns at [phone].

## 2019-10-07 NOTE — PROGRESS NOTES
[facility] Hospital Medicine  Progress Note    Patient Name: Vaughn Retana  MRN: [id number]  Patient Class: IP- Inpatient   Admission Date: 9/30/2019  Length of Stay: 7 days  Attending Physician: Lainey Wheat MD  Primary Care Provider: [facility] Medicine Team: [facility] HOSP MED 3 Ilia Payan MD    Subjective:     Principal Problem:Gastrointestinal hemorrhage with hematemesis        HPI:  Mr. Retana is a 53 yo AAM with ESRD on dialysis MWF, L below knee amputation 2/2 osteomyelitis, dCHF, GERD, h/o multiple ICH w/o residual deficits, who presented to [facility] ED with primary complaint of hematemesis. Patient is a poor historian and has limited knowledge of his medical history. During the interview the patient would not cooperate and provide much/any history. History is via ED physician and chart review.    Patient presenting to ED with a c/c of hematemesis x 2 episodes. Of note patient was evaluated in the ED yesterday (9/29) for a similar compliant. During that visit he was given IV reglan and similiarly failed to elaporate on his chief complaint and was discharged. Prior to this visit, patient was admitted to [facility] (9/20-9/23) for hematemesis, he received a total 2U PRBC and underwent endoscopy on 9/23 which showed some evidence of erosive esophagitis but no active or ongoing bleeding. At time of discharge Hg noted to be 10.2 (9/23) and today 8.7 (9/30).    Per chart review patient he was seen in the ED yesterday, and vomiting resolved with 1 dose of IM Reglan. He returned today (9/30) with persistent coffee ground emesis. He denies blood in his stool. Also denies hemoptysis. He is minimally responsive when asked about his symptoms and ROS. Last dialyzed today (M/W/F HD schedule).     In the ED patient is hemodynamically stable. PPI 80mg IV, 500cc bolus x 2 doses, type and screen, blood consent and evaluated by GI.       PREVIOUS HISTORY:     The patient was discharged  from the hospital on 9/6/19 for similar symptoms of abdominal pain with intractable nausea and vomiting and was evaluated by GI at that time as well. However, a repeat EGD was not done due to the patient being seen a [facility] earlier in August and having had an EGD done on 8/2 showing grade A esophagitis. A NM gastric emptying study was done during the last admission that was suggestive of gastroparesis retaining 50% of his gastric contents 4 hours after meals. He was placed on a PPI 40 mg BID, Reglan TID before meals, and Carafate BID and reports being compliant with his medications.     The following is a break down of his GI procedure history:    9/23/19- EGD [facility] - LA Grade D reflux esophagitis. Medium-sized hiatal hernia. No active GI bleed.    8/22/19 - EGD [facility] - LA grade A reflux esophagitis with no active bleeding     3/7/2019 - EGD [facility] - LA Grade D reflux esophagitis involving the entire esophagus  with a 2 cm sliding hiatal hernia. Congestive gastropathy, erythematous doudenopathy. No specimens collected     5/22/2018 - EGD [facility] - LA grade D reflux esophagitis with small hiatal hernia. Normal stomach and duodenum. No specimens collected     3/16/2018 - EGD [facility] - LA grade C reflux esophagitis with non-bleeding gastric ulcer with a clean base and a 4 cm hiatal hernia. 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 Normal duodenum     3/8/2018 - EGD [facility] - LA Grade B reflux esophagitis with a 3 cm hiatal hernia. Gastric polyp no biopsies. Normal duodenum     4/4/2017 - EGD [facility] -  LA Grade D reflux esophagitis with a small hiatal hernia. Normal stomach and duodenum     4/4/2017 - Colonoscopy [facility] - 3 mm polyp removed from the transverse colon pathology adenomatous. 8 mm polyp found in the sigmoid colon removed Pathology adenovillous. Suggestion to repeat in 3 years    Overview/Hospital Course:  Patient admitted for evaluation of his anemia and hematemesis.   GI consulted agrees with conservative IV PPI plan.  They recently scoped him on 9/23 which showed a grade D esophagitis. Continue patient's PPI.  H/H dropped from 8.7 to 7.0 today.  NPO @ midnight with EGD scheduled for tomorrow morning.  Overnight, patient's Hgb continued to drop to 6.2, ordered 1x u pRBC.  EGD following morning was unrevealing for source of GI bleed. His hemoglobin has stabilized around 8~.  Plan for discharge to nursing home for further medication management.  Prior to DC patient w/ reported coffee ground emesis. Subsequent AM Hgb dropped from 7.2 to 6.1. Patient transfused 2U pRBCsPatient to follow up in the GI clinic for f/u EGD in 8-12 weeks.     Interval History: Patient w/ Hgb from from 7.2 to 6.1. Patient denies any SOB, syncope, or chest pain. Patient mildly tachycardic.      Review of Systems   Constitutional: Negative for fatigue and fever.   HENT: Negative for congestion and sore throat.    Eyes: Negative for visual disturbance.   Respiratory: Negative for cough and shortness of breath.    Cardiovascular: Negative for chest pain.   Gastrointestinal: Positive for vomiting (coffee ground emesis). Negative for abdominal pain and nausea.   Genitourinary: Negative for dysuria.   Skin: Negative for rash.   Neurological: Negative for syncope and headaches.   Psychiatric/Behavioral: Negative for agitation and confusion.     Objective:     Vital Signs (Most Recent):  Temp: 98.6 °F (37 °C) (10/07/19 1801)  Pulse: 101 (10/07/19 1801)  Resp: 16 (10/07/19 1801)  BP: 128/71 (10/07/19 1801)  SpO2: 98 % (10/07/19 1801) Vital Signs (24h Range):  Temp:  [97.7 °F (36.5 °C)-98.8 °F (37.1 °C)] 98.6 °F (37 °C)  Pulse:  [] 101  Resp:  [14-19] 16  SpO2:  [98 %-100 %] 98 %  BP: ()/(35-86) 128/71     Weight: 58.8 kg (129 lb 10.1 oz)  Body mass index is 20.92 kg/m².    Intake/Output Summary (Last 24 hours) at 10/7/2019 1829  Last data filed at 10/7/2019 1800  Gross per 24 hour   Intake 1666 ml    Output 1120 ml   Net 546 ml      Physical Exam   Constitutional: He is oriented to person, place, and time. He appears well-developed and well-nourished.   Patient is lying bed, appears alert, and in no acute distress.    HENT:   Head: Normocephalic and atraumatic.   Eyes: Pupils are equal, round, and reactive to light. EOM are normal.   Neck: Normal range of motion. No thyromegaly present.   Cardiovascular: Normal rate, regular rhythm and intact distal pulses.   Pulmonary/Chest: Effort normal and breath sounds normal. No respiratory distress.   Abdominal: Soft. Bowel sounds are normal. He exhibits no distension. There is no tenderness.   Musculoskeletal: Normal range of motion.   L BKA    Neurological: He is alert and oriented to person, place, and time.   Skin: Skin is warm and dry.   Psychiatric: He has a normal mood and affect. His behavior is normal.   Vitals reviewed.    Significant Labs: All pertinent labs within the past 24 hours have been reviewed.     Recent Labs   Lab 10/03/19  0554  10/04/19  0340 10/04/19  0843 10/05/19  0756 10/06/19  1857 10/07/19  0802 10/07/19  0803   WBC 5.87   < >  --  5.93  --  9.86 6.11  --    HGB 7.2*   < >  --  7.7*  --  7.2* 6.1*  --    HCT 23.5*   < >  --  23.4*  --  22.3* 18.6*  --       < >  --  186  --  245 218  --    *   < >  --  95  --  97 98  --    RDW 15.5*   < >  --  15.5*  --  16.0* 16.3*  --      --  139  --  138  --   --  136   K 4.4  --  4.4  --  4.6  --   --  4.5   *  --  108  --  107  --   --  102   CO2 24  --  22*  --  24  --   --  26   BUN 16  --  27*  --  22*  --   --  58*   CREATININE 4.6*  --  6.7*  --  5.2*  --   --  9.0*     --  144*  --  96  --   --  96   PROT 5.1*  --  5.7*  --  5.6*  --   --   --    ALBUMIN 1.8*  --  1.9*  --  1.9*  --   --  1.8*   BILITOT 0.3  --  0.3  --  0.3  --   --   --    AST 18  --  17  --  19  --   --   --    ALKPHOS 134  --  142*  --  136*  --   --   --    ALT 7*  --  7*  --  8*  --   --    --     < > = values in this interval not displayed.       Significant Imaging: I have reviewed all pertinent imaging results/findings within the past 24 hours.         Assessment/Plan:      * Gastrointestinal hemorrhage with hematemesis  Anemia of chronic disease and possibly superimposed acute GIB loss due to gastritis vs PUD vs esophagitis, vs MWT (in setting of vomiting).   No prior history of cancer or esophageal varices on multiple prior EGDs.   Previous EGD 9/23, LA Grade D reflux esophagitis. Medium-sized hiatal hernia. No active GI bleed.  Patient hemodynamically stable  EGD (10/2): unrevealing for any acute GI bleed.  Will consider further imaging to look for cause of decreased hemoglobin, possibly retroperitoneal per GI recommendations.   10/06 Hgb drop to 6.1     PLAN:  - Continue PPI 40mg BID and Carafate 0.5g BID, per GI recommendations until his outpatient follow up appointment.  - Patient to be discharged to nursing home for medication management.    - Upon discharge, patient to follow up in the GI clinic for additional endoscope and colonoscopy in 8-12 weeks.   - Hemoglobin has stabilized.     - Type and cross, consented.   - Transfuse for Hg <7g/dl.   - Maintain 2 large bore IVs.   - GI consulted, appreciate recommendations          Gastroparesis  - changed reglan to PRN as patient denies any nausea/ vomiting      GERD with esophagitis  Continue PPI BID  H/o recurrent EGDs as noted above in HPI.   GI consulted      Chronic kidney disease-mineral and bone disorder  Continue renvela    History of Intracerebral Hemorrhage: L BG 5/2013; R BG 9/2016; R BG 11/2016; L caudate head 2/2017  Chronic, stable.   Continue to monitor.   Not currently on DAPT, or AC given h/o ICH/GIB.   PT/OT.    ESRD on hemodialysis  Consult nephrology for resumption of HD.   Dialyzes M/W/F.         Anemia in chronic kidney disease  -Refer to GI bleed         VTE Risk Mitigation (From admission, onward)         Ordered     IP VTE  HIGH RISK PATIENT  Once      09/30/19 1944     Place MEL hose  Until discontinued      09/30/19 1944     Place sequential compression device  Until discontinued      09/30/19 1944                      Ilia Payan MD  Department of Hospital Medicine   [facility]

## 2019-10-07 NOTE — CODE/ RAPID DOCUMENTATION
Rapid Response Nurse Follow-up Note     Followed up with patient for proactive rounding.   No acute issues at this time. Reviewed plan of care with primary RNElvira via phone. Patient is receiving first of 2 units PRBC's at this time after becoming hypotensive in HD this morning. RN voices no concerns at this time .    Please call Rapid Response RN, Kimberly Ugarte RN with any questions or concerns at [phone].

## 2019-10-07 NOTE — PROGRESS NOTES
Arrived to dialysis via stretcher.    AAO x 3. Reoriented  To time. Agreed to 3.5 hour Hd Tx.  UF goal 1L. Cannulation to RICHARD AVF.

## 2019-10-07 NOTE — PROGRESS NOTES
NEPHROLOGY HEMODIALYSIS NOTE    Vaughn Retana is a 54 y.o. male currently on hemodialysis for removal of uremic toxins and volume management.     Patient seen and evaluated on hemodialysis, tolerating treatment, see HD flowsheet for vitals and assessments.    Was hypotensive into the 70s systolic, HD session terminated early.      Labs have been reviewed and the dialysate bath has been adjusted.    Labs:      Recent Labs   Lab 10/04/19  0340 10/05/19  0756 10/07/19  0803    138 136   K 4.4 4.6 4.5    107 102   CO2 22* 24 26   BUN 27* 22* 58*   CREATININE 6.7* 5.2* 9.0*   CALCIUM 8.5* 8.6* 9.0   PHOS 1.3* 1.9* 1.6*       Recent Labs   Lab 10/04/19  0843 10/06/19  1857 10/07/19  0802   WBC 5.93 9.86 6.11   HGB 7.7* 7.2* 6.1*   HCT 23.4* 22.3* 18.6*    245 218          Assessment/Plan:  - Seen on dialysis this morning, tolerating session with current UFR, no complications.    - Ultrafiltration goal: no net UF due to anemia and hypotension  - Will continue dialysis treatments while in-patient  - Continue to monitor intake and output   - Renally dose medications  - Pre/Post dialysis treatment weights  - Hgb at 6.1 today, primary team ordered 2 units of PRBCs to be given after HD  - Renal diet  - Will continue to follow closely.    Trev Danielle MD  Nephrology and Hypertension Fellow    ATTENDING PHYSICIAN ATTESTATION  I have personally assessed and examined the patient. I thoroughly reviewed the demographic, clinical, laboratorial and imaging information available in medical records. I agree with the assessment and recommendations provided by the subspecialty resident who was under my supervision.     HEMODIALYSIS NOTE  Patient evaluated while undergoing hemodialysis indicated for ESRD. Tolerating session with current UFR, no complications.

## 2019-10-07 NOTE — PLAN OF CARE
CM at bedside to see patient .  Possible rehab placement pending medical clearance.  Explained CM team continuing to follow the patient throughout the hospital admission for discharge needs and assistance.  CM name, phone # and anticipated D/C date updated on whiteboard.     10/07/19 1015   Discharge Reassessment   Assessment Type Discharge Planning Reassessment   Provided patient/caregiver education on the expected discharge date and the discharge plan Yes   Discharge Plan A Rehab   Discharge Plan B Rehab   DME Needed Upon Discharge    (TBD)   Anticipated Discharge Disposition Rehab   Can the patient answer the patient profile reliably? Yes, cognitively intact   How does the patient rate their overall health at the present time? Fair   Post-Acute Status   Post-Acute Authorization Placement   Post-Acute Placement Status Awaiting Internal Medical Clearance

## 2019-10-07 NOTE — PLAN OF CARE
CM at bedside to see patient .  Accepted to [facility].   Awaiting medical clearance and note for PPD reading.   Explained CM team continuing to follow the patient throughout the hospital admission for discharge needs and assistance.  CM name, phone # and anticipated D/C date updated on whiteboard.     10/07/19 0939   Discharge Reassessment   Assessment Type Discharge Planning Reassessment   Provided patient/caregiver education on the expected discharge date and the discharge plan Yes   Discharge Plan A New Nursing Home placement - residential care facility  ([facility])   Discharge Plan B New Nursing Home placement - residential care facility   DME Needed Upon Discharge  none   Anticipated Discharge Disposition [facility]   Can the patient answer the patient profile reliably? Yes, cognitively intact   Post-Acute Status   Post-Acute Authorization Placement   Post-Acute Placement Status Awaiting Internal Medical Clearance

## 2019-10-07 NOTE — NURSING
Patient arrived back from dialysis at this time with transporter x1. Patient is awake alert and oriented x4. Denies pain or discomfort. No distress noted. Will continue to monitor.

## 2019-10-07 NOTE — PT/OT/SLP PROGRESS
Physical Therapy  Pt Not Seen    Patient Name:  Vaughn Retana   MRN:  [id number]    Patient not seen today secondary to pt Unavailable (Comment)(2 attempts for tx session: 1. Pt JANET away for HD in am; 2. Pt receiving blood in pm). Will follow-up on next scheduled visit.    Ann Marie Klein, PTA  10/7/2019

## 2019-10-07 NOTE — NURSING
Patient received 2nd unit of blood. Patient vital signs are stable. No distress noted. Will pass on to oncoming nurse.

## 2019-10-07 NOTE — SUBJECTIVE & OBJECTIVE
Interval History: Patient w/ Hgb from from 7.2 to 6.1. Patient denies any SOB, syncope, or chest pain. Patient mildly tachycardic.      Review of Systems   Constitutional: Negative for fatigue and fever.   HENT: Negative for congestion and sore throat.    Eyes: Negative for visual disturbance.   Respiratory: Negative for cough and shortness of breath.    Cardiovascular: Negative for chest pain.   Gastrointestinal: Positive for vomiting (coffee ground emesis). Negative for abdominal pain and nausea.   Genitourinary: Negative for dysuria.   Skin: Negative for rash.   Neurological: Negative for syncope and headaches.   Psychiatric/Behavioral: Negative for agitation and confusion.     Objective:     Vital Signs (Most Recent):  Temp: 98.6 °F (37 °C) (10/07/19 1801)  Pulse: 101 (10/07/19 1801)  Resp: 16 (10/07/19 1801)  BP: 128/71 (10/07/19 1801)  SpO2: 98 % (10/07/19 1801) Vital Signs (24h Range):  Temp:  [97.7 °F (36.5 °C)-98.8 °F (37.1 °C)] 98.6 °F (37 °C)  Pulse:  [] 101  Resp:  [14-19] 16  SpO2:  [98 %-100 %] 98 %  BP: ()/(35-86) 128/71     Weight: 58.8 kg (129 lb 10.1 oz)  Body mass index is 20.92 kg/m².    Intake/Output Summary (Last 24 hours) at 10/7/2019 1829  Last data filed at 10/7/2019 1800  Gross per 24 hour   Intake 1666 ml   Output 1120 ml   Net 546 ml      Physical Exam   Constitutional: He is oriented to person, place, and time. He appears well-developed and well-nourished.   Patient is lying bed, appears alert, and in no acute distress.    HENT:   Head: Normocephalic and atraumatic.   Eyes: Pupils are equal, round, and reactive to light. EOM are normal.   Neck: Normal range of motion. No thyromegaly present.   Cardiovascular: Normal rate, regular rhythm and intact distal pulses.   Pulmonary/Chest: Effort normal and breath sounds normal. No respiratory distress.   Abdominal: Soft. Bowel sounds are normal. He exhibits no distension. There is no tenderness.   Musculoskeletal: Normal range of  motion.   EARLENE BKA    Neurological: He is alert and oriented to person, place, and time.   Skin: Skin is warm and dry.   Psychiatric: He has a normal mood and affect. His behavior is normal.   Vitals reviewed.    Significant Labs: All pertinent labs within the past 24 hours have been reviewed.     Recent Labs   Lab 10/03/19  0554  10/04/19  0340 10/04/19  0843 10/05/19  0756 10/06/19  1857 10/07/19  0802 10/07/19  0803   WBC 5.87   < >  --  5.93  --  9.86 6.11  --    HGB 7.2*   < >  --  7.7*  --  7.2* 6.1*  --    HCT 23.5*   < >  --  23.4*  --  22.3* 18.6*  --       < >  --  186  --  245 218  --    *   < >  --  95  --  97 98  --    RDW 15.5*   < >  --  15.5*  --  16.0* 16.3*  --      --  139  --  138  --   --  136   K 4.4  --  4.4  --  4.6  --   --  4.5   *  --  108  --  107  --   --  102   CO2 24  --  22*  --  24  --   --  26   BUN 16  --  27*  --  22*  --   --  58*   CREATININE 4.6*  --  6.7*  --  5.2*  --   --  9.0*     --  144*  --  96  --   --  96   PROT 5.1*  --  5.7*  --  5.6*  --   --   --    ALBUMIN 1.8*  --  1.9*  --  1.9*  --   --  1.8*   BILITOT 0.3  --  0.3  --  0.3  --   --   --    AST 18  --  17  --  19  --   --   --    ALKPHOS 134  --  142*  --  136*  --   --   --    ALT 7*  --  7*  --  8*  --   --   --     < > = values in this interval not displayed.       Significant Imaging: I have reviewed all pertinent imaging results/findings within the past 24 hours.

## 2019-10-07 NOTE — ASSESSMENT & PLAN NOTE
Anemia of chronic disease and possibly superimposed acute GIB loss due to gastritis vs PUD vs esophagitis, vs MWT (in setting of vomiting).   No prior history of cancer or esophageal varices on multiple prior EGDs.   Previous EGD 9/23, LA Grade D reflux esophagitis. Medium-sized hiatal hernia. No active GI bleed.  Patient hemodynamically stable  EGD (10/2): unrevealing for any acute GI bleed.  Will consider further imaging to look for cause of decreased hemoglobin, possibly retroperitoneal per GI recommendations.   10/06 Hgb drop to 6.1     PLAN:  - Continue PPI 40mg BID and Carafate 0.5g BID, per GI recommendations until his outpatient follow up appointment.  - Patient to be discharged to nursing home for medication management.    - Upon discharge, patient to follow up in the GI clinic for additional endoscope and colonoscopy in 8-12 weeks.   - Hemoglobin has stabilized.     - Type and cross, consented.   - Transfuse for Hg <7g/dl.   - Maintain 2 large bore IVs.   - GI consulted, appreciate recommendations

## 2019-10-07 NOTE — CARE UPDATE
"RAPID RESPONSE NURSE PROACTIVE ROUNDING NOTE     Time of Visit:     Admit Date: 2019  LOS: 6  Code Status: Prior   Date of Visit: 10/06/2019  : 1965  Age: 54 y.o.  Sex: male  Race: Black or   Bed: 8094/8094 A:   MRN: [id number]  Was the patient discharged from an ICU this admission? no   Was the patient discharged from a PACU within last 24 hours?  no  Did the patient receive conscious sedation/general anesthesia in last 24 hours?  no  Was the patient in the ED within the past 24 hours?  no  Was the patient started on NIPPV within the past 24 hours?  no  Attending Physician: Lainey Wheat MD  Primary Service: [facility] MED 3    ASSESSMENT     Diagnosis: Gastrointestinal hemorrhage with hematemesis    Abnormal Vital Signs: BP (!) 104/59 (BP Location: Left arm, Patient Position: Lying)   Pulse (!) 133   Temp 98.8 °F (37.1 °C) (Oral)   Resp 18   Ht 5' 6" (1.676 m)   Wt 58.8 kg (129 lb 10.1 oz)   SpO2 100%   BMI 20.92 kg/m²      Clinical Issues: Circulatory    Patient  has a past medical history of Amputation stump pain, Aspiration pneumonia, Asterixis, C. 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    Pt admitted for hematemesis. EGD this admit neg for source of " bleed. Pt pending placement to LTACH with x3 episodes hematemesis this afternoon. H/H decreased 7.7/23.4 to 7.7/22.3.      On assessment pt denies complaints, respirations even/unlabored 16-18. Other VS stable.   INTERVENTIONS/ RECOMMENDATIONS     Monitor HR, consider small fluid bolus. Pt scheduled for dialysis in AM. Notify RRT/Primary Team for additional episodes of hematemesis.     Discussed plan of care with RNMike.    PHYSICIAN ESCALATION     Yes/No  yes    Orders received and case discussed with Dr. Sandoval.    Disposition: Remain in room 8094.    FOLLOW-UP     Call back the Rapid Response Nurse, Travis Hartley RN at [phone] for additional questions or concerns.

## 2019-10-07 NOTE — PT/OT/SLP PROGRESS
Occupational Therapy      Patient Name:  Vaughn Retana   MRN:  [id number]    Patient not seen today secondary to Dialysis. Will follow-up as schedule allows.    Bryanna Holman OT  10/7/2019

## 2019-10-07 NOTE — PLAN OF CARE
10/07/19 0904   Post-Acute Status   Post-Acute Authorization Placement;Other   Other Status See Comments     Patient is expected to discharge to [facility] today. SW will continue to follow up.    Joselyn Rincon LMSW  [facility]   [id number]

## 2019-10-08 NOTE — PLAN OF CARE
[facility]     Department of Hospital Medicine     [address] [place] [address]     [phone] after hours  [phone] fax       NURSING HOME ORDERS    10/08/2019    Admit to Nursing Home:  Regular Bed       Diagnoses:  Active Hospital Problems    Diagnosis  POA    *Gastrointestinal hemorrhage with hematemesis [K92.0]  Yes    Gastroparesis [K31.84]  Yes    GERD with esophagitis [K21.0]  Yes    Chronic kidney disease-mineral and bone disorder [N18.9, E83.9, M89.9]  Yes     Chronic    History of Intracerebral Hemorrhage: L BG 5/2013; R BG 9/2016; R BG 11/2016; L caudate head 2/2017 [Z86.79]  Not Applicable     Chronic    ESRD on hemodialysis [N18.6, Z99.2]  Not Applicable     Chronic     MWF at [facility]      Anemia in chronic kidney disease [N18.9, D63.1]  Yes     Chronic    Secondary hyperparathyroidism of renal origin [N25.81]  Yes      Resolved Hospital Problems   No resolved problems to display.       Patient is homebound due to:  Gastrointestinal hemorrhage with hematemesis    Allergies:  Review of patient's allergies indicates:   Allergen Reactions    Fosrenol [lanthanum] Nausea And Vomiting     Nausea and vomiting       Vitals: Once weekly    Diet: Renal Diet    Acitivities:    - Up in a chair each morning as tolerated   - Ambulate with assistance to bathroom   - May ambulate independently   - May use walker, cane, or self-propelled wheelchair   - Weight bearing: As Tolerated    LABS:  Per facility protocol   CMP, CBC each month for 3 months  Nursing Precautions:  - Aspiration precautions:             - Total assistance with meals            -  Upright 90 degrees befor during and after meals             -  Suction at bedside          - Fall precautions per nursing home protocol   - Decubitus precautions:        -  for positioning   - Pressure reducing foam mattress   - Turn patient every two hours. Use wedge pillows to anchor  patient    CONSULTS:       Physical Therapy to evaluate and treat     Occupational Therapy to evaluate and treat    Medications: Discontinue all previous medication orders, if any. See new list below.   Vaughn Retana   Home Medication Instructions ANABEL:[id number]    Printed on:10/08/19 1035   Medication Information                      acetaminophen (TYLENOL) 325 MG tablet  Take 2 tablets (650 mg total) by mouth every 8 (eight) hours as needed.             carvedilol (COREG) 3.125 MG tablet  Take 1 tablet (3.125 mg total) by mouth 2 (two) times daily with meals.             metoclopramide HCl (REGLAN) 10 MG tablet  Take 1 tablet (10 mg total) by mouth 3 (three) times daily before meals.            midodrine (PROAMATINE) 5 MG Tab  Please take 30 min before dialysis on Monday Wednesday and Friday             ondansetron (ZOFRAN) 8 MG tablet  Take 1 tablet (8 mg total) by mouth every 12 (twelve) hours as needed for Nausea.             pantoprazole (PROTONIX) 40 MG tablet  Take 1 tablet (40 mg total) by mouth 2 (two) times daily.             RENAPLEX-D 800 mcg-12.5 mg -2,000 unit Tab  Take 1 tablet by mouth once daily.             sevelamer carbonate (RENVELA) 800 mg Tab  Take 1 tablet (800 mg total) by mouth 3 (three) times daily with meals.             sucralfate (CARAFATE) 100 mg/mL suspension  Take 10 mLs (1 g total) by mouth 4 (four) times daily before meals and nightly.                       _________________________________  Ilia Payan MD  10/08/2019

## 2019-10-08 NOTE — PT/OT/SLP PROGRESS
Occupational Therapy   Treatment    Name: Vaughn Retana  MRN: [id number]  Admitting Diagnosis:  Gastrointestinal hemorrhage with hematemesis  6 Days Post-Op    Recommendations:     Discharge Recommendations: home health OT  Discharge Equipment Recommendations:  none  Barriers to discharge:  None    Assessment:     Vaughn Retana is a 54 y.o. male with a medical diagnosis of Gastrointestinal hemorrhage with hematemesis.  He presents with impaired ADL and functional mobility performance. Pt required mod encouragement to participate as pt found with bed linens covering pt completely with pt lethargic this morning. Pt participated in bed mobility, sit>stand t/f, hallway ambulation, and IADL tasks performed including money management for purchase of food/drink at vending machine. Pt required SBA with bed mobility, LB dressing, donning of prosthetic. Pt needed CGA with min vc's for safety using rollator upon mobilizing in hallway. Pt presents as a safety risk as pt impulsive and required reminder for locking breaks of rollator upon sitting on seat of rollator. Performance deficits affecting function are weakness, impaired endurance, impaired self care skills, impaired functional mobilty, gait instability, impaired cognition, decreased safety awareness. Pt would benefit from continued OT skilled services 2x/wk to improve daily living skills to optimize QOL. Pt is recommended to discharge to OT at this time.      Rehab Prognosis:  Good; patient would benefit from acute skilled OT services to address these deficits and reach maximum level of function.       Plan:     Patient to be seen 2 x/week to address the above listed problems via self-care/home management, therapeutic activities, therapeutic exercises  · Plan of Care Expires: 11/03/19  · Plan of Care Reviewed with: patient    Subjective     Pain/Comfort:  · Pain Rating 1: 0/10  · Pain Rating Post-Intervention 1: 0/10    Objective:     Communicated with: RN prior  to session.  Patient found left sidelying with telemetry upon OT entry to room.    General Precautions: Standard, fall   Orthopedic Precautions:N/A   Braces: (LLE prosthetic)     Occupational Performance:     Bed Mobility:    · Patient completed Rolling/Turning to Right with stand by assistance  · Patient completed Scooting/Bridging with stand by assistance  · Patient completed Supine to Sit with stand by assistance  · Patient completed Sit to Supine with stand by assistance     Functional Mobility/Transfers:  · Patient completed Sit <> Stand Transfer with contact guard assistance  with  rollator   · Functional Mobility: Pt completed extensive bedroom and hallway ambulation with CGA. Pt required min vc's for rollator management and for safety with navigating hallway environment. Pt needed rest breaks throughout hallway ambulation 2/2 fatigue.     Activities of Daily Living:  · Feeding:  setup for feeding and drinking items with HOB elevated  .  · Upper Body Dressing: contact guard assistance donning gown at EOB  · Lower Body Dressing: stand by assistance donning LE prosthetic and donning R tennis shoe.  · Grooming: toileting tasks setup for pt however pt gentle refusal following mobility.  · IADLs: purchasing items for feeding/drinking at vending machine. Pt required additional time for money management with math calculations for appropriate change.      [facility] 6 Click ADL: 24    Treatment & Education:  Pt educated on role of occupational therapy, POC, and safety during ADLs and functional mobility. Pt and OT discussed importance of safe, continued mobility to optimize daily living skills. Pt verbalized understanding.   Pt completed the following during session: bed mobility, EOB  Sitting balance, sit>stand t/f, LB dressing, bedroom and hallway ambulation. Pt also completed IADL tasks including money management needed for purchasing feeding/drinking items.   White board updated during session. Pt given instruction to  call for medical staff/nurse for assistance.       Patient left HOB elevated with all lines intact, call button in reach, bed alarm on and RN Lou notifiedEducation:      GOALS:   Multidisciplinary Problems     Occupational Therapy Goals        Problem: Occupational Therapy Goal    Goal Priority Disciplines Outcome Interventions   Occupational Therapy Goal     OT, PT/OT Ongoing, Progressing    Description:  Goals to be met by: 10/15/19     Patient will increase functional independence with ADLs by performing:    UE Dressing with Modified [place].  LE Dressing with Modified [place].  Grooming while standing with Modified [place].  Toileting from toilet with Modified [place] for hygiene and clothing management.   Rolling to Bilateral with Modified [place].   Supine to sit with Modified [place].  Step transfer with Modified [place]  Toilet transfer to toilet with Modified [place].                      Time Tracking:     OT Date of Treatment: 10/08/19  OT Start Time: 0946  OT Stop Time: 1030  OT Total Time (min): 44 min    Billable Minutes:Self Care/Home Management 30 min  Therapeutic Activity 14 min    Bryanna Holman OT  10/8/2019

## 2019-10-08 NOTE — ASSESSMENT & PLAN NOTE
Continue PPI BID  H/o recurrent EGDs as noted above in HPI.   GI consulted- will follow in clinic in 8-12 weeks

## 2019-10-08 NOTE — PLAN OF CARE
Problem: Occupational Therapy Goal  Goal: Occupational Therapy Goal  Description  Goals to be met by: 10/15/19     Patient will increase functional independence with ADLs by performing:    UE Dressing with Modified [place].  LE Dressing with Modified [place].  Grooming while standing with Modified [place].  Toileting from toilet with Modified [place] for hygiene and clothing management.   Rolling to Bilateral with Modified [place].   Supine to sit with Modified [place].  Step transfer with Modified [place]  Toilet transfer to toilet with Modified [place].    Continue OT POC.  Bryanna Holman OT  10/8/2019    Outcome: Ongoing, Progressing

## 2019-10-08 NOTE — PROGRESS NOTES
IV removed, catheter intact, pt tolerated well. Report given to Jennifer at [facility]. Awaiting transportation. WCTM.

## 2019-10-08 NOTE — PLAN OF CARE
Problem: Physical Therapy Goal  Goal: Physical Therapy Goal  Description  Goals to be met by: 10/17/2019     Patient will increase functional independence with mobility by performin. Supine to sit with [place]  2. Sit to supine with [place]  3. Sit to stand transfer with Modified [place]  4. Gait x 200 feet with [place] with Supervision.       Outcome: Ongoing, Progressing    Yimi Mark PT,DPT  10/8/2019

## 2019-10-08 NOTE — PLAN OF CARE
10/08/19 1635   Post-Acute Status   Post-Acute Authorization Placement   Post-Acute Placement Status Set-up Complete   Other Status See Comments     Patient is discharging to [facility] today by stretcher. NATTY set up transportation via [facility] for 5:45  pm. Nurse call report to [phone].Patient will discharge to Room 206A    Joselyn Rincon LMSW  [facility]   [phone]

## 2019-10-08 NOTE — ASSESSMENT & PLAN NOTE
Anemia of chronic disease and possibly superimposed acute GIB loss due to gastritis vs PUD vs esophagitis, vs MWT (in setting of vomiting).   No prior history of cancer or esophageal varices on multiple prior EGDs.   Previous EGD 9/23, [place] Grade D reflux esophagitis. Medium-sized hiatal hernia. No active GI bleed.  Patient hemodynamically stable  EGD (10/2): unrevealing for any acute GI bleed.  Will consider further imaging to look for cause of decreased hemoglobin, possibly retroperitoneal per GI recommendations.   10/07 Hgb drop to 6.1. Received 2 units pRBC    PLAN:  - Continue PPI 40mg BID and Carafate 0.5g BID, per GI recommendations until his outpatient follow up appointment.  - Patient to be discharged to nursing home today for medication management.    - Upon discharge, patient to follow up in the GI clinic for additional endoscope and colonoscopy in 8-12 weeks.   - Hemoglobin has stabilized.     - Type and cross, consented.   - Transfuse for Hg <7g/dl.   - Maintain 2 large bore IVs.

## 2019-10-08 NOTE — PLAN OF CARE
"  Problem: Adult Inpatient Plan of Care  Goal: Plan of Care Review  Outcome: Ongoing, Progressing   POC reviewed with patient. Pt refused turning q2hr. Pt refused rounding and VS q2hr from 2300 - 0500. Pt began shift with nausea and active vomiting, PO medication given, symptoms decreased. Pt completed his second unit of PRBC's, tolerated well. VSS. Pt showed agitation when attempting to get vitals at 0430 even though patient was awake  stating "I don't need no more of that", and setting off bed alarm, patient was found laying in awkward position, had knocked over the entire pitcher of water.   Bed alarm remained in place. Pt remained free of falls. WCTM.  "

## 2019-10-08 NOTE — CARE UPDATE
"Pt found to be laying improperly in bed,  Knocked over water pitcher. Properly adjusted patient . Attempted to do vitals, pt refused stating "I don't need no more of that."   Will attempt again closer to 0600.  "

## 2019-10-08 NOTE — PT/OT/SLP PROGRESS
"Physical Therapy Treatment    Patient Name:  Vaughn Retana   MRN:  [id number]    Recommendations:     Discharge Recommendations:  home health PT, home health OT   Discharge Equipment Recommendations: none   Barriers to discharge: Decreased caregiver support    Assessment:     Vaughn Retana is a 54 y.o. male admitted with a medical diagnosis of Gastrointestinal hemorrhage with hematemesis.  He presents with the following impairments/functional limitations:  weakness, impaired self care skills, impaired balance, impaired functional mobilty, impaired endurance, gait instability, decreased safety awareness.  Tolerated session c c/o fatigue.  Performed mobility c SBA-CGA.  Pt able to amb short distance in room c rollator and demo mild unsteadiness c no overt LOB.  Pt impulsive on this date and requiring frequent cues to slow down and redirections to task at hand.  Pt safe to amb c assistance of 1x person.  Pt would benefit from continued skilled acute PT 2x/wk to improve functional mobility.      Rehab Prognosis: Good; patient would benefit from acute skilled PT services to address these deficits and reach maximum level of function.    Recent Surgery: Procedure(s) (LRB):  EGD (ESOPHAGOGASTRODUODENOSCOPY) (N/A) 6 Days Post-Op    Plan:     During this hospitalization, patient to be seen 2 x/week to address the identified rehab impairments via gait training, therapeutic exercises, therapeutic activities, neuromuscular re-education and progress toward the following goals:    · Plan of Care Expires:  11/02/19    Subjective     Chief Complaint: fatigue  Patient/Family Comments/goals: "I just got up with that blonde lady." - re OT session prior to PT  Pain/Comfort:  · Pain Rating 1: 0/10      Objective:     Communicated with RN prior to session.  Patient found HOB elevated with peripheral IV, telemetry upon PT entry to room.     General Precautions: Standard, fall   Orthopedic Precautions:N/A   Braces: (LLE prosthesis) "     Functional Mobility:  · Bed Mobility:     · Rolling Left:  stand by assistance  · Scooting: stand by assistance  · Supine to Sit: stand by assistance  · Sit to Supine: stand by assistance  · Transfers:     · Sit to Stand:  stand by assistance with rolling walker  · Gait: 60ft c rollator SBA-CGA  · Decreased gait speed, mild unsteadiness, impulsive  · Balance: standing (SBA-CGA)      AM-PAC 6 CLICK MOBILITY  Turning over in bed (including adjusting bedclothes, sheets and blankets)?: 4  Sitting down on and standing up from a chair with arms (e.g., wheelchair, bedside commode, etc.): 3  Moving from lying on back to sitting on the side of the bed?: 3  Moving to and from a bed to a chair (including a wheelchair)?: 3  Need to walk in hospital room?: 3  Climbing 3-5 steps with a railing?: 3  Basic Mobility Total Score: 19       Therapeutic Activities and Exercises:  Pt educated on: PT role/POC; safety c mobility; benefits of OOB activities; performing therex; d/c recs - v/u      Patient left HOB elevated with all lines intact, call button in reach and RN notified..    GOALS:   Multidisciplinary Problems     Physical Therapy Goals        Problem: Physical Therapy Goal    Goal Priority Disciplines Outcome Goal Variances Interventions   Physical Therapy Goal     PT, PT/OT Ongoing, Progressing     Description:  Goals to be met by: 10/17/2019     Patient will increase functional independence with mobility by performin. Supine to sit with [place]  2. Sit to supine with [place]  3. Sit to stand transfer with Modified [place]  4. Gait x 200 feet with [place] with Supervision.                        Time Tracking:     PT Received On: 10/08/19  PT Start Time: 1104     PT Stop Time: 1114  PT Total Time (min): 10 min     Billable Minutes: Therapeutic Activity 10 min    Treatment Type: Treatment  PT/PTA: PT     PTA Visit Number: 0     Yimi Mark, PT  10/08/2019

## 2019-10-08 NOTE — PLAN OF CARE
10/08/19 1209   Post-Acute Status   Post-Acute Authorization Placement;Other   Other Status See Comments     Patient expected to discharge to [facility] today. SW will follow up.    Joselyn Rincon LMSW  [facility]   [phone]

## 2019-10-08 NOTE — DISCHARGE SUMMARY
[facility] Hospital Medicine  Discharge Summary      Patient Name: Vaughn Retana  MRN: [id number]  Admission Date: 9/30/2019  Hospital Length of Stay: 8 days  Discharge Date and Time:  10/08/2019 4:02 PM  Attending Physician: Lainey Wheat MD   Discharging Provider: Humble Gamboa MD  Primary Care Provider: Primary Doctor [facility] Medicine Team: [facility] 3 Humble Gamboa MD    HPI:   Mr. Retana is a 53 yo AAM with ESRD on dialysis MWF, L below knee amputation 2/2 osteomyelitis, dCHF, GERD, h/o multiple ICH w/o residual deficits, who [place] [facility] [place] with primary complaint of hematemesis. Patient is a poor historian and has limited knowledge of his medical history. During the interview the patient would not cooperate and provide much/any history. History is via ED physician and chart review.    Patient presenting to ED with a c/c of hematemesis x 2 episodes. Of note patient was evaluated in the ED yesterday (9/29) for a similar compliant. During that visit he was given IV reglan and similiarly failed to elaporate on his chief complaint and was discharged. Prior to this visit, patient was [place] [facility] [place]20[place]9/23) for hematemesis, he received a total 2U PRBC and underwent endoscopy on 9/23 which showed some evidence of erosive esophagitis but no active or ongoing bleeding. At time of discharge Hg noted to be 10.2 (9/23) and today 8.7 (9/30).    Per chart review patient he was seen in the ED yesterday, and vomiting resolved with 1 dose of IM Reglan. He returned today (9/30) with persistent coffee ground emesis. He denies blood in his stool. Also denies hemoptysis. He is minimally responsive when asked about his symptoms and ROS. Last dialyzed today (M/W/F HD schedule).     In the ED patient is hemodynamically stable. PPI 80mg IV, 500cc bolus x 2 doses, type and screen, blood consent and evaluated by GI.       PREVIOUS HISTORY:     The patient was discharged from the hospital on  9/6/19 for similar symptoms of abdominal pain with intractable nausea and vomiting and was evaluated by GI at that time as well. However, a repeat EGD was not done due to the patient being seen a [facility] earlier in August and having had an EGD done on 8/2 showing grade A esophagitis. A NM gastric emptying study was done during the last admission that was suggestive of gastroparesis retaining 50% of his gastric contents 4 hours after meals. He was placed on a PPI 40 mg BID, Reglan TID before meals, and Carafate BID and reports being compliant with his medications.     The following is a break down of his GI procedure history:    9/23/19- EGD [facility] - LA Grade D reflux esophagitis. Medium-sized hiatal hernia. No active GI bleed.    8/22/19 - EGD [facility] - LA grade A reflux esophagitis with no active bleeding     3/7/2019 - EGD [facility] - LA Grade D reflux esophagitis involving the entire esophagus  with a 2 cm sliding hiatal hernia. Congestive gastropathy, erythematous doudenopathy. No specimens collected     5/22/2018 - EGD [facility] - LA grade D reflux esophagitis with small hiatal hernia. Normal stomach and duodenum. No specimens collected     3/16/2018 - EGD [facility] - LA grade C reflux esophagitis with non-bleeding gastric ulcer with a clean base and a 4 cm hiatal hernia. 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 Normal duodenum     3/8/2018 - EGD [facility] - LA Grade B reflux esophagitis with a 3 cm hiatal hernia. Gastric polyp no biopsies. Normal duodenum     4/4/2017 - EGD [facility] -  LA Grade D reflux esophagitis with a small hiatal hernia. Normal stomach and duodenum     4/4/2017 - Colonoscopy [facility] - 3 mm polyp removed from the transverse colon pathology adenomatous. 8 mm polyp found in the sigmoid colon removed Pathology adenovillous. Suggestion to repeat in 3 years    Procedure(s) (LRB):  EGD (ESOPHAGOGASTRODUODENOSCOPY) (N/A)      Hospital Course:   Patient admitted for  evaluation of his anemia and hematemesis.  GI consulted agrees with conservative IV PPI plan.  They recently scoped him on 9/23 which showed a grade D esophagitis. Continue patient's PPI.  H/H dropped from 8.7 to 7.0 today.  NPO @ midnight with EGD scheduled for tomorrow morning.  Overnight, patient's Hgb continued to drop to 6.2, ordered 1x u pRBC.  EGD following morning was unrevealing for source of GI bleed. His hemoglobin has stabilized around 8~.  Plan for discharge to nursing home for further medication management.  Prior to DC patient w/ reported coffee ground emesis. Subsequent AM Hgb dropped from 7.2 to 6.1. Patient transfused 2U pRBCs resulting to a level of 9.2. Patient to follow up in the GI clinic for f/u EGD in 8-12 weeks.     Vitals:    10/08/19 1300   BP: (!) 148/75   Pulse: 90   Resp: 16   Temp: 97.8 °F (36.6 °C)     Constitutional: He is oriented to person, place, and time. He appears well-developed and well-nourished.   Patient is lying bed, appears alert, and in no acute distress.    HENT:   Head: Normocephalic and atraumatic.   Eyes: Pupils are equal, round, and reactive to light. EOM are normal.   Neck: Normal range of motion. No thyromegaly present.   Cardiovascular: Normal rate, regular rhythm and intact distal pulses.   Pulmonary/Chest: Effort normal and breath sounds normal. No respiratory distress.   Abdominal: Soft. Bowel sounds are normal. He exhibits no distension. There is no tenderness.   Musculoskeletal: Normal range of motion.   L BKA    Neurological: He is alert and oriented to person, place, and time.   Skin: Skin is warm and dry.   Psychiatric: He has a normal mood and affect. His behavior is normal.   Vitals reviewed.      Consults:   Consults (From admission, onward)        Status Ordering Provider     Inpatient consult to Gastroenterology  Once     Provider:  (Not yet assigned)    RICHARD Barker     Inpatient consult to Nephrology  Once     Provider:  (Not yet  assigned)    Completed JHONNY TREVIZO     Inpatient consult to PICC team (AVINASH)  Once     Provider:  (Not yet assigned)    Completed ELLE CORTES          * Gastrointestinal hemorrhage with hematemesis  Anemia of chronic disease and possibly superimposed acute GIB loss due to gastritis vs PUD vs esophagitis, vs MWT (in setting of vomiting).   No prior history of cancer or esophageal varices on multiple prior EGDs.   Previous EGD 9/23, LA Grade D reflux esophagitis. Medium-sized hiatal hernia. No active GI bleed.  Patient hemodynamically stable  EGD (10/2): unrevealing for any acute GI bleed.  Will consider further imaging to look for cause of decreased hemoglobin, possibly retroperitoneal per GI recommendations.   10/07 Hgb drop to 6.1. Received 2 units pRBC    PLAN:  - Continue PPI 40mg BID and Carafate 0.5g BID, per GI recommendations until his outpatient follow up appointment.  - Patient to be discharged to nursing home today for medication management.    - Upon discharge, patient to follow up in the GI clinic for additional endoscope and colonoscopy in 8-12 weeks.   - Hemoglobin has stabilized.     - Type and cross, consented.   - Transfuse for Hg <7g/dl.   - Maintain 2 large bore IVs.             Gastroparesis  - changed reglan to PRN as patient denies any nausea/ vomiting      GERD with esophagitis  Continue PPI BID  H/o recurrent EGDs as noted above in HPI.   GI consulted- will follow in clinic in 8-12 weeks      Chronic kidney disease-mineral and bone disorder  Continue renvela    History of Intracerebral Hemorrhage: L BG 5/2013; R BG 9/2016; R BG 11/2016; L caudate head 2/2017  Chronic, stable.   Continue to monitor.   Not currently on DAPT, or AC given h/o ICH/GIB.   PT/OT.    ESRD on hemodialysis  Consult nephrology for resumption of HD.   Dialyzes M/W/F. Tolerated HD yesterday        Anemia in chronic kidney disease  -Refer to GI bleed         Final Active Diagnoses:    Diagnosis Date Noted POA     PRINCIPAL PROBLEM:  Gastrointestinal hemorrhage with hematemesis [K92.0] 08/25/2019 Yes    Gastroparesis [K31.84] 09/20/2019 Yes    GERD with esophagitis [K21.0]  Yes    Chronic kidney disease-mineral and bone disorder [N18.9, E83.9, M89.9] 07/28/2017 Yes     Chronic    History of Intracerebral Hemorrhage: L BG 5/2013; R BG 9/2016; R BG 11/2016; L caudate head 2/2017 [Z86.79] 11/02/2016 Not Applicable     Chronic    ESRD on hemodialysis [N18.6, Z99.2] 02/07/2013 Not Applicable     Chronic    Anemia in chronic kidney disease [N18.9, D63.1] 09/17/2012 Yes     Chronic    Secondary hyperparathyroidism of renal origin [N25.81] 09/17/2012 Yes      Problems Resolved During this Admission:       Discharged Condition: stable    Disposition:  Nursing home     Follow Up: GI clinic     Patient Instructions:   No discharge procedures on file.        Pending Diagnostic Studies:     None         Medications:  Reconciled Home Medications:      Medication List      CHANGE how you take these medications    CARAFATE 100 mg/mL suspension  Generic drug:  sucralfate  Take 10 mLs (1 g total) by mouth 4 (four) times daily before meals and nightly.  What changed:    · how much to take  · when to take this     pantoprazole 40 MG tablet  Commonly known as:  PROTONIX  Take 1 tablet (40 mg total) by mouth 2 (two) times daily.  What changed:    · when to take this  · Another medication with the same name was removed. Continue taking this medication, and follow the directions you see here.     sevelamer carbonate 800 mg Tab  Commonly known as:  RENVELA  Take 1 tablet (800 mg total) by mouth 3 (three) times daily with meals.  What changed:  how much to take        CONTINUE taking these medications    acetaminophen 325 MG tablet  Commonly known as:  TYLENOL  Take 2 tablets (650 mg total) by mouth every 8 (eight) hours as needed.     carvedilol 3.125 MG tablet  Commonly known as:  COREG  Take 1 tablet (3.125 mg total) by mouth 2 (two)  times daily with meals.     metoclopramide HCl 10 MG tablet  Commonly known as:  REGLAN  Take 1 tablet (10 mg total) by mouth 3 (three) times daily before meals. can be titrated up as tolerated to maximum of 40 mg/day, with addition of evening dose as indicated,     midodrine 5 MG Tab  Commonly known as:  PROAMATINE  Please take 30 min before dialysis on Monday Wednesday and Friday     ondansetron 8 MG tablet  Commonly known as:  ZOFRAN  Take 1 tablet (8 mg total) by mouth every 12 (twelve) hours as needed for Nausea.     RENAPLEX-D 800 mcg-12.5 mg -2,000 unit Tab  Generic drug:  vit B,C-FA-zinc-selen-vit D3-E  Take 1 tablet by mouth once daily.        STOP taking these medications    prochlorperazine 10 MG tablet  Commonly known as:  COMPAZINE            Indwelling Lines/Drains at time of discharge:   Lines/Drains/Airways     Drain                 Hemodialysis AV Fistula Right upper arm -- days         Hemodialysis AV Fistula Right upper arm -- days                Time spent on the discharge of patient: 35 minutes  Patient was seen and examined on the date of discharge and determined to be suitable for discharge.         Humble Gamboa MD  Department of Hospital Medicine  [facility]

## 2019-10-09 NOTE — PLAN OF CARE
Pt discharged to [facility]. GI scheduled contacted for follow up.        10/09/19 0901   Final Note   Assessment Type Final Discharge Note   Anticipated Discharge Disposition [facility]   Hospital Follow Up  Appt(s) scheduled?   (Contacted   for follow up)       Julie Haase RN  Case Management [phone]

## 2019-10-09 NOTE — PT/OT/SLP DISCHARGE
Occupational Therapy Discharge Summary    Vaughn Retana  MRN: [id number]   Principal Problem: Gastrointestinal hemorrhage with hematemesis      Patient Discharged from acute Occupational Therapy on 10/8/19.  Please refer to prior OT note dated 10/8/19 for functional status.    Assessment:      Goals partially met.    Objective:     GOALS:   Multidisciplinary Problems     Occupational Therapy Goals        Problem: Occupational Therapy Goal    Goal Priority Disciplines Outcome Interventions   Occupational Therapy Goal     OT, PT/OT Ongoing, Progressing    Description:  Goals to be met by: 10/15/19     Patient will increase functional independence with ADLs by performing:    UE Dressing with Modified [place].  LE Dressing with Modified [place].  Grooming while standing with Modified [place].  Toileting from toilet with Modified [place] for hygiene and clothing management.   Rolling to Bilateral with Modified [place].   Supine to sit with Modified [place].  Step transfer with Modified [place]  Toilet transfer to toilet with Modified [place].                      Reasons for Discontinuation of Therapy Services  Transfer to alternate level of care.      Plan:     Patient Discharged to: [facility]    Bryanna Holman OT  10/9/2019

## 2019-10-09 NOTE — TELEPHONE ENCOUNTER
Contacted patient to schedule EGD and colonoscopy.  Spoke to sister, Alicia. She informed me that he was discharged to nursing facility.  Direct line phone number provided to call back when they are ready to schedule.  She stated understanding.        From: Yimi Jacques MD   Sent: 10/3/2019   9:40 AM CDT   To: ISMAEL Rosenberg,     Can you add a colonoscopy to Mr Retana's case request from yesterday?       Thanks,   Maninder

## 2019-10-11 NOTE — PROGRESS NOTES
[facility]                                        Long Term Care                   Progress Note     Admit Date: 10/8/19  Principal Problem:  Debility r/t multiple co morbidities, noncompliance, recent GI bleed  HPI obtained from patient interview and chart review     Visit Date: 10/11/2019    Chief Complaint: Establish Care/ Initial Visit s/p hospitalization for GI bleed    HPI:   Mr. Retana is a 53 y/o male with a pertinent PMHx of Chronic diastolic heart failure, multiple intracranial hemorrhages with left hemiparesis, end-stage renal on HD with secondary hyperparathyroidism, GERD, hep C, HTN, left BKA 2/2 osteomyelitis, latent TB,, pulmonary hypertension, noncompliance. He presented to the ED with primary complaint of hematemesis. The patient has had multiple admits for the same complaint over the last month, with multiple EGDs and blood transfusions, dx with esophagitis and discharged on reglan, carafate, and a PPI due to gastroparesis. Patient was scoped again during admit, no new findings, required more transfusions. Patient needed long term placement at NH. Patient to follow up in the GI clinic for f/u EGD in 8-12 weeks. Admitted to [facility] for [facility] care. Lab work reviewed during initial visit on admit to facility, Albumin low at 1.9, likely needs replacement with supplementation due to HD patient.       Past Medical History: Patient has a past medical history of Amputation stump pain (9/10/2013), Aspiration pneumonia (7/27/2015), Asterixis (11/8/2016), C. 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    Past Surgical History: Patient has a past surgical history that includes R AVF (9/12/12); Leg amputation through knee (12/18/2013); Foot amputation through metatarsal; Colonoscopy; Colonoscopy (N/A, 4/4/2017); Esophagogastroduodenoscopy (N/A, 6/12/2018); Upper gastrointestinal endoscopy; Esophagogastroduodenoscopy (N/A, 3/7/2019); Esophagogastroduodenoscopy (N/A, 9/23/2019); and Esophagogastroduodenoscopy (N/A, 10/2/2019).    Social History: Patient reports that he has quit smoking. He has a 10.00 pack-year smoking history. He has never used smokeless tobacco. He reports that he does not drink alcohol or use drugs.    Family History: family history includes Alcohol abuse in his maternal grandmother; Diabetes in his brother and maternal grandfather; Early death in his mother; Heart disease in his father; Hyperlipidemia in his father; Hypertension in his father and sister; Kidney disease in his father.    Allergies: Patient is allergic to fosrenol [lanthanum].    ROS  Constitutional: Negative for fever or fatigue.   Eyes: Negative for blurred vision, double vision and discharge.   Respiratory: Negative for cough, shortness of breath and wheezing.    Cardiovascular: Negative for chest pain, palpitations, and leg swelling.   Gastrointestinal: Negative for abdominal pain, constipation, diarrhea, nausea and vomiting.   Genitourinary:  Negative for dysuria, frequency and urgency.   Musculoskeletal:  + generalized weakness. Negative for back pain and myalgias.   Skin: Negative for itching and rash, L BKA,  RICHARD AV fistula.   Neurological: Negative for dizziness, speech change, and headaches.   Psychiatric/Behavioral: Negative for depression. The patient is not nervous/anxious.      PEx  BP: 128/70  HR:74     Constitutional: Patient appears somewhat malnourished, thin,  and in no distress   Head: Normocephalic and atraumatic.   Eyes: Pupils are equal, round, and reactive to light.   Neck: Normal range of motion. Neck supple.   Cardiovascular: Normal rate, regular rhythm and normal heart sounds.    Pulmonary/Chest: Effort normal and breath sounds are clear  Abdominal: Soft. Bowel sounds are normal.   Musculoskeletal: Normal range of motion.   Neurological: Alert and oriented to person, place, and not to time.   Psychiatric: Normal mood and affect. Behavior is somewhat withdrawn, slow to answer questions during assessment   Skin: Skin is warm and dry. Full skin assessment completed during visit, L BKA noted,  RICHARD AV fistula       Assessment and Plan:    Aftercare of Gastrointestinal hemorrhage with hematemesis, improving  Anemia in chronic kidney disease r/t ESRD, ongoing  GERD with esophagitis, ongoing  -gastritis vs PUD vs esophagitis, vs MWT (in setting of vomiting)>multiple prior EGDs.   -Previous EGD 9/23, LA Grade D reflux esophagitis. Medium-sized hiatal hernia. No active GI bleed> received PRBC transfusions during recent stay  -Continue Protonix 40mg BID + Carafate 0.5g 4 times daily + Reglan 10 mg PO TID before meals, per GI recommendations until his outpatient follow up appointment  -Continue zofran PRN.   -CBC, CMP Q month x 3 months ordered  -10/11 initiate for GI appointment to be scheduled for EGD in 8-12 weeks per their recs      Gastroparesis, ongoing  -Previous NM gastric emptying study was done > suggestive of gastroparesis retaining  50% of his gastric contents 4 hours after meals> started PPI 40 mg BID, Reglan TID before meals, and Carafate BID   - changed reglan to PRN during admit, as patient denies any nausea/ vomiting    HTN with chronic diastolic heart failure, ongoing  ESRD on hemodialysis, ongoing  Chronic kidney disease-mineral and bone disorder, ongoing  Hypoalbuminemia, ongoing  -Dialysis M/W/F via RICHARD AV fistula  -Continue Midodrine  On MWF prior to HD  -Continue renvela TID + renal diet  -Continue Coreg 3.125 mg PO BID  -10/11 initiate start of ProStat 30 mL p.o. B.i.d., initiate renal MVI QD (previously on Renaplex which is not available at facility)    Remote History of Intracerebral Hemorrhage, multiple, 5/2013; 9/2016; 11/2016; 2/2017  -Not currently on DAPT, or AC given h/o ICH/GIB.     Previous Left BKA 2/2 osteomyelitis  -continue prosthetic          No future appointments.     Recent Lab work 10/2019: H&H 8/27, Albumin 1.9, K+ 4.2, Phos 2.6.         Total time of the visit 111 minutes  Non physical exam/ non charting time: 85 minutes   Start Time:1200  Stop Time:1351  Description of non physical exam/non charting time: counseling patient on clinical conditions and therapies provided regarding management of chronic conditions, plan of care at nursing home, future GI plan of care and current management, pain control, compliance with current regimen.  Extensive chart review completed including all consultation notes.  All pertinent laboratory and radiographical images reviewed.        Kimberly Hernandez NP          Patient note was created using MModal Dictation.  Any errors in syntax or even information may not have been identified and edited on initial review prior to signing this note.

## 2019-11-08 PROBLEM — K92.2 UPPER GI BLEED: Status: ACTIVE | Noted: 2019-01-01

## 2019-11-08 NOTE — ED NOTES
Adult Physical Assessment  LOC: Vaughn Retana, 54 y.o. male verified via two identifiers.  The patient is awake, alert, and answering yes or no questions.  APPEARANCE: Patient Pt burping and dry heaving with brownish red vomitus and spit. Appears unwell and lethargic. Patient is clean and well groomed, patient's clothing is properly fastened.  SKIN:The skin is warm and dry, color consistent with ethnicity, patient has normal skin turgor and moist mucus membranes, excessive spit production, skin intact, no breakdown or brusing noted. AV fistula to right upper extremity, receives dialysis MWF.  MUSCULOSKELETAL: Patient moving all extremities well, no obvious swelling or deformities noted. Left AKA  RESPIRATORY: Airway is open and patent, respirations are spontaneous, patient has a normal effort and rate, no accessory muscle use noted.  CARDIAC: Patient has a normal rate and rhythm, no periphreal edema noted in any extremity, capillary refill < 3 seconds in all extremities  ABDOMEN: Soft and non tender to palpation, no abdominal distention noted. Bowel sounds present in all four quadrants. Nausea and vomiting  NEUROLOGIC: Eyes open spontaneously, behavior appropriate to situation, follows commands, facial expression symmetrical, bilateral hand grasp equal and even, purposeful motor response noted, normal sensation in all extremities when touched with a finger.

## 2019-11-08 NOTE — ED PROVIDER NOTES
Encounter Date: 11/8/2019       History     Chief Complaint   Patient presents with    Multiple Complaints     emesis and hemoptysis      2:42 PM  Mr. Retana is a 55 yo AAM with ESRD on dialysis MWF, L below knee amputation 2/2 osteomyelitis, dCHF, GERD, h/o multiple ICH w/o residual deficits, who presented to [facility] ED via EMS.  Patient is coming from [facility].  Since the ED for nausea and vomiting of blood.  Patient is not answering any questions but is nodding yes and no.  Is complaining of pain and has emesis bag with ground coffee ground emesis.  There is approximately 400 cc of what looks like coffee-ground emesis at bedside.          Review of patient's allergies indicates:   Allergen Reactions    Fosrenol [lanthanum] Nausea And Vomiting     Nausea and vomiting     Past Medical History:   Diagnosis Date    Amputation stump pain 9/10/2013    Aspiration pneumonia 7/27/2015    Asterixis 11/8/2016    C. difficile colitis 8/7/2015    Cholelithiasis without obstruction 8/25/2015    Chronic diastolic heart failure     2-23-17   1 - Low normal to mildly depressed left ventricular systolic function (EF 50-55%).    2 - Right ventricular enlargement with mildly depressed systolic function.    3 - Left ventricular diastolic dysfunction.    4 - Right atrial enlargement.    5 - Severe tricuspid regurgitation.    6 - Pulmonary hypertension. The estimated PA systolic pressure is 86 mmHg.    7 - Increased central venous pressure.    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    Metabolic acidosis, IAG, reduced excretion of inorganic acids     Myoclonic jerking 9/20/2016    Noncompliance with medication regimen 12/4/2018    Secondary hyperparathyroidism (of renal origin)     Secondary pulmonary hypertension 3/23/2017    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    TB lung, latent 08/25/2015    Negative Quantiferon Gold 3-23-17     Past Surgical History:   Procedure Laterality Date    COLONOSCOPY      COLONOSCOPY N/A 4/4/2017    Procedure: COLONOSCOPY;  Surgeon: Walker Stern MD;  Location: [facility] (2ND FLR);  Service: Endoscopy;  Laterality: N/A;  PA Systolic Pressure 85.56. HD Patient MWF, K+ lab prior to procedure.     ESOPHAGOGASTRODUODENOSCOPY N/A 6/12/2018    Procedure: EGD (ESOPHAGOGASTRODUODENOSCOPY);  Surgeon: Man Galicia MD;  Location: [facility] (2ND FLR);  Service: Endoscopy;  Laterality: N/A;  EGD in 8-12 weeks with Dr. Galicia on 4th floor for follow up erosive esophagitis and Mejia's surveillance.    Patient should be on Pantoprazole 40mg every 12 hours or the equivulant of another PPI    awaiting for patient to reply back regarding changing    ESOPHAGOGASTRODUODENOSCOPY N/A 3/7/2019    Procedure: EGD (ESOPHAGOGASTRODUODENOSCOPY);  Surgeon: Man Galicia MD;  [facility]  Service: Endoscopy;  Laterality: N/A;    ESOPHAGOGASTRODUODENOSCOPY N/A 9/23/2019    Procedure: EGD (ESOPHAGOGASTRODUODENOSCOPY);  Surgeon: Keanu Rainey MD;  [facility]  Service: Endoscopy;  Laterality: N/A;    ESOPHAGOGASTRODUODENOSCOPY N/A 10/2/2019    Procedure: EGD (ESOPHAGOGASTRODUODENOSCOPY);  Surgeon: Kevin De La Paz MD;  [facility]  Service: Endoscopy;  Laterality: N/A;    FOOT AMPUTATION THROUGH METATARSAL      left foot    LEG AMPUTATION THROUGH KNEE  12/18/2013    left BKA    R AVF  9/12/12    UPPER  GASTROINTESTINAL ENDOSCOPY       Family History   Problem Relation Age of Onset    Early death Mother     Kidney disease Father     Hypertension Father     Heart disease Father     Hyperlipidemia Father     Diabetes Brother     Alcohol abuse Maternal Grandmother     Diabetes Maternal Grandfather     Hypertension Sister     Melanoma Neg Hx      Social History     Tobacco Use    Smoking status: Former Smoker     Packs/day: 1.00     Years: 10.00     Pack years: 10.00    Smokeless tobacco: Never Used   Substance Use Topics    Alcohol use: No    Drug use: No     Review of Systems   Constitutional: Positive for appetite change. Negative for chills and fever.   HENT: Negative for sore throat.    Respiratory: Negative for shortness of breath.    Cardiovascular: Negative for chest pain.   Gastrointestinal: Positive for nausea and vomiting.   Genitourinary: Negative for dysuria and hematuria.   Musculoskeletal: Negative for back pain.   Skin: Negative for rash.   Neurological: Negative for weakness and headaches.   Hematological: Does not bruise/bleed easily.       Physical Exam     Initial Vitals [11/08/19 1419]   BP Pulse Resp Temp SpO2   (!) 140/80 90 18 97.9 °F (36.6 °C) 98 %      MAP       --         Physical Exam    Nursing note and vitals reviewed.  Constitutional: He appears well-developed. He has a sickly appearance.   HENT:   Head: Normocephalic and atraumatic.   Nose: Nose normal.   Eyes: Conjunctivae and EOM are normal.   Neck: Normal range of motion.   Cardiovascular: Normal rate.   Pulmonary/Chest: Breath sounds normal. No respiratory distress. He has no wheezes. He has no rales.   Abdominal: Soft. He exhibits no distension and no mass. There is no tenderness. There is no rigidity, no rebound and no guarding.   400 cc of coffee ground emesis at bedside.    Neurological: He is alert and oriented to person, place, and time. No sensory deficit.   Skin: Skin is warm and dry. No erythema.         ED  Course   Procedures  Labs Reviewed   CBC W/ AUTO DIFFERENTIAL - Abnormal; Notable for the following components:       Result Value    RBC 4.47 (*)     Hemoglobin 13.4 (*)     Mean Corpuscular Volume 99 (*)     Mean Corpuscular Hemoglobin Conc 30.4 (*)     RDW 19.8 (*)     Gran% 75.0 (*)     Lymph% 11.3 (*)     All other components within normal limits   COMPREHENSIVE METABOLIC PANEL - Abnormal; Notable for the following components:    Creatinine 4.0 (*)     Calcium 8.4 (*)     Albumin 2.9 (*)     Alkaline Phosphatase 257 (*)     eGFR if  18.2 (*)     eGFR if non  15.8 (*)     All other components within normal limits   TROPONIN I - Abnormal; Notable for the following components:    Troponin I 0.115 (*)     All other components within normal limits    Narrative:     ADD-ON TROP #[id number] PER QIANA WASHINGTON MD 18:27  11/08/2019    ISTAT PROCEDURE - Abnormal; Notable for the following components:    POC Glucose 119 (*)     POC Creatinine 4.3 (*)     POC TCO2 (MEASURED) 37 (*)     All other components within normal limits   TROPONIN I   TYPE & SCREEN   ISTAT CHEM8          Imaging Results          CT Head Without Contrast (Final result)  Result time 11/08/19 20:38:10    Final result by Gopal Laboy MD (11/08/19 20:38:10)                 Impression:      Motion limited examination.  No evidence of acute hemorrhage or major vascular distribution infarct.    Generalized cerebral volume loss and chronic microvascular ischemic changes.    Remote lacunar-type infarcts of the right basal ganglia and right thalamus.    Electronically signed by resident: Efraín Jefferson  Date:    11/08/2019  Time:    20:29    Electronically signed by: Gopal Laboy MD  Date:    11/08/2019  Time:    20:38             Narrative:    EXAMINATION:  CT HEAD WITHOUT CONTRAST    CLINICAL HISTORY:  Confusion/delirium, altered LOC, unexplained;    TECHNIQUE:  Low dose axial CT images obtained throughout the head  without the use of intravenous contrast.  Axial, sagittal and coronal reconstructions were performed.  Examination markedly degraded by patient motion artifact.    COMPARISON:  CT head 04/03/2018, 07/26/2017, 07/24/2017.    FINDINGS:  Prominence of the ventricles and sulci compatible with generalized cerebral volume loss.  No hydrocephalus.    Confluent periventricular white matter hypoattenuation suggestive of chronic microvascular ischemic changes.  Remote lacunar type infarcts of the right thalamus and right basal ganglia    No parenchymal mass, hemorrhage, edema or major vascular distribution infarct.    No extra-axial blood or fluid collections.    Skull/extracranial contents (limited evaluation):    No acute fracture.  Mastoid air cells and paranasal sinuses are essentially clear.                               X-Ray Chest 1 View (Final result)  Result time 11/08/19 18:10:34   Procedure changed from X-Ray Chest PA And Lateral     Final result by Jose Manuel Zheng MD (11/08/19 18:10:34)                 Impression:      1. No acute cardiopulmonary process appreciated.      Electronically signed by: Jose Manuel Zheng  Date:    11/08/2019  Time:    18:10             Narrative:    EXAMINATION:  XR CHEST 1 VIEW    CLINICAL HISTORY:  HEMOPTYSIS; Hemoptysis    TECHNIQUE:  Single PA view of the chest    COMPARISON:  Chest radiograph 09/02/2019    FINDINGS:  Cardiomediastinal silhouette is within normal limits.    No focal consolidation, overt interstitial edema, sizable pleural effusion or pneumothorax.    Mild multilevel degenerative changes of the imaged spine.                                 Medical Decision Making:   History:   Old Medical Records: I decided to obtain old medical records.  Old Records Summarized: records from previous admission(s).  Initial Assessment:   Mr. Retana is a 55 yo AAM with ESRD on dialysis MWF, L below knee amputation 2/2 osteomyelitis, dCHF, GERD, h/o multiple ICH w/o residual deficits, who  presented to [facility] ED via EMS for nausea and vomiting.  Differential Diagnosis:   Includes but is not limited to upper GI bleed, anemia, less likely Jennie-Arellano tear, gastritis, esophagitis, ACS, intracerebral hemorrhage given profuse nausea and vomiting and history of ICH.  Clinical Tests:   Lab Tests: Ordered and Reviewed  Radiological Study: Ordered and Reviewed  ED Management:  CBC with no leukocytosis.  No anemia.  Platelets within normal limits at 163.  CMP with hypocalcemia.  No other electrolyte abnormalities.  CT head with no evidence of acute hemorrhage or major vascular distribution.  Chest x-ray was no acute cardiopulmonary process.  Low suspicion for active TB.  Will discontinue contact isolation.    He has a history of erosive esophagitis on past endoscopy.  Given history, physical exam, and workup today, patient most likely has upper GI bleed with intractable nausea and vomiting.  Protonix IV given already.  Case discussed with Hospital Medicine who will place in observation.  Other:   I have discussed this case with another health care provider.    I have reviewed patient's chart and discussed this case with my supervising MD.     Yenny Mark PA-C  Emergent Department  [facility]  [facility]ralink #[id number] or #[id number]                                   Clinical Impression:       ICD-10-CM ICD-9-CM   1. Upper GI bleed K92.2 578.9   2. Hemoptysis R04.2 786.30   3. Hyperkalemia E87.5 276.7   4. Hematemesis with nausea K92.0 578.0     787.02         Disposition:   Disposition: Placed in Observation  Condition: Fair                     Yenny Mark PA-C  11/08/19 0529

## 2019-11-08 NOTE — ED TRIAGE NOTES
Patient presents to ED via [facility] from [facility] with c/o N/V and abdomen pain. Pt has dark brown vomit noted in container. Hx of TB. Patient is lethargic and only answers yes or no questions.

## 2019-11-09 PROBLEM — R10.9 ABDOMINAL PAIN: Status: RESOLVED | Noted: 2018-03-18 | Resolved: 2019-01-01

## 2019-11-09 PROBLEM — K92.2 UPPER GI BLEED: Status: ACTIVE | Noted: 2019-01-01

## 2019-11-09 PROBLEM — R04.2 HEMOPTYSIS: Status: RESOLVED | Noted: 2018-12-04 | Resolved: 2019-01-01

## 2019-11-09 PROBLEM — N19 UREMIA: Status: RESOLVED | Noted: 2019-01-01 | Resolved: 2019-01-01

## 2019-11-09 PROBLEM — I82.409 DVT (DEEP VENOUS THROMBOSIS): Status: RESOLVED | Noted: 2017-07-28 | Resolved: 2019-01-01

## 2019-11-09 NOTE — CONSULTS
[facility]  Gastroenterology  Consult Note    Patient Name: Vaughn Retana  MRN: [id number]  Admission Date: 11/8/2019  Hospital Length of Stay: 0 days  Code Status: Full Code   Attending Provider: Susan Turcios MD   Consulting Provider: Sotero Ojeda MD  Primary Care Physician: Primary Doctor No  Principal Problem:Chronic upper GI bleeding    Inpatient consult to Gastroenterology  Consult performed by: Sotero Ojeda MD  Consult ordered by: Fabio Carrington MD        Subjective:     HPI: Vaughn Retana is a 54 y.o. 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     Recent hospitalizations on 9/20-9/23, concern for UGIB, underwent EGD, showed hiatal hernia and grade D esophagitis, was discharged on PPI BID and carafate. Returned to ED on 9/29, with nausea and vomiting, which resolved with antiemetic therapy. Returned to hospital on 9/30, with concern of coffee ground emesis, hb drop from 10 to 8.7, EGD done on 10/2/19, showed Grade D reflux esophagitis no active GI bleed, recommended PPI 40 BID and carafate, there was plan for colonoscopy soon given he would need one in 2020. He went home on PPI BID and carafate. He presents again to the ED from [facility] with complaint of hematemesis. Patient in the ED had emesis bag full off 400cc of coffee ground emesis. He is hypertensive, hb is 13.4 (baseline around 9). He has no further episodes this AM. He denies black stools. He denies NSAID use.         PEndoHx  10/2/19: EGD grade D esophagitis.   9/23/19 EGD Gr D esophagitis, medium hiatal hernia  3/7/19 EGD Gr D esophagitis  5/22/18 EGD LA Gr D esophagitis  3/16/18 EGD gastric polyp. Gr C esophagitis  4/4/17 Colonoscopy. Fair prep. 3mm transverse polyp, 8mm sigmoid polyp. F/u 3 years.         Past Medical History:   Diagnosis  Date    Amputation stump pain 9/10/2013    Aspiration pneumonia 7/27/2015    Asterixis 11/8/2016    C. difficile colitis 8/7/2015    Cholelithiasis without obstruction 8/25/2015    Chronic diastolic heart failure     2-23-17   1 - Low normal to mildly depressed left ventricular systolic function (EF 50-55%).    2 - Right ventricular enlargement with mildly depressed systolic function.    3 - Left ventricular diastolic dysfunction.    4 - Right atrial enlargement.    5 - Severe tricuspid regurgitation.    6 - Pulmonary hypertension. The estimated PA systolic pressure is 86 mmHg.    7 - Increased central venous pressure.    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    Metabolic acidosis, IAG, reduced excretion of inorganic acids     Myoclonic jerking 9/20/2016    Noncompliance with medication regimen 12/4/2018    Secondary hyperparathyroidism (of renal origin)     Secondary pulmonary hypertension 3/23/2017    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    TB lung, latent 08/25/2015    Negative Quantiferon Gold 3-23-17       Past Surgical History:   Procedure Laterality Date    COLONOSCOPY      COLONOSCOPY N/A 4/4/2017    Procedure: COLONOSCOPY;  Surgeon: Walker Stern MD;  Location: [facility] ([address] [place] [address]);  Service: Endoscopy;  Laterality: N/A;  PA Systolic Pressure 85.56. HD Patient MWF, K+ lab prior to procedure.     ESOPHAGOGASTRODUODENOSCOPY N/A 6/12/2018    Procedure:  EGD (ESOPHAGOGASTRODUODENOSCOPY);  Surgeon: Man Galicia MD;  [facility]  Service: Endoscopy;  Laterality: N/A;  EGD in 8-12 weeks with Dr. Galicia on 4th floor for follow up erosive esophagitis and Mejia's surveillance.    Patient should be on Pantoprazole 40mg every 12 hours or the equivulant of another PPI    awaiting for patient to reply back regarding changing    ESOPHAGOGASTRODUODENOSCOPY N/A 3/7/2019    Procedure: EGD (ESOPHAGOGASTRODUODENOSCOPY);  Surgeon: Man Galicia MD;  [facility]  Service: Endoscopy;  Laterality: N/A;    ESOPHAGOGASTRODUODENOSCOPY N/A 9/23/2019    Procedure: EGD (ESOPHAGOGASTRODUODENOSCOPY);  Surgeon: Keanu Rainey MD;  [facility]  Service: Endoscopy;  Laterality: N/A;    ESOPHAGOGASTRODUODENOSCOPY N/A 10/2/2019    Procedure: EGD (ESOPHAGOGASTRODUODENOSCOPY);  Surgeon: Kevin De La Paz MD;  [facility]  Service: Endoscopy;  Laterality: N/A;    FOOT AMPUTATION THROUGH METATARSAL      left foot    LEG AMPUTATION THROUGH KNEE  12/18/2013    left BKA    R AVF  9/12/12    UPPER GASTROINTESTINAL ENDOSCOPY         Family History   Problem Relation Age of Onset    Early death Mother     Kidney disease Father     Hypertension Father     Heart disease Father     Hyperlipidemia Father     Diabetes Brother     Alcohol abuse Maternal Grandmother     Diabetes Maternal Grandfather     Hypertension Sister     Melanoma Neg Hx        Social History     Socioeconomic History    Marital status:      Spouse name: Not on file    Number of children: Not on file    Years of education: Not on file    Highest education level: Not on file   Occupational History    Not on file   Social Needs    Financial resource strain: Not on file    Food insecurity:     Worry: Not on file     Inability: Not on file    Transportation needs:     Medical: Not on file     Non-medical: Not on file   Tobacco Use     Smoking status: Former Smoker     Packs/day: 1.00     Years: 10.00     Pack years: 10.00    Smokeless tobacco: Never Used   Substance and Sexual Activity    Alcohol use: No    Drug use: No    Sexual activity: Yes     Partners: Female     Birth control/protection: None   Lifestyle    Physical activity:     Days per week: Not on file     Minutes per session: Not on file    Stress: Not on file   Relationships    Social connections:     Talks on phone: Not on file     Gets together: Not on file     Attends Zoroastrian service: Not on file     Active member of club or organization: Not on file     Attends meetings of clubs or organizations: Not on file     Relationship status: Not on file   Other Topics Concern    Not on file   Social History Narrative    Not on file       No current facility-administered medications on file prior to encounter.      Current Outpatient Medications on File Prior to Encounter   Medication Sig Dispense Refill    acetaminophen (TYLENOL) 325 MG tablet Take 2 tablets (650 mg total) by mouth every 8 (eight) hours as needed.  0    carvedilol (COREG) 3.125 MG tablet Take 1 tablet (3.125 mg total) by mouth 2 (two) times daily with meals. 60 tablet 11    metoclopramide HCl (REGLAN) 10 MG tablet Take 1 tablet (10 mg total) by mouth 3 (three) times daily before meals. 90 tablet 1    midodrine (PROAMATINE) 5 MG Tab Please take 30 min before dialysis on Monday Wednesday and Friday 60 tablet 3    ondansetron (ZOFRAN) 8 MG tablet Take 1 tablet (8 mg total) by mouth every 12 (twelve) hours as needed for Nausea. 60 tablet 1    pantoprazole (PROTONIX) 40 MG tablet Take 1 tablet (40 mg total) by mouth 2 (two) times daily. 60 tablet 6    RENAPLEX-D 800 mcg-12.5 mg -2,000 unit Tab Take 1 tablet by mouth once daily.  3    sevelamer carbonate (RENVELA) 800 mg Tab Take 1 tablet (800 mg total) by mouth 3 (three) times daily with meals. 90 tablet 3    sucralfate (CARAFATE) 100 mg/mL suspension  Take 10 mLs (1 g total) by mouth 4 (four) times daily before meals and nightly. 420 mL 2       Review of patient's allergies indicates:   Allergen Reactions    Fosrenol [lanthanum] Nausea And Vomiting     Nausea and vomiting       Review of Systems:   Constitutional: no fever, chills or change in weight   Eyes: no visual changes   ENT: no sore throat or dysphagia  Respiratory: no cough or shortness of breath   Cardiovascular: no chest pain or palpitations   Gastrointestinal: as per HPI  Hematologic/Lymphatic: no easy bruising or lymphadenopathy   Musculoskeletal: no arthralgias or myalgias   Neurological: no change in mental status  Behavioral/Psych: no change in mood    Objective:     Vitals:    11/09/19 0600   BP:    Pulse: 79   Resp:    Temp:        General: Alert and Oriented, no distress  HEENT: Normocephalic, Atraumatic. No scleral icterus.  Resp: Good air entry bilaterally, no adventitious sounds  Cardiac: S1 and S2 normal  Abdomen: Normoactive bowel sounds. Non-distended. Normal tympany. Soft. Non-tender. No peritoneal signs.  Extremities: No peripheral edema.   Neurologic: No gross neurological Deficits  Psych: Calm, cooperative. Normal mood and affect.    Significant Labs:  Recent Labs   Lab 11/08/19  1515   HGB 13.4*       Lab Results   Component Value Date    WBC 8.94 11/08/2019    HGB 13.4 (L) 11/08/2019    HCT 37 11/08/2019    MCV 99 (H) 11/08/2019     11/08/2019       Lab Results   Component Value Date     11/08/2019    K 4.3 11/08/2019     11/08/2019    CO2 28 11/08/2019    BUN 16 11/08/2019    CREATININE 4.0 (H) 11/08/2019    CALCIUM 8.4 (L) 11/08/2019    ANIONGAP 13 11/08/2019    ESTGFRAFRICA 18.2 (A) 11/08/2019    EGFRNONAA 15.8 (A) 11/08/2019       Lab Results   Component Value Date    ALT 17 11/08/2019    AST 28 11/08/2019    ALKPHOS 257 (H) 11/08/2019    BILITOT 0.4 11/08/2019       Lab Results   Component Value Date    INR 1.0 10/01/2019    INR 1.1 09/30/2019    INR 1.0  09/21/2019       Significant Imaging:  Reviewed pertinent radiology findings.       Assessment/Plan:     Vaughn Retana is a 54 y.o. 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     Recent hospitalizations on 9/20-9/23, concern for UGIB, underwent EGD, showed hiatal hernia and grade D esophagitis, was discharged on PPI BID and carafate. Returned to ED on 9/29, with nausea and vomiting, which resolved with antiemetic therapy. Returned to hospital on 9/30, with concern of coffee ground emesis, hb drop from 10 to 8.7, EGD done on 10/2/19, showed Grade D reflux esophagitis no active GI bleed, recommended PPI 40 BID and carafate, there was plan for colonoscopy soon given he would need one in 2020. He went home on PPI BID and carafate. He presents again to the ED from [facility] with complaint of hematemesis. Patient in the ED had emesis bag full off 400cc of coffee ground emesis. He is hypertensive, hb is 13.4 (baseline around 9). He has no further episodes this AM. He denies black stools. He denies NSAID use.       Problem List:  1. Coffee ground hematemesis  2. Hx of gastroparesis per emptying study imaging on 9/5/19    Plan:  -Place patient NPO  -Place 2 large bore IVs, volume resuscitation per primary  -Transfuse pRBC for Hb < 7 g/dL (Consider a higher Hb target if there is clinical evidence of intravascular volume depletion or comorbidities, such as CAD or if high suspicion of vigorous active ongoing bleeding or an uncorrected coagulopathy exists.).  -Correct coagulopathy (goal plt >50, INR <1.5) if present in patients without absolute contraindications.  -PPI 80 mg IV bolus once, then 8 mg/hour infusion or IV PPI 40 BID  -Avoid nonsteroidal agents, antiplatelet agents and anticoagulants if possible in patients without absolute  contraindications  -Please call with any additional questions, concerns or changes in the patient's clinical status.  -correct fluid and electrolyte imbalances and nutritional deficiencies  -full liquid diet today, clear liquid diet tomorrow, NPO from midnight 11/11, colon prep tomorrow. EGD/colonoscopy 11/11  -dietary management              -eating small, low-fat, low-fiber meals 4-5 times/day              -copious non carbonated fluid intake              -may need referral to a dietitian  -consider metoclopramide usual starting dose is 5 mg TID (30 minutes prior to meals and add at bedtime PRN), titrated up as tolerated to maximum of 40 mg/day, with addition of evening dose as indicated, Other prokinetic agents may include erythromycin.   -can use other antiemetic medications such as phenothiazines, antihistamines, or Zofran alone or in conjunction with prokinetic agents.   (Metoclopramide is FDA-approved for gastroparesis but has black box warning about risk for tardive dyskinesia.)      High-quality evidence per [employer]      Thank you for involving us in the care of Vaughn Retana. Please call with any additional questions, concerns or changes in the patient's clinical status.    Sotero Ojeda MD  Gastroenterology Fellow PGY IV   [facility]

## 2019-11-09 NOTE — HPI
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 The patient was recently admitted on 9/28 with similar complaints. During that hospitalization, he was transfused multiple units of blood and EGD which revealed a 3cm hiatal hernia with LA Grade D esophagitis. In the ED, his labs were significant for: Hb 13.4 (bl 7-9). Cr 4 (bl 4-5). Trop 0.115. CXR and CThead with no acute findings. He was also hypertensive on admission. He received protonix 80 IV, carvedilol 3.125, and metoclopramide IV 10 in ED. He was admitted for a GI evaluation. The patient is a MWF dialysis patient of Dr. Lemus at [facility]. According to his OP unit, the patient last dialyze on yesterday, 11/8, prior to his admission here. He remains nauseated on exam. No shortness of breath or CP. Nephrology consulted for management of ESRD and HD treatment.

## 2019-11-09 NOTE — PLAN OF CARE
POC  reviewed. Pt verbalized understanding. No acute events this shift. Bed in low position. Call bell within reach. Side rails up x 2. Nadn; will monitor.

## 2019-11-09 NOTE — PROGRESS NOTES
Dialysis completed. Needles removed from right upper arm fistula with pressure held to sites for 7 minutes with hemostasis achieved. Gauze and tape applied. Patient dialyzed for 3.5 hours with fluid removal of 1 liter. Tolerated well. Patient left dialysis unit back to room via stretcher with transport.

## 2019-11-09 NOTE — CONSULTS
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 The patient was recently admitted on 9/28 with similar complaints. During that hospitalization, he was transfused multiple units of blood and EGD which revealed a 3cm hiatal hernia with LA Grade D esophagitis. In the ED, his labs were significant for: Hb 13.4 (bl 7-9). Cr 4 (bl 4-5). Trop 0.115. CXR and CThead with no acute findings. He was also hypertensive on admission. He received protonix 80 IV, carvedilol 3.125, and metoclopramide IV 10 in ED. He was admitted for a GI evaluation. The patient is a MWF dialysis patient of Dr. Lemus at [facility]. According to his OP unit, the patient last dialyze on yesterday, 11/8, prior to his admission here. He remains nauseated on exam. No shortness of breath or CP. Nephrology consulted for management of ESRD and HD treatment.      Past Medical History:   Diagnosis Date    Amputation stump pain 9/10/2013    Aspiration pneumonia 7/27/2015    Asterixis 11/8/2016    C. difficile colitis 8/7/2015    Cholelithiasis without obstruction 8/25/2015    Chronic diastolic heart failure     2-23-17   1 - Low normal to mildly depressed left ventricular systolic function (EF 50-55%).    2 -  Right ventricular enlargement with mildly depressed systolic function.    3 - Left ventricular diastolic dysfunction.    4 - Right atrial enlargement.    5 - Severe tricuspid regurgitation.    6 - Pulmonary hypertension. The estimated PA systolic pressure is 86 mmHg.    7 - Increased central venous pressure.    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    Metabolic acidosis, IAG, reduced excretion of inorganic acids     Myoclonic jerking 9/20/2016    Noncompliance with medication regimen 12/4/2018    Secondary hyperparathyroidism (of renal origin)     Secondary pulmonary hypertension 3/23/2017    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    TB lung, latent 08/25/2015    Negative Quantiferon Gold 3-23-17       Past Surgical History:   Procedure Laterality Date    COLONOSCOPY      COLONOSCOPY N/A 4/4/2017    Procedure: COLONOSCOPY;  Surgeon: Walker Stern MD;  Location: [facility] ([address] [place] [address]);  Service: Endoscopy;  Laterality: N/A;  PA Systolic Pressure 85.56. HD Patient MWF, K+ lab prior to procedure.     ESOPHAGOGASTRODUODENOSCOPY N/A 6/12/2018    Procedure: EGD (ESOPHAGOGASTRODUODENOSCOPY);  Surgeon: Man Galicia MD;  Location: [facility] ([address] [place] [address]);  Service: Endoscopy;  Laterality: N/A;  EGD in 8-12 weeks with Dr. Galicia on 4th floor for follow up erosive esophagitis and Mejia's surveillance.    Patient should be on Pantoprazole  40mg every 12 hours or the equivulant of another PPI    awaiting for patient to reply back regarding changing    ESOPHAGOGASTRODUODENOSCOPY N/A 3/7/2019    Procedure: EGD (ESOPHAGOGASTRODUODENOSCOPY);  Surgeon: Man Galicia MD;  Location: [facility] (2ND FLR);  Service: Endoscopy;  Laterality: N/A;    ESOPHAGOGASTRODUODENOSCOPY N/A 9/23/2019    Procedure: EGD (ESOPHAGOGASTRODUODENOSCOPY);  Surgeon: Keanu Rainey MD;  [facility]  Service: Endoscopy;  Laterality: N/A;    ESOPHAGOGASTRODUODENOSCOPY N/A 10/2/2019    Procedure: EGD (ESOPHAGOGASTRODUODENOSCOPY);  Surgeon: Kevin De La Paz MD;  [facility]  Service: Endoscopy;  Laterality: N/A;    FOOT AMPUTATION THROUGH METATARSAL      left foot    LEG AMPUTATION THROUGH KNEE  12/18/2013    left BKA    R AVF  9/12/12    UPPER GASTROINTESTINAL ENDOSCOPY         Review of patient's allergies indicates:   Allergen Reactions    Fosrenol [lanthanum] Nausea And Vomiting     Nausea and vomiting     Current Facility-Administered Medications   Medication Frequency    0.9%  NaCl infusion Once    acetaminophen tablet 650 mg Q4H PRN    carvedilol tablet 3.125 mg BID    ondansetron disintegrating tablet 8 mg Q8H PRN    [START ON 11/10/2019] pantoprazole injection 40 mg BID    [START ON 11/10/2019] polyethylene glycol (GoLYTELY) solution Once    ramelteon tablet 8 mg Nightly PRN    senna-docusate 8.6-50 mg per tablet 1 tablet BID    sodium chloride 0.9% flush 10 mL PRN     Family History     Problem Relation (Age of Onset)    Alcohol abuse Maternal Grandmother    Diabetes Brother, Maternal Grandfather    Early death Mother    Heart disease Father    Hyperlipidemia Father    Hypertension Father, Sister    Kidney disease Father        Tobacco Use    Smoking status: Former Smoker     Packs/day: 1.00     Years: 10.00     Pack years: 10.00    Smokeless tobacco: Never Used   Substance and Sexual Activity    Alcohol use: No     Drug use: No    Sexual activity: Yes     Partners: Female     Birth control/protection: None     Review of Systems   Constitutional: Negative for chills and fever.   HENT: Negative for hearing loss.    Eyes: Negative for visual disturbance.   Respiratory: Negative for cough and shortness of breath.    Cardiovascular: Negative for chest pain and leg swelling.   Gastrointestinal: Positive for abdominal pain and vomiting.   Genitourinary: Positive for decreased urine volume. Negative for dysuria and hematuria.   Musculoskeletal: Positive for gait problem.   Skin: Negative for color change and pallor.   Neurological: Negative for numbness and headaches.   Psychiatric/Behavioral: Positive for decreased concentration. Negative for agitation.     Objective:     Vital Signs (Most Recent):  Temp: 98 °F (36.7 °C) (11/09/19 0855)  Pulse: 81 (11/09/19 0945)  Resp: 18 (11/09/19 0915)  BP: (!) 185/98 (11/09/19 0945)  SpO2: 100 % (11/09/19 0855)  O2 Device (Oxygen Therapy): room air (11/09/19 0855) Vital Signs (24h Range):  Temp:  [97.9 °F (36.6 °C)-98.8 °F (37.1 °C)] 98 °F (36.7 °C)  Pulse:  [75-94] 81  Resp:  [18] 18  SpO2:  [96 %-100 %] 100 %  BP: (135-199)/() 185/98     Weight: 57 kg (125 lb 10.6 oz) (11/08/19 2242)  Body mass index is 19.68 kg/m².  Body surface area is 1.64 meters squared.    I/O last 3 completed shifts:  In: 500 [IV Piggyback:500]  Out: -     Physical Exam   Constitutional: He appears well-developed. No distress.   HENT:   Head: Normocephalic and atraumatic.   Eyes: Conjunctivae and EOM are normal. No scleral icterus.   Neck: Normal range of motion. Neck supple.   Cardiovascular: Normal rate, regular rhythm, normal heart sounds and intact distal pulses.   Pulmonary/Chest: Effort normal and breath sounds normal. No respiratory distress. He has no wheezes. He has no rales.   Abdominal: Soft. He exhibits no distension. There is no tenderness. There is no rebound and no guarding.   Musculoskeletal:  Normal range of motion. He exhibits no edema.   Neurological: He is alert.   Skin: Skin is warm and dry. He is not diaphoretic.   Psychiatric: He has a normal mood and affect. His behavior is normal. Judgment and thought content normal.       Significant Labs:  CBC:   Recent Labs   Lab 11/09/19  0930   WBC 6.82   RBC 3.57*   HGB 10.4*   HCT 34.3*      MCV 96   MCH 29.1   MCHC 30.3*     CMP:   Recent Labs   Lab 11/08/19  1622      CALCIUM 8.4*   ALBUMIN 2.9*   PROT 8.2      K 4.3   CO2 28      BUN 16   CREATININE 4.0*   ALKPHOS 257*   ALT 17   AST 28   BILITOT 0.4           Assessment/Plan:     * Gastrointestinal hemorrhage with hematemesis  - Being managed by primary team     ESRD on hemodialysis  The patient is 55 yo AA male admitted with complaints of coffee-grind emesis. Hgb stable at 13.4 on admit, now 10.4. Admitted for a GI evaluation.      RICHARD AVF  C-Deckbar   3.5 hrs   ?EDW   Dr. Lemus     Plan:   - Will provide dialysis today for metabolic clearance and volume management per outpatient schedule, target UF 1-2 L as tolerated, keep MAP >65.  - Seen on HD this morning. Tolerating treatment well.  - Dialysate adjusted to current labs  - Patient hypertensive. HD will help with volume removal and HTN management  - Restart outpatient hypertensive regimen   - Will obtain outpatient HD records   - Recommend renal diet when appropriate   - Hgb 10.4, will hold JONAS therapy   - Continue to monitor intake and output, daily weights   - Avoid nephrotoxic medication and renal dose medications to GFR  - Will discuss with staff      Thank you for your consult. I will follow-up with patient. Please contact us if you have any additional questions.    Glenis Benavidez DNP, FNP-C  Nephrology  [facility]

## 2019-11-09 NOTE — SUBJECTIVE & OBJECTIVE
Past Medical History:   Diagnosis Date    Amputation stump pain 9/10/2013    Aspiration pneumonia 7/27/2015    Asterixis 11/8/2016    C. difficile colitis 8/7/2015    Cholelithiasis without obstruction 8/25/2015    Chronic diastolic heart failure     2-23-17   1 - Low normal to mildly depressed left ventricular systolic function (EF 50-55%).    2 - Right ventricular enlargement with mildly depressed systolic function.    3 - Left ventricular diastolic dysfunction.    4 - Right atrial enlargement.    5 - Severe tricuspid regurgitation.    6 - Pulmonary hypertension. The estimated PA systolic pressure is 86 mmHg.    7 - Increased central venous pressure.    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    Metabolic acidosis, IAG, reduced excretion of inorganic acids     Myoclonic jerking 9/20/2016    Noncompliance with medication regimen 12/4/2018    Secondary hyperparathyroidism (of renal origin)     Secondary pulmonary hypertension 3/23/2017    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    TB lung, latent 08/25/2015    Negative Quantiferon Gold 3-23-17       Past Surgical History:   Procedure Laterality Date    COLONOSCOPY      COLONOSCOPY N/A 4/4/2017    Procedure: COLONOSCOPY;  Surgeon: Walker Stern MD;  Location: [facility] ([address] [place] [address]);  Service: Endoscopy;  Laterality: N/A;  PA Systolic Pressure 85.56. HD Patient MWF, K+ lab  prior to procedure.     ESOPHAGOGASTRODUODENOSCOPY N/A 6/12/2018    Procedure: EGD (ESOPHAGOGASTRODUODENOSCOPY);  Surgeon: Man Galicia MD;  Location: [facility] (2ND FLR);  Service: Endoscopy;  Laterality: N/A;  EGD in 8-12 weeks with Dr. Galicia on 4th floor for follow up erosive esophagitis and Mejia's surveillance.    Patient should be on Pantoprazole 40mg every 12 hours or the equivulant of another PPI    awaiting for patient to reply back regarding changing    ESOPHAGOGASTRODUODENOSCOPY N/A 3/7/2019    Procedure: EGD (ESOPHAGOGASTRODUODENOSCOPY);  Surgeon: Man Galicia MD;  Location: [facility] (2ND FLR);  Service: Endoscopy;  Laterality: N/A;    ESOPHAGOGASTRODUODENOSCOPY N/A 9/23/2019    Procedure: EGD (ESOPHAGOGASTRODUODENOSCOPY);  Surgeon: Keanu Rainey MD;  Location: [facility] (2ND FLR);  Service: Endoscopy;  Laterality: N/A;    ESOPHAGOGASTRODUODENOSCOPY N/A 10/2/2019    Procedure: EGD (ESOPHAGOGASTRODUODENOSCOPY);  Surgeon: Kevin De La Paz MD;  Location: [facility] (2ND FLR);  Service: Endoscopy;  Laterality: N/A;    FOOT AMPUTATION THROUGH METATARSAL      left foot    LEG AMPUTATION THROUGH KNEE  12/18/2013    left BKA    R AVF  9/12/12    UPPER GASTROINTESTINAL ENDOSCOPY         Review of patient's allergies indicates:   Allergen Reactions    Fosrenol [lanthanum] Nausea And Vomiting     Nausea and vomiting     Current Facility-Administered Medications   Medication Frequency    0.9%  NaCl infusion Once    acetaminophen tablet 650 mg Q4H PRN    carvedilol tablet 3.125 mg BID    ondansetron disintegrating tablet 8 mg Q8H PRN    [START ON 11/10/2019] pantoprazole injection 40 mg BID    [START ON 11/10/2019] polyethylene glycol (GoLYTELY) solution Once    ramelteon tablet 8 mg Nightly PRN    senna-docusate 8.6-50 mg per tablet 1 tablet BID    sodium chloride 0.9% flush 10 mL PRN     Family History     Problem Relation (Age of Onset)    Alcohol abuse Maternal Grandmother     Diabetes Brother, Maternal Grandfather    Early death Mother    Heart disease Father    Hyperlipidemia Father    Hypertension Father, Sister    Kidney disease Father        Tobacco Use    Smoking status: Former Smoker     Packs/day: 1.00     Years: 10.00     Pack years: 10.00    Smokeless tobacco: Never Used   Substance and Sexual Activity    Alcohol use: No    Drug use: No    Sexual activity: Yes     Partners: Female     Birth control/protection: None     Review of Systems   Constitutional: Negative for chills and fever.   HENT: Negative for hearing loss.    Eyes: Negative for visual disturbance.   Respiratory: Negative for cough and shortness of breath.    Cardiovascular: Negative for chest pain and leg swelling.   Gastrointestinal: Positive for abdominal pain and vomiting.   Genitourinary: Positive for decreased urine volume. Negative for dysuria and hematuria.   Musculoskeletal: Positive for gait problem.   Skin: Negative for color change and pallor.   Neurological: Negative for numbness and headaches.   Psychiatric/Behavioral: Positive for decreased concentration. Negative for agitation.     Objective:     Vital Signs (Most Recent):  Temp: 98 °F (36.7 °C) (11/09/19 0855)  Pulse: 81 (11/09/19 0945)  Resp: 18 (11/09/19 0915)  BP: (!) 185/98 (11/09/19 0945)  SpO2: 100 % (11/09/19 0855)  O2 Device (Oxygen Therapy): room air (11/09/19 0855) Vital Signs (24h Range):  Temp:  [97.9 °F (36.6 °C)-98.8 °F (37.1 °C)] 98 °F (36.7 °C)  Pulse:  [75-94] 81  Resp:  [18] 18  SpO2:  [96 %-100 %] 100 %  BP: (135-199)/() 185/98     Weight: 57 kg (125 lb 10.6 oz) (11/08/19 2242)  Body mass index is 19.68 kg/m².  Body surface area is 1.64 meters squared.    I/O last 3 completed shifts:  In: 500 [IV Piggyback:500]  Out: -     Physical Exam   Constitutional: He appears well-developed. No distress.   HENT:   Head: Normocephalic and atraumatic.   Eyes: Conjunctivae and EOM are normal. No scleral icterus.   Neck: Normal range  of motion. Neck supple.   Cardiovascular: Normal rate, regular rhythm, normal heart sounds and intact distal pulses.   Pulmonary/Chest: Effort normal and breath sounds normal. No respiratory distress. He has no wheezes. He has no rales.   Abdominal: Soft. He exhibits no distension. There is no tenderness. There is no rebound and no guarding.   Musculoskeletal: Normal range of motion. He exhibits no edema.   Neurological: He is alert.   Skin: Skin is warm and dry. He is not diaphoretic.   Psychiatric: He has a normal mood and affect. His behavior is normal. Judgment and thought content normal.       Significant Labs:  CBC:   Recent Labs   Lab 11/09/19  0930   WBC 6.82   RBC 3.57*   HGB 10.4*   HCT 34.3*      MCV 96   MCH 29.1   MCHC 30.3*     CMP:   Recent Labs   Lab 11/08/19  1622      CALCIUM 8.4*   ALBUMIN 2.9*   PROT 8.2      K 4.3   CO2 28      BUN 16   CREATININE 4.0*   ALKPHOS 257*   ALT 17   AST 28   BILITOT 0.4

## 2019-11-09 NOTE — H&P
[facility] Hospital Medicine  History & Physical    Patient Name: Vaughn Retana  MRN: [id number]  Admission Date: 11/8/2019  Attending Physician: Susan Turcios MD   Primary Care Provider: [facility] Medicine Team: [facility] [place] [facility] 2 Fabio Carrington MD     Patient information was obtained from patient and ER records.     Subjective:     Principal Problem:Chronic upper GI bleeding    Chief Complaint:   Chief Complaint   Patient presents with    Multiple Complaints     emesis and hemoptysis         HPI: Mr. 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 Patient did no cooperate with interview for ED or medicine physicians as well as with nursing staff. He will occasionally nod head or grunt in response and will only occasionally follow commands, however patient was noted to be complaining of pain in ED and has an emesis bag with appx 400cc of ground coffee ground emesis.     Of note, patient's most recent admission for the same problem was on 9/28 with numerous other visits before that date. Patient was transfused multiple units of blood on that admission and was discharged following treatment with protonix and sucralfate and after an EGD revealed a 3cm hiatal hernia with LA Grade D esophagitis. Of note, EGD from 9/23 demonstrated the same findings without any active GI bleed.          Past Medical History:   Diagnosis Date    Amputation stump pain 9/10/2013    Aspiration pneumonia 7/27/2015    Asterixis 11/8/2016    C. difficile colitis 8/7/2015    Cholelithiasis without obstruction 8/25/2015    Chronic diastolic heart failure     2-23-17   1 - Low normal to mildly depressed left ventricular systolic function (EF 50-55%).    2 - Right ventricular  enlargement with mildly depressed systolic function.    3 - Left ventricular diastolic dysfunction.    4 - Right atrial enlargement.    5 - Severe tricuspid regurgitation.    6 - Pulmonary hypertension. The estimated PA systolic pressure is 86 mmHg.    7 - Increased central venous pressure.    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    Metabolic acidosis, IAG, reduced excretion of inorganic acids     Myoclonic jerking 9/20/2016    Noncompliance with medication regimen 12/4/2018    Secondary hyperparathyroidism (of renal origin)     Secondary pulmonary hypertension 3/23/2017    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    TB lung, latent 08/25/2015    Negative Quantiferon Gold 3-23-17       Past Surgical History:   Procedure Laterality Date    COLONOSCOPY      COLONOSCOPY N/A 4/4/2017    Procedure: COLONOSCOPY;  Surgeon: Walker Stern MD;  Location: [facility] ([address] [place] [address]);  Service: Endoscopy;  Laterality: N/A;  PA Systolic Pressure 85.56. HD Patient MWF, K+ lab prior to procedure.     ESOPHAGOGASTRODUODENOSCOPY N/A 6/12/2018    Procedure: EGD (ESOPHAGOGASTRODUODENOSCOPY);  Surgeon: Man Galicia MD;  Location: [facility] ([address] [place] [address]);  Service: Endoscopy;  Laterality: N/A;  EGD in 8-12 weeks with Dr. Galicia on 4th floor for follow up erosive esophagitis and Mejia's surveillance.    Patient should be on Pantoprazole 40mg every 12 hours or the equivulant of another  PPI    awaiting for patient to reply back regarding changing    ESOPHAGOGASTRODUODENOSCOPY N/A 3/7/2019    Procedure: EGD (ESOPHAGOGASTRODUODENOSCOPY);  Surgeon: Man Galicia MD;  Location: [facility] (2ND FLR);  Service: Endoscopy;  Laterality: N/A;    ESOPHAGOGASTRODUODENOSCOPY N/A 9/23/2019    Procedure: EGD (ESOPHAGOGASTRODUODENOSCOPY);  Surgeon: Keanu Rainey MD;  Location: [facility] ENDO (2ND FLR);  Service: Endoscopy;  Laterality: N/A;    ESOPHAGOGASTRODUODENOSCOPY N/A 10/2/2019    Procedure: EGD (ESOPHAGOGASTRODUODENOSCOPY);  Surgeon: Kevin De La Paz MD;  Location: [facility] (2ND FLR);  Service: Endoscopy;  Laterality: N/A;    FOOT AMPUTATION THROUGH METATARSAL      left foot    LEG AMPUTATION THROUGH KNEE  12/18/2013    left BKA    R AVF  9/12/12    UPPER GASTROINTESTINAL ENDOSCOPY         Review of patient's allergies indicates:   Allergen Reactions    Fosrenol [lanthanum] Nausea And Vomiting     Nausea and vomiting       No current facility-administered medications on file prior to encounter.      Current Outpatient Medications on File Prior to Encounter   Medication Sig    acetaminophen (TYLENOL) 325 MG tablet Take 2 tablets (650 mg total) by mouth every 8 (eight) hours as needed.    carvedilol (COREG) 3.125 MG tablet Take 1 tablet (3.125 mg total) by mouth 2 (two) times daily with meals.    metoclopramide HCl (REGLAN) 10 MG tablet Take 1 tablet (10 mg total) by mouth 3 (three) times daily before meals.    midodrine (PROAMATINE) 5 MG Tab Please take 30 min before dialysis on Monday Wednesday and Friday    ondansetron (ZOFRAN) 8 MG tablet Take 1 tablet (8 mg total) by mouth every 12 (twelve) hours as needed for Nausea.    pantoprazole (PROTONIX) 40 MG tablet Take 1 tablet (40 mg total) by mouth 2 (two) times daily.    RENAPLEX-D 800 mcg-12.5 mg -2,000 unit Tab Take 1 tablet by mouth once daily.    sevelamer carbonate (RENVELA) 800 mg Tab Take 1 tablet (800 mg total) by mouth 3 (three)  times daily with meals.    sucralfate (CARAFATE) 100 mg/mL suspension Take 10 mLs (1 g total) by mouth 4 (four) times daily before meals and nightly.     Family History     Problem Relation (Age of Onset)    Alcohol abuse Maternal Grandmother    Diabetes Brother, Maternal Grandfather    Early death Mother    Heart disease Father    Hyperlipidemia Father    Hypertension Father, Sister    Kidney disease Father        Tobacco Use    Smoking status: Former Smoker     Packs/day: 1.00     Years: 10.00     Pack years: 10.00    Smokeless tobacco: Never Used   Substance and Sexual Activity    Alcohol use: No    Drug use: No    Sexual activity: Yes     Partners: Female     Birth control/protection: None     Review of Systems   Unable to perform ROS: Other   Constitutional: Negative for chills and fever.   Eyes: Negative for redness and visual disturbance.   Respiratory: Negative for cough and shortness of breath.    Cardiovascular: Negative for chest pain and leg swelling.   Gastrointestinal: Positive for abdominal pain and vomiting.   Genitourinary: Negative for dysuria and hematuria.   Skin: Negative for color change and pallor.     Objective:     Vital Signs (Most Recent):  Temp: 98.1 °F (36.7 °C) (11/08/19 2242)  Pulse: 82 (11/08/19 2254)  Resp: 18 (11/08/19 2242)  BP: (!) 168/94 (11/08/19 2242)  SpO2: 99 % (11/08/19 2242) Vital Signs (24h Range):  Temp:  [97.9 °F (36.6 °C)-98.1 °F (36.7 °C)] 98.1 °F (36.7 °C)  Pulse:  [81-94] 82  Resp:  [18] 18  SpO2:  [96 %-100 %] 99 %  BP: (135-199)/() 168/94     Weight: 57 kg (125 lb 10.6 oz)  Body mass index is 20.28 kg/m².    Physical Exam   Constitutional: He appears well-developed. No distress.   HENT:   Head: Normocephalic and atraumatic.   Eyes: Conjunctivae and EOM are normal. No scleral icterus.   Neck: Normal range of motion. Neck supple.   Cardiovascular: Normal rate, regular rhythm, normal heart sounds and intact distal pulses.   Pulmonary/Chest: Effort normal  and breath sounds normal. No respiratory distress. He has no wheezes. He has no rales.   Abdominal: Soft. He exhibits no distension. There is no tenderness. There is no rebound and no guarding.   Musculoskeletal: Normal range of motion. He exhibits no edema.   Neurological: He is alert.   Patient would not answer questions, only responding with occasional grunts. Could not asses orientation   Skin: Skin is warm and dry. He is not diaphoretic.   Psychiatric: He has a normal mood and affect. His behavior is normal. Judgment and thought content normal.         CRANIAL NERVES     CN III, IV, VI   Extraocular motions are normal.        Significant Labs:   Recent Results (from the past 24 hour(s))   CBC auto differential    Collection Time: 11/08/19  3:15 PM   Result Value Ref Range    WBC 8.94 3.90 - 12.70 K/uL    RBC 4.47 (L) 4.60 - 6.20 M/uL    Hemoglobin 13.4 (L) 14.0 - 18.0 g/dL    Hematocrit 44.1 40.0 - 54.0 %    Mean Corpuscular Volume 99 (H) 82 - 98 fL    Mean Corpuscular Hemoglobin 30.0 27.0 - 31.0 pg    Mean Corpuscular Hemoglobin Conc 30.4 (L) 32.0 - 36.0 g/dL    RDW 19.8 (H) 11.5 - 14.5 %    Platelets 163 150 - 350 K/uL    MPV 12.6 9.2 - 12.9 fL    Immature Granulocytes 0.3 0.0 - 0.5 %    Gran # (ANC) 6.7 1.8 - 7.7 K/uL    Immature Grans (Abs) 0.03 0.00 - 0.04 K/uL    Lymph # 1.0 1.0 - 4.8 K/uL    Mono # 0.8 0.3 - 1.0 K/uL    Eos # 0.3 0.0 - 0.5 K/uL    Baso # 0.04 0.00 - 0.20 K/uL    nRBC 0 0 /100 WBC    Gran% 75.0 (H) 38.0 - 73.0 %    Lymph% 11.3 (L) 18.0 - 48.0 %    Mono% 9.2 4.0 - 15.0 %    Eosinophil% 3.8 0.0 - 8.0 %    Basophil% 0.4 0.0 - 1.9 %    Differential Method Automated    ISTAT PROCEDURE    Collection Time: 11/08/19  3:46 PM   Result Value Ref Range    POC Glucose 119 (H) 70 - 110 mg/dL    POC BUN 19 6 - 30 mg/dL    POC Creatinine 4.3 (H) 0.5 - 1.4 mg/dL    POC Sodium 145 136 - 145 mmol/L    POC Potassium 4.3 3.5 - 5.1 mmol/L    POC Chloride 99 95 - 110 mmol/L    POC TCO2 (MEASURED) 37 (H) 23 -  29 mmol/L    POC Ionized Calcium 1.06 1.06 - 1.42 mmol/L    POC Hematocrit 37 36 - 54 %PCV    Sample EVELIA    Comprehensive metabolic panel    Collection Time: 11/08/19  4:22 PM   Result Value Ref Range    Sodium 144 136 - 145 mmol/L    Potassium 4.3 3.5 - 5.1 mmol/L    Chloride 103 95 - 110 mmol/L    CO2 28 23 - 29 mmol/L    Glucose 108 70 - 110 mg/dL    BUN, Bld 16 6 - 20 mg/dL    Creatinine 4.0 (H) 0.5 - 1.4 mg/dL    Calcium 8.4 (L) 8.7 - 10.5 mg/dL    Total Protein 8.2 6.0 - 8.4 g/dL    Albumin 2.9 (L) 3.5 - 5.2 g/dL    Total Bilirubin 0.4 0.1 - 1.0 mg/dL    Alkaline Phosphatase 257 (H) 55 - 135 U/L    AST 28 10 - 40 U/L    ALT 17 10 - 44 U/L    Anion Gap 13 8 - 16 mmol/L    eGFR if African American 18.2 (A) >60 mL/min/1.73 m^2    eGFR if non  15.8 (A) >60 mL/min/1.73 m^2   Troponin I    Collection Time: 11/08/19  4:22 PM   Result Value Ref Range    Troponin I 0.115 (H) 0.000 - 0.026 ng/mL   Type & Screen    Collection Time: 11/08/19  5:30 PM   Result Value Ref Range    Group & Rh A POS     Indirect Marci NEG          Significant Imaging: I have reviewed all pertinent imaging results/findings within the past 24 hours.    Assessment/Plan:     Upper GI bleed  See chronic GI bleed      Erosive gastritis  See chronic upper GI bleeding      Chronic upper GI bleeding  Patient with numerous recent hospital visits for hematemesis and associated abdominal pain presented with same complaint and with 400cc coffee ground emesis. He is noncompliant with history taking and examination in ED and on floor.     Hb 13.4 (bl 7-9). Cr 4 (bl 4-5). Trop 0.115 and hypertensive on admission.  CXR and CThead with no acute findings. Patient received protonix 80 IV, carvedilol 3.125, and metoclopramide IV 10 in ED.    Last EGD (9/2019) revealed a 3cm hiatal hernia with LA Grade D esophagitis. EGD from 9/23 demonstrated the same findings without any active GI bleed. Numerous others in chart review with no significant  changes.    Home meds: metoclopramide 10 TID, protonix 40 po bid, sucralfate 10mls (1g total) qid    Plan:  - q6h CBC; trend hb  - GI consult, appreciate recs  - protonix 40 IV bid  - NPO  - hold sucralfate     ESRD on hemodialysis  Patient with ESRD with MWF HD, unsure of last dialysis as patient is not answering questions  Unlikely to have gotten Friday dialysis as he was in ED; may need Saturday dialysis    Currently hypertensive on admission    Plan:  - Consult nephro; appreciate recs        VTE Risk Mitigation (From admission, onward)         Ordered     Place sequential compression device  Until discontinued      11/09/19 0219     IP VTE HIGH RISK PATIENT  Once      11/09/19 0219                   Fabio Carrington MD  Department of Hospital Medicine   [facility]

## 2019-11-09 NOTE — ED NOTES
Telemetry Verification   Patient placed on Telemetry Box  Verified with War Room  Box # [id number]   Monitor Tech maame   Rate 85   Rhythm NSR

## 2019-11-09 NOTE — PROVIDER PROGRESS NOTES - EMERGENCY DEPT.
Encounter Date: 11/8/2019    ED Physician Progress Notes        Physician Note:   CT delayed secondary to patient being on airborne precautions.  Chest x-ray is clear patient is vomiting and not coughing in the emergency department.  This not consistent with acute TB, therefore precautions discontinued.

## 2019-11-09 NOTE — PROGRESS NOTES
Patient received in dialysis unit via stretcher. Maintenance( Off day, MWF)dialysis began per orders via 15 gauge fistula needles to right upper arm fistula.

## 2019-11-09 NOTE — ASSESSMENT & PLAN NOTE
The patient is 53 yo AA male admitted with complaints of coffee-grind emesis. Hgb stable at 13.4 on admit, now 10.4. Admitted for a GI evaluation.      RICHARD AVF  C-Deckbar   3 hrs 45 min   57 kg  Dr. Lemus     Plan:   - Will provide dialysis today for metabolic clearance and volume management per outpatient schedule, target UF 1-2 L as tolerated, keep MAP >65.  - Seen on HD this morning. Tolerating treatment well.  - Dialysate adjusted to current labs  - Patient hypertensive. HD will help with volume removal and HTN management  - Will obtain outpatient HD records   - Recommend renal diet when appropriate   - Hgb 10.4, will hold JONAS therapy   - Continue to monitor intake and output, daily weights   - Avoid nephrotoxic medication and renal dose medications to GFR  - Will discuss with staff

## 2019-11-09 NOTE — SUBJECTIVE & OBJECTIVE
Past Medical History:   Diagnosis Date    Amputation stump pain 9/10/2013    Aspiration pneumonia 7/27/2015    Asterixis 11/8/2016    C. difficile colitis 8/7/2015    Cholelithiasis without obstruction 8/25/2015    Chronic diastolic heart failure     2-23-17   1 - Low normal to mildly depressed left ventricular systolic function (EF 50-55%).    2 - Right ventricular enlargement with mildly depressed systolic function.    3 - Left ventricular diastolic dysfunction.    4 - Right atrial enlargement.    5 - Severe tricuspid regurgitation.    6 - Pulmonary hypertension. The estimated PA systolic pressure is 86 mmHg.    7 - Increased central venous pressure.    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    Metabolic acidosis, IAG, reduced excretion of inorganic acids     Myoclonic jerking 9/20/2016    Noncompliance with medication regimen 12/4/2018    Secondary hyperparathyroidism (of renal origin)     Secondary pulmonary hypertension 3/23/2017    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    TB lung, latent 08/25/2015    Negative Quantiferon Gold 3-23-17       Past Surgical History:   Procedure Laterality Date    COLONOSCOPY      COLONOSCOPY N/A 4/4/2017    Procedure: COLONOSCOPY;  Surgeon: Walker Stern MD;  Location: [facility] ([address] [place] [address]);  Service: Endoscopy;  Laterality: N/A;  PA Systolic Pressure 85.56. HD Patient MWF, K+ lab prior to procedure.      ESOPHAGOGASTRODUODENOSCOPY N/A 6/12/2018    Procedure: EGD (ESOPHAGOGASTRODUODENOSCOPY);  Surgeon: Man Galicia MD;  Location: [facility] (2ND FLR);  Service: Endoscopy;  Laterality: N/A;  EGD in 8-12 weeks with Dr. Galicia on 4th floor for follow up erosive esophagitis and Mejia's surveillance.    Patient should be on Pantoprazole 40mg every 12 hours or the equivulant of another PPI    awaiting for patient to reply back regarding changing    ESOPHAGOGASTRODUODENOSCOPY N/A 3/7/2019    Procedure: EGD (ESOPHAGOGASTRODUODENOSCOPY);  Surgeon: Man Galicia MD;  Location: [facility] (2ND FLR);  Service: Endoscopy;  Laterality: N/A;    ESOPHAGOGASTRODUODENOSCOPY N/A 9/23/2019    Procedure: EGD (ESOPHAGOGASTRODUODENOSCOPY);  Surgeon: Keanu Rainey MD;  Location: [facility] (2ND FLR);  Service: Endoscopy;  Laterality: N/A;    ESOPHAGOGASTRODUODENOSCOPY N/A 10/2/2019    Procedure: EGD (ESOPHAGOGASTRODUODENOSCOPY);  Surgeon: Kevin De La Paz MD;  Location: [facility] (2ND FLR);  Service: Endoscopy;  Laterality: N/A;    FOOT AMPUTATION THROUGH METATARSAL      left foot    LEG AMPUTATION THROUGH KNEE  12/18/2013    left BKA    R AVF  9/12/12    UPPER GASTROINTESTINAL ENDOSCOPY         Review of patient's allergies indicates:   Allergen Reactions    Fosrenol [lanthanum] Nausea And Vomiting     Nausea and vomiting       No current facility-administered medications on file prior to encounter.      Current Outpatient Medications on File Prior to Encounter   Medication Sig    acetaminophen (TYLENOL) 325 MG tablet Take 2 tablets (650 mg total) by mouth every 8 (eight) hours as needed.    carvedilol (COREG) 3.125 MG tablet Take 1 tablet (3.125 mg total) by mouth 2 (two) times daily with meals.    metoclopramide HCl (REGLAN) 10 MG tablet Take 1 tablet (10 mg total) by mouth 3 (three) times daily before meals.    midodrine (PROAMATINE) 5 MG Tab Please take 30 min before dialysis on Monday Wednesday and Friday     ondansetron (ZOFRAN) 8 MG tablet Take 1 tablet (8 mg total) by mouth every 12 (twelve) hours as needed for Nausea.    pantoprazole (PROTONIX) 40 MG tablet Take 1 tablet (40 mg total) by mouth 2 (two) times daily.    RENAPLEX-D 800 mcg-12.5 mg -2,000 unit Tab Take 1 tablet by mouth once daily.    sevelamer carbonate (RENVELA) 800 mg Tab Take 1 tablet (800 mg total) by mouth 3 (three) times daily with meals.    sucralfate (CARAFATE) 100 mg/mL suspension Take 10 mLs (1 g total) by mouth 4 (four) times daily before meals and nightly.     Family History     Problem Relation (Age of Onset)    Alcohol abuse Maternal Grandmother    Diabetes Brother, Maternal Grandfather    Early death Mother    Heart disease Father    Hyperlipidemia Father    Hypertension Father, Sister    Kidney disease Father        Tobacco Use    Smoking status: Former Smoker     Packs/day: 1.00     Years: 10.00     Pack years: 10.00    Smokeless tobacco: Never Used   Substance and Sexual Activity    Alcohol use: No    Drug use: No    Sexual activity: Yes     Partners: Female     Birth control/protection: None     Review of Systems   Unable to perform ROS: Other   Constitutional: Negative for chills and fever.   Eyes: Negative for redness and visual disturbance.   Respiratory: Negative for cough and shortness of breath.    Cardiovascular: Negative for chest pain and leg swelling.   Gastrointestinal: Positive for abdominal pain and vomiting.   Genitourinary: Negative for dysuria and hematuria.   Skin: Negative for color change and pallor.     Objective:     Vital Signs (Most Recent):  Temp: 98.1 °F (36.7 °C) (11/08/19 2242)  Pulse: 82 (11/08/19 2254)  Resp: 18 (11/08/19 2242)  BP: (!) 168/94 (11/08/19 2242)  SpO2: 99 % (11/08/19 2242) Vital Signs (24h Range):  Temp:  [97.9 °F (36.6 °C)-98.1 °F (36.7 °C)] 98.1 °F (36.7 °C)  Pulse:  [81-94] 82  Resp:  [18] 18  SpO2:  [96 %-100 %] 99 %  BP: (135-199)/() 168/94     Weight: 57 kg (125 lb 10.6  oz)  Body mass index is 20.28 kg/m².    Physical Exam   Constitutional: He appears well-developed. No distress.   HENT:   Head: Normocephalic and atraumatic.   Eyes: Conjunctivae and EOM are normal. No scleral icterus.   Neck: Normal range of motion. Neck supple.   Cardiovascular: Normal rate, regular rhythm, normal heart sounds and intact distal pulses.   Pulmonary/Chest: Effort normal and breath sounds normal. No respiratory distress. He has no wheezes. He has no rales.   Abdominal: Soft. He exhibits no distension. There is no tenderness. There is no rebound and no guarding.   Musculoskeletal: Normal range of motion. He exhibits no edema.   Neurological: He is alert.   Patient would not answer questions, only responding with occasional grunts. Could not asses orientation   Skin: Skin is warm and dry. He is not diaphoretic.   Psychiatric: He has a normal mood and affect. His behavior is normal. Judgment and thought content normal.         CRANIAL NERVES     CN III, IV, VI   Extraocular motions are normal.        Significant Labs:   Recent Results (from the past 24 hour(s))   CBC auto differential    Collection Time: 11/08/19  3:15 PM   Result Value Ref Range    WBC 8.94 3.90 - 12.70 K/uL    RBC 4.47 (L) 4.60 - 6.20 M/uL    Hemoglobin 13.4 (L) 14.0 - 18.0 g/dL    Hematocrit 44.1 40.0 - 54.0 %    Mean Corpuscular Volume 99 (H) 82 - 98 fL    Mean Corpuscular Hemoglobin 30.0 27.0 - 31.0 pg    Mean Corpuscular Hemoglobin Conc 30.4 (L) 32.0 - 36.0 g/dL    RDW 19.8 (H) 11.5 - 14.5 %    Platelets 163 150 - 350 K/uL    MPV 12.6 9.2 - 12.9 fL    Immature Granulocytes 0.3 0.0 - 0.5 %    Gran # (ANC) 6.7 1.8 - 7.7 K/uL    Immature Grans (Abs) 0.03 0.00 - 0.04 K/uL    Lymph # 1.0 1.0 - 4.8 K/uL    Mono # 0.8 0.3 - 1.0 K/uL    Eos # 0.3 0.0 - 0.5 K/uL    Baso # 0.04 0.00 - 0.20 K/uL    nRBC 0 0 /100 WBC    Gran% 75.0 (H) 38.0 - 73.0 %    Lymph% 11.3 (L) 18.0 - 48.0 %    Mono% 9.2 4.0 - 15.0 %    Eosinophil% 3.8 0.0 - 8.0 %     Basophil% 0.4 0.0 - 1.9 %    Differential Method Automated    ISTAT PROCEDURE    Collection Time: 11/08/19  3:46 PM   Result Value Ref Range    POC Glucose 119 (H) 70 - 110 mg/dL    POC BUN 19 6 - 30 mg/dL    POC Creatinine 4.3 (H) 0.5 - 1.4 mg/dL    POC Sodium 145 136 - 145 mmol/L    POC Potassium 4.3 3.5 - 5.1 mmol/L    POC Chloride 99 95 - 110 mmol/L    POC TCO2 (MEASURED) 37 (H) 23 - 29 mmol/L    POC Ionized Calcium 1.06 1.06 - 1.42 mmol/L    POC Hematocrit 37 36 - 54 %PCV    Sample EVELIA    Comprehensive metabolic panel    Collection Time: 11/08/19  4:22 PM   Result Value Ref Range    Sodium 144 136 - 145 mmol/L    Potassium 4.3 3.5 - 5.1 mmol/L    Chloride 103 95 - 110 mmol/L    CO2 28 23 - 29 mmol/L    Glucose 108 70 - 110 mg/dL    BUN, Bld 16 6 - 20 mg/dL    Creatinine 4.0 (H) 0.5 - 1.4 mg/dL    Calcium 8.4 (L) 8.7 - 10.5 mg/dL    Total Protein 8.2 6.0 - 8.4 g/dL    Albumin 2.9 (L) 3.5 - 5.2 g/dL    Total Bilirubin 0.4 0.1 - 1.0 mg/dL    Alkaline Phosphatase 257 (H) 55 - 135 U/L    AST 28 10 - 40 U/L    ALT 17 10 - 44 U/L    Anion Gap 13 8 - 16 mmol/L    eGFR if African American 18.2 (A) >60 mL/min/1.73 m^2    eGFR if non  15.8 (A) >60 mL/min/1.73 m^2   Troponin I    Collection Time: 11/08/19  4:22 PM   Result Value Ref Range    Troponin I 0.115 (H) 0.000 - 0.026 ng/mL   Type & Screen    Collection Time: 11/08/19  5:30 PM   Result Value Ref Range    Group & Rh A POS     Indirect Marci NEG          Significant Imaging: I have reviewed all pertinent imaging results/findings within the past 24 hours.

## 2019-11-09 NOTE — NURSING
Pt had two episodes of vomiting this shift. Light brown watery and slight mucus present. Both episodes consisted of approx 300 cc. Does not appear to have coffee grind consistently and is not dark in color. Pt declined zofran for nausea after much encouragement.  Pt denies SOB or CP. He has also refused sceduled labs this shift. MD notified.

## 2019-11-09 NOTE — HPI
Mr. Retana is a 53 yo M with ESRD (MWF dialysis), L BKA 2/2 osteo, CHF (EF 60% 2017), multiple ICH without residual deficits, gastroparesis, esophagitis, and numerous episodes of hematemesis in the past months who presented to the ED from [facility] with c/o hematemesis. Patient did no cooperate with interview for ED or medicine physicians as well as with nursing staff. He will occasionally nod head or grunt in response and will only occasionally follow commands, however patient was noted to be complaining of pain in ED and has an emesis bag with appx 400cc of ground coffee ground emesis.     Of note, patient's most recent admission for the same problem was on 9/28 with numerous other visits before that date. Patient was transfused multiple units of blood on that admission and was discharged following treatment with protonix and sucralfate and after an EGD revealed a 3cm hiatal hernia with LA Grade D esophagitis. Of note, EGD from 9/23 demonstrated the same findings without any active GI bleed.

## 2019-11-09 NOTE — PT/OT/SLP PROGRESS
Physical Therapy      Patient Name:  Vaughn Retana   MRN:  [id number]    Patient not seen today secondary to Dialysis. Pt off floor for dialysis in AM. PT unable to return in PM. Will follow-up at next scheduled session as able.    Siobhan Deshpande, PT, DPT   11/9/2019  [phone]

## 2019-11-09 NOTE — ASSESSMENT & PLAN NOTE
Patient with numerous recent hospital visits for hematemesis and associated abdominal pain presented with same complaint and with 400cc coffee ground emesis. He is noncompliant with history taking and examination in ED and on floor.     Hb 13.4 (bl 7-9). Cr 4 (bl 4-5). Trop 0.115 and hypertensive on admission.  CXR and CThead with no acute findings. Patient received protonix 80 IV, carvedilol 3.125, and metoclopramide IV 10 in ED.    Last EGD (9/2019) revealed a 3cm hiatal hernia with LA Grade D esophagitis. EGD from 9/23 demonstrated the same findings without any active GI bleed. Numerous others in chart review with no significant changes.    Home meds: metoclopramide 10 TID, protonix 40 po bid, sucralfate 10mls (1g total) qid    Plan:  - q6h CBC; trend hb  - GI consult, appreciate recs  - protonix 40 IV bid  - NPO  - hold sucralfate

## 2019-11-09 NOTE — NURSING
"Received pt to floor via stretcher. Pt placed in bed heart monitor on. Pt very sleepy and non-cooperative with admission questions and exams. Pt stated, "I just want to go to sleep". Focused assessment performed. No direct needs voiced. Nadn; will monitor. See full assessment.  "

## 2019-11-09 NOTE — ASSESSMENT & PLAN NOTE
Patient with ESRD with MWF HD, unsure of last dialysis as patient is not answering questions  Unlikely to have gotten Friday dialysis as he was in ED; may need Saturday dialysis    Currently hypertensive on admission    Plan:  - Consult nephro; appreciate recs

## 2019-11-09 NOTE — ANESTHESIA PREPROCEDURE EVALUATION
[facility]  Anesthesia Pre-Operative Evaluation         Patient Name: Vaughn Retana  YOB: 1965  MRN: [id number]    SUBJECTIVE:     Pre-operative evaluation for Procedure(s) (LRB):  EGD (ESOPHAGOGASTRODUODENOSCOPY) (N/A)  COLONOSCOPY (N/A)     11/09/2019    Vaughn Retana is a 54 y.o. 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 Admitted for coffee ground hematemesis, HDS.    Patient now presents for the above procedure(s).      LDA:        Peripheral IV - Single Lumen 11/08/19 1513 20 G Left Hand (Active)   Site Assessment Clean;Dry;Intact;No redness;No swelling 11/9/2019  3:00 PM   Line Status Saline locked 11/9/2019  3:00 PM   Dressing Status Biopatch in place 11/9/2019  3:00 PM   Dressing Intervention New dressing 11/9/2019  3:00 PM   Dressing Change Due 11/12/19 11/9/2019  3:00 PM   Site Change Due 11/12/19 11/9/2019  3:00 PM   Reason Not Rotated Not due 11/9/2019  3:00 PM   Number of days: 1            Hemodialysis AV Fistula Right upper arm (Active)   Needle Size 15ga 11/9/2019 12:30 PM   Site Assessment Clean;Dry;Intact 11/9/2019 12:30 PM   Patency Present;Thrill;Bruit 11/9/2019 12:30 PM   Status Deaccessed 11/9/2019 12:30 PM   Flows Good 11/9/2019  9:10 AM   Dressing Intervention New dressing 11/9/2019 12:30 PM   Dressing Status Clean;Dry;Intact 11/9/2019 12:30 PM   Site Condition No complications 11/9/2019 12:30 PM   Dressing Pressure dressing 11/9/2019 12:30 PM   Number of days:             Hemodialysis AV Fistula Right upper arm (Active)   Number of days:        Prev airway: None documented.    Drips: None.      Patient Active Problem List   Diagnosis    Anemia in chronic kidney disease    Secondary hyperparathyroidism of renal origin    Dyslipidemia    ESRD on hemodialysis    Peripheral vascular disease in diabetes mellitus    History of left  below knee amputation 12/18/13    History of Intracerebral Hemorrhage: L BG 5/2013; R BG 9/2016; R BG 11/2016; L caudate head 2/2017    Secondary pulmonary hypertension    Chronic kidney disease-mineral and bone disorder    Renovascular hypertension    Chronic upper GI bleeding    Normocytic anemia    History of GI bleed    Erosive gastritis    Noncompliance    Severe malnutrition    History of TB (tuberculosis)    Hemodialysis-associated hypotension    Hematemesis with nausea    Anemia    GERD with esophagitis    Gastrointestinal hemorrhage with hematemesis    Chronic blood loss anemia    Hepatitis C    Gastroparesis    Emesis, persistent    Upper GI bleed       Review of patient's allergies indicates:   Allergen Reactions    Fosrenol [lanthanum] Nausea And Vomiting     Nausea and vomiting       Current Inpatient Medications:   [START ON 11/11/2019] sodium chloride 0.9%   Intravenous Once    carvedilol  3.125 mg Oral BID    [START ON 11/10/2019] pantoprazole  40 mg Intravenous BID    [START ON 11/10/2019] polyethylene glycol  4,000 mL Oral Once    senna-docusate 8.6-50 mg  1 tablet Oral BID       No current facility-administered medications on file prior to encounter.      Current Outpatient Medications on File Prior to Encounter   Medication Sig Dispense Refill    acetaminophen (TYLENOL) 325 MG tablet Take 2 tablets (650 mg total) by mouth every 8 (eight) hours as needed.  0    carvedilol (COREG) 3.125 MG tablet Take 1 tablet (3.125 mg total) by mouth 2 (two) times daily with meals. 60 tablet 11    metoclopramide HCl (REGLAN) 10 MG tablet Take 1 tablet (10 mg total) by mouth 3 (three) times daily before meals. 90 tablet 1    midodrine (PROAMATINE) 5 MG Tab Please take 30 min before dialysis on Monday Wednesday and Friday 60 tablet 3    ondansetron (ZOFRAN) 8 MG tablet Take 1 tablet (8 mg total) by mouth every 12 (twelve) hours as needed for Nausea. 60 tablet 1    pantoprazole  (PROTONIX) 40 MG tablet Take 1 tablet (40 mg total) by mouth 2 (two) times daily. 60 tablet 6    RENAPLEX-D 800 mcg-12.5 mg -2,000 unit Tab Take 1 tablet by mouth once daily.  3    sevelamer carbonate (RENVELA) 800 mg Tab Take 1 tablet (800 mg total) by mouth 3 (three) times daily with meals. 90 tablet 3    sucralfate (CARAFATE) 100 mg/mL suspension Take 10 mLs (1 g total) by mouth 4 (four) times daily before meals and nightly. 420 mL 2       Past Surgical History:   Procedure Laterality Date    COLONOSCOPY      COLONOSCOPY N/A 4/4/2017    Procedure: COLONOSCOPY;  Surgeon: Walker Stern MD;  Location: [facility] (2ND FLR);  Service: Endoscopy;  Laterality: N/A;  PA Systolic Pressure 85.56. HD Patient MWF, K+ lab prior to procedure.     ESOPHAGOGASTRODUODENOSCOPY N/A 6/12/2018    Procedure: EGD (ESOPHAGOGASTRODUODENOSCOPY);  Surgeon: Man Galicia MD;  Location: [facility] ([address] [place] [address]);  Service: Endoscopy;  Laterality: N/A;  EGD in 8-12 weeks with Dr. Galicia on 4th floor for follow up erosive esophagitis and Mejia's surveillance.    Patient should be on Pantoprazole 40mg every 12 hours or the equivulant of another PPI    awaiting for patient to reply back regarding changing    ESOPHAGOGASTRODUODENOSCOPY N/A 3/7/2019    Procedure: EGD (ESOPHAGOGASTRODUODENOSCOPY);  Surgeon: Man Galicia MD;  Location: [facility] ([address] [place] [address]);  Service: Endoscopy;  Laterality: N/A;    ESOPHAGOGASTRODUODENOSCOPY N/A 9/23/2019    Procedure: EGD (ESOPHAGOGASTRODUODENOSCOPY);  Surgeon: Keanu Rainey MD;  [facility]  Service: Endoscopy;  Laterality: N/A;    ESOPHAGOGASTRODUODENOSCOPY N/A 10/2/2019    Procedure: EGD (ESOPHAGOGASTRODUODENOSCOPY);  Surgeon: Kevin De La Paz MD;  [facility]  Service: Endoscopy;  Laterality: N/A;    FOOT AMPUTATION THROUGH METATARSAL      left foot    LEG AMPUTATION THROUGH KNEE  12/18/2013    left BKA    R AVF  9/12/12    UPPER GASTROINTESTINAL ENDOSCOPY          Social History     Socioeconomic History    Marital status:      Spouse name: Not on file    Number of children: Not on file    Years of education: Not on file    Highest education level: Not on file   Occupational History    Not on file   Social Needs    Financial resource strain: Not on file    Food insecurity:     Worry: Not on file     Inability: Not on file    Transportation needs:     Medical: Not on file     Non-medical: Not on file   Tobacco Use    Smoking status: Former Smoker     Packs/day: 1.00     Years: 10.00     Pack years: 10.00    Smokeless tobacco: Never Used   Substance and Sexual Activity    Alcohol use: No    Drug use: No    Sexual activity: Yes     Partners: Female     Birth control/protection: None   Lifestyle    Physical activity:     Days per week: Not on file     Minutes per session: Not on file    Stress: Not on file   Relationships    Social connections:     Talks on phone: Not on file     Gets together: Not on file     Attends Voodoo service: Not on file     Active member of club or organization: Not on file     Attends meetings of clubs or organizations: Not on file     Relationship status: Not on file   Other Topics Concern    Not on file   Social History Narrative    Not on file       OBJECTIVE:     Vital Signs Range (Last 24H):  Temp:  [36.4 °C (97.6 °F)-37.1 °C (98.8 °F)]   Pulse:  [75-94]   Resp:  [18]   BP: ()/()   SpO2:  [96 %-100 %]       Significant Labs:  Lab Results   Component Value Date    WBC 6.82 11/09/2019    HGB 10.4 (L) 11/09/2019    HCT 34.3 (L) 11/09/2019     11/09/2019    CHOL 111 (L) 06/22/2019    TRIG 59 06/22/2019    HDL 40 06/22/2019    ALT 14 11/09/2019    AST 22 11/09/2019     11/09/2019    K 4.5 11/09/2019     11/09/2019    CREATININE 5.7 (H) 11/09/2019    BUN 25 (H) 11/09/2019    CO2 27 11/09/2019    TSH 0.699 11/09/2019    PSA 1.7 10/03/2018    INR 1.0 10/01/2019    HGBA1C 4.0 11/09/2019        EKG:   Normal sinus rhythm  Incomplete right bundle branch block  Nonspecific T wave abnormality  Abnormal ECG    2D ECHO: 7/2017  CONCLUSIONS     1 - Normal left ventricular systolic function (EF 60-65%).     2 - Concentric hypertrophy.     3 - No wall motion abnormalities.     4 - Normal left ventricular diastolic function.     5 - Normal right ventricular systolic function .       ASSESSMENT/PLAN:       Anesthesia Evaluation    I have reviewed the Patient Summary Reports.     I have reviewed the Medications.     Review of Systems  Anesthesia Hx:  No problems with previous Anesthesia  History of prior surgery of interest to airway management or planning: Denies Family Hx of Anesthesia complications.   Denies Personal Hx of Anesthesia complications.   Social:  No Alcohol Use, Former Smoker    Hematology/Oncology:     Oncology Normal    -- Anemia:   EENT/Dental:EENT/Dental Normal   Cardiovascular:   Exercise tolerance: poor Hypertension, well controlled Denies MI.   Denies CABG/stent.   Denies Angina. CHF Diastolic dysfunction Congestive Heart Failure (CHF) , Chronic Congestive Heart Failure , LV Diastolic HF   Pulmonary:   Denies COPD.  Denies Asthma.    Renal/:   Chronic Renal Disease, ESRD    Hepatic/GI:   PUD, GERD Hepatitis, C hematemesis   Musculoskeletal:  Musculoskeletal Normal    Neurological:   Denies TIA. CVA, no residual symptoms Denies Seizures.    Endocrine:   Denies Diabetes.    Dermatological:  Skin Normal    Psych:  Psychiatric Normal           Physical Exam  General:  Malnutrition    Airway/Jaw/Neck:  Airway Findings: Mouth Opening: Normal Tongue: Normal  General Airway Assessment: Adult  Mallampati: III  TM Distance: Normal, at least 6 cm  Jaw/Neck Findings:     Eyes/Ears/Nose:  EYES/EARS/NOSE FINDINGS: Normal   Dental:  Dental Findings: Periodontal disease, Severe    Chest/Lungs:  Chest/Lungs Findings: Clear to auscultation     Heart/Vascular:  Heart Findings: Rate: Normal  Rhythm:  Regular Rhythm  Heart murmur: negative    Abdomen:  Abdomen Findings:  Soft, Nontender     Musculoskeletal:  L BKA    Mental Status:  Mental Status Findings:  Alert and Oriented, Cooperative         Anesthesia Plan  Type of Anesthesia, risks & benefits discussed:  Anesthesia Type:  general, MAC  Patient's Preference:   Intra-op Monitoring Plan: standard ASA monitors  Intra-op Monitoring Plan Comments:   Post Op Pain Control Plan: multimodal analgesia, IV/PO Opioids PRN and per primary service following discharge from PACU  Post Op Pain Control Plan Comments:   Induction:   IV  Beta Blocker:         Informed Consent: Patient understands risks and agrees with Anesthesia plan.  Questions answered. Anesthesia consent signed with patient.  ASA Score: 3     Day of Surgery Review of History & Physical:            Ready For Surgery From Anesthesia Perspective.

## 2019-11-09 NOTE — NURSING
Rec'd report from ISMAEL Chahal. No questions or concerns noted. Will await pt's arrival to floor.

## 2019-11-10 PROBLEM — Z86.15 PERSONAL HISTORY OF LATENT TUBERCULOSIS INFECTION: Status: ACTIVE | Noted: 2018-12-05

## 2019-11-10 NOTE — PT/OT/SLP PROGRESS
Occupational Therapy      Patient Name:  Vaughn Retana   MRN:  [id number]    Patient not seen today secondary to Patient unwilling to participate despite max encouragement from OT. Pt refused to remove blanket from over head or answer questions. Will follow-up as scheduled.    Manuela Martines OT  11/10/2019

## 2019-11-10 NOTE — ASSESSMENT & PLAN NOTE
-GI consulted, EGD/Colonscopy 11/11  -Transfuse pRBC for Hb < 7 g/dL   -IV PPI 40 BID  -Avoid nonsteroidal agents, antiplatelet agents and anticoagulants if possible in patients without absolute contraindications  -Clear liquid diet; NPO from midnight 11/11, colon prep at bedside. EGD/colonoscopy 11/11

## 2019-11-10 NOTE — PROGRESS NOTES
[facility] Hospital Medicine  Progress Note    Patient Name: Vaughn Retana  MRN: [id number]  Patient Class: IP- Inpatient   Admission Date: 11/8/2019  Length of Stay: 1 days  Attending Physician: Jewels Saldivar MD  Primary Care Provider: [facility] Medicine Team: [facility] 2 Gregory Araiza DO    Subjective:     Principal Problem:Gastrointestinal hemorrhage with hematemesis        HPI:  Mr. 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 Patient did no cooperate with interview for ED or medicine physicians as well as with nursing staff. He will occasionally nod head or grunt in response and will only occasionally follow commands, however patient was noted to be complaining of pain in ED and has an emesis bag with appx 400cc of ground coffee ground emesis.     Of note, patient's most recent admission for the same problem was on 9/28 with numerous other visits before that date. Patient was transfused multiple units of blood on that admission and was discharged following treatment with protonix and sucralfate and after an EGD revealed a 3cm hiatal hernia with LA Grade D esophagitis. Of note, EGD from 9/23 demonstrated the same findings without any active GI bleed.          Overview/Hospital Course:  No notes on file    Interval History: Pt with vomiting overnight; hypoglycemia to 67 this AM, improved with oral glucose. Pt interactive with interviewer this AM, but with terse, short responses. Asking when he can have regular food rather than just liquids.     Review of Systems   Constitutional: Negative for chills and fever.   Eyes: Negative for redness and visual disturbance.   Respiratory: Negative for cough and shortness of breath.     Cardiovascular: Negative for chest pain and leg swelling.   Gastrointestinal: Positive for abdominal pain and vomiting.   Genitourinary: Negative for dysuria and hematuria.   Skin: Negative for color change and pallor.     Objective:     Vital Signs (Most Recent):  Temp: 98.1 °F (36.7 °C) (11/10/19 1144)  Pulse: 78 (11/10/19 1144)  Resp: 14 (11/10/19 1144)  BP: (!) 155/81 (11/10/19 1144)  SpO2: 99 % (11/10/19 1144) Vital Signs (24h Range):  Temp:  [97.6 °F (36.4 °C)-98.5 °F (36.9 °C)] 98.1 °F (36.7 °C)  Pulse:  [70-81] 78  Resp:  [14-20] 14  SpO2:  [98 %-100 %] 99 %  BP: (129-167)/(81-93) 155/81     Weight: 57 kg (125 lb 10.6 oz)  Body mass index is 20.28 kg/m².    Intake/Output Summary (Last 24 hours) at 11/10/2019 1258  Last data filed at 11/10/2019 0600  Gross per 24 hour   Intake --   Output 0 ml   Net 0 ml      Physical Exam   Constitutional: He appears well-developed. No distress.   HENT:   Head: Normocephalic and atraumatic.   Eyes: Conjunctivae and EOM are normal. No scleral icterus.   Neck: Normal range of motion. Neck supple.   Cardiovascular: Normal rate, regular rhythm, normal heart sounds and intact distal pulses.   Pulmonary/Chest: Effort normal and breath sounds normal. No respiratory distress. He has no wheezes. He has no rales.   Abdominal: Soft. He exhibits no distension. There is no tenderness. There is no rebound and no guarding.   Musculoskeletal: Normal range of motion. He exhibits no edema.   Neurological: He is alert.   Skin: Skin is warm and dry. He is not diaphoretic.   Psychiatric: He has a normal mood and affect. Judgment and thought content normal.       Significant Labs:   CBC:   Recent Labs   Lab 11/09/19  0930 11/10/19  0007 11/10/19  0756   WBC 6.82 7.68 7.36   HGB 10.4* 11.6* 11.1*   HCT 34.3* 37.4* 35.5*    152 157     CMP:   Recent Labs   Lab 11/08/19  1622 11/09/19  0930 11/10/19  0536    145 138   K 4.3 4.5 4.3    104 105   CO2 28 27 25    82 67*    BUN 16 25* 17   CREATININE 4.0* 5.7* 4.5*   CALCIUM 8.4* 8.2* 8.4*   PROT 8.2 7.7 7.9   ALBUMIN 2.9* 2.7* 2.8*   BILITOT 0.4 0.5 0.5   ALKPHOS 257* 234* 235*   AST 28 22 21   ALT 17 14 15   ANIONGAP 13 14 8   EGFRNONAA 15.8* 10.3* 13.7*     All pertinent labs within the past 24 hours have been reviewed.    Significant Imaging: I have reviewed all pertinent imaging results/findings within the past 24 hours.      Assessment/Plan:      * Gastrointestinal hemorrhage with hematemesis  -GI consulted, EGD/Colonscopy 11/11  -Transfuse pRBC for Hb < 7 g/dL   -IV PPI 40 BID  -Avoid nonsteroidal agents, antiplatelet agents and anticoagulants if possible in patients without absolute contraindications  -Clear liquid diet; NPO from midnight 11/11, colon prep at bedside. EGD/colonoscopy 11/11          Gastroparesis  -dietary management              -eating small, low-fat, low-fiber meals 4-5 times/day              -copious non carbonated fluid intake              -dietitian consulted  -Zofran PRN. Will consider metoclopramide if no evidence of ongoing GI hemorrhage as contraindicated in GI hemorrhage    Personal history of latent tuberculosis infection  -No respiratory symptoms this admission  -PPD negative one month ago  -CXR without acute findings; no   -prior AFB cultures (including 12/2018) negative.   -Previously referred to Infectious disease to discuss latent TB prophylaxis in 2015, though lost to follow up.   -No current indication for negative isolation or further testing; will consider outpatient referral to ID on discharge.     Erosive gastritis  See chronic upper GI bleeding      Chronic upper GI bleeding  Patient with numerous recent hospital visits for hematemesis and associated abdominal pain presented with same complaint and with 400cc coffee ground emesis. He is noncompliant with history taking and examination in ED and on floor.     Hb 13.4 (bl 7-9). Cr 4 (bl 4-5). Trop 0.115 and hypertensive on admission.  CXR  and CThead with no acute findings. Patient received protonix 80 IV, carvedilol 3.125, and metoclopramide IV 10 in ED.    Last EGD (9/2019) revealed a 3cm hiatal hernia with LA Grade D esophagitis. EGD from 9/23 demonstrated the same findings without any active GI bleed. Numerous others in chart review with no significant changes.    Home meds: metoclopramide 10 TID, protonix 40 po bid, sucralfate 10mls (1g total) qid    Plan:  - q6h CBC; trend hb  - GI consult, appreciate recs  - protonix 40 IV bid  - NPO  - hold sucralfate     ESRD on hemodialysis  Patient with ESRD with MWF HD, unsure of last dialysis as patient is not answering questions  Unlikely to have gotten Friday dialysis as he was in ED; may need Saturday dialysis    Currently hypertensive on admission    Plan:  - Consult nephro; appreciate recs      VTE Risk Mitigation (From admission, onward)         Ordered     Place sequential compression device  Until discontinued      11/09/19 0219     IP VTE HIGH RISK PATIENT  Once      11/09/19 0219                      Gregory Araiza DO  Department of Hospital Medicine   [facility]

## 2019-11-10 NOTE — ASSESSMENT & PLAN NOTE
-No respiratory symptoms this admission  -PPD negative one month ago  -CXR without acute findings; no   -prior AFB cultures (including 12/2018) negative.   -Previously referred to Infectious disease to discuss latent TB prophylaxis in 2015, though lost to follow up.   -No current indication for negative isolation or further testing; will consider outpatient referral to ID on discharge.

## 2019-11-10 NOTE — PLAN OF CARE
"Mr. Mendes arrived at 2300 last night. A quick assessment and admission was done per pt request. He slept the rest of the night without incident. There is a 20 g to left thumb. Fistula intact with dressing to R upper arm. Good thrill and bruit felt. L bka noted. Hs states "latent TB." Airborne Isolation in effect in Negaaative Pressure room. Tele showing SR. Allergy bracelets, Fall precautions and Limb Alert put on. Computer placed in room after activation. Refused some labs this am. Clear liquids today in preparation for EGD and colonoscopy in the am. Will continue to monitor.  "

## 2019-11-10 NOTE — PLAN OF CARE
Pt turns and repositions through shift.  No skin breakdown noted. Pt pain and safety monitored q 1-2 hrs this shift .Bed locked and in lowest position. Rails elevated x 3. Brakes on. Call light and personal belongings in reach. Will continue monitor.

## 2019-11-10 NOTE — SUBJECTIVE & OBJECTIVE
Interval History: Pt with vomiting overnight; hypoglycemia to 67 this AM, improved with oral glucose. Pt interactive with interviewer this AM, but with terse, short responses. Asking when he can have regular food rather than just liquids.     Review of Systems   Constitutional: Negative for chills and fever.   Eyes: Negative for redness and visual disturbance.   Respiratory: Negative for cough and shortness of breath.    Cardiovascular: Negative for chest pain and leg swelling.   Gastrointestinal: Positive for abdominal pain and vomiting.   Genitourinary: Negative for dysuria and hematuria.   Skin: Negative for color change and pallor.     Objective:     Vital Signs (Most Recent):  Temp: 98.1 °F (36.7 °C) (11/10/19 1144)  Pulse: 78 (11/10/19 1144)  Resp: 14 (11/10/19 1144)  BP: (!) 155/81 (11/10/19 1144)  SpO2: 99 % (11/10/19 1144) Vital Signs (24h Range):  Temp:  [97.6 °F (36.4 °C)-98.5 °F (36.9 °C)] 98.1 °F (36.7 °C)  Pulse:  [70-81] 78  Resp:  [14-20] 14  SpO2:  [98 %-100 %] 99 %  BP: (129-167)/(81-93) 155/81     Weight: 57 kg (125 lb 10.6 oz)  Body mass index is 20.28 kg/m².    Intake/Output Summary (Last 24 hours) at 11/10/2019 1258  Last data filed at 11/10/2019 0600  Gross per 24 hour   Intake --   Output 0 ml   Net 0 ml      Physical Exam   Constitutional: He appears well-developed. No distress.   HENT:   Head: Normocephalic and atraumatic.   Eyes: Conjunctivae and EOM are normal. No scleral icterus.   Neck: Normal range of motion. Neck supple.   Cardiovascular: Normal rate, regular rhythm, normal heart sounds and intact distal pulses.   Pulmonary/Chest: Effort normal and breath sounds normal. No respiratory distress. He has no wheezes. He has no rales.   Abdominal: Soft. He exhibits no distension. There is no tenderness. There is no rebound and no guarding.   Musculoskeletal: Normal range of motion. He exhibits no edema.   Neurological: He is alert.   Skin: Skin is warm and dry. He is not diaphoretic.    Psychiatric: He has a normal mood and affect. Judgment and thought content normal.       Significant Labs:   CBC:   Recent Labs   Lab 11/09/19  0930 11/10/19  0007 11/10/19  0756   WBC 6.82 7.68 7.36   HGB 10.4* 11.6* 11.1*   HCT 34.3* 37.4* 35.5*    152 157     CMP:   Recent Labs   Lab 11/08/19  1622 11/09/19  0930 11/10/19  0536    145 138   K 4.3 4.5 4.3    104 105   CO2 28 27 25    82 67*   BUN 16 25* 17   CREATININE 4.0* 5.7* 4.5*   CALCIUM 8.4* 8.2* 8.4*   PROT 8.2 7.7 7.9   ALBUMIN 2.9* 2.7* 2.8*   BILITOT 0.4 0.5 0.5   ALKPHOS 257* 234* 235*   AST 28 22 21   ALT 17 14 15   ANIONGAP 13 14 8   EGFRNONAA 15.8* 10.3* 13.7*     All pertinent labs within the past 24 hours have been reviewed.    Significant Imaging: I have reviewed all pertinent imaging results/findings within the past 24 hours.

## 2019-11-10 NOTE — ASSESSMENT & PLAN NOTE
-dietary management              -eating small, low-fat, low-fiber meals 4-5 times/day              -copious non carbonated fluid intake              -dietitian consulted  -Zofran PRN. Will consider metoclopramide if no evidence of ongoing GI hemorrhage as contraindicated in GI hemorrhage

## 2019-11-11 NOTE — NURSING
Medicine 2 called in regards to pt inability to keep down the Golytely for testing this am. If 1 cup is taken of Golytely, the p tspits up and/or vomits 4 X as much light green slimy fluid.

## 2019-11-11 NOTE — TREATMENT PLAN
GI Treatment Plan    Patient not tolerating prep. Reschedule EGD and colonoscopy for tomorrow. Clear liquids today. Repeat prep tonight and NPO at midnight.  If unable to tolerate prep again, will only do EGD tomorrow and colonoscopy as outpatient.    Thank you for involving us in the care of Vaughn Retana. We will continue to follow. Please call with any additional questions, concerns or changes in the patient's clinical status.      Fabio Jones MD  Gastroenterology Fellow, PGY4  [facility]

## 2019-11-11 NOTE — PT/OT/SLP PROGRESS
Physical Therapy      Patient Name:  Vaughn Retana   MRN:  [id number]    Patient not seen today secondary to Dialysis. Will follow-up next scheduled date.    Yimi Mark, PT   11/11/2019

## 2019-11-11 NOTE — PROGRESS NOTES
NEPHROLOGY HEMODIALYSIS NOTE    Vaughn Retana is a 54 y.o. male currently on hemodialysis for removal of uremic toxins and volume management.     Patient seen and evaluated on hemodialysis, tolerating treatment, see HD flowsheet for vitals and assessments.    No Hypotension, chest pain, shortness of breath, cramping, nausea or vomiting.      Labs have been reviewed and the dialysate bath has been adjusted.    Labs:      Recent Labs   Lab 11/09/19  0930 11/10/19  0536 11/11/19  0653    138 140   K 4.5 4.3 4.5    105 106   CO2 27 25 21*   BUN 25* 17 31*   CREATININE 5.7* 4.5* 6.8*   CALCIUM 8.2* 8.4* 8.7   PHOS 5.0* 4.0 4.8*       Recent Labs   Lab 11/09/19  0930 11/10/19  0007 11/10/19  0756   WBC 6.82 7.68 7.36   HGB 10.4* 11.6* 11.1*   HCT 34.3* 37.4* 35.5*    152 157          Assessment/Plan:  - Seen on dialysis this morning, tolerating session with current UFR, no complications.    - Ultrafiltration goal: 500ml-1L as tolerated.  - Will continue dialysis treatments while in-patient  - Continue to monitor intake and output   - Renally dose medications  - Pre/Post dialysis treatment weights  - Renal diet  - Will continue to follow closely.      Scottie Florence MD  Nephrology Fellow  [facility]

## 2019-11-11 NOTE — CONSULTS
Food & Nutrition  Education    Diet Education: Gastroparesis  Time Spent: 15 minutes  Learners: Pt    Nutrition Education provided with handouts: Gastroparesis Nutrition Therapy    Comments: Pt resting in bed under the covers this afternoon, reports he was listening to the education provided today but otherwise w/ minimal interaction. Reviewed consuming smaller, more frequent meals and avoiding high fat and high fiber foods. Encouraged pt to consume protein foods first and avoid drinking fluids w/ meals to maximize caloric intake of food. Encouraged pt to avoid caffeine, acidic foods, and to wait at least 3-4 hrs after eating before lying down. Pt voiced understanding; handout left at bedside.    All questions and concerns answered. Dietitian's contact information provided.     Follow-Up: Yes    Please Re-consult as needed  Thanks!

## 2019-11-11 NOTE — PT/OT/SLP PROGRESS
Occupational Therapy      Patient Name:  Vaughn Retana   MRN:  [id number]    Patient not seen today secondary to pt at [facility]. Will follow-up as able.    Catrachita Higgins OT  11/11/2019

## 2019-11-11 NOTE — HOSPITAL COURSE
Patient admitted for workup of active GI bleed. Throughout the admission he has been monitored for hemodynamic status and has been stable since admit. However, he unfortunately has been refusing several scheduled labs. EGD and colonoscopy planned for 11/12. If prep not adequate, will just do EGD and plan for outpatient colonoscopy, as hematemesis was likely upper GI source.     11/12 EGD performed:  Impression:  - LA Grade D reflux esophagitis, worrisome for underlying Mejia's esophagus.  - Small hiatal hernia.  - Erythematous mucosa in the stomach. Biopsied.  - One gastric polyp. Biopsied.  - Normal examined duodenum.    Recommendation:         - [facility].  - Resume previous diet.  - Use Protonix (pantoprazole) 40 mg orally every 12 hours.  - Await pathology results.  - Telephone GI clinic for pathology results in 7  days.  - Repeat upper endoscopy in 8 weeks to check healing (order has been placed).  - Will also need outpatient colon but we would like his esophagitis with associated nausea/emesis resolved first before scheduling for a colonoscopy in order to tolerate prep.  - Need outpatient GI follow up which we will work on scheduling.    Patient's Hgb has remained stable and he tolerated the EGD well. Stable for discharge back to NH on 11/13. Plan for follow up with GI as above for repeat EGD in 8 weeks.

## 2019-11-11 NOTE — ASSESSMENT & PLAN NOTE
Patient with ESRD with MWF HD, unsure of last dialysis as patient is not answering questions  Unlikely to have gotten 11/8 dialysis as he was in ED; underwent inpatient dialysis 11/9    Currently hypertensive on admission    Plan:  - inpatient HD MWF. Appreciate nephro assistance.

## 2019-11-11 NOTE — PROGRESS NOTES
[facility] Hospital Medicine  Progress Note    Patient Name: Vaughn Retana  MRN: [id number]  Patient Class: IP- Inpatient   Admission Date: 11/8/2019  Length of Stay: 2 days  Attending Physician: Jewels Saldivar MD  Primary Care Provider: Primary Doctor [facility] Medicine Team: [facility] HOSP MED 2 Nazia Washington MD    Subjective:     Principal Problem:Gastrointestinal hemorrhage with hematemesis        HPI:  Mr. 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 Patient did no cooperate with interview for ED or medicine physicians as well as with nursing staff. He will occasionally nod head or grunt in response and will only occasionally follow commands, however patient was noted to be complaining of pain in ED and has an emesis bag with appx 400cc of ground coffee ground emesis.     Of note, patient's most recent admission for the same problem was on 9/28 with numerous other visits before that date. Patient was transfused multiple units of blood on that admission and was discharged following treatment with protonix and sucralfate and after an EGD revealed a 3cm hiatal hernia with LA Grade D esophagitis. Of note, EGD from 9/23 demonstrated the same findings without any active GI bleed.    Overview/Hospital Course:  Patient admitted for workup of active GI bleed. Throughout the admission he has been monitored for hemodynamic status and has been stable since admit. However, he unfortunately has been refusing several scheduled labs. EGD and colonoscopy planned for 11/12. If prep not adequate, will just do EGD and plan for outpatient colonoscopy, as hematemesis was likely upper GI source.     Interval History: patient feels well this AM. Reports no vomiting. Did not  have sufficient prep for colonoscopy today, so rescheduled for tomorrow. If inadequate, will only do EGD per GI.    Review of Systems   Constitutional: Negative for chills and fever.   Eyes: Negative for redness and visual disturbance.   Respiratory: Negative for cough and shortness of breath.    Cardiovascular: Negative for chest pain and leg swelling.   Gastrointestinal: Positive for abdominal pain and vomiting.   Genitourinary: Negative for dysuria and hematuria.   Skin: Negative for color change and pallor.     Objective:     Vital Signs (Most Recent):  Temp: 98.2 °F (36.8 °C) (11/11/19 0852)  Pulse: 87 (11/11/19 1115)  Resp: 20 (11/11/19 0852)  BP: 115/77 (11/11/19 1115)  SpO2: 100 % (11/11/19 0829) Vital Signs (24h Range):  Temp:  [97.9 °F (36.6 °C)-98.9 °F (37.2 °C)] 98.2 °F (36.8 °C)  Pulse:  [75-87] 87  Resp:  [14-20] 20  SpO2:  [99 %-100 %] 100 %  BP: (115-183)/(72-96) 115/77     Weight: 57 kg (125 lb 10.6 oz)  Body mass index is 20.28 kg/m².    Intake/Output Summary (Last 24 hours) at 11/11/2019 1120  Last data filed at 11/10/2019 2000  Gross per 24 hour   Intake 200 ml   Output 600 ml   Net -400 ml      Physical Exam   Constitutional: He appears well-developed. No distress.   HENT:   Head: Normocephalic and atraumatic.   Eyes: Conjunctivae and EOM are normal. No scleral icterus.   Neck: Normal range of motion. Neck supple.   Cardiovascular: Normal rate, regular rhythm, normal heart sounds and intact distal pulses.   Pulmonary/Chest: Effort normal and breath sounds normal. No respiratory distress. He has no wheezes. He has no rales.   Abdominal: Soft. He exhibits no distension. There is no tenderness. There is no rebound and no guarding.   Musculoskeletal: Normal range of motion. He exhibits no edema.   Neurological: He is alert.   Skin: Skin is warm and dry. He is not diaphoretic.   Psychiatric: He has a normal mood and affect. Judgment and thought content normal.       Significant Labs:   CBC:   Recent  Labs   Lab 11/10/19  0007 11/10/19  0756 11/11/19  0828   WBC 7.68 7.36 6.09   HGB 11.6* 11.1* 11.0*   HCT 37.4* 35.5* 35.1*    157 176     CMP:   Recent Labs   Lab 11/10/19  0536 11/11/19  0653    140   K 4.3 4.5    106   CO2 25 21*   GLU 67* 70   BUN 17 31*   CREATININE 4.5* 6.8*   CALCIUM 8.4* 8.7   PROT 7.9 7.9   ALBUMIN 2.8* 2.7*   BILITOT 0.5 0.4   ALKPHOS 235* 223*   AST 21 19   ALT 15 13   ANIONGAP 8 13   EGFRNONAA 13.7* 8.3*     All pertinent labs within the past 24 hours have been reviewed.    Significant Imaging: I have reviewed all pertinent imaging results/findings within the past 24 hours.      Assessment/Plan:      * Gastrointestinal hemorrhage with hematemesis  -GI consulted, EGD/Colonscopy 11/11  -Transfuse pRBC for Hb < 7 g/dL. Although, patient has been refusing scheduled labs. Counseled and encouraged that we would like to check his CBCs in particular to monitor Hgb.  -IV PPI 40 BID  -Avoid nonsteroidal agents, antiplatelet agents and anticoagulants if possible in patients without absolute contraindications  -Clear liquid diet; NPO from midnight 11/12, colon prep at bedside. EGD/colonoscopy 11/12. (did not have adequate prep for originally scheduled 11/11 scope so postponed to 11/12).    Gastroparesis  -dietary management              -eating small, low-fat, low-fiber meals 4-5 times/day              -copious non carbonated fluid intake              -dietitian consulted  -Zofran PRN. Will consider metoclopramide if no evidence of ongoing GI hemorrhage as contraindicated in GI hemorrhage    Personal history of latent tuberculosis infection  -No respiratory symptoms this admission  -PPD negative one month ago  -CXR without acute findings; no   -prior AFB cultures (including 12/2018) negative.   -Previously referred to Infectious disease to discuss latent TB prophylaxis in 2015, though lost to follow up.   -No current indication for negative isolation or further testing; will  consider outpatient referral to ID on discharge.     Erosive gastritis  See chronic upper GI bleeding      Chronic upper GI bleeding  Patient with numerous recent hospital visits for hematemesis and associated abdominal pain presented with same complaint and with 400cc coffee ground emesis. He is noncompliant with history taking and examination in ED and on floor.     Hb 13.4 (bl 7-9). Cr 4 (bl 4-5). Trop 0.115 and hypertensive on admission.  CXR and CThead with no acute findings. Patient received protonix 80 IV, carvedilol 3.125, and metoclopramide IV 10 in ED.    Last EGD (9/2019) revealed a 3cm hiatal hernia with LA Grade D esophagitis. EGD from 9/23 demonstrated the same findings without any active GI bleed. Numerous others in chart review with no significant changes.    Home meds: metoclopramide 10 TID, protonix 40 po bid, sucralfate 10mls (1g total) qid    Plan:  - q6h CBC; trend hb  - GI consult, appreciate recs  - protonix 40 IV bid  - NPO  - hold sucralfate     ESRD on hemodialysis  Patient with ESRD with MWF HD, unsure of last dialysis as patient is not answering questions  Unlikely to have gotten 11/8 dialysis as he was in ED; underwent inpatient dialysis 11/9    Currently hypertensive on admission    Plan:  - inpatient HD MWF. Appreciate nephro assistance.      VTE Risk Mitigation (From admission, onward)         Ordered     Place sequential compression device  Until discontinued      11/09/19 0219     IP VTE HIGH RISK PATIENT  Once      11/09/19 0219                      Nazia Washington MD  Department of Hospital Medicine   [facility]

## 2019-11-11 NOTE — ASSESSMENT & PLAN NOTE
-GI consulted, EGD/Colonscopy 11/11  -Transfuse pRBC for Hb < 7 g/dL. Although, patient has been refusing scheduled labs. Counseled and encouraged that we would like to check his CBCs in particular to monitor Hgb.  -IV PPI 40 BID  -Avoid nonsteroidal agents, antiplatelet agents and anticoagulants if possible in patients without absolute contraindications  -Clear liquid diet; NPO from midnight 11/12, colon prep at bedside. EGD/colonoscopy 11/12. (did not have adequate prep for originally scheduled 11/11 scope so postponed to 11/12).

## 2019-11-11 NOTE — PROGRESS NOTES
Pt completed 2.5 hour dialysis.  Blood returned early due to large clot noted in venous chamber.   at bedside.  Treatment ended.  No UF per md order. Remove needles from RUE fistula, drsg applied.  Hemostatasis obtained.  Pressure drsg applied to upper arm.  Positive thrill and bruit.

## 2019-11-11 NOTE — SUBJECTIVE & OBJECTIVE
Interval History: patient feels well this AM. Reports no vomiting. Did not have sufficient prep for colonoscopy today, so rescheduled for tomorrow. If inadequate, will only do EGD per GI.    Review of Systems   Constitutional: Negative for chills and fever.   Eyes: Negative for redness and visual disturbance.   Respiratory: Negative for cough and shortness of breath.    Cardiovascular: Negative for chest pain and leg swelling.   Gastrointestinal: Positive for abdominal pain and vomiting.   Genitourinary: Negative for dysuria and hematuria.   Skin: Negative for color change and pallor.     Objective:     Vital Signs (Most Recent):  Temp: 98.2 °F (36.8 °C) (11/11/19 0852)  Pulse: 87 (11/11/19 1115)  Resp: 20 (11/11/19 0852)  BP: 115/77 (11/11/19 1115)  SpO2: 100 % (11/11/19 0829) Vital Signs (24h Range):  Temp:  [97.9 °F (36.6 °C)-98.9 °F (37.2 °C)] 98.2 °F (36.8 °C)  Pulse:  [75-87] 87  Resp:  [14-20] 20  SpO2:  [99 %-100 %] 100 %  BP: (115-183)/(72-96) 115/77     Weight: 57 kg (125 lb 10.6 oz)  Body mass index is 20.28 kg/m².    Intake/Output Summary (Last 24 hours) at 11/11/2019 1120  Last data filed at 11/10/2019 2000  Gross per 24 hour   Intake 200 ml   Output 600 ml   Net -400 ml      Physical Exam   Constitutional: He appears well-developed. No distress.   HENT:   Head: Normocephalic and atraumatic.   Eyes: Conjunctivae and EOM are normal. No scleral icterus.   Neck: Normal range of motion. Neck supple.   Cardiovascular: Normal rate, regular rhythm, normal heart sounds and intact distal pulses.   Pulmonary/Chest: Effort normal and breath sounds normal. No respiratory distress. He has no wheezes. He has no rales.   Abdominal: Soft. He exhibits no distension. There is no tenderness. There is no rebound and no guarding.   Musculoskeletal: Normal range of motion. He exhibits no edema.   Neurological: He is alert.   Skin: Skin is warm and dry. He is not diaphoretic.   Psychiatric: He has a normal mood and affect.  Judgment and thought content normal.       Significant Labs:   CBC:   Recent Labs   Lab 11/10/19  0007 11/10/19  0756 11/11/19  0828   WBC 7.68 7.36 6.09   HGB 11.6* 11.1* 11.0*   HCT 37.4* 35.5* 35.1*    157 176     CMP:   Recent Labs   Lab 11/10/19  0536 11/11/19  0653    140   K 4.3 4.5    106   CO2 25 21*   GLU 67* 70   BUN 17 31*   CREATININE 4.5* 6.8*   CALCIUM 8.4* 8.7   PROT 7.9 7.9   ALBUMIN 2.8* 2.7*   BILITOT 0.5 0.4   ALKPHOS 235* 223*   AST 21 19   ALT 15 13   ANIONGAP 8 13   EGFRNONAA 13.7* 8.3*     All pertinent labs within the past 24 hours have been reviewed.    Significant Imaging: I have reviewed all pertinent imaging results/findings within the past 24 hours.

## 2019-11-12 PROBLEM — N18.6 PULMONARY HYPERTENSION ASSOCIATED WITH END-STAGE RENAL DISEASE (ESRD) ON DIALYSIS: Chronic | Status: ACTIVE | Noted: 2017-03-23

## 2019-11-12 PROBLEM — I27.29 PULMONARY HYPERTENSION ASSOCIATED WITH END-STAGE RENAL DISEASE (ESRD) ON DIALYSIS: Chronic | Status: ACTIVE | Noted: 2017-03-23

## 2019-11-12 PROBLEM — K92.2 UPPER GI BLEED: Status: RESOLVED | Noted: 2019-01-01 | Resolved: 2019-01-01

## 2019-11-12 PROBLEM — Z99.2 PULMONARY HYPERTENSION ASSOCIATED WITH END-STAGE RENAL DISEASE (ESRD) ON DIALYSIS: Chronic | Status: ACTIVE | Noted: 2017-03-23

## 2019-11-12 PROBLEM — R79.89 ELEVATED TROPONIN: Status: RESOLVED | Noted: 2018-03-07 | Resolved: 2019-01-01

## 2019-11-12 NOTE — H&P
Short Stay Endoscopy History and Physical    PCP - Primary Doctor No     Procedure - EGD  ASA - per anesthesia  Mallampati - per anesthesia  History of Anesthesia problems - no  Family history Anesthesia problems -  no   Plan of anesthesia - General    HPI:  54 y.o. 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    No current nausea or emesis with last episode of hematemesis yesterday.     ROS:  Constitutional: No fevers, chills, No weight loss  CV: No chest pain  Pulm: No cough, No shortness of breath  Ophtho: No vision changes  GI: see HPI  Derm: No rash    Medical History:  has a past medical history of Amputation stump pain (9/10/2013), Aspiration pneumonia (7/27/2015), Asterixis (11/8/2016), C. difficile colitis (8/7/2015), Cholelithiasis without obstruction (8/25/2015), Chronic diastolic heart failure, Chronic low back pain (12/1/2015), Closed head injury (9/8/2016), DVT (deep venous thrombosis) (7/28/2017), ESRD on hemodialysis (2/7/2013), GERD (gastroesophageal reflux disease), HCV antibody positive, Hemiparesis affecting left side as late effect of stroke (11/08/2016), History of Intracerebral Hemorrhage: L BG 5/2013; R BG 9/2016; R BG 11/2016; L caudate head 2/2017 (11/2/2016), Hypertension, left basal ganglia ICH 5/2013 (11/2/2016), Left Caudate Head ICH 2/22/2017 (2/24/2017), 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    Surgical History:  has a past surgical history that includes R AVF (9/12/12); Leg amputation through knee (12/18/2013);  Foot amputation through metatarsal; Colonoscopy; Colonoscopy (N/A, 4/4/2017); Esophagogastroduodenoscopy (N/A, 6/12/2018); Upper gastrointestinal endoscopy; Esophagogastroduodenoscopy (N/A, 3/7/2019); Esophagogastroduodenoscopy (N/A, 9/23/2019); and Esophagogastroduodenoscopy (N/A, 10/2/2019).    Family History: family history includes Alcohol abuse in his maternal grandmother; Diabetes in his brother and maternal grandfather; Early death in his mother; Heart disease in his father; Hyperlipidemia in his father; Hypertension in his father and sister; Kidney disease in his father.. Otherwise no colon cancer, inflammatory bowel disease, or GI malignancies.    Social History:  reports that he has quit smoking. He has a 10.00 pack-year smoking history. He has never used smokeless tobacco. He reports that he does not drink alcohol or use drugs.    Review of patient's allergies indicates:   Allergen Reactions    Fosrenol [lanthanum] Nausea And Vomiting     Nausea and vomiting       Medications:   Medications Prior to Admission   Medication Sig Dispense Refill Last Dose    acetaminophen (TYLENOL) 325 MG tablet Take 2 tablets (650 mg total) by mouth every 8 (eight) hours as needed.  0 11/7/2019    carvedilol (COREG) 3.125 MG tablet Take 1 tablet (3.125 mg total) by mouth 2 (two) times daily with meals. 60 tablet 11 11/7/2019    metoclopramide HCl (REGLAN) 10 MG tablet Take 1 tablet (10 mg total) by mouth 3 (three) times daily before meals. 90 tablet 1 11/7/2019    midodrine (PROAMATINE) 5 MG Tab Please take 30 min before dialysis on Monday Wednesday and Friday 60 tablet 3 11/7/2019    ondansetron (ZOFRAN) 8 MG tablet Take 1 tablet (8 mg total) by mouth every 12 (twelve) hours as needed for Nausea. 60 tablet 1 11/7/2019    pantoprazole (PROTONIX) 40 MG tablet Take 1 tablet (40 mg total) by mouth 2 (two) times daily. 60 tablet 6 11/7/2019    RENAPLEX-D 800 mcg-12.5 mg -2,000 unit Tab Take 1 tablet by mouth once daily.   3 11/7/2019    sevelamer carbonate (RENVELA) 800 mg Tab Take 1 tablet (800 mg total) by mouth 3 (three) times daily with meals. 90 tablet 3 11/7/2019    sucralfate (CARAFATE) 100 mg/mL suspension Take 10 mLs (1 g total) by mouth 4 (four) times daily before meals and nightly. 420 mL 2 11/7/2019       Physical Exam:    Vital Signs:   Vitals:    11/12/19 0729   BP: (!) 141/83   Pulse: 69   Resp: 15   Temp: 97.4 °F (36.3 °C)       General Appearance: Well appearing in no acute distress  Eyes:    No scleral icterus  ENT: Neck supple, Lips, mucosa, and tongue normal; teeth and gums normal  Lungs: CTA anteriorly  Heart:  Regular rate, S1, S2 normal, no murmurs heard.  Abdomen: Soft, non tender, non distended with normal bowel sounds. No hepatosplenomegaly, ascites, or mass.  Extremities: LLE prosthesis, no edema on the RLE  Skin: No rash    Labs:  Lab Results   Component Value Date    WBC 6.18 11/12/2019    HGB 11.5 (L) 11/12/2019    HCT 37.2 (L) 11/12/2019     11/12/2019    CHOL 111 (L) 06/22/2019    TRIG 59 06/22/2019    HDL 40 06/22/2019    ALT 10 11/12/2019    AST 18 11/12/2019     11/12/2019    K 4.4 11/12/2019     11/12/2019    CREATININE 5.9 (H) 11/12/2019    BUN 25 (H) 11/12/2019    CO2 24 11/12/2019    TSH 0.699 11/09/2019    PSA 1.7 10/03/2018    INR 1.1 11/11/2019    HGBA1C 4.0 11/09/2019       I have explained the risks and benefits of endoscopy procedures to the patient including but not limited to bleeding, perforation, infection, and death.      Rodri Sheets M.D.  Gastroenterology Fellow, PGY-VI  Pager: [phone]  [facility]

## 2019-11-12 NOTE — PLAN OF CARE
SW faxed referral to [facility] via  for review. Patient was previously there prior to inpatient stay. SW will continue to follow.        11/12/19 1435   Post-Acute Status   Post-Acute Authorization Placement   Post-Acute Placement Status Referrals Sent     4:06 PM  Patient was accepted to [facility]. SW will continue to follow.     Nidhi Javier LMSW   - [facility]  Ext. [phone]

## 2019-11-12 NOTE — TREATMENT PLAN
GI Treatment Plan  11/12/2019  1:45 PM    EGD completed with impression and recs below.    Impression:             - LA Grade D reflux esophagitis, worrisome for underlying Mejia's esophagus.  - Small hiatal hernia.  - Erythematous mucosa in the stomach. Biopsied.  - One gastric polyp. Biopsied.  - Normal examined duodenum.    Recommendation:       - [facility].  - Resume previous diet.  - Use Protonix (pantoprazole) 40 mg orally every 12 hours (electronic and paper script provided).  - Await pathology results.  - Telephone GI clinic for pathology results in 7  days.  - Repeat upper endoscopy in 8 weeks to check healing (order has been placed).  - Will also need outpatient colon but we would like his esophagitis with associated nausea/emesis resolved first before scheduling for a colonoscopy in order to tolerate prep.  - Need outpatient GI follow up which we will work on scheduling.  - Findings discussed with team and patient.  - We thank you for this consultation, we will sign off at this time, please call with any additional questions or concerns     Rodri Sheets M.D.  Gastroenterology Fellow, PGY-VI  Pager: [phone]  [facility]

## 2019-11-12 NOTE — PROGRESS NOTES
[facility] Hospital Medicine  Progress Note    Patient Name: Vaughn Retana  MRN: [id number]  Patient Class: IP- Inpatient   Admission Date: 11/8/2019  Length of Stay: 3 days  Attending Physician: Kait Gongora MD  Primary Care Provider: Primary Doctor [facility] Medicine Team: [facility] HOSP MED 2 Nazia Washington MD    Subjective:     Principal Problem:Gastrointestinal hemorrhage with hematemesis        HPI:  Mr. 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 Patient did no cooperate with interview for ED or medicine physicians as well as with nursing staff. He will occasionally nod head or grunt in response and will only occasionally follow commands, however patient was noted to be complaining of pain in ED and has an emesis bag with appx 400cc of ground coffee ground emesis.     Of note, patient's most recent admission for the same problem was on 9/28 with numerous other visits before that date. Patient was transfused multiple units of blood on that admission and was discharged following treatment with protonix and sucralfate and after an EGD revealed a 3cm hiatal hernia with LA Grade D esophagitis. Of note, EGD from 9/23 demonstrated the same findings without any active GI bleed.    Overview/Hospital Course:  Patient admitted for workup of active GI bleed. Throughout the admission he has been monitored for hemodynamic status and has been stable since admit. However, he unfortunately has been refusing several scheduled labs. EGD and colonoscopy planned for 11/12. If prep not adequate, will just do EGD and plan for outpatient colonoscopy, as hematemesis was likely upper GI source.     Interval History: patient feels well this AM. Reports no vomiting. Plan for  EGD +/- colonoscopy depending on golytely prep for colonoscopy.    Review of Systems   Constitutional: Negative for chills and fever.   Eyes: Negative for redness and visual disturbance.   Respiratory: Negative for cough and shortness of breath.    Cardiovascular: Negative for chest pain and leg swelling.   Gastrointestinal: Positive for abdominal pain and vomiting.   Genitourinary: Negative for dysuria and hematuria.   Skin: Negative for color change and pallor.     Objective:     Vital Signs (Most Recent):  Temp: 97.4 °F (36.3 °C) (11/12/19 0729)  Pulse: 69 (11/12/19 0729)  Resp: 15 (11/12/19 0729)  BP: (!) 141/83 (11/12/19 0729)  SpO2: 100 % (11/12/19 0729) Vital Signs (24h Range):  Temp:  [97.4 °F (36.3 °C)-98.6 °F (37 °C)] 97.4 °F (36.3 °C)  Pulse:  [69-88] 69  Resp:  [15-20] 15  SpO2:  [97 %-100 %] 100 %  BP: (111-170)/(75-91) 141/83     Weight: 57 kg (125 lb 10.6 oz)  Body mass index is 20.28 kg/m².    Intake/Output Summary (Last 24 hours) at 11/12/2019 0823  Last data filed at 11/11/2019 1700  Gross per 24 hour   Intake 1030 ml   Output 1226 ml   Net -196 ml      Physical Exam   Constitutional: He appears well-developed. No distress.   HENT:   Head: Normocephalic and atraumatic.   Eyes: Conjunctivae and EOM are normal. No scleral icterus.   Neck: Normal range of motion. Neck supple.   Cardiovascular: Normal rate, regular rhythm, normal heart sounds and intact distal pulses.   Pulmonary/Chest: Effort normal and breath sounds normal. No respiratory distress. He has no wheezes. He has no rales.   Abdominal: Soft. He exhibits no distension. There is no tenderness. There is no rebound and no guarding.   Musculoskeletal: Normal range of motion. He exhibits no edema.   Neurological: He is alert.   Skin: Skin is warm and dry. He is not diaphoretic.   Psychiatric: He has a normal mood and affect. Judgment and thought content normal.       Significant Labs:   CBC:   Recent Labs   Lab 11/11/19  0828 11/11/19  1061    WBC 6.09 12.41   HGB 11.0* 12.4*   HCT 35.1* 38.8*    160     CMP:   Recent Labs   Lab 11/11/19  0653 11/12/19  0647    137   K 4.5 4.4    101   CO2 21* 24   GLU 70 73   BUN 31* 25*   CREATININE 6.8* 5.9*   CALCIUM 8.7 9.2   PROT 7.9 8.3   ALBUMIN 2.7* 2.8*   BILITOT 0.4 0.5   ALKPHOS 223* 231*   AST 19 18   ALT 13 10   ANIONGAP 13 12   EGFRNONAA 8.3* 9.9*     All pertinent labs within the past 24 hours have been reviewed.    Significant Imaging: I have reviewed all pertinent imaging results/findings within the past 24 hours.      Assessment/Plan:      * Gastrointestinal hemorrhage with hematemesis  -EGD +/- colonoscopy 11/12. F/u GI recs.  -Transfuse pRBC for Hb < 7 g/dL. Although, patient has been refusing scheduled labs. Counseled and encouraged that we would like to check his CBCs in particular to monitor Hgb. Most recent Hgb 12.4 on 11/11 PM.  -IV PPI 40 BID  -Avoid NSAIDs, antiplatelet agents and anticoagulants if possible in patients without absolute contraindications      Gastroparesis  -dietary management              -eating small, low-fat, low-fiber meals 4-5 times/day              -copious non carbonated fluid intake              -dietitian consulted  -Zofran PRN. Will consider metoclopramide if no evidence of ongoing GI hemorrhage as contraindicated in GI hemorrhage    Personal history of latent tuberculosis infection  -No respiratory symptoms this admission  -PPD negative one month ago  -CXR without acute findings; no   -prior AFB cultures (including 12/2018) negative.   -Previously referred to Infectious disease to discuss latent TB prophylaxis in 2015, though lost to follow up.   -No current indication for negative isolation or further testing; will consider outpatient referral to ID on discharge.     Erosive gastritis  See chronic upper GI bleeding      Chronic upper GI bleeding  Patient with numerous recent hospital visits for hematemesis and associated abdominal pain presented  with same complaint and with 400cc coffee ground emesis. He is noncompliant with history taking and examination in ED and on floor.     Hb 13.4 (bl 7-9). Cr 4 (bl 4-5). Trop 0.115 and hypertensive on admission.  CXR and CThead with no acute findings. Patient received protonix 80 IV, carvedilol 3.125, and metoclopramide IV 10 in ED.    Last EGD (9/2019) revealed a 3cm hiatal hernia with LA Grade D esophagitis. EGD from 9/23 demonstrated the same findings without any active GI bleed. Numerous others in chart review with no significant changes.    Home meds: metoclopramide 10 TID, protonix 40 po bid, sucralfate 10mls (1g total) qid        ESRD on hemodialysis  Patient with ESRD with MWF HD, unsure of last dialysis as patient is not answering questions  Unlikely to have gotten 11/8 dialysis as he was in ED; underwent inpatient dialysis 11/9    Currently hypertensive on admission    Plan:  - inpatient HD MWF. Appreciate nephro assistance.      VTE Risk Mitigation (From admission, onward)         Ordered     Place sequential compression device  Until discontinued      11/09/19 0219     IP VTE HIGH RISK PATIENT  Once      11/09/19 0219                      Nazia Washington MD  Department of Hospital Medicine   [facility]

## 2019-11-12 NOTE — PT/OT/SLP PROGRESS
Physical Therapy      Patient Name:  Vaughn Retana   MRN:  [id number]    Patient not seen today secondary to Unavailable (Comment)(pt out of room for EGD). Will follow-up at a later date.    Anais Ayers, SPT

## 2019-11-12 NOTE — ASSESSMENT & PLAN NOTE
Patient with numerous recent hospital visits for hematemesis and associated abdominal pain presented with same complaint and with 400cc coffee ground emesis. He is noncompliant with history taking and examination in ED and on floor.     Hb 13.4 (bl 7-9). Cr 4 (bl 4-5). Trop 0.115 and hypertensive on admission.  CXR and CThead with no acute findings. Patient received protonix 80 IV, carvedilol 3.125, and metoclopramide IV 10 in ED.    Last EGD (9/2019) revealed a 3cm hiatal hernia with LA Grade D esophagitis. EGD from 9/23 demonstrated the same findings without any active GI bleed. Numerous others in chart review with no significant changes.    Home meds: metoclopramide 10 TID, protonix 40 po bid, sucralfate 10mls (1g total) qid

## 2019-11-12 NOTE — [employer] PATIENT INSTRUCTIONS
Discharge Summary/Instructions after an Endoscopic Procedure  Patient Name: Vaughn Retana  Patient MRN: [id number]  Patient YOB: 1965 Tuesday, November 12, 2019  Kevin De La Paz MD  RESTRICTIONS:  During your procedure today, you received medications for sedation.  These   medications may affect your judgment, balance and coordination.  Therefore,   for 24 hours, you have the following restrictions:   - DO NOT drive a car, operate machinery, make legal/financial decisions,   sign important papers or drink alcohol.    ACTIVITY:  Today: no heavy lifting, straining or running due to procedural   sedation/anesthesia.  The following day: return to full activity including work.  DIET:  Eat and drink normally unless instructed otherwise.     TREATMENT FOR COMMON SIDE EFFECTS:  - Mild abdominal pain, nausea, belching, bloating or excessive gas:  rest,   eat lightly and use a heating pad.  - Sore Throat: treat with throat lozenges and/or gargle with warm salt   water.  - Because air was used during the procedure, expelling large amounts of air   from your rectum or belching is normal.  - If a bowel prep was taken, you may not have a bowel movement for 1-3 days.    This is normal.  SYMPTOMS TO WATCH FOR AND REPORT TO YOUR PHYSICIAN:  1. Abdominal pain or bloating, other than gas cramps.  2. Chest pain.  3. Back pain.  4. Signs of infection such as: chills or fever occurring within 24 hours   after the procedure.  5. Rectal bleeding, which would show as bright red, maroon, or black stools.   (A tablespoon of blood from the rectum is not serious, especially if   hemorrhoids are present.)  6. Vomiting.  7. Weakness or dizziness.  GO DIRECTLY TO THE NEAREST EMERGENCY ROOM IF YOU HAVE ANY OF THE FOLLOWING:      Difficulty breathing              Chills and/or fever over 101 F   Persistent vomiting and/or vomiting blood   Severe abdominal pain   Severe chest pain   Black, tarry stools   Bleeding- more than one  tablespoon   Any other symptom or condition that you feel may need urgent attention  Your doctor recommends these additional instructions:  If any biopsies were taken, your doctors clinic will contact you in 1 to 2   weeks with any results.  - [facility] patient to [facility] for [facility].   - Resume previous diet.   - Use Protonix (pantoprazole) 40 mg orally every 12 hours.   - Await pathology results.   - Telephone GI clinic for pathology results in 7 days.   - Repeat upper endoscopy in 8 weeks to check healing.   - The findings and recommendations were discussed with the patient's primary   physician.   - The findings and recommendations were discussed with the patient.  For questions, problems or results please call your physician - Kevin De La Paz MD at Work:  [phone].  [facility], EMERGENCY ROOM PHONE NUMBER: [phone]  IF A COMPLICATION OR EMERGENCY SITUATION ARISES AND YOU ARE UNABLE TO REACH   YOUR PHYSICIAN - GO DIRECTLY TO THE EMERGENCY ROOM.  Kevin De La Paz MD  11/12/2019 1:37:45 PM  This report has been verified and signed electronically.  [employer]

## 2019-11-12 NOTE — NURSING TRANSFER
Nursing Transfer Note      11/12/2019     Transfer To: 1108    Transfer via stretcher    Transfer with n/a    Transported by PCT    Medicines sent: n/a    Chart send with patient: Yes    Notified: sister agnes    Patient reassessed at:7347 11/12/19

## 2019-11-12 NOTE — PLAN OF CARE
[facility]     Department of Hospital Medicine     [address] [place] [address]     [phone] after hours  [phone] fax       NURSING HOME ORDERS    11/12/2019    Admit to Nursing Home:  Regular Bed         Diagnoses:  Active Hospital Problems    Diagnosis  POA    *Gastrointestinal hemorrhage with hematemesis [K92.0]  Yes    Upper GI bleed [K92.2]  Yes    Gastroparesis [K31.84]  Yes    Personal history of latent tuberculosis infection [Z86.15]  Yes    Erosive gastritis [K29.60]  Yes    Renovascular hypertension [I15.0]  Yes     Chronic    ESRD on hemodialysis [N18.6, Z99.2]  Not Applicable     Chronic     MWF at [facility]        Resolved Hospital Problems   No resolved problems to display.       Patient is homebound due to:  Gastrointestinal hemorrhage with hematemesis    Allergies:  Review of patient's allergies indicates:   Allergen Reactions    Fosrenol [lanthanum] Nausea And Vomiting     Nausea and vomiting       Vitals:       Once weekly/per facility protocol    Diet: regular diet    Acitivities:     - Up in a chair each morning as tolerated   - Ambulate with assistance to bathroom   - May ambulate independently if using appropriate leg prosthesis and equipment   - May use walker, cane, or self-propelled wheelchair    LABS:  Per facility protocol    Nursing Precautions:     - Aspiration precautions:             - Total assistance with meals            -  Upright 90 degrees befor during and after meals             -  Suction at bedside          - Fall precautions per nursing home protocol   - Seizure precaution per group home protocol   - Decubitus precautions:        -  for positioning   - Pressure reducing foam mattress   - Turn patient every two hours. Use wedge pillows to anchor patient    CONSULTS:      Physical Therapy to evaluate and treat     Occupational Therapy to evaluate and treat     Speech Therapy  to evaluate and  treat     Nutrition to evaluate and recommend diet        MISCELLANEOUS CARE:     Routine Skin for Bedridden Patients:  Apply moisture barrier cream to all    skin folds and wet areas in perineal area daily and after baths and                           all bowel movements.    FOLLOW UP:  Follow-up Information     [facility]. Call in 2 weeks.    Specialty:  Gastroenterology  Why:  to get biopsy results.  Contact information:  [facility]  [place] [address] [phone]  Additional information:  [facility] 4th [facility]   [facility]t - [facility] 1[facility]           [facility]. Go in 8 weeks.    Specialty:  Gastroenterology  Why:  repeat EGD procedure  Contact information:  [facility]  [place] [address] [phone]  Additional information:  [facility] 4th Floor   [facility] - [facility]                   Medications: Discontinue all previous medication orders, if any. See new list below.     Vaughn Retana   Home Medication Instructions ANABEL:[id number]    Printed on:11/12/19 4481   Medication Information                      acetaminophen (TYLENOL) 325 MG tablet  Take 2 tablets (650 mg total) by mouth every 8 (eight) hours as needed.             carvedilol (COREG) 3.125 MG tablet  Take 1 tablet (3.125 mg total) by mouth 2 (two) times daily with meals.             metoclopramide HCl (REGLAN) 10 MG tablet  Take 1 tablet (10 mg total) by mouth 3 (three) times daily before meals.             midodrine (PROAMATINE) 5 MG Tab  Please take 30 min before dialysis on Monday Wednesday and Friday             ondansetron (ZOFRAN) 8 MG tablet  Take 1 tablet (8 mg total) by mouth every 12 (twelve) hours as needed for Nausea.             pantoprazole (PROTONIX) 40 MG tablet  Take 1 tablet (40 mg total) by mouth 2 (two) times daily.             RENAPLEX-D 800 mcg-12.5 mg -2,000 unit Tab  Take 1 tablet by mouth once daily.             sevelamer  carbonate (RENVELA) 800 mg Tab  Take 1 tablet (800 mg total) by mouth 3 (three) times daily with meals.             sucralfate (CARAFATE) 100 mg/mL suspension  Take 10 mLs (1 g total) by mouth 4 (four) times daily before meals and nightly.                       _________________________________  Nazia Washington MD  11/12/2019

## 2019-11-12 NOTE — TRANSFER OF CARE
"Anesthesia Transfer of Care Note    Patient: Vaughn Retana    Procedure(s) Performed: Procedure(s) (LRB):  EGD (ESOPHAGOGASTRODUODENOSCOPY) (N/A)    Patient location: PACU    Anesthesia Type: general    Transport from OR: Transported from OR on 6-10 L/min O2 by face mask with adequate spontaneous ventilation    Post pain: adequate analgesia    Post assessment: no apparent anesthetic complications and tolerated procedure well    Post vital signs: stable    Level of consciousness: awake and alert    Nausea/Vomiting: no nausea/vomiting    Complications: none    Transfer of care protocol was followed      Last vitals:   Visit Vitals  /62 (BP Location: Right arm, Patient Position: Lying)   Pulse 83   Temp 36.3 °C (97.3 °F) (Temporal)   Resp 20   Ht 5' 6" (1.676 m)   Wt 57 kg (125 lb 10.6 oz)   SpO2 99%   BMI 20.28 kg/m²     "

## 2019-11-12 NOTE — SUBJECTIVE & OBJECTIVE
Interval History: patient feels well this AM. Reports no vomiting. Plan for EGD +/- colonoscopy depending on golytely prep for colonoscopy.    Review of Systems   Constitutional: Negative for chills and fever.   Eyes: Negative for redness and visual disturbance.   Respiratory: Negative for cough and shortness of breath.    Cardiovascular: Negative for chest pain and leg swelling.   Gastrointestinal: Positive for abdominal pain and vomiting.   Genitourinary: Negative for dysuria and hematuria.   Skin: Negative for color change and pallor.     Objective:     Vital Signs (Most Recent):  Temp: 97.4 °F (36.3 °C) (11/12/19 0729)  Pulse: 69 (11/12/19 0729)  Resp: 15 (11/12/19 0729)  BP: (!) 141/83 (11/12/19 0729)  SpO2: 100 % (11/12/19 0729) Vital Signs (24h Range):  Temp:  [97.4 °F (36.3 °C)-98.6 °F (37 °C)] 97.4 °F (36.3 °C)  Pulse:  [69-88] 69  Resp:  [15-20] 15  SpO2:  [97 %-100 %] 100 %  BP: (111-170)/(75-91) 141/83     Weight: 57 kg (125 lb 10.6 oz)  Body mass index is 20.28 kg/m².    Intake/Output Summary (Last 24 hours) at 11/12/2019 0823  Last data filed at 11/11/2019 1700  Gross per 24 hour   Intake 1030 ml   Output 1226 ml   Net -196 ml      Physical Exam   Constitutional: He appears well-developed. No distress.   HENT:   Head: Normocephalic and atraumatic.   Eyes: Conjunctivae and EOM are normal. No scleral icterus.   Neck: Normal range of motion. Neck supple.   Cardiovascular: Normal rate, regular rhythm, normal heart sounds and intact distal pulses.   Pulmonary/Chest: Effort normal and breath sounds normal. No respiratory distress. He has no wheezes. He has no rales.   Abdominal: Soft. He exhibits no distension. There is no tenderness. There is no rebound and no guarding.   Musculoskeletal: Normal range of motion. He exhibits no edema.   Neurological: He is alert.   Skin: Skin is warm and dry. He is not diaphoretic.   Psychiatric: He has a normal mood and affect. Judgment and thought content normal.        Significant Labs:   CBC:   Recent Labs   Lab 11/11/19  0828 11/11/19  1731   WBC 6.09 12.41   HGB 11.0* 12.4*   HCT 35.1* 38.8*    160     CMP:   Recent Labs   Lab 11/11/19  0653 11/12/19  0647    137   K 4.5 4.4    101   CO2 21* 24   GLU 70 73   BUN 31* 25*   CREATININE 6.8* 5.9*   CALCIUM 8.7 9.2   PROT 7.9 8.3   ALBUMIN 2.7* 2.8*   BILITOT 0.4 0.5   ALKPHOS 223* 231*   AST 19 18   ALT 13 10   ANIONGAP 13 12   EGFRNONAA 8.3* 9.9*     All pertinent labs within the past 24 hours have been reviewed.    Significant Imaging: I have reviewed all pertinent imaging results/findings within the past 24 hours.

## 2019-11-12 NOTE — ASSESSMENT & PLAN NOTE
-EGD +/- colonoscopy 11/12. F/u GI recs.  -Transfuse pRBC for Hb < 7 g/dL. Although, patient has been refusing scheduled labs. Counseled and encouraged that we would like to check his CBCs in particular to monitor Hgb. Most recent Hgb 12.4 on 11/11 PM.  -IV PPI 40 BID  -Avoid NSAIDs, antiplatelet agents and anticoagulants if possible in patients without absolute contraindications

## 2019-11-12 NOTE — PT/OT/SLP PROGRESS
Occupational Therapy  Screen/Discharge      Patient Name:  Vaughn Retana   MRN:  [id number]    Patient screened for OT services.  Pt was AOx3 (not aware of time), and refused OT services.  At this time Pt is homeless, ambulates to the bathroom (confirmed w/ RN). Pt will return to a shelter.  Wilton Major, OT  11/12/2019

## 2019-11-13 NOTE — PLAN OF CARE
11/12/19 0849   Discharge Assessment   Assessment Type Discharge Planning Assessment   Confirmed/corrected address and phone number on facesheet? Yes   Assessment information obtained from? Patient;Medical Record   Expected Length of Stay (days) 3   Communicated expected length of stay with patient/caregiver yes   Prior to hospitilization cognitive status: Not Oriented to Time;Alert/Oriented   Prior to hospitalization functional status: Independent;Assistive Equipment   Current cognitive status: Alert/Oriented;Not Oriented to Time   Current Functional Status: Independent;Assistive Equipment   Lives With   (Lives in [facility] )   Able to Return to Prior Arrangements yes   Is patient able to care for self after discharge? No   Who are your caregiver(s) and their phone number(s)? Jermaine Retana sister [phone]   Patient's perception of discharge disposition nursing home   Readmission Within the Last 30 Days no previous admission in last 30 days   Patient currently being followed by outpatient case management? Unable to determine (comments)   Patient currently receives any other outside agency services? No   Do you have any problems affording any of your prescribed medications? No   Is the patient taking medications as prescribed? yes   Does the patient have transportation home?   (will need transportation, stretcher)   Dialysis Name and Scheduled days [facility]   Does the patient receive services at the [facility]? No   Discharge Plan A   ([facility])   Discharge Plan B   ([facility])   Patient/Family in Agreement with Plan yes

## 2019-11-13 NOTE — PLAN OF CARE
Mr. Mendes slept for most of the night. He had no complaints. He did refuse his night medicine and v/s at 12mn and 0400. This am he took his scheduled 0600 medicine. No further orders or complaints. Report was given to dialysis when they called. Will continue to monitor.

## 2019-11-13 NOTE — PLAN OF CARE
NATTY called patient's nurse Kaela to call report. Per Kaela, patient is in dialysis. Patient's nurse will call NATTY once the patient returns from dialysis to scheduled transportation to [facility]. NATTY provided patient's nurse with report number [phone]; Bed [address]A. NATTY is in communication with patient's CM and patient's Care Team - IM2. NATTY will continue to follow.     Nidhi Javier LMSW   - [facility]  Ext. [phone]

## 2019-11-13 NOTE — PLAN OF CARE
NATTY scheduled d/c transportation to [facility] through [facility]. Patient is scheduled to be picked up at 5:00 pm. NATTY provided patient's nurse with report number [phone]; Room 206 A. NATTY is in communication with patient's CM and patient's Care Team - IM2.        11/13/19 8069   Post-Acute Status   Post-Acute Authorization Placement   Post-Acute Placement Status Set-up Complete     Nidhi Javier LMSW   - [facility]  Ext. [phone]

## 2019-11-13 NOTE — ASSESSMENT & PLAN NOTE
-EGD 11/12 as above.  -Transfuse pRBC for Hb < 7 g/dL. Although, patient has been refusing scheduled labs. Counseled and encouraged that we would like to check his CBCs in particular to monitor Hgb. Most recent Hgb 11.1 on 11/12 PM.  -PO PPI 40 BID  -Avoid NSAIDs, antiplatelet agents and anticoagulants if possible in patients without absolute contraindications

## 2019-11-13 NOTE — ASSESSMENT & PLAN NOTE
Patient with ESRD with MWF HD, unsure of last dialysis as patient is not answering questions  Unlikely to have gotten 11/8 dialysis as he was in ED; underwent inpatient dialysis 11/9    Currently hypertensive on admission    Plan:  - inpatient HD MWF. Appreciate nephro assistance. Dialysis 11/13 AM before discharge.

## 2019-11-13 NOTE — ASSESSMENT & PLAN NOTE
-dietary management              -eating small, low-fat, low-fiber meals 4-5 times/day              -copious non carbonated fluid intake              -dietitian consulted  -Zofran PRN. Can consider metoclopramide if no evidence of ongoing GI hemorrhage as contraindicated in GI hemorrhage

## 2019-11-13 NOTE — DISCHARGE SUMMARY
[facility] Hospital Medicine  Discharge Summary      Patient Name: Vaughn Retana  MRN: [id number]  Admission Date: 11/8/2019  Hospital Length of Stay: 4 days  Discharge Date and Time:  11/13/2019 8:12 AM  Attending Physician: Kait Gongora MD   Discharging Provider: Nazia Washington MD  Primary Care Provider: [facility] Medicine Team: [facility] 2 Nazia Washington MD    HPI:   Mr. Retana is a 55 yo M with ESRD (MWF dialysis), L BKA 2/2 osteo, CHF (EF 60% 2017), multiple ICH without residual deficits, gastroparesis, esophagitis, and numerous episodes of hematemesis in the past months who presented to the ED from [facility] with c/o hematemesis. Patient did no cooperate with interview for ED or medicine physicians as well as with nursing staff. He will occasionally nod head or grunt in response and will only occasionally follow commands, however patient was noted to be complaining of pain in ED and has an emesis bag with appx 400cc of ground coffee ground emesis.     Of note, patient's most recent admission for the same problem was on 9/28 with numerous other visits before that date. Patient was transfused multiple units of blood on that admission and was discharged following treatment with protonix and sucralfate and after an EGD revealed a 3cm hiatal hernia with LA Grade D esophagitis. Of note, EGD from 9/23 demonstrated the same findings without any active GI bleed.    Procedure(s) (LRB):  EGD (ESOPHAGOGASTRODUODENOSCOPY) (N/A)      Hospital Course:   Patient admitted for workup of active GI bleed. Throughout the admission he has been monitored for hemodynamic status and has been stable since admit. However, he unfortunately has been refusing several scheduled labs. EGD and colonoscopy planned for 11/12. If prep not adequate, will just do EGD and plan for outpatient colonoscopy, as hematemesis was likely upper GI source.     11/12 EGD  performed:  Impression:  - LA Grade D reflux esophagitis, worrisome for underlying Mejia's esophagus.  - Small hiatal hernia.  - Erythematous mucosa in the stomach. Biopsied.  - One gastric polyp. Biopsied.  - Normal examined duodenum.    Recommendation:         - [facility].  - Resume previous diet.  - Use Protonix (pantoprazole) 40 mg orally every 12 hours.  - Await pathology results.  - Telephone GI clinic for pathology results in 7  days.  - Repeat upper endoscopy in 8 weeks to check healing (order has been placed).  - Will also need outpatient colon but we would like his esophagitis with associated nausea/emesis resolved first before scheduling for a colonoscopy in order to tolerate prep.  - Need outpatient GI follow up which we will work on scheduling.    Patient's Hgb has remained stable and he tolerated the EGD well. Stable for discharge back to NH on 11/13. Plan for follow up with GI as above for repeat EGD in 8 weeks.     Imaging Results          CT Head Without Contrast (Final result)  Result time 11/08/19 20:38:10    Final result by Gopal Laboy MD (11/08/19 20:38:10)                 Impression:      Motion limited examination.  No evidence of acute hemorrhage or major vascular distribution infarct.    Generalized cerebral volume loss and chronic microvascular ischemic changes.    Remote lacunar-type infarcts of the right basal ganglia and right thalamus.    Electronically signed by resident: Efraín Jefferson  Date:    11/08/2019  Time:    20:29    Electronically signed by: Gopal Laboy MD  Date:    11/08/2019  Time:    20:38             Narrative:    EXAMINATION:  CT HEAD WITHOUT CONTRAST    CLINICAL HISTORY:  Confusion/delirium, altered LOC, unexplained;    TECHNIQUE:  Low dose axial CT images obtained throughout the head without the use of intravenous contrast.  Axial, sagittal and coronal reconstructions were performed.  Examination markedly degraded by  patient motion artifact.    COMPARISON:  CT head 04/03/2018, 07/26/2017, 07/24/2017.    FINDINGS:  Prominence of the ventricles and sulci compatible with generalized cerebral volume loss.  No hydrocephalus.    Confluent periventricular white matter hypoattenuation suggestive of chronic microvascular ischemic changes.  Remote lacunar type infarcts of the right thalamus and right basal ganglia    No parenchymal mass, hemorrhage, edema or major vascular distribution infarct.    No extra-axial blood or fluid collections.    Skull/extracranial contents (limited evaluation):    No acute fracture.  Mastoid air cells and paranasal sinuses are essentially clear.                               X-Ray Chest 1 View (Final result)  Result time 11/08/19 18:10:34   Procedure changed from X-Ray Chest PA And Lateral     Final result by Jose Manuel Zheng MD (11/08/19 18:10:34)                 Impression:      1. No acute cardiopulmonary process appreciated.      Electronically signed by: Jose Manuel Zheng  Date:    11/08/2019  Time:    18:10             Narrative:    EXAMINATION:  XR CHEST 1 VIEW    CLINICAL HISTORY:  HEMOPTYSIS; Hemoptysis    TECHNIQUE:  Single PA view of the chest    COMPARISON:  Chest radiograph 09/02/2019    FINDINGS:  Cardiomediastinal silhouette is within normal limits.    No focal consolidation, overt interstitial edema, sizable pleural effusion or pneumothorax.    Mild multilevel degenerative changes of the imaged spine.                                    Consults:   Consults (From admission, onward)        Status Ordering Provider     Inpatient consult to Gastroenterology  Once     Provider:  (Not yet assigned)    Completed KIKO MON     Inpatient consult to Nephrology  Once     Provider:  (Not yet assigned)    Completed KIKO MON     Inpatient consult to PICC team (NIAS)  Once     Provider:  (Not yet assigned)    Completed ZOHREH TOWNSEND     Inpatient consult to Registered  Dietitian/Nutritionist  Once     Provider:  (Not yet assigned)    Completed LYNETTE DENTON          * Gastrointestinal hemorrhage with hematemesis  -EGD 11/12 as above.  -Transfuse pRBC for Hb < 7 g/dL. Although, patient has been refusing scheduled labs. Counseled and encouraged that we would like to check his CBCs in particular to monitor Hgb. Most recent Hgb 11.1 on 11/12 PM.  -PO PPI 40 BID  -Avoid NSAIDs, antiplatelet agents and anticoagulants if possible in patients without absolute contraindications      Gastroparesis  -dietary management              -eating small, low-fat, low-fiber meals 4-5 times/day              -copious non carbonated fluid intake              -dietitian consulted  -Zofran PRN. Can consider metoclopramide if no evidence of ongoing GI hemorrhage as contraindicated in GI hemorrhage    Personal history of latent tuberculosis infection  -No respiratory symptoms this admission  -PPD negative one month ago  -CXR without acute findings; no   -prior AFB cultures (including 12/2018) negative.   -Previously referred to Infectious disease to discuss latent TB prophylaxis in 2015, though lost to follow up.   -No current indication for negative isolation or further testing; Can consider outpatient referral to ID on discharge.     Erosive gastritis  See chronic upper GI bleeding      ESRD on hemodialysis  Patient with ESRD with MWF HD, unsure of last dialysis as patient is not answering questions  Unlikely to have gotten 11/8 dialysis as he was in ED; underwent inpatient dialysis 11/9    Currently hypertensive on admission    Plan:  - inpatient HD MWF. Appreciate nephro assistance. Dialysis 11/13 AM before discharge.      Final Active Diagnoses:    Diagnosis Date Noted POA    PRINCIPAL PROBLEM:  Gastrointestinal hemorrhage with hematemesis [K92.0] 08/25/2019 Yes    Gastroparesis [K31.84] 09/20/2019 Yes    GERD with esophagitis [K21.0]  Yes    Acute blood loss anemia [D62]  Yes    Personal  history of latent tuberculosis infection [Z86.15] 12/05/2018 Yes    Erosive gastritis [K29.60] 05/23/2018 Yes    Renovascular hypertension [I15.0] 03/16/2018 Yes     Chronic    Chronic kidney disease-mineral and bone disorder [N18.9, E83.9, M89.9] 07/28/2017 Yes     Chronic    Pulmonary hypertension associated with end-stage renal disease (ESRD) on dialysis [I27.29, N18.6, Z99.2] 03/23/2017 Not Applicable     Chronic    Chronic diastolic heart failure [I50.32]  Yes     Chronic    History of left below knee amputation 12/18/13 [Z89.512] 12/19/2013 Not Applicable     Chronic    Peripheral vascular disease in diabetes mellitus [E11.51] 07/18/2013 Yes     Chronic    ESRD on hemodialysis [N18.6, Z99.2] 02/07/2013 Not Applicable     Chronic      Problems Resolved During this Admission:    Diagnosis Date Noted Date Resolved POA    Upper GI bleed [K92.2] 11/09/2019 11/12/2019 Yes    Elevated troponin [R79.89] 03/07/2018 11/12/2019 Yes       Discharged Condition: stable    Disposition:     Follow Up:  Follow-up Information     [facility] - Gastro[facility]ology. Call in 2 weeks.    Specialty:  Gastroenterology  Why:  to get biopsy results.  Contact information:  [facility]  [facility] [address] [phone]  Additional information:  [facility]   [facility] - [facility] 1[facility]           [facility] - Gastroenterology. Go in 8 weeks.    Specialty:  Gastroenterology  Why:  repeat EGD procedure  Contact information:  [facility]  [facility] [address] [phone]  Additional information:  [facility]   [facility] - [facility] 1[facility]               Patient Instructions:   No discharge procedures on file.    Significant Diagnostic Studies: Labs:   CMP   Recent Labs   Lab 11/12/19  0647      K 4.4      CO2 24   GLU 73   BUN 25*   CREATININE 5.9*   CALCIUM 9.2   PROT 8.3   ALBUMIN 2.8*   BILITOT 0.5   ALKPHOS 231*   AST 18   ALT 10   ANIONGAP 12   ESTGFRAFRICA  11.4*   EGFRNONAA 9.9*    and CBC   Recent Labs   Lab 11/11/19  1731 11/12/19  0647 11/12/19  2031   WBC 12.41 6.18 5.79   HGB 12.4* 11.5* 11.1*  11.1*   HCT 38.8* 37.2* 34.4*    178 174       Pending Diagnostic Studies:     Procedure Component Value Units Date/Time    CBC auto differential [[id number]]     Order Status:  Sent Lab Status:  No result     Specimen:  Blood     Comprehensive Metabolic Panel (CMP) [[id number]]     Order Status:  Sent Lab Status:  No result     Specimen:  Blood     Magnesium [[id number]]     Order Status:  Sent Lab Status:  No result     Specimen:  Blood     Phosphorus [[id number]]     Order Status:  Sent Lab Status:  No result     Specimen:  Blood     Specimen to Pathology, Surgery Gastrointestinal tract [[id number]] Collected:  11/12/19 1158    Order Status:  Sent Lab Status:  In process Updated:  11/12/19 1159         Medications:  Reconciled Home Medications:      Medication List      START taking these medications    pantoprazole 40 MG tablet  Commonly known as:  PROTONIX  Take 1 tablet (40 mg total) by mouth 2 (two) times daily.        CONTINUE taking these medications    acetaminophen 325 MG tablet  Commonly known as:  TYLENOL  Take 2 tablets (650 mg total) by mouth every 8 (eight) hours as needed.     CARAFATE 100 mg/mL suspension  Generic drug:  sucralfate  Take 10 mLs (1 g total) by mouth 4 (four) times daily before meals and nightly.     carvedilol 3.125 MG tablet  Commonly known as:  COREG  Take 1 tablet (3.125 mg total) by mouth 2 (two) times daily with meals.     metoclopramide HCl 10 MG tablet  Commonly known as:  REGLAN  Take 1 tablet (10 mg total) by mouth 3 (three) times daily before meals.     midodrine 5 MG Tab  Commonly known as:  PROAMATINE  Please take 30 min before dialysis on Monday Wednesday and Friday     ondansetron 8 MG tablet  Commonly known as:  ZOFRAN  Take 1 tablet (8 mg total) by mouth every 12 (twelve) hours as needed for Nausea.     RENAPLEX-D 800  mcg-12.5 mg -2,000 unit Tab  Generic drug:  vit B,C-FA-zinc-selen-vit D3-E  Take 1 tablet by mouth once daily.     sevelamer carbonate 800 mg Tab  Commonly known as:  RENVELA  Take 1 tablet (800 mg total) by mouth 3 (three) times daily with meals.            Indwelling Lines/Drains at time of discharge:   Lines/Drains/Airways     Drain                 Hemodialysis AV Fistula Right upper arm -- days         Hemodialysis AV Fistula Right upper arm -- days                Time spent on the discharge of patient: 45 minutes  Patient was seen and examined on the date of discharge and determined to be suitable for discharge.         Nazia Washington MD  Department of Hospital Medicine  [facility]

## 2019-11-13 NOTE — ASSESSMENT & PLAN NOTE
-No respiratory symptoms this admission  -PPD negative one month ago  -CXR without acute findings; no   -prior AFB cultures (including 12/2018) negative.   -Previously referred to Infectious disease to discuss latent TB prophylaxis in 2015, though lost to follow up.   -No current indication for negative isolation or further testing; Can consider outpatient referral to ID on discharge.

## 2019-11-13 NOTE — ANESTHESIA POSTPROCEDURE EVALUATION
Anesthesia Post Evaluation    Patient: Vaughn Retana    Procedure(s) Performed: Procedure(s) (LRB):  EGD (ESOPHAGOGASTRODUODENOSCOPY) (N/A)    Final Anesthesia Type: general  Patient location during evaluation: floor  Patient participation: Yes- Able to Participate  Level of consciousness: awake and alert  Post-procedure vital signs: reviewed and stable  Pain management: adequate  Airway patency: patent  PONV status at discharge: No PONV  Anesthetic complications: no      Cardiovascular status: hemodynamically stable  Respiratory status: unassisted, spontaneous ventilation and room air  Hydration status: euvolemic  Follow-up not needed.          Vitals Value Taken Time   /76 11/12/2019  8:00 PM   Temp 36.4 °C (97.5 °F) 11/12/2019  8:00 PM   Pulse 89 11/12/2019  8:00 PM   Resp 20 11/12/2019  8:00 PM   SpO2 100 % 11/13/2019  5:54 AM         Event Time     Out of Recovery 12:47:13          Pain/Haseeb Score: Haseeb Score: 10 (11/12/2019 12:30 PM)

## 2019-11-13 NOTE — PROGRESS NOTES
Pt dialysis completed, 1 Liter removed via RIJ, blood rinsed back and needles removed with pressure drsg applied.positive thrill and bruit noted.Transported via bed back to room, 1108 by hosp transport

## 2019-11-13 NOTE — PROGRESS NOTES
ESRD MWF, dialysis started to R upper fistula with 15 gauge needles, secured with tape.  Pt tolerated well

## 2019-11-14 NOTE — NURSING
Report called to nurse Najera at [facility]. IV discontinued. Patient without complaints Patient discharged with belongings and transported by wheelchair van to [facility].

## 2019-11-22 NOTE — PROGRESS NOTES
[facility]                                        Long Term Care                                   Return to facility after hospitalization    Admit Date: 10/8/19  Principal Problem:  Debility r/t multiple co morbidities, noncompliance, recent GI bleed  HPI obtained from patient interview and chart review     Visit Date: 11/18/2019    Chief Complaint: return to facility s/p hospitalization for GI bleed    HPI:  Mr. Retana is a 53 y/o male with a pertinent PMHx of Chronic diastolic heart failure, multiple intracranial hemorrhages with left hemiparesis, end-stage renal on HD with secondary hyperparathyroidism, GERD, hep C, HTN, left BKA 2/2 osteomyelitis, latent TB,, pulmonary hypertension, noncompliance. He presented to the ED with primary complaint of hematemesis. The patient has had multiple admits for the same complaint over the last month, with multiple EGDs and blood transfusions, dx with esophagitis and discharged on reglan, carafate, and a PPI due to gastroparesis. Patient was scoped again during admit, no new findings, required more transfusions. Patient needed long term placement at NH. Patient to follow up in the GI clinic for f/u EGD in 8-12 weeks. Admitted to [facility] for group home care. Lab work reviewed during initial visit on admit to facility, Albumin low at 1.9, likely needs replacement with supplementation due to HD patient.     Interval history:  Patient seen today after return to facility after recent admit for GI bleed workup, he underwent EGD which showed severe reflux esophagitis and erythematous mucosa in his stomach.  GI recommended b.i.d. PPI.  His hemoglobin was trended and remained stable post EGD.  Repeat upper endoscopy in 8 weeks to check healing (order has been placed). Will also need outpatient colon but we would like his esophagitis with associated nausea/emesis resolved first before  scheduling for a colonoscopy in order to tolerate prep.  Patient still complaining of nausea, this seems to be his baseline, follow-up EGD scheduled in a few months per GI recs. Continue with same orders.      Past Medical History: Patient has a past medical history of Amputation stump pain (9/10/2013), Aspiration pneumonia (7/27/2015), Asterixis (11/8/2016), C. 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    Past Surgical History: Patient has a past surgical history that includes R AVF (9/12/12); Leg amputation through knee (12/18/2013); Foot amputation through metatarsal; Colonoscopy; Colonoscopy (N/A, 4/4/2017); Esophagogastroduodenoscopy (N/A, 6/12/2018); Upper gastrointestinal endoscopy; Esophagogastroduodenoscopy (N/A, 3/7/2019); Esophagogastroduodenoscopy (N/A, 9/23/2019); Esophagogastroduodenoscopy (N/A, 10/2/2019); and Esophagogastroduodenoscopy (N/A, 11/12/2019).    Social History: Patient reports that he has quit smoking. He has a 10.00 pack-year smoking history. He has never used smokeless tobacco. He reports that  he does not drink alcohol or use drugs.    Family History: family history includes Alcohol abuse in his maternal grandmother; Diabetes in his brother and maternal grandfather; Early death in his mother; Heart disease in his father; Hyperlipidemia in his father; Hypertension in his father and sister; Kidney disease in his father.    Allergies: Patient is allergic to fosrenol [lanthanum].    ROS  Constitutional: Negative for fever or fatigue.   Eyes: Negative for blurred vision, double vision and discharge.   Respiratory: Negative for cough, shortness of breath and wheezing.    Cardiovascular: Negative for chest pain, palpitations, and leg swelling.   Gastrointestinal: Negative for abdominal pain, constipation, diarrhea, nausea and vomiting.   Genitourinary: Negative for dysuria, frequency and urgency.   Musculoskeletal:  + generalized weakness. Negative for back pain and myalgias.   Skin: Negative for itching and rash, L BKA,  RICHARD AV fistula.   Neurological: Negative for dizziness, speech change, and headaches.   Psychiatric/Behavioral: Negative for depression. The patient is not nervous/anxious.      PEx    Constitutional: Patient appears somewhat malnourished, thin,  and in no distress   Head: Normocephalic and atraumatic.   Eyes: Pupils are equal, round, and reactive to light.   Neck: Normal range of motion. Neck supple.   Cardiovascular: Normal rate, regular rhythm and normal heart sounds.    Pulmonary/Chest: Effort normal and breath sounds are clear  Abdominal: Soft. Bowel sounds are normal.   Musculoskeletal: Normal range of motion.   Neurological: Alert and oriented to person, place, and not to time.   Psychiatric: Normal mood and affect. Behavior is somewhat withdrawn, slow to answer questions during assessment   Skin: Skin is warm and dry. Full skin assessment completed during visit, L LUDY noted,  RICHARD AV fistula       Assessment and Plan:    Aftercare of Gastrointestinal hemorrhage with hematemesis,  improving  Anemia in chronic kidney disease r/t ESRD, ongoing  GERD with esophagitis, ongoing  -gastritis vs PUD vs esophagitis, vs MWT (in setting of vomiting)>multiple prior EGDs.   -Previous EGD 9/23, LA Grade D reflux esophagitis. Medium-sized hiatal hernia. No active GI bleed> received PRBC transfusions during recent stay  -Continue Protonix 40mg BID + Carafate 0.5g 4 times daily + Reglan 10 mg PO TID before meals, per GI recommendations until his outpatient follow up appointment  -Continue zofran PRN.   -CBC, CMP Q month x 3 months ordered  -11/18  continue current orders      Gastroparesis, ongoing  -Previous NM gastric emptying study was done > suggestive of gastroparesis retaining 50% of his gastric contents 4 hours after meals> started PPI 40 mg BID, Reglan TID before meals, and Carafate BID   - changed reglan to PRN during admit, as patient denies any nausea/ vomiting    HTN with chronic diastolic heart failure, ongoing  ESRD on hemodialysis, ongoing  Chronic kidney disease-mineral and bone disorder, ongoing  Hypoalbuminemia, ongoing  -Dialysis M/W/F via RICHARD AV fistula  -Continue Midodrine  On MWF prior to HD  -Continue renvela TID + renal diet  -Continue Coreg 3.125 mg PO BID  -10/11 initiate start of ProStat 30 mL p.o. B.i.d., initiate renal MVI QD (previously on Renaplex which is not available at facility)  11/18 continue current regimen    Remote History of Intracerebral Hemorrhage, multiple, 5/2013; 9/2016; 11/2016; 2/2017  -Not currently on DAPT, or AC given h/o ICH/GIB.     Previous Left BKA 2/2 osteomyelitis  -continue prosthetic          Future Appointments   Date Time Provider Department Center   12/2/2019  3:00 PM Opal Mckeon PA-C [facility]        Recent Lab work 10/2019: H&H 8/27, Albumin 1.9, K+ 4.2, Phos 2.6.         Total time of the visit 67 minutes  Non physical exam/ non charting time:  42 minutes   Start Time:  2100  Stop Time: 2107  Description of non physical  exam/non charting time: counseling patient on clinical conditions and therapies provided regardingreview of impatient orders, pertinent testing, provider notes.  Extensive chart review completed including all consultation notes.  All pertinent laboratory and radiographical images reviewed.        Kimberly Hernandez NP          Patient note was created using MModal Dictation.  Any errors in syntax or even information may not have been identified and edited on initial review prior to signing this note.

## 2019-11-22 NOTE — ED PROVIDER NOTES
Encounter Date: 11/22/2019       History     Chief Complaint   Patient presents with    Vomiting     one episode of vomiting at nursing home. Did not want to take nausea meds      54 y.o male with h/o ESRD (MWF); LBKA 2.2 osteomyelitis; HFpEF, multiple ICH w/o residual deficit, gastroparesis, severe reflux esophagitis who presents to the ED with cc hematemesis.  Pt states this afternoon after eating dinner he started vomiting. There were streaks of blood in emesis. Has vomitted approximately 4 times since. No associated lightheadedness, palpitation, CP/ chest discomfort, abdominal pain, melena, hematochezia, hematuria. Also denies recent fever, chills, cough, SOB.  Of note pt was admitted on 11/8 for hematemesis. EGD was done that revealed severe reflux esophagitis. Was discharged with BID PPI and recommendation to follow up with GI for repeat endoscopy. Pt also had multiple other admissions previous including on 9/28. Was transfused with pRBC during that admission. EGD at that time revealed 3 cm hiatal hernia and grade D esophagitis.     Pt denies smoking. Denies drinking alcohol and denies illicit drug use. [facility].    The history is provided by the patient.          Review of patient's allergies indicates:   Allergen Reactions    Fosrenol [lanthanum] Nausea And Vomiting     Nausea and vomiting     Past Medical History:   Diagnosis Date    Amputation stump pain 9/10/2013    Aspiration pneumonia 7/27/2015    Asterixis 11/8/2016    C. difficile colitis 8/7/2015    Cholelithiasis without obstruction 8/25/2015    Chronic diastolic heart failure     2-23-17   1 - Low normal to mildly depressed left ventricular systolic function (EF 50-55%).    2 - Right ventricular enlargement with mildly depressed systolic function.    3 - Left ventricular diastolic dysfunction.    4 - Right atrial enlargement.    5 - Severe tricuspid regurgitation.    6 - Pulmonary hypertension. The estimated PA  systolic pressure is 86 mmHg.    7 - Increased central venous pressure.    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    Metabolic acidosis, IAG, reduced excretion of inorganic acids     Myoclonic jerking 9/20/2016    Noncompliance with medication regimen 12/4/2018    Secondary hyperparathyroidism (of renal origin)     Secondary pulmonary hypertension 3/23/2017    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    TB lung, latent 08/25/2015    Negative Quantiferon Gold 3-23-17     Past Surgical History:   Procedure Laterality Date    COLONOSCOPY      COLONOSCOPY N/A 4/4/2017    Procedure: COLONOSCOPY;  Surgeon: Walker Stern MD;  Location: [facility] ([address] [place] [address]);  Service: Endoscopy;  Laterality: N/A;  PA Systolic Pressure 85.56. HD Patient MWF, K+ lab prior to procedure.     ESOPHAGOGASTRODUODENOSCOPY N/A 6/12/2018    Procedure: EGD (ESOPHAGOGASTRODUODENOSCOPY);  Surgeon: Man Galicia MD;  [facility]  Service: Endoscopy;  Laterality: N/A;  EGD in 8-12 weeks with Dr. Galicia on 4th floor for follow up erosive esophagitis and Mejia's surveillance.    Patient should be on Pantoprazole 40mg every 12 hours or the equivulant of another PPI    awaiting for patient to reply back regarding changing    ESOPHAGOGASTRODUODENOSCOPY N/A 3/7/2019    Procedure: EGD (ESOPHAGOGASTRODUODENOSCOPY);  Surgeon: Man Galicia MD;   Location: [facility] ([facility]);  Service: Endoscopy;  Laterality: N/A;    ESOPHAGOGASTRODUODENOSCOPY N/A 9/23/2019    Procedure: EGD (ESOPHAGOGASTRODUODENOSCOPY);  Surgeon: Keanu Rainey MD;  Location: [address] FLR);  Service: Endoscopy;  Laterality: N/A;    ESOPHAGOGASTRODUODENOSCOPY N/A 10/2/2019    Procedure: EGD (ESOPHAGOGASTRODUODENOSCOPY);  Surgeon: Kevin De La Paz MD;  Location: [address] FLR);  Service: Endoscopy;  Laterality: N/A;    ESOPHAGOGASTRODUODENOSCOPY N/A 11/12/2019    Procedure: EGD (ESOPHAGOGASTRODUODENOSCOPY);  Surgeon: Kevin De La Paz MD;  [address]  Service: Endoscopy;  Laterality: N/A;    FOOT AMPUTATION THROUGH METATARSAL      left foot    LEG AMPUTATION THROUGH KNEE  12/18/2013    left BKA    R AVF  9/12/12    UPPER GASTROINTESTINAL ENDOSCOPY       Family History   Problem Relation Age of Onset    Early death Mother     Kidney disease Father     Hypertension Father     Heart disease Father     Hyperlipidemia Father     Diabetes Brother     Alcohol abuse Maternal Grandmother     Diabetes Maternal Grandfather     Hypertension Sister     Melanoma Neg Hx      Social History     Tobacco Use    Smoking status: Former Smoker     Packs/day: 1.00     Years: 10.00     Pack years: 10.00    Smokeless tobacco: Never Used   Substance Use Topics    Alcohol use: No    Drug use: No     Review of Systems   Constitutional: Negative for chills, fatigue and fever.   HENT: Negative for sore throat.    Respiratory: Negative for shortness of breath.    Cardiovascular: Negative for chest pain and palpitations.   Gastrointestinal: Positive for nausea and vomiting. Negative for abdominal pain and blood in stool.   Genitourinary: Negative for dysuria.   Musculoskeletal: Negative for back pain.   Skin: Negative for pallor and rash.   Neurological: Negative for dizziness and weakness.   Hematological: Does not bruise/bleed easily.   Psychiatric/Behavioral:  Negative for behavioral problems.   All other systems reviewed and are negative.      Physical Exam     Initial Vitals [11/22/19 0014]   BP Pulse Resp Temp SpO2   (!) 190/111 95 18 98 °F (36.7 °C) 100 %      MAP       --         Physical Exam    Nursing note and vitals reviewed.  Constitutional: He appears well-nourished. No distress.   54 y.o AA male laying comfortably in hospital bed with occasional dry heaves   HENT:   Head: Normocephalic and atraumatic.   Eyes: Conjunctivae and EOM are normal. No scleral icterus.   Neck: Normal range of motion. Neck supple. No JVD present.   Cardiovascular: Normal rate and regular rhythm.   Murmur heard.  Pulmonary/Chest: He has no wheezes. He has no rales.   Abdominal: Soft. Bowel sounds are normal. He exhibits no distension. There is no tenderness. There is no rebound and no guarding.   Musculoskeletal: Normal range of motion.   L BKA with prosthesis in place   Neurological: He is alert and oriented to person, place, and time.   Skin: Skin is warm and dry. Capillary refill takes less than 2 seconds.   Psychiatric: He has a normal mood and affect.         ED Course   Procedures  Labs Reviewed   CBC W/ AUTO DIFFERENTIAL - Abnormal; Notable for the following components:       Result Value    RBC 3.79 (*)     Hemoglobin 11.8 (*)     Hematocrit 35.1 (*)     Mean Corpuscular Hemoglobin 31.1 (*)     RDW 14.9 (*)     Immature Granulocytes 0.8 (*)     Immature Grans (Abs) 0.06 (*)     Eos # 0.6 (*)     Eosinophil% 8.6 (*)     Platelet Estimate Clumped (*)     All other components within normal limits   COMPREHENSIVE METABOLIC PANEL - Abnormal; Notable for the following components:    BUN, Bld 29 (*)     Creatinine 6.9 (*)     Calcium 7.9 (*)     Albumin 2.9 (*)     Alkaline Phosphatase 302 (*)     eGFR if  9.4 (*)     eGFR if non  8.2 (*)     All other components within normal limits   PHOSPHORUS - Abnormal; Notable for the following components:     Phosphorus 2.4 (*)     All other components within normal limits   LIPASE - Abnormal; Notable for the following components:    Lipase 87 (*)     All other components within normal limits   CBC W/ AUTO DIFFERENTIAL - Abnormal; Notable for the following components:    RBC 3.77 (*)     Hemoglobin 11.2 (*)     Hematocrit 36.0 (*)     Mean Corpuscular Hemoglobin Conc 31.1 (*)     RDW 14.8 (*)     Eos # 0.7 (*)     Lymph% 16.4 (*)     All other components within normal limits   MAGNESIUM   PROTIME-INR   TYPE & SCREEN        ECG Results          EKG 12-lead (In process)  Result time 11/22/19 07:31:09    In process by Interface, Lab In [facility] (11/22/19 07:31:09)                 Narrative:    Test Reason : R94.31,    Vent. Rate : 098 BPM     Atrial Rate : 098 BPM     P-R Int : 152 ms          QRS Dur : 094 ms      QT Int : 396 ms       P-R-T Axes : 079 012 065 degrees     QTc Int : 505 ms    Normal sinus rhythm  Possible Anteroseptal infarct (cited on or before 22-NOV-2019)  Prolonged QT  Abnormal ECG  When compared with ECG of 08-NOV-2019 15:35,  Questionable change in initial forces of Septal leads  Nonspecific T wave abnormality no longer evident in Inferior leads  T wave amplitude has increased in Anterior leads    Referred By: [email or web address]   SELF           Confirmed By:                             Imaging Results    None          Medical Decision Making:   History:   Old Medical Records: I decided to obtain old medical records.  Initial Assessment:   Vaughn Retana is a 54 y.o. male with PMH of ESRD, severe reflux esophagitis, gastroparesis presents w hematemesis. Pt had a recent admission at [facility] on 11/8 where EGD was done and revealed severe reflux esophagitis. Pt was discharged with PPI BID along with recommendation to follow up with GI for repeat EGD.    Will obtain CBC, CMP, mag, phos, lipase, and INR. Will administer zofran if EKG does not reveal prolongation of QT.     Clinical presentation seems consistent w  esophagitis.   Clinical Tests:   Lab Tests: Ordered and Reviewed  Radiological Study: Ordered and Reviewed  Medical Tests: Ordered and Reviewed  Other:   I have discussed this case with another health care provider.              Attending Attestation:   Physician Attestation Statement for Resident:  As the supervising MD   Physician Attestation Statement: I have personally seen and examined this patient.   I agree with the above history. -:   As the supervising MD I agree with the above PE.    As the supervising MD I agree with the above treatment, course, plan, and disposition.            Attending ED Notes:   54-year-old gentleman with multiple medical comorbidities presents with vomiting from his nursing facility.  He has history of gastritis and hematemesis.  Has having a few episodes of vomiting with blood streaks.  No large volumes of blood.  On evaluation the patient is not vomiting, but spitting.  He states he is hungry and wants to eat.  Blood pressure noted to be slightly elevated.  Lab work reviewed.  Hemoglobin and platelet count stable. Patient ate an entire sandwich in from me.  Indication for further admission.  He is on appropriate medical management at the nursing facility and has a scheduled endoscopy upcoming.                        Clinical Impression:       ICD-10-CM ICD-9-CM   1. Nausea vomiting and diarrhea R11.2 787.91    R19.7 787.01   2. Prolonged Q-T interval on ECG R94.31 794.31   3. Hypertension, unspecified type I10 401.9         Disposition:   Disposition: Discharged  Condition: Stable                     Corina Jin MD  11/22/19 [id number]

## 2019-11-22 NOTE — ED TRIAGE NOTES
Pt came from [facility] for 1 episode of vomiting. Patient reports his PCP told him if he vomits to come to the ED to be seen. Patient refused nausea meds at [facility] home. Hx of HTN and acid reflux. CBG 68, sinus on the monitor per monitor per EMS. Patient denies abdominal pain and nausea.

## 2019-11-25 NOTE — TELEPHONE ENCOUNTER
Spoke to Maximiliano Retana and schedule pt's f/u appt with Christian on 01/02/19 at 2PM. Will mail appt letter.

## 2019-11-25 NOTE — TELEPHONE ENCOUNTER
----- Message from Rodri Sheets MD sent at 11/13/2019  6:38 AM CST -----  David Joseph    How are you?    Dr. De La Paz and I scoped this patient yesterday and Brain wanted him to be seen in clinic for continuity due to issues with non-compliance.    Essentially his story is:  Came in with hematemesis, EGD still showed LA Grade D esophagitis with no signs of active bleeding and likely underlying Mejia's. We recommended PPI PO BID and repeat EGD in 8 weeks.     When we did EGD he was actually scheduled for a double but didn't prep appropriately. When I ordered the EGD I asked Brain if he wanted me to add the colonoscopy however he said he wanted to focus on his esophagitis/nausea/emesis first.    During the clinic visit I would make sure that he is complaint with PPI and check in on his symptoms. I would also make sure to check that he is scheduled for EGD. Whether you add the colon to the EGD or just let him get colonoscopy 04/2020 (which is when he is due based on last colon recs) I think depends on how well his nausea/emesis is and how well he can tolerate a prep.    YA

## 2019-12-18 NOTE — PROGRESS NOTES
[facility]                                                    Progress Note      Visit Date: 12/18/2019      Chief Complaint: BP/HR monitoring, re evaluation of nausea    HPI:  Patient seen today to monitor conditions. /79, HR 77, stable on current regimen.  Most recent weight 133 lb, will trend.  Patient continuing HD, no new complaints today.  He continues to be withdrawn in his behavior. Patient does have intermittent nausea, he also intermittently refuses medications at times. His next follow up EGD is scheduled for 1/2020 with GI, will await recommendations after testing completed. No changes needed to current plan of care. Will continue to monitor and treat all chronic conditions.     Past Medical History: Patient has a past medical history of Amputation stump pain (9/10/2013), Aspiration pneumonia (7/27/2015), Asterixis (11/8/2016), C. 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    Past Surgical History: Patient has a past surgical history that includes R AVF (9/12/12); Leg amputation through knee (12/18/2013); Foot amputation through metatarsal; Colonoscopy; Colonoscopy (N/A, 4/4/2017); Esophagogastroduodenoscopy (N/A, 6/12/2018); Upper gastrointestinal endoscopy; Esophagogastroduodenoscopy (N/A, 3/7/2019); Esophagogastroduodenoscopy (N/A, 9/23/2019); Esophagogastroduodenoscopy (N/A, 10/2/2019); and Esophagogastroduodenoscopy (N/A, 11/12/2019).    Social History: Patient reports that he has quit smoking. He has a 10.00 pack-year smoking history. He has never used smokeless tobacco. He reports that he does not drink alcohol or use drugs.    Family History: family history includes Alcohol abuse in his maternal grandmother; Diabetes in his brother and maternal grandfather; Early death in his mother; Heart disease in his father; Hyperlipidemia in his father; Hypertension in his father and sister; Kidney disease in his father.    Allergies: Patient is allergic to fosrenol [lanthanum].    ROS  Constitutional: Negative for fever or fatigue.   Eyes: Negative for blurred vision, double vision and discharge.   Respiratory: Negative for cough, shortness of breath and wheezing.    Cardiovascular: Negative for chest pain, palpitations, and leg swelling.   Gastrointestinal: Negative for abdominal pain, constipation, diarrhea, intermittent nausea and vomiting.   Genitourinary: Negative for dysuria, frequency and urgency.   Musculoskeletal:  + generalized weakness. Negative for back pain and myalgias.   Skin: Negative for itching and rash, L BKA,  RICHARD AV fistula.   Neurological: Negative for dizziness, speech change, and headaches.   Psychiatric/Behavioral: Negative for depression. The patient is not nervous/anxious.      PEx    Constitutional: Patient appears somewhat malnourished, thin,  and in no distress   Head: Normocephalic and atraumatic.   Eyes: Pupils are equal, round, and reactive  to light.   Neck: Normal range of motion. Neck supple.   Cardiovascular: Normal rate, regular rhythm and normal heart sounds.    Pulmonary/Chest: Effort normal and breath sounds are clear  Abdominal: Soft. Bowel sounds are normal.   Musculoskeletal: Normal range of motion.   Neurological: Alert and oriented to person, place, and not to time.   Psychiatric: Normal mood and affect. Behavior is somewhat withdrawn, slow to answer questions during assessment   Skin: Skin is warm and dry, L BKA noted,  RICHARD AV fistula       Assessment and Plan:    Aftercare of Gastrointestinal hemorrhage with hematemesis, improving  Anemia in chronic kidney disease r/t ESRD, ongoing  GERD with esophagitis, ongoing  -gastritis vs PUD vs esophagitis, vs MWT (in setting of vomiting)>multiple prior EGDs.   -Previous EGD 9/23, LA Grade D reflux esophagitis. Medium-sized hiatal hernia. No active GI bleed> received PRBC transfusions during recent stay  -Continue Protonix 40mg BID + Carafate 0.5g 4 times daily + Reglan 10 mg PO TID before meals, per GI recommendations until his outpatient follow up appointment  -Continue zofran PRN.   -CBC, CMP Q month x 3 months ordered  -recent ED visit 11/2019 for N/V-no changes to plan of care ordered> next EGD scheduled for 1/2020  -12/18  continue current orders      Gastroparesis, ongoing  -Previous NM gastric emptying study was done > suggestive of gastroparesis retaining 50% of his gastric contents 4 hours after meals> started PPI 40 mg BID, Reglan TID before meals, and Carafate BID   - changed reglan to PRN during admit, as patient denies any nausea/ vomiting  -12/18 continue current regimen    HTN with chronic diastolic heart failure, ongoing  ESRD on hemodialysis, ongoing  Chronic kidney disease-mineral and bone disorder, ongoing  Hypoalbuminemia, ongoing  -Dialysis M/W/F via RICHARD AV fistula  -Continue Midodrine  On MWF prior to HD  -Continue renvela TID + renal diet  -Continue Coreg 3.125 mg PO  BID  -10/11 initiate start of ProStat 30 mL p.o. B.i.d., initiate renal MVI QD (previously on Renaplex which is not available at facility)  -12/18 continue current regimen    Remote History of Intracerebral Hemorrhage, multiple, 5/2013; 9/2016; 11/2016; 2/2017  -Not currently on DAPT, or AC given h/o ICH/GIB.     Previous Left BKA 2/2 osteomyelitis  -continue prosthetic          No future appointments.       Recent Lab work 10/2019: H&H 8/27, Albumin 1.9, K+ 4.2, Phos 2.6.           Kimberly Hernandez NP          Patient note was created using [facility] Dictation.  Any errors in syntax or even information may not have been identified and edited on initial review prior to signing this note.

## 2019-12-23 NOTE — TELEPHONE ENCOUNTER
----- Message from Rodri Sheets MD sent at 2019  3:52 PM CST -----  Hi Phyllicia    Please give this patient a call and let him know/remind him of the followin) His biopsies from the scope on  were all benign    2) He needs to be on Protonix 40 mg orally every 12 hours (he has a script with refills but if there are any issues please let me know)    3) He needs to schedule follow up with GI clinic, it can be with an NP, he had an appointment with [facility] on 2020 which was canceled so we need to make sure a new one is made    4) He needs to schedule a follow up EGD for the  week of January to make sure his esophagitis is better. Please give him the schedulers number.    Let me know if you have any questions, ill be around all next week  YA  ----- Message -----  From: Kevin De La Paz MD  Sent: 2019   1:17 PM CST  To: MD Dr. Sudeep Cabrera please tell Vaughn that his EGD pathology was benign remind him about the follow-up in several weeks to check his erosive esophagitis healing please make sure he is taking his proton pump inhibitor as directed.    1. Stomach, lesser curve polyp (biopsy):  - Hyperplastic polyp with mild inflammation  - Xanthoma cells  - Helicobacter pylori stain is negative. The controls reacted appropriately.  2. Stomach (biopsy):  - Oxyntic mucosa with mild nonspecific reactive changes  - Negative for Helicobacter pylori organisms on H&E stain

## 2019-12-26 NOTE — TELEPHONE ENCOUNTER
Pt's sister Alicia answered the phone and stated pt is currently at [facility]. There is nothing on file stating it's okay to give her results. Please advise.

## 2019-12-26 NOTE — TELEPHONE ENCOUNTER
Spoke with Nazia and she stated since pt's sister is not on file to speak with about results I would need to contact pt's nurse since he is in their care. I contacte [facility] [phone] but nurse was unavailable. I will try to call again later.

## 2019-12-27 NOTE — TELEPHONE ENCOUNTER
Results were faxed to Nurse Benjamin at [facility] [phone]. Confirmation was received.

## 2019-12-27 NOTE — TELEPHONE ENCOUNTER
Spoke with Nurse Sourav and results were given, appointment was scheduled and schedulers number was given. She stated pt is taking the Protonix but he sometimes refuses it. Sourav would like the results faxed so she can have it on paper. Is it ok to fax the results from the biopsy?

## 2020-01-01 ENCOUNTER — ANESTHESIA EVENT (OUTPATIENT)
Dept: ENDOSCOPY | Facility: HOSPITAL | Age: 55
DRG: 870 | End: 2020-01-01
Payer: MEDICARE

## 2020-01-01 ENCOUNTER — DOCUMENTATION ONLY (OUTPATIENT)
Dept: HEPATOLOGY | Facility: HOSPITAL | Age: 55
End: 2020-01-01

## 2020-01-01 ENCOUNTER — HOSPITAL ENCOUNTER (INPATIENT)
Facility: HOSPITAL | Age: 55
LOS: 12 days | Discharge: HOME | DRG: 981 | End: 2020-05-04
Attending: EMERGENCY MEDICINE | Admitting: HOSPITALIST
Payer: MEDICARE

## 2020-01-01 ENCOUNTER — HOSPITAL ENCOUNTER (INPATIENT)
Facility: HOSPITAL | Age: 55
LOS: 6 days | DRG: 871 | End: 2020-08-11
Attending: EMERGENCY MEDICINE | Admitting: EMERGENCY MEDICINE
Payer: MEDICARE

## 2020-01-01 ENCOUNTER — PATIENT OUTREACH (OUTPATIENT)
Dept: ADMINISTRATIVE | Facility: HOSPITAL | Age: 55
End: 2020-01-01

## 2020-01-01 ENCOUNTER — TELEPHONE (OUTPATIENT)
Dept: ENDOSCOPY | Facility: HOSPITAL | Age: 55
End: 2020-01-01

## 2020-01-01 ENCOUNTER — OUTPATIENT CASE MANAGEMENT (OUTPATIENT)
Dept: ADMINISTRATIVE | Facility: OTHER | Age: 55
End: 2020-01-01

## 2020-01-01 ENCOUNTER — HOSPITAL ENCOUNTER (EMERGENCY)
Facility: HOSPITAL | Age: 55
Discharge: HOME | End: 2020-03-21
Attending: EMERGENCY MEDICINE
Payer: MEDICARE

## 2020-01-01 ENCOUNTER — HOSPITAL ENCOUNTER (EMERGENCY)
Facility: HOSPITAL | Age: 55
Discharge: HOME | End: 2020-03-17
Attending: EMERGENCY MEDICINE
Payer: MEDICARE

## 2020-01-01 ENCOUNTER — HOSPITAL ENCOUNTER (INPATIENT)
Facility: HOSPITAL | Age: 55
LOS: 29 days | Discharge: NURSING, REHAB OR LONG-TERM CARE | DRG: 870 | End: 2020-02-18
Attending: EMERGENCY MEDICINE | Admitting: EMERGENCY MEDICINE
Payer: MEDICARE

## 2020-01-01 ENCOUNTER — HOSPITAL ENCOUNTER (INPATIENT)
Facility: HOSPITAL | Age: 55
LOS: 11 days | DRG: 177 | End: 2020-04-15
Attending: EMERGENCY MEDICINE | Admitting: INTERNAL MEDICINE
Payer: MEDICARE

## 2020-01-01 ENCOUNTER — ANESTHESIA (OUTPATIENT)
Dept: ENDOSCOPY | Facility: HOSPITAL | Age: 55
DRG: 870 | End: 2020-01-01
Payer: MEDICARE

## 2020-01-01 ENCOUNTER — HOSPITAL ENCOUNTER (EMERGENCY)
Facility: HOSPITAL | Age: 55
Discharge: ANOTHER HOSPITAL | End: 2020-03-25
Attending: EMERGENCY MEDICINE
Payer: MEDICARE

## 2020-01-01 ENCOUNTER — HOSPITAL ENCOUNTER (INPATIENT)
Facility: HOSPITAL | Age: 55
LOS: 2 days | Discharge: NURSING, REHAB OR LONG-TERM CARE | DRG: 377 | End: 2020-02-21
Attending: EMERGENCY MEDICINE | Admitting: HOSPITALIST
Payer: MEDICARE

## 2020-01-01 VITALS
HEART RATE: 105 BPM | HEIGHT: 66 IN | TEMPERATURE: 96 F | OXYGEN SATURATION: 100 % | BODY MASS INDEX: 19.13 KG/M2 | WEIGHT: 119.06 LBS | SYSTOLIC BLOOD PRESSURE: 141 MMHG | DIASTOLIC BLOOD PRESSURE: 84 MMHG | RESPIRATION RATE: 18 BRPM

## 2020-01-01 VITALS
HEART RATE: 81 BPM | RESPIRATION RATE: 17 BRPM | DIASTOLIC BLOOD PRESSURE: 87 MMHG | WEIGHT: 114.19 LBS | BODY MASS INDEX: 18.35 KG/M2 | SYSTOLIC BLOOD PRESSURE: 159 MMHG | TEMPERATURE: 98 F | OXYGEN SATURATION: 95 % | HEIGHT: 66 IN

## 2020-01-01 VITALS
BODY MASS INDEX: 18.6 KG/M2 | SYSTOLIC BLOOD PRESSURE: 153 MMHG | TEMPERATURE: 98 F | RESPIRATION RATE: 14 BRPM | DIASTOLIC BLOOD PRESSURE: 87 MMHG | OXYGEN SATURATION: 96 % | HEART RATE: 108 BPM | WEIGHT: 115.75 LBS | HEIGHT: 66 IN

## 2020-01-01 VITALS
HEART RATE: 102 BPM | WEIGHT: 120 LBS | SYSTOLIC BLOOD PRESSURE: 123 MMHG | RESPIRATION RATE: 19 BRPM | DIASTOLIC BLOOD PRESSURE: 76 MMHG | OXYGEN SATURATION: 100 % | TEMPERATURE: 99 F | BODY MASS INDEX: 19.37 KG/M2

## 2020-01-01 VITALS
DIASTOLIC BLOOD PRESSURE: 102 MMHG | TEMPERATURE: 99 F | RESPIRATION RATE: 18 BRPM | HEIGHT: 66 IN | BODY MASS INDEX: 19.29 KG/M2 | OXYGEN SATURATION: 100 % | WEIGHT: 120 LBS | SYSTOLIC BLOOD PRESSURE: 228 MMHG | HEART RATE: 87 BPM

## 2020-01-01 VITALS
BODY MASS INDEX: 19.37 KG/M2 | RESPIRATION RATE: 38 BRPM | HEART RATE: 107 BPM | SYSTOLIC BLOOD PRESSURE: 178 MMHG | WEIGHT: 120 LBS | DIASTOLIC BLOOD PRESSURE: 96 MMHG | OXYGEN SATURATION: 96 % | TEMPERATURE: 102 F

## 2020-01-01 VITALS
DIASTOLIC BLOOD PRESSURE: 66 MMHG | TEMPERATURE: 98 F | RESPIRATION RATE: 16 BRPM | SYSTOLIC BLOOD PRESSURE: 93 MMHG | WEIGHT: 117.75 LBS | BODY MASS INDEX: 18.92 KG/M2 | HEIGHT: 66 IN | OXYGEN SATURATION: 98 % | HEART RATE: 92 BPM

## 2020-01-01 DIAGNOSIS — R00.0 TACHYCARDIA: ICD-10-CM

## 2020-01-01 DIAGNOSIS — R50.9 FEVER, UNSPECIFIED FEVER CAUSE: Primary | ICD-10-CM

## 2020-01-01 DIAGNOSIS — N18.6 ESRD ON DIALYSIS: ICD-10-CM

## 2020-01-01 DIAGNOSIS — Z99.2 ESRD ON DIALYSIS: ICD-10-CM

## 2020-01-01 DIAGNOSIS — T85.898A PRESSURE INJURY DUE TO MEDICAL DEVICE: ICD-10-CM

## 2020-01-01 DIAGNOSIS — M89.9 CHRONIC KIDNEY DISEASE-MINERAL AND BONE DISORDER: Chronic | ICD-10-CM

## 2020-01-01 DIAGNOSIS — Z99.2 ANEMIA IN CHRONIC KIDNEY DISEASE, ON CHRONIC DIALYSIS: Chronic | ICD-10-CM

## 2020-01-01 DIAGNOSIS — G93.41 ACUTE METABOLIC ENCEPHALOPATHY: ICD-10-CM

## 2020-01-01 DIAGNOSIS — D63.1 ANEMIA IN CHRONIC KIDNEY DISEASE, ON CHRONIC DIALYSIS: Chronic | ICD-10-CM

## 2020-01-01 DIAGNOSIS — Z71.89 ADVANCED CARE PLANNING/COUNSELING DISCUSSION: ICD-10-CM

## 2020-01-01 DIAGNOSIS — R47.01 APHASIA: ICD-10-CM

## 2020-01-01 DIAGNOSIS — J69.0 ASPIRATION PNEUMONIA OF BOTH LUNGS, UNSPECIFIED ASPIRATION PNEUMONIA TYPE, UNSPECIFIED PART OF LUNG: ICD-10-CM

## 2020-01-01 DIAGNOSIS — I50.32 CHRONIC DIASTOLIC HEART FAILURE: Chronic | ICD-10-CM

## 2020-01-01 DIAGNOSIS — Z86.79 HISTORY OF SPONTANEOUS INTRAPARENCHYMAL INTRACRANIAL HEMORRHAGE ASSOCIATED WITH HYPERTENSION: Chronic | ICD-10-CM

## 2020-01-01 DIAGNOSIS — Z99.2 ESRD (END STAGE RENAL DISEASE) ON DIALYSIS: ICD-10-CM

## 2020-01-01 DIAGNOSIS — Z99.2 END-STAGE RENAL DISEASE ON HEMODIALYSIS: Chronic | ICD-10-CM

## 2020-01-01 DIAGNOSIS — R07.9 CHEST PAIN: ICD-10-CM

## 2020-01-01 DIAGNOSIS — J96.01 ACUTE RESPIRATORY FAILURE WITH HYPOXIA: ICD-10-CM

## 2020-01-01 DIAGNOSIS — B18.2 CHRONIC HEPATITIS C WITHOUT HEPATIC COMA: ICD-10-CM

## 2020-01-01 DIAGNOSIS — K92.2 ACUTE UPPER GI BLEED: ICD-10-CM

## 2020-01-01 DIAGNOSIS — Z99.2 ESRD ON HEMODIALYSIS: Chronic | ICD-10-CM

## 2020-01-01 DIAGNOSIS — N18.6 ANEMIA IN CHRONIC KIDNEY DISEASE, ON CHRONIC DIALYSIS: Chronic | ICD-10-CM

## 2020-01-01 DIAGNOSIS — E83.9 CHRONIC KIDNEY DISEASE-MINERAL AND BONE DISORDER: Chronic | ICD-10-CM

## 2020-01-01 DIAGNOSIS — K92.2 GASTROINTESTINAL HEMORRHAGE, UNSPECIFIED GASTROINTESTINAL HEMORRHAGE TYPE: ICD-10-CM

## 2020-01-01 DIAGNOSIS — N18.6 ESRD (END STAGE RENAL DISEASE) ON DIALYSIS: ICD-10-CM

## 2020-01-01 DIAGNOSIS — D50.0 CHRONIC BLOOD LOSS ANEMIA: ICD-10-CM

## 2020-01-01 DIAGNOSIS — A41.9 SEPSIS DUE TO PNEUMONIA: ICD-10-CM

## 2020-01-01 DIAGNOSIS — N18.9 CHRONIC KIDNEY DISEASE-MINERAL AND BONE DISORDER: Chronic | ICD-10-CM

## 2020-01-01 DIAGNOSIS — I16.1 HYPERTENSIVE EMERGENCY: ICD-10-CM

## 2020-01-01 DIAGNOSIS — D64.9 NORMOCYTIC ANEMIA: ICD-10-CM

## 2020-01-01 DIAGNOSIS — N18.6 END-STAGE RENAL DISEASE ON HEMODIALYSIS: Chronic | ICD-10-CM

## 2020-01-01 DIAGNOSIS — K20.90 ESOPHAGITIS: ICD-10-CM

## 2020-01-01 DIAGNOSIS — K92.0 GASTROINTESTINAL HEMORRHAGE WITH HEMATEMESIS: ICD-10-CM

## 2020-01-01 DIAGNOSIS — U07.1 COVID-19 VIRUS DETECTED: ICD-10-CM

## 2020-01-01 DIAGNOSIS — R06.02 SOB (SHORTNESS OF BREATH): ICD-10-CM

## 2020-01-01 DIAGNOSIS — R41.82 ALTERED MENTAL STATUS: Primary | ICD-10-CM

## 2020-01-01 DIAGNOSIS — K92.0 HEMATEMESIS: ICD-10-CM

## 2020-01-01 DIAGNOSIS — G93.40 ENCEPHALOPATHY: ICD-10-CM

## 2020-01-01 DIAGNOSIS — R11.2 NON-INTRACTABLE VOMITING WITH NAUSEA, UNSPECIFIED VOMITING TYPE: ICD-10-CM

## 2020-01-01 DIAGNOSIS — Z51.5 PALLIATIVE CARE ENCOUNTER: ICD-10-CM

## 2020-01-01 DIAGNOSIS — D72.829 LEUKOCYTOSIS, UNSPECIFIED TYPE: Primary | ICD-10-CM

## 2020-01-01 DIAGNOSIS — I15.0 RENOVASCULAR HYPERTENSION: Chronic | ICD-10-CM

## 2020-01-01 DIAGNOSIS — I95.3 HEMODIALYSIS-ASSOCIATED HYPOTENSION: ICD-10-CM

## 2020-01-01 DIAGNOSIS — I26.99 PULMONARY EMBOLISM: ICD-10-CM

## 2020-01-01 DIAGNOSIS — K29.60 EROSIVE GASTRITIS: ICD-10-CM

## 2020-01-01 DIAGNOSIS — Z01.818 ENCOUNTER FOR INTUBATION: ICD-10-CM

## 2020-01-01 DIAGNOSIS — Z86.15 PERSONAL HISTORY OF LATENT TUBERCULOSIS INFECTION: ICD-10-CM

## 2020-01-01 DIAGNOSIS — L89.90 PRESSURE INJURY DUE TO MEDICAL DEVICE: ICD-10-CM

## 2020-01-01 DIAGNOSIS — R56.9 SEIZURE: ICD-10-CM

## 2020-01-01 DIAGNOSIS — N25.81 SECONDARY HYPERPARATHYROIDISM OF RENAL ORIGIN: ICD-10-CM

## 2020-01-01 DIAGNOSIS — J18.9 PNEUMONIA OF BOTH LUNGS DUE TO INFECTIOUS ORGANISM, UNSPECIFIED PART OF LUNG: ICD-10-CM

## 2020-01-01 DIAGNOSIS — R41.82 ALTERED MENTAL STATUS, UNSPECIFIED ALTERED MENTAL STATUS TYPE: ICD-10-CM

## 2020-01-01 DIAGNOSIS — E78.5 DYSLIPIDEMIA: Chronic | ICD-10-CM

## 2020-01-01 DIAGNOSIS — E43 SEVERE MALNUTRITION: ICD-10-CM

## 2020-01-01 DIAGNOSIS — A41.9 SEPSIS, DUE TO UNSPECIFIED ORGANISM, UNSPECIFIED WHETHER ACUTE ORGAN DYSFUNCTION PRESENT: Primary | ICD-10-CM

## 2020-01-01 DIAGNOSIS — N18.6 ESRD ON DIALYSIS: Primary | ICD-10-CM

## 2020-01-01 DIAGNOSIS — Z99.2 PULMONARY HYPERTENSION ASSOCIATED WITH END-STAGE RENAL DISEASE (ESRD) ON DIALYSIS: Chronic | ICD-10-CM

## 2020-01-01 DIAGNOSIS — K92.2 UGIB (UPPER GASTROINTESTINAL BLEED): ICD-10-CM

## 2020-01-01 DIAGNOSIS — D62 ACUTE BLOOD LOSS ANEMIA: ICD-10-CM

## 2020-01-01 DIAGNOSIS — K92.2 CHRONIC UPPER GI BLEEDING: ICD-10-CM

## 2020-01-01 DIAGNOSIS — I27.29 PULMONARY HYPERTENSION ASSOCIATED WITH END-STAGE RENAL DISEASE (ESRD) ON DIALYSIS: Chronic | ICD-10-CM

## 2020-01-01 DIAGNOSIS — U07.1 COVID-19 VIRUS INFECTION: ICD-10-CM

## 2020-01-01 DIAGNOSIS — Z71.89 GOALS OF CARE, COUNSELING/DISCUSSION: ICD-10-CM

## 2020-01-01 DIAGNOSIS — K31.84 GASTROPARESIS: ICD-10-CM

## 2020-01-01 DIAGNOSIS — E11.51 PERIPHERAL VASCULAR DISEASE IN DIABETES MELLITUS: Chronic | ICD-10-CM

## 2020-01-01 DIAGNOSIS — Z91.199 NONCOMPLIANCE: Chronic | ICD-10-CM

## 2020-01-01 DIAGNOSIS — R50.9 FEVER: Primary | ICD-10-CM

## 2020-01-01 DIAGNOSIS — K21.00 GERD WITH ESOPHAGITIS: ICD-10-CM

## 2020-01-01 DIAGNOSIS — R11.10 VOMITING: ICD-10-CM

## 2020-01-01 DIAGNOSIS — Z99.2 ESRD ON DIALYSIS: Primary | ICD-10-CM

## 2020-01-01 DIAGNOSIS — N18.6 ESRD ON HEMODIALYSIS: Chronic | ICD-10-CM

## 2020-01-01 DIAGNOSIS — T82.858A STENOSIS OF ARTERIOVENOUS DIALYSIS FISTULA, INITIAL ENCOUNTER: ICD-10-CM

## 2020-01-01 DIAGNOSIS — N18.6 PULMONARY HYPERTENSION ASSOCIATED WITH END-STAGE RENAL DISEASE (ESRD) ON DIALYSIS: Chronic | ICD-10-CM

## 2020-01-01 DIAGNOSIS — Z89.512 HISTORY OF LEFT BELOW KNEE AMPUTATION: Chronic | ICD-10-CM

## 2020-01-01 DIAGNOSIS — Z87.19 HISTORY OF GI BLEED: ICD-10-CM

## 2020-01-01 DIAGNOSIS — R50.9 FEVER: ICD-10-CM

## 2020-01-01 DIAGNOSIS — J18.9 SEPSIS DUE TO PNEUMONIA: ICD-10-CM

## 2020-01-01 DIAGNOSIS — A41.2: ICD-10-CM

## 2020-01-01 DIAGNOSIS — K92.2 UPPER GI BLEED: Primary | ICD-10-CM

## 2020-01-01 DIAGNOSIS — R11.10 VOMITING: Primary | ICD-10-CM

## 2020-01-01 DIAGNOSIS — R06.00 DYSPNEA: ICD-10-CM

## 2020-01-01 LAB
ABO + RH BLD: NORMAL
ABO GROUP BLD: NORMAL
ACID FAST MOD KINY STN SPEC: NORMAL
ALBUMIN SERPL BCP-MCNC: 1.1 G/DL (ref 3.5–5.2)
ALBUMIN SERPL BCP-MCNC: 1.2 G/DL (ref 3.5–5.2)
ALBUMIN SERPL BCP-MCNC: 1.3 G/DL (ref 3.5–5.2)
ALBUMIN SERPL BCP-MCNC: 1.4 G/DL (ref 3.5–5.2)
ALBUMIN SERPL BCP-MCNC: 1.5 G/DL (ref 3.5–5.2)
ALBUMIN SERPL BCP-MCNC: 1.6 G/DL (ref 3.5–5.2)
ALBUMIN SERPL BCP-MCNC: 1.7 G/DL (ref 3.5–5.2)
ALBUMIN SERPL BCP-MCNC: 1.9 G/DL (ref 3.5–5.2)
ALBUMIN SERPL BCP-MCNC: 2 G/DL (ref 3.5–5.2)
ALBUMIN SERPL BCP-MCNC: 2.1 G/DL (ref 3.5–5.2)
ALBUMIN SERPL BCP-MCNC: 2.2 G/DL (ref 3.5–5.2)
ALBUMIN SERPL BCP-MCNC: 2.3 G/DL (ref 3.5–5.2)
ALBUMIN SERPL BCP-MCNC: 2.4 G/DL (ref 3.5–5.2)
ALBUMIN SERPL BCP-MCNC: 2.5 G/DL (ref 3.5–5.2)
ALBUMIN SERPL BCP-MCNC: 2.6 G/DL (ref 3.5–5.2)
ALBUMIN SERPL BCP-MCNC: 2.8 G/DL (ref 3.5–5.2)
ALBUMIN SERPL BCP-MCNC: 2.9 G/DL (ref 3.5–5.2)
ALBUMIN SERPL BCP-MCNC: 2.9 G/DL (ref 3.5–5.2)
ALBUMIN SERPL BCP-MCNC: 3 G/DL (ref 3.5–5.2)
ALBUMIN SERPL BCP-MCNC: 3.1 G/DL (ref 3.5–5.2)
ALBUMIN SERPL BCP-MCNC: 3.1 G/DL (ref 3.5–5.2)
ALBUMIN SERPL BCP-MCNC: 3.2 G/DL (ref 3.5–5.2)
ALBUMIN SERPL BCP-MCNC: 3.7 G/DL (ref 3.5–5.2)
ALLENS TEST: ABNORMAL
ALP SERPL-CCNC: 103 U/L (ref 55–135)
ALP SERPL-CCNC: 104 U/L (ref 55–135)
ALP SERPL-CCNC: 118 U/L (ref 55–135)
ALP SERPL-CCNC: 118 U/L (ref 55–135)
ALP SERPL-CCNC: 124 U/L (ref 55–135)
ALP SERPL-CCNC: 125 U/L (ref 55–135)
ALP SERPL-CCNC: 127 U/L (ref 55–135)
ALP SERPL-CCNC: 136 U/L (ref 55–135)
ALP SERPL-CCNC: 137 U/L (ref 55–135)
ALP SERPL-CCNC: 139 U/L (ref 55–135)
ALP SERPL-CCNC: 143 U/L (ref 55–135)
ALP SERPL-CCNC: 151 U/L (ref 55–135)
ALP SERPL-CCNC: 173 U/L (ref 55–135)
ALP SERPL-CCNC: 175 U/L (ref 55–135)
ALP SERPL-CCNC: 179 U/L (ref 55–135)
ALP SERPL-CCNC: 181 U/L (ref 55–135)
ALP SERPL-CCNC: 196 U/L (ref 55–135)
ALP SERPL-CCNC: 198 U/L (ref 55–135)
ALP SERPL-CCNC: 202 U/L (ref 55–135)
ALP SERPL-CCNC: 208 U/L (ref 55–135)
ALP SERPL-CCNC: 208 U/L (ref 55–135)
ALP SERPL-CCNC: 216 U/L (ref 55–135)
ALP SERPL-CCNC: 219 U/L (ref 55–135)
ALP SERPL-CCNC: 219 U/L (ref 55–135)
ALP SERPL-CCNC: 222 U/L (ref 55–135)
ALP SERPL-CCNC: 223 U/L (ref 55–135)
ALP SERPL-CCNC: 228 U/L (ref 55–135)
ALP SERPL-CCNC: 231 U/L (ref 55–135)
ALP SERPL-CCNC: 236 U/L (ref 55–135)
ALP SERPL-CCNC: 236 U/L (ref 55–135)
ALP SERPL-CCNC: 255 U/L (ref 55–135)
ALP SERPL-CCNC: 256 U/L (ref 55–135)
ALP SERPL-CCNC: 260 U/L (ref 55–135)
ALP SERPL-CCNC: 333 U/L (ref 55–135)
ALP SERPL-CCNC: 75 U/L (ref 55–135)
ALP SERPL-CCNC: 79 U/L (ref 55–135)
ALP SERPL-CCNC: 80 U/L (ref 55–135)
ALP SERPL-CCNC: 80 U/L (ref 55–135)
ALP SERPL-CCNC: 81 U/L (ref 55–135)
ALP SERPL-CCNC: 81 U/L (ref 55–135)
ALP SERPL-CCNC: 85 U/L (ref 55–135)
ALP SERPL-CCNC: 90 U/L (ref 55–135)
ALP SERPL-CCNC: 91 U/L (ref 55–135)
ALP SERPL-CCNC: 95 U/L (ref 55–135)
ALP SERPL-CCNC: 96 U/L (ref 55–135)
ALP SERPL-CCNC: 98 U/L (ref 55–135)
ALT SERPL W/O P-5'-P-CCNC: 10 U/L (ref 10–44)
ALT SERPL W/O P-5'-P-CCNC: 12 U/L (ref 10–44)
ALT SERPL W/O P-5'-P-CCNC: 12 U/L (ref 10–44)
ALT SERPL W/O P-5'-P-CCNC: 14 U/L (ref 10–44)
ALT SERPL W/O P-5'-P-CCNC: 15 U/L (ref 10–44)
ALT SERPL W/O P-5'-P-CCNC: 16 U/L (ref 10–44)
ALT SERPL W/O P-5'-P-CCNC: 16 U/L (ref 10–44)
ALT SERPL W/O P-5'-P-CCNC: 18 U/L (ref 10–44)
ALT SERPL W/O P-5'-P-CCNC: 19 U/L (ref 10–44)
ALT SERPL W/O P-5'-P-CCNC: 20 U/L (ref 10–44)
ALT SERPL W/O P-5'-P-CCNC: 20 U/L (ref 10–44)
ALT SERPL W/O P-5'-P-CCNC: 22 U/L (ref 10–44)
ALT SERPL W/O P-5'-P-CCNC: 25 U/L (ref 10–44)
ALT SERPL W/O P-5'-P-CCNC: 26 U/L (ref 10–44)
ALT SERPL W/O P-5'-P-CCNC: 27 U/L (ref 10–44)
ALT SERPL W/O P-5'-P-CCNC: 28 U/L (ref 10–44)
ALT SERPL W/O P-5'-P-CCNC: 29 U/L (ref 10–44)
ALT SERPL W/O P-5'-P-CCNC: 31 U/L (ref 10–44)
ALT SERPL W/O P-5'-P-CCNC: 34 U/L (ref 10–44)
ALT SERPL W/O P-5'-P-CCNC: 37 U/L (ref 10–44)
ALT SERPL W/O P-5'-P-CCNC: 37 U/L (ref 10–44)
ALT SERPL W/O P-5'-P-CCNC: 38 U/L (ref 10–44)
ALT SERPL W/O P-5'-P-CCNC: 41 U/L (ref 10–44)
ALT SERPL W/O P-5'-P-CCNC: 47 U/L (ref 10–44)
ALT SERPL W/O P-5'-P-CCNC: 47 U/L (ref 10–44)
ALT SERPL W/O P-5'-P-CCNC: 5 U/L (ref 10–44)
ALT SERPL W/O P-5'-P-CCNC: 50 U/L (ref 10–44)
ALT SERPL W/O P-5'-P-CCNC: 56 U/L (ref 10–44)
ALT SERPL W/O P-5'-P-CCNC: 6 U/L (ref 10–44)
ALT SERPL W/O P-5'-P-CCNC: 6 U/L (ref 10–44)
ALT SERPL W/O P-5'-P-CCNC: 69 U/L (ref 10–44)
ALT SERPL W/O P-5'-P-CCNC: 7 U/L (ref 10–44)
ALT SERPL W/O P-5'-P-CCNC: 8 U/L (ref 10–44)
ALT SERPL W/O P-5'-P-CCNC: 8 U/L (ref 10–44)
ALT SERPL W/O P-5'-P-CCNC: 9 U/L (ref 10–44)
ALT SERPL W/O P-5'-P-CCNC: <5 U/L (ref 10–44)
AMMONIA PLAS-SCNC: 30 UMOL/L (ref 10–50)
AMMONIA PLAS-SCNC: 36 UMOL/L (ref 10–50)
AMMONIA PLAS-SCNC: 45 UMOL/L (ref 10–50)
AMPHET+METHAMPHET UR QL: NEGATIVE
ANION GAP SERPL CALC-SCNC: 10 MMOL/L (ref 8–16)
ANION GAP SERPL CALC-SCNC: 11 MMOL/L (ref 8–16)
ANION GAP SERPL CALC-SCNC: 12 MMOL/L (ref 8–16)
ANION GAP SERPL CALC-SCNC: 13 MMOL/L (ref 8–16)
ANION GAP SERPL CALC-SCNC: 14 MMOL/L (ref 8–16)
ANION GAP SERPL CALC-SCNC: 15 MMOL/L (ref 8–16)
ANION GAP SERPL CALC-SCNC: 16 MMOL/L (ref 8–16)
ANION GAP SERPL CALC-SCNC: 17 MMOL/L (ref 8–16)
ANION GAP SERPL CALC-SCNC: 18 MMOL/L (ref 8–16)
ANION GAP SERPL CALC-SCNC: 19 MMOL/L (ref 8–16)
ANION GAP SERPL CALC-SCNC: 19 MMOL/L (ref 8–16)
ANION GAP SERPL CALC-SCNC: 20 MMOL/L (ref 8–16)
ANION GAP SERPL CALC-SCNC: 21 MMOL/L (ref 8–16)
ANION GAP SERPL CALC-SCNC: 21 MMOL/L (ref 8–16)
ANION GAP SERPL CALC-SCNC: 24 MMOL/L (ref 8–16)
ANION GAP SERPL CALC-SCNC: 26 MMOL/L (ref 8–16)
ANION GAP SERPL CALC-SCNC: 8 MMOL/L (ref 8–16)
ANISOCYTOSIS BLD QL SMEAR: SLIGHT
APTT BLDCRRT: 21.5 SEC (ref 21–32)
APTT BLDCRRT: 21.6 SEC (ref 21–32)
APTT BLDCRRT: 22.5 SEC (ref 21–32)
APTT BLDCRRT: 22.6 SEC (ref 21–32)
APTT BLDCRRT: 24.6 SEC (ref 21–32)
APTT BLDCRRT: 25.4 SEC (ref 21–32)
APTT BLDCRRT: 25.7 SEC (ref 21–32)
APTT BLDCRRT: 25.9 SEC (ref 21–32)
APTT BLDCRRT: 27.1 SEC (ref 21–32)
APTT BLDCRRT: 30.7 SEC (ref 21–32)
APTT BLDCRRT: 31.2 SEC (ref 21–32)
APTT BLDCRRT: 33 SEC (ref 21–32)
APTT BLDCRRT: 35 SEC (ref 21–32)
APTT BLDCRRT: 36.6 SEC (ref 21–32)
APTT BLDCRRT: 38.4 SEC (ref 21–32)
APTT BLDCRRT: 38.4 SEC (ref 21–32)
APTT BLDCRRT: 39.2 SEC (ref 21–32)
APTT BLDCRRT: 40.1 SEC (ref 21–32)
APTT BLDCRRT: 41.3 SEC (ref 21–32)
APTT BLDCRRT: 41.7 SEC (ref 21–32)
APTT BLDCRRT: 42 SEC (ref 21–32)
APTT BLDCRRT: 42.1 SEC (ref 21–32)
APTT BLDCRRT: 45 SEC (ref 21–32)
APTT BLDCRRT: 45 SEC (ref 21–32)
APTT BLDCRRT: 50 SEC (ref 21–32)
APTT BLDCRRT: 80.1 SEC (ref 21–32)
APTT BLDCRRT: <21 SEC (ref 21–32)
ASCENDING AORTA: 2.53 CM
ASCENDING AORTA: 2.73 CM
AST SERPL-CCNC: 123 U/L (ref 10–40)
AST SERPL-CCNC: 18 U/L (ref 10–40)
AST SERPL-CCNC: 19 U/L (ref 10–40)
AST SERPL-CCNC: 21 U/L (ref 10–40)
AST SERPL-CCNC: 21 U/L (ref 10–40)
AST SERPL-CCNC: 210 U/L (ref 10–40)
AST SERPL-CCNC: 22 U/L (ref 10–40)
AST SERPL-CCNC: 22 U/L (ref 10–40)
AST SERPL-CCNC: 23 U/L (ref 10–40)
AST SERPL-CCNC: 24 U/L (ref 10–40)
AST SERPL-CCNC: 24 U/L (ref 10–40)
AST SERPL-CCNC: 25 U/L (ref 10–40)
AST SERPL-CCNC: 28 U/L (ref 10–40)
AST SERPL-CCNC: 28 U/L (ref 10–40)
AST SERPL-CCNC: 29 U/L (ref 10–40)
AST SERPL-CCNC: 29 U/L (ref 10–40)
AST SERPL-CCNC: 30 U/L (ref 10–40)
AST SERPL-CCNC: 31 U/L (ref 10–40)
AST SERPL-CCNC: 31 U/L (ref 10–40)
AST SERPL-CCNC: 32 U/L (ref 10–40)
AST SERPL-CCNC: 32 U/L (ref 10–40)
AST SERPL-CCNC: 33 U/L (ref 10–40)
AST SERPL-CCNC: 33 U/L (ref 10–40)
AST SERPL-CCNC: 34 U/L (ref 10–40)
AST SERPL-CCNC: 35 U/L (ref 10–40)
AST SERPL-CCNC: 35 U/L (ref 10–40)
AST SERPL-CCNC: 36 U/L (ref 10–40)
AST SERPL-CCNC: 37 U/L (ref 10–40)
AST SERPL-CCNC: 38 U/L (ref 10–40)
AST SERPL-CCNC: 39 U/L (ref 10–40)
AST SERPL-CCNC: 40 U/L (ref 10–40)
AST SERPL-CCNC: 40 U/L (ref 10–40)
AST SERPL-CCNC: 41 U/L (ref 10–40)
AST SERPL-CCNC: 43 U/L (ref 10–40)
AST SERPL-CCNC: 45 U/L (ref 10–40)
AST SERPL-CCNC: 47 U/L (ref 10–40)
AST SERPL-CCNC: 49 U/L (ref 10–40)
AST SERPL-CCNC: 50 U/L (ref 10–40)
AST SERPL-CCNC: 52 U/L (ref 10–40)
AST SERPL-CCNC: 53 U/L (ref 10–40)
AST SERPL-CCNC: 57 U/L (ref 10–40)
AST SERPL-CCNC: 76 U/L (ref 10–40)
AV INDEX (PROSTH): 0.65
AV INDEX (PROSTH): 0.96
AV MEAN GRADIENT: 4 MMHG
AV MEAN GRADIENT: 6 MMHG
AV PEAK GRADIENT: 8 MMHG
AV PEAK GRADIENT: 9 MMHG
AV VALVE AREA: 2.37 CM2
AV VALVE AREA: 3.81 CM2
AV VELOCITY RATIO: 0.7
AV VELOCITY RATIO: 0.76
BACTERIA #/AREA URNS AUTO: ABNORMAL /HPF
BACTERIA BLD CULT: ABNORMAL
BACTERIA BLD CULT: NORMAL
BACTERIA CSF CULT: NO GROWTH
BACTERIA SPEC AEROBE CULT: ABNORMAL
BACTERIA SPEC AEROBE CULT: NORMAL
BARBITURATES UR QL SCN>200 NG/ML: NEGATIVE
BASOPHILS # BLD AUTO: 0.01 K/UL (ref 0–0.2)
BASOPHILS # BLD AUTO: 0.02 K/UL (ref 0–0.2)
BASOPHILS # BLD AUTO: 0.03 K/UL (ref 0–0.2)
BASOPHILS # BLD AUTO: 0.04 K/UL (ref 0–0.2)
BASOPHILS # BLD AUTO: 0.05 K/UL (ref 0–0.2)
BASOPHILS # BLD AUTO: 0.06 K/UL (ref 0–0.2)
BASOPHILS # BLD AUTO: 0.06 K/UL (ref 0–0.2)
BASOPHILS # BLD AUTO: 0.07 K/UL (ref 0–0.2)
BASOPHILS NFR BLD: 0 % (ref 0–1.9)
BASOPHILS NFR BLD: 0.1 % (ref 0–1.9)
BASOPHILS NFR BLD: 0.2 % (ref 0–1.9)
BASOPHILS NFR BLD: 0.3 % (ref 0–1.9)
BASOPHILS NFR BLD: 0.4 % (ref 0–1.9)
BASOPHILS NFR BLD: 0.5 % (ref 0–1.9)
BASOPHILS NFR BLD: 0.6 % (ref 0–1.9)
BASOPHILS NFR BLD: 0.7 % (ref 0–1.9)
BASOPHILS NFR BLD: 0.8 % (ref 0–1.9)
BASOPHILS NFR BLD: 2 % (ref 0–1.9)
BENZODIAZ UR QL SCN>200 NG/ML: NEGATIVE
BILIRUB SERPL-MCNC: 0.2 MG/DL (ref 0.1–1)
BILIRUB SERPL-MCNC: 0.3 MG/DL (ref 0.1–1)
BILIRUB SERPL-MCNC: 0.4 MG/DL (ref 0.1–1)
BILIRUB SERPL-MCNC: 0.5 MG/DL (ref 0.1–1)
BILIRUB SERPL-MCNC: 1 MG/DL (ref 0.1–1)
BILIRUB UR QL STRIP: NEGATIVE
BLD GP AB SCN CELLS X3 SERPL QL: NORMAL
BLD PROD TYP BPU: NORMAL
BLOOD UNIT EXPIRATION DATE: NORMAL
BLOOD UNIT TYPE CODE: 6200
BLOOD UNIT TYPE: NORMAL
BNP SERPL-MCNC: 1089 PG/ML (ref 0–99)
BNP SERPL-MCNC: 1748 PG/ML (ref 0–99)
BSA FOR ECHO PROCEDURE: 1.44 M2
BSA FOR ECHO PROCEDURE: 1.63 M2
BUN SERPL-MCNC: 102 MG/DL (ref 6–20)
BUN SERPL-MCNC: 12 MG/DL (ref 6–20)
BUN SERPL-MCNC: 13 MG/DL (ref 6–20)
BUN SERPL-MCNC: 15 MG/DL (ref 6–20)
BUN SERPL-MCNC: 23 MG/DL (ref 6–20)
BUN SERPL-MCNC: 24 MG/DL (ref 6–20)
BUN SERPL-MCNC: 27 MG/DL (ref 6–20)
BUN SERPL-MCNC: 29 MG/DL (ref 6–20)
BUN SERPL-MCNC: 30 MG/DL (ref 6–20)
BUN SERPL-MCNC: 32 MG/DL (ref 6–20)
BUN SERPL-MCNC: 33 MG/DL (ref 6–20)
BUN SERPL-MCNC: 33 MG/DL (ref 6–20)
BUN SERPL-MCNC: 35 MG/DL (ref 6–20)
BUN SERPL-MCNC: 37 MG/DL (ref 6–20)
BUN SERPL-MCNC: 37 MG/DL (ref 6–20)
BUN SERPL-MCNC: 38 MG/DL (ref 6–20)
BUN SERPL-MCNC: 38 MG/DL (ref 6–20)
BUN SERPL-MCNC: 41 MG/DL (ref 6–20)
BUN SERPL-MCNC: 41 MG/DL (ref 6–20)
BUN SERPL-MCNC: 43 MG/DL (ref 6–20)
BUN SERPL-MCNC: 44 MG/DL (ref 6–20)
BUN SERPL-MCNC: 45 MG/DL (ref 6–20)
BUN SERPL-MCNC: 46 MG/DL (ref 6–20)
BUN SERPL-MCNC: 47 MG/DL (ref 6–20)
BUN SERPL-MCNC: 48 MG/DL (ref 6–20)
BUN SERPL-MCNC: 48 MG/DL (ref 6–20)
BUN SERPL-MCNC: 49 MG/DL (ref 6–20)
BUN SERPL-MCNC: 50 MG/DL (ref 6–20)
BUN SERPL-MCNC: 50 MG/DL (ref 6–20)
BUN SERPL-MCNC: 51 MG/DL (ref 6–20)
BUN SERPL-MCNC: 54 MG/DL (ref 6–30)
BUN SERPL-MCNC: 55 MG/DL (ref 6–20)
BUN SERPL-MCNC: 56 MG/DL (ref 6–20)
BUN SERPL-MCNC: 56 MG/DL (ref 6–20)
BUN SERPL-MCNC: 57 MG/DL (ref 6–20)
BUN SERPL-MCNC: 58 MG/DL (ref 6–20)
BUN SERPL-MCNC: 58 MG/DL (ref 6–20)
BUN SERPL-MCNC: 59 MG/DL (ref 6–20)
BUN SERPL-MCNC: 59 MG/DL (ref 6–20)
BUN SERPL-MCNC: 60 MG/DL (ref 6–20)
BUN SERPL-MCNC: 61 MG/DL (ref 6–20)
BUN SERPL-MCNC: 62 MG/DL (ref 6–20)
BUN SERPL-MCNC: 64 MG/DL (ref 6–20)
BUN SERPL-MCNC: 66 MG/DL (ref 6–20)
BUN SERPL-MCNC: 67 MG/DL (ref 6–20)
BUN SERPL-MCNC: 67 MG/DL (ref 6–20)
BUN SERPL-MCNC: 68 MG/DL (ref 6–20)
BUN SERPL-MCNC: 70 MG/DL (ref 6–20)
BUN SERPL-MCNC: 71 MG/DL (ref 6–20)
BUN SERPL-MCNC: 72 MG/DL (ref 6–20)
BUN SERPL-MCNC: 72 MG/DL (ref 6–20)
BUN SERPL-MCNC: 74 MG/DL (ref 6–20)
BUN SERPL-MCNC: 75 MG/DL (ref 6–20)
BUN SERPL-MCNC: 79 MG/DL (ref 6–20)
BUN SERPL-MCNC: 80 MG/DL (ref 6–20)
BUN SERPL-MCNC: 81 MG/DL (ref 6–20)
BUN SERPL-MCNC: 81 MG/DL (ref 6–20)
BUN SERPL-MCNC: 83 MG/DL (ref 6–20)
BUN SERPL-MCNC: 86 MG/DL (ref 6–20)
BUN SERPL-MCNC: 94 MG/DL (ref 6–20)
BUN SERPL-MCNC: 95 MG/DL (ref 6–20)
BURR CELLS BLD QL SMEAR: ABNORMAL
BZE UR QL SCN: NEGATIVE
CALCIUM SERPL-MCNC: 10 MG/DL (ref 8.7–10.5)
CALCIUM SERPL-MCNC: 10.1 MG/DL (ref 8.7–10.5)
CALCIUM SERPL-MCNC: 10.2 MG/DL (ref 8.7–10.5)
CALCIUM SERPL-MCNC: 10.3 MG/DL (ref 8.7–10.5)
CALCIUM SERPL-MCNC: 10.3 MG/DL (ref 8.7–10.5)
CALCIUM SERPL-MCNC: 10.4 MG/DL (ref 8.7–10.5)
CALCIUM SERPL-MCNC: 10.5 MG/DL (ref 8.7–10.5)
CALCIUM SERPL-MCNC: 10.6 MG/DL (ref 8.7–10.5)
CALCIUM SERPL-MCNC: 10.7 MG/DL (ref 8.7–10.5)
CALCIUM SERPL-MCNC: 11 MG/DL (ref 8.7–10.5)
CALCIUM SERPL-MCNC: 7.4 MG/DL (ref 8.7–10.5)
CALCIUM SERPL-MCNC: 7.7 MG/DL (ref 8.7–10.5)
CALCIUM SERPL-MCNC: 7.8 MG/DL (ref 8.7–10.5)
CALCIUM SERPL-MCNC: 7.9 MG/DL (ref 8.7–10.5)
CALCIUM SERPL-MCNC: 7.9 MG/DL (ref 8.7–10.5)
CALCIUM SERPL-MCNC: 8.1 MG/DL (ref 8.7–10.5)
CALCIUM SERPL-MCNC: 8.3 MG/DL (ref 8.7–10.5)
CALCIUM SERPL-MCNC: 8.5 MG/DL (ref 8.7–10.5)
CALCIUM SERPL-MCNC: 8.8 MG/DL (ref 8.7–10.5)
CALCIUM SERPL-MCNC: 8.9 MG/DL (ref 8.7–10.5)
CALCIUM SERPL-MCNC: 8.9 MG/DL (ref 8.7–10.5)
CALCIUM SERPL-MCNC: 9 MG/DL (ref 8.7–10.5)
CALCIUM SERPL-MCNC: 9 MG/DL (ref 8.7–10.5)
CALCIUM SERPL-MCNC: 9.1 MG/DL (ref 8.7–10.5)
CALCIUM SERPL-MCNC: 9.2 MG/DL (ref 8.7–10.5)
CALCIUM SERPL-MCNC: 9.3 MG/DL (ref 8.7–10.5)
CALCIUM SERPL-MCNC: 9.4 MG/DL (ref 8.7–10.5)
CALCIUM SERPL-MCNC: 9.5 MG/DL (ref 8.7–10.5)
CALCIUM SERPL-MCNC: 9.6 MG/DL (ref 8.7–10.5)
CALCIUM SERPL-MCNC: 9.7 MG/DL (ref 8.7–10.5)
CALCIUM SERPL-MCNC: 9.8 MG/DL (ref 8.7–10.5)
CALCIUM SERPL-MCNC: 9.9 MG/DL (ref 8.7–10.5)
CANNABINOIDS UR QL SCN: NEGATIVE
CHLORIDE SERPL-SCNC: 100 MMOL/L (ref 95–110)
CHLORIDE SERPL-SCNC: 101 MMOL/L (ref 95–110)
CHLORIDE SERPL-SCNC: 102 MMOL/L (ref 95–110)
CHLORIDE SERPL-SCNC: 103 MMOL/L (ref 95–110)
CHLORIDE SERPL-SCNC: 104 MMOL/L (ref 95–110)
CHLORIDE SERPL-SCNC: 107 MMOL/L (ref 95–110)
CHLORIDE SERPL-SCNC: 85 MMOL/L (ref 95–110)
CHLORIDE SERPL-SCNC: 89 MMOL/L (ref 95–110)
CHLORIDE SERPL-SCNC: 90 MMOL/L (ref 95–110)
CHLORIDE SERPL-SCNC: 91 MMOL/L (ref 95–110)
CHLORIDE SERPL-SCNC: 93 MMOL/L (ref 95–110)
CHLORIDE SERPL-SCNC: 94 MMOL/L (ref 95–110)
CHLORIDE SERPL-SCNC: 95 MMOL/L (ref 95–110)
CHLORIDE SERPL-SCNC: 96 MMOL/L (ref 95–110)
CHLORIDE SERPL-SCNC: 97 MMOL/L (ref 95–110)
CHLORIDE SERPL-SCNC: 98 MMOL/L (ref 95–110)
CHLORIDE SERPL-SCNC: 99 MMOL/L (ref 95–110)
CK BB CFR SERPL ELPH: 0 %
CK MB CFR SERPL ELPH: 0 % (ref 0–3.3)
CK MM CFR SERPL ELPH: 100 % (ref 96.7–100)
CK SERPL-CCNC: 104 U/L (ref 30–223)
CK SERPL-CCNC: 115 U/L (ref 20–200)
CK SERPL-CCNC: 202 U/L (ref 20–200)
CK SERPL-CCNC: 22 U/L (ref 20–200)
CK SERPL-CCNC: 26 U/L (ref 20–200)
CK SERPL-CCNC: 44 U/L (ref 20–200)
CK SERPL-CCNC: 83 U/L (ref 20–200)
CK SERPL-CCNC: 957 U/L (ref 20–200)
CLARITY CSF: CLEAR
CLARITY CSF: CLEAR
CLARITY UR REFRACT.AUTO: ABNORMAL
CO2 SERPL-SCNC: 17 MMOL/L (ref 23–29)
CO2 SERPL-SCNC: 17 MMOL/L (ref 23–29)
CO2 SERPL-SCNC: 18 MMOL/L (ref 23–29)
CO2 SERPL-SCNC: 19 MMOL/L (ref 23–29)
CO2 SERPL-SCNC: 20 MMOL/L (ref 23–29)
CO2 SERPL-SCNC: 21 MMOL/L (ref 23–29)
CO2 SERPL-SCNC: 22 MMOL/L (ref 23–29)
CO2 SERPL-SCNC: 23 MMOL/L (ref 23–29)
CO2 SERPL-SCNC: 24 MMOL/L (ref 23–29)
CO2 SERPL-SCNC: 25 MMOL/L (ref 23–29)
CO2 SERPL-SCNC: 26 MMOL/L (ref 23–29)
CO2 SERPL-SCNC: 27 MMOL/L (ref 23–29)
CO2 SERPL-SCNC: 28 MMOL/L (ref 23–29)
CO2 SERPL-SCNC: 29 MMOL/L (ref 23–29)
CO2 SERPL-SCNC: 30 MMOL/L (ref 23–29)
CO2 SERPL-SCNC: 36 MMOL/L (ref 23–29)
CODING SYSTEM: NORMAL
COLOR CSF: COLORLESS
COLOR CSF: COLORLESS
COLOR UR AUTO: YELLOW
CREAT SERPL-MCNC: 10.1 MG/DL (ref 0.5–1.4)
CREAT SERPL-MCNC: 10.2 MG/DL (ref 0.5–1.4)
CREAT SERPL-MCNC: 10.4 MG/DL (ref 0.5–1.4)
CREAT SERPL-MCNC: 10.5 MG/DL (ref 0.5–1.4)
CREAT SERPL-MCNC: 10.8 MG/DL (ref 0.5–1.4)
CREAT SERPL-MCNC: 11.9 MG/DL (ref 0.5–1.4)
CREAT SERPL-MCNC: 2.3 MG/DL (ref 0.5–1.4)
CREAT SERPL-MCNC: 2.5 MG/DL (ref 0.5–1.4)
CREAT SERPL-MCNC: 2.6 MG/DL (ref 0.5–1.4)
CREAT SERPL-MCNC: 2.8 MG/DL (ref 0.5–1.4)
CREAT SERPL-MCNC: 2.9 MG/DL (ref 0.5–1.4)
CREAT SERPL-MCNC: 3.1 MG/DL (ref 0.5–1.4)
CREAT SERPL-MCNC: 3.1 MG/DL (ref 0.5–1.4)
CREAT SERPL-MCNC: 3.2 MG/DL (ref 0.5–1.4)
CREAT SERPL-MCNC: 3.5 MG/DL (ref 0.5–1.4)
CREAT SERPL-MCNC: 3.6 MG/DL (ref 0.5–1.4)
CREAT SERPL-MCNC: 3.8 MG/DL (ref 0.5–1.4)
CREAT SERPL-MCNC: 3.8 MG/DL (ref 0.5–1.4)
CREAT SERPL-MCNC: 4.1 MG/DL (ref 0.5–1.4)
CREAT SERPL-MCNC: 4.2 MG/DL (ref 0.5–1.4)
CREAT SERPL-MCNC: 4.3 MG/DL (ref 0.5–1.4)
CREAT SERPL-MCNC: 4.4 MG/DL (ref 0.5–1.4)
CREAT SERPL-MCNC: 4.5 MG/DL (ref 0.5–1.4)
CREAT SERPL-MCNC: 4.8 MG/DL (ref 0.5–1.4)
CREAT SERPL-MCNC: 4.9 MG/DL (ref 0.5–1.4)
CREAT SERPL-MCNC: 5 MG/DL (ref 0.5–1.4)
CREAT SERPL-MCNC: 5.1 MG/DL (ref 0.5–1.4)
CREAT SERPL-MCNC: 5.2 MG/DL (ref 0.5–1.4)
CREAT SERPL-MCNC: 5.2 MG/DL (ref 0.5–1.4)
CREAT SERPL-MCNC: 5.3 MG/DL (ref 0.5–1.4)
CREAT SERPL-MCNC: 5.4 MG/DL (ref 0.5–1.4)
CREAT SERPL-MCNC: 5.5 MG/DL (ref 0.5–1.4)
CREAT SERPL-MCNC: 5.5 MG/DL (ref 0.5–1.4)
CREAT SERPL-MCNC: 5.6 MG/DL (ref 0.5–1.4)
CREAT SERPL-MCNC: 5.7 MG/DL (ref 0.5–1.4)
CREAT SERPL-MCNC: 6.1 MG/DL (ref 0.5–1.4)
CREAT SERPL-MCNC: 6.2 MG/DL (ref 0.5–1.4)
CREAT SERPL-MCNC: 6.3 MG/DL (ref 0.5–1.4)
CREAT SERPL-MCNC: 6.4 MG/DL (ref 0.5–1.4)
CREAT SERPL-MCNC: 6.7 MG/DL (ref 0.5–1.4)
CREAT SERPL-MCNC: 6.7 MG/DL (ref 0.5–1.4)
CREAT SERPL-MCNC: 6.8 MG/DL (ref 0.5–1.4)
CREAT SERPL-MCNC: 7 MG/DL (ref 0.5–1.4)
CREAT SERPL-MCNC: 7.1 MG/DL (ref 0.5–1.4)
CREAT SERPL-MCNC: 7.2 MG/DL (ref 0.5–1.4)
CREAT SERPL-MCNC: 7.2 MG/DL (ref 0.5–1.4)
CREAT SERPL-MCNC: 7.4 MG/DL (ref 0.5–1.4)
CREAT SERPL-MCNC: 7.4 MG/DL (ref 0.5–1.4)
CREAT SERPL-MCNC: 7.5 MG/DL (ref 0.5–1.4)
CREAT SERPL-MCNC: 7.6 MG/DL (ref 0.5–1.4)
CREAT SERPL-MCNC: 7.6 MG/DL (ref 0.5–1.4)
CREAT SERPL-MCNC: 8 MG/DL (ref 0.5–1.4)
CREAT SERPL-MCNC: 8.1 MG/DL (ref 0.5–1.4)
CREAT SERPL-MCNC: 8.4 MG/DL (ref 0.5–1.4)
CREAT SERPL-MCNC: 8.6 MG/DL (ref 0.5–1.4)
CREAT SERPL-MCNC: 8.7 MG/DL (ref 0.5–1.4)
CREAT SERPL-MCNC: 8.8 MG/DL (ref 0.5–1.4)
CREAT SERPL-MCNC: 8.8 MG/DL (ref 0.5–1.4)
CREAT SERPL-MCNC: 9.1 MG/DL (ref 0.5–1.4)
CREAT SERPL-MCNC: 9.1 MG/DL (ref 0.5–1.4)
CREAT SERPL-MCNC: 9.3 MG/DL (ref 0.5–1.4)
CREAT SERPL-MCNC: 9.4 MG/DL (ref 0.5–1.4)
CREAT SERPL-MCNC: 9.8 MG/DL (ref 0.5–1.4)
CREAT UR-MCNC: 56 MG/DL (ref 23–375)
CRP SERPL-MCNC: 132.8 MG/L (ref 0–8.2)
CRP SERPL-MCNC: 145.6 MG/L (ref 0–8.2)
CRP SERPL-MCNC: 165.4 MG/L (ref 0–8.2)
CRP SERPL-MCNC: 167.1 MG/L (ref 0–8.2)
CRP SERPL-MCNC: 175.4 MG/L (ref 0–8.2)
CRP SERPL-MCNC: 185.1 MG/L (ref 0–8.2)
CRP SERPL-MCNC: 192.9 MG/L (ref 0–8.2)
CRP SERPL-MCNC: 200.1 MG/L (ref 0–8.2)
CRP SERPL-MCNC: 202.3 MG/L (ref 0–8.2)
CRP SERPL-MCNC: 224.4 MG/L (ref 0–8.2)
CRP SERPL-MCNC: 265.7 MG/L (ref 0–8.2)
CRP SERPL-MCNC: 270 MG/L (ref 0–8.2)
CV ECHO LV RWT: 0.37 CM
CV ECHO LV RWT: 0.44 CM
D DIMER PPP IA.FEU-MCNC: 0.98 MG/L FEU
D DIMER PPP IA.FEU-MCNC: 3.48 MG/L FEU
D DIMER PPP IA.FEU-MCNC: 5.18 MG/L FEU
D DIMER PPP IA.FEU-MCNC: 6.18 MG/L FEU
DELSYS: ABNORMAL
DIFFERENTIAL METHOD: ABNORMAL
DISPENSE STATUS: NORMAL
DOP CALC AO PEAK VEL: 1.38 M/S
DOP CALC AO PEAK VEL: 1.53 M/S
DOP CALC AO VTI: 17.77 CM
DOP CALC AO VTI: 24.98 CM
DOP CALC LVOT AREA: 3.6 CM2
DOP CALC LVOT AREA: 4 CM2
DOP CALC LVOT DIAMETER: 2.15 CM
DOP CALC LVOT DIAMETER: 2.25 CM
DOP CALC LVOT PEAK VEL: 1.05 M/S
DOP CALC LVOT PEAK VEL: 1.07 M/S
DOP CALC LVOT STROKE VOLUME: 59.11 CM3
DOP CALC LVOT STROKE VOLUME: 67.64 CM3
DOP CALCLVOT PEAK VEL VTI: 16.29 CM
DOP CALCLVOT PEAK VEL VTI: 17.02 CM
E WAVE DECELERATION TIME: 146.47 MSEC
E/A RATIO: 0.77
E/E' RATIO: 6.84 M/S
ECHO LV POSTERIOR WALL: 0.95 CM (ref 0.6–1.1)
ECHO LV POSTERIOR WALL: 0.95 CM (ref 0.6–1.1)
ENTEROVIRUS: NOT DETECTED
EOSINOPHIL # BLD AUTO: 0 K/UL (ref 0–0.5)
EOSINOPHIL # BLD AUTO: 0.1 K/UL (ref 0–0.5)
EOSINOPHIL # BLD AUTO: 0.2 K/UL (ref 0–0.5)
EOSINOPHIL # BLD AUTO: 0.3 K/UL (ref 0–0.5)
EOSINOPHIL # BLD AUTO: 0.4 K/UL (ref 0–0.5)
EOSINOPHIL # BLD AUTO: 0.5 K/UL (ref 0–0.5)
EOSINOPHIL # BLD AUTO: 0.7 K/UL (ref 0–0.5)
EOSINOPHIL # BLD AUTO: 0.8 K/UL (ref 0–0.5)
EOSINOPHIL # BLD AUTO: 1 K/UL (ref 0–0.5)
EOSINOPHIL NFR BLD: 0 % (ref 0–8)
EOSINOPHIL NFR BLD: 0 % (ref 0–8)
EOSINOPHIL NFR BLD: 0.1 % (ref 0–8)
EOSINOPHIL NFR BLD: 0.1 % (ref 0–8)
EOSINOPHIL NFR BLD: 0.2 % (ref 0–8)
EOSINOPHIL NFR BLD: 0.3 % (ref 0–8)
EOSINOPHIL NFR BLD: 0.4 % (ref 0–8)
EOSINOPHIL NFR BLD: 0.4 % (ref 0–8)
EOSINOPHIL NFR BLD: 0.7 % (ref 0–8)
EOSINOPHIL NFR BLD: 0.9 % (ref 0–8)
EOSINOPHIL NFR BLD: 1 % (ref 0–8)
EOSINOPHIL NFR BLD: 1.1 % (ref 0–8)
EOSINOPHIL NFR BLD: 1.2 % (ref 0–8)
EOSINOPHIL NFR BLD: 1.2 % (ref 0–8)
EOSINOPHIL NFR BLD: 1.3 % (ref 0–8)
EOSINOPHIL NFR BLD: 1.4 % (ref 0–8)
EOSINOPHIL NFR BLD: 1.4 % (ref 0–8)
EOSINOPHIL NFR BLD: 1.5 % (ref 0–8)
EOSINOPHIL NFR BLD: 1.6 % (ref 0–8)
EOSINOPHIL NFR BLD: 1.6 % (ref 0–8)
EOSINOPHIL NFR BLD: 1.7 % (ref 0–8)
EOSINOPHIL NFR BLD: 12 % (ref 0–8)
EOSINOPHIL NFR BLD: 2.2 % (ref 0–8)
EOSINOPHIL NFR BLD: 2.4 % (ref 0–8)
EOSINOPHIL NFR BLD: 2.5 % (ref 0–8)
EOSINOPHIL NFR BLD: 2.6 % (ref 0–8)
EOSINOPHIL NFR BLD: 2.6 % (ref 0–8)
EOSINOPHIL NFR BLD: 2.7 % (ref 0–8)
EOSINOPHIL NFR BLD: 2.7 % (ref 0–8)
EOSINOPHIL NFR BLD: 2.8 % (ref 0–8)
EOSINOPHIL NFR BLD: 3.1 % (ref 0–8)
EOSINOPHIL NFR BLD: 3.2 % (ref 0–8)
EOSINOPHIL NFR BLD: 3.4 % (ref 0–8)
EOSINOPHIL NFR BLD: 3.7 % (ref 0–8)
EOSINOPHIL NFR BLD: 3.8 % (ref 0–8)
EOSINOPHIL NFR BLD: 3.8 % (ref 0–8)
EOSINOPHIL NFR BLD: 3.9 % (ref 0–8)
EOSINOPHIL NFR BLD: 3.9 % (ref 0–8)
EOSINOPHIL NFR BLD: 4 % (ref 0–8)
EOSINOPHIL NFR BLD: 4.1 % (ref 0–8)
EOSINOPHIL NFR BLD: 4.4 % (ref 0–8)
EOSINOPHIL NFR BLD: 4.4 % (ref 0–8)
EOSINOPHIL NFR BLD: 4.8 % (ref 0–8)
EOSINOPHIL NFR BLD: 5 % (ref 0–8)
EOSINOPHIL NFR BLD: 5.1 % (ref 0–8)
EOSINOPHIL NFR BLD: 5.1 % (ref 0–8)
EOSINOPHIL NFR BLD: 5.4 % (ref 0–8)
EOSINOPHIL NFR BLD: 5.6 % (ref 0–8)
EOSINOPHIL NFR BLD: 5.9 % (ref 0–8)
EOSINOPHIL NFR BLD: 6.1 % (ref 0–8)
EOSINOPHIL NFR BLD: 6.2 % (ref 0–8)
EOSINOPHIL NFR BLD: 6.7 % (ref 0–8)
EOSINOPHIL NFR BLD: 7.4 % (ref 0–8)
EOSINOPHIL NFR BLD: 8.3 % (ref 0–8)
EOSINOPHIL NFR BLD: 8.4 % (ref 0–8)
EOSINOPHIL NFR CSF MANUAL: 1 %
ERYTHROCYTE [DISTWIDTH] IN BLOOD BY AUTOMATED COUNT: 15.6 % (ref 11.5–14.5)
ERYTHROCYTE [DISTWIDTH] IN BLOOD BY AUTOMATED COUNT: 15.6 % (ref 11.5–14.5)
ERYTHROCYTE [DISTWIDTH] IN BLOOD BY AUTOMATED COUNT: 15.8 % (ref 11.5–14.5)
ERYTHROCYTE [DISTWIDTH] IN BLOOD BY AUTOMATED COUNT: 15.9 % (ref 11.5–14.5)
ERYTHROCYTE [DISTWIDTH] IN BLOOD BY AUTOMATED COUNT: 16 % (ref 11.5–14.5)
ERYTHROCYTE [DISTWIDTH] IN BLOOD BY AUTOMATED COUNT: 16.1 % (ref 11.5–14.5)
ERYTHROCYTE [DISTWIDTH] IN BLOOD BY AUTOMATED COUNT: 16.2 % (ref 11.5–14.5)
ERYTHROCYTE [DISTWIDTH] IN BLOOD BY AUTOMATED COUNT: 16.3 % (ref 11.5–14.5)
ERYTHROCYTE [DISTWIDTH] IN BLOOD BY AUTOMATED COUNT: 16.4 % (ref 11.5–14.5)
ERYTHROCYTE [DISTWIDTH] IN BLOOD BY AUTOMATED COUNT: 16.5 % (ref 11.5–14.5)
ERYTHROCYTE [DISTWIDTH] IN BLOOD BY AUTOMATED COUNT: 16.6 % (ref 11.5–14.5)
ERYTHROCYTE [DISTWIDTH] IN BLOOD BY AUTOMATED COUNT: 16.7 % (ref 11.5–14.5)
ERYTHROCYTE [DISTWIDTH] IN BLOOD BY AUTOMATED COUNT: 16.8 % (ref 11.5–14.5)
ERYTHROCYTE [DISTWIDTH] IN BLOOD BY AUTOMATED COUNT: 16.8 % (ref 11.5–14.5)
ERYTHROCYTE [DISTWIDTH] IN BLOOD BY AUTOMATED COUNT: 16.9 % (ref 11.5–14.5)
ERYTHROCYTE [DISTWIDTH] IN BLOOD BY AUTOMATED COUNT: 17.2 % (ref 11.5–14.5)
ERYTHROCYTE [DISTWIDTH] IN BLOOD BY AUTOMATED COUNT: 17.3 % (ref 11.5–14.5)
ERYTHROCYTE [DISTWIDTH] IN BLOOD BY AUTOMATED COUNT: 17.4 % (ref 11.5–14.5)
ERYTHROCYTE [DISTWIDTH] IN BLOOD BY AUTOMATED COUNT: 17.6 % (ref 11.5–14.5)
ERYTHROCYTE [DISTWIDTH] IN BLOOD BY AUTOMATED COUNT: 17.6 % (ref 11.5–14.5)
ERYTHROCYTE [DISTWIDTH] IN BLOOD BY AUTOMATED COUNT: 17.7 % (ref 11.5–14.5)
ERYTHROCYTE [DISTWIDTH] IN BLOOD BY AUTOMATED COUNT: 17.8 % (ref 11.5–14.5)
ERYTHROCYTE [DISTWIDTH] IN BLOOD BY AUTOMATED COUNT: 18.3 % (ref 11.5–14.5)
ERYTHROCYTE [DISTWIDTH] IN BLOOD BY AUTOMATED COUNT: 18.7 % (ref 11.5–14.5)
ERYTHROCYTE [DISTWIDTH] IN BLOOD BY AUTOMATED COUNT: 19.1 % (ref 11.5–14.5)
ERYTHROCYTE [DISTWIDTH] IN BLOOD BY AUTOMATED COUNT: 19.5 % (ref 11.5–14.5)
ERYTHROCYTE [SEDIMENTATION RATE] IN BLOOD BY WESTERGREN METHOD: 104 MM/HR (ref 0–23)
ERYTHROCYTE [SEDIMENTATION RATE] IN BLOOD BY WESTERGREN METHOD: 12 MM/H
ERYTHROCYTE [SEDIMENTATION RATE] IN BLOOD BY WESTERGREN METHOD: 13 MM/H
ERYTHROCYTE [SEDIMENTATION RATE] IN BLOOD BY WESTERGREN METHOD: 18 MM/H
EST. GFR  (AFRICAN AMERICAN): 10.3 ML/MIN/1.73 M^2
EST. GFR  (AFRICAN AMERICAN): 10.5 ML/MIN/1.73 M^2
EST. GFR  (AFRICAN AMERICAN): 10.7 ML/MIN/1.73 M^2
EST. GFR  (AFRICAN AMERICAN): 11 ML/MIN/1.73 M^2
EST. GFR  (AFRICAN AMERICAN): 11.9 ML/MIN/1.73 M^2
EST. GFR  (AFRICAN AMERICAN): 12.1 ML/MIN/1.73 M^2
EST. GFR  (AFRICAN AMERICAN): 12.4 ML/MIN/1.73 M^2
EST. GFR  (AFRICAN AMERICAN): 12.4 ML/MIN/1.73 M^2
EST. GFR  (AFRICAN AMERICAN): 12.7 ML/MIN/1.73 M^2
EST. GFR  (AFRICAN AMERICAN): 13 ML/MIN/1.73 M^2
EST. GFR  (AFRICAN AMERICAN): 13 ML/MIN/1.73 M^2
EST. GFR  (AFRICAN AMERICAN): 13.3 ML/MIN/1.73 M^2
EST. GFR  (AFRICAN AMERICAN): 13.6 ML/MIN/1.73 M^2
EST. GFR  (AFRICAN AMERICAN): 13.9 ML/MIN/1.73 M^2
EST. GFR  (AFRICAN AMERICAN): 14.3 ML/MIN/1.73 M^2
EST. GFR  (AFRICAN AMERICAN): 14.6 ML/MIN/1.73 M^2
EST. GFR  (AFRICAN AMERICAN): 15.8 ML/MIN/1.73 M^2
EST. GFR  (AFRICAN AMERICAN): 16.3 ML/MIN/1.73 M^2
EST. GFR  (AFRICAN AMERICAN): 16.7 ML/MIN/1.73 M^2
EST. GFR  (AFRICAN AMERICAN): 17.2 ML/MIN/1.73 M^2
EST. GFR  (AFRICAN AMERICAN): 17.7 ML/MIN/1.73 M^2
EST. GFR  (AFRICAN AMERICAN): 19.3 ML/MIN/1.73 M^2
EST. GFR  (AFRICAN AMERICAN): 19.4 ML/MIN/1.73 M^2
EST. GFR  (AFRICAN AMERICAN): 20.6 ML/MIN/1.73 M^2
EST. GFR  (AFRICAN AMERICAN): 21.4 ML/MIN/1.73 M^2
EST. GFR  (AFRICAN AMERICAN): 23.9 ML/MIN/1.73 M^2
EST. GFR  (AFRICAN AMERICAN): 24.7 ML/MIN/1.73 M^2
EST. GFR  (AFRICAN AMERICAN): 24.8 ML/MIN/1.73 M^2
EST. GFR  (AFRICAN AMERICAN): 26.9 ML/MIN/1.73 M^2
EST. GFR  (AFRICAN AMERICAN): 27.9 ML/MIN/1.73 M^2
EST. GFR  (AFRICAN AMERICAN): 30.5 ML/MIN/1.73 M^2
EST. GFR  (AFRICAN AMERICAN): 32.2 ML/MIN/1.73 M^2
EST. GFR  (AFRICAN AMERICAN): 35.4 ML/MIN/1.73 M^2
EST. GFR  (AFRICAN AMERICAN): 4.9 ML/MIN/1.73 M^2
EST. GFR  (AFRICAN AMERICAN): 5.5 ML/MIN/1.73 M^2
EST. GFR  (AFRICAN AMERICAN): 5.7 ML/MIN/1.73 M^2
EST. GFR  (AFRICAN AMERICAN): 5.7 ML/MIN/1.73 M^2
EST. GFR  (AFRICAN AMERICAN): 6 ML/MIN/1.73 M^2
EST. GFR  (AFRICAN AMERICAN): 6.2 ML/MIN/1.73 M^2
EST. GFR  (AFRICAN AMERICAN): 6.5 ML/MIN/1.73 M^2
EST. GFR  (AFRICAN AMERICAN): 6.6 ML/MIN/1.73 M^2
EST. GFR  (AFRICAN AMERICAN): 6.8 ML/MIN/1.73 M^2
EST. GFR  (AFRICAN AMERICAN): 6.8 ML/MIN/1.73 M^2
EST. GFR  (AFRICAN AMERICAN): 7 ML/MIN/1.73 M^2
EST. GFR  (AFRICAN AMERICAN): 7 ML/MIN/1.73 M^2
EST. GFR  (AFRICAN AMERICAN): 7.1 ML/MIN/1.73 M^2
EST. GFR  (AFRICAN AMERICAN): 7.2 ML/MIN/1.73 M^2
EST. GFR  (AFRICAN AMERICAN): 7.4 ML/MIN/1.73 M^2
EST. GFR  (AFRICAN AMERICAN): 7.8 ML/MIN/1.73 M^2
EST. GFR  (AFRICAN AMERICAN): 7.9 ML/MIN/1.73 M^2
EST. GFR  (AFRICAN AMERICAN): 8 ML/MIN/1.73 M^2
EST. GFR  (AFRICAN AMERICAN): 8.4 ML/MIN/1.73 M^2
EST. GFR  (AFRICAN AMERICAN): 8.5 ML/MIN/1.73 M^2
EST. GFR  (AFRICAN AMERICAN): 8.7 ML/MIN/1.73 M^2
EST. GFR  (AFRICAN AMERICAN): 8.7 ML/MIN/1.73 M^2
EST. GFR  (AFRICAN AMERICAN): 9 ML/MIN/1.73 M^2
EST. GFR  (AFRICAN AMERICAN): 9 ML/MIN/1.73 M^2
EST. GFR  (AFRICAN AMERICAN): 9.1 ML/MIN/1.73 M^2
EST. GFR  (AFRICAN AMERICAN): 9.3 ML/MIN/1.73 M^2
EST. GFR  (AFRICAN AMERICAN): 9.6 ML/MIN/1.73 M^2
EST. GFR  (AFRICAN AMERICAN): 9.8 ML/MIN/1.73 M^2
EST. GFR  (AFRICAN AMERICAN): 9.8 ML/MIN/1.73 M^2
EST. GFR  (NON AFRICAN AMERICAN): 10.3 ML/MIN/1.73 M^2
EST. GFR  (NON AFRICAN AMERICAN): 10.5 ML/MIN/1.73 M^2
EST. GFR  (NON AFRICAN AMERICAN): 10.7 ML/MIN/1.73 M^2
EST. GFR  (NON AFRICAN AMERICAN): 10.7 ML/MIN/1.73 M^2
EST. GFR  (NON AFRICAN AMERICAN): 11 ML/MIN/1.73 M^2
EST. GFR  (NON AFRICAN AMERICAN): 11 ML/MIN/1.73 M^2
EST. GFR  (NON AFRICAN AMERICAN): 11.2 ML/MIN/1.73 M^2
EST. GFR  (NON AFRICAN AMERICAN): 11.5 ML/MIN/1.73 M^2
EST. GFR  (NON AFRICAN AMERICAN): 11.8 ML/MIN/1.73 M^2
EST. GFR  (NON AFRICAN AMERICAN): 12.1 ML/MIN/1.73 M^2
EST. GFR  (NON AFRICAN AMERICAN): 12.3 ML/MIN/1.73 M^2
EST. GFR  (NON AFRICAN AMERICAN): 12.7 ML/MIN/1.73 M^2
EST. GFR  (NON AFRICAN AMERICAN): 13.7 ML/MIN/1.73 M^2
EST. GFR  (NON AFRICAN AMERICAN): 14.1 ML/MIN/1.73 M^2
EST. GFR  (NON AFRICAN AMERICAN): 14.5 ML/MIN/1.73 M^2
EST. GFR  (NON AFRICAN AMERICAN): 14.9 ML/MIN/1.73 M^2
EST. GFR  (NON AFRICAN AMERICAN): 15.3 ML/MIN/1.73 M^2
EST. GFR  (NON AFRICAN AMERICAN): 16.7 ML/MIN/1.73 M^2
EST. GFR  (NON AFRICAN AMERICAN): 16.8 ML/MIN/1.73 M^2
EST. GFR  (NON AFRICAN AMERICAN): 17.8 ML/MIN/1.73 M^2
EST. GFR  (NON AFRICAN AMERICAN): 18.5 ML/MIN/1.73 M^2
EST. GFR  (NON AFRICAN AMERICAN): 20.7 ML/MIN/1.73 M^2
EST. GFR  (NON AFRICAN AMERICAN): 21.3 ML/MIN/1.73 M^2
EST. GFR  (NON AFRICAN AMERICAN): 21.5 ML/MIN/1.73 M^2
EST. GFR  (NON AFRICAN AMERICAN): 23.3 ML/MIN/1.73 M^2
EST. GFR  (NON AFRICAN AMERICAN): 24.1 ML/MIN/1.73 M^2
EST. GFR  (NON AFRICAN AMERICAN): 26.4 ML/MIN/1.73 M^2
EST. GFR  (NON AFRICAN AMERICAN): 27.9 ML/MIN/1.73 M^2
EST. GFR  (NON AFRICAN AMERICAN): 30.6 ML/MIN/1.73 M^2
EST. GFR  (NON AFRICAN AMERICAN): 4.2 ML/MIN/1.73 M^2
EST. GFR  (NON AFRICAN AMERICAN): 4.8 ML/MIN/1.73 M^2
EST. GFR  (NON AFRICAN AMERICAN): 4.9 ML/MIN/1.73 M^2
EST. GFR  (NON AFRICAN AMERICAN): 5 ML/MIN/1.73 M^2
EST. GFR  (NON AFRICAN AMERICAN): 5.2 ML/MIN/1.73 M^2
EST. GFR  (NON AFRICAN AMERICAN): 5.3 ML/MIN/1.73 M^2
EST. GFR  (NON AFRICAN AMERICAN): 5.6 ML/MIN/1.73 M^2
EST. GFR  (NON AFRICAN AMERICAN): 5.7 ML/MIN/1.73 M^2
EST. GFR  (NON AFRICAN AMERICAN): 5.8 ML/MIN/1.73 M^2
EST. GFR  (NON AFRICAN AMERICAN): 5.8 ML/MIN/1.73 M^2
EST. GFR  (NON AFRICAN AMERICAN): 6.1 ML/MIN/1.73 M^2
EST. GFR  (NON AFRICAN AMERICAN): 6.1 ML/MIN/1.73 M^2
EST. GFR  (NON AFRICAN AMERICAN): 6.2 ML/MIN/1.73 M^2
EST. GFR  (NON AFRICAN AMERICAN): 6.3 ML/MIN/1.73 M^2
EST. GFR  (NON AFRICAN AMERICAN): 6.4 ML/MIN/1.73 M^2
EST. GFR  (NON AFRICAN AMERICAN): 6.7 ML/MIN/1.73 M^2
EST. GFR  (NON AFRICAN AMERICAN): 6.8 ML/MIN/1.73 M^2
EST. GFR  (NON AFRICAN AMERICAN): 7 ML/MIN/1.73 M^2
EST. GFR  (NON AFRICAN AMERICAN): 7.3 ML/MIN/1.73 M^2
EST. GFR  (NON AFRICAN AMERICAN): 7.4 ML/MIN/1.73 M^2
EST. GFR  (NON AFRICAN AMERICAN): 7.5 ML/MIN/1.73 M^2
EST. GFR  (NON AFRICAN AMERICAN): 7.5 ML/MIN/1.73 M^2
EST. GFR  (NON AFRICAN AMERICAN): 7.8 ML/MIN/1.73 M^2
EST. GFR  (NON AFRICAN AMERICAN): 7.8 ML/MIN/1.73 M^2
EST. GFR  (NON AFRICAN AMERICAN): 7.9 ML/MIN/1.73 M^2
EST. GFR  (NON AFRICAN AMERICAN): 8 ML/MIN/1.73 M^2
EST. GFR  (NON AFRICAN AMERICAN): 8.3 ML/MIN/1.73 M^2
EST. GFR  (NON AFRICAN AMERICAN): 8.5 ML/MIN/1.73 M^2
EST. GFR  (NON AFRICAN AMERICAN): 8.5 ML/MIN/1.73 M^2
EST. GFR  (NON AFRICAN AMERICAN): 8.9 ML/MIN/1.73 M^2
EST. GFR  (NON AFRICAN AMERICAN): 9.1 ML/MIN/1.73 M^2
EST. GFR  (NON AFRICAN AMERICAN): 9.3 ML/MIN/1.73 M^2
EST. GFR  (NON AFRICAN AMERICAN): 9.5 ML/MIN/1.73 M^2
ESTIMATED AVG GLUCOSE: 103 MG/DL (ref 68–131)
ESTIMATED AVG GLUCOSE: 88 MG/DL (ref 68–131)
ETHANOL SERPL-MCNC: <10 MG/DL
FERRITIN SERPL-MCNC: 1762 NG/ML (ref 20–300)
FERRITIN SERPL-MCNC: ABNORMAL NG/ML (ref 20–300)
FIBRINOGEN PPP-MCNC: 665 MG/DL (ref 182–366)
FINAL PATHOLOGIC DIAGNOSIS: NORMAL
FIO2: 21
FIO2: 30
FIO2: 30
FIO2: 35
FIO2: 50
FRACTIONAL SHORTENING: 26 % (ref 28–44)
FRACTIONAL SHORTENING: 31 % (ref 28–44)
FUNGUS SPEC CULT: NORMAL
GLUCOSE CSF-MCNC: 73 MG/DL (ref 40–70)
GLUCOSE SERPL-MCNC: 106 MG/DL (ref 70–110)
GLUCOSE SERPL-MCNC: 108 MG/DL (ref 70–110)
GLUCOSE SERPL-MCNC: 112 MG/DL (ref 70–110)
GLUCOSE SERPL-MCNC: 115 MG/DL (ref 70–110)
GLUCOSE SERPL-MCNC: 117 MG/DL (ref 70–110)
GLUCOSE SERPL-MCNC: 119 MG/DL (ref 70–110)
GLUCOSE SERPL-MCNC: 123 MG/DL (ref 70–110)
GLUCOSE SERPL-MCNC: 130 MG/DL (ref 70–110)
GLUCOSE SERPL-MCNC: 135 MG/DL (ref 70–110)
GLUCOSE SERPL-MCNC: 136 MG/DL (ref 70–110)
GLUCOSE SERPL-MCNC: 137 MG/DL (ref 70–110)
GLUCOSE SERPL-MCNC: 141 MG/DL (ref 70–110)
GLUCOSE SERPL-MCNC: 143 MG/DL (ref 70–110)
GLUCOSE SERPL-MCNC: 146 MG/DL (ref 70–110)
GLUCOSE SERPL-MCNC: 147 MG/DL (ref 70–110)
GLUCOSE SERPL-MCNC: 148 MG/DL (ref 70–110)
GLUCOSE SERPL-MCNC: 148 MG/DL (ref 70–110)
GLUCOSE SERPL-MCNC: 149 MG/DL (ref 70–110)
GLUCOSE SERPL-MCNC: 152 MG/DL (ref 70–110)
GLUCOSE SERPL-MCNC: 154 MG/DL (ref 70–110)
GLUCOSE SERPL-MCNC: 162 MG/DL (ref 70–110)
GLUCOSE SERPL-MCNC: 162 MG/DL (ref 70–110)
GLUCOSE SERPL-MCNC: 165 MG/DL (ref 70–110)
GLUCOSE SERPL-MCNC: 168 MG/DL (ref 70–110)
GLUCOSE SERPL-MCNC: 169 MG/DL (ref 70–110)
GLUCOSE SERPL-MCNC: 175 MG/DL (ref 70–110)
GLUCOSE SERPL-MCNC: 177 MG/DL (ref 70–110)
GLUCOSE SERPL-MCNC: 181 MG/DL (ref 70–110)
GLUCOSE SERPL-MCNC: 182 MG/DL (ref 70–110)
GLUCOSE SERPL-MCNC: 184 MG/DL (ref 70–110)
GLUCOSE SERPL-MCNC: 191 MG/DL (ref 70–110)
GLUCOSE SERPL-MCNC: 192 MG/DL (ref 70–110)
GLUCOSE SERPL-MCNC: 194 MG/DL (ref 70–110)
GLUCOSE SERPL-MCNC: 195 MG/DL (ref 70–110)
GLUCOSE SERPL-MCNC: 203 MG/DL (ref 70–110)
GLUCOSE SERPL-MCNC: 203 MG/DL (ref 70–110)
GLUCOSE SERPL-MCNC: 204 MG/DL (ref 70–110)
GLUCOSE SERPL-MCNC: 219 MG/DL (ref 70–110)
GLUCOSE SERPL-MCNC: 230 MG/DL (ref 70–110)
GLUCOSE SERPL-MCNC: 234 MG/DL (ref 70–110)
GLUCOSE SERPL-MCNC: 243 MG/DL (ref 70–110)
GLUCOSE SERPL-MCNC: 251 MG/DL (ref 70–110)
GLUCOSE SERPL-MCNC: 296 MG/DL (ref 70–110)
GLUCOSE SERPL-MCNC: 301 MG/DL (ref 70–110)
GLUCOSE SERPL-MCNC: 303 MG/DL (ref 70–110)
GLUCOSE SERPL-MCNC: 322 MG/DL (ref 70–110)
GLUCOSE SERPL-MCNC: 375 MG/DL (ref 70–110)
GLUCOSE SERPL-MCNC: 62 MG/DL (ref 70–110)
GLUCOSE SERPL-MCNC: 64 MG/DL (ref 70–110)
GLUCOSE SERPL-MCNC: 67 MG/DL (ref 70–110)
GLUCOSE SERPL-MCNC: 71 MG/DL (ref 70–110)
GLUCOSE SERPL-MCNC: 72 MG/DL (ref 70–110)
GLUCOSE SERPL-MCNC: 78 MG/DL (ref 70–110)
GLUCOSE SERPL-MCNC: 80 MG/DL (ref 70–110)
GLUCOSE SERPL-MCNC: 81 MG/DL (ref 70–110)
GLUCOSE SERPL-MCNC: 83 MG/DL (ref 70–110)
GLUCOSE SERPL-MCNC: 84 MG/DL (ref 70–110)
GLUCOSE SERPL-MCNC: 91 MG/DL (ref 70–110)
GLUCOSE SERPL-MCNC: 92 MG/DL (ref 70–110)
GLUCOSE SERPL-MCNC: 94 MG/DL (ref 70–110)
GLUCOSE SERPL-MCNC: 96 MG/DL (ref 70–110)
GLUCOSE SERPL-MCNC: 98 MG/DL (ref 70–110)
GLUCOSE SERPL-MCNC: 99 MG/DL (ref 70–110)
GLUCOSE UR QL STRIP: NEGATIVE
GRAM STN SPEC: ABNORMAL
GRAM STN SPEC: NORMAL
GROSS: NORMAL
HAV IGM SERPL QL IA: NEGATIVE
HBA1C MFR BLD HPLC: 4.7 % (ref 4–5.6)
HBA1C MFR BLD HPLC: 5.2 % (ref 4–5.6)
HBV CORE IGM SERPL QL IA: NEGATIVE
HBV SURFACE AG SERPL QL IA: NEGATIVE
HCO3 UR-SCNC: 21.2 MMOL/L (ref 24–28)
HCO3 UR-SCNC: 22.4 MMOL/L (ref 24–28)
HCO3 UR-SCNC: 25.1 MMOL/L (ref 24–28)
HCO3 UR-SCNC: 25.8 MMOL/L (ref 24–28)
HCO3 UR-SCNC: 30.1 MMOL/L (ref 24–28)
HCO3 UR-SCNC: 36.2 MMOL/L (ref 24–28)
HCO3 UR-SCNC: 37 MMOL/L (ref 24–28)
HCO3 UR-SCNC: 37.8 MMOL/L (ref 24–28)
HCO3 UR-SCNC: 40.1 MMOL/L (ref 24–28)
HCT VFR BLD AUTO: 21.5 % (ref 40–54)
HCT VFR BLD AUTO: 22.7 % (ref 40–54)
HCT VFR BLD AUTO: 23.5 % (ref 40–54)
HCT VFR BLD AUTO: 23.9 % (ref 40–54)
HCT VFR BLD AUTO: 24 % (ref 40–54)
HCT VFR BLD AUTO: 24.2 % (ref 40–54)
HCT VFR BLD AUTO: 24.3 % (ref 40–54)
HCT VFR BLD AUTO: 24.3 % (ref 40–54)
HCT VFR BLD AUTO: 24.4 % (ref 40–54)
HCT VFR BLD AUTO: 24.7 % (ref 40–54)
HCT VFR BLD AUTO: 24.8 % (ref 40–54)
HCT VFR BLD AUTO: 24.9 % (ref 40–54)
HCT VFR BLD AUTO: 25.7 % (ref 40–54)
HCT VFR BLD AUTO: 25.9 % (ref 40–54)
HCT VFR BLD AUTO: 26.1 % (ref 40–54)
HCT VFR BLD AUTO: 26.2 % (ref 40–54)
HCT VFR BLD AUTO: 26.3 % (ref 40–54)
HCT VFR BLD AUTO: 26.4 % (ref 40–54)
HCT VFR BLD AUTO: 26.5 % (ref 40–54)
HCT VFR BLD AUTO: 26.6 % (ref 40–54)
HCT VFR BLD AUTO: 26.7 % (ref 40–54)
HCT VFR BLD AUTO: 27.4 % (ref 40–54)
HCT VFR BLD AUTO: 27.6 % (ref 40–54)
HCT VFR BLD AUTO: 27.6 % (ref 40–54)
HCT VFR BLD AUTO: 28 % (ref 40–54)
HCT VFR BLD AUTO: 28.8 % (ref 40–54)
HCT VFR BLD AUTO: 29.9 % (ref 40–54)
HCT VFR BLD AUTO: 30.2 % (ref 40–54)
HCT VFR BLD AUTO: 30.3 % (ref 40–54)
HCT VFR BLD AUTO: 30.7 % (ref 40–54)
HCT VFR BLD AUTO: 30.8 % (ref 40–54)
HCT VFR BLD AUTO: 32 % (ref 40–54)
HCT VFR BLD AUTO: 32.1 % (ref 40–54)
HCT VFR BLD AUTO: 32.2 % (ref 40–54)
HCT VFR BLD AUTO: 32.4 % (ref 40–54)
HCT VFR BLD AUTO: 33.2 % (ref 40–54)
HCT VFR BLD AUTO: 33.5 % (ref 40–54)
HCT VFR BLD AUTO: 34 % (ref 40–54)
HCT VFR BLD AUTO: 34 % (ref 40–54)
HCT VFR BLD AUTO: 34.1 % (ref 40–54)
HCT VFR BLD AUTO: 34.1 % (ref 40–54)
HCT VFR BLD AUTO: 34.2 % (ref 40–54)
HCT VFR BLD AUTO: 34.2 % (ref 40–54)
HCT VFR BLD AUTO: 34.3 % (ref 40–54)
HCT VFR BLD AUTO: 35.2 % (ref 40–54)
HCT VFR BLD AUTO: 35.3 % (ref 40–54)
HCT VFR BLD AUTO: 35.6 % (ref 40–54)
HCT VFR BLD AUTO: 35.7 % (ref 40–54)
HCT VFR BLD AUTO: 36 % (ref 40–54)
HCT VFR BLD AUTO: 36 % (ref 40–54)
HCT VFR BLD AUTO: 36.1 % (ref 40–54)
HCT VFR BLD AUTO: 36.2 % (ref 40–54)
HCT VFR BLD AUTO: 36.3 % (ref 40–54)
HCT VFR BLD AUTO: 36.3 % (ref 40–54)
HCT VFR BLD AUTO: 36.4 % (ref 40–54)
HCT VFR BLD AUTO: 36.6 % (ref 40–54)
HCT VFR BLD AUTO: 36.7 % (ref 40–54)
HCT VFR BLD AUTO: 36.9 % (ref 40–54)
HCT VFR BLD AUTO: 37 % (ref 40–54)
HCT VFR BLD AUTO: 37.2 % (ref 40–54)
HCT VFR BLD AUTO: 37.5 % (ref 40–54)
HCT VFR BLD AUTO: 37.9 % (ref 40–54)
HCT VFR BLD AUTO: 37.9 % (ref 40–54)
HCT VFR BLD AUTO: 38 % (ref 40–54)
HCT VFR BLD AUTO: 38.2 % (ref 40–54)
HCT VFR BLD AUTO: 38.9 % (ref 40–54)
HCT VFR BLD AUTO: 39.4 % (ref 40–54)
HCT VFR BLD AUTO: 39.6 % (ref 40–54)
HCT VFR BLD AUTO: 40 % (ref 40–54)
HCT VFR BLD AUTO: 40.1 % (ref 40–54)
HCT VFR BLD AUTO: 41.1 % (ref 40–54)
HCT VFR BLD AUTO: 41.3 % (ref 40–54)
HCT VFR BLD AUTO: 41.4 % (ref 40–54)
HCT VFR BLD AUTO: 41.6 % (ref 40–54)
HCT VFR BLD AUTO: 41.8 % (ref 40–54)
HCT VFR BLD AUTO: 43.4 % (ref 40–54)
HCT VFR BLD AUTO: 43.5 % (ref 40–54)
HCT VFR BLD AUTO: 43.6 % (ref 40–54)
HCT VFR BLD AUTO: 43.7 % (ref 40–54)
HCT VFR BLD AUTO: 45.5 % (ref 40–54)
HCT VFR BLD AUTO: 45.7 % (ref 40–54)
HCT VFR BLD AUTO: 46.1 % (ref 40–54)
HCT VFR BLD AUTO: 47.3 % (ref 40–54)
HCT VFR BLD AUTO: 48.9 % (ref 40–54)
HCT VFR BLD CALC: 50 %PCV (ref 36–54)
HCV AB SERPL QL IA: POSITIVE
HGB BLD-MCNC: 10 G/DL (ref 14–18)
HGB BLD-MCNC: 10 G/DL (ref 14–18)
HGB BLD-MCNC: 10.1 G/DL (ref 14–18)
HGB BLD-MCNC: 10.2 G/DL (ref 14–18)
HGB BLD-MCNC: 10.4 G/DL (ref 14–18)
HGB BLD-MCNC: 10.5 G/DL (ref 14–18)
HGB BLD-MCNC: 10.6 G/DL (ref 14–18)
HGB BLD-MCNC: 10.6 G/DL (ref 14–18)
HGB BLD-MCNC: 10.7 G/DL (ref 14–18)
HGB BLD-MCNC: 10.7 G/DL (ref 14–18)
HGB BLD-MCNC: 11 G/DL (ref 14–18)
HGB BLD-MCNC: 11 G/DL (ref 14–18)
HGB BLD-MCNC: 11.1 G/DL (ref 14–18)
HGB BLD-MCNC: 11.2 G/DL (ref 14–18)
HGB BLD-MCNC: 11.3 G/DL (ref 14–18)
HGB BLD-MCNC: 11.4 G/DL (ref 14–18)
HGB BLD-MCNC: 11.5 G/DL (ref 14–18)
HGB BLD-MCNC: 11.5 G/DL (ref 14–18)
HGB BLD-MCNC: 11.7 G/DL (ref 14–18)
HGB BLD-MCNC: 11.9 G/DL (ref 14–18)
HGB BLD-MCNC: 12 G/DL (ref 14–18)
HGB BLD-MCNC: 12.1 G/DL (ref 14–18)
HGB BLD-MCNC: 12.3 G/DL (ref 14–18)
HGB BLD-MCNC: 12.3 G/DL (ref 14–18)
HGB BLD-MCNC: 12.4 G/DL (ref 14–18)
HGB BLD-MCNC: 12.5 G/DL (ref 14–18)
HGB BLD-MCNC: 12.5 G/DL (ref 14–18)
HGB BLD-MCNC: 12.7 G/DL (ref 14–18)
HGB BLD-MCNC: 12.8 G/DL (ref 14–18)
HGB BLD-MCNC: 12.8 G/DL (ref 14–18)
HGB BLD-MCNC: 12.9 G/DL (ref 14–18)
HGB BLD-MCNC: 12.9 G/DL (ref 14–18)
HGB BLD-MCNC: 13.1 G/DL (ref 14–18)
HGB BLD-MCNC: 13.5 G/DL (ref 14–18)
HGB BLD-MCNC: 13.6 G/DL (ref 14–18)
HGB BLD-MCNC: 13.7 G/DL (ref 14–18)
HGB BLD-MCNC: 13.8 G/DL (ref 14–18)
HGB BLD-MCNC: 13.9 G/DL (ref 14–18)
HGB BLD-MCNC: 14.2 G/DL (ref 14–18)
HGB BLD-MCNC: 14.3 G/DL (ref 14–18)
HGB BLD-MCNC: 14.8 G/DL (ref 14–18)
HGB BLD-MCNC: 15 G/DL (ref 14–18)
HGB BLD-MCNC: 15.3 G/DL (ref 14–18)
HGB BLD-MCNC: 6.5 G/DL (ref 14–18)
HGB BLD-MCNC: 7 G/DL (ref 14–18)
HGB BLD-MCNC: 7.2 G/DL (ref 14–18)
HGB BLD-MCNC: 7.3 G/DL (ref 14–18)
HGB BLD-MCNC: 7.3 G/DL (ref 14–18)
HGB BLD-MCNC: 7.4 G/DL (ref 14–18)
HGB BLD-MCNC: 7.5 G/DL (ref 14–18)
HGB BLD-MCNC: 7.5 G/DL (ref 14–18)
HGB BLD-MCNC: 7.6 G/DL (ref 14–18)
HGB BLD-MCNC: 7.7 G/DL (ref 14–18)
HGB BLD-MCNC: 7.7 G/DL (ref 14–18)
HGB BLD-MCNC: 7.9 G/DL (ref 14–18)
HGB BLD-MCNC: 8 G/DL (ref 14–18)
HGB BLD-MCNC: 8.1 G/DL (ref 14–18)
HGB BLD-MCNC: 8.1 G/DL (ref 14–18)
HGB BLD-MCNC: 8.2 G/DL (ref 14–18)
HGB BLD-MCNC: 8.2 G/DL (ref 14–18)
HGB BLD-MCNC: 8.4 G/DL (ref 14–18)
HGB BLD-MCNC: 8.5 G/DL (ref 14–18)
HGB BLD-MCNC: 8.6 G/DL (ref 14–18)
HGB BLD-MCNC: 8.9 G/DL (ref 14–18)
HGB BLD-MCNC: 9.1 G/DL (ref 14–18)
HGB BLD-MCNC: 9.2 G/DL (ref 14–18)
HGB BLD-MCNC: 9.4 G/DL (ref 14–18)
HGB BLD-MCNC: 9.5 G/DL (ref 14–18)
HGB BLD-MCNC: 9.5 G/DL (ref 14–18)
HGB BLD-MCNC: 9.6 G/DL (ref 14–18)
HGB BLD-MCNC: 9.8 G/DL (ref 14–18)
HGB UR QL STRIP: ABNORMAL
HSV1, PCR, CSF: NEGATIVE
HSV2, PCR, CSF: NEGATIVE
HUMAN BOCAVIRUS: NOT DETECTED
HUMAN CORONAVIRUS, COMMON COLD VIRUS: NOT DETECTED
HYALINE CASTS UR QL AUTO: 0 /LPF
HYPOCHROMIA BLD QL SMEAR: ABNORMAL
IMM GRANULOCYTES # BLD AUTO: 0.01 K/UL (ref 0–0.04)
IMM GRANULOCYTES # BLD AUTO: 0.01 K/UL (ref 0–0.04)
IMM GRANULOCYTES # BLD AUTO: 0.02 K/UL (ref 0–0.04)
IMM GRANULOCYTES # BLD AUTO: 0.03 K/UL (ref 0–0.04)
IMM GRANULOCYTES # BLD AUTO: 0.04 K/UL (ref 0–0.04)
IMM GRANULOCYTES # BLD AUTO: 0.05 K/UL (ref 0–0.04)
IMM GRANULOCYTES # BLD AUTO: 0.06 K/UL (ref 0–0.04)
IMM GRANULOCYTES # BLD AUTO: 0.06 K/UL (ref 0–0.04)
IMM GRANULOCYTES # BLD AUTO: 0.07 K/UL (ref 0–0.04)
IMM GRANULOCYTES # BLD AUTO: 0.08 K/UL (ref 0–0.04)
IMM GRANULOCYTES # BLD AUTO: 0.09 K/UL (ref 0–0.04)
IMM GRANULOCYTES # BLD AUTO: 0.1 K/UL (ref 0–0.04)
IMM GRANULOCYTES # BLD AUTO: 0.11 K/UL (ref 0–0.04)
IMM GRANULOCYTES # BLD AUTO: 0.12 K/UL (ref 0–0.04)
IMM GRANULOCYTES # BLD AUTO: 0.12 K/UL (ref 0–0.04)
IMM GRANULOCYTES # BLD AUTO: 0.13 K/UL (ref 0–0.04)
IMM GRANULOCYTES # BLD AUTO: 0.18 K/UL (ref 0–0.04)
IMM GRANULOCYTES # BLD AUTO: 0.19 K/UL (ref 0–0.04)
IMM GRANULOCYTES # BLD AUTO: 0.2 K/UL (ref 0–0.04)
IMM GRANULOCYTES # BLD AUTO: 0.2 K/UL (ref 0–0.04)
IMM GRANULOCYTES # BLD AUTO: ABNORMAL K/UL (ref 0–0.04)
IMM GRANULOCYTES # BLD AUTO: ABNORMAL K/UL (ref 0–0.04)
IMM GRANULOCYTES NFR BLD AUTO: 0.1 % (ref 0–0.5)
IMM GRANULOCYTES NFR BLD AUTO: 0.2 % (ref 0–0.5)
IMM GRANULOCYTES NFR BLD AUTO: 0.3 % (ref 0–0.5)
IMM GRANULOCYTES NFR BLD AUTO: 0.4 % (ref 0–0.5)
IMM GRANULOCYTES NFR BLD AUTO: 0.5 % (ref 0–0.5)
IMM GRANULOCYTES NFR BLD AUTO: 0.6 % (ref 0–0.5)
IMM GRANULOCYTES NFR BLD AUTO: 0.7 % (ref 0–0.5)
IMM GRANULOCYTES NFR BLD AUTO: 0.8 % (ref 0–0.5)
IMM GRANULOCYTES NFR BLD AUTO: 0.9 % (ref 0–0.5)
IMM GRANULOCYTES NFR BLD AUTO: 1 % (ref 0–0.5)
IMM GRANULOCYTES NFR BLD AUTO: 1.1 % (ref 0–0.5)
IMM GRANULOCYTES NFR BLD AUTO: 1.1 % (ref 0–0.5)
IMM GRANULOCYTES NFR BLD AUTO: 1.2 % (ref 0–0.5)
IMM GRANULOCYTES NFR BLD AUTO: 1.5 % (ref 0–0.5)
IMM GRANULOCYTES NFR BLD AUTO: 1.6 % (ref 0–0.5)
IMM GRANULOCYTES NFR BLD AUTO: 1.9 % (ref 0–0.5)
IMM GRANULOCYTES NFR BLD AUTO: 2.1 % (ref 0–0.5)
IMM GRANULOCYTES NFR BLD AUTO: ABNORMAL % (ref 0–0.5)
IMM GRANULOCYTES NFR BLD AUTO: ABNORMAL % (ref 0–0.5)
INFLUENZA A - H1N1-09: NOT DETECTED
INFLUENZA A, MOLECULAR: NEGATIVE
INFLUENZA A, MOLECULAR: NEGATIVE
INFLUENZA B, MOLECULAR: NEGATIVE
INFLUENZA B, MOLECULAR: NEGATIVE
INR PPP: 1.1 (ref 0.8–1.2)
INR PPP: 1.2 (ref 0.8–1.2)
INR PPP: 1.3 (ref 0.8–1.2)
INR PPP: 1.3 (ref 0.8–1.2)
INTERVENTRICULAR SEPTUM: 0.85 CM (ref 0.6–1.1)
INTERVENTRICULAR SEPTUM: 0.94 CM (ref 0.6–1.1)
IVRT: 79.92 MSEC
IVRT: 87.54 MSEC
KETONES UR QL STRIP: NEGATIVE
LA MAJOR: 5.69 CM
LA MAJOR: 5.94 CM
LA MINOR: 5.68 CM
LA MINOR: 5.94 CM
LA WIDTH: 4.37 CM
LA WIDTH: 4.38 CM
LACTATE SERPL-SCNC: 0.5 MMOL/L (ref 0.5–2.2)
LACTATE SERPL-SCNC: 1 MMOL/L (ref 0.5–2.2)
LACTATE SERPL-SCNC: 1.1 MMOL/L (ref 0.5–2.2)
LACTATE SERPL-SCNC: 1.1 MMOL/L (ref 0.5–2.2)
LACTATE SERPL-SCNC: 1.2 MMOL/L (ref 0.5–2.2)
LACTATE SERPL-SCNC: 1.3 MMOL/L (ref 0.5–2.2)
LACTATE SERPL-SCNC: 1.5 MMOL/L (ref 0.5–2.2)
LACTATE SERPL-SCNC: 1.6 MMOL/L (ref 0.5–2.2)
LACTATE SERPL-SCNC: 1.6 MMOL/L (ref 0.5–2.2)
LACTATE SERPL-SCNC: 1.9 MMOL/L (ref 0.5–2.2)
LACTATE SERPL-SCNC: 2.4 MMOL/L (ref 0.5–2.2)
LACTATE SERPL-SCNC: 2.6 MMOL/L (ref 0.5–2.2)
LDH SERPL L TO P-CCNC: 226 U/L (ref 110–260)
LDH SERPL L TO P-CCNC: 325 U/L (ref 110–260)
LDH SERPL L TO P-CCNC: 352 U/L (ref 110–260)
LEFT ATRIUM SIZE: 3.44 CM
LEFT ATRIUM SIZE: 4.12 CM
LEFT ATRIUM VOLUME INDEX MOD: 54.2 ML/M2
LEFT ATRIUM VOLUME INDEX: 49.2 ML/M2
LEFT ATRIUM VOLUME INDEX: 55.4 ML/M2
LEFT ATRIUM VOLUME MOD: 89 CM3
LEFT ATRIUM VOLUME: 72.64 CM3
LEFT ATRIUM VOLUME: 91.11 CM3
LEFT INTERNAL DIMENSION IN SYSTOLE: 2.98 CM (ref 2.1–4)
LEFT INTERNAL DIMENSION IN SYSTOLE: 3.79 CM (ref 2.1–4)
LEFT VENTRICLE DIASTOLIC VOLUME INDEX: 57.22 ML/M2
LEFT VENTRICLE DIASTOLIC VOLUME INDEX: 77.03 ML/M2
LEFT VENTRICLE DIASTOLIC VOLUME: 126.6 ML
LEFT VENTRICLE DIASTOLIC VOLUME: 84.52 ML
LEFT VENTRICLE MASS INDEX: 101 G/M2
LEFT VENTRICLE MASS INDEX: 90 G/M2
LEFT VENTRICLE SYSTOLIC VOLUME INDEX: 23.3 ML/M2
LEFT VENTRICLE SYSTOLIC VOLUME INDEX: 37.4 ML/M2
LEFT VENTRICLE SYSTOLIC VOLUME: 34.41 ML
LEFT VENTRICLE SYSTOLIC VOLUME: 61.55 ML
LEFT VENTRICULAR INTERNAL DIMENSION IN DIASTOLE: 4.33 CM (ref 3.5–6)
LEFT VENTRICULAR INTERNAL DIMENSION IN DIASTOLE: 5.15 CM (ref 3.5–6)
LEFT VENTRICULAR MASS: 133.27 G
LEFT VENTRICULAR MASS: 166.26 G
LEUKOCYTE ESTERASE UR QL STRIP: ABNORMAL
LEVETIRACETAM SERPL-MCNC: 12.1 UG/ML (ref 3–60)
LEVETIRACETAM SERPL-MCNC: 2.7 UG/ML (ref 3–60)
LEVETIRACETAM SERPL-MCNC: 25.6 UG/ML (ref 3–60)
LIPASE SERPL-CCNC: 25 U/L (ref 4–60)
LV LATERAL E/E' RATIO: 5.42 M/S
LV SEPTAL E/E' RATIO: 9.29 M/S
LYMPHOCYTES # BLD AUTO: 0.6 K/UL (ref 1–4.8)
LYMPHOCYTES # BLD AUTO: 0.7 K/UL (ref 1–4.8)
LYMPHOCYTES # BLD AUTO: 0.8 K/UL (ref 1–4.8)
LYMPHOCYTES # BLD AUTO: 0.9 K/UL (ref 1–4.8)
LYMPHOCYTES # BLD AUTO: 1 K/UL (ref 1–4.8)
LYMPHOCYTES # BLD AUTO: 1.1 K/UL (ref 1–4.8)
LYMPHOCYTES # BLD AUTO: 1.2 K/UL (ref 1–4.8)
LYMPHOCYTES # BLD AUTO: 1.3 K/UL (ref 1–4.8)
LYMPHOCYTES # BLD AUTO: 1.3 K/UL (ref 1–4.8)
LYMPHOCYTES # BLD AUTO: 1.4 K/UL (ref 1–4.8)
LYMPHOCYTES # BLD AUTO: 1.5 K/UL (ref 1–4.8)
LYMPHOCYTES # BLD AUTO: 1.6 K/UL (ref 1–4.8)
LYMPHOCYTES # BLD AUTO: 1.7 K/UL (ref 1–4.8)
LYMPHOCYTES # BLD AUTO: 1.7 K/UL (ref 1–4.8)
LYMPHOCYTES NFR BLD: 10 % (ref 18–48)
LYMPHOCYTES NFR BLD: 10.1 % (ref 18–48)
LYMPHOCYTES NFR BLD: 10.1 % (ref 18–48)
LYMPHOCYTES NFR BLD: 10.6 % (ref 18–48)
LYMPHOCYTES NFR BLD: 10.7 % (ref 18–48)
LYMPHOCYTES NFR BLD: 10.8 % (ref 18–48)
LYMPHOCYTES NFR BLD: 11 % (ref 18–48)
LYMPHOCYTES NFR BLD: 11.3 % (ref 18–48)
LYMPHOCYTES NFR BLD: 11.4 % (ref 18–48)
LYMPHOCYTES NFR BLD: 11.7 % (ref 18–48)
LYMPHOCYTES NFR BLD: 11.7 % (ref 18–48)
LYMPHOCYTES NFR BLD: 11.8 % (ref 18–48)
LYMPHOCYTES NFR BLD: 12.3 % (ref 18–48)
LYMPHOCYTES NFR BLD: 12.6 % (ref 18–48)
LYMPHOCYTES NFR BLD: 13 % (ref 18–48)
LYMPHOCYTES NFR BLD: 13.2 % (ref 18–48)
LYMPHOCYTES NFR BLD: 14.1 % (ref 18–48)
LYMPHOCYTES NFR BLD: 14.8 % (ref 18–48)
LYMPHOCYTES NFR BLD: 15.1 % (ref 18–48)
LYMPHOCYTES NFR BLD: 15.4 % (ref 18–48)
LYMPHOCYTES NFR BLD: 15.4 % (ref 18–48)
LYMPHOCYTES NFR BLD: 15.5 % (ref 18–48)
LYMPHOCYTES NFR BLD: 15.6 % (ref 18–48)
LYMPHOCYTES NFR BLD: 15.6 % (ref 18–48)
LYMPHOCYTES NFR BLD: 15.7 % (ref 18–48)
LYMPHOCYTES NFR BLD: 15.8 % (ref 18–48)
LYMPHOCYTES NFR BLD: 16.2 % (ref 18–48)
LYMPHOCYTES NFR BLD: 16.2 % (ref 18–48)
LYMPHOCYTES NFR BLD: 16.3 % (ref 18–48)
LYMPHOCYTES NFR BLD: 16.3 % (ref 18–48)
LYMPHOCYTES NFR BLD: 16.4 % (ref 18–48)
LYMPHOCYTES NFR BLD: 16.9 % (ref 18–48)
LYMPHOCYTES NFR BLD: 17 % (ref 18–48)
LYMPHOCYTES NFR BLD: 17 % (ref 18–48)
LYMPHOCYTES NFR BLD: 17.1 % (ref 18–48)
LYMPHOCYTES NFR BLD: 17.3 % (ref 18–48)
LYMPHOCYTES NFR BLD: 17.4 % (ref 18–48)
LYMPHOCYTES NFR BLD: 18.6 % (ref 18–48)
LYMPHOCYTES NFR BLD: 19.2 % (ref 18–48)
LYMPHOCYTES NFR BLD: 19.2 % (ref 18–48)
LYMPHOCYTES NFR BLD: 19.7 % (ref 18–48)
LYMPHOCYTES NFR BLD: 20.8 % (ref 18–48)
LYMPHOCYTES NFR BLD: 21.1 % (ref 18–48)
LYMPHOCYTES NFR BLD: 21.2 % (ref 18–48)
LYMPHOCYTES NFR BLD: 21.3 % (ref 18–48)
LYMPHOCYTES NFR BLD: 21.8 % (ref 18–48)
LYMPHOCYTES NFR BLD: 22.6 % (ref 18–48)
LYMPHOCYTES NFR BLD: 25.2 % (ref 18–48)
LYMPHOCYTES NFR BLD: 26.4 % (ref 18–48)
LYMPHOCYTES NFR BLD: 28 % (ref 18–48)
LYMPHOCYTES NFR BLD: 39 % (ref 18–48)
LYMPHOCYTES NFR BLD: 5.8 % (ref 18–48)
LYMPHOCYTES NFR BLD: 6 % (ref 18–48)
LYMPHOCYTES NFR BLD: 6.2 % (ref 18–48)
LYMPHOCYTES NFR BLD: 6.7 % (ref 18–48)
LYMPHOCYTES NFR BLD: 6.9 % (ref 18–48)
LYMPHOCYTES NFR BLD: 7 % (ref 18–48)
LYMPHOCYTES NFR BLD: 7.2 % (ref 18–48)
LYMPHOCYTES NFR BLD: 7.4 % (ref 18–48)
LYMPHOCYTES NFR BLD: 7.6 % (ref 18–48)
LYMPHOCYTES NFR BLD: 7.9 % (ref 18–48)
LYMPHOCYTES NFR BLD: 8 % (ref 18–48)
LYMPHOCYTES NFR BLD: 8.1 % (ref 18–48)
LYMPHOCYTES NFR BLD: 8.2 % (ref 18–48)
LYMPHOCYTES NFR BLD: 8.2 % (ref 18–48)
LYMPHOCYTES NFR BLD: 8.8 % (ref 18–48)
LYMPHOCYTES NFR BLD: 8.9 % (ref 18–48)
LYMPHOCYTES NFR BLD: 8.9 % (ref 18–48)
LYMPHOCYTES NFR BLD: 9.1 % (ref 18–48)
LYMPHOCYTES NFR BLD: 9.1 % (ref 18–48)
LYMPHOCYTES NFR BLD: 9.3 % (ref 18–48)
LYMPHOCYTES NFR BLD: 9.5 % (ref 18–48)
LYMPHOCYTES NFR BLD: 9.6 % (ref 18–48)
LYMPHOCYTES NFR BLD: 9.6 % (ref 18–48)
LYMPHOCYTES NFR CSF MANUAL: 30 % (ref 40–80)
LYMPHOCYTES NFR CSF MANUAL: 37 % (ref 40–80)
MAGNESIUM SERPL-MCNC: 1.7 MG/DL (ref 1.6–2.6)
MAGNESIUM SERPL-MCNC: 1.8 MG/DL (ref 1.6–2.6)
MAGNESIUM SERPL-MCNC: 1.9 MG/DL (ref 1.6–2.6)
MAGNESIUM SERPL-MCNC: 2 MG/DL (ref 1.6–2.6)
MAGNESIUM SERPL-MCNC: 2.1 MG/DL (ref 1.6–2.6)
MAGNESIUM SERPL-MCNC: 2.2 MG/DL (ref 1.6–2.6)
MAGNESIUM SERPL-MCNC: 2.3 MG/DL (ref 1.6–2.6)
MAGNESIUM SERPL-MCNC: 2.4 MG/DL (ref 1.6–2.6)
MAGNESIUM SERPL-MCNC: 2.5 MG/DL (ref 1.6–2.6)
MAGNESIUM SERPL-MCNC: 2.6 MG/DL (ref 1.6–2.6)
MAGNESIUM SERPL-MCNC: 2.6 MG/DL (ref 1.6–2.6)
MAGNESIUM SERPL-MCNC: 2.7 MG/DL (ref 1.6–2.6)
MAGNESIUM SERPL-MCNC: 2.8 MG/DL (ref 1.6–2.6)
MAGNESIUM SERPL-MCNC: 2.8 MG/DL (ref 1.6–2.6)
MCH RBC QN AUTO: 27.2 PG (ref 27–31)
MCH RBC QN AUTO: 27.4 PG (ref 27–31)
MCH RBC QN AUTO: 27.5 PG (ref 27–31)
MCH RBC QN AUTO: 27.6 PG (ref 27–31)
MCH RBC QN AUTO: 27.7 PG (ref 27–31)
MCH RBC QN AUTO: 27.8 PG (ref 27–31)
MCH RBC QN AUTO: 27.9 PG (ref 27–31)
MCH RBC QN AUTO: 28 PG (ref 27–31)
MCH RBC QN AUTO: 28.1 PG (ref 27–31)
MCH RBC QN AUTO: 28.2 PG (ref 27–31)
MCH RBC QN AUTO: 28.3 PG (ref 27–31)
MCH RBC QN AUTO: 28.4 PG (ref 27–31)
MCH RBC QN AUTO: 28.5 PG (ref 27–31)
MCH RBC QN AUTO: 28.5 PG (ref 27–31)
MCH RBC QN AUTO: 28.6 PG (ref 27–31)
MCH RBC QN AUTO: 28.7 PG (ref 27–31)
MCH RBC QN AUTO: 28.8 PG (ref 27–31)
MCH RBC QN AUTO: 28.9 PG (ref 27–31)
MCH RBC QN AUTO: 29.6 PG (ref 27–31)
MCHC RBC AUTO-ENTMCNC: 28.6 G/DL (ref 32–36)
MCHC RBC AUTO-ENTMCNC: 29.3 G/DL (ref 32–36)
MCHC RBC AUTO-ENTMCNC: 29.7 G/DL (ref 32–36)
MCHC RBC AUTO-ENTMCNC: 29.8 G/DL (ref 32–36)
MCHC RBC AUTO-ENTMCNC: 30 G/DL (ref 32–36)
MCHC RBC AUTO-ENTMCNC: 30.1 G/DL (ref 32–36)
MCHC RBC AUTO-ENTMCNC: 30.2 G/DL (ref 32–36)
MCHC RBC AUTO-ENTMCNC: 30.3 G/DL (ref 32–36)
MCHC RBC AUTO-ENTMCNC: 30.4 G/DL (ref 32–36)
MCHC RBC AUTO-ENTMCNC: 30.5 G/DL (ref 32–36)
MCHC RBC AUTO-ENTMCNC: 30.6 G/DL (ref 32–36)
MCHC RBC AUTO-ENTMCNC: 30.6 G/DL (ref 32–36)
MCHC RBC AUTO-ENTMCNC: 30.7 G/DL (ref 32–36)
MCHC RBC AUTO-ENTMCNC: 30.8 G/DL (ref 32–36)
MCHC RBC AUTO-ENTMCNC: 30.9 G/DL (ref 32–36)
MCHC RBC AUTO-ENTMCNC: 31 G/DL (ref 32–36)
MCHC RBC AUTO-ENTMCNC: 31.1 G/DL (ref 32–36)
MCHC RBC AUTO-ENTMCNC: 31.2 G/DL (ref 32–36)
MCHC RBC AUTO-ENTMCNC: 31.3 G/DL (ref 32–36)
MCHC RBC AUTO-ENTMCNC: 31.4 G/DL (ref 32–36)
MCHC RBC AUTO-ENTMCNC: 31.5 G/DL (ref 32–36)
MCHC RBC AUTO-ENTMCNC: 31.6 G/DL (ref 32–36)
MCHC RBC AUTO-ENTMCNC: 31.7 G/DL (ref 32–36)
MCHC RBC AUTO-ENTMCNC: 31.8 G/DL (ref 32–36)
MCHC RBC AUTO-ENTMCNC: 31.8 G/DL (ref 32–36)
MCHC RBC AUTO-ENTMCNC: 31.9 G/DL (ref 32–36)
MCHC RBC AUTO-ENTMCNC: 32 G/DL (ref 32–36)
MCHC RBC AUTO-ENTMCNC: 32 G/DL (ref 32–36)
MCHC RBC AUTO-ENTMCNC: 32.2 G/DL (ref 32–36)
MCHC RBC AUTO-ENTMCNC: 32.3 G/DL (ref 32–36)
MCHC RBC AUTO-ENTMCNC: 32.5 G/DL (ref 32–36)
MCHC RBC AUTO-ENTMCNC: 32.6 G/DL (ref 32–36)
MCHC RBC AUTO-ENTMCNC: 32.6 G/DL (ref 32–36)
MCHC RBC AUTO-ENTMCNC: 32.7 G/DL (ref 32–36)
MCHC RBC AUTO-ENTMCNC: 32.8 G/DL (ref 32–36)
MCHC RBC AUTO-ENTMCNC: 33 G/DL (ref 32–36)
MCHC RBC AUTO-ENTMCNC: 33.2 G/DL (ref 32–36)
MCHC RBC AUTO-ENTMCNC: 33.8 G/DL (ref 32–36)
MCV RBC AUTO: 86 FL (ref 82–98)
MCV RBC AUTO: 87 FL (ref 82–98)
MCV RBC AUTO: 88 FL (ref 82–98)
MCV RBC AUTO: 89 FL (ref 82–98)
MCV RBC AUTO: 90 FL (ref 82–98)
MCV RBC AUTO: 91 FL (ref 82–98)
MCV RBC AUTO: 92 FL (ref 82–98)
MCV RBC AUTO: 93 FL (ref 82–98)
MCV RBC AUTO: 94 FL (ref 82–98)
MCV RBC AUTO: 95 FL (ref 82–98)
MCV RBC AUTO: 96 FL (ref 82–98)
MCV RBC AUTO: 96 FL (ref 82–98)
METHADONE UR QL SCN>300 NG/ML: NEGATIVE
MICROSCOPIC COMMENT: ABNORMAL
MIN VOL: 4.8
MIN VOL: 9.18
MODE: ABNORMAL
MONOCYTES # BLD AUTO: 0.2 K/UL (ref 0.3–1)
MONOCYTES # BLD AUTO: 0.3 K/UL (ref 0.3–1)
MONOCYTES # BLD AUTO: 0.4 K/UL (ref 0.3–1)
MONOCYTES # BLD AUTO: 0.4 K/UL (ref 0.3–1)
MONOCYTES # BLD AUTO: 0.5 K/UL (ref 0.3–1)
MONOCYTES # BLD AUTO: 0.6 K/UL (ref 0.3–1)
MONOCYTES # BLD AUTO: 0.7 K/UL (ref 0.3–1)
MONOCYTES # BLD AUTO: 0.8 K/UL (ref 0.3–1)
MONOCYTES # BLD AUTO: 0.9 K/UL (ref 0.3–1)
MONOCYTES # BLD AUTO: 1 K/UL (ref 0.3–1)
MONOCYTES # BLD AUTO: 1.1 K/UL (ref 0.3–1)
MONOCYTES # BLD AUTO: 1.2 K/UL (ref 0.3–1)
MONOCYTES # BLD AUTO: 1.3 K/UL (ref 0.3–1)
MONOCYTES # BLD AUTO: 1.4 K/UL (ref 0.3–1)
MONOCYTES # BLD AUTO: 1.4 K/UL (ref 0.3–1)
MONOCYTES # BLD AUTO: 2.4 K/UL (ref 0.3–1)
MONOCYTES NFR BLD: 10 % (ref 4–15)
MONOCYTES NFR BLD: 10.1 % (ref 4–15)
MONOCYTES NFR BLD: 10.2 % (ref 4–15)
MONOCYTES NFR BLD: 10.4 % (ref 4–15)
MONOCYTES NFR BLD: 10.5 % (ref 4–15)
MONOCYTES NFR BLD: 10.6 % (ref 4–15)
MONOCYTES NFR BLD: 10.8 % (ref 4–15)
MONOCYTES NFR BLD: 10.9 % (ref 4–15)
MONOCYTES NFR BLD: 10.9 % (ref 4–15)
MONOCYTES NFR BLD: 11.1 % (ref 4–15)
MONOCYTES NFR BLD: 11.3 % (ref 4–15)
MONOCYTES NFR BLD: 11.5 % (ref 4–15)
MONOCYTES NFR BLD: 11.5 % (ref 4–15)
MONOCYTES NFR BLD: 11.6 % (ref 4–15)
MONOCYTES NFR BLD: 11.6 % (ref 4–15)
MONOCYTES NFR BLD: 11.7 % (ref 4–15)
MONOCYTES NFR BLD: 11.8 % (ref 4–15)
MONOCYTES NFR BLD: 12.1 % (ref 4–15)
MONOCYTES NFR BLD: 12.3 % (ref 4–15)
MONOCYTES NFR BLD: 12.5 % (ref 4–15)
MONOCYTES NFR BLD: 12.6 % (ref 4–15)
MONOCYTES NFR BLD: 12.6 % (ref 4–15)
MONOCYTES NFR BLD: 12.8 % (ref 4–15)
MONOCYTES NFR BLD: 12.9 % (ref 4–15)
MONOCYTES NFR BLD: 13 % (ref 4–15)
MONOCYTES NFR BLD: 13.1 % (ref 4–15)
MONOCYTES NFR BLD: 13.1 % (ref 4–15)
MONOCYTES NFR BLD: 13.6 % (ref 4–15)
MONOCYTES NFR BLD: 13.6 % (ref 4–15)
MONOCYTES NFR BLD: 14 % (ref 4–15)
MONOCYTES NFR BLD: 14.1 % (ref 4–15)
MONOCYTES NFR BLD: 14.4 % (ref 4–15)
MONOCYTES NFR BLD: 14.6 % (ref 4–15)
MONOCYTES NFR BLD: 14.8 % (ref 4–15)
MONOCYTES NFR BLD: 15 % (ref 4–15)
MONOCYTES NFR BLD: 15.6 % (ref 4–15)
MONOCYTES NFR BLD: 17 % (ref 4–15)
MONOCYTES NFR BLD: 3.3 % (ref 4–15)
MONOCYTES NFR BLD: 4.3 % (ref 4–15)
MONOCYTES NFR BLD: 5 % (ref 4–15)
MONOCYTES NFR BLD: 5.2 % (ref 4–15)
MONOCYTES NFR BLD: 5.4 % (ref 4–15)
MONOCYTES NFR BLD: 5.4 % (ref 4–15)
MONOCYTES NFR BLD: 5.5 % (ref 4–15)
MONOCYTES NFR BLD: 5.7 % (ref 4–15)
MONOCYTES NFR BLD: 6.2 % (ref 4–15)
MONOCYTES NFR BLD: 6.4 % (ref 4–15)
MONOCYTES NFR BLD: 6.8 % (ref 4–15)
MONOCYTES NFR BLD: 6.9 % (ref 4–15)
MONOCYTES NFR BLD: 7.4 % (ref 4–15)
MONOCYTES NFR BLD: 7.5 % (ref 4–15)
MONOCYTES NFR BLD: 7.6 % (ref 4–15)
MONOCYTES NFR BLD: 8 % (ref 4–15)
MONOCYTES NFR BLD: 8.1 % (ref 4–15)
MONOCYTES NFR BLD: 8.2 % (ref 4–15)
MONOCYTES NFR BLD: 8.3 % (ref 4–15)
MONOCYTES NFR BLD: 8.3 % (ref 4–15)
MONOCYTES NFR BLD: 8.4 % (ref 4–15)
MONOCYTES NFR BLD: 8.4 % (ref 4–15)
MONOCYTES NFR BLD: 8.5 % (ref 4–15)
MONOCYTES NFR BLD: 8.6 % (ref 4–15)
MONOCYTES NFR BLD: 8.8 % (ref 4–15)
MONOCYTES NFR BLD: 9 % (ref 4–15)
MONOCYTES NFR BLD: 9 % (ref 4–15)
MONOCYTES NFR BLD: 9.1 % (ref 4–15)
MONOCYTES NFR BLD: 9.1 % (ref 4–15)
MONOCYTES NFR BLD: 9.3 % (ref 4–15)
MONOCYTES NFR BLD: 9.8 % (ref 4–15)
MONOCYTES NFR BLD: 9.9 % (ref 4–15)
MONOS+MACROS NFR CSF MANUAL: 22 % (ref 15–45)
MONOS+MACROS NFR CSF MANUAL: 23 % (ref 15–45)
MV PEAK A VEL: 0.84 M/S
MV PEAK E VEL: 0.65 M/S
MYCOBACTERIUM SPEC QL CULT: NORMAL
MYELOCYTES NFR BLD MANUAL: 2 %
NEUTROPHILS # BLD AUTO: 10.1 K/UL (ref 1.8–7.7)
NEUTROPHILS # BLD AUTO: 10.7 K/UL (ref 1.8–7.7)
NEUTROPHILS # BLD AUTO: 11.8 K/UL (ref 1.8–7.7)
NEUTROPHILS # BLD AUTO: 12 K/UL (ref 1.8–7.7)
NEUTROPHILS # BLD AUTO: 13 K/UL (ref 1.8–7.7)
NEUTROPHILS # BLD AUTO: 13.4 K/UL (ref 1.8–7.7)
NEUTROPHILS # BLD AUTO: 19.6 K/UL (ref 1.8–7.7)
NEUTROPHILS # BLD AUTO: 2.6 K/UL (ref 1.8–7.7)
NEUTROPHILS # BLD AUTO: 2.7 K/UL (ref 1.8–7.7)
NEUTROPHILS # BLD AUTO: 2.9 K/UL (ref 1.8–7.7)
NEUTROPHILS # BLD AUTO: 3 K/UL (ref 1.8–7.7)
NEUTROPHILS # BLD AUTO: 3.1 K/UL (ref 1.8–7.7)
NEUTROPHILS # BLD AUTO: 3.2 K/UL (ref 1.8–7.7)
NEUTROPHILS # BLD AUTO: 3.5 K/UL (ref 1.8–7.7)
NEUTROPHILS # BLD AUTO: 3.7 K/UL (ref 1.8–7.7)
NEUTROPHILS # BLD AUTO: 3.8 K/UL (ref 1.8–7.7)
NEUTROPHILS # BLD AUTO: 3.9 K/UL (ref 1.8–7.7)
NEUTROPHILS # BLD AUTO: 4 K/UL (ref 1.8–7.7)
NEUTROPHILS # BLD AUTO: 4.1 K/UL (ref 1.8–7.7)
NEUTROPHILS # BLD AUTO: 4.1 K/UL (ref 1.8–7.7)
NEUTROPHILS # BLD AUTO: 4.3 K/UL (ref 1.8–7.7)
NEUTROPHILS # BLD AUTO: 4.4 K/UL (ref 1.8–7.7)
NEUTROPHILS # BLD AUTO: 4.5 K/UL (ref 1.8–7.7)
NEUTROPHILS # BLD AUTO: 4.5 K/UL (ref 1.8–7.7)
NEUTROPHILS # BLD AUTO: 4.6 K/UL (ref 1.8–7.7)
NEUTROPHILS # BLD AUTO: 4.6 K/UL (ref 1.8–7.7)
NEUTROPHILS # BLD AUTO: 4.7 K/UL (ref 1.8–7.7)
NEUTROPHILS # BLD AUTO: 4.8 K/UL (ref 1.8–7.7)
NEUTROPHILS # BLD AUTO: 4.9 K/UL (ref 1.8–7.7)
NEUTROPHILS # BLD AUTO: 5 K/UL (ref 1.8–7.7)
NEUTROPHILS # BLD AUTO: 5.1 K/UL (ref 1.8–7.7)
NEUTROPHILS # BLD AUTO: 5.2 K/UL (ref 1.8–7.7)
NEUTROPHILS # BLD AUTO: 5.2 K/UL (ref 1.8–7.7)
NEUTROPHILS # BLD AUTO: 5.3 K/UL (ref 1.8–7.7)
NEUTROPHILS # BLD AUTO: 5.5 K/UL (ref 1.8–7.7)
NEUTROPHILS # BLD AUTO: 5.5 K/UL (ref 1.8–7.7)
NEUTROPHILS # BLD AUTO: 5.6 K/UL (ref 1.8–7.7)
NEUTROPHILS # BLD AUTO: 5.8 K/UL (ref 1.8–7.7)
NEUTROPHILS # BLD AUTO: 5.8 K/UL (ref 1.8–7.7)
NEUTROPHILS # BLD AUTO: 6 K/UL (ref 1.8–7.7)
NEUTROPHILS # BLD AUTO: 6.1 K/UL (ref 1.8–7.7)
NEUTROPHILS # BLD AUTO: 6.1 K/UL (ref 1.8–7.7)
NEUTROPHILS # BLD AUTO: 6.3 K/UL (ref 1.8–7.7)
NEUTROPHILS # BLD AUTO: 6.4 K/UL (ref 1.8–7.7)
NEUTROPHILS # BLD AUTO: 6.5 K/UL (ref 1.8–7.7)
NEUTROPHILS # BLD AUTO: 6.7 K/UL (ref 1.8–7.7)
NEUTROPHILS # BLD AUTO: 6.9 K/UL (ref 1.8–7.7)
NEUTROPHILS # BLD AUTO: 6.9 K/UL (ref 1.8–7.7)
NEUTROPHILS # BLD AUTO: 7 K/UL (ref 1.8–7.7)
NEUTROPHILS # BLD AUTO: 7.1 K/UL (ref 1.8–7.7)
NEUTROPHILS # BLD AUTO: 7.1 K/UL (ref 1.8–7.7)
NEUTROPHILS # BLD AUTO: 7.3 K/UL (ref 1.8–7.7)
NEUTROPHILS # BLD AUTO: 7.6 K/UL (ref 1.8–7.7)
NEUTROPHILS # BLD AUTO: 7.6 K/UL (ref 1.8–7.7)
NEUTROPHILS # BLD AUTO: 8 K/UL (ref 1.8–7.7)
NEUTROPHILS # BLD AUTO: 8 K/UL (ref 1.8–7.7)
NEUTROPHILS # BLD AUTO: 8.1 K/UL (ref 1.8–7.7)
NEUTROPHILS # BLD AUTO: 8.2 K/UL (ref 1.8–7.7)
NEUTROPHILS # BLD AUTO: 8.6 K/UL (ref 1.8–7.7)
NEUTROPHILS # BLD AUTO: 8.7 K/UL (ref 1.8–7.7)
NEUTROPHILS # BLD AUTO: 8.8 K/UL (ref 1.8–7.7)
NEUTROPHILS # BLD AUTO: 8.9 K/UL (ref 1.8–7.7)
NEUTROPHILS # BLD AUTO: 8.9 K/UL (ref 1.8–7.7)
NEUTROPHILS # BLD AUTO: 9.1 K/UL (ref 1.8–7.7)
NEUTROPHILS # BLD AUTO: 9.2 K/UL (ref 1.8–7.7)
NEUTROPHILS # BLD AUTO: 9.6 K/UL (ref 1.8–7.7)
NEUTROPHILS NFR BLD: 40 % (ref 38–73)
NEUTROPHILS NFR BLD: 54 % (ref 38–73)
NEUTROPHILS NFR BLD: 56.3 % (ref 38–73)
NEUTROPHILS NFR BLD: 58.6 % (ref 38–73)
NEUTROPHILS NFR BLD: 58.7 % (ref 38–73)
NEUTROPHILS NFR BLD: 60.3 % (ref 38–73)
NEUTROPHILS NFR BLD: 60.9 % (ref 38–73)
NEUTROPHILS NFR BLD: 62.3 % (ref 38–73)
NEUTROPHILS NFR BLD: 62.7 % (ref 38–73)
NEUTROPHILS NFR BLD: 63.7 % (ref 38–73)
NEUTROPHILS NFR BLD: 63.9 % (ref 38–73)
NEUTROPHILS NFR BLD: 64.2 % (ref 38–73)
NEUTROPHILS NFR BLD: 64.3 % (ref 38–73)
NEUTROPHILS NFR BLD: 65 % (ref 38–73)
NEUTROPHILS NFR BLD: 65 % (ref 38–73)
NEUTROPHILS NFR BLD: 65.5 % (ref 38–73)
NEUTROPHILS NFR BLD: 66.6 % (ref 38–73)
NEUTROPHILS NFR BLD: 66.7 % (ref 38–73)
NEUTROPHILS NFR BLD: 67.1 % (ref 38–73)
NEUTROPHILS NFR BLD: 67.1 % (ref 38–73)
NEUTROPHILS NFR BLD: 67.5 % (ref 38–73)
NEUTROPHILS NFR BLD: 67.8 % (ref 38–73)
NEUTROPHILS NFR BLD: 68 % (ref 38–73)
NEUTROPHILS NFR BLD: 68.1 % (ref 38–73)
NEUTROPHILS NFR BLD: 68.1 % (ref 38–73)
NEUTROPHILS NFR BLD: 68.8 % (ref 38–73)
NEUTROPHILS NFR BLD: 69 % (ref 38–73)
NEUTROPHILS NFR BLD: 69.1 % (ref 38–73)
NEUTROPHILS NFR BLD: 69.8 % (ref 38–73)
NEUTROPHILS NFR BLD: 70 % (ref 38–73)
NEUTROPHILS NFR BLD: 70.1 % (ref 38–73)
NEUTROPHILS NFR BLD: 70.2 % (ref 38–73)
NEUTROPHILS NFR BLD: 70.2 % (ref 38–73)
NEUTROPHILS NFR BLD: 70.3 % (ref 38–73)
NEUTROPHILS NFR BLD: 70.4 % (ref 38–73)
NEUTROPHILS NFR BLD: 70.6 % (ref 38–73)
NEUTROPHILS NFR BLD: 70.7 % (ref 38–73)
NEUTROPHILS NFR BLD: 71 % (ref 38–73)
NEUTROPHILS NFR BLD: 71.1 % (ref 38–73)
NEUTROPHILS NFR BLD: 71.3 % (ref 38–73)
NEUTROPHILS NFR BLD: 72.1 % (ref 38–73)
NEUTROPHILS NFR BLD: 73.5 % (ref 38–73)
NEUTROPHILS NFR BLD: 73.7 % (ref 38–73)
NEUTROPHILS NFR BLD: 74.3 % (ref 38–73)
NEUTROPHILS NFR BLD: 74.6 % (ref 38–73)
NEUTROPHILS NFR BLD: 74.6 % (ref 38–73)
NEUTROPHILS NFR BLD: 74.8 % (ref 38–73)
NEUTROPHILS NFR BLD: 75.3 % (ref 38–73)
NEUTROPHILS NFR BLD: 75.5 % (ref 38–73)
NEUTROPHILS NFR BLD: 75.9 % (ref 38–73)
NEUTROPHILS NFR BLD: 76 % (ref 38–73)
NEUTROPHILS NFR BLD: 76.2 % (ref 38–73)
NEUTROPHILS NFR BLD: 76.4 % (ref 38–73)
NEUTROPHILS NFR BLD: 76.7 % (ref 38–73)
NEUTROPHILS NFR BLD: 77.1 % (ref 38–73)
NEUTROPHILS NFR BLD: 77.4 % (ref 38–73)
NEUTROPHILS NFR BLD: 77.6 % (ref 38–73)
NEUTROPHILS NFR BLD: 77.8 % (ref 38–73)
NEUTROPHILS NFR BLD: 77.8 % (ref 38–73)
NEUTROPHILS NFR BLD: 78.1 % (ref 38–73)
NEUTROPHILS NFR BLD: 78.2 % (ref 38–73)
NEUTROPHILS NFR BLD: 78.8 % (ref 38–73)
NEUTROPHILS NFR BLD: 78.9 % (ref 38–73)
NEUTROPHILS NFR BLD: 79.1 % (ref 38–73)
NEUTROPHILS NFR BLD: 80.5 % (ref 38–73)
NEUTROPHILS NFR BLD: 80.6 % (ref 38–73)
NEUTROPHILS NFR BLD: 81.6 % (ref 38–73)
NEUTROPHILS NFR BLD: 82.5 % (ref 38–73)
NEUTROPHILS NFR BLD: 82.7 % (ref 38–73)
NEUTROPHILS NFR BLD: 82.9 % (ref 38–73)
NEUTROPHILS NFR BLD: 83.4 % (ref 38–73)
NEUTROPHILS NFR BLD: 83.5 % (ref 38–73)
NEUTROPHILS NFR BLD: 83.7 % (ref 38–73)
NEUTROPHILS NFR BLD: 83.9 % (ref 38–73)
NEUTROPHILS NFR BLD: 85 % (ref 38–73)
NEUTROPHILS NFR BLD: 85.3 % (ref 38–73)
NEUTROPHILS NFR CSF MANUAL: 41 % (ref 0–6)
NEUTROPHILS NFR CSF MANUAL: 46 % (ref 0–6)
NITRITE UR QL STRIP: NEGATIVE
NRBC BLD-RTO: 0 /100 WBC
NRBC BLD-RTO: 1 /100 WBC
NRBC BLD-RTO: 1 /100 WBC
NUM UNITS TRANS PACKED RBC: NORMAL
OPIATES UR QL SCN: NEGATIVE
OVALOCYTES BLD QL SMEAR: ABNORMAL
PARAINFLUENZA: NOT DETECTED
PCO2 BLDA: 32.6 MMHG (ref 35–45)
PCO2 BLDA: 34 MMHG (ref 35–45)
PCO2 BLDA: 34.4 MMHG (ref 35–45)
PCO2 BLDA: 37.1 MMHG (ref 35–45)
PCO2 BLDA: 39.7 MMHG (ref 35–45)
PCO2 BLDA: 43.4 MMHG (ref 35–45)
PCO2 BLDA: 52.4 MMHG (ref 35–45)
PCO2 BLDA: 53.2 MMHG (ref 35–45)
PCO2 BLDA: 54.3 MMHG (ref 35–45)
PCP UR QL SCN>25 NG/ML: NEGATIVE
PEEP: 5
PH SMN: 7.36 [PH] (ref 7.35–7.45)
PH SMN: 7.36 [PH] (ref 7.35–7.45)
PH SMN: 7.38 [PH] (ref 7.35–7.45)
PH SMN: 7.4 [PH] (ref 7.35–7.45)
PH SMN: 7.44 [PH] (ref 7.35–7.45)
PH SMN: 7.45 [PH] (ref 7.35–7.45)
PH SMN: 7.48 [PH] (ref 7.35–7.45)
PH SMN: 7.65 [PH] (ref 7.35–7.45)
PH SMN: 7.66 [PH] (ref 7.35–7.45)
PH UR STRIP: 8 [PH] (ref 5–8)
PHOSPHATE SERPL-MCNC: 1.1 MG/DL (ref 2.7–4.5)
PHOSPHATE SERPL-MCNC: 1.6 MG/DL (ref 2.7–4.5)
PHOSPHATE SERPL-MCNC: 1.7 MG/DL (ref 2.7–4.5)
PHOSPHATE SERPL-MCNC: 1.7 MG/DL (ref 2.7–4.5)
PHOSPHATE SERPL-MCNC: 1.9 MG/DL (ref 2.7–4.5)
PHOSPHATE SERPL-MCNC: 10.3 MG/DL (ref 2.7–4.5)
PHOSPHATE SERPL-MCNC: 2.2 MG/DL (ref 2.7–4.5)
PHOSPHATE SERPL-MCNC: 2.5 MG/DL (ref 2.7–4.5)
PHOSPHATE SERPL-MCNC: 2.6 MG/DL (ref 2.7–4.5)
PHOSPHATE SERPL-MCNC: 2.7 MG/DL (ref 2.7–4.5)
PHOSPHATE SERPL-MCNC: 2.7 MG/DL (ref 2.7–4.5)
PHOSPHATE SERPL-MCNC: 2.8 MG/DL (ref 2.7–4.5)
PHOSPHATE SERPL-MCNC: 2.9 MG/DL (ref 2.7–4.5)
PHOSPHATE SERPL-MCNC: 3.1 MG/DL (ref 2.7–4.5)
PHOSPHATE SERPL-MCNC: 3.2 MG/DL (ref 2.7–4.5)
PHOSPHATE SERPL-MCNC: 3.4 MG/DL (ref 2.7–4.5)
PHOSPHATE SERPL-MCNC: 3.4 MG/DL (ref 2.7–4.5)
PHOSPHATE SERPL-MCNC: 3.5 MG/DL (ref 2.7–4.5)
PHOSPHATE SERPL-MCNC: 3.6 MG/DL (ref 2.7–4.5)
PHOSPHATE SERPL-MCNC: 3.7 MG/DL (ref 2.7–4.5)
PHOSPHATE SERPL-MCNC: 3.8 MG/DL (ref 2.7–4.5)
PHOSPHATE SERPL-MCNC: 4 MG/DL (ref 2.7–4.5)
PHOSPHATE SERPL-MCNC: 4.1 MG/DL (ref 2.7–4.5)
PHOSPHATE SERPL-MCNC: 4.4 MG/DL (ref 2.7–4.5)
PHOSPHATE SERPL-MCNC: 4.5 MG/DL (ref 2.7–4.5)
PHOSPHATE SERPL-MCNC: 4.5 MG/DL (ref 2.7–4.5)
PHOSPHATE SERPL-MCNC: 4.6 MG/DL (ref 2.7–4.5)
PHOSPHATE SERPL-MCNC: 4.7 MG/DL (ref 2.7–4.5)
PHOSPHATE SERPL-MCNC: 4.8 MG/DL (ref 2.7–4.5)
PHOSPHATE SERPL-MCNC: 4.9 MG/DL (ref 2.7–4.5)
PHOSPHATE SERPL-MCNC: 5 MG/DL (ref 2.7–4.5)
PHOSPHATE SERPL-MCNC: 5.1 MG/DL (ref 2.7–4.5)
PHOSPHATE SERPL-MCNC: 5.2 MG/DL (ref 2.7–4.5)
PHOSPHATE SERPL-MCNC: 5.3 MG/DL (ref 2.7–4.5)
PHOSPHATE SERPL-MCNC: 5.5 MG/DL (ref 2.7–4.5)
PHOSPHATE SERPL-MCNC: 5.7 MG/DL (ref 2.7–4.5)
PHOSPHATE SERPL-MCNC: 5.8 MG/DL (ref 2.7–4.5)
PHOSPHATE SERPL-MCNC: 5.8 MG/DL (ref 2.7–4.5)
PHOSPHATE SERPL-MCNC: 5.9 MG/DL (ref 2.7–4.5)
PHOSPHATE SERPL-MCNC: 6 MG/DL (ref 2.7–4.5)
PHOSPHATE SERPL-MCNC: 6 MG/DL (ref 2.7–4.5)
PHOSPHATE SERPL-MCNC: 7.3 MG/DL (ref 2.7–4.5)
PHOSPHATE SERPL-MCNC: 8.5 MG/DL (ref 2.7–4.5)
PIP: 22
PIP: 22
PISA TR MAX VEL: 2.77 M/S
PISA TR MAX VEL: 3.93 M/S
PLATELET # BLD AUTO: 129 K/UL (ref 150–350)
PLATELET # BLD AUTO: 155 K/UL (ref 150–350)
PLATELET # BLD AUTO: 168 K/UL (ref 150–350)
PLATELET # BLD AUTO: 169 K/UL (ref 150–350)
PLATELET # BLD AUTO: 172 K/UL (ref 150–350)
PLATELET # BLD AUTO: 174 K/UL (ref 150–350)
PLATELET # BLD AUTO: 175 K/UL (ref 150–350)
PLATELET # BLD AUTO: 177 K/UL (ref 150–350)
PLATELET # BLD AUTO: 177 K/UL (ref 150–350)
PLATELET # BLD AUTO: 178 K/UL (ref 150–350)
PLATELET # BLD AUTO: 179 K/UL (ref 150–350)
PLATELET # BLD AUTO: 181 K/UL (ref 150–350)
PLATELET # BLD AUTO: 189 K/UL (ref 150–350)
PLATELET # BLD AUTO: 189 K/UL (ref 150–350)
PLATELET # BLD AUTO: 190 K/UL (ref 150–350)
PLATELET # BLD AUTO: 191 K/UL (ref 150–350)
PLATELET # BLD AUTO: 193 K/UL (ref 150–350)
PLATELET # BLD AUTO: 197 K/UL (ref 150–350)
PLATELET # BLD AUTO: 198 K/UL (ref 150–350)
PLATELET # BLD AUTO: 200 K/UL (ref 150–350)
PLATELET # BLD AUTO: 201 K/UL (ref 150–350)
PLATELET # BLD AUTO: 206 K/UL (ref 150–350)
PLATELET # BLD AUTO: 207 K/UL (ref 150–350)
PLATELET # BLD AUTO: 209 K/UL (ref 150–350)
PLATELET # BLD AUTO: 210 K/UL (ref 150–350)
PLATELET # BLD AUTO: 210 K/UL (ref 150–350)
PLATELET # BLD AUTO: 214 K/UL (ref 150–350)
PLATELET # BLD AUTO: 217 K/UL (ref 150–350)
PLATELET # BLD AUTO: 220 K/UL (ref 150–350)
PLATELET # BLD AUTO: 223 K/UL (ref 150–350)
PLATELET # BLD AUTO: 225 K/UL (ref 150–350)
PLATELET # BLD AUTO: 228 K/UL (ref 150–350)
PLATELET # BLD AUTO: 233 K/UL (ref 150–350)
PLATELET # BLD AUTO: 233 K/UL (ref 150–350)
PLATELET # BLD AUTO: 235 K/UL (ref 150–350)
PLATELET # BLD AUTO: 239 K/UL (ref 150–350)
PLATELET # BLD AUTO: 240 K/UL (ref 150–350)
PLATELET # BLD AUTO: 246 K/UL (ref 150–350)
PLATELET # BLD AUTO: 247 K/UL (ref 150–350)
PLATELET # BLD AUTO: 249 K/UL (ref 150–350)
PLATELET # BLD AUTO: 249 K/UL (ref 150–350)
PLATELET # BLD AUTO: 250 K/UL (ref 150–350)
PLATELET # BLD AUTO: 253 K/UL (ref 150–350)
PLATELET # BLD AUTO: 262 K/UL (ref 150–350)
PLATELET # BLD AUTO: 262 K/UL (ref 150–350)
PLATELET # BLD AUTO: 268 K/UL (ref 150–350)
PLATELET # BLD AUTO: 272 K/UL (ref 150–350)
PLATELET # BLD AUTO: 272 K/UL (ref 150–350)
PLATELET # BLD AUTO: 277 K/UL (ref 150–350)
PLATELET # BLD AUTO: 277 K/UL (ref 150–350)
PLATELET # BLD AUTO: 282 K/UL (ref 150–350)
PLATELET # BLD AUTO: 282 K/UL (ref 150–350)
PLATELET # BLD AUTO: 284 K/UL (ref 150–350)
PLATELET # BLD AUTO: 286 K/UL (ref 150–350)
PLATELET # BLD AUTO: 289 K/UL (ref 150–350)
PLATELET # BLD AUTO: 298 K/UL (ref 150–350)
PLATELET # BLD AUTO: 298 K/UL (ref 150–350)
PLATELET # BLD AUTO: 299 K/UL (ref 150–350)
PLATELET # BLD AUTO: 300 K/UL (ref 150–350)
PLATELET # BLD AUTO: 301 K/UL (ref 150–350)
PLATELET # BLD AUTO: 306 K/UL (ref 150–350)
PLATELET # BLD AUTO: 306 K/UL (ref 150–350)
PLATELET # BLD AUTO: 311 K/UL (ref 150–350)
PLATELET # BLD AUTO: 320 K/UL (ref 150–350)
PLATELET # BLD AUTO: 321 K/UL (ref 150–350)
PLATELET # BLD AUTO: 322 K/UL (ref 150–350)
PLATELET # BLD AUTO: 323 K/UL (ref 150–350)
PLATELET # BLD AUTO: 323 K/UL (ref 150–350)
PLATELET # BLD AUTO: 349 K/UL (ref 150–350)
PLATELET # BLD AUTO: 358 K/UL (ref 150–350)
PLATELET # BLD AUTO: 361 K/UL (ref 150–350)
PLATELET # BLD AUTO: 374 K/UL (ref 150–350)
PLATELET # BLD AUTO: 391 K/UL (ref 150–350)
PLATELET # BLD AUTO: 414 K/UL (ref 150–350)
PLATELET # BLD AUTO: ABNORMAL K/UL (ref 150–350)
PLATELET BLD QL SMEAR: ABNORMAL
PMV BLD AUTO: 10.2 FL (ref 9.2–12.9)
PMV BLD AUTO: 10.6 FL (ref 9.2–12.9)
PMV BLD AUTO: 10.7 FL (ref 9.2–12.9)
PMV BLD AUTO: 10.8 FL (ref 9.2–12.9)
PMV BLD AUTO: 10.9 FL (ref 9.2–12.9)
PMV BLD AUTO: 11 FL (ref 9.2–12.9)
PMV BLD AUTO: 11.1 FL (ref 9.2–12.9)
PMV BLD AUTO: 11.2 FL (ref 9.2–12.9)
PMV BLD AUTO: 11.2 FL (ref 9.2–12.9)
PMV BLD AUTO: 11.3 FL (ref 9.2–12.9)
PMV BLD AUTO: 11.4 FL (ref 9.2–12.9)
PMV BLD AUTO: 11.5 FL (ref 9.2–12.9)
PMV BLD AUTO: 11.6 FL (ref 9.2–12.9)
PMV BLD AUTO: 11.7 FL (ref 9.2–12.9)
PMV BLD AUTO: 11.8 FL (ref 9.2–12.9)
PMV BLD AUTO: 11.8 FL (ref 9.2–12.9)
PMV BLD AUTO: 11.9 FL (ref 9.2–12.9)
PMV BLD AUTO: 11.9 FL (ref 9.2–12.9)
PMV BLD AUTO: 12 FL (ref 9.2–12.9)
PMV BLD AUTO: 12.1 FL (ref 9.2–12.9)
PMV BLD AUTO: 12.2 FL (ref 9.2–12.9)
PMV BLD AUTO: 12.3 FL (ref 9.2–12.9)
PMV BLD AUTO: 12.5 FL (ref 9.2–12.9)
PMV BLD AUTO: 12.5 FL (ref 9.2–12.9)
PMV BLD AUTO: 12.6 FL (ref 9.2–12.9)
PMV BLD AUTO: 12.7 FL (ref 9.2–12.9)
PMV BLD AUTO: 12.8 FL (ref 9.2–12.9)
PMV BLD AUTO: 12.9 FL (ref 9.2–12.9)
PMV BLD AUTO: 9.6 FL (ref 9.2–12.9)
PMV BLD AUTO: 9.9 FL (ref 9.2–12.9)
PMV BLD AUTO: ABNORMAL FL (ref 9.2–12.9)
PMV BLD AUTO: ABNORMAL FL (ref 9.2–12.9)
PO2 BLDA: 109 MMHG (ref 80–100)
PO2 BLDA: 138 MMHG (ref 80–100)
PO2 BLDA: 211 MMHG (ref 80–100)
PO2 BLDA: 27 MMHG (ref 40–60)
PO2 BLDA: 31 MMHG (ref 40–60)
PO2 BLDA: 40 MMHG (ref 40–60)
PO2 BLDA: 45 MMHG (ref 80–100)
PO2 BLDA: 56 MMHG (ref 40–60)
PO2 BLDA: 68 MMHG (ref 40–60)
POC BE: -3 MMOL/L
POC BE: -4 MMOL/L
POC BE: 1 MMOL/L
POC BE: 1 MMOL/L
POC BE: 12 MMOL/L
POC BE: 16 MMOL/L
POC BE: 17 MMOL/L
POC BE: 17 MMOL/L
POC BE: 5 MMOL/L
POC IONIZED CALCIUM: 0.92 MMOL/L (ref 1.06–1.42)
POC SATURATED O2: 100 % (ref 95–100)
POC SATURATED O2: 100 % (ref 95–100)
POC SATURATED O2: 50 % (ref 95–100)
POC SATURATED O2: 63 % (ref 95–100)
POC SATURATED O2: 72 % (ref 95–100)
POC SATURATED O2: 79 % (ref 95–100)
POC SATURATED O2: 89 % (ref 95–100)
POC SATURATED O2: 94 % (ref 95–100)
POC SATURATED O2: 98 % (ref 95–100)
POC TCO2 (MEASURED): 42 MMOL/L (ref 23–29)
POC TCO2: 22 MMOL/L (ref 23–27)
POC TCO2: 24 MMOL/L (ref 23–27)
POC TCO2: 26 MMOL/L (ref 24–29)
POC TCO2: 27 MMOL/L (ref 24–29)
POC TCO2: 32 MMOL/L (ref 24–29)
POC TCO2: 38 MMOL/L (ref 23–27)
POC TCO2: 38 MMOL/L (ref 24–29)
POC TCO2: 39 MMOL/L (ref 23–27)
POC TCO2: 42 MMOL/L (ref 24–29)
POCT GLUCOSE: 102 MG/DL (ref 70–110)
POCT GLUCOSE: 104 MG/DL (ref 70–110)
POCT GLUCOSE: 106 MG/DL (ref 70–110)
POCT GLUCOSE: 106 MG/DL (ref 70–110)
POCT GLUCOSE: 109 MG/DL (ref 70–110)
POCT GLUCOSE: 114 MG/DL (ref 70–110)
POCT GLUCOSE: 114 MG/DL (ref 70–110)
POCT GLUCOSE: 116 MG/DL (ref 70–110)
POCT GLUCOSE: 117 MG/DL (ref 70–110)
POCT GLUCOSE: 119 MG/DL (ref 70–110)
POCT GLUCOSE: 119 MG/DL (ref 70–110)
POCT GLUCOSE: 120 MG/DL (ref 70–110)
POCT GLUCOSE: 122 MG/DL (ref 70–110)
POCT GLUCOSE: 122 MG/DL (ref 70–110)
POCT GLUCOSE: 123 MG/DL (ref 70–110)
POCT GLUCOSE: 125 MG/DL (ref 70–110)
POCT GLUCOSE: 127 MG/DL (ref 70–110)
POCT GLUCOSE: 130 MG/DL (ref 70–110)
POCT GLUCOSE: 133 MG/DL (ref 70–110)
POCT GLUCOSE: 134 MG/DL (ref 70–110)
POCT GLUCOSE: 135 MG/DL (ref 70–110)
POCT GLUCOSE: 135 MG/DL (ref 70–110)
POCT GLUCOSE: 136 MG/DL (ref 70–110)
POCT GLUCOSE: 136 MG/DL (ref 70–110)
POCT GLUCOSE: 137 MG/DL (ref 70–110)
POCT GLUCOSE: 138 MG/DL (ref 70–110)
POCT GLUCOSE: 138 MG/DL (ref 70–110)
POCT GLUCOSE: 139 MG/DL (ref 70–110)
POCT GLUCOSE: 140 MG/DL (ref 70–110)
POCT GLUCOSE: 141 MG/DL (ref 70–110)
POCT GLUCOSE: 143 MG/DL (ref 70–110)
POCT GLUCOSE: 143 MG/DL (ref 70–110)
POCT GLUCOSE: 144 MG/DL (ref 70–110)
POCT GLUCOSE: 145 MG/DL (ref 70–110)
POCT GLUCOSE: 145 MG/DL (ref 70–110)
POCT GLUCOSE: 147 MG/DL (ref 70–110)
POCT GLUCOSE: 147 MG/DL (ref 70–110)
POCT GLUCOSE: 149 MG/DL (ref 70–110)
POCT GLUCOSE: 150 MG/DL (ref 70–110)
POCT GLUCOSE: 152 MG/DL (ref 70–110)
POCT GLUCOSE: 153 MG/DL (ref 70–110)
POCT GLUCOSE: 153 MG/DL (ref 70–110)
POCT GLUCOSE: 154 MG/DL (ref 70–110)
POCT GLUCOSE: 155 MG/DL (ref 70–110)
POCT GLUCOSE: 156 MG/DL (ref 70–110)
POCT GLUCOSE: 157 MG/DL (ref 70–110)
POCT GLUCOSE: 158 MG/DL (ref 70–110)
POCT GLUCOSE: 159 MG/DL (ref 70–110)
POCT GLUCOSE: 159 MG/DL (ref 70–110)
POCT GLUCOSE: 160 MG/DL (ref 70–110)
POCT GLUCOSE: 161 MG/DL (ref 70–110)
POCT GLUCOSE: 164 MG/DL (ref 70–110)
POCT GLUCOSE: 164 MG/DL (ref 70–110)
POCT GLUCOSE: 165 MG/DL (ref 70–110)
POCT GLUCOSE: 167 MG/DL (ref 70–110)
POCT GLUCOSE: 168 MG/DL (ref 70–110)
POCT GLUCOSE: 170 MG/DL (ref 70–110)
POCT GLUCOSE: 171 MG/DL (ref 70–110)
POCT GLUCOSE: 172 MG/DL (ref 70–110)
POCT GLUCOSE: 174 MG/DL (ref 70–110)
POCT GLUCOSE: 175 MG/DL (ref 70–110)
POCT GLUCOSE: 176 MG/DL (ref 70–110)
POCT GLUCOSE: 177 MG/DL (ref 70–110)
POCT GLUCOSE: 178 MG/DL (ref 70–110)
POCT GLUCOSE: 180 MG/DL (ref 70–110)
POCT GLUCOSE: 182 MG/DL (ref 70–110)
POCT GLUCOSE: 182 MG/DL (ref 70–110)
POCT GLUCOSE: 183 MG/DL (ref 70–110)
POCT GLUCOSE: 185 MG/DL (ref 70–110)
POCT GLUCOSE: 186 MG/DL (ref 70–110)
POCT GLUCOSE: 192 MG/DL (ref 70–110)
POCT GLUCOSE: 194 MG/DL (ref 70–110)
POCT GLUCOSE: 195 MG/DL (ref 70–110)
POCT GLUCOSE: 196 MG/DL (ref 70–110)
POCT GLUCOSE: 201 MG/DL (ref 70–110)
POCT GLUCOSE: 201 MG/DL (ref 70–110)
POCT GLUCOSE: 205 MG/DL (ref 70–110)
POCT GLUCOSE: 206 MG/DL (ref 70–110)
POCT GLUCOSE: 206 MG/DL (ref 70–110)
POCT GLUCOSE: 209 MG/DL (ref 70–110)
POCT GLUCOSE: 212 MG/DL (ref 70–110)
POCT GLUCOSE: 213 MG/DL (ref 70–110)
POCT GLUCOSE: 214 MG/DL (ref 70–110)
POCT GLUCOSE: 217 MG/DL (ref 70–110)
POCT GLUCOSE: 229 MG/DL (ref 70–110)
POCT GLUCOSE: 232 MG/DL (ref 70–110)
POCT GLUCOSE: 248 MG/DL (ref 70–110)
POCT GLUCOSE: 249 MG/DL (ref 70–110)
POCT GLUCOSE: 263 MG/DL (ref 70–110)
POCT GLUCOSE: 266 MG/DL (ref 70–110)
POCT GLUCOSE: 276 MG/DL (ref 70–110)
POCT GLUCOSE: 286 MG/DL (ref 70–110)
POCT GLUCOSE: 299 MG/DL (ref 70–110)
POCT GLUCOSE: 306 MG/DL (ref 70–110)
POCT GLUCOSE: 315 MG/DL (ref 70–110)
POCT GLUCOSE: 347 MG/DL (ref 70–110)
POCT GLUCOSE: 385 MG/DL (ref 70–110)
POCT GLUCOSE: 59 MG/DL (ref 70–110)
POCT GLUCOSE: 69 MG/DL (ref 70–110)
POCT GLUCOSE: 69 MG/DL (ref 70–110)
POCT GLUCOSE: 70 MG/DL (ref 70–110)
POCT GLUCOSE: 70 MG/DL (ref 70–110)
POCT GLUCOSE: 71 MG/DL (ref 70–110)
POCT GLUCOSE: 71 MG/DL (ref 70–110)
POCT GLUCOSE: 73 MG/DL (ref 70–110)
POCT GLUCOSE: 73 MG/DL (ref 70–110)
POCT GLUCOSE: 74 MG/DL (ref 70–110)
POCT GLUCOSE: 76 MG/DL (ref 70–110)
POCT GLUCOSE: 76 MG/DL (ref 70–110)
POCT GLUCOSE: 77 MG/DL (ref 70–110)
POCT GLUCOSE: 77 MG/DL (ref 70–110)
POCT GLUCOSE: 79 MG/DL (ref 70–110)
POCT GLUCOSE: 80 MG/DL (ref 70–110)
POCT GLUCOSE: 80 MG/DL (ref 70–110)
POCT GLUCOSE: 81 MG/DL (ref 70–110)
POCT GLUCOSE: 82 MG/DL (ref 70–110)
POCT GLUCOSE: 83 MG/DL (ref 70–110)
POCT GLUCOSE: 84 MG/DL (ref 70–110)
POCT GLUCOSE: 85 MG/DL (ref 70–110)
POCT GLUCOSE: 86 MG/DL (ref 70–110)
POCT GLUCOSE: 86 MG/DL (ref 70–110)
POCT GLUCOSE: 87 MG/DL (ref 70–110)
POCT GLUCOSE: 89 MG/DL (ref 70–110)
POCT GLUCOSE: 92 MG/DL (ref 70–110)
POCT GLUCOSE: 93 MG/DL (ref 70–110)
POCT GLUCOSE: 95 MG/DL (ref 70–110)
POCT GLUCOSE: 96 MG/DL (ref 70–110)
POIKILOCYTOSIS BLD QL SMEAR: SLIGHT
POLYCHROMASIA BLD QL SMEAR: ABNORMAL
POTASSIUM BLD-SCNC: 4.6 MMOL/L (ref 3.5–5.1)
POTASSIUM SERPL-SCNC: 3.3 MMOL/L (ref 3.5–5.1)
POTASSIUM SERPL-SCNC: 3.3 MMOL/L (ref 3.5–5.1)
POTASSIUM SERPL-SCNC: 3.4 MMOL/L (ref 3.5–5.1)
POTASSIUM SERPL-SCNC: 3.5 MMOL/L (ref 3.5–5.1)
POTASSIUM SERPL-SCNC: 3.6 MMOL/L (ref 3.5–5.1)
POTASSIUM SERPL-SCNC: 3.6 MMOL/L (ref 3.5–5.1)
POTASSIUM SERPL-SCNC: 3.7 MMOL/L (ref 3.5–5.1)
POTASSIUM SERPL-SCNC: 3.8 MMOL/L (ref 3.5–5.1)
POTASSIUM SERPL-SCNC: 3.9 MMOL/L (ref 3.5–5.1)
POTASSIUM SERPL-SCNC: 4 MMOL/L (ref 3.5–5.1)
POTASSIUM SERPL-SCNC: 4.1 MMOL/L (ref 3.5–5.1)
POTASSIUM SERPL-SCNC: 4.2 MMOL/L (ref 3.5–5.1)
POTASSIUM SERPL-SCNC: 4.3 MMOL/L (ref 3.5–5.1)
POTASSIUM SERPL-SCNC: 4.4 MMOL/L (ref 3.5–5.1)
POTASSIUM SERPL-SCNC: 4.5 MMOL/L (ref 3.5–5.1)
POTASSIUM SERPL-SCNC: 4.6 MMOL/L (ref 3.5–5.1)
POTASSIUM SERPL-SCNC: 4.6 MMOL/L (ref 3.5–5.1)
POTASSIUM SERPL-SCNC: 4.7 MMOL/L (ref 3.5–5.1)
POTASSIUM SERPL-SCNC: 4.7 MMOL/L (ref 3.5–5.1)
POTASSIUM SERPL-SCNC: 4.8 MMOL/L (ref 3.5–5.1)
POTASSIUM SERPL-SCNC: 4.9 MMOL/L (ref 3.5–5.1)
POTASSIUM SERPL-SCNC: 4.9 MMOL/L (ref 3.5–5.1)
POTASSIUM SERPL-SCNC: 5 MMOL/L (ref 3.5–5.1)
POTASSIUM SERPL-SCNC: 5.1 MMOL/L (ref 3.5–5.1)
POTASSIUM SERPL-SCNC: 5.7 MMOL/L (ref 3.5–5.1)
POTASSIUM SERPL-SCNC: 5.8 MMOL/L (ref 3.5–5.1)
POTASSIUM SERPL-SCNC: 5.8 MMOL/L (ref 3.5–5.1)
PREALB SERPL-MCNC: 7 MG/DL (ref 20–43)
PROCALCITONIN SERPL IA-MCNC: 0.27 NG/ML
PROCALCITONIN SERPL IA-MCNC: 0.81 NG/ML
PROCALCITONIN SERPL IA-MCNC: 2.24 NG/ML
PROCALCITONIN SERPL IA-MCNC: 3.52 NG/ML
PROCALCITONIN SERPL IA-MCNC: 5.39 NG/ML
PROCALCITONIN SERPL IA-MCNC: 5.68 NG/ML
PROT CSF-MCNC: 58 MG/DL (ref 15–40)
PROT SERPL-MCNC: 10.1 G/DL (ref 6–8.4)
PROT SERPL-MCNC: 10.5 G/DL (ref 6–8.4)
PROT SERPL-MCNC: 7.2 G/DL (ref 6–8.4)
PROT SERPL-MCNC: 7.4 G/DL (ref 6–8.4)
PROT SERPL-MCNC: 7.5 G/DL (ref 6–8.4)
PROT SERPL-MCNC: 7.6 G/DL (ref 6–8.4)
PROT SERPL-MCNC: 7.6 G/DL (ref 6–8.4)
PROT SERPL-MCNC: 7.7 G/DL (ref 6–8.4)
PROT SERPL-MCNC: 7.8 G/DL (ref 6–8.4)
PROT SERPL-MCNC: 7.8 G/DL (ref 6–8.4)
PROT SERPL-MCNC: 7.9 G/DL (ref 6–8.4)
PROT SERPL-MCNC: 8 G/DL (ref 6–8.4)
PROT SERPL-MCNC: 8.1 G/DL (ref 6–8.4)
PROT SERPL-MCNC: 8.1 G/DL (ref 6–8.4)
PROT SERPL-MCNC: 8.2 G/DL (ref 6–8.4)
PROT SERPL-MCNC: 8.3 G/DL (ref 6–8.4)
PROT SERPL-MCNC: 8.4 G/DL (ref 6–8.4)
PROT SERPL-MCNC: 8.5 G/DL (ref 6–8.4)
PROT SERPL-MCNC: 8.6 G/DL (ref 6–8.4)
PROT SERPL-MCNC: 8.7 G/DL (ref 6–8.4)
PROT SERPL-MCNC: 8.8 G/DL (ref 6–8.4)
PROT SERPL-MCNC: 8.9 G/DL (ref 6–8.4)
PROT SERPL-MCNC: 9 G/DL (ref 6–8.4)
PROT SERPL-MCNC: 9.6 G/DL (ref 6–8.4)
PROT SERPL-MCNC: 9.7 G/DL (ref 6–8.4)
PROT SERPL-MCNC: 9.7 G/DL (ref 6–8.4)
PROT UR QL STRIP: ABNORMAL
PROTHROMBIN TIME: 11 SEC (ref 9–12.5)
PROTHROMBIN TIME: 11.2 SEC (ref 9–12.5)
PROTHROMBIN TIME: 11.5 SEC (ref 9–12.5)
PROTHROMBIN TIME: 11.6 SEC (ref 9–12.5)
PROTHROMBIN TIME: 11.6 SEC (ref 9–12.5)
PROTHROMBIN TIME: 12 SEC (ref 9–12.5)
PROTHROMBIN TIME: 12 SEC (ref 9–12.5)
PROTHROMBIN TIME: 12.2 SEC (ref 9–12.5)
PROTHROMBIN TIME: 12.3 SEC (ref 9–12.5)
PROTHROMBIN TIME: 12.7 SEC (ref 9–12.5)
PROTHROMBIN TIME: 13.8 SEC (ref 9–12.5)
PS: 7
PS: 7
PTH-INTACT SERPL-MCNC: 600 PG/ML (ref 9–77)
PTH-INTACT SERPL-MCNC: 620 PG/ML (ref 9–77)
PULM VEIN S/D RATIO: 1.05
PV PEAK D VEL: 0.39 M/S
PV PEAK S VEL: 0.41 M/S
RA MAJOR: 4.32 CM
RA MAJOR: 4.92 CM
RA PRESSURE: 3 MMHG
RA PRESSURE: 3 MMHG
RA WIDTH: 2.87 CM
RA WIDTH: 3.65 CM
RBC # BLD AUTO: 2.36 M/UL (ref 4.6–6.2)
RBC # BLD AUTO: 2.51 M/UL (ref 4.6–6.2)
RBC # BLD AUTO: 2.54 M/UL (ref 4.6–6.2)
RBC # BLD AUTO: 2.57 M/UL (ref 4.6–6.2)
RBC # BLD AUTO: 2.59 M/UL (ref 4.6–6.2)
RBC # BLD AUTO: 2.6 M/UL (ref 4.6–6.2)
RBC # BLD AUTO: 2.6 M/UL (ref 4.6–6.2)
RBC # BLD AUTO: 2.66 M/UL (ref 4.6–6.2)
RBC # BLD AUTO: 2.69 M/UL (ref 4.6–6.2)
RBC # BLD AUTO: 2.76 M/UL (ref 4.6–6.2)
RBC # BLD AUTO: 2.8 M/UL (ref 4.6–6.2)
RBC # BLD AUTO: 2.81 M/UL (ref 4.6–6.2)
RBC # BLD AUTO: 2.82 M/UL (ref 4.6–6.2)
RBC # BLD AUTO: 2.85 M/UL (ref 4.6–6.2)
RBC # BLD AUTO: 2.86 M/UL (ref 4.6–6.2)
RBC # BLD AUTO: 2.86 M/UL (ref 4.6–6.2)
RBC # BLD AUTO: 2.88 M/UL (ref 4.6–6.2)
RBC # BLD AUTO: 2.91 M/UL (ref 4.6–6.2)
RBC # BLD AUTO: 2.93 M/UL (ref 4.6–6.2)
RBC # BLD AUTO: 2.97 M/UL (ref 4.6–6.2)
RBC # BLD AUTO: 3.05 M/UL (ref 4.6–6.2)
RBC # BLD AUTO: 3.08 M/UL (ref 4.6–6.2)
RBC # BLD AUTO: 3.11 M/UL (ref 4.6–6.2)
RBC # BLD AUTO: 3.26 M/UL (ref 4.6–6.2)
RBC # BLD AUTO: 3.28 M/UL (ref 4.6–6.2)
RBC # BLD AUTO: 3.29 M/UL (ref 4.6–6.2)
RBC # BLD AUTO: 3.34 M/UL (ref 4.6–6.2)
RBC # BLD AUTO: 3.48 M/UL (ref 4.6–6.2)
RBC # BLD AUTO: 3.51 M/UL (ref 4.6–6.2)
RBC # BLD AUTO: 3.56 M/UL (ref 4.6–6.2)
RBC # BLD AUTO: 3.57 M/UL (ref 4.6–6.2)
RBC # BLD AUTO: 3.57 M/UL (ref 4.6–6.2)
RBC # BLD AUTO: 3.63 M/UL (ref 4.6–6.2)
RBC # BLD AUTO: 3.73 M/UL (ref 4.6–6.2)
RBC # BLD AUTO: 3.8 M/UL (ref 4.6–6.2)
RBC # BLD AUTO: 3.82 M/UL (ref 4.6–6.2)
RBC # BLD AUTO: 3.83 M/UL (ref 4.6–6.2)
RBC # BLD AUTO: 3.87 M/UL (ref 4.6–6.2)
RBC # BLD AUTO: 3.89 M/UL (ref 4.6–6.2)
RBC # BLD AUTO: 3.9 M/UL (ref 4.6–6.2)
RBC # BLD AUTO: 3.92 M/UL (ref 4.6–6.2)
RBC # BLD AUTO: 3.95 M/UL (ref 4.6–6.2)
RBC # BLD AUTO: 3.96 M/UL (ref 4.6–6.2)
RBC # BLD AUTO: 3.99 M/UL (ref 4.6–6.2)
RBC # BLD AUTO: 4 M/UL (ref 4.6–6.2)
RBC # BLD AUTO: 4.01 M/UL (ref 4.6–6.2)
RBC # BLD AUTO: 4.01 M/UL (ref 4.6–6.2)
RBC # BLD AUTO: 4.03 M/UL (ref 4.6–6.2)
RBC # BLD AUTO: 4.03 M/UL (ref 4.6–6.2)
RBC # BLD AUTO: 4.06 M/UL (ref 4.6–6.2)
RBC # BLD AUTO: 4.07 M/UL (ref 4.6–6.2)
RBC # BLD AUTO: 4.09 M/UL (ref 4.6–6.2)
RBC # BLD AUTO: 4.16 M/UL (ref 4.6–6.2)
RBC # BLD AUTO: 4.21 M/UL (ref 4.6–6.2)
RBC # BLD AUTO: 4.22 M/UL (ref 4.6–6.2)
RBC # BLD AUTO: 4.27 M/UL (ref 4.6–6.2)
RBC # BLD AUTO: 4.33 M/UL (ref 4.6–6.2)
RBC # BLD AUTO: 4.37 M/UL (ref 4.6–6.2)
RBC # BLD AUTO: 4.4 M/UL (ref 4.6–6.2)
RBC # BLD AUTO: 4.43 M/UL (ref 4.6–6.2)
RBC # BLD AUTO: 4.48 M/UL (ref 4.6–6.2)
RBC # BLD AUTO: 4.51 M/UL (ref 4.6–6.2)
RBC # BLD AUTO: 4.57 M/UL (ref 4.6–6.2)
RBC # BLD AUTO: 4.66 M/UL (ref 4.6–6.2)
RBC # BLD AUTO: 4.69 M/UL (ref 4.6–6.2)
RBC # BLD AUTO: 4.74 M/UL (ref 4.6–6.2)
RBC # BLD AUTO: 4.78 M/UL (ref 4.6–6.2)
RBC # BLD AUTO: 4.81 M/UL (ref 4.6–6.2)
RBC # BLD AUTO: 4.86 M/UL (ref 4.6–6.2)
RBC # BLD AUTO: 4.9 M/UL (ref 4.6–6.2)
RBC # BLD AUTO: 5.03 M/UL (ref 4.6–6.2)
RBC # BLD AUTO: 5.14 M/UL (ref 4.6–6.2)
RBC # BLD AUTO: 5.28 M/UL (ref 4.6–6.2)
RBC # BLD AUTO: 5.32 M/UL (ref 4.6–6.2)
RBC # BLD AUTO: 5.32 M/UL (ref 4.6–6.2)
RBC # CSF: 159 /CU MM
RBC # CSF: 63 /CU MM
RBC #/AREA URNS AUTO: 65 /HPF (ref 0–4)
RH BLD: NORMAL
RIGHT VENTRICULAR END-DIASTOLIC DIMENSION: 3.22 CM
RIGHT VENTRICULAR END-DIASTOLIC DIMENSION: 3.26 CM
RPR SER QL: NORMAL
RV TISSUE DOPPLER FREE WALL SYSTOLIC VELOCITY 1 (APICAL 4 CHAMBER VIEW): 12.56 CM/S
RV TISSUE DOPPLER FREE WALL SYSTOLIC VELOCITY 1 (APICAL 4 CHAMBER VIEW): 15.11 CM/S
RVP - ADENOVIRUS: NOT DETECTED
RVP - HUMAN METAPNEUMOVIRUS (HMPV): NOT DETECTED
RVP - INFLUENZA A: NOT DETECTED
RVP - INFLUENZA B: NOT DETECTED
RVP - RESPIRATORY SYNCTIAL VIRUS (RSV) A: NOT DETECTED
RVP - RESPIRATORY VIRAL PANEL, SOURCE: NORMAL
RVP - RHINOVIRUS: NOT DETECTED
SAMPLE: ABNORMAL
SARS-COV-2 RDRP RESP QL NAA+PROBE: NEGATIVE
SARS-COV-2 RDRP RESP QL NAA+PROBE: POSITIVE
SARS-COV-2 RNA RESP QL NAA+PROBE: DETECTED
SARS-COV-2 RNA RESP QL NAA+PROBE: DETECTED
SINUS: 2.93 CM
SINUS: 3.24 CM
SITE: ABNORMAL
SODIUM BLD-SCNC: 141 MMOL/L (ref 136–145)
SODIUM SERPL-SCNC: 132 MMOL/L (ref 136–145)
SODIUM SERPL-SCNC: 132 MMOL/L (ref 136–145)
SODIUM SERPL-SCNC: 133 MMOL/L (ref 136–145)
SODIUM SERPL-SCNC: 134 MMOL/L (ref 136–145)
SODIUM SERPL-SCNC: 135 MMOL/L (ref 136–145)
SODIUM SERPL-SCNC: 136 MMOL/L (ref 136–145)
SODIUM SERPL-SCNC: 137 MMOL/L (ref 136–145)
SODIUM SERPL-SCNC: 138 MMOL/L (ref 136–145)
SODIUM SERPL-SCNC: 139 MMOL/L (ref 136–145)
SODIUM SERPL-SCNC: 140 MMOL/L (ref 136–145)
SODIUM SERPL-SCNC: 141 MMOL/L (ref 136–145)
SODIUM SERPL-SCNC: 143 MMOL/L (ref 136–145)
SODIUM SERPL-SCNC: 147 MMOL/L (ref 136–145)
SP GR UR STRIP: 1.01 (ref 1–1.03)
SP02: 97
SP02: 99
SPECIMEN SOURCE: NORMAL
SPECIMEN VOL CSF: 1.5 ML
SPECIMEN VOL CSF: 1.9 ML
SPHEROCYTES BLD QL SMEAR: ABNORMAL
STJ: 2.81 CM
STJ: 2.86 CM
TARGETS BLD QL SMEAR: ABNORMAL
TDI LATERAL: 0.12 M/S
TDI LATERAL: 0.12 M/S
TDI SEPTAL: 0.07 M/S
TDI SEPTAL: 0.1 M/S
TDI: 0.1 M/S
TDI: 0.11 M/S
TOXICOLOGY INFORMATION: NORMAL
TR MAX PG: 31 MMHG
TR MAX PG: 62 MMHG
TRANSFERRIN SERPL-MCNC: 81 MG/DL (ref 200–375)
TRICUSPID ANNULAR PLANE SYSTOLIC EXCURSION: 2.03 CM
TROPONIN I SERPL DL<=0.01 NG/ML-MCNC: 0.17 NG/ML (ref 0–0.03)
TROPONIN I SERPL DL<=0.01 NG/ML-MCNC: 0.21 NG/ML (ref 0–0.03)
TROPONIN I SERPL DL<=0.01 NG/ML-MCNC: 0.34 NG/ML (ref 0–0.03)
TROPONIN I SERPL DL<=0.01 NG/ML-MCNC: 0.35 NG/ML (ref 0–0.03)
TROPONIN I SERPL DL<=0.01 NG/ML-MCNC: 0.44 NG/ML (ref 0–0.03)
TROPONIN I SERPL DL<=0.01 NG/ML-MCNC: 0.52 NG/ML (ref 0–0.03)
TROPONIN I SERPL DL<=0.01 NG/ML-MCNC: 1.03 NG/ML (ref 0–0.03)
TROPONIN I SERPL DL<=0.01 NG/ML-MCNC: 1.34 NG/ML (ref 0–0.03)
TSH SERPL DL<=0.005 MIU/L-ACNC: 1.22 UIU/ML (ref 0.4–4)
TV REST PULMONARY ARTERY PRESSURE: 34 MMHG
TV REST PULMONARY ARTERY PRESSURE: 65 MMHG
URN SPEC COLLECT METH UR: ABNORMAL
VANCOMYCIN SERPL-MCNC: 15.6 UG/ML
VANCOMYCIN SERPL-MCNC: 19.1 UG/ML
VANCOMYCIN SERPL-MCNC: 19.1 UG/ML
VANCOMYCIN SERPL-MCNC: 19.8 UG/ML
VANCOMYCIN SERPL-MCNC: 25.2 UG/ML
VANCOMYCIN SERPL-MCNC: 25.3 UG/ML
VANCOMYCIN SERPL-MCNC: 26.4 UG/ML
VANCOMYCIN SERPL-MCNC: 28.5 UG/ML
VANCOMYCIN SERPL-MCNC: 29.1 UG/ML
VANCOMYCIN SERPL-MCNC: 35.3 UG/ML
VANCOMYCIN SERPL-MCNC: 35.4 UG/ML
VANCOMYCIN SERPL-MCNC: 45.9 UG/ML
VANCOMYCIN SERPL-MCNC: 7.8 UG/ML
VANCOMYCIN TROUGH SERPL-MCNC: 18.3 UG/ML (ref 10–22)
VANCOMYCIN TROUGH SERPL-MCNC: 26.2 UG/ML (ref 10–22)
VARICELLA ZOSTER BY PCR RESULT: NEGATIVE
VIT B1 BLD-MCNC: 61 UG/L (ref 38–122)
VT: 340
VT: 510
WBC # BLD AUTO: 1.52 K/UL (ref 3.9–12.7)
WBC # BLD AUTO: 10.24 K/UL (ref 3.9–12.7)
WBC # BLD AUTO: 10.3 K/UL (ref 3.9–12.7)
WBC # BLD AUTO: 10.31 K/UL (ref 3.9–12.7)
WBC # BLD AUTO: 10.44 K/UL (ref 3.9–12.7)
WBC # BLD AUTO: 10.51 K/UL (ref 3.9–12.7)
WBC # BLD AUTO: 11.03 K/UL (ref 3.9–12.7)
WBC # BLD AUTO: 11.04 K/UL (ref 3.9–12.7)
WBC # BLD AUTO: 11.26 K/UL (ref 3.9–12.7)
WBC # BLD AUTO: 11.32 K/UL (ref 3.9–12.7)
WBC # BLD AUTO: 11.71 K/UL (ref 3.9–12.7)
WBC # BLD AUTO: 12.05 K/UL (ref 3.9–12.7)
WBC # BLD AUTO: 12.1 K/UL (ref 3.9–12.7)
WBC # BLD AUTO: 12.25 K/UL (ref 3.9–12.7)
WBC # BLD AUTO: 13.07 K/UL (ref 3.9–12.7)
WBC # BLD AUTO: 13.48 K/UL (ref 3.9–12.7)
WBC # BLD AUTO: 14.2 K/UL (ref 3.9–12.7)
WBC # BLD AUTO: 14.47 K/UL (ref 3.9–12.7)
WBC # BLD AUTO: 15.6 K/UL (ref 3.9–12.7)
WBC # BLD AUTO: 15.73 K/UL (ref 3.9–12.7)
WBC # BLD AUTO: 23.75 K/UL (ref 3.9–12.7)
WBC # BLD AUTO: 3.78 K/UL (ref 3.9–12.7)
WBC # BLD AUTO: 4.39 K/UL (ref 3.9–12.7)
WBC # BLD AUTO: 4.52 K/UL (ref 3.9–12.7)
WBC # BLD AUTO: 4.59 K/UL (ref 3.9–12.7)
WBC # BLD AUTO: 5.05 K/UL (ref 3.9–12.7)
WBC # BLD AUTO: 5.21 K/UL (ref 3.9–12.7)
WBC # BLD AUTO: 5.3 K/UL (ref 3.9–12.7)
WBC # BLD AUTO: 5.31 K/UL (ref 3.9–12.7)
WBC # BLD AUTO: 5.35 K/UL (ref 3.9–12.7)
WBC # BLD AUTO: 5.53 K/UL (ref 3.9–12.7)
WBC # BLD AUTO: 5.88 K/UL (ref 3.9–12.7)
WBC # BLD AUTO: 5.92 K/UL (ref 3.9–12.7)
WBC # BLD AUTO: 6.02 K/UL (ref 3.9–12.7)
WBC # BLD AUTO: 6.09 K/UL (ref 3.9–12.7)
WBC # BLD AUTO: 6.11 K/UL (ref 3.9–12.7)
WBC # BLD AUTO: 6.13 K/UL (ref 3.9–12.7)
WBC # BLD AUTO: 6.13 K/UL (ref 3.9–12.7)
WBC # BLD AUTO: 6.17 K/UL (ref 3.9–12.7)
WBC # BLD AUTO: 6.36 K/UL (ref 3.9–12.7)
WBC # BLD AUTO: 6.41 K/UL (ref 3.9–12.7)
WBC # BLD AUTO: 6.55 K/UL (ref 3.9–12.7)
WBC # BLD AUTO: 6.72 K/UL (ref 3.9–12.7)
WBC # BLD AUTO: 6.75 K/UL (ref 3.9–12.7)
WBC # BLD AUTO: 6.96 K/UL (ref 3.9–12.7)
WBC # BLD AUTO: 7 K/UL (ref 3.9–12.7)
WBC # BLD AUTO: 7.16 K/UL (ref 3.9–12.7)
WBC # BLD AUTO: 7.23 K/UL (ref 3.9–12.7)
WBC # BLD AUTO: 7.28 K/UL (ref 3.9–12.7)
WBC # BLD AUTO: 7.3 K/UL (ref 3.9–12.7)
WBC # BLD AUTO: 7.31 K/UL (ref 3.9–12.7)
WBC # BLD AUTO: 7.35 K/UL (ref 3.9–12.7)
WBC # BLD AUTO: 7.5 K/UL (ref 3.9–12.7)
WBC # BLD AUTO: 7.51 K/UL (ref 3.9–12.7)
WBC # BLD AUTO: 7.52 K/UL (ref 3.9–12.7)
WBC # BLD AUTO: 7.53 K/UL (ref 3.9–12.7)
WBC # BLD AUTO: 7.58 K/UL (ref 3.9–12.7)
WBC # BLD AUTO: 7.64 K/UL (ref 3.9–12.7)
WBC # BLD AUTO: 7.79 K/UL (ref 3.9–12.7)
WBC # BLD AUTO: 8.25 K/UL (ref 3.9–12.7)
WBC # BLD AUTO: 8.51 K/UL (ref 3.9–12.7)
WBC # BLD AUTO: 8.66 K/UL (ref 3.9–12.7)
WBC # BLD AUTO: 8.71 K/UL (ref 3.9–12.7)
WBC # BLD AUTO: 8.71 K/UL (ref 3.9–12.7)
WBC # BLD AUTO: 8.78 K/UL (ref 3.9–12.7)
WBC # BLD AUTO: 8.89 K/UL (ref 3.9–12.7)
WBC # BLD AUTO: 8.9 K/UL (ref 3.9–12.7)
WBC # BLD AUTO: 8.95 K/UL (ref 3.9–12.7)
WBC # BLD AUTO: 8.96 K/UL (ref 3.9–12.7)
WBC # BLD AUTO: 9.03 K/UL (ref 3.9–12.7)
WBC # BLD AUTO: 9.07 K/UL (ref 3.9–12.7)
WBC # BLD AUTO: 9.16 K/UL (ref 3.9–12.7)
WBC # BLD AUTO: 9.32 K/UL (ref 3.9–12.7)
WBC # BLD AUTO: 9.41 K/UL (ref 3.9–12.7)
WBC # BLD AUTO: 9.52 K/UL (ref 3.9–12.7)
WBC # BLD AUTO: 9.58 K/UL (ref 3.9–12.7)
WBC # BLD AUTO: 9.62 K/UL (ref 3.9–12.7)
WBC # BLD AUTO: 9.64 K/UL (ref 3.9–12.7)
WBC # BLD AUTO: 9.68 K/UL (ref 3.9–12.7)
WBC # BLD AUTO: 9.69 K/UL (ref 3.9–12.7)
WBC # BLD AUTO: 9.71 K/UL (ref 3.9–12.7)
WBC # CSF: 3 /CU MM (ref 0–5)
WBC # CSF: 3 /CU MM (ref 0–5)
WBC #/AREA URNS AUTO: 4 /HPF (ref 0–5)
WNV RNA CSF QL NAA+PROBE: NEGATIVE

## 2020-01-01 PROCEDURE — 84100 ASSAY OF PHOSPHORUS: CPT

## 2020-01-01 PROCEDURE — 93005 ELECTROCARDIOGRAM TRACING: CPT

## 2020-01-01 PROCEDURE — 94640 AIRWAY INHALATION TREATMENT: CPT

## 2020-01-01 PROCEDURE — 63600175 PHARM REV CODE 636 W HCPCS: Performed by: EMERGENCY MEDICINE

## 2020-01-01 PROCEDURE — 25000003 PHARM REV CODE 250: Performed by: PHYSICIAN ASSISTANT

## 2020-01-01 PROCEDURE — 94761 N-INVAS EAR/PLS OXIMETRY MLT: CPT

## 2020-01-01 PROCEDURE — 85730 THROMBOPLASTIN TIME PARTIAL: CPT

## 2020-01-01 PROCEDURE — 99239 HOSP IP/OBS DSCHRG MGMT >30: CPT | Mod: ,,, | Performed by: HOSPITALIST

## 2020-01-01 PROCEDURE — 27200966 HC CLOSED SUCTION SYSTEM

## 2020-01-01 PROCEDURE — 80100014 HC HEMODIALYSIS 1:1

## 2020-01-01 PROCEDURE — 11000001 HC ACUTE MED/SURG PRIVATE ROOM

## 2020-01-01 PROCEDURE — 83605 ASSAY OF LACTIC ACID: CPT

## 2020-01-01 PROCEDURE — 63600175 PHARM REV CODE 636 W HCPCS: Performed by: INTERNAL MEDICINE

## 2020-01-01 PROCEDURE — G0378 HOSPITAL OBSERVATION PER HR: HCPCS

## 2020-01-01 PROCEDURE — 92526 ORAL FUNCTION THERAPY: CPT

## 2020-01-01 PROCEDURE — 63600175 PHARM REV CODE 636 W HCPCS: Performed by: NURSE PRACTITIONER

## 2020-01-01 PROCEDURE — 80202 ASSAY OF VANCOMYCIN: CPT

## 2020-01-01 PROCEDURE — 99900035 HC TECH TIME PER 15 MIN (STAT)

## 2020-01-01 PROCEDURE — 80047 BASIC METABLC PNL IONIZED CA: CPT

## 2020-01-01 PROCEDURE — 36905 PR MECH THROMBECTOMY/INFUS, DIALYSIS CIRCUIT W/ TRANSLML BALLOON ANGIO: ICD-10-PCS | Mod: GC,,, | Performed by: SURGERY

## 2020-01-01 PROCEDURE — 36907 BALO ANGIOP CTR DIALYSIS SEG: CPT | Performed by: SURGERY

## 2020-01-01 PROCEDURE — 99291 CRITICAL CARE FIRST HOUR: CPT | Mod: 25,,, | Performed by: EMERGENCY MEDICINE

## 2020-01-01 PROCEDURE — 99232 PR SUBSEQUENT HOSPITAL CARE,LEVL II: ICD-10-PCS | Mod: 95,,, | Performed by: INTERNAL MEDICINE

## 2020-01-01 PROCEDURE — 99232 PR SUBSEQUENT HOSPITAL CARE,LEVL II: ICD-10-PCS | Mod: ,,, | Performed by: HOSPITALIST

## 2020-01-01 PROCEDURE — 85610 PROTHROMBIN TIME: CPT

## 2020-01-01 PROCEDURE — 86140 C-REACTIVE PROTEIN: CPT

## 2020-01-01 PROCEDURE — 93010 EKG 12-LEAD: ICD-10-PCS | Mod: ,,, | Performed by: INTERNAL MEDICINE

## 2020-01-01 PROCEDURE — 97530 THERAPEUTIC ACTIVITIES: CPT | Mod: CQ

## 2020-01-01 PROCEDURE — 25000003 PHARM REV CODE 250: Performed by: INTERNAL MEDICINE

## 2020-01-01 PROCEDURE — 85025 COMPLETE CBC W/AUTO DIFF WBC: CPT

## 2020-01-01 PROCEDURE — 25000003 PHARM REV CODE 250: Performed by: NURSE PRACTITIONER

## 2020-01-01 PROCEDURE — 99239 PR HOSPITAL DISCHARGE DAY,>30 MIN: ICD-10-PCS | Mod: ,,, | Performed by: HOSPITALIST

## 2020-01-01 PROCEDURE — 99232 SBSQ HOSP IP/OBS MODERATE 35: CPT | Mod: ,,, | Performed by: NURSE PRACTITIONER

## 2020-01-01 PROCEDURE — 36905 THRMBC/NFS DIALYSIS CIRCUIT: CPT | Performed by: SURGERY

## 2020-01-01 PROCEDURE — 99214 OFFICE O/P EST MOD 30 MIN: CPT | Mod: ,,, | Performed by: NURSE PRACTITIONER

## 2020-01-01 PROCEDURE — 99285 EMERGENCY DEPT VISIT HI MDM: CPT | Mod: 25

## 2020-01-01 PROCEDURE — 31720 CLEARANCE OF AIRWAYS: CPT

## 2020-01-01 PROCEDURE — 99000 SPECIMEN HANDLING OFFICE-LAB: CPT

## 2020-01-01 PROCEDURE — 99233 PR SUBSEQUENT HOSPITAL CARE,LEVL III: ICD-10-PCS | Mod: ,,, | Performed by: HOSPITALIST

## 2020-01-01 PROCEDURE — 36415 COLL VENOUS BLD VENIPUNCTURE: CPT

## 2020-01-01 PROCEDURE — 83615 LACTATE (LD) (LDH) ENZYME: CPT

## 2020-01-01 PROCEDURE — 63600175 PHARM REV CODE 636 W HCPCS: Performed by: PHYSICIAN ASSISTANT

## 2020-01-01 PROCEDURE — C9113 INJ PANTOPRAZOLE SODIUM, VIA: HCPCS | Performed by: STUDENT IN AN ORGANIZED HEALTH CARE EDUCATION/TRAINING PROGRAM

## 2020-01-01 PROCEDURE — 43239 PR EGD, FLEX, W/BIOPSY, SGL/MULTI: ICD-10-PCS | Mod: GC,,, | Performed by: INTERNAL MEDICINE

## 2020-01-01 PROCEDURE — 96368 THER/DIAG CONCURRENT INF: CPT | Mod: 59

## 2020-01-01 PROCEDURE — 27000646 HC AEROBIKA DEVICE

## 2020-01-01 PROCEDURE — 43239 EGD BIOPSY SINGLE/MULTIPLE: CPT | Performed by: INTERNAL MEDICINE

## 2020-01-01 PROCEDURE — 63600175 PHARM REV CODE 636 W HCPCS: Performed by: HOSPITALIST

## 2020-01-01 PROCEDURE — 99233 PR SUBSEQUENT HOSPITAL CARE,LEVL III: ICD-10-PCS | Mod: GC,,, | Performed by: INTERNAL MEDICINE

## 2020-01-01 PROCEDURE — 97530 THERAPEUTIC ACTIVITIES: CPT

## 2020-01-01 PROCEDURE — 25000003 PHARM REV CODE 250: Performed by: STUDENT IN AN ORGANIZED HEALTH CARE EDUCATION/TRAINING PROGRAM

## 2020-01-01 PROCEDURE — 99291 CRITICAL CARE FIRST HOUR: CPT | Mod: ,,, | Performed by: PHYSICIAN ASSISTANT

## 2020-01-01 PROCEDURE — C1751 CATH, INF, PER/CENT/MIDLINE: HCPCS

## 2020-01-01 PROCEDURE — 90935 HEMODIALYSIS ONE EVALUATION: CPT | Mod: ,,, | Performed by: NURSE PRACTITIONER

## 2020-01-01 PROCEDURE — 80053 COMPREHEN METABOLIC PANEL: CPT

## 2020-01-01 PROCEDURE — 25000003 PHARM REV CODE 250: Performed by: HOSPITALIST

## 2020-01-01 PROCEDURE — 90935 HEMODIALYSIS ONE EVALUATION: CPT | Mod: ,,, | Performed by: INTERNAL MEDICINE

## 2020-01-01 PROCEDURE — 93010 ELECTROCARDIOGRAM REPORT: CPT | Mod: ,,, | Performed by: INTERNAL MEDICINE

## 2020-01-01 PROCEDURE — 83690 ASSAY OF LIPASE: CPT

## 2020-01-01 PROCEDURE — 86850 RBC ANTIBODY SCREEN: CPT

## 2020-01-01 PROCEDURE — 85025 COMPLETE CBC W/AUTO DIFF WBC: CPT | Mod: 91

## 2020-01-01 PROCEDURE — 85379 FIBRIN DEGRADATION QUANT: CPT

## 2020-01-01 PROCEDURE — 97168 OT RE-EVAL EST PLAN CARE: CPT

## 2020-01-01 PROCEDURE — 83735 ASSAY OF MAGNESIUM: CPT

## 2020-01-01 PROCEDURE — 63600175 PHARM REV CODE 636 W HCPCS: Performed by: STUDENT IN AN ORGANIZED HEALTH CARE EDUCATION/TRAINING PROGRAM

## 2020-01-01 PROCEDURE — 82550 ASSAY OF CK (CPK): CPT

## 2020-01-01 PROCEDURE — 85384 FIBRINOGEN ACTIVITY: CPT

## 2020-01-01 PROCEDURE — 99232 PR SUBSEQUENT HOSPITAL CARE,LEVL II: ICD-10-PCS | Mod: ,,, | Performed by: INTERNAL MEDICINE

## 2020-01-01 PROCEDURE — 27000221 HC OXYGEN, UP TO 24 HOURS

## 2020-01-01 PROCEDURE — 87798 DETECT AGENT NOS DNA AMP: CPT

## 2020-01-01 PROCEDURE — 95714 VEEG EA 12-26 HR UNMNTR: CPT

## 2020-01-01 PROCEDURE — 84100 ASSAY OF PHOSPHORUS: CPT | Mod: 91

## 2020-01-01 PROCEDURE — 99232 PR SUBSEQUENT HOSPITAL CARE,LEVL II: ICD-10-PCS | Mod: ,,, | Performed by: SURGERY

## 2020-01-01 PROCEDURE — 94668 MNPJ CHEST WALL SBSQ: CPT

## 2020-01-01 PROCEDURE — 84145 PROCALCITONIN (PCT): CPT

## 2020-01-01 PROCEDURE — 25000003 PHARM REV CODE 250

## 2020-01-01 PROCEDURE — 25000242 PHARM REV CODE 250 ALT 637 W/ HCPCS: Performed by: HOSPITALIST

## 2020-01-01 PROCEDURE — 99152 MOD SED SAME PHYS/QHP 5/>YRS: CPT | Performed by: SURGERY

## 2020-01-01 PROCEDURE — 99284 EMERGENCY DEPT VISIT MOD MDM: CPT | Mod: ,,, | Performed by: EMERGENCY MEDICINE

## 2020-01-01 PROCEDURE — 96375 TX/PRO/DX INJ NEW DRUG ADDON: CPT

## 2020-01-01 PROCEDURE — C1894 INTRO/SHEATH, NON-LASER: HCPCS | Performed by: SURGERY

## 2020-01-01 PROCEDURE — 87040 BLOOD CULTURE FOR BACTERIA: CPT | Mod: 59

## 2020-01-01 PROCEDURE — 95822 PR EEG,COMA/SLEEP RECORD ONLY: ICD-10-PCS | Mod: 26,,, | Performed by: PSYCHIATRY & NEUROLOGY

## 2020-01-01 PROCEDURE — 63600175 PHARM REV CODE 636 W HCPCS: Performed by: SURGERY

## 2020-01-01 PROCEDURE — 84484 ASSAY OF TROPONIN QUANT: CPT | Mod: 91

## 2020-01-01 PROCEDURE — 84134 ASSAY OF PREALBUMIN: CPT

## 2020-01-01 PROCEDURE — 95822 EEG COMA OR SLEEP ONLY: CPT

## 2020-01-01 PROCEDURE — 99232 SBSQ HOSP IP/OBS MODERATE 35: CPT | Mod: 95,,, | Performed by: INTERNAL MEDICINE

## 2020-01-01 PROCEDURE — 99215 OFFICE O/P EST HI 40 MIN: CPT | Mod: ,,, | Performed by: NURSE PRACTITIONER

## 2020-01-01 PROCEDURE — 62270 DX LMBR SPI PNXR: CPT

## 2020-01-01 PROCEDURE — 85730 THROMBOPLASTIN TIME PARTIAL: CPT | Mod: 91

## 2020-01-01 PROCEDURE — 94664 DEMO&/EVAL PT USE INHALER: CPT

## 2020-01-01 PROCEDURE — 88305 TISSUE EXAM BY PATHOLOGIST: CPT | Mod: 26,,, | Performed by: PATHOLOGY

## 2020-01-01 PROCEDURE — 51702 INSERT TEMP BLADDER CATH: CPT

## 2020-01-01 PROCEDURE — 85027 COMPLETE CBC AUTOMATED: CPT

## 2020-01-01 PROCEDURE — 94003 VENT MGMT INPAT SUBQ DAY: CPT

## 2020-01-01 PROCEDURE — 83036 HEMOGLOBIN GLYCOSYLATED A1C: CPT

## 2020-01-01 PROCEDURE — 84295 ASSAY OF SERUM SODIUM: CPT

## 2020-01-01 PROCEDURE — 20000000 HC ICU ROOM

## 2020-01-01 PROCEDURE — 99223 PR INITIAL HOSPITAL CARE,LEVL III: ICD-10-PCS | Mod: ,,, | Performed by: NURSE PRACTITIONER

## 2020-01-01 PROCEDURE — 80100016 HC MAINTENANCE HEMODIALYSIS

## 2020-01-01 PROCEDURE — P9016 RBC LEUKOCYTES REDUCED: HCPCS

## 2020-01-01 PROCEDURE — 99232 SBSQ HOSP IP/OBS MODERATE 35: CPT | Mod: ,,, | Performed by: HOSPITALIST

## 2020-01-01 PROCEDURE — 99223 PR INITIAL HOSPITAL CARE,LEVL III: ICD-10-PCS | Mod: ,,, | Performed by: SURGERY

## 2020-01-01 PROCEDURE — 86900 BLOOD TYPING SEROLOGIC ABO: CPT

## 2020-01-01 PROCEDURE — 95720 PR EEG, W/VIDEO, CONT RECORD, I&R, >12<26 HRS: ICD-10-PCS | Mod: ,,, | Performed by: PSYCHIATRY & NEUROLOGY

## 2020-01-01 PROCEDURE — 99291 PR CRITICAL CARE, E/M 30-74 MINUTES: ICD-10-PCS | Mod: ,,, | Performed by: INTERNAL MEDICINE

## 2020-01-01 PROCEDURE — 87040 BLOOD CULTURE FOR BACTERIA: CPT

## 2020-01-01 PROCEDURE — 87186 SC STD MICRODIL/AGAR DIL: CPT

## 2020-01-01 PROCEDURE — 31500 INSERT EMERGENCY AIRWAY: CPT | Mod: GC,,, | Performed by: EMERGENCY MEDICINE

## 2020-01-01 PROCEDURE — 97802 MEDICAL NUTRITION INDIV IN: CPT

## 2020-01-01 PROCEDURE — 99223 PR INITIAL HOSPITAL CARE,LEVL III: ICD-10-PCS | Mod: ,,, | Performed by: HOSPITALIST

## 2020-01-01 PROCEDURE — 93005 ELECTROCARDIOGRAM TRACING: CPT | Performed by: INTERNAL MEDICINE

## 2020-01-01 PROCEDURE — 99233 SBSQ HOSP IP/OBS HIGH 50: CPT | Mod: ,,, | Performed by: NURSE PRACTITIONER

## 2020-01-01 PROCEDURE — 96366 THER/PROPH/DIAG IV INF ADDON: CPT | Mod: 59

## 2020-01-01 PROCEDURE — 85007 BL SMEAR W/DIFF WBC COUNT: CPT

## 2020-01-01 PROCEDURE — U0002 COVID-19 LAB TEST NON-CDC: HCPCS

## 2020-01-01 PROCEDURE — 25000003 PHARM REV CODE 250: Performed by: EMERGENCY MEDICINE

## 2020-01-01 PROCEDURE — C9113 INJ PANTOPRAZOLE SODIUM, VIA: HCPCS | Performed by: EMERGENCY MEDICINE

## 2020-01-01 PROCEDURE — 82728 ASSAY OF FERRITIN: CPT

## 2020-01-01 PROCEDURE — 94799 UNLISTED PULMONARY SVC/PX: CPT

## 2020-01-01 PROCEDURE — 99233 SBSQ HOSP IP/OBS HIGH 50: CPT | Mod: ,,, | Performed by: HOSPITALIST

## 2020-01-01 PROCEDURE — 99900026 HC AIRWAY MAINTENANCE (STAT)

## 2020-01-01 PROCEDURE — 85018 HEMOGLOBIN: CPT

## 2020-01-01 PROCEDURE — 90935 PR HEMODIALYSIS, ONE EVALUATION: ICD-10-PCS | Mod: ,,, | Performed by: NURSE PRACTITIONER

## 2020-01-01 PROCEDURE — 99291 CRITICAL CARE FIRST HOUR: CPT

## 2020-01-01 PROCEDURE — 80069 RENAL FUNCTION PANEL: CPT

## 2020-01-01 PROCEDURE — 80069 RENAL FUNCTION PANEL: CPT | Mod: 91

## 2020-01-01 PROCEDURE — 82140 ASSAY OF AMMONIA: CPT

## 2020-01-01 PROCEDURE — 36600 WITHDRAWAL OF ARTERIAL BLOOD: CPT

## 2020-01-01 PROCEDURE — 96366 THER/PROPH/DIAG IV INF ADDON: CPT

## 2020-01-01 PROCEDURE — 99291 PR CRITICAL CARE, E/M 30-74 MINUTES: ICD-10-PCS | Mod: ,,, | Performed by: NURSE PRACTITIONER

## 2020-01-01 PROCEDURE — 25000242 PHARM REV CODE 250 ALT 637 W/ HCPCS: Performed by: PHYSICIAN ASSISTANT

## 2020-01-01 PROCEDURE — 95720 EEG PHY/QHP EA INCR W/VEEG: CPT | Mod: ,,, | Performed by: PSYCHIATRY & NEUROLOGY

## 2020-01-01 PROCEDURE — 83880 ASSAY OF NATRIURETIC PEPTIDE: CPT

## 2020-01-01 PROCEDURE — 80053 COMPREHEN METABOLIC PANEL: CPT | Mod: 91

## 2020-01-01 PROCEDURE — 99291 PR CRITICAL CARE, E/M 30-74 MINUTES: ICD-10-PCS | Mod: GC,,, | Performed by: INTERNAL MEDICINE

## 2020-01-01 PROCEDURE — 96376 TX/PRO/DX INJ SAME DRUG ADON: CPT

## 2020-01-01 PROCEDURE — 99291 CRITICAL CARE FIRST HOUR: CPT | Mod: ,,, | Performed by: INTERNAL MEDICINE

## 2020-01-01 PROCEDURE — 99233 SBSQ HOSP IP/OBS HIGH 50: CPT | Mod: ,,, | Performed by: INTERNAL MEDICINE

## 2020-01-01 PROCEDURE — C9113 INJ PANTOPRAZOLE SODIUM, VIA: HCPCS | Performed by: PHYSICIAN ASSISTANT

## 2020-01-01 PROCEDURE — 97803 MED NUTRITION INDIV SUBSEQ: CPT

## 2020-01-01 PROCEDURE — 99291 CRITICAL CARE FIRST HOUR: CPT | Mod: GC,,, | Performed by: INTERNAL MEDICINE

## 2020-01-01 PROCEDURE — 51701 INSERT BLADDER CATHETER: CPT

## 2020-01-01 PROCEDURE — 99284 PR EMERGENCY DEPT VISIT,LEVEL IV: ICD-10-PCS | Mod: ,,, | Performed by: EMERGENCY MEDICINE

## 2020-01-01 PROCEDURE — 31500 INSERT EMERGENCY AIRWAY: CPT

## 2020-01-01 PROCEDURE — 83735 ASSAY OF MAGNESIUM: CPT | Mod: 91

## 2020-01-01 PROCEDURE — 97112 NEUROMUSCULAR REEDUCATION: CPT | Mod: CQ

## 2020-01-01 PROCEDURE — 87205 SMEAR GRAM STAIN: CPT

## 2020-01-01 PROCEDURE — 99223 1ST HOSP IP/OBS HIGH 75: CPT | Mod: ,,, | Performed by: NURSE PRACTITIONER

## 2020-01-01 PROCEDURE — 99231 SBSQ HOSP IP/OBS SF/LOW 25: CPT | Mod: ,,, | Performed by: INTERNAL MEDICINE

## 2020-01-01 PROCEDURE — 99291 CRITICAL CARE FIRST HOUR: CPT | Mod: ,,, | Performed by: NURSE PRACTITIONER

## 2020-01-01 PROCEDURE — 87070 CULTURE OTHR SPECIMN AEROBIC: CPT

## 2020-01-01 PROCEDURE — 99222 PR INITIAL HOSPITAL CARE,LEVL II: ICD-10-PCS | Mod: GC,,, | Performed by: INTERNAL MEDICINE

## 2020-01-01 PROCEDURE — 99233 SBSQ HOSP IP/OBS HIGH 50: CPT | Mod: GC,,, | Performed by: INTERNAL MEDICINE

## 2020-01-01 PROCEDURE — 99291 PR CRITICAL CARE, E/M 30-74 MINUTES: ICD-10-PCS | Mod: 25,,, | Performed by: EMERGENCY MEDICINE

## 2020-01-01 PROCEDURE — 87502 INFLUENZA DNA AMP PROBE: CPT

## 2020-01-01 PROCEDURE — 76937 US GUIDE VASCULAR ACCESS: CPT

## 2020-01-01 PROCEDURE — 95718 EEG PHYS/QHP 2-12 HR W/VEEG: CPT | Mod: ,,, | Performed by: PSYCHIATRY & NEUROLOGY

## 2020-01-01 PROCEDURE — 97164 PT RE-EVAL EST PLAN CARE: CPT

## 2020-01-01 PROCEDURE — 25500020 PHARM REV CODE 255: Performed by: INTERNAL MEDICINE

## 2020-01-01 PROCEDURE — 99232 SBSQ HOSP IP/OBS MODERATE 35: CPT | Mod: ,,, | Performed by: PSYCHIATRY & NEUROLOGY

## 2020-01-01 PROCEDURE — 84484 ASSAY OF TROPONIN QUANT: CPT

## 2020-01-01 PROCEDURE — 99223 1ST HOSP IP/OBS HIGH 75: CPT | Mod: ,,, | Performed by: HOSPITALIST

## 2020-01-01 PROCEDURE — 99232 PR SUBSEQUENT HOSPITAL CARE,LEVL II: ICD-10-PCS | Mod: ,,, | Performed by: PSYCHIATRY & NEUROLOGY

## 2020-01-01 PROCEDURE — 99232 PR SUBSEQUENT HOSPITAL CARE,LEVL II: ICD-10-PCS | Mod: ,,, | Performed by: NURSE PRACTITIONER

## 2020-01-01 PROCEDURE — 99238 HOSP IP/OBS DSCHRG MGMT 30/<: CPT | Mod: ,,, | Performed by: INTERNAL MEDICINE

## 2020-01-01 PROCEDURE — 82962 GLUCOSE BLOOD TEST: CPT

## 2020-01-01 PROCEDURE — 97163 PT EVAL HIGH COMPLEX 45 MIN: CPT

## 2020-01-01 PROCEDURE — 83970 ASSAY OF PARATHORMONE: CPT

## 2020-01-01 PROCEDURE — 90935 HEMODIALYSIS ONE EVALUATION: CPT

## 2020-01-01 PROCEDURE — 99214 PR OFFICE/OUTPT VISIT, EST, LEVL IV, 30-39 MIN: ICD-10-PCS | Mod: ,,, | Performed by: NURSE PRACTITIONER

## 2020-01-01 PROCEDURE — 84157 ASSAY OF PROTEIN OTHER: CPT

## 2020-01-01 PROCEDURE — 99233 PR SUBSEQUENT HOSPITAL CARE,LEVL III: ICD-10-PCS | Mod: ,,, | Performed by: NURSE PRACTITIONER

## 2020-01-01 PROCEDURE — 99232 SBSQ HOSP IP/OBS MODERATE 35: CPT | Mod: ,,, | Performed by: INTERNAL MEDICINE

## 2020-01-01 PROCEDURE — 84466 ASSAY OF TRANSFERRIN: CPT

## 2020-01-01 PROCEDURE — 36430 TRANSFUSION BLD/BLD COMPNT: CPT

## 2020-01-01 PROCEDURE — 25000003 PHARM REV CODE 250: Performed by: NURSE ANESTHETIST, CERTIFIED REGISTERED

## 2020-01-01 PROCEDURE — 80320 DRUG SCREEN QUANTALCOHOLS: CPT

## 2020-01-01 PROCEDURE — 99214 OFFICE O/P EST MOD 30 MIN: CPT | Mod: ,,, | Performed by: INTERNAL MEDICINE

## 2020-01-01 PROCEDURE — C1725 CATH, TRANSLUMIN NON-LASER: HCPCS | Performed by: SURGERY

## 2020-01-01 PROCEDURE — 82803 BLOOD GASES ANY COMBINATION: CPT

## 2020-01-01 PROCEDURE — G0257 UNSCHED DIALYSIS ESRD PT HOS: HCPCS

## 2020-01-01 PROCEDURE — 99222 1ST HOSP IP/OBS MODERATE 55: CPT | Mod: GC,,, | Performed by: INTERNAL MEDICINE

## 2020-01-01 PROCEDURE — 99285 PR EMERGENCY DEPT VISIT,LEVEL V: ICD-10-PCS | Mod: ,,, | Performed by: EMERGENCY MEDICINE

## 2020-01-01 PROCEDURE — 93010 ELECTROCARDIOGRAM REPORT: CPT | Mod: 76,,, | Performed by: INTERNAL MEDICINE

## 2020-01-01 PROCEDURE — 99231 PR SUBSEQUENT HOSPITAL CARE,LEVL I: ICD-10-PCS | Mod: ,,, | Performed by: INTERNAL MEDICINE

## 2020-01-01 PROCEDURE — 88305 TISSUE EXAM BY PATHOLOGIST: CPT | Performed by: PATHOLOGY

## 2020-01-01 PROCEDURE — C1757 CATH, THROMBECTOMY/EMBOLECT: HCPCS | Performed by: SURGERY

## 2020-01-01 PROCEDURE — 96365 THER/PROPH/DIAG IV INF INIT: CPT

## 2020-01-01 PROCEDURE — 80074 ACUTE HEPATITIS PANEL: CPT

## 2020-01-01 PROCEDURE — 99291 CRITICAL CARE FIRST HOUR: CPT | Mod: ,,, | Performed by: EMERGENCY MEDICINE

## 2020-01-01 PROCEDURE — 27100245 HC EEG CAPS, DISPOSABLE

## 2020-01-01 PROCEDURE — 99226 PR SUBSEQUENT OBSERVATION CARE,LEVEL III: ICD-10-PCS | Mod: ,,, | Performed by: PHYSICIAN ASSISTANT

## 2020-01-01 PROCEDURE — 88305 TISSUE EXAM BY PATHOLOGIST: ICD-10-PCS | Mod: 26,,, | Performed by: PATHOLOGY

## 2020-01-01 PROCEDURE — 63600175 PHARM REV CODE 636 W HCPCS: Performed by: RADIOLOGY

## 2020-01-01 PROCEDURE — 97112 NEUROMUSCULAR REEDUCATION: CPT

## 2020-01-01 PROCEDURE — 25500020 PHARM REV CODE 255: Performed by: HOSPITALIST

## 2020-01-01 PROCEDURE — 80177 DRUG SCRN QUAN LEVETIRACETAM: CPT

## 2020-01-01 PROCEDURE — 87077 CULTURE AEROBIC IDENTIFY: CPT

## 2020-01-01 PROCEDURE — 82550 ASSAY OF CK (CPK): CPT | Mod: 91

## 2020-01-01 PROCEDURE — 36905 THRMBC/NFS DIALYSIS CIRCUIT: CPT | Mod: GC,,, | Performed by: SURGERY

## 2020-01-01 PROCEDURE — 43239 EGD BIOPSY SINGLE/MULTIPLE: CPT | Mod: GC,,, | Performed by: INTERNAL MEDICINE

## 2020-01-01 PROCEDURE — 96374 THER/PROPH/DIAG INJ IV PUSH: CPT

## 2020-01-01 PROCEDURE — 82552 ASSAY OF CPK IN BLOOD: CPT

## 2020-01-01 PROCEDURE — 86920 COMPATIBILITY TEST SPIN: CPT

## 2020-01-01 PROCEDURE — 99223 1ST HOSP IP/OBS HIGH 75: CPT | Mod: ,,, | Performed by: CLINICAL NURSE SPECIALIST

## 2020-01-01 PROCEDURE — 86901 BLOOD TYPING SEROLOGIC RH(D): CPT

## 2020-01-01 PROCEDURE — 99283 EMERGENCY DEPT VISIT LOW MDM: CPT | Mod: ,,, | Performed by: EMERGENCY MEDICINE

## 2020-01-01 PROCEDURE — 99232 SBSQ HOSP IP/OBS MODERATE 35: CPT | Mod: ,,, | Performed by: SURGERY

## 2020-01-01 PROCEDURE — 81001 URINALYSIS AUTO W/SCOPE: CPT

## 2020-01-01 PROCEDURE — A9698 NON-RAD CONTRAST MATERIALNOC: HCPCS | Performed by: STUDENT IN AN ORGANIZED HEALTH CARE EDUCATION/TRAINING PROGRAM

## 2020-01-01 PROCEDURE — 27100098 HC SPACER

## 2020-01-01 PROCEDURE — 99233 PR SUBSEQUENT HOSPITAL CARE,LEVL III: ICD-10-PCS | Mod: GC,,, | Performed by: PSYCHIATRY & NEUROLOGY

## 2020-01-01 PROCEDURE — 37000009 HC ANESTHESIA EA ADD 15 MINS: Performed by: INTERNAL MEDICINE

## 2020-01-01 PROCEDURE — 99233 PR SUBSEQUENT HOSPITAL CARE,LEVL III: ICD-10-PCS | Mod: ,,, | Performed by: INTERNAL MEDICINE

## 2020-01-01 PROCEDURE — D9220A PRA ANESTHESIA: Mod: ANES,,, | Performed by: ANESTHESIOLOGY

## 2020-01-01 PROCEDURE — C9113 INJ PANTOPRAZOLE SODIUM, VIA: HCPCS | Performed by: HOSPITALIST

## 2020-01-01 PROCEDURE — 36410 VNPNXR 3YR/> PHY/QHP DX/THER: CPT

## 2020-01-01 PROCEDURE — 96372 THER/PROPH/DIAG INJ SC/IM: CPT

## 2020-01-01 PROCEDURE — 25500020 PHARM REV CODE 255: Performed by: STUDENT IN AN ORGANIZED HEALTH CARE EDUCATION/TRAINING PROGRAM

## 2020-01-01 PROCEDURE — 99215 PR OFFICE/OUTPT VISIT, EST, LEVL V, 40-54 MIN: ICD-10-PCS | Mod: ,,, | Performed by: NURSE PRACTITIONER

## 2020-01-01 PROCEDURE — 95700 EEG CONT REC W/VID EEG TECH: CPT

## 2020-01-01 PROCEDURE — 99223 PR INITIAL HOSPITAL CARE,LEVL III: ICD-10-PCS | Mod: ,,, | Performed by: CLINICAL NURSE SPECIALIST

## 2020-01-01 PROCEDURE — C1769 GUIDE WIRE: HCPCS | Performed by: SURGERY

## 2020-01-01 PROCEDURE — D9220A PRA ANESTHESIA: ICD-10-PCS | Mod: CRNA,,, | Performed by: NURSE ANESTHETIST, CERTIFIED REGISTERED

## 2020-01-01 PROCEDURE — 97166 OT EVAL MOD COMPLEX 45 MIN: CPT

## 2020-01-01 PROCEDURE — 83605 ASSAY OF LACTIC ACID: CPT | Mod: 91

## 2020-01-01 PROCEDURE — 94002 VENT MGMT INPAT INIT DAY: CPT

## 2020-01-01 PROCEDURE — 97110 THERAPEUTIC EXERCISES: CPT | Mod: CQ

## 2020-01-01 PROCEDURE — 97535 SELF CARE MNGMENT TRAINING: CPT

## 2020-01-01 PROCEDURE — 99223 1ST HOSP IP/OBS HIGH 75: CPT | Mod: ,,, | Performed by: PHYSICIAN ASSISTANT

## 2020-01-01 PROCEDURE — 99232 PR SUBSEQUENT HOSPITAL CARE,LEVL II: ICD-10-PCS | Mod: GC,,, | Performed by: INTERNAL MEDICINE

## 2020-01-01 PROCEDURE — 99222 1ST HOSP IP/OBS MODERATE 55: CPT | Mod: ,,, | Performed by: PSYCHIATRY & NEUROLOGY

## 2020-01-01 PROCEDURE — 63600175 PHARM REV CODE 636 W HCPCS: Performed by: NURSE ANESTHETIST, CERTIFIED REGISTERED

## 2020-01-01 PROCEDURE — 99291 PR CRITICAL CARE, E/M 30-74 MINUTES: ICD-10-PCS | Mod: ,,, | Performed by: EMERGENCY MEDICINE

## 2020-01-01 PROCEDURE — 96367 TX/PROPH/DG ADDL SEQ IV INF: CPT

## 2020-01-01 PROCEDURE — 99222 PR INITIAL HOSPITAL CARE,LEVL II: ICD-10-PCS | Mod: ,,, | Performed by: PSYCHIATRY & NEUROLOGY

## 2020-01-01 PROCEDURE — 25500020 PHARM REV CODE 255: Performed by: SURGERY

## 2020-01-01 PROCEDURE — 99233 SBSQ HOSP IP/OBS HIGH 50: CPT | Mod: GC,,, | Performed by: PSYCHIATRY & NEUROLOGY

## 2020-01-01 PROCEDURE — 99223 PR INITIAL HOSPITAL CARE,LEVL III: ICD-10-PCS | Mod: ,,, | Performed by: PHYSICIAN ASSISTANT

## 2020-01-01 PROCEDURE — 95718 PR EEG, W/VIDEO, CONT RECORD, I&R, 2-12 HRS: ICD-10-PCS | Mod: ,,, | Performed by: PSYCHIATRY & NEUROLOGY

## 2020-01-01 PROCEDURE — 99291 CRITICAL CARE FIRST HOUR: CPT | Mod: 25

## 2020-01-01 PROCEDURE — C9399 UNCLASSIFIED DRUGS OR BIOLOG: HCPCS | Performed by: INTERNAL MEDICINE

## 2020-01-01 PROCEDURE — 99284 EMERGENCY DEPT VISIT MOD MDM: CPT | Mod: 25

## 2020-01-01 PROCEDURE — D9220A PRA ANESTHESIA: Mod: CRNA,,, | Performed by: NURSE ANESTHETIST, CERTIFIED REGISTERED

## 2020-01-01 PROCEDURE — 84443 ASSAY THYROID STIM HORMONE: CPT

## 2020-01-01 PROCEDURE — 85014 HEMATOCRIT: CPT

## 2020-01-01 PROCEDURE — 12000002 HC ACUTE/MED SURGE SEMI-PRIVATE ROOM

## 2020-01-01 PROCEDURE — 87206 SMEAR FLUORESCENT/ACID STAI: CPT

## 2020-01-01 PROCEDURE — 90935 PR HEMODIALYSIS, ONE EVALUATION: ICD-10-PCS | Mod: ,,, | Performed by: INTERNAL MEDICINE

## 2020-01-01 PROCEDURE — 99239 PR HOSPITAL DISCHARGE DAY,>30 MIN: ICD-10-PCS | Mod: ,,, | Performed by: INTERNAL MEDICINE

## 2020-01-01 PROCEDURE — 31500 PR INSERT, EMERGENCY ENDOTRACH AIRWAY: ICD-10-PCS | Mod: GC,,, | Performed by: EMERGENCY MEDICINE

## 2020-01-01 PROCEDURE — 99291 PR CRITICAL CARE, E/M 30-74 MINUTES: ICD-10-PCS | Mod: ,,, | Performed by: PHYSICIAN ASSISTANT

## 2020-01-01 PROCEDURE — 87116 MYCOBACTERIA CULTURE: CPT

## 2020-01-01 PROCEDURE — 92610 EVALUATE SWALLOWING FUNCTION: CPT

## 2020-01-01 PROCEDURE — 99283 PR EMERGENCY DEPT VISIT,LEVEL III: ICD-10-PCS | Mod: ,,, | Performed by: EMERGENCY MEDICINE

## 2020-01-01 PROCEDURE — 97110 THERAPEUTIC EXERCISES: CPT

## 2020-01-01 PROCEDURE — 87632 RESP VIRUS 6-11 TARGETS: CPT

## 2020-01-01 PROCEDURE — 87529 HSV DNA AMP PROBE: CPT

## 2020-01-01 PROCEDURE — 89051 BODY FLUID CELL COUNT: CPT | Mod: 91

## 2020-01-01 PROCEDURE — 99232 SBSQ HOSP IP/OBS MODERATE 35: CPT | Mod: GC,,, | Performed by: INTERNAL MEDICINE

## 2020-01-01 PROCEDURE — 99226 PR SUBSEQUENT OBSERVATION CARE,LEVEL III: CPT | Mod: ,,, | Performed by: PHYSICIAN ASSISTANT

## 2020-01-01 PROCEDURE — 85652 RBC SED RATE AUTOMATED: CPT

## 2020-01-01 PROCEDURE — 36907 BALO ANGIOP CTR DIALYSIS SEG: CPT | Mod: GC,,, | Performed by: SURGERY

## 2020-01-01 PROCEDURE — 99497 ADVNCD CARE PLAN 30 MIN: CPT | Mod: ,,, | Performed by: CLINICAL NURSE SPECIALIST

## 2020-01-01 PROCEDURE — 99214 PR OFFICE/OUTPT VISIT, EST, LEVL IV, 30-39 MIN: ICD-10-PCS | Mod: ,,, | Performed by: INTERNAL MEDICINE

## 2020-01-01 PROCEDURE — 96365 THER/PROPH/DIAG IV INF INIT: CPT | Mod: 59

## 2020-01-01 PROCEDURE — 99152 MOD SED SAME PHYS/QHP 5/>YRS: CPT | Mod: ,,, | Performed by: SURGERY

## 2020-01-01 PROCEDURE — 82945 GLUCOSE OTHER FLUID: CPT

## 2020-01-01 PROCEDURE — 87102 FUNGUS ISOLATION CULTURE: CPT

## 2020-01-01 PROCEDURE — 27201423 OPTIME MED/SURG SUP & DEVICES STERILE SUPPLY: Performed by: SURGERY

## 2020-01-01 PROCEDURE — 36907 PR TRANSLML BALLOON ANGIO, CTR DIALYSIS SEGMENT THRU DIALYSIS CIRCUIT: ICD-10-PCS | Mod: GC,,, | Performed by: SURGERY

## 2020-01-01 PROCEDURE — 99238 PR HOSPITAL DISCHARGE DAY,<30 MIN: ICD-10-PCS | Mod: ,,, | Performed by: INTERNAL MEDICINE

## 2020-01-01 PROCEDURE — 99226 PR SUBSEQUENT OBSERVATION CARE,LEVEL III: CPT | Mod: 95,,, | Performed by: INTERNAL MEDICINE

## 2020-01-01 PROCEDURE — 99285 EMERGENCY DEPT VISIT HI MDM: CPT | Mod: ,,, | Performed by: EMERGENCY MEDICINE

## 2020-01-01 PROCEDURE — 84425 ASSAY OF VITAMIN B-1: CPT

## 2020-01-01 PROCEDURE — 86592 SYPHILIS TEST NON-TREP QUAL: CPT

## 2020-01-01 PROCEDURE — 99497 PR ADVNCD CARE PLAN 30 MIN: ICD-10-PCS | Mod: ,,, | Performed by: CLINICAL NURSE SPECIALIST

## 2020-01-01 PROCEDURE — 99239 HOSP IP/OBS DSCHRG MGMT >30: CPT | Mod: ,,, | Performed by: INTERNAL MEDICINE

## 2020-01-01 PROCEDURE — 99153 MOD SED SAME PHYS/QHP EA: CPT | Performed by: SURGERY

## 2020-01-01 PROCEDURE — 37000008 HC ANESTHESIA 1ST 15 MINUTES: Performed by: INTERNAL MEDICINE

## 2020-01-01 PROCEDURE — 80307 DRUG TEST PRSMV CHEM ANLYZR: CPT

## 2020-01-01 PROCEDURE — 99223 1ST HOSP IP/OBS HIGH 75: CPT | Mod: ,,, | Performed by: SURGERY

## 2020-01-01 PROCEDURE — 43752 NASAL/OROGASTRIC W/TUBE PLMT: CPT

## 2020-01-01 PROCEDURE — 99226 PR SUBSEQUENT OBSERVATION CARE,LEVEL III: ICD-10-PCS | Mod: 95,,, | Performed by: INTERNAL MEDICINE

## 2020-01-01 PROCEDURE — 99152 PR MOD CONSCIOUS SEDATION, SAME PHYS, 5+ YRS, FIRST 15 MIN: ICD-10-PCS | Mod: ,,, | Performed by: SURGERY

## 2020-01-01 PROCEDURE — 87798 DETECT AGENT NOS DNA AMP: CPT | Mod: 91

## 2020-01-01 PROCEDURE — 95711 VEEG 2-12 HR UNMONITORED: CPT

## 2020-01-01 PROCEDURE — 27201012 HC FORCEPS, HOT/COLD, DISP: Performed by: INTERNAL MEDICINE

## 2020-01-01 PROCEDURE — 95822 EEG COMA OR SLEEP ONLY: CPT | Mod: 26,,, | Performed by: PSYCHIATRY & NEUROLOGY

## 2020-01-01 PROCEDURE — D9220A PRA ANESTHESIA: ICD-10-PCS | Mod: ANES,,, | Performed by: ANESTHESIOLOGY

## 2020-01-01 RX ORDER — POLYETHYLENE GLYCOL 3350 17 G/17G
17 POWDER, FOR SOLUTION ORAL DAILY
Status: DISCONTINUED | OUTPATIENT
Start: 2020-01-01 | End: 2020-01-01

## 2020-01-01 RX ORDER — FAMOTIDINE 40 MG/5ML
20 POWDER, FOR SUSPENSION ORAL DAILY
Status: DISCONTINUED | OUTPATIENT
Start: 2020-01-01 | End: 2020-01-01

## 2020-01-01 RX ORDER — AMOXICILLIN 250 MG
1 CAPSULE ORAL 2 TIMES DAILY
Start: 2020-01-01

## 2020-01-01 RX ORDER — POTASSIUM CHLORIDE 20 MEQ/15ML
40 SOLUTION ORAL
Status: DISCONTINUED | OUTPATIENT
Start: 2020-01-01 | End: 2020-01-01

## 2020-01-01 RX ORDER — SODIUM CHLORIDE 0.9 % (FLUSH) 0.9 %
10 SYRINGE (ML) INJECTION
Status: CANCELLED | OUTPATIENT
Start: 2020-01-01

## 2020-01-01 RX ORDER — SODIUM CHLORIDE 0.9 % (FLUSH) 0.9 %
10 SYRINGE (ML) INJECTION
Status: DISCONTINUED | OUTPATIENT
Start: 2020-01-01 | End: 2020-01-01 | Stop reason: HOSPADM

## 2020-01-01 RX ORDER — SODIUM CHLORIDE 9 MG/ML
INJECTION, SOLUTION INTRAVENOUS ONCE
Status: COMPLETED | OUTPATIENT
Start: 2020-01-01 | End: 2020-01-01

## 2020-01-01 RX ORDER — SENNOSIDES 8.6 MG/1
2 TABLET ORAL DAILY
Status: DISCONTINUED | OUTPATIENT
Start: 2020-01-01 | End: 2020-01-01

## 2020-01-01 RX ORDER — GLUCAGON 1 MG
1 KIT INJECTION
Status: DISCONTINUED | OUTPATIENT
Start: 2020-01-01 | End: 2020-01-01

## 2020-01-01 RX ORDER — AMOXICILLIN 250 MG
1 CAPSULE ORAL 2 TIMES DAILY
Status: DISCONTINUED | OUTPATIENT
Start: 2020-01-01 | End: 2020-08-12 | Stop reason: HOSPADM

## 2020-01-01 RX ORDER — ONDANSETRON 2 MG/ML
4 INJECTION INTRAMUSCULAR; INTRAVENOUS EVERY 8 HOURS PRN
Status: DISCONTINUED | OUTPATIENT
Start: 2020-01-01 | End: 2020-01-01 | Stop reason: HOSPADM

## 2020-01-01 RX ORDER — SODIUM CHLORIDE 9 MG/ML
INJECTION, SOLUTION INTRAVENOUS
Status: DISCONTINUED | OUTPATIENT
Start: 2020-01-01 | End: 2020-01-01

## 2020-01-01 RX ORDER — CEFTRIAXONE 1 G/1
1 INJECTION, POWDER, FOR SOLUTION INTRAMUSCULAR; INTRAVENOUS
Status: DISCONTINUED | OUTPATIENT
Start: 2020-01-01 | End: 2020-01-01

## 2020-01-01 RX ORDER — TALC
6 POWDER (GRAM) TOPICAL NIGHTLY PRN
Status: DISCONTINUED | OUTPATIENT
Start: 2020-01-01 | End: 2020-01-01 | Stop reason: HOSPADM

## 2020-01-01 RX ORDER — ACETAMINOPHEN 10 MG/ML
1000 INJECTION, SOLUTION INTRAVENOUS ONCE
Status: DISCONTINUED | OUTPATIENT
Start: 2020-01-01 | End: 2020-01-01

## 2020-01-01 RX ORDER — PROPOFOL 10 MG/ML
5 INJECTION, EMULSION INTRAVENOUS CONTINUOUS
Status: DISCONTINUED | OUTPATIENT
Start: 2020-01-01 | End: 2020-01-01

## 2020-01-01 RX ORDER — BISACODYL 10 MG
10 SUPPOSITORY, RECTAL RECTAL ONCE
Status: DISCONTINUED | OUTPATIENT
Start: 2020-01-01 | End: 2020-01-01 | Stop reason: HOSPADM

## 2020-01-01 RX ORDER — SODIUM CHLORIDE 9 MG/ML
INJECTION, SOLUTION INTRAVENOUS
Status: DISCONTINUED | OUTPATIENT
Start: 2020-01-01 | End: 2020-01-01 | Stop reason: HOSPADM

## 2020-01-01 RX ORDER — FENTANYL CITRATE 50 UG/ML
INJECTION, SOLUTION INTRAMUSCULAR; INTRAVENOUS
Status: DISCONTINUED | OUTPATIENT
Start: 2020-01-01 | End: 2020-01-01 | Stop reason: HOSPADM

## 2020-01-01 RX ORDER — SEVELAMER CARBONATE 800 MG/1
800 TABLET, FILM COATED ORAL
Status: DISCONTINUED | OUTPATIENT
Start: 2020-01-01 | End: 2020-01-01

## 2020-01-01 RX ORDER — SODIUM CHLORIDE 9 MG/ML
INJECTION, SOLUTION INTRAVENOUS
Status: CANCELLED | OUTPATIENT
Start: 2020-01-01

## 2020-01-01 RX ORDER — SODIUM CHLORIDE 9 MG/ML
INJECTION, SOLUTION INTRAVENOUS ONCE
Status: DISCONTINUED | OUTPATIENT
Start: 2020-01-01 | End: 2020-01-01

## 2020-01-01 RX ORDER — PANTOPRAZOLE SODIUM 40 MG/1
40 FOR SUSPENSION ORAL 2 TIMES DAILY
Status: DISCONTINUED | OUTPATIENT
Start: 2020-01-01 | End: 2020-01-01

## 2020-01-01 RX ORDER — ONDANSETRON 2 MG/ML
4 INJECTION INTRAMUSCULAR; INTRAVENOUS
Status: COMPLETED | OUTPATIENT
Start: 2020-01-01 | End: 2020-01-01

## 2020-01-01 RX ORDER — ACETAMINOPHEN 325 MG/1
650 TABLET ORAL EVERY 4 HOURS PRN
Status: DISCONTINUED | OUTPATIENT
Start: 2020-01-01 | End: 2020-01-01 | Stop reason: HOSPADM

## 2020-01-01 RX ORDER — LEVETIRACETAM 15 MG/ML
1500 INJECTION INTRAVASCULAR ONCE
Status: COMPLETED | OUTPATIENT
Start: 2020-01-01 | End: 2020-01-01

## 2020-01-01 RX ORDER — ACETAMINOPHEN 325 MG/1
650 TABLET ORAL EVERY 6 HOURS PRN
Status: DISCONTINUED | OUTPATIENT
Start: 2020-01-01 | End: 2020-01-01 | Stop reason: HOSPADM

## 2020-01-01 RX ORDER — ACETAMINOPHEN 325 MG/1
650 TABLET ORAL EVERY 6 HOURS PRN
Status: DISCONTINUED | OUTPATIENT
Start: 2020-01-01 | End: 2020-01-01

## 2020-01-01 RX ORDER — PROCHLORPERAZINE EDISYLATE 5 MG/ML
5 INJECTION INTRAMUSCULAR; INTRAVENOUS EVERY 6 HOURS PRN
Status: DISCONTINUED | OUTPATIENT
Start: 2020-01-01 | End: 2020-01-01

## 2020-01-01 RX ORDER — HYDRALAZINE HYDROCHLORIDE 50 MG/1
25 TABLET, FILM COATED ORAL EVERY 8 HOURS PRN
Qty: 45 TABLET | Refills: 11 | Status: ON HOLD | OUTPATIENT
Start: 2020-01-01 | End: 2020-01-01 | Stop reason: HOSPADM

## 2020-01-01 RX ORDER — PANTOPRAZOLE SODIUM 40 MG/10ML
40 INJECTION, POWDER, LYOPHILIZED, FOR SOLUTION INTRAVENOUS
Status: COMPLETED | OUTPATIENT
Start: 2020-01-01 | End: 2020-01-01

## 2020-01-01 RX ORDER — DOXYCYCLINE HYCLATE 100 MG
100 TABLET ORAL EVERY 12 HOURS
Status: DISCONTINUED | OUTPATIENT
Start: 2020-01-01 | End: 2020-01-01

## 2020-01-01 RX ORDER — LEVETIRACETAM 100 MG/ML
250 SOLUTION ORAL 2 TIMES DAILY
Status: DISCONTINUED | OUTPATIENT
Start: 2020-01-01 | End: 2020-01-01 | Stop reason: HOSPADM

## 2020-01-01 RX ORDER — AMOXICILLIN AND CLAVULANATE POTASSIUM 500; 125 MG/1; MG/1
1 TABLET, FILM COATED ORAL DAILY
Status: COMPLETED | OUTPATIENT
Start: 2020-01-01 | End: 2020-01-01

## 2020-01-01 RX ORDER — AMOXICILLIN 250 MG
1 CAPSULE ORAL 2 TIMES DAILY
Status: DISCONTINUED | OUTPATIENT
Start: 2020-01-01 | End: 2020-01-01 | Stop reason: HOSPADM

## 2020-01-01 RX ORDER — ACETAMINOPHEN AND CODEINE PHOSPHATE 120; 12 MG/5ML; MG/5ML
2.5 SOLUTION ORAL ONCE AS NEEDED
Status: COMPLETED | OUTPATIENT
Start: 2020-01-01 | End: 2020-01-01

## 2020-01-01 RX ORDER — SEVELAMER CARBONATE FOR ORAL SUSPENSION 800 MG/1
0.8 POWDER, FOR SUSPENSION ORAL
Status: DISCONTINUED | OUTPATIENT
Start: 2020-01-01 | End: 2020-01-01

## 2020-01-01 RX ORDER — LEVETIRACETAM 100 MG/ML
250 SOLUTION ORAL 2 TIMES DAILY
Qty: 150 ML | Refills: 11 | Status: SHIPPED | OUTPATIENT
Start: 2020-01-01 | End: 2020-01-01 | Stop reason: SDUPTHER

## 2020-01-01 RX ORDER — LEVETIRACETAM 100 MG/ML
500 SOLUTION ORAL 2 TIMES DAILY
Status: DISCONTINUED | OUTPATIENT
Start: 2020-01-01 | End: 2020-01-01

## 2020-01-01 RX ORDER — SODIUM CHLORIDE 0.9 % (FLUSH) 0.9 %
10 SYRINGE (ML) INJECTION EVERY 8 HOURS PRN
Status: DISCONTINUED | OUTPATIENT
Start: 2020-01-01 | End: 2020-01-01 | Stop reason: HOSPADM

## 2020-01-01 RX ORDER — ROCURONIUM BROMIDE 10 MG/ML
0.6 INJECTION, SOLUTION INTRAVENOUS
Status: DISCONTINUED | OUTPATIENT
Start: 2020-01-01 | End: 2020-01-01

## 2020-01-01 RX ORDER — CARVEDILOL 3.12 MG/1
3.12 TABLET ORAL 2 TIMES DAILY
Status: DISCONTINUED | OUTPATIENT
Start: 2020-01-01 | End: 2020-01-01

## 2020-01-01 RX ORDER — LEVETIRACETAM 100 MG/ML
250 SOLUTION ORAL 2 TIMES DAILY
Qty: 150 ML | Refills: 11 | Status: ON HOLD | OUTPATIENT
Start: 2020-01-01 | End: 2020-01-01 | Stop reason: HOSPADM

## 2020-01-01 RX ORDER — PANTOPRAZOLE SODIUM 40 MG/1
40 TABLET, DELAYED RELEASE ORAL
Status: DISCONTINUED | OUTPATIENT
Start: 2020-01-01 | End: 2020-01-01 | Stop reason: HOSPADM

## 2020-01-01 RX ORDER — SEVELAMER CARBONATE 800 MG/1
800 TABLET, FILM COATED ORAL 2 TIMES DAILY WITH MEALS
Status: DISCONTINUED | OUTPATIENT
Start: 2020-01-01 | End: 2020-01-01

## 2020-01-01 RX ORDER — HYDRALAZINE HYDROCHLORIDE 25 MG/1
25 TABLET, FILM COATED ORAL EVERY 8 HOURS PRN
Status: DISCONTINUED | OUTPATIENT
Start: 2020-01-01 | End: 2020-01-01 | Stop reason: HOSPADM

## 2020-01-01 RX ORDER — ALBUTEROL SULFATE 90 UG/1
2 AEROSOL, METERED RESPIRATORY (INHALATION) EVERY 6 HOURS
Status: DISCONTINUED | OUTPATIENT
Start: 2020-01-01 | End: 2020-01-01

## 2020-01-01 RX ORDER — LEVETIRACETAM 5 MG/ML
500 INJECTION INTRAVASCULAR EVERY 12 HOURS
Status: DISCONTINUED | OUTPATIENT
Start: 2020-01-01 | End: 2020-01-01

## 2020-01-01 RX ORDER — ONDANSETRON 4 MG/1
8 TABLET, FILM COATED ORAL EVERY 8 HOURS
Status: DISCONTINUED | OUTPATIENT
Start: 2020-01-01 | End: 2020-01-01

## 2020-01-01 RX ORDER — PROCHLORPERAZINE EDISYLATE 5 MG/ML
5 INJECTION INTRAMUSCULAR; INTRAVENOUS ONCE
Status: COMPLETED | OUTPATIENT
Start: 2020-01-01 | End: 2020-01-01

## 2020-01-01 RX ORDER — IBUPROFEN 200 MG
16 TABLET ORAL
Status: DISCONTINUED | OUTPATIENT
Start: 2020-01-01 | End: 2020-01-01 | Stop reason: HOSPADM

## 2020-01-01 RX ORDER — MIDODRINE HYDROCHLORIDE 5 MG/1
5 TABLET ORAL DAILY
Status: DISCONTINUED | OUTPATIENT
Start: 2020-01-01 | End: 2020-01-01

## 2020-01-01 RX ORDER — PSEUDOEPHEDRINE/ACETAMINOPHEN 30MG-500MG
100 TABLET ORAL
Status: COMPLETED | OUTPATIENT
Start: 2020-01-01 | End: 2020-01-01

## 2020-01-01 RX ORDER — MIDODRINE HYDROCHLORIDE 5 MG/1
5 TABLET ORAL ONCE
Status: COMPLETED | OUTPATIENT
Start: 2020-01-01 | End: 2020-01-01

## 2020-01-01 RX ORDER — PANTOPRAZOLE SODIUM 40 MG/1
40 FOR SUSPENSION ORAL 2 TIMES DAILY
Status: DISCONTINUED | OUTPATIENT
Start: 2020-01-01 | End: 2020-01-01 | Stop reason: HOSPADM

## 2020-01-01 RX ORDER — IBUPROFEN 200 MG
24 TABLET ORAL
Status: DISCONTINUED | OUTPATIENT
Start: 2020-01-01 | End: 2020-08-12 | Stop reason: HOSPADM

## 2020-01-01 RX ORDER — NOREPINEPHRINE BITARTRATE/D5W 4MG/250ML
PLASTIC BAG, INJECTION (ML) INTRAVENOUS
Status: DISCONTINUED
Start: 2020-01-01 | End: 2020-01-01 | Stop reason: WASHOUT

## 2020-01-01 RX ORDER — SODIUM CHLORIDE 9 MG/ML
INJECTION, SOLUTION INTRAVENOUS CONTINUOUS
Status: DISCONTINUED | OUTPATIENT
Start: 2020-01-01 | End: 2020-01-01

## 2020-01-01 RX ORDER — ALBUTEROL SULFATE 90 UG/1
2 AEROSOL, METERED RESPIRATORY (INHALATION)
Status: DISCONTINUED | OUTPATIENT
Start: 2020-01-01 | End: 2020-01-01

## 2020-01-01 RX ORDER — SODIUM,POTASSIUM PHOSPHATES 280-250MG
2 POWDER IN PACKET (EA) ORAL
Status: DISCONTINUED | OUTPATIENT
Start: 2020-01-01 | End: 2020-01-01

## 2020-01-01 RX ORDER — VANCOMYCIN HCL IN 5 % DEXTROSE 1G/250ML
1000 PLASTIC BAG, INJECTION (ML) INTRAVENOUS ONCE
Status: DISCONTINUED | OUTPATIENT
Start: 2020-01-01 | End: 2020-01-01

## 2020-01-01 RX ORDER — MIDODRINE HYDROCHLORIDE 5 MG/1
5 TABLET ORAL
Status: DISCONTINUED | OUTPATIENT
Start: 2020-01-01 | End: 2020-01-01

## 2020-01-01 RX ORDER — GUAIFENESIN 100 MG/5ML
200 SOLUTION ORAL EVERY 4 HOURS
Status: DISCONTINUED | OUTPATIENT
Start: 2020-01-01 | End: 2020-08-12 | Stop reason: HOSPADM

## 2020-01-01 RX ORDER — PROPOFOL 10 MG/ML
INJECTION, EMULSION INTRAVENOUS
Status: DISPENSED
Start: 2020-01-01 | End: 2020-01-01

## 2020-01-01 RX ORDER — MORPHINE SULFATE 2 MG/ML
2 INJECTION, SOLUTION INTRAMUSCULAR; INTRAVENOUS ONCE
Status: COMPLETED | OUTPATIENT
Start: 2020-08-12 | End: 2020-01-01

## 2020-01-01 RX ORDER — MUPIROCIN 20 MG/G
OINTMENT TOPICAL 2 TIMES DAILY
Status: DISCONTINUED | OUTPATIENT
Start: 2020-01-01 | End: 2020-01-01 | Stop reason: HOSPADM

## 2020-01-01 RX ORDER — MORPHINE SULFATE 2 MG/ML
INJECTION, SOLUTION INTRAMUSCULAR; INTRAVENOUS
Status: DISCONTINUED
Start: 2020-01-01 | End: 2020-08-12 | Stop reason: HOSPADM

## 2020-01-01 RX ORDER — ONDANSETRON 8 MG/1
8 TABLET, ORALLY DISINTEGRATING ORAL EVERY 8 HOURS PRN
Status: DISCONTINUED | OUTPATIENT
Start: 2020-01-01 | End: 2020-01-01 | Stop reason: HOSPADM

## 2020-01-01 RX ORDER — NOREPINEPHRINE BITARTRATE/D5W 4MG/250ML
0.02 PLASTIC BAG, INJECTION (ML) INTRAVENOUS CONTINUOUS
Status: DISCONTINUED | OUTPATIENT
Start: 2020-01-01 | End: 2020-01-01

## 2020-01-01 RX ORDER — ONDANSETRON 2 MG/ML
INJECTION INTRAMUSCULAR; INTRAVENOUS
Status: DISCONTINUED | OUTPATIENT
Start: 2020-01-01 | End: 2020-01-01

## 2020-01-01 RX ORDER — PANTOPRAZOLE SODIUM 40 MG/10ML
40 INJECTION, POWDER, LYOPHILIZED, FOR SOLUTION INTRAVENOUS EVERY 12 HOURS
Status: DISCONTINUED | OUTPATIENT
Start: 2020-01-01 | End: 2020-01-01

## 2020-01-01 RX ORDER — HEPARIN SODIUM 5000 [USP'U]/ML
5000 INJECTION, SOLUTION INTRAVENOUS; SUBCUTANEOUS EVERY 12 HOURS
Status: DISCONTINUED | OUTPATIENT
Start: 2020-01-01 | End: 2020-01-01

## 2020-01-01 RX ORDER — LORAZEPAM 2 MG/ML
INJECTION INTRAMUSCULAR
Status: DISPENSED
Start: 2020-01-01 | End: 2020-01-01

## 2020-01-01 RX ORDER — GLUCAGON 1 MG
1 KIT INJECTION
Status: DISCONTINUED | OUTPATIENT
Start: 2020-01-01 | End: 2020-08-12 | Stop reason: HOSPADM

## 2020-01-01 RX ORDER — IPRATROPIUM BROMIDE AND ALBUTEROL SULFATE 2.5; .5 MG/3ML; MG/3ML
3 SOLUTION RESPIRATORY (INHALATION) EVERY 4 HOURS PRN
Status: DISCONTINUED | OUTPATIENT
Start: 2020-01-01 | End: 2020-08-12 | Stop reason: HOSPADM

## 2020-01-01 RX ORDER — GUAIFENESIN/DEXTROMETHORPHAN 100-10MG/5
10 SYRUP ORAL EVERY 4 HOURS PRN
Refills: 0 | COMMUNITY
Start: 2020-01-01 | End: 2020-01-01

## 2020-01-01 RX ORDER — PANTOPRAZOLE SODIUM 40 MG/1
40 TABLET, DELAYED RELEASE ORAL DAILY
Status: DISCONTINUED | OUTPATIENT
Start: 2020-01-01 | End: 2020-01-01 | Stop reason: HOSPADM

## 2020-01-01 RX ORDER — METOCLOPRAMIDE 5 MG/1
5 TABLET ORAL 4 TIMES DAILY
Start: 2020-01-01

## 2020-01-01 RX ORDER — METOCLOPRAMIDE 5 MG/1
5 TABLET ORAL 4 TIMES DAILY
Status: DISCONTINUED | OUTPATIENT
Start: 2020-01-01 | End: 2020-01-01 | Stop reason: HOSPADM

## 2020-01-01 RX ORDER — HYDROCODONE BITARTRATE AND ACETAMINOPHEN 5; 325 MG/1; MG/1
1 TABLET ORAL EVERY 6 HOURS PRN
Qty: 10 TABLET | Refills: 0 | Status: SHIPPED | OUTPATIENT
Start: 2020-01-01

## 2020-01-01 RX ORDER — PANTOPRAZOLE SODIUM 40 MG/10ML
80 INJECTION, POWDER, LYOPHILIZED, FOR SOLUTION INTRAVENOUS
Status: COMPLETED | OUTPATIENT
Start: 2020-01-01 | End: 2020-01-01

## 2020-01-01 RX ORDER — HEPARIN SODIUM 5000 [USP'U]/ML
7500 INJECTION, SOLUTION INTRAVENOUS; SUBCUTANEOUS EVERY 12 HOURS
Status: DISCONTINUED | OUTPATIENT
Start: 2020-01-01 | End: 2020-01-01

## 2020-01-01 RX ORDER — SODIUM,POTASSIUM PHOSPHATES 280-250MG
1 POWDER IN PACKET (EA) ORAL ONCE
Status: DISCONTINUED | OUTPATIENT
Start: 2020-01-01 | End: 2020-01-01

## 2020-01-01 RX ORDER — TALC
6 POWDER (GRAM) TOPICAL NIGHTLY PRN
Status: DISCONTINUED | OUTPATIENT
Start: 2020-01-01 | End: 2020-01-01

## 2020-01-01 RX ORDER — NOREPINEPHRINE BITARTRATE/D5W 4MG/250ML
PLASTIC BAG, INJECTION (ML) INTRAVENOUS
Status: COMPLETED
Start: 2020-01-01 | End: 2020-01-01

## 2020-01-01 RX ORDER — LORAZEPAM 2 MG/ML
2 INJECTION INTRAMUSCULAR
Status: COMPLETED | OUTPATIENT
Start: 2020-01-01 | End: 2020-01-01

## 2020-01-01 RX ORDER — NICARDIPINE HYDROCHLORIDE 0.2 MG/ML
2.5 INJECTION INTRAVENOUS CONTINUOUS
Status: DISCONTINUED | OUTPATIENT
Start: 2020-01-01 | End: 2020-01-01

## 2020-01-01 RX ORDER — DOXYCYCLINE HYCLATE 100 MG
100 TABLET ORAL EVERY 12 HOURS
Status: COMPLETED | OUTPATIENT
Start: 2020-01-01 | End: 2020-01-01

## 2020-01-01 RX ORDER — ALBUTEROL SULFATE 90 UG/1
2 AEROSOL, METERED RESPIRATORY (INHALATION) EVERY 6 HOURS
Status: DISCONTINUED | OUTPATIENT
Start: 2020-01-01 | End: 2020-01-01 | Stop reason: HOSPADM

## 2020-01-01 RX ORDER — IBUPROFEN 200 MG
16 TABLET ORAL
Status: DISCONTINUED | OUTPATIENT
Start: 2020-01-01 | End: 2020-01-01

## 2020-01-01 RX ORDER — LOPERAMIDE HYDROCHLORIDE 2 MG/1
2 CAPSULE ORAL EVERY 6 HOURS PRN
Status: DISCONTINUED | OUTPATIENT
Start: 2020-01-01 | End: 2020-01-01

## 2020-01-01 RX ORDER — ETOMIDATE 2 MG/ML
20 INJECTION INTRAVENOUS
Status: COMPLETED | OUTPATIENT
Start: 2020-01-01 | End: 2020-01-01

## 2020-01-01 RX ORDER — INSULIN ASPART 100 [IU]/ML
0-5 INJECTION, SOLUTION INTRAVENOUS; SUBCUTANEOUS
Refills: 0
Start: 2020-01-01 | End: 2021-05-04

## 2020-01-01 RX ORDER — PHENYLEPHRINE HYDROCHLORIDE 10 MG/ML
INJECTION INTRAVENOUS
Status: DISCONTINUED | OUTPATIENT
Start: 2020-01-01 | End: 2020-01-01

## 2020-01-01 RX ORDER — IBUPROFEN 200 MG
24 TABLET ORAL
Status: DISCONTINUED | OUTPATIENT
Start: 2020-01-01 | End: 2020-01-01

## 2020-01-01 RX ORDER — METOCLOPRAMIDE 10 MG/1
10 TABLET ORAL
Status: DISCONTINUED | OUTPATIENT
Start: 2020-01-01 | End: 2020-01-01

## 2020-01-01 RX ORDER — LANOLIN ALCOHOL/MO/W.PET/CERES
800 CREAM (GRAM) TOPICAL
Status: DISCONTINUED | OUTPATIENT
Start: 2020-01-01 | End: 2020-01-01

## 2020-01-01 RX ORDER — MIDODRINE HYDROCHLORIDE 5 MG/1
5 TABLET ORAL
Status: DISCONTINUED | OUTPATIENT
Start: 2020-01-01 | End: 2020-01-01 | Stop reason: HOSPADM

## 2020-01-01 RX ORDER — SODIUM CHLORIDE 0.9 % (FLUSH) 0.9 %
10 SYRINGE (ML) INJECTION
Status: DISCONTINUED | OUTPATIENT
Start: 2020-01-01 | End: 2020-01-01

## 2020-01-01 RX ORDER — ACETAMINOPHEN 650 MG/1
650 SUPPOSITORY RECTAL EVERY 4 HOURS PRN
Status: DISPENSED | OUTPATIENT
Start: 2020-01-01 | End: 2020-01-01

## 2020-01-01 RX ORDER — PANTOPRAZOLE SODIUM 40 MG/1
40 TABLET, DELAYED RELEASE ORAL 2 TIMES DAILY
Status: DISCONTINUED | OUTPATIENT
Start: 2020-01-01 | End: 2020-01-01

## 2020-01-01 RX ORDER — PANTOPRAZOLE SODIUM 40 MG/10ML
40 INJECTION, POWDER, LYOPHILIZED, FOR SOLUTION INTRAVENOUS 2 TIMES DAILY
Status: DISCONTINUED | OUTPATIENT
Start: 2020-01-01 | End: 2020-01-01

## 2020-01-01 RX ORDER — LIDOCAINE HYDROCHLORIDE 20 MG/ML
INJECTION INTRAVENOUS
Status: DISCONTINUED | OUTPATIENT
Start: 2020-01-01 | End: 2020-01-01

## 2020-01-01 RX ORDER — LEVETIRACETAM 100 MG/ML
250 SOLUTION ORAL 2 TIMES DAILY
Status: DISCONTINUED | OUTPATIENT
Start: 2020-01-01 | End: 2020-01-01

## 2020-01-01 RX ORDER — ACETAMINOPHEN 650 MG/1
650 SUPPOSITORY RECTAL
Status: COMPLETED | OUTPATIENT
Start: 2020-01-01 | End: 2020-01-01

## 2020-01-01 RX ORDER — CHLORHEXIDINE GLUCONATE ORAL RINSE 1.2 MG/ML
15 SOLUTION DENTAL 2 TIMES DAILY
Status: DISCONTINUED | OUTPATIENT
Start: 2020-01-01 | End: 2020-01-01

## 2020-01-01 RX ORDER — TRIPROLIDINE/PSEUDOEPHEDRINE 2.5MG-60MG
400 TABLET ORAL EVERY 6 HOURS PRN
Status: DISCONTINUED | OUTPATIENT
Start: 2020-01-01 | End: 2020-01-01

## 2020-01-01 RX ORDER — IPRATROPIUM BROMIDE AND ALBUTEROL SULFATE 2.5; .5 MG/3ML; MG/3ML
3 SOLUTION RESPIRATORY (INHALATION) EVERY 6 HOURS PRN
Status: DISCONTINUED | OUTPATIENT
Start: 2020-01-01 | End: 2020-01-01 | Stop reason: HOSPADM

## 2020-01-01 RX ORDER — MIDAZOLAM HYDROCHLORIDE 1 MG/ML
INJECTION, SOLUTION INTRAMUSCULAR; INTRAVENOUS
Status: DISCONTINUED | OUTPATIENT
Start: 2020-01-01 | End: 2020-01-01 | Stop reason: HOSPADM

## 2020-01-01 RX ORDER — ONDANSETRON 2 MG/ML
INJECTION INTRAMUSCULAR; INTRAVENOUS
Status: DISPENSED
Start: 2020-01-01 | End: 2020-01-01

## 2020-01-01 RX ORDER — VANCOMYCIN HCL IN 5 % DEXTROSE 1G/250ML
20 PLASTIC BAG, INJECTION (ML) INTRAVENOUS
Status: ACTIVE | OUTPATIENT
Start: 2020-01-01 | End: 2020-01-01

## 2020-01-01 RX ORDER — PANTOPRAZOLE SODIUM 40 MG/1
40 TABLET, DELAYED RELEASE ORAL DAILY
Status: DISCONTINUED | OUTPATIENT
Start: 2020-01-01 | End: 2020-01-01

## 2020-01-01 RX ORDER — FENTANYL CITRATE 50 UG/ML
INJECTION, SOLUTION INTRAMUSCULAR; INTRAVENOUS CODE/TRAUMA/SEDATION MEDICATION
Status: COMPLETED | OUTPATIENT
Start: 2020-01-01 | End: 2020-01-01

## 2020-01-01 RX ORDER — MIDAZOLAM HYDROCHLORIDE 1 MG/ML
1 INJECTION INTRAMUSCULAR; INTRAVENOUS
Status: DISCONTINUED | OUTPATIENT
Start: 2020-01-01 | End: 2020-01-01

## 2020-01-01 RX ORDER — HYDROCODONE BITARTRATE AND ACETAMINOPHEN 500; 5 MG/1; MG/1
TABLET ORAL
Status: DISCONTINUED | OUTPATIENT
Start: 2020-01-01 | End: 2020-01-01 | Stop reason: HOSPADM

## 2020-01-01 RX ORDER — ONDANSETRON 2 MG/ML
4 INJECTION INTRAMUSCULAR; INTRAVENOUS EVERY 8 HOURS PRN
Status: DISCONTINUED | OUTPATIENT
Start: 2020-01-01 | End: 2020-01-01

## 2020-01-01 RX ORDER — HEPARIN SODIUM 1000 [USP'U]/ML
INJECTION, SOLUTION INTRAVENOUS; SUBCUTANEOUS
Status: DISCONTINUED | OUTPATIENT
Start: 2020-01-01 | End: 2020-01-01 | Stop reason: HOSPADM

## 2020-01-01 RX ORDER — BISACODYL 10 MG
10 SUPPOSITORY, RECTAL RECTAL DAILY PRN
Refills: 0
Start: 2020-01-01

## 2020-01-01 RX ORDER — HYDROCODONE BITARTRATE AND ACETAMINOPHEN 500; 5 MG/1; MG/1
TABLET ORAL
Status: DISCONTINUED | OUTPATIENT
Start: 2020-01-01 | End: 2020-01-01

## 2020-01-01 RX ORDER — LEVETIRACETAM 10 MG/ML
1000 INJECTION INTRAVASCULAR EVERY 12 HOURS
Status: DISCONTINUED | OUTPATIENT
Start: 2020-01-01 | End: 2020-01-01

## 2020-01-01 RX ORDER — LEVETIRACETAM 15 MG/ML
1500 INJECTION INTRAVASCULAR
Status: COMPLETED | OUTPATIENT
Start: 2020-01-01 | End: 2020-01-01

## 2020-01-01 RX ORDER — AMOXICILLIN AND CLAVULANATE POTASSIUM 500; 125 MG/1; MG/1
1 TABLET, FILM COATED ORAL DAILY
Qty: 2 TABLET | Refills: 0 | Status: ON HOLD | OUTPATIENT
Start: 2020-01-01 | End: 2020-01-01 | Stop reason: HOSPADM

## 2020-01-01 RX ORDER — SODIUM CHLORIDE 9 MG/ML
INJECTION, SOLUTION INTRAVENOUS ONCE
Status: DISCONTINUED | OUTPATIENT
Start: 2020-08-12 | End: 2020-08-12 | Stop reason: HOSPADM

## 2020-01-01 RX ORDER — GUAIFENESIN/DEXTROMETHORPHAN 100-10MG/5
10 SYRUP ORAL EVERY 4 HOURS PRN
Status: DISCONTINUED | OUTPATIENT
Start: 2020-01-01 | End: 2020-01-01 | Stop reason: HOSPADM

## 2020-01-01 RX ORDER — METOCLOPRAMIDE 10 MG/1
10 TABLET ORAL 4 TIMES DAILY
Status: ON HOLD | COMMUNITY
End: 2020-01-01 | Stop reason: HOSPADM

## 2020-01-01 RX ORDER — METOCLOPRAMIDE 10 MG/1
10 TABLET ORAL
Status: DISCONTINUED | OUTPATIENT
Start: 2020-01-01 | End: 2020-01-01 | Stop reason: HOSPADM

## 2020-01-01 RX ORDER — PROCHLORPERAZINE EDISYLATE 5 MG/ML
5 INJECTION INTRAMUSCULAR; INTRAVENOUS EVERY 6 HOURS PRN
Status: DISCONTINUED | OUTPATIENT
Start: 2020-01-01 | End: 2020-08-12 | Stop reason: HOSPADM

## 2020-01-01 RX ORDER — HEPARIN SODIUM,PORCINE/D5W 25000/250
12 INTRAVENOUS SOLUTION INTRAVENOUS CONTINUOUS
Status: DISCONTINUED | OUTPATIENT
Start: 2020-01-01 | End: 2020-01-01

## 2020-01-01 RX ORDER — INSULIN ASPART 100 [IU]/ML
0-5 INJECTION, SOLUTION INTRAVENOUS; SUBCUTANEOUS EVERY 4 HOURS PRN
Status: DISCONTINUED | OUTPATIENT
Start: 2020-01-01 | End: 2020-01-01

## 2020-01-01 RX ORDER — PANTOPRAZOLE SODIUM 40 MG/10ML
40 INJECTION, POWDER, LYOPHILIZED, FOR SOLUTION INTRAVENOUS EVERY 24 HOURS
Status: DISCONTINUED | OUTPATIENT
Start: 2020-01-01 | End: 2020-01-01

## 2020-01-01 RX ORDER — PROPOFOL 10 MG/ML
VIAL (ML) INTRAVENOUS
Status: DISCONTINUED | OUTPATIENT
Start: 2020-01-01 | End: 2020-01-01

## 2020-01-01 RX ORDER — AMOXICILLIN AND CLAVULANATE POTASSIUM 500; 125 MG/1; MG/1
1 TABLET, FILM COATED ORAL DAILY
Qty: 2 TABLET | Refills: 0 | Status: SHIPPED | OUTPATIENT
Start: 2020-01-01 | End: 2020-01-01

## 2020-01-01 RX ORDER — ALBUTEROL SULFATE 90 UG/1
2 AEROSOL, METERED RESPIRATORY (INHALATION) EVERY 6 HOURS PRN
Status: DISCONTINUED | OUTPATIENT
Start: 2020-01-01 | End: 2020-01-01 | Stop reason: HOSPADM

## 2020-01-01 RX ORDER — HEPARIN SODIUM 5000 [USP'U]/ML
5000 INJECTION, SOLUTION INTRAVENOUS; SUBCUTANEOUS EVERY 8 HOURS
Status: DISCONTINUED | OUTPATIENT
Start: 2020-01-01 | End: 2020-01-01

## 2020-01-01 RX ORDER — ATORVASTATIN CALCIUM 20 MG/1
40 TABLET, FILM COATED ORAL NIGHTLY
Status: DISCONTINUED | OUTPATIENT
Start: 2020-01-01 | End: 2020-01-01 | Stop reason: HOSPADM

## 2020-01-01 RX ORDER — CINACALCET 30 MG/1
60 TABLET, FILM COATED ORAL ONCE
Status: COMPLETED | OUTPATIENT
Start: 2020-01-01 | End: 2020-01-01

## 2020-01-01 RX ORDER — BISACODYL 10 MG
10 SUPPOSITORY, RECTAL RECTAL ONCE
Status: COMPLETED | OUTPATIENT
Start: 2020-01-01 | End: 2020-01-01

## 2020-01-01 RX ORDER — SUCCINYLCHOLINE CHLORIDE 20 MG/ML
INJECTION INTRAMUSCULAR; INTRAVENOUS
Status: DISCONTINUED | OUTPATIENT
Start: 2020-01-01 | End: 2020-01-01

## 2020-01-01 RX ORDER — METOCLOPRAMIDE 5 MG/1
10 TABLET ORAL 4 TIMES DAILY
Status: DISCONTINUED | OUTPATIENT
Start: 2020-01-01 | End: 2020-01-01

## 2020-01-01 RX ORDER — INSULIN ASPART 100 [IU]/ML
0-5 INJECTION, SOLUTION INTRAVENOUS; SUBCUTANEOUS EVERY 6 HOURS PRN
Status: DISCONTINUED | OUTPATIENT
Start: 2020-01-01 | End: 2020-01-01 | Stop reason: HOSPADM

## 2020-01-01 RX ORDER — HYDRALAZINE HYDROCHLORIDE 20 MG/ML
5 INJECTION INTRAMUSCULAR; INTRAVENOUS EVERY 8 HOURS PRN
Status: DISCONTINUED | OUTPATIENT
Start: 2020-01-01 | End: 2020-01-01

## 2020-01-01 RX ORDER — LIDOCAINE 50 MG/G
1 PATCH TOPICAL
Status: DISCONTINUED | OUTPATIENT
Start: 2020-01-01 | End: 2020-01-01 | Stop reason: HOSPADM

## 2020-01-01 RX ORDER — ALBUTEROL SULFATE 90 UG/1
2 AEROSOL, METERED RESPIRATORY (INHALATION) EVERY 6 HOURS
Qty: 18 G | Refills: 0
Start: 2020-01-01 | End: 2021-05-04

## 2020-01-01 RX ORDER — LEVETIRACETAM 100 MG/ML
250 SOLUTION ORAL 2 TIMES DAILY
Qty: 150 ML | Refills: 11
Start: 2020-01-01 | End: 2021-05-04

## 2020-01-01 RX ORDER — MIDODRINE HYDROCHLORIDE 5 MG/1
5 TABLET ORAL
Start: 2020-01-01 | End: 2021-05-04

## 2020-01-01 RX ORDER — ATORVASTATIN CALCIUM 20 MG/1
40 TABLET, FILM COATED ORAL NIGHTLY
Status: DISCONTINUED | OUTPATIENT
Start: 2020-01-01 | End: 2020-01-01

## 2020-01-01 RX ORDER — ACETAMINOPHEN 325 MG/1
325 TABLET ORAL EVERY 4 HOURS PRN
Status: DISCONTINUED | OUTPATIENT
Start: 2020-01-01 | End: 2020-01-01

## 2020-01-01 RX ORDER — IPRATROPIUM BROMIDE AND ALBUTEROL SULFATE 2.5; .5 MG/3ML; MG/3ML
3 SOLUTION RESPIRATORY (INHALATION) EVERY 4 HOURS PRN
Status: DISCONTINUED | OUTPATIENT
Start: 2020-01-01 | End: 2020-01-01 | Stop reason: HOSPADM

## 2020-01-01 RX ORDER — ONDANSETRON 2 MG/ML
4 INJECTION INTRAMUSCULAR; INTRAVENOUS EVERY 8 HOURS PRN
Status: DISCONTINUED | OUTPATIENT
Start: 2020-01-01 | End: 2020-08-12 | Stop reason: HOSPADM

## 2020-01-01 RX ORDER — PROPOFOL 10 MG/ML
INJECTION, EMULSION INTRAVENOUS
Status: COMPLETED
Start: 2020-01-01 | End: 2020-01-01

## 2020-01-01 RX ORDER — MUPIROCIN 20 MG/G
OINTMENT TOPICAL 2 TIMES DAILY
Status: CANCELLED | OUTPATIENT
Start: 2020-01-01 | End: 2020-01-01

## 2020-01-01 RX ORDER — IBUPROFEN 200 MG
24 TABLET ORAL
Status: DISCONTINUED | OUTPATIENT
Start: 2020-01-01 | End: 2020-01-01 | Stop reason: HOSPADM

## 2020-01-01 RX ORDER — CARVEDILOL 3.12 MG/1
3.12 TABLET ORAL 2 TIMES DAILY WITH MEALS
Status: DISCONTINUED | OUTPATIENT
Start: 2020-01-01 | End: 2020-01-01

## 2020-01-01 RX ORDER — DOXYCYCLINE HYCLATE 100 MG/1
100 TABLET, DELAYED RELEASE ORAL 2 TIMES DAILY
Status: ON HOLD | COMMUNITY
End: 2020-01-01 | Stop reason: HOSPADM

## 2020-01-01 RX ORDER — METOPROLOL SUCCINATE 50 MG/1
50 TABLET, EXTENDED RELEASE ORAL DAILY
Status: DISCONTINUED | OUTPATIENT
Start: 2020-01-01 | End: 2020-01-01

## 2020-01-01 RX ORDER — PROMETHAZINE HYDROCHLORIDE 25 MG/ML
25 INJECTION, SOLUTION INTRAMUSCULAR; INTRAVENOUS
Status: COMPLETED | OUTPATIENT
Start: 2020-01-01 | End: 2020-01-01

## 2020-01-01 RX ORDER — POLYETHYLENE GLYCOL 3350 17 G/17G
17 POWDER, FOR SOLUTION ORAL 2 TIMES DAILY PRN
Status: DISCONTINUED | OUTPATIENT
Start: 2020-01-01 | End: 2020-01-01

## 2020-01-01 RX ORDER — VANCOMYCIN HCL IN 5 % DEXTROSE 1G/250ML
1000 PLASTIC BAG, INJECTION (ML) INTRAVENOUS ONCE
Status: COMPLETED | OUTPATIENT
Start: 2020-01-01 | End: 2020-01-01

## 2020-01-01 RX ORDER — ONDANSETRON 8 MG/1
8 TABLET, ORALLY DISINTEGRATING ORAL
Status: COMPLETED | OUTPATIENT
Start: 2020-01-01 | End: 2020-01-01

## 2020-01-01 RX ORDER — MIDAZOLAM HYDROCHLORIDE 1 MG/ML
1 INJECTION INTRAMUSCULAR; INTRAVENOUS
Status: COMPLETED | OUTPATIENT
Start: 2020-01-01 | End: 2020-01-01

## 2020-01-01 RX ORDER — GLUCAGON 1 MG
1 KIT INJECTION
Status: DISCONTINUED | OUTPATIENT
Start: 2020-01-01 | End: 2020-01-01 | Stop reason: HOSPADM

## 2020-01-01 RX ORDER — SENNOSIDES 8.6 MG/1
2 TABLET ORAL DAILY
Status: ON HOLD | COMMUNITY
End: 2020-01-01 | Stop reason: HOSPADM

## 2020-01-01 RX ORDER — METOCLOPRAMIDE HYDROCHLORIDE 5 MG/ML
10 INJECTION INTRAMUSCULAR; INTRAVENOUS ONCE
Status: DISCONTINUED | OUTPATIENT
Start: 2020-01-01 | End: 2020-01-01

## 2020-01-01 RX ORDER — LABETALOL HCL 20 MG/4 ML
10 SYRINGE (ML) INTRAVENOUS EVERY 6 HOURS PRN
Status: DISCONTINUED | OUTPATIENT
Start: 2020-01-01 | End: 2020-01-01 | Stop reason: HOSPADM

## 2020-01-01 RX ORDER — SODIUM CHLORIDE 9 MG/ML
INJECTION, SOLUTION INTRAVENOUS ONCE
Status: DISCONTINUED | OUTPATIENT
Start: 2020-01-01 | End: 2020-01-01 | Stop reason: HOSPADM

## 2020-01-01 RX ORDER — ONDANSETRON 2 MG/ML
4 INJECTION INTRAMUSCULAR; INTRAVENOUS EVERY 6 HOURS PRN
Status: DISCONTINUED | OUTPATIENT
Start: 2020-01-01 | End: 2020-01-01 | Stop reason: HOSPADM

## 2020-01-01 RX ORDER — AZITHROMYCIN 250 MG/1
TABLET, FILM COATED ORAL
Qty: 6 TABLET | Refills: 0 | Status: SHIPPED | OUTPATIENT
Start: 2020-01-01 | End: 2020-01-01 | Stop reason: CLARIF

## 2020-01-01 RX ORDER — AMOXICILLIN 250 MG
1 CAPSULE ORAL DAILY PRN
Status: DISCONTINUED | OUTPATIENT
Start: 2020-01-01 | End: 2020-01-01

## 2020-01-01 RX ORDER — INSULIN ASPART 100 [IU]/ML
0-5 INJECTION, SOLUTION INTRAVENOUS; SUBCUTANEOUS
Status: DISCONTINUED | OUTPATIENT
Start: 2020-01-01 | End: 2020-08-12 | Stop reason: HOSPADM

## 2020-01-01 RX ORDER — ROCURONIUM BROMIDE 10 MG/ML
INJECTION, SOLUTION INTRAVENOUS
Status: DISCONTINUED | OUTPATIENT
Start: 2020-01-01 | End: 2020-01-01

## 2020-01-01 RX ORDER — ALBUTEROL SULFATE 2.5 MG/.5ML
10 SOLUTION RESPIRATORY (INHALATION) ONCE
Status: DISCONTINUED | OUTPATIENT
Start: 2020-01-01 | End: 2020-01-01

## 2020-01-01 RX ORDER — PROMETHAZINE HYDROCHLORIDE 25 MG/1
25 TABLET ORAL EVERY 6 HOURS PRN
Qty: 15 TABLET | Refills: 0 | Status: ON HOLD | OUTPATIENT
Start: 2020-01-01 | End: 2020-01-01 | Stop reason: HOSPADM

## 2020-01-01 RX ORDER — MIDODRINE HYDROCHLORIDE 5 MG/1
5 TABLET ORAL
Status: DISCONTINUED | OUTPATIENT
Start: 2020-01-01 | End: 2020-08-12 | Stop reason: HOSPADM

## 2020-01-01 RX ORDER — MIDODRINE HYDROCHLORIDE 5 MG/1
5 TABLET ORAL
Qty: 60 TABLET | Refills: 3 | Status: ON HOLD | OUTPATIENT
Start: 2020-01-01 | End: 2020-01-01 | Stop reason: HOSPADM

## 2020-01-01 RX ORDER — INSULIN ASPART 100 [IU]/ML
0-5 INJECTION, SOLUTION INTRAVENOUS; SUBCUTANEOUS
Status: DISCONTINUED | OUTPATIENT
Start: 2020-01-01 | End: 2020-01-01 | Stop reason: HOSPADM

## 2020-01-01 RX ORDER — LIDOCAINE HYDROCHLORIDE 10 MG/ML
1 INJECTION INFILTRATION; PERINEURAL ONCE AS NEEDED
Status: DISCONTINUED | OUTPATIENT
Start: 2020-01-01 | End: 2020-01-01

## 2020-01-01 RX ORDER — HYDRALAZINE HYDROCHLORIDE 20 MG/ML
10 INJECTION INTRAMUSCULAR; INTRAVENOUS EVERY 6 HOURS PRN
Status: DISCONTINUED | OUTPATIENT
Start: 2020-01-01 | End: 2020-01-01

## 2020-01-01 RX ORDER — IBUPROFEN 200 MG
16 TABLET ORAL
Status: DISCONTINUED | OUTPATIENT
Start: 2020-01-01 | End: 2020-08-12 | Stop reason: HOSPADM

## 2020-01-01 RX ORDER — SODIUM CHLORIDE 0.9 % (FLUSH) 0.9 %
10 SYRINGE (ML) INJECTION
Status: DISCONTINUED | OUTPATIENT
Start: 2020-01-01 | End: 2020-08-12 | Stop reason: HOSPADM

## 2020-01-01 RX ORDER — SYRING-NEEDL,DISP,INSUL,0.3 ML 29 G X1/2"
296 SYRINGE, EMPTY DISPOSABLE MISCELLANEOUS
Status: COMPLETED | OUTPATIENT
Start: 2020-01-01 | End: 2020-01-01

## 2020-01-01 RX ORDER — TRIPROLIDINE/PSEUDOEPHEDRINE 2.5MG-60MG
400 TABLET ORAL ONCE
Status: DISCONTINUED | OUTPATIENT
Start: 2020-01-01 | End: 2020-01-01

## 2020-01-01 RX ORDER — GLUCAGON 1 MG
KIT INJECTION CODE/TRAUMA/SEDATION MEDICATION
Status: COMPLETED | OUTPATIENT
Start: 2020-01-01 | End: 2020-01-01

## 2020-01-01 RX ORDER — HYDROCODONE BITARTRATE AND ACETAMINOPHEN 5; 325 MG/1; MG/1
1 TABLET ORAL EVERY 6 HOURS PRN
Status: DISCONTINUED | OUTPATIENT
Start: 2020-01-01 | End: 2020-08-12 | Stop reason: HOSPADM

## 2020-01-01 RX ORDER — BISACODYL 10 MG
10 SUPPOSITORY, RECTAL RECTAL DAILY PRN
Status: DISCONTINUED | OUTPATIENT
Start: 2020-01-01 | End: 2020-01-01

## 2020-01-01 RX ORDER — INSULIN ASPART 100 [IU]/ML
0-5 INJECTION, SOLUTION INTRAVENOUS; SUBCUTANEOUS
Status: DISCONTINUED | OUTPATIENT
Start: 2020-01-01 | End: 2020-01-01

## 2020-01-01 RX ORDER — CEFAZOLIN SODIUM 1 G/50ML
SOLUTION INTRAVENOUS
Status: COMPLETED | OUTPATIENT
Start: 2020-01-01 | End: 2020-01-01

## 2020-01-01 RX ORDER — ROCURONIUM BROMIDE 10 MG/ML
80 INJECTION, SOLUTION INTRAVENOUS ONCE
Status: COMPLETED | OUTPATIENT
Start: 2020-01-01 | End: 2020-01-01

## 2020-01-01 RX ORDER — MIDODRINE HYDROCHLORIDE 5 MG/1
10 TABLET ORAL ONCE
Status: COMPLETED | OUTPATIENT
Start: 2020-01-01 | End: 2020-01-01

## 2020-01-01 RX ORDER — CARVEDILOL 3.12 MG/1
3.12 TABLET ORAL
Status: COMPLETED | OUTPATIENT
Start: 2020-01-01 | End: 2020-01-01

## 2020-01-01 RX ORDER — HYDROCODONE BITARTRATE AND ACETAMINOPHEN 5; 325 MG/1; MG/1
1 TABLET ORAL EVERY 6 HOURS PRN
Status: DISCONTINUED | OUTPATIENT
Start: 2020-01-01 | End: 2020-01-01 | Stop reason: HOSPADM

## 2020-01-01 RX ORDER — PANTOPRAZOLE SODIUM 40 MG/1
40 FOR SUSPENSION ORAL 2 TIMES DAILY
Status: DISCONTINUED | OUTPATIENT
Start: 2020-01-01 | End: 2020-08-12 | Stop reason: HOSPADM

## 2020-01-01 RX ORDER — PANTOPRAZOLE SODIUM 40 MG/10ML
80 INJECTION, POWDER, LYOPHILIZED, FOR SOLUTION INTRAVENOUS ONCE
Status: COMPLETED | OUTPATIENT
Start: 2020-01-01 | End: 2020-01-01

## 2020-01-01 RX ORDER — PANTOPRAZOLE SODIUM 40 MG/1
40 FOR SUSPENSION ORAL 2 TIMES DAILY
Qty: 60 PACKET | Refills: 11
Start: 2020-01-01 | End: 2021-05-04

## 2020-01-01 RX ORDER — SODIUM,POTASSIUM PHOSPHATES 280-250MG
1 POWDER IN PACKET (EA) ORAL
Status: COMPLETED | OUTPATIENT
Start: 2020-01-01 | End: 2020-01-01

## 2020-01-01 RX ORDER — LEVETIRACETAM 100 MG/ML
500 SOLUTION ORAL NIGHTLY
Status: DISCONTINUED | OUTPATIENT
Start: 2020-01-01 | End: 2020-01-01

## 2020-01-01 RX ORDER — FENTANYL CITRATE 50 UG/ML
INJECTION, SOLUTION INTRAMUSCULAR; INTRAVENOUS
Status: DISCONTINUED | OUTPATIENT
Start: 2020-01-01 | End: 2020-01-01

## 2020-01-01 RX ORDER — LEVETIRACETAM 100 MG/ML
250 SOLUTION ORAL 2 TIMES DAILY
Status: DISCONTINUED | OUTPATIENT
Start: 2020-01-01 | End: 2020-08-12 | Stop reason: HOSPADM

## 2020-01-01 RX ORDER — MIDAZOLAM HYDROCHLORIDE 1 MG/ML
INJECTION INTRAMUSCULAR; INTRAVENOUS CODE/TRAUMA/SEDATION MEDICATION
Status: COMPLETED | OUTPATIENT
Start: 2020-01-01 | End: 2020-01-01

## 2020-01-01 RX ORDER — BISACODYL 10 MG
10 SUPPOSITORY, RECTAL RECTAL DAILY PRN
Status: DISCONTINUED | OUTPATIENT
Start: 2020-01-01 | End: 2020-08-12 | Stop reason: HOSPADM

## 2020-01-01 RX ORDER — CEFTRIAXONE 1 G/1
1 INJECTION, POWDER, FOR SOLUTION INTRAMUSCULAR; INTRAVENOUS
Status: COMPLETED | OUTPATIENT
Start: 2020-01-01 | End: 2020-01-01

## 2020-01-01 RX ORDER — ALBUTEROL SULFATE 90 UG/1
2 AEROSOL, METERED RESPIRATORY (INHALATION) EVERY 8 HOURS
Status: DISCONTINUED | OUTPATIENT
Start: 2020-01-01 | End: 2020-01-01

## 2020-01-01 RX ORDER — ONDANSETRON 4 MG/1
4 TABLET, FILM COATED ORAL EVERY 8 HOURS
Status: DISCONTINUED | OUTPATIENT
Start: 2020-01-01 | End: 2020-01-01

## 2020-01-01 RX ORDER — MIDODRINE HYDROCHLORIDE 5 MG/1
5 TABLET ORAL
Qty: 60 TABLET | Refills: 3 | Status: ON HOLD | OUTPATIENT
Start: 2020-01-01 | End: 2020-01-01 | Stop reason: SDUPTHER

## 2020-01-01 RX ORDER — ROCURONIUM BROMIDE 10 MG/ML
INJECTION, SOLUTION INTRAVENOUS
Status: COMPLETED
Start: 2020-01-01 | End: 2020-01-01

## 2020-01-01 RX ORDER — METOCLOPRAMIDE 5 MG/1
5 TABLET ORAL 4 TIMES DAILY
Status: DISCONTINUED | OUTPATIENT
Start: 2020-01-01 | End: 2020-08-12 | Stop reason: HOSPADM

## 2020-01-01 RX ORDER — POTASSIUM CHLORIDE 20 MEQ/15ML
60 SOLUTION ORAL
Status: DISCONTINUED | OUTPATIENT
Start: 2020-01-01 | End: 2020-01-01

## 2020-01-01 RX ORDER — ACETAMINOPHEN 325 MG/1
650 TABLET ORAL
Status: COMPLETED | OUTPATIENT
Start: 2020-01-01 | End: 2020-01-01

## 2020-01-01 RX ORDER — GUAIFENESIN/DEXTROMETHORPHAN 100-10MG/5
10 SYRUP ORAL EVERY 4 HOURS PRN
Status: DISCONTINUED | OUTPATIENT
Start: 2020-01-01 | End: 2020-01-01

## 2020-01-01 RX ORDER — SENNOSIDES 8.6 MG/1
2 TABLET ORAL DAILY
Status: DISCONTINUED | OUTPATIENT
Start: 2020-01-01 | End: 2020-01-01 | Stop reason: HOSPADM

## 2020-01-01 RX ORDER — INSULIN ASPART 100 [IU]/ML
0-5 INJECTION, SOLUTION INTRAVENOUS; SUBCUTANEOUS EVERY 6 HOURS PRN
Status: DISCONTINUED | OUTPATIENT
Start: 2020-01-01 | End: 2020-01-01

## 2020-01-01 RX ORDER — ACETAMINOPHEN 325 MG/1
650 TABLET ORAL EVERY 4 HOURS PRN
Status: DISCONTINUED | OUTPATIENT
Start: 2020-01-01 | End: 2020-01-01

## 2020-01-01 RX ORDER — ACETAMINOPHEN 325 MG/1
650 TABLET ORAL EVERY 4 HOURS PRN
Status: DISCONTINUED | OUTPATIENT
Start: 2020-01-01 | End: 2020-08-12 | Stop reason: HOSPADM

## 2020-01-01 RX ORDER — SEVELAMER CARBONATE FOR ORAL SUSPENSION 800 MG/1
1.6 POWDER, FOR SUSPENSION ORAL 3 TIMES DAILY
Status: DISCONTINUED | OUTPATIENT
Start: 2020-01-01 | End: 2020-01-01

## 2020-01-01 RX ORDER — SODIUM CHLORIDE 9 MG/ML
INJECTION, SOLUTION INTRAVENOUS
Status: DISCONTINUED | OUTPATIENT
Start: 2020-01-01 | End: 2020-01-01 | Stop reason: SDUPTHER

## 2020-01-01 RX ORDER — PROMETHAZINE HYDROCHLORIDE 25 MG/1
25 TABLET ORAL EVERY 6 HOURS PRN
Qty: 15 TABLET | Refills: 0 | Status: SHIPPED | OUTPATIENT
Start: 2020-01-01 | End: 2020-01-01 | Stop reason: SDUPTHER

## 2020-01-01 RX ADMIN — ALBUTEROL SULFATE 2 PUFF: 90 AEROSOL, METERED RESPIRATORY (INHALATION) at 08:05

## 2020-01-01 RX ADMIN — LEVETIRACETAM 500 MG: 5 INJECTION INTRAVENOUS at 08:01

## 2020-01-01 RX ADMIN — METOCLOPRAMIDE 10 MG: 10 TABLET ORAL at 09:04

## 2020-01-01 RX ADMIN — SEVELAMER CARBONATE 1.6 G: 800 POWDER, FOR SUSPENSION ORAL at 02:02

## 2020-01-01 RX ADMIN — PANTOPRAZOLE SODIUM 80 MG: 40 INJECTION, POWDER, FOR SOLUTION INTRAVENOUS at 10:01

## 2020-01-01 RX ADMIN — LEVETIRACETAM 250 MG: 100 SOLUTION ORAL at 09:04

## 2020-01-01 RX ADMIN — PANTOPRAZOLE SODIUM 40 MG: 40 TABLET, DELAYED RELEASE ORAL at 09:04

## 2020-01-01 RX ADMIN — PIPERACILLIN SODIUM AND TAZOBACTAM SODIUM 4.5 G: 4; .5 INJECTION, POWDER, LYOPHILIZED, FOR SOLUTION INTRAVENOUS at 05:08

## 2020-01-01 RX ADMIN — ALBUTEROL SULFATE 2 PUFF: 90 AEROSOL, METERED RESPIRATORY (INHALATION) at 12:05

## 2020-01-01 RX ADMIN — SEVELAMER CARBONATE 0.8 G: 800 POWDER, FOR SUSPENSION ORAL at 06:04

## 2020-01-01 RX ADMIN — ACETAMINOPHEN 650 MG: 325 TABLET ORAL at 01:08

## 2020-01-01 RX ADMIN — SODIUM CHLORIDE: 0.9 INJECTION, SOLUTION INTRAVENOUS at 10:02

## 2020-01-01 RX ADMIN — IPRATROPIUM BROMIDE AND ALBUTEROL SULFATE 3 ML: .5; 2.5 SOLUTION RESPIRATORY (INHALATION) at 06:08

## 2020-01-01 RX ADMIN — THIAMINE HYDROCHLORIDE 100 MG: 100 INJECTION, SOLUTION INTRAMUSCULAR; INTRAVENOUS at 08:01

## 2020-01-01 RX ADMIN — ACETAMINOPHEN 650 MG: 325 TABLET ORAL at 07:03

## 2020-01-01 RX ADMIN — SENNOSIDES AND DOCUSATE SODIUM 1 TABLET: 8.6; 5 TABLET ORAL at 09:04

## 2020-01-01 RX ADMIN — CARVEDILOL 3.12 MG: 3.12 TABLET, FILM COATED ORAL at 08:01

## 2020-01-01 RX ADMIN — CHLORHEXIDINE GLUCONATE 0.12% ORAL RINSE 15 ML: 1.2 LIQUID ORAL at 09:01

## 2020-01-01 RX ADMIN — PANTOPRAZOLE SODIUM 40 MG: 40 GRANULE, DELAYED RELEASE ORAL at 08:01

## 2020-01-01 RX ADMIN — PANTOPRAZOLE SODIUM 40 MG: 40 INJECTION, POWDER, FOR SOLUTION INTRAVENOUS at 09:02

## 2020-01-01 RX ADMIN — SEVELAMER CARBONATE 800 MG: 800 TABLET, FILM COATED ORAL at 05:04

## 2020-01-01 RX ADMIN — HEPARIN SODIUM AND DEXTROSE 18 UNITS/KG/HR: 10000; 5 INJECTION INTRAVENOUS at 02:05

## 2020-01-01 RX ADMIN — ROCURONIUM BROMIDE 80 MG: 10 INJECTION, SOLUTION INTRAVENOUS at 01:01

## 2020-01-01 RX ADMIN — SEVELAMER CARBONATE 1.6 G: 800 POWDER, FOR SUSPENSION ORAL at 08:01

## 2020-01-01 RX ADMIN — METOCLOPRAMIDE 10 MG: 10 TABLET ORAL at 11:04

## 2020-01-01 RX ADMIN — SEVELAMER CARBONATE 1.6 G: 800 POWDER, FOR SUSPENSION ORAL at 08:02

## 2020-01-01 RX ADMIN — INSULIN DETEMIR 5 UNITS: 100 INJECTION, SOLUTION SUBCUTANEOUS at 09:02

## 2020-01-01 RX ADMIN — ALBUTEROL SULFATE 2 PUFF: 90 AEROSOL, METERED RESPIRATORY (INHALATION) at 04:03

## 2020-01-01 RX ADMIN — SEVELAMER CARBONATE 800 MG: 800 TABLET, FILM COATED ORAL at 05:03

## 2020-01-01 RX ADMIN — PIPERACILLIN SODIUM,TAZOBACTAM SODIUM 4.5 G: 4; .5 INJECTION, POWDER, FOR SOLUTION INTRAVENOUS at 11:04

## 2020-01-01 RX ADMIN — PANTOPRAZOLE SODIUM 40 MG: 40 INJECTION, POWDER, FOR SOLUTION INTRAVENOUS at 08:01

## 2020-01-01 RX ADMIN — LEVETIRACETAM 250 MG: 100 SOLUTION ORAL at 09:02

## 2020-01-01 RX ADMIN — ACETAMINOPHEN 650 MG: 325 TABLET ORAL at 11:04

## 2020-01-01 RX ADMIN — AMPICILLIN SODIUM AND SULBACTAM SODIUM 3 G: 2; 1 INJECTION, POWDER, FOR SOLUTION INTRAMUSCULAR; INTRAVENOUS at 02:08

## 2020-01-01 RX ADMIN — ACETAMINOPHEN 650 MG: 325 TABLET ORAL at 09:04

## 2020-01-01 RX ADMIN — ACETAMINOPHEN 650 MG: 650 SUPPOSITORY RECTAL at 08:08

## 2020-01-01 RX ADMIN — METOCLOPRAMIDE 5 MG: 5 TABLET ORAL at 08:08

## 2020-01-01 RX ADMIN — ONDANSETRON 4 MG: 2 INJECTION INTRAMUSCULAR; INTRAVENOUS at 07:02

## 2020-01-01 RX ADMIN — PANTOPRAZOLE SODIUM 40 MG: 40 GRANULE, DELAYED RELEASE ORAL at 08:08

## 2020-01-01 RX ADMIN — SEVELAMER CARBONATE 800 MG: 800 TABLET, FILM COATED ORAL at 08:04

## 2020-01-01 RX ADMIN — SENNOSIDES AND DOCUSATE SODIUM 1 TABLET: 8.6; 5 TABLET ORAL at 08:05

## 2020-01-01 RX ADMIN — LEVETIRACETAM 250 MG: 100 SOLUTION ORAL at 08:04

## 2020-01-01 RX ADMIN — PIPERACILLIN SODIUM,TAZOBACTAM SODIUM 4.5 G: 4; .5 INJECTION, POWDER, FOR SOLUTION INTRAVENOUS at 08:04

## 2020-01-01 RX ADMIN — PROCHLORPERAZINE EDISYLATE 5 MG: 5 INJECTION INTRAMUSCULAR; INTRAVENOUS at 04:02

## 2020-01-01 RX ADMIN — ALBUTEROL SULFATE 2 PUFF: 90 AEROSOL, METERED RESPIRATORY (INHALATION) at 07:04

## 2020-01-01 RX ADMIN — SEVELAMER CARBONATE 800 MG: 800 TABLET, FILM COATED ORAL at 10:04

## 2020-01-01 RX ADMIN — DOXYCYCLINE HYCLATE 100 MG: 100 TABLET, COATED ORAL at 09:04

## 2020-01-01 RX ADMIN — ALBUTEROL SULFATE 2 PUFF: 90 AEROSOL, METERED RESPIRATORY (INHALATION) at 01:04

## 2020-01-01 RX ADMIN — HEPARIN SODIUM 5000 UNITS: 5000 INJECTION, SOLUTION INTRAVENOUS; SUBCUTANEOUS at 03:02

## 2020-01-01 RX ADMIN — SEVELAMER CARBONATE 1.6 G: 800 POWDER, FOR SUSPENSION ORAL at 09:02

## 2020-01-01 RX ADMIN — HEPARIN SODIUM 5000 UNITS: 5000 INJECTION, SOLUTION INTRAVENOUS; SUBCUTANEOUS at 03:01

## 2020-01-01 RX ADMIN — PHENYLEPHRINE HYDROCHLORIDE 200 MCG: 10 INJECTION INTRAVENOUS at 11:02

## 2020-01-01 RX ADMIN — METOCLOPRAMIDE 10 MG: 10 TABLET ORAL at 10:04

## 2020-01-01 RX ADMIN — ALBUTEROL SULFATE 2 PUFF: 90 AEROSOL, METERED RESPIRATORY (INHALATION) at 12:04

## 2020-01-01 RX ADMIN — METOCLOPRAMIDE HYDROCHLORIDE 5 MG: 5 TABLET ORAL at 09:04

## 2020-01-01 RX ADMIN — ALBUTEROL SULFATE 2 PUFF: 90 AEROSOL, METERED RESPIRATORY (INHALATION) at 08:03

## 2020-01-01 RX ADMIN — DOXYCYCLINE HYCLATE 100 MG: 100 TABLET, COATED ORAL at 09:03

## 2020-01-01 RX ADMIN — LEVETIRACETAM 250 MG: 100 SOLUTION ORAL at 08:08

## 2020-01-01 RX ADMIN — PIPERACILLIN SODIUM,TAZOBACTAM SODIUM 4.5 G: 4; .5 INJECTION, POWDER, FOR SOLUTION INTRAVENOUS at 10:04

## 2020-01-01 RX ADMIN — ALBUTEROL SULFATE 2 PUFF: 90 AEROSOL, METERED RESPIRATORY (INHALATION) at 02:05

## 2020-01-01 RX ADMIN — METOCLOPRAMIDE HYDROCHLORIDE 10 MG: 5 TABLET ORAL at 04:04

## 2020-01-01 RX ADMIN — PANTOPRAZOLE SODIUM 40 MG: 40 GRANULE, DELAYED RELEASE ORAL at 09:08

## 2020-01-01 RX ADMIN — METOCLOPRAMIDE 10 MG: 10 TABLET ORAL at 06:04

## 2020-01-01 RX ADMIN — PANTOPRAZOLE SODIUM 40 MG: 40 INJECTION, POWDER, FOR SOLUTION INTRAVENOUS at 09:01

## 2020-01-01 RX ADMIN — PANTOPRAZOLE SODIUM 40 MG: 40 INJECTION, POWDER, FOR SOLUTION INTRAVENOUS at 10:02

## 2020-01-01 RX ADMIN — SENNOSIDES 2 TABLET: 8.6 TABLET, FILM COATED ORAL at 09:04

## 2020-01-01 RX ADMIN — AMOXICILLIN AND CLAVULANATE POTASSIUM 500 MG: 500; 125 TABLET, FILM COATED ORAL at 09:02

## 2020-01-01 RX ADMIN — CEFAZOLIN SODIUM 1 G: 1 SOLUTION INTRAVENOUS at 09:02

## 2020-01-01 RX ADMIN — HEPARIN SODIUM AND DEXTROSE 15 UNITS/KG/HR: 10000; 5 INJECTION INTRAVENOUS at 07:04

## 2020-01-01 RX ADMIN — METOCLOPRAMIDE HYDROCHLORIDE 5 MG: 5 TABLET ORAL at 03:04

## 2020-01-01 RX ADMIN — SODIUM CHLORIDE: 0.9 INJECTION, SOLUTION INTRAVENOUS at 03:02

## 2020-01-01 RX ADMIN — HEPARIN SODIUM 5000 UNITS: 5000 INJECTION, SOLUTION INTRAVENOUS; SUBCUTANEOUS at 06:02

## 2020-01-01 RX ADMIN — SUCCINYLCHOLINE CHLORIDE 160 MG: 20 INJECTION, SOLUTION INTRAMUSCULAR; INTRAVENOUS at 11:02

## 2020-01-01 RX ADMIN — PANTOPRAZOLE SODIUM 40 MG: 40 TABLET, DELAYED RELEASE ORAL at 08:04

## 2020-01-01 RX ADMIN — METOCLOPRAMIDE HYDROCHLORIDE 10 MG: 5 TABLET ORAL at 05:04

## 2020-01-01 RX ADMIN — ACETAMINOPHEN 325 MG: 325 SUPPOSITORY RECTAL at 01:05

## 2020-01-01 RX ADMIN — LEVETIRACETAM 250 MG: 100 SOLUTION ORAL at 08:02

## 2020-01-01 RX ADMIN — LORAZEPAM 2 MG: 2 INJECTION INTRAMUSCULAR; INTRAVENOUS at 12:01

## 2020-01-01 RX ADMIN — ACETAMINOPHEN 650 MG: 325 TABLET ORAL at 08:04

## 2020-01-01 RX ADMIN — LEVETIRACETAM 250 MG: 100 SOLUTION ORAL at 11:04

## 2020-01-01 RX ADMIN — POLYETHYLENE GLYCOL 3350 17 G: 17 POWDER, FOR SOLUTION ORAL at 08:02

## 2020-01-01 RX ADMIN — ACETAMINOPHEN 650 MG: 325 TABLET ORAL at 05:05

## 2020-01-01 RX ADMIN — ACETAMINOPHEN 650 MG: 325 TABLET ORAL at 09:03

## 2020-01-01 RX ADMIN — METOCLOPRAMIDE 10 MG: 10 TABLET ORAL at 05:03

## 2020-01-01 RX ADMIN — PANTOPRAZOLE SODIUM 40 MG: 40 TABLET, DELAYED RELEASE ORAL at 09:02

## 2020-01-01 RX ADMIN — CARVEDILOL 3.12 MG: 3.12 TABLET, FILM COATED ORAL at 03:02

## 2020-01-01 RX ADMIN — PANTOPRAZOLE SODIUM 40 MG: 40 INJECTION, POWDER, LYOPHILIZED, FOR SOLUTION INTRAVENOUS at 09:03

## 2020-01-01 RX ADMIN — POTASSIUM & SODIUM PHOSPHATES POWDER PACK 280-160-250 MG 1 PACKET: 280-160-250 PACK at 02:05

## 2020-01-01 RX ADMIN — INSULIN ASPART 2 UNITS: 100 INJECTION, SOLUTION INTRAVENOUS; SUBCUTANEOUS at 05:02

## 2020-01-01 RX ADMIN — SODIUM CHLORIDE 500 ML: 0.9 INJECTION, SOLUTION INTRAVENOUS at 12:04

## 2020-01-01 RX ADMIN — ACETAMINOPHEN 650 MG: 325 TABLET ORAL at 03:08

## 2020-01-01 RX ADMIN — SODIUM CHLORIDE 500 ML: 0.9 INJECTION, SOLUTION INTRAVENOUS at 02:08

## 2020-01-01 RX ADMIN — METOCLOPRAMIDE 10 MG: 10 TABLET ORAL at 05:04

## 2020-01-01 RX ADMIN — SENNOSIDES 2 TABLET: 8.6 TABLET, FILM COATED ORAL at 09:03

## 2020-01-01 RX ADMIN — HEPARIN SODIUM 5000 UNITS: 5000 INJECTION, SOLUTION INTRAVENOUS; SUBCUTANEOUS at 05:02

## 2020-01-01 RX ADMIN — LEVETIRACETAM 250 MG: 100 SOLUTION ORAL at 09:03

## 2020-01-01 RX ADMIN — GUAIFENESIN AND DEXTROMETHORPHAN 10 ML: 100; 10 SYRUP ORAL at 09:04

## 2020-01-01 RX ADMIN — SODIUM CHLORIDE 500 ML: 0.9 INJECTION, SOLUTION INTRAVENOUS at 01:01

## 2020-01-01 RX ADMIN — METOCLOPRAMIDE HYDROCHLORIDE 10 MG: 5 TABLET ORAL at 09:04

## 2020-01-01 RX ADMIN — METOCLOPRAMIDE HYDROCHLORIDE 5 MG: 5 TABLET ORAL at 02:05

## 2020-01-01 RX ADMIN — METOCLOPRAMIDE HYDROCHLORIDE 5 MG: 5 TABLET ORAL at 02:04

## 2020-01-01 RX ADMIN — Medication 450 ML: at 02:02

## 2020-01-01 RX ADMIN — CARVEDILOL 3.12 MG: 3.12 TABLET, FILM COATED ORAL at 09:02

## 2020-01-01 RX ADMIN — LEVETIRACETAM 250 MG: 100 INJECTION, SOLUTION, CONCENTRATE INTRAVENOUS at 09:02

## 2020-01-01 RX ADMIN — SEVELAMER CARBONATE 1.6 G: 800 POWDER, FOR SUSPENSION ORAL at 06:02

## 2020-01-01 RX ADMIN — LEVETIRACETAM 250 MG: 100 SOLUTION ORAL at 12:04

## 2020-01-01 RX ADMIN — GUAIFENESIN 200 MG: 200 SOLUTION ORAL at 02:08

## 2020-01-01 RX ADMIN — ATORVASTATIN CALCIUM 40 MG: 20 TABLET, FILM COATED ORAL at 09:04

## 2020-01-01 RX ADMIN — MIDAZOLAM HYDROCHLORIDE 1 MG: 1 INJECTION, SOLUTION INTRAMUSCULAR; INTRAVENOUS at 09:02

## 2020-01-01 RX ADMIN — METOCLOPRAMIDE 10 MG: 10 TABLET ORAL at 02:03

## 2020-01-01 RX ADMIN — DOXYCYCLINE HYCLATE 100 MG: 100 TABLET, COATED ORAL at 10:03

## 2020-01-01 RX ADMIN — PANTOPRAZOLE SODIUM 40 MG: 40 INJECTION, POWDER, LYOPHILIZED, FOR SOLUTION INTRAVENOUS at 09:05

## 2020-01-01 RX ADMIN — INSULIN ASPART 1 UNITS: 100 INJECTION, SOLUTION INTRAVENOUS; SUBCUTANEOUS at 11:05

## 2020-01-01 RX ADMIN — CHLORHEXIDINE GLUCONATE 0.12% ORAL RINSE 15 ML: 1.2 LIQUID ORAL at 08:01

## 2020-01-01 RX ADMIN — HEPARIN SODIUM 5000 UNITS: 5000 INJECTION, SOLUTION INTRAVENOUS; SUBCUTANEOUS at 02:02

## 2020-01-01 RX ADMIN — PROPOFOL 150 MG: 10 INJECTION, EMULSION INTRAVENOUS at 11:02

## 2020-01-01 RX ADMIN — INSULIN ASPART 2 UNITS: 100 INJECTION, SOLUTION INTRAVENOUS; SUBCUTANEOUS at 06:02

## 2020-01-01 RX ADMIN — LEVETIRACETAM 250 MG: 100 SOLUTION ORAL at 10:03

## 2020-01-01 RX ADMIN — LIDOCAINE 1 PATCH: 50 PATCH TOPICAL at 09:02

## 2020-01-01 RX ADMIN — SODIUM CHLORIDE 500 ML: 0.9 INJECTION, SOLUTION INTRAVENOUS at 08:04

## 2020-01-01 RX ADMIN — ONDANSETRON 4 MG: 2 INJECTION INTRAMUSCULAR; INTRAVENOUS at 05:02

## 2020-01-01 RX ADMIN — METOCLOPRAMIDE HYDROCHLORIDE 5 MG: 5 TABLET ORAL at 09:05

## 2020-01-01 RX ADMIN — ATORVASTATIN CALCIUM 40 MG: 20 TABLET, FILM COATED ORAL at 09:03

## 2020-01-01 RX ADMIN — LEVETIRACETAM 250 MG: 100 SOLUTION ORAL at 10:02

## 2020-01-01 RX ADMIN — LEVETIRACETAM 250 MG: 100 SOLUTION ORAL at 03:02

## 2020-01-01 RX ADMIN — DOCUSATE SODIUM 50MG AND SENNOSIDES 8.6MG 1 TABLET: 8.6; 5 TABLET, FILM COATED ORAL at 09:08

## 2020-01-01 RX ADMIN — SEVELAMER CARBONATE 1.6 G: 800 POWDER, FOR SUSPENSION ORAL at 09:01

## 2020-01-01 RX ADMIN — LEVETIRACETAM 250 MG: 100 SOLUTION ORAL at 09:08

## 2020-01-01 RX ADMIN — GUAIFENESIN 200 MG: 200 SOLUTION ORAL at 10:08

## 2020-01-01 RX ADMIN — MIDODRINE HYDROCHLORIDE 10 MG: 5 TABLET ORAL at 08:04

## 2020-01-01 RX ADMIN — SENNOSIDES 2 TABLET: 8.6 TABLET, FILM COATED ORAL at 10:04

## 2020-01-01 RX ADMIN — PANTOPRAZOLE SODIUM 40 MG: 40 INJECTION, POWDER, FOR SOLUTION INTRAVENOUS at 08:02

## 2020-01-01 RX ADMIN — PANTOPRAZOLE SODIUM 40 MG: 40 INJECTION, POWDER, LYOPHILIZED, FOR SOLUTION INTRAVENOUS at 08:03

## 2020-01-01 RX ADMIN — CEFTRIAXONE 1 G: 1 INJECTION, POWDER, FOR SOLUTION INTRAMUSCULAR; INTRAVENOUS at 08:04

## 2020-01-01 RX ADMIN — PHENYLEPHRINE HYDROCHLORIDE 100 MCG: 10 INJECTION INTRAVENOUS at 11:02

## 2020-01-01 RX ADMIN — METOCLOPRAMIDE HYDROCHLORIDE 5 MG: 5 TABLET ORAL at 05:05

## 2020-01-01 RX ADMIN — NICARDIPINE HYDROCHLORIDE 2.5 MG/HR: 0.2 INJECTION, SOLUTION INTRAVENOUS at 02:01

## 2020-01-01 RX ADMIN — CEFTRIAXONE 1 G: 1 INJECTION, POWDER, FOR SOLUTION INTRAMUSCULAR; INTRAVENOUS at 09:03

## 2020-01-01 RX ADMIN — SODIUM CHLORIDE: 0.9 INJECTION, SOLUTION INTRAVENOUS at 02:04

## 2020-01-01 RX ADMIN — METOCLOPRAMIDE 10 MG: 10 TABLET ORAL at 04:04

## 2020-01-01 RX ADMIN — ONDANSETRON 4 MG: 2 INJECTION INTRAMUSCULAR; INTRAVENOUS at 03:02

## 2020-01-01 RX ADMIN — LEVETIRACETAM 500 MG: 100 SOLUTION ORAL at 09:01

## 2020-01-01 RX ADMIN — SENNOSIDES AND DOCUSATE SODIUM 1 TABLET: 8.6; 5 TABLET ORAL at 11:04

## 2020-01-01 RX ADMIN — SEVELAMER CARBONATE 1.6 G: 800 POWDER, FOR SUSPENSION ORAL at 10:02

## 2020-01-01 RX ADMIN — MIDODRINE HYDROCHLORIDE 5 MG: 5 TABLET ORAL at 06:03

## 2020-01-01 RX ADMIN — LEVETIRACETAM 250 MG: 100 SOLUTION ORAL at 01:02

## 2020-01-01 RX ADMIN — METOCLOPRAMIDE 10 MG: 10 TABLET ORAL at 08:04

## 2020-01-01 RX ADMIN — Medication 0.02 MCG/KG/MIN: at 12:01

## 2020-01-01 RX ADMIN — PIPERACILLIN SODIUM,TAZOBACTAM SODIUM 4.5 G: 4; .5 INJECTION, POWDER, FOR SOLUTION INTRAVENOUS at 09:01

## 2020-01-01 RX ADMIN — PANTOPRAZOLE SODIUM 40 MG: 40 INJECTION, POWDER, LYOPHILIZED, FOR SOLUTION INTRAVENOUS at 10:05

## 2020-01-01 RX ADMIN — METOCLOPRAMIDE HYDROCHLORIDE 5 MG: 5 TABLET ORAL at 10:05

## 2020-01-01 RX ADMIN — LEVETIRACETAM 250 MG: 100 SOLUTION ORAL at 10:05

## 2020-01-01 RX ADMIN — HEPARIN SODIUM 5000 UNITS: 5000 INJECTION, SOLUTION INTRAVENOUS; SUBCUTANEOUS at 05:01

## 2020-01-01 RX ADMIN — HEPARIN SODIUM 5000 UNITS: 5000 INJECTION, SOLUTION INTRAVENOUS; SUBCUTANEOUS at 02:01

## 2020-01-01 RX ADMIN — Medication 2 MG: at 11:08

## 2020-01-01 RX ADMIN — MAGNESIUM CITRATE 296 ML: 1.75 LIQUID ORAL at 06:01

## 2020-01-01 RX ADMIN — INSULIN ASPART 3 UNITS: 100 INJECTION, SOLUTION INTRAVENOUS; SUBCUTANEOUS at 10:05

## 2020-01-01 RX ADMIN — MIDODRINE HYDROCHLORIDE 5 MG: 5 TABLET ORAL at 05:02

## 2020-01-01 RX ADMIN — DOCUSATE SODIUM 50MG AND SENNOSIDES 8.6MG 1 TABLET: 8.6; 5 TABLET, FILM COATED ORAL at 08:08

## 2020-01-01 RX ADMIN — LEVETIRACETAM 500 MG: 5 INJECTION INTRAVENOUS at 09:01

## 2020-01-01 RX ADMIN — SENNOSIDES AND DOCUSATE SODIUM 1 TABLET: 8.6; 5 TABLET ORAL at 10:05

## 2020-01-01 RX ADMIN — SODIUM CHLORIDE: 0.9 INJECTION, SOLUTION INTRAVENOUS at 01:08

## 2020-01-01 RX ADMIN — PIPERACILLIN SODIUM AND TAZOBACTAM SODIUM 4.5 G: 4; .5 INJECTION, POWDER, LYOPHILIZED, FOR SOLUTION INTRAVENOUS at 03:08

## 2020-01-01 RX ADMIN — LEVETIRACETAM 500 MG: 100 SOLUTION ORAL at 09:02

## 2020-01-01 RX ADMIN — METOCLOPRAMIDE HYDROCHLORIDE 10 MG: 5 TABLET ORAL at 08:04

## 2020-01-01 RX ADMIN — ALBUTEROL SULFATE 2 PUFF: 90 AEROSOL, METERED RESPIRATORY (INHALATION) at 10:04

## 2020-01-01 RX ADMIN — ACETAMINOPHEN 650 MG: 325 TABLET ORAL at 11:01

## 2020-01-01 RX ADMIN — SODIUM CHLORIDE: 0.9 INJECTION, SOLUTION INTRAVENOUS at 07:04

## 2020-01-01 RX ADMIN — SEVELAMER CARBONATE 1.6 G: 800 POWDER, FOR SUSPENSION ORAL at 02:01

## 2020-01-01 RX ADMIN — SEVELAMER CARBONATE 1.6 G: 800 POWDER, FOR SUSPENSION ORAL at 11:01

## 2020-01-01 RX ADMIN — METOCLOPRAMIDE 5 MG: 5 TABLET ORAL at 01:08

## 2020-01-01 RX ADMIN — ALBUTEROL SULFATE 2 PUFF: 90 AEROSOL, METERED RESPIRATORY (INHALATION) at 08:04

## 2020-01-01 RX ADMIN — DEXTROSE 125 ML: 10 SOLUTION INTRAVENOUS at 08:02

## 2020-01-01 RX ADMIN — PIPERACILLIN SODIUM AND TAZOBACTAM SODIUM 4.5 G: 4; .5 INJECTION, POWDER, LYOPHILIZED, FOR SOLUTION INTRAVENOUS at 04:08

## 2020-01-01 RX ADMIN — PIPERACILLIN SODIUM,TAZOBACTAM SODIUM 4.5 G: 4; .5 INJECTION, POWDER, FOR SOLUTION INTRAVENOUS at 09:04

## 2020-01-01 RX ADMIN — METOCLOPRAMIDE 5 MG: 5 TABLET ORAL at 02:08

## 2020-01-01 RX ADMIN — Medication 100 ML: at 06:01

## 2020-01-01 RX ADMIN — ONDANSETRON 4 MG: 2 INJECTION INTRAMUSCULAR; INTRAVENOUS at 08:01

## 2020-01-01 RX ADMIN — LEVETIRACETAM 250 MG: 100 SOLUTION ORAL at 08:05

## 2020-01-01 RX ADMIN — FENTANYL CITRATE 50 MCG: 50 INJECTION, SOLUTION INTRAMUSCULAR; INTRAVENOUS at 09:02

## 2020-01-01 RX ADMIN — EPOETIN ALFA-EPBX 2000 UNITS: 2000 INJECTION, SOLUTION INTRAVENOUS; SUBCUTANEOUS at 10:05

## 2020-01-01 RX ADMIN — ONDANSETRON 8 MG: 8 TABLET, ORALLY DISINTEGRATING ORAL at 09:04

## 2020-01-01 RX ADMIN — SEVELAMER CARBONATE 800 MG: 800 TABLET, FILM COATED ORAL at 08:03

## 2020-01-01 RX ADMIN — PANTOPRAZOLE SODIUM 40 MG: 40 INJECTION, POWDER, LYOPHILIZED, FOR SOLUTION INTRAVENOUS at 08:04

## 2020-01-01 RX ADMIN — HEPARIN SODIUM 5000 UNITS: 5000 INJECTION, SOLUTION INTRAVENOUS; SUBCUTANEOUS at 11:01

## 2020-01-01 RX ADMIN — CARVEDILOL 3.12 MG: 3.12 TABLET, FILM COATED ORAL at 10:01

## 2020-01-01 RX ADMIN — ACETAMINOPHEN 650 MG: 325 TABLET ORAL at 08:05

## 2020-01-01 RX ADMIN — ACETAMINOPHEN 650 MG: 325 TABLET ORAL at 05:04

## 2020-01-01 RX ADMIN — SODIUM CHLORIDE: 0.9 INJECTION, SOLUTION INTRAVENOUS at 11:04

## 2020-01-01 RX ADMIN — ALBUTEROL SULFATE 2 PUFF: 90 AEROSOL, METERED RESPIRATORY (INHALATION) at 05:04

## 2020-01-01 RX ADMIN — ROCURONIUM BROMIDE 10 MG: 10 INJECTION, SOLUTION INTRAVENOUS at 11:02

## 2020-01-01 RX ADMIN — METOCLOPRAMIDE HYDROCHLORIDE 10 MG: 5 TABLET ORAL at 12:04

## 2020-01-01 RX ADMIN — BARIUM SULFATE 450 ML: 20 SUSPENSION ORAL at 12:02

## 2020-01-01 RX ADMIN — SEVELAMER CARBONATE 800 MG: 800 TABLET, FILM COATED ORAL at 12:04

## 2020-01-01 RX ADMIN — LEVETIRACETAM 250 MG: 100 SOLUTION ORAL at 03:04

## 2020-01-01 RX ADMIN — MIDODRINE HYDROCHLORIDE 5 MG: 5 TABLET ORAL at 05:04

## 2020-01-01 RX ADMIN — VANCOMYCIN HYDROCHLORIDE 1000 MG: 1 INJECTION, POWDER, LYOPHILIZED, FOR SOLUTION INTRAVENOUS at 10:08

## 2020-01-01 RX ADMIN — ALBUTEROL SULFATE 2 PUFF: 90 AEROSOL, METERED RESPIRATORY (INHALATION) at 02:03

## 2020-01-01 RX ADMIN — HYDRALAZINE HYDROCHLORIDE 25 MG: 25 TABLET, FILM COATED ORAL at 12:04

## 2020-01-01 RX ADMIN — SEVELAMER CARBONATE 1.6 G: 800 POWDER, FOR SUSPENSION ORAL at 03:01

## 2020-01-01 RX ADMIN — CARVEDILOL 3.12 MG: 3.12 TABLET, FILM COATED ORAL at 04:01

## 2020-01-01 RX ADMIN — CARVEDILOL 3.12 MG: 3.12 TABLET, FILM COATED ORAL at 09:01

## 2020-01-01 RX ADMIN — PANTOPRAZOLE SODIUM 40 MG: 40 GRANULE, DELAYED RELEASE ORAL at 08:02

## 2020-01-01 RX ADMIN — CINACALCET HYDROCHLORIDE 60 MG: 30 TABLET, FILM COATED ORAL at 01:04

## 2020-01-01 RX ADMIN — METOCLOPRAMIDE 10 MG: 10 TABLET ORAL at 09:03

## 2020-01-01 RX ADMIN — HEPARIN SODIUM 5000 UNITS: 5000 INJECTION, SOLUTION INTRAVENOUS; SUBCUTANEOUS at 01:02

## 2020-01-01 RX ADMIN — PANTOPRAZOLE SODIUM 40 MG: 40 GRANULE, DELAYED RELEASE ORAL at 01:08

## 2020-01-01 RX ADMIN — SODIUM POLYSTYRENE SULFONATE 30 G: 15 SUSPENSION ORAL; RECTAL at 03:02

## 2020-01-01 RX ADMIN — ACETAMINOPHEN 650 MG: 325 TABLET ORAL at 09:02

## 2020-01-01 RX ADMIN — METOCLOPRAMIDE HYDROCHLORIDE 5 MG: 5 TABLET ORAL at 08:05

## 2020-01-01 RX ADMIN — LEVETIRACETAM 250 MG: 100 SOLUTION ORAL at 10:04

## 2020-01-01 RX ADMIN — POTASSIUM & SODIUM PHOSPHATES POWDER PACK 280-160-250 MG 1 PACKET: 280-160-250 PACK at 10:05

## 2020-01-01 RX ADMIN — SODIUM CHLORIDE: 0.9 INJECTION, SOLUTION INTRAVENOUS at 12:02

## 2020-01-01 RX ADMIN — CARVEDILOL 3.12 MG: 3.12 TABLET, FILM COATED ORAL at 10:02

## 2020-01-01 RX ADMIN — PANTOPRAZOLE SODIUM 40 MG: 40 INJECTION, POWDER, FOR SOLUTION INTRAVENOUS at 11:02

## 2020-01-01 RX ADMIN — ONDANSETRON 8 MG: 8 TABLET, ORALLY DISINTEGRATING ORAL at 05:04

## 2020-01-01 RX ADMIN — METOCLOPRAMIDE 5 MG: 5 TABLET ORAL at 06:08

## 2020-01-01 RX ADMIN — MIDODRINE HYDROCHLORIDE 5 MG: 5 TABLET ORAL at 09:05

## 2020-01-01 RX ADMIN — LEVETIRACETAM 250 MG: 100 INJECTION, SOLUTION, CONCENTRATE INTRAVENOUS at 01:02

## 2020-01-01 RX ADMIN — SENNOSIDES 2 TABLET: 8.6 TABLET, FILM COATED ORAL at 12:04

## 2020-01-01 RX ADMIN — DOXYCYCLINE HYCLATE 100 MG: 100 TABLET, COATED ORAL at 08:03

## 2020-01-01 RX ADMIN — ONDANSETRON 4 MG: 2 INJECTION INTRAMUSCULAR; INTRAVENOUS at 02:03

## 2020-01-01 RX ADMIN — METOCLOPRAMIDE 10 MG: 10 TABLET ORAL at 12:04

## 2020-01-01 RX ADMIN — SODIUM CHLORIDE 500 ML: 0.9 INJECTION, SOLUTION INTRAVENOUS at 05:01

## 2020-01-01 RX ADMIN — Medication 2 G: at 04:01

## 2020-01-01 RX ADMIN — SODIUM CHLORIDE: 0.9 INJECTION, SOLUTION INTRAVENOUS at 10:04

## 2020-01-01 RX ADMIN — EPOETIN ALFA-EPBX 4900 UNITS: 10000 INJECTION, SOLUTION INTRAVENOUS; SUBCUTANEOUS at 11:08

## 2020-01-01 RX ADMIN — HEPARIN SODIUM 5000 UNITS: 5000 INJECTION, SOLUTION INTRAVENOUS; SUBCUTANEOUS at 06:01

## 2020-01-01 RX ADMIN — METOCLOPRAMIDE 10 MG: 10 TABLET ORAL at 06:03

## 2020-01-01 RX ADMIN — SEVELAMER CARBONATE 800 MG: 800 TABLET, FILM COATED ORAL at 04:04

## 2020-01-01 RX ADMIN — PANTOPRAZOLE SODIUM 40 MG: 40 INJECTION, POWDER, LYOPHILIZED, FOR SOLUTION INTRAVENOUS at 10:04

## 2020-01-01 RX ADMIN — SENNOSIDES 2 TABLET: 8.6 TABLET, FILM COATED ORAL at 08:04

## 2020-01-01 RX ADMIN — VANCOMYCIN HYDROCHLORIDE 1500 MG: 1.5 INJECTION, POWDER, LYOPHILIZED, FOR SOLUTION INTRAVENOUS at 05:01

## 2020-01-01 RX ADMIN — LIDOCAINE HYDROCHLORIDE 100 MG: 20 INJECTION, SOLUTION INTRAVENOUS at 11:02

## 2020-01-01 RX ADMIN — PANTOPRAZOLE SODIUM 40 MG: 40 GRANULE, DELAYED RELEASE ORAL at 09:01

## 2020-01-01 RX ADMIN — ETOMIDATE 20 MG: 2 INJECTION INTRAVENOUS at 01:01

## 2020-01-01 RX ADMIN — ACETAMINOPHEN 650 MG: 325 TABLET ORAL at 06:02

## 2020-01-01 RX ADMIN — LEVETIRACETAM 1000 MG: 10 INJECTION INTRAVENOUS at 09:01

## 2020-01-01 RX ADMIN — ACETAMINOPHEN 650 MG: 325 TABLET ORAL at 05:08

## 2020-01-01 RX ADMIN — SEVELAMER CARBONATE 800 MG: 800 TABLET, FILM COATED ORAL at 09:04

## 2020-01-01 RX ADMIN — Medication 6 MG: at 08:05

## 2020-01-01 RX ADMIN — SEVELAMER CARBONATE 800 MG: 800 TABLET, FILM COATED ORAL at 04:03

## 2020-01-01 RX ADMIN — ONDANSETRON 4 MG: 2 INJECTION INTRAMUSCULAR; INTRAVENOUS at 10:02

## 2020-01-01 RX ADMIN — SODIUM CHLORIDE: 0.9 INJECTION, SOLUTION INTRAVENOUS at 02:02

## 2020-01-01 RX ADMIN — SEVELAMER CARBONATE 1.6 G: 800 POWDER, FOR SUSPENSION ORAL at 07:02

## 2020-01-01 RX ADMIN — METOCLOPRAMIDE 5 MG: 5 TABLET ORAL at 09:08

## 2020-01-01 RX ADMIN — HEPARIN SODIUM 5000 UNITS: 5000 INJECTION, SOLUTION INTRAVENOUS; SUBCUTANEOUS at 09:02

## 2020-01-01 RX ADMIN — ATORVASTATIN CALCIUM 40 MG: 20 TABLET, FILM COATED ORAL at 08:04

## 2020-01-01 RX ADMIN — LEVETIRACETAM 250 MG: 100 SOLUTION ORAL at 01:08

## 2020-01-01 RX ADMIN — SODIUM CHLORIDE: 0.9 INJECTION, SOLUTION INTRAVENOUS at 08:02

## 2020-01-01 RX ADMIN — ACETAMINOPHEN 650 MG: 325 TABLET ORAL at 10:05

## 2020-01-01 RX ADMIN — PANTOPRAZOLE SODIUM 80 MG: 40 INJECTION, POWDER, FOR SOLUTION INTRAVENOUS at 02:03

## 2020-01-01 RX ADMIN — SEVELAMER CARBONATE 1.6 G: 800 POWDER, FOR SUSPENSION ORAL at 03:02

## 2020-01-01 RX ADMIN — SODIUM CHLORIDE 250 ML: 0.9 INJECTION, SOLUTION INTRAVENOUS at 09:08

## 2020-01-01 RX ADMIN — METOCLOPRAMIDE 5 MG: 5 TABLET ORAL at 05:08

## 2020-01-01 RX ADMIN — LEVETIRACETAM 250 MG: 100 INJECTION, SOLUTION, CONCENTRATE INTRAVENOUS at 10:02

## 2020-01-01 RX ADMIN — Medication 10 ML: at 09:01

## 2020-01-01 RX ADMIN — PIPERACILLIN AND TAZOBACTAM 4.5 G: 4; .5 INJECTION, POWDER, LYOPHILIZED, FOR SOLUTION INTRAVENOUS; PARENTERAL at 08:04

## 2020-01-01 RX ADMIN — AMOXICILLIN AND CLAVULANATE POTASSIUM 500 MG: 500; 125 TABLET, FILM COATED ORAL at 08:02

## 2020-01-01 RX ADMIN — POTASSIUM & SODIUM PHOSPHATES POWDER PACK 280-160-250 MG 1 PACKET: 280-160-250 PACK at 06:05

## 2020-01-01 RX ADMIN — PANTOPRAZOLE SODIUM 40 MG: 40 TABLET, DELAYED RELEASE ORAL at 02:04

## 2020-01-01 RX ADMIN — SODIUM CHLORIDE: 0.9 INJECTION, SOLUTION INTRAVENOUS at 04:03

## 2020-01-01 RX ADMIN — METOCLOPRAMIDE HYDROCHLORIDE 5 MG: 5 TABLET ORAL at 01:05

## 2020-01-01 RX ADMIN — CARVEDILOL 3.12 MG: 3.12 TABLET, FILM COATED ORAL at 08:02

## 2020-01-01 RX ADMIN — ACETAMINOPHEN 650 MG: 325 TABLET ORAL at 08:08

## 2020-01-01 RX ADMIN — CEFTRIAXONE 1 G: 1 INJECTION, POWDER, FOR SOLUTION INTRAMUSCULAR; INTRAVENOUS at 09:04

## 2020-01-01 RX ADMIN — ALBUTEROL SULFATE 2 PUFF: 90 AEROSOL, METERED RESPIRATORY (INHALATION) at 10:03

## 2020-01-01 RX ADMIN — PANTOPRAZOLE SODIUM 40 MG: 40 INJECTION, POWDER, FOR SOLUTION INTRAVENOUS at 07:02

## 2020-01-01 RX ADMIN — LEVETIRACETAM INJECTION 1500 MG: 15 INJECTION INTRAVENOUS at 01:01

## 2020-01-01 RX ADMIN — SEVELAMER CARBONATE 1.6 G: 800 POWDER, FOR SUSPENSION ORAL at 04:01

## 2020-01-01 RX ADMIN — SODIUM CHLORIDE: 0.9 INJECTION, SOLUTION INTRAVENOUS at 11:01

## 2020-01-01 RX ADMIN — SODIUM CHLORIDE: 0.9 INJECTION, SOLUTION INTRAVENOUS at 11:03

## 2020-01-01 RX ADMIN — PANTOPRAZOLE SODIUM 40 MG: 40 TABLET, DELAYED RELEASE ORAL at 07:02

## 2020-01-01 RX ADMIN — MIDAZOLAM HYDROCHLORIDE 1 MG: 1 INJECTION, SOLUTION INTRAMUSCULAR; INTRAVENOUS at 10:01

## 2020-01-01 RX ADMIN — PANTOPRAZOLE SODIUM 40 MG: 40 INJECTION, POWDER, LYOPHILIZED, FOR SOLUTION INTRAVENOUS at 09:04

## 2020-01-01 RX ADMIN — SODIUM CHLORIDE 500 ML: 0.9 INJECTION, SOLUTION INTRAVENOUS at 09:04

## 2020-01-01 RX ADMIN — METOCLOPRAMIDE 5 MG: 5 TABLET ORAL at 04:08

## 2020-01-01 RX ADMIN — HEPARIN SODIUM 5000 UNITS: 5000 INJECTION, SOLUTION INTRAVENOUS; SUBCUTANEOUS at 04:02

## 2020-01-01 RX ADMIN — PROPOFOL 5 MCG/KG/MIN: 10 INJECTION, EMULSION INTRAVENOUS at 01:01

## 2020-01-01 RX ADMIN — METOCLOPRAMIDE 10 MG: 10 TABLET ORAL at 04:03

## 2020-01-01 RX ADMIN — LOPERAMIDE HYDROCHLORIDE 2 MG: 2 CAPSULE ORAL at 09:04

## 2020-01-01 RX ADMIN — PANTOPRAZOLE SODIUM 40 MG: 40 INJECTION, POWDER, LYOPHILIZED, FOR SOLUTION INTRAVENOUS at 09:02

## 2020-01-01 RX ADMIN — ACYCLOVIR SODIUM 320 MG: 50 INJECTION, SOLUTION INTRAVENOUS at 03:01

## 2020-01-01 RX ADMIN — HEPARIN SODIUM 5000 UNITS: 5000 INJECTION, SOLUTION INTRAVENOUS; SUBCUTANEOUS at 10:02

## 2020-01-01 RX ADMIN — LEVETIRACETAM 500 MG: 5 INJECTION INTRAVENOUS at 05:01

## 2020-01-01 RX ADMIN — MIDODRINE HYDROCHLORIDE 5 MG: 5 TABLET ORAL at 02:02

## 2020-01-01 RX ADMIN — HEPARIN SODIUM 5000 UNITS: 5000 INJECTION, SOLUTION INTRAVENOUS; SUBCUTANEOUS at 09:01

## 2020-01-01 RX ADMIN — METOCLOPRAMIDE HYDROCHLORIDE 5 MG: 5 TABLET ORAL at 11:04

## 2020-01-01 RX ADMIN — DOXYCYCLINE HYCLATE 100 MG: 100 TABLET, COATED ORAL at 09:08

## 2020-01-01 RX ADMIN — ACETAMINOPHEN 650 MG: 325 TABLET ORAL at 08:01

## 2020-01-01 RX ADMIN — PIPERACILLIN SODIUM,TAZOBACTAM SODIUM 4.5 G: 4; .5 INJECTION, POWDER, FOR SOLUTION INTRAVENOUS at 11:01

## 2020-01-01 RX ADMIN — GUAIFENESIN 200 MG: 200 SOLUTION ORAL at 06:08

## 2020-01-01 RX ADMIN — CEFTRIAXONE 2 G: 2 INJECTION, SOLUTION INTRAVENOUS at 04:01

## 2020-01-01 RX ADMIN — HEPARIN SODIUM AND DEXTROSE 12 UNITS/KG/HR: 10000; 5 INJECTION INTRAVENOUS at 02:05

## 2020-01-01 RX ADMIN — HEPARIN SODIUM AND DEXTROSE 18 UNITS/KG/HR: 10000; 5 INJECTION INTRAVENOUS at 06:04

## 2020-01-01 RX ADMIN — Medication 10 ML: at 02:01

## 2020-01-01 RX ADMIN — PANTOPRAZOLE SODIUM 80 MG: 40 INJECTION, POWDER, FOR SOLUTION INTRAVENOUS at 12:01

## 2020-01-01 RX ADMIN — PANTOPRAZOLE SODIUM 40 MG: 40 TABLET, DELAYED RELEASE ORAL at 11:04

## 2020-01-01 RX ADMIN — METOCLOPRAMIDE 10 MG: 10 TABLET ORAL at 01:03

## 2020-01-01 RX ADMIN — DOCUSATE SODIUM 50MG AND SENNOSIDES 8.6MG 1 TABLET: 8.6; 5 TABLET, FILM COATED ORAL at 01:08

## 2020-01-01 RX ADMIN — ONDANSETRON 4 MG: 2 INJECTION INTRAMUSCULAR; INTRAVENOUS at 11:02

## 2020-01-01 RX ADMIN — IPRATROPIUM BROMIDE AND ALBUTEROL SULFATE 3 ML: .5; 2.5 SOLUTION RESPIRATORY (INHALATION) at 10:08

## 2020-01-01 RX ADMIN — VANCOMYCIN HYDROCHLORIDE 500 MG: 500 INJECTION, POWDER, LYOPHILIZED, FOR SOLUTION INTRAVENOUS at 12:01

## 2020-01-01 RX ADMIN — SODIUM CHLORIDE: 0.9 INJECTION, SOLUTION INTRAVENOUS at 08:08

## 2020-01-01 RX ADMIN — CEFTRIAXONE 1 G: 1 INJECTION, POWDER, FOR SOLUTION INTRAMUSCULAR; INTRAVENOUS at 08:03

## 2020-01-01 RX ADMIN — ATORVASTATIN CALCIUM 40 MG: 20 TABLET, FILM COATED ORAL at 10:04

## 2020-01-01 RX ADMIN — DEXTROSE 250 MG: 50 INJECTION, SOLUTION INTRAVENOUS at 01:02

## 2020-01-01 RX ADMIN — HEPARIN SODIUM AND DEXTROSE 12 UNITS/KG/HR: 10000; 5 INJECTION INTRAVENOUS at 02:04

## 2020-01-01 RX ADMIN — LEVETIRACETAM 500 MG: 5 INJECTION INTRAVENOUS at 10:01

## 2020-01-01 RX ADMIN — LEVETIRACETAM 500 MG: 5 INJECTION INTRAVENOUS at 11:01

## 2020-01-01 RX ADMIN — MIDODRINE HYDROCHLORIDE 5 MG: 5 TABLET ORAL at 09:02

## 2020-01-01 RX ADMIN — CEFTRIAXONE 2 G: 2 INJECTION, SOLUTION INTRAVENOUS at 02:01

## 2020-01-01 RX ADMIN — SEVELAMER CARBONATE 800 MG: 800 TABLET, FILM COATED ORAL at 02:03

## 2020-01-01 RX ADMIN — PANTOPRAZOLE SODIUM 40 MG: 40 INJECTION, POWDER, LYOPHILIZED, FOR SOLUTION INTRAVENOUS at 10:02

## 2020-01-01 RX ADMIN — CHLORHEXIDINE GLUCONATE 0.12% ORAL RINSE 15 ML: 1.2 LIQUID ORAL at 10:01

## 2020-01-01 RX ADMIN — IPRATROPIUM BROMIDE AND ALBUTEROL SULFATE 3 ML: .5; 2.5 SOLUTION RESPIRATORY (INHALATION) at 04:08

## 2020-01-01 RX ADMIN — METOCLOPRAMIDE HYDROCHLORIDE 10 MG: 5 TABLET ORAL at 10:04

## 2020-01-01 RX ADMIN — ONDANSETRON 8 MG: 8 TABLET, ORALLY DISINTEGRATING ORAL at 03:04

## 2020-01-01 RX ADMIN — LEVETIRACETAM 250 MG: 100 INJECTION, SOLUTION, CONCENTRATE INTRAVENOUS at 12:02

## 2020-01-01 RX ADMIN — LEVETIRACETAM 250 MG: 100 INJECTION, SOLUTION, CONCENTRATE INTRAVENOUS at 08:02

## 2020-01-01 RX ADMIN — IPRATROPIUM BROMIDE AND ALBUTEROL SULFATE 3 ML: .5; 3 SOLUTION RESPIRATORY (INHALATION) at 09:02

## 2020-01-01 RX ADMIN — ALBUTEROL SULFATE 2 PUFF: 90 AEROSOL, METERED RESPIRATORY (INHALATION) at 07:05

## 2020-01-01 RX ADMIN — SODIUM CHLORIDE: 0.9 INJECTION, SOLUTION INTRAVENOUS at 08:05

## 2020-01-01 RX ADMIN — INSULIN ASPART 2 UNITS: 100 INJECTION, SOLUTION INTRAVENOUS; SUBCUTANEOUS at 08:05

## 2020-01-01 RX ADMIN — VANCOMYCIN HYDROCHLORIDE 750 MG: 750 INJECTION, POWDER, LYOPHILIZED, FOR SOLUTION INTRAVENOUS at 01:08

## 2020-01-01 RX ADMIN — SEVELAMER CARBONATE 1.6 G: 800 POWDER, FOR SUSPENSION ORAL at 05:02

## 2020-01-01 RX ADMIN — PROMETHAZINE HYDROCHLORIDE 25 MG: 25 INJECTION INTRAMUSCULAR; INTRAVENOUS at 06:03

## 2020-01-01 RX ADMIN — HYDRALAZINE HYDROCHLORIDE 10 MG: 20 INJECTION INTRAMUSCULAR; INTRAVENOUS at 01:02

## 2020-01-01 RX ADMIN — INSULIN ASPART 1 UNITS: 100 INJECTION, SOLUTION INTRAVENOUS; SUBCUTANEOUS at 12:02

## 2020-01-01 RX ADMIN — SENNOSIDES AND DOCUSATE SODIUM 1 TABLET: 8.6; 5 TABLET ORAL at 09:05

## 2020-01-01 RX ADMIN — SODIUM CHLORIDE, SODIUM LACTATE, POTASSIUM CHLORIDE, AND CALCIUM CHLORIDE 1000 ML: .6; .31; .03; .02 INJECTION, SOLUTION INTRAVENOUS at 10:08

## 2020-01-01 RX ADMIN — INSULIN ASPART 2 UNITS: 100 INJECTION, SOLUTION INTRAVENOUS; SUBCUTANEOUS at 12:02

## 2020-01-01 RX ADMIN — GUAIFENESIN 200 MG: 200 SOLUTION ORAL at 09:08

## 2020-01-01 RX ADMIN — MIDODRINE HYDROCHLORIDE 5 MG: 5 TABLET ORAL at 08:01

## 2020-01-01 RX ADMIN — CEFTRIAXONE 2 G: 2 INJECTION, SOLUTION INTRAVENOUS at 03:01

## 2020-01-01 RX ADMIN — LEVETIRACETAM 250 MG: 100 SOLUTION ORAL at 01:03

## 2020-01-01 RX ADMIN — ACETAMINOPHEN 650 MG: 325 TABLET ORAL at 09:08

## 2020-01-01 RX ADMIN — ACETAMINOPHEN 650 MG: 325 TABLET ORAL at 10:03

## 2020-01-01 RX ADMIN — DOXYCYCLINE HYCLATE 100 MG: 100 TABLET, COATED ORAL at 11:04

## 2020-01-01 RX ADMIN — SEVELAMER CARBONATE 800 MG: 800 TABLET, FILM COATED ORAL at 01:04

## 2020-01-01 RX ADMIN — LEVETIRACETAM 250 MG: 100 SOLUTION ORAL at 07:02

## 2020-01-01 RX ADMIN — HEPARIN SODIUM AND DEXTROSE 18 UNITS/KG/HR: 10000; 5 INJECTION INTRAVENOUS at 02:04

## 2020-01-01 RX ADMIN — SUGAMMADEX 200 MG: 100 INJECTION, SOLUTION INTRAVENOUS at 11:02

## 2020-01-01 RX ADMIN — THIAMINE HYDROCHLORIDE 100 MG: 100 INJECTION, SOLUTION INTRAMUSCULAR; INTRAVENOUS at 12:01

## 2020-01-01 RX ADMIN — METOCLOPRAMIDE HYDROCHLORIDE 10 MG: 5 TABLET ORAL at 01:04

## 2020-01-01 RX ADMIN — MIDODRINE HYDROCHLORIDE 5 MG: 5 TABLET ORAL at 08:02

## 2020-01-01 RX ADMIN — PIPERACILLIN SODIUM,TAZOBACTAM SODIUM 4.5 G: 4; .5 INJECTION, POWDER, FOR SOLUTION INTRAVENOUS at 08:01

## 2020-01-01 RX ADMIN — HEPARIN SODIUM AND DEXTROSE 12 UNITS/KG/HR: 10000; 5 INJECTION INTRAVENOUS at 09:04

## 2020-01-01 RX ADMIN — ALBUTEROL SULFATE 2 PUFF: 90 AEROSOL, METERED RESPIRATORY (INHALATION) at 06:05

## 2020-01-01 RX ADMIN — PANTOPRAZOLE SODIUM 40 MG: 40 INJECTION, POWDER, LYOPHILIZED, FOR SOLUTION INTRAVENOUS at 08:05

## 2020-01-01 RX ADMIN — ACETAMINOPHEN 650 MG: 325 TABLET ORAL at 01:04

## 2020-01-01 RX ADMIN — PROCHLORPERAZINE EDISYLATE 5 MG: 5 INJECTION INTRAMUSCULAR; INTRAVENOUS at 02:02

## 2020-01-01 RX ADMIN — ACETAMINOPHEN 650 MG: 325 TABLET ORAL at 02:04

## 2020-01-01 RX ADMIN — BISACODYL 10 MG: 10 SUPPOSITORY RECTAL at 02:02

## 2020-01-01 RX ADMIN — ACETAMINOPHEN 650 MG: 325 TABLET ORAL at 09:05

## 2020-01-01 RX ADMIN — IOHEXOL 100 ML: 350 INJECTION, SOLUTION INTRAVENOUS at 07:04

## 2020-01-01 RX ADMIN — METOCLOPRAMIDE HYDROCHLORIDE 5 MG: 5 TABLET ORAL at 06:04

## 2020-01-01 RX ADMIN — Medication 2 G: at 03:01

## 2020-01-01 RX ADMIN — POTASSIUM & SODIUM PHOSPHATES POWDER PACK 280-160-250 MG 1 PACKET: 280-160-250 PACK at 05:05

## 2020-01-01 RX ADMIN — SEVELAMER CARBONATE 1.6 G: 800 POWDER, FOR SUSPENSION ORAL at 04:02

## 2020-01-01 RX ADMIN — MIDODRINE HYDROCHLORIDE 5 MG: 5 TABLET ORAL at 07:02

## 2020-01-01 RX ADMIN — FAMOTIDINE 20 MG: 40 POWDER, FOR SUSPENSION ORAL at 08:01

## 2020-01-01 RX ADMIN — GUAIFENESIN 200 MG: 200 SOLUTION ORAL at 05:08

## 2020-01-01 RX ADMIN — HEPARIN SODIUM 5000 UNITS: 5000 INJECTION, SOLUTION INTRAVENOUS; SUBCUTANEOUS at 08:02

## 2020-01-01 RX ADMIN — SEVELAMER CARBONATE 0.8 G: 800 POWDER, FOR SUSPENSION ORAL at 03:04

## 2020-01-01 RX ADMIN — DEXTROSE 125 ML: 10 SOLUTION INTRAVENOUS at 04:02

## 2020-01-01 RX ADMIN — INSULIN ASPART 2 UNITS: 100 INJECTION, SOLUTION INTRAVENOUS; SUBCUTANEOUS at 11:05

## 2020-01-01 RX ADMIN — PANTOPRAZOLE SODIUM 40 MG: 40 GRANULE, DELAYED RELEASE ORAL at 09:02

## 2020-01-01 RX ADMIN — METOCLOPRAMIDE HYDROCHLORIDE 5 MG: 5 TABLET ORAL at 04:05

## 2020-01-01 RX ADMIN — SENNOSIDES AND DOCUSATE SODIUM 1 TABLET: 8.6; 5 TABLET ORAL at 05:04

## 2020-01-01 RX ADMIN — ACYCLOVIR SODIUM 640 MG: 50 INJECTION, SOLUTION INTRAVENOUS at 04:01

## 2020-01-01 RX ADMIN — METOCLOPRAMIDE 5 MG: 5 TABLET ORAL at 12:08

## 2020-01-01 RX ADMIN — HEPARIN SODIUM 5000 UNITS: 5000 INJECTION, SOLUTION INTRAVENOUS; SUBCUTANEOUS at 11:02

## 2020-01-01 RX ADMIN — THIAMINE HYDROCHLORIDE 100 MG: 100 INJECTION, SOLUTION INTRAMUSCULAR; INTRAVENOUS at 01:01

## 2020-01-01 RX ADMIN — SEVELAMER CARBONATE 800 MG: 800 TABLET, FILM COATED ORAL at 06:03

## 2020-01-01 RX ADMIN — HEPARIN SODIUM AND DEXTROSE 15 UNITS/KG/HR: 10000; 5 INJECTION INTRAVENOUS at 03:04

## 2020-01-01 RX ADMIN — HEPARIN SODIUM AND DEXTROSE 15 UNITS/KG/HR: 10000; 5 INJECTION INTRAVENOUS at 11:04

## 2020-01-01 RX ADMIN — IOHEXOL 25 ML: 300 INJECTION, SOLUTION INTRAVENOUS at 10:02

## 2020-01-01 RX ADMIN — CHLORHEXIDINE GLUCONATE 0.12% ORAL RINSE 15 ML: 1.2 LIQUID ORAL at 12:01

## 2020-01-01 RX ADMIN — SEVELAMER CARBONATE 0.8 G: 800 POWDER, FOR SUSPENSION ORAL at 02:04

## 2020-01-01 RX ADMIN — METOCLOPRAMIDE 10 MG: 10 TABLET ORAL at 07:04

## 2020-01-01 RX ADMIN — FAMOTIDINE 20 MG: 40 POWDER, FOR SUSPENSION ORAL at 12:01

## 2020-01-01 RX ADMIN — ATORVASTATIN CALCIUM 40 MG: 20 TABLET, FILM COATED ORAL at 11:04

## 2020-01-01 RX ADMIN — SENNOSIDES 2 TABLET: 8.6 TABLET, FILM COATED ORAL at 11:04

## 2020-01-01 RX ADMIN — GUAIFENESIN 200 MG: 200 SOLUTION ORAL at 03:08

## 2020-01-01 RX ADMIN — Medication 6 MG: at 09:03

## 2020-01-01 RX ADMIN — INSULIN ASPART 3 UNITS: 100 INJECTION, SOLUTION INTRAVENOUS; SUBCUTANEOUS at 04:05

## 2020-01-01 RX ADMIN — DOXYCYCLINE HYCLATE 100 MG: 100 TABLET, COATED ORAL at 01:08

## 2020-01-01 RX ADMIN — MIDODRINE HYDROCHLORIDE 5 MG: 5 TABLET ORAL at 11:04

## 2020-01-01 RX ADMIN — SODIUM CHLORIDE 500 ML: 0.9 INJECTION, SOLUTION INTRAVENOUS at 07:02

## 2020-01-01 RX ADMIN — Medication 6 MG: at 09:04

## 2020-01-01 RX ADMIN — ACYCLOVIR SODIUM 320 MG: 50 INJECTION, SOLUTION INTRAVENOUS at 06:01

## 2020-01-01 RX ADMIN — MIDODRINE HYDROCHLORIDE 5 MG: 5 TABLET ORAL at 10:08

## 2020-01-01 RX ADMIN — ONDANSETRON 4 MG: 2 INJECTION INTRAMUSCULAR; INTRAVENOUS at 09:02

## 2020-01-01 RX ADMIN — PANTOPRAZOLE SODIUM 40 MG: 40 TABLET, DELAYED RELEASE ORAL at 10:04

## 2020-01-01 RX ADMIN — INSULIN ASPART 4 UNITS: 100 INJECTION, SOLUTION INTRAVENOUS; SUBCUTANEOUS at 11:05

## 2020-01-01 RX ADMIN — PANTOPRAZOLE SODIUM 40 MG: 40 INJECTION, POWDER, LYOPHILIZED, FOR SOLUTION INTRAVENOUS at 02:04

## 2020-01-01 RX ADMIN — ACETAMINOPHEN 650 MG: 325 TABLET ORAL at 04:03

## 2020-01-01 RX ADMIN — INSULIN ASPART 4 UNITS: 100 INJECTION, SOLUTION INTRAVENOUS; SUBCUTANEOUS at 12:04

## 2020-01-01 RX ADMIN — EPOETIN ALFA-EPBX 4900 UNITS: 10000 INJECTION, SOLUTION INTRAVENOUS; SUBCUTANEOUS at 05:08

## 2020-01-01 RX ADMIN — BISACODYL 10 MG: 10 SUPPOSITORY RECTAL at 06:04

## 2020-01-01 RX ADMIN — CARVEDILOL 3.12 MG: 3.12 TABLET, FILM COATED ORAL at 06:01

## 2020-01-01 RX ADMIN — SEVELAMER CARBONATE 1.6 G: 800 POWDER, FOR SUSPENSION ORAL at 10:01

## 2020-01-01 RX ADMIN — HYDRALAZINE HYDROCHLORIDE 5 MG: 20 INJECTION INTRAMUSCULAR; INTRAVENOUS at 06:01

## 2020-01-01 RX ADMIN — DOXYCYCLINE HYCLATE 100 MG: 100 TABLET, COATED ORAL at 10:04

## 2020-01-01 RX ADMIN — Medication 6 MG: at 10:05

## 2020-01-01 RX ADMIN — SENNOSIDES AND DOCUSATE SODIUM 1 TABLET: 8.6; 5 TABLET ORAL at 02:04

## 2020-01-01 RX ADMIN — ALBUTEROL SULFATE 2 PUFF: 90 AEROSOL, METERED RESPIRATORY (INHALATION) at 01:05

## 2020-01-01 RX ADMIN — ACETAMINOPHEN 650 MG: 325 TABLET ORAL at 05:02

## 2020-01-01 RX ADMIN — METOCLOPRAMIDE 10 MG: 10 TABLET ORAL at 03:04

## 2020-01-01 RX ADMIN — PANTOPRAZOLE SODIUM 40 MG: 40 GRANULE, DELAYED RELEASE ORAL at 10:05

## 2020-01-01 RX ADMIN — ACETAMINOPHEN 650 MG: 325 TABLET ORAL at 07:04

## 2020-01-01 RX ADMIN — MIDODRINE HYDROCHLORIDE 5 MG: 5 TABLET ORAL at 04:04

## 2020-01-01 RX ADMIN — ALBUTEROL SULFATE 2 PUFF: 90 AEROSOL, METERED RESPIRATORY (INHALATION) at 11:05

## 2020-01-01 RX ADMIN — SEVELAMER CARBONATE 800 MG: 800 TABLET, FILM COATED ORAL at 09:03

## 2020-01-01 RX ADMIN — SODIUM CHLORIDE 500 ML: 0.9 INJECTION, SOLUTION INTRAVENOUS at 10:08

## 2020-01-01 RX ADMIN — HYDROCODONE BITARTRATE AND ACETAMINOPHEN 1 TABLET: 5; 325 TABLET ORAL at 05:08

## 2020-01-01 RX ADMIN — Medication 10 ML: at 08:01

## 2020-01-01 RX ADMIN — CARVEDILOL 3.12 MG: 3.12 TABLET, FILM COATED ORAL at 12:01

## 2020-01-01 RX ADMIN — PANTOPRAZOLE SODIUM 40 MG: 40 GRANULE, DELAYED RELEASE ORAL at 10:02

## 2020-01-01 RX ADMIN — IOHEXOL 75 ML: 350 INJECTION, SOLUTION INTRAVENOUS at 11:02

## 2020-01-01 RX ADMIN — LEVETIRACETAM 250 MG: 100 SOLUTION ORAL at 08:03

## 2020-01-01 RX ADMIN — VANCOMYCIN HYDROCHLORIDE 1250 MG: 1.25 INJECTION, POWDER, LYOPHILIZED, FOR SOLUTION INTRAVENOUS at 06:04

## 2020-01-01 RX ADMIN — GLUCAGON HYDROCHLORIDE 1 MG: KIT at 09:02

## 2020-01-01 RX ADMIN — FENTANYL CITRATE 50 MCG: 50 INJECTION, SOLUTION INTRAMUSCULAR; INTRAVENOUS at 11:02

## 2020-01-01 RX ADMIN — PIPERACILLIN SODIUM AND TAZOBACTAM SODIUM 4.5 G: 4; .5 INJECTION, POWDER, LYOPHILIZED, FOR SOLUTION INTRAVENOUS at 09:08

## 2020-01-01 RX ADMIN — LEVETIRACETAM 250 MG: 100 INJECTION, SOLUTION, CONCENTRATE INTRAVENOUS at 11:02

## 2020-01-01 RX ADMIN — ONDANSETRON 8 MG: 8 TABLET, ORALLY DISINTEGRATING ORAL at 06:03

## 2020-01-01 RX ADMIN — ACETAMINOPHEN AND CODEINE PHOSPHATE 2.5 ML: 120; 12 SOLUTION ORAL at 04:02

## 2020-01-01 RX ADMIN — SODIUM CHLORIDE 250 ML: 0.9 INJECTION, SOLUTION INTRAVENOUS at 11:04

## 2020-01-01 RX ADMIN — POLYETHYLENE GLYCOL 3350 17 G: 17 POWDER, FOR SOLUTION ORAL at 04:02

## 2020-01-01 RX ADMIN — Medication 6 MG: at 10:03

## 2020-01-01 RX ADMIN — ATORVASTATIN CALCIUM 40 MG: 20 TABLET, FILM COATED ORAL at 10:03

## 2020-01-14 NOTE — TELEPHONE ENCOUNTER
----- Message from Rodri Sheets MD sent at 1/14/2020  6:57 AM CST -----  Good morning Ms. Torresle    Any luck scheduling his procedure?    If I am not mistaken he is at a nursing home who is coordinating this care. Their number is [facility] [phone].    YA  ----- Message -----  From: Rodri Sheets MD  Sent: 1/14/2020  To: Rodri Sheets MD    See if he gets scope on 1/13  Also has appointment with Alyson on 2/13 make sure it is still in chart

## 2020-01-14 NOTE — TELEPHONE ENCOUNTER
Contacted [facility] at [phone] and spoke to patient's nurse Sourav regarding scheduling EGD and colonoscopy.  She informed me that the facility physician, Dr Randall, discussed the procedures with the patient and he has refused to have them done.  Sourav told me that the patient stated that he is not going to drink the prep for the colonoscopy and doesn't want the EGD done either.  Orders canceled.

## 2020-01-20 PROBLEM — R41.82 ALTERED MENTAL STATUS: Status: ACTIVE | Noted: 2020-01-01

## 2020-01-20 PROBLEM — R56.9 SEIZURE: Status: ACTIVE | Noted: 2020-01-01

## 2020-01-20 NOTE — ASSESSMENT & PLAN NOTE
Altered mental status    Patient initially presented to ED prior to getting dialysis due to AMS and brown emesis. Had witnessed tonic clonic seizure requiring ativan shortly after getting a CT head. He was loaded with 1500 mg Keppra, intubated for airway protection, and started on propofol for sedation. Upon physical exam, patient remained unresponsive to verbal or tactile stimuli and had upward left sided gaze with sluggish pupils. Concern for status epilepticus. Differential diagnosis includes polysubstance, sepsis, intracranial abnormalities, and PRES.    - Spot EEG without evidence of current seizure. However patient already received keppra, ativan, and sedated on propofol. Ordered 24 hour EEG  - Substance induced: UDS negative, alcohol level wnl  - Sepsis: Concern for meningitis. Started vancomycin, rocephin, ampicillin, and acyclovir. Follow up blood cultures, sputum culture+gram stain (concern for aspiration), and procalcitonin. Initial lactate 2.4, likely due to seizure event. Will follow up repeat lactate. Patient received 500 ml saline in ED.   - Intracranial: patient with history of ICH with no functional deficits. Possibly multiple foci for seizure overtime. CT without acute changes, MRI brain without contrast ordered.  - PRES: Patient presented with hypertension, highest BP  214/120. Patient currently on cardene drip (goal BP sys of 160-180). Patient also sedated on propofol. Follow up MRI brain      Cancer Center Progress Note  Patient Name: Yamilet Delgado   YOB: 1952   Medical Record Number: [id number]   Attending Physician: Marciano Kennedy M.D.   [facility]  Co-Medical Director, [facility] Lisandra is a 71year old male with metastatic colorectal cancer, as above, who presents for follow up. He was hospitalized from 4/10/17 to 4/11/17 with multiple PE's demonstrated on a restaging CT. The CT also revealed progression of disease.  He has been restart tablet (10 mg total) by mouth every 6 (six) hours as needed for Nausea., Disp: 30 tablet, Rfl: 3  •  Ondansetron HCl (ZOFRAN) 8 MG tablet, Take 1 tablet (8 mg total) by mouth every 8 (eight) hours as needed for Nausea., Disp: 30 tablet, Rfl: 3    Allergies Integumentary Normal - No rashes, No Jaundice   Neurologic Normal - No sensory or motor deficits, normal cerebellar function, normal gait, cranial nerves intact. Psychiatric Normal - A&Ox3, Coherent speech.  Verbalizes understanding of our discussions t CEA cruz and CT in May revealed progression. Lonsurf was ordered at that time, but due to insurance delay, didn't arrive until July. He tolerated it well, but his CEA continued to rise. Restaging CT revealed progression.  He has started Vectibix and tole

## 2020-01-20 NOTE — CONSULTS
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 On evaluation by gastroenterology patient was intubated and sedated. On chart review patient presented from nursing home with altered mental status. Per nursing home report, patient was seen this AM to bring him to dialysis and was noted to have a 1/4 trash can full of cofee ground emesis, he was confused and lethargic therefore was brought to ER. He did not miss his recent dialysis on Friday. He was supposedly in his usual state of health the day prior and is able to ambulate unassisted and is always AAOx3.   Per nursing report, while in ER, patient cried out, was then noted to have an episode of emesis and started seizing. Patient was then intubated for airway protection. No known history of seizures.    Last EGD 11/12/2019 with LA Grade D esophagitis, small giatial hernia, diffuse mildly erythematous mucosa in the stomach, 1mm sessile gastric polyp with recommendations to use pantoprazole 40mg q12 hours and repeat upper endoscopy in 8 weeks to check healing.    PEndoHx:  10/2/19: EGD grade D esophagitis.   9/23/19 EGD Gr D esophagitis, medium hiatal hernia  3/7/19 EGD Gr D esophagitis  5/22/18 EGD LA Gr D esophagitis  3/16/18  EGD gastric polyp. Gr C esophagitis  4/4/17 Colonoscopy. Fair prep. 3mm transverse polyp, 8mm sigmoid polyp. F/u 3 years.    Past Medical History:   Diagnosis Date    Amputation stump pain 9/10/2013    Aspiration pneumonia 7/27/2015    Asterixis 11/8/2016    C. difficile colitis 8/7/2015    Cholelithiasis without obstruction 8/25/2015    Chronic diastolic heart failure     2-23-17   1 - Low normal to mildly depressed left ventricular systolic function (EF 50-55%).    2 - Right ventricular enlargement with mildly depressed systolic function.    3 - Left ventricular diastolic dysfunction.    4 - Right atrial enlargement.    5 - Severe tricuspid regurgitation.    6 - Pulmonary hypertension. The estimated PA systolic pressure is 86 mmHg.    7 - Increased central venous pressure.     Chronic low back pain 12/1/2015    Closed head injury 9/8/2016    DVT (deep venous thrombosis) 7/28/2017    ESRD on hemodialysis 2/7/2013    MWF at 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    Metabolic acidosis, IAG, reduced excretion of inorganic acids     Myoclonic jerking 9/20/2016    Noncompliance with medication regimen 12/4/2018    Secondary hyperparathyroidism (of renal origin)     Secondary pulmonary hypertension 3/23/2017    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    TB lung, latent 08/25/2015    Negative Quantiferon Gold 3-23-17       Past Surgical History:   Procedure Laterality Date    COLONOSCOPY      COLONOSCOPY N/A 4/4/2017    Procedure: COLONOSCOPY;  Surgeon: Walker Stern MD;   [facility]  Service: Endoscopy;  Laterality: N/A;  PA Systolic Pressure 85.56. HD Patient MWF, K+ lab prior to procedure.     ESOPHAGOGASTRODUODENOSCOPY N/A 6/12/2018    Procedure: EGD (ESOPHAGOGASTRODUODENOSCOPY);  Surgeon: Man Galicia MD;  [facility]  Service: Endoscopy;  Laterality: N/A;  EGD in 8-12 weeks with Dr. Galicia on 4th floor for follow up erosive esophagitis and Mejia's surveillance.    Patient should be on Pantoprazole 40mg every 12 hours or the equivulant of another PPI    awaiting for patient to reply back regarding changing    ESOPHAGOGASTRODUODENOSCOPY N/A 3/7/2019    Procedure: EGD (ESOPHAGOGASTRODUODENOSCOPY);  Surgeon: Man Galicia MD;  [facility]  Service: Endoscopy;  Laterality: N/A;    ESOPHAGOGASTRODUODENOSCOPY N/A 9/23/2019    Procedure: EGD (ESOPHAGOGASTRODUODENOSCOPY);  Surgeon: Keanu Rainey MD;  [facility]  Service: Endoscopy;  Laterality: N/A;    ESOPHAGOGASTRODUODENOSCOPY N/A 10/2/2019    Procedure: EGD (ESOPHAGOGASTRODUODENOSCOPY);  Surgeon: Kevin De La Paz MD;  [facility]  Service: Endoscopy;  Laterality: N/A;    ESOPHAGOGASTRODUODENOSCOPY N/A 11/12/2019    Procedure: EGD (ESOPHAGOGASTRODUODENOSCOPY);  Surgeon: Kevin De La Paz MD;  [facility]  Service: Endoscopy;  Laterality: N/A;    FOOT AMPUTATION THROUGH METATARSAL      left foot    LEG AMPUTATION THROUGH KNEE  12/18/2013    left BKA    R AVF  9/12/12    UPPER GASTROINTESTINAL ENDOSCOPY         Family History   Problem Relation Age of Onset    Early death Mother     Kidney disease Father     Hypertension Father     Heart disease Father     Hyperlipidemia Father     Diabetes Brother     Alcohol abuse Maternal Grandmother     Diabetes Maternal Grandfather     Hypertension Sister     Melanoma Neg Hx        Social History     Socioeconomic History    Marital status:       Spouse name: Not on file    Number of children: Not on file    Years of education: Not on file    Highest education level: Not on file   Occupational History    Not on file   Social Needs    Financial resource strain: Not on file    Food insecurity:     Worry: Not on file     Inability: Not on file    Transportation needs:     Medical: Not on file     Non-medical: Not on file   Tobacco Use    Smoking status: Former Smoker     Packs/day: 1.00     Years: 10.00     Pack years: 10.00    Smokeless tobacco: Never Used   Substance and Sexual Activity    Alcohol use: No    Drug use: No    Sexual activity: Yes     Partners: Female     Birth control/protection: None   Lifestyle    Physical activity:     Days per week: Not on file     Minutes per session: Not on file    Stress: Not on file   Relationships    Social connections:     Talks on phone: Not on file     Gets together: Not on file     Attends Mosque service: Not on file     Active member of club or organization: Not on file     Attends meetings of clubs or organizations: Not on file     Relationship status: Not on file   Other Topics Concern    Not on file   Social History Narrative    Not on file       No current facility-administered medications on file prior to encounter.      Current Outpatient Medications on File Prior to Encounter   Medication Sig Dispense Refill    acetaminophen (TYLENOL) 325 MG tablet Take 2 tablets (650 mg total) by mouth every 8 (eight) hours as needed.  0    carvedilol (COREG) 3.125 MG tablet Take 1 tablet (3.125 mg total) by mouth 2 (two) times daily with meals. 60 tablet 11    metoclopramide HCl (REGLAN) 10 MG tablet Take 1 tablet (10 mg total) by mouth 3 (three) times daily before meals. 90 tablet 1    midodrine (PROAMATINE) 5 MG Tab Please take 30 min before dialysis on Monday Wednesday and Friday 60 tablet 3    ondansetron (ZOFRAN) 8 MG tablet Take 1 tablet (8 mg total) by mouth every 12 (twelve) hours as  needed for Nausea. 60 tablet 1    pantoprazole (PROTONIX) 40 MG tablet Take 1 tablet (40 mg total) by mouth 2 (two) times daily. 60 tablet 11    promethazine (PHENERGAN) 25 MG tablet Take 1 tablet (25 mg total) by mouth every 6 (six) hours as needed for Nausea. 15 tablet 0    RENAPLEX-D 800 mcg-12.5 mg -2,000 unit Tab Take 1 tablet by mouth once daily.  3    sevelamer carbonate (RENVELA) 800 mg Tab Take 1 tablet (800 mg total) by mouth 3 (three) times daily with meals. 90 tablet 3    sucralfate (CARAFATE) 100 mg/mL suspension Take 10 mLs (1 g total) by mouth 4 (four) times daily before meals and nightly. 420 mL 2       Review of patient's allergies indicates:   Allergen Reactions    Fosrenol [lanthanum] Nausea And Vomiting     Nausea and vomiting       Review of Systems   Unable to perform ROS: Intubated   Gastrointestinal: Positive for vomiting.        Objective:     Vitals:    01/20/20 1337   BP: (!) 174/108   Pulse: (!) 112   Resp:    Temp:          Physical Exam   Constitutional: He appears well-developed.   Chronically ill appearing   HENT:   Head: Normocephalic and atraumatic.   Eyes: Conjunctivae are normal. No scleral icterus.   Neck: Normal range of motion. Neck supple.   Cardiovascular: Tachycardia present.   Pulmonary/Chest:   Intubated  Mechanical breath sounds   Abdominal: Soft. Bowel sounds are normal.   NG tube in place, refer below for contents   Musculoskeletal:   L BKA  AVF in RUE   Neurological:   Sedated  RASS -5   Skin: Skin is warm and dry.   Vitals reviewed.             Significant Labs:  Recent Labs   Lab 01/20/20  0832   HGB 15.0       Lab Results   Component Value Date    WBC 7.51 01/20/2020    HGB 15.0 01/20/2020    HCT 50 01/20/2020    MCV 90 01/20/2020     01/20/2020       Lab Results   Component Value Date     (H) 01/20/2020    K 4.9 01/20/2020    CL 85 (L) 01/20/2020    CO2 36 (H) 01/20/2020    BUN 58 (H) 01/20/2020    CREATININE 10.8 (H) 01/20/2020    CALCIUM 10.1  01/20/2020    ANIONGAP 26 (H) 01/20/2020    ESTGFRAFRICA 5.5 (A) 01/20/2020    EGFRNONAA 4.8 (A) 01/20/2020       Lab Results   Component Value Date    ALT 12 01/20/2020    AST 22 01/20/2020    ALKPHOS 333 (H) 01/20/2020    BILITOT 0.5 01/20/2020       Lab Results   Component Value Date    INR 1.2 01/20/2020    INR 1.0 11/22/2019    INR 1.1 11/11/2019       Significant Imaging:  Reviewed pertinent radiology findings.       Assessment/Plan:     Vaughn Retana is a 54 y.o. 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 Patient subsequently had seizure in ER and was intubated for airway protection. Gastroenterology consulted due to concern for UGI bleed.    On presentation, patient is hemodynamically stable, Hgb 15, BUN not interpretable in setting of ESRD. Plts wnl, INR 1.2  NG tube suctioning dark material likely small amount of blood and bile, but not unexpected in setting of gastroparesis.    Problem List:  1. Coffee Ground Emesis  2. Esophagitis  3. History of Gastroperesis per emptying study imaging on 09/5/19    Plan:  1.   - Keep patient NPO  - Place 2 large bore IVs, volume resuscitation per primary  - Transfuse pRBC for Hb < 7 g/dL (Consider a higher Hb target if there is clinical evidence of intravascular volume depletion or comorbidities, such as CAD or if high suspicion of vigorous active ongoing bleeding or an uncorrected coagulopathy exists.).  - Correct coagulopathy (goal plt >50, INR <1.5) if present in patients without absolute contraindications.  - IV PPI 40 BID, switch to PO PPI 40mg BID once extubated and if tolerating PO  - Avoid nonsteroidal agents, antiplatelet agents and anticoagulants if possible in patients without absolute contraindications  - Please call with any additional questions, concerns or changes in the patient's  clinical status.  - Correct fluid and electrolyte imbalances and nutritional deficiencies  - Continue to monitor  - No urgent indication for endoscopy at this time  2. Refer above  3.   - Once extubated and tolerating PO:  Dietary management: 1. eating small, low-fat, low-fiber meals 4-5 times/day 2. copious non carbonated fluid intake 3. May need referral to a dietitian  - Consider metoclopramide usual starting dose is 5 mg TID (30 minutes prior to meals and add at bedtime PRN), titrated up as tolerated to maximum of 40 mg/day, with addition of evening dose as indicated, Other prokinetic agents may include erythromycin.   - Can use other antiemetic medications such as phenothiazines, antihistamines, or Zofran alone or in conjunction with prokinetic agents.   (Metoclopramide is FDA-approved for gastroparesis but has black box warning about risk for tardive dyskinesia.)  High-quality evidence per [employer]    Thank you for involving us in the care of Vaughn Retana. Please call with any additional questions, concerns or changes in the patient's clinical status.    Homer Cleary MD PGY-II  Gastroenterology  [facility]

## 2020-01-20 NOTE — PROGRESS NOTES
Pharmacokinetic Initial Assessment: IV Vancomycin    Assessment/Plan:    Initiate intravenous vancomycin with loading dose of 1500 mg once with subsequent doses when random concentrations are less than 20 mcg/mL  Desired empiric serum trough concentration is 10 to 20 mcg/mL  Draw vancomycin random level with AM labs on 01/21/2020.  Pharmacy will continue to follow and monitor vancomycin.      Please contact pharmacy at extension [phone] with any questions regarding this assessment.     Thank you for the consult,   Edil Elkins       Patient brief summary:  Vaughn Retana is a 54 y.o. male initiated on antimicrobial therapy with IV Vancomycin for treatment of suspected meningitis    Drug Allergies:   Review of patient's allergies indicates:   Allergen Reactions    Fosrenol [lanthanum] Nausea And Vomiting     Nausea and vomiting       Actual Body Weight:   72.6    Renal Function:   Estimated Creatinine Clearance: 7 mL/min (A) (based on SCr of 10.8 mg/dL (H)).    Dialysis Method (if applicable):  intermittent HD, home schedule MWF    CBC (last 72 hours):  Recent Labs   Lab Result Units 01/20/20  0832   WBC K/uL 7.51   Hemoglobin g/dL 15.0   Hematocrit % 47.3   Platelets K/uL 172   Gran% % 83.5*   Lymph% % 10.1*   Mono% % 5.5   Eosinophil% % 0.4   Basophil% % 0.1   Differential Method  Automated       Metabolic Panel (last 72 hours):  Recent Labs   Lab Result Units 01/20/20  0832 01/20/20  0908   Sodium mmol/L 147*  --    Potassium mmol/L 4.9  --    Chloride mmol/L 85*  --    CO2 mmol/L 36*  --    Glucose mg/dL 135*  --    Glucose, UA   --  Negative   BUN, Bld mg/dL 58*  --    Creatinine mg/dL 10.8*  --    Creatinine, Random Ur mg/dL  --  56.0   Albumin g/dL 3.7  --    Total Bilirubin mg/dL 0.5  --    Alkaline Phosphatase U/L 333*  --    AST U/L 22  --    ALT U/L 12  --        Drug levels (last 3 results):  No results for input(s): VANCOMYCINRA, VANCOMYCINPE, VANCOMYCINTR in the last 72 hours.    Microbiologic  Results:  Microbiology Results (last 7 days)     Procedure Component Value Units Date/Time    Influenza A & B by Molecular [[id number]] Collected:  01/20/20 0840    Order Status:  Completed Specimen:  Nasopharyngeal Swab Updated:  01/20/20 1017     Influenza A, Molecular Negative     Influenza B, Molecular Negative     Flu A & B Source NP    Blood Culture #1 **CANNOT BE ORDERED STAT** [[id number]] Collected:  01/20/20 0832    Order Status:  Sent Specimen:  Blood from Peripheral, Antecubital, Left Updated:  01/20/20 0850    Blood Culture #2 **CANNOT BE ORDERED STAT** [[id number]] Collected:  01/20/20 0840    Order Status:  Sent Specimen:  Blood from Peripheral, Wrist, Left Updated:  01/20/20 0850

## 2020-01-20 NOTE — CONSULTS
Patient seen and assessed by critical care at bedside. Patient will be intubated out of concern for active seizures; physical exam concerning. Will admit to MICU, full consult note to follow.

## 2020-01-20 NOTE — PROCEDURES
"Vaughn Retana is a 54 y.o. male patient.    Temp: 98.6 °F (37 °C) (01/20/20 0805)  Pulse: (!) 115 (01/20/20 1327)  Resp: 18 (01/20/20 0805)  BP: (!) 214/120 (01/20/20 1327)  SpO2: 99 % (01/20/20 1327)  Weight: 72.6 kg (160 lb) (01/20/20 1313)       Intubation  Date/Time: 1/20/2020 1:38 PM  Location procedure was performed: [facility] EMERGENCY DEPARTMENT  Performed by: Vania Payan DO  Authorized by: Vania Payan DO   Assisting provider: Venu Curiel MD  Pre-operative diagnosis: seizure  Post-operative diagnosis: seizure  Consent Done: Emergent Situation  Time out: Immediately prior to procedure a "time out" was called to verify the correct patient, procedure, equipment, support staff and site/side marked as required.  Indications: airway protection  Intubation method: video-assisted  Patient status: paralyzed (RSI)  Preoxygenation: nonrebreather mask  Sedatives: etomidate (20mg)  Paralytic: rocuronium (80mg)  Laryngoscope size: S4.  Tube size: 7.5 mm  Tube type: cuffed  Number of attempts: 1  Cricoid pressure: no  Cords visualized: yes  Post-procedure assessment: chest rise and CO2 detector  Breath sounds: equal and absent over the epigastrium  Cuff inflated: yes  ETT to lip: 25 cm  ETT to teeth: 25 cm  Tube secured with: ETT adler  Patient tolerance: Patient tolerated the procedure well with no immediate complications  Complications: No  Estimated blood loss (mL): 0  Specimens: No  Implants: Yes  Comments: Dr. Curiel directly supervised the entire procedure. CXR pending.          Vania Payan  1/20/2020  "

## 2020-01-20 NOTE — ED NOTES
Patient resting with eyes closed, no acute distress noted, cardiac monitoring in progress, vss, will continue to monitor

## 2020-01-20 NOTE — CARE UPDATE
Rapid Response Nurse AI Alert     AI alert received, chart check completed abnormal VS noted. This patient is being followed by and admitted to critical care services. Call [phone] for further concerns or assistance.

## 2020-01-20 NOTE — SUBJECTIVE & OBJECTIVE
Past Medical History:   Diagnosis Date    Amputation stump pain 9/10/2013    Aspiration pneumonia 7/27/2015    Asterixis 11/8/2016    C. difficile colitis 8/7/2015    Cholelithiasis without obstruction 8/25/2015    Chronic diastolic heart failure     2-23-17   1 - Low normal to mildly depressed left ventricular systolic function (EF 50-55%).    2 - Right ventricular enlargement with mildly depressed systolic function.    3 - Left ventricular diastolic dysfunction.    4 - Right atrial enlargement.    5 - Severe tricuspid regurgitation.    6 - Pulmonary hypertension. The estimated PA systolic pressure is 86 mmHg.    7 - Increased central venous pressure.    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    Metabolic acidosis, IAG, reduced excretion of inorganic acids     Myoclonic jerking 9/20/2016    Noncompliance with medication regimen 12/4/2018    Secondary hyperparathyroidism (of renal origin)     Secondary pulmonary hypertension 3/23/2017    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    TB lung, latent 08/25/2015    Negative Quantiferon Gold 3-23-17       Past Surgical History:   Procedure Laterality Date    COLONOSCOPY      COLONOSCOPY N/A 4/4/2017    Procedure: COLONOSCOPY;  Surgeon: Walker Stern MD;  Location: [facility] ([address] [place] [address]);  Service: Endoscopy;  Laterality: N/A;  PA Systolic Pressure 85.56. HD Patient MWF, K+ lab  prior to procedure.     ESOPHAGOGASTRODUODENOSCOPY N/A 6/12/2018    Procedure: EGD (ESOPHAGOGASTRODUODENOSCOPY);  Surgeon: Man Galicia MD;  Location: [facility] (2ND FLR);  Service: Endoscopy;  Laterality: N/A;  EGD in 8-12 weeks with Dr. Galicia on 4th floor for follow up erosive esophagitis and Mejia's surveillance.    Patient should be on Pantoprazole 40mg every 12 hours or the equivulant of another PPI    awaiting for patient to reply back regarding changing    ESOPHAGOGASTRODUODENOSCOPY N/A 3/7/2019    Procedure: EGD (ESOPHAGOGASTRODUODENOSCOPY);  Surgeon: Man Galicia MD;  Location: [facility] (2ND FLR);  Service: Endoscopy;  Laterality: N/A;    ESOPHAGOGASTRODUODENOSCOPY N/A 9/23/2019    Procedure: EGD (ESOPHAGOGASTRODUODENOSCOPY);  Surgeon: Keanu Rainey MD;  Location: [facility] (2ND FLR);  Service: Endoscopy;  Laterality: N/A;    ESOPHAGOGASTRODUODENOSCOPY N/A 10/2/2019    Procedure: EGD (ESOPHAGOGASTRODUODENOSCOPY);  Surgeon: Kevin De La Paz MD;  Location: [facility] (2ND FLR);  Service: Endoscopy;  Laterality: N/A;    ESOPHAGOGASTRODUODENOSCOPY N/A 11/12/2019    Procedure: EGD (ESOPHAGOGASTRODUODENOSCOPY);  Surgeon: Kevin De La Paz MD;  [facility]  Service: Endoscopy;  Laterality: N/A;    FOOT AMPUTATION THROUGH METATARSAL      left foot    LEG AMPUTATION THROUGH KNEE  12/18/2013    left BKA    R AVF  9/12/12    UPPER GASTROINTESTINAL ENDOSCOPY         Review of patient's allergies indicates:   Allergen Reactions    Fosrenol [lanthanum] Nausea And Vomiting     Nausea and vomiting       Family History     Problem Relation (Age of Onset)    Alcohol abuse Maternal Grandmother    Diabetes Brother, Maternal Grandfather    Early death Mother    Heart disease Father    Hyperlipidemia Father    Hypertension Father, Sister    Kidney disease Father        Tobacco Use    Smoking status: Former Smoker     Packs/day: 1.00     Years: 10.00     Pack years: 10.00    Smokeless  tobacco: Never Used   Substance and Sexual Activity    Alcohol use: No    Drug use: No    Sexual activity: Yes     Partners: Female     Birth control/protection: None      Review of Systems   Unable to perform ROS: Intubated     Objective:     Vital Signs (Most Recent):  Temp: 98.6 °F (37 °C) (01/20/20 0805)  Pulse: (!) 120(Simultaneous filing. User may not have seen previous data.) (01/20/20 1711)  Resp: 18 (01/20/20 0805)  BP: 112/66(Simultaneous filing. User may not have seen previous data.) (01/20/20 1711)  SpO2: 100 %(Simultaneous filing. User may not have seen previous data.) (01/20/20 1711) Vital Signs (24h Range):  Temp:  [98.6 °F (37 °C)] 98.6 °F (37 °C)  Pulse:  [] 120  Resp:  [18] 18  SpO2:  [94 %-100 %] 100 %  BP: (112-217)/() 112/66   Weight: 72.6 kg (160 lb)  Body mass index is 25.82 kg/m².      Intake/Output Summary (Last 24 hours) at 1/20/2020 1726  Last data filed at 1/20/2020 1536  Gross per 24 hour   Intake 650 ml   Output --   Net 650 ml       Physical Exam   Constitutional: He appears well-developed and well-nourished.   HENT:   Head: Normocephalic and atraumatic.   Mouth/Throat: No oropharyngeal exudate.   Eyes: Conjunctivae are normal.   Pupils sluggish, upper left gaze   Neck: Normal range of motion. Neck supple. No JVD present.   Cardiovascular: Regular rhythm and normal heart sounds.   No murmur heard.  tachycardic   Pulmonary/Chest: He has rales (bilateral lung bases ).   Intubated   Abdominal: Soft. Bowel sounds are normal. He exhibits no distension.   Musculoskeletal: Normal range of motion. He exhibits edema.   Left below knee amputation   Neurological:   Patient not responsive to physical stimuli. Left upward gaze, does not track or follow commands.    Skin: Skin is warm and dry.   Vitals reviewed.      Vents:  Vent Mode: A/C (01/20/20 1711)  Ventilator Initiated: Yes (01/20/20 1327)  Set Rate: 14 bmp (01/20/20 1711)  Vt Set: 480 mL (01/20/20 1711)  Pressure Support: 0  cmH20 (01/20/20 1711)  PEEP/CPAP: 5 cmH20 (01/20/20 1711)  Oxygen Concentration (%): 30 (01/20/20 1711)  Peak Airway Pressure: 23 cmH2O (01/20/20 1711)  Plateau Pressure: 0 cmH20 (01/20/20 1711)  Total Ve: 7.16 mL (01/20/20 1711)  Lines/Drains/Airways     Drain                 Hemodialysis AV Fistula Right upper arm -- days         Hemodialysis AV Fistula Right upper arm -- days          Airway                 Airway - Non-Surgical 01/20/20 1325 Endotracheal Tube-Hi/Lo less than 1 day          Peripheral Intravenous Line                 Peripheral IV - Single Lumen 01/20/20 0831 20 G Left Antecubital less than 1 day         Peripheral IV - Single Lumen 01/20/20 0838 22 G Left Wrist less than 1 day         Peripheral IV - Single Lumen 01/20/20 1320 20 G Left Other less than 1 day              Significant Labs:    CBC/Anemia Profile:  Recent Labs   Lab 01/20/20  0832 01/20/20  0837 01/20/20  1505   WBC 7.51  --  11.26   HGB 15.0  --  15.3   HCT 47.3 50 46.1     --  197   MCV 90  --  87   RDW 16.6*  --  16.1*        Chemistries:  Recent Labs   Lab 01/20/20  0832   *   K 4.9   CL 85*   CO2 36*   BUN 58*   CREATININE 10.8*   CALCIUM 10.1   ALBUMIN 3.7   PROT 9.6*   BILITOT 0.5   ALKPHOS 333*   ALT 12   AST 22       Coagulation:   Recent Labs   Lab 01/20/20  0832   INR 1.2     Lactic Acid:   Recent Labs   Lab 01/20/20  0832   LACTATE 2.4*     Troponin:   Recent Labs   Lab 01/20/20  0832 01/20/20  1505   TROPONINI 0.348* 0.339*     Urine Studies:   Recent Labs   Lab 01/20/20  0908   COLORU Yellow   APPEARANCEUA Hazy*   PHUR 8.0   SPECGRAV 1.015   PROTEINUA 2+*   GLUCUA Negative   KETONESU Negative   BILIRUBINUA Negative   OCCULTUA 3+*   NITRITE Negative   LEUKOCYTESUR 1+*   RBCUA 65*   WBCUA 4   BACTERIA Few*   HYALINECASTS 0       Significant Imaging: I have reviewed all pertinent imaging results/findings within the past 24 hours.

## 2020-01-20 NOTE — HPI
"Patient is a 54 year old male with extensive medical history including ESRD on dialysis MWF (has not received it today), L below knee amputation 2/2 osteomyelitis, dCHF (last echo 8/2/19 [facility] [place]), GERD, h/o multiple ICH w/o residual deficits, and multiple instances of GI bleed (last EGD 11/12/19, grade D esophagitis) who presents to ED [facility] due to altered mental status. From NH report, nursing home staff went to wake the patient for his morning dialysis. He was confused, lethargic, had a moderate amount of brown colored emesis in his trash can. Patient last got dialysis on Friday. Patient is normally able to ambulate on his own and is alert and oriented; nursing staff reports that he appeared normal day before admission. The patient was admitted to critical care with concerns of new onset seizures and was intubated for airway protection. Patient was started on vanc, rocephin, ampicillin, and acyclovir to cover for meningitis. Patient had spot EEG while on propofol in ED which did not show seizure activity. MRI done showed chronic changes but no acute abnormalities. Lumbar puncture was done after MRI. CSF labs were not convincing for bacterial or viral meningitis and antibiotics were de-escalated. Continuous EEG was done after propofol was stopped which showed epileptiform discharges and the patient was continued on keppra with neurology assisting with management. Nephrology was consulted for HD management. Pt became febrile the night of 01/22; blood cultures repeated and restarted on Vanc and Zosyn. HSV PCR neg so Acyclovir discontinued. Repeat 24 hr EEG shows "multifocal slowing consistent with multiple areas of focal cortical dysfunction and occasional epileptiform discharges in the left frontotemporal region consistent with a potential seizure focus in this area" with no electrographic seizures. On 1/29 the patient developed BRBPR; GI consulted, no scope indicated at that time. The patient was " extubated on 1/30. Repeat EEG was performed on 2/1 which showed no seizure activity. The patient continued to follow swallow studies and had a PEG tube placed on 2/12.     On 2/13 the patient had multiple episodes of coffee ground emesis. Critical Care Medicine was consulted for further management.

## 2020-01-20 NOTE — ED PROVIDER NOTES
Encounter Date: 1/20/2020       History     Chief Complaint   Patient presents with    Altered Mental Status     Pt here from [place] with lethargy, pt answering all questions yes.  Nurse reports pt was seen walking yesterday. Hx of aphasia.     Emesis     nausea and vomiting, coffee ground emesis per report. Last dialysis Friday.      54-year-old  male with history of hypertension, CHF, ESRD on HD MWF, GERD, recurrent GI bleed presents to the ED via EMS from [facility] for altered mental status.  Per NH, they went to wake the patient up this morning for dialysis, he was noted to have 1/4 of his trash can filled with emesis, he was confused and lethargic.  Last dialysis was on Friday.  Normally, he ambulates unassisted, is alert and oriented. His last known normal is unclear, nurse reports that she saw him yesterday walking around so thinks that he was normal then.    The history is provided by the EMS personnel and the nursing home. The history is limited by the condition of the patient (altered mental status).     Review of patient's allergies indicates:   Allergen Reactions    Fosrenol [lanthanum] Nausea And Vomiting     Nausea and vomiting     Past Medical History:   Diagnosis Date    Amputation stump pain 9/10/2013    Aspiration pneumonia 7/27/2015    Asterixis 11/8/2016    C. difficile colitis 8/7/2015    Cholelithiasis without obstruction 8/25/2015    Chronic diastolic heart failure     2-23-17   1 - Low normal to mildly depressed left ventricular systolic function (EF 50-55%).    2 - Right ventricular enlargement with mildly depressed systolic function.    3 - Left ventricular diastolic dysfunction.    4 - Right atrial enlargement.    5 - Severe tricuspid regurgitation.    6 - Pulmonary hypertension. The estimated PA systolic pressure is 86 mmHg.    7 - Increased central venous pressure.    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    Metabolic acidosis, IAG, reduced excretion of inorganic acids     Myoclonic jerking 9/20/2016    Noncompliance with medication regimen 12/4/2018    Secondary hyperparathyroidism (of renal origin)     Secondary pulmonary hypertension 3/23/2017    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    TB lung, latent 08/25/2015    Negative Quantiferon Gold 3-23-17     Past Surgical History:   Procedure Laterality Date    COLONOSCOPY      COLONOSCOPY N/A 4/4/2017    Procedure: COLONOSCOPY;  Surgeon: Walker Stern MD;  Location: [facility] ([address] [place] [address]);  Service: Endoscopy;  Laterality: N/A;  PA Systolic Pressure 85.56. HD Patient MWF, K+ lab prior to procedure.     ESOPHAGOGASTRODUODENOSCOPY N/A 6/12/2018    Procedure: EGD (ESOPHAGOGASTRODUODENOSCOPY);  Surgeon: Man Galicia MD;  Location: [facility] ([address] [place] [address]);  Service: Endoscopy;  Laterality: N/A;  EGD in 8-12 weeks with Dr. Galicia on 4th floor for follow up erosive esophagitis and Mejia's surveillance.    Patient should be on Pantoprazole 40mg every 12 hours or the equivulant of another PPI    awaiting for patient to reply back regarding changing    ESOPHAGOGASTRODUODENOSCOPY N/A 3/7/2019    Procedure: EGD (ESOPHAGOGASTRODUODENOSCOPY);  Surgeon: Man Galicia MD;  Location: [facility] ([address] [place] [address]);  Service: Endoscopy;  Laterality: N/A;    ESOPHAGOGASTRODUODENOSCOPY N/A 9/23/2019    Procedure: EGD  (ESOPHAGOGASTRODUODENOSCOPY);  Surgeon: Keanu Rainey MD;  Location: [facility] ENDO (2ND FLR);  Service: Endoscopy;  Laterality: N/A;    ESOPHAGOGASTRODUODENOSCOPY N/A 10/2/2019    Procedure: EGD (ESOPHAGOGASTRODUODENOSCOPY);  Surgeon: Kevin De La Paz MD;  Location: [facility] (2ND FLR);  Service: Endoscopy;  Laterality: N/A;    ESOPHAGOGASTRODUODENOSCOPY N/A 11/12/2019    Procedure: EGD (ESOPHAGOGASTRODUODENOSCOPY);  Surgeon: Kevin De La Paz MD;  Location: [facility] (2ND FLR);  Service: Endoscopy;  Laterality: N/A;    FOOT AMPUTATION THROUGH METATARSAL      left foot    LEG AMPUTATION THROUGH KNEE  12/18/2013    left BKA    R AVF  9/12/12    UPPER GASTROINTESTINAL ENDOSCOPY       Family History   Problem Relation Age of Onset    Early death Mother     Kidney disease Father     Hypertension Father     Heart disease Father     Hyperlipidemia Father     Diabetes Brother     Alcohol abuse Maternal Grandmother     Diabetes Maternal Grandfather     Hypertension Sister     Melanoma Neg Hx      Social History     Tobacco Use    Smoking status: Former Smoker     Packs/day: 1.00     Years: 10.00     Pack years: 10.00    Smokeless tobacco: Never Used   Substance Use Topics    Alcohol use: No    Drug use: No     Review of Systems   Unable to perform ROS: Mental status change   Gastrointestinal: Positive for vomiting.   Psychiatric/Behavioral: Positive for confusion.       Physical Exam     Initial Vitals [01/20/20 0805]   BP Pulse Resp Temp SpO2   (!) 178/92 80 18 98.6 °F (37 °C) 96 %      MAP       --         Physical Exam    Nursing note and vitals reviewed.  Constitutional: He appears well-developed and well-nourished. He is not diaphoretic. No distress.   Appears older than stated age   HENT:   Head: Normocephalic and atraumatic.   Eyes: Pupils are equal, round, and reactive to light.   Neck: Normal range of motion. Neck supple.   Cardiovascular: Normal rate, regular rhythm and normal heart sounds.  Exam reveals no gallop and no friction rub.    No murmur heard.  Pulmonary/Chest: Breath sounds normal. He has no wheezes. He has no rhonchi. He has no rales.   Abdominal: Soft. Bowel sounds are normal. There is no tenderness. There is no rebound and no guarding.   Genitourinary:   Genitourinary Comments: Brown heme positive stool on ALAN   Musculoskeletal: Normal range of motion.   AVF noted to the RUE. L BKA.   Neurological:   Awake. Confused, follows very simple commands, no verbal response.   Skin: Skin is warm and dry. No rash noted. No erythema.         ED Course   Critical Care  Date/Time: 1/20/2020 12:15 PM  Performed by: Venu Curiel MD  Authorized by: Venu Curiel MD   Direct patient critical care time: 20 minutes  Additional history critical care time: 5 minutes  Ordering / reviewing critical care time: 15 minutes  Documentation critical care time: 5 minutes  Consulting other physicians critical care time: 10 minutes  Total critical care time (exclusive of procedural time) : 55 minutes  Critical care was necessary to treat or prevent imminent or life-threatening deterioration of the following conditions: CNS failure or compromise and metabolic crisis.  Critical care was time spent personally by me on the following activities: development of treatment plan with patient or surrogate, discussions with consultants, interpretation of cardiac output measurements, evaluation of patient's response to treatment, examination of patient, ordering and performing treatments and interventions, obtaining history from patient or surrogate, ordering and review of laboratory studies, ordering and review of radiographic studies, pulse oximetry, re-evaluation of patient's condition, review of old charts and ventilator management.    Intubation  Date/Time: 1/21/2020 8:03 AM  Performed by: Vania Payan DO  Authorized by: Venu Curiel MD   Consent Done: Emergent Situation  Time out: Immediately  "prior to procedure a "time out" was called to verify the correct patient, procedure, equipment, support staff and site/side marked as required.  Indications: airway protection  Description of findings: prolonged post-ictal vs. non-convulsant status epilepticus   Intubation method: video-assisted  Patient status: paralyzed (RSI)  Preoxygenation: BVM  Pretreatment medications: none  Sedatives: etomidate  Paralytic: rocuronium  Laryngoscope size: Mac 4  Tube size: 7.5 mm  Tube type: cuffed  Number of attempts: 1  Cricoid pressure: no  Cords visualized: yes  Post-procedure assessment: ETCO2 monitor  Breath sounds: equal and absent over the epigastrium  Cuff inflated: yes  ETT to lip: 22 cm  Tube secured with: ETT adler  Chest x-ray interpreted by radiologist.  Chest x-ray findings: endotracheal tube in appropriate position  Patient tolerance: Patient tolerated the procedure well with no immediate complications        Labs Reviewed   CBC W/ AUTO DIFFERENTIAL - Abnormal; Notable for the following components:       Result Value    Mean Corpuscular Hemoglobin Conc 31.7 (*)     RDW 16.6 (*)     Lymph # 0.8 (*)     Gran% 83.5 (*)     Lymph% 10.1 (*)     All other components within normal limits   COMPREHENSIVE METABOLIC PANEL - Abnormal; Notable for the following components:    Sodium 147 (*)     Chloride 85 (*)     CO2 36 (*)     Glucose 135 (*)     BUN, Bld 58 (*)     Creatinine 10.8 (*)     Total Protein 9.6 (*)     Alkaline Phosphatase 333 (*)     Anion Gap 26 (*)     eGFR if  5.5 (*)     eGFR if non  4.8 (*)     All other components within normal limits   LACTIC ACID, PLASMA - Abnormal; Notable for the following components:    Lactate (Lactic Acid) 2.4 (*)     All other components within normal limits   TROPONIN I - Abnormal; Notable for the following components:    Troponin I 0.348 (*)     All other components within normal limits   URINALYSIS, REFLEX TO URINE CULTURE - Abnormal; Notable " for the following components:    Appearance, UA Hazy (*)     Protein, UA 2+ (*)     Occult Blood UA 3+ (*)     Leukocytes, UA 1+ (*)     All other components within normal limits    Narrative:     Preferred Collection Type->Urine, Catheterized   URINALYSIS MICROSCOPIC - Abnormal; Notable for the following components:    RBC, UA 65 (*)     Bacteria Few (*)     All other components within normal limits    Narrative:     Preferred Collection Type->Urine, Catheterized   TROPONIN I - Abnormal; Notable for the following components:    Troponin I 0.339 (*)     All other components within normal limits   CBC W/ AUTO DIFFERENTIAL - Abnormal; Notable for the following components:    RDW 16.1 (*)     Gran # (ANC) 9.1 (*)     Gran% 80.5 (*)     Lymph% 10.7 (*)     All other components within normal limits   CBC W/ AUTO DIFFERENTIAL - Abnormal; Notable for the following components:    Hemoglobin 13.8 (*)     Mean Corpuscular Hemoglobin Conc 31.7 (*)     RDW 19.5 (*)     Platelets 129 (*)     Gran% 78.2 (*)     Lymph% 12.6 (*)     Platelet Estimate Decreased (*)     All other components within normal limits   PROCALCITONIN - Abnormal; Notable for the following components:    Procalcitonin 0.27 (*)     All other components within normal limits   ISTAT PROCEDURE - Abnormal; Notable for the following components:    POC Glucose 143 (*)     POC BUN 54 (*)     POC Creatinine 10.2 (*)     POC Chloride 90 (*)     POC TCO2 (MEASURED) 42 (*)     POC Ionized Calcium 0.92 (*)     All other components within normal limits   POCT GLUCOSE - Abnormal; Notable for the following components:    POCT Glucose 155 (*)     All other components within normal limits   ISTAT PROCEDURE - Abnormal; Notable for the following components:    POC PH 7.664 (*)     POC PCO2 32.6 (*)     POC PO2 211 (*)     POC HCO3 37.0 (*)     POC TCO2 38 (*)     All other components within normal limits   ISTAT PROCEDURE - Abnormal; Notable for the following components:    POC  PH 7.654 (*)     POC PCO2 34.0 (*)     POC PO2 138 (*)     POC HCO3 37.8 (*)     POC TCO2 39 (*)     All other components within normal limits   CULTURE, BLOOD   CULTURE, BLOOD   INFLUENZA A & B BY MOLECULAR   CULTURE, RESPIRATORY   PROTIME-INR   ALCOHOL,MEDICAL (ETHANOL)   DRUG SCREEN PANEL, URINE EMERGENCY   LACTIC ACID, PLASMA   TYPE & SCREEN   GROUP & RH   POCT GLUCOSE MONITORING CONTINUOUS   ISTAT CHEM8     EKG Readings: (Independently Interpreted)   Rhythm: Normal Sinus Rhythm. Heart Rate: 90. Axis: Left Axis Deviation. Other Findings: Prolonged QT Interval.   23 mm of convex ST segment elevation is noted at V2 with minimal similar ST changes in V3 with similar findings on previous EKG     ECG Results          EKG 12-lead (In process)  Result time 01/20/20 08:55:51    In process by Interface, Lab In [facility] (01/20/20 08:55:51)                 Narrative:    Test Reason : R41.82,    Vent. Rate : 090 BPM     Atrial Rate : 090 BPM     P-R Int : 154 ms          QRS Dur : 092 ms      QT Int : 414 ms       P-R-T Axes : 072 -38 077 degrees     QTc Int : 506 ms    Normal sinus rhythm  Left axis deviation  Septal infarct (cited on or before 22-NOV-2019)  Prolonged QT  Abnormal ECG  When compared with ECG of 22-NOV-2019 01:12,  The axis Shifted left    Referred By: System System           Confirmed By:                             Imaging Results          MRI Brain Without Contrast (Final result)  Result time 01/20/20 22:08:40    Final result by Gopal Laboy MD (01/20/20 22:08:40)                 Impression:      1. No acute intracranial abnormality identified.  2. Remote microhemorrhages throughout the brain in a predominantly central distribution suggesting hypertensive microangiopathy.  3. Chronic microvascular ischemic change.    Electronically signed by resident: Lul Tripp  Date:    01/20/2020  Time:    21:53    Electronically signed by: Gopal Laboy MD  Date:    01/20/2020  Time:    22:08              Narrative:    EXAMINATION:  MRI BRAIN WITHOUT CONTRAST    CLINICAL HISTORY:  Confusion/delirium, altered LOC, unexplained;.    TECHNIQUE:  Multiplanar multisequence MR imaging of the brain was performed without contrast.    COMPARISON:  CT 01/20/2020; MRI 02/23/2017    FINDINGS:  Intracranial compartment:    Ventricles are stable in size without evidence of hydrocephalus. No extra-axial blood or fluid collections.    Multifocal small areas of gradient susceptibility throughout the brain parenchyma, most pronounced within the basal ganglia bilaterally, consistent with small remote hemorrhages.  Associated areas of encephalomalacia within the bilateral basal ganglia.  Patchy and confluent T2/FLAIR hyperintense foci throughout the supratentorial white matter consistent with chronic microvascular ischemic change.  No mass lesion, acute hemorrhage, edema or acute infarct.    Normal vascular flow voids are preserved.    Skull/extracranial contents (limited evaluation): Calvarium demonstrates heterogeneous marrow signal, similar to prior, which may reflect sequela of renal osteodystrophy or anemia.  Patchy mucosal thickening throughout the paranasal sinuses.                               X-Ray Chest 1 View (Final result)  Result time 01/20/20 14:37:08    Final result by Jessica Bowles MD (01/20/20 14:37:08)                 Impression:      As above      Electronically signed by: Jessica Bowles MD  Date:    01/20/2020  Time:    14:37             Narrative:    EXAMINATION:  XR CHEST 1 VIEW    CLINICAL HISTORY:  post intubation x-ray;    TECHNIQUE:  Single frontal view of the chest was performed.    COMPARISON:  January 28, 2019 09:04    FINDINGS:  Endotracheal tube has been placed in satisfactory position above gerardo level.  Enteric tube projects beyond the gastroesophageal junction and beyond the field of view.  There has been no interval change in the cardiopulmonary status.                               X-Ray  Abdomen AP 1 View (KUB) (Final result)  Result time 01/20/20 14:38:20    Final result by Jessica Bowles MD (01/20/20 14:38:20)                 Impression:      As above      Electronically signed by: Jessica Bowles MD  Date:    01/20/2020  Time:    14:38             Narrative:    EXAMINATION:  XR ABDOMEN AP 1 VIEW    CLINICAL HISTORY:  Encounter for other preprocedural examination    TECHNIQUE:  AP View(s) of the abdomen was performed.    COMPARISON:  September 30, 2019    FINDINGS:  Enteric tube projects into the distal stomach.  There are vascular calcifications noted.  Bowel gas pattern is unremarkable.  A rounded calcification in the right upper quadrant likely relates to gallstone.                               CT Head Without Contrast (Final result)  Result time 01/20/20 10:39:15    Final result by Bairon Bosch MD (01/20/20 10:39:15)                 Impression:      No acute intraparenchymal hemorrhage or major vascular distribution.  No significant detrimental change from prior exam.    Senescent changes.    Remote lacunar type basal ganglia infarct.    Electronically signed by resident: Allen Cardona MD  Date:    01/20/2020  Time:    10:17    Electronically signed by: Bairon Bosch MD  Date:    01/20/2020  Time:    10:39             Narrative:    EXAMINATION:  CT HEAD WITHOUT CONTRAST    CLINICAL HISTORY:  Confusion/delirium, altered LOC, unexplained;    TECHNIQUE:  Low dose axial CT images obtained throughout the head without intravenous contrast. Sagittal and coronal reconstructions were performed.    COMPARISON:  CT head without contrast 11/08/2019, 03/06/2019.    FINDINGS:  Mild generalized cerebral volume loss with compensatory enlargement of the ventricles and sulci.  Stable configuration of the ventricular system with no hydrocephalus.  No extra-axial blood or fluid collections.    Mild patchy periventricular white matter hypoattenuation consistent with chronic ischemic microvascular disease.   Small focal hypoattenuation in the right basal ganglia, likely remote lacunar type infarct.  Unchanged left caudate head hyperdensity, likely represents calcification.  No parenchymal mass, hemorrhage, edema or major vascular distribution infarct.    Skull/extracranial contents (limited evaluation): No fracture. Mastoid air cells and paranasal sinuses are essentially clear.                               X-Ray Chest 1 View (Final result)  Result time 01/20/20 09:24:34    Final result by Lucho Carpio MD (01/20/20 09:24:34)                 Impression:      No significant intrathoracic abnormality.  No significant detrimental interval change in the appearance of the chest since 11/08/2019, allowing for a poorer inspiratory depth level on the current exam.      Electronically signed by: Lucho Carpio MD  Date:    01/20/2020  Time:    09:24             Narrative:    EXAMINATION:  XR CHEST 1 VIEW    COMPARISON:  Comparison is made to 11/08/2019.    FINDINGS:  Allowing for magnification of the cardiomediastinal silhouette related to projection, the heart is not significantly enlarged, and there has been no significant detrimental change in the appearance of the cardiomediastinal silhouette since the examination referenced above.  Lung zones are stable and essentially clear, free of significant airspace consolidation or volume loss.  No pleural fluid.  No pneumothorax.  Vascular stent is superimposed over the right pulmonary apex/upper scapula, as before.                                 Medical Decision Making:   History:   Old Medical Records: I decided to obtain old medical records.  Clinical Tests:   Lab Tests: Ordered and Reviewed  Radiological Study: Ordered and Reviewed  Medical Tests: Reviewed and Ordered  Sepsis Perfusion Assessment: I attest, a sepsis perfusion exam was performed within 6 hours of Septic Shock presentation, following fluid resuscitation.  Other:   I have discussed this case with another health care  provider.       <> Summary of the Discussion: Critical Care       APC / Resident Notes:   54-year-old  male with history of hypertension, CHF, ESRD on HD MWF, GERD, recurrent GI bleed presents to the ED via EMS from [facility] for altered mental status.  Hypertensive.  Regular rate and rhythm.  Lungs are clear.  Abdomen is soft and nontender.  AVF noted to the right upper extremity.  Patient is awake, confused, follows only very simple commands.  No verbal response.  Vomiting and the exam room.  Brown heme positive stool. Differential diagnosis includes but is not limited to sepsis, electrolyte abnormality, ICH, GI bleed, anemia, pneumonia, UTI, intoxication.  Will obtain labs, chest x-ray, CT head.    No leukocytosis. UA shows hematuria. UDS negative. Influenza negative. Cr 10.8, due for dialysis today. Troponin elevated. Lactic acid elevated at 2.4. Blood culture x 2 pending. Ethanol <10.    CT head with no ICH. MRI brain ordered.     12:44 PM  Patient had an episode of hematemesis followed by seizure. Lasted approx 30 seconds. Given ativan.     Discussed with critical care and they evaluated in the ED. Will admit to their service for AMS, GI bleed, new onset seizures.          Attending Attestation:   Physician Attestation Statement for Resident:  As the supervising MD I was personally present during the critical portions of the procedure(s) performed by the resident and was immediately available in the ED to provide services and assistance as needed during the entire procedure.    Physician Attestation Statement for NP/PA:   I have conducted a face to face encounter with this patient in addition to the NP/PA, due to Medical Complexity    Other NP/PA Attestation Additions:    History of Present Illness: 54-year-old  man with comorbidities of ESRD/HD presents by EMS for evaluation of altered mental status and a single episode of vomiting overnight.                                  Clinical Impression:       ICD-10-CM ICD-9-CM   1. Altered mental status R41.82 780.97   2. Encounter for intubation Z01.818 V72.83   3. Hypertensive emergency I16.1 401.9   4. Altered mental status, unspecified altered mental status type R41.82 780.97   5. Seizure R56.9 780.39   6. Gastrointestinal hemorrhage, unspecified gastrointestinal hemorrhage type K92.2 578.9         Disposition:   Disposition: Admitted  Condition: Critical                     Lauren Fang PA-C  01/20/20 1731       Venu Curiel MD  01/21/20 0806

## 2020-01-20 NOTE — HPI
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 On evaluation by gastroenterology patient was intubated and sedated. On chart review patient presented from nursing home with altered mental status. Per nursing home report, patient was seen this AM to bring him to dialysis and was noted to have a 1/4 trash can full of cofee ground emesis, he was confused and lethargic therefore was brought to ER. He did not miss his recent dialysis on Friday. He was supposedly in his usual state of health the day prior and is able to ambulate unassisted and is always AAOx3.   Per nursing report, while in ER, patient cried out, was then noted to have an episode of emesis and started seizing. Patient was then intubated for airway protection. No known history of seizures.    Last EGD 11/12/2019 with LA Grade D esophagitis, small giatial hernia, diffuse mildly erythematous mucosa in the stomach, 1mm sessile gastric polyp with recommendations to use pantoprazole 40mg q12 hours and repeat upper endoscopy in 8 weeks to check healing.    PEndoHx  10/2/19: EGD grade D esophagitis.   9/23/19 EGD Gr D esophagitis, medium hiatal hernia  3/7/19 EGD Gr D esophagitis  5/22/18 EGD LA Gr D esophagitis  3/16/18 EGD gastric polyp. Gr C esophagitis  4/4/17 Colonoscopy. Fair prep. 3mm transverse polyp, 8mm sigmoid polyp. F/u 3 years.

## 2020-01-20 NOTE — ED NOTES
7.5 ETT, 25 @ the lip, + color change, bilateral breath sounds present with no breath sounds present over the epigastrum.

## 2020-01-20 NOTE — ED NOTES
Patient resting on stretcher, patient screamed, when nurse walked into room, patient was noted to be rolled to his left side, vomited x1 coffee ground emesis, patient than began seizing with nurse at bedside, called for assistance, physician at bedside, awaiting new orders, will continue to monitor

## 2020-01-20 NOTE — ED NOTES
LOC: The patient is awake, alert  SKIN: skin intact, no breakdown or bruising noted.  MUSCULOSKELETAL:  no obvious swelling or deformities noted.  RESPIRATORY: Airway is open and patent, respirations are spontaneous, patient has a normal effort and rate, no accessory muscle use noted.    Patient sent from [facility], patient usually does for self and has limited communication due to previous intracranial hermohage, oriented to self, it was noted patients trash can in room was 1/3 way full of coffee ground emesis, patient did not vomit on route via ems, patient is on dialysis, current dialysis days M,W,F, patient has not gone to dialysis this am, patient states he is having abdominal pain, and is mildly tender on palpation, cardiac monitoring in progress, will continue to monitor

## 2020-01-20 NOTE — ED NOTES
Patient vomited x1, emesis noted to be mildly green, looks like stomach bile, no blood noted at this time, will continue to monitor

## 2020-01-20 NOTE — ED NOTES
MD White with critical care reports that he wants the patient to receive EEG prior to going to MRI. MD also notified that patient with tachy HR; No new orders at this time.

## 2020-01-20 NOTE — RESPIRATORY THERAPY
Patient intubated in ER for airway protection.  Intubated with 7.5 ET tube at the 25 cm allan lips. Placed on  a/c 18/480/+5/100%

## 2020-01-20 NOTE — PROCEDURES
Routine EEG Report      Vaughn Retana  [id number]  1965    DATE OF SERVICE:  01/20/2020  REASON FOR CONSULT:  54-year-old man with complicated GI issues and end-stage renal disease who presented with vomiting and an episode of witnessed shaking.  Evaluate for evidence epileptiform activity.    METHODOLOGY   Electroencephalographic (EEG) recording is with electrodes placed according to the International 10-20 placement system.  Thirty two (32) channels of digital signal (sampling rate of 512/sec) including T1 and T2 was simultaneously recorded from the scalp and may include  EKG, EMG, and/or eye monitors.  Recording band pass was 0.1 to 512 hz.  Digital video recording of the patient is simultaneously recorded with the EEG.  The patient is instructed report clinical symptoms which may occur during the recording session.  EEG and video recording is stored and archived in digital format. Activation procedures which include photic stimulation, hyperventilation and instructing patients to perform simple task are done in selected patients.    The EEG is displayed on a monitor screen and can be reviewed using different montages.  Computer assisted analysis is employed to detect spike and electrographic seizure activity.   The entire record is submitted for computer analysis.  The entire recording is visually reviewed and the times identified by computer analysis as being spikes or seizures are reviewed again.  Compresses spectral analysis (CSA) is also performed on the activity recorded from each individual channel.  This is displayed as a power display of frequencies from 0 to 30 Hz over time.   The CSA is reviewed looking for asymmetries in power between homologous areas of the scalp and then compared with the original EEG recording.     [employer] software is also utilized in the review of this study.  This software suite analyzes the EEG recording in multiple domains.  Coherence and rhythmicity is computed to  identify EEG sections which may contain organized seizures.  Each channel undergoes analysis to detect presence of spike and sharp waves which have special and morphological characteristic of epileptic activity.  The routine EEG recording is converted from spacial into frequency domain.  This is then displayed comparing homologous areas to identify areas of significant asymmetry.  Algorithm to identify non-cortically generated artifact is used to separate eye movement, EMG and other artifact from the EEG.      EEG FINDINGS  Background activity:   This EEG is performed after 2 mg IV lorazepam, 1 mg IV midazolam, on a continuous IV infusion of propofol at 40.  The background is disorganized and slow.  There are periods with moderate voltage delta activity with some intermixed theta  frequently by generalize relative suppressions lasting 1-2 second.  There is plenty of overriding beta activity especially centrally.  There is minimal change in the EEG from beginning to end.    Sleep:  There is no normal sleep architecture.    Activation procedures:   Clinically, the patient does not respond to the EEG tech opening and closing his eyes or to nail bed pressure.  There is no significant change in the EEG record with these maneuvers.    Cardiac Monitor:   Tachycardia    Impression:   This is an abnormal routine EEG because of generalized background suppression consistent with a moderate-severe to severe encephalopathy.  This finding is nonspecific with regards to etiology but can be seen in the setting of toxic/metabolic derangements, infection, and as a medication effect.  Overriding faster activities are often seen as a medication effect.  There are no prominent focal findings however encephalopathy obscures focal findings.  There are no epileptiform discharges and no electrographic seizures.    Antoinette Newell MD PhD  Neurology-Epilepsy  [facility].  [facility]

## 2020-01-20 NOTE — ED NOTES
Patient currently sitting up on stretcher able to voice all needs, continues to only be oriented to self but alert, no acute distress noted, cardiac monitoring in progress., will continue to monitor

## 2020-01-21 PROBLEM — G93.41 ACUTE METABOLIC ENCEPHALOPATHY: Status: ACTIVE | Noted: 2020-01-01

## 2020-01-21 NOTE — CONSULTS
"[facility]  Neurology  Consult Note    Patient Name: Vaughn Retana  MRN: [id number]  Admission Date: 1/20/2020  Hospital Length of Stay: 1 days  Code Status: Full Code   Attending Provider: Hero Burton*   Consulting Provider: Rhona Garcia MD  Primary Care Physician: Cori Randall MD  Principal Problem:Seizure    Inpatient consult to Neurology  Consult performed by: Rhona Garcia MD  Consult ordered by: Colt Gonzales MD         Subjective:     Chief Complaint:  AMS     HPI:   53 y/o male with a medical history of ESRD (MWF), left below the knee amputation (2/2 osteomyelitis), CHF, multiple ICH with extensive cerebral microbleeds (5/203- left BG, 9/2016 right basal ganglia hemorrhage, 11/2016  R striatocapsular ICH with IVH) and previous GI bleeds ( EGD 2019 shows esophagitis) presented to the ED on 1/20/20 from [facility] due to altered mental status. Neurology was consulted for possible seizure like activity that was witnessed on the table during CTH examination.     The morning of 1/20, when nursing staff members attempted to wake patient up for his scheduled dialysis they noticed that he was confused, lethargic and had coffee ground emesis in his trash can. Patient's last known normal was the night of 1/19/20. Per Sourav Stauffer (his nurse at [facility]) patient is alert and oriented to only person and place but not time. At baseline patient uses a walker. The nurse was unclear which side he has residual weakness.     On arrival to the ED here, patient had a CTH which revealed no acute process. There was a remote lacunar BG infarct. On the CT table patient was witnessed to have a "tonic-clonic seizure" after one episode of coffee ground emesis. SBP at that time was elevated in the 200s. Patient was emergently intubated by MICU and loaded with 2 mg ativan, and 1500 mg of keppra. Spot EEG revealed generalized background suppression with moderate to severe " encephalopathy however, no epileptiform discharges noted. Please note that EEG was placed and read after the patient received midazolam, ativan, keppra and propofol. MRI brain w/o contrast revealed no acute intracranial abnormalities.Patient was empirically started on antibiotics for meningitis. Lumbar puncture revealed traumatic tab with slight increase in proteins and 2 WBC.       Past Medical History:   Diagnosis Date    Amputation stump pain 9/10/2013    Aspiration pneumonia 7/27/2015    Asterixis 11/8/2016    C. difficile colitis 8/7/2015    Cholelithiasis without obstruction 8/25/2015    Chronic diastolic heart failure     2-23-17   1 - Low normal to mildly depressed left ventricular systolic function (EF 50-55%).    2 - Right ventricular enlargement with mildly depressed systolic function.    3 - Left ventricular diastolic dysfunction.    4 - Right atrial enlargement.    5 - Severe tricuspid regurgitation.    6 - Pulmonary hypertension. The estimated PA systolic pressure is 86 mmHg.    7 - Increased central venous pressure.    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    Metabolic acidosis, IAG, reduced excretion of inorganic acids     Myoclonic jerking 9/20/2016    Noncompliance with medication regimen 12/4/2018    Secondary hyperparathyroidism (of renal origin)     Secondary pulmonary hypertension 3/23/2017    Stenosis of  arteriovenous dialysis fistula 9/18/2014    TB lung, latent 08/25/2015    Negative Quantiferon Gold 3-23-17       Past Surgical History:   Procedure Laterality Date    COLONOSCOPY      COLONOSCOPY N/A 4/4/2017    Procedure: COLONOSCOPY;  Surgeon: Walker Stern MD;  Location: [facility] ([address] [place] [address]);  Service: Endoscopy;  Laterality: N/A;  PA Systolic Pressure 85.56. HD Patient MWF, K+ lab prior to procedure.     ESOPHAGOGASTRODUODENOSCOPY N/A 6/12/2018    Procedure: EGD (ESOPHAGOGASTRODUODENOSCOPY);  Surgeon: Man Galicia MD;  Location: [facility] ([address] [place] [address]);  Service: Endoscopy;  Laterality: N/A;  EGD in 8-12 weeks with Dr. Galicia on 4th floor for follow up erosive esophagitis and Mejia's surveillance.    Patient should be on Pantoprazole 40mg every 12 hours or the equivulant of another PPI    awaiting for patient to reply back regarding changing    ESOPHAGOGASTRODUODENOSCOPY N/A 3/7/2019    Procedure: EGD (ESOPHAGOGASTRODUODENOSCOPY);  Surgeon: Man Galicia MD;  Location: [facility] ([address] [place] [address]);  Service: Endoscopy;  Laterality: N/A;    ESOPHAGOGASTRODUODENOSCOPY N/A 9/23/2019    Procedure: EGD (ESOPHAGOGASTRODUODENOSCOPY);  Surgeon: Keanu Rainey MD;  Location: [facility] ([address] [place] [address]);  Service: Endoscopy;  Laterality: N/A;    ESOPHAGOGASTRODUODENOSCOPY N/A 10/2/2019    Procedure: EGD (ESOPHAGOGASTRODUODENOSCOPY);  Surgeon: Kevin De La Paz MD;  Location: [address]);  Service: Endoscopy;  Laterality: N/A;    ESOPHAGOGASTRODUODENOSCOPY N/A 11/12/2019    Procedure: EGD (ESOPHAGOGASTRODUODENOSCOPY);  Surgeon: Kevin De La Paz MD;  Location: [facility] ([address] [place] [address]);  Service: Endoscopy;  Laterality: N/A;    FOOT AMPUTATION THROUGH METATARSAL      left foot    LEG AMPUTATION THROUGH KNEE  12/18/2013    left BKA    R AVF  9/12/12    UPPER GASTROINTESTINAL ENDOSCOPY         Review of patient's allergies indicates:   Allergen Reactions    Fosrenol [lanthanum] Nausea And Vomiting     Nausea and vomiting        Current Neurological Medications:     No current facility-administered medications on file prior to encounter.      Current Outpatient Medications on File Prior to Encounter   Medication Sig    acetaminophen (TYLENOL) 325 MG tablet Take 2 tablets (650 mg total) by mouth every 8 (eight) hours as needed.    carvedilol (COREG) 3.125 MG tablet Take 1 tablet (3.125 mg total) by mouth 2 (two) times daily with meals.    metoclopramide HCl (REGLAN) 10 MG tablet Take 1 tablet (10 mg total) by mouth 3 (three) times daily before meals.    midodrine (PROAMATINE) 5 MG Tab Please take 30 min before dialysis on Monday Wednesday and Friday    ondansetron (ZOFRAN) 8 MG tablet Take 1 tablet (8 mg total) by mouth every 12 (twelve) hours as needed for Nausea.    pantoprazole (PROTONIX) 40 MG tablet Take 1 tablet (40 mg total) by mouth 2 (two) times daily.    promethazine (PHENERGAN) 25 MG tablet Take 1 tablet (25 mg total) by mouth every 6 (six) hours as needed for Nausea.    RENAPLEX-D 800 mcg-12.5 mg -2,000 unit Tab Take 1 tablet by mouth once daily.    sevelamer carbonate (RENVELA) 800 mg Tab Take 1 tablet (800 mg total) by mouth 3 (three) times daily with meals.    sucralfate (CARAFATE) 100 mg/mL suspension Take 10 mLs (1 g total) by mouth 4 (four) times daily before meals and nightly.     Family History     Problem Relation (Age of Onset)    Alcohol abuse Maternal Grandmother    Diabetes Brother, Maternal Grandfather    Early death Mother    Heart disease Father    Hyperlipidemia Father    Hypertension Father, Sister    Kidney disease Father        Tobacco Use    Smoking status: Former Smoker     Packs/day: 1.00     Years: 10.00     Pack years: 10.00    Smokeless tobacco: Never Used   Substance and Sexual Activity    Alcohol use: No    Drug use: No    Sexual activity: Yes     Partners: Female     Birth control/protection: None     Review of Systems   Unable to perform ROS: Intubated   Constitutional: Negative  for fever.   Skin: Positive for rash.     Objective:     Vital Signs (Most Recent):  Temp: 98.6 °F (37 °C) (01/21/20 1630)  Pulse: 101 (01/21/20 1645)  Resp: 17 (01/21/20 1645)  BP: (!) 137/91 (01/21/20 1645)  SpO2: (!) 94 % (01/21/20 1645) Vital Signs (24h Range):  Temp:  [97.9 °F (36.6 °C)-99.4 °F (37.4 °C)] 98.6 °F (37 °C)  Pulse:  [] 101  Resp:  [10-21] 17  SpO2:  [94 %-100 %] 94 %  BP: ()/(52-93) 137/91     Weight: 57 kg (125 lb 10.6 oz)  Body mass index is 20.28 kg/m².    Physical Exam   Neurological:   Reflex Scores:       Tricep reflexes are 1+ on the right side and 1+ on the left side.       Bicep reflexes are 1+ on the right side and 1+ on the left side.       Brachioradialis reflexes are 1+ on the right side and 1+ on the left side.       Patellar reflexes are 1+ on the right side and 1+ on the left side.       Achilles reflexes are 1+ on the right side and 1+ on the left side.      NEUROLOGICAL EXAMINATION:     MENTAL STATUS        Examination was done after propofol was off for 20 minutes  -Patient was responsive to painful stimuli however, not to verbal stimuli.   -Around 4:30 PM when patient was off of propofol (for a couple of hours), he was responsive to verbal stimuli  -Does not follow any commands        CRANIAL NERVES        -Cough and gag reflex present  - Corneal reflex no present (possibly due to sedation)      MOTOR EXAM   Muscle bulk: decreased  Overall muscle tone: decreased  Right arm tone: normal  Left arm tone: normal  Right leg tone: normal  Left leg tone: normal       -Withdraws to pain on upper extremities   -L BKA      REFLEXES     Reflexes   Right brachioradialis: 1+  Left brachioradialis: 1+  Right biceps: 1+  Left biceps: 1+  Right triceps: 1+  Left triceps: 1+  Right patellar: 1+  Left patellar: 1+  Right achilles: 1+  Left achilles: 1+  Right : 1+  Left : 1+       RUE fistula      SENSORY EXAM        Could not test      GAIT AND COORDINATION        Could not  test        Significant Labs:   BMP:   Recent Labs   Lab 01/20/20  0832 01/21/20  0116   * 96   * 143   K 4.9 4.7   CL 85* 89*   CO2 36* 30*   BUN 58* 66*   CREATININE 10.8* 11.9*   CALCIUM 10.1 9.6   MG  --  2.1     CBC:   Recent Labs   Lab 01/20/20  2312 01/21/20  0500 01/21/20  1206   WBC 7.58 7.35 8.78   HGB 13.6* 13.1* 13.9*   HCT 41.6 41.4 43.7    177 193     CMP:   Recent Labs   Lab 01/20/20  0832 01/21/20  0116   * 96   * 143   K 4.9 4.7   CL 85* 89*   CO2 36* 30*   BUN 58* 66*   CREATININE 10.8* 11.9*   CALCIUM 10.1 9.6   MG  --  2.1   PROT 9.6* 8.1   ALBUMIN 3.7 3.2*   BILITOT 0.5 0.5   ALKPHOS 333* 260*   AST 22 24   ALT 12 10   ANIONGAP 26* 24*   EGFRNONAA 4.8* 4.2*       Significant Imaging: I have reviewed all pertinent imaging results/findings within the past 24 hours.    Assessment and Plan:     Encephalopathy  53 y/o male with a medical history of ESRD (MWF), left below the knee amputation (2/2 osteomyelitis), CHF, multiple ICH with extensive cerebral microbleeds (5/203- left BG, 9/2016 right basal ganglia hemorrhage, 11/2016  R striatocapsular ICH with IVH) and previous GI bleeds ( EGD 2019 shows esophagitis) presented to the ED on 1/20/20 from [facility] due to altered mental status. Neurology was consulted for possible seizure like activity that was witnessed on the table during CTH examination. Unclear of seizure semiology. Mentions GTC however, primary team nor nurse unable to tell me what the seizure looked like. Patient was then emergently intubated as there was concern for status. Patient was then loaded with 2 mg ativan, 1500 mg of keppra and 1 mg of midazolam. He was then started on propofol. Spot EEG after the initiation of these medications reveal generealized background suppression with moderate to severe encephalopathy. There were no epileptiform discharges noted. Patient empirically started on antibiotics for meningitis however, CSF  studies thus far unrevealing for infection. Please note on arrival blood pressure was 217/132. BUN/Cr elevated to 58/10.8. Patient missed his dialysis yesterday. MRI unrevealing for any acute processes     Differential Diagnosis   Cause for his encephalopathy includes hypertensive encephalopathy versus uremic encephalopathy. Unclear if patient was having seizures when he initially came in as he was loaded with ativan, midazolam and keppra and then started on propofol. MRI brain however, was unrevealing for any acute processes. Per epilepsy attending note, after EEG was placed on patient (after he was off of the propofol), underlying structural lesion on the left, with underlying epileptiform potential on the left. MICU team then loaded the patient with 1500 mg.     Recommendations  1) Continue EEG off of propofol  2) Can consider discontinuing antibiotics for meningitis as lumbar puncture studies does not reveal and infectious etiology  3) Continue blood pressure control   4) Please dialyze patient to decrease chances of uremic encephalopathy            VTE Risk Mitigation (From admission, onward)         Ordered     Place sequential compression device  Until discontinued      01/20/20 1503     IP VTE HIGH RISK PATIENT  Once      01/20/20 1503                Thank you for your consult. I will follow-up with patient. Please contact us if you have any additional questions.    Rhona Garcia MD  Neurology  [facility]

## 2020-01-21 NOTE — SUBJECTIVE & OBJECTIVE
Past Medical History:   Diagnosis Date    Amputation stump pain 9/10/2013    Aspiration pneumonia 7/27/2015    Asterixis 11/8/2016    C. difficile colitis 8/7/2015    Cholelithiasis without obstruction 8/25/2015    Chronic diastolic heart failure     2-23-17   1 - Low normal to mildly depressed left ventricular systolic function (EF 50-55%).    2 - Right ventricular enlargement with mildly depressed systolic function.    3 - Left ventricular diastolic dysfunction.    4 - Right atrial enlargement.    5 - Severe tricuspid regurgitation.    6 - Pulmonary hypertension. The estimated PA systolic pressure is 86 mmHg.    7 - Increased central venous pressure.    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 ([address] [place] [address]);  Service: Endoscopy;  Laterality: N/A;  PA Systolic Pressure 85.56. HD Patient MWF, K+ lab  prior to procedure.     ESOPHAGOGASTRODUODENOSCOPY N/A 6/12/2018    Procedure: EGD (ESOPHAGOGASTRODUODENOSCOPY);  Surgeon: Man Galicia MD;  Location: [facility] (2ND FLR);  Service: Endoscopy;  Laterality: N/A;  EGD in 8-12 weeks with Dr. Galicia on 4th floor for follow up erosive esophagitis and Mejia's surveillance.    Patient should be on Pantoprazole 40mg every 12 hours or the equivulant of another PPI    awaiting for patient to reply back regarding changing    ESOPHAGOGASTRODUODENOSCOPY N/A 3/7/2019    Procedure: EGD (ESOPHAGOGASTRODUODENOSCOPY);  Surgeon: Man Galicia MD;  Location: [facility] (2ND FLR);  Service: Endoscopy;  Laterality: N/A;    ESOPHAGOGASTRODUODENOSCOPY N/A 9/23/2019    Procedure: EGD (ESOPHAGOGASTRODUODENOSCOPY);  Surgeon: Keanu Rainey MD;  Location: [facility] (2ND FLR);  Service: Endoscopy;  Laterality: N/A;    ESOPHAGOGASTRODUODENOSCOPY N/A 10/2/2019    Procedure: EGD (ESOPHAGOGASTRODUODENOSCOPY);  Surgeon: Kevin De La Paz MD;  Location: [facility] (2ND FLR);  Service: Endoscopy;  Laterality: N/A;    ESOPHAGOGASTRODUODENOSCOPY N/A 11/12/2019    Procedure: EGD (ESOPHAGOGASTRODUODENOSCOPY);  Surgeon: Kevin De La Paz MD;  [facility]  Service: Endoscopy;  Laterality: N/A;    FOOT AMPUTATION THROUGH METATARSAL      left foot    LEG AMPUTATION THROUGH KNEE  12/18/2013    left BKA    R AVF  9/12/12    UPPER GASTROINTESTINAL ENDOSCOPY         Review of patient's allergies indicates:   Allergen Reactions    Fosrenol [lanthanum] Nausea And Vomiting     Nausea and vomiting     Current Facility-Administered Medications   Medication Frequency    0.9%  NaCl infusion Once    acyclovir (ZOVIRAX) 320 mg in dextrose 5 % 50 mL IVPB Q24H    cefTRIAXone (ROCEPHIN) 2 g in dextrose 5 % 50 mL IVPB Q12H    chlorhexidine 0.12 % solution 15 mL BID    levETIRAcetam in NaCl (iso-os) IVPB 1,500 mg Once    pantoprazole injection 40 mg BID    sevelamer carbonate pwpk  1.6 g TID    sodium chloride 0.9% flush 10 mL PRN    vancomycin - pharmacy to dose pharmacy to manage frequency     Family History     Problem Relation (Age of Onset)    Alcohol abuse Maternal Grandmother    Diabetes Brother, Maternal Grandfather    Early death Mother    Heart disease Father    Hyperlipidemia Father    Hypertension Father, Sister    Kidney disease Father        Tobacco Use    Smoking status: Former Smoker     Packs/day: 1.00     Years: 10.00     Pack years: 10.00    Smokeless tobacco: Never Used   Substance and Sexual Activity    Alcohol use: No    Drug use: No    Sexual activity: Yes     Partners: Female     Birth control/protection: None     Review of Systems   Unable to perform ROS: Intubated     Objective:     Vital Signs (Most Recent):  Temp: 99.4 °F (37.4 °C) (01/21/20 1200)  Pulse: 99 (01/21/20 1400)  Resp: 19 (01/21/20 1400)  BP: (!) 156/88 (01/21/20 1400)  SpO2: 100 % (01/21/20 1400)  O2 Device (Oxygen Therapy): ventilator (01/21/20 1400) Vital Signs (24h Range):  Temp:  [97.9 °F (36.6 °C)-99.4 °F (37.4 °C)] 99.4 °F (37.4 °C)  Pulse:  [] 99  Resp:  [10-21] 19  SpO2:  [95 %-100 %] 100 %  BP: ()/() 156/88     Weight: 57 kg (125 lb 10.6 oz) (01/21/20 1300)  Body mass index is 20.28 kg/m².  Body surface area is 1.63 meters squared.    I/O last 3 completed shifts:  In: 1374.9 [I.V.:174.9; NG/GT:50; IV Piggyback:1150]  Out: 0     Physical Exam   Constitutional: He appears well-nourished. He appears ill. He is sedated and intubated.   HENT:   Head: Normocephalic and atraumatic.   Mouth/Throat: No oropharyngeal exudate.   Eyes: Right eye exhibits no discharge. Left eye exhibits no discharge. No scleral icterus.   Neck: Normal range of motion. Neck supple. No JVD present.   Cardiovascular: Regular rhythm and normal heart sounds.   No murmur heard.  Pulmonary/Chest: He is intubated. He has rales (bilateral lung bases ).   Abdominal: Soft. Bowel sounds are normal. He exhibits  no distension.   Musculoskeletal: Normal range of motion. He exhibits deformity. He exhibits no edema.   Left below knee amputation   Neurological:   Intubated/sedated   Skin: Skin is warm and dry.   Vitals reviewed.    Significant Labs:  CBC:   Recent Labs   Lab 01/21/20  1206   WBC 8.78   RBC 4.90   HGB 13.9*   HCT 43.7      MCV 89   MCH 28.4   MCHC 31.8*     CMP:   Recent Labs   Lab 01/21/20  0116   GLU 96   CALCIUM 9.6   ALBUMIN 3.2*   PROT 8.1      K 4.7   CO2 30*   CL 89*   BUN 66*   CREATININE 11.9*   ALKPHOS 260*   ALT 10   AST 24   BILITOT 0.5

## 2020-01-21 NOTE — PROCEDURE NOTE ADDENDUM
"Lumbar Puncture  Date/Time: 1/21/2020 2:29 AM  Performed by: Debbie Mac MD  Authorized by: Debbie Mac MD   Consent Done: Yes  Site marked: the operative site was marked  Time out: Immediately prior to procedure a "time out" was called to verify the correct patient, procedure, equipment, support staff and site/side marked as required.  Indications: diagnostic evaluation  Anesthesia: local infiltration    Anesthesia:  Local Anesthetic: lidocaine 1% without epinephrine  Anesthetic total: 3 mL  Patient sedated: yes  Sedatives: propofol  Preparation: Patient was prepped and draped in the usual sterile fashion.  Lumbar space: L3-L4 interspace  Patient's position: left lateral decubitus  Needle gauge: 18  Needle type: spinal needle - Quincke tip  Needle length: 2.5 in  Number of attempts: 1  Opening pressure: 14 cm H2O  Fluid appearance: clear  Tubes of fluid: 4  Total volume: 7 ml  Post-procedure: site cleaned and adhesive bandage applied  Complications: No  Specimens: Yes  Implants: No  Patient tolerance: Patient tolerated the procedure well with no immediate complications      4 CSF specimen tubes sent to lab for evaluation   "

## 2020-01-21 NOTE — PROCEDURES
[facility]     PRELIMINARY C-EEG REPORT:  Review: 1/21/2020, 10:47 (start) - 13:09    Background of sharply contoured mixed delta-theta activity with faster frequencies seen  Asymmetry with prominent left sided slowing (delta > theta activity seen)         Occasional bilateral (left > right) epileptiform discharges are seen without clear evolution or associated clinical correlate.         Irregular heart rate noted on EKG.    Findings are suggestive of:  1) underlying structural lesion on the left  2) underlying epileptiform potential, maximal on the left  3) severe cerebral dysfunction    Recommend: re-start Levetiracetam 1500mg - one dose now; will need continuing doses; levels can guide dosing    Full report to follow.  Will continue to monitor.     Findings and recs were conveyed to primary team.  Thank you for involving us in the care of this patient.    Lolis Vazquez MD, DAYANA(), FACNS, APRIL.  Neurology-Epilepsy.  [facility].

## 2020-01-21 NOTE — ASSESSMENT & PLAN NOTE
Nutrition Problem:  Severe Protein-Calorie Malnutrition  Malnutrition in the context of Chronic Illness/Injury    Related to (etiology):  Inadequate energy intake    Signs and Symptoms (as evidenced by):  Body Fat Depletion: severe depletion of orbitals and triceps   Muscle Mass Depletion: severe depletion of temples, clavicle region and interosseous muscle   Weight Loss: 14% x 6 months     Interventions(treatment strategy):  Collaboration of nutrition care w/ other providers    Nutrition Diagnosis Status:  New

## 2020-01-21 NOTE — H&P
[facility]  Critical Care Medicine  History & Physical    Patient Name: Vaughn Retana  MRN: [id number]  Admission Date: 1/20/2020  Hospital Length of Stay: 0 days  Code Status: Full Code  Attending Physician: Hero Burton*   Primary Care Provider: Primary Doctor No   Principal Problem: Seizure    Subjective:     HPI:  Patient is a 54 year old male with extensive medical history including ESRD on dialysis MWF (has not received it today), L below knee amputation 2/2 osteomyelitis, dCHF (last echo 8/2/19 [facility]), GERD, h/o multiple ICH w/o residual deficits, and multiple instances of GI bleed (last EGD 11/12/19, esophagitis) who presents to ED [facility] nursing home due to altered mental status. From NH report, nursing home staff went to wake the patient for his morning dialysis. He was confused, lethargic, had a moderate amount of brown colored emesis in his trash can. Patient last got dialysis on Friday. Patient is normally able to ambulate on his own and is alert and oriented; nursing staff reports that he appeared normal yesterday.       At the time of ICU consult, initial work up showed largely unremarkable cbc with no leukocytosis. CMP significant for a bicarb of 36; patient is ESRD with creatinine of 10.8. Patient still makes some urine, and UA was without evidence for UTI. Lactic acid acid was mildly elevated at 2.4 and troponin mildly elevated at .348->.339. INR is at 1.2. Alcohol and drug screen negative. Patient was given versed prior to undergoing CT scan; which showed no acute intraparenchymal hemorrhage or major vascular distribution, senescent changes, and remote lacunar type basal ganglia infarct. Shortly after CT, patient had a witnessed tonic clonic seizure. He received 2 mg of ativan and 1500 of keppra. Patient became non-responsive afterwards and was severely hypertensive with systolic bp in the 200's. Patient was intubated for airway protection and started on  propofol and Cardene drip for hypertension. Family updated over the phone and at bedside.                Hospital/ICU Course:  No notes on file     Past Medical History:   Diagnosis Date    Amputation stump pain 9/10/2013    Aspiration pneumonia 7/27/2015    Asterixis 11/8/2016    C. difficile colitis 8/7/2015    Cholelithiasis without obstruction 8/25/2015    Chronic diastolic heart failure     2-23-17   1 - Low normal to mildly depressed left ventricular systolic function (EF 50-55%).    2 - Right ventricular enlargement with mildly depressed systolic function.    3 - Left ventricular diastolic dysfunction.    4 - Right atrial enlargement.    5 - Severe tricuspid regurgitation.    6 - Pulmonary hypertension. The estimated PA systolic pressure is 86 mmHg.    7 - Increased central venous pressure.    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    Metabolic acidosis, IAG, reduced excretion of inorganic acids     Myoclonic jerking 9/20/2016    Noncompliance with medication regimen 12/4/2018    Secondary hyperparathyroidism (of renal origin)     Secondary pulmonary hypertension 3/23/2017    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    TB lung, latent 08/25/2015    Negative Quantiferon Gold 3-23-17       Past Surgical History:   Procedure Laterality Date    COLONOSCOPY      COLONOSCOPY N/A 4/4/2017    Procedure: COLONOSCOPY;   Surgeon: Walker Stern MD;  [facility]  Service: Endoscopy;  Laterality: N/A;  PA Systolic Pressure 85.56. HD Patient MWF, K+ lab prior to procedure.     ESOPHAGOGASTRODUODENOSCOPY N/A 6/12/2018    Procedure: EGD (ESOPHAGOGASTRODUODENOSCOPY);  Surgeon: Man Galicia MD;  [facility]  Service: Endoscopy;  Laterality: N/A;  EGD in 8-12 weeks with Dr. Galicia on 4th floor for follow up erosive esophagitis and Mejia's surveillance.    Patient should be on Pantoprazole 40mg every 12 hours or the equivulant of another PPI    awaiting for patient to reply back regarding changing    ESOPHAGOGASTRODUODENOSCOPY N/A 3/7/2019    Procedure: EGD (ESOPHAGOGASTRODUODENOSCOPY);  Surgeon: Man Galicia MD;  [address]  Service: Endoscopy;  Laterality: N/A;    ESOPHAGOGASTRODUODENOSCOPY N/A 9/23/2019    Procedure: EGD (ESOPHAGOGASTRODUODENOSCOPY);  Surgeon: Keanu Rainey MD;  Location: [facility] ([address] [place] [address]);  Service: Endoscopy;  Laterality: N/A;    ESOPHAGOGASTRODUODENOSCOPY N/A 10/2/2019    Procedure: EGD (ESOPHAGOGASTRODUODENOSCOPY);  Surgeon: Kevin De La Paz MD;  Location: [address]);  Service: Endoscopy;  Laterality: N/A;    ESOPHAGOGASTRODUODENOSCOPY N/A 11/12/2019    Procedure: EGD (ESOPHAGOGASTRODUODENOSCOPY);  Surgeon: Kevin De La Paz MD;  Location: [address]);  Service: Endoscopy;  Laterality: N/A;    FOOT AMPUTATION THROUGH METATARSAL      left foot    LEG AMPUTATION THROUGH KNEE  12/18/2013    left BKA    R AVF  9/12/12    UPPER GASTROINTESTINAL ENDOSCOPY         Review of patient's allergies indicates:   Allergen Reactions    Fosrenol [lanthanum] Nausea And Vomiting     Nausea and vomiting       Family History     Problem Relation (Age of Onset)    Alcohol abuse Maternal Grandmother    Diabetes Brother, Maternal Grandfather    Early death Mother    Heart disease Father    Hyperlipidemia Father    Hypertension Father, Sister     Kidney disease Father        Tobacco Use    Smoking status: Former Smoker     Packs/day: 1.00     Years: 10.00     Pack years: 10.00    Smokeless tobacco: Never Used   Substance and Sexual Activity    Alcohol use: No    Drug use: No    Sexual activity: Yes     Partners: Female     Birth control/protection: None      Review of Systems   Unable to perform ROS: Intubated     Objective:     Vital Signs (Most Recent):  Temp: 98.6 °F (37 °C) (01/20/20 0805)  Pulse: (!) 120(Simultaneous filing. User may not have seen previous data.) (01/20/20 1711)  Resp: 18 (01/20/20 0805)  BP: 112/66(Simultaneous filing. User may not have seen previous data.) (01/20/20 1711)  SpO2: 100 %(Simultaneous filing. User may not have seen previous data.) (01/20/20 1711) Vital Signs (24h Range):  Temp:  [98.6 °F (37 °C)] 98.6 °F (37 °C)  Pulse:  [] 120  Resp:  [18] 18  SpO2:  [94 %-100 %] 100 %  BP: (112-217)/() 112/66   Weight: 72.6 kg (160 lb)  Body mass index is 25.82 kg/m².      Intake/Output Summary (Last 24 hours) at 1/20/2020 1726  Last data filed at 1/20/2020 1536  Gross per 24 hour   Intake 650 ml   Output --   Net 650 ml       Physical Exam   Constitutional: He appears well-developed and well-nourished.   HENT:   Head: Normocephalic and atraumatic.   Mouth/Throat: No oropharyngeal exudate.   Eyes: Conjunctivae are normal.   Pupils sluggish, upper left gaze   Neck: Normal range of motion. Neck supple. No JVD present.   Cardiovascular: Regular rhythm and normal heart sounds.   No murmur heard.  tachycardic   Pulmonary/Chest: He has rales (bilateral lung bases ).   Intubated   Abdominal: Soft. Bowel sounds are normal. He exhibits no distension.   Musculoskeletal: Normal range of motion. He exhibits edema.   Left below knee amputation   Neurological:   Patient not responsive to physical stimuli. Left upward gaze, does not track or follow commands.    Skin: Skin is warm and dry.   Vitals reviewed.      Vents:  Vent Mode: A/C  (01/20/20 1711)  Ventilator Initiated: Yes (01/20/20 1327)  Set Rate: 14 bmp (01/20/20 1711)  Vt Set: 480 mL (01/20/20 1711)  Pressure Support: 0 cmH20 (01/20/20 1711)  PEEP/CPAP: 5 cmH20 (01/20/20 1711)  Oxygen Concentration (%): 30 (01/20/20 1711)  Peak Airway Pressure: 23 cmH2O (01/20/20 1711)  Plateau Pressure: 0 cmH20 (01/20/20 1711)  Total Ve: 7.16 mL (01/20/20 1711)  Lines/Drains/Airways     Drain                 Hemodialysis AV Fistula Right upper arm -- days         Hemodialysis AV Fistula Right upper arm -- days          Airway                 Airway - Non-Surgical 01/20/20 1325 Endotracheal Tube-Hi/Lo less than 1 day          Peripheral Intravenous Line                 Peripheral IV - Single Lumen 01/20/20 0831 20 G Left Antecubital less than 1 day         Peripheral IV - Single Lumen 01/20/20 0838 22 G Left Wrist less than 1 day         Peripheral IV - Single Lumen 01/20/20 1320 20 G Left Other less than 1 day              Significant Labs:    CBC/Anemia Profile:  Recent Labs   Lab 01/20/20  0832 01/20/20  0837 01/20/20  1505   WBC 7.51  --  11.26   HGB 15.0  --  15.3   HCT 47.3 50 46.1     --  197   MCV 90  --  87   RDW 16.6*  --  16.1*        Chemistries:  Recent Labs   Lab 01/20/20  0832   *   K 4.9   CL 85*   CO2 36*   BUN 58*   CREATININE 10.8*   CALCIUM 10.1   ALBUMIN 3.7   PROT 9.6*   BILITOT 0.5   ALKPHOS 333*   ALT 12   AST 22       Coagulation:   Recent Labs   Lab 01/20/20  0832   INR 1.2     Lactic Acid:   Recent Labs   Lab 01/20/20  0832   LACTATE 2.4*     Troponin:   Recent Labs   Lab 01/20/20  0832 01/20/20  1505   TROPONINI 0.348* 0.339*     Urine Studies:   Recent Labs   Lab 01/20/20  0908   COLORU Yellow   APPEARANCEUA Hazy*   PHUR 8.0   SPECGRAV 1.015   PROTEINUA 2+*   GLUCUA Negative   KETONESU Negative   BILIRUBINUA Negative   OCCULTUA 3+*   NITRITE Negative   LEUKOCYTESUR 1+*   RBCUA 65*   WBCUA 4   BACTERIA Few*   HYALINECASTS 0       Significant Imaging: I have  reviewed all pertinent imaging results/findings within the past 24 hours.    Assessment/Plan:     Neuro  * Seizure  Altered mental status    Patient initially presented to ED prior to getting dialysis due to AMS and brown emesis. Had witnessed tonic clonic seizure requiring ativan shortly after getting a CT head. He was loaded with 1500 mg Keppra, intubated for airway protection, and started on propofol for sedation. Upon physical exam, patient remained unresponsive to verbal or tactile stimuli and had upward left sided gaze with sluggish pupils. Concern for status epilepticus. Differential diagnosis includes polysubstance, sepsis, intracranial abnormalities, and PRES.    - Spot EEG without evidence of current seizure. However patient already received keppra, ativan, and sedated on propofol. Ordered 24 hour EEG  - Substance induced: UDS negative, alcohol level wnl  - Sepsis: Concern for meningitis. Started vancomycin, rocephin, ampicillin, and acyclovir. Follow up blood cultures, sputum culture+gram stain (concern for aspiration), and procalcitonin. Initial lactate 2.4, likely due to seizure event. Will follow up repeat lactate. Patient received 500 ml saline in ED.   - Intracranial: patient with history of ICH with no functional deficits. Possibly multiple foci for seizure overtime. CT without acute changes, MRI brain without contrast ordered.  - PRES: Patient presented with hypertension, highest BP  214/120. Patient currently on cardene drip (goal BP sys of 160-180). Patient also sedated on propofol. Follow up MRI brain       Cardiac/Vascular  Renovascular hypertension  - Patient on coreg at nursing home; has been compliant as given by nursing home  - Patient with hypertensive emergency on admit  - Started Cardene drip, titrate to BP of 160-180   - Follow up MRI brain, concern for PRES    Chronic diastolic heart failure  - Last echo done at [facility] 8/2/19, EF>55%, bi-atrial enlargement  - Repeat echo  ordered    Renal/  ESRD on hemodialysis  - patient normally MWF dialysis. Last full dialysis session done last Friday  - No need for urgent dialysis today, will get nephrology on board for dialysis once patient is stabilized    GI  Gastrointestinal hemorrhage with hematemesis  GERD with esophagitis    Patient with reports of coffee ground emesis prior to transport to ED. In ED, dark brown contents suctioned from stomach. Hemoglobin remains stable at 15 and patient is hypertensive. Patient is well known to GI. His most recent scope was in November that just showed esophagitis similar to his previous EGD.     - Will obtain 2 large bore IV  - He received 80 mg IV pantoprazole, will start 40 mg IV BID starting tomorrow  - Q6 cbc for now, will change pending Hg stability  - GI consulted and contacted. Not urgent to scope at this time        Critical Care Daily Checklist:    A: Awake: RASS Goal/Actual Goal:    Actual:     B: Spontaneous Breathing Trial Performed?     C: SAT & SBT Coordinated?  N/A                      D: Delirium: CAM-ICU     E: Early Mobility Performed? No   F: Feeding Goal:    Status:     Current Diet Order   No orders of the defined types were placed in this encounter.      AS: Analgesia/Sedation propofol   T: Thromboembolic Prophylaxis Mechanical due to GI bleed   H: HOB > 300 Yes   U: Stress Ulcer Prophylaxis (if needed) IV PPI   G: Glucose Control NA   B: Bowel Function     I: Indwelling Catheter (Lines & Stevens) Necessity Peripheral IV, stevens   D: De-escalation of Antimicrobials/Pharmacotherapies Vanc, rocephin, amp, acyclovir    Plan for the day/ETD MRI, 24 hour EEG    Code Status:  Family/Goals of Care: Full Code         Critical secondary to Patient has a condition that poses threat to life and bodily function: Seizures, needs airway protection     Critical care was time spent personally by me on the following activities: development of treatment plan with patient or surrogate and bedside  caregivers, discussions with consultants, evaluation of patient's response to treatment, examination of patient, ordering and performing treatments and interventions, ordering and review of laboratory studies, ordering and review of radiographic studies, pulse oximetry, re-evaluation of patient's condition. This critical care time did not overlap with that of any other provider or involve time for any procedures.     Colt Gonzales MD  Critical Care Medicine  [facility]

## 2020-01-21 NOTE — CONSULTS
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 The patient is currently intubated, therefore, HPI will come from Epic charting. According to the notes, the patient was transferred here after being found Monday morning (1/20) confused and lethargic with a moderate amount of brown, colored emesis at bedside by the nursing home staff. The nursing staff reported that he appeared normal the day before. In the ED, initial work up was found to be unremarkable. UA negative for UTI. Lactic acid was mildly elevated at 2.4. Alcohol and drug screen negative. The patient underwent a CTH, which showed no acute intraparenchymal hemorrhage or major vascular distribution, senescent changes, and remote lacunar type basal ganglia infarct. However, after the CT, the patient had a episode of emesis (?coffee ground) and a witnessed tonic clonic seizure. He was also noted to be hypertensive (SBP >200s). The patient was intubated for airway protection and started on propofol and Cardene drip for hypertension (now off). He was also given Ativan and Keppra in the ED. He is now on a continuous EEG. GI was consulted for a UGI bleed. The patient's Hgb is currently 13.9. GI with no plans for  endoscopic interventions at this time.     The patient was last dialyze on Friday (1/17) at his outpatient dialysis unit. He is a MWF dialysis patient of Dr. Lemus at [facility]. According to the records, he typically dialyzes for 3.5 hrs per a RICHARD AVF. Nephrology consulted for management of ESRD and HD treatment.    Past Medical History:   Diagnosis Date    Amputation stump pain 9/10/2013    Aspiration pneumonia 7/27/2015    Asterixis 11/8/2016    C. difficile colitis 8/7/2015    Cholelithiasis without obstruction 8/25/2015    Chronic diastolic heart failure     2-23-17   1 - Low normal to mildly depressed left ventricular systolic function (EF 50-55%).    2 - Right ventricular enlargement with mildly depressed systolic function.    3 - Left ventricular diastolic dysfunction.    4 - Right atrial enlargement.    5 - Severe tricuspid regurgitation.    6 - Pulmonary hypertension. The estimated PA systolic pressure is 86 mmHg.    7 - Increased central venous pressure.    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    Metabolic acidosis, IAG, reduced excretion of inorganic acids     Myoclonic jerking 9/20/2016    Noncompliance with medication regimen 12/4/2018    Secondary hyperparathyroidism (of renal origin)     Secondary pulmonary hypertension 3/23/2017    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    TB lung, latent  08/25/2015    Negative Quantiferon Gold 3-23-17       Past Surgical History:   Procedure Laterality Date    COLONOSCOPY      COLONOSCOPY N/A 4/4/2017    Procedure: COLONOSCOPY;  Surgeon: Walker Stern MD;  Location: [facility] ([address] [place] [address]);  Service: Endoscopy;  Laterality: N/A;  PA Systolic Pressure 85.56. HD Patient MWF, K+ lab prior to procedure.     ESOPHAGOGASTRODUODENOSCOPY N/A 6/12/2018    Procedure: EGD (ESOPHAGOGASTRODUODENOSCOPY);  Surgeon: Man Galicia MD;  Location: [address]);  Service: Endoscopy;  Laterality: N/A;  EGD in 8-12 weeks with Dr. Galicia on 4th floor for follow up erosive esophagitis and Mejia's surveillance.    Patient should be on Pantoprazole 40mg every 12 hours or the equivulant of another PPI    awaiting for patient to reply back regarding changing    ESOPHAGOGASTRODUODENOSCOPY N/A 3/7/2019    Procedure: EGD (ESOPHAGOGASTRODUODENOSCOPY);  Surgeon: Man Galicia MD;  Location: [address]);  Service: Endoscopy;  Laterality: N/A;    ESOPHAGOGASTRODUODENOSCOPY N/A 9/23/2019    Procedure: EGD (ESOPHAGOGASTRODUODENOSCOPY);  Surgeon: Keanu Rainey MD;  Location: [address]);  Service: Endoscopy;  Laterality: N/A;    ESOPHAGOGASTRODUODENOSCOPY N/A 10/2/2019    Procedure: EGD (ESOPHAGOGASTRODUODENOSCOPY);  Surgeon: Kevin De La Paz MD;  Location: [address]);  Service: Endoscopy;  Laterality: N/A;    ESOPHAGOGASTRODUODENOSCOPY N/A 11/12/2019    Procedure: EGD (ESOPHAGOGASTRODUODENOSCOPY);  Surgeon: Kevin De La Paz MD;  Location: [address]);  Service: Endoscopy;  Laterality: N/A;    FOOT AMPUTATION THROUGH METATARSAL      left foot    LEG AMPUTATION THROUGH KNEE  12/18/2013    left BKA    R AVF  9/12/12    UPPER GASTROINTESTINAL ENDOSCOPY         Review of patient's allergies indicates:   Allergen Reactions    Fosrenol [lanthanum] Nausea And Vomiting     Nausea and vomiting     Current Facility-Administered Medications   Medication  Frequency    0.9%  NaCl infusion Once    acyclovir (ZOVIRAX) 320 mg in dextrose 5 % 50 mL IVPB Q24H    cefTRIAXone (ROCEPHIN) 2 g in dextrose 5 % 50 mL IVPB Q12H    chlorhexidine 0.12 % solution 15 mL BID    levETIRAcetam in NaCl (iso-os) IVPB 1,500 mg Once    pantoprazole injection 40 mg BID    sevelamer carbonate pwpk 1.6 g TID    sodium chloride 0.9% flush 10 mL PRN    vancomycin - pharmacy to dose pharmacy to manage frequency     Family History     Problem Relation (Age of Onset)    Alcohol abuse Maternal Grandmother    Diabetes Brother, Maternal Grandfather    Early death Mother    Heart disease Father    Hyperlipidemia Father    Hypertension Father, Sister    Kidney disease Father        Tobacco Use    Smoking status: Former Smoker     Packs/day: 1.00     Years: 10.00     Pack years: 10.00    Smokeless tobacco: Never Used   Substance and Sexual Activity    Alcohol use: No    Drug use: No    Sexual activity: Yes     Partners: Female     Birth control/protection: None     Review of Systems   Unable to perform ROS: Intubated     Objective:     Vital Signs (Most Recent):  Temp: 99.4 °F (37.4 °C) (01/21/20 1200)  Pulse: 99 (01/21/20 1400)  Resp: 19 (01/21/20 1400)  BP: (!) 156/88 (01/21/20 1400)  SpO2: 100 % (01/21/20 1400)  O2 Device (Oxygen Therapy): ventilator (01/21/20 1400) Vital Signs (24h Range):  Temp:  [97.9 °F (36.6 °C)-99.4 °F (37.4 °C)] 99.4 °F (37.4 °C)  Pulse:  [] 99  Resp:  [10-21] 19  SpO2:  [95 %-100 %] 100 %  BP: ()/() 156/88     Weight: 57 kg (125 lb 10.6 oz) (01/21/20 1300)  Body mass index is 20.28 kg/m².  Body surface area is 1.63 meters squared.    I/O last 3 completed shifts:  In: 1374.9 [I.V.:174.9; NG/GT:50; IV Piggyback:1150]  Out: 0     Physical Exam   Constitutional: He appears well-nourished. He appears ill. He is sedated and intubated.   HENT:   Head: Normocephalic and atraumatic.   Mouth/Throat: No oropharyngeal exudate.   Eyes: Right eye exhibits no  discharge. Left eye exhibits no discharge. No scleral icterus.   Neck: Normal range of motion. Neck supple. No JVD present.   Cardiovascular: Regular rhythm and normal heart sounds.   No murmur heard.  Pulmonary/Chest: He is intubated. He has rales (bilateral lung bases ).   Abdominal: Soft. Bowel sounds are normal. He exhibits no distension.   Musculoskeletal: Normal range of motion. He exhibits deformity. He exhibits no edema.   Left below knee amputation   Neurological:   Intubated/sedated   Skin: Skin is warm and dry.   Vitals reviewed.    Significant Labs:  CBC:   Recent Labs   Lab 01/21/20  1206   WBC 8.78   RBC 4.90   HGB 13.9*   HCT 43.7      MCV 89   MCH 28.4   MCHC 31.8*     CMP:   Recent Labs   Lab 01/21/20  0116   GLU 96   CALCIUM 9.6   ALBUMIN 3.2*   PROT 8.1      K 4.7   CO2 30*   CL 89*   BUN 66*   CREATININE 11.9*   ALKPHOS 260*   ALT 10   AST 24   BILITOT 0.5         Assessment/Plan:     * Seizure  - per primary team     ESRD on hemodialysis  The patient is a 55 yo AAM admitted to MICU with seizure activity after presenting to the ED on 1/20 after being found with alter mental status at his NH facility Monday morning. The patient is currently on continuous EEG monitoring. Last dialyze on MWF.     Nephrology History  iHD Schedule: MWF  Unit/MD: [facility]/ Dr. Lemus  Duration: 3.5 hrs  EDW: ?  Access: [facility] AVF   Resodial Renal Function: Yes (per records)    Plan:   - Will plan for HD today for metabolic clearance and volume management, target UF 2-3 L as tolerated, keep MAP >65. Not on pressor support.   - Will obtain outpatient records   - Recommend daily renal function panels   - Renal diet or Novasource TF when appropriate   - Hgb 13.9, within range, likely on JONAS therapy outpatient   - On mechanical ventilation with FiO at 30%  - Continue to monitor intake and output, daily weights   - Avoid nephrotoxic medication and renal dose medications to GFR  - Will discuss with Dr. Whitman        Thank you for your consult. I will follow-up with patient. Please contact us if you have any additional questions.    Glenis Benavidez DNP, FNP-C  Nephrology  [facility]

## 2020-01-21 NOTE — ASSESSMENT & PLAN NOTE
- patient normally MWF dialysis. Last full dialysis session done last Friday  - No need for urgent dialysis today, will get nephrology on board for dialysis once patient is stabilized

## 2020-01-21 NOTE — ASSESSMENT & PLAN NOTE
Altered mental status    Patient initially presented to ED prior to getting dialysis due to AMS and brown emesis. Had witnessed tonic clonic seizure requiring ativan shortly after getting a CT head. He was loaded with 1500 mg Keppra, intubated for airway protection, and started on propofol for sedation. Upon physical exam, patient remained unresponsive to verbal or tactile stimuli and had upward left sided gaze with sluggish pupils. Concern for status epilepticus. Differential diagnosis includes polysubstance, sepsis, intracranial abnormalities, and PRES.    - Spot EEG without evidence of current seizure. However patient already received keppra, ativan, and sedated on propofol. 24 hour EEG in progress, evidence of seizure activity. Per epilepsy, gave 1500 mg keppra. Keppra level ordered, will dose based on this.   - Substance induced: UDS negative, alcohol level wnl  - Sepsis: Lumbar puncture done, CSF not convincing for meningitis. Discontinue ampicillin. Continue vancomycin, rocephin, and acyclovir; likely discontinue over the next day. Follow up blood cultures, sputum culture+gram stain. Initial lactate 2.4, likely due to seizure event; repeat was 1.9. Patient received 500 ml saline in ED.   - Intracranial: patient with history of ICH with no functional deficits. Possibly multiple foci for seizure overtime. CT without acute changes, MRI brain with chronic changes and hypertensive angiopathy; no acute changes.  - PRES: Patient off cardene drip. MRI reviewed. Will follow blood pressure as patient is normally hypotensive.

## 2020-01-21 NOTE — ASSESSMENT & PLAN NOTE
- patient normally MWF dialysis. Last full dialysis session done last Friday  - will get nephrology on board for dialysis  - Investigate patient's chronic metabolic alkalosis

## 2020-01-21 NOTE — SUBJECTIVE & OBJECTIVE
Interval History/Significant Events: Patient had lumbar puncture overnight. Remained on propofol as there was some concern for seizure like activity when it was stopped.     Review of Systems   Unable to perform ROS: Intubated     Objective:     Vital Signs (Most Recent):  Temp: 99.4 °F (37.4 °C) (01/21/20 1200)  Pulse: 99 (01/21/20 1400)  Resp: 19 (01/21/20 1400)  BP: (!) 156/88 (01/21/20 1400)  SpO2: 100 % (01/21/20 1400) Vital Signs (24h Range):  Temp:  [97.9 °F (36.6 °C)-99.4 °F (37.4 °C)] 99.4 °F (37.4 °C)  Pulse:  [] 99  Resp:  [10-21] 19  SpO2:  [95 %-100 %] 100 %  BP: ()/() 156/88   Weight: 57 kg (125 lb 10.6 oz)  Body mass index is 20.28 kg/m².      Intake/Output Summary (Last 24 hours) at 1/21/2020 1414  Last data filed at 1/21/2020 1300  Gross per 24 hour   Intake 906.46 ml   Output 0 ml   Net 906.46 ml       Physical Exam   Constitutional: He appears well-developed and well-nourished.   HENT:   Head: Normocephalic and atraumatic.   Mouth/Throat: No oropharyngeal exudate.   Eyes: Conjunctivae are normal.   Pupils sluggish, upper left gaze   Neck: Normal range of motion. Neck supple. No JVD present.   Cardiovascular: Regular rhythm and normal heart sounds.   No murmur heard.  tachycardic   Pulmonary/Chest: He has rales (bilateral lung bases ).   Intubated   Abdominal: Soft. Bowel sounds are normal. He exhibits no distension.   Musculoskeletal: Normal range of motion. He exhibits edema.   Left below knee amputation   Neurological:   Patient not responsive to physical stimuli. However he still does not track with his eyes.    Skin: Skin is warm and dry.   Vitals reviewed.      Vents:  Vent Mode: A/C (01/21/20 1326)  Ventilator Initiated: Yes (01/20/20 1327)  Set Rate: 12 bmp (01/21/20 1326)  Vt Set: 340 mL (01/21/20 1326)  Pressure Support: 0 cmH20 (01/20/20 2000)  PEEP/CPAP: 5 cmH20 (01/21/20 1326)  Oxygen Concentration (%): 30 (01/21/20 1400)  Peak Airway Pressure: 21 cmH2O (01/21/20  1326)  Plateau Pressure: 0 cmH20 (01/21/20 1326)  Total Ve: 4.86 mL (01/21/20 1326)  F/VT Ratio<105 (RSBI): (!) 41.99 (01/21/20 1326)  Lines/Drains/Airways     Drain                 Hemodialysis AV Fistula Right upper arm -- days         Hemodialysis AV Fistula Right upper arm -- days         NG/OG Tube 01/20/20 1932 Sunray sump Right nostril less than 1 day          Airway                 Airway - Non-Surgical 01/20/20 1325 Endotracheal Tube-Hi/Lo 1 day          Peripheral Intravenous Line                 Peripheral IV - Single Lumen 01/20/20 0831 20 G Left Antecubital 1 day         Peripheral IV - Single Lumen 01/20/20 1320 20 G Left Other 1 day         Peripheral IV - Single Lumen 01/21/20 0348 20 G Anterior;Left Forearm less than 1 day              Significant Labs:    CBC/Anemia Profile:  Recent Labs   Lab 01/20/20  2312 01/21/20  0500 01/21/20  1206   WBC 7.58 7.35 8.78   HGB 13.6* 13.1* 13.9*   HCT 41.6 41.4 43.7    177 193   MCV 88 88 89   RDW 16.4* 16.1* 17.2*        Chemistries:  Recent Labs   Lab 01/20/20  0832 01/21/20  0116   * 143   K 4.9 4.7   CL 85* 89*   CO2 36* 30*   BUN 58* 66*   CREATININE 10.8* 11.9*   CALCIUM 10.1 9.6   ALBUMIN 3.7 3.2*   PROT 9.6* 8.1   BILITOT 0.5 0.5   ALKPHOS 333* 260*   ALT 12 10   AST 22 24   MG  --  2.1   PHOS  --  10.3*       All pertinent labs within the past 24 hours have been reviewed.    Significant Imaging:  I have reviewed all pertinent imaging results/findings within the past 24 hours.

## 2020-01-21 NOTE — ASSESSMENT & PLAN NOTE
- Last echo done at [facility] 8/2/19, EF>55%, bi-atrial enlargement  - Repeat echo ordered

## 2020-01-21 NOTE — ASSESSMENT & PLAN NOTE
GERD with esophagitis    Patient with reports of coffee ground emesis prior to transport to ED. In ED, dark brown contents suctioned from stomach. Hemoglobin remains stable at 15 and patient is hypertensive. Patient is well known to GI. His most recent scope was in November that just showed esophagitis similar to his previous EGD.     - He received 80 mg IV pantoprazole, will start 40 mg IV BID starting tomorrow  - Q6 cbc, transfuse as needed  - GI consulted and contacted. Appreciate recommendations

## 2020-01-21 NOTE — TREATMENT PLAN
[facility]  Gastroenterology Treatment Plan        Objective:     Vitals:    10/11/19 0622   BP: 110/60   Pulse:    Resp:    Temp:          Significant Labs:  Recent Labs   Lab 01/20/20  1754 01/20/20  2312 01/21/20  0500   HGB 13.8* 13.6* 13.1*       Lab Results   Component Value Date    WBC 7.35 01/21/2020    HGB 13.1 (L) 01/21/2020    HCT 41.4 01/21/2020    MCV 88 01/21/2020     01/21/2020       Lab Results   Component Value Date     01/21/2020    K 4.7 01/21/2020    CL 89 (L) 01/21/2020    CO2 30 (H) 01/21/2020    BUN 66 (H) 01/21/2020    CREATININE 11.9 (H) 01/21/2020    CALCIUM 9.6 01/21/2020    ANIONGAP 24 (H) 01/21/2020    ESTGFRAFRICA 4.9 (A) 01/21/2020    EGFRNONAA 4.2 (A) 01/21/2020       Lab Results   Component Value Date    ALT 10 01/21/2020    AST 24 01/21/2020    ALKPHOS 260 (H) 01/21/2020    BILITOT 0.5 01/21/2020       Lab Results   Component Value Date    INR 1.2 01/20/2020    INR 1.0 11/22/2019    INR 1.1 11/11/2019         Subjective:     Assessed at bedside. Examined NG tube suction cannister, no further signs of significant bleeding. No melena or BRBPR per nursing. Hb stable.       Assessment/Plan:       Plan:  Place 2 large bore IVs, volume resuscitation per primary  - Transfuse pRBC for Hb < 7 g/dL (Consider a higher Hb target if there is clinical evidence of intravascular volume depletion or comorbidities, such as CAD or if high suspicion of vigorous active ongoing bleeding or an uncorrected coagulopathy exists.).  - Correct coagulopathy (goal plt >50, INR <1.5) if present in patients without absolute contraindications.  - IV PPI 40 BID, switch to PO PPI 40mg BID once extubated and if tolerating PO  - Avoid nonsteroidal agents, antiplatelet agents and anticoagulants if possible in patients without absolute contraindications  - Correct fluid and electrolyte imbalances and nutritional deficiencies  - Continue to monitor  Refer above  - Once extubated and  tolerating PO:   Dietary management: 1. eating small, low-fat, low-fiber meals 4-5 times/day 2. copious non  carbonated fluid intake 3. May need referral to a dietitian   - Consider metoclopramide usual starting dose is 5 mg TID (30 minutes prior to meals and add  at bedtime PRN), titrated up as tolerated to maximum of 40 mg/day, with addition of evening dose  as indicated, Other prokinetic agents may include erythromycin.    - Can use other antiemetic medications such as phenothiazines, antihistamines, or Zofran alone  or in conjunction with prokinetic agents.    (Metoclopramide is FDA-approved for gastroparesis but has black box warning about risk  for tardive dyskinesia.)     We will sign off. Call if signs of re bleed, or significant drop in hb. Thank you for involving us in the care of Vaughn Retana. Please call with any additional questions, concerns or changes in the patient's clinical status.    Sotero Ojeda MD  Gastroenterology Fellow PGY IV   [facility]

## 2020-01-21 NOTE — HPI
"53 y/o male with a medical history of ESRD (MWF), left below the knee amputation (2/2 osteomyelitis), CHF, multiple ICH with extensive cerebral microbleeds (5/203- left BG, 9/2016 right basal ganglia hemorrhage, 11/2016  R striatocapsular ICH with IVH) and previous GI bleeds ( EGD 2019 shows esophagitis) presented to the ED on 1/20/20 from [facility] due to altered mental status. Neurology was consulted for possible seizure like activity that was witnessed on the table during CTH examination.     The morning of 1/20, when nursing staff members attempted to wake patient up for his scheduled dialysis they noticed that he was confused, lethargic and had coffee ground emesis in his trash can. Patient's last known normal was the night of 1/19/20. Per Sourav Stauffer (his nurse at [facility]) patient is alert and oriented to only person and place but not time. At baseline patient uses a walker. The nurse was unclear which side he has residual weakness.     On arrival to the ED here, patient had a CTH which revealed no acute process. There was a remote lacunar BG infarct. On the CT table patient was witnessed to have a "tonic-clonic seizure" after one episode of coffee ground emesis. SBP at that time was elevated in the 200s. Patient was emergently intubated by MICU and loaded with 2 mg ativan, and 1500 mg of keppra. Spot EEG revealed generalized background suppression with moderate to severe encephalopathy however, no epileptiform discharges noted. Please note that EEG was placed and read after the patient received midazolam, ativan, keppra and propofol. MRI brain w/o contrast revealed no acute intracranial abnormalities.Patient was empirically started on antibiotics for meningitis. Lumbar puncture revealed traumatic tab with slight increase in proteins and 2 WBC.    "

## 2020-01-21 NOTE — CONSULTS
"  [facility]  Adult Nutrition  Consult Note    SUMMARY     Recommendations    1. If/when medically feasible, initiate enteral nutrition. Rec'd Novasource @ 30 mL/hr to provide 1440 kcals, 65 g of protein, 516 mL fluid.   2. If able to extubate & advance diet, recommend Renal diet (texture per SLP). Add Novasource ONS to aid in caloric intake.  3. RD to monitor & follow-up.    Goals: Meet % EEN, EPN  Nutrition Goal Status: new  Communication of RD Recs: reviewed with RN    Reason for Assessment    Reason For Assessment: consult  Diagnosis: other (see comments)(Seizures)  Relevant Medical History: HTN, ESRD on HD, L. BKA  Interdisciplinary Rounds: did not attend    General Information Comments: Pt intubated, sedated, NGT present to suction. Information obtained from family member at bedside. Pt presents from NH. Family member states she is unsure of pt's PO intake PTA. She states x 6 months ago, pt weighed ~ 140-150# (-14%). NFPE complete - pt meets criteria for severe malnutrition. Please see PES statment for details.  Nutrition Discharge Planning: Unable to determine    Nutrition/Diet History    Factors Affecting Nutritional Intake: NPO, on mechanical ventilation    Anthropometrics    Temp: 99.4 °F (37.4 °C)  Height: 5' 6" (167.6 cm)  Height (inches): 66 in  Weight Method: Stated  Weight: 57 kg (125 lb 10.6 oz)  Weight (lb): 125.66 lb  Ideal Body Weight (IBW), Male: 142 lb  % Ideal Body Weight, Male (lb): 88.49 %  BMI (Calculated): 20.3  BMI Grade: 18.5-24.9 - normal  Usual Body Weight (UBW), k kg  % Usual Body Weight: 86.54  % Weight Change From Usual Weight: -13.64 %  Amputation %: 5.9  Total Amputation %: 5.9  Amputation Ideal Body Weight (IBW), Male (lb): 136.1 lb  Amputee BMI (kg/m2): 21.61 kg/m2    Lab/Procedures/Meds    Pertinent Labs Reviewed: reviewed  Pertinent Labs Comments: BUN 66, Creat 4.9, GFR 4.9, P 10.3  Pertinent Medications Reviewed: reviewed  Pertinent Medications " Comments: Nicardipine, Propofol, Levophed    Estimated/Assessed Needs    Weight Used For Calorie Calculations: 57 kg (125 lb 10.6 oz)     Energy Calorie Requirements (kcal): 1481 kcal/d  [facility]     Protein Requirements: 69-86 g/d (1.2-1.5 g/kg)   Weight Used For Protein Calculations: 57 kg (125 lb 10.6 oz)     Estimated Fluid Requirement Method: other (see comments)(Per MD or 1 mL/kcal)    Nutrition Prescription Ordered    Current Diet Order: NPO    Evaluation of Received Nutrient/Fluid Intake    Other Calories (kcal): 172 (Propofol)    Comments: LBM: not recorded    Nutrition Risk    Level of Risk/Frequency of Follow-up: (2x/week)     Assessment and Plan    Severe malnutrition    Nutrition Problem:  Severe Protein-Calorie Malnutrition  Malnutrition in the context of Chronic Illness/Injury    Related to (etiology):  Inadequate energy intake    Signs and Symptoms (as evidenced by):  Body Fat Depletion: severe depletion of orbitals and triceps   Muscle Mass Depletion: severe depletion of temples, clavicle region and interosseous muscle   Weight Loss: 14% x 6 months     Interventions(treatment strategy):  Collaboration of nutrition care w/ other providers    Nutrition Diagnosis Status:  New     Monitor and Evaluation    Food and Nutrient Intake: energy intake, food and beverage intake, enteral nutrition intake  Food and Nutrient Adminstration: diet order, enteral and parenteral nutrition administration  Physical Activity and Function: nutrition-related ADLs and IADLs  Anthropometric Measurements: weight, weight change  Biochemical Data, Medical Tests and Procedures: inflammatory profile, lipid profile, glucose/endocrine profile, gastrointestinal profile, electrolyte and renal panel  Nutrition-Focused Physical Findings: overall appearance     Malnutrition Assessment    Weight Loss (Malnutrition): greater than 10% in 6 months  Energy Intake (Malnutrition): other (see comments)(GONZALO)  Subcutaneous  Fat (Malnutrition): severe depletion  Muscle Mass (Malnutrition): severe depletion   Orbital Region (Subcutaneous Fat Loss): severe depletion  Upper Arm Region (Subcutaneous Fat Loss): severe depletion   [place] Region (Muscle Loss): severe depletion  Clavicle Bone Region (Muscle Loss): severe depletion  Scapular Bone Region (Muscle Loss): severe depletion  Dorsal Hand (Muscle Loss): severe depletion  Anterior Thigh Region (Muscle Loss): (GONZALO)  Posterior Calf Region (Muscle Loss): (GONZALO)     Nutrition Follow-Up    RD Follow-up?: Yes

## 2020-01-21 NOTE — PLAN OF CARE
CM met with patient at the bedside to discuss D/C POC needs. Patient AAO x's ? and unable to verify demographics in the chart are correct. Information verified in old medical record and by [facility].  Patient is a halfway patient at [facility].  CM name and contact number listed on the patient's white board.  CM provided explanation of discharge plan process. CM left blue folder at the bedside with explanation of qualification for placement and facility resources. Patient expressed understanding. CM remains available for any further patient needs or concerns.       Cori Randall MD  [address] [facility] [address]      [id number] - [place], [place] - [address]  [address]  [place] [address]  Phone: [phone] Fax: [phone]    [facility]  [address] [address]  Phone: [phone] Fax: [phone]    [id number] [place], [place] - [address]  [address] [facility] [address]  Phone: [phone] Fax: [phone]      Extended Emergency Contact Information  Primary Emergency Contact: Alicia Retana   [place]  Mobile Phone: [phone]  Relation: Sister  Secondary Emergency Contact: Daisy Longoria   [facility]  Home Phone: [phone]  Mobile Phone: [phone]  Relation: Relative    Future Appointments   Date Time Provider Department Center   2/13/2020 10:00 AM Alyson Gonzales NP [facility]            01/21/20 1449   Discharge Assessment   Assessment Type Discharge Planning Assessment   Confirmed/corrected address and phone number on facesheet? Yes   Assessment information obtained from? Medical Record   Prior to hospitilization cognitive status: Alert/Oriented   Prior to hospitalization functional status: Wheelchair Bound   Current cognitive status: Not Oriented to Place;Not Oriented to Time;Not Oriented  to Person   Current Functional Status: Completely Dependent;Needs Assistance   Facility Arrived From: [facility]   Lives With facility resident   Able to Return to Prior Arrangements yes   Is patient able to care for self after discharge? Unable to determine at this time (comments)   Who are your caregiver(s) and their phone number(s)? Alicia Retana, sister [phone]   Patient currently being followed by outpatient case management? No   Patient currently receives any other outside agency services? No   Equipment Currently Used at Home other (see comments)  (per nursing home)   Do you have any problems affording any of your prescribed medications? No   Is the patient taking medications as prescribed? yes   Does the patient have transportation home? Yes   Transportation Anticipated agency   Dialysis Name and Scheduled days [facility]   Does the patient receive services at the [facility]mad[facility] Clinic? No   Discharge Plan A Return to nursing home   DME Needed Upon Discharge  none       Mitch Das RN MSN  Critical Care-   Ext. [phone]

## 2020-01-21 NOTE — ASSESSMENT & PLAN NOTE
51 y/o male with a medical history of ESRD (MWF), left below the knee amputation (2/2 osteomyelitis), CHF, multiple ICH with extensive cerebral microbleeds (5/203- left BG, 9/2016 right basal ganglia hemorrhage, 11/2016  R striatocapsular ICH with IVH) and previous GI bleeds ( EGD 2019 shows esophagitis) presented to the ED on 1/20/20 from [facility] due to altered mental status. Neurology was consulted for possible seizure like activity that was witnessed on the table during CTH examination. Unclear of seizure semiology. Mentions GTC however, primary team nor nurse unable to tell me what the seizure looked like. Patient was then emergently intubated as there was concern for status. Patient was then loaded with 2 mg ativan, 1500 mg of keppra and 1 mg of midazolam. He was then started on propofol. Spot EEG after the initiation of these medications reveal generealized background suppression with moderate to severe encephalopathy. There were no epileptiform discharges noted. Patient empirically started on antibiotics for meningitis however, CSF studies thus far unrevealing for infection. Please note on arrival blood pressure was 217/132. BUN/Cr elevated to 58/10.8. Patient missed his dialysis yesterday. MRI unrevealing for any acute processes     Differential Diagnosis   Cause for his encephalopathy includes hypertensive encephalopathy versus uremic encephalopathy. Unclear if patient was having seizures when he initially came in as he was loaded with ativan, midazolam and keppra and then started on propofol. MRI brain however, was unrevealing for any acute processes. Per epilepsy attending note, after EEG was placed on patient (after he was off of the propofol), underlying structural lesion on the left, with underlying epileptiform potential on the left. MICU team then loaded the patient with 1500 mg.     Recommendations  1) Continue EEG off of propofol  2) Can consider discontinuing antibiotics for  meningitis as lumbar puncture studies does not reveal and infectious etiology  3) Continue blood pressure control   4) Please dialyze patient to decrease chances of uremic encephalopathy

## 2020-01-21 NOTE — HPI
The patient is a 54 year-old AAM with a history of ESRD (MWF dialysis), L BKA 2/2 osteo, CHF, GERD, multiple ICH without residual deficits, gastroparesis, esophagitis, and numerous episodes of hematemesis and GIBs (last EGD 11/12/19, esophagitis) who presented to the ED from [facility] on 1/20 due to altered mental status. The patient is currently intubated, therefore, HPI will come from Epic charting. According to the notes, the patient was transferred here after being found Monday morning (1/20) confused and lethargic with a moderate amount of brown, colored emesis at bedside by the nursing home staff. The nursing staff reported that he appeared normal the day before. In the ED, initial work up was found to be unremarkable. UA negative for UTI. Lactic acid was mildly elevated at 2.4. Alcohol and drug screen negative. The patient underwent a CTH, which showed no acute intraparenchymal hemorrhage or major vascular distribution, senescent changes, and remote lacunar type basal ganglia infarct. However, after the CT, the patient had a episode of emesis (?coffee ground) and a witnessed tonic clonic seizure. He was also noted to be hypertensive (SBP >200s). The patient was intubated for airway protection and started on propofol and Cardene drip for hypertension (now off). He was also given Ativan and Keppra in the ED. He is now on a continuous EEG. GI was consulted for a UGI bleed. The patient's Hgb is currently 13.9. GI with no plans for endoscopic interventions at this time.     The patient was last dialyze on Friday (1/17) at his outpatient dialysis unit. He is a MWF dialysis patient of Dr. Lemus at [facility]. According to the records, he typically dialyzes for 3.5 hrs per a RICHARD AVF. Nephrology consulted for management of ESRD and HD treatment.

## 2020-01-21 NOTE — ASSESSMENT & PLAN NOTE
The patient is a 55 yo AAM admitted to MICU with seizure activity after presenting to the ED on 1/20 after being found with alter mental status at his NH facility Monday morning. The patient is currently on continuous EEG monitoring. Last dialyze on MWF.     Nephrology History  iHD Schedule: MWF  Unit/MD: [facility]/ Dr. Lemus  Duration: 3.5 hrs  EDW: ?  Access: RICHARD AVF   Resodial Renal Function: Yes (per records)    Plan:   - Will plan for HD today for metabolic clearance and volume management, target UF 2-3 L as tolerated, keep MAP >65. Not on pressor support.   - Will obtain outpatient records   - Recommend daily renal function panels   - Renal diet or Novasource TF when appropriate   - On mechanical ventilation with FiO at 30%  - Continue to monitor intake and output, daily weights   - Avoid nephrotoxic medication and renal dose medications to GFR  - Will discuss with Dr. Whitman

## 2020-01-21 NOTE — ASSESSMENT & PLAN NOTE
GERD with esophagitis    Patient with reports of coffee ground emesis prior to transport to ED. In ED, dark brown contents suctioned from stomach. Hemoglobin remains stable at 15 and patient is hypertensive. Patient is well known to GI. His most recent scope was in November that just showed esophagitis similar to his previous EGD.     - Will obtain 2 large bore IV  - He received 80 mg IV pantoprazole, will start 40 mg IV BID starting tomorrow  - Q6 cbc for now, will change pending Hg stability  - GI consulted and contacted. Not urgent to scope at this time

## 2020-01-21 NOTE — CARE UPDATE
Rapid Response Nurse Chart Check     Chart check completed, abnormal VS noted. Patient accepted to and being followed by critical care medicine. Patient to be admitted to [facility]. Please call [phone] for further concerns or assistance.

## 2020-01-21 NOTE — ED NOTES
Assumed care of this patient. Pt resting comfortably in stretcher with bed locked in low position with side rails up x2, pt remains intubated. Respirations even and unlabored with visible chest rise noted.Pt family member updated on POC, verbalizes understanding . Pt on continuous cardiac monitoring, BP and O2 monitoring. Will continue to monitor.    Patient Identifiers for Vaughn Retana checked and correct  LOC: The patient is sedated and intubated, resting comfortably  APPEARANCE: Patient resting comfortably and in no acute distress, patient is clean and well groomed, patient's clothing is properly fastened.  SKIN: The skin is warm and dry, patient has normal skin turgor and moist mucus membranes,no rashes or lesions.Skin Intact , No Breakdown Noted. AV fistula to RUE  Musculoskeletal :  Normal range of motion noted. Moves all extremeties well, No swelling or tenderness noted  RESPIRATORY: Airway is open and patent, respirations are spontaneous, patient has a normal effort and rate.  CARDIAC: Patient has a normal rate and rhythm, no periphreal edema noted, capillary refill < 3 seconds.   ABDOMEN: Soft and non tender to palpation, no distention noted.   PULSES: 2+  And symmetrical in all extremeties  NEUROLOGIC: Pt sedated and intubated  Will continue to monitor

## 2020-01-21 NOTE — PLAN OF CARE
Recommendations     1. If/when medically feasible, initiate enteral nutrition. Rec'd Novasource @ 30 mL/hr to provide 1440 kcals, 65 g of protein, 516 mL fluid.   2. If able to extubate & advance diet, recommend Renal diet (texture per SLP). Add Novasource ONS to aid in caloric intake.  3. RD to monitor & follow-up.

## 2020-01-21 NOTE — HOSPITAL COURSE
Patient transferred back to ICU evening of 2/13 following recurrence of hematemesis and UGIB. No further episodes of coffee ground emesis since transfer to ICU. Down-trend in hgb noted (12.5 > 11.9 > 10.6), however patient remained hemodynamically stable. EGD by GI with non-bleeding erosive gastropathy and a hiatal hernia that was biopsied. Hgb remained stable. Tube feeds restarted. Stable for step down from ICU.

## 2020-01-21 NOTE — CARE UPDATE
Pt was received intubated from ED and was placed on mechanical ventilation with the documented settings. Will continue to monitor.

## 2020-01-21 NOTE — ASSESSMENT & PLAN NOTE
- Last echo done at [facility] 8/2/19, EF>55%, bi-atrial enlargement  - Repeat echo ordered

## 2020-01-21 NOTE — PROGRESS NOTES
HD initiated, cannulated upper right arm AVF with 15 gauge needles X 2 . Good blood flow noted. /. Ut net goal set for 3 liters as tolerated. . Patient sedated and on vent. B/P 147/93 pulse 101, o2 sat 94

## 2020-01-21 NOTE — SUBJECTIVE & OBJECTIVE
Past Medical History:   Diagnosis Date    Amputation stump pain 9/10/2013    Aspiration pneumonia 7/27/2015    Asterixis 11/8/2016    C. difficile colitis 8/7/2015    Cholelithiasis without obstruction 8/25/2015    Chronic diastolic heart failure     2-23-17   1 - Low normal to mildly depressed left ventricular systolic function (EF 50-55%).    2 - Right ventricular enlargement with mildly depressed systolic function.    3 - Left ventricular diastolic dysfunction.    4 - Right atrial enlargement.    5 - Severe tricuspid regurgitation.    6 - Pulmonary hypertension. The estimated PA systolic pressure is 86 mmHg.    7 - Increased central venous pressure.    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    Metabolic acidosis, IAG, reduced excretion of inorganic acids     Myoclonic jerking 9/20/2016    Noncompliance with medication regimen 12/4/2018    Secondary hyperparathyroidism (of renal origin)     Secondary pulmonary hypertension 3/23/2017    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 HD Patient MWF, K+ lab  prior to procedure.     ESOPHAGOGASTRODUODENOSCOPY N/A 6/12/2018    Procedure: EGD (ESOPHAGOGASTRODUODENOSCOPY);  Surgeon: Man Galicia MD;  Location: [facility] (2ND FLR);  Service: Endoscopy;  Laterality: N/A;  EGD in 8-12 weeks with Dr. Galicia on 4th floor for follow up erosive esophagitis and Mejia's surveillance.    Patient should be on Pantoprazole 40mg every 12 hours or the equivulant of another PPI    awaiting for patient to reply back regarding changing    ESOPHAGOGASTRODUODENOSCOPY N/A 3/7/2019    Procedure: EGD (ESOPHAGOGASTRODUODENOSCOPY);  Surgeon: Man Galicia MD;  Location: [facility] (2ND FLR);  Service: Endoscopy;  Laterality: N/A;    ESOPHAGOGASTRODUODENOSCOPY N/A 9/23/2019    Procedure: EGD (ESOPHAGOGASTRODUODENOSCOPY);  Surgeon: Keanu Rainey MD;  Location: [facility] (2ND FLR);  Service: Endoscopy;  Laterality: N/A;    ESOPHAGOGASTRODUODENOSCOPY N/A 10/2/2019    Procedure: EGD (ESOPHAGOGASTRODUODENOSCOPY);  Surgeon: Kevin De La Paz MD;  Location: [facility] (2ND FLR);  Service: Endoscopy;  Laterality: N/A;    ESOPHAGOGASTRODUODENOSCOPY N/A 11/12/2019    Procedure: EGD (ESOPHAGOGASTRODUODENOSCOPY);  Surgeon: Kevin De La Paz MD;  [facility]  Service: Endoscopy;  Laterality: N/A;    FOOT AMPUTATION THROUGH METATARSAL      left foot    LEG AMPUTATION THROUGH KNEE  12/18/2013    left BKA    R AVF  9/12/12    UPPER GASTROINTESTINAL ENDOSCOPY         Review of patient's allergies indicates:   Allergen Reactions    Fosrenol [lanthanum] Nausea And Vomiting     Nausea and vomiting       Current Neurological Medications:     No current facility-administered medications on file prior to encounter.      Current Outpatient Medications on File Prior to Encounter   Medication Sig    acetaminophen (TYLENOL) 325 MG tablet Take 2 tablets (650 mg total) by mouth every 8 (eight) hours as needed.    carvedilol (COREG) 3.125 MG tablet Take 1 tablet (3.125 mg total) by mouth  2 (two) times daily with meals.    metoclopramide HCl (REGLAN) 10 MG tablet Take 1 tablet (10 mg total) by mouth 3 (three) times daily before meals.    midodrine (PROAMATINE) 5 MG Tab Please take 30 min before dialysis on Monday Wednesday and Friday    ondansetron (ZOFRAN) 8 MG tablet Take 1 tablet (8 mg total) by mouth every 12 (twelve) hours as needed for Nausea.    pantoprazole (PROTONIX) 40 MG tablet Take 1 tablet (40 mg total) by mouth 2 (two) times daily.    promethazine (PHENERGAN) 25 MG tablet Take 1 tablet (25 mg total) by mouth every 6 (six) hours as needed for Nausea.    RENAPLEX-D 800 mcg-12.5 mg -2,000 unit Tab Take 1 tablet by mouth once daily.    sevelamer carbonate (RENVELA) 800 mg Tab Take 1 tablet (800 mg total) by mouth 3 (three) times daily with meals.    sucralfate (CARAFATE) 100 mg/mL suspension Take 10 mLs (1 g total) by mouth 4 (four) times daily before meals and nightly.     Family History     Problem Relation (Age of Onset)    Alcohol abuse Maternal Grandmother    Diabetes Brother, Maternal Grandfather    Early death Mother    Heart disease Father    Hyperlipidemia Father    Hypertension Father, Sister    Kidney disease Father        Tobacco Use    Smoking status: Former Smoker     Packs/day: 1.00     Years: 10.00     Pack years: 10.00    Smokeless tobacco: Never Used   Substance and Sexual Activity    Alcohol use: No    Drug use: No    Sexual activity: Yes     Partners: Female     Birth control/protection: None     Review of Systems   Unable to perform ROS: Intubated   Constitutional: Negative for fever.   Skin: Positive for rash.     Objective:     Vital Signs (Most Recent):  Temp: 98.6 °F (37 °C) (01/21/20 1630)  Pulse: 101 (01/21/20 1645)  Resp: 17 (01/21/20 1645)  BP: (!) 137/91 (01/21/20 1645)  SpO2: (!) 94 % (01/21/20 1645) Vital Signs (24h Range):  Temp:  [97.9 °F (36.6 °C)-99.4 °F (37.4 °C)] 98.6 °F (37 °C)  Pulse:  [] 101  Resp:  [10-21] 17  SpO2:  [94 %-100 %]  94 %  BP: ()/(52-93) 137/91     Weight: 57 kg (125 lb 10.6 oz)  Body mass index is 20.28 kg/m².    Physical Exam   Neurological:   Reflex Scores:       Tricep reflexes are 1+ on the right side and 1+ on the left side.       Bicep reflexes are 1+ on the right side and 1+ on the left side.       Brachioradialis reflexes are 1+ on the right side and 1+ on the left side.       Patellar reflexes are 1+ on the right side and 1+ on the left side.       Achilles reflexes are 1+ on the right side and 1+ on the left side.      NEUROLOGICAL EXAMINATION:     MENTAL STATUS        Examination was done after propofol was off for 20 minutes  -Patient was responsive to painful stimuli however, not to verbal stimuli.   -Around 4:30 PM when patient was off of propofol (for a couple of hours), he was responsive to verbal stimuli  -Does not follow any commands        CRANIAL NERVES        -Cough and gag reflex present  - Corneal reflex no present (possibly due to sedation)      MOTOR EXAM   Muscle bulk: decreased  Overall muscle tone: decreased  Right arm tone: normal  Left arm tone: normal  Right leg tone: normal  Left leg tone: normal       -Withdraws to pain on upper extremities   -L BKA      REFLEXES     Reflexes   Right brachioradialis: 1+  Left brachioradialis: 1+  Right biceps: 1+  Left biceps: 1+  Right triceps: 1+  Left triceps: 1+  Right patellar: 1+  Left patellar: 1+  Right achilles: 1+  Left achilles: 1+  Right : 1+  Left : 1+       RUE fistula      SENSORY EXAM        Could not test      GAIT AND COORDINATION        Could not test        Significant Labs:   BMP:   Recent Labs   Lab 01/20/20  0832 01/21/20  0116   * 96   * 143   K 4.9 4.7   CL 85* 89*   CO2 36* 30*   BUN 58* 66*   CREATININE 10.8* 11.9*   CALCIUM 10.1 9.6   MG  --  2.1     CBC:   Recent Labs   Lab 01/20/20  2312 01/21/20  0500 01/21/20  1206   WBC 7.58 7.35 8.78   HGB 13.6* 13.1* 13.9*   HCT 41.6 41.4 43.7    177 193      CMP:   Recent Labs   Lab 01/20/20  0832 01/21/20  0116   * 96   * 143   K 4.9 4.7   CL 85* 89*   CO2 36* 30*   BUN 58* 66*   CREATININE 10.8* 11.9*   CALCIUM 10.1 9.6   MG  --  2.1   PROT 9.6* 8.1   ALBUMIN 3.7 3.2*   BILITOT 0.5 0.5   ALKPHOS 333* 260*   AST 22 24   ALT 12 10   ANIONGAP 26* 24*   EGFRNONAA 4.8* 4.2*       Significant Imaging: I have reviewed all pertinent imaging results/findings within the past 24 hours.

## 2020-01-21 NOTE — PROGRESS NOTES
[facility]  Critical Care Medicine  Progress Note    Patient Name: Vaughn Retana  MRN: [id number]  Admission Date: 1/20/2020  Hospital Length of Stay: 1 days  Code Status: Full Code  Attending Provider: Hero Burton*  Primary Care Provider: Primary Doctor No   Principal Problem: Seizure    Subjective:     HPI:  Patient is a 54 year old male with extensive medical history including ESRD on dialysis MWF (has not received it today), L below knee amputation 2/2 osteomyelitis, dCHF (last echo 8/2/19 [facility]), GERD, h/o multiple ICH w/o residual deficits, and multiple instances of GI bleed (last EGD 11/12/19, esophagitis) who presents to ED [facility] nursing home due to altered mental status. From NH report, nursing home staff went to wake the patient for his morning dialysis. He was confused, lethargic, had a moderate amount of brown colored emesis in his trash can. Patient last got dialysis on Friday. Patient is normally able to ambulate on his own and is alert and oriented; nursing staff reports that he appeared normal yesterday.       At the time of ICU consult, initial work up showed largely unremarkable cbc with no leukocytosis. CMP significant for a bicarb of 36; patient is ESRD with creatinine of 10.8. Patient still makes some urine, and UA was without evidence for UTI. Lactic acid acid was mildly elevated at 2.4 and troponin mildly elevated at .348->.339. INR is at 1.2. Alcohol and drug screen negative. Patient was given versed prior to undergoing CT scan; which showed no acute intraparenchymal hemorrhage or major vascular distribution, senescent changes, and remote lacunar type basal ganglia infarct. Shortly after CT, patient had a witnessed tonic clonic seizure. He received 2 mg of ativan and 1500 of keppra. Patient became non-responsive afterwards and was severely hypertensive with systolic bp in the 200's. Patient was intubated for airway protection and started on propofol  and Cardene drip for hypertension. Family updated over the phone and at bedside.                Hospital/ICU Course:  Patient admitted to critical care medicine on 1/20 with concerns of new onset seizures and s/p intubation for airway protection. Patient was started on vanc, rocephin, ampicillin, and acyclovir to cover for meningitis. Patient had spot EEG while on propofol in ED which did not show seizure activity. MRi done showed chronic changes but no acute abnormalities. Patient was taken off cardene drip soon after he arrived in ICU. Lumbar puncture was done after MRI. CSF labs were not convincing for bacterial or viral meningitis; although cultures are pending. Continuous EEG was done after propofol was stopped which showed seizure activity. Per epilepsy, patient was given another dose of Keppra, will follow up after checking levels as patient is ESRD. Nephrology consulted as patient last had HD last Friday.      Interval History/Significant Events: Patient had lumbar puncture overnight. Remained on propofol as there was some concern for seizure like activity when it was stopped.     Review of Systems   Unable to perform ROS: Intubated     Objective:     Vital Signs (Most Recent):  Temp: 99.4 °F (37.4 °C) (01/21/20 1200)  Pulse: 99 (01/21/20 1400)  Resp: 19 (01/21/20 1400)  BP: (!) 156/88 (01/21/20 1400)  SpO2: 100 % (01/21/20 1400) Vital Signs (24h Range):  Temp:  [97.9 °F (36.6 °C)-99.4 °F (37.4 °C)] 99.4 °F (37.4 °C)  Pulse:  [] 99  Resp:  [10-21] 19  SpO2:  [95 %-100 %] 100 %  BP: ()/() 156/88   Weight: 57 kg (125 lb 10.6 oz)  Body mass index is 20.28 kg/m².      Intake/Output Summary (Last 24 hours) at 1/21/2020 1414  Last data filed at 1/21/2020 1300  Gross per 24 hour   Intake 906.46 ml   Output 0 ml   Net 906.46 ml       Physical Exam   Constitutional: He appears well-developed and well-nourished.   HENT:   Head: Normocephalic and atraumatic.   Mouth/Throat: No oropharyngeal exudate.    Eyes: Conjunctivae are normal.   Pupils sluggish, upper left gaze   Neck: Normal range of motion. Neck supple. No JVD present.   Cardiovascular: Regular rhythm and normal heart sounds.   No murmur heard.  tachycardic   Pulmonary/Chest: He has rales (bilateral lung bases ).   Intubated   Abdominal: Soft. Bowel sounds are normal. He exhibits no distension.   Musculoskeletal: Normal range of motion. He exhibits edema.   Left below knee amputation   Neurological:   Patient not responsive to physical stimuli. However he still does not track with his eyes.    Skin: Skin is warm and dry.   Vitals reviewed.      Vents:  Vent Mode: A/C (01/21/20 1326)  Ventilator Initiated: Yes (01/20/20 1327)  Set Rate: 12 bmp (01/21/20 1326)  Vt Set: 340 mL (01/21/20 1326)  Pressure Support: 0 cmH20 (01/20/20 2000)  PEEP/CPAP: 5 cmH20 (01/21/20 1326)  Oxygen Concentration (%): 30 (01/21/20 1400)  Peak Airway Pressure: 21 cmH2O (01/21/20 1326)  Plateau Pressure: 0 cmH20 (01/21/20 1326)  Total Ve: 4.86 mL (01/21/20 1326)  F/VT Ratio<105 (RSBI): (!) 41.99 (01/21/20 1326)  Lines/Drains/Airways     Drain                 Hemodialysis AV Fistula Right upper arm -- days         Hemodialysis AV Fistula Right upper arm -- days         NG/OG Tube 01/20/20 1932 Nuckolls sump Right nostril less than 1 day          Airway                 Airway - Non-Surgical 01/20/20 1325 Endotracheal Tube-Hi/Lo 1 day          Peripheral Intravenous Line                 Peripheral IV - Single Lumen 01/20/20 0831 20 G Left Antecubital 1 day         Peripheral IV - Single Lumen 01/20/20 1320 20 G Left Other 1 day         Peripheral IV - Single Lumen 01/21/20 0348 20 G Anterior;Left Forearm less than 1 day              Significant Labs:    CBC/Anemia Profile:  Recent Labs   Lab 01/20/20  2312 01/21/20  0500 01/21/20  1206   WBC 7.58 7.35 8.78   HGB 13.6* 13.1* 13.9*   HCT 41.6 41.4 43.7    177 193   MCV 88 88 89   RDW 16.4* 16.1* 17.2*        Chemistries:  Recent  Labs   Lab 01/20/20  0832 01/21/20  0116   * 143   K 4.9 4.7   CL 85* 89*   CO2 36* 30*   BUN 58* 66*   CREATININE 10.8* 11.9*   CALCIUM 10.1 9.6   ALBUMIN 3.7 3.2*   PROT 9.6* 8.1   BILITOT 0.5 0.5   ALKPHOS 333* 260*   ALT 12 10   AST 22 24   MG  --  2.1   PHOS  --  10.3*       All pertinent labs within the past 24 hours have been reviewed.    Significant Imaging:  I have reviewed all pertinent imaging results/findings within the past 24 hours.      ABG  Recent Labs   Lab 01/21/20  0532   PH 7.447   PO2 56   PCO2 52.4*   HCO3 36.2*   BE 12     Assessment/Plan:     Neuro  * Seizure  Altered mental status    Patient initially presented to ED prior to getting dialysis due to AMS and brown emesis. Had witnessed tonic clonic seizure requiring ativan shortly after getting a CT head. He was loaded with 1500 mg Keppra, intubated for airway protection, and started on propofol for sedation. Upon physical exam, patient remained unresponsive to verbal or tactile stimuli and had upward left sided gaze with sluggish pupils. Concern for status epilepticus. Differential diagnosis includes polysubstance, sepsis, intracranial abnormalities, and PRES.    - Spot EEG without evidence of current seizure. However patient already received keppra, ativan, and sedated on propofol. 24 hour EEG in progress, evidence of seizure activity. Per epilepsy, gave 1500 mg keppra. Keppra level ordered, will dose based on this.   - Substance induced: UDS negative, alcohol level wnl  - Sepsis: Lumbar puncture done, CSF not convincing for meningitis. Discontinue ampicillin. Continue vancomycin, rocephin, and acyclovir; likely discontinue over the next day. Follow up blood cultures, sputum culture+gram stain. Initial lactate 2.4, likely due to seizure event; repeat was 1.9. Patient received 500 ml saline in ED.   - Intracranial: patient with history of ICH with no functional deficits. Possibly multiple foci for seizure overtime. CT without acute  changes, MRI brain with chronic changes and hypertensive angiopathy; no acute changes.  - PRES: Patient off cardene drip. MRI reviewed. Will follow blood pressure as patient is normally hypotensive.        Cardiac/Vascular  Renovascular hypertension  - Patient on coreg at nursing home; has been compliant as given by nursing home  - Patient with hypertensive emergency on admit  - Started Cardene drip, titrate to BP of 160-180   - Follow up MRI brain, concern for PRES    Chronic diastolic heart failure  - Last echo done at [facility] 8/2/19, EF>55%, bi-atrial enlargement  - Repeat echo ordered    Renal/  ESRD on hemodialysis  - patient normally MWF dialysis. Last full dialysis session done last Friday  - will get nephrology on board for dialysis  - Investigate patient's chronic metabolic alkalosis     Endocrine  Severe malnutrition  - Nutrition consulted  - Will start enteral nutrition. Rec'd Novasource @ 30 mL/hr to provide 1440 kcals, 65 g of protein, 516 mL fluid.     GI  Gastrointestinal hemorrhage with hematemesis  GERD with esophagitis    Patient with reports of coffee ground emesis prior to transport to ED. In ED, dark brown contents suctioned from stomach. Hemoglobin remains stable at 15 and patient is hypertensive. Patient is well known to GI. His most recent scope was in November that just showed esophagitis similar to his previous EGD.     - He received 80 mg IV pantoprazole, will start 40 mg IV BID starting tomorrow  - Q6 cbc, transfuse as needed  - GI consulted and contacted. Appreciate recommendations     Critical Care Daily Checklist:    A: Awake: RASS Goal/Actual Goal: RASS Goal: 0-->alert and calm  Actual: Maddox Agitation Sedation Scale (RASS): Deep sedation   B: Spontaneous Breathing Trial Performed?     C: SAT & SBT Coordinated?  N/A                      D: Delirium: CAM-ICU Overall CAM-ICU: Positive   E: Early Mobility Performed? No   F: Feeding Goal: Goals: Meet % EEN, EPN  Status: Nutrition  Goal Status: new   Current Diet Order   No orders of the defined types were placed in this encounter.      AS: Analgesia/Sedation none   T: Thromboembolic Prophylaxis mechanical   H: HOB > 300 Yes   U: Stress Ulcer Prophylaxis (if needed) pantoprazole   G: Glucose Control none   B: Bowel Function     I: Indwelling Catheter (Lines & Stevens) Necessity Peripheral line, stevens removed, not making urine   D: De-escalation of Antimicrobials/Pharmacotherapies Dc ampicillin    Plan for the day/ETD 24 hr EEG, supportive care    Code Status:  Family/Goals of Care: Full Code         Critical secondary to Patient has a condition that poses threat to life and bodily function: Seizure, inability to protect airway      Critical care was time spent personally by me on the following activities: development of treatment plan with patient or surrogate and bedside caregivers, discussions with consultants, evaluation of patient's response to treatment, examination of patient, ordering and performing treatments and interventions, ordering and review of laboratory studies, ordering and review of radiographic studies, pulse oximetry, re-evaluation of patient's condition. This critical care time did not overlap with that of any other provider or involve time for any procedures.     Colt Gonzales MD  Critical Care Medicine  [facility]

## 2020-01-21 NOTE — PROGRESS NOTES
Pharmacokinetic Assessment Follow Up: IV Vancomycin    Vancomycin Regimen Assessment & Plan:  - Vancomycin level collected with morning labs today resulted as 35.3 mcg/mL.  - ESRD on iHD previously.  - No need for vancomycin dose today regardless of RRT plans today given supra therapeutic level. Collect vancomycin level with morning labs tomorrow. Will re-dose as needed depending on level and RRT plans.    Drug levels (last 3 results):  Recent Labs   Lab Result Units 01/21/20  0116   Vancomycin, Random ug/mL 35.3     Pharmacy will continue to follow and monitor vancomycin.    Please contact pharmacy at extension [phone] for questions regarding this assessment.    Thank you for the consult,   Omkar Dueñas     Patient brief summary:  Vaughn Retana is a 54 y.o. male initiated on antimicrobial therapy with IV Vancomycin for treatment of meningitis    The patient's current regimen is pulse dosing.    Drug Allergies:   Review of patient's allergies indicates:   Allergen Reactions    Fosrenol [lanthanum] Nausea And Vomiting     Nausea and vomiting       Actual Body Weight:   57 kg    Renal Function:   Estimated Creatinine Clearance: 5.7 mL/min (A) (based on SCr of 11.9 mg/dL (H)).,     Dialysis Method (if applicable):  intermittent HD

## 2020-01-21 NOTE — PLAN OF CARE
CMICU DAILY GOALS       A: Awake    RASS: Goal -  -2  Actual - RASS (Maddox Agitation-Sedation Scale): -3-->moderate sedation   Restraint necessity: Clinical Justification: Removing medical devices  B: Breath   SBT: Not attempted   C: Coordinate A & B, analgesics/sedatives   Pain: managed    SAT: Not attempted  D: Delirium   CAM-ICU: Overall CAM-ICU: Positive  E: Early Mobility   MOVE Screen: Fail   Activity: Activity Management: bedrest maintained per order  FAS: Feeding/Nutrition   Diet order: Diet/Nutrition Received: NPO,   Fluid restriction:    T: Thrombus   DVT prophylaxis: VTE Required Core Measure: Pharmacological prophylaxis initiated/maintained  H: HOB Elevation   Head of Bed (HOB): HOB at 20-30 degrees  U: Ulcer Prophylaxis   GI: yes  G: Glucose control   managed    S: Skin   Bundle compliance: yes   Bathing/Skin Care: bath, chlorhexidine, bath, complete, dressed/undressed, foot care, incontinence care, linen changed Date: 1/20 PM Shift  B: Bowel Function   constipation   I: Indwelling Catheters   Arcos necessity:      Urethral Catheter 01/20/20 1950 Straight-tip 16 Fr.-Reason for Continuing Urinary Catheterization: Critically ill in ICU requiring intensive monitoring   CVC necessity: Yes   IPAD offered: Not appropriate  D: De-escalation Antibx   No  Plan for the day   Monitor cardiovascular status, neuro status, monitor for seizure activity. Apply EEG when available  Family/Goals of care/Code Status   Code Status: Full Code     No acute events throughout day, VS and assessment per flow sheet, patient progressing towards goals as tolerated, plan of care reviewed with Vaughn Retana and family, all concerns addressed, will continue to monitor.

## 2020-01-21 NOTE — ASSESSMENT & PLAN NOTE
- Nutrition consulted  - Will start enteral nutrition. Rec'd Novasource @ 30 mL/hr to provide 1440 kcals, 65 g of protein, 516 mL fluid.

## 2020-01-21 NOTE — ASSESSMENT & PLAN NOTE
- Patient on coreg at nursing home; has been compliant as given by nursing home  - Patient with hypertensive emergency on admit  - Started Cardene drip, titrate to BP of 160-180   - Follow up MRI brain, concern for PRES

## 2020-01-22 NOTE — ASSESSMENT & PLAN NOTE
EEG noted on 01/22/20:  EEG note below:     EEG noted abnormal C-EEG due to   1. occasional bilateral (left > right) epileptiform discharges seen, suggestive of underlying epileptiform potential,   2. asymmetry with prominent left sided slowing, suggestive of underlying structural lesion and   3. severe generalized slowing seen, suggestive of severe diffuse or multifocal cerebral dysfunction. No electrographic seizures seen.         Seizures/Epilepsy Monitoring Evaluation:  - Continue current management.  - Neurochecks Q 1hr  - Seizure Precautions  - Continue vEEG monitoring   - Loaded with Keppra 1500mg on 01/21/20  -Continue Maintenance dose 500mg Q12hrs  Will continue to follow patient closely

## 2020-01-22 NOTE — PLAN OF CARE
CMICU DAILY GOALS       A: Awake    RASS: Goal - RASS Goal: 0-->alert and calm  Actual - RASS (Maddox Agitation-Sedation Scale): -3-->moderate sedation   Restraint necessity: Clinical Justification: Removing medical devices  B: Breath   SBT: Fail   C: Coordinate A & B, analgesics/sedatives   Pain: managed    SAT: NA  D: Delirium   CAM-ICU: Overall CAM-ICU: Positive  E: Early Mobility   MOVE Screen: Fail   Activity: Activity Management: bedrest maintained per order  FAS: Feeding/Nutrition   Diet order: Diet/Nutrition Received: NPO, tube feeding,   Fluid restriction:    T: Thrombus   DVT prophylaxis: VTE Required Core Measure: Pharmacological prophylaxis initiated/maintained  H: HOB Elevation   Head of Bed (HOB): HOB at 30 degrees  U: Ulcer Prophylaxis   GI: yes  G: Glucose control   managed Glycemic Management: blood glucose monitoring  S: Skin   Bundle compliance: yes   Bathing/Skin Care: bath, chlorhexidine, bath, complete, dressed/undressed, incontinence care, linen changed Date: 01/21 AM shift  B: Bowel Function   no issues   I: Indwelling Catheters   Arcos necessity: [REMOVED]      Urethral Catheter 01/20/20 1950 Straight-tip 16 Fr.-Reason for Continuing Urinary Catheterization: Critically ill in ICU requiring intensive monitoring   CVC necessity: No   IPAD offered: Not appropriate  D: De-escalation Antibx   No  Plan for the day   Monitor Neuro status, SBT.   Family/Goals of care/Code Status   Code Status: Full Code     No acute events throughout day, VS and assessment per flow sheet, patient progressing towards goals as tolerated, plan of care reviewed with Vaughn Retana and family, all concerns addressed, will continue to monitor.

## 2020-01-22 NOTE — PROGRESS NOTES
[facility]  Critical Care Medicine  Progress Note    Patient Name: Vaughn Retana  MRN: [id number]  Admission Date: 1/20/2020  Hospital Length of Stay: 2 days  Code Status: Full Code  Attending Provider: Hero Burton*  Primary Care Provider: Cori Rnadall MD   Principal Problem: Seizure    Subjective:     HPI:  Patient is a 54 year old male with extensive medical history including ESRD on dialysis MWF (has not received it today), L below knee amputation 2/2 osteomyelitis, dCHF (last echo 8/2/19 [facility]), GERD, h/o multiple ICH w/o residual deficits, and multiple instances of GI bleed (last EGD 11/12/19, esophagitis) who presents to ED [facility] nursing home due to altered mental status. From NH report, nursing home staff went to wake the patient for his morning dialysis. He was confused, lethargic, had a moderate amount of brown colored emesis in his trash can. Patient last got dialysis on Friday. Patient is normally able to ambulate on his own and is alert and oriented; nursing staff reports that he appeared normal yesterday.       At the time of ICU consult, initial work up showed largely unremarkable cbc with no leukocytosis. CMP significant for a bicarb of 36; patient is ESRD with creatinine of 10.8. Patient still makes some urine, and UA was without evidence for UTI. Lactic acid acid was mildly elevated at 2.4 and troponin mildly elevated at .348->.339. INR is at 1.2. Alcohol and drug screen negative. Patient was given versed prior to undergoing CT scan; which showed no acute intraparenchymal hemorrhage or major vascular distribution, senescent changes, and remote lacunar type basal ganglia infarct. Shortly after CT, patient had a witnessed tonic clonic seizure. He received 2 mg of ativan and 1500 of keppra. Patient became non-responsive afterwards and was severely hypertensive with systolic bp in the 200's. Patient was intubated for airway protection and started on propofol  and Cardene drip for hypertension. Family updated over the phone and at bedside.                Hospital/ICU Course:  Patient admitted to critical care medicine on 1/20 with concerns of new onset seizures and s/p intubation for airway protection. Patient was started on vanc, rocephin, ampicillin, and acyclovir to cover for meningitis. Patient had spot EEG while on propofol in ED which did not show seizure activity. MRI done showed chronic changes but no acute abnormalities. Patient was taken off cardene drip soon after he arrived in ICU. Lumbar puncture was done after MRI. CSF labs were not convincing for bacterial or viral meningitis; although cultures are pending. Continuous EEG was done after propofol was stopped which showed epileptiform discharges. Per epilepsy, patient was given another dose of Keppra, will follow up after checking levels as patient is ESRD. Nephrology consulted and started HD.     Interval History/Significant Events:   Off all sedation. Neuro status unchanged. Intubated, PEEP 5 and FiO2 30%.     Review of Systems   Unable to perform ROS: Intubated     Objective:     Vital Signs (Most Recent):  Temp: 97.8 °F (36.6 °C) (01/22/20 1100)  Pulse: 107 (01/22/20 1100)  Resp: 20 (01/22/20 1100)  BP: 100/70 (01/22/20 1100)  SpO2: (!) 93 % (01/22/20 1100) Vital Signs (24h Range):  Temp:  [97.8 °F (36.6 °C)-100.6 °F (38.1 °C)] 97.8 °F (36.6 °C)  Pulse:  [] 107  Resp:  [13-51] 20  SpO2:  [91 %-100 %] 93 %  BP: ()/(50-96) 100/70   Weight: 57 kg (125 lb 10.6 oz)  Body mass index is 20.28 kg/m².      Intake/Output Summary (Last 24 hours) at 1/22/2020 1232  Last data filed at 1/22/2020 1100  Gross per 24 hour   Intake 1520 ml   Output 3700 ml   Net -2180 ml       Physical Exam   Constitutional: No distress.   HENT:   Head: Normocephalic and atraumatic.   Mouth/Throat: No oropharyngeal exudate.   Eyes: Pupils are equal, round, and reactive to light.   Neck: Normal range of motion. Neck supple. No  JVD present.   Cardiovascular: Regular rhythm and normal heart sounds.   No murmur heard.  tachycardic   Pulmonary/Chest:   Vent Mode: A/C  Oxygen Concentration (%):  (30) 30  Resp Rate Total:  (12 br/min-26 br/min) 20 br/min  Vt Set:  (340 mL) 340 mL  PEEP/CPAP:  (5 cmH20) 5 cmH20  Mean Airway Pressure:  (6.9 cmH20-8.9 cmH20) 7.2 cmH20   Abdominal: Soft. Bowel sounds are normal. He exhibits no distension.   Musculoskeletal: Normal range of motion. He exhibits edema.   Left below knee amputation   Neurological:   PEERL. Does not track with his eyes. Only withdraws RLE to pain.    Skin: Skin is warm and dry.   Vitals reviewed.      Vents:  Vent Mode: A/C (01/22/20 0921)  Ventilator Initiated: Yes (01/20/20 1327)  Set Rate: 12 bmp (01/22/20 0921)  Vt Set: 340 mL (01/22/20 0921)  Pressure Support: 0 cmH20 (01/20/20 2000)  PEEP/CPAP: 5 cmH20 (01/22/20 0921)  Oxygen Concentration (%): 30 (01/22/20 1110)  Peak Airway Pressure: 23 cmH2O (01/22/20 0921)  Plateau Pressure: 0 cmH20 (01/22/20 0921)  Total Ve: 4.4 mL (01/22/20 0921)  F/VT Ratio<105 (RSBI): (!) 53.33 (01/22/20 0921)  Lines/Drains/Airways     Drain                 Hemodialysis AV Fistula Right upper arm -- days         Hemodialysis AV Fistula Right upper arm -- days         NG/OG Tube 01/20/20 1932 [place] sump Right nostril 1 day          Airway                 Airway - Non-Surgical 01/20/20 1325 Endotracheal Tube-Hi/Lo 1 day          Peripheral Intravenous Line                 Peripheral IV - Single Lumen 01/20/20 1320 20 G Left Other 1 day         Peripheral IV - Single Lumen 01/21/20 0348 20 G Anterior;Left Forearm 1 day         Peripheral IV - Single Lumen 01/22/20 0012 20 G Anterior;Left;Proximal Forearm less than 1 day              Significant Labs:    CBC/Anemia Profile:  Recent Labs   Lab 01/21/20  1206 01/21/20  1646 01/22/20  0228   WBC 8.78 8.25 9.07   HGB 13.9* 14.3 14.2   HCT 43.7 45.5 45.7    206 177   MCV 89 89 91   RDW 17.2* 16.0* 16.4*         Chemistries:  Recent Labs   Lab 01/21/20  0116 01/22/20  0228    138   K 4.7 5.8*   CL 89* 97   CO2 30* 21*   BUN 66* 47*   CREATININE 11.9* 9.1*   CALCIUM 9.6 9.6   ALBUMIN 3.2* 3.1*   PROT 8.1 9.0*   BILITOT 0.5 0.5   ALKPHOS 260* 256*   ALT 10 12   AST 24 29   MG 2.1 2.0   PHOS 10.3* 8.5*       All pertinent labs within the past 24 hours have been reviewed.    Significant Imaging:  I have reviewed all pertinent imaging results/findings within the past 24 hours.      ABG  Recent Labs   Lab 01/21/20  2334   PH 7.361   PO2 40   PCO2 53.2*   HCO3 30.1*   BE 5     Assessment/Plan:     Neuro  * Seizure  Altered mental status    Patient initially presented to ED prior to getting dialysis due to AMS and brown emesis. Had witnessed tonic clonic seizure requiring ativan shortly after getting a CT head. He was loaded with 1500 mg Keppra, intubated for airway protection, and started on propofol for sedation. Upon physical exam, patient remained unresponsive to verbal or tactile stimuli and had upward left sided gaze with sluggish pupils. Concern for status epilepticus. Differential diagnosis includes polysubstance, sepsis, intracranial abnormalities, and PRES.    - Spot EEG without evidence of current seizure. However patient already received keppra, ativan, and sedated on propofol. 24 hour EEG with L sided structural lesion and underlying epileptiform potential. Initiated 1500 mg keppra on 01/22, per Neurology recs. 01/22 Epilepsy recommends repeat Keppra level and dose Keppra 500 mg BID.   - Substance induced: UDS negative, alcohol level wnl  - Sepsis: Lumbar puncture done, CSF not convincing for meningitis. Discontinue ampicillin, vancomycin, rocephin. Continue acyclovir. Blood cultures, sputum culture+gram stain neg. Initial lactate 2.4, likely due to seizure event; repeat was 1.9. Patient received 500 ml saline in ED.   - Intracranial: patient with history of ICH with no functional deficits. Possibly multiple  foci for seizure overtime. CT without acute changes, MRI brain with chronic changes and hypertensive angiopathy; no acute changes.  - PRES: Patient off cardene drip. MRI reviewed. Will follow blood pressure as patient is normally hypotensive.        Cardiac/Vascular  Renovascular hypertension  Patient on coreg at nursing home; has been compliant as given by nursing home. Patient with hypertensive emergency on admit and started Cardene drip to titrate to BP of 160-180. Off Cardene drip. No acute changes on MRI.     Chronic diastolic heart failure  - Last echo done at [facility] 8/2/19, EF>55%, bi-atrial enlargement      Renal/  ESRD on hemodialysis  Patient normally MWF dialysis. Last full dialysis session done last Friday 01/17/20.   Nephrology following for HD.       Endocrine  Severe malnutrition  - Nutrition consulted  - On Novasource @ 30 mL/hr to provide 1440 kcals, 65 g of protein, 516 mL fluid.     GI  Gastrointestinal hemorrhage with hematemesis  GERD with esophagitis    Patient with reports of coffee ground emesis prior to transport to ED. In ED, dark brown contents suctioned from stomach. Hemoglobin remains stable at 15 and patient is hypertensive. Patient is well known to GI. His most recent scope was in November that just showed esophagitis similar to his previous EGD.     - He received 80 mg IV pantoprazole, currently on 40 mg IV BID starting tomorrow  - Q6 cbc, transfuse as needed  - GI consulted and contacted. Appreciate recommendations       Critical Care Daily Checklist:    A: Awake: RASS Goal/Actual Goal: RASS Goal: 0-->alert and calm  Actual: Maddox Agitation Sedation Scale (RASS): Moderate sedation   B: Spontaneous Breathing Trial Performed?     C: SAT & SBT Coordinated?  na                      D: Delirium: CAM-ICU Overall CAM-ICU: Positive   E: Early Mobility Performed? No   F: Feeding Goal: Goals: Meet % EEN, EPN  Status: Nutrition Goal Status: new   Current Diet Order   No orders of the  defined types were placed in this encounter.      AS: Analgesia/Sedation n/a   T: Thromboembolic Prophylaxis n/a   H: HOB > 300 Yes   U: Stress Ulcer Prophylaxis (if needed) Protonix IV   G: Glucose Control n/a   B: Bowel Function     I: Indwelling Catheter (Lines & Arcos) Necessity Arcos, PIV OG   D: De-escalation of Antimicrobials/Pharmacotherapies Acyclovir.     Plan for the day/ETD n/a    Code Status:  Family/Goals of Care: Full Code         Critical secondary to Patient has a condition that poses threat to life and bodily function: Severe Respiratory Distress      Critical care was time spent personally by me on the following activities: development of treatment plan with patient or surrogate and bedside caregivers, discussions with consultants, evaluation of patient's response to treatment, examination of patient, ordering and performing treatments and interventions, ordering and review of laboratory studies, ordering and review of radiographic studies, pulse oximetry, re-evaluation of patient's condition. This critical care time did not overlap with that of any other provider or involve time for any procedures.     Danielle Arnold MD  Critical Care Medicine  [facility]

## 2020-01-22 NOTE — ASSESSMENT & PLAN NOTE
Altered mental status    Patient initially presented to ED prior to getting dialysis due to AMS and brown emesis. Had witnessed tonic clonic seizure requiring ativan shortly after getting a CT head. He was loaded with 1500 mg Keppra, intubated for airway protection, and started on propofol for sedation. Upon physical exam, patient remained unresponsive to verbal or tactile stimuli and had upward left sided gaze with sluggish pupils. Concern for status epilepticus. Differential diagnosis includes polysubstance, sepsis, intracranial abnormalities, and PRES.    - Spot EEG without evidence of current seizure. However patient already received keppra, ativan, and sedated on propofol. 24 hour EEG with L sided structural lesion and underlying epileptiform potential. Initiated 1500 mg keppra on 01/22, per Neurology recs. 01/22 Epilepsy recommends repeat Keppra level and dose Keppra 500 mg BID.   - Substance induced: UDS negative, alcohol level wnl  - Sepsis: Lumbar puncture done, CSF not convincing for meningitis. Discontinue ampicillin, vancomycin, rocephin. Continue acyclovir. Blood cultures, sputum culture+gram stain neg. Initial lactate 2.4, likely due to seizure event; repeat was 1.9. Patient received 500 ml saline in ED.   - Intracranial: patient with history of ICH with no functional deficits. Possibly multiple foci for seizure overtime. CT without acute changes, MRI brain with chronic changes and hypertensive angiopathy; no acute changes.  - PRES: Patient off cardene drip. MRI reviewed. Will follow blood pressure as patient is normally hypotensive.

## 2020-01-22 NOTE — PROGRESS NOTES
Date/Time of Note


Date/Time of Note


DATE: 11/22/17 


TIME: 10:11





Assessment/Plan


Lines/Catheters


IV Catheter Type (from Nrs):  Peripheral IV


Riojas in Place (from Nrs):  No





Assessment/Plan


Chief Complaint/Hosp Course


1.  Gallbladder fossa abscess/bile leak.  +HIDA.  s/p ERCP with stenting. +

cultures


-continue drain


-abx per sensitivity


-pain management


2. Anemia: no acute bleed noted


-monitor


-transfuse as needed


3. Hypocalcemia


-replete and monitor


4. Diverticulosis


-diet/lifestyle optimization


3. Hiatal hernia: asymptomatic


-monitor


4. Atherosclerosis


-diet/exercise optimization


5. Electrolyte imbalance


-optimize lytes








Thank you. Patient seen and examined in collaboration with Dr. Theodore Edwards.


Problems:  





Subjective


24 Hr Interval Summary


Continues to have output from drain. Min nausea today and hypertensive. No 

fevers, chills, sob, congested cough, cp, palpitations, ha, dizziness, n/v/d/

dysuria.





Exam/Review of Systems


Vital Signs


Vitals





 Vital Signs








  Date Time  Temp Pulse Resp B/P Pulse Ox O2 Delivery O2 Flow Rate FiO2


 


11/22/17 08:12 97.7 58 18 142/62 99   














 Intake and Output   


 


 11/21/17 11/21/17 11/22/17





 15:00 23:00 07:00


 


Intake Total  2350 ml 1330 ml


 


Balance  2350 ml 1330 ml











Exam


Free Text/Dictation


Constitutional:  alert, oriented


Psych:  sad


Head:  atraumatic, normocephalic


Eyes:  nl lids, nl sclera


ENMT:  mucosa pink and moist, nl nasal mucosa & septum


Neck:  non-tender, supple


Respiratory:  clear to auscultation, normal air movement


Cardiovascular:  nl pulses, regular rate and rhythm


Gastrointestinal:  min-tender at drain site, soft, surgical scars (dry without 

drainage/bruising/discoloration), drain with dark green/brown fluid


Musculoskeletal:  nl extremities to inspection, nl gait and stance


Extremities:  normal pulses


Neurological:  nl mental status, nl speech, nl strength


Skin:  nl turgor, rash or lesions


Lymph:  nl lymph nodes





Results


Result Diagram:  


11/22/17 0500                                                                  

              11/22/17 0501














SHILPI IZAGUIRRE NP Nov 22, 2017 10:31 HD completed, blood returned, and needles pulled, net uf 1700ml removed. , post B/P 128/81bn pulse 100, o2 sat  95% . Post bleeding time < 5 minutes.

## 2020-01-22 NOTE — PROGRESS NOTES
[facility]  Neurology  Progress Note    Patient Name: Vaughn Retana  MRN: [id number]  Admission Date: 1/20/2020  Hospital Length of Stay: 2 days  Code Status: Full Code   Attending Provider: Hero Burton*  Primary Care Physician: Cori Randall MD   Principal Problem:Seizure      Subjective:     Interval History: NAEON,.   EEG noted abnormal C-EEG     Current Neurological Medications: N/A    Current Facility-Administered Medications   Medication Dose Route Frequency Provider Last Rate Last Dose    0.9%  NaCl infusion   Intravenous Once Glenis Benavidez, AIDLIA, FNP-C        acyclovir (ZOVIRAX) 320 mg in dextrose 5 % 50 mL IVPB  5 mg/kg (Ideal) Intravenous Q24H Vania Payan DO 50 mL/hr at 01/22/20 1508 320 mg at 01/22/20 1508    chlorhexidine 0.12 % solution 15 mL  15 mL Mouth/Throat BID Bharat Ramirez, NP   15 mL at 01/22/20 0848    levETIRAcetam in NaCl (iso-os) IVPB 500 mg  500 mg Intravenous Q12H Dakota Alfredo MD        pantoprazole injection 40 mg  40 mg Intravenous BID Colt Gonzales MD   40 mg at 01/22/20 0848    sevelamer carbonate pwpk 1.6 g  1.6 g Per OG tube TID Bryan Merrill MD   1.6 g at 01/22/20 1508    sodium chloride 0.9% flush 10 mL  10 mL Intravenous PRN Colt Gonzales MD           Review of Systems   Unable to perform ROS: Intubated        Objective:     Vital Signs (Most Recent):  Temp: 97.8 °F (36.6 °C) (01/22/20 1100)  Pulse: (!) 113 (01/22/20 1606)  Resp: (!) 21 (01/22/20 1606)  BP: 100/70 (01/22/20 1100)  SpO2: 100 % (01/22/20 1606) Vital Signs (24h Range):  Temp:  [97.8 °F (36.6 °C)-100.6 °F (38.1 °C)] 97.8 °F (36.6 °C)  Pulse:  [] 113  Resp:  [13-41] 21  SpO2:  [91 %-100 %] 100 %  BP: ()/(50-96) 100/70     Weight: 57 kg (125 lb 10.6 oz)  Body mass index is 20.28 kg/m².    Physical Exam   Constitutional: He appears well-developed and well-nourished.   HENT:   Head: Normocephalic and atraumatic.    Cardiovascular: Normal rate, regular rhythm, normal heart sounds and intact distal pulses.   Pulmonary/Chest:   Intubated   Abdominal: Soft. Bowel sounds are normal.   Musculoskeletal:   Left BKA due to osteomyelitis   Neurological:   Reflex Scores:       Tricep reflexes are 1+ on the right side and 1+ on the left side.       Bicep reflexes are 1+ on the right side and 1+ on the left side.       Brachioradialis reflexes are 1+ on the right side and 1+ on the left side.       Patellar reflexes are 1+ on the right side and 1+ on the left side.       Achilles reflexes are 1+ on the right side and 1+ on the left side.  Skin: Skin is warm and dry. Capillary refill takes less than 2 seconds.     MENTAL STATUS        Examination was done after propofol was off for over 24 hours  -Patient was responsive to painful stimuli and to verbal stimuli  -Does not follow any commands         CRANIAL NERVES        -Cough and gag reflex present  - Corneal reflex no present (possibly due to sedation)       MOTOR EXAM   Muscle bulk: decreased  Overall muscle tone: decreased  Right arm tone: normal  Left arm tone: normal  Right leg tone: normal  Left leg tone: normal       -Withdraws to pain on upper extremities   -L BKA        NEUROLOGICAL EXAMINATION:     MENTAL STATUS   Attention: decreased. Concentration: decreased.   Speech: mute (Intubated)  Level of consciousness: responsive to painful stimuli ,  arousable by tactile stimuli    MOTOR EXAM     Strength   Right strength: Cannot assess strength as patient does not follow commands  Left strength: Cannot assess strength as patient does not follow commands    REFLEXES     Reflexes   Right brachioradialis: 1+  Left brachioradialis: 1+  Right biceps: 1+  Left biceps: 1+  Right triceps: 1+  Left triceps: 1+  Right patellar: 1+  Left patellar: 1+  Right achilles: 1+  Left achilles: 1+    SENSORY EXAM        Cannot assess sensation as patient does not follow commands     GAIT AND  COORDINATION        Cannot assess strength as patient is bedbound and intubated and has Left Below Knee Amputation     RUE fistula       SENSORY EXAM        Could not test       GAIT AND COORDINATION        Could not test      Significant Labs:   Hemoglobin A1c: No results for input(s): HGBA1C in the last 720 hours.  Blood Culture: No results for input(s): LABBLOO in the last 48 hours.  CBC:   Recent Labs   Lab 01/21/20  1646 01/22/20  0228 01/22/20  1320   WBC 8.25 9.07 9.64   HGB 14.3 14.2 14.8   HCT 45.5 45.7 48.9    177 175     CMP:   Recent Labs   Lab 01/21/20  0116 01/22/20  0228   GLU 96 147*    138   K 4.7 5.8*   CL 89* 97   CO2 30* 21*   BUN 66* 47*   CREATININE 11.9* 9.1*   CALCIUM 9.6 9.6   MG 2.1 2.0   PROT 8.1 9.0*   ALBUMIN 3.2* 3.1*   BILITOT 0.5 0.5   ALKPHOS 260* 256*   AST 24 29   ALT 10 12   ANIONGAP 24* 20*   EGFRNONAA 4.2* 5.8*     CSF Studies:   Recent Labs   Lab 01/21/20  0022   ALIQUT 1.5  1.9   APPEARCSF Clear  Clear   COLORCSF Colorless  Colorless   CSFWBC 3  3   CSFRBC 63*  159*   GLUCCSF 73*   PROTEINCSF 58*     Prealbumin: No results for input(s): PREALBUMIN in the last 48 hours.  Respiratory Culture: No results for input(s): GSRESP, RESPIRATORYC in the last 48 hours.  Urine Culture: No results for input(s): LABURIN in the last 48 hours.  Urine Studies: No results for input(s): COLORU, APPEARANCEUA, PHUR, SPECGRAV, PROTEINUA, GLUCUA, KETONESU, BILIRUBINUA, OCCULTUA, NITRITE, UROBILINOGEN, LEUKOCYTESUR, RBCUA, WBCUA, BACTERIA, SQUAMEPITHEL, HYALINECASTS in the last 48 hours.    Invalid input(s): WRIGHTSUR  All pertinent lab results from the past 24 hours have been reviewed.    Significant Imaging: I have reviewed all pertinent imaging results/findings within the past 24 hours.     EEG note below:  EEG noted abnormal C-EEG due to   1. occasional bilateral (left > right) epileptiform discharges seen, suggestive of underlying epileptiform potential,   2. asymmetry with  prominent left sided slowing, suggestive of underlying structural lesion and   3. severe generalized slowing seen, suggestive of severe diffuse or multifocal cerebral dysfunction. No electrographic seizures seen.     Assessment and Plan:     * Seizure  EEG noted on 01/22/20:  EEG note below:     EEG noted abnormal C-EEG due to   1. occasional bilateral (left > right) epileptiform discharges seen, suggestive of underlying epileptiform potential,   2. asymmetry with prominent left sided slowing, suggestive of underlying structural lesion and   3. severe generalized slowing seen, suggestive of severe diffuse or multifocal cerebral dysfunction. No electrographic seizures seen.         Seizures/Epilepsy Monitoring Evaluation:  - Continue current management.  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 Neurology was consulted for possible seizure like activity that was witnessed on the table during CTH examination. Unclear of seizure semiology. Mentions GTC however, primary team nor nurse unable to tell me what the seizure looked like. Patient was then emergently intubated as there was concern for status. Patient was then loaded with 2 mg ativan, 1500 mg of keppra and 1 mg of midazolam. He was then started on propofol. Spot EEG after the initiation of these medications reveal generealized background suppression with moderate to severe encephalopathy. There were  no epileptiform discharges noted. Patient empirically started on antibiotics for meningitis however, CSF studies thus far unrevealing for infection. Please note on arrival blood pressure was 217/132. BUN/Cr elevated to 58/10.8. Patient missed his dialysis yesterday. MRI unrevealing for any acute processes     Differential Diagnosis   Cause for his encephalopathy includes hypertensive encephalopathy versus uremic encephalopathy. Unclear if patient was having seizures when he initially came in as he was loaded with ativan, midazolam and keppra and then started on propofol. MRI brain however, was unrevealing for any acute processes. Per epilepsy attending note, after EEG was placed on patient (after he was off of the propofol), underlying structural lesion on the left, with underlying epileptiform potential on the left. MICU team then loaded the patient with 1500 mg.     Recommendations  1) Continue EEG off of propofol  2) Continue blood pressure control   3)Continue dialysis for uremic encephalopathy        Acute metabolic encephalopathy  See seizures above    Gastrointestinal hemorrhage with hematemesis  Continue present mgt per primary team    GERD with esophagitis  Continue present mgt per primary team    Severe malnutrition  Continue present mgt per primary team    Altered mental status  See seizures above    ESRD on hemodialysis  Continue dialysis per primary team and Nephrology        VTE Risk Mitigation (From admission, onward)         Ordered     Place sequential compression device  Until discontinued      01/20/20 1503     IP VTE HIGH RISK PATIENT  Once      01/20/20 1503                Dakota Alfredo MD  Neurology  [facility]

## 2020-01-22 NOTE — ASSESSMENT & PLAN NOTE
Patient normally MWF dialysis. Last full dialysis session done last Friday 01/17/20.   Nephrology following for HD.

## 2020-01-22 NOTE — PROGRESS NOTES
2.5hr tx complete net UF 1 liter dario well needles d/c hemostasis achieved 2x2 pressure dressings applied report given HR  spo2@94% family @ BS

## 2020-01-22 NOTE — SUBJECTIVE & OBJECTIVE
Interval History/Significant Events:   Off all sedation. Neuro status unchanged. Intubated, PEEP 5 and FiO2 30%.     Review of Systems   Unable to perform ROS: Intubated     Objective:     Vital Signs (Most Recent):  Temp: 97.8 °F (36.6 °C) (01/22/20 1100)  Pulse: 107 (01/22/20 1100)  Resp: 20 (01/22/20 1100)  BP: 100/70 (01/22/20 1100)  SpO2: (!) 93 % (01/22/20 1100) Vital Signs (24h Range):  Temp:  [97.8 °F (36.6 °C)-100.6 °F (38.1 °C)] 97.8 °F (36.6 °C)  Pulse:  [] 107  Resp:  [13-51] 20  SpO2:  [91 %-100 %] 93 %  BP: ()/(50-96) 100/70   Weight: 57 kg (125 lb 10.6 oz)  Body mass index is 20.28 kg/m².      Intake/Output Summary (Last 24 hours) at 1/22/2020 1232  Last data filed at 1/22/2020 1100  Gross per 24 hour   Intake 1520 ml   Output 3700 ml   Net -2180 ml       Physical Exam   Constitutional: No distress.   HENT:   Head: Normocephalic and atraumatic.   Mouth/Throat: No oropharyngeal exudate.   Eyes: Pupils are equal, round, and reactive to light.   Neck: Normal range of motion. Neck supple. No JVD present.   Cardiovascular: Regular rhythm and normal heart sounds.   No murmur heard.  tachycardic   Pulmonary/Chest:   Vent Mode: A/C  Oxygen Concentration (%):  (30) 30  Resp Rate Total:  (12 br/min-26 br/min) 20 br/min  Vt Set:  (340 mL) 340 mL  PEEP/CPAP:  (5 cmH20) 5 cmH20  Mean Airway Pressure:  (6.9 cmH20-8.9 cmH20) 7.2 cmH20   Abdominal: Soft. Bowel sounds are normal. He exhibits no distension.   Musculoskeletal: Normal range of motion. He exhibits edema.   Left below knee amputation   Neurological:   PEERL. Does not track with his eyes. Only withdraws RLE to pain.    Skin: Skin is warm and dry.   Vitals reviewed.      Vents:  Vent Mode: A/C (01/22/20 0921)  Ventilator Initiated: Yes (01/20/20 1327)  Set Rate: 12 bmp (01/22/20 0921)  Vt Set: 340 mL (01/22/20 0921)  Pressure Support: 0 cmH20 (01/20/20 2000)  PEEP/CPAP: 5 cmH20 (01/22/20 0921)  Oxygen Concentration (%): 30 (01/22/20 1110)  Peak  Airway Pressure: 23 cmH2O (01/22/20 0921)  Plateau Pressure: 0 cmH20 (01/22/20 0921)  Total Ve: 4.4 mL (01/22/20 0921)  F/VT Ratio<105 (RSBI): (!) 53.33 (01/22/20 0921)  Lines/Drains/Airways     Drain                 Hemodialysis AV Fistula Right upper arm -- days         Hemodialysis AV Fistula Right upper arm -- days         NG/OG Tube 01/20/20 1932 [place] sump Right nostril 1 day          Airway                 Airway - Non-Surgical 01/20/20 1325 Endotracheal Tube-Hi/Lo 1 day          Peripheral Intravenous Line                 Peripheral IV - Single Lumen 01/20/20 1320 20 G Left Other 1 day         Peripheral IV - Single Lumen 01/21/20 0348 20 G Anterior;Left Forearm 1 day         Peripheral IV - Single Lumen 01/22/20 0012 20 G Anterior;Left;Proximal Forearm less than 1 day              Significant Labs:    CBC/Anemia Profile:  Recent Labs   Lab 01/21/20  1206 01/21/20  1646 01/22/20 0228   WBC 8.78 8.25 9.07   HGB 13.9* 14.3 14.2   HCT 43.7 45.5 45.7    206 177   MCV 89 89 91   RDW 17.2* 16.0* 16.4*        Chemistries:  Recent Labs   Lab 01/21/20  0116 01/22/20 0228    138   K 4.7 5.8*   CL 89* 97   CO2 30* 21*   BUN 66* 47*   CREATININE 11.9* 9.1*   CALCIUM 9.6 9.6   ALBUMIN 3.2* 3.1*   PROT 8.1 9.0*   BILITOT 0.5 0.5   ALKPHOS 260* 256*   ALT 10 12   AST 24 29   MG 2.1 2.0   PHOS 10.3* 8.5*       All pertinent labs within the past 24 hours have been reviewed.    Significant Imaging:  I have reviewed all pertinent imaging results/findings within the past 24 hours.

## 2020-01-22 NOTE — PLAN OF CARE
Pt remains in CMICU. NSR on monitor. HD completed today. Bath given. Tube feedings initiated at 10 cc/hr, goal 30 cc/hr. EEG in place, keppra administered per order. Arcos pulled. Vent remains. Propofol off since 0940. Opens eyes spontaneously, withdraws to pain. Not following commands.     CMICU DAILY GOALS       A: Awake    RASS: Goal - RASS Goal: 0-->alert and calm  Actual - RASS ([place] Agitation-Sedation Scale): -3-->moderate sedation   Restraint necessity: Clinical Justification: Treatment Interference  B: Breath   SBT: Not attempted   C: Coordinate A & B, analgesics/sedatives   Pain: managed    SAT: Fail  D: Delirium   CAM-ICU: Overall CAM-ICU: Positive  E: Early Mobility   MOVE Screen: Fail   Activity: Activity Management: bedrest maintained per order  FAS: Feeding/Nutrition   Diet order: Diet/Nutrition Received: NPO,   Fluid restriction:    T: Thrombus   DVT prophylaxis: VTE Required Core Measure: Per order contraindicated for SCDs/Anticoagulants  H: HOB Elevation   Head of Bed (HOB): HOB at 30 degrees  U: Ulcer Prophylaxis   GI: yes  G: Glucose control   managed Glycemic Management: blood glucose monitoring  S: Skin   Bundle compliance: yes   Bathing/Skin Care: bath, chlorhexidine, bath, complete, dressed/undressed, incontinence care, linen changed Date: 1/21/20  B: Bowel Function   no issues   I: Indwelling Catheters   Arcos necessity: [REMOVED]      Urethral Catheter 01/20/20 1950 Straight-tip 16 Fr.-Reason for Continuing Urinary Catheterization: Critically ill in ICU requiring intensive monitoring   CVC necessity: No   IPAD offered: Not appropriate  D: De-escalation Antibx   Yes  Plan for the day   HD, EEG cap.   Family/Goals of care/Code Status   Code Status: Full Code     No acute events throughout day, VS and assessment per flow sheet, patient progressing towards goals as tolerated, plan of care reviewed with Vaughn Retana and family, all concerns addressed, will continue to monitor.

## 2020-01-22 NOTE — ASSESSMENT & PLAN NOTE
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 Neurology was consulted for possible seizure like activity that was witnessed on the table during CTH examination. Unclear of seizure semiology. Mentions GTC however, primary team nor nurse unable to tell me what the seizure looked like. Patient was then emergently intubated as there was concern for status. Patient was then loaded with 2 mg ativan, 1500 mg of keppra and 1 mg of midazolam. He was then started on propofol. Spot EEG after the initiation of these medications reveal generealized background suppression with moderate to severe encephalopathy. There were no epileptiform discharges noted. Patient empirically started on antibiotics for meningitis however, CSF studies thus far unrevealing for infection. Please note on arrival blood pressure was 217/132. BUN/Cr elevated to 58/10.8. Patient missed his dialysis yesterday. MRI unrevealing for any acute processes     Differential Diagnosis   Cause for his encephalopathy includes hypertensive encephalopathy versus uremic encephalopathy. Unclear if patient was having seizures when he initially came in as he was loaded with ativan, midazolam and keppra and then started on propofol. MRI brain however, was unrevealing for any acute processes. Per epilepsy attending note, after EEG was placed on patient (after he was off of the propofol), underlying structural lesion on the left, with underlying epileptiform potential on the left. MICU team then loaded the patient with 1500 mg.     Recommendations  1) Continue EEG off of propofol  2) Continue blood pressure control   3)Continue  dialysis for uremic encephalopathy

## 2020-01-22 NOTE — ASSESSMENT & PLAN NOTE
Patient on coreg at nursing home; has been compliant as given by nursing home. Patient with hypertensive emergency on admit and started Cardene drip to titrate to BP of 160-180. Off Cardene drip. No acute changes on MRI.

## 2020-01-22 NOTE — SUBJECTIVE & OBJECTIVE
Subjective:     Interval History: NAEON,.   EEG noted abnormal C-EEG     Current Neurological Medications: N/A    Current Facility-Administered Medications   Medication Dose Route Frequency Provider Last Rate Last Dose    0.9%  NaCl infusion   Intravenous Once Glenis Benavidez, ADILIA, FNP-C        acyclovir (ZOVIRAX) 320 mg in dextrose 5 % 50 mL IVPB  5 mg/kg (Ideal) Intravenous Q24H Vania Payan, DO 50 mL/hr at 01/22/20 1508 320 mg at 01/22/20 1508    chlorhexidine 0.12 % solution 15 mL  15 mL Mouth/Throat BID Bharat Ramirez, NP   15 mL at 01/22/20 0848    levETIRAcetam in NaCl (iso-os) IVPB 500 mg  500 mg Intravenous Q12H Dakota Alfredo MD        pantoprazole injection 40 mg  40 mg Intravenous BID Colt Gonzales MD   40 mg at 01/22/20 0848    sevelamer carbonate pwpk 1.6 g  1.6 g Per OG tube TID Bryan Merrill MD   1.6 g at 01/22/20 1508    sodium chloride 0.9% flush 10 mL  10 mL Intravenous PRN Colt Gonzales MD           Review of Systems   Unable to perform ROS: Intubated        Objective:     Vital Signs (Most Recent):  Temp: 97.8 °F (36.6 °C) (01/22/20 1100)  Pulse: (!) 113 (01/22/20 1606)  Resp: (!) 21 (01/22/20 1606)  BP: 100/70 (01/22/20 1100)  SpO2: 100 % (01/22/20 1606) Vital Signs (24h Range):  Temp:  [97.8 °F (36.6 °C)-100.6 °F (38.1 °C)] 97.8 °F (36.6 °C)  Pulse:  [] 113  Resp:  [13-41] 21  SpO2:  [91 %-100 %] 100 %  BP: ()/(50-96) 100/70     Weight: 57 kg (125 lb 10.6 oz)  Body mass index is 20.28 kg/m².    Physical Exam   Constitutional: He appears well-developed and well-nourished.   HENT:   Head: Normocephalic and atraumatic.   Cardiovascular: Normal rate, regular rhythm, normal heart sounds and intact distal pulses.   Pulmonary/Chest:   Intubated   Abdominal: Soft. Bowel sounds are normal.   Musculoskeletal:   Left BKA due to osteomyelitis   Neurological:   Reflex Scores:       Tricep reflexes are 1+ on the right side and 1+ on the left side.        Bicep reflexes are 1+ on the right side and 1+ on the left side.       Brachioradialis reflexes are 1+ on the right side and 1+ on the left side.       Patellar reflexes are 1+ on the right side and 1+ on the left side.       Achilles reflexes are 1+ on the right side and 1+ on the left side.  Skin: Skin is warm and dry. Capillary refill takes less than 2 seconds.     MENTAL STATUS        Examination was done after propofol was off for over 24 hours  -Patient was responsive to painful stimuli and to verbal stimuli  -Does not follow any commands         CRANIAL NERVES        -Cough and gag reflex present  - Corneal reflex no present (possibly due to sedation)       MOTOR EXAM   Muscle bulk: decreased  Overall muscle tone: decreased  Right arm tone: normal  Left arm tone: normal  Right leg tone: normal  Left leg tone: normal       -Withdraws to pain on upper extremities   -L BKA        NEUROLOGICAL EXAMINATION:     MENTAL STATUS   Attention: decreased. Concentration: decreased.   Speech: mute (Intubated)  Level of consciousness: responsive to painful stimuli ,  arousable by tactile stimuli    MOTOR EXAM     Strength   Right strength: Cannot assess strength as patient does not follow commands  Left strength: Cannot assess strength as patient does not follow commands    REFLEXES     Reflexes   Right brachioradialis: 1+  Left brachioradialis: 1+  Right biceps: 1+  Left biceps: 1+  Right triceps: 1+  Left triceps: 1+  Right patellar: 1+  Left patellar: 1+  Right achilles: 1+  Left achilles: 1+    SENSORY EXAM        Cannot assess sensation as patient does not follow commands     GAIT AND COORDINATION        Cannot assess strength as patient is bedbound and intubated and has Left Below Knee Amputation     RUE fistula       SENSORY EXAM        Could not test       GAIT AND COORDINATION        Could not test      Significant Labs:   Hemoglobin A1c: No results for input(s): HGBA1C in the last 720 hours.  Blood Culture: No  results for input(s): LABBLOO in the last 48 hours.  CBC:   Recent Labs   Lab 01/21/20  1646 01/22/20  0228 01/22/20  1320   WBC 8.25 9.07 9.64   HGB 14.3 14.2 14.8   HCT 45.5 45.7 48.9    177 175     CMP:   Recent Labs   Lab 01/21/20  0116 01/22/20  0228   GLU 96 147*    138   K 4.7 5.8*   CL 89* 97   CO2 30* 21*   BUN 66* 47*   CREATININE 11.9* 9.1*   CALCIUM 9.6 9.6   MG 2.1 2.0   PROT 8.1 9.0*   ALBUMIN 3.2* 3.1*   BILITOT 0.5 0.5   ALKPHOS 260* 256*   AST 24 29   ALT 10 12   ANIONGAP 24* 20*   EGFRNONAA 4.2* 5.8*     CSF Studies:   Recent Labs   Lab 01/21/20  0022   ALIQUT 1.5  1.9   APPEARCSF Clear  Clear   COLORCSF Colorless  Colorless   CSFWBC 3  3   CSFRBC 63*  159*   GLUCCSF 73*   PROTEINCSF 58*     Prealbumin: No results for input(s): PREALBUMIN in the last 48 hours.  Respiratory Culture: No results for input(s): GSRESP, RESPIRATORYC in the last 48 hours.  Urine Culture: No results for input(s): LABURIN in the last 48 hours.  Urine Studies: No results for input(s): COLORU, APPEARANCEUA, PHUR, SPECGRAV, PROTEINUA, GLUCUA, KETONESU, BILIRUBINUA, OCCULTUA, NITRITE, UROBILINOGEN, LEUKOCYTESUR, RBCUA, WBCUA, BACTERIA, SQUAMEPITHEL, HYALINECASTS in the last 48 hours.    Invalid input(s): WRIGHTSUR  All pertinent lab results from the past 24 hours have been reviewed.    Significant Imaging: I have reviewed all pertinent imaging results/findings within the past 24 hours.     EEG note below:  EEG noted abnormal C-EEG due to   1. occasional bilateral (left > right) epileptiform discharges seen, suggestive of underlying epileptiform potential,   2. asymmetry with prominent left sided slowing, suggestive of underlying structural lesion and   3. severe generalized slowing seen, suggestive of severe diffuse or multifocal cerebral dysfunction. No electrographic seizures seen.

## 2020-01-22 NOTE — PROCEDURES
ICU EEG/VIDEO MONITORING REPORT    Vaughn Retana  [id number]  1965    DATE OF SERVICE: 1/21-22/2020    DATE OF ADMISSION: 1/20/2020  8:07 AM    ADMITTING PROVIDER: Venu Curiel MD    METHODOLOGY   Electroencephalographic (EEG) recording is with electrodes placed according to the International 10-20 placement system.  Thirty two (32) channels of digital signal are simultaneously recorded from the scalp and may include EKG, EMG, and/or eye monitors.   Recording band pass was 0.1 to 512 hz.  Digital video recording of the patient is simultaneously recorded with the EEG.  The nursing staff report clinical symptoms and may press an event button when the patient has symptoms of clinical interest to the treating physicians.  EEG and video recording is stored and archived in digital format.  The entire recording is visually reviewed and the times identified by computer analysis as being spikes or seizures are reviewed again.  Activation procedures which include photic stimulation, hyperventilation and instructing patients to perform simple task are done in selected patients.   Compresses spectral analysis (CSA) is also performed on the activity recorded from each individual channel.  This is displayed as a power display of frequencies from 0 to 30 Hz over time.   The CSA analysis is done and displayed continuously.  This is reviewed for asymmetries in power between homologous areas of the scalp and for presence of changes in power which canbe seen when seizures occur.  Sections of suspected abnormalities on the CSA is then compared with the original EEG recording.     [employer] software was also utilized in the review of this study.  This software suite analyzes the EEG recording in multiple domains.  Coherence and rhythmicity is computed to identify EEG sections which may contain organized seizures.  Each channel undergoes analysis to detect presence of spike and sharp waves which have special and  morphological characteristic of epileptic activity.  The routine EEG recording is converted from spacial into frequency domain.  This is then displayed comparing homologous areas to identify areas of significant asymmetry.  Algorithm to identify non-cortically generated artifact is used to separate eye movement, EMG and other artifact from the EEG.      Recording Times  Start on 1/21/2020, 10:47  Stop on 1/22/2020, 07:00    A total of 20 hours and 13 minutes of EEG was recorded.    EEG FINDINGS  Background activity:   The background rhythm was characterized by sharply contoured delta-theta (1-5 Hz) activity mixed with faster frequencies  No posterior dominant alpha rhythm seen.   Symmetry and continuity: the background was continuous; asymmetry with prominent left sided slowing (delta > theta activity seen)         Sleep:   Not seen.    Activation procedures:   Photic stimulation and hyperventilation were not performed     Abnormal activity:   Occasional bilateral (left > right) epileptiform discharges are seen without clear evolution or associated clinical correlate.    EKG:   Irregular rhythm seen.    IMPRESSION:   This is an abnormal C-EEG due to:  1) occasional bilateral (left > right) epileptiform discharges seen, suggestive of underlying epileptiform potential   2) asymmetry with prominent left sided slowing, suggestive of underlying structural lesion  3) severe generalized slowing seen, suggestive of severe diffuse or multifocal cerebral dysfunction  No electrographic seizures seen.    CLINICAL CORRELATION IS RECOMMENDED.    Lolis Vazquez MD, DAYANA(), APRIL STUBBS.  Neurology-Epilepsy.  [facility].

## 2020-01-22 NOTE — ASSESSMENT & PLAN NOTE
- Last echo done at [facility] [place] 8/2/19, EF>55%, bi-atrial enlargement

## 2020-01-22 NOTE — HOSPITAL COURSE
01/22/2020 KENDRA,. EEG noted abnormal C-EEG due to occasional bilateral (left > right) epileptiform discharges seen, suggestive of underlying epileptiform potential, asymmetry with prominent left sided slowing, suggestive of underlying structural lesion and  severe generalized slowing seen, suggestive of severe diffuse or multifocal cerebral dysfunction. No electrographic seizures seen.

## 2020-01-22 NOTE — PROGRESS NOTES
[facility]    Vaughn Retana is a 54 y.o. male currently on hemodialysis for removal of uremic toxins and volume.     Patient seen and evaluated on hemodialysis, tolerating treatment, see HD flowsheet for vitals and assessments.    No Hypotension, chest pain, shortness of breath, cramping, nausea or vomiting.      Labs have been reviewed and the dialysate bath has been adjusted.    Labs:      Recent Labs   Lab 01/20/20  0832 01/21/20  0116 01/22/20  0228   * 143 138   K 4.9 4.7 5.8*   CL 85* 89* 97   CO2 36* 30* 21*   BUN 58* 66* 47*   CREATININE 10.8* 11.9* 9.1*   CALCIUM 10.1 9.6 9.6   PHOS  --  10.3* 8.5*       Recent Labs   Lab 01/21/20  1206 01/21/20  1646 01/22/20  0228   WBC 8.78 8.25 9.07   HGB 13.9* 14.3 14.2   HCT 43.7 45.5 45.7    206 177        Assessment/Plan    Ultrafiltration goal: 1 L as tolerated, keep MAP >65.  - Patient dialyze on yesterday (1/21) remains hyperkalemic this AM (K 5.8) despite HD treatment. Net UF 1.7 during yesterday's treatment.   - Will plan for another short HD treatment today mainly for metabolic clearance, target UF 1 L as tolerated.   - Patient remains intubated, FiO2 30%, defer to primary team    - Seen on dialysis this morning, tolerating session with current UFR, no complications.    - No lab stick/BP intake on access site ([facility])  - Will continue dialysis treatments while in-patient  - Continue to monitor intake and output, daily weights   - Avoid nephrotoxic medication and renal dose medications to GFR    Anemia of ESRD  - Hgb 14.2, within range     BMM  - Renal diet with protein intake goal 1.5 g/kg/d  - Novasource with meals  - Phos 8.5, currently on Novasource TF, continue Sevelamer powder   - Daily renal function panel     Glenis Benavidez, ADILIA, APRN, FNP-C  Nephrology Department  Pager:  [phone]

## 2020-01-22 NOTE — ASSESSMENT & PLAN NOTE
GERD with esophagitis    Patient with reports of coffee ground emesis prior to transport to ED. In ED, dark brown contents suctioned from stomach. Hemoglobin remains stable at 15 and patient is hypertensive. Patient is well known to GI. His most recent scope was in November that just showed esophagitis similar to his previous EGD.     - He received 80 mg IV pantoprazole, currently on 40 mg IV BID starting tomorrow  - Q6 cbc, transfuse as needed  - GI consulted and contacted. Appreciate recommendations

## 2020-01-22 NOTE — PROGRESS NOTES
Bedside tx started to left avf with 2 15 ga needles secured dario well - on vent spo2@98% HR ST on CM report received no edema to rt lowerr ext / left bka noted

## 2020-01-23 PROBLEM — G93.41 ACUTE METABOLIC ENCEPHALOPATHY: Status: RESOLVED | Noted: 2020-01-01 | Resolved: 2020-01-01

## 2020-01-23 NOTE — ASSESSMENT & PLAN NOTE
EEG noted on 01/22/20:  EEG note below:     EEG noted abnormal C-EEG due to   1. occasional bilateral (left > right) epileptiform discharges seen, suggestive of underlying epileptiform potential,   2. asymmetry with prominent left sided slowing, suggestive of underlying structural lesion and   3. severe generalized slowing seen, suggestive of severe diffuse or multifocal cerebral dysfunction. No electrographic seizures seen.         Seizures/Epilepsy Monitoring Evaluation:  - Continue current management.  - Neurochecks Q 1hr  - Seizure Precautions  - Continue vEEG monitoring   - Loaded with Keppra 1500mg on 01/21/20  -Continue Maintenance dose 500mg Q12hrs  Will continue to follow patient closely. If patient does not improve clinically, will place EEG again

## 2020-01-23 NOTE — PLAN OF CARE
CMICU DAILY GOALS       A: Awake    RASS: Goal - RASS Goal: 0-->alert and calm  Actual - RASS ([place] Agitation-Sedation Scale): 0-->alert and calm(pt on no sedation )   Restraint necessity: Clinical Justification: Treatment Interference  B: Breath   SBT: Not attempted   C: Coordinate A & B, analgesics/sedatives   Pain: managed    SAT: Not attempted  D: Delirium   CAM-ICU: Overall CAM-ICU: Positive  E: Early Mobility   MOVE Screen: Fail     Activity: Activity Management: activity clustered for rest period, activity adjusted per tolerance  FAS: Feeding/Nutrition   Diet order: Diet/Nutrition Received: NPO, tube feeding,   Fluid restriction:    T: Thrombus   DVT prophylaxis: VTE Required Core Measure: Pharmacological prophylaxis initiated/maintained  H: HOB Elevation   Head of Bed (HOB): HOB at 30-45 degrees  U: Ulcer Prophylaxis   GI: yes  G: Glucose control   managed Glycemic Management: blood glucose monitoring  S: Skin   Bundle compliance: yes   Bathing/Skin Care: bath, chlorhexidine, bath, complete, dressed/undressed, incontinence care, linen changed Date: 1/22/19 day bath  B: Bowel Function   no issues   I: Indwelling Catheters   Arcos necessity: [REMOVED]      Urethral Catheter 01/20/20 1950 Straight-tip 16 Fr.-Reason for Continuing Urinary Catheterization: Critically ill in ICU requiring intensive monitoring   CVC necessity: No   IPAD offered: Not appropriate  D: De-escalation Antibx   Yes  Plan for the day   Monitor for seizure activity, monitor neuro status   Family/Goals of care/Code Status   Code Status: Full Code     No acute events throughout day, VS and assessment per flow sheet, patient progressing towards goals as tolerated, plan of care reviewed with Vaughn Retana and family, all concerns addressed, will continue to monitor.

## 2020-01-23 NOTE — PROGRESS NOTES
Wound care active rounding for pressure prevention measures completed. The HOB was at 30 degrees, an endo and NG tube are in place.  The Occiput skin is intact, not red.  The buttocks/sacrum are intact with 4 layers under patient and barrier cream in use BID/prn.  The right heel is in a heel protector and the left BKA stump is in located in a protector. The Rodrigo SHAUNNA is appropriately set for patient's weight and wedge/pillows are used to turn/reposition the patient.

## 2020-01-23 NOTE — ASSESSMENT & PLAN NOTE
The patient is a 55 yo AAM admitted to MICU with seizure activity after presenting to the ED on 1/20 after being found with alter mental status at his NH facility Monday morning. The patient is currently on continuous EEG monitoring. Last dialyze on MWF.     Nephrology History  iHD Schedule: MWF  Unit/MD: [facility]/ Dr. Lemus  Duration: 3.5 hrs  EDW: ?  Access: R[facility]ARD AVF   Resodial Renal Function: Yes (per records)    Plan:   - No urgent need for HD today. Will plan for HD tomorrow for metabolic clearance and volume management.   - Recommend daily renal function panels   - Renal diet or Novasource TF when appropriate   - On mechanical ventilation with FiO at 21%  - Continue to monitor intake and output, daily weights   - Avoid nephrotoxic medication and renal dose medications to GFR  - Will discuss with Dr. Whitman

## 2020-01-23 NOTE — ASSESSMENT & PLAN NOTE
Nutrition consulted  On Novasource @ 30 mL/hr to provide 1440 kcals, 65 g of protein, 516 mL fluid.

## 2020-01-23 NOTE — ASSESSMENT & PLAN NOTE
Patient normally MWF dialysis. Last full dialysis session done last Friday 01/17/20.   Nephrology following for HD management.

## 2020-01-23 NOTE — SUBJECTIVE & OBJECTIVE
Interval History/Significant Events:   Off all sedation. Neuro status unchanged. Intubated, PEEP 5 and FiO2 21%.   Febrile over night to 101.3, pt was recultured. Started on Vanc and Zosyn. No Leukocytosis.     Review of Systems   Unable to perform ROS: Intubated     Objective:     Vital Signs (Most Recent):  Temp: 99.2 °F (37.3 °C) (01/23/20 1100)  Pulse: 109 (01/23/20 1312)  Resp: (!) 23 (01/23/20 1312)  BP: (!) 145/79 (01/23/20 1100)  SpO2: (!) 88 % (01/23/20 1312) Vital Signs (24h Range):  Temp:  [98.7 °F (37.1 °C)-101.3 °F (38.5 °C)] 99.2 °F (37.3 °C)  Pulse:  [] 109  Resp:  [15-33] 23  SpO2:  [88 %-100 %] 88 %  BP: ()/(55-93) 145/79   Weight: 57 kg (125 lb 10.6 oz)  Body mass index is 20.28 kg/m².      Intake/Output Summary (Last 24 hours) at 1/23/2020 1411  Last data filed at 1/23/2020 1100  Gross per 24 hour   Intake 1360 ml   Output 0 ml   Net 1360 ml       Physical Exam   Constitutional: No distress.   HENT:   Head: Normocephalic and atraumatic.   Mouth/Throat: No oropharyngeal exudate.   Eyes: Pupils are equal, round, and reactive to light.   Neck: Normal range of motion. Neck supple. No JVD present.   Cardiovascular: Regular rhythm and normal heart sounds. Tachycardia present.   No murmur heard.  Pulmonary/Chest:   Vent Mode: A/C  Oxygen Concentration (%):  (21) 21  Resp Rate Total:  (14 br/min-24 br/min) 18 br/min  Vt Set:  (340 mL) 340 mL  PEEP/CPAP:  (5 cmH20) 5 cmH20  Mean Airway Pressure:  (7.5 cmH20-9.6 cmH20) 8.4 cmH20   Abdominal: Soft. Bowel sounds are normal. He exhibits no distension.   Musculoskeletal: Normal range of motion. He exhibits edema.   Left below knee amputation   Neurological:   PEERL. Does not track with his eyes. Only withdraws RLE to pain. Cough, gag and corneal reflexes intact.    Skin: Skin is warm and dry.   Vitals reviewed.      Vents:  Vent Mode: A/C (01/23/20 1312)  Ventilator Initiated: Yes (01/20/20 1327)  Set Rate: 12 bmp (01/23/20 1312)  Vt Set: 340 mL  (01/23/20 1312)  Pressure Support: 0 cmH20 (01/20/20 2000)  PEEP/CPAP: 5 cmH20 (01/23/20 1312)  Oxygen Concentration (%): 21 (01/23/20 1312)  Peak Airway Pressure: 25 cmH2O (01/23/20 1312)  Plateau Pressure: 0 cmH20 (01/23/20 1312)  Total Ve: 6.84 mL (01/23/20 1312)  F/VT Ratio<105 (RSBI): (!) 59.9 (01/23/20 1312)  Lines/Drains/Airways     Drain                 Hemodialysis AV Fistula Right upper arm -- days         Hemodialysis AV Fistula Right upper arm -- days         NG/OG Tube 01/20/20 1932 [place] sump Right nostril 2 days          Airway                 Airway - Non-Surgical 01/20/20 1325 Endotracheal Tube-Hi/Lo 3 days          Peripheral Intravenous Line                 Peripheral IV - Single Lumen 01/20/20 1320 20 G Left Other 3 days         Peripheral IV - Single Lumen 01/21/20 0348 20 G Anterior;Left Forearm 2 days         Peripheral IV - Single Lumen 01/22/20 0012 20 G Anterior;Left;Proximal Forearm 1 day              Significant Labs:    CBC/Anemia Profile:  Recent Labs   Lab 01/22/20 0228 01/22/20  1320 01/23/20 0221   WBC 9.07 9.64 9.71   HGB 14.2 14.8 13.5*   HCT 45.7 48.9 43.4    175 207   MCV 91 92 91   RDW 16.4* 16.7* 16.2*        Chemistries:  Recent Labs   Lab 01/22/20 0228 01/22/20  1637 01/23/20 0221    141 138   K 5.8* 4.4 4.2   CL 97 97 97   CO2 21* 30* 26   BUN 47* 33* 48*   CREATININE 9.1* 6.2* 7.5*   CALCIUM 9.6 10.2 9.6   ALBUMIN 3.1* 3.1* 3.0*   PROT 9.0*  --  8.8*   BILITOT 0.5  --  0.4   ALKPHOS 256*  --  223*   ALT 12  --  9*   AST 29  --  18   MG 2.0 2.2 2.2   PHOS 8.5* 4.7* 4.9*       All pertinent labs within the past 24 hours have been reviewed.    Significant Imaging:  I have reviewed all pertinent imaging results/findings within the past 24 hours.

## 2020-01-23 NOTE — ASSESSMENT & PLAN NOTE
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 Neurology was consulted for possible seizure like activity that was witnessed on the table during CTH examination. Unclear of seizure semiology. Mentions GTC however, primary team nor nurse unable to tell me what the seizure looked like. Patient was then emergently intubated as there was concern for status. Patient was then loaded with 2 mg ativan, 1500 mg of keppra and 1 mg of midazolam. He was then started on propofol. Spot EEG after the initiation of these medications reveal generealized background suppression with moderate to severe encephalopathy. There were no epileptiform discharges noted. Patient empirically started on antibiotics for meningitis however, CSF studies thus far unrevealing for infection. Please note on arrival blood pressure was 217/132. BUN/Cr elevated to 58/10.8. Patient missed his dialysis yesterday. MRI unrevealing for any acute processes     Differential Diagnosis   Cause for his encephalopathy includes hypertensive encephalopathy versus uremic encephalopathy versus seizures (due to previous cerebral abnormalities). Unclear if patient was in status when he initially came in as he was loaded with ativan, midazolam and keppra and then started on propofol. MRI brain however, was unrevealing for any acute processes. Per epilepsy attending note, after EEG was placed on patient (after he was off of the propofol), underlying structural lesion on the left, with underlying epileptiform potential on the left. MICU team then loaded the patient with 1500 mg. EEG from 1/22-1/23 showed occasional left sided  epileptiform discharges seen, suggestive of underlying epileptiform potential. However, less discharges than EEG that was done on 1/21-1/22.     Recommendations  1) Continue keppra 500 mg BID  2) Continue blood pressure control   3)Continue dialysis for uremic encephalopathy

## 2020-01-23 NOTE — PROGRESS NOTES
[facility]  Neurology  Progress Note    Patient Name: Vaughn Retana  MRN: [id number]  Admission Date: 1/20/2020  Hospital Length of Stay: 3 days  Code Status: Full Code   Attending Provider: Hero Burton*  Primary Care Physician: Cori Randall MD   Principal Problem:Seizure      Subjective:     Interval History: EEG showing occasional left sided epileptiform discharges suggestive of underlying epileptiform potential. However, compared to previous EEGs done earlier on admission, discharges have become less frequent. HSV PCR of CSF negative.     Current Neurological Medications:     Current Facility-Administered Medications   Medication Dose Route Frequency Provider Last Rate Last Dose    0.9%  NaCl infusion   Intravenous Once Glenis Benavidez DNP, FNP-C        acetaminophen tablet 650 mg  650 mg Per NG tube Q6H PRN Yomaira H. Arsenio, NP   650 mg at 01/22/20 2010    chlorhexidine 0.12 % solution 15 mL  15 mL Mouth/Throat BID Bharat Ramirez, NP   15 mL at 01/23/20 0823    levETIRAcetam in NaCl (iso-os) IVPB 500 mg  500 mg Intravenous Q12H Dakota Alfredo  mL/hr at 01/23/20 0823 500 mg at 01/23/20 0823    pantoprazole suspension 40 mg  40 mg Per NG tube BID Hero Burton MD        piperacillin-tazobactam 4.5 g in sodium chloride 0.9% 100 mL IVPB (ready to mix system)  4.5 g Intravenous Q12H Danielle Arnold MD 25 mL/hr at 01/23/20 0822 4.5 g at 01/23/20 0822    sevelamer carbonate pwpk 1.6 g  1.6 g Per OG tube TID Bryan Merrill MD   1.6 g at 01/23/20 0823    sodium chloride 0.9% flush 10 mL  10 mL Intravenous PRN Colt Gonzales MD        vancomycin - pharmacy to dose   Intravenous pharmacy to manage frequency Danielle Arnold MD           Review of Systems   Unable to perform ROS: Intubated   Constitutional: Negative for fever.   Musculoskeletal: Positive for gait problem.   Skin: Positive for rash.     Objective:     Vital Signs (Most  Recent):  Temp: 99.2 °F (37.3 °C) (01/23/20 1100)  Pulse: (!) 112 (01/23/20 1634)  Resp: 19 (01/23/20 1634)  BP: (!) 145/79 (01/23/20 1100)  SpO2: 100 % (01/23/20 1634) Vital Signs (24h Range):  Temp:  [98.7 °F (37.1 °C)-101.3 °F (38.5 °C)] 99.2 °F (37.3 °C)  Pulse:  [] 112  Resp:  [15-33] 19  SpO2:  [88 %-100 %] 100 %  BP: ()/(55-93) 145/79     Weight: 57 kg (125 lb 10.6 oz)  Body mass index is 20.28 kg/m².    Physical Exam   Eyes: Pupils are equal, round, and reactive to light.   Neurological:   Reflex Scores:       Tricep reflexes are 1+ on the right side and 1+ on the left side.       Bicep reflexes are 1+ on the right side and 1+ on the left side.       Brachioradialis reflexes are 1+ on the right side and 1+ on the left side.       Patellar reflexes are 1+ on the right side and 1+ on the left side.       Achilles reflexes are 1+ on the right side and 1+ on the left side.      NEUROLOGICAL EXAMINATION:     MENTAL STATUS        Patient currently intubated however not sedated. Although he is awake he is not following any commands.      CRANIAL NERVES     CN III, IV, VI   Pupils are equal, round, and reactive to light.  Nystagmus: none   Ophthalmoparesis: none       Unable to follow commands for full neurologic exam      REFLEXES     Reflexes   Right brachioradialis: 1+  Left brachioradialis: 1+  Right biceps: 1+  Left biceps: 1+  Right triceps: 1+  Left triceps: 1+  Right patellar: 1+  Left patellar: 1+  Right achilles: 1+  Left achilles: 1+    GAIT AND COORDINATION        Deferred        Significant Labs:   BMP:   Recent Labs   Lab 01/22/20 0228 01/22/20 1637 01/23/20 0221   * 182* 195*    141 138   K 5.8* 4.4 4.2   CL 97 97 97   CO2 21* 30* 26   BUN 47* 33* 48*   CREATININE 9.1* 6.2* 7.5*   CALCIUM 9.6 10.2 9.6   MG 2.0 2.2 2.2     CBC:   Recent Labs   Lab 01/22/20  0228 01/22/20  1320 01/23/20  0221   WBC 9.07 9.64 9.71   HGB 14.2 14.8 13.5*   HCT 45.7 48.9 43.4    175 207      CMP:   Recent Labs   Lab 01/22/20  0228 01/22/20  1637 01/23/20  0221   * 182* 195*    141 138   K 5.8* 4.4 4.2   CL 97 97 97   CO2 21* 30* 26   BUN 47* 33* 48*   CREATININE 9.1* 6.2* 7.5*   CALCIUM 9.6 10.2 9.6   MG 2.0 2.2 2.2   PROT 9.0*  --  8.8*   ALBUMIN 3.1* 3.1* 3.0*   BILITOT 0.5  --  0.4   ALKPHOS 256*  --  223*   AST 29  --  18   ALT 12  --  9*   ANIONGAP 20* 14 15   EGFRNONAA 5.8* 9.3* 7.4*       Significant Imaging: I have reviewed all pertinent imaging results/findings within the past 24 hours.    Assessment and Plan:     * Encephalopathy  53 y/o male with a medical history of ESRD (MWF), left below the knee amputation (2/2 osteomyelitis), CHF, multiple ICH with extensive cerebral microbleeds (5/203- left BG, 9/2016 right basal ganglia hemorrhage, 11/2016  R striatocapsular ICH with IVH) and previous GI bleeds ( EGD 2019 shows esophagitis) presented to the ED on 1/20/20 from [facility] due to altered mental status. Neurology was consulted for possible seizure like activity that was witnessed on the table during CTH examination. Unclear of seizure semiology. Mentions GTC however, primary team nor nurse unable to tell me what the seizure looked like. Patient was then emergently intubated as there was concern for status. Patient was then loaded with 2 mg ativan, 1500 mg of keppra and 1 mg of midazolam. He was then started on propofol. Spot EEG after the initiation of these medications reveal generealized background suppression with moderate to severe encephalopathy. There were no epileptiform discharges noted. Patient empirically started on antibiotics for meningitis however, CSF studies thus far unrevealing for infection. Please note on arrival blood pressure was 217/132. BUN/Cr elevated to 58/10.8. Patient missed his dialysis yesterday. MRI unrevealing for any acute processes     Differential Diagnosis   Cause for his encephalopathy includes hypertensive encephalopathy  versus uremic encephalopathy versus seizures (due to previous cerebral abnormalities). Unclear if patient was in status when he initially came in as he was loaded with ativan, midazolam and keppra and then started on propofol. MRI brain however, was unrevealing for any acute processes. Per epilepsy attending note, after EEG was placed on patient (after he was off of the propofol), underlying structural lesion on the left, with underlying epileptiform potential on the left. MICU team then loaded the patient with 1500 mg. EEG from 1/22-1/23 showed occasional left sided epileptiform discharges seen, suggestive of underlying epileptiform potential. However, less discharges than EEG that was done on 1/21-1/22.     Recommendations  1) Continue keppra 500 mg BID  2) Continue blood pressure control   3)Continue dialysis for uremic encephalopathy        Seizure  EEG noted on 01/22/20:  EEG note below:     EEG noted abnormal C-EEG due to   1. occasional bilateral (left > right) epileptiform discharges seen, suggestive of underlying epileptiform potential,   2. asymmetry with prominent left sided slowing, suggestive of underlying structural lesion and   3. severe generalized slowing seen, suggestive of severe diffuse or multifocal cerebral dysfunction. No electrographic seizures seen.         Seizures/Epilepsy Monitoring Evaluation:  - Continue current management.  - Neurochecks Q 1hr  - Seizure Precautions  - Continue vEEG monitoring   - Loaded with Keppra 1500mg on 01/21/20  -Continue Maintenance dose 500mg Q12hrs  Will continue to follow patient closely. If patient does not improve clinically, will place EEG again       Gastrointestinal hemorrhage with hematemesis  Continue present mgt per primary team    GERD with esophagitis  Continue present mgt per primary team    Severe malnutrition  Continue present mgt per primary team    ESRD on hemodialysis  Continue dialysis per primary team and Nephrology      VTE Risk Mitigation  (From admission, onward)         Ordered     Place sequential compression device  Until discontinued      01/20/20 1503     IP VTE HIGH RISK PATIENT  Once      01/20/20 1503                Rhona Garcia MD  Neurology  [facility]

## 2020-01-23 NOTE — PROGRESS NOTES
Pharmacokinetic Initial Assessment: IV Vancomycin    Assessment/Plan:  - Restarted vancomycin therapy today. Vancomycin add-on to AM labs revealed level of 19.1 mcg/mL today.  - ESRD on iHD previously. Nephrology consulted, planning for no HD today.  - No need for vancomycin dose today given therapeutic level and plan for no HD today. Collect vancomycin level with morning labs tomorrow. Will re-dose post-HD as needed depending on level and RRT plans.    Pharmacy will continue to follow and monitor vancomycin.      Please contact pharmacy at extension [phone] with any questions regarding this assessment.     Thank you for the consult,   Omkar Dueñas     Patient brief summary:  Vaughn Retana is a 54 y.o. male initiated on antimicrobial therapy with IV Vancomycin for treatment of suspected lower respiratory infection    Drug Allergies:   Review of patient's allergies indicates:   Allergen Reactions    Fosrenol [lanthanum] Nausea And Vomiting     Nausea and vomiting       Actual Body Weight:   57 kg    Renal Function:   Estimated Creatinine Clearance: 9 mL/min (A) (based on SCr of 7.5 mg/dL (H)).,     Dialysis Method (if applicable):  intermittent HD

## 2020-01-23 NOTE — PROCEDURES
[facility]     PRELIMINARY C-EEG REPORT:  Review: 1/22/2020, 07:00 - 21:19     Mixed frequency (delta-theta-alpha) background activity seen for most of the study.         Intermittent periods of left sided epileptiform (sharp waves) periodic discharges are seen without any clear evolution or associated clinical correlate.        Findings suggest improvement, but underlying epileptiform potential persist.    Recommend: continue current management; correct underlying etiology and avoid any agents that lower seizure threshold.    Full report to follow.  Will continue to monitor.     Thank you for involving us in the care of this patient.    Lolis Vazquez MD, DAYANA(), FACNS, FAES.  Neurology-Epilepsy.  [facility].

## 2020-01-23 NOTE — PROGRESS NOTES
CCT 2 called for HR at 140. While on the phone pt HR dropped down to 123. Team said to call if HR maintains in the 130s. Will continue to monitor pt.

## 2020-01-23 NOTE — ASSESSMENT & PLAN NOTE
Altered mental status    Patient initially presented to ED prior to getting dialysis due to AMS and brown emesis. Had witnessed tonic clonic seizure requiring ativan shortly after getting a CT head. He was loaded with 1500 mg Keppra, intubated for airway protection, and started on propofol for sedation. Upon physical exam, patient remained unresponsive to verbal or tactile stimuli and had upward left sided gaze with sluggish pupils. Concern for status epilepticus. Differential diagnosis includes polysubstance, sepsis, intracranial abnormalities, and PRES.    - Spot EEG without evidence of current seizure. However patient already received keppra, ativan, and sedated on propofol. 24 hour EEG with L sided structural lesion and underlying epileptiform potential. Initiated 1500 mg keppra on 01/22, per Neurology recs. Continue Keppra 500 mg BID.   - Substance induced: UDS negative, alcohol level wnl  - Sepsis: Initial lactate 2.4, likely due to seizure event; repeat was 1.9. Patient received 500 ml saline in ED. Initial blood cultures, sputum culture+gram stain neg  Lumbar puncture done, CSF not convincing for meningitis, so ampicillin, vancomycin, rocephin d/c'd on 01/22. However, pt was febrile over night to 101.3. Blood cultures repeated and restarted on Vanc and Zosyn. HSV PCR neg; Acyclovir discontinued.   - Intracranial: patient with history of ICH with no functional deficits. Possibly multiple foci for seizure overtime. CT without acute changes, MRI brain with chronic changes and hypertensive angiopathy; no acute changes.  - PRES: Patient off cardene drip. MRI reviewed. Will follow blood pressure as patient is normally hypotensive.

## 2020-01-23 NOTE — ASSESSMENT & PLAN NOTE
GERD with esophagitis    Patient with reports of coffee ground emesis prior to transport to ED. In ED, dark brown contents suctioned from stomach. Hemoglobin remains stable at 15 and patient is hypertensive. Patient is well known to GI. His most recent scope was in November that just showed esophagitis similar to his previous EGD.     - He received 80 mg IV pantoprazole, currently on 40 mg IV BID  - Q6 cbc, transfuse as needed  - GI consulted and contacted. Appreciate recommendations

## 2020-01-23 NOTE — PROGRESS NOTES
[facility]  Nephrology  Progress Note    Patient Name: Vaughn Retana  MRN: [id number]  Admission Date: 1/20/2020  Hospital Length of Stay: 3 days  Attending Provider: Hero Burton*   Primary Care Physician: Cori Randall MD  Principal Problem:Seizure    Subjective:     Interval History: HD completed on yesterday (1/22). Net UF 1 L. Electrolytes stable this AM. BP stable. Patient remains intubated, FiO2 21%. EEG continued.     Review of patient's allergies indicates:   Allergen Reactions    Fosrenol [lanthanum] Nausea And Vomiting     Nausea and vomiting     Current Facility-Administered Medications   Medication Frequency    0.9%  NaCl infusion Once    acetaminophen tablet 650 mg Q6H PRN    acyclovir (ZOVIRAX) 320 mg in dextrose 5 % 50 mL IVPB Q24H    chlorhexidine 0.12 % solution 15 mL BID    levETIRAcetam in NaCl (iso-os) IVPB 500 mg Q12H    pantoprazole injection 40 mg BID    piperacillin-tazobactam 4.5 g in sodium chloride 0.9% 100 mL IVPB (ready to mix system) Q12H    sevelamer carbonate pwpk 1.6 g TID    sodium chloride 0.9% flush 10 mL PRN    vancomycin - pharmacy to dose pharmacy to manage frequency       Objective:     Vital Signs (Most Recent):  Temp: 98.7 °F (37.1 °C) (01/23/20 0700)  Pulse: 106 (01/23/20 0900)  Resp: (!) 29 (01/23/20 0900)  BP: (!) 156/86 (01/23/20 0900)  SpO2: 100 % (01/23/20 0900)  O2 Device (Oxygen Therapy): ventilator (01/23/20 0900) Vital Signs (24h Range):  Temp:  [97.8 °F (36.6 °C)-101.3 °F (38.5 °C)] 98.7 °F (37.1 °C)  Pulse:  [] 106  Resp:  [15-33] 29  SpO2:  [90 %-100 %] 100 %  BP: ()/(55-93) 156/86     Weight: 57 kg (125 lb 10.6 oz) (01/21/20 1300)  Body mass index is 20.28 kg/m².  Body surface area is 1.63 meters squared.    I/O last 3 completed shifts:  In: 2290 [I.V.:330; Other:650; NG/GT:1110; IV Piggyback:200]  Out: 3700 [Other:3700]    Physical Exam   Constitutional: He appears well-nourished. He appears ill. He is  sedated and intubated.   HENT:   Head: Normocephalic and atraumatic.   Mouth/Throat: No oropharyngeal exudate.   Eyes: Right eye exhibits no discharge. Left eye exhibits no discharge. No scleral icterus.   Neck: Normal range of motion. Neck supple. No JVD present.   Cardiovascular: Regular rhythm and normal heart sounds.   No murmur heard.  Pulmonary/Chest: He is intubated. He has rales (bilateral lung bases ).   Abdominal: Soft. Bowel sounds are normal. He exhibits no distension.   Musculoskeletal: Normal range of motion. He exhibits deformity. He exhibits no edema.   Left below knee amputation   Neurological:   Intubated/sedated   Skin: Skin is warm and dry.   Vitals reviewed.      Significant Labs:  CBC:   Recent Labs   Lab 01/23/20 0221   WBC 9.71   RBC 4.78   HGB 13.5*   HCT 43.4      MCV 91   MCH 28.2   MCHC 31.1*     CMP:   Recent Labs   Lab 01/23/20 0221   *   CALCIUM 9.6   ALBUMIN 3.0*   PROT 8.8*      K 4.2   CO2 26   CL 97   BUN 48*   CREATININE 7.5*   ALKPHOS 223*   ALT 9*   AST 18   BILITOT 0.4          Assessment/Plan:     * Seizure  - per primary team     ESRD on hemodialysis  The patient is a 55 yo AAM admitted to MICU with seizure activity after presenting to the ED on 1/20 after being found with alter mental status at his NH facility Monday morning. The patient is currently on continuous EEG monitoring. Last dialyze on MWF.     Nephrology History  iHD Schedule: [facility]  Unit/MD: [facility]/ Dr. Lemus  Duration: 3.5 hrs  EDW: ?  Access: [facility]   Resodial Renal Function: Yes (per records)    Plan:   - No urgent need for HD today. Will plan for HD tomorrow for metabolic clearance and volume management.   - Recommend daily renal function panels   - Renal diet or Novasource TF when appropriate   - On mechanical ventilation with FiO at 21%  - Continue to monitor intake and output, daily weights   - Avoid nephrotoxic medication and renal dose medications to GFR  - Will discuss with  Dr. Whitman       Thank you for your consult. I will follow-up with patient. Please contact us if you have any additional questions.    Glenis Benavidez DNP, FNP-C  Nephrology  [facility]

## 2020-01-23 NOTE — PROGRESS NOTES
[facility]  Critical Care Medicine  Progress Note    Patient Name: Vaughn Retana  MRN: [id number]  Admission Date: 1/20/2020  Hospital Length of Stay: 3 days  Code Status: Full Code  Attending Provider: Hero Burton*  Primary Care Provider: Cori Randall MD   Principal Problem: Seizure    Subjective:     HPI:  Patient is a 54 year old male with extensive medical history including ESRD on dialysis MWF (has not received it today), L below knee amputation 2/2 osteomyelitis, [place] [facility] [place] GERD, h/o multiple ICH w/o residual deficits, and multiple instances of GI bleed (last EGD 11/12/19, esophagitis) who presents to ED [facility] nursing home due to altered mental status. From NH report, nursing home staff went to wake the patient for his morning dialysis. He was confused, lethargic, had a moderate amount of brown colored emesis in his trash can. Patient last got dialysis on Friday. Patient is normally able to ambulate on his own and is alert and oriented; nursing staff reports that he appeared normal yesterday.       At the time of ICU consult, initial work up showed largely unremarkable cbc with no leukocytosis. CMP significant for a bicarb of 36; patient is ESRD with creatinine of 10.8. Patient still makes some urine, and UA was without evidence for UTI. Lactic acid acid was mildly elevated at 2.4 and troponin mildly elevated at .348->.339. INR is at 1.2. Alcohol and drug screen negative. Patient was given versed prior to undergoing CT scan; which showed no acute intraparenchymal hemorrhage or major vascular distribution, senescent changes, and remote lacunar type basal ganglia infarct. Shortly after CT, patient had a witnessed tonic clonic seizure. He received 2 mg of ativan and 1500 of keppra. Patient became non-responsive afterwards and was severely hypertensive with systolic bp in the 200's. Patient was intubated for airway protection and started on propofol  and Cardene drip for hypertension. Family updated over the phone and at bedside.                Hospital/ICU Course:  Patient admitted to critical care medicine on 1/20 with concerns of new onset seizures and s/p intubation for airway protection. Patient was started on vanc, rocephin, ampicillin, and acyclovir to cover for meningitis. Patient had spot EEG while on propofol in ED which did not show seizure activity. MRI done showed chronic changes but no acute abnormalities. Patient was taken off cardene drip soon after he arrived in ICU. Lumbar puncture was done after MRI. CSF labs were not convincing for bacterial or viral meningitis; although cultures are pending. Continuous EEG was done after propofol was stopped which showed epileptiform discharges. Per epilepsy, patient was given another dose of Keppra, will follow up after checking levels as patient is ESRD. Nephrology consulted and started HD. Pt became febrile the night of 01/22; blood cultures repeated and restarted on Vanc and Zosyn. HSV PCR neg so Acyclovir discontinued.     Interval History/Significant Events:   Off all sedation. Neuro status unchanged. Intubated, PEEP 5 and FiO2 21%.   Febrile over night to 101.3, pt was recultured. Started on Vanc and Zosyn. No Leukocytosis.     Review of Systems   Unable to perform ROS: Intubated     Objective:     Vital Signs (Most Recent):  Temp: 99.2 °F (37.3 °C) (01/23/20 1100)  Pulse: 109 (01/23/20 1312)  Resp: (!) 23 (01/23/20 1312)  BP: (!) 145/79 (01/23/20 1100)  SpO2: (!) 88 % (01/23/20 1312) Vital Signs (24h Range):  Temp:  [98.7 °F (37.1 °C)-101.3 °F (38.5 °C)] 99.2 °F (37.3 °C)  Pulse:  [] 109  Resp:  [15-33] 23  SpO2:  [88 %-100 %] 88 %  BP: ()/(55-93) 145/79   Weight: 57 kg (125 lb 10.6 oz)  Body mass index is 20.28 kg/m².      Intake/Output Summary (Last 24 hours) at 1/23/2020 1411  Last data filed at 1/23/2020 1100  Gross per 24 hour   Intake 1360 ml   Output 0 ml   Net 1360 ml        Physical Exam   Constitutional: No distress.   HENT:   Head: Normocephalic and atraumatic.   Mouth/Throat: No oropharyngeal exudate.   Eyes: Pupils are equal, round, and reactive to light.   Neck: Normal range of motion. Neck supple. No JVD present.   Cardiovascular: Regular rhythm and normal heart sounds. Tachycardia present.   No murmur heard.  Pulmonary/Chest:   Vent Mode: A/C  Oxygen Concentration (%):  (21) 21  Resp Rate Total:  (14 br/min-24 br/min) 18 br/min  Vt Set:  (340 mL) 340 mL  PEEP/CPAP:  (5 cmH20) 5 cmH20  Mean Airway Pressure:  (7.5 cmH20-9.6 cmH20) 8.4 cmH20   Abdominal: Soft. Bowel sounds are normal. He exhibits no distension.   Musculoskeletal: Normal range of motion. He exhibits edema.   Left below knee amputation   Neurological:   PEERL. Does not track with his eyes. Only withdraws RLE to pain. Cough, gag and corneal reflexes intact.    Skin: Skin is warm and dry.   Vitals reviewed.      Vents:  Vent Mode: A/C (01/23/20 1312)  Ventilator Initiated: Yes (01/20/20 1327)  Set Rate: 12 bmp (01/23/20 1312)  Vt Set: 340 mL (01/23/20 1312)  Pressure Support: 0 cmH20 (01/20/20 2000)  PEEP/CPAP: 5 cmH20 (01/23/20 1312)  Oxygen Concentration (%): 21 (01/23/20 1312)  Peak Airway Pressure: 25 cmH2O (01/23/20 1312)  Plateau Pressure: 0 cmH20 (01/23/20 1312)  Total Ve: 6.84 mL (01/23/20 1312)  F/VT Ratio<105 (RSBI): (!) 59.9 (01/23/20 1312)  Lines/Drains/Airways     Drain                 Hemodialysis AV Fistula Right upper arm -- days         Hemodialysis AV Fistula Right upper arm -- days         NG/OG Tube 01/20/20 1932 Childress sump Right nostril 2 days          Airway                 Airway - Non-Surgical 01/20/20 1325 Endotracheal Tube-Hi/Lo 3 days          Peripheral Intravenous Line                 Peripheral IV - Single Lumen 01/20/20 1320 20 G Left Other 3 days         Peripheral IV - Single Lumen 01/21/20 0348 20 G Anterior;Left Forearm 2 days         Peripheral IV - Single Lumen 01/22/20 0012  20 G Anterior;Left;Proximal Forearm 1 day              Significant Labs:    CBC/Anemia Profile:  Recent Labs   Lab 01/22/20  0228 01/22/20  1320 01/23/20  0221   WBC 9.07 9.64 9.71   HGB 14.2 14.8 13.5*   HCT 45.7 48.9 43.4    175 207   MCV 91 92 91   RDW 16.4* 16.7* 16.2*        Chemistries:  Recent Labs   Lab 01/22/20  0228 01/22/20  1637 01/23/20 0221    141 138   K 5.8* 4.4 4.2   CL 97 97 97   CO2 21* 30* 26   BUN 47* 33* 48*   CREATININE 9.1* 6.2* 7.5*   CALCIUM 9.6 10.2 9.6   ALBUMIN 3.1* 3.1* 3.0*   PROT 9.0*  --  8.8*   BILITOT 0.5  --  0.4   ALKPHOS 256*  --  223*   ALT 12  --  9*   AST 29  --  18   MG 2.0 2.2 2.2   PHOS 8.5* 4.7* 4.9*       All pertinent labs within the past 24 hours have been reviewed.    Significant Imaging:  I have reviewed all pertinent imaging results/findings within the past 24 hours.      ABG  Recent Labs   Lab 01/21/20  2334   PH 7.361   PO2 40   PCO2 53.2*   HCO3 30.1*   BE 5     Assessment/Plan:     Neuro  * Seizure  Altered mental status    Patient initially presented to ED prior to getting dialysis due to AMS and brown emesis. Had witnessed tonic clonic seizure requiring ativan shortly after getting a CT head. He was loaded with 1500 mg Keppra, intubated for airway protection, and started on propofol for sedation. Upon physical exam, patient remained unresponsive to verbal or tactile stimuli and had upward left sided gaze with sluggish pupils. Concern for status epilepticus. Differential diagnosis includes polysubstance, sepsis, intracranial abnormalities, and PRES.    - Spot EEG without evidence of current seizure. However patient already received keppra, ativan, and sedated on propofol. 24 hour EEG with L sided structural lesion and underlying epileptiform potential. Initiated 1500 mg keppra on 01/22, per Neurology recs. Continue Keppra 500 mg BID.   - Substance induced: UDS negative, alcohol level wnl  - Sepsis: Initial lactate 2.4, likely due to seizure event;  repeat was 1.9. Patient received 500 ml saline in ED. Initial blood cultures, sputum culture+gram stain neg  Lumbar puncture done, CSF not convincing for meningitis, so ampicillin, vancomycin, rocephin d/c'd on 01/22. However, pt was febrile over night to 101.3. Blood cultures repeated and restarted on Vanc and Zosyn. HSV PCR neg; Acyclovir discontinued.   - Intracranial: patient with history of ICH with no functional deficits. Possibly multiple foci for seizure overtime. CT without acute changes, MRI brain with chronic changes and hypertensive angiopathy; no acute changes.  - PRES: Patient off cardene drip. MRI reviewed. Will follow blood pressure as patient is normally hypotensive.        Cardiac/Vascular  Renovascular hypertension  Patient on coreg at nursing home; has been compliant as given by nursing home. Patient with hypertensive emergency on admit and started Cardene drip to titrate to BP of 160-180. Off Cardene drip. No acute changes on MRI.     Chronic diastolic heart failure  [place] [facility] [place], EF>55%, bi-atrial enlargement      Renal/  ESRD on hemodialysis  Patient normally MWF dialysis. Last full dialysis session done last Friday 01/17/20.   Nephrology following for HD management.       Endocrine  Severe malnutrition  Nutrition consulted  On Novasource @ 30 mL/hr to provide 1440 kcals, 65 g of protein, 516 mL fluid.     GI  Gastrointestinal hemorrhage with hematemesis  GERD with esophagitis    Patient with reports of coffee ground emesis prior to transport to ED. In ED, dark brown contents suctioned from stomach. Hemoglobin remains stable at 15 and patient is hypertensive. Patient is well known to GI. His most recent scope was in November that just showed esophagitis similar to his previous EGD.     - He received 80 mg IV pantoprazole, currently on 40 mg IV BID  - Q6 cbc, transfuse as needed  - GI consulted and contacted. Appreciate recommendations       Critical Care Daily Checklist:     A: Awake: RASS Goal/Actual Goal: RASS Goal: 0-->alert and calm  Actual: Maddox Agitation Sedation Scale (RASS): Alert and calm   B: Spontaneous Breathing Trial Performed?     C: SAT & SBT Coordinated?  n/a                      D: Delirium: CAM-ICU Overall CAM-ICU: Positive   E: Early Mobility Performed? No   F: Feeding Goal: Goals: Meet % EEN, EPN  Status: Nutrition Goal Status: new   Current Diet Order   No orders of the defined types were placed in this encounter.      AS: Analgesia/Sedation n/a   T: Thromboembolic Prophylaxis n/a   H: HOB > 300 Yes   U: Stress Ulcer Prophylaxis (if needed) Protonix   G: Glucose Control n/a   B: Bowel Function Stool Occurrence: 1   I: Indwelling Catheter (Lines & Arcos) Necessity Arcos, PIV   D: De-escalation of Antimicrobials/Pharmacotherapies Vanc and Zosyn    Plan for the day/ETD     Code Status:  Family/Goals of Care: Full Code         Critical secondary to Patient has a condition that poses threat to life and bodily function: Severe Respiratory Distress      Critical care was time spent personally by me on the following activities: development of treatment plan with patient or surrogate and bedside caregivers, discussions with consultants, evaluation of patient's response to treatment, examination of patient, ordering and performing treatments and interventions, ordering and review of laboratory studies, ordering and review of radiographic studies, pulse oximetry, re-evaluation of patient's condition. This critical care time did not overlap with that of any other provider or involve time for any procedures.     Danielle Arnold MD  Critical Care Medicine  [facility]

## 2020-01-23 NOTE — SUBJECTIVE & OBJECTIVE
Interval History: HD completed on yesterday (1/22). Net UF 1 L. Electrolytes stable this AM. BP stable. Patient remains intubated, FiO2 21%. EEG continued.     Review of patient's allergies indicates:   Allergen Reactions    Fosrenol [lanthanum] Nausea And Vomiting     Nausea and vomiting     Current Facility-Administered Medications   Medication Frequency    0.9%  NaCl infusion Once    acetaminophen tablet 650 mg Q6H PRN    acyclovir (ZOVIRAX) 320 mg in dextrose 5 % 50 mL IVPB Q24H    chlorhexidine 0.12 % solution 15 mL BID    levETIRAcetam in NaCl (iso-os) IVPB 500 mg Q12H    pantoprazole injection 40 mg BID    piperacillin-tazobactam 4.5 g in sodium chloride 0.9% 100 mL IVPB (ready to mix system) Q12H    sevelamer carbonate pwpk 1.6 g TID    sodium chloride 0.9% flush 10 mL PRN    vancomycin - pharmacy to dose pharmacy to manage frequency       Objective:     Vital Signs (Most Recent):  Temp: 98.7 °F (37.1 °C) (01/23/20 0700)  Pulse: 106 (01/23/20 0900)  Resp: (!) 29 (01/23/20 0900)  BP: (!) 156/86 (01/23/20 0900)  SpO2: 100 % (01/23/20 0900)  O2 Device (Oxygen Therapy): ventilator (01/23/20 0900) Vital Signs (24h Range):  Temp:  [97.8 °F (36.6 °C)-101.3 °F (38.5 °C)] 98.7 °F (37.1 °C)  Pulse:  [] 106  Resp:  [15-33] 29  SpO2:  [90 %-100 %] 100 %  BP: ()/(55-93) 156/86     Weight: 57 kg (125 lb 10.6 oz) (01/21/20 1300)  Body mass index is 20.28 kg/m².  Body surface area is 1.63 meters squared.    I/O last 3 completed shifts:  In: 2290 [I.V.:330; Other:650; NG/GT:1110; IV Piggyback:200]  Out: 3700 [Other:3700]    Physical Exam   Constitutional: He appears well-nourished. He appears ill. He is sedated and intubated.   HENT:   Head: Normocephalic and atraumatic.   Mouth/Throat: No oropharyngeal exudate.   Eyes: Right eye exhibits no discharge. Left eye exhibits no discharge. No scleral icterus.   Neck: Normal range of motion. Neck supple. No JVD present.   Cardiovascular: Regular rhythm and  normal heart sounds.   No murmur heard.  Pulmonary/Chest: He is intubated. He has rales (bilateral lung bases ).   Abdominal: Soft. Bowel sounds are normal. He exhibits no distension.   Musculoskeletal: Normal range of motion. He exhibits deformity. He exhibits no edema.   Left below knee amputation   Neurological:   Intubated/sedated   Skin: Skin is warm and dry.   Vitals reviewed.      Significant Labs:  CBC:   Recent Labs   Lab 01/23/20 0221   WBC 9.71   RBC 4.78   HGB 13.5*   HCT 43.4      MCV 91   MCH 28.2   MCHC 31.1*     CMP:   Recent Labs   Lab 01/23/20 0221   *   CALCIUM 9.6   ALBUMIN 3.0*   PROT 8.8*      K 4.2   CO2 26   CL 97   BUN 48*   CREATININE 7.5*   ALKPHOS 223*   ALT 9*   AST 18   BILITOT 0.4

## 2020-01-23 NOTE — SUBJECTIVE & OBJECTIVE
Subjective:     Interval History: EEG showing occasional left sided epileptiform discharges suggestive of underlying epileptiform potential. However, compared to previous EEGs done earlier on admission, discharges have become less frequent. HSV PCR of CSF negative.     Current Neurological Medications:     Current Facility-Administered Medications   Medication Dose Route Frequency Provider Last Rate Last Dose    0.9%  NaCl infusion   Intravenous Once Glenis Benavidez DNP, FNP-C        acetaminophen tablet 650 mg  650 mg Per NG tube Q6H PRN Yomaira BO Arsenio, NP   650 mg at 01/22/20 2010    chlorhexidine 0.12 % solution 15 mL  15 mL Mouth/Throat BID Bharat Ramirez, MARYJO   15 mL at 01/23/20 0823    levETIRAcetam in NaCl (iso-os) IVPB 500 mg  500 mg Intravenous Q12H Dakota Alfredo  mL/hr at 01/23/20 0823 500 mg at 01/23/20 0823    pantoprazole suspension 40 mg  40 mg Per NG tube BID Hero Burton MD        piperacillin-tazobactam 4.5 g in sodium chloride 0.9% 100 mL IVPB (ready to mix system)  4.5 g Intravenous Q12H Danielle Arnold MD 25 mL/hr at 01/23/20 0822 4.5 g at 01/23/20 0822    sevelamer carbonate pwpk 1.6 g  1.6 g Per OG tube TID Bryan Merrill MD   1.6 g at 01/23/20 0823    sodium chloride 0.9% flush 10 mL  10 mL Intravenous PRN Colt Gonzales MD        vancomycin - pharmacy to dose   Intravenous pharmacy to manage frequency Danielle Arnold MD           Review of Systems   Unable to perform ROS: Intubated   Constitutional: Negative for fever.   Musculoskeletal: Positive for gait problem.   Skin: Positive for rash.     Objective:     Vital Signs (Most Recent):  Temp: 99.2 °F (37.3 °C) (01/23/20 1100)  Pulse: (!) 112 (01/23/20 1634)  Resp: 19 (01/23/20 1634)  BP: (!) 145/79 (01/23/20 1100)  SpO2: 100 % (01/23/20 1634) Vital Signs (24h Range):  Temp:  [98.7 °F (37.1 °C)-101.3 °F (38.5 °C)] 99.2 °F (37.3 °C)  Pulse:  [] 112  Resp:  [15-33] 19  SpO2:  [88 %-100  %] 100 %  BP: ()/(55-93) 145/79     Weight: 57 kg (125 lb 10.6 oz)  Body mass index is 20.28 kg/m².    Physical Exam   Eyes: Pupils are equal, round, and reactive to light.   Neurological:   Reflex Scores:       Tricep reflexes are 1+ on the right side and 1+ on the left side.       Bicep reflexes are 1+ on the right side and 1+ on the left side.       Brachioradialis reflexes are 1+ on the right side and 1+ on the left side.       Patellar reflexes are 1+ on the right side and 1+ on the left side.       Achilles reflexes are 1+ on the right side and 1+ on the left side.      NEUROLOGICAL EXAMINATION:     MENTAL STATUS        Patient currently intubated however not sedated. Although he is awake he is not following any commands.      CRANIAL NERVES     CN III, IV, VI   Pupils are equal, round, and reactive to light.  Nystagmus: none   Ophthalmoparesis: none       Unable to follow commands for full neurologic exam      REFLEXES     Reflexes   Right brachioradialis: 1+  Left brachioradialis: 1+  Right biceps: 1+  Left biceps: 1+  Right triceps: 1+  Left triceps: 1+  Right patellar: 1+  Left patellar: 1+  Right achilles: 1+  Left achilles: 1+    GAIT AND COORDINATION        Deferred        Significant Labs:   BMP:   Recent Labs   Lab 01/22/20 0228 01/22/20 1637 01/23/20 0221   * 182* 195*    141 138   K 5.8* 4.4 4.2   CL 97 97 97   CO2 21* 30* 26   BUN 47* 33* 48*   CREATININE 9.1* 6.2* 7.5*   CALCIUM 9.6 10.2 9.6   MG 2.0 2.2 2.2     CBC:   Recent Labs   Lab 01/22/20 0228 01/22/20  1320 01/23/20 0221   WBC 9.07 9.64 9.71   HGB 14.2 14.8 13.5*   HCT 45.7 48.9 43.4    175 207     CMP:   Recent Labs   Lab 01/22/20 0228 01/22/20  1637 01/23/20 0221   * 182* 195*    141 138   K 5.8* 4.4 4.2   CL 97 97 97   CO2 21* 30* 26   BUN 47* 33* 48*   CREATININE 9.1* 6.2* 7.5*   CALCIUM 9.6 10.2 9.6   MG 2.0 2.2 2.2   PROT 9.0*  --  8.8*   ALBUMIN 3.1* 3.1* 3.0*   BILITOT 0.5  --  0.4    ALKPHOS 256*  --  223*   AST 29  --  18   ALT 12  --  9*   ANIONGAP 20* 14 15   EGFRNONAA 5.8* 9.3* 7.4*       Significant Imaging: I have reviewed all pertinent imaging results/findings within the past 24 hours.

## 2020-01-23 NOTE — PROCEDURES
ICU EEG/VIDEO MONITORING REPORT    Vaughn Retana  [id number]  1965    DATE OF SERVICE: 1/22-23/2020    DATE OF ADMISSION: 1/20/2020  8:07 AM    ADMITTING PROVIDER: Venu Curiel MD    METHODOLOGY   Electroencephalographic (EEG) recording is with electrodes placed according to the International 10-20 placement system.  Thirty two (32) channels of digital signal are simultaneously recorded from the scalp and may include EKG, EMG, and/or eye monitors.   Recording band pass was 0.1 to 512 hz.  Digital video recording of the patient is simultaneously recorded with the EEG.  The nursing staff report clinical symptoms and may press an event button when the patient has symptoms of clinical interest to the treating physicians.  EEG and video recording is stored and archived in digital format.  The entire recording is visually reviewed and the times identified by computer analysis as being spikes or seizures are reviewed again.  Activation procedures which include photic stimulation, hyperventilation and instructing patients to perform simple task are done in selected patients.   Compresses spectral analysis (CSA) is also performed on the activity recorded from each individual channel.  This is displayed as a power display of frequencies from 0 to 30 Hz over time.   The CSA analysis is done and displayed continuously.  This is reviewed for asymmetries in power between homologous areas of the scalp and for presence of changes in power which canbe seen when seizures occur.  Sections of suspected abnormalities on the CSA is then compared with the original EEG recording.     [employer] software was also utilized in the review of this study.  This software suite analyzes the EEG recording in multiple domains.  Coherence and rhythmicity is computed to identify EEG sections which may contain organized seizures.  Each channel undergoes analysis to detect presence of spike and sharp waves which have special and  morphological characteristic of epileptic activity.  The routine EEG recording is converted from spacial into frequency domain.  This is then displayed comparing homologous areas to identify areas of significant asymmetry.  Algorithm to identify non-cortically generated artifact is used to separate eye movement, EMG and other artifact from the EEG.      Recording Times  Start on 1/22/2020, 07:00  Stop on 1/23/2020, 12:31    A total of 29 hours and 31 minutes of EEG was recorded.    EEG FINDINGS  Background activity:   The background rhythm was characterized by sharply contoured mixed frequency (delta-theta-alpha) activity  No posterior dominant alpha rhythm seen.   Symmetry and continuity: the background was continuous; asymmetry with left sided slowing persists         Sleep:   Not seen.    Activation procedures:   Photic stimulation and hyperventilation were not performed     Abnormal activity:   During the early parts of the study, intermittent periods of left sided epileptiform (sharp waves) periodic discharges are seen without any clear evolution or associated clinical correlate.         EKG:   Irregular rhythm seen.    IMPRESSION:   This is an abnormal C-EEG due to:  1) occasional left sided epileptiform discharges seen, suggestive of underlying epileptiform potential   2) asymmetry with left sided slowing, suggestive of underlying structural lesion  3) moderate to severe generalized slowing seen, suggestive of moderate to severe diffuse or multifocal cerebral dysfunction  No electrographic seizures seen.    Improved from prior day  CLINICAL CORRELATION IS RECOMMENDED.    Lolis Vazquez MD, DAYANA(), APRIL STUBBS.  Neurology-Epilepsy.  [facility].

## 2020-01-24 NOTE — PLAN OF CARE
CMICU DAILY GOALS       A: Awake    RASS: Goal - RASS Goal: 0-->alert and calm  Actual - RASS (Maddox Agitation-Sedation Scale): 0-->alert and calm(pt on no sedation )   Restraint necessity: Clinical Justification: Treatment Interference  B: Breath   SBT: Not attempted   C: Coordinate A & B, analgesics/sedatives   Pain: managed    SAT: Not attempted  D: Delirium   CAM-ICU: Overall CAM-ICU: Positive  E: Early Mobility   MOVE Screen: Pass   Activity: Activity Management: bedrest maintained per order  FAS: Feeding/Nutrition   Diet order: Diet/Nutrition Received: NPO, tube feeding,   Fluid restriction:    T: Thrombus   DVT prophylaxis: VTE Required Core Measure: Pharmacological prophylaxis initiated/maintained  H: HOB Elevation   Head of Bed (HOB): HOB at 30-45 degrees  U: Ulcer Prophylaxis   GI: yes  G: Glucose control   managed Glycemic Management: blood glucose monitoring  S: Skin   Bundle compliance: yes   Bathing/Skin Care: back care, bath, chlorhexidine, bath, complete, dressed/undressed, incontinence care Date: 1/23/20 day bath   B: Bowel Function   no issues   I: Indwelling Catheters   Arcos necessity: [REMOVED]      Urethral Catheter 01/20/20 1950 Straight-tip 16 Fr.-Reason for Continuing Urinary Catheterization: Critically ill in ICU requiring intensive monitoring   CVC necessity: No   IPAD offered: Not appropriate  D: De-escalation Antibx   Yes  Plan for the day   Monitor neuro status  Family/Goals of care/Code Status   Code Status: Full Code     No acute events throughout day, VS and assessment per flow sheet, patient progressing towards goals as tolerated, plan of care reviewed with Vaughn Retana and family, all concerns addressed, will continue to monitor.

## 2020-01-24 NOTE — ASSESSMENT & PLAN NOTE
[place] [facility] [place]>55%, bi-atrial enlargement

## 2020-01-24 NOTE — PROGRESS NOTES
[facility]  Critical Care Medicine  Progress Note    Patient Name: Vaughn Retana  MRN: [id number]  Admission Date: 1/20/2020  Hospital Length of Stay: 4 days  Code Status: Full Code  Attending Provider: Hero Burton*  Primary Care Provider: Cori Randall MD   Principal Problem: Encephalopathy    Subjective:     HPI:  Patient is a 54 year old male with extensive medical history including ESRD on dialysis MWF (has not received it today), L below knee amputation 2/2 osteomyelitis, dCHF (last echo 8/2/19 [facility]), GERD, h/o multiple ICH w/o residual deficits, and multiple instances of GI bleed (last EGD 11/12/19, esophagitis) who presents to ED [facility] nursing home due to altered mental status. From NH report, nursing home staff went to wake the patient for his morning dialysis. He was confused, lethargic, had a moderate amount of brown colored emesis in his trash can. Patient last got dialysis on Friday. Patient is normally able to ambulate on his own and is alert and oriented; nursing staff reports that he appeared normal yesterday.       At the time of ICU consult, initial work up showed largely unremarkable cbc with no leukocytosis. CMP significant for a bicarb of 36; patient is ESRD with creatinine of 10.8. Patient still makes some urine, and UA was without evidence for UTI. Lactic acid acid was mildly elevated at 2.4 and troponin mildly elevated at .348->.339. INR is at 1.2. Alcohol and drug screen negative. Patient was given versed prior to undergoing CT scan; which showed no acute intraparenchymal hemorrhage or major vascular distribution, senescent changes, and remote lacunar type basal ganglia infarct. Shortly after CT, patient had a witnessed tonic clonic seizure. He received 2 mg of ativan and 1500 of keppra. Patient became non-responsive afterwards and was severely hypertensive with systolic bp in the 200's. Patient was intubated for airway protection and started on  propofol and Cardene drip for hypertension. Family updated over the phone and at bedside.                Hospital/ICU Course:  Patient admitted to critical care medicine on 1/20 with concerns of new onset seizures and s/p intubation for airway protection. Patient was started on vanc, rocephin, ampicillin, and acyclovir to cover for meningitis. Patient had spot EEG while on propofol in ED which did not show seizure activity. MRI done showed chronic changes but no acute abnormalities. Patient was taken off cardene drip soon after he arrived in ICU. Lumbar puncture was done after MRI. CSF labs were not convincing for bacterial or viral meningitis; although cultures are pending. Continuous EEG was done after propofol was stopped which showed epileptiform discharges. Per epilepsy, patient was given another dose of Keppra, will follow up after checking levels as patient is ESRD. Nephrology consulted and started HD. Pt became febrile the night of 01/22; blood cultures repeated and restarted on Vanc and Zosyn. HSV PCR neg so Acyclovir discontinued.     Interval History/Significant Events:   NAEON  Off all sedation. Neuro status unchanged. Intubated, PEEP 5 and FiO2 21%.        Review of Systems   Unable to perform ROS: Intubated     Objective:     Vital Signs (Most Recent):  Temp: 98.9 °F (37.2 °C) (01/24/20 0300)  Pulse: 95 (01/24/20 0700)  Resp: 15 (01/24/20 0700)  BP: (!) 167/70 (01/24/20 0700)  SpO2: 95 % (01/24/20 0700) Vital Signs (24h Range):  Temp:  [98.9 °F (37.2 °C)-99.2 °F (37.3 °C)] 98.9 °F (37.2 °C)  Pulse:  [] 95  Resp:  [13-32] 15  SpO2:  [88 %-100 %] 95 %  BP: (125-181)/() 167/70   Weight: 57 kg (125 lb 10.6 oz)  Body mass index is 20.28 kg/m².      Intake/Output Summary (Last 24 hours) at 1/24/2020 0740  Last data filed at 1/24/2020 0700  Gross per 24 hour   Intake 630 ml   Output --   Net 630 ml       Physical Exam   Constitutional: No distress.   HENT:   Head: Normocephalic and atraumatic.    Mouth/Throat: No oropharyngeal exudate.   Mild lower lip swelling   Eyes: Pupils are equal, round, and reactive to light.   Neck: No JVD present.   Cardiovascular: Regular rhythm and normal heart sounds. Tachycardia present.   No murmur heard.  Pulmonary/Chest:   Vent Mode: Spont  Oxygen Concentration (%):  (21) 21  Resp Rate Total:  (14 br/min-31 br/min) 18 br/min  Vt Set:  (340 mL) 340 mL  PEEP/CPAP:  (5 cmH20) 5 cmH20  Pressure Support:  (10 cmH20) 10 cmH20  Mean Airway Pressure:  (7.9 pjV80-78 cmH20) 8.4 cmH20   Abdominal: Soft. He exhibits no distension.   Musculoskeletal:   Left below knee amputation   Neurological:   PEERL. Does not track with his eyes. Only withdraws RLE to pain. Cough, gag and corneal reflexes intact.    Skin: Skin is warm and dry. Capillary refill takes less than 2 seconds.   Vitals reviewed.      Vents:  Vent Mode: A/C (01/24/20 0541)  Ventilator Initiated: Yes (01/20/20 1327)  Set Rate: 12 bmp (01/24/20 0541)  Vt Set: 340 mL (01/24/20 0541)  Pressure Support: 0 cmH20 (01/20/20 2000)  PEEP/CPAP: 5 cmH20 (01/24/20 0541)  Oxygen Concentration (%): 21 (01/24/20 0700)  Peak Airway Pressure: 31 cmH2O (01/24/20 0541)  Plateau Pressure: 0 cmH20 (01/24/20 0541)  Total Ve: 7.32 mL (01/24/20 0541)  F/VT Ratio<105 (RSBI): (!) 65.75 (01/24/20 0541)  Lines/Drains/Airways     Drain                 Hemodialysis AV Fistula Right upper arm -- days         Hemodialysis AV Fistula Right upper arm -- days         NG/OG Tube 01/20/20 1932 [place] sump Right nostril 3 days          Airway                 Airway - Non-Surgical 01/20/20 1325 Endotracheal Tube-Hi/Lo 3 days          Peripheral Intravenous Line                 Peripheral IV - Single Lumen 01/20/20 1320 20 G Left Other 3 days         Peripheral IV - Single Lumen 01/21/20 0348 20 G Anterior;Left Forearm 3 days         Peripheral IV - Single Lumen 01/22/20 0012 20 G Anterior;Left;Proximal Forearm 2 days              Significant Labs:    CBC/Anemia  Profile:  Recent Labs   Lab 01/23/20  0221 01/23/20  1700 01/24/20  0334   WBC 9.71 8.51 9.69   HGB 13.5* 13.7* 12.8*   HCT 43.4 43.6 40.1    181 209   MCV 91 90 90   RDW 16.2* 16.2* 16.1*        Chemistries:  Recent Labs   Lab 01/22/20  1637 01/23/20  0221 01/24/20  0334    138 138   K 4.4 4.2 4.5   CL 97 97 96   CO2 30* 26 22*   BUN 33* 48* 80*   CREATININE 6.2* 7.5* 9.8*   CALCIUM 10.2 9.6 9.9   ALBUMIN 3.1* 3.0* 2.8*   PROT  --  8.8* 8.6*   BILITOT  --  0.4 0.4   ALKPHOS  --  223* 202*   ALT  --  9* 16   AST  --  18 34   MG 2.2 2.2 2.3   PHOS 4.7* 4.9* 5.7*       All pertinent labs within the past 24 hours have been reviewed.    Significant Imaging:  I have reviewed all pertinent imaging results/findings within the past 24 hours.      ABG  Recent Labs   Lab 01/21/20  2334   PH 7.361   PO2 40   PCO2 53.2*   HCO3 30.1*   BE 5     Assessment/Plan:     Neuro  Seizure  Altered mental status    Patient initially presented to ED prior to getting dialysis due to AMS and brown emesis. Had witnessed tonic clonic seizure requiring ativan shortly after getting a CT head. He was loaded with 1500 mg Keppra, intubated for airway protection, and started on propofol for sedation. Upon physical exam, patient remained unresponsive to verbal or tactile stimuli and had upward left sided gaze with sluggish pupils. Concern for status epilepticus. Differential diagnosis includes polysubstance, sepsis, intracranial abnormalities, and PRES.    - Spot EEG without evidence of current seizure. However patient already received keppra, ativan, and sedated on propofol. 24 hour EEG with L sided structural lesion and underlying epileptiform potential. Initiated 1500 mg keppra on 01/22, per Neurology recs. Continue Keppra 500 mg BID.   - Substance induced: UDS negative, alcohol level wnl  - Sepsis: Initial lactate 2.4, likely due to seizure event; repeat was 1.9. Patient received 500 ml saline in ED. Initial blood cultures, sputum  culture+gram stain neg  Lumbar puncture done, CSF not convincing for meningitis, so ampicillin, vancomycin, rocephin d/c'd on 01/22. However, pt was febrile on 11/23 to 101.3. Blood cultures repeated and restarted on Vanc and Zosyn. Repeat cultures NGTD, continue Vanc and Zosyn. HSV PCR neg; Acyclovir discontinued.   - Intracranial: patient with history of ICH with no functional deficits. Possibly multiple foci for seizure overtime. CT without acute changes, MRI brain with chronic changes and hypertensive angiopathy; no acute changes.  - PRES: Patient off cardene drip. MRI reviewed. Will follow blood pressure as patient is normally hypotensive.        Cardiac/Vascular  Renovascular hypertension  Patient on coreg at nursing home; has been compliant as given by nursing home. Patient with hypertensive emergency on admit and started Cardene drip to titrate to BP of 160-180. Off Cardene drip since 01/20.    Chronic diastolic heart failure  Last echo done at [facility] 8/2/19, EF>55%, bi-atrial enlargement      Renal/  ESRD on hemodialysis  Patient normally MWF dialysis. Last full dialysis session done last Friday 01/17/20.   Nephrology following for HD management.       Endocrine  Severe malnutrition  Nutrition consulted  On Novasource @ 30 mL/hr to provide 1440 kcals, 65 g of protein, 516 mL fluid.     GI  Gastrointestinal hemorrhage with hematemesis  GERD with esophagitis    Patient with reports of coffee ground emesis prior to transport to ED. In ED, dark brown contents suctioned from stomach. Hemoglobin remains stable at 15 and patient is hypertensive. Patient is well known to GI. His most recent scope was in November that just showed esophagitis similar to his previous EGD. GI consulted and appreciate recommendations. He received 80 mg IV pantoprazole, currently on 40 mg IV BID.      - BID CBC, transfuse as needed  - Continue Pantoprazole 40 mg IV BID       Critical Care Daily Checklist:    A: Awake: RASS Goal/Actual  Goal: RASS Goal: 0-->alert and calm  Actual: Maddox Agitation Sedation Scale (RASS): Drowsy   B: Spontaneous Breathing Trial Performed?     C: SAT & SBT Coordinated?  n/a                      D: Delirium: CAM-ICU Overall CAM-ICU: Positive   E: Early Mobility Performed? Yes   F: Feeding Goal: Goals: Meet % EEN, EPN  Status: Nutrition Goal Status: new   Current Diet Order   No orders of the defined types were placed in this encounter.      AS: Analgesia/Sedation n/a   T: Thromboembolic Prophylaxis Lovenox   H: HOB > 300 Yes   U: Stress Ulcer Prophylaxis (if needed) Protonix    G: Glucose Control n/a   B: Bowel Function Stool Occurrence: 1   I: Indwelling Catheter (Lines & Arcos) Necessity Arcos and PIV   D: De-escalation of Antimicrobials/Pharmacotherapies n/a    Plan for the day/ETD n/a    Code Status:  Family/Goals of Care: Full Code         Critical secondary to Patient has a condition that poses threat to life and bodily function: Severe Respiratory Distress      Critical care was time spent personally by me on the following activities: development of treatment plan with patient or surrogate and bedside caregivers, discussions with consultants, evaluation of patient's response to treatment, examination of patient, ordering and performing treatments and interventions, ordering and review of laboratory studies, ordering and review of radiographic studies, pulse oximetry, re-evaluation of patient's condition. This critical care time did not overlap with that of any other provider or involve time for any procedures.     Danielle Arnold MD  Critical Care Medicine  [facility]

## 2020-01-24 NOTE — ASSESSMENT & PLAN NOTE
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 Neurology was consulted for possible seizure like activity that was witnessed on the table during CTH examination. Unclear of seizure semiology. Mentions GTC however, primary team nor nurse unable to tell me what the seizure looked like. Patient was then emergently intubated as there was concern for status. Patient was then loaded with 2 mg ativan, 1500 mg of keppra and 1 mg of midazolam. He was then started on propofol. Spot EEG after the initiation of these medications reveal generealized background suppression with moderate to severe encephalopathy. There were no epileptiform discharges noted. Patient empirically started on antibiotics for meningitis however, CSF studies thus far unrevealing for infection. Please note on arrival blood pressure was 217/132. BUN/Cr elevated to 58/10.8. Patient missed his dialysis yesterday. MRI unrevealing for any acute processes     Differential Diagnosis   Cause for his encephalopathy includes hypertensive encephalopathy versus uremic encephalopathy versus seizures (due to previous cerebral abnormalities). Unclear if patient was in status when he initially came in as he was loaded with ativan, midazolam and keppra and then started on propofol. MRI brain however, was unrevealing for any acute processes. Per epilepsy attending note, after EEG was placed on patient (after he was off of the propofol), underlying structural lesion on the left, with underlying epileptiform potential on the left. MICU team then loaded the patient with 1500 mg. EEG from 1/22-1/23 showed occasional left sided  epileptiform discharges seen, suggestive of underlying epileptiform potential. However, less discharges than EEG that was done on 1/21-1/22.     Recommendations  1) Continue keppra 500 mg BID  2) Continue blood pressure control (SBP between 140-160)  3)Continue dialysis for uremic encephalopathy

## 2020-01-24 NOTE — ASSESSMENT & PLAN NOTE
The patient is a 53 yo AAM admitted to MICU with seizure activity after presenting to the ED on 1/20 after being found with alter mental status at his NH facility Monday morning. The patient is currently on continuous EEG monitoring. Last dialyze on MWF.     Nephrology History  iHD Schedule: MWF  Unit/MD: [facility]/ Dr. Lemus  Duration: 3.5 hrs  EDW: ?  Access: [facility] AVF   Resodial Renal Function: Yes (per records)    Plan:   -HD today for metabolic clearance/volume management  -UF 1L as tolerated  - Recommend daily renal function panels   - Renal diet or Novasource TF   - On mechanical ventilation with FiO at 21%  - Continue to monitor intake and output, daily weights   - Avoid nephrotoxic medication and renal dose medications to GFR  - Will discuss with

## 2020-01-24 NOTE — SUBJECTIVE & OBJECTIVE
Interval History:   NAEON.  Electrolytes/HDS.  Net positive ~800 ml/24h.  Minimal vent settings.    Review of patient's allergies indicates:   Allergen Reactions    Fosrenol [lanthanum] Nausea And Vomiting     Nausea and vomiting     Current Facility-Administered Medications   Medication Frequency    0.9%  NaCl infusion Once    acetaminophen tablet 650 mg Q6H PRN    carvedilol tablet 3.125 mg BID WM    chlorhexidine 0.12 % solution 15 mL BID    heparin (porcine) injection 5,000 Units Q8H    hydrALAZINE injection 5 mg Q8H PRN    levETIRAcetam in NaCl (iso-os) IVPB 500 mg Q12H    pantoprazole suspension 40 mg BID    piperacillin-tazobactam 4.5 g in sodium chloride 0.9% 100 mL IVPB (ready to mix system) Q12H    sevelamer carbonate pwpk 1.6 g TID    sodium chloride 0.9% flush 10 mL PRN    vancomycin (VANCOCIN) 500 mg in dextrose 5 % 100 mL IVPB Once    vancomycin - pharmacy to dose pharmacy to manage frequency       Objective:     Vital Signs (Most Recent):  Temp: 97 °F (36.1 °C) (01/24/20 1100)  Pulse: 90 (01/24/20 1403)  Resp: (!) 25 (01/24/20 1403)  BP: (!) 163/65 (01/24/20 1403)  SpO2: (!) 84 % (01/24/20 1403)  O2 Device (Oxygen Therapy): ventilator (01/24/20 1400) Vital Signs (24h Range):  Temp:  [97 °F (36.1 °C)-99 °F (37.2 °C)] 97 °F (36.1 °C)  Pulse:  [] 90  Resp:  [13-30] 25  SpO2:  [84 %-100 %] 84 %  BP: (134-190)/() 163/65     Weight: 57 kg (125 lb 10.6 oz) (01/24/20 1100)  Body mass index is 20.28 kg/m².  Body surface area is 1.63 meters squared.    I/O last 3 completed shifts:  In: 1330 [I.V.:230; NG/GT:700; IV Piggyback:400]  Out: 0     Physical Exam   Constitutional: He appears well-nourished. He appears ill. He is intubated.   HENT:   Head: Normocephalic and atraumatic.   Mouth/Throat: No oropharyngeal exudate.   Eyes: Right eye exhibits no discharge. Left eye exhibits no discharge. No scleral icterus.   Neck: Normal range of motion. Neck supple. No JVD present.    Cardiovascular: Regular rhythm and normal heart sounds.   No murmur heard.  Pulmonary/Chest: He is intubated. He has no rales.   Abdominal: Soft. Bowel sounds are normal. He exhibits no distension.   Musculoskeletal: Normal range of motion. He exhibits deformity (LBKA). He exhibits no edema.   Left below knee amputation   Neurological: He is alert.   Skin: Skin is warm and dry.   Vitals reviewed.      Significant Labs:  ABGs:   Recent Labs   Lab 01/21/20  2334   PH 7.361   PCO2 53.2*   HCO3 30.1*   POCSATURATED 72*   BE 5     CBC:   Recent Labs   Lab 01/24/20  0334   WBC 9.69   RBC 4.48*   HGB 12.8*   HCT 40.1      MCV 90   MCH 28.6   MCHC 31.9*     CMP:   Recent Labs   Lab 01/24/20  0334   *   CALCIUM 9.9   ALBUMIN 2.8*   PROT 8.6*      K 4.5   CO2 22*   CL 96   BUN 80*   CREATININE 9.8*   ALKPHOS 202*   ALT 16   AST 34   BILITOT 0.4

## 2020-01-24 NOTE — ASSESSMENT & PLAN NOTE
GERD with esophagitis    Patient with reports of coffee ground emesis prior to transport to ED. In ED, dark brown contents suctioned from stomach. Hemoglobin remains stable at 15 and patient is hypertensive. Patient is well known to GI. His most recent scope was in November that just showed esophagitis similar to his previous EGD. GI consulted and appreciate recommendations. He received 80 mg IV pantoprazole, currently on 40 mg IV BID.      - BID CBC, transfuse as needed  - Continue Pantoprazole 40 mg IV BID

## 2020-01-24 NOTE — PLAN OF CARE
CMICU DAILY GOALS       A: Awake    RASS: Goal - RASS Goal: 0-->alert and calm  Actual - RASS (Maddox Agitation-Sedation Scale): -1-->drowsy   Restraint necessity: Clinical Justification: Treatment Interference  B: Breath   SBT: Did well on CPAP however not awake enough to consider extubation at this time.   C: Coordinate A & B, analgesics/sedatives   Pain: managed    SAT: No alert enough to extubate  D: Delirium   CAM-ICU: Overall CAM-ICU: Positive  E: Early Mobility   MOVE Screen: Fail   Activity: Activity Management: bedrest maintained per order  FAS: Feeding/Nutrition   Diet order: Diet/Nutrition Received: tube feeding,   Fluid restriction:    T: Thrombus   DVT prophylaxis: VTE Required Core Measure: Pharmacological prophylaxis initiated/maintained  H: HOB Elevation   Head of Bed (HOB): HOB at 30-45 degrees  U: Ulcer Prophylaxis   GI: N/A   G: Glucose control   managed Glycemic Management: blood glucose monitoring  S: Skin   Bundle compliance: yes   Bathing/Skin Care: bath, complete, bath, chlorhexidine, linen changed Date: 1/24/20 @ 7572  B: Bowel Function   constipation   I: Indwelling Catheters   Arcos necessity: [REMOVED]      Urethral Catheter 01/20/20 1950 Straight-tip 16 Fr.-Reason for Continuing Urinary Catheterization: Critically ill in ICU requiring intensive monitoring   CVC necessity: No   IPAD offered: Not appropriate  D: De-escalation Antibx   No  Plan for the day   Pt has slightly improved regarding Neurological status in that he looks my way when saying his name. He does withdraw to pain and is beginning to move upper extremities more, but intermittently. Pt able to tolerate CPAP mode all shift on Ventilator with no complications. Still not alert enough to extubate and has large amount of thick secretions through the shift. Neuro following. Coreg re-started this shift as pt continued to be hypertensive 180-190's systolic. Hydralazine PRN also ordered for systolic over 180. Continue to monitor  Neuro status.   Family/Goals of care/Code Status   Code Status: Full Code     No acute events throughout day, VS and assessment per flow sheet, patient progressing towards goals as tolerated, plan of care reviewed with Vaughn Retana and family, all concerns addressed, will continue to monitor.

## 2020-01-24 NOTE — PROGRESS NOTES
Pharmacokinetic Assessment Follow Up: IV Vancomycin    Vancomycin serum concentration assessment(s):    The random level was drawn correctly and can be used to guide therapy at this time. The measurement is within the desired definitive target range of 15 to 20 mcg/mL.      Vancomycin Regimen Plan:    Further vancomycin dosing will be dependent upon HD plans. Patient will receive vancomycin 500 mg IV following HD today. Collect vancomycin level with AM labs on 01/25.    Drug levels (last 3 results):  Recent Labs   Lab Result Units 01/22/20  0228 01/23/20  0221 01/24/20  0334   Vancomycin, Random ug/mL 25.3 19.1 19.1       Pharmacy will continue to follow and monitor vancomycin.    Please contact pharmacy at extension [phone] for questions regarding this assessment.    Thank you for the consult,   Ivan VieiraD. Candidate 2020       Patient brief summary:  Vaughn Retana is a 54 y.o. male initiated on antimicrobial therapy with IV Vancomycin for treatment of lower respiratory infection    The patient's current regimen is vancomycin 500 mg following HD.    Drug Allergies:   Review of patient's allergies indicates:   Allergen Reactions    Fosrenol [lanthanum] Nausea And Vomiting     Nausea and vomiting       Actual Body Weight:   57 kg    Renal Function:   Estimated Creatinine Clearance: 6.9 mL/min (A) (based on SCr of 9.8 mg/dL (H)).,     Dialysis Method (if applicable):  intermittent HD

## 2020-01-24 NOTE — ASSESSMENT & PLAN NOTE
Altered mental status    Patient initially presented to ED prior to getting dialysis due to AMS and brown emesis. Had witnessed tonic clonic seizure requiring ativan shortly after getting a CT head. He was loaded with 1500 mg Keppra, intubated for airway protection, and started on propofol for sedation. Upon physical exam, patient remained unresponsive to verbal or tactile stimuli and had upward left sided gaze with sluggish pupils. Concern for status epilepticus. Differential diagnosis includes polysubstance, sepsis, intracranial abnormalities, and PRES.    - Spot EEG without evidence of current seizure. However patient already received keppra, ativan, and sedated on propofol. 24 hour EEG with L sided structural lesion and underlying epileptiform potential. Initiated 1500 mg keppra on 01/22, per Neurology recs. Continue Keppra 500 mg BID.   - Substance induced: UDS negative, alcohol level wnl  - Sepsis: Initial lactate 2.4, likely due to seizure event; repeat was 1.9. Patient received 500 ml saline in ED. Initial blood cultures, sputum culture+gram stain neg  Lumbar puncture done, CSF not convincing for meningitis, so ampicillin, vancomycin, rocephin d/c'd on 01/22. However, pt was febrile on 11/23 to 101.3. Blood cultures repeated and restarted on Vanc and Zosyn. Repeat cultures NGTD, continue Vanc and Zosyn. HSV PCR neg; Acyclovir discontinued.   - Intracranial: patient with history of ICH with no functional deficits. Possibly multiple foci for seizure overtime. CT without acute changes, MRI brain with chronic changes and hypertensive angiopathy; no acute changes.  - PRES: Patient off cardene drip. MRI reviewed. Will follow blood pressure as patient is normally hypotensive.

## 2020-01-24 NOTE — PLAN OF CARE
Pt with no significant events overnight . Remains minimally responsive. Alert but does not following any commands. POC to extubate when neurological state stable. POC discussed with pt with no evidence of learning. WCTM.

## 2020-01-24 NOTE — PROGRESS NOTES
[facility]  Neurology  Progress Note    Patient Name: Vaughn Retana  MRN: [id number]  Admission Date: 1/20/2020  Hospital Length of Stay: 4 days  Code Status: Full Code   Attending Provider: Hero Burton*  Primary Care Physician: Cori Randall MD   Principal Problem:Encephalopathy      Subjective:     Interval History: Patient BUN/Cr elevated today. He is also hypertensive. Mental status has slightly improved compared to yesterday (patient responsive to verbal and painful stimuli).     Current Neurological Medications:     Current Facility-Administered Medications   Medication Dose Route Frequency Provider Last Rate Last Dose    0.9%  NaCl infusion   Intravenous Once Glenis Benavidez DNP, FNP-C        acetaminophen tablet 650 mg  650 mg Per NG tube Q6H PRN Yomaira Cesar NP   650 mg at 01/22/20 2010    carvedilol tablet 3.125 mg  3.125 mg Per OG tube BID  Adore Stephens PA-C   3.125 mg at 01/24/20 1215    chlorhexidine 0.12 % solution 15 mL  15 mL Mouth/Throat BID Bharat Ramirez NP   15 mL at 01/24/20 0845    heparin (porcine) injection 5,000 Units  5,000 Units Subcutaneous Q8H Danielle Arnold MD        hydrALAZINE injection 5 mg  5 mg Intravenous Q8H PRN Danielle Arnold MD        levETIRAcetam in NaCl (iso-os) IVPB 500 mg  500 mg Intravenous Q12H Dakota Alfredo  mL/hr at 01/24/20 0845 500 mg at 01/24/20 0845    pantoprazole suspension 40 mg  40 mg Per NG tube BID Hero Burton MD   40 mg at 01/24/20 0845    piperacillin-tazobactam 4.5 g in sodium chloride 0.9% 100 mL IVPB (ready to mix system)  4.5 g Intravenous Q12H Danielle Arnold MD 25 mL/hr at 01/24/20 0943 4.5 g at 01/24/20 0943    sevelamer carbonate pwpk 1.6 g  1.6 g Per OG tube TID Bryan Merrill MD   1.6 g at 01/24/20 0845    sodium chloride 0.9% flush 10 mL  10 mL Intravenous PRN Colt Gonzales MD        vancomycin - pharmacy to dose   Intravenous pharmacy  to manage frequency Danielle Arnold MD           Review of Systems   Unable to perform ROS: Intubated   Constitutional: Negative for fever.   Musculoskeletal: Positive for gait problem.   Skin: Positive for rash.     Objective:     Vital Signs (Most Recent):  Temp: 97 °F (36.1 °C) (01/24/20 1100)  Pulse: 90 (01/24/20 1403)  Resp: (!) 25 (01/24/20 1403)  BP: (!) 163/65 (01/24/20 1403)  SpO2: (!) 84 % (01/24/20 1403) Vital Signs (24h Range):  Temp:  [97 °F (36.1 °C)-99.2 °F (37.3 °C)] 97 °F (36.1 °C)  Pulse:  [] 90  Resp:  [13-30] 25  SpO2:  [84 %-100 %] 84 %  BP: (129-190)/() 163/65     Weight: 57 kg (125 lb 10.6 oz)  Body mass index is 20.28 kg/m².    Physical Exam   Eyes: Pupils are equal, round, and reactive to light.   Neurological:   Reflex Scores:       Tricep reflexes are 1+ on the right side and 1+ on the left side.       Bicep reflexes are 1+ on the right side and 1+ on the left side.       Brachioradialis reflexes are 1+ on the right side and 1+ on the left side.       Patellar reflexes are 1+ on the right side and 1+ on the left side.       Achilles reflexes are 1+ on the right side and 1+ on the left side.      NEUROLOGICAL EXAMINATION:     MENTAL STATUS        Patient currently intubated however not sedated. Although he is awake he is not following any commands. He is responsive to verbal and painful stimuli.      CRANIAL NERVES     CN III, IV, VI   Pupils are equal, round, and reactive to light.  Nystagmus: none   Ophthalmoparesis: none       Unable to follow commands for full neurologic exam      REFLEXES     Reflexes   Right brachioradialis: 1+  Left brachioradialis: 1+  Right biceps: 1+  Left biceps: 1+  Right triceps: 1+  Left triceps: 1+  Right patellar: 1+  Left patellar: 1+  Right achilles: 1+  Left achilles: 1+    GAIT AND COORDINATION        Deferred        Significant Labs:   BMP:   Recent Labs   Lab 01/22/20  1637 01/23/20  0221 01/24/20  0334   * 195* 177*    138 138    K 4.4 4.2 4.5   CL 97 97 96   CO2 30* 26 22*   BUN 33* 48* 80*   CREATININE 6.2* 7.5* 9.8*   CALCIUM 10.2 9.6 9.9   MG 2.2 2.2 2.3     CBC:   Recent Labs   Lab 01/23/20  0221 01/23/20  1700 01/24/20  0334   WBC 9.71 8.51 9.69   HGB 13.5* 13.7* 12.8*   HCT 43.4 43.6 40.1    181 209     CMP:   Recent Labs   Lab 01/22/20  1637 01/23/20  0221 01/24/20  0334   * 195* 177*    138 138   K 4.4 4.2 4.5   CL 97 97 96   CO2 30* 26 22*   BUN 33* 48* 80*   CREATININE 6.2* 7.5* 9.8*   CALCIUM 10.2 9.6 9.9   MG 2.2 2.2 2.3   PROT  --  8.8* 8.6*   ALBUMIN 3.1* 3.0* 2.8*   BILITOT  --  0.4 0.4   ALKPHOS  --  223* 202*   AST  --  18 34   ALT  --  9* 16   ANIONGAP 14 15 20*   EGFRNONAA 9.3* 7.4* 5.3*       Significant Imaging: I have reviewed all pertinent imaging results/findings within the past 24 hours.    Assessment and Plan:     * Encephalopathy  51 y/o male with a medical history of ESRD (MWF), left below the knee amputation (2/2 osteomyelitis), CHF, multiple ICH with extensive cerebral microbleeds (5/203- left BG, 9/2016 right basal ganglia hemorrhage, 11/2016  R striatocapsular ICH with IVH) and previous GI bleeds ( EGD 2019 shows esophagitis) presented to the ED on 1/20/20 from [facility] due to altered mental status. Neurology was consulted for possible seizure like activity that was witnessed on the table during CTH examination. Unclear of seizure semiology. Mentions GTC however, primary team nor nurse unable to tell me what the seizure looked like. Patient was then emergently intubated as there was concern for status. Patient was then loaded with 2 mg ativan, 1500 mg of keppra and 1 mg of midazolam. He was then started on propofol. Spot EEG after the initiation of these medications reveal generealized background suppression with moderate to severe encephalopathy. There were no epileptiform discharges noted. Patient empirically started on antibiotics for meningitis however, CSF studies  thus far unrevealing for infection. Please note on arrival blood pressure was 217/132. BUN/Cr elevated to 58/10.8. Patient missed his dialysis yesterday. MRI unrevealing for any acute processes     Differential Diagnosis   Cause for his encephalopathy includes hypertensive encephalopathy versus uremic encephalopathy versus seizures (due to previous cerebral abnormalities). Unclear if patient was in status when he initially came in as he was loaded with ativan, midazolam and keppra and then started on propofol. MRI brain however, was unrevealing for any acute processes. Per epilepsy attending note, after EEG was placed on patient (after he was off of the propofol), underlying structural lesion on the left, with underlying epileptiform potential on the left. MICU team then loaded the patient with 1500 mg. EEG from 1/22-1/23 showed occasional left sided epileptiform discharges seen, suggestive of underlying epileptiform potential. However, less discharges than EEG that was done on 1/21-1/22.     Recommendations  1) Continue keppra 500 mg BID  2) Continue blood pressure control (SBP between 140-160)  3)Continue dialysis for uremic encephalopathy        Seizure  EEG noted on 01/22/20:  EEG note below:     EEG noted abnormal C-EEG due to   1. occasional bilateral (left > right) epileptiform discharges seen, suggestive of underlying epileptiform potential,   2. asymmetry with prominent left sided slowing, suggestive of underlying structural lesion and   3. severe generalized slowing seen, suggestive of severe diffuse or multifocal cerebral dysfunction. No electrographic seizures seen.         Seizures/Epilepsy Monitoring Evaluation:  - Continue current management.  - Neurochecks Q 1hr  - Seizure Precautions  - Continue vEEG monitoring   - Loaded with Keppra 1500mg on 01/21/20  -Continue Maintenance dose 500mg Q12hrs  Will continue to follow patient closely. If patient does not improve clinically, will place EEG again        Gastrointestinal hemorrhage with hematemesis  Continue present mgt per primary team    GERD with esophagitis  Continue present mgt per primary team    Severe malnutrition  Continue present mgt per primary team    ESRD on hemodialysis  Continue dialysis per primary team and Nephrology        VTE Risk Mitigation (From admission, onward)         Ordered     heparin (porcine) injection 5,000 Units  Every 8 hours      01/24/20 1101     Place sequential compression device  Until discontinued      01/20/20 1503     IP VTE HIGH RISK PATIENT  Once      01/20/20 1503                Rhona Garcia MD  Neurology  [facility]

## 2020-01-24 NOTE — PROGRESS NOTES
[facility]  Nephrology  Progress Note    Patient Name: Vaughn Retana  MRN: [id number]  Admission Date: 1/20/2020  Hospital Length of Stay: 4 days  Attending Provider: Hero Burton*   Primary Care Physician: Cori Randall MD  Principal Problem:Encephalopathy    Subjective:     Interval History:   NAEON.  Electrolytes/HDS.  Net positive ~800 ml/24h.  Minimal vent settings.    Review of patient's allergies indicates:   Allergen Reactions    Fosrenol [lanthanum] Nausea And Vomiting     Nausea and vomiting     Current Facility-Administered Medications   Medication Frequency    0.9%  NaCl infusion Once    acetaminophen tablet 650 mg Q6H PRN    carvedilol tablet 3.125 mg BID WM    chlorhexidine 0.12 % solution 15 mL BID    heparin (porcine) injection 5,000 Units Q8H    hydrALAZINE injection 5 mg Q8H PRN    levETIRAcetam in NaCl (iso-os) IVPB 500 mg Q12H    pantoprazole suspension 40 mg BID    piperacillin-tazobactam 4.5 g in sodium chloride 0.9% 100 mL IVPB (ready to mix system) Q12H    sevelamer carbonate pwpk 1.6 g TID    sodium chloride 0.9% flush 10 mL PRN    vancomycin (VANCOCIN) 500 mg in dextrose 5 % 100 mL IVPB Once    vancomycin - pharmacy to dose pharmacy to manage frequency       Objective:     Vital Signs (Most Recent):  Temp: 97 °F (36.1 °C) (01/24/20 1100)  Pulse: 90 (01/24/20 1403)  Resp: (!) 25 (01/24/20 1403)  BP: (!) 163/65 (01/24/20 1403)  SpO2: (!) 84 % (01/24/20 1403)  O2 Device (Oxygen Therapy): ventilator (01/24/20 1400) Vital Signs (24h Range):  Temp:  [97 °F (36.1 °C)-99 °F (37.2 °C)] 97 °F (36.1 °C)  Pulse:  [] 90  Resp:  [13-30] 25  SpO2:  [84 %-100 %] 84 %  BP: (134-190)/() 163/65     Weight: 57 kg (125 lb 10.6 oz) (01/24/20 1100)  Body mass index is 20.28 kg/m².  Body surface area is 1.63 meters squared.    I/O last 3 completed shifts:  In: 1330 [I.V.:230; NG/GT:700; IV Piggyback:400]  Out: 0     Physical Exam   Constitutional: He  appears well-nourished. He appears ill. He is intubated.   HENT:   Head: Normocephalic and atraumatic.   Mouth/Throat: No oropharyngeal exudate.   Eyes: Right eye exhibits no discharge. Left eye exhibits no discharge. No scleral icterus.   Neck: Normal range of motion. Neck supple. No JVD present.   Cardiovascular: Regular rhythm and normal heart sounds.   No murmur heard.  Pulmonary/Chest: He is intubated. He has no rales.   Abdominal: Soft. Bowel sounds are normal. He exhibits no distension.   Musculoskeletal: Normal range of motion. He exhibits deformity (LBKA). He exhibits no edema.   Left below knee amputation   Neurological: He is alert.   Skin: Skin is warm and dry.   Vitals reviewed.      Significant Labs:  ABGs:   Recent Labs   Lab 01/21/20  2334   PH 7.361   PCO2 53.2*   HCO3 30.1*   POCSATURATED 72*   BE 5     CBC:   Recent Labs   Lab 01/24/20  0334   WBC 9.69   RBC 4.48*   HGB 12.8*   HCT 40.1      MCV 90   MCH 28.6   MCHC 31.9*     CMP:   Recent Labs   Lab 01/24/20  0334   *   CALCIUM 9.9   ALBUMIN 2.8*   PROT 8.6*      K 4.5   CO2 22*   CL 96   BUN 80*   CREATININE 9.8*   ALKPHOS 202*   ALT 16   AST 34   BILITOT 0.4            Assessment/Plan:     ESRD on hemodialysis  The patient is a 53 yo AAM admitted to MICU with seizure activity after presenting to the ED on 1/20 after being found with alter mental status at his NH facility Monday morning. The patient is currently on continuous EEG monitoring. Last dialyze on MWF.     Nephrology History  iHD Schedule: MWF  Unit/MD: [facility]/ Dr. Lemus  Duration: 3.5 hrs  EDW: ?  Access: [facility]   Resodial Renal Function: Yes (per records)    Plan:   -HD today for metabolic clearance/volume management  -UF 1L as tolerated  - Recommend daily renal function panels   - Renal diet or Novasource TF   - On mechanical ventilation with FiO at 21%  - Continue to monitor intake and output, daily weights   - Avoid nephrotoxic medication and renal  dose medications to GFR  - Will discuss with Dr.Mohamed Wade Weber, NP  Nephrology  [facility]

## 2020-01-24 NOTE — ASSESSMENT & PLAN NOTE
Patient on coreg at nursing home; has been compliant as given by nursing home. Patient with hypertensive emergency on admit and started Cardene drip to titrate to BP of 160-180. Off Cardene drip since 01/20.

## 2020-01-24 NOTE — PROGRESS NOTES
Neurology team at bedside for rounds. Would like pt's systolic BP to be 160 and under. Currently 182. Called PRATIK Pinto to notify. PA will re-start pt's Coreg.

## 2020-01-24 NOTE — SUBJECTIVE & OBJECTIVE
Subjective:     Interval History: Patient BUN/Cr elevated today. He is also hypertensive. Mental status has slightly improved compared to yesterday (patient responsive to verbal and painful stimuli).     Current Neurological Medications:     Current Facility-Administered Medications   Medication Dose Route Frequency Provider Last Rate Last Dose    0.9%  NaCl infusion   Intravenous Once Glenis Benavidez DNP, FNP-C        acetaminophen tablet 650 mg  650 mg Per NG tube Q6H PRN Yomaira BO Arsenio, NP   650 mg at 01/22/20 2010    carvedilol tablet 3.125 mg  3.125 mg Per OG tube BID  Adore Stephens PA-C   3.125 mg at 01/24/20 1215    chlorhexidine 0.12 % solution 15 mL  15 mL Mouth/Throat BID Bharat Ramirez NP   15 mL at 01/24/20 0845    heparin (porcine) injection 5,000 Units  5,000 Units Subcutaneous Q8H Danielle Arnold MD        hydrALAZINE injection 5 mg  5 mg Intravenous Q8H PRN Danielle Arnold MD        levETIRAcetam in NaCl (iso-os) IVPB 500 mg  500 mg Intravenous Q12H Dakota Alfredo  mL/hr at 01/24/20 0845 500 mg at 01/24/20 0845    pantoprazole suspension 40 mg  40 mg Per NG tube BID Hero Burton MD   40 mg at 01/24/20 0845    piperacillin-tazobactam 4.5 g in sodium chloride 0.9% 100 mL IVPB (ready to mix system)  4.5 g Intravenous Q12H Danielle Arnold MD 25 mL/hr at 01/24/20 0943 4.5 g at 01/24/20 0943    sevelamer carbonate pwpk 1.6 g  1.6 g Per OG tube TID Bryan Merrill MD   1.6 g at 01/24/20 0845    sodium chloride 0.9% flush 10 mL  10 mL Intravenous PRN Colt Gonzales MD        vancomycin - pharmacy to dose   Intravenous pharmacy to manage frequency Danielle Arnold MD           Review of Systems   Unable to perform ROS: Intubated   Constitutional: Negative for fever.   Musculoskeletal: Positive for gait problem.   Skin: Positive for rash.     Objective:     Vital Signs (Most Recent):  Temp: 97 °F (36.1 °C) (01/24/20 1100)  Pulse: 90 (01/24/20  1403)  Resp: (!) 25 (01/24/20 1403)  BP: (!) 163/65 (01/24/20 1403)  SpO2: (!) 84 % (01/24/20 1403) Vital Signs (24h Range):  Temp:  [97 °F (36.1 °C)-99.2 °F (37.3 °C)] 97 °F (36.1 °C)  Pulse:  [] 90  Resp:  [13-30] 25  SpO2:  [84 %-100 %] 84 %  BP: (129-190)/() 163/65     Weight: 57 kg (125 lb 10.6 oz)  Body mass index is 20.28 kg/m².    Physical Exam   Eyes: Pupils are equal, round, and reactive to light.   Neurological:   Reflex Scores:       Tricep reflexes are 1+ on the right side and 1+ on the left side.       Bicep reflexes are 1+ on the right side and 1+ on the left side.       Brachioradialis reflexes are 1+ on the right side and 1+ on the left side.       Patellar reflexes are 1+ on the right side and 1+ on the left side.       Achilles reflexes are 1+ on the right side and 1+ on the left side.      NEUROLOGICAL EXAMINATION:     MENTAL STATUS        Patient currently intubated however not sedated. Although he is awake he is not following any commands. He is responsive to verbal and painful stimuli.      CRANIAL NERVES     CN III, IV, VI   Pupils are equal, round, and reactive to light.  Nystagmus: none   Ophthalmoparesis: none       Unable to follow commands for full neurologic exam      REFLEXES     Reflexes   Right brachioradialis: 1+  Left brachioradialis: 1+  Right biceps: 1+  Left biceps: 1+  Right triceps: 1+  Left triceps: 1+  Right patellar: 1+  Left patellar: 1+  Right achilles: 1+  Left achilles: 1+    GAIT AND COORDINATION        Deferred        Significant Labs:   BMP:   Recent Labs   Lab 01/22/20  1637 01/23/20  0221 01/24/20  0334   * 195* 177*    138 138   K 4.4 4.2 4.5   CL 97 97 96   CO2 30* 26 22*   BUN 33* 48* 80*   CREATININE 6.2* 7.5* 9.8*   CALCIUM 10.2 9.6 9.9   MG 2.2 2.2 2.3     CBC:   Recent Labs   Lab 01/23/20  0221 01/23/20  1700 01/24/20  0334   WBC 9.71 8.51 9.69   HGB 13.5* 13.7* 12.8*   HCT 43.4 43.6 40.1    181 209     CMP:   Recent Labs    Lab 01/22/20  1637 01/23/20  0221 01/24/20  0334   * 195* 177*    138 138   K 4.4 4.2 4.5   CL 97 97 96   CO2 30* 26 22*   BUN 33* 48* 80*   CREATININE 6.2* 7.5* 9.8*   CALCIUM 10.2 9.6 9.9   MG 2.2 2.2 2.3   PROT  --  8.8* 8.6*   ALBUMIN 3.1* 3.0* 2.8*   BILITOT  --  0.4 0.4   ALKPHOS  --  223* 202*   AST  --  18 34   ALT  --  9* 16   ANIONGAP 14 15 20*   EGFRNONAA 9.3* 7.4* 5.3*       Significant Imaging: I have reviewed all pertinent imaging results/findings within the past 24 hours.

## 2020-01-24 NOTE — PROGRESS NOTES
"  [facility]  Adult Nutrition  Consult Note    SUMMARY     Recommendations    1. Continue current TF regimen of Novasource @ 30 mL/hr - meeting needs.  2. RD to monitor & follow-up.    Goals: Meet % EEN, EPN  Nutrition Goal Status: goal met  Communication of RD Recs: reviewed with RN    Reason for Assessment    Reason For Assessment: RD follow-up  Diagnosis: other (see comments)(Seizures)  Relevant Medical History: HTN, ESRD on HD, L. BKA  Interdisciplinary Rounds: did not attend    General Information Comments: Pt remains intubated, tolerating current TF regimen at goal via NGT. Pt on HD. NFPE complete  - pt meets criteria for severe malnutrition.   Nutrition Discharge Planning: Unable to determine    Nutrition Risk Screen    Nutrition Risk Screen: no indicators present    Nutrition/Diet History    Factors Affecting Nutritional Intake: NPO, on mechanical ventilation    Anthropometrics    Temp: 97 °F (36.1 °C)  Height: 5' 6" (167.6 cm)  Height (inches): 66 in  Weight Method: Stated  Weight: 57 kg (125 lb 10.6 oz)  Weight (lb): 125.66 lb  Ideal Body Weight (IBW), Male: 142 lb  % Ideal Body Weight, Male (lb): 88.49 %  BMI (Calculated): 20.3  BMI Grade: 18.5-24.9 - normal  Usual Body Weight (UBW), k kg  % Usual Body Weight: 86.54  % Weight Change From Usual Weight: -13.64 %  Amputation %: 5.9  Total Amputation %: 5.9  Amputation Ideal Body Weight (IBW), Male (lb): 136.1 lb  Amputee BMI (kg/m2): 21.61 kg/m2    Lab/Procedures/Meds    Pertinent Labs Reviewed: reviewed  Pertinent Labs Comments: BUN 80, Creat 9.8, GFR 6.2  Pertinent Medications Reviewed: reviewed  Pertinent Medications Comments: -    Estimated/Assessed Needs    Weight Used For Calorie Calculations: 57 kg (125 lb 10.6 oz)     Energy Calorie Requirements (kcal): 1483 kcal/d  Energy Need Method: [place]     Protein Requirements: 69-86 g/d (1.2-1.5 g/kg)  Weight Used For Protein Calculations: 57 kg (125 lb 10.6 oz) "     Estimated Fluid Requirement Method: other (see comments)(Per MD or 1 mL/kcal)    Nutrition Prescription Ordered    Current Diet Order: NPO  Current Nutrition Support Formula Ordered: Novasource Renal  Current Nutrition Support Rate Ordered: 30 mL/hr    Evaluation of Received Nutrient/Fluid Intake    Enteral Calories (kcal): 1440  Enteral Protein (gm): 65  Enteral (Free Water) Fluid (mL): 516    % Kcal Needs: 97%  % Protein Needs: 94%    Energy Calories Required: meeting needs  Protein Required: meeting needs  Fluid Required: other (see comments)(Per MD or 1 mL/kcal)    Comments: LBM: 1/22    Tolerance: tolerating    Nutrition Risk    Level of Risk/Frequency of Follow-up: (1x/week)     Assessment and Plan    Severe malnutrition    Nutrition Problem:  Severe Protein-Calorie Malnutrition  Malnutrition in the context of Chronic Illness/Injury    Related to (etiology):  Inadequate energy intake    Signs and Symptoms (as evidenced by):  Body Fat Depletion: severe depletion of orbitals and triceps   Muscle Mass Depletion: severe depletion of temples, clavicle region and interosseous muscle   Weight Loss: 14% x 6 months     Interventions(treatment strategy):  Collaboration of nutrition care w/ other providers    Nutrition Diagnosis Status:  Continues     Monitor and Evaluation    Food and Nutrient Intake: energy intake, food and beverage intake, enteral nutrition intake  Food and Nutrient Adminstration: diet order, enteral and parenteral nutrition administration  Physical Activity and Function: nutrition-related ADLs and IADLs  Anthropometric Measurements: weight, weight change  Biochemical Data, Medical Tests and Procedures: inflammatory profile, lipid profile, glucose/endocrine profile, gastrointestinal profile, electrolyte and renal panel  Nutrition-Focused Physical Findings: overall appearance     Malnutrition Assessment    Weight Loss (Malnutrition): greater than 10% in 6 months  Energy Intake (Malnutrition): other  (see comments)(GONZALO)  Subcutaneous Fat (Malnutrition): severe depletion  Muscle Mass (Malnutrition): severe depletion   Orbital Region (Subcutaneous Fat Loss): severe depletion  Upper Arm Region (Subcutaneous Fat Loss): severe depletion   [place] Region (Muscle Loss): severe depletion  Clavicle Bone Region (Muscle Loss): severe depletion  Scapular Bone Region (Muscle Loss): severe depletion  Dorsal Hand (Muscle Loss): severe depletion  Anterior Thigh Region (Muscle Loss): (GONZALO)  Posterior Calf Region (Muscle Loss): (GONZALO)     Nutrition Follow-Up    RD Follow-up?: Yes

## 2020-01-24 NOTE — SUBJECTIVE & OBJECTIVE
Interval History/Significant Events:   NAEON  Off all sedation. Neuro status unchanged. Intubated, PEEP 5 and FiO2 21%.        Review of Systems   Unable to perform ROS: Intubated     Objective:     Vital Signs (Most Recent):  Temp: 98.9 °F (37.2 °C) (01/24/20 0300)  Pulse: 95 (01/24/20 0700)  Resp: 15 (01/24/20 0700)  BP: (!) 167/70 (01/24/20 0700)  SpO2: 95 % (01/24/20 0700) Vital Signs (24h Range):  Temp:  [98.9 °F (37.2 °C)-99.2 °F (37.3 °C)] 98.9 °F (37.2 °C)  Pulse:  [] 95  Resp:  [13-32] 15  SpO2:  [88 %-100 %] 95 %  BP: (125-181)/() 167/70   Weight: 57 kg (125 lb 10.6 oz)  Body mass index is 20.28 kg/m².      Intake/Output Summary (Last 24 hours) at 1/24/2020 0740  Last data filed at 1/24/2020 0700  Gross per 24 hour   Intake 630 ml   Output --   Net 630 ml       Physical Exam   Constitutional: No distress.   HENT:   Head: Normocephalic and atraumatic.   Mouth/Throat: No oropharyngeal exudate.   Mild lower lip swelling   Eyes: Pupils are equal, round, and reactive to light.   Neck: No JVD present.   Cardiovascular: Regular rhythm and normal heart sounds. Tachycardia present.   No murmur heard.  Pulmonary/Chest:   Vent Mode: Spont  Oxygen Concentration (%):  (21) 21  Resp Rate Total:  (14 br/min-31 br/min) 18 br/min  Vt Set:  (340 mL) 340 mL  PEEP/CPAP:  (5 cmH20) 5 cmH20  Pressure Support:  (10 cmH20) 10 cmH20  Mean Airway Pressure:  (7.9 wmU86-51 cmH20) 8.4 cmH20   Abdominal: Soft. He exhibits no distension.   Musculoskeletal:   Left below knee amputation   Neurological:   PEERL. Does not track with his eyes. Only withdraws RLE to pain. Cough, gag and corneal reflexes intact.    Skin: Skin is warm and dry. Capillary refill takes less than 2 seconds.   Vitals reviewed.      Vents:  Vent Mode: A/C (01/24/20 0541)  Ventilator Initiated: Yes (01/20/20 1327)  Set Rate: 12 bmp (01/24/20 0541)  Vt Set: 340 mL (01/24/20 0541)  Pressure Support: 0 cmH20 (01/20/20 2000)  PEEP/CPAP: 5 cmH20 (01/24/20  0541)  Oxygen Concentration (%): 21 (01/24/20 0700)  Peak Airway Pressure: 31 cmH2O (01/24/20 0541)  Plateau Pressure: 0 cmH20 (01/24/20 0541)  Total Ve: 7.32 mL (01/24/20 0541)  F/VT Ratio<105 (RSBI): (!) 65.75 (01/24/20 0541)  Lines/Drains/Airways     Drain                 Hemodialysis AV Fistula Right upper arm -- days         Hemodialysis AV Fistula Right upper arm -- days         NG/OG Tube 01/20/20 1932 [place] sump Right nostril 3 days          Airway                 Airway - Non-Surgical 01/20/20 1325 Endotracheal Tube-Hi/Lo 3 days          Peripheral Intravenous Line                 Peripheral IV - Single Lumen 01/20/20 1320 20 G Left Other 3 days         Peripheral IV - Single Lumen 01/21/20 0348 20 G Anterior;Left Forearm 3 days         Peripheral IV - Single Lumen 01/22/20 0012 20 G Anterior;Left;Proximal Forearm 2 days              Significant Labs:    CBC/Anemia Profile:  Recent Labs   Lab 01/23/20  0221 01/23/20  1700 01/24/20  0334   WBC 9.71 8.51 9.69   HGB 13.5* 13.7* 12.8*   HCT 43.4 43.6 40.1    181 209   MCV 91 90 90   RDW 16.2* 16.2* 16.1*        Chemistries:  Recent Labs   Lab 01/22/20  1637 01/23/20  0221 01/24/20  0334    138 138   K 4.4 4.2 4.5   CL 97 97 96   CO2 30* 26 22*   BUN 33* 48* 80*   CREATININE 6.2* 7.5* 9.8*   CALCIUM 10.2 9.6 9.9   ALBUMIN 3.1* 3.0* 2.8*   PROT  --  8.8* 8.6*   BILITOT  --  0.4 0.4   ALKPHOS  --  223* 202*   ALT  --  9* 16   AST  --  18 34   MG 2.2 2.2 2.3   PHOS 4.7* 4.9* 5.7*       All pertinent labs within the past 24 hours have been reviewed.    Significant Imaging:  I have reviewed all pertinent imaging results/findings within the past 24 hours.

## 2020-01-25 NOTE — NURSING
See vital signs and assessments in flowsheets. See below for updates on today's progress.      Pulmonary: Vent, peep 5, fio2: 21%, oxygen saturations >92% throughout shift, copious amounts of secretions.     Cardiovascular: NSR, HR: 80-90's, SBP : 110-130's    Neurological: Afebrile, opens eyes spont, withdraws from pain in only RLE, does not follow commands    Gastrointestinal: NO BM, bowel sounds present x4 quadrants, Tube feeds: Novasource Renal @ 30cc/hr (Goal)     Genitourinary: Anuric , HD patient     Integumentary/Other: Left BKA,  Bilateral Arms edematous 2+     Infusions:  KVO    Last chlorhexidine bath given: 1/25/2020 (Day shift)     POC: Continue to monitor patients hemodynamically and continue plan of care. Plan reviewed with patient. No further questions at this time.

## 2020-01-25 NOTE — ASSESSMENT & PLAN NOTE
Patient on coreg at nursing home; has been compliant as given by nursing home. Patient with hypertensive emergency on admit and started Cardene drip to titrate to BP of 160-180. Off Cardene drip since 01/20. Goal SBP < 160 per Neurology. Pt started on home Carvedilol on 01/24.     -- Carvedilol 3.125 mg BID

## 2020-01-25 NOTE — ASSESSMENT & PLAN NOTE
Patient normally MWF dialysis. Last full dialysis session prior to admission was Friday 01/17/20.   Nephrology following for HD management.   11/25: unable to take fluid off during HD over night due to hypotension.

## 2020-01-25 NOTE — PLAN OF CARE
No significant events overnight. HD completed. See dialysis note. POC discussed with pt with no evidence of learning. WCTM.

## 2020-01-25 NOTE — ASSESSMENT & PLAN NOTE
GERD with esophagitis    Patient with reports of coffee ground emesis prior to transport to ED. In ED, dark brown contents suctioned from stomach. Hemoglobin remains stable at 15 and patient is hypertensive. Patient is well known to GI. His most recent scope was in November that just showed esophagitis similar to his previous EGD. GI consulted and appreciate recommendations. Currently on 40 mg BID.      - BID CBC, transfuse as needed  - Continue Pantoprazole 40 mg BID

## 2020-01-25 NOTE — CARE UPDATE
Spoke with Ina Joseph, will hold Zosyn, levetiracetam, and vancomycin dose until after HD session.

## 2020-01-25 NOTE — PROGRESS NOTES
Bedside HD initiated via RUE fistula with 2- 15 gauge needles. UF goal set to 1L. Will continue to monitor for 3 hrs.

## 2020-01-25 NOTE — ASSESSMENT & PLAN NOTE
[place] [facility] [place], EF>55%, bi-atrial enlargement

## 2020-01-25 NOTE — PROGRESS NOTES
2.5hr dialysis treatment completed with net UF of 200ml. Treatment ended 24mins early due to low blood pressure. SBP 80'S; MAP <60.  Unable to pull fluids. UF turned off. Machine temp set to 35.5. 100ml saline bolus given but still with episodes of hypotension. Blood rinsed back. Pulled out needles. Pressure applied and hemostasis achieved. Gauzed and taped. Positive thrill and bruit. Patient's blood pressure went up to 141/61.  Report given to primary RN.

## 2020-01-25 NOTE — SUBJECTIVE & OBJECTIVE
Interval History/Significant Events:   Pt started on home Carvedilol yesterday with PRN hydralazine to maintain SBP < 160, per neurology recs. Pt became hypotension during dialysis overnight, so unable to remove fluid off.    Off all sedation. No neurological improvement. Intubated, PEEP 5 and FiO2 21%.        Review of Systems   Unable to perform ROS: Intubated     Objective:     Vital Signs (Most Recent):  Temp: 98.2 °F (36.8 °C) (01/25/20 0300)  Pulse: 86 (01/25/20 0600)  Resp: 17 (01/25/20 0600)  BP: (!) 106/45 (01/25/20 0600)  SpO2: (!) 93 % (01/25/20 0600) Vital Signs (24h Range):  Temp:  [97 °F (36.1 °C)-98.2 °F (36.8 °C)] 98.2 °F (36.8 °C)  Pulse:  [84-97] 86  Resp:  [11-30] 17  SpO2:  [84 %-100 %] 93 %  BP: ()/(34-99) 106/45   Weight: 57 kg (125 lb 10.6 oz)  Body mass index is 20.28 kg/m².      Intake/Output Summary (Last 24 hours) at 1/25/2020 0803  Last data filed at 1/25/2020 0600  Gross per 24 hour   Intake 1830 ml   Output --   Net 1830 ml       Physical Exam   Constitutional: No distress.   HENT:   Head: Normocephalic and atraumatic.   Mouth/Throat: No oropharyngeal exudate.   Lower lip swelling   Eyes: Pupils are equal, round, and reactive to light.   Neck: No JVD present.   Cardiovascular: Normal rate, regular rhythm and normal heart sounds.   No murmur heard.  Pulmonary/Chest:   Vent Mode: Spont  Oxygen Concentration (%):  (21) 21  Resp Rate Total:  (14 br/min-31 br/min) 18 br/min  Vt Set:  (340 mL) 340 mL  PEEP/CPAP:  (5 cmH20) 5 cmH20  Pressure Support:  (10 cmH20) 10 cmH20  Mean Airway Pressure:  (7.9 [id number] cmH20) 8.4 cmH20   Abdominal: Soft. He exhibits no distension.   Musculoskeletal:   Left below knee amputation   Neurological:   PEERL. Does not track with his eyes. Does not withdraw to pain. Cough, gag and corneal reflexes intact.    Skin: Skin is warm and dry. Capillary refill takes less than 2 seconds.   Vitals reviewed.      Vents:  Vent Mode: Spont (01/25/20 0509)  Ventilator  Initiated: Yes (01/20/20 1327)  Set Rate: 12 bmp (01/24/20 1403)  Vt Set: 340 mL (01/24/20 1403)  Pressure Support: 10 cmH20 (01/25/20 0509)  PEEP/CPAP: 5 cmH20 (01/25/20 0509)  Oxygen Concentration (%): 21 (01/25/20 0600)  Peak Airway Pressure: 16 cmH2O (01/25/20 0509)  Plateau Pressure: 11 cmH20 (01/25/20 0509)  Total Ve: 7.42 mL (01/25/20 0509)  F/VT Ratio<105 (RSBI): (!) 25.51 (01/25/20 0509)  Lines/Drains/Airways     Drain                 Hemodialysis AV Fistula Right upper arm -- days         Hemodialysis AV Fistula Right upper arm -- days         NG/OG Tube 01/20/20 1932 [place] sump Right nostril 4 days          Airway                 Airway - Non-Surgical 01/20/20 1325 Endotracheal Tube-Hi/Lo 4 days          Peripheral Intravenous Line                 Peripheral IV - Single Lumen 01/21/20 0348 20 G Anterior;Left Forearm 4 days         Peripheral IV - Single Lumen 01/22/20 0012 20 G Anterior;Left;Proximal Forearm 3 days              Significant Labs:    CBC/Anemia Profile:  Recent Labs   Lab 01/24/20  0334 01/24/20  1653 01/25/20  0420   WBC 9.69 8.66 6.11   HGB 12.8* 12.7* 11.5*   HCT 40.1 38.9* 37.2*    209 200   MCV 90 88 91   RDW 16.1* 15.9* 15.9*        Chemistries:  Recent Labs   Lab 01/24/20  0334 01/25/20  0420    139   K 4.5 4.4   CL 96 102   CO2 22* 23   BUN 80* 48*   CREATININE 9.8* 6.8*   CALCIUM 9.9 9.4   ALBUMIN 2.8* 2.4*   PROT 8.6* 7.9   BILITOT 0.4 0.5   ALKPHOS 202* 179*   ALT 16 29   AST 34 45*   MG 2.3 2.3   PHOS 5.7* 4.6*       All pertinent labs within the past 24 hours have been reviewed.    Significant Imaging:  I have reviewed all pertinent imaging results/findings within the past 24 hours.

## 2020-01-25 NOTE — PROGRESS NOTES
[facility]  Critical Care Medicine  Progress Note    Patient Name: Vaughn Retana  MRN: [id number]  Admission Date: 1/20/2020  Hospital Length of Stay: 5 days  Code Status: Full Code  Attending Provider: Jennifer Dahl DO  Primary Care Provider: Cori Randall MD   Principal Problem: Encephalopathy    Subjective:     HPI:  Patient is a 54 year old male with extensive medical history including ESRD on dialysis MWF (has not received it today), L below knee amputation 2/2 osteomyelitis, dCHF (last echo 8/2/19 [facility] [place] GERD, h/o multiple ICH w/o residual deficits, and multiple instances of GI bleed (last EGD 11/12/19, esophagitis) who presents to ED [facility] nursing home due to altered mental status. From NH report, nursing home staff went to wake the patient for his morning dialysis. He was confused, lethargic, had a moderate amount of brown colored emesis in his trash can. Patient last got dialysis on Friday. Patient is normally able to ambulate on his own and is alert and oriented; nursing staff reports that he appeared normal yesterday.       At the time of ICU consult, initial work up showed largely unremarkable cbc with no leukocytosis. CMP significant for a bicarb of 36; patient is ESRD with creatinine of 10.8. Patient still makes some urine, and UA was without evidence for UTI. Lactic acid acid was mildly elevated at 2.4 and troponin mildly elevated at .348->.339. INR is at 1.2. Alcohol and drug screen negative. Patient was given versed prior to undergoing CT scan; which showed no acute intraparenchymal hemorrhage or major vascular distribution, senescent changes, and remote lacunar type basal ganglia infarct. Shortly after CT, patient had a witnessed tonic clonic seizure. He received 2 mg of ativan and 1500 of keppra. Patient became non-responsive afterwards and was severely hypertensive with systolic bp in the 200's. Patient was intubated for airway protection and started on  propofol and Cardene drip for hypertension. Family updated over the phone and at bedside.                Hospital/ICU Course:  Patient admitted to critical care medicine on 1/20 with concerns of new onset seizures and s/p intubation for airway protection. Patient was started on vanc, rocephin, ampicillin, and acyclovir to cover for meningitis. Patient had spot EEG while on propofol in ED which did not show seizure activity. MRI done showed chronic changes but no acute abnormalities. Patient was taken off cardene drip soon after he arrived in ICU. Lumbar puncture was done after MRI. CSF labs were not convincing for bacterial or viral meningitis; although cultures are pending. Continuous EEG was done after propofol was stopped which showed epileptiform discharges. Per epilepsy, patient was given another dose of Keppra, will follow up after checking levels as patient is ESRD. Nephrology consulted and started HD. Pt became febrile the night of 01/22; blood cultures repeated and restarted on Vanc and Zosyn. HSV PCR neg so Acyclovir discontinued. Neurology would like SBP < 160; initiated home Carvedilol.           Interval History/Significant Events:   Pt started on home Carvedilol yesterday with PRN hydralazine to maintain SBP < 160, per neurology recs. Pt became hypotension during dialysis overnight, so unable to remove fluid off.    Off all sedation. No neurological improvement. Intubated, PEEP 5 and FiO2 21%.        Review of Systems   Unable to perform ROS: Intubated     Objective:     Vital Signs (Most Recent):  Temp: 98.2 °F (36.8 °C) (01/25/20 0300)  Pulse: 86 (01/25/20 0600)  Resp: 17 (01/25/20 0600)  BP: (!) 106/45 (01/25/20 0600)  SpO2: (!) 93 % (01/25/20 0600) Vital Signs (24h Range):  Temp:  [97 °F (36.1 °C)-98.2 °F (36.8 °C)] 98.2 °F (36.8 °C)  Pulse:  [84-97] 86  Resp:  [11-30] 17  SpO2:  [84 %-100 %] 93 %  BP: ()/(34-99) 106/45   Weight: 57 kg (125 lb 10.6 oz)  Body mass index is 20.28  kg/m².      Intake/Output Summary (Last 24 hours) at 1/25/2020 0803  Last data filed at 1/25/2020 0600  Gross per 24 hour   Intake 1830 ml   Output --   Net 1830 ml       Physical Exam   Constitutional: No distress.   HENT:   Head: Normocephalic and atraumatic.   Mouth/Throat: No oropharyngeal exudate.   Lower lip swelling   Eyes: Pupils are equal, round, and reactive to light.   Neck: No JVD present.   Cardiovascular: Normal rate, regular rhythm and normal heart sounds.   No murmur heard.  Pulmonary/Chest:   Vent Mode: Spont  Oxygen Concentration (%):  (21) 21  Resp Rate Total:  (14 br/min-31 br/min) 18 br/min  Vt Set:  (340 mL) 340 mL  PEEP/CPAP:  (5 cmH20) 5 cmH20  Pressure Support:  (10 cmH20) 10 cmH20  Mean Airway Pressure:  (7.9 waL86-34 cmH20) 8.4 cmH20   Abdominal: Soft. He exhibits no distension.   Musculoskeletal:   Left below knee amputation   Neurological:   PEERL. Does not track with his eyes. Does not withdraw to pain. Cough, gag and corneal reflexes intact.    Skin: Skin is warm and dry. Capillary refill takes less than 2 seconds.   Vitals reviewed.      Vents:  Vent Mode: Spont (01/25/20 0509)  Ventilator Initiated: Yes (01/20/20 1327)  Set Rate: 12 bmp (01/24/20 1403)  Vt Set: 340 mL (01/24/20 1403)  Pressure Support: 10 cmH20 (01/25/20 0509)  PEEP/CPAP: 5 cmH20 (01/25/20 0509)  Oxygen Concentration (%): 21 (01/25/20 0600)  Peak Airway Pressure: 16 cmH2O (01/25/20 0509)  Plateau Pressure: 11 cmH20 (01/25/20 0509)  Total Ve: 7.42 mL (01/25/20 0509)  F/VT Ratio<105 (RSBI): (!) 25.51 (01/25/20 0509)  Lines/Drains/Airways     Drain                 Hemodialysis AV Fistula Right upper arm -- days         Hemodialysis AV Fistula Right upper arm -- days         NG/OG Tube 01/20/20 1932 [place] sump Right nostril 4 days          Airway                 Airway - Non-Surgical 01/20/20 1325 Endotracheal Tube-Hi/Lo 4 days          Peripheral Intravenous Line                 Peripheral IV - Single Lumen 01/21/20  0348 20 G Anterior;Left Forearm 4 days         Peripheral IV - Single Lumen 01/22/20 0012 20 G Anterior;Left;Proximal Forearm 3 days              Significant Labs:    CBC/Anemia Profile:  Recent Labs   Lab 01/24/20  0334 01/24/20  1653 01/25/20  0420   WBC 9.69 8.66 6.11   HGB 12.8* 12.7* 11.5*   HCT 40.1 38.9* 37.2*    209 200   MCV 90 88 91   RDW 16.1* 15.9* 15.9*        Chemistries:  Recent Labs   Lab 01/24/20  0334 01/25/20  0420    139   K 4.5 4.4   CL 96 102   CO2 22* 23   BUN 80* 48*   CREATININE 9.8* 6.8*   CALCIUM 9.9 9.4   ALBUMIN 2.8* 2.4*   PROT 8.6* 7.9   BILITOT 0.4 0.5   ALKPHOS 202* 179*   ALT 16 29   AST 34 45*   MG 2.3 2.3   PHOS 5.7* 4.6*       All pertinent labs within the past 24 hours have been reviewed.    Significant Imaging:  I have reviewed all pertinent imaging results/findings within the past 24 hours.      ABG  Recent Labs   Lab 01/21/20  2334   PH 7.361   PO2 40   PCO2 53.2*   HCO3 30.1*   BE 5     Assessment/Plan:     Neuro  Seizure  Altered mental status    Patient initially presented to ED prior to getting dialysis due to AMS and brown emesis. Had witnessed tonic clonic seizure requiring ativan shortly after getting a CT head. He was loaded with 1500 mg Keppra, intubated for airway protection, and started on propofol for sedation. Upon physical exam, patient remained unresponsive to verbal or tactile stimuli and had upward left sided gaze with sluggish pupils. Concern for status epilepticus. Differential diagnosis includes polysubstance, sepsis, intracranial abnormalities, and PRES.    - Spot EEG without evidence of current seizure. However patient already received keppra, ativan, and sedated on propofol. 24 hour EEG with L sided structural lesion and underlying epileptiform potential. Initiated 1500 mg keppra on 01/22, per Neurology recs. Continue Keppra 500 mg BID.   - Substance induced: UDS negative, alcohol level wnl  - Sepsis: Initial lactate 2.4, likely due to  seizure event; repeat was 1.9. Patient received 500 ml saline in ED. Initial blood cultures, sputum culture+gram stain neg  Lumbar puncture done, CSF not convincing for meningitis, so ampicillin, vancomycin, rocephin d/c'd on 01/22. However, pt was febrile on 11/23 to 101.3. Blood cultures repeated and restarted on Vanc and Zosyn. Repeat cultures NGTD, continue Vanc and Zosyn. HSV PCR neg; Acyclovir discontinued.   - Intracranial: patient with history of ICH with no functional deficits. Possibly multiple foci for seizure overtime. CT without acute changes, MRI brain with chronic changes and hypertensive angiopathy; no acute changes.  - PRES: Patient off cardene drip. MRI reviewed. Will follow blood pressure as patient is normally hypotensive.        Cardiac/Vascular  Renovascular hypertension  Patient on coreg at nursing home; has been compliant as given by nursing home. Patient with hypertensive emergency on admit and started Cardene drip to titrate to BP of 160-180. Off Cardene drip since 01/20. Goal SBP < 160 per Neurology. Pt started on home Carvedilol on 01/24.     -- Carvedilol 3.125 mg BID    Chronic diastolic heart failure  [place] [facility] [place], EF>55%, bi-atrial enlargement      Renal/  ESRD on hemodialysis  Patient normally MWF dialysis. Last full dialysis session prior to admission was Friday 01/17/20.   Nephrology following for HD management.   11/25: unable to take fluid off during HD over night due to hypotension.     Endocrine  Severe malnutrition  Nutrition consulted  Novasource @ 30 mL/hr to provide 1440 kcals, 65 g of protein, 516 mL fluid.     GI  Gastrointestinal hemorrhage with hematemesis  GERD with esophagitis    Patient with reports of coffee ground emesis prior to transport to ED. In ED, dark brown contents suctioned from stomach. Hemoglobin remains stable at 15 and patient is hypertensive. Patient is well known to GI. His most recent scope was in November that just showed  esophagitis similar to his previous EGD. GI consulted and appreciate recommendations. Currently on 40 mg BID.      - BID CBC, transfuse as needed  - Continue Pantoprazole 40 mg BID       Critical Care Daily Checklist:    A: Awake: RASS Goal/Actual Goal: RASS Goal: 0-->alert and calm  Actual: [place] Agitation Sedation Scale (RASS): Drowsy   B: Spontaneous Breathing Trial Performed?     C: SAT & SBT Coordinated?  n/a                      D: Delirium: CAM-ICU Overall CAM-ICU: Positive   E: Early Mobility Performed? No   F: Feeding Goal: Goals: Meet % EEN, EPN  Status: Nutrition Goal Status: goal met   Current Diet Order   No orders of the defined types were placed in this encounter.      AS: Analgesia/Sedation n/a   T: Thromboembolic Prophylaxis Heparin SQ   H: HOB > 300 Yes   U: Stress Ulcer Prophylaxis (if needed) Protonix    G: Glucose Control n/a   B: Bowel Function Stool Occurrence: 1   I: Indwelling Catheter (Lines & Arcos) Necessity PIV   D: De-escalation of Antimicrobials/Pharmacotherapies n/a    Plan for the day/ETD     Code Status:  Family/Goals of Care: Full Code         Critical secondary to Patient has a condition that poses threat to life and bodily function: Severe Respiratory Distress and Renal Failure      Critical care was time spent personally by me on the following activities: development of treatment plan with patient or surrogate and bedside caregivers, discussions with consultants, evaluation of patient's response to treatment, examination of patient, ordering and performing treatments and interventions, ordering and review of laboratory studies, ordering and review of radiographic studies, pulse oximetry, re-evaluation of patient's condition. This critical care time did not overlap with that of any other provider or involve time for any procedures.     Danielle Arnold MD  Critical Care Medicine  [facility]

## 2020-01-26 NOTE — ASSESSMENT & PLAN NOTE
Altered mental status    Patient initially presented to ED prior to getting dialysis due to AMS and brown emesis. Had witnessed tonic clonic seizure requiring ativan shortly after getting a CT head. He was loaded with 1500 mg Keppra, intubated for airway protection, and started on propofol for sedation. Upon physical exam, patient remained unresponsive to verbal or tactile stimuli and had upward left sided gaze with sluggish pupils. Concern for status epilepticus. Differential diagnosis includes polysubstance, sepsis, intracranial abnormalities, and PRES.    - Spot EEG without evidence of current seizure. However patient already received keppra, ativan, and sedated on propofol. 24 hour EEG with L sided structural lesion and underlying epileptiform potential. Initiated 1500 mg keppra on 01/22, per Neurology recs. Continue Keppra 500 mg BID.   - Substance induced: UDS negative, alcohol level wnl  - Sepsis: Initial lactate 2.4, likely due to seizure event; repeat was 1.9. Patient received 500 ml saline in ED. Initial blood cultures, sputum culture+gram stain neg  Lumbar puncture done, CSF not convincing for meningitis, so ampicillin, vancomycin, rocephin d/c'd on 01/22. However, pt was febrile on 11/23 to 101.3. Blood cultures repeated and restarted on Vanc and Zosyn. Repeat cultures NGTD. HSV PCR neg; Acyclovir discontinued. Prt has remained afebrile for > 72 hours. Will discontinue Vanc and Zosyn.   - Intracranial: patient with history of ICH with no functional deficits. Possibly multiple foci for seizure overtime. CT without acute changes, MRI brain with chronic changes and hypertensive angiopathy; no acute changes.  - PRES: Patient off cardene drip. MRI reviewed. Will follow blood pressure as patient is normally hypotensive.   - Neurology would like to repeat EEG to ensure that pt is not having any seizures.

## 2020-01-26 NOTE — ASSESSMENT & PLAN NOTE
Patient normally MWF dialysis. Last full dialysis session prior to admission was Friday 01/17/20.   Nephrology following for HD management.

## 2020-01-26 NOTE — PROGRESS NOTES
Vancomycin consult discontinued by provider.  Pharmacy will sign off, please re-consult as needed.    Nikki Chaudhary, PharmD, [facility]  Critical Care Clinical Pharmacist  Spectralink: [id number]

## 2020-01-26 NOTE — PLAN OF CARE
CMICU DAILY GOALS       A: Awake    RASS: Goal - RASS Goal: 0-->alert and calm  Actual - RASS ([place] Agitation-Sedation Scale): -1-->drowsy   Restraint necessity: Clinical Justification: Treatment Interference  B: Breath   SBT: Not attempted   C: Coordinate A & B, analgesics/sedatives   Pain: managed    SAT: Not attempted  D: Delirium   CAM-ICU: Overall CAM-ICU: Negative  E: Early Mobility   MOVE Screen: Fail   Activity: Activity Management: bedrest maintained per order  FAS: Feeding/Nutrition   Diet order: Diet/Nutrition Received: NPO, tube feeding,   Fluid restriction:    T: Thrombus   DVT prophylaxis: VTE Required Core Measure: Pharmacological prophylaxis initiated/maintained  H: HOB Elevation   Head of Bed (HOB): HOB at 30-45 degrees  U: Ulcer Prophylaxis   GI: yes  G: Glucose control   managed Glycemic Management: blood glucose monitoring  S: Skin   Bundle compliance: yes   Bathing/Skin Care: (S) bath, chlorhexidine, bath, complete, linen changed, back care, foot care Date: 1/25/20 AM shift   B: Bowel Function   no issues   I: Indwelling Catheters   Arcos necessity: [REMOVED]      Urethral Catheter 01/20/20 1950 Straight-tip 16 Fr.-Reason for Continuing Urinary Catheterization: Critically ill in ICU requiring intensive monitoring   CVC necessity: No   IPAD offered: Not appropriate  D: De-escalation Antibx   Yes  Plan for the day   Goals of care talk with family scheduled for Monday.   Family/Goals of care/Code Status   Code Status: Full Code     No acute events throughout day, VS and assessment per flow sheet, patient progressing towards goals as tolerated, plan of care reviewed with Vaughn Retana and family, all concerns addressed, will continue to monitor.

## 2020-01-26 NOTE — SUBJECTIVE & OBJECTIVE
Interval History/Significant Events:   NAONE.    Off all sedation. No neurological improvement. Intubated, PEEP 5 and FiO2 21%. No neurologic improvement.      Review of Systems   Unable to perform ROS: Intubated     Objective:     Vital Signs (Most Recent):  Temp: 98.5 °F (36.9 °C) (01/26/20 1105)  Pulse: 80 (01/26/20 1129)  Resp: 15 (01/26/20 1129)  BP: 126/60 (01/26/20 1105)  SpO2: 99 % (01/26/20 1129) Vital Signs (24h Range):  Temp:  [98.1 °F (36.7 °C)-98.9 °F (37.2 °C)] 98.5 °F (36.9 °C)  Pulse:  [79-91] 80  Resp:  [8-19] 15  SpO2:  [95 %-100 %] 99 %  BP: (105-152)/(46-71) 126/60   Weight: 57 kg (125 lb 10.6 oz)  Body mass index is 20.28 kg/m².      Intake/Output Summary (Last 24 hours) at 1/26/2020 1139  Last data filed at 1/26/2020 1105  Gross per 24 hour   Intake 1205 ml   Output --   Net 1205 ml       Physical Exam   Constitutional: No distress.   HENT:   Head: Normocephalic and atraumatic.   Mouth/Throat: No oropharyngeal exudate.   Lower lip swelling improving.    Eyes: Pupils are equal, round, and reactive to light.   Neck: No JVD present.   Cardiovascular: Normal rate, regular rhythm and normal heart sounds.   No murmur heard.  Pulmonary/Chest:   Vent Mode: Spont  Oxygen Concentration (%):  (21) 21  Resp Rate Total:  (14 br/min-31 br/min) 18 br/min  Vt Set:  (340 mL) 340 mL  PEEP/CPAP:  (5 cmH20) 5 cmH20  Pressure Support:  (10 cmH20) 10 cmH20  Mean Airway Pressure:  (7.9 khI82-74 cmH20) 8.4 cmH20   Abdominal: Soft. He exhibits no distension. There is no tenderness.   Musculoskeletal:   Left below knee amputation   Neurological:   PEERL. Does not track with his eyes. Minimal movement in L stump and LLE to pain. Cough, gag and corneal reflexes intact.    Skin: Skin is warm and dry. Capillary refill takes less than 2 seconds.   Vitals reviewed.      Vents:  Vent Mode: A/C (01/26/20 1129)  Ventilator Initiated: Yes (01/20/20 1327)  Set Rate: 12 bmp (01/26/20 1129)  Vt Set: 340 mL (01/24/20 1403)  Pressure  Support: 10 cmH20 (01/26/20 0159)  PEEP/CPAP: 5 cmH20 (01/26/20 1129)  Oxygen Concentration (%): 21 (01/26/20 1129)  Peak Airway Pressure: 16 cmH2O (01/26/20 1129)  Plateau Pressure: 11 cmH20 (01/26/20 1129)  Total Ve: 6 mL (01/26/20 1129)  F/VT Ratio<105 (RSBI): (!) 32.4 (01/26/20 1129)  Lines/Drains/Airways     Drain                 Hemodialysis AV Fistula Right upper arm -- days         Hemodialysis AV Fistula Right upper arm -- days         NG/OG Tube 01/20/20 1932 [place] sump Right nostril 5 days          Airway                 Airway - Non-Surgical 01/20/20 1325 Endotracheal Tube-Hi/Lo 5 days          Peripheral Intravenous Line                 Peripheral IV - Single Lumen 01/21/20 0348 20 G Anterior;Left Forearm 5 days         Peripheral IV - Single Lumen 01/22/20 0012 20 G Anterior;Left;Proximal Forearm 4 days              Significant Labs:    CBC/Anemia Profile:  Recent Labs   Lab 01/25/20  0420 01/25/20  1626 01/26/20  0330   WBC 6.11 7.23 7.16   HGB 11.5* 12.1* 11.2*   HCT 37.2* 37.5* 36.3*    210 235   MCV 91 89 91   RDW 15.9* 15.9* 16.0*        Chemistries:  Recent Labs   Lab 01/25/20  0420 01/26/20  0330    140   K 4.4 4.6    101   CO2 23 22*   BUN 48* 70*   CREATININE 6.8* 8.8*   CALCIUM 9.4 9.7   ALBUMIN 2.4* 2.5*   PROT 7.9 8.3   BILITOT 0.5 0.5   ALKPHOS 179* 181*   ALT 29 38   AST 45* 53*   MG 2.3 2.6   PHOS 4.6* 4.8*       All pertinent labs within the past 24 hours have been reviewed.    Significant Imaging:  I have reviewed all pertinent imaging results/findings within the past 24 hours.

## 2020-01-26 NOTE — NURSING
Patient started on EEG. No acute events throughout shift, VS and assessment per flow sheet, plan of care reviewed with Mr. Vaughn Retana, will continue to monitor.

## 2020-01-26 NOTE — ASSESSMENT & PLAN NOTE
Mr. 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  He has been persistently altered with minimal improvement, but has occasional fluctuations in exam.    Recommendations  -Connect to overnight EEG  -Continue keppra 500 mg BID  -Check ammonia, continue thiamine repletion pending lab result

## 2020-01-26 NOTE — PROGRESS NOTES
Pharmacokinetic Assessment Follow Up: IV Vancomycin    Vancomycin Assessment/Plan  · Vancomycin random with AM labs resulted at 25.2 mcg/mL.   · No vancomycin required today.   · Further vancomycin dosing will be dependent upon HD plans. Likely will not need dose after next dialysis session as expected post-HD level is approximately 17.5 mcg/mL.   · Next level to be drawn on Tuesday 1/28 with AM labs.     Drug levels (last 3 results):  Recent Labs   Lab Result Units 01/24/20  0334 01/26/20  0330   Vancomycin, Random ug/mL 19.1 25.2       Pharmacy will continue to follow and monitor vancomycin. Please contact pharmacy for questions regarding this assessment.    Thank you for the consult,   Nikki Chaudhary, PharmD, [facility]  Critical Care Clinical Pharmacist  Spectralink: [id number]  _________________________________________________________________________________________________________________       Patient brief summary:  Vaughn Retana is a 54 y.o. male initiated on antimicrobial therapy with IV Vancomycin for treatment of lower respiratory infection    The patient's current regimen is pulse dosing in regard to HD schedule.    Drug Allergies:   Review of patient's allergies indicates:   Allergen Reactions    Fosrenol [lanthanum] Nausea And Vomiting     Nausea and vomiting       Actual Body Weight:   57 kg    Renal Function:   Estimated Creatinine Clearance: 7.6 mL/min (A) (based on SCr of 8.8 mg/dL (H)).,     Dialysis Method (if applicable):  intermittent HD

## 2020-01-26 NOTE — ASSESSMENT & PLAN NOTE
Patient on coreg at nursing home; has been compliant as given by nursing home. Patient with hypertensive emergency on admit and started Cardene drip to titrate to BP of 160-180. Off Cardene drip since 01/20. Goal SBP < 160 per Neurology. Pt started on home Carvedilol on 01/24; BP well controlled.     -- Carvedilol 3.125 mg BID

## 2020-01-26 NOTE — SUBJECTIVE & OBJECTIVE
Subjective:     Interval History:   No acute events overnight, remains intubated and encephalopathic.      Current Facility-Administered Medications   Medication Dose Route Frequency Provider Last Rate Last Dose    0.9%  NaCl infusion   Intravenous Once Glenis Benavidez DNP, FNP-C        acetaminophen tablet 650 mg  650 mg Per NG tube Q6H PRN Yomaira BO Arsenio, NP   650 mg at 01/22/20 2010    carvedilol tablet 3.125 mg  3.125 mg Per OG tube BID  PRATIK Dawson-C   3.125 mg at 01/26/20 1642    chlorhexidine 0.12 % solution 15 mL  15 mL Mouth/Throat BID Bharat Ramirez, MARYJO   15 mL at 01/26/20 0945    heparin (porcine) injection 5,000 Units  5,000 Units Subcutaneous Q8H Danielle Arnold MD   5,000 Units at 01/26/20 1453    levETIRAcetam in NaCl (iso-os) IVPB 500 mg  500 mg Intravenous Q12H Dakota Alfredo  mL/hr at 01/26/20 0945 500 mg at 01/26/20 0945    multivitamin liquid no.118 per dose 10 mL  10 mL Oral Daily Danielle Arnold MD   10 mL at 01/26/20 1449    pantoprazole suspension 40 mg  40 mg Per NG tube BID Hero Burton MD   40 mg at 01/26/20 0945    sevelamer carbonate pwpk 1.6 g  1.6 g Per OG tube TID Bryan Merrill MD   1.6 g at 01/26/20 1449    sodium chloride 0.9% flush 10 mL  10 mL Intravenous PRN Colt Gonzales MD        thiamine (B-1) 100 mg in dextrose 5 % 50 mL IVPB  100 mg Intravenous Daily Danielle Arnold MD 12.5 mL/hr at 01/26/20 1233 100 mg at 01/26/20 1233       Review of Systems   Constitutional: Negative for fever.   Respiratory:        +intubated   Neurological: Positive for weakness.     Objective:     Vital Signs (Most Recent):  Temp: 98.6 °F (37 °C) (01/26/20 1505)  Pulse: 79 (01/26/20 1700)  Resp: 16 (01/26/20 1700)  BP: (!) 146/65 (01/26/20 1700)  SpO2: 99 % (01/26/20 1700) Vital Signs (24h Range):  Temp:  [98.1 °F (36.7 °C)-98.9 °F (37.2 °C)] 98.6 °F (37 °C)  Pulse:  [77-91] 79  Resp:  [8-19] 16  SpO2:  [95 %-100 %] 99 %  BP:  (115-153)/(55-71) 146/65     Weight: 57 kg (125 lb 10.6 oz)  Body mass index is 20.28 kg/m².    Physical Exam   Constitutional: He appears well-developed.   Cardiovascular: Normal rate and regular rhythm.   Pulmonary/Chest:   +intubated, coarse mechanical breath sounds b/l lung fields   Abdominal: Soft.   Neurological:   Reflex Scores:       Bicep reflexes are 1+ on the right side and 1+ on the left side.       Brachioradialis reflexes are 1+ on the right side and 1+ on the left side.      NEUROLOGICAL EXAMINATION:     MENTAL STATUS        Not on any sedation  Intermittently awakens to sound or light touch on chest before immediately returning to sleep.  Does not follow any commands.     CRANIAL NERVES        PERRL  Oculocephalic intact   Blinks to threat R>L (minimal on L)  Cough intact, gag weak     MOTOR EXAM   Muscle bulk: normal  Overall muscle tone: decreased       Limited by AMS  LLE amputated      REFLEXES     Reflexes   Right brachioradialis: 1+  Left brachioradialis: 1+  Right biceps: 1+  Left biceps: 1+  Right plantar: normal    SENSORY EXAM        Exam limited by AMS     GAIT AND COORDINATION        Exam limited by AMS       Significant Labs:   Recent Results (from the past 12 hour(s))   CBC auto differential    Collection Time: 01/26/20  4:57 PM   Result Value Ref Range    WBC 5.31 3.90 - 12.70 K/uL    RBC 3.57 (L) 4.60 - 6.20 M/uL    Hemoglobin 10.0 (L) 14.0 - 18.0 g/dL    Hematocrit 33.2 (L) 40.0 - 54.0 %    Mean Corpuscular Volume 93 82 - 98 fL    Mean Corpuscular Hemoglobin 28.0 27.0 - 31.0 pg    Mean Corpuscular Hemoglobin Conc 30.1 (L) 32.0 - 36.0 g/dL    RDW 16.2 (H) 11.5 - 14.5 %    Platelets 210 150 - 350 K/uL    MPV 10.8 9.2 - 12.9 fL    Immature Granulocytes 0.4 0.0 - 0.5 %    Gran # (ANC) 3.0 1.8 - 7.7 K/uL    Immature Grans (Abs) 0.02 0.00 - 0.04 K/uL    Lymph # 1.0 1.0 - 4.8 K/uL    Mono # 0.8 0.3 - 1.0 K/uL    Eos # 0.4 0.0 - 0.5 K/uL    Baso # 0.01 0.00 - 0.20 K/uL    nRBC 0 0 /100 WBC     Gran% 56.3 38.0 - 73.0 %    Lymph% 19.2 18.0 - 48.0 %    Mono% 15.6 (H) 4.0 - 15.0 %    Eosinophil% 8.3 (H) 0.0 - 8.0 %    Basophil% 0.2 0.0 - 1.9 %    Differential Method Automated        Microbiology Results (last 7 days)     Procedure Component Value Units Date/Time    Blood culture [[id number]] Collected:  01/23/20 0643    Order Status:  Completed Specimen:  Blood from Peripheral, Wrist, Left Updated:  01/26/20 0812     Blood Culture, Routine No Growth to date      No Growth to date      No Growth to date      No Growth to date    Blood culture [[id number]] Collected:  01/23/20 0612    Order Status:  Completed Specimen:  Blood from Peripheral, Jugular, External Right Updated:  01/26/20 0812     Blood Culture, Routine No Growth to date      No Growth to date      No Growth to date      No Growth to date    CSF culture [[id number]] Collected:  01/21/20 0022    Order Status:  Completed Specimen:  CSF (Spinal Fluid) from CSF Tap, Tube 3 Updated:  01/26/20 0735     CSF CULTURE No Growth     Gram Stain Result No WBC's, epithelial cells or organisms seen    Blood Culture #1 **CANNOT BE ORDERED STAT** [[id number]] Collected:  01/20/20 0832    Order Status:  Completed Specimen:  Blood from Peripheral, Antecubital, Left Updated:  01/25/20 1412     Blood Culture, Routine No growth after 5 days.    Blood Culture #2 **CANNOT BE ORDERED STAT** [[id number]] Collected:  01/20/20 0840    Order Status:  Completed Specimen:  Blood from Peripheral, Wrist, Left Updated:  01/25/20 1412     Blood Culture, Routine No growth after 5 days.    Culture, Respiratory with Gram Stain [[id number]] Collected:  01/23/20 1015    Order Status:  Completed Specimen:  Respiratory from Sputum Updated:  01/25/20 0823     Respiratory Culture Normal respiratory joy     Gram Stain (Respiratory) <10 epithelial cells per low power field.     Gram Stain (Respiratory) No WBC's     Gram Stain (Respiratory) No organisms seen    Fungus culture [[id number]]  Collected:  01/21/20 0026    Order Status:  Completed Specimen:  CSF (Spinal Fluid) from CSF Tap, Tube 3 Updated:  01/22/20 1012     Fungus (Mycology) Culture Culture in progress    AFB Culture & Smear [[id number]] Collected:  01/21/20 0026    Order Status:  Completed Specimen:  CSF (Spinal Fluid) from CSF Tap, Tube 3 Updated:  01/22/20 0927     AFB Culture & Smear Culture in progress     AFB CULTURE STAIN No acid fast bacilli seen.    Gram stain [[id number]] Collected:  01/21/20 0026    Order Status:  Canceled Specimen:  CSF (Spinal Fluid) from CSF Tap, Tube 3     Influenza A & B by Molecular [[id number]] Collected:  01/20/20 0840    Order Status:  Completed Specimen:  Nasopharyngeal Swab Updated:  01/20/20 1017     Influenza A, Molecular Negative     Influenza B, Molecular Negative     Flu A & B Source NP            Significant Imaging:   No new perinent imaging overnight

## 2020-01-26 NOTE — PROGRESS NOTES
[facility]  Neurology  Progress Note    Patient Name: Vaughn Retana  MRN: [id number]  Admission Date: 1/20/2020  Hospital Length of Stay: 6 days  Code Status: Full Code   Attending Provider: Jennifer Dahl DO  Primary Care Physician: Cori Randall MD     Principal Problem:Encephalopathy      Subjective:     Interval History:   No acute events overnight, remains intubated and encephalopathic.      Current Facility-Administered Medications   Medication Dose Route Frequency Provider Last Rate Last Dose    0.9%  NaCl infusion   Intravenous Once Glenis Benavidez DNP, FNP-C        acetaminophen tablet 650 mg  650 mg Per NG tube Q6H PRN Yomaira BO Arsenio, NP   650 mg at 01/22/20 2010    carvedilol tablet 3.125 mg  3.125 mg Per OG tube BID  Adore Stephens PA-C   3.125 mg at 01/26/20 1642    chlorhexidine 0.12 % solution 15 mL  15 mL Mouth/Throat BID Bharat Ramirez NP   15 mL at 01/26/20 0945    heparin (porcine) injection 5,000 Units  5,000 Units Subcutaneous Q8H Danielle Arnold MD   5,000 Units at 01/26/20 1453    levETIRAcetam in NaCl (iso-os) IVPB 500 mg  500 mg Intravenous Q12H Dakota Alfredo  mL/hr at 01/26/20 0945 500 mg at 01/26/20 0945    multivitamin liquid no.118 per dose 10 mL  10 mL Oral Daily Danielle Arnold MD   10 mL at 01/26/20 1449    pantoprazole suspension 40 mg  40 mg Per NG tube BID Hero Burton MD   40 mg at 01/26/20 0945    sevelamer carbonate pwpk 1.6 g  1.6 g Per OG tube TID Bryan Merrill MD   1.6 g at 01/26/20 1449    sodium chloride 0.9% flush 10 mL  10 mL Intravenous PRN Colt Gonzales MD        thiamine (B-1) 100 mg in dextrose 5 % 50 mL IVPB  100 mg Intravenous Daily Danielle Arnold MD 12.5 mL/hr at 01/26/20 1233 100 mg at 01/26/20 1233       Review of Systems   Constitutional: Negative for fever.   Respiratory:        +intubated   Neurological: Positive for weakness.     Objective:     Vital Signs (Most  Recent):  Temp: 98.6 °F (37 °C) (01/26/20 1505)  Pulse: 79 (01/26/20 1700)  Resp: 16 (01/26/20 1700)  BP: (!) 146/65 (01/26/20 1700)  SpO2: 99 % (01/26/20 1700) Vital Signs (24h Range):  Temp:  [98.1 °F (36.7 °C)-98.9 °F (37.2 °C)] 98.6 °F (37 °C)  Pulse:  [77-91] 79  Resp:  [8-19] 16  SpO2:  [95 %-100 %] 99 %  BP: (115-153)/(55-71) 146/65     Weight: 57 kg (125 lb 10.6 oz)  Body mass index is 20.28 kg/m².    Physical Exam   Constitutional: He appears well-developed.   Cardiovascular: Normal rate and regular rhythm.   Pulmonary/Chest:   +intubated, coarse mechanical breath sounds b/l lung fields   Abdominal: Soft.   Neurological:   Reflex Scores:       Bicep reflexes are 1+ on the right side and 1+ on the left side.       Brachioradialis reflexes are 1+ on the right side and 1+ on the left side.      NEUROLOGICAL EXAMINATION:     MENTAL STATUS        Not on any sedation  Intermittently awakens to sound or light touch on chest before immediately returning to sleep.  Does not follow any commands.     CRANIAL NERVES        PERRL  Oculocephalic intact   Blinks to threat R>L (minimal on L)  Cough intact, gag weak     MOTOR EXAM   Muscle bulk: normal  Overall muscle tone: decreased       Limited by AMS  LLE amputated      REFLEXES     Reflexes   Right brachioradialis: 1+  Left brachioradialis: 1+  Right biceps: 1+  Left biceps: 1+  Right plantar: normal    SENSORY EXAM        Exam limited by AMS     GAIT AND COORDINATION        Exam limited by AMS       Significant Labs:   Recent Results (from the past 12 hour(s))   CBC auto differential    Collection Time: 01/26/20  4:57 PM   Result Value Ref Range    WBC 5.31 3.90 - 12.70 K/uL    RBC 3.57 (L) 4.60 - 6.20 M/uL    Hemoglobin 10.0 (L) 14.0 - 18.0 g/dL    Hematocrit 33.2 (L) 40.0 - 54.0 %    Mean Corpuscular Volume 93 82 - 98 fL    Mean Corpuscular Hemoglobin 28.0 27.0 - 31.0 pg    Mean Corpuscular Hemoglobin Conc 30.1 (L) 32.0 - 36.0 g/dL    RDW 16.2 (H) 11.5 - 14.5 %     Platelets 210 150 - 350 K/uL    MPV 10.8 9.2 - 12.9 fL    Immature Granulocytes 0.4 0.0 - 0.5 %    Gran # (ANC) 3.0 1.8 - 7.7 K/uL    Immature Grans (Abs) 0.02 0.00 - 0.04 K/uL    Lymph # 1.0 1.0 - 4.8 K/uL    Mono # 0.8 0.3 - 1.0 K/uL    Eos # 0.4 0.0 - 0.5 K/uL    Baso # 0.01 0.00 - 0.20 K/uL    nRBC 0 0 /100 WBC    Gran% 56.3 38.0 - 73.0 %    Lymph% 19.2 18.0 - 48.0 %    Mono% 15.6 (H) 4.0 - 15.0 %    Eosinophil% 8.3 (H) 0.0 - 8.0 %    Basophil% 0.2 0.0 - 1.9 %    Differential Method Automated        Microbiology Results (last 7 days)     Procedure Component Value Units Date/Time    Blood culture [[id number]] Collected:  01/23/20 0643    Order Status:  Completed Specimen:  Blood from Peripheral, Wrist, Left Updated:  01/26/20 0812     Blood Culture, Routine No Growth to date      No Growth to date      No Growth to date      No Growth to date    Blood culture [[id number]] Collected:  01/23/20 0612    Order Status:  Completed Specimen:  Blood from Peripheral, Jugular, External Right Updated:  01/26/20 0812     Blood Culture, Routine No Growth to date      No Growth to date      No Growth to date      No Growth to date    CSF culture [[id number]] Collected:  01/21/20 0022    Order Status:  Completed Specimen:  CSF (Spinal Fluid) from CSF Tap, Tube 3 Updated:  01/26/20 0735     CSF CULTURE No Growth     Gram Stain Result No WBC's, epithelial cells or organisms seen    Blood Culture #1 **CANNOT BE ORDERED STAT** [[id number]] Collected:  01/20/20 0832    Order Status:  Completed Specimen:  Blood from Peripheral, Antecubital, Left Updated:  01/25/20 1412     Blood Culture, Routine No growth after 5 days.    Blood Culture #2 **CANNOT BE ORDERED STAT** [[id number]] Collected:  01/20/20 0840    Order Status:  Completed Specimen:  Blood from Peripheral, Wrist, Left Updated:  01/25/20 1412     Blood Culture, Routine No growth after 5 days.    Culture, Respiratory with Gram Stain [[id number]] Collected:  01/23/20 1015    Order  Status:  Completed Specimen:  Respiratory from Sputum Updated:  01/25/20 0823     Respiratory Culture Normal respiratory joy     Gram Stain (Respiratory) <10 epithelial cells per low power field.     Gram Stain (Respiratory) No WBC's     Gram Stain (Respiratory) No organisms seen    Fungus culture [[id number]] Collected:  01/21/20 0026    Order Status:  Completed Specimen:  CSF (Spinal Fluid) from CSF Tap, Tube 3 Updated:  01/22/20 1012     Fungus (Mycology) Culture Culture in progress    AFB Culture & Smear [[id number]] Collected:  01/21/20 0026    Order Status:  Completed Specimen:  CSF (Spinal Fluid) from CSF Tap, Tube 3 Updated:  01/22/20 0927     AFB Culture & Smear Culture in progress     AFB CULTURE STAIN No acid fast bacilli seen.    Gram stain [[id number]] Collected:  01/21/20 0026    Order Status:  Canceled Specimen:  CSF (Spinal Fluid) from CSF Tap, Tube 3     Influenza A & B by Molecular [[id number]] Collected:  01/20/20 0840    Order Status:  Completed Specimen:  Nasopharyngeal Swab Updated:  01/20/20 1017     Influenza A, Molecular Negative     Influenza B, Molecular Negative     Flu A & B Source NP            Significant Imaging:   No new perinent imaging overnight      Assessment and Plan:     * Encephalopathy  Mr. 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  He has been persistently altered with minimal improvement, but has occasional fluctuations in exam.    Recommendations  -Connect to overnight EEG  -Continue keppra 500 mg BID  -Check ammonia, continue thiamine repletion pending lab result      ESRD on hemodialysis  Continue dialysis per  primary team and Nephrology    Seizure  See encephalopathy section for discussion/plan      Gastrointestinal hemorrhage with hematemesis  Continue present mgt per primary team    GERD with esophagitis  Continue present mgt per primary team    Severe malnutrition  Continue present mgt per primary team      VTE Risk Mitigation (From admission, onward)         Ordered     heparin (porcine) injection 5,000 Units  Every 8 hours      01/24/20 1101     Place sequential compression device  Until discontinued      01/20/20 1503     IP VTE HIGH RISK PATIENT  Once      01/20/20 1503                Gilles Sparks MD  Neurology  [facility]

## 2020-01-26 NOTE — PROGRESS NOTES
[facility]  Critical Care Medicine  Progress Note    Patient Name: Vaughn Retana  MRN: [id number]  Admission Date: 1/20/2020  Hospital Length of Stay: 6 days  Code Status: Full Code  Attending Provider: Jennifer Dahl DO  Primary Care Provider: Cori Randall MD   Principal Problem: Encephalopathy    Subjective:     HPI:  Patient is a 54 year old male with extensive medical history including ESRD on dialysis MWF (has not received it today), L below knee amputation 2/2 osteomyelitis, dCHF (last echo 8/2/19 [facility]), GERD, h/o multiple ICH w/o residual deficits, and multiple instances of GI bleed (last EGD 11/12/19, esophagitis) who presents to ED [facility] nursing home due to altered mental status. From NH report, nursing home staff went to wake the patient for his morning dialysis. He was confused, lethargic, had a moderate amount of brown colored emesis in his trash can. Patient last got dialysis on Friday. Patient is normally able to ambulate on his own and is alert and oriented; nursing staff reports that he appeared normal yesterday.       At the time of ICU consult, initial work up showed largely unremarkable cbc with no leukocytosis. CMP significant for a bicarb of 36; patient is ESRD with creatinine of 10.8. Patient still makes some urine, and UA was without evidence for UTI. Lactic acid acid was mildly elevated at 2.4 and troponin mildly elevated at .348->.339. INR is at 1.2. Alcohol and drug screen negative. Patient was given versed prior to undergoing CT scan; which showed no acute intraparenchymal hemorrhage or major vascular distribution, senescent changes, and remote lacunar type basal ganglia infarct. Shortly after CT, patient had a witnessed tonic clonic seizure. He received 2 mg of ativan and 1500 of keppra. Patient became non-responsive afterwards and was severely hypertensive with systolic bp in the 200's. Patient was intubated for airway protection and started on  propofol and Cardene drip for hypertension. Family updated over the phone and at bedside.                Hospital/ICU Course:  Patient admitted to critical care medicine on 1/20 with concerns of new onset seizures and s/p intubation for airway protection. Patient was started on vanc, rocephin, ampicillin, and acyclovir to cover for meningitis. Patient had spot EEG while on propofol in ED which did not show seizure activity. MRI done showed chronic changes but no acute abnormalities. Patient was taken off cardene drip soon after he arrived in ICU. Lumbar puncture was done after MRI. CSF labs were not convincing for bacterial or viral meningitis; although cultures are pending. Continuous EEG was done after propofol was stopped which showed epileptiform discharges. Per epilepsy, patient was given another dose of Keppra, will follow up after checking levels as patient is ESRD. Nephrology consulted and started HD. Pt became febrile the night of 01/22; blood cultures repeated and restarted on Vanc and Zosyn. HSV PCR neg so Acyclovir discontinued. Neurology would like SBP < 160; initiated home Carvedilol. Pt became hypotensive with HD on 1/24 so unable to remove fluid off during dialysis. BP trending back up and stable. Neurology will monitor patient again with EEG for seizures.         Interval History/Significant Events:   NAONE.    Off all sedation. No neurological improvement. Intubated, PEEP 5 and FiO2 21%. No neurologic improvement.      Review of Systems   Unable to perform ROS: Intubated     Objective:     Vital Signs (Most Recent):  Temp: 98.5 °F (36.9 °C) (01/26/20 1105)  Pulse: 80 (01/26/20 1129)  Resp: 15 (01/26/20 1129)  BP: 126/60 (01/26/20 1105)  SpO2: 99 % (01/26/20 1129) Vital Signs (24h Range):  Temp:  [98.1 °F (36.7 °C)-98.9 °F (37.2 °C)] 98.5 °F (36.9 °C)  Pulse:  [79-91] 80  Resp:  [8-19] 15  SpO2:  [95 %-100 %] 99 %  BP: (105-152)/(46-71) 126/60   Weight: 57 kg (125 lb 10.6 oz)  Body mass index is  20.28 kg/m².      Intake/Output Summary (Last 24 hours) at 1/26/2020 1139  Last data filed at 1/26/2020 1105  Gross per 24 hour   Intake 1205 ml   Output --   Net 1205 ml       Physical Exam   Constitutional: No distress.   HENT:   Head: Normocephalic and atraumatic.   Mouth/Throat: No oropharyngeal exudate.   Lower lip swelling improving.    Eyes: Pupils are equal, round, and reactive to light.   Neck: No JVD present.   Cardiovascular: Normal rate, regular rhythm and normal heart sounds.   No murmur heard.  Pulmonary/Chest:   Vent Mode: Spont  Oxygen Concentration (%):  (21) 21  Resp Rate Total:  (14 br/min-31 br/min) 18 br/min  Vt Set:  (340 mL) 340 mL  PEEP/CPAP:  (5 cmH20) 5 cmH20  Pressure Support:  (10 cmH20) 10 cmH20  Mean Airway Pressure:  (7.9 idN51-14 cmH20) 8.4 cmH20   Abdominal: Soft. He exhibits no distension. There is no tenderness.   Musculoskeletal:   Left below knee amputation   Neurological:   PEERL. Does not track with his eyes. Minimal movement in L stump and LLE to pain. Cough, gag and corneal reflexes intact.    Skin: Skin is warm and dry. Capillary refill takes less than 2 seconds.   Vitals reviewed.      Vents:  Vent Mode: A/C (01/26/20 1129)  Ventilator Initiated: Yes (01/20/20 1327)  Set Rate: 12 bmp (01/26/20 1129)  Vt Set: 340 mL (01/24/20 1403)  Pressure Support: 10 cmH20 (01/26/20 0159)  PEEP/CPAP: 5 cmH20 (01/26/20 1129)  Oxygen Concentration (%): 21 (01/26/20 1129)  Peak Airway Pressure: 16 cmH2O (01/26/20 1129)  Plateau Pressure: 11 cmH20 (01/26/20 1129)  Total Ve: 6 mL (01/26/20 1129)  F/VT Ratio<105 (RSBI): (!) 32.4 (01/26/20 1129)  Lines/Drains/Airways     Drain                 Hemodialysis AV Fistula Right upper arm -- days         Hemodialysis AV Fistula Right upper arm -- days         NG/OG Tube 01/20/20 1932 [place] sump Right nostril 5 days          Airway                 Airway - Non-Surgical 01/20/20 1325 Endotracheal Tube-Hi/Lo 5 days          Peripheral Intravenous Line                  Peripheral IV - Single Lumen 01/21/20 0348 20 G Anterior;Left Forearm 5 days         Peripheral IV - Single Lumen 01/22/20 0012 20 G Anterior;Left;Proximal Forearm 4 days              Significant Labs:    CBC/Anemia Profile:  Recent Labs   Lab 01/25/20  0420 01/25/20  1626 01/26/20  0330   WBC 6.11 7.23 7.16   HGB 11.5* 12.1* 11.2*   HCT 37.2* 37.5* 36.3*    210 235   MCV 91 89 91   RDW 15.9* 15.9* 16.0*        Chemistries:  Recent Labs   Lab 01/25/20  0420 01/26/20  0330    140   K 4.4 4.6    101   CO2 23 22*   BUN 48* 70*   CREATININE 6.8* 8.8*   CALCIUM 9.4 9.7   ALBUMIN 2.4* 2.5*   PROT 7.9 8.3   BILITOT 0.5 0.5   ALKPHOS 179* 181*   ALT 29 38   AST 45* 53*   MG 2.3 2.6   PHOS 4.6* 4.8*       All pertinent labs within the past 24 hours have been reviewed.    Significant Imaging:  I have reviewed all pertinent imaging results/findings within the past 24 hours.      ABG  Recent Labs   Lab 01/21/20  2334   PH 7.361   PO2 40   PCO2 53.2*   HCO3 30.1*   BE 5     Assessment/Plan:     Neuro  Seizure  Altered mental status    Patient initially presented to ED prior to getting dialysis due to AMS and brown emesis. Had witnessed tonic clonic seizure requiring ativan shortly after getting a CT head. He was loaded with 1500 mg Keppra, intubated for airway protection, and started on propofol for sedation. Upon physical exam, patient remained unresponsive to verbal or tactile stimuli and had upward left sided gaze with sluggish pupils. Concern for status epilepticus. Differential diagnosis includes polysubstance, sepsis, intracranial abnormalities, and PRES.    - Spot EEG without evidence of current seizure. However patient already received keppra, ativan, and sedated on propofol. 24 hour EEG with L sided structural lesion and underlying epileptiform potential. Initiated 1500 mg keppra on 01/22, per Neurology recs. Continue Keppra 500 mg BID.   - Substance induced: UDS negative, alcohol level  wnl  - Sepsis: Initial lactate 2.4, likely due to seizure event; repeat was 1.9. Patient received 500 ml saline in ED. Initial blood cultures, sputum culture+gram stain neg  Lumbar puncture done, CSF not convincing for meningitis, so ampicillin, vancomycin, rocephin d/c'd on 01/22. However, pt was febrile on 11/23 to 101.3. Blood cultures repeated and restarted on Vanc and Zosyn. Repeat cultures NGTD. HSV PCR neg; Acyclovir discontinued. Prt has remained afebrile for > 72 hours. Will discontinue Vanc and Zosyn.   - Intracranial: patient with history of ICH with no functional deficits. Possibly multiple foci for seizure overtime. CT without acute changes, MRI brain with chronic changes and hypertensive angiopathy; no acute changes.  - PRES: Patient off cardene drip. MRI reviewed. Will follow blood pressure as patient is normally hypotensive.   - Neurology would like to repeat EEG to ensure that pt is not having any seizures.        Cardiac/Vascular  Renovascular hypertension  Patient on coreg at nursing home; has been compliant as given by nursing home. Patient with hypertensive emergency on admit and started Cardene drip to titrate to BP of 160-180. Off Cardene drip since 01/20. Goal SBP < 160 per Neurology. Pt started on home Carvedilol on 01/24; BP well controlled.     -- Carvedilol 3.125 mg BID    Chronic diastolic heart failure  Last echo done at [facility] 8/2/19, EF>55%, bi-atrial enlargement      Renal/  ESRD on hemodialysis  Patient normally MWF dialysis. Last full dialysis session prior to admission was Friday 01/17/20.   Nephrology following for HD management.       Endocrine  Severe malnutrition  Nutrition consulted  Novasource @ 30 mL/hr to provide 1440 kcals, 65 g of protein, 516 mL fluid.     GI  Gastrointestinal hemorrhage with hematemesis  GERD with esophagitis    Patient with reports of coffee ground emesis prior to transport to ED. In ED, dark brown contents suctioned from stomach. Hemoglobin remains  stable at 15 and patient is hypertensive. Patient is well known to GI. His most recent scope was in November that just showed esophagitis similar to his previous EGD. GI consulted and appreciate recommendations. Currently on 40 mg BID.      - BID CBC, transfuse as needed  - Continue Pantoprazole 40 mg BID       Critical Care Daily Checklist:    A: Awake: RASS Goal/Actual Goal: RASS Goal: 0-->alert and calm  Actual: Maddox Agitation Sedation Scale (RASS): Drowsy   B: Spontaneous Breathing Trial Performed?     C: SAT & SBT Coordinated?  n/a                      D: Delirium: CAM-ICU Overall CAM-ICU: Negative   E: Early Mobility Performed? No   F: Feeding Goal: Goals: Meet % EEN, EPN  Status: Nutrition Goal Status: goal met   Current Diet Order   No orders of the defined types were placed in this encounter.      AS: Analgesia/Sedation n/a   T: Thromboembolic Prophylaxis Heparin SQ   H: HOB > 300 Yes   U: Stress Ulcer Prophylaxis (if needed) protonix   G: Glucose Control n/a   B: Bowel Function Stool Occurrence: 1   I: Indwelling Catheter (Lines & Arcos) Necessity PIV   D: De-escalation of Antimicrobials/Pharmacotherapies D/c all abx     Plan for the day/ETD Off abx, repeat EEG    Code Status:  Family/Goals of Care: Full Code         Critical secondary to Patient has a condition that poses threat to life and bodily function: Severe Respiratory Distress, Renal failure.       Critical care was time spent personally by me on the following activities: development of treatment plan with patient or surrogate and bedside caregivers, discussions with consultants, evaluation of patient's response to treatment, examination of patient, ordering and performing treatments and interventions, ordering and review of laboratory studies, ordering and review of radiographic studies, pulse oximetry, re-evaluation of patient's condition. This critical care time did not overlap with that of any other provider or involve time for any  procedures.     Danielle Arnold MD  Critical Care Medicine  [facility]

## 2020-01-26 NOTE — ASSESSMENT & PLAN NOTE
Last echo done at [facility] 8/2/19, EF>55%, bi-atrial enlargement

## 2020-01-27 NOTE — ASSESSMENT & PLAN NOTE
Altered mental status    Patient initially presented to ED prior to getting dialysis due to AMS and brown emesis. Had witnessed tonic clonic seizure requiring ativan shortly after getting a CT head. He was loaded with 1500 mg Keppra, intubated for airway protection, and started on propofol for sedation. Upon physical exam, patient remained unresponsive to verbal or tactile stimuli and had upward left sided gaze with sluggish pupils. Concern for status epilepticus. Differential diagnosis includes polysubstance, sepsis, intracranial abnormalities, and PRES.    - Spot EEG without evidence of current seizure. However patient already received keppra, ativan, and sedated on propofol. 24 hour EEG with L sided structural lesion and underlying epileptiform potential. Initiated 1500 mg keppra on 01/22, per Neurology recs. Continue Keppra 500 mg BID.   - Substance induced: UDS negative, alcohol level wnl  - Sepsis: Initial lactate 2.4, likely due to seizure event; repeat was 1.9. Patient received 500 ml saline in ED. Initial blood cultures, sputum culture+gram stain neg  Lumbar puncture done, CSF not convincing for meningitis, so ampicillin, vancomycin, rocephin d/c'd on 01/22. However, pt was febrile on 11/23 to 101.3. Blood cultures repeated and restarted on Vanc and Zosyn. Repeat cultures NGTD. HSV PCR neg; Acyclovir discontinued. Prt has remained afebrile for > 72 hours. Vanc and Zosyn discontinued 01/26.   - Intracranial: patient with history of ICH with no functional deficits. Possibly multiple foci for seizure overtime. CT without acute changes, MRI brain with chronic changes and hypertensive angiopathy; no acute changes.  - PRES: Patient off cardene drip. MRI reviewed. Will follow blood pressure as patient is normally hypotensive.   - Neurology would like to repeat EEG to ensure that pt is not having any seizures. Finishing 15:30 on 01/27. Will f/u EEG results and Neurology recommendations.

## 2020-01-27 NOTE — ASSESSMENT & PLAN NOTE
GERD with esophagitis    Patient with reports of coffee ground emesis prior to transport to ED. In ED, dark brown contents suctioned from stomach. Hemoglobin remains stable at 15 and patient is hypertensive. Patient is well known to GI. His most recent scope was in November that just showed esophagitis similar to his previous EGD. GI consulted and appreciate recommendations. Currently on 40 mg BID.      - H/H stable, will space CBC to daily  - PPI d/c'd with no further stigmata of bleeding. Famotidine ppx started 01/27

## 2020-01-27 NOTE — PROGRESS NOTES
HD initiated, , cannulated upper left arm avf with 15 gauge needles . Good blood flow noted. /  UF goal set  For 1 to 2 liters as tolerated. B/p  139/65 pulse 85 . Patient withdrawn .

## 2020-01-27 NOTE — SUBJECTIVE & OBJECTIVE
Interval History/Significant Events:   NAEON. Off all sedation other than AED. Intubated, PEEP 5 and FiO2 21%. Some neurologic improvement - opening eyes spontaneously and occasionally tracking.        Review of Systems   Unable to perform ROS: Intubated     Objective:     Vital Signs (Most Recent):  Temp: 97.8 °F (36.6 °C) (01/27/20 1105)  Pulse: 91 (01/27/20 1228)  Resp: 17 (01/27/20 1228)  BP: (!) 120/50 (01/27/20 1228)  SpO2: 96 % (01/27/20 1228) Vital Signs (24h Range):  Temp:  [97.8 °F (36.6 °C)-98.8 °F (37.1 °C)] 97.8 °F (36.6 °C)  Pulse:  [75-99] 91  Resp:  [12-28] 17  SpO2:  [91 %-100 %] 96 %  BP: (105-156)/(41-70) 120/50   Weight: 57 kg (125 lb 10.6 oz)  Body mass index is 20.28 kg/m².      Intake/Output Summary (Last 24 hours) at 1/27/2020 1311  Last data filed at 1/27/2020 1228  Gross per 24 hour   Intake 1135 ml   Output 2000 ml   Net -865 ml       Physical Exam   Constitutional: No distress.   HENT:   Head: Normocephalic and atraumatic.   Mouth/Throat: No oropharyngeal exudate.   Eyes: Pupils are equal, round, and reactive to light.   Neck: No JVD present.   Cardiovascular: Normal rate, regular rhythm and normal heart sounds.   No murmur heard.  Pulmonary/Chest:   Intubated, mechanical breath sounds, no significant rales   Abdominal: Soft. He exhibits no distension. There is no tenderness.   Musculoskeletal:   Left below knee amputation   Neurological:   PERRL. Intermittent tracking with eyes. No withdrawal or extension to pain in b/l upper and lower extremities. Cough, gag and corneal reflexes intact.    Skin: Skin is warm and dry. Capillary refill takes less than 2 seconds.   Vitals reviewed.      Vents:  Vent Mode: A/C (01/27/20 1131)  Ventilator Initiated: Yes (01/20/20 1327)  Set Rate: 12 bmp (01/27/20 1131)  Vt Set: 340 mL (01/24/20 1403)  Pressure Support: 10 cmH20 (01/26/20 0159)  PEEP/CPAP: 5 cmH20 (01/27/20 1131)  Oxygen Concentration (%): 21 (01/27/20 1228)  Peak Airway Pressure: 16 cmH2O  (01/27/20 1131)  Plateau Pressure: 11 cmH20 (01/27/20 1131)  Total Ve: 10.7 mL (01/27/20 1131)  F/VT Ratio<105 (RSBI): (!) 68.97 (01/27/20 1131)  Lines/Drains/Airways     Drain                 Hemodialysis AV Fistula Right upper arm -- days         Hemodialysis AV Fistula Right upper arm -- days         NG/OG Tube 01/20/20 1932 [place] sump Right nostril 6 days          Airway                 Airway - Non-Surgical 01/20/20 1325 Endotracheal Tube-Hi/Lo 6 days          Peripheral Intravenous Line                 Peripheral IV - Single Lumen 01/21/20 0348 20 G Anterior;Left Forearm 6 days         Peripheral IV - Single Lumen 01/22/20 0012 20 G Anterior;Left;Proximal Forearm 5 days         Peripheral IV - Single Lumen 01/26/20 0700 20 G Anterior;Left Wrist 1 day              Significant Labs:    CBC/Anemia Profile:  Recent Labs   Lab 01/26/20  0330 01/26/20  1657 01/27/20  0312   WBC 7.16 5.31 5.92   HGB 11.2* 10.0* 11.2*   HCT 36.3* 33.2* 36.0*    210 253   MCV 91 93 91   RDW 16.0* 16.2* 15.9*        Chemistries:  Recent Labs   Lab 01/26/20  0330 01/27/20  0312    141   K 4.6 4.7    101   CO2 22* 20*   BUN 70* 95*   CREATININE 8.8* 10.1*   CALCIUM 9.7 10.0   ALBUMIN 2.5* 2.3*   PROT 8.3 7.9   BILITOT 0.5 0.4   ALKPHOS 181* 175*   ALT 38 41   AST 53* 47*   MG 2.6 2.7*   PHOS 4.8* 5.1*       All pertinent labs within the past 24 hours have been reviewed.    Significant Imaging:  I have reviewed all pertinent imaging results/findings within the past 24 hours.

## 2020-01-27 NOTE — PROGRESS NOTES
[facility]    Vaughn Retana is a 54 y.o. male currently on hemodialysis for removal of uremic toxins and volume.     Patient seen and evaluated on hemodialysis, tolerating treatment, see HD flowsheet for vitals and assessments.    No Hypotension, chest pain, shortness of breath, cramping, nausea or vomiting.      Labs have been reviewed and the dialysate bath has been adjusted.    Labs:      Recent Labs   Lab 01/25/20  0420 01/26/20  0330 01/27/20  0312    140 141   K 4.4 4.6 4.7    101 101   CO2 23 22* 20*   BUN 48* 70* 95*   CREATININE 6.8* 8.8* 10.1*   CALCIUM 9.4 9.7 10.0   PHOS 4.6* 4.8* 5.1*       Recent Labs   Lab 01/26/20  0330 01/26/20  1657 01/27/20  0312   WBC 7.16 5.31 5.92   HGB 11.2* 10.0* 11.2*   HCT 36.3* 33.2* 36.0*    210 253        Assessment/Plan    Ultrafiltration goal: 1-2 L as tolerated, keep MAP >65.  - Seen on dialysis this morning, tolerating session with current UFR, no complications.    - No big improvement in neurological status, EEG restarted on yesterday. Neurology following.   - No lab stick/BP intake on access site  - Will continue dialysis treatments while in-patient  - Continue to monitor intake and output, daily weights   - Avoid nephrotoxic medication and renal dose medications to GFR    Anemia of ESRD  - Hgb 11.2, no epogen     BMM  - Novasource TF  - Novasource with meals  - Phos 5.1, on binders   - Daily renal function panel     Glenis Benavidez, ADILIA, APRN, FNP-C  Nephrology Department  Pager:  [phone]

## 2020-01-27 NOTE — ASSESSMENT & PLAN NOTE
Last echo done at [facility] [place] 8/2/19, EF>55%, bi-atrial enlargement

## 2020-01-27 NOTE — ASSESSMENT & PLAN NOTE
Patient normally MWF dialysis. Last full dialysis session prior to admission was Friday 01/17/20.   Nephrology following for HD management. Will monitor for hypotension - last dialysis 01/27.

## 2020-01-27 NOTE — PLAN OF CARE
CMICU DAILY GOALS       A: Awake    RASS: Goal - RASS Goal: 0-->alert and calm  Actual - RASS ([place]addox Agitation-Sedation Scale): -1-->drowsy   Restraint necessity: Clinical Justification: Treatment Interference  B: Breath   SBT: Not attempted   C: Coordinate A & B, analgesics/sedatives   Pain: managed    SAT: Not attempted  D: Delirium   CAM-ICU: Overall CAM-ICU: Positive  E: Early Mobility   MOVE Screen: Fail   Activity: Activity Management: bedrest maintained per order  FAS: Feeding/Nutrition   Diet order: Diet/Nutrition Received: NPO, tube feeding,   Fluid restriction:    T: Thrombus   DVT prophylaxis: VTE Required Core Measure: Pharmacological prophylaxis initiated/maintained  H: HOB Elevation   Head of Bed (HOB): HOB at 30-45 degrees  U: Ulcer Prophylaxis   GI: yes  G: Glucose control   managed Glycemic Management: blood glucose monitoring  S: Skin   Bundle compliance: yes   Bathing/Skin Care: bath, chlorhexidine, linen changed Date: 1/26/20 AM shift   B: Bowel Function   constipation   I: Indwelling Catheters   Arcos necessity: [REMOVED]      Urethral Catheter 01/20/20 1950 Straight-tip 16 Fr.-Reason for Continuing Urinary Catheterization: Critically ill in ICU requiring intensive monitoring   CVC necessity: No   IPAD offered: Not appropriate  D: De-escalation Antibx   No  Plan for the day   24hr EEG monitoring; goals of care discussion with family.  Family/Goals of care/Code Status   Code Status: Full Code     No acute events throughout day, VS and assessment per flow sheet, patient progressing towards goals as tolerated, plan of care reviewed with Vaughn Retana and family, all concerns addressed, will continue to monitor.

## 2020-01-27 NOTE — PROGRESS NOTES
[facility]  Neurology  Progress Note    Patient Name: Vaughn Retana  MRN: [id number]  Admission Date: 1/20/2020  Hospital Length of Stay: 7 days  Code Status: Full Code   Attending Provider: Ricky Morgan MD  Primary Care Physician: Cori Randall MD   Principal Problem:Seizure      Subjective:     Interval History:   Remains intubated, off sedation >24 hr  Has been on extended EEG--no clinical events concerning for seizure. EEG reviewed by Dr. Newell today, which showed  diffuse cortical dysfunction c/w moderate encephalopathy. No prominent focal findings, no epileptiform discharges, and no electrographic seizures.    Current Neurological Medications:   Keppra 500 mg BID    Current Facility-Administered Medications   Medication Dose Route Frequency Provider Last Rate Last Dose    0.9%  NaCl infusion   Intravenous Once Glenis Benavidez DNP, [place]        0.9%  NaCl infusion   Intravenous Once Glenis Benavidez DNP, [facility]IAN[facility]        acetaminophen tablet 650 mg  650 mg Per NG tube Q6H PRN Yomaira H. Arsenio, NP   650 mg at 01/22/20 2010    carvedilol tablet 3.125 mg  3.125 mg Per OG tube BID Ricky Mrogan MD   Stopped at 01/27/20 0800    chlorhexidine 0.12 % solution 15 mL  15 mL Mouth/Throat BID Bharat Ramirez NP   15 mL at 01/27/20 0924    famotidine 40 mg/5 mL (8 mg/mL) suspension 20 mg  20 mg Oral Daily Yimi Taylor MD   20 mg at 01/27/20 1209    glycerin 99.5% topical solution 100 mL  100 mL Rectal ED 1 Time Yimi Taylor MD        And    magnesium citrate solution 296 mL  296 mL Rectal ED 1 Time Yimi Taylor MD        And    sodium chloride 0.9% bolus 500 mL  500 mL Rectal ED 1 Time Yimi Taylor MD        heparin (porcine) injection 5,000 Units  5,000 Units Subcutaneous Q8H Danielle Arnold MD   5,000 Units at 01/27/20 1500    levETIRAcetam in NaCl (iso-os) IVPB 500 mg  500 mg Intravenous Q12H Dakota Alfredo  mL/hr at 01/27/20 0814 500 mg at 01/27/20 0814    multivitamin liquid  no.118 per dose 10 mL  10 mL Oral Daily Danielle Arnold MD   10 mL at 01/27/20 0924    sevelamer carbonate pwpk 1.6 g  1.6 g Per OG tube TID Bryan Merrill MD   1.6 g at 01/27/20 1545    sodium chloride 0.9% flush 10 mL  10 mL Intravenous PRN Colt Gonzales MD        thiamine (B-1) 100 mg in dextrose 5 % 50 mL IVPB  100 mg Intravenous Daily Danielle Arnold MD 12.5 mL/hr at 01/27/20 1300 100 mg at 01/27/20 1300     Review of Systems   Unable to perform ROS: Intubated     Objective:     Vital Signs (Most Recent):  Temp: 98 °F (36.7 °C) (01/27/20 1505)  Pulse: 85 (01/27/20 1704)  Resp: 17 (01/27/20 1704)  BP: (!) 138/53 (01/27/20 1704)  SpO2: 98 % (01/27/20 1704) Vital Signs (24h Range):  Temp:  [97.8 °F (36.6 °C)-98.8 °F (37.1 °C)] 98 °F (36.7 °C)  Pulse:  [75-99] 85  Resp:  [12-28] 17  SpO2:  [91 %-100 %] 98 %  BP: (105-156)/(41-70) 138/53     Weight: 57 kg (125 lb 10.6 oz)  Body mass index is 20.28 kg/m².    Physical Exam    NEUROLOGICAL EXAMINATION:     MENTAL STATUS        Intubated, off sedation >24 hr   Eyes slightly open  Does not track room or attend to voice  Not following commands      CRANIAL NERVES     CN III, IV, VI   Nystagmus: none        Pupils reactive  Corneal, cough and gag intact     MOTOR EXAM   Muscle bulk: normal       Not following commands  No abnormal movements noted      GAIT AND COORDINATION     Tremor   Resting tremor: absent    Significant Labs:   Hemoglobin A1c: No results for input(s): HGBA1C in the last 720 hours.  Blood Culture: No results for input(s): LABBLOO in the last 48 hours.  CBC:   Recent Labs   Lab 01/26/20  0330 01/26/20  1657 01/27/20  0312   WBC 7.16 5.31 5.92   HGB 11.2* 10.0* 11.2*   HCT 36.3* 33.2* 36.0*    210 253     CMP:   Recent Labs   Lab 01/26/20  0330 01/27/20  0312   * 191*    141   K 4.6 4.7    101   CO2 22* 20*   BUN 70* 95*   CREATININE 8.8* 10.1*   CALCIUM 9.7 10.0   MG 2.6 2.7*   PROT 8.3 7.9   ALBUMIN 2.5*  2.3*   BILITOT 0.5 0.4   ALKPHOS 181* 175*   AST 53* 47*   ALT 38 41   ANIONGAP 17* 20*   EGFRNONAA 6.1* 5.2*     Inflammatory Markers: No results for input(s): SEDRATE, CRP, PROCAL in the last 48 hours.  Urine Culture: No results for input(s): LABURIN in the last 48 hours.  Urine Studies: No results for input(s): COLORU, APPEARANCEUA, PHUR, SPECGRAV, PROTEINUA, GLUCUA, KETONESU, BILIRUBINUA, OCCULTUA, NITRITE, UROBILINOGEN, LEUKOCYTESUR, RBCUA, WBCUA, BACTERIA, SQUAMEPITHEL, HYALINECASTS in the last 48 hours.    Invalid input(s): WRIGHTSUR  All pertinent lab results from the past 24 hours have been reviewed.    EEG (1/26-1/27/20) 15 hr 25 min  abnormal continuous EEG monitoring study because because of generalized background slowing consistent with diffuse cortical dysfunction and a moderate encephalopathy.  This finding is nonspecific with regards to etiology but can be seen in the setting of toxic/metabolic derangements, infection, and as a medication effect.  There are no pushbutton activations, no prominent focal findings, no epileptiform discharges, and no electrographic seizures.    Significant Imaging: I have reviewed and interpreted all pertinent imaging results/findings within the past 24 hours.     MRI Brain WO contrast (1/20/20):  No acute intracranial abnormality identified. Remote microhemorrhages throughout the brain in a predominantly central distribution suggesting hypertensive microangiopathy.  Chronic microvascular ischemic change    Assessment and Plan:     * Seizure  See encephalopathy section for discussion/plan    Gastrointestinal hemorrhage with hematemesis  Continue management per primary team    GERD with esophagitis  Continue management per primary team    Severe malnutrition  Continue management per primary team    Encephalopathy  55 y/o male with a medical history of ESRD on HD, left below the knee amputation (2/2 osteomyelitis), CHF, multiple ICH with extensive cerebral microbleeds  (5/203- left BG, 9/2016 right basal ganglia hemorrhage, 11/2016  R striatocapsular ICH with IVH) and previous GI bleeds ( EGD 2019 shows esophagitis) presented to the ED on 1/20/20 from [facility] due to altered mental status.  He has been persistently altered with minimal improvement, but has occasional fluctuations in exam.    1/27-hooked up to EEG overnight, no clinical events concerning for seizure  EEG reviewed with epilepsy team and no focal findings, just diffuse slowing   Remains uremic (BUN 95 today) and undergoing dialysis    Recommendations  --will discuss discontinuation of keppra with epilepsy team and primary  continue 500 mg BID for now  --f/u pending ammonia and B1  --continue correction of metabolic derangements per primary     ESRD on hemodialysis  Continue dialysis per primary team and Nephrology    VTE Risk Mitigation (From admission, onward)         Ordered     heparin (porcine) injection 5,000 Units  Every 8 hours      01/24/20 1101     Place sequential compression device  Until discontinued      01/20/20 1503     IP VTE HIGH RISK PATIENT  Once      01/20/20 1503              Esther Horner PA-C  General Neurology Consult  [id number]

## 2020-01-27 NOTE — SUBJECTIVE & OBJECTIVE
Interval History/Significant Events:   NAEON.    Off all sedation other than AED. Intubated, PEEP 5 and FiO2 21%. Minimal neurologic improvement.      Review of Systems   Unable to perform ROS: Intubated     Objective:     Vital Signs (Most Recent):  Temp: 98.3 °F (36.8 °C) (01/27/20 0705)  Pulse: 82 (01/27/20 0800)  Resp: 14 (01/27/20 0800)  BP: (!) 127/59 (01/27/20 0800)  SpO2: 98 % (01/27/20 0800) Vital Signs (24h Range):  Temp:  [98.3 °F (36.8 °C)-98.8 °F (37.1 °C)] 98.3 °F (36.8 °C)  Pulse:  [75-83] 82  Resp:  [12-21] 14  SpO2:  [95 %-100 %] 98 %  BP: (115-156)/(55-70) 127/59   Weight: 57 kg (125 lb 10.6 oz)  Body mass index is 20.28 kg/m².      Intake/Output Summary (Last 24 hours) at 1/27/2020 0831  Last data filed at 1/27/2020 0800  Gross per 24 hour   Intake 1305 ml   Output --   Net 1305 ml       Physical Exam   Constitutional: No distress.   HENT:   Head: Normocephalic and atraumatic.   Mouth/Throat: No oropharyngeal exudate.   Lower lip swelling improving.    Eyes: Pupils are equal, round, and reactive to light.   Neck: No JVD present.   Cardiovascular: Normal rate, regular rhythm and normal heart sounds.   No murmur heard.  Pulmonary/Chest:   Intubated, mechanical breath sounds, no significant rales   Abdominal: Soft. He exhibits no distension. There is no tenderness.   Musculoskeletal:   Left below knee amputation   Neurological:   PERRL. Intermittent tracking with eyes. No withdrawal or extension to pain in b/l upper and lower extremities. Cough, gag and corneal reflexes intact.    Skin: Skin is warm and dry. Capillary refill takes less than 2 seconds.   Vitals reviewed.      Vents:  Vent Mode: A/C (01/27/20 0715)  Ventilator Initiated: Yes (01/20/20 1327)  Set Rate: 12 bmp (01/27/20 0715)  Vt Set: 340 mL (01/24/20 1403)  Pressure Support: 10 cmH20 (01/26/20 0159)  PEEP/CPAP: 5 cmH20 (01/27/20 0715)  Oxygen Concentration (%): 21 (01/27/20 0800)  Peak Airway Pressure: 16 cmH2O (01/27/20 0715)  Plateau  Pressure: 11 cmH20 (01/27/20 0715)  Total Ve: 5.57 mL (01/27/20 0715)  F/VT Ratio<105 (RSBI): (!) 31.04 (01/27/20 0715)  Lines/Drains/Airways     Drain                 Hemodialysis AV Fistula Right upper arm -- days         Hemodialysis AV Fistula Right upper arm -- days         NG/OG Tube 01/20/20 1932 [place] sump Right nostril 6 days          Airway                 Airway - Non-Surgical 01/20/20 1325 Endotracheal Tube-Hi/Lo 6 days          Peripheral Intravenous Line                 Peripheral IV - Single Lumen 01/21/20 0348 20 G Anterior;Left Forearm 6 days         Peripheral IV - Single Lumen 01/22/20 0012 20 G Anterior;Left;Proximal Forearm 5 days         Peripheral IV - Single Lumen 01/26/20 0700 20 G Anterior;Left Wrist 1 day              Significant Labs:    CBC/Anemia Profile:  Recent Labs   Lab 01/26/20  0330 01/26/20  1657 01/27/20 0312   WBC 7.16 5.31 5.92   HGB 11.2* 10.0* 11.2*   HCT 36.3* 33.2* 36.0*    210 253   MCV 91 93 91   RDW 16.0* 16.2* 15.9*        Chemistries:  Recent Labs   Lab 01/26/20  0330 01/27/20 0312    141   K 4.6 4.7    101   CO2 22* 20*   BUN 70* 95*   CREATININE 8.8* 10.1*   CALCIUM 9.7 10.0   ALBUMIN 2.5* 2.3*   PROT 8.3 7.9   BILITOT 0.5 0.4   ALKPHOS 181* 175*   ALT 38 41   AST 53* 47*   MG 2.6 2.7*   PHOS 4.8* 5.1*       All pertinent labs within the past 24 hours have been reviewed.    Significant Imaging:  I have reviewed all pertinent imaging results/findings within the past 24 hours.

## 2020-01-27 NOTE — PROGRESS NOTES
[facility]  Critical Care Medicine  Progress Note    Patient Name: Vaughn Retana  MRN: [id number]  Admission Date: 1/20/2020  Hospital Length of Stay: 7 days  Code Status: Full Code  Attending Provider: Ricky Morgan MD  Primary Care Provider: Cori Randall MD   Principal Problem: Seizure    Subjective:     HPI:  Patient is a 54 year old male with extensive medical history including ESRD on dialysis MWF (has not received it today), L below knee amputation 2/2 osteomyelitis, dCHF (last echo 8/2/19 [facility]), GERD, h/o multiple ICH w/o residual deficits, and multiple instances of GI bleed (last EGD 11/12/19, esophagitis) who presents to ED [facility] [place] due to altered mental status. From NH report, nursing home staff went to wake the patient for his morning dialysis. He was confused, lethargic, had a moderate amount of brown colored emesis in his trash can. Patient last got dialysis on Friday. Patient is normally able to ambulate on his own and is alert and oriented; nursing staff reports that he appeared normal yesterday.       At the time of ICU consult, initial work up showed largely unremarkable cbc with no leukocytosis. CMP significant for a bicarb of 36; patient is ESRD with creatinine of 10.8. Patient still makes some urine, and UA was without evidence for UTI. Lactic acid acid was mildly elevated at 2.4 and troponin mildly elevated at .348->.339. INR is at 1.2. Alcohol and drug screen negative. Patient was given versed prior to undergoing CT scan; which showed no acute intraparenchymal hemorrhage or major vascular distribution, senescent changes, and remote lacunar type basal ganglia infarct. Shortly after CT, patient had a witnessed tonic clonic seizure. He received 2 mg of ativan and 1500 of keppra. Patient became non-responsive afterwards and was severely hypertensive with systolic bp in the 200's. Patient was intubated for airway protection and started on propofol and  Cardene drip for hypertension. Family updated over the phone and at bedside.                Hospital/ICU Course:  Patient admitted to critical care medicine on 1/20 with concerns of new onset seizures and s/p intubation for airway protection. Patient was started on vanc, rocephin, ampicillin, and acyclovir to cover for meningitis. Patient had spot EEG while on propofol in ED which did not show seizure activity. MRI done showed chronic changes but no acute abnormalities. Patient was taken off cardene drip soon after he arrived in ICU. Lumbar puncture was done after MRI. CSF labs were not convincing for bacterial or viral meningitis; although cultures are pending. Continuous EEG was done after propofol was stopped which showed epileptiform discharges. Per epilepsy, patient was given another dose of Keppra, will follow up after checking levels as patient is ESRD. Nephrology consulted and started HD. Pt became febrile the night of 01/22; blood cultures repeated and restarted on Vanc and Zosyn. HSV PCR neg so Acyclovir discontinued. Neurology would like SBP < 160; initiated home Carvedilol. Pt became hypotensive with HD on 1/24 so unable to remove fluid off during dialysis. BP trending back up and stable. Neurology will monitor patient again with EEG for seizures. 01/27 Patient's neuro exam is somewhat improved from day prior per nurse - opening eyes spontaneously and occasionally tracking movements, blinking to threat. Received dialysis today. EEG to end 15:30, will f/u Neuro recs regarding continuing Keppra or discontinuing.     Interval History/Significant Events:   NAEON. Off all sedation other than AED. Intubated, PEEP 5 and FiO2 21%. Some neurologic improvement - opening eyes spontaneously and occasionally tracking.        Review of Systems   Unable to perform ROS: Intubated     Objective:     Vital Signs (Most Recent):  Temp: 97.8 °F (36.6 °C) (01/27/20 1105)  Pulse: 91 (01/27/20 1228)  Resp: 17 (01/27/20  1228)  BP: (!) 120/50 (01/27/20 1228)  SpO2: 96 % (01/27/20 1228) Vital Signs (24h Range):  Temp:  [97.8 °F (36.6 °C)-98.8 °F (37.1 °C)] 97.8 °F (36.6 °C)  Pulse:  [75-99] 91  Resp:  [12-28] 17  SpO2:  [91 %-100 %] 96 %  BP: (105-156)/(41-70) 120/50   Weight: 57 kg (125 lb 10.6 oz)  Body mass index is 20.28 kg/m².      Intake/Output Summary (Last 24 hours) at 1/27/2020 1311  Last data filed at 1/27/2020 1228  Gross per 24 hour   Intake 1135 ml   Output 2000 ml   Net -865 ml       Physical Exam   Constitutional: No distress.   HENT:   Head: Normocephalic and atraumatic.   Mouth/Throat: No oropharyngeal exudate.   Eyes: Pupils are equal, round, and reactive to light.   Neck: No JVD present.   Cardiovascular: Normal rate, regular rhythm and normal heart sounds.   No murmur heard.  Pulmonary/Chest:   Intubated, mechanical breath sounds, no significant rales   Abdominal: Soft. He exhibits no distension. There is no tenderness.   Musculoskeletal:   Left below knee amputation   Neurological:   PERRL. Intermittent tracking with eyes. No withdrawal or extension to pain in b/l upper and lower extremities. Cough, gag and corneal reflexes intact.    Skin: Skin is warm and dry. Capillary refill takes less than 2 seconds.   Vitals reviewed.      Vents:  Vent Mode: A/C (01/27/20 1131)  Ventilator Initiated: Yes (01/20/20 1327)  Set Rate: 12 bmp (01/27/20 1131)  Vt Set: 340 mL (01/24/20 1403)  Pressure Support: 10 cmH20 (01/26/20 0159)  PEEP/CPAP: 5 cmH20 (01/27/20 1131)  Oxygen Concentration (%): 21 (01/27/20 1228)  Peak Airway Pressure: 16 cmH2O (01/27/20 1131)  Plateau Pressure: 11 cmH20 (01/27/20 1131)  Total Ve: 10.7 mL (01/27/20 1131)  F/VT Ratio<105 (RSBI): (!) 68.97 (01/27/20 1131)  Lines/Drains/Airways     Drain                 Hemodialysis AV Fistula Right upper arm -- days         Hemodialysis AV Fistula Right upper arm -- days         NG/OG Tube 01/20/20 1932 [place] Right nostril 6 days          Airway                  Airway - Non-Surgical 01/20/20 1325 Endotracheal Tube-Hi/Lo 6 days          Peripheral Intravenous Line                 Peripheral IV - Single Lumen 01/21/20 0348 20 G Anterior;Left Forearm 6 days         Peripheral IV - Single Lumen 01/22/20 0012 20 G Anterior;Left;Proximal Forearm 5 days         Peripheral IV - Single Lumen 01/26/20 0700 20 G Anterior;Left Wrist 1 day              Significant Labs:    CBC/Anemia Profile:  Recent Labs   Lab 01/26/20  0330 01/26/20  1657 01/27/20  0312   WBC 7.16 5.31 5.92   HGB 11.2* 10.0* 11.2*   HCT 36.3* 33.2* 36.0*    210 253   MCV 91 93 91   RDW 16.0* 16.2* 15.9*        Chemistries:  Recent Labs   Lab 01/26/20  0330 01/27/20  0312    141   K 4.6 4.7    101   CO2 22* 20*   BUN 70* 95*   CREATININE 8.8* 10.1*   CALCIUM 9.7 10.0   ALBUMIN 2.5* 2.3*   PROT 8.3 7.9   BILITOT 0.5 0.4   ALKPHOS 181* 175*   ALT 38 41   AST 53* 47*   MG 2.6 2.7*   PHOS 4.8* 5.1*       All pertinent labs within the past 24 hours have been reviewed.    Significant Imaging:  I have reviewed all pertinent imaging results/findings within the past 24 hours.      ABG  Recent Labs   Lab 01/21/20  2334   PH 7.361   PO2 40   PCO2 53.2*   HCO3 30.1*   BE 5     Assessment/Plan:     Neuro  * Seizure  Altered mental status    Patient initially presented to ED prior to getting dialysis due to AMS and brown emesis. Had witnessed tonic clonic seizure requiring ativan shortly after getting a CT head. He was loaded with 1500 mg Keppra, intubated for airway protection, and started on propofol for sedation. Upon physical exam, patient remained unresponsive to verbal or tactile stimuli and had upward left sided gaze with sluggish pupils. Concern for status epilepticus. Differential diagnosis includes polysubstance, sepsis, intracranial abnormalities, and PRES.    - Spot EEG without evidence of current seizure. However patient already received keppra, ativan, and sedated on propofol. 24 hour EEG with L  sided structural lesion and underlying epileptiform potential. Initiated 1500 mg keppra on 01/22, per Neurology recs. Continue Keppra 500 mg BID.   - Substance induced: UDS negative, alcohol level wnl  - Sepsis: Initial lactate 2.4, likely due to seizure event; repeat was 1.9. Patient received 500 ml saline in ED. Initial blood cultures, sputum culture+gram stain neg  Lumbar puncture done, CSF not convincing for meningitis, so ampicillin, vancomycin, rocephin d/c'd on 01/22. However, pt was febrile on 11/23 to 101.3. Blood cultures repeated and restarted on Vanc and Zosyn. Repeat cultures NGTD. HSV PCR neg; Acyclovir discontinued. Prt has remained afebrile for > 72 hours. Vanc and Zosyn discontinued 01/26.   - Intracranial: patient with history of ICH with no functional deficits. Possibly multiple foci for seizure overtime. CT without acute changes, MRI brain with chronic changes and hypertensive angiopathy; no acute changes.  - PRES: Patient off cardene drip. MRI reviewed. Will follow blood pressure as patient is normally hypotensive.   - Neurology would like to repeat EEG to ensure that pt is not having any seizures. Finishing 15:30 on 01/27. Will f/u EEG results and Neurology recommendations.       Cardiac/Vascular  Renovascular hypertension  Patient on coreg at nursing home; has been compliant as given by nursing home. Patient with hypertensive emergency on admit and started Cardene drip to titrate to BP of 160-180. Off Cardene drip since 01/20. Goal SBP < 160 per Neurology. Pt started on home Carvedilol on 01/24; BP well controlled.     -- Carvedilol 3.125 mg BID    Chronic diastolic heart failure  Last echo done at [facility] 8/2/19, EF>55%, bi-atrial enlargement      Renal/  ESRD on hemodialysis  Patient normally MWF dialysis. Last full dialysis session prior to admission was Friday 01/17/20.   Nephrology following for HD management. Will monitor for hypotension - last dialysis 01/27.      Endocrine  Severe  malnutrition  Nutrition consulted  Novasource @ 30 mL/hr to provide 1440 kcals, 65 g of protein, 516 mL fluid.     GI  Gastrointestinal hemorrhage with hematemesis  GERD with esophagitis    Patient with reports of coffee ground emesis prior to transport to ED. In ED, dark brown contents suctioned from stomach. Hemoglobin remains stable at 15 and patient is hypertensive. Patient is well known to GI. His most recent scope was in November that just showed esophagitis similar to his previous EGD. GI consulted and appreciate recommendations. Currently on 40 mg BID.      - H/H stable, will space CBC to daily  - PPI d/c'd with no further stigmata of bleeding. Famotidine ppx started 01/27       Critical Care Daily Checklist:    A: Awake: RASS Goal/Actual Goal: RASS Goal: 0-->alert and calm  Actual: Maddox Agitation Sedation Scale (RASS): Drowsy   B: Spontaneous Breathing Trial Performed?     C: SAT & SBT Coordinated?  Not yet                      D: Delirium: CAM-ICU Overall CAM-ICU: Positive   E: Early Mobility Performed? No   F: Feeding Goal: Goals: Meet % EEN, EPN  Status: Nutrition Goal Status: goal met   Current Diet Order   No orders of the defined types were placed in this encounter.      AS: Analgesia/Sedation Off all sedation   T: Thromboembolic Prophylaxis Hep   H: HOB > 300 Yes   U: Stress Ulcer Prophylaxis (if needed) famotidine   G: Glucose Control managing   B: Bowel Function Stool Occurrence: 0   I: Indwelling Catheter (Lines & Arcos) Necessity Arcos  PIV x3  NGT  ETT   D: De-escalation of Antimicrobials/Pharmacotherapies Broad spec abx discontinued    Plan for the day/ETD F/u EEG    Code Status:  Family/Goals of Care: Full Code         Critical secondary to Patient has a condition that poses threat to life and bodily function: Unsure etiology of encephalopathy - possible non-convulsive status vs AED induced encephalopathy      Critical care was time spent personally by me on the following activities:  development of treatment plan with patient or surrogate and bedside caregivers, discussions with consultants, evaluation of patient's response to treatment, examination of patient, ordering and performing treatments and interventions, ordering and review of laboratory studies, ordering and review of radiographic studies, pulse oximetry, re-evaluation of patient's condition. This critical care time did not overlap with that of any other provider or involve time for any procedures.     Yimi Taylor MD  Critical Care Medicine  [facility]

## 2020-01-27 NOTE — PROGRESS NOTES
Hd completed. Net  uf 1500 ml removed. Blood returned and  Needles pulled.  Post B/p 125/74 pulse 90. Patient remains withdrawn. Post bleeding time 8 minutes.

## 2020-01-27 NOTE — SUBJECTIVE & OBJECTIVE
Subjective:     Interval History:   Remains intubated, off sedation >24 hr  Has been on extended EEG--no clinical events concerning for seizure. EEG reviewed by Dr. Newell today, which showed  diffuse cortical dysfunction c/w moderate encephalopathy. No prominent focal findings, no epileptiform discharges, and no electrographic seizures.    Current Neurological Medications:   Keppra 500 mg BID    Current Facility-Administered Medications   Medication Dose Route Frequency Provider Last Rate Last Dose    0.9%  NaCl infusion   Intravenous Once Glenis Benavidez DNP, [place]        0.9%  NaCl infusion   Intravenous Once Glenis Benavidez DNP, FNP-C        acetaminophen tablet 650 mg  650 mg Per NG tube Q6H PRN Yomaira BO Arsenio, NP   650 mg at 01/22/20 2010    carvedilol tablet 3.125 mg  3.125 mg Per OG tube BID Ricky Morgan MD   Stopped at 01/27/20 0800    chlorhexidine 0.12 % solution 15 mL  15 mL Mouth/Throat BID Bharat Ramirez NP   15 mL at 01/27/20 0924    famotidine 40 mg/5 mL (8 mg/mL) suspension 20 mg  20 mg Oral Daily Yimi Taylor MD   20 mg at 01/27/20 1209    glycerin 99.5% topical solution 100 mL  100 mL Rectal ED 1 Time Yimi Taylor MD        And    magnesium citrate solution 296 mL  296 mL Rectal ED 1 Time Yimi Taylor MD        And    sodium chloride 0.9% bolus 500 mL  500 mL Rectal ED 1 Time Yimi Taylor MD        heparin (porcine) injection 5,000 Units  5,000 Units Subcutaneous Q8H Danielle Arnold MD   5,000 Units at 01/27/20 1500    levETIRAcetam in NaCl (iso-os) IVPB 500 mg  500 mg Intravenous Q12H Dakota Alfredo  mL/hr at 01/27/20 0814 500 mg at 01/27/20 0814    multivitamin liquid no.118 per dose 10 mL  10 mL Oral Daily Danielle Arnold MD   10 mL at 01/27/20 0924    sevelamer carbonate pwpk 1.6 g  1.6 g Per OG tube TID Bryan Merrill MD   1.6 g at 01/27/20 1545    sodium chloride 0.9% flush 10 mL  10 mL Intravenous PRN Colt Gonzales MD        thiamine  (B-1) 100 mg in dextrose 5 % 50 mL IVPB  100 mg Intravenous Daily Danielle Arnold MD 12.5 mL/hr at 01/27/20 1300 100 mg at 01/27/20 1300     Review of Systems   Unable to perform ROS: Intubated     Objective:     Vital Signs (Most Recent):  Temp: 98 °F (36.7 °C) (01/27/20 1505)  Pulse: 85 (01/27/20 1704)  Resp: 17 (01/27/20 1704)  BP: (!) 138/53 (01/27/20 1704)  SpO2: 98 % (01/27/20 1704) Vital Signs (24h Range):  Temp:  [97.8 °F (36.6 °C)-98.8 °F (37.1 °C)] 98 °F (36.7 °C)  Pulse:  [75-99] 85  Resp:  [12-28] 17  SpO2:  [91 %-100 %] 98 %  BP: (105-156)/(41-70) 138/53     Weight: 57 kg (125 lb 10.6 oz)  Body mass index is 20.28 kg/m².    Physical Exam    NEUROLOGICAL EXAMINATION:     MENTAL STATUS        Intubated, off sedation >24 hr   Eyes slightly open  Does not track room or attend to voice  Not following commands      CRANIAL NERVES     CN III, IV, VI   Nystagmus: none        Pupils reactive  Corneal, cough and gag intact     MOTOR EXAM   Muscle bulk: normal       Not following commands  No abnormal movements noted      GAIT AND COORDINATION     Tremor   Resting tremor: absent    Significant Labs:   Hemoglobin A1c: No results for input(s): HGBA1C in the last 720 hours.  Blood Culture: No results for input(s): LABBLOO in the last 48 hours.  CBC:   Recent Labs   Lab 01/26/20  0330 01/26/20  1657 01/27/20 0312   WBC 7.16 5.31 5.92   HGB 11.2* 10.0* 11.2*   HCT 36.3* 33.2* 36.0*    210 253     CMP:   Recent Labs   Lab 01/26/20  0330 01/27/20 0312   * 191*    141   K 4.6 4.7    101   CO2 22* 20*   BUN 70* 95*   CREATININE 8.8* 10.1*   CALCIUM 9.7 10.0   MG 2.6 2.7*   PROT 8.3 7.9   ALBUMIN 2.5* 2.3*   BILITOT 0.5 0.4   ALKPHOS 181* 175*   AST 53* 47*   ALT 38 41   ANIONGAP 17* 20*   EGFRNONAA 6.1* 5.2*     Inflammatory Markers: No results for input(s): SEDRATE, CRP, PROCAL in the last 48 hours.  Urine Culture: No results for input(s): LABURIN in the last 48 hours.  Urine Studies: No  results for input(s): COLORU, APPEARANCEUA, PHUR, SPECGRAV, PROTEINUA, GLUCUA, KETONESU, BILIRUBINUA, OCCULTUA, NITRITE, UROBILINOGEN, LEUKOCYTESUR, RBCUA, WBCUA, BACTERIA, SQUAMEPITHEL, HYALINECASTS in the last 48 hours.    Invalid input(s): WRIGHTSUR  All pertinent lab results from the past 24 hours have been reviewed.    EEG (1/26-1/27/20) 15 hr 25 min  abnormal continuous EEG monitoring study because because of generalized background slowing consistent with diffuse cortical dysfunction and a moderate encephalopathy.  This finding is nonspecific with regards to etiology but can be seen in the setting of toxic/metabolic derangements, infection, and as a medication effect.  There are no pushbutton activations, no prominent focal findings, no epileptiform discharges, and no electrographic seizures.    Significant Imaging: I have reviewed and interpreted all pertinent imaging results/findings within the past 24 hours.     MRI Brain WO contrast (1/20/20):  No acute intracranial abnormality identified. Remote microhemorrhages throughout the brain in a predominantly central distribution suggesting hypertensive microangiopathy.  Chronic microvascular ischemic change

## 2020-01-27 NOTE — PROCEDURES
[facility] EEG/VIDEO MONITORING REPORT  Vaughn Retana  [id number]  1965    DATE OF SERVICE:  01/26/2020  DATE OF ADMISSION: 1/20/2020  8:07 AM    ADMITTING/REQUESTING PROVIDER: Venu Curiel MD    REASON FOR CONSULT:  54-year-old man with complicated medical comorbidities with episode of shaking and decreased responsiveness.  Evaluate for evidence of epileptiform activity.    METHODOLOGY   Electroencephalographic (EEG) recording is with electrodes placed according to the International 10-20 placement system.  Thirty two (32) channels of digital signal (sampling rate of 512/sec) including T1 and T2 was simultaneously recorded from the scalp and may include  EKG, EMG, and/or eye monitors.  Recording band pass was 0.1 to 512 hz.  Digital video recording of the patient is simultaneously recorded with the EEG.  The patient is instructed report clinical symptoms which may occur during the recording session.  EEG and video recording is stored and archived in digital format.  Activation procedures which include photic stimulation, hyperventilation and instructing patients to perform simple task are done in selected patients.   The EEG is displayed on a monitor screen and can be reviewed using different montages.  Computer assisted analysis is employed to detect spike and electrographic seizure activity.   The entire record is submitted for computer analysis.  The entire recording is visually reviewed and the times identified by computer analysis as being spikes or seizures are reviewed again.  Compresses spectral analysis (CSA) is also performed on the activity recorded from each individual channel.  This is displayed as a power display of frequencies from 0 to 30 Hz over time.   The CSA is reviewed looking for asymmetries in power between homologous areas of the scalp and then compared with the original EEG recording.     Ben[employer]itter software is also utilized in the review of this study.  This software suite analyzes  the EEG recording in multiple domains.  Coherence and rhythmicity is computed to identify EEG sections which may contain organized seizures.  Each channel undergoes analysis to detect presence of spike and sharp waves which have special and morphological characteristic of epileptic activity.  The routine EEG recording is converted from spacial into frequency domain.  This is then displayed comparing homologous areas to identify areas of significant asymmetry.  Algorithm to identify non-cortically generated artifact is used to separate eye movement, EMG and other artifact from the EEG.      RECORDING TIMES  Start on 01/26/2020 at 15:24 p.m.  Stop on 01/27/2020 at 07:00 a.m.  A total of 15 hr and 25 min of EEG recording is obtained.    EEG FINDINGS  Background activity:   The background is continuous relatively symmetric. There is predominantly alpha/theta activity with plenty of admixed higher frequencies.  There are frequent bursts of frontally predominant intermittent rhythmic delta activity (FIRDA).     There are no pushbutton activations.    Sleep:  The patient transitions from wakefulness to sleep with the appearance of sleep spindles, K complexes, and vertex waves.    Activation procedures:   Hyperventilation is not performed  Photic stimulation is not performed    Cardiac Monitor:   Heart rate appears generally regular on a single lead EKG.    Impression:   This is an abnormal continuous EEG monitoring study because because of generalized background slowing consistent with diffuse cortical dysfunction and a moderate encephalopathy.  This finding is nonspecific with regards to etiology but can be seen in the setting of toxic/metabolic derangements, infection, and as a medication effect.  There are no pushbutton activations, no prominent focal findings, no epileptiform discharges, and no electrographic seizures.    Antoinette Newell MD PhD  Neurology-Epilepsy  [facility]  [facility]

## 2020-01-27 NOTE — ASSESSMENT & PLAN NOTE
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  He has been persistently altered with minimal improvement, but has occasional fluctuations in exam.    1/27-hooked up to EEG overnight, no clinical events concerning for seizure  EEG reviewed with epilepsy team and no focal findings, just diffuse slowing   Remains uremic and underlying dialysis    Recommendations  --will discuss discontinuation of keppra, continue 500 mg BID for now  --f/u pending ammonia and B1  --continue correction of metabolic derangements per primary

## 2020-01-28 PROBLEM — L89.90 PRESSURE INJURY DUE TO MEDICAL DEVICE: Status: ACTIVE | Noted: 2020-01-01

## 2020-01-28 PROBLEM — T85.898A PRESSURE INJURY DUE TO MEDICAL DEVICE: Status: ACTIVE | Noted: 2020-01-01

## 2020-01-28 NOTE — ASSESSMENT & PLAN NOTE
Altered mental status    Patient initially presented to ED prior to getting dialysis due to AMS and brown emesis. Had witnessed tonic clonic seizure requiring ativan shortly after getting a CT head. He was loaded with 1500 mg Keppra, intubated for airway protection, and started on propofol for sedation. Upon physical exam, patient remained unresponsive to verbal or tactile stimuli and had upward left sided gaze with sluggish pupils. Concern for status epilepticus. Differential diagnosis includes polysubstance, sepsis, intracranial abnormalities, and PRES.     - Spot EEG without evidence of current seizure. However patient already received keppra, ativan, and sedated on propofol. 24 hour EEG with L sided structural lesion and underlying epileptiform potential. Initiated 1500 mg keppra on 01/22, per Neurology recs.  - Substance induced: UDS negative, alcohol level wnl  - Sepsis: Initial lactate 2.4, likely due to seizure event; repeat was 1.9. Patient received 500 ml saline in ED. Initial blood cultures, sputum culture+gram stain neg  Lumbar puncture done, CSF not convincing for meningitis, so ampicillin, vancomycin, rocephin d/c'd on 01/22. However, pt was febrile on 11/23 to 101.3. Blood cultures repeated and restarted on Vanc and Zosyn. Repeat cultures NGTD. HSV PCR neg; Acyclovir discontinued. Prt has remained afebrile for > 72 hours. Vanc and Zosyn discontinued 01/26.   - Intracranial: patient with history of ICH with no functional deficits. Possibly multiple foci for seizure overtime. CT without acute changes, MRI brain with chronic changes and hypertensive angiopathy; no acute changes.  - PRES: Patient off cardene drip. MRI reviewed. Will follow blood pressure as patient is normally hypotensive.   - Neurology would like to repeat EEG to ensure that pt is not having any seizures. Finishing 15:30 on 01/27. Diffuse slowing, though no focal activity seen on EEG. 01/28 patient's mental status is continuing to  improve day to day, now following commands. Passed SBT today, though plan on extubation for 01/29 due to weakness. Continue keppra 500 bid.

## 2020-01-28 NOTE — SUBJECTIVE & OBJECTIVE
Interval History/Significant Events:   NAEON. Off all sedation other than AED. Intubated, PEEP 5 and FiO2 21%. Some neurologic improvement - opening eyes spontaneously and occasionally tracking.        Review of Systems   Unable to perform ROS: Intubated     Objective:     Vital Signs (Most Recent):  Temp: 99.7 °F (37.6 °C) (01/28/20 0700)  Pulse: 86 (01/28/20 0735)  Resp: 14 (01/28/20 0735)  BP: (!) 147/94 (01/28/20 0700)  SpO2: 99 % (01/28/20 0735) Vital Signs (24h Range):  Temp:  [97.8 °F (36.6 °C)-99.7 °F (37.6 °C)] 99.7 °F (37.6 °C)  Pulse:  [] 86  Resp:  [12-28] 14  SpO2:  [91 %-100 %] 99 %  BP: (105-165)/(41-95) 147/94   Weight: 57 kg (125 lb 10.6 oz)  Body mass index is 20.28 kg/m².      Intake/Output Summary (Last 24 hours) at 1/28/2020 0814  Last data filed at 1/28/2020 0600  Gross per 24 hour   Intake 1130 ml   Output 2000 ml   Net -870 ml       Physical Exam   Constitutional: No distress.   HENT:   Head: Normocephalic and atraumatic.   Mouth/Throat: No oropharyngeal exudate.   Eyes: Pupils are equal, round, and reactive to light.   Neck: No JVD present.   Cardiovascular: Normal rate, regular rhythm and normal heart sounds.   No murmur heard.  Pulmonary/Chest:   Intubated, mechanical breath sounds, no significant rales   Abdominal: Soft. He exhibits no distension. There is no tenderness.   Musculoskeletal:   Left below knee amputation   Neurological:   PERRL. Opens eyes to voice. Intermittent tracking with eyes. No withdrawal or extension to pain in b/l upper and lower extremities. Cough, gag and corneal reflexes intact. Not following commands.   Skin: Skin is warm and dry. Capillary refill takes less than 2 seconds.   Vitals reviewed.      Vents:  Vent Mode: A/C (01/28/20 0735)  Ventilator Initiated: Yes (01/20/20 1327)  Set Rate: 12 bmp (01/28/20 0735)  Vt Set: 340 mL (01/24/20 1403)  Pressure Support: 10 cmH20 (01/26/20 0159)  PEEP/CPAP: 5 cmH20 (01/28/20 0735)  Oxygen Concentration (%): 21  (01/28/20 0735)  Peak Airway Pressure: 16 cmH2O (01/28/20 0735)  Plateau Pressure: 11 cmH20 (01/28/20 0735)  Total Ve: 5.42 mL (01/28/20 0735)  F/VT Ratio<105 (RSBI): (!) 32.11 (01/28/20 0735)  Lines/Drains/Airways     Drain                 Hemodialysis AV Fistula Right upper arm -- days         Hemodialysis AV Fistula Right upper arm -- days         NG/OG Tube 01/20/20 1932 Ewing sump Right nostril 7 days          Airway                 Airway - Non-Surgical 01/20/20 1325 Endotracheal Tube-Hi/Lo 7 days          Peripheral Intravenous Line                 Peripheral IV - Single Lumen 01/22/20 0012 20 G Anterior;Left;Proximal Forearm 6 days              Significant Labs:    CBC/Anemia Profile:  Recent Labs   Lab 01/26/20  1657 01/27/20  0312 01/28/20  0250   WBC 5.31 5.92 10.31   HGB 10.0* 11.2* 12.0*   HCT 33.2* 36.0* 39.6*    253 268   MCV 93 91 92   RDW 16.2* 15.9* 15.9*        Chemistries:  Recent Labs   Lab 01/27/20  0312 01/28/20  0250    138   K 4.7 4.4    101   CO2 20* 21*   BUN 95* 50*   CREATININE 10.1* 6.2*   CALCIUM 10.0 9.8   ALBUMIN 2.3* 2.5*   PROT 7.9 8.6*   BILITOT 0.4 0.4   ALKPHOS 175* 198*   ALT 41 47*   AST 47* 50*   MG 2.7* 2.7*   PHOS 5.1* 4.4       All pertinent labs within the past 24 hours have been reviewed.    Significant Imaging:  I have reviewed all pertinent imaging results/findings within the past 24 hours.

## 2020-01-28 NOTE — PHYSICIAN QUERY
PT Name: Vaughn Retana  MR #: [id number]     Physician Query Form - Diagnosis Clarification      CDS: Jessica Shelby RN  Contact information: [email or web address]    This form is a permanent document in the medical record.     Query Date: January 28, 2020  By submitting this query, we are merely seeking further clarification of documentation.  Please utilize your independent clinical judgment when addressing the question(s) below.     The medical record contains the following:      Findings Supporting Clinical Information Location in Medical Record   PRES     PRES: Patient off cardene drip. MRI reviewed. Will follow blood pressure as patient is normally hypotensive       1. Seizure - this is the 1st time patient is noted to have a seizure.  Will continue work up.  EEG shows epileptiform discharges (Cont to treat), MRI -neg, LP - bland. Cont Acyclovir  2.  Acute metabolic encephalopathy  - as above      Impression    1. No acute intracranial abnormality identified.  2. Remote microhemorrhages throughout the brain in a predominantly central distribution suggesting hypertensive microangiopathy.  3. Chronic microvascular ischemic change.     Critical Care Med 1/21        Critical Care Med 1/21  Dr Burton's attestation        MRI 1/20     Please clarify if the PRES diagnosis has been:    [  ] Ruled In   [  ] Ruled In, Now Resolved   [x  ] Ruled Out   [  ] Other/Clarification of findings (please specify):     [  ] Clinically undetermined     Please document in your progress notes daily for the duration of treatment, until resolved, and include in your discharge summary.

## 2020-01-28 NOTE — PLAN OF CARE
VS and assessment per flow sheet, see below for updates:    Pulmonary: Pt intubated, ETT 27@lip. FiO2 21%, PEEP 5, R 12. Pt has a large amount of thick and thin white secretions both orally and tracheally. O2 Sat %. Pt does frequently cough and requires very frequent suctioning. Lung sound diminished and equal bilaterally.     Cardiovascular: Pt is NSR/ST 90s-100s. SBP 130s-150s. BUE pulses palpable, RLE pulse 1+ and palpable, pt has L BKA and popliteal pulse 1+.    Neurological: Pt opens eyes spontaneously and nods appropriately. Pt is able to follow simple commands and shake his head no to pain. Pupils 2, equal reactive and sluggish. Pt FLORES spontaneously and has some effort against gravity but is very weak and drifts off to sleep.     Gastrointestinal: Pt received enema on day shift, large liquid BM noted. Pt is having frequent BM's thickening in consistency. Pt on tube feeds, @ goal rate of 30cc/hr. Minimal residuals. Bowel sounds audible and active. NGT in place.     Genitourinary: Pt anuric. Pt received HD today through R arm fistula, thrill and bruit present.     Endocrine: n/a.    Skin/Bath: Pt skin intact.   Date of last CHG bath given: 1/27.    Infusions: none    CMICU DAILY GOALS       A: Awake    RASS: Goal - RASS Goal: 0-->alert and calm  Actual - RASS ([place] Agitation-Sedation Scale): -1-->drowsy   Restraint necessity: Clinical Justification: Treatment Interference  B: Breath   SBT: Not attempted   C: Coordinate A & B, analgesics/sedatives   Pain: managed    SAT: NA  D: Delirium   CAM-ICU: Overall CAM-ICU: Positive  E: Early Mobility   MOVE Screen: Fail   Activity: Activity Management: bedrest maintained per order  FAS: Feeding/Nutrition   Diet order: Diet/Nutrition Received: NPO, tube feeding,   Fluid restriction:    T: Thrombus   DVT prophylaxis: VTE Required Core Measure: Pharmacological prophylaxis initiated/maintained  H: HOB Elevation   Head of Bed (HOB): HOB at 30-45 degrees  U: Ulcer  Prophylaxis   GI: yes  G: Glucose control   managed Glycemic Management: blood glucose monitoring  S: Skin   Bundle compliance: yes   Bathing/Skin Care: incontinence care, linen changed Date: 1/27  B: Bowel Function   diarrhea   I: Indwelling Catheters   Arcos necessity: [REMOVED]      Urethral Catheter 01/20/20 1950 Straight-tip 16 Fr.-Reason for Continuing Urinary Catheterization: Critically ill in ICU requiring intensive monitoring   CVC necessity: No   IPAD offered: Not appropriate  D: De-escalation Antibx   Yes  Plan for the day   Plan for the day is to continue to closely monitor neuro assessment/ assess for and treat pain accordingly, and potentially perform SBT.   Family/Goals of care/Code Status   Code Status: Full Code    Patient progressing towards goals as tolerated, plan of care communicated and reviewed with Vaughn Retana and family, all concerns addressed, will continue to monitor.     Yvette Oh RN

## 2020-01-28 NOTE — PROCEDURES
EEG Report  ICU EEG/VIDEO MONITORING REPORT    DATE OF SERVICE:  01/27/2020  EEG NUMBER: [id number]  REQUESTED BY:  Dr. katz  LOCATION OF SERVICE:  [address] [place] [address]   Electroencephalographic (EEG) recording is with electrodes placed according to the International 10-20 placement system.  Thirty two (32) channels of digital signal are simultaneously recorded from the scalp and may include EKG, EMG, and/or eye monitors.   Recording band pass was 0.1 to 512 hz.  Digital video recording of the patient is simultaneously recorded with the EEG.  The nursing staff report clinical symptoms and may press an event button when the patient has symptoms of clinical interest to the treating physicians.  EEG and video recording is stored and archived in digital format.  The entire recording is visually reviewed and the times identified by computer analysis as being spikes or seizures are reviewed again.  Activation procedures which include photic stimulation, hyperventilation and instructing patients to perform simple task are done in selected patients.   Compresses spectral analysis (CSA) is also performed on the activity recorded from each individual channel.  This is displayed as a power display of frequencies from 0 to 30 Hz over time.   The CSA analysis is done and displayed continuously.  This is reviewed for asymmetries in power between homologous areas of the scalp and for presence of changes in power which canbe seen when seizures occur.  Sections of suspected abnormalities on the CSA is then compared with the original EEG recording.     [employer]links software was also utilized in the review of this study.  This software suite analyzes the EEG recording in multiple domains.  Coherence and rhythmicity is computed to identify EEG sections which may contain organized seizures.  Each channel undergoes analysis to detect presence of spike and sharp waves which have special and morphological characteristic of epileptic  activity.  The routine EEG recording is converted from spacial into frequency domain.  This is then displayed comparing homologous areas to identify areas of significant asymmetry.  Algorithm to identify non-cortically generated artifact is used to separate eye movement, EMG and other artifact from the EEG.      Recording Times  Start on 01/27/2020 at 7:03 a.m.  Stop on 01/28/2020 at 7:00 a.m.  A total of 23 hr and 57 minof EEG was recorded.    EEG FINDINGS  Recording was obtained the at the patient's bedside in the ICU.  Patient was unresponsive, intubated on a respirator.  Background was a diffuse mixture of mid-range irregular theta frequencies.  On the left side there was some slower theta and intermittently 1-2 hertz delta was seen bilaterally for 2-3 second.  The runs of delta intermittently became longer and higher amplitude and more rhythmic but remained relatively symmetrical over the 2 hemispheres.    Day/night cycle  Not observed  Activation Procedures  Not performed  Cardiac Monitor:   Single lead EKG was obtained normal heart rate noted    IMPRESSION:  Markedly abnormal EEG with diffuse slowing over both hemispheres.  There is mild asymmetry with less of a delta noted on the right side but no other asymmetries noted and no epileptic activity was recorded the.    CLINICAL CORRELATION:  The patient is a 54-year-old male with end-stage renal disease which correlates with the diffuse slowing indicative of an encephalopathic process.  Also a history of multiple basal gangliar bleeds in the right with lower amplitude activity noted on the right side.  He also has a history of seizures there is no evidence for an epileptic process.

## 2020-01-28 NOTE — SUBJECTIVE & OBJECTIVE
Subjective:     Interval History:   Mental status improved overnight, more awake and alert today  Passed spontaneous breathing trial, primary team working towards extubation   Tracking room and following simple commands  vEEG reviewed by Dr. Newell--some occasional discharges in L frontotemporal region with slowing    Current Neurological Medications:   Keppra 500 mg BID    Current Facility-Administered Medications   Medication Dose Route Frequency Provider Last Rate Last Dose    0.9%  NaCl infusion   Intravenous Once Gelnis Benavidez DNP, FNP-[place]        0.9%  NaCl infusion   Intravenous Once Glenis Benavidez DNP, FNP-C        acetaminophen tablet 650 mg  650 mg Per NG tube Q6H PRN Yomaira Cesar, NP   650 mg at 01/22/20 2010    carvedilol tablet 3.125 mg  3.125 mg Per OG tube BID Ricky Morgan MD   3.125 mg at 01/28/20 0803    chlorhexidine 0.12 % solution 15 mL  15 mL Mouth/Throat BID Bharat Ramirez NP   15 mL at 01/28/20 0809    famotidine 40 mg/5 mL (8 mg/mL) suspension 20 mg  20 mg Oral Daily Yimi Taylor MD   20 mg at 01/28/20 0803    heparin (porcine) injection 5,000 Units  5,000 Units Subcutaneous Q8H Danielle Arnold MD   5,000 Units at 01/28/20 0613    levETIRAcetam in NaCl (iso-os) IVPB 500 mg  500 mg Intravenous Q12H Dakota Alfredo  mL/hr at 01/28/20 0803 500 mg at 01/28/20 0803    multivitamin liquid no.118 per dose 10 mL  10 mL Oral Daily Danielle Arnold MD   10 mL at 01/28/20 0803    sevelamer carbonate pwpk 1.6 g  1.6 g Per OG tube TID Bryan Merrill MD   1.6 g at 01/28/20 0803    sodium chloride 0.9% flush 10 mL  10 mL Intravenous PRN Colt Gonzales MD        thiamine (B-1) 100 mg in dextrose 5 % 50 mL IVPB  100 mg Intravenous Daily Danielle Arnold MD 12.5 mL/hr at 01/28/20 0853 100 mg at 01/28/20 0853     Review of Systems   Unable to perform ROS: Intubated     Objective:     Vital Signs (Most Recent):  Temp: 99.6 °F (37.6 °C) (01/28/20 1100)  Pulse: 86  (01/28/20 1309)  Resp: (!) 28 (01/28/20 1309)  BP: (!) 115/43 (01/28/20 1100)  SpO2: 100 % (01/28/20 1309) Vital Signs (24h Range):  Temp:  [98 °F (36.7 °C)-99.7 °F (37.6 °C)] 99.6 °F (37.6 °C)  Pulse:  [] 86  Resp:  [12-28] 28  SpO2:  [92 %-100 %] 100 %  BP: (105-165)/(43-95) 115/43     Weight: 57 kg (125 lb 10.6 oz)  Body mass index is 20.28 kg/m².    Physical Exam   Constitutional: No distress.   Pulmonary/Chest:   Intubated      Skin: He is not diaphoretic.       NEUROLOGICAL EXAMINATION:     MENTAL STATUS   Level of consciousness: alert       Eyes open  Tracking room  Attends to voice  Follows simple commands (squeeze hand and wiggles toes on R foot)      CRANIAL NERVES     CN III, IV, VI   Nystagmus: none   Ophthalmoparesis: none    CN VIII   Hearing: intact       Tracks room  Attends to voice   Face symmetric at rest      MOTOR EXAM   Muscle bulk: normal  Overall muscle tone: normal       Squeezes hand and wiggles toes on R foot     GAIT AND COORDINATION     Tremor   Resting tremor: absent    Significant Labs:   CBC:   Recent Labs   Lab 01/26/20  1657 01/27/20  0312 01/28/20  0250   WBC 5.31 5.92 10.31   HGB 10.0* 11.2* 12.0*   HCT 33.2* 36.0* 39.6*    253 268     CMP:   Recent Labs   Lab 01/27/20  0312 01/28/20  0250   * 169*    138   K 4.7 4.4    101   CO2 20* 21*   BUN 95* 50*   CREATININE 10.1* 6.2*   CALCIUM 10.0 9.8   MG 2.7* 2.7*   PROT 7.9 8.6*   ALBUMIN 2.3* 2.5*   BILITOT 0.4 0.4   ALKPHOS 175* 198*   AST 47* 50*   ALT 41 47*   ANIONGAP 20* 16   EGFRNONAA 5.2* 9.3*     Inflammatory Markers: No results for input(s): SEDRATE, CRP, PROCAL in the last 48 hours.  Respiratory Culture: No results for input(s): GSRESP, RESPIRATORYC in the last 48 hours.  Urine Culture: No results for input(s): LABURIN in the last 48 hours.  Urine Studies: No results for input(s): COLORU, APPEARANCEUA, PHUR, SPECGRAV, PROTEINUA, GLUCUA, KETONESU, BILIRUBINUA, OCCULTUA, NITRITE, UROBILINOGEN,  LEUKOCYTESUR, RBCUA, WBCUA, BACTERIA, SQUAMEPITHEL, HYALINECASTS in the last 48 hours.    Invalid input(s): TALITA  All pertinent lab results from the past 24 hours have been reviewed.    vEEG (1/27-1/28/20): 23 hr 54 min  This is an abnormal continuous EEG monitoring study because of multifocal slowing with occasional epileptiform discharges in the left frontotemporal region consistent with multiple areas of focal cortical dysfunction and a potential seizure focus.  There are bursts of frontally predominant generalized slowing consistent with subcortical or deep midline dysfunction.  There are no electrographic seizures during this recording session.  Compared to the previous day's recording, focal slowing with epileptiform potential is more apparent on this recording session.    Significant Imaging: I have reviewed and interpreted all pertinent imaging results/findings within the past 24 hours.     MRI Brain WO contrast (1/20/20):  1. No acute intracranial abnormality identified.  2. Remote microhemorrhages throughout the brain in a predominantly central distribution suggesting hypertensive microangiopathy.  3. Chronic microvascular ischemic change.

## 2020-01-28 NOTE — NURSING
Pt has been following commands and nodding appropriately throughout the night consistently (no waxing and waning). RN noticed patient's eyes open and gaze direct upward and then deviate to the right. Pt was then not following commands or tracking appropriately (delayed response). WCTM.

## 2020-01-28 NOTE — ASSESSMENT & PLAN NOTE
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      1/28-clinically improved overnight--more interactive and intermittently following simple commands today  EEG overnight with multifocal slowing and  occasional epileptiform discharges in the left frontotemporal region   Passed SBT today    Recommendations  --increase Keppra to 1 g BID given EEG findings and cerebral microbleeds  Unhooked from EEG, please call back if clinical event concerning for seizure occurs   --continue correction of metabolic derangements per primary   BUN improved today 95>>50

## 2020-01-28 NOTE — PROGRESS NOTES
[facility]  Nephrology  Progress Note    Patient Name: Vaughn Retana  MRN: [id number]  Admission Date: 1/20/2020  Hospital Length of Stay: 8 days  Attending Provider: Ricky Morgan MD   Primary Care Physician: Cori Randall MD  Principal Problem:Seizure    Subjective:     Interval History: HD on yesterday. Net UF 1.5 L. Patient more alert today, following some commands.     Review of patient's allergies indicates:   Allergen Reactions    Fosrenol [lanthanum] Nausea And Vomiting     Nausea and vomiting     Current Facility-Administered Medications   Medication Frequency    0.9%  NaCl infusion Once    0.9%  NaCl infusion Once    acetaminophen tablet 650 mg Q6H PRN    carvedilol tablet 3.125 mg BID    chlorhexidine 0.12 % solution 15 mL BID    famotidine 40 mg/5 mL (8 mg/mL) suspension 20 mg Daily    heparin (porcine) injection 5,000 Units Q8H    levETIRAcetam in NaCl (iso-os) IVPB 500 mg Q12H    multivitamin liquid no.118 per dose 10 mL Daily    sevelamer carbonate pwpk 1.6 g TID    sodium chloride 0.9% flush 10 mL PRN    thiamine (B-1) 100 mg in dextrose 5 % 50 mL IVPB Daily       Objective:     Vital Signs (Most Recent):  Temp: 99.7 °F (37.6 °C) (01/28/20 0700)  Pulse: 84 (01/28/20 0913)  Resp: 17 (01/28/20 0913)  BP: (!) 118/58 (01/28/20 0900)  SpO2: 100 % (01/28/20 0913)  O2 Device (Oxygen Therapy): ventilator (01/28/20 0913) Vital Signs (24h Range):  Temp:  [97.8 °F (36.6 °C)-99.7 °F (37.6 °C)] 99.7 °F (37.6 °C)  Pulse:  [] 84  Resp:  [12-28] 17  SpO2:  [91 %-100 %] 100 %  BP: (105-165)/(41-95) 118/58     Weight: 57 kg (125 lb 10.6 oz) (01/24/20 1100)  Body mass index is 20.28 kg/m².  Body surface area is 1.63 meters squared.    I/O last 3 completed shifts:  In: 1930 [I.V.:280; NG/GT:1300; IV Piggyback:350]  Out: 2000 [Other:2000]    Physical Exam   Constitutional: He appears well-nourished. He appears ill. He is intubated.   HENT:   Head: Normocephalic and atraumatic.    Mouth/Throat: No oropharyngeal exudate.   Eyes: Right eye exhibits no discharge. Left eye exhibits no discharge. No scleral icterus.   Neck: Normal range of motion. Neck supple. No JVD present.   Cardiovascular: Regular rhythm and normal heart sounds.   No murmur heard.  Pulmonary/Chest: He is intubated. He has no rales.   Abdominal: Soft. Bowel sounds are normal. He exhibits no distension.   Musculoskeletal: Normal range of motion. He exhibits deformity (LBKA). He exhibits no edema.   Left below knee amputation   Neurological: He is alert.   Skin: Skin is warm and dry.   Vitals reviewed.      Significant Labs:  CBC:   Recent Labs   Lab 01/28/20  0250   WBC 10.31   RBC 4.33*   HGB 12.0*   HCT 39.6*      MCV 92   MCH 27.7   MCHC 30.3*     CMP:   Recent Labs   Lab 01/28/20  0250   *   CALCIUM 9.8   ALBUMIN 2.5*   PROT 8.6*      K 4.4   CO2 21*      BUN 50*   CREATININE 6.2*   ALKPHOS 198*   ALT 47*   AST 50*   BILITOT 0.4          Assessment/Plan:     * Seizure  - per primary team     ESRD on hemodialysis  The patient is a 53 yo AAM admitted to MICU with seizure activity after presenting to the ED on 1/20 after being found with alter mental status at his NH facility Monday morning. The patient is currently on continuous EEG monitoring. Last dialyze on MWF.     Nephrology History  iHD Schedule: MWF  Unit/MD: [facility]/ Dr. Lemus  Duration: 3.5 hrs  EDW: ?  Access: [facility]   Resodial Renal Function: Yes (per records)    Plan:   - No need for HD today. Will reassess tomorrow.   - Daily renal function panels   - Renal diet or Novasource TF   - On mechanical ventilation with FiO at 21%  - Continue to monitor intake and output, daily weights   - Avoid nephrotoxic medication and renal dose medications to GFR  - Will discuss with         Thank you for your consult. I will follow-up with patient. Please contact us if you have any additional questions.    Glenis Benavidez DNP,  FNP-C  Nephrology  [facility]

## 2020-01-28 NOTE — PROCEDURES
[facility] EEG/VIDEO MONITORING REPORT  Vaughn Retana  [id number]  1965    DATE OF SERVICE:  01/27/2020-01/28/2020  DATE OF ADMISSION: 1/20/2020  8:07 AM    ADMITTING/REQUESTING PROVIDER: Venu Curiel MD    REASON FOR CONSULT:  54-year-old man with complicated medical comorbidities with episode of shaking and decreased responsiveness.  Evaluate for evidence of epileptiform activity.    METHODOLOGY   Electroencephalographic (EEG) recording is with electrodes placed according to the International 10-20 placement system.  Thirty two (32) channels of digital signal (sampling rate of 512/sec) including T1 and T2 was simultaneously recorded from the scalp and may include  EKG, EMG, and/or eye monitors.  Recording band pass was 0.1 to 512 hz.  Digital video recording of the patient is simultaneously recorded with the EEG.  The patient is instructed report clinical symptoms which may occur during the recording session.  EEG and video recording is stored and archived in digital format.  Activation procedures which include photic stimulation, hyperventilation and instructing patients to perform simple task are done in selected patients.   The EEG is displayed on a monitor screen and can be reviewed using different montages.  Computer assisted analysis is employed to detect spike and electrographic seizure activity.   The entire record is submitted for computer analysis.  The entire recording is visually reviewed and the times identified by computer analysis as being spikes or seizures are reviewed again.  Compresses spectral analysis (CSA) is also performed on the activity recorded from each individual channel.  This is displayed as a power display of frequencies from 0 to 30 Hz over time.   The CSA is reviewed looking for asymmetries in power between homologous areas of the scalp and then compared with the original EEG recording.     "[employer] software is also utilized in the review of this study.  This software suite  analyzes the EEG recording in multiple domains.  Coherence and rhythmicity is computed to identify EEG sections which may contain organized seizures.  Each channel undergoes analysis to detect presence of spike and sharp waves which have special and morphological characteristic of epileptic activity.  The routine EEG recording is converted from spacial into frequency domain.  This is then displayed comparing homologous areas to identify areas of significant asymmetry.  Algorithm to identify non-cortically generated artifact is used to separate eye movement, EMG and other artifact from the EEG.      RECORDING TIMES  Start on 01/27/2020 at 07:00 a.m.  Stop on 01/28/2020 at 07:00 a.m.  A total of 23 hr and 54 min of EEG recording is obtained.    Start on 01/28/2020 at 07:00 a.m.  Stop on 01/28/2020 at 13:14 p.m. -> End of the Recording Session  A total of 6 hr and 9 min of EEG recording is obtained.    EEG FINDINGS  Background activity:   The background is continuous, predominantly alpha/theta activity with plenty of admixed higher frequencies.  There are frequent bursts of polymorphic frontally predominant delta activity.  There are occasional spike and wave epileptiform discharges most prominent over the left frontotemporal region (phase reversing at F3/F7 with a field into the right hemisphere) with focal slowing noted in the same region.  In addition, there is occasional sharply contoured slowing over the right hemisphere as well without well-formed epileptiform discharges.  There is a moderately well-formed 9 hz maximal posterior dominant rhythm.    There are no pushbutton activations.    Sleep:  The patient transitions from wakefulness to sleep with the appearance of sleep spindles, K complexes, and vertex waves.    Activation procedures:   Hyperventilation is not performed  Photic stimulation is not performed    Cardiac Monitor:   Heart rate appears generally regular on a single lead EKG.    Impression:   This  is an abnormal continuous EEG monitoring study because of multifocal slowing consistent with multiple areas of focal cortical dysfunction and occasional epileptiform discharges in the left frontotemporal region consistent with a potential seizure focus in this area.  There are bursts of frontally predominant slowing consistent with subcortical or deep midline dysfunction.  There are no electrographic seizures during this recording session.  Compared to the previous day's recording, the pattern is less encephalopathic and focal slowing with epileptiform potential is more apparent on this recording session.    Antoinette Newell MD PhD  Neurology-Epilepsy  [facility].  [facility]

## 2020-01-28 NOTE — SUBJECTIVE & OBJECTIVE
Interval History: HD on yesterday. Net UF 1.5 L. Patient more alert today, following some commands.     Review of patient's allergies indicates:   Allergen Reactions    Fosrenol [lanthanum] Nausea And Vomiting     Nausea and vomiting     Current Facility-Administered Medications   Medication Frequency    0.9%  NaCl infusion Once    0.9%  NaCl infusion Once    acetaminophen tablet 650 mg Q6H PRN    carvedilol tablet 3.125 mg BID    chlorhexidine 0.12 % solution 15 mL BID    famotidine 40 mg/5 mL (8 mg/mL) suspension 20 mg Daily    heparin (porcine) injection 5,000 Units Q8H    levETIRAcetam in NaCl (iso-os) IVPB 500 mg Q12H    multivitamin liquid no.118 per dose 10 mL Daily    sevelamer carbonate pwpk 1.6 g TID    sodium chloride 0.9% flush 10 mL PRN    thiamine (B-1) 100 mg in dextrose 5 % 50 mL IVPB Daily       Objective:     Vital Signs (Most Recent):  Temp: 99.7 °F (37.6 °C) (01/28/20 0700)  Pulse: 84 (01/28/20 0913)  Resp: 17 (01/28/20 0913)  BP: (!) 118/58 (01/28/20 0900)  SpO2: 100 % (01/28/20 0913)  O2 Device (Oxygen Therapy): ventilator (01/28/20 0913) Vital Signs (24h Range):  Temp:  [97.8 °F (36.6 °C)-99.7 °F (37.6 °C)] 99.7 °F (37.6 °C)  Pulse:  [] 84  Resp:  [12-28] 17  SpO2:  [91 %-100 %] 100 %  BP: (105-165)/(41-95) 118/58     Weight: 57 kg (125 lb 10.6 oz) (01/24/20 1100)  Body mass index is 20.28 kg/m².  Body surface area is 1.63 meters squared.    I/O last 3 completed shifts:  In: 1930 [I.V.:280; NG/GT:1300; IV Piggyback:350]  Out: 2000 [Other:2000]    Physical Exam   Constitutional: He appears well-nourished. He appears ill. He is intubated.   HENT:   Head: Normocephalic and atraumatic.   Mouth/Throat: No oropharyngeal exudate.   Eyes: Right eye exhibits no discharge. Left eye exhibits no discharge. No scleral icterus.   Neck: Normal range of motion. Neck supple. No JVD present.   Cardiovascular: Regular rhythm and normal heart sounds.   No murmur heard.  Pulmonary/Chest: He is  intubated. He has no rales.   Abdominal: Soft. Bowel sounds are normal. He exhibits no distension.   Musculoskeletal: Normal range of motion. He exhibits deformity (LBKA). He exhibits no edema.   Left below knee amputation   Neurological: He is alert.   Skin: Skin is warm and dry.   Vitals reviewed.      Significant Labs:  CBC:   Recent Labs   Lab 01/28/20  0250   WBC 10.31   RBC 4.33*   HGB 12.0*   HCT 39.6*      MCV 92   MCH 27.7   MCHC 30.3*     CMP:   Recent Labs   Lab 01/28/20  0250   *   CALCIUM 9.8   ALBUMIN 2.5*   PROT 8.6*      K 4.4   CO2 21*      BUN 50*   CREATININE 6.2*   ALKPHOS 198*   ALT 47*   AST 50*   BILITOT 0.4

## 2020-01-28 NOTE — PROGRESS NOTES
[facility]  Critical Care Medicine  Progress Note    Patient Name: Vaughn Retana  MRN: [id number]  Admission Date: 1/20/2020  Hospital Length of Stay: 8 days  Code Status: Full Code  Attending Provider: Ricky Morgan MD  Primary Care Provider: Cori Randall MD   Principal Problem: Seizure    Subjective:     HPI:  Patient is a 54 year old male with extensive medical history including ESRD on dialysis MWF (has not received it today), L below knee amputation 2/2 osteomyelitis, dCHF (last echo 8/2/19 [facility] [place]), GERD, h/o multiple ICH w/o residual deficits, and multiple instances of GI bleed (last EGD 11/12/19, esophagitis) who presents to ED [facility] [place] due to altered mental status. From NH report, nursing home staff went to wake the patient for his morning dialysis. He was confused, lethargic, had a moderate amount of brown colored emesis in his trash can. Patient last got dialysis on Friday. Patient is normally able to ambulate on his own and is alert and oriented; nursing staff reports that he appeared normal yesterday.       At the time of ICU consult, initial work up showed largely unremarkable cbc with no leukocytosis. CMP significant for a bicarb of 36; patient is ESRD with creatinine of 10.8. Patient still makes some urine, and UA was without evidence for UTI. Lactic acid acid was mildly elevated at 2.4 and troponin mildly elevated at .348->.339. INR is at 1.2. Alcohol and drug screen negative. Patient was given versed prior to undergoing CT scan; which showed no acute intraparenchymal hemorrhage or major vascular distribution, senescent changes, and remote lacunar type basal ganglia infarct. Shortly after CT, patient had a witnessed tonic clonic seizure. He received 2 mg of ativan and 1500 of keppra. Patient became non-responsive afterwards and was severely hypertensive with systolic bp in the 200's. Patient was intubated for airway protection and started on propofol and  Cardene drip for hypertension. Family updated over the phone and at bedside.                Hospital/ICU Course:  Patient admitted to critical care medicine on 1/20 with concerns of new onset seizures and s/p intubation for airway protection. Patient was started on vanc, rocephin, ampicillin, and acyclovir to cover for meningitis. Patient had spot EEG while on propofol in ED which did not show seizure activity. MRI done showed chronic changes but no acute abnormalities. Patient was taken off cardene drip soon after he arrived in ICU. Lumbar puncture was done after MRI. CSF labs were not convincing for bacterial or viral meningitis; although cultures are pending. Continuous EEG was done after propofol was stopped which showed epileptiform discharges. Per epilepsy, patient was given another dose of Keppra, will follow up after checking levels as patient is ESRD. Nephrology consulted and started HD. Pt became febrile the night of 01/22; blood cultures repeated and restarted on Vanc and Zosyn. HSV PCR neg so Acyclovir discontinued. Neurology would like SBP < 160; initiated home Carvedilol. Pt became hypotensive with HD on 1/24 so unable to remove fluid off during dialysis. BP trending back up and stable. Neurology will monitor patient again with EEG for seizures. 01/27 Patient's neuro exam is somewhat improved from day prior per nurse - opening eyes spontaneously and occasionally tracking movements, blinking to threat. Received dialysis today. EEG to end 15:30, will f/u Neuro recs regarding continuing Keppra or discontinuing.     Interval History/Significant Events:   NAEON. Off all sedation other than AED. Intubated, PEEP 5 and FiO2 21%. Some neurologic improvement - opening eyes spontaneously and occasionally tracking.        Review of Systems   Unable to perform ROS: Intubated     Objective:     Vital Signs (Most Recent):  Temp: 99.7 °F (37.6 °C) (01/28/20 0700)  Pulse: 86 (01/28/20 0735)  Resp: 14 (01/28/20  0735)  BP: (!) 147/94 (01/28/20 0700)  SpO2: 99 % (01/28/20 0735) Vital Signs (24h Range):  Temp:  [97.8 °F (36.6 °C)-99.7 °F (37.6 °C)] 99.7 °F (37.6 °C)  Pulse:  [] 86  Resp:  [12-28] 14  SpO2:  [91 %-100 %] 99 %  BP: (105-165)/(41-95) 147/94   Weight: 57 kg (125 lb 10.6 oz)  Body mass index is 20.28 kg/m².      Intake/Output Summary (Last 24 hours) at 1/28/2020 0814  Last data filed at 1/28/2020 0600  Gross per 24 hour   Intake 1130 ml   Output 2000 ml   Net -870 ml       Physical Exam   Constitutional: No distress.   HENT:   Head: Normocephalic and atraumatic.   Mouth/Throat: No oropharyngeal exudate.   Eyes: Pupils are equal, round, and reactive to light.   Neck: No JVD present.   Cardiovascular: Normal rate, regular rhythm and normal heart sounds.   No murmur heard.  Pulmonary/Chest:   Intubated, mechanical breath sounds, no significant rales   Abdominal: Soft. He exhibits no distension. There is no tenderness.   Musculoskeletal:   Left below knee amputation   Neurological:   PERRL. Opens eyes to voice. Intermittent tracking with eyes. No withdrawal or extension to pain in b/l upper and lower extremities. Cough, gag and corneal reflexes intact. Not following commands.   Skin: Skin is warm and dry. Capillary refill takes less than 2 seconds.   Vitals reviewed.      Vents:  Vent Mode: A/C (01/28/20 0735)  Ventilator Initiated: Yes (01/20/20 1327)  Set Rate: 12 bmp (01/28/20 0735)  Vt Set: 340 mL (01/24/20 1403)  Pressure Support: 10 cmH20 (01/26/20 0159)  PEEP/CPAP: 5 cmH20 (01/28/20 0735)  Oxygen Concentration (%): 21 (01/28/20 0735)  Peak Airway Pressure: 16 cmH2O (01/28/20 0735)  Plateau Pressure: 11 cmH20 (01/28/20 0735)  Total Ve: 5.42 mL (01/28/20 0735)  F/VT Ratio<105 (RSBI): (!) 32.11 (01/28/20 0735)  Lines/Drains/Airways     Drain                 Hemodialysis AV Fistula Right upper arm -- days         Hemodialysis AV Fistula Right upper arm -- days         NG/OG Tube 01/20/20 1932 Chai Brambila  nostril 7 days          Airway                 Airway - Non-Surgical 01/20/20 1325 Endotracheal Tube-Hi/Lo 7 days          Peripheral Intravenous Line                 Peripheral IV - Single Lumen 01/22/20 0012 20 G Anterior;Left;Proximal Forearm 6 days              Significant Labs:    CBC/Anemia Profile:  Recent Labs   Lab 01/26/20  1657 01/27/20  0312 01/28/20  0250   WBC 5.31 5.92 10.31   HGB 10.0* 11.2* 12.0*   HCT 33.2* 36.0* 39.6*    253 268   MCV 93 91 92   RDW 16.2* 15.9* 15.9*        Chemistries:  Recent Labs   Lab 01/27/20  0312 01/28/20  0250    138   K 4.7 4.4    101   CO2 20* 21*   BUN 95* 50*   CREATININE 10.1* 6.2*   CALCIUM 10.0 9.8   ALBUMIN 2.3* 2.5*   PROT 7.9 8.6*   BILITOT 0.4 0.4   ALKPHOS 175* 198*   ALT 41 47*   AST 47* 50*   MG 2.7* 2.7*   PHOS 5.1* 4.4       All pertinent labs within the past 24 hours have been reviewed.    Significant Imaging:  I have reviewed all pertinent imaging results/findings within the past 24 hours.      ABG  Recent Labs   Lab 01/21/20  2334   PH 7.361   PO2 40   PCO2 53.2*   HCO3 30.1*   BE 5     Assessment/Plan:     Neuro  * Seizure  Altered mental status    Patient initially presented to ED prior to getting dialysis due to AMS and brown emesis. Had witnessed tonic clonic seizure requiring ativan shortly after getting a CT head. He was loaded with 1500 mg Keppra, intubated for airway protection, and started on propofol for sedation. Upon physical exam, patient remained unresponsive to verbal or tactile stimuli and had upward left sided gaze with sluggish pupils. Concern for status epilepticus. Differential diagnosis includes polysubstance, sepsis, intracranial abnormalities, and PRES.     - Spot EEG without evidence of current seizure. However patient already received keppra, ativan, and sedated on propofol. 24 hour EEG with L sided structural lesion and underlying epileptiform potential. Initiated 1500 mg keppra on 01/22, per Neurology  recs.  - Substance induced: UDS negative, alcohol level wnl  - Sepsis: Initial lactate 2.4, likely due to seizure event; repeat was 1.9. Patient received 500 ml saline in ED. Initial blood cultures, sputum culture+gram stain neg  Lumbar puncture done, CSF not convincing for meningitis, so ampicillin, vancomycin, rocephin d/c'd on 01/22. However, pt was febrile on 11/23 to 101.3. Blood cultures repeated and restarted on Vanc and Zosyn. Repeat cultures NGTD. HSV PCR neg; Acyclovir discontinued. Prt has remained afebrile for > 72 hours. Vanc and Zosyn discontinued 01/26.   - Intracranial: patient with history of ICH with no functional deficits. Possibly multiple foci for seizure overtime. CT without acute changes, MRI brain with chronic changes and hypertensive angiopathy; no acute changes.  - PRES: Patient off cardene drip. MRI reviewed. Will follow blood pressure as patient is normally hypotensive.   - Neurology would like to repeat EEG to ensure that pt is not having any seizures. Finishing 15:30 on 01/27. Diffuse slowing, though no focal activity seen on EEG. 01/28 patient's mental status is continuing to improve day to day, now following commands. Passed SBT today, though plan on extubation for 01/29 due to weakness. Continue keppra 500 bid.      Cardiac/Vascular  Renovascular hypertension  Patient on coreg at nursing home; has been compliant as given by nursing home. Patient with hypertensive emergency on admit and started Cardene drip to titrate to BP of 160-180. Off Cardene drip since 01/20. Goal SBP < 160 per Neurology. Pt started on home Carvedilol on 01/24; BP well controlled.     -- Carvedilol 3.125 mg BID    Chronic diastolic heart failure  Last echo done at [facility] 8/2/19, EF>55%, bi-atrial enlargement      Renal/  ESRD on hemodialysis  Patient normally MWF dialysis. Last full dialysis session prior to admission was Friday 01/17/20.   Nephrology following for HD management. Will monitor for hypotension -  last dialysis 01/27.      Endocrine  Severe malnutrition  Nutrition consulted  Novasource @ 30 mL/hr to provide 1440 kcals, 65 g of protein, 516 mL fluid.     GI  Gastrointestinal hemorrhage with hematemesis  GERD with esophagitis    Patient with reports of coffee ground emesis prior to transport to ED. In ED, dark brown contents suctioned from stomach. Hemoglobin remains stable at 15 and patient is hypertensive. Patient is well known to GI. His most recent scope was in November that just showed esophagitis similar to his previous EGD. GI consulted and appreciate recommendations. Currently on 40 mg BID.      - H/H stable, will space CBC to daily  - PPI d/c'd with no further stigmata of bleeding. Famotidine ppx started 01/27       Critical Care Daily Checklist:    A: Awake: RASS Goal/Actual Goal: RASS Goal: 0-->alert and calm  Actual: Maddox Agitation Sedation Scale (RASS): Light sedation   B: Spontaneous Breathing Trial Performed?     C: SAT & SBT Coordinated?  Passed today                      D: Delirium: CAM-ICU Overall CAM-ICU: Positive   E: Early Mobility Performed? No   F: Feeding Goal: Goals: Meet % EEN, EPN  Status: Nutrition Goal Status: goal met   Current Diet Order   No orders of the defined types were placed in this encounter.      AS: Analgesia/Sedation none   T: Thromboembolic Prophylaxis hep   H: HOB > 300 Yes   U: Stress Ulcer Prophylaxis (if needed) famotidine   G: Glucose Control managing   B: Bowel Function Stool Occurrence: 1   I: Indwelling Catheter (Lines & Arcos) Necessity PIV x2  NGT  ETT   D: De-escalation of Antimicrobials/Pharmacotherapies n/a    Plan for the day/ETD Continue supportive management    Code Status:  Family/Goals of Care: Full Code         Critical secondary to Patient has an abrupt change in neurologic status: Seizure       Critical care was time spent personally by me on the following activities: development of treatment plan with patient or surrogate and bedside  caregivers, discussions with consultants, evaluation of patient's response to treatment, examination of patient, ordering and performing treatments and interventions, ordering and review of laboratory studies, ordering and review of radiographic studies, pulse oximetry, re-evaluation of patient's condition. This critical care time did not overlap with that of any other provider or involve time for any procedures.     Yimi Taylor MD  Critical Care Medicine  [facility]

## 2020-01-28 NOTE — PHYSICIAN QUERY
PT Name: Vaughn Retana  MR #: [id number]     Physician Query Form - Diagnosis Clarification      CDS: Jessica Shelby RN  Contact information: [email or web address]  This form is a permanent document in the medical record.     Query Date: January 28, 2020  By submitting this query, we are merely seeking further clarification of documentation.  Please utilize your independent clinical judgment when addressing the question(s) below.     The medical record contains the following:      Findings Supporting Clinical Information Location in Medical Record   Sepsis     Neuro  * Seizure  Altered mental status     Patient initially presented to ED prior to getting dialysis due to AMS and brown emesis. Had witnessed tonic clonic seizure requiring ativan shortly after getting a CT head. He was loaded with 1500 mg Keppra, intubated for airway protection, and started on propofol for sedation. Upon physical exam, patient remained unresponsive to verbal or tactile stimuli and had upward left sided gaze with sluggish pupils. Concern for status epilepticus. Differential diagnosis includes polysubstance, sepsis, intracranial abnormalities, and PRES.    At the time of ICU consult, initial work up showed largely unremarkable cbc with no leukocytosis. CMP significant for a bicarb of 36; patient is ESRD with creatinine of 10.8. Patient still makes some urine, and UA was without evidence for UTI. Lactic acid acid was mildly elevated at 2.4 and troponin mildly elevated at .348->.339. INR is at 1.2.    - Sepsis: Initial lactate 2.4, likely due to seizure event; repeat was 1.9. Patient received 500 ml saline in ED. Initial blood cultures, sputum culture+gram stain neg  Lumbar puncture done, CSF not convincing for meningitis, so ampicillin, vancomycin, rocephin d/c'd on 01/22. However, pt was febrile on 11/23 to 101.3. Blood cultures repeated and restarted on Vanc and Zosyn. Repeat cultures NGTD. HSV PCR neg; Acyclovir discontinued. Prt has  remained afebrile for > 72 hours. Will discontinue Vanc and Zosyn       H&P 1/20                                          Critical Care Med PN 1/25     Please clarify if the sepsis diagnosis has been:    [  ] Ruled In   [  x] Ruled In, Now Resolved   [  ] Ruled Out   [  ] Other/Clarification of findings (please specify):     [  ] Clinically undetermined     Please document in your progress notes daily for the duration of treatment, until resolved, and include in your discharge summary.

## 2020-01-28 NOTE — ASSESSMENT & PLAN NOTE
The patient is a 55 yo AAM admitted to MICU with seizure activity after presenting to the ED on 1/20 after being found with alter mental status at his NH facility Monday morning. The patient is currently on continuous EEG monitoring. Last dialyze on MWF.     Nephrology History  iHD Schedule: MWF  Unit/MD: [facility]/ Dr. Lemus  Duration: 3.5 hrs  EDW: ?  Access: [facility]   Resodial Renal Function: Yes (per records)    Plan:   - No need for HD today. Will reassess tomorrow.   - Daily renal function panels   - Renal diet or Novasource TF   - On mechanical ventilation with FiO at 21%  - Continue to monitor intake and output, daily weights   - Avoid nephrotoxic medication and renal dose medications to GFR  - Will discuss with

## 2020-01-28 NOTE — PROGRESS NOTES
[facility]  Neurology  Progress Note    Patient Name: Vaughn Retana  MRN: [id number]  Admission Date: 1/20/2020  Hospital Length of Stay: 8 days  Code Status: Full Code   Attending Provider: Ricky Morgan MD  Primary Care Physician: Cori Randall MD   Principal Problem:Seizure      Subjective:     Interval History:   Mental status improved overnight, more awake and alert today  Passed spontaneous breathing trial, primary team working towards extubation   Tracking room and following simple commands  vEEG reviewed by Dr. Newell--some occasional discharges in L frontotemporal region with slowing    Current Neurological Medications:   Keppra 500 mg BID    Current Facility-Administered Medications   Medication Dose Route Frequency Provider Last Rate Last Dose    0.9%  NaCl infusion   Intravenous Once Glenis Benavidez DNP, FNP-C        0.9%  NaCl infusion   Intravenous Once Glenis Benavidez DNP, FNP-C        acetaminophen tablet 650 mg  650 mg Per NG tube Q6H PRN Yomaira Cesar, NP   650 mg at 01/22/20 2010    carvedilol tablet 3.125 mg  3.125 mg Per OG tube BID Ricky Morgan MD   3.125 mg at 01/28/20 0803    chlorhexidine 0.12 % solution 15 mL  15 mL Mouth/Throat BID Bharat Ramirez NP   15 mL at 01/28/20 0809    famotidine 40 mg/5 mL (8 mg/mL) suspension 20 mg  20 mg Oral Daily Yimi Taylor MD   20 mg at 01/28/20 0803    heparin (porcine) injection 5,000 Units  5,000 Units Subcutaneous Q8H Danielle Arnold MD   5,000 Units at 01/28/20 0613    levETIRAcetam in NaCl (iso-os) IVPB 500 mg  500 mg Intravenous Q12H Dakota Alfredo  mL/hr at 01/28/20 0803 500 mg at 01/28/20 0803    multivitamin liquid no.118 per dose 10 mL  10 mL Oral Daily Danielle Arnold MD   10 mL at 01/28/20 0803    sevelamer carbonate pwpk 1.6 g  1.6 g Per OG tube TID Bryan Merrill MD   1.6 g at 01/28/20 0803    sodium chloride 0.9% flush 10 mL  10 mL Intravenous PRN Colt Gonzales MD         thiamine (B-1) 100 mg in dextrose 5 % 50 mL IVPB  100 mg Intravenous Daily Danielle Arnold MD 12.5 mL/hr at 01/28/20 0853 100 mg at 01/28/20 0853     Review of Systems   Unable to perform ROS: Intubated     Objective:     Vital Signs (Most Recent):  Temp: 99.6 °F (37.6 °C) (01/28/20 1100)  Pulse: 86 (01/28/20 1309)  Resp: (!) 28 (01/28/20 1309)  BP: (!) 115/43 (01/28/20 1100)  SpO2: 100 % (01/28/20 1309) Vital Signs (24h Range):  Temp:  [98 °F (36.7 °C)-99.7 °F (37.6 °C)] 99.6 °F (37.6 °C)  Pulse:  [] 86  Resp:  [12-28] 28  SpO2:  [92 %-100 %] 100 %  BP: (105-165)/(43-95) 115/43     Weight: 57 kg (125 lb 10.6 oz)  Body mass index is 20.28 kg/m².    Physical Exam   Constitutional: No distress.   Pulmonary/Chest:   Intubated      Skin: He is not diaphoretic.       NEUROLOGICAL EXAMINATION:     MENTAL STATUS   Level of consciousness: alert       Eyes open  Tracking room  Attends to voice  Follows simple commands (squeeze hand and wiggles toes on R foot)      CRANIAL NERVES     CN III, IV, VI   Nystagmus: none   Ophthalmoparesis: none    CN VIII   Hearing: intact       Tracks room  Attends to voice   Face symmetric at rest      MOTOR EXAM   Muscle bulk: normal  Overall muscle tone: normal       Squeezes hand and wiggles toes on R foot     GAIT AND COORDINATION     Tremor   Resting tremor: absent    Significant Labs:   CBC:   Recent Labs   Lab 01/26/20  1657 01/27/20 0312 01/28/20  0250   WBC 5.31 5.92 10.31   HGB 10.0* 11.2* 12.0*   HCT 33.2* 36.0* 39.6*    253 268     CMP:   Recent Labs   Lab 01/27/20  0312 01/28/20  0250   * 169*    138   K 4.7 4.4    101   CO2 20* 21*   BUN 95* 50*   CREATININE 10.1* 6.2*   CALCIUM 10.0 9.8   MG 2.7* 2.7*   PROT 7.9 8.6*   ALBUMIN 2.3* 2.5*   BILITOT 0.4 0.4   ALKPHOS 175* 198*   AST 47* 50*   ALT 41 47*   ANIONGAP 20* 16   EGFRNONAA 5.2* 9.3*     Inflammatory Markers: No results for input(s): SEDRATE, CRP, PROCAL in the last 48 hours.  Respiratory  Culture: No results for input(s): GSRESP, RESPIRATORYC in the last 48 hours.  Urine Culture: No results for input(s): LABURIN in the last 48 hours.  Urine Studies: No results for input(s): COLORU, APPEARANCEUA, PHUR, SPECGRAV, PROTEINUA, GLUCUA, KETONESU, BILIRUBINUA, OCCULTUA, NITRITE, UROBILINOGEN, LEUKOCYTESUR, RBCUA, WBCUA, BACTERIA, SQUAMEPITHEL, HYALINECASTS in the last 48 hours.    Invalid input(s): WRIGHTSUR  All pertinent lab results from the past 24 hours have been reviewed.    vEEG (1/27-1/28/20): 23 hr 54 min  This is an abnormal continuous EEG monitoring study because of multifocal slowing with occasional epileptiform discharges in the left frontotemporal region consistent with multiple areas of focal cortical dysfunction and a potential seizure focus.  There are bursts of frontally predominant generalized slowing consistent with subcortical or deep midline dysfunction.  There are no electrographic seizures during this recording session.  Compared to the previous day's recording, focal slowing with epileptiform potential is more apparent on this recording session.    Significant Imaging: I have reviewed and interpreted all pertinent imaging results/findings within the past 24 hours.     MRI Brain WO contrast (1/20/20):  1. No acute intracranial abnormality identified.  2. Remote microhemorrhages throughout the brain in a predominantly central distribution suggesting hypertensive microangiopathy.  3. Chronic microvascular ischemic change.    Assessment and Plan:     * Seizure  See encephalopathy section   --increase Keppra to 1 g BID    Gastrointestinal hemorrhage with hematemesis  -continue mgt per primary team    Encephalopathy  53 y/o male with a medical history of ESRD on HD, left below the knee amputation (2/2 osteomyelitis), CHF, multiple ICH with extensive cerebral microbleeds (5/203- left BG, 9/2016 right basal ganglia hemorrhage, 11/2016  R striatocapsular ICH with IVH) and previous GI bleeds  (EGD 2019 shows esophagitis) presented to the ED on 1/20/20 from [facility] due to altered mental status.      1/28-clinically improved overnight--more interactive and intermittently following simple commands today  EEG overnight with multifocal slowing  and  occasional epileptiform discharges in the left frontotemporal region    Passed SBT today    Recommendations  --increase Keppra to 1 g BID given EEG findings and cerebral microbleeds  Unhooked from EEG, please call back if clinical event concerning for seizure occurs   --continue correction of metabolic derangements per primary   BUN improved today 95>>50     ESRD on hemodialysis  Continue dialysis per primary team and Nephrology    VTE Risk Mitigation (From admission, onward)         Ordered     heparin (porcine) injection 5,000 Units  Every 8 hours      01/24/20 1101     Place sequential compression device  Until discontinued      01/20/20 1503     IP VTE HIGH RISK PATIENT  Once      01/20/20 1503              Neurology to sign off. Please call back with any questions or clarifications    Esther Horner PA-C  General Neurology Consult  Neuro Consult [facility] # [id number]

## 2020-01-29 NOTE — PROGRESS NOTES
[facility]  Critical Care Medicine  Progress Note    Patient Name: Vaughn Retana  MRN: [id number]  Admission Date: 1/20/2020  Hospital Length of Stay: 9 days  Code Status: Full Code  Attending Provider: Ricky Morgan MD  Primary Care Provider: Cori Randall MD   Principal Problem: Seizure    Subjective:     HPI:  Patient is a 54 year old male with extensive medical history including ESRD on dialysis MWF (has not received it today), L below knee amputation 2/2 osteomyelitis, dCHF (last echo 8/2/19 [facility]), GERD, h/o multiple ICH w/o residual deficits, and multiple instances of GI bleed (last EGD 11/12/19, esophagitis) who presents to ED [facility] [place] due to altered mental status. From NH report, nursing home staff went to wake the patient for his morning dialysis. He was confused, lethargic, had a moderate amount of brown colored emesis in his trash can. Patient last got dialysis on Friday. Patient is normally able to ambulate on his own and is alert and oriented; nursing staff reports that he appeared normal yesterday.       At the time of ICU consult, initial work up showed largely unremarkable cbc with no leukocytosis. CMP significant for a bicarb of 36; patient is ESRD with creatinine of 10.8. Patient still makes some urine, and UA was without evidence for UTI. Lactic acid acid was mildly elevated at 2.4 and troponin mildly elevated at .348->.339. INR is at 1.2. Alcohol and drug screen negative. Patient was given versed prior to undergoing CT scan; which showed no acute intraparenchymal hemorrhage or major vascular distribution, senescent changes, and remote lacunar type basal ganglia infarct. Shortly after CT, patient had a witnessed tonic clonic seizure. He received 2 mg of ativan and 1500 of keppra. Patient became non-responsive afterwards and was severely hypertensive with systolic bp in the 200's. Patient was intubated for airway protection and started on propofol and  "Cardene drip for hypertension. Family updated over the phone and at bedside.                Hospital/ICU Course:  Patient admitted to critical care medicine on 1/20 with concerns of new onset seizures and s/p intubation for airway protection. Patient was started on vanc, rocephin, ampicillin, and acyclovir to cover for meningitis. Patient had spot EEG while on propofol in ED which did not show seizure activity. MRI done showed chronic changes but no acute abnormalities. Patient was taken off cardene drip soon after he arrived in ICU. Lumbar puncture was done after MRI. CSF labs were not convincing for bacterial or viral meningitis; although cultures are pending. Continuous EEG was done after propofol was stopped which showed epileptiform discharges. Per epilepsy, patient was given another dose of Keppra, will follow up after checking levels as patient is ESRD. Nephrology consulted and started HD. Pt became febrile the night of 01/22; blood cultures repeated and restarted on Vanc and Zosyn. HSV PCR neg so Acyclovir discontinued. Neurology would like SBP < 160; initiated home Carvedilol. Pt became hypotensive with HD on 1/24 so unable to remove fluid off during dialysis. BP trending back up and stable. Neurology will monitor patient again with EEG for seizures. 01/27 Patient's neuro exam is somewhat improved from day prior per nurse - opening eyes spontaneously and occasionally tracking movements, blinking to threat. Received dialysis today. EEG to end 15:30, will f/u Neuro recs regarding continuing Keppra or discontinuing. 01/28 passed SBT today, plan to extubate 01/29. Repeat 24 hr EEG shows "multifocal slowing consistent with multiple areas of focal cortical dysfunction and occasional epileptiform discharges in the left frontotemporal region consistent with a potential seizure focus in this area" with no electrographic seizures. 01/29 Originally planned for extubation today, but will defer in light of BRBPR ~08:30 " per nurse. GI consulted, no scope indicated at this time. Will restart PPI. Otherwise mental status improving in small increments daily. Keppra decreased to 500 mg bid.    No new subjective & objective note has been filed under this hospital service since the last note was generated.      ABG  No results for input(s): PH, PO2, PCO2, HCO3, BE in the last 168 hours.  Assessment/Plan:     Neuro  * Seizure  Altered mental status    Patient initially presented to ED prior to getting dialysis due to AMS and brown emesis. Had witnessed tonic clonic seizure requiring ativan shortly after getting a CT head. He was loaded with 1500 mg Keppra, intubated for airway protection, and started on propofol for sedation. Upon physical exam, patient remained unresponsive to verbal or tactile stimuli and had upward left sided gaze with sluggish pupils. Concern for status epilepticus. Differential diagnosis includes polysubstance, sepsis, intracranial abnormalities, and PRES. 01/29 mental status continues to slowly improve daily.    - Spot EEG without evidence of current seizure. However patient already received keppra, ativan, and sedated on propofol. 24 hour EEG with L sided structural lesion and underlying epileptiform potential. Initiated 1500 mg keppra on 01/22, per Neurology recs.  - Substance induced: UDS negative, alcohol level wnl  - Sepsis: Initial lactate 2.4, likely due to seizure event; repeat was 1.9. Patient received 500 ml saline in ED. Initial blood cultures, sputum culture+gram stain neg  Lumbar puncture done, CSF not convincing for meningitis, so ampicillin, vancomycin, rocephin d/c'd on 01/22. However, pt was febrile on 11/23 to 101.3. Blood cultures repeated and restarted on Vanc and Zosyn. Repeat cultures NGTD. HSV PCR neg; Acyclovir discontinued. Prt has remained afebrile for > 72 hours. Vanc and Zosyn discontinued 01/26.   - Intracranial: patient with history of ICH with no functional deficits. Possibly multiple  foci for seizure overtime. CT without acute changes, MRI brain with chronic changes and hypertensive angiopathy; no acute changes.  - PRES: Patient off cardene drip. MRI reviewed. Will follow blood pressure as patient is normally hypotensive.   - Neurology would like to repeat EEG to ensure that pt is not having any seizures. Finishing 15:30 on 01/27. Diffuse slowing, though no focal activity seen on EEG. 01/28 patient's mental status is continuing to improve day to day, now following commands. Passed SBT 01/28. Plan for extubation 01/29 deferred in light of suspected LGIB. Keppra brought back to 500 mg bid.    Cardiac/Vascular  Renovascular hypertension  Patient on coreg at nursing home; has been compliant as given by nursing home. Patient with hypertensive emergency on admit and started Cardene drip to titrate to BP of 160-180. Off Cardene drip since 01/20. Goal SBP < 160 per Neurology. Pt started on home Carvedilol on 01/24; BP well controlled.     -- continue home carvedilol 3.125 mg BID    Chronic diastolic heart failure  Last echo done at [facility] 8/2/19, EF>55%, bi-atrial enlargement      Renal/  ESRD on hemodialysis  Patient normally MWF dialysis. Last full dialysis session prior to admission was Friday 01/17/20.   Nephrology following for HD management. Patient normally taking midodrine 5 mg 30 minutes prior to HD sessions at home. After initial episode of hypotension on HD, patient has tolerated dialysis sessions well.    - Continue to monitor for hypotension during HD sessions    Endocrine  Severe malnutrition  Nutrition consulted  Novasource @ 30 mL/hr to provide 1440 kcals, 65 g of protein, 516 mL fluid.     - Continue TFs    GI  Gastrointestinal hemorrhage with hematemesis  GERD with esophagitis    Patient with reports of coffee ground emesis prior to transport to ED. In ED, dark brown contents suctioned from stomach. Hemoglobin remains stable at 15 and patient is hypertensive. Patient is well known to  GI. His most recent scope was in November that just showed esophagitis similar to his previous EGD. GI consulted and appreciate recommendations. Currently on 40 mg BID. 01/29 - patient had BRBPR ~08:30 per nurse. GI consulted - no scope indicated at this time. Will switch to PPI from famotidine.    - H/H stable, will space CBC to daily  - PPI restarted 01/29      Critical secondary to Patient has an abrupt change in neurologic status: acute encephalopathy - post ictal vs metabolic      Critical care was time spent personally by me on the following activities: development of treatment plan with patient or surrogate and bedside caregivers, discussions with consultants, evaluation of patient's response to treatment, examination of patient, ordering and performing treatments and interventions, ordering and review of laboratory studies, ordering and review of radiographic studies, pulse oximetry, re-evaluation of patient's condition. This critical care time did not overlap with that of any other provider or involve time for any procedures.     Yimi Taylor MD  Critical Care Medicine  [facility]

## 2020-01-29 NOTE — ASSESSMENT & PLAN NOTE
Altered mental status    Patient initially presented to ED prior to getting dialysis due to AMS and brown emesis. Had witnessed tonic clonic seizure requiring ativan shortly after getting a CT head. He was loaded with 1500 mg Keppra, intubated for airway protection, and started on propofol for sedation. Upon physical exam, patient remained unresponsive to verbal or tactile stimuli and had upward left sided gaze with sluggish pupils. Concern for status epilepticus. Differential diagnosis includes polysubstance, sepsis, intracranial abnormalities, and PRES. 01/29 mental status continues to slowly improve daily.    - Spot EEG without evidence of current seizure. However patient already received keppra, ativan, and sedated on propofol. 24 hour EEG with L sided structural lesion and underlying epileptiform potential. Initiated 1500 mg keppra on 01/22, per Neurology recs.  - Substance induced: UDS negative, alcohol level wnl  - Sepsis: Initial lactate 2.4, likely due to seizure event; repeat was 1.9. Patient received 500 ml saline in ED. Initial blood cultures, sputum culture+gram stain neg  Lumbar puncture done, CSF not convincing for meningitis, so ampicillin, vancomycin, rocephin d/c'd on 01/22. However, pt was febrile on 11/23 to 101.3. Blood cultures repeated and restarted on Vanc and Zosyn. Repeat cultures NGTD. HSV PCR neg; Acyclovir discontinued. Prt has remained afebrile for > 72 hours. Vanc and Zosyn discontinued 01/26.   - Intracranial: patient with history of ICH with no functional deficits. Possibly multiple foci for seizure overtime. CT without acute changes, MRI brain with chronic changes and hypertensive angiopathy; no acute changes.  - PRES: Patient off cardene drip. MRI reviewed. Will follow blood pressure as patient is normally hypotensive.   - Neurology would like to repeat EEG to ensure that pt is not having any seizures. Finishing 15:30 on 01/27. Diffuse slowing, though no focal activity seen on EEG.  01/28 patient's mental status is continuing to improve day to day, now following commands. Passed SBT 01/28. Plan for extubation 01/29 deferred in light of suspected LGIB. Keppra brought back to 500 mg bid.

## 2020-01-29 NOTE — PT/OT/SLP PROGRESS
Occupational Therapy      Patient Name:  Vaughn Retana   MRN:  [id number]    Patient did pass MOVE screen this date. However, pt with bloody BM this AM per nursing.  OT to HOLD this date and will check status at later date to proceed with OT evaluation.   Tameka Farmer, LOTR  1/29/2020

## 2020-01-29 NOTE — SUBJECTIVE & OBJECTIVE
Interval History: NAEON. Patient remains intubated. Passed SBT on yesterday. Patient alert this AM, following some commands.   Last dialyze on 1/27.    Review of patient's allergies indicates:   Allergen Reactions    Fosrenol [lanthanum] Nausea And Vomiting     Nausea and vomiting     Current Facility-Administered Medications   Medication Frequency    0.9%  NaCl infusion Once    acetaminophen tablet 650 mg Q6H PRN    carvedilol tablet 3.125 mg BID    chlorhexidine 0.12 % solution 15 mL BID    levETIRAcetam in NaCl (iso-os) IVPB 500 mg Q12H    pantoprazole injection 40 mg Q12H    sevelamer carbonate pwpk 1.6 g TID    sodium chloride 0.9% flush 10 mL PRN       Objective:     Vital Signs (Most Recent):  Temp: 97.8 °F (36.6 °C) (01/29/20 1100)  Pulse: 77 (01/29/20 1115)  Resp: (!) 7 (01/29/20 1115)  BP: (!) 148/62 (01/29/20 1100)  SpO2: 100 % (01/29/20 1115)  O2 Device (Oxygen Therapy): ventilator (01/29/20 1115) Vital Signs (24h Range):  Temp:  [97.8 °F (36.6 °C)-99 °F (37.2 °C)] 97.8 °F (36.6 °C)  Pulse:  [76-92] 77  Resp:  [7-28] 7  SpO2:  [96 %-100 %] 100 %  BP: (104-175)/() 148/62     Weight: 57 kg (125 lb 10.6 oz) (01/24/20 1100)  Body mass index is 20.28 kg/m².  Body surface area is 1.63 meters squared.    I/O last 3 completed shifts:  In: 1605 [I.V.:65; NG/GT:1190; IV Piggyback:350]  Out: -     Physical Exam   Constitutional: He appears well-nourished. He appears ill. He is intubated.   HENT:   Head: Normocephalic and atraumatic.   Mouth/Throat: No oropharyngeal exudate.   Eyes: Right eye exhibits no discharge. Left eye exhibits no discharge. No scleral icterus.   Neck: Normal range of motion. Neck supple. No JVD present.   Cardiovascular: Regular rhythm and normal heart sounds.   No murmur heard.  Pulmonary/Chest: He is intubated. He has no rales.   Abdominal: Soft. Bowel sounds are normal. He exhibits no distension.   Musculoskeletal: Normal range of motion. He exhibits deformity (LBKA). He  exhibits no edema.   Left below knee amputation   Neurological: He is alert.   Skin: Skin is warm and dry.   Vitals reviewed.      Significant Labs:  CBC:   Recent Labs   Lab 01/29/20  0854   WBC 10.24   RBC 4.01*   HGB 11.3*   HCT 36.7*      MCV 92   MCH 28.2   MCHC 30.8*     CMP:   Recent Labs   Lab 01/29/20  0415      CALCIUM 10.1   ALBUMIN 2.4*   PROT 8.4      K 4.1   CO2 21*      BUN 81*   CREATININE 8.7*   ALKPHOS 208*   ALT 37   AST 36   BILITOT 0.4     All labs within the past 24 hours have been reviewed.

## 2020-01-29 NOTE — ASSESSMENT & PLAN NOTE
Last [place] [facility] [place]19, EF>55%, bi-atrial enlargement

## 2020-01-29 NOTE — PROGRESS NOTES
[facility]  Nephrology  Progress Note    Patient Name: Vaughn Retana  MRN: [id number]  Admission Date: 1/20/2020  Hospital Length of Stay: 9 days  Attending Provider: Ricky Morgan MD   Primary Care Physician: Cori Randall MD  Principal Problem:Seizure    Subjective:     Interval History: NAEON. Patient remains intubated. Passed SBT on yesterday. Patient alert this AM, following some commands.   Last dialyze on 1/27.    Review of patient's allergies indicates:   Allergen Reactions    Fosrenol [lanthanum] Nausea And Vomiting     Nausea and vomiting     Current Facility-Administered Medications   Medication Frequency    0.9%  NaCl infusion Once    acetaminophen tablet 650 mg Q6H PRN    carvedilol tablet 3.125 mg BID    chlorhexidine 0.12 % solution 15 mL BID    levETIRAcetam in NaCl (iso-os) IVPB 500 mg Q12H    pantoprazole injection 40 mg Q12H    sevelamer carbonate pwpk 1.6 g TID    sodium chloride 0.9% flush 10 mL PRN       Objective:     Vital Signs (Most Recent):  Temp: 97.8 °F (36.6 °C) (01/29/20 1100)  Pulse: 77 (01/29/20 1115)  Resp: (!) 7 (01/29/20 1115)  BP: (!) 148/62 (01/29/20 1100)  SpO2: 100 % (01/29/20 1115)  O2 Device (Oxygen Therapy): ventilator (01/29/20 1115) Vital Signs (24h Range):  Temp:  [97.8 °F (36.6 °C)-99 °F (37.2 °C)] 97.8 °F (36.6 °C)  Pulse:  [76-92] 77  Resp:  [7-28] 7  SpO2:  [96 %-100 %] 100 %  BP: (104-175)/() 148/62     Weight: 57 kg (125 lb 10.6 oz) (01/24/20 1100)  Body mass index is 20.28 kg/m².  Body surface area is 1.63 meters squared.    I/O last 3 completed shifts:  In: 1605 [I.V.:65; NG/GT:1190; IV Piggyback:350]  Out: -     Physical Exam   Constitutional: He appears well-nourished. He appears ill. He is intubated.   HENT:   Head: Normocephalic and atraumatic.   Mouth/Throat: No oropharyngeal exudate.   Eyes: Right eye exhibits no discharge. Left eye exhibits no discharge. No scleral icterus.   Neck: Normal range of motion. Neck supple.  No JVD present.   Cardiovascular: Regular rhythm and normal heart sounds.   No murmur heard.  Pulmonary/Chest: He is intubated. He has no rales.   Abdominal: Soft. Bowel sounds are normal. He exhibits no distension.   Musculoskeletal: Normal range of motion. He exhibits deformity (LBKA). He exhibits no edema.   Left below knee amputation   Neurological: He is alert.   Skin: Skin is warm and dry.   Vitals reviewed.      Significant Labs:  CBC:   Recent Labs   Lab 01/29/20  0854   WBC 10.24   RBC 4.01*   HGB 11.3*   HCT 36.7*      MCV 92   MCH 28.2   MCHC 30.8*     CMP:   Recent Labs   Lab 01/29/20  0415      CALCIUM 10.1   ALBUMIN 2.4*   PROT 8.4      K 4.1   CO2 21*      BUN 81*   CREATININE 8.7*   ALKPHOS 208*   ALT 37   AST 36   BILITOT 0.4     All labs within the past 24 hours have been reviewed.         Assessment/Plan:     * Seizure  - per primary team     ESRD on hemodialysis  The patient is a 53 yo AAM admitted to MICU with seizure activity after presenting to the ED on 1/20 after being found with alter mental status at his NH facility Monday morning. The patient is currently on continuous EEG monitoring. Last dialyze on MWF.     Nephrology History  iHD Schedule: MWF  Unit/MD: [facility]/ Dr. Lemus  Duration: 3.5 hrs  EDW: ?  Access: RICHARD AVF   Resodial Renal Function: Yes (per records)    Plan:   - Will plan for HD today for metabolic clearance and volume management, target UF 2-2.5 L as tolerated, keep MAP >65.   - Daily renal function panels   - Renal diet or Novasource TF   - On mechanical ventilation with FiO at 21%  - Continue to monitor intake and output, daily weights   - Avoid nephrotoxic medication and renal dose medications to GFR  - Will discuss with         Thank you for your consult. I will follow-up with patient. Please contact us if you have any additional questions.    Glenis Benavidez DNP, FNP-C  Nephrology  [facility]

## 2020-01-29 NOTE — ASSESSMENT & PLAN NOTE
Nutrition consulted  Novasource @ 30 mL/hr to provide 1440 kcals, 65 g of protein, 516 mL fluid.     - Continue TFs

## 2020-01-29 NOTE — PROGRESS NOTES
"  [facility]  Adult Nutrition  Consult Note    SUMMARY     Recommendations    1. If able to extubate & advance diet, recommend Renal diet (texture per SLP). Add Novasource ONS to aid in caloric intake.  2. If unable to extubate, resume enteral nutrition regimen of Novasource @ 30 mL/hr.   3. RD to monitor & follow-up.    Goals: Meet % EEN, EPN  Nutrition Goal Status: goal met  Communication of RD Recs: reviewed with RN    Reason for Assessment    Reason For Assessment: RD follow-up  Diagnosis: other (see comments)(Seizures)  Relevant Medical History: HTN, ESRD on HD, L. BKA  Interdisciplinary Rounds: did not attend    General Information Comments: Pt remains intubated, SBT this AM. TFs on hold. Pt receiving HD. NFPE complete  - pt continues w/ severe malnutrition.  Nutrition Discharge Planning: Unable to determine    Nutrition/Diet History    Spiritual, Cultural Beliefs, Sikh Practices, Values that Affect Care: other (see comments)(GONZALO)  Factors Affecting Nutritional Intake: NPO, on mechanical ventilation    Anthropometrics    Temp: 97.8 °F (36.6 °C)  Height: 5' 6" (167.6 cm)  Height (inches): 66 in  Weight Method: Bed Scale  Weight: 57 kg (125 lb 10.6 oz)  Weight (lb): 125.66 lb  Ideal Body Weight (IBW), Male: 142 lb  % Ideal Body Weight, Male (lb): 88.49 %  BMI (Calculated): 20.3  BMI Grade: 18.5-24.9 - normal  Usual Body Weight (UBW), k kg  % Usual Body Weight: 86.54  % Weight Change From Usual Weight: -13.64 %  Amputation %: 5.9  Total Amputation %: 5.9  Amputation Ideal Body Weight (IBW), Male (lb): 136.1 lb  Amputee BMI (kg/m2): 21.61 kg/m2    Lab/Procedures/Meds    Pertinent Labs Reviewed: reviewed  Pertinent Labs Comments: BUN 81, Creat 8.7, GFR 7.1  Pertinent Medications Reviewed: reviewed  Pertinent Medications Comments: -    Estimated/Assessed Needs    Weight Used For Calorie Calculations: 57 kg (125 lb 10.6 oz)     Energy Calorie Requirements (kcal): 1422 " kcal/d  Energy Need Method: [facility]     Protein Requirements: 69-86 g/d (1.2-1.5 g/kg)  Weight Used For Protein Calculations: 57 kg (125 lb 10.6 oz)     Estimated Fluid Requirement Method: other (see comments)(Per MD or 1 mL/kcal)    Nutrition Prescription Ordered    Current Diet Order: NPO    Evaluation of Received Nutrient/Fluid Intake    Comments: LBM: 1/28    Nutrition Risk    Level of Risk/Frequency of Follow-up: (2x/week)     Assessment and Plan    Severe malnutrition    Nutrition Problem:  Severe Protein-Calorie Malnutrition  Malnutrition in the context of Chronic Illness/Injury    Related to (etiology):  Inadequate energy intake    Signs and Symptoms (as evidenced by):  Body Fat Depletion: severe depletion of orbitals and triceps   Muscle Mass Depletion: severe depletion of temples, clavicle region and interosseous muscle   Weight Loss: 14% x 6 months     Interventions(treatment strategy):  Collaboration of nutrition care w/ other providers    Nutrition Diagnosis Status:  Continues     Monitor and Evaluation    Food and Nutrient Intake: energy intake, food and beverage intake, enteral nutrition intake  Food and Nutrient Adminstration: diet order, enteral and parenteral nutrition administration  Physical Activity and Function: nutrition-related ADLs and IADLs  Anthropometric Measurements: weight, weight change  Biochemical Data, Medical Tests and Procedures: inflammatory profile, lipid profile, glucose/endocrine profile, gastrointestinal profile, electrolyte and renal panel  Nutrition-Focused Physical Findings: overall appearance     Malnutrition Assessment    Weight Loss (Malnutrition): greater than 10% in 6 months  Energy Intake (Malnutrition): other (see comments)(GONZALO)  Subcutaneous Fat (Malnutrition): severe depletion  Muscle Mass (Malnutrition): severe depletion   Orbital Region (Subcutaneous Fat Loss): severe depletion  Upper Arm Region (Subcutaneous Fat Loss): severe depletion   [place] Region  (Muscle Loss): severe depletion  Clavicle Bone Region (Muscle Loss): severe depletion  Scapular Bone Region (Muscle Loss): severe depletion  Dorsal Hand (Muscle Loss): severe depletion  Anterior Thigh Region (Muscle Loss): (GONZALO)  Posterior Calf Region (Muscle Loss): (GONZALO)     Nutrition Follow-Up    RD Follow-up?: Yes

## 2020-01-29 NOTE — PROGRESS NOTES
HD initiated, cannulated upper right arm AVF with 15 gauge needles . Good blood flow noted, / . Net uf goal set for 2 liater as tolerated with map >65. Patient alert and responsive to verbal commands. B/P 126/72 pulse 80. Patient remains on Vent

## 2020-01-29 NOTE — ASSESSMENT & PLAN NOTE
The patient is a 55 yo AAM admitted to MICU with seizure activity after presenting to the ED on 1/20 after being found with alter mental status at his NH facility Monday morning. The patient is currently on continuous EEG monitoring. Last dialyze on MWF.     Nephrology History  iHD Schedule: MWF  Unit/MD: [facility]/ Dr. Lemus  Duration: 3.5 hrs  EDW: ?  Access: RICHARD AVF   Resodial Renal Function: Yes (per records)    Plan:   - Will plan for HD today for metabolic clearance and volume management, target UF 2-2.5 L as tolerated, keep MAP >65.   - Daily renal function panels   - Renal diet or Novasource TF   - On mechanical ventilation with FiO at 21%  - Continue to monitor intake and output, daily weights   - Avoid nephrotoxic medication and renal dose medications to GFR  - Will discuss with

## 2020-01-29 NOTE — PT/OT/SLP PROGRESS
Physical Therapy      Patient Name:  Vaughn Retana   MRN:  [id number]    Patient not seen today. Pt passed MOVE screen but had a large bloody BM just prior to session. Will follow up when appropriate     Sujatha Andrade PT, DPT  1/29/2020  [phone]

## 2020-01-29 NOTE — TREATMENT PLAN
[facility] [address]  Gastroenterology Treatment Plan        Objective:     Vitals:    10/11/19 0622   BP: 110/60   Pulse:    Resp:    Temp:              Significant Labs:  Recent Labs   Lab 01/28/20  0250 01/29/20  0320 01/29/20  0854   HGB 12.0* 11.2* 11.3*       Lab Results   Component Value Date    WBC 10.24 01/29/2020    HGB 11.3 (L) 01/29/2020    HCT 36.7 (L) 01/29/2020    MCV 92 01/29/2020     01/29/2020       Lab Results   Component Value Date     01/29/2020    K 4.1 01/29/2020     01/29/2020    CO2 21 (L) 01/29/2020    BUN 81 (H) 01/29/2020    CREATININE 8.7 (H) 01/29/2020    CALCIUM 10.1 01/29/2020    ANIONGAP 17 (H) 01/29/2020    ESTGFRAFRICA 7.1 (A) 01/29/2020    EGFRNONAA 6.2 (A) 01/29/2020       Lab Results   Component Value Date    ALT 37 01/29/2020    AST 36 01/29/2020    ALKPHOS 208 (H) 01/29/2020    BILITOT 0.4 01/29/2020       Lab Results   Component Value Date    INR 1.2 01/20/2020    INR 1.0 11/22/2019    INR 1.1 11/11/2019           Subjective:     Intubated and sedated, examined stool.     Assessment/Plan:   History of gastroparesis and severe esophagitis, multiple scopes showing esophagitis. We saw him initially when admitted on 1/20 for possbile UGIB, no significant drop in hb, currently intubated and sedated in ICU. Called today for BRBPR happened around 8AM hb 11.3 no change from hb obtained at 3AM 11.2. . Examined stool personally myself, bright red, no rectal tube in place, had colonoscopy in 2017 had polyps.  No further bleeding per nursing after that. HR normal, normal BUN to cr ratio, negative for blood on NG to suction.       Problem List:  1. Hematochezia, vitally stable   2. Grade D esophagitis  3. No signs of UGIB.     Plan:  -Place 2 large bore IVs, volume resuscitation per primary  -Transfuse pRBC for Hb < 7 g/dL (Consider a higher Hb target if there is clinical evidence of intravascular volume depletion or comorbidities, such as CAD or if high  suspicion of vigorous active ongoing bleeding or an uncorrected coagulopathy exists.).  -Correct coagulopathy (goal plt >50, INR <1.5) if present in patients without absolute contraindications.  - IV PPI 40 BID for esophagitis  -Avoid nonsteroidal agents, antiplatelet agents and anticoagulants if possible in patients without absolute contraindications  -no plans for endoscopy at this time. Call if further episodes of bleeding or significant drop in hb.   -Please call with any additional questions, concerns or changes in the patient's clinical status.    High-quality evidence per [employer]    Thank you for involving us in the care of Vaughn Retana. Please call with any additional questions, concerns or changes in the patient's clinical status.    Sotero Ojeda MD  Gastroenterology Fellow PGY IV   [facility]

## 2020-01-29 NOTE — ASSESSMENT & PLAN NOTE
Patient on coreg at nursing home; has been compliant as given by nursing home. Patient with hypertensive emergency on admit and started Cardene drip to titrate to BP of 160-180. Off Cardene drip since 01/20. Goal SBP < 160 per Neurology. Pt started on home Carvedilol on 01/24; BP well controlled.     -- continue home carvedilol 3.125 mg BID

## 2020-01-29 NOTE — ASSESSMENT & PLAN NOTE
GERD with esophagitis    Patient with reports of coffee ground emesis prior to transport to ED. In ED, dark brown contents suctioned from stomach. Hemoglobin remains stable at 15 and patient is hypertensive. Patient is well known to GI. His most recent scope was in November that just showed esophagitis similar to his previous EGD. GI consulted and appreciate recommendations. Currently on 40 mg BID. 01/29 - patient had BRBPR ~08:30 per nurse. GI consulted - no scope indicated at this time. Will switch to PPI from famotidine.    - H/H stable, will space CBC to daily  - PPI restarted 01/29

## 2020-01-29 NOTE — ASSESSMENT & PLAN NOTE
Patient normally MWF dialysis. Last full dialysis session prior to admission was Friday 01/17/20.   Nephrology following for HD management. Patient normally taking midodrine 5 mg 30 minutes prior to HD sessions at home. After initial episode of hypotension on HD, patient has tolerated dialysis sessions well.    - Continue to monitor for hypotension during HD sessions

## 2020-01-29 NOTE — PLAN OF CARE
CMICU DAILY GOALS       A: Awake    RASS: Goal - RASS Goal: 0-->alert and calm  Actual - RASS (Maddox Agitation-Sedation Scale): -1-->drowsy   Restraint necessity: Clinical Justification: Removing medical devices  B: Breath   SBT: fail  C: Coordinate A & B, analgesics/sedatives   Pain: managed    SAT: Pass  D: Delirium   CAM-ICU: Overall CAM-ICU: Positive  E: Early Mobility   MOVE Screen: Pass   Activity: Activity Management: bedrest maintained per order  FAS: Feeding/Nutrition   Diet order: Diet/Nutrition Received: NPO,   Fluid restriction:    T: Thrombus   DVT prophylaxis: VTE Required Core Measure: Pharmacological prophylaxis initiated/maintained  H: HOB Elevation   Head of Bed (HOB): HOB at 30 degrees  U: Ulcer Prophylaxis   GI: yes  G: Glucose control   managed Glycemic Management: blood glucose monitoring  S: Skin   Bundle compliance: yes   Bathing/Skin Care: bath, chlorhexidine, bath, complete, dressed/undressed, incontinence care, linen changed Date: [unfilled]  B: Bowel Function   diarrhea   I: Indwelling Catheters   Arcos necessity: [REMOVED]      Urethral Catheter 01/20/20 1950 Straight-tip 16 Fr.-Reason for Continuing Urinary Catheterization: Critically ill in ICU requiring intensive monitoring   CVC necessity: No   IPAD offered: Not appropriate  D: De-escalation Antibx   No  Plan for the day   Monitor H&H, repeat SBT tomorrow   Family/Goals of care/Code Status   Code Status: Full Code     No acute events throughout day, VS and assessment per flow sheet, patient progressing towards goals as tolerated, plan of care reviewed with Vaughn Retana and family, all concerns addressed, will continue to monitor.

## 2020-01-29 NOTE — PROGRESS NOTES
Medical device related pressure injury identified on HAPI prevention rounds. Gibson score is a 11. Prevention measures in place .   Noted superficial partial thickness ulceration to right nare from NG tube.   Skin prep applied to site. NG tube attachment device (TAD) in use to offload.   Reported finding to Ferny Antonio NP and she agrees with skin prep to site.     01/28/20 1800        Pressure Injury 01/28/20 1800 Right medial Nare Mucosal Membrane   Date First Assessed/Time First Assessed: 01/28/20 1800   Pressure Injury Present on Admission: suspected hospital acquired  Side: Right  Orientation: medial  Location: Nare  Is this injury device related?: Yes  Staging: Mucosal Membrane   Wound Image    Staging Mucosal Membrane   Dressing Appearance Open to air;No dressing   Drainage Amount None   Drainage Characteristics/Odor No odor   Appearance Pink   Tissue loss description Partial thickness   Periwound Area Intact   Wound Edges Open   Wound Length (cm) 0.5 cm   Wound Width (cm) 0.3 cm   Wound Depth (cm) 0.1 cm   Wound Volume (cm^3) 0.02 cm^3   Wound Surface Area (cm^2) 0.15 cm^2   Care Cleansed with:;Sterile normal saline;Applied:;Skin Barrier     Live Lujan RN CWON  [id number]

## 2020-01-29 NOTE — PLAN OF CARE
CMICU DAILY GOALS       A: Awake    RASS: Goal - RASS Goal: 0-->alert and calm  Actual - RASS (Maddox Agitation-Sedation Scale): -1-->drowsy   Restraint necessity: Clinical Justification: Removing medical devices  B: Breath   SBT: Not attempted   C: Coordinate A & B, analgesics/sedatives   Pain: managed    SAT: Not attempted  D: Delirium   CAM-ICU: Overall CAM-ICU: Positive  E: Early Mobility   MOVE Screen: Pass   Activity: Activity Management: activity adjusted per tolerance, activity clustered for rest period  FAS: Feeding/Nutrition   Diet order: Diet/Nutrition Received: NPO,   Fluid restriction:    T: Thrombus   DVT prophylaxis: VTE Required Core Measure: (SCDs) Sequential compression device initiated/maintained  H: HOB Elevation   Head of Bed (HOB): HOB at 30-45 degrees  U: Ulcer Prophylaxis   GI: yes  G: Glucose control   managed Glycemic Management: blood glucose monitoring  S: Skin   Bundle compliance: yes   Bathing/Skin Care: bath, chlorhexidine, bath, complete, dressed/undressed, incontinence care, linen changed Date: 1/28/2020, Night Shift  B: Bowel Function   no issues   I: Indwelling Catheters   Arcos necessity: [REMOVED]      Urethral Catheter 01/20/20 1950 Straight-tip 16 Fr.-Reason for Continuing Urinary Catheterization: Critically ill in ICU requiring intensive monitoring   CVC necessity: No   IPAD offered: Not appropriate  D: De-escalation Antibx   Yes  Plan for the day   Continue to monitor neuro status, look for s/s of seizures, possible SAT/SBT in am.  Family/Goals of care/Code Status   Code Status: Full Code     No acute events throughout day, VS and assessment per flow sheet, patient progressing towards goals as tolerated, plan of care reviewed with Vaughn Retana and family, all concerns addressed, will continue to monitor.

## 2020-01-30 NOTE — PLAN OF CARE
CMICU DAILY GOALS       A: Awake    RASS: Goal - RASS Goal: 0-->alert and calm  Actual - RASS (Maddox Agitation-Sedation Scale): -1-->drowsy   Restraint necessity: Clinical Justification: Removing medical devices  B: Breath   SBT: Not attempted   C: Coordinate A & B, analgesics/sedatives   Pain: managed    SAT: Not attempted  D: Delirium   CAM-ICU: Overall CAM-ICU: Positive  E: Early Mobility   MOVE Screen: Pass   Activity: Activity Management: activity adjusted per tolerance, activity clustered for rest period  FAS: Feeding/Nutrition   Diet order: Diet/Nutrition Received: NPO,   Fluid restriction:    T: Thrombus   DVT prophylaxis: VTE Required Core Measure: (SCDs) Sequential compression device initiated/maintained  H: HOB Elevation   Head of Bed (HOB): HOB at 30-45 degrees  U: Ulcer Prophylaxis   GI: yes  G: Glucose control   managed Glycemic Management: blood glucose monitoring  S: Skin   Bundle compliance: yes   Bathing/Skin Care: bath, chlorhexidine, bath, complete, dressed/undressed, incontinence care, linen changed Date: 1/29/2020. Day Shift  B: Bowel Function   no issues   I: Indwelling Catheters   Arcos necessity: [REMOVED]      Urethral Catheter 01/20/20 1950 Straight-tip 16 Fr.-Reason for Continuing Urinary Catheterization: Critically ill in ICU requiring intensive monitoring   CVC necessity: No   IPAD offered: Not appropriate  D: De-escalation Antibx   Yes  Plan for the day   Plan for SBT today, continue to monitor s/s of bleeding.  Family/Goals of care/Code Status   Code Status: Full Code     No acute events throughout day, VS and assessment per flow sheet, patient progressing towards goals as tolerated, plan of care reviewed with Vaughn Retana and family, all concerns addressed, will continue to monitor.

## 2020-01-30 NOTE — SUBJECTIVE & OBJECTIVE
Interval History/Significant Events:   NAEON. Off all sedation other than AED. Intubated, PEEP 5 and FiO2 21%. Some neurologic improvement - opening eyes spontaneously and occasionally tracking.        Review of Systems   Unable to perform ROS: Acuity of condition     Objective:     Vital Signs (Most Recent):  Temp: 98.1 °F (36.7 °C) (01/30/20 1100)  Pulse: 87 (01/30/20 1300)  Resp: (!) 25 (01/30/20 1300)  BP: (!) 122/51 (01/30/20 1300)  SpO2: 97 % (01/30/20 1300) Vital Signs (24h Range):  Temp:  [97.5 °F (36.4 °C)-98.6 °F (37 °C)] 98.1 °F (36.7 °C)  Pulse:  [] 87  Resp:  [9-25] 25  SpO2:  [97 %-100 %] 97 %  BP: ()/(46-96) 122/51   Weight: 57 kg (125 lb 10.6 oz)  Body mass index is 20.28 kg/m².      Intake/Output Summary (Last 24 hours) at 1/30/2020 1330  Last data filed at 1/30/2020 1200  Gross per 24 hour   Intake 602.5 ml   Output 2100 ml   Net -1497.5 ml       Physical Exam   Constitutional: No distress.   HENT:   Head: Normocephalic and atraumatic.   Mouth/Throat: No oropharyngeal exudate.   Eyes: Pupils are equal, round, and reactive to light.   Neck: No JVD present.   Cardiovascular: Normal rate, regular rhythm and normal heart sounds.   No murmur heard.  Pulmonary/Chest: Effort normal. No respiratory distress. He has no wheezes. He has rales (minimal inspiratory rales in bases).   Breathing comfortably, though creating copious secretions with occasional gurgling before suction   Abdominal: Soft. He exhibits no distension. There is no tenderness.   Musculoskeletal:   Left below knee amputation   Neurological: He is alert.   PERRL. Alert, oriented to person and place though not time. Following commands. Generalized weakness, notably weak cough. Moving all limbs on command.   Skin: Skin is warm and dry. Capillary refill takes less than 2 seconds. He is not diaphoretic.   Vitals reviewed.      Vents:  Vent Mode: Spont (01/30/20 1205)  Ventilator Initiated: Yes (01/20/20 1327)  Set Rate: 12 BPM  (01/30/20 0739)  Vt Set: 340 mL (01/24/20 1403)  Pressure Support: 7 cmH20 (01/30/20 1205)  PEEP/CPAP: 5 cmH20 (01/30/20 1205)  Oxygen Concentration (%): 21 (01/30/20 1205)  Peak Airway Pressure: 12 cmH2O (01/30/20 1205)  Plateau Pressure: 17 cmH20 (01/30/20 1205)  Total Ve: 5.67 mL (01/30/20 1205)  F/VT Ratio<105 (RSBI): (!) 33.33 (01/30/20 1205)  Lines/Drains/Airways     Drain                 Hemodialysis AV Fistula Right upper arm -- days         Hemodialysis AV Fistula Right upper arm -- days         NG/OG Tube 01/20/20 1932 Ambler sump Right nostril 9 days          Peripheral Intravenous Line                 Peripheral IV - Single Lumen 01/28/20 0905 18 G;1 3/4 in Left Upper Arm 2 days         Peripheral IV - Single Lumen 01/28/20 1330 20 G Left Antecubital 2 days              Significant Labs:    CBC/Anemia Profile:  Recent Labs   Lab 01/29/20  1801 01/29/20  2353 01/30/20  0604   WBC 8.95 8.90 8.89   HGB 12.8* 10.7* 12.3*   HCT 40.0 34.1* 39.4*    299 306   MCV 88 89 89   RDW 15.9* 15.9* 16.0*        Chemistries:  Recent Labs   Lab 01/29/20  0415 01/30/20  0259    140   K 4.1 4.1    102   CO2 21* 18*   BUN 81* 43*   CREATININE 8.7* 5.5*   CALCIUM 10.1 10.4   ALBUMIN 2.4* 2.6*   PROT 8.4 8.9*   BILITOT 0.4 0.5   ALKPHOS 208* 219*   ALT 37 34   AST 36 33   MG 2.8* 2.4   PHOS 4.5 3.6       All pertinent labs within the past 24 hours have been reviewed.    Significant Imaging:  I have reviewed all pertinent imaging results/findings within the past 24 hours.

## 2020-01-30 NOTE — ASSESSMENT & PLAN NOTE
The patient is a 55 yo AAM admitted to MICU with seizure activity after presenting to the ED on 1/20 after being found with alter mental status at his NH facility Monday morning. The patient is currently on continuous EEG monitoring. Last dialyze on MWF.     Nephrology History  iHD Schedule: MWF  Unit/MD: [facility]/ Dr. Lemus  Duration: 3.5 hrs  EDW: ?  Access: [facility]   Resodial Renal Function: Yes (per records)    Plan:   - No urgent need for dialysis today.   - Daily renal function panels   - Renal diet or Novasource TF   - On mechanical ventilation with FiO at 21%  - Continue to monitor intake and output, daily weights   - Avoid nephrotoxic medication and renal dose medications to GFR  - Will discuss with

## 2020-01-30 NOTE — ASSESSMENT & PLAN NOTE
Altered mental status    Patient initially presented to ED prior to getting dialysis due to AMS and brown emesis. Had witnessed tonic clonic seizure requiring ativan shortly after getting a CT head. He was loaded with 1500 mg Keppra, intubated for airway protection, and started on propofol for sedation. Upon physical exam, patient remained unresponsive to verbal or tactile stimuli and had upward left sided gaze with sluggish pupils. Concern for status epilepticus. Differential diagnosis includes polysubstance, sepsis, intracranial abnormalities, and PRES. 01/29 mental status continues to slowly improve daily.    - Spot EEG without evidence of current seizure. However patient already received keppra, ativan, and sedated on propofol. 24 hour EEG with L sided structural lesion and underlying epileptiform potential. Initiated 1500 mg keppra on 01/22, per Neurology recs.  - Substance induced: UDS negative, alcohol level wnl  - Sepsis: Initial lactate 2.4, likely due to seizure event; repeat was 1.9. Patient received 500 ml saline in ED. Initial blood cultures, sputum culture+gram stain neg  Lumbar puncture done, CSF not convincing for meningitis, so ampicillin, vancomycin, rocephin d/c'd on 01/22. However, pt was febrile on 11/23 to 101.3. Blood cultures repeated and restarted on Vanc and Zosyn. Repeat cultures NGTD. HSV PCR neg; Acyclovir discontinued. Prt has remained afebrile for > 72 hours. Vanc and Zosyn discontinued 01/26.   - Intracranial: patient with history of ICH with no functional deficits. Possibly multiple foci for seizure overtime. CT without acute changes, MRI brain with chronic changes and hypertensive angiopathy; no acute changes.  - PRES: Patient off cardene drip. MRI reviewed. Will follow blood pressure as patient is normally hypotensive.   - Neurology would like to repeat EEG to ensure that pt is not having any seizures. Finishing 15:30 on 01/27. Diffuse slowing, though no focal activity seen on EEG.  01/28 patient's mental status is continuing to improve day to day, now following commands. Passed SBT 01/28. Plan for extubation 01/29 deferred in light of suspected LGIB. Keppra brought back to 500 mg bid. 01/30 Patient is alert and following commands. Passed SBT and extubated ~12:00 pm. Breathing well though with generalized weakness as well as weak cough. PT/OT/SLP ordered

## 2020-01-30 NOTE — PROGRESS NOTES
HD completed , blood returned and needles pulled. Post bleeding  Time < 5 minutes. Net uf removed 1500ml. Post B/P 166/70 pulse 90. , patient remains on vent . Patient responsive to verbal commands

## 2020-01-30 NOTE — CARE UPDATE
Patient extubated per MD order @1205 to NC 2 lpm. Patient tolerated extubation well and will continue to monitor.

## 2020-01-30 NOTE — PROGRESS NOTES
[facility]  Critical Care Medicine  Progress Note    Patient Name: Vaughn Retana  MRN: [id number]  Admission Date: 1/20/2020  Hospital Length of Stay: 10 days  Code Status: Full Code  Attending Provider: Ricky Morgan MD  Primary Care Provider: Cori Randall MD   Principal Problem: Seizure    Subjective:     HPI:  Patient is a 54 year old male with extensive medical history including ESRD on dialysis MWF (has not received it today), L below knee amputation 2/2 osteomyelitis, dCHF (last echo 8/2/19 [facility]), GERD, h/o multiple ICH w/o residual deficits, and multiple instances of GI bleed (last EGD 11/12/19, esophagitis) who presents to ED [facility] [place] due to altered mental status. From NH report, nursing home staff went to wake the patient for his morning dialysis. He was confused, lethargic, had a moderate amount of brown colored emesis in his trash can. Patient last got dialysis on Friday. Patient is normally able to ambulate on his own and is alert and oriented; nursing staff reports that he appeared normal yesterday.       At the time of ICU consult, initial work up showed largely unremarkable cbc with no leukocytosis. CMP significant for a bicarb of 36; patient is ESRD with creatinine of 10.8. Patient still makes some urine, and UA was without evidence for UTI. Lactic acid acid was mildly elevated at 2.4 and troponin mildly elevated at .348->.339. INR is at 1.2. Alcohol and drug screen negative. Patient was given versed prior to undergoing CT scan; which showed no acute intraparenchymal hemorrhage or major vascular distribution, senescent changes, and remote lacunar type basal ganglia infarct. Shortly after CT, patient had a witnessed tonic clonic seizure. He received 2 mg of ativan and 1500 of keppra. Patient became non-responsive afterwards and was severely hypertensive with systolic bp in the 200's. Patient was intubated for airway protection and started on propofol and  "Cardene drip for hypertension. Family updated over the phone and at bedside.                Hospital/ICU Course:  Patient admitted to critical care medicine on 1/20 with concerns of new onset seizures and s/p intubation for airway protection. Patient was started on vanc, rocephin, ampicillin, and acyclovir to cover for meningitis. Patient had spot EEG while on propofol in ED which did not show seizure activity. MRI done showed chronic changes but no acute abnormalities. Patient was taken off cardene drip soon after he arrived in ICU. Lumbar puncture was done after MRI. CSF labs were not convincing for bacterial or viral meningitis; although cultures are pending. Continuous EEG was done after propofol was stopped which showed epileptiform discharges. Per epilepsy, patient was given another dose of Keppra, will follow up after checking levels as patient is ESRD. Nephrology consulted and started HD. Pt became febrile the night of 01/22; blood cultures repeated and restarted on Vanc and Zosyn. HSV PCR neg so Acyclovir discontinued. Neurology would like SBP < 160; initiated home Carvedilol. Pt became hypotensive with HD on 1/24 so unable to remove fluid off during dialysis. BP trending back up and stable. Neurology will monitor patient again with EEG for seizures. 01/27 Patient's neuro exam is somewhat improved from day prior per nurse - opening eyes spontaneously and occasionally tracking movements, blinking to threat. Received dialysis today. EEG to end 15:30, will f/u Neuro recs regarding continuing Keppra or discontinuing. 01/28 passed SBT today, plan to extubate 01/29. Repeat 24 hr EEG shows "multifocal slowing consistent with multiple areas of focal cortical dysfunction and occasional epileptiform discharges in the left frontotemporal region consistent with a potential seizure focus in this area" with no electrographic seizures. 01/29 Originally planned for extubation today, but will defer in light of BRBPR ~08:30 " per nurse. GI consulted, no scope indicated at this time. Will restart PPI. Otherwise mental status improving in small increments daily. Keppra decreased to 500 mg bid. 01/30 Patient successfully extubated, though very weak. Unable to clear secretions effectively at this time, weak cough. AOx2, following commands and responding to questions appropriately. Wound care consulted for 2 areas of ulceration on back of head.     Interval History/Significant Events:   NAEON. Off all sedation other than AED. Intubated, PEEP 5 and FiO2 21%. Some neurologic improvement - opening eyes spontaneously and occasionally tracking.        Review of Systems   Unable to perform ROS: Acuity of condition     Objective:     Vital Signs (Most Recent):  Temp: 98.1 °F (36.7 °C) (01/30/20 1100)  Pulse: 87 (01/30/20 1300)  Resp: (!) 25 (01/30/20 1300)  BP: (!) 122/51 (01/30/20 1300)  SpO2: 97 % (01/30/20 1300) Vital Signs (24h Range):  Temp:  [97.5 °F (36.4 °C)-98.6 °F (37 °C)] 98.1 °F (36.7 °C)  Pulse:  [] 87  Resp:  [9-25] 25  SpO2:  [97 %-100 %] 97 %  BP: ()/(46-96) 122/51   Weight: 57 kg (125 lb 10.6 oz)  Body mass index is 20.28 kg/m².      Intake/Output Summary (Last 24 hours) at 1/30/2020 1330  Last data filed at 1/30/2020 1200  Gross per 24 hour   Intake 602.5 ml   Output 2100 ml   Net -1497.5 ml       Physical Exam   Constitutional: No distress.   HENT:   Head: Normocephalic and atraumatic.   Mouth/Throat: No oropharyngeal exudate.   Eyes: Pupils are equal, round, and reactive to light.   Neck: No JVD present.   Cardiovascular: Normal rate, regular rhythm and normal heart sounds.   No murmur heard.  Pulmonary/Chest: Effort normal. No respiratory distress. He has no wheezes. He has rales (minimal inspiratory rales in bases).   Breathing comfortably, though creating copious secretions with occasional gurgling before suction   Abdominal: Soft. He exhibits no distension. There is no tenderness.   Musculoskeletal:   Left below  knee amputation   Neurological: He is alert.   PERRL. Alert, oriented to person and place though not time. Following commands. Generalized weakness, notably weak cough. Moving all limbs on command.   Skin: Skin is warm and dry. Capillary refill takes less than 2 seconds. He is not diaphoretic.   Vitals reviewed.      Vents:  Vent Mode: Spont (01/30/20 1205)  Ventilator Initiated: Yes (01/20/20 1327)  Set Rate: 12 BPM (01/30/20 0739)  Vt Set: 340 mL (01/24/20 1403)  Pressure Support: 7 cmH20 (01/30/20 1205)  PEEP/CPAP: 5 cmH20 (01/30/20 1205)  Oxygen Concentration (%): 21 (01/30/20 1205)  Peak Airway Pressure: 12 cmH2O (01/30/20 1205)  Plateau Pressure: 17 cmH20 (01/30/20 1205)  Total Ve: 5.67 mL (01/30/20 1205)  F/VT Ratio<105 (RSBI): (!) 33.33 (01/30/20 1205)  Lines/Drains/Airways     Drain                 Hemodialysis AV Fistula Right upper arm -- days         Hemodialysis AV Fistula Right upper arm -- days         NG/OG Tube 01/20/20 1932 Delcambre sump Right nostril 9 days          Peripheral Intravenous Line                 Peripheral IV - Single Lumen 01/28/20 0905 18 G;1 3/4 in Left Upper Arm 2 days         Peripheral IV - Single Lumen 01/28/20 1330 20 G Left Antecubital 2 days              Significant Labs:    CBC/Anemia Profile:  Recent Labs   Lab 01/29/20  1801 01/29/20  2353 01/30/20  0604   WBC 8.95 8.90 8.89   HGB 12.8* 10.7* 12.3*   HCT 40.0 34.1* 39.4*    299 306   MCV 88 89 89   RDW 15.9* 15.9* 16.0*        Chemistries:  Recent Labs   Lab 01/29/20  0415 01/30/20  0259    140   K 4.1 4.1    102   CO2 21* 18*   BUN 81* 43*   CREATININE 8.7* 5.5*   CALCIUM 10.1 10.4   ALBUMIN 2.4* 2.6*   PROT 8.4 8.9*   BILITOT 0.4 0.5   ALKPHOS 208* 219*   ALT 37 34   AST 36 33   MG 2.8* 2.4   PHOS 4.5 3.6       All pertinent labs within the past 24 hours have been reviewed.    Significant Imaging:  I have reviewed all pertinent imaging results/findings within the past 24 hours.      ABG  Recent Labs    Lab 01/30/20  1157   PH 7.397   PO2 109*   PCO2 34.4*   HCO3 21.2*   BE -4     Assessment/Plan:     Neuro  * Seizure  Altered mental status    Patient initially presented to ED prior to getting dialysis due to AMS and brown emesis. Had witnessed tonic clonic seizure requiring ativan shortly after getting a CT head. He was loaded with 1500 mg Keppra, intubated for airway protection, and started on propofol for sedation. Upon physical exam, patient remained unresponsive to verbal or tactile stimuli and had upward left sided gaze with sluggish pupils. Concern for status epilepticus. Differential diagnosis includes polysubstance, sepsis, intracranial abnormalities, and PRES. 01/29 mental status continues to slowly improve daily.    - Spot EEG without evidence of current seizure. However patient already received keppra, ativan, and sedated on propofol. 24 hour EEG with L sided structural lesion and underlying epileptiform potential. Initiated 1500 mg keppra on 01/22, per Neurology recs.  - Substance induced: UDS negative, alcohol level wnl  - Sepsis: Initial lactate 2.4, likely due to seizure event; repeat was 1.9. Patient received 500 ml saline in ED. Initial blood cultures, sputum culture+gram stain neg  Lumbar puncture done, CSF not convincing for meningitis, so ampicillin, vancomycin, rocephin d/c'd on 01/22. However, pt was febrile on 11/23 to 101.3. Blood cultures repeated and restarted on Vanc and Zosyn. Repeat cultures NGTD. HSV PCR neg; Acyclovir discontinued. Prt has remained afebrile for > 72 hours. Vanc and Zosyn discontinued 01/26.   - Intracranial: patient with history of ICH with no functional deficits. Possibly multiple foci for seizure overtime. CT without acute changes, MRI brain with chronic changes and hypertensive angiopathy; no acute changes.  - PRES: Patient off cardene drip. MRI reviewed. Will follow blood pressure as patient is normally hypotensive.   - Neurology would like to repeat EEG to  ensure that pt is not having any seizures. Finishing 15:30 on 01/27. Diffuse slowing, though no focal activity seen on EEG. 01/28 patient's mental status is continuing to improve day to day, now following commands. Passed SBT 01/28. Plan for extubation 01/29 deferred in light of suspected LGIB. Keppra brought back to 500 mg bid. 01/30 Patient is alert and following commands. Passed SBT and extubated ~12:00 pm. Breathing well though with generalized weakness as well as weak cough. PT/OT/SLP ordered    Cardiac/Vascular  Renovascular hypertension  Patient on coreg at nursing home; has been compliant as given by nursing home. Patient with hypertensive emergency on admit and started Cardene drip to titrate to BP of 160-180. Off Cardene drip since 01/20. Goal SBP < 160 per Neurology. Pt started on home Carvedilol on 01/24; BP well controlled.     -- continue home carvedilol 3.125 mg BID    Chronic diastolic heart failure  Last echo done at [facility] 8/2/19, EF>55%, bi-atrial enlargement      Renal/  ESRD on hemodialysis  Patient normally MWF dialysis. Last full dialysis session prior to admission was Friday 01/17/20.   Nephrology following for HD management. Patient normally taking midodrine 5 mg 30 minutes prior to HD sessions at home. After initial episode of hypotension on HD, patient has tolerated dialysis sessions well.    - Continue to monitor for hypotension during HD sessions  - Home midodrine 5 mg 30 minutes prior to dialysis sessions    Endocrine  Severe malnutrition  Nutrition consulted  [facility] to provide 1440 kcals, 65 g of protein, 516 mL fluid.     - Continue TFs    GI  Gastrointestinal hemorrhage with hematemesis  GERD with esophagitis    Patient with reports of coffee ground emesis prior to transport to ED. In ED, dark brown contents suctioned from stomach. Hemoglobin remains stable at 15 and patient is hypertensive. Patient is well known to GI. His most recent scope was in November that just  showed esophagitis similar to his previous EGD. GI consulted and appreciate recommendations. Currently on 40 mg BID. 01/29 - patient had BRBPR ~08:30 per nurse. GI consulted - no scope indicated at this time. Will switch to PPI from famotidine.    - H/H stable, will space CBC to daily  - PPI restarted 01/29       Critical Care Daily Checklist:    A: Awake: RASS Goal/Actual Goal: RASS Goal: 0-->alert and calm  Actual: Maddox Agitation Sedation Scale (RASS): Alert and calm   B: Spontaneous Breathing Trial Performed? Spon. Breathing Trial Initiated?: Initiated (01/30/20 1205)   C: SAT & SBT Coordinated?  yes                      D: Delirium: CAM-ICU Overall CAM-ICU: Negative   E: Early Mobility Performed? Yes   F: Feeding Goal: Goals: Meet % EEN, EPN  Status: Nutrition Goal Status: goal met   Current Diet Order   No orders of the defined types were placed in this encounter.      AS: Analgesia/Sedation n/a   T: Thromboembolic Prophylaxis Heparin   H: HOB > 300 Yes   U: Stress Ulcer Prophylaxis (if needed) PPI   G: Glucose Control managing   B: Bowel Function Stool Occurrence: 1   I: Indwelling Catheter (Lines & Arcos) Necessity PIV x2   NGT     D: De-escalation of Antimicrobials/Pharmacotherapies     Plan for the day/ETD Extubate  Continue to monitor neurologic status for improvement    Code Status:  Family/Goals of Care: Full Code         Critical secondary to Patient has an abrupt change in neurologic status: Likely post-ictal encephalopathy      Critical care was time spent personally by me on the following activities: development of treatment plan with patient or surrogate and bedside caregivers, discussions with consultants, evaluation of patient's response to treatment, examination of patient, ordering and performing treatments and interventions, ordering and review of laboratory studies, ordering and review of radiographic studies, pulse oximetry, re-evaluation of patient's condition. This critical care  time did not overlap with that of any other provider or involve time for any procedures.     Yimi Taylor MD  Critical Care Medicine  [facility]

## 2020-01-30 NOTE — PT/OT/SLP PROGRESS
Occupational Therapy      Patient Name:  Vaughn Retana   MRN:  [id number]    Patient Passed MOVE screen but on SBT this AM. OT unable to return this date and will check status at later date.       WILBUR Hansen  1/30/2020

## 2020-01-30 NOTE — ASSESSMENT & PLAN NOTE
Last echo done at [facility] 8/2/19, EF>55%, bi-atrial enlargement

## 2020-01-30 NOTE — TREATMENT PLAN
[facility]  Gastroenterology Treatment Plan        Objective:     Vitals:    10/11/19 0622   BP: 110/60   Pulse:    Resp:    Temp:              Significant Labs:  Recent Labs   Lab 01/29/20  1801 01/29/20  2353 01/30/20  0604   HGB 12.8* 10.7* 12.3*       Lab Results   Component Value Date    WBC 8.89 01/30/2020    HGB 12.3 (L) 01/30/2020    HCT 39.4 (L) 01/30/2020    MCV 89 01/30/2020     01/30/2020       Lab Results   Component Value Date     01/30/2020    K 4.1 01/30/2020     01/30/2020    CO2 18 (L) 01/30/2020    BUN 43 (H) 01/30/2020    CREATININE 5.5 (H) 01/30/2020    CALCIUM 10.4 01/30/2020    ANIONGAP 20 (H) 01/30/2020    ESTGFRAFRICA 12.4 (A) 01/30/2020    EGFRNONAA 10.7 (A) 01/30/2020       Lab Results   Component Value Date    ALT 34 01/30/2020    AST 33 01/30/2020    ALKPHOS 219 (H) 01/30/2020    BILITOT 0.5 01/30/2020       Lab Results   Component Value Date    INR 1.2 01/20/2020    INR 1.0 11/22/2019    INR 1.1 11/11/2019           Subjective:     Intubated and sedated, examined stool.     Assessment/Plan:   History of gastroparesis and severe esophagitis, multiple scopes showing esophagitis. We saw him initially when admitted on 1/20 for possbile UGIB, no significant drop in hb, currently intubated and sedated in ICU. Called today for BRBPR happened around 8AM hb 11.3 no change from hb obtained at 3AM 11.2. . Examined stool personally myself, bright red, no rectal tube in place, had colonoscopy in 2017 had polyps.  No further bleeding per nursing after that. HR normal, normal BUN to cr ratio, negative for blood on NG to suction.     1/30/2020: no further overt bleed, hb stable.     Problem List:  1. Hematochezia, resolved, hb stable.   2. Grade D esophagitis  3. No signs of UGIB.     Plan:  -Place 2 large bore IVs, volume resuscitation per primary  -Transfuse pRBC for Hb < 7 g/dL (Consider a higher Hb target if there is clinical evidence of intravascular volume  depletion or comorbidities, such as CAD or if high suspicion of vigorous active ongoing bleeding or an uncorrected coagulopathy exists.).  -Correct coagulopathy (goal plt >50, INR <1.5) if present in patients without absolute contraindications.  - IV PPI 40 BID for esophagitis  -Avoid nonsteroidal agents, antiplatelet agents and anticoagulants if possible in patients without absolute contraindications  -no plans for endoscopy at this time. Call if further episodes of bleeding or significant drop in hb.   -Please call with any additional questions, concerns or changes in the patient's clinical status.    We will sign off. Thank you for involving us in the care of Vaughn Retana. Please call with any additional questions, concerns or changes in the patient's clinical status.    Sotero Ojeda MD  Gastroenterology Fellow PGY IV   [facility]

## 2020-01-30 NOTE — SUBJECTIVE & OBJECTIVE
Interval History: HD completed yesterday. Net UF 1.5 L. No electrolyte derangements noted.   Patient remains intubated, appears more alert on AM assessment, nodding appropriately to yes/no questions. Passed SBT yesterday.     Review of patient's allergies indicates:   Allergen Reactions    Fosrenol [lanthanum] Nausea And Vomiting     Nausea and vomiting     Current Facility-Administered Medications   Medication Frequency    0.9%  NaCl infusion Once    acetaminophen tablet 650 mg Q6H PRN    carvedilol tablet 3.125 mg BID    chlorhexidine 0.12 % solution 15 mL BID    dextrose 10% (D10W) Bolus PRN    glucagon (human recombinant) injection 1 mg PRN    levETIRAcetam in NaCl (iso-os) IVPB 500 mg Q12H    pantoprazole injection 40 mg Q12H    sevelamer carbonate pwpk 1.6 g TID    sodium chloride 0.9% flush 10 mL PRN       Objective:     Vital Signs (Most Recent):  Temp: 98.6 °F (37 °C) (01/30/20 0715)  Pulse: 99 (01/30/20 0800)  Resp: 18 (01/30/20 0800)  BP: (!) 99/53 (01/30/20 0800)  SpO2: 98 % (01/30/20 0800)  O2 Device (Oxygen Therapy): ventilator (01/30/20 0739) Vital Signs (24h Range):  Temp:  [97.5 °F (36.4 °C)-98.6 °F (37 °C)] 98.6 °F (37 °C)  Pulse:  [] 99  Resp:  [7-21] 18  SpO2:  [97 %-100 %] 98 %  BP: ()/(46-96) 99/53     Weight: 57 kg (125 lb 10.6 oz) (01/29/20 1200)  Body mass index is 20.28 kg/m².  Body surface area is 1.63 meters squared.    I/O last 3 completed shifts:  In: 972.5 [I.V.:82.5; Other:300; NG/GT:290; IV Piggyback:300]  Out: 2100 [Other:2100]    Physical Exam   Constitutional: He appears well-nourished. He appears ill. He is intubated.   HENT:   Head: Normocephalic and atraumatic.   Mouth/Throat: No oropharyngeal exudate.   Eyes: Right eye exhibits no discharge. Left eye exhibits no discharge. No scleral icterus.   Neck: Normal range of motion. Neck supple. No JVD present.   Cardiovascular: Regular rhythm and normal heart sounds.   No murmur heard.  Pulmonary/Chest: He is  intubated. He has no rales.   Abdominal: Soft. Bowel sounds are normal. He exhibits no distension.   Musculoskeletal: Normal range of motion. He exhibits deformity (LBKA). He exhibits no edema.   Left below knee amputation   Neurological: He is alert.   Skin: Skin is warm and dry.   Vitals reviewed.      Significant Labs:  CBC:   Recent Labs   Lab 01/30/20  0604   WBC 8.89   RBC 4.43*   HGB 12.3*   HCT 39.4*      MCV 89   MCH 27.8   MCHC 31.2*     CMP:   Recent Labs   Lab 01/30/20  0259   GLU 72   CALCIUM 10.4   ALBUMIN 2.6*   PROT 8.9*      K 4.1   CO2 18*      BUN 43*   CREATININE 5.5*   ALKPHOS 219*   ALT 34   AST 33   BILITOT 0.5

## 2020-01-30 NOTE — PT/OT/SLP PROGRESS
Physical Therapy      Patient Name:  Vaughn Retana   MRN:  [id number]    Patient not seen today secondary to (AM -pt on hold for SBT. PM -pt not seen 2nd to increased secretions and RN approved ther exer in bed only. PT will treat pt tomorrow and may be able to increase mobility. ). Will follow-up at a later date..    Odalis Baker, PT   1/30/2020

## 2020-01-30 NOTE — PROGRESS NOTES
Dr Morgan at bedside rounding. SBT trial initiated with ABG to follow and possible extubation. Respiratory therapist notified.

## 2020-01-30 NOTE — PT/OT/SLP PROGRESS
Speech Language Pathology      Vaughn EARLENE Mazin   [id number] A    MRN: [id number]    SLP orders received. Thank you. Patient not seen today secondary to Nursing hold (Comment)(Upon SLP attempt to evaluate at 1550, NSG reported verbal order received from medical team to defer SLP Clinical Evaluation of Swallow until tomorrow 2/2 recency s/p extubation. ). Will follow-up 1/31/20 if pt medically stable and available.     NALDO Holden, CCC-SLP  [phone]  1/30/2020

## 2020-01-30 NOTE — ASSESSMENT & PLAN NOTE
Patient normally MWF dialysis. Last full dialysis session prior to admission was Friday 01/17/20.   Nephrology following for HD management. Patient normally taking midodrine 5 mg 30 minutes prior to HD sessions at home. After initial episode of hypotension on HD, patient has tolerated dialysis sessions well.    - Continue to monitor for hypotension during HD sessions  - Home midodrine 5 mg 30 minutes prior to dialysis sessions

## 2020-01-31 PROBLEM — R47.01 APHASIA: Status: ACTIVE | Noted: 2020-01-01

## 2020-01-31 NOTE — ASSESSMENT & PLAN NOTE
Altered mental status     Patient initially presented to ED prior to getting dialysis due to AMS and brown emesis. Had witnessed tonic clonic seizure requiring ativan shortly after getting a CT head. He was loaded with 1500 mg Keppra, intubated for airway protection, and started on propofol for sedation. Upon physical exam, patient remained unresponsive to verbal or tactile stimuli and had upward left sided gaze with sluggish pupils. Concern for status epilepticus. Differential diagnosis includes polysubstance, sepsis, intracranial abnormalities, and PRES. 01/29 mental status continues to slowly improve daily.    - Spot EEG without evidence of current seizure. However patient already received keppra, ativan, and sedated on propofol. 24 hour EEG with L sided structural lesion and underlying epileptiform potential. Initiated 1500 mg keppra on 01/22, per Neurology recs.  - Substance induced: UDS negative, alcohol level wnl  - Sepsis: Initial lactate 2.4, likely due to seizure event; repeat was 1.9. Patient received 500 ml saline in ED. Initial blood cultures, sputum culture+gram stain neg  Lumbar puncture done, CSF not convincing for meningitis, so ampicillin, vancomycin, rocephin d/c'd on 01/22. However, pt was febrile on 11/23 to 101.3. Blood cultures repeated and restarted on Vanc and Zosyn. Repeat cultures NGTD. HSV PCR neg; Acyclovir discontinued. Prt has remained afebrile for > 72 hours. Vanc and Zosyn discontinued 01/26.   - Intracranial: patient with history of ICH with no functional deficits. Possibly multiple foci for seizure overtime. CT without acute changes, MRI brain with chronic changes and hypertensive angiopathy; no acute changes.  - PRES: Patient off cardene drip. MRI reviewed. Will follow blood pressure as patient is normally hypotensive.   - Neurology would like to repeat EEG to ensure that pt is not having any seizures. Finishing 15:30 on 01/27. Diffuse slowing, though no focal activity seen on  EEG. 01/28 patient's mental status is continuing to improve day to day, now following commands. Passed SBT 01/28. Plan for extubation 01/29 deferred in light of suspected LGIB. Keppra brought back to 500 mg bid. 01/30 Patient is alert and following commands. Passed SBT and extubated ~12:00 pm. Breathing well though with generalized weakness as well as weak cough. PT/OT/SLP ordered    - decreased keppra 500 daily due to weakness and somnolence

## 2020-01-31 NOTE — ASSESSMENT & PLAN NOTE
[place] [facility] [place], EF>55%, bi-atrial enlargement  No signes of exacerbation

## 2020-01-31 NOTE — PROGRESS NOTES
HD initiated, cannulated upper left arm AVF with 15 gauge needles . Good blood flow noted, /. uf goal set for 1 to 2  liters as tolerated .  Patient responsive to verbal commands

## 2020-01-31 NOTE — PROGRESS NOTES
[facility]  Critical Care Medicine  Progress Note    Patient Name: Vaughn Retana  MRN: [id number]  Admission Date: 1/20/2020  Hospital Length of Stay: 11 days  Code Status: Full Code  Attending Provider: Ricky Morgan MD  Primary Care Provider: Cori Randall MD   Principal Problem: Seizure    Subjective:     HPI:  Patient is a 54 year old male with extensive medical history including ESRD on dialysis MWF (has not received it today), L below knee amputation 2/2 osteomyelitis, [place] [facility] [place] G[place]D, h/o multiple ICH w/o residual deficits, and multiple instances of GI bleed (last EGD 11/12/19, esophagitis) who presents to ED [facility] due to altered mental status. From NH report, nursing home staff went to wake the patient for his morning dialysis. He was confused, lethargic, had a moderate amount of brown colored emesis in his trash can. Patient last got dialysis on Friday. Patient is normally able to ambulate on his own and is alert and oriented; nursing staff reports that he appeared normal yesterday.       At the time of ICU consult, initial work up showed largely unremarkable cbc with no leukocytosis. CMP significant for a bicarb of 36; patient is ESRD with creatinine of 10.8. Patient still makes some urine, and UA was without evidence for UTI. Lactic acid acid was mildly elevated at 2.4 and troponin mildly elevated at .348->.339. INR is at 1.2. Alcohol and drug screen negative. Patient was given versed prior to undergoing CT scan; which showed no acute intraparenchymal hemorrhage or major vascular distribution, senescent changes, and remote lacunar type basal ganglia infarct. Shortly after CT, patient had a witnessed tonic clonic seizure. He received 2 mg of ativan and 1500 of keppra. Patient became non-responsive afterwards and was severely hypertensive with systolic bp in the 200's. Patient was intubated for airway protection and started on propofol and  "Cardene drip for hypertension. Family updated over the phone and at bedside.                Hospital/ICU Course:  Patient admitted to critical care medicine on 1/20 with concerns of new onset seizures and s/p intubation for airway protection. Patient was started on vanc, rocephin, ampicillin, and acyclovir to cover for meningitis. Patient had spot EEG while on propofol in ED which did not show seizure activity. MRI done showed chronic changes but no acute abnormalities. Patient was taken off cardene drip soon after he arrived in ICU. Lumbar puncture was done after MRI. CSF labs were not convincing for bacterial or viral meningitis; although cultures are pending. Continuous EEG was done after propofol was stopped which showed epileptiform discharges. Per epilepsy, patient was given another dose of Keppra, will follow up after checking levels as patient is ESRD. Nephrology consulted and started HD. Pt became febrile the night of 01/22; blood cultures repeated and restarted on Vanc and Zosyn. HSV PCR neg so Acyclovir discontinued. Neurology would like SBP < 160; initiated home Carvedilol. Pt became hypotensive with HD on 1/24 so unable to remove fluid off during dialysis. BP trending back up and stable. Neurology will monitor patient again with EEG for seizures. 01/27 Patient's neuro exam is somewhat improved from day prior per nurse - opening eyes spontaneously and occasionally tracking movements, blinking to threat. Received dialysis today. EEG to end 15:30, will f/u Neuro recs regarding continuing Keppra or discontinuing. 01/28 passed SBT today, plan to extubate 01/29. Repeat 24 hr EEG shows "multifocal slowing consistent with multiple areas of focal cortical dysfunction and occasional epileptiform discharges in the left frontotemporal region consistent with a potential seizure focus in this area" with no electrographic seizures. 01/29 Originally planned for extubation today, but will defer in light of BRBPR ~08:30 " per nurse. GI consulted, no scope indicated at this time. Will restart PPI. Otherwise mental status improving in small increments daily. Keppra decreased to 500 mg bid. 01/30 Patient successfully extubated, though very weak. Unable to clear secretions effectively at this time, weak cough. AOx2, following commands and responding to questions appropriately. Wound care consulted for 2 areas of ulceration on back of head.     Interval History/Significant Events:   NAEON. Sever muscle weakness, protecting his airway, SLP eval and checking CK.    Review of Systems   Unable to perform ROS: Acuity of condition     Objective:     Vital Signs (Most Recent):  Temp: 98.4 °F (36.9 °C) (01/31/20 1100)  Pulse: 81 (01/31/20 1145)  Resp: 20 (01/31/20 1145)  BP: (!) 105/48 (01/31/20 1145)  SpO2: 99 % (01/31/20 1145) Vital Signs (24h Range):  Temp:  [97.7 °F (36.5 °C)-98.5 °F (36.9 °C)] 98.4 °F (36.9 °C)  Pulse:  [78-93] 81  Resp:  [13-25] 20  SpO2:  [93 %-100 %] 99 %  BP: ()/(46-91) 105/48   Weight: 57 kg (125 lb 10.6 oz)  Body mass index is 20.28 kg/m².      Intake/Output Summary (Last 24 hours) at 1/31/2020 1152  Last data filed at 1/31/2020 1122  Gross per 24 hour   Intake 1015 ml   Output 10 ml   Net 1005 ml       Physical Exam   Constitutional: No distress.   SOMNOLENT    HENT:   Head: Normocephalic and atraumatic.   Mouth/Throat: No oropharyngeal exudate.   Eyes: Pupils are equal, round, and reactive to light.   Neck: No JVD present.   Cardiovascular: Normal rate, regular rhythm and normal heart sounds.   No murmur heard.  Pulmonary/Chest: Effort normal. No respiratory distress. He has no wheezes. He has rales (minimal inspiratory rales in bases).   Breathing comfortably, though creating copious secretions with occasional gurgling before suction   Abdominal: Soft. He exhibits no distension. There is no tenderness.   Musculoskeletal:   Left below knee amputation   Neurological: He is alert.   PERRL. Alert. Following  commands. Generalized weakness, notably weak cough. Moving all limbs on command. Aphasic    Skin: Skin is warm and dry. Capillary refill takes less than 2 seconds. He is not diaphoretic.   Vitals reviewed.      Vents:  Vent Mode: Spont (01/30/20 1205)  Ventilator Initiated: Yes (01/20/20 1327)  Set Rate: 12 BPM (01/30/20 0739)  Vt Set: 340 mL (01/24/20 1403)  Pressure Support: 7 cmH20 (01/30/20 1205)  PEEP/CPAP: 5 cmH20 (01/30/20 1205)  Oxygen Concentration (%): 21 (01/30/20 1205)  Peak Airway Pressure: 12 cmH2O (01/30/20 1205)  Plateau Pressure: 17 cmH20 (01/30/20 1205)  Total Ve: 5.67 mL (01/30/20 1205)  F/VT Ratio<105 (RSBI): (!) 33.33 (01/30/20 1205)  Lines/Drains/Airways     Drain                 Hemodialysis AV Fistula Right upper arm -- days         Hemodialysis AV Fistula Right upper arm -- days         NG/OG Tube 01/20/20 1932 [place] sump Right nostril 10 days          Peripheral Intravenous Line                 Peripheral IV - Single Lumen 01/28/20 0905 18 G;1 3/4 in Left Upper Arm 3 days         Peripheral IV - Single Lumen 01/30/20 1910 22 G Anterior;Distal;Left Wrist less than 1 day              Significant Labs:    CBC/Anemia Profile:  Recent Labs   Lab 01/30/20  0604 01/30/20  2100 01/31/20  0303   WBC 8.89 9.03 7.28   HGB 12.3* 10.0* 11.2*   HCT 39.4* 32.2* 36.6*    361* 321   MCV 89 90 91   RDW 16.0* 16.0* 16.3*        Chemistries:  Recent Labs   Lab 01/30/20  0259 01/30/20  2100 01/31/20  0500    139 138   K 4.1 4.2 4.2    101 102   CO2 18* 17* 19*   BUN 43* 67* 72*   CREATININE 5.5* 7.6* 8.4*   CALCIUM 10.4 10.0 9.9   ALBUMIN 2.6* 2.6* 2.5*   PROT 8.9* 8.9* 8.5*   BILITOT 0.5 0.4 0.4   ALKPHOS 219* 231* 228*   ALT 34 31 28   AST 33 34 30   MG 2.4 2.6 2.7*   PHOS 3.6  --  6.0*       All pertinent labs within the past 24 hours have been reviewed.    Significant Imaging:  I have reviewed all pertinent imaging results/findings within the past 24 hours.      ABG  Recent Labs   Lab  01/30/20  1157   PH 7.397   PO2 109*   PCO2 34.4*   HCO3 21.2*   BE -4     Assessment/Plan:     Neuro  * Seizure  Altered mental status     Patient initially presented to ED prior to getting dialysis due to AMS and brown emesis. Had witnessed tonic clonic seizure requiring ativan shortly after getting a CT head. He was loaded with 1500 mg Keppra, intubated for airway protection, and started on propofol for sedation. Upon physical exam, patient remained unresponsive to verbal or tactile stimuli and had upward left sided gaze with sluggish pupils. Concern for status epilepticus. Differential diagnosis includes polysubstance, sepsis, intracranial abnormalities, and PRES. 01/29 mental status continues to slowly improve daily.    - Spot EEG without evidence of current seizure. However patient already received keppra, ativan, and sedated on propofol. 24 hour EEG with L sided structural lesion and underlying epileptiform potential. Initiated 1500 mg keppra on 01/22, per Neurology recs.  - Substance induced: UDS negative, alcohol level wnl  - Sepsis: Initial lactate 2.4, likely due to seizure event; repeat was 1.9. Patient received 500 ml saline in ED. Initial blood cultures, sputum culture+gram stain neg  Lumbar puncture done, CSF not convincing for meningitis, so ampicillin, vancomycin, rocephin d/c'd on 01/22. However, pt was febrile on 11/23 to 101.3. Blood cultures repeated and restarted on Vanc and Zosyn. Repeat cultures NGTD. HSV PCR neg; Acyclovir discontinued. Prt has remained afebrile for > 72 hours. Vanc and Zosyn discontinued 01/26.   - Intracranial: patient with history of ICH with no functional deficits. Possibly multiple foci for seizure overtime. CT without acute changes, MRI brain with chronic changes and hypertensive angiopathy; no acute changes.  - PRES: Patient off cardene drip. MRI reviewed. Will follow blood pressure as patient is normally hypotensive.   - Neurology would like to repeat EEG to ensure  that pt is not having any seizures. Finishing 15:30 on 01/27. Diffuse slowing, though no focal activity seen on EEG. 01/28 patient's mental status is continuing to improve day to day, now following commands. Passed SBT 01/28. Plan for extubation 01/29 deferred in light of suspected LGIB. Keppra brought back to 500 mg bid. 01/30 Patient is alert and following commands. Passed SBT and extubated ~12:00 pm. Breathing well though with generalized weakness as well as weak cough. PT/OT/SLP ordered    - decreased keppra 500 daily due to weakness and somnolence     Aphasia  CT head showed no acute changes  Patent follow commands but aphasic  Diffuse muscle weakness s.p extubation   Could be due to debility vs keppra vs patient baseline   Failed swallow eval   CK normal     - NPO  - Working on contacting the family to get more information about patient baseline   - decreasing keppra to 500 daily     Cardiac/Vascular  Renovascular hypertension  Patient on coreg at nursing home; has been compliant as given by nursing home. Patient with hypertensive emergency on admit and started Cardene drip to titrate to BP of 160-180. Off Cardene drip since 01/20. Goal SBP < 160 per Neurology. Pt started on home Carvedilol on 01/24; BP well controlled.     -- continue home carvedilol 3.125 mg BID    Chronic diastolic heart failure  [place] [facility] [place], EF>55%, bi-atrial enlargement  No signes of exacerbation       Endocrine  Severe malnutrition  Nutrition consulted  Novasource @ 30 mL/hr to provide 1440 kcals, 65 g of protein, 516 mL fluid.     - Continue TFs    GI  Gastrointestinal hemorrhage with hematemesis  GERD with esophagitis    Patient with reports of coffee ground emesis prior to transport to ED. In ED, dark brown contents suctioned from stomach. Hemoglobin remains stable at 15 and patient is hypertensive. Patient is well known to GI. His most recent scope was in November that just showed esophagitis similar to his previous  EGD. GI consulted and appreciate recommendations. Currently on 40 mg BID. 01/29 - patient had BRBPR ~08:30 per nurse. GI consulted - no scope indicated at this time. Will switch to PPI from famotidine.    - H/H stable, will space CBC to daily  - PPI restarted 01/29    GERD with esophagitis  Continue Protonix  Follow up with GI out patient           Critical Care Daily Checklist:     A: Awake: RASS Goal/Actual Goal: RASS Goal: 0-->alert and calm  Actual: Maddox Agitation Sedation Scale (RASS): Alert and calm   B: Spontaneous Breathing Trial Performed? Spon. Breathing Trial Initiated?: Initiated (01/30/20 1205)   C: SAT & SBT Coordinated?  yes                      D: Delirium: CAM-ICU Overall CAM-ICU: Negative   E: Early Mobility Performed? Yes   F: Feeding Goal: Goals: Meet % EEN, EPN  Status: Nutrition Goal Status: goal met   Current Diet Order   No orders of the defined types were placed in this encounter.       AS: Analgesia/Sedation n/a   T: Thromboembolic Prophylaxis Heparin   H: HOB > 300 Yes   U: Stress Ulcer Prophylaxis (if needed) PPI   G: Glucose Control managing   B: Bowel Function Stool Occurrence: 1   I: Indwelling Catheter (Lines & Arcos) Necessity PIV x2   NGT      D: De-escalation of Antimicrobials/Pharmacotherapies       Plan for the day/ETD   Continue to monitor neurologic status for improvement     Code Status:  Family/Goals of Care: Full Code              Critical secondary to seizure      Critical care was time spent personally by me on the following activities: development of treatment plan with patient or surrogate and bedside caregivers, discussions with consultants, evaluation of patient's response to treatment, examination of patient, ordering and performing treatments and interventions, ordering and review of laboratory studies, ordering and review of radiographic studies, pulse oximetry, re-evaluation of patient's condition. This critical care time did not overlap with that of any  other provider or involve time for any procedures.     ROLDAN Mabry  Critical Care Medicine  [facility]

## 2020-01-31 NOTE — PROGRESS NOTES
HD completed, blood returned and needles pulled. Net uf that patient could tolerated was 600 ml net . , post B/P 114/55 after blood returned . Post bleeding time < 6 minutes. . Pulse 84.

## 2020-01-31 NOTE — PROGRESS NOTES
[facility]  Nephrology  Progress Note    Patient Name: Vaughn Retana  MRN: [id number]  Admission Date: 1/20/2020  Hospital Length of Stay: 11 days  Attending Provider: Ricky Morgan MD   Primary Care Physician: Cori Randall MD  Principal Problem:Seizure    Subjective:     Interval History: Patient extubated on yesterday, patient appears comfortable on RA. Withdrawn on assessment, but nods appropriately to direct questions.     Review of patient's allergies indicates:   Allergen Reactions    Fosrenol [lanthanum] Nausea And Vomiting     Nausea and vomiting     Current Facility-Administered Medications   Medication Frequency    0.9%  NaCl infusion Once    acetaminophen tablet 650 mg Q6H PRN    carvedilol tablet 3.125 mg BID    dextrose 10% (D10W) Bolus PRN    glucagon (human recombinant) injection 1 mg PRN    heparin (porcine) injection 5,000 Units Q8H    levETIRAcetam in NaCl (iso-os) IVPB 500 mg Q12H    midodrine tablet 5 mg Every Mon, Wed, Fri    pantoprazole injection 40 mg Q12H    sevelamer carbonate pwpk 1.6 g TID    sodium chloride 0.9% flush 10 mL PRN       Objective:     Vital Signs (Most Recent):  Temp: 97.8 °F (36.6 °C) (01/31/20 0701)  Pulse: 83 (01/31/20 0800)  Resp: (!) 21 (01/31/20 0800)  BP: (!) 151/67 (01/31/20 0800)  SpO2: 100 % (01/31/20 0800)  O2 Device (Oxygen Therapy): room air (01/31/20 0800) Vital Signs (24h Range):  Temp:  [97.7 °F (36.5 °C)-98.5 °F (36.9 °C)] 97.8 °F (36.6 °C)  Pulse:  [80-93] 83  Resp:  [13-25] 21  SpO2:  [93 %-100 %] 100 %  BP: ()/(46-91) 151/67     Weight: 57 kg (125 lb 10.6 oz) (01/29/20 1200)  Body mass index is 20.28 kg/m².  Body surface area is 1.63 meters squared.    I/O last 3 completed shifts:  In: 952.5 [I.V.:152.5; NG/GT:500; IV Piggyback:300]  Out: 10 [Drains:10]    Physical Exam   Constitutional: He appears well-nourished. He appears lethargic. He appears ill.   HENT:   Head: Normocephalic and atraumatic.   Mouth/Throat:  No oropharyngeal exudate.   Eyes: Right eye exhibits no discharge. Left eye exhibits no discharge. No scleral icterus.   Neck: Normal range of motion. Neck supple. No JVD present.   Cardiovascular: Regular rhythm and normal heart sounds.   No murmur heard.  Pulmonary/Chest: He has rales.   Abdominal: Soft. Bowel sounds are normal. He exhibits no distension.   Musculoskeletal: Normal range of motion. He exhibits deformity (LBKA). He exhibits no edema.   Neurological: He appears lethargic.   Skin: Skin is warm and dry.   Vitals reviewed.      Significant Labs:  CBC:   Recent Labs   Lab 01/31/20  0303   WBC 7.28   RBC 4.03*   HGB 11.2*   HCT 36.6*      MCV 91   MCH 27.8   MCHC 30.6*     CMP:   Recent Labs   Lab 01/31/20  0500   *   CALCIUM 9.9   ALBUMIN 2.5*   PROT 8.5*      K 4.2   CO2 19*      BUN 72*   CREATININE 8.4*   ALKPHOS 228*   ALT 28   AST 30   BILITOT 0.4            Assessment/Plan:     * Seizure  - per primary team     ESRD on hemodialysis  The patient is a 55 yo AAM admitted to MICU with seizure activity after presenting to the ED on 1/20 after being found with alter mental status at his NH facility Monday morning. The patient is currently on continuous EEG monitoring. Last dialyze on MWF.     Nephrology History  iHD Schedule: MWF  Unit/MD: [facility]/ Dr. Lemus  Duration: 3.5 hrs  EDW: ?  Access: [facility] AVF   Resodial Renal Function: Yes (per records)    Plan:   - Will plan for HD today for metabolic clearance and volume management, target UF 1-2 L as tolerated, keep MAP >65.  - Daily renal function panels   - Renal diet or Novasource TF   - On mechanical ventilation with FiO at 21%  - Continue to monitor intake and output, daily weights   - Avoid nephrotoxic medication and renal dose medications to GFR  - Will discuss with         Thank you for your consult. I will follow-up with patient. Please contact us if you have any additional questions.    Glenis Benavidez,  DNP, FNP-C  Nephrology  [facility]

## 2020-01-31 NOTE — ASSESSMENT & PLAN NOTE
The patient is a 53 yo AAM admitted to MICU with seizure activity after presenting to the ED on 1/20 after being found with alter mental status at his NH facility Monday morning. The patient is currently on continuous EEG monitoring. Last dialyze on MWF.     Nephrology History  iHD Schedule: MWF  Unit/MD: [facility]/ Dr. Lemus  Duration: 3.5 hrs  EDW: ?  Access: [facility] AVF   Resodial Renal Function: Yes (per records)    Plan:   - Will plan for HD today for metabolic clearance and volume management, target UF 1-2 L as tolerated, keep MAP >65.  - Daily renal function panels   - Renal diet or Novasource TF   - On mechanical ventilation with FiO at 21%  - Continue to monitor intake and output, daily weights   - Avoid nephrotoxic medication and renal dose medications to GFR  - Will discuss with

## 2020-01-31 NOTE — PT/OT/SLP EVAL
Speech Language Pathology Evaluation  Bedside Swallow    Patient Name:  Vaughn Retana   MRN:  [id number]   [id number]    Admitting Diagnosis: Seizure    Recommendations:                 General Recommendations:  Dysphagia therapy  Diet recommendations:  NPO, NPO   Aspiration Precautions: Strict aspiration precautions   General Precautions: Standard, aspiration, fall, NPO  Communication strategies:  go to room if call light pushed    History:     Past Medical History:   Diagnosis Date    Amputation stump pain 9/10/2013    Aspiration pneumonia 7/27/2015    Asterixis 11/8/2016    C. difficile colitis 8/7/2015    Cholelithiasis without obstruction 8/25/2015    Chronic diastolic heart failure     2-23-17   1 - Low normal to mildly depressed left ventricular systolic function (EF 50-55%).    2 - Right ventricular enlargement with mildly depressed systolic function.    3 - Left ventricular diastolic dysfunction.    4 - Right atrial enlargement.    5 - Severe tricuspid regurgitation.    6 - Pulmonary hypertension. The estimated PA systolic pressure is 86 mmHg.    7 - Increased central venous pressure.    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  Service: Endoscopy;  Laterality: N/A;  PA Systolic Pressure 85.56. HD Patient MWF, K+ lab prior to procedure.     ESOPHAGOGASTRODUODENOSCOPY N/A 6/12/2018    Procedure: EGD (ESOPHAGOGASTRODUODENOSCOPY);  Surgeon: Man Galicia MD;  [facility]  Service: Endoscopy;  Laterality: N/A;  EGD in 8-12 weeks with Dr. Galicia on 4th floor for follow up erosive esophagitis and Mejia's surveillance.    Patient should be on Pantoprazole 40mg every 12 hours or the equivulant of another PPI    awaiting for patient to reply back regarding changing    ESOPHAGOGASTRODUODENOSCOPY N/A 3/7/2019    Procedure: EGD (ESOPHAGOGASTRODUODENOSCOPY);  Surgeon: Man Galicia MD;  Location: [facility] ([address] [place] [address]);  Service: Endoscopy;  Laterality: N/A;    ESOPHAGOGASTRODUODENOSCOPY N/A 9/23/2019    Procedure: EGD (ESOPHAGOGASTRODUODENOSCOPY);  Surgeon: Keanu Rainey MD;  Location: [address]);  Service: Endoscopy;  Laterality: N/A;    ESOPHAGOGASTRODUODENOSCOPY N/A 10/2/2019    Procedure: EGD (ESOPHAGOGASTRODUODENOSCOPY);  Surgeon: Kevin De La Paz MD;  Location: [address]);  Service: Endoscopy;  Laterality: N/A;    ESOPHAGOGASTRODUODENOSCOPY N/A 11/12/2019    Procedure: EGD (ESOPHAGOGASTRODUODENOSCOPY);  Surgeon: Kevin De La Paz MD;  Location: [facility] ([address] [place] [address]);  Service: Endoscopy;  Laterality: N/A;    FOOT AMPUTATION THROUGH METATARSAL      left foot    LEG AMPUTATION THROUGH KNEE  12/18/2013    left BKA    R AVF  9/12/12    UPPER GASTROINTESTINAL ENDOSCOPY       Prior Intubation HX:  Intubated 1/20-1/30/20    Prior diet: Pt report  unappreciated.     Subjective     Vocalizations unappreciated throughout evaluation.     Pain/Comfort:  · Pain Rating 1: 0/10  · Pain Rating Post-Intervention 1: 0/10    Objective:     Oral Musculature Evaluation  · Oral Musculature: unable to assess due to poor participation/comprehension  · Secretion Management: other (see comments)(Generalized drooling)  · Volitional Cough: GONZALO 2/2 dec'd comprehension  · Volitional Swallow: GONZALO 2/2 dec'd comprehension  · Voice Prior to PO Intake: GONZALO 2/2 dec'd comprehension    Bedside Swallow Eval:   Consistencies Assessed:  · Thin water- multiple straw sips attempted to be provided  · Pureed apple sauce- 1/2 tsp attempted to be provided across multiple trials    Oral Phase:   · Attempt to initiate bolus acceptance unappreciated despite straw tip/ tsp tip touching pt's lower labial surface and repetitive verbal prompting provided    Pharyngeal Phase:   · GONZALO 2/2 oral phase as documented above    Treatment:   Pt asleep upon entry. Given extensive verbal prompting, gentle sternal rub, and thermal stimulation provided via cool, damp cloth to face, pt eventually awakened. However attempt to vocalize unappreciated throughout session, despite repetitive verbal prompting provided. HOB raised. Although education provided re: role of SLP and ongoing SLP POC, indication of pt's understanding unappreciated. White board updated. No further questions.     Results discussed with NSG and medical team who verbalized understanding of all information provided and agreement with SLP POC.     Assessment:     Vaughn Retana is a 54 y.o. male with an SLP diagnosis of dysphagia.     Goals:   Multidisciplinary Problems     SLP Goals        Problem: SLP Goal    Goal Priority Disciplines Outcome   SLP Goal     SLP Ongoing, Progressing   Description:  Speech Language Pathology  Goals expected to be met by 2/7:  1. Pt will participate in ongoing assessment of swallow to determine safest, least  restrictive diet.                   Plan:     · Patient to be seen:  4 x/week   · Plan of Care expires:  02/29/20  · Plan of Care reviewed with:  patient   · SLP Follow-Up:  Yes       Discharge recommendations:  other (see comments)(Pending progress)     Time Tracking:     SLP Treatment Date:   01/31/20  Speech Start Time:  0954  Speech Stop Time:  1004     Speech Total Time (min):  10 min    Billable Minutes: Eval Swallow and Oral Function 10    NALDO Holden, CCC-SLP  [phone]  1/31/2020

## 2020-01-31 NOTE — PLAN OF CARE
Clinical evaluation of swallow complete. Recommend ongoing NPO with cont'd NG tube for all nutrition, hydration, medication.     NALDO Holden, CCC-SLP  [phone]  1/31/2020

## 2020-01-31 NOTE — SUBJECTIVE & OBJECTIVE
Interval History/Significant Events:   NAEON. Sever muscle weakness, protecting his airway, SLP eval and checking CK.    Review of Systems   Unable to perform ROS: Acuity of condition     Objective:     Vital Signs (Most Recent):  Temp: 98.4 °F (36.9 °C) (01/31/20 1100)  Pulse: 81 (01/31/20 1145)  Resp: 20 (01/31/20 1145)  BP: (!) 105/48 (01/31/20 1145)  SpO2: 99 % (01/31/20 1145) Vital Signs (24h Range):  Temp:  [97.7 °F (36.5 °C)-98.5 °F (36.9 °C)] 98.4 °F (36.9 °C)  Pulse:  [78-93] 81  Resp:  [13-25] 20  SpO2:  [93 %-100 %] 99 %  BP: ()/(46-91) 105/48   Weight: 57 kg (125 lb 10.6 oz)  Body mass index is 20.28 kg/m².      Intake/Output Summary (Last 24 hours) at 1/31/2020 1152  Last data filed at 1/31/2020 1122  Gross per 24 hour   Intake 1015 ml   Output 10 ml   Net 1005 ml       Physical Exam   Constitutional: No distress.   SOMNOLENT    HENT:   Head: Normocephalic and atraumatic.   Mouth/Throat: No oropharyngeal exudate.   Eyes: Pupils are equal, round, and reactive to light.   Neck: No JVD present.   Cardiovascular: Normal rate, regular rhythm and normal heart sounds.   No murmur heard.  Pulmonary/Chest: Effort normal. No respiratory distress. He has no wheezes. He has rales (minimal inspiratory rales in bases).   Breathing comfortably, though creating copious secretions with occasional gurgling before suction   Abdominal: Soft. He exhibits no distension. There is no tenderness.   Musculoskeletal:   Left below knee amputation   Neurological: He is alert.   PERRL. Alert. Following commands. Generalized weakness, notably weak cough. Moving all limbs on command. Aphasic    Skin: Skin is warm and dry. Capillary refill takes less than 2 seconds. He is not diaphoretic.   Vitals reviewed.      Vents:  Vent Mode: Spont (01/30/20 1205)  Ventilator Initiated: Yes (01/20/20 1327)  Set Rate: 12 BPM (01/30/20 0739)  Vt Set: 340 mL (01/24/20 1403)  Pressure Support: 7 cmH20 (01/30/20 1205)  PEEP/CPAP: 5 cmH20  (01/30/20 1205)  Oxygen Concentration (%): 21 (01/30/20 1205)  Peak Airway Pressure: 12 cmH2O (01/30/20 1205)  Plateau Pressure: 17 cmH20 (01/30/20 1205)  Total Ve: 5.67 mL (01/30/20 1205)  F/VT Ratio<105 (RSBI): (!) 33.33 (01/30/20 1205)  Lines/Drains/Airways     Drain                 Hemodialysis AV Fistula Right upper arm -- days         Hemodialysis AV Fistula Right upper arm -- days         NG/OG Tube 01/20/20 1932 [place] sump Right nostril 10 days          Peripheral Intravenous Line                 Peripheral IV - Single Lumen 01/28/20 0905 18 G;1 3/4 in Left Upper Arm 3 days         Peripheral IV - Single Lumen 01/30/20 1910 22 G Anterior;Distal;Left Wrist less than 1 day              Significant Labs:    CBC/Anemia Profile:  Recent Labs   Lab 01/30/20  0604 01/30/20  2100 01/31/20  0303   WBC 8.89 9.03 7.28   HGB 12.3* 10.0* 11.2*   HCT 39.4* 32.2* 36.6*    361* 321   MCV 89 90 91   RDW 16.0* 16.0* 16.3*        Chemistries:  Recent Labs   Lab 01/30/20  0259 01/30/20  2100 01/31/20  0500    139 138   K 4.1 4.2 4.2    101 102   CO2 18* 17* 19*   BUN 43* 67* 72*   CREATININE 5.5* 7.6* 8.4*   CALCIUM 10.4 10.0 9.9   ALBUMIN 2.6* 2.6* 2.5*   PROT 8.9* 8.9* 8.5*   BILITOT 0.5 0.4 0.4   ALKPHOS 219* 231* 228*   ALT 34 31 28   AST 33 34 30   MG 2.4 2.6 2.7*   PHOS 3.6  --  6.0*       All pertinent labs within the past 24 hours have been reviewed.    Significant Imaging:  I have reviewed all pertinent imaging results/findings within the past 24 hours.

## 2020-01-31 NOTE — ASSESSMENT & PLAN NOTE
CT head showed no acute changes  Patent follow commands but aphasic  Diffuse muscle weakness s.p extubation   Could be due to debility vs keppra vs patient baseline   Failed swallow eval   CK normal     - NPO  - Working on contacting the family to get more information about patient baseline   - decreasing keppra to 500 daily

## 2020-01-31 NOTE — PT/OT/SLP PROGRESS
Occupational Therapy      Patient Name:  Vaughn Retana   MRN:  [id number]    Patient not seen today secondary to AM attempt, pt drowsy due to medication and presenting with poor alertness. PM attempt pt not medically appropriate to be seen at this time due to starting hemodialysis at bedside. Still an evaluation. Will follow-up 2/1/2020.    RANDELL Davison  1/31/2020

## 2020-01-31 NOTE — PT/OT/SLP PROGRESS
Physical Therapy      Patient Name:  Vaughn Retana   MRN:  [id number]    Patient not seen today secondary to AM attempt, pt drowsy due to medication and presenting with poor alertness. PM attempt pt not medically appropriate to be seen at this time due to starting hemodialysis at bedside. Still an evaluation. Will follow-up at next possible date as pt is medically appropriate.     Lainey Renner, PT

## 2020-01-31 NOTE — SUBJECTIVE & OBJECTIVE
Interval History: Patient extubated on yesterday, patient appears comfortable on RA. Withdrawn on assessment, but nods appropriately to direct questions.     Review of patient's allergies indicates:   Allergen Reactions    Fosrenol [lanthanum] Nausea And Vomiting     Nausea and vomiting     Current Facility-Administered Medications   Medication Frequency    0.9%  NaCl infusion Once    acetaminophen tablet 650 mg Q6H PRN    carvedilol tablet 3.125 mg BID    dextrose 10% (D10W) Bolus PRN    glucagon (human recombinant) injection 1 mg PRN    heparin (porcine) injection 5,000 Units Q8H    levETIRAcetam in NaCl (iso-os) IVPB 500 mg Q12H    midodrine tablet 5 mg Every Mon, Wed, Fri    pantoprazole injection 40 mg Q12H    sevelamer carbonate pwpk 1.6 g TID    sodium chloride 0.9% flush 10 mL PRN       Objective:     Vital Signs (Most Recent):  Temp: 97.8 °F (36.6 °C) (01/31/20 0701)  Pulse: 83 (01/31/20 0800)  Resp: (!) 21 (01/31/20 0800)  BP: (!) 151/67 (01/31/20 0800)  SpO2: 100 % (01/31/20 0800)  O2 Device (Oxygen Therapy): room air (01/31/20 0800) Vital Signs (24h Range):  Temp:  [97.7 °F (36.5 °C)-98.5 °F (36.9 °C)] 97.8 °F (36.6 °C)  Pulse:  [80-93] 83  Resp:  [13-25] 21  SpO2:  [93 %-100 %] 100 %  BP: ()/(46-91) 151/67     Weight: 57 kg (125 lb 10.6 oz) (01/29/20 1200)  Body mass index is 20.28 kg/m².  Body surface area is 1.63 meters squared.    I/O last 3 completed shifts:  In: 952.5 [I.V.:152.5; NG/GT:500; IV Piggyback:300]  Out: 10 [Drains:10]    Physical Exam   Constitutional: He appears well-nourished. He appears lethargic. He appears ill.   HENT:   Head: Normocephalic and atraumatic.   Mouth/Throat: No oropharyngeal exudate.   Eyes: Right eye exhibits no discharge. Left eye exhibits no discharge. No scleral icterus.   Neck: Normal range of motion. Neck supple. No JVD present.   Cardiovascular: Regular rhythm and normal heart sounds.   No murmur heard.  Pulmonary/Chest: He has rales.    Abdominal: Soft. Bowel sounds are normal. He exhibits no distension.   Musculoskeletal: Normal range of motion. He exhibits deformity (LBKA). He exhibits no edema.   Neurological: He appears lethargic.   Skin: Skin is warm and dry.   Vitals reviewed.      Significant Labs:  CBC:   Recent Labs   Lab 01/31/20  0303   WBC 7.28   RBC 4.03*   HGB 11.2*   HCT 36.6*      MCV 91   MCH 27.8   MCHC 30.6*     CMP:   Recent Labs   Lab 01/31/20  0500   *   CALCIUM 9.9   ALBUMIN 2.5*   PROT 8.5*      K 4.2   CO2 19*      BUN 72*   CREATININE 8.4*   ALKPHOS 228*   ALT 28   AST 30   BILITOT 0.4

## 2020-01-31 NOTE — PLAN OF CARE
CMICU DAILY GOALS       A: Awake    RASS: Goal - RASS Goal: 0-->alert and calm  Actual - RASS ([place]ox Agitation-Sedation Scale): -1-->drowsy   Restraint necessity: Clinical Justification: Removing medical devices  B: Breath   SBT: Pass   C: Coordinate A & B, analgesics/sedatives   Pain: managed    SAT: Pass  D: Delirium   CAM-ICU: Overall CAM-ICU: Negative  E: Early Mobility   MOVE Screen: Pass   Activity: Activity Management: bedrest maintained per order  FAS: Feeding/Nutrition   Diet order: Diet/Nutrition Received: tube feeding,   Fluid restriction:    T: Thrombus   DVT prophylaxis: VTE Required Core Measure: Pharmacological prophylaxis initiated/maintained  H: HOB Elevation   Head of Bed (HOB): HOB at 45 degrees  U: Ulcer Prophylaxis   GI: yes  G: Glucose control   managed Glycemic Management: blood glucose monitoring  S: Skin   Bundle compliance: yes   Bathing/Skin Care: bath, chlorhexidine, bath, complete, linen changed, dressed/undressed Date: 1/30/2020 1500  B: Bowel Function   no issues   I: Indwelling Catheters   Arcos necessity: [REMOVED]      Urethral Catheter 01/20/20 1950 Straight-tip 16 Fr.-Reason for Continuing Urinary Catheterization: Critically ill in ICU requiring intensive monitoring   CVC necessity: Yes   IPAD offered: No  D: De-escalation Antibx   No  Plan for the day   Extubate, resume tube feed,   Family/Goals of care/Code Status   Code Status: Full Code     No acute events throughout day, VS and assessment per flow sheet, patient progressing towards goals as tolerated, plan of care reviewed with Vaughn Retana and family, all concerns addressed, will continue to monitor.

## 2020-01-31 NOTE — PLAN OF CARE
CMICU DAILY GOALS       A: Awake    RASS: Goal - RASS Goal: 0-->alert and calm  Actual - RASS (Maddox Agitation-Sedation Scale): -1-->drowsy   Restraint necessity: Clinical Justification: Removing medical devices  B: Breath   SBT: Not intubated   C: Coordinate A & B, analgesics/sedatives   Pain: managed    SAT: Not intubated  D: Delirium   CAM-ICU: Overall CAM-ICU: Negative  E: Early Mobility   MOVE Screen: Pass   Activity: Activity Management: activity adjusted per tolerance  FAS: Feeding/Nutrition   Diet order: Diet/Nutrition Received: tube feeding,   Fluid restriction:    T: Thrombus   DVT prophylaxis: VTE Required Core Measure: Pharmacological prophylaxis initiated/maintained  H: HOB Elevation   Head of Bed (HOB): HOB at 30 degrees  U: Ulcer Prophylaxis   GI: yes  G: Glucose control   managed Glycemic Management: blood glucose monitoring  S: Skin   Bundle compliance: yes   Bathing/Skin Care: back care Date: [unfilled]  B: Bowel Function   no issues   I: Indwelling Catheters   Arcos necessity: [REMOVED]      Urethral Catheter 01/20/20 1950 Straight-tip 16 Fr.-Reason for Continuing Urinary Catheterization: Critically ill in ICU requiring intensive monitoring   CVC necessity: No   IPAD offered: Yes  D: De-escalation Antibx   Yes  Plan for the day   For hemodialysis and Speech therapy evaluation today  Family/Goals of care/Code Status   Code Status: Full Code     No acute events throughout day, VS and assessment per flow sheet, patient progressing towards goals as tolerated, plan of care reviewed with Vaughn Retana and family, all concerns addressed, will continue to monitor.

## 2020-02-01 PROBLEM — N18.6 ESRD ON DIALYSIS: Status: ACTIVE | Noted: 2020-01-01

## 2020-02-01 PROBLEM — Z99.2 ESRD ON DIALYSIS: Status: ACTIVE | Noted: 2020-01-01

## 2020-02-01 NOTE — PROGRESS NOTES
[facility]  Critical Care Medicine  Progress Note    Patient Name: Vaughn Retana  MRN: [id number]  Admission Date: 1/20/2020  Hospital Length of Stay: 12 days  Code Status: Full Code  Attending Provider: Ricky Morgan MD  Primary Care Provider: Cori Randall MD   Principal Problem: Seizure    Subjective:     HPI:  Patient is a 54 year old male with extensive medical history including ESRD on dialysis MWF (has not received it today), L below knee amputation 2/2 osteomyelitis, dCHF (last echo 8/2/19 [facility] [place]), GERD, h/o multiple ICH w/o residual deficits, and multiple instances of GI bleed (last EGD 11/12/19, esophagitis) who presents to ED [facility] due to altered mental status. From NH report, nursing home staff went to wake the patient for his morning dialysis. He was confused, lethargic, had a moderate amount of brown colored emesis in his trash can. Patient last got dialysis on Friday. Patient is normally able to ambulate on his own and is alert and oriented; nursing staff reports that he appeared normal yesterday.       At the time of ICU consult, initial work up showed largely unremarkable cbc with no leukocytosis. CMP significant for a bicarb of 36; patient is ESRD with creatinine of 10.8. Patient still makes some urine, and UA was without evidence for UTI. Lactic acid acid was mildly elevated at 2.4 and troponin mildly elevated at .348->.339. INR is at 1.2. Alcohol and drug screen negative. Patient was given versed prior to undergoing CT scan; which showed no acute intraparenchymal hemorrhage or major vascular distribution, senescent changes, and remote lacunar type basal ganglia infarct. Shortly after CT, patient had a witnessed tonic clonic seizure. He received 2 mg of ativan and 1500 of keppra. Patient became non-responsive afterwards and was severely hypertensive with systolic bp in the 200's. Patient was intubated for airway protection and started on propofol and  "Cardene drip for hypertension. Family updated over the phone and at bedside.                Hospital/ICU Course:  Patient admitted to critical care medicine on 1/20 with concerns of new onset seizures and s/p intubation for airway protection. Patient was started on vanc, rocephin, ampicillin, and acyclovir to cover for meningitis. Patient had spot EEG while on propofol in ED which did not show seizure activity. MRI done showed chronic changes but no acute abnormalities. Patient was taken off cardene drip soon after he arrived in ICU. Lumbar puncture was done after MRI. CSF labs were not convincing for bacterial or viral meningitis; although cultures are pending. Continuous EEG was done after propofol was stopped which showed epileptiform discharges. Per epilepsy, patient was given another dose of Keppra, will follow up after checking levels as patient is ESRD. Nephrology consulted and started HD. Pt became febrile the night of 01/22; blood cultures repeated and restarted on Vanc and Zosyn. HSV PCR neg so Acyclovir discontinued. Neurology would like SBP < 160; initiated home Carvedilol. Pt became hypotensive with HD on 1/24 so unable to remove fluid off during dialysis. BP trending back up and stable. Neurology will monitor patient again with EEG for seizures. 01/27 Patient's neuro exam is somewhat improved from day prior per nurse - opening eyes spontaneously and occasionally tracking movements, blinking to threat. Received dialysis today. EEG to end 15:30, will f/u Neuro recs regarding continuing Keppra or discontinuing. 01/28 passed SBT today, plan to extubate 01/29. Repeat 24 hr EEG shows "multifocal slowing consistent with multiple areas of focal cortical dysfunction and occasional epileptiform discharges in the left frontotemporal region consistent with a potential seizure focus in this area" with no electrographic seizures. 01/29 Originally planned for extubation today, but will defer in light of BRBPR ~08:30 " per nurse. GI consulted, no scope indicated at this time. Will restart PPI. Otherwise mental status improving in small increments daily. Keppra decreased to 500 mg bid. 01/30 Patient successfully extubated, though very weak. Unable to clear secretions effectively at this time, weak cough. AOx2, following commands and responding to questions appropriately. Wound care consulted for 2 areas of ulceration on back of head. Next day after extubation patient became aphasic, somnolent, not following all commands, keppra decreased to 500 mg once daily.Repeating head CT.    Interval History/Significant Events: no cute events overnight     Review of Systems   Unable to perform ROS: Acuity of condition     Objective:     Vital Signs (Most Recent):  Temp: 98.9 °F (37.2 °C) (02/01/20 0701)  Pulse: 90 (02/01/20 0800)  Resp: 19 (02/01/20 0800)  BP: (!) 147/65 (02/01/20 0800)  SpO2: 98 % (02/01/20 0800) Vital Signs (24h Range):  Temp:  [97.8 °F (36.6 °C)-99 °F (37.2 °C)] 98.9 °F (37.2 °C)  Pulse:  [] 90  Resp:  [16-33] 19  SpO2:  [94 %-100 %] 98 %  BP: ()/(40-70) 147/65   Weight: 57 kg (125 lb 10.6 oz)  Body mass index is 20.28 kg/m².      Intake/Output Summary (Last 24 hours) at 2/1/2020 0836  Last data filed at 2/1/2020 0800  Gross per 24 hour   Intake 1375 ml   Output 1420 ml   Net -45 ml       Physical Exam   Constitutional: No distress.   SOMNOLENT    HENT:   Head: Normocephalic and atraumatic.   Mouth/Throat: No oropharyngeal exudate.   Eyes: Pupils are equal, round, and reactive to light.   Neck: No JVD present.   Cardiovascular: Normal rate, regular rhythm and normal heart sounds.   No murmur heard.  Pulmonary/Chest: Effort normal. No respiratory distress. He has no wheezes. He has rales (minimal inspiratory rales in bases).   Breathing comfortably, though creating copious secretions with occasional gurgling before suction   Abdominal: Soft. He exhibits no distension. There is no tenderness.   Musculoskeletal:    Left below knee amputation   Neurological: He is alert.   PERRL. Alert. Following some commands. Generalized weakness, notably weak cough. Moving all limbs on command. Aphasic    Skin: Skin is warm and dry. Capillary refill takes less than 2 seconds. He is not diaphoretic.   Vitals reviewed.      Vents:  Vent Mode: Spont (01/30/20 1205)  Ventilator Initiated: Yes (01/20/20 1327)  Set Rate: 12 BPM (01/30/20 0739)  Vt Set: 340 mL (01/24/20 1403)  Pressure Support: 7 cmH20 (01/30/20 1205)  PEEP/CPAP: 5 cmH20 (01/30/20 1205)  Oxygen Concentration (%): 21 (01/30/20 1205)  Peak Airway Pressure: 12 cmH2O (01/30/20 1205)  Plateau Pressure: 17 cmH20 (01/30/20 1205)  Total Ve: 5.67 mL (01/30/20 1205)  F/VT Ratio<105 (RSBI): (!) 33.33 (01/30/20 1205)  Lines/Drains/Airways     Drain                 Hemodialysis AV Fistula Right upper arm -- days         Hemodialysis AV Fistula Right upper arm -- days         NG/OG Tube 01/20/20 1932 [place] sump Right nostril 11 days          Peripheral Intravenous Line                 Peripheral IV - Single Lumen 01/28/20 0905 18 G;1 3/4 in Left Upper Arm 3 days         Peripheral IV - Single Lumen 01/30/20 1910 22 G Anterior;Distal;Left Wrist 1 day              Significant Labs:    CBC/Anemia Profile:  Recent Labs   Lab 01/30/20  2100 01/31/20  0303 02/01/20 0227   WBC 9.03 7.28 10.44   HGB 10.0* 11.2* 11.1*   HCT 32.2* 36.6* 36.2*   * 321 272   MCV 90 91 92   RDW 16.0* 16.3* 16.4*        Chemistries:  Recent Labs   Lab 01/30/20  2100 01/31/20  0500 02/01/20 0227    138 139   K 4.2 4.2 4.8    102 104   CO2 17* 19* 18*   BUN 67* 72* 37*   CREATININE 7.6* 8.4* 5.1*   CALCIUM 10.0 9.9 9.8   ALBUMIN 2.6* 2.5* 2.6*   PROT 8.9* 8.5* 8.9*   BILITOT 0.4 0.4 0.4   ALKPHOS 231* 228* 222*   ALT 31 28 26   AST 34 30 34   MG 2.6 2.7* 2.3   PHOS  --  6.0* 4.5           Significant Imaging:  I have reviewed all pertinent imaging results/findings within the past 24  hours.      ABG  Recent Labs   Lab 01/30/20  1157   PH 7.397   PO2 109*   PCO2 34.4*   HCO3 21.2*   BE -4     Assessment/Plan:     Neuro  * Seizure  Altered mental status     Patient initially presented to ED prior to getting dialysis due to AMS and brown emesis. Had witnessed tonic clonic seizure requiring ativan shortly after getting a CT head. He was loaded with 1500 mg Keppra, intubated for airway protection, and started on propofol for sedation. Upon physical exam, patient remained unresponsive to verbal or tactile stimuli and had upward left sided gaze with sluggish pupils. Concern for status epilepticus. Differential diagnosis includes polysubstance, sepsis, intracranial abnormalities, and PRES. 01/29 mental status continues to slowly improve daily.    - Spot EEG without evidence of current seizure. However patient already received keppra, ativan, and sedated on propofol. 24 hour EEG with L sided structural lesion and underlying epileptiform potential. Initiated 1500 mg keppra on 01/22, per Neurology recs.  - Substance induced: UDS negative, alcohol level wnl  - Sepsis: Initial lactate 2.4, likely due to seizure event; repeat was 1.9. Patient received 500 ml saline in ED. Initial blood cultures, sputum culture+gram stain neg  Lumbar puncture done, CSF not convincing for meningitis, so ampicillin, vancomycin, rocephin d/c'd on 01/22. However, pt was febrile on 11/23 to 101.3. Blood cultures repeated and restarted on Vanc and Zosyn. Repeat cultures NGTD. HSV PCR neg; Acyclovir discontinued. Prt has remained afebrile for > 72 hours. Vanc and Zosyn discontinued 01/26.   - Intracranial: patient with history of ICH with no functional deficits. Possibly multiple foci for seizure overtime. CT without acute changes, MRI brain with chronic changes and hypertensive angiopathy; no acute changes.  - PRES: Patient off cardene drip. MRI reviewed. Will follow blood pressure as patient is normally hypotensive.   - Neurology  would like to repeat EEG to ensure that pt is not having any seizures. Finishing 15:30 on 01/27. Diffuse slowing, though no focal activity seen on EEG. 01/28 patient's mental status is continuing to improve day to day, now following commands. Passed SBT 01/28. Plan for extubation 01/29 deferred in light of suspected LGIB. Keppra brought back to 500 mg bid. 01/30 Patient is alert and following commands. Passed SBT and extubated ~12:00 pm. Breathing well though with generalized weakness as well as weak cough. PT/OT/SLP ordered    - decreased keppra 500 daily due to weakness and somnolence   - repeating head CT   - Neurology reconsulted in concern for seizure and stroke.     Aphasia  CT head showed no acute changes  Patent follow commands but aphasic  Diffuse muscle weakness s.p extubation   Could be due to debility vs keppra vs patient baseline   Failed swallow eval   CK normal   Per nursing home patient was talkative and following commands prior to his admission   Off note patient has history of aphasia     - NPO and tube feed  - decreasing keppra to 500 daily   - repeat CT Head   - Neurology reconsulted in concern for seizure and stroke.     Cardiac/Vascular  Renovascular hypertension  Patient on coreg at nursing home; has been compliant as given by nursing home. Patient with hypertensive emergency on admit and started Cardene drip to titrate to BP of 160-180. Off Cardene drip since 01/20. Goal SBP < 160 per Neurology. Pt started on home Carvedilol on 01/24; BP well controlled.     -- continue home carvedilol 3.125 mg BID    Chronic diastolic heart failure  [place] [facility] [place], EF>55%, bi-atrial enlargement  No signes of exacerbation       Endocrine  Severe malnutrition  Nutrition consulted  Novasource @ 30 mL/hr to provide 1440 kcals, 65 g of protein, 516 mL fluid.     - Continue TFs    GI  Gastrointestinal hemorrhage with hematemesis  GERD with esophagitis    Patient with reports of coffee ground emesis  prior to transport to ED. In ED, dark brown contents suctioned from stomach. Hemoglobin remains stable at 15 and patient is hypertensive. Patient is well known to GI. His most recent scope was in November that just showed esophagitis similar to his previous EGD. GI consulted and appreciate recommendations. Currently on 40 mg BID. 01/29 - patient had BRBPR ~08:30 per nurse. GI consulted - no scope indicated at this time. Will switch to PPI from famotidine.    - H/H stable, will space CBC to daily  - PPI restarted 01/29    GERD with esophagitis  Continue Protonix  Follow up with GI out patient           Critical Care Daily Checklist:     A: Awake: RASS Goal/Actual Goal: RASS Goal: 0-->alert and calm  Actual: [place] Agitation Sedation Scale (RASS): Alert and calm   B: Spontaneous Breathing Trial Performed? Spon. Breathing Trial Initiated?: Initiated (01/30/20 1205)   C: SAT & SBT Coordinated?  yes                      D: Delirium: CAM-ICU Overall CAM-ICU: Negative   E: Early Mobility Performed? Yes   F: Feeding Goal: Goals: Meet % EEN, EPN  Status: Nutrition Goal Status: goal met   Current Diet Order   No orders of the defined types were placed in this encounter.       AS: Analgesia/Sedation n/a   T: Thromboembolic Prophylaxis Heparin   H: HOB > 300 Yes   U: Stress Ulcer Prophylaxis (if needed) PPI   G: Glucose Control managing   B: Bowel Function Stool Occurrence: 1   I: Indwelling Catheter (Lines & Arcos) Necessity PIV x2   NGT      D: De-escalation of Antimicrobials/Pharmacotherapies       Plan for the day/ETD    Continue to monitor neurologic status for improvement. Repeat head CT      Code Status:  Family/Goals of Care: Full Code               Critical secondary to seizure        Critical care was time spent personally by me on the following activities: development of treatment plan with patient or surrogate and bedside caregivers, discussions with consultants, evaluation of patient's response to  treatment, examination of patient, ordering and performing treatments and interventions, ordering and review of laboratory studies, ordering and review of radiographic studies, pulse oximetry, re-evaluation of patient's condition. This critical care time did not overlap with that of any other provider or involve time for any procedures.     ROLDAN Mabry  Critical Care Medicine  [facility]

## 2020-02-01 NOTE — PLAN OF CARE
Eval and POC set 2/1/20    Problem: Occupational Therapy Goal  Goal: Occupational Therapy Goal  Description  Goals to be met by: 2 weeks (2/15/20)     Patient will increase functional independence with ADLs by performing:    Grooming while seated with Moderate Assistance.  Sitting at edge of bed x8 minutes with Contact Guard Assistance.  Supine to sit with Moderate Assistance.  Squat pivot transfers with Maximum Assistance.  Increased functional strength to WFL for ADLs and mobility tasks.  Pt will follow 3/3 one-step commands to participate in ADL task.     Outcome: Ongoing, Progressing

## 2020-02-01 NOTE — PLAN OF CARE
Problem: Physical Therapy Goal  Goal: Physical Therapy Goal  Description  Goals to be met by: 2020    Patient will increase functional independence with mobility by performin. Supine to sit with Moderate Assistance - not met  2. Rolling to Left with Moderate Assistance. - not met  3. Rolling to Right with Moderate Assistance - not met  4. Sit to stand transfer with Moderate Assistance - not met  5. Sitting at edge of bed x8 minutes with Contact Guard Assistance - not met  6. Lower extremity exercise program x15 reps per handout, with assistance as needed - not met     Outcome: Ongoing, Progressing     Eval completed and goals appropriate

## 2020-02-01 NOTE — ASSESSMENT & PLAN NOTE
The patient is a 53 yo AAM admitted to MICU with seizure activity after presenting to the ED on 1/20 after being found with alter mental status at his NH facility Monday morning. The patient is currently on continuous EEG monitoring. Last dialyze on MWF.      Nephrology History  iHD Schedule: MWF  Unit/MD: [facility]/ Dr. Lemus  Duration: 3.5 hrs  EDW: ?  Access: [facility]   Resodial Renal Function: Yes (per records)     Plan:   - no emergent need for RRT today  - will repeat ABG and lactic acid given unexplained high anion gap   - Daily renal function panels   - Renal diet or Novasource TF   - On mechanical ventilation with FiO at 21%  - Continue to monitor intake and output, daily weights   - Avoid nephrotoxic medication and renal dose medications to GFR  - Will discuss with Dr. Forte

## 2020-02-01 NOTE — ASSESSMENT & PLAN NOTE
Altered mental status     Patient initially presented to ED prior to getting dialysis due to AMS and brown emesis. Had witnessed tonic clonic seizure requiring ativan shortly after getting a CT head. He was loaded with 1500 mg Keppra, intubated for airway protection, and started on propofol for sedation. Upon physical exam, patient remained unresponsive to verbal or tactile stimuli and had upward left sided gaze with sluggish pupils. Concern for status epilepticus. Differential diagnosis includes polysubstance, sepsis, intracranial abnormalities, and PRES. 01/29 mental status continues to slowly improve daily.    - Spot EEG without evidence of current seizure. However patient already received keppra, ativan, and sedated on propofol. 24 hour EEG with L sided structural lesion and underlying epileptiform potential. Initiated 1500 mg keppra on 01/22, per Neurology recs.  - Substance induced: UDS negative, alcohol level wnl  - Sepsis: Initial lactate 2.4, likely due to seizure event; repeat was 1.9. Patient received 500 ml saline in ED. Initial blood cultures, sputum culture+gram stain neg  Lumbar puncture done, CSF not convincing for meningitis, so ampicillin, vancomycin, rocephin d/c'd on 01/22. However, pt was febrile on 11/23 to 101.3. Blood cultures repeated and restarted on Vanc and Zosyn. Repeat cultures NGTD. HSV PCR neg; Acyclovir discontinued. Prt has remained afebrile for > 72 hours. Vanc and Zosyn discontinued 01/26.   - Intracranial: patient with history of ICH with no functional deficits. Possibly multiple foci for seizure overtime. CT without acute changes, MRI brain with chronic changes and hypertensive angiopathy; no acute changes.  - PRES: Patient off cardene drip. MRI reviewed. Will follow blood pressure as patient is normally hypotensive.   - Neurology would like to repeat EEG to ensure that pt is not having any seizures. Finishing 15:30 on 01/27. Diffuse slowing, though no focal activity seen on  EEG. 01/28 patient's mental status is continuing to improve day to day, now following commands. Passed SBT 01/28. Plan for extubation 01/29 deferred in light of suspected LGIB. Keppra brought back to 500 mg bid. 01/30 Patient is alert and following commands. Passed SBT and extubated ~12:00 pm. Breathing well though with generalized weakness as well as weak cough. PT/OT/SLP ordered    - decreased keppra 500 daily due to weakness and somnolence   - repeating head CT   - Neurology reconsulted in concern for seizure and stroke.

## 2020-02-01 NOTE — PT/OT/SLP EVAL
Physical Therapy Evaluation    Patient Name:  Vaughn Retana   MRN:  [id number]    Recommendations:     Discharge Recommendations:  (SNF in a NH or return to retirement NH (Pending prognosis and family confirmation of PLOF))   Discharge Equipment Recommendations: (TBD)       Assessment:     Vaughn Retana is a 54 y.o. male admitted with a medical diagnosis of Seizure.  He presents with the following impairments/functional limitations:  weakness, impaired endurance, impaired self care skills, impaired functional mobilty, impaired balance, impaired cognition, decreased coordination, decreased upper extremity function, decreased lower extremity function, impaired cardiopulmonary response to activity. Pt lethargic (frequently falling asleep throughout session) but with increased alertness at the end of session. Pt did no follow commands and was non verbal. Pt with poor management of secretions that required frequent suctioning. Pt able to sit EOB x 5 minutes with total A progressing to a brief bout of SBA (purposeful vs good positioning). PT stretched neck into R rotation 2nd to tightness. Educated nursing to gently stretch into R rotation throughout the day. Will follow up with family next session about pt's true PLOF.      Recent Surgery: * No surgery found *      Plan:     During this hospitalization, patient to be seen 3 x/week to address the identified rehab impairments via gait training, therapeutic activities, therapeutic exercises, neuromuscular re-education and progress toward the following goals:    · Plan of Care Expires:  02/28/20    Subjective     Chief Complaint: unable to assess 2nd to impaired cognition   Patient/Family Comments/goals: unable to assess   Pain/Comfort:  · Pain Rating 1: (unable to assess 2nd to impaired cognition)  · Pain Addressed 1: Reposition, Distraction    Patients cultural, spiritual, [employer]al conflicts given the current situation: no    Living Environment:  Unknown   Pt  nonverbal and family not at bedside     Objective:     Communicated with RN prior to session.  Patient found HOB elevated with telemetry, pulse ox (continuous), blood pressure cuff, peripheral IV, NG tube  upon PT entry to room.    General Precautions: Standard, fall   Orthopedic Precautions:N/A   Braces: N/A     Exams:  · Cognitive Exam:  · Lethargic   · Nonverbal  · Not following commands  · Not tracking   · Pt with min decrease in PROM (tightness)  · Pt with L BKA  · Unable to assess strength 2nd to impaired cognition     Functional Mobility:  · Bed Mobility:     · Scooting: total assistance and of 2 persons  · Supine to Sit: total assistance and of 2 persons  · Sit to Supine: total assistance and of 2 persons  · Transfers: not performed 2nd to impaired cognition and impaired command following    · Gait: not performed       Therapeutic Activities and Exercises:  Educated pt on PT role/POC    Sitting EOB x 5 minutes with total A progressing to short bout of SBA   Pt unable to actively participate in therapy    AM-PAC 6 CLICK MOBILITY  Total Score:8     Patient left HOB elevated with all lines intact, call button in reach and RN notified.    GOALS:   Multidisciplinary Problems     Physical Therapy Goals        Problem: Physical Therapy Goal    Goal Priority Disciplines Outcome Goal Variances Interventions   Physical Therapy Goal     PT, PT/OT Ongoing, Progressing     Description:  Goals to be met by: 2020    Patient will increase functional independence with mobility by performin. Supine to sit with Moderate Assistance - not met  2. Rolling to Left with Moderate Assistance. - not met  3. Rolling to Right with Moderate Assistance - not met  4. Sit to stand transfer with Moderate Assistance - not met  5. Sitting at edge of bed x8 minutes with Contact Guard Assistance - not met  6. Lower extremity exercise program x15 reps per handout, with assistance as needed - not met                      History:      Past Medical History:   Diagnosis Date    Amputation stump pain 9/10/2013    Aspiration pneumonia 7/27/2015    Asterixis 11/8/2016    C. difficile colitis 8/7/2015    Cholelithiasis without obstruction 8/25/2015    Chronic diastolic heart failure     2-23-17   1 - Low normal to mildly depressed left ventricular systolic function (EF 50-55%).    2 - Right ventricular enlargement with mildly depressed systolic function.    3 - Left ventricular diastolic dysfunction.    4 - Right atrial enlargement.    5 - Severe tricuspid regurgitation.    6 - Pulmonary hypertension. The estimated PA systolic pressure is 86 mmHg.    7 - Increased central venous pressure.    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    Metabolic acidosis, IAG, reduced excretion of inorganic acids     Myoclonic jerking 9/20/2016    Noncompliance with medication regimen 12/4/2018    Secondary hyperparathyroidism (of renal origin)     Secondary pulmonary hypertension 3/23/2017    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    TB lung, latent 08/25/2015    Negative Quantiferon Gold 3-23-17       Past Surgical History:   Procedure Laterality Date    COLONOSCOPY      COLONOSCOPY N/A 4/4/2017    Procedure: COLONOSCOPY;  Surgeon: Walker Stern MD;  Location: [facility] ([address] [place] [address]);  Service: Endoscopy;  Laterality: N/A;  PA Systolic Pressure 85.56. HD Patient MWF, K+ lab  prior to procedure.     ESOPHAGOGASTRODUODENOSCOPY N/A 6/12/2018    Procedure: EGD (ESOPHAGOGASTRODUODENOSCOPY);  Surgeon: Man Galicia MD;  Location: [facility] (2ND FLR);  Service: Endoscopy;  Laterality: N/A;  EGD in 8-12 weeks with Dr. Galicia on 4th floor for follow up erosive esophagitis and Mejia's surveillance.    Patient should be on Pantoprazole 40mg every 12 hours or the equivulant of another PPI    awaiting for patient to reply back regarding changing    ESOPHAGOGASTRODUODENOSCOPY N/A 3/7/2019    Procedure: EGD (ESOPHAGOGASTRODUODENOSCOPY);  Surgeon: Man Galicia MD;  Location: [facility] (2ND FLR);  Service: Endoscopy;  Laterality: N/A;    ESOPHAGOGASTRODUODENOSCOPY N/A 9/23/2019    Procedure: EGD (ESOPHAGOGASTRODUODENOSCOPY);  Surgeon: Keanu Rainey MD;  Location: [facility] (2ND FLR);  Service: Endoscopy;  Laterality: N/A;    ESOPHAGOGASTRODUODENOSCOPY N/A 10/2/2019    Procedure: EGD (ESOPHAGOGASTRODUODENOSCOPY);  Surgeon: Kevin De La Paz MD;  Location: [facility] (2ND FLR);  Service: Endoscopy;  Laterality: N/A;    ESOPHAGOGASTRODUODENOSCOPY N/A 11/12/2019    Procedure: EGD (ESOPHAGOGASTRODUODENOSCOPY);  Surgeon: Kevin De La Paz MD;  [facility]  Service: Endoscopy;  Laterality: N/A;    FOOT AMPUTATION THROUGH METATARSAL      left foot    LEG AMPUTATION THROUGH KNEE  12/18/2013    left BKA    R AVF  9/12/12    UPPER GASTROINTESTINAL ENDOSCOPY         Time Tracking:     PT Received On: 02/01/20  PT Start Time: 1101     PT Stop Time: 1114  PT Total Time (min): 13 min     Billable Minutes: Evaluation 13    Sujatha Andrade, PT, DPT  2/1/2020  [phone]

## 2020-02-01 NOTE — ASSESSMENT & PLAN NOTE
CT head showed no acute changes  Patent follow commands but aphasic  Diffuse muscle weakness s.p extubation   Could be due to debility vs keppra vs patient baseline   Failed swallow eval   CK normal   Per nursing home patient was talkative and following commands prior to his admission   Off note patient has history of aphasia     - NPO and tube feed  - decreasing keppra to 500 daily   - repeat CT Head   - Neurology reconsulted in concern for seizure and stroke.

## 2020-02-01 NOTE — PLAN OF CARE
CMICU DAILY GOALS       A: Awake    RASS: Goal - RASS Goal: 0-->alert and calm  Actual - RASS (Maddox Agitation-Sedation Scale): -1-->drowsy   Restraint necessity: Clinical Justification: Removing medical devices  B: Breath   SBT: Not intubated   C: Coordinate A & B, analgesics/sedatives   Pain: managed    SAT: Not intubated  D: Delirium   CAM-ICU: Overall CAM-ICU: Negative  E: Early Mobility   MOVE Screen: Pass   Activity: Activity Management: bedrest maintained per order  FAS: Feeding/Nutrition   Diet order: Diet/Nutrition Received: tube feeding,   Fluid restriction:    T: Thrombus   DVT prophylaxis: VTE Required Core Measure: Pharmacological prophylaxis initiated/maintained  H: HOB Elevation   Head of Bed (HOB): HOB at 30-45 degrees  U: Ulcer Prophylaxis   GI: yes  G: Glucose control   managed Glycemic Management: blood glucose monitoring  S: Skin   Bundle compliance: yes   Bathing/Skin Care: bath, chlorhexidine, bath, complete, back care, incontinence care, dressed/undressed, linen changed Date: 1/31/2020 1700  B: Bowel Function   no issues   I: Indwelling Catheters   Arcos necessity: [REMOVED]      Urethral Catheter 01/20/20 1950 Straight-tip 16 Fr.-Reason for Continuing Urinary Catheterization: Critically ill in ICU requiring intensive monitoring   CVC necessity: No   IPAD offered: Not appropriate  D: De-escalation Antibx   N/A   Plan for the day   Pt unable to participate in speech/physical/occupational therapy due to decreased mental status and lethargy. Keppra believed to be the issue and discontinued from MAR. Plan is to step patient down tomorrow if he remains stable overnight  Family/Goals of care/Code Status   Code Status: Full Code     No acute events throughout day, VS and assessment per flow sheet, patient progressing towards goals as tolerated, plan of care reviewed with Vaughn Retana and family, all concerns addressed, will continue to monitor.

## 2020-02-01 NOTE — PLAN OF CARE
CMICU DAILY GOALS         A: Awake               RASS: Goal - RASS Goal: 0-->alert and calm  Actual - RASS (Maddox Agitation-Sedation Scale): -1-->drowsy              Restraint necessity: Clinical Justification: Removing medical devices  B: Breath              SBT: Not intubated   C: Coordinate A & B, analgesics/sedatives              Pain: managed               SAT: Not intubated  D: Delirium              CAM-ICU: Overall CAM-ICU: Negative  E: Early Mobility              MOVE Screen: Pass              Activity: Activity Management: bedrest maintained per order  FAS: Feeding/Nutrition              Diet order: Diet/Nutrition Received: tube feeding,   Fluid restriction:    T: Thrombus              DVT prophylaxis: VTE Required Core Measure: Pharmacological prophylaxis initiated/maintained  H: HOB Elevation              Head of Bed (HOB): HOB at 30-45 degrees  U: Ulcer Prophylaxis              GI: yes  G: Glucose control              managed Glycemic Management: blood glucose monitoring  S: Skin              Bundle compliance: yes   Bathing/Skin Care: bath, chlorhexidine, bath, complete, back care, incontinence care, dressed/undressed, linen changed Date: 2/1/2020 1700  B: Bowel Function              no issues   I: Indwelling Catheters              Arcos necessity: [REMOVED]      Urethral Catheter 01/20/20 1950 Straight-tip 16 Fr.-Reason for Continuing Urinary Catheterization: Critically ill in ICU requiring intensive monitoring              CVC necessity: No              IPAD offered: Not appropriate  D: De-escalation Antibx              N/A   Plan for the day              Neurology called to reassess patient for Decreased LOC. Stat CT ordered for unexplained decreased LOC. CT was clean, no acute changes noted. EEG started to assess for neurological changes that explain the patient's mental status. Pt may step down tomorrow if no acute events overnight.   Family/Goals of care/Code Status              Code Status:  Full Code                No acute events throughout day, VS and assessment per flow sheet, patient progressing towards goals as tolerated, plan of care reviewed with Vaugnh Retana and family, all concerns addressed, will continue to monitor.

## 2020-02-01 NOTE — SUBJECTIVE & OBJECTIVE
Interval History: Patient extubated, currently on room air, last HD yesterday. 600cc UF. Still acidotic with high anion gap.     Review of patient's allergies indicates:   Allergen Reactions    Fosrenol [lanthanum] Nausea And Vomiting     Nausea and vomiting     Current Facility-Administered Medications   Medication Frequency    acetaminophen tablet 650 mg Q6H PRN    carvedilol tablet 3.125 mg BID    dextrose 10% (D10W) Bolus PRN    glucagon (human recombinant) injection 1 mg PRN    heparin (porcine) injection 5,000 Units Q8H    levetiracetam oral soln Soln 500 mg QHS    midodrine tablet 5 mg Every Mon, Wed, Fri    pantoprazole injection 40 mg Q12H    sevelamer carbonate pwpk 1.6 g TID    sodium chloride 0.9% flush 10 mL PRN       Objective:     Vital Signs (Most Recent):  Temp: 98.3 °F (36.8 °C) (02/01/20 1100)  Pulse: 83 (02/01/20 1100)  Resp: (!) 29 (02/01/20 1100)  BP: 114/64 (02/01/20 1100)  SpO2: 97 % (02/01/20 1100)  O2 Device (Oxygen Therapy): room air (02/01/20 1100) Vital Signs (24h Range):  Temp:  [97.8 °F (36.6 °C)-99 °F (37.2 °C)] 98.3 °F (36.8 °C)  Pulse:  [] 83  Resp:  [16-33] 29  SpO2:  [95 %-100 %] 97 %  BP: ()/(41-70) 114/64     Weight: 57 kg (125 lb 10.6 oz) (01/29/20 1200)  Body mass index is 20.28 kg/m².  Body surface area is 1.63 meters squared.    I/O last 3 completed shifts:  In: 2105 [I.V.:175; Other:450; NG/GT:1280; IV Piggyback:200]  Out: 1430 [Drains:30; Other:1400]    Physical Exam   Constitutional: He appears well-nourished. He appears lethargic. He appears ill.   HENT:   Head: Normocephalic and atraumatic.   Mouth/Throat: No oropharyngeal exudate.   Eyes: Right eye exhibits no discharge. Left eye exhibits no discharge. No scleral icterus.   Neck: Normal range of motion. Neck supple. No JVD present.   Cardiovascular: Regular rhythm and normal heart sounds.   No murmur heard.  Pulmonary/Chest: He has rales.   Abdominal: Soft. Bowel sounds are normal. He exhibits no  distension.   Musculoskeletal: Normal range of motion. He exhibits deformity (LBKA). He exhibits no edema.   Neurological: He appears lethargic.   Skin: Skin is warm and dry.   Vitals reviewed.      Significant Labs:  CBC:   Recent Labs   Lab 02/01/20 0227   WBC 10.44   RBC 3.92*   HGB 11.1*   HCT 36.2*      MCV 92   MCH 28.3   MCHC 30.7*     CMP:   Recent Labs   Lab 02/01/20 0227      CALCIUM 9.8   ALBUMIN 2.6*   PROT 8.9*      K 4.8   CO2 18*      BUN 37*   CREATININE 5.1*   ALKPHOS 222*   ALT 26   AST 34   BILITOT 0.4

## 2020-02-01 NOTE — SUBJECTIVE & OBJECTIVE
Interval History/Significant Events: no cute events overnight     Review of Systems   Unable to perform ROS: Acuity of condition     Objective:     Vital Signs (Most Recent):  Temp: 98.9 °F (37.2 °C) (02/01/20 0701)  Pulse: 90 (02/01/20 0800)  Resp: 19 (02/01/20 0800)  BP: (!) 147/65 (02/01/20 0800)  SpO2: 98 % (02/01/20 0800) Vital Signs (24h Range):  Temp:  [97.8 °F (36.6 °C)-99 °F (37.2 °C)] 98.9 °F (37.2 °C)  Pulse:  [] 90  Resp:  [16-33] 19  SpO2:  [94 %-100 %] 98 %  BP: ()/(40-70) 147/65   Weight: 57 kg (125 lb 10.6 oz)  Body mass index is 20.28 kg/m².      Intake/Output Summary (Last 24 hours) at 2/1/2020 0836  Last data filed at 2/1/2020 0800  Gross per 24 hour   Intake 1375 ml   Output 1420 ml   Net -45 ml       Physical Exam   Constitutional: No distress.   SOMNOLENT    HENT:   Head: Normocephalic and atraumatic.   Mouth/Throat: No oropharyngeal exudate.   Eyes: Pupils are equal, round, and reactive to light.   Neck: No JVD present.   Cardiovascular: Normal rate, regular rhythm and normal heart sounds.   No murmur heard.  Pulmonary/Chest: Effort normal. No respiratory distress. He has no wheezes. He has rales (minimal inspiratory rales in bases).   Breathing comfortably, though creating copious secretions with occasional gurgling before suction   Abdominal: Soft. He exhibits no distension. There is no tenderness.   Musculoskeletal:   Left below knee amputation   Neurological: He is alert.   PERRL. Alert. Following some commands. Generalized weakness, notably weak cough. Moving all limbs on command. Aphasic    Skin: Skin is warm and dry. Capillary refill takes less than 2 seconds. He is not diaphoretic.   Vitals reviewed.      Vents:  Vent Mode: Spont (01/30/20 1205)  Ventilator Initiated: Yes (01/20/20 1327)  Set Rate: 12 BPM (01/30/20 0739)  Vt Set: 340 mL (01/24/20 1403)  Pressure Support: 7 cmH20 (01/30/20 1205)  PEEP/CPAP: 5 cmH20 (01/30/20 1205)  Oxygen Concentration (%): 21 (01/30/20  1205)  Peak Airway Pressure: 12 cmH2O (01/30/20 1205)  Plateau Pressure: 17 cmH20 (01/30/20 1205)  Total Ve: 5.67 mL (01/30/20 1205)  F/VT Ratio<105 (RSBI): (!) 33.33 (01/30/20 1205)  Lines/Drains/Airways     Drain                 Hemodialysis AV Fistula Right upper arm -- days         Hemodialysis AV Fistula Right upper arm -- days         NG/OG Tube 01/20/20 1932 [place] sump Right nostril 11 days          Peripheral Intravenous Line                 Peripheral IV - Single Lumen 01/28/20 0905 18 G;1 3/4 in Left Upper Arm 3 days         Peripheral IV - Single Lumen 01/30/20 1910 22 G Anterior;Distal;Left Wrist 1 day              Significant Labs:    CBC/Anemia Profile:  Recent Labs   Lab 01/30/20  2100 01/31/20  0303 02/01/20  0227   WBC 9.03 7.28 10.44   HGB 10.0* 11.2* 11.1*   HCT 32.2* 36.6* 36.2*   * 321 272   MCV 90 91 92   RDW 16.0* 16.3* 16.4*        Chemistries:  Recent Labs   Lab 01/30/20  2100 01/31/20  0500 02/01/20  0227    138 139   K 4.2 4.2 4.8    102 104   CO2 17* 19* 18*   BUN 67* 72* 37*   CREATININE 7.6* 8.4* 5.1*   CALCIUM 10.0 9.9 9.8   ALBUMIN 2.6* 2.5* 2.6*   PROT 8.9* 8.5* 8.9*   BILITOT 0.4 0.4 0.4   ALKPHOS 231* 228* 222*   ALT 31 28 26   AST 34 30 34   MG 2.6 2.7* 2.3   PHOS  --  6.0* 4.5           Significant Imaging:  I have reviewed all pertinent imaging results/findings within the past 24 hours.

## 2020-02-01 NOTE — PLAN OF CARE
CMICU DAILY GOALS         A: Awake               RASS: Goal - RASS Goal: 0-->alert and calm  Actual - RASS (Maddox Agitation-Sedation Scale): -1-->drowsy              Restraint necessity: NA  B: Breath              SBT: Not intubated   C: Coordinate A & B, analgesics/sedatives              Pain: managed               SAT: Not intubated  D: Delirium              CAM-ICU: Overall CAM-ICU: Negative  E: Early Mobility              MOVE Screen: Pass              Activity: Activity Management: activity adjusted per tolerance  FAS: Feeding/Nutrition              Diet order: Diet/Nutrition Received: tube feeding,   Fluid restriction:    T: Thrombus              DVT prophylaxis: VTE Required Core Measure: Pharmacological prophylaxis initiated/maintained  H: HOB Elevation              Head of Bed (HOB): HOB at 30 degrees  U: Ulcer Prophylaxis              GI: yes  G: Glucose control              managed Glycemic Management: blood glucose monitoring  S: Skin              Bundle compliance: yes   Bathing/Skin Care: back care Date: 01/31/2020 at day shift    B: Bowel Function              no issues   I: Indwelling Catheters              Arcos necessity: No; Pt is anuric              CVC necessity: No              IPAD offered: Yes  D: De-escalation Antibx              Yes  Plan for the day              For speech therapy evaluation ; For possible step down today  Family/Goals of care/Code Status              Code Status: Full Code                No acute events throughout day, VS and assessment per flow sheet, patient progressing towards goals as tolerated, plan of care reviewed with Vaughn Retana and family, all concerns addressed, will continue to monitor.

## 2020-02-01 NOTE — PT/OT/SLP EVAL
Occupational Therapy   Evaluation    Name: Vaughn Retana  MRN: [id number]  Admitting Diagnosis:  Seizure      Recommendations:     Discharge Recommendations: (SNF in a NH or return to [facility]--Pending prognosis and family confirmation of PLOF)  Discharge Equipment Recommendations:  (TBD)  Barriers to discharge:  (increased assistance needed)    Assessment:     Vaughn Retana is a 54 y.o. male with a medical diagnosis of Seizure.  He presents with performance deficits including weakness, impaired endurance, impaired self care skills, impaired functional mobilty, impaired balance, decreased coordination, impaired cognition, decreased upper extremity function, decreased lower extremity function, decreased safety awareness, abnormal tone, decreased ROM, impaired cardiopulmonary response to activity. Pt nonverbal and unable to demo ability to follow simple commands this date, eyes closed throughout majority of session. Pt would continue to benefit from OT to increase functional independence.     Rehab Prognosis: Fair; patient would benefit from acute skilled OT services to address these deficits and reach maximum level of function.       Plan:     Patient to be seen 3 x/week to address the above listed problems via self-care/home management, therapeutic activities, therapeutic exercises, neuromuscular re-education  · Plan of Care Expires: 03/01/20  · Plan of Care Reviewed with: patient    Subjective     Chief Complaint: Unable to state  Patient/Family Comments/goals: Unable to state    Occupational Profile:  Living Environment: Pt unable to provide history, nonverbal at this time. Per chart review, pt is a resident of [facility] and is communicative and ambulatory at baseline-- pt with history of L BKA.  Previous level of function: Needs to be verified  Equipment Used at Home:  (unknown)  Assistance upon Discharge: NH staff    Pain/Comfort:  · Pain Rating 1: (None indicated)    Patients cultural, spiritual,  Sikh conflicts given the current situation: no    Objective:     Communicated with: RN prior to session. Patient found with HOB elevated with telemetry, blood pressure cuff, peripheral IV, NG tube, pulse ox (continuous), pressure relief boots upon OT entry to room, no family present.    General Precautions: Standard, fall(L BKA)   Orthopedic Precautions:N/A   Braces: N/A     Occupational Performance:    Bed Mobility:    · Patient completed Supine to Sit with total assistance and 2 persons  · Patient completed Sit to Supine with total assistance and 2 persons    Functional Mobility/Transfers:  · Unable to attempt at this time    Activities of Daily Living:  · Total A-dependent at this time    Cognitive/Visual Perceptual:  Cognitive/Psychosocial Skills:     -       Oriented to: GONZALO  -       Follows Commands/attention: unable to follow simple commands  -       Communication: nonverbal  -       Safety awareness/insight to disability: impaired  Visual/Perceptual: GONZALO    Physical Exam:  Balance:    -       poor sitting balance-- Total A for ~5 min with brief period of close SBA  Pt with rounded shoulders, head forward; demo tightness with attempted cervical rotation  Pt unable to demo active movement of UEs on command, WFL PROM    AMPAC 6 Click ADL:  AMPAC Total Score: 6    Treatment & Education:  OT eval; educated on OT role and POC and pt with no evidence of learning  Education:    Patient left HOB elevated with all lines intact, call button in reach and RN present    GOALS:   Multidisciplinary Problems     Occupational Therapy Goals        Problem: Occupational Therapy Goal    Goal Priority Disciplines Outcome Interventions   Occupational Therapy Goal     OT, PT/OT Ongoing, Progressing    Description:  Goals to be met by: 2 weeks (2/15/20)     Patient will increase functional independence with ADLs by performing:    Grooming while seated with Moderate Assistance.  Sitting at edge of bed x8 minutes with Contact  Guard Assistance.  Supine to sit with Moderate Assistance.  Squat pivot transfers with Maximum Assistance.  Increased functional strength to WFL for ADLs and mobility tasks.  Pt will follow 3/3 one-step commands to participate in ADL task.                      History:     Past Medical History:   Diagnosis Date    Amputation stump pain 9/10/2013    Aspiration pneumonia 7/27/2015    Asterixis 11/8/2016    C. difficile colitis 8/7/2015    Cholelithiasis without obstruction 8/25/2015    Chronic diastolic heart failure     2-23-17   1 - Low normal to mildly depressed left ventricular systolic function (EF 50-55%).    2 - Right ventricular enlargement with mildly depressed systolic function.    3 - Left ventricular diastolic dysfunction.    4 - Right atrial enlargement.    5 - Severe tricuspid regurgitation.    6 - Pulmonary hypertension. The estimated PA systolic pressure is 86 mmHg.    7 - Increased central venous pressure.    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    Metabolic acidosis, IAG, reduced excretion of inorganic acids     Myoclonic jerking 9/20/2016    Noncompliance with medication regimen 12/4/2018    Secondary hyperparathyroidism (of renal origin)     Secondary pulmonary hypertension 3/23/2017    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    TB lung, latent 08/25/2015    Negative Quantiferon Gold 3-23-17        Past Surgical History:   Procedure Laterality Date    COLONOSCOPY      COLONOSCOPY N/A 4/4/2017    Procedure: COLONOSCOPY;  Surgeon: Walker Stern MD;  Location: [facility] (2ND FLR);  Service: Endoscopy;  Laterality: N/A;  PA Systolic Pressure 85.56. HD Patient MWF, K+ lab prior to procedure.     ESOPHAGOGASTRODUODENOSCOPY N/A 6/12/2018    Procedure: EGD (ESOPHAGOGASTRODUODENOSCOPY);  Surgeon: Man Galicia MD;  [facility]  Service: Endoscopy;  Laterality: N/A;  EGD in 8-12 weeks with Dr. Galicia on 4th floor for follow up erosive esophagitis and Mejia's surveillance.    Patient should be on Pantoprazole 40mg every 12 hours or the equivulant of another PPI    awaiting for patient to reply back regarding changing    ESOPHAGOGASTRODUODENOSCOPY N/A 3/7/2019    Procedure: EGD (ESOPHAGOGASTRODUODENOSCOPY);  Surgeon: Man Galicia MD;  [facility]  Service: Endoscopy;  Laterality: N/A;    ESOPHAGOGASTRODUODENOSCOPY N/A 9/23/2019    Procedure: EGD (ESOPHAGOGASTRODUODENOSCOPY);  Surgeon: Keanu Rainey MD;  [facility]  Service: Endoscopy;  Laterality: N/A;    ESOPHAGOGASTRODUODENOSCOPY N/A 10/2/2019    Procedure: EGD (ESOPHAGOGASTRODUODENOSCOPY);  Surgeon: Kevin De La Paz MD;  [facility]  Service: Endoscopy;  Laterality: N/A;    ESOPHAGOGASTRODUODENOSCOPY N/A 11/12/2019    Procedure: EGD (ESOPHAGOGASTRODUODENOSCOPY);  Surgeon: Kevin De La Paz MD;  [facility]  Service: Endoscopy;  Laterality: N/A;    FOOT AMPUTATION THROUGH METATARSAL      left foot    LEG AMPUTATION THROUGH KNEE  12/18/2013    left BKA    R AVF  9/12/12    UPPER GASTROINTESTINAL ENDOSCOPY         Time Tracking:     OT Date of Treatment: 02/01/20  OT Start Time: 1057  OT Stop Time: 1110  OT Total Time (min): 13 min    Billable Minutes:Evaluation 13 minutes    WILBUR Richardson  2/1/2020

## 2020-02-01 NOTE — ASSESSMENT & PLAN NOTE
Last echo done at [facility] 8/2/19, EF>55%, bi-atrial enlargement  No signes of exacerbation

## 2020-02-01 NOTE — PROGRESS NOTES
[facility]  Nephrology  Progress Note    Patient Name: Vaughn Retana  MRN: [id number]  Admission Date: 1/20/2020  Hospital Length of Stay: 12 days  Attending Provider: Ricky Morgan MD   Primary Care Physician: Cori Randall MD  Principal Problem:Seizure    Subjective:     HPI: The patient is a 54 year-old AAM with a history of ESRD (MWF dialysis), L BKA 2/2 osteo, CHF, GERD, multiple ICH without residual deficits, gastroparesis, esophagitis, and numerous episodes of hematemesis and GIBs (last EGD 11/12/19, esophagitis) who presented to the ED from [facility] on 1/20 due to altered mental status. The patient is currently intubated, therefore, HPI will come from Epic charting. According to the notes, the patient was transferred here after being found Monday morning (1/20) confused and lethargic with a moderate amount of brown, colored emesis at bedside by the nursing home staff. The nursing staff reported that he appeared normal the day before. In the ED, initial work up was found to be unremarkable. UA negative for UTI. Lactic acid was mildly elevated at 2.4. Alcohol and drug screen negative. The patient underwent a CTH, which showed no acute intraparenchymal hemorrhage or major vascular distribution, senescent changes, and remote lacunar type basal ganglia infarct. However, after the CT, the patient had a episode of emesis (?coffee ground) and a witnessed tonic clonic seizure. He was also noted to be hypertensive (SBP >200s). The patient was intubated for airway protection and started on propofol and Cardene drip for hypertension (now off). He was also given Ativan and Keppra in the ED. He is now on a continuous EEG. GI was consulted for a UGI bleed. The patient's Hgb is currently 13.9. GI with no plans for endoscopic interventions at this time.     The patient was last dialyze on Friday (1/17) at his outpatient dialysis unit. He is a MWF dialysis patient of Dr. Lemus at [facility].  According to the records, he typically dialyzes for 3.5 hrs per a RICHARD AVF. Nephrology consulted for management of ESRD and HD treatment.    Interval History: Patient extubated, currently on room air, last HD yesterday. 600cc UF. Still acidotic with high anion gap.     Review of patient's allergies indicates:   Allergen Reactions    Fosrenol [lanthanum] Nausea And Vomiting     Nausea and vomiting     Current Facility-Administered Medications   Medication Frequency    acetaminophen tablet 650 mg Q6H PRN    carvedilol tablet 3.125 mg BID    dextrose 10% (D10W) Bolus PRN    glucagon (human recombinant) injection 1 mg PRN    heparin (porcine) injection 5,000 Units Q8H    levetiracetam oral soln Soln 500 mg QHS    midodrine tablet 5 mg Every Mon, Wed, Fri    pantoprazole injection 40 mg Q12H    sevelamer carbonate pwpk 1.6 g TID    sodium chloride 0.9% flush 10 mL PRN       Objective:     Vital Signs (Most Recent):  Temp: 98.3 °F (36.8 °C) (02/01/20 1100)  Pulse: 83 (02/01/20 1100)  Resp: (!) 29 (02/01/20 1100)  BP: 114/64 (02/01/20 1100)  SpO2: 97 % (02/01/20 1100)  O2 Device (Oxygen Therapy): room air (02/01/20 1100) Vital Signs (24h Range):  Temp:  [97.8 °F (36.6 °C)-99 °F (37.2 °C)] 98.3 °F (36.8 °C)  Pulse:  [] 83  Resp:  [16-33] 29  SpO2:  [95 %-100 %] 97 %  BP: ()/(41-70) 114/64     Weight: 57 kg (125 lb 10.6 oz) (01/29/20 1200)  Body mass index is 20.28 kg/m².  Body surface area is 1.63 meters squared.    I/O last 3 completed shifts:  In: 2105 [I.V.:175; Other:450; NG/GT:1280; IV Piggyback:200]  Out: 1430 [Drains:30; Other:1400]    Physical Exam   Constitutional: He appears well-nourished. He appears lethargic. He appears ill.   HENT:   Head: Normocephalic and atraumatic.   Mouth/Throat: No oropharyngeal exudate.   Eyes: Right eye exhibits no discharge. Left eye exhibits no discharge. No scleral icterus.   Neck: Normal range of motion. Neck supple. No JVD present.   Cardiovascular: Regular  rhythm and normal heart sounds.   No murmur heard.  Pulmonary/Chest: He has rales.   Abdominal: Soft. Bowel sounds are normal. He exhibits no distension.   Musculoskeletal: Normal range of motion. He exhibits deformity (LBKA). He exhibits no edema.   Neurological: He appears lethargic.   Skin: Skin is warm and dry.   Vitals reviewed.      Significant Labs:  CBC:   Recent Labs   Lab 02/01/20 0227   WBC 10.44   RBC 3.92*   HGB 11.1*   HCT 36.2*      MCV 92   MCH 28.3   MCHC 30.7*     CMP:   Recent Labs   Lab 02/01/20 0227      CALCIUM 9.8   ALBUMIN 2.6*   PROT 8.9*      K 4.8   CO2 18*      BUN 37*   CREATININE 5.1*   ALKPHOS 222*   ALT 26   AST 34   BILITOT 0.4            Assessment/Plan:     * Seizure  - per primary team     ESRD on dialysis  The patient is a 53 yo AAM admitted to MICU with seizure activity after presenting to the ED on 1/20 after being found with alter mental status at his NH facility Monday morning. The patient is currently on continuous EEG monitoring. Last dialyze on MWF.      Nephrology History  iHD Schedule: MWF  Unit/MD: [facility]/ Dr. Lemus  Duration: 3.5 hrs  EDW: ?  Access: RICHARD AVF   Resodial Renal Function: Yes (per records)     Plan:   - no emergent need for RRT today  - will repeat ABG and lactic acid given unexplained high anion gap   - Daily renal function panels   - Renal diet or Novasource TF   - On mechanical ventilation with FiO at 21%  - Continue to monitor intake and output, daily weights   - Avoid nephrotoxic medication and renal dose medications to GFR  - Will discuss with Dr. Forte         Thank you for your consult. I will follow-up with patient. Please contact us if you have any additional questions.    Trev Enrique MD  Nephrology  [facility]    ATTENDING PHYSICIAN ATTESTATION  I have personally verified the history and examined the patient. I thoroughly reviewed the demographic, clinical, laboratorial and imaging  information available in medical records. I agree with the assessment and recommendations provided by the subspecialty resident who was under my supervision.

## 2020-02-02 NOTE — NURSING TRANSFER
Nursing Transfer Note      2/2/2020     Transfer  To [facility] [id number]    Transfer via bed    Transfer with Tele monitor, EEG, CHART    Transported by Janey GUEVARA and Gavi South sent: n/a    Chart send with patient: YES    Notified: N/A    Patient reassessed at: 2/1/20@1159    Upon arrival to floor:  Bedside handoff and pt assess, vss.

## 2020-02-02 NOTE — SUBJECTIVE & OBJECTIVE
Interval History/Significant Events: no cute events overnight, CT head showed no acute events     Review of Systems   Unable to perform ROS: Acuity of condition     Objective:     Vital Signs (Most Recent):  Temp: 98.5 °F (36.9 °C) (02/02/20 0701)  Pulse: 80 (02/02/20 0800)  Resp: 15 (02/02/20 0800)  BP: (!) 153/64 (02/02/20 0800)  SpO2: 100 % (02/02/20 0800) Vital Signs (24h Range):  Temp:  [97.9 °F (36.6 °C)-98.6 °F (37 °C)] 98.5 °F (36.9 °C)  Pulse:  [76-89] 80  Resp:  [11-29] 15  SpO2:  [95 %-100 %] 100 %  BP: ()/(46-76) 153/64   Weight: 57 kg (125 lb 10.6 oz)  Body mass index is 20.28 kg/m².      Intake/Output Summary (Last 24 hours) at 2/2/2020 0857  Last data filed at 2/2/2020 0800  Gross per 24 hour   Intake 770 ml   Output 70 ml   Net 700 ml       Physical Exam   Constitutional: No distress.   SOMNOLENT    HENT:   Head: Normocephalic and atraumatic.   Mouth/Throat: No oropharyngeal exudate.   Eyes: Pupils are equal, round, and reactive to light.   Neck: No JVD present.   Cardiovascular: Normal rate, regular rhythm and normal heart sounds.   No murmur heard.  Pulmonary/Chest: Effort normal. No respiratory distress. He has no wheezes. He has rales (minimal inspiratory rales in bases).   Breathing comfortably, though creating copious secretions with occasional gurgling before suction   Abdominal: Soft. He exhibits no distension. There is no tenderness.   Musculoskeletal:   Left below knee amputation   Neurological: He is alert.   PERRL. Alert. Following some commands. Generalized weakness, notably weak cough. Moving all limbs on command. Patient was able to say few words    Skin: Skin is warm and dry. Capillary refill takes less than 2 seconds. He is not diaphoretic.   Vitals reviewed.      Vents:  Vent Mode: Spont (01/30/20 1205)  Ventilator Initiated: Yes (01/20/20 1327)  Set Rate: 12 BPM (01/30/20 0739)  Vt Set: 340 mL (01/24/20 1403)  Pressure Support: 7 cmH20 (01/30/20 1205)  PEEP/CPAP: 5 cmH20  (01/30/20 1205)  Oxygen Concentration (%): 21 (01/30/20 1205)  Peak Airway Pressure: 12 cmH2O (01/30/20 1205)  Plateau Pressure: 17 cmH20 (01/30/20 1205)  Total Ve: 5.67 mL (01/30/20 1205)  F/VT Ratio<105 (RSBI): (!) 33.33 (01/30/20 1205)  Lines/Drains/Airways     Drain                 Hemodialysis AV Fistula Right upper arm -- days         Hemodialysis AV Fistula Right upper arm -- days         NG/OG Tube 01/20/20 1932 Glynn sump Right nostril 12 days          Peripheral Intravenous Line                 Peripheral IV - Single Lumen 01/28/20 0905 18 G;1 3/4 in Left Upper Arm 4 days         Peripheral IV - Single Lumen 01/30/20 1910 22 G Anterior;Distal;Left Wrist 2 days         Peripheral IV - Single Lumen 02/02/20 0300 22 G Anterior;Left Forearm less than 1 day              Significant Labs:    CBC/Anemia Profile:  Recent Labs   Lab 02/01/20 0227 02/02/20  0320   WBC 10.44 8.96   HGB 11.1* 10.5*   HCT 36.2* 34.2*    298   MCV 92 92   RDW 16.4* 16.1*        Chemistries:  Recent Labs   Lab 02/01/20 0227 02/02/20  0320    139   K 4.8 4.0    104   CO2 18* 18*   BUN 37* 57*   CREATININE 5.1* 7.1*   CALCIUM 9.8 9.6   ALBUMIN 2.6* 2.5*   PROT 8.9* 8.6*   BILITOT 0.4 0.3   ALKPHOS 222* 216*   ALT 26 20   AST 34 22   MG 2.3 2.4   PHOS 4.5 4.7*           Significant Imaging:  I have reviewed all pertinent imaging results/findings within the past 24 hours.

## 2020-02-02 NOTE — PLAN OF CARE
Problem: Adult Inpatient Plan of Care  Goal: Plan of Care Review  Outcome: Ongoing, Not Progressing     Problem: Diabetes Comorbidity  Goal: Blood Glucose Level Within Desired Range  Outcome: Ongoing, Not Progressing   POC reviewed with patient . Pt verbalized understanding. Pt transferred from the [facility].  Pt oriented to room. VSS. Suction at bedside. PT needs to be suctioned Q4hr.  Frequent oral care performed. Tube feeding restart. All questions and concerns addressed. Safety and fall precautions in place. TM

## 2020-02-02 NOTE — ASSESSMENT & PLAN NOTE
Altered mental status     Patient initially presented to ED prior to getting dialysis due to AMS and brown emesis. Had witnessed tonic clonic seizure requiring ativan shortly after getting a CT head. He was loaded with 1500 mg Keppra, intubated for airway protection, and started on propofol for sedation. Upon physical exam, patient remained unresponsive to verbal or tactile stimuli and had upward left sided gaze with sluggish pupils. Concern for status epilepticus. Differential diagnosis includes polysubstance, sepsis, intracranial abnormalities, and PRES. 01/29 mental status continues to slowly improve daily.    - Spot EEG without evidence of current seizure. However patient already received keppra, ativan, and sedated on propofol. 24 hour EEG with L sided structural lesion and underlying epileptiform potential. Initiated 1500 mg keppra on 01/22, per Neurology recs.  - Substance induced: UDS negative, alcohol level wnl  - Sepsis: Initial lactate 2.4, likely due to seizure event; repeat was 1.9. Patient received 500 ml saline in ED. Initial blood cultures, sputum culture+gram stain neg  Lumbar puncture done, CSF not convincing for meningitis, so ampicillin, vancomycin, rocephin d/c'd on 01/22. However, pt was febrile on 11/23 to 101.3. Blood cultures repeated and restarted on Vanc and Zosyn. Repeat cultures NGTD. HSV PCR neg; Acyclovir discontinued. Prt has remained afebrile for > 72 hours. Vanc and Zosyn discontinued 01/26.   - Intracranial: patient with history of ICH with no functional deficits. Possibly multiple foci for seizure overtime. CT without acute changes, MRI brain with chronic changes and hypertensive angiopathy; no acute changes.  - PRES: Patient off cardene drip. MRI reviewed. Will follow blood pressure as patient is normally hypotensive.   - Neurology would like to repeat EEG to ensure that pt is not having any seizures. Finishing 15:30 on 01/27. Diffuse slowing, though no focal activity seen on  EEG. 01/28 patient's mental status is continuing to improve day to day, now following commands. Passed SBT 01/28. Plan for extubation 01/29 deferred in light of suspected LGIB. Keppra brought back to 500 mg bid. 01/30 Patient is alert and following commands. Passed SBT and extubated ~12:00 pm. Breathing well though with generalized weakness as well as weak cough. PT/OT/SLP ordered    Mental status improving, patient was able to say few words 2/2/2020  - decreased keppra 500 daily due to weakness and somnolence   - head CT repeat showed no acute changes   - Neurology re contacted in concern for seizure and stroke, recommended repeat EEG and they will follow along   - follow up with neurology

## 2020-02-02 NOTE — SUBJECTIVE & OBJECTIVE
Subjective:     Interval History:   Mental status improved overnight, more awake and alert today  EEG with slowing more consistent with encephalopathy, no seizure.     Current Neurological Medications:   Keppra 250 mg BID    Current Facility-Administered Medications   Medication Dose Route Frequency Provider Last Rate Last Dose    acetaminophen tablet 650 mg  650 mg Oral Q4H PRN Mariposa Dalton MD        albuterol-ipratropium 2.5 mg-0.5 mg/3 mL nebulizer solution 3 mL  3 mL Nebulization Q4H PRN Mariposa Dalton MD        carvedilol tablet 3.125 mg  3.125 mg Per OG tube BID Ricky Morgan MD   3.125 mg at 02/02/20 0818    dextrose 10% (D10W) Bolus  12.5 g Intravenous PRN Mariposa Dalton MD        dextrose 10% (D10W) Bolus  25 g Intravenous PRN Mariposa Dalton MD        glucagon (human recombinant) injection 1 mg  1 mg Intramuscular PRN Mariposa Dalton MD        glucose chewable tablet 16 g  16 g Oral PRN Mariposa Dalton MD        glucose chewable tablet 24 g  24 g Oral PRN Mariposa RUDY Dalton MD        heparin (porcine) injection 5,000 Units  5,000 Units Subcutaneous Q8H Bharat Ramirez, NP   5,000 Units at 02/02/20 0530    levetiracetam oral soln Soln 500 mg  500 mg Per NG tube QHS Bharat Ramirez, NP   500 mg at 02/01/20 2104    midodrine tablet 5 mg  5 mg Per NG tube Every Mon, Wed, Fri Yimi Taylor MD   5 mg at 01/31/20 0822    ondansetron injection 4 mg  4 mg Intravenous Q8H PRN Mariposa Dalton MD        pantoprazole injection 40 mg  40 mg Intravenous Q12H Everardo Chen MD   40 mg at 02/02/20 0818    polyethylene glycol packet 17 g  17 g Oral BID PRN Mariposa Dalton MD        prochlorperazine injection Soln 5 mg  5 mg Intravenous Q6H PRN Mariposa Dalton MD        senna-docusate 8.6-50 mg per tablet 1 tablet  1 tablet Oral Daily PRN Mariposa Dalton MD        sevelamer carbonate pwpk 1.6 g  1.6 g Per OG tube TID Bryan Merrill MD   1.6 g at 02/02/20 0818    sodium chloride  0.9% flush 10 mL  10 mL Intravenous PRN Mariposa Dalton MD         Review of Systems   Constitutional: Negative for fatigue.   Eyes: Negative for visual disturbance.   Respiratory: Negative for cough.    Musculoskeletal: Negative for neck pain.   Neurological: Negative for headaches.     Objective:     Vital Signs (Most Recent):  Temp: 96.8 °F (36 °C) (02/02/20 1159)  Pulse: 83 (02/02/20 1159)  Resp: 18 (02/02/20 1159)  BP: 131/63 (02/02/20 1159)  SpO2: 98 % (02/02/20 1159) Vital Signs (24h Range):  Temp:  [96.8 °F (36 °C)-98.6 °F (37 °C)] 96.8 °F (36 °C)  Pulse:  [77-89] 83  Resp:  [11-24] 18  SpO2:  [95 %-100 %] 98 %  BP: (124-173)/(55-76) 131/63     Weight: 57 kg (125 lb 10.6 oz)  Body mass index is 20.28 kg/m².    Physical Exam   Constitutional: No distress.   Pulmonary/Chest:   Intubated      Skin: He is not diaphoretic.   Psychiatric: His speech is normal.       NEUROLOGICAL EXAMINATION:     MENTAL STATUS   Speech: speech is normal   Level of consciousness: alert ,  drowsy    CRANIAL NERVES     CN III, IV, VI   Nystagmus: none   Ophthalmoparesis: none    CN VIII   Hearing: intact       Tracks room  Attends to voice   Face symmetric at rest      MOTOR EXAM   Muscle bulk: normal  Overall muscle tone: normal       Squeezes hand and wiggles toes      GAIT AND COORDINATION     Tremor   Resting tremor: absent    Significant Labs:   CBC:   Recent Labs   Lab 02/01/20 0227 02/02/20  0320   WBC 10.44 8.96   HGB 11.1* 10.5*   HCT 36.2* 34.2*    298     CMP:   Recent Labs   Lab 02/01/20 0227 02/02/20  0320    148*    139   K 4.8 4.0    104   CO2 18* 18*   BUN 37* 57*   CREATININE 5.1* 7.1*   CALCIUM 9.8 9.6   MG 2.3 2.4   PROT 8.9* 8.6*   ALBUMIN 2.6* 2.5*   BILITOT 0.4 0.3   ALKPHOS 222* 216*   AST 34 22   ALT 26 20   ANIONGAP 17* 17*   EGFRNONAA 11.8* 7.9*     Inflammatory Markers: No results for input(s): SEDRATE, CRP, PROCAL in the last 48 hours.  Respiratory Culture: No results for  input(s): GSRESP, RESPIRATORYC in the last 48 hours.  Urine Culture: No results for input(s): LABURIN in the last 48 hours.  Urine Studies: No results for input(s): COLORU, APPEARANCEUA, PHUR, SPECGRAV, PROTEINUA, GLUCUA, KETONESU, BILIRUBINUA, OCCULTUA, NITRITE, UROBILINOGEN, LEUKOCYTESUR, RBCUA, WBCUA, BACTERIA, SQUAMEPITHEL, HYALINECASTS in the last 48 hours.    Invalid input(s): WRIGHTSUR  All pertinent lab results from the past 24 hours have been reviewed.    vEEG (1/27-1/28/20): 23 hr 54 min  This is an abnormal continuous EEG monitoring study because of multifocal slowing with occasional epileptiform discharges in the left frontotemporal region consistent with multiple areas of focal cortical dysfunction and a potential seizure focus.  There are bursts of frontally predominant generalized slowing consistent with subcortical or deep midline dysfunction.  There are no electrographic seizures during this recording session.  Compared to the previous day's recording, focal slowing with epileptiform potential is more apparent on this recording session.    Significant Imaging: I have reviewed and interpreted all pertinent imaging results/findings within the past 24 hours.     MRI Brain WO contrast (1/20/20):  1. No acute intracranial abnormality identified.  2. Remote microhemorrhages throughout the brain in a predominantly central distribution suggesting hypertensive microangiopathy.  3. Chronic microvascular ischemic change.

## 2020-02-02 NOTE — PROCEDURES
DATE OF SERVICE:  2/1/20     ADMITTING/REQUESTING PROVIDER: Dr. Handley     REASON FOR CONSULT:  54-year-old man with complicated medical comorbidities with episode of shaking and decreased responsiveness.  Evaluate for evidence of epileptiform activity.     METHODOLOGY              Electroencephalographic (EEG) recording is with electrodes placed according to the International 10-20 placement system.  Thirty two (32) channels of digital signal (sampling rate of 512/sec) including T1 and T2 was simultaneously recorded from the scalp and may include  EKG, EMG, and/or eye monitors.  Recording band pass was 0.1 to 512 hz.  Digital video recording of the patient is simultaneously recorded with the EEG.  The patient is instructed report clinical symptoms which may occur during the recording session.  EEG and video recording is stored and archived in digital format.  Activation procedures which include photic stimulation, hyperventilation and instructing patients to perform simple task are done in selected patients.              The EEG is displayed on a monitor screen and can be reviewed using different montages.  Computer assisted analysis is employed to detect spike and electrographic seizure activity.   The entire record is submitted for computer analysis.  The entire recording is visually reviewed and the times identified by computer analysis as being spikes or seizures are reviewed again.  Compresses spectral analysis (CSA) is also performed on the activity recorded from each individual channel.  This is displayed as a power display of frequencies from 0 to 30 Hz over time.   The CSA is reviewed looking for asymmetries in power between homologous areas of the scalp and then compared with the original EEG recording.                Adreal software is also utilized in the review of this study.  This software suite analyzes the EEG recording in multiple domains.  Coherence and rhythmicity is computed to identify EEG  sections which may contain organized seizures.  Each channel undergoes analysis to detect presence of spike and sharp waves which have special and morphological characteristic of epileptic activity.  The routine EEG recording is converted from spacial into frequency domain.  This is then displayed comparing homologous areas to identify areas of significant asymmetry.  Algorithm to identify non-cortically generated artifact is used to separate eye movement, EMG and other artifact from the EEG.       RECORDING TIMES  Start on February 1, 2000 20 hr 14 min 56 sec 50  End on February 2, 2020 at hours 7 min 13 sec 49  The total time of VEEG recording for the study was 16 hr and 2 min     EEG FINDINGS  Background activity:   The recording was obtained with a number standard bipolar referential montages during lethargic state.  The state the background was mildly disorganized with a nonsustained posterior dominant rhythm of 8 hertz which was symmetric.  Mixed theta range slowing was noted throughout the record which was diffuse.  In addition burst of generalized synchronous semi rhythmical medium to high amplitude delta activity were noted throughout the record.  Generalized periods of suppression lasting up to 1 sec were also noted     There are no pushbutton activations.     Sleep:  Symmetric sleep spindles and vertex waves were noted.    Activation procedures:   Hyperventilation is not performed  Photic stimulation is not performed     Cardiac Monitor:   Sinus rhythm with occasional PVCs were noted     Impression:   The patient is a 54-year-old male who presented with PRES and hypertensive microangiopathy and is currently maintained on Keppra.  This is an abnormal EEG during wakefulness, drowsiness and sleep.  The overall degree of slowing and disorganization suggests a mild encephalopathy nonspecific cause.  The presence of generalized synchronous slowing further supports a diagnosis of a generalized encephalopathy  likely secondary to PRES.  No seizures recorded during this study

## 2020-02-02 NOTE — ASSESSMENT & PLAN NOTE
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      2/1- lethargic, EEG was done with no seizure activity, Keppra dose was decreased.   2/2- mental status has improved.    Recommendations  -- Keppra 250 mg BID  -- Unhooked from EEG, please call back if clinical event concerning for seizure occurs   -- continue correction of metabolic derangements per primary

## 2020-02-02 NOTE — PLAN OF CARE
A: Awake               RASS: Goal - RASS Goal: 0-->alert and calm  Actual - RASS ([place] Agitation-Sedation Scale): -1-->drowsy              Restraint necessity: NA  B: Breath              SBT: Not intubated   C: Coordinate A & B, analgesics/sedatives              Pain: managed               SAT: Not intubated  D: Delirium              CAM-ICU: Overall CAM-ICU: Negative  E: Early Mobility              MOVE Screen: Fail              Activity: Activity Management: activity adjusted per tolerance  FAS: Feeding/Nutrition              Diet order: Diet/Nutrition Received: tube feeding,   Fluid restriction:    T: Thrombus              DVT prophylaxis: VTE Required Core Measure: Pharmacological prophylaxis initiated/maintained  H: HOB Elevation              Head of Bed (HOB): HOB at 30 degrees  U: Ulcer Prophylaxis              GI: yes  G: Glucose control              managed Glycemic Management: blood glucose monitoring  S: Skin              Bundle compliance: yes   Bathing/Skin Care: back care Date: 02/01/2020 at 1700     B: Bowel Function              no issues   I: Indwelling Catheters              Arcos necessity: No; Pt is anuric on HD              CVC necessity: No              IPAD offered: Yes  D: De-escalation Antibx              Yes  Plan for the day              For possible step down today  Family/Goals of care/Code Status              Code Status: Full Code     No acute events throughout day, VS and assessment per flow sheet, patient progressing towards goals as tolerated, plan of care reviewed with Vaughn Retana and family, all concerns addressed, will continue to monitor.

## 2020-02-02 NOTE — PROVIDER TRANSFER
[facility] Medicine ICU Acceptance Note    Date of Admit: 1/20/2020  Date of Transfer / Stepdown: 2/2/2020  Angela, C/J, L, Onc (IV chemo w/in 1 month), Gyn/Onc, or other special case?: no   ICU team stepping patient down: MICU   ICU team member giving verbal handoff: [id number]   Accepting  team: ELINA    Brief History of Present Illness:      Patient is a 54 year old male with extensive medical history including ESRD on dialysis MWF (has not received it today), L below knee amputation 2/2 osteomyelitis, dCHF (last echo 8/2/19 [facility]), GERD, h/o multiple ICH w/o residual deficits, and multiple instances of GI bleed (last EGD 11/12/19, esophagitis) who presents to ED [facility] due to altered mental status. From NH report, nursing home staff went to wake the patient for his morning dialysis. He was confused, lethargic, had a moderate amount of brown colored emesis in his trash can. Patient last got dialysis on Friday. Patient is normally able to ambulate on his own and is alert and oriented; nursing staff reports that he appeared normal yesterday.  At the time of ICU consult, initial work up showed largely unremarkable cbc with no leukocytosis. CMP significant for a bicarb of 36; patient is ESRD with creatinine of 10.8. Patient still makes some urine, and UA was without evidence for UTI. Lactic acid acid was mildly elevated at 2.4 and troponin mildly elevated at .348->.339. INR is at 1.2. Alcohol and drug screen negative. Patient was given versed prior to undergoing CT scan; which showed no acute intraparenchymal hemorrhage or major vascular distribution, senescent changes, and remote lacunar type basal ganglia infarct. Shortly after CT, patient had a witnessed tonic clonic seizure. He received 2 mg of ativan and 1500 of keppra. Patient became non-responsive afterwards and was severely hypertensive with systolic bp in the 200's. Patient was intubated for airway protection and started on propofol and  "Cardene drip for hypertension. Family updated over the phone and at bedside.        Hospital/ICU Course:     Patient admitted to critical care medicine on 1/20 with concerns of new onset seizures and s/p intubation for airway protection. Patient was started on vanc, rocephin, ampicillin, and acyclovir to cover for meningitis. Patient had spot EEG while on propofol in ED which did not show seizure activity. MRI done showed chronic changes but no acute abnormalities. Patient was taken off cardene drip soon after he arrived in ICU. Lumbar puncture was done after MRI. CSF labs were not convincing for bacterial or viral meningitis; although cultures are pending. Continuous EEG was done after propofol was stopped which showed epileptiform discharges. Per epilepsy, patient was given another dose of Keppra, will follow up after checking levels as patient is ESRD. Nephrology consulted and started HD. Pt became febrile the night of 01/22; blood cultures repeated and restarted on Vanc and Zosyn. HSV PCR neg so Acyclovir discontinued. Neurology would like SBP < 160; initiated home Carvedilol. Pt became hypotensive with HD on 1/24 so unable to remove fluid off during dialysis. BP trending back up and stable. Neurology will monitor patient again with EEG for seizures. 01/27 Patient's neuro exam is somewhat improved from day prior per nurse - opening eyes spontaneously and occasionally tracking movements, blinking to threat. Received dialysis today. EEG to end 15:30, will f/u Neuro recs regarding continuing Keppra or discontinuing. 01/28 passed SBT today, plan to extubate 01/29. Repeat 24 hr EEG shows "multifocal slowing consistent with multiple areas of focal cortical dysfunction and occasional epileptiform discharges in the left frontotemporal region consistent with a potential seizure focus in this area" with no electrographic seizures. 01/29 Originally planned for extubation today, but will defer in light of BRBPR ~08:30 per " nurse. GI consulted, no scope indicated at this time. Will restart PPI. Otherwise mental status improving in small increments daily. Keppra decreased to 500 mg bid. 01/30 Patient successfully extubated, though very weak. Unable to clear secretions effectively at this time, weak cough. AOx2, following commands and responding to questions appropriately. Wound care consulted for 2 areas of ulceration on back of head. Wound care consult cancelled, what were thought to be ulcers actually glue from EEG leads. Next day after extubation patient became aphasic, somnolent, not following all commands, keppra decreased to 500 mg once daily.CT head showed no acute changed, undergoing EEG.    Consultants and Procedures:     Consultants:  Critical care  Neurology  Nephrology  Gastroenterology    Procedures:    EEG  CT head  MRI brain  CXR  KUB    Transfer Information:     Diet:  Tube feeds Ordered    Physical Activity:  OT/PT Ordered    To Do / Pending Studies / Follow ups:  -follow-up EEG results, Neurology recommendations.  Continue Keppra  -continue tube feeds.  Continue to follow SLP recommendations and advance diet as tolerated  -continue neuro checks, seizure precautions, fall precautions, aspiration precaution  -maintain delirium precautions  -RT to continue suction q4 hrs  -continue work with OT/PT  -monitor CBC daily.  Will need outpatient follow-up with GI    Patient has been accepted by Hospital Medicine Team G, who will assume care of the patient upon arrival to the floor from the ICU. Please contact ICU team with any concerns prior to arrival. Please contact Hospital Medicine at [phone] or [phone] (please do NOT leave a voicemail) when patient arrives to the floor.    Mariposa Dalton MD  2/2/2020 2:02 PM  Department of Hospital medicine

## 2020-02-02 NOTE — PROGRESS NOTES
[facility]  Neurology  Progress Note    Patient Name: Vaughn Retana  MRN: [id number]  Admission Date: 1/20/2020  Hospital Length of Stay: 13 days  Code Status: Full Code   Attending Provider: Mariposa Dalton MD  Primary Care Physician: Cori Randall MD   Principal Problem:Seizure      Subjective:     Interval History:   Mental status improved overnight, more awake and alert today  EEG with slowing more consistent with encephalopathy, no seizure.     Current Neurological Medications:   Keppra 250 mg BID    Current Facility-Administered Medications   Medication Dose Route Frequency Provider Last Rate Last Dose    acetaminophen tablet 650 mg  650 mg Oral Q4H PRN Mariposa Dalton MD        albuterol-ipratropium 2.5 mg-0.5 mg/3 mL nebulizer solution 3 mL  3 mL Nebulization Q4H PRN Mariposa Dalton MD        carvedilol tablet 3.125 mg  3.125 mg Per OG tube BID Ricky Morgan MD   3.125 mg at 02/02/20 0818    dextrose 10% (D10W) Bolus  12.5 g Intravenous PRN Mariposa Dalton MD        dextrose 10% (D10W) Bolus  25 g Intravenous PRN Mariposa Dalton MD        glucagon (human recombinant) injection 1 mg  1 mg Intramuscular PRN Mariposa Dalton MD        glucose chewable tablet 16 g  16 g Oral PRN Mariposa Dalton MD        glucose chewable tablet 24 g  24 g Oral PRN Mariposa Dalton MD        heparin (porcine) injection 5,000 Units  5,000 Units Subcutaneous Q8H Bharat Ramirez NP   5,000 Units at 02/02/20 0530    levetiracetam oral soln Soln 500 mg  500 mg Per NG tube QHS Bharat Ramirez NP   500 mg at 02/01/20 2104    midodrine tablet 5 mg  5 mg Per NG tube Every Mon, Wed, Fri Yimi Taylor MD   5 mg at 01/31/20 0822    ondansetron injection 4 mg  4 mg Intravenous Q8H PRN Mariposa Dalton MD        pantoprazole injection 40 mg  40 mg Intravenous Q12H Everardo Chen MD   40 mg at 02/02/20 0818    polyethylene glycol packet 17 g  17 g Oral BID PRN Mariposa Dalton MD         prochlorperazine injection Soln 5 mg  5 mg Intravenous Q6H PRN Mariposa Dalton MD        senna-docusate 8.6-50 mg per tablet 1 tablet  1 tablet Oral Daily PRN Mariposa Dalton MD        sevelamer carbonate pwpk 1.6 g  1.6 g Per OG tube TID Bryan Merrill MD   1.6 g at 02/02/20 0818    sodium chloride 0.9% flush 10 mL  10 mL Intravenous PRN Mariposa Dalton MD         Review of Systems   Constitutional: Negative for fatigue.   Eyes: Negative for visual disturbance.   Respiratory: Negative for cough.    Musculoskeletal: Negative for neck pain.   Neurological: Negative for headaches.     Objective:     Vital Signs (Most Recent):  Temp: 96.8 °F (36 °C) (02/02/20 1159)  Pulse: 83 (02/02/20 1159)  Resp: 18 (02/02/20 1159)  BP: 131/63 (02/02/20 1159)  SpO2: 98 % (02/02/20 1159) Vital Signs (24h Range):  Temp:  [96.8 °F (36 °C)-98.6 °F (37 °C)] 96.8 °F (36 °C)  Pulse:  [77-89] 83  Resp:  [11-24] 18  SpO2:  [95 %-100 %] 98 %  BP: (124-173)/(55-76) 131/63     Weight: 57 kg (125 lb 10.6 oz)  Body mass index is 20.28 kg/m².    Physical Exam   Constitutional: No distress.   Pulmonary/Chest:   Intubated      Skin: He is not diaphoretic.   Psychiatric: His speech is normal.       NEUROLOGICAL EXAMINATION:     MENTAL STATUS   Speech: speech is normal   Level of consciousness: alert ,  drowsy    CRANIAL NERVES     CN III, IV, VI   Nystagmus: none   Ophthalmoparesis: none    CN VIII   Hearing: intact       Tracks room  Attends to voice   Face symmetric at rest      MOTOR EXAM   Muscle bulk: normal  Overall muscle tone: normal       Squeezes hand and wiggles toes      GAIT AND COORDINATION     Tremor   Resting tremor: absent    Significant Labs:   CBC:   Recent Labs   Lab 02/01/20  0227 02/02/20  0320   WBC 10.44 8.96   HGB 11.1* 10.5*   HCT 36.2* 34.2*    298     CMP:   Recent Labs   Lab 02/01/20  0227 02/02/20  0320    148*    139   K 4.8 4.0    104   CO2 18* 18*   BUN 37* 57*    CREATININE 5.1* 7.1*   CALCIUM 9.8 9.6   MG 2.3 2.4   PROT 8.9* 8.6*   ALBUMIN 2.6* 2.5*   BILITOT 0.4 0.3   ALKPHOS 222* 216*   AST 34 22   ALT 26 20   ANIONGAP 17* 17*   EGFRNONAA 11.8* 7.9*     Inflammatory Markers: No results for input(s): SEDRATE, CRP, PROCAL in the last 48 hours.  Respiratory Culture: No results for input(s): GSRESP, RESPIRATORYC in the last 48 hours.  Urine Culture: No results for input(s): LABURIN in the last 48 hours.  Urine Studies: No results for input(s): COLORU, APPEARANCEUA, PHUR, SPECGRAV, PROTEINUA, GLUCUA, KETONESU, BILIRUBINUA, OCCULTUA, NITRITE, UROBILINOGEN, LEUKOCYTESUR, RBCUA, WBCUA, BACTERIA, SQUAMEPITHEL, HYALINECASTS in the last 48 hours.    Invalid input(s): WRIGHTSUR  All pertinent lab results from the past 24 hours have been reviewed.    vEEG (1/27-1/28/20): 23 hr 54 min  This is an abnormal continuous EEG monitoring study because of multifocal slowing with occasional epileptiform discharges in the left frontotemporal region consistent with multiple areas of focal cortical dysfunction and a potential seizure focus.  There are bursts of frontally predominant generalized slowing consistent with subcortical or deep midline dysfunction.  There are no electrographic seizures during this recording session.  Compared to the previous day's recording, focal slowing with epileptiform potential is more apparent on this recording session.    Significant Imaging: I have reviewed and interpreted all pertinent imaging results/findings within the past 24 hours.     MRI Brain WO contrast (1/20/20):  1. No acute intracranial abnormality identified.  2. Remote microhemorrhages throughout the brain in a predominantly central distribution suggesting hypertensive microangiopathy.  3. Chronic microvascular ischemic change.    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      2/1- lethargic, EEG was done with no seizure activity, Keppra dose was decreased.   2/2- mental status has improved.    Recommendations  -- Keppra 250 mg BID  -- Unhooked from EEG, Neurology will sign off. Please call back if clinical event concerning for seizure occurs.   -- continue correction of metabolic derangements per primary                  VTE Risk Mitigation (From admission, onward)         Ordered     heparin (porcine) injection 5,000 Units  Every 8 hours      01/30/20 1109     Place sequential compression device  Until discontinued      01/20/20 1503     IP VTE HIGH RISK PATIENT  Once      01/20/20 1503                Le Chen MD  Neurology  [facility]

## 2020-02-02 NOTE — RESIDENT HANDOFF
Handoff     Primary Team: [facility] Room Number: 6092/6092 A     Patient Name: Vaughn Retana MRN: [id number]     Date of Birth: [id number] Allergies: Fosrenol [lanthanum]     Age: 54 y.o. Admit Date: 1/20/2020     Sex: male  BMI: Body mass index is 20.28 kg/m².     Code Status: Full Code        Illness Level (current clinical status): Watcher - NO    Reason for Admission: Seizure    Brief HPI: Patient is a 54 year old male with extensive medical history including ESRD on dialysis MWF (has not received it today), L below knee amputation 2/2 osteomyelitis, dCHF (last echo 8/2/19 [facility]), GERD, h/o multiple ICH w/o residual deficits, and multiple instances of GI bleed (last EGD 11/12/19, esophagitis) who presents to ED with encephalopathy, developed seizure and intubated for airway protection and started on AED      Hospital Course :  Patient admitted to critical care medicine on 1/20 with concerns of new onset seizures and s/p intubation for airway protection. Patient was started on vanc, rocephin, ampicillin, and acyclovir to cover for meningitis. Patient had spot EEG while on propofol in ED which did not show seizure activity. MRI done showed chronic changes but no acute abnormalities. Patient was taken off cardene drip soon after he arrived in ICU. Lumbar puncture was done after MRI. CSF labs were not convincing for bacterial or viral meningitis; although cultures are pending. Continuous EEG was done after propofol was stopped which showed epileptiform discharges. Per epilepsy, patient was given another dose of Keppra, will follow up after checking levels as patient is ESRD. Nephrology consulted and started HD. Pt became febrile the night of 01/22; blood cultures repeated and restarted on Vanc and Zosyn. HSV PCR neg so Acyclovir discontinued. Neurology would like SBP < 160; initiated home Carvedilol. Pt became hypotensive with HD on 1/24 so unable to remove fluid off during dialysis. BP trending back up  "and stable. Neurology will monitor patient again with EEG for seizures. 01/27 Patient's neuro exam is somewhat improved from day prior per nurse - opening eyes spontaneously and occasionally tracking movements, blinking to threat. Received dialysis today. EEG to end 15:30, will f/u Neuro recs regarding continuing Keppra or discontinuing. 01/28 passed SBT today, plan to extubate 01/29. Repeat 24 hr EEG shows "multifocal slowing consistent with multiple areas of focal cortical dysfunction and occasional epileptiform discharges in the left frontotemporal region consistent with a potential seizure focus in this area" with no electrographic seizures. 01/29 Originally planned for extubation today, but will defer in light of BRBPR ~08:30 per nurse. GI consulted, no scope indicated at this time. Will restart PPI. Otherwise mental status improving in small increments daily. Keppra decreased to 500 mg bid. 01/30 Patient successfully extubated, though very weak. Unable to clear secretions effectively at this time, weak cough. AOx2, following commands and responding to questions appropriately. Wound care consulted for 2 areas of ulceration on back of head. Next day after extubation patient became aphasic, somnolent, not following all commands, keppra decreased to 500 mg once daily.Repeating head CT, contacted neurology they recommended repeating EEG.    Tasks & Contingency Plan :  - Follow up repeated EEG   - Encourage working with PT/OT and speech   - Follow up with GI out-patient       Discharge Disposition: TBD    Mentored By: ICU TEAM-2   "

## 2020-02-02 NOTE — PROGRESS NOTES
[facility]  Critical Care Medicine  Progress Note    Patient Name: Vaughn Retana  MRN: [id number]  Admission Date: 1/20/2020  Hospital Length of Stay: 13 days  Code Status: Full Code  Attending Provider: Bertha Handley MD  Primary Care Provider: Cori Randall MD   Principal Problem: Seizure    Subjective:     HPI:  Patient is a 54 year old male with extensive medical history including ESRD on dialysis MWF (has not received it today), L below knee amputation 2/2 osteomyelitis, dCHF (last echo 8/2/19 [facility]), GERD, h/o multiple ICH w/o residual deficits, and multiple instances of GI bleed (last EGD 11/12/19, esophagitis) who presents to ED [facility] [place] due to altered mental status. From NH report, nursing home staff went to wake the patient for his morning dialysis. He was confused, lethargic, had a moderate amount of brown colored emesis in his trash can. Patient last got dialysis on Friday. Patient is normally able to ambulate on his own and is alert and oriented; nursing staff reports that he appeared normal yesterday.       At the time of ICU consult, initial work up showed largely unremarkable cbc with no leukocytosis. CMP significant for a bicarb of 36; patient is ESRD with creatinine of 10.8. Patient still makes some urine, and UA was without evidence for UTI. Lactic acid acid was mildly elevated at 2.4 and troponin mildly elevated at .348->.339. INR is at 1.2. Alcohol and drug screen negative. Patient was given versed prior to undergoing CT scan; which showed no acute intraparenchymal hemorrhage or major vascular distribution, senescent changes, and remote lacunar type basal ganglia infarct. Shortly after CT, patient had a witnessed tonic clonic seizure. He received 2 mg of ativan and 1500 of keppra. Patient became non-responsive afterwards and was severely hypertensive with systolic bp in the 200's. Patient was intubated for airway protection and started on propofol and  "Cardene drip for hypertension. Family updated over the phone and at bedside.                Hospital/ICU Course:  Patient admitted to critical care medicine on 1/20 with concerns of new onset seizures and s/p intubation for airway protection. Patient was started on vanc, rocephin, ampicillin, and acyclovir to cover for meningitis. Patient had spot EEG while on propofol in ED which did not show seizure activity. MRI done showed chronic changes but no acute abnormalities. Patient was taken off cardene drip soon after he arrived in ICU. Lumbar puncture was done after MRI. CSF labs were not convincing for bacterial or viral meningitis; although cultures are pending. Continuous EEG was done after propofol was stopped which showed epileptiform discharges. Per epilepsy, patient was given another dose of Keppra, will follow up after checking levels as patient is ESRD. Nephrology consulted and started HD. Pt became febrile the night of 01/22; blood cultures repeated and restarted on Vanc and Zosyn. HSV PCR neg so Acyclovir discontinued. Neurology would like SBP < 160; initiated home Carvedilol. Pt became hypotensive with HD on 1/24 so unable to remove fluid off during dialysis. BP trending back up and stable. Neurology will monitor patient again with EEG for seizures. 01/27 Patient's neuro exam is somewhat improved from day prior per nurse - opening eyes spontaneously and occasionally tracking movements, blinking to threat. Received dialysis today. EEG to end 15:30, will f/u Neuro recs regarding continuing Keppra or discontinuing. 01/28 passed SBT today, plan to extubate 01/29. Repeat 24 hr EEG shows "multifocal slowing consistent with multiple areas of focal cortical dysfunction and occasional epileptiform discharges in the left frontotemporal region consistent with a potential seizure focus in this area" with no electrographic seizures. 01/29 Originally planned for extubation today, but will defer in light of BRBPR ~08:30 " per nurse. GI consulted, no scope indicated at this time. Will restart PPI. Otherwise mental status improving in small increments daily. Keppra decreased to 500 mg bid. 01/30 Patient successfully extubated, though very weak. Unable to clear secretions effectively at this time, weak cough. AOx2, following commands and responding to questions appropriately. Wound care consulted for 2 areas of ulceration on back of head. Wound care consult cancelled, what were thought to be ulcers actually glue from EEG leads. Next day after extubation patient became aphasic, somnolent, not following all commands, keppra decreased to 500 mg once daily.CT head showed no acute changed, undergoing EEG.    Interval History/Significant Events: no cute events overnight, CT head showed no acute events     Review of Systems   Unable to perform ROS: Acuity of condition     Objective:     Vital Signs (Most Recent):  Temp: 98.5 °F (36.9 °C) (02/02/20 0701)  Pulse: 80 (02/02/20 0800)  Resp: 15 (02/02/20 0800)  BP: (!) 153/64 (02/02/20 0800)  SpO2: 100 % (02/02/20 0800) Vital Signs (24h Range):  Temp:  [97.9 °F (36.6 °C)-98.6 °F (37 °C)] 98.5 °F (36.9 °C)  Pulse:  [76-89] 80  Resp:  [11-29] 15  SpO2:  [95 %-100 %] 100 %  BP: ()/(46-76) 153/64   Weight: 57 kg (125 lb 10.6 oz)  Body mass index is 20.28 kg/m².      Intake/Output Summary (Last 24 hours) at 2/2/2020 0857  Last data filed at 2/2/2020 0800  Gross per 24 hour   Intake 770 ml   Output 70 ml   Net 700 ml       Physical Exam   Constitutional: No distress.   SOMNOLENT    HENT:   Head: Normocephalic and atraumatic.   Mouth/Throat: No oropharyngeal exudate.   Eyes: Pupils are equal, round, and reactive to light.   Neck: No JVD present.   Cardiovascular: Normal rate, regular rhythm and normal heart sounds.   No murmur heard.  Pulmonary/Chest: Effort normal. No respiratory distress. He has no wheezes. He has rales (minimal inspiratory rales in bases).   Breathing comfortably, though creating  copious secretions with occasional gurgling before suction   Abdominal: Soft. He exhibits no distension. There is no tenderness.   Musculoskeletal:   Left below knee amputation   Neurological: He is alert.   PERRL. Alert. Following some commands. Generalized weakness, notably weak cough. Moving all limbs on command. Patient was able to say few words    Skin: Skin is warm and dry. Capillary refill takes less than 2 seconds. He is not diaphoretic.   Vitals reviewed.      Vents:  Vent Mode: Spont (01/30/20 1205)  Ventilator Initiated: Yes (01/20/20 1327)  Set Rate: 12 BPM (01/30/20 0739)  Vt Set: 340 mL (01/24/20 1403)  Pressure Support: 7 cmH20 (01/30/20 1205)  PEEP/CPAP: 5 cmH20 (01/30/20 1205)  Oxygen Concentration (%): 21 (01/30/20 1205)  Peak Airway Pressure: 12 cmH2O (01/30/20 1205)  Plateau Pressure: 17 cmH20 (01/30/20 1205)  Total Ve: 5.67 mL (01/30/20 1205)  F/VT Ratio<105 (RSBI): (!) 33.33 (01/30/20 1205)  Lines/Drains/Airways     Drain                 Hemodialysis AV Fistula Right upper arm -- days         Hemodialysis AV Fistula Right upper arm -- days         NG/OG Tube 01/20/20 1932 [place] sump Right nostril 12 days          Peripheral Intravenous Line                 Peripheral IV - Single Lumen 01/28/20 0905 18 G;1 3/4 in Left Upper Arm 4 days         Peripheral IV - Single Lumen 01/30/20 1910 22 G Anterior;Distal;Left Wrist 2 days         Peripheral IV - Single Lumen 02/02/20 0300 22 G Anterior;Left Forearm less than 1 day              Significant Labs:    CBC/Anemia Profile:  Recent Labs   Lab 02/01/20 0227 02/02/20  0320   WBC 10.44 8.96   HGB 11.1* 10.5*   HCT 36.2* 34.2*    298   MCV 92 92   RDW 16.4* 16.1*        Chemistries:  Recent Labs   Lab 02/01/20 0227 02/02/20  0320    139   K 4.8 4.0    104   CO2 18* 18*   BUN 37* 57*   CREATININE 5.1* 7.1*   CALCIUM 9.8 9.6   ALBUMIN 2.6* 2.5*   PROT 8.9* 8.6*   BILITOT 0.4 0.3   ALKPHOS 222* 216*   ALT 26 20   AST 34 22   MG 2.3 2.4    PHOS 4.5 4.7*           Significant Imaging:  I have reviewed all pertinent imaging results/findings within the past 24 hours.      ABG  Recent Labs   Lab 02/01/20  1422   PH 7.382   PO2 27*   PCO2 43.4   HCO3 25.8   BE 1     Assessment/Plan:     Neuro  * Seizure  Altered mental status     Patient initially presented to ED prior to getting dialysis due to AMS and brown emesis. Had witnessed tonic clonic seizure requiring ativan shortly after getting a CT head. He was loaded with 1500 mg Keppra, intubated for airway protection, and started on propofol for sedation. Upon physical exam, patient remained unresponsive to verbal or tactile stimuli and had upward left sided gaze with sluggish pupils. Concern for status epilepticus. Differential diagnosis includes polysubstance, sepsis, intracranial abnormalities, and PRES. 01/29 mental status continues to slowly improve daily.    - Spot EEG without evidence of current seizure. However patient already received keppra, ativan, and sedated on propofol. 24 hour EEG with L sided structural lesion and underlying epileptiform potential. Initiated 1500 mg keppra on 01/22, per Neurology recs.  - Substance induced: UDS negative, alcohol level wnl  - Sepsis: Initial lactate 2.4, likely due to seizure event; repeat was 1.9. Patient received 500 ml saline in ED. Initial blood cultures, sputum culture+gram stain neg  Lumbar puncture done, CSF not convincing for meningitis, so ampicillin, vancomycin, rocephin d/c'd on 01/22. However, pt was febrile on 11/23 to 101.3. Blood cultures repeated and restarted on Vanc and Zosyn. Repeat cultures NGTD. HSV PCR neg; Acyclovir discontinued. Prt has remained afebrile for > 72 hours. Vanc and Zosyn discontinued 01/26.   - Intracranial: patient with history of ICH with no functional deficits. Possibly multiple foci for seizure overtime. CT without acute changes, MRI brain with chronic changes and hypertensive angiopathy; no acute changes.  - PRES:  Patient off cardene drip. MRI reviewed. Will follow blood pressure as patient is normally hypotensive.   - Neurology would like to repeat EEG to ensure that pt is not having any seizures. Finishing 15:30 on 01/27. Diffuse slowing, though no focal activity seen on EEG. 01/28 patient's mental status is continuing to improve day to day, now following commands. Passed SBT 01/28. Plan for extubation 01/29 deferred in light of suspected LGIB. Keppra brought back to 500 mg bid. 01/30 Patient is alert and following commands. Passed SBT and extubated ~12:00 pm. Breathing well though with generalized weakness as well as weak cough. PT/OT/SLP ordered    Mental status improving, patient was able to say few words 2/2/2020  - decreased keppra 500 daily due to weakness and somnolence   - head CT repeat showed no acute changes   - Neurology re contacted in concern for seizure and stroke, recommended repeat EEG and they will follow along   - follow up with neurology     Aphasia  CT head showed no acute changes  Patent follow commands but aphasic  Diffuse muscle weakness s.p extubation   Could be due to debility vs keppra vs patient baseline   Failed swallow eval   CK normal   Per nursing home patient was talkative and following commands prior to his admission   Off note patient has history of aphasia     - NPO and tube feed  - decreasing keppra to 500 daily   - repeat CT Head   - Neurology reconsulted in concern for seizure and stroke.     Cardiac/Vascular  Renovascular hypertension  Patient on coreg at nursing home; has been compliant as given by nursing home. Patient with hypertensive emergency on admit and started Cardene drip to titrate to BP of 160-180. Off Cardene drip since 01/20. Goal SBP < 160 per Neurology. Pt started on home Carvedilol on 01/24; BP well controlled.     -- continue home carvedilol 3.125 mg BID    Chronic diastolic heart failure  Last echo done at [facility] 8/2/19, EF>55%, bi-atrial enlargement  No signes of  exacerbation       Endocrine  Severe malnutrition  Nutrition consulted  Novasource @ 30 mL/hr to provide 1440 kcals, 65 g of protein, 516 mL fluid.     - Continue TFs    GI  Gastrointestinal hemorrhage with hematemesis  GERD with esophagitis    Patient with reports of coffee ground emesis prior to transport to ED. In ED, dark brown contents suctioned from stomach. Hemoglobin remains stable at 15 and patient is hypertensive. Patient is well known to GI. His most recent scope was in November that just showed esophagitis similar to his previous EGD. GI consulted and appreciate recommendations. Currently on 40 mg BID. 01/29 - patient had BRBPR ~08:30 per nurse. GI consulted - no scope indicated at this time. Will switch to PPI from famotidine.    - H/H stable, will space CBC to daily  - PPI restarted 01/29    GERD with esophagitis  Continue Protonix  Follow up with GI out patient                Critical Care Daily Checklist:     A: Awake: RASS Goal/Actual Goal: RASS Goal: 0-->alert and calm  Actual: [place] Agitation Sedation Scale (RASS): Alert and calm   B: Spontaneous Breathing Trial Performed? Spon. Breathing Trial Initiated?: Initiated (01/30/20 1205)   C: SAT & SBT Coordinated?  yes                      D: Delirium: CAM-ICU Overall CAM-ICU: Negative   E: Early Mobility Performed? Yes   F: Feeding Goal: Goals: Meet % EEN, EPN  Status: Nutrition Goal Status: goal met   Current Diet Order   No orders of the defined types were placed in this encounter.       AS: Analgesia/Sedation n/a   T: Thromboembolic Prophylaxis Heparin   H: HOB > 300 Yes   U: Stress Ulcer Prophylaxis (if needed) PPI   G: Glucose Control managing   B: Bowel Function Stool Occurrence: 1   I: Indwelling Catheter (Lines & Arcos) Necessity PIV x2   NGT      D: De-escalation of Antimicrobials/Pharmacotherapies       Plan for the day/ETD    Continue to monitor neurologic status for improvement. Repeat head CT      Code Status:  Family/Goals of  Care: Full Code               Critical secondary to seizure        Critical care was time spent personally by me on the following activities: development of treatment plan with patient or surrogate and bedside caregivers, discussions with consultants, evaluation of patient's response to treatment, examination of patient, ordering and performing treatments and interventions, ordering and review of laboratory studies, ordering and review of radiographic studies, pulse oximetry, re-evaluation of patient's condition. This critical care time did not overlap with that of any other provider or involve time for any procedures.     ROLDAN Mabry  Critical Care Medicine  [facility]

## 2020-02-02 NOTE — NURSING TRANSFER
Nursing Transfer Note      2/2/2020     Transfer to 955 from 6092    Transfer via bed    Transfer with confirmed TELE monitoring, EEG in stand-by, chart    Transported by this RN and SN Gavi    Medicines sent: N/A    Chart send with patient: Yes    Notified: N/A    Patient reassessed at: 2/2/20 @ 1140    Upon arrival to floor: chart given to , nursing at bedside.

## 2020-02-03 NOTE — PT/OT/SLP PROGRESS
"Speech Language Pathology Treatment    Patient Name:  Vaughn Retana   MRN:  [id number]   955/955 A    Admitting Diagnosis: Seizure    Recommendations:                 General Recommendations:  Dysphagia therapy  Diet recommendations:  NPO, Liquid Diet Level: NPO   Aspiration Precautions: Strict aspiration precautions   General Precautions: Standard, aspiration, NPO, fall  Communication strategies:  go to room if call light pushed    Subjective     "Ahhh." Pt vocalized on command following PO trial for observation of vocal quality.     Pain/Comfort:  · Pain Rating 1: 0/10  · Pain Rating Post-Intervention 1: 0/10    Objective:     Has the patient been evaluated by SLP for swallowing?   Yes  Keep patient NPO? Yes   Current Respiratory Status: room air      Pt awake upon entry. Repositioned and HOB raised. Engagement in spontaneous conversation with clinician unappreciated throughout session. However pt answered y/n q's via head shake/ nod response. Pt provided PO trials thin water via tsp x2 and pureed apple sauce via 1/2 tsp x1. Oral phase appeared WFL. Coughing appreciated immediately following 2/2 thin water boluses. Wet vocal quality followed by delayed cough noted following 1/1 pureed bolus. Education provided re: ongoing SLP recommendation for NPO and ongoing SLP POC. Pt indicated understanding of education provided and agreement with SLP POC. No further questions.     Assessment:     Vaughn Retana is a 54 y.o. male with an SLP diagnosis of dysphagia.     Goals:   Multidisciplinary Problems     SLP Goals        Problem: SLP Goal    Goal Priority Disciplines Outcome   SLP Goal     SLP Ongoing, Progressing   Description:  Speech Language Pathology  Goals expected to be met by 2/7:  1. Pt will participate in ongoing assessment of swallow to determine safest, least restrictive diet.                   Plan:     · Patient to be seen:  4 x/week   · Plan of Care expires:  02/29/20  · Plan of Care reviewed with:  " patient   · SLP Follow-Up:  Yes       Discharge recommendations:  other (see comments)(Pending PT/ OT)     Time Tracking:     SLP Treatment Date:   02/03/20  Speech Start Time:  0858  Speech Stop Time:  0910     Speech Total Time (min):  12 min    Billable Minutes: Treatment Swallowing Dysfunction 12    NALDO Holden, CCC-SLP  [phone]  2/3/2020

## 2020-02-03 NOTE — PROGRESS NOTES
[facility] H[facility]ODIAL[facility] NOTE    Vaughn Retana is a 54 y.o. male currently on hemodialysis for removal of uremic toxins and volume.     Patient seen and evaluated on hemodialysis, tolerating treatment, see HD flowsheet for vitals and assessments.    No Hypotension, chest pain, shortness of breath, cramping, nausea or vomiting.      Labs have been reviewed and the dialysate bath has been adjusted.    Labs:      Recent Labs   Lab 01/31/20  0500 02/01/20 0227 02/02/20  0320    139 139   K 4.2 4.8 4.0    104 104   CO2 19* 18* 18*   BUN 72* 37* 57*   CREATININE 8.4* 5.1* 7.1*   CALCIUM 9.9 9.8 9.6   PHOS 6.0* 4.5 4.7*       Recent Labs   Lab 01/31/20  0303 02/01/20 0227 02/02/20  0320   WBC 7.28 10.44 8.96   HGB 11.2* 11.1* 10.5*   HCT 36.6* 36.2* 34.2*    272 298        Assessment/Plan    Ultrafiltration goal:  2 L as tolerated, keep MAP >65.  - Seen on dialysis this afternoon, tolerating session with current UFR, no complications.    - Patient stepped down over the weekend, no acute events overnight, patient still with delayed responses.   - No lab stick/BP intake on access site  - Will continue dialysis treatments while in-patient  - Continue to monitor intake and output, daily weights   - Avoid nephrotoxic medication and renal dose medications to GFR    Anemia of ESRD  - Hgb within normal range     BMM  - Renal diet with protein intake goal 1.5 g/kg/d  - Novasource with meals  - Phos 4.7, on binders   - Daily renal function panel     Glenis Benavidez, AIDLIA, APRN, FNP-C  Nephrology Department  Pager:  [phone]    '

## 2020-02-03 NOTE — PROGRESS NOTES
Received pt to [facility] via bed, nonverbal, EEG monitor in place.  Dialysis started via right arm fistula with 15 gauge needles.  Lines secured and taped. Pt tolerated without difficulty.  Suction set up at bedside per protocol.

## 2020-02-03 NOTE — NURSING
Patient pulled out NG Tube; patient's lungs also sound coarse and patient is gargling and coughing on secretions. MD on call notified. New NG tube placed. Orders placed for abd. X-ray for verification of NGT placement as well as CXR to r/o aspiration. WCTM.

## 2020-02-03 NOTE — PLAN OF CARE
Recommend ongoing NPO. Team may wish to consider resuming alternate means nutrition, hydration, medication.     NALDO Holden, CCC-SLP  [phone]  2/3/2020

## 2020-02-03 NOTE — PROGRESS NOTES
Hospital Medicine   Progress note      Team: [facility] HOSP MED G Mariposa Dalton MD   Admit Date: 1/20/2020   Hospital Day: 14  JUAN: 2/5/2020   Code status: Full Code   Principal Problem: Seizure     Summary: Patient is a 54 year old male with extensive medical history including ESRD on dialysis MWF (has not received it today), L below knee amputation 2/2 osteomyelitis, dCHF (last echo 8/2/19 [facility]), GERD, h/o multiple ICH w/o residual deficits, and multiple instances of GI bleed (last EGD 11/12/19, esophagitis) who presents to ED [facility] nursing home due to altered mental status. From NH report, nursing home staff went to wake the patient for his morning dialysis. He was confused, lethargic, had a moderate amount of brown colored emesis in his trash can. Patient last got dialysis on Friday. Patient is normally able to ambulate on his own and is alert and oriented; nursing staff reports that he appeared normal day before admission. Patient admitted to critical care with concerns of new onset seizures and s/p intubation for airway protection. Patient was started on vanc, rocephin, ampicillin, and acyclovir to cover for meningitis. Patient had spot EEG while on propofol in ED which did not show seizure activity. MRI done showed chronic changes but no acute abnormalities. Lumbar puncture was done after MRI. CSF labs were not convincing for bacterial or viral meningitis and antibiotics were weaned down. Continuous EEG was done after propofol was stopped which showed epileptiform discharges. Per epilepsy, patient was given another dose of Keppra. Nephrology consulted and started HD. Pt became febrile the night of 01/22; blood cultures repeated and restarted on Vanc and Zosyn. HSV PCR neg so Acyclovir discontinued.  Neurology was consulted and pt was continued on Keppra but dose was decreased. 01/27 Patient's neuro exam is somewhat improved from day prior per nurse - opening eyes spontaneously and occasionally tracking  "movements, blinking to threat. 01/28 passed SBT today, plan to extubate 01/29. Repeat 24 hr EEG shows "multifocal slowing consistent with multiple areas of focal cortical dysfunction and occasional epileptiform discharges in the left frontotemporal region consistent with a potential seizure focus in this area" with no electrographic seizures. 01/29 Originally planned for extubation, but was deferred in light of BRBPR ~08:30 per nurse. GI consulted, no scope indicated at this time. Otherwise mental status improving in small increments daily. 01/30 Patient successfully extubated, though very weak. Unable to clear secretions effectively at this time, weak cough. 2/1- lethargic, EEG was done with no seizure activity, Keppra dose was decreased.  2/2 - pt was more awake after decreasing Keppra dose further to 250mg BID. Pt not able to answer simple questions appropriate and follow simple commands. SLP following but pt continues to fail swallow study. NGT placed. Pt started on tube feeds.     Interval hx:   Pt was seen and examined at bedside. Pt had no acute events overnight, and no new complaints this morning. Pt remained hemodynamically stable and afebrile.  Patient was drowsy but easily arousable.  He was oriented x3.  Patient was able to answer simple questions and follow simple commands appropriately.  He denies any nausea, vomiting, diarrhea, constipation, blood in stools or trouble urinating. Pt denies any fevers, chills, malaise, headaches, chest pain, SOB, cough.  Patient was seen by SLP this morning in failed his bedside swallow evaluation again.  NG tube ordered to be reinserted and patient start tube feeds.    ROS (Positive in Bold, otherwise negative)  Constitutional: fever, chills, night sweats  CV: chest pain, edema, palpitations  Resp: SOB, cough, sputum production  GI: changes in appetite, NVDC, pain, melena, hematochezia, GERD, hematemesis  : Dysuria, hematuria, urinary urgency, frequency  MSK: " arthralgia/myalgia, joint swelling  Neuro/Psych: anxiety, depression    PEx   Temp:  [97.4 °F (36.3 °C)-98.8 °F (37.1 °C)]   Pulse:  [78-89]   Resp:  [16-19]   BP: (135-153)/(62-74)   SpO2:  [94 %-99 %]      I & O (Last 24H):   No intake or output data in the 24 hours ending 02/03/20 1225    General:  male  in no acute distress. Nontoxic. Resting in bed. Cooperative.  Drowsy but easily arousable.  HEENT: NCAT. PERRL. EOMI. Sclera Anicteric.  CVS: RRR. Normal S1 S2. No murmurs  Pulm: CTAB. Normal respiratory effort. No wheezes, rhonchi, or crackles.  Abdomen: Soft. Non-distended. No tenderness to palpation. No rebound or guarding. +BS.  Extremities: No edema. No cyanosis. Full ROM.  Globally weak  Neuro: Alert, oriented x 4, Spont mvt of all extremities with no focal deficits noted. Globally weak.  Able to answer questions appropriately and follow simple commands.    Recent Results (from the past 24 hour(s))   POCT glucose    Collection Time: 02/02/20  3:13 PM   Result Value Ref Range    POCT Glucose 104 70 - 110 mg/dL   POCT glucose    Collection Time: 02/03/20  8:12 AM   Result Value Ref Range    POCT Glucose 109 70 - 110 mg/dL       Recent Labs   Lab 02/01/20  1430 02/01/20  1855 02/01/20  2341 02/02/20  0523 02/02/20  1513 02/03/20  0812   POCTGLUCOSE 122* 79 145* 192* 104 109       Hemoglobin A1C   Date Value Ref Range Status   11/09/2019 4.0 4.0 - 5.6 % Final     Comment:     ADA Screening Guidelines:  5.7-6.4%  Consistent with prediabetes  >or=6.5%  Consistent with diabetes  High levels of fetal hemoglobin interfere with the HbA1C  assay. Heterozygous hemoglobin variants (HbS, HgC, etc)do  not significantly interfere with this assay.   However, presence of multiple variants may affect accuracy.     06/22/2019 4.7 4.0 - 5.6 % Final     Comment:     ADA Screening Guidelines:  5.7-6.4%  Consistent with prediabetes  >or=6.5%  Consistent with diabetes  High levels of fetal hemoglobin interfere with  the HbA1C  assay. Heterozygous hemoglobin variants (HbS, HgC, etc)do  not significantly interfere with this assay.   However, presence of multiple variants may affect accuracy.     06/22/2019 4.7 4.0 - 5.6 % Final     Comment:     ADA Screening Guidelines:  5.7-6.4%  Consistent with prediabetes  >or=6.5%  Consistent with diabetes  High levels of fetal hemoglobin interfere with the HbA1C  assay. Heterozygous hemoglobin variants (HbS, HgC, etc)do  not significantly interfere with this assay.   However, presence of multiple variants may affect accuracy.          Active Hospital Problems    Diagnosis  POA    *Seizure [R56.9]  Yes    ESRD on dialysis [N18.6, Z99.2]  Not Applicable    Aphasia [R47.01]  Clinically Undetermined    Pressure injury due to medical device [T85.898A, L89.90]  Yes    Altered mental status [R41.82]  Yes    Gastrointestinal hemorrhage with hematemesis [K92.0]  Yes    GERD with esophagitis [K21.0]  Yes    Severe malnutrition [E43]  Yes     Assessment and Plan  Severe Malnutrition in the context of Social/Environmental Circumstances     Related to (etiology):  Unknown etiology     Signs and Symptoms (as evidenced by):  Energy Intake: per pt, <75% intake over the last year  Body Fat Depletion: overall subcutaneous fat loss, moderate triceps fat loss and protruding patellas.  Muscle Mass Depletion:  moderate muscle wasting of interosseous, temporal, clavicle, calves and quadriceps  Weight Loss: 24% (39 lbs) x 1 year    Recommendations     Recommendation/Intervention: 1. Should pt be cleared for po diet, recommend renal diet with texture per SLP along with Novasource ONS TID. 2. Should pt require enteral feeding, initiate Novasource renal formula at 10ml/hr and advance 10ml Q4hrs to goal rate of 40ml/hr (provides 1920kcal, 87g pro, 691ml free water). Provide additional 500ml water flush/24 hrs. Hold for residuals >500ml.  Goals: 1. Pt to receive nutrition < 48 hrs.      Renovascular  hypertension [I15.0]  Yes     Chronic    Chronic diastolic heart failure [I50.32]  Yes     Chronic     2-23-17    1 - Low normal to mildly depressed left ventricular systolic function (EF 50-55%).     2 - Right ventricular enlargement with mildly depressed systolic function.     3 - Left ventricular diastolic dysfunction.     4 - Right atrial enlargement.     5 - Severe tricuspid regurgitation.     6 - Pulmonary hypertension. The estimated PA systolic pressure is 86 mmHg.     7 - Increased central venous pressure.       Encephalopathy [G93.40]  Yes      Resolved Hospital Problems    Diagnosis Date Resolved POA    Acute metabolic encephalopathy [G93.41] 01/23/2020 Yes    ESRD on hemodialysis [N18.6, Z99.2] 01/31/2020 Not Applicable     Chronic     MWF at [facility]            Assessment and Plan for problems addressed today:      carvediloL  3.125 mg Per OG tube BID    heparin (porcine)  5,000 Units Subcutaneous Q8H    levetiracetam IVPB  250 mg Intravenous Once    levetiracetam oral soln  250 mg Per NG tube BID    midodrine  5 mg Per NG tube Every Mon, Wed, Fri    pantoprozole (PROTONIX) IV  40 mg Intravenous Q12H    sevelamer carbonate  1.6 g Per OG tube TID     acetaminophen, albuterol-ipratropium, Dextrose 10% Bolus, Dextrose 10% Bolus, glucagon (human recombinant), glucose, glucose, ondansetron, polyethylene glycol, prochlorperazine, senna-docusate 8.6-50 mg, sodium chloride 0.9%    Acute encephalopathy:  Seizures:  -Patient initially presented to ED prior to getting dialysis due to AMS and brown emesis. Had witnessed tonic clonic seizure requiring ativan shortly after getting a CT head. He was loaded with 1500 mg Keppra, intubated for airway protection, and started on propofol for sedation. Upon physical exam, patient remained unresponsive to verbal or tactile stimuli and had upward left sided gaze with sluggish pupils. Concern for status epilepticus.    -UDS negative, alcohol level wnl  -Sepsis:  Initial lactate 2.4, likely due to seizure event; repeat was 1.9. Patient received 500 ml saline in ED. Initial blood cultures, sputum culture+gram stain neg  Lumbar puncture done, CSF not convincing for meningitis, so ampicillin, vancomycin, rocephin d/c'd on 01/22. However, pt was febrile on 11/23 to 101.3. Blood cultures repeated and restarted on Vanc and Zosyn. Repeat cultures NGTD. HSV PCR neg; Acyclovir discontinued. Pt has remained afebrile. Vanc and Zosyn discontinued 01/26.   -Intracranial: patient with history of ICH with no functional deficits. Possibly multiple foci for seizure overtime. CT without acute changes, MRI brain with chronic changes and hypertensive angiopathy; no acute changes.  - PRES: Patient off cardene drip. MRI reviewed. Will follow blood pressure as patient is normally hypotensive.   -Spot EEG without evidence of current seizure. However patient already received keppra, ativan, and sedated on propofol. 24 hour EEG with L sided structural lesion and underlying epileptiform potential. Initiated 1500 mg keppra on 01/22, per Neurology recs.  -Neurology consulted. repeat EEG on 01/27 shows diffuse slowing, though no focal activity seen on EEG.   -01/28 patient's mental status is continuing to improve day to day, now following commands. 01/30 Patient is alert and following commands. Passed SBT and extubated. Breathing well though with generalized weakness as well as weak cough.   -Mental status continues to improve daily   -continue keppra 250mg BID per neuro recs   -continue aspiration precautions, fall precautions, seizure precaution  -neuro checks  -work with OT/PT/SLP      Oropharyngeal dysphagia:  -likely secondary to acute encephalopathy  -SLP consulted, recommending to keep patient NPO for now  -NG tube placed  -start tube feeds  -aspiration precaution  -continue to work with SLP, advance diet as tolerated     Renovascular hypertension  -Patient on coreg at nursing home; has been  compliant as given by nursing home. Patient with hypertensive emergency on admit and started Cardene drip to titrate to BP of 160-180. Off Cardene drip since 01/20.   -Goal SBP < 160 per Neurology.   -Continue home carvedilol 3.125 mg BID    ESRD on HD:  -Pt undergoes HD MWF outpatient  -Consult nephrology to continue HD inpatient   -Renally dose medications and avoid nephrotoxic medications to preserve residual renal function   -Strict I/O's and daily weights  -Continue to monitor renal function with daily labs     Chronic diastolic heart failure  -Last echo done at [facility] [place] [facility] 8/2/19, EF>55%, bi-atrial enlargement  -No signes of exacerbation   -Continue to monitor on telemetry  -Monitor intake and output and obtain daily STANDING weights  -Fluid restriction to 1.5L per day and cardiac diet with salt restriction  -Supplemental O2 to keep Spo2 >90%    Severe malnutrition:  -Nutrition consulted  -Novasource @ 30 mL/hr to provide 1440 kcals, 65 g of protein, 516 mL fluid.   -Continue TFs     Gastrointestinal hemorrhage with hematemesis:  -Patient with reports of coffee ground emesis prior to transport to ED. In ED, dark brown contents suctioned from stomach. Hemoglobin remains stable and pt does not have any HD instability. Patient is well known to GI. His most recent scope was in November that just showed esophagitis similar to his previous EGD.   -GI consulted and appreciate recommendations. No scope indicated at this time  -continue protonix  -continue to monitor H&H daily  -transfuse for hemoglobin under 7  -Nursing / Phlebotomy to use pediatric tubes when drawing labs to minimize iatrogenic anemia and blood loss.      GERD with esophagitis  -Continue Protonix  -Follow up with GI out patient      Discharge plan and follow up: DC home once medically stable     Mariposa Dalton MD  [facility] Medicine Staff  [phone] pager

## 2020-02-04 NOTE — NURSING
Pt has not returned from HD. NGT will need to be placed on night shift. Will relay information to night shift.

## 2020-02-04 NOTE — PT/OT/SLP PROGRESS
"Occupational Therapy   Treatment    Name: Vaughn Retana  MRN: [id number]  Admitting Diagnosis:  Seizure       Recommendations:     Discharge Recommendations: nursing facility, skilled  Discharge Equipment Recommendations:  (TBD)  Barriers to discharge:  Decreased caregiver support(Increased assistance needed)    Assessment:     Vaughn Retana is a 54 y.o. male with a medical diagnosis of Seizure.  He presents with performance deficits including weakness, impaired endurance, impaired self care skills, impaired functional mobilty, gait instability, impaired balance, visual deficits, impaired cognition, decreased coordination, decreased upper extremity function, decreased lower extremity function, decreased safety awareness, decreased ROM, impaired coordination, impaired fine motor. Pt with improved alertness and participation this session, able to verbalize and communicate with OT in room. Pt presenting with continued weakness, decreased ROM, decreased safety awareness, and cognitive delay. Pt would continue to benefit from acute skilled OT to improve functional independence.    Rehab Prognosis:  Fair; patient would benefit from acute skilled OT services to address these deficits and reach maximum level of function.       Plan:     Patient to be seen 3 x/week to address the above listed problems via self-care/home management, therapeutic activities, therapeutic exercises, neuromuscular re-education  · Plan of Care Expires: 03/01/20  · Plan of Care Reviewed with: patient    Subjective   "Where did I use to live at?" "I used to walk all over."  Pain/Comfort:  · Pain Rating 1: (Numerical value not assessed)  · Location - Orientation 1: generalized  · Location 1: back  · Pain Addressed 1: Reposition, Distraction    Objective:     Communicated with: RN prior to session.  Patient found HOB elevated with bed alarm, blood pressure cuff, pressure relief boots, EEG, stevens catheter upon OT entry to room. Pt agreeable to work " with OT this session.    General Precautions: Standard, aspiration, NPO, fall   Orthopedic Precautions:N/A   Braces: N/A     Occupational Performance:     Bed Mobility:    · Patient completed Rolling/Turning to Left with  moderate assistance  · Patient completed Rolling/Turning to Right with maximal assistance  · Patient completed Scooting to EOB with maximal assistance  · Patient completed Supine to Sit with maximal assistance  · Patient completed Sit to Supine with maximal assistance; observed increased fatigue     Functional Mobility/Transfers:  · Not assessed this date due to missing prothesis for LLE and increased fatigue.    Activities of Daily Living:  · Grooming: contact guard assistance to maintain seated balance EOB to wash face.      [facility] 6 Click ADL: 12    Treatment & Education:  OT role and POC reviewed. Pt more alert this session able to answer some PLOF/living environment questions. Unable to remember where he was living before. Reports that he used a walker and has a prosthesis for his leg, communicated with niece via telephone call and she confirmed and reported that he was living at [facility]. Pt with continued poor EOB sitting balance, able to maintain with CGA for short periods of time but required MOD A and max verbal cueing to maintain posture. Pt reported blurriness of vision when initially sat up that slowly began to resolve with continued sitting.     Patient left HOB elevated with all lines intact, call button in reach and RN notifiedEducation:      GOALS:   Multidisciplinary Problems     Occupational Therapy Goals        Problem: Occupational Therapy Goal    Goal Priority Disciplines Outcome Interventions   Occupational Therapy Goal     OT, PT/OT Ongoing, Progressing    Description:  Goals to be met by: 2 weeks (2/15/20)     Patient will increase functional independence with ADLs by performing:    Grooming while seated with Moderate Assistance.  Sitting at edge of bed x8  minutes with Contact Guard Assistance.  Supine to sit with Moderate Assistance.  Squat pivot transfers with Maximum Assistance.  Increased functional strength to WFL for ADLs and mobility tasks.  Pt will follow 3/3 one-step commands to participate in ADL task.                      Time Tracking:     OT Date of Treatment: 02/04/20  OT Start Time: 1028  OT Stop Time: 1051  OT Total Time (min): 23 min    Billable Minutes:Self Care/Home Management 11 minutes  Therapeutic Activity 12 minutes    RANDELL Davison  2/4/2020

## 2020-02-04 NOTE — PLAN OF CARE
Continue OT plan of care.    Problem: Occupational Therapy Goal  Goal: Occupational Therapy Goal  Description  Goals to be met by: 2 weeks (2/15/20)     Patient will increase functional independence with ADLs by performing:    Grooming while seated with Moderate Assistance.  Sitting at edge of bed x8 minutes with Contact Guard Assistance.  Supine to sit with Moderate Assistance.  Squat pivot transfers with Maximum Assistance.  Increased functional strength to WFL for ADLs and mobility tasks.  Pt will follow 3/3 one-step commands to participate in ADL task.     Outcome: Ongoing, Progressing

## 2020-02-04 NOTE — PLAN OF CARE
Problem: Malnutrition  Goal: Improved Nutritional Intake  Outcome: Ongoing, Progressing   Recommendations     1. To better meet pt with severe malnutrition's needs, increase TF to new goal rate 44ml/hr (provides 2112 kcal, 96g pro, 1056ml free water). Additional 100ml water flush Q6hrs or per MD. Hold for residuals>500ml.  Goals: Meet % EEN, EPN  Nutrition Goal Status: progressing towards goal  Communication of RD Recs: reviewed with physician

## 2020-02-04 NOTE — PT/OT/SLP PROGRESS
Physical Therapy      Patient Name:  Vaughn Retana   MRN:  [id number]    Pt not seen on this date; Chart reviewed and pt remain appropriate for PT services at this time.  Will see pt as schedule allows.      Yimi Mark, PT,DPT  2/4/2020

## 2020-02-04 NOTE — PROGRESS NOTES
"[facility]  Adult Nutrition  Progress Note    SUMMARY       Recommendations    1. To better meet pt with severe malnutrition's needs, increase TF to new goal rate 44ml/hr (provides 2112 kcal, 96g pro, 1056ml free water). Additional 100ml water flush Q6hrs or per MD. Hold for residuals>500ml.  Goals: Meet % EEN, EPN  Nutrition Goal Status: progressing towards goal  Communication of RD Recs: reviewed with physician    Reason for Assessment    Reason For Assessment: RD follow-up  Diagnosis: other (see comments)(Seizures)  Relevant Medical History: HTN, ESRD on HD, L. BKA  Interdisciplinary Rounds: attended  General Information Comments: Pt noted  with severe malnutrition r/t severe wt loss and muscle/fat depletion. Pt extubated, continues on enteral feeding. Pt pulled out NG tube last night,  tube needs to be reinserted.  Nutrition Discharge Planning: unable to assess at this time.    Nutrition Risk Screen    Nutrition Risk Screen: dysphagia or difficulty swallowing    Nutrition/Diet History    Spiritual, Cultural Beliefs, Jain Practices, Values that Affect Care: no  Factors Affecting Nutritional Intake: NPO    Anthropometrics    Temp: 98.8 °F (37.1 °C)  Height: 5' 6" (167.6 cm)  Height (inches): 66 in  Weight Method: Bed Scale  Weight: 60.2 kg (132 lb 11.5 oz)  Weight (lb): 132.72 lb  Ideal Body Weight (IBW), Male: 142 lb  % Ideal Body Weight, Male (lb): 88.49 %  BMI (Calculated): 21.4  BMI Grade: 18.5-24.9 - normal  Usual Body Weight (UBW), k kg  % Usual Body Weight: 86.54  % Weight Change From Usual Weight: -13.64 %  Amputation %: 5.9  Total Amputation %: 5.9  Amputation Ideal Body Weight (IBW), Male (lb): 136.1 lb  Amputee BMI (kg/m2): 21.61 kg/m2       Lab/Procedures/Meds    Pertinent Labs Reviewed: reviewed  Pertinent Labs Comments: BUN 41, Creat 7.2, Glu 64, Phos 5.9, Alk phos 236, Alb 2.8  Pertinent Medications Reviewed: reviewed  Pertinent Medications Comments: " pantoprazole, sevelamer carbonate      Estimated/Assessed Needs    Weight Used For Calorie Calculations: 60.2 kg (132 lb 11.5 oz)(pt extubated, nutrition reassessment)  Energy Calorie Requirements (kcal): 2107 kcal  Energy Need Method: Kcal/kg  Protein Requirements: 90-103g/day  Weight Used For Protein Calculations: 60.2 kg (132 lb 11.5 oz)     Estimated Fluid Requirement Method: other (see comments)(Per MD or 1 mL/kcal)  RDA Method (mL): 2107         Nutrition Prescription Ordered    Current Diet Order: NPO  Current Nutrition Support Formula Ordered: Novasource Renal  Current Nutrition Support Rate Ordered: 30 (ml)  Current Nutrition Support Frequency Ordered: mL/hr    Evaluation of Received Nutrient/Fluid Intake    Enteral Calories (kcal): 1440  Enteral Protein (gm): 65  Enteral (Free Water) Fluid (mL): 516  Other Calories (kcal): 0  % Kcal Needs: 68  % Protein Needs: 72  Energy Calories Required: not meeting needs  Protein Required: not meeting needs  Fluid Required: not meeting needs  Comments: LBM 2/1  Tolerance: tolerating  % Intake of Estimated Energy Needs: 0 - 25 %  % Meal Intake: NPO    Nutrition Risk    Level of Risk/Frequency of Follow-up: high     Assessment and Plan    Severe malnutrition    Nutrition Problem:  Severe Protein-Calorie Malnutrition  Malnutrition in the context of Chronic Illness/Injury    Related to (etiology):  Inadequate energy intake    Signs and Symptoms (as evidenced by):  Body Fat Depletion: severe depletion of orbitals and triceps   Muscle Mass Depletion: severe depletion of temples, clavicle region and interosseous muscle   Weight Loss: 14% x 6 months     Interventions(treatment strategy):  Collaboration of nutrition care w/ other providers    Nutrition Diagnosis Status:  Continues           Monitor and Evaluation    Food and Nutrient Intake: energy intake, enteral nutrition intake  Food and Nutrient Adminstration: enteral and parenteral nutrition administration  Physical  Activity and Function: nutrition-related ADLs and IADLs  Anthropometric Measurements: weight, weight change  Biochemical Data, Medical Tests and Procedures: inflammatory profile, lipid profile, glucose/endocrine profile, gastrointestinal profile, electrolyte and renal panel  Nutrition-Focused Physical Findings: overall appearance     Malnutrition Assessment             Weight Loss (Malnutrition): greater than 10% in 6 months  Energy Intake (Malnutrition): other (see comments)(GONZALO)  Subcutaneous Fat (Malnutrition): severe depletion  Muscle Mass (Malnutrition): severe depletion   Orbital Region (Subcutaneous Fat Loss): severe depletion  Upper Arm Region (Subcutaneous Fat Loss): severe depletion   [place] Region (Muscle Loss): severe depletion  Clavicle Bone Region (Muscle Loss): severe depletion  Scapular Bone Region (Muscle Loss): severe depletion  Dorsal Hand (Muscle Loss): severe depletion  Anterior Thigh Region (Muscle Loss): (GONZALO)  Posterior Calf Region (Muscle Loss): (GONZALO)                 Nutrition Follow-Up    RD Follow-up?: Yes

## 2020-02-04 NOTE — PT/OT/SLP PROGRESS
"Speech Language Pathology Treatment    Patient Name:  Vaughn Retana   MRN:  [id number]   955/955 A    Admitting Diagnosis: Seizure    Recommendations:                 General Recommendations:  Dysphagia therapy  Diet recommendations:  NPO, Liquid Diet Level: NPO   Aspiration Precautions: Strict aspiration precautions   General Precautions: Standard, aspiration, fall, NPO  Communication strategies:  go to room if call light pushed    Subjective     "Where are we at?"    Pain/Comfort:  · Pain Rating 1: 0/10  · Pain Rating Post-Intervention 1: 0/10    Objective:     Has the patient been evaluated by SLP for swallowing?   Yes  Keep patient NPO? Yes   Current Respiratory Status: room air      Pt transitioning to sleep state upon entry. Fully awake and alert state easily achieved with gentle verbal stimulation. HOB raised. Pt observed with the following PO trials: thin water via cup sip x1, nectar thickened water via cup sip x1, honey thickened water via cup sip x1, pureed apple sauce via 1/2 tsp x6. Wet vocal quality followed by delayed cough noted following thin liquid bolus. Wet vocal quality also observed following nectar thickened and honey thickened liquid boluses. Unable to determine if delayed cough following 4th pureed bolus spontaneous vs 2/2 dec'd tolerance for pureed solids. Education provided to pt re: definition/ risk/ overt clinical signs aspiration, ongoing SLP recommendation for NPO 2/2 overt clinical signs aspiration as documented above, and ongoing SLP POC. Pt verbalized understanding of all education provided and agreement with SLP POC. No further questions.     Results discussed with NSG who verbalized understanding of all information provided.     Assessment:     Vaughn Retana is a 54 y.o. male with an SLP diagnosis of dysphagia.     Goals:   Multidisciplinary Problems     SLP Goals        Problem: SLP Goal    Goal Priority Disciplines Outcome   SLP Goal     SLP Ongoing, Progressing "   Description:  Speech Language Pathology  Goals expected to be met by 2/7:  1. Pt will participate in ongoing assessment of swallow to determine safest, least restrictive diet.                   Plan:     · Patient to be seen:  4 x/week   · Plan of Care expires:  02/29/20  · Plan of Care reviewed with:  patient   · SLP Follow-Up:  Yes       Discharge recommendations:  nursing facility, skilled     Time Tracking:     SLP Treatment Date:   02/04/20  Speech Start Time:  1133  Speech Stop Time:  1150     Speech Total Time (min):  17 min    Billable Minutes: Treatment Swallowing Dysfunction 9 and Seld Care/Home Management Training 8    NALDO Holden, CCC-SLP  [phone]  2/4/2020

## 2020-02-04 NOTE — PROGRESS NOTES
Pt completed 3 hours dialysis via right arm fistula.  Needles removed and pressure held for 5 minutes.  Hemostasis achieved. drsg applied. Net removal 1 liter, pt tolerated without difficulty.  Report called to Yvette.  Pt waiting for escort to return to room.

## 2020-02-04 NOTE — PLAN OF CARE
02/04/20 1556   Discharge Reassessment   Assessment Type Discharge Planning Reassessment   Provided patient/caregiver education on the expected discharge date and the discharge plan Yes   Do you have any problems affording any of your prescribed medications? No   Discharge Plan A Return to nursing home;Skilled Nursing Facility   Discharge Plan B Return to Nursing Home   DME Needed Upon Discharge  none   Anticipated Discharge Disposition SNF   Can the patient answer the patient profile reliably? No, cognitively impaired   How does the patient rate their overall health at the present time? Poor   Describe the patient's ability to walk at the present time. Does not walk or unable to take any steps at all   How often would a person be available to care for the patient? Often   Number of comorbid conditions (as recorded on the chart) Five or more     Referral sent to [facility] for SNF (currently nursing home)    Milagro Madrid RN  Case Management  Ext: [phone]  02/04/2020  3:59 PM

## 2020-02-04 NOTE — PLAN OF CARE
POC reviewed with pt at 1700. NGT to L nare placed, waiting for x-ray of abdomen to confirm placement. Pt verbalized understanding. Questions and concerns addressed. No acute events today. Pt progressing toward goals. Will continue to monitor. See flowsheets for full assessment and VS info.

## 2020-02-04 NOTE — PLAN OF CARE
02/04/20 1420   Post-Acute Status   Post-Acute Authorization Placement   Post-Acute Placement Status Awaiting Internal Medical Clearance       Pt is a resident of [facility].  Has been accepted by them for SNF once he is D/C from the hospital.  SW in contact with CM and Medical staff. Will continue to follow and offer support as needed.     Nando Gordon, NERY  [facility]   Ext. [phone]

## 2020-02-04 NOTE — PROGRESS NOTES
Hospital Medicine   Progress note      [place] [facility] [place] Mariposa Dalton MD   Admit Date: 1/20/2020   Hospital Day: 15  JUAN: 2/5/2020   Code status: Full Code   Principal Problem: Seizure     Summary: Patient is a 54 year old male with extensive medical history including ESRD on dialysis MWF (has not received it today), L below knee amputation 2/2 osteomyelitis, dCHF (last echo 8/2/19 [facility]), GERD, h/o multiple ICH w/o residual deficits, and multiple instances of GI bleed (last EGD 11/12/19, esophagitis) who presents to ED [facility] nursing home due to altered mental status. From NH report, nursing home staff went to wake the patient for his morning dialysis. He was confused, lethargic, had a moderate amount of brown colored emesis in his trash can. Patient last got dialysis on Friday. Patient is normally able to ambulate on his own and is alert and oriented; nursing staff reports that he appeared normal day before admission. Patient admitted to critical care with concerns of new onset seizures and s/p intubation for airway protection. Patient was started on vanc, rocephin, ampicillin, and acyclovir to cover for meningitis. Patient had spot EEG while on propofol in ED which did not show seizure activity. MRI done showed chronic changes but no acute abnormalities. Lumbar puncture was done after MRI. CSF labs were not convincing for bacterial or viral meningitis and antibiotics were weaned down. Continuous EEG was done after propofol was stopped which showed epileptiform discharges. Per epilepsy, patient was given another dose of Keppra. Nephrology consulted and started HD. Pt became febrile the night of 01/22; blood cultures repeated and restarted on Vanc and Zosyn. HSV PCR neg so Acyclovir discontinued.  Neurology was consulted and pt was continued on Keppra but dose was decreased. 01/27 Patient's neuro exam is somewhat improved from day prior per nurse - opening eyes spontaneously and occasionally tracking  "movements, blinking to threat. 01/28 passed SBT today, plan to extubate 01/29. Repeat 24 hr EEG shows "multifocal slowing consistent with multiple areas of focal cortical dysfunction and occasional epileptiform discharges in the left frontotemporal region consistent with a potential seizure focus in this area" with no electrographic seizures. 01/29 Originally planned for extubation, but was deferred in light of BRBPR ~08:30 per nurse. GI consulted, no scope indicated at this time. Otherwise mental status improving in small increments daily. 01/30 Patient successfully extubated, though very weak. Unable to clear secretions effectively at this time, weak cough. 2/1- lethargic, EEG was done with no seizure activity, Keppra dose was decreased.  2/2 - pt was more awake after decreasing Keppra dose further to 250mg BID. Pt not able to answer simple questions appropriate and follow simple commands. SLP following but pt continues to fail swallow study. NGT placed. Pt started on tube feeds.     Interval hx:   Pt was seen and examined at bedside. Pt had no acute events overnight, and no new complaints this morning. Pt remained hemodynamically stable and afebrile.  Patient continues to be drowsy but easily arousable.  He was oriented x3.  Patient was able to answer simple questions and follow simple commands appropriately.  Patient pulled out his NG tube overnight, will need to be reinserted today.  In the meantime, Keppra changed to IV.    ROS (Positive in Bold, otherwise negative)  Constitutional: fever, chills, night sweats  CV: chest pain, edema, palpitations  Resp: SOB, cough, sputum production  GI: changes in appetite, NVDC, pain, melena, hematochezia, GERD, hematemesis  : Dysuria, hematuria, urinary urgency, frequency  MSK: arthralgia/myalgia, joint swelling  Neuro/Psych: anxiety, depression    PEx   Temp:  [97.7 °F (36.5 °C)-98.8 °F (37.1 °C)]   Pulse:  []   Resp:  [17-19]   BP: ()/(59-93)   SpO2:  [96 " %-100 %]      I & O (Last 24H):     Intake/Output Summary (Last 24 hours) at 2/4/2020 1115  Last data filed at 2/4/2020 0624  Gross per 24 hour   Intake 600 ml   Output 1752 ml   Net -1152 ml       General:  male  in no acute distress. Nontoxic. Resting in bed. Cooperative.  Drowsy but easily arousable.  HEENT: NCAT. PERRL. EOMI. Sclera Anicteric.  CVS: RRR. Normal S1 S2. No murmurs  Pulm: CTAB. Normal respiratory effort. No wheezes, rhonchi, or crackles.  Abdomen: Soft. Non-distended. No tenderness to palpation. No rebound or guarding. +BS.  Extremities: No edema. No cyanosis. Full ROM.  Globally weak  Neuro: Alert, oriented x 4, Spont mvt of all extremities with no focal deficits noted. Globally weak.  Able to answer questions appropriately and follow simple commands.    Recent Results (from the past 24 hour(s))   Comprehensive metabolic panel    Collection Time: 02/03/20  2:16 PM   Result Value Ref Range    Sodium 141 136 - 145 mmol/L    Potassium 4.4 3.5 - 5.1 mmol/L    Chloride 102 95 - 110 mmol/L    CO2 20 (L) 23 - 29 mmol/L    Glucose 84 70 - 110 mg/dL    BUN, Bld 83 (H) 6 - 20 mg/dL    Creatinine 10.5 (H) 0.5 - 1.4 mg/dL    Calcium 10.2 8.7 - 10.5 mg/dL    Total Protein 8.9 (H) 6.0 - 8.4 g/dL    Albumin 2.6 (L) 3.5 - 5.2 g/dL    Total Bilirubin 0.4 0.1 - 1.0 mg/dL    Alkaline Phosphatase 219 (H) 55 - 135 U/L    AST 23 10 - 40 U/L    ALT 19 10 - 44 U/L    Anion Gap 19 (H) 8 - 16 mmol/L    eGFR if African American 5.7 (A) >60 mL/min/1.73 m^2    eGFR if non African American 4.9 (A) >60 mL/min/1.73 m^2   Magnesium    Collection Time: 02/03/20  2:16 PM   Result Value Ref Range    Magnesium 2.8 (H) 1.6 - 2.6 mg/dL   Phosphorus    Collection Time: 02/03/20  2:16 PM   Result Value Ref Range    Phosphorus 5.8 (H) 2.7 - 4.5 mg/dL   CBC auto differential    Collection Time: 02/03/20  2:16 PM   Result Value Ref Range    WBC 7.30 3.90 - 12.70 K/uL    RBC 3.96 (L) 4.60 - 6.20 M/uL    Hemoglobin 11.1 (L)  14.0 - 18.0 g/dL    Hematocrit 35.6 (L) 40.0 - 54.0 %    Mean Corpuscular Volume 90 82 - 98 fL    Mean Corpuscular Hemoglobin 28.0 27.0 - 31.0 pg    Mean Corpuscular Hemoglobin Conc 31.2 (L) 32.0 - 36.0 g/dL    RDW 15.9 (H) 11.5 - 14.5 %    Platelets 323 150 - 350 K/uL    MPV 11.9 9.2 - 12.9 fL    Immature Granulocytes 0.5 0.0 - 0.5 %    Gran # (ANC) 5.2 1.8 - 7.7 K/uL    Immature Grans (Abs) 0.04 0.00 - 0.04 K/uL    Lymph # 1.2 1.0 - 4.8 K/uL    Mono # 0.6 0.3 - 1.0 K/uL    Eos # 0.3 0.0 - 0.5 K/uL    Baso # 0.03 0.00 - 0.20 K/uL    nRBC 0 0 /100 WBC    Gran% 70.7 38.0 - 73.0 %    Lymph% 16.2 (L) 18.0 - 48.0 %    Mono% 8.4 4.0 - 15.0 %    Eosinophil% 3.8 0.0 - 8.0 %    Basophil% 0.4 0.0 - 1.9 %    Differential Method Automated    Comprehensive metabolic panel    Collection Time: 02/04/20  8:30 AM   Result Value Ref Range    Sodium 140 136 - 145 mmol/L    Potassium 4.5 3.5 - 5.1 mmol/L    Chloride 97 95 - 110 mmol/L    CO2 25 23 - 29 mmol/L    Glucose 64 (L) 70 - 110 mg/dL    BUN, Bld 41 (H) 6 - 20 mg/dL    Creatinine 7.2 (H) 0.5 - 1.4 mg/dL    Calcium 10.6 (H) 8.7 - 10.5 mg/dL    Total Protein 9.7 (H) 6.0 - 8.4 g/dL    Albumin 2.8 (L) 3.5 - 5.2 g/dL    Total Bilirubin 0.5 0.1 - 1.0 mg/dL    Alkaline Phosphatase 236 (H) 55 - 135 U/L    AST 32 10 - 40 U/L    ALT 20 10 - 44 U/L    Anion Gap 18 (H) 8 - 16 mmol/L    eGFR if African American 9.0 (A) >60 mL/min/1.73 m^2    eGFR if non  7.8 (A) >60 mL/min/1.73 m^2   Magnesium    Collection Time: 02/04/20  8:30 AM   Result Value Ref Range    Magnesium 2.4 1.6 - 2.6 mg/dL   Phosphorus    Collection Time: 02/04/20  8:30 AM   Result Value Ref Range    Phosphorus 5.9 (H) 2.7 - 4.5 mg/dL   CBC auto differential    Collection Time: 02/04/20  8:30 AM   Result Value Ref Range    WBC 6.55 3.90 - 12.70 K/uL    RBC 4.37 (L) 4.60 - 6.20 M/uL    Hemoglobin 12.3 (L) 14.0 - 18.0 g/dL    Hematocrit 41.3 40.0 - 54.0 %    Mean Corpuscular Volume 95 82 - 98 fL    Mean  Corpuscular Hemoglobin 28.1 27.0 - 31.0 pg    Mean Corpuscular Hemoglobin Conc 29.8 (L) 32.0 - 36.0 g/dL    RDW 16.5 (H) 11.5 - 14.5 %    Platelets 306 150 - 350 K/uL    MPV 10.9 9.2 - 12.9 fL    Immature Granulocytes 0.6 (H) 0.0 - 0.5 %    Gran # (ANC) 3.8 1.8 - 7.7 K/uL    Immature Grans (Abs) 0.04 0.00 - 0.04 K/uL    Lymph # 1.4 1.0 - 4.8 K/uL    Mono # 1.0 0.3 - 1.0 K/uL    Eos # 0.3 0.0 - 0.5 K/uL    Baso # 0.04 0.00 - 0.20 K/uL    nRBC 0 0 /100 WBC    Gran% 58.6 38.0 - 73.0 %    Lymph% 21.2 18.0 - 48.0 %    Mono% 15.0 4.0 - 15.0 %    Eosinophil% 4.0 0.0 - 8.0 %    Basophil% 0.6 0.0 - 1.9 %    Differential Method Automated        Recent Labs   Lab 02/01/20  1430 02/01/20  1855 02/01/20  2341 02/02/20  0523 02/02/20  1513 02/03/20  0812   POCTGLUCOSE 122* 79 145* 192* 104 109       Hemoglobin A1C   Date Value Ref Range Status   11/09/2019 4.0 4.0 - 5.6 % Final     Comment:     ADA Screening Guidelines:  5.7-6.4%  Consistent with prediabetes  >or=6.5%  Consistent with diabetes  High levels of fetal hemoglobin interfere with the HbA1C  assay. Heterozygous hemoglobin variants (HbS, HgC, etc)do  not significantly interfere with this assay.   However, presence of multiple variants may affect accuracy.     06/22/2019 4.7 4.0 - 5.6 % Final     Comment:     ADA Screening Guidelines:  5.7-6.4%  Consistent with prediabetes  >or=6.5%  Consistent with diabetes  High levels of fetal hemoglobin interfere with the HbA1C  assay. Heterozygous hemoglobin variants (HbS, HgC, etc)do  not significantly interfere with this assay.   However, presence of multiple variants may affect accuracy.     06/22/2019 4.7 4.0 - 5.6 % Final     Comment:     ADA Screening Guidelines:  5.7-6.4%  Consistent with prediabetes  >or=6.5%  Consistent with diabetes  High levels of fetal hemoglobin interfere with the HbA1C  assay. Heterozygous hemoglobin variants (HbS, HgC, etc)do  not significantly interfere with this assay.   However, presence of multiple  variants may affect accuracy.          Active Hospital Problems    Diagnosis  POA    *Seizure [R56.9]  Yes    ESRD on dialysis [N18.6, Z99.2]  Not Applicable    Aphasia [R47.01]  Clinically Undetermined    Pressure injury due to medical device [T85.898A, L89.90]  Yes    Altered mental status [R41.82]  Yes    Gastrointestinal hemorrhage with hematemesis [K92.0]  Yes    GERD with esophagitis [K21.0]  Yes    Severe malnutrition [E43]  Yes     Assessment and Plan  Severe Malnutrition in the context of Social/Environmental Circumstances     Related to (etiology):  Unknown etiology     Signs and Symptoms (as evidenced by):  Energy Intake: per pt, <75% intake over the last year  Body Fat Depletion: overall subcutaneous fat loss, moderate triceps fat loss and protruding patellas.  Muscle Mass Depletion:  moderate muscle wasting of interosseous, temporal, clavicle, calves and quadriceps  Weight Loss: 24% (39 lbs) x 1 year    Recommendations     Recommendation/Intervention: 1. Should pt be cleared for po diet, recommend renal diet with texture per SLP along with Novasource ONS TID. 2. Should pt require enteral feeding, initiate Novasource renal formula at 10ml/hr and advance 10ml Q4hrs to goal rate of 40ml/hr (provides 1920kcal, 87g pro, 691ml free water). Provide additional 500ml water flush/24 hrs. Hold for residuals >500ml.  Goals: 1. Pt to receive nutrition < 48 hrs.      Renovascular hypertension [I15.0]  Yes     Chronic    Chronic diastolic heart failure [I50.32]  Yes     Chronic     2-23-17    1 - Low normal to mildly depressed left ventricular systolic function (EF 50-55%).     2 - Right ventricular enlargement with mildly depressed systolic function.     3 - Left ventricular diastolic dysfunction.     4 - Right atrial enlargement.     5 - Severe tricuspid regurgitation.     6 - Pulmonary hypertension. The estimated PA systolic pressure is 86 mmHg.     7 - Increased central venous pressure.        Encephalopathy [G93.40]  Yes      Resolved Hospital Problems    Diagnosis Date Resolved POA    Acute metabolic encephalopathy [G93.41] 01/23/2020 Yes    ESRD on hemodialysis [N18.6, Z99.2] 01/31/2020 Not Applicable     Chronic     MWF at [facility]            Assessment and Plan for problems addressed today:      [START ON 2/5/2020] sodium chloride 0.9%   Intravenous Once    carvediloL  3.125 mg Per OG tube BID    heparin (porcine)  5,000 Units Subcutaneous Q8H    levetiracetam IVPB  250 mg Intravenous Q12H    midodrine  5 mg Per NG tube Every Mon, Wed, Fri    pantoprozole (PROTONIX) IV  40 mg Intravenous Q12H    sevelamer carbonate  1.6 g Per OG tube TID     acetaminophen, albuterol-ipratropium, Dextrose 10% Bolus, Dextrose 10% Bolus, glucagon (human recombinant), glucose, glucose, ondansetron, polyethylene glycol, prochlorperazine, senna-docusate 8.6-50 mg, sodium chloride 0.9%    Acute encephalopathy:  Seizures:  -Patient initially presented to ED prior to getting dialysis due to AMS and brown emesis. Had witnessed tonic clonic seizure requiring ativan shortly after getting a CT head. He was loaded with 1500 mg Keppra, intubated for airway protection, and started on propofol for sedation. Upon physical exam, patient remained unresponsive to verbal or tactile stimuli and had upward left sided gaze with sluggish pupils. Concern for status epilepticus.    -UDS negative, alcohol level wnl  -Sepsis: Initial lactate 2.4, likely due to seizure event; repeat was 1.9. Patient received 500 ml saline in ED. Initial blood cultures, sputum culture+gram stain neg  Lumbar puncture done, CSF not convincing for meningitis, so ampicillin, vancomycin, rocephin d/c'd on 01/22. However, pt was febrile on 11/23 to 101.3. Blood cultures repeated and restarted on Vanc and Zosyn. Repeat cultures NGTD. HSV PCR neg; Acyclovir discontinued. Pt has remained afebrile. Vanc and Zosyn discontinued 01/26.   -Intracranial: patient with  history of ICH with no functional deficits. Possibly multiple foci for seizure overtime. CT without acute changes, MRI brain with chronic changes and hypertensive angiopathy; no acute changes.  - PRES: Patient off cardene drip. MRI reviewed. Will follow blood pressure as patient is normally hypotensive.   -Spot EEG without evidence of current seizure. However patient already received keppra, ativan, and sedated on propofol. 24 hour EEG with L sided structural lesion and underlying epileptiform potential. Initiated 1500 mg keppra on 01/22, per Neurology recs.  -Neurology consulted. repeat EEG on 01/27 shows diffuse slowing, though no focal activity seen on EEG.   -01/28 patient's mental status is continuing to improve day to day, now following commands. 01/30 Patient is alert and following commands. Passed SBT and extubated. Breathing well though with generalized weakness as well as weak cough.   -Mental status continues to improve daily   -continue keppra 250mg BID per neuro recs   -continue aspiration precautions, fall precautions, seizure precaution  -neuro checks  -work with OT/PT/SLP      Oropharyngeal dysphagia:  -likely secondary to acute encephalopathy  -SLP consulted, recommending to keep patient NPO for now  -patient pulled out his NG tube overnight, will need to be replaced this morning  -start tube feeds once NG-tube is in place  -aspiration precaution  -continue to work with SLP, advance diet as tolerated     Renovascular hypertension  -Patient on coreg at nursing home; has been compliant as given by nursing home. Patient with hypertensive emergency on admit and started Cardene drip to titrate to BP of 160-180. Off Cardene drip since 01/20.   -Goal SBP < 160 per Neurology.   -Continue home carvedilol 3.125 mg BID    ESRD on HD:  -Pt undergoes HD MWF outpatient  -Consult nephrology to continue HD inpatient   -Renally dose medications and avoid nephrotoxic medications to preserve residual renal function    -Strict I/O's and daily weights  -Continue to monitor renal function with daily labs     Chronic diastolic heart failure  -Last echo done at [facility] 8/2/19, EF>55%, bi-atrial enlargement  -No signes of exacerbation   -Continue to monitor on telemetry  -Monitor intake and output and obtain daily STANDING weights  -Fluid restriction to 1.5L per day and cardiac diet with salt restriction  -Supplemental O2 to keep Spo2 >90%    Severe malnutrition:  -Nutrition consulted  -Novasource @ 30 mL/hr to provide 1440 kcals, 65 g of protein, 516 mL fluid.   -Continue TFs     Gastrointestinal hemorrhage with hematemesis:  -Patient with reports of coffee ground emesis prior to transport to ED. In ED, dark brown contents suctioned from stomach. Hemoglobin remains stable and pt does not have any HD instability. Patient is well known to GI. His most recent scope was in November that just showed esophagitis similar to his previous EGD.   -GI consulted and appreciate recommendations. No scope indicated at this time  -continue protonix  -continue to monitor H&H daily  -transfuse for hemoglobin under 7  -Nursing / Phlebotomy to use pediatric tubes when drawing labs to minimize iatrogenic anemia and blood loss.      GERD with esophagitis  -Continue Protonix  -Follow up with GI out patient      Discharge plan and follow up: DC home once medically stable     Mariposa Dalton MD  Hospital Medicine Staff  [phone] pager

## 2020-02-04 NOTE — PLAN OF CARE
Recommend ongoing NPO. Although pt with good progress within SLP sessions since SLP Clinical Evaluation of Swallow completed 1/31/20, overt clinical signs aspiration continue to be observed across consistencies.     NALDO Holden, CCC-SLP  [phone]  2/4/2020

## 2020-02-05 NOTE — PROGRESS NOTES
[facility] Skin Integrity Evaluation      Subjective    [facility] skin integrity champion evaluation of patient as part of the comprehensive skin care team .       Vaughn Retana is being evaluated as per the Epic  pressure injury skin report.  He has been admitted to MICU and subsequently stepped down to Neuroscience progressive unit  for 16 days .   Skin injury was noted on 01/28/20.             Limited Physical exam     Lesion 1: Right medial Nare            Assessment :       Lesion 1:  Skin Tear- HEALING    POA : no    Consults: Wound Care and Nutrition, skin assessment every shift          Follow up:    Patient will be re evaluated weekly.

## 2020-02-05 NOTE — PLAN OF CARE
Problem: Adult Inpatient Plan of Care  Goal: Plan of Care Review  Outcome: Ongoing, Not Progressing   POC reviewed with patient. Patient verbalized understanding. VSS. No complaints of pain. Pt went to dialysis and had 1L of fluid remove. All questions and concerns addressed. Fall and safety measures maintained throughout shift. Will continue to monitor.

## 2020-02-05 NOTE — PROGRESS NOTES
[facility] H[facility]OD[facility] NOTE    Vaughn Retana is a 54 y.o. male currently on hemodialysis for removal of uremic toxins and volume.     Patient seen and evaluated on hemodialysis, tolerating treatment, see HD flowsheet for vitals and assessments.    No Hypotension, chest pain, shortness of breath, cramping, nausea or vomiting.      Labs have been reviewed and the dialysate bath has been adjusted.    Labs:      Recent Labs   Lab 02/03/20  1416 02/04/20  0830 02/05/20  0532    140 137   K 4.4 4.5 5.7*    97 96   CO2 20* 25 20*   BUN 83* 41* 64*   CREATININE 10.5* 7.2* 8.8*   CALCIUM 10.2 10.6* 10.0   PHOS 5.8* 5.9* 7.3*       Recent Labs   Lab 02/02/20  0320 02/03/20  1416 02/04/20  0830   WBC 8.96 7.30 6.55   HGB 10.5* 11.1* 12.3*   HCT 34.2* 35.6* 41.3    323 306        Assessment/Plan    Ultrafiltration goal: 1 L as tolerated, keep MAP >65.  - Seen on dialysis this morning, tolerating session with current UFR, no complications.    - Patient appears restless on exam, increased amount of secretions requiring suction per RN. Restraints noted.   - No lab stick/BP intake on access site  - Will continue dialysis treatments while in-patient  - Continue to monitor intake and output, daily weights   - Avoid nephrotoxic medication and renal dose medications to GFR    Anemia of ESRD  - Hgb within range     BMM  - On TF   - Novasource with meals  - Phos 7.3, on binders   - Daily renal function panel     Glenis Benavidez, ADILIA, APRN, FNP-C  Nephrology Department  Pager:  [phone]

## 2020-02-05 NOTE — PLAN OF CARE
Problem: Electrolyte Imbalance (Chronic Kidney Disease)  Goal: Electrolyte Balance  Outcome: Ongoing, Progressing     Problem: Fluid Volume Excess (Chronic Kidney Disease)  Goal: Fluid Balance  Outcome: Ongoing, Progressing

## 2020-02-05 NOTE — PLAN OF CARE
Recommend ongoing NPO with cont'd NG tube for all nutrition, hydration, medication.     NALDO Holden, CCC-SLP  [phone]  2/5/2020

## 2020-02-05 NOTE — PROGRESS NOTES
Hospital Medicine   Progress note      Team: [facility] G Mariposa Dalton MD   Admit Date: 1/20/2020   Hospital Day: 16  JUAN: 2/7/2020   Code status: Full Code   Principal Problem: Seizure     Summary: Patient is a 54 year old male with extensive medical history including ESRD on dialysis MWF (has not received it today), L below knee amputation 2/2 osteomyelitis, dCHF (last echo 8/2/19 [facility]), GERD, h/o multiple ICH w/o residual deficits, and multiple instances of GI bleed (last EGD 11/12/19, esophagitis) who presents to ED [facility] nursing home due to altered mental status. From NH report, nursing home staff went to wake the patient for his morning dialysis. He was confused, lethargic, had a moderate amount of brown colored emesis in his trash can. Patient last got dialysis on Friday. Patient is normally able to ambulate on his own and is alert and oriented; nursing staff reports that he appeared normal day before admission. Patient admitted to critical care with concerns of new onset seizures and s/p intubation for airway protection. Patient was started on vanc, rocephin, ampicillin, and acyclovir to cover for meningitis. Patient had spot EEG while on propofol in ED which did not show seizure activity. MRI done showed chronic changes but no acute abnormalities. Lumbar puncture was done after MRI. CSF labs were not convincing for bacterial or viral meningitis and antibiotics were weaned down. Continuous EEG was done after propofol was stopped which showed epileptiform discharges. Per epilepsy, patient was given another dose of Keppra. Nephrology consulted and started HD. Pt became febrile the night of 01/22; blood cultures repeated and restarted on Vanc and Zosyn. HSV PCR neg so Acyclovir discontinued.  Neurology was consulted and pt was continued on Keppra but dose was decreased. 01/27 Patient's neuro exam is somewhat improved from day prior per nurse - opening eyes spontaneously and occasionally tracking  "movements, blinking to threat. 01/28 passed SBT today, plan to extubate 01/29. Repeat 24 hr EEG shows "multifocal slowing consistent with multiple areas of focal cortical dysfunction and occasional epileptiform discharges in the left frontotemporal region consistent with a potential seizure focus in this area" with no electrographic seizures. 01/29 Originally planned for extubation, but was deferred in light of BRBPR ~08:30 per nurse. GI consulted, no scope indicated at this time. Otherwise mental status improving in small increments daily. 01/30 Patient successfully extubated, though very weak. Unable to clear secretions effectively at this time, weak cough. 2/1- lethargic, EEG was done with no seizure activity, Keppra dose was decreased.  2/2 - pt was more awake after decreasing Keppra dose further to 250mg BID. Pt able to answer simple questions appropriate and follow simple commands. SLP following but pt continues to fail swallow study. NGT placed. Pt started on tube feeds.     Interval hx:   Pt was seen and examined at bedside. Pt had no acute events overnight, and no new complaints this morning. Pt remained hemodynamically stable and afebrile.  Patient appears to be more and more awake every day.  Patient awake, alert, able to answer simple questions and follow simple commands.  He is oriented x3.  NG tube was replaced yesterday, tube feeds resumed.  Patient continues to work with SLP and has been progressing.  Patient denies any acute complaints    ROS (Positive in Bold, otherwise negative)  Constitutional: fever, chills, night sweats  CV: chest pain, edema, palpitations  Resp: SOB, cough, sputum production  GI: changes in appetite, NVDC, pain, melena, hematochezia, GERD, hematemesis  : Dysuria, hematuria, urinary urgency, frequency  MSK: arthralgia/myalgia, joint swelling  Neuro/Psych: anxiety, depression    PEx   Temp:  [96.9 °F (36.1 °C)-98.2 °F (36.8 °C)]   Pulse:  [80-97]   Resp:  [16-99]   BP: " ()/(35-82)   SpO2:  [96 %-100 %]      I & O (Last 24H):     Intake/Output Summary (Last 24 hours) at 2/5/2020 1403  Last data filed at 2/5/2020 1125  Gross per 24 hour   Intake 650 ml   Output 1650 ml   Net -1000 ml       General:  male  in no acute distress. Nontoxic. Resting in bed. Cooperative.  Awake, alert  HEENT: NCAT. PERRL. EOMI. Sclera Anicteric.  NG tube in place  CVS: RRR. Normal S1 S2. No murmurs  Pulm: CTAB. Normal respiratory effort. No wheezes, rhonchi, or crackles.  Abdomen: Soft. Non-distended. No tenderness to palpation. No rebound or guarding. +BS.  Extremities: No edema. No cyanosis. Full ROM.  Globally weak  Neuro: Alert, oriented x 4, Spont mvt of all extremities with no focal deficits noted. Globally weak.  Able to answer questions appropriately and follow simple commands.    Recent Results (from the past 24 hour(s))   Comprehensive metabolic panel    Collection Time: 02/05/20  5:32 AM   Result Value Ref Range    Sodium 137 136 - 145 mmol/L    Potassium 5.7 (H) 3.5 - 5.1 mmol/L    Chloride 96 95 - 110 mmol/L    CO2 20 (L) 23 - 29 mmol/L    Glucose 80 70 - 110 mg/dL    BUN, Bld 64 (H) 6 - 20 mg/dL    Creatinine 8.8 (H) 0.5 - 1.4 mg/dL    Calcium 10.0 8.7 - 10.5 mg/dL    Total Protein 9.7 (H) 6.0 - 8.4 g/dL    Albumin 2.8 (L) 3.5 - 5.2 g/dL    Total Bilirubin 0.4 0.1 - 1.0 mg/dL    Alkaline Phosphatase 236 (H) 55 - 135 U/L    AST 40 10 - 40 U/L    ALT 22 10 - 44 U/L    Anion Gap 21 (H) 8 - 16 mmol/L    eGFR if African American 7.0 (A) >60 mL/min/1.73 m^2    eGFR if non  6.1 (A) >60 mL/min/1.73 m^2   Magnesium    Collection Time: 02/05/20  5:32 AM   Result Value Ref Range    Magnesium 2.4 1.6 - 2.6 mg/dL   Phosphorus    Collection Time: 02/05/20  5:32 AM   Result Value Ref Range    Phosphorus 7.3 (H) 2.7 - 4.5 mg/dL   CBC auto differential    Collection Time: 02/05/20  5:32 AM   Result Value Ref Range    WBC 9.58 3.90 - 12.70 K/uL    RBC 4.22 (L) 4.60 - 6.20  M/uL    Hemoglobin 12.5 (L) 14.0 - 18.0 g/dL    Hematocrit 37.0 (L) 40.0 - 54.0 %    Mean Corpuscular Volume 88 82 - 98 fL    Mean Corpuscular Hemoglobin 29.6 27.0 - 31.0 pg    Mean Corpuscular Hemoglobin Conc 33.8 32.0 - 36.0 g/dL    RDW 16.3 (H) 11.5 - 14.5 %    Platelets 300 150 - 350 K/uL    MPV 11.6 9.2 - 12.9 fL    Immature Granulocytes CANCELED 0.0 - 0.5 %    Immature Grans (Abs) CANCELED 0.00 - 0.04 K/uL    nRBC 0 0 /100 WBC    Gran% 54.0 38.0 - 73.0 %    Lymph% 28.0 18.0 - 48.0 %    Mono% 17.0 (H) 4.0 - 15.0 %    Eosinophil% 1.0 0.0 - 8.0 %    Basophil% 0.0 0.0 - 1.9 %    Aniso Slight     Poly Occasional     Differential Method Manual        Recent Labs   Lab 02/01/20  1430 02/01/20  1855 02/01/20  2341 02/02/20  0523 02/02/20  1513 02/03/20  0812   POCTGLUCOSE 122* 79 145* 192* 104 109       Hemoglobin A1C   Date Value Ref Range Status   11/09/2019 4.0 4.0 - 5.6 % Final     Comment:     ADA Screening Guidelines:  5.7-6.4%  Consistent with prediabetes  >or=6.5%  Consistent with diabetes  High levels of fetal hemoglobin interfere with the HbA1C  assay. Heterozygous hemoglobin variants (HbS, HgC, etc)do  not significantly interfere with this assay.   However, presence of multiple variants may affect accuracy.     06/22/2019 4.7 4.0 - 5.6 % Final     Comment:     ADA Screening Guidelines:  5.7-6.4%  Consistent with prediabetes  >or=6.5%  Consistent with diabetes  High levels of fetal hemoglobin interfere with the HbA1C  assay. Heterozygous hemoglobin variants (HbS, HgC, etc)do  not significantly interfere with this assay.   However, presence of multiple variants may affect accuracy.     06/22/2019 4.7 4.0 - 5.6 % Final     Comment:     ADA Screening Guidelines:  5.7-6.4%  Consistent with prediabetes  >or=6.5%  Consistent with diabetes  High levels of fetal hemoglobin interfere with the HbA1C  assay. Heterozygous hemoglobin variants (HbS, HgC, etc)do  not significantly interfere with this assay.   However,  presence of multiple variants may affect accuracy.          Active Hospital Problems    Diagnosis  POA    *Seizure [R56.9]  Yes    ESRD on dialysis [N18.6, Z99.2]  Not Applicable    Aphasia [R47.01]  Clinically Undetermined    Pressure injury due to medical device [T85.898A, L89.90]  Yes    Altered mental status [R41.82]  Yes    Gastrointestinal hemorrhage with hematemesis [K92.0]  Yes    GERD with esophagitis [K21.0]  Yes    Severe malnutrition [E43]  Yes     Assessment and Plan  Severe Malnutrition in the context of Social/Environmental Circumstances     Related to (etiology):  Unknown etiology     Signs and Symptoms (as evidenced by):  Energy Intake: per pt, <75% intake over the last year  Body Fat Depletion: overall subcutaneous fat loss, moderate triceps fat loss and protruding patellas.  Muscle Mass Depletion:  moderate muscle wasting of interosseous, temporal, clavicle, calves and quadriceps  Weight Loss: 24% (39 lbs) x 1 year    Recommendations     Recommendation/Intervention: 1. Should pt be cleared for po diet, recommend renal diet with texture per SLP along with Novasource ONS TID. 2. Should pt require enteral feeding, initiate Novasource renal formula at 10ml/hr and advance 10ml Q4hrs to goal rate of 40ml/hr (provides 1920kcal, 87g pro, 691ml free water). Provide additional 500ml water flush/24 hrs. Hold for residuals >500ml.  Goals: 1. Pt to receive nutrition < 48 hrs.      Renovascular hypertension [I15.0]  Yes     Chronic    Chronic diastolic heart failure [I50.32]  Yes     Chronic     2-23-17    1 - Low normal to mildly depressed left ventricular systolic function (EF 50-55%).     2 - Right ventricular enlargement with mildly depressed systolic function.     3 - Left ventricular diastolic dysfunction.     4 - Right atrial enlargement.     5 - Severe tricuspid regurgitation.     6 - Pulmonary hypertension. The estimated PA systolic pressure is 86 mmHg.     7 - Increased central venous  pressure.       Encephalopathy [G93.40]  Yes      Resolved Hospital Problems    Diagnosis Date Resolved POA    Acute metabolic encephalopathy [G93.41] 01/23/2020 Yes    ESRD on hemodialysis [N18.6, Z99.2] 01/31/2020 Not Applicable     Chronic     MWF at [facility]            Assessment and Plan for problems addressed today:      sodium chloride 0.9%   Intravenous Once    albuterol sulfate  10 mg Nebulization Once    carvediloL  3.125 mg Per OG tube BID    Dextrose 10% Bolus  25 g Intravenous Once    And    insulin regular  0.1 Units/kg (Dosing Weight) Intravenous Once    heparin (porcine)  5,000 Units Subcutaneous Q8H    levetiracetam oral soln  250 mg Per G Tube BID    midodrine  5 mg Per NG tube Every Mon, Wed, Fri    pantoprozole (PROTONIX) IV  40 mg Intravenous Q12H    sevelamer carbonate  1.6 g Per OG tube TID    sodium polystyrene  30 g Oral Q4H     acetaminophen, albuterol-ipratropium, Dextrose 10% Bolus, Dextrose 10% Bolus, Dextrose 10% Bolus **AND** Dextrose 10% Bolus **AND** insulin regular, glucagon (human recombinant), glucose, glucose, ondansetron, polyethylene glycol, prochlorperazine, senna-docusate 8.6-50 mg, sodium chloride 0.9%    Acute encephalopathy:  Seizures:  -Patient initially presented to ED prior to getting dialysis due to AMS and brown emesis. Had witnessed tonic clonic seizure requiring ativan shortly after getting a CT head. He was loaded with 1500 mg Keppra, intubated for airway protection, and started on propofol for sedation. Upon physical exam, patient remained unresponsive to verbal or tactile stimuli and had upward left sided gaze with sluggish pupils. Concern for status epilepticus.    -UDS negative, alcohol level wnl  -Sepsis: Initial lactate 2.4, likely due to seizure event; repeat was 1.9. Patient received 500 ml saline in ED. Initial blood cultures, sputum culture+gram stain neg  Lumbar puncture done, CSF not convincing for meningitis, so ampicillin, vancomycin,  rocephin d/c'd on 01/22. However, pt was febrile on 11/23 to 101.3. Blood cultures repeated and restarted on Vanc and Zosyn. Repeat cultures NGTD. HSV PCR neg; Acyclovir discontinued. Pt has remained afebrile. Vanc and Zosyn discontinued 01/26.   -Intracranial: patient with history of ICH with no functional deficits. Possibly multiple foci for seizure overtime. CT without acute changes, MRI brain with chronic changes and hypertensive angiopathy; no acute changes.  - PRES: Patient off cardene drip. MRI reviewed. Will follow blood pressure as patient is normally hypotensive.   -Spot EEG without evidence of current seizure. However patient already received keppra, ativan, and sedated on propofol. 24 hour EEG with L sided structural lesion and underlying epileptiform potential. Initiated 1500 mg keppra on 01/22, per Neurology recs.  -Neurology consulted. repeat EEG on 01/27 shows diffuse slowing, though no focal activity seen on EEG.   -01/28 patient's mental status is continuing to improve day to day, now following commands. 01/30 Patient is alert and following commands. Passed SBT and extubated. Breathing well though with generalized weakness as well as weak cough.   -Mental status continues to improve daily   -continue keppra 250mg BID per neuro recs   -continue aspiration precautions, fall precautions, seizure precaution  -neuro checks  -work with OT/PT/SLP      Oropharyngeal dysphagia:  -likely secondary to acute encephalopathy  -SLP consulted, recommending to keep patient NPO for now  -NG tube placed, continue tube feeds  -aspiration precaution  -continue to work with SLP, advance diet as tolerated     Renovascular hypertension  -Patient on coreg at nursing home; has been compliant as given by nursing home. Patient with hypertensive emergency on admit and started Cardene drip to titrate to BP of 160-180. Off Cardene drip since 01/20.   -Goal SBP < 160 per Neurology.   -Continue home carvedilol 3.125 mg BID    ESRD  on HD:  -Pt undergoes HD MWF outpatient  -Consult nephrology to continue HD inpatient   -Renally dose medications and avoid nephrotoxic medications to preserve residual renal function   -Strict I/O's and daily weights  -Continue to monitor renal function with daily labs     Chronic diastolic heart failure  -Last echo done at [facility] 8/2/19, EF>55%, bi-atrial enlargement  -No signes of exacerbation   -Continue to monitor on telemetry  -Monitor intake and output and obtain daily STANDING weights  -Fluid restriction to 1.5L per day and cardiac diet with salt restriction  -Supplemental O2 to keep Spo2 >90%    Severe malnutrition:  -Nutrition consulted  -Novasource @ 44 mL/hr to provide 1440 kcals, 65 g of protein, 516 mL fluid.   -Continue TFs     Gastrointestinal hemorrhage with hematemesis:  -Patient with reports of coffee ground emesis prior to transport to ED. In ED, dark brown contents suctioned from stomach. Hemoglobin remains stable and pt does not have any HD instability. Patient is well known to GI. His most recent scope was in November that just showed esophagitis similar to his previous EGD.   -GI consulted and appreciate recommendations. No scope indicated at this time  -continue protonix  -continue to monitor H&H daily  -transfuse for hemoglobin under 7  -Nursing / Phlebotomy to use pediatric tubes when drawing labs to minimize iatrogenic anemia and blood loss.      GERD with esophagitis  -Continue Protonix  -Follow up with GI out patient      Discharge plan and follow up: DC to SNF once medically stable     Mariposa Dalton MD  [facility] Medicine Staff  [phone] pager

## 2020-02-05 NOTE — PROGRESS NOTES
Report received from ISMAEL Tabor. Pt arrived from floor via bed. Maintenance dialysis initiated via RICHARD fistula with 15G needles BFR@ 400.

## 2020-02-05 NOTE — PROGRESS NOTES
3.5hr HD treatment completed 1L of fluid removed pt tolerated well. Both needles of a RICHARD fistula removed and pressure held until hemostasis achieved dressing applied. Report given to primary nurse.

## 2020-02-05 NOTE — PT/OT/SLP PROGRESS
Physical Therapy      Patient Name:  Vaughn Retana   MRN:  [id number]    Patient not seen today secondary to Dialysis. Unable to follow up in PM due to PT scheduling. Will follow-up as appropriate.    Ina Keen, PT

## 2020-02-05 NOTE — PT/OT/SLP PROGRESS
"Speech Language Pathology Treatment    Patient Name:  Vaughn Retana   MRN:  [id number]   955/955 A    Admitting Diagnosis: Seizure    Recommendations:                 General Recommendations:  Dysphagia therapy  Diet recommendations:  NPO, Liquid Diet Level: NPO   Aspiration Precautions: Strict aspiration precautions   General Precautions: Standard, aspiration, fall, NPO  Communication strategies:  go to room if call light pushed    Subjective     "Go!" Pt completed BOT /g/ exercise x5.     Pain/Comfort:  · Pain Rating 1: 0/10  · Pain Rating Post-Intervention 1: 0/10    Objective:     Has the patient been evaluated by SLP for swallowing?   Yes  Keep patient NPO? Yes   Current Respiratory Status: room air      Pt awake upon entry. Repositioned and HOB raised. Dec'd oral secretion management observed, as pt noted to cough on secretions as well as with severely wet vocal quality, concerning for inc'd risk for aspiration. Therefore PO trials deferred. Dysphagia exercises introduced this session. Despite repetitive verbal and tactile cueing provided, attempt to trigger volitional swallow for completion of effortful swallow exercise unappreciated across multiple trials. Therefore abdiel and supraglottic exercises deferred. Although pt completed BOT /g/ exercise x5, across multiple trials for which model was provided he eventually completed BOT /k/ exercise x2. Dec'd vocal strength concerning for dec'd efficacy of BOT exercises. Education provided re: ongoing SLP recommendation for NPO as well as ongoing SLP POC. However indication of pt's understanding unappreciated. No further questions.     Assessment:     Vaughn Retana is a 54 y.o. male with an SLP diagnosis of dysphagia.     Goals:   Multidisciplinary Problems     SLP Goals        Problem: SLP Goal    Goal Priority Disciplines Outcome   SLP Goal     SLP Ongoing, Progressing   Description:  Speech Language Pathology  Goals expected to be met by 2/7:  1. Pt will " participate in ongoing assessment of swallow to determine safest, least restrictive diet.                   Plan:     · Patient to be seen:  4 x/week   · Plan of Care expires:  02/29/20  · Plan of Care reviewed with:  patient   · SLP Follow-Up:  Yes       Discharge recommendations:  nursing facility, skilled     Time Tracking:     SLP Treatment Date:   02/05/20  Speech Start Time:  1346  Speech Stop Time:  1357     Speech Total Time (min):  11 min    Billable Minutes: Treatment Swallowing Dysfunction 11    NALDO Holden, CCC-SLP  [phone]  2/5/2020

## 2020-02-05 NOTE — PLAN OF CARE
POC reviewed with patient. Patient verbalized understanding. VSS. No complaints or acute events during shift. NG tube in place. Restraints assessed. Safety measures maintained. Will continue to monitor.

## 2020-02-06 NOTE — PLAN OF CARE
02/06/20 1507   Discharge Reassessment   Assessment Type Discharge Planning Reassessment   Provided patient/caregiver education on the expected discharge date and the discharge plan Yes   Do you have any problems affording any of your prescribed medications? No   Discharge Plan A Skilled Nursing Facility   Discharge Plan B Return to Nursing Home   DME Needed Upon Discharge  none

## 2020-02-06 NOTE — PLAN OF CARE
Recommend ongoing NPO. Severely dec'd oral secretion management observed during SLP sessions provided 2/5/20 and 2/6/20 concerning for acute medical/ neurological change.     NALDO Holden, CCC-SLP  [phone]  2/6/2020

## 2020-02-06 NOTE — PT/OT/SLP PROGRESS
Speech Language Pathology Treatment    Patient Name:  Vaughn Retana   MRN:  [id number]   955/955 A    Admitting Diagnosis: Seizure    Recommendations:                 General Recommendations:  Dysphagia therapy  Diet recommendations:  NPO, Liquid Diet Level: NPO   Aspiration Precautions: Strict aspiration precautions   General Precautions: Standard, aspiration, fall, NPO  Communication strategies:  go to room if call light pushed    Subjective     Verbalizations unappreciated this session.     Pain/Comfort:  · Pain Rating 1: 0/10  · Pain Rating Post-Intervention 1: 0/10    Objective:     Has the patient been evaluated by SLP for swallowing?   Yes  Keep patient NPO? Yes   Current Respiratory Status: room air      Although pt awake upon entry, appeared drowsy throughout session. Niece present at bedside. HOB raised. Severely dec'd oral secretion management characterized by pooling of secretions with saliva bubbles visualized across labial surface, as well as pt noted to intermittently cough/ choke on his secretions, concerning for inc'd risk for aspiration. Oral suctioning via yankauer used to suction secretions. However only fleetingly beneficial then resumed pooling and coughing/ choking noted. Therefore PO trials deferred. Despite repetitive model and verbal and tactile cueing provided, attempt to initiate swallow for completion of effortful swallow and BOT dysphagia exercises unappreciated across multiple trials. Education provided to pt and niece re: role of SLP, SLP recommendation for NPO, concern for potential need for longer term alternate source nutrition, hydration, medication given recommendation for NPO warranted since Clinical Evaluation of Swallow completed 1/31/20, and ongoing SLP POC. Although indication of pt's understanding unappreciated, pt's niece verbalized understanding of education provided and agreement with SLP POC. No further questions.     Results discussed with NSG, including concern for  acute medical/ neurological change given adequate oral secretion management appreciated 2/4/20. NSG verbalized understanding of information provided.     Assessment:     Vaughn Retana is a 54 y.o. male with an SLP diagnosis of dysphagia.     Goals:   Multidisciplinary Problems     SLP Goals        Problem: SLP Goal    Goal Priority Disciplines Outcome   SLP Goal     SLP Ongoing, Progressing   Description:  Speech Language Pathology  Goals expected to be met by 2/7:  1. Pt will participate in ongoing assessment of swallow to determine safest, least restrictive diet.                   Plan:     · Patient to be seen:  4 x/week   · Plan of Care expires:  02/29/20  · Plan of Care reviewed with:  patient, family   · SLP Follow-Up:  Yes       Discharge recommendations:  nursing facility, skilled     Time Tracking:     SLP Treatment Date:   02/06/20  Speech Start Time:  0914  Speech Stop Time:  0926     Speech Total Time (min):  12 min    Billable Minutes: Treatment Swallowing Dysfunction 12    NALDO Holden, CCC-SLP  [phone]  2/6/2020

## 2020-02-06 NOTE — PLAN OF CARE
Problem: Adult Inpatient Plan of Care  Goal: Plan of Care Review  Outcome: Ongoing, Not Progressing        POC reviewed with patient this shift.  Pt drowsy but responsive to verbal stimuli.  Oriented x4.  Respirations unlabored.  Skin w/d.  NGT to left nare continues with Novasource at goal of 44mL/h as ordered.  No residual noted.  CBG monitoring continues with no s/s of hypo/hyperglycemia.  Viscous oral secretions continue with suctioning completed Q4/PRN.  Pt did c/o back pain at beginning of shift with PRN Tylenol given.  No further complaints voiced.  Rt A/V fistula continues with dressing CDI.  VSS.  No s/s of distress noted.  WCTM.

## 2020-02-06 NOTE — PT/OT/SLP PROGRESS
Occupational Therapy      Patient Name:  Vaughn Retana   MRN:  [id number]    Pt not seen on this date; Chart reviewed and pt remain appropriate for OT services at this time.  Will see pt as schedule allows.      Wilton Major, OT  2/6/2020

## 2020-02-06 NOTE — PT/OT/SLP PROGRESS
Physical Therapy      Patient Name:  Vaughn Retana   MRN:  [id number]  Admitting Diagnosis:  Seizure   Recent Surgery: * No surgery found *    Admit Date: 1/20/2020  Length of Stay: 17 days    Vaughn Retana's plan of care and PT goals reviewed on this date and remain appropriate. Will follow-up for progressive mobility per PT POC, pt availability, and as medically appropriate.     Lauren Issa, PT, DPT  2/6/2020

## 2020-02-07 NOTE — PROGRESS NOTES
Unable to see for skin concerns as he is off the floor in dialysis. Will reschedule to see in the afternoon.   Live Lujan RN CWON  [phone]

## 2020-02-07 NOTE — PROGRESS NOTES
HD initiated, cannulated upper right arm avf  With 15 gauge needles , good  Blood flow noted. /. Net uf set for 1 liters as tolerated. , B/P 104/53 pulse 96 . Patient alert.

## 2020-02-07 NOTE — PROGRESS NOTES
Follow up for mucosal membrane device related pressure injury.   Right nare pressure injury has resolved, sacral area and other bony prominences remain clear.   Left ear with pressure concerns  Noted some purple discolorations to pinna of the ear. Upon cleansing the purple tissue lifted to reveal intact skin.           Live Lujan RN CWON  [id number]

## 2020-02-07 NOTE — PROGRESS NOTES
[facility] NOTE    Vaughn Retana is a 54 y.o. male currently on hemodialysis for removal of uremic toxins and volume.     Patient seen and evaluated on hemodialysis, tolerating treatment, see HD flowsheet for vitals and assessments.    No Hypotension, chest pain, shortness of breath, cramping, nausea or vomiting.      Labs have been reviewed and the dialysate bath has been adjusted.    Labs:      Recent Labs   Lab 02/05/20  0532 02/06/20  0310 02/07/20  0408    140 140   K 5.7* 3.8 3.9   CL 96 95 95   CO2 20* 26 25   BUN 64* 38* 68*   CREATININE 8.8* 6.1* 8.1*   CALCIUM 10.0 10.7* 11.0*   PHOS 7.3* 6.0* 5.2*       Recent Labs   Lab 02/04/20  0830 02/05/20  0532 02/06/20  0310   WBC 6.55 9.58 7.50   HGB 12.3* 12.5* 12.9*   HCT 41.3 37.0* 41.1    300 322        Assessment/Plan    Ultrafiltration goal: 1 L as tolerated, keep MAP >65.  - Seen on dialysis this morning, tolerating session with current UFR, no complications.    - Patient more alert during treatment, nodding appropriately during assessment.   - No lab stick/BP intake on access site  - Will continue dialysis treatments while in-patient  - Continue to monitor intake and output, daily weights   - Avoid nephrotoxic medication and renal dose medications to GFR    Anemia of ESRD  - Hgb 12.9, within range     BMM  - Renal diet with protein intake goal 1.5 g/kg/d  - Novasource with meals  - Phos 5.2, on binders  - Daily renal function panel     Glenis Benavidez, ADILIA, APRN, FNP-C  Nephrology Department  Pager:  [phone]

## 2020-02-07 NOTE — PLAN OF CARE
Pt is A, A, Ox4. Calm, cooperative. Free of falls, trauma, and injuries. Skin intact ex small wound to R nare, wound care per orders. Pt educated on treatment plan. Pt demonstrates and verbalizes understanding. VSS. NGT replaced after pt pulled and verified by Xray. TF restarted at 10cc/hr. Pt at HD. BG monitored Q6H. 2 pt restraints in place. Plan of care reviewed with pt.

## 2020-02-07 NOTE — PT/OT/SLP PROGRESS
Occupational Therapy      Patient Name:  Vaughn Retana   MRN:  [id number]    Patient not seen today secondary to Nursing care(Pt recieving wound care). Will follow-up 2/10/2020.    Wilton Major, OT  2/7/2020

## 2020-02-07 NOTE — NURSING
Pt noted to have x1 episode of small amount of emesis which looked like TF noted upon entry to room.  Upon assessment 450ml of residual noted.  ABD soft/non-tender/non-distended.  BS hypoactive.  Oral suctioning completed.  Call placed to on-call MD who notes to hold TF x2 hours then resume TF at 10ml/hr then advance in increments until goal reached as per unit protocol.

## 2020-02-07 NOTE — PROGRESS NOTES
HD completed, no fluid was removed  Due to b/P not tolerating any uf. Patient had a + of 250ml  After blood returned. Needles pulled and post bleeding time <  5 minutes. , post B/P 120/66., pulse 97.

## 2020-02-07 NOTE — PT/OT/SLP PROGRESS
Physical Therapy Treatment    Patient Name:  Vaughn Retana   MRN:  [id number]  Admitting Diagnosis: Seizure  Recent Surgery: * No surgery found *      Recommendations:     Discharge Recommendations:  nursing facility, skilled(in NH vs return to long-term in NH)   Discharge Equipment Recommendations: (TBD)   Barriers to discharge: None    Plan:     During this hospitalization, patient to be seen 3 x/week to address the above listed problems via therapeutic activities, therapeutic exercises, neuromuscular re-education  · Plan of Care Expires:  02/28/20   Plan of Care Reviewed with: patient    This Plan of care has been discussed with the patient who was involved in its development and understands and is in agreement with the identified goals and treatment plan    Subjective     Communicated with RN (Caro) prior to session.     Patient comments: Pt non-verbal during session, however, he communicates via head nods  Pain/Comfort:  · Pain Rating 1: 0/10  · Pain Rating Post-Intervention 1: 0/10    Objective:     2 attempts for tx session 2* pt JANET away for HD at 9:13 am    Patient found with: bed alarm, NG tube, peripheral IV, restraints(waffle pad)    Patient found R side lying in bed upon PT entry to room, agreeable to treatment.  NO family present in the room.    General Precautions: Standard, aspiration, fall, NPO   Orthopedic Precautions:N/A   Braces: N/A       BED MOBILITY (vc's for hand placement sequencing of task):        Rolling to the R:  Max A.       Rolling to the L:  Max A.        Sup > sit at the EOB:  Max/total A for trunk elevation and LE's.       Sit > sup:  Max A for trunk and LE's.       Scooting hips to EOB with max/total A       Scooting hips along the EOB to the R requiring total A x3 scoots                SITTING AT THE EDGE OF THE BED (15 min)   Assistance Level Required: initially with max A then mod A for trunk/head control with B UE support   Postural deviations noted: flexed trunk, forward  head, flexed c-spine, PPT, rounded shoulders   Encouraged: trunk ext, APT, midline orientation, B feet in contact with the floor        TRANSFERS  (vc's for hand placement, sequencing of task and safety)   Patient completed Sit <> Stand Transfer from EOB with total A of 1 for hip ext with no assistive device x2 trial(s) Pt does not achieve full erect posture   Patient completed Stand <> Sit Transfer to EOB with max A for controlled descent with no assistive device     THERAPEUTIC ACTIVITIES/EXERCISES  Pt receives PROM to R LE and L residual limb sup in bed x10 reps     EDUCATION  Patient provided with daily orientation and goals of this PT session. They were educated to call for assistance and to transfer with hospital staff only.  Also, pt was educated on the effects of prolonged immobility and the importance of performing OOB activity and exercises to promote healing and reduce recovery time      Whiteboard updated with correct mobility information. RN/PCT notified.  Pt requires max A to sit at the EOB.    Patient left left sidelying, with  all lines intact, call button in reach, bed alarm on, restraints reapplied at end of session and RN notified    AM-PAC 6 CLICK MOBILITY  Turning over in bed (including adjusting bedclothes, sheets and blankets)?: 2  Sitting down on and standing up from a chair with arms (e.g., wheelchair, bedside commode, etc.): 2  Moving from lying on back to sitting on the side of the bed?: 2  Moving to and from a bed to a chair (including a wheelchair)?: 1  Need to walk in hospital room?: 1  Climbing 3-5 steps with a railing?: 1  Basic Mobility Total Score: 9     Assessment:     Vaughn Retana is a 54 y.o. male admitted with a medical diagnosis of Seizure.  He presents with the following impairments/functional limitations:  weakness, impaired endurance, impaired sensation, impaired self care skills, impaired functional mobilty, impaired balance, impaired cognition, decreased coordination,  decreased upper extremity function, decreased lower extremity function, decreased safety awareness, decreased ROM, impaired coordination, impaired fine motor, impaired skin. requiring significant assistance and verbal cues for bed mob, scooting to/along the EOB, static sitting at the EOB, sit < > stand 2* weakness, poor trunk control, cognitive deficits.   In light of pt's current functional level and deficits, it is anticipated that pt will need to participate in an intense rehab program consisting of PT and OT in order to achieve full rehab potential to return to previous level of function and roles.  Pt will cont to benefit from skilled PT intervention to address deficits and improve functional mobility.     Rehab Prognosis:  fair; patient would benefit from acute skilled PT services to address these deficits and reach maximum level of function.      GOALS:   Multidisciplinary Problems     Physical Therapy Goals        Problem: Physical Therapy Goal    Goal Priority Disciplines Outcome Goal Variances Interventions   Physical Therapy Goal     PT, PT/OT Ongoing, Progressing     Description:  Goals to be met by: 2020    Patient will increase functional independence with mobility by performin. Supine to sit with Moderate Assistance - not met  2. Rolling to Left with Moderate Assistance. - not met  3. Rolling to Right with Moderate Assistance - not met  4. Sit to stand transfer with Moderate Assistance - not met  5. Sitting at edge of bed x8 minutes with Contact Guard Assistance - not met  6. Lower extremity exercise program x15 reps per handout, with assistance as needed - not met                      Time Tracking:     PT Received On: 20  PT Start Time: 1544     PT Stop Time: 1611  PT Total Time (min): 27 min     Billable Minutes: Therapeutic Activity 15 and Therapeutic Exercise 12    Treatment Type: Treatment  PT/PTA: PTA     PTA Visit Number: 1       Ann Marie Klein PTA.  Pager  [phone]    2/7/2020    .

## 2020-02-07 NOTE — NURSING TRANSFER
Nursing Transfer Note      2/7/2020     Transfer To: Dialysis    Transfer via bed    Transfer with tube feed    Transported by transport tech    Medicines sent: no    Chart send with patient: Yes

## 2020-02-07 NOTE — PLAN OF CARE
Problem: Adult Inpatient Plan of Care  Goal: Plan of Care Review  Outcome: Ongoing, Not Progressing    POC reviewed with patient. Patient verbalized understanding. VSS. No complaints of pain. Pt lethargic and slept most of the day. Provider notified.  Less secretions today , frequent suctioning and oral care done. All questions and concerns addressed. Fall and safety measures maintained throughout shift. Will continue to monitor.

## 2020-02-07 NOTE — PT/OT/SLP PROGRESS
Speech Language Pathology      Vaughn Retana   955/955 A    MRN: [id number]    Patient not seen today secondary to Unavailable (Comment)(NSG reported pt JANET for HD upon SLP attempt to treat at 1043. ). Will follow-up according to SLP POC if pt medically stable and available.     NALDO Holden, CCC-SLP  [phone]  2/7/2020

## 2020-02-07 NOTE — PROGRESS NOTES
Hospital Medicine   Progress note      [place]: [facility] [place] Jaskaran Colon MD   Admit Date: 1/20/2020   Hospital Day: 18  JUAN: 2/11/2020   Code status: Full Code   Principal Problem: Seizure     Summary: Patient is a 54 year old male with extensive medical history including ESRD on dialysis MWF (has not received it today), L below knee amputation 2/2 osteomyelitis, dCHF (last echo 8/2/19 [facility] [place]), GERD, h/o multiple ICH w/o residual deficits, and multiple instances of GI bleed (last EGD 11/12/19, esophagitis) who presents to ED [facility] nursing home due to altered mental status. From NH report, nursing home staff went to wake the patient for his morning dialysis. He was confused, lethargic, had a moderate amount of brown colored emesis in his trash can. Patient last got dialysis on Friday. Patient is normally able to ambulate on his own and is alert and oriented; nursing staff reports that he appeared normal day before admission. Patient admitted to critical care with concerns of new onset seizures and s/p intubation for airway protection. Patient was started on vanc, rocephin, ampicillin, and acyclovir to cover for meningitis. Patient had spot EEG while on propofol in ED which did not show seizure activity. MRI done showed chronic changes but no acute abnormalities. Lumbar puncture was done after MRI. CSF labs were not convincing for bacterial or viral meningitis and antibiotics were weaned down. Continuous EEG was done after propofol was stopped which showed epileptiform discharges. Per epilepsy, patient was given another dose of Keppra. Nephrology consulted and started HD. Pt became febrile the night of 01/22; blood cultures repeated and restarted on Vanc and Zosyn. HSV PCR neg so Acyclovir discontinued.  Neurology was consulted and pt was continued on Keppra but dose was decreased. 01/27 Patient's neuro exam is somewhat improved from day prior per nurse - opening eyes spontaneously and occasionally tracking  "movements, blinking to threat. 01/28 passed SBT today, plan to extubate 01/29. Repeat 24 hr EEG shows "multifocal slowing consistent with multiple areas of focal cortical dysfunction and occasional epileptiform discharges in the left frontotemporal region consistent with a potential seizure focus in this area" with no electrographic seizures. 01/29 Originally planned for extubation, but was deferred in light of BRBPR ~08:30 per nurse. GI consulted, no scope indicated at this time. Otherwise mental status improving in small increments daily. 01/30 Patient successfully extubated, though very weak. Unable to clear secretions effectively at this time, weak cough. 2/1- lethargic, EEG was done with no seizure activity, Keppra dose was decreased.  2/2 - pt was more awake after decreasing Keppra dose further to 250mg BID. Pt able to answer simple questions appropriate and follow simple commands. SLP following but pt continues to fail swallow study. NGT placed. Pt started on tube feeds and tolerating well. He continues to be lethargic at this time.    Interval hx:   Pt was seen and examined at bedside. Patient pulled out his NGT overnight, and another NGT was placed. CXR reviewed this AM, and NGT in left lung. Called RN and removed NGT. Another NGT was placed this AM, with good results. As per chart review he was apparently agitated overnight night, then eventually pulled it out due to discomfort. This morning patient appears stable and continues to be lethargic. No new complaints. Soft BP this AM. Able to answer questions, but very slow. HD today, was nauseated and lethargic post HD. SLP following but unable to see today as patient was in HD. Continue TF for now.       ROS (Positive in Bold, otherwise negative)  Constitutional: fever, chills, night sweats. Generalized weakness   CV: chest pain, edema, palpitations  Resp: SOB, cough, sputum production  GI: dysphagia, changes in appetite, NVDC, pain, melena, hematochezia, " GERD, hematemesis  : Dysuria, hematuria, urinary urgency, frequency  MSK: arthralgia/myalgia, joint swelling  Neuro/Psych: anxiety, depression, seizure     PEx   Temp:  [98.1 °F (36.7 °C)-99.7 °F (37.6 °C)]   Pulse:  []   Resp:  [14-18]   BP: ()/(35-68)   SpO2:  [95 %-100 %]      I & O (Last 24H):     Intake/Output Summary (Last 24 hours) at 2/7/2020 1732  Last data filed at 2/7/2020 1300  Gross per 24 hour   Intake 600 ml   Output 400 ml   Net 200 ml       General:  male  in no acute distress. Appears chronically ill. Resting in bed. Cooperative.  Aox4  HEENT: NCAT. PERRL. EOMI. Sclera Anicteric.  NG tube in place  CVS: RRR. Normal S1 S2. No murmurs  Pulm: CTAB. Normal respiratory effort. No wheezes, rhonchi, or crackles.  Abdomen: Soft. Non-distended. No tenderness to palpation. No rebound or guarding. +BS.  Extremities: No edema. No cyanosis. Full ROM.  Globally weak  Neuro: Alert, oriented x 4, Spont mvt of all extremities with no focal deficits noted. Globally weak.  Able to answer questions appropriately and follow simple commands.    Recent Results (from the past 24 hour(s))   POCT glucose    Collection Time: 02/06/20  6:40 PM   Result Value Ref Range    POCT Glucose 155 (H) 70 - 110 mg/dL   POCT glucose    Collection Time: 02/06/20  9:07 PM   Result Value Ref Range    POCT Glucose 106 70 - 110 mg/dL   Magnesium    Collection Time: 02/07/20  4:08 AM   Result Value Ref Range    Magnesium 2.5 1.6 - 2.6 mg/dL   Renal function panel    Collection Time: 02/07/20  4:08 AM   Result Value Ref Range    Glucose 162 (H) 70 - 110 mg/dL    Sodium 140 136 - 145 mmol/L    Potassium 3.9 3.5 - 5.1 mmol/L    Chloride 95 95 - 110 mmol/L    CO2 25 23 - 29 mmol/L    BUN, Bld 68 (H) 6 - 20 mg/dL    Calcium 11.0 (H) 8.7 - 10.5 mg/dL    Creatinine 8.1 (H) 0.5 - 1.4 mg/dL    Albumin 3.0 (L) 3.5 - 5.2 g/dL    Phosphorus 5.2 (H) 2.7 - 4.5 mg/dL    eGFR if African American 7.8 (A) >60 mL/min/1.73 m^2     eGFR if non  6.7 (A) >60 mL/min/1.73 m^2    Anion Gap 20 (H) 8 - 16 mmol/L   POCT glucose    Collection Time: 02/07/20  4:55 AM   Result Value Ref Range    POCT Glucose 175 (H) 70 - 110 mg/dL   POCT glucose    Collection Time: 02/07/20  1:26 PM   Result Value Ref Range    POCT Glucose 154 (H) 70 - 110 mg/dL   POCT glucose    Collection Time: 02/07/20  5:14 PM   Result Value Ref Range    POCT Glucose 164 (H) 70 - 110 mg/dL       Recent Labs   Lab 02/06/20  1125 02/06/20  1840 02/06/20  2107 02/07/20  0455 02/07/20  1326 02/07/20  1714   POCTGLUCOSE 176* 155* 106 175* 154* 164*       Hemoglobin A1C   Date Value Ref Range Status   02/06/2020 4.7 4.0 - 5.6 % Final     Comment:     ADA Screening Guidelines:  5.7-6.4%  Consistent with prediabetes  >or=6.5%  Consistent with diabetes  High levels of fetal hemoglobin interfere with the HbA1C  assay. Heterozygous hemoglobin variants (HbS, HgC, etc)do  not significantly interfere with this assay.   However, presence of multiple variants may affect accuracy.     11/09/2019 4.0 4.0 - 5.6 % Final     Comment:     ADA Screening Guidelines:  5.7-6.4%  Consistent with prediabetes  >or=6.5%  Consistent with diabetes  High levels of fetal hemoglobin interfere with the HbA1C  assay. Heterozygous hemoglobin variants (HbS, HgC, etc)do  not significantly interfere with this assay.   However, presence of multiple variants may affect accuracy.     06/22/2019 4.7 4.0 - 5.6 % Final     Comment:     ADA Screening Guidelines:  5.7-6.4%  Consistent with prediabetes  >or=6.5%  Consistent with diabetes  High levels of fetal hemoglobin interfere with the HbA1C  assay. Heterozygous hemoglobin variants (HbS, HgC, etc)do  not significantly interfere with this assay.   However, presence of multiple variants may affect accuracy.     06/22/2019 4.7 4.0 - 5.6 % Final     Comment:     ADA Screening Guidelines:  5.7-6.4%  Consistent with prediabetes  >or=6.5%  Consistent with diabetes  High  levels of fetal hemoglobin interfere with the HbA1C  assay. Heterozygous hemoglobin variants (HbS, HgC, etc)do  not significantly interfere with this assay.   However, presence of multiple variants may affect accuracy.          Active Hospital Problems    Diagnosis  POA    *Seizure [R56.9]  Yes    ESRD on dialysis [N18.6, Z99.2]  Not Applicable    Aphasia [R47.01]  Clinically Undetermined    Pressure injury due to medical device [T85.898A, L89.90]  Yes    Altered mental status [R41.82]  Yes    Gastrointestinal hemorrhage with hematemesis [K92.0]  Yes    GERD with esophagitis [K21.0]  Yes    Severe malnutrition [E43]  Yes     Assessment and Plan  Severe Malnutrition in the context of Social/Environmental Circumstances     Related to (etiology):  Unknown etiology     Signs and Symptoms (as evidenced by):  Energy Intake: per pt, <75% intake over the last year  Body Fat Depletion: overall subcutaneous fat loss, moderate triceps fat loss and protruding patellas.  Muscle Mass Depletion:  moderate muscle wasting of interosseous, temporal, clavicle, calves and quadriceps  Weight Loss: 24% (39 lbs) x 1 year    Recommendations     Recommendation/Intervention: 1. Should pt be cleared for po diet, recommend renal diet with texture per SLP along with Novasource ONS TID. 2. Should pt require enteral feeding, initiate Novasource renal formula at 10ml/hr and advance 10ml Q4hrs to goal rate of 40ml/hr (provides 1920kcal, 87g pro, 691ml free water). Provide additional 500ml water flush/24 hrs. Hold for residuals >500ml.  Goals: 1. Pt to receive nutrition < 48 hrs.      Renovascular hypertension [I15.0]  Yes     Chronic    Chronic diastolic heart failure [I50.32]  Yes     Chronic     2-23-17    1 - Low normal to mildly depressed left ventricular systolic function (EF 50-55%).     2 - Right ventricular enlargement with mildly depressed systolic function.     3 - Left ventricular diastolic dysfunction.     4 - Right atrial  enlargement.     5 - Severe tricuspid regurgitation.     6 - Pulmonary hypertension. The estimated PA systolic pressure is 86 mmHg.     7 - Increased central venous pressure.       Encephalopathy [G93.40]  Yes      Resolved Hospital Problems    Diagnosis Date Resolved POA    Acute metabolic encephalopathy [G93.41] 01/23/2020 Yes    ESRD on hemodialysis [N18.6, Z99.2] 01/31/2020 Not Applicable     Chronic     MWF at [facility]            Assessment and Plan for problems addressed today:      sodium chloride 0.9%   Intravenous Once    heparin (porcine)  5,000 Units Subcutaneous Q8H    levetiracetam oral soln  250 mg Per G Tube BID    midodrine  5 mg Per NG tube Every Mon, Wed, Fri    pantoprozole (PROTONIX) IV  40 mg Intravenous Q12H    sevelamer carbonate  1.6 g Per OG tube TID     acetaminophen, albuterol-ipratropium, Dextrose 10% Bolus, Dextrose 10% Bolus, glucagon (human recombinant), glucose, glucose, insulin aspart U-100, ondansetron, polyethylene glycol, prochlorperazine, senna-docusate 8.6-50 mg, sodium chloride 0.9%    Acute encephalopathy: resolved  Seizures:  -Patient initially presented to ED prior to getting dialysis due to AMS and brown emesis. Had witnessed tonic clonic seizure requiring ativan shortly after getting a CT head. He was loaded with 1500 mg Keppra, intubated for airway protection, and started on propofol for sedation. Upon physical exam, patient remained unresponsive to verbal or tactile stimuli and had upward left sided gaze with sluggish pupils. Concern for status epilepticus.    -UDS negative, alcohol level wnl  -Sepsis: Initial lactate 2.4, likely due to seizure event; repeat was 1.9. Patient received 500 ml saline in ED. Initial blood cultures, sputum culture+gram stain neg  Lumbar puncture done, CSF not convincing for meningitis, so ampicillin, vancomycin, rocephin d/c'd on 01/22. However, pt was febrile on 11/23 to 101.3. Blood cultures repeated and restarted on Vanc and  Zosyn. Repeat cultures NGTD. HSV PCR neg; Acyclovir discontinued. Pt has remained afebrile. Vanc and Zosyn discontinued 01/26.   -Intracranial: patient with history of ICH with no functional deficits. Possibly multiple foci for seizure overtime. CT without acute changes, MRI brain with chronic changes and hypertensive angiopathy; no acute changes.  - PRES: Patient off cardene drip. MRI reviewed. Will follow blood pressure as patient is normally hypotensive.   -Spot EEG without evidence of current seizure. However patient already received keppra, ativan, and sedated on propofol. 24 hour EEG with L sided structural lesion and underlying epileptiform potential. Initiated 1500 mg keppra on 01/22, per Neurology recs.  -Neurology consulted. repeat EEG on 01/27 shows diffuse slowing, though no focal activity seen on EEG.   -01/28 patient's mental status is continuing to improve day to day, now following commands. 01/30 Patient is alert and following commands. Passed SBT and extubated. Breathing well though with generalized weakness as well as weak cough.   -Mental status continues to improve gradually daily   -continue keppra 250mg BID per neuro recs   -continue aspiration precautions, fall precautions, seizure precaution  -neuro checks  -work with OT/PT/SLP      Oropharyngeal dysphagia:  -likely secondary to acute encephalopathy  -SLP consulted, recommending to keep patient NPO for now  -NG tube placed, continue tube feeds, nutrition consulted  -aspiration precaution  -continue to work with SLP, advance diet as tolerated  -if unable to wean off TF may need PEG which patient is agreeable to      Renovascular hypertension  -Patient on coreg at nursing home; has been compliant as given by nursing home. Patient with hypertensive emergency on admit and started Cardene drip to titrate to BP of 160-180. Off Cardene drip since 01/20.   -Goal SBP < 160 per Neurology.   -Due to soft BP, dc home carvedilol 3.125 mg BID for  now    ESRD on HD:  -Pt undergoes HD MWF outpatient  -Consult nephrology to continue HD inpatient   -Renally dose medications and avoid nephrotoxic medications to preserve residual renal function   -Strict I/O's and daily weights  -Continue to monitor renal function with daily labs     Chronic diastolic heart failure  -Last echo done at [facility] [place] 8/2/19, EF>55%, bi-atrial enlargement  -No signs of exacerbation   -Continue to monitor on telemetry  -Monitor intake and output and obtain daily STANDING weights  -Fluid restriction to 1.5L per day and cardiac diet with salt restriction  -Supplemental O2 to keep Spo2 >90%    Severe malnutrition:  -Nutrition consulted  -Novasource @ 44 mL/hr to provide 1440 kcals, 65 g of protein, 516 mL fluid.   -Continue TFs     Hematemesis:  -Patient with reports of coffee ground emesis prior to transport to ED. In ED, dark brown contents suctioned from stomach. Hemoglobin remains stable and pt does not have any HD instability. Patient is well known to GI. His most recent scope was in November that just showed esophagitis similar to his previous EGD.   -GI consulted and appreciate recommendations. No scope indicated at this time  -continue protonix  -continue to monitor H&H daily  -transfuse for hemoglobin under 7  -Nursing / Phlebotomy to use pediatric tubes when drawing labs to minimize iatrogenic anemia and blood loss.      Hx GERD with esophagitis  -Continue Protonix  -Follow up with GI out patient         Discharge plan and follow up: DC to SNF once medically stable     Jaskaran Colon MD  Hospital Medicine Staff  [phone] pager

## 2020-02-07 NOTE — PLAN OF CARE
Problem: Physical Therapy Goal  Goal: Physical Therapy Goal  Description  Goals to be met by: 2020    Patient will increase functional independence with mobility by performin. Supine to sit with Moderate Assistance - not met  2. Rolling to Left with Moderate Assistance. - not met  3. Rolling to Right with Moderate Assistance - not met  4. Sit to stand transfer with Moderate Assistance - not met  5. Sitting at edge of bed x8 minutes with Contact Guard Assistance - not met  6. Lower extremity exercise program x15 reps per handout, with assistance as needed - not met     Outcome: Ongoing, Progressing        Discharge Recommendations: SNF in NH vs longterm    Pt requires max A to sit at the EOB.    Goals remain appropriate.     Ann Marie Klein, PTA.   [phone]   2020

## 2020-02-07 NOTE — PROGRESS NOTES
"[facility]  Adult Nutrition  Progress Note    SUMMARY       Recommendations    1. Continue Novasource Renal formula at goal rate 44ml/hr (provides 2112 kcal, 96g pro, 1056ml free water). Additional 100ml water flush Q6hrs or per MD. Hold for residuals>500ml.  Goals: Meet % EEN, EPN  Nutrition Goal Status: goal met  Communication of RD Recs: reviewed with physician    Reason for Assessment    Reason For Assessment: RD follow-up  Diagnosis: other (see comments)(Seizures)  Relevant Medical History: HTN, ESRD on HD, L. BKA  Interdisciplinary Rounds: attended  General Information Comments: Pt noted  with severe malnutrition. NG tube replaced, pt tolerating TF well. Unable to reassess as pt was off unit. Will follow up.  Nutrition Discharge Planning: unable to assess at this time.    Nutrition Risk Screen    Nutrition Risk Screen: tube feeding or parenteral nutrition    Nutrition/Diet History    Spiritual, Cultural Beliefs, [employer] Practices, Values that Affect Care: no  Factors Affecting Nutritional Intake: NPO    Anthropometrics    Temp: 98.1 °F (36.7 °C)  Height: 5' 6" (167.6 cm)  Height (inches): 66 in  Weight Method: Bed Scale  Weight: 56.1 kg (123 lb 10.9 oz)  Weight (lb): 123.68 lb  Ideal Body Weight (IBW), Male: 142 lb  % Ideal Body Weight, Male (lb): 88.49 %  BMI (Calculated): 20  BMI Grade: 18.5-24.9 - normal  Usual Body Weight (UBW), k kg  % Usual Body Weight: 86.54  % Weight Change From Usual Weight: -13.64 %  Amputation %: 5.9  Total Amputation %: 5.9  Amputation Ideal Body Weight (IBW), Male (lb): 136.1 lb  Amputee BMI (kg/m2): 21.61 kg/m2       Lab/Procedures/Meds    Pertinent Labs Reviewed: reviewed  Pertinent Labs Comments: BUN 68, Creat 8.1, Glu 162, Phos 5.2  Pertinent Medications Reviewed: reviewed  Pertinent Medications Comments: pantoprazole, sevelamer carbonate      Estimated/Assessed Needs    Weight Used For Calorie Calculations: 60.2 kg (132 lb 11.5 oz)(pt " extubated, nutrition reassessment)  Energy Calorie Requirements (kcal): 2107 kcal  Energy Need Method: Kcal/kg  Protein Requirements: 90-103g/day  Weight Used For Protein Calculations: 60.2 kg (132 lb 11.5 oz)     Estimated Fluid Requirement Method: other (see comments)(Per MD or 1 mL/kcal)  RDA Method (mL): 2107         Nutrition Prescription Ordered    Current Diet Order: NPO  Current Nutrition Support Formula Ordered: Novasource Renal  Current Nutrition Support Rate Ordered: 44 (ml)  Current Nutrition Support Frequency Ordered: mL/hr    Evaluation of Received Nutrient/Fluid Intake    Enteral Calories (kcal): 2112  Enteral Protein (gm): 96  Enteral (Free Water) Fluid (mL): 1056  Other Calories (kcal): 0  % Kcal Needs: 100  % Protein Needs: 100  Energy Calories Required: meeting needs  Protein Required: meeting needs  Fluid Required: meeting needs  Comments: LBM 2/01  Tolerance: tolerating  % Intake of Estimated Energy Needs: 75 - 100 %  % Meal Intake: NPO    Nutrition Risk    Level of Risk/Frequency of Follow-up: low     Assessment and Plan    Severe malnutrition    Nutrition Problem:  Severe Protein-Calorie Malnutrition  Malnutrition in the context of Chronic Illness/Injury    Related to (etiology):  Inadequate energy intake    Signs and Symptoms (as evidenced by):  Body Fat Depletion: severe depletion of orbitals and triceps   Muscle Mass Depletion: severe depletion of temples, clavicle region and interosseous muscle   Weight Loss: 14% x 6 months     Interventions(treatment strategy):  Collaboration of nutrition care w/ other providers    Nutrition Diagnosis Status:  continues      Monitor and Evaluation    Food and Nutrient Intake: energy intake, enteral nutrition intake  Food and Nutrient Adminstration: enteral and parenteral nutrition administration  Physical Activity and Function: nutrition-related ADLs and IADLs  Anthropometric Measurements: weight, weight change  Biochemical Data, Medical Tests and  Procedures: inflammatory profile, lipid profile, glucose/endocrine profile, gastrointestinal profile, electrolyte and renal panel  Nutrition-Focused Physical Findings: overall appearance     Malnutrition Assessment             Weight Loss (Malnutrition): greater than 10% in 6 months  Energy Intake (Malnutrition): other (see comments)(GONZALO)  Subcutaneous Fat (Malnutrition): severe depletion  Muscle Mass (Malnutrition): severe depletion   Orbital Region (Subcutaneous Fat Loss): severe depletion  Upper Arm Region (Subcutaneous Fat Loss): severe depletion   Zoroastrian Region (Muscle Loss): severe depletion  Clavicle Bone Region (Muscle Loss): severe depletion  Scapular Bone Region (Muscle Loss): severe depletion  Dorsal Hand (Muscle Loss): severe depletion  Anterior Thigh Region (Muscle Loss): (GONZALO)  Posterior Calf Region (Muscle Loss): (GONZALO)                 Nutrition Follow-Up    RD Follow-up?: Yes

## 2020-02-07 NOTE — PROGRESS NOTES
Hospital Medicine   Progress note      Team: [facility] HOSP MED G Jaskaran Colon MD   Admit Date: 1/20/2020   Hospital Day: 17  JUAN: 2/11/2020   Code status: Full Code   Principal Problem: Seizure     Summary: Patient is a 54 year old male with extensive medical history including ESRD on dialysis MWF (has not received it today), L below knee amputation 2/2 osteomyelitis, [place] [facility] [place] GERD, h/o multiple ICH w/o residual deficits, and multiple instances of GI bleed (last EGD 11/12/19, esophagitis) who presents to ED [facility] home due to altered mental status. From NH report, nursing home staff went to wake the patient for his morning dialysis. He was confused, lethargic, had a moderate amount of brown colored emesis in his trash can. Patient last got dialysis on Friday. Patient is normally able to ambulate on his own and is alert and oriented; nursing staff reports that he appeared normal day before admission. Patient admitted to critical care with concerns of new onset seizures and s/p intubation for airway protection. Patient was started on vanc, rocephin, ampicillin, and acyclovir to cover for meningitis. Patient had spot EEG while on propofol in ED which did not show seizure activity. MRI done showed chronic changes but no acute abnormalities. Lumbar puncture was done after MRI. CSF labs were not convincing for bacterial or viral meningitis and antibiotics were weaned down. Continuous EEG was done after propofol was stopped which showed epileptiform discharges. Per epilepsy, patient was given another dose of Keppra. Nephrology consulted and started HD. Pt became febrile the night of 01/22; blood cultures repeated and restarted on Vanc and Zosyn. HSV PCR neg so Acyclovir discontinued.  Neurology was consulted and pt was continued on Keppra but dose was decreased. 01/27 Patient's neuro exam is somewhat improved from day prior per nurse - opening eyes spontaneously and occasionally tracking  "movements, blinking to threat. 01/28 passed SBT today, plan to extubate 01/29. Repeat 24 hr EEG shows "multifocal slowing consistent with multiple areas of focal cortical dysfunction and occasional epileptiform discharges in the left frontotemporal region consistent with a potential seizure focus in this area" with no electrographic seizures. 01/29 Originally planned for extubation, but was deferred in light of BRBPR ~08:30 per nurse. GI consulted, no scope indicated at this time. Otherwise mental status improving in small increments daily. 01/30 Patient successfully extubated, though very weak. Unable to clear secretions effectively at this time, weak cough. 2/1- lethargic, EEG was done with no seizure activity, Keppra dose was decreased.  2/2 - pt was more awake after decreasing Keppra dose further to 250mg BID. Pt able to answer simple questions appropriate and follow simple commands. SLP following but pt continues to fail swallow study. NGT placed. Pt started on tube feeds and tolerating well. He continues to be lethargic at this time.    Interval hx:   Pt was seen and examined at bedside. Pt had no acute events overnight, and no new complaints this morning. Pt remained hemodynamically stable and afebrile. Patient is alert oriented x 3, but continues to be lethargic. No other complaints, including pain or discomfort. Continues to be NPO requiring NG TF.       ROS (Positive in Bold, otherwise negative)  Constitutional: fever, chills, night sweats. Generalized weakness   CV: chest pain, edema, palpitations  Resp: SOB, cough, sputum production  GI: dysphagia, changes in appetite, NVDC, pain, melena, hematochezia, GERD, hematemesis  : Dysuria, hematuria, urinary urgency, frequency  MSK: arthralgia/myalgia, joint swelling  Neuro/Psych: anxiety, depression, seizure     PEx   Temp:  [97.5 °F (36.4 °C)-99.7 °F (37.6 °C)]   Pulse:  [90-98]   Resp:  [17-19]   BP: ()/(53-76)   SpO2:  [94 %-100 %]      I & O (Last " 24H):   No intake or output data in the 24 hours ending 02/06/20 1952    General:  male  in no acute distress. Nontoxic. Resting in bed. Cooperative.  Aox4  HEENT: NCAT. PERRL. EOMI. Sclera Anicteric.  NG tube in place  CVS: RRR. Normal S1 S2. No murmurs  Pulm: CTAB. Normal respiratory effort. No wheezes, rhonchi, or crackles.  Abdomen: Soft. Non-distended. No tenderness to palpation. No rebound or guarding. +BS.  Extremities: No edema. No cyanosis. Full ROM.  Globally weak  Neuro: Alert, oriented x 4, Spont mvt of all extremities with no focal deficits noted. Globally weak.  Able to answer questions appropriately and follow simple commands.    Recent Results (from the past 24 hour(s))   POCT glucose    Collection Time: 02/05/20 10:19 PM   Result Value Ref Range    POCT Glucose 154 (H) 70 - 110 mg/dL   Comprehensive metabolic panel    Collection Time: 02/06/20  3:10 AM   Result Value Ref Range    Sodium 140 136 - 145 mmol/L    Potassium 3.8 3.5 - 5.1 mmol/L    Chloride 95 95 - 110 mmol/L    CO2 26 23 - 29 mmol/L    Glucose 175 (H) 70 - 110 mg/dL    BUN, Bld 38 (H) 6 - 20 mg/dL    Creatinine 6.1 (H) 0.5 - 1.4 mg/dL    Calcium 10.7 (H) 8.7 - 10.5 mg/dL    Total Protein 10.5 (H) 6.0 - 8.4 g/dL    Albumin 3.0 (L) 3.5 - 5.2 g/dL    Total Bilirubin 0.4 0.1 - 1.0 mg/dL    Alkaline Phosphatase 255 (H) 55 - 135 U/L    AST 41 (H) 10 - 40 U/L    ALT 25 10 - 44 U/L    Anion Gap 19 (H) 8 - 16 mmol/L    eGFR if African American 11.0 (A) >60 mL/min/1.73 m^2    eGFR if non African American 9.5 (A) >60 mL/min/1.73 m^2   Magnesium    Collection Time: 02/06/20  3:10 AM   Result Value Ref Range    Magnesium 2.4 1.6 - 2.6 mg/dL   Phosphorus    Collection Time: 02/06/20  3:10 AM   Result Value Ref Range    Phosphorus 6.0 (H) 2.7 - 4.5 mg/dL   CBC auto differential    Collection Time: 02/06/20  3:10 AM   Result Value Ref Range    WBC 7.50 3.90 - 12.70 K/uL    RBC 4.66 4.60 - 6.20 M/uL    Hemoglobin 12.9 (L) 14.0 - 18.0  g/dL    Hematocrit 41.1 40.0 - 54.0 %    Mean Corpuscular Volume 88 82 - 98 fL    Mean Corpuscular Hemoglobin 27.7 27.0 - 31.0 pg    Mean Corpuscular Hemoglobin Conc 31.4 (L) 32.0 - 36.0 g/dL    RDW 16.1 (H) 11.5 - 14.5 %    Platelets 322 150 - 350 K/uL    MPV 11.6 9.2 - 12.9 fL    Immature Granulocytes 0.4 0.0 - 0.5 %    Gran # (ANC) 5.3 1.8 - 7.7 K/uL    Immature Grans (Abs) 0.03 0.00 - 0.04 K/uL    Lymph # 1.1 1.0 - 4.8 K/uL    Mono # 0.9 0.3 - 1.0 K/uL    Eos # 0.1 0.0 - 0.5 K/uL    Baso # 0.04 0.00 - 0.20 K/uL    nRBC 0 0 /100 WBC    Gran% 70.6 38.0 - 73.0 %    Lymph% 15.1 (L) 18.0 - 48.0 %    Mono% 12.1 4.0 - 15.0 %    Eosinophil% 1.3 0.0 - 8.0 %    Basophil% 0.5 0.0 - 1.9 %    Differential Method Automated    Hepatitis panel, acute    Collection Time: 02/06/20  3:10 AM   Result Value Ref Range    Hepatitis B Surface Ag Negative Negative    Hep B C IgM Negative Negative    Hep A IgM Negative Negative    Hepatitis C Ab Positive (A) Negative   Hemoglobin A1c    Collection Time: 02/06/20  3:10 AM   Result Value Ref Range    Hemoglobin A1C 4.7 4.0 - 5.6 %    Estimated Avg Glucose 88 68 - 131 mg/dL   POCT glucose    Collection Time: 02/06/20  5:31 AM   Result Value Ref Range    POCT Glucose 232 (H) 70 - 110 mg/dL   POCT glucose    Collection Time: 02/06/20 11:25 AM   Result Value Ref Range    POCT Glucose 176 (H) 70 - 110 mg/dL       Recent Labs   Lab 02/02/20  1513 02/03/20  0812 02/05/20  1639 02/05/20  2219 02/06/20  0531 02/06/20  1125   POCTGLUCOSE 104 109 138* 154* 232* 176*       Hemoglobin A1C   Date Value Ref Range Status   02/06/2020 4.7 4.0 - 5.6 % Final     Comment:     ADA Screening Guidelines:  5.7-6.4%  Consistent with prediabetes  >or=6.5%  Consistent with diabetes  High levels of fetal hemoglobin interfere with the HbA1C  assay. Heterozygous hemoglobin variants (HbS, HgC, etc)do  not significantly interfere with this assay.   However, presence of multiple variants may affect accuracy.      11/09/2019 4.0 4.0 - 5.6 % Final     Comment:     ADA Screening Guidelines:  5.7-6.4%  Consistent with prediabetes  >or=6.5%  Consistent with diabetes  High levels of fetal hemoglobin interfere with the HbA1C  assay. Heterozygous hemoglobin variants (HbS, HgC, etc)do  not significantly interfere with this assay.   However, presence of multiple variants may affect accuracy.     06/22/2019 4.7 4.0 - 5.6 % Final     Comment:     ADA Screening Guidelines:  5.7-6.4%  Consistent with prediabetes  >or=6.5%  Consistent with diabetes  High levels of fetal hemoglobin interfere with the HbA1C  assay. Heterozygous hemoglobin variants (HbS, HgC, etc)do  not significantly interfere with this assay.   However, presence of multiple variants may affect accuracy.     06/22/2019 4.7 4.0 - 5.6 % Final     Comment:     ADA Screening Guidelines:  5.7-6.4%  Consistent with prediabetes  >or=6.5%  Consistent with diabetes  High levels of fetal hemoglobin interfere with the HbA1C  assay. Heterozygous hemoglobin variants (HbS, HgC, etc)do  not significantly interfere with this assay.   However, presence of multiple variants may affect accuracy.          Active Hospital Problems    Diagnosis  POA    *Seizure [R56.9]  Yes    ESRD on dialysis [N18.6, Z99.2]  Not Applicable    Aphasia [R47.01]  Clinically Undetermined    Pressure injury due to medical device [T85.898A, L89.90]  Yes    Altered mental status [R41.82]  Yes    Gastrointestinal hemorrhage with hematemesis [K92.0]  Yes    GERD with esophagitis [K21.0]  Yes    Severe malnutrition [E43]  Yes     Assessment and Plan  Severe Malnutrition in the context of Social/Environmental Circumstances     Related to (etiology):  Unknown etiology     Signs and Symptoms (as evidenced by):  Energy Intake: per pt, <75% intake over the last year  Body Fat Depletion: overall subcutaneous fat loss, moderate triceps fat loss and protruding patellas.  Muscle Mass Depletion:  moderate muscle wasting  of interosseous, temporal, clavicle, calves and quadriceps  Weight Loss: 24% (39 lbs) x 1 year    Recommendations     Recommendation/Intervention: 1. Should pt be cleared for po diet, recommend renal diet with texture per SLP along with Novasource ONS TID. 2. Should pt require enteral feeding, initiate Novasource renal formula at 10ml/hr and advance 10ml Q4hrs to goal rate of 40ml/hr (provides 1920kcal, 87g pro, 691ml free water). Provide additional 500ml water flush/24 hrs. Hold for residuals >500ml.  Goals: 1. Pt to receive nutrition < 48 hrs.      Renovascular hypertension [I15.0]  Yes     Chronic    Chronic diastolic heart failure [I50.32]  Yes     Chronic     2-23-17    1 - Low normal to mildly depressed left ventricular systolic function (EF 50-55%).     2 - Right ventricular enlargement with mildly depressed systolic function.     3 - Left ventricular diastolic dysfunction.     4 - Right atrial enlargement.     5 - Severe tricuspid regurgitation.     6 - Pulmonary hypertension. The estimated PA systolic pressure is 86 mmHg.     7 - Increased central venous pressure.       Encephalopathy [G93.40]  Yes      Resolved Hospital Problems    Diagnosis Date Resolved POA    Acute metabolic encephalopathy [G93.41] 01/23/2020 Yes    ESRD on hemodialysis [N18.6, Z99.2] 01/31/2020 Not Applicable     Chronic     MWF at [facility]            Assessment and Plan for problems addressed today:      [START ON 2/7/2020] sodium chloride 0.9%   Intravenous Once    carvediloL  3.125 mg Oral BID    heparin (porcine)  5,000 Units Subcutaneous Q8H    levetiracetam oral soln  250 mg Per G Tube BID    midodrine  5 mg Per NG tube Every Mon, Wed, Fri    pantoprozole (PROTONIX) IV  40 mg Intravenous Q12H    sevelamer carbonate  1.6 g Per OG tube TID     acetaminophen, albuterol-ipratropium, Dextrose 10% Bolus, Dextrose 10% Bolus, glucagon (human recombinant), glucose, glucose, insulin aspart U-100, ondansetron, polyethylene  glycol, prochlorperazine, senna-docusate 8.6-50 mg, sodium chloride 0.9%    Acute encephalopathy: resolved  Seizures:  -Patient initially presented to ED prior to getting dialysis due to AMS and brown emesis. Had witnessed tonic clonic seizure requiring ativan shortly after getting a CT head. He was loaded with 1500 mg Keppra, intubated for airway protection, and started on propofol for sedation. Upon physical exam, patient remained unresponsive to verbal or tactile stimuli and had upward left sided gaze with sluggish pupils. Concern for status epilepticus.    -UDS negative, alcohol level wnl  -Sepsis: Initial lactate 2.4, likely due to seizure event; repeat was 1.9. Patient received 500 ml saline in ED. Initial blood cultures, sputum culture+gram stain neg  Lumbar puncture done, CSF not convincing for meningitis, so ampicillin, vancomycin, rocephin d/c'd on 01/22. However, pt was febrile on 11/23 to 101.3. Blood cultures repeated and restarted on Vanc and Zosyn. Repeat cultures NGTD. HSV PCR neg; Acyclovir discontinued. Pt has remained afebrile. Vanc and Zosyn discontinued 01/26.   -Intracranial: patient with history of ICH with no functional deficits. Possibly multiple foci for seizure overtime. CT without acute changes, MRI brain with chronic changes and hypertensive angiopathy; no acute changes.  - PRES: Patient off cardene drip. MRI reviewed. Will follow blood pressure as patient is normally hypotensive.   -Spot EEG without evidence of current seizure. However patient already received keppra, ativan, and sedated on propofol. 24 hour EEG with L sided structural lesion and underlying epileptiform potential. Initiated 1500 mg keppra on 01/22, per Neurology recs.  -Neurology consulted. repeat EEG on 01/27 shows diffuse slowing, though no focal activity seen on EEG.   -01/28 patient's mental status is continuing to improve day to day, now following commands. 01/30 Patient is alert and following commands. Passed SBT  and extubated. Breathing well though with generalized weakness as well as weak cough.   -Mental status continues to improve gradually daily   -continue keppra 250mg BID per neuro recs   -continue aspiration precautions, fall precautions, seizure precaution  -neuro checks  -work with OT/PT/SLP      Oropharyngeal dysphagia:  -likely secondary to acute encephalopathy  -SLP consulted, recommending to keep patient NPO for now  -NG tube placed, continue tube feeds  -aspiration precaution  -continue to work with SLP, advance diet as tolerated  -if unable to wean off TF may need PEG which patient is agreeable to      Renovascular hypertension  -Patient on coreg at nursing home; has been compliant as given by nursing home. Patient with hypertensive emergency on admit and started Cardene drip to titrate to BP of 160-180. Off Cardene drip since 01/20.   -Goal SBP < 160 per Neurology.   -Continue home carvedilol 3.125 mg BID    ESRD on HD:  -Pt undergoes HD MWF outpatient  -Consult nephrology to continue HD inpatient   -Renally dose medications and avoid nephrotoxic medications to preserve residual renal function   -Strict I/O's and daily weights  -Continue to monitor renal function with daily labs     Chronic diastolic heart failure  -[place] [facility] [place], EF>55%, bi-atrial enlargement  -No signs of exacerbation   -Continue to monitor on telemetry  -Monitor intake and output and obtain daily STANDING weights  -Fluid restriction to 1.5L per day and cardiac diet with salt restriction  -Supplemental O2 to keep Spo2 >90%    Severe malnutrition:  -Nutrition consulted  -Novasource @ 44 mL/hr to provide 1440 kcals, 65 g of protein, 516 mL fluid.   -Continue TFs     Hematemesis:  -Patient with reports of coffee ground emesis prior to transport to ED. In ED, dark brown contents suctioned from stomach. Hemoglobin remains stable and pt does not have any HD instability. Patient is well known to GI. His most recent scope was in  November that just showed esophagitis similar to his previous EGD.   -GI consulted and appreciate recommendations. No scope indicated at this time  -continue protonix  -continue to monitor H&H daily  -transfuse for hemoglobin under 7  -Nursing / Phlebotomy to use pediatric tubes when drawing labs to minimize iatrogenic anemia and blood loss.      Hx GERD with esophagitis  -Continue Protonix  -Follow up with GI out patient      Discharge plan and follow up: DC to SNF once medically stable     Jaskaran Colon MD  Hospital Medicine Staff  [phone] pager

## 2020-02-07 NOTE — NURSING
"Upon entry to room pt noted to have removed NGT.  Pt increasingly more agitated.  VSS.  Pt initially refused to have NGT placed but after re-direction/re-education pt now compliant with placement.  States "just hold my head."  Call placed to on-call IMG MD [place] to notify.  Order given for bilateral wrist restraints and okay to re-insert tube and obtain KUB once placed.  Order noted and carried out.     "

## 2020-02-08 NOTE — PLAN OF CARE
"POC reviewed with pt.  Pt remains AAOx4.  Pt able to state name, date of birth, and year, and able to answer "yes" to being in the hospital when assessing orientation.  Pt denies any complaints of numbness or tingling.  Pt lethargic throughout night, and is slow to respond when asked questions.  Tolerating tube feedings well.  Tube feeding at 30 cc/hr with 160 mL residual.  Did not increase tube feeds overnight. Pt denies any complaints of pain or distress.  Skin tear noted to scrotum. Barrier cream applied.  Pt in bilateral wrist restraints with no signs of injury.  Restraints renewed at 0441.  Fall/safety precautions maintained.  Pt remained free of injury and falls during shift.  Bed locked and in lowest position.  Call light within reach.  See flowsheets for full assessments and vital signs. WCTM.       Problem: Fall Injury Risk  Goal: Absence of Fall and Fall-Related Injury  Outcome: Ongoing, Progressing     Problem: Infection  Goal: Infection Symptom Resolution  Outcome: Ongoing, Progressing     Problem: Adult Inpatient Plan of Care  Goal: Plan of Care Review  Outcome: Ongoing, Progressing  Goal: Optimal Comfort and Wellbeing  Outcome: Ongoing, Progressing     Problem: Restraint, Nonbehavioral (Nonviolent)  Goal: Personal Dignity and Safety Maintained  Outcome: Ongoing, Progressing     Problem: Skin Injury Risk Increased  Goal: Skin Health and Integrity  Outcome: Ongoing, Progressing     "

## 2020-02-08 NOTE — PROGRESS NOTES
Hospital Medicine   Progress note      Team: [facility] HOSP MED G Jaskaran Colon MD   Admit Date: 1/20/2020   Hospital Day: 19  JUAN: 2/11/2020   Code status: Full Code   Principal Problem: Seizure     Summary: Patient is a 54 year old male with extensive medical history including ESRD on dialysis MWF (has not received it today), L below knee amputation 2/2 osteomyelitis, dCHF (last echo 8/2/19 [facility] [place]), GERD, h/o multiple ICH w/o residual deficits, and multiple instances of GI bleed (last EGD 11/12/19, esophagitis) who presents to ED [facility] nursing home due to altered mental status. From NH report, nursing home staff went to wake the patient for his morning dialysis. He was confused, lethargic, had a moderate amount of brown colored emesis in his trash can. Patient last got dialysis on Friday. Patient is normally able to ambulate on his own and is alert and oriented; nursing staff reports that he appeared normal day before admission. Patient admitted to critical care with concerns of new onset seizures and s/p intubation for airway protection. Patient was started on vanc, rocephin, ampicillin, and acyclovir to cover for meningitis. Patient had spot EEG while on propofol in ED which did not show seizure activity. MRI done showed chronic changes but no acute abnormalities. Lumbar puncture was done after MRI. CSF labs were not convincing for bacterial or viral meningitis and antibiotics were weaned down. Continuous EEG was done after propofol was stopped which showed epileptiform discharges. Per epilepsy, patient was given another dose of Keppra. Nephrology consulted and started HD. Pt became febrile the night of 01/22; blood cultures repeated and restarted on Vanc and Zosyn. HSV PCR neg so Acyclovir discontinued.  Neurology was consulted and pt was continued on Keppra but dose was decreased. 01/27 Patient's neuro exam is somewhat improved from day prior per nurse - opening eyes spontaneously and occasionally tracking  "movements, blinking to threat. 01/28 passed SBT today, plan to extubate 01/29. Repeat 24 hr EEG shows "multifocal slowing consistent with multiple areas of focal cortical dysfunction and occasional epileptiform discharges in the left frontotemporal region consistent with a potential seizure focus in this area" with no electrographic seizures. 01/29 Originally planned for extubation, but was deferred in light of BRBPR ~08:30 per nurse. GI consulted, no scope indicated at this time. Otherwise mental status improving in small increments daily. 01/30 Patient successfully extubated, though very weak. Unable to clear secretions effectively at this time, weak cough. 2/1- lethargic, EEG was done with no seizure activity, Keppra dose was decreased.  2/2 - pt was more awake after decreasing Keppra dose further to 250mg BID. Pt able to answer simple questions appropriate and follow simple commands. SLP following but pt continues to fail swallow study. NGT placed. Pt started on tube feeds and tolerating well. He continues to be lethargic at this time and requiring TF through NGT.    Interval hx:   Pt was seen and examined at bedside. Continues to be weak and lethargic (same as yesterday). Initially nauseated when seen. NGT needs to be advanced, discussed with RN. Able to speak to me today, but continue to have slow response. No new complaints. BP continues to be soft. Able to answer questions, but very slow. HD today, was nauseated and lethargic post HD. SLP following. Continue TF for now.      ROS (Positive in Bold, otherwise negative)  Constitutional: fever, chills, night sweats. Generalized weakness   CV: chest pain, edema, palpitations  Resp: SOB, cough, sputum production  GI: dysphagia, changes in appetite, NVDC, pain, melena, hematochezia, GERD, hematemesis  : Dysuria, hematuria, urinary urgency, frequency  MSK: arthralgia/myalgia, joint swelling  Neuro/Psych: anxiety, depression, seizure     PEx   Temp:  [98.4 °F (36.9 " °C)-99.6 °F (37.6 °C)]   Pulse:  [103-117]   Resp:  [15-19]   BP: ()/(56-72)   SpO2:  [96 %-100 %]      I & O (Last 24H):     Intake/Output Summary (Last 24 hours) at 2/8/2020 1453  Last data filed at 2/8/2020 0600  Gross per 24 hour   Intake 310 ml   Output 0 ml   Net 310 ml       General:  male  in no acute distress. Lethargic. Appears chronically ill. Resting in bed. Cooperative.  Aox4  HEENT: NCAT. PERRL. EOMI. Sclera Anicteric. NG tube in place  CVS: RRR. Normal S1 S2. No murmurs  Pulm: CTAB. Normal respiratory effort. No wheezes, rhonchi, or crackles.  Abdomen: Soft. Non-distended. No tenderness to palpation. No rebound or guarding. +BS.  Extremities: No edema. No cyanosis. Full ROM.  Globally weak. Left BKA  Neuro: Alert, oriented x 4, Spont mvt of all extremities with no focal deficits noted. Globally weak.  Able to answer questions appropriately and follow simple commands.      Recent Results (from the past 24 hour(s))   POCT glucose    Collection Time: 02/07/20  5:14 PM   Result Value Ref Range    POCT Glucose 164 (H) 70 - 110 mg/dL   POCT glucose    Collection Time: 02/07/20  8:12 PM   Result Value Ref Range    POCT Glucose 144 (H) 70 - 110 mg/dL   POCT glucose    Collection Time: 02/08/20 12:18 AM   Result Value Ref Range    POCT Glucose 206 (H) 70 - 110 mg/dL   Magnesium    Collection Time: 02/08/20  5:54 AM   Result Value Ref Range    Magnesium 2.2 1.6 - 2.6 mg/dL   Renal function panel    Collection Time: 02/08/20  5:54 AM   Result Value Ref Range    Glucose 194 (H) 70 - 110 mg/dL    Sodium 135 (L) 136 - 145 mmol/L    Potassium 3.7 3.5 - 5.1 mmol/L    Chloride 96 95 - 110 mmol/L    CO2 22 (L) 23 - 29 mmol/L    BUN, Bld 45 (H) 6 - 20 mg/dL    Calcium 10.5 8.7 - 10.5 mg/dL    Creatinine 5.5 (H) 0.5 - 1.4 mg/dL    Albumin 2.6 (L) 3.5 - 5.2 g/dL    Phosphorus 3.4 2.7 - 4.5 mg/dL    eGFR if  12.4 (A) >60 mL/min/1.73 m^2    eGFR if non African American 10.7 (A) >60  mL/min/1.73 m^2    Anion Gap 17 (H) 8 - 16 mmol/L   POCT glucose    Collection Time: 02/08/20  6:08 AM   Result Value Ref Range    POCT Glucose 201 (H) 70 - 110 mg/dL   Ammonia    Collection Time: 02/08/20  9:44 AM   Result Value Ref Range    Ammonia 30 10 - 50 umol/L   POCT glucose    Collection Time: 02/08/20 11:05 AM   Result Value Ref Range    POCT Glucose 214 (H) 70 - 110 mg/dL   POCT glucose    Collection Time: 02/08/20  2:08 PM   Result Value Ref Range    POCT Glucose 155 (H) 70 - 110 mg/dL       Recent Labs   Lab 02/07/20  1714 02/07/20  2012 02/08/20  0018 02/08/20  0608 02/08/20  1105 02/08/20  1408   POCTGLUCOSE 164* 144* 206* 201* 214* 155*       Hemoglobin A1C   Date Value Ref Range Status   02/06/2020 4.7 4.0 - 5.6 % Final     Comment:     ADA Screening Guidelines:  5.7-6.4%  Consistent with prediabetes  >or=6.5%  Consistent with diabetes  High levels of fetal hemoglobin interfere with the HbA1C  assay. Heterozygous hemoglobin variants (HbS, HgC, etc)do  not significantly interfere with this assay.   However, presence of multiple variants may affect accuracy.     11/09/2019 4.0 4.0 - 5.6 % Final     Comment:     ADA Screening Guidelines:  5.7-6.4%  Consistent with prediabetes  >or=6.5%  Consistent with diabetes  High levels of fetal hemoglobin interfere with the HbA1C  assay. Heterozygous hemoglobin variants (HbS, HgC, etc)do  not significantly interfere with this assay.   However, presence of multiple variants may affect accuracy.     06/22/2019 4.7 4.0 - 5.6 % Final     Comment:     ADA Screening Guidelines:  5.7-6.4%  Consistent with prediabetes  >or=6.5%  Consistent with diabetes  High levels of fetal hemoglobin interfere with the HbA1C  assay. Heterozygous hemoglobin variants (HbS, HgC, etc)do  not significantly interfere with this assay.   However, presence of multiple variants may affect accuracy.     06/22/2019 4.7 4.0 - 5.6 % Final     Comment:     ADA Screening Guidelines:  5.7-6.4%   Consistent with prediabetes  >or=6.5%  Consistent with diabetes  High levels of fetal hemoglobin interfere with the HbA1C  assay. Heterozygous hemoglobin variants (HbS, HgC, etc)do  not significantly interfere with this assay.   However, presence of multiple variants may affect accuracy.          Active Hospital Problems    Diagnosis  POA    *Seizure [R56.9]  Yes    ESRD on dialysis [N18.6, Z99.2]  Not Applicable    Aphasia [R47.01]  Clinically Undetermined    Pressure injury due to medical device [T85.898A, L89.90]  Yes    Altered mental status [R41.82]  Yes    Gastrointestinal hemorrhage with hematemesis [K92.0]  Yes    GERD with esophagitis [K21.0]  Yes    Severe malnutrition [E43]  Yes     Assessment and Plan  Severe Malnutrition in the context of Social/Environmental Circumstances     Related to (etiology):  Unknown etiology     Signs and Symptoms (as evidenced by):  Energy Intake: per pt, <75% intake over the last year  Body Fat Depletion: overall subcutaneous fat loss, moderate triceps fat loss and protruding patellas.  Muscle Mass Depletion:  moderate muscle wasting of interosseous, temporal, clavicle, calves and quadriceps  Weight Loss: 24% (39 lbs) x 1 year    Recommendations     Recommendation/Intervention: 1. Should pt be cleared for po diet, recommend renal diet with texture per SLP along with Novasource ONS TID. 2. Should pt require enteral feeding, initiate Novasource renal formula at 10ml/hr and advance 10ml Q4hrs to goal rate of 40ml/hr (provides 1920kcal, 87g pro, 691ml free water). Provide additional 500ml water flush/24 hrs. Hold for residuals >500ml.  Goals: 1. Pt to receive nutrition < 48 hrs.      Renovascular hypertension [I15.0]  Yes     Chronic    Chronic diastolic heart failure [I50.32]  Yes     Chronic     2-23-17    1 - Low normal to mildly depressed left ventricular systolic function (EF 50-55%).     2 - Right ventricular enlargement with mildly depressed systolic function.      3 - Left ventricular diastolic dysfunction.     4 - Right atrial enlargement.     5 - Severe tricuspid regurgitation.     6 - Pulmonary hypertension. The estimated PA systolic pressure is 86 mmHg.     7 - Increased central venous pressure.       Encephalopathy [G93.40]  Yes      Resolved Hospital Problems    Diagnosis Date Resolved POA    Acute metabolic encephalopathy [G93.41] 01/23/2020 Yes    ESRD on hemodialysis [N18.6, Z99.2] 01/31/2020 Not Applicable     Chronic     MWF at [facility]            Assessment and Plan for problems addressed today:      [START ON 2/9/2020] sodium chloride 0.9%   Intravenous Once    heparin (porcine)  5,000 Units Subcutaneous Q8H    insulin detemir U-100  5 Units Subcutaneous Daily    levetiracetam oral soln  250 mg Per G Tube BID    midodrine  5 mg Per NG tube Daily    pantoprozole (PROTONIX) IV  40 mg Intravenous Q12H    sevelamer carbonate  1.6 g Per OG tube TID     acetaminophen, albuterol-ipratropium, bisacodyL, Dextrose 10% Bolus, Dextrose 10% Bolus, glucagon (human recombinant), glucose, glucose, insulin aspart U-100, ondansetron, polyethylene glycol, prochlorperazine, senna-docusate 8.6-50 mg, sodium chloride 0.9%    Acute encephalopathy: resolved  Seizures:  -Patient initially presented to ED prior to getting dialysis due to AMS and brown emesis. Had witnessed tonic clonic seizure requiring ativan shortly after getting a CT head. He was loaded with 1500 mg Keppra, intubated for airway protection, and started on propofol for sedation. Upon physical exam, patient remained unresponsive to verbal or tactile stimuli and had upward left sided gaze with sluggish pupils. Concern for status epilepticus.    -UDS negative, alcohol level wnl  -Sepsis: Initial lactate 2.4, likely due to seizure event; repeat was 1.9. Patient received 500 ml saline in ED. Initial blood cultures, sputum culture+gram stain neg  Lumbar puncture done, CSF not convincing for meningitis, so  ampicillin, vancomycin, rocephin d/c'd on 01/22. However, pt was febrile on 11/23 to 101.3. Blood cultures repeated and restarted on Vanc and Zosyn. Repeat cultures NGTD. HSV PCR neg; Acyclovir discontinued. Pt has remained afebrile. Vanc and Zosyn discontinued 01/26.   -Intracranial: patient with history of ICH with no functional deficits. Possibly multiple foci for seizure overtime. CT without acute changes, MRI brain with chronic changes and hypertensive angiopathy; no acute changes.  - PRES: Patient off cardene drip. MRI reviewed. Will follow blood pressure as patient is normally hypotensive.   -Spot EEG without evidence of current seizure. However patient already received keppra, ativan, and sedated on propofol. 24 hour EEG with L sided structural lesion and underlying epileptiform potential. Initiated 1500 mg keppra on 01/22, per Neurology recs.  -Neurology consulted. repeat EEG on 01/27 shows diffuse slowing, though no focal activity seen on EEG.   -01/28 patient's mental status is continuing to improve day to day, now following commands. 01/30 Patient is alert and following commands. Passed SBT and extubated. Breathing well though with generalized weakness as well as weak cough.   -Mental status continues to improve very gradually daily   -continue keppra 250mg BID per neuro recs   -continue aspiration precautions, fall precautions, seizure precaution  -neuro checks  -work with OT/PT/SLP, globally weak, may have Critical illness myopathy/neuropathy  considering his global weakness and dysphagia.     Oropharyngeal dysphagia:  -likely secondary to acute encephalopathy and possible critical illness myopathy/neuropathy    -SLP consulted, recommending to keep patient NPO for now  -NG tube placed, continue tube feeds, nutrition consulted  -aspiration precaution  -continue to work with SLP, advance diet as tolerated  -if unable to wean off TF may need PEG which patient is agreeable to      Renovascular  hypertension  -Patient on coreg at nursing home; has been compliant as given by nursing home. Patient with hypertensive emergency on admit and started Cardene drip to titrate to BP of 160-180. Off Cardene drip since 01/20.   -Goal SBP < 160 per Neurology.   -Due to soft BP, dc home carvedilol 3.125 mg BID for now    ESRD on HD:  -Pt undergoes HD MWF outpatient  -Consult nephrology to continue HD inpatient   -Renally dose medications and avoid nephrotoxic medications to preserve residual renal function   -Strict I/O's and daily weights  -Continue to monitor renal function with daily labs     Chronic diastolic heart failure  -Last echo done at [facility] 8/2/19, EF>55%, bi-atrial enlargement  -No signs of exacerbation   -Continue to monitor on telemetry  -Monitor intake and output and obtain daily STANDING weights  -Fluid restriction to 1.5L per day and cardiac/renal diet with salt restriction  -Supplemental O2 to keep Spo2 >90%    Severe malnutrition:  -Nutrition consulted  -Novasource @ 44 mL/hr to provide 1440 kcals, 65 g of protein, 516 mL fluid.   -Continue TFs     Hematemesis:  -Patient with reports of coffee ground emesis prior to transport to ED. In ED, dark brown contents suctioned from stomach. Hemoglobin remains stable and pt does not have any HD instability. Patient is well known to GI. His most recent scope was in November that just showed esophagitis similar to his previous EGD.   -GI consulted and appreciate recommendations. No scope indicated at this time  -continue protonix po daily  -continue to monitor H&H daily  -transfuse for hemoglobin under 7  -Nursing / Phlebotomy to use pediatric tubes when drawing labs to minimize iatrogenic anemia and blood loss.      Hx GERD with esophagitis  -Continue Protonix  -Follow up with GI out patient           Discharge plan and follow up: DC to SNF once medically stable     Jaskaran Colon MD  Hospital Medicine Staff  [phone] pager

## 2020-02-09 NOTE — CARE UPDATE
Rapid Response Nurse Chart Check     Chart check completed, abnormal VS noted, , /60. Bedside RN Yvette contacted, no concerns verbalized at this time, instructed to call [phone] for further concerns or assistance.

## 2020-02-09 NOTE — NURSING
POC reviewed with patient. Patient did not verbalized understanding. VSS. No acute events during shift. NG tube secured. Tube feeding at 40 cc/hr with 50 mL residual. Bilateral wrist restraints in place with no signs of injury.Safety measures maintained. Bed locked in lowest position, bed alarm activated, and side rails up x 2. Call light within reach. Will continue to monitor.

## 2020-02-09 NOTE — PLAN OF CARE
POC reviewed with pt at 1700. TF at 20 ml/hr, increase TF to 30 ml/hr at 1800, goal 44 ml/hr. 12 lead EKG completed. Pt verbalized understanding. Questions and concerns addressed. No acute events today. Pt progressing toward goals. Will continue to monitor. See flowsheets for full assessment and VS info.

## 2020-02-09 NOTE — PROGRESS NOTES
Hospital Medicine   Progress note      Team: [facility] HOSP MED G Jaskaran Colon MD   Admit Date: 1/20/2020   Hospital Day: 20  JUAN: 2/11/2020   Code status: Full Code   Principal Problem: Seizure     Summary: Patient is a 54 year old male with extensive medical history including ESRD on dialysis MWF (has not received it today), L below knee amputation 2/2 osteomyelitis, dCHF (last [place] 8[place] [facility] [place] GERD, h/o multiple ICH w/o residual deficits, and multiple instances of GI bleed (last EGD 11/12/19, esophagitis) who presents to ED [facility] nursing home due to altered mental status. From NH report, nursing home staff went to wake the patient for his morning dialysis. He was confused, lethargic, had a moderate amount of brown colored emesis in his trash can. Patient last got dialysis on Friday. Patient is normally able to ambulate on his own and is alert and oriented; nursing staff reports that he appeared normal day before admission. Patient admitted to critical care with concerns of new onset seizures and s/p intubation for airway protection. Patient was started on vanc, rocephin, ampicillin, and acyclovir to cover for meningitis. Patient had spot EEG while on propofol in ED which did not show seizure activity. MRI done showed chronic changes but no acute abnormalities. Lumbar puncture was done after MRI. CSF labs were not convincing for bacterial or viral meningitis and antibiotics were weaned down. Continuous EEG was done after propofol was stopped which showed epileptiform discharges. Per epilepsy, patient was given another dose of Keppra. Nephrology consulted and started HD. Pt became febrile the night of 01/22; blood cultures repeated and restarted on Vanc and Zosyn. HSV PCR neg so Acyclovir discontinued.  Neurology was consulted and pt was continued on Keppra but dose was decreased. 01/27 Patient's neuro exam is somewhat improved from day prior per nurse - opening eyes spontaneously and occasionally tracking  "movements, blinking to threat.  passed SBT today, plan to extubate . Repeat 24 hr EEG shows "multifocal slowing consistent with multiple areas of focal cortical dysfunction and occasional epileptiform discharges in the left frontotemporal region consistent with a potential seizure focus in this area" with no electrographic seizures.  Originally planned for extubation, but was deferred in light of BRBPR ~08:30 per nurse. GI consulted, no scope indicated at this time. Otherwise mental status improving in small increments daily.  Patient successfully extubated, though very weak. Unable to clear secretions effectively at this time, weak cough. - lethargic, EEG was done with no seizure activity, Keppra dose was decreased.   - pt was more awake after decreasing Keppra dose further to 250mg BID. Pt able to answer simple questions appropriate and follow simple commands. SLP following but pt continues to fail swallow study. NGT placed. Pt started on tube feeds and tolerating well. He continues to be lethargic at this time and requiring TF through NGT.    Interval hx:   Pt was seen and examined at bedside. Appears more alert and oriented today. Probably the best Mindy seen him since . He was able to talk to me, and able to tell me his name, , place, but not date. Continues to be weakness, but def improvement noted today. Pending SLP assessment.  Continue TF for now.      ROS (Positive in Bold, otherwise negative)  Constitutional: fever, chills, night sweats. Generalized weakness   CV: chest pain, edema, palpitations  Resp: SOB, cough, sputum production  GI: dysphagia, changes in appetite, NVDC, pain, melena, hematochezia, GERD, hematemesis  : Dysuria, hematuria, urinary urgency, frequency  MSK: arthralgia/myalgia, joint swelling  Neuro/Psych: anxiety, depression, seizure     PEx   Temp:  [96.4 °F (35.8 °C)-99.4 °F (37.4 °C)]   Pulse:  [102-113]   Resp:  [16-22]   BP: ()/(60-89)   SpO2:  " [95 %-99 %]      I & O (Last 24H):     Intake/Output Summary (Last 24 hours) at 2/9/2020 1612  Last data filed at 2/9/2020 1400  Gross per 24 hour   Intake 255 ml   Output --   Net 255 ml       General:  male  in no acute distress. More alert today. Appears chronically ill. Resting in bed. Cooperative.  Aox2  HEENT: NCAT. PERRL. EOMI. Sclera Anicteric. NG tube in place  CVS: RRR. Normal S1 S2. No murmurs  Pulm: CTAB. Normal respiratory effort. No wheezes, rhonchi, or crackles.  Abdomen: Soft. Non-distended. No tenderness to palpation. No rebound or guarding. +BS.  Extremities: No edema. No cyanosis. Full ROM.  Globally weak. Left BKA  Neuro: Alert, oriented x 2, Spont mvt of all extremities with no focal deficits noted. Globally weak.  Able to answer questions appropriately and follow simple commands.      Recent Results (from the past 24 hour(s))   POCT glucose    Collection Time: 02/09/20 12:40 AM   Result Value Ref Range    POCT Glucose 196 (H) 70 - 110 mg/dL   Magnesium    Collection Time: 02/09/20  4:09 AM   Result Value Ref Range    Magnesium 2.3 1.6 - 2.6 mg/dL   Renal function panel    Collection Time: 02/09/20  4:09 AM   Result Value Ref Range    Glucose 154 (H) 70 - 110 mg/dL    Sodium 134 (L) 136 - 145 mmol/L    Potassium 4.2 3.5 - 5.1 mmol/L    Chloride 96 95 - 110 mmol/L    CO2 22 (L) 23 - 29 mmol/L    BUN, Bld 71 (H) 6 - 20 mg/dL    Calcium 10.4 8.7 - 10.5 mg/dL    Creatinine 7.4 (H) 0.5 - 1.4 mg/dL    Albumin 2.5 (L) 3.5 - 5.2 g/dL    Phosphorus 4.7 (H) 2.7 - 4.5 mg/dL    eGFR if  8.7 (A) >60 mL/min/1.73 m^2    eGFR if non African American 7.5 (A) >60 mL/min/1.73 m^2    Anion Gap 16 8 - 16 mmol/L   CBC auto differential    Collection Time: 02/09/20  4:09 AM   Result Value Ref Range    WBC 23.75 (H) 3.90 - 12.70 K/uL    RBC 4.37 (L) 4.60 - 6.20 M/uL    Hemoglobin 12.4 (L) 14.0 - 18.0 g/dL    Hematocrit 38.2 (L) 40.0 - 54.0 %    Mean Corpuscular Volume 87 82 - 98 fL     Mean Corpuscular Hemoglobin 28.4 27.0 - 31.0 pg    Mean Corpuscular Hemoglobin Conc 32.5 32.0 - 36.0 g/dL    RDW 16.3 (H) 11.5 - 14.5 %    Platelets 240 150 - 350 K/uL    MPV 12.7 9.2 - 12.9 fL    Immature Granulocytes 0.8 (H) 0.0 - 0.5 %    Gran # (ANC) 19.6 (H) 1.8 - 7.7 K/uL    Immature Grans (Abs) 0.20 (H) 0.00 - 0.04 K/uL    Lymph # 1.4 1.0 - 4.8 K/uL    Mono # 2.4 (H) 0.3 - 1.0 K/uL    Eos # 0.1 0.0 - 0.5 K/uL    Baso # 0.04 0.00 - 0.20 K/uL    nRBC 0 0 /100 WBC    Gran% 82.5 (H) 38.0 - 73.0 %    Lymph% 6.0 (L) 18.0 - 48.0 %    Mono% 10.1 4.0 - 15.0 %    Eosinophil% 0.4 0.0 - 8.0 %    Basophil% 0.2 0.0 - 1.9 %    Platelet Estimate Appears normal     Differential Method Automated    POCT glucose    Collection Time: 02/09/20  5:18 AM   Result Value Ref Range    POCT Glucose 195 (H) 70 - 110 mg/dL   POCT glucose    Collection Time: 02/09/20 12:01 PM   Result Value Ref Range    POCT Glucose 164 (H) 70 - 110 mg/dL       Recent Labs   Lab 02/08/20  0608 02/08/20  1105 02/08/20  1408 02/09/20  0040 02/09/20  0518 02/09/20  1201   POCTGLUCOSE 201* 214* 155* 196* 195* 164*       Hemoglobin A1C   Date Value Ref Range Status   02/06/2020 4.7 4.0 - 5.6 % Final     Comment:     ADA Screening Guidelines:  5.7-6.4%  Consistent with prediabetes  >or=6.5%  Consistent with diabetes  High levels of fetal hemoglobin interfere with the HbA1C  assay. Heterozygous hemoglobin variants (HbS, HgC, etc)do  not significantly interfere with this assay.   However, presence of multiple variants may affect accuracy.     11/09/2019 4.0 4.0 - 5.6 % Final     Comment:     ADA Screening Guidelines:  5.7-6.4%  Consistent with prediabetes  >or=6.5%  Consistent with diabetes  High levels of fetal hemoglobin interfere with the HbA1C  assay. Heterozygous hemoglobin variants (HbS, HgC, etc)do  not significantly interfere with this assay.   However, presence of multiple variants may affect accuracy.     06/22/2019 4.7 4.0 - 5.6 % Final     Comment:      ADA Screening Guidelines:  5.7-6.4%  Consistent with prediabetes  >or=6.5%  Consistent with diabetes  High levels of fetal hemoglobin interfere with the HbA1C  assay. Heterozygous hemoglobin variants (HbS, HgC, etc)do  not significantly interfere with this assay.   However, presence of multiple variants may affect accuracy.     06/22/2019 4.7 4.0 - 5.6 % Final     Comment:     ADA Screening Guidelines:  5.7-6.4%  Consistent with prediabetes  >or=6.5%  Consistent with diabetes  High levels of fetal hemoglobin interfere with the HbA1C  assay. Heterozygous hemoglobin variants (HbS, HgC, etc)do  not significantly interfere with this assay.   However, presence of multiple variants may affect accuracy.          Active Hospital Problems    Diagnosis  POA    *Seizure [R56.9]  Yes    ESRD on dialysis [N18.6, Z99.2]  Not Applicable    Aphasia [R47.01]  Clinically Undetermined    Pressure injury due to medical device [T85.898A, L89.90]  Yes    Altered mental status [R41.82]  Yes    Gastrointestinal hemorrhage with hematemesis [K92.0]  Yes    GERD with esophagitis [K21.0]  Yes    Severe malnutrition [E43]  Yes     Assessment and Plan  Severe Malnutrition in the context of Social/Environmental Circumstances     Related to (etiology):  Unknown etiology     Signs and Symptoms (as evidenced by):  Energy Intake: per pt, <75% intake over the last year  Body Fat Depletion: overall subcutaneous fat loss, moderate triceps fat loss and protruding patellas.  Muscle Mass Depletion:  moderate muscle wasting of interosseous, temporal, clavicle, calves and quadriceps  Weight Loss: 24% (39 lbs) x 1 year    Recommendations     Recommendation/Intervention: 1. Should pt be cleared for po diet, recommend renal diet with texture per SLP along with Novasource ONS TID. 2. Should pt require enteral feeding, initiate Novasource renal formula at 10ml/hr and advance 10ml Q4hrs to goal rate of 40ml/hr (provides 1920kcal, 87g pro, 691ml free  water). Provide additional 500ml water flush/24 hrs. Hold for residuals >500ml.  Goals: 1. Pt to receive nutrition < 48 hrs.      Renovascular hypertension [I15.0]  Yes     Chronic    Chronic diastolic heart failure [I50.32]  Yes     Chronic     2-23-17    1 - Low normal to mildly depressed left ventricular systolic function (EF 50-55%).     2 - Right ventricular enlargement with mildly depressed systolic function.     3 - Left ventricular diastolic dysfunction.     4 - Right atrial enlargement.     5 - Severe tricuspid regurgitation.     6 - Pulmonary hypertension. The estimated PA systolic pressure is 86 mmHg.     7 - Increased central venous pressure.       Encephalopathy [G93.40]  Yes      Resolved Hospital Problems    Diagnosis Date Resolved POA    Acute metabolic encephalopathy [G93.41] 01/23/2020 Yes    ESRD on hemodialysis [N18.6, Z99.2] 01/31/2020 Not Applicable     Chronic     [facility]            Assessment and Plan for problems addressed today:      sodium chloride 0.9%   Intravenous Once    heparin (porcine)  5,000 Units Subcutaneous Q8H    insulin detemir U-100  5 Units Subcutaneous Daily    levetiracetam oral soln  250 mg Per G Tube BID    [START ON 2/10/2020] midodrine  5 mg Per NG tube Every Mon, Wed, Fri    pantoprazole  40 mg Oral Daily    prochlorperazine  5 mg Intravenous Once    sevelamer carbonate  1.6 g Per OG tube TID     acetaminophen, albuterol-ipratropium, bisacodyL, Dextrose 10% Bolus, Dextrose 10% Bolus, glucagon (human recombinant), glucose, glucose, insulin aspart U-100, ondansetron, polyethylene glycol, prochlorperazine, senna-docusate 8.6-50 mg, sodium chloride 0.9%    Acute encephalopathy: resolved  Seizures:  -Patient initially presented to ED prior to getting dialysis due to AMS and brown emesis. Had witnessed tonic clonic seizure requiring ativan shortly after getting a CT head. He was loaded with 1500 mg Keppra, intubated for airway protection, and started  on propofol for sedation. Upon physical exam, patient remained unresponsive to verbal or tactile stimuli and had upward left sided gaze with sluggish pupils. Concern for status epilepticus.    -UDS negative, alcohol level wnl  -Sepsis: Initial lactate 2.4, likely due to seizure event; repeat was 1.9. Patient received 500 ml saline in ED. Initial blood cultures, sputum culture+gram stain neg  Lumbar puncture done, CSF not convincing for meningitis, so ampicillin, vancomycin, rocephin d/c'd on 01/22. However, pt was febrile on 11/23 to 101.3. Blood cultures repeated and restarted on Vanc and Zosyn. Repeat cultures NGTD. HSV PCR neg; Acyclovir discontinued. Pt has remained afebrile. Vanc and Zosyn discontinued 01/26.   -Intracranial: patient with history of ICH with no functional deficits. Possibly multiple foci for seizure overtime. CT without acute changes, MRI brain with chronic changes and hypertensive angiopathy; no acute changes.  - PRES: Patient off cardene drip. MRI reviewed. Will follow blood pressure as patient is normally hypotensive.   -Spot EEG without evidence of current seizure. However patient already received keppra, ativan, and sedated on propofol. 24 hour EEG with L sided structural lesion and underlying epileptiform potential. Initiated 1500 mg keppra on 01/22, per Neurology recs.  -Neurology consulted. repeat EEG on 01/27 shows diffuse slowing, though no focal activity seen on EEG.   -01/28 patient's mental status is continuing to improve day to day, now following commands. 01/30 Patient is alert and following commands. Passed SBT and extubated. Breathing well though with generalized weakness as well as weak cough.   -Mental status continues to improve very gradually daily   -continue keppra 250mg BID per neuro recs   -continue aspiration precautions, fall precautions, seizure precaution  -neuro checks  -work with OT/PT/SLP, globally weak, had prolonged hospitalization     Oropharyngeal  dysphagia:  -likely secondary to acute encephalopathy and possible critical illness myopathy/neuropathy from prolonged hospital stay/ICU stay  -SLP consulted, recommending to keep patient NPO for now  -NG tube placed, continue tube feeds, nutrition consulted  -aspiration precaution  -continue to work with SLP, advance diet as tolerated  -if unable to wean off TF may need PEG which patient is agreeable to      Renovascular hypertension  -Patient on coreg at nursing home; has been compliant as given by nursing home. Patient with hypertensive emergency on admit and started Cardene drip to titrate to BP of 160-180. Off Cardene drip since 01/20.   -Goal SBP < 160 per Neurology.   -Due to soft BP, dc home carvedilol 3.125 mg BID for now  -Optimal off meds at this time, he get midodrine with piror to HD    ESRD on HD:  -Pt undergoes HD MWF outpatient  -Consult nephrology to continue HD inpatient   -Renally dose medications and avoid nephrotoxic medications to preserve residual renal function   -Strict I/O's and daily weights  -Continue to monitor renal function with daily labs     Chronic diastolic heart failure  -Last echo done at [facility] [place] 8/2/19, EF>55%, bi-atrial enlargement  -No signs of exacerbation   -Continue to monitor on telemetry  -Monitor intake and output and obtain daily STANDING weights  -Fluid restriction to 1.5L per day and cardiac/renal diet with salt restriction  -Supplemental O2 to keep Spo2 >90%    Severe malnutrition:  -Nutrition consulted  -Novasource @ 44 mL/hr to provide 1440 kcals, 65 g of protein, 516 mL fluid.   -Continue TFs     Hematemesis:  -Patient with reports of coffee ground emesis prior to transport to ED. In ED, dark brown contents suctioned from stomach. Hemoglobin remains stable and pt does not have any HD instability. Patient is well known to GI. His most recent scope was in November that just showed esophagitis similar to his previous EGD.   -GI consulted and appreciate recommendations.  No scope indicated at this time  -continue protonix po daily  -continue to monitor H&H daily  -transfuse for hemoglobin under 7  -Nursing / Phlebotomy to use pediatric tubes when drawing labs to minimize iatrogenic anemia and blood loss.      Hx GERD with esophagitis  -Continue Protonix  -Follow up with GI out patient           Discharge plan and follow up: DC to SNF once medically stable     Jaskaran Colon MD  Hospital Medicine Staff  [phone] pager

## 2020-02-09 NOTE — NURSING
Straight cath performed for urinalysis/urine reflex, did not provide urine for requested lab. Notified MD Isaiah.

## 2020-02-10 NOTE — NURSING
Pt return to room via bed from HD. Restarted TF @44. Pt is also having hiccups. Dr. Colon at bedside. Pt denies pain and sob. Will continue to monitor.

## 2020-02-10 NOTE — PT/OT/SLP PROGRESS
Speech Language Pathology Treatment    Patient Name:  Vaughn Retana   MRN:  [id number]   955/955 A    Admitting Diagnosis: Seizure    Recommendations:                 General Recommendations:  Dysphagia therapy  Diet recommendations:  NPO, Liquid Diet Level: NPO   Aspiration Precautions: Strict aspiration precautions   General Precautions: Standard, aspiration, fall, NPO, seizure  Communication strategies:  go to room if call light pushed    Subjective     Verbalizations unappreciated this session.     Pain/Comfort:  · Pain Rating 1: 0/10  · Pain Rating Post-Intervention 1: 0/10    Objective:     Has the patient been evaluated by SLP for swallowing?   Yes  Keep patient NPO? Yes   Current Respiratory Status: room air      Upon entry, pt in and out of light sleep state. NG tube visualized to be dislodged. NSG immediately notified, arrived to bedside to re-place NG tube. When re-placed, severely wet, reflexive groaning sound appreciated. Across multiple verbal prompts provided to swallow his oral secretions, swallow initiation eventually appreciated x1. However additional reflexive groaning sound produced on command for observation of vocal quality after volitional swallow completion remarkable for ongoing severely wet vocal quality. Although NG tube re-placed and HOB raised, as well as extensive verbal and tactile stimulation provided, pt remained significantly lethargic. Across PO trials thin water via tsp x2-3, attempt to initiate bolus retrieval unappreciated despite repetitive verbal prompting and tactile cueing provided as, tsp tip touching pt's lower labial surface. Pt observed to immediately transition to light sleep state upon cessation of attempts to provide him with PO trials. Therefore although education provided re: ongoing SLP POC, indication of pt's understanding unappreciated. No further questions.     Assessment:     Vaughn Retana is a 54 y.o. male with an SLP diagnosis of dysphagia.     Goals:    Multidisciplinary Problems     SLP Goals        Problem: SLP Goal    Goal Priority Disciplines Outcome   SLP Goal     SLP Ongoing, Progressing   Description:  Speech Language Pathology  Goals expected to be met by 2/17:  1. Pt will participate in ongoing assessment of swallow to determine safest, least restrictive diet.                    Plan:     · Patient to be seen:  3 x/week   · Plan of Care expires:  02/29/20  · Plan of Care reviewed with:  patient   · SLP Follow-Up:  Yes       Discharge recommendations:  nursing facility, skilled     Time Tracking:     SLP Treatment Date:   02/10/20  Speech Start Time:  1533  Speech Stop Time:  1545     Speech Total Time (min):  12 min    Billable Minutes: Treatment Swallowing Dysfunction 12    NALDO Holden, CCC-SLP  [phone]  2/10/2020

## 2020-02-10 NOTE — PROCEDURES
EXTENDED  ELECTROENCEPHALOGRAM  REPORT    DATE OF SERVICE:  02/23/2020  EEG NUMBER: [id number]  REQUESTED BY:  Dr. Handley  LOCATION OF SERVICE:  60  METHODOLOGY   Electroencephalographic (EEG) recording is with electrodes placed according to the International 10-20 placement system.  Thirty two (32) channels of digital signal (sampling rate of 512/sec) including T1 and T2 was simultaneously recorded from the scalp and may include  EKG, EMG, and/or eye monitors.  Recording band pass was 0.1 to 512 hz.  Digital video recording of the patient is simultaneously recorded with the EEG.  The patient is instructed report clinical symptoms which may occur during the recording session.  EEG and video recording is stored and archived in digital format.  Activation procedures which include photic stimulation, hyperventilation and instructing patients to perform simple task are done in selected patients.   The EEG is displayed on a monitor screen and can be reviewed using different montages.  Computer assisted analysis is employed to detect spike and electrographic seizure activity.   The entire record is submitted for computer analysis.  The entire recording is visually reviewed and the times identified by computer analysis as being spikes or seizures are reviewed again.  Compresses spectral analysis (CSA) is also performed on the activity recorded from each individual channel.  This is displayed as a power display of frequencies from 0 to 30 Hz over time.   The CSA is reviewed looking for asymmetries in power between homologous areas of the scalp and then compared with the original EEG recording.     [employer] software was also utilized in the review of this study.  This software suite analyzes the EEG recording in multiple domains.  Coherence and rhythmicity is computed to identify EEG sections which may contain organized seizures.  Each channel undergoes analysis to detect presence of spike and sharp waves which have special  and morphological characteristic of epileptic activity.  The routine EEG recording is converted from spacial into frequency domain.  This is then displayed comparing homologous areas to identify areas of significant asymmetry.  Algorithm to identify non-cortically generated artifact is used to separate eye movement, EMG and other artifact from the EEG.      RECORDING TIMES  Start on 02/03/2020 at 7:04 a.m.  Stop on 02/03/2020 at 1:34 p.m.    A total of 6 hr and 30 min of EEG recording was obtained.  Patient was moving around spontaneously and breathing on his own.      EEG FINGINGS  Waking state     Background was very disorganized.  A a irregular 6-7 hertz theta was noted in the occipital parietal posterior temporal and central regions bilaterally.  There is some slower theta also noted in the 3-4 hertz range.  Mid mid-range beta was noted in the mid and frontal regions.  No significant lateralized or focal findings were noted no spike or sharp wave activity was present.  Sleep state -   Sleep was characterized by high-voltage fairly rhythmic but soft tooth delta 1-1 and half hertz seen bilaterally and maximal in the frontal region.  There is intermixed slower theta and mid-range beta noted diffusely.  Again the background remained symmetrical without lateralized or focal features.  No spike or sharp wave activity was recorded.     Activation procedures:   Photic stimulation -   Not performed  Hyperventilation -   Not performed    Cognitive testing:   Not performed  Cardiac Monitor:   Single lead EKG was obtained and normal heart rate noted    IMPRESSION:  Abnormal EEG - both the waking and sleeping background was slow and disorganized in the and did not show did typical cycling of sleep as expected.  This and indicates presence of a moderate to marked nonfocal nonspecific encephalopathy and there is no evidence for an irritative process.  The pattern does not suggest a specific etiology.

## 2020-02-10 NOTE — PROCEDURES
EXTENDED  ELECTROENCEPHALOGRAM  REPORT    DATE OF SERVICE:  02/04/2020  EEG NUMBER: FH [id number] the  REQUESTED BY:  Dr. Handley  LOCATION OF SERVICE:  6092  METHODOLOGY   Electroencephalographic (EEG) recording is with electrodes placed according to the International 10-20 placement system.  Thirty two (32) channels of digital signal (sampling rate of 512/sec) including T1 and T2 was simultaneously recorded from the scalp and may include  EKG, EMG, and/or eye monitors.  Recording band pass was 0.1 to 512 hz.  Digital video recording of the patient is simultaneously recorded with the EEG.  The patient is instructed report clinical symptoms which may occur during the recording session.  EEG and video recording is stored and archived in digital format.  Activation procedures which include photic stimulation, hyperventilation and instructing patients to perform simple task are done in selected patients.   The EEG is displayed on a monitor screen and can be reviewed using different montages.  Computer assisted analysis is employed to detect spike and electrographic seizure activity.   The entire record is submitted for computer analysis.  The entire recording is visually reviewed and the times identified by computer analysis as being spikes or seizures are reviewed again.  Compresses spectral analysis (CSA) is also performed on the activity recorded from each individual channel.  This is displayed as a power display of frequencies from 0 to 30 Hz over time.   The CSA is reviewed looking for asymmetries in power between homologous areas of the scalp and then compared with the original EEG recording.     Advanced Brain Monitoring software was also utilized in the review of this study.  This software suite analyzes the EEG recording in multiple domains.  Coherence and rhythmicity is computed to identify EEG sections which may contain organized seizures.  Each channel undergoes analysis to detect presence of spike and sharp waves which have  special and morphological characteristic of epileptic activity.  The routine EEG recording is converted from spacial into frequency domain.  This is then displayed comparing homologous areas to identify areas of significant asymmetry.  Algorithm to identify non-cortically generated artifact is used to separate eye movement, EMG and other artifact from the EEG.      RECORDING TIMES  Start on 02/04/2020 at 8:28 a.m.  Stop on 02/05/2020 at 07:00 a.m.    A total of 22 hr and 28 min of EEG recording was obtained.  Patient was moving around spontaneously and breathing on his own.      EEG FINGINGS  Waking state     Background was very disorganized.  Background consisted of a fairly rhythmic 7 to 7-1/2 activity thenoted in the occipital, parietal,  posterior temporal and central regions bilaterally.  Some slower theta was intermixed at times and infrequently a bilaterally symmetrical 1-1/2 hertz delta was seen which head is highest amplitude in the frontal regions.  The delta component had improved considerably from the previous day.  No lateralized or focal findings were noted and no spike or sharp wave activity was present. The  Sleep state -   During sleep waking background subsided the and a polymorphic mixture of delta and theta was seen bilaterally which was punctuated for brief runs of 8-10 hertz alpha frequency.  These patterns were symmetrical over both hemispheres and had no anterior-posterior gradient.  No spike or sharp wave activity was recorded recorded.  Activation procedures:   Photic stimulation -   Not performed  Hyperventilation -   Not performed    Cognitive testing:   Not performed  Cardiac Monitor:   Single lead EKG was obtained and normal heart rate noted    IMPRESSION:  Abnormal EEG - both the waking and sleeping background was slow and disorganized indicating the presence of a mild to moderate generalized encephalopathy.  The pattern however had improved from the previous day is faster frequencies  were not present. Pattern still does not suggest a specific etiology.  No focal changes nor epileptic activity was seen.

## 2020-02-10 NOTE — PROGRESS NOTES
[facility] HEMODIALYSIS NOTE     Patient currently on hemodialysis for removal of uremic toxins and volume.     Patient seen and evaluated on hemodialysis, tolerating treatment, see HD flowsheet for vitals and assessments.      Ultrafiltration goal is 1L as tolerated      Labs have been reviewed and the dialysate bath has been adjusted.     Assessment/Plan:    -Patient seen on HD, tolerating treatment well, w/o complaints   -Keep map >65  -Renal diet  -Continue renvela   -Strict I/O's and daily weights  -Daily renal function panels  -Maintain Hgb >7.0  -Will continue to follow while inpatient       VANITA Rodas, AGNP-C  Nephrology  Pager:  [phone]

## 2020-02-10 NOTE — PLAN OF CARE
02/10/20 1523   Discharge Reassessment   Assessment Type Discharge Planning Reassessment   Do you have any problems affording any of your prescribed medications? No   Discharge Plan A Skilled Nursing Facility   Discharge Plan B Return to Nursing Home   DME Needed Upon Discharge  none   Patient choice form signed by patient/caregiver N/A   Anticipated Discharge Disposition SNF   Can the patient/caregiver answer the patient profile reliably? No, cognitively impaired   How does the patient rate their overall health at the present time? Poor   Describe the patient's ability to walk at the present time. Does not walk or unable to take any steps at all   How often would a person be available to care for the patient? Often   Number of comorbid conditions (as recorded on the chart) Five or more   Post-Acute Status   Post-Acute Authorization Placement   Post-Acute Placement Status Awaiting Internal Medical Clearance   Discharge Delays None known at this time     Milagro Madrid RN  Case Management  Ext: [phone]  02/10/2020  3:24 PM

## 2020-02-10 NOTE — PLAN OF CARE
POC reviewed with patient. VSS. NG tube in place. No signs of distress noted during shift. Safety measures maintained. Bed alarm activated, bed locked in lowest position, side rails up x 2.  Call light within reach. Will continue to monitor.    Problem: Adult Inpatient Plan of Care  Goal: Plan of Care Review  Outcome: Ongoing, Progressing     Problem: Restraint, Nonbehavioral (Nonviolent)  Goal: Personal Dignity and Safety Maintained  Outcome: Ongoing, Progressing

## 2020-02-10 NOTE — PROGRESS NOTES
Received pt to [facility]AD[facility] in his bed.  Pt with NGT clamped, disoriented x4.  Restraints to thuy wrist.  Dialysis started via right arm fistula with 15 gauge needles.  Pt tolerated without difficulty.

## 2020-02-10 NOTE — PROGRESS NOTES
Pt completed 3.5 hours of dialysis via right arm fistula.  Needles removed and pressure held for 5-7 minutes.  Hemostasis achieved. Net even due to hypotension, NP at bedside aware. Report called to nurse and pt returned to room by hospital escort.

## 2020-02-10 NOTE — NURSING
Notified MD Isaiah that pts BM contained bright red blood. Verbal instructions to continue to monitor.    Pneumonia, unspecified organism

## 2020-02-10 NOTE — PLAN OF CARE
Recommend ongoing NPO with cont'd NG tube for all nutrition, hydration, medication. Negligible progress toward SLP goals. Pt with ongoing severely dec'd oral secretion management. Team may wish to consider longer term alternate source nutrition, hydration, medication.     NALDO Holden, CCC-SLP  [phone]  2/10/2020

## 2020-02-10 NOTE — PT/OT/SLP PROGRESS
Occupational Therapy      Patient Name:  Vaughn Retana   MRN:  [id number]    Patient not seen today secondary to 2 AM attempts pt off the floor at hemodialysis. Upon OT third attempt in PM, pt reporting too fatigued after dialysis to participate in therapy and wanting to sleep. Will follow-up 2/11/2020.    RANDELL Davison  2/10/2020

## 2020-02-10 NOTE — PROGRESS NOTES
Hospital Medicine   Progress note      [place] [facility] Jaskaran Colon MD   Admit Date: 1/20/2020   Hospital Day: 21  JUAN: 2/11/2020   Code status: Full Code   Principal Problem: Seizure     Summary: Patient is a 54 year old male with extensive medical history including ESRD on dialysis MWF (has not received it today), L below knee amputation 2/2 osteomyelitis, dCHF (last echo 8/2/19 [facility] [place]), GERD, h/o multiple ICH w/o residual deficits, and multiple instances of GI bleed (last EGD 11/12/19, esophagitis) who presents to ED [facility] due to altered mental status. From NH report, nursing home staff went to wake the patient for his morning dialysis. He was confused, lethargic, had a moderate amount of brown colored emesis in his trash can. Patient last got dialysis on Friday. Patient is normally able to ambulate on his own and is alert and oriented; nursing staff reports that he appeared normal day before admission. Patient admitted to critical care with concerns of new onset seizures and s/p intubation for airway protection. Patient was started on vanc, rocephin, ampicillin, and acyclovir to cover for meningitis. Patient had spot EEG while on propofol in ED which did not show seizure activity. MRI done showed chronic changes but no acute abnormalities. Lumbar puncture was done after MRI. CSF labs were not convincing for bacterial or viral meningitis and antibiotics were weaned down. Continuous EEG was done after propofol was stopped which showed epileptiform discharges. Per epilepsy, patient was given another dose of Keppra. Nephrology consulted and started HD. Pt became febrile the night of 01/22; blood cultures repeated and restarted on Vanc and Zosyn. HSV PCR neg so Acyclovir discontinued.  Neurology was consulted and pt was continued on Keppra but dose was decreased. 01/27 Patient's neuro exam is somewhat improved from day prior per nurse - opening eyes spontaneously and occasionally tracking  "movements, blinking to threat.  passed SBT today, plan to extubate . Repeat 24 hr EEG shows "multifocal slowing consistent with multiple areas of focal cortical dysfunction and occasional epileptiform discharges in the left frontotemporal region consistent with a potential seizure focus in this area" with no electrographic seizures.  Originally planned for extubation, but was deferred in light of BRBPR ~08:30 per nurse. GI consulted, no scope indicated at this time. Otherwise mental status improving in small increments daily.  Patient successfully extubated, though very weak. Unable to clear secretions effectively at this time, weak cough. - lethargic, EEG was done with no seizure activity, Keppra dose was decreased.   - pt was more awake after decreasing Keppra dose further to 250mg BID. Pt able to answer simple questions appropriate and follow simple commands. SLP following but pt continues to fail swallow study. NGT placed. Pt started on tube feeds and tolerating well. He continues to be lethargic at this time and requiring TF through NGT.    Interval hx:   Pt was seen and examined at bedside. Alert and oriented x 3. Mentation gradually improving, improved from yesterday. Able to state his his name, , place, month and year. Continues to be weakness. SLP hasn't seen patient for the last several days now. Will need to come up with deciding regarding PEG soon, he has been TF via NGT for about a week now.      ROS (Positive in Bold, otherwise negative)  Constitutional: fever, chills, night sweats. Generalized weakness   CV: chest pain, edema, palpitations  Resp: SOB, cough, sputum production  GI: dysphagia, changes in appetite, NVDC, pain, melena, hematochezia, GERD, hematemesis  : Dysuria, hematuria, urinary urgency, frequency  MSK: arthralgia/myalgia, joint swelling  Neuro/Psych: anxiety, depression, seizure     PEx   Temp:  [96 °F (35.6 °C)-98.4 °F (36.9 °C)]   Pulse:  [] "   Resp:  [16-20]   BP: ()/(39-77)   SpO2:  [93 %-100 %]      I & O (Last 24H):     Intake/Output Summary (Last 24 hours) at 2/10/2020 1525  Last data filed at 2/9/2020 1800  Gross per 24 hour   Intake 150 ml   Output --   Net 150 ml       General:  male  in no acute distress. More alert today. Appears chronically ill. Resting in bed. Cooperative.  Aox3  HEENT: NCAT. PERRL. EOMI. Sclera Anicteric. NG tube in place  CVS: RRR. Normal S1 S2. No murmurs  Pulm: CTAB. Normal respiratory effort. No wheezes, rhonchi, or crackles.  Abdomen: Soft. Non-distended. No tenderness to palpation. No rebound or guarding. +BS.  Extremities: No edema. No cyanosis. Full ROM.  Globally weak. Left BKA  Neuro: Alert, oriented x 3, Spont mvt of all extremities with no focal deficits noted. Globally weak.  Able to answer questions appropriately and follow simple commands.      Recent Results (from the past 24 hour(s))   POCT glucose    Collection Time: 02/09/20  6:03 PM   Result Value Ref Range    POCT Glucose 175 (H) 70 - 110 mg/dL   POCT glucose    Collection Time: 02/09/20 11:52 PM   Result Value Ref Range    POCT Glucose 153 (H) 70 - 110 mg/dL   Magnesium    Collection Time: 02/10/20  3:47 AM   Result Value Ref Range    Magnesium 2.5 1.6 - 2.6 mg/dL   Renal function panel    Collection Time: 02/10/20  3:47 AM   Result Value Ref Range    Glucose 168 (H) 70 - 110 mg/dL    Sodium 133 (L) 136 - 145 mmol/L    Potassium 4.0 3.5 - 5.1 mmol/L    Chloride 94 (L) 95 - 110 mmol/L    CO2 21 (L) 23 - 29 mmol/L    BUN, Bld 102 (H) 6 - 20 mg/dL    Calcium 10.2 8.7 - 10.5 mg/dL    Creatinine 9.1 (H) 0.5 - 1.4 mg/dL    Albumin 2.2 (L) 3.5 - 5.2 g/dL    Phosphorus 3.8 2.7 - 4.5 mg/dL    eGFR if African American 6.8 (A) >60 mL/min/1.73 m^2    eGFR if non African American 5.8 (A) >60 mL/min/1.73 m^2    Anion Gap 18 (H) 8 - 16 mmol/L   POCT glucose    Collection Time: 02/10/20  5:09 AM   Result Value Ref Range    POCT Glucose 213 (H) 70  - 110 mg/dL   CBC auto differential    Collection Time: 02/10/20  7:50 AM   Result Value Ref Range    WBC 14.47 (H) 3.90 - 12.70 K/uL    RBC 3.89 (L) 4.60 - 6.20 M/uL    Hemoglobin 11.0 (L) 14.0 - 18.0 g/dL    Hematocrit 34.2 (L) 40.0 - 54.0 %    Mean Corpuscular Volume 88 82 - 98 fL    Mean Corpuscular Hemoglobin 28.3 27.0 - 31.0 pg    Mean Corpuscular Hemoglobin Conc 32.2 32.0 - 36.0 g/dL    RDW 16.2 (H) 11.5 - 14.5 %    Platelets 250 150 - 350 K/uL    MPV 11.6 9.2 - 12.9 fL    Immature Granulocytes 0.7 (H) 0.0 - 0.5 %    Gran # (ANC) 12.0 (H) 1.8 - 7.7 K/uL    Immature Grans (Abs) 0.10 (H) 0.00 - 0.04 K/uL    Lymph # 1.1 1.0 - 4.8 K/uL    Mono # 1.1 (H) 0.3 - 1.0 K/uL    Eos # 0.2 0.0 - 0.5 K/uL    Baso # 0.02 0.00 - 0.20 K/uL    nRBC 0 0 /100 WBC    Gran% 82.7 (H) 38.0 - 73.0 %    Lymph% 7.4 (L) 18.0 - 48.0 %    Mono% 7.5 4.0 - 15.0 %    Eosinophil% 1.6 0.0 - 8.0 %    Basophil% 0.1 0.0 - 1.9 %    Differential Method Automated    POCT glucose    Collection Time: 02/10/20  7:53 AM   Result Value Ref Range    POCT Glucose 180 (H) 70 - 110 mg/dL   POCT glucose    Collection Time: 02/10/20 10:43 AM   Result Value Ref Range    POCT Glucose 92 70 - 110 mg/dL       Recent Labs   Lab 02/09/20  1201 02/09/20  1803 02/09/20  2352 02/10/20  0509 02/10/20  0753 02/10/20  1043   POCTGLUCOSE 164* 175* 153* 213* 180* 92       Hemoglobin A1C   Date Value Ref Range Status   02/06/2020 4.7 4.0 - 5.6 % Final     Comment:     ADA Screening Guidelines:  5.7-6.4%  Consistent with prediabetes  >or=6.5%  Consistent with diabetes  High levels of fetal hemoglobin interfere with the HbA1C  assay. Heterozygous hemoglobin variants (HbS, HgC, etc)do  not significantly interfere with this assay.   However, presence of multiple variants may affect accuracy.     11/09/2019 4.0 4.0 - 5.6 % Final     Comment:     ADA Screening Guidelines:  5.7-6.4%  Consistent with prediabetes  >or=6.5%  Consistent with diabetes  High levels of fetal hemoglobin  interfere with the HbA1C  assay. Heterozygous hemoglobin variants (HbS, HgC, etc)do  not significantly interfere with this assay.   However, presence of multiple variants may affect accuracy.     06/22/2019 4.7 4.0 - 5.6 % Final     Comment:     ADA Screening Guidelines:  5.7-6.4%  Consistent with prediabetes  >or=6.5%  Consistent with diabetes  High levels of fetal hemoglobin interfere with the HbA1C  assay. Heterozygous hemoglobin variants (HbS, HgC, etc)do  not significantly interfere with this assay.   However, presence of multiple variants may affect accuracy.     06/22/2019 4.7 4.0 - 5.6 % Final     Comment:     ADA Screening Guidelines:  5.7-6.4%  Consistent with prediabetes  >or=6.5%  Consistent with diabetes  High levels of fetal hemoglobin interfere with the HbA1C  assay. Heterozygous hemoglobin variants (HbS, HgC, etc)do  not significantly interfere with this assay.   However, presence of multiple variants may affect accuracy.          Active Hospital Problems    Diagnosis  POA    *Seizure [R56.9]  Yes    ESRD on dialysis [N18.6, Z99.2]  Not Applicable    Aphasia [R47.01]  Clinically Undetermined    Pressure injury due to medical device [T85.898A, L89.90]  Yes    Altered mental status [R41.82]  Yes    Gastrointestinal hemorrhage with hematemesis [K92.0]  Yes    GERD with esophagitis [K21.0]  Yes    Severe malnutrition [E43]  Yes     Assessment and Plan  Severe Malnutrition in the context of Social/Environmental Circumstances     Related to (etiology):  Unknown etiology     Signs and Symptoms (as evidenced by):  Energy Intake: per pt, <75% intake over the last year  Body Fat Depletion: overall subcutaneous fat loss, moderate triceps fat loss and protruding patellas.  Muscle Mass Depletion:  moderate muscle wasting of interosseous, temporal, clavicle, calves and quadriceps  Weight Loss: 24% (39 lbs) x 1 year    Recommendations     Recommendation/Intervention: 1. Should pt be cleared for po diet,  recommend renal diet with texture per SLP along with Novasource ONS TID. 2. Should pt require enteral feeding, initiate Novasource renal formula at 10ml/hr and advance 10ml Q4hrs to goal rate of 40ml/hr (provides 1920kcal, 87g pro, 691ml free water). Provide additional 500ml water flush/24 hrs. Hold for residuals >500ml.  Goals: 1. Pt to receive nutrition < 48 hrs.      Renovascular hypertension [I15.0]  Yes     Chronic    Chronic diastolic heart failure [I50.32]  Yes     Chronic     2-23-17    1 - Low normal to mildly depressed left ventricular systolic function (EF 50-55%).     2 - Right ventricular enlargement with mildly depressed systolic function.     3 - Left ventricular diastolic dysfunction.     4 - Right atrial enlargement.     5 - Severe tricuspid regurgitation.     6 - Pulmonary hypertension. The estimated PA systolic pressure is 86 mmHg.     7 - Increased central venous pressure.       Encephalopathy [G93.40]  Yes      Resolved Hospital Problems    Diagnosis Date Resolved POA    Acute metabolic encephalopathy [G93.41] 01/23/2020 Yes    ESRD on hemodialysis [N18.6, Z99.2] 01/31/2020 Not Applicable     Chronic     MWF at [facility]            Assessment and Plan for problems addressed today:      sodium chloride 0.9%   Intravenous Once    heparin (porcine)  5,000 Units Subcutaneous Q8H    insulin detemir U-100  7 Units Subcutaneous Daily    levetiracetam oral soln  250 mg Per G Tube BID    midodrine  5 mg Per NG tube Every Mon, Wed, Fri    pantoprazole  40 mg Oral Daily    polyethylene glycol  17 g Per NG tube Daily    sevelamer carbonate  1.6 g Per OG tube TID     acetaminophen, albuterol-ipratropium, bisacodyL, Dextrose 10% Bolus, Dextrose 10% Bolus, glucagon (human recombinant), glucose, glucose, insulin aspart U-100, ondansetron, polyethylene glycol, prochlorperazine, senna-docusate 8.6-50 mg, sodium chloride 0.9%    Acute encephalopathy: resolved  Seizures:  -Patient initially presented  to ED prior to getting dialysis due to AMS and brown emesis. Had witnessed tonic clonic seizure requiring ativan shortly after getting a CT head. He was loaded with 1500 mg Keppra, intubated for airway protection, and started on propofol for sedation. Upon physical exam, patient remained unresponsive to verbal or tactile stimuli and had upward left sided gaze with sluggish pupils. Concern for status epilepticus.    -UDS negative, alcohol level wnl  -Sepsis: Initial lactate 2.4, likely due to seizure event; repeat was 1.9. Patient received 500 ml saline in ED. Initial blood cultures, sputum culture+gram stain neg  Lumbar puncture done, CSF not convincing for meningitis, so ampicillin, vancomycin, rocephin d/c'd on 01/22. However, pt was febrile on 11/23 to 101.3. Blood cultures repeated and restarted on Vanc and Zosyn. Repeat cultures NGTD. HSV PCR neg; Acyclovir discontinued. Pt has remained afebrile. Vanc and Zosyn discontinued 01/26.   -Intracranial: patient with history of ICH with no functional deficits. Possibly multiple foci for seizure overtime. CT without acute changes, MRI brain with chronic changes and hypertensive angiopathy; no acute changes.  - PRES: Patient off cardene drip. MRI reviewed. Will follow blood pressure as patient is normally hypotensive.   -Spot EEG without evidence of current seizure. However patient already received keppra, ativan, and sedated on propofol. 24 hour EEG with L sided structural lesion and underlying epileptiform potential. Initiated 1500 mg keppra on 01/22, per Neurology recs.  -Neurology consulted. repeat EEG on 01/27 shows diffuse slowing, though no focal activity seen on EEG.   -01/28 patient's mental status is continuing to improve day to day, now following commands. 01/30 Patient is alert and following commands. Passed SBT and extubated. Breathing well though with generalized weakness as well as weak cough.   -Mental status continues to improve very gradually daily    -continue keppra 250mg BID per neuro recs   -continue aspiration precautions, fall precautions, seizure precaution  -neuro checks  -work with OT/PT/SLP, globally weak, had prolonged hospitalization and ICU stay, possible critical illness myopathy/neuropathy present    Oropharyngeal dysphagia:  -likely secondary to acute encephalopathy and possible critical illness myopathy/neuropathy from prolonged hospital stay/ICU stay  -SLP consulted, recommending to keep patient NPO for now  -NG tube placed, continue tube feeds, nutrition consulted  -aspiration precaution  -continue to work with SLP, advance diet as tolerated  -if unable to wean off TF may need PEG soon which patient is agreeable to      Renovascular hypertension  -Patient on coreg at nursing home; has been compliant as given by nursing home. Patient with hypertensive emergency on admit and started Cardene drip to titrate to BP of 160-180. Off Cardene drip since 01/20.   -Goal SBP < 160 per Neurology.   -Due to soft BP, dc home carvedilol 3.125 mg BID for now  -Optimal off meds at this time, he get midodrine with piror to HD    ESRD on HD:  -Pt undergoes HD MWF outpatient  -Consult nephrology to continue HD inpatient   -Renally dose medications and avoid nephrotoxic medications to preserve residual renal function   -Strict I/O's and daily weights  -Continue to monitor renal function with daily labs     Chronic diastolic heart failure  -Last echo done at [facility] 8/2/19, EF>55%, bi-atrial enlargement  -No signs of exacerbation, appears dry  -Continue to monitor on telemetry  -Monitor intake and output and obtain daily STANDING weights  -Fluid restriction to 1.5L per day and cardiac/renal diet with salt restriction  -Supplemental O2 to keep Spo2 >90%    Severe malnutrition:  -Nutrition consulted  -Novasource @ 44 mL/hr to provide 1440 kcals, 65 g of protein, 516 mL fluid.   -Continue TFs     Hematemesis:  -Patient with reports of coffee ground emesis prior to  transport to ED. In ED, dark brown contents suctioned from stomach. Hemoglobin remains stable and pt does not have any HD instability. Patient is well known to GI. His most recent scope was in November that just showed esophagitis similar to his previous EGD.   -GI consulted and appreciate recommendations. No scope indicated at this time  -continue protonix po daily  -continue to monitor H&H daily  -transfuse for hemoglobin under 7  -Nursing / Phlebotomy to use pediatric tubes when drawing labs to minimize iatrogenic anemia and blood loss.      Hx GERD with esophagitis  -Continue Protonix  -Follow up with GI out patient           Discharge plan and follow up: DC to SNF once medically stable    Jaskaran Colon MD  Hospital Medicine Staff  [phone] pager

## 2020-02-10 NOTE — PT/OT/SLP PROGRESS
Physical Therapy      Patient Name:  Vaughn Retana   MRN:  [id number]    Patient not seen today secondary to Patient fatigue, Unavailable (Comment)(Pt off floor in HD for first attempt. 2nd attempt pt refused 2/2 fatigue). Will follow-up 2/11/2020 per PT POC, pt availability, and as medically appropriate.    Lauren Issa, PT

## 2020-02-11 NOTE — PROGRESS NOTES
Hospital Medicine   Progress note      [place] [facility] [place] [facility] [place] Jaskaran Colon MD   Admit Date: 1/20/2020   Hospital Day: 22  JUAN: 2/14/2020   Code status: Full Code   Principal Problem: Seizure     Summary: Patient is a 54 year old male with extensive medical history including ESRD on dialysis MWF (has not received it today), L below knee amputation 2/2 osteomyelitis, dCHF (last echo 8/2/19 [place]), GERD, h/o multiple ICH w/o residual deficits, and multiple instances of GI bleed (last EGD 11/12/19, esophagitis) who presents to ED [facility] nursing home due to altered mental status. From NH report, nursing home staff went to wake the patient for his morning dialysis. He was confused, lethargic, had a moderate amount of brown colored emesis in his trash can. Patient last got dialysis on Friday. Patient is normally able to ambulate on his own and is alert and oriented; nursing staff reports that he appeared normal day before admission. Patient admitted to critical care with concerns of new onset seizures and s/p intubation for airway protection. Patient was started on vanc, rocephin, ampicillin, and acyclovir to cover for meningitis. Patient had spot EEG while on propofol in ED which did not show seizure activity. MRI done showed chronic changes but no acute abnormalities. Lumbar puncture was done after MRI. CSF labs were not convincing for bacterial or viral meningitis and antibiotics were weaned down. Continuous EEG was done after propofol was stopped which showed epileptiform discharges. Per epilepsy, patient was given another dose of Keppra. Nephrology consulted and started HD. Pt became febrile the night of 01/22; blood cultures repeated and restarted on Vanc and Zosyn. HSV PCR neg so Acyclovir discontinued.  Neurology was consulted and pt was continued on Keppra but dose was decreased. 01/27 Patient's neuro exam is somewhat improved from day prior per nurse - opening eyes spontaneously and occasionally tracking  "movements, blinking to threat.  passed SBT today, plan to extubate . Repeat 24 hr EEG shows "multifocal slowing consistent with multiple areas of focal cortical dysfunction and occasional epileptiform discharges in the left frontotemporal region consistent with a potential seizure focus in this area" with no electrographic seizures.  Originally planned for extubation, but was deferred in light of BRBPR ~08:30 per nurse. GI consulted, no scope indicated at this time. Otherwise mental status improving in small increments daily.  Patient successfully extubated, though very weak. Unable to clear secretions effectively at this time, weak cough. - lethargic, EEG was done with no seizure activity, Keppra dose was decreased.   - pt was more awake after decreasing Keppra dose further to 250mg BID. Pt able to answer simple questions appropriate and follow simple commands. SLP following but pt continues to fail swallow study. NGT placed. Pt started on tube feeds and tolerating well. He continues to be lethargic at this time and requiring TF through NGT. At this time he will require PEG as he has had NGT TF for several days, with no improvement. IR consult placed. Plan for PEG .     Eventual d/c plan will be SNF with PEG and TFs.    Interval hx:   Pt was seen and examined at bedside. Alert and oriented x 3. Mentation stable. Able to state his his name, , place, month and year. Continues to be weakness. SLP rec NPO. IR consulted for PEG. He is agreeable. Stop TF tn and admin barium as per IR. PEG planned for tomorrow.      ROS (Positive in Bold, otherwise negative)  Constitutional: fever, chills, night sweats. Generalized weakness   CV: chest pain, edema, palpitations  Resp: SOB, cough, sputum production  GI: dysphagia, changes in appetite, NVDC, pain, melena, hematochezia, GERD, hematemesis  : Dysuria, hematuria, urinary urgency, frequency  MSK: arthralgia/myalgia, joint swelling  Neuro/Psych: " confusion (rseolved), anxiety, depression, seizure     PEx   Temp:  [96.5 °F (35.8 °C)-99.5 °F (37.5 °C)]   Pulse:  []   Resp:  [18-20]   BP: (113-127)/(52-77)   SpO2:  [95 %-99 %]      I & O (Last 24H):     Intake/Output Summary (Last 24 hours) at 2/11/2020 1754  Last data filed at 2/11/2020 1600  Gross per 24 hour   Intake 900 ml   Output 3 ml   Net 897 ml       General:  male  in no acute distress. Alert. Appears chronically ill. Resting in bed. Cooperative.  Aox3  HEENT: NCAT. PERRL. EOMI. Sclera Anicteric. NG tube in place  CVS: RRR. Normal S1 S2. No murmurs  Pulm: CTAB. Normal respiratory effort. No wheezes, rhonchi, or crackles.  Abdomen: Soft. Non-distended. No tenderness to palpation. No rebound or guarding. +BS.  Extremities: No edema. No cyanosis. Full ROM.  Globally weak. Left BKA  Neuro: Alert, oriented x 3, Spont mvt of all extremities with no focal deficits noted. Globally weak.  Able to answer questions appropriately and follow simple commands.      Recent Results (from the past 24 hour(s))   POCT glucose    Collection Time: 02/10/20 11:28 PM   Result Value Ref Range    POCT Glucose 117 (H) 70 - 110 mg/dL   Magnesium    Collection Time: 02/11/20  3:23 AM   Result Value Ref Range    Magnesium 2.1 1.6 - 2.6 mg/dL   Renal function panel    Collection Time: 02/11/20  3:23 AM   Result Value Ref Range    Glucose 117 (H) 70 - 110 mg/dL    Sodium 137 136 - 145 mmol/L    Potassium 3.8 3.5 - 5.1 mmol/L    Chloride 95 95 - 110 mmol/L    CO2 27 23 - 29 mmol/L    BUN, Bld 45 (H) 6 - 20 mg/dL    Calcium 10.0 8.7 - 10.5 mg/dL    Creatinine 5.4 (H) 0.5 - 1.4 mg/dL    Albumin 2.2 (L) 3.5 - 5.2 g/dL    Phosphorus 3.5 2.7 - 4.5 mg/dL    eGFR if  12.7 (A) >60 mL/min/1.73 m^2    eGFR if non African American 11.0 (A) >60 mL/min/1.73 m^2    Anion Gap 15 8 - 16 mmol/L   CBC Without Differential    Collection Time: 02/11/20  3:23 AM   Result Value Ref Range    WBC 10.44 3.90 - 12.70 K/uL     RBC 3.87 (L) 4.60 - 6.20 M/uL    Hemoglobin 11.1 (L) 14.0 - 18.0 g/dL    Hematocrit 34.1 (L) 40.0 - 54.0 %    Mean Corpuscular Volume 88 82 - 98 fL    Mean Corpuscular Hemoglobin 28.7 27.0 - 31.0 pg    Mean Corpuscular Hemoglobin Conc 32.6 32.0 - 36.0 g/dL    RDW 16.2 (H) 11.5 - 14.5 %    Platelets 247 150 - 350 K/uL    MPV 11.7 9.2 - 12.9 fL   POCT glucose    Collection Time: 02/11/20  5:12 AM   Result Value Ref Range    POCT Glucose 114 (H) 70 - 110 mg/dL   POCT glucose    Collection Time: 02/11/20 11:38 AM   Result Value Ref Range    POCT Glucose 141 (H) 70 - 110 mg/dL       Recent Labs   Lab 02/10/20  0753 02/10/20  1043 02/10/20  1723 02/10/20  2328 02/11/20  0512 02/11/20  1138   POCTGLUCOSE 180* 92 150* 117* 114* 141*       Hemoglobin A1C   Date Value Ref Range Status   02/06/2020 4.7 4.0 - 5.6 % Final     Comment:     ADA Screening Guidelines:  5.7-6.4%  Consistent with prediabetes  >or=6.5%  Consistent with diabetes  High levels of fetal hemoglobin interfere with the HbA1C  assay. Heterozygous hemoglobin variants (HbS, HgC, etc)do  not significantly interfere with this assay.   However, presence of multiple variants may affect accuracy.     11/09/2019 4.0 4.0 - 5.6 % Final     Comment:     ADA Screening Guidelines:  5.7-6.4%  Consistent with prediabetes  >or=6.5%  Consistent with diabetes  High levels of fetal hemoglobin interfere with the HbA1C  assay. Heterozygous hemoglobin variants (HbS, HgC, etc)do  not significantly interfere with this assay.   However, presence of multiple variants may affect accuracy.     06/22/2019 4.7 4.0 - 5.6 % Final     Comment:     ADA Screening Guidelines:  5.7-6.4%  Consistent with prediabetes  >or=6.5%  Consistent with diabetes  High levels of fetal hemoglobin interfere with the HbA1C  assay. Heterozygous hemoglobin variants (HbS, HgC, etc)do  not significantly interfere with this assay.   However, presence of multiple variants may affect accuracy.     06/22/2019 4.7 4.0  - 5.6 % Final     Comment:     ADA Screening Guidelines:  5.7-6.4%  Consistent with prediabetes  >or=6.5%  Consistent with diabetes  High levels of fetal hemoglobin interfere with the HbA1C  assay. Heterozygous hemoglobin variants (HbS, HgC, etc)do  not significantly interfere with this assay.   However, presence of multiple variants may affect accuracy.          Active Hospital Problems    Diagnosis  POA    *Seizure [R56.9]  Yes    ESRD on dialysis [N18.6, Z99.2]  Not Applicable    Aphasia [R47.01]  Clinically Undetermined    Pressure injury due to medical device [T85.898A, L89.90]  Yes    Altered mental status [R41.82]  Yes    Gastrointestinal hemorrhage with hematemesis [K92.0]  Yes    GERD with esophagitis [K21.0]  Yes    Severe malnutrition [E43]  Yes     Assessment and Plan  Severe Malnutrition in the context of Social/Environmental Circumstances     Related to (etiology):  Unknown etiology     Signs and Symptoms (as evidenced by):  Energy Intake: per pt, <75% intake over the last year  Body Fat Depletion: overall subcutaneous fat loss, moderate triceps fat loss and protruding patellas.  Muscle Mass Depletion:  moderate muscle wasting of interosseous, temporal, clavicle, calves and quadriceps  Weight Loss: 24% (39 lbs) x 1 year    Recommendations     Recommendation/Intervention: 1. Should pt be cleared for po diet, recommend renal diet with texture per SLP along with Novasource ONS TID. 2. Should pt require enteral feeding, initiate Novasource renal formula at 10ml/hr and advance 10ml Q4hrs to goal rate of 40ml/hr (provides 1920kcal, 87g pro, 691ml free water). Provide additional 500ml water flush/24 hrs. Hold for residuals >500ml.  Goals: 1. Pt to receive nutrition < 48 hrs.      Renovascular hypertension [I15.0]  Yes     Chronic    Chronic diastolic heart failure [I50.32]  Yes     Chronic     2-23-17    1 - Low normal to mildly depressed left ventricular systolic function (EF 50-55%).     2 - Right  ventricular enlargement with mildly depressed systolic function.     3 - Left ventricular diastolic dysfunction.     4 - Right atrial enlargement.     5 - Severe tricuspid regurgitation.     6 - Pulmonary hypertension. The estimated PA systolic pressure is 86 mmHg.     7 - Increased central venous pressure.       Encephalopathy [G93.40]  Yes      Resolved Hospital Problems    Diagnosis Date Resolved POA    Acute metabolic encephalopathy [G93.41] 01/23/2020 Yes    ESRD on hemodialysis [N18.6, Z99.2] 01/31/2020 Not Applicable     Chronic     MWF at [facility]            Assessment and Plan for problems addressed today:      [START ON 2/12/2020] sodium chloride 0.9%   Intravenous Once    [START ON 2/12/2020] barium  450 mL Per NG tube Once    [START ON 2/12/2020] barium  450 mL Per NG tube Once    insulin detemir U-100  7 Units Subcutaneous Daily    levetiracetam oral soln  250 mg Per G Tube BID    midodrine  5 mg Per NG tube Every Mon, Wed, Fri    pantoprazole  40 mg Oral Daily    polyethylene glycol  17 g Per NG tube Daily    sevelamer carbonate  1.6 g Per OG tube TID     acetaminophen, albuterol-ipratropium, bisacodyL, Dextrose 10% Bolus, Dextrose 10% Bolus, glucagon (human recombinant), glucose, glucose, insulin aspart U-100, ondansetron, polyethylene glycol, prochlorperazine, senna-docusate 8.6-50 mg, sodium chloride 0.9%    Acute encephalopathy: resolved  Seizures:  -Patient initially presented to ED prior to getting dialysis due to AMS and brown emesis. Had witnessed tonic clonic seizure requiring ativan shortly after getting a CT head. He was loaded with 1500 mg Keppra, intubated for airway protection, and started on propofol for sedation. Upon physical exam, patient remained unresponsive to verbal or tactile stimuli and had upward left sided gaze with sluggish pupils. Concern for status epilepticus.    -UDS negative, alcohol level wnl  -Sepsis: Initial lactate 2.4, likely due to seizure event;  repeat was 1.9. Patient received 500 ml saline in ED. Initial blood cultures, sputum culture+gram stain neg  Lumbar puncture done, CSF not convincing for meningitis, so ampicillin, vancomycin, rocephin d/c'd on 01/22. However, pt was febrile on 11/23 to 101.3. Blood cultures repeated and restarted on Vanc and Zosyn. Repeat cultures NGTD. HSV PCR neg; Acyclovir discontinued. Pt has remained afebrile. Vanc and Zosyn discontinued 01/26.   -Intracranial: patient with history of ICH with no functional deficits. Possibly multiple foci for seizure overtime. CT without acute changes, MRI brain with chronic changes and hypertensive angiopathy; no acute changes.  - PRES: Patient off cardene drip. MRI reviewed. Will follow blood pressure as patient is normally hypotensive.   -Spot EEG without evidence of current seizure. However patient already received keppra, ativan, and sedated on propofol. 24 hour EEG with L sided structural lesion and underlying epileptiform potential. Initiated 1500 mg keppra on 01/22, per Neurology recs.  -Neurology consulted. repeat EEG on 01/27 shows diffuse slowing, though no focal activity seen on EEG.   -01/28 patient's mental status is continuing to improve day to day, now following commands. 01/30 Patient is alert and following commands. Passed SBT and extubated. Breathing well though with generalized weakness as well as weak cough.   -Mental status continues to improve very gradually daily   -continue keppra 250mg BID per neuro recs   -continue aspiration precautions, fall precautions, seizure precaution  -neuro checks  -work with OT/PT/SLP, globally weak, had prolonged hospitalization and ICU stay, possible critical illness myopathy/neuropathy present    Oropharyngeal dysphagia:  -likely secondary to acute encephalopathy and possible critical illness myopathy/neuropathy from prolonged hospital stay/ICU stay  -SLP consulted, recommending to keep patient NPO for now  -NG tube placed, continue  tube feeds, nutrition consulted  -aspiration precaution  -continue to work with SLP, advance diet as tolerated, remains NPO as per SLP  -IR consulted for PEG     Renovascular hypertension  -Patient on coreg at nursing home; has been compliant as given by nursing home. Patient with hypertensive emergency on admit and started Cardene drip to titrate to BP of 160-180. Off Cardene drip since 01/20.   -Goal SBP < 160 per Neurology.   -Due to soft BP, dc home carvedilol 3.125 mg BID for now  -Optimal off meds at this time, he get midodrine with piror to HD    ESRD on HD:  -Pt undergoes HD MWF outpatient  -Consult nephrology to continue HD inpatient   -Renally dose medications and avoid nephrotoxic medications to preserve residual renal function   -Strict I/O's and daily weights  -Continue to monitor renal function with daily labs     Chronic diastolic heart failure  -Last echo done at [facility] [place] 8/2/19, EF>55%, bi-atrial enlargement  -No signs of exacerbation, appears dry  -Continue to monitor on telemetry  -Monitor intake and output and obtain daily STANDING weights  -Fluid restriction to 1.5L per day and cardiac/renal diet with salt restriction  -Supplemental O2 to keep Spo2 >90%    Severe malnutrition:  -Nutrition consulted  -Novasource @ 44 mL/hr to provide 1440 kcals, 65 g of protein, 516 mL fluid.   -Continue TFs     Hematemesis:  -Patient with reports of coffee ground emesis prior to transport to ED. In ED, dark brown contents suctioned from stomach. Hemoglobin remains stable and pt does not have any HD instability. Patient is well known to GI. His most recent scope was in November that just showed esophagitis similar to his previous EGD.   -GI consulted and appreciate recommendations. No scope indicated at this time  -continue protonix po daily  -continue to monitor H&H daily  -transfuse for hemoglobin under 7  -Nursing / Phlebotomy to use pediatric tubes when drawing labs to minimize iatrogenic anemia and blood loss.       Hx GERD with esophagitis  -Continue Protonix  -Follow up with GI out patient           Discharge plan and follow up: DC to SNF once medically stable    Jaskaran Colon MD  Hospital Medicine Staff  [phone] pager

## 2020-02-11 NOTE — PLAN OF CARE
Problem: Adult Inpatient Plan of Care  Goal: Plan of Care Review  Outcome: Ongoing, Progressing     Pt AAOx4. Moves all extremities well. No c/o pain, N/V, or numbness/tingling in extremities. Left BKA. NG tube in place in R nare with continuous Novasource Renal at goal rate of 44 mL/hr. TF held at midnight. Left and right soft wrist restraints remain in place. No signs of injury. Suction at bedside and pt suctioned q4 hrs and PRN. Secretions are thick and white/clear. Pt cough good. VS stable. Call light is within reach. All safety measures maintained through the shift. Will continue to monitor.

## 2020-02-11 NOTE — PT/OT/SLP PROGRESS
"Physical Therapy Treatment    Patient Name:  Vaughn Retana   MRN:  [id number]  Admitting Diagnosis: Seizure  Recent Surgery: * No surgery found *      Recommendations:     Discharge Recommendations:  nursing facility, skilled(in NH vs return to senior living in NH)   Discharge Equipment Recommendations: (TBD)   Barriers to discharge: None    Plan:     During this hospitalization, patient to be seen 3 x/week to address the above listed problems via therapeutic activities, therapeutic exercises, neuromuscular re-education  · Plan of Care Expires:  02/28/20   Plan of Care Reviewed with: patient    This Plan of care has been discussed with the patient who was involved in its development and understands and is in agreement with the identified goals and treatment plan    Subjective     Communicated with RN (Rayna) prior to session.     Patient comments: "that hurts"  Pain/Comfort:  · Pain Rating 1: 0/10  · Location - Side 1: Left  · Location - Orientation 1: generalized  · Location 1: (residual limb)  · Pain Addressed 1: Reposition, Distraction, Cessation of Activity  · Pain Rating Post-Intervention 1: (no rating provided)    Objective:     Patient found with: bed alarm, NG tube, peripheral IV, restraints, SCD    Patient found sup in bed upon PT entry to room, agreeable to treatment.  NO family present in the room.    General Precautions: Standard, aspiration, fall, NPO, seizure   Orthopedic Precautions:N/A   Braces: N/A       BED MOBILITY (vc's for hand placement sequencing of task):        Rolling to the R:  Mod A.       Rolling to the L:  Mod A.        Sup > sit at the EOB:  Mod/max A for trunk elevation and LE's, exiting on the R side.       Sit > sup:  Mod A for trunk and LE's.       Scooting hips to EOB with mod/max A       Scooting hips along the EOB to the R requiring max A x3 scoots                SITTING AT THE EDGE OF THE BED (10 min)   Assistance Level Required: mod < > min A for trunk/head control with B UE " support   Postural deviations noted: flexed trunk, rounded shoulder, PPT, L post lean   Encouraged: upright posture, SPT, R foot in contact with the floor, midline orientation        TRANSFERS         NP 2* poor trunk control and fatigue while seated at the EOB    THERAPEUTIC ACTIVITIES/EXERCISES  Pt performs B LE AAROM ther ex's while sup in bed x15 reps with vc's    EDUCATION  Patient provided with daily orientation and goals of this PT session. They were educated to call for assistance and to transfer with hospital staff only.  Also, pt was educated on the effects of prolonged immobility and the importance of performing OOB activity and exercises to promote healing and reduce recovery time    Whiteboard updated with correct mobility information. RN/PCT notified.  Pt requires mod A to sit at the EOB.    Patient left supine, with  all lines intact, call button in reach, bed alarm on, restraints reapplied at end of session and RN notified    AM-PAC 6 CLICK MOBILITY  Turning over in bed (including adjusting bedclothes, sheets and blankets)?: 2  Sitting down on and standing up from a chair with arms (e.g., wheelchair, bedside commode, etc.): 2  Moving from lying on back to sitting on the side of the bed?: 2  Moving to and from a bed to a chair (including a wheelchair)?: 1  Need to walk in hospital room?: 1  Climbing 3-5 steps with a railing?: 1  Basic Mobility Total Score: 9     Assessment:     Vaughn Retana is a 54 y.o. male admitted with a medical diagnosis of Seizure.  He presents with the following impairments/functional limitations:  weakness, impaired endurance, impaired sensation, impaired self care skills, impaired functional mobilty, impaired balance, decreased coordination, decreased upper extremity function, decreased lower extremity function, decreased safety awareness, pain, abnormal tone, decreased ROM, impaired coordination, impaired fine motor, impaired skin, impaired joint extensibility. requiring  significant assistance and verbal cues for bed mob, scooting to/along the EOB, static sitting at the EOB 2* weakness, post lean, fatigue.   In light of pt's current functional level and deficits, it is anticipated that pt will need to participate in an intense rehab program consisting of PT and OT in order to achieve full rehab potential to return to previous level of function and roles.  Pt will cont to benefit from skilled PT intervention to address deficits and improve functional mobility.     Rehab Prognosis:  Fair; patient would benefit from acute skilled PT services to address these deficits and reach maximum level of function.      GOALS:   Multidisciplinary Problems     Physical Therapy Goals        Problem: Physical Therapy Goal    Goal Priority Disciplines Outcome Goal Variances Interventions   Physical Therapy Goal     PT, PT/OT Ongoing, Progressing     Description:  Goals to be met by: 2020    Patient will increase functional independence with mobility by performin. Supine to sit with Moderate Assistance - not met  2. Rolling to Left with Moderate Assistance. - not met  3. Rolling to Right with Moderate Assistance - not met  4. Sit to stand transfer with Moderate Assistance - not met  5. Sitting at edge of bed x8 minutes with Contact Guard Assistance - not met  6. Lower extremity exercise program x15 reps per handout, with assistance as needed - not met                      Time Tracking:     PT Received On: 20  PT Start Time: 1113     PT Stop Time: 1142  PT Total Time (min): 29 min     Billable Minutes: Therapeutic Activity 19 and Neuromuscular Re-education 10    Treatment Type: Treatment  PT/PTA: PTA     PTA Visit Number: 2       Ann Marie Klein PTA.  Pager [phone]    2020    .

## 2020-02-11 NOTE — PT/OT/SLP PROGRESS
"Speech Language Pathology Treatment    Patient Name:  Vaughn Retana   MRN:  [id number]   955/955 A    Admitting Diagnosis: Seizure    Recommendations:                 General Recommendations:  Dysphagia therapy  Diet recommendations:  NPO, Liquid Diet Level: NPO   Aspiration Precautions: Strict aspiration precautions , excellent and frequent oral care  General Precautions: Standard, aspiration, fall, NPO, seizure  Communication strategies:  go to room if call light pushed    Subjective     Patient stated, "It is going fine." -in relation to swallowing  NG tube in place.      Objective:     Has the patient been evaluated by SLP for swallowing?   Yes  Keep patient NPO? Yes   Current Respiratory Status: room air      Patient awake upon SLP entry. Patient with wet vocal quality upon entry. SLP cued patient to cough and suctioning provided. Patient with a wet cough requiring suctioning throughout session. Patient assessed with an ice chip x2, teaspoon sip of water x1, teaspoon sip of nectar thick water x1, and teaspoon sip of honey thick water x1. He presents with multiple, audible swallows, immediate coughing/choking, and wet vocal quality s/p all swallows. SLP introduced basic swallowing exercises including effortful swallows, Chin Tuck Against Resistance, BOT /g/ and /k/. Patient demonstrated all exercises with adequate effort. SLP provided patient education on recommendation to remain NPO, SLP recommendations, SLP role, s/s and risks of aspiration, safe swallow precautions, and POC. He did not demonstrate understanding and states, "It is going fine" immediately followed by wet coughing.    Assessment:     Vaughn Retana is a 54 y.o. male with an SLP diagnosis of dysphagia. ST will continue to follow.     Goals:   Multidisciplinary Problems     SLP Goals        Problem: SLP Goal    Goal Priority Disciplines Outcome   SLP Goal     SLP Ongoing, Progressing   Description:  Speech Language Pathology  Goals expected to " be met by 2/17:  1. Pt will participate in ongoing assessment of swallow to determine safest, least restrictive diet.                    Plan:     · Patient to be seen:  3 x/week   · Plan of Care expires:  02/29/20  · Plan of Care reviewed with:  patient   · SLP Follow-Up:  Yes       Discharge recommendations:  nursing facility, skilled     Time Tracking:     SLP Treatment Date:   02/11/20  Speech Start Time:  0821  Speech Stop Time:  0838     Speech Total Time (min):  17 min    Billable Minutes: Treatment Swallowing Dysfunction 8 Education 8    KIRBY Benitez, CCC-SLP  Pager: [phone]  2/11/2020

## 2020-02-11 NOTE — PLAN OF CARE
Problem: Physical Therapy Goal  Goal: Physical Therapy Goal  Description  Goals to be met by: 2020    Patient will increase functional independence with mobility by performin. Supine to sit with Moderate Assistance - not met  2. Rolling to Left with Moderate Assistance. - not met  3. Rolling to Right with Moderate Assistance - not met  4. Sit to stand transfer with Moderate Assistance - not met  5. Sitting at edge of bed x8 minutes with Contact Guard Assistance - not met  6. Lower extremity exercise program x15 reps per handout, with assistance as needed - not met     Outcome: Ongoing, Progressing      Discharge Recommendations: SNF in [place] vs return [place] group home [place]    Pt requires mod A to sit at the EOB.    Goals remain appropriate.     Ann Marie Klein, PTA.   [phone]   2020

## 2020-02-11 NOTE — PLAN OF CARE
Problem: SLP Goal  Goal: SLP Goal  Description  Speech Language Pathology  Goals expected to be met by 2/17:  1. Pt will participate in ongoing assessment of swallow to determine safest, least restrictive diet.     Outcome: Ongoing, Progressing   Patient with overt s/s of aspiration with all trials. Recommend continue NPO, excellent and frequent oral care. ST will continue to follow.  ELLIOTT Benitez., CCC-SLP  02/11/2020

## 2020-02-11 NOTE — CONSULTS
Radiology Consult    Vaughn Retana is a 54 y.o. male with a history of ESRD on HD, L BKA for osteo, dCHF, multiple ICH w/o residual deficits, multiple GIB admitted with seizures.  Pt failed SLP evaluation and receiving tube feeds through NGT.  IR consulted for gastrostomy tube placement.    Past Medical History:   Diagnosis Date    Amputation stump pain 9/10/2013    Aspiration pneumonia 7/27/2015    Asterixis 11/8/2016    C. difficile colitis 8/7/2015    Cholelithiasis without obstruction 8/25/2015    Chronic diastolic heart failure     2-23-17   1 - Low normal to mildly depressed left ventricular systolic function (EF 50-55%).    2 - Right ventricular enlargement with mildly depressed systolic function.    3 - Left ventricular diastolic dysfunction.    4 - Right atrial enlargement.    5 - Severe tricuspid regurgitation.    6 - Pulmonary hypertension. The estimated PA systolic pressure is 86 mmHg.    7 - Increased central venous pressure.     Chronic low back pain 12/1/2015    Closed head injury 9/8/2016    DVT (deep venous thrombosis) 7/28/2017    ESRD on hemodialysis 2/7/2013    MWF at [facility]   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    Metabolic acidosis, IAG, reduced excretion of inorganic acids     Myoclonic jerking 9/20/2016    Noncompliance with medication regimen 12/4/2018    Secondary hyperparathyroidism (of renal origin)     Secondary pulmonary hypertension 3/23/2017    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 HD Patient MWF, K+ lab prior to procedure.     ESOPHAGOGASTRODUODENOSCOPY N/A 6/12/2018    Procedure: EGD (ESOPHAGOGASTRODUODENOSCOPY);  Surgeon: Man Galicia MD;  [facility]  Service: Endoscopy;  Laterality: N/A;  EGD in 8-12 weeks with Dr. Galicia on 4th floor for follow up erosive esophagitis and Mejia's surveillance.    Patient should be on Pantoprazole 40mg every 12 hours or the equivulant of another PPI    awaiting for patient to reply back regarding changing    ESOPHAGOGASTRODUODENOSCOPY N/A 3/7/2019    Procedure: EGD (ESOPHAGOGASTRODUODENOSCOPY);  Surgeon: Man Galicia MD;  [facility]  Service: Endoscopy;  Laterality: N/A;    ESOPHAGOGASTRODUODENOSCOPY N/A 9/23/2019    Procedure: EGD (ESOPHAGOGASTRODUODENOSCOPY);  Surgeon: Keanu Rainey MD;  [facility]  Service: Endoscopy;  Laterality: N/A;    ESOPHAGOGASTRODUODENOSCOPY N/A 10/2/2019    Procedure: EGD (ESOPHAGOGASTRODUODENOSCOPY);  Surgeon: Kevin De La Paz MD;  [facility]  Service: Endoscopy;  Laterality: N/A;    ESOPHAGOGASTRODUODENOSCOPY N/A 11/12/2019    Procedure: EGD (ESOPHAGOGASTRODUODENOSCOPY);  Surgeon: Kevin De La Paz MD;  [facility]  Service: Endoscopy;  Laterality: N/A;    FOOT AMPUTATION THROUGH METATARSAL      left foot    LEG AMPUTATION THROUGH KNEE  12/18/2013    left BKA    R AVF  9/12/12    UPPER GASTROINTESTINAL ENDOSCOPY         Discussed with primary team, Dr. Colon.    Imaging reviewed with Radiology staff, Dr. Young.     Procedure: Gastrostomy tube placement    Scheduled Meds:    sodium chloride 0.9%   Intravenous Once    heparin (porcine)  5,000  Units Subcutaneous Q8H    insulin detemir U-100  7 Units Subcutaneous Daily    levetiracetam oral soln  250 mg Per G Tube BID    midodrine  5 mg Per NG tube Every Mon, Wed, Fri    pantoprazole  40 mg Oral Daily    polyethylene glycol  17 g Per NG tube Daily    sevelamer carbonate  1.6 g Per OG tube TID     Continuous Infusions:   PRN Meds:acetaminophen, albuterol-ipratropium, bisacodyL, Dextrose 10% Bolus, Dextrose 10% Bolus, glucagon (human recombinant), glucose, glucose, insulin aspart U-100, ondansetron, polyethylene glycol, prochlorperazine, senna-docusate 8.6-50 mg, sodium chloride 0.9%    Allergies:   Review of patient's allergies indicates:   Allergen Reactions    Fosrenol [lanthanum] Nausea And Vomiting     Nausea and vomiting       Labs:  No results for input(s): INR in the last 168 hours.    Invalid input(s):  PT,  PTT    Recent Labs   Lab 02/11/20 0323   WBC 10.44   HGB 11.1*   HCT 34.1*   MCV 88         Recent Labs   Lab 02/06/20  0310  02/11/20  0323   *   < > 117*      < > 137   K 3.8   < > 3.8   CL 95   < > 95   CO2 26   < > 27   BUN 38*   < > 45*   CREATININE 6.1*   < > 5.4*   CALCIUM 10.7*   < > 10.0   MG 2.4   < > 2.1   ALT 25  --   --    AST 41*  --   --    ALBUMIN 3.0*   < > 2.2*   BILITOT 0.4  --   --     < > = values in this interval not displayed.         Vitals (Most Recent):  Temp: 96.5 °F (35.8 °C) (02/11/20 0517)  Pulse: 93 (02/11/20 0517)  Resp: 18 (02/11/20 0517)  BP: (!) 127/59 (02/11/20 0517)  SpO2: 99 % (02/11/20 0517)    Plan:   1. NPO after midnight (hold tube feeds).  2. Maintain NGT for procedure.  3. Administer barium overnight (ordered).  4. Hold anticoagulants (AM heparin ppx).  5. Gastrostomy tube placement scheduled for tomorrow 2/12.    Kaylee Tan MD  Resident  Department of Radiology  Pager: [phone]

## 2020-02-11 NOTE — PLAN OF CARE
Continue OT plan of care.    Problem: Occupational Therapy Goal  Goal: Occupational Therapy Goal  Description  Goals to be met by: 2 weeks (2/15/20)     Patient will increase functional independence with ADLs by performing:    Grooming while seated with Moderate Assistance.  Sitting at edge of bed x8 minutes with Contact Guard Assistance.  Supine to sit with Moderate Assistance.  Squat pivot transfers with Maximum Assistance.   Sit to stand transfers with MOD A (LLE prosthesis present)  Increased functional strength to WFL for ADLs and mobility tasks.  Pt will follow 3/3 one-step commands to participate in ADL task.      Outcome: Ongoing, Progressing

## 2020-02-11 NOTE — PT/OT/SLP PROGRESS
"Occupational Therapy   Treatment    Name: Vaughn Retana  MRN: [id number]  Admitting Diagnosis:  Seizure       Recommendations:     Discharge Recommendations: nursing facility, skilled  Discharge Equipment Recommendations:  (TBD (progress pending))  Barriers to discharge:  Decreased caregiver support    Assessment:     Vaughn Retana is a 54 y.o. male with a medical diagnosis of Seizure.  He presents with performance deficits affecting function are weakness, impaired endurance, impaired self care skills, impaired functional mobilty, gait instability, impaired balance, decreased coordination, decreased upper extremity function, decreased lower extremity function, decreased ROM, impaired coordination, impaired fine motor, decreased safety awareness. Pt presenting as fall risk due to decreased endurance, decreased strength, decreased safety awareness, and decreased BUE ROM. Pt would continue to benefit from acute skilled OT services to increase functional independence.    Rehab Prognosis:  Fair; patient would benefit from acute skilled OT services to address these deficits and reach maximum level of function.       Plan:     Patient to be seen 3 x/week to address the above listed problems via self-care/home management, therapeutic activities, therapeutic exercises, neuromuscular re-education  · Plan of Care Expires: 03/01/20  · Plan of Care Reviewed with: patient    Subjective   "Hold on!"  Pain/Comfort:  · Pain Rating 1: 0/10    Objective:     Communicated with: RN prior to session.  Patient found HOB elevated with bed alarm, NG tube, peripheral IV, restraints upon OT entry to room. Pt with decreased participation this session, requiring max verbal cues and not voicing wants/needs.     General Precautions: Standard, aspiration, fall, NPO, seizure   Orthopedic Precautions:N/A   Braces: N/A     Occupational Performance:     Bed Mobility:    · Patient completed Rolling/Turning to Left with  moderate assistance and " maximal assistance; increased initiation for task this session but unable to complete  · Patient completed Rolling/Turning to Right with maximal assistance  · Patient completed Scooting to EOB with maximal assistance  · Patient completed Supine to Sit with moderate assistance and maximal assistance; increased initiation for task but unable to complete  · Patient completed Sit to Supine with maximal assistance     Functional Mobility/Transfers:  · Not assessed this date due to pt with decreased participation and poor EOB sitting tolerance    Activities of Daily Living:  · Lower Body Dressing: total assistance to don sock while HOB   · Pt declined when offered other tasks to perform      AMPAC 6 Click ADL: 11    Treatment & Education:  OT role and POC reviewed. Pt tolerated session fair this date. Pt with decreased participation requiring max verbal cues to maintain EOB sitting balance, pt required MAX A due to posterior and L lateral leans; No attempts to self-correct observed. Pt able to self-suction while seated EOB. PROM performed to BUE by performing shoulder flex to assist with trunk stability, pt continued to present with no attempts to maintain posture.     Patient left HOB elevated with all lines intact, call button in reach and restraints reapplied at end of sessionEducation:  . PCT notified at the end of session to assist with cleaning and linen change.    GOALS:   Multidisciplinary Problems     Occupational Therapy Goals        Problem: Occupational Therapy Goal    Goal Priority Disciplines Outcome Interventions   Occupational Therapy Goal     OT, PT/OT Ongoing, Progressing    Description:  Goals to be met by: 2 weeks (2/15/20)     Patient will increase functional independence with ADLs by performing:    Grooming while seated with Moderate Assistance.  Sitting at edge of bed x8 minutes with Contact Guard Assistance.  Supine to sit with Moderate Assistance.  Squat pivot transfers with Maximum  Assistance.   Sit to stand transfers with MOD A (LLE prosthesis present)  Increased functional strength to WFL for ADLs and mobility tasks.  Pt will follow 3/3 one-step commands to participate in ADL task.                       Time Tracking:     OT Date of Treatment: 02/11/20  OT Start Time: 1015  OT Stop Time: 1041  OT Total Time (min): 26 min    Billable Minutes:Neuromuscular Re-education 26 minutes    RANDELL Davison  2/11/2020

## 2020-02-12 NOTE — PROGRESS NOTES
3 hour dialysis complete.  Blood rinsed back.  Needles removed from RICHARD fistula.  Pressure held x 5 minutes.  Hemostasis achieved.  Covered with gauze and paper tape.  +thrill +bruit.  No UF due to hypotension (asymptomatic).  Transported from dialysis unit to room 955 via bed with 2 transporters and this RN.

## 2020-02-12 NOTE — NURSING
Pt to ROCU post procedure bay #4. Recd bedside report from ISMAEL Chen. Pt arrived drowsy, responds to voice and nods appropriately. Pt in soft risk restraints, VSS, procedure site w/o bleeding or hematoma and dsg CDI. G-tube to gravity. Pt in no acute distress, will continue to monitor. Pt floor nurse will escort to unit.

## 2020-02-12 NOTE — PLAN OF CARE
Problem: Fall Injury Risk  Goal: Absence of Fall and Fall-Related Injury  Outcome: Unable to Meet, Plan Revised     Problem: Infection  Goal: Infection Symptom Resolution  Outcome: Unable to Meet, Plan Revised     Problem: Adult Inpatient Plan of Care  Goal: Plan of Care Review  Outcome: Unable to Meet, Plan Revised  Goal: Patient-Specific Goal (Individualization)  Outcome: Unable to Meet, Plan Revised  Goal: Absence of Hospital-Acquired Illness or Injury  Outcome: Unable to Meet, Plan Revised  Goal: Optimal Comfort and Wellbeing  Outcome: Unable to Meet, Plan Revised  Goal: Readiness for Transition of Care  Outcome: Unable to Meet, Plan Revised  Goal: Rounds/Family Conference  Outcome: Unable to Meet, Plan Revised     Problem: Diabetes Comorbidity  Goal: Blood Glucose Level Within Desired Range  Outcome: Unable to Meet, Plan Revised     Problem: Restraint, Nonbehavioral (Nonviolent)  Goal: Personal Dignity and Safety Maintained  Outcome: Unable to Meet, Plan Revised     Problem: Skin Injury Risk Increased  Goal: Skin Health and Integrity  Outcome: Unable to Meet, Plan Revised     Problem: Device-Related Complication Risk (Hemodialysis)  Goal: Safe, Effective Therapy Delivery  Outcome: Unable to Meet, Plan Revised     Problem: Hemodynamic Instability (Hemodialysis)  Goal: Vital Signs Remain in Desired Range  Outcome: Unable to Meet, Plan Revised     Problem: Infection (Hemodialysis)  Goal: Absence of Infection Signs/Symptoms  Outcome: Unable to Meet, Plan Revised     Problem: Wound  Goal: Optimal Wound Healing  Outcome: Unable to Meet, Plan Revised     Problem: Malnutrition  Goal: Improved Nutritional Intake  Outcome: Unable to Meet, Plan Revised     Problem: Electrolyte Imbalance (Chronic Kidney Disease)  Goal: Electrolyte Balance  Outcome: Unable to Meet, Plan Revised     Problem: Fluid Volume Excess (Chronic Kidney Disease)  Goal: Fluid Balance  Outcome: Unable to Meet, Plan Revised    Neuro - aaox4  Deficits -  "right side upper and lower extremity weakness  V/S - see v/s flowsheet  Tele - none  Diet - NPO, TF Novasource to right nare @44ml/hr  GI - bm today x3  - anuric  IV - x1  IV Fluids - none  Skin - skin tear to scrotum  Others - turned every 2 hrs, suctioned frequently, soft restraints thuy upper extremity,  pt stated " I'm hungry"     "

## 2020-02-12 NOTE — PROCEDURES
Radiology Post-Procedure Note    Pre Op Diagnosis: Dysphagia  Post Op Diagnosis: Same    Procedure: Gastrostomy tube placement    Procedure performed by: Gilles Ocasio MD    Written Informed Consent Obtained: Yes  Specimen Removed: NO  Estimated Blood Loss: Minimal    Findings:   US and fluoroscopic guided 18F push-type gastrostomy tube placed without complication.  Tube to gravity drainage.  If no signs of peritonitis tomorrow am, tube may be used for feeding.    Patient tolerated procedure well.    Gilles Ocasio MD  Diagnostic and Interventional Radiologist  Department of Radiology  Pager: [phone]

## 2020-02-12 NOTE — PLAN OF CARE
G-tube placement complete. Pt tolerated well. VSS. No signs or symptoms of distress noted.  Dressing CDI.  Pt will be transferred to ROCU and report to be given at bedside. Report called to floor RN.

## 2020-02-12 NOTE — PT/OT/SLP PROGRESS
Physical Therapy      Patient Name:  Vaughn Retana   MRN:  [id number]    Patient not seen today secondary to Unavailable (Comment)(pt off unit AM and PM attempts). Will follow-up 2/13/20.    Lou Mariee, PTA

## 2020-02-12 NOTE — PLAN OF CARE
Problem: Fall Injury Risk  Goal: Absence of Fall and Fall-Related Injury  Outcome: Ongoing, Progressing     Problem: Infection  Goal: Infection Symptom Resolution  Outcome: Ongoing, Progressing     Problem: Adult Inpatient Plan of Care  Goal: Plan of Care Review  Outcome: Ongoing, Progressing  Goal: Patient-Specific Goal (Individualization)  Outcome: Ongoing, Progressing  Goal: Absence of Hospital-Acquired Illness or Injury  Outcome: Ongoing, Progressing  Goal: Optimal Comfort and Wellbeing  Outcome: Ongoing, Progressing  Goal: Readiness for Transition of Care  Outcome: Ongoing, Progressing  Goal: Rounds/Family Conference  Outcome: Ongoing, Progressing     Problem: Diabetes Comorbidity  Goal: Blood Glucose Level Within Desired Range  Outcome: Ongoing, Progressing     Problem: Skin Injury Risk Increased  Goal: Skin Health and Integrity  Outcome: Ongoing, Progressing     Problem: Device-Related Complication Risk (Hemodialysis)  Goal: Safe, Effective Therapy Delivery  Outcome: Ongoing, Progressing     Problem: Hemodynamic Instability (Hemodialysis)  Goal: Vital Signs Remain in Desired Range  Outcome: Ongoing, Progressing     Problem: Infection (Hemodialysis)  Goal: Absence of Infection Signs/Symptoms  Outcome: Ongoing, Progressing     Problem: Wound  Goal: Optimal Wound Healing  Outcome: Ongoing, Progressing     Problem: Malnutrition  Goal: Improved Nutritional Intake  Outcome: Ongoing, Progressing     Problem: Electrolyte Imbalance (Chronic Kidney Disease)  Goal: Electrolyte Balance  Outcome: Ongoing, Progressing     Problem: Fluid Volume Excess (Chronic Kidney Disease)  Goal: Fluid Balance  Outcome: Ongoing, Progressing    Neuro - aaox4  Deficits - right sided weakness  V/S - see v/s flowsheet  Tele - none  Diet - NPO  GI - multiple BM today  - anuric  IV - x1  IV Fluids - none  Skin -skin tear  Others - PEG tube placed to LUQ and drainage to gravity not to use until 8AM on 02/13/2020, restraints removed, NG tube  to right nare clamped, pt turned every 2 hrs. Had HD today. Order to resume TF in AM to PEG.

## 2020-02-12 NOTE — PROGRESS NOTES
[facility] HEMODIALYSIS NOTE     Patient currently on hemodialysis for removal of uremic toxins and volume.     Patient seen and evaluated on hemodialysis, tolerating treatment, see HD flowsheet for vitals and assessments.      Ultrafiltration goal is 2L as tolerated      Labs have been reviewed and the dialysate bath has been adjusted.     Assessment/Plan:    -Patient seen on HD, tolerating treatment well, w/o complaints  -Keep map >65   -Renal diet  -Phos 2.6, discontinued renvela   -Strict I/O's and daily weights  -Daily renal function panels  -Maintain Hgb >7.0  -Will continue to follow while inpatient       VANITA Rodas, AGNP-C  Nephrology  Pager:  [phone]

## 2020-02-12 NOTE — NURSING
Pt return from dialysis, aaox4. Pt removed from restraints. Pt denies pain and sob, orally suctioned. Will continue to monitor.

## 2020-02-12 NOTE — NURSING
Pt return to room via stretcher with transport and RN, Jennifer. PEG tube to gravity. NG tube to right nare clamped. Pt moved back to bed. Slightly drowsy and able to answer question, family at bedside. Pt was given lucina headphones and sandisk [id number] from his cousin and placed on. Will continue to monitor.

## 2020-02-12 NOTE — PT/OT/SLP PROGRESS
Occupational Therapy      Patient Name:  Vaughn Retana   MRN:  [id number]    Patient not seen today secondary to pt off the floor for PEG tube placement and then dialysis. Will follow-up as scheduled and appropriate.    WILBUR Richardson  2/12/2020

## 2020-02-12 NOTE — H&P
Inpatient Radiology Pre-procedure Note    History of Present Illness:  Vaughn Retana is a 54 y.o. male who presents for gastrostomy tube placement.  Admission H&P reviewed.  Past Medical History:   Diagnosis Date    Amputation stump pain 9/10/2013    Aspiration pneumonia 7/27/2015    Asterixis 11/8/2016    C. difficile colitis 8/7/2015    Cholelithiasis without obstruction 8/25/2015    Chronic diastolic heart failure     2-23-17   1 - Low normal to mildly depressed left ventricular systolic function (EF 50-55%).    2 - Right ventricular enlargement with mildly depressed systolic function.    3 - Left ventricular diastolic dysfunction.    4 - Right atrial enlargement.    5 - Severe tricuspid regurgitation.    6 - Pulmonary hypertension. The estimated PA systolic pressure is 86 mmHg.    7 - Increased central venous pressure.    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    Metabolic acidosis, IAG, reduced excretion of inorganic acids     Myoclonic jerking 9/20/2016    Noncompliance with medication regimen 12/4/2018    Secondary hyperparathyroidism (of renal origin)     Secondary pulmonary hypertension 3/23/2017    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    TB lung, latent 08/25/2015    Negative Quantiferon Gold 3-23-17     Past Surgical History:   Procedure Laterality Date    COLONOSCOPY      COLONOSCOPY N/A 4/4/2017     Procedure: COLONOSCOPY;  Surgeon: Walker Stern MD;  Location: [facility] (2ND FLR);  Service: Endoscopy;  Laterality: N/A;  PA Systolic Pressure 85.56. HD Patient MWF, K+ lab prior to procedure.     ESOPHAGOGASTRODUODENOSCOPY N/A 6/12/2018    Procedure: EGD (ESOPHAGOGASTRODUODENOSCOPY);  Surgeon: Man Galicia MD;  Location: [facility] (2ND FLR);  Service: Endoscopy;  Laterality: N/A;  EGD in 8-12 weeks with Dr. Galicia on 4th floor for follow up erosive esophagitis and Mejia's surveillance.    Patient should be on Pantoprazole 40mg every 12 hours or the equivulant of another PPI    awaiting for patient to reply back regarding changing    ESOPHAGOGASTRODUODENOSCOPY N/A 3/7/2019    Procedure: EGD (ESOPHAGOGASTRODUODENOSCOPY);  Surgeon: Man Galicia MD;  [facility]  Service: Endoscopy;  Laterality: N/A;    ESOPHAGOGASTRODUODENOSCOPY N/A 9/23/2019    Procedure: EGD (ESOPHAGOGASTRODUODENOSCOPY);  Surgeon: Keanu Rainey MD;  [facility]  Service: Endoscopy;  Laterality: N/A;    ESOPHAGOGASTRODUODENOSCOPY N/A 10/2/2019    Procedure: EGD (ESOPHAGOGASTRODUODENOSCOPY);  Surgeon: Kevin De La Paz MD;  [facility]  Service: Endoscopy;  Laterality: N/A;    ESOPHAGOGASTRODUODENOSCOPY N/A 11/12/2019    Procedure: EGD (ESOPHAGOGASTRODUODENOSCOPY);  Surgeon: Kevin De La Paz MD;  Location: [facility] ([address] [place] [address]);  Service: Endoscopy;  Laterality: N/A;    FOOT AMPUTATION THROUGH METATARSAL      left foot    LEG AMPUTATION THROUGH KNEE  12/18/2013    left BKA    R AVF  9/12/12    UPPER GASTROINTESTINAL ENDOSCOPY         Review of Systems:   As documented in primary team H&P    Home Meds:   Prior to Admission medications    Medication Sig Start Date End Date Taking? Authorizing Provider   acetaminophen (TYLENOL) 325 MG tablet Take 2 tablets (650 mg total) by mouth every 8 (eight) hours as needed. 8/27/19   Renea Gallegos MD   carvedilol (COREG) 3.125 MG tablet  Take 1 tablet (3.125 mg total) by mouth 2 (two) times daily with meals. 9/7/19 9/6/20  Gavino Cordoba MD   metoclopramide HCl (REGLAN) 10 MG tablet Take 1 tablet (10 mg total) by mouth 3 (three) times daily before meals. 10/8/19   Ilia Payan MD   midodrine (PROAMATINE) 5 MG Tab Please take 30 min before dialysis on Monday Wednesday and Friday 9/7/19   Gavino Cordoba MD   ondansetron (ZOFRAN) 8 MG tablet Take 1 tablet (8 mg total) by mouth every 12 (twelve) hours as needed for Nausea. 9/7/19   Gavino Cordoba MD   pantoprazole (PROTONIX) 40 MG tablet Take 1 tablet (40 mg total) by mouth 2 (two) times daily. 11/12/19 11/11/20  Rodri Sheets MD   promethazine (PHENERGAN) 25 MG tablet Take 1 tablet (25 mg total) by mouth every 6 (six) hours as needed for Nausea. 11/22/19   Corina Jin MD   RENAPLEX-D 800 mcg-12.5 mg -2,000 unit Tab Take 1 tablet by mouth once daily. 8/23/18   Jai Terry MD   sevelamer carbonate (RENVELA) 800 mg Tab Take 1 tablet (800 mg total) by mouth 3 (three) times daily with meals. 10/2/19 10/1/20  Vince Castorena MD   sucralfate (CARAFATE) 100 mg/mL suspension Take 10 mLs (1 g total) by mouth 4 (four) times daily before meals and nightly. 10/2/19   Vince Castorena MD     Scheduled Meds:    sodium chloride 0.9%   Intravenous Once    insulin detemir U-100  7 Units Subcutaneous Daily    levetiracetam oral soln  250 mg Per G Tube BID    midodrine  5 mg Per NG tube Every Mon, Wed, Fri    pantoprazole  40 mg Oral Daily    polyethylene glycol  17 g Per NG tube Daily    sevelamer carbonate  1.6 g Per OG tube TID     Continuous Infusions:   PRN Meds:acetaminophen, albuterol-ipratropium, bisacodyL, Dextrose 10% Bolus, Dextrose 10% Bolus, glucagon (human recombinant), glucose, glucose, insulin aspart U-100, ondansetron, polyethylene glycol, prochlorperazine, senna-docusate 8.6-50 mg, sodium chloride 0.9%  Anticoagulants/Antiplatelets: no  anticoagulation    Allergies:   Review of patient's allergies indicates:   Allergen Reactions    Fosrenol [lanthanum] Nausea And Vomiting     Nausea and vomiting     Sedation Hx: have not been any systemic reactions    Labs:  No results for input(s): INR in the last 168 hours.    Invalid input(s):  PT,  PTT    Recent Labs   Lab 02/11/20  0323   WBC 10.44   HGB 11.1*   HCT 34.1*   MCV 88         Recent Labs   Lab 02/06/20  0310  02/12/20  0651   *   < > 119*      < > 133*   K 3.8   < > 4.0   CL 95   < > 93*   CO2 26   < > 25   BUN 38*   < > 72*   CREATININE 6.1*   < > 7.2*   CALCIUM 10.7*   < > 10.3   MG 2.4   < > 2.3   ALT 25  --   --    AST 41*  --   --    ALBUMIN 3.0*   < > 2.3*   BILITOT 0.4  --   --     < > = values in this interval not displayed.         Vitals:  Temp: 99.3 °F (37.4 °C) (02/12/20 0730)  Pulse: 91 (02/12/20 0940)  Resp: 18 (02/12/20 0940)  BP: 131/81 (02/12/20 0940)  SpO2: 100 % (02/12/20 0940)     Physical Exam:  ASA: 2  Mallampati: 2    General: no acute distress  Mental Status: alert and oriented to person, place and time  HEENT: normocephalic, atraumatic  Chest: unlabored breathing  Heart: regular heart rate  Abdomen: nondistended  Extremity: moves all extremities    Plan: gastrostomy tube placement  Sedation Plan: moderate    Titus Escobar MD  PGY-II Radiology

## 2020-02-12 NOTE — PLAN OF CARE
Problem: Adult Inpatient Plan of Care  Goal: Plan of Care Review  Outcome: Ongoing, Progressing     Pt AAOx4. Moves all extremities well. No c/o pain, N/V, or numbness/tingling in extremities. Left BKA. NG tube in place in R nare with continuous Novasource Renal at goal rate of 44 mL/hr. TF held at midnight. Barium administered as ordered. Left and right soft wrist restraints remain in place. No signs of injury. Suction at bedside and pt suctioned q4 hrs and PRN. Secretions are thick and white/clear. Pt cough good and productive. Incontinence care performed. Thick barrier cream applied and sacral dressing in place. Wound care consult was placed yesterday to assess skin breakdown on sacral area. VS stable. Call light is within reach. All safety measures maintained through the shift. Will continue to monitor.

## 2020-02-12 NOTE — PLAN OF CARE
Plan of care reviewed with patient. Pt unable to verbally communicate. Pt arrived to 188 for Gastrostomy tube placement . Pt oriented to unit and staff. Patient placed on continuous monitoring, as required by sedation policy. Name verified using two identifiers. RN to remain at bedside, continuous monitoring maintained. See flow sheets for monitoring, medication administration, and updates.

## 2020-02-13 PROBLEM — K92.2 UGIB (UPPER GASTROINTESTINAL BLEED): Status: ACTIVE | Noted: 2020-01-01

## 2020-02-13 NOTE — PT/OT/SLP PROGRESS
Physical Therapy Treatment    Patient Name:  Vaughn Retana   MRN:  [id number]  Admitting Diagnosis: Seizure  Recent Surgery: * No surgery found *      Recommendations:     Discharge Recommendations:  nursing facility, skilled(in NH)   Discharge Equipment Recommendations: (TBD)   Barriers to discharge: None    Plan:     During this hospitalization, patient to be seen 3 x/week to address the above listed problems via therapeutic activities, therapeutic exercises, neuromuscular re-education  · Plan of Care Expires:  02/28/20   Plan of Care Reviewed with: patient    This Plan of care has been discussed with the patient who was involved in its development and understands and is in agreement with the identified goals and treatment plan    Subjective     Communicated with RN (Hailey) prior to session.     Patient comments: Pt with c/o fatigue during session  Pain/Comfort:  · Pain Rating 1: 0/10  · Pain Rating Post-Intervention 1: 0/10    Objective:     Patient found with: bed alarm, NG tube, PEG Tube(waffle pad)    Patient found sup in bed upon PT entry to room, agreeable to treatment.  No family present in the room.    General Precautions: Standard, aspiration, fall, NPO, seizure   Orthopedic Precautions:N/A   Braces: N/A     **Pt's prosthesis for L residual limb present, however, pt states it's missing a piece    BED MOBILITY (vc's for hand placement sequencing of task):        Rolling to the R:  Mod A.       Rolling to the L:  NT.        Sup > sit at the EOB:  Mod A for trunk elevation and LE's, exiting on the R side.       Sit > sup:  Mod A for trunk and LE's.       Scooting hips to EOB with mod A       Scooting hips along the EOB to the R requiring mod/max A x2-3 scoots                    SITTING AT THE EDGE OF THE BED (10 min)   Assistance Level Required: SBA for safety with B UE support   Postural deviations noted: flexed trunk, rounded shoulders, PPT   Encouraged: upright posture, APT, R foot in contact with the  floor        TRANSFERS  (vc's for hand placement, sequencing of task and safety)   Patient completed Sit <> Stand Transfer from EOB with max A of 1 for hip ext, R knee ext and B UE support with no assistive device x2 trial(s).  Pt unable to achieve full erect posture   Patient completed Stand <> Sit Transfer to EOB with max A for controlled descent with no assistive device     THERAPEUTIC ACTIVITIES/EXERCISES  Pt receives PROM/AAROM to R LE sup in bed (ankle DF, hip flex, hip add/abd, hip IR/ER)  x15 reps     Pt receives PROM/AAROM to L residual LE sup in bed (hip flex, hip add/abd, hip IR/ER)  x15 reps     EDUCATION  Patient provided with daily orientation and goals of this PT session. They were educated to call for assistance and to transfer with hospital staff only.  Also, pt was educated on the effects of prolonged immobility and the importance of performing OOB activity and exercises to promote healing and reduce recovery time    Whiteboard updated with correct mobility information. RN/PCT notified.  Pt requires mod A to sit at the EOB.    Patient left sup in bed with HOB elevated at 30* angle, with  all lines intact, call button in reach, bed alarm on and RN notified    AM-PAC 6 CLICK MOBILITY  Turning over in bed (including adjusting bedclothes, sheets and blankets)?: 2  Sitting down on and standing up from a chair with arms (e.g., wheelchair, bedside commode, etc.): 2  Moving from lying on back to sitting on the side of the bed?: 2  Moving to and from a bed to a chair (including a wheelchair)?: 1  Need to walk in hospital room?: 1  Climbing 3-5 steps with a railing?: 1  Basic Mobility Total Score: 9     Assessment:     Vaughn Retana is a 54 y.o. male admitted with a medical diagnosis of Seizure.  He presents with the following impairments/functional limitations:  weakness, impaired endurance, impaired sensation, impaired self care skills, impaired functional mobilty, impaired balance, decreased  coordination, decreased upper extremity function, decreased lower extremity function, decreased safety awareness, impaired coordination, impaired skin, impaired joint extensibility. requiring significant assistance and verbal cues for bed mob, scooting to/along the EOB, sit < > stand 2* weakness, fatigue, decreased trunk control.   In light of pt's current functional level and deficits, it is anticipated that pt will need to participate in an intense rehab program consisting of PT and OT in order to achieve full rehab potential to return to previous level of function and roles.  Pt will cont to benefit from skilled PT intervention to address deficits and improve functional mobility.     Rehab Prognosis:  Fair; patient would benefit from acute skilled PT services to address these deficits and reach maximum level of function.      GOALS:   Multidisciplinary Problems     Physical Therapy Goals        Problem: Physical Therapy Goal    Goal Priority Disciplines Outcome Goal Variances Interventions   Physical Therapy Goal     PT, PT/OT Ongoing, Progressing     Description:  Goals to be met by: 2020    Patient will increase functional independence with mobility by performin. Supine to sit with Moderate Assistance - not met  2. Rolling to Left with Moderate Assistance. - not met  3. Rolling to Right with Moderate Assistance - not met  4. Sit to stand transfer with Moderate Assistance - not met  5. Sitting at edge of bed x8 minutes with Contact Guard Assistance - not met  6. Lower extremity exercise program x15 reps per handout, with assistance as needed - not met                      Time Tracking:     PT Received On: 20  PT Start Time: 1005     PT Stop Time: 1032  PT Total Time (min): 27 min     Billable Minutes: Therapeutic Activity 27    Treatment Type: Treatment  PT/PTA: PTA     PTA Visit Number: 2       Ann Marie Klein PTA.  Pager [phone]    2020    .

## 2020-02-13 NOTE — PLAN OF CARE
Problem: Adult Inpatient Plan of Care  Goal: Plan of Care Review  Outcome: Ongoing, Progressing     Problem: Adult Inpatient Plan of Care  Goal: Optimal Comfort and Wellbeing  Outcome: Ongoing, Progressing   POC reviewed with patient. Patient verbalized understanding. VSS. Patient NPO. NG tube in right nare. PEG tube in place. Order to resume TF 0800.  No acute events during shift. Safety measures maintained. Bed locked in lowest position, side rails up x 2, bed alarm activated. Call light within reach. Will continue to monitor.

## 2020-02-13 NOTE — PT/OT/SLP PROGRESS
"Occupational Therapy   Treatment    Name: Vaughn Retana  MRN: [id number]  Admitting Diagnosis:  Seizure       Recommendations:     Discharge Recommendations: nursing facility, skilled  Discharge Equipment Recommendations:  (TBD (progress pending))  Barriers to discharge:  Decreased caregiver support(Increased assistance needed)    Assessment:     Vaughn Retana is a 54 y.o. male with a medical diagnosis of Seizure.  He presents with erformance deficits affecting function are impaired endurance, weakness, impaired self care skills, impaired functional mobilty, gait instability, impaired balance, decreased coordination, decreased upper extremity function, decreased lower extremity function, decreased safety awareness, pain, abnormal tone, decreased ROM, impaired coordination, impaired fine motor. Pt with increased mood this session and progressing towards established OT goals, however, still presenting with decreased endurance, increased weakness, poor trunk stability, and decreased ROM. Pt would continue to benefit from acute skilled OT services to increase functional independence.      Rehab Prognosis:  Fair; patient would benefit from acute skilled OT services to address these deficits and reach maximum level of function.       Plan:     Patient to be seen 3 x/week to address the above listed problems via self-care/home management, therapeutic activities, therapeutic exercises, neuromuscular re-education  · Plan of Care Expires: 03/01/20  · Plan of Care Reviewed with: patient    Subjective   "A little better"     Pain/Comfort:  · Pain Rating 1: 0/10    Objective:     Communicated with: RN prior to session.  Patient found HOB elevated listening to music with bed alarm, NG tube, PEG Tube upon OT entry to room. OT first AM attempt, RN initially okay to session, however, after set-up, RN informed OT of medications needing to be given through PEG. Pt agreeable to participate upon OT return.    General Precautions: " Standard, aspiration, fall, NPO, seizure   Orthopedic Precautions:N/A   Braces: N/A     Occupational Performance:     Bed Mobility:    · Patient completed Rolling/Turning to Left with  moderate assistance  · Patient completed Scooting to EOB with moderate assistance  · Patient completed Supine to Sit with moderate assistance  · Patient completed Sit to Supine with moderate assistance     Functional Mobility/Transfers:  · Not assessed this date due to focus of session being on increased trunk stability.    Activities of Daily Living:  · Grooming: stand by assistance pt able to self-suction      [facility] 6 Click ADL: 11    Treatment & Education:  OT role and POC reviewed. Pt with increased alertness and pleasant mood this session, responding more to questions. Pt tolerated session well, performed sitting EOB ~15 minutes with varying assistance between SBA-MAX due to L lateral and posterior lean. Pt requiring mod verbal and tactile cues to self correct and return to midline. Pt performed target hit activity, with B cross of midline to initiate weight shift through trunk, pt unable to perform full ROM to RUE this session due to pain, and resistant to full B weight shift. Pt performed 5 reps of scap retractions with assistance to RUE, observed increased tightness this date. Mod breaks taken this session due to increased need for suction.    Patient left HOB elevated with all lines intact, call button in reach and bed alarm onEducation:      GOALS:   Multidisciplinary Problems     Occupational Therapy Goals        Problem: Occupational Therapy Goal    Goal Priority Disciplines Outcome Interventions   Occupational Therapy Goal     OT, PT/OT Ongoing, Progressing    Description:  Goals to be met by: 2 weeks (2/15/20)     Patient will increase functional independence with ADLs by performing:    Grooming while seated with Moderate Assistance.  Sitting at edge of bed x8 minutes with Contact Guard Assistance.  Supine to sit with  Moderate Assistance. -MET  Squat pivot transfers with Maximum Assistance.   Sit to stand transfers with MOD A (LLE prosthesis present)  Increased functional strength to WFL for ADLs and mobility tasks.  Pt will follow 3/3 one-step commands to participate in ADL task.                        Time Tracking:     OT Date of Treatment: 02/13/20  OT Start Time: 1137  OT Stop Time: 1200  OT Total Time (min): 23 min    Billable Minutes:Therapeutic Activity 11 minutes  Neuromuscular Re-education 12 minutes    RANDELL Davison  2/13/2020

## 2020-02-13 NOTE — PROGRESS NOTES
Hospital Medicine   Progress note      Team: [facility] HOSP MED G Mariposa Dalton MD   Admit Date: 1/20/2020   Hospital Day: 24  JUAN: 2/14/2020   Code status: Full Code   Principal Problem: Seizure     Summary: Summary: Patient is a 54 year old male with extensive medical history including ESRD on dialysis MWF (has not received it today), L below knee amputation 2/2 osteomyelitis, dCHF (last echo 8/2/19 [facility] [place]), GERD, h/o multiple ICH w/o residual deficits, and multiple instances of GI bleed (last EGD 11/12/19, esophagitis) who presents to ED [facility] nursing home due to altered mental status. From NH report, nursing home staff went to wake the patient for his morning dialysis. He was confused, lethargic, had a moderate amount of brown colored emesis in his trash can. Patient last got dialysis on Friday. Patient is normally able to ambulate on his own and is alert and oriented; nursing staff reports that he appeared normal day before admission. Patient admitted to critical care with concerns of new onset seizures and s/p intubation for airway protection. Patient was started on vanc, rocephin, ampicillin, and acyclovir to cover for meningitis. Patient had spot EEG while on propofol in ED which did not show seizure activity. MRI done showed chronic changes but no acute abnormalities. Lumbar puncture was done after MRI. CSF labs were not convincing for bacterial or viral meningitis and antibiotics were weaned down. Continuous EEG was done after propofol was stopped which showed epileptiform discharges. Per epilepsy, patient was given another dose of Keppra. Nephrology consulted and started HD. Pt became febrile the night of 01/22; blood cultures repeated and restarted on Vanc and Zosyn. HSV PCR neg so Acyclovir discontinued.  Neurology was consulted and pt was continued on Keppra but dose was decreased. 01/27 Patient's neuro exam is somewhat improved from day prior per nurse - opening eyes spontaneously and occasionally  "tracking movements, blinking to threat. 01/28 passed SBT today, plan to extubate 01/29. Repeat 24 hr EEG shows "multifocal slowing consistent with multiple areas of focal cortical dysfunction and occasional epileptiform discharges in the left frontotemporal region consistent with a potential seizure focus in this area" with no electrographic seizures. 01/29 Originally planned for extubation, but was deferred in light of BRBPR ~08:30 per nurse. GI consulted, no scope indicated at this time. Otherwise mental status improving in small increments daily. 01/30 Patient successfully extubated, though very weak. Unable to clear secretions effectively at this time, weak cough. 2/1- lethargic, EEG was done with no seizure activity, Keppra dose was decreased.  2/2 - pt was more awake after decreasing Keppra dose further to 250mg BID. Pt able to answer simple questions appropriate and follow simple commands. SLP following but pt continues to fail swallow study. NGT placed. Pt started on tube feeds and tolerating well. He continues to be lethargic at this time and requiring TF through NGT. At this time he will require PEG as he has had NGT TF for several days, with no improvement. IR consult placed. s/p PEG 2/12. Started on tube feeds and tolerating well.     Interval hx:   Pt was seen and examined at bedside. Pt had no acute events overnight, and no new complaints this morning. Pt remained hemodynamically stable and afebrile.  Patient is status post PEG tube placement on 02/12/2020.  He tolerated the procedure well.  Tube feeds were started today via PEG tube and advanced slowly.  Will remove NG tube today.  Patient denies any nausea, vomiting, diarrhea, constipation, blood in stools or trouble urinating. Pt denies any fevers, chills, malaise, headaches, chest pain.     ROS (Positive in Bold, otherwise negative)  Constitutional: fever, chills, night sweats  CV: chest pain, edema, palpitations  Resp: SOB, cough, sputum " production  GI: changes in appetite, NVDC, pain, melena, hematochezia, GERD, hematemesis  : Dysuria, hematuria, urinary urgency, frequency  MSK: arthralgia/myalgia, joint swelling  Neuro/Psych: anxiety, depression    PEx   Temp:  [96.1 °F (35.6 °C)-98.6 °F (37 °C)]   Pulse:  [88-99]   Resp:  [16-18]   BP: (123-177)/(78-90)   SpO2:  [96 %-100 %]      I & O (Last 24H):     Intake/Output Summary (Last 24 hours) at 2/13/2020 1631  Last data filed at 2/13/2020 1400  Gross per 24 hour   Intake 110 ml   Output 910 ml   Net -800 ml       General:  male  in no acute distress. Nontoxic. Resting in bed. Cooperative.  HEENT: NCAT. PERRL. EOMI. Sclera Anicteric.  CVS: RRR. Normal S1 S2. No murmurs  Pulm: CTAB. Normal respiratory effort. No wheezes, rhonchi, or crackles.  Abdomen: Soft. Non-distended. No tenderness to palpation. No rebound or guarding. +BS.  Peg tube in place  Extremities: No edema. No cyanosis. Full ROM.  Neuro: Alert, oriented x 3, Spont mvt of all extremities with no focal deficits noted. Globally weak    Recent Results (from the past 24 hour(s))   POCT glucose    Collection Time: 02/12/20  6:03 PM   Result Value Ref Range    POCT Glucose 77 70 - 110 mg/dL   POCT glucose    Collection Time: 02/12/20 11:55 PM   Result Value Ref Range    POCT Glucose 74 70 - 110 mg/dL   Magnesium    Collection Time: 02/13/20  4:03 AM   Result Value Ref Range    Magnesium 2.2 1.6 - 2.6 mg/dL   Renal function panel    Collection Time: 02/13/20  4:03 AM   Result Value Ref Range    Glucose 67 (L) 70 - 110 mg/dL    Sodium 137 136 - 145 mmol/L    Potassium 3.7 3.5 - 5.1 mmol/L    Chloride 98 95 - 110 mmol/L    CO2 24 23 - 29 mmol/L    BUN, Bld 32 (H) 6 - 20 mg/dL    Calcium 10.4 8.7 - 10.5 mg/dL    Creatinine 4.8 (H) 0.5 - 1.4 mg/dL    Albumin 2.4 (L) 3.5 - 5.2 g/dL    Phosphorus 3.7 2.7 - 4.5 mg/dL    eGFR if African American 14.6 (A) >60 mL/min/1.73 m^2    eGFR if non  12.7 (A) >60 mL/min/1.73 m^2     Anion Gap 15 8 - 16 mmol/L   CBC Without Differential    Collection Time: 02/13/20  4:03 AM   Result Value Ref Range    WBC 6.75 3.90 - 12.70 K/uL    RBC 4.37 (L) 4.60 - 6.20 M/uL    Hemoglobin 12.5 (L) 14.0 - 18.0 g/dL    Hematocrit 37.9 (L) 40.0 - 54.0 %    Mean Corpuscular Volume 87 82 - 98 fL    Mean Corpuscular Hemoglobin 28.6 27.0 - 31.0 pg    Mean Corpuscular Hemoglobin Conc 33.0 32.0 - 36.0 g/dL    RDW 15.9 (H) 11.5 - 14.5 %    Platelets 169 150 - 350 K/uL    MPV 11.6 9.2 - 12.9 fL   POCT glucose    Collection Time: 02/13/20  5:34 AM   Result Value Ref Range    POCT Glucose 81 70 - 110 mg/dL   POCT glucose    Collection Time: 02/13/20 11:18 AM   Result Value Ref Range    POCT Glucose 76 70 - 110 mg/dL   POCT glucose    Collection Time: 02/13/20 11:19 AM   Result Value Ref Range    POCT Glucose 73 70 - 110 mg/dL       Recent Labs   Lab 02/12/20  1359 02/12/20  1803 02/12/20  2355 02/13/20  0534 02/13/20  1118 02/13/20  1119   POCTGLUCOSE 85 77 74 81 76 73       Hemoglobin A1C   Date Value Ref Range Status   02/06/2020 4.7 4.0 - 5.6 % Final     Comment:     ADA Screening Guidelines:  5.7-6.4%  Consistent with prediabetes  >or=6.5%  Consistent with diabetes  High levels of fetal hemoglobin interfere with the HbA1C  assay. Heterozygous hemoglobin variants (HbS, HgC, etc)do  not significantly interfere with this assay.   However, presence of multiple variants may affect accuracy.     11/09/2019 4.0 4.0 - 5.6 % Final     Comment:     ADA Screening Guidelines:  5.7-6.4%  Consistent with prediabetes  >or=6.5%  Consistent with diabetes  High levels of fetal hemoglobin interfere with the HbA1C  assay. Heterozygous hemoglobin variants (HbS, HgC, etc)do  not significantly interfere with this assay.   However, presence of multiple variants may affect accuracy.     06/22/2019 4.7 4.0 - 5.6 % Final     Comment:     ADA Screening Guidelines:  5.7-6.4%  Consistent with prediabetes  >or=6.5%  Consistent with diabetes  High  levels of fetal hemoglobin interfere with the HbA1C  assay. Heterozygous hemoglobin variants (HbS, HgC, etc)do  not significantly interfere with this assay.   However, presence of multiple variants may affect accuracy.     06/22/2019 4.7 4.0 - 5.6 % Final     Comment:     ADA Screening Guidelines:  5.7-6.4%  Consistent with prediabetes  >or=6.5%  Consistent with diabetes  High levels of fetal hemoglobin interfere with the HbA1C  assay. Heterozygous hemoglobin variants (HbS, HgC, etc)do  not significantly interfere with this assay.   However, presence of multiple variants may affect accuracy.          Active Hospital Problems    Diagnosis  POA    *Seizure [R56.9]  Yes    ESRD on dialysis [N18.6, Z99.2]  Not Applicable    Aphasia [R47.01]  Clinically Undetermined    Pressure injury due to medical device [T85.898A, L89.90]  Yes    Altered mental status [R41.82]  Yes    Gastrointestinal hemorrhage with hematemesis [K92.0]  Yes    GERD with esophagitis [K21.0]  Yes    Severe malnutrition [E43]  Yes     Assessment and Plan  Severe Malnutrition in the context of Social/Environmental Circumstances     Related to (etiology):  Unknown etiology     Signs and Symptoms (as evidenced by):  Energy Intake: per pt, <75% intake over the last year  Body Fat Depletion: overall subcutaneous fat loss, moderate triceps fat loss and protruding patellas.  Muscle Mass Depletion:  moderate muscle wasting of interosseous, temporal, clavicle, calves and quadriceps  Weight Loss: 24% (39 lbs) x 1 year    Recommendations     Recommendation/Intervention: 1. Should pt be cleared for po diet, recommend renal diet with texture per SLP along with Novasource ONS TID. 2. Should pt require enteral feeding, initiate Novasource renal formula at 10ml/hr and advance 10ml Q4hrs to goal rate of 40ml/hr (provides 1920kcal, 87g pro, 691ml free water). Provide additional 500ml water flush/24 hrs. Hold for residuals >500ml.  Goals: 1. Pt to receive  nutrition < 48 hrs.      Renovascular hypertension [I15.0]  Yes     Chronic    Chronic diastolic heart failure [I50.32]  Yes     Chronic     2-23-17    1 - Low normal to mildly depressed left ventricular systolic function (EF 50-55%).     2 - Right ventricular enlargement with mildly depressed systolic function.     3 - Left ventricular diastolic dysfunction.     4 - Right atrial enlargement.     5 - Severe tricuspid regurgitation.     6 - Pulmonary hypertension. The estimated PA systolic pressure is 86 mmHg.     7 - Increased central venous pressure.       Encephalopathy [G93.40]  Yes      Resolved Hospital Problems    Diagnosis Date Resolved POA    Acute metabolic encephalopathy [G93.41] 01/23/2020 Yes    ESRD on hemodialysis [N18.6, Z99.2] 01/31/2020 Not Applicable     Chronic     MWF at [facility]            Assessment and Plan for problems addressed today:      [START ON 2/14/2020] sodium chloride 0.9%   Intravenous Once    amoxicillin-clavulanate 500-125mg  1 tablet Per NG tube Daily    insulin detemir U-100  7 Units Subcutaneous Daily    levetiracetam oral soln  250 mg Per G Tube BID    midodrine  5 mg Per NG tube Every Mon, Wed, Fri    pantoprazole  40 mg Oral Daily    polyethylene glycol  17 g Per NG tube Daily     acetaminophen, albuterol-ipratropium, bisacodyL, Dextrose 10% Bolus, Dextrose 10% Bolus, glucagon (human recombinant), glucose, glucose, insulin aspart U-100, ondansetron, polyethylene glycol, prochlorperazine, senna-docusate 8.6-50 mg, sodium chloride 0.9%    Acute encephalopathy: resolved  Seizures:  -Patient initially presented to ED prior to getting dialysis due to AMS and brown emesis. Had witnessed tonic clonic seizure requiring ativan shortly after getting a CT head. He was loaded with 1500 mg Keppra, intubated for airway protection, and started on propofol for sedation. Upon physical exam, patient remained unresponsive to verbal or tactile stimuli and had upward left sided  gaze with sluggish pupils. Concern for status epilepticus.    -UDS negative, alcohol level wnl  -Sepsis: Initial lactate 2.4, likely due to seizure event; repeat was 1.9. Patient received 500 ml saline in ED. Initial blood cultures, sputum culture+gram stain neg  Lumbar puncture done, CSF not convincing for meningitis, so ampicillin, vancomycin, rocephin d/c'd on 01/22. However, pt was febrile on 11/23 to 101.3. Blood cultures repeated and restarted on Vanc and Zosyn. Repeat cultures NGTD. HSV PCR neg; Acyclovir discontinued. Pt has remained afebrile. Vanc and Zosyn discontinued 01/26.   -Intracranial: patient with history of ICH with no functional deficits. Possibly multiple foci for seizure overtime. CT without acute changes, MRI brain with chronic changes and hypertensive angiopathy; no acute changes.  - PRES: Patient off cardene drip. MRI reviewed. Will follow blood pressure as patient is normally hypotensive.   -Spot EEG without evidence of current seizure. However patient already received keppra, ativan, and sedated on propofol. 24 hour EEG with L sided structural lesion and underlying epileptiform potential. Initiated 1500 mg keppra on 01/22, per Neurology recs.  -Neurology consulted. repeat EEG on 01/27 shows diffuse slowing, though no focal activity seen on EEG.   -01/28 patient's mental status is continuing to improve day to day, now following commands. 01/30 Patient is alert and following commands. Passed SBT and extubated. Breathing well though with generalized weakness as well as weak cough.   -Mental status continues to improve very gradually daily   -continue keppra 250mg BID per neuro recs   -continue aspiration precautions, fall precautions, seizure precaution  -neuro checks  -work with OT/PT/SLP, globally weak, had prolonged hospitalization and ICU stay, possible critical illness myopathy/neuropathy present     Acute febrile episode:  Resolved  Aspiration PNA  - Tm 100.8 on 02/11/2020 with CXR  concerning of aspiration. He has had significant sputum int he past 48 hrs  - since HDS, patient was started oral Augmentin renally dosed for 7 days total (2/18 - end date)  -now remains afebrile, hemodynamically stable.     Oropharyngeal dysphagia:  -likely secondary to acute encephalopathy and possible critical illness myopathy/neuropathy from prolonged hospital stay/ICU stay  -SLP consulted, recommending to keep patient NPO.  NG tube was placed during this admission and patient was getting tube feeds via the NG tube.  -patient continue to work with SLP, however was unable to advance his diet due to overall weakness.  -IR was consulted, patient is s/p peg tube placement on 02/12/2020  -start tube feeds, advance as tolerated  -remove NG tube today  -aspiration precaution  -continue to work with SLP, advance diet as tolerated     Renovascular hypertension  -Patient on coreg at nursing home; has been compliant as given by nursing home. Patient with hypertensive emergency on admit and started Cardene drip to titrate to BP of 160-180. Off Cardene drip since 01/20.   -Goal SBP < 160 per Neurology.   -Due to soft BP, dc home carvedilol 3.125 mg BID for now  -Optimal off meds at this time, he get midodrine with piror to HD     ESRD on HD:  -Pt undergoes HD MWF outpatient  -Consult nephrology to continue HD inpatient   -Renally dose medications and avoid nephrotoxic medications to preserve residual renal function   -Strict I/O's and daily weights  -Continue to monitor renal function with daily labs      Chronic diastolic heart failure  -[facility], EF>55%, bi-atrial enlargement  -No signs of exacerbation, appears dry  -Continue to monitor on telemetry  -Monitor intake and output and obtain daily STANDING weights  -Fluid restriction to 1.5L per day and cardiac/renal diet with salt restriction  -Supplemental O2 to keep Spo2 >90%     Severe malnutrition:  -Nutrition consulted  -Novasource @ 44 mL/hr to  provide 1440 kcals, 65 g of protein, 516 mL fluid.   -Continue TFs     Hematemesis:  -Patient with reports of coffee ground emesis prior to transport to ED. In ED, dark brown contents suctioned from stomach. Hemoglobin remains stable and pt does not have any HD instability. Patient is well known to GI. His most recent scope was in November that just showed esophagitis similar to his previous EGD.   -GI consulted and appreciate recommendations. No scope indicated at this time  -continue protonix po daily  -continue to monitor H&H daily  -transfuse for hemoglobin under 7  -Nursing / Phlebotomy to use pediatric tubes when drawing labs to minimize iatrogenic anemia and blood loss.      Hx GERD with esophagitis  -Continue Protonix  -Follow up with GI out patient     DVT PPx: scds    Discharge plan and follow up: DC to snf once medically stable     Mariposa Dalton MD  Hospital Medicine Staff  [phone] pager

## 2020-02-13 NOTE — PLAN OF CARE
Continue OT plan of care.    Problem: Occupational Therapy Goal  Goal: Occupational Therapy Goal  Description  Goals to be met by: 2 weeks (2/15/20)     Patient will increase functional independence with ADLs by performing:    Grooming while seated with Moderate Assistance.  Sitting at edge of bed x8 minutes with Contact Guard Assistance.  Supine to sit with Moderate Assistance. -MET  Squat pivot transfers with Maximum Assistance.   Sit to stand transfers with MOD A (LLE prosthesis present)  Increased functional strength to WFL for ADLs and mobility tasks.  Pt will follow 3/3 one-step commands to participate in ADL task.       Outcome: Ongoing, Progressing

## 2020-02-13 NOTE — PROGRESS NOTES
Hospital Medicine   Progress note      Team: [facility] HOSP MED G Jaskaran Colon MD   Admit Date: 1/20/2020   Hospital Day: 23  JUAN: 2/14/2020   Code status: Full Code   Principal Problem: Seizure     Summary: Patient is a 54 year old male with extensive medical history including ESRD on dialysis MWF (has not received it today), L below knee amputation 2/2 osteomyelitis, [place]CH[place] ([place] [facility] [place] GERD, h/o multiple ICH w/o residual deficits, and multiple instances of GI bleed (last EGD 11/12/19, esophagitis) who presents to ED [facility] nursing home due to altered mental status. From NH report, nursing home staff went to wake the patient for his morning dialysis. He was confused, lethargic, had a moderate amount of brown colored emesis in his trash can. Patient last got dialysis on Friday. Patient is normally able to ambulate on his own and is alert and oriented; nursing staff reports that he appeared normal day before admission. Patient admitted to critical care with concerns of new onset seizures and s/p intubation for airway protection. Patient was started on vanc, rocephin, ampicillin, and acyclovir to cover for meningitis. Patient had spot EEG while on propofol in ED which did not show seizure activity. MRI done showed chronic changes but no acute abnormalities. Lumbar puncture was done after MRI. CSF labs were not convincing for bacterial or viral meningitis and antibiotics were weaned down. Continuous EEG was done after propofol was stopped which showed epileptiform discharges. Per epilepsy, patient was given another dose of Keppra. Nephrology consulted and started HD. Pt became febrile the night of 01/22; blood cultures repeated and restarted on Vanc and Zosyn. HSV PCR neg so Acyclovir discontinued.  Neurology was consulted and pt was continued on Keppra but dose was decreased. 01/27 Patient's neuro exam is somewhat improved from day prior per nurse - opening eyes spontaneously and occasionally tracking  "movements, blinking to threat.  passed SBT today, plan to extubate . Repeat 24 hr EEG shows "multifocal slowing consistent with multiple areas of focal cortical dysfunction and occasional epileptiform discharges in the left frontotemporal region consistent with a potential seizure focus in this area" with no electrographic seizures.  Originally planned for extubation, but was deferred in light of BRBPR ~08:30 per nurse. GI consulted, no scope indicated at this time. Otherwise mental status improving in small increments daily.  Patient successfully extubated, though very weak. Unable to clear secretions effectively at this time, weak cough. - lethargic, EEG was done with no seizure activity, Keppra dose was decreased.   - pt was more awake after decreasing Keppra dose further to 250mg BID. Pt able to answer simple questions appropriate and follow simple commands. SLP following but pt continues to fail swallow study. NGT placed. Pt started on tube feeds and tolerating well. He continues to be lethargic at this time and requiring TF through NGT. At this time he will require PEG as he has had NGT TF for several days, with no improvement. IR consult placed. Plan for PEG .     Eventual d/c plan will be SNF with PEG and TFs.    Interval hx:   Pt was seen and examined at bedside. Tm 100.8. CXR with perihilar opacities concerning for aspiration vs PNA. He continues to have significant amount of sputum in the last 48 hrs. Will start oral abx for now as he is otherwise stable. Alert and oriented x 3. Mentation stable. Able to state his his name, , place, month and year. He responds very slowly. Continues to be weakness. PEG planned for today.      ROS (Positive in Bold, otherwise negative)  Constitutional: fever, chills, night sweats. Generalized weakness   CV: chest pain, edema, palpitations  Resp: SOB, cough, sputum production  GI: dysphagia, changes in appetite, NVDC, pain, melena, " hematochezia, GERD, hematemesis  : Dysuria, hematuria, urinary urgency, frequency  MSK: arthralgia/myalgia, joint swelling  Neuro/Psych: confusion (resolved), anxiety, depression, seizure     PEx   Temp:  [96.9 °F (36.1 °C)-100.8 °F (38.2 °C)]   Pulse:  []   Resp:  [15-25]   BP: ()/(41-88)   SpO2:  [93 %-100 %]      I & O (Last 24H):     Intake/Output Summary (Last 24 hours) at 2/12/2020 2017  Last data filed at 2/12/2020 1800  Gross per 24 hour   Intake 1000 ml   Output 1014 ml   Net -14 ml       General:  male  in no acute distress. Alert. Appears chronically ill. Resting in bed. Cooperative.  Aox3  HEENT: NCAT. PERRL. EOMI. Sclera Anicteric. NG tube in place  CVS: RRR. Normal S1 S2. No murmurs  Pulm: CTAB. Normal respiratory effort. No wheezes, rhonchi, or crackles.  Abdomen: Soft. Non-distended. No tenderness to palpation. No rebound or guarding. +BS.  Extremities: No edema. No cyanosis. Full ROM.  Globally weak. Left BKA  Neuro: Alert, oriented x 3, Spont mvt of all extremities with no focal deficits noted. Globally weak.  Able to answer questions appropriately and follow simple commands.      Recent Results (from the past 24 hour(s))   POCT glucose    Collection Time: 02/12/20 12:30 AM   Result Value Ref Range    POCT Glucose 123 (H) 70 - 110 mg/dL   POCT glucose    Collection Time: 02/12/20  5:51 AM   Result Value Ref Range    POCT Glucose 136 (H) 70 - 110 mg/dL   Magnesium    Collection Time: 02/12/20  6:51 AM   Result Value Ref Range    Magnesium 2.3 1.6 - 2.6 mg/dL   Renal function panel    Collection Time: 02/12/20  6:51 AM   Result Value Ref Range    Glucose 119 (H) 70 - 110 mg/dL    Sodium 133 (L) 136 - 145 mmol/L    Potassium 4.0 3.5 - 5.1 mmol/L    Chloride 93 (L) 95 - 110 mmol/L    CO2 25 23 - 29 mmol/L    BUN, Bld 72 (H) 6 - 20 mg/dL    Calcium 10.3 8.7 - 10.5 mg/dL    Creatinine 7.2 (H) 0.5 - 1.4 mg/dL    Albumin 2.3 (L) 3.5 - 5.2 g/dL    Phosphorus 2.6 (L) 2.7 - 4.5  mg/dL    eGFR if  9.0 (A) >60 mL/min/1.73 m^2    eGFR if non  7.8 (A) >60 mL/min/1.73 m^2    Anion Gap 15 8 - 16 mmol/L   CBC Without Differential    Collection Time: 02/12/20  6:51 AM   Result Value Ref Range    WBC 9.32 3.90 - 12.70 K/uL    RBC 3.90 (L) 4.60 - 6.20 M/uL    Hemoglobin 11.0 (L) 14.0 - 18.0 g/dL    Hematocrit 33.5 (L) 40.0 - 54.0 %    Mean Corpuscular Volume 86 82 - 98 fL    Mean Corpuscular Hemoglobin 28.2 27.0 - 31.0 pg    Mean Corpuscular Hemoglobin Conc 32.8 32.0 - 36.0 g/dL    RDW 15.9 (H) 11.5 - 14.5 %    Platelets 174 150 - 350 K/uL    MPV 12.1 9.2 - 12.9 fL   Culture, Respiratory with Gram Stain    Collection Time: 02/12/20  9:08 AM   Result Value Ref Range    Gram Stain (Respiratory) <10 epithelial cells per low power field.     Gram Stain (Respiratory) Rare WBC's     Gram Stain (Respiratory) Moderate Gram positive cocci    POCT glucose    Collection Time: 02/12/20 11:32 AM   Result Value Ref Range    POCT Glucose 165 (H) 70 - 110 mg/dL   POCT glucose    Collection Time: 02/12/20  1:59 PM   Result Value Ref Range    POCT Glucose 85 70 - 110 mg/dL   POCT glucose    Collection Time: 02/12/20  6:03 PM   Result Value Ref Range    POCT Glucose 77 70 - 110 mg/dL       Recent Labs   Lab 02/11/20  1808 02/12/20  0030 02/12/20  0551 02/12/20  1132 02/12/20  1359 02/12/20  1803   POCTGLUCOSE 147* 123* 136* 165* 85 77       Hemoglobin A1C   Date Value Ref Range Status   02/06/2020 4.7 4.0 - 5.6 % Final     Comment:     ADA Screening Guidelines:  5.7-6.4%  Consistent with prediabetes  >or=6.5%  Consistent with diabetes  High levels of fetal hemoglobin interfere with the HbA1C  assay. Heterozygous hemoglobin variants (HbS, HgC, etc)do  not significantly interfere with this assay.   However, presence of multiple variants may affect accuracy.     11/09/2019 4.0 4.0 - 5.6 % Final     Comment:     ADA Screening Guidelines:  5.7-6.4%  Consistent with prediabetes  >or=6.5%   Consistent with diabetes  High levels of fetal hemoglobin interfere with the HbA1C  assay. Heterozygous hemoglobin variants (HbS, HgC, etc)do  not significantly interfere with this assay.   However, presence of multiple variants may affect accuracy.     06/22/2019 4.7 4.0 - 5.6 % Final     Comment:     ADA Screening Guidelines:  5.7-6.4%  Consistent with prediabetes  >or=6.5%  Consistent with diabetes  High levels of fetal hemoglobin interfere with the HbA1C  assay. Heterozygous hemoglobin variants (HbS, HgC, etc)do  not significantly interfere with this assay.   However, presence of multiple variants may affect accuracy.     06/22/2019 4.7 4.0 - 5.6 % Final     Comment:     ADA Screening Guidelines:  5.7-6.4%  Consistent with prediabetes  >or=6.5%  Consistent with diabetes  High levels of fetal hemoglobin interfere with the HbA1C  assay. Heterozygous hemoglobin variants (HbS, HgC, etc)do  not significantly interfere with this assay.   However, presence of multiple variants may affect accuracy.          Active Hospital Problems    Diagnosis  POA    *Seizure [R56.9]  Yes    ESRD on dialysis [N18.6, Z99.2]  Not Applicable    Aphasia [R47.01]  Clinically Undetermined    Pressure injury due to medical device [T85.898A, L89.90]  Yes    Altered mental status [R41.82]  Yes    Gastrointestinal hemorrhage with hematemesis [K92.0]  Yes    GERD with esophagitis [K21.0]  Yes    Severe malnutrition [E43]  Yes     Assessment and Plan  Severe Malnutrition in the context of Social/Environmental Circumstances     Related to (etiology):  Unknown etiology     Signs and Symptoms (as evidenced by):  Energy Intake: per pt, <75% intake over the last year  Body Fat Depletion: overall subcutaneous fat loss, moderate triceps fat loss and protruding patellas.  Muscle Mass Depletion:  moderate muscle wasting of interosseous, temporal, clavicle, calves and quadriceps  Weight Loss: 24% (39 lbs) x 1  year    Recommendations     Recommendation/Intervention: 1. Should pt be cleared for po diet, recommend renal diet with texture per SLP along with Novasource ONS TID. 2. Should pt require enteral feeding, initiate Novasource renal formula at 10ml/hr and advance 10ml Q4hrs to goal rate of 40ml/hr (provides 1920kcal, 87g pro, 691ml free water). Provide additional 500ml water flush/24 hrs. Hold for residuals >500ml.  Goals: 1. Pt to receive nutrition < 48 hrs.      Renovascular hypertension [I15.0]  Yes     Chronic    Chronic diastolic heart failure [I50.32]  Yes     Chronic     2-23-17    1 - Low normal to mildly depressed left ventricular systolic function (EF 50-55%).     2 - Right ventricular enlargement with mildly depressed systolic function.     3 - Left ventricular diastolic dysfunction.     4 - Right atrial enlargement.     5 - Severe tricuspid regurgitation.     6 - Pulmonary hypertension. The estimated PA systolic pressure is 86 mmHg.     7 - Increased central venous pressure.       Encephalopathy [G93.40]  Yes      Resolved Hospital Problems    Diagnosis Date Resolved POA    Acute metabolic encephalopathy [G93.41] 01/23/2020 Yes    ESRD on hemodialysis [N18.6, Z99.2] 01/31/2020 Not Applicable     Chronic     MWF at [facility]            Assessment and Plan for problems addressed today:      amoxicillin-clavulanate 500-125mg  1 tablet Per NG tube Daily    insulin detemir U-100  7 Units Subcutaneous Daily    levetiracetam oral soln  250 mg Per G Tube BID    midodrine  5 mg Per NG tube Every Mon, Wed, Fri    pantoprazole  40 mg Oral Daily    polyethylene glycol  17 g Per NG tube Daily     acetaminophen, albuterol-ipratropium, bisacodyL, Dextrose 10% Bolus, Dextrose 10% Bolus, glucagon (human recombinant), glucose, glucose, insulin aspart U-100, ondansetron, polyethylene glycol, prochlorperazine, senna-docusate 8.6-50 mg, sodium chloride 0.9%    Acute encephalopathy: resolved  Seizures:  -Patient  initially presented to ED prior to getting dialysis due to AMS and brown emesis. Had witnessed tonic clonic seizure requiring ativan shortly after getting a CT head. He was loaded with 1500 mg Keppra, intubated for airway protection, and started on propofol for sedation. Upon physical exam, patient remained unresponsive to verbal or tactile stimuli and had upward left sided gaze with sluggish pupils. Concern for status epilepticus.    -UDS negative, alcohol level wnl  -Sepsis: Initial lactate 2.4, likely due to seizure event; repeat was 1.9. Patient received 500 ml saline in ED. Initial blood cultures, sputum culture+gram stain neg  Lumbar puncture done, CSF not convincing for meningitis, so ampicillin, vancomycin, rocephin d/c'd on 01/22. However, pt was febrile on 11/23 to 101.3. Blood cultures repeated and restarted on Vanc and Zosyn. Repeat cultures NGTD. HSV PCR neg; Acyclovir discontinued. Pt has remained afebrile. Vanc and Zosyn discontinued 01/26.   -Intracranial: patient with history of ICH with no functional deficits. Possibly multiple foci for seizure overtime. CT without acute changes, MRI brain with chronic changes and hypertensive angiopathy; no acute changes.  - PRES: Patient off cardene drip. MRI reviewed. Will follow blood pressure as patient is normally hypotensive.   -Spot EEG without evidence of current seizure. However patient already received keppra, ativan, and sedated on propofol. 24 hour EEG with L sided structural lesion and underlying epileptiform potential. Initiated 1500 mg keppra on 01/22, per Neurology recs.  -Neurology consulted. repeat EEG on 01/27 shows diffuse slowing, though no focal activity seen on EEG.   -01/28 patient's mental status is continuing to improve day to day, now following commands. 01/30 Patient is alert and following commands. Passed SBT and extubated. Breathing well though with generalized weakness as well as weak cough.   -Mental status continues to improve very  gradually daily   -continue keppra 250mg BID per neuro recs   -continue aspiration precautions, fall precautions, seizure precaution  -neuro checks  -work with OT/PT/SLP, globally weak, had prolonged hospitalization and ICU stay, possible critical illness myopathy/neuropathy present    Acute febrile episode  Aspiration PNA  - Tm 100.8 with CXR concerning of aspiration. He has had significant sputum int he past 48 hrs  - since HDS, start oral Augmentin renally dosed for 7 days total (2/18 - end date)    Oropharyngeal dysphagia:  -likely secondary to acute encephalopathy and possible critical illness myopathy/neuropathy from prolonged hospital stay/ICU stay  -SLP consulted, recommending to keep patient NPO for now  -NG tube placed, continue tube feeds, nutrition consulted  -aspiration precaution  -continue to work with SLP, advance diet as tolerated, remains NPO as per SLP  -IR consulted for PEG, planned for today  -If no signs of infection or peritonitis, can start trickle feeding 2/13 morning and d/c NGT tomorrow     Renovascular hypertension  -Patient on coreg at nursing home; has been compliant as given by nursing home. Patient with hypertensive emergency on admit and started Cardene drip to titrate to BP of 160-180. Off Cardene drip since 01/20.   -Goal SBP < 160 per Neurology.   -Due to soft BP, dc home carvedilol 3.125 mg BID for now  -Optimal off meds at this time, he get midodrine with piror to HD    ESRD on HD:  -Pt undergoes HD MWF outpatient  -Consult nephrology to continue HD inpatient   -Renally dose medications and avoid nephrotoxic medications to preserve residual renal function   -Strict I/O's and daily weights  -Continue to monitor renal function with daily labs     Chronic diastolic heart failure  -Last echo done at [facility] 8/2/19, EF>55%, bi-atrial enlargement  -No signs of exacerbation, appears dry  -Continue to monitor on telemetry  -Monitor intake and output and obtain daily STANDING  weights  -Fluid restriction to 1.5L per day and cardiac/renal diet with salt restriction  -Supplemental O2 to keep Spo2 >90%    Severe malnutrition:  -Nutrition consulted  -Novasource @ 44 mL/hr to provide 1440 kcals, 65 g of protein, 516 mL fluid.   -Continue TFs     Hematemesis:  -Patient with reports of coffee ground emesis prior to transport to ED. In ED, dark brown contents suctioned from stomach. Hemoglobin remains stable and pt does not have any HD instability. Patient is well known to GI. His most recent scope was in November that just showed esophagitis similar to his previous EGD.   -GI consulted and appreciate recommendations. No scope indicated at this time  -continue protonix po daily  -continue to monitor H&H daily  -transfuse for hemoglobin under 7  -Nursing / Phlebotomy to use pediatric tubes when drawing labs to minimize iatrogenic anemia and blood loss.      Hx GERD with esophagitis  -Continue Protonix  -Follow up with GI out patient           Discharge plan and follow up: DC to SNF once medically stable    Jaskaran Colon MD  Hospital Medicine Staff  [phone] pager

## 2020-02-13 NOTE — PLAN OF CARE
Problem: Physical Therapy Goal  Goal: Physical Therapy Goal  Description  Goals to be met by: 2020    Patient will increase functional independence with mobility by performin. Supine to sit with Moderate Assistance - not met  2. Rolling to Left with Moderate Assistance. - not met  3. Rolling to Right with Moderate Assistance - not met  4. Sit to stand transfer with Moderate Assistance - not met  5. Sitting at edge of bed x8 minutes with Contact Guard Assistance - not met  6. Lower extremity exercise program x15 reps per handout, with assistance as needed - not met     Outcome: Ongoing, Progressing       Discharge Recommendations: SNF in NH    Pt requires mod A to sit at the EOB.    Goals remain appropriate.     Ann Marie Klein, PTA.   [phone]   2020

## 2020-02-13 NOTE — PROGRESS NOTES
Wound consult received on patient. Moisture associated dermatitis noted to scrotum and intertrigo due to moisture noted to perirectal area. Nursing currently using barrier cream. Recommend continuing BID/prn.  Nursing to continue care. Re-consult wound care if needed.     02/13/20 1207        Wound 02/08/20 Moisture associated dermatitis Scrotum   Date First Assessed: 02/08/20   Primary Wound Type: Moisture associated dermatitis  Location: Scrotum   Wound Image   (media file unavailable)   Wound WDL ex   Drainage Amount None   Appearance Pink   Tissue loss description Not applicable   Periwound Area Intact;Moist   Care Skin Barrier        Wound 02/11/20 0000 Intertrigo Perirectal   Date First Assessed/Time First Assessed: 02/11/20 0000   Primary Wound Type: Intertrigo  Location: Perirectal   Wound Image   (media file unavailable)   Wound WDL ex   Drainage Amount None   Drainage Characteristics/Odor No odor   Appearance Moist;Pink   Tissue loss description Partial thickness   Periwound Area Intact   Wound Length (cm) 0.5 cm   Wound Width (cm) 0.2 cm   Wound Depth (cm) 0.1 cm   Wound Volume (cm^3) 0.01 cm^3   Wound Surface Area (cm^2) 0.1 cm^2   Care Skin Barrier   Skin Interventions   Pressure Reduction Devices pressure-redistributing mattress utilized  (waffle overlay)

## 2020-02-13 NOTE — PLAN OF CARE
02/13/20 1434   Discharge Reassessment   Assessment Type Discharge Planning Reassessment   Provided patient/caregiver education on the expected discharge date and the discharge plan Yes   Do you have any problems affording any of your prescribed medications? No   Discharge Plan A Skilled Nursing Facility   Discharge Plan B Return to Nursing Home   DME Needed Upon Discharge  none   Patient choice form signed by patient/caregiver N/A   Anticipated Discharge Disposition SNF   Can the patient/caregiver answer the patient profile reliably? No, cognitively impaired   How does the patient rate their overall health at the present time? Poor   Describe the patient's ability to walk at the present time. Does not walk or unable to take any steps at all   How often would a person be available to care for the patient? Often   Number of comorbid conditions (as recorded on the chart) Five or more   Post-Acute Status   Post-Acute Authorization Placement   Post-Acute Placement Status Awaiting Internal Medical Clearance   Discharge Delays (!) Diet Not Ready for Discharge  (PEG 2/12/2020)     PEG placed 2/12/2020, waiting for feeding goal    Milagro Madrid RN  Case Management  Ext: [phone]  02/13/2020  2:35 PM

## 2020-02-13 NOTE — NURSING
notified  MD Sha of patient having emesis at this time. Holding and removed 175 cc brown fluid from PEG and unmeasured amount of brown emesis on patient.   Ordered to decrease tube feeds to 10cc/hr, was previously at 20 cc, and to continue trickle feeds throughout night and that scheduled zofran would be ordered.     Patient cleaned and tube feeds restarted at 10cc/hr

## 2020-02-14 PROBLEM — K92.2 ACUTE UPPER GI BLEED: Status: ACTIVE | Noted: 2020-01-01

## 2020-02-14 NOTE — SUBJECTIVE & OBJECTIVE
Interval History/Significant Events: Patient transferred back to ICU evening of 2/13 following recurrence of hematemesis and UGIB. No further episodes of coffee ground emesis since transfer to ICU. Down-trend in hgb noted (12.5 > 11.9 > 10.6), however patient remained hemodynamically stable.     Review of Systems   Unable to perform ROS: Patient nonverbal (Pt selectively non-verbal. Nods appropriately to questions. Has been conversant with nursing staff)     Objective:     Vital Signs (Most Recent):  Temp: 99.3 °F (37.4 °C) (02/13/20 2305)  Pulse: 92 (02/14/20 0305)  Resp: 17 (02/14/20 0305)  BP: 122/65 (02/14/20 0305)  SpO2: 100 % (02/14/20 0305) Vital Signs (24h Range):  Temp:  [96.1 °F (35.6 °C)-99.3 °F (37.4 °C)] 99.3 °F (37.4 °C)  Pulse:  [] 92  Resp:  [11-26] 17  SpO2:  [92 %-100 %] 100 %  BP: (122-149)/(64-99) 122/65   Weight: 51.8 kg (114 lb 3.2 oz)  Body mass index is 18.43 kg/m².      Intake/Output Summary (Last 24 hours) at 2/14/2020 0325  Last data filed at 2/13/2020 1730  Gross per 24 hour   Intake 160 ml   Output 925 ml   Net -765 ml       Physical Exam   Constitutional: He is oriented to person, place, and time. He appears well-developed. No distress.   Chronically ill appearing   HENT:   Head: Normocephalic and atraumatic.   Right Ear: External ear normal.   Left Ear: External ear normal.   Nose: Nose normal.   Mouth/Throat: Oropharynx is clear and moist.   Eyes: Conjunctivae and EOM are normal.   Neck: Normal range of motion. Neck supple.   Cardiovascular: Normal rate, regular rhythm, normal heart sounds and intact distal pulses.   RUE AV fistula, +bruit, +thrill   Pulmonary/Chest: Effort normal and breath sounds normal. No respiratory distress. He has no wheezes. He has no rales.   Abdominal: Soft. Bowel sounds are normal. He exhibits no distension. There is no tenderness.   Peg tube present   Musculoskeletal: He exhibits no edema or tenderness.   Left BKA   Neurological: He is alert and  oriented to person, place, and time.   Skin: Skin is warm and dry. Capillary refill takes less than 2 seconds. He is not diaphoretic.   Nursing note and vitals reviewed.      Vents:  Vent Mode: Spont (01/30/20 1205)  Ventilator Initiated: Yes (01/20/20 1327)  Set Rate: 12 BPM (01/30/20 0739)  Vt Set: 340 mL (01/24/20 1403)  Pressure Support: 7 cmH20 (01/30/20 1205)  PEEP/CPAP: 5 cmH20 (01/30/20 1205)  Oxygen Concentration (%): 21 (01/30/20 1205)  Peak Airway Pressure: 12 cmH2O (01/30/20 1205)  Plateau Pressure: 17 cmH20 (01/30/20 1205)  Total Ve: 5.67 mL (01/30/20 1205)  F/VT Ratio<105 (RSBI): (!) 33.33 (01/30/20 1205)  Lines/Drains/Airways     Drain                 Hemodialysis AV Fistula Right upper arm -- days         Hemodialysis AV Fistula Right upper arm -- days         NG/OG Tube 02/07/20 0815 nasogastric Right nostril 6 days         Gastrostomy/Enterostomy 02/12/20 0950 Gastrostomy tube w/ balloon midline 1 day          Peripheral Intravenous Line                 Peripheral IV - Single Lumen 02/12/20 0210 20 G Left Antecubital 2 days         Peripheral IV - Single Lumen 02/13/20 2025 20 G Left Upper Arm less than 1 day              Significant Labs:    CBC/Anemia Profile:  Recent Labs   Lab 02/13/20 2025 02/14/20  0226 02/14/20  0248   WBC 7.53 8.71  8.71 6.09   HGB 11.9* 10.6*  10.6* 9.1*   HCT 37.9* 34.0*  34.0* 30.3*    228  228 198   MCV 89 90  90 92   RDW 15.9* 15.9*  15.9* 16.2*        Chemistries:  Recent Labs   Lab 02/12/20  0651 02/13/20  0403 02/14/20  0226   * 137 136   K 4.0 3.7 3.8   CL 93* 98 97   CO2 25 24 21*   BUN 72* 32* 49*   CREATININE 7.2* 4.8* 6.3*   CALCIUM 10.3 10.4 10.4   ALBUMIN 2.3* 2.4* 2.3*   MG 2.3 2.2 2.3   PHOS 2.6* 3.7 4.6*       All pertinent labs within the past 24 hours have been reviewed.    Significant Imaging:  I have reviewed and interpreted all pertinent imaging results/findings within the past 24 hours.

## 2020-02-14 NOTE — PROVIDER TRANSFER
Hospital Medicine ICU Acceptance Note    Date of Admit: 1/20/2020  Date of Transfer / Stepdown: 2/14/2020  Bobren, C/J, L, Onc (IV chemo w/in 1 month), Gyn/Onc, or other special case?: no   ICU team stepping patient down: MICU  ICU team member giving verbal handoff:    Accepting HM team: ELINA    Brief History of Present Illness:      Patient is a 54 year old male with extensive medical history including ESRD on dialysis MWF (has not received it today), L below knee amputation 2/2 osteomyelitis, dCHF (last echo 8/2/19 [facility]), GERD, h/o multiple ICH w/o residual deficits, and multiple instances of GI bleed (last EGD 11/12/19, esophagitis) who presents to ED [facility] due to altered mental status. From NH report, nursing home staff went to wake the patient for his morning dialysis. He was confused, lethargic, had a moderate amount of brown colored emesis in his trash can. Patient last got dialysis on Friday. Patient is normally able to ambulate on his own and is alert and oriented; nursing staff reports that he appeared normal day before admission. Patient admitted to critical care with concerns of new onset seizures and s/p intubation for airway protection. Patient was started on vanc, rocephin, ampicillin, and acyclovir to cover for meningitis. Patient had spot EEG while on propofol in ED which did not show seizure activity. MRI done showed chronic changes but no acute abnormalities. Lumbar puncture was done after MRI. CSF labs were not convincing for bacterial or viral meningitis and antibiotics were weaned down. Continuous EEG was done after propofol was stopped which showed epileptiform discharges. Per epilepsy, patient was given another dose of Keppra. Nephrology consulted and started HD. Pt became febrile the night of 01/22; blood cultures repeated and restarted on Vanc and Zosyn. HSV PCR neg so Acyclovir discontinued.  Neurology was consulted and pt was continued on Keppra but dose was  "decreased. 01/27 Patient's neuro exam is somewhat improved from day prior per nurse - opening eyes spontaneously and occasionally tracking movements, blinking to threat. 01/28 passed SBT today, plan to extubate 01/29. Repeat 24 hr EEG shows "multifocal slowing consistent with multiple areas of focal cortical dysfunction and occasional epileptiform discharges in the left frontotemporal region consistent with a potential seizure focus in this area" with no electrographic seizures. 01/29 Originally planned for extubation, but was deferred in light of BRBPR ~08:30 per nurse. GI consulted, no scope indicated at this time. Otherwise mental status improving in small increments daily. 01/30 Patient successfully extubated, though very weak. Unable to clear secretions effectively at this time, weak cough. 2/1- lethargic, EEG was done with no seizure activity, Keppra dose was decreased.  2/2 - pt was more awake after decreasing Keppra dose further to 250mg BID. Pt able to answer simple questions appropriate and follow simple commands. SLP following but pt continues to fail swallow study. NGT placed. Pt started on tube feeds and tolerating well. He continues to be lethargic at this time and requiring TF through NGT. At this time he will require PEG as he has had NGT TF for several days, with no improvement. IR consult placed. s/p PEG 2/12. Started on tube feeds and tolerating well.     Hospital/ICU Course:     02/13/2020, patient noted to have several episodes of coffee-ground emesis.  Patient was transferred to the ICU for closer monitoring.  Patient underwent EGD after GI was consulted.  EGD showed normal duodenum, nonbleeding erosive gastropathy, 2 cm hiatal hernia that was biopsied, intact gastrostomy.  Patient was stabilized overnight and transferred back to Hospital Medicine    Consultants and Procedures:     Consultants:  Critical care, GI    Procedures:    EGD    Transfer Information:     Diet:  Tube feeds    Physical " Activity:  OT/PT Ordered    To Do / Pending Studies / Follow ups:  -continue to monitor CBC  -continue Protonix 40 mg via PEG tube every 12 hr  -continue tube feeds, advance as tolerated  -continue OT/PT      Patient has been accepted by Hospital Medicine Team G, who will assume care of the patient upon arrival to the floor from the ICU. Please contact ICU team with any concerns prior to arrival. Please contact Hospital Medicine at [phone] or [phone] (please do NOT leave a voicemail) when patient arrives to the floor.    Mariposa Dalton MD  2/14/2020 3:53 PM  Department of Hospital medicine

## 2020-02-14 NOTE — PT/OT/SLP DISCHARGE
Occupational Therapy Discharge Summary    Vaughn Retana  MRN: [id number]   Principal Problem: UGIB (upper gastrointestinal bleed)      Patient Discharged from acute Occupational Therapy on 2/14/20.  Please refer to prior OT note dated 2/13/20 for functional status.    Assessment:      Patient was discharged unexpectedly.  Information required to complete an accurate discharge summary is unknown.  Refer to therapy initial evaluation and last progress note for initial and most recent functional status and goal achievement.  Recommendations made may be found in medical record.    Objective:     GOALS:   Multidisciplinary Problems     Occupational Therapy Goals        Problem: Occupational Therapy Goal    Goal Priority Disciplines Outcome Interventions   Occupational Therapy Goal     OT, PT/OT Ongoing, Progressing    Description:  Goals to be met by: 2 weeks (2/15/20)     Patient will increase functional independence with ADLs by performing:    Grooming while seated with Moderate Assistance.  Sitting at edge of bed x8 minutes with Contact Guard Assistance.  Supine to sit with Moderate Assistance. -MET  Squat pivot transfers with Maximum Assistance.   Sit to stand transfers with MOD A (LLE prosthesis present)  Increased functional strength to WFL for ADLs and mobility tasks.  Pt will follow 3/3 one-step commands to participate in ADL task.                        Reasons for Discontinuation of Therapy Services  Patient is unable to continue work toward goals because of medical or psychosocial complications.      Plan:     Patient Discharged to: ICU for higher level of care; please re-consult when appropriate.    WILBUR Richardson  2/14/2020

## 2020-02-14 NOTE — ASSESSMENT & PLAN NOTE
--recurrent upper GIB; multiple episodes of coffee ground emesis reported by nursing on 2/13  --last EGD 11/2019 showed grade D esophagitis without active bleeding; has not been scoped this hospitalization   --down-trend in hgb (12.5 > 11.9 > 10.6), however remains hemodynamically stable  --continue protonix to 40mg BID  --monitor CBC Q6, transfuse prn (2 units PRBCs matched in event they are needed)  --GI to evaluate today

## 2020-02-14 NOTE — TREATMENT PLAN
Called by primary team due to transfer to ICU and concern for episode of coffee grind emesis. Rectal not yet performed.     Hx of esophagitis (Gr D on last scope 11/2019). Multiple prior scopes for UGIB due to esophagitis. Not on AC.     H&H stable (11.0 -> 12.5 -> 11.9). Blood pressures stable, mild tachycardia. BUN downtrending (on HD).    Plan  - recommend volume resuscitation  - Trend H&H, maintain Hgb > 7  - continue Protonix 40mg IV BID  - clear liquids, NPO at MN  - 2 large bore peripheral IV  - call if any acute changes in patient's clinical status

## 2020-02-14 NOTE — NURSING
Endo called to have pt transported to Endo unit for EGD. Yvonne Will Charge Nurse informed that pt doesn't have consents obtained, RN stated will obtain consent when pt arrives. Pt transported to endoscopy with RN and OR tech. Pt sent on room air via stretcher, with chart and ambu bag on portable telemetrey. NEGRO South RN and Charge RN at bedside to receive pt, Charge stated will call pt sister to consent for procedure. Care assumed to Endo.

## 2020-02-14 NOTE — NURSING
Upon entering room pt had coffee ground emesis on self and in bed. PRATIK Zelaya notified. Protonix given per order. VSS- pt a little tachycardic( low 100's) Consult to critical care placed.

## 2020-02-14 NOTE — SUBJECTIVE & OBJECTIVE
Past Medical History:   Diagnosis Date    Amputation stump pain 9/10/2013    Aspiration pneumonia 7/27/2015    Asterixis 11/8/2016    C. difficile colitis 8/7/2015    Cholelithiasis without obstruction 8/25/2015    Chronic diastolic heart failure     2-23-17   1 - Low normal to mildly depressed left ventricular systolic function (EF 50-55%).    2 - Right ventricular enlargement with mildly depressed systolic function.    3 - Left ventricular diastolic dysfunction.    4 - Right atrial enlargement.    5 - Severe tricuspid regurgitation.    6 - Pulmonary hypertension. The estimated PA systolic pressure is 86 mmHg.    7 - Increased central venous pressure.    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    Metabolic acidosis, IAG, reduced excretion of inorganic acids     Myoclonic jerking 9/20/2016    Noncompliance with medication regimen 12/4/2018    Secondary hyperparathyroidism (of renal origin)     Secondary pulmonary hypertension 3/23/2017    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 HD Patient MWF, K+ lab  prior to procedure.     ESOPHAGOGASTRODUODENOSCOPY N/A 6/12/2018    Procedure: EGD (ESOPHAGOGASTRODUODENOSCOPY);  Surgeon: Man Galicia MD;  Location: [facility] (2ND FLR);  Service: Endoscopy;  Laterality: N/A;  EGD in 8-12 weeks with Dr. Galicia on 4th floor for follow up erosive esophagitis and Mejia's surveillance.    Patient should be on Pantoprazole 40mg every 12 hours or the equivulant of another PPI    awaiting for patient to reply back regarding changing    ESOPHAGOGASTRODUODENOSCOPY N/A 3/7/2019    Procedure: EGD (ESOPHAGOGASTRODUODENOSCOPY);  Surgeon: Man Galicia MD;  Location: [facility] (2ND FLR);  Service: Endoscopy;  Laterality: N/A;    ESOPHAGOGASTRODUODENOSCOPY N/A 9/23/2019    Procedure: EGD (ESOPHAGOGASTRODUODENOSCOPY);  Surgeon: Keanu Rainey MD;  Location: [facility] (2ND FLR);  Service: Endoscopy;  Laterality: N/A;    ESOPHAGOGASTRODUODENOSCOPY N/A 10/2/2019    Procedure: EGD (ESOPHAGOGASTRODUODENOSCOPY);  Surgeon: Kevin De La Paz MD;  Location: [facility] (2ND FLR);  Service: Endoscopy;  Laterality: N/A;    ESOPHAGOGASTRODUODENOSCOPY N/A 11/12/2019    Procedure: EGD (ESOPHAGOGASTRODUODENOSCOPY);  Surgeon: Kevin De La Paz MD;  [facility]  Service: Endoscopy;  Laterality: N/A;    FOOT AMPUTATION THROUGH METATARSAL      left foot    LEG AMPUTATION THROUGH KNEE  12/18/2013    left BKA    R AVF  9/12/12    UPPER GASTROINTESTINAL ENDOSCOPY         Review of patient's allergies indicates:   Allergen Reactions    Fosrenol [lanthanum] Nausea And Vomiting     Nausea and vomiting       Family History     Problem Relation (Age of Onset)    Alcohol abuse Maternal Grandmother    Diabetes Brother, Maternal Grandfather    Early death Mother    Heart disease Father    Hyperlipidemia Father    Hypertension Father, Sister    Kidney disease Father        Tobacco Use    Smoking status: Former Smoker     Packs/day: 1.00     Years: 10.00     Pack years: 10.00    Smokeless  tobacco: Never Used   Substance and Sexual Activity    Alcohol use: No    Drug use: No    Sexual activity: Yes     Partners: Female     Birth control/protection: None      Review of Systems   Unable to perform ROS: Patient nonverbal (non-verbal at this time, though does nod head appropriately to questions)     Objective:     Vital Signs (Most Recent):  Temp: 98.8 °F (37.1 °C) (02/13/20 1941)  Pulse: 110 (02/13/20 1941)  Resp: 14 (02/13/20 1941)  BP: (!) 132/91 (02/13/20 1941)  SpO2: (!) 92 % (02/13/20 1941) Vital Signs (24h Range):  Temp:  [96.1 °F (35.6 °C)-98.8 °F (37.1 °C)] 98.8 °F (37.1 °C)  Pulse:  [] 110  Resp:  [14-18] 14  SpO2:  [92 %-100 %] 92 %  BP: (123-177)/(78-91) 132/91   Weight: 51.8 kg (114 lb 3.2 oz)  Body mass index is 18.43 kg/m².      Intake/Output Summary (Last 24 hours) at 2/13/2020 2134  Last data filed at 2/13/2020 1730  Gross per 24 hour   Intake 160 ml   Output 925 ml   Net -765 ml       Physical Exam   Constitutional: He is oriented to person, place, and time. He appears well-developed. No distress.   Chronically ill appearing   HENT:   Head: Normocephalic and atraumatic.   Right Ear: External ear normal.   Left Ear: External ear normal.   Nose: Nose normal.   Mouth/Throat: Oropharynx is clear and moist.   Eyes: Conjunctivae and EOM are normal.   Neck: Normal range of motion. Neck supple.   Cardiovascular: Normal rate, regular rhythm, normal heart sounds and intact distal pulses.   RUE AV fistula, +bruit, +thrill   Pulmonary/Chest: Effort normal and breath sounds normal. No respiratory distress. He has no wheezes. He has no rales.   Abdominal: Soft. Bowel sounds are normal. He exhibits no distension. There is no tenderness.   Peg tube present   Musculoskeletal: He exhibits no edema or tenderness.   Left BKA   Neurological: He is alert and oriented to person, place, and time.   Skin: Skin is warm and dry. Capillary refill takes less than 2 seconds. He is not diaphoretic.   Nursing  note and vitals reviewed.      Vents:  Vent Mode: Spont (01/30/20 1205)  Ventilator Initiated: Yes (01/20/20 1327)  Set Rate: 12 BPM (01/30/20 0739)  Vt Set: 340 mL (01/24/20 1403)  Pressure Support: 7 cmH20 (01/30/20 1205)  PEEP/CPAP: 5 cmH20 (01/30/20 1205)  Oxygen Concentration (%): 21 (01/30/20 1205)  Peak Airway Pressure: 12 cmH2O (01/30/20 1205)  Plateau Pressure: 17 cmH20 (01/30/20 1205)  Total Ve: 5.67 mL (01/30/20 1205)  F/VT Ratio<105 (RSBI): (!) 33.33 (01/30/20 1205)  Lines/Drains/Airways     Drain                 Hemodialysis AV Fistula Right upper arm -- days         Hemodialysis AV Fistula Right upper arm -- days         NG/OG Tube 02/07/20 0815 nasogastric Right nostril 6 days         Gastrostomy/Enterostomy 02/12/20 0950 Gastrostomy tube w/ balloon midline 1 day          Peripheral Intravenous Line                 Peripheral IV - Single Lumen 02/12/20 0210 20 G Left Antecubital 1 day         Peripheral IV - Single Lumen 02/13/20 2025 20 G Left Upper Arm less than 1 day              Significant Labs:    CBC/Anemia Profile:  Recent Labs   Lab 02/12/20  0651 02/13/20  0403 02/13/20 2025   WBC 9.32 6.75 7.53   HGB 11.0* 12.5* 11.9*   HCT 33.5* 37.9* 37.9*    169 272   MCV 86 87 89   RDW 15.9* 15.9* 15.9*        Chemistries:  Recent Labs   Lab 02/12/20  0651 02/13/20  0403   * 137   K 4.0 3.7   CL 93* 98   CO2 25 24   BUN 72* 32*   CREATININE 7.2* 4.8*   CALCIUM 10.3 10.4   ALBUMIN 2.3* 2.4*   MG 2.3 2.2   PHOS 2.6* 3.7       All pertinent labs within the past 24 hours have been reviewed.    Significant Imaging: I have reviewed and interpreted all pertinent imaging results/findings within the past 24 hours.

## 2020-02-14 NOTE — ASSESSMENT & PLAN NOTE
--fever with infiltrates on cxry 2/11 and reported increase in sputum production at that time  --started on 7 day course of augmentin (end date 2/18)

## 2020-02-14 NOTE — ANESTHESIA PROCEDURE NOTES
Intubation  Performed by: Concepcion Obrien CRNA  Authorized by: Carter Padilla MD     Intubation:     Induction:  Intravenous    Intubated:  Postinduction    Mask Ventilation:  N/a    Attempts:  1    Attempted By:  CRNA    Method of Intubation:  Direct    Blade:  Mendez 2    Laryngeal View Grade: Grade I - full view of chords      Difficult Airway Encountered?: No      Complications:  None    Airway Device:  Oral endotracheal tube    Airway Device Size:  7.5    Style/Cuff Inflation:  Cuffed (inflated to minimal occlusive pressure)    Tube secured:  23    Secured at:  The lips    Placement Verified By:  Capnometry    Complicating Factors:  None    Findings Post-Intubation:  BS equal bilateral

## 2020-02-14 NOTE — ASSESSMENT & PLAN NOTE
--currently normotensive off all agents (home coreg previous on hold due to borderline BP)  --continue to hold home coreg in setting of recurrent UGIB  --midodrine prior to HD sessions

## 2020-02-14 NOTE — TREATMENT PLAN
GI Treatment Plan    Vaughn Retana is a 54 y.o. male admitted to hospital 1/20/2020 (Hospital Day: 26) due to UGIB (upper gastrointestinal bleed).     Interval History  I was contacted about this patient since he had an episode of hematemesis last night.   Patient had a PEG tube placement by IR 2 days ago (2/12).  He denies abdominal pain currently.  No further episodes of emesis overnight.  No bowel movements overnight.  Patient remains hemodynamically stable.      Objective  Temp:  [96.1 °F (35.6 °C)-99.3 °F (37.4 °C)] 98.4 °F (36.9 °C) (02/14 0700)  Pulse:  [] 86 (02/14 0800)  BP: ()/(48-99) 142/70 (02/14 0800)  Resp:  [10-26] 12 (02/14 0800)  SpO2:  [92 %-100 %] 100 % (02/14 0800)    General: Alert, Oriented, no distress  Abdomen: Normoactive bowel sounds. Non-distended. Normal tympany. Soft. Non-tender. No peritoneal signs.  Aspirate from NG tube with small amount of coffee-ground material.    Laboratory    Recent Labs   Lab 02/14/20  0226 02/14/20  0248 02/14/20  0522   HGB 10.6*  10.6* 9.1* 11.7*       Lab Results   Component Value Date    WBC 6.72 02/14/2020    HGB 11.7 (L) 02/14/2020    HCT 36.0 (L) 02/14/2020    MCV 87 02/14/2020     02/14/2020       Lab Results   Component Value Date     02/14/2020    K 3.4 (L) 02/14/2020     02/14/2020    CO2 18 (L) 02/14/2020    BUN 41 (H) 02/14/2020    CREATININE 5.0 (H) 02/14/2020    CALCIUM 7.4 (L) 02/14/2020    ANIONGAP 13 02/14/2020    ESTGFRAFRICA 13.9 (A) 02/14/2020    EGFRNONAA 12.1 (A) 02/14/2020       Lab Results   Component Value Date    ALT 25 02/06/2020    AST 41 (H) 02/06/2020    ALKPHOS 255 (H) 02/06/2020    BILITOT 0.4 02/06/2020       Lab Results   Component Value Date    INR 1.3 (H) 02/14/2020    INR 1.2 02/14/2020    INR 1.1 02/13/2020       Plan  Patient had an episode of coffee-ground emesis last night, with coffee-grounds coming from the NG tube.  However the patient is hemodynamically stable.  His hemoglobin did  drop overnight from 12-9.1, however repeat hemoglobin is 11.7 on repeat.  Will need to have a repeat H&H to determine if either labs were inaccurate.  The patient had prior episodes of this due to severe esophagitis, suspect this is a similar episode.  -Continue IV PPI b.i.d..  -trend H&H and transfuse as indicated.  -avoid NSAIDs, anticoagulation  -keep the patient NPO for possible EGD today.  If the patient remains hemodynamically stable with a stable H&H, might change timing of the procedure.  - Plan of care was discussed with primary team.  - We will continue to follow.    Thank you for involving us in the care of Vaughn Retana. Please call with any additional questions, concerns or changes in the patient's clinical status.    Brandi Medrano MD  Gastroenterology Fellow  Spectralink: [phone]

## 2020-02-14 NOTE — CONSULTS
Patient evaluated by and admitted to Critical Care Medicine. Full H&P to follow.    Ina Joseph NP  Critical Care Medicine

## 2020-02-14 NOTE — H&P
[facility]  Critical Care Medicine  History & Physical    Patient Name: Vaughn Retana  MRN: [id number]  Admission Date: 1/20/2020  Hospital Length of Stay: 24 days  Code Status: Full Code  Attending Physician: Og Whelan MD   Primary Care Provider: Cori Randall MD   Principal Problem: UGIB (upper gastrointestinal bleed)    Subjective:     HPI:  Patient is a 54 year old male with extensive medical history including ESRD on dialysis MWF (has not received it today), L below knee amputation 2/2 osteomyelitis, dCHF (last echo 8/2/19 [facility]), GERD, h/o multiple ICH w/o residual deficits, and multiple instances of GI bleed (last EGD 11/12/19, grade D esophagitis) who presents to ED [facility] nursing home due to altered mental status. From NH report, nursing home staff went to wake the patient for his morning dialysis. He was confused, lethargic, had a moderate amount of brown colored emesis in his trash can. Patient last got dialysis on Friday. Patient is normally able to ambulate on his own and is alert and oriented; nursing staff reports that he appeared normal day before admission. The patient was admitted to critical care with concerns of new onset seizures and was intubated for airway protection. Patient was started on vanc, rocephin, ampicillin, and acyclovir to cover for meningitis. Patient had spot EEG while on propofol in ED which did not show seizure activity. MRI done showed chronic changes but no acute abnormalities. Lumbar puncture was done after MRI. CSF labs were not convincing for bacterial or viral meningitis and antibiotics were de-escalated. Continuous EEG was done after propofol was stopped which showed epileptiform discharges and the patient was continued on keppra with neurology assisting with management. Nephrology was consulted for HD management. Pt became febrile the night of 01/22; blood cultures repeated and restarted on Vanc and Zosyn. HSV PCR neg so Acyclovir  "discontinued. Repeat 24 hr EEG shows "multifocal slowing consistent with multiple areas of focal cortical dysfunction and occasional epileptiform discharges in the left frontotemporal region consistent with a potential seizure focus in this area" with no electrographic seizures. On 1/29 the patient developed BRBPR; GI consulted, no scope indicated at that time. The patient was extubated on 1/30. Repeat EEG was performed on 2/1 which showed no seizure activity. The patient continued to follow swallow studies and had a PEG tube placed on 2/12.     On 2/13 the patient had multiple episodes of coffee ground emesis. Critical Care Medicine was consulted for further management.     Hospital/ICU Course:  No notes on file     Past Medical History:   Diagnosis Date    Amputation stump pain 9/10/2013    Aspiration pneumonia 7/27/2015    Asterixis 11/8/2016    C. difficile colitis 8/7/2015    Cholelithiasis without obstruction 8/25/2015    Chronic diastolic heart failure     2-23-17   1 - Low normal to mildly depressed left ventricular systolic function (EF 50-55%).    2 - Right ventricular enlargement with mildly depressed systolic function.    3 - Left ventricular diastolic dysfunction.    4 - Right atrial enlargement.    5 - Severe tricuspid regurgitation.    6 - Pulmonary hypertension. The estimated PA systolic pressure is 86 mmHg.    7 - Increased central venous pressure.    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 8/1/2015    Metabolic acidosis, IAG, reduced excretion of inorganic acids     Myoclonic jerking 9/20/2016    Noncompliance with medication regimen 12/4/2018    Secondary hyperparathyroidism (of renal origin)     Secondary pulmonary hypertension 3/23/2017    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    TB lung, latent 08/25/2015    Negative Quantiferon Gold 3-23-17       Past Surgical History:   Procedure Laterality Date    COLONOSCOPY      COLONOSCOPY N/A 4/4/2017    Procedure: COLONOSCOPY;  Surgeon: Walker Stern MD;  [facility]  Service: Endoscopy;  Laterality: N/A;  PA Systolic Pressure 85.56. HD Patient MWF, K+ lab prior to procedure.     ESOPHAGOGASTRODUODENOSCOPY N/A 6/12/2018    Procedure: EGD (ESOPHAGOGASTRODUODENOSCOPY);  Surgeon: Man Galicia MD;  [facility]  Service: Endoscopy;  Laterality: N/A;  EGD in 8-12 weeks with Dr. Galicia on 4th floor for follow up erosive esophagitis and Mejia's surveillance.    Patient should be on Pantoprazole 40mg every 12 hours or the equivulant of another PPI    awaiting for patient to reply back regarding changing    ESOPHAGOGASTRODUODENOSCOPY N/A 3/7/2019    Procedure: EGD (ESOPHAGOGASTRODUODENOSCOPY);  Surgeon: Man Galicia MD;  [facility]  Service: Endoscopy;  Laterality: N/A;    ESOPHAGOGASTRODUODENOSCOPY N/A 9/23/2019    Procedure: EGD (ESOPHAGOGASTRODUODENOSCOPY);  Surgeon: Keanu Rainey MD;  [facility]  Service: Endoscopy;  Laterality: N/A;    ESOPHAGOGASTRODUODENOSCOPY N/A 10/2/2019    Procedure: EGD (ESOPHAGOGASTRODUODENOSCOPY);  Surgeon: Kevin De La Paz MD;  Location: [facility] ([address] [place] [address]);  Service: Endoscopy;  Laterality: N/A;    ESOPHAGOGASTRODUODENOSCOPY N/A 11/12/2019    Procedure: EGD (ESOPHAGOGASTRODUODENOSCOPY);  Surgeon: Kevin De La Paz MD;  Location: [facility] (2ND FLR);   Service: Endoscopy;  Laterality: N/A;    FOOT AMPUTATION THROUGH METATARSAL      left foot    LEG AMPUTATION THROUGH KNEE  12/18/2013    left BKA    R AVF  9/12/12    UPPER GASTROINTESTINAL ENDOSCOPY         Review of patient's allergies indicates:   Allergen Reactions    Fosrenol [lanthanum] Nausea And Vomiting     Nausea and vomiting       Family History     Problem Relation (Age of Onset)    Alcohol abuse Maternal Grandmother    Diabetes Brother, Maternal Grandfather    Early death Mother    Heart disease Father    Hyperlipidemia Father    Hypertension Father, Sister    Kidney disease Father        Tobacco Use    Smoking status: Former Smoker     Packs/day: 1.00     Years: 10.00     Pack years: 10.00    Smokeless tobacco: Never Used   Substance and Sexual Activity    Alcohol use: No    Drug use: No    Sexual activity: Yes     Partners: Female     Birth control/protection: None      Review of Systems   Unable to perform ROS: Patient nonverbal (non-verbal at this time, though does nod head appropriately to questions)     Objective:     Vital Signs (Most Recent):  Temp: 98.8 °F (37.1 °C) (02/13/20 1941)  Pulse: 110 (02/13/20 1941)  Resp: 14 (02/13/20 1941)  BP: (!) 132/91 (02/13/20 1941)  SpO2: (!) 92 % (02/13/20 1941) Vital Signs (24h Range):  Temp:  [96.1 °F (35.6 °C)-98.8 °F (37.1 °C)] 98.8 °F (37.1 °C)  Pulse:  [] 110  Resp:  [14-18] 14  SpO2:  [92 %-100 %] 92 %  BP: (123-177)/(78-91) 132/91   Weight: 51.8 kg (114 lb 3.2 oz)  Body mass index is 18.43 kg/m².      Intake/Output Summary (Last 24 hours) at 2/13/2020 2134  Last data filed at 2/13/2020 1730  Gross per 24 hour   Intake 160 ml   Output 925 ml   Net -765 ml       Physical Exam   Constitutional: He is oriented to person, place, and time. He appears well-developed. No distress.   Chronically ill appearing   HENT:   Head: Normocephalic and atraumatic.   Right Ear: External ear normal.   Left Ear: External ear normal.   Nose: Nose normal.    Mouth/Throat: Oropharynx is clear and moist.   Eyes: Conjunctivae and EOM are normal.   Neck: Normal range of motion. Neck supple.   Cardiovascular: Normal rate, regular rhythm, normal heart sounds and intact distal pulses.   RUE AV fistula, +bruit, +thrill   Pulmonary/Chest: Effort normal and breath sounds normal. No respiratory distress. He has no wheezes. He has no rales.   Abdominal: Soft. Bowel sounds are normal. He exhibits no distension. There is no tenderness.   Peg tube present   Musculoskeletal: He exhibits no edema or tenderness.   Left BKA   Neurological: He is alert and oriented to person, place, and time.   Skin: Skin is warm and dry. Capillary refill takes less than 2 seconds. He is not diaphoretic.   Nursing note and vitals reviewed.      Vents:  Vent Mode: Spont (01/30/20 1205)  Ventilator Initiated: Yes (01/20/20 1327)  Set Rate: 12 BPM (01/30/20 0739)  Vt Set: 340 mL (01/24/20 1403)  Pressure Support: 7 cmH20 (01/30/20 1205)  PEEP/CPAP: 5 cmH20 (01/30/20 1205)  Oxygen Concentration (%): 21 (01/30/20 1205)  Peak Airway Pressure: 12 cmH2O (01/30/20 1205)  Plateau Pressure: 17 cmH20 (01/30/20 1205)  Total Ve: 5.67 mL (01/30/20 1205)  F/VT Ratio<105 (RSBI): (!) 33.33 (01/30/20 1205)  Lines/Drains/Airways     Drain                 Hemodialysis AV Fistula Right upper arm -- days         Hemodialysis AV Fistula Right upper arm -- days         NG/OG Tube 02/07/20 0815 nasogastric Right nostril 6 days         Gastrostomy/Enterostomy 02/12/20 0950 Gastrostomy tube w/ balloon midline 1 day          Peripheral Intravenous Line                 Peripheral IV - Single Lumen 02/12/20 0210 20 G Left Antecubital 1 day         Peripheral IV - Single Lumen 02/13/20 2025 20 G Left Upper Arm less than 1 day              Significant Labs:    CBC/Anemia Profile:  Recent Labs   Lab 02/12/20  0651 02/13/20  0403 02/13/20 2025   WBC 9.32 6.75 7.53   HGB 11.0* 12.5* 11.9*   HCT 33.5* 37.9* 37.9*    169 272   MCV 86  87 89   RDW 15.9* 15.9* 15.9*        Chemistries:  Recent Labs   Lab 02/12/20  0651 02/13/20  0403   * 137   K 4.0 3.7   CL 93* 98   CO2 25 24   BUN 72* 32*   CREATININE 7.2* 4.8*   CALCIUM 10.3 10.4   ALBUMIN 2.3* 2.4*   MG 2.3 2.2   PHOS 2.6* 3.7       All pertinent labs within the past 24 hours have been reviewed.    Significant Imaging: I have reviewed and interpreted all pertinent imaging results/findings within the past 24 hours.    Assessment/Plan:     Neuro  Seizure  --witnessed tonic clonic sz in ED with subsequent 24h EEG showing L sided structural lesion and underlying epileptiform potential  --repeat EEG on 01/27 shows diffuse slowing, though no focal activity seen on EEG  --head CT without acute intracranial abnormalities  --continue keppra 250mg BID per neurology recommendations    Pulmonary  Aspiration pneumonia  --fever with infiltrates on cxry 2/11 and reported increase in sputum production at that time  --started on 7 day course of augmentin (end date 2/18)    Cardiac/Vascular  Renovascular hypertension  --currently normotensive off all agents (home coreg previous on hold due to borderline BP)  --continue to hold home coreg in setting of recurrent UGIB  --midodrine prior to HD sessions    Chronic diastolic heart failure  --Last echo done at [facility] 8/2/19, EF>55%, bi-atrial enlargement  --home BB on hold      Renal/  ESRD on dialysis  --nephrology following for management    Endocrine  Severe malnutrition  --s/p PEG placement 2/12  --TF on hold due to UGIB    GI  * UGIB (upper gastrointestinal bleed)  --recurrent upper GIB; multiple episodes of coffee ground emesis reported by nursing on 2/13  --last EGD 11/2019 showed grade D esophagitis without active bleeding; has not been scoped this hospitalization   --currently hemodynamically stable with only mild drop in hgb (12.5 > 11.9)  --change protonix to 40mg BID  --monitor CBC Q6, transfuse prn  --case discussed with GI who will evaluate patient  in am        Critical Care Daily Checklist:    A: Awake: RASS Goal/Actual Goal: RASS Goal: 0-->alert and calm  Actual: Maddox Agitation Sedation Scale (RASS): Alert and calm   B: Spontaneous Breathing Trial Performed? Spon. Breathing Trial Initiated?: Initiated (01/30/20 7415)   C: SAT & SBT Coordinated?  n/a                  D: Delirium: CAM-ICU Overall CAM-ICU: Positive   E: Early Mobility Performed? Yes   F: Feeding Goal: Goals: Meet % EEN, EPN  Status: Nutrition Goal Status: goal met   Current Diet Order   Procedures    Diet NPO Except for: Medication     Order Specific Question:   Except for     Answer:   Medication      AS: Analgesia/Sedation    T: Thromboembolic Prophylaxis scds   H: HOB > 300 Yes   U: Stress Ulcer Prophylaxis (if needed) ppi   G: Glucose Control ssi   B: Bowel Function Stool Occurrence: 1   I: Indwelling Catheter (Lines & Arcos) Necessity PIV x2, peg, ngt   D: De-escalation of Antimicrobials/Pharmacotherapies     Plan for the day/ETD Monitor GIB with GI eval in am    Code Status:  Family/Goals of Care: Full Code  Discussed with patient     Case discussed with [facility] Fellow Dr. Mia Payan.     Critical Care Time: 60 minutes  Critical secondary to Patient has a condition that poses threat to life and bodily function: upper GIB     Critical care was time spent personally by me on the following activities: development of treatment plan with patient or surrogate and bedside caregivers, discussions with consultants, evaluation of patient's response to treatment, examination of patient, ordering and performing treatments and interventions, ordering and review of laboratory studies, ordering and review of radiographic studies, pulse oximetry, re-evaluation of patient's condition. This critical care time did not overlap with that of any other provider or involve time for any procedures.     Ina Joseph NP  Critical Care Medicine  [facility]

## 2020-02-14 NOTE — NURSING
Pt rec'd from CRNA post EGD. Pt on wall monitor and 6l/face mask. Vital signs stable at this time.  Pt resting comfortably post procedure.

## 2020-02-14 NOTE — ASSESSMENT & PLAN NOTE
--recurrent upper GIB; multiple episodes of coffee ground emesis reported by nursing on 2/13  --last EGD 11/2019 showed grade D esophagitis without active bleeding; has not been scoped this hospitalization   --currently hemodynamically stable with only mild drop in hgb (12.5 > 11.9)  --change protonix to 40mg BID  --monitor CBC Q6, transfuse prn  --case discussed with GI who will evaluate patient in am

## 2020-02-14 NOTE — DISCHARGE INSTRUCTIONS

## 2020-02-14 NOTE — ANESTHESIA PREPROCEDURE EVALUATION
[facility]  Anesthesia Pre-Operative Evaluation         Patient Name: Vaughn Retana  YOB: 1965  MRN: [id number]    SUBJECTIVE:     Pre-operative evaluation for Procedure(s) (LRB):  EGD (ESOPHAGOGASTRODUODENOSCOPY) (N/A)  COLONOSCOPY (N/A)     02/14/2020    Vaughn Retana is a 54 y.o. 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 Admitted for coffee ground hematemesis, HDS.    Patient now presents for the above procedure(s).      LDA:        Peripheral IV - Single Lumen 11/08/19 1513 20 G Left Hand (Active)   Site Assessment Clean;Dry;Intact;No redness;No swelling 11/9/2019  3:00 PM   Line Status Saline locked 11/9/2019  3:00 PM   Dressing Status Biopatch in place 11/9/2019  3:00 PM   Dressing Intervention New dressing 11/9/2019  3:00 PM   Dressing Change Due 11/12/19 11/9/2019  3:00 PM   Site Change Due 11/12/19 11/9/2019  3:00 PM   Reason Not Rotated Not due 11/9/2019  3:00 PM   Number of days: 1            Hemodialysis AV Fistula Right upper arm (Active)   Needle Size 15ga 11/9/2019 12:30 PM   Site Assessment Clean;Dry;Intact 11/9/2019 12:30 PM   Patency Present;Thrill;Bruit 11/9/2019 12:30 PM   Status Deaccessed 11/9/2019 12:30 PM   Flows Good 11/9/2019  9:10 AM   Dressing Intervention New dressing 11/9/2019 12:30 PM   Dressing Status Clean;Dry;Intact 11/9/2019 12:30 PM   Site Condition No complications 11/9/2019 12:30 PM   Dressing Pressure dressing 11/9/2019 12:30 PM   Number of days:             Hemodialysis AV Fistula Right upper arm (Active)   Number of days:        Prev airway: None documented.    Drips: None.      Patient Active Problem List   Diagnosis    Anemia in chronic kidney disease    Secondary hyperparathyroidism of renal origin    Dyslipidemia    Peripheral vascular disease in diabetes mellitus    History of left below knee amputation  12/18/13    Aspiration pneumonia    Encephalopathy    History of Intracerebral Hemorrhage: L BG 5/2013; R BG 9/2016; R BG 11/2016; L caudate head 2/2017    Chronic diastolic heart failure    Pulmonary hypertension associated with end-stage renal disease (ESRD) on dialysis    Chronic kidney disease-mineral and bone disorder    Renovascular hypertension    Chronic upper GI bleeding    Normocytic anemia    History of GI bleed    Erosive gastritis    Noncompliance    Severe malnutrition    Personal history of latent tuberculosis infection    Hemodialysis-associated hypotension    Acute blood loss anemia    GERD with esophagitis    Gastrointestinal hemorrhage with hematemesis    Chronic blood loss anemia    Hepatitis C    Gastroparesis    Seizure    Altered mental status    Pressure injury due to medical device    Aphasia    ESRD on dialysis    UGIB (upper gastrointestinal bleed)    Acute upper GI bleed       Review of patient's allergies indicates:   Allergen Reactions    Fosrenol [lanthanum] Nausea And Vomiting     Nausea and vomiting       Current Inpatient Medications:      Current Facility-Administered Medications on File Prior to Visit   Medication Dose Route Frequency Provider Last Rate Last Dose    0.9%  NaCl infusion   Intravenous Once Ray Ortiz, MARYJO        albuterol-ipratropium 2.5 mg-0.5 mg/3 mL nebulizer solution 3 mL  3 mL Nebulization Q4H PRN Mariposa Dalton MD   3 mL at 02/07/20 2145    amoxicillin-clavulanate 500-125mg per tablet 500 mg  1 tablet Per NG tube Daily Jaskaran Colon MD   500 mg at 02/14/20 0912    dextrose 10% (D10W) Bolus  12.5 g Intravenous PRN Mariposa Dalton MD 1,000 mL/hr at 02/14/20 0403 125 mL at 02/14/20 0403    dextrose 10% (D10W) Bolus  25 g Intravenous PRN Mariposa Dalton MD        glucagon (human recombinant) injection 1 mg  1 mg Intramuscular PRN Mariposa Dalton MD        insulin aspart U-100 pen 0-5 Units  0-5 Units Subcutaneous  Q6H PRN Titus Gaines PA-C   2 Units at 02/10/20 0520    levETIRAcetam (KEPPRA) 250 mg in dextrose 5 % 100 mL IVPB  250 mg Intravenous Q12H Ina Joseph  mL/hr at 02/14/20 0912 250 mg at 02/14/20 0912    midodrine tablet 5 mg  5 mg Per NG tube Every Mon, Wed, Fri Jaskaran Colon MD   5 mg at 02/14/20 0912    ondansetron injection 4 mg  4 mg Intravenous Q6H PRN Ina Joseph NP   4 mg at 02/13/20 2142    pantoprazole injection 40 mg  40 mg Intravenous BID Martell Rodriguez Jr., PA-C   40 mg at 02/14/20 0912    sodium chloride 0.9% flush 10 mL  10 mL Intravenous PRN Ina Joseph NP         Current Outpatient Medications on File Prior to Visit   Medication Sig Dispense Refill    acetaminophen (TYLENOL) 325 MG tablet Take 2 tablets (650 mg total) by mouth every 8 (eight) hours as needed.  0    carvedilol (COREG) 3.125 MG tablet Take 1 tablet (3.125 mg total) by mouth 2 (two) times daily with meals. 60 tablet 11    metoclopramide HCl (REGLAN) 10 MG tablet Take 1 tablet (10 mg total) by mouth 3 (three) times daily before meals. 90 tablet 1    midodrine (PROAMATINE) 5 MG Tab Please take 30 min before dialysis on Monday Wednesday and Friday 60 tablet 3    ondansetron (ZOFRAN) 8 MG tablet Take 1 tablet (8 mg total) by mouth every 12 (twelve) hours as needed for Nausea. 60 tablet 1    pantoprazole (PROTONIX) 40 MG tablet Take 1 tablet (40 mg total) by mouth 2 (two) times daily. 60 tablet 11    promethazine (PHENERGAN) 25 MG tablet Take 1 tablet (25 mg total) by mouth every 6 (six) hours as needed for Nausea. 15 tablet 0    RENAPLEX-D 800 mcg-12.5 mg -2,000 unit Tab Take 1 tablet by mouth once daily.  3    sevelamer carbonate (RENVELA) 800 mg Tab Take 1 tablet (800 mg total) by mouth 3 (three) times daily with meals. 90 tablet 3    sucralfate (CARAFATE) 100 mg/mL suspension Take 10 mLs (1 g total) by mouth 4 (four) times daily before meals and nightly. 420 mL 2       Past Surgical History:    Procedure Laterality Date    COLONOSCOPY      COLONOSCOPY N/A 4/4/2017    Procedure: COLONOSCOPY;  Surgeon: Walker Stern MD;  Location: [facility] (2ND FLR);  Service: Endoscopy;  Laterality: N/A;  PA Systolic Pressure 85.56. HD Patient MWF, K+ lab prior to procedure.     ESOPHAGOGASTRODUODENOSCOPY N/A 6/12/2018    Procedure: EGD (ESOPHAGOGASTRODUODENOSCOPY);  Surgeon: Man Galicia MD;  Location: [facility] (2ND FLR);  Service: Endoscopy;  Laterality: N/A;  EGD in 8-12 weeks with Dr. Galicia on 4th floor for follow up erosive esophagitis and Mejia's surveillance.    Patient should be on Pantoprazole 40mg every 12 hours or the equivulant of another PPI    awaiting for patient to reply back regarding changing    ESOPHAGOGASTRODUODENOSCOPY N/A 3/7/2019    Procedure: EGD (ESOPHAGOGASTRODUODENOSCOPY);  Surgeon: Man Galicia MD;  [facility]  Service: Endoscopy;  Laterality: N/A;    ESOPHAGOGASTRODUODENOSCOPY N/A 9/23/2019    Procedure: EGD (ESOPHAGOGASTRODUODENOSCOPY);  Surgeon: Keanu Rainey MD;  Location: [facility] (2ND FLR);  Service: Endoscopy;  Laterality: N/A;    ESOPHAGOGASTRODUODENOSCOPY N/A 10/2/2019    Procedure: EGD (ESOPHAGOGASTRODUODENOSCOPY);  Surgeon: Kevin De La Paz MD;  [facility]  Service: Endoscopy;  Laterality: N/A;    ESOPHAGOGASTRODUODENOSCOPY N/A 11/12/2019    Procedure: EGD (ESOPHAGOGASTRODUODENOSCOPY);  Surgeon: Kevin De La Paz MD;  [facility]  Service: Endoscopy;  Laterality: N/A;    FOOT AMPUTATION THROUGH METATARSAL      left foot    LEG AMPUTATION THROUGH KNEE  12/18/2013    left BKA    R AVF  9/12/12    UPPER GASTROINTESTINAL ENDOSCOPY         Social History     Socioeconomic History    Marital status:      Spouse name: Not on file    Number of children: Not on file    Years of education: Not on file    Highest education level: Not on file   Occupational History    Not on file   Social Needs     Financial resource strain: Not on file    Food insecurity:     Worry: Not on file     Inability: Not on file    Transportation needs:     Medical: Not on file     Non-medical: Not on file   Tobacco Use    Smoking status: Former Smoker     Packs/day: 1.00     Years: 10.00     Pack years: 10.00    Smokeless tobacco: Never Used   Substance and Sexual Activity    Alcohol use: No    Drug use: No    Sexual activity: Yes     Partners: Female     Birth control/protection: None   Lifestyle    Physical activity:     Days per week: Not on file     Minutes per session: Not on file    Stress: Not on file   Relationships    Social connections:     Talks on phone: Not on file     Gets together: Not on file     Attends Restoration service: Not on file     Active member of club or organization: Not on file     Attends meetings of clubs or organizations: Not on file     Relationship status: Not on file   Other Topics Concern    Not on file   Social History Narrative    Not on file       OBJECTIVE:     Vital Signs Range (Last 24H):  Temp:  [35.6 °C (96.1 °F)-37.4 °C (99.3 °F)]   Pulse:  []   Resp:  [10-26]   BP: ()/()   SpO2:  [92 %-100 %]       Significant Labs:  Lab Results   Component Value Date    WBC 7.52 02/14/2020    HGB 11.2 (L) 02/14/2020    HCT 35.7 (L) 02/14/2020     02/14/2020    CHOL 111 (L) 06/22/2019    TRIG 59 06/22/2019    HDL 40 06/22/2019    ALT 25 02/06/2020    AST 41 (H) 02/06/2020     02/14/2020    K 3.4 (L) 02/14/2020     02/14/2020    CREATININE 5.0 (H) 02/14/2020    BUN 41 (H) 02/14/2020    CO2 18 (L) 02/14/2020    TSH 1.221 01/23/2020    PSA 1.7 10/03/2018    INR 1.3 (H) 02/14/2020    HGBA1C 4.7 02/06/2020       EKG:   Normal sinus rhythm  Incomplete right bundle branch block  Nonspecific T wave abnormality  Abnormal ECG    2D ECHO: 7/2017  CONCLUSIONS     1 - Normal left ventricular systolic function (EF 60-65%).     2 - Concentric hypertrophy.     3 - No wall  motion abnormalities.     4 - Normal left ventricular diastolic function.     5 - Normal right ventricular systolic function .       ASSESSMENT/PLAN:       Pre-op Assessment    I have reviewed the Patient Summary Reports.      I have reviewed the Medications.     Review of Systems  Anesthesia Hx:  No problems with previous Anesthesia  History of prior surgery of interest to airway management or planning: Denies Family Hx of Anesthesia complications.   Denies Personal Hx of Anesthesia complications.   Social:  No Alcohol Use, Former Smoker    Hematology/Oncology:     Oncology Normal    -- Anemia:   EENT/Dental:EENT/Dental Normal   Cardiovascular:   Exercise tolerance: poor Hypertension, well controlled Denies MI.   Denies CABG/stent.   Denies Angina. CHF Diastolic dysfunction Congestive Heart Failure (CHF) , Chronic Congestive Heart Failure , LV Diastolic HF   Pulmonary:   Denies COPD.  Denies Asthma.    Renal/:   Chronic Renal Disease, ESRD    Hepatic/GI:   PUD, GERD Hepatitis, C hematemesis   Musculoskeletal:  Musculoskeletal Normal    Neurological:   Denies TIA. CVA, no residual symptoms Denies Seizures.    Endocrine:   Denies Diabetes.    Dermatological:  Skin Normal    Psych:  Psychiatric Normal           Physical Exam  General:  Well nourished    Airway/Jaw/Neck:  Airway Findings: Mouth Opening: Normal Tongue: Normal  General Airway Assessment: Adult  Mallampati: III  TM Distance: Normal, at least 6 cm  Jaw/Neck Findings:     Eyes/Ears/Nose:  EYES/EARS/NOSE FINDINGS: Normal   Dental:  Dental Findings: Periodontal disease, Severe    Chest/Lungs:  Chest/Lungs Findings: Clear to auscultation     Heart/Vascular:  Heart Findings: Rate: Normal  Rhythm: Regular Rhythm  Heart murmur: negative    Abdomen:  Abdomen Findings:  Soft, Nontender     Musculoskeletal:  L BKA    Mental Status:  Mental Status Findings:  Alert and Oriented, Cooperative         Anesthesia Plan  Type of Anesthesia, risks & benefits  discussed:  Anesthesia Type:  general, MAC  Patient's Preference:   Intra-op Monitoring Plan: standard ASA monitors  Intra-op Monitoring Plan Comments:   Post Op Pain Control Plan: multimodal analgesia  Post Op Pain Control Plan Comments:   Induction:   IV  Beta Blocker:         Informed Consent: Patient understands risks and agrees with Anesthesia plan.  Questions answered. Anesthesia consent signed with patient.  ASA Score: 3     Day of Surgery Review of History & Physical:    H&P update referred to the provider.         Ready For Surgery From Anesthesia Perspective.

## 2020-02-14 NOTE — PROGRESS NOTES
[facility]  Nephrology  Progress Note    Patient Name: Vaughn Retana  MRN: [id number]  Admission Date: 1/20/2020  Hospital Length of Stay: 25 days  Attending Provider: Og Whelan MD   Primary Care Physician: Cori Randall MD  Principal Problem:UGIB (upper gastrointestinal bleed)    Subjective:     Interval History:   Stepped up to ICU 2/2 hematemesis.  HDS on exam this morning, no distress noted. Electrolytes stable.  Plans for EGD today per gastro.      Review of patient's allergies indicates:   Allergen Reactions    Fosrenol [lanthanum] Nausea And Vomiting     Nausea and vomiting     Current Facility-Administered Medications   Medication Frequency    albuterol-ipratropium 2.5 mg-0.5 mg/3 mL nebulizer solution 3 mL Q4H PRN    amoxicillin-clavulanate 500-125mg per tablet 500 mg Daily    dextrose 10% (D10W) Bolus PRN    dextrose 10% (D10W) Bolus PRN    glucagon (human recombinant) injection 1 mg PRN    insulin aspart U-100 pen 0-5 Units Q6H PRN    levETIRAcetam (KEPPRA) 250 mg in dextrose 5 % 100 mL IVPB Q12H    midodrine tablet 5 mg Every Mon, Wed, Fri    ondansetron injection 4 mg Q6H PRN    pantoprazole injection 40 mg BID    sodium chloride 0.9% flush 10 mL PRN       Objective:     Vital Signs (Most Recent):  Temp: 98 °F (36.7 °C) (02/14/20 1200)  Pulse: 73 (02/14/20 1300)  Resp: 11 (02/14/20 1300)  BP: (!) 117/40 (02/14/20 1300)  SpO2: 100 % (02/14/20 1300)  O2 Device (Oxygen Therapy): nasal cannula (02/14/20 1200) Vital Signs (24h Range):  Temp:  [98 °F (36.7 °C)-99.3 °F (37.4 °C)] 98 °F (36.7 °C)  Pulse:  [] 73  Resp:  [10-26] 11  SpO2:  [92 %-100 %] 100 %  BP: ()/() 117/40     Weight: 51.8 kg (114 lb 3.2 oz) (02/13/20 2127)  Body mass index is 18.43 kg/m².  Body surface area is 1.55 meters squared.    I/O last 3 completed shifts:  In: 510 [NG/GT:160; IV Piggyback:350]  Out: 925 [Drains:925]    Physical Exam   Constitutional: He is oriented to  person, place, and time. He appears well-developed. No distress.   Chronically ill appearing   HENT:   Head: Normocephalic and atraumatic.   Right Ear: External ear normal.   Left Ear: External ear normal.   Nose: Nose normal.   Mouth/Throat: Oropharynx is clear and moist.   Eyes: Conjunctivae and EOM are normal.   Neck: Normal range of motion. Neck supple.   Cardiovascular: Normal rate, regular rhythm, normal heart sounds and intact distal pulses.   RUE AV fistula, +bruit, +thrill   Pulmonary/Chest: Effort normal and breath sounds normal. No respiratory distress. He has no wheezes. He has no rales.   Abdominal: Soft. Bowel sounds are normal. He exhibits no distension. There is no tenderness.   Peg tube present   Musculoskeletal: He exhibits no edema or tenderness.   Left BKA   Neurological: He is alert and oriented to person, place, and time.   Skin: Skin is warm and dry. Capillary refill takes less than 2 seconds. He is not diaphoretic.   Nursing note and vitals reviewed.      Significant Labs:  CBC:   Recent Labs   Lab 02/14/20  0745   WBC 7.52   RBC 4.01*   HGB 11.2*   HCT 35.7*      MCV 89   MCH 27.9   MCHC 31.4*     CMP:   Recent Labs   Lab 02/14/20  0248   *   CALCIUM 7.4*   ALBUMIN 1.7*      K 3.4*   CO2 18*      BUN 41*   CREATININE 5.0*            Assessment/Plan:     ESRD on dialysis  The patient is a 55 yo AAM admitted to MICU with seizure activity after presenting to the ED on 1/20 after being found with alter mental status at his NH facility Monday morning. The patient is currently on continuous EEG monitoring. Last dialyze on MWF.      Nephrology History  iHD Schedule: MWF  Unit/MD: [facility]/ Dr. Lemus  Duration: 3.5 hrs  EDW: ?  Access: [facility] AVF   Resodial Renal Function: Yes (per records)     Plan:   - no emergent need for RRT today  - Daily renal function panels   - Continue to monitor intake and output, daily weights   - Avoid nephrotoxic medication and renal dose  medications to GFR  - Will discuss with Dr. Ahn.  -plan for HD tomorrow      Wade Weber NP  Nephrology  [facility]

## 2020-02-14 NOTE — PROGRESS NOTES
"[facility]  Adult Nutrition  Progress Note    SUMMARY       Recommendations  1. If able to restart TF, recommend Novasource Renal @ 40mL/hr.   - Provides 1980kcals, 87g protein and 688mL free water.     2. If unable to start enteral nutrition and parenteral nutrition warranted, recommend Custom TPN 300g Dextrose, 100g AA with 20% lipids in 250mL.   - Provides 1920kcals, 100g protein.     RD to monitor.    Goals: Meet % EEN, EPN  Nutrition Goal Status: goal met  Communication of RD Recs: reviewed with physician    Reason for Assessment    Reason For Assessment: RD follow-up  Diagnosis: other (see comments)(Seizures)  Relevant Medical History: HTN, ESRD on HD, L. BKA  Interdisciplinary Rounds: did not attend  General Information Comments: Pt transferred to ICU s/p multiple episodes of coffee ground emesis. POD2 PEG placement. TF canceled. GI plans for keeping NPO for possible EGD. Pt with severe malnutrition and NFPE completed - continues with severe malnutrition.  Nutrition Discharge Planning: unable to assess at this time.    Nutrition Risk Screen    Nutrition Risk Screen: tube feeding or parenteral nutrition    Nutrition/Diet History    Spiritual, Cultural Beliefs, Congregation Practices, Values that Affect Care: no  Factors Affecting Nutritional Intake: NPO    Anthropometrics    Temp: 98.4 °F (36.9 °C)  Height Method: Stated  Height: 5' 6" (167.6 cm)  Height (inches): 66 in  Weight Method: Bed Scale  Weight: 51.8 kg (114 lb 3.2 oz)  Weight (lb): 114.2 lb  Ideal Body Weight (IBW), Male: 142 lb  % Ideal Body Weight, Male (lb): 88.49 %  BMI (Calculated): 18.4  BMI Grade: 18.5-24.9 - normal  Usual Body Weight (UBW), k kg  % Usual Body Weight: 86.54  % Weight Change From Usual Weight: -13.64 %  Amputation %: 5.9  Total Amputation %: 5.9  Amputation Ideal Body Weight (IBW), Male (lb): 136.1 lb  Amputee BMI (kg/m2): 21.61 kg/m2       Lab/Procedures/Meds    Pertinent Labs Reviewed: " reviewed  Pertinent Labs Comments: BUN 41, Cr 5.0, Ph 3.5, K 3.4  Pertinent Medications Reviewed: reviewed  Pertinent Medications Comments: keppra, insulin      Estimated/Assessed Needs    Weight Used For Calorie Calculations: 60.2 kg (132 lb 11.5 oz)(pt extubated, nutrition reassessment)  Energy Calorie Requirements (kcal): 2107 kcal  Energy Need Method: Kcal/kg  Protein Requirements: 90-103g/day  Weight Used For Protein Calculations: 60.2 kg (132 lb 11.5 oz)     Estimated Fluid Requirement Method: other (see comments)(Per MD or 1 mL/kcal)  RDA Method (mL): 2107         Nutrition Prescription Ordered    Current Diet Order: NPO  Nutrition Order Comments: TF canceled  Current Nutrition Support Formula Ordered: Other (Comment)  Current Nutrition Support Rate Ordered: 0 (ml)  Current Nutrition Support Frequency Ordered: .    Evaluation of Received Nutrient/Fluid Intake      Tolerance: not tolerating  % Intake of Estimated Energy Needs: 0 - 25 %  % Meal Intake: NPO    Nutrition Risk    Level of Risk/Frequency of Follow-up: high(f/u 2x/week)     Assessment and Plan    Severe malnutrition    Nutrition Problem:  Severe Protein-Calorie Malnutrition  Malnutrition in the context of Chronic Illness/Injury    Related to (etiology):  Inadequate energy intake    Signs and Symptoms (as evidenced by):  Body Fat Depletion: severe depletion of orbitals and triceps   Muscle Mass Depletion: severe depletion of temples, clavicle region and interosseous muscle   Weight Loss: 14% x 6 months     Interventions(treatment strategy):  Collaboration of nutrition care w/ other providers    Nutrition Diagnosis Status:  Continues           Monitor and Evaluation    Food and Nutrient Intake: enteral nutrition intake, parenteral nutrition intake  Food and Nutrient Adminstration: enteral and parenteral nutrition administration  Physical Activity and Function: nutrition-related ADLs and IADLs  Anthropometric Measurements: weight, weight  change  Biochemical Data, Medical Tests and Procedures: inflammatory profile, lipid profile, glucose/endocrine profile, gastrointestinal profile, electrolyte and renal panel  Nutrition-Focused Physical Findings: overall appearance     Malnutrition Assessment             Weight Loss (Malnutrition): greater than 10% in 6 months  Energy Intake (Malnutrition): other (see comments)(GONZALO)  Subcutaneous Fat (Malnutrition): severe depletion  Muscle Mass (Malnutrition): severe depletion   Orbital Region (Subcutaneous Fat Loss): severe depletion  Upper Arm Region (Subcutaneous Fat Loss): severe depletion   [place] Region (Muscle Loss): severe depletion  Clavicle Bone Region (Muscle Loss): severe depletion  Scapular Bone Region (Muscle Loss): severe depletion  Dorsal Hand (Muscle Loss): severe depletion  Anterior Thigh Region (Muscle Loss): (GONZALO)  Posterior Calf Region (Muscle Loss): (GONZALO)                 Nutrition Follow-Up    RD Follow-up?: Yes

## 2020-02-14 NOTE — PT/OT/SLP DISCHARGE
Speech Language Pathology  Discharge Summary    New SLP orders warranted s/p pt transfer for higher level of care. Please re-consult when pt medically stable and appropriate to resume SLP services. Thank you.     NALDO Holden, CCC-SLP  [phone]  2/14/2020

## 2020-02-14 NOTE — H&P
Short Stay Endoscopy History and Physical    PCP - Cori Randall MD  Referring Physician - Aaareferral Self  No address on file    Procedure - EGD   ASA - per anesthesia  Mallampati - per anesthesia  History of Anesthesia problems - no  Family history Anesthesia problems -  no   Plan of anesthesia - General    HPI  54 y.o. male here for EGD for evaluation of hematemesis(coffee ground emesis), had PEG tube placed by IR on 2/12. NG aspirate has coffee ground output. He has history of Grade D esophagitis.     ROS:  Constitutional: No fevers, chills, No weight loss  CV: No chest pain  Pulm: No cough, No shortness of breath  GI: see HPI    Medical History:  has a past medical history of Amputation stump pain (9/10/2013), Aspiration pneumonia (7/27/2015), Asterixis (11/8/2016), C. difficile colitis (8/7/2015), Cholelithiasis without obstruction (8/25/2015), Chronic diastolic heart failure, Chronic low back pain (12/1/2015), Closed head injury (9/8/2016), DVT (deep venous thrombosis) (7/28/2017), ESRD on hemodialysis (2/7/2013), GERD (gastroesophageal reflux disease), HCV antibody positive, Hemiparesis affecting left side as late effect of stroke (11/08/2016), History of Intracerebral Hemorrhage: L BG 5/2013; R BG 9/2016; R BG 11/2016; L caudate head 2/2017 (11/2/2016), Hypertension, left basal ganglia ICH 5/2013 (11/2/2016), Left Caudate Head ICH 2/22/2017 (2/24/2017), 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    Surgical History:  has a past surgical history that includes R AVF (9/12/12); Leg amputation through knee (12/18/2013); Foot amputation through metatarsal;  Colonoscopy; Colonoscopy (N/A, 4/4/2017); Esophagogastroduodenoscopy (N/A, 6/12/2018); Upper gastrointestinal endoscopy; Esophagogastroduodenoscopy (N/A, 3/7/2019); Esophagogastroduodenoscopy (N/A, 9/23/2019); Esophagogastroduodenoscopy (N/A, 10/2/2019); and Esophagogastroduodenoscopy (N/A, 11/12/2019).    Family History: family history includes Alcohol abuse in his maternal grandmother; Diabetes in his brother and maternal grandfather; Early death in his mother; Heart disease in his father; Hyperlipidemia in his father; Hypertension in his father and sister; Kidney disease in his father., see HPI    Social History:  reports that he has quit smoking. He has a 10.00 pack-year smoking history. He has never used smokeless tobacco. He reports that he does not drink alcohol or use drugs.    Review of patient's allergies indicates:   Allergen Reactions    Fosrenol [lanthanum] Nausea And Vomiting     Nausea and vomiting       Medications:   Medications Prior to Admission   Medication Sig Dispense Refill Last Dose    acetaminophen (TYLENOL) 325 MG tablet Take 2 tablets (650 mg total) by mouth every 8 (eight) hours as needed.  0 11/7/2019    carvedilol (COREG) 3.125 MG tablet Take 1 tablet (3.125 mg total) by mouth 2 (two) times daily with meals. 60 tablet 11 Taking    metoclopramide HCl (REGLAN) 10 MG tablet Take 1 tablet (10 mg total) by mouth 3 (three) times daily before meals. 90 tablet 1 11/7/2019    midodrine (PROAMATINE) 5 MG Tab Please take 30 min before dialysis on Monday Wednesday and Friday 60 tablet 3 Taking    ondansetron (ZOFRAN) 8 MG tablet Take 1 tablet (8 mg total) by mouth every 12 (twelve) hours as needed for Nausea. 60 tablet 1 Taking    pantoprazole (PROTONIX) 40 MG tablet Take 1 tablet (40 mg total) by mouth 2 (two) times daily. 60 tablet 11 Taking    promethazine (PHENERGAN) 25 MG tablet Take 1 tablet (25 mg total) by mouth every 6 (six) hours as needed for Nausea. 15 tablet 0     RENAPLEX-D  800 mcg-12.5 mg -2,000 unit Tab Take 1 tablet by mouth once daily.  3 11/7/2019    sevelamer carbonate (RENVELA) 800 mg Tab Take 1 tablet (800 mg total) by mouth 3 (three) times daily with meals. 90 tablet 3 Taking    sucralfate (CARAFATE) 100 mg/mL suspension Take 10 mLs (1 g total) by mouth 4 (four) times daily before meals and nightly. 420 mL 2 Taking       Physical Exam:    Vital Signs:   Vitals:    02/14/20 1042   BP: (!) 154/67   Pulse: 87   Resp: 16   Temp: 98 °F (36.7 °C)       General Appearance: Well appearing in no acute distress  Abdomen: Soft, non tender, non distended with normal bowel sounds, no masses    Labs:  Lab Results   Component Value Date    WBC 7.52 02/14/2020    HGB 11.2 (L) 02/14/2020    HCT 35.7 (L) 02/14/2020     02/14/2020    CHOL 111 (L) 06/22/2019    TRIG 59 06/22/2019    HDL 40 06/22/2019    ALT 25 02/06/2020    AST 41 (H) 02/06/2020     02/14/2020    K 3.4 (L) 02/14/2020     02/14/2020    CREATININE 5.0 (H) 02/14/2020    BUN 41 (H) 02/14/2020    CO2 18 (L) 02/14/2020    TSH 1.221 01/23/2020    PSA 1.7 10/03/2018    INR 1.3 (H) 02/14/2020    HGBA1C 4.7 02/06/2020       I have explained the risks and benefits of this endoscopic procedure to the patient including but not limited to bleeding, inflammation, infection, perforation, and death.      Sotero Ojeda MD

## 2020-02-14 NOTE — [employer] PATIENT INSTRUCTIONS
Discharge Summary/Instructions after an Endoscopic Procedure  Patient Name: Vaughn Retana  Patient MRN: [id number]  Patient YOB: 1965 Friday, February 14, 2020  Kevin De La Paz MD  RESTRICTIONS:  During your procedure today, you received medications for sedation.  These   medications may affect your judgment, balance and coordination.  Therefore,   for 24 hours, you have the following restrictions:   - DO NOT drive a car, operate machinery, make legal/financial decisions,   sign important papers or drink alcohol.    ACTIVITY:  Today: no heavy lifting, straining or running due to procedural   sedation/anesthesia.  The following day: return to full activity including work.  DIET:  Eat and drink normally unless instructed otherwise.     TREATMENT FOR COMMON SIDE EFFECTS:  - Mild abdominal pain, nausea, belching, bloating or excessive gas:  rest,   eat lightly and use a heating pad.  - Sore Throat: treat with throat lozenges and/or gargle with warm salt   water.  - Because air was used during the procedure, expelling large amounts of air   from your rectum or belching is normal.  - If a bowel prep was taken, you may not have a bowel movement for 1-3 days.    This is normal.  SYMPTOMS TO WATCH FOR AND REPORT TO YOUR PHYSICIAN:  1. Abdominal pain or bloating, other than gas cramps.  2. Chest pain.  3. Back pain.  4. Signs of infection such as: chills or fever occurring within 24 hours   after the procedure.  5. Rectal bleeding, which would show as bright red, maroon, or black stools.   (A tablespoon of blood from the rectum is not serious, especially if   hemorrhoids are present.)  6. Vomiting.  7. Weakness or dizziness.  GO DIRECTLY TO THE NEAREST EMERGENCY ROOM IF YOU HAVE ANY OF THE FOLLOWING:      Difficulty breathing              Chills and/or fever over 101 F   Persistent vomiting and/or vomiting blood   Severe abdominal pain   Severe chest pain   Black, tarry stools   Bleeding- more than one  tablespoon   Any other symptom or condition that you feel may need urgent attention  Your doctor recommends these additional instructions:  If any biopsies were taken, your doctors clinic will contact you in 1 to 2   weeks with any results.  - Return patient to [facility] for [facility] care.   - Await pathology results.   - Telephone endoscopist for pathology results in 2 weeks.   - Repeat upper endoscopy in 3 years for surveillance based on pathology   results.   - Use Protonix (pantoprazole) 40 mg via pegtube every 12 hours.   - The findings and recommendations were discussed with the patient.   - The findings and recommendations were discussed with the patient's primary   physician.   - Consider avoiding all non-steroidal anti-inflammatory drugs ( ibuprofen,   naproxen, etc.), unless needed for cardiovascular protection.  O.K to take   aspirin if needed for cardiovascular protection.  For questions, problems or results please call your physician - Kevin De La Paz MD at Work:  [phone].  [facility], EMERGENCY ROOM PHONE NUMBER: [phone]  IF A COMPLICATION OR EMERGENCY SITUATION ARISES AND YOU ARE UNABLE TO REACH   YOUR PHYSICIAN - GO DIRECTLY TO THE EMERGENCY ROOM.  Kevin De La Paz MD  2/14/2020 11:49:42 AM  This report has been verified and signed electronically.  [employer]

## 2020-02-14 NOTE — ASSESSMENT & PLAN NOTE
--witnessed tonic clonic sz in ED with subsequent 24h EEG showing L sided structural lesion and underlying epileptiform potential  --repeat EEG on 01/27 shows diffuse slowing, though no focal activity seen on EEG  --head CT without acute intracranial abnormalities  --continue keppra 250mg BID per neurology recommendations

## 2020-02-14 NOTE — SUBJECTIVE & OBJECTIVE
Interval History:   Stepped up to ICU 2/2 hematemesis.  HDS on exam this morning, no distress noted. Electrolytes stable.  Plans for EGD today per gastro.      Review of patient's allergies indicates:   Allergen Reactions    Fosrenol [lanthanum] Nausea And Vomiting     Nausea and vomiting     Current Facility-Administered Medications   Medication Frequency    albuterol-ipratropium 2.5 mg-0.5 mg/3 mL nebulizer solution 3 mL Q4H PRN    amoxicillin-clavulanate 500-125mg per tablet 500 mg Daily    dextrose 10% (D10W) Bolus PRN    dextrose 10% (D10W) Bolus PRN    glucagon (human recombinant) injection 1 mg PRN    insulin aspart U-100 pen 0-5 Units Q6H PRN    levETIRAcetam (KEPPRA) 250 mg in dextrose 5 % 100 mL IVPB Q12H    midodrine tablet 5 mg Every Mon, Wed, Fri    ondansetron injection 4 mg Q6H PRN    pantoprazole injection 40 mg BID    sodium chloride 0.9% flush 10 mL PRN       Objective:     Vital Signs (Most Recent):  Temp: 98 °F (36.7 °C) (02/14/20 1200)  Pulse: 73 (02/14/20 1300)  Resp: 11 (02/14/20 1300)  BP: (!) 117/40 (02/14/20 1300)  SpO2: 100 % (02/14/20 1300)  O2 Device (Oxygen Therapy): nasal cannula (02/14/20 1200) Vital Signs (24h Range):  Temp:  [98 °F (36.7 °C)-99.3 °F (37.4 °C)] 98 °F (36.7 °C)  Pulse:  [] 73  Resp:  [10-26] 11  SpO2:  [92 %-100 %] 100 %  BP: ()/() 117/40     Weight: 51.8 kg (114 lb 3.2 oz) (02/13/20 2127)  Body mass index is 18.43 kg/m².  Body surface area is 1.55 meters squared.    I/O last 3 completed shifts:  In: 510 [NG/GT:160; IV Piggyback:350]  Out: 925 [Drains:925]    Physical Exam   Constitutional: He is oriented to person, place, and time. He appears well-developed. No distress.   Chronically ill appearing   HENT:   Head: Normocephalic and atraumatic.   Right Ear: External ear normal.   Left Ear: External ear normal.   Nose: Nose normal.   Mouth/Throat: Oropharynx is clear and moist.   Eyes: Conjunctivae and EOM are normal.   Neck: Normal range  of motion. Neck supple.   Cardiovascular: Normal rate, regular rhythm, normal heart sounds and intact distal pulses.   RUE AV fistula, +bruit, +thrill   Pulmonary/Chest: Effort normal and breath sounds normal. No respiratory distress. He has no wheezes. He has no rales.   Abdominal: Soft. Bowel sounds are normal. He exhibits no distension. There is no tenderness.   Peg tube present   Musculoskeletal: He exhibits no edema or tenderness.   Left BKA   Neurological: He is alert and oriented to person, place, and time.   Skin: Skin is warm and dry. Capillary refill takes less than 2 seconds. He is not diaphoretic.   Nursing note and vitals reviewed.      Significant Labs:  CBC:   Recent Labs   Lab 02/14/20  0745   WBC 7.52   RBC 4.01*   HGB 11.2*   HCT 35.7*      MCV 89   MCH 27.9   MCHC 31.4*     CMP:   Recent Labs   Lab 02/14/20  0248   *   CALCIUM 7.4*   ALBUMIN 1.7*      K 3.4*   CO2 18*      BUN 41*   CREATININE 5.0*

## 2020-02-14 NOTE — ASSESSMENT & PLAN NOTE
--Last echo done at [facility] [place] 8/2/19, EF>55%, bi-atrial enlargement  --home BB on hold

## 2020-02-14 NOTE — PLAN OF CARE
POC reviewed with pt at 0500. Pt verbalized understanding but showed evidence of needing reinforcement. No drips. D10 bolus given. MAPs >65 sustained. No vomiting overnight. Questions and concerns addressed. No acute events overnight. Pt progressing toward goals. Will continue to monitor. See flowsheets for full assessment and VS info

## 2020-02-14 NOTE — PROCEDURES
Attempted PIV placement x2 in LUE. Initial attempt unable to thread catheter over guidewire. 2nd attempt able to thread catheter over guidewire, however while attempting to secure IV, catheter dislodged out of vein and unable to be salvaged.     Patient with x2 working 20g IV's and only mild drop in hgb with overall hemodynamic instability (mild tachycardia present, HR 90s-100s). Will defer further access placement at this time.    Ina Joseph NP  Critical Care Medicine

## 2020-02-14 NOTE — PT/OT/SLP PROGRESS
Physical Therapy  Pt Not Seen    Patient Name:  Vaughn Retana   MRN:  [id number]    Patient not seen today secondary to  Other (Comment)(Pt transferred to ICU.  Will require new PT orders to resume services).     Ann Marie Klein, PTA  2/14/2020

## 2020-02-14 NOTE — ASSESSMENT & PLAN NOTE
The patient is a 55 yo AAM admitted to MICU with seizure activity after presenting to the ED on 1/20 after being found with alter mental status at his NH facility Monday morning. The patient is currently on continuous EEG monitoring. Last dialyze on MWF.      Nephrology History  iHD Schedule: MWF  Unit/MD: [facility]/ Dr. Lemus  Duration: 3.5 hrs  EDW: ?  Access: RICHARD AVF   Resodial Renal Function: Yes (per records)     Plan:   - no emergent need for RRT today  - Daily renal function panels   - Continue to monitor intake and output, daily weights   - Avoid nephrotoxic medication and renal dose medications to GFR  - Will discuss with Dr. Anh.  -plan for HD tomorrow

## 2020-02-14 NOTE — PROGRESS NOTES
[facility]  Critical Care Medicine  Progress Note    Patient Name: Vaughn Retana  MRN: [id number]  Admission Date: 1/20/2020  Hospital Length of Stay: 25 days  Code Status: Full Code  Attending Provider: Og Whelan MD  Primary Care Provider: Cori Randall MD   Principal Problem: UGIB (upper gastrointestinal bleed)    Subjective:     HPI:  Patient is a 54 year old male with extensive medical history including ESRD on dialysis MWF (has not received it today), L below knee amputation 2/2 osteomyelitis, dCHF (last echo 8/2/19 [facility] [place]), GERD, h/o multiple ICH w/o residual deficits, and multiple instances of GI bleed (last EGD 11/12/19, grade D esophagitis) who presents to ED [facility] due to altered mental status. From NH report, nursing home staff went to wake the patient for his morning dialysis. He was confused, lethargic, had a moderate amount of brown colored emesis in his trash can. Patient last got dialysis on Friday. Patient is normally able to ambulate on his own and is alert and oriented; nursing staff reports that he appeared normal day before admission. The patient was admitted to critical care with concerns of new onset seizures and was intubated for airway protection. Patient was started on vanc, rocephin, ampicillin, and acyclovir to cover for meningitis. Patient had spot EEG while on propofol in ED which did not show seizure activity. MRI done showed chronic changes but no acute abnormalities. Lumbar puncture was done after MRI. CSF labs were not convincing for bacterial or viral meningitis and antibiotics were de-escalated. Continuous EEG was done after propofol was stopped which showed epileptiform discharges and the patient was continued on keppra with neurology assisting with management. Nephrology was consulted for HD management. Pt became febrile the night of 01/22; blood cultures repeated and restarted on Vanc and Zosyn. HSV PCR neg so Acyclovir  "discontinued. Repeat 24 hr EEG shows "multifocal slowing consistent with multiple areas of focal cortical dysfunction and occasional epileptiform discharges in the left frontotemporal region consistent with a potential seizure focus in this area" with no electrographic seizures. On 1/29 the patient developed BRBPR; GI consulted, no scope indicated at that time. The patient was extubated on 1/30. Repeat EEG was performed on 2/1 which showed no seizure activity. The patient continued to follow swallow studies and had a PEG tube placed on 2/12.     On 2/13 the patient had multiple episodes of coffee ground emesis. Critical Care Medicine was consulted for further management.     Hospital/ICU Course:  Patient transferred back to ICU evening of 2/13 following recurrence of hematemesis and UGIB. No further episodes of coffee ground emesis since transfer to ICU. Down-trend in hgb noted (12.5 > 11.9 > 10.6), however patient remained hemodynamically stable.     Interval History/Significant Events: Patient transferred back to ICU evening of 2/13 following recurrence of hematemesis and UGIB. No further episodes of coffee ground emesis since transfer to ICU. Down-trend in hgb noted (12.5 > 11.9 > 10.6), however patient remained hemodynamically stable.     Review of Systems   Unable to perform ROS: Patient nonverbal (Pt selectively non-verbal. Nods appropriately to questions. Has been conversant with nursing staff)     Objective:     Vital Signs (Most Recent):  Temp: 99.3 °F (37.4 °C) (02/13/20 2305)  Pulse: 92 (02/14/20 0305)  Resp: 17 (02/14/20 0305)  BP: 122/65 (02/14/20 0305)  SpO2: 100 % (02/14/20 0305) Vital Signs (24h Range):  Temp:  [96.1 °F (35.6 °C)-99.3 °F (37.4 °C)] 99.3 °F (37.4 °C)  Pulse:  [] 92  Resp:  [11-26] 17  SpO2:  [92 %-100 %] 100 %  BP: (122-149)/(64-99) 122/65   Weight: 51.8 kg (114 lb 3.2 oz)  Body mass index is 18.43 kg/m².      Intake/Output Summary (Last 24 hours) at 2/14/2020 0325  Last data " filed at 2/13/2020 1730  Gross per 24 hour   Intake 160 ml   Output 925 ml   Net -765 ml       Physical Exam   Constitutional: He is oriented to person, place, and time. He appears well-developed. No distress.   Chronically ill appearing   HENT:   Head: Normocephalic and atraumatic.   Right Ear: External ear normal.   Left Ear: External ear normal.   Nose: Nose normal.   Mouth/Throat: Oropharynx is clear and moist.   Eyes: Conjunctivae and EOM are normal.   Neck: Normal range of motion. Neck supple.   Cardiovascular: Normal rate, regular rhythm, normal heart sounds and intact distal pulses.   RUE AV fistula, +bruit, +thrill   Pulmonary/Chest: Effort normal and breath sounds normal. No respiratory distress. He has no wheezes. He has no rales.   Abdominal: Soft. Bowel sounds are normal. He exhibits no distension. There is no tenderness.   Peg tube present   Musculoskeletal: He exhibits no edema or tenderness.   Left BKA   Neurological: He is alert and oriented to person, place, and time.   Skin: Skin is warm and dry. Capillary refill takes less than 2 seconds. He is not diaphoretic.   Nursing note and vitals reviewed.      Vents:  Vent Mode: Spont (01/30/20 1205)  Ventilator Initiated: Yes (01/20/20 1327)  Set Rate: 12 BPM (01/30/20 0739)  Vt Set: 340 mL (01/24/20 1403)  Pressure Support: 7 cmH20 (01/30/20 1205)  PEEP/CPAP: 5 cmH20 (01/30/20 1205)  Oxygen Concentration (%): 21 (01/30/20 1205)  Peak Airway Pressure: 12 cmH2O (01/30/20 1205)  Plateau Pressure: 17 cmH20 (01/30/20 1205)  Total Ve: 5.67 mL (01/30/20 1205)  F/VT Ratio<105 (RSBI): (!) 33.33 (01/30/20 1205)  Lines/Drains/Airways     Drain                 Hemodialysis AV Fistula Right upper arm -- days         Hemodialysis AV Fistula Right upper arm -- days         NG/OG Tube 02/07/20 0815 nasogastric Right nostril 6 days         Gastrostomy/Enterostomy 02/12/20 0950 Gastrostomy tube w/ balloon midline 1 day          Peripheral Intravenous Line                  Peripheral IV - Single Lumen 02/12/20 0210 20 G Left Antecubital 2 days         Peripheral IV - Single Lumen 02/13/20 2025 20 G Left Upper Arm less than 1 day              Significant Labs:    CBC/Anemia Profile:  Recent Labs   Lab 02/13/20 2025 02/14/20  0226 02/14/20  0248   WBC 7.53 8.71  8.71 6.09   HGB 11.9* 10.6*  10.6* 9.1*   HCT 37.9* 34.0*  34.0* 30.3*    228  228 198   MCV 89 90  90 92   RDW 15.9* 15.9*  15.9* 16.2*        Chemistries:  Recent Labs   Lab 02/12/20  0651 02/13/20  0403 02/14/20 0226   * 137 136   K 4.0 3.7 3.8   CL 93* 98 97   CO2 25 24 21*   BUN 72* 32* 49*   CREATININE 7.2* 4.8* 6.3*   CALCIUM 10.3 10.4 10.4   ALBUMIN 2.3* 2.4* 2.3*   MG 2.3 2.2 2.3   PHOS 2.6* 3.7 4.6*       All pertinent labs within the past 24 hours have been reviewed.    Significant Imaging:  I have reviewed and interpreted all pertinent imaging results/findings within the past 24 hours.      ABG  No results for input(s): PH, PO2, PCO2, HCO3, BE in the last 168 hours.  Assessment/Plan:     Neuro  Seizure  --witnessed tonic clonic sz in ED with subsequent 24h EEG showing L sided structural lesion and underlying epileptiform potential  --repeat EEG on 01/27 shows diffuse slowing, though no focal activity seen on EEG  --head CT without acute intracranial abnormalities  --continue keppra 250mg BID per neurology recommendations    Pulmonary  Aspiration pneumonia  --fever with infiltrates on cxry 2/11 and reported increase in sputum production at that time  --started on 7 day course of augmentin (end date 2/18)    Cardiac/Vascular  Renovascular hypertension  --currently normotensive off all agents (home coreg previous on hold due to borderline BP)  --continue to hold home coreg in setting of recurrent UGIB  --midodrine prior to HD sessions    Chronic diastolic heart failure  --Last echo done at [facility] [place] 8/2/19, EF>55%, bi-atrial enlargement  --home BB on hold      Renal/  ESRD on dialysis  --nephrology  following for management    Endocrine  Severe malnutrition  --s/p PEG placement 2/12  --TF on hold due to UGIB    GI  * UGIB (upper gastrointestinal bleed)  --recurrent upper GIB; multiple episodes of coffee ground emesis reported by nursing on 2/13  --last EGD 11/2019 showed grade D esophagitis without active bleeding; has not been scoped this hospitalization   --down-trend in hgb (12.5 > 11.9 > 10.6), however remains hemodynamically stable  --continue protonix to 40mg BID  --monitor CBC Q6, transfuse prn (2 units PRBCs matched in event they are needed)  --GI to evaluate today     Critical Care Daily Checklist:    A: Awake: RASS Goal/Actual Goal: RASS Goal: 0-->alert and calm  Actual: Maddox Agitation Sedation Scale (RASS): Alert and calm   B: Spontaneous Breathing Trial Performed? Spon. Breathing Trial Initiated?: Initiated (01/30/20 1205)   C: SAT & SBT Coordinated?  n/a                      D: Delirium: CAM-ICU Overall CAM-ICU: Positive   E: Early Mobility Performed? Yes   F: Feeding Goal: Goals: Meet % EEN, EPN  Status: Nutrition Goal Status: goal met   Current Diet Order   Procedures    Diet NPO Except for: Medication     Order Specific Question:   Except for     Answer:   Medication      AS: Analgesia/Sedation    T: Thromboembolic Prophylaxis scds   H: HOB > 300 Yes   U: Stress Ulcer Prophylaxis (if needed) ppi   G: Glucose Control ssi   B: Bowel Function Stool Occurrence: 1   I: Indwelling Catheter (Lines & Arcos) Necessity piv x2, peg   D: De-escalation of Antimicrobials/Pharmacotherapies     Plan for the day/ETD GI eval    Code Status:  Family/Goals of Care: Full Code  Pt's sister updated yesterday evening     Patient to be evaluated by and further recommendations per Dr. Whelan.     Critical Care Time: 35 minutes  Critical secondary to Patient has a condition that poses threat to life and bodily function: upper GIB      Critical care was time spent personally by me on the following activities:  development of treatment plan with patient or surrogate and bedside caregivers, discussions with consultants, evaluation of patient's response to treatment, examination of patient, ordering and performing treatments and interventions, ordering and review of laboratory studies, ordering and review of radiographic studies, pulse oximetry, re-evaluation of patient's condition. This critical care time did not overlap with that of any other provider or involve time for any procedures.     Ina Joseph NP  Critical Care Medicine  [facility]

## 2020-02-14 NOTE — CARE UPDATE
Patient arrived to [facility] from [facility] ED >>>[facility] >>> NPU >>> [facility]    Type of stroke/diagnosis:  Upper GI Bleed    TPA start and end time (if applicable) N/A    Thrombectomy start and end time (if applicable) N/A    Current symptoms: Vomiting coffee ground emesis    Skin assessment done: Y   Wounds noted: Fistula R Arm, below the knee amputation,   CRESENCIO Armband Applied: Yes    NCC notified: Ina Joseph NP

## 2020-02-14 NOTE — TREATMENT PLAN
EGD done today    Impression:           - Normal examined duodenum.                        - Non-bleeding erosive gastropathy.                        - 2 cm hiatal hernia. Biopsied.                        - Intact gastrostomy with a patent G-tube present.  Recommendation:                          - Use Protonix (pantoprazole) 40 mg via pegtube                         every 12 hours.                        - Consider avoiding all non-steroidal                         anti-inflammatory drugs ( ibuprofen, naproxen,                         etc.), unless needed for cardiovascular protection.                         O.K to take aspirin if needed for cardiovascular                         protection.    We will sign off, please contact with further questions or concerns.

## 2020-02-14 NOTE — ASSESSMENT & PLAN NOTE
--Last [place] done [place] [facility] [place]19, EF>55%, bi-atrial enlargement  --home BB on hold

## 2020-02-14 NOTE — PLAN OF CARE
POC reviewed with pt at 1400. Pt verbalized understanding. Questions and concerns addressed. No acute events today. Pt progressing toward goals. Will continue to monitor. EGD complete - negative for bleed. H/H stable. See flowsheets for full assessment and VS info.

## 2020-02-14 NOTE — TRANSFER OF CARE
"Anesthesia Transfer of Care Note    Patient: Vaughn Retana    Procedure(s) Performed: Procedure(s) (LRB):  EGD (ESOPHAGOGASTRODUODENOSCOPY) (N/A)    Patient location: ICU    Anesthesia Type: general    Transport from OR: Transported from OR on 6-10 L/min O2 by face mask with adequate spontaneous ventilation    Post pain: adequate analgesia    Post assessment: no apparent anesthetic complications    Post vital signs: stable    Level of consciousness: awake    Nausea/Vomiting: no nausea/vomiting    Complications: none    Transfer of care protocol was followed      Last vitals:   Visit Vitals  BP (!) 154/67 (BP Location: Left arm, Patient Position: Lying)   Pulse 87   Temp 36.7 °C (98 °F) (Temporal)   Resp 16   Ht 5' 6" (1.676 m)   Wt 51.8 kg (114 lb 3.2 oz)   SpO2 100%   BMI 18.43 kg/m²     "

## 2020-02-15 NOTE — ASSESSMENT & PLAN NOTE
--recurrent upper GIB; multiple episodes of coffee ground emesis reported by nursing on 2/13  --last EGD 11/2019 showed grade D esophagitis without active bleeding; has not been scoped this hospitalization   --down-trend in hgb (12.5 > 11.9 > 10.6), however remains hemodynamically stable  --continue protonix to 40mg BID  --monitor CBC, transfuse for Hgb <7, has not required any transfusions   --EGD on 2/14 with non-bleeding erosive gastropathy & hiatal hernia that was biopsied. No active bleed

## 2020-02-15 NOTE — PLAN OF CARE
POC reviewed with pt at 0500. Pt verbalized understanding but showed evidence of needing reinforcement. TF running @ 10 ml/hr. No goal given. MAPs maintained >65.  Questions and concerns addressed. No acute events overnight. Pt progressing toward goals. Will continue to monitor. See flowsheets for full assessment and VS info

## 2020-02-15 NOTE — PROGRESS NOTES
[facility]  Critical Care Medicine  Progress Note    Patient Name: Vaughn Retana  MRN: [id number]  Admission Date: 1/20/2020  Hospital Length of Stay: 26 days  Code Status: Full Code  Attending Provider: Bertha Handley MD  Primary Care Provider: Cori Randall MD   Principal Problem: UGIB (upper gastrointestinal bleed)    Subjective:     HPI:  Patient is a 54 year old male with extensive medical history including ESRD on dialysis MWF (has not received it today), L below knee amputation 2/2 osteomyelitis, dCHF (last echo 8/2/19 [facility]), GERD, h/o multiple ICH w/o residual deficits, and multiple instances of GI bleed (last EGD 11/12/19, grade D esophagitis) who presents to ED [facility] nursing home due to altered mental status. From NH report, nursing home staff went to wake the patient for his morning dialysis. He was confused, lethargic, had a moderate amount of brown colored emesis in his trash can. Patient last got dialysis on Friday. Patient is normally able to ambulate on his own and is alert and oriented; nursing staff reports that he appeared normal day before admission. The patient was admitted to critical care with concerns of new onset seizures and was intubated for airway protection. Patient was started on vanc, rocephin, ampicillin, and acyclovir to cover for meningitis. Patient had spot EEG while on propofol in ED which did not show seizure activity. MRI done showed chronic changes but no acute abnormalities. Lumbar puncture was done after MRI. CSF labs were not convincing for bacterial or viral meningitis and antibiotics were de-escalated. Continuous EEG was done after propofol was stopped which showed epileptiform discharges and the patient was continued on keppra with neurology assisting with management. Nephrology was consulted for HD management. Pt became febrile the night of 01/22; blood cultures repeated and restarted on Vanc and Zosyn. HSV PCR neg so Acyclovir discontinued.  "Repeat 24 hr EEG shows "multifocal slowing consistent with multiple areas of focal cortical dysfunction and occasional epileptiform discharges in the left frontotemporal region consistent with a potential seizure focus in this area" with no electrographic seizures. On 1/29 the patient developed BRBPR; GI consulted, no scope indicated at that time. The patient was extubated on 1/30. Repeat EEG was performed on 2/1 which showed no seizure activity. The patient continued to follow swallow studies and had a PEG tube placed on 2/12.     On 2/13 the patient had multiple episodes of coffee ground emesis. Critical Care Medicine was consulted for further management.     Hospital/ICU Course:  Patient transferred back to ICU evening of 2/13 following recurrence of hematemesis and UGIB. No further episodes of coffee ground emesis since transfer to ICU. Down-trend in hgb noted (12.5 > 11.9 > 10.6), however patient remained hemodynamically stable. EGD by GI with non-bleeding erosive gastropathy and a hiatal hernia that was biopsied. Hgb remained stable. Tube feeds restarted. Stable for step down from ICU.     Interval History/Significant Events: EGD completed yesterday with non-bleeding erosive gastropathy. Hgb remains stable. Tube feeds restarted. PT/OT/SLP consulted.    Review of Systems   Constitutional: Negative for fever.   Respiratory: Negative for cough and shortness of breath.    Cardiovascular: Negative for chest pain and palpitations.   Gastrointestinal: Negative for abdominal pain, blood in stool, diarrhea, nausea and vomiting.   Musculoskeletal: Negative for myalgias.   Neurological: Negative for dizziness and light-headedness.   Psychiatric/Behavioral: Negative for agitation.     Objective:     Vital Signs (Most Recent):  Temp: 97.9 °F (36.6 °C) (02/15/20 1105)  Pulse: 80 (02/15/20 1305)  Resp: (!) 8 (02/15/20 1305)  BP: (!) 143/66 (02/15/20 1305)  SpO2: 99 % (02/15/20 1305) Vital Signs (24h Range):  Temp:  [97.6 °F " (36.4 °C)-98 °F (36.7 °C)] 97.9 °F (36.6 °C)  Pulse:  [73-89] 80  Resp:  [8-45] 8  SpO2:  [93 %-100 %] 99 %  BP: (111-179)/(53-74) 143/66   Weight: 51.8 kg (114 lb 3.2 oz)  Body mass index is 18.43 kg/m².      Intake/Output Summary (Last 24 hours) at 2/15/2020 1331  Last data filed at 2/15/2020 1305  Gross per 24 hour   Intake 420 ml   Output --   Net 420 ml       Physical Exam   Constitutional: He is oriented to person, place, and time. He appears well-developed. No distress.   Chronically ill appearing   HENT:   Head: Normocephalic and atraumatic.   Mouth/Throat: Oropharynx is clear and moist.   Eyes: Pupils are equal, round, and reactive to light. Conjunctivae and EOM are normal.   Neck: Normal range of motion. Neck supple.   Cardiovascular: Normal rate, regular rhythm, normal heart sounds and intact distal pulses.   RUE AV fistula, +bruit, +thrill   Pulmonary/Chest: Effort normal and breath sounds normal. No respiratory distress. He has no wheezes. He has no rales.   Abdominal: Soft. Bowel sounds are normal. He exhibits no distension. There is no tenderness.   Peg tube present   Musculoskeletal: He exhibits no edema or tenderness.   Left BKA   Neurological: He is alert and oriented to person, place, and time.   Awake and alert. Follows commands.    Skin: Skin is warm and dry. Capillary refill takes less than 2 seconds. He is not diaphoretic.   Nursing note and vitals reviewed.      Vents:  Vent Mode: Spont (01/30/20 1205)  Ventilator Initiated: Yes (01/20/20 1327)  Set Rate: 12 BPM (01/30/20 0739)  Vt Set: 340 mL (01/24/20 1403)  Pressure Support: 7 cmH20 (01/30/20 1205)  PEEP/CPAP: 5 cmH20 (01/30/20 1205)  Oxygen Concentration (%): 21 (01/30/20 1205)  Peak Airway Pressure: 12 cmH2O (01/30/20 1205)  Plateau Pressure: 17 cmH20 (01/30/20 1205)  Total Ve: 5.67 mL (01/30/20 1205)  F/VT Ratio<105 (RSBI): (!) 33.33 (01/30/20 1205)  Lines/Drains/Airways     Drain                 Hemodialysis AV Fistula Right upper arm  -- days         Hemodialysis AV Fistula Right upper arm -- days         Gastrostomy/Enterostomy 02/12/20 0950 Gastrostomy tube w/ balloon midline 3 days          Peripheral Intravenous Line                 Peripheral IV - Single Lumen 02/12/20 0210 20 G Left Antecubital 3 days         Peripheral IV - Single Lumen 02/13/20 2025 20 G Left Upper Arm 1 day              Significant Labs:    CBC/Anemia Profile:  Recent Labs   Lab 02/14/20  2106 02/15/20  0153 02/15/20  0904   WBC 3.78* 6.13 5.21   HGB 7.9* 11.2* 11.4*   HCT 24.7* 36.4* 38.0*    262 190   MCV 88 89 93   RDW 15.9* 15.9* 16.0*        Chemistries:  Recent Labs   Lab 02/14/20  0226 02/14/20  0248 02/15/20  0153    138 138   K 3.8 3.4* 3.9   CL 97 107 97   CO2 21* 18* 24   BUN 49* 41* 59*   CREATININE 6.3* 5.0* 8.0*   CALCIUM 10.4 7.4* 10.2   ALBUMIN 2.3* 1.7* 2.4*   MG 2.3 1.8 2.4   PHOS 4.6* 3.5 5.3*       All pertinent labs within the past 24 hours have been reviewed.    Significant Imaging:  I have reviewed all pertinent imaging results/findings within the past 24 hours.      ABG  No results for input(s): PH, PO2, PCO2, HCO3, BE in the last 168 hours.  Assessment/Plan:     Neuro  Aphasia  CT head showed no acute changes  Patent follow commands but aphasic  Diffuse muscle weakness s.p extubation   Could be due to debility vs keppra vs patient baseline   Failed swallow eval   CK normal   Per nursing home patient was talkative and following commands prior to his admission   Off note patient has history of aphasia     - NPO and tube feed  - decreasing keppra to 500 daily   - repeat CT Head   - Neurology reconsulted in concern for seizure and stroke.     Seizure  --witnessed tonic clonic sz in ED with subsequent 24h EEG showing L sided structural lesion and underlying epileptiform potential  --repeat EEG on 01/27 shows diffuse slowing, though no focal activity seen on EEG  --head CT without acute intracranial abnormalities  --continue keppra 250mg  BID per neurology recommendations    Pulmonary  Aspiration pneumonia  --fever with infiltrates on cxry 2/11 and reported increase in sputum production at that time  --started on 7 day course of augmentin (end date 2/18)    Cardiac/Vascular  Renovascular hypertension  --currently normotensive off all agents (home coreg previous on hold due to borderline BP)  --continue to hold home coreg in setting of recurrent UGIB  --midodrine prior to HD sessions    Chronic diastolic heart failure  --[place] [facility] [place], EF>55%, bi-atrial enlargement  --home BB on hold      Renal/  ESRD on dialysis  --nephrology following for management    Endocrine  Severe malnutrition  --s/p PEG placement 2/12  --restart TF and advance as tolerated     GI  * UGIB (upper gastrointestinal bleed)  --recurrent upper GIB; multiple episodes of coffee ground emesis reported by nursing on 2/13  --last EGD 11/2019 showed grade D esophagitis without active bleeding; has not been scoped this hospitalization   --down-trend in hgb (12.5 > 11.9 > 10.6), however remains hemodynamically stable  --continue protonix to 40mg BID  --monitor CBC, transfuse for Hgb <7, has not required any transfusions   --EGD on 2/14 with non-bleeding erosive gastropathy & hiatal hernia that was biopsied. No active bleed      Gastrointestinal hemorrhage with hematemesis  GERD with esophagitis    Patient with reports of coffee ground emesis prior to transport to ED. In ED, dark brown contents suctioned from stomach. Hemoglobin remains stable at 15 and patient is hypertensive. Patient is well known to GI. His most recent scope was in November that just showed esophagitis similar to his previous EGD. GI consulted and appreciate recommendations. Currently on 40 mg BID. 01/29 - patient had BRBPR ~08:30 per nurse. GI consulted - no scope indicated at this time. Will switch to PPI from famotidine.    - H/H stable, will space CBC to daily  - PPI restarted 01/29    GERD with  esophagitis  Continue Protonix  Follow up with GI out patient      I spent >35 minutes reviewing patient records, examining, and counseling the patient with greater than 50% of the time spent with direct patient care and coordination.      Yomaira Cesar NP  Critical Care Medicine  [facility]

## 2020-02-15 NOTE — PROGRESS NOTES
Vaughn Retana is a 54 y.o. male patient.    Scheduled Meds:   amoxicillin-clavulanate 500-125mg  1 tablet Per NG tube Daily    levetiracetam IVPB  250 mg Intravenous Q12H    midodrine  5 mg Per NG tube Every Mon, Wed, Fri    pantoprazole  40 mg Per G Tube BID       Review of patient's allergies indicates:   Allergen Reactions    Fosrenol [lanthanum] Nausea And Vomiting     Nausea and vomiting       Past Medical History:   Diagnosis Date    Amputation stump pain 9/10/2013    Aspiration pneumonia 7/27/2015    Asterixis 11/8/2016    C. difficile colitis 8/7/2015    Cholelithiasis without obstruction 8/25/2015    Chronic diastolic heart failure     2-23-17   1 - Low normal to mildly depressed left ventricular systolic function (EF 50-55%).    2 - Right ventricular enlargement with mildly depressed systolic function.    3 - Left ventricular diastolic dysfunction.    4 - Right atrial enlargement.    5 - Severe tricuspid regurgitation.    6 - Pulmonary hypertension. The estimated PA systolic pressure is 86 mmHg.    7 - Increased central venous pressure.    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    Metabolic acidosis, IAG, reduced excretion of inorganic acids     Myoclonic jerking 9/20/2016    Noncompliance with medication regimen 12/4/2018    Secondary hyperparathyroidism (of renal origin)     Secondary pulmonary hypertension  3/23/2017    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    TB lung, latent 08/25/2015    Negative Quantiferon Gold 3-23-17     Past Surgical History:   Procedure Laterality Date    COLONOSCOPY      COLONOSCOPY N/A 4/4/2017    Procedure: COLONOSCOPY;  Surgeon: Walker Stern MD;  Location: [facility] ([address] [place] [address]);  Service: Endoscopy;  Laterality: N/A;  PA Systolic Pressure 85.56. HD Patient MWF, K+ lab prior to procedure.     ESOPHAGOGASTRODUODENOSCOPY N/A 6/12/2018    Procedure: EGD (ESOPHAGOGASTRODUODENOSCOPY);  Surgeon: Man Galicia MD;  Location: [facility] ([address] [place] [address]);  Service: Endoscopy;  Laterality: N/A;  EGD in 8-12 weeks with Dr. Galicia on 4th floor for follow up erosive esophagitis and Mejia's surveillance.    Patient should be on Pantoprazole 40mg every 12 hours or the equivulant of another PPI    awaiting for patient to reply back regarding changing    ESOPHAGOGASTRODUODENOSCOPY N/A 3/7/2019    Procedure: EGD (ESOPHAGOGASTRODUODENOSCOPY);  Surgeon: Mna Galicia MD;  Location: [facility] ([address] [place] [address]);  Service: Endoscopy;  Laterality: N/A;    ESOPHAGOGASTRODUODENOSCOPY N/A 9/23/2019    Procedure: EGD (ESOPHAGOGASTRODUODENOSCOPY);  Surgeon: Keanu Rainey MD;  Location: [address]);  Service: Endoscopy;  Laterality: N/A;    ESOPHAGOGASTRODUODENOSCOPY N/A 10/2/2019    Procedure: EGD (ESOPHAGOGASTRODUODENOSCOPY);  Surgeon: Kevin De La Paz MD;  Location: [address]);  Service: Endoscopy;  Laterality: N/A;    ESOPHAGOGASTRODUODENOSCOPY N/A 11/12/2019    Procedure: EGD (ESOPHAGOGASTRODUODENOSCOPY);  Surgeon: Kevin De La Paz MD;  Location: [address]);  Service: Endoscopy;  Laterality: N/A;    FOOT AMPUTATION THROUGH METATARSAL      left foot    LEG AMPUTATION THROUGH KNEE  12/18/2013    left BKA    R AVF  9/12/12    UPPER GASTROINTESTINAL ENDOSCOPY        reports that he has quit smoking. He has a 10.00 pack-year smoking history. He has never used smokeless  tobacco. He reports that he does not drink alcohol or use drugs.   Family History   Problem Relation Age of Onset    Early death Mother     Kidney disease Father     Hypertension Father     Heart disease Father     Hyperlipidemia Father     Diabetes Brother     Alcohol abuse Maternal Grandmother     Diabetes Maternal Grandfather     Hypertension Sister     Melanoma Neg Hx           Vital Signs Range (Last 24H):  Temp:  [97.6 °F (36.4 °C)-98 °F (36.7 °C)]   Pulse:  [73-89]   Resp:  [8-43]   BP: (105-179)/()   SpO2:  [93 %-100 %]     I & O (Last 24H):    Intake/Output Summary (Last 24 hours) at 2/15/2020 0959  Last data filed at 2/15/2020 0900  Gross per 24 hour   Intake 440 ml   Output --   Net 440 ml           Physical Exam:  Constitutional: He is oriented to person, place, and time. He appears well-developed. No distress.   Chronically ill appearing      Head: Normocephalic and atraumatic.   Right Ear: External ear normal.   Left Ear: External ear normal.   Nose: Nose normal.   Mouth/Throat: Oropharynx is clear and moist.   Eyes: Conjunctivae and EOM are normal.   Neck: Normal range of motion. Neck supple.   Cardiovascular: Normal rate, regular rhythm, normal heart sounds and intact distal pulses.   RUE AV fistula, +bruit, +thrill   Pulmonary/Chest: Effort normal and breath sounds normal. No respiratory distress. He has no wheezes. He has no rales.   Abdominal: Soft. Bowel sounds are normal. He exhibits no distension. There is no tenderness.   Peg tube present   Musculoskeletal: He exhibits no edema or tenderness.   Left BKA   Neurological: He is alert and oriented to person, place, and time.   Skin: Skin is warm and dry. Capillary refill takes less than 2 seconds. He is not diaphoretic.   Nursing note and vitals reviewed.       Laboratory:  CBC:   Recent Labs   Lab 02/15/20  0153 02/15/20  0904   WBC 6.13 5.21   RBC 4.07* 4.09*   HGB 11.2* 11.4*   HCT 36.4* 38.0*     --    MCV 89 93   MCH  27.5 27.9   MCHC 30.8* 30.0*     BMP:   Recent Labs   Lab 02/15/20  0153   GLU 83      K 3.9   CL 97   CO2 24   BUN 59*   CREATININE 8.0*   CALCIUM 10.2   MG 2.4     ABGs: No results for input(s): PH, PCO2, PO2, HCO3, POCSATURATED, BE in the last 168 hours.      Diagnostic Results:    ESRD on dialysis  The patient is a 53 yo AAM admitted to MICU with seizure activity after presenting to the ED on 1/20 after being found with alter mental status at his NH facility Monday morning. The patient is currently on continuous EEG monitoring. Last dialyze on MWF.      Nephrology History  iHD Schedule: MWF  Unit/MD: [facility]/ Dr. Lemus  Duration: 3.5 hrs  EDW: ?  Access: [facility] AV[facility]   Resodial Renal Function: Yes (per records)     Plan:   - HD today  - Daily renal function panels   - Continue to monitor intake and output, daily weights   - Avoid nephrotoxic medication and renal dose medications to GFR             Bc Ross  2/15/2020

## 2020-02-15 NOTE — SUBJECTIVE & OBJECTIVE
Interval History/Significant Events: EGD completed yesterday with non-bleeding erosive gastropathy. Hgb remains stable. Tube feeds restarted. PT/OT/SLP consulted.    Review of Systems   Constitutional: Negative for fever.   Respiratory: Negative for cough and shortness of breath.    Cardiovascular: Negative for chest pain and palpitations.   Gastrointestinal: Negative for abdominal pain, blood in stool, diarrhea, nausea and vomiting.   Musculoskeletal: Negative for myalgias.   Neurological: Negative for dizziness and light-headedness.   Psychiatric/Behavioral: Negative for agitation.     Objective:     Vital Signs (Most Recent):  Temp: 97.9 °F (36.6 °C) (02/15/20 1105)  Pulse: 80 (02/15/20 1305)  Resp: (!) 8 (02/15/20 1305)  BP: (!) 143/66 (02/15/20 1305)  SpO2: 99 % (02/15/20 1305) Vital Signs (24h Range):  Temp:  [97.6 °F (36.4 °C)-98 °F (36.7 °C)] 97.9 °F (36.6 °C)  Pulse:  [73-89] 80  Resp:  [8-45] 8  SpO2:  [93 %-100 %] 99 %  BP: (111-179)/(53-74) 143/66   Weight: 51.8 kg (114 lb 3.2 oz)  Body mass index is 18.43 kg/m².      Intake/Output Summary (Last 24 hours) at 2/15/2020 1331  Last data filed at 2/15/2020 1305  Gross per 24 hour   Intake 420 ml   Output --   Net 420 ml       Physical Exam   Constitutional: He is oriented to person, place, and time. He appears well-developed. No distress.   Chronically ill appearing   HENT:   Head: Normocephalic and atraumatic.   Mouth/Throat: Oropharynx is clear and moist.   Eyes: Pupils are equal, round, and reactive to light. Conjunctivae and EOM are normal.   Neck: Normal range of motion. Neck supple.   Cardiovascular: Normal rate, regular rhythm, normal heart sounds and intact distal pulses.   RUE AV fistula, +bruit, +thrill   Pulmonary/Chest: Effort normal and breath sounds normal. No respiratory distress. He has no wheezes. He has no rales.   Abdominal: Soft. Bowel sounds are normal. He exhibits no distension. There is no tenderness.   Peg tube present    Musculoskeletal: He exhibits no edema or tenderness.   Left BKA   Neurological: He is alert and oriented to person, place, and time.   Awake and alert. Follows commands.    Skin: Skin is warm and dry. Capillary refill takes less than 2 seconds. He is not diaphoretic.   Nursing note and vitals reviewed.      Vents:  Vent Mode: Spont (01/30/20 1205)  Ventilator Initiated: Yes (01/20/20 1327)  Set Rate: 12 BPM (01/30/20 0739)  Vt Set: 340 mL (01/24/20 1403)  Pressure Support: 7 cmH20 (01/30/20 1205)  PEEP/CPAP: 5 cmH20 (01/30/20 1205)  Oxygen Concentration (%): 21 (01/30/20 1205)  Peak Airway Pressure: 12 cmH2O (01/30/20 1205)  Plateau Pressure: 17 cmH20 (01/30/20 1205)  Total Ve: 5.67 mL (01/30/20 1205)  F/VT Ratio<105 (RSBI): (!) 33.33 (01/30/20 1205)  Lines/Drains/Airways     Drain                 Hemodialysis AV Fistula Right upper arm -- days         Hemodialysis AV Fistula Right upper arm -- days         Gastrostomy/Enterostomy 02/12/20 0950 Gastrostomy tube w/ balloon midline 3 days          Peripheral Intravenous Line                 Peripheral IV - Single Lumen 02/12/20 0210 20 G Left Antecubital 3 days         Peripheral IV - Single Lumen 02/13/20 2025 20 G Left Upper Arm 1 day              Significant Labs:    CBC/Anemia Profile:  Recent Labs   Lab 02/14/20  2106 02/15/20  0153 02/15/20  0904   WBC 3.78* 6.13 5.21   HGB 7.9* 11.2* 11.4*   HCT 24.7* 36.4* 38.0*    262 190   MCV 88 89 93   RDW 15.9* 15.9* 16.0*        Chemistries:  Recent Labs   Lab 02/14/20  0226 02/14/20  0248 02/15/20  0153    138 138   K 3.8 3.4* 3.9   CL 97 107 97   CO2 21* 18* 24   BUN 49* 41* 59*   CREATININE 6.3* 5.0* 8.0*   CALCIUM 10.4 7.4* 10.2   ALBUMIN 2.3* 1.7* 2.4*   MG 2.3 1.8 2.4   PHOS 4.6* 3.5 5.3*       All pertinent labs within the past 24 hours have been reviewed.    Significant Imaging:  I have reviewed all pertinent imaging results/findings within the past 24 hours.

## 2020-02-15 NOTE — ASSESSMENT & PLAN NOTE
--Last echo done at [facility] 8/2/19, EF>55%, bi-atrial enlargement  --home BB on hold

## 2020-02-16 NOTE — PLAN OF CARE
Problem: Adult Inpatient Plan of Care  Goal: Plan of Care Review  Outcome: Ongoing, Progressing     Pt AAOx4. Moves all extremities well. Left above knee amputation. No c/o pain, N/V, or numbness/tingling in extremities. PEG in place. Patency checked and no resistance met. 100mL water flush administered. Novasource Renal continuous at 35 mL/hr. Goal rate of 45 mL/hr. Will increase as tolerated. VS stable. Suction at bedside. BG checks q 2 hours. All safety measures maintained through the shift. Will continue to monitor.

## 2020-02-16 NOTE — PROGRESS NOTES
Hospital Medicine   Progress note      Team: [facility] HOSP MED G Mariposa Dalton MD   Admit Date: 1/20/2020   Hospital Day: 27  JUAN: 2/14/2020   Code status: Full Code   Principal Problem: UGIB (upper gastrointestinal bleed)     Summary: Patient is a 54 year old male with extensive medical history including ESRD on dialysis MWF (has not received it today), L below knee amputation 2/2 osteomyelitis, dCHF (last echo 8/2/19 [facility] [place]), GERD, h/o multiple ICH w/o residual deficits, and multiple instances of GI bleed (last EGD 11/12/19, esophagitis) who presents to ED [facility] nursing home due to altered mental status. From NH report, nursing home staff went to wake the patient for his morning dialysis. He was confused, lethargic, had a moderate amount of brown colored emesis in his trash can. Patient last got dialysis on Friday. Patient is normally able to ambulate on his own and is alert and oriented; nursing staff reports that he appeared normal day before admission. Patient admitted to critical care with concerns of new onset seizures and s/p intubation for airway protection. Patient was started on vanc, rocephin, ampicillin, and acyclovir to cover for meningitis. Patient had spot EEG while on propofol in ED which did not show seizure activity. MRI done showed chronic changes but no acute abnormalities. Lumbar puncture was done after MRI. CSF labs were not convincing for bacterial or viral meningitis and antibiotics were weaned down. Continuous EEG was done after propofol was stopped which showed epileptiform discharges. Per epilepsy, patient was given another dose of Keppra. Nephrology consulted and started HD. Pt became febrile the night of 01/22; blood cultures repeated and restarted on Vanc and Zosyn. HSV PCR neg so Acyclovir discontinued.  Neurology was consulted and pt was continued on Keppra but dose was decreased. 01/27 Patient's neuro exam is somewhat improved from day prior per nurse - opening eyes  "spontaneously and occasionally tracking movements, blinking to threat. 01/28 passed SBT today, plan to extubate 01/29. Repeat 24 hr EEG shows "multifocal slowing consistent with multiple areas of focal cortical dysfunction and occasional epileptiform discharges in the left frontotemporal region consistent with a potential seizure focus in this area" with no electrographic seizures. 01/29 Originally planned for extubation, but was deferred in light of BRBPR ~08:30 per nurse. GI consulted, no scope indicated at this time. Otherwise mental status improving in small increments daily. 01/30 Patient successfully extubated, though very weak. Unable to clear secretions effectively at this time, weak cough. 2/1- lethargic, EEG was done with no seizure activity, Keppra dose was decreased.  2/2 - pt was more awake after decreasing Keppra dose further to 250mg BID. Pt able to answer simple questions appropriate and follow simple commands. SLP following but pt continues to fail swallow study. NGT placed. Pt started on tube feeds and tolerating well. He continues to be lethargic at this time and requiring TF through NGT. At this time he will require PEG as he has had NGT TF for several days, with no improvement. IR consult placed. s/p PEG 2/12. Started on tube feeds and tolerating well. 02/13/2020, patient noted to have several episodes of coffee-ground emesis.  Patient was transferred to the ICU for closer monitoring.  Patient underwent EGD after GI was consulted.  EGD showed normal duodenum, nonbleeding erosive gastropathy, 2 cm hiatal hernia that was biopsied, intact gastrostomy.  Patient was stabilized overnight and transferred back to Hospital Medicine.  H&H has now remained stable.  No further signs of bleeding.  Tube feeds running at goal.  Patient is a lot more awake, continues to participate with SLP.  Diet now advanced to mechanically soft.    Interval hx:   Pt was seen and examined at bedside. Pt had no acute events " overnight, and no new complaints this morning. Pt remained hemodynamically stable and afebrile.  Patient is status post PEG tube placement on 02/12/2020 with tube feeds now running at goal.  No further nausea, vomiting, diarrhea, constipation, blood in stools or hematemesis.  Patient is noted to be a lot more awake, alert, participating in conversation.  He also continues to work with SLP and was able to passes bedside swallow.  Diet advanced to dental soft per SLP recommendations.    ROS (Positive in Bold, otherwise negative)  Constitutional: fever, chills, night sweats  CV: chest pain, edema, palpitations  Resp: SOB, cough, sputum production  GI: changes in appetite, NVDC, pain, melena, hematochezia, GERD, hematemesis  : Dysuria, hematuria, urinary urgency, frequency  MSK: arthralgia/myalgia, joint swelling  Neuro/Psych: anxiety, depression    PEx   Temp:  [96.7 °F (35.9 °C)-98.5 °F (36.9 °C)]   Pulse:  []   Resp:  [12-37]   BP: (129-172)/(58-77)   SpO2:  [93 %-100 %]      I & O (Last 24H):     Intake/Output Summary (Last 24 hours) at 2/16/2020 1426  Last data filed at 2/16/2020 0200  Gross per 24 hour   Intake 505 ml   Output --   Net 505 ml       General:  male  in no acute distress. Nontoxic. Resting in bed. Cooperative.  HEENT: NCAT. PERRL. EOMI. Sclera Anicteric.  CVS: RRR. Normal S1 S2. No murmurs  Pulm: CTAB. Normal respiratory effort. No wheezes, rhonchi, or crackles.  Abdomen: Soft. Non-distended. No tenderness to palpation. No rebound or guarding. +BS.  Peg tube in place  Extremities: No edema. No cyanosis. Full ROM.  Neuro: Alert, oriented x 3, Spont mvt of all extremities with no focal deficits noted. Globally weak    Recent Results (from the past 24 hour(s))   POCT glucose    Collection Time: 02/15/20  3:10 PM   Result Value Ref Range    POCT Glucose 130 (H) 70 - 110 mg/dL   POCT glucose    Collection Time: 02/15/20  8:06 PM   Result Value Ref Range    POCT Glucose 147 (H) 70 -  110 mg/dL   POCT glucose    Collection Time: 02/15/20  9:45 PM   Result Value Ref Range    POCT Glucose 182 (H) 70 - 110 mg/dL   POCT glucose    Collection Time: 02/16/20 12:29 AM   Result Value Ref Range    POCT Glucose 171 (H) 70 - 110 mg/dL   POCT glucose    Collection Time: 02/16/20  2:16 AM   Result Value Ref Range    POCT Glucose 135 (H) 70 - 110 mg/dL   POCT glucose    Collection Time: 02/16/20  4:34 AM   Result Value Ref Range    POCT Glucose 134 (H) 70 - 110 mg/dL   Magnesium    Collection Time: 02/16/20  5:06 AM   Result Value Ref Range    Magnesium 2.5 1.6 - 2.6 mg/dL   Protime-INR    Collection Time: 02/16/20  5:06 AM   Result Value Ref Range    Prothrombin Time 11.6 9.0 - 12.5 sec    INR 1.1 0.8 - 1.2   Renal function panel    Collection Time: 02/16/20  5:06 AM   Result Value Ref Range    Glucose 137 (H) 70 - 110 mg/dL    Sodium 137 136 - 145 mmol/L    Potassium 3.7 3.5 - 5.1 mmol/L    Chloride 96 95 - 110 mmol/L    CO2 25 23 - 29 mmol/L    BUN, Bld 75 (H) 6 - 20 mg/dL    Calcium 9.8 8.7 - 10.5 mg/dL    Creatinine 9.3 (H) 0.5 - 1.4 mg/dL    Albumin 2.4 (L) 3.5 - 5.2 g/dL    Phosphorus 4.7 (H) 2.7 - 4.5 mg/dL    eGFR if African American 6.6 (A) >60 mL/min/1.73 m^2    eGFR if non African American 5.7 (A) >60 mL/min/1.73 m^2    Anion Gap 16 8 - 16 mmol/L   CBC auto differential    Collection Time: 02/16/20  5:06 AM   Result Value Ref Range    WBC 5.88 3.90 - 12.70 K/uL    RBC 4.00 (L) 4.60 - 6.20 M/uL    Hemoglobin 11.1 (L) 14.0 - 18.0 g/dL    Hematocrit 35.2 (L) 40.0 - 54.0 %    Mean Corpuscular Volume 88 82 - 98 fL    Mean Corpuscular Hemoglobin 27.8 27.0 - 31.0 pg    Mean Corpuscular Hemoglobin Conc 31.5 (L) 32.0 - 36.0 g/dL    RDW 15.6 (H) 11.5 - 14.5 %    Platelets 301 150 - 350 K/uL    MPV 10.9 9.2 - 12.9 fL    Immature Granulocytes 0.3 0.0 - 0.5 %    Gran # (ANC) 3.8 1.8 - 7.7 K/uL    Immature Grans (Abs) 0.02 0.00 - 0.04 K/uL    Lymph # 1.0 1.0 - 4.8 K/uL    Mono # 0.6 0.3 - 1.0 K/uL    Eos # 0.4  0.0 - 0.5 K/uL    Baso # 0.02 0.00 - 0.20 K/uL    nRBC 0 0 /100 WBC    Gran% 65.5 38.0 - 73.0 %    Lymph% 17.3 (L) 18.0 - 48.0 %    Mono% 10.5 4.0 - 15.0 %    Eosinophil% 6.1 0.0 - 8.0 %    Basophil% 0.3 0.0 - 1.9 %    Differential Method Automated    POCT glucose    Collection Time: 02/16/20  6:16 AM   Result Value Ref Range    POCT Glucose 170 (H) 70 - 110 mg/dL   POCT glucose    Collection Time: 02/16/20 11:40 AM   Result Value Ref Range    POCT Glucose 201 (H) 70 - 110 mg/dL       Recent Labs   Lab 02/15/20  2145 02/16/20  0029 02/16/20  0216 02/16/20  0434 02/16/20  0616 02/16/20  1140   POCTGLUCOSE 182* 171* 135* 134* 170* 201*       Hemoglobin A1C   Date Value Ref Range Status   02/06/2020 4.7 4.0 - 5.6 % Final     Comment:     ADA Screening Guidelines:  5.7-6.4%  Consistent with prediabetes  >or=6.5%  Consistent with diabetes  High levels of fetal hemoglobin interfere with the HbA1C  assay. Heterozygous hemoglobin variants (HbS, HgC, etc)do  not significantly interfere with this assay.   However, presence of multiple variants may affect accuracy.     11/09/2019 4.0 4.0 - 5.6 % Final     Comment:     ADA Screening Guidelines:  5.7-6.4%  Consistent with prediabetes  >or=6.5%  Consistent with diabetes  High levels of fetal hemoglobin interfere with the HbA1C  assay. Heterozygous hemoglobin variants (HbS, HgC, etc)do  not significantly interfere with this assay.   However, presence of multiple variants may affect accuracy.     06/22/2019 4.7 4.0 - 5.6 % Final     Comment:     ADA Screening Guidelines:  5.7-6.4%  Consistent with prediabetes  >or=6.5%  Consistent with diabetes  High levels of fetal hemoglobin interfere with the HbA1C  assay. Heterozygous hemoglobin variants (HbS, HgC, etc)do  not significantly interfere with this assay.   However, presence of multiple variants may affect accuracy.     06/22/2019 4.7 4.0 - 5.6 % Final     Comment:     ADA Screening Guidelines:  5.7-6.4%  Consistent with  prediabetes  >or=6.5%  Consistent with diabetes  High levels of fetal hemoglobin interfere with the HbA1C  assay. Heterozygous hemoglobin variants (HbS, HgC, etc)do  not significantly interfere with this assay.   However, presence of multiple variants may affect accuracy.          Active Hospital Problems    Diagnosis  POA    *UGIB (upper gastrointestinal bleed) [K92.2]  Yes    Acute upper GI bleed [K92.2]  Yes    ESRD on dialysis [N18.6, Z99.2]  Not Applicable    Aphasia [R47.01]  Clinically Undetermined    Pressure injury due to medical device [T85.898A, L89.90]  Yes    Altered mental status [R41.82]  Yes    Seizure [R56.9]  Yes    Gastrointestinal hemorrhage with hematemesis [K92.0]  Yes    GERD with esophagitis [K21.0]  Yes    Severe malnutrition [E43]  Yes     Assessment and Plan  Severe Malnutrition in the context of Social/Environmental Circumstances     Related to (etiology):  Unknown etiology     Signs and Symptoms (as evidenced by):  Energy Intake: per pt, <75% intake over the last year  Body Fat Depletion: overall subcutaneous fat loss, moderate triceps fat loss and protruding patellas.  Muscle Mass Depletion:  moderate muscle wasting of interosseous, temporal, clavicle, calves and quadriceps  Weight Loss: 24% (39 lbs) x 1 year    Recommendations     Recommendation/Intervention: 1. Should pt be cleared for po diet, recommend renal diet with texture per SLP along with Novasource ONS TID. 2. Should pt require enteral feeding, initiate Novasource renal formula at 10ml/hr and advance 10ml Q4hrs to goal rate of 40ml/hr (provides 1920kcal, 87g pro, 691ml free water). Provide additional 500ml water flush/24 hrs. Hold for residuals >500ml.  Goals: 1. Pt to receive nutrition < 48 hrs.      Renovascular hypertension [I15.0]  Yes     Chronic    Chronic diastolic heart failure [I50.32]  Yes     Chronic     2-23-17    1 - Low normal to mildly depressed left ventricular systolic function (EF 50-55%).     2  - Right ventricular enlargement with mildly depressed systolic function.     3 - Left ventricular diastolic dysfunction.     4 - Right atrial enlargement.     5 - Severe tricuspid regurgitation.     6 - Pulmonary hypertension. The estimated PA systolic pressure is 86 mmHg.     7 - Increased central venous pressure.       Encephalopathy [G93.40]  Yes    Aspiration pneumonia [J69.0]  No      Resolved Hospital Problems    Diagnosis Date Resolved POA    Acute metabolic encephalopathy [G93.41] 01/23/2020 Yes    ESRD on hemodialysis [N18.6, Z99.2] 01/31/2020 Not Applicable     Chronic     MWF at [facility]            Assessment and Plan for problems addressed today:      amoxicillin-clavulanate 500-125mg  1 tablet Per NG tube Daily    levetiracetam IVPB  250 mg Intravenous Q12H    midodrine  5 mg Per NG tube Every Mon, Wed, Fri    pantoprazole  40 mg Per G Tube BID     albuterol-ipratropium, Dextrose 10% Bolus, Dextrose 10% Bolus, glucagon (human recombinant), insulin aspart U-100, ondansetron, sodium chloride 0.9%    Acute encephalopathy: resolved  Seizures:  -Patient initially presented to ED prior to getting dialysis due to AMS and brown emesis. Had witnessed tonic clonic seizure requiring ativan shortly after getting a CT head. He was loaded with 1500 mg Keppra, intubated for airway protection, and started on propofol for sedation. Upon physical exam, patient remained unresponsive to verbal or tactile stimuli and had upward left sided gaze with sluggish pupils. Concern for status epilepticus.    -UDS negative, alcohol level wnl  -Sepsis: Initial lactate 2.4, likely due to seizure event; repeat was 1.9. Patient received 500 ml saline in ED. Initial blood cultures, sputum culture+gram stain neg  Lumbar puncture done, CSF not convincing for meningitis, so ampicillin, vancomycin, rocephin d/c'd on 01/22. However, pt was febrile on 11/23 to 101.3. Blood cultures repeated and restarted on Vanc and Zosyn. Repeat  cultures NGTD. HSV PCR neg; Acyclovir discontinued. Pt has remained afebrile. Vanc and Zosyn discontinued 01/26.   -Intracranial: patient with history of ICH with no functional deficits. Possibly multiple foci for seizure overtime. CT without acute changes, MRI brain with chronic changes and hypertensive angiopathy; no acute changes.  - PRES: Patient off cardene drip. MRI reviewed. Will follow blood pressure as patient is normally hypotensive.   -Spot EEG without evidence of current seizure. However patient already received keppra, ativan, and sedated on propofol. 24 hour EEG with L sided structural lesion and underlying epileptiform potential. Initiated 1500 mg keppra on 01/22, per Neurology recs.  -Neurology consulted. repeat EEG on 01/27 shows diffuse slowing, though no focal activity seen on EEG.   -01/28 patient's mental status is continuing to improve day to day, now following commands. 01/30 Patient is alert and following commands. Passed SBT and extubated. Breathing well though with generalized weakness as well as weak cough.   -Mental status continues to improve very gradually daily   -continue keppra 250mg BID per neuro recs   -continue aspiration precautions, fall precautions, seizure precaution  -neuro checks  -work with OT/PT/SLP, globally weak, had prolonged hospitalization and ICU stay, possible critical illness myopathy/neuropathy present     Acute febrile episode:  Resolved  Aspiration PNA  - Tm 100.8 on 02/11/2020 with CXR concerning of aspiration. He has had significant sputum int he past 48 hrs  - since HDS, patient was started oral Augmentin renally dosed for 7 days total (2/18 - end date)  -now remains afebrile, hemodynamically stable.     Oropharyngeal dysphagia:  -likely secondary to acute encephalopathy and possible critical illness myopathy/neuropathy from prolonged hospital stay/ICU stay  -SLP consulted, recommending to keep patient NPO.  NG tube was placed during this admission and  patient was getting tube feeds via the NG tube.  -patient continue to work with SLP, however was unable to advance his diet due to overall weakness.  -IR was consulted, patient is s/p peg tube placement on 02/12/2020  -continue tube feeds at goal  -aspiration precaution  -continue to work with SLP, advance diet as tolerated.  Started on dental soft diet today.     Renovascular hypertension  -Patient on coreg at nursing home; has been compliant as given by nursing home. Patient with hypertensive emergency on admit and started Cardene drip to titrate to BP of 160-180. Off Cardene drip since 01/20.   -Goal SBP < 160 per Neurology.   -Due to soft BP, dc home carvedilol 3.125 mg BID for now  -Optimal off meds at this time, he get midodrine with piror to HD     ESRD on HD:  -Pt undergoes HD MWF outpatient  -Consult nephrology to continue HD inpatient   -Renally dose medications and avoid nephrotoxic medications to preserve residual renal function   -Strict I/O's and daily weights  -Continue to monitor renal function with daily labs      Chronic diastolic heart failure  -Last echo done at [facility] 8/2/19, EF>55%, bi-atrial enlargement  -No signs of exacerbation, appears dry  -Continue to monitor on telemetry  -Monitor intake and output and obtain daily STANDING weights  -Fluid restriction to 1.5L per day and cardiac/renal diet with salt restriction  -Supplemental O2 to keep Spo2 >90%     Severe malnutrition:  -Nutrition consulted  -Novasource @ 44 mL/hr to provide 1440 kcals, 65 g of protein, 516 mL fluid.   -Continue TFs, oral nutrition as tolerated     Hematemesis:  Resolved  -Patient with reports of coffee ground emesis prior to transport to ED. In ED, dark brown contents suctioned from stomach. Hemoglobin remains stable and pt does not have any HD instability. Patient is well known to GI. His most recent scope was in November that just showed esophagitis similar to his previous EGD.   -GI consulted initially during the  admission, No scope indicated at this time  -02/13/2020, patient noted to have several episodes of coffee-ground emesis.  Patient was transferred to the ICU for closer monitoring.  Patient underwent EGD after GI was consulted.  EGD showed normal duodenum, nonbleeding erosive gastropathy, 2 cm hiatal hernia that was biopsied, intact gastrostomy.    -continue protonix po daily  -continue to monitor H&H daily  -transfuse for hemoglobin under 7  -Nursing / Phlebotomy to use pediatric tubes when drawing labs to minimize iatrogenic anemia and blood loss.      Hx GERD with esophagitis  -Continue Protonix  -Follow up with GI out patient     DVT PPx: scds    Discharge plan and follow up:  Medically stable to DC to SNF, pending placement    Mariposa Dalton MD  Hospital Medicine Staff  [phone] pager

## 2020-02-16 NOTE — PLAN OF CARE
[facility]     Department of Hospital Medicine     [address] [place] [address]     [phone] after hours  [phone] fax       NURSING HOME ORDERS    02/16/2020    Admit to Nursing Home:  Skilled Bed                                                  Diagnoses:  Active Hospital Problems    Diagnosis  POA    *UGIB (upper gastrointestinal bleed) [K92.2]  Yes    Acute upper GI bleed [K92.2]  Yes    ESRD on dialysis [N18.6, Z99.2]  Not Applicable    Aphasia [R47.01]  Yes    Pressure injury due to medical device [T85.898A, L89.90]  Yes    Altered mental status [R41.82]  Yes    Seizure [R56.9]  Yes    Gastrointestinal hemorrhage with hematemesis [K92.0]  Yes    GERD with esophagitis [K21.0]  Yes    Severe malnutrition [E43]  Yes     Assessment and Plan  Severe Malnutrition in the context of Social/Environmental Circumstances     Related to (etiology):  Unknown etiology     Signs and Symptoms (as evidenced by):  Energy Intake: per pt, <75% intake over the last year  Body Fat Depletion: overall subcutaneous fat loss, moderate triceps fat loss and protruding patellas.  Muscle Mass Depletion:  moderate muscle wasting of interosseous, temporal, clavicle, calves and quadriceps  Weight Loss: 24% (39 lbs) x 1 year    Recommendations     Recommendation/Intervention: 1. Should pt be cleared for po diet, recommend renal diet with texture per SLP along with Novasource ONS TID. 2. Should pt require enteral feeding, initiate Novasource renal formula at 10ml/hr and advance 10ml Q4hrs to goal rate of 40ml/hr (provides 1920kcal, 87g pro, 691ml free water). Provide additional 500ml water flush/24 hrs. Hold for residuals >500ml.  Goals: 1. Pt to receive nutrition < 48 hrs.      Renovascular hypertension [I15.0]  Yes     Chronic    Chronic diastolic heart failure [I50.32]  Yes     Chronic     2-23-17    1 - Low normal to mildly depressed left ventricular systolic function  (EF 50-55%).     2 - Right ventricular enlargement with mildly depressed systolic function.     3 - Left ventricular diastolic dysfunction.     4 - Right atrial enlargement.     5 - Severe tricuspid regurgitation.     6 - Pulmonary hypertension. The estimated PA systolic pressure is 86 mmHg.     7 - Increased central venous pressure.       Encephalopathy [G93.40]  Yes    Aspiration pneumonia [J69.0]  No      Resolved Hospital Problems    Diagnosis Date Resolved POA    Acute metabolic encephalopathy [G93.41] 01/23/2020 Yes    ESRD on hemodialysis [N18.6, Z99.2] 01/31/2020 Not Applicable     Chronic     MWF at [facility]         Patient is homebound due to:  UGIB (upper gastrointestinal bleed)    Allergies:  Review of patient's allergies indicates:   Allergen Reactions    Fosrenol [lanthanum] Nausea And Vomiting     Nausea and vomiting       Vitals:  Every shift (Skilled Nursing patients)    Diet: Dental soft, thin liquids. Continue to work with SLP and advance diet as tolerated. Continue tube feeds viz PEG tube in the mean time with  Novasource @ 44 mL/hr to provide 1440 kcals, 65 g of protein, 516 mL fluid.     Acitivities: Per PT   - Up in a chair each morning as tolerated   - Ambulate with assistance to bathroom   - Scheduled walks once each shift (every 8 hours)    LABS:  Per facility protocol    Nursing Precautions:     - Aspiration precautions:             - Total assistance with meals            -  Upright 90 degrees befor during and after meals             -  Suction at bedside          - Fall precautions per nursing home protocol   - Seizure precaution per halfway protocol   - Decubitus precautions:        -  for positioning   - Pressure reducing foam mattress   - Turn patient every two hours. Use wedge pillows to anchor patient    CONSULTS:      Physical Therapy to evaluate and treat     Occupational Therapy to evaluate and treat     Speech Therapy  to evaluate and treat     Nutrition to  evaluate and recommend diet     Psychiatry to evaluate and follow patients for delirium    MISCELLANEOUS CARE:     Routine Skin for Bedridden Patients:  Apply moisture barrier cream to all    skin folds and wet areas in perineal area daily and after baths and                           all bowel movements.          PEG Care:  Clean site every 24 hours                 Medications: Discontinue all previous medication orders, if any. See new list below.     Vaughn Retana   Home Medication Instructions ANABEL:[id number]    Printed on:02/16/20 7752   Medication Information                      acetaminophen (TYLENOL) 325 MG tablet  Take 2 tablets (650 mg total) by mouth every 8 (eight) hours as needed.             amoxicillin-clavulanate 500-125mg (AUGMENTIN) 500-125 mg Tab  1 tablet (500 mg total) by Per NG tube route once daily. for 2 days             levETIRAcetam (KEPPRA) 100 mg/mL Soln  2.5 mLs (250 mg total) by Per G Tube route 2 (two) times daily.             midodrine (PROAMATINE) 5 MG Tab  Please take 30 min before dialysis on Monday Wednesday and Friday             ondansetron (ZOFRAN) 8 MG tablet  Take 1 tablet (8 mg total) by mouth every 12 (twelve) hours as needed for Nausea.             pantoprazole (PROTONIX) 40 MG tablet  Take 1 tablet (40 mg total) by mouth 2 (two) times daily.             promethazine (PHENERGAN) 25 MG tablet  Take 1 tablet (25 mg total) by mouth every 6 (six) hours as needed for Nausea.             RENAPLEX-D 800 mcg-12.5 mg -2,000 unit Tab  Take 1 tablet by mouth once daily.             sevelamer carbonate (RENVELA) 800 mg Tab  Take 1 tablet (800 mg total) by mouth 3 (three) times daily with meals.                       _________________________________  Mariposa Dalton MD  02/16/2020

## 2020-02-16 NOTE — NURSING
RN fed pt. Pt ate 100% of his dinner.   Pt complained of upset stomach and nausea. RN administered Zofran. Symptoms relieved with Zofran. Pt resting. WCTM

## 2020-02-17 NOTE — PROGRESS NOTES
Pt arrived to [facility] via stretcher.  Dialysis started via right upper arm fistula with 15 gauge needles.  Lines secured with tape.  Pt with headphones on head, no complaints.  Tolerated without difficulty

## 2020-02-17 NOTE — PT/OT/SLP RE-EVAL
Physical Therapy Re-Assessment / Treatment    Patient Name:  Vaughn Retana   MRN:  [id number]  Admit Date: 1/20/2020  Admitting Diagnosis:  UGIB (upper gastrointestinal bleed)   Length of Stay: 28 days  Recent Surgery: Procedure(s) (LRB):  EGD (ESOPHAGOGASTRODUODENOSCOPY) (N/A) 3 Days Post-Op    Hospital Course:  1/20/20: Admit date  2/1/20: PT initial evaluation  2/12/20: PEG placement  2/13/20: Patient transferred to ICU for coffee-ground emesis for closer monitoring  Please refer to PT initial evaluation for PLOF and social history.  Recommendations:     Discharge Recommendations:  nursing facility, skilled   Discharge Equipment Recommendations: wheelchair   Barriers to discharge: Decreased caregiver support    Assessment:     Vaughn Retana is a 54 y.o. male admitted with a medical diagnosis of UGIB (upper gastrointestinal bleed).  He presents with the following impairments/functional limitations: decreased functional mobility and balance. Pt requires constant tactile cues for upright static balance. Pt limited by fatigue. Vaughn Retana's deficits effect their ability to safely and independently participate in self-care tasks and functional mobility which increases their caregiver burden. Due to his physical therapy diagnosis of debility and deconditioning, they continue to benefit from acute PT services to address the following limitations: high fall risk, weakness, instability, and the need for supervised instruction of exercise. These deficits effect their roles and responsibilities in which they were able to complete prior to admit. Pt would benefit from an intensive and collaborative PT/OT/SLP therapy program to improve quality of life and focus on recovery of impairments.     Problem List: weakness, impaired self care skills, impaired balance, impaired endurance, impaired functional mobilty, gait instability, decreased lower extremity function, decreased upper extremity function, pain  Rehab  "Prognosis: Good   Plan:     During this hospitalization, patient to be seen 3 x/week to address the above listed problems via gait training, therapeutic activities, therapeutic exercises, neuromuscular re-education  · Plan of Care Expires:  02/28/20   Plan of Care Reviewed with: patient    Subjective     Communicated with RN prior to session.  Patient found supine upon PT entry to room, agreeable to evaluation. Vaughn Retana's alone present during session.    Chief Complaint: Altered Mental Status (Pt here from [place] with lethargy, pt answering all questions yes.  Nurse reports pt was seen walking yesterday. Hx of aphasia. ) and Emesis (nausea and vomiting, coffee ground emesis per report. Last dialysis Friday. )    Patient comments/goals: "I want to put my clothes on."  Pain/Comfort:  · Pain Rating 1: (pt c/o back pain but did not grade)    Objective:   Patient found with: telemetry, PEG Tube, bed alarm     General Precautions: Standard, Cardiac fall, seizure   Orthopedic Precautions:N/A   Braces: N/A   Vitals: BP (!) 166/92   Pulse 81   Temp 98.3 °F (36.8 °C) (Oral)   Resp 16   Ht 5' 6" (1.676 m)   Wt 51.8 kg (114 lb 3.2 oz)   SpO2 99%   BMI 18.43 kg/m²   Oxygen Device: Room Air    Exams:  · Cognition:   · Alert and Cooperative  · AxOx4  · Command following: Follows multistep  commands  · Fluency: clear/fluent  · Hearing: Intact  · Vision:  Intact visual fields     Left UE Left LE Right UE Right LE   Edema absent absent absent absent   ROM AROM WFL AROM WFL; LLE BKA AROM WFL AROM WFL   Modified Yomaira Scale 0: No increase in muscle tone 0: No increase in muscle tone 0: No increase in muscle tone 0: No increase in muscle tone   Strength 3/5 3/5 3/5 3/5   Sensation intact to light touch intact to light touch intact to light touch intact to light touch   Coordination normal normal normal normal       Outcome Measures:  AM-PAC 6 CLICK MOBILITY  Turning over in bed (including adjusting bedclothes, " sheets and blankets)?: 2  Sitting down on and standing up from a chair with arms (e.g., wheelchair, bedside commode, etc.): 2  Moving from lying on back to sitting on the side of the bed?: 2  Moving to and from a bed to a chair (including a wheelchair)?: 2  Need to walk in hospital room?: 1  Climbing 3-5 steps with a railing?: 1  Basic Mobility Total Score: 10     Functional Mobility:  Additional staff present: Student PT  Bed Mobility:   · Supine to Sit: moderate assistance; HOB flat and from left side of bed  · Scooting anteriorly to EOB to have both feet planted on floor: minimum assistance  · Sit to Supine: moderate assistance; HOB flat and to left side of bed    Sitting Balance at Edge of Bed:   Assistance Level Required: Moderate Assistance   Time: 8 minutes   Postural deviations noted: rounded shoulders, forward head, poor midline awareness, posterior pelvic tilt and increased cervical flexion   Facilitated: BUE weight bearing for postural control   Comments: pt with significant anterior weight shift outside of MAXINE. Pt requires total A to regain balance. Assisted pt with donning clothes while seated at EOB.    Transfers:    Sit <> Stand Transfer: maximal assistance with hand-held assist    Stand <> Sit Transfer: moderate assistance with hand-held assist   o [id number] from EOB  o Pt unable to extend hips and trunk to come to full erect stand.  o Pt's prosthesis present, however sock is not present. Pt reports his prosthesis does not fit well. Did not attempt to jaquelin prosthesis due to missing elements.      Therapeutic Activities & Exercises:   Education:  Patient provided with daily orientation and goals of this PT session. Patient agreed to participate in session. Patient aware of patient's deficits and therapy progression. They were educated to transfer to bedside chair/bedside commode/bathroom with RN/PCT present. Patient educated on Fall risk, home safety and Home exercise program by explanation.  Patient was receptive to education and needs further education. Encouraged patient to perform daily exercises & mobility to increase endurance and decrease effects of bedrest. Time provided for therapeutic counseling and discussion of health disposition. All questions answered to patient's satisfaction, within scope of PT practice; voiced no other concerns. White board updated in patient's room, RN notified of session.    Patient left supine, with head in midline, neutral pelvis & heels floated for skin protection with all lines intact, call button in reach and RN notified.    AM-PAC 6 CLICK MOBILITY  Total Score:10      GOALS:   Multidisciplinary Problems     Physical Therapy Goals        Problem: Physical Therapy Goal    Goal Priority Disciplines Outcome Goal Variances Interventions   Physical Therapy Goal     PT, PT/OT Ongoing, Progressing     Description:  Goals to be met by: 2020    Patient will increase functional independence with mobility by performin. Supine <> sit with Minimal Assistance.  2. Sit <> stand transfer with Minimal Assistance using Rolling Walker.  3. Bed <> chair transfer via Squat Pivot with Minimal Assistance using slide board.  4. Dynamic sitting at edge of bed x 8 minutes with Stand-by Assistance to prepare for functional tasks in sitting.  5. Able to tolerate exercise for 15-20 reps with independence.  6. Wheelchair propulsion x150 feet with Contact Guard Assistance using bilateral uppper extremities                      Time Tracking:     PT Received On: 20  PT Start Time: 1143     PT Stop Time: 1206  PT Total Time (min): 23 min     Billable Minutes: Re-eval 10 and Therapeutic Activity 13    Kaia Clarke PT, DPT  2020  Pager:[phone]

## 2020-02-17 NOTE — PROGRESS NOTES
[facility] NOTE     Patient currently on hemodialysis for removal of uremic toxins .     Patient seen and evaluated on hemodialysis, tolerating treatment, see HD flowsheet for vitals and assessments.      No Hypotension, chest pain, shortness of breath, cramping, nausea or vomiting.      Target UF 1 L as tolerated, keep MAP >65.    Patient stepped down out of ICU over the weekend.     FLORIDALMA Benavidez DNP, APRN, FNP-C  Department of Nephrology  [facility]  Pager: [phone]

## 2020-02-17 NOTE — PLAN OF CARE
Problem: SLP Goal  Goal: SLP Goal  Description  Speech Language Pathology  Goals expected to be met by 2/17:  1. Pt will participate in ongoing assessment of swallow to determine safest, least restrictive diet.     Outcome: Met    Pt able to tolerate regular/thin liquid diet. No further ST warranted at this time.  Halley Lundberg, TAVO

## 2020-02-17 NOTE — PROGRESS NOTES
Hospital Medicine   Progress note      [place] [facility] [place] Mariposa Dalton MD   Admit Date: 1/20/2020   Hospital Day: 28  JUAN: 2/14/2020   Code status: Full Code   Principal Problem: UGIB (upper gastrointestinal bleed)     Summary: Patient is a 54 year old male with extensive medical history including ESRD on dialysis MWF (has not received it today), L below knee amputation 2/2 osteomyelitis, dCHF (last echo 8/2/19 [facility] [place]), GERD, h/o multiple ICH w/o residual deficits, and multiple instances of GI bleed (last EGD 11/12/19, esophagitis) who presents to ED [facility] nursing home due to altered mental status. From NH report, nursing home staff went to wake the patient for his morning dialysis. He was confused, lethargic, had a moderate amount of brown colored emesis in his trash can. Patient last got dialysis on Friday. Patient is normally able to ambulate on his own and is alert and oriented; nursing staff reports that he appeared normal day before admission. Patient admitted to critical care with concerns of new onset seizures and s/p intubation for airway protection. Patient was started on vanc, rocephin, ampicillin, and acyclovir to cover for meningitis. Patient had spot EEG while on propofol in ED which did not show seizure activity. MRI done showed chronic changes but no acute abnormalities. Lumbar puncture was done after MRI. CSF labs were not convincing for bacterial or viral meningitis and antibiotics were weaned down. Continuous EEG was done after propofol was stopped which showed epileptiform discharges. Per epilepsy, patient was given another dose of Keppra. Nephrology consulted and started HD. Pt became febrile the night of 01/22; blood cultures repeated and restarted on Vanc and Zosyn. HSV PCR neg so Acyclovir discontinued.  Neurology was consulted and pt was continued on Keppra but dose was decreased. 01/27 Patient's neuro exam is somewhat improved from day prior per nurse - opening eyes  "spontaneously and occasionally tracking movements, blinking to threat. 01/28 passed SBT today, plan to extubate 01/29. Repeat 24 hr EEG shows "multifocal slowing consistent with multiple areas of focal cortical dysfunction and occasional epileptiform discharges in the left frontotemporal region consistent with a potential seizure focus in this area" with no electrographic seizures. 01/29 Originally planned for extubation, but was deferred in light of BRBPR ~08:30 per nurse. GI consulted, no scope indicated at this time. Otherwise mental status improving in small increments daily. 01/30 Patient successfully extubated, though very weak. Unable to clear secretions effectively at this time, weak cough. 2/1- lethargic, EEG was done with no seizure activity, Keppra dose was decreased.  2/2 - pt was more awake after decreasing Keppra dose further to 250mg BID. Pt able to answer simple questions appropriate and follow simple commands. SLP following but pt continues to fail swallow study. NGT placed. Pt started on tube feeds and tolerating well. He continues to be lethargic at this time and requiring TF through NGT. At this time he will require PEG as he has had NGT TF for several days, with no improvement. IR consult placed. s/p PEG 2/12. Started on tube feeds and tolerating well. 02/13/2020, patient noted to have several episodes of coffee-ground emesis.  Patient was transferred to the ICU for closer monitoring.  Patient underwent EGD after GI was consulted.  EGD showed normal duodenum, nonbleeding erosive gastropathy, 2 cm hiatal hernia that was biopsied, intact gastrostomy.  Patient was stabilized overnight and transferred back to Hospital Medicine.  H&H has now remained stable.  No further signs of bleeding.  Tube feeds running at goal.  Patient is a lot more awake, continues to participate with SLP.  Diet now advanced to dental soft.    Interval hx:   Pt was seen and examined at bedside. Pt had no acute events " overnight, and no new complaints this morning. Pt remained hemodynamically stable and afebrile.  Patient continued  oral dental soft diet.  Tube feeds currently held as patient is tolerating oral intake.  No new complaints this morning.  Patient scheduled to undergo HD this afternoon.  Patient is medically stable to be discharged back to SNF at his nursing home.    ROS (Positive in Bold, otherwise negative)  Constitutional: fever, chills, night sweats  CV: chest pain, edema, palpitations  Resp: SOB, cough, sputum production  GI: changes in appetite, NVDC, pain, melena, hematochezia, GERD, hematemesis  : Dysuria, hematuria, urinary urgency, frequency  MSK: arthralgia/myalgia, joint swelling  Neuro/Psych: anxiety, depression    PEx   Temp:  [96.5 °F (35.8 °C)-98.3 °F (36.8 °C)]   Pulse:  []   Resp:  [18]   BP: (133-174)/()   SpO2:  [96 %-100 %]      I & O (Last 24H):     Intake/Output Summary (Last 24 hours) at 2/17/2020 1004  Last data filed at 2/16/2020 1700  Gross per 24 hour   Intake 680 ml   Output 0 ml   Net 680 ml       General:  male  in no acute distress. Nontoxic. Resting in bed. Cooperative.  HEENT: NCAT. PERRL. EOMI. Sclera Anicteric.  CVS: RRR. Normal S1 S2. No murmurs  Pulm: CTAB. Normal respiratory effort. No wheezes, rhonchi, or crackles.  Abdomen: Soft. Non-distended. No tenderness to palpation. No rebound or guarding. +BS.  Peg tube in place  Extremities: No edema. No cyanosis. Full ROM.  Neuro: Alert, oriented x 3, Spont mvt of all extremities with no focal deficits noted. Globally weak    Recent Results (from the past 24 hour(s))   POCT glucose    Collection Time: 02/16/20 11:40 AM   Result Value Ref Range    POCT Glucose 201 (H) 70 - 110 mg/dL   POCT glucose    Collection Time: 02/16/20  4:21 PM   Result Value Ref Range    POCT Glucose 116 (H) 70 - 110 mg/dL   POCT glucose    Collection Time: 02/16/20 10:22 PM   Result Value Ref Range    POCT Glucose 138 (H) 70 - 110  mg/dL   Magnesium    Collection Time: 02/17/20  5:21 AM   Result Value Ref Range    Magnesium 2.3 1.6 - 2.6 mg/dL   Protime-INR    Collection Time: 02/17/20  5:21 AM   Result Value Ref Range    Prothrombin Time 11.0 9.0 - 12.5 sec    INR 1.1 0.8 - 1.2   Renal function panel    Collection Time: 02/17/20  5:21 AM   Result Value Ref Range    Glucose 94 70 - 110 mg/dL    Sodium 135 (L) 136 - 145 mmol/L    Potassium 3.8 3.5 - 5.1 mmol/L    Chloride 95 95 - 110 mmol/L    CO2 24 23 - 29 mmol/L    BUN, Bld 86 (H) 6 - 20 mg/dL    Calcium 9.7 8.7 - 10.5 mg/dL    Creatinine 10.4 (H) 0.5 - 1.4 mg/dL    Albumin 2.3 (L) 3.5 - 5.2 g/dL    Phosphorus 3.6 2.7 - 4.5 mg/dL    eGFR if African American 5.7 (A) >60 mL/min/1.73 m^2    eGFR if non African American 5.0 (A) >60 mL/min/1.73 m^2    Anion Gap 16 8 - 16 mmol/L   CBC auto differential    Collection Time: 02/17/20  5:21 AM   Result Value Ref Range    WBC 6.13 3.90 - 12.70 K/uL    RBC 3.51 (L) 4.60 - 6.20 M/uL    Hemoglobin 9.8 (L) 14.0 - 18.0 g/dL    Hematocrit 30.7 (L) 40.0 - 54.0 %    Mean Corpuscular Volume 88 82 - 98 fL    Mean Corpuscular Hemoglobin 27.9 27.0 - 31.0 pg    Mean Corpuscular Hemoglobin Conc 31.9 (L) 32.0 - 36.0 g/dL    RDW 15.6 (H) 11.5 - 14.5 %    Platelets 286 150 - 350 K/uL    MPV 10.8 9.2 - 12.9 fL    Immature Granulocytes 0.5 0.0 - 0.5 %    Gran # (ANC) 3.9 1.8 - 7.7 K/uL    Immature Grans (Abs) 0.03 0.00 - 0.04 K/uL    Lymph # 1.2 1.0 - 4.8 K/uL    Mono # 0.7 0.3 - 1.0 K/uL    Eos # 0.3 0.0 - 0.5 K/uL    Baso # 0.01 0.00 - 0.20 K/uL    nRBC 0 0 /100 WBC    Gran% 64.3 38.0 - 73.0 %    Lymph% 19.7 18.0 - 48.0 %    Mono% 10.9 4.0 - 15.0 %    Eosinophil% 4.4 0.0 - 8.0 %    Basophil% 0.2 0.0 - 1.9 %    Differential Method Automated    POCT glucose    Collection Time: 02/17/20  8:37 AM   Result Value Ref Range    POCT Glucose 125 (H) 70 - 110 mg/dL       Recent Labs   Lab 02/16/20  0434 02/16/20  0616 02/16/20  1140 02/16/20  1621 02/16/20  2222 02/17/20  0837    POCTGLUCOSE 134* 170* 201* 116* 138* 125*       Hemoglobin A1C   Date Value Ref Range Status   02/06/2020 4.7 4.0 - 5.6 % Final     Comment:     ADA Screening Guidelines:  5.7-6.4%  Consistent with prediabetes  >or=6.5%  Consistent with diabetes  High levels of fetal hemoglobin interfere with the HbA1C  assay. Heterozygous hemoglobin variants (HbS, HgC, etc)do  not significantly interfere with this assay.   However, presence of multiple variants may affect accuracy.     11/09/2019 4.0 4.0 - 5.6 % Final     Comment:     ADA Screening Guidelines:  5.7-6.4%  Consistent with prediabetes  >or=6.5%  Consistent with diabetes  High levels of fetal hemoglobin interfere with the HbA1C  assay. Heterozygous hemoglobin variants (HbS, HgC, etc)do  not significantly interfere with this assay.   However, presence of multiple variants may affect accuracy.     06/22/2019 4.7 4.0 - 5.6 % Final     Comment:     ADA Screening Guidelines:  5.7-6.4%  Consistent with prediabetes  >or=6.5%  Consistent with diabetes  High levels of fetal hemoglobin interfere with the HbA1C  assay. Heterozygous hemoglobin variants (HbS, HgC, etc)do  not significantly interfere with this assay.   However, presence of multiple variants may affect accuracy.     06/22/2019 4.7 4.0 - 5.6 % Final     Comment:     ADA Screening Guidelines:  5.7-6.4%  Consistent with prediabetes  >or=6.5%  Consistent with diabetes  High levels of fetal hemoglobin interfere with the HbA1C  assay. Heterozygous hemoglobin variants (HbS, HgC, etc)do  not significantly interfere with this assay.   However, presence of multiple variants may affect accuracy.          Active Hospital Problems    Diagnosis  POA    *UGIB (upper gastrointestinal bleed) [K92.2]  Yes    Acute upper GI bleed [K92.2]  Yes    ESRD on dialysis [N18.6, Z99.2]  Not Applicable    Aphasia [R47.01]  Yes    Pressure injury due to medical device [T85.898A, L89.90]  Yes    Altered mental status [R41.82]  Yes     Seizure [R56.9]  Yes    Gastrointestinal hemorrhage with hematemesis [K92.0]  Yes    GERD with esophagitis [K21.0]  Yes    Severe malnutrition [E43]  Yes     Assessment and Plan  Severe Malnutrition in the context of Social/Environmental Circumstances     Related to (etiology):  Unknown etiology     Signs and Symptoms (as evidenced by):  Energy Intake: per pt, <75% intake over the last year  Body Fat Depletion: overall subcutaneous fat loss, moderate triceps fat loss and protruding patellas.  Muscle Mass Depletion:  moderate muscle wasting of interosseous, temporal, clavicle, calves and quadriceps  Weight Loss: 24% (39 lbs) x 1 year    Recommendations     Recommendation/Intervention: 1. Should pt be cleared for po diet, recommend renal diet with texture per SLP along with Novasource ONS TID. 2. Should pt require enteral feeding, initiate Novasource renal formula at 10ml/hr and advance 10ml Q4hrs to goal rate of 40ml/hr (provides 1920kcal, 87g pro, 691ml free water). Provide additional 500ml water flush/24 hrs. Hold for residuals >500ml.  Goals: 1. Pt to receive nutrition < 48 hrs.      Renovascular hypertension [I15.0]  Yes     Chronic    Chronic diastolic heart failure [I50.32]  Yes     Chronic     2-23-17    1 - Low normal to mildly depressed left ventricular systolic function (EF 50-55%).     2 - Right ventricular enlargement with mildly depressed systolic function.     3 - Left ventricular diastolic dysfunction.     4 - Right atrial enlargement.     5 - Severe tricuspid regurgitation.     6 - Pulmonary hypertension. The estimated PA systolic pressure is 86 mmHg.     7 - Increased central venous pressure.       Encephalopathy [G93.40]  Yes    Aspiration pneumonia [J69.0]  No      Resolved Hospital Problems    Diagnosis Date Resolved POA    Acute metabolic encephalopathy [G93.41] 01/23/2020 Yes    ESRD on hemodialysis [N18.6, Z99.2] 01/31/2020 Not Applicable     Chronic     MWF at [facility]             Assessment and Plan for problems addressed today:      amoxicillin-clavulanate 500-125mg  1 tablet Per NG tube Daily    levetiracetam IVPB  250 mg Intravenous Q12H    lidocaine  1 patch Transdermal Q24H    midodrine  5 mg Per NG tube Every Mon, Wed, Fri    pantoprazole  40 mg Per G Tube BID     acetaminophen, albuterol-ipratropium, Dextrose 10% Bolus, Dextrose 10% Bolus, glucagon (human recombinant), insulin aspart U-100, ondansetron, sodium chloride 0.9%    Acute encephalopathy: resolved  Seizures:  -Patient initially presented to ED prior to getting dialysis due to AMS and brown emesis. Had witnessed tonic clonic seizure requiring ativan shortly after getting a CT head. He was loaded with 1500 mg Keppra, intubated for airway protection, and started on propofol for sedation. Upon physical exam, patient remained unresponsive to verbal or tactile stimuli and had upward left sided gaze with sluggish pupils. Concern for status epilepticus.    -UDS negative, alcohol level wnl  -Sepsis: Initial lactate 2.4, likely due to seizure event; repeat was 1.9. Patient received 500 ml saline in ED. Initial blood cultures, sputum culture+gram stain neg  Lumbar puncture done, CSF not convincing for meningitis, so ampicillin, vancomycin, rocephin d/c'd on 01/22. However, pt was febrile on 11/23 to 101.3. Blood cultures repeated and restarted on Vanc and Zosyn. Repeat cultures NGTD. HSV PCR neg; Acyclovir discontinued. Pt has remained afebrile. Vanc and Zosyn discontinued 01/26.   -Intracranial: patient with history of ICH with no functional deficits. Possibly multiple foci for seizure overtime. CT without acute changes, MRI brain with chronic changes and hypertensive angiopathy; no acute changes.  - PRES: Patient off cardene drip. MRI reviewed. Will follow blood pressure as patient is normally hypotensive.   -Spot EEG without evidence of current seizure. However patient already received keppra, ativan, and sedated on  propofol. 24 hour EEG with L sided structural lesion and underlying epileptiform potential. Initiated 1500 mg keppra on 01/22, per Neurology recs.  -Neurology consulted. repeat EEG on 01/27 shows diffuse slowing, though no focal activity seen on EEG.   -01/28 patient's mental status is continuing to improve day to day, now following commands. 01/30 Patient is alert and following commands. Passed SBT and extubated. Breathing well though with generalized weakness as well as weak cough.   -Mental status continues to improve very gradually daily   -continue keppra 250mg BID per neuro recs   -continue aspiration precautions, fall precautions, seizure precaution  -neuro checks  -work with OT/PT/SLP, globally weak, had prolonged hospitalization and ICU stay, possible critical illness myopathy/neuropathy present  -continue OT/PT/SLP at SNF.     Acute febrile episode:  Resolved  Aspiration PNA  -Tm 100.8 on 02/11/2020 with CXR concerning of aspiration. He has had significant sputum int he past 48 hrs  -since HDS, patient was started oral Augmentin renally dosed for 7 days total (2/18 - end date)  -now remains afebrile, hemodynamically stable.     Oropharyngeal dysphagia:  -likely secondary to acute encephalopathy and possible critical illness myopathy/neuropathy from prolonged hospital stay/ICU stay  -SLP consulted, recommending to keep patient NPO.  NG tube was placed during this admission and patient was getting tube feeds via the NG tube.  -patient continue to work with SLP, however was unable to advance his diet due to overall weakness.  -IR was consulted, patient is s/p peg tube placement on 02/12/2020  -aspiration precaution  -continue to work with SLP, advance diet as tolerated.  Started on dental soft diet, tolerating well.  -tube feeds currently held as patient has been tolerating p.o. intake     Renovascular hypertension  -Patient on coreg at nursing home; has been compliant as given by nursing home. Patient with  hypertensive emergency on admit and started Cardene drip to titrate to BP of 160-180. Off Cardene drip since 01/20.   -Goal SBP < 160 per Neurology.   -Due to soft BP, dc home carvedilol 3.125 mg BID for now  -Optimal off meds at this time, he get midodrine with piror to HD     ESRD on HD:  -Pt undergoes HD MWF outpatient  -Consult nephrology to continue HD inpatient   -Renally dose medications and avoid nephrotoxic medications to preserve residual renal function   -Strict I/O's and daily weights  -Continue to monitor renal function with daily labs      Chronic diastolic heart failure  -Last echo done at [facility] [place] 8/2/19, EF>55%, bi-atrial enlargement  -No signs of exacerbation, appears dry  -Continue to monitor on telemetry  -Monitor intake and output and obtain daily STANDING weights  -Fluid restriction to 1.5L per day and cardiac/renal diet with salt restriction  -Supplemental O2 to keep Spo2 >90%     Severe malnutrition:  -Nutrition consulted  -Novasource @ 44 mL/hr to provide 1440 kcals, 65 g of protein, 516 mL fluid.   -Continue TFs, oral nutrition as tolerated     Hematemesis:  Resolved  -Patient with reports of coffee ground emesis prior to transport to ED. In ED, dark brown contents suctioned from stomach. Hemoglobin remains stable and pt does not have any HD instability. Patient is well known to GI. His most recent scope was in November that just showed esophagitis similar to his previous EGD.   -GI consulted initially during the admission, No scope indicated at this time  -02/13/2020, patient noted to have several episodes of coffee-ground emesis.  Patient was transferred to the ICU for closer monitoring.  Patient underwent EGD after GI was consulted.  EGD showed normal duodenum, nonbleeding erosive gastropathy, 2 cm hiatal hernia that was biopsied, intact gastrostomy.    -continue protonix po daily  -continue to monitor H&H daily  -transfuse for hemoglobin under 7  -Nursing / Phlebotomy to use pediatric tubes  when drawing labs to minimize iatrogenic anemia and blood loss.      Hx GERD with esophagitis  -Continue Protonix  -Follow up with GI out patient     DVT PPx: scds    Discharge plan and follow up:  Medically stable to DC to SNF, pending placement    Mariposa Dalton MD  [facility] Medicine Staff  [phone] pager

## 2020-02-17 NOTE — PLAN OF CARE
02/17/20 1442   Discharge Reassessment   Assessment Type Discharge Planning Reassessment   Provided patient/caregiver education on the expected discharge date and the discharge plan Yes   Do you have any problems affording any of your prescribed medications? No   Discharge Plan A Skilled Nursing Facility   Discharge Plan B Return to Nursing Home   DME Needed Upon Discharge  none   Patient choice form signed by patient/caregiver N/A   Anticipated Discharge Disposition SNF   Can the patient/caregiver answer the patient profile reliably? No, cognitively impaired   How does the patient rate their overall health at the present time? Poor   Describe the patient's ability to walk at the present time. Does not walk or unable to take any steps at all   How often would a person be available to care for the patient? Whenever needed   Number of comorbid conditions (as recorded on the chart) Five or more     Having HD this late afternoon, plan to discharge to SNF at [facility] tomorrow am.    Milagro Madrid RN  Case Management  Ext: [phone]  02/17/2020  2:44 PM

## 2020-02-17 NOTE — PT/OT/SLP RE-EVAL
"Occupational Therapy   Re-evaluation    Name: Vaughn Retana  MRN: [id number]  Admitting Diagnosis:  UGIB (upper gastrointestinal bleed)     Recommendations:     Discharge Recommendations: nursing facility, skilled  Discharge Equipment Recommendations:  (TBD)  Barriers to discharge:  (increased assistance needed)    Assessment:     Vaughn Retana is a 54 y.o. male with a medical diagnosis of UGIB (upper gastrointestinal bleed).  He presents with performance deficits including weakness, impaired endurance, impaired self care skills, impaired functional mobilty, impaired balance, decreased coordination, decreased safety awareness, decreased ROM. Pt with increased participation this date as well as improved communication and alertness. Pt would continue to benefit from OT to increase functional independence and safety. Recommend SNF upon D/C.     Rehab Prognosis: Good; patient would benefit from acute skilled OT services to address these deficits and reach maximum level of function.       Plan:     Patient to be seen 3 x/week to address the above listed problems via self-care/home management, therapeutic activities, therapeutic exercises, neuromuscular re-education  · Plan of Care Expires: 03/17/20  · Plan of Care Reviewed with: patient    Subjective     Chief Complaint: Fatigue  Patient/Family stated goals: Return to PLOF  Communicated with: RN prior to session.  Pain/Comfort:  · Pain Rating 1: 0/10  · Pain Rating Post-Intervention 1: 0/10     Pt is a resident of [facility], reports living there "a while". Unsure if he was participating in therapy, but does report doing exercise there prior to admit. Pt has L LE prosthesis and reports ambulating household distances PTA.    Objective:     Communicated with: RN prior to session.  Patient found with HOB elevated with: telemetry, bed alarm, PEG Tube upon OT entry to room.  "I've never had a seizure like that before."    General Precautions: Standard, fall, seizure "   Orthopedic Precautions:N/A   Braces: N/A     Occupational Performance:    Bed Mobility:    · Patient completed Scooting with maximal assistance in supine to HOB  · Patient completed Supine to Sit with moderate assistance for trunk elevation  · Patient completed Sit to Supine with minimum assistance    Functional Mobility/Transfers:  · Not tested-- pt states part of his L LE prosthetic is not with him    Activities of Daily Living:  · Feeding:  stand by assistance sitting EOB to drink from cup  · Lower Body Dressing: total assistance to don R sock    Cognitive/Visual Perceptual:  Cognitive/Psychosocial Skills:     -       Oriented to: Person, Place and Situation, not time   -       Follows Commands/attention: Follows one-step commands, delayed responses at times  -       Communication: clear/fluent  -       Safety awareness/insight to disability: intact   Visual/Perceptual:      -Intact      Physical Exam:  Balance:    -       SBA sitting EOB ~15 minutes  Postural examination/scapula alignment:    -       Rounded shoulders  -       Forward head  Sensation:    -       Intact UEs  Upper Extremity Range of Motion:     -       Right Upper Extremity: WFL  -       Left Upper Extremity: WFL  Upper Extremity Strength:    -       Right Upper Extremity: 3+/5 throughout  -       Left Upper Extremity: 3+/5 throughout   Strength: B weak    AMPAC 6 Click:  AMPAC Total Score: 15    Treatment & Education:  Education:  OT re-eval; educated on OT role and POC; able to sit EOB ~15 minutes this date with SBA for balance, holding fluent conversation; completed UE therex including shld shrugs/rolls, scap squeezes, and push/pulls x 10 reps    Patient left HOB elevated with all lines intact, call button in reach, bed alarm on and RN notified    GOALS:   Multidisciplinary Problems     Occupational Therapy Goals        Problem: Occupational Therapy Goal    Goal Priority Disciplines Outcome Interventions   Occupational Therapy Goal      OT, PT/OT Ongoing, Progressing    Description:  Updated Goals to be met by: 1 weeks (2/24/20)     Patient will increase functional independence with ADLs by performing:    Grooming while seated with SBA.  Sitting at edge of bed x8 minutes with Supervision while completing functional task.  Supine to sit with CGA.  Sit to stand transfers with Mod A with AD as needed (LLE prosthesis present).  Increased functional strength to WFL for ADLs and mobility tasks.    Goals to be met by: 2 weeks (2/15/20)     Patient will increase functional independence with ADLs by performing:    Grooming while seated with Moderate Assistance.  Sitting at edge of bed x8 minutes with Contact Guard Assistance.  Supine to sit with Moderate Assistance. -MET  Squat pivot transfers with Maximum Assistance.   Sit to stand transfers with MOD A (LLE prosthesis present)  Increased functional strength to WFL for ADLs and mobility tasks.  Pt will follow 3/3 one-step commands to participate in ADL task.                         History:     Past Medical History:   Diagnosis Date    Amputation stump pain 9/10/2013    Aspiration pneumonia 7/27/2015    Asterixis 11/8/2016    C. difficile colitis 8/7/2015    Cholelithiasis without obstruction 8/25/2015    Chronic diastolic heart failure     2-23-17   1 - Low normal to mildly depressed left ventricular systolic function (EF 50-55%).    2 - Right ventricular enlargement with mildly depressed systolic function.    3 - Left ventricular diastolic dysfunction.    4 - Right atrial enlargement.    5 - Severe tricuspid regurgitation.    6 - Pulmonary hypertension. The estimated PA systolic pressure is 86 mmHg.    7 - Increased central venous pressure.    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 8/1/2015    Metabolic acidosis, IAG, reduced excretion of inorganic acids     Myoclonic jerking 9/20/2016    Noncompliance with medication regimen 12/4/2018    Secondary hyperparathyroidism (of renal origin)     Secondary pulmonary hypertension 3/23/2017    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    TB lung, latent 08/25/2015    Negative Quantiferon Gold 3-23-17       Past Surgical History:   Procedure Laterality Date    COLONOSCOPY      COLONOSCOPY N/A 4/4/2017    Procedure: COLONOSCOPY;  Surgeon: Walker Stern MD;  [facility]  Service: Endoscopy;  Laterality: N/A;  PA Systolic Pressure 85.56. HD Patient MWF, K+ lab prior to procedure.     ESOPHAGOGASTRODUODENOSCOPY N/A 6/12/2018    Procedure: EGD (ESOPHAGOGASTRODUODENOSCOPY);  Surgeon: Man Galicia MD;  Location: [facility] (2ND FLR);  Service: Endoscopy;  Laterality: N/A;  EGD in 8-12 weeks with Dr. Galicia on 4th floor for follow up erosive esophagitis and Mejia's surveillance.    Patient should be on Pantoprazole 40mg every 12 hours or the equivulant of another PPI    awaiting for patient to reply back regarding changing    ESOPHAGOGASTRODUODENOSCOPY N/A 3/7/2019    Procedure: EGD (ESOPHAGOGASTRODUODENOSCOPY);  Surgeon: Man Galicia MD;  Location: [facility] (2ND FLR);  Service: Endoscopy;  Laterality: N/A;    ESOPHAGOGASTRODUODENOSCOPY N/A 9/23/2019    Procedure: EGD (ESOPHAGOGASTRODUODENOSCOPY);  Surgeon: Keanu Rainey MD;  Location: [facility] (2ND FLR);  Service: Endoscopy;  Laterality: N/A;    ESOPHAGOGASTRODUODENOSCOPY N/A 10/2/2019    Procedure: EGD  (ESOPHAGOGASTRODUODENOSCOPY);  Surgeon: Kevin De La Paz MD;  Location: [facility] ENDO ([facility] FLR);  Service: Endoscopy;  Laterality: N/A;    ESOPHAGOGASTRODUODENOSCOPY N/A 11/12/2019    Procedure: EGD (ESOPHAGOGASTRODUODENOSCOPY);  Surgeon: Kevin De La Paz MD;  Location: [facility] ENDO ([facility] FLR);  Service: Endoscopy;  Laterality: N/A;    ESOPHAGOGASTRODUODENOSCOPY N/A 2/14/2020    Procedure: EGD (ESOPHAGOGASTRODUODENOSCOPY);  Surgeon: Kevin De La Paz MD;  [facility]  Service: Endoscopy;  Laterality: N/A;    FOOT AMPUTATION THROUGH METATARSAL      left foot    LEG AMPUTATION THROUGH KNEE  12/18/2013    left BKA    R AVF  9/12/12    UPPER GASTROINTESTINAL ENDOSCOPY         Time Tracking:     OT Date of Treatment: 02/17/20  OT Start Time: 1337  OT Stop Time: 1400  OT Total Time (min): 23 min    Billable Minutes:Re-eval 15  Therapeutic Activity 8    WILBUR Richardson  2/17/2020

## 2020-02-17 NOTE — ANESTHESIA POSTPROCEDURE EVALUATION
Anesthesia Post Evaluation    Patient: Vaughn Retana    Procedure(s) Performed: Procedure(s) (LRB):  EGD (ESOPHAGOGASTRODUODENOSCOPY) (N/A)    Final Anesthesia Type: general    Patient location during evaluation: PACU  Patient participation: Yes- Able to Participate  Level of consciousness: awake and alert  Post-procedure vital signs: reviewed and stable  Pain management: adequate  Airway patency: patent    PONV status at discharge: No PONV  Anesthetic complications: no      Cardiovascular status: blood pressure returned to baseline  Respiratory status: unassisted  Hydration status: euvolemic  Follow-up not needed.          Vitals Value Taken Time   /83 2/16/2020  4:24 PM   Temp 35.9 °C (96.7 °F) 2/16/2020  4:24 PM   Pulse 94 2/16/2020  4:24 PM   Resp 18 2/16/2020  4:24 PM   SpO2 98 % 2/16/2020  4:24 PM         No case tracking events are documented in the log.      Pain/Haseeb Score: No data recorded

## 2020-02-17 NOTE — PLAN OF CARE
02/17/20 1054   Post-Acute Status   Post-Acute Authorization Placement   Post-Acute Placement Status Awaiting Internal Medical Clearance       Pt to have Dialysis this afternoon.  Orders sent to [facility].  They are unable to take a patient late. Will setup D/C for tomorrow morning.  SW in contact with CM and Medical staff. Will continue to follow and offer support as needed.     Nando Gordon, NERY  [facility]   Ext. [phone]

## 2020-02-17 NOTE — PT/OT/SLP PROGRESS
"Speech Language Pathology Treatment  Discharge    Patient Name:  Vaughn Retana   MRN:  [id number]  Admitting Diagnosis: UGIB (upper gastrointestinal bleed)    Recommendations:                 General Recommendations:  Dysphagia therapy  Diet recommendations:  Regular, Liquid Diet Level: Thin   Aspiration Precautions: Feed only when awake/alert and HOB to 90 degrees   General Precautions: Standard, fall, seizure  Communication strategies:  none    Subjective     Awake/alert    Pain/Comfort:  Pain Rating 1: 0/10  Pain Rating Post-Intervention 1: 0/10    Objective:     Has the patient been evaluated by SLP for swallowing?   Yes  Keep patient NPO? No   Current Respiratory Status: room air      Pt awake upon entry. SLP trialed thin liquids x4, puree x1, and solid (isabel cracker) x3. No signs and symptoms of airway compromise noted. Pt stated "everything is going smooth" when asked about his swallow. SLP upgraded pt to regular/thin liquid diet. SLP discussed POC with pt who verbalized understanding.     Assessment:     Vaughn Retana is a 54 y.o. male with an SLP diagnosis of Dysphagia.      Goals:   Multidisciplinary Problems     SLP Goals     Not on file          Multidisciplinary Problems (Resolved)        Problem: SLP Goal    Goal Priority Disciplines Outcome   SLP Goal   (Resolved)     SLP Met   Description:  Speech Language Pathology  Goals expected to be met by 2/17:  1. Pt will participate in ongoing assessment of swallow to determine safest, least restrictive diet.                      Plan:     · Plan of Care expires:  02/29/20  · Plan of Care reviewed with:  patient   · SLP Follow-Up:  No       Discharge recommendations:  nursing facility, skilled   Barriers to Discharge:  None    Time Tracking:     SLP Treatment Date:   02/17/20  Speech Start Time:  0932  Speech Stop Time:  0940     Speech Total Time (min):  8 min    Billable Minutes: Treatment Swallowing Dysfunction 8    Halley Lundberg" SS  02/17/2020

## 2020-02-17 NOTE — PLAN OF CARE
Re-eval and POC set 2/17/20    Problem: Occupational Therapy Goal  Goal: Occupational Therapy Goal  Description  Updated Goals to be met by: 1 weeks (2/24/20)     Patient will increase functional independence with ADLs by performing:    Grooming while seated with SBA.  Sitting at edge of bed x8 minutes with Supervision while completing functional task.  Supine to sit with CGA.  Sit to stand transfers with Mod A with AD as needed (LLE prosthesis present).  Increased functional strength to WFL for ADLs and mobility tasks.    Goals to be met by: 2 weeks (2/15/20)     Patient will increase functional independence with ADLs by performing:    Grooming while seated with Moderate Assistance.  Sitting at edge of bed x8 minutes with Contact Guard Assistance.  Supine to sit with Moderate Assistance. -MET  Squat pivot transfers with Maximum Assistance.   Sit to stand transfers with MOD A (LLE prosthesis present)  Increased functional strength to WFL for ADLs and mobility tasks.  Pt will follow 3/3 one-step commands to participate in ADL task.        2/17/2020 1429 by WILBUR Richardson  Outcome: Ongoing, Progressing  2/17/2020 1428 by WILBUR Richardson  Reactivated

## 2020-02-17 NOTE — PLAN OF CARE
Problem: Adult Inpatient Plan of Care  Goal: Plan of Care Review  Outcome: Ongoing, Not Progressing     POC reviewed with pt. Pt verbalized understanding. VSS. PT AAOx4. No c/o pain. PT complained of N/V after eating dinner. Zofran administered. PEG in place. Tube feeding. Pt is on Dysphagia Soft diet, thin liquid. No straws. Suction at bedside, pt instructed to use suction when needed.  BG checks changed to q4  hours. All safety/fall/ seizure and aspiration measures maintained through the shift. Will continue to monitor.

## 2020-02-17 NOTE — PLAN OF CARE
Plan of Care:  Discharge Recommendation: SNF.  Please continue Progressive Mobility Protocol as appropriate.  Appropriate transfer level with nursing staff: Bed <> Chair:  Squat Pivot with moderate assistance with slide board.    Kaia Clarke PT, DPT  2020  Pager: [phone]    Physical Therapy Treatment Goals:  Multidisciplinary Problems       Physical Therapy Goals          Problem: Physical Therapy Goal    Goal Priority Disciplines Outcome Goal Variances Interventions   Physical Therapy Goal     PT, PT/OT Ongoing, Progressing     Description:  Goals to be met by: 2020    Patient will increase functional independence with mobility by performin. Supine <> sit with Minimal Assistance.  2. Sit <> stand transfer with Minimal Assistance using Rolling Walker.  3. Bed <> chair transfer via Squat Pivot with Minimal Assistance using slide board.  4. Dynamic sitting at edge of bed x 8 minutes with Stand-by Assistance to prepare for functional tasks in sitting.  5. Able to tolerate exercise for 15-20 reps with independence.  6. Wheelchair propulsion x150 feet with Contact Guard Assistance using bilateral uppper extremities

## 2020-02-17 NOTE — PLAN OF CARE
Problem: Adult Inpatient Plan of Care  Goal: Plan of Care Review  Outcome: Ongoing, Progressing     Pt AAOx4. Moves all extremities well. Left below knee amputation. No c/o pain, N/V, or numbness/tingling in extremities. PEG in place. SLP evaluated pt during day shift 2/16. Diet was advanced to dysphagia soft with thin liquids-no straws. Pt ate 100% of his dinner, 100% of dessert, and 1 cup of applesauce. Tolerated well. VS stable. Suction at bedside. BG checks 4x daily. Pt had one small, brown bowel movement. Resting well through the night. All safety measures maintained through the shift. Will continue to monitor.

## 2020-02-18 NOTE — PLAN OF CARE
LTGs remain appropriate. Pt will continue PT POC.  Mery Thomas, PT  2020    Problem: Physical Therapy Goal  Goal: Physical Therapy Goal  Description  Goals to be met by: 2020    Patient will increase functional independence with mobility by performin. Supine <> sit with Minimal Assistance.- Met.td  2. Sit <> stand transfer with Minimal Assistance using Rolling Walker.  3. Bed <> chair transfer via Squat Pivot with Minimal Assistance using slide board.  4. Dynamic sitting at edge of bed x 8 minutes with Stand-by Assistance to prepare for functional tasks in sitting.  5. Able to tolerate exercise for 15-20 reps with independence.  6. Wheelchair propulsion x150 feet with Contact Guard Assistance using bilateral uppper extremities        Outcome: Ongoing, Progressing

## 2020-02-18 NOTE — PLAN OF CARE
02/18/20 1406   Final Note   Assessment Type Final Discharge Note   Anticipated Discharge Disposition SNF   Hospital Follow Up  Appt(s) scheduled? No  (clinic or facility will schedule)   Right Care Referral Info   Post Acute Recommendation SNF / Sub-Acute Rehab   Facility Name [facility]     Patient medically ready for discharge to [facility].     No future appointments.    Milagro Madrid RN  Case Management  Ext: [phone]  02/18/2020  2:06 PM

## 2020-02-18 NOTE — DISCHARGE SUMMARY
Discharge Summary  Hospital Medicine       Name: Vaughn Retana  YOB: 1965 (Age: 54 y.o.)  Date of Admission: 1/20/2020  Date of Discharge: 2/18/2020  Attending Provider on Discharge: Mariposa Dalton MD  [facility] Medicine Team: [facility]OS[facility] MED G  Code status: Full Code    Primary Care Provider: Cori Randall MD    Discharge Diagnosis:  Active Hospital Problems    Diagnosis  POA    *UGIB (upper gastrointestinal bleed) [K92.2]  Yes    Acute upper GI bleed [K92.2]  Yes    ESRD on dialysis [N18.6, Z99.2]  Not Applicable    Aphasia [R47.01]  Yes    Pressure injury due to medical device [T85.898A, L89.90]  Yes    Altered mental status [R41.82]  Yes    Seizure [R56.9]  Yes    Gastrointestinal hemorrhage with hematemesis [K92.0]  Yes    GERD with esophagitis [K21.0]  Yes    Severe malnutrition [E43]  Yes     Assessment and Plan  Severe Malnutrition in the context of Social/Environmental Circumstances     Related to (etiology):  Unknown etiology     Signs and Symptoms (as evidenced by):  Energy Intake: per pt, <75% intake over the last year  Body Fat Depletion: overall subcutaneous fat loss, moderate triceps fat loss and protruding patellas.  Muscle Mass Depletion:  moderate muscle wasting of interosseous, temporal, clavicle, calves and quadriceps  Weight Loss: 24% (39 lbs) x 1 year    Recommendations     Recommendation/Intervention: 1. Should pt be cleared for po diet, recommend renal diet with texture per SLP along with Novasource ONS TID. 2. Should pt require enteral feeding, initiate Novasource renal formula at 10ml/hr and advance 10ml Q4hrs to goal rate of 40ml/hr (provides 1920kcal, 87g pro, 691ml free water). Provide additional 500ml water flush/24 hrs. Hold for residuals >500ml.  Goals: 1. Pt to receive nutrition < 48 hrs.      Renovascular hypertension [I15.0]  Yes     Chronic    Chronic diastolic heart failure [I50.32]  Yes     Chronic     2-23-17    1 - Low normal to mildly depressed  left ventricular systolic function (EF 50-55%).     2 - Right ventricular enlargement with mildly depressed systolic function.     3 - Left ventricular diastolic dysfunction.     4 - Right atrial enlargement.     5 - Severe tricuspid regurgitation.     6 - Pulmonary hypertension. The estimated PA systolic pressure is 86 mmHg.     7 - Increased central venous pressure.       Encephalopathy [G93.40]  Yes    Aspiration pneumonia [J69.0]  No      Resolved Hospital Problems    Diagnosis Date Resolved POA    Acute metabolic encephalopathy [G93.41] 01/23/2020 Yes    ESRD on hemodialysis [N18.6, Z99.2] 01/31/2020 Not Applicable     Chronic     MWF at [facility]         HPI: Patient is a 54 year old male with extensive medical history including ESRD on dialysis MWF (has not received it today), L below knee amputation 2/2 osteomyelitis, dCHF (last echo 8/2/19 [facility]), GERD, h/o multiple ICH w/o residual deficits, and multiple instances of GI bleed (last EGD 11/12/19, esophagitis) who presents to ED [facility] nursing home due to altered mental status. From NH report, nursing home staff went to wake the patient for his morning dialysis. He was confused, lethargic, had a moderate amount of brown colored emesis in his trash can. Patient last got dialysis on Friday. Patient is normally able to ambulate on his own and is alert and oriented; nursing staff reports that he appeared normal yesterday.  At the time of ICU consult, initial work up showed largely unremarkable cbc with no leukocytosis. CMP significant for a bicarb of 36; patient is ESRD with creatinine of 10.8. Patient still makes some urine, and UA was without evidence for UTI. Lactic acid acid was mildly elevated at 2.4 and troponin mildly elevated at .348->.339. INR is at 1.2. Alcohol and drug screen negative. Patient was given versed prior to undergoing CT scan; which showed no acute intraparenchymal hemorrhage or major vascular distribution, senescent changes,  "and remote lacunar type basal ganglia infarct. Shortly after CT, patient had a witnessed tonic clonic seizure. He received 2 mg of ativan and 1500 of keppra. Patient became non-responsive afterwards and was severely hypertensive with systolic bp in the 200's. Patient was intubated for airway protection and started on propofol and Cardene drip for hypertension. Family updated over the phone and at bedside.        Summary: Patient admitted to critical care with concerns of new onset seizures and s/p intubation for airway protection. Patient was started on vanc, rocephin, ampicillin, and acyclovir to cover for meningitis. Patient had spot EEG while on propofol in ED which did not show seizure activity. MRI done showed chronic changes but no acute abnormalities. Lumbar puncture was done after MRI. CSF labs were not convincing for bacterial or viral meningitis and antibiotics were weaned down. Continuous EEG was done after propofol was stopped which showed epileptiform discharges. Per epilepsy, patient was given another dose of Keppra. Nephrology consulted and started HD. Pt became febrile the night of 01/22; blood cultures repeated and restarted on Vanc and Zosyn. HSV PCR neg so Acyclovir discontinued.  Neurology was consulted and pt was continued on Keppra but dose was decreased. 01/27 Patient's neuro exam is somewhat improved from day prior per nurse - opening eyes spontaneously and occasionally tracking movements, blinking to threat. 01/28 passed SBT today, plan to extubate 01/29. Repeat 24 hr EEG shows "multifocal slowing consistent with multiple areas of focal cortical dysfunction and occasional epileptiform discharges in the left frontotemporal region consistent with a potential seizure focus in this area" with no electrographic seizures. 01/29 Originally planned for extubation, but was deferred in light of BRBPR ~08:30 per nurse. GI consulted, no scope indicated at this time. Otherwise mental status improving in " small increments daily. 01/30 Patient successfully extubated, though very weak. Unable to clear secretions effectively at this time, weak cough. 2/1- lethargic, EEG was done with no seizure activity, Keppra dose was decreased.  2/2 - pt was more awake after decreasing Keppra dose further to 250mg BID. Pt able to answer simple questions appropriate and follow simple commands. SLP following but pt continues to fail swallow study. NGT placed. Pt started on tube feeds and tolerating well. He continues to be lethargic at this time and requiring TF through NGT. At this time he will require PEG as he has had NGT TF for several days, with no improvement. IR consult placed. s/p PEG 2/12. Started on tube feeds and tolerating well. 02/13/2020, patient noted to have several episodes of coffee-ground emesis.  Patient was transferred to the ICU for closer monitoring.  Patient underwent EGD after GI was consulted.  EGD showed normal duodenum, nonbleeding erosive gastropathy, 2 cm hiatal hernia that was biopsied, intact gastrostomy.  Patient was stabilized overnight and transferred back to Hospital Medicine.  H&H has now remained stable.  No further signs of bleeding.  Tube feeds running at goal.  Patient is a lot more awake, continues to participate with SLP.  Diet now advanced to dental soft.    Hospital Course:   Acute encephalopathy: resolved  Seizures:  -Patient initially presented to ED prior to getting dialysis due to AMS and brown emesis. Had witnessed tonic clonic seizure requiring ativan shortly after getting a CT head. He was loaded with 1500 mg Keppra, intubated for airway protection, and started on propofol for sedation. Upon physical exam, patient remained unresponsive to verbal or tactile stimuli and had upward left sided gaze with sluggish pupils. Concern for status epilepticus.    -UDS negative, alcohol level wnl  -Sepsis: Initial lactate 2.4, likely due to seizure event; repeat was 1.9. Patient received 500 ml  saline in ED. Initial blood cultures, sputum culture+gram stain neg  Lumbar puncture done, CSF not convincing for meningitis, so ampicillin, vancomycin, rocephin d/c'd on 01/22. However, pt was febrile on 11/23 to 101.3. Blood cultures repeated and restarted on Vanc and Zosyn. Repeat cultures NGTD. HSV PCR neg; Acyclovir discontinued. Pt has remained afebrile. Vanc and Zosyn discontinued 01/26.   -Intracranial: patient with history of ICH with no functional deficits. Possibly multiple foci for seizure overtime. CT without acute changes, MRI brain with chronic changes and hypertensive angiopathy; no acute changes.  - PRES: Patient off cardene drip. MRI reviewed. Will follow blood pressure as patient is normally hypotensive.   -Spot EEG without evidence of current seizure. However patient already received keppra, ativan, and sedated on propofol. 24 hour EEG with L sided structural lesion and underlying epileptiform potential. Initiated 1500 mg keppra on 01/22, per Neurology recs.  -Neurology consulted. repeat EEG on 01/27 shows diffuse slowing, though no focal activity seen on EEG.   -01/28 patient's mental status is continuing to improve day to day, now following commands. 01/30 Patient is alert and following commands. Passed SBT and extubated. Breathing well though with generalized weakness as well as weak cough.   -Mental status continues to improve daily.  Patient now back at baseline.    -continue keppra 250mg BID per neuro recs   -continue aspiration precautions, fall precautions, seizure precaution  -continue OT/PT/SLP at SNF.     Acute febrile episode:  Resolved  Aspiration PNA  -Tm 100.8 on 02/11/2020 with CXR concerning of aspiration. He has had significant sputum int he past 48 hrs  -since HDS, patient was started oral Augmentin renally dosed for 7 days total (2/19 - end date)  -now remains afebrile, hemodynamically stable.     Oropharyngeal dysphagia:  -likely secondary to acute encephalopathy and possible  critical illness myopathy/neuropathy from prolonged hospital stay/ICU stay  -SLP consulted, recommending to keep patient NPO.  NG tube was placed during this admission and patient was getting tube feeds via the NG tube.  -patient continue to work with SLP, however was unable to advance his diet due to overall weakness.  -IR was consulted, patient is s/p peg tube placement on 02/12/2020  -continue aspiration precaution  -continue to work with SLP, advance diet as tolerated.  Started on dental soft diet, tolerating well.  Can also wean off tube feeds as oral intake increases.     Renovascular hypertension  -Patient on coreg at nursing home; has been compliant as given by nursing home. Patient with hypertensive emergency on admit and started Cardene drip to titrate to BP of 160-180. Off Cardene drip since 01/20.   -Goal SBP < 160 per Neurology.   -Due to soft BP, dc home carvedilol 3.125 mg BID for now  -Optimal off meds at this time, he get midodrine with piror to HD     ESRD on HD:  -Pt undergoes HD MWF outpatient  -last HD session on Monday 02/17/2020  -Consulted nephrology to continue HD inpatient   -Renally dose medications and avoid nephrotoxic medications to preserve residual renal function   -Continue to monitor renal function and outpatient setting      Chronic diastolic heart failure  -Last echo done at [facility] 8/2/19, EF>55%, bi-atrial enlargement  -No signs of exacerbation, appears dry  -Monitor intake and output and obtain daily weights  -Fluid restriction to 1.5L per day and cardiac/renal diet with salt restriction\     Severe malnutrition:  -Nutrition consulted  -Novasource @ 44 mL/hr to provide 1440 kcals, 65 g of protein, 516 mL fluid.   -Continue TFs, oral nutrition as tolerated (see above)     Hematemesis:  Resolved  -Patient with reports of coffee ground emesis prior to transport to ED. In ED, dark brown contents suctioned from stomach. Hemoglobin remains stable and pt does not have any HD instability.  Patient is well known to GI. His most recent scope was in November that just showed esophagitis similar to his previous EGD.   -GI consulted initially during the admission, No scope indicated at this time  -02/13/2020, patient noted to have several episodes of coffee-ground emesis.  Patient was transferred to the ICU for closer monitoring.  Patient underwent EGD after GI was consulted.  EGD showed normal duodenum, nonbleeding erosive gastropathy, 2 cm hiatal hernia that was biopsied, intact gastrostomy.    -continue protonix 40 mg b.i.d. daily  -continue to monitor H&H daily  -follow-up with GI outpatient     Hx GERD with esophagitis  -Continue Protonix  -Follow up with GI out patient     Labs:  Recent Labs   Lab 02/16/20  0506 02/17/20  0521 02/18/20  0738   WBC 5.88 6.13 4.59   HGB 11.1* 9.8* 9.6*   HCT 35.2* 30.7* 30.8*    286 262     Recent Labs   Lab 02/16/20  0506 02/17/20  0521 02/18/20  0738    135* 137   K 3.7 3.8 3.9   CL 96 95 102   CO2 25 24 24   BUN 75* 86* 30*   CREATININE 9.3* 10.4* 5.6*   * 94 81   CALCIUM 9.8 9.7 9.6   MG 2.5 2.3 2.1   PHOS 4.7* 3.6 3.4     Recent Labs   Lab 02/16/20  0506 02/17/20  0521 02/18/20  0738   ALBUMIN 2.4* 2.3* 2.3*   INR 1.1 1.1 1.1      Recent Labs   Lab 02/16/20  1621 02/16/20  2222 02/17/20  0837 02/17/20  1809 02/18/20  0003 02/18/20  0549   POCTGLUCOSE 116* 138* 125* 93 149* 87     No results for input(s): CPK, CPKMB, MB, TROPONINI in the last 72 hours.    ROS (Positive in Bold, otherwise negative)  Constitutional: fever, chills, night sweats  CV: chest pain, edema, palpitations  Resp: SOB, cough, sputum production  GI: changes in appetite, NVDC, pain, melena, hematochezia, GERD, hematemesis  : Dysuria, hematuria, urinary urgency, frequency  MSK: arthralgia/myalgia, joint swelling  Neuro/Psych: anxiety, depression    PEx   Temp:  [97.1 °F (36.2 °C)-98.7 °F (37.1 °C)]   Pulse:  [79-93]   Resp:  [16-20]   BP: (134-183)/()   SpO2:  [95 %-100  %]      General: no distress   Lungs: clear to ausculation anteriorly and posteriorly   Heart: regular rate and rhythm   Abdomen: normal bowel sounds, soft, no tenderness   Extremities: no edema. No clubbing or cyanosis       Procedures:  EGD, CXR, peg tube placement, CT head, MRI brain, EEG    Consultants:  Critical Care, Gastroenterology, Nephrology, OT/PT, SLP, neurology, epilepsy    Current Discharge Medication List      START taking these medications    Details   amoxicillin-clavulanate 500-125mg (AUGMENTIN) 500-125 mg Tab 1 tablet (500 mg total) by Per NG tube route once daily. for 2 days  Qty: 2 tablet, Refills: 0      levETIRAcetam (KEPPRA) 100 mg/mL Soln 2.5 mLs (250 mg total) by Per G Tube route 2 (two) times daily.  Qty: 150 mL, Refills: 11         CONTINUE these medications which have NOT CHANGED    Details   acetaminophen (TYLENOL) 325 MG tablet Take 2 tablets (650 mg total) by mouth every 8 (eight) hours as needed.  Refills: 0      midodrine (PROAMATINE) 5 MG Tab Please take 30 min before dialysis on Monday Wednesday and Friday  Qty: 60 tablet, Refills: 3      ondansetron (ZOFRAN) 8 MG tablet Take 1 tablet (8 mg total) by mouth every 12 (twelve) hours as needed for Nausea.  Qty: 60 tablet, Refills: 1    Associated Diagnoses: Dyslipidemia; Renovascular hypertension; History of spontaneous intraparenchymal intracranial hemorrhage associated with hypertension      pantoprazole (PROTONIX) 40 MG tablet Take 1 tablet (40 mg total) by mouth 2 (two) times daily.  Qty: 60 tablet, Refills: 11    Associated Diagnoses: Esophagitis, [place] grade D      promethazine (PHENERGAN) 25 MG tablet Take 1 tablet (25 mg total) by mouth every 6 (six) hours as needed for Nausea.  Qty: 15 tablet, Refills: 0      RENAPLEX-D 800 mcg-12.5 mg -2,000 unit Tab Take 1 tablet by mouth once daily.  Refills: 3      sevelamer carbonate (RENVELA) 800 mg Tab Take 1 tablet (800 mg total) by mouth 3 (three) times daily with meals.  Qty:  90 tablet, Refills: 3         STOP taking these medications       carvedilol (COREG) 3.125 MG tablet Comments:   Reason for Stopping:         metoclopramide HCl (REGLAN) 10 MG tablet Comments:   Reason for Stopping:         sucralfate (CARAFATE) 100 mg/mL suspension Comments:   Reason for Stopping:               The relevant and important risks, side effects, and benefits of their medications were reviewed with patient during hospitalization and at discharge. The patient was given the opportunity to discuss and ask questions about their medications, including target symptoms, potential risks, side effects and benefits of their medications, as well as their expected prognosis if non-medication treatment options were chosen.  The patient expresses understanding of all these options and information and voluntarily consents to treatment.    Discharge Diet:  Tube feeds with Novasource @ 44 mL/hr to provide 1440 kcals, 65 g of protein, 516 mL fluid.  Patient continues to work with SLP, oral diet advanced to dental soft with Normal Fluid intake of 1500 - 2000 mL per day.  Continue to wean off tube feeds as patient's oral intake increases to goal.    Activity: activity as tolerated    Discharge Condition: Fair    Disposition: Skilled Nursing Facility    Tests pending at the time of discharge: none      Time spent  on the discharge of the patient including review of hospital course with the patient. reviewing discharge medications and arranging follow-up care: 40 mins    Discharge examination Patient was seen and examined on the date of discharge and determined to be suitable for discharge.    Discharge plan and follow up:  It is critical that you make your follow-up appointment(s). If you are discharged on the weekend or after business hours, or if we are unable to schedule these appointments for you for any reason, you or a family member need to call during the next business day to schedule your appointment(s).    -Follow  up with you PCP, Cori Randall MD within 1-2 weeks as arranged with SW and treatment team prior to discharge  -Take all medications as prescribed and listed above.  -Recommended Follow-up Tests:  Follow-up CBC, CMP periodically      - Please return to ED or call your physician if you have:         1. Fevers > 101.5 unresponsive to tylenol.       2. Abdominal pain and/or distention       3. Intractable nausea, vomiting or diarrhea       4. Inability to tolerate adequate oral intake of food       5. Neurologic changes, chest pain or shortness of breath       Follow-up with Neurology, Gastroenterology, PCP    Mariposa Dalton MD  Hospital Medicine Staff  [phone] pager

## 2020-02-18 NOTE — NURSING
Patient refusing tube feeds as pt able to eat meals. MD made aware. Patient transported to dialysis for 1515hr.

## 2020-02-18 NOTE — PROGRESS NOTES
Pt completed 3 hours dialysis via right arm access.  Net removal 1 liter, pt tolerated without difficulty.  Needles removed and pressure held for 10 minutes, hemostasis achieved.  Dressing applied and secured with tape.  Report called to Maria Antonia, pt returned to room by hospital escort.

## 2020-02-18 NOTE — PT/OT/SLP PROGRESS
"Physical Therapy Treatment    Patient Name:  Vaughn Retana   MRN:  [id number]    Recommendations:     Discharge Recommendations:  nursing facility, skilled    Discharge Equipment Recommendations: wheelchair   Barriers to discharge: None    Assessment:     Vaughn Retana is a 54 y.o. male admitted with a medical diagnosis of UGIB (upper gastrointestinal bleed).  He presents with the following impairments/functional limitations:  weakness, impaired endurance, impaired self care skills, impaired functional mobilty, gait instability, impaired balance, impaired cognition, decreased coordination, decreased upper extremity function, decreased lower extremity function, decreased safety awareness, decreased ROM .      Pt dario session well w/ good participation. He is showing some progress as he was able to meet bed mobility goals. He does however remain limited by the above listed deficits and due him not having a piece of his prosthetic is unable to gait train at this time. Pt will continue PT POC.    Rehab Prognosis: Good; patient would benefit from acute skilled PT services to address these deficits and reach maximum level of function.    Recent Surgery: Procedure(s) (LRB):  EGD (ESOPHAGOGASTRODUODENOSCOPY) (N/A) 4 Days Post-Op    Plan:     During this hospitalization, patient to be seen 3 x/week to address the identified rehab impairments via gait training, therapeutic activities, therapeutic exercises, neuromuscular re-education, wheelchair management/training and progress toward the following goals:    · Plan of Care Expires:  02/28/20    Subjective     Chief Complaint: "This isn't me." (referring to weakness/instability in sitting)  Patient/Family Comments/goals: to get stronger  Pain/Comfort:  · Pain Rating 1: 0/10  · Pain Rating Post-Intervention 1: 0/10      Objective:     Communicated with nursing prior to session.  Patient found supine with telemetry, PEG Tube, bed alarm upon PT entry to room.     General " Precautions: Standard, fall, seizure   Orthopedic Precautions:N/A   Braces: N/A     Functional Mobility:  · Bed Mobility:   · Roll Left/Right in bed w/ SBA using side rails  · Scooting in supine to HOB w/ use of side rails, Angelique for PT to stabilize RLE  · Supine<>sit on bed w/ SBA; HOB elevated and w/ side rails  · inc'd time and cues required for technique  · Transfers:  · Not attempted due to pt missing a piece to his prosthetic  · Gait:   · Not attempted due to pt missing a piece to his prosthetic  · Balance:   · Sitting balance EOB ~15min total w/ varying assistance from Angelique-close SBA  · Posterior LOB/lean at times, inc'd w/ duration at EOB      AM-PAC 6 CLICK MOBILITY  Turning over in bed (including adjusting bedclothes, sheets and blankets)?: 3  Sitting down on and standing up from a chair with arms (e.g., wheelchair, bedside commode, etc.): 2  Moving from lying on back to sitting on the side of the bed?: 3  Moving to and from a bed to a chair (including a wheelchair)?: 2  Need to walk in hospital room?: 1  Climbing 3-5 steps with a railing?: 1  Basic Mobility Total Score: 12       Therapeutic Activities and Exercises:  Seated hand taps in various directions/heights w/ BUE, 10reps each, SBA  Seated WB to elbow w/ tricep push up to midline, x5 reps each BUE, Mod/Angelique to return to midline  Seated BLE therex x10 reps (HF, LAQ w/ AAROM for full knee extension, and RLE APs)  -cues for form w/ all exercises/activities    Patient left supine with all lines intact, call button in reach and nurse notified. Bed alarm on.    GOALS:   Multidisciplinary Problems     Physical Therapy Goals        Problem: Physical Therapy Goal    Goal Priority Disciplines Outcome Goal Variances Interventions   Physical Therapy Goal     PT, PT/OT Ongoing, Progressing     Description:  Goals to be met by: 2020    Patient will increase functional independence with mobility by performin. Supine <> sit with Minimal Assistance.-  Met.td  2. Sit <> stand transfer with Minimal Assistance using Rolling Walker.  3. Bed <> chair transfer via Squat Pivot with Minimal Assistance using slide board.  4. Dynamic sitting at edge of bed x 8 minutes with Stand-by Assistance to prepare for functional tasks in sitting.  5. Able to tolerate exercise for 15-20 reps with independence.  6. Wheelchair propulsion x150 feet with Contact Guard Assistance using bilateral uppper extremities                         Time Tracking:     PT Received On: 02/18/20  PT Start Time: 0836     PT Stop Time: 0900  PT Total Time (min): 24 min     Billable Minutes: Therapeutic Activity 15, Therapeutic Exercise 9 and Total Time 24    Treatment Type: Treatment  PT/PTA: PT     PTA Visit Number: 0     Mery Thomas, LUIS  02/18/2020

## 2020-02-18 NOTE — PLAN OF CARE
02/18/20 1239   Post-Acute Status   Post-Acute Authorization Placement   Post-Acute Placement Status Set-up Complete       Pt has been accepted by [facility]. Nurse can call report to [phone]. And ask to speak to nurse for room 206A. Transport setup by EMS for 2pm.  SW in contact with CM and Medical staff.       Nando Gordon LMSW  [facility]   Ext. [phone]

## 2020-02-18 NOTE — NURSING
Patient discharged at 1400hr to NH with [facility]. Report given to NH facility. All questions answered. No issues noted.

## 2020-02-18 NOTE — PLAN OF CARE
[facility]                                      Department of Hospital Medicine                                      [address] [place] [address]                                      [phone] after hours  [phone] fax                                         NURSING HOME ORDERS     02/16/2020     Admit to Nursing Home:  Skilled Bed                                                  Diagnoses:         Active Hospital Problems     Diagnosis   POA    *UGIB (upper gastrointestinal bleed) [K92.2]   Yes    Acute upper GI bleed [K92.2]   Yes    ESRD on dialysis [N18.6, Z99.2]   Not Applicable    Aphasia [R47.01]   Yes    Pressure injury due to medical device [T85.898A, L89.90]   Yes    Altered mental status [R41.82]   Yes    Seizure [R56.9]   Yes    Gastrointestinal hemorrhage with hematemesis [K92.0]   Yes    GERD with esophagitis [K21.0]   Yes    Severe malnutrition [E43]   Yes       Assessment and Plan  Severe Malnutrition in the context of Social/Environmental Circumstances     Related to (etiology):  Unknown etiology     Signs and Symptoms (as evidenced by):  Energy Intake: per pt, <75% intake over the last year  Body Fat Depletion: overall subcutaneous fat loss, moderate triceps fat loss and protruding patellas.  Muscle Mass Depletion:  moderate muscle wasting of interosseous, temporal, clavicle, calves and quadriceps  Weight Loss: 24% (39 lbs) x 1 year     Recommendations     Recommendation/Intervention: 1. Should pt be cleared for po diet, recommend renal diet with texture per SLP along with Novasource ONS TID. 2. Should pt require enteral feeding, initiate Novasource renal formula at 10ml/hr and advance 10ml Q4hrs to goal rate of 40ml/hr (provides 1920kcal, 87g pro, 691ml free water). Provide additional 500ml water flush/24 hrs. Hold for residuals >500ml.  Goals: 1. Pt to receive nutrition < 48 hrs.        Renovascular hypertension [I15.0]   Yes       Chronic    Chronic diastolic heart failure [I50.32]   Yes       Chronic       2-23-17    1 - Low normal to mildly depressed left ventricular systolic function (EF 50-55%).     2 - Right ventricular enlargement with mildly depressed systolic function.     3 - Left ventricular diastolic dysfunction.     4 - Right atrial enlargement.     5 - Severe tricuspid regurgitation.     6 - Pulmonary hypertension. The estimated PA systolic pressure is 86 mmHg.     7 - Increased central venous pressure.        Encephalopathy [G93.40]   Yes    Aspiration pneumonia [J69.0]   No       Resolved Hospital Problems     Diagnosis Date Resolved POA    Acute metabolic encephalopathy [G93.41] 01/23/2020 Yes    ESRD on hemodialysis [N18.6, Z99.2] 01/31/2020 Not Applicable       Chronic       MWF at [facility]            Patient is homebound due to:  UGIB (upper gastrointestinal bleed)     Allergies:        Review of patient's allergies indicates:   Allergen Reactions    Fosrenol [lanthanum] Nausea And Vomiting       Nausea and vomiting         Vitals:  Every shift (Skilled Nursing patients)     Diet: Dental soft, thin liquids. Continue to work with SLP and advance diet as tolerated. Continue tube feeds via PEG tube in the mean time with  Novasource @ 44 mL/hr to provide 1440 kcals, 65 g of protein, 516 mL fluid. Normal Fluid intake of 1500 - 2000 mL per day.  Continue to wean off tube feeds as patient's oral intake increases to goal.     Acitivities: Per PT   - Up in a chair each morning as tolerated   - Ambulate with assistance to bathroom   - Scheduled walks once each shift (every 8 hours)     LABS:  Per facility protocol     Nursing Precautions:     - Aspiration precautions:               - Total assistance with meals              -  Upright 90 degrees befor during and after meals               -  Suction at bedside          - Fall precautions per nursing home protocol   - Seizure  precaution per nursing home protocol   - Decubitus precautions:              -  for positioning              - Pressure reducing foam mattress              - Turn patient every two hours. Use wedge pillows to anchor patient     CONSULTS:                 Physical Therapy to evaluate and treat                 Occupational Therapy to evaluate and treat                 Speech Therapy  to evaluate and treat                 Nutrition to evaluate and recommend diet                 Psychiatry to evaluate and follow patients for delirium     MISCELLANEOUS CARE:                Routine Skin for Bedridden Patients:  Apply moisture barrier cream to all                          skin folds and wet areas in perineal area daily and after baths and                           all bowel movements.                                             PEG Care:  Clean site every 24 hours                                                                                                               Medications: Discontinue all previous medication orders, if any. See new list below.     Vaughn Retana   Home Medication Instructions ANABEL:[id number]    Printed on:02/18/20 1204   Medication Information                      acetaminophen (TYLENOL) 325 MG tablet  Take 2 tablets (650 mg total) by mouth every 8 (eight) hours as needed.             amoxicillin-clavulanate 500-125mg (AUGMENTIN) 500-125 mg Tab  Take 1 tablet (500 mg total) by mouth once daily. for 2 days             levETIRAcetam (KEPPRA) 100 mg/mL Soln  Take 2.5 mLs (250 mg total) by mouth 2 (two) times daily.             midodrine (PROAMATINE) 5 MG Tab  Take 1 tablet (5 mg total) by mouth every Mon, Wed, Fri. Please take 30 min before dialysis on Monday Wednesday and Friday             ondansetron (ZOFRAN) 8 MG tablet  Take 1 tablet (8 mg total) by mouth every 12 (twelve) hours as needed for Nausea.             pantoprazole (PROTONIX) 40 MG tablet  Take 1 tablet (40 mg total) by  mouth 2 (two) times daily.             promethazine (PHENERGAN) 25 MG tablet  Take 1 tablet (25 mg total) by mouth every 6 (six) hours as needed for Nausea.             RENAPLEX-D 800 mcg-12.5 mg -2,000 unit Tab  Take 1 tablet by mouth once daily.             sevelamer carbonate (RENVELA) 800 mg Tab  Take 1 tablet (800 mg total) by mouth 3 (three) times daily with meals.                       _________________________________  Mariposa Dalton MD  02/18/2020

## 2020-02-18 NOTE — PROGRESS NOTES
[facility]                                 Skilled Nursing [facility]                   Progress Note     Admit Date: 2/18/20  JUAN   Principal Problem:  Debility r/t recent encephalopathy, seizures, aspiration pneumonia  HPI obtained from patient interview and chart review     Visit Date: 2/19/2020    Chief Complaint: Establish Care/ Initial Visit s/p hospitalization for encephalopathy, seizures, aspiration pneumonia    HPI:   Mr. Retana is a 53 y/o male with a pertinent PMHx of Chronic diastolic heart failure, multiple intracranial hemorrhages with left hemiparesis, end-stage renal on HD with secondary hyperparathyroidism, GERD, hep C, HTN, left BKA 2/2 osteomyelitis, latent TB, pulmonary hypertension, noncompliance. He is a long term resident at this facility, and is returning to [facility] as skilled nursing after a hospitalization for new onset seizures. He was admitted to critical care with concerns of new onset seizures and s/p intubation for airway protection. Patient was started on vanc, rocephin, ampicillin, and acyclovir to cover for meningitis. MRI negative>Lumbar puncture was done after MRI>CSF labs not convincing for bacterial or viral meningitis and antibiotics were weaned down. Continuous EEG was done,showed epileptiform discharges. Epilepsy consulted during admit, tx with Keppra titrated to appropriate dose due to sedation.  Patient also experienced aspiration pneumonia, treated with Augmentin.  Nephrology followed for HD txs. Patient extubated, but experienced dysphagia afterwards, he is s/p PEG placement on 2/12 with TF's + dental soft diet. Initially GI recommended no EGD needed, but patient developed coffee-ground emesis, underwent EGD: showed normal duodenum, nonbleeding erosive gastropathy, 2 cm hiatal hernia that was biopsied, intact gastrostomy. He returns to facility under skilled nursing care due to debility.       Patient will be treated at [facility] with PT and OT to improve functional status and ability to perform ADLs.     Past Medical History: Patient has a past medical history of Amputation stump pain (9/10/2013), Aspiration pneumonia (7/27/2015), Asterixis (11/8/2016), C. difficile colitis (8/7/2015), Cholelithiasis without obstruction (8/25/2015), Chronic diastolic heart failure, Chronic low back pain (12/1/2015), Closed head injury (9/8/2016), DVT (deep venous thrombosis) (7/28/2017), ESRD on hemodialysis (2/7/2013), GERD (gastroesophageal reflux disease), HCV antibody positive, Hemiparesis affecting left side as late effect of stroke (11/08/2016), History of Intracerebral Hemorrhage: L BG 5/2013; R BG 9/2016; R BG 11/2016; L caudate head 2/2017 (11/2/2016), Hypertension, left basal ganglia ICH 5/2013 (11/2/2016), Left Caudate Head ICH 2/22/2017 (2/24/2017), 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    Past Surgical History: Patient has a past surgical history that includes R AVF (9/12/12); Leg amputation through knee (12/18/2013); Foot amputation through metatarsal; Colonoscopy; Colonoscopy (N/A, 4/4/2017); Esophagogastroduodenoscopy (N/A, 6/12/2018); Upper gastrointestinal endoscopy; Esophagogastroduodenoscopy (N/A, 3/7/2019); Esophagogastroduodenoscopy (N/A, 9/23/2019); Esophagogastroduodenoscopy (N/A, 10/2/2019); Esophagogastroduodenoscopy (N/A, 11/12/2019); and Esophagogastroduodenoscopy (N/A, 2/14/2020).    Social History: Patient reports that he has quit smoking. He has a 10.00 pack-year smoking history. He has never used smokeless tobacco. He  reports that he does not drink alcohol or use drugs.    Family History: family history includes Alcohol abuse in his maternal grandmother; Diabetes in his brother and maternal grandfather; Early death in his mother; Heart disease in his father; Hyperlipidemia in his father; Hypertension in his father and sister; Kidney disease in his father.    Allergies: Patient is allergic to fosrenol [lanthanum].    ROS  Constitutional: Negative for fever or fatigue.   Eyes: Negative for blurred vision, double vision and discharge.   Respiratory: Negative for cough, shortness of breath and wheezing.    Cardiovascular: Negative for chest pain, palpitations, and leg swelling.   Gastrointestinal: Negative for abdominal pain, constipation, diarrhea, nausea and vomiting.   Genitourinary: Negative for dysuria, frequency and urgency, + PEG tube.   Musculoskeletal:  + generalized weakness. Negative for back pain and myalgias.   Skin: Negative for itching and rash.   Neurological: Negative for dizziness, speech change, and headaches.   Psychiatric/Behavioral: Negative for depression. The patient is not nervous/anxious.      PEx     Constitutional: Patient appears well-developed and in no distress   Head: Normocephalic and atraumatic.   Eyes: Pupils are equal, round, and reactive to light.   Neck: Normal range of motion. Neck supple.   Cardiovascular: Normal rate, regular rhythm and normal heart sounds.    Pulmonary/Chest: Effort normal and breath sounds are clear  Abdominal: Soft. Bowel sounds are normal, + PEG tube.   Musculoskeletal: Normal range of motion.   Neurological: Alert and oriented to person, place, and time.   Psychiatric: Normal mood and affect. Behavior is normal.   Skin: Skin is warm and dry. Full skin assessment completed during visit, no concerns noted      Assessment and Plan:    Aftercare of Recent Seizures, ongoing  Aftercare of recent Acute encephalopathy, resolved  - tonic clonic seizure during admit>intubated>  concern for status>Tx with multiple broad spectrum antibiotics/antivirals>BC neg> LP neg>Possibly multiple foci for seizure overtime>CT without acute changes, MRI brain with chronic changes and hypertensive angiopathy; no acute changes.   -Neurology consulted. repeat EEG on 01/27 shows diffuse slowing, though no focal activity seen on EEG, started on Keppra.   -continue keppra 250mg BID per neuro recs   -continue aspiration precautions, fall precautions, seizure precaution  -2/19 initiate Keppra level to be drawn in 1 month to monitor dose    Debility r/t recent Acute encephalopathy, seizures, ongoing  - Continue with PT/OT for gait training and strengthening and restoration of ADL's   - Encourage mobility, OOB in chair, and early ambulation as appropriate  - Fall precautions   - Monitor for bowel and bladder dysfunction  - Monitor for and prevent skin breakdown and pressure ulcers  - Continue Tylenol PRN pain     After care of Recent Hx of Aspiration PNA, improving  -CXR concerning of aspiration>started oral Augmentin renally dosed for 7 days total (2/19 - end date)  -Continues Augmentin x 2 more days     Oropharyngeal dysphagia, ongoing  S/p peg tube placement on 02/12/20  -likely secondary to acute encephalopathy and possible critical illness myopathy/neuropathy  -IR was consulted, patient is s/p peg tube placement on 02/12/2020  -continue NovaSource tube feeds plus diet my RD to evaluate    Aftercare of Gastrointestinal hemorrhage with hematemesis, improving  Anemia in chronic kidney disease r/t ESRD, ongoing  GERD with esophagitis, ongoing  -gastritis vs PUD vs esophagitis, vs MWT (in setting of vomiting)>multiple prior EGDs.   -Previous EGD 9/23, LA Grade D reflux esophagitis. Medium-sized hiatal hernia. No active GI bleed> received PRBC transfusions during recent stay  -EGD during admit 2/2020 findings: normal duodenum, nonbleeding erosive gastropathy, 2 cm hiatal hernia that was biopsied  -Continue Protonix  40mg BID   -Continue zofran PRN, Phenergan p.r.n.      Gastroparesis, ongoing  -Previous NM gastric emptying study was done > suggestive of gastroparesis retaining 50% of his gastric contents 4 hours after meals> started PPI 40 mg BID, (previously on Reglan TID before meals, and Carafate BID)      HTN with chronic diastolic heart failure, ongoing  ESRD on hemodialysis, ongoing  Chronic kidney disease-mineral and bone disorder, ongoing  Hypoalbuminemia, ongoing  -Dialysis M/W/F via RICHARD AV fistula  -Continue Midodrine On MWF prior to HD ( previously on Continue Coreg 3.125 mg PO BID)  -Continue renvela TID + renal vitamin q.d.  -2/19 initiate ProStat 30 mL p.o. b.i.d.     Remote History of Intracerebral Hemorrhage, multiple, 5/2013; 9/2016; 11/2016; 2/2017  -Not currently on DAPT, or AC given h/o ICH/GIB.      Previous Left BKA 2/2 osteomyelitis  -continue prosthetic       No future appointments.      I certify that SNF services are required to be given on an inpatient basis because Vaughn Retana needs for skilled nursing care and/or skilled rehabilitation are required on a daily basis and such services can only practically be provided in a skilled nursing facility setting and are for an ongoing condition for which she received inpatient care in the hospital.     Total time of the visit 111 minutes  Non physical exam/ non charting time: 80 minutes   Start Time:1000  Stop Time:1151  Description of non physical exam/non charting time: counseling patient on clinical conditions and therapies provided regarding seizure precautions, management of chronic conditions, pain control, respiratory care.  Extensive chart review completed including all consultation notes.  All pertinent laboratory and radiographical images reviewed.        Kimberly Hernandez NP          Patient note was created using MModal Dictation.  Any errors in syntax or even information may not have been identified and edited on initial review prior to  signing this note.

## 2020-02-19 PROBLEM — K31.1 GASTRIC OUTLET OBSTRUCTION: Status: ACTIVE | Noted: 2020-01-01

## 2020-02-19 NOTE — PT/OT/SLP DISCHARGE
Occupational Therapy Discharge Summary    Vaughn Retana  MRN: [id number]   Principal Problem: UGIB (upper gastrointestinal bleed)      Patient Discharged from acute Occupational Therapy on 2/18/20.  Please refer to prior OT note dated 2/17/20 for functional status.    Assessment:      Patient appropriate for care in another setting.    Objective:     GOALS:   Multidisciplinary Problems     Occupational Therapy Goals        Problem: Occupational Therapy Goal    Goal Priority Disciplines Outcome Interventions   Occupational Therapy Goal     OT, PT/OT Ongoing, Progressing    Description:  Updated Goals to be met by: 1 weeks (2/24/20)     Patient will increase functional independence with ADLs by performing:    Grooming while seated with SBA.  Sitting at edge of bed x8 minutes with Supervision while completing functional task.  Supine to sit with CGA.  Sit to stand transfers with Mod A with AD as needed (LLE prosthesis present).  Increased functional strength to WFL for ADLs and mobility tasks.    Goals to be met by: 2 weeks (2/15/20)     Patient will increase functional independence with ADLs by performing:    Grooming while seated with Moderate Assistance.  Sitting at edge of bed x8 minutes with Contact Guard Assistance.  Supine to sit with Moderate Assistance. -MET  Squat pivot transfers with Maximum Assistance.   Sit to stand transfers with MOD A (LLE prosthesis present)  Increased functional strength to WFL for ADLs and mobility tasks.  Pt will follow 3/3 one-step commands to participate in ADL task.                         Reasons for Discontinuation of Therapy Services  Transfer to alternate level of care.      Plan:     Patient Discharged to: Skilled Nursing Facility    WILBUR Richardson  2/19/2020

## 2020-02-20 NOTE — CONSULTS
Consult Note  Gastroenterology       Patient ID:  NAME: Vaughn Retana  MR#: [id number]  : 1965    SUBJECTIVE:  CC: Emesis (coffee ground emesis, was given 8mg zofran at 3pm discharged yesterday, from [facility])      HPI: Mr. Vaughn Retana is a 54 y.o. male w/ PMH  ESRD on HD MWF, L below knee amputation 2/2 osteomyelitis, HFpEF, GERD, h/o multiple ICH w/o residual deficits, Esophagitis, S/P PEG tube placement on 2020, Multiple GI bleeds who presented back to the hospital after being discharged on 2020. He returns with c/o coffee ground emesis. He is a poor historian and could not specify the severity, quantity and onset hematemesis at this time. GI consulted due to the recurrent hematemesis and the PEG tube drained coffee ground output when placed to gravity.     Patient had an EGD done during last admission (2020) and it showed non bleeding erosive gastropathy (biospied), hiatal hernia and PEG tube placement was intact.     Past Medical History:   Diagnosis Date    Amputation stump pain 9/10/2013    Aspiration pneumonia 2015    Asterixis 2016    C. difficile colitis 2015    Cholelithiasis without obstruction 2015    Chronic diastolic heart failure     2--17   1 - Low normal to mildly depressed left ventricular systolic function (EF 50-55%).    2 - Right ventricular enlargement with mildly depressed systolic function.    3 - Left ventricular diastolic dysfunction.    4 - Right atrial enlargement.    5 - Severe tricuspid regurgitation.    6 - Pulmonary hypertension. The estimated PA systolic pressure is 86 mmHg.    7 - Increased central venous pressure.    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 8/1/2015    Metabolic acidosis, IAG, reduced excretion of inorganic acids     Myoclonic jerking 9/20/2016    Noncompliance with medication regimen 12/4/2018    Secondary hyperparathyroidism (of renal origin)     Secondary pulmonary hypertension 3/23/2017    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 HD Patient MWF, K+ lab prior to procedure.     ESOPHAGOGASTRODUODENOSCOPY N/A 6/12/2018    Procedure: EGD (ESOPHAGOGASTRODUODENOSCOPY);  Surgeon: Man Galicia MD;  [facility]  Service: Endoscopy;  Laterality: N/A;  EGD in 8-12 weeks with Dr. Galicia on 4th floor for follow up erosive esophagitis and Mejia's surveillance.    Patient should be on Pantoprazole 40mg every 12 hours or the equivulant of another PPI    awaiting for patient to reply back regarding changing    ESOPHAGOGASTRODUODENOSCOPY N/A 3/7/2019    Procedure: EGD (ESOPHAGOGASTRODUODENOSCOPY);  Surgeon: Man Galicia MD;  Location: [facility]  Service: Endoscopy;  Laterality: N/A;    ESOPHAGOGASTRODUODENOSCOPY N/A 9/23/2019    Procedure: EGD (ESOPHAGOGASTRODUODENOSCOPY);  Surgeon: Keanu Rainey MD;  Location: [facility] ([address] [place] [address]);  Service: Endoscopy;  Laterality: N/A;    ESOPHAGOGASTRODUODENOSCOPY N/A 10/2/2019    Procedure: EGD (ESOPHAGOGASTRODUODENOSCOPY);  Surgeon: Kevin  CHARLEE De La Paz MD;  Location: [facility] (2ND FLR);  Service: Endoscopy;  Laterality: N/A;    ESOPHAGOGASTRODUODENOSCOPY N/A 11/12/2019    Procedure: EGD (ESOPHAGOGASTRODUODENOSCOPY);  Surgeon: Kevin De La Paz MD;  Location: [facility] (2ND FLR);  Service: Endoscopy;  Laterality: N/A;    ESOPHAGOGASTRODUODENOSCOPY N/A 2/14/2020    Procedure: EGD (ESOPHAGOGASTRODUODENOSCOPY);  Surgeon: Kevin De La Paz MD;  Location: [facility] (2ND FLR);  Service: Endoscopy;  Laterality: N/A;    FOOT AMPUTATION THROUGH METATARSAL      left foot    LEG AMPUTATION THROUGH KNEE  12/18/2013    left BKA    R AVF  9/12/12    UPPER GASTROINTESTINAL ENDOSCOPY       Family History   Problem Relation Age of Onset    Early death Mother     Kidney disease Father     Hypertension Father     Heart disease Father     Hyperlipidemia Father     Diabetes Brother     Alcohol abuse Maternal Grandmother     Diabetes Maternal Grandfather     Hypertension Sister     Melanoma Neg Hx      Social History     Socioeconomic History    Marital status:      Spouse name: Not on file    Number of children: Not on file    Years of education: Not on file    Highest education level: Not on file   Occupational History    Not on file   Social Needs    Financial resource strain: Not on file    Food insecurity:     Worry: Not on file     Inability: Not on file    Transportation needs:     Medical: Not on file     Non-medical: Not on file   Tobacco Use    Smoking status: Former Smoker     Packs/day: 1.00     Years: 10.00     Pack years: 10.00    Smokeless tobacco: Never Used   Substance and Sexual Activity    Alcohol use: No    Drug use: No    Sexual activity: Yes     Partners: Female     Birth control/protection: None   Lifestyle    Physical activity:     Days per week: Not on file     Minutes per session: Not on file    Stress: Not on file   Relationships    Social connections:     Talks on phone: Not on file     Gets together: Not  on file     Attends Muslim service: Not on file     Active member of club or organization: Not on file     Attends meetings of clubs or organizations: Not on file     Relationship status: Not on file   Other Topics Concern    Not on file   Social History Narrative    Not on file     Immunization History   Administered Date(s) Administered    Influenza - Quadrivalent 09/13/2017, 09/21/2018    Influenza - Trivalent - PF (ADULT) 09/21/2015    PPD Test 10/04/2019     No current facility-administered medications on file prior to encounter.      Current Outpatient Medications on File Prior to Encounter   Medication Sig Dispense Refill    acetaminophen (TYLENOL) 325 MG tablet Take 2 tablets (650 mg total) by mouth every 8 (eight) hours as needed.  0    amoxicillin-clavulanate 500-125mg (AUGMENTIN) 500-125 mg Tab Take 1 tablet (500 mg total) by mouth once daily. for 2 days 2 tablet 0    levETIRAcetam (KEPPRA) 100 mg/mL Soln Take 2.5 mLs (250 mg total) by mouth 2 (two) times daily. 150 mL 11    midodrine (PROAMATINE) 5 MG Tab Take 1 tablet (5 mg total) by mouth every Mon, Wed, Fri. Please take 30 min before dialysis on Monday Wednesday and Friday 60 tablet 3    ondansetron (ZOFRAN) 8 MG tablet Take 1 tablet (8 mg total) by mouth every 12 (twelve) hours as needed for Nausea. 60 tablet 1    pantoprazole (PROTONIX) 40 MG tablet Take 1 tablet (40 mg total) by mouth 2 (two) times daily. 60 tablet 11    promethazine (PHENERGAN) 25 MG tablet Take 1 tablet (25 mg total) by mouth every 6 (six) hours as needed for Nausea. 15 tablet 0    RENAPLEX-D 800 mcg-12.5 mg -2,000 unit Tab Take 1 tablet by mouth once daily.  3    sevelamer carbonate (RENVELA) 800 mg Tab Take 1 tablet (800 mg total) by mouth 3 (three) times daily with meals. 90 tablet 3     Review of patient's allergies indicates:   Allergen Reactions    Fosrenol [lanthanum] Nausea And Vomiting     Nausea and vomiting       ROS    OBJECTIVE:  Initial Vitals   BP  "Pulse Resp Temp SpO2   02/19/20 1817 02/19/20 1817 02/19/20 1817 02/19/20 1819 02/19/20 1817   (!) 149/91 106 (!) 22 98.6 °F (37 °C) 99 %      MAP       --                Vitals:    02/20/20 0300 02/20/20 0400 02/20/20 0435 02/20/20 0700   BP:  (!) 155/88 (!) 155/88 (!) 165/86   BP Location:    Left arm   Patient Position:    Lying   Pulse:  109 109 101   Resp:  20 20 18   Temp:  98.4 °F (36.9 °C) 98.4 °F (36.9 °C) 98.1 °F (36.7 °C)   TempSrc:  Oral  Oral   SpO2:   96% 100%   Weight: 53.4 kg (117 lb 11.6 oz)      Height: 5' 6" (1.676 m)        Vitals 2/20/2020   Height 5' 6"   Weight (lbs) 117.73   BMI (kg/m2) -       Physical Exam   Constitutional: He is oriented to person, place, and time. No distress.   HENT:   Head: Normocephalic and atraumatic.   Eyes: Pupils are equal, round, and reactive to light.   Neck: Normal range of motion. No JVD present. No tracheal deviation present.   Cardiovascular: Normal rate, regular rhythm and normal heart sounds.   No murmur heard.  RUE AVF with a good thrill    Pulmonary/Chest: Effort normal and breath sounds normal. No respiratory distress. He has no wheezes.   Abdominal: Soft. Bowel sounds are normal. He exhibits no distension. There is no tenderness.   Coffee ground output in the PEG tube drainage noted   Musculoskeletal: He exhibits no edema.   Left BKA noted     Neurological: He is alert and oriented to person, place, and time.   Skin: Skin is warm. He is not diaphoretic. No erythema.   Psychiatric: Judgment normal. He has a flat affect.   Patient was sleepy and very reluctant in answering questions during the interview          CRANIAL NERVES     CN III, IV, VI   Pupils are equal, round, and reactive to light.        Laboratory:   Recent Results (from the past 24 hour(s))   POCT glucose    Collection Time: 02/19/20  7:34 PM   Result Value Ref Range    POCT Glucose 74 70 - 110 mg/dL   CBC auto differential    Collection Time: 02/19/20  7:41 PM   Result Value Ref Range    " WBC 6.17 3.90 - 12.70 K/uL    RBC 3.80 (L) 4.60 - 6.20 M/uL    Hemoglobin 10.4 (L) 14.0 - 18.0 g/dL    Hematocrit 34.3 (L) 40.0 - 54.0 %    Mean Corpuscular Volume 90 82 - 98 fL    Mean Corpuscular Hemoglobin 27.4 27.0 - 31.0 pg    Mean Corpuscular Hemoglobin Conc 30.3 (L) 32.0 - 36.0 g/dL    RDW 15.9 (H) 11.5 - 14.5 %    Platelets 284 150 - 350 K/uL    MPV 9.6 9.2 - 12.9 fL    Immature Granulocytes 0.6 (H) 0.0 - 0.5 %    Gran # (ANC) 4.4 1.8 - 7.7 K/uL    Immature Grans (Abs) 0.04 0.00 - 0.04 K/uL    Lymph # 1.0 1.0 - 4.8 K/uL    Mono # 0.5 0.3 - 1.0 K/uL    Eos # 0.2 0.0 - 0.5 K/uL    Baso # 0.03 0.00 - 0.20 K/uL    nRBC 0 0 /100 WBC    Gran% 71.1 38.0 - 73.0 %    Lymph% 16.4 (L) 18.0 - 48.0 %    Mono% 8.6 4.0 - 15.0 %    Eosinophil% 2.8 0.0 - 8.0 %    Basophil% 0.5 0.0 - 1.9 %    Differential Method Automated    Comprehensive metabolic panel    Collection Time: 02/19/20  7:41 PM   Result Value Ref Range    Sodium 143 136 - 145 mmol/L    Potassium 3.5 3.5 - 5.1 mmol/L    Chloride 100 95 - 110 mmol/L    CO2 30 (H) 23 - 29 mmol/L    Glucose 78 70 - 110 mg/dL    BUN, Bld 12 6 - 20 mg/dL    Creatinine 3.8 (H) 0.5 - 1.4 mg/dL    Calcium 10.1 8.7 - 10.5 mg/dL    Total Protein 10.1 (H) 6.0 - 8.4 g/dL    Albumin 2.9 (L) 3.5 - 5.2 g/dL    Total Bilirubin 0.5 0.1 - 1.0 mg/dL    Alkaline Phosphatase 208 (H) 55 - 135 U/L    AST 25 10 - 40 U/L    ALT 8 (L) 10 - 44 U/L    Anion Gap 13 8 - 16 mmol/L    eGFR if African American 19.4 (A) >60 mL/min/1.73 m^2    eGFR if non  16.8 (A) >60 mL/min/1.73 m^2   Lipase    Collection Time: 02/19/20  7:41 PM   Result Value Ref Range    Lipase 25 4 - 60 U/L   APTT    Collection Time: 02/19/20  7:41 PM   Result Value Ref Range    aPTT 25.7 21.0 - 32.0 sec   Protime-INR    Collection Time: 02/19/20  7:41 PM   Result Value Ref Range    Prothrombin Time 11.5 9.0 - 12.5 sec    INR 1.1 0.8 - 1.2   Type & Screen    Collection Time: 02/19/20  7:41 PM   Result Value Ref Range    Group  & Rh A POS     Indirect Marci NEG    POCT glucose    Collection Time: 02/19/20  9:13 PM   Result Value Ref Range    POCT Glucose 114 (H) 70 - 110 mg/dL   Lactic acid, plasma    Collection Time: 02/19/20 11:14 PM   Result Value Ref Range    Lactate (Lactic Acid) 1.1 0.5 - 2.2 mmol/L   Comprehensive Metabolic Panel (CMP)    Collection Time: 02/20/20  4:42 AM   Result Value Ref Range    Sodium 139 136 - 145 mmol/L    Potassium 3.8 3.5 - 5.1 mmol/L    Chloride 103 95 - 110 mmol/L    CO2 23 23 - 29 mmol/L    Glucose 62 (L) 70 - 110 mg/dL    BUN, Bld 15 6 - 20 mg/dL    Creatinine 4.3 (H) 0.5 - 1.4 mg/dL    Calcium 9.2 8.7 - 10.5 mg/dL    Total Protein 8.8 (H) 6.0 - 8.4 g/dL    Albumin 2.5 (L) 3.5 - 5.2 g/dL    Total Bilirubin 0.5 0.1 - 1.0 mg/dL    Alkaline Phosphatase 173 (H) 55 - 135 U/L    AST 21 10 - 40 U/L    ALT 6 (L) 10 - 44 U/L    Anion Gap 13 8 - 16 mmol/L    eGFR if African American 16.7 (A) >60 mL/min/1.73 m^2    eGFR if non African American 14.5 (A) >60 mL/min/1.73 m^2   CBC with Automated Differential    Collection Time: 02/20/20  4:43 AM   Result Value Ref Range    WBC 4.52 3.90 - 12.70 K/uL    RBC 3.83 (L) 4.60 - 6.20 M/uL    Hemoglobin 10.7 (L) 14.0 - 18.0 g/dL    Hematocrit 35.3 (L) 40.0 - 54.0 %    Mean Corpuscular Volume 92 82 - 98 fL    Mean Corpuscular Hemoglobin 27.9 27.0 - 31.0 pg    Mean Corpuscular Hemoglobin Conc 30.3 (L) 32.0 - 36.0 g/dL    RDW 16.4 (H) 11.5 - 14.5 %    Platelets 217 150 - 350 K/uL    MPV 11.4 9.2 - 12.9 fL    Immature Granulocytes 0.4 0.0 - 0.5 %    Gran # (ANC) 2.7 1.8 - 7.7 K/uL    Immature Grans (Abs) 0.02 0.00 - 0.04 K/uL    Lymph # 1.1 1.0 - 4.8 K/uL    Mono # 0.5 0.3 - 1.0 K/uL    Eos # 0.1 0.0 - 0.5 K/uL    Baso # 0.02 0.00 - 0.20 K/uL    nRBC 0 0 /100 WBC    Gran% 60.3 38.0 - 73.0 %    Lymph% 25.2 18.0 - 48.0 %    Mono% 10.6 4.0 - 15.0 %    Eosinophil% 3.1 0.0 - 8.0 %    Basophil% 0.4 0.0 - 1.9 %    Differential Method Automated      Imaging Results          CT Abdomen  Pelvis With Contrast (Final result)  Result time 02/20/20 00:53:08    Final result by Byron Maynard MD (02/20/20 00:53:08)                 Impression:      Moderate volume stool in the colon with rectal wall thickening, possibly secondary to constipation and/or stercoral colitis.  Rectal mass could have a similar appearance, and short-term follow-up with endoscopy is recommended in this patient with history of GI bleeding to exclude malignancy.    Symmetric thickening of the urinary bladder wall which could be due to relative nondistention, cystitis, or bladder outlet obstruction in the setting of prostatomegaly.  Correlation with urinalysis recommended.    Cholelithiasis. No evidence of cholecystitis    Small hiatal hernia and gastrostomy tube in place.  Mild lower esophageal wall thickening which could reflect a nonspecific esophagitis.    Atrophic, cystic kidneys in keeping with end-stage renal disease.    Age-advanced calcific atherosclerosis of the aorta and branch vessels.    Additional findings detailed above.    Electronically signed by resident: Jerad Veras MD  Date:    02/19/2020  Time:    23:50    Electronically signed by: Byron Maynard MD  Date:    02/20/2020  Time:    00:53             Narrative:    EXAMINATION:  CT ABDOMEN PELVIS WITH CONTRAST    CLINICAL HISTORY:  Abdominal distension;GI bleeding;Bowel obstruction, high-grade;    TECHNIQUE:  The patient was surveyed from the lung bases through the pelvis after the administration of 75 cc Omni 350 IV contrast as well 30 cc Omnipaque 350 oral contrast and data was reconstructed for coronal, sagittal, and axial images.    COMPARISON:  CT chest abdomen non coronary 09/02/2019.  CT abdomen and pelvis, 04/03/2018.    FINDINGS:  The heart is normal in size.  There is no pericardial effusion.  Mild lower esophageal wall thickening.    The lung bases are symmetrically expanded.  Detailed evaluation of the pulmonary parenchyma is limited by  respiratory motion.  There is a calcified granuloma in the anteromedial basal segment left lower lobe.  No consolidation, pleural thickening, pleural fluid, or pneumothorax.    Evaluation of the upper abdomen is limited by motion degradation.    Liver is normal in size and attenuation with no focal hepatic abnormality identified.  Large calcified gallstones are present in the gallbladder.  No gallbladder wall thickening or pericholecystic fluid.  No evidence of intra or extrahepatic biliary ductal dilatation.    There is a small hiatal hernia and a gastrostomy tube in place.  The spleen, pancreas, and adrenal glands are unremarkable.    The kidneys are normal in location.  There is bilateral cortical thinning and numerous parenchymals cysts in keeping with end-stage renal disease.  There bilateral vascular calcifications versus nonobstructing nephroliths.  No hydronephrosis identified.  The urinary bladder demonstrates concentric wall thickening.  The prostate is enlarged.    The abdominal aorta is normal in course and caliber with age-advanced calcific atherosclerosis extending into the branch vessels.    Moderate volume stool in the colon.  Residual contrast is present in the descending colon and rectum from prior enteric contrast administration.  There is nonspecific wall thickening involving the lower rectum.  No small bowel obstruction.  There is no abdominopelvic ascites, intraperitoneal free air or bulky abdominopelvic lymphadenopathy.    Osseous structures demonstrate age-appropriate degenerative change, and are diffusely sclerotic suggestive of renal osteodystrophy.  Multiple Schmorl's nodes noted throughout the thoracolumbar spine.  No acute fracture, focal lytic or sclerotic lesion identified.  The extraperitoneal soft tissues reveal nothing unusual.                               X-Ray Abdomen Flat And Erect (Final result)  Result time 02/19/20 21:53:31    Final result by Byron Maynard MD (02/19/20  21:53:31)                 Impression:      Nonobstructive bowel gas pattern.    Incidental findings discussed in the body of the report.      Electronically signed by: Byron Maynard MD  Date:    02/19/2020  Time:    21:53             Narrative:    EXAMINATION:  XR ABDOMEN FLAT AND ERECT    CLINICAL HISTORY:  Vomiting, unspecified    TECHNIQUE:  Flat and erect frontal views of the abdomen were performed.    COMPARISON:  02/11/2020.    FINDINGS:  Cardiac wires overlie the abdomen and pelvis.  Retained contrast material is present in the descending colon and rectum.  Percutaneous gastrostomy tube overlies the stomach.  Bowel gas pattern is nonobstructive.  No large volume stool burden.  Round calcification overlies the right upper quadrant of the abdomen, likely a large gallstone.  Bones are demineralized.                               X-Ray Chest AP Portable (Final result)  Result time 02/19/20 20:35:52    Final result by Wade Edward MD (02/19/20 20:35:52)                 Impression:      1. Since the previous examination, there is been interval improvement of the patchy interstitial and parenchymal abnormalities as described on previous exam.  There may be residual congestive change/edema noting no new large focal consolidation.  Correlation is advised.      Electronically signed by: Wade Edward MD  Date:    02/19/2020  Time:    20:35             Narrative:    EXAMINATION:  XR CHEST AP PORTABLE    CLINICAL HISTORY:  vomiting;    TECHNIQUE:  Single frontal view of the chest was performed.    COMPARISON:  02/12/2020    FINDINGS:  The cardiomediastinal silhouette is not enlarged, stable as compared to the previous exam.  Right vascular stent noted..  There is no pleural effusion.  The trachea is midline.  The lungs are symmetrically expanded bilaterally with mildly coarse central hilar interstitial attenuation, improved since the previous exam.  No large focal consolidation seen.  There is no pneumothorax.   The osseous structures are remarkable for degenerative changes..                                ASSESSMENT & PLAN:      Patient is a 53 y/o male with a Hx of recurrent UGIB, Gastroparesis, S/P Peg tube placement (02/12/2020), S/P EGD on 02/14/2020 which showed non bleeding erosive gastropathy, hiatal hernia. Presents today with similar complaints of coffee ground emesis . H/H stable compared to the most recent labs (10.7/35) today and he is hemodynamically stable.     - Patient's symptoms are likely due to ongoing gastroparesis and Hx of erosive gastropathy . We will not be performing an EGD/Colonoscopy at this time.   - Recommend observing the patient overnight, antiemetics as needed  - Continue resuscitation as needed  - Protonix IV BID  - trend H&H and transfuse as indicated.  - avoid NSAIDs, anticoagulation  - Please notify the GI team if there are any changes in the patient's clinical status.  - We will continue to follow.        All recommendations are preliminary and pending staff attestation.      Appreciate the Consult and Will continue to follow along      Trinidad Corbin MD   PGY-2  Gastroenterology  [facility]

## 2020-02-20 NOTE — PT/OT/SLP EVAL
Physical Therapy Evaluation    Patient Name:  Vaughn Retana   MRN:  [id number]    Recommendations:     Discharge Recommendations:  nursing facility, skilled(w/transition back to basic NH)   Discharge Equipment Recommendations: none   Barriers to discharge: None    Assessment:     Vaughn Retana is a 54 y.o. male admitted with a medical diagnosis of Hematemesis.  He presents with the following impairments/functional limitations:  weakness, impaired functional mobilty, impaired cognition, decreased safety awareness, impaired coordination, impaired endurance, gait instability, impaired balance, impaired self care skills. Pt lethargic and a poor historian unable to provide accurate hx and remain alert. Pt demonstrated difficulty following structured conversation throughout this session requiring constant VC to remain alert and answered ~25-45% of questions during this eval. Pt was able to state he is a NH resident that is bed bound, not standing, not transferring OOB, and not participating w/therapy. Per recent notes it appears pt was recently discharged to SNF and unable to participate enough to determine if pt has plateaued with therapy. Acute therapy will place pt on a trial period to further assess and determine if pt is appropriate to continue w/progressive skilled therapy.     Rehab Prognosis: Fair/Poor; patient would benefit from acute skilled PT services to address these deficits and reach maximum level of function.    Recent Surgery: * No surgery found *      Plan:     During this hospitalization, patient to be seen 2 x/week to address the identified rehab impairments via gait training, therapeutic activities, therapeutic exercises, neuromuscular re-education and progress toward the following goals:    · Plan of Care Expires:  03/20/20    Subjective     Patient/Family Comments/goals: Minimally verbal   Pain/Comfort:  · Pain Rating 1: 0/10    Patients cultural, spiritual, Confucianist conflicts given the  current situation: no    Living Environment:  Per notes pt is a NH resident.   Prior to admission, patients level of function was unable to determine.  Equipment used at home: walker, standard, hospital bed.  DME owned (not currently used): none.  Upon discharge, patient will have assistance from NH staff.    Objective:     Communicated with NSG prior to session.  Patient found supine with telemetry, PEG Tube, bed alarm  upon PT entry to room.    General Precautions: Standard, fall   Orthopedic Precautions:N/A   Braces: N/A     AROM- R knee flex 25deg d/t hamstring restrictions     Functional Mobility:  · Bed Mobility:     · Rolling Left:  moderate assistance  · Scooting: moderate assistance  · Supine to Sit: moderate assistance  · Sit to Supine: contact guard assistance  · Balance: Sitting: Min A      Therapeutic Activities and Exercises:   - EOB sitting: ~3mins Min A  - Pt educated on:   -PT roles, expectations, and POC    -Safety with mobility   -Benefits of OOB activities to increase strength and functional mobility    -Performing ther ex for increasing LE ROM and strength   -Discharge recommendations     AM-PAC 6 CLICK MOBILITY  Total Score:8     Patient left supine with call button in reach.    GOALS:   Multidisciplinary Problems     Physical Therapy Goals        Problem: Physical Therapy Goal    Goal Priority Disciplines Outcome Goal Variances Interventions   Physical Therapy Goal     PT, PT/OT Ongoing, Progressing     Description:  Goals to be met by: 3/20/2020    Patient will increase functional independence with mobility by performin. Supine to sit with Contact Guard Assistance  2. Sit to stand transfer with Moderate Assistance  3. Bed to chair transfer with Moderate Assistance   4. Lower extremity exercise program x15 reps per handout, with independence                     History:     Past Medical History:   Diagnosis Date    Amputation stump pain 9/10/2013    Aspiration pneumonia 2015     Asterixis 11/8/2016    C. difficile colitis 8/7/2015    Cholelithiasis without obstruction 8/25/2015    Chronic diastolic heart failure     2-23-17   1 - Low normal to mildly depressed left ventricular systolic function (EF 50-55%).    2 - Right ventricular enlargement with mildly depressed systolic function.    3 - Left ventricular diastolic dysfunction.    4 - Right atrial enlargement.    5 - Severe tricuspid regurgitation.    6 - Pulmonary hypertension. The estimated PA systolic pressure is 86 mmHg.    7 - Increased central venous pressure.    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    Metabolic acidosis, IAG, reduced excretion of inorganic acids     Myoclonic jerking 9/20/2016    Noncompliance with medication regimen 12/4/2018    Secondary hyperparathyroidism (of renal origin)     Secondary pulmonary hypertension 3/23/2017    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    TB lung, latent 08/25/2015    Negative Quantiferon Gold 3-23-17       Past Surgical History:   Procedure Laterality Date    COLONOSCOPY      COLONOSCOPY N/A 4/4/2017    Procedure: COLONOSCOPY;  Surgeon: Walker Stern MD;  Location: [address]);  Service: Endoscopy;  Laterality: N/A;  PA Systolic Pressure 85.56. HD Patient MWF, K+ lab prior to procedure.     ESOPHAGOGASTRODUODENOSCOPY N/A 6/12/2018    Procedure: EGD (ESOPHAGOGASTRODUODENOSCOPY);   Surgeon: Man Galicia MD;  [address]  Service: Endoscopy;  Laterality: N/A;  EGD in 8-12 weeks with Dr. Galicia on 4th floor for follow up erosive esophagitis and Mejia's surveillance.    Patient should be on Pantoprazole 40mg every 12 hours or the equivulant of another PPI    awaiting for patient to reply back regarding changing    ESOPHAGOGASTRODUODENOSCOPY N/A 3/7/2019    Procedure: EGD (ESOPHAGOGASTRODUODENOSCOPY);  Surgeon: aMn Galicia MD;  [facility]  Service: Endoscopy;  Laterality: N/A;    ESOPHAGOGASTRODUODENOSCOPY N/A 9/23/2019    Procedure: EGD (ESOPHAGOGASTRODUODENOSCOPY);  Surgeon: Keanu Rainey MD;  [address]  Service: Endoscopy;  Laterality: N/A;    ESOPHAGOGASTRODUODENOSCOPY N/A 10/2/2019    Procedure: EGD (ESOPHAGOGASTRODUODENOSCOPY);  Surgeon: Kevin De La Paz MD;  Location: [address]);  Service: Endoscopy;  Laterality: N/A;    ESOPHAGOGASTRODUODENOSCOPY N/A 11/12/2019    Procedure: EGD (ESOPHAGOGASTRODUODENOSCOPY);  Surgeon: Kevin De La Paz MD;  Location: [address]);  Service: Endoscopy;  Laterality: N/A;    ESOPHAGOGASTRODUODENOSCOPY N/A 2/14/2020    Procedure: EGD (ESOPHAGOGASTRODUODENOSCOPY);  Surgeon: Kevin De La Paz MD;  Location: [address]);  Service: Endoscopy;  Laterality: N/A;    FOOT AMPUTATION THROUGH METATARSAL      left foot    LEG AMPUTATION THROUGH KNEE  12/18/2013    left BKA    R AVF  9/12/12    UPPER GASTROINTESTINAL ENDOSCOPY         Time Tracking:     PT Received On: 02/20/20  PT Start Time: 0840     PT Stop Time: 0853  PT Total Time (min): 13 min     Billable Minutes: Evaluation 13      Kt Aggarwal, PT  02/20/2020

## 2020-02-20 NOTE — PLAN OF CARE
02/20/20 1007   Post-Acute Status   Post-Acute Authorization Placement   Post-Acute Placement Status Awaiting Internal Medical Clearance

## 2020-02-20 NOTE — CONSULTS
[facility]  Nephrology  Consult Note    Patient Name: Vaughn Retana  MRN: [id number]  Admission Date: 2/19/2020  Hospital Length of Stay: 1 days  Attending Provider: Jaskaran Colon MD   Primary Care Physician: Cori Randall MD  Principal Problem:Hematemesis    Inpatient consult to Nephrology  Consult performed by: Glenis Benavidez DNP, FNP-C  Consult ordered by: Blake Sparks MD  Reason for consult: ESRD Management        Subjective:     HPI: The patient is a 54 year-old AA male with a history of multiple instances of GI bleed (just discharged yesterday underwent EGD while admitted 2/14), ESRD on dialysis MWF, L below knee amputation 2/2 osteomyelitis, HFpEF, GERD, and h/o multiple ICH w/o residual deficits presents to the ED from [facility] after he was noted to have coffee ground emesis. The patient was recently hospitalized from 1/20/2020-2/18/2020 for acute encephalopathy, seizures which resolved, aspiration pneumonia which resolved, hematemesis, and severe malnutrition now s/p a PEG tube placement. The patient was sent to SNF on 2/18 for PT and OT rehab to improve functional status and ability to perform ADLs. He returned to the ED the following day with the aforementioned complaints. The patient refuses to answer any direct questions on assessment this AM. Unsure if the patient dialyze outpatient on yesterday. However, no acute metabolic derangements noted on AM lab work. Patient appears comfortable on room air. The patient was previously a MWF dialysis patient at [facility] under the direction of Dr. Lemus. Nephrology consulted for management of ESRD and HD treatment.    Past Medical History:   Diagnosis Date    Amputation stump pain 9/10/2013    Aspiration pneumonia 7/27/2015    Asterixis 11/8/2016    C. difficile colitis 8/7/2015    Cholelithiasis without obstruction 8/25/2015    Chronic diastolic heart failure     2-23-17   1 - Low normal to mildly  depressed left ventricular systolic function (EF 50-55%).    2 - Right ventricular enlargement with mildly depressed systolic function.    3 - Left ventricular diastolic dysfunction.    4 - Right atrial enlargement.    5 - Severe tricuspid regurgitation.    6 - Pulmonary hypertension. The estimated PA systolic pressure is 86 mmHg.    7 - Increased central venous pressure.    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    Metabolic acidosis, IAG, reduced excretion of inorganic acids     Myoclonic jerking 9/20/2016    Noncompliance with medication regimen 12/4/2018    Secondary hyperparathyroidism (of renal origin)     Secondary pulmonary hypertension 3/23/2017    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    TB lung, latent 08/25/2015    Negative Quantiferon Gold 3-23-17       Past Surgical History:   Procedure Laterality Date    COLONOSCOPY      COLONOSCOPY N/A 4/4/2017    Procedure: COLONOSCOPY;  Surgeon: Walker Stern MD;  Location: [address]);  Service: Endoscopy;  Laterality: N/A;  PA Systolic Pressure 85.56. HD Patient MWF, K+ lab prior to procedure.     ESOPHAGOGASTRODUODENOSCOPY N/A 6/12/2018    Procedure: EGD (ESOPHAGOGASTRODUODENOSCOPY);  Surgeon: Man Galicia MD;  Location: [facility] ([address] [place] [address]);  Service: Endoscopy;  Laterality: N/A;  EGD in 8-12 weeks with Dr. Galicia on 4th floor for follow up erosive esophagitis  and Mejia's surveillance.    Patient should be on Pantoprazole 40mg every 12 hours or the equivulant of another PPI    awaiting for patient to reply back regarding changing    ESOPHAGOGASTRODUODENOSCOPY N/A 3/7/2019    Procedure: EGD (ESOPHAGOGASTRODUODENOSCOPY);  Surgeon: Man Galicia MD;  [facility]  Service: Endoscopy;  Laterality: N/A;    ESOPHAGOGASTRODUODENOSCOPY N/A 9/23/2019    Procedure: EGD (ESOPHAGOGASTRODUODENOSCOPY);  Surgeon: Keanu Rainey MD;  Location: [facility] (2ND FLR);  Service: Endoscopy;  Laterality: N/A;    ESOPHAGOGASTRODUODENOSCOPY N/A 10/2/2019    Procedure: EGD (ESOPHAGOGASTRODUODENOSCOPY);  Surgeon: Kevin De La Paz MD;  [facility]  Service: Endoscopy;  Laterality: N/A;    ESOPHAGOGASTRODUODENOSCOPY N/A 11/12/2019    Procedure: EGD (ESOPHAGOGASTRODUODENOSCOPY);  Surgeon: Kevin De La Paz MD;  [facility]  Service: Endoscopy;  Laterality: N/A;    ESOPHAGOGASTRODUODENOSCOPY N/A 2/14/2020    Procedure: EGD (ESOPHAGOGASTRODUODENOSCOPY);  Surgeon: Kevin De La Paz MD;  [facility]  Service: Endoscopy;  Laterality: N/A;    FOOT AMPUTATION THROUGH METATARSAL      left foot    LEG AMPUTATION THROUGH KNEE  12/18/2013    left BKA    R AVF  9/12/12    UPPER GASTROINTESTINAL ENDOSCOPY         Review of patient's allergies indicates:   Allergen Reactions    Fosrenol [lanthanum] Nausea And Vomiting     Nausea and vomiting     Current Facility-Administered Medications   Medication Frequency    [START ON 2/21/2020] 0.9%  NaCl infusion Once    albuterol-ipratropium 2.5 mg-0.5 mg/3 mL nebulizer solution 3 mL Q6H PRN    glucose chewable tablet 16 g PRN    glucose chewable tablet 24 g PRN    labetalol 20 mg/4 mL (5 mg/mL) IV syring Q6H PRN    levETIRAcetam (KEPPRA) 250 mg in dextrose 5 % 100 mL IVPB Q12H    ondansetron injection 4 mg Q8H PRN    pantoprazole injection 40 mg BID    promethazine  (PHENERGAN) 6.25 mg in dextrose 5 % 50 mL IVPB Q6H PRN    sodium chloride 0.9% flush 10 mL PRN     Family History     Problem Relation (Age of Onset)    Alcohol abuse Maternal Grandmother    Diabetes Brother, Maternal Grandfather    Early death Mother    Heart disease Father    Hyperlipidemia Father    Hypertension Father, Sister    Kidney disease Father        Tobacco Use    Smoking status: Former Smoker     Packs/day: 1.00     Years: 10.00     Pack years: 10.00    Smokeless tobacco: Never Used   Substance and Sexual Activity    Alcohol use: No    Drug use: No    Sexual activity: Yes     Partners: Female     Birth control/protection: None     Review of Systems   Unable to perform ROS: Acuity of condition     Objective:     Vital Signs (Most Recent):  Temp: 98.1 °F (36.7 °C) (02/20/20 0700)  Pulse: 101 (02/20/20 0700)  Resp: 18 (02/20/20 0700)  BP: (!) 165/86 (02/20/20 0700)  SpO2: 100 % (02/20/20 0700)  O2 Device (Oxygen Therapy): room air (02/20/20 0300) Vital Signs (24h Range):  Temp:  [97.9 °F (36.6 °C)-98.6 °F (37 °C)] 98.1 °F (36.7 °C)  Pulse:  [101-110] 101  Resp:  [14-22] 18  SpO2:  [96 %-100 %] 100 %  BP: (105-191)/() 165/86     Weight: 53.4 kg (117 lb 11.6 oz) (02/20/20 0300)  Body mass index is 19 kg/m².  Body surface area is 1.58 meters squared.    I/O last 3 completed shifts:  In: 666.7 [I.V.:41.7; IV Piggyback:625]  Out: 350 [Emesis/NG output:150; Other:200]    Physical Exam   Constitutional: He is oriented to person, place, and time. He appears well-developed. No distress.   Chronically ill appearing   HENT:   Head: Normocephalic and atraumatic.   Right Ear: External ear normal.   Left Ear: External ear normal.   Nose: Nose normal.   Mouth/Throat: Oropharynx is clear and moist.   Eyes: Right eye exhibits no discharge. Left eye exhibits no discharge.   Neck: Normal range of motion. Neck supple.   Cardiovascular: Normal rate, regular rhythm, normal heart sounds and intact distal pulses.    RUE AV fistula, +bruit, +thrill   Pulmonary/Chest: Effort normal and breath sounds normal. No respiratory distress. He has no wheezes. He has no rales.   Abdominal: Soft. Bowel sounds are normal. He exhibits no distension. There is no tenderness.   Peg tube present   Musculoskeletal: He exhibits no edema or tenderness.   Left BKA   Neurological: He is alert and oriented to person, place, and time.   Skin: Skin is warm and dry. Capillary refill takes less than 2 seconds. He is not diaphoretic.   Psychiatric: He is withdrawn. He is noncommunicative.   Nursing note and vitals reviewed.      Significant Labs:  CBC:   Recent Labs   Lab 02/20/20  0443   WBC 4.52   RBC 3.83*   HGB 10.7*   HCT 35.3*      MCV 92   MCH 27.9   MCHC 30.3*     CMP:   Recent Labs   Lab 02/20/20  0442   GLU 62*   CALCIUM 9.2   ALBUMIN 2.5*   PROT 8.8*      K 3.8   CO2 23      BUN 15   CREATININE 4.3*   ALKPHOS 173*   ALT 6*   AST 21   BILITOT 0.5         Assessment/Plan:     * Hematemesis  - per primary team     ESRD on dialysis  The patient is a 55 yo AA male well known to the ESRD service who was admitted on 2/19 with complaints of hematemesis. He has a chronic hx of multiple GIBs and complaints of hematemesis.     [facility]  Dr. Lemus   3.4 hrs  YOANNA AVF  Oliguric     Assessment:   - No urgent need for dialysis at today. No signs of volume overload. Electrolytes and acid/base stable. BP stable. No clinical signs of uremia.   - Will plan for HD tomorrow for metabolic clearance and volume management  - Renal restrictions   - Strict I/Os and daily weights  - Hgb 10.7, within target   - Restart phos binders   - Daily renal function panels   - Will discuss with Dr. Delong           Thank you for your consult. I will follow-up with patient. Please contact us if you have any additional questions.    Glenis Benavidez, ADILIA, FNP-C  Nephrology  [facility]

## 2020-02-20 NOTE — PLAN OF CARE
Went over plan of care - no questions. No vomiting or nausea. No complaints voiced. No falls or injuries. The peg tub which is hooked to a stevens drainage bag - no out put since pt has been in room. Will continue to monitor

## 2020-02-20 NOTE — H&P
[facility] Medicine  History and Physical Exam    [place] [facility] [place] Blake Sparks MD  Admit Date: 2/19/2020  Principal Problem:  Hematemesis   Patient information was obtained from patient, past medical records and ER records  Primary care Physician: Cori Randall MD  Code status: Full Code    HPI:   53 yo M with PMHx multiple instances of GI bleed (just discharged yesterday underwent EGD while admitted 2/14), ESRD on dialysis MWF, L below knee amputation 2/2 osteomyelitis, HFpEF, GERD, and h/o multiple ICH w/o residual deficits presents to the ED from SNF after he was noted to have coffee ground emesis. The patient is a poor historian and is unable to quantify the amount or give timing of when the vomiting occurred. The patient states he has not eaten in 4 days, but records show that he has been having some PO intake. He is uncertain of when his PEG tube was used last. He denies any active nausea or abdominal pain. He does not know ehen his last BM was. He reports being hungry and repeatedly asks for food but does not provide and other history. In the ED, the patient's PEG tube was placed to gravity with ~50 cc of coffee ground output.     Hemoglobin A1C   Date Value Ref Range Status   02/06/2020 4.7 4.0 - 5.6 % Final     Comment:     ADA Screening Guidelines:  5.7-6.4%  Consistent with prediabetes  >or=6.5%  Consistent with diabetes  High levels of fetal hemoglobin interfere with the HbA1C  assay. Heterozygous hemoglobin variants (HbS, HgC, etc)do  not significantly interfere with this assay.   However, presence of multiple variants may affect accuracy.     11/09/2019 4.0 4.0 - 5.6 % Final     Comment:     ADA Screening Guidelines:  5.7-6.4%  Consistent with prediabetes  >or=6.5%  Consistent with diabetes  High levels of fetal hemoglobin interfere with the HbA1C  assay. Heterozygous hemoglobin variants (HbS, HgC, etc)do  not significantly interfere with this assay.   However, presence of multiple variants may  affect accuracy.     06/22/2019 4.7 4.0 - 5.6 % Final     Comment:     ADA Screening Guidelines:  5.7-6.4%  Consistent with prediabetes  >or=6.5%  Consistent with diabetes  High levels of fetal hemoglobin interfere with the HbA1C  assay. Heterozygous hemoglobin variants (HbS, HgC, etc)do  not significantly interfere with this assay.   However, presence of multiple variants may affect accuracy.     06/22/2019 4.7 4.0 - 5.6 % Final     Comment:     ADA Screening Guidelines:  5.7-6.4%  Consistent with prediabetes  >or=6.5%  Consistent with diabetes  High levels of fetal hemoglobin interfere with the HbA1C  assay. Heterozygous hemoglobin variants (HbS, HgC, etc)do  not significantly interfere with this assay.   However, presence of multiple variants may affect accuracy.         Past Medical History: Patient has a past medical history of Amputation stump pain (9/10/2013), Aspiration pneumonia (7/27/2015), Asterixis (11/8/2016), C. difficile colitis (8/7/2015), Cholelithiasis without obstruction (8/25/2015), Chronic diastolic heart failure, Chronic low back pain (12/1/2015), Closed head injury (9/8/2016), DVT (deep venous thrombosis) (7/28/2017), ESRD on hemodialysis (2/7/2013), GERD (gastroesophageal reflux disease), HCV antibody positive, Hemiparesis affecting left side as late effect of stroke (11/08/2016), History of Intracerebral Hemorrhage: L BG 5/2013; R BG 9/2016; R BG 11/2016; L caudate head 2/2017 (11/2/2016), Hypertension, left basal ganglia ICH 5/2013 (11/2/2016), Left Caudate Head ICH 2/22/2017 (2/24/2017), 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    Past Surgical  History: Patient has a past surgical history that includes R AVF (9/12/12); Leg amputation through knee (12/18/2013); Foot amputation through metatarsal; Colonoscopy; Colonoscopy (N/A, 4/4/2017); Esophagogastroduodenoscopy (N/A, 6/12/2018); Upper gastrointestinal endoscopy; Esophagogastroduodenoscopy (N/A, 3/7/2019); Esophagogastroduodenoscopy (N/A, 9/23/2019); Esophagogastroduodenoscopy (N/A, 10/2/2019); Esophagogastroduodenoscopy (N/A, 11/12/2019); and Esophagogastroduodenoscopy (N/A, 2/14/2020).    Social History: Patient reports that he has quit smoking. He has a 10.00 pack-year smoking history. He has never used smokeless tobacco. He reports that he does not drink alcohol or use drugs.    Family History: family history includes Alcohol abuse in his maternal grandmother; Diabetes in his brother and maternal grandfather; Early death in his mother; Heart disease in his father; Hyperlipidemia in his father; Hypertension in his father and sister; Kidney disease in his father.    Medications: reviewed     Allergies: Patient is allergic to fosrenol [lanthanum].    ROS  Pain Scale: 0 /10   Constitutional: no fever or chills  Respiratory: no cough or shortness of breath  Cardiovascular: no chest pain or palpitations  Gastrointestinal:Positive for coffee ground emesis, no abdominal pain or change in bowel habits  Genitourinary: no hematuria or dysuria  Integument/Breast: no rash or pruritis  Hematologic/Lymphatic: no easy bruising or lymphadenopathy  Musculoskeletal: no arthralgias or myalgias  Neurological: no seizures or tremors  Behavioral/Psych: no depression or anxiety    PEx  Temp:  [98.2 °F (36.8 °C)-98.6 °F (37 °C)]   Pulse:  [106-110]   Resp:  [20-22]   BP: (149-182)/()   SpO2:  [99 %-100 %]   There is no height or weight on file to calculate BMI.     Intake/Output Summary (Last 24 hours) at 2/19/2020 9427  Last data filed at 2/19/2020 2274  Gross per 24 hour   Intake 625 ml   Output 300 ml   Net 325 ml        General appearance: no distress, pt resting comfortably  Mental status: Alert and oriented x 3  HEENT:  conjunctivae/corneas clear, PERRL  Neck: supple, thyroid not enlarged  Pulm:   normal respiratory effort, CTA B, no c/w/r  Card: RRR, S1, S2 normal, no murmur, click, rub or gallop  Abd: soft, NT, ND, BS present; PEG tube in place to gravity, C/D/I  Ext: no c/c/e  Pulses: 2+, symmetric  Skin: color, texture, turgor normal. No rashes or lesions  Neuro: CN II-XII grossly intact, no focal numbness or weakness, normal strength and tone     Recent Results (from the past 24 hour(s))   POCT glucose    Collection Time: 02/19/20  7:34 PM   Result Value Ref Range    POCT Glucose 74 70 - 110 mg/dL   CBC auto differential    Collection Time: 02/19/20  7:41 PM   Result Value Ref Range    WBC 6.17 3.90 - 12.70 K/uL    RBC 3.80 (L) 4.60 - 6.20 M/uL    Hemoglobin 10.4 (L) 14.0 - 18.0 g/dL    Hematocrit 34.3 (L) 40.0 - 54.0 %    Mean Corpuscular Volume 90 82 - 98 fL    Mean Corpuscular Hemoglobin 27.4 27.0 - 31.0 pg    Mean Corpuscular Hemoglobin Conc 30.3 (L) 32.0 - 36.0 g/dL    RDW 15.9 (H) 11.5 - 14.5 %    Platelets 284 150 - 350 K/uL    MPV 9.6 9.2 - 12.9 fL    Immature Granulocytes 0.6 (H) 0.0 - 0.5 %    Gran # (ANC) 4.4 1.8 - 7.7 K/uL    Immature Grans (Abs) 0.04 0.00 - 0.04 K/uL    Lymph # 1.0 1.0 - 4.8 K/uL    Mono # 0.5 0.3 - 1.0 K/uL    Eos # 0.2 0.0 - 0.5 K/uL    Baso # 0.03 0.00 - 0.20 K/uL    nRBC 0 0 /100 WBC    Gran% 71.1 38.0 - 73.0 %    Lymph% 16.4 (L) 18.0 - 48.0 %    Mono% 8.6 4.0 - 15.0 %    Eosinophil% 2.8 0.0 - 8.0 %    Basophil% 0.5 0.0 - 1.9 %    Differential Method Automated    Comprehensive metabolic panel    Collection Time: 02/19/20  7:41 PM   Result Value Ref Range    Sodium 143 136 - 145 mmol/L    Potassium 3.5 3.5 - 5.1 mmol/L    Chloride 100 95 - 110 mmol/L    CO2 30 (H) 23 - 29 mmol/L    Glucose 78 70 - 110 mg/dL    BUN, Bld 12 6 - 20 mg/dL    Creatinine 3.8 (H) 0.5 - 1.4 mg/dL    Calcium  10.1 8.7 - 10.5 mg/dL    Total Protein 10.1 (H) 6.0 - 8.4 g/dL    Albumin 2.9 (L) 3.5 - 5.2 g/dL    Total Bilirubin 0.5 0.1 - 1.0 mg/dL    Alkaline Phosphatase 208 (H) 55 - 135 U/L    AST 25 10 - 40 U/L    ALT 8 (L) 10 - 44 U/L    Anion Gap 13 8 - 16 mmol/L    eGFR if African American 19.4 (A) >60 mL/min/1.73 m^2    eGFR if non  16.8 (A) >60 mL/min/1.73 m^2   Lipase    Collection Time: 02/19/20  7:41 PM   Result Value Ref Range    Lipase 25 4 - 60 U/L   APTT    Collection Time: 02/19/20  7:41 PM   Result Value Ref Range    aPTT 25.7 21.0 - 32.0 sec   Protime-INR    Collection Time: 02/19/20  7:41 PM   Result Value Ref Range    Prothrombin Time 11.5 9.0 - 12.5 sec    INR 1.1 0.8 - 1.2   Type & Screen    Collection Time: 02/19/20  7:41 PM   Result Value Ref Range    Group & Rh A POS     Indirect Marci NEG    POCT glucose    Collection Time: 02/19/20  9:13 PM   Result Value Ref Range    POCT Glucose 114 (H) 70 - 110 mg/dL       Recent Labs   Lab 02/17/20  1809 02/18/20  0003 02/18/20  0549 02/18/20  1119 02/19/20  1934 02/19/20  2113   POCTGLUCOSE 93 149* 87 127* 74 114*       Active Hospital Problems    Diagnosis  POA    Gastric outlet obstruction [K31.1]  Yes      Resolved Hospital Problems   No resolved problems to display.     Assessment and Plan:  Coffee Ground Emesis  Hx of GI Bleed  Oropharyngeal dysphagia  -Pt. Reported to have coffee ground emesis, has history of chronic GI bleeding. Also diagnosed with oropharyngeal dysphagia on last admit requiring PEG tube placement  -Vitals stable, Hgb actually increased from prior. Continue to monitor Hgb with goal transfusion rate>7  -Discussed case with GI in ED, will start IV PPI BID, keep pt. NPO. Hold PO medications for now  -Concern for gastric outlet obstruction causing vomiting rather than true hematemesis given pt. dysphagia with recent PEG tube placement. CT/abdomen negative for SBO but shows moderate volume stool in the colon with rectal  thickening 2/2 to constipation. Pt. Uncertain of last BM    Seizures  -Concern for seizure like activity on last admit  -Continue keppra 250mg BID per neuro recs    Renovascular hypertension  -Optimal off meds per discharge summary. Elevated in Ed, however  -IV hydralazine ordered PRN, continue to monitor    ESRD on HD:  -Pt undergoes HD MWF outpatient  -last HD session today per patient  -Consulted nephrology to continue HD inpatient      Chronic diastolic heart failure  -Last echo done at [facility] 8/2/19, EF>55%, bi-atrial enlargement  -No signs of volume overload, no acute issues     Severe malnutrition:  -NPO as above, holding feeds  -Pt. Was tolerating PO intake by end of stay last admit, may need repeat speech eval after GI work-up      DVT PPx: SCDs    Blake Sparks MD  Hospital Medicine Staff  [phone] pager     [facility]

## 2020-02-20 NOTE — PLAN OF CARE
CM at bedside patient not oriented, information gathered from chart. Patient is a resident of [facility]. Patient is to be discharged back to nursing home facility once medically stable. CM name and number on board.     PCP: Cori Randall MD      Payor: MEDICARE / Plan: MEDICARE PART A & B / Product Type: [facility] / 02/20/20 1420   Discharge Assessment   Assessment Type Discharge Planning Assessment   Confirmed/corrected address and phone number on facesheet? No   Assessment information obtained from? Medical Record   Expected Length of Stay (days) 3   Communicated expected length of stay with patient/caregiver no   Prior to hospitilization cognitive status: Not Oriented to Place;Not Oriented to Time   Prior to hospitalization functional status: Wheelchair Bound   Current cognitive status: Not Oriented to Place;Not Oriented to Time   Current Functional Status: Wheelchair Bound   Facility Arrived From: [facility]    Lives With facility resident   Able to Return to Prior Arrangements yes   Is patient able to care for self after discharge? No   Who are your caregiver(s) and their phone number(s)? Alicia Retana sister [phone]   Patient's perception of discharge disposition nursing [place]   Patient currently being followed by outpatient case management? No   Patient currently receives any other outside agency services? No   Equipment Currently Used at Home hospital bed;walker, standard   Do you have any problems affording any of your prescribed medications? No   Is the patient taking medications as prescribed? yes   Does the patient have transportation home? Yes   Transportation Anticipated health plan transportation   Dialysis Name and Scheduled days [facility]    Does the patient receive services at the [facility]? No   Discharge Plan A Skilled Nursing Facility   Discharge Plan B Return to Nursing Home   DME Needed Upon Discharge  none   Patient/Family in Agreement with Plan  yes       Kassy Garvin RN, BSN     Ext [phone]

## 2020-02-20 NOTE — HPI
The patient is a 54 year-old AA male with a history of multiple instances of GI bleed (just discharged yesterday underwent EGD while admitted 2/14), ESRD on dialysis MWF, L below knee amputation 2/2 osteomyelitis, HFpEF, GERD, and h/o multiple ICH w/o residual deficits presents to the ED from [facility] after he was noted to have coffee ground emesis. The patient was recently hospitalized from 1/20/2020-2/18/2020 for acute encephalopathy, seizures which resolved, aspiration pneumonia which resolved, hematemesis, and severe malnutrition now s/p a PEG tube placement. The patient was sent to [facility] on 2/18 for PT and OT rehab to improve functional status and ability to perform ADLs. He returned to the ED the following day with the aforementioned complaints. The patient refuses to answer any direct questions on assessment this AM. Unsure if the patient dialyze outpatient on yesterday. However, no acute metabolic derangements noted on AM lab work. Patient appears comfortable on room air. The patient was previously a MWF dialysis patient at [facility] under the direction of Dr. Lemus. Nephrology consulted for management of ESRD and HD treatment.

## 2020-02-20 NOTE — ED PROVIDER NOTES
Encounter Date: 2/19/2020       History     Chief Complaint   Patient presents with    Emesis     coffee ground emesis, was given 8mg zofran at 3pm discharged yesterday, from [facility]     Pt is a 55 yo M with PMH of  L BKA, C diff, dCHF, DVT, ESRD on dialysis, hepatitis-C, latent TB, gastroparesis, GI bleed, seizures who presents with vomiting from [facility] via EMS.  Patient was discharged from this hospital yesterday.  Pt tells me that he started vomiting again last night. Patient denies abdominal pain, fever, chills. He states he normally walks using a prosthetic device to his left below the knee amputation. Per EMS report, pt started vomiting coffee grounds emesis today and was given Zofran 8 mg without relief. Pt does have a PEG tube but reports he does eat a soft diet by mouth. Patient tells me he had dialysis yesterday although normally he is MWF. Pt does still make some urine.  History is limited due to patient being a poor historian.    Per chart review pt was hospitalized from 1/20/2020-2/18/2020 for acute encephalopathy, seizures which resolved, aspiration pneumonia which resolved, hematemesis, had a PEG tube placed, severe malnutrition    The history is provided by the patient and medical records.     Review of patient's allergies indicates:   Allergen Reactions    Fosrenol [lanthanum] Nausea And Vomiting     Nausea and vomiting     Past Medical History:   Diagnosis Date    Amputation stump pain 9/10/2013    Aspiration pneumonia 7/27/2015    Asterixis 11/8/2016    C. difficile colitis 8/7/2015    Cholelithiasis without obstruction 8/25/2015    Chronic diastolic heart failure     2-23-17   1 - Low normal to mildly depressed left ventricular systolic function (EF 50-55%).    2 - Right ventricular enlargement with mildly depressed systolic function.    3 - Left ventricular diastolic dysfunction.    4 - Right atrial enlargement.    5 - Severe tricuspid regurgitation.    6  - Pulmonary hypertension. The estimated PA systolic pressure is 86 mmHg.    7 - Increased central venous pressure.    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    Metabolic acidosis, IAG, reduced excretion of inorganic acids     Myoclonic jerking 9/20/2016    Noncompliance with medication regimen 12/4/2018    Secondary hyperparathyroidism (of renal origin)     Secondary pulmonary hypertension 3/23/2017    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    TB lung, latent 08/25/2015    Negative Quantiferon Gold 3-23-17     Past Surgical History:   Procedure Laterality Date    COLONOSCOPY      COLONOSCOPY N/A 4/4/2017    Procedure: COLONOSCOPY;  Surgeon: Walker Stern MD;  [facility]  Service: Endoscopy;  Laterality: N/A;  PA Systolic Pressure 85.56. HD Patient MWF, K+ lab prior to procedure.     ESOPHAGOGASTRODUODENOSCOPY N/A 6/12/2018    Procedure: EGD (ESOPHAGOGASTRODUODENOSCOPY);  Surgeon: Man Galicia MD;  Location: [facility] (2ND FLR);  Service: Endoscopy;  Laterality: N/A;  EGD in 8-12 weeks with Dr. Galicia on 4th floor for follow up erosive esophagitis and Mejia's surveillance.    Patient should be on Pantoprazole 40mg every 12 hours or the equivulant of another PPI    awaiting for patient to reply back regarding changing    ESOPHAGOGASTRODUODENOSCOPY N/A 3/7/2019    Procedure: EGD  (ESOPHAGOGASTRODUODENOSCOPY);  Surgeon: Man Galicia MD;  Location: [facility] (2ND FLR);  Service: Endoscopy;  Laterality: N/A;    ESOPHAGOGASTRODUODENOSCOPY N/A 9/23/2019    Procedure: EGD (ESOPHAGOGASTRODUODENOSCOPY);  Surgeon: Keanu Rainey MD;  Location: [facility] (2ND FLR);  Service: Endoscopy;  Laterality: N/A;    ESOPHAGOGASTRODUODENOSCOPY N/A 10/2/2019    Procedure: EGD (ESOPHAGOGASTRODUODENOSCOPY);  Surgeon: Kevin De La Paz MD;  Location: [facility] (2ND FLR);  Service: Endoscopy;  Laterality: N/A;    ESOPHAGOGASTRODUODENOSCOPY N/A 11/12/2019    Procedure: EGD (ESOPHAGOGASTRODUODENOSCOPY);  Surgeon: Kevin De La Paz MD;  Location: [facility] (2ND FLR);  Service: Endoscopy;  Laterality: N/A;    ESOPHAGOGASTRODUODENOSCOPY N/A 2/14/2020    Procedure: EGD (ESOPHAGOGASTRODUODENOSCOPY);  Surgeon: Kevin De La Paz MD;  Location: [facility] (2ND FLR);  Service: Endoscopy;  Laterality: N/A;    FOOT AMPUTATION THROUGH METATARSAL      left foot    LEG AMPUTATION THROUGH KNEE  12/18/2013    left BKA    R AVF  9/12/12    UPPER GASTROINTESTINAL ENDOSCOPY       Family History   Problem Relation Age of Onset    Early death Mother     Kidney disease Father     Hypertension Father     Heart disease Father     Hyperlipidemia Father     Diabetes Brother     Alcohol abuse Maternal Grandmother     Diabetes Maternal Grandfather     Hypertension Sister     Melanoma Neg Hx      Social History     Tobacco Use    Smoking status: Former Smoker     Packs/day: 1.00     Years: 10.00     Pack years: 10.00    Smokeless tobacco: Never Used   Substance Use Topics    Alcohol use: No    Drug use: No     Review of Systems   Constitutional: Negative for chills and fever.   HENT: Negative for congestion.    Eyes: Negative for visual disturbance.   Respiratory: Negative for shortness of breath.    Cardiovascular: Negative for chest pain.   Gastrointestinal: Positive for vomiting. Negative for abdominal pain, diarrhea  and nausea.   Genitourinary: Negative for dysuria.   Musculoskeletal: Negative for back pain.   Skin: Negative for rash.   Allergic/Immunologic: Negative for immunocompromised state.   Neurological: Negative for headaches.   Hematological: Does not bruise/bleed easily.   Psychiatric/Behavioral: The patient is not nervous/anxious.        Physical Exam     Initial Vitals   BP Pulse Resp Temp SpO2   02/19/20 1817 02/19/20 1817 02/19/20 1817 02/19/20 1819 02/19/20 1817   (!) 149/91 106 (!) 22 98.6 °F (37 °C) 99 %      MAP       --                Physical Exam    Nursing note and vitals reviewed.  Constitutional: He appears well-developed and well-nourished.   HENT:   Head: Normocephalic and atraumatic.   Eyes: EOM are normal.   Neck: Neck supple.   Cardiovascular: Normal rate, regular rhythm and intact distal pulses.   Pulmonary/Chest: No respiratory distress.   Abdominal: Soft. He exhibits no distension. There is no tenderness. There is no guarding.   Musculoskeletal: Normal range of motion.   Left BKA    Neurological: He is alert and oriented to person, place, and time. GCS score is 15. GCS eye subscore is 4. GCS verbal subscore is 5. GCS motor subscore is 6.   Skin: Skin is warm and dry.   Psychiatric: He has a normal mood and affect. His behavior is normal. Judgment and thought content normal.         ED Course   Procedures  Labs Reviewed   CBC W/ AUTO DIFFERENTIAL - Abnormal; Notable for the following components:       Result Value    RBC 3.80 (*)     Hemoglobin 10.4 (*)     Hematocrit 34.3 (*)     Mean Corpuscular Hemoglobin Conc 30.3 (*)     RDW 15.9 (*)     Immature Granulocytes 0.6 (*)     Lymph% 16.4 (*)     All other components within normal limits   COMPREHENSIVE METABOLIC PANEL - Abnormal; Notable for the following components:    CO2 30 (*)     Creatinine 3.8 (*)     Total Protein 10.1 (*)     Albumin 2.9 (*)     Alkaline Phosphatase 208 (*)     ALT 8 (*)     eGFR if  19.4 (*)     eGFR if  non  16.8 (*)     All other components within normal limits   POCT GLUCOSE - Abnormal; Notable for the following components:    POCT Glucose 114 (*)     All other components within normal limits   LIPASE   APTT   PROTIME-INR   LACTIC ACID, PLASMA   LEVETIRACETAM  (KEPPRA) LEVEL   LEVETIRACETAM  (KEPPRA) LEVEL   COMPREHENSIVE METABOLIC PANEL   CBC W/ AUTO DIFFERENTIAL   TYPE & SCREEN   POCT GLUCOSE   POCT GLUCOSE MONITORING CONTINUOUS     EKG Readings: (Independently Interpreted)   Initial Reading: No STEMI. Previous EKG: Compared with most recent EKG Previous EKG Date: 02/09/2020.   7:30 p.m. sinus tachycardia rate of 117 left axis deviation compared with EKG 02/09/2020 heart rate has increased otherwise no significant change       Imaging Results          CT Abdomen Pelvis With Contrast (Final result)  Result time 02/20/20 00:53:08    Final result by Byron Maynard MD (02/20/20 00:53:08)                 Impression:      Moderate volume stool in the colon with rectal wall thickening, possibly secondary to constipation and/or stercoral colitis.  Rectal mass could have a similar appearance, and short-term follow-up with endoscopy is recommended in this patient with history of GI bleeding to exclude malignancy.    Symmetric thickening of the urinary bladder wall which could be due to relative nondistention, cystitis, or bladder outlet obstruction in the setting of prostatomegaly.  Correlation with urinalysis recommended.    Cholelithiasis. No evidence of cholecystitis    Small hiatal hernia and gastrostomy tube in place.  Mild lower esophageal wall thickening which could reflect a nonspecific esophagitis.    Atrophic, cystic kidneys in keeping with end-stage renal disease.    Age-advanced calcific atherosclerosis of the aorta and branch vessels.    Additional findings detailed above.    Electronically signed by resident: Jerad Veras MD  Date:    02/19/2020  Time:    23:50    Electronically  signed by: Byron Maynard MD  Date:    02/20/2020  Time:    00:53             Narrative:    EXAMINATION:  CT ABDOMEN PELVIS WITH CONTRAST    CLINICAL HISTORY:  Abdominal distension;GI bleeding;Bowel obstruction, high-grade;    TECHNIQUE:  The patient was surveyed from the lung bases through the pelvis after the administration of 75 cc Omni 350 IV contrast as well 30 cc Omnipaque 350 oral contrast and data was reconstructed for coronal, sagittal, and axial images.    COMPARISON:  CT chest abdomen non coronary 09/02/2019.  CT abdomen and pelvis, 04/03/2018.    FINDINGS:  The heart is normal in size.  There is no pericardial effusion.  Mild lower esophageal wall thickening.    The lung bases are symmetrically expanded.  Detailed evaluation of the pulmonary parenchyma is limited by respiratory motion.  There is a calcified granuloma in the anteromedial basal segment left lower lobe.  No consolidation, pleural thickening, pleural fluid, or pneumothorax.    Evaluation of the upper abdomen is limited by motion degradation.    Liver is normal in size and attenuation with no focal hepatic abnormality identified.  Large calcified gallstones are present in the gallbladder.  No gallbladder wall thickening or pericholecystic fluid.  No evidence of intra or extrahepatic biliary ductal dilatation.    There is a small hiatal hernia and a gastrostomy tube in place.  The spleen, pancreas, and adrenal glands are unremarkable.    The kidneys are normal in location.  There is bilateral cortical thinning and numerous parenchymals cysts in keeping with end-stage renal disease.  There bilateral vascular calcifications versus nonobstructing nephroliths.  No hydronephrosis identified.  The urinary bladder demonstrates concentric wall thickening.  The prostate is enlarged.    The abdominal aorta is normal in course and caliber with age-advanced calcific atherosclerosis extending into the branch vessels.    Moderate volume stool in the colon.   Residual contrast is present in the descending colon and rectum from prior enteric contrast administration.  There is nonspecific wall thickening involving the lower rectum.  No small bowel obstruction.  There is no abdominopelvic ascites, intraperitoneal free air or bulky abdominopelvic lymphadenopathy.    Osseous structures demonstrate age-appropriate degenerative change, and are diffusely sclerotic suggestive of renal osteodystrophy.  Multiple Schmorl's nodes noted throughout the thoracolumbar spine.  No acute fracture, focal lytic or sclerotic lesion identified.  The extraperitoneal soft tissues reveal nothing unusual.                               X-Ray Abdomen Flat And Erect (Final result)  Result time 02/19/20 21:53:31    Final result by Byron Maynard MD (02/19/20 21:53:31)                 Impression:      Nonobstructive bowel gas pattern.    Incidental findings discussed in the body of the report.      Electronically signed by: Byron Maynard MD  Date:    02/19/2020  Time:    21:53             Narrative:    EXAMINATION:  XR ABDOMEN FLAT AND ERECT    CLINICAL HISTORY:  Vomiting, unspecified    TECHNIQUE:  Flat and erect frontal views of the abdomen were performed.    COMPARISON:  02/11/2020.    FINDINGS:  Cardiac wires overlie the abdomen and pelvis.  Retained contrast material is present in the descending colon and rectum.  Percutaneous gastrostomy tube overlies the stomach.  Bowel gas pattern is nonobstructive.  No large volume stool burden.  Round calcification overlies the right upper quadrant of the abdomen, likely a large gallstone.  Bones are demineralized.                               X-Ray Chest AP Portable (Final result)  Result time 02/19/20 20:35:52    Final result by Wade Edward MD (02/19/20 20:35:52)                 Impression:      1. Since the previous examination, there is been interval improvement of the patchy interstitial and parenchymal abnormalities as described on  previous exam.  There may be residual congestive change/edema noting no new large focal consolidation.  Correlation is advised.      Electronically signed by: Wade Edward MD  Date:    02/19/2020  Time:    20:35             Narrative:    EXAMINATION:  XR CHEST AP PORTABLE    CLINICAL HISTORY:  vomiting;    TECHNIQUE:  Single frontal view of the chest was performed.    COMPARISON:  02/12/2020    FINDINGS:  The cardiomediastinal silhouette is not enlarged, stable as compared to the previous exam.  Right vascular stent noted..  There is no pleural effusion.  The trachea is midline.  The lungs are symmetrically expanded bilaterally with mildly coarse central hilar interstitial attenuation, improved since the previous exam.  No large focal consolidation seen.  There is no pneumothorax.  The osseous structures are remarkable for degenerative changes..                                 Medical Decision Making:   History:   Old Medical Records: I decided to obtain old medical records.  Old Records Summarized: other records.       <> Summary of Records: EGD 2/14/2020  Impression:           - Normal examined duodenum.                        - Non-bleeding erosive gastropathy.                        - 2 cm hiatal hernia. Biopsied.                        - Intact gastrostomy with a patent G-tube present.                     Initial Assessment:   Pt with black, bloody emesis, placed PEG tube to gravity with > 300 ml of black bloody fluid  Has a history of gastroparesis, esophagitis, and GI hemorrhage, recent EGD showing gastric erosions but with stigmata of recent bleeding  Pt not sure when his last BM was  CT to further eval for possible obstruction including malignancy  Differential Diagnosis:   SBO, malignancy, PUD  Clinical Tests:   Lab Tests: Ordered and Reviewed  Radiological Study: Ordered and Reviewed  Medical Tests: Ordered and Reviewed  ED Management:  Pt was tachycardic upon arrival with poor po intake due to  vomiting, given 500 ml bolus of NS (is ESRD so will need limits to amt of IV fluids given) borderline hypoglycemia so D10 also given  given Protonix 40 mg iv for GI bleeding, consulted GI who recommended pt remain NPO, Protonix BID  H/H currently stable  CT scan showing possible colitis, malignancy, constipation, likely needs further evaluation with colonoscopy  Has seizure disorder and not tolerating po and has signs of obstruction or gastroparesis, will check Keppra level and give dose of keppra iv  Discussed with hospital medicine who will accept for admission  Other:   I have discussed this case with another health care provider.              Attending Attestation:         Attending Critical Care:   Critical Care Times:   ==============================================================  · Total Critical Care Time - exclusive of procedural time: 35 minutes.  ==============================================================  Critical care was necessary to treat or prevent imminent or life-threatening deterioration of the following conditions: GI bleeding.   Critical care was time spent personally by me on the following activities: obtaining history from patient or relative, examination of patient, review of old charts, ordering lab, x-rays, and/or EKG, development of treatment plan with patient or relative, evaluation of patient's response to treatment, discussion with consultants and re-evaluation of patient's conition.   Critical Care Condition: potentially life-threatening               ED Course as of Feb 20 0131   Wed Feb 19, 2020 2227 I discussed with GI they recommend keeping the patient NPO making sure he is on a PPI b.i.d. and they will consult on the patient.  Will keep PEG tube to gravity    [GK]      ED Course User Index  [GK] Gabrielle Mahan MD                Clinical Impression:       ICD-10-CM ICD-9-CM   1. Vomiting R11.10 787.03   2. Gastrointestinal hemorrhage, unspecified gastrointestinal  hemorrhage type K92.2 578.9                             Gabrielle Mahan MD  02/20/20 0131

## 2020-02-20 NOTE — PROGRESS NOTES
[facility] Hospital Medicine                                                                     Progress Note     Team: [facility] HOSP MED G Jaskaran Colon MD   Admit Date: 2/19/2020   Hospital Day: 1  JUAN:  2/21/2020  Code status: Full Code   Principal Problem: Hematemesis     SUMMARY:     53 yo M with hx of recurrent UGIB (recently discharged and underwent EGD while admitted 2/14), Gastroparesis, S/p PEG, ESRD on dialysis MWF, L below knee amputation 2/2 osteomyelitis, HFpEF, GERD, and h/o multiple ICH w/o residual deficits presents to the ED from SNF after he was noted to have coffee ground emesis. In the ED, the patient's PEG tube was placed to gravity with ~50 cc of coffee ground output. Admitted to hospital medicine for reported coffee ground emesis as per EMS/SNF. Hgb appears stable, and note patient recently had a scope done by GI (2/14) with showed non bleeding erosive gastropathy, and hiatal hernia. Discussed case with GI in ED, will start IV PPI BID and kept NPO. GI seen patient and since had EGD done recently with stable H/H, no need for EGD/colonosocopy at this time. Suspecting symptoms from ongoing gastroparesis and erosive gastropathy. GI rec conservative treatment with observation, antiemetics, IVF and Protonix.    SUBJECTIVE:     Pt was seen and examined at bedside. Pt had no acute events overnight, and no new complaints this morning. Pt remained hemodynamically stable and afebrile. This patient is well known to me. Mentation at baseline, drowsy, but easily arousal and answers all questions. No complaints this AM. Denies nausea or abdomina/ pain. GI has evaluated maria ines     ROS (Positive in Bold, otherwise negative)  Pain Scale: 0 /10   Constitutional: fever, chills, night sweats  CV: chest pain, edema, palpitations  Resp: SOB, cough, sputum  production  GI: changes in appetite, nausea and emesis (resolved), NVDC, pain, melena, hematochezia, GERD, hematemesis  : Dysuria, hematuria, urinary urgency, frequency  MSK: arthralgia/myalgia, joint swelling  SKIN: rashes, pruritis, petechiae   Neuro/Psych: FND, anxiety, depression      OBJECTIVE:     Vitals:  Temp:  [97.9 °F (36.6 °C)-98.6 °F (37 °C)]   Pulse:  [101-110]   Resp:  [14-22]   BP: (105-191)/()   SpO2:  [96 %-100 %]      I & O (Last 24H):     Intake/Output Summary (Last 24 hours) at 2/20/2020 1120  Last data filed at 2/20/2020 0054  Gross per 24 hour   Intake 666.67 ml   Output 350 ml   Net 316.67 ml       General:  male  in no acute distress. Alert. Appears chronically ill. Resting in bed. Cooperative.  Aox3  HEENT: NCAT. PERRL. EOMI. Sclera Anicteric. NG tube in place  CVS: RRR. Normal S1 S2. No murmurs  Pulm: CTAB. Normal respiratory effort. No wheezes, rhonchi, or crackles.  Abdomen: Soft. Non-distended. No tenderness to palpation. No rebound or guarding. +BS. PEG in place.  Extremities: No edema. No cyanosis. Full ROM.  Globally weak. Left BKA  Neuro: Alert, oriented x 3, Spont mvt of all extremities with no focal deficits noted. Globally weak.  Able to answer questions appropriately and follow simple commands.    All recent labs and imaging has been reviewed.     Recent Results (from the past 24 hour(s))   POCT glucose    Collection Time: 02/19/20  7:34 PM   Result Value Ref Range    POCT Glucose 74 70 - 110 mg/dL   CBC auto differential    Collection Time: 02/19/20  7:41 PM   Result Value Ref Range    WBC 6.17 3.90 - 12.70 K/uL    RBC 3.80 (L) 4.60 - 6.20 M/uL    Hemoglobin 10.4 (L) 14.0 - 18.0 g/dL    Hematocrit 34.3 (L) 40.0 - 54.0 %    Mean Corpuscular Volume 90 82 - 98 fL    Mean Corpuscular Hemoglobin 27.4 27.0 - 31.0 pg    Mean Corpuscular Hemoglobin Conc 30.3 (L) 32.0 - 36.0 g/dL    RDW 15.9 (H) 11.5 - 14.5 %    Platelets 284 150 - 350 K/uL    MPV 9.6 9.2 - 12.9  fL    Immature Granulocytes 0.6 (H) 0.0 - 0.5 %    Gran # (ANC) 4.4 1.8 - 7.7 K/uL    Immature Grans (Abs) 0.04 0.00 - 0.04 K/uL    Lymph # 1.0 1.0 - 4.8 K/uL    Mono # 0.5 0.3 - 1.0 K/uL    Eos # 0.2 0.0 - 0.5 K/uL    Baso # 0.03 0.00 - 0.20 K/uL    nRBC 0 0 /100 WBC    Gran% 71.1 38.0 - 73.0 %    Lymph% 16.4 (L) 18.0 - 48.0 %    Mono% 8.6 4.0 - 15.0 %    Eosinophil% 2.8 0.0 - 8.0 %    Basophil% 0.5 0.0 - 1.9 %    Differential Method Automated    Comprehensive metabolic panel    Collection Time: 02/19/20  7:41 PM   Result Value Ref Range    Sodium 143 136 - 145 mmol/L    Potassium 3.5 3.5 - 5.1 mmol/L    Chloride 100 95 - 110 mmol/L    CO2 30 (H) 23 - 29 mmol/L    Glucose 78 70 - 110 mg/dL    BUN, Bld 12 6 - 20 mg/dL    Creatinine 3.8 (H) 0.5 - 1.4 mg/dL    Calcium 10.1 8.7 - 10.5 mg/dL    Total Protein 10.1 (H) 6.0 - 8.4 g/dL    Albumin 2.9 (L) 3.5 - 5.2 g/dL    Total Bilirubin 0.5 0.1 - 1.0 mg/dL    Alkaline Phosphatase 208 (H) 55 - 135 U/L    AST 25 10 - 40 U/L    ALT 8 (L) 10 - 44 U/L    Anion Gap 13 8 - 16 mmol/L    eGFR if African American 19.4 (A) >60 mL/min/1.73 m^2    eGFR if non  16.8 (A) >60 mL/min/1.73 m^2   Lipase    Collection Time: 02/19/20  7:41 PM   Result Value Ref Range    Lipase 25 4 - 60 U/L   APTT    Collection Time: 02/19/20  7:41 PM   Result Value Ref Range    aPTT 25.7 21.0 - 32.0 sec   Protime-INR    Collection Time: 02/19/20  7:41 PM   Result Value Ref Range    Prothrombin Time 11.5 9.0 - 12.5 sec    INR 1.1 0.8 - 1.2   Type & Screen    Collection Time: 02/19/20  7:41 PM   Result Value Ref Range    Group & Rh A POS     Indirect Marci NEG    POCT glucose    Collection Time: 02/19/20  9:13 PM   Result Value Ref Range    POCT Glucose 114 (H) 70 - 110 mg/dL   Lactic acid, plasma    Collection Time: 02/19/20 11:14 PM   Result Value Ref Range    Lactate (Lactic Acid) 1.1 0.5 - 2.2 mmol/L   Comprehensive Metabolic Panel (CMP)    Collection Time: 02/20/20  4:42 AM   Result Value  Ref Range    Sodium 139 136 - 145 mmol/L    Potassium 3.8 3.5 - 5.1 mmol/L    Chloride 103 95 - 110 mmol/L    CO2 23 23 - 29 mmol/L    Glucose 62 (L) 70 - 110 mg/dL    BUN, Bld 15 6 - 20 mg/dL    Creatinine 4.3 (H) 0.5 - 1.4 mg/dL    Calcium 9.2 8.7 - 10.5 mg/dL    Total Protein 8.8 (H) 6.0 - 8.4 g/dL    Albumin 2.5 (L) 3.5 - 5.2 g/dL    Total Bilirubin 0.5 0.1 - 1.0 mg/dL    Alkaline Phosphatase 173 (H) 55 - 135 U/L    AST 21 10 - 40 U/L    ALT 6 (L) 10 - 44 U/L    Anion Gap 13 8 - 16 mmol/L    eGFR if African American 16.7 (A) >60 mL/min/1.73 m^2    eGFR if non African American 14.5 (A) >60 mL/min/1.73 m^2   Magnesium    Collection Time: 02/20/20  4:42 AM   Result Value Ref Range    Magnesium 2.1 1.6 - 2.6 mg/dL   Phosphorus    Collection Time: 02/20/20  4:42 AM   Result Value Ref Range    Phosphorus 3.2 2.7 - 4.5 mg/dL   CBC with Automated Differential    Collection Time: 02/20/20  4:43 AM   Result Value Ref Range    WBC 4.52 3.90 - 12.70 K/uL    RBC 3.83 (L) 4.60 - 6.20 M/uL    Hemoglobin 10.7 (L) 14.0 - 18.0 g/dL    Hematocrit 35.3 (L) 40.0 - 54.0 %    Mean Corpuscular Volume 92 82 - 98 fL    Mean Corpuscular Hemoglobin 27.9 27.0 - 31.0 pg    Mean Corpuscular Hemoglobin Conc 30.3 (L) 32.0 - 36.0 g/dL    RDW 16.4 (H) 11.5 - 14.5 %    Platelets 217 150 - 350 K/uL    MPV 11.4 9.2 - 12.9 fL    Immature Granulocytes 0.4 0.0 - 0.5 %    Gran # (ANC) 2.7 1.8 - 7.7 K/uL    Immature Grans (Abs) 0.02 0.00 - 0.04 K/uL    Lymph # 1.1 1.0 - 4.8 K/uL    Mono # 0.5 0.3 - 1.0 K/uL    Eos # 0.1 0.0 - 0.5 K/uL    Baso # 0.02 0.00 - 0.20 K/uL    nRBC 0 0 /100 WBC    Gran% 60.3 38.0 - 73.0 %    Lymph% 25.2 18.0 - 48.0 %    Mono% 10.6 4.0 - 15.0 %    Eosinophil% 3.1 0.0 - 8.0 %    Basophil% 0.4 0.0 - 1.9 %    Differential Method Automated        Recent Labs   Lab 02/17/20  1809 02/18/20  0003 02/18/20  0549 02/18/20  1119 02/19/20  1934 02/19/20  2113   POCTGLUCOSE 93 149* 87 127* 74 114*       Hemoglobin A1C   Date Value Ref Range  Status   02/06/2020 4.7 4.0 - 5.6 % Final     Comment:     ADA Screening Guidelines:  5.7-6.4%  Consistent with prediabetes  >or=6.5%  Consistent with diabetes  High levels of fetal hemoglobin interfere with the HbA1C  assay. Heterozygous hemoglobin variants (HbS, HgC, etc)do  not significantly interfere with this assay.   However, presence of multiple variants may affect accuracy.     11/09/2019 4.0 4.0 - 5.6 % Final     Comment:     ADA Screening Guidelines:  5.7-6.4%  Consistent with prediabetes  >or=6.5%  Consistent with diabetes  High levels of fetal hemoglobin interfere with the HbA1C  assay. Heterozygous hemoglobin variants (HbS, HgC, etc)do  not significantly interfere with this assay.   However, presence of multiple variants may affect accuracy.     06/22/2019 4.7 4.0 - 5.6 % Final     Comment:     ADA Screening Guidelines:  5.7-6.4%  Consistent with prediabetes  >or=6.5%  Consistent with diabetes  High levels of fetal hemoglobin interfere with the HbA1C  assay. Heterozygous hemoglobin variants (HbS, HgC, etc)do  not significantly interfere with this assay.   However, presence of multiple variants may affect accuracy.     06/22/2019 4.7 4.0 - 5.6 % Final     Comment:     ADA Screening Guidelines:  5.7-6.4%  Consistent with prediabetes  >or=6.5%  Consistent with diabetes  High levels of fetal hemoglobin interfere with the HbA1C  assay. Heterozygous hemoglobin variants (HbS, HgC, etc)do  not significantly interfere with this assay.   However, presence of multiple variants may affect accuracy.          Active Hospital Problems    Diagnosis  POA    *Hematemesis [K92.0]  Unknown    Gastric outlet obstruction [K31.1]  Yes    Acute upper GI bleed [K92.2]  Yes    ESRD on dialysis [N18.6, Z99.2]  Not Applicable    GERD with esophagitis [K21.0]  Yes    Renovascular hypertension [I15.0]  Yes     Chronic    Chronic diastolic heart failure [I50.32]  Yes     Chronic     2-23-17    1 - Low normal to mildly  depressed left ventricular systolic function (EF 50-55%).     2 - Right ventricular enlargement with mildly depressed systolic function.     3 - Left ventricular diastolic dysfunction.     4 - Right atrial enlargement.     5 - Severe tricuspid regurgitation.     6 - Pulmonary hypertension. The estimated PA systolic pressure is 86 mmHg.     7 - Increased central venous pressure.       Anemia in chronic kidney disease [N18.9, D63.1]  Yes     Chronic      Resolved Hospital Problems   No resolved problems to display.          ASSESSMENT AND PLAN:     Coffee Ground Emesis  Hx of Recurrent GI Bleed  Oropharyngeal dysphagia s/p PEG  - Admitted to hospital medicine for reported coffee ground emesis as per EMS/SNF. Hgb appears stable, and note patient recently had a scope done by GI (2/14) with showed non bleeding erosive gastropathy, and hiatal hernia. Discussed case with GI in ED, will start IV PPI BID and kept NPO. GI seen patient and since had EGD done recently with stable H/H, no need for EGD/colonosocopy at this time. Suspecting symptoms from ongoing gastroparesis and erosive gastropathy. GI rec conservative treatment with observation, antiemetics, IVF and Protonix.  - clear liquid diet  - start TF, and advance to goal today  - continue IV PPI BID  - PRN antiemetics   - monitor for gastroparesis and check residual, unsure if residuals were checked at SNF  - transfuse as needed  - avoid NSAIDs and anticoagulation  - notify provider or GI on call if bleeding     Seizures  -resume home keppra    Renovascular hypertension  -Optimal off meds per discharge summary  -PRN as needed hydralazine ordered     ESRD on HD:  -Pt undergoes HD MWF outpatient  -Consulted nephrology for HD     Chronic diastolic heart failure  -Last echo done at [facility] 8/2/19, EF>55%, bi-atrial enlargement  -No signs of volume overload, no acute issues     Severe malnutrition:  -Resume diet and TF as able         Prophylaxis- SCDs    Code Status- Full Code      Discharge plan and follow up - d/c back to SNF once medically stable, if Hgb and vitals stable, and tolerating TF, then hopefully dc tomorrow    Jaskaran Colon MD  Hospital Medicine Staff  Pager [phone]

## 2020-02-20 NOTE — PLAN OF CARE
Eval completed and POC established     Mike Aggarwal, PT, DPT  2020         Problem: Physical Therapy Goal  Goal: Physical Therapy Goal  Description  Goals to be met by: 3/20/2020    Patient will increase functional independence with mobility by performin. Supine to sit with Contact Guard Assistance  2. Sit to stand transfer with Moderate Assistance  3. Bed to chair transfer with Moderate Assistance   4. Lower extremity exercise program x15 reps per handout, with independence    Outcome: Ongoing, Progressing

## 2020-02-20 NOTE — ED TRIAGE NOTES
Pt presents to ED 6 via EMS for coffee ground emesis from [facility]. Staff called for EMS due to witnessing the coffee ground emesis; family reports not seeing it. Pt denies pain and nausea. Poor historian. Recently discharged.

## 2020-02-20 NOTE — ASSESSMENT & PLAN NOTE
The patient is a 55 yo AA male well known to the ESRD service who was admitted on 2/19 with complaints of hematemesis. He has a chronic hx of multiple GIBs and complaints of hematemesis.     [facility]  Dr. Lemus   3.4 hrs  YOANNA AVF  Oliguric     Assessment:   - No urgent need for dialysis at today. No signs of volume overload. Electrolytes and acid/base stable. BP stable. No clinical signs of uremia.   - Will plan for HD tomorrow for metabolic clearance and volume management  - Renal restrictions   - Strict I/Os and daily weights  - Hgb 10.7, within target   - Restart phos binders   - Daily renal function panels   - Will discuss with Dr. Delong

## 2020-02-21 NOTE — PROGRESS NOTES
Received pt to [facility]ADEEP in his bed.  Pt lying under blanket, refusing to speak.  Nodded head yes in agreement for dialysis.  Dialysis started via right arm fistula with 15 gauge needles, good blood flow, secured with tape.  Pt tolerated without difficulty.

## 2020-02-21 NOTE — PROGRESS NOTES
[facility] HEMODIALYSIS NOTE     Patient currently on hemodialysis for removal of uremic toxins and volume.     Patient seen and evaluated on hemodialysis, tolerating treatment, see HD flowsheet for vitals and assessments.      Ultrafiltration goal is 1L     Labs have been reviewed and the dialysate bath has been adjusted.     Assessment/Plan:  Refused dialysis this morning and this afternoon, but agreeable after evaluation.  Possible discharge today.      VANITA Valentine, [facility]  Nephrology  Pager:  [phone]

## 2020-02-21 NOTE — PROGRESS NOTES
Arrived per bed after initially refusing dialysis again today. VEE Navarro NP here upon pts arrival. Pt agrees to 2 hr dialysis trx.

## 2020-02-21 NOTE — PLAN OF CARE
02/21/20 0851   Post-Acute Status   Post-Acute Authorization Placement   Post-Acute Placement Status Awaiting Internal Medical Clearance

## 2020-02-21 NOTE — PROGRESS NOTES
Pt completed 2 hours dialysis via right arm fistula.  Needles removed and pressure held for 10 minutes.  Hemostasis achieved.  Pressure drsg applied and secured with tape.  Net removal 1 liter, pt tolerated without difficulty.  Report called to [facility], pt returned to room via hospital escort.

## 2020-02-21 NOTE — NURSING
Kylie, nurse at [facility] stating that they don't have the continuous feed bags, that they patient needs bolus orders for feeds.  Notified Dr. Colon, will wait

## 2020-02-21 NOTE — NURSING
Patient to receive Novasource renal bolus QID with 1 mL FWF after each bolus (these are the nursing home orders).  Jordyn nurse at [facility] updated and aware.

## 2020-02-21 NOTE — PLAN OF CARE
[facility]     Department of Hospital Medicine     [address] [place] [address]     [phone] after hours  [phone] fax       NURSING HOME ORDERS    02/21/2020    Admit to Nursing Home: Skilled Bed      Diagnoses:  Active Hospital Problems    Diagnosis  POA    *Hematemesis [K92.0]  Unknown    Gastric outlet obstruction [K31.1]  Yes    Acute upper GI bleed [K92.2]  Yes    ESRD on dialysis [N18.6, Z99.2]  Not Applicable    GERD with esophagitis [K21.0]  Yes    Renovascular hypertension [I15.0]  Yes     Chronic    Chronic diastolic heart failure [I50.32]  Yes     Chronic     2-23-17    1 - Low normal to mildly depressed left ventricular systolic function (EF 50-55%).     2 - Right ventricular enlargement with mildly depressed systolic function.     3 - Left ventricular diastolic dysfunction.     4 - Right atrial enlargement.     5 - Severe tricuspid regurgitation.     6 - Pulmonary hypertension. The estimated PA systolic pressure is 86 mmHg.     7 - Increased central venous pressure.       Anemia in chronic kidney disease [N18.9, D63.1]  Yes     Chronic      Resolved Hospital Problems   No resolved problems to display.       Patient is homebound due to:  Hematemesis    Allergies:  Review of patient's allergies indicates:   Allergen Reactions    Fosrenol [lanthanum] Nausea And Vomiting     Nausea and vomiting       Vitals:  Every shift (Skilled Nursing patients)     Diet: Mechanical soft renal diet with thin liquids. Continue to work with SLP and advance diet as tolerated. Continue tube feeds viz PEG tube in the mean time with  Novasource     If continuous - Novasource  @ 40 mL/hr to provide 1440 kcals, 65 g of protein, 516 mL fluid. Free water flushed 200 cc every 8 hours. Check TF residual every 8 hours. Note he has hx of gastroparesis so if residual increase hold TF and restart once residual reduce.     If bolus- Novasource cans or cartoon  "(~230cc) QID with 100 cc free water flush post bolus, check residual prior to bolus to ensure there is no residual which can worsen his gastroparesis.       Acitivities: Per PT   - Up in a chair each morning as tolerated   - Ambulate with assistance to bathroom   - Scheduled walks once each shift (every 8 hours)    LABS:  CBC and renal function test in 3 days. As per facility protocol     Nursing Precautions:   - Aspiration precautions:             - Total assistance with meals            -  Upright 90 degrees befor during and after meals             -  Suction at bedside          - Fall precautions per nursing home protocol   - Seizure precaution per half-way protocol   - Decubitus precautions:        -  for positioning   - Pressure reducing foam mattress   - Turn patient every two hours. Use wedge pillows to anchor patient  - Check residual every 8 hours with TF, very important as patient has hx of gastroparesis, will need to hold TF if residual increasing  - FWF every 8 hours 200 cc  - Note patient has been having "coffee ground emesis as per chart review and SNF and emsis" he has gastritis and esophagitis, he is on PPI BID, his hgb is relatively stable, this will only improve gradually.    CONSULTS:    Physical Therapy to evaluate and treat     Occupational Therapy to evaluate and treat     Speech Therapy  to evaluate and treat     Nutrition to evaluate and recommend diet     Psychiatry to evaluate and follow patients for delirium    MISCELLANEOUS CARE:        PEG Care:  Clean site every 24 hours     Routine Skin for Bedridden Patients:  Apply moisture barrier cream to all    skin folds and wet areas in perineal area daily and after baths and                           all bowel movements.                   Medications: Discontinue all previous medication orders, if any. See new list below.     Vaughn Retana   Home Medication Instructions ANABEL:[id number]    Printed on:02/21/20 1253   Medication " Information                      acetaminophen (TYLENOL) 325 MG tablet  Take 2 tablets (650 mg total) by mouth every 8 (eight) hours as needed.             hydrALAZINE (APRESOLINE) 50 MG tablet  Take 0.5 tablets (25 mg total) by mouth every 8 (eight) hours as needed (for SBP > 170).             levETIRAcetam (KEPPRA) 100 mg/mL Soln  Take 2.5 mLs (250 mg total) by mouth 2 (two) times daily.             midodrine (PROAMATINE) 5 MG Tab  Take 1 tablet (5 mg total) by mouth every Mon, Wed, Fri. Please take 30 min before dialysis on Monday Wednesday and Friday as needed             ondansetron (ZOFRAN) 8 MG tablet  Take 1 tablet (8 mg total) by mouth every 12 (twelve) hours as needed for Nausea.             pantoprazole (PROTONIX) 40 MG tablet  Take 1 tablet (40 mg total) by mouth 2 (two) times daily.             promethazine (PHENERGAN) 25 MG tablet  Take 1 tablet (25 mg total) by mouth every 6 (six) hours as needed for Nausea.             RENAPLEX-D 800 mcg-12.5 mg -2,000 unit Tab  Take 1 tablet by mouth once daily.             sevelamer carbonate (RENVELA) 800 mg Tab  Take 1 tablet (800 mg total) by mouth 3 (three) times daily with meals.                       _________________________________  Jaskaran Colon MD  02/21/2020

## 2020-02-21 NOTE — PLAN OF CARE
Problem: Wound  Goal: Optimal Wound Healing  Outcome: Ongoing, Progressing     Problem: Fall Injury Risk  Goal: Absence of Fall and Fall-Related Injury  Outcome: Ongoing, Progressing     Problem: Adult Inpatient Plan of Care  Goal: Plan of Care Review  Outcome: Ongoing, Progressing  Goal: Patient-Specific Goal (Individualization)  Outcome: Ongoing, Progressing  Goal: Absence of Hospital-Acquired Illness or Injury  Outcome: Ongoing, Progressing  Goal: Optimal Comfort and Wellbeing  Outcome: Ongoing, Progressing  Goal: Readiness for Transition of Care  Outcome: Ongoing, Progressing  Goal: Rounds/Family Conference  Outcome: Ongoing, Progressing     Problem: Infection  Goal: Infection Symptom Resolution  Outcome: Ongoing, Progressing     Problem: Diabetes Comorbidity  Goal: Blood Glucose Level Within Desired Range  Outcome: Ongoing, Progressing     Problem: Skin Injury Risk Increased  Goal: Skin Health and Integrity  Outcome: Ongoing, Progressing     Problem: Device-Related Complication Risk (Hemodialysis)  Goal: Safe, Effective Therapy Delivery  Outcome: Ongoing, Progressing     Problem: Hemodynamic Instability (Hemodialysis)  Goal: Vital Signs Remain in Desired Range  Outcome: Ongoing, Progressing     Problem: Infection (Hemodialysis)  Goal: Absence of Infection Signs/Symptoms  Outcome: Ongoing, Progressing

## 2020-02-21 NOTE — PLAN OF CARE
Recommendations    Recommendation:   1. Continue TF of Novasource renal @ goal rate 40 mL/hr to provide pt with 1980 kcal, 87 gm protein and 688 mL free water. Additional water per MD   2. Continue renal diet, mechancial soft ADAT to renal with texture per SLP   3. Add novasource renal ONS to increase intake   RD to monitor and follow up     Goals: pt to meet >85% of EEN/EPN by RD follow up   Nutrition Goal Status: new  Communication of RD Recs: other (comment)(POC)

## 2020-02-21 NOTE — NURSING
"Patient continued to refuse dialysis.  RN attempted to explain importance of dialysis for discharge, but patient stated, "I said I'm not going to dialysis, go away." Paged Dr. Colon.  "

## 2020-02-21 NOTE — PROGRESS NOTES
"[facility]  Adult Nutrition  Progress Note    SUMMARY       Recommendations    Recommendation:   1. Continue TF of Novasource renal @ goal rate 40 mL/hr to provide pt with 1980 kcal, 87 gm protein and 688 mL free water. Additional water per MD   2. Continue renal diet, mechancial soft ADAT to renal with texture per SLP   3. Add novasource renal ONS to increase intake   RD to monitor and follow up     Goals: pt to meet >85% of EEN/EPN by RD follow up   Nutrition Goal Status: new  Communication of RD Recs: other (comment)(POC)    Reason for Assessment    Reason For Assessment: new tube feeding  Diagnosis: (gastric outlet obstruction)  Relevant Medical History: ESRD; HTN; L. BKA  Interdisciplinary Rounds: did not attend  General Information Comments: Pt resting in bed, seems upset at time of visit. TF started and running at goal rate, novasource @ 40 mL/hr. Pt tolerating well. Diet advanced per chart to mechancial soft diet, tolerating well, PO ~25-50% of meals. GONZALO intake PTA, pt has been receiving PEG feeds. Wt loss per chart, UBW ~140 lbs. Unable to complete NFPE due to pt denying, previously pt with severe malnutrition, likely continues.   Nutrition Discharge Planning: adequate intake via PO / TF     Nutrition Risk Screen    Nutrition Risk Screen: dysphagia or difficulty swallowing    Nutrition/Diet History    Spiritual, Cultural Beliefs, Anabaptism Practices, Values that Affect Care: no  Factors Affecting Nutritional Intake: decreased appetite    Anthropometrics    Temp: 97.9 °F (36.6 °C)  Height Method: Stated  Height: 5' 6" (167.6 cm)  Height (inches): 66 in  Weight Method: Bed Scale  Weight: 53.4 kg (117 lb 11.6 oz)  Weight (lb): 117.73 lb  Ideal Body Weight (IBW), Male: 142 lb  % Ideal Body Weight, Male (lb): 82.91 %  BMI (Calculated): 19  BMI Grade: 18.5-24.9 - normal       Lab/Procedures/Meds    Pertinent Labs Reviewed: reviewed  Pertinent Labs Comments: Cr 4.3; Glucose 62  Pertinent " Medications Reviewed: reviewed  Pertinent Medications Comments: pantoprazole     Estimated/Assessed Needs    Weight Used For Calorie Calculations: 53.4 kg (117 lb 11.6 oz)  Energy Calorie Requirements (kcal): 1869 kcal/d  Energy Need Method: Kcal/kg  Protein Requirements: 74-85 g/day  Weight Used For Protein Calculations: 53.4 kg (117 lb 11.6 oz)  Fluid Requirements (mL): 1 mL/kcal or per MD  RDA Method (mL): 1869    Nutrition Prescription Ordered    Current Diet Order: Renal mechanical soft diet, 1200 FR   Current Nutrition Support Formula Ordered: Novasource Renal  Current Nutrition Support Rate Ordered: 40 (ml)  Current Nutrition Support Frequency Ordered: mL/hr     Evaluation of Received Nutrient/Fluid Intake    Enteral Calories (kcal): 1920  Enteral Protein (gm): 87  Enteral (Free Water) Fluid (mL): 688  Energy Calories Required: meeting needs  Protein Required: meeting needs  Fluid Required: meeting needs  Comments: LBM 2/20  Tolerance: tolerating  % Intake of Estimated Energy Needs: 75 - 100 %  % Meal Intake: 0 - 25 %    Nutrition Risk    Level of Risk/Frequency of Follow-up: low     Assessment and Plan    Nutrition Problem  inadequate oral intake    Related to (etiology):   Decreased PO intake     Signs and Symptoms (as evidenced by):   Pt s/p PEG, TF dependant      Interventions (treatment strategy):  Collaboration of care with other providers  Enteral nutrition     Nutrition Diagnosis Status:   New    Monitor and Evaluation    Food and Nutrient Intake: energy intake, food and beverage intake, enteral nutrition intake  Food and Nutrient Adminstration: diet order, enteral and parenteral nutrition administration  Anthropometric Measurements: weight, weight change  Biochemical Data, Medical Tests and Procedures: electrolyte and renal panel, lipid profile, gastrointestinal profile, glucose/endocrine profile, inflammatory profile  Nutrition-Focused Physical Findings: overall appearance     Nutrition  Follow-Up    RD Follow-up?: Yes

## 2020-02-22 NOTE — NURSING
IV removed, tip in tact.  Transport given plan of care nursing home orders and DC instructions. Belongings returned.

## 2020-02-24 NOTE — PROGRESS NOTES
[facility]                                 Skilled Nursing [facility]                   Progress Note     Admit Date: 2/18/20  JUAN   Principal Problem:  Debility r/t recent encephalopathy, seizures, aspiration pneumonia  HPI obtained from patient interview and chart review     Visit Date: 2/26/2020    Chief Complaint:  Return to facility after ED visit due to coffee-ground emesis, BP/HR monitoring, lab review, PT/OT progression    HPI:   Mr. 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 He is a long term resident at this facility, and is returning to [facility] as skilled nursing after a hospitalization for new onset seizures. He was admitted to critical care with concerns of new onset seizures and s/p intubation for airway protection. Patient was started on vanc, rocephin, ampicillin, and acyclovir to cover for meningitis. MRI negative>Lumbar puncture was done after MRI>CSF labs not convincing for bacterial or viral meningitis and antibiotics were weaned down. Continuous EEG was done,showed epileptiform discharges. Epilepsy consulted during admit, tx with Keppra titrated to appropriate dose due to sedation.  Patient also experienced aspiration pneumonia, treated with Augmentin.  Nephrology followed for HD txs. Patient extubated, but experienced dysphagia afterwards, he is s/p PEG placement on 2/12 with TF's + dental soft diet. Initially GI recommended no EGD needed, but patient developed coffee-ground emesis, underwent EGD: showed normal duodenum, nonbleeding erosive gastropathy, 2 cm hiatal hernia that was biopsied, intact gastrostomy. He returns to facility under skilled nursing care due to  debility.      Patient will be treated at [facility] with PT and OT to improve functional status and ability to perform ADLs.     Interval history:   /87, HR 85, stable on current regimen.  Patient apparently was sent to the ED after last visit on 02/19 for reported coffee-ground emesis.  Case was discussed with GI while patient was in the ED, he was given IV PPIs and held NPO, GI did not think patient needed EGD due to multiple rate current scopes in the last several months.  They suspected patient's symptoms are from ongoing gastroparesis and erosive gastropathy and recommended conservative treatments with antiemetics, IV fluids and Protonix.  They discharged him back to [facility] as skilled nursing care again.  Patient appears to be feeling well today during visit, he is eating [employer] after dialysis.  Discussed importance of avoiding fast food and non friendly foods since he is dialysis patient, he stated he can eat whatever he wants and there is nothing wrong with [employer].  Patient has historically been extremely noncompliant with all aspects of care.  Nursing reporting he is refusing tube feeds but stating he is eating all 3 of his meals daily plus snacks.  Will ask RD to see patient to ensure he is getting in enough calories.  Peg tube will remain clamped except for flushes.  Patient will follow up with GI regarding need for continued PEG tube.  Lab work from 02/24 reviewed today, , H&H 9/27, K 3.8, albumin 2.4, continuing on ProStat b.i.d..  No changes needed to plan of care at this time.  Patient medically complex.  Will continue to monitor and treat of chronic conditions.  Progressing slowly with PT/OT due to refusal of care at times.    Past Medical History: Patient has a past medical history of Amputation stump pain (9/10/2013), Aspiration pneumonia (7/27/2015), Asterixis (11/8/2016), C. difficile colitis (8/7/2015), Cholelithiasis without obstruction (8/25/2015),  Chronic diastolic heart failure, Chronic low back pain (12/1/2015), Closed head injury (9/8/2016), DVT (deep venous thrombosis) (7/28/2017), ESRD on hemodialysis (2/7/2013), GERD (gastroesophageal reflux disease), HCV antibody positive, Hemiparesis affecting left side as late effect of stroke (11/08/2016), History of Intracerebral Hemorrhage: L BG 5/2013; R BG 9/2016; R BG 11/2016; L caudate head 2/2017 (11/2/2016), Hypertension, left basal ganglia ICH 5/2013 (11/2/2016), Left Caudate Head ICH 2/22/2017 (2/24/2017), 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    Past Surgical History: Patient has a past surgical history that includes R AVF (9/12/12); Leg amputation through knee (12/18/2013); Foot amputation through metatarsal; Colonoscopy; Colonoscopy (N/A, 4/4/2017); Esophagogastroduodenoscopy (N/A, 6/12/2018); Upper gastrointestinal endoscopy; Esophagogastroduodenoscopy (N/A, 3/7/2019); Esophagogastroduodenoscopy (N/A, 9/23/2019); Esophagogastroduodenoscopy (N/A, 10/2/2019); Esophagogastroduodenoscopy (N/A, 11/12/2019); and Esophagogastroduodenoscopy (N/A, 2/14/2020).    Social History: Patient reports that he has quit smoking. He has a 10.00 pack-year smoking history. He has never used smokeless tobacco. He reports that he does not drink alcohol or use drugs.    Family History: family history includes Alcohol abuse in his maternal grandmother; Diabetes in his brother and maternal grandfather; Early death in his mother; Heart disease in his father; Hyperlipidemia in his father; Hypertension in his father and sister; Kidney disease in his father.    Allergies: Patient is  allergic to fosrenol [lanthanum].    ROS  Constitutional: Negative for fever or fatigue.   Eyes: Negative for blurred vision, double vision and discharge.   Respiratory: Negative for cough, shortness of breath and wheezing.    Cardiovascular: Negative for chest pain, palpitations, and leg swelling.   Gastrointestinal: Negative for abdominal pain, constipation, diarrhea, nausea and vomiting.   Genitourinary: Negative for dysuria, frequency and urgency, + PEG tube.   Musculoskeletal:  + generalized weakness. Negative for back pain and myalgias.   Skin: Negative for itching and rash.   Neurological: Negative for dizziness, speech change, and headaches.   Psychiatric/Behavioral: Negative for depression. The patient is not nervous/anxious.      PEx     Constitutional: Patient appears well-developed and in no distress   Head: Normocephalic and atraumatic.   Eyes: Pupils are equal, round, and reactive to light.   Neck: Normal range of motion. Neck supple.   Cardiovascular: Normal rate, regular rhythm and normal heart sounds.    Pulmonary/Chest: Effort normal and breath sounds are clear  Abdominal: Soft. Bowel sounds are normal, + PEG tube.   Musculoskeletal: Normal range of motion.   Neurological: Alert and oriented to person, place, and time.   Psychiatric: Normal mood and affect. Behavior is normal.   Skin: Skin is warm and dry. Full skin assessment completed during visit, no concerns noted      Assessment and Plan:    Recent history of coffee-ground emesis requiring ED visit, improved  Gastroparesis, ongoing  -Previous NM gastric emptying study was done > suggestive of gastroparesis retaining 50% of his gastric contents 4 hours after meals> started PPI 40 mg BID, (previously on Reglan TID before meals, and Carafate BID)   -2/26 continue Protonix b.i.d.    Aftercare of Recent Seizures, ongoing  Aftercare of recent Acute encephalopathy, resolved  - tonic clonic seizure during admit>intubated> concern for status>Tx with  multiple broad spectrum antibiotics/antivirals>BC neg> LP neg>Possibly multiple foci for seizure overtime>CT without acute changes, MRI brain with chronic changes and hypertensive angiopathy; no acute changes.   -Neurology consulted. repeat EEG on 01/27 shows diffuse slowing, though no focal activity seen on EEG, started on Keppra.   -continue keppra 250mg BID per neuro recs   -continue aspiration precautions, fall precautions, seizure precaution  -2/19 initiate Keppra level to be drawn in 1 month to monitor dose  -2/26 continue Keppra    Debility r/t recent Acute encephalopathy, seizures, ongoing  - Continue with PT/OT for gait training and strengthening and restoration of ADL's   - Encourage mobility, OOB in chair, and early ambulation as appropriate  - Fall precautions   - Monitor for bowel and bladder dysfunction  - Monitor for and prevent skin breakdown and pressure ulcers  - Continue Tylenol PRN pain  -2/26 continue PT/OT     After care of Recent Hx of Aspiration PNA, resolved  -CXR concerning of aspiration>started oral Augmentin renally dosed for 7 days total (2/19 - end date)  -2/26 Augmentin has completed, no further treatment indicated     Oropharyngeal dysphagia, ongoing  S/p peg tube placement on 02/12/20  -likely secondary to acute encephalopathy and possible critical illness myopathy/neuropathy  -IR was consulted, patient is s/p peg tube placement on 02/12/2020  -continue NovaSource tube feeds plus diet my RD to evaluate-patient refusing  -2/26 patient refusing all tube feeds, nursing flushing tube to keep patent, patient will follow up with GI regarding need for continued PEG tube, he is tolerating p.o. intake    Aftercare of Gastrointestinal hemorrhage with hematemesis, improving  Anemia in chronic kidney disease r/t ESRD, ongoing  GERD with esophagitis, ongoing  -gastritis vs PUD vs esophagitis, vs MWT (in setting of vomiting)>multiple prior EGDs.   -Previous EGD 9/23, LA Grade D reflux esophagitis.  Medium-sized hiatal hernia. No active GI bleed> received PRBC transfusions during recent stay  -EGD during admit 2/2020 findings: normal duodenum, nonbleeding erosive gastropathy, 2 cm hiatal hernia that was biopsied  -Continue Protonix 40mg BID   -Continue zofran PRN, Phenergan p.r.n.  -2/26 continue current regimen     HTN with chronic diastolic heart failure, ongoing  ESRD on hemodialysis, ongoing  Chronic kidney disease-mineral and bone disorder, ongoing  Hypoalbuminemia, ongoing  -Dialysis M/W/F via RICHARD AV fistula  -Continue Midodrine On MWF prior to HD ( previously on Coreg 3.125 mg PO BID)  -Continue renvela TID + renal vitamin q.d.  -2/19 initiate ProStat 30 mL p.o. b.i.d.  -2/26 albumin 2.4, continue ProStat, renal vitamins, renvela, continue newly started hydralazine q.8 hours p.r.n. for blood pressure control    Continued     Remote History of Intracerebral Hemorrhage, multiple, 5/2013; 9/2016; 11/2016; 2/2017  -Not currently on DAPT, or AC given h/o ICH/GIB.      Previous Left BKA 2/2 osteomyelitis  -continue prosthetic       No future appointments.      Extended time visit:  Total time:68 minutes  Start Time: 1600  End Time:  1708  Non face-to-face time: 37 minutes  Description of time:  Review over provider notes, pertinent testing/imaging during ED visit, changes to plan of care      Kimberly Hernandez NP          Patient note was created using MModal Dictation.  Any errors in syntax or even information may not have been identified and edited on initial review prior to signing this note.

## 2020-03-03 NOTE — PROGRESS NOTES
[facility]                                 Skilled Nursing Facility                   Progress Note     Admit Date: 2/18/20  JUAN   Principal Problem:  Debility r/t recent encephalopathy, seizures, aspiration pneumonia  HPI obtained from patient interview and chart review     Visit Date: 3/4/2020    Chief Complaint:   BP/HR monitoring,  PT/OT progression    HPI:   Mr. Retana is a 53 y/o male with a pertinent PMHx of Chronic diastolic heart failure, multiple intracranial hemorrhages with left hemiparesis, end-stage renal on HD with secondary hyperparathyroidism, GERD, hep C, HTN, left BKA 2/2 osteomyelitis, latent TB, pulmonary hypertension, noncompliance. He is a long term resident at this facility, and is returning to [facility] as skilled nursing after a hospitalization for new onset seizures. He was admitted to critical care with concerns of new onset seizures and s/p intubation for airway protection. Patient was started on vanc, rocephin, ampicillin, and acyclovir to cover for meningitis. MRI negative>Lumbar puncture was done after MRI>CSF labs not convincing for bacterial or viral meningitis and antibiotics were weaned down. Continuous EEG was done,showed epileptiform discharges. Epilepsy consulted during admit, tx with Keppra titrated to appropriate dose due to sedation.  Patient also experienced aspiration pneumonia, treated with Augmentin.  Nephrology followed for HD txs. Patient extubated, but experienced dysphagia afterwards, he is s/p PEG placement on 2/12 with TF's + dental soft diet. Initially GI recommended no EGD needed, but patient developed coffee-ground emesis, underwent EGD: showed normal duodenum, nonbleeding erosive gastropathy, 2 cm hiatal hernia that was biopsied, intact gastrostomy. He returns to facility under skilled nursing care due to debility.      Patient will be treated at [facility] with PT  and OT to improve functional status and ability to perform ADLs.     Interval history:   /76, HR 78, stable on current regimen.  RD recently saw patient since last visit, changed orders to read [employer] 1 carton t.i.d. p.o., give via peg tube if patient refuses p.o. for nutrition.  Patient continues with intermittent nausea/vomiting, seems to be related to dialysis days.He has no new complaints today during visit.  No changes needed to plan of care at this time.  Patient medically complex.  Will continue to monitor and treat of chronic conditions.  Progressing slowly with PT/OT due to refusal of care at times.    Past Medical History: Patient has a past medical history of Amputation stump pain (9/10/2013), Aspiration pneumonia (7/27/2015), Asterixis (11/8/2016), C. difficile colitis (8/7/2015), Cholelithiasis without obstruction (8/25/2015), Chronic diastolic heart failure, Chronic low back pain (12/1/2015), Closed head injury (9/8/2016), DVT (deep venous thrombosis) (7/28/2017), ESRD on hemodialysis (2/7/2013), GERD (gastroesophageal reflux disease), HCV antibody positive, Hemiparesis affecting left side as late effect of stroke (11/08/2016), History of Intracerebral Hemorrhage: L BG 5/2013; R BG 9/2016; R BG 11/2016; L caudate head 2/2017 (11/2/2016), Hypertension, left basal ganglia ICH 5/2013 (11/2/2016), Left Caudate Head ICH 2/22/2017 (2/24/2017), 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    Past Surgical History: Patient has a past surgical history that includes R AVF (9/12/12); Leg amputation through knee (12/18/2013); Foot amputation through metatarsal;  Colonoscopy; Colonoscopy (N/A, 4/4/2017); Esophagogastroduodenoscopy (N/A, 6/12/2018); Upper gastrointestinal endoscopy; Esophagogastroduodenoscopy (N/A, 3/7/2019); Esophagogastroduodenoscopy (N/A, 9/23/2019); Esophagogastroduodenoscopy (N/A, 10/2/2019); Esophagogastroduodenoscopy (N/A, 11/12/2019); and Esophagogastroduodenoscopy (N/A, 2/14/2020).    Social History: Patient reports that he has quit smoking. He has a 10.00 pack-year smoking history. He has never used smokeless tobacco. He reports that he does not drink alcohol or use drugs.    Family History: family history includes Alcohol abuse in his maternal grandmother; Diabetes in his brother and maternal grandfather; Early death in his mother; Heart disease in his father; Hyperlipidemia in his father; Hypertension in his father and sister; Kidney disease in his father.    Allergies: Patient is allergic to fosrenol [lanthanum].    ROS  Constitutional: Negative for fever or fatigue.   Eyes: Negative for blurred vision, double vision and discharge.   Respiratory: Negative for cough, shortness of breath and wheezing.    Cardiovascular: Negative for chest pain, palpitations, and leg swelling.   Gastrointestinal: Negative for abdominal pain, constipation, diarrhea, nausea and vomiting.   Genitourinary: Negative for dysuria, frequency and urgency, + PEG tube.   Musculoskeletal:  + generalized weakness. Negative for back pain and myalgias.   Skin: Negative for itching and rash.   Neurological: Negative for dizziness, speech change, and headaches.   Psychiatric/Behavioral: Negative for depression. The patient is not nervous/anxious.      PEx     Constitutional: Patient appears well-developed and in no distress   Head: Normocephalic and atraumatic.   Eyes: Pupils are equal, round, and reactive to light.   Neck: Normal range of motion. Neck supple.   Cardiovascular: Normal rate, regular rhythm and normal heart sounds.    Pulmonary/Chest: Effort normal and breath sounds  are clear  Abdominal: Soft. Bowel sounds are normal, + PEG tube.   Musculoskeletal: Normal range of motion.   Neurological: Alert and oriented to person, place, and time.   Psychiatric: Normal mood and affect. Behavior is normal.   Skin: Skin is warm and dry.      Assessment and Plan:    Recent history of coffee-ground emesis requiring ED visit, improved  Gastroparesis, ongoing  -Previous NM gastric emptying study was done > suggestive of gastroparesis retaining 50% of his gastric contents 4 hours after meals> started PPI 40 mg BID, (previously on Reglan TID before meals, and Carafate BID)   -3/4 continue Protonix b.i.d.    Aftercare of Recent Seizures, ongoing  Aftercare of recent Acute encephalopathy, resolved  - tonic clonic seizure during admit>intubated> concern for status>Tx with multiple broad spectrum antibiotics/antivirals>BC neg> LP neg>Possibly multiple foci for seizure overtime>CT without acute changes, MRI brain with chronic changes and hypertensive angiopathy; no acute changes.   -Neurology consulted. repeat EEG on 01/27 shows diffuse slowing, though no focal activity seen on EEG, started on Keppra.   -continue keppra 250mg BID per neuro recs   -continue aspiration precautions, fall precautions, seizure precaution  -2/19 initiate Keppra level to be drawn in 1 month to monitor dose  -3/4 continue Keppra    Debility r/t recent Acute encephalopathy, seizures, ongoing  - Continue with PT/OT for gait training and strengthening and restoration of ADL's   - Encourage mobility, OOB in chair, and early ambulation as appropriate  - Fall precautions   - Monitor for bowel and bladder dysfunction  - Monitor for and prevent skin breakdown and pressure ulcers  - Continue Tylenol PRN pain  -3/4 continue PT/OT    Oropharyngeal dysphagia, ongoing  S/p peg tube placement on 02/12/20  -likely secondary to acute encephalopathy and possible critical illness myopathy/neuropathy  -IR was consulted, patient is s/p peg tube  placement on 02/12/2020  -continue [employer] tube feeds plus diet my RD to evaluate-patient refusing  -2/26 patient refusing all tube feeds, nursing flushing tube to keep patent, patient will follow up with GI regarding need for continued PEG tube, he is tolerating p.o. Intake  -3/4 RD ordered for patient to receive [employer] 1 carton t.i.d., give via peg tube if patient will take p.o. for nutrition    Aftercare of Gastrointestinal hemorrhage with hematemesis, improving  Anemia in chronic kidney disease r/t ESRD, ongoing  GERD with esophagitis, ongoing  -gastritis vs PUD vs esophagitis, vs MWT (in setting of vomiting)>multiple prior EGDs.   -Previous EGD 9/23, LA Grade D reflux esophagitis. Medium-sized hiatal hernia. No active GI bleed> received PRBC transfusions during recent stay  -EGD during admit 2/2020 findings: normal duodenum, nonbleeding erosive gastropathy, 2 cm hiatal hernia that was biopsied  -Continue Protonix 40mg BID   -Continue zofran PRN, Phenergan p.r.n.  -3/4 continue current regimen     HTN with chronic diastolic heart failure, ongoing  ESRD on hemodialysis, ongoing  Chronic kidney disease-mineral and bone disorder, ongoing  Hypoalbuminemia, ongoing  -Dialysis M/W/F via RICHARD AV fistula  -Continue Midodrine On MWF prior to HD ( previously on Coreg 3.125 mg PO BID)  -Continue renvela TID + renal vitamin q.d.  -2/19 initiate ProStat 30 mL p.o. b.i.d.  -3/4 continue ProStat, renal vitamins, renvela,  newly started hydralazine q.8 hours p.r.n. for blood pressure control    Continued     Remote History of Intracerebral Hemorrhage, multiple, 5/2013; 9/2016; 11/2016; 2/2017  -Not currently on DAPT, or AC given h/o ICH/GIB.      Previous Left BKA 2/2 osteomyelitis  -continue prosthetic         After care of Recent Hx of Aspiration PNA, resolved  -CXR concerning of aspiration>started oral Augmentin renally dosed for 7 days total (2/19 - end date)          No future appointments.        Kimberly Hernandez,  NP          Patient note was created using MModal Dictation.  Any errors in syntax or even information may not have been identified and edited on initial review prior to signing this note.

## 2020-03-11 NOTE — PROGRESS NOTES
[facility]                                 Skilled Nursing [facility]                   Progress Note     Admit Date: 2/18/20  JUAN   Principal Problem:  Debility r/t recent encephalopathy, seizures, aspiration pneumonia  HPI obtained from patient interview and chart review     Visit Date: 3/11/2020    Chief Complaint:   BP/HR monitoring,  PT/OT progression, RD recommendations, seizure monitoring    HPI:   Mr. 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 He is a long term resident at this facility, and is returning to [facility] [place] as skilled nursing after a hospitalization for new onset seizures. He was admitted to critical care with concerns of new onset seizures and s/p intubation for airway protection. Patient was started on vanc, rocephin, ampicillin, and acyclovir to cover for meningitis. MRI negative>Lumbar puncture was done after MRI>CSF labs not convincing for bacterial or viral meningitis and antibiotics were weaned down. Continuous EEG was done,showed epileptiform discharges. Epilepsy consulted during admit, tx with Keppra titrated to appropriate dose due to sedation.  Patient also experienced aspiration pneumonia, treated with Augmentin.  Nephrology followed for HD txs. Patient extubated, but experienced dysphagia afterwards, he is s/p PEG placement on 2/12 with TF's + dental soft diet. Initially GI recommended no EGD needed, but patient developed coffee-ground emesis, underwent EGD: showed normal duodenum, nonbleeding erosive gastropathy, 2 cm hiatal hernia that was biopsied, intact gastrostomy. He returns to facility under skilled nursing care due to debility.      Patient will be treated  at [facility] with PT and OT to improve functional status and ability to perform ADLs.     Interval history:   /82, HR 76, stable on current regimen.  RD recently saw patient since last visit, new recommendations are now scheduled tube feedings via PEG from 7:00 p.m.-5:00 a.m. at 50 cc/hour for nutritional support. Patient continues with intermittent nausea/vomiting, seems to be related to dialysis days.  He was recently started on Keppra due to seizures requiring hospitalization, likely needs follow-up level monitoring within the next few weeks.  He has no new complaints today during visit.  No changes needed to plan of care at this time.  Patient medically complex.  Will continue to monitor and treat of chronic conditions.  Progressing slowly with PT/OT due to refusal of care at times.    Past Medical History: Patient has a past medical history of Amputation stump pain (9/10/2013), Aspiration pneumonia (7/27/2015), Asterixis (11/8/2016), C. difficile colitis (8/7/2015), Cholelithiasis without obstruction (8/25/2015), Chronic diastolic heart failure, Chronic low back pain (12/1/2015), Closed head injury (9/8/2016), DVT (deep venous thrombosis) (7/28/2017), ESRD on hemodialysis (2/7/2013), GERD (gastroesophageal reflux disease), HCV antibody positive, Hemiparesis affecting left side as late effect of stroke (11/08/2016), History of Intracerebral Hemorrhage: L BG 5/2013; R BG 9/2016; R BG 11/2016; L caudate head 2/2017 (11/2/2016), Hypertension, left basal ganglia ICH 5/2013 (11/2/2016), Left Caudate Head ICH 2/22/2017 (2/24/2017), Malignant hypertension with heart failure and ESRD (8/1/2015), Metabolic acidosis, IAG, reduced excretion of inorganic acids, Myoclonic jerking (9/20/2016), Noncompliance with medication regimen (12/4/2018), Secondary hyperparathyroidism (of renal origin), Secondary pulmonary hypertension (3/23/2017), 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    Past Surgical History: Patient has a past surgical history that includes R AVF (9/12/12); Leg amputation through knee (12/18/2013); Foot amputation through metatarsal; Colonoscopy; Colonoscopy (N/A, 4/4/2017); Esophagogastroduodenoscopy (N/A, 6/12/2018); Upper gastrointestinal endoscopy; Esophagogastroduodenoscopy (N/A, 3/7/2019); Esophagogastroduodenoscopy (N/A, 9/23/2019); Esophagogastroduodenoscopy (N/A, 10/2/2019); Esophagogastroduodenoscopy (N/A, 11/12/2019); and Esophagogastroduodenoscopy (N/A, 2/14/2020).    Social History: Patient reports that he has quit smoking. He has a 10.00 pack-year smoking history. He has never used smokeless tobacco. He reports that he does not drink alcohol or use drugs.    Family History: family history includes Alcohol abuse in his maternal grandmother; Diabetes in his brother and maternal grandfather; Early death in his mother; Heart disease in his father; Hyperlipidemia in his father; Hypertension in his father and sister; Kidney disease in his father.    Allergies: Patient is allergic to fosrenol [lanthanum].    ROS  Constitutional: Negative for fever or fatigue.   Eyes: Negative for blurred vision, double vision and discharge.   Respiratory: Negative for cough, shortness of breath and wheezing.    Cardiovascular: Negative for chest pain, palpitations, and leg swelling.   Gastrointestinal: Negative for abdominal pain, constipation, diarrhea, nausea and vomiting.   Genitourinary: Negative for dysuria, frequency and urgency, + PEG tube.   Musculoskeletal:  + generalized weakness. Negative for back pain and myalgias.   Skin: Negative for itching and rash.   Neurological: Negative for dizziness, speech change, and headaches.   Psychiatric/Behavioral: Negative for depression. The patient is not nervous/anxious.      PEx     Constitutional: Patient appears well-developed and in no distress   Head: Normocephalic and atraumatic.   Eyes: Pupils are equal,  round, and reactive to light.   Neck: Normal range of motion. Neck supple.   Cardiovascular: Normal rate, regular rhythm and normal heart sounds.    Pulmonary/Chest: Effort normal and breath sounds are clear  Abdominal: Soft. Bowel sounds are normal, + PEG tube.   Musculoskeletal: Normal range of motion.   Neurological: Alert and oriented to person, place, and time.   Psychiatric: Normal mood and affect. Behavior is normal.   Skin: Skin is warm and dry.      Assessment and Plan:    Aftercare of Recent Seizures, ongoing  Aftercare of recent Acute encephalopathy, resolved  - tonic clonic seizure during admit>intubated> concern for status>Tx with multiple broad spectrum antibiotics/antivirals>BC neg> LP neg>Possibly multiple foci for seizure overtime>CT without acute changes, MRI brain with chronic changes and hypertensive angiopathy; no acute changes.   -Neurology consulted. repeat EEG on 01/27 shows diffuse slowing, though no focal activity seen on EEG, started on Keppra.   -continue keppra 250mg BID per neuro recs   -continue aspiration precautions, fall precautions, seizure precaution  -2/19 initiate Keppra level to be drawn in 1 month to monitor dose  -3/11 continue Keppra    Debility r/t recent Acute encephalopathy, seizures, ongoing  - Continue with PT/OT for gait training and strengthening and restoration of ADL's   - Encourage mobility, OOB in chair, and early ambulation as appropriate  - Fall precautions   - Monitor for bowel and bladder dysfunction  - Monitor for and prevent skin breakdown and pressure ulcers  - Continue Tylenol PRN pain  -3/11 continue PT/OT    Oropharyngeal dysphagia, ongoing  S/p peg tube placement on 02/12/20  -likely secondary to acute encephalopathy and possible critical illness myopathy/neuropathy  -IR was consulted, patient is s/p peg tube placement on 02/12/2020  -continue [employer] tube feeds plus diet my RD to evaluate-patient refusing  -2/26 patient refusing all tube feeds,  nursing flushing tube to keep patent, patient will follow up with GI regarding need for continued PEG tube, he is tolerating p.o. Intake  -3/11 new RD recs:  Tube feeds scheduled from - continuous at 50 cc/hour for nutritional support, p.o. diet during the daytime    Aftercare of Gastrointestinal hemorrhage with hematemesis, improving  Anemia in chronic kidney disease r/t ESRD, ongoing  GERD with esophagitis, ongoing  -gastritis vs PUD vs esophagitis, vs MWT (in setting of vomiting)>multiple prior EGDs.   -Previous EGD 9/23, LA Grade D reflux esophagitis. Medium-sized hiatal hernia. No active GI bleed> received PRBC transfusions during recent stay  -EGD during admit 2/2020 findings: normal duodenum, nonbleeding erosive gastropathy, 2 cm hiatal hernia that was biopsied  -Continue Protonix 40mg BID   -Continue zofran PRN, Phenergan p.r.n.  -3/11 continue current regimen     HTN with chronic diastolic heart failure, ongoing  ESRD on hemodialysis, ongoing  Chronic kidney disease-mineral and bone disorder, ongoing  Hypoalbuminemia, ongoing  -Dialysis M/W/F via R[facility]ARD AV fistula  -Continue Midodrine On MWF prior to HD ( previously on Coreg 3.125 mg PO BID)  -Continue renvela TID + renal vitamin q.d.  -2/19 initiate ProStat 30 mL p.o. b.i.d.  -3/11 continue nutritional support, hydralazine q.8 hours p.r.n. for blood pressure control, HD 3 times weekly    Gastroparesis, ongoing  Recent history of coffee-ground emesis requiring ED visit, resolved  -Previous NM gastric emptying study was done > suggestive of gastroparesis retaining 50% of his gastric contents 4 hours after meals> started PPI 40 mg BID, (previously on Reglan TID before meals, and Carafate BID)   -3/11 continue Protonix b.i.d.    Continued     Remote History of Intracerebral Hemorrhage, multiple, 5/2013; 9/2016; 11/2016; 2/2017  -Not currently on DAPT, or AC given h/o ICH/GIB.      Previous Left BKA 2/2 osteomyelitis  -continue prosthetic         After care of  Recent Hx of Aspiration PNA, resolved  -CXR concerning of aspiration>started oral Augmentin renally dosed for 7 days total (2/19 - end date)          No future appointments.        Kimberly Hernandez NP          Patient note was created using MModal Dictation.  Any errors in syntax or even information may not have been identified and edited on initial review prior to signing this note.

## 2020-03-11 NOTE — PHYSICIAN QUERY
PT Name: Vaughn Retana  MR #: [id number]     Physician Query Form - Documentation Clarification      CDS/: TAMIKO West, RN, CCDS               Contact information: [email or web address]    This form is a permanent document in the medical record.     Query Date: March 11, 2020    By submitting this query, we are merely seeking further clarification of documentation. Please utilize your independent clinical judgment when addressing the question(s) below.    The Medical record reflects the following:    Supporting Clinical Findings Location in Medical Record   Renovascular hypertension   - Patient on coreg at nursing home; has been compliant as given by nursing home   - Patient with hypertensive emergency on admit   - Started Cardene drip, titrate to BP of 160-180   - Follow up MRI brain, concern for PRES     Vital Signs (24h Range):  Pulse:  [] 120  BP: (112-217)/() 112/66    Nicardipine IV 1/20 [id number]  Norepinephrine IV 1/21 [id number]    Vital Signs (24h Range):  Pulse:  [] 99  BP: ()/() 156/88    Patient seen and evaluated on hemodialysis, tolerating treatment, see HD flowsheet for vitals and assessments.     No Hypotension, chest pain, shortness of breath, cramping, nausea or vomiting.     2.5hr dialysis treatment completed with net UF of 200ml. Treatment ended 24mins early due to low blood pressure. SBP 80'S; MAP <60.  Unable to pull fluids. UF turned off.     ESRD: continue with RRT as blood pressure tolerates. No volume removed overnight due to hypotension.       PRES diagnosis-Ruled Out      Neurology would like SBP < 160; initiated home Carvedilol. Pt became hypotensive with HD on 1/24 so unable to remove fluid off during dialysis. BP trending back up and stable.    1/20 Critical Care Medicine by Dr. Gonzales/Dr. Micheal KUMAR      1/21 Critical Care Medicine Progress Note by Dr. Woodward/Dr. Burton    1/22 Nephrology Progress Note by ADILIA  Pramod/Dr. Ahn          1/24 Dialysis Nurse Progress Note filed 1/25 12:20AM      1/25 Critical Care Medicine Progress Note by Dr. Arnold/Dr. Dahl    1/29 Physician Query filed by Dr. Morgan    2/2 Critical Care Medicine Progress Note by Dr. Leroy/Dr. Handley     - PRES: Patient off cardene drip. MRI reviewed. Will follow blood pressure as patient is normally hypotensive.     PEx   Pulse:  [79-93]   BP: (134-183)/()    2/18 Hospital Medicine Discharge Summary by Dr. Dalton                                                                            Doctor, Please specify diagnosis or diagnoses associated with above clinical findings.    Please clarify hypotension diagnosis for this admission. Check all that apply.    Provider Use Only      (   ) Hypotension due to hemodialysis    (   ) Hypotension, unspecified    (   ) Chronic Hypotension due to medications (specify): _______________    (   ) Hypotension due to (specify): _______________________________    (   ) Other (specify): __________________________________________                                                                                                                 [ x ] Clinically Undetermined

## 2020-03-16 NOTE — ED PROVIDER NOTES
Encounter Date: 3/16/2020       History     Chief Complaint   Patient presents with    Hematemesis     was at dialysis, got full treatment and started throwing up coffee ground emesis     The patient is a 54-year-old male who presents to the emergency department with nausea and vomiting.  According to EMS, the patient had already completed his full dialysis treatment and then began throwing up with coffee-ground emesis.  He does have a history of upper GI bleeds and is on Protonix.  Here in the emergency department, the patient is actively vomiting and unable to answer most questions.  He does deny abdominal pain, melena, or hematochezia.  The symptoms are severe.  They are constant.  He denies any other complaints.        Review of patient's allergies indicates:   Allergen Reactions    Fosrenol [lanthanum] Nausea And Vomiting     Nausea and vomiting     Past Medical History:   Diagnosis Date    Amputation stump pain 9/10/2013    Aspiration pneumonia 7/27/2015    Asterixis 11/8/2016    C. difficile colitis 8/7/2015    Cholelithiasis without obstruction 8/25/2015    Chronic diastolic heart failure     2-23-17   1 - Low normal to mildly depressed left ventricular systolic function (EF 50-55%).    2 - Right ventricular enlargement with mildly depressed systolic function.    3 - Left ventricular diastolic dysfunction.    4 - Right atrial enlargement.    5 - Severe tricuspid regurgitation.    6 - Pulmonary hypertension. The estimated PA systolic pressure is 86 mmHg.    7 - Increased central venous pressure.    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 8/1/2015    Metabolic acidosis, IAG, reduced excretion of inorganic acids     Myoclonic jerking 9/20/2016    Noncompliance with medication regimen 12/4/2018    Secondary hyperparathyroidism (of renal origin)     Secondary pulmonary hypertension 3/23/2017    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    TB lung, latent 08/25/2015    Negative Quantiferon Gold 3-23-17     Past Surgical History:   Procedure Laterality Date    COLONOSCOPY      COLONOSCOPY N/A 4/4/2017    Procedure: COLONOSCOPY;  Surgeon: Walker Stern MD;  Location: [facility] (2ND FLR);  Service: Endoscopy;  Laterality: N/A;  PA Systolic Pressure 85.56. HD Patient MWF, K+ lab prior to procedure.     ESOPHAGOGASTRODUODENOSCOPY N/A 6/12/2018    Procedure: EGD (ESOPHAGOGASTRODUODENOSCOPY);  Surgeon: Man Galicia MD;  [facility]  Service: Endoscopy;  Laterality: N/A;  EGD in 8-12 weeks with Dr. Galicia on 4th floor for follow up erosive esophagitis and Mejia's surveillance.    Patient should be on Pantoprazole 40mg every 12 hours or the equivulant of another PPI    awaiting for patient to reply back regarding changing    ESOPHAGOGASTRODUODENOSCOPY N/A 3/7/2019    Procedure: EGD (ESOPHAGOGASTRODUODENOSCOPY);  Surgeon: Man Galicia MD;  [facility]  Service: Endoscopy;  Laterality: N/A;    ESOPHAGOGASTRODUODENOSCOPY N/A 9/23/2019    Procedure: EGD (ESOPHAGOGASTRODUODENOSCOPY);  Surgeon: Keanu Rainey MD;  Location: [facility] (2ND FLR);  Service: Endoscopy;  Laterality: N/A;    ESOPHAGOGASTRODUODENOSCOPY N/A 10/2/2019    Procedure: EGD (ESOPHAGOGASTRODUODENOSCOPY);  Surgeon: Kevin De La Paz MD;  Location: [facility] (2ND FLR);  Service: Endoscopy;  Laterality: N/A;     ESOPHAGOGASTRODUODENOSCOPY N/A 11/12/2019    Procedure: EGD (ESOPHAGOGASTRODUODENOSCOPY);  Surgeon: Kevin De La Paz MD;  [facility]  Service: Endoscopy;  Laterality: N/A;    ESOPHAGOGASTRODUODENOSCOPY N/A 2/14/2020    Procedure: EGD (ESOPHAGOGASTRODUODENOSCOPY);  Surgeon: Kevin De La Paz MD;  [facility]  Service: Endoscopy;  Laterality: N/A;    FOOT AMPUTATION THROUGH METATARSAL      left foot    LEG AMPUTATION THROUGH KNEE  12/18/2013    left BKA    R AVF  9/12/12    UPPER GASTROINTESTINAL ENDOSCOPY       Family History   Problem Relation Age of Onset    Early death Mother     Kidney disease Father     Hypertension Father     Heart disease Father     Hyperlipidemia Father     Diabetes Brother     Alcohol abuse Maternal Grandmother     Diabetes Maternal Grandfather     Hypertension Sister     Melanoma Neg Hx      Social History     Tobacco Use    Smoking status: Former Smoker     Packs/day: 1.00     Years: 10.00     Pack years: 10.00    Smokeless tobacco: Never Used   Substance Use Topics    Alcohol use: No    Drug use: No     Review of Systems  I am unable to obtain a review of systems on this patient secondary to his clinical status.    Physical Exam     Initial Vitals [03/16/20 1258]   BP Pulse Resp Temp SpO2   (!) 140/88 101 14 98.9 °F (37.2 °C) 100 %      MAP       --         Physical Exam  General:  Moderate distress with active vomiting.  HENT: Moist mucous membranes.  Normocephalic atraumatic.    Eyes: Pupils equally round and reactive to light.  Extraocular movements intact.  No scleral icterus.  No conjunctival pallor.  Cardiovascular:  Regular rhythm.  Mild tachycardia no.  No murmurs, rubs, or gallops.  Brisk capillary fill.  2+ distal pulses.  Respiratory: Clear to auscultation bilaterally.  No wheezes, rales, or rhonchi.  No respiratory distress.  Abdomen: Soft.  Nontender.  Nondistended.  No guarding.  No rebound.  No masses.  No abdominal  bruit auscultated.  Feeding tube in place.  Skin: No rashes.  No lesions.  No pallor.  No jaundice.  No fluctuance, induration, or drainage noted at the feeding tube site.  Neuro: Cranial nerves II through XII grossly intact.  Moving all extremities equally.  No sensory deficits.  Strength 5 out of 5 in all 4 extremities.  Musculoskeletal: Neck supple.  No extremity tenderness.       ED Course   Procedures  Labs Reviewed   CBC W/ AUTO DIFFERENTIAL - Abnormal; Notable for the following components:       Result Value    RBC 4.51 (*)     Hemoglobin 12.9 (*)     Mean Corpuscular Hemoglobin Conc 30.9 (*)     RDW 18.7 (*)     Lymph # 0.9 (*)     Gran% 76.4 (*)     Lymph% 12.3 (*)     All other components within normal limits   COMPREHENSIVE METABOLIC PANEL - Abnormal; Notable for the following components:    Creatinine 4.1 (*)     Calcium 8.3 (*)     Total Protein 8.6 (*)     Albumin 2.9 (*)     Alkaline Phosphatase 196 (*)     ALT 9 (*)     eGFR if  17.7 (*)     eGFR if non  15.3 (*)     All other components within normal limits   PROTIME-INR   MAGNESIUM   TYPE & SCREEN   GROUP & RH          Imaging Results          X-Ray Abdomen Flat And Erect (Final result)  Result time 03/16/20 16:02:03    Final result by Yimi Sung Jr., MD (03/16/20 16:02:03)                 Impression:      Significant amount of feces in the colon.  No focal small bowel dilatation.      Electronically signed by: Yimi Sung MD  Date:    03/16/2020  Time:    16:02             Narrative:    EXAMINATION:  XR ABDOMEN FLAT AND ERECT    CLINICAL HISTORY:  Vomiting, unspecified    TECHNIQUE:  Flat and erect AP views of the abdomen were performed.    COMPARISON:  February 19, 2020.    FINDINGS:  G-tube in place.  Stool and bowel gas in the colon.  There may be some small bowel air though no focal dilatation.  Calcific density right upper quadrant likely a gallstone.  Vascular calcifications noted.                                  Medical Decision Making:   Initial Assessment:   This is an emergent evaluation of a critically ill patient.  The patient is actively vomiting with coffee-ground emesis.  My initial differential diagnosis includes upper GI bleed, hemodynamic instability, anemia, bowel obstruction, and gastritis.  Laboratory studies, abdominal x-ray, IV Zofran, and IV Protonix have been ordered.  I have significant concern for this patient's clinical status.  Bilateral large-bore IVs have been ordered.  ED Management:  2:12 p.m.  The patient is hemodynamically stable at this time.  Workup is in progress.    8:32 p.m.   The patient has had no vomiting in the 7 hr since he received Zofran.  He is completely asymptomatic at this time.  Specifically, the patient denies sinus congestion, runny nose, sore throat, fever, coughing, shortness of breath, abdominal pain, diarrhea, nausea, vomiting, recent travel, or sick contacts.  Abdominal exam is completely benign with no tenderness to palpation, guarding, or rebound.  The patient's laboratory studies show no clinically significant abnormalities.  In fact, his hemoglobin is above previous lab draws.  As he is not having any signs of active bleeding at this time, and he is already taking Protonix, I feel comfortable discharging this patient with close follow-up.                                 Clinical Impression:     1. Upper GI bleed    2. Hematemesis    3. Vomiting            Disposition:   Disposition: Discharged  Condition: Stable     ED Disposition Condition    Discharge Stable        ED Prescriptions     None        Follow-up Information     Follow up With Specialties Details Why Contact Info    Cori Randall MD Hospitalist In 2 days  [address]  [place] [address]  [phone]                                       Hakan Mcgarry MD  03/16/20 6523

## 2020-03-17 NOTE — ED NOTES
"Pt states, "I just started throwing up today after dialysis. It was around 11 something. Thrown up a good bit." coffee ground emesis noted on gown. Two emesis bags with coffee ground emesis noted in room. Pt confirmed that the emesis on his gown and in the bags is his. Denies any nausea or pain with vomiting. No further complaints. No acute distress noted. Stable condition.    "

## 2020-03-17 NOTE — PROGRESS NOTES
[facility]                                 Skilled Nursing Facility                   Progress Note     Admit Date: 2/18/20  JUAN   Principal Problem:  Debility r/t recent encephalopathy, seizures, aspiration pneumonia  HPI obtained from patient interview and chart review     Visit Date: 3/18/2020    Chief Complaint:   BP/HR monitoring,  PT/OT progression    HPI:   Mr. Retana is a 53 y/o male with a pertinent PMHx of Chronic diastolic heart failure, multiple intracranial hemorrhages with left hemiparesis, end-stage renal on HD with secondary hyperparathyroidism, GERD, hep C, HTN, left BKA 2/2 osteomyelitis, latent TB, pulmonary hypertension, noncompliance. He is a long term resident at this facility, and is returning to [facility] as skilled nursing after a hospitalization for new onset seizures. He was admitted to critical care with concerns of new onset seizures and s/p intubation for airway protection. Patient was started on vanc, rocephin, ampicillin, and acyclovir to cover for meningitis. MRI negative>Lumbar puncture was done after MRI>CSF labs not convincing for bacterial or viral meningitis and antibiotics were weaned down. Continuous EEG was done,showed epileptiform discharges. Epilepsy consulted during admit, tx with Keppra titrated to appropriate dose due to sedation.  Patient also experienced aspiration pneumonia, treated with Augmentin.  Nephrology followed for HD txs. Patient extubated, but experienced dysphagia afterwards, he is s/p PEG placement on 2/12 with TF's + dental soft diet. Initially GI recommended no EGD needed, but patient developed coffee-ground emesis, underwent EGD: showed normal duodenum, nonbleeding erosive gastropathy, 2 cm hiatal hernia that was biopsied, intact gastrostomy. He returns to facility under skilled nursing care due to debility.      Patient will be treated at [facility] with PT  and OT to improve functional status and ability to perform ADLs.     Interval history:   /70, HR 76, stable on current regimen.  Patient continuing to receive PEG tube feedings.  Patient continues with intermittent nausea/vomiting, seems to be related to dialysis days.  He continues on Keppra.  He has no new complaints today during visit.  No changes needed to plan of care at this time.  Patient medically complex.  Will continue to monitor and treat of chronic conditions.  Progressing slowly with PT/OT due to refusal of care at times.     Past Medical History: Patient has a past medical history of Amputation stump pain (9/10/2013), Aspiration pneumonia (7/27/2015), Asterixis (11/8/2016), C. difficile colitis (8/7/2015), Cholelithiasis without obstruction (8/25/2015), Chronic diastolic heart failure, Chronic low back pain (12/1/2015), Closed head injury (9/8/2016), DVT (deep venous thrombosis) (7/28/2017), ESRD on hemodialysis (2/7/2013), GERD (gastroesophageal reflux disease), HCV antibody positive, Hemiparesis affecting left side as late effect of stroke (11/08/2016), History of Intracerebral Hemorrhage: L BG 5/2013; R BG 9/2016; R BG 11/2016; L caudate head 2/2017 (11/2/2016), Hypertension, left basal ganglia ICH 5/2013 (11/2/2016), Left Caudate Head ICH 2/22/2017 (2/24/2017), 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    Past Surgical History: Patient has a past surgical history that includes R AVF (9/12/12); Leg amputation through knee (12/18/2013); Foot amputation through metatarsal; Colonoscopy; Colonoscopy (N/A, 4/4/2017); Esophagogastroduodenoscopy (N/A,  6/12/2018); Upper gastrointestinal endoscopy; Esophagogastroduodenoscopy (N/A, 3/7/2019); Esophagogastroduodenoscopy (N/A, 9/23/2019); Esophagogastroduodenoscopy (N/A, 10/2/2019); Esophagogastroduodenoscopy (N/A, 11/12/2019); and Esophagogastroduodenoscopy (N/A, 2/14/2020).    Social History: Patient reports that he has quit smoking. He has a 10.00 pack-year smoking history. He has never used smokeless tobacco. He reports that he does not drink alcohol or use drugs.    Family History: family history includes Alcohol abuse in his maternal grandmother; Diabetes in his brother and maternal grandfather; Early death in his mother; Heart disease in his father; Hyperlipidemia in his father; Hypertension in his father and sister; Kidney disease in his father.    Allergies: Patient is allergic to fosrenol [lanthanum].    ROS  Constitutional: Negative for fever or fatigue.   Eyes: Negative for blurred vision, double vision and discharge.   Respiratory: Negative for cough, shortness of breath and wheezing.    Cardiovascular: Negative for chest pain, palpitations, and leg swelling.   Gastrointestinal: Negative for abdominal pain, constipation, diarrhea, nausea and vomiting.   Genitourinary: Negative for dysuria, frequency and urgency, + PEG tube.   Musculoskeletal:  + generalized weakness. Negative for back pain and myalgias.   Skin: Negative for itching and rash.   Neurological: Negative for dizziness, speech change, and headaches.   Psychiatric/Behavioral: Negative for depression. The patient is not nervous/anxious.      PEx     Constitutional: Patient appears well-developed and in no distress   Head: Normocephalic and atraumatic.   Eyes: Pupils are equal, round, and reactive to light.   Neck: Normal range of motion. Neck supple.   Cardiovascular: Normal rate, regular rhythm and normal heart sounds.    Pulmonary/Chest: Effort normal and breath sounds are clear  Abdominal: Soft. Bowel sounds are normal, + PEG tube.    Musculoskeletal: Normal range of motion.   Neurological: Alert and oriented to person, place, and time.   Psychiatric: Normal mood and affect. Behavior is normal.   Skin: Skin is warm and dry.      Assessment and Plan:    Aftercare of Recent Seizures, ongoing  Aftercare of recent Acute encephalopathy, resolved  - tonic clonic seizure during admit>intubated> concern for status>Tx with multiple broad spectrum antibiotics/antivirals>BC neg> LP neg>Possibly multiple foci for seizure overtime>CT without acute changes, MRI brain with chronic changes and hypertensive angiopathy; no acute changes.   -Neurology consulted. repeat EEG on 01/27 shows diffuse slowing, though no focal activity seen on EEG, started on Keppra.   -continue keppra 250mg BID per neuro recs   -continue aspiration precautions, fall precautions, seizure precaution  -2/19 initiate Keppra level to be drawn in 1 month to monitor dose  -3/18 continue Keppra    Debility r/t recent Acute encephalopathy, seizures, ongoing  - Continue with PT/OT for gait training and strengthening and restoration of ADL's   - Encourage mobility, OOB in chair, and early ambulation as appropriate  - Fall precautions   - Monitor for bowel and bladder dysfunction  - Monitor for and prevent skin breakdown and pressure ulcers  - Continue Tylenol PRN pain  -3/18 continue PT/OT    Oropharyngeal dysphagia, ongoing  S/p peg tube placement on 02/12/20  -likely secondary to acute encephalopathy and possible critical illness myopathy/neuropathy  -IR was consulted, patient is s/p peg tube placement on 02/12/2020  -continue [employer] tube feeds plus diet my RD to evaluate-patient refusing  -2/26 patient refusing all tube feeds, nursing flushing tube to keep patent, patient will follow up with GI regarding need for continued PEG tube, he is tolerating p.o. Intake  -3/11 new RD recs:  Tube feeds scheduled from 7P-5A continuous at 50 cc/hour for nutritional support, p.o. diet during the  daytime  -3/18 continue current PEG tube feeding regimen plus diet    Aftercare of Gastrointestinal hemorrhage with hematemesis, improving  Anemia in chronic kidney disease r/t ESRD, ongoing  GERD with esophagitis, ongoing  -gastritis vs PUD vs esophagitis, vs MWT (in setting of vomiting)>multiple prior EGDs.   -Previous EGD 9/23, LA Grade D reflux esophagitis. Medium-sized hiatal hernia. No active GI bleed> received PRBC transfusions during recent stay  -EGD during admit 2/2020 findings: normal duodenum, nonbleeding erosive gastropathy, 2 cm hiatal hernia that was biopsied  -Continue Protonix 40mg BID   -Continue zofran PRN, Phenergan p.r.n.  -3/18 continue current regimen     HTN with chronic diastolic heart failure, ongoing  ESRD on hemodialysis, ongoing  Chronic kidney disease-mineral and bone disorder, ongoing  Hypoalbuminemia, ongoing  -Dialysis M/W/F via RICHARD AV fistula  -Continue Midodrine On MWF prior to HD ( previously on Coreg 3.125 mg PO BID)  -Continue renvela TID + renal vitamin q.d.  -2/19 initiate ProStat 30 mL p.o. b.i.d.  -3/18 continue nutritional support, hydralazine q.8 hours p.r.n. for blood pressure control, HD 3 times weekly    Continued    Gastroparesis, ongoing  Recent history of coffee-ground emesis requiring ED visit, resolved  -Previous NM gastric emptying study was done > suggestive of gastroparesis retaining 50% of his gastric contents 4 hours after meals> started PPI 40 mg BID, (previously on Reglan TID before meals, and Carafate BID)   -3/11 continue Protonix b.i.d.     Remote History of Intracerebral Hemorrhage, multiple, 5/2013; 9/2016; 11/2016; 2/2017  -Not currently on DAPT, or AC given h/o ICH/GIB.      Previous Left BKA 2/2 osteomyelitis  -continue prosthetic         After care of Recent Hx of Aspiration PNA, resolved  -CXR concerning of aspiration>started oral Augmentin renally dosed for 7 days total (2/19 - end date)          No future appointments.        Kimberly Hernandez,  NP          Patient note was created using MModal Dictation.  Any errors in syntax or even information may not have been identified and edited on initial review prior to signing this note.

## 2020-03-21 NOTE — ED NOTES
From [facility], arrived per [facility].  Onset with fever yesterday, highest 102.9 given Tylenol prior to EMS arrival.  Temp 100.2 upon arrival to ED. +cough per EMS.

## 2020-03-22 NOTE — ED NOTES
Patient informed that we area still waiting on transport back to his facility. No c/o pain or discomfort. Resp even and unlabored. No needs voiced at this time.

## 2020-03-22 NOTE — ED NOTES
Patient resting quietly in bed with sheet over his head. Responds to name called. Resp even and unlabored. Vitals stable. Afebrile. No s/s of discomfort.

## 2020-03-22 NOTE — ED PROVIDER NOTES
Chief complaint:  Nausea and vomiting.      HPI    Vaughn Retana 54 y.o. presents to the emergency department today with a complaint of nausea and vomiting.  Patient reports that this has been going on today.  He denies any diarrhea.  He denies abdominal pain.  According to nursing home he had a fever yesterday 102.9 for which he was given Tylenol.  Patient denies any cough or sputum production.  He denies any shortness of breath.  Denies chest pain.  Denies palpitations.       ROS    Constitutional: No fever, no chills.  Eyes: No discharge. No pain.  HENT: No nasal drainage. No ear ache. No sore throat.  Cardiovascular: No chest pain, no palpitations.  Respiratory:  See above.  Gastrointestinal:  See above  Genitourinary: No hematuria, dysuria, urgency.  Musculoskeletal: No back pain.   Skin: No rashes, no lesions.  Neurological: No headache, no focal weakness.    Otherwise remaining ROS negative     The history is provided by the patient      ALLERGIES REVIEWED  MEDICATIONS REVIEWED  PMH/PSH/SOC/FH REVIEWED       Past Medical History:   Diagnosis Date    Amputation stump pain 9/10/2013    Aspiration pneumonia 7/27/2015    Asterixis 11/8/2016    C. difficile colitis 8/7/2015    Cholelithiasis without obstruction 8/25/2015    Chronic diastolic heart failure     2-23-17   1 - Low normal to mildly depressed left ventricular systolic function (EF 50-55%).    2 - Right ventricular enlargement with mildly depressed systolic function.    3 - Left ventricular diastolic dysfunction.    4 - Right atrial enlargement.    5 - Severe tricuspid regurgitation.    6 - Pulmonary hypertension. The estimated PA systolic pressure is 86 mmHg.    7 - Increased central venous pressure.    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 8/1/2015    Metabolic acidosis, IAG, reduced excretion of inorganic acids     Myoclonic jerking 9/20/2016    Noncompliance with medication regimen 12/4/2018    Secondary hyperparathyroidism (of renal origin)     Secondary pulmonary hypertension 3/23/2017    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    TB lung, latent 08/25/2015    Negative Quantiferon Gold 3-23-17         Past Surgical History:   Procedure Laterality Date    COLONOSCOPY      COLONOSCOPY N/A 4/4/2017    Procedure: COLONOSCOPY;  Surgeon: Walker Stern MD;  Location: [facility] ([address] [place] [address]);  Service: Endoscopy;  Laterality: N/A;  PA Systolic Pressure 85.56. HD Patient MWF, K+ lab prior to procedure.     ESOPHAGOGASTRODUODENOSCOPY N/A 6/12/2018    Procedure: EGD (ESOPHAGOGASTRODUODENOSCOPY);  Surgeon: Man Galicia MD;  [facility]  Service: Endoscopy;  Laterality: N/A;  EGD in 8-12 weeks with Dr. Galicia on 4th floor for follow up erosive esophagitis and Mejia's surveillance.    Patient should be on Pantoprazole 40mg every 12 hours or the equivulant of another PPI    awaiting for patient to reply back regarding changing    ESOPHAGOGASTRODUODENOSCOPY N/A 3/7/2019    Procedure: EGD (ESOPHAGOGASTRODUODENOSCOPY);  Surgeon: Man Galicia MD;  Location: [facility] (2ND FLR);  Service: Endoscopy;  Laterality: N/A;    ESOPHAGOGASTRODUODENOSCOPY N/A 9/23/2019    Procedure: EGD (ESOPHAGOGASTRODUODENOSCOPY);  Surgeon: Keanu Rainey MD;  Location: [facility] (2ND FLR);  Service: Endoscopy;  Laterality: N/A;    ESOPHAGOGASTRODUODENOSCOPY N/A 10/2/2019     "Procedure: EGD (ESOPHAGOGASTRODUODENOSCOPY);  Surgeon: Kevin De La Paz MD;  Location: [facility] (2ND FLR);  Service: Endoscopy;  Laterality: N/A;    ESOPHAGOGASTRODUODENOSCOPY N/A 11/12/2019    Procedure: EGD (ESOPHAGOGASTRODUODENOSCOPY);  Surgeon: Kevin De La Paz MD;  Location: [facility] (2ND FLR);  Service: Endoscopy;  Laterality: N/A;    ESOPHAGOGASTRODUODENOSCOPY N/A 2/14/2020    Procedure: EGD (ESOPHAGOGASTRODUODENOSCOPY);  Surgeon: Kevin De La Paz MD;  Location: [facility] (2ND FLR);  Service: Endoscopy;  Laterality: N/A;    FOOT AMPUTATION THROUGH METATARSAL      left foot    LEG AMPUTATION THROUGH KNEE  12/18/2013    left BKA    R AVF  9/12/12    UPPER GASTROINTESTINAL ENDOSCOPY           Social History     Tobacco Use    Smoking status: Former Smoker     Packs/day: 1.00     Years: 10.00     Pack years: 10.00    Smokeless tobacco: Never Used   Substance Use Topics    Alcohol use: No    Drug use: No       Family History   Problem Relation Age of Onset    Early death Mother     Kidney disease Father     Hypertension Father     Heart disease Father     Hyperlipidemia Father     Diabetes Brother     Alcohol abuse Maternal Grandmother     Diabetes Maternal Grandfather     Hypertension Sister     Melanoma Neg Hx        Nursing/Ancillary staff note reviewed.  VS reviewed         Physical Exam     BP (!) 148/85 (BP Location: Left arm, Patient Position: Lying)   Pulse 87   Temp 98.6 °F (37 °C) (Oral)   Resp 17   Ht 5' 6" (1.676 m)   Wt 54.4 kg (120 lb)   SpO2 100%   BMI 19.37 kg/m²     Physical Exam    General Appearance: The patient is alert, has no immediate need for airway protection and no signs of toxicity. No acute distress. Lying in bed but able to sit up without difficulty.   HEENT:  Head:  Normocephalic atraumatic.      Eyes: Pupils equal and round no pallor or injection. Extra ocular movements intact. No drainage.       Mouth: Mucous membranes are moist. Oropharynx clear. "   Neck:Neck is supple non-tender. No lymphadenopathy. No stridor.   Respiratory: There are no retractions, lungs are clear to auscultation. No wheezing, no crackles. Chest wall nontender to palpation.   Cardiovascular: Regular rate and rhythm. No murmurs, rubs or gallops.  Gastrointestinal:  Abdomen is soft and non-tender, no masses, bowel sounds normal. No guarding, no rebound.  No pulsatile mass.   Neurological: Alert and oriented x 4. CN II-XII grossly intact. No focal weakness. Strength intact 5/5 bilaterally in upper and lower extremities.   Skin: Warm and dry, no rashes.  Musculoskeletal:Musculoskeletal: Extremities are non-tender, non-swollen and have full range of motion. Back NTTP along the midline.     DIFFERENTIAL DIAGNOSIS: After history and physical exam a differential diagnosis was considered, but was not limited to,  gastritis, gastroenteritis, pancreatitis, cholecystitis, ileus, small bowel obstruction, appendicitis.          Initial management:  This is a 54-year-old male presents to the emergency department today with complaint of nausea and vomiting.  Patient had fever yesterday.  Here he is 100.2.  He reports that he has no complaints other than nausea and vomiting.  Will obtain labs, give antiemetics, monitor and reassess.          Labs Reviewed   CBC W/ AUTO DIFFERENTIAL - Abnormal; Notable for the following components:       Result Value    RBC 3.29 (*)     Hemoglobin 9.4 (*)     Hematocrit 30.2 (*)     Mean Corpuscular Hemoglobin Conc 31.1 (*)     RDW 17.8 (*)     All other components within normal limits   COMPREHENSIVE METABOLIC PANEL - Abnormal; Notable for the following components:    BUN, Bld 23 (*)     Creatinine 5.2 (*)     Calcium 7.9 (*)     Albumin 2.6 (*)     Alkaline Phosphatase 137 (*)     eGFR if  13 (*)     eGFR if non  11 (*)     All other components within normal limits   PROCALCITONIN - Abnormal; Notable for the following components:     Procalcitonin 0.81 (*)     All other components within normal limits   INFLUENZA A & B BY MOLECULAR   CULTURE, BLOOD   CULTURE, BLOOD   LACTIC ACID, PLASMA   MAGNESIUM   PHOSPHORUS         X-Ray Chest AP Portable   Final Result      1. Coarse interstitial attenuation, similar to the previous examination.  There is questionable increased parenchymal attenuation projected over the left mid to lower lung zone versus nonspecific vascular crowding suggest atelectasis.  Correlation is advised, PA and lateral as warranted.         Electronically signed by: Wade Edward MD   Date:    2020   Time:    18:55          EK beats per minute, normal sinus rhythm, incomplete right bundle branch block, no ST elevations, similar to previous          ED Course     ED Course as of Mar 21 2100   Sat Mar 21, 2020   1945 The patient's vomiting has resolved here in the emergency department.  He is feeling improved.  He does not have need for admission at this time.  He is oxygenating 100%.  He does not have signs of hyperkalemia.  I will discharge home with symptom control and he will follow up with his PCP.    [JA]    WBC: 5.30 [JA]    Lactate, Aydin: 1.3 [JA]    Influenza A, Molecular: Negative [JA]    Influenza B, Molecular: Negative [JA]    CXR:Coarse interstitial attenuation, similar to the previous examination.    [JA]      ED Course User Index  [JA] Marisela Donnelly MD              Medical Decision Making:   History:   I obtained history from: EMS provider and someone other than patient.  Old Medical Records: I decided to obtain old medical records.    Independently Interpreted Test(s):   I have ordered and independently interpreted EKG Reading(s) - see prior notes  Clinical Tests:   Lab Tests: Ordered and Reviewed  Radiological Study: Ordered and Reviewed  Medical Tests: Ordered and Reviewed    ED Management:  Vaughn Retana  presents to the emergency Department today with  nausea and vomiting.   He has obtain significant relief here in the emergency department.  Patient is not hyperkalemic.  He does not have any signs of hypoxia here in the emergency department.  He has remained afebrile.  I will discharge home with symptom control him follow up with PCP.  The pt is comfortable with this plan and comfortable going home at this time. After taking into careful account the historical factors and physical exam findings of the patient's presentation today, in conjunction with the empirical and objective data obtained on ED workup, no acute emergent medical condition requiring admission has been identified. The patient appears to be low risk for an emergent medical condition and I feel it is safe and appropriate at this time for the patient to be discharged to follow-up as detailed in their discharge instructions for reevaluation and possible continued outpatient workup and management. Regardless, an unremarkable evaluation in the ED does not preclude the development or presence of a serious or life threatening condition. As such, patient was instructed to return immediately for any worsening or change in current symptoms. Precautions for return discussed at length.  Discharge and follow-up instructions discussed with the patient who expressed understanding and willingness to comply with my recommendations.    Voice recognition software utilized in this note.              Impression      The primary encounter diagnosis was Fever. A diagnosis of Non-intractable vomiting with nausea, unspecified vomiting type was also pertinent to this visit.                Current Discharge Medication List      START taking these medications    Details   !! promethazine (PHENERGAN) 25 MG tablet Take 1 tablet (25 mg total) by mouth every 6 (six) hours as needed for Nausea.  Qty: 15 tablet, Refills: 0       !! - Potential duplicate medications found. Please discuss with provider.                     Marisela Donnelly MD  03/21/20  2100

## 2020-03-24 NOTE — PROGRESS NOTES
[facility]                                 Skilled Nursing [facility]                   Progress Note     Admit Date: 2/18/20  JUAN   Principal Problem:  Debility r/t recent encephalopathy, seizures, aspiration pneumonia  HPI obtained from patient interview and chart review     Visit Date: 3/25/2020    Chief Complaint:   Return from ED visit/Send back to ED, fever ,BP/HR monitoring,  PT/OT progression    HPI:   Mr. Retana is a 53 y/o male with a pertinent PMHx of Chronic diastolic heart failure, multiple intracranial hemorrhages with left hemiparesis, end-stage renal on HD with secondary hyperparathyroidism, GERD, hep C, HTN, left BKA 2/2 osteomyelitis, latent TB, pulmonary hypertension, noncompliance. He is a long term resident at this facility, and is returning to [facility] as skilled nursing after a hospitalization for new onset seizures. He was admitted to critical care with concerns of new onset seizures and s/p intubation for airway protection. Patient was started on vanc, rocephin, ampicillin, and acyclovir to cover for meningitis. MRI negative>Lumbar puncture was done after MRI>CSF labs not convincing for bacterial or viral meningitis and antibiotics were weaned down. Continuous EEG was done,showed epileptiform discharges. Epilepsy consulted during admit, tx with Keppra titrated to appropriate dose due to sedation.  Patient also experienced aspiration pneumonia, treated with Augmentin.  Nephrology followed for HD txs. Patient extubated, but experienced dysphagia afterwards, he is s/p PEG placement on 2/12 with TF's + dental soft diet. Initially GI recommended no EGD needed, but patient developed coffee-ground emesis, underwent EGD: showed normal duodenum, nonbleeding erosive gastropathy, 2 cm hiatal hernia that was biopsied, intact gastrostomy. He returns to facility under skilled nursing care due to debility.      Patient will be  treated at [facility] with PT and OT to improve functional status and ability to perform ADLs.     Interval history:   Patient seen today after return from ED visit on 03/20 due to temp of 102.9°, shortness of breath requiring nasal cannula O2, nausea after HD.  Prior to patient being transferred to ED was given a 1 time dose of Rocephin 1 g IM.  He was sent to the ED due to acute declining condition and current COVID 19 pandemic in area.  ED visit provider notes reviewed, pertinent testing/imaging reviewed, patient's workup was basically unrevealing, he was sent back with Phenergan prescription, which he is already taking.  He did not have fever in the ED but continues with temperatures of 102 on skilled.  Blood cultures x2, lab work completed in ED, final results still pending but preliminary are negative.  CXR negative.  Patient continues on nasal cannula O2, reporting intermittent shortness of breath.  He continues to be dialyzed, no extra edema anywhere noted.  Decision was made to treat patient with antibiotics due to continued fever.  Most recent temp 102°, O2 sats 99% on nasal cannula O2.  BP//74, 68, stable.  Also new since return to facility, patient apparently missed HD at outpatient facility today due to refusing to go because he could not find his clothes to change into.  Discussed with MD, that continues despite multiple antibiotic treatments, will send patient back to ED for continued workup an inpatient dialysis.  Patient continuing to receive PEG tube feedings.   He continues on Keppra.  He has no new complaints today during visit.  No changes needed to plan of care at this time.  Patient medically complex.  Will continue to monitor and treat of chronic conditions.  Progressing slowly with PT/OT due to refusal of care at times.     Past Medical History: Patient has a past medical history of Amputation stump pain (9/10/2013), Aspiration pneumonia (7/27/2015), Asterixis  (11/8/2016), C. difficile colitis (8/7/2015), Cholelithiasis without obstruction (8/25/2015), Chronic diastolic heart failure, Chronic low back pain (12/1/2015), Closed head injury (9/8/2016), DVT (deep venous thrombosis) (7/28/2017), ESRD on hemodialysis (2/7/2013), GERD (gastroesophageal reflux disease), HCV antibody positive, Hemiparesis affecting left side as late effect of stroke (11/08/2016), History of Intracerebral Hemorrhage: L BG 5/2013; R BG 9/2016; R BG 11/2016; L caudate head 2/2017 (11/2/2016), Hypertension, left basal ganglia ICH 5/2013 (11/2/2016), Left Caudate Head ICH 2/22/2017 (2/24/2017), 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    Past Surgical History: Patient has a past surgical history that includes R AVF (9/12/12); Leg amputation through knee (12/18/2013); Foot amputation through metatarsal; Colonoscopy; Colonoscopy (N/A, 4/4/2017); Esophagogastroduodenoscopy (N/A, 6/12/2018); Upper gastrointestinal endoscopy; Esophagogastroduodenoscopy (N/A, 3/7/2019); Esophagogastroduodenoscopy (N/A, 9/23/2019); Esophagogastroduodenoscopy (N/A, 10/2/2019); Esophagogastroduodenoscopy (N/A, 11/12/2019); and Esophagogastroduodenoscopy (N/A, 2/14/2020).    Social History: Patient reports that he has quit smoking. He has a 10.00 pack-year smoking history. He has never used smokeless tobacco. He reports that he does not drink alcohol or use drugs.    Family History: family history includes Alcohol abuse in his maternal grandmother; Diabetes in his brother and maternal grandfather; Early death in his mother; Heart disease in his father; Hyperlipidemia in his father;  Hypertension in his father and sister; Kidney disease in his father.    Allergies: Patient is allergic to fosrenol [lanthanum].    ROS  Constitutional: Negative for fever or fatigue.   Eyes: Negative for blurred vision, double vision and discharge.   Respiratory: Negative for cough, shortness of breath and wheezing.    Cardiovascular: Negative for chest pain, palpitations, and leg swelling.   Gastrointestinal: Negative for abdominal pain, constipation, diarrhea, nausea and vomiting.   Genitourinary: Negative for dysuria, frequency and urgency, + PEG tube.   Musculoskeletal:  + generalized weakness. Negative for back pain and myalgias.   Skin: Negative for itching and rash.   Neurological: Negative for dizziness, speech change, and headaches.   Psychiatric/Behavioral: Negative for depression. The patient is not nervous/anxious.      PEx     Constitutional: Patient appears well-developed and in no distress   Head: Normocephalic and atraumatic.   Eyes: Pupils are equal, round, and reactive to light.   Neck: Normal range of motion. Neck supple.   Cardiovascular: Normal rate, regular rhythm and normal heart sounds.    Pulmonary/Chest: Effort normal and breath sounds are clear  Abdominal: Soft. Bowel sounds are normal, + PEG tube.   Musculoskeletal: Normal range of motion.   Neurological: Alert and oriented to person, place, and time.   Psychiatric: Normal mood and affect. Behavior is normal.   Skin: Skin is warm and dry.      Assessment and Plan:    Fever of unknown source, SOB, Nausea, continued  Recent ED visit for change in condition, new  - Patient sent to ED on 3/20 due to temp of 102.9, SOB, Nausea post HD, tx with Rocephin 1 G IM x 1 dose  -3/25 Initiate send to ED, Previously Initiated Doxycycline 100 mg PO Q12H x 5 days for tx of possible URI, Initiated Rocephin 1 G IM x daily x 2 more doses for broad spectrum antibiotics, continue NC O2 for now, continue isolation to monitor symptoms for now due to COVID 19  precautions, ED did not test patient during recent visit    Aftercare of Recent Seizures, ongoing  Aftercare of recent Acute encephalopathy, resolved  - tonic clonic seizure during admit>intubated> concern for status>Tx with multiple broad spectrum antibiotics/antivirals>BC neg> LP neg>Possibly multiple foci for seizure overtime>CT without acute changes, MRI brain with chronic changes and hypertensive angiopathy; no acute changes.   -Neurology consulted. repeat EEG on 01/27 shows diffuse slowing, though no focal activity seen on EEG, started on Keppra.   -continue keppra 250mg BID per neuro recs   -continue aspiration precautions, fall precautions, seizure precaution  -2/19 initiate Keppra level to be drawn in 1 month to monitor dose  -3/25 continue Keppra    Debility r/t recent Acute encephalopathy, seizures, ongoing  - Continue with PT/OT for gait training and strengthening and restoration of ADL's   - Encourage mobility, OOB in chair, and early ambulation as appropriate  - Fall precautions   - Monitor for bowel and bladder dysfunction  - Monitor for and prevent skin breakdown and pressure ulcers  - Continue Tylenol PRN pain  -3/25 continue PT/OT    Oropharyngeal dysphagia, ongoing  S/p peg tube placement on 02/12/20  -likely secondary to acute encephalopathy and possible critical illness myopathy/neuropathy  -IR was consulted, patient is s/p peg tube placement on 02/12/2020  -continue [employer] tube feeds plus diet my RD to evaluate-patient refusing  -2/26 patient refusing all tube feeds, nursing flushing tube to keep patent, patient will follow up with GI regarding need for continued PEG tube, he is tolerating p.o. Intake  -3/11 new RD recs:  Tube feeds scheduled from 7P-5A continuous at 50 cc/hour for nutritional support, p.o. diet during the daytime  -3/25 continue current PEG tube feeding regimen plus diet    Aftercare of Gastrointestinal hemorrhage with hematemesis, improving  Anemia in chronic kidney  disease r/t ESRD, ongoing  GERD with esophagitis, ongoing  -gastritis vs PUD vs esophagitis, vs MWT (in setting of vomiting)>multiple prior EGDs.   -Previous EGD 9/23, LA Grade D reflux esophagitis. Medium-sized hiatal hernia. No active GI bleed> received PRBC transfusions during recent stay  -EGD during admit 2/2020 findings: normal duodenum, nonbleeding erosive gastropathy, 2 cm hiatal hernia that was biopsied  -Continue Protonix 40mg BID   -Continue zofran PRN, Phenergan p.r.n.  -3/25 continue current regimen     HTN with chronic diastolic heart failure, ongoing  ESRD on hemodialysis, ongoing  Chronic kidney disease-mineral and bone disorder, ongoing  Hypoalbuminemia, ongoing  -Dialysis M/W/F via RICHARD AV fistula  -Continue Midodrine On MWF prior to HD ( previously on Coreg 3.125 mg PO BID)  -Continue renvela TID + renal vitamin q.d.  -2/19 initiate ProStat 30 mL p.o. b.i.d.  -3/25 continue nutritional support, hydralazine q.8 hours p.r.n. for blood pressure control, HD 3 times weekly    Continued    Gastroparesis, ongoing  Recent history of coffee-ground emesis requiring ED visit, resolved  -Previous NM gastric emptying study was done > suggestive of gastroparesis retaining 50% of his gastric contents 4 hours after meals> started PPI 40 mg BID, (previously on Reglan TID before meals, and Carafate BID)   -3/11 continue Protonix b.i.d.     Remote History of Intracerebral Hemorrhage, multiple, 5/2013; 9/2016; 11/2016; 2/2017  -Not currently on DAPT, or AC given h/o ICH/GIB.      Previous Left BKA 2/2 osteomyelitis  -continue prosthetic         After care of Recent Hx of Aspiration PNA, resolved  -CXR concerning of aspiration>started oral Augmentin renally dosed for 7 days total (2/19 - end date)          No future appointments.    Extended time visit:  Total time: 67 minutes  Start Time:0800  End Time:0907  Non face-to-face time:40 minutes  Description of time:review over ED visit, provider notes, pertinent  testing/imaging, coordination of care at facility for isolation precautions     Kimberly Hernandez NP          Patient note was created using MModal Dictation.  Any errors in syntax or even information may not have been identified and edited on initial review prior to signing this note.

## 2020-03-25 NOTE — CONSULTS
NEPHROLOGY CONSULT NOTE    HPI & INTERVAL HISTORY:    Past Medical History:   Diagnosis Date    Amputation stump pain 9/10/2013    Aspiration pneumonia 7/27/2015    Asterixis 11/8/2016    C. difficile colitis 8/7/2015    Cholelithiasis without obstruction 8/25/2015    Chronic diastolic heart failure     2-23-17   1 - Low normal to mildly depressed left ventricular systolic function (EF 50-55%).    2 - Right ventricular enlargement with mildly depressed systolic function.    3 - Left ventricular diastolic dysfunction.    4 - Right atrial enlargement.    5 - Severe tricuspid regurgitation.    6 - Pulmonary hypertension. The estimated PA systolic pressure is 86 mmHg.    7 - Increased central venous pressure.    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    Metabolic acidosis, IAG, reduced excretion of inorganic acids     Myoclonic jerking 9/20/2016    Noncompliance with medication regimen 12/4/2018    Secondary hyperparathyroidism (of renal origin)     Secondary pulmonary hypertension 3/23/2017    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    TB lung, latent 08/25/2015    Negative Quantiferon Gold 3-23-17      Past Surgical History:   Procedure Laterality Date    COLONOSCOPY      COLONOSCOPY N/A 4/4/2017    Procedure: COLONOSCOPY;  Surgeon: Walker Stern MD;  Location: [facility] ([address] [place] [address]);  Service: Endoscopy;  Laterality: N/A;  PA  Systolic Pressure 85.56. HD Patient MWF, K+ lab prior to procedure.     ESOPHAGOGASTRODUODENOSCOPY N/A 6/12/2018    Procedure: EGD (ESOPHAGOGASTRODUODENOSCOPY);  Surgeon: Man Galicia MD;  [facility]  Service: Endoscopy;  Laterality: N/A;  EGD in 8-12 weeks with Dr. Galicia on 4th floor for follow up erosive esophagitis and Mejia's surveillance.    Patient should be on Pantoprazole 40mg every 12 hours or the equivulant of another PPI    awaiting for patient to reply back regarding changing    ESOPHAGOGASTRODUODENOSCOPY N/A 3/7/2019    Procedure: EGD (ESOPHAGOGASTRODUODENOSCOPY);  Surgeon: Man Galicia MD;  [facility]  Service: Endoscopy;  Laterality: N/A;    ESOPHAGOGASTRODUODENOSCOPY N/A 9/23/2019    Procedure: EGD (ESOPHAGOGASTRODUODENOSCOPY);  Surgeon: Keanu Rainey MD;  [facility]  Service: Endoscopy;  Laterality: N/A;    ESOPHAGOGASTRODUODENOSCOPY N/A 10/2/2019    Procedure: EGD (ESOPHAGOGASTRODUODENOSCOPY);  Surgeon: Kevin De La Paz MD;  Location: [facility] ([address] [place] [address]);  Service: Endoscopy;  Laterality: N/A;    ESOPHAGOGASTRODUODENOSCOPY N/A 11/12/2019    Procedure: EGD (ESOPHAGOGASTRODUODENOSCOPY);  Surgeon: Kevin De La Paz MD;  [facility]  Service: Endoscopy;  Laterality: N/A;    ESOPHAGOGASTRODUODENOSCOPY N/A 2/14/2020    Procedure: EGD (ESOPHAGOGASTRODUODENOSCOPY);  Surgeon: Kevin De La Paz MD;  [facility]  Service: Endoscopy;  Laterality: N/A;    FOOT AMPUTATION THROUGH METATARSAL      left foot    LEG AMPUTATION THROUGH KNEE  12/18/2013    left BKA    R AVF  9/12/12    UPPER GASTROINTESTINAL ENDOSCOPY        Review of patient's allergies indicates:   Allergen Reactions    Fosrenol [lanthanum] Nausea And Vomiting     Nausea and vomiting        (Not in a hospital admission)    Social History     Socioeconomic History    Marital status:      Spouse name: Not on file     Number of children: Not on file    Years of education: Not on file    Highest education level: Not on file   Occupational History    Not on file   Social Needs    Financial resource strain: Not on file    Food insecurity:     Worry: Not on file     Inability: Not on file    Transportation needs:     Medical: Not on file     Non-medical: Not on file   Tobacco Use    Smoking status: Former Smoker     Packs/day: 1.00     Years: 10.00     Pack years: 10.00    Smokeless tobacco: Never Used   Substance and Sexual Activity    Alcohol use: No    Drug use: No    Sexual activity: Yes     Partners: Female     Birth control/protection: None   Lifestyle    Physical activity:     Days per week: Not on file     Minutes per session: Not on file    Stress: Not on file   Relationships    Social connections:     Talks on phone: Not on file     Gets together: Not on file     Attends Religion service: Not on file     Active member of club or organization: Not on file     Attends meetings of clubs or organizations: Not on file     Relationship status: Not on file   Other Topics Concern    Not on file   Social History Narrative    Not on file        MEDS   sodium chloride 0.9%   Intravenous Once    sodium chloride 0.9%   Intravenous Once    mupirocin   Nasal BID               CONTINOUS INFUSIONS:    No intake or output data in the 24 hours ending 03/25/20 1531     HEMODYNAMICS:    Temp:  [97.8 °F (36.6 °C)] 97.8 °F (36.6 °C)  Pulse:  [78-80] 78  Resp:  [15-16] 15  SpO2:  [100 %] 100 %  BP: (121)/(73-75) 121/75   Gen: NAD  No SOB  No Cough  No CP  No nausea  No diarrhea  No vomiting  No fever   No chills  Cards:Pulse 78  Pulmonary:RR 15  Poxy 100%  Extremities :no edema     Lab Results   Component Value Date    WBC 4.39 03/25/2020    HGB 10.2 (L) 03/25/2020    HCT 32.4 (L) 03/25/2020    MCV 89 03/25/2020     03/25/2020        Recent Labs   Lab 03/25/20  1401   *   CALCIUM 7.7*   ALBUMIN 2.5*   PROT 7.6    *   K 3.3*   CO2 25   CL 96   BUN 38*   CREATININE 7.6*   ALKPHOS 103   ALT 7*   AST 28   BILITOT 0.3      Lab Results   Component Value Date    .0 (H) 01/22/2020    CALCIUM 7.7 (L) 03/25/2020    CAION 0.75 (L) 04/03/2018    PHOS 3.1 03/21/2020      Lab Results   Component Value Date    IRON 23 (L) 10/01/2019    TIBC 178 (L) 10/01/2019    FERRITIN 479 (H) 10/01/2019        ABG  No results for input(s): PH, PO2, PCO2, HCO3, BE in the last 168 hours.      IMAGING:  CXR    ASSESSMENT / PLAN  ESRD  K 3.3  Anemia secondary to ESRD  Hb 10.2  Metabolic bone disease  Poor nutrition  Albumin 2.5  /75, 150/70  CXR         No convincing cardiopulmonary abnormality and no detrimental change.  Few increased markings in the left retrocardiac region likely similar.   Poxy 100 %  Dialysis

## 2020-03-25 NOTE — ED PROVIDER NOTES
Encounter Date: 3/25/2020       History     Chief Complaint   Patient presents with    Fever     Patient was sent from [facility] for fever of unknown origin for several days,  Patient is presently on Doxy and Rocephine.  Patient missed dialysis today.     54-year-old male sent to the emergency department from [facility] for fever.  Patient was scheduled for dialysis but was declined dialysis because of fever.  He was given some Tylenol and his fever has resolved.  Patient was recently seen for cough, congestion and had a corona virus test ordered which it is unclear that was carried out.  Patient is unsure if that test was performed or not.  At this time he denies any symptoms.        Review of patient's allergies indicates:   Allergen Reactions    Fosrenol [lanthanum] Nausea And Vomiting     Nausea and vomiting     Past Medical History:   Diagnosis Date    Amputation stump pain 9/10/2013    Aspiration pneumonia 7/27/2015    Asterixis 11/8/2016    C. difficile colitis 8/7/2015    Cholelithiasis without obstruction 8/25/2015    Chronic diastolic heart failure     2-23-17   1 - Low normal to mildly depressed left ventricular systolic function (EF 50-55%).    2 - Right ventricular enlargement with mildly depressed systolic function.    3 - Left ventricular diastolic dysfunction.    4 - Right atrial enlargement.    5 - Severe tricuspid regurgitation.    6 - Pulmonary hypertension. The estimated PA systolic pressure is 86 mmHg.    7 - Increased central venous pressure.    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 8/1/2015    Metabolic acidosis, IAG, reduced excretion of inorganic acids     Myoclonic jerking 9/20/2016    Noncompliance with medication regimen 12/4/2018    Secondary hyperparathyroidism (of renal origin)     Secondary pulmonary hypertension 3/23/2017    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    TB lung, latent 08/25/2015    Negative Quantiferon Gold 3-23-17     Past Surgical History:   Procedure Laterality Date    COLONOSCOPY      COLONOSCOPY N/A 4/4/2017    Procedure: COLONOSCOPY;  Surgeon: Walker Stern MD;  Location: [facility] ENDO (2ND FLR);  Service: Endoscopy;  Laterality: N/A;  PA Systolic Pressure 85.56. HD Patient MWF, K+ lab prior to procedure.     ESOPHAGOGASTRODUODENOSCOPY N/A 6/12/2018    Procedure: EGD (ESOPHAGOGASTRODUODENOSCOPY);  Surgeon: Man Galicia MD;  Location: [facility] (2ND FLR);  Service: Endoscopy;  Laterality: N/A;  EGD in 8-12 weeks with Dr. Galicia on 4th floor for follow up erosive esophagitis and Mejia's surveillance.    Patient should be on Pantoprazole 40mg every 12 hours or the equivulant of another PPI    awaiting for patient to reply back regarding changing    ESOPHAGOGASTRODUODENOSCOPY N/A 3/7/2019    Procedure: EGD (ESOPHAGOGASTRODUODENOSCOPY);  Surgeon: Man Galicia MD;  Location: [facility] (2ND FLR);  Service: Endoscopy;  Laterality: N/A;    ESOPHAGOGASTRODUODENOSCOPY N/A 9/23/2019    Procedure: EGD (ESOPHAGOGASTRODUODENOSCOPY);  Surgeon: Keanu Rainey MD;  Location: [facility] END[facility] (2ND FLR);  Service: Endoscopy;  Laterality: N/A;    ESOPHAGOGASTRODUODENOSCOPY N/A 10/2/2019    Procedure: EGD (ESOPHAGOGASTRODUODENOSCOPY);  Surgeon: Kevin De La Paz MD;  Location: [facility] END[facility] (2ND FLR);  Service: Endoscopy;  Laterality: N/A;    ESOPHAGOGASTRODUODENOSCOPY N/A 11/12/2019     Procedure: EGD (ESOPHAGOGASTRODUODENOSCOPY);  Surgeon: Kevin De La Paz MD;  Location: [facility] ([address] [place] [address]);  Service: Endoscopy;  Laterality: N/A;    ESOPHAGOGASTRODUODENOSCOPY N/A 2/14/2020    Procedure: EGD (ESOPHAGOGASTRODUODENOSCOPY);  Surgeon: Kevin De La Paz MD;  [address]  Service: Endoscopy;  Laterality: N/A;    FOOT AMPUTATION THROUGH METATARSAL      left foot    LEG AMPUTATION THROUGH KNEE  12/18/2013    left BKA    R AVF  9/12/12    UPPER GASTROINTESTINAL ENDOSCOPY       Family History   Problem Relation Age of Onset    Early death Mother     Kidney disease Father     Hypertension Father     Heart disease Father     Hyperlipidemia Father     Diabetes Brother     Alcohol abuse Maternal Grandmother     Diabetes Maternal Grandfather     Hypertension Sister     Melanoma Neg Hx      Social History     Tobacco Use    Smoking status: Former Smoker     Packs/day: 1.00     Years: 10.00     Pack years: 10.00    Smokeless tobacco: Never Used   Substance Use Topics    Alcohol use: No    Drug use: No     Review of Systems   Constitutional: Positive for fever. Negative for chills and fatigue.   HENT: Negative for congestion, sore throat and voice change.    Respiratory: Positive for cough. Negative for shortness of breath.    Cardiovascular: Negative for chest pain.   Gastrointestinal: Negative for abdominal pain, diarrhea and vomiting.   Neurological: Negative for light-headedness, numbness and headaches.       Physical Exam     Initial Vitals   BP Pulse Resp Temp SpO2   03/25/20 1409 03/25/20 1402 03/25/20 1402 03/25/20 1402 03/25/20 1402   121/73 80 16 97.8 °F (36.6 °C) 100 %      MAP       --                Physical Exam    Nursing note and vitals reviewed.  Constitutional: He appears well-developed and well-nourished. No distress.   HENT:   Head: Normocephalic and atraumatic.   Eyes: Conjunctivae and EOM are normal. Pupils are equal, round, and reactive to light.   Neck:  Normal range of motion. Neck supple. No tracheal deviation present.   Cardiovascular: Normal rate and intact distal pulses.   Pulmonary/Chest: No respiratory distress.   Musculoskeletal: Normal range of motion. He exhibits no tenderness.   Neurological: He is alert. He has normal strength. No cranial nerve deficit. GCS score is 15. GCS eye subscore is 4. GCS verbal subscore is 5. GCS motor subscore is 6.   Skin: Skin is warm and dry.         ED Course   Procedures  Labs Reviewed   CBC W/ AUTO DIFFERENTIAL - Abnormal; Notable for the following components:       Result Value    RBC 3.63 (*)     Hemoglobin 10.2 (*)     Hematocrit 32.4 (*)     Mean Corpuscular Hemoglobin Conc 31.5 (*)     RDW 17.3 (*)     Mono # 0.2 (*)     All other components within normal limits   COMPREHENSIVE METABOLIC PANEL - Abnormal; Notable for the following components:    Sodium 135 (*)     Potassium 3.3 (*)     Glucose 123 (*)     BUN, Bld 38 (*)     Creatinine 7.6 (*)     Calcium 7.7 (*)     Albumin 2.5 (*)     ALT 7 (*)     eGFR if  8 (*)     eGFR if non  7 (*)     All other components within normal limits   SARS-COV-2 (COVID-19) QUALITATIVE PCR     EKG Readings: (Independently Interpreted)   Initial Reading: No STEMI. Previous EKG: Compared with most recent EKG Previous EKG Date: 3/21/2020 (Minimal change). Rhythm: Normal Sinus Rhythm. Heart Rate: 79. Ectopy: No Ectopy. Conduction: Normal. ST Segments: Normal ST Segments. Other Findings: Prolonged QT Interval.     ECG Results          EKG 12-lead (Final result)  Result time 03/25/20 16:34:19    Final result by Interface, Lab In [facility] (03/25/20 16:34:19)                 Narrative:    Test Reason : R50.9,    Vent. Rate : 079 BPM     Atrial Rate : 079 BPM     P-R Int : 154 ms          QRS Dur : 100 ms      QT Int : 446 ms       P-R-T Axes : 069 -07 -62 degrees     QTc Int : 511 ms    Normal sinus rhythm  Septal infarct (cited on or before  19-FEB-2020)  Prolonged QT  Abnormal ECG  When compared with ECG of 21-MAR-2020 15:42,  No significant change was found  Confirmed by AIMEE GLEASON MD ([id number]) on 3/25/2020 4:34:09 PM    Referred By: KHARI   SELF           Confirmed By:AIMEE GLEASON MD                              X-Rays:   Independently Interpreted Readings:   Other Readings:  CXR Interpreted by radiologist and visualized by me:     Imaging Results          X-Ray Chest AP Portable (Final result)  Result time 03/25/20 14:47:31    Final result by Yimi Sung Jr., MD (03/25/20 14:47:31)                 Impression:      No convincing cardiopulmonary abnormality and no detrimental change.  Few increased markings in the left retrocardiac region likely similar.      Electronically signed by: Yimi Sung MD  Date:    03/25/2020  Time:    14:47             Narrative:    EXAMINATION:  XR CHEST AP PORTABLE    CLINICAL HISTORY:  Fever;    TECHNIQUE:  Single frontal view of the chest was performed.    COMPARISON:  March 21, 2020.    FINDINGS:  Monitoring leads and right subclavian vascular stent noted.  Heart size pulmonary vessels are similar.  No convincing consolidation allowing for the rotation.  No pneumothorax.  Bones are intact.                                Medical Decision Making:   Clinical Tests:   Lab Tests: Reviewed  ED Management:  Patient received dialysis in the ER. Handed off to Dr. De Los Santos for f/u of pending labs, reevaluation, and disposition.                    ED Course as of Mar 25 1824   Wed Mar 25, 2020   1453 Patient handed off to Dr. De Los Santos for f/u of pending labs and disposition    [BB]      ED Course User Index  [BB] Tylor Ramirez MD                Clinical Impression:       ICD-10-CM ICD-9-CM   1. ESRD on dialysis N18.6 585.6    Z99.2 V45.11   2. Fever R50.9 780.60   3. Anemia in chronic kidney disease, on chronic dialysis N18.6 285.21    D63.1 585.6    Z99.2 V45.11   4. History of Intracerebral Hemorrhage: L BG 5/2013;  R BG 9/2016; R BG 11/2016; L caudate head 2/2017 Z86.79 V12.59   5. Normocytic anemia D64.9 285.9             ED Disposition Condition    Discharge Stable        ED Prescriptions     None        Follow-up Information     Follow up With Specialties Details Why Contact Info    Croi Randall MD Hospitalist   [address] [place] [address]  [phone]      [facility]  If symptoms worsen [address] [facility] [id number] [phone]                                     Tylor Ramirez MD  03/30/20 9658

## 2020-03-28 PROBLEM — R50.9 FEVER: Status: ACTIVE | Noted: 2020-01-01

## 2020-03-28 PROBLEM — U07.1 COVID-19 VIRUS INFECTION: Status: ACTIVE | Noted: 2020-01-01

## 2020-03-28 NOTE — PLAN OF CARE
03/28/2020 @ 1245: called the number listed in pt's chart to give the MOON and pt's sister answered the phone and responded to my questions like she is not aware that pt is in the [facility], she stated that pt is at [facility], I did not make pt wawre that pt is at [facility] nor did not initiate MOON  information as to not share pt's medical information without his consent , MEGAN

## 2020-03-28 NOTE — PROGRESS NOTES
Attempted to contact patient to inform of positive COVID-19 testing, no answer. Will re-try again later.

## 2020-03-28 NOTE — H&P
Hospital Medicine  History and Physical  [facility]      Patient Name: Vaughn Retana  MRN:  [id number]  [facility] Medicine Team: [facility]OSP MED E Charissa Abraham PA-C  Date of Admission:  3/28/2020     Length of Stay:  LOS: 0 days     Principal Problem: Suspected Covid-19 Virus Infection    Chief complaint    Positive COVID-19 lab     HPI    54 y.o. male with significant past medical history of hypertension, intracranial bleed, end-stage renal on dialysis MWF,L BKA 2/2 osteomyelitis, history of DVT, history of heart failure including pulmonary hypertension with normal EF (last 2017), hx upper GI bleeds, PEG status, seizures, latent TB presented from [facility] due to positive COVID-19 test (3/25/2020). Pt has had a recent hx of fever, nausea, and vomiting. He was in the ED for these symptoms on 3/16/20 and 3/21/20, workup unremarkable. Pt was transferred back to ED from [facility] today when his COVID-19 test resulted as positive. He denied shortness of breath, chest pain, fevers, cough, belly pain, nausea, vomiting to ED provider.  Pt unable to go over ROS with admitting provider as he was very lethargic.    In the ED, labs notable for elevated ferritin and CRP.  CXR findings compatible with platelike atelectasis in the left upper and lower lobes.  Overall no significant interval change.  COVID-19 test positive obtained on 3/25/2020.     Review of Systems    Constitutional: Positive for fever, chills, fatigue, poor appetite   HENT: Positive for sore throat, negative for trouble swallowing.    Eyes: Negative for photophobia, visual disturbance.   Respiratory: Positive for cough, shortness of breath  Cardiovascular: Negative for chest pain, palpitations, leg swelling.   Gastrointestinal: Negative for diarrhea. Negative for abdominal pain, constipation, nausea, vomiting.   Endocrine: Negative for cold intolerance, heat intolerance.   Genitourinary: Negative for dysuria, frequency.    Musculoskeletal: Negative for arthralgias, myalgias.   Skin: Negative for rash  Neurological: Negative for dizziness, syncope, light-headedness.   Psychiatric/Behavioral: Negative for confusion, hallucinations, anxiety    Past Medical History:   Diagnosis Date    Amputation stump pain 9/10/2013    Aspiration pneumonia 7/27/2015    Asterixis 11/8/2016    C. difficile colitis 8/7/2015    Cholelithiasis without obstruction 8/25/2015    Chronic diastolic heart failure     2-23-17   1 - Low normal to mildly depressed left ventricular systolic function (EF 50-55%).    2 - Right ventricular enlargement with mildly depressed systolic function.    3 - Left ventricular diastolic dysfunction.    4 - Right atrial enlargement.    5 - Severe tricuspid regurgitation.    6 - Pulmonary hypertension. The estimated PA systolic pressure is 86 mmHg.    7 - Increased central venous pressure.    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    Metabolic acidosis, IAG, reduced excretion of inorganic acids     Myoclonic jerking 9/20/2016    Noncompliance with medication regimen 12/4/2018    Secondary hyperparathyroidism (of renal origin)     Secondary pulmonary hypertension 3/23/2017    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    TB lung, latent 08/25/2015    Negative Quantiferon Gold 3-23-17     Past Surgical History:   Procedure Laterality Date     COLONOSCOPY      COLONOSCOPY N/A 4/4/2017    Procedure: COLONOSCOPY;  Surgeon: Walker Stern MD;  Location: [facility] (2ND FLR);  Service: Endoscopy;  Laterality: N/A;  PA Systolic Pressure 85.56. HD Patient MWF, K+ lab prior to procedure.     ESOPHAGOGASTRODUODENOSCOPY N/A 6/12/2018    Procedure: EGD (ESOPHAGOGASTRODUODENOSCOPY);  Surgeon: Man Galicia MD;  Location: [facility] (2ND FLR);  Service: Endoscopy;  Laterality: N/A;  EGD in 8-12 weeks with Dr. Galicia on 4th floor for follow up erosive esophagitis and Mejia's surveillance.    Patient should be on Pantoprazole 40mg every 12 hours or the equivulant of another PPI    awaiting for patient to reply back regarding changing    ESOPHAGOGASTRODUODENOSCOPY N/A 3/7/2019    Procedure: EGD (ESOPHAGOGASTRODUODENOSCOPY);  Surgeon: Man Galicia MD;  [facility]  Service: Endoscopy;  Laterality: N/A;    ESOPHAGOGASTRODUODENOSCOPY N/A 9/23/2019    Procedure: EGD (ESOPHAGOGASTRODUODENOSCOPY);  Surgeon: Keanu Rainey MD;  Location: [facility] (2ND FLR);  Service: Endoscopy;  Laterality: N/A;    ESOPHAGOGASTRODUODENOSCOPY N/A 10/2/2019    Procedure: EGD (ESOPHAGOGASTRODUODENOSCOPY);  Surgeon: Kevin De La Paz MD;  [facility]  Service: Endoscopy;  Laterality: N/A;    ESOPHAGOGASTRODUODENOSCOPY N/A 11/12/2019    Procedure: EGD (ESOPHAGOGASTRODUODENOSCOPY);  Surgeon: Kevin De La Paz MD;  Location: [facility] (2ND FLR);  Service: Endoscopy;  Laterality: N/A;    ESOPHAGOGASTRODUODENOSCOPY N/A 2/14/2020    Procedure: EGD (ESOPHAGOGASTRODUODENOSCOPY);  Surgeon: Kevin De La Paz MD;  Location: [facility] (2ND FLR);  Service: Endoscopy;  Laterality: N/A;    FOOT AMPUTATION THROUGH METATARSAL      left foot    LEG AMPUTATION THROUGH KNEE  12/18/2013    left BKA    R AVF  9/12/12    UPPER GASTROINTESTINAL ENDOSCOPY       Family History   Problem Relation Age of Onset    Early death Mother     Kidney disease Father      Hypertension Father     Heart disease Father     Hyperlipidemia Father     Diabetes Brother     Alcohol abuse Maternal Grandmother     Diabetes Maternal Grandfather     Hypertension Sister     Melanoma Neg Hx      Social History     Socioeconomic History    Marital status:      Spouse name: Not on file    Number of children: Not on file    Years of education: Not on file    Highest education level: Not on file   Occupational History    Not on file   Social Needs    Financial resource strain: Not on file    Food insecurity:     Worry: Not on file     Inability: Not on file    Transportation needs:     Medical: Not on file     Non-medical: Not on file   Tobacco Use    Smoking status: Former Smoker     Packs/day: 1.00     Years: 10.00     Pack years: 10.00    Smokeless tobacco: Never Used   Substance and Sexual Activity    Alcohol use: No    Drug use: No    Sexual activity: Yes     Partners: Female     Birth control/protection: None   Lifestyle    Physical activity:     Days per week: Not on file     Minutes per session: Not on file    Stress: Not on file   Relationships    Social connections:     Talks on phone: Not on file     Gets together: Not on file     Attends L[employer]-day service: Not on file     Active member of club or organization: Not on file     Attends meetings of clubs or organizations: Not on file     Relationship status: Not on file   Other Topics Concern    Not on file   Social History Narrative    Not on file       Medications  No current facility-administered medications on file prior to encounter.      Current Outpatient Medications on File Prior to Encounter   Medication Sig Dispense Refill    acetaminophen (TYLENOL) 325 MG tablet Take 2 tablets (650 mg total) by mouth every 8 (eight) hours as needed.  0    doxycycline (DORYX) 100 MG EC tablet Take 100 mg by mouth 2 (two) times daily.      folic acid/vit B complex and C (RENAL VITAMIN ORAL) Take by mouth.       levETIRAcetam (KEPPRA) 100 mg/mL Soln Take 2.5 mLs (250 mg total) by mouth 2 (two) times daily. 150 mL 11    metoclopramide HCl (REGLAN) 10 MG tablet Take 10 mg by mouth 4 (four) times daily.      midodrine (PROAMATINE) 5 MG Tab Take 1 tablet (5 mg total) by mouth every Mon, Wed, Fri. Please take 30 min before dialysis on Monday Wednesday and Friday as needed 60 tablet 3    ondansetron (ZOFRAN) 8 MG tablet Take 1 tablet (8 mg total) by mouth every 12 (twelve) hours as needed for Nausea. 60 tablet 1    pantoprazole (PROTONIX) 40 MG tablet Take 1 tablet (40 mg total) by mouth 2 (two) times daily. 60 tablet 11    promethazine (PHENERGAN) 25 MG tablet Take 1 tablet (25 mg total) by mouth every 6 (six) hours as needed for Nausea. 15 tablet 0    senna (SENOKOT) 8.6 mg tablet Take 2 tablets by mouth once daily.       sevelamer carbonate (RENVELA) 800 mg Tab Take 1 tablet (800 mg total) by mouth 3 (three) times daily with meals. 90 tablet 3    [DISCONTINUED] promethazine (PHENERGAN) 25 MG tablet Take 1 tablet (25 mg total) by mouth every 6 (six) hours as needed for Nausea. 15 tablet 0    hydrALAZINE (APRESOLINE) 50 MG tablet Take 0.5 tablets (25 mg total) by mouth every 8 (eight) hours as needed (for SBP > 170). 45 tablet 11    RENAPLEX-D 800 mcg-12.5 mg -2,000 unit Tab Take 1 tablet by mouth once daily.  3       Allergies  Fosrenol [lanthanum]    Physical Examination  Temp:  [99 °F (37.2 °C)]   Pulse:  []   Resp:  [18-20]   BP: (118-124)/(55-81)   SpO2:  [97 %-98 %]     Gen: NAD, lethargic, pt not conversant   Head: NC, AT  Eyes: EOMI  Throat: MMM, OP clear  CV: RRR, no M/R/G, no peripheral edema, no JVD  Resp: no increased work of breathing on room air   GI: Soft, NT, ND, +BS  Ext: MAEW, no c/c/e  Neuro: Oriented to person, no focal neurologic deficits.    Psychiatry: lethargic    Laboratory:  Recent Labs   Lab 03/25/20  1401 03/28/20  1226   WBC 4.39 5.53   LYMPH 22.6  1.0 21.3  1.2   HGB 10.2* 10.1*    HCT 32.4* 32.1*    223     Recent Labs   Lab 03/25/20  1401 03/28/20  1226   * 138   K 3.3* 4.1   CL 96 96   CO2 25 26   BUN 38* 44*   CREATININE 7.6* 6.7*   * 91   CALCIUM 7.7* 7.9*     Recent Labs   Lab 03/25/20  1401 03/28/20  1226   ALKPHOS 103 95   ALT 7* 7*   AST 28 37   ALBUMIN 2.5* 2.4*   PROT 7.6 7.9   BILITOT 0.3 0.4      Recent Labs     03/28/20  1226 03/28/20  1344   DDIMER  --  0.98*   FERRITIN 1,762*  --    .1*  --    BNP 1,089*  --    TROPONINI 1.344*  --        All labs within the last 24 hours were reviewed.     Microbiology:  Lab Results   Component Value Date    [id number] Detected (A) 03/25/2020       Microbiology Results (last 7 days)     Procedure Component Value Units Date/Time    Blood culture [[id number]] Collected:  03/28/20 1407    Order Status:  Sent Specimen:  Blood from Peripheral, Forearm, Left Updated:  03/28/20 1414    Blood culture [[id number]] Collected:  03/28/20 1345    Order Status:  Sent Specimen:  Blood from Peripheral, Antecubital, Left Updated:  03/28/20 1359          Imaging    No results found for this or any previous visit.    X-Ray Chest AP Portable  Narrative: EXAMINATION:  XR CHEST AP PORTABLE    CLINICAL HISTORY:  Fever, COVID+;    TECHNIQUE:  Single frontal view of the chest was performed.    COMPARISON:  03/25/2020 and 03/21/2020    FINDINGS:  The patient is rotated on this exam limiting evaluation of the left lung.  Platelike airspace opacities are identified in the left upper and left lower lobes.  No focal airspace consolidation.  No pneumothorax or pleural effusion.  The cardiomediastinal silhouette is normal.    Incidental note is made of a right subclavian vascular stent.  The osseous and soft tissue structures are normal.  Impression: Findings compatible with platelike atelectasis in the left upper and lower lobes.  Overall no significant interval change.    Electronically signed by: Kassy Oro  MD  Date:    03/28/2020  Time:    12:38    Assessment and Plan:    Active Hospital Problems    Diagnosis  POA    *COVID-19 virus infection [J22, B97.29]  Yes     Priority: 1 - High    Fever [R50.9]  Yes    ESRD on dialysis [N18.6, Z99.2]  Not Applicable    Seizure [R56.9]  Yes    Chronic blood loss anemia [D50.0]  Yes    Personal history of latent tuberculosis infection [Z86.15]  Yes    Severe malnutrition [E43]  Yes     Assessment and Plan  Severe Malnutrition in the context of Social/Environmental Circumstances     Related to (etiology):  Unknown etiology     Signs and Symptoms (as evidenced by):  Energy Intake: per pt, <75% intake over the last year  Body Fat Depletion: overall subcutaneous fat loss, moderate triceps fat loss and protruding patellas.  Muscle Mass Depletion:  moderate muscle wasting of interosseous, temporal, clavicle, calves and quadriceps  Weight Loss: 24% (39 lbs) x 1 year    Recommendations     Recommendation/Intervention: 1. Should pt be cleared for po diet, recommend renal diet with texture per SLP along with Novasource ONS TID. 2. Should pt require enteral feeding, initiate Novasource renal formula at 10ml/hr and advance 10ml Q4hrs to goal rate of 40ml/hr (provides 1920kcal, 87g pro, 691ml free water). Provide additional 500ml water flush/24 hrs. Hold for residuals >500ml.  Goals: 1. Pt to receive nutrition < 48 hrs.      History of GI bleed [Z87.19]  Not Applicable    Renovascular hypertension [I15.0]  Yes     Chronic    Pulmonary hypertension associated with end-stage renal disease (ESRD) on dialysis [I27.29, N18.6, Z99.2]  Not Applicable     Chronic    Chronic diastolic heart failure [I50.32]  Yes     Chronic     2-23-17    1 - Low normal to mildly depressed left ventricular systolic function (EF 50-55%).     2 - Right ventricular enlargement with mildly depressed systolic function.     3 - Left ventricular diastolic dysfunction.     4 - Right atrial enlargement.     5 -  Severe tricuspid regurgitation.     6 - Pulmonary hypertension. The estimated PA systolic pressure is 86 mmHg.     7 - Increased central venous pressure.         Resolved Hospital Problems   No resolved problems to display.       Suspected Covid-19 Virus Infection  Person Under Investigation (PUI) for COVID-19  - COVID-19 testing: Collection Date: 3/25/2020 Collection Time:   2:10 PM POSITIVE  - Infection Control notified    - Isolation:   - Airborne and Droplet Precautions  - Surgical mask on patient   - N95 masks must be fit tested, wear eye protection  - 20 second hand hygiene   - Limit visitors per hospital policy   - Consolidate lab draws, nursing care, and interventions    - Diagnostics: (Lymphopenia, hyponatremia, hyperferritinemia, elevated troponin, elevated d-dimer, age, and comorbidities are significant predictors of poor clinical outcome)   - CBC: Hgb 10.1  trend Q48hrs  - CMP: BUN 44, Cr 6.7    trend Q48hrs  - Procalcitonin: 2.24  - D-dimer: 0.98   repeat prior to discharge  - Ferritin: 1762   repeat prior to discharge   - CRP: 185.1        trend Q48hrs  - LDH: ordered  repeat prior to discharge  - BNP: ordered  - Troponin: 1.344    - ECG: ordered   - rapid Flu: negative on 3/21/20   - RIP only if BMT/solid transplant:   - Legionella antigen: ordered   - Blood culture x2: ordered   - Sputum culture: ordered    - CXR: platelike atelectasis in the left upper and lower lobes   - UA and culture: ordered   - CPK: 202    - Management:   Bundle care as able to maintain isolation & minimize in/out of room   - Supplemental O2 to maintain SpO2 92%-96%   if requiring 6L NC or higher, place on nonrebreather and discuss case with MICU   - Telemetry & continuous pulse oximetry    - If wheezing   - albuterol inhaler 2-4 puff Q6hr PRN    - ipratropium daily    - acetaminophen 650mg PO Q6hr PRN fever   - loperamide PRN for viral diarrhea  - Empiric antibiotics per likely source & patient allergies    - CAP: x 5 day  course (d/c early if low concern for bacterial co-infection)  Ceftriaxone 1g IV Q24hrs            Pt started on doxycycline on 3/23/2020, will continue                 If MRSA risk factors, add Vancomycin IV (PharmD consult)   - Investigational Treatment Protocol: (if patient meets criteria)   [email or web address]     - statin: atorvastatin 40mg po daily (if CPK WNL) ordered    - start HCQ 400mg PO BID x1 day, then 400mg PO daily x 4 days   (check glucose 6 phosphate dehydrogenase (NOT G6PD Quant), ECG, and start Qshift POCT glucose)  Currently not meeting criteria    Safety notes:   - Avoid NIPPV (CPAP/BiPAP) to prevent aerosolization, use on a case-by-case basis if in neg pressure room   - Cautious use of NSAIDS for fever per WHO recommendations (3/16/2020)   - No new ACEi/ARB start or discontinuation of chronic med unless hypotensive (Esler et al. Journal of Hypertension 2020, 38:000-000)   - Careful use of steroids in the absence of other indications (shock, ARDS)   - Fluid sparing resuscitation, avoid maintenance fluids    Elevated Troponin  Trop 1.3, trend   EKG no acute ischemic changes  Patient not complaining of CP  Suspect elevated in setting of COVID19 infection, ESRD    Renovascular Hypertension   Pulmonary Hypertension with ESRD on Dialysis   Continue regular dialysis MWF  Continue home hydralazine prn SBP >170  BP controlled on admission     History of GI bleed   Chronic blood loss anemia   Hgb 10.1 at baseline  Continue home Protonix    Seizures  Continue home Keppra     Severe malnutrition   Nursing home confirmed patient eats orally and receives supplemental Novasource through PEG   Will continue Novasource 50 cc/hr x 10 hrs      Advance Care Planning  Goals of care, counseling/discussion     Advance Care Planning     Code Status  In light of the patients advanced and life limiting illness,I engaged the  the patient's sister, Alicia Retana, in a conversation about the patient's preferences for care  at the very end of life. The patient wishes to have a natural, peaceful death.  Along those lines, the patient does not wish to have CPR or other invasive treatments performed when his heart and/or breathing stops. I communicated to Alicia Retana that a DNR form was completed and will be scanned into EPIC.  I spent a total of 20 minutes engaging the patient in this advance care planning discussion.     VTE High Risk Prophylaxis: enoxaparin 40mg sq QHS @ 2100 (bundled care) if GFR >30    Patient's chronic/stable medical conditions noted in the assessment above will be managed with the patient's home medications as tolerated.     Initial Hospital Care     Level 3 [id number] Total visit time was 70 minutes or greater with greater than 50% of time spent in counseling and coordination of care.     PRATIK Weir

## 2020-03-28 NOTE — ED PROVIDER NOTES
Encounter Date: 3/28/2020       History     Chief Complaint   Patient presents with    Fever     Pt is COVID +; pt is not having worsening symptoms; Maximiliano Nani sent him to the ED because he is positive.      HPI   54-year-old male with past medical history of hypertension, intracranial bleed, end-stage renal on dialysis, history of DVT, history of heart failure including pulmonary hypertension, latent TB, nursing home resident at [facility], being sent in for a fever and positive COVID test.    Patient was seen in the emergency department on 3/25 for fever, workup was unrevealing and he was discharged back to the nursing home.    His test for COVID resulted positive and so he was sent back in.    Patient denies any changes symptoms.  In fact, at the moment he denies any symptoms.  He denies shortness of breath, chest pain, fevers, cough, belly pain, nausea, vomiting or any other symptoms at this time.    Review of patient's allergies indicates:   Allergen Reactions    Fosrenol [lanthanum] Nausea And Vomiting     Nausea and vomiting     Past Medical History:   Diagnosis Date    Amputation stump pain 9/10/2013    Aspiration pneumonia 7/27/2015    Asterixis 11/8/2016    C. difficile colitis 8/7/2015    Cholelithiasis without obstruction 8/25/2015    Chronic diastolic heart failure     2-23-17   1 - Low normal to mildly depressed left ventricular systolic function (EF 50-55%).    2 - Right ventricular enlargement with mildly depressed systolic function.    3 - Left ventricular diastolic dysfunction.    4 - Right atrial enlargement.    5 - Severe tricuspid regurgitation.    6 - Pulmonary hypertension. The estimated PA systolic pressure is 86 mmHg.    7 - Increased central venous pressure.     Chronic low back pain 12/1/2015    Closed head injury 9/8/2016    DVT (deep venous thrombosis) 7/28/2017    ESRD on hemodialysis 2/7/2013    MWF at [facility]    GERD (gastroesophageal reflux disease)     HCV  antibody positive     Normal LFT as of 3/2017    Hemiparesis affecting left side as late effect of stroke 11/08/2016    History of Intracerebral Hemorrhage: L BG 5/2013; R BG 9/2016; R BG 11/2016; L caudate head 2/2017 11/2/2016    Hypertension     left basal ganglia ICH 5/2013 11/2/2016    Left Caudate Head ICH 2/22/2017 2/24/2017    Malignant hypertension with heart failure and ESRD 8/1/2015    Metabolic acidosis, IAG, reduced excretion of inorganic acids     Myoclonic jerking 9/20/2016    Noncompliance with medication regimen 12/4/2018    Secondary hyperparathyroidism (of renal origin)     Secondary pulmonary hypertension 3/23/2017    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    TB lung, latent 08/25/2015    Negative Quantiferon Gold 3-23-17     Past Surgical History:   Procedure Laterality Date    COLONOSCOPY      COLONOSCOPY N/A 4/4/2017    Procedure: COLONOSCOPY;  Surgeon: Walker Stern MD;  Location: [facility] ([address] [place] [address]);  Service: Endoscopy;  Laterality: N/A;  PA Systolic Pressure 85.56. HD Patient MWF, K+ lab prior to procedure.     ESOPHAGOGASTRODUODENOSCOPY N/A 6/12/2018    Procedure: EGD (ESOPHAGOGASTRODUODENOSCOPY);  Surgeon: Man Galicia MD;  Location: [facility] ([address] [place] [address]);  Service: Endoscopy;  Laterality: N/A;  EGD in 8-12 weeks with Dr. Galicia on 4th floor for follow up erosive esophagitis and Mejia's surveillance.    Patient should be on Pantoprazole 40mg every 12 hours or the equivulant of another PPI    awaiting for patient to reply back regarding changing    ESOPHAGOGASTRODUODENOSCOPY N/A 3/7/2019    Procedure: EGD (ESOPHAGOGASTRODUODENOSCOPY);  Surgeon: Man Galicia MD;  [facility]  Service: Endoscopy;  Laterality: N/A;    ESOPHAGOGASTRODUODENOSCOPY N/A 9/23/2019    Procedure: EGD (ESOPHAGOGASTRODUODENOSCOPY);  Surgeon: Keanu Rainey MD;  [facility]  Service: Endoscopy;  Laterality: N/A;    ESOPHAGOGASTRODUODENOSCOPY  N/A 10/2/2019    Procedure: EGD (ESOPHAGOGASTRODUODENOSCOPY);  Surgeon: Kevin De La Paz MD;  Location: [facility] ENDO (2ND FLR);  Service: Endoscopy;  Laterality: N/A;    ESOPHAGOGASTRODUODENOSCOPY N/A 11/12/2019    Procedure: EGD (ESOPHAGOGASTRODUODENOSCOPY);  Surgeon: Kevin De La Paz MD;  Location: [facility] ENDO (2ND FLR);  Service: Endoscopy;  Laterality: N/A;    ESOPHAGOGASTRODUODENOSCOPY N/A 2/14/2020    Procedure: EGD (ESOPHAGOGASTRODUODENOSCOPY);  Surgeon: Kevin De La Paz MD;  Location: [facility] ENDO (2ND FLR);  Service: Endoscopy;  Laterality: N/A;    FOOT AMPUTATION THROUGH METATARSAL      left foot    LEG AMPUTATION THROUGH KNEE  12/18/2013    left BKA    R AVF  9/12/12    UPPER GASTROINTESTINAL ENDOSCOPY       Family History   Problem Relation Age of Onset    Early death Mother     Kidney disease Father     Hypertension Father     Heart disease Father     Hyperlipidemia Father     Diabetes Brother     Alcohol abuse Maternal Grandmother     Diabetes Maternal Grandfather     Hypertension Sister     Melanoma Neg Hx      Social History     Tobacco Use    Smoking status: Former Smoker     Packs/day: 1.00     Years: 10.00     Pack years: 10.00    Smokeless tobacco: Never Used   Substance Use Topics    Alcohol use: No    Drug use: No     Review of Systems   Constitutional:  No Fever  Eyes: No Vision Changes  ENT/Mouth: No sore throat, No rhinorrhea  Cardiovascular:  No Chest Pain  Respiratory:  No Cough, No SOB  Gastrointestinal:  No Nausea, No Vomiting, No Diarrhea, No abdo pain.  Musculoskeletal:  No Arthralgias, No Back Pain, No Neck Pain  Skin:  No skin Lesions  Neuro:  No Weakness, No Dizziness, No Headache        Physical Exam     Initial Vitals   BP Pulse Resp Temp SpO2   03/28/20 1122 03/28/20 1122 03/28/20 1122 03/28/20 1127 03/28/20 1122   124/81 102 18 99 °F (37.2 °C) 97 %      MAP       --                Physical Exam   Physical Exam:  CONSTITUTIONAL: Well developed, well nourished, in  no acute distress.  HENT: Normocephalic, atraumatic    EYES: Sclerae anicteric   NECK: Supple, no thyroid enlargement  RESPIRATORY: Speaking in full sentences. Breathing comfortably.   ABDOMEN: Soft and nontender, no masses, no rebound or guarding   NEUROLOGIC: Alert, interacting normally. No facial droop.   MSK:  Left below-the-knee amputation, Moving all four extremities.  Skin: Warm and dry. No visible rash on exposed areas of skin.    Psych: Mood and affect normal.       ED Course   Procedures  Labs Reviewed - No data to display       Imaging Results    None          Medical Decision Making:   History:   Old Medical Records: I decided to obtain old medical records.    54-year-old male with past history as noted being sent in from nursing home for positive COVID test.  There is a report of fever at the nursing home however in the emergency department he is afebrile at this time, patient has no complaints.  Patient is tired appearing but otherwise well-appearing in no acute distress with no respiratory findings.    Will start workup with basic labs, chest x-ray.    Given recent state mandate patient will not be able to be discharged back to nursing home.    Patient will need to be admitted to the hospital for continued monitoring and placement.    Update:  Labs and imaging still pending.  Discussed with Dr. Garcia of [facility] Medicine.  He will be obsess for monitoring pending placement.    Labs and imaging will be followed up by Medicine team.    MDM Complexity Points:   Problem Points:  1.New problem, with additional ED work-up planned - fever, COVID infection    Data Points:  Review or order clinical lab tests, Review or order radiology test, Decision to obtain old records (in the EHR), Review and summarization of old records and Discuss test with performing physician/consulting physician - discussed with hospital medicine.                                     Clinical Impression:       ICD-10-CM ICD-9-CM   1.  Fever, unspecified fever cause R50.9 780.60   2. COVID-19 virus infection J22     B97.29                                 Michael Potter MD  03/28/20 1232

## 2020-03-28 NOTE — PLAN OF CARE
03/28/2020 @ 1245: called the number listed in pt's chart to give the MOON and pt's sister answered the phone and responded to my questions like she is not aware that pt is in the [facility], she stated that pt is at [facility], I did not make sister aware nor did I  tinitiate CRUZ  information as to not share pt's medical information without his consent , MEGAN

## 2020-03-28 NOTE — ED NOTES
LOC Pt not responding    APPEARANCE: Patient resting comfortably in no acute distress.  Patient is clean and well groomed.    SKIN: The skin is warm and dry; color consistent with ethnicity.  Patient has normal skin turgor and moist mucus membranes.  Skin intact; no breakdown or bruising noted. Dialysis port to the right upper arm    MUSCULOSKELETAL: Patient moving upper and lower extremities without difficulty. Left side weakness. Left below the knee amputation     RESPIRATORY: Airway is open and patent. Respirations spontaneous, even, easy, and non-labored.  Patient has a normal effort and rate.  No accessory muscle use noted. Denies cough.     CARDIAC:  Normal rhythm and rate noted.  No peripheral edema noted. No complaints of chest pain.      ABDOMEN: Soft and non tender to palpation.  No distention noted.  G tube to the left lower abdomen    NEUROLOGIC: Eyes open spontaneously.  facial expression symmetrical.  Purposeful motor response noted; normal sensation in all extremities.

## 2020-03-29 NOTE — NURSING
Pt peg tube feed stopped at 10 am after 10 hours -- goal rate of 50 achieved. Residual 60cc. Pt has flat affect and does not respond to staff with the occasional head nod or shake.  Pt did answer the phone verbally and is appropriate.  Isolation for covid maintained.  Pt on room air with oxygen sats in mid to high 90s.  Pt states no needs. afebrile

## 2020-03-29 NOTE — PLAN OF CARE
Pt resting in bed in NAD. Answers some questions. Tube feeding initiated and dario by patient. No safety issues this shift

## 2020-03-29 NOTE — SUBJECTIVE & OBJECTIVE
Past Medical History:   Diagnosis Date    Amputation stump pain 9/10/2013    Aspiration pneumonia 7/27/2015    Asterixis 11/8/2016    C. difficile colitis 8/7/2015    Cholelithiasis without obstruction 8/25/2015    Chronic diastolic heart failure     2-23-17   1 - Low normal to mildly depressed left ventricular systolic function (EF 50-55%).    2 - Right ventricular enlargement with mildly depressed systolic function.    3 - Left ventricular diastolic dysfunction.    4 - Right atrial enlargement.    5 - Severe tricuspid regurgitation.    6 - Pulmonary hypertension. The estimated PA systolic pressure is 86 mmHg.    7 - Increased central venous pressure.     Chronic low back pain 12/1/2015    Closed head injury 9/8/2016    DVT (deep venous thrombosis) 7/28/2017    ESRD on hemodialysis 2/7/2013    MWF at [facility]   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    Metabolic acidosis, IAG, reduced excretion of inorganic acids     Myoclonic jerking 9/20/2016    Noncompliance with medication regimen 12/4/2018    Secondary hyperparathyroidism (of renal origin)     Secondary pulmonary hypertension 3/23/2017    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    TB lung, latent 08/25/2015    Negative Quantiferon Gold 3-23-17       Past Surgical History:   Procedure Laterality Date    COLONOSCOPY      COLONOSCOPY N/A 4/4/2017    Procedure: COLONOSCOPY;  Surgeon: Walker Stern MD;  Location: [facility] ([address] [place] [address]);  Service: Endoscopy;  Laterality: N/A;  PA Systolic Pressure 85.56. HD Patient MWF, K+ lab  prior to procedure.     ESOPHAGOGASTRODUODENOSCOPY N/A 6/12/2018    Procedure: EGD (ESOPHAGOGASTRODUODENOSCOPY);  Surgeon: Man Galicia MD;  Location: [facility] (2ND FLR);  Service: Endoscopy;  Laterality: N/A;  EGD in 8-12 weeks with Dr. Galicia on 4th floor for follow up erosive esophagitis and Mjeia's surveillance.    Patient should be on Pantoprazole 40mg every 12 hours or the equivulant of another PPI    awaiting for patient to reply back regarding changing    ESOPHAGOGASTRODUODENOSCOPY N/A 3/7/2019    Procedure: EGD (ESOPHAGOGASTRODUODENOSCOPY);  Surgeon: Man Galicia MD;  [facility]  Service: Endoscopy;  Laterality: N/A;    ESOPHAGOGASTRODUODENOSCOPY N/A 9/23/2019    Procedure: EGD (ESOPHAGOGASTRODUODENOSCOPY);  Surgeon: Keanu Rainey MD;  [facility]  Service: Endoscopy;  Laterality: N/A;    ESOPHAGOGASTRODUODENOSCOPY N/A 10/2/2019    Procedure: EGD (ESOPHAGOGASTRODUODENOSCOPY);  Surgeon: Kevin De La Paz MD;  [facility]  Service: Endoscopy;  Laterality: N/A;    ESOPHAGOGASTRODUODENOSCOPY N/A 11/12/2019    Procedure: EGD (ESOPHAGOGASTRODUODENOSCOPY);  Surgeon: Kevin De La Paz MD;  [facility]  Service: Endoscopy;  Laterality: N/A;    ESOPHAGOGASTRODUODENOSCOPY N/A 2/14/2020    Procedure: EGD (ESOPHAGOGASTRODUODENOSCOPY);  Surgeon: Kevin De La Paz MD;  [facility]  Service: Endoscopy;  Laterality: N/A;    FOOT AMPUTATION THROUGH METATARSAL      left foot    LEG AMPUTATION THROUGH KNEE  12/18/2013    left BKA    R AVF  9/12/12    UPPER GASTROINTESTINAL ENDOSCOPY         Review of patient's allergies indicates:   Allergen Reactions    Fosrenol [lanthanum] Nausea And Vomiting     Nausea and vomiting     Current Facility-Administered Medications   Medication Frequency    acetaminophen tablet 650 mg Q4H PRN    albuterol inhaler 2 puff Q6H WAKE    atorvastatin tablet 40 mg QHS     cefTRIAXone injection 1 g Q24H    dextromethorphan-guaifenesin  mg/5 ml liquid 10 mL Q4H PRN    dextrose 10% (D10W) Bolus PRN    dextrose 10% (D10W) Bolus PRN    doxycycline tablet 100 mg Q12H    glucagon (human recombinant) injection 1 mg PRN    glucose chewable tablet 16 g PRN    glucose chewable tablet 24 g PRN    hydrALAZINE tablet 25 mg Q8H PRN    levetiracetam oral soln Soln 250 mg BID    melatonin tablet 6 mg Nightly PRN    metoclopramide HCl tablet 10 mg TID AC    [START ON 3/30/2020] midodrine tablet 5 mg Every Mon, Wed, Fri    ondansetron disintegrating tablet 8 mg Q8H PRN    pantoprazole injection 40 mg Daily    promethazine (PHENERGAN) 6.25 mg in dextrose 5 % 50 mL IVPB Q6H PRN    senna tablet 2 tablet Daily    sevelamer carbonate tablet 800 mg TID WM    sodium chloride 0.9% flush 10 mL PRN     Family History     Problem Relation (Age of Onset)    Alcohol abuse Maternal Grandmother    Diabetes Brother, Maternal Grandfather    Early death Mother    Heart disease Father    Hyperlipidemia Father    Hypertension Father, Sister    Kidney disease Father        Tobacco Use    Smoking status: Former Smoker     Packs/day: 1.00     Years: 10.00     Pack years: 10.00    Smokeless tobacco: Never Used   Substance and Sexual Activity    Alcohol use: No    Drug use: No    Sexual activity: Yes     Partners: Female     Birth control/protection: None     Review of Systems   Constitutional: Positive for fatigue and fever.   HENT: Negative.         Denies ENT concerns.   Eyes: Negative for visual disturbance.   Respiratory: Positive for shortness of breath.    Cardiovascular: Negative for chest pain and palpitations.   Gastrointestinal: Negative for abdominal pain.   Genitourinary: Negative for difficulty urinating and hematuria.   Musculoskeletal: Negative for back pain.   Skin: Negative for rash.   Neurological: Negative for weakness.   Hematological: Does not bruise/bleed easily.    Psychiatric/Behavioral: Negative for sleep disturbance.     Objective:     Vital Signs (Most Recent):  Temp: 98.9 °F (37.2 °C) (03/29/20 0917)  Pulse: 106 (03/29/20 1100)  Resp: 18 (03/29/20 0004)  BP: 110/64 (03/29/20 0917)  SpO2: 97 % (03/29/20 1100)  O2 Device (Oxygen Therapy): room air (03/28/20 2314) Vital Signs (24h Range):  Temp:  [97.2 °F (36.2 °C)-99.2 °F (37.3 °C)] 98.9 °F (37.2 °C)  Pulse:  [] 106  Resp:  [16-19] 18  SpO2:  [95 %-98 %] 97 %  BP: (106-118)/(55-72) 110/64     Weight: 54 kg (119 lb 0.8 oz) (03/28/20 2200)  Body mass index is 19.21 kg/m².  Body surface area is 1.59 meters squared.    I/O last 3 completed shifts:  In: 240 [P.O.:180; NG/GT:60]  Out: -     Physical Exam unable to perform d/t COVID-19 status  Gen: NAD, lethargic, pt not conversant   Head: NC, AT  Eyes: EOMI  Throat: MMM, OP clear  CV: RRR, no M/R/G, no peripheral edema, no JVD  Resp: no increased work of breathing on room air   GI: Soft, NT, ND, +BS  Ext: MAEW, no c/c/e  Neuro: Oriented to person, no focal neurologic deficits.    Psychiatry: lethargic    Significant Labs:  Recent Labs   Lab 03/25/20  1401 03/28/20  1226 03/28/20  2045   * 138 136   K 3.3* 4.1  --    CL 96 96  --    CO2 25 26  --    BUN 38* 44*  --    CREATININE 7.6* 6.7*  --    CALCIUM 7.7* 7.9*  --

## 2020-03-29 NOTE — NURSING
Tube feed novosource renal started at 10 ml/hr. Will check residual and increase by ten to goal of 50 as tolerated

## 2020-03-29 NOTE — PROGRESS NOTES
"Hospital Medicine  Progress Note  [facility]      Patient Name: Vaughn Retana  MRN:  [id number]  Hospital Medicine Team: [facility] Rekha Denson PA-C  Date of Admission:  3/28/2020     Length of Stay:  LOS: 0 days       Principal Problem:  COVID-19 virus infection      Hospital Course:  Patient admitted from NH for of COVID-19.     Interval History:     No reported events overnight. Troponin elevated above baseline on admit, questionable heart failure exacerbation component? BNP higher than baseline. Patient denies chest pain. Repeat stat trop from this AM pending. Pt seen at bedside this AM resting in NAD, he is awake but not very conversant, I feel more from lack of participation rather than capacity. Nursing had similar encounter and reported hearing him talking on his room phone as she was leaving. Discussed plan of care, pt voiced no complaints.     Review of Systems:  Patient shakes head "no" when asked about shortness of breath, chest pain, or cough.     Inpatient Medications:    Current Facility-Administered Medications:     acetaminophen tablet 650 mg, 650 mg, Oral, Q4H PRN, Charissa Abraham PA-C    albuterol inhaler 2 puff, 2 puff, Inhalation, Q6H WAKE, 2 puff at 03/29/20 1430 **AND** MDI Q6H WAKE, , , Q6H WAKE, Rekha Denson PA-C    atorvastatin tablet 40 mg, 40 mg, Per G Tube, QHS, Aleja Donovan PA-C, 40 mg at 03/28/20 2101    cefTRIAXone injection 1 g, 1 g, Intravenous, Q24H, Charissa Abraham PA-C, 1 g at 03/29/20 0913    dextromethorphan-guaifenesin  mg/5 ml liquid 10 mL, 10 mL, Oral, Q4H PRN, Charissa Abraham PA-C    dextrose 10% (D10W) Bolus, 12.5 g, Intravenous, PRN, Michael Potter MD    dextrose 10% (D10W) Bolus, 25 g, Intravenous, PRN, Michael Potter MD    doxycycline tablet 100 mg, 100 mg, Per G Tube, Q12H, Aleja Donovan PA-C, 100 mg at 03/29/20 0913    glucagon (human recombinant) injection 1 mg, 1 mg, Intramuscular, PRN, Charissa Abraham, " "[place]    glucose chewable tablet 16 g, 16 g, Oral, PRN, Charissa Abraham PA-C    glucose chewable tablet 24 g, 24 g, Oral, PRN, Charissa Abraham PA-C    hydrALAZINE tablet 25 mg, 25 mg, Oral, Q8H PRN, Charissa Abraham PA-C    levetiracetam oral soln Soln 250 mg, 250 mg, Per G Tube, BID, Aleja Donovan PA-C, 250 mg at 03/29/20 0900    melatonin tablet 6 mg, 6 mg, Oral, Nightly PRN, Charissa Abraham PA-C    metoclopramide HCl tablet 10 mg, 10 mg, Per G Tube, TID AC, Aleja Donovan PA-C, 10 mg at 03/29/20 0925    [START ON 3/30/2020] midodrine tablet 5 mg, 5 mg, Per G Tube, Every Mon, Wed, Fri, Aleja Donovan PA-C    ondansetron disintegrating tablet 8 mg, 8 mg, Oral, Q8H PRN, Charissa Abraham PA-C    pantoprazole injection 40 mg, 40 mg, Intravenous, Daily, Aleja Donovan PA-C, 40 mg at 03/28/20 2101    promethazine (PHENERGAN) 6.25 mg in dextrose 5 % 50 mL IVPB, 6.25 mg, Intravenous, Q6H PRN, Charissa Abraham PA-C    senna tablet 2 tablet, 2 tablet, Per G Tube, Daily, Aleja Donovan PA-C, 2 tablet at 03/29/20 0913    sevelamer carbonate tablet 800 mg, 800 mg, Per G Tube, TID WM, Aleja Donovan PA-C, 800 mg at 03/29/20 0913    sodium chloride 0.9% flush 10 mL, 10 mL, Intravenous, PRN, Michael Potter MD      Physical Exam:      Intake/Output Summary (Last 24 hours) at 3/29/2020 145  Last data filed at 3/29/2020 0420  Gross per 24 hour   Intake 240 ml   Output --   Net 240 ml     Wt Readings from Last 3 Encounters:   03/28/20 54 kg (119 lb 0.8 oz)   03/25/20 54.4 kg (120 lb)   03/21/20 54.4 kg (120 lb)       /64   Pulse 88   Temp 98.9 °F (37.2 °C)   Resp 16   Ht 5' 6" (1.676 m)   Wt 54 kg (119 lb 0.8 oz)   SpO2 95%   BMI 19.21 kg/m²     GEN: NAD, not conversant  Resp: normal work of breathing   CV: no edema  MSK: L BKA  Neuro: awake, alert, will nod yes and no to some questions; not participatory in exam or questioning    Laboratory:  Lab Results   Component Value Date    [id number] Detected (A) " 03/25/2020       Recent Labs   Lab 03/25/20  1401 03/28/20  1226 03/28/20  2045   WBC 4.39 5.53  --    LYMPH 22.6  1.0 21.3  1.2  --    HGB 10.2* 10.1* 9.5*   HCT 32.4* 32.1* 32.0*    223  --      Recent Labs   Lab 03/25/20  1401 03/28/20  1226 03/28/20  2045   * 138 136   K 3.3* 4.1  --    CL 96 96  --    CO2 25 26  --    BUN 38* 44*  --    CREATININE 7.6* 6.7*  --    * 91  --    CALCIUM 7.7* 7.9*  --      Recent Labs   Lab 03/25/20  1401 03/28/20  1226   ALKPHOS 103 95   ALT 7* 7*   AST 28 37   ALBUMIN 2.5* 2.4*   PROT 7.6 7.9   BILITOT 0.3 0.4        Recent Labs     03/28/20  1226 03/28/20  1344 03/28/20  1848   DDIMER  --  0.98*  --    FERRITIN 1,762*  --   --    .1*  --   --    LDH  --   --  325*   BNP 1,089*  --   --    TROPONINI 1.344*  --   --    CPK  --  202*  --        All labs within the last 24 hours were reviewed.     Microbiology:  Microbiology Results (last 7 days)     Procedure Component Value Units Date/Time    Blood culture [[id number]] Collected:  03/28/20 1407    Order Status:  Sent Specimen:  Blood from Peripheral, Forearm, Left Updated:  03/28/20 1414    Blood culture [[id number]] Collected:  03/28/20 1345    Order Status:  Sent Specimen:  Blood from Peripheral, Antecubital, Left Updated:  03/28/20 1359            Imaging  ECG Results          EKG 12-lead (In process)  Result time 03/28/20 21:26:07    In process by Interface, Lab In [facility] (03/28/20 21:26:07)                 Narrative:    Test Reason : R06.02,    Vent. Rate : 103 BPM     Atrial Rate : 103 BPM     P-R Int : 146 ms          QRS Dur : 098 ms      QT Int : 382 ms       P-R-T Axes : 089 -49 066 degrees     QTc Int : 500 ms    Sinus tachycardia  Right ventricular conduction delay  Left anterior fascicular block  Voltage criteria for left ventricular hypertrophy  Nonspecific T wave abnormality  Abnormal ECG  When compared with ECG of 25-MAR-2020 13:54,  The axis Shifted left  Criteria for Septal infarct are  no longer Present  Nonspecific T wave abnormality no longer evident in Inferior leads    Referred By: [email or web address]   SELF           Confirmed By:                               No results found for this or any previous visit.    X-Ray Chest AP Portable  Narrative: EXAMINATION:  XR CHEST AP PORTABLE    CLINICAL HISTORY:  Fever, COVID+;    TECHNIQUE:  Single frontal view of the chest was performed.    COMPARISON:  03/25/2020 and 03/21/2020    FINDINGS:  The patient is rotated on this exam limiting evaluation of the left lung.  Platelike airspace opacities are identified in the left upper and left lower lobes.  No focal airspace consolidation.  No pneumothorax or pleural effusion.  The cardiomediastinal silhouette is normal.    Incidental note is made of a right subclavian vascular stent.  The osseous and soft tissue structures are normal.  Impression: Findings compatible with platelike atelectasis in the left upper and lower lobes.  Overall no significant interval change.    Electronically signed by: Kassy Oro MD  Date:    03/28/2020  Time:    12:38      All imaging within the last 24 hours was reviewed.     Assessment and Plan:    Active Hospital Problems    Diagnosis  POA    *COVID-19 virus infection [J22, B97.29]  Yes    Fever [R50.9]  Yes    ESRD on dialysis [N18.6, Z99.2]  Not Applicable    Seizure [R56.9]  Yes    Chronic blood loss anemia [D50.0]  Yes    Personal history of latent tuberculosis infection [Z86.15]  Yes    Severe malnutrition [E43]  Yes     Assessment and Plan  Severe Malnutrition in the context of Social/Environmental Circumstances     Related to (etiology):  Unknown etiology     Signs and Symptoms (as evidenced by):  Energy Intake: per pt, <75% intake over the last year  Body Fat Depletion: overall subcutaneous fat loss, moderate triceps fat loss and protruding patellas.  Muscle Mass Depletion:  moderate muscle wasting of interosseous, temporal, clavicle, calves and quadriceps  Weight  Loss: 24% (39 lbs) x 1 year    Recommendations     Recommendation/Intervention: 1. Should pt be cleared for po diet, recommend renal diet with texture per SLP along with Novasource ONS TID. 2. Should pt require enteral feeding, initiate Novasource renal formula at 10ml/hr and advance 10ml Q4hrs to goal rate of 40ml/hr (provides 1920kcal, 87g pro, 691ml free water). Provide additional 500ml water flush/24 hrs. Hold for residuals >500ml.  Goals: 1. Pt to receive nutrition < 48 hrs.      History of GI bleed [Z87.19]  Not Applicable    Renovascular hypertension [I15.0]  Yes     Chronic    Pulmonary hypertension associated with end-stage renal disease (ESRD) on dialysis [I27.29, N18.6, Z99.2]  Not Applicable     Chronic    Chronic diastolic heart failure [I50.32]  Yes     Chronic     2-23-17    1 - Low normal to mildly depressed left ventricular systolic function (EF 50-55%).     2 - Right ventricular enlargement with mildly depressed systolic function.     3 - Left ventricular diastolic dysfunction.     4 - Right atrial enlargement.     5 - Severe tricuspid regurgitation.     6 - Pulmonary hypertension. The estimated PA systolic pressure is 86 mmHg.     7 - Increased central venous pressure.       Secondary hyperparathyroidism of renal origin [N25.81]  Yes      Resolved Hospital Problems   No resolved problems to display.       Suspected Covid-19 Virus Infection  Person Under Investigation (PUI) for COVID-19  - COVID-19 testing: Collection Date: 3/25/2020 Collection Time:   2:10 PM  - Infection Control notified    - Isolation:   - Airborne and Droplet Precautions  - Surgical mask on patient   - N95 masks must be fit tested, wear eye protection  - 20 second hand hygiene   - Limit visitors per hospital policy   - Consolidate lab draws, nursing care, and interventions    - Diagnostics: (Lymphopenia, hyponatremia, hyperferritinemia, elevated troponin, elevated d-dimer, age, and comorbidities are significant predictors  of poor clinical outcome)   - CBC:   trend Q48hrs  - CMP:        trend Q48hrs  - Procalcitonin:  - D-dimer:  repeat prior to discharge  - Ferritin:  repeat prior to discharge   - CRP:        trend Q48hrs  - LDH:   repeat prior to discharge  - BNP:  - Troponin:    - ECG:   - rapid Flu:   - RIP only if BMT/solid transplant:   - Legionella antigen:   - Blood culture x2:   - Sputum culture:   - CXR:   - UA and culture:   - CPK:    - Management:   Bundle care as able to maintain isolation & minimize in/out of room   - Supplemental O2 to maintain SpO2 92%-96%   if requiring 6L NC or higher, place on nonrebreather and discuss case with MICU   - Telemetry & continuous pulse oximetry    - If wheezing   - albuterol inhaler 2-4 puff Q6hr PRN    - ipratropium daily    - acetaminophen 650mg PO Q6hr PRN fever   - loperamide PRN for viral diarrhea  - Empiric antibiotics per likely source & patient allergies    - CAP: x 5 day course (d/c early if low concern for bacterial co-infection)  Ceftriaxone 1g IV Q24hrs            Azithromycin 500mg IV day #1, then 250mg PO daily x4 days     If azithromycin is not available, start doxycycline                 If MRSA risk factors, add Vancomycin IV (PharmD consult)   - Investigational Treatment Protocol: (if patient meets criteria)   [email or web address]     - statin: atorvastatin 40mg po daily (if CPK WNL)    - start HCQ 400mg PO BID x1 day, then 400mg PO daily x 4 days   (check glucose 6 phosphate dehydrogenase (NOT G6PD Quant), ECG, and start Qshift POCT glucose)    Safety notes:   - Avoid NIPPV (CPAP/BiPAP) to prevent aerosolization, use on a case-by-case basis if in neg pressure room   - Cautious use of NSAIDS for fever per WHO recommendations (3/16/2020)   - No new ACEi/ARB start or discontinuation of chronic med unless hypotensive (Esler et al. Journal of Hypertension 2020, 38:000-000)   -  Careful use of steroids in the absence of other indications (shock, ARDS)   - Fluid sparing resuscitation, avoid maintenance fluids     Advance Care Planning  Goals of care, counseling/discussion In light of the patients advanced and life limiting illness,I engaged the the patient's sister, Alicia Retana, in a conversation about the patient's preferences for care  at the very end of life. The patient wishes to have a natural, peaceful death.  Along those lines, the patient does not wish to have CPR or other invasive treatments performed when his heart and/or breathing stops. I communicated to Alicia Retana that a DNR form was completed and will be scanned into EPIC.  I spent a total of 20 minutes engaging the patient in this advance care planning discussion.    Elevated Troponin  Trop 1.3, repeat pending  EKG no acute ischemic changes  Patient not complaining of CP  Suspect elevated in setting of COVID19 infection, ESRD     Renovascular Hypertension              Pulmonary Hypertension with ESRD on Dialysis   Continue regular dialysis MWF  Continue home hydralazine prn SBP >170  BP controlled on admission     History of GI bleed   Chronic blood loss anemia   Hgb 10.1 at baseline  Continue home Protonix     Seizures  Continue home Keppra      Severe malnutrition   Nursing home confirmed patient eats orally and receives supplemental Novasource through PEG   Will continue Novasource 50 cc/hr x 10 hrs    VTE High Risk Prophylaxis: enoxaparin 40mg sq QHS @ 2100 (bundled care) if GFR >30    Patient's chronic/stable medical conditions noted in the problem list above will be managed with the patient's home medications as tolerated.       [facility] [id number] Total visit time was 35 minutes or greater with greater than 50% of time spent in counseling and coordination of care.     Rekha Denson PA-C  Hospital Medicine  [facility]

## 2020-03-29 NOTE — CONSULTS
[facility]  Nephrology  Consult Note    Patient Name: Vaughn Retana  MRN: [id number]  Admission Date: 3/28/2020  Hospital Length of Stay: 0 days  Attending Provider: Tawanda Garcia MD   Primary Care Physician: Cori Randall MD  Principal Problem:COVID-19 virus infection    Inpatient consult to Nephrology  Consult performed by: Yenny Justice MD  Consult ordered by: Charissa Abraham PA-C        Subjective:     HPI: 54 y.o. male with significant past medical history of ESRD ([facility], [facility]) presents from NH with + COVID19 test. His hx significant for hypertension, intracranial hemorrhage, L BKA for osteomyelitis,  past DVT, heart failure including 2cndry pulmonary hypertension with normal EF (last 2017), hx upper GI bleeds, PEG status, seizures, latent TB. Pt has had a recent hx of fever, nausea, and vomiting. He was in the ED for these symptoms on 3/16/20 and 3/21/20, workup unremarkable.     Vaughn was transferred back to ED when his COVID-19 test resulted as positive.    Attempted to call patient via room phone x 2, both times line was busy. Information in this note obtained from EMR.    Past Medical History:   Diagnosis Date    Amputation stump pain 9/10/2013    Aspiration pneumonia 7/27/2015    Asterixis 11/8/2016    C. difficile colitis 8/7/2015    Cholelithiasis without obstruction 8/25/2015    Chronic diastolic heart failure     2-23-17   1 - Low normal to mildly depressed left ventricular systolic function (EF 50-55%).    2 - Right ventricular enlargement with mildly depressed systolic function.    3 - Left ventricular diastolic dysfunction.    4 - Right atrial enlargement.    5 - Severe tricuspid regurgitation.    6 - Pulmonary hypertension. The estimated PA systolic pressure is 86 mmHg.    7 - Increased central venous pressure.    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    Metabolic acidosis, IAG, reduced excretion of inorganic acids     Myoclonic jerking 9/20/2016    Noncompliance with medication regimen 12/4/2018    Secondary hyperparathyroidism (of renal origin)     Secondary pulmonary hypertension 3/23/2017    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 HD Patient MWF, K+ lab prior to procedure.     ESOPHAGOGASTRODUODENOSCOPY N/A 6/12/2018    Procedure: EGD (ESOPHAGOGASTRODUODENOSCOPY);  Surgeon: Man Galicia MD;  Location: [facility] ([address] [place] [address]);  Service: Endoscopy;  Laterality: N/A;  EGD in 8-12 weeks with Dr. Galicia on 4th floor for follow up erosive esophagitis and Mejia's surveillance.    Patient should be on Pantoprazole 40mg every 12 hours or the equivulant of another PPI    awaiting for patient to reply back regarding changing    ESOPHAGOGASTRODUODENOSCOPY N/A 3/7/2019    Procedure: EGD (ESOPHAGOGASTRODUODENOSCOPY);  Surgeon: Man Galicia MD;  [facility]  Service: Endoscopy;  Laterality: N/A;    ESOPHAGOGASTRODUODENOSCOPY N/A 9/23/2019    Procedure: EGD (ESOPHAGOGASTRODUODENOSCOPY);  Surgeon: Keanu Rainey MD;  Location: [facility]  ENDO (2ND FLR);  Service: Endoscopy;  Laterality: N/A;    ESOPHAGOGASTRODUODENOSCOPY N/A 10/2/2019    Procedure: EGD (ESOPHAGOGASTRODUODENOSCOPY);  Surgeon: Kevin De La Paz MD;  Location: [facility] (2ND FLR);  Service: Endoscopy;  Laterality: N/A;    ESOPHAGOGASTRODUODENOSCOPY N/A 11/12/2019    Procedure: EGD (ESOPHAGOGASTRODUODENOSCOPY);  Surgeon: Kevin De La Paz MD;  Location: [facility] (2ND FLR);  Service: Endoscopy;  Laterality: N/A;    ESOPHAGOGASTRODUODENOSCOPY N/A 2/14/2020    Procedure: EGD (ESOPHAGOGASTRODUODENOSCOPY);  Surgeon: Kevin De La Paz MD;  Location: [facility] (2ND FLR);  Service: Endoscopy;  Laterality: N/A;    FOOT AMPUTATION THROUGH METATARSAL      left foot    LEG AMPUTATION THROUGH KNEE  12/18/2013    left BKA    R AVF  9/12/12    UPPER GASTROINTESTINAL ENDOSCOPY         Review of patient's allergies indicates:   Allergen Reactions    Fosrenol [lanthanum] Nausea And Vomiting     Nausea and vomiting     Current Facility-Administered Medications   Medication Frequency    acetaminophen tablet 650 mg Q4H PRN    albuterol inhaler 2 puff Q6H WAKE    atorvastatin tablet 40 mg QHS    cefTRIAXone injection 1 g Q24H    dextromethorphan-guaifenesin  mg/5 ml liquid 10 mL Q4H PRN    dextrose 10% (D10W) Bolus PRN    dextrose 10% (D10W) Bolus PRN    doxycycline tablet 100 mg Q12H    glucagon (human recombinant) injection 1 mg PRN    glucose chewable tablet 16 g PRN    glucose chewable tablet 24 g PRN    hydrALAZINE tablet 25 mg Q8H PRN    levetiracetam oral soln Soln 250 mg BID    melatonin tablet 6 mg Nightly PRN    metoclopramide HCl tablet 10 mg TID AC    [START ON 3/30/2020] midodrine tablet 5 mg Every Mon, Wed, Fri    ondansetron disintegrating tablet 8 mg Q8H PRN    pantoprazole injection 40 mg Daily    promethazine (PHENERGAN) 6.25 mg in dextrose 5 % 50 mL IVPB Q6H PRN    senna tablet 2 tablet Daily    sevelamer carbonate tablet 800 mg TID WM    sodium  chloride 0.9% flush 10 mL PRN     Family History     Problem Relation (Age of Onset)    Alcohol abuse Maternal Grandmother    Diabetes Brother, Maternal Grandfather    Early death Mother    Heart disease Father    Hyperlipidemia Father    Hypertension Father, Sister    Kidney disease Father        Tobacco Use    Smoking status: Former Smoker     Packs/day: 1.00     Years: 10.00     Pack years: 10.00    Smokeless tobacco: Never Used   Substance and Sexual Activity    Alcohol use: No    Drug use: No    Sexual activity: Yes     Partners: Female     Birth control/protection: None     Review of Systems   Constitutional: Positive for fatigue and fever.   HENT: Negative.         Denies ENT concerns.   Eyes: Negative for visual disturbance.   Respiratory: Positive for shortness of breath.    Cardiovascular: Negative for chest pain and palpitations.   Gastrointestinal: Negative for abdominal pain.   Genitourinary: Negative for difficulty urinating and hematuria.   Musculoskeletal: Negative for back pain.   Skin: Negative for rash.   Neurological: Negative for weakness.   Hematological: Does not bruise/bleed easily.   Psychiatric/Behavioral: Negative for sleep disturbance.     Objective:     Vital Signs (Most Recent):  Temp: 98.9 °F (37.2 °C) (03/29/20 0917)  Pulse: 106 (03/29/20 1100)  Resp: 18 (03/29/20 0004)  BP: 110/64 (03/29/20 0917)  SpO2: 97 % (03/29/20 1100)  O2 Device (Oxygen Therapy): room air (03/28/20 2314) Vital Signs (24h Range):  Temp:  [97.2 °F (36.2 °C)-99.2 °F (37.3 °C)] 98.9 °F (37.2 °C)  Pulse:  [] 106  Resp:  [16-19] 18  SpO2:  [95 %-98 %] 97 %  BP: (106-118)/(55-72) 110/64     Weight: 54 kg (119 lb 0.8 oz) (03/28/20 2200)  Body mass index is 19.21 kg/m².  Body surface area is 1.59 meters squared.    I/O last 3 completed shifts:  In: 240 [P.O.:180; NG/GT:60]  Out: -     Physical Exam unable to perform d/t COVID-19 status  Gen: NAD, lethargic, pt not conversant   Head: NC, AT  Eyes:  EOMI  Throat: MMM, OP clear  CV: RRR, no M/R/G, no peripheral edema, no JVD  Resp: no increased work of breathing on room air   GI: Soft, NT, ND, +BS  Ext: MAEW, no c/c/e  Neuro: Oriented to person, no focal neurologic deficits.    Psychiatry: lethargic    Significant Labs:  Recent Labs   Lab 03/25/20  1401 03/28/20  1226 03/28/20  2045   * 138 136   K 3.3* 4.1  --    CL 96 96  --    CO2 25 26  --    BUN 38* 44*  --    CREATININE 7.6* 6.7*  --    CALCIUM 7.7* 7.9*  --         Assessment/Plan:     * COVID-19 virus infection  -Per hospital med    ESRD on dialysis  -Continue HD MWF as per usual outpt schedule    Secondary hyperparathyroidism of renal origin  Last phos was 3.1 on 3/21--> will add to labs drawn AM 3/29        Thank you for your consult. ESRD service will follow in AM to ensure patient has dialysis as scheduled    Yenny Justice MD  Nephrology  [facility]

## 2020-03-30 NOTE — ASSESSMENT & PLAN NOTE
-Continue HD MWF     -Hgb 8.2 (and has trended down form 10.2 3/25/20)  PMHx: GIB  Ferritin 1762 3/28/20  Managed by Hospital Medicine    Corrected Ca+ 9.24  Ph- 2.5  -sevelamer carbonate to BID  On renal  -receiving Novasource with tube feeds

## 2020-03-30 NOTE — PLAN OF CARE
CM spoke with pts sister to see if she is able to provide any in home assistance with providing care for her bother since his NH is no longer accepting COVID positive patients. She said she is not able to provide the care he needs, but she is going to speak with other family members to see if anyone is able to assist. RUSTY will continue to follow.     Saul Parra RN Case Manager   #[phone]

## 2020-03-30 NOTE — SUBJECTIVE & OBJECTIVE
Interval History: Nephrology consulted for ESRD Management    Review of patient's allergies indicates:   Allergen Reactions    Fosrenol [lanthanum] Nausea And Vomiting     Nausea and vomiting     Current Facility-Administered Medications   Medication Frequency    0.9%  NaCl infusion PRN    0.9%  NaCl infusion Once    acetaminophen tablet 650 mg Q4H PRN    albuterol inhaler 2 puff Q6H PRN    atorvastatin tablet 40 mg QHS    cefTRIAXone injection 1 g Q24H    dextromethorphan-guaifenesin  mg/5 ml liquid 10 mL Q4H PRN    dextrose 10% (D10W) Bolus PRN    dextrose 10% (D10W) Bolus PRN    doxycycline tablet 100 mg Q12H    glucagon (human recombinant) injection 1 mg PRN    glucose chewable tablet 16 g PRN    glucose chewable tablet 24 g PRN    hydrALAZINE tablet 25 mg Q8H PRN    levetiracetam oral soln Soln 250 mg BID    melatonin tablet 6 mg Nightly PRN    metoclopramide HCl tablet 10 mg TID AC    midodrine tablet 5 mg Every Mon, Wed, Fri    ondansetron disintegrating tablet 8 mg Q8H PRN    pantoprazole injection 40 mg Daily    promethazine (PHENERGAN) 6.25 mg in dextrose 5 % 50 mL IVPB Q6H PRN    senna tablet 2 tablet Daily    sevelamer carbonate tablet 800 mg TID WM    sodium chloride 0.9% flush 10 mL PRN       Objective:     Vital Signs (Most Recent):  Temp: 98.9 °F (37.2 °C) (03/30/20 0746)  Pulse: 95 (03/30/20 1130)  Resp: 16 (03/30/20 1008)  BP: (!) 153/85 (03/30/20 0746)  SpO2: 95 % (03/30/20 1008)  O2 Device (Oxygen Therapy): room air (03/30/20 1008) Vital Signs (24h Range):  Temp:  [98.9 °F (37.2 °C)-99.4 °F (37.4 °C)] 98.9 °F (37.2 °C)  Pulse:  [] 95  Resp:  [16-20] 16  SpO2:  [95 %-100 %] 95 %  BP: (102-153)/(50-85) 153/85     Weight: 54 kg (119 lb 0.8 oz) (03/28/20 2200)  Body mass index is 19.21 kg/m².  Body surface area is 1.59 meters squared.    I/O last 3 completed shifts:  In: 480 [P.O.:360; NG/GT:120]  Out: -     Physical Exam    Significant Labs:  All labs within the  past 24 hours have been reviewed.     Significant Imaging:  Labs: Reviewed

## 2020-03-30 NOTE — CONSULTS
"  [facility]  Adult Nutrition  Consult Note    SUMMARY     Recommendations    1. Continue renal diet as tolerated.    - Encourage po intake.     2. Pt eating ~25% of meals. TF not meeting needs.    - If po intake continues to be poor, recommend Novasource Renal advancing ad tolerated to goal rate 40 mL/hr to provide 1680 kcal, 76 gm protein, and 602 mL free fluid.     3. RD following.     Goals: meet % EEN/EPN by RD follow-up  Nutrition Goal Status: new  Communication of RD Recs: (POC)    Reason for Assessment    Reason For Assessment: consult  Diagnosis: (COVID-19 virus infection)  Relevant Medical History: HTN, intracranial bleed, ESRD on dialysis, L BKA, DVT, heart failure, hx upper GI bleeds, PEG status, seizures, latent TB  Interdisciplinary Rounds: did not attend  General Information Comments: Remote access, unable to reach anyone via phone. Noted pt tolerated TF x 10 hrs. Per chart review, pt eating 25% of meals. No significant wt loss x 6 months. Noted pt dx with malnutrition last admit. Unable to re-assess at this time. Due to recent hospital wide restrictions to limit the transfer of COVID-19, we are not performing any physical exams at this time. All S/S will be observational; NFPE to be performed at a future date.   Nutrition Discharge Planning: adequate nutrition via po intake and TF    Nutrition Risk Screen    Nutrition Risk Screen: tube feeding or parenteral nutrition    Nutrition/Diet History    Spiritual, Cultural Beliefs, Roman Catholic Practices, Values that Affect Care: no  Factors Affecting Nutritional Intake: decreased appetite    Anthropometrics    Temp: 98.9 °F (37.2 °C)  Height: 5' 6" (167.6 cm)  Height (inches): 66 in  Weight Method: Bed Scale  Weight: 54 kg (119 lb 0.8 oz)  Weight (lb): 119.05 lb  Ideal Body Weight (IBW), Male: 142 lb  % Ideal Body Weight, Male (lb): 83.84 %  BMI (Calculated): 19.2  BMI Grade: 18.5-24.9 - normal  Weight Loss: unintentional  Usual Body " Weight (UBW), k.5 kg(per chart review 19)  % Usual Body Weight: 94.11  % Weight Change From Usual Weight: -6.09 %     Lab/Procedures/Meds    Pertinent Labs Reviewed: reviewed  Pertinent Labs Comments: Na 133, BUN 67, Cr 8.6, GFR 7.2, glucose 136, phos 2.5, ALT 9, .8  Pertinent Medications Reviewed: reviewed  Pertinent Medications Comments: statin, senna    Estimated/Assessed Needs    Weight Used For Calorie Calculations: 54 kg (119 lb 0.8 oz)  Energy Calorie Requirements (kcal): 1646 kcal/day  Energy Need Method: [place]-St Jeor(x 1.25)  Protein Requirements: 64 gm/day(>1.2 gm/kg)  Weight Used For Protein Calculations: 54 kg (119 lb 0.8 oz)  Fluid Requirements (mL): 1 mL/kcal or per MD     RDA Method (mL): 1646     Nutrition Prescription Ordered    Current Diet Order: Renal   Current Nutrition Support Formula Ordered: Novasource Renal  Current Nutrition Support Rate Ordered: 50 (ml)  Current Nutrition Support Frequency Ordered: mL/hr x 10 hrs    Evaluation of Received Nutrient/Fluid Intake    Enteral Calories (kcal): 500  Enteral Protein (gm): 45  Enteral (Free Water) Fluid (mL): 359  % Kcal Needs: 30  % Protein Needs: 64  Energy Calories Required: not meeting needs  Protein Required: not meeting needs  Fluid Required: (per MD)  Comments: LBM 3/28  Tolerance: tolerating  % Intake of Estimated Energy Needs: 50 - 75 %  % Meal Intake: Other: 25%    Nutrition Risk    Level of Risk/Frequency of Follow-up: low(f/u 1 x wk)     Assessment and Plan    Nutrition Problem  Inadequate Energy Intake    Related to (etiology):   Decreased appetite and Inadequate Enteral Nutrition Infusion     Signs and Symptoms (as evidenced by):   Pt meeting <75% EEN/EPN via po intake and TF    Interventions (treatment strategy):  Collaboration of nutrition care with other providers  Enteral Nutrition     Nutrition Diagnosis Status:   New     Monitor and Evaluation    Food and Nutrient Intake: energy intake, food and beverage  intake, enteral nutrition intake  Food and Nutrient Adminstration: diet order, enteral and parenteral nutrition administration  Physical Activity and Function: nutrition-related ADLs and IADLs  Anthropometric Measurements: weight, weight change, body mass index  Biochemical Data, Medical Tests and Procedures: electrolyte and renal panel, gastrointestinal profile, glucose/endocrine profile, inflammatory profile, lipid profile  Nutrition-Focused Physical Findings: overall appearance     Nutrition Follow-Up    RD Follow-up?: Yes

## 2020-03-30 NOTE — PROGRESS NOTES
"Hospital Medicine  Progress Note  [facility]      Patient Name: Vaughn Retana  MRN:  [id number]  Hospital Medicine Team: [facility] Rekha Denson PA-C  Date of Admission:  3/28/2020     Length of Stay:  LOS: 0 days       Principal Problem:  COVID-19 virus infection      Hospital Course:  Patient admitted from NH for of COVID-19. Has maintained sats ORA    Interval History:     No reported events overnight. Maintaining sats ORA. Pt still not very cooperative with exam, he shakes his head no to ROS questions. Per CM, [facility] is not accepting previous assisted patients. Will await updates  Review of Systems:  Patient shakes head "no" when asked about shortness of breath, chest pain, or cough.     Inpatient Medications:    Current Facility-Administered Medications:     0.9%  NaCl infusion, , Intravenous, PRN, Nayeli Orozco, FNP    0.9%  NaCl infusion, , Intravenous, Once, Nayeli Orozco, FNP    acetaminophen tablet 650 mg, 650 mg, Oral, Q4H PRN, PRATIK Weir-[place], 650 mg at 03/29/20 2124    albuterol inhaler 2 puff, 2 puff, Inhalation, Q6H PRN **AND** MDI Q6H PRN, , , Q6H PRN, Ricky Morgan MD    atorvastatin tablet 40 mg, 40 mg, Per G Tube, QHS, PRATIK Archuleta-[place], 40 mg at 03/29/20 2124    cefTRIAXone injection 1 g, 1 g, Intravenous, Q24H, PRATIK Weir-[place], 1 g at 03/30/20 0830    dextromethorphan-guaifenesin  mg/5 ml liquid 10 mL, 10 mL, Oral, Q4H PRN, Charissa Abraham PA-C    dextrose 10% (D10W) Bolus, 12.5 g, Intravenous, PRN, Michael Potter MD    dextrose 10% (D10W) Bolus, 25 g, Intravenous, PRN, Michael Potter MD    doxycycline tablet 100 mg, 100 mg, Per G Tube, Q12H, PRATIK Archuleta-[place], 100 mg at 03/30/20 0828    glucagon (human recombinant) injection 1 mg, 1 mg, Intramuscular, PRN, Charissa Abraham, PA-FEDERICA    glucose chewable tablet 16 g, 16 g, Oral, PRN, Charissa [place], PA-C    glucose chewable tablet 24 g, 24 g, Oral, PRN, Charissa " "MITCH Abraham    hydrALAZINE tablet 25 mg, 25 mg, Oral, Q8H PRN, Charissa Abraham PA-C    levetiracetam oral soln Soln 250 mg, 250 mg, Per G Tube, BID, Aleja Donovan PA-C, 250 mg at 03/30/20 0827    melatonin tablet 6 mg, 6 mg, Oral, Nightly PRN, Charissa Abraham PA-C, 6 mg at 03/29/20 2123    metoclopramide HCl tablet 10 mg, 10 mg, Per G Tube, TID AC, Aleja Donovan PA-C, 10 mg at 03/30/20 0536    midodrine tablet 5 mg, 5 mg, Per G Tube, Every Mon, Wed, Fri, Aleja Donovan PA-C    ondansetron disintegrating tablet 8 mg, 8 mg, Oral, Q8H PRN, Charissa Abraham PA-C    pantoprazole injection 40 mg, 40 mg, Intravenous, Daily, Aleja Donovan PA-C, 40 mg at 03/30/20 0829    promethazine (PHENERGAN) 6.25 mg in dextrose 5 % 50 mL IVPB, 6.25 mg, Intravenous, Q6H PRN, Charissa Abraham PA-C    senna tablet 2 tablet, 2 tablet, Per G Tube, Daily, Aleja Donovan PA-C, 2 tablet at 03/29/20 0913    sevelamer carbonate tablet 800 mg, 800 mg, Per G Tube, TID WM, Aleja Donovan PA-C, 800 mg at 03/30/20 0827    sodium chloride 0.9% flush 10 mL, 10 mL, Intravenous, PRN, Michael Potter MD      Physical Exam:      Intake/Output Summary (Last 24 hours) at 3/30/2020 1048  Last data filed at 3/29/2020 2200  Gross per 24 hour   Intake 180 ml   Output --   Net 180 ml     Wt Readings from Last 3 Encounters:   03/28/20 54 kg (119 lb 0.8 oz)   03/25/20 54.4 kg (120 lb)   03/21/20 54.4 kg (120 lb)       BP (!) 153/85 (BP Location: Right arm, Patient Position: Lying)   Pulse 76   Temp 98.9 °F (37.2 °C) (Oral)   Resp 16   Ht 5' 6" (1.676 m)   Wt 54 kg (119 lb 0.8 oz)   SpO2 95%   BMI 19.21 kg/m²     GEN: NAD, not conversant  Resp: normal work of breathing   CV: no edema  MSK: L BKA  Neuro: awake, alert, will nod yes and no to some questions; not participatory in exam or questioning    Laboratory:  Lab Results   Component Value Date    [id number] Detected (A) 03/25/2020       Recent Labs   Lab 03/25/20  1401 03/28/20  1226 " 03/28/20 2045 03/30/20  0009   WBC 4.39 5.53  --  5.35   LYMPH 22.6  1.0 21.3  1.2  --  17.4*  0.9*   HGB 10.2* 10.1* 9.5* 8.2*   HCT 32.4* 32.1* 32.0* 26.3*    223  --  249     Recent Labs   Lab 03/25/20  1401 03/28/20  1226 03/28/20 2045 03/30/20  0009   * 138 136 133*   K 3.3* 4.1  --  3.7   CL 96 96  --  94*   CO2 25 26  --  22*   BUN 38* 44*  --  67*   CREATININE 7.6* 6.7*  --  8.6*   * 91  --  136*   CALCIUM 7.7* 7.9*  --  7.8*   MG  --   --   --  2.0   PHOS  --   --   --  2.5*     Recent Labs   Lab 03/25/20  1401 03/28/20  1226 03/30/20  0009   ALKPHOS 103 95 81   ALT 7* 7* 9*   AST 28 37 36   ALBUMIN 2.5* 2.4* 2.2*   PROT 7.6 7.9 7.5   BILITOT 0.3 0.4 0.4        Recent Labs     03/28/20  1226 03/28/20 1344 03/29/20 1357 03/30/20  0009   DDIMER  --  0.98*  --   --   --    FERRITIN 1,762*  --   --   --   --    .1*  --   --   --  132.8*   LDH  --   --    < >  --  352*   BNP 1,089*  --   --   --   --    TROPONINI 1.344*  --   --  1.031*  --    CPK  --  202*  --   --   --     < > = values in this interval not displayed.       All labs within the last 24 hours were reviewed.     Microbiology:  Microbiology Results (last 7 days)     Procedure Component Value Units Date/Time    Blood culture [[id number]] Collected:  03/28/20 1407    Order Status:  Completed Specimen:  Blood from Peripheral, Forearm, Left Updated:  03/29/20 1915     Blood Culture, Routine No Growth to date    Blood culture [[id number]] Collected:  03/28/20 1345    Order Status:  Completed Specimen:  Blood from Peripheral, Antecubital, Left Updated:  03/29/20 1915     Blood Culture, Routine No Growth to date            Imaging  ECG Results          EKG 12-lead (Final result)  Result time 03/29/20 23:09:47    Final result by Interface, Lab In [facility] (03/29/20 23:09:47)                 Narrative:    Test Reason : R06.02,    Vent. Rate : 103 BPM     Atrial Rate : 103 BPM     P-R Int : 146 ms          QRS Dur : 098 ms       QT Int : 382 ms       P-R-T Axes : 089 -49 066 degrees     QTc Int : 500 ms    Sinus tachycardia  Right ventricular conduction delay  Left anterior fascicular block  Voltage criteria for left ventricular hypertrophy  Nonspecific T wave abnormality  Abnormal ECG  When compared with ECG of 25-MAR-2020 13:54,  The axis Shifted left  Nonspecific T wave abnormality no longer evident in Inferior leads  Confirmed by Zeke Burns MD ([id number]) on 3/29/2020 11:09:42 PM    Referred By: [email or web address]   SELF           Confirmed By:Zeke Burns MD                              No results found for this or any previous visit.    X-Ray Chest AP Portable  Narrative: EXAMINATION:  XR CHEST AP PORTABLE    CLINICAL HISTORY:  Fever, COVID+;    TECHNIQUE:  Single frontal view of the chest was performed.    COMPARISON:  03/25/2020 and 03/21/2020    FINDINGS:  The patient is rotated on this exam limiting evaluation of the left lung.  Platelike airspace opacities are identified in the left upper and left lower lobes.  No focal airspace consolidation.  No pneumothorax or pleural effusion.  The cardiomediastinal silhouette is normal.    Incidental note is made of a right subclavian vascular stent.  The osseous and soft tissue structures are normal.  Impression: Findings compatible with platelike atelectasis in the left upper and lower lobes.  Overall no significant interval change.    Electronically signed by: Kassy Oro MD  Date:    03/28/2020  Time:    12:38      All imaging within the last 24 hours was reviewed.     Assessment and Plan:    Active Hospital Problems    Diagnosis  POA    *COVID-19 virus infection [J22, B97.29]  Yes    Fever [R50.9]  Yes    ESRD on dialysis [N18.6, Z99.2]  Not Applicable    Seizure [R56.9]  Yes    Chronic blood loss anemia [D50.0]  Yes    Personal history of latent tuberculosis infection [Z86.15]  Yes    Severe malnutrition [E43]  Yes     Assessment and Plan  Severe Malnutrition in the context of  Social/Environmental Circumstances     Related to (etiology):  Unknown etiology     Signs and Symptoms (as evidenced by):  Energy Intake: per pt, <75% intake over the last year  Body Fat Depletion: overall subcutaneous fat loss, moderate triceps fat loss and protruding patellas.  Muscle Mass Depletion:  moderate muscle wasting of interosseous, temporal, clavicle, calves and quadriceps  Weight Loss: 24% (39 lbs) x 1 year    Recommendations     Recommendation/Intervention: 1. Should pt be cleared for po diet, recommend renal diet with texture per SLP along with Novasource ONS TID. 2. Should pt require enteral feeding, initiate Novasource renal formula at 10ml/hr and advance 10ml Q4hrs to goal rate of 40ml/hr (provides 1920kcal, 87g pro, 691ml free water). Provide additional 500ml water flush/24 hrs. Hold for residuals >500ml.  Goals: 1. Pt to receive nutrition < 48 hrs.      History of GI bleed [Z87.19]  Not Applicable    Renovascular hypertension [I15.0]  Yes     Chronic    Pulmonary hypertension associated with end-stage renal disease (ESRD) on dialysis [I27.29, N18.6, Z99.2]  Not Applicable     Chronic    Chronic diastolic heart failure [I50.32]  Yes     Chronic     2-23-17    1 - Low normal to mildly depressed left ventricular systolic function (EF 50-55%).     2 - Right ventricular enlargement with mildly depressed systolic function.     3 - Left ventricular diastolic dysfunction.     4 - Right atrial enlargement.     5 - Severe tricuspid regurgitation.     6 - Pulmonary hypertension. The estimated PA systolic pressure is 86 mmHg.     7 - Increased central venous pressure.       Secondary hyperparathyroidism of renal origin [N25.81]  Yes      Resolved Hospital Problems   No resolved problems to display.       Suspected Covid-19 Virus Infection  Person Under Investigation (PUI) for COVID-19  - COVID-19 testing: Collection Date: 3/25/2020 Collection Time:   2:10 PM  - Infection Control notified    - Isolation:    - Airborne and Droplet Precautions  - Surgical mask on patient   - N95 masks must be fit tested, wear eye protection  - 20 second hand hygiene   - Limit visitors per hospital policy   - Consolidate lab draws, nursing care, and interventions    - Diagnostics: (Lymphopenia, hyponatremia, hyperferritinemia, elevated troponin, elevated d-dimer, age, and comorbidities are significant predictors of poor clinical outcome)     - CBC: Hgb 10.1                     trend Q48hrs  - CMP: BUN 44, Cr 6.7           trend Q48hrs  - Procalcitonin: 2.24  - D-dimer: 0.98                                    repeat prior to discharge  - Ferritin: 1762                         repeat prior to discharge   - CRP: 185.1    >132                       trend Q48hrs downtrending  - LDH:  ordered                        repeat prior to discharge  - BNP: ordered  - Troponin: 1.344                      - ECG: estrella acute ischemic changes              - rapid Flu: negative on 3/21/20              - RIP only if BMT/solid transplant:              - Legionella antigen: ordered              - Blood culture x2: NGTD              - Sputum culture: ordered               - CXR: platelike atelectasis in the left upper and lower lobes              - UA and culture: ordered              - CPK: 202    - Management:   Bundle care as able to maintain isolation & minimize in/out of room   - Supplemental O2 to maintain SpO2 92%-96%   if requiring 6L NC or higher, place on nonrebreather and discuss case with MICU   - Telemetry & continuous pulse oximetry    - If wheezing   - albuterol inhaler 2-4 puff Q6hr PRN    - ipratropium daily    - acetaminophen 650mg PO Q6hr PRN fever   - loperamide PRN for viral diarrhea  - Empiric antibiotics per likely source & patient allergies    - CAP: x 5 day course (d/c early if low concern for bacterial co-infection)  Ceftriaxone 1g IV Q24hrs            Azithromycin 500mg IV day #1, then 250mg PO daily x4 days     If azithromycin is not  available, start doxycycline                 If MRSA risk factors, add Vancomycin IV (PharmD consult)   - Investigational Treatment Protocol: (if patient meets criteria)   [email or web address]     - statin: atorvastatin 40mg po daily (if CPK WNL)    - start HCQ 400mg PO BID x1 day, then 400mg PO daily x 4 days   (check glucose 6 phosphate dehydrogenase (NOT G6PD Quant), ECG, and start Qshift POCT glucose)    Safety notes:   - Avoid NIPPV (CPAP/BiPAP) to prevent aerosolization, use on a case-by-case basis if in neg pressure room   - Cautious use of NSAIDS for fever per WHO recommendations (3/16/2020)   - No new ACEi/ARB start or discontinuation of chronic med unless hypotensive (Esler et al. Journal of Hypertension 2020, 38:000-000)   - Careful use of steroids in the absence of other indications (shock, ARDS)   - Fluid sparing resuscitation, avoid maintenance fluids     Advance Care Planning  Goals of care, counseling/discussion In light of the patients advanced and life limiting illness,I engaged the the patient's sister, Alicia Retana, in a conversation about the patient's preferences for care  at the very end of life. The patient wishes to have a natural, peaceful death.  Along those lines, the patient does not wish to have CPR or other invasive treatments performed when his heart and/or breathing stops. I communicated to Alicia Retana that a DNR form was completed and will be scanned into EPIC.  I spent a total of 20 minutes engaging the patient in this advance care planning discussion.    Elevated Troponin  Trop 1.3>1.0  EKG no acute ischemic changes  Patient not complaining of CP  Suspect elevated in setting of COVID19 infection, ESRD     Renovascular Hypertension              Pulmonary Hypertension with ESRD on Dialysis   Continue regular dialysis MWF  Continue home hydralazine prn SBP >170  BP controlled  on admission     History of GI bleed   Chronic blood loss anemia   Hgb 10.1 at baseline  Continue home Protonix     Seizures  Continue home Keppra      Severe malnutrition   Nursing home confirmed patient eats orally and receives supplemental Novasource through PEG   Will continue Novasource 50 cc/hr x 10 hrs    VTE High Risk Prophylaxis: enoxaparin 40mg sq QHS @ 2100 (bundled care) if GFR >30    Patient's chronic/stable medical conditions noted in the problem list above will be managed with the patient's home medications as tolerated.       Subsequent Inpatient Hospital CareLevel 3 [id number] Total visit time was 35 minutes or greater with greater than 50% of time spent in counseling and coordination of care.     Rekha Denson PA-C  [facility]  [facility]

## 2020-03-30 NOTE — PLAN OF CARE
CM obtained discharge planning assessment from Medical Record. Pt was [facility] at [facility]. Per Rosalie with [facility], they are not accepting any COVID 19 positive patient, not even previous [facility] patients. CM will continue to follow.     C- Deckbar- MWF        03/30/20 0946   Discharge Assessment   Assessment Type Discharge Planning Assessment   Confirmed/corrected address and phone number on facesheet? Yes   Assessment information obtained from? Medical Record   Expected Length of Stay (days) 4   Communicated expected length of stay with patient/caregiver yes   Prior to hospitilization cognitive status: Alert/Oriented   Prior to hospitalization functional status: Needs Assistance   Current cognitive status: Alert/Oriented   Current Functional Status: Needs Assistance   Facility Arrived From: [facility]    Lives With facility resident   Able to Return to Prior Arrangements no   Is patient able to care for self after discharge? No   Patient's perception of discharge disposition other (comments)  (NH vs Home )   Readmission Within the Last 30 Days no previous admission in last 30 days   Patient currently being followed by outpatient case management? No   Patient currently receives any other outside agency services? No   Equipment Currently Used at Home   (NH equipment )   Do you have any problems affording any of your prescribed medications? No   Is the patient taking medications as prescribed? yes   Does the patient have transportation home? Yes   Transportation Anticipated health plan transportation   Does the patient receive services at the [facility]? No   Discharge Plan A Home with family   Discharge Plan B New Nursing Home placement - [facility] care facility   DME Needed Upon Discharge  none   Patient/Family in Agreement with Plan yes

## 2020-03-30 NOTE — PLAN OF CARE
Problem: Feeding Intolerance (Enteral Nutrition)  Goal: Feeding Tolerance  Outcome: Ongoing, Progressing       Recommendations    1. Continue renal diet as tolerated.    - Encourage po intake.     2. Pt eating ~25% of meals. TF not meeting needs.    - If po intake continues to be poor, recommend Novasource Renal advancing ad tolerated to goal rate 40 mL/hr to provide 1680 kcal, 76 gm protein, and 602 mL free fluid.     3. RD following.     Goals: meet % EEN/EPN by RD follow-up  Nutrition Goal Status: new

## 2020-03-31 NOTE — PROGRESS NOTES
Hemodialysis tx initiated via RICHARD AVF, @ bedside, tolerated well, flows good. UF goal 2L. Isolation status maintained

## 2020-03-31 NOTE — ASSESSMENT & PLAN NOTE
-last HD Fri (3/27)  -s/p HD today  -clotted system approx 2-hr into 3-hr HD session  -TTS while IP, transition to MWF before d/c  -CBC, renal function panel with labs  -meds to renal parameters    -Hgb 8.2 3/30/20 (and has trended down from 10.2 3/25/20)  PMHx: GIB  Ferritin 1762 3/28/20  Will start epogen  Managed by Hospital Medicine    Corrected Ca+ 9.24  Ph- 2.5  -sevelamer carbonate to BID  On renal  -receiving Novasource with tube feeds    /79  -on midodrine    [facility]  MWF  3:45  AVF  DW 52.5kg    On cinacalcet 60 mg post-HD 3x/wk  -recommend

## 2020-03-31 NOTE — PLAN OF CARE
03/31/20 1232   Post-Acute Status   Post-Acute Authorization Dialysis;Placement   Post-Acute Placement Status Referrals Sent   Diaylsis Status   ([facility] deckbar switching HD chair to [facility])   Discharge Delays   ([facility] will not accept pt back due to covid + status)   Discharge Plan   Discharge Plan A New Nursing Home placement - [facility] care facility   Discharge Plan B   ([facility] covid unit)     Pt is a resident from [facility], unable to return due covid+ status. Referral sent to [facility] covid unit for review. CM initiated HD [facility] location. New schedule will be TTS, with a tentative start date of Sat 4/4. SW informed MD.    Nirmala Post, LCSW [id number]

## 2020-03-31 NOTE — SUBJECTIVE & OBJECTIVE
Interval History: Nephrology consulted for ESRD Management    Review of patient's allergies indicates:   Allergen Reactions    Fosrenol [lanthanum] Nausea And Vomiting     Nausea and vomiting     Current Facility-Administered Medications   Medication Frequency    0.9%  NaCl infusion PRN    0.9%  NaCl infusion Once    acetaminophen tablet 650 mg Q4H PRN    albuterol inhaler 2 puff Q6H PRN    atorvastatin tablet 40 mg QHS    cefTRIAXone injection 1 g Q24H    dextromethorphan-guaifenesin  mg/5 ml liquid 10 mL Q4H PRN    dextrose 10% (D10W) Bolus PRN    dextrose 10% (D10W) Bolus PRN    doxycycline tablet 100 mg Q12H    glucagon (human recombinant) injection 1 mg PRN    glucose chewable tablet 16 g PRN    glucose chewable tablet 24 g PRN    hydrALAZINE tablet 25 mg Q8H PRN    levetiracetam oral soln Soln 250 mg BID    melatonin tablet 6 mg Nightly PRN    metoclopramide HCl tablet 10 mg TID AC    midodrine tablet 5 mg Every Mon, Wed, Fri    ondansetron disintegrating tablet 8 mg Q8H PRN    pantoprazole injection 40 mg Daily    promethazine (PHENERGAN) 6.25 mg in dextrose 5 % 50 mL IVPB Q6H PRN    senna tablet 2 tablet Daily    sevelamer carbonate tablet 800 mg BID WM    sodium chloride 0.9% flush 10 mL PRN       Objective:     Vital Signs (Most Recent):  Temp: 96.8 °F (36 °C) (03/31/20 0749)  Pulse: 93 (03/31/20 1330)  Resp: 18 (03/31/20 1135)  BP: 127/79 (03/31/20 1330)  SpO2: 95 % (03/31/20 0817)  O2 Device (Oxygen Therapy): room air (03/31/20 1135) Vital Signs (24h Range):  Temp:  [96.8 °F (36 °C)-101.4 °F (38.6 °C)] 96.8 °F (36 °C)  Pulse:  [] 93  Resp:  [18-28] 18  SpO2:  [95 %-99 %] 95 %  BP: (111-168)/(67-89) 127/79     Weight: 54 kg (119 lb 0.8 oz) (03/28/20 2200)  Body mass index is 19.21 kg/m².  Body surface area is 1.59 meters squared.    I/O last 3 completed shifts:  In: 240 [P.O.:180; NG/GT:60]  Out: -     Physical Exam   Constitutional:   PE deferred to Primary Team d/t  COVID-19 precautions       Significant Labs:  All labs within the past 24 hours have been reviewed.     Significant Imaging:  Labs: Reviewed

## 2020-03-31 NOTE — PROGRESS NOTES
"Hospital Medicine  Progress Note  [facility]      Patient Name: Vaughn Retana  MRN:  [id number]  Hospital Medicine Team: [facility] Ray Brown MD  Date of Admission:  3/28/2020     Length of Stay:  LOS: 0 days       Principal Problem:  COVID-19 virus infection      Hospital Course:  Patient admitted from NH for of COVID-19. Has maintained sats ORA       This service was provided through telemedicine.  Visit type: Virtual visit with synchronous audio and video  Start time: 8:20  Chief complaint: SOB  The [facility] location is: [facility]  Present with the patient at the time of the telemedicine/virtual assessment:   End time: 8:26  Total time spent with patient: 6 min    Interval History:     3/31 No reported events overnight. Maintaining sats ORA. Pt still not very cooperative with exam, he shakes his head no to ROS questions. Per CM, [facility] is not accepting previous retirement patients. Will await updates  Review of Systems:  Patient shakes head "no" when asked about shortness of breath, chest pain, or cough.     Inpatient Medications:    Current Facility-Administered Medications:     0.9%  NaCl infusion, , Intravenous, PRN, Nayeli Orozco, FNP    0.9%  NaCl infusion, , Intravenous, Once, Nayeli Orozco, LLIIANP    acetaminophen tablet 650 mg, 650 mg, Oral, Q4H PRN, Charissa Abraham, PA-C, 650 mg at 03/30/20 2252    albuterol inhaler 2 puff, 2 puff, Inhalation, Q6H PRN **AND** MDI Q6H PRN, , , Q6H PRN, Ricky Morgan MD    atorvastatin tablet 40 mg, 40 mg, Per G Tube, QHS, Aleja Donovan PA-C, 40 mg at 03/30/20 2152    cefTRIAXone injection 1 g, 1 g, Intravenous, Q24H, Charissa Abraham PA-C, 1 g at 03/30/20 0830    dextromethorphan-guaifenesin  mg/5 ml liquid 10 mL, 10 mL, Oral, Q4H PRN, PRATIK Weir-C    dextrose 10% (D10W) Bolus, 12.5 g, Intravenous, PRN, Michael Potter MD    dextrose 10% (D10W) Bolus, 25 g, Intravenous, PRN, Michael Potter MD   " " doxycycline tablet 100 mg, 100 mg, Per G Tube, Q12H, PRATIK Archuleta-C, 100 mg at 03/30/20 2152    glucagon (human recombinant) injection 1 mg, 1 mg, Intramuscular, PRN, PRATIK Weir-[place]    glucose chewable tablet 16 g, 16 g, Oral, PRN, Charissa Abraham PA-C    glucose chewable tablet 24 g, 24 g, Oral, PRN, Charissa Abraham PA-C    hydrALAZINE tablet 25 mg, 25 mg, Oral, Q8H PRN, PRATIK Weir-C    levetiracetam oral soln Soln 250 mg, 250 mg, Per G Tube, BID, PRATIK Archuleta-[place], 250 mg at 03/30/20 2152    melatonin tablet 6 mg, 6 mg, Oral, Nightly PRN, PRATIK Weir-C, 6 mg at 03/29/20 2123    metoclopramide HCl tablet 10 mg, 10 mg, Per G Tube, TID AC, PRATIK Archuleta-[place], 10 mg at 03/30/20 1815    midodrine tablet 5 mg, 5 mg, Per G Tube, Every Mon, Wed, Fri, PRATIK Archuleta-[place], 5 mg at 03/30/20 1815    ondansetron disintegrating tablet 8 mg, 8 mg, Oral, Q8H PRN, Charissa Abraham PA-C    pantoprazole injection 40 mg, 40 mg, Intravenous, Daily, Aleja Donovan PA-C, 40 mg at 03/30/20 0829    promethazine (PHENERGAN) 6.25 mg in dextrose 5 % 50 mL IVPB, 6.25 mg, Intravenous, Q6H PRN, Charissa Abraham PA-C    senna tablet 2 tablet, 2 tablet, Per G Tube, Daily, Aleja Donovan PA-C, 2 tablet at 03/29/20 0913    sevelamer carbonate tablet 800 mg, 800 mg, Oral, BID WM, Rekha Denson PA-C, 800 mg at 03/30/20 1815    sodium chloride 0.9% flush 10 mL, 10 mL, Intravenous, PRN, Michael Potter MD      Physical Exam:    No intake or output data in the 24 hours ending 03/31/20 0825  Wt Readings from Last 3 Encounters:   03/28/20 54 kg (119 lb 0.8 oz)   03/25/20 54.4 kg (120 lb)   03/21/20 54.4 kg (120 lb)       /67 (Patient Position: Lying)   Pulse 88   Temp 96.8 °F (36 °C) (Axillary)   Resp 18   Ht 5' 6" (1.676 m)   Wt 54 kg (119 lb 0.8 oz)   SpO2 95%   BMI 19.21 kg/m²     GEN: NAD, not conversant  Resp: normal work of breathing   CV: no edema  MSK: L BKA  Neuro: awake, alert, will nod yes " and no to some questions; not participatory in exam or questioning    Laboratory:  Lab Results   Component Value Date    [id number] Detected (A) 03/25/2020       Recent Labs   Lab 03/25/20  1401 03/28/20 1226 03/28/20 2045 03/30/20  0009   WBC 4.39 5.53  --  5.35   LYMPH 22.6  1.0 21.3  1.2  --  17.4*  0.9*   HGB 10.2* 10.1* 9.5* 8.2*   HCT 32.4* 32.1* 32.0* 26.3*    223  --  249     Recent Labs   Lab 03/25/20  1401 03/28/20  1226 03/28/20 2045 03/30/20  0009   * 138 136 133*   K 3.3* 4.1  --  3.7   CL 96 96  --  94*   CO2 25 26  --  22*   BUN 38* 44*  --  67*   CREATININE 7.6* 6.7*  --  8.6*   * 91  --  136*   CALCIUM 7.7* 7.9*  --  7.8*   MG  --   --   --  2.0   PHOS  --   --   --  2.5*     Recent Labs   Lab 03/25/20  1401 03/28/20 1226 03/30/20  0009   ALKPHOS 103 95 81   ALT 7* 7* 9*   AST 28 37 36   ALBUMIN 2.5* 2.4* 2.2*   PROT 7.6 7.9 7.5   BILITOT 0.3 0.4 0.4        Recent Labs     03/28/20  1226 03/28/20  1344  03/29/20  1357 03/30/20  0009   DDIMER  --  0.98*  --   --   --    FERRITIN 1,762*  --   --   --   --    .1*  --   --   --  132.8*   LDH  --   --    < >  --  352*   BNP 1,089*  --   --   --   --    TROPONINI 1.344*  --   --  1.031*  --    CPK  --  202*  --   --   --     < > = values in this interval not displayed.       All labs within the last 24 hours were reviewed.     Microbiology:  Microbiology Results (last 7 days)     Procedure Component Value Units Date/Time    Blood culture [[id number]] Collected:  03/28/20 1345    Order Status:  Completed Specimen:  Blood from Peripheral, Antecubital, Left Updated:  03/30/20 1612     Blood Culture, Routine No Growth to date      No Growth to date    Blood culture [[id number]] Collected:  03/28/20 1407    Order Status:  Completed Specimen:  Blood from Peripheral, Forearm, Left Updated:  03/30/20 1612     Blood Culture, Routine No Growth to date      No Growth to date            Imaging  ECG Results          EKG 12-lead  (Final result)  Result time 03/29/20 23:09:47    Final result by Interface, Lab In [facility] (03/29/20 23:09:47)                 Narrative:    Test Reason : R06.02,    Vent. Rate : 103 BPM     Atrial Rate : 103 BPM     P-R Int : 146 ms          QRS Dur : 098 ms      QT Int : 382 ms       P-R-T Axes : 089 -49 066 degrees     QTc Int : 500 ms    Sinus tachycardia  Right ventricular conduction delay  Left anterior fascicular block  Voltage criteria for left ventricular hypertrophy  Nonspecific T wave abnormality  Abnormal ECG  When compared with ECG of 25-MAR-2020 13:54,  The axis Shifted left  Nonspecific T wave abnormality no longer evident in Inferior leads  Confirmed by Zeke Burns MD ([id number]) on 3/29/2020 11:09:42 PM    Referred By: [email or web address]   SELF           Confirmed By:Zeke Burns MD                              No results found for this or any previous visit.    X-Ray Chest AP Portable  Narrative: EXAMINATION:  XR CHEST AP PORTABLE    CLINICAL HISTORY:  Fever, COVID+;    TECHNIQUE:  Single frontal view of the chest was performed.    COMPARISON:  03/25/2020 and 03/21/2020    FINDINGS:  The patient is rotated on this exam limiting evaluation of the left lung.  Platelike airspace opacities are identified in the left upper and left lower lobes.  No focal airspace consolidation.  No pneumothorax or pleural effusion.  The cardiomediastinal silhouette is normal.    Incidental note is made of a right subclavian vascular stent.  The osseous and soft tissue structures are normal.  Impression: Findings compatible with platelike atelectasis in the left upper and lower lobes.  Overall no significant interval change.    Electronically signed by: Kassy Oro MD  Date:    03/28/2020  Time:    12:38      All imaging within the last 24 hours was reviewed.     Assessment and Plan:    Active Hospital Problems    Diagnosis  POA    *COVID-19 virus infection [J22, B97.29]  Yes    Fever [R50.9]  Yes    ESRD on dialysis  [N18.6, Z99.2]  Not Applicable    Seizure [R56.9]  Yes    Chronic blood loss anemia [D50.0]  Yes    Personal history of latent tuberculosis infection [Z86.15]  Yes    Severe malnutrition [E43]  Yes     Assessment and Plan  Severe Malnutrition in the context of Social/Environmental Circumstances     Related to (etiology):  Unknown etiology     Signs and Symptoms (as evidenced by):  Energy Intake: per pt, <75% intake over the last year  Body Fat Depletion: overall subcutaneous fat loss, moderate triceps fat loss and protruding patellas.  Muscle Mass Depletion:  moderate muscle wasting of interosseous, temporal, clavicle, calves and quadriceps  Weight Loss: 24% (39 lbs) x 1 year    Recommendations     Recommendation/Intervention: 1. Should pt be cleared for po diet, recommend renal diet with texture per SLP along with Novasource ONS TID. 2. Should pt require enteral feeding, initiate Novasource renal formula at 10ml/hr and advance 10ml Q4hrs to goal rate of 40ml/hr (provides 1920kcal, 87g pro, 691ml free water). Provide additional 500ml water flush/24 hrs. Hold for residuals >500ml.  Goals: 1. Pt to receive nutrition < 48 hrs.      History of GI bleed [Z87.19]  Not Applicable    Renovascular hypertension [I15.0]  Yes     Chronic    Pulmonary hypertension associated with end-stage renal disease (ESRD) on dialysis [I27.29, N18.6, Z99.2]  Not Applicable     Chronic    Chronic diastolic heart failure [I50.32]  Yes     Chronic     2-23-17    1 - Low normal to mildly depressed left ventricular systolic function (EF 50-55%).     2 - Right ventricular enlargement with mildly depressed systolic function.     3 - Left ventricular diastolic dysfunction.     4 - Right atrial enlargement.     5 - Severe tricuspid regurgitation.     6 - Pulmonary hypertension. The estimated PA systolic pressure is 86 mmHg.     7 - Increased central venous pressure.       Secondary hyperparathyroidism of renal origin [N25.81]  Yes       Resolved Hospital Problems   No resolved problems to display.       Suspected Covid-19 Virus Infection  Person Under Investigation (PUI) for COVID-19  - COVID-19 testing: Collection Date: 3/25/2020 Collection Time:   2:10 PM  - Infection Control notified    - Isolation:   - Airborne and Droplet Precautions  - Surgical mask on patient   - N95 masks must be fit tested, wear eye protection  - 20 second hand hygiene   - Limit visitors per hospital policy   - Consolidate lab draws, nursing care, and interventions    - Diagnostics: (Lymphopenia, hyponatremia, hyperferritinemia, elevated troponin, elevated d-dimer, age, and comorbidities are significant predictors of poor clinical outcome)     - CBC: Hgb 10.1                     trend Q48hrs  - CMP: BUN 44, Cr 6.7           trend Q48hrs  - Procalcitonin: 2.24  - D-dimer: 0.98                                    repeat prior to discharge  - Ferritin: 1762                         repeat prior to discharge   - CRP: 185.1    >132                       trend Q48hrs downtrending  - LDH:  ordered                        repeat prior to discharge  - BNP: ordered  - Troponin: 1.344                      - ECG: estrella acute ischemic changes              - rapid Flu: negative on 3/21/20              - RIP only if BMT/solid transplant:              - Legionella antigen: ordered              - Blood culture x2: NGTD              - Sputum culture: ordered               - CXR: platelike atelectasis in the left upper and lower lobes              - UA and culture: ordered              - CPK: 202    - Management:   Bundle care as able to maintain isolation & minimize in/out of room   - Supplemental O2 to maintain SpO2 92%-96%   if requiring 6L NC or higher, place on nonrebreather and discuss case with MICU   - Telemetry & continuous pulse oximetry    - If wheezing   - albuterol inhaler 2-4 puff Q6hr PRN    - ipratropium daily    - acetaminophen 650mg PO Q6hr PRN fever   - loperamide PRN for  viral diarrhea  - Empiric antibiotics per likely source & patient allergies    - CAP: x 5 day course (d/c early if low concern for bacterial co-infection)  Ceftriaxone 1g IV Q24hrs            Azithromycin 500mg IV day #1, then 250mg PO daily x4 days     If azithromycin is not available, start doxycycline                 If MRSA risk factors, add Vancomycin IV (PharmD consult)   - Investigational Treatment Protocol: (if patient meets criteria)   [email or web address]%20and%20Resources/[email or web address]_[email or web address]_Protocol.[email or web address]     - statin: atorvastatin 40mg po daily (if CPK WNL)    - start HCQ 400mg PO BID x1 day, then 400mg PO daily x 4 days   (check glucose 6 phosphate dehydrogenase (NOT G6PD Quant), ECG, and start Qshift POCT glucose)    Safety notes:   - Avoid NIPPV (CPAP/BiPAP) to prevent aerosolization, use on a case-by-case basis if in neg pressure room   - Cautious use of NSAIDS for fever per WHO recommendations (3/16/2020)   - No new ACEi/ARB start or discontinuation of chronic med unless hypotensive (Esler et al. Journal of Hypertension 2020, 38:000-000)   - Careful use of steroids in the absence of other indications (shock, ARDS)   - Fluid sparing resuscitation, avoid maintenance fluids     Advance Care Planning  Goals of care, counseling/discussion In light of the patients advanced and life limiting illness,I engaged the the patient's sister, Alicia Retana, in a conversation about the patient's preferences for care  at the very end of life. The patient wishes to have a natural, peaceful death.  Along those lines, the patient does not wish to have CPR or other invasive treatments performed when his heart and/or breathing stops. I communicated to Alicia Retana that a DNR form was completed and will be scanned into EPIC.  I spent a total of 20 minutes engaging the patient in this advance care planning discussion.    Elevated Troponin  Trop 1.3>1.0  EKG no acute  ischemic changes  Patient not complaining of CP  Suspect elevated in setting of COVID19 infection, ESRD     Renovascular Hypertension              Pulmonary Hypertension with ESRD on Dialysis   Continue regular dialysis MWF  Continue home hydralazine prn SBP >170  BP controlled on admission     History of GI bleed   Chronic blood loss anemia   Hgb 10.1 at baseline  Continue home Protonix     Seizures  Continue home Keppra      Severe malnutrition   Nursing home confirmed patient eats orally and receives supplemental Novasource through PEG   Will continue Novasource 50 cc/hr x 10 hrs    VTE High Risk Prophylaxis: enoxaparin 40mg sq QHS @ 2100 (bundled care) if GFR >30    Patient's chronic/stable medical conditions noted in the problem list above will be managed with the patient's home medications as tolerated.       Subsequent Inpatient Hospital CareLevel 3 [id number] Total visit time was 35 minutes or greater with greater than 50% of time spent in counseling and coordination of care.   Ray Brown M.D.

## 2020-03-31 NOTE — PROGRESS NOTES
Hemodialysis tx complete. Tx ended early by 55 minutes due to clotting in circuit. 1530 ml removed in a tx of 130 minutes. Blood able to be returned only on arterial side. 15g needles removed x2, gauze and tape applied, pressure held for 10 minutes, hemostasis achieved. Chirag RN charge and JAZ Orozco NP have been notified

## 2020-03-31 NOTE — PLAN OF CARE
Patient's VSS, stopped & completed Novasource feed 50ml/hr @0945. [id number], G tube flushed with 30cc of water and clamped/ GONZALO AAO patient doesn't want to speak with RN. Tele box monitored and maintained throughout shift. Patient refusing to take medications. Withdrawn and flat affect, GONZALO pain or discomfort. COVID protocols maintained throughout shift. Patient resting in bed.

## 2020-03-31 NOTE — PROGRESS NOTES
[facility]  Nephrology  Progress Note    Patient Name: Vaughn Retana  MRN: [id number]  Admission Date: 3/28/2020  Hospital Length of Stay: 0 days  Attending Provider: Ray Brown MD   Primary Care Physician: Cori Randall MD  Principal Problem:COVID-19 virus infection    Subjective:     HPI: 55 y/o male with PMHx ESRD on iHD (MWF), GI bleeds, + COVID-19 (3/25/20)        Interval History: Nephrology consulted for ESRD Management    Review of patient's allergies indicates:   Allergen Reactions    Fosrenol [lanthanum] Nausea And Vomiting     Nausea and vomiting     Current Facility-Administered Medications   Medication Frequency    0.9%  NaCl infusion PRN    0.9%  NaCl infusion Once    acetaminophen tablet 650 mg Q4H PRN    albuterol inhaler 2 puff Q6H PRN    atorvastatin tablet 40 mg QHS    cefTRIAXone injection 1 g Q24H    dextromethorphan-guaifenesin  mg/5 ml liquid 10 mL Q4H PRN    dextrose 10% (D10W) Bolus PRN    dextrose 10% (D10W) Bolus PRN    doxycycline tablet 100 mg Q12H    glucagon (human recombinant) injection 1 mg PRN    glucose chewable tablet 16 g PRN    glucose chewable tablet 24 g PRN    hydrALAZINE tablet 25 mg Q8H PRN    levetiracetam oral soln Soln 250 mg BID    melatonin tablet 6 mg Nightly PRN    metoclopramide HCl tablet 10 mg TID AC    midodrine tablet 5 mg Every Mon, Wed, Fri    ondansetron disintegrating tablet 8 mg Q8H PRN    pantoprazole injection 40 mg Daily    promethazine (PHENERGAN) 6.25 mg in dextrose 5 % 50 mL IVPB Q6H PRN    senna tablet 2 tablet Daily    sevelamer carbonate tablet 800 mg BID WM    sodium chloride 0.9% flush 10 mL PRN       Objective:     Vital Signs (Most Recent):  Temp: 96.8 °F (36 °C) (03/31/20 0749)  Pulse: 93 (03/31/20 1330)  Resp: 18 (03/31/20 1135)  BP: 127/79 (03/31/20 1330)  SpO2: 95 % (03/31/20 0817)  O2 Device (Oxygen Therapy): room air (03/31/20 1135) Vital Signs (24h Range):  Temp:  [96.8 °F (36  °C)-101.4 °F (38.6 °C)] 96.8 °F (36 °C)  Pulse:  [] 93  Resp:  [18-28] 18  SpO2:  [95 %-99 %] 95 %  BP: (111-168)/(67-89) 127/79     Weight: 54 kg (119 lb 0.8 oz) (03/28/20 2200)  Body mass index is 19.21 kg/m².  Body surface area is 1.59 meters squared.    I/O last 3 completed shifts:  In: 240 [P.O.:180; NG/GT:60]  Out: -     Physical Exam   Constitutional:   PE deferred to Primary Team d/t COVID-19 precautions       Significant Labs:  All labs within the past 24 hours have been reviewed.     Significant Imaging:  Labs: Reviewed    Assessment/Plan:     * COVID-19 virus infection  Managed by [facility]    ESRD on dialysis  -last HD Fri (3/27)  -s/p HD today  -clotted system approx 2-hr into 3-hr HD session with approx 1.5L removed  -TTS while IP, transition to F before d/c  -CBC, renal function panel with labs  -vit D level with next lab draw  -meds to renal parameters    -Hgb 8.2 3/30/20 (and has trended down from 10.2 3/25/20)  PMHx: GIB  Ferritin 1762 3/28/20  Will start epogen  Managed by [facility]    Corrected Ca+ 9.24  Ph- 2.5  -sevelamer carbonate to BID 3/30/20  On renal  -receiving Novasource with tube feeds    /79  -on midodrine    HD Careplan Reviewed:  [facility]  [facility]  3:45  AVF  DW 52.5kg    On cinacalcet 60 mg post-HD 3x/wk  -last dose 3/23/20  -recommend        Thank you for your consult. I will follow-up with patient. Please contact us if you have any additional questions.    Nayeli Orozco, LILIANP  Nephrology  [facility]

## 2020-03-31 NOTE — PLAN OF CARE
CM noted that that pt is a resident of [facility] and pt unable to return until Covid negative. CM placed [facility] Covid unit referral (care home bed). RUSTY also spoke w/ Jose Manuel from [facility] [phone] to update of Covid + status and she states that he will need to transfer temporarily transfer to [facility] unit and isolation days are TTS. Jose Manuel will notify [facility] of pt - please call Jose Manuel w/ pt's JUAN 24 hrs in advance and she will obtain chair time.     03/31/20 1330   Discharge Reassessment   Assessment Type Discharge Planning Reassessment   Discharge Plan A New Nursing Home placement - care home care facility   Discharge Plan B Return to Nursing Home   Anticipated Discharge Disposition [facility]   Post-Acute Status   Post-Acute Authorization Placement;Dialysis   Post-Acute Placement Status Referrals Sent   Diaylsis Status Set-up Complete  (pt temp changed to [facility] TTS)

## 2020-04-01 NOTE — PLAN OF CARE
-Pt free from fall or injury so far this shift. Instructed to call if assistance needed.   -Cardiac monitoring in progress, currently ST.  -Sats high 90's on RA.   -Plan for HD tomorrow.   -Afebrile. T-max 99.8.   -Covid isolation precautions maintained throughout the shift.

## 2020-04-01 NOTE — PROGRESS NOTES
"Hospital Medicine  Progress Note  [facility]      Patient Name: Vaughn Retana  MRN:  [id number]  Hospital Medicine Team: [facility] D Ray Brown MD  Date of Admission:  3/28/2020     Length of Stay:  LOS: 0 days       Principal Problem:  COVID-19 virus infection      Hospital Course:  Patient admitted from NH for of COVID-19. Has maintained sats ORA       This service was provided through telemedicine.  Visit type: Virtual visit with synchronous audio and video  Start time: 7:50  aint: SOB  The patient location is: [address] [place] [address] A  Present with the patient at the time of the telemedicine/virtual assessment:   End time: 7:55  Total time spent with patient: 6 min    Interval History:     4/01 [place] for DC to SNF No reported events overnight. Maintaining sats ORA. Pt still not very cooperative with exam, he shakes his head no to ROS questions. Per CM, [facility] is not accepting previous care home patients. Will await updates  Review of Systems:  Patient shakes head "no" when asked about shortness of breath, chest pain, or cough.     Inpatient Medications:    Current Facility-Administered Medications:     0.9%  NaCl infusion, , Intravenous, PRN, Nayeli Orozco, FNP, Last Rate: 100 mL/hr at 03/31/20 1135    0.9%  NaCl infusion, , Intravenous, Once, Nayeli Orozco FNP    [START ON 4/2/2020] 0.9%  NaCl infusion, , Intravenous, PRN, Nayeli Orozco FNP    [START ON 4/2/2020] 0.9%  NaCl infusion, , Intravenous, Once, Nayeli Orozco FNP    acetaminophen tablet 650 mg, 650 mg, Oral, Q4H PRN, Charissa Abraham PA-C, 650 mg at 03/31/20 1656    albuterol inhaler 2 puff, 2 puff, Inhalation, Q6H PRN **AND** MDI Q6H PRN, , , Q6H PRN, Ricky Morgan MD    atorvastatin tablet 40 mg, 40 mg, Per G Tube, QHS, Aleja Donovan PA-C, 40 mg at 03/31/20 2256    cefTRIAXone injection 1 g, 1 g, Intravenous, Q24H, Charissa Abraham PA-C, 1 g at 03/31/20 0930    cinacalcet tablet 60 mg, " 60 mg, Oral, Once, Nayeli Orozco, FNP    dextromethorphan-guaifenesin  mg/5 ml liquid 10 mL, 10 mL, Oral, Q4H PRN, Charissa Abraham PA-C    dextrose 10% (D10W) Bolus, 12.5 g, Intravenous, PRN, Michael Potter MD    dextrose 10% (D10W) Bolus, 25 g, Intravenous, PRN, Michael Potter MD    doxycycline tablet 100 mg, 100 mg, Per G Tube, Q12H, PRATIK Archuleta-C, 100 mg at 03/31/20 2255    [START ON 4/2/2020] epoetin la nena-epbx injection 2,700 Units, 50 Units/kg, Intravenous, Every Tues, Thurs, Sat, Nayeli Orozco, LILIANP    glucagon (human recombinant) injection 1 mg, 1 mg, Intramuscular, PRN, PRATIK Weir-C    glucose chewable tablet 16 g, 16 g, Oral, PRN, PRATIK Weir-C    glucose chewable tablet 24 g, 24 g, Oral, PRN, Charissa Abraham PA-C    hydrALAZINE tablet 25 mg, 25 mg, Oral, Q8H PRN, PRATIK Weir-C    levetiracetam oral soln Soln 250 mg, 250 mg, Per G Tube, BID, AUBREY ArchuletaC, 250 mg at 03/31/20 2256    melatonin tablet 6 mg, 6 mg, Oral, Nightly PRN, PRATIK Weir-C, 6 mg at 03/31/20 2255    metoclopramide HCl tablet 10 mg, 10 mg, Per G Tube, TID AC, PRATIK Archuleta-C, 10 mg at 04/01/20 0504    midodrine tablet 5 mg, 5 mg, Per G Tube, Every Mon, Wed, Fri, PRATIK Archuleta-[place], 5 mg at 03/30/20 1815    ondansetron disintegrating tablet 8 mg, 8 mg, Oral, Q8H PRN, PRATIK Weir-[place]    pantoprazole injection 40 mg, 40 mg, Intravenous, Daily, Aleja Donovan PA-C, 40 mg at 03/31/20 0937    promethazine (PHENERGAN) 6.25 mg in dextrose 5 % 50 mL IVPB, 6.25 mg, Intravenous, Q6H PRN, Charissa Abraham PA-C    senna tablet 2 tablet, 2 tablet, Per G Tube, Daily, Aleja Donovan PA-C, 2 tablet at 03/31/20 0939    sevelamer carbonate tablet 800 mg, 800 mg, Oral, BID WM, Rekha Denson PA-C, 800 mg at 03/31/20 1659    sodium chloride 0.9% flush 10 mL, 10 mL, Intravenous, PRN, Michael Potter MD      Physical Exam:      Intake/Output Summary (Last 24 hours) at  "4/1/2020 0801  Last data filed at 4/1/2020 0500  Gross per 24 hour   Intake 770 ml   Output 1830 ml   Net -1060 ml     Wt Readings from Last 3 Encounters:   03/28/20 54 kg (119 lb 0.8 oz)   03/25/20 54.4 kg (120 lb)   03/21/20 54.4 kg (120 lb)       /63 (BP Location: Left arm, Patient Position: Lying)   Pulse 100   Temp 98 °F (36.7 °C) (Oral)   Resp 20   Ht 5' 6" (1.676 m)   Wt 54 kg (119 lb 0.8 oz)   SpO2 96%   BMI 19.21 kg/m²     GEN: NAD, not conversant  Resp: normal work of breathing   CV: no edema  MSK: L BKA  Neuro: awake, alert, will nod yes and no to some questions; not participatory in exam or questioning    Laboratory:  Lab Results   Component Value Date    [id number] Detected (A) 03/25/2020       Recent Labs   Lab 03/28/20 1226 03/28/20 2045 03/30/20 0009 04/01/20  0505   WBC 5.53  --  5.35 5.05   LYMPH 21.3  1.2  --  17.4*  0.9* 15.8*  0.8*   HGB 10.1* 9.5* 8.2* 8.6*   HCT 32.1* 32.0* 26.3* 27.6*     --  249 298     Recent Labs   Lab 03/28/20 1226 03/28/20 2045 03/30/20 0009 04/01/20  0505    136 133* 136   K 4.1  --  3.7 3.6   CL 96  --  94* 99   CO2 26  --  22* 23   BUN 44*  --  67* 49*   CREATININE 6.7*  --  8.6* 7.4*   GLU 91  --  136* 162*   CALCIUM 7.9*  --  7.8* 8.5*   MG  --   --  2.0  --    PHOS  --   --  2.5*  --      Recent Labs   Lab 03/28/20 1226 03/30/20 0009 04/01/20  0505   ALKPHOS 95 81 80   ALT 7* 9* 8*   AST 37 36 29   ALBUMIN 2.4* 2.2* 2.0*   PROT 7.9 7.5 7.8   BILITOT 0.4 0.4 0.4        Recent Labs     03/29/20  1357 03/30/20  0009 03/31/20  2359   CRP  --  132.8* 165.4*   LDH  --  352*  --    TROPONINI 1.031*  --   --        All labs within the last 24 hours were reviewed.     Microbiology:  Microbiology Results (last 7 days)     Procedure Component Value Units Date/Time    Blood culture [[id number]] Collected:  03/28/20 1407    Order Status:  Completed Specimen:  Blood from Peripheral, Forearm, Left Updated:  03/31/20 1612     Blood Culture, " Routine No Growth to date      No Growth to date      No Growth to date    Blood culture [[id number]] Collected:  03/28/20 1345    Order Status:  Completed Specimen:  Blood from Peripheral, Antecubital, Left Updated:  03/31/20 1612     Blood Culture, Routine No Growth to date      No Growth to date      No Growth to date            Imaging  ECG Results          EKG 12-lead (Final result)  Result time 03/29/20 23:09:47    Final result by Interface, Lab In [facility] (03/29/20 23:09:47)                 Narrative:    Test Reason : R06.02,    Vent. Rate : 103 BPM     Atrial Rate : 103 BPM     P-R Int : 146 ms          QRS Dur : 098 ms      QT Int : 382 ms       P-R-T Axes : 089 -49 066 degrees     QTc Int : 500 ms    Sinus tachycardia  Right ventricular conduction delay  Left anterior fascicular block  Voltage criteria for left ventricular hypertrophy  Nonspecific T wave abnormality  Abnormal ECG  When compared with ECG of 25-MAR-2020 13:54,  The axis Shifted left  Nonspecific T wave abnormality no longer evident in Inferior leads  Confirmed by Zeke Burns MD ([id number]) on 3/29/2020 11:09:42 PM    Referred By: [email or web address]   SELF           Confirmed By:Zeke Burns MD                              No results found for this or any previous visit.    X-Ray Chest AP Portable  Narrative: EXAMINATION:  XR CHEST AP PORTABLE    CLINICAL HISTORY:  Fever, COVID+;    TECHNIQUE:  Single frontal view of the chest was performed.    COMPARISON:  03/25/2020 and 03/21/2020    FINDINGS:  The patient is rotated on this exam limiting evaluation of the left lung.  Platelike airspace opacities are identified in the left upper and left lower lobes.  No focal airspace consolidation.  No pneumothorax or pleural effusion.  The cardiomediastinal silhouette is normal.    Incidental note is made of a right subclavian vascular stent.  The osseous and soft tissue structures are normal.  Impression: Findings compatible with platelike atelectasis in the  left upper and lower lobes.  Overall no significant interval change.    Electronically signed by: Kassy Oro MD  Date:    03/28/2020  Time:    12:38      All imaging within the last 24 hours was reviewed.     Assessment and Plan:    Active Hospital Problems    Diagnosis  POA    *COVID-19 virus infection [U07.1]  Yes    Fever [R50.9]  Yes    ESRD on dialysis [N18.6, Z99.2]  Not Applicable    Seizure [R56.9]  Yes    Chronic blood loss anemia [D50.0]  Yes    Personal history of latent tuberculosis infection [Z86.15]  Yes    Severe malnutrition [E43]  Yes     Assessment and Plan  Severe Malnutrition in the context of Social/Environmental Circumstances     Related to (etiology):  Unknown etiology     Signs and Symptoms (as evidenced by):  Energy Intake: per pt, <75% intake over the last year  Body Fat Depletion: overall subcutaneous fat loss, moderate triceps fat loss and protruding patellas.  Muscle Mass Depletion:  moderate muscle wasting of interosseous, temporal, clavicle, calves and quadriceps  Weight Loss: 24% (39 lbs) x 1 year    Recommendations     Recommendation/Intervention: 1. Should pt be cleared for po diet, recommend renal diet with texture per SLP along with Novasource ONS TID. 2. Should pt require enteral feeding, initiate Novasource renal formula at 10ml/hr and advance 10ml Q4hrs to goal rate of 40ml/hr (provides 1920kcal, 87g pro, 691ml free water). Provide additional 500ml water flush/24 hrs. Hold for residuals >500ml.  Goals: 1. Pt to receive nutrition < 48 hrs.      History of GI bleed [Z87.19]  Not Applicable    Renovascular hypertension [I15.0]  Yes     Chronic    Pulmonary hypertension associated with end-stage renal disease (ESRD) on dialysis [I27.29, N18.6, Z99.2]  Not Applicable     Chronic    Chronic diastolic heart failure [I50.32]  Yes     Chronic     2-23-17    1 - Low normal to mildly depressed left ventricular systolic function (EF 50-55%).     2 - Right ventricular  enlargement with mildly depressed systolic function.     3 - Left ventricular diastolic dysfunction.     4 - Right atrial enlargement.     5 - Severe tricuspid regurgitation.     6 - Pulmonary hypertension. The estimated PA systolic pressure is 86 mmHg.     7 - Increased central venous pressure.       Secondary hyperparathyroidism of renal origin [N25.81]  Yes      Resolved Hospital Problems   No resolved problems to display.       Suspected Covid-19 Virus Infection  Person Under Investigation (PUI) for COVID-19  - COVID-19 testing: Collection Date: 3/25/2020 Collection Time:   2:10 PM  - Infection Control notified    - Isolation:   - Airborne and Droplet Precautions  - Surgical mask on patient   - N95 masks must be fit tested, wear eye protection  - 20 second hand hygiene   - Limit visitors per hospital policy   - Consolidate lab draws, nursing care, and interventions    - Diagnostics: (Lymphopenia, hyponatremia, hyperferritinemia, elevated troponin, elevated d-dimer, age, and comorbidities are significant predictors of poor clinical outcome)     - CBC: Hgb 10.1                     trend Q48hrs  - CMP: BUN 44, Cr 6.7           trend Q48hrs  - Procalcitonin: 2.24  - D-dimer: 0.98                                    repeat prior to discharge  - Ferritin: 1762                         repeat prior to discharge   - CRP: 185.1    >132                       trend Q48hrs downtrending  - LDH:  ordered                        repeat prior to discharge  - BNP: ordered  - Troponin: 1.344                      - ECG: estrella acute ischemic changes              - rapid Flu: negative on 3/21/20              - RIP only if BMT/solid transplant:              - Legionella antigen: ordered              - Blood culture x2: NGTD              - Sputum culture: ordered               - CXR: platelike atelectasis in the left upper and lower lobes              - UA and culture: ordered              - CPK: 202    - Management:   Bundle care as able  to maintain isolation & minimize in/out of room   - Supplemental O2 to maintain SpO2 92%-96%   if requiring 6L NC or higher, place on nonrebreather and discuss case with MICU   - Telemetry & continuous pulse oximetry    - If wheezing   - albuterol inhaler 2-4 puff Q6hr PRN    - ipratropium daily    - acetaminophen 650mg PO Q6hr PRN fever   - loperamide PRN for viral diarrhea  - Empiric antibiotics per likely source & patient allergies    - CAP: x 5 day course (d/c early if low concern for bacterial co-infection)  Ceftriaxone 1g IV Q24hrs            Azithromycin 500mg IV day #1, then 250mg PO daily x4 days     If azithromycin is not available, start doxycycline                 If MRSA risk factors, add Vancomycin IV (PharmD consult)   - Investigational Treatment Protocol: (if patient meets criteria)   [email or web address]     - statin: atorvastatin 40mg po daily (if CPK WNL)    - start HCQ 400mg PO BID x1 day, then 400mg PO daily x 4 days   (check glucose 6 phosphate dehydrogenase (NOT G6PD Quant), ECG, and start Qshift POCT glucose)    Safety notes:   - Avoid NIPPV (CPAP/BiPAP) to prevent aerosolization, use on a case-by-case basis if in neg pressure room   - Cautious use of NSAIDS for fever per WHO recommendations (3/16/2020)   - No new ACEi/ARB start or discontinuation of chronic med unless hypotensive (Esler et al. Journal of Hypertension 2020, 38:000-000)   - Careful use of steroids in the absence of other indications (shock, ARDS)   - Fluid sparing resuscitation, avoid maintenance fluids     Advance Care Planning  Goals of care, counseling/discussion In light of the patients advanced and life limiting illness,I engaged the the patient's sister, Alicia Retana, in a conversation about the patient's preferences for care  at the very end of life. The patient wishes to have a natural, peaceful death.  Along those  lines, the patient does not wish to have CPR or other invasive treatments performed when his heart and/or breathing stops. I communicated to Alicia Mazin that a DNR form was completed and will be scanned into EPIC.  I spent a total of 20 minutes engaging the patient in this advance care planning discussion.    Elevated Troponin  Trop 1.3>1.0  EKG no acute ischemic changes  Patient not complaining of CP  Suspect elevated in setting of COVID19 infection, ESRD     Renovascular Hypertension              Pulmonary Hypertension with ESRD on Dialysis   Continue regular dialysis MWF  Continue home hydralazine prn SBP >170  BP controlled on admission     History of GI bleed   Chronic blood loss anemia   Hgb 10.1 at baseline  Continue home Protonix     Seizures  Continue home Keppra      Severe malnutrition   Nursing home confirmed patient eats orally and receives supplemental Novasource through PEG   Will continue Novasource 50 cc/hr x 10 hrs    VTE High Risk Prophylaxis: enoxaparin 40mg sq QHS @ 2100 (bundled care) if GFR >30    Patient's chronic/stable medical conditions noted in the problem list above will be managed with the patient's home medications as tolerated.       [facility] [id number] Total visit time was 35 minutes or greater with greater than 50% of time spent in counseling and coordination of care.   Ray Brown M.D.

## 2020-04-01 NOTE — PLAN OF CARE
AOx0 - pt nonverbal. VSS, denies pain. Remains on TF @ 50cc/hr. TF for 10hr/day, d/t come down @ 0915. Able to reposition himself in bed w/o assistance. Pt is incontinent however, outstanding for BM as well as being anuric. Pt remains free from falls. Bed locked and lowered. Bed alarm maintained. Personal items & call light w/in reach. Frequency checks completed for safety. Fall risk reviewed with pt. Pt verbalized understanding. NAD, WCTM.

## 2020-04-02 NOTE — PROGRESS NOTES
Patient completed 170 minutes of 3 hour treatment. 1400 ml removed. paitient clotted once approximately 1:40 minutes into treatment and was restarted. Needles pulled, pressure held X 8 minutes. Hemostasis achieved, vs stable. Pressure dressings applied. + thrilll and bruit noted. Patient continues to not answer questions; has hiccups.

## 2020-04-02 NOTE — PLAN OF CARE
SW provided update on placement difficulties to pts sisterAlicia. Explained why pt cannot return to [facility] at this time and explained that NATTY/RUSTY is working each day to determine if there are other options for alternative placements for NH residents that have tested positive. [facility] but [facility] has not been a determination yet on acceptance or denial.    Sister stated she had discussed with other family about taking pt in once ready for dc, however, no one is able to provide care for pt in their home.     NATTY/RUSTY will continue to follow/work on d/c plan.     Nirmala Post, OSBALDOW [phone]

## 2020-04-02 NOTE — PROGRESS NOTES
Patient arrived via bed; will not answer questions. Patient alert; dialyzes MWF as outpatient. (1537) Needles inserted to right upper arm fistula without difficulty. Lines secured. HD initiated as ordered by MD. Monitoring.

## 2020-04-02 NOTE — PLAN OF CARE
Pt in bed resting. Respirations even and non-labored. No acute distress noted. Bed alarm on. Will continue to monitor.

## 2020-04-02 NOTE — PROGRESS NOTES
"Hospital Medicine  Progress Note  [facility]      Patient Name: Vaughn Retana  MRN:  [id number]  Hospital Medicine Team: Networked reference to record PCT  Ray Brown MD  Date of Admission:  3/28/2020     Length of Stay:  LOS: 0 days       Principal Problem:  COVID-19 virus infection      Hospital Course:  Patient admitted from NH for of COVID-19. Has maintained sats ORA       This service was provided through telemedicine.  Visit type: Virtual visit with synchronous audio and video  Start time: 9:50  aint: SOB  The patient location is: [address] [place] [address] A  Present with the patient at the time of the telemedicine/virtual assessment:   End time: 9:55  Total time spent with patient: 5 min    Interval History:     4/02 [place] for DC to SNF No reported events overnight. Maintaining sats ORA. Pt still not very cooperative with exam, he shakes his head no to ROS questions. Per CM, [facility] is not accepting previous [facility] patients. Will await updates  Review of Systems:  Patient shakes head "no" when asked about shortness of breath, chest pain, or cough.     Inpatient Medications:    Current Facility-Administered Medications:     0.9%  NaCl infusion, , Intravenous, PRN, Nayeli Orozco, FNP, Last Rate: 100 mL/hr at 03/31/20 1135    0.9%  NaCl infusion, , Intravenous, Once, Nayeli Orozco, FNP    0.9%  NaCl infusion, , Intravenous, PRN, Nayeli Orozco, FNP    0.9%  NaCl infusion, , Intravenous, Once, Nayeli Orozco, FNP    acetaminophen tablet 650 mg, 650 mg, Oral, Q4H PRN, Charissa Abraham PA-C, 650 mg at 03/31/20 1656    albuterol inhaler 2 puff, 2 puff, Inhalation, Q6H PRN **AND** MDI Q6H PRN, , , Q6H PRN, Ricky Morgan MD    atorvastatin tablet 40 mg, 40 mg, Per G Tube, QHS, Aleja Donovan PA-C, 40 mg at 04/01/20 2221    cinacalcet tablet 60 mg, 60 mg, Oral, Once, Nayeli Orozco, FNP    dextromethorphan-guaifenesin  mg/5 ml liquid 10 mL, 10 mL, " Oral, Q4H PRN, PRATIK Weir-C    dextrose 10% (D10W) Bolus, 12.5 g, Intravenous, PRN, Michael Potter MD    dextrose 10% (D10W) Bolus, 25 g, Intravenous, PRN, Michael Potter MD    doxycycline tablet 100 mg, 100 mg, Per G Tube, Q12H, Aleja Donovan PA-C, 100 mg at 04/01/20 2221    epoetin la nena-epbx injection 2,700 Units, 50 Units/kg, Intravenous, Every Tues, Thurs, Sat, Nayeli Orozco, FNP    glucagon (human recombinant) injection 1 mg, 1 mg, Intramuscular, PRN, PRATIK Weir-C    glucose chewable tablet 16 g, 16 g, Oral, PRN, Charissa Abraham PA-C    glucose chewable tablet 24 g, 24 g, Oral, PRN, Charissa Abraham PA-C    hydrALAZINE tablet 25 mg, 25 mg, Oral, Q8H PRN, Charissa Abraham PA-C    levetiracetam oral soln Soln 250 mg, 250 mg, Per G Tube, BID, Aleja Donovan PA-C, 250 mg at 04/02/20 0846    melatonin tablet 6 mg, 6 mg, Oral, Nightly PRN, Charissa Abraham PA-C, 6 mg at 03/31/20 2255    metoclopramide HCl tablet 10 mg, 10 mg, Per G Tube, TID AC, Aleja Donovan PA-C, 10 mg at 04/02/20 0503    midodrine tablet 5 mg, 5 mg, Per G Tube, Every Mon, Wed, Fri, Aleja Donovan PA-C, 5 mg at 04/01/20 1700    ondansetron disintegrating tablet 8 mg, 8 mg, Oral, Q8H PRN, Charissa Abraham PA-C    pantoprazole injection 40 mg, 40 mg, Intravenous, Daily, Aleja Donovan PA-C, 40 mg at 04/02/20 0849    promethazine (PHENERGAN) 6.25 mg in dextrose 5 % 50 mL IVPB, 6.25 mg, Intravenous, Q6H PRN, Charissa Abraham PA-C    senna tablet 2 tablet, 2 tablet, Per G Tube, Daily, Aleja Donovan PA-C, 2 tablet at 04/02/20 0845    sevelamer carbonate tablet 800 mg, 800 mg, Oral, BID WM, Rekha Denson PA-C, 800 mg at 04/02/20 0846    sodium chloride 0.9% flush 10 mL, 10 mL, Intravenous, PRN, Michael Potter MD      Physical Exam:      Intake/Output Summary (Last 24 hours) at 4/2/2020 1002  Last data filed at 4/1/2020 1800  Gross per 24 hour   Intake 40 ml   Output 0 ml   Net 40 ml     Wt Readings from Last 3  "Encounters:   03/28/20 54 kg (119 lb 0.8 oz)   03/25/20 54.4 kg (120 lb)   03/21/20 54.4 kg (120 lb)       /64 (Patient Position: Lying)   Pulse 101   Temp 96.1 °F (35.6 °C) (Axillary)   Resp 16   Ht 5' 6" (1.676 m)   Wt 54 kg (119 lb 0.8 oz)   SpO2 98%   BMI 19.21 kg/m²     GEN: NAD, not conversant  Resp: normal work of breathing   CV: no edema  MSK: L BKA  Neuro: awake, alert, will nod yes and no to some questions; not participatory in exam or questioning    Laboratory:  Lab Results   Component Value Date    [id number] Detected (A) 03/25/2020       Recent Labs   Lab 03/28/20 1226 03/28/20 2045 03/30/20 0009 04/01/20  0505   WBC 5.53  --  5.35 5.05   LYMPH 21.3  1.2  --  17.4*  0.9* 15.8*  0.8*   HGB 10.1* 9.5* 8.2* 8.6*   HCT 32.1* 32.0* 26.3* 27.6*     --  249 298     Recent Labs   Lab 03/28/20 1226 03/28/20 2045 03/30/20 0009 04/01/20  0505    136 133* 136   K 4.1  --  3.7 3.6   CL 96  --  94* 99   CO2 26  --  22* 23   BUN 44*  --  67* 49*   CREATININE 6.7*  --  8.6* 7.4*   GLU 91  --  136* 162*   CALCIUM 7.9*  --  7.8* 8.5*   MG  --   --  2.0  --    PHOS  --   --  2.5*  --      Recent Labs   Lab 03/28/20 1226 03/30/20 0009 04/01/20  0505   ALKPHOS 95 81 80   ALT 7* 9* 8*   AST 37 36 29   ALBUMIN 2.4* 2.2* 2.0*   PROT 7.9 7.5 7.8   BILITOT 0.4 0.4 0.4        Recent Labs     03/31/20  2359   .4*       All labs within the last 24 hours were reviewed.     Microbiology:  Microbiology Results (last 7 days)     Procedure Component Value Units Date/Time    Blood culture [[id number]] Collected:  03/28/20 1407    Order Status:  Completed Specimen:  Blood from Peripheral, Forearm, Left Updated:  04/01/20 1612     Blood Culture, Routine No Growth after 4 days.     Blood culture [[id number]] Collected:  03/28/20 1345    Order Status:  Completed Specimen:  Blood from Peripheral, Antecubital, Left Updated:  04/01/20 1612     Blood Culture, Routine No Growth after 4 days.     "         Imaging  ECG Results          EKG 12-lead (Final result)  Result time 03/29/20 23:09:47    Final result by Interface, Lab In [facility] (03/29/20 23:09:47)                 Narrative:    Test Reason : R06.02,    Vent. Rate : 103 BPM     Atrial Rate : 103 BPM     P-R Int : 146 ms          QRS Dur : 098 ms      QT Int : 382 ms       P-R-T Axes : 089 -49 066 degrees     QTc Int : 500 ms    Sinus tachycardia  Right ventricular conduction delay  Left anterior fascicular block  Voltage criteria for left ventricular hypertrophy  Nonspecific T wave abnormality  Abnormal ECG  When compared with ECG of 25-MAR-2020 13:54,  The axis Shifted left  Nonspecific T wave abnormality no longer evident in Inferior leads  Confirmed by Zeke Burns MD ([id number]) on 3/29/2020 11:09:42 PM    Referred By: KHARI   SELF           Confirmed By:Zeke Burns MD                              No results found for this or any previous visit.    X-Ray Chest AP Portable  Narrative: EXAMINATION:  XR CHEST AP PORTABLE    CLINICAL HISTORY:  Fever, COVID+;    TECHNIQUE:  Single frontal view of the chest was performed.    COMPARISON:  03/25/2020 and 03/21/2020    FINDINGS:  The patient is rotated on this exam limiting evaluation of the left lung.  Platelike airspace opacities are identified in the left upper and left lower lobes.  No focal airspace consolidation.  No pneumothorax or pleural effusion.  The cardiomediastinal silhouette is normal.    Incidental note is made of a right subclavian vascular stent.  The osseous and soft tissue structures are normal.  Impression: Findings compatible with platelike atelectasis in the left upper and lower lobes.  Overall no significant interval change.    Electronically signed by: Kassy Oro MD  Date:    03/28/2020  Time:    12:38      All imaging within the last 24 hours was reviewed.     Assessment and Plan:    Active Hospital Problems    Diagnosis  POA    *COVID-19 virus infection [U07.1]  Yes     Fever [R50.9]  Yes    ESRD on dialysis [N18.6, Z99.2]  Not Applicable    Seizure [R56.9]  Yes    Chronic blood loss anemia [D50.0]  Yes    Personal history of latent tuberculosis infection [Z86.15]  Yes    Severe malnutrition [E43]  Yes     Assessment and Plan  Severe Malnutrition in the context of Social/Environmental Circumstances     Related to (etiology):  Unknown etiology     Signs and Symptoms (as evidenced by):  Energy Intake: per pt, <75% intake over the last year  Body Fat Depletion: overall subcutaneous fat loss, moderate triceps fat loss and protruding patellas.  Muscle Mass Depletion:  moderate muscle wasting of interosseous, temporal, clavicle, calves and quadriceps  Weight Loss: 24% (39 lbs) x 1 year    Recommendations     Recommendation/Intervention: 1. Should pt be cleared for po diet, recommend renal diet with texture per SLP along with Novasource ONS TID. 2. Should pt require enteral feeding, initiate Novasource renal formula at 10ml/hr and advance 10ml Q4hrs to goal rate of 40ml/hr (provides 1920kcal, 87g pro, 691ml free water). Provide additional 500ml water flush/24 hrs. Hold for residuals >500ml.  Goals: 1. Pt to receive nutrition < 48 hrs.      History of GI bleed [Z87.19]  Not Applicable    Renovascular hypertension [I15.0]  Yes     Chronic    Pulmonary hypertension associated with end-stage renal disease (ESRD) on dialysis [I27.29, N18.6, Z99.2]  Not Applicable     Chronic    Chronic diastolic heart failure [I50.32]  Yes     Chronic     2-23-17    1 - Low normal to mildly depressed left ventricular systolic function (EF 50-55%).     2 - Right ventricular enlargement with mildly depressed systolic function.     3 - Left ventricular diastolic dysfunction.     4 - Right atrial enlargement.     5 - Severe tricuspid regurgitation.     6 - Pulmonary hypertension. The estimated PA systolic pressure is 86 mmHg.     7 - Increased central venous pressure.       Secondary  hyperparathyroidism of renal origin [N25.81]  Yes      Resolved Hospital Problems   No resolved problems to display.       Suspected Covid-19 Virus Infection  Person Under Investigation (PUI) for COVID-19  - COVID-19 testing: Collection Date: 3/25/2020 Collection Time:   2:10 PM  - Infection Control notified    - Isolation:   - Airborne and Droplet Precautions  - Surgical mask on patient   - N95 masks must be fit tested, wear eye protection  - 20 second hand hygiene   - Limit visitors per hospital policy   - Consolidate lab draws, nursing care, and interventions    - Diagnostics: (Lymphopenia, hyponatremia, hyperferritinemia, elevated troponin, elevated d-dimer, age, and comorbidities are significant predictors of poor clinical outcome)     - CBC: Hgb 10.1                     trend Q48hrs  - CMP: BUN 44, Cr 6.7           trend Q48hrs  - Procalcitonin: 2.24  - D-dimer: 0.98                                    repeat prior to discharge  - Ferritin: 1762                         repeat prior to discharge   - CRP: 185.1    >132                       trend Q48hrs downtrending  - LDH:  ordered                        repeat prior to discharge  - BNP: ordered  - Troponin: 1.344                      - ECG: estrella acute ischemic changes              - rapid Flu: negative on 3/21/20              - RIP only if BMT/solid transplant:              - Legionella antigen: ordered              - Blood culture x2: NGTD              - Sputum culture: ordered               - CXR: platelike atelectasis in the left upper and lower lobes              - UA and culture: ordered              - CPK: 202    - Management:   Bundle care as able to maintain isolation & minimize in/out of room   - Supplemental O2 to maintain SpO2 92%-96%   if requiring 6L NC or higher, place on nonrebreather and discuss case with MICU   - Telemetry & continuous pulse oximetry    - If wheezing   - albuterol inhaler 2-4 puff Q6hr PRN    - ipratropium daily    -  acetaminophen 650mg PO Q6hr PRN fever   - loperamide PRN for viral diarrhea  - Empiric antibiotics per likely source & patient allergies    - CAP: x 5 day course (d/c early if low concern for bacterial co-infection)  Ceftriaxone 1g IV Q24hrs            Azithromycin 500mg IV day #1, then 250mg PO daily x4 days     If azithromycin is not available, start doxycycline                 If MRSA risk factors, add Vancomycin IV (PharmD consult)   - Investigational Treatment Protocol: (if patient meets criteria)   [email or web address]     - statin: atorvastatin 40mg po daily (if CPK WNL)    - start HCQ 400mg PO BID x1 day, then 400mg PO daily x 4 days   (check glucose 6 phosphate dehydrogenase (NOT G6PD Quant), ECG, and start Qshift POCT glucose)    Safety notes:   - Avoid NIPPV (CPAP/BiPAP) to prevent aerosolization, use on a case-by-case basis if in neg pressure room   - Cautious use of NSAIDS for fever per WHO recommendations (3/16/2020)   - No new ACEi/ARB start or discontinuation of chronic med unless hypotensive (Esler et al. Journal of Hypertension 2020, 38:000-000)   - Careful use of steroids in the absence of other indications (shock, ARDS)   - Fluid sparing resuscitation, avoid maintenance fluids     Advance Care Planning  Goals of care, counseling/discussion In light of the patients advanced and life limiting illness,I engaged the the patient's sister, Alicia Retana, in a conversation about the patient's preferences for care  at the very end of life. The patient wishes to have a natural, peaceful death.  Along those lines, the patient does not wish to have CPR or other invasive treatments performed when his heart and/or breathing stops. I communicated to Alicia Retana that a DNR form was completed and will be scanned into EPIC.  I spent a total of 20 minutes engaging the patient in this advance care planning  discussion.    Elevated Troponin  Trop 1.3>1.0  EKG no acute ischemic changes  Patient not complaining of CP  Suspect elevated in setting of COVID19 infection, ESRD     Renovascular Hypertension              Pulmonary Hypertension with ESRD on Dialysis   Continue regular dialysis MWF  Continue home hydralazine prn SBP >170  BP controlled on admission     History of GI bleed   Chronic blood loss anemia   Hgb 10.1 at baseline  Continue home Protonix     Seizures  Continue home Keppra      Severe malnutrition   Nursing home confirmed patient eats orally and receives supplemental Novasource through PEG   Will continue Novasource 50 cc/hr x 10 hrs    VTE High Risk Prophylaxis: enoxaparin 40mg sq QHS @ 2100 (bundled care) if GFR >30    Patient's chronic/stable medical conditions noted in the problem list above will be managed with the patient's home medications as tolerated.       Subsequent Inpatient Hospital CareLevel 3 [id number] Total visit time was 35 minutes or greater with greater than 50% of time spent in counseling and coordination of care.   Ray Brown M.D.

## 2020-04-02 NOTE — SUBJECTIVE & OBJECTIVE
Interval History: Nephrology consulted for ESRD Management    Review of patient's allergies indicates:   Allergen Reactions    Fosrenol [lanthanum] Nausea And Vomiting     Nausea and vomiting     Current Facility-Administered Medications   Medication Frequency    0.9%  NaCl infusion PRN    0.9%  NaCl infusion Once    0.9%  NaCl infusion PRN    0.9%  NaCl infusion Once    acetaminophen tablet 650 mg Q4H PRN    albuterol inhaler 2 puff Q6H PRN    atorvastatin tablet 40 mg QHS    cinacalcet tablet 60 mg Once    dextromethorphan-guaifenesin  mg/5 ml liquid 10 mL Q4H PRN    dextrose 10% (D10W) Bolus PRN    dextrose 10% (D10W) Bolus PRN    epoetin la nena-epbx injection 2,700 Units Every Tues, Thurs, Sat    glucagon (human recombinant) injection 1 mg PRN    glucose chewable tablet 16 g PRN    glucose chewable tablet 24 g PRN    hydrALAZINE tablet 25 mg Q8H PRN    levetiracetam oral soln Soln 250 mg BID    melatonin tablet 6 mg Nightly PRN    metoclopramide HCl tablet 10 mg TID AC    midodrine tablet 5 mg Every Mon, Wed, Fri    ondansetron disintegrating tablet 8 mg Q8H PRN    pantoprazole injection 40 mg Daily    promethazine (PHENERGAN) 6.25 mg in dextrose 5 % 50 mL IVPB Q6H PRN    senna tablet 2 tablet Daily    sevelamer carbonate tablet 800 mg BID WM    sodium chloride 0.9% flush 10 mL PRN       Objective:     Vital Signs (Most Recent):  Temp: 96.1 °F (35.6 °C) (04/02/20 0859)  Pulse: 100 (04/02/20 1100)  Resp: 16 (04/02/20 0859)  BP: 106/64 (04/02/20 0859)  SpO2: 98 % (04/02/20 1100)  O2 Device (Oxygen Therapy): room air (04/01/20 1537) Vital Signs (24h Range):  Temp:  [96.1 °F (35.6 °C)-99.8 °F (37.7 °C)] 96.1 °F (35.6 °C)  Pulse:  [] 100  Resp:  [16-18] 16  SpO2:  [88 %-99 %] 98 %  BP: (106-123)/(64-77) 106/64     Weight: 54 kg (119 lb 0.8 oz) (03/28/20 2200)  Body mass index is 19.21 kg/m².  Body surface area is 1.59 meters squared.    I/O last 3 completed shifts:  In: 800  [NG/GT:800]  Out: 0     Physical Exam   Constitutional:   PE deferred d/t COVID-19 precautions       Significant Labs:  All labs within the past 24 hours have been reviewed.     Significant Imaging:  Labs: Reviewed

## 2020-04-02 NOTE — PROGRESS NOTES
[facility]  Nephrology  Progress Note    Patient Name: Vaughn Retana  MRN: [id number]  Admission Date: 3/28/2020  Hospital Length of Stay: 0 days  Attending Provider: Ray Brown MD   Primary Care Physician: Cori Randall MD  Principal Problem:COVID-19 virus infection    Subjective:     HPI: 55 y/o male with PMHx ESRD on iHD (MWF), GI bleeds, + COVID-19 (3/25/20)        Interval History: Nephrology consulted for ESRD Management    Review of patient's allergies indicates:   Allergen Reactions    Fosrenol [lanthanum] Nausea And Vomiting     Nausea and vomiting     Current Facility-Administered Medications   Medication Frequency    0.9%  NaCl infusion PRN    0.9%  NaCl infusion Once    0.9%  NaCl infusion PRN    0.9%  NaCl infusion Once    acetaminophen tablet 650 mg Q4H PRN    albuterol inhaler 2 puff Q6H PRN    atorvastatin tablet 40 mg QHS    cinacalcet tablet 60 mg Once    dextromethorphan-guaifenesin  mg/5 ml liquid 10 mL Q4H PRN    dextrose 10% (D10W) Bolus PRN    dextrose 10% (D10W) Bolus PRN    epoetin la nena-epbx injection 2,700 Units Every Tues, Thurs, Sat    glucagon (human recombinant) injection 1 mg PRN    glucose chewable tablet 16 g PRN    glucose chewable tablet 24 g PRN    hydrALAZINE tablet 25 mg Q8H PRN    levetiracetam oral soln Soln 250 mg BID    melatonin tablet 6 mg Nightly PRN    metoclopramide HCl tablet 10 mg TID AC    midodrine tablet 5 mg Every Mon, Wed, Fri    ondansetron disintegrating tablet 8 mg Q8H PRN    pantoprazole injection 40 mg Daily    promethazine (PHENERGAN) 6.25 mg in dextrose 5 % 50 mL IVPB Q6H PRN    senna tablet 2 tablet Daily    sevelamer carbonate tablet 800 mg BID WM    sodium chloride 0.9% flush 10 mL PRN       Objective:     Vital Signs (Most Recent):  Temp: 96.1 °F (35.6 °C) (04/02/20 0859)  Pulse: 100 (04/02/20 1100)  Resp: 16 (04/02/20 0859)  BP: 106/64 (04/02/20 0859)  SpO2: 98 % (04/02/20 1100)  O2 Device  (Oxygen Therapy): room air (04/01/20 1537) Vital Signs (24h Range):  Temp:  [96.1 °F (35.6 °C)-99.8 °F (37.7 °C)] 96.1 °F (35.6 °C)  Pulse:  [] 100  Resp:  [16-18] 16  SpO2:  [88 %-99 %] 98 %  BP: (106-123)/(64-77) 106/64     Weight: 54 kg (119 lb 0.8 oz) (03/28/20 2200)  Body mass index is 19.21 kg/m².  Body surface area is 1.59 meters squared.    I/O last 3 completed shifts:  In: 800 [NG/GT:800]  Out: 0     Physical Exam   Constitutional:   PE deferred d/t COVID-19 precautions       Significant Labs:  All labs within the past 24 hours have been reviewed.     Significant Imaging:  Labs: Reviewed    Assessment/Plan:     * COVID-19 virus infection  Managed by Hospital Medicine    ESRD on dialysis  -HD today  -CBC, renal function panel with labs  -meds to renal parameters    -Hgb 8.6  PMHx: GIB  Ferritin 1762 3/28/20  Continue epogen  Managed by [facility]    Corrected Ca+ 10.1  -Ca+ to be adjusted in dialysate bath  -on cinacalcet  Ph- (add-on)  -sevelamer carbonate to BID  On renal  -receiving Novasource with tube feeds    /64  -on midodrine    [facility]  Dr. Lemus  MWF  3:45  AVF  DW 52.5kg            Thank you for your consult. I will follow-up with patient. Please contact us if you have any additional questions.    Nayeli Orozco, SEAN  Nephrology  [facility]

## 2020-04-02 NOTE — NURSING
Pt to dialysis via bed and transporters.  Pt explained plan of care.  Pt does not interact with staff. Pt does speak on telephone and appears appropriate. Pt tube feeding stopper per 10 hour order.  Residual of 20cc. Peg tube flushed and clamped.  Pt does not participate in care. Pt denies pain and states no needs.

## 2020-04-02 NOTE — ASSESSMENT & PLAN NOTE
-HD today  -CBC, renal function panel with labs  -meds to renal parameters    -Hgb 8.6  PMHx: GIB  Ferritin 1762 3/28/20  Continue epogen  Managed by Hospital Medicine    Corrected Ca+ 10.1  -Ca+ to be adjusted in dialysate bath  -on cinacalcet  Ph- (add-on)  -sevelamer carbonate to BID  On renal  -receiving Novasource with tube feeds    /64  -on midodrine    [facility]  Dr. Lemus  MWF  3:45  AVF  DW 52.5kg

## 2020-04-03 NOTE — PROGRESS NOTES
Pharmacist Intervention IV to PO Note    Vaughn Retana is a 54 y.o. male being treated with IV medication pantoprazole    Patient Data:    Vital Signs (Most Recent):  Temp: 96.2 °F (35.7 °C) (04/03/20 0923)  Pulse: 102 (04/03/20 0923)  Resp: 16 (04/03/20 0923)  BP: 115/60 (04/03/20 0923)  SpO2: 100 % (04/03/20 0923) Vital Signs (72h Range):  Temp:  [96.1 °F (35.6 °C)-101.6 °F (38.7 °C)]   Pulse:  []   Resp:  [16-24]   BP: ()/(51-84)   SpO2:  [65 %-100 %]      CBC:  Recent Labs   Lab 04/01/20  0505 04/02/20  1502 04/03/20  0008   WBC 5.05 6.36 6.96   RBC 3.08* 2.80* 2.91*   HGB 8.6* 7.7* 8.0*   HCT 27.6* 24.8* 25.7*    391* 320   MCV 90 89 88   MCH 27.9 27.5 27.5   MCHC 31.2* 31.0* 31.1*     CMP:     Recent Labs   Lab 03/30/20  0009 04/01/20  0505 04/02/20  1502 04/03/20  0008   * 162* 192* 152*   CALCIUM 7.8* 8.5* 8.9 9.3   ALBUMIN 2.2* 2.0* 2.0* 2.1*   PROT 7.5 7.8  --  8.6*   * 136 136 136   K 3.7 3.6 3.9 3.9   CO2 22* 23 22* 22*   CL 94* 99 98 99   BUN 67* 49* 79* 35*   CREATININE 8.6* 7.4* 9.4* 5.3*   ALKPHOS 81 80  --  75   ALT 9* 8*  --  10   AST 36 29  --  24   BILITOT 0.4 0.4  --  0.5       Dietary Orders:  Diet Orders            Tube Feedings/Formulas [facility] Renal; 50; Gastrostomy; Ready to Hang Liter: Tube Feeding (I) starting at 03/28 1506    Diet renal: Renal starting at 03/28 1302            Based on the following criteria, this patient qualifies for intravenous to oral conversion:  [x] The patients gastrointestinal tract is functioning (tolerating medications via oral or enteral route for 24 hours and tolerating food or enteral feeds for 24 hours).      IV medication pantoprazole 40 mg daily will be changed to oral medication pantoprazole 40 mg daily    Pharmacist's Name: Alyson Chávez  Pharmacist's Extension: [phone]

## 2020-04-03 NOTE — PROGRESS NOTES
"Hospital Medicine  Progress Note  [facility]      Patient Name: Vaughn Retana  MRN:  [id number]  Hospital Medicine Team: VIRTUAL HOSPITAL MEDICINE TEAM 2 Ray Brown MD  Date of Admission:  3/28/2020     Length of Stay:  LOS: 0 days       Principal Problem:  COVID-19 virus infection      Hospital Course:  Patient admitted from NH for of COVID-19. Has maintained sats ORA       This service was provided through telemedicine.  Visit type: Virtual visit with synchronous audio and video  Start time: 8:57  aint: SOB  The patient location is: [address] [place] [address] A  Present with the patient at the time of the telemedicine/virtual assessment:   End time: 9:02  Total time spent with patient: 5 min    Interval History:     4/03 T max of 101. CRP of 200, On RA satting at 100%. Prior to this had been stable.  4/02 Readyy for DC to SNF No reported events overnight. Maintaining sats ORA. Pt still not very cooperative with exam, he shakes his head no to ROS questions. Per CM, [facility] is not accepting previous [facility] patients. Will await updates  Review of Systems:  Patient shakes head "no" when asked about shortness of breath, chest pain, or cough.     Inpatient Medications:    Current Facility-Administered Medications:     [START ON 4/4/2020] 0.9%  NaCl infusion, , Intravenous, PRN, SEAN Santos    [START ON 4/4/2020] 0.9%  NaCl infusion, , Intravenous, Once, SEAN Santos    acetaminophen tablet 650 mg, 650 mg, Oral, Q4H PRN, Charissa Abraham PA-C, 650 mg at 04/02/20 2147    albuterol inhaler 2 puff, 2 puff, Inhalation, Q6H PRN **AND** MDI Q6H PRN, , , Q6H PRN, Ricky Morgan MD    atorvastatin tablet 40 mg, 40 mg, Per G Tube, QHS, Aleja Donovan PA-C, 40 mg at 04/02/20 2112    cinacalcet tablet 60 mg, 60 mg, Oral, Once, LILIAN SantosP    dextromethorphan-guaifenesin  mg/5 ml liquid 10 mL, 10 mL, Oral, Q4H PRN, Charissa Abraham PA-C    dextrose 10% (D10W) " Bolus, 12.5 g, Intravenous, PRN, Michael Potter MD    dextrose 10% (D10W) Bolus, 25 g, Intravenous, PRN, Michael Potter MD    epoetin la nena-epbx injection 2,700 Units, 50 Units/kg, Intravenous, Every Tues, Thurs, Sat, Nayeli Orozco, FNP    glucagon (human recombinant) injection 1 mg, 1 mg, Intramuscular, PRN, Charissa Abraham PA-C    glucose chewable tablet 16 g, 16 g, Oral, PRN, Charissa [place]or[place]lyn, PA-C    glucose chewable tablet 24 g, 24 g, Oral, PRN, Charissa Abraham, PA-C    hydrALAZINE tablet 25 mg, 25 mg, Oral, Q8H PRN, Charissa Abraham PA-C    levetiracetam oral soln Soln 250 mg, 250 mg, Per G Tube, BID, PRATIK Archuleta-C, 250 mg at 04/03/20 0854    melatonin tablet 6 mg, 6 mg, Oral, Nightly PRN, Charissa Abraham PA-C, 6 mg at 03/31/20 2255    metoclopramide HCl tablet 10 mg, 10 mg, Per G Tube, TID AC, Aleja Donovan PA-C, 10 mg at 04/03/20 0735    midodrine tablet 5 mg, 5 mg, Per G Tube, Every Mon, Wed, Fri, Aleja Donovan PA-C, 5 mg at 04/01/20 1700    ondansetron disintegrating tablet 8 mg, 8 mg, Oral, Q8H PRN, Charissa Abraham PA-C    pantoprazole injection 40 mg, 40 mg, Intravenous, Daily, Aleja Donovan PA-C, 40 mg at 04/03/20 0847    promethazine (PHENERGAN) 6.25 mg in dextrose 5 % 50 mL IVPB, 6.25 mg, Intravenous, Q6H PRN, Charissa Abraham PA-C    senna tablet 2 tablet, 2 tablet, Per G Tube, Daily, PRATIK Archuleta-C, 2 tablet at 04/03/20 0847    sevelamer carbonate tablet 800 mg, 800 mg, Oral, BID WM, Rekha Denson PA-C, 800 mg at 04/03/20 0848    sodium chloride 0.9% flush 10 mL, 10 mL, Intravenous, PRN, Michael Potter MD      Physical Exam:      Intake/Output Summary (Last 24 hours) at 4/3/2020 0902  Last data filed at 4/3/2020 0633  Gross per 24 hour   Intake 1970 ml   Output 1999 ml   Net -29 ml     Wt Readings from Last 3 Encounters:   03/28/20 54 kg (119 lb 0.8 oz)   03/25/20 54.4 kg (120 lb)   03/21/20 54.4 kg (120 lb)       BP (!) 93/58 (BP Location: Left arm, Patient  "Position: Lying)   Pulse 104   Temp 97 °F (36.1 °C) (Axillary)   Resp (!) 24   Ht 5' 6" (1.676 m)   Wt 54 kg (119 lb 0.8 oz)   SpO2 100%   BMI 19.21 kg/m²     GEN: NAD, not conversant  Resp: normal work of breathing   CV: no edema  MSK: L BKA  Neuro: awake, alert, will nod yes and no to some questions; not participatory in exam or questioning    Laboratory:  Lab Results   Component Value Date    [id number] Detected (A) 03/25/2020       Recent Labs   Lab 04/01/20  0505 04/02/20  1502 04/03/20  0008   WBC 5.05 6.36 6.96   LYMPH 15.8*  0.8* 13.2*  0.8* 8.9*  0.6*   HGB 8.6* 7.7* 8.0*   HCT 27.6* 24.8* 25.7*    391* 320     Recent Labs   Lab 03/30/20  0009 04/01/20  0505 04/02/20  1502 04/03/20  0008   * 136 136 136   K 3.7 3.6 3.9 3.9   CL 94* 99 98 99   CO2 22* 23 22* 22*   BUN 67* 49* 79* 35*   CREATININE 8.6* 7.4* 9.4* 5.3*   * 162* 192* 152*   CALCIUM 7.8* 8.5* 8.9 9.3   MG 2.0  --  2.0 1.8   PHOS 2.5* 2.2* 1.9* 1.7*     Recent Labs   Lab 03/30/20  0009 04/01/20  0505 04/02/20  1502 04/03/20  0008   ALKPHOS 81 80  --  75   ALT 9* 8*  --  10   AST 36 29  --  24   ALBUMIN 2.2* 2.0* 2.0* 2.1*   PROT 7.5 7.8  --  8.6*   BILITOT 0.4 0.4  --  0.5        Recent Labs     03/31/20  2359 04/03/20  0008   .4* 200.1*       All labs within the last 24 hours were reviewed.     Microbiology:  Microbiology Results (last 7 days)     Procedure Component Value Units Date/Time    Blood culture [[id number]] Collected:  03/28/20 1407    Order Status:  Completed Specimen:  Blood from Peripheral, Forearm, Left Updated:  04/01/20 1612     Blood Culture, Routine No Growth after 4 days.     Blood culture [[id number]] Collected:  03/28/20 1345    Order Status:  Completed Specimen:  Blood from Peripheral, Antecubital, Left Updated:  04/01/20 1612     Blood Culture, Routine No Growth after 4 days.             Imaging  ECG Results          EKG 12-lead (Final result)  Result time 03/29/20 23:09:47    Final " result by Interface, Lab In [facility] (03/29/20 23:09:47)                 Narrative:    Test Reason : R06.02,    Vent. Rate : 103 BPM     Atrial Rate : 103 BPM     P-R Int : 146 ms          QRS Dur : 098 ms      QT Int : 382 ms       P-R-T Axes : 089 -49 066 degrees     QTc Int : 500 ms    Sinus tachycardia  Right ventricular conduction delay  Left anterior fascicular block  Voltage criteria for left ventricular hypertrophy  Nonspecific T wave abnormality  Abnormal ECG  When compared with ECG of 25-MAR-2020 13:54,  The axis Shifted left  Nonspecific T wave abnormality no longer evident in Inferior leads  Confirmed by Zeke Burns MD ([id number]) on 3/29/2020 11:09:42 PM    Referred By: [email or web address]   SELF           Confirmed By:Zeke Burns MD                              No results found for this or any previous visit.    X-Ray Chest AP Portable  Narrative: EXAMINATION:  XR CHEST AP PORTABLE    CLINICAL HISTORY:  Fever, COVID+;    TECHNIQUE:  Single frontal view of the chest was performed.    COMPARISON:  03/25/2020 and 03/21/2020    FINDINGS:  The patient is rotated on this exam limiting evaluation of the left lung.  Platelike airspace opacities are identified in the left upper and left lower lobes.  No focal airspace consolidation.  No pneumothorax or pleural effusion.  The cardiomediastinal silhouette is normal.    Incidental note is made of a right subclavian vascular stent.  The osseous and soft tissue structures are normal.  Impression: Findings compatible with platelike atelectasis in the left upper and lower lobes.  Overall no significant interval change.    Electronically signed by: Kassy Oro MD  Date:    03/28/2020  Time:    12:38      All imaging within the last 24 hours was reviewed.     Assessment and Plan:    Active Hospital Problems    Diagnosis  POA    *COVID-19 virus infection [U07.1]  Yes    Fever [R50.9]  Yes    ESRD on dialysis [N18.6, Z99.2]  Not Applicable    Seizure [R56.9]  Yes     Chronic blood loss anemia [D50.0]  Yes    Personal history of latent tuberculosis infection [Z86.15]  Yes    Severe malnutrition [E43]  Yes     Assessment and Plan  Severe Malnutrition in the context of Social/Environmental Circumstances     Related to (etiology):  Unknown etiology     Signs and Symptoms (as evidenced by):  Energy Intake: per pt, <75% intake over the last year  Body Fat Depletion: overall subcutaneous fat loss, moderate triceps fat loss and protruding patellas.  Muscle Mass Depletion:  moderate muscle wasting of interosseous, temporal, clavicle, calves and quadriceps  Weight Loss: 24% (39 lbs) x 1 year    Recommendations     Recommendation/Intervention: 1. Should pt be cleared for po diet, recommend renal diet with texture per SLP along with Novasource ONS TID. 2. Should pt require enteral feeding, initiate Novasource renal formula at 10ml/hr and advance 10ml Q4hrs to goal rate of 40ml/hr (provides 1920kcal, 87g pro, 691ml free water). Provide additional 500ml water flush/24 hrs. Hold for residuals >500ml.  Goals: 1. Pt to receive nutrition < 48 hrs.      History of GI bleed [Z87.19]  Not Applicable    Renovascular hypertension [I15.0]  Yes     Chronic    Pulmonary hypertension associated with end-stage renal disease (ESRD) on dialysis [I27.29, N18.6, Z99.2]  Not Applicable     Chronic    Chronic diastolic heart failure [I50.32]  Yes     Chronic     2-23-17    1 - Low normal to mildly depressed left ventricular systolic function (EF 50-55%).     2 - Right ventricular enlargement with mildly depressed systolic function.     3 - Left ventricular diastolic dysfunction.     4 - Right atrial enlargement.     5 - Severe tricuspid regurgitation.     6 - Pulmonary hypertension. The estimated PA systolic pressure is 86 mmHg.     7 - Increased central venous pressure.       Secondary hyperparathyroidism of renal origin [N25.81]  Yes      Resolved Hospital Problems   No resolved problems to display.        Suspected Covid-19 Virus Infection  Person Under Investigation (PUI) for COVID-19  - COVID-19 testing: Collection Date: 3/25/2020 Collection Time:   2:10 PM  - Infection Control notified    - Isolation:   - Airborne and Droplet Precautions  - Surgical mask on patient   - N95 masks must be fit tested, wear eye protection  - 20 second hand hygiene   - Limit visitors per hospital policy   - Consolidate lab draws, nursing care, and interventions    - Diagnostics: (Lymphopenia, hyponatremia, hyperferritinemia, elevated troponin, elevated d-dimer, age, and comorbidities are significant predictors of poor clinical outcome)     - CBC: Hgb 10.1                     trend Q48hrs  - CMP: BUN 44, Cr 6.7           trend Q48hrs  - Procalcitonin: 2.24  - D-dimer: 0.98                                    repeat prior to discharge  - Ferritin: 1762                         repeat prior to discharge   - CRP: 185.1    >132                       trend Q48hrs downtrending  - LDH:  ordered                        repeat prior to discharge  - BNP: ordered  - Troponin: 1.344                      - ECG: estrella acute ischemic changes              - rapid Flu: negative on 3/21/20              - RIP only if BMT/solid transplant:              - Legionella antigen: ordered              - Blood culture x2: NGTD              - Sputum culture: ordered               - CXR: platelike atelectasis in the left upper and lower lobes              - UA and culture: ordered              - CPK: 202    - Management:   Bundle care as able to maintain isolation & minimize in/out of room   - Supplemental O2 to maintain SpO2 92%-96%   if requiring 6L NC or higher, place on nonrebreather and discuss case with MICU   - Telemetry & continuous pulse oximetry    - If wheezing   - albuterol inhaler 2-4 puff Q6hr PRN    - ipratropium daily    - acetaminophen 650mg PO Q6hr PRN fever   - loperamide PRN for viral diarrhea  - Empiric antibiotics per likely source & patient  allergies    - CAP: x 5 day course (d/c early if low concern for bacterial co-infection)  Ceftriaxone 1g IV Q24hrs            Azithromycin 500mg IV day #1, then 250mg PO daily x4 days     If azithromycin is not available, start doxycycline                 If MRSA risk factors, add Vancomycin IV (PharmD consult)   - Investigational Treatment Protocol: (if patient meets criteria)   [email or web address]     - statin: atorvastatin 40mg po daily (if CPK WNL)    - start HCQ 400mg PO BID x1 day, then 400mg PO daily x 4 days   (check glucose 6 phosphate dehydrogenase (NOT G6PD Quant), ECG, and start Qshift POCT glucose)    Safety notes:   - Avoid NIPPV (CPAP/BiPAP) to prevent aerosolization, use on a case-by-case basis if in neg pressure room   - Cautious use of NSAIDS for fever per WHO recommendations (3/16/2020)   - No new ACEi/ARB start or discontinuation of chronic med unless hypotensive (Esler et al. Journal of Hypertension 2020, 38:000-000)   - Careful use of steroids in the absence of other indications (shock, ARDS)   - Fluid sparing resuscitation, avoid maintenance fluids     Advance Care Planning  Goals of care, counseling/discussion In light of the patients advanced and life limiting illness,I engaged the the patient's sister, Alicia Retana, in a conversation about the patient's preferences for care  at the very end of life. The patient wishes to have a natural, peaceful death.  Along those lines, the patient does not wish to have CPR or other invasive treatments performed when his heart and/or breathing stops. I communicated to Alicia Retana that a DNR form was completed and will be scanned into EPIC.  I spent a total of 20 minutes engaging the patient in this advance care planning discussion.    Elevated Troponin  Trop 1.3>1.0  EKG no acute ischemic changes  Patient not complaining of CP  Suspect elevated  in setting of COVID19 infection, ESRD     Renovascular Hypertension              Pulmonary Hypertension with ESRD on Dialysis   Continue regular dialysis MWF  Continue home hydralazine prn SBP >170  BP controlled on admission     History of GI bleed   Chronic blood loss anemia   Hgb 10.1 at baseline  Continue home Protonix     Seizures  Continue home Keppra      Severe malnutrition   Nursing home confirmed patient eats orally and receives supplemental Novasource through PEG   Will continue Novasource 50 cc/hr x 10 hrs    VTE High Risk Prophylaxis: enoxaparin 40mg sq QHS @ 2100 (bundled care) if GFR >30    Patient's chronic/stable medical conditions noted in the problem list above will be managed with the patient's home medications as tolerated.       Subsequent Inpatient Hospital CareLevel 3 [id number] Total visit time was 35 minutes or greater with greater than 50% of time spent in counseling and coordination of care.   Ray Brown M.D.

## 2020-04-03 NOTE — NURSING
PRATIK Mendez informed of pts temp of 101.6 axillary and heart rate that has been Tachy 110-120s all afternoon. States to medicate pt with Tylenol prn and remove blankets. Comfort and safety maintained. Will continue to monitor

## 2020-04-03 NOTE — NURSING
IV site discontinued per protocol to the left AC due to leaking. New site started to Left forearm with 20 G catheter. Pt tolerated well. Pt turned. Reassurance given. Comfort and safety maintained.

## 2020-04-03 NOTE — PLAN OF CARE
Plan of care continued per orders. COVID isolation maintained. No falls noted. Tube feeding in progress per orders. Pt tolerated well. No residual noted. Comfort and safety maintained. Call light in reach.

## 2020-04-03 NOTE — PLAN OF CARE
CM spoke w/ Matilde from [facility] - the additional 14 beds at [facility] are on hold and have not been opened. Pt is at the top of the list when beds are made available. RUSTY contacted Jose Manuel w/ [facility] [phone] and updated her that pt's transfer is on hold and CM will f/u on Monday - Jose Manuel will update [facility] (isolation unit) when pt will return to dialysis and he will start a TTS schedule.    Wknd RUSTY SALINAS [phone] - assisting 7th floor 4/3       04/03/20 0086   Discharge Reassessment   Assessment Type Discharge Planning Reassessment   Discharge Plan A New Nursing Home placement - senior care care facility   Discharge Plan B Return to Nursing Home   Anticipated Discharge Disposition longterm Nu   Post-Acute Status   Post-Acute Authorization Placement;Dialysis   Post-Acute Placement Status Pending Bed Availability   Diaylsis Status Set-up Complete   Discharge Delays (!) Other

## 2020-04-03 NOTE — NURSING
Tube feeding started per order with NovaSource Renal at 50 ml/hr. No residual noted from peg. Peg flushes with gravity. Comfort and safety maintained. Call light in reach.

## 2020-04-04 NOTE — NURSING
Pt in bed Hr noted to me in 120's-130's on monitor. Checked temp elevated at 102.3 gave tylenol PRN will recheck. Pt suctioned and repositioned. Will continue to monitor.

## 2020-04-04 NOTE — PLAN OF CARE
Pt was tachy for > 15 mins at which time I contacted provider, Dr. Brown @ 1713 called me back and stated he will look into it and get back as of end of shift haven't heard from provider passed it to night shift nurse. Pt's bed is in lowest position, wheels locked, call light and phone within reach of him, he has not experienced any falls or injuries on this shift.

## 2020-04-04 NOTE — PROGRESS NOTES
"Hospital Medicine  Progress Note  [facility]      Patient Name: Vaughn Retana  MRN:  [id number]  Hospital Medicine Team: Networked reference to record PCT  Ray Brown MD  Date of Admission:  3/28/2020     Length of Stay:  LOS: 0 days       Principal Problem:  COVID-19 virus infection      Hospital Course:  Patient admitted from NH for of COVID-19. Has maintained sats ORA       This service was provided through telemedicine.  Visit type: Virtual visit with synchronous audio and video  Start time: 8:17  aint: SOB  The patient location is: [facility]  Present with the patient at the time of the telemedicine/virtual assessment:   End time: 8:22  Total time spent with patient: 5 min    Interval History:     4/04 T max of 102 CRP of 200 on 4/03, On RA satting at 100%. Prior to this had been stable. DNR.  4/02 Readyy for DC to SNF No reported events overnight. Maintaining sats ORA. Pt still not very cooperative with exam, he shakes his head no to ROS questions. Per CM, [facility] is not accepting previous [facility] patients. Will await updates  Review of Systems:  Patient shakes head "no" when asked about shortness of breath, chest pain, or cough.     Inpatient Medications:    Current Facility-Administered Medications:     0.9%  NaCl infusion, , Intravenous, PRN, Nayeli Orozco, LILIANP    0.9%  NaCl infusion, , Intravenous, Once, SEAN Santos    acetaminophen tablet 650 mg, 650 mg, Oral, Q4H PRN, Charissa Abraham PA-C, 650 mg at 04/03/20 1949    albuterol inhaler 2 puff, 2 puff, Inhalation, Q6H PRN **AND** MDI Q6H PRN, , , Q6H PRN, Ricky Morgan MD    atorvastatin tablet 40 mg, 40 mg, Per G Tube, QHS, Aleja Donovan PA-C, 40 mg at 04/03/20 2036    cinacalcet tablet 60 mg, 60 mg, Oral, Once, LILIAN SantosP    dextromethorphan-guaifenesin  mg/5 ml liquid 10 mL, 10 mL, Oral, Q4H PRN, Charissa Abraham PA-C    dextrose 10% (D10W) Bolus, 12.5 g, " Intravenous, PRN, Michael Potter MD    dextrose 10% (D10W) Bolus, 25 g, Intravenous, PRN, Michael Potter MD    epoetin la nena-epbx injection 2,700 Units, 50 Units/kg, Intravenous, Every Tues, Thurs, Sat, Nayeli Orozco, FNP    glucagon (human recombinant) injection 1 mg, 1 mg, Intramuscular, PRN, Charissa Abraham PA-C    glucose chewable tablet 16 g, 16 g, Oral, PRN, PRATIK Weir-C    glucose chewable tablet 24 g, 24 g, Oral, PRN, PRATIK Weir-[place]    hydrALAZINE tablet 25 mg, 25 mg, Oral, Q8H PRN, Charissa Abraham PA-C    levetiracetam oral soln Soln 250 mg, 250 mg, Per G Tube, BID, Aleja Donovan PA-C, 250 mg at 04/03/20 2036    melatonin tablet 6 mg, 6 mg, Oral, Nightly PRN, PRATIK Weir-[place], 6 mg at 03/31/20 2255    metoclopramide HCl tablet 10 mg, 10 mg, Per G Tube, TID AC, Aleja Donovan PA-C, 10 mg at 04/04/20 0517    midodrine tablet 5 mg, 5 mg, Per G Tube, Every Mon, Wed, Fri, Aleja Donovan PA-C, 5 mg at 04/03/20 1700    ondansetron disintegrating tablet 8 mg, 8 mg, Oral, Q8H PRN, Charissa Abraham PA-C    pantoprazole EC tablet 40 mg, 40 mg, Oral, Daily, Ray Brown MD, 40 mg at 04/03/20 2037    promethazine (PHENERGAN) 6.25 mg in dextrose 5 % 50 mL IVPB, 6.25 mg, Intravenous, Q6H PRN, Charissa Abraham PA-C    senna tablet 2 tablet, 2 tablet, Per G Tube, Daily, Aleja Donovan PA-C, 2 tablet at 04/03/20 0847    sevelamer carbonate tablet 800 mg, 800 mg, Oral, BID WM, Rekha Denson PA-C, 800 mg at 04/03/20 1715    sodium chloride 0.9% flush 10 mL, 10 mL, Intravenous, PRN, Michael Potter MD      Physical Exam:      Intake/Output Summary (Last 24 hours) at 4/4/2020 0822  Last data filed at 4/3/2020 1645  Gross per 24 hour   Intake 100 ml   Output --   Net 100 ml     Wt Readings from Last 3 Encounters:   03/28/20 54 kg (119 lb 0.8 oz)   03/25/20 54.4 kg (120 lb)   03/21/20 54.4 kg (120 lb)       BP (!) 117/49 (Patient Position: Lying)   Pulse 99   Temp 96.6 °F (35.9 °C)  "(Axillary)   Resp 20   Ht 5' 6" (1.676 m)   Wt 54 kg (119 lb 0.8 oz)   SpO2 (!) 94%   BMI 19.21 kg/m²     GEN: NAD, not conversant  Resp: normal work of breathing   CV: no edema  MSK: L BKA  Neuro: awake, alert, will nod yes and no to some questions; not participatory in exam or questioning    Laboratory:  Lab Results   Component Value Date    [id number] Detected (A) 03/25/2020       Recent Labs   Lab 04/01/20  0505 04/02/20  1502 04/03/20  0008   WBC 5.05 6.36 6.96   LYMPH 15.8*  0.8* 13.2*  0.8* 8.9*  0.6*   HGB 8.6* 7.7* 8.0*   HCT 27.6* 24.8* 25.7*    391* 320     Recent Labs   Lab 03/30/20  0009 04/01/20  0505 04/02/20  1502 04/03/20  0008   * 136 136 136   K 3.7 3.6 3.9 3.9   CL 94* 99 98 99   CO2 22* 23 22* 22*   BUN 67* 49* 79* 35*   CREATININE 8.6* 7.4* 9.4* 5.3*   * 162* 192* 152*   CALCIUM 7.8* 8.5* 8.9 9.3   MG 2.0  --  2.0 1.8   PHOS 2.5* 2.2* 1.9* 1.7*     Recent Labs   Lab 03/30/20  0009 04/01/20  0505 04/02/20  1502 04/03/20  0008   ALKPHOS 81 80  --  75   ALT 9* 8*  --  10   AST 36 29  --  24   ALBUMIN 2.2* 2.0* 2.0* 2.1*   PROT 7.5 7.8  --  8.6*   BILITOT 0.4 0.4  --  0.5        Recent Labs     04/03/20  0008   .1*       All labs within the last 24 hours were reviewed.     Microbiology:  Microbiology Results (last 7 days)     Procedure Component Value Units Date/Time    Blood culture [[id number]] Collected:  03/28/20 1407    Order Status:  Completed Specimen:  Blood from Peripheral, Forearm, Left Updated:  04/01/20 1612     Blood Culture, Routine No Growth after 4 days.     Blood culture [[id number]] Collected:  03/28/20 1345    Order Status:  Completed Specimen:  Blood from Peripheral, Antecubital, Left Updated:  04/01/20 1612     Blood Culture, Routine No Growth after 4 days.             Imaging  ECG Results          EKG 12-lead (Final result)  Result time 03/29/20 23:09:47    Final result by Interface, Lab In [facility] (03/29/20 23:09:47)                 " Narrative:    Test Reason : R06.02,    Vent. Rate : 103 BPM     Atrial Rate : 103 BPM     P-R Int : 146 ms          QRS Dur : 098 ms      QT Int : 382 ms       P-R-T Axes : 089 -49 066 degrees     QTc Int : 500 ms    Sinus tachycardia  Right ventricular conduction delay  Left anterior fascicular block  Voltage criteria for left ventricular hypertrophy  Nonspecific T wave abnormality  Abnormal ECG  When compared with ECG of 25-MAR-2020 13:54,  The axis Shifted left  Nonspecific T wave abnormality no longer evident in Inferior leads  Confirmed by Zeke Burns MD ([id number]) on 3/29/2020 11:09:42 PM    Referred By: KHARI   SELF           Confirmed By:Zeke Burns MD                              No results found for this or any previous visit.    X-Ray Chest AP Portable  Narrative: EXAMINATION:  XR CHEST AP PORTABLE    CLINICAL HISTORY:  Fever, COVID+;    TECHNIQUE:  Single frontal view of the chest was performed.    COMPARISON:  03/25/2020 and 03/21/2020    FINDINGS:  The patient is rotated on this exam limiting evaluation of the left lung.  Platelike airspace opacities are identified in the left upper and left lower lobes.  No focal airspace consolidation.  No pneumothorax or pleural effusion.  The cardiomediastinal silhouette is normal.    Incidental note is made of a right subclavian vascular stent.  The osseous and soft tissue structures are normal.  Impression: Findings compatible with platelike atelectasis in the left upper and lower lobes.  Overall no significant interval change.    Electronically signed by: Kassy Oro MD  Date:    03/28/2020  Time:    12:38      All imaging within the last 24 hours was reviewed.     Assessment and Plan:    Active Hospital Problems    Diagnosis  POA    *COVID-19 virus infection [U07.1]  Yes    Fever [R50.9]  Yes    ESRD on dialysis [N18.6, Z99.2]  Not Applicable    Seizure [R56.9]  Yes    Chronic blood loss anemia [D50.0]  Yes    Personal history of latent  tuberculosis infection [Z86.15]  Yes    Severe malnutrition [E43]  Yes     Assessment and Plan  Severe Malnutrition in the context of Social/Environmental Circumstances     Related to (etiology):  Unknown etiology     Signs and Symptoms (as evidenced by):  Energy Intake: per pt, <75% intake over the last year  Body Fat Depletion: overall subcutaneous fat loss, moderate triceps fat loss and protruding patellas.  Muscle Mass Depletion:  moderate muscle wasting of interosseous, temporal, clavicle, calves and quadriceps  Weight Loss: 24% (39 lbs) x 1 year    Recommendations     Recommendation/Intervention: 1. Should pt be cleared for po diet, recommend renal diet with texture per SLP along with Novasource ONS TID. 2. Should pt require enteral feeding, initiate Novasource renal formula at 10ml/hr and advance 10ml Q4hrs to goal rate of 40ml/hr (provides 1920kcal, 87g pro, 691ml free water). Provide additional 500ml water flush/24 hrs. Hold for residuals >500ml.  Goals: 1. Pt to receive nutrition < 48 hrs.      History of GI bleed [Z87.19]  Not Applicable    Renovascular hypertension [I15.0]  Yes     Chronic    Pulmonary hypertension associated with end-stage renal disease (ESRD) on dialysis [I27.29, N18.6, Z99.2]  Not Applicable     Chronic    Chronic diastolic heart failure [I50.32]  Yes     Chronic     2-23-17    1 - Low normal to mildly depressed left ventricular systolic function (EF 50-55%).     2 - Right ventricular enlargement with mildly depressed systolic function.     3 - Left ventricular diastolic dysfunction.     4 - Right atrial enlargement.     5 - Severe tricuspid regurgitation.     6 - Pulmonary hypertension. The estimated PA systolic pressure is 86 mmHg.     7 - Increased central venous pressure.       Secondary hyperparathyroidism of renal origin [N25.81]  Yes      Resolved Hospital Problems   No resolved problems to display.       Suspected Covid-19 Virus Infection  Person Under Investigation  (PUI) for COVID-19  - COVID-19 testing: Collection Date: 3/25/2020 Collection Time:   2:10 PM  - Infection Control notified    - Isolation:   - Airborne and Droplet Precautions  - Surgical mask on patient   - N95 masks must be fit tested, wear eye protection  - 20 second hand hygiene   - Limit visitors per hospital policy   - Consolidate lab draws, nursing care, and interventions    - Diagnostics: (Lymphopenia, hyponatremia, hyperferritinemia, elevated troponin, elevated d-dimer, age, and comorbidities are significant predictors of poor clinical outcome)     - CBC: Hgb 10.1                     trend Q48hrs  - CMP: BUN 44, Cr 6.7           trend Q48hrs  - Procalcitonin: 2.24  - D-dimer: 0.98                                    repeat prior to discharge  - Ferritin: 1762                         repeat prior to discharge   - CRP: 185.1    >132                       trend Q48hrs downtrending  - LDH:  ordered                        repeat prior to discharge  - BNP: ordered  - Troponin: 1.344                      - ECG: estrella acute ischemic changes              - rapid Flu: negative on 3/21/20              - RIP only if BMT/solid transplant:              - Legionella antigen: ordered              - Blood culture x2: NGTD              - Sputum culture: ordered               - CXR: platelike atelectasis in the left upper and lower lobes              - UA and culture: ordered              - CPK: 202    - Management:   Bundle care as able to maintain isolation & minimize in/out of room   - Supplemental O2 to maintain SpO2 92%-96%   if requiring 6L NC or higher, place on nonrebreather and discuss case with MICU   - Telemetry & continuous pulse oximetry    - If wheezing   - albuterol inhaler 2-4 puff Q6hr PRN    - ipratropium daily    - acetaminophen 650mg PO Q6hr PRN fever   - loperamide PRN for viral diarrhea  - Empiric antibiotics per likely source & patient allergies    - CAP: x 5 day course (d/c early if low concern for  bacterial co-infection)  Ceftriaxone 1g IV Q24hrs            Azithromycin 500mg IV day #1, then 250mg PO daily x4 days     If azithromycin is not available, start doxycycline                 If MRSA risk factors, add Vancomycin IV (PharmD consult)   - Investigational Treatment Protocol: (if patient meets criteria)   [email or web address]     - statin: atorvastatin 40mg po daily (if CPK WNL)    - start HCQ 400mg PO BID x1 day, then 400mg PO daily x 4 days   (check glucose 6 phosphate dehydrogenase (NOT G6PD Quant), ECG, and start Qshift POCT glucose)    Safety notes:   - Avoid NIPPV (CPAP/BiPAP) to prevent aerosolization, use on a case-by-case basis if in neg pressure room   - Cautious use of NSAIDS for fever per WHO recommendations (3/16/2020)   - No new ACEi/ARB start or discontinuation of chronic med unless hypotensive (Esler et al. Journal of Hypertension 2020, 38:000-000)   - Careful use of steroids in the absence of other indications (shock, ARDS)   - Fluid sparing resuscitation, avoid maintenance fluids     Advance Care Planning  Goals of care, counseling/discussion In light of the patients advanced and life limiting illness,I engaged the the patient's sister, Alicia Retana, in a conversation about the patient's preferences for care  at the very end of life. The patient wishes to have a natural, peaceful death.  Along those lines, the patient does not wish to have CPR or other invasive treatments performed when his heart and/or breathing stops. I communicated to Alicia Retana that a DNR form was completed and will be scanned into EPIC.  I spent a total of 20 minutes engaging the patient in this advance care planning discussion.    Elevated Troponin  Trop 1.3>1.0  EKG no acute ischemic changes  Patient not complaining of CP  Suspect elevated in setting of COVID19 infection, ESRD     Renovascular  Hypertension              Pulmonary Hypertension with ESRD on Dialysis   Continue regular dialysis MWF  Continue home hydralazine prn SBP >170  BP controlled on admission     History of GI bleed   Chronic blood loss anemia   Hgb 10.1 at baseline  Continue home Protonix     Seizures  Continue home Keppra      Severe malnutrition   Nursing home confirmed patient eats orally and receives supplemental Novasource through PEG   Will continue Novasource 50 cc/hr x 10 hrs    VTE High Risk Prophylaxis: enoxaparin 40mg sq QHS @ 2100 (bundled care) if GFR >30    Patient's chronic/stable medical conditions noted in the problem list above will be managed with the patient's home medications as tolerated.       Subsequent Inpatient Hospital CareLevel 3 [id number] Total visit time was 35 minutes or greater with greater than 50% of time spent in counseling and coordination of care.   Ray Brown M.D.

## 2020-04-05 NOTE — PLAN OF CARE
Non-verbal pt follows commands. NAD noted.Telly monitoring on going. No pain indicators noted. Pt requires total care. Pt not able to perform ADL's. Pt remains injury and fall free, non skid footwear donned, call light within reach, personal items within reach, bed in low/locked position, pt able to voice needs all needs voiced have been met at this time.

## 2020-04-05 NOTE — NURSING
Pt non verbal. Pt refused to have PIV rotated. Pulling both arms tight to his abd. Nocturnal Tube Feeding started. Will continue to monitor.

## 2020-04-05 NOTE — NURSING
Medication crushed and dissolved in 30ml of water given via peg tube. 60ml water flush given at after medication. Will continue to monitor.

## 2020-04-05 NOTE — PROGRESS NOTES
"Hospital Medicine  Progress Note  [facility]      Patient Name: Vaughn Retana  MRN:  [id number]  Hospital Medicine Team: VIRTUAL HOSPITAL MEDICINE TEAM 7 Ray Brown MD  Date of Admission:  3/28/2020     Length of Stay:  LOS: 1 day       Principal Problem:  COVID-19 virus infection      Hospital Course:  Patient admitted from NH for of COVID-19. Has maintained sats ORA       This service was provided through telemedicine.  Visit type: Virtual visit with synchronous audio and video  Start time: 10:55  aint: SOB  The patient location is: [address] [place] [address] A  Present with the patient at the time of the telemedicine/virtual assessment:   End time: 11:00  Total time spent with patient: 5 min    Interval History:     4/05 afebrile CRP of 200 on 4/03, On RA satting at 100%.DNR. readay for DC to NH.  4/02 Readyy for DC to SNF No reported events overnight. Maintaining sats ORA. Pt still not very cooperative with exam, he shakes his head no to ROS questions. Per CM, [facility] is not accepting previous skilled nursing patients. Will await updates  Review of Systems:  Patient shakes head "no" when asked about shortness of breath, chest pain, or cough.     Inpatient Medications:    Current Facility-Administered Medications:     0.9%  NaCl infusion, , Intravenous, PRN, Nayeli Orozco, LILIANP    0.9%  NaCl infusion, , Intravenous, Once, SEAN Santos    acetaminophen tablet 650 mg, 650 mg, Oral, Q4H PRN, Charissa Abraham PA-C, 650 mg at 04/04/20 2131    albuterol inhaler 2 puff, 2 puff, Inhalation, Q6H PRN **AND** MDI Q6H PRN, , , Q6H PRN, Ricky Morgan MD    atorvastatin tablet 40 mg, 40 mg, Per G Tube, QHS, Aleja Donovan PA-C, 40 mg at 04/04/20 2129    cinacalcet tablet 60 mg, 60 mg, Oral, Once, LILIAN SantosP    dextromethorphan-guaifenesin  mg/5 ml liquid 10 mL, 10 mL, Oral, Q4H PRN, Charissa Abraham PA-C    dextrose 10% (D10W) Bolus, 12.5 g, Intravenous, PRN, Michael" ALMA Potter MD    dextrose 10% (D10W) Bolus, 25 g, Intravenous, PRN, Michael Potter MD    epoetin la nena-epbx injection 2,700 Units, 50 Units/kg, Intravenous, Every Tues, Thurs, Sat, Nayeli Orozco, FNP    glucagon (human recombinant) injection 1 mg, 1 mg, Intramuscular, PRN, PRATIK Weir-FED[place]    glucose chewable tablet 16 g, 16 g, Oral, PRN, PRATIK Weir-C    glucose chewable tablet 24 g, 24 g, Oral, PRN, Charissa Abraham PA-C    hydrALAZINE tablet 25 mg, 25 mg, Oral, Q8H PRN, Charissa Abraham PA-C    levetiracetam oral soln Soln 250 mg, 250 mg, Per G Tube, BID, Aleja Donovan PA-C, 250 mg at 04/05/20 0945    melatonin tablet 6 mg, 6 mg, Oral, Nightly PRN, PRATIK Weir-[place]ED[place], 6 mg at 03/31/20 2255    metoclopramide HCl tablet 10 mg, 10 mg, Per G Tube, TID AC, Aleja Donovan PA-C, 10 mg at 04/05/20 0516    midodrine tablet 5 mg, 5 mg, Per G Tube, Every Mon, Wed, Fri, Aleja Donovan PA-C, 5 mg at 04/03/20 1700    ondansetron disintegrating tablet 8 mg, 8 mg, Oral, Q8H PRN, Charissa Abraham PA-C    pantoprazole EC tablet 40 mg, 40 mg, Oral, Daily, Ray Brown MD, 40 mg at 04/04/20 2129    promethazine (PHENERGAN) 6.25 mg in dextrose 5 % 50 mL IVPB, 6.25 mg, Intravenous, Q6H PRN, Charissa Abraham PA-C    senna tablet 2 tablet, 2 tablet, Per G Tube, Daily, Aleja Donovan PA-C, 2 tablet at 04/05/20 0945    sevelamer carbonate tablet 800 mg, 800 mg, Oral, BID WM, Rekha Denson PA-C, 800 mg at 04/05/20 0946    sodium chloride 0.9% flush 10 mL, 10 mL, Intravenous, PRN, Michael Potter MD      Physical Exam:      Intake/Output Summary (Last 24 hours) at 4/5/2020 1131  Last data filed at 4/5/2020 0542  Gross per 24 hour   Intake 350 ml   Output --   Net 350 ml     Wt Readings from Last 3 Encounters:   03/28/20 54 kg (119 lb 0.8 oz)   03/25/20 54.4 kg (120 lb)   03/21/20 54.4 kg (120 lb)       BP (!) 145/65 (Patient Position: Lying)   Pulse 100   Temp 97.1 °F (36.2 °C) (Axillary)   Resp 18   Ht  "5' 6" (1.676 m)   Wt 54 kg (119 lb 0.8 oz)   SpO2 100%   BMI 19.21 kg/m²     GEN: NAD, not conversant  Resp: normal work of breathing   CV: no edema  MSK: L BKA  Neuro: awake, alert, will nod yes and no to some questions; not participatory in exam or questioning    Laboratory:  Lab Results   Component Value Date    [id number] Detected (A) 03/25/2020       Recent Labs   Lab 04/01/20  0505 04/02/20  1502 04/03/20  0008   WBC 5.05 6.36 6.96   LYMPH 15.8*  0.8* 13.2*  0.8* 8.9*  0.6*   HGB 8.6* 7.7* 8.0*   HCT 27.6* 24.8* 25.7*    391* 320     Recent Labs   Lab 03/30/20  0009 04/01/20  0505 04/02/20  1502 04/03/20  0008   * 136 136 136   K 3.7 3.6 3.9 3.9   CL 94* 99 98 99   CO2 22* 23 22* 22*   BUN 67* 49* 79* 35*   CREATININE 8.6* 7.4* 9.4* 5.3*   * 162* 192* 152*   CALCIUM 7.8* 8.5* 8.9 9.3   MG 2.0  --  2.0 1.8   PHOS 2.5* 2.2* 1.9* 1.7*     Recent Labs   Lab 03/30/20  0009 04/01/20  0505 04/02/20  1502 04/03/20  0008   ALKPHOS 81 80  --  75   ALT 9* 8*  --  10   AST 36 29  --  24   ALBUMIN 2.2* 2.0* 2.0* 2.1*   PROT 7.5 7.8  --  8.6*   BILITOT 0.4 0.4  --  0.5        Recent Labs     04/03/20  0008   .1*       All labs within the last 24 hours were reviewed.     Microbiology:  Microbiology Results (last 7 days)     Procedure Component Value Units Date/Time    Blood culture [[id number]] Collected:  03/28/20 1407    Order Status:  Completed Specimen:  Blood from Peripheral, Forearm, Left Updated:  04/01/20 1612     Blood Culture, Routine No Growth after 4 days.     Blood culture [[id number]] Collected:  03/28/20 1345    Order Status:  Completed Specimen:  Blood from Peripheral, Antecubital, Left Updated:  04/01/20 1612     Blood Culture, Routine No Growth after 4 days.             Imaging  ECG Results          EKG 12-lead (Final result)  Result time 03/29/20 23:09:47    Final result by Interface, Lab In [facility] (03/29/20 23:09:47)                 Narrative:    Test Reason : " R06.02,    Vent. Rate : 103 BPM     Atrial Rate : 103 BPM     P-R Int : 146 ms          QRS Dur : 098 ms      QT Int : 382 ms       P-R-T Axes : 089 -49 066 degrees     QTc Int : 500 ms    Sinus tachycardia  Right ventricular conduction delay  Left anterior fascicular block  Voltage criteria for left ventricular hypertrophy  Nonspecific T wave abnormality  Abnormal ECG  When compared with ECG of 25-MAR-2020 13:54,  The axis Shifted left  Nonspecific T wave abnormality no longer evident in Inferior leads  Confirmed by Zeke Burns MD ([id number]) on 3/29/2020 11:09:42 PM    Referred By: [email or web address]   SELF           Confirmed By:Zeke Burns MD                              No results found for this or any previous visit.    X-Ray Chest AP Portable  Narrative: EXAMINATION:  XR CHEST AP PORTABLE    CLINICAL HISTORY:  Fever, COVID+;    TECHNIQUE:  Single frontal view of the chest was performed.    COMPARISON:  03/25/2020 and 03/21/2020    FINDINGS:  The patient is rotated on this exam limiting evaluation of the left lung.  Platelike airspace opacities are identified in the left upper and left lower lobes.  No focal airspace consolidation.  No pneumothorax or pleural effusion.  The cardiomediastinal silhouette is normal.    Incidental note is made of a right subclavian vascular stent.  The osseous and soft tissue structures are normal.  Impression: Findings compatible with platelike atelectasis in the left upper and lower lobes.  Overall no significant interval change.    Electronically signed by: Kassy Oro MD  Date:    03/28/2020  Time:    12:38      All imaging within the last 24 hours was reviewed.     Assessment and Plan:    Active Hospital Problems    Diagnosis  POA    *COVID-19 virus infection [U07.1]  Yes    Fever [R50.9]  Yes    ESRD on dialysis [N18.6, Z99.2]  Not Applicable    Seizure [R56.9]  Yes    Chronic blood loss anemia [D50.0]  Yes    Personal history of latent tuberculosis infection [Z86.15]   Yes    Severe malnutrition [E43]  Yes     Assessment and Plan  Severe Malnutrition in the context of Social/Environmental Circumstances     Related to (etiology):  Unknown etiology     Signs and Symptoms (as evidenced by):  Energy Intake: per pt, <75% intake over the last year  Body Fat Depletion: overall subcutaneous fat loss, moderate triceps fat loss and protruding patellas.  Muscle Mass Depletion:  moderate muscle wasting of interosseous, temporal, clavicle, calves and quadriceps  Weight Loss: 24% (39 lbs) x 1 year    Recommendations     Recommendation/Intervention: 1. Should pt be cleared for po diet, recommend renal diet with texture per SLP along with Novasource ONS TID. 2. Should pt require enteral feeding, initiate Novasource renal formula at 10ml/hr and advance 10ml Q4hrs to goal rate of 40ml/hr (provides 1920kcal, 87g pro, 691ml free water). Provide additional 500ml water flush/24 hrs. Hold for residuals >500ml.  Goals: 1. Pt to receive nutrition < 48 hrs.      History of GI bleed [Z87.19]  Not Applicable    Renovascular hypertension [I15.0]  Yes     Chronic    Pulmonary hypertension associated with end-stage renal disease (ESRD) on dialysis [I27.29, N18.6, Z99.2]  Not Applicable     Chronic    Chronic diastolic heart failure [I50.32]  Yes     Chronic     2-23-17    1 - Low normal to mildly depressed left ventricular systolic function (EF 50-55%).     2 - Right ventricular enlargement with mildly depressed systolic function.     3 - Left ventricular diastolic dysfunction.     4 - Right atrial enlargement.     5 - Severe tricuspid regurgitation.     6 - Pulmonary hypertension. The estimated PA systolic pressure is 86 mmHg.     7 - Increased central venous pressure.       Secondary hyperparathyroidism of renal origin [N25.81]  Yes      Resolved Hospital Problems   No resolved problems to display.       Suspected Covid-19 Virus Infection  Person Under Investigation (PUI) for COVID-19  - COVID-19  testing: Collection Date: 3/25/2020 Collection Time:   2:10 PM  - Infection Control notified    - Isolation:   - Airborne and Droplet Precautions  - Surgical mask on patient   - N95 masks must be fit tested, wear eye protection  - 20 second hand hygiene   - Limit visitors per hospital policy   - Consolidate lab draws, nursing care, and interventions    - Diagnostics: (Lymphopenia, hyponatremia, hyperferritinemia, elevated troponin, elevated d-dimer, age, and comorbidities are significant predictors of poor clinical outcome)     - CBC: Hgb 10.1                     trend Q48hrs  - CMP: BUN 44, Cr 6.7           trend Q48hrs  - Procalcitonin: 2.24  - D-dimer: 0.98                                    repeat prior to discharge  - Ferritin: 1762                         repeat prior to discharge   - CRP: 185.1    >132                       trend Q48hrs downtrending  - LDH:  ordered                        repeat prior to discharge  - BNP: ordered  - Troponin: 1.344                      - ECG: estrella acute ischemic changes              - rapid Flu: negative on 3/21/20              - RIP only if BMT/solid transplant:              - Legionella antigen: ordered              - Blood culture x2: NGTD              - Sputum culture: ordered               - CXR: platelike atelectasis in the left upper and lower lobes              - UA and culture: ordered              - CPK: 202    - Management:   Bundle care as able to maintain isolation & minimize in/out of room   - Supplemental O2 to maintain SpO2 92%-96%   if requiring 6L NC or higher, place on nonrebreather and discuss case with MICU   - Telemetry & continuous pulse oximetry    - If wheezing   - albuterol inhaler 2-4 puff Q6hr PRN    - ipratropium daily    - acetaminophen 650mg PO Q6hr PRN fever   - loperamide PRN for viral diarrhea  - Empiric antibiotics per likely source & patient allergies    - CAP: x 5 day course (d/c early if low concern for bacterial co-infection)  Ceftriaxone  1g IV Q24hrs            Azithromycin 500mg IV day #1, then 250mg PO daily x4 days     If azithromycin is not available, start doxycycline                 If MRSA risk factors, add Vancomycin IV (PharmD consult)   - Investigational Treatment Protocol: (if patient meets criteria)   [email or web address]     - statin: atorvastatin 40mg po daily (if CPK WNL)    - start HCQ 400mg PO BID x1 day, then 400mg PO daily x 4 days   (check glucose 6 phosphate dehydrogenase (NOT G6PD Quant), ECG, and start Qshift POCT glucose)    Safety notes:   - Avoid NIPPV (CPAP/BiPAP) to prevent aerosolization, use on a case-by-case basis if in neg pressure room   - Cautious use of NSAIDS for fever per WHO recommendations (3/16/2020)   - No new ACEi/ARB start or discontinuation of chronic med unless hypotensive (Esler et al. Journal of Hypertension 2020, 38:000-000)   - Careful use of steroids in the absence of other indications (shock, ARDS)   - Fluid sparing resuscitation, avoid maintenance fluids     Advance Care Planning  Goals of care, counseling/discussion In light of the patients advanced and life limiting illness,I engaged the the patient's sister, Alicia Retana, in a conversation about the patient's preferences for care  at the very end of life. The patient wishes to have a natural, peaceful death.  Along those lines, the patient does not wish to have CPR or other invasive treatments performed when his heart and/or breathing stops. I communicated to Alicia Retana that a DNR form was completed and will be scanned into EPIC.  I spent a total of 20 minutes engaging the patient in this advance care planning discussion.    Elevated Troponin  Trop 1.3>1.0  EKG no acute ischemic changes  Patient not complaining of CP  Suspect elevated in setting of COVID19 infection, ESRD     Renovascular Hypertension              Pulmonary Hypertension  with ESRD on Dialysis   Continue regular dialysis MWF  Continue home hydralazine prn SBP >170  BP controlled on admission     History of GI bleed   Chronic blood loss anemia   Hgb 10.1 at baseline  Continue home Protonix     Seizures  Continue home Keppra      Severe malnutrition   Nursing home confirmed patient eats orally and receives supplemental Novasource through PEG   Will continue Novasource 50 cc/hr x 10 hrs    VTE High Risk Prophylaxis: enoxaparin 40mg sq QHS @ 2100 (bundled care) if GFR >30    Patient's chronic/stable medical conditions noted in the problem list above will be managed with the patient's home medications as tolerated.       Subsequent Inpatient Hospital CareLevel 3 [id number] Total visit time was 35 minutes or greater with greater than 50% of time spent in counseling and coordination of care.   Ray Brown M.D.

## 2020-04-06 NOTE — PLAN OF CARE
Pt requires total care. Pt not able to perform ADL's. Telly monitoring on going. Pt denies pian and/or discomfort at this time.NAD noted. V/S stable, within normal limits.Pt remains injury and fall free, non skid footwear donned, call light within reach, personal items within reach, bed in low/locked position, pt able to voice needs all needs voiced have been met at this time.

## 2020-04-06 NOTE — CONSULTS
Wound care consult received. Pt positive covid 19. Nurse reports partial thickness skin breakdown to sacrum likely related to shearing.  Nurse applied Criticaid paste and will maintain Pressure injury prevention interventions to include waffle overlay.  Please reconsult for any further needs. [id number]

## 2020-04-06 NOTE — PROGRESS NOTES
"Hospital Medicine  Progress Note  [facility]      Patient Name: Vaughn Retana  MRN:  [id number]  Hospital Medicine Team: VIRTUAL HOSPITAL MEDICINE TEAM 7 Ray Brown MD  Date of Admission:  3/28/2020     Length of Stay:  LOS: 2 days       Principal Problem:  COVID-19 virus infection      Hospital Course:  Patient admitted from NH for of COVID-19. Has maintained sats ORA       This service was provided through telemedicine.  Visit type: Virtual visit with synchronous audio and video  Start time: 10:55  aint: SOB  The patient location is: [address] [place] [address] A  Present with the patient at the time of the telemedicine/virtual assessment:   End time: 11:00  Total time spent with patient: 5 min    Interval History:    4/06 Ready for DC to SNF. Stable.4/05 afebrile CRP of 200 on 4/03, On RA satting at 100%.DNR. readay for DC to NH.  4/02 Readyy for DC to SNF No reported events overnight. Maintaining sats ORA. Pt still not very cooperative with exam, he shakes his head no to ROS questions. Per CM, [facility] is not accepting previous [facility] patients. Will await updates  Review of Systems:  Patient shakes head "no" when asked about shortness of breath, chest pain, or cough.     Inpatient Medications:    Current Facility-Administered Medications:     0.9%  NaCl infusion, , Intravenous, PRN, Nayeli Orozco, FNP    0.9%  NaCl infusion, , Intravenous, Once, Nayeli Orozco, FNP    0.9%  NaCl infusion, , Intravenous, PRN, Nayeli Orozco, FNP    0.9%  NaCl infusion, , Intravenous, Once, Nayeli Orozco, FNP    acetaminophen tablet 650 mg, 650 mg, Oral, Q4H PRN, Charissa Abraham PA-C, 650 mg at 04/04/20 2131    albuterol inhaler 2 puff, 2 puff, Inhalation, Q6H PRN **AND** MDI Q6H PRN, , , Q6H PRN, Ricky Morgan MD    atorvastatin tablet 40 mg, 40 mg, Per G Tube, QHS, Aleja Donovan PA-C, 40 mg at 04/05/20 2111    cinacalcet tablet 60 mg, 60 mg, Oral, Once, Nayeli MINA. " SEAN Orozco    dextromethorphan-guaifenesin  mg/5 ml liquid 10 mL, 10 mL, Oral, Q4H PRN, Charissa Abraham PA-C    dextrose 10% (D10W) Bolus, 12.5 g, Intravenous, PRN, Michael Potter MD    dextrose 10% (D10W) Bolus, 25 g, Intravenous, PRN, Micheal Potter MD    epoetin la nena-epbx injection 2,700 Units, 50 Units/kg, Intravenous, Every Tues, Thurs, Sat, SEAN Santos    glucagon (human recombinant) injection 1 mg, 1 mg, Intramuscular, PRN, PRATIK Weir-FEDERICA    glucose chewable tablet 16 g, 16 g, Oral, PRN, PRATIK Weir-C    glucose chewable tablet 24 g, 24 g, Oral, PRN, Charissa Abraham PA-C    hydrALAZINE tablet 25 mg, 25 mg, Oral, Q8H PRN, Charissa Abraham PA-C    levetiracetam oral soln Soln 250 mg, 250 mg, Per G Tube, BID, PRATIK Archuleta-C, 250 mg at 04/06/20 0935    melatonin tablet 6 mg, 6 mg, Oral, Nightly PRN, PRATIK Weir-C, 6 mg at 03/31/20 2255    metoclopramide HCl tablet 10 mg, 10 mg, Per G Tube, TID AC, PRATIK Archuleta-C, 10 mg at 04/06/20 0604    midodrine tablet 5 mg, 5 mg, Per G Tube, Every Mon, Wed, Fri, PRATIK Archuleta-C, 5 mg at 04/03/20 1700    ondansetron disintegrating tablet 8 mg, 8 mg, Oral, Q8H PRN, PRATIK Weir-FEDERICA    pantoprazole EC tablet 40 mg, 40 mg, Oral, Daily, Ray Brown MD, 40 mg at 04/05/20 2111    promethazine (PHENERGAN) 6.25 mg in dextrose 5 % 50 mL IVPB, 6.25 mg, Intravenous, Q6H PRN, PRATIK Weir-FEDERICA    senna tablet 2 tablet, 2 tablet, Per G Tube, Daily, Aleja Donovan PA-C, 2 tablet at 04/06/20 0934    sevelamer carbonate tablet 800 mg, 800 mg, Oral, BID WM, Rekha Denson PA-C, 800 mg at 04/06/20 0933    sodium chloride 0.9% flush 10 mL, 10 mL, Intravenous, PRN, Michael Potter MD      Physical Exam:      Intake/Output Summary (Last 24 hours) at 4/6/2020 1230  Last data filed at 4/6/2020 0600  Gross per 24 hour   Intake 620 ml   Output --   Net 620 ml     Wt Readings from Last 3 Encounters:   03/28/20 54 kg  "(119 lb 0.8 oz)   03/25/20 54.4 kg (120 lb)   03/21/20 54.4 kg (120 lb)       BP (!) 152/92 (BP Location: Left arm, Patient Position: Lying)   Pulse 100   Temp 97.2 °F (36.2 °C) (Oral)   Resp 16   Ht 5' 6" (1.676 m)   Wt 54 kg (119 lb 0.8 oz)   SpO2 99%   BMI 19.21 kg/m²     GEN: NAD, not conversant  Resp: normal work of breathing   CV: no edema  MSK: L BKA  Neuro: awake, alert, will nod yes and no to some questions; not participatory in exam or questioning    Laboratory:  Lab Results   Component Value Date    [id number] Detected (A) 03/25/2020       Recent Labs   Lab 04/01/20  0505 04/02/20  1502 04/03/20  0008   WBC 5.05 6.36 6.96   LYMPH 15.8*  0.8* 13.2*  0.8* 8.9*  0.6*   HGB 8.6* 7.7* 8.0*   HCT 27.6* 24.8* 25.7*    391* 320     Recent Labs   Lab 04/01/20  0505 04/02/20  1502 04/03/20  0008    136 136   K 3.6 3.9 3.9   CL 99 98 99   CO2 23 22* 22*   BUN 49* 79* 35*   CREATININE 7.4* 9.4* 5.3*   * 192* 152*   CALCIUM 8.5* 8.9 9.3   MG  --  2.0 1.8   PHOS 2.2* 1.9* 1.7*     Recent Labs   Lab 04/01/20  0505 04/02/20  1502 04/03/20  0008   ALKPHOS 80  --  75   ALT 8*  --  10   AST 29  --  24   ALBUMIN 2.0* 2.0* 2.1*   PROT 7.8  --  8.6*   BILITOT 0.4  --  0.5        No results for input(s): DDIMER, FERRITIN, CRP, LDH, BNP, TROPONINI, CPK in the last 72 hours.    Invalid input(s): PROCALCITONIN    All labs within the last 24 hours were reviewed.     Microbiology:  Microbiology Results (last 7 days)     Procedure Component Value Units Date/Time    Blood culture [[id number]] Collected:  03/28/20 1407    Order Status:  Completed Specimen:  Blood from Peripheral, Forearm, Left Updated:  04/01/20 1612     Blood Culture, Routine No Growth after 4 days.     Blood culture [[id number]] Collected:  03/28/20 1345    Order Status:  Completed Specimen:  Blood from Peripheral, Antecubital, Left Updated:  04/01/20 1612     Blood Culture, Routine No Growth after 4 days.             Imaging  ECG " Results          EKG 12-lead (Final result)  Result time 03/29/20 23:09:47    Final result by Interface, Lab In [facility] (03/29/20 23:09:47)                 Narrative:    Test Reason : R06.02,    Vent. Rate : 103 BPM     Atrial Rate : 103 BPM     P-R Int : 146 ms          QRS Dur : 098 ms      QT Int : 382 ms       P-R-T Axes : 089 -49 066 degrees     QTc Int : 500 ms    Sinus tachycardia  Right ventricular conduction delay  Left anterior fascicular block  Voltage criteria for left ventricular hypertrophy  Nonspecific T wave abnormality  Abnormal ECG  When compared with ECG of 25-MAR-2020 13:54,  The axis Shifted left  Nonspecific T wave abnormality no longer evident in Inferior leads  Confirmed by Zeke Burns MD ([id number]) on 3/29/2020 11:09:42 PM    Referred By: KHARI   SELF           Confirmed By:Zeke Burns MD                              No results found for this or any previous visit.    X-Ray Chest AP Portable  Narrative: EXAMINATION:  XR CHEST AP PORTABLE    CLINICAL HISTORY:  Fever, COVID+;    TECHNIQUE:  Single frontal view of the chest was performed.    COMPARISON:  03/25/2020 and 03/21/2020    FINDINGS:  The patient is rotated on this exam limiting evaluation of the left lung.  Platelike airspace opacities are identified in the left upper and left lower lobes.  No focal airspace consolidation.  No pneumothorax or pleural effusion.  The cardiomediastinal silhouette is normal.    Incidental note is made of a right subclavian vascular stent.  The osseous and soft tissue structures are normal.  Impression: Findings compatible with platelike atelectasis in the left upper and lower lobes.  Overall no significant interval change.    Electronically signed by: Kassy Oro MD  Date:    03/28/2020  Time:    12:38      All imaging within the last 24 hours was reviewed.     Assessment and Plan:    Active Hospital Problems    Diagnosis  POA    *COVID-19 virus infection [U07.1]  Yes    Fever [R50.9]  Yes     ESRD on dialysis [N18.6, Z99.2]  Not Applicable    Seizure [R56.9]  Yes    Chronic blood loss anemia [D50.0]  Yes    Personal history of latent tuberculosis infection [Z86.15]  Yes    Severe malnutrition [E43]  Yes     Assessment and Plan  Severe Malnutrition in the context of Social/Environmental Circumstances     Related to (etiology):  Unknown etiology     Signs and Symptoms (as evidenced by):  Energy Intake: per pt, <75% intake over the last year  Body Fat Depletion: overall subcutaneous fat loss, moderate triceps fat loss and protruding patellas.  Muscle Mass Depletion:  moderate muscle wasting of interosseous, temporal, clavicle, calves and quadriceps  Weight Loss: 24% (39 lbs) x 1 year    Recommendations     Recommendation/Intervention: 1. Should pt be cleared for po diet, recommend renal diet with texture per SLP along with Novasource ONS TID. 2. Should pt require enteral feeding, initiate Novasource renal formula at 10ml/hr and advance 10ml Q4hrs to goal rate of 40ml/hr (provides 1920kcal, 87g pro, 691ml free water). Provide additional 500ml water flush/24 hrs. Hold for residuals >500ml.  Goals: 1. Pt to receive nutrition < 48 hrs.      History of GI bleed [Z87.19]  Not Applicable    Renovascular hypertension [I15.0]  Yes     Chronic    Pulmonary hypertension associated with end-stage renal disease (ESRD) on dialysis [I27.29, N18.6, Z99.2]  Not Applicable     Chronic    Chronic diastolic heart failure [I50.32]  Yes     Chronic     2-23-17    1 - Low normal to mildly depressed left ventricular systolic function (EF 50-55%).     2 - Right ventricular enlargement with mildly depressed systolic function.     3 - Left ventricular diastolic dysfunction.     4 - Right atrial enlargement.     5 - Severe tricuspid regurgitation.     6 - Pulmonary hypertension. The estimated PA systolic pressure is 86 mmHg.     7 - Increased central venous pressure.       Secondary hyperparathyroidism of renal origin  [N25.81]  Yes      Resolved Hospital Problems   No resolved problems to display.       Suspected Covid-19 Virus Infection  Person Under Investigation (PUI) for COVID-19  - COVID-19 testing: Collection Date: 3/25/2020 Collection Time:   2:10 PM  - Infection Control notified    - Isolation:   - Airborne and Droplet Precautions  - Surgical mask on patient   - N95 masks must be fit tested, wear eye protection  - 20 second hand hygiene   - Limit visitors per hospital policy   - Consolidate lab draws, nursing care, and interventions    - Diagnostics: (Lymphopenia, hyponatremia, hyperferritinemia, elevated troponin, elevated d-dimer, age, and comorbidities are significant predictors of poor clinical outcome)     - CBC: Hgb 10.1                     trend Q48hrs  - CMP: BUN 44, Cr 6.7           trend Q48hrs  - Procalcitonin: 2.24  - D-dimer: 0.98                                    repeat prior to discharge  - Ferritin: 1762                         repeat prior to discharge   - CRP: 185.1    >132                       trend Q48hrs downtrending  - LDH:  ordered                        repeat prior to discharge  - BNP: ordered  - Troponin: 1.344                      - ECG: estrella acute ischemic changes              - rapid Flu: negative on 3/21/20              - RIP only if BMT/solid transplant:              - Legionella antigen: ordered              - Blood culture x2: NGTD              - Sputum culture: ordered               - CXR: platelike atelectasis in the left upper and lower lobes              - UA and culture: ordered              - CPK: 202    - Management:   Bundle care as able to maintain isolation & minimize in/out of room   - Supplemental O2 to maintain SpO2 92%-96%   if requiring 6L NC or higher, place on nonrebreather and discuss case with MICU   - Telemetry & continuous pulse oximetry    - If wheezing   - albuterol inhaler 2-4 puff Q6hr PRN    - ipratropium daily    - acetaminophen 650mg PO Q6hr PRN fever   -  loperamide PRN for viral diarrhea  - Empiric antibiotics per likely source & patient allergies    - CAP: x 5 day course (d/c early if low concern for bacterial co-infection)  Ceftriaxone 1g IV Q24hrs            Azithromycin 500mg IV day #1, then 250mg PO daily x4 days     If azithromycin is not available, start doxycycline                 If MRSA risk factors, add Vancomycin IV (PharmD consult)   - Investigational Treatment Protocol: (if patient meets criteria)   [email or web address]     - statin: atorvastatin 40mg po daily (if CPK WNL)    - start HCQ 400mg PO BID x1 day, then 400mg PO daily x 4 days   (check glucose 6 phosphate dehydrogenase (NOT G6PD Quant), ECG, and start Qshift POCT glucose)    Safety notes:   - Avoid NIPPV (CPAP/BiPAP) to prevent aerosolization, use on a case-by-case basis if in neg pressure room   - Cautious use of NSAIDS for fever per WHO recommendations (3/16/2020)   - No new ACEi/ARB start or discontinuation of chronic med unless hypotensive (Esler et al. Journal of Hypertension 2020, 38:000-000)   - Careful use of steroids in the absence of other indications (shock, ARDS)   - Fluid sparing resuscitation, avoid maintenance fluids     Advance Care Planning  Goals of care, counseling/discussion In light of the patients advanced and life limiting illness,I engaged the the patient's sister, Alicia Retana, in a conversation about the patient's preferences for care  at the very end of life. The patient wishes to have a natural, peaceful death.  Along those lines, the patient does not wish to have CPR or other invasive treatments performed when his heart and/or breathing stops. I communicated to Alicia Retana that a DNR form was completed and will be scanned into EPIC.  I spent a total of 20 minutes engaging the patient in this advance care planning discussion.    Elevated Troponin  Trop  1.3>1.0  EKG no acute ischemic changes  Patient not complaining of CP  Suspect elevated in setting of COVID19 infection, ESRD     Renovascular Hypertension              Pulmonary Hypertension with ESRD on Dialysis   Continue regular dialysis MWF  Continue home hydralazine prn SBP >170  BP controlled on admission     History of GI bleed   Chronic blood loss anemia   Hgb 10.1 at baseline  Continue home Protonix     Seizures  Continue home Keppra      Severe malnutrition   Nursing home confirmed patient eats orally and receives supplemental Novasource through PEG   Will continue Novasource 50 cc/hr x 10 hrs    VTE High Risk Prophylaxis: enoxaparin 40mg sq QHS @ 2100 (bundled care) if GFR >30    Patient's chronic/stable medical conditions noted in the problem list above will be managed with the patient's home medications as tolerated.       Subsequent Inpatient Hospital CareLevel 3 [id number] Total visit time was 35 minutes or greater with greater than 50% of time spent in counseling and coordination of care.   Ray Brown M.D.

## 2020-04-06 NOTE — PROGRESS NOTES
Hemodialysis tx initiated via RICHARD AVF, tolerated well, flows good. UF goal 2.5L, isolation status maintained d/t COVID-19

## 2020-04-06 NOTE — PROGRESS NOTES
Hemodialysis Note  Pt seen and evaluated while undergoing dialysis. Tolerating dialysis with current UFR, without complications. Labs reviewed and dialysate bath adjusted.     BFR: 400  UF goal: 1-2L as tolerated    Hgb 8.0 (and has trended down from 10.2 3/25/20)  PMHx: GIB  Ferritin 1762 3/28/20  Hold epogen  Managed by Hospital Medicine    Corrected Ca+ 10.8  -adjusted in dialysate bath  Ph- 1.7  -d/c sevelamer carbonate  On renal  -receiving [facility] with tube feeds    BP 94/52  -on midodrine MWF at 1700  -paged Primary Team

## 2020-04-06 NOTE — PLAN OF CARE
Problem: Adult Inpatient Plan of Care  Goal: Plan of Care Review  Outcome: Ongoing, Progressing   Recommendations     1. Increase Novasource renal to 60ml/hr x 12 hours to provide 1440 kcal, 65g pro, 518ml free water. Additional 50ml water flush Q6hrs or per MD. Hold for residuals>500ml. 2. Continue renal diet as tolerated. 3. Add Beneprotein TID.  Goals: meet % EEN/EPN by RD follow-up  Nutrition Goal Status: progressing towards goal  Communication of RD Recs: (POC)

## 2020-04-06 NOTE — PROGRESS NOTES
"[facility]  Adult Nutrition  Progress Note    SUMMARY       Recommendations    1. Increase Novasource renal to 60ml/hr x 12 hours to provide 1440 kcal, 65g pro, 518ml free water. Additional 50ml water flush Q6hrs or per MD. Hold for residuals>500ml. 2. Continue renal diet as tolerated. 3. Add Beneprotein TID.  Goals: meet % EEN/EPN by RD follow-up  Nutrition Goal Status: progressing towards goal  Communication of RD Recs: (POC)    Reason for Assessment    Reason For Assessment: RD follow-up  Diagnosis: (COVID-19 virus infection)  Relevant Medical History: HTN, intracranial bleed, ESRD on dialysis, L BKA, DVT, heart failure, hx upper GI bleeds, PEG status, seizures, latent TB  Interdisciplinary Rounds: did not attend  General Information Comments: Remote assessment completed. Unab;e to reach pt or nurse. Pt with h/o severe malnutrition per NFPE 2/14/20 continues with low BMI. Pt is NHR and is s/p PEG 2/12/20, has been on TFs since then. Unable to complete NFPE due to COVID-19 restrictions, but pt likely continues with moderate to severe muscle wasting. However, pt's wt has been stable since last admission and TF intake likely adequate, so pt does not currently meet criteria for severe malnutrition at this time. Will monitor TF intake and wts for further s/s malnutrition.  Nutrition Discharge Planning: Pt to discharge on TF with Novasource renal @50ml/hr as tolerated.    Nutrition Risk Screen    Nutrition Risk Screen: tube feeding or parenteral nutrition    Nutrition/Diet History    Spiritual, Cultural Beliefs, Oriental orthodox Practices, Values that Affect Care: no  Factors Affecting Nutritional Intake: decreased appetite  Nutrition Support Provision Prior to Admit: pt with PEG, likely with TF support PTA    Anthropometrics    Temp: 97.2 °F (36.2 °C)  Height: 5' 6" (167.6 cm)  Height (inches): 66 in  Weight Method: Bed Scale  Weight: 54 kg (119 lb 0.8 oz)  Weight (lb): 119.05 lb  Ideal Body Weight " (IBW), Male: 142 lb  % Ideal Body Weight, Male (lb): 83.84 %  BMI (Calculated): 19.2  BMI Grade: 18.5-24.9 - normal  Weight Loss: unintentional  Usual Body Weight (UBW), k.5 kg(per chart review 19)  % Usual Body Weight: 94.11  % Weight Change From Usual Weight: -6.09 %       Lab/Procedures/Meds    Pertinent Labs Reviewed: reviewed  Pertinent Labs Comments: BUN 35, Creat 5.3, Glu 152, Phos 1.7, A1c 4.7%  Pertinent Medications Reviewed: reviewed  Pertinent Medications Comments: IVF, reglan, pantoprazole, senna, sevelamer carbonate      Estimated/Assessed Needs    Weight Used For Calorie Calculations: 54 kg (119 lb 0.8 oz)  Energy Calorie Requirements (kcal): 1646 kcal/day  Energy Need Method: [place]-St Jeor(x 1.25)  Protein Requirements: 64 gm/day(>1.2 gm/kg)  Weight Used For Protein Calculations: 54 kg (119 lb 0.8 oz)  Fluid Requirements (mL): 1 mL/kcal or per MD     RDA Method (mL): 1646         Nutrition Prescription Ordered    Current Diet Order: Renal   Current Nutrition Support Formula Ordered: NovasGelSight Renal  Current Nutrition Support Rate Ordered: 50 (ml)  Current Nutrition Support Frequency Ordered: mL/hr x 10 hrs    Evaluation of Received Nutrient/Fluid Intake    Enteral Calories (kcal): 1000  Enteral Protein (gm): 45  Enteral (Free Water) Fluid (mL): 359  % Kcal Needs: 61  % Protein Needs: 64  Energy Calories Required: not meeting needs  Protein Required: not meeting needs  Fluid Required: (per MD)  Comments: LBM 4  Tolerance: tolerating  % Intake of Estimated Energy Needs: 50 - 75 %  % Meal Intake: 0 - 25 %    Nutrition Risk    Level of Risk/Frequency of Follow-up: low(f/u 1 x wk)     Assessment and Plan    Nutrition Problem  Increased nutrient needs    Related to (etiology):   Previous diagnosis of severe malnutrition    Signs and Symptoms (as evidenced by):   Pt with severe malnutrition per NFPE 20 has stable wt and tube feedings at NH since discharge but most likely continues with  moderate to severe muscle wasting.    Interventions(treatment strategy):  Collaboration of care with providers.  Enteral nutrition: Novasource @50ml/hr x 10 hrs    Nutrition Diagnosis Status:   New       Monitor and Evaluation    Food and Nutrient Intake: energy intake, food and beverage intake, enteral nutrition intake  Food and Nutrient Adminstration: diet order, enteral and parenteral nutrition administration  Physical Activity and Function: nutrition-related ADLs and IADLs  Anthropometric Measurements: weight, weight change, body mass index  Biochemical Data, Medical Tests and Procedures: electrolyte and renal panel, gastrointestinal profile, glucose/endocrine profile, inflammatory profile, lipid profile  Nutrition-Focused Physical Findings: overall appearance     Malnutrition Assessment     Unable to complete NFPE due to COVID-19 restrictions.    Nutrition Follow-Up    RD Follow-up?: Yes

## 2020-04-06 NOTE — PROGRESS NOTES
Hemodialysis tx complete, 620ml removed in a tx of 2.5 hours, tolerated well. Blood returned via RICHARD AVF, 15g needles removed x2, gauze and tape applied, pressure held to each site for 10 minutes, hemostasis achieved   Eucrisa Counseling: Patient may experience a mild burning sensation during topical application. Eucrisa is not approved in children less than 2 years of age.

## 2020-04-06 NOTE — PLAN OF CARE
Pt is a resident of [facility] and unable to return to NH d/t Covid +. Pt also a dialysis pt. Pt is on the list for [facility] Covid unit - awaiting for the additional 14 beds to open. CM has the arrangements complete for HD chair - home unit is [facility] but pt will have to have dialysis at isolation unit [facility]. CM also forwarded a referral to [facility] for Covid pt's.    Wknd RUSTY SALINAS [phone] - assisting 7th floor 4/6 04/06/20 1023   Discharge Reassessment   Assessment Type Discharge Planning Reassessment   Discharge Plan A New Nursing Home placement - [facility] care facility   Discharge Plan B Return to Nursing Home   Anticipated Discharge Disposition retirement [facility]   Post-Acute Status   Post-Acute Authorization Placement   Post-Acute Placement Status Pending Bed Availability   Discharge Delays (!) Other  (need accepting facility)

## 2020-04-07 NOTE — PLAN OF CARE
Plan of care discussed with patient. Patient is free of fall/trauma/injury. Patient has been nonverbal, but no signs of SOB or pain/discomfort. Patient to HD today. Will continue to monitor.

## 2020-04-07 NOTE — PLAN OF CARE
Afebrile. Free from falls or injury. No complaints of pain. Meds crushed and given through peg tube. Bolus feeding given. No continuous bags available. Bed locked in lowest position, non skid socks on, call light within reach. Pt instructed to call if any assistance is needed. Vitals stable. Will cont to mehdi pt.

## 2020-04-07 NOTE — PROGRESS NOTES
"Hospital Medicine  Progress Note  [facility]      Patient Name: Vaughn Retana  MRN:  [id number]  Hospital Medicine Team: VIRTUAL HOSPITAL MEDICINE TEAM 7 Ray Brown MD  Date of Admission:  3/28/2020     Length of Stay:  LOS: 3 days       Principal Problem:  COVID-19 virus infection      Hospital Course:  Patient admitted from NH for of COVID-19. Has maintained sats ORA       This service was provided through telemedicine.  Visit type: Virtual visit with synchronous audio and video  Start time: 11:45  aint: SOB  The patient location is: [address] [place] [address] A  Present with the patient at the time of the telemedicine/virtual assessment:   End time: 11:50  Total time spent with patient: 5 min    Interval History:    4/07 stable, no recent labs, Afebrile on RA. Awai tplacement.  4/06 Ready for DC to SNF. Stable.4/05 afebrile CRP of 200 on 4/03, On RA satting at 100%.DNR. readay for DC to NH.  4/02 Ready for DC to SNF No reported events overnight. Maintaining sats ORA. Pt still not very cooperative with exam, he shakes his head no to ROS questions. Per CM, [facility] is not accepting previous correction patients. Will await updates  Review of Systems:  Patient shakes head "no" when asked about shortness of breath, chest pain, or cough.     Inpatient Medications:    Current Facility-Administered Medications:     0.9%  NaCl infusion, , Intravenous, PRN, Nayeli Orozco, FNP    0.9%  NaCl infusion, , Intravenous, Once, Nayeli Orozco, FNP    0.9%  NaCl infusion, , Intravenous, PRN, Nayeli Orozco, FNP    acetaminophen tablet 650 mg, 650 mg, Oral, Q4H PRN, Charissa Abraham PA-C, 650 mg at 04/04/20 2131    albuterol inhaler 2 puff, 2 puff, Inhalation, Q6H PRN **AND** MDI Q6H PRN, , , Q6H PRN, Ricky Morgan MD    atorvastatin tablet 40 mg, 40 mg, Per G Tube, QHS, Aleja Donovan PA-C, 40 mg at 04/06/20 2326    cinacalcet tablet 60 mg, 60 mg, Oral, Once, Nayeli Orozco, LILIANP    " "dextromethorphan-guaifenesin  mg/5 ml liquid 10 mL, 10 mL, Oral, Q4H PRN, Charissa Abraham PA-C    dextrose 10% (D10W) Bolus, 12.5 g, Intravenous, PRN, Michael Potter MD    dextrose 10% (D10W) Bolus, 25 g, Intravenous, PRN, Michael Potter MD    glucagon (human recombinant) injection 1 mg, 1 mg, Intramuscular, PRN, PRATIK Weir-C    glucose chewable tablet 16 g, 16 g, Oral, PRN, Charissa Abraham PA-C    glucose chewable tablet 24 g, 24 g, Oral, PRN, PRATIK Weir-C    hydrALAZINE tablet 25 mg, 25 mg, Oral, Q8H PRN, Charissa Abraham PA-C    levetiracetam oral soln Soln 250 mg, 250 mg, Per G Tube, BID, Aleja Donovan PA-C, 250 mg at 04/06/20 2325    melatonin tablet 6 mg, 6 mg, Oral, Nightly PRN, PRATIK Weir[place], 6 mg at 03/31/20 2255    metoclopramide HCl tablet 10 mg, 10 mg, Per G Tube, TID AC, Aleja Donovan PA-C, 10 mg at 04/07/20 1007    midodrine tablet 5 mg, 5 mg, Per G Tube, Every Mon, Wed, Fri, Aleja Donovan PA-C, 5 mg at 04/03/20 1700    ondansetron disintegrating tablet 8 mg, 8 mg, Oral, Q8H PRN, Charissa Abraham PA-C    pantoprazole EC tablet 40 mg, 40 mg, Oral, Daily, Ray Brown MD, 40 mg at 04/06/20 2326    promethazine (PHENERGAN) 6.25 mg in dextrose 5 % 50 mL IVPB, 6.25 mg, Intravenous, Q6H PRN, Charissa Abraham PA-C    senna tablet 2 tablet, 2 tablet, Per G Tube, Daily, Aleja Donovan PA-C, 2 tablet at 04/07/20 1007    sodium chloride 0.9% flush 10 mL, 10 mL, Intravenous, PRN, Michael Potter MD      Physical Exam:      Intake/Output Summary (Last 24 hours) at 4/7/2020 1151  Last data filed at 4/6/2020 1730  Gross per 24 hour   Intake 590 ml   Output 1120 ml   Net -530 ml     Wt Readings from Last 3 Encounters:   03/28/20 54 kg (119 lb 0.8 oz)   03/25/20 54.4 kg (120 lb)   03/21/20 54.4 kg (120 lb)       /85 (Patient Position: Lying)   Pulse 110   Temp 98.8 °F (37.1 °C) (Oral)   Resp 20   Ht 5' 6" (1.676 m)   Wt 54 kg (119 lb 0.8 oz)   SpO2 100%   BMI " 19.21 kg/m²     GEN: NAD, not conversant  Resp: normal work of breathing   CV: no edema  MSK: L BKA  Neuro: awake, alert, will nod yes and no to some questions; not participatory in exam or questioning    Laboratory:  Lab Results   Component Value Date    [id number] Detected (A) 03/25/2020       Recent Labs   Lab 04/01/20  0505 04/02/20  1502 04/03/20  0008   WBC 5.05 6.36 6.96   LYMPH 15.8*  0.8* 13.2*  0.8* 8.9*  0.6*   HGB 8.6* 7.7* 8.0*   HCT 27.6* 24.8* 25.7*    391* 320     Recent Labs   Lab 04/01/20  0505 04/02/20  1502 04/03/20  0008    136 136   K 3.6 3.9 3.9   CL 99 98 99   CO2 23 22* 22*   BUN 49* 79* 35*   CREATININE 7.4* 9.4* 5.3*   * 192* 152*   CALCIUM 8.5* 8.9 9.3   MG  --  2.0 1.8   PHOS 2.2* 1.9* 1.7*     Recent Labs   Lab 04/01/20  0505 04/02/20  1502 04/03/20  0008   ALKPHOS 80  --  75   ALT 8*  --  10   AST 29  --  24   ALBUMIN 2.0* 2.0* 2.1*   PROT 7.8  --  8.6*   BILITOT 0.4  --  0.5        No results for input(s): DDIMER, FERRITIN, CRP, LDH, BNP, TROPONINI, CPK in the last 72 hours.    Invalid input(s): PROCALCITONIN    All labs within the last 24 hours were reviewed.     Microbiology:  Microbiology Results (last 7 days)     Procedure Component Value Units Date/Time    Blood culture [[id number]] Collected:  03/28/20 1407    Order Status:  Completed Specimen:  Blood from Peripheral, Forearm, Left Updated:  04/01/20 1612     Blood Culture, Routine No Growth after 4 days.     Blood culture [[id number]] Collected:  03/28/20 1345    Order Status:  Completed Specimen:  Blood from Peripheral, Antecubital, Left Updated:  04/01/20 1612     Blood Culture, Routine No Growth after 4 days.             Imaging  ECG Results          EKG 12-lead (Final result)  Result time 03/29/20 23:09:47    Final result by Interface, Lab In [facility] (03/29/20 23:09:47)                 Narrative:    Test Reason : R06.02,    Vent. Rate : 103 BPM     Atrial Rate : 103 BPM     P-R Int : 146 ms           QRS Dur : 098 ms      QT Int : 382 ms       P-R-T Axes : 089 -49 066 degrees     QTc Int : 500 ms    Sinus tachycardia  Right ventricular conduction delay  Left anterior fascicular block  Voltage criteria for left ventricular hypertrophy  Nonspecific T wave abnormality  Abnormal ECG  When compared with ECG of 25-MAR-2020 13:54,  The axis Shifted left  Nonspecific T wave abnormality no longer evident in Inferior leads  Confirmed by Zeke Burns MD ([id number]) on 3/29/2020 11:09:42 PM    Referred By: [email or web address]   SELF           Confirmed By:Zeke Burns MD                              No results found for this or any previous visit.    X-Ray Chest AP Portable  Narrative: EXAMINATION:  XR CHEST AP PORTABLE    CLINICAL HISTORY:  Fever, COVID+;    TECHNIQUE:  Single frontal view of the chest was performed.    COMPARISON:  03/25/2020 and 03/21/2020    FINDINGS:  The patient is rotated on this exam limiting evaluation of the left lung.  Platelike airspace opacities are identified in the left upper and left lower lobes.  No focal airspace consolidation.  No pneumothorax or pleural effusion.  The cardiomediastinal silhouette is normal.    Incidental note is made of a right subclavian vascular stent.  The osseous and soft tissue structures are normal.  Impression: Findings compatible with platelike atelectasis in the left upper and lower lobes.  Overall no significant interval change.    Electronically signed by: Kassy Oro MD  Date:    03/28/2020  Time:    12:38      All imaging within the last 24 hours was reviewed.     Assessment and Plan:    Active Hospital Problems    Diagnosis  POA    *COVID-19 virus infection [U07.1]  Yes    Fever [R50.9]  Yes    ESRD on dialysis [N18.6, Z99.2]  Not Applicable    Seizure [R56.9]  Yes    Chronic blood loss anemia [D50.0]  Yes    Personal history of latent tuberculosis infection [Z86.15]  Yes    Severe malnutrition [E43]  Yes     Assessment and Plan  Severe Malnutrition in  the context of Social/Environmental Circumstances     Related to (etiology):  Unknown etiology     Signs and Symptoms (as evidenced by):  Energy Intake: per pt, <75% intake over the last year  Body Fat Depletion: overall subcutaneous fat loss, moderate triceps fat loss and protruding patellas.  Muscle Mass Depletion:  moderate muscle wasting of interosseous, temporal, clavicle, calves and quadriceps  Weight Loss: 24% (39 lbs) x 1 year    Recommendations     Recommendation/Intervention: 1. Should pt be cleared for po diet, recommend renal diet with texture per SLP along with Novasource ONS TID. 2. Should pt require enteral feeding, initiate Novasource renal formula at 10ml/hr and advance 10ml Q4hrs to goal rate of 40ml/hr (provides 1920kcal, 87g pro, 691ml free water). Provide additional 500ml water flush/24 hrs. Hold for residuals >500ml.  Goals: 1. Pt to receive nutrition < 48 hrs.      History of GI bleed [Z87.19]  Not Applicable    Renovascular hypertension [I15.0]  Yes     Chronic    Pulmonary hypertension associated with end-stage renal disease (ESRD) on dialysis [I27.29, N18.6, Z99.2]  Not Applicable     Chronic    Chronic diastolic heart failure [I50.32]  Yes     Chronic     2-23-17    1 - Low normal to mildly depressed left ventricular systolic function (EF 50-55%).     2 - Right ventricular enlargement with mildly depressed systolic function.     3 - Left ventricular diastolic dysfunction.     4 - Right atrial enlargement.     5 - Severe tricuspid regurgitation.     6 - Pulmonary hypertension. The estimated PA systolic pressure is 86 mmHg.     7 - Increased central venous pressure.       Secondary hyperparathyroidism of renal origin [N25.81]  Yes      Resolved Hospital Problems   No resolved problems to display.       Suspected Covid-19 Virus Infection  Person Under Investigation (PUI) for COVID-19  - COVID-19 testing: Collection Date: 3/25/2020 Collection Time:   2:10 PM  - Infection Control  notified    - Isolation:   - Airborne and Droplet Precautions  - Surgical mask on patient   - N95 masks must be fit tested, wear eye protection  - 20 second hand hygiene   - Limit visitors per hospital policy   - Consolidate lab draws, nursing care, and interventions    - Diagnostics: (Lymphopenia, hyponatremia, hyperferritinemia, elevated troponin, elevated d-dimer, age, and comorbidities are significant predictors of poor clinical outcome)     - CBC: Hgb 10.1                     trend Q48hrs  - CMP: BUN 44, Cr 6.7           trend Q48hrs  - Procalcitonin: 2.24  - D-dimer: 0.98                                    repeat prior to discharge  - Ferritin: 1762                         repeat prior to discharge   - CRP: 185.1    >132                       trend Q48hrs downtrending  - LDH:  ordered                        repeat prior to discharge  - BNP: ordered  - Troponin: 1.344                      - ECG: estrella acute ischemic changes              - rapid Flu: negative on 3/21/20              - RIP only if BMT/solid transplant:              - Legionella antigen: ordered              - Blood culture x2: NGTD              - Sputum culture: ordered               - CXR: platelike atelectasis in the left upper and lower lobes              - UA and culture: ordered              - CPK: 202    - Management:   Bundle care as able to maintain isolation & minimize in/out of room   - Supplemental O2 to maintain SpO2 92%-96%   if requiring 6L NC or higher, place on nonrebreather and discuss case with MICU   - Telemetry & continuous pulse oximetry    - If wheezing   - albuterol inhaler 2-4 puff Q6hr PRN    - ipratropium daily    - acetaminophen 650mg PO Q6hr PRN fever   - loperamide PRN for viral diarrhea  - Empiric antibiotics per likely source & patient allergies    - CAP: x 5 day course (d/c early if low concern for bacterial co-infection)  Ceftriaxone 1g IV Q24hrs            Azithromycin 500mg IV day #1, then 250mg PO daily x4  days     If azithromycin is not available, start doxycycline                 If MRSA risk factors, add Vancomycin IV (PharmD consult)   - Investigational Treatment Protocol: (if patient meets criteria)   [email or web address]     - statin: atorvastatin 40mg po daily (if CPK WNL)    - start HCQ 400mg PO BID x1 day, then 400mg PO daily x 4 days   (check glucose 6 phosphate dehydrogenase (NOT G6PD Quant), ECG, and start Qshift POCT glucose)    Safety notes:   - Avoid NIPPV (CPAP/BiPAP) to prevent aerosolization, use on a case-by-case basis if in neg pressure room   - Cautious use of NSAIDS for fever per WHO recommendations (3/16/2020)   - No new ACEi/ARB start or discontinuation of chronic med unless hypotensive (Esler et al. Journal of Hypertension 2020, 38:000-000)   - Careful use of steroids in the absence of other indications (shock, ARDS)   - Fluid sparing resuscitation, avoid maintenance fluids     Advance Care Planning  Goals of care, counseling/discussion In light of the patients advanced and life limiting illness,I engaged the the patient's sister, Alicia Retana, in a conversation about the patient's preferences for care  at the very end of life. The patient wishes to have a natural, peaceful death.  Along those lines, the patient does not wish to have CPR or other invasive treatments performed when his heart and/or breathing stops. I communicated to Alicia Retana that a DNR form was completed and will be scanned into EPIC.  I spent a total of 20 minutes engaging the patient in this advance care planning discussion.    Elevated Troponin  Trop 1.3>1.0  EKG no acute ischemic changes  Patient not complaining of CP  Suspect elevated in setting of COVID19 infection, ESRD     Renovascular Hypertension              Pulmonary Hypertension with ESRD on Dialysis   Continue regular dialysis MWF  Continue home  hydralazine prn SBP >170  BP controlled on admission     History of GI bleed   Chronic blood loss anemia   Hgb 10.1 at baseline  Continue home Protonix     Seizures  Continue home Keppra      Severe malnutrition   Nursing home confirmed patient eats orally and receives supplemental Novasource through PEG   Will continue Novasource 50 cc/hr x 10 hrs    VTE High Risk Prophylaxis: enoxaparin 40mg sq QHS @ 2100 (bundled care) if GFR >30    Patient's chronic/stable medical conditions noted in the problem list above will be managed with the patient's home medications as tolerated.       Subsequent Inpatient Hospital CareLevel 3 07117 Total visit time was 35 minutes or greater with greater than 50% of time spent in counseling and coordination of care.   Ray Brown M.D.

## 2020-04-07 NOTE — PLAN OF CARE
Referrals for interim placement sent to [facility]-pt declined and [facility]-no covid+ beds.    Additional referrals will be sent to [facility] in [facility].    Nirmala Post, [facility] [phone]

## 2020-04-08 NOTE — PLAN OF CARE
Problem: Adult Inpatient Plan of Care  Goal: Plan of Care Review  Outcome: Ongoing, Progressing   POC reviewed with pt. HD today at bedside. 550cc removed. Hypotensive. No acute changes. Safety maintained. Will continue to monitor.

## 2020-04-08 NOTE — PROGRESS NOTES
Bedside HD Tx ended. System clotted. 550ML removed during 2.5Hr of #Hr Tx. Hypotensive throughout Tx. Blood returned via Art libe only. 15g needles removed x2. Gauze and tape applied, pressure held for 5mins. Hemostasis achieved.    Report given to Bedside nurse, ISMAEL Shields

## 2020-04-08 NOTE — PROGRESS NOTES
Upon entering room found pt had vomited on himself and vomited once more while in process of cleaning him. This Rn stopped tube feeds and admin zofran via peg tube. Pt residual was 190cc. Pt is to get nocturnal tf. Will keep on hold for now and continue to monitor pt. In the meantime pt hob elevated and sxn at bedside and emesis bag provided.

## 2020-04-08 NOTE — PLAN OF CARE
CM faxed referral to [facility] to assist with TFs, patient possible candidate for [facility] per Hortencia Lepe CM Supervisor.     RUSTY spoke with Kamini with [facility], who is reviewing the chart and looking into benefits.     RUSTY also spoke with Rosalie at [facility], she will bring patient clothes to [facility] if he needs them for the [facility]

## 2020-04-09 NOTE — PROGRESS NOTES
[facility]  Nephrology  Progress Note    Patient Name: Vaughn Retana  MRN: [id number]  Admission Date: 3/28/2020  Hospital Length of Stay: 5 days  Attending Provider: Ray Brown MD   Primary Care Physician: Cori Randall MD  Principal Problem:COVID-19 virus infection    Subjective:     HPI: 53 y/o male with PMHx ESRD on iHD (MWF), GI bleeds, + COVID-19 (3/25/20)        Interval History: Nephrology consulted for ESRD Management    Review of patient's allergies indicates:   Allergen Reactions    Fosrenol [lanthanum] Nausea And Vomiting     Nausea and vomiting     Current Facility-Administered Medications   Medication Frequency    0.9%  NaCl infusion PRN    0.9%  NaCl infusion Once    0.9%  NaCl infusion PRN    0.9%  NaCl infusion PRN    acetaminophen tablet 650 mg Q4H PRN    albuterol inhaler 2 puff Q6H PRN    atorvastatin tablet 40 mg QHS    cinacalcet tablet 60 mg Once    dextromethorphan-guaifenesin  mg/5 ml liquid 10 mL Q4H PRN    dextrose 10% (D10W) Bolus PRN    dextrose 10% (D10W) Bolus PRN    glucagon (human recombinant) injection 1 mg PRN    glucose chewable tablet 16 g PRN    glucose chewable tablet 24 g PRN    hydrALAZINE tablet 25 mg Q8H PRN    levetiracetam oral soln Soln 250 mg BID    melatonin tablet 6 mg Nightly PRN    metoclopramide HCl tablet 10 mg TID AC    midodrine tablet 5 mg Every Mon, Wed, Fri    ondansetron disintegrating tablet 8 mg Q8H PRN    pantoprazole EC tablet 40 mg Daily    promethazine (PHENERGAN) 6.25 mg in dextrose 5 % 50 mL IVPB Q6H PRN    senna tablet 2 tablet Daily    sodium chloride 0.9% flush 10 mL PRN       Objective:     Vital Signs (Most Recent):  Temp: 96.9 °F (36.1 °C) (04/09/20 0724)  Pulse: 97 (04/09/20 0730)  Resp: 16 (04/09/20 0724)  BP: (!) 104/57 (04/09/20 0724)  SpO2: 98 % (04/09/20 0730)  O2 Device (Oxygen Therapy): room air (04/08/20 0712) Vital Signs (24h Range):  Temp:  [96.9 °F (36.1 °C)-98 °F (36.7 °C)]  96.9 °F (36.1 °C)  Pulse:  [] 97  Resp:  [16-20] 16  SpO2:  [98 %-100 %] 98 %  BP: ()/(47-88) 104/57     Weight: 54 kg (119 lb 0.8 oz) (03/28/20 2200)  Body mass index is 19.21 kg/m².  Body surface area is 1.59 meters squared.    I/O last 3 completed shifts:  In: 1480 [Other:400; NG/GT:1080]  Out: 1190 [Drains:240; Other:950]    Physical Exam   Constitutional:   PE deferred to Primary Team d/t COVID-19 precautions       Significant Labs:  All labs within the past 24 hours have been reviewed.     Significant Imaging:  Labs: Reviewed    Assessment/Plan:     * COVID-19 virus infection  Managed by Hospital Medicine    ESRD on dialysis  S/p HD 4/8/20 x 2.5hrs with 500mL removed (d/t hypotension)  Tentatively plan for HD tomorrow  -ETA unknown d/t current HD wait-list  Most recent labs drawn 4/3/20  -CBC, renal function panel with labs tomorrow, if ok with Primary Team  -Meds to renal parameters    Most recent labs 4/3/20  -Hgb 8.6  PMHx: GIB  Ferritin 1762 3/28/20  Continue epogen  [facility]    Corrected Ca+ 10.1  -Ca+ to be adjusted in dialysate bath  -on cinacalcet  Ph- 1.7  On renal  -receiving Novasource with tube feeds    /57  -on midodrine    O2 sat 99%/RA    C Deckbar  Dr. Lemus  MWF  3:45  AVF  DW 52.5kg            Thank you for your consult. I will follow-up with patient. Please contact us if you have any additional questions.    SEAN Figueroa  Nephrology  [facility]

## 2020-04-09 NOTE — PLAN OF CARE
CM spoke with Rosalie at [facility], due to patient having no fevers and no O2 requirements, [facility]. Provider notified.

## 2020-04-09 NOTE — SUBJECTIVE & OBJECTIVE
Interval History: Nephrology consulted for ESRD Management    Review of patient's allergies indicates:   Allergen Reactions    Fosrenol [lanthanum] Nausea And Vomiting     Nausea and vomiting     Current Facility-Administered Medications   Medication Frequency    0.9%  NaCl infusion PRN    0.9%  NaCl infusion Once    0.9%  NaCl infusion PRN    0.9%  NaCl infusion PRN    acetaminophen tablet 650 mg Q4H PRN    albuterol inhaler 2 puff Q6H PRN    atorvastatin tablet 40 mg QHS    cinacalcet tablet 60 mg Once    dextromethorphan-guaifenesin  mg/5 ml liquid 10 mL Q4H PRN    dextrose 10% (D10W) Bolus PRN    dextrose 10% (D10W) Bolus PRN    glucagon (human recombinant) injection 1 mg PRN    glucose chewable tablet 16 g PRN    glucose chewable tablet 24 g PRN    hydrALAZINE tablet 25 mg Q8H PRN    levetiracetam oral soln Soln 250 mg BID    melatonin tablet 6 mg Nightly PRN    metoclopramide HCl tablet 10 mg TID AC    midodrine tablet 5 mg Every Mon, Wed, Fri    ondansetron disintegrating tablet 8 mg Q8H PRN    pantoprazole EC tablet 40 mg Daily    promethazine (PHENERGAN) 6.25 mg in dextrose 5 % 50 mL IVPB Q6H PRN    senna tablet 2 tablet Daily    sodium chloride 0.9% flush 10 mL PRN       Objective:     Vital Signs (Most Recent):  Temp: 96.9 °F (36.1 °C) (04/09/20 0724)  Pulse: 97 (04/09/20 0730)  Resp: 16 (04/09/20 0724)  BP: (!) 104/57 (04/09/20 0724)  SpO2: 98 % (04/09/20 0730)  O2 Device (Oxygen Therapy): room air (04/08/20 2318) Vital Signs (24h Range):  Temp:  [96.9 °F (36.1 °C)-98 °F (36.7 °C)] 96.9 °F (36.1 °C)  Pulse:  [] 97  Resp:  [16-20] 16  SpO2:  [98 %-100 %] 98 %  BP: ()/(47-88) 104/57     Weight: 54 kg (119 lb 0.8 oz) (03/28/20 2200)  Body mass index is 19.21 kg/m².  Body surface area is 1.59 meters squared.    I/O last 3 completed shifts:  In: 1480 [Other:400; NG/GT:1080]  Out: 1190 [Drains:240; Other:950]    Physical Exam   Constitutional:   PE deferred to Primary  Team d/t COVID-19 precautions       Significant Labs:  All labs within the past 24 hours have been reviewed.     Significant Imaging:  Labs: Reviewed

## 2020-04-09 NOTE — PLAN OF CARE
04/09/20 1349   Post-Acute Status   Post-Acute Authorization Dialysis   Diaylsis Status Referrals Sent   Discharge Delays (!) Other     Referral sent to [facility] [place] to change clinics due to COVID+ status. SW spoke to Rush Ang ([facility] [place] who is assisting with expediting and ensuring patient is placed at [facility] upon DC from hospital. SW awaiting referral review, acceptance, and HD chair time.     Concepcion Gonzales, NERY  [facility]  [phone]

## 2020-04-09 NOTE — PROGRESS NOTES
"Hospital Medicine  Progress Note  [facility]      Patient Name: Vaughn Retana  MRN:  [id number]  Hospital Medicine Team: VIRTUAL HOSPITAL MEDICINE TEAM 7 Ray Brown MD  Date of Admission:  3/28/2020     Length of Stay:  LOS: 5 days       Principal Problem:  COVID-19 virus infection      Hospital Course:  Patient admitted from NH for of COVID-19. Has maintained sats ORA       This service was provided through telemedicine.  Visit type: Virtual visit with synchronous audio and video  Start time: 11:20  aint: SOB  The patient location is: [address] [place] [address] A  Present with the patient at the time of the telemedicine/virtual assessment:   End time: 11:25  Total time spent with patient: 5 min    Interval History:  4/09 Doing well stable, dc to NH on Monday.  4/07 stable, no recent labs, Afebrile on RA. Awai tplacement.  4/06 Ready for DC to SNF. Stable.4/05 afebrile CRP of 200 on 4/03, On RA satting at 100%.DNR. readay for DC to NH.  4/02 Ready for DC to SNF No reported events overnight. Maintaining sats ORA. Pt still not very cooperative with exam, he shakes his head no to ROS questions. Per CM, [facility] is not accepting previous residential patients. Will await updates  Review of Systems:  Patient shakes head "no" when asked about shortness of breath, chest pain, or cough.     Inpatient Medications:    Current Facility-Administered Medications:     0.9%  NaCl infusion, , Intravenous, PRN, Nayeli Orozco, FNP    0.9%  NaCl infusion, , Intravenous, Once, Nayeli Orozco, FNP    0.9%  NaCl infusion, , Intravenous, PRN, Nayeli Orozco, FNP    0.9%  NaCl infusion, , Intravenous, PRN, Nayeli Orozco, FNP    [START ON 4/10/2020] 0.9%  NaCl infusion, , Intravenous, PRN, Nayeli Orozco, FNP    [START ON 4/10/2020] 0.9%  NaCl infusion, , Intravenous, Once, Nayeli Orozco, FNP    acetaminophen tablet 650 mg, 650 mg, Oral, Q4H PRN, Charissa Abraham PA-C, 650 mg at " 04/04/20 2131    albuterol inhaler 2 puff, 2 puff, Inhalation, Q6H PRN **AND** MDI Q6H PRN, , , Q6H PRN, Ricky Morgan MD    atorvastatin tablet 40 mg, 40 mg, Per G Tube, QHS, Aleja Donovan, PA-C, 40 mg at 04/08/20 2150    cinacalcet tablet 60 mg, 60 mg, Oral, Once, Nayeli Orozco, SEAN    dextromethorphan-guaifenesin  mg/5 ml liquid 10 mL, 10 mL, Oral, Q4H PRN, Charissa Abraham PA-C    dextrose 10% (D10W) Bolus, 12.5 g, Intravenous, PRN, Michael Potter MD    dextrose 10% (D10W) Bolus, 25 g, Intravenous, PRN, Michael Potter MD    glucagon (human recombinant) injection 1 mg, 1 mg, Intramuscular, PRN, Charissa Abraham PA-C    glucose chewable tablet 16 g, 16 g, Oral, PRN, Charissa Chokyleeo, PA-C    glucose chewable tablet 24 g, 24 g, Oral, PRN, Charissa Peterseno PA-C    hydrALAZINE tablet 25 mg, 25 mg, Oral, Q8H PRN, Charissa Abraham PA-C    levetiracetam oral soln Soln 250 mg, 250 mg, Per G Tube, BID, Aleja Donovan PA-C, 250 mg at 04/09/20 0958    melatonin tablet 6 mg, 6 mg, Oral, Nightly PRN, Charissa Abraham, PA-C, 6 mg at 03/31/20 2255    metoclopramide HCl tablet 10 mg, 10 mg, Per G Tube, TID AC, Aleja Donovan, PA-C, 10 mg at 04/09/20 0959    midodrine tablet 5 mg, 5 mg, Per G Tube, Every Mon, Wed, Fri, Aleja Donovan PA-C, 5 mg at 04/08/20 1150    ondansetron disintegrating tablet 8 mg, 8 mg, Oral, Q8H PRN, Charissa Abraham PA-C, 8 mg at 04/08/20 0500    pantoprazole EC tablet 40 mg, 40 mg, Oral, Daily, Ray Brown MD, 40 mg at 04/08/20 2150    promethazine (PHENERGAN) 6.25 mg in dextrose 5 % 50 mL IVPB, 6.25 mg, Intravenous, Q6H PRN, Charissa Abraham PA-C    senna tablet 2 tablet, 2 tablet, Per G Tube, Daily, Aleja Donovan PA-C, 2 tablet at 04/09/20 0959    sodium chloride 0.9% flush 10 mL, 10 mL, Intravenous, PRN, Michael Potter MD      Physical Exam:      Intake/Output Summary (Last 24 hours) at 4/9/2020 1129  Last data filed at 4/9/2020 0600  Gross per 24 hour   Intake 1000 ml  "  Output 950 ml   Net 50 ml     Wt Readings from Last 3 Encounters:   03/28/20 54 kg (119 lb 0.8 oz)   03/25/20 54.4 kg (120 lb)   03/21/20 54.4 kg (120 lb)       BP (!) 104/57   Pulse 97   Temp 96.9 °F (36.1 °C)   Resp 16   Ht 5' 6" (1.676 m)   Wt 54 kg (119 lb 0.8 oz)   SpO2 98%   BMI 19.21 kg/m²     GEN: NAD, not conversant  Resp: normal work of breathing   CV: no edema  MSK: L BKA  Neuro: awake, alert, will nod yes and no to some questions; not participatory in exam or questioning    Laboratory:  Lab Results   Component Value Date    [id number] Detected (A) 03/25/2020       Recent Labs   Lab 04/02/20  1502 04/03/20  0008   WBC 6.36 6.96   LYMPH 13.2*  0.8* 8.9*  0.6*   HGB 7.7* 8.0*   HCT 24.8* 25.7*   * 320     Recent Labs   Lab 04/02/20  1502 04/03/20  0008    136   K 3.9 3.9   CL 98 99   CO2 22* 22*   BUN 79* 35*   CREATININE 9.4* 5.3*   * 152*   CALCIUM 8.9 9.3   MG 2.0 1.8   PHOS 1.9* 1.7*     Recent Labs   Lab 04/02/20  1502 04/03/20  0008   ALKPHOS  --  75   ALT  --  10   AST  --  24   ALBUMIN 2.0* 2.1*   PROT  --  8.6*   BILITOT  --  0.5        No results for input(s): DDIMER, FERRITIN, CRP, LDH, BNP, TROPONINI, CPK in the last 72 hours.    Invalid input(s): PROCALCITONIN    All labs within the last 24 hours were reviewed.     Microbiology:  Microbiology Results (last 7 days)     ** No results found for the last 168 hours. **            Imaging  ECG Results          EKG 12-lead (Final result)  Result time 03/29/20 23:09:47    Final result by Interface, Lab In [facility] (03/29/20 23:09:47)                 Narrative:    Test Reason : R06.02,    Vent. Rate : 103 BPM     Atrial Rate : 103 BPM     P-R Int : 146 ms          QRS Dur : 098 ms      QT Int : 382 ms       P-R-T Axes : 089 -49 066 degrees     QTc Int : 500 ms    Sinus tachycardia  Right ventricular conduction delay  Left anterior fascicular block  Voltage criteria for left ventricular hypertrophy  Nonspecific T wave " abnormality  Abnormal ECG  When compared with ECG of 25-MAR-2020 13:54,  The axis Shifted left  Nonspecific T wave abnormality no longer evident in Inferior leads  Confirmed by Zeke Burns MD ([id number]) on 3/29/2020 11:09:42 PM    Referred By: KHARI   SELF           Confirmed By:Zeke Burns MD                              No results found for this or any previous visit.    X-Ray Chest AP Portable  Narrative: EXAMINATION:  XR CHEST AP PORTABLE    CLINICAL HISTORY:  Fever, COVID+;    TECHNIQUE:  Single frontal view of the chest was performed.    COMPARISON:  03/25/2020 and 03/21/2020    FINDINGS:  The patient is rotated on this exam limiting evaluation of the left lung.  Platelike airspace opacities are identified in the left upper and left lower lobes.  No focal airspace consolidation.  No pneumothorax or pleural effusion.  The cardiomediastinal silhouette is normal.    Incidental note is made of a right subclavian vascular stent.  The osseous and soft tissue structures are normal.  Impression: Findings compatible with platelike atelectasis in the left upper and lower lobes.  Overall no significant interval change.    Electronically signed by: Kassy Oro MD  Date:    03/28/2020  Time:    12:38      All imaging within the last 24 hours was reviewed.     Assessment and Plan:    Active Hospital Problems    Diagnosis  POA    *COVID-19 virus infection [U07.1]  Yes    Fever [R50.9]  Yes    ESRD on dialysis [N18.6, Z99.2]  Not Applicable    Seizure [R56.9]  Yes    Chronic blood loss anemia [D50.0]  Yes    Personal history of latent tuberculosis infection [Z86.15]  Yes    Severe malnutrition [E43]  Yes     Assessment and Plan  Severe Malnutrition in the context of Social/Environmental Circumstances     Related to (etiology):  Unknown etiology     Signs and Symptoms (as evidenced by):  Energy Intake: per pt, <75% intake over the last year  Body Fat Depletion: overall subcutaneous fat loss, moderate  triceps fat loss and protruding patellas.  Muscle Mass Depletion:  moderate muscle wasting of interosseous, temporal, clavicle, calves and quadriceps  Weight Loss: 24% (39 lbs) x 1 year    Recommendations     Recommendation/Intervention: 1. Should pt be cleared for po diet, recommend renal diet with texture per SLP along with Novasource ONS TID. 2. Should pt require enteral feeding, initiate Novasource renal formula at 10ml/hr and advance 10ml Q4hrs to goal rate of 40ml/hr (provides 1920kcal, 87g pro, 691ml free water). Provide additional 500ml water flush/24 hrs. Hold for residuals >500ml.  Goals: 1. Pt to receive nutrition < 48 hrs.      History of GI bleed [Z87.19]  Not Applicable    Renovascular hypertension [I15.0]  Yes     Chronic    Pulmonary hypertension associated with end-stage renal disease (ESRD) on dialysis [I27.29, N18.6, Z99.2]  Not Applicable     Chronic    Chronic diastolic heart failure [I50.32]  Yes     Chronic     2-23-17    1 - Low normal to mildly depressed left ventricular systolic function (EF 50-55%).     2 - Right ventricular enlargement with mildly depressed systolic function.     3 - Left ventricular diastolic dysfunction.     4 - Right atrial enlargement.     5 - Severe tricuspid regurgitation.     6 - Pulmonary hypertension. The estimated PA systolic pressure is 86 mmHg.     7 - Increased central venous pressure.       Secondary hyperparathyroidism of renal origin [N25.81]  Yes      Resolved Hospital Problems   No resolved problems to display.       Suspected Covid-19 Virus Infection  Person Under Investigation (PUI) for COVID-19  - COVID-19 testing: Collection Date: 3/25/2020 Collection Time:   2:10 PM  - Infection Control notified    - Isolation:   - Airborne and Droplet Precautions  - Surgical mask on patient   - N95 masks must be fit tested, wear eye protection  - 20 second hand hygiene   - Limit visitors per hospital policy   - Consolidate lab draws, nursing care, and  interventions    - Diagnostics: (Lymphopenia, hyponatremia, hyperferritinemia, elevated troponin, elevated d-dimer, age, and comorbidities are significant predictors of poor clinical outcome)     - CBC: Hgb 10.1                     trend Q48hrs  - CMP: BUN 44, Cr 6.7           trend Q48hrs  - Procalcitonin: 2.24  - D-dimer: 0.98                                    repeat prior to discharge  - Ferritin: 1762                         repeat prior to discharge   - CRP: 185.1    >132                       trend Q48hrs downtrending  - LDH:  ordered                        repeat prior to discharge  - BNP: ordered  - Troponin: 1.344                      - ECG: estrella acute ischemic changes              - rapid Flu: negative on 3/21/20              - RIP only if BMT/solid transplant:              - Legionella antigen: ordered              - Blood culture x2: NGTD              - Sputum culture: ordered               - CXR: platelike atelectasis in the left upper and lower lobes              - UA and culture: ordered              - CPK: 202    - Management:   Bundle care as able to maintain isolation & minimize in/out of room   - Supplemental O2 to maintain SpO2 92%-96%   if requiring 6L NC or higher, place on nonrebreather and discuss case with MICU   - Telemetry & continuous pulse oximetry    - If wheezing   - albuterol inhaler 2-4 puff Q6hr PRN    - ipratropium daily    - acetaminophen 650mg PO Q6hr PRN fever   - loperamide PRN for viral diarrhea  - Empiric antibiotics per likely source & patient allergies    - CAP: x 5 day course (d/c early if low concern for bacterial co-infection)  Ceftriaxone 1g IV Q24hrs            Azithromycin 500mg IV day #1, then 250mg PO daily x4 days     If azithromycin is not available, start doxycycline                 If MRSA risk factors, add Vancomycin IV (PharmD consult)   - Investigational Treatment Protocol: (if patient meets criteria)    [email or web address]     - statin: atorvastatin 40mg po daily (if CPK WNL)    - start HCQ 400mg PO BID x1 day, then 400mg PO daily x 4 days   (check glucose 6 phosphate dehydrogenase (NOT G6PD Quant), ECG, and start Qshift POCT glucose)    Safety notes:   - Avoid NIPPV (CPAP/BiPAP) to prevent aerosolization, use on a case-by-case basis if in neg pressure room   - Cautious use of NSAIDS for fever per WHO recommendations (3/16/2020)   - No new ACEi/ARB start or discontinuation of chronic med unless hypotensive (Esler et al. Journal of Hypertension 2020, 38:000-000)   - Careful use of steroids in the absence of other indications (shock, ARDS)   - Fluid sparing resuscitation, avoid maintenance fluids     Advance Care Planning  Goals of care, counseling/discussion In light of the patients advanced and life limiting illness,I engaged the the patient's sister, Alicia Retana, in a conversation about the patient's preferences for care  at the very end of life. The patient wishes to have a natural, peaceful death.  Along those lines, the patient does not wish to have CPR or other invasive treatments performed when his heart and/or breathing stops. I communicated to Alicia Retana that a DNR form was completed and will be scanned into EPIC.  I spent a total of 20 minutes engaging the patient in this advance care planning discussion.    Elevated Troponin  Trop 1.3>1.0  EKG no acute ischemic changes  Patient not complaining of CP  Suspect elevated in setting of COVID19 infection, ESRD     Renovascular Hypertension              Pulmonary Hypertension with ESRD on Dialysis   Continue regular dialysis MWF  Continue home hydralazine prn SBP >170  BP controlled on admission     History of GI bleed   Chronic blood loss anemia   Hgb 10.1 at baseline  Continue home Protonix     Seizures  Continue home Keppra      Severe  malnutrition   Nursing home confirmed patient eats orally and receives supplemental Novasource through PEG   Will continue Novasource 50 cc/hr x 10 hrs    VTE High Risk Prophylaxis: enoxaparin 40mg sq QHS @ 2100 (bundled care) if GFR >30    Patient's chronic/stable medical conditions noted in the problem list above will be managed with the patient's home medications as tolerated.       Subsequent Inpatient Hospital CareLevel 3 [id number] Total visit time was 35 minutes or greater with greater than 50% of time spent in counseling and coordination of care.   Ray Brown M.D.

## 2020-04-09 NOTE — ASSESSMENT & PLAN NOTE
S/p HD 4/8/20 x 2.5hrs with 500mL removed (d/t hypotension)  Tentatively plan for HD tomorrow  -ETA unknown d/t current HD wait-list  Most recent labs drawn 4/3/20  -CBC, renal function panel with labs tomorrow, if ok with Primary Team  -Meds to renal parameters    Most recent labs 4/3/20  -Hgb 8.6  PMHx: GIB  Ferritin 1762 3/28/20  Continue epogen  Managed by Hospital Medicine    Corrected Ca+ 10.1  -Ca+ to be adjusted in dialysate bath  -on cinacalcet  Ph- 1.7  On renal  -receiving Novasource with tube feeds    /57  -on midodrine    O2 sat 99%/RA    [facility]  Dr. Lemus  MWF  3:45  AVF  DW 52.5kg

## 2020-04-10 NOTE — NURSING
PT HAD 60 ML OF RESIDUAL.  TUBE FEEDING STOPPED.  PT REFUSING TO BE TURNED BY STAFF.  ON CALL NP (BL) NOTIFIED BY THIS NURSE (RM).  PER ON CALL NP (BL) RESUME TUBE FEEDING AS ORDERED.  WILL CONTINUE TO MONITOR.

## 2020-04-10 NOTE — CARE UPDATE
Rapid Response Nurse Chart Check     Chart check completed, abnormal VS noted.   Please call [phone] for further concerns or assistance.

## 2020-04-10 NOTE — NURSING
Tube feeding stopped. Flushed with 100 cc water. Pt transported to dialysis in bed. Telemetry and continuous pulse ox on.

## 2020-04-10 NOTE — PLAN OF CARE
Alert and oriented to person. - pt nonverbal but will respond to requests. VSS, denies pain.  Able to reposition himself in bed with staff assistance. Pt is incontinent however, outstanding for BM as well as being anuric. Pt remains free from falls. Bed locked and lowered. Bed alarm maintained. Personal items & call light w/in reach.

## 2020-04-10 NOTE — PROGRESS NOTES
"Hospital Medicine  Progress Note  [facility]      Patient Name: Vaughn Retana  MRN:  [id number]  Hospital Medicine Team: VIRTUAL HOSPITAL MEDICINE TEAM 7 Ray Brown MD  Date of Admission:  3/28/2020     Length of Stay:  LOS: 6 days       Principal Problem:  COVID-19 virus infection      Hospital Course:  Patient admitted from NH for of COVID-19. Has maintained sats ORA       This service was provided through telemedicine.  Visit type: Virtual visit with synchronous audio and video  Start time: 11:20  aint: SOB  The patient location is: [address] [place] [address] A  Present with the patient at the time of the telemedicine/virtual assessment:   End time: 11:25  Total time spent with patient: 5 min    Interval History:  4/10 DC to NH on Monday 4/09 Doing well stable, dc to NH on Monday.  4/07 stable, no recent labs, Afebrile on RA. Awai tplacement.  4/06 Ready for DC to SNF. Stable.4/05 afebrile CRP of 200 on 4/03, On RA satting at 100%.DNR. readay for DC to NH.  4/02 Ready for DC to SNF No reported events overnight. Maintaining sats ORA. Pt still not very cooperative with exam, he shakes his head no to ROS questions. Per CM, [facility] is not accepting previous snf patients. Will await updates  Review of Systems:  Patient shakes head "no" when asked about shortness of breath, chest pain, or cough.     Inpatient Medications:    Current Facility-Administered Medications:     0.9%  NaCl infusion, , Intravenous, PRN, Nayeli Orozco, FNP    0.9%  NaCl infusion, , Intravenous, Once, Nayeli Orozco, FNP    0.9%  NaCl infusion, , Intravenous, PRN, Nayeli Orozco, FNP    0.9%  NaCl infusion, , Intravenous, PRN, Nayeli Orozco, FNP    0.9%  NaCl infusion, , Intravenous, PRN, Nayeli Orozco, FNP    acetaminophen tablet 650 mg, 650 mg, Oral, Q4H PRN, Charissa Abraham PA-C, 650 mg at 04/04/20 2131    albuterol inhaler 2 puff, 2 puff, Inhalation, Q6H PRN **AND** MDI Q6H " PRN, , , Q6H PRN, Ricky Morgan MD    atorvastatin tablet 40 mg, 40 mg, Per G Tube, QHS, Aleja Donovan PA-C, 40 mg at 04/09/20 2215    cinacalcet tablet 60 mg, 60 mg, Oral, Once, Nayeli Orozco, FNP    dextromethorphan-guaifenesin  mg/5 ml liquid 10 mL, 10 mL, Oral, Q4H PRN, Charissa Abraham PA-C    dextrose 10% (D10W) Bolus, 12.5 g, Intravenous, PRN, Michael Potter MD    dextrose 10% (D10W) Bolus, 25 g, Intravenous, PRN, Michael Potter MD    glucagon (human recombinant) injection 1 mg, 1 mg, Intramuscular, PRN, Charissa Abraham PA-C    glucose chewable tablet 16 g, 16 g, Oral, PRN, Charissa Choroslyn PA-C    glucose chewable tablet 24 g, 24 g, Oral, PRN, Charissa Chokyleeo, PA-C    hydrALAZINE tablet 25 mg, 25 mg, Oral, Q8H PRN, Charissa Peterseno PA-C    levetiracetam oral soln Soln 250 mg, 250 mg, Per G Tube, BID, Aleja Donovan PA-C, 250 mg at 04/10/20 1132    melatonin tablet 6 mg, 6 mg, Oral, Nightly PRN, Charissa Abraham PA-C, 6 mg at 03/31/20 2255    metoclopramide HCl tablet 10 mg, 10 mg, Per G Tube, TID AC, Aleja Donovan PA-C, 10 mg at 04/10/20 1133    midodrine tablet 5 mg, 5 mg, Per G Tube, Every Mon, Wed, Fri, Aleja Donovan PA-C, 5 mg at 04/08/20 1150    ondansetron disintegrating tablet 8 mg, 8 mg, Oral, Q8H PRN, Charissa Abraham PA-C, 8 mg at 04/08/20 0500    pantoprazole EC tablet 40 mg, 40 mg, Oral, Daily, Ray Brown MD, 40 mg at 04/09/20 2215    promethazine (PHENERGAN) 6.25 mg in dextrose 5 % 50 mL IVPB, 6.25 mg, Intravenous, Q6H PRN, Charissa Abraham PA-C    senna tablet 2 tablet, 2 tablet, Per G Tube, Daily, Aleja Donovan PA-C, 2 tablet at 04/10/20 1132    sodium chloride 0.9% flush 10 mL, 10 mL, Intravenous, PRN, Michael Potter MD      Physical Exam:      Intake/Output Summary (Last 24 hours) at 4/10/2020 1335  Last data filed at 4/10/2020 1025  Gross per 24 hour   Intake 1210 ml   Output 1550 ml   Net -340 ml     Wt Readings from Last 3 Encounters:   03/28/20 54 kg (119  "lb 0.8 oz)   03/25/20 54.4 kg (120 lb)   03/21/20 54.4 kg (120 lb)       BP (!) 121/50 (BP Location: Left arm, Patient Position: Lying)   Pulse 101   Temp 96.4 °F (35.8 °C) (Oral)   Resp 20   Ht 5' 6" (1.676 m)   Wt 54 kg (119 lb 0.8 oz)   SpO2 100%   BMI 19.21 kg/m²     GEN: NAD, not conversant  Resp: normal work of breathing   CV: no edema  MSK: L BKA  Neuro: awake, alert, will nod yes and no to some questions; not participatory in exam or questioning    Laboratory:  Lab Results   Component Value Date    [id number] Detected (A) 03/25/2020       No results for input(s): WBC, LYMPH, HGB, HCT, PLT in the last 168 hours.  Recent Labs   Lab 04/10/20  0721   *   K 3.8   CL 95   CO2 25   BUN 55*   CREATININE 7.0*   *   CALCIUM 8.8   PHOS 2.7     Recent Labs   Lab 04/10/20  0721   ALBUMIN 2.0*        No results for input(s): DDIMER, FERRITIN, CRP, LDH, BNP, TROPONINI, CPK in the last 72 hours.    Invalid input(s): PROCALCITONIN    All labs within the last 24 hours were reviewed.     Microbiology:  Microbiology Results (last 7 days)     ** No results found for the last 168 hours. **            Imaging  ECG Results          EKG 12-lead (Final result)  Result time 03/29/20 23:09:47    Final result by Interface, Lab In [facility] (03/29/20 23:09:47)                 Narrative:    Test Reason : R06.02,    Vent. Rate : 103 BPM     Atrial Rate : 103 BPM     P-R Int : 146 ms          QRS Dur : 098 ms      QT Int : 382 ms       P-R-T Axes : 089 -49 066 degrees     QTc Int : 500 ms    Sinus tachycardia  Right ventricular conduction delay  Left anterior fascicular block  Voltage criteria for left ventricular hypertrophy  Nonspecific T wave abnormality  Abnormal ECG  When compared with ECG of 25-MAR-2020 13:54,  The axis Shifted left  Nonspecific T wave abnormality no longer evident in Inferior leads  Confirmed by Zeke Burns MD ([id number]) on 3/29/2020 11:09:42 PM    Referred By: KHARI   SELF           Confirmed " By:Zeke Burns MD                              No results found for this or any previous visit.    X-Ray Chest AP Portable  Narrative: EXAMINATION:  XR CHEST AP PORTABLE    CLINICAL HISTORY:  Fever, COVID+;    TECHNIQUE:  Single frontal view of the chest was performed.    COMPARISON:  03/25/2020 and 03/21/2020    FINDINGS:  The patient is rotated on this exam limiting evaluation of the left lung.  Platelike airspace opacities are identified in the left upper and left lower lobes.  No focal airspace consolidation.  No pneumothorax or pleural effusion.  The cardiomediastinal silhouette is normal.    Incidental note is made of a right subclavian vascular stent.  The osseous and soft tissue structures are normal.  Impression: Findings compatible with platelike atelectasis in the left upper and lower lobes.  Overall no significant interval change.    Electronically signed by: Kassy Oro MD  Date:    03/28/2020  Time:    12:38      All imaging within the last 24 hours was reviewed.     Assessment and Plan:    Active Hospital Problems    Diagnosis  POA    *COVID-19 virus infection [U07.1]  Yes    Fever [R50.9]  Yes    ESRD on dialysis [N18.6, Z99.2]  Not Applicable    Seizure [R56.9]  Yes    Chronic blood loss anemia [D50.0]  Yes    Personal history of latent tuberculosis infection [Z86.15]  Yes    Severe malnutrition [E43]  Yes     Assessment and Plan  Severe Malnutrition in the context of Social/Environmental Circumstances     Related to (etiology):  Unknown etiology     Signs and Symptoms (as evidenced by):  Energy Intake: per pt, <75% intake over the last year  Body Fat Depletion: overall subcutaneous fat loss, moderate triceps fat loss and protruding patellas.  Muscle Mass Depletion:  moderate muscle wasting of interosseous, temporal, clavicle, calves and quadriceps  Weight Loss: 24% (39 lbs) x 1 year    Recommendations     Recommendation/Intervention: 1. Should pt be cleared for po diet, recommend  renal diet with texture per SLP along with Novasource ONS TID. 2. Should pt require enteral feeding, initiate Novasource renal formula at 10ml/hr and advance 10ml Q4hrs to goal rate of 40ml/hr (provides 1920kcal, 87g pro, 691ml free water). Provide additional 500ml water flush/24 hrs. Hold for residuals >500ml.  Goals: 1. Pt to receive nutrition < 48 hrs.      History of GI bleed [Z87.19]  Not Applicable    Renovascular hypertension [I15.0]  Yes     Chronic    Pulmonary hypertension associated with end-stage renal disease (ESRD) on dialysis [I27.29, N18.6, Z99.2]  Not Applicable     Chronic    Chronic diastolic heart failure [I50.32]  Yes     Chronic     2-23-17    1 - Low normal to mildly depressed left ventricular systolic function (EF 50-55%).     2 - Right ventricular enlargement with mildly depressed systolic function.     3 - Left ventricular diastolic dysfunction.     4 - Right atrial enlargement.     5 - Severe tricuspid regurgitation.     6 - Pulmonary hypertension. The estimated PA systolic pressure is 86 mmHg.     7 - Increased central venous pressure.       Secondary hyperparathyroidism of renal origin [N25.81]  Yes      Resolved Hospital Problems   No resolved problems to display.       Suspected Covid-19 Virus Infection  Person Under Investigation (PUI) for COVID-19  - COVID-19 testing: Collection Date: 3/25/2020 Collection Time:   2:10 PM  - Infection Control notified    - Isolation:   - Airborne and Droplet Precautions  - Surgical mask on patient   - N95 masks must be fit tested, wear eye protection  - 20 second hand hygiene   - Limit visitors per hospital policy   - Consolidate lab draws, nursing care, and interventions    - Diagnostics: (Lymphopenia, hyponatremia, hyperferritinemia, elevated troponin, elevated d-dimer, age, and comorbidities are significant predictors of poor clinical outcome)     - CBC: Hgb 10.1                     trend Q48hrs  - CMP: BUN 44, Cr 6.7           trend Q48hrs  -  Procalcitonin: 2.24  - D-dimer: 0.98                                    repeat prior to discharge  - Ferritin: 1762                         repeat prior to discharge   - CRP: 185.1    >132                       trend Q48hrs downtrending  - LDH:  ordered                        repeat prior to discharge  - BNP: ordered  - Troponin: 1.344                      - ECG: estrella acute ischemic changes              - rapid Flu: negative on 3/21/20              - RIP only if BMT/solid transplant:              - Legionella antigen: ordered              - Blood culture x2: NGTD              - Sputum culture: ordered               - CXR: platelike atelectasis in the left upper and lower lobes              - UA and culture: ordered              - CPK: 202    - Management:   Bundle care as able to maintain isolation & minimize in/out of room   - Supplemental O2 to maintain SpO2 92%-96%   if requiring 6L NC or higher, place on nonrebreather and discuss case with MICU   - Telemetry & continuous pulse oximetry    - If wheezing   - albuterol inhaler 2-4 puff Q6hr PRN    - ipratropium daily    - acetaminophen 650mg PO Q6hr PRN fever   - loperamide PRN for viral diarrhea  - Empiric antibiotics per likely source & patient allergies    - CAP: x 5 day course (d/c early if low concern for bacterial co-infection)  Ceftriaxone 1g IV Q24hrs            Azithromycin 500mg IV day #1, then 250mg PO daily x4 days     If azithromycin is not available, start doxycycline                 If MRSA risk factors, add Vancomycin IV (PharmD consult)   - Investigational Treatment Protocol: (if patient meets criteria)   [email or web address]     - statin: atorvastatin 40mg po daily (if CPK WNL)    - start HCQ 400mg PO BID x1 day, then 400mg PO daily x 4 days   (check glucose 6 phosphate dehydrogenase (NOT G6PD Quant), ECG, and start Qshift POCT glucose)    Safety  notes:   - Avoid NIPPV (CPAP/BiPAP) to prevent aerosolization, use on a case-by-case basis if in neg pressure room   - Cautious use of NSAIDS for fever per WHO recommendations (3/16/2020)   - No new ACEi/ARB start or discontinuation of chronic med unless hypotensive (Kristiler et al. Journal of Hypertension 2020, 38:000-000)   - Careful use of steroids in the absence of other indications (shock, ARDS)   - Fluid sparing resuscitation, avoid maintenance fluids     Advance Care Planning  Goals of care, counseling/discussion In light of the patients advanced and life limiting illness,I engaged the the patient's sister, Alicia Retana, in a conversation about the patient's preferences for care  at the very end of life. The patient wishes to have a natural, peaceful death.  Along those lines, the patient does not wish to have CPR or other invasive treatments performed when his heart and/or breathing stops. I communicated to Alicia Retana that a DNR form was completed and will be scanned into EPIC.  I spent a total of 20 minutes engaging the patient in this advance care planning discussion.    Elevated Troponin  Trop 1.3>1.0  EKG no acute ischemic changes  Patient not complaining of CP  Suspect elevated in setting of COVID19 infection, ESRD     Renovascular Hypertension              Pulmonary Hypertension with ESRD on Dialysis   Continue regular dialysis MWF  Continue home hydralazine prn SBP >170  BP controlled on admission     History of GI bleed   Chronic blood loss anemia   Hgb 10.1 at baseline  Continue home Protonix     Seizures  Continue home Keppra      Severe malnutrition   Nursing home confirmed patient eats orally and receives supplemental Novasource through PEG   Will continue Novasource 50 cc/hr x 10 hrs    VTE High Risk Prophylaxis: enoxaparin 40mg sq QHS @ 2100 (bundled care) if GFR >30    Patient's chronic/stable medical conditions noted in the problem list above will be managed with the patient's  home medications as tolerated.       Subsequent Inpatient Hospital CareLevel 3 [id number] Total visit time was 35 minutes or greater with greater than 50% of time spent in counseling and coordination of care.   Ray Brown M.D.

## 2020-04-10 NOTE — PROGRESS NOTES
Maintenance hemodialysis tx initiated via [facility], tolerated well, flows good. Isolation status maintained d/t Covid-19. Blood obtained for RFP and  called to

## 2020-04-10 NOTE — PLAN OF CARE
PLAN OF CARE REVIEWED WITH PT.  PT AA+OX1 (TO SELF).  ABLE TO MAKE NEEDS KNOWN.  DOES NOT APPEAR TO BE IN ANY DISTRESS.  NO C/O PAIN.  WILL CONTINUE TO REASSESS PAIN.  REQUIRES MODERATE ASSIST WITH ADLS.  INCONT. OF BOWEL.  SAMANTHA CARE PROVIDED.  PT IS ON HEMODIALYSIS.  PT DOES NOT URINATE.  TUBE FEEDING INFUSING AS ORDERED.  PT REFUSED TO BE TURNED BY STAFF.  PT REMAINS FREE FROM FALLS, INJURY, AND TRAUMA.  FALL PRECAUTIONS IN PLACE.  BED IN LOWEST POSITION WITH WHEELS LOCKED.  CALL LIGHT WITHIN REACH.  WILL CONTINUE TO MONITOR.

## 2020-04-11 NOTE — PROGRESS NOTES
"Hospital Medicine  Progress Note  [facility]      Patient Name: Vaughn Retana  MRN:  [id number]  Hospital Medicine Team: VIRTUAL HOSPITAL MEDICINE TEAM 7 Ray Brown MD  Date of Admission:  3/28/2020     Length of Stay:  LOS: 7 days       Principal Problem:  COVID-19 virus infection      Hospital Course:  Patient admitted from NH for of COVID-19. Has maintained sats ORA       This service was provided through telemedicine.  Visit type: Virtual visit with synchronous audio and video  Start time: 10:10  aint: SOB  The patient location is: [address] [place] [address] A  Present with the patient at the time of the telemedicine/virtual assessment:   End time: 10:15  Total time spent with patient: 5 min    Interval History:  4/11 DC to NH on Monday, Stable.  4/10 DC to NH on Monday 4/09 Doing well stable, dc to NH on Monday.  4/07 stable, no recent labs, Afebrile on RA. Awai tplacement.  4/06 Ready for DC to SNF. Stable.4/05 afebrile CRP of 200 on 4/03, On RA satting at 100%.DNR. readay for DC to NH.  4/02 Ready for DC to SNF No reported events overnight. Maintaining sats ORA. Pt still not very cooperative with exam, he shakes his head no to ROS questions. Per CM, [facility] is not accepting previous retirement patients. Will await updates  Review of Systems:  Patient shakes head "no" when asked about shortness of breath, chest pain, or cough.     Inpatient Medications:    Current Facility-Administered Medications:     0.9%  NaCl infusion, , Intravenous, PRN, Nayeli Orozco, FNP    0.9%  NaCl infusion, , Intravenous, Once, Nayeli Orozco, FNP    0.9%  NaCl infusion, , Intravenous, PRN, Nayeli Orozco, FNP    0.9%  NaCl infusion, , Intravenous, PRN, Nayeli Orozco, FNP    0.9%  NaCl infusion, , Intravenous, PRN, Nayeli Orozco, FNP    acetaminophen tablet 650 mg, 650 mg, Oral, Q4H PRN, Charissa Abraham PA-C, 650 mg at 04/11/20 0201    albuterol inhaler 2 puff, 2 puff, " Inhalation, Q6H PRN **AND** MDI Q6H PRN, , , Q6H PRN, Ricky Morgan MD    atorvastatin tablet 40 mg, 40 mg, Per G Tube, QHS, Aleja Donovan, PA-C, 40 mg at 04/10/20 2033    cinacalcet tablet 60 mg, 60 mg, Oral, Once, Nayeli Orozco, SEAN    dextromethorphan-guaifenesin  mg/5 ml liquid 10 mL, 10 mL, Oral, Q4H PRN, Charissa Abraham PA-C    dextrose 10% (D10W) Bolus, 12.5 g, Intravenous, PRN, Michael Potter MD    dextrose 10% (D10W) Bolus, 25 g, Intravenous, PRN, Michael Potter MD    glucagon (human recombinant) injection 1 mg, 1 mg, Intramuscular, PRN, Charissa Chokyleeo, PA-C    glucose chewable tablet 16 g, 16 g, Oral, PRN, Charissa Chokyleeo, PA-C    glucose chewable tablet 24 g, 24 g, Oral, PRN, Charissa Chokyleeo, PA-C    hydrALAZINE tablet 25 mg, 25 mg, Oral, Q8H PRN, Charissa Chokyleeo PA-C    levetiracetam oral soln Soln 250 mg, 250 mg, Per G Tube, BID, Aleja Donovan, PA-C, 250 mg at 04/11/20 0807    melatonin tablet 6 mg, 6 mg, Oral, Nightly PRN, Charissa Peterseno, PA-C, 6 mg at 03/31/20 2255    metoclopramide HCl tablet 10 mg, 10 mg, Per G Tube, TID AC, Aleja Donovan, PA-C, 10 mg at 04/11/20 0935    midodrine tablet 5 mg, 5 mg, Per G Tube, Every Mon, Wed, Fri, Aleja Donovan PA-C, 5 mg at 04/10/20 1632    ondansetron disintegrating tablet 8 mg, 8 mg, Oral, Q8H PRN, Charissa Abraham PA-C, 8 mg at 04/10/20 1514    pantoprazole EC tablet 40 mg, 40 mg, Oral, Daily, Ray M. Kevin, MD, 40 mg at 04/10/20 2022    promethazine (PHENERGAN) 6.25 mg in dextrose 5 % 50 mL IVPB, 6.25 mg, Intravenous, Q6H PRN, Charissa Abraham PA-C    senna tablet 2 tablet, 2 tablet, Per G Tube, Daily, Aleja Donovan PA-C, 2 tablet at 04/11/20 0807    sodium chloride 0.9% flush 10 mL, 10 mL, Intravenous, PRN, Michael Potter MD      Physical Exam:      Intake/Output Summary (Last 24 hours) at 4/11/2020 1017  Last data filed at 4/10/2020 1025  Gross per 24 hour   Intake 550 ml   Output 1550 ml   Net -1000 ml     Wt Readings from Last  "3 Encounters:   03/28/20 54 kg (119 lb 0.8 oz)   03/25/20 54.4 kg (120 lb)   03/21/20 54.4 kg (120 lb)       BP (!) 138/91 (BP Location: Left arm, Patient Position: Lying)   Pulse 100   Temp 97.3 °F (36.3 °C) (Oral)   Resp 20   Ht 5' 6" (1.676 m)   Wt 54 kg (119 lb 0.8 oz)   SpO2 100%   BMI 19.21 kg/m²     GEN: NAD, not conversant  Resp: normal work of breathing   CV: no edema  MSK: L BKA  Neuro: awake, alert, will nod yes and no to some questions; not participatory in exam or questioning    Laboratory:  Lab Results   Component Value Date    [id number] Detected (A) 03/25/2020       No results for input(s): WBC, LYMPH, HGB, HCT, PLT in the last 168 hours.  Recent Labs   Lab 04/10/20  0721   *   K 3.8   CL 95   CO2 25   BUN 55*   CREATININE 7.0*   *   CALCIUM 8.8   PHOS 2.7     Recent Labs   Lab 04/10/20  0721   ALBUMIN 2.0*        No results for input(s): DDIMER, FERRITIN, CRP, LDH, BNP, TROPONINI, CPK in the last 72 hours.    Invalid input(s): PROCALCITONIN    All labs within the last 24 hours were reviewed.     Microbiology:  Microbiology Results (last 7 days)     ** No results found for the last 168 hours. **            Imaging  ECG Results          EKG 12-lead (Final result)  Result time 03/29/20 23:09:47    Final result by Interface, Lab In [facility] (03/29/20 23:09:47)                 Narrative:    Test Reason : R06.02,    Vent. Rate : 103 BPM     Atrial Rate : 103 BPM     P-R Int : 146 ms          QRS Dur : 098 ms      QT Int : 382 ms       P-R-T Axes : 089 -49 066 degrees     QTc Int : 500 ms    Sinus tachycardia  Right ventricular conduction delay  Left anterior fascicular block  Voltage criteria for left ventricular hypertrophy  Nonspecific T wave abnormality  Abnormal ECG  When compared with ECG of 25-MAR-2020 13:54,  The axis Shifted left  Nonspecific T wave abnormality no longer evident in Inferior leads  Confirmed by Zeke Burns MD ([id number]) on 3/29/2020 11:09:42 PM    Referred By: " [email or web address]   SELF           Confirmed By:Zeke Burns MD                              No results found for this or any previous visit.    X-Ray Chest AP Portable  Narrative: EXAMINATION:  XR CHEST AP PORTABLE    CLINICAL HISTORY:  Fever, COVID+;    TECHNIQUE:  Single frontal view of the chest was performed.    COMPARISON:  03/25/2020 and 03/21/2020    FINDINGS:  The patient is rotated on this exam limiting evaluation of the left lung.  Platelike airspace opacities are identified in the left upper and left lower lobes.  No focal airspace consolidation.  No pneumothorax or pleural effusion.  The cardiomediastinal silhouette is normal.    Incidental note is made of a right subclavian vascular stent.  The osseous and soft tissue structures are normal.  Impression: Findings compatible with platelike atelectasis in the left upper and lower lobes.  Overall no significant interval change.    Electronically signed by: Kassy Oro MD  Date:    03/28/2020  Time:    12:38      All imaging within the last 24 hours was reviewed.     Assessment and Plan:    Active Hospital Problems    Diagnosis  POA    *COVID-19 virus infection [U07.1]  Yes    Fever [R50.9]  Yes    ESRD on dialysis [N18.6, Z99.2]  Not Applicable    Seizure [R56.9]  Yes    Chronic blood loss anemia [D50.0]  Yes    Personal history of latent tuberculosis infection [Z86.15]  Yes    Severe malnutrition [E43]  Yes     Assessment and Plan  Severe Malnutrition in the context of Social/Environmental Circumstances     Related to (etiology):  Unknown etiology     Signs and Symptoms (as evidenced by):  Energy Intake: per pt, <75% intake over the last year  Body Fat Depletion: overall subcutaneous fat loss, moderate triceps fat loss and protruding patellas.  Muscle Mass Depletion:  moderate muscle wasting of interosseous, temporal, clavicle, calves and quadriceps  Weight Loss: 24% (39 lbs) x 1 year    Recommendations     Recommendation/Intervention: 1. Should pt  be cleared for po diet, recommend renal diet with texture per SLP along with Novasource ONS TID. 2. Should pt require enteral feeding, initiate Novasource renal formula at 10ml/hr and advance 10ml Q4hrs to goal rate of 40ml/hr (provides 1920kcal, 87g pro, 691ml free water). Provide additional 500ml water flush/24 hrs. Hold for residuals >500ml.  Goals: 1. Pt to receive nutrition < 48 hrs.      History of GI bleed [Z87.19]  Not Applicable    Renovascular hypertension [I15.0]  Yes     Chronic    Pulmonary hypertension associated with end-stage renal disease (ESRD) on dialysis [I27.29, N18.6, Z99.2]  Not Applicable     Chronic    Chronic diastolic heart failure [I50.32]  Yes     Chronic     2-23-17    1 - Low normal to mildly depressed left ventricular systolic function (EF 50-55%).     2 - Right ventricular enlargement with mildly depressed systolic function.     3 - Left ventricular diastolic dysfunction.     4 - Right atrial enlargement.     5 - Severe tricuspid regurgitation.     6 - Pulmonary hypertension. The estimated PA systolic pressure is 86 mmHg.     7 - Increased central venous pressure.       Secondary hyperparathyroidism of renal origin [N25.81]  Yes      Resolved Hospital Problems   No resolved problems to display.       Suspected Covid-19 Virus Infection  Person Under Investigation (PUI) for COVID-19  - COVID-19 testing: Collection Date: 3/25/2020 Collection Time:   2:10 PM  - Infection Control notified    - Isolation:   - Airborne and Droplet Precautions  - Surgical mask on patient   - N95 masks must be fit tested, wear eye protection  - 20 second hand hygiene   - Limit visitors per hospital policy   - Consolidate lab draws, nursing care, and interventions    - Diagnostics: (Lymphopenia, hyponatremia, hyperferritinemia, elevated troponin, elevated d-dimer, age, and comorbidities are significant predictors of poor clinical outcome)     - CBC: Hgb 10.1                     trend Q48hrs  - CMP: BUN  44, Cr 6.7           trend Q48hrs  - Procalcitonin: 2.24  - D-dimer: 0.98                                    repeat prior to discharge  - Ferritin: 1762                         repeat prior to discharge   - CRP: 185.1    >132                       trend Q48hrs downtrending  - LDH:  ordered                        repeat prior to discharge  - BNP: ordered  - Troponin: 1.344                      - ECG: estrella acute ischemic changes              - rapid Flu: negative on 3/21/20              - RIP only if BMT/solid transplant:              - Legionella antigen: ordered              - Blood culture x2: NGTD              - Sputum culture: ordered               - CXR: platelike atelectasis in the left upper and lower lobes              - UA and culture: ordered              - CPK: 202    - Management:   Bundle care as able to maintain isolation & minimize in/out of room   - Supplemental O2 to maintain SpO2 92%-96%   if requiring 6L NC or higher, place on nonrebreather and discuss case with MICU   - Telemetry & continuous pulse oximetry    - If wheezing   - albuterol inhaler 2-4 puff Q6hr PRN    - ipratropium daily    - acetaminophen 650mg PO Q6hr PRN fever   - loperamide PRN for viral diarrhea  - Empiric antibiotics per likely source & patient allergies    - CAP: x 5 day course (d/c early if low concern for bacterial co-infection)  Ceftriaxone 1g IV Q24hrs            Azithromycin 500mg IV day #1, then 250mg PO daily x4 days     If azithromycin is not available, start doxycycline                 If MRSA risk factors, add Vancomycin IV (PharmD consult)   - Investigational Treatment Protocol: (if patient meets criteria)   [email or web address]     - statin: atorvastatin 40mg po daily (if CPK WNL)    - start HCQ 400mg PO BID x1 day, then 400mg PO daily x 4 days   (check glucose 6 phosphate dehydrogenase (NOT G6PD Quant), ECG, and start  Qshift POCT glucose)    Safety notes:   - Avoid NIPPV (CPAP/BiPAP) to prevent aerosolization, use on a case-by-case basis if in neg pressure room   - Cautious use of NSAIDS for fever per WHO recommendations (3/16/2020)   - No new ACEi/ARB start or discontinuation of chronic med unless hypotensive (Esler et al. Journal of Hypertension 2020, 38:000-000)   - Careful use of steroids in the absence of other indications (shock, ARDS)   - Fluid sparing resuscitation, avoid maintenance fluids     Advance Care Planning  Goals of care, counseling/discussion In light of the patients advanced and life limiting illness,I engaged the the patient's sister, Alicia Retana, in a conversation about the patient's preferences for care  at the very end of life. The patient wishes to have a natural, peaceful death.  Along those lines, the patient does not wish to have CPR or other invasive treatments performed when his heart and/or breathing stops. I communicated to Alicia Retana that a DNR form was completed and will be scanned into EPIC.  I spent a total of 20 minutes engaging the patient in this advance care planning discussion.    Elevated Troponin  Trop 1.3>1.0  EKG no acute ischemic changes  Patient not complaining of CP  Suspect elevated in setting of COVID19 infection, ESRD     Renovascular Hypertension              Pulmonary Hypertension with ESRD on Dialysis   Continue regular dialysis MWF  Continue home hydralazine prn SBP >170  BP controlled on admission     History of GI bleed   Chronic blood loss anemia   Hgb 10.1 at baseline  Continue home Protonix     Seizures  Continue home Keppra      Severe malnutrition   Nursing home confirmed patient eats orally and receives supplemental Novasource through PEG   Will continue Novasource 50 cc/hr x 10 hrs    VTE High Risk Prophylaxis: enoxaparin 40mg sq QHS @ 2100 (bundled care) if GFR >30    Patient's chronic/stable medical conditions noted in the problem list above will be  managed with the patient's home medications as tolerated.       Subsequent Inpatient Hospital CareLevel [phone] [id number] Total visit time was 35 minutes or greater with greater than 50% of time spent in counseling and coordination of care.   Ray Brown M.D.

## 2020-04-11 NOTE — PLAN OF CARE
-Pt free from fall or injury so far this shift. Instructed to call if assistance needed.  -Cardiac monitoring in progress, currently ST.   -Sats 100% on RA.  -Attempted to feed pt lunch, but pt began coughing/choking after eating one spoonful of rice, feeding stopped in fear pt may be aspirating. Notified Dr. Brown.   -No new skin breakdown noted. Position adjusted Q 2 hrs. Barrier cream applied to PI to sacrum.   -Afebrile. Covid precautions maintained.

## 2020-04-11 NOTE — PLAN OF CARE
Alert and oriented to person. - pt nonverbal but will respond to requests. VSS, denies pain.  Able to reposition himself in bed with staff assistance. Pt is incontinent however, outstanding for BM as well as being anuric. Pt remains free from falls. Bed locked and lowered. Bed alarm maintained. Personal items & call light w/in reach. NADN.

## 2020-04-12 NOTE — PROGRESS NOTES
"Hospital Medicine  Progress Note  [facility]      Patient Name: Vaughn Retana  MRN:  [id number]  Hospital Medicine Team: VIRTUAL HOSPITAL MEDICINE TEAM 7 Ray Brown MD  Date of Admission:  3/28/2020     Length of Stay:  LOS: 8 days       Principal Problem:  COVID-19 virus infection      Hospital Course:  Patient admitted from NH for of COVID-19. Has maintained sats ORA       This service was provided through telemedicine.  Visit type: Virtual visit with synchronous audio and video  Start time: 10:10  aint: SOB  The patient location is: [address] [place] [address] A  Present with the patient at the time of the telemedicine/virtual assessment:   End time: 10:15  Total time spent with patient: 5 min    Interval History:  4/12 Plan on dc to NH on MOnday, stable.  4/11 DC to NH on Monday, Stable.  4/10 DC to NH on Monday 4/09 Doing well stable, dc to NH on Monday.  4/07 stable, no recent labs, Afebrile on RA. Awai tplacement.  4/06 Ready for DC to SNF. Stable.4/05 afebrile CRP of 200 on 4/03, On RA satting at 100%.DNR. readay for DC to NH.  4/02 Ready for DC to SNF No reported events overnight. Maintaining sats ORA. Pt still not very cooperative with exam, he shakes his head no to ROS questions. Per CM, [facility] is not accepting previous nursing home patients. Will await updates  Review of Systems:  Patient shakes head "no" when asked about shortness of breath, chest pain, or cough.     Inpatient Medications:    Current Facility-Administered Medications:     0.9%  NaCl infusion, , Intravenous, PRN, Nayeli Orozco, FNP    0.9%  NaCl infusion, , Intravenous, Once, Nayeli Orozco, FNP    0.9%  NaCl infusion, , Intravenous, PRN, Nayeli Orozco, FNP    0.9%  NaCl infusion, , Intravenous, PRN, Nayeli Orozco, FNP    0.9%  NaCl infusion, , Intravenous, PRN, Nayeli Orozco, FNP    acetaminophen tablet 650 mg, 650 mg, Oral, Q4H PRN, Charissa Abraham PA-C, 650 mg at 04/11/20 " 0201    albuterol inhaler 2 puff, 2 puff, Inhalation, Q6H PRN **AND** MDI Q6H PRN, , , Q6H PRN, Ricky Morgan MD    atorvastatin tablet 40 mg, 40 mg, Per G Tube, QHS, Aleja Donovan, PA-C, 40 mg at 04/11/20 2014    cinacalcet tablet 60 mg, 60 mg, Oral, Once, Nayeli Orozco, SEAN    dextromethorphan-guaifenesin  mg/5 ml liquid 10 mL, 10 mL, Oral, Q4H PRN, Charissa Abraham PA-C    dextrose 10% (D10W) Bolus, 12.5 g, Intravenous, PRN, Michael Potter MD    dextrose 10% (D10W) Bolus, 25 g, Intravenous, PRN, Michael Potter MD    glucagon (human recombinant) injection 1 mg, 1 mg, Intramuscular, PRN, Charissa Abraham PA-C    glucose chewable tablet 16 g, 16 g, Oral, PRN, Charissa Abraham PA-C    glucose chewable tablet 24 g, 24 g, Oral, PRN, Charissa Abraham PA-C    hydrALAZINE tablet 25 mg, 25 mg, Oral, Q8H PRN, Charissa Abraham PA-C    levetiracetam oral soln Soln 250 mg, 250 mg, Per G Tube, BID, Aleja Donovan PA-C, 250 mg at 04/12/20 1038    melatonin tablet 6 mg, 6 mg, Oral, Nightly PRN, Charissa Abraham PA-C, 6 mg at 03/31/20 2255    metoclopramide HCl tablet 10 mg, 10 mg, Per G Tube, TID AC, Aleja Donovan PA-C, 10 mg at 04/12/20 0558    midodrine tablet 5 mg, 5 mg, Per G Tube, Every Mon, Wed, Fri, Aleja Donovan PA-C, 5 mg at 04/10/20 1632    ondansetron disintegrating tablet 8 mg, 8 mg, Oral, Q8H PRN, Charissa Abraham PA-C, 8 mg at 04/10/20 1514    pantoprazole EC tablet 40 mg, 40 mg, Oral, Daily, Ray Brown MD, 40 mg at 04/11/20 2014    promethazine (PHENERGAN) 6.25 mg in dextrose 5 % 50 mL IVPB, 6.25 mg, Intravenous, Q6H PRN, Charissa Abraham PA-C    senna tablet 2 tablet, 2 tablet, Per G Tube, Daily, Aleja Donovan PA-C, 2 tablet at 04/12/20 1039    sodium chloride 0.9% flush 10 mL, 10 mL, Intravenous, PRN, Michael Potter MD      Physical Exam:      Intake/Output Summary (Last 24 hours) at 4/12/2020 1319  Last data filed at 4/12/2020 0400  Gross per 24 hour   Intake 330 ml   Output --  "  Net 330 ml     Wt Readings from Last 3 Encounters:   03/28/20 54 kg (119 lb 0.8 oz)   03/25/20 54.4 kg (120 lb)   03/21/20 54.4 kg (120 lb)       /75 (BP Location: Left arm, Patient Position: Lying)   Pulse 89   Temp 96.6 °F (35.9 °C) (Oral)   Resp 20   Ht 5' 6" (1.676 m)   Wt 54 kg (119 lb 0.8 oz)   SpO2 100%   BMI 19.21 kg/m²     GEN: NAD, not conversant  Resp: normal work of breathing   CV: no edema  MSK: L BKA  Neuro: awake, alert, will nod yes and no to some questions; not participatory in exam or questioning    Laboratory:  Lab Results   Component Value Date    [id number] Detected (A) 03/25/2020       No results for input(s): WBC, LYMPH, HGB, HCT, PLT in the last 168 hours.  Recent Labs   Lab 04/10/20  0721   *   K 3.8   CL 95   CO2 25   BUN 55*   CREATININE 7.0*   *   CALCIUM 8.8   PHOS 2.7     Recent Labs   Lab 04/10/20  0721   ALBUMIN 2.0*        No results for input(s): DDIMER, FERRITIN, CRP, LDH, BNP, TROPONINI, CPK in the last 72 hours.    Invalid input(s): PROCALCITONIN    All labs within the last 24 hours were reviewed.     Microbiology:  Microbiology Results (last 7 days)     ** No results found for the last 168 hours. **            Imaging  ECG Results          EKG 12-lead (Final result)  Result time 03/29/20 23:09:47    Final result by Interface, Lab In [facility] (03/29/20 23:09:47)                 Narrative:    Test Reason : R06.02,    Vent. Rate : 103 BPM     Atrial Rate : 103 BPM     P-R Int : 146 ms          QRS Dur : 098 ms      QT Int : 382 ms       P-R-T Axes : 089 -49 066 degrees     QTc Int : 500 ms    Sinus tachycardia  Right ventricular conduction delay  Left anterior fascicular block  Voltage criteria for left ventricular hypertrophy  Nonspecific T wave abnormality  Abnormal ECG  When compared with ECG of 25-MAR-2020 13:54,  The axis Shifted left  Nonspecific T wave abnormality no longer evident in Inferior leads  Confirmed by Zeke Burns MD ([id number]) on " 3/29/2020 11:09:42 PM    Referred By: KHARI   SELF           Confirmed By:Zeke Burns MD                              No results found for this or any previous visit.    X-Ray Chest AP Portable  Narrative: EXAMINATION:  XR CHEST AP PORTABLE    CLINICAL HISTORY:  Fever, COVID+;    TECHNIQUE:  Single frontal view of the chest was performed.    COMPARISON:  03/25/2020 and 03/21/2020    FINDINGS:  The patient is rotated on this exam limiting evaluation of the left lung.  Platelike airspace opacities are identified in the left upper and left lower lobes.  No focal airspace consolidation.  No pneumothorax or pleural effusion.  The cardiomediastinal silhouette is normal.    Incidental note is made of a right subclavian vascular stent.  The osseous and soft tissue structures are normal.  Impression: Findings compatible with platelike atelectasis in the left upper and lower lobes.  Overall no significant interval change.    Electronically signed by: Kassy Oro MD  Date:    03/28/2020  Time:    12:38      All imaging within the last 24 hours was reviewed.     Assessment and Plan:    Active Hospital Problems    Diagnosis  POA    *COVID-19 virus infection [U07.1]  Yes    Fever [R50.9]  Yes    ESRD on dialysis [N18.6, Z99.2]  Not Applicable    Seizure [R56.9]  Yes    Chronic blood loss anemia [D50.0]  Yes    Personal history of latent tuberculosis infection [Z86.15]  Yes    Severe malnutrition [E43]  Yes     Assessment and Plan  Severe Malnutrition in the context of Social/Environmental Circumstances     Related to (etiology):  Unknown etiology     Signs and Symptoms (as evidenced by):  Energy Intake: per pt, <75% intake over the last year  Body Fat Depletion: overall subcutaneous fat loss, moderate triceps fat loss and protruding patellas.  Muscle Mass Depletion:  moderate muscle wasting of interosseous, temporal, clavicle, calves and quadriceps  Weight Loss: 24% (39 lbs) x 1  year    Recommendations     Recommendation/Intervention: 1. Should pt be cleared for po diet, recommend renal diet with texture per SLP along with Novasource ONS TID. 2. Should pt require enteral feeding, initiate Novasource renal formula at 10ml/hr and advance 10ml Q4hrs to goal rate of 40ml/hr (provides 1920kcal, 87g pro, 691ml free water). Provide additional 500ml water flush/24 hrs. Hold for residuals >500ml.  Goals: 1. Pt to receive nutrition < 48 hrs.      History of GI bleed [Z87.19]  Not Applicable    Renovascular hypertension [I15.0]  Yes     Chronic    Pulmonary hypertension associated with end-stage renal disease (ESRD) on dialysis [I27.29, N18.6, Z99.2]  Not Applicable     Chronic    Chronic diastolic heart failure [I50.32]  Yes     Chronic     2-23-17    1 - Low normal to mildly depressed left ventricular systolic function (EF 50-55%).     2 - Right ventricular enlargement with mildly depressed systolic function.     3 - Left ventricular diastolic dysfunction.     4 - Right atrial enlargement.     5 - Severe tricuspid regurgitation.     6 - Pulmonary hypertension. The estimated PA systolic pressure is 86 mmHg.     7 - Increased central venous pressure.       Secondary hyperparathyroidism of renal origin [N25.81]  Yes      Resolved Hospital Problems   No resolved problems to display.       Suspected Covid-19 Virus Infection  Person Under Investigation (PUI) for COVID-19  - COVID-19 testing: Collection Date: 3/25/2020 Collection Time:   2:10 PM  - Infection Control notified    - Isolation:   - Airborne and Droplet Precautions  - Surgical mask on patient   - N95 masks must be fit tested, wear eye protection  - 20 second hand hygiene   - Limit visitors per hospital policy   - Consolidate lab draws, nursing care, and interventions    - Diagnostics: (Lymphopenia, hyponatremia, hyperferritinemia, elevated troponin, elevated d-dimer, age, and comorbidities are significant predictors of poor clinical  outcome)     - CBC: Hgb 10.1                     trend Q48hrs  - CMP: BUN 44, Cr 6.7           trend Q48hrs  - Procalcitonin: 2.24  - D-dimer: 0.98                                    repeat prior to discharge  - Ferritin: 1762                         repeat prior to discharge   - CRP: 185.1    >132                       trend Q48hrs downtrending  - LDH:  ordered                        repeat prior to discharge  - BNP: ordered  - Troponin: 1.344                      - ECG: estrella acute ischemic changes              - rapid Flu: negative on 3/21/20              - RIP only if BMT/solid transplant:              - Legionella antigen: ordered              - Blood culture x2: NGTD              - Sputum culture: ordered               - CXR: platelike atelectasis in the left upper and lower lobes              - UA and culture: ordered              - CPK: 202    - Management:   Bundle care as able to maintain isolation & minimize in/out of room   - Supplemental O2 to maintain SpO2 92%-96%   if requiring 6L NC or higher, place on nonrebreather and discuss case with MICU   - Telemetry & continuous pulse oximetry    - If wheezing   - albuterol inhaler 2-4 puff Q6hr PRN    - ipratropium daily    - acetaminophen 650mg PO Q6hr PRN fever   - loperamide PRN for viral diarrhea  - Empiric antibiotics per likely source & patient allergies    - CAP: x 5 day course (d/c early if low concern for bacterial co-infection)  Ceftriaxone 1g IV Q24hrs            Azithromycin 500mg IV day #1, then 250mg PO daily x4 days     If azithromycin is not available, start doxycycline                 If MRSA risk factors, add Vancomycin IV (PharmD consult)   - Investigational Treatment Protocol: (if patient meets criteria)   [email or web address]     - statin: atorvastatin 40mg po daily (if CPK WNL)    - start HCQ 400mg PO BID x1 day, then 400mg PO daily x 4 days    (check glucose 6 phosphate dehydrogenase (NOT G6PD Quant), ECG, and start Qshift POCT glucose)    Safety notes:   - Avoid NIPPV (CPAP/BiPAP) to prevent aerosolization, use on a case-by-case basis if in neg pressure room   - Cautious use of NSAIDS for fever per WHO recommendations (3/16/2020)   - No new ACEi/ARB start or discontinuation of chronic med unless hypotensive (Esler et al. Journal of Hypertension 2020, 38:000-000)   - Careful use of steroids in the absence of other indications (shock, ARDS)   - Fluid sparing resuscitation, avoid maintenance fluids     Advance Care Planning  Goals of care, counseling/discussion In light of the patients advanced and life limiting illness,I engaged the the patient's sister, Alicia Retana, in a conversation about the patient's preferences for care  at the very end of life. The patient wishes to have a natural, peaceful death.  Along those lines, the patient does not wish to have CPR or other invasive treatments performed when his heart and/or breathing stops. I communicated to Alicia Retana that a DNR form was completed and will be scanned into EPIC.  I spent a total of 20 minutes engaging the patient in this advance care planning discussion.    Elevated Troponin  Trop 1.3>1.0  EKG no acute ischemic changes  Patient not complaining of CP  Suspect elevated in setting of COVID19 infection, ESRD     Renovascular Hypertension              Pulmonary Hypertension with ESRD on Dialysis   Continue regular dialysis MWF  Continue home hydralazine prn SBP >170  BP controlled on admission     History of GI bleed   Chronic blood loss anemia   Hgb 10.1 at baseline  Continue home Protonix     Seizures  Continue home Keppra      Severe malnutrition   Nursing home confirmed patient eats orally and receives supplemental Novasource through PEG   Will continue Novasource 50 cc/hr x 10 hrs    VTE High Risk Prophylaxis: enoxaparin 40mg sq QHS @ 2100 (bundled care) if GFR  >30    Patient's chronic/stable medical conditions noted in the problem list above will be managed with the patient's home medications as tolerated.       Subsequent Inpatient Hospital CareLevel 3 [id number] Total visit time was 35 minutes or greater with greater than 50% of time spent in counseling and coordination of care.   Ray Brown M.D.

## 2020-04-12 NOTE — PLAN OF CARE
Pt did not verbalized understanding plan of care.Q2 turns done and barrier cream applied to sacral area as order. Tmax 97.3.O2 RA-95%.Frequent assessments done and fall precautions maintained. Pain and discomfort controlled. Will continue to monitor.

## 2020-04-13 NOTE — CONSULTS
Placed 18g, 8 cm Midline in left brachial vein using u/s guidance.  Max dwell date 5/12/2020.  Lot # [id number].

## 2020-04-13 NOTE — PLAN OF CARE
Patient transfer to HD this am. No fluid removed d/t low B/P. Patient has a midline in place. Npo. Tolerating Novosource Renal. No c/o pain. Turn q2hr . No distress noted at this time.

## 2020-04-13 NOTE — PLAN OF CARE
Per Dr. Brown pt will d/c tomorrow back to [facility], SW will update  supervisor and NH.

## 2020-04-13 NOTE — PROGRESS NOTES
Hemodialysis Note  Pt seen and evaluated while undergoing dialysis. Not tolerating dialysis with current UFR which may affect net fluid removal: hypotensive; on midodrine MWF Q PM; Hgb 6.5 (discussed with Hospital Medicine).  Labs reviewed and dialysate bath adjusted.     BFR: 400  Anemia: Hgb 6.5; ferritin 1762 3/28/20; PMHx: GIB; managed by Hospital Medicine  MBD: Corrected Ca+ 9.7; on cincalcet; Ph- 2.7  Diet: current NPO  -receiving Novasource with tube feeds

## 2020-04-13 NOTE — PROGRESS NOTES
HD treatment complete. Duration of treatment 2.5 hours and no fluid removed due to low blood pressure. No complications with access to right upper arm. Needles removed and hemostasis achieved. Thrill and bruit present.

## 2020-04-13 NOTE — PLAN OF CARE
[facility]     Department of Hospital Medicine     [address] [place] [address]     [phone] after hours  [phone] fax       NURSING HOME ORDERS    Patient Name: Vaughn Retana  YOB: 1965    04/15/2020  Admit to Nursing Home:  Regular Bed                                                     Diagnoses:  Active Hospital Problems    Diagnosis  POA    *COVID-19 virus infection [U07.1]  Yes    Fever [R50.9]  Yes    ESRD on dialysis [N18.6, Z99.2]  Not Applicable    Seizure [R56.9]  Yes    Chronic blood loss anemia [D50.0]  Yes    Personal history of latent tuberculosis infection [Z86.15]  Yes    Severe malnutrition [E43]  Yes     Assessment and Plan  Severe Malnutrition in the context of Social/Environmental Circumstances     Related to (etiology):  Unknown etiology     Signs and Symptoms (as evidenced by):  Energy Intake: per pt, <75% intake over the last year  Body Fat Depletion: overall subcutaneous fat loss, moderate triceps fat loss and protruding patellas.  Muscle Mass Depletion:  moderate muscle wasting of interosseous, temporal, clavicle, calves and quadriceps  Weight Loss: 24% (39 lbs) x 1 year    Recommendations     Recommendation/Intervention: 1. Should pt be cleared for po diet, recommend renal diet with texture per SLP along with Novasource ONS TID. 2. Should pt require enteral feeding, initiate Novasource renal formula at 10ml/hr and advance 10ml Q4hrs to goal rate of 40ml/hr (provides 1920kcal, 87g pro, 691ml free water). Provide additional 500ml water flush/24 hrs. Hold for residuals >500ml.  Goals: 1. Pt to receive nutrition < 48 hrs.      History of GI bleed [Z87.19]  Not Applicable    Renovascular hypertension [I15.0]  Yes     Chronic    Pulmonary hypertension associated with end-stage renal disease (ESRD) on dialysis [I27.29, N18.6, Z99.2]  Not Applicable     Chronic    Chronic diastolic heart failure [I50.32]   Yes     Chronic     2-23-17    1 - Low normal to mildly depressed left ventricular systolic function (EF 50-55%).     2 - Right ventricular enlargement with mildly depressed systolic function.     3 - Left ventricular diastolic dysfunction.     4 - Right atrial enlargement.     5 - Severe tricuspid regurgitation.     6 - Pulmonary hypertension. The estimated PA systolic pressure is 86 mmHg.     7 - Increased central venous pressure.       Secondary hyperparathyroidism of renal origin [N25.81]  Yes      Resolved Hospital Problems   No resolved problems to display.       Patient is homebound due to:  COVID-19 virus infection    Allergies:  Review of patient's allergies indicates:   Allergen Reactions    Fosrenol [lanthanum] Nausea And Vomiting     Nausea and vomiting       Vitals:      Routine, once monthly      Diet: renal diet, as tolerated, eats very little     Tube Feeding:        Novasource  - Continuous at 60mL per hour, for 10  hours per day    - Flush tube with 150mL water every 8 hours    Acitivities:           LABS:  Per facility protocol                          Vaughn Retana   Home Medication Instructions ANABEL:[id number]    Printed on:04/13/20 2799   Medication Information                      acetaminophen (TYLENOL) 325 MG tablet  Take 2 tablets (650 mg total) by mouth every 8 (eight) hours as needed.             folic acid/vit B complex and C (RENAL VITAMIN ORAL)  Take 1 by mouth.Daily             hydrALAZINE (APRESOLINE) 50 MG tablet  Take 0.5 tablets (25 mg total) by mouth every 8 (eight) hours as needed (for SBP > 170).             levETIRAcetam (KEPPRA) 100 mg/mL Soln  Take 2.5 mLs (250 mg total) by mouth 2 (two) times daily.             metoclopramide HCl (REGLAN) 10 MG tablet  Take 10 mg by mouth 4 (four) times daily.             midodrine (PROAMATINE) 5 MG Tab  Take 1 tablet (5 mg total) by mouth every Mon, Wed, Fri. Please take 30 min before dialysis on Monday Wednesday and Friday as  needed             ondansetron (ZOFRAN) 8 MG tablet  Take 1 tablet (8 mg total) by mouth every 12 (twelve) hours as needed for Nausea.             pantoprazole (PROTONIX) 40 MG tablet  Take 1 tablet (40 mg total) by mouth 2 (two) times daily.             promethazine (PHENERGAN) 25 MG tablet  Take 1 tablet (25 mg total) by mouth every 6 (six) hours as needed for Nausea.             RENAPLEX-D 800 mcg-12.5 mg -2,000 unit Tab  Take 1 tablet by mouth once daily.             senna (SENOKOT) 8.6 mg tablet  Take 2 tablets by mouth once daily.              sevelamer carbonate (RENVELA) 800 mg Tab  Take 1 tablet (800 mg total) by mouth 3 (three) times daily with meals.                       _________________________________  Ray Brown MD  04/13/2020

## 2020-04-13 NOTE — PLAN OF CARE
04/13/20 1241   Discharge Assessment   Assessment Type Discharge Planning Reassessment   Confirmed/corrected address and phone number on facesheet? Yes   Assessment information obtained from? Medical Record   Prior to hospitilization cognitive status: Alert/Oriented   Prior to hospitalization functional status: Assistive Equipment;Needs Assistance   Current cognitive status: Alert/Oriented   Current Functional Status: Assistive Equipment;Needs Assistance   Able to Return to Prior Arrangements yes   Patient's perception of discharge disposition nursing home   Do you have any problems affording any of your prescribed medications? No   Is the patient taking medications as prescribed? yes   Does the patient have transportation home? Yes   Dialysis Name and Scheduled days [facility]; TTH[facility]; 7:00 AM    Does the patient receive services at the [facility]? No   Discharge Plan A Return to nursing home   DME Needed Upon Discharge  none     SW spoke to Rosalie at [facility] who reported that NH orders are being reviewed by [facility].     Patient is scheduled for [place] [facility] [place] T, Th, R at 7:00 AM. Patient should arrive at facility for 6:30 AM.    Concepcion Gonzales LMSW  [facility]  [phone]

## 2020-04-13 NOTE — PLAN OF CARE
04/13/20 0921   Post-Acute Status   Post-Acute Authorization Other   Post-Acute Placement Status Pending Post-Acute Clinical Review     SW sent updated NH orders for review and acceptance back to facility.     Concepcion Gonzales LMSW  [facility]  [phone]

## 2020-04-14 NOTE — PROGRESS NOTES
"[facility]  Adult Nutrition  Progress Note    SUMMARY       Recommendations  1. Recommend modifying TF regimen to better meet pt's needs - Novasource Renal @ 35mL/hr x 24 hrs, to provide 1680 kcal, 76g protein, and 605mL free water. Additional fluid per MD.    - If 10hr regimen still desired, suggest increasing to goal rate of 85mL/hr x 10 hrs, to provide 1700kcal, 77g protein, and 612mL free water.   2. If able to advance diet, suggest renal.    - If PO intake > 50%, return to previous TF regimen of Novasource Renal @ 50mL/hr x 10 hrs.   3. RD to monitor.    Goals: meet % EEN/EPN by RD follow-up  Nutrition Goal Status: goal not met  Communication of RD Recs: reviewed with RN    Reason for Assessment    Reason For Assessment: RD follow-up  Diagnosis: (COVID-19 virus infection)  Relevant Medical History: HTN, intracranial bleed, ESRD on dialysis, L BKA, DVT, heart failure, hx upper GI bleeds, PEG status, seizures, latent TB  Interdisciplinary Rounds: did not attend  General Information Comments: RD working remotely, unable to reach pt. Per RN, pt has been tolerating TFs @ 50mL/hr x 10 hrs. Noting pt is now NPO, discussed increasing TF regimen with RN. No updated weight since 3/28. NFPE to be completed at a later date 2/2 COVID-19 precautions, however pt likely continues with moderate and severe wasting per previous NFPEs. Pt is at risk for malnutrition due to current TF regimen meeting < 75% EEN/EPN.  Nutrition Discharge Planning: Pt to discharge on TF with Novasource renal @50ml/hr as tolerated.    Nutrition Risk Screen    Nutrition Risk Screen: tube feeding or parenteral nutrition    Nutrition/Diet History    Spiritual, Cultural Beliefs, Jehovah's witness Practices, Values that Affect Care: no  Factors Affecting Nutritional Intake: decreased appetite  Nutrition Support Provision Prior to Admit: pt with PEG, likely with TF support PTA    Anthropometrics    Temp: 98.4 °F (36.9 °C)  Height: 5' 6" " (167.6 cm)  Height (inches): 66 in  Weight Method: Bed Scale  Weight: 54 kg (119 lb 0.8 oz)  Weight (lb): 119.05 lb  Ideal Body Weight (IBW), Male: 142 lb  % Ideal Body Weight, Male (lb): 83.84 %  BMI (Calculated): 19.2  BMI Grade: 18.5-24.9 - normal  Weight Loss: unintentional  Usual Body Weight (UBW), k.5 kg(per chart review 19)  % Usual Body Weight: 94.11  % Weight Change From Usual Weight: -6.09 %    Lab/Procedures/Meds    Pertinent Labs Reviewed: reviewed  Pertinent Labs Comments: Na 133, BUN 58, Cr 6.7, GFR 9.8, Glu 203, Ca 8.1, Alb 2.0, .1  Pertinent Medications Reviewed: reviewed  Pertinent Medications Comments: IVF, statin, reglan, ondansetron, pantoprazole, phenergan, senna    Estimated/Assessed Needs    Weight Used For Calorie Calculations: 54 kg (119 lb 0.8 oz)  Energy Calorie Requirements (kcal): 1646 kcal/day  Energy Need Method: [place]-St Jeor(x 1.25)  Protein Requirements: 64 gm/day(>1.2 gm/kg)  Weight Used For Protein Calculations: 54 kg (119 lb 0.8 oz)  Fluid Requirements (mL): 1 mL/kcal or per MD     RDA Method (mL): 1646    Nutrition Prescription Ordered    Current Diet Order: NPO  Current Nutrition Support Formula Ordered: Novasource Renal  Current Nutrition Support Rate Ordered: 50 (ml)  Current Nutrition Support Frequency Ordered: mL/hr x 10 hrs    Evaluation of Received Nutrient/Fluid Intake    Enteral Calories (kcal): 1000  Enteral Protein (gm): 45  Enteral (Free Water) Fluid (mL): 359  % Kcal Needs: 61  % Protein Needs: 64  Energy Calories Required: not meeting needs  Protein Required: not meeting needs  Fluid Required: (per MD)  Comments: LBM   Tolerance: tolerating  % Intake of Estimated Energy Needs: 50 - 75 %  % Meal Intake: NPO    Nutrition Risk    Level of Risk/Frequency of Follow-up: (2x/week)     Assessment and Plan    Nutrition Problem  Increased nutrient needs     Related to (etiology):   Previous diagnosis of severe malnutrition     Signs and Symptoms (as  evidenced by):   Pt with severe malnutrition per NFPE 2/14/20 has stable wt and tube feedings at NH since discharge but most likely continues with moderate to severe muscle wasting.     Interventions(treatment strategy):  Collaboration of care with providers.  Enteral nutrition: Novasource @50ml/hr x 10 hrs     Nutrition Diagnosis Status:   Continues    Monitor and Evaluation    Food and Nutrient Intake: energy intake, food and beverage intake, enteral nutrition intake  Food and Nutrient Adminstration: diet order, enteral and parenteral nutrition administration  Physical Activity and Function: nutrition-related ADLs and IADLs  Anthropometric Measurements: weight, weight change, body mass index  Biochemical Data, Medical Tests and Procedures: electrolyte and renal panel, gastrointestinal profile, glucose/endocrine profile, inflammatory profile, lipid profile  Nutrition-Focused Physical Findings: overall appearance     Malnutrition Assessment  Due to recent hospital wide restrictions to limit the transfer of COVID-19, we are not performing any physical exams at this time. All S/S will be observational; NFPE to be performed at a future date.    Nutrition Follow-Up    RD Follow-up?: Yes

## 2020-04-14 NOTE — PLAN OF CARE
CM received a follow-up call [facility], pt has been pending labs today, per Rosalie, pt will not be accepted today due to time, but can admit tomorrow.

## 2020-04-14 NOTE — PLAN OF CARE
Recommendations  1. Recommend modifying TF regimen to better meet pt's needs - Novasource Renal @ 35mL/hr x 24 hrs, to provide 1680 kcal, 76g protein, and 605mL free water. Additional fluid per MD.               - If 10hr regimen still desired, suggest increasing to goal rate of 85mL/hr x 10 hrs, to provide 1700kcal, 77g protein, and 612mL free water.   2. If able to advance diet, suggest renal.               - If PO intake > 50%, return to previous TF regimen of Novasource Renal @ 50mL/hr x 10 hrs.   3. RD to monitor.     Goals: meet % EEN/EPN by RD follow-up  Nutrition Goal Status: goal not met  Communication of RD Recs: reviewed with RN

## 2020-04-14 NOTE — PLAN OF CARE
Pt did not verbalized understanding plan of care. Tmax 98.4. O2 RA  %. Tolerating tube feeding.Q2 turn and barrier cream applied to sacral area. Frequent assessments done and fall precautions maintained. Pain and discomfort controlled. Will continue to monitor.

## 2020-04-14 NOTE — PLAN OF CARE
Blood transfusion in progress. No adverse reactions noted.   Turn Q2 hours.  Tele monitoring in use.  Tube feeding in progress. HOB elevated.  Denies complaints of pain and discomfort.

## 2020-04-15 NOTE — PROGRESS NOTES
Hemodialysis Note  Pt seen and evaluated while undergoing dialysis. Not tolerating dialysis with current UFR which may affect net fluid removal: hypotensive; on midodrine MWF Q PM; Hgb 6.5 (discussed with Hospital Medicine).  Labs reviewed and dialysate bath adjusted.      BFR: 400  UF goal: 2.5L as tolerated  -Most recent labs 4/13/20  Anemia: Hgb 9.2; ferritin 1762 3/28/20; PMHx: GIB; managed by Hospital Medicine  HTN: d/w HD RN to medicate with PRN hydralazine dose d/t elevated BP; BP trended down afterwards  MBD: Corrected Ca+ 9.7; on cincalcet; Ph- 2.7  Diet: current NPO  -receiving Novasource with tube feeds

## 2020-04-15 NOTE — PROGRESS NOTES
ESRD on iHD: + COVID-19 3/25/20; will refer to Yvrose, [facility] for assistance with HD placement.

## 2020-04-15 NOTE — NURSING
Discharge instructions reviewed with pt. Midline removed. Pt went for dialysis, 1.8L removed. Pt to be discharged to [facility]. Waiting for transportation via EMS.

## 2020-04-15 NOTE — CARE UPDATE
NATTY reached out to [facility] Admin. Cathy Ring, who states that this pt is scheduled for dialysis at [facility], 7AM TTS. NATTY will follow up with pt to see if transportation is needed. Will f/u.

## 2020-04-15 NOTE — PLAN OF CARE
04/15/20 1339   Final Note   Assessment Type Final Discharge Note   Anticipated Discharge Disposition nursing home Nu     Patient is discharging to [facility] as a shelter resident.    Patient is scheduled for dialysis at [facility] on T, Th, and S, 7:00 AM    SW arranged stretcher transport via [facility]. Requested  time is 5:30 PM.  Requested  time does not guarantee arrival time.      Nurse can call report to [phone]; ask for nurse for room 7A.    Nurse has been informed of above.    Concepcion Gonzales, LMSW [facility]  [phone]

## 2020-04-15 NOTE — PROGRESS NOTES
HD txt complete.  1.8L removed.  Tolerated well.  Needles removed and pressure dressing applied.

## 2020-04-16 NOTE — PROGRESS NOTES
04/16/20-Patient discharged on 04/15/20 to [facility] where he resides. Case Closure.

## 2020-04-17 NOTE — DISCHARGE SUMMARY
Discharge Summary  Hospital Medicine    Attending Provider on Discharge: Ray Brown MD  Hospital Medicine Team: [facility] MEDICINE TEAM 7  Date of Admission:  3/28/2020     Date of Discharge:  4/15/2020  Length of Stay:  LOS: 11 days   Code status: Full Code        Active Hospital Problems    Diagnosis  POA    *COVID-19 virus infection [U07.1]  Yes    Fever [R50.9]  Yes    ESRD on dialysis [N18.6, Z99.2]  Not Applicable    Seizure [R56.9]  Yes    Chronic blood loss anemia [D50.0]  Yes    Personal history of latent tuberculosis infection [Z86.15]  Yes    Severe malnutrition [E43]  Yes     Assessment and Plan  Severe Malnutrition in the context of Social/Environmental Circumstances     Related to (etiology):  Unknown etiology     Signs and Symptoms (as evidenced by):  Energy Intake: per pt, <75% intake over the last year  Body Fat Depletion: overall subcutaneous fat loss, moderate triceps fat loss and protruding patellas.  Muscle Mass Depletion:  moderate muscle wasting of interosseous, temporal, clavicle, calves and quadriceps  Weight Loss: 24% (39 lbs) x 1 year    Recommendations     Recommendation/Intervention: 1. Should pt be cleared for po diet, recommend renal diet with texture per SLP along with Novasource ONS TID. 2. Should pt require enteral feeding, initiate Novasource renal formula at 10ml/hr and advance 10ml Q4hrs to goal rate of 40ml/hr (provides 1920kcal, 87g pro, 691ml free water). Provide additional 500ml water flush/24 hrs. Hold for residuals >500ml.  Goals: 1. Pt to receive nutrition < 48 hrs.      History of GI bleed [Z87.19]  Not Applicable    Renovascular hypertension [I15.0]  Yes     Chronic    Pulmonary hypertension associated with end-stage renal disease (ESRD) on dialysis [I27.29, N18.6, Z99.2]  Not Applicable     Chronic    Chronic diastolic heart failure [I50.32]  Yes     Chronic     2-23-17    1 - Low normal to mildly depressed left ventricular systolic function (EF  50-55%).     2 - Right ventricular enlargement with mildly depressed systolic function.     3 - Left ventricular diastolic dysfunction.     4 - Right atrial enlargement.     5 - Severe tricuspid regurgitation.     6 - Pulmonary hypertension. The estimated PA systolic pressure is 86 mmHg.     7 - Increased central venous pressure.       Secondary hyperparathyroidism of renal origin [N25.81]  Yes      Resolved Hospital Problems   No resolved problems to display.        HPI 54 y.o. male with significant past medical history of hypertension, intracranial bleed, end-stage renal on dialysis MWF,L BKA 2/2 osteomyelitis, history of DVT, history of heart failure including pulmonary hypertension with normal EF (last 2017), hx upper GI bleeds, PEG status, seizures, latent TB presented from [facility] due to positive COVID-19 test (3/25/2020). Pt has had a recent hx of fever, nausea, and vomiting. He was in the ED for these symptoms on 3/16/20 and 3/21/20, workup unremarkable. Pt was transferred back to ED from [facility] today when his COVID-19 test resulted as positive. He denied shortness of breath, chest pain, fevers, cough, belly pain, nausea, vomiting to ED provider.  Pt unable to go over ROS with admitting provider as he was very lethargic.     In the ED, labs notable for elevated ferritin and CRP.  CXR findings compatible with platelike atelectasis in the left upper and lower lobes.  Overall no significant interval change.  COVID-19 test positive obtained on 3/25/2020.           Hospital Course    Despite having several comorbid abilities.  Patient had a remarkably good response to COVID the time of discharge is free of respiratory symptoms and is afebrile.    Prior to discharge patient had a moderate drop in his hemoglobin and was transfused 1 unit of blood.  He has a history of upper GI bleeds however this was not evident on exam.    Was decided to take conservative approach to his hemoglobin drop.   Patient will be discharged to [facility] on no oxygen and no antibiotics.       Recent Labs   Lab 04/13/20  0745 04/14/20  1504   WBC 1.52* 6.41   HGB 6.5* 9.2*   HCT 21.5* 28.8*    155     Recent Labs   Lab 04/13/20  0745   *   K 4.0   CL 98   CO2 24   BUN 58*   CREATININE 6.7*   *   CALCIUM 8.1*   PHOS 2.7     Recent Labs   Lab 04/13/20  0745   ALBUMIN 2.0*      No results for input(s): CPK, CPKMB, MB, TROPONINI in the last 72 hours.  No results for input(s): LACTATE in the last 72 hours.    No results for input(s): POCTGLUCOSE in the last 168 hours.   Hemoglobin A1C   Date Value Ref Range Status   02/06/2020 4.7 4.0 - 5.6 % Final     Comment:     ADA Screening Guidelines:  5.7-6.4%  Consistent with prediabetes  >or=6.5%  Consistent with diabetes  High levels of fetal hemoglobin interfere with the HbA1C  assay. Heterozygous hemoglobin variants (HbS, HgC, etc)do  not significantly interfere with this assay.   However, presence of multiple variants may affect accuracy.     11/09/2019 4.0 4.0 - 5.6 % Final     Comment:     ADA Screening Guidelines:  5.7-6.4%  Consistent with prediabetes  >or=6.5%  Consistent with diabetes  High levels of fetal hemoglobin interfere with the HbA1C  assay. Heterozygous hemoglobin variants (HbS, HgC, etc)do  not significantly interfere with this assay.   However, presence of multiple variants may affect accuracy.     06/22/2019 4.7 4.0 - 5.6 % Final     Comment:     ADA Screening Guidelines:  5.7-6.4%  Consistent with prediabetes  >or=6.5%  Consistent with diabetes  High levels of fetal hemoglobin interfere with the HbA1C  assay. Heterozygous hemoglobin variants (HbS, HgC, etc)do  not significantly interfere with this assay.   However, presence of multiple variants may affect accuracy.     06/22/2019 4.7 4.0 - 5.6 % Final     Comment:     ADA Screening Guidelines:  5.7-6.4%  Consistent with prediabetes  >or=6.5%  Consistent with diabetes  High levels  of fetal hemoglobin interfere with the HbA1C  assay. Heterozygous hemoglobin variants (HbS, HgC, etc)do  not significantly interfere with this assay.   However, presence of multiple variants may affect accuracy.         Procedures: none    Consultants:     Discharge Medication List as of 4/15/2020  4:44 PM      CONTINUE these medications which have NOT CHANGED    Details   acetaminophen (TYLENOL) 325 MG tablet Take 2 tablets (650 mg total) by mouth every 8 (eight) hours as needed., Starting Tue 8/27/2019, OTC      folic acid/vit B complex and C (RENAL VITAMIN ORAL) Take by mouth., Historical Med      levETIRAcetam (KEPPRA) 100 mg/mL Soln Take 2.5 mLs (250 mg total) by mouth 2 (two) times daily., Starting Tue 2/18/2020, Until Wed 2/17/2021, Normal      metoclopramide HCl (REGLAN) 10 MG tablet Take 10 mg by mouth 4 (four) times daily., Historical Med      midodrine (PROAMATINE) 5 MG Tab Take 1 tablet (5 mg total) by mouth every Mon, Wed, Fri. Please take 30 min before dialysis on Monday Wednesday and Friday as needed, Starting Fri 2/21/2020, Normal      ondansetron (ZOFRAN) 8 MG tablet Take 1 tablet (8 mg total) by mouth every 12 (twelve) hours as needed for Nausea., Starting Sat 9/7/2019, Normal      pantoprazole (PROTONIX) 40 MG tablet Take 1 tablet (40 mg total) by mouth 2 (two) times daily., Starting Tue 11/12/2019, Until Wed 11/11/2020, Normal      promethazine (PHENERGAN) 25 MG tablet Take 1 tablet (25 mg total) by mouth every 6 (six) hours as needed for Nausea., Starting Sat 3/21/2020, Print      senna (SENOKOT) 8.6 mg tablet Take 2 tablets by mouth once daily. , Historical Med      sevelamer carbonate (RENVELA) 800 mg Tab Take 1 tablet (800 mg total) by mouth 3 (three) times daily with meals., Starting Wed 10/2/2019, Until Thu 10/1/2020, Normal      hydrALAZINE (APRESOLINE) 50 MG tablet Take 0.5 tablets (25 mg total) by mouth every 8 (eight) hours as needed (for SBP > 170)., Starting Fri 2/21/2020, Until Sat  2/20/2021, Normal      RENAPLEX-D 800 mcg-12.5 mg -2,000 unit Tab Take 1 tablet by mouth once daily., Starting Thu 8/23/2018, Historical Med         STOP taking these medications       doxycycline (DORYX) 100 MG EC tablet Comments:   Reason for Stopping:               Discharge Diet:renal diet with     Activity: activity as tolerated    Discharge Condition: Good    Disposition: Home or Self Care    Tests pending at the time of discharge: none      Time spent  on the discharge of the patient including review of hospital course with the patient. reviewing discharge medications and arranging follow-up care 35 minutes    Discharge examination Patient was seen and examined on the date of discharge and determined to be suitable for discharge.    Discharge plan     No future appointments.    Ray Brown MD

## 2020-04-22 PROBLEM — K92.0 HEMATEMESIS: Status: RESOLVED | Noted: 2018-03-07 | Resolved: 2020-01-01

## 2020-04-22 PROBLEM — R50.9 FEVER: Status: RESOLVED | Noted: 2020-01-01 | Resolved: 2020-01-01

## 2020-04-22 PROBLEM — D72.829 LEUKOCYTOSIS: Status: ACTIVE | Noted: 2020-01-01

## 2020-04-22 PROBLEM — K92.2 ACUTE UPPER GI BLEED: Status: RESOLVED | Noted: 2020-01-01 | Resolved: 2020-01-01

## 2020-04-22 NOTE — Clinical Note
45 ml injected throughout the case. 55 mL total wasted during the case. 100 mL total used in the case.

## 2020-04-22 NOTE — ED TRIAGE NOTES
Pt is a 54 yr old male sent to the ED today with increased SOB. Pt tested positive for covid 3 weeks ago. Pt was 94% on RA at [facility]. Pt is now 100% on 1L NC. Pt has chronic aphasia from a previous nontraumatic intercerebral hemorrhage. Pt is tachy at 115. /78

## 2020-04-22 NOTE — PROGRESS NOTES
Visit not completed, pt inpatient again

## 2020-04-23 NOTE — PROGRESS NOTES
Pharmacokinetic Assessment Follow Up: IV Vancomycin  Patient with ESRD on iHD (TTS, last HD T 4/21/20) and plan for HD today 4/23 per nephro note  - The administration of vancomycin IV 1,000 mg once (initial dose scheduled for 4/22) was not recorded in Epic. Per [facility] documents, patient got discharged recently and received vancomycin at [facility] (last vanc record was on 4/18).    - Vancomycin random level today before HD: 7.8 mcg/mL subtherapeutic.   - Desired empiric serum trough concentration is 10 to 20 mcg/mL.  - Increase dose to vancomycin 1,250 mg IV once due at 21:00 today 4/23 and should be given post-HD  - Draw vancomycin random level on 04/25/2020 at with AM labs prior to HD.      Drug levels (last 3 results):  Recent Labs   Lab Result Units 04/23/20  1145   Vancomycin, Random ug/mL 7.8       Pharmacy will continue to follow and monitor vancomycin.    Please contact pharmacy at extension [phone] for questions regarding this assessment.    Thank you for the consult,   Dahiana Ramos       Patient brief summary:  Vaughn Retana is a 54 y.o. male initiated on antimicrobial therapy with IV Vancomycin for treatment of suspected pneumonia.       Drug Allergies:   Review of patient's allergies indicates:   Allergen Reactions    Fosrenol [lanthanum] Nausea And Vomiting     Nausea and vomiting       Actual Body Weight:   45 kg    Renal Function:   Estimated Creatinine Clearance: 8.5 mL/min (A) (based on SCr of 6.2 mg/dL (H)).,     Dialysis Method (if applicable):  iHD    CBC (last 72 hours):  Recent Labs   Lab Result Units 04/22/20  1849 04/23/20  1151   WBC K/uL 15.60* 15.73*   Hemoglobin g/dL 7.2* 8.9*   Hematocrit % 23.5* 27.4*   Platelets K/uL 239 201   Gran% % 83.4* 85.3*   Lymph% % 7.9* 6.9*   Mono% % 8.0 6.8   Eosinophil% % 0.1 0.3   Basophil% % 0.2 0.1   Differential Method  Automated Automated       Metabolic Panel (last 72 hours):  Recent Labs   Lab Result Units 04/22/20 2016   Sodium mmol/L 136   Potassium  mmol/L 4.3   Chloride mmol/L 99   CO2 mmol/L 23   Glucose mg/dL 115*   BUN, Bld mg/dL 50*   Creatinine mg/dL 6.2*   Albumin g/dL 1.9*   Total Bilirubin mg/dL 0.5   Alkaline Phosphatase U/L 79   AST U/L 49*   ALT U/L 14       Vancomycin Administrations:  vancomycin given in the last 96 hours      No antibiotic orders with administrations found.                Microbiologic Results:  Microbiology Results (last 7 days)     Procedure Component Value Units Date/Time    Blood culture x two cultures. Draw prior to antibiotics. [[id number]] Collected:  04/22/20 1919    Order Status:  Completed Specimen:  Blood from Peripheral, Forearm, Left Updated:  04/23/20 0315     Blood Culture, Routine No Growth to date    Narrative:       Aerobic and anaerobic    Blood culture x two cultures. Draw prior to antibiotics. [[id number]] Collected:  04/22/20 1849    Order Status:  Completed Specimen:  Blood from Peripheral, Antecubital, Left Updated:  04/23/20 0315     Blood Culture, Routine No Growth to date    Narrative:       Aerobic and anaerobic    Culture, Respiratory with Gram Stain [[id number]]     Order Status:  No result Specimen:  Sputum, Expectorated

## 2020-04-23 NOTE — PROGRESS NOTES
Pharmacokinetic Initial Assessment: IV Vancomycin    Assessment/Plan:    Initiate intravenous vancomycin with loading dose of 1000 mg, 20 mg/kg, once (ordered in ED) with subsequent doses when random concentrations are less than 20 mcg/mL.  Desired empiric serum trough concentration is 10 to 20 mcg/mL.  Draw vancomycin random level on 04/24/2020 at with AM labs prior to HD.  Pharmacy will continue to follow and monitor vancomycin.      Please contact pharmacy at extension [phone] with any questions regarding this assessment.     Thank you for the consult,   Edil Elkins       Patient brief summary:  Vaughn Retana is a 54 y.o. male initiated on antimicrobial therapy with IV Vancomycin for treatment of suspected pneumonia.    Drug Allergies:   Review of patient's allergies indicates:   Allergen Reactions    Fosrenol [lanthanum] Nausea And Vomiting     Nausea and vomiting       Actual Body Weight:   54 kg    Renal Function:   Estimated Creatinine Clearance: 10.3 mL/min (A) (based on SCr of 6.2 mg/dL (H)).     Dialysis Method (if applicable):  intermittent HD    CBC (last 72 hours):  Recent Labs   Lab Result Units 04/22/20  1849   WBC K/uL 15.60*   Hemoglobin g/dL 7.2*   Hematocrit % 23.5*   Platelets K/uL 239   Gran% % 83.4*   Lymph% % 7.9*   Mono% % 8.0   Eosinophil% % 0.1   Basophil% % 0.2   Differential Method  Automated       Metabolic Panel (last 72 hours):  Recent Labs   Lab Result Units 04/22/20 2016   Sodium mmol/L 136   Potassium mmol/L 4.3   Chloride mmol/L 99   CO2 mmol/L 23   Glucose mg/dL 115*   BUN, Bld mg/dL 50*   Creatinine mg/dL 6.2*   Albumin g/dL 1.9*   Total Bilirubin mg/dL 0.5   Alkaline Phosphatase U/L 79   AST U/L 49*   ALT U/L 14       Drug levels (last 3 results):  No results for input(s): VANCOMYCINRA, VANCOMYCINPE, VANCOMYCINTR in the last 72 hours.    Microbiologic Results:  Microbiology Results (last 7 days)     Procedure Component Value Units Date/Time    Culture, Respiratory  with Gram Stain [[id number]]     Order Status:  No result Specimen:  Sputum, Expectorated     Blood culture x two cultures. Draw prior to antibiotics. [[id number]] Collected:  04/22/20 1919    Order Status:  Sent Specimen:  Blood from Peripheral, Forearm, Left Updated:  04/22/20 1924    Blood culture x two cultures. Draw prior to antibiotics. [[id number]] Collected:  04/22/20 1849    Order Status:  Sent Specimen:  Blood from Peripheral, Antecubital, Left Updated:  04/22/20 1910

## 2020-04-23 NOTE — PROGRESS NOTES
Consult acknowledged. Chart reviewed. PMHx: ESRD on iHD (TTS, last HD T 4/21)--per ISMAEL Ch ([facility]). Plan for HD today. Full note and recommendations to follow.

## 2020-04-23 NOTE — HPI
Mr Retana is a 54 year old man with history of ESRD, L BKA 2/2 osteomyelitis, HFpEF, multiple ICH without deficits, gastroparesis, numerous hospitalizations due to esophagitis, who is presenting to the hospital on 4/22 due to dyspena, COVID. GI consulted due to concern for GI bleeding.    He was recently hospitalized at [facility] 3/28 - 4/15 due to COVID infection (first positive 3/25). Per chart review he did well while admitted, did not require intubation or ICU admission, and was ready for discharge on 4/2 and required admitted additional days while pending nursing home placement. Of note, prior to discharge he H&H was noted to decline from 8.0 (4/3) to 6.5 (4/13; no intervening labs) with greater than expected response to 1u (9.2) and no overt evidence of bleeding. Gi was not consulted.    Patient somnolent, will look at interviewer but not answering questions    He returned to hospital on 4/22 with concern for dyspnea (94% RA, 100% 2L). Per admit note patient did not provide significant history (primarily yes/no) due to aphasia. On admission, labs notable for Hgb 7.2, WBC 15, procal 5.3, COVID +. Normotensive, mildly tachycardic, on 2L saturating well. No AC/NSAID/antiplatelet agents on med list.    Of note, he has had multiple hospitalizations over the past 2-3 years due to coffee-grind emesis generally due to Gr D esophagitis. He has had multiple endoscopies include EGD (2/14/20) with non-bleeding erosive gastropathy. Last colonoscopy (4/2017) fair prep 2x polyps, due 2020.

## 2020-04-23 NOTE — HPI
55 y/o male with PMHx ESRD on iHD (TTS, last HD T 4/21/20), hospitalization for COVID-19 (3/28/20-4/15/20), HF, GIB requiring blood transfusion, PEG status, seizures, and latent TB presented from NH for evaluation of shortness of breath; admitted with COVID-19 infection

## 2020-04-23 NOTE — SUBJECTIVE & OBJECTIVE
Past Medical History:   Diagnosis Date    Amputation stump pain 9/10/2013    Aspiration pneumonia 7/27/2015    Asterixis 11/8/2016    C. difficile colitis 8/7/2015    Cholelithiasis without obstruction 8/25/2015    Chronic diastolic heart failure     2-23-17   1 - Low normal to mildly depressed left ventricular systolic function (EF 50-55%).    2 - Right ventricular enlargement with mildly depressed systolic function.    3 - Left ventricular diastolic dysfunction.    4 - Right atrial enlargement.    5 - Severe tricuspid regurgitation.    6 - Pulmonary hypertension. The estimated PA systolic pressure is 86 mmHg.    7 - Increased central venous pressure.    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    Metabolic acidosis, IAG, reduced excretion of inorganic acids     Myoclonic jerking 9/20/2016    Noncompliance with medication regimen 12/4/2018    Secondary hyperparathyroidism (of renal origin)     Secondary pulmonary hypertension 3/23/2017    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    TB lung, latent 08/25/2015    Negative Quantiferon Gold 3-23-17       Past Surgical History:   Procedure Laterality Date    COLONOSCOPY      COLONOSCOPY N/A 4/4/2017    Procedure: COLONOSCOPY;  Surgeon: Walker Stern MD;  Location: [facility] ([address] [place] [address]);  Service: Endoscopy;  Laterality: N/A;  PA Systolic Pressure 85.56. HD Patient MWF, K+ lab  prior to procedure.     ESOPHAGOGASTRODUODENOSCOPY N/A 6/12/2018    Procedure: EGD (ESOPHAGOGASTRODUODENOSCOPY);  Surgeon: Man Galicia MD;  Location: [facility] (2ND FLR);  Service: Endoscopy;  Laterality: N/A;  EGD in 8-12 weeks with Dr. Galicia on 4th floor for follow up erosive esophagitis and Mejia's surveillance.    Patient should be on Pantoprazole 40mg every 12 hours or the equivulant of another PPI    awaiting for patient to reply back regarding changing    ESOPHAGOGASTRODUODENOSCOPY N/A 3/7/2019    Procedure: EGD (ESOPHAGOGASTRODUODENOSCOPY);  Surgeon: Man Galicia MD;  Location: [facility] (2ND FLR);  Service: Endoscopy;  Laterality: N/A;    ESOPHAGOGASTRODUODENOSCOPY N/A 9/23/2019    Procedure: EGD (ESOPHAGOGASTRODUODENOSCOPY);  Surgeon: Keanu Rainey MD;  Location: [facility] (2ND FLR);  Service: Endoscopy;  Laterality: N/A;    ESOPHAGOGASTRODUODENOSCOPY N/A 10/2/2019    Procedure: EGD (ESOPHAGOGASTRODUODENOSCOPY);  Surgeon: Kevin De La Paz MD;  [facility]  Service: Endoscopy;  Laterality: N/A;    ESOPHAGOGASTRODUODENOSCOPY N/A 11/12/2019    Procedure: EGD (ESOPHAGOGASTRODUODENOSCOPY);  Surgeon: Kevin De La Paz MD;  [facility]  Service: Endoscopy;  Laterality: N/A;    ESOPHAGOGASTRODUODENOSCOPY N/A 2/14/2020    Procedure: EGD (ESOPHAGOGASTRODUODENOSCOPY);  Surgeon: Kevin De La Paz MD;  Location: [facility] (2ND FLR);  Service: Endoscopy;  Laterality: N/A;    FOOT AMPUTATION THROUGH METATARSAL      left foot    LEG AMPUTATION THROUGH KNEE  12/18/2013    left BKA    R AVF  9/12/12    UPPER GASTROINTESTINAL ENDOSCOPY         Review of patient's allergies indicates:   Allergen Reactions    Fosrenol [lanthanum] Nausea And Vomiting     Nausea and vomiting     Family History     Problem Relation (Age of Onset)    Alcohol abuse Maternal Grandmother    Diabetes Brother, Maternal Grandfather    Early death Mother    Heart disease Father     Hyperlipidemia Father    Hypertension Father, Sister    Kidney disease Father        Tobacco Use    Smoking status: Former Smoker     Packs/day: 1.00     Years: 10.00     Pack years: 10.00    Smokeless tobacco: Never Used   Substance and Sexual Activity    Alcohol use: No    Drug use: No    Sexual activity: Yes     Partners: Female     Birth control/protection: None     Review of Systems   Unable to perform ROS: Mental status change     Objective:     Vital Signs (Most Recent):  Temp: 97.8 °F (36.6 °C) (04/23/20 0530)  Pulse: (!) 126 (04/23/20 0100)  Resp: 20 (04/23/20 0100)  BP: 120/67 (04/23/20 0530)  SpO2: 100 % (04/23/20 0530) Vital Signs (24h Range):  Temp:  [97.1 °F (36.2 °C)-99.2 °F (37.3 °C)] 97.8 °F (36.6 °C)  Pulse:  [112-130] 126  Resp:  [16-21] 20  SpO2:  [93 %-100 %] 100 %  BP: (120-163)/(67-91) 120/67     Weight: 44.9 kg (98 lb 15.8 oz) (04/22/20 2318)  Body mass index is 15.98 kg/m².    No intake or output data in the 24 hours ending 04/23/20 1018    Lines/Drains/Airways     Drain                 Hemodialysis AV Fistula Right upper arm -- days         Hemodialysis AV Fistula Right upper arm -- days         Hemodialysis AV Fistula Right upper arm -- days         Gastrostomy/Enterostomy 02/12/20 0950 Gastrostomy tube w/ balloon midline 70 days          Peripheral Intravenous Line                 Peripheral IV - Single Lumen 04/22/20 1849 20 G Left Antecubital less than 1 day                Physical Exam   Constitutional: No distress.   Somnolent, will open eyes and look at interviewer but not answering questions   HENT:   Head: Normocephalic and atraumatic.   Mouth/Throat: Oropharynx is clear and moist. No oropharyngeal exudate.   Eyes: Conjunctivae are normal. No scleral icterus.   Neck: No JVD present.   Cardiovascular: Normal rate, regular rhythm, normal heart sounds and intact distal pulses.   Pulmonary/Chest: Effort normal and breath sounds normal. No respiratory distress.   Abdominal: Soft.  Bowel sounds are normal. He exhibits no distension and no mass. There is no tenderness. There is no rebound and no guarding.   Soft, non-tender, non-distended. PEG feeds running.   Musculoskeletal: He exhibits no edema or tenderness.   Lymphadenopathy:     He has no cervical adenopathy.   Neurological: He is alert.   Skin: Skin is warm. Capillary refill takes less than 2 seconds. He is not diaphoretic.   Nursing note and vitals reviewed.      Significant Labs:  All pertinent lab results from the last 24 hours have been reviewed.    Significant Imaging:  Imaging results within the past 24 hours have been reviewed.

## 2020-04-23 NOTE — CONSULTS
[facility]  Gastroenterology  Consult Note    Patient Name: Vaughn Retana  MRN: [id number]  Admission Date: 4/22/2020  Hospital Length of Stay: 1 days  Code Status: Full Code   Attending Provider: Vania Calzada MD   Consulting Provider: Yimi Jacques MD  Primary Care Physician: Cori Randall MD  Principal Problem:COVID-19 virus infection    Inpatient consult to Gastroenterology  Consult performed by: Yimi Jacques MD  Consult ordered by: Susan Turcios MD        Subjective:     HPI:  Mr Retana is a 54 year old man with history of ESRD, L BKA 2/2 osteomyelitis, HFpEF, multiple ICH without deficits, gastroparesis, numerous hospitalizations due to esophagitis, who is presenting to the hospital on 4/22 due to dyspena, COVID. GI consulted due to concern for GI bleeding.    He was recently hospitalized at [facility] 3/28 - 4/15 due to COVID infection (first positive 3/25). Per chart review he did well while admitted, did not require intubation or ICU admission, and was ready for discharge on 4/2 and required admitted additional days while pending nursing home placement. Of note, prior to discharge he H&H was noted to decline from 8.0 (4/3) to 6.5 (4/13; no intervening labs) with greater than expected response to 1u (9.2) and no overt evidence of bleeding. Gi was not consulted.    Patient somnolent, will look at interviewer but not answering questions    He returned to hospital on 4/22 with concern for dyspnea (94% RA, 100% 2L). Per admit note patient did not provide significant history (primarily yes/no) due to aphasia. On admission, labs notable for Hgb 7.2, WBC 15, procal 5.3, COVID +. Normotensive, mildly tachycardic, on 2L saturating well. No AC/NSAID/antiplatelet agents on med list.    Of note, he has had multiple hospitalizations over the past 2-3 years due to coffee-grind emesis generally due to Gr D esophagitis. He has had multiple endoscopies include EGD (2/14/20) with non-bleeding  erosive gastropathy. Last colonoscopy (4/2017) fair prep 2x polyps, due 2020.      Past Medical History:   Diagnosis Date    Amputation stump pain 9/10/2013    Aspiration pneumonia 7/27/2015    Asterixis 11/8/2016    C. difficile colitis 8/7/2015    Cholelithiasis without obstruction 8/25/2015    Chronic diastolic heart failure     2-23-17   1 - Low normal to mildly depressed left ventricular systolic function (EF 50-55%).    2 - Right ventricular enlargement with mildly depressed systolic function.    3 - Left ventricular diastolic dysfunction.    4 - Right atrial enlargement.    5 - Severe tricuspid regurgitation.    6 - Pulmonary hypertension. The estimated PA systolic pressure is 86 mmHg.    7 - Increased central venous pressure.     Chronic low back pain 12/1/2015    Closed head injury 9/8/2016    DVT (deep venous thrombosis) 7/28/2017    ESRD on hemodialysis 2/7/2013    MWF at [facility]   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    Metabolic acidosis, IAG, reduced excretion of inorganic acids     Myoclonic jerking 9/20/2016    Noncompliance with medication regimen 12/4/2018    Secondary hyperparathyroidism (of renal origin)     Secondary pulmonary hypertension 3/23/2017    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    TB lung, latent 08/25/2015    Negative Quantiferon Gold 3-23-17       Past Surgical History:   Procedure Laterality Date    COLONOSCOPY      COLONOSCOPY N/A 4/4/2017    Procedure: COLONOSCOPY;  Surgeon: Walker Stern MD;  Location: [facility] ([address] [place] [address]);  Service:  Endoscopy;  Laterality: N/A;  PA Systolic Pressure 85.56. HD Patient MWF, K+ lab prior to procedure.     ESOPHAGOGASTRODUODENOSCOPY N/A 6/12/2018    Procedure: EGD (ESOPHAGOGASTRODUODENOSCOPY);  Surgeon: Man Galicia MD;  Location: [address]);  Service: Endoscopy;  Laterality: N/A;  EGD in 8-12 weeks with Dr. Galicia on 4th floor for follow up erosive esophagitis and Mejia's surveillance.    Patient should be on Pantoprazole 40mg every 12 hours or the equivulant of another PPI    awaiting for patient to reply back regarding changing    ESOPHAGOGASTRODUODENOSCOPY N/A 3/7/2019    Procedure: EGD (ESOPHAGOGASTRODUODENOSCOPY);  Surgeon: aMn Galicia MD;  Location: [address]);  Service: Endoscopy;  Laterality: N/A;    ESOPHAGOGASTRODUODENOSCOPY N/A 9/23/2019    Procedure: EGD (ESOPHAGOGASTRODUODENOSCOPY);  Surgeon: Keanu Rainey MD;  Location: [facility] ([address] [place] [address]);  Service: Endoscopy;  Laterality: N/A;    ESOPHAGOGASTRODUODENOSCOPY N/A 10/2/2019    Procedure: EGD (ESOPHAGOGASTRODUODENOSCOPY);  Surgeon: Kevin De La Paz MD;  Location: [address]);  Service: Endoscopy;  Laterality: N/A;    ESOPHAGOGASTRODUODENOSCOPY N/A 11/12/2019    Procedure: EGD (ESOPHAGOGASTRODUODENOSCOPY);  Surgeon: Kevin De La Paz MD;  Location: [address]);  Service: Endoscopy;  Laterality: N/A;    ESOPHAGOGASTRODUODENOSCOPY N/A 2/14/2020    Procedure: EGD (ESOPHAGOGASTRODUODENOSCOPY);  Surgeon: Kevin De La Paz MD;  Location: [facility] ([address] [place] [address]);  Service: Endoscopy;  Laterality: N/A;    FOOT AMPUTATION THROUGH METATARSAL      left foot    LEG AMPUTATION THROUGH KNEE  12/18/2013    left BKA    R AVF  9/12/12    UPPER GASTROINTESTINAL ENDOSCOPY         Review of patient's allergies indicates:   Allergen Reactions    Fosrenol [lanthanum] Nausea And Vomiting     Nausea and vomiting     Family History     Problem Relation (Age of Onset)    Alcohol abuse Maternal Grandmother    Diabetes Brother,  Maternal Grandfather    Early death Mother    Heart disease Father    Hyperlipidemia Father    Hypertension Father, Sister    Kidney disease Father        Tobacco Use    Smoking status: Former Smoker     Packs/day: 1.00     Years: 10.00     Pack years: 10.00    Smokeless tobacco: Never Used   Substance and Sexual Activity    Alcohol use: No    Drug use: No    Sexual activity: Yes     Partners: Female     Birth control/protection: None     Review of Systems   Unable to perform ROS: Mental status change     Objective:     Vital Signs (Most Recent):  Temp: 97.8 °F (36.6 °C) (04/23/20 0530)  Pulse: (!) 126 (04/23/20 0100)  Resp: 20 (04/23/20 0100)  BP: 120/67 (04/23/20 0530)  SpO2: 100 % (04/23/20 0530) Vital Signs (24h Range):  Temp:  [97.1 °F (36.2 °C)-99.2 °F (37.3 °C)] 97.8 °F (36.6 °C)  Pulse:  [112-130] 126  Resp:  [16-21] 20  SpO2:  [93 %-100 %] 100 %  BP: (120-163)/(67-91) 120/67     Weight: 44.9 kg (98 lb 15.8 oz) (04/22/20 2318)  Body mass index is 15.98 kg/m².    No intake or output data in the 24 hours ending 04/23/20 1018    Lines/Drains/Airways     Drain                 Hemodialysis AV Fistula Right upper arm -- days         Hemodialysis AV Fistula Right upper arm -- days         Hemodialysis AV Fistula Right upper arm -- days         Gastrostomy/Enterostomy 02/12/20 0950 Gastrostomy tube w/ balloon midline 70 days          Peripheral Intravenous Line                 Peripheral IV - Single Lumen 04/22/20 1849 20 G Left Antecubital less than 1 day                Physical Exam   Constitutional: No distress.   Somnolent, will open eyes and look at interviewer but not answering questions   HENT:   Head: Normocephalic and atraumatic.   Mouth/Throat: Oropharynx is clear and moist. No oropharyngeal exudate.   Eyes: Conjunctivae are normal. No scleral icterus.   Neck: No JVD present.   Cardiovascular: Normal rate, regular rhythm, normal heart sounds and intact distal pulses.   Pulmonary/Chest: Effort normal  and breath sounds normal. No respiratory distress.   Abdominal: Soft. Bowel sounds are normal. He exhibits no distension and no mass. There is no tenderness. There is no rebound and no guarding.   Soft, non-tender, non-distended. PEG feeds running.   Musculoskeletal: He exhibits no edema or tenderness.   Lymphadenopathy:     He has no cervical adenopathy.   Neurological: He is alert.   Skin: Skin is warm. Capillary refill takes less than 2 seconds. He is not diaphoretic.   Nursing note and vitals reviewed.      Significant Labs:  All pertinent lab results from the last 24 hours have been reviewed.    Significant Imaging:  Imaging results within the past 24 hours have been reviewed.    Assessment/Plan:     Anemia in chronic kidney disease  Mr Retana is a 54 year old man with history of ESRD, L BKA 2/2 osteomyelitis, HFpEF, multiple ICH without deficits, gastroparesis, numerous hospitalizations due to esophagitis, who is presenting to the hospital on 4/22 due to dyspena, COVID. GI consulted due to concern for GI bleeding.    Multiple prior hospitalizations and Gi evaluations for coffee-grind emesis due to esophagitis. Most recent EGD (2/2020) only notable for erosive gastropathy. Prior negative colonoscopy 4/2017. Now admitted with dyspnea and covid, hemodynamically stable, no overt signs of bleeding, mild decline in H&H to 7.2. Recommend supportive care at this time and monitoring for bleeding.    Plan  - ok to resume tube feeds  - protonix 40mg BID  - monitor for signs of bleeding, no plan for repeat endoscopic evaluation at this time  - trend H&H, maintain hgb > 7        Thank you for your consult. I will follow-up with patient. Please contact us if you have any additional questions.    Yimi Jacques MD  Gastroenterology  [facility]

## 2020-04-23 NOTE — CONSULTS
[facility]  Nephrology  Consult Note    Patient Name: Vaughn Retana  MRN: [id number]  Admission Date: 4/22/2020  Hospital Length of Stay: 1 days  Attending Provider: Vania Calzada MD   Primary Care Physician: Cori Randall MD  Principal Problem:COVID-19 virus infection    Inpatient consult to Nephrology  Consult performed by: SEAN Santos  Consult ordered by: Susan Turcios MD  Reason for consult: ESRD Management        Subjective:     HPI: 55 y/o male with PMHx ESRD on iHD (TTS, last HD T 4/21/20), hospitalization for COVID-19 (3/28/20-4/15/20), HF, GIB requiring blood transfusion, PEG status, seizures, and latent TB presented from NH for evaluation of shortness of breath.    History obtained from the EMR d/t COVID-19 precautions and [facility]    Past Medical History:   Diagnosis Date    Amputation stump pain 9/10/2013    Aspiration pneumonia 7/27/2015    Asterixis 11/8/2016    C. difficile colitis 8/7/2015    Cholelithiasis without obstruction 8/25/2015    Chronic diastolic heart failure     2-23-17   1 - Low normal to mildly depressed left ventricular systolic function (EF 50-55%).    2 - Right ventricular enlargement with mildly depressed systolic function.    3 - Left ventricular diastolic dysfunction.    4 - Right atrial enlargement.    5 - Severe tricuspid regurgitation.    6 - Pulmonary hypertension. The estimated PA systolic pressure is 86 mmHg.    7 - Increased central venous pressure.    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 8/1/2015    Metabolic acidosis, IAG, reduced excretion of inorganic acids     Myoclonic jerking 9/20/2016    Noncompliance with medication regimen 12/4/2018    Secondary hyperparathyroidism (of renal origin)     Secondary pulmonary hypertension 3/23/2017    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    TB lung, latent 08/25/2015    Negative Quantiferon Gold 3-23-17       Past Surgical History:   Procedure Laterality Date    COLONOSCOPY      COLONOSCOPY N/A 4/4/2017    Procedure: COLONOSCOPY;  Surgeon: Walker Stern MD;  Location: [facility] (2ND FLR);  Service: Endoscopy;  Laterality: N/A;  PA Systolic Pressure 85.56. HD Patient MWF, K+ lab prior to procedure.     ESOPHAGOGASTRODUODENOSCOPY N/A 6/12/2018    Procedure: EGD (ESOPHAGOGASTRODUODENOSCOPY);  Surgeon: Man Galicia MD;  [facility]  Service: Endoscopy;  Laterality: N/A;  EGD in 8-12 weeks with Dr. Galicia on 4th floor for follow up erosive esophagitis and Mejia's surveillance.    Patient should be on Pantoprazole 40mg every 12 hours or the equivulant of another PPI    awaiting for patient to reply back regarding changing    ESOPHAGOGASTRODUODENOSCOPY N/A 3/7/2019    Procedure: EGD (ESOPHAGOGASTRODUODENOSCOPY);  Surgeon: Man Galicia MD;  [facility]  Service: Endoscopy;  Laterality: N/A;    ESOPHAGOGASTRODUODENOSCOPY N/A 9/23/2019    Procedure: EGD (ESOPHAGOGASTRODUODENOSCOPY);  Surgeon: Keanu Rainey MD;  Location: [facility] (2ND FLR);  Service: Endoscopy;  Laterality: N/A;    ESOPHAGOGASTRODUODENOSCOPY N/A 10/2/2019    Procedure: EGD (ESOPHAGOGASTRODUODENOSCOPY);  Surgeon: Kevin De La Paz MD;  [facility]  Service: Endoscopy;  Laterality: N/A;    ESOPHAGOGASTRODUODENOSCOPY N/A 11/12/2019    Procedure: EGD  (ESOPHAGOGASTRODUODENOSCOPY);  Surgeon: Kevin De La Paz MD;  Location: [facility] (2ND FLR);  Service: Endoscopy;  Laterality: N/A;    ESOPHAGOGASTRODUODENOSCOPY N/A 2/14/2020    Procedure: EGD (ESOPHAGOGASTRODUODENOSCOPY);  Surgeon: Kevin De La Paz MD;  Location: [facility] (2ND FLR);  Service: Endoscopy;  Laterality: N/A;    FOOT AMPUTATION THROUGH METATARSAL      left foot    LEG AMPUTATION THROUGH KNEE  12/18/2013    left BKA    R AVF  9/12/12    UPPER GASTROINTESTINAL ENDOSCOPY         Review of patient's allergies indicates:   Allergen Reactions    Fosrenol [lanthanum] Nausea And Vomiting     Nausea and vomiting     Current Facility-Administered Medications   Medication Frequency    0.9%  NaCl infusion (for blood administration) Q24H PRN    0.9%  NaCl infusion PRN    0.9%  NaCl infusion Once    acetaminophen tablet 650 mg Q4H PRN    albuterol inhaler 2 puff Q6H    dextromethorphan-guaifenesin  mg/5 ml liquid 10 mL Q4H PRN    dextrose 10% (D10W) Bolus PRN    dextrose 10% (D10W) Bolus PRN    glucagon (human recombinant) injection 1 mg PRN    glucose chewable tablet 16 g PRN    glucose chewable tablet 24 g PRN    levetiracetam oral soln Soln 250 mg BID    loperamide capsule 2 mg Q6H PRN    melatonin tablet 6 mg Nightly PRN    metoclopramide HCl tablet 10 mg QID    [START ON 4/24/2020] midodrine tablet 5 mg Every Mon, Wed, Fri    ondansetron injection 4 mg Q8H PRN    pantoprazole EC tablet 40 mg BID    piperacillin-tazobactam 4.5 g in sodium chloride 0.9% 100 mL IVPB (ready to mix system) Q12H    sevelamer carbonate tablet 800 mg TID WM    sodium chloride 0.9% flush 10 mL Q8H PRN    vancomycin - pharmacy to dose pharmacy to manage frequency     Family History     Problem Relation (Age of Onset)    Alcohol abuse Maternal Grandmother    Diabetes Brother, Maternal Grandfather    Early death Mother    Heart disease Father    Hyperlipidemia Father    Hypertension Father, Sister     Kidney disease Father        Tobacco Use    Smoking status: Former Smoker     Packs/day: 1.00     Years: 10.00     Pack years: 10.00    Smokeless tobacco: Never Used   Substance and Sexual Activity    Alcohol use: No    Drug use: No    Sexual activity: Yes     Partners: Female     Birth control/protection: None     Review of Systems   Reason unable to perform ROS: ROS & PE deferred to Primary Team d/t COVID-19 precautions; 4/22/20 H&P by Dr. Turcios reviewed.     Objective:     Vital Signs (Most Recent):  Temp: 97.8 °F (36.6 °C) (04/23/20 0530)  Pulse: (!) 126 (04/23/20 0100)  Resp: 20 (04/23/20 0100)  BP: 120/67 (04/23/20 0530)  SpO2: 100 % (04/23/20 0530)  O2 Device (Oxygen Therapy): nasal cannula (04/23/20 0334) Vital Signs (24h Range):  Temp:  [97.1 °F (36.2 °C)-99.2 °F (37.3 °C)] 97.8 °F (36.6 °C)  Pulse:  [112-130] 126  Resp:  [16-21] 20  SpO2:  [93 %-100 %] 100 %  BP: (120-163)/(67-91) 120/67     Weight: 44.9 kg (98 lb 15.8 oz) (04/22/20 2318)  Body mass index is 15.98 kg/m².  Body surface area is 1.45 meters squared.    No intake/output data recorded.    Physical Exam   Constitutional:   ROS & PE deferred to Primary Team d/t COVID-19 precautions; 4/22/20 H&P by Dr. Turcios reviewed.   Nursing note and vitals reviewed.      Significant Labs:  All labs within the past 24 hours have been reviewed.    Significant Imaging:  Labs: Reviewed  ECG: Reviewed  X-Ray: Reviewed    Assessment/Plan:     * COVID-19 virus infection  Managed by Primary Team    ESRD on dialysis  HD today  TTS while IP  Meds to renal parameters  CBC, renal function panel with labs    Hgb 7.2  -ferritin >21727 4/22/20  -will start epogen  -corrected Ca+ 10.88  Ph- (add-on)  Alb 1.9    On renal diet  -on Novasource renal with TF    COVID-19+ 4/22/20  -recently dialyzing at COVID-19+ HD unit ([facility] [place] will refer to Yvrose, Nephrology, LMSW    [facility]  Dr. Lemus  TTS  3:45  AVF  DW 52.5kg  Last HD T 4/21/20              Thank you for your  consult. I will follow-up with patient. Please contact us if you have any additional questions.    SEAN Figueroa  Nephrology  [facility]

## 2020-04-23 NOTE — CONSULTS
Wound consult received on patient's skin breakdown to sacrum. Stage 2 pressure injury noted over sacral spine, partial thickness skin loss noted. Moist red wound bed, periwound intact. Wound approximately 0.2cm L x 0.2cm W x 0.1cm D, appears to be healing well. Patient incontinent. Recommend BID/prn cleaning with bathing cloths and apply Triad barrier cream. Dr. Calzada approved recommendations.  EHOB waffle overlay ordered.  Nursing to continue care.   Re-consult wound care if needed.

## 2020-04-23 NOTE — PLAN OF CARE
Problem: Wound  Goal: Optimal Wound Healing  Outcome: Ongoing, Progressing     Problem: Fall Injury Risk  Goal: Absence of Fall and Fall-Related Injury  Outcome: Ongoing, Progressing     Problem: Adult Inpatient Plan of Care  Goal: Plan of Care Review  Outcome: Ongoing, Progressing  Goal: Patient-Specific Goal (Individualization)  Outcome: Ongoing, Progressing  Goal: Absence of Hospital-Acquired Illness or Injury  Outcome: Ongoing, Progressing  Goal: Optimal Comfort and Wellbeing  Outcome: Ongoing, Progressing  Goal: Readiness for Transition of Care  Outcome: Ongoing, Progressing  Goal: Rounds/Family Conference  Outcome: Ongoing, Progressing     Problem: Diabetes Comorbidity  Goal: Blood Glucose Level Within Desired Range  Outcome: Ongoing, Progressing     Problem: Skin Injury Risk Increased  Goal: Skin Health and Integrity  Outcome: Ongoing, Progressing     Problem: Device-Related Complication Risk (Hemodialysis)  Goal: Safe, Effective Therapy Delivery  Outcome: Ongoing, Progressing     Problem: Hemodynamic Instability (Hemodialysis)  Goal: Vital Signs Remain in Desired Range  Outcome: Ongoing, Progressing     Problem: Infection (Hemodialysis)  Goal: Absence of Infection Signs/Symptoms  Outcome: Ongoing, Progressing     AAOx1. VS stable. No PRN medications administered. Large bowel movement x1. Bed low, wheels locked, side rails up x2, call light within reach. No falls or injuries. Will continue to monitor pt.

## 2020-04-23 NOTE — ASSESSMENT & PLAN NOTE
Mr Retana is a 54 year old man with history of ESRD, L BKA 2/2 osteomyelitis, HFpEF, multiple ICH without deficits, gastroparesis, numerous hospitalizations due to esophagitis, who is presenting to the hospital on 4/22 due to dyspena, COVID. GI consulted due to concern for GI bleeding.    Multiple prior hospitalizations and Gi evaluations for coffee-grind emesis due to esophagitis. Most recent EGD (2/2020) only notable for erosive gastropathy. Prior negative colonoscopy 4/2017. Now admitted with dyspnea and covid, hemodynamically stable, no overt signs of bleeding, mild decline in H&H to 7.2. Recommend supportive care at this time and monitoring for bleeding.    Plan  - ok to resume tube feeds  - protonix 40mg BID  - monitor for signs of bleeding, no plan for repeat endoscopic evaluation at this time  - trend H&H, maintain hgb > 7

## 2020-04-23 NOTE — SUBJECTIVE & OBJECTIVE
Past Medical History:   Diagnosis Date    Amputation stump pain 9/10/2013    Aspiration pneumonia 7/27/2015    Asterixis 11/8/2016    C. difficile colitis 8/7/2015    Cholelithiasis without obstruction 8/25/2015    Chronic diastolic heart failure     2-23-17   1 - Low normal to mildly depressed left ventricular systolic function (EF 50-55%).    2 - Right ventricular enlargement with mildly depressed systolic function.    3 - Left ventricular diastolic dysfunction.    4 - Right atrial enlargement.    5 - Severe tricuspid regurgitation.    6 - Pulmonary hypertension. The estimated PA systolic pressure is 86 mmHg.    7 - Increased central venous pressure.    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    Metabolic acidosis, IAG, reduced excretion of inorganic acids     Myoclonic jerking 9/20/2016    Noncompliance with medication regimen 12/4/2018    Secondary hyperparathyroidism (of renal origin)     Secondary pulmonary hypertension 3/23/2017    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    TB lung, latent 08/25/2015    Negative Quantiferon Gold 3-23-17       Past Surgical History:   Procedure Laterality Date    COLONOSCOPY      COLONOSCOPY N/A 4/4/2017    Procedure: COLONOSCOPY;  Surgeon: Walker Stern MD;  Location: [facility] ([address] [place] [address]);  Service: Endoscopy;  Laterality: N/A;  PA Systolic Pressure 85.56. HD Patient MWF, K+ lab  prior to procedure.     ESOPHAGOGASTRODUODENOSCOPY N/A 6/12/2018    Procedure: EGD (ESOPHAGOGASTRODUODENOSCOPY);  Surgeon: Man Galicia MD;  Location: [facility] (2ND FLR);  Service: Endoscopy;  Laterality: N/A;  EGD in 8-12 weeks with Dr. Galicia on 4th floor for follow up erosive esophagitis and Mejia's surveillance.    Patient should be on Pantoprazole 40mg every 12 hours or the equivulant of another PPI    awaiting for patient to reply back regarding changing    ESOPHAGOGASTRODUODENOSCOPY N/A 3/7/2019    Procedure: EGD (ESOPHAGOGASTRODUODENOSCOPY);  Surgeon: Man Galicia MD;  Location: [facility] (2ND FLR);  Service: Endoscopy;  Laterality: N/A;    ESOPHAGOGASTRODUODENOSCOPY N/A 9/23/2019    Procedure: EGD (ESOPHAGOGASTRODUODENOSCOPY);  Surgeon: Keanu Rainey MD;  [facility]  Service: Endoscopy;  Laterality: N/A;    ESOPHAGOGASTRODUODENOSCOPY N/A 10/2/2019    Procedure: EGD (ESOPHAGOGASTRODUODENOSCOPY);  Surgeon: Kevin De La Paz MD;  [facility]  Service: Endoscopy;  Laterality: N/A;    ESOPHAGOGASTRODUODENOSCOPY N/A 11/12/2019    Procedure: EGD (ESOPHAGOGASTRODUODENOSCOPY);  Surgeon: Kevin De La Paz MD;  [facility]  Service: Endoscopy;  Laterality: N/A;    ESOPHAGOGASTRODUODENOSCOPY N/A 2/14/2020    Procedure: EGD (ESOPHAGOGASTRODUODENOSCOPY);  Surgeon: Kevin De La Paz MD;  Location: [facility] ([facility] F[facility]);  Service: Endoscopy;  Laterality: N/A;    FOOT AMPUTATION THROUGH METATARSAL      left foot    LEG AMPUTATION THROUGH KNEE  12/18/2013    left BKA    R AVF  9/12/12    UPPER GASTROINTESTINAL ENDOSCOPY         Review of patient's allergies indicates:   Allergen Reactions    Fosrenol [lanthanum] Nausea And Vomiting     Nausea and vomiting     Current Facility-Administered Medications   Medication Frequency    0.9%  NaCl infusion (for blood administration) Q24H PRN    0.9%  NaCl infusion PRN    0.9%  NaCl infusion Once     acetaminophen tablet 650 mg Q4H PRN    albuterol inhaler 2 puff Q6H    dextromethorphan-guaifenesin  mg/5 ml liquid 10 mL Q4H PRN    dextrose 10% (D10W) Bolus PRN    dextrose 10% (D10W) Bolus PRN    glucagon (human recombinant) injection 1 mg PRN    glucose chewable tablet 16 g PRN    glucose chewable tablet 24 g PRN    levetiracetam oral soln Soln 250 mg BID    loperamide capsule 2 mg Q6H PRN    melatonin tablet 6 mg Nightly PRN    metoclopramide HCl tablet 10 mg QID    [START ON 4/24/2020] midodrine tablet 5 mg Every Mon, Wed, Fri    ondansetron injection 4 mg Q8H PRN    pantoprazole EC tablet 40 mg BID    piperacillin-tazobactam 4.5 g in sodium chloride 0.9% 100 mL IVPB (ready to mix system) Q12H    sevelamer carbonate tablet 800 mg TID WM    sodium chloride 0.9% flush 10 mL Q8H PRN    vancomycin - pharmacy to dose pharmacy to manage frequency     Family History     Problem Relation (Age of Onset)    Alcohol abuse Maternal Grandmother    Diabetes Brother, Maternal Grandfather    Early death Mother    Heart disease Father    Hyperlipidemia Father    Hypertension Father, Sister    Kidney disease Father        Tobacco Use    Smoking status: Former Smoker     Packs/day: 1.00     Years: 10.00     Pack years: 10.00    Smokeless tobacco: Never Used   Substance and Sexual Activity    Alcohol use: No    Drug use: No    Sexual activity: Yes     Partners: Female     Birth control/protection: None     Review of Systems   Reason unable to perform ROS: ROS & PE deferred to Primary Team d/t COVID-19 precautions; 4/22/20 H&P by Dr. Turcios reviewed.     Objective:     Vital Signs (Most Recent):  Temp: 97.8 °F (36.6 °C) (04/23/20 0530)  Pulse: (!) 126 (04/23/20 0100)  Resp: 20 (04/23/20 0100)  BP: 120/67 (04/23/20 0530)  SpO2: 100 % (04/23/20 0530)  O2 Device (Oxygen Therapy): nasal cannula (04/23/20 0334) Vital Signs (24h Range):  Temp:  [97.1 °F (36.2 °C)-99.2 °F (37.3 °C)] 97.8 °F (36.6 °C)  Pulse:   [112-130] 126  Resp:  [16-21] 20  SpO2:  [93 %-100 %] 100 %  BP: (120-163)/(67-91) 120/67     Weight: 44.9 kg (98 lb 15.8 oz) (04/22/20 2318)  Body mass index is 15.98 kg/m².  Body surface area is 1.45 meters squared.    No intake/output data recorded.    Physical Exam   Constitutional:   ROS & PE deferred to Primary Team d/t COVID-19 precautions; 4/22/20 H&P by Dr. Turcios reviewed.   Nursing note and vitals reviewed.      Significant Labs:  All labs within the past 24 hours have been reviewed.    Significant Imaging:  Labs: Reviewed  ECG: Reviewed  X-Ray: Reviewed

## 2020-04-23 NOTE — ASSESSMENT & PLAN NOTE
HD today  TTS while IP  Meds to renal parameters  CBC, renal function panel with labs    Hgb 7.2  -ferritin >02040 4/22/20  -will start epogen  -corrected Ca+ 10.88  Ph- (add-on)  Alb 1.9    On renal diet  -on Novasource renal with TF    [facility] [place]  Dr. Lemus  TTS  3:45  AVF  DW 52.5kg  Last HD T 4/21/20

## 2020-04-23 NOTE — H&P
Hospital Medicine  History and Physical  [facility]      Patient Name: Vaughn Retana  MRN:  [id number]  Hospital Medicine Team: [facility] HOSP MED K Susan Turcios MD  Date of Admission:  4/22/2020     Length of Stay:  LOS: 0 days     Principal Problem: Suspected Covid-19 Virus Infection       History of Present Illness:    Mr. Vaughn Retana is a 54 y.o. male with hypertension, intracranial bleed, end-stage renal on dialysis MWF,L BKA 2/2 osteomyelitis, history of DVT, history of heart failure including pulmonary hypertension with normal EF (last 2017), hx upper GI bleeds, PEG status, seizures, latent TB presented from [facility] for evaluation of shortness of breath.  Patient isn't able to speak, so history is obtained via yes/no questions and ER documentation.  He denies any SOB, cough, fever, or chills and says he feels ok with no pain.  Of note, he just had an extensive hospitalization at [facility] from 3/28-4/15 for COVID.  At the end of his hospital course, he was transfused blood and decided to maintain a conservative approach so GI wasn't involved.  Additionally, he was DNR during the last hospital stay.    Upon arrival to the ER, vitals were temp 98.9F with HR 110s and satting 100% on 2L NC.  CXR showed improved consolidations from his recent hospitalization.  He was given Vanc and Zosyn and was admitted to Hospital Medicine for further management.        Review of Systems:  Unable to obtain 2/2 aphasia      Past Medical History: Patient has a past medical history of Amputation stump pain (9/10/2013), Aspiration pneumonia (7/27/2015), Asterixis (11/8/2016), C. difficile colitis (8/7/2015), Cholelithiasis without obstruction (8/25/2015), Chronic diastolic heart failure, Chronic low back pain (12/1/2015), Closed head injury (9/8/2016), DVT (deep venous thrombosis) (7/28/2017), ESRD on hemodialysis (2/7/2013), GERD (gastroesophageal reflux disease), HCV antibody positive,  Hemiparesis affecting left side as late effect of stroke (11/08/2016), History of Intracerebral Hemorrhage: L BG 5/2013; R BG 9/2016; R BG 11/2016; L caudate head 2/2017 (11/2/2016), Hypertension, left basal ganglia ICH 5/2013 (11/2/2016), Left Caudate Head ICH 2/22/2017 (2/24/2017), 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      Past Surgical History: Patient has a past surgical history that includes R AVF (9/12/12); Leg amputation through knee (12/18/2013); Foot amputation through metatarsal; Colonoscopy; Colonoscopy (N/A, 4/4/2017); Esophagogastroduodenoscopy (N/A, 6/12/2018); Upper gastrointestinal endoscopy; Esophagogastroduodenoscopy (N/A, 3/7/2019); Esophagogastroduodenoscopy (N/A, 9/23/2019); Esophagogastroduodenoscopy (N/A, 10/2/2019); Esophagogastroduodenoscopy (N/A, 11/12/2019); and Esophagogastroduodenoscopy (N/A, 2/14/2020).      Social History: Patient reports that he has quit smoking. He has a 10.00 pack-year smoking history. He has never used smokeless tobacco. He reports that he does not drink alcohol or use drugs.      Family History: Patient's family history includes Alcohol abuse in his maternal grandmother; Diabetes in his brother and maternal grandfather; Early death in his mother; Heart disease in his father; Hyperlipidemia in his father; Hypertension in his father and sister; Kidney disease in his father.      Medications: Scheduled Meds:   [START ON 4/23/2020] albuterol  2 puff Inhalation Q6H    heparin (porcine)  7,500 Units Subcutaneous Q12H    levETIRAcetam  250 mg Oral BID    [START ON 4/23/2020] metoclopramide HCl  10 mg Oral QID     [START ON 4/24/2020] midodrine  5 mg Oral Every Mon, Wed, Fri    pantoprazole  40 mg Oral BID    [START ON 4/23/2020] piperacillin-tazobactam (ZOSYN) IVPB  4.5 g Intravenous Q12H    [START ON 4/23/2020] sevelamer carbonate  800 mg Oral TID WM    vancomycin (VANCOCIN) IVPB  20 mg/kg Intravenous ED 1 Time     Continuous Infusions:  PRN Meds:.sodium chloride, acetaminophen, dextromethorphan-guaifenesin  mg/5 ml, Dextrose 10% Bolus, Dextrose 10% Bolus, glucagon (human recombinant), glucose, glucose, loperamide, melatonin, ondansetron, sodium chloride 0.9%, Pharmacy to dose Vancomycin consult **AND** vancomycin - pharmacy to dose      Allergies: Patient is allergic to fosrenol [lanthanum].      Physical Exam:    Temp:  [98.9 °F (37.2 °C)]   Pulse:  [112-118]   Resp:  [16]   BP: (122-133)/(78)   SpO2:  [100 %]     Constitutional: Appears sickly  Head: Normocephalic and atraumatic.   Eyes: EOM are normal. Pupils are equal, round, and reactive to light. No scleral icterus.   Neck: Normal range of motion. Neck supple.   Cardiovascular: Normal heart rate.  Regular heart rhythm.  No murmur heard.  Pulmonary/Chest: Comfortable on 2L NC.  Coarse breath sounds throughout  Abdominal: Soft. Bowel sounds are normal.  No distension.  No tenderness  Musculoskeletal: Left BKA.  Right leg contractures  Neurological: Alert and making eye contact.  Not answering questions  Skin: Skin is warm and dry.   Psychiatric: Normal mood and affect. Behavior is normal.       Laboratory:  Recent Labs   Lab 04/22/20 1849   WBC 15.60*   LYMPH 7.9*  1.2   HGB 7.2*   HCT 23.5*        Recent Labs   Lab 04/22/20 2016      K 4.3   CL 99   CO2 23   BUN 50*   CREATININE 6.2*   *   CALCIUM 9.2     Recent Labs   Lab 04/22/20 2016   ALKPHOS 79   ALT 14   AST 49*   ALBUMIN 1.9*   PROT 8.3   BILITOT 0.5      Recent Labs     04/22/20 1849 04/22/20 2016   CRP  --  270.0*   LDH  --  226   TROPONINI 0.439*  --    LACTATE 1.6  --         All labs within the last 24 hours were reviewed.     Microbiology:  Lab Results   Component Value Date    [id number] Positive (A) 04/22/2020       Microbiology Results (last 7 days)     Procedure Component Value Units Date/Time    Culture, Respiratory with Gram Stain [[id number]]     Order Status:  No result Specimen:  Sputum, Expectorated     Blood culture x two cultures. Draw prior to antibiotics. [[id number]] Collected:  04/22/20 1919    Order Status:  Sent Specimen:  Blood from Peripheral, Forearm, Left Updated:  04/22/20 1924    Blood culture x two cultures. Draw prior to antibiotics. [[id number]] Collected:  04/22/20 1849    Order Status:  Sent Specimen:  Blood from Peripheral, Antecubital, Left Updated:  04/22/20 1910            Imaging      No results found for this or any previous visit.    X-Ray Chest AP Portable  Narrative: EXAMINATION:  XR CHEST AP PORTABLE    CLINICAL HISTORY:  dyspnea;    TECHNIQUE:  Single frontal view of the chest was performed.    COMPARISON:  Chest radiograph 03/28/2020    FINDINGS:  Monitoring leads and tubing overlie the chest.  Patient is rotated.  Cardiomediastinal silhouette is midline and similar to prior.    Interval improved aeration of both lungs with residual mild patchy opacities at the left greater than right lung bases from 03/28/2020 exam.  No definite new focal opacity, pleural effusion or pneumothorax.    Osseous structures appear stable without acute process seen.  Right subclavian vascular stent unchanged.  PA and lateral views can be obtained.  Impression: As above.    Electronically signed by: Vishal Laws MD  Date:    04/22/2020  Time:    19:07      All imaging within the last 24 hours was reviewed.       Assessment and Plan:    Active Hospital Problems    Diagnosis  POA    *COVID-19 virus infection [U07.1]  Yes    Leukocytosis [D72.829]  Yes    ESRD on dialysis [N18.6, Z99.2]  Not Applicable    Pressure injury due to medical device [T85.898A, L89.90]   Yes    Seizure [R56.9]  Yes    Hemodialysis-associated hypotension [I95.3]  Yes    Severe malnutrition [E43]  Yes     Assessment and Plan  Severe Malnutrition in the context of Social/Environmental Circumstances     Related to (etiology):  Unknown etiology     Signs and Symptoms (as evidenced by):  Energy Intake: per pt, <75% intake over the last year  Body Fat Depletion: overall subcutaneous fat loss, moderate triceps fat loss and protruding patellas.  Muscle Mass Depletion:  moderate muscle wasting of interosseous, temporal, clavicle, calves and quadriceps  Weight Loss: 24% (39 lbs) x 1 year    Recommendations     Recommendation/Intervention: 1. Should pt be cleared for po diet, recommend renal diet with texture per SLP along with Novasource ONS TID. 2. Should pt require enteral feeding, initiate Novasource renal formula at 10ml/hr and advance 10ml Q4hrs to goal rate of 40ml/hr (provides 1920kcal, 87g pro, 691ml free water). Provide additional 500ml water flush/24 hrs. Hold for residuals >500ml.  Goals: 1. Pt to receive nutrition < 48 hrs.      Erosive gastritis [K29.60]  Yes    Chronic kidney disease-mineral and bone disorder [N18.9, E83.9, M89.9]  Yes     Chronic    Chronic diastolic heart failure [I50.32]  Yes     Chronic     2-23-17    1 - Low normal to mildly depressed left ventricular systolic function (EF 50-55%).     2 - Right ventricular enlargement with mildly depressed systolic function.     3 - Left ventricular diastolic dysfunction.     4 - Right atrial enlargement.     5 - Severe tricuspid regurgitation.     6 - Pulmonary hypertension. The estimated PA systolic pressure is 86 mmHg.     7 - Increased central venous pressure.       History of Intracerebral Hemorrhage: L BG 5/2013; R BG 9/2016; R BG 11/2016; L caudate head 2/2017 [Z86.79]  Not Applicable     Chronic    Acute respiratory failure with hypoxia [J96.01]  Yes    Anemia in chronic kidney disease [N18.9, D63.1]  Yes     Chronic       Resolved Hospital Problems   No resolved problems to display.       Covid-19 Virus Infection  - COVID-19 testing: Positive on 3/25 and again on 4/22  - Isolation: Airborne/Droplet. Surgical mask on patient. Notify Infection Control  - Diagnostics: Trend Q48hrs if stable, more frequently if patient decompensating.  Lymphopenia, hyponatremia, hyperferritinemia, elevated troponin, elevated d-dimer, age, and comorbidities are significant predictors of poor clinical outcome)  o CBC:  WBC 15  o CMP:  ESRD on HD  o Ferritin:    o D-dimer:    o CRP:  270  o BNP:    o CPK:  957  o LDH:  226  o Troponin:  0.439  o Procalcitonin:  5.39  o CXR:  Improving consolidations  o Blood culture x2 4/22/2020 NGTD  o Sputum culture 4/22/2020 NGTD    - Management: Per [facility] COVID Treatment Protocol (4/15/20)    - Monitoring:   - Telemetry & Continuous Pulse Oximetry    - Nutrition:    - Multivitamin PO daily   - Add Boost supplement   - Vitamin D 1000IU daily if deficient   - Ascorbic acid 500mg PO bid    - Supportive Care:   - acetaminophen 650mg PO Q6hr PRN fever/headache   - loperamide PRN viral diarrhea   - IVF if indicated, restrictive strategy preferred, no maintenance IV if able   - VTE PPx: enoxaparin or heparin SQ unless contraindicated    - Antibiotics:  - if indications, CXR findings, elevated procal. See protocol for alternatives.   - Discontinue early if low concern for bacterial co-infection   - Vanc/Zosyn given COVID and recent hospitalization    Acute Hypoxemic Respiratory Failure  Acute Respiratory Disease 2/2 COVID19  - Order RT consult via Respiratory Communication for COVID Protocols  - Order Incentive Spirometer Q4h  - Order Flutter Valve Q4h  - Continuous Pulse Oximetry  - Goal SpO2 92-96%  - Supplemental O2 via LFNC, VentiMask, or HFNC (see Respiratory Support Oxygen Therapies)  - If wheezing, albuterol INH Q6h scheduled & PRN  - Proning Protocol if patient is a candidate (see  Proning Protocol)   - GCS >13,  able to self-prone  - If deterioration, may warrant trial of NIPPV and transfer to [facility] pressure room or immediate ICU consult  - CXR with multifocal opacities  - Satting 100% on 2L NC  - Continue Vanc/Zosyn given recent hospitalization    Chronic Upper GIB  Erosive Gastritis  · Reports of recurrent and chronic GIB  · Last EGD 2/14/20 with non-bleeding erosive gastropathy  · Continue PPI BID  · GI consult given severe drop in anemia    Chronic Blood Loss Anemia  Anemia in CKD  · Hemoglobin 7.2 on admit with tachycardia  · Was 9 2 weeks ago at [place]  · Type and screen ordered  · Transfuse 1 unit PRBCs    History of ICH  · 2013  · Resides at [facility]    Chronic Diastolic Heart Failure  · Chronic and stable  · Not on BP medications    HD Associated Hypotension  · Chronic issue  · Continue Midodrine with HD MWF    CKD Mineral and Bone Disorder  ESRD on HD  · Chronic and stable  · Continue Renvela TID    Seizure Disorder  · Chronic and stable  · Continue Keppra 250mg BID  · Seizure precautions    Sacral Pressure Injury  · Wound Care consult    Patient's chronic/stable medical conditions noted in the assessment above will be managed with the patient's home medications as tolerated.      Diet:  Renal pleasure feeds, TF  VTE PPx:  Heparin BID  Goals of Care:  Full code based on paperwork from NH.  Was DNR on previous admission.      Susan Turcios MD  [facility]  Cell:  [phone]  Spectra:  [phone]

## 2020-04-24 PROBLEM — Z51.5 PALLIATIVE CARE ENCOUNTER: Status: ACTIVE | Noted: 2020-01-01

## 2020-04-24 NOTE — PLAN OF CARE
Problem: Malnutrition  Goal: Improved Nutritional Intake  Outcome: Ongoing, Progressing  Intervention: Promote and Optimize Oral Intake  Flowsheets (Taken 4/24/2020 0816)  Oral Nutrition Promotion: calorie dense foods provided; other (see comments)  Intervention: Promote and Optimize Nutrition Support  Flowsheets (Taken 4/24/2020 0816)  Nutrition Support Management: tube feeding held; weight trending reviewed; other (see comments); tube feeding rate decreased     Recommendations     Pt meets ASPEN criteria for chronic, severe protein-calorie malnutrition.      1.) Suggest SLP swallow evaluation for diet texture. ADAT to renal, high protein diet.   2.) If enteral nutrition to begin: suggest Novasource Renal @ 35mL/hr to provide 1680 kcals, 76gm protein and 602mL of free water.               MD to manage fluid.               If GRV >500mL, hold TF q4h then restart regimen.               TF to meet 107% EEN and 113% EPN.   3.) If bolus preferred, bolus 210mL q6h to meet nutritional needs.   4.) Suggest ascorbic acid, vitamin D, MVI, and zinc due to COVID19.   5.) Daily weights     Goals: 1.) Pt to consume/tolerate >75% EEN and EPN by follow up.   Nutrition Goal Status: new  Communication of LULU Recs: other (comment)(POC)

## 2020-04-24 NOTE — SUBJECTIVE & OBJECTIVE
Interval History: Nephrology consulted for ESRD Management.    Review of patient's allergies indicates:   Allergen Reactions    Fosrenol [lanthanum] Nausea And Vomiting     Nausea and vomiting     Current Facility-Administered Medications   Medication Frequency    0.9%  NaCl infusion (for blood administration) Q24H PRN    0.9%  NaCl infusion PRN    0.9%  NaCl infusion Once    [START ON 4/25/2020] 0.9%  NaCl infusion PRN    [START ON 4/25/2020] 0.9%  NaCl infusion Once    acetaminophen tablet 650 mg Q4H PRN    albuterol inhaler 2 puff Q6H    dextromethorphan-guaifenesin  mg/5 ml liquid 10 mL Q4H PRN    dextrose 10% (D10W) Bolus PRN    dextrose 10% (D10W) Bolus PRN    glucagon (human recombinant) injection 1 mg PRN    glucose chewable tablet 16 g PRN    glucose chewable tablet 24 g PRN    heparin 25,000 units in dextrose 5% 250 mL (100 units/mL) infusion LOW INTENSITY nomogram - [facility] Continuous    levetiracetam oral soln Soln 250 mg BID    loperamide capsule 2 mg Q6H PRN    melatonin tablet 6 mg Nightly PRN    metoclopramide HCl tablet 10 mg QID    midodrine tablet 5 mg PRN    ondansetron injection 4 mg Q8H PRN    pantoprazole EC tablet 40 mg BID    piperacillin-tazobactam 4.5 g in sodium chloride 0.9% 100 mL IVPB (ready to mix system) Q12H    sevelamer carbonate tablet 800 mg TID WM    sodium chloride 0.9% flush 10 mL Q8H PRN    vancomycin - pharmacy to dose pharmacy to manage frequency    vancomycin 1.25 g in dextrose 5% 250 mL IVPB (ready to mix) Once       Objective:     Vital Signs (Most Recent):  Temp: 100.2 °F (37.9 °C) (04/24/20 1300)  Pulse: (!) 126 (04/24/20 1545)  Resp: (!) 24 (04/24/20 1545)  BP: 107/79 (04/24/20 1545)  SpO2: 99 % (04/24/20 1545)  O2 Device (Oxygen Therapy): nasal cannula (04/24/20 1342) Vital Signs (24h Range):  Temp:  [97.7 °F (36.5 °C)-101 °F (38.3 °C)] 100.2 °F (37.9 °C)  Pulse:  [110-136] 126  Resp:  [19-30] 24  SpO2:  [92 %-99 %] 99 %  BP:  ()/(45-83) 107/79     Weight: 44.9 kg (98 lb 15.8 oz) (04/24/20 0700)  Body mass index is 15.98 kg/m².  Body surface area is 1.45 meters squared.    I/O last 3 completed shifts:  In: 1818 [P.O.:918; Other:800; IV Piggyback:100]  Out: 2500 [Other:2500]    Physical Exam   Constitutional:   PE deferred to Primary Team d/t COVID-19 precautions; reviewed 4/24/20 Progress Note by Dr. Calzada   Nursing note and vitals reviewed.      Significant Labs:  All labs within the past 24 hours have been reviewed.     Significant Imaging:  Labs: Reviewed  US: Reviewed

## 2020-04-24 NOTE — CARE UPDATE
Rapid Response Nurse Chart Check     Chart check completed, abnormal VS noted. Temp 101.0 at 2058. Tylenol given per primary RN. Please call [phone] for further concerns or assistance.

## 2020-04-24 NOTE — TREATMENT PLAN
GI Treatment Plan    Vaughn Retana is a 54 y.o. male admitted to hospital 4/22/2020 (Hospital Day: 3) due to COVID-19 virus infection.     Interval History  - greater than expected response to 1u pRBC (7.2 -> 8.9)  - remains HDS  - no reports of evidence of GI bleeding  - exam deferred due to COVID    Laboratory    Recent Labs   Lab 04/22/20  1849 04/23/20  1151   HGB 7.2* 8.9*     Plan  - continue supportive care, no plan for endoscopy  - PPI 40mg BID  - call if any changes in clinical status or signs of bleeding  - Plan of care was discussed with primary team.    Thank you for involving us in the care of Vaughn Retana. Please call with any additional questions, concerns or changes in the patient's clinical status.    Yimi Jacques MD  Gastroenterology Fellow  Spectralink: [phone]

## 2020-04-24 NOTE — PROGRESS NOTES
Pharmacokinetic Assessment Follow Up: IV Vancomycin    Patient with ESRD on iHD (TThS)   - Vancomycin 1,2500 mg x 1 dose was not given last night since patient did not get dialysis until overnight and finished around 04:30 am.   - Re-ordered 1,250 mg vanc x 1 this morning.   - Desired empiric serum trough concentration is 10 to 20 mcg/mL.  - Draw vancomycin random level on 04/25/2020 at with AM labs prior to HD.      Drug levels (last 3 results):  Recent Labs   Lab Result Units 04/23/20  1145   Vancomycin, Random ug/mL 7.8       Pharmacy will continue to follow and monitor vancomycin.    Please contact pharmacy at extension [phone] for questions regarding this assessment.    Thank you for the consult,   Dahiana Ramos       Patient brief summary:  Vaughn Retana is a 54 y.o. male initiated on antimicrobial therapy with IV Vancomycin for treatment of suspected pneumonia.       Drug Allergies:   Review of patient's allergies indicates:   Allergen Reactions    Fosrenol [lanthanum] Nausea And Vomiting     Nausea and vomiting       Actual Body Weight:   45 kg    Renal Function:   Estimated Creatinine Clearance: 8.5 mL/min (A) (based on SCr of 6.2 mg/dL (H)).,     Dialysis Method (if applicable):  iHD    CBC (last 72 hours):  Recent Labs   Lab Result Units 04/22/20  1849 04/23/20  1151   WBC K/uL 15.60* 15.73*   Hemoglobin g/dL 7.2* 8.9*   Hematocrit % 23.5* 27.4*   Platelets K/uL 239 201   Gran% % 83.4* 85.3*   Lymph% % 7.9* 6.9*   Mono% % 8.0 6.8   Eosinophil% % 0.1 0.3   Basophil% % 0.2 0.1   Differential Method  Automated Automated       Metabolic Panel (last 72 hours):  Recent Labs   Lab Result Units 04/22/20 2016   Sodium mmol/L 136   Potassium mmol/L 4.3   Chloride mmol/L 99   CO2 mmol/L 23   Glucose mg/dL 115*   BUN, Bld mg/dL 50*   Creatinine mg/dL 6.2*   Albumin g/dL 1.9*   Total Bilirubin mg/dL 0.5   Alkaline Phosphatase U/L 79   AST U/L 49*   ALT U/L 14       Vancomycin Administrations:  vancomycin given in  the last 96 hours      No antibiotic orders with administrations found.                Microbiologic Results:  Microbiology Results (last 7 days)     Procedure Component Value Units Date/Time    Blood culture [[id number]]     Order Status:  Sent Specimen:  Blood     Blood culture [[id number]]     Order Status:  Sent Specimen:  Blood     Blood culture x two cultures. Draw prior to antibiotics. [[id number]] Collected:  04/22/20 1919    Order Status:  Completed Specimen:  Blood from Peripheral, Forearm, Left Updated:  04/24/20 0100     Blood Culture, Routine Gram stain miya bottle: Gram positive cocci in clusters resembling Staph       Results called to and read back by:Marco Lucero RN 04/24/2020  01:00    Narrative:       Aerobic and anaerobic    Blood culture x two cultures. Draw prior to antibiotics. [[id number]] Collected:  04/22/20 1849    Order Status:  Completed Specimen:  Blood from Peripheral, Antecubital, Left Updated:  04/23/20 2212     Blood Culture, Routine No Growth to date      No Growth to date    Narrative:       Aerobic and anaerobic    Culture, Respiratory with Gram Stain [[id number]]     Order Status:  No result Specimen:  Sputum, Expectorated

## 2020-04-24 NOTE — ASSESSMENT & PLAN NOTE
Palliative Medicine Covid 19 Goals of Care Consult     Consult received by:   Halley WALDRON for goals of care and advanced care planning     Impression:  Mr. Retana is a 55 yo gentleman with PMH of hypertension, intracranial bleed, end-stage renal on dialysis MWF,L BKA 2/2 osteomyelitis, history of DVT, CHF, pulmonary hypertension, upper GI bleeds, PEG placement,  Seizures and  latent TB.  He presented to [facility] from  from [facility] for evaluation of shortness of breath. Mr. Retana is not able to hold  Conversation.  Able to jester and provide answers to yes or no questions. Extensive hospitalization at [facility] from 3/28-4/15 for COVID and GI bleed. Received blood transfusion and conservative medical management.  GI not consulted.       Current oxygen requirement is 2 liters nasal canula with sats 96 -100%       Advance Care Planning   Advance Care Planning /Goals of care     Living Will: no  LaPOST: no  Do Not Resuscitate Status: no  Medical Power of : no  Next of kin for medical decision making no    Decision-Making Capacity: patient is unable to answer questions   Next of kin for medical decisions:     Goals of Care:   - Unable to have conversation with Mr. Retana.  Spoke with his sister Vaishnavi Retana.  - Provided Ms. Retana with clinical updates and discussed current plan of care. -  - Discussed concern that his nutritional status is poor which can influence his chances of continued recovery from COVID infection.  Has PEG and tube feedings.  Speech following.  - Family amenable to dietary changes as necessary.  - Ms. Retana states patient was doing well and feels the nursing home may have been to quick to send back to hospital.  She reports Mr. Retana often has weakness and shortness of breath after his HD  - Family is not amenable to DNR.  She agreed to this last admit because he was so sick.    - Explained with COVID 19 patient status may decline rapidly.  Ms. Retana  is amenable to further discussion if he declines      Following goals established  - Continue current plan of care  - Family amenable to speech pathology and additional swallow study   - Family would like to receive regular updates and discuss need for escalation of care  - Not amenable to DNR        Recommendations:   - Continue current plan of care  - Discharge back to [facility] when discharge criteria met.

## 2020-04-24 NOTE — PROGRESS NOTES
[facility]  Nephrology  Progress Note    Patient Name: Vaughn Retana  MRN: [id number]  Admission Date: 4/22/2020  Hospital Length of Stay: 2 days  Attending Provider: Vania Calzada MD   Primary Care Physician: Cori Randall MD  Principal Problem:COVID-19 virus infection    Subjective:     HPI: 53 y/o male with PMHx ESRD on iHD (TTS, last HD T 4/21/20), hospitalization for COVID-19 (3/28/20-4/15/20), HF, GIB requiring blood transfusion, PEG status, seizures, and latent TB presented from NH for evaluation of shortness of breath; admitted with COVID-19 infection    Interval History: Nephrology consulted for ESRD Management.    Review of patient's allergies indicates:   Allergen Reactions    Fosrenol [lanthanum] Nausea And Vomiting     Nausea and vomiting     Current Facility-Administered Medications   Medication Frequency    0.9%  NaCl infusion (for blood administration) Q24H PRN    0.9%  NaCl infusion PRN    0.9%  NaCl infusion Once    [START ON 4/25/2020] 0.9%  NaCl infusion PRN    [START ON 4/25/2020] 0.9%  NaCl infusion Once    acetaminophen tablet 650 mg Q4H PRN    albuterol inhaler 2 puff Q6H    dextromethorphan-guaifenesin  mg/5 ml liquid 10 mL Q4H PRN    dextrose 10% (D10W) Bolus PRN    dextrose 10% (D10W) Bolus PRN    glucagon (human recombinant) injection 1 mg PRN    glucose chewable tablet 16 g PRN    glucose chewable tablet 24 g PRN    heparin 25,000 units in dextrose 5% 250 mL (100 units/mL) infusion LOW INTENSITY nomogram - [facility] Continuous    levetiracetam oral soln Soln 250 mg BID    loperamide capsule 2 mg Q6H PRN    melatonin tablet 6 mg Nightly PRN    metoclopramide HCl tablet 10 mg QID    midodrine tablet 5 mg PRN    ondansetron injection 4 mg Q8H PRN    pantoprazole EC tablet 40 mg BID    piperacillin-tazobactam 4.5 g in sodium chloride 0.9% 100 mL IVPB (ready to mix system) Q12H    sevelamer carbonate tablet 800 mg TID WM    sodium chloride  0.9% flush 10 mL Q8H PRN    vancomycin - pharmacy to dose pharmacy to manage frequency    vancomycin 1.25 g in dextrose 5% 250 mL IVPB (ready to mix) Once       Objective:     Vital Signs (Most Recent):  Temp: 100.2 °F (37.9 °C) (04/24/20 1300)  Pulse: (!) 126 (04/24/20 1545)  Resp: (!) 24 (04/24/20 1545)  BP: 107/79 (04/24/20 1545)  SpO2: 99 % (04/24/20 1545)  O2 Device (Oxygen Therapy): nasal cannula (04/24/20 1342) Vital Signs (24h Range):  Temp:  [97.7 °F (36.5 °C)-101 °F (38.3 °C)] 100.2 °F (37.9 °C)  Pulse:  [110-136] 126  Resp:  [19-30] 24  SpO2:  [92 %-99 %] 99 %  BP: ()/(45-83) 107/79     Weight: 44.9 kg (98 lb 15.8 oz) (04/24/20 0700)  Body mass index is 15.98 kg/m².  Body surface area is 1.45 meters squared.    I/O last 3 completed shifts:  In: 1818 [P.O.:918; Other:800; IV Piggyback:100]  Out: 2500 [Other:2500]    Physical Exam   Constitutional:   PE deferred to Primary Team d/t COVID-19 precautions; reviewed 4/24/20 Progress Note by Dr. Calzada   Nursing note and vitals reviewed.      Significant Labs:  All labs within the past 24 hours have been reviewed.     Significant Imaging:  Labs: Reviewed  US: Reviewed    Assessment/Plan:     * COVID-19 virus infection  Managed by Primary Team    ESRD on dialysis  S/p HD early this AM x 3hrs with 1.7L removed  Tentatively plan for HD tomorrow  Meds to renal parameters  CBC, renal function panel with labs    Hgb 8.9  -ferritin >03425 4/22/20  -started epogen  -corrected Ca+ 10.88  Ph- (add-on)  -most recent 4/13/20  Alb 1.9    On dysphagia diet  -on Novasource renal with TF    /79  -on midodrine PRN  Void x 1 4/24/20 (per I/O)  99%/NC     [facility]  Dr. Lemus  TTS  3:45  AVF  DW 52.5kg                Thank you for your consult. I will follow-up with patient. Please contact us if you have any additional questions.    SEAN Figueroa  Nephrology  [facility]

## 2020-04-24 NOTE — PLAN OF CARE
Problem: Wound  Goal: Optimal Wound Healing  Outcome: Ongoing, Progressing     Problem: Fall Injury Risk  Goal: Absence of Fall and Fall-Related Injury  Outcome: Ongoing, Progressing     Problem: Adult Inpatient Plan of Care  Goal: Plan of Care Review  Outcome: Ongoing, Progressing  Goal: Patient-Specific Goal (Individualization)  Outcome: Ongoing, Progressing  Goal: Absence of Hospital-Acquired Illness or Injury  Outcome: Ongoing, Progressing  Goal: Optimal Comfort and Wellbeing  Outcome: Ongoing, Progressing  Goal: Readiness for Transition of Care  Outcome: Ongoing, Progressing  Goal: Rounds/Family Conference  Outcome: Ongoing, Progressing     Problem: Diabetes Comorbidity  Goal: Blood Glucose Level Within Desired Range  Outcome: Ongoing, Progressing     Problem: Skin Injury Risk Increased  Goal: Skin Health and Integrity  Outcome: Ongoing, Progressing     Problem: Diabetes Comorbidity  Goal: Blood Glucose Level Within Desired Range  Outcome: Ongoing, Progressing     Problem: Skin Injury Risk Increased  Goal: Skin Health and Integrity  Outcome: Ongoing, Progressing     Problem: Device-Related Complication Risk (Hemodialysis)  Goal: Safe, Effective Therapy Delivery  Outcome: Ongoing, Progressing     Problem: Hemodynamic Instability (Hemodialysis)  Goal: Vital Signs Remain in Desired Range  Outcome: Ongoing, Progressing     Problem: Infection (Hemodialysis)  Goal: Absence of Infection Signs/Symptoms  Outcome: Ongoing, Progressing

## 2020-04-24 NOTE — PROGRESS NOTES
Hospital Medicine  History and Physical  [facility]      Patient Name: Vaughn Retana  MRN:  [id number]  Hospital Medicine Team: [facility] HOSP MED K Vania Calzada MD  Date of Admission:  4/22/2020     Length of Stay:  LOS: 2 days     Principal Problem: Suspected Covid-19 Virus Infection       History of Present Illness:    Mr. Vaughn Retana is a 54 y.o. male with hypertension, intracranial bleed, end-stage renal on dialysis MWF,L BKA 2/2 osteomyelitis, history of DVT, history of heart failure including pulmonary hypertension with normal EF (last 2017), hx upper GI bleeds, PEG status, seizures, latent TB presented from [facility] for evaluation of shortness of breath.  Patient isn't able to speak, so history is obtained via yes/no questions and ER documentation.  He denies any SOB, cough, fever, or chills and says he feels ok with no pain.  Of note, he just had an extensive hospitalization at [facility] from 3/28-4/15 for COVID.  At the end of his hospital course, he was transfused blood and decided to maintain a conservative approach so GI wasn't involved.  Additionally, he was DNR during the last hospital stay.    Upon arrival to the ER, vitals were temp 98.9F with HR 110s and satting 100% on 2L NC.  CXR showed improved consolidations from his recent hospitalization.  He was given Vanc and Zosyn and was admitted to Hospital Medicine for further management.    Interval History:   Abx administered pending cultures. Concern for G+ cocci in blood sample--on vancomycin redosed this AM following HD. Episode of hypotension after HD--midodrine and NS bolus ordered. . Increase in inflammatory markers noted. Hg low on admission--GI has evaluated, no plan for elective procedure at this time. D-dimer at 5-- concern for GI bleeding risk. Concern for another infectious process than covid at this time.       Review of Systems:  Unable to obtain 2/2 aphasia      Past Medical History: Patient has a  past medical history of Amputation stump pain (9/10/2013), Aspiration pneumonia (7/27/2015), Asterixis (11/8/2016), C. difficile colitis (8/7/2015), Cholelithiasis without obstruction (8/25/2015), Chronic diastolic heart failure, Chronic low back pain (12/1/2015), Closed head injury (9/8/2016), DVT (deep venous thrombosis) (7/28/2017), ESRD on hemodialysis (2/7/2013), GERD (gastroesophageal reflux disease), HCV antibody positive, Hemiparesis affecting left side as late effect of stroke (11/08/2016), History of Intracerebral Hemorrhage: L BG 5/2013; R BG 9/2016; R BG 11/2016; L caudate head 2/2017 (11/2/2016), Hypertension, left basal ganglia ICH 5/2013 (11/2/2016), Left Caudate Head ICH 2/22/2017 (2/24/2017), 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      Past Surgical History: Patient has a past surgical history that includes R AVF (9/12/12); Leg amputation through knee (12/18/2013); Foot amputation through metatarsal; Colonoscopy; Colonoscopy (N/A, 4/4/2017); Esophagogastroduodenoscopy (N/A, 6/12/2018); Upper gastrointestinal endoscopy; Esophagogastroduodenoscopy (N/A, 3/7/2019); Esophagogastroduodenoscopy (N/A, 9/23/2019); Esophagogastroduodenoscopy (N/A, 10/2/2019); Esophagogastroduodenoscopy (N/A, 11/12/2019); and Esophagogastroduodenoscopy (N/A, 2/14/2020).      Social History: Patient reports that he has quit smoking. He has a 10.00 pack-year smoking history. He has never used smokeless tobacco. He reports that he does not drink alcohol or use drugs.      Family History: Patient's family history includes Alcohol abuse in his maternal grandmother; Diabetes in his  brother and maternal grandfather; Early death in his mother; Heart disease in his father; Hyperlipidemia in his father; Hypertension in his father and sister; Kidney disease in his father.      Medications: Scheduled Meds:   sodium chloride 0.9%   Intravenous Once    [START ON 4/25/2020] sodium chloride 0.9%   Intravenous Once    albuterol  2 puff Inhalation Q6H    levetiracetam oral soln  250 mg Oral BID    metoclopramide HCl  10 mg Oral QID    pantoprazole  40 mg Oral BID    piperacillin-tazobactam (ZOSYN) IVPB  4.5 g Intravenous Q12H    sevelamer carbonate  800 mg Oral TID WM    vancomycin (VANCOCIN) IVPB  1,250 mg Intravenous Once     Continuous Infusions:   heparin (porcine) in D5W       PRN Meds:.sodium chloride, sodium chloride 0.9%, [START ON 4/25/2020] sodium chloride 0.9%, acetaminophen, dextromethorphan-guaifenesin  mg/5 ml, Dextrose 10% Bolus, Dextrose 10% Bolus, glucagon (human recombinant), glucose, glucose, loperamide, melatonin, midodrine, ondansetron, sodium chloride 0.9%, Pharmacy to dose Vancomycin consult **AND** vancomycin - pharmacy to dose      Allergies: Patient is allergic to fosrenol [lanthanum].      Physical Exam:    Temp:  [97.7 °F (36.5 °C)-101 °F (38.3 °C)]   Pulse:  [110-136]   Resp:  [19-30]   BP: ()/(45-83)   SpO2:  [92 %-99 %]     Constitutional: Appears sickly  Head: Normocephalic and atraumatic.   Eyes: EOM are normal. Pupils are equal, round, and reactive to light. No scleral icterus.   Neck: Normal range of motion. Neck supple.   Cardiovascular: Normal heart rate.  Regular heart rhythm.  No murmur heard.  Pulmonary/Chest: Comfortable on 2L NC.  Coarse breath sounds throughout  Abdominal: Soft. Bowel sounds are normal.  No distension.  No tenderness  Musculoskeletal: Left BKA.  Right leg contractures  Neurological: Alert and making eye contact.  Not answering questions  Skin: Skin is warm and dry.   Psychiatric: Normal mood and affect. Behavior is normal.        Laboratory:  Recent Labs   Lab 04/22/20 1849 04/23/20  1151   WBC 15.60* 15.73*   LYMPH 7.9*  1.2 6.9*  1.1   HGB 7.2* 8.9*   HCT 23.5* 27.4*    201     Recent Labs   Lab 04/22/20 2016      K 4.3   CL 99   CO2 23   BUN 50*   CREATININE 6.2*   *   CALCIUM 9.2     Recent Labs   Lab 04/22/20 2016   ALKPHOS 79   ALT 14   AST 49*   ALBUMIN 1.9*   PROT 8.3   BILITOT 0.5      Recent Labs     04/22/20  1849 04/22/20 2016 04/23/20  0846   DDIMER  --   --  5.18*   FERRITIN >26,000*  --   --    CRP  --  270.0*  --    LDH  --  226  --    TROPONINI 0.439*  --   --    LACTATE 1.6  --   --        All labs within the last 24 hours were reviewed.     Microbiology:  Lab Results   Component Value Date    [id number] Positive (A) 04/22/2020       Microbiology Results (last 7 days)     Procedure Component Value Units Date/Time    Blood culture [[id number]]     Order Status:  Sent Specimen:  Blood     Blood culture [[id number]]     Order Status:  Sent Specimen:  Blood     Blood culture x two cultures. Draw prior to antibiotics. [[id number]] Collected:  04/22/20 1919    Order Status:  Completed Specimen:  Blood from Peripheral, Forearm, Left Updated:  04/24/20 0100     Blood Culture, Routine Gram stain miya bottle: Gram positive cocci in clusters resembling Staph       Results called to and read back by:Marco Lucero RN 04/24/2020  01:00    Narrative:       Aerobic and anaerobic    Blood culture x two cultures. Draw prior to antibiotics. [[id number]] Collected:  04/22/20 1849    Order Status:  Completed Specimen:  Blood from Peripheral, Antecubital, Left Updated:  04/23/20 2212     Blood Culture, Routine No Growth to date      No Growth to date    Narrative:       Aerobic and anaerobic    Culture, Respiratory with Gram Stain [[id number]]     Order Status:  No result Specimen:  Sputum, Expectorated             Imaging  ECG Results          EKG 12-lead (Edited Result - FINAL)  Result time 04/23/20 12:54:59     Edited Result - FINAL by Interface, Lab In [facility] (04/23/20 12:54:59)                 Narrative:    Test Reason : R06.00,    Vent. Rate : 113 BPM     Atrial Rate : 113 BPM     P-R Int : 132 ms          QRS Dur : 094 ms      QT Int : 342 ms       P-R-T Axes : 082 067 083 degrees     QTc Int : 469 ms    Age and gender specific analysis  Sinus tachycardia  Incomplete right bundle branch block  Nonspecific T wave abnormality  Cannot exclude Lateral infarct  Abnormal ECG  When compared with ECG of 28-MAR-2020 13:40,  Left anterior fascicular block is no longer Present  lateral infarct is now present  Reconfirmed by KYLER AVERY MD ([id number]) on 4/23/2020 12:54:46 PM    Referred By: [email or web address]   SELF           Confirmed By:KYLER AVERY MD                              No results found for this or any previous visit.    US Lower Extremity Veins Bilateral  Narrative: EXAMINATION:  US LOWER EXTREMITY VEINS BILATERAL    CLINICAL HISTORY:  elevated D-dimer;    TECHNIQUE:  Duplex and color flow Doppler and dynamic compression was performed of the bilateral lower extremity veins was performed.  Examination protocol was slightly modified secondary to patient's Covid status.    COMPARISON:  Ultrasound 11/06/2016    FINDINGS:  Right thigh veins: The common femoral, femoral, popliteal, and upper greater saphenous veins are patent and free of thrombus. The veins are normally compressible and have normal phasic flow and augmentation response.    Left thigh veins: Partially occlusive thrombus is present within the femoral vein, which may be chronic.  The common femoral, popliteal, and upper greater saphenous veins are patent and free of thrombus.  Impression: Partially occlusive thrombus within the left femoral vein, which may be chronic.    This report was flagged in Epic as abnormal.    COMMUNICATION  This critical result was discovered/received at 0847. The critical information above was relayed directly by telephone to Dr. Calzada  on 04/24/2020 at 0851 by Dr. Tripp on behalf of Dr. Oro.    Electronically signed by resident: Lul Tripp  Date:    04/24/2020  Time:    08:45    Electronically signed by: Kassy Oro MD  Date:    04/24/2020  Time:    08:59      All imaging within the last 24 hours was reviewed.       Assessment and Plan:    Active Hospital Problems    Diagnosis  POA    *COVID-19 virus infection [U07.1]  Yes    Palliative care encounter [Z51.5]  Not Applicable    Advanced care planning/counseling discussion [Z71.89]  Not Applicable    Goals of care, counseling/discussion [Z71.89]  Not Applicable    ESRD on dialysis [N18.6, Z99.2]  Not Applicable    Pressure injury due to medical device [T85.898A, L89.90]  Yes    Seizure [R56.9]  Yes    Chronic blood loss anemia [D50.0]  Yes    Hemodialysis-associated hypotension [I95.3]  Yes    Severe malnutrition [E43]  Yes     Assessment and Plan  Severe Malnutrition in the context of Social/Environmental Circumstances     Related to (etiology):  Unknown etiology     Signs and Symptoms (as evidenced by):  Energy Intake: per pt, <75% intake over the last year  Body Fat Depletion: overall subcutaneous fat loss, moderate triceps fat loss and protruding patellas.  Muscle Mass Depletion:  moderate muscle wasting of interosseous, temporal, clavicle, calves and quadriceps  Weight Loss: 24% (39 lbs) x 1 year    Recommendations     Recommendation/Intervention: 1. Should pt be cleared for po diet, recommend renal diet with texture per SLP along with Novasource ONS TID. 2. Should pt require enteral feeding, initiate Novasource renal formula at 10ml/hr and advance 10ml Q4hrs to goal rate of 40ml/hr (provides 1920kcal, 87g pro, 691ml free water). Provide additional 500ml water flush/24 hrs. Hold for residuals >500ml.  Goals: 1. Pt to receive nutrition < 48 hrs.      Erosive gastritis [K29.60]  Yes    Chronic upper GI bleeding [K92.2]  Yes    Chronic kidney disease-mineral and bone  disorder [N18.9, E83.9, M89.9]  Yes     Chronic    Chronic diastolic heart failure [I50.32]  Yes     Chronic     2-23-17    1 - Low normal to mildly depressed left ventricular systolic function (EF 50-55%).     2 - Right ventricular enlargement with mildly depressed systolic function.     3 - Left ventricular diastolic dysfunction.     4 - Right atrial enlargement.     5 - Severe tricuspid regurgitation.     6 - Pulmonary hypertension. The estimated PA systolic pressure is 86 mmHg.     7 - Increased central venous pressure.       History of Intracerebral Hemorrhage: L BG 5/2013; R BG 9/2016; R BG 11/2016; L caudate head 2/2017 [Z86.79]  Not Applicable     Chronic    Acute respiratory failure with hypoxia [J96.01]  Yes    Anemia in chronic kidney disease [N18.9, D63.1]  Yes     Chronic      Resolved Hospital Problems   No resolved problems to display.       Covid-19 Virus Infection  - COVID-19 testing: Positive on 3/25 and again on 4/22  - Isolation: Airborne/Droplet. Surgical mask on patient. Notify Infection Control  - Diagnostics: Trend Q48hrs if stable, more frequently if patient decompensating.  Lymphopenia, hyponatremia, hyperferritinemia, elevated troponin, elevated d-dimer, age, and comorbidities are significant predictors of poor clinical outcome)  o CBC:  WBC 15  o CMP:  ESRD on HD  o Ferritin:    o D-dimer:    o CRP:  270  o BNP:    o CPK:  957  o LDH:  226  o Troponin:  0.439  o Procalcitonin:  5.39  o CXR:  Improving consolidations  o Blood culture x2 4/22/2020 NGTD  o Sputum culture 4/22/2020 NGTD    - Management: Per [facility] COVID Treatment Protocol (4/15/20)    - Monitoring:   - Telemetry & Continuous Pulse Oximetry    - Nutrition:    - Multivitamin PO daily   - Add Boost supplement   - Vitamin D 1000IU daily if deficient   - Ascorbic acid 500mg PO bid    - Supportive Care:   - acetaminophen 650mg PO Q6hr PRN fever/headache   - loperamide PRN viral diarrhea   - IVF if indicated, restrictive  strategy preferred, no maintenance IV if able   - VTE PPx: enoxaparin or heparin SQ unless contraindicated    - Antibiotics:  - if indications, CXR findings, elevated procal. See protocol for alternatives.   - Discontinue early if low concern for bacterial co-infection   - Vanc/Zosyn given COVID and recent hospitalization    Acute Hypoxemic Respiratory Failure  Acute Respiratory Disease 2/2 COVID19  - Order RT consult via Respiratory Communication for COVID Protocols  - Order Incentive Spirometer Q4h  - Order Flutter Valve Q4h  - Continuous Pulse Oximetry  - Goal SpO2 92-96%  - Supplemental O2 via LFNC, VentiMask, or HFNC (see Respiratory Support Oxygen Therapies)  - If wheezing, albuterol INH Q6h scheduled & PRN  - Proning Protocol if patient is a candidate (see HM Proning Protocol)   - GCS >13, able to self-prone  - If deterioration, may warrant trial of NIPPV and transfer to [facility] pressure room or immediate ICU consult  - CXR with multifocal opacities  - Satting 100% on 2L NC  - Continue Vanc/Zosyn given recent hospitalization    Chronic Upper GIB  Erosive Gastritis  · Reports of recurrent and chronic GIB  · Last EGD 2/14/20 with non-bleeding erosive gastropathy  · Continue PPI BID  · GI consult given severe drop in anemia, no plans for EGD currently    Chronic Blood Loss Anemia  Anemia in CKD  · Hemoglobin 7.2 on admit with tachycardia  · Was 9 2 weeks ago at CT  · Type and screen ordered  · Transfuse 1 unit PRBCs  · Repeat Hg this AM of 8.9    History of ICH  · 2013  · Resides at [facility]    Chronic Diastolic Heart Failure  · Chronic and stable  · Not on BP medications    HD Associated Hypotension  · Chronic issue  · Continue Midodrine with HD MWF    CKD Mineral and Bone Disorder  ESRD on HD  · Chronic and stable  · Continue Renvela TID    Seizure Disorder  · Chronic and stable  · Continue Keppra 250mg BID  · Seizure precautions    Sacral Pressure Injury  · Wound Care consult    Lower Extremity DVT  - on  heparin gtt currently  - due to high bleeding risk hesistant to commit patient to long term oral anticoagulation especially as this may be a chronic thrombus.   - ordered CTA to assess if PE possible etiology of acute SOB.     Patient's chronic/stable medical conditions noted in the assessment above will be managed with the patient's home medications as tolerated.      Diet:  Renal pleasure feeds, TF  VTE PPx:  Heparin BID  Goals of Care:  Full code based on paperwork from NH.  Was DNR on previous admission.    Vania Calzada

## 2020-04-24 NOTE — CONSULTS
[facility]  Palliative Medicine  Consult Note    Patient Name: Vaughn Retana  MRN: [id number]  Admission Date: 4/22/2020  Hospital Length of Stay: 2 days  Code Status: Full Code   Attending Provider: Vania Calzada MD  Consulting Provider: SHELLIE Beck  Primary Care Physician: Cori Randall MD  Principal Problem:COVID-19 virus infection    Patient information was obtained from relative(s) and past medical records.      Consults  Assessment/Plan:     Palliative care encounter  Palliative Medicine Covid 19 Goals     Consult received by:   Halley WALDRON for goals of care and advanced care planning     Impression:  Mr. Retana is a 55 yo gentleman with PMH of hypertension, intracranial bleed, end-stage renal on dialysis MWF,L BKA 2/2 osteomyelitis, history of DVT, CHF, pulmonary hypertension, upper GI bleeds, PEG placement,  Seizures and  latent TB.  He [place] [facility] [place]  from [facility] for evaluation of shortness of breath. Mr. Retana is not able to hold  Conversation.  Able to jester and provide answers to yes or no questions. Extensive hospitalization at [facility] from 3/28-4/15 for COVID and GI bleed. Received blood transfusion and conservative medical management.  GI not consulted.       Current oxygen requirement is 2 liters nasal canula with sats 96 -100%       Advance Care Planning   Advance Care Planning /Goals of care     Living Will: no  LaPOST: no  Do Not Resuscitate Status: no  Medical Power of : no  Next of kin for medical decision making no    Decision-Making Capacity: patient is unable to answer questions   Next of kin for medical decisions:     Goals of Care:   - Unable to have conversation with Mr. Retana.  Spoke with his sister Vaishnavi Retana.  - Provided Ms. Retana with clinical updates and discussed current plan of care. -  - Discussed concern that his nutritional status is poor which can influence his chances of continued  recovery from COVID infection.  Has PEG and tube feedings.  Speech following.  - Family amenable to dietary changes as necessary.  - Ms. Retana states patient was doing well and feels the nursing home may have been to quick to send back to hospital.  She reports Mr. Retana often has weakness and shortness of breath after his HD  - Family is not amenable to DNR.  She agreed to this last admit because he was so sick.    - Explained with COVID 19 patient status may decline rapidly.  Ms. Retana is amenable to further discussion if he declines      Following goals established  - Continue current plan of care  - Family amenable to speech pathology and additional swallow study   - Family would like to receive regular updates and discuss need for escalation of care  - Not amenable to DNR       Recommendations:   - Continue current plan of care  - Discharge back to skilled nursing - [facility] when discharge criteria met.              Thank you for your consult. I will sign off. Please contact us if you have any additional questions.    Subjective:     HPI:   Mr. Retana is a 53 yo gentleman with PMH of hypertension, intracranial bleed, end-stage renal on dialysis MWF,L BKA 2/2 osteomyelitis, history of DVT, CHF, pulmonary hypertension, upper GI bleeds, PEG placement,  Seizures and  latent TB.  [place] [facility] [place]  [facility] for evaluation of shortness of breath. Mr. Retana is not able to hold  Conversation.  Able to jester and provide answers to yes or no questions. [place] [facility] [place]4/15 for COVID.  At the end of his hospital course, he was transfused blood and decided to maintain a conservative approach so GI wasn't involved.      Upon arrival to the ER, vitals were temp 98.9F with HR 110s and satting 100% on 2L NC.  CXR showed improved consolidations from his recent hospitalization.  He was given Vanc and Zosyn and was admitted to Hospital Medicine for  further management.     Patient had DNR order at last hospitalization.     Palliative medicine consulted for goals of care and advanced care planning     Hospital Course:  No notes on file    Interval History:   Past Medical History:   Diagnosis Date    Amputation stump pain 9/10/2013    Aspiration pneumonia 7/27/2015    Asterixis 11/8/2016    C. difficile colitis 8/7/2015    Cholelithiasis without obstruction 8/25/2015    Chronic diastolic heart failure     2-23-17   1 - Low normal to mildly depressed left ventricular systolic function (EF 50-55%).    2 - Right ventricular enlargement with mildly depressed systolic function.    3 - Left ventricular diastolic dysfunction.    4 - Right atrial enlargement.    5 - Severe tricuspid regurgitation.    6 - Pulmonary hypertension. The estimated PA systolic pressure is 86 mmHg.    7 - Increased central venous pressure.    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    Metabolic acidosis, IAG, reduced excretion of inorganic acids     Myoclonic jerking 9/20/2016    Noncompliance with medication regimen 12/4/2018    Secondary hyperparathyroidism (of renal origin)     Secondary pulmonary hypertension 3/23/2017    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    TB lung, latent 08/25/2015    Negative Quantiferon Gold 3-23-17       Past Surgical History:   Procedure Laterality Date     COLONOSCOPY      COLONOSCOPY N/A 4/4/2017    Procedure: COLONOSCOPY;  Surgeon: Walker Stern MD;  Location: [facility] (2ND FLR);  Service: Endoscopy;  Laterality: N/A;  PA Systolic Pressure 85.56. HD Patient MWF, K+ lab prior to procedure.     ESOPHAGOGASTRODUODENOSCOPY N/A 6/12/2018    Procedure: EGD (ESOPHAGOGASTRODUODENOSCOPY);  Surgeon: Man Galicia MD;  Location: [facility] (2ND FLR);  Service: Endoscopy;  Laterality: N/A;  EGD in 8-12 weeks with Dr. Galicia on 4th floor for follow up erosive esophagitis and Mejia's surveillance.    Patient should be on Pantoprazole 40mg every 12 hours or the equivulant of another PPI    awaiting for patient to reply back regarding changing    ESOPHAGOGASTRODUODENOSCOPY N/A 3/7/2019    Procedure: EGD (ESOPHAGOGASTRODUODENOSCOPY);  Surgeon: Man Galicia MD;  [facility]  Service: Endoscopy;  Laterality: N/A;    ESOPHAGOGASTRODUODENOSCOPY N/A 9/23/2019    Procedure: EGD (ESOPHAGOGASTRODUODENOSCOPY);  Surgeon: Keanu Rainey MD;  Location: [facility] (2ND FLR);  Service: Endoscopy;  Laterality: N/A;    ESOPHAGOGASTRODUODENOSCOPY N/A 10/2/2019    Procedure: EGD (ESOPHAGOGASTRODUODENOSCOPY);  Surgeon: Kevin De La Paz MD;  [facility]  Service: Endoscopy;  Laterality: N/A;    ESOPHAGOGASTRODUODENOSCOPY N/A 11/12/2019    Procedure: EGD (ESOPHAGOGASTRODUODENOSCOPY);  Surgeon: Kevin De La Paz MD;  Location: [facility] (2ND FLR);  Service: Endoscopy;  Laterality: N/A;    ESOPHAGOGASTRODUODENOSCOPY N/A 2/14/2020    Procedure: EGD (ESOPHAGOGASTRODUODENOSCOPY);  Surgeon: Kevin De La Paz MD;  Location: [facility] (2ND FLR);  Service: Endoscopy;  Laterality: N/A;    FOOT AMPUTATION THROUGH METATARSAL      left foot    LEG AMPUTATION THROUGH KNEE  12/18/2013    left BKA    R AVF  9/12/12    UPPER GASTROINTESTINAL ENDOSCOPY         Review of patient's allergies indicates:   Allergen Reactions    Fosrenol [lanthanum] Nausea And Vomiting      Nausea and vomiting       Medications:  Continuous Infusions:   heparin (porcine) in D5W       Scheduled Meds:   sodium chloride 0.9%   Intravenous Once    albuterol  2 puff Inhalation Q6H    levetiracetam oral soln  250 mg Oral BID    metoclopramide HCl  10 mg Oral QID    midodrine  5 mg Oral Every Tues, Thurs, Sat    pantoprazole  40 mg Oral BID    piperacillin-tazobactam (ZOSYN) IVPB  4.5 g Intravenous Q12H    sevelamer carbonate  800 mg Oral TID WM    vancomycin (VANCOCIN) IVPB  1,250 mg Intravenous Once     PRN Meds:sodium chloride, sodium chloride 0.9%, acetaminophen, dextromethorphan-guaifenesin  mg/5 ml, Dextrose 10% Bolus, Dextrose 10% Bolus, glucagon (human recombinant), glucose, glucose, loperamide, melatonin, ondansetron, sodium chloride 0.9%, Pharmacy to dose Vancomycin consult **AND** vancomycin - pharmacy to dose    Family History     Problem Relation (Age of Onset)    Alcohol abuse Maternal Grandmother    Diabetes Brother, Maternal Grandfather    Early death Mother    Heart disease Father    Hyperlipidemia Father    Hypertension Father, Sister    Kidney disease Father        Tobacco Use    Smoking status: Former Smoker     Packs/day: 1.00     Years: 10.00     Pack years: 10.00    Smokeless tobacco: Never Used   Substance and Sexual Activity    Alcohol use: No    Drug use: No    Sexual activity: Yes     Partners: Female     Birth control/protection: None       Review of Systems   Unable to perform ROS: Patient nonverbal     Objective:     Vital Signs (Most Recent):  Temp: 97.7 °F (36.5 °C) (04/24/20 0540)  Pulse: (!) 126 (04/24/20 0721)  Resp: (!) 21 (04/24/20 0721)  BP: 115/76 (04/24/20 0634)  SpO2: 96 % (04/24/20 0721) Vital Signs (24h Range):  Temp:  [97.7 °F (36.5 °C)-101 °F (38.3 °C)] 97.7 °F (36.5 °C)  Pulse:  [110-132] 126  Resp:  [17-25] 21  SpO2:  [92 %-99 %] 96 %  BP: ()/(45-76) 115/76     Weight: 44.9 kg (98 lb 15.8 oz)  Body mass index is 15.98  kg/m².    Review of Symptoms  Symptom Assessment (ESAS 0-10 scale)   ESAS 0 1 2 3 4 5 6 7 8 9 10   Pain              Dyspnea              Anxiety              Nausea              Depression               Anorexia              Fatigue              Insomnia              Restlessness               Agitation              CAM / Delirium __ --  ___+   Constipation     __ --  ___+   Diarrhea           __ --  ___+  Bowel Management Plan (BMP): Yes    Comments: no complains of pain or shortness of breath, No pain medications ordered,  Supplemental oxygen in use     Performance Status: 40    ECOG Performance Status Grade: 3 - Confined to bed or chair 50% of waking hours    Physical Exam :liimited physical exam secondary to COVID 19 isolation. Physical exam as per primary team     Significant Labs: All pertinent labs within the past 24 hours have been reviewed.  CBC:   Recent Labs   Lab 04/23/20  1151   WBC 15.73*   HGB 8.9*   HCT 27.4*   MCV 88        BMP:  No results for input(s): GLU, NA, K, CL, CO2, BUN, CREATININE, CALCIUM, MG in the last 24 hours.  LFT:  Lab Results   Component Value Date    AST 49 (H) 04/22/2020    ALKPHOS 79 04/22/2020    BILITOT 0.5 04/22/2020     Albumin:   Albumin   Date Value Ref Range Status   04/22/2020 1.9 (L) 3.5 - 5.2 g/dL Final     Protein:   Total Protein   Date Value Ref Range Status   04/22/2020 8.3 6.0 - 8.4 g/dL Final     Lactic acid:   Lab Results   Component Value Date    LACTATE 1.6 04/22/2020    LACTATE 1.1 03/28/2020       Significant Imaging: I have reviewed all pertinent imaging results/findings within the past 24 hours.     CXR 4/22/2020 Interval improved aeration of both lungs with residual mild patchy opacities at the left greater than right lung bases from 03/28/2020 exam.  No definite new focal opacity, pleural effusion or pneumothorax.    Living Arrangements: lives at [facility]     Psychosocial/Cultural:    Spiritual:     F- Bindu and Belief:   I -  Importance: .  C - Community:     A - Address in Care:       > 50% of  70 min visit spent in chart review, discussion of goals of care,  symptom assessment, coordination of care and emotional support.    MARY Bragg, APRN, ACNS-BC  Palliative Medicine  [facility]

## 2020-04-24 NOTE — ASSESSMENT & PLAN NOTE
S/p HD early this AM x 3hrs with 1.7L removed  Tentatively plan for HD tomorrow  Meds to renal parameters  CBC, renal function panel with labs    Hgb 8.9  -ferritin >14301 4/22/20  -started epogen  -corrected Ca+ 10.88  Ph- (add-on)  -most recent 4/13/20  Alb 1.9    On dysphagia diet  -on Novasource renal with TF    /79  -on midodrine PRN    [facility]  Dr. Lemus  TTS  3:45  AVF  DW 52.5kg

## 2020-04-24 NOTE — PROGRESS NOTES
3 hour hemodialysis tx completed.  Total UF of 1700 cc. One hypotensive episode at end of tx, responded to fluid bolus.

## 2020-04-24 NOTE — RESPIRATORY THERAPY
Rapid Response Respiratory Chart Check     Chart check completed, Pt Spo2 96% on NC 2L. Bedside RT, ELINA Serna contacted, no concerns verbalized at this time, instructed to call [phone] for further concerns or assistance.

## 2020-04-24 NOTE — SUBJECTIVE & OBJECTIVE
Interval History:   Past Medical History:   Diagnosis Date    Amputation stump pain 9/10/2013    Aspiration pneumonia 7/27/2015    Asterixis 11/8/2016    C. difficile colitis 8/7/2015    Cholelithiasis without obstruction 8/25/2015    Chronic diastolic heart failure     2-23-17   1 - Low normal to mildly depressed left ventricular systolic function (EF 50-55%).    2 - Right ventricular enlargement with mildly depressed systolic function.    3 - Left ventricular diastolic dysfunction.    4 - Right atrial enlargement.    5 - Severe tricuspid regurgitation.    6 - Pulmonary hypertension. The estimated PA systolic pressure is 86 mmHg.    7 - Increased central venous pressure.     Chronic low back pain 12/1/2015    Closed head injury 9/8/2016    DVT (deep venous thrombosis) 7/28/2017    ESRD on hemodialysis 2/7/2013    MWF at [facility]   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    Metabolic acidosis, IAG, reduced excretion of inorganic acids     Myoclonic jerking 9/20/2016    Noncompliance with medication regimen 12/4/2018    Secondary hyperparathyroidism (of renal origin)     Secondary pulmonary hypertension 3/23/2017    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    TB lung, latent 08/25/2015    Negative Quantiferon Gold 3-23-17       Past Surgical History:   Procedure Laterality Date    COLONOSCOPY      COLONOSCOPY N/A 4/4/2017    Procedure: COLONOSCOPY;  Surgeon: Walker Stern MD;  Location: [facility] ([address] [place] [address]);  Service: Endoscopy;  Laterality: N/A;  PA Systolic Pressure 85.56. HD  Patient MWF, K+ lab prior to procedure.     ESOPHAGOGASTRODUODENOSCOPY N/A 6/12/2018    Procedure: EGD (ESOPHAGOGASTRODUODENOSCOPY);  Surgeon: Man Galicia MD;  Location: [facility] (2ND FLR);  Service: Endoscopy;  Laterality: N/A;  EGD in 8-12 weeks with Dr. Galicia on 4th floor for follow up erosive esophagitis and Mejia's surveillance.    Patient should be on Pantoprazole 40mg every 12 hours or the equivulant of another PPI    awaiting for patient to reply back regarding changing    ESOPHAGOGASTRODUODENOSCOPY N/A 3/7/2019    Procedure: EGD (ESOPHAGOGASTRODUODENOSCOPY);  Surgeon: Man Galicia MD;  Location: [facility] (2ND FLR);  Service: Endoscopy;  Laterality: N/A;    ESOPHAGOGASTRODUODENOSCOPY N/A 9/23/2019    Procedure: EGD (ESOPHAGOGASTRODUODENOSCOPY);  Surgeon: Keanu Rainey MD;  Location: [facility] (2ND FLR);  Service: Endoscopy;  Laterality: N/A;    ESOPHAGOGASTRODUODENOSCOPY N/A 10/2/2019    Procedure: EGD (ESOPHAGOGASTRODUODENOSCOPY);  Surgeon: Kevin De La Paz MD;  Location: [facility] (2ND FLR);  Service: Endoscopy;  Laterality: N/A;    ESOPHAGOGASTRODUODENOSCOPY N/A 11/12/2019    Procedure: EGD (ESOPHAGOGASTRODUODENOSCOPY);  Surgeon: Kevin De La Paz MD;  Location: [facility] ([facility] F[facility]);  Service: Endoscopy;  Laterality: N/A;    ESOPHAGOGASTRODUODENOSCOPY N/A 2/14/2020    Procedure: EGD (ESOPHAGOGASTRODUODENOSCOPY);  Surgeon: Kevin De La Paz MD;  Location: [facility] ([facility] FLR);  Service: Endoscopy;  Laterality: N/A;    FOOT AMPUTATION THROUGH METATARSAL      left foot    LEG AMPUTATION THROUGH KNEE  12/18/2013    left BKA    R AVF  9/12/12    UPPER GASTROINTESTINAL ENDOSCOPY         Review of patient's allergies indicates:   Allergen Reactions    Fosrenol [lanthanum] Nausea And Vomiting     Nausea and vomiting       Medications:  Continuous Infusions:   heparin (porcine) in D5W       Scheduled Meds:   sodium chloride 0.9%   Intravenous Once    albuterol  2 puff Inhalation Q6H     levetiracetam oral soln  250 mg Oral BID    metoclopramide HCl  10 mg Oral QID    midodrine  5 mg Oral Every Tues, Thurs, Sat    pantoprazole  40 mg Oral BID    piperacillin-tazobactam (ZOSYN) IVPB  4.5 g Intravenous Q12H    sevelamer carbonate  800 mg Oral TID WM    vancomycin (VANCOCIN) IVPB  1,250 mg Intravenous Once     PRN Meds:sodium chloride, sodium chloride 0.9%, acetaminophen, dextromethorphan-guaifenesin  mg/5 ml, Dextrose 10% Bolus, Dextrose 10% Bolus, glucagon (human recombinant), glucose, glucose, loperamide, melatonin, ondansetron, sodium chloride 0.9%, Pharmacy to dose Vancomycin consult **AND** vancomycin - pharmacy to dose    Family History     Problem Relation (Age of Onset)    Alcohol abuse Maternal Grandmother    Diabetes Brother, Maternal Grandfather    Early death Mother    Heart disease Father    Hyperlipidemia Father    Hypertension Father, Sister    Kidney disease Father        Tobacco Use    Smoking status: Former Smoker     Packs/day: 1.00     Years: 10.00     Pack years: 10.00    Smokeless tobacco: Never Used   Substance and Sexual Activity    Alcohol use: No    Drug use: No    Sexual activity: Yes     Partners: Female     Birth control/protection: None       Review of Systems   Unable to perform ROS: Patient nonverbal     Objective:     Vital Signs (Most Recent):  Temp: 97.7 °F (36.5 °C) (04/24/20 0540)  Pulse: (!) 126 (04/24/20 0721)  Resp: (!) 21 (04/24/20 0721)  BP: 115/76 (04/24/20 0634)  SpO2: 96 % (04/24/20 0721) Vital Signs (24h Range):  Temp:  [97.7 °F (36.5 °C)-101 °F (38.3 °C)] 97.7 °F (36.5 °C)  Pulse:  [110-132] 126  Resp:  [17-25] 21  SpO2:  [92 %-99 %] 96 %  BP: ()/(45-76) 115/76     Weight: 44.9 kg (98 lb 15.8 oz)  Body mass index is 15.98 kg/m².    Review of Symptoms  Symptom Assessment (ESAS 0-10 scale)   ESAS 0 1 2 3 4 5 6 7 8 9 10   Pain              Dyspnea              Anxiety              Nausea              Depression                Anorexia              Fatigue              Insomnia              Restlessness               Agitation              CAM / Delirium __ --  ___+   Constipation     __ --  ___+   Diarrhea           __ --  ___+  Bowel Management Plan (BMP): Yes    Comments: no complains of pain or shortness of breath, No pain medications ordered,  Supplemental oxygen in use     Performance Status: 40    ECOG Performance Status Grade: 3 - Confined to bed or chair 50% of waking hours    Physical Exam :liimited physical exam secondary to COVID 19 isolation. Physical exam as per primary team     Significant Labs: All pertinent labs within the past 24 hours have been reviewed.  CBC:   Recent Labs   Lab 04/23/20  1151   WBC 15.73*   HGB 8.9*   HCT 27.4*   MCV 88        BMP:  No results for input(s): GLU, NA, K, CL, CO2, BUN, CREATININE, CALCIUM, MG in the last 24 hours.  LFT:  Lab Results   Component Value Date    AST 49 (H) 04/22/2020    ALKPHOS 79 04/22/2020    BILITOT 0.5 04/22/2020     Albumin:   Albumin   Date Value Ref Range Status   04/22/2020 1.9 (L) 3.5 - 5.2 g/dL Final     Protein:   Total Protein   Date Value Ref Range Status   04/22/2020 8.3 6.0 - 8.4 g/dL Final     Lactic acid:   Lab Results   Component Value Date    LACTATE 1.6 04/22/2020    LACTATE 1.1 03/28/2020       Significant Imaging: I have reviewed all pertinent imaging results/findings within the past 24 hours.     CXR 4/22/2020 Interval improved aeration of both lungs with residual mild patchy opacities at the left greater than right lung bases from 03/28/2020 exam.  No definite new focal opacity, pleural effusion or pneumothorax.    Living Arrangements: lives at [facility]     Psychosocial/Cultural:    Spiritual:     F- Bindu and Belief:   I - Importance: .  C - Community:     A - Address in Care:

## 2020-04-24 NOTE — HPI
Mr. Retana is a 55 yo gentleman with PMH of hypertension, intracranial bleed, end-stage renal on dialysis MWF,L BKA 2/2 osteomyelitis, history of DVT, CHF, pulmonary hypertension, upper GI bleeds, PEG placement,  Seizures and  latent TB.  He presented to [facility]  from [facility] for evaluation of shortness of breath. Mr. Retana is not able to hold  Conversation.  Able to jester and provide answers to yes or no questions. Extensive hospitalization at [facility] from 3/28-4/15 for COVID.  At the end of his hospital course, he was transfused blood and decided to maintain a conservative approach so GI wasn't involved.      Upon arrival to the ER, vitals were temp 98.9F with HR 110s and satting 100% on 2L NC.  CXR showed improved consolidations from his recent hospitalization.  He was given Vanc and Zosyn and was admitted to Hospital Medicine for further management.     Patient had DNR order at last hospitalization.     Palliative medicine consulted for goals of care and advanced care planning

## 2020-04-24 NOTE — PROGRESS NOTES
Hospital Medicine  History and Physical  [facility]      Patient Name: Vaughn Retana  MRN:  [id number]  Hospital Medicine Team: [facility] K Vania Calzada MD  Date of Admission:  4/22/2020     Length of Stay:  LOS: 2 days     Principal Problem: Suspected Covid-19 Virus Infection       History of Present Illness:    Mr. Vaughn Retana is a 54 y.o. male with hypertension, intracranial bleed, end-stage renal on dialysis MWF,L BKA 2/2 osteomyelitis, history of DVT, history of heart failure including pulmonary hypertension with normal EF (last 2017), hx upper GI bleeds, PEG status, seizures, latent TB presented from [facility] for evaluation of shortness of breath.  Patient isn't able to speak, so history is obtained via yes/no questions and ER documentation.  He denies any SOB, cough, fever, or chills and says he feels ok with no pain.  Of note, he just had an extensive hospitalization at [facility] from 3/28-4/15 for COVID.  At the end of his hospital course, he was transfused blood and decided to maintain a conservative approach so GI wasn't involved.  Additionally, he was DNR during the last hospital stay.    Upon arrival to the ER, vitals were temp 98.9F with HR 110s and satting 100% on 2L NC.  CXR showed improved consolidations from his recent hospitalization.  He was given Vanc and Zosyn and was admitted to Hospital Medicine for further management.    Interval History:   Abx administered pending cultres. Increase in inflammatory markers noted. Hg low on admission--GI has evaluated, no plan for elective procedure at this time. D-dimer at 5-- concern for GI bleeding risk so will check ultrasound legs and reassess. Concern for another infectious process than covid at this time.       Review of Systems:  Unable to obtain 2/2 aphasia      Past Medical History: Patient has a past medical history of Amputation stump pain (9/10/2013), Aspiration pneumonia (7/27/2015), Asterixis  (11/8/2016), C. difficile colitis (8/7/2015), Cholelithiasis without obstruction (8/25/2015), Chronic diastolic heart failure, Chronic low back pain (12/1/2015), Closed head injury (9/8/2016), DVT (deep venous thrombosis) (7/28/2017), ESRD on hemodialysis (2/7/2013), GERD (gastroesophageal reflux disease), HCV antibody positive, Hemiparesis affecting left side as late effect of stroke (11/08/2016), History of Intracerebral Hemorrhage: L BG 5/2013; R BG 9/2016; R BG 11/2016; L caudate head 2/2017 (11/2/2016), Hypertension, left basal ganglia ICH 5/2013 (11/2/2016), Left Caudate Head ICH 2/22/2017 (2/24/2017), 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      Past Surgical History: Patient has a past surgical history that includes R AVF (9/12/12); Leg amputation through knee (12/18/2013); Foot amputation through metatarsal; Colonoscopy; Colonoscopy (N/A, 4/4/2017); Esophagogastroduodenoscopy (N/A, 6/12/2018); Upper gastrointestinal endoscopy; Esophagogastroduodenoscopy (N/A, 3/7/2019); Esophagogastroduodenoscopy (N/A, 9/23/2019); Esophagogastroduodenoscopy (N/A, 10/2/2019); Esophagogastroduodenoscopy (N/A, 11/12/2019); and Esophagogastroduodenoscopy (N/A, 2/14/2020).      Social History: Patient reports that he has quit smoking. He has a 10.00 pack-year smoking history. He has never used smokeless tobacco. He reports that he does not drink alcohol or use drugs.      Family History: Patient's family history includes Alcohol abuse in his maternal grandmother; Diabetes in his brother and maternal grandfather; Early death in his mother; Heart disease in his father; Hyperlipidemia  in his father; Hypertension in his father and sister; Kidney disease in his father.      Medications: Scheduled Meds:   sodium chloride 0.9%   Intravenous Once    albuterol  2 puff Inhalation Q6H    heparin (porcine)  5,000 Units Subcutaneous Q12H    levetiracetam oral soln  250 mg Oral BID    metoclopramide HCl  10 mg Oral QID    midodrine  10 mg Oral Once    midodrine  5 mg Oral Every Tues, Thurs, Sat    pantoprazole  40 mg Oral BID    piperacillin-tazobactam (ZOSYN) IVPB  4.5 g Intravenous Q12H    sevelamer carbonate  800 mg Oral TID WM    sodium chloride 0.9%  500 mL Intravenous Once    vancomycin (VANCOCIN) IVPB  1,500 mg Intravenous Once     Continuous Infusions:  PRN Meds:.sodium chloride, sodium chloride 0.9%, acetaminophen, dextromethorphan-guaifenesin  mg/5 ml, Dextrose 10% Bolus, Dextrose 10% Bolus, glucagon (human recombinant), glucose, glucose, loperamide, melatonin, ondansetron, sodium chloride 0.9%, Pharmacy to dose Vancomycin consult **AND** vancomycin - pharmacy to dose      Allergies: Patient is allergic to fosrenol [lanthanum].      Physical Exam:    Temp:  [98.7 °F (37.1 °C)-101 °F (38.3 °C)]   Pulse:  [110-132]   Resp:  [17-22]   BP: ()/(45-72)   SpO2:  [92 %-99 %]     Constitutional: Appears sickly  Head: Normocephalic and atraumatic.   Eyes: EOM are normal. Pupils are equal, round, and reactive to light. No scleral icterus.   Neck: Normal range of motion. Neck supple.   Cardiovascular: Normal heart rate.  Regular heart rhythm.  No murmur heard.  Pulmonary/Chest: Comfortable on 2L NC.  Coarse breath sounds throughout  Abdominal: Soft. Bowel sounds are normal.  No distension.  No tenderness  Musculoskeletal: Left BKA.  Right leg contractures  Neurological: Alert and making eye contact.  Not answering questions  Skin: Skin is warm and dry.   Psychiatric: Normal mood and affect. Behavior is normal.       Laboratory:  Recent Labs   Lab 04/22/20  1849 04/23/20  1151   WBC  15.60* 15.73*   LYMPH 7.9*  1.2 6.9*  1.1   HGB 7.2* 8.9*   HCT 23.5* 27.4*    201     Recent Labs   Lab 04/22/20 2016      K 4.3   CL 99   CO2 23   BUN 50*   CREATININE 6.2*   *   CALCIUM 9.2     Recent Labs   Lab 04/22/20 2016   ALKPHOS 79   ALT 14   AST 49*   ALBUMIN 1.9*   PROT 8.3   BILITOT 0.5      Recent Labs     04/22/20 1849 04/22/20 2016 04/23/20  0846   DDIMER  --   --  5.18*   FERRITIN >26,000*  --   --    CRP  --  270.0*  --    LDH  --  226  --    TROPONINI 0.439*  --   --    LACTATE 1.6  --   --        All labs within the last 24 hours were reviewed.     Microbiology:  Lab Results   Component Value Date    [id number] Positive (A) 04/22/2020       Microbiology Results (last 7 days)     Procedure Component Value Units Date/Time    Blood culture [[id number]]     Order Status:  Sent Specimen:  Blood     Blood culture [[id number]]     Order Status:  Sent Specimen:  Blood     Blood culture x two cultures. Draw prior to antibiotics. [[id number]] Collected:  04/22/20 1919    Order Status:  Completed Specimen:  Blood from Peripheral, Forearm, Left Updated:  04/24/20 0100     Blood Culture, Routine Gram stain miya bottle: Gram positive cocci in clusters resembling Staph       Results called to and read back by:Marco Lucero RN 04/24/2020  01:00    Narrative:       Aerobic and anaerobic    Blood culture x two cultures. Draw prior to antibiotics. [[id number]] Collected:  04/22/20 1849    Order Status:  Completed Specimen:  Blood from Peripheral, Antecubital, Left Updated:  04/23/20 2212     Blood Culture, Routine No Growth to date      No Growth to date    Narrative:       Aerobic and anaerobic    Culture, Respiratory with Gram Stain [[id number]]     Order Status:  No result Specimen:  Sputum, Expectorated             Imaging  ECG Results          EKG 12-lead (Edited Result - FINAL)  Result time 04/23/20 12:54:59    Edited Result - FINAL by Interface, Lab In [facility] (04/23/20 12:54:59)                  Narrative:    Test Reason : R06.00,    Vent. Rate : 113 BPM     Atrial Rate : 113 BPM     P-R Int : 132 ms          QRS Dur : 094 ms      QT Int : 342 ms       P-R-T Axes : 082 067 083 degrees     QTc Int : 469 ms    Age and gender specific analysis  Sinus tachycardia  Incomplete right bundle branch block  Nonspecific T wave abnormality  Cannot exclude Lateral infarct  Abnormal ECG  When compared with ECG of 28-MAR-2020 13:40,  Left anterior fascicular block is no longer Present  lateral infarct is now present  Reconfirmed by KYLER VAERY MD ([id number]) on 4/23/2020 12:54:46 PM    Referred By: KHARI   SELF           Confirmed By:KYLER AVERY MD                              No results found for this or any previous visit.    X-Ray Chest AP Portable  Narrative: EXAMINATION:  XR CHEST AP PORTABLE    CLINICAL HISTORY:  dyspnea;    TECHNIQUE:  Single frontal view of the chest was performed.    COMPARISON:  Chest radiograph 03/28/2020    FINDINGS:  Monitoring leads and tubing overlie the chest.  Patient is rotated.  Cardiomediastinal silhouette is midline and similar to prior.    Interval improved aeration of both lungs with residual mild patchy opacities at the left greater than right lung bases from 03/28/2020 exam.  No definite new focal opacity, pleural effusion or pneumothorax.    Osseous structures appear stable without acute process seen.  Right subclavian vascular stent unchanged.  PA and lateral views can be obtained.  Impression: As above.    Electronically signed by: Vishal Laws MD  Date:    04/22/2020  Time:    19:07      All imaging within the last 24 hours was reviewed.       Assessment and Plan:    Active Hospital Problems    Diagnosis  POA    *COVID-19 virus infection [U07.1]  Yes    ESRD on dialysis [N18.6, Z99.2]  Not Applicable    Pressure injury due to medical device [T85.898A, L89.90]  Yes    Seizure [R56.9]  Yes    Chronic blood loss anemia [D50.0]  Yes     Hemodialysis-associated hypotension [I95.3]  Yes    Severe malnutrition [E43]  Yes     Assessment and Plan  Severe Malnutrition in the context of Social/Environmental Circumstances     Related to (etiology):  Unknown etiology     Signs and Symptoms (as evidenced by):  Energy Intake: per pt, <75% intake over the last year  Body Fat Depletion: overall subcutaneous fat loss, moderate triceps fat loss and protruding patellas.  Muscle Mass Depletion:  moderate muscle wasting of interosseous, temporal, clavicle, calves and quadriceps  Weight Loss: 24% (39 lbs) x 1 year    Recommendations     Recommendation/Intervention: 1. Should pt be cleared for po diet, recommend renal diet with texture per SLP along with Novasource ONS TID. 2. Should pt require enteral feeding, initiate Novasource renal formula at 10ml/hr and advance 10ml Q4hrs to goal rate of 40ml/hr (provides 1920kcal, 87g pro, 691ml free water). Provide additional 500ml water flush/24 hrs. Hold for residuals >500ml.  Goals: 1. Pt to receive nutrition < 48 hrs.      Erosive gastritis [K29.60]  Yes    Chronic upper GI bleeding [K92.2]  Yes    Chronic kidney disease-mineral and bone disorder [N18.9, E83.9, M89.9]  Yes     Chronic    Chronic diastolic heart failure [I50.32]  Yes     Chronic     2-23-17    1 - Low normal to mildly depressed left ventricular systolic function (EF 50-55%).     2 - Right ventricular enlargement with mildly depressed systolic function.     3 - Left ventricular diastolic dysfunction.     4 - Right atrial enlargement.     5 - Severe tricuspid regurgitation.     6 - Pulmonary hypertension. The estimated PA systolic pressure is 86 mmHg.     7 - Increased central venous pressure.       History of Intracerebral Hemorrhage: L BG 5/2013; R BG 9/2016; R BG 11/2016; L caudate head 2/2017 [Z86.79]  Not Applicable     Chronic    Acute respiratory failure with hypoxia [J96.01]  Yes    Anemia in chronic kidney disease [N18.9, D63.1]  Yes      Chronic      Resolved Hospital Problems   No resolved problems to display.       Covid-19 Virus Infection  - COVID-19 testing: Positive on 3/25 and again on 4/22  - Isolation: Airborne/Droplet. Surgical mask on patient. Notify Infection Control  - Diagnostics: Trend Q48hrs if stable, more frequently if patient decompensating.  Lymphopenia, hyponatremia, hyperferritinemia, elevated troponin, elevated d-dimer, age, and comorbidities are significant predictors of poor clinical outcome)  o CBC:  WBC 15  o CMP:  ESRD on HD  o Ferritin:    o D-dimer:    o CRP:  270  o BNP:    o CPK:  957  o LDH:  226  o Troponin:  0.439  o Procalcitonin:  5.39  o CXR:  Improving consolidations  o Blood culture x2 4/22/2020 NGTD  o Sputum culture 4/22/2020 NGTD    - Management: Per [facility] COVID Treatment Protocol (4/15/20)    - Monitoring:   - Telemetry & Continuous Pulse Oximetry    - Nutrition:    - Multivitamin PO daily   - Add Boost supplement   - Vitamin D 1000IU daily if deficient   - Ascorbic acid 500mg PO bid    - Supportive Care:   - acetaminophen 650mg PO Q6hr PRN fever/headache   - loperamide PRN viral diarrhea   - IVF if indicated, restrictive strategy preferred, no maintenance IV if able   - VTE PPx: enoxaparin or heparin SQ unless contraindicated    - Antibiotics:  - if indications, CXR findings, elevated procal. See protocol for alternatives.   - Discontinue early if low concern for bacterial co-infection   - Vanc/Zosyn given COVID and recent hospitalization    Acute Hypoxemic Respiratory Failure  Acute Respiratory Disease 2/2 COVID19  - Order RT consult via Respiratory Communication for COVID Protocols  - Order Incentive Spirometer Q4h  - Order Flutter Valve Q4h  - Continuous Pulse Oximetry  - Goal SpO2 92-96%  - Supplemental O2 via LFNC, VentiMask, or HFNC (see Respiratory Support Oxygen Therapies)  - If wheezing, albuterol INH Q6h scheduled & PRN  - Proning Protocol if patient is a candidate (see  Proning Protocol)   -  GCS >13, able to self-prone  - If deterioration, may warrant trial of NIPPV and transfer to [facility] pressure room or immediate ICU consult  - CXR with multifocal opacities  - Satting 100% on 2L NC  - Continue Vanc/Zosyn given recent hospitalization    Chronic Upper GIB  Erosive Gastritis  · Reports of recurrent and chronic GIB  · Last EGD 2/14/20 with non-bleeding erosive gastropathy  · Continue PPI BID  · GI consult given severe drop in anemia    Chronic Blood Loss Anemia  Anemia in CKD  · Hemoglobin 7.2 on admit with tachycardia  · Was 9 2 weeks ago at [place]  · Type and screen ordered  · Transfuse 1 unit PRBCs  · Repeat Hg this AM of 8.9    History of ICH  · 2013  · Resides at [facility]    Chronic Diastolic Heart Failure  · Chronic and stable  · Not on BP medications    HD Associated Hypotension  · Chronic issue  · Continue Midodrine with HD MWF    CKD Mineral and Bone Disorder  ESRD on HD  · Chronic and stable  · Continue Renvela TID    Seizure Disorder  · Chronic and stable  · Continue Keppra 250mg BID  · Seizure precautions    Sacral Pressure Injury  · Wound Care consult    Patient's chronic/stable medical conditions noted in the assessment above will be managed with the patient's home medications as tolerated.      Diet:  Renal pleasure feeds, TF  VTE PPx:  Heparin BID  Goals of Care:  Full code based on paperwork from NH.  Was DNR on previous admission.      Vania Calzada

## 2020-04-24 NOTE — PLAN OF CARE
Problem: Wound  Goal: Optimal Wound Healing  Outcome: Ongoing, Progressing     Problem: Fall Injury Risk  Goal: Absence of Fall and Fall-Related Injury  Outcome: Ongoing, Progressing     Problem: Adult Inpatient Plan of Care  Goal: Plan of Care Review  Outcome: Ongoing, Progressing  Goal: Patient-Specific Goal (Individualization)  Outcome: Ongoing, Progressing  Goal: Absence of Hospital-Acquired Illness or Injury  Outcome: Ongoing, Progressing  Goal: Optimal Comfort and Wellbeing  Outcome: Ongoing, Progressing  Goal: Readiness for Transition of Care  Outcome: Ongoing, Progressing  Goal: Rounds/Family Conference  Outcome: Ongoing, Progressing     Problem: Diabetes Comorbidity  Goal: Blood Glucose Level Within Desired Range  Outcome: Ongoing, Progressing     Problem: Skin Injury Risk Increased  Goal: Skin Health and Integrity  Outcome: Ongoing, Progressing     Problem: Device-Related Complication Risk (Hemodialysis)  Goal: Safe, Effective Therapy Delivery  Outcome: Ongoing, Progressing     Problem: Hemodynamic Instability (Hemodialysis)  Goal: Vital Signs Remain in Desired Range  Outcome: Ongoing, Progressing     Problem: Infection (Hemodialysis)  Goal: Absence of Infection Signs/Symptoms  Outcome: Ongoing, Progressing     Problem: Malnutrition  Goal: Improved Nutritional Intake  Outcome: Ongoing, Progressing     Problem: Coping Ineffective  Goal: Effective Coping  Outcome: Ongoing, Progressing     Disoriented x4. VS stable. No PRN medications administered. Pt transported off unit for CT scan. Will continue to monitor upon arrival back to unit.

## 2020-04-24 NOTE — PROGRESS NOTES
[facility]  Adult Nutrition  Progress Note    SUMMARY       Recommendations    Pt meets ASPEN criteria for chronic, severe protein-calorie malnutrition.     1.) Suggest SLP swallow evaluation for diet texture. ADAT to renal, high protein diet.   2.) If enteral nutrition to begin: suggest Novasource Renal @ 35mL/hr to provide 1680 kcals, 76gm protein and 602mL of free water.    MD to manage fluid.    If GRV >500mL, hold TF q4h then restart regimen.    TF to meet 107% EEN and 113% EPN.   3.) If bolus preferred, bolus 210mL q6h to meet nutritional needs.   4.) Suggest ascorbic acid, vitamin D, MVI, and zinc due to COVID19.   5.) Daily weights    Goals: 1.) Pt to consume/tolerate >75% EEN and EPN by follow up.   Nutrition Goal Status: new  Communication of RD Recs: other (comment)(POC)  Reason for Assessment    Reason For Assessment: new tube feeding  Diagnosis: infection/sepsis(COVID19+)  Relevant Medical History: HTN, CHF, aspiration pneumonia with PEG, ESRD on HD, GERD, DVT, L BKA  Interdisciplinary Rounds: did not attend  General Information Comments: Remote access: Attempted to contact pt with no avail. Per chart, pt bedbound, poor PO intake, +PEG in place. TF ordered 4/22 and not infusing at this time. Puree diet ordered 4/23 with <25% consumed. Predict poor intake PTA with wt loss. # (1/2020) reflecting 22% wt loss; clinically severe. No GI distress at this time. Noted L BKA est 2018. At this time, pt meets ASPEN criteria for severe malnutrition. NFPE not performed, patient has been screened for possible COVID-19 and has been placed on airborne and contact precautions. Patient is noted as being positive for COVID-19.    Nutrition Discharge Planning: Unable to determine at this time.     Nutrition Risk Screen    Nutrition Risk Screen: tube feeding or parenteral nutrition    Nutrition/Diet History    Food Allergies: NKFA  Factors Affecting Nutritional Intake: chewing  "difficulties/inability to chew food, difficulty/impaired swallowing    Anthropometrics    Temp: 97.7 °F (36.5 °C)  Height Method: Stated  Height: 5' 6" (167.6 cm)  Height (inches): 66 in  Weight Method: Bed Scale  Weight: 44.9 kg (98 lb 15.8 oz)  Weight (lb): 98.99 lb  Ideal Body Weight (IBW), Male: 142 lb  % Ideal Body Weight, Male (lb): 69.71 %  BMI (Calculated): 16  BMI Grade: less than 16 protein-energy malnutrition grade III  Weight Loss: unintentional  Usual Body Weight (UBW), k.8 kg(2020)  Weight Change Amount: 26 lb 3.8 oz  % Usual Body Weight: 79.21  % Weight Change From Usual Weight: -20.95 %  Amputation %: 5.9  Total Amputation %: 5.9  Amputation Ideal Body Weight (IBW), Male (lb): 136.1 lb  Amputee BMI (kg/m2): 17.03 kg/m2       Lab/Procedures/Meds    Pertinent Labs Reviewed: reviewed  Pertinent Labs Comments: BUN 50, Cr 6.2, GFR 10.7, Glu 115, albumin 1.9, AST 49,   Pertinent Medications Reviewed: reviewed  Pertinent Medications Comments: reglan, protonix, zosyn, vancomycin      Estimated/Assessed Needs    Weight Used For Calorie Calculations: 44.9 kg (98 lb 15.8 oz)  Energy Calorie Requirements (kcal): 4845-5551  Energy Need Method: Kcal/kg(30-35 )  Protein Requirements: 54-67(g/day)  Weight Used For Protein Calculations: 44.9 kg (98 lb 15.8 oz)(1.2-1.5 g/kg)     Estimated Fluid Requirement Method: other (see comments)(urine output + 1000mL)  RDA Method (mL): 1347         Nutrition Prescription Ordered    Current Diet Order: puree  Nutrition Order Comments: TF not infusing at this time.   Current Nutrition Support Formula Ordered: Novasource Renal  Current Nutrition Support Rate Ordered: 60 (ml)  Current Nutrition Support Frequency Ordered: ml/hr    Evaluation of Received Nutrient/Fluid Intake    I/O: -0.6L since admit  Comments: LBM 4/23  % Intake of Estimated Energy Needs: 0 - 25 %  % Meal Intake: 0 - 25 %    Nutrition Risk    Level of Risk/Frequency of Follow-up: low(x1/week) "     Assessment and Plan  Nutrition Problem:  Severe Protein-Calorie Malnutrition  Malnutrition in the context of Chronic Illness/Injury    Related to (etiology):  Varied intake; TF schedule and intake    Signs and Symptoms (as evidenced by)  Energy Intake: <50% of estimated energy requirement for >1 month  Weight Loss: 22% x 4 months  Predicted fat & muscle loss    Interventions(treatment strategy):  Collaboration of care with other providers  Modified diet-puree  Enteral nutrition-Novasource Renal @ 35mL/hr; or 210mL bolus q6h    Nutrition Diagnosis Status:  New       Monitor and Evaluation    Food and Nutrient Intake: energy intake, food and beverage intake, enteral nutrition intake  Food and Nutrient Adminstration: diet order, enteral and parenteral nutrition administration  Anthropometric Measurements: weight, weight change, body mass index  Biochemical Data, Medical Tests and Procedures: electrolyte and renal panel, gastrointestinal profile, glucose/endocrine profile, inflammatory profile  Nutrition-Focused Physical Findings: overall appearance     Malnutrition Assessment  NFPE not performed, patient has been screened for possible COVID-19 and has been placed on airborne and contact precautions. Patient is noted as being positive for COVID-19.    Nutrition Follow-Up    RD Follow-up?: Yes

## 2020-04-24 NOTE — CONSULTS
Palliative Care Acknowledgement of Consult - .date    Consult received. Palliative Care Provider:_MARY Bragg, APRN, ACNS-BC_____ will touch base with team prior to seeing patient. Full consult to follow.    Thank you for allowing us to be a part of the care of this patient.          Concepcion Russo LCSW, ACHP-SW

## 2020-04-25 PROBLEM — I26.99 PULMONARY EMBOLISM: Status: ACTIVE | Noted: 2020-01-01

## 2020-04-25 NOTE — PLAN OF CARE
CTA reviewed overnight. Small PE noted. Continue anticoagulation. Very high bleeding risk in setting or recurrent UGI bleeding and hx ICH (non-traumatic).  Will discuss with family before transition to longer acting agent.     Needs repeat blood cultures    Vania Calzada

## 2020-04-25 NOTE — ASSESSMENT & PLAN NOTE
- Plans for HD today for volume management and metabolic clearance at bedside due to COVID-19 precautions/ restrictions.   - Meds to renal parameters  - CBC, renal function panel with labs  - On Sevelamer     Hgb 8.0  -ferritin >12502 4/22/20  -consider Epogen     On dysphagia diet  -on Novasource renal with TF    BP -97/64  -on midodrine PRN    [facility] [place]  Dr. Lemus  TTS  3:45  AVF  DW 52.5kg

## 2020-04-25 NOTE — PROGRESS NOTES
[facility]  Nephrology  Progress Note    Patient Name: Vaughn Retana  MRN: [id number]  Admission Date: 4/22/2020  Hospital Length of Stay: 3 days  Attending Provider: Vania Calzada MD   Primary Care Physician: Cori Randall MD  Principal Problem:COVID-19 virus infection    Subjective:     HPI: 53 y/o male with PMHx ESRD on iHD (TTS, last HD T 4/21/20), hospitalization for COVID-19 (3/28/20-4/15/20), HF, GIB requiring blood transfusion, PEG status, seizures, and latent TB presented from NH for evaluation of shortness of breath; admitted with COVID-19 infection    Interval History:   Plans for HD today at bedside due to COVID-19 precautions. Electrolytes and acid base stable on AM labs.   CTA overnight revealing a small PE per notes, on anticoagulation.     A full-contact physical exam was not possible due to the clinical condition of the patient due to Covid 19 infection and the necessity to minimize exposure. I thoroughly reviewed the notes, demographics, clinical, laboratorial and imaging information available in medical records.     Review of symptoms and physical exam of the primary team reviewed.      Review of patient's allergies indicates:   Allergen Reactions    Fosrenol [lanthanum] Nausea And Vomiting     Nausea and vomiting     Current Facility-Administered Medications   Medication Frequency    0.9%  NaCl infusion (for blood administration) Q24H PRN    0.9%  NaCl infusion PRN    0.9%  NaCl infusion Once    0.9%  NaCl infusion PRN    0.9%  NaCl infusion Once    acetaminophen tablet 650 mg Q4H PRN    albuterol inhaler 2 puff Q6H    dextromethorphan-guaifenesin  mg/5 ml liquid 10 mL Q4H PRN    dextrose 10% (D10W) Bolus PRN    dextrose 10% (D10W) Bolus PRN    glucagon (human recombinant) injection 1 mg PRN    glucose chewable tablet 16 g PRN    glucose chewable tablet 24 g PRN    heparin 25,000 units in dextrose 5% 250 mL (100 units/mL) infusion LOW INTENSITY  nomogram - [facility] Continuous    levetiracetam oral soln Soln 250 mg BID    loperamide capsule 2 mg Q6H PRN    melatonin tablet 6 mg Nightly PRN    metoclopramide HCl tablet 10 mg QID    midodrine tablet 5 mg PRN    ondansetron injection 4 mg Q8H PRN    pantoprazole EC tablet 40 mg BID    piperacillin-tazobactam 4.5 g in sodium chloride 0.9% 100 mL IVPB (ready to mix system) Q12H    sevelamer carbonate tablet 800 mg TID WM    sodium chloride 0.9% flush 10 mL Q8H PRN    vancomycin - pharmacy to dose pharmacy to manage frequency       Objective:     Vital Signs (Most Recent):  Temp: 99.1 °F (37.3 °C) (04/25/20 0816)  Pulse: 109 (04/25/20 0816)  Resp: 20 (04/25/20 0816)  BP: 97/64 (04/25/20 0816)  SpO2: 99 % (04/25/20 0816)  O2 Device (Oxygen Therapy): nasal cannula (04/25/20 0816) Vital Signs (24h Range):  Temp:  [98.4 °F (36.9 °C)-100.2 °F (37.9 °C)] 99.1 °F (37.3 °C)  Pulse:  [108-136] 109  Resp:  [18-30] 20  SpO2:  [94 %-99 %] 99 %  BP: ()/(63-83) 97/64     Weight: 44.9 kg (98 lb 15.8 oz) (04/24/20 0700)  Body mass index is 15.98 kg/m².  Body surface area is 1.45 meters squared.    I/O last 3 completed shifts:  In: 1500 [I.V.:500; Other:800; IV Piggyback:200]  Out: 2500 [Other:2500]    Physical Exam   Constitutional:   A full-contact physical exam was not possible due to the clinical condition of the patient due to covid 19 infection and the necessity to minimize exposure.     Physical exam of the primary team reviewed.     Nursing note and vitals reviewed.      Significant Labs:  CBC:   Recent Labs   Lab 04/25/20  0608   WBC 9.41   RBC 2.82*   HGB 8.0*   HCT 26.7*      MCV 95   MCH 28.4   MCHC 30.0*     CMP:   Recent Labs   Lab 04/25/20  0608   *   CALCIUM 9.2   ALBUMIN 1.6*   PROT 8.2      K 4.1   CO2 21*      BUN 49*   CREATININE 5.2*   ALKPHOS 81   ALT 15   AST 38   BILITOT 0.3     All labs within the past 24 hours have been reviewed.       Assessment/Plan:     * COVID-19  virus infection  - Managed by Primary Team    ESRD on dialysis  - Plans for HD today for volume management and metabolic clearance at bedside due to COVID-19 precautions/ restrictions.   - Meds to renal parameters  - CBC, renal function panel with labs  - On Sevelamer     Hgb 8.0  -ferritin >02508 4/22/20  -consider Epogen     On dysphagia diet  -on Novasource renal with TF    BP -97/64  -on midodrine PRN    [facility]  Dr. Lemus  TTS  3:45  AVF  DW 52.5kg    Chronic upper GI bleeding  - GI following         Thank you for your consult. I will follow-up with patient. Please contact us if you have any additional questions.    Glenis Benavidez DNP, FNP-C  Nephrology  [facility]

## 2020-04-25 NOTE — PROGRESS NOTES
Hospital Medicine  History and Physical  [facility]      Patient Name: Vaughn Retana  MRN:  [id number]  Hospital Medicine Team: [facility] [place] Vania Calzada MD  Date of Admission:  4/22/2020     Length of Stay:  LOS: 3 days     Principal Problem: Suspected Covid-19 Virus Infection       History of Present Illness:    Mr. Vaughn Retana is a 54 y.o. male with hypertension, intracranial bleed, end-stage renal on dialysis MWF,L BKA 2/2 osteomyelitis, history of DVT, history of heart failure including pulmonary hypertension with normal EF (last 2017), hx upper GI bleeds, PEG status, seizures, latent TB presented from [facility] for evaluation of shortness of breath.  Patient isn't able to speak, so history is obtained via yes/no questions and ER documentation.  He denies any SOB, cough, fever, or chills and says he feels ok with no pain.  Of note, he just had an extensive hospitalization at [facility] [place]4/15 for COVID.  At the end of his hospital course, he was transfused blood and decided to maintain a conservative approach so GI wasn't involved.  Additionally, he was DNR during the last hospital stay.    Upon arrival to the ER, vitals were temp 98.9F with HR 110s and satting 100% on 2L NC.  CXR showed improved consolidations from his recent hospitalization.  He was given Vanc and Zosyn and was admitted to Hospital Medicine for further management.    Interval History:   Abx administered pending cultures. Concern for G+ cocci in blood sample--on vancomycin redosed this AM following HD.  Increase in inflammatory markers noted. Hg low on admission--GI has evaluated, no plan for elective procedure at this time. D-dimer at 6-- concern for GI bleeding risk. Concern for another infectious process than covid at this time.       Review of Systems:  Unable to obtain 2/2 aphasia      Past Medical History: Patient has a past medical history of Amputation stump pain (9/10/2013),  Aspiration pneumonia (7/27/2015), Asterixis (11/8/2016), C. difficile colitis (8/7/2015), Cholelithiasis without obstruction (8/25/2015), Chronic diastolic heart failure, Chronic low back pain (12/1/2015), Closed head injury (9/8/2016), DVT (deep venous thrombosis) (7/28/2017), ESRD on hemodialysis (2/7/2013), GERD (gastroesophageal reflux disease), HCV antibody positive, Hemiparesis affecting left side as late effect of stroke (11/08/2016), History of Intracerebral Hemorrhage: L BG 5/2013; R BG 9/2016; R BG 11/2016; L caudate head 2/2017 (11/2/2016), Hypertension, left basal ganglia ICH 5/2013 (11/2/2016), Left Caudate Head ICH 2/22/2017 (2/24/2017), 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      Past Surgical History: Patient has a past surgical history that includes R AVF (9/12/12); Leg amputation through knee (12/18/2013); Foot amputation through metatarsal; Colonoscopy; Colonoscopy (N/A, 4/4/2017); Esophagogastroduodenoscopy (N/A, 6/12/2018); Upper gastrointestinal endoscopy; Esophagogastroduodenoscopy (N/A, 3/7/2019); Esophagogastroduodenoscopy (N/A, 9/23/2019); Esophagogastroduodenoscopy (N/A, 10/2/2019); Esophagogastroduodenoscopy (N/A, 11/12/2019); and Esophagogastroduodenoscopy (N/A, 2/14/2020).      Social History: Patient reports that he has quit smoking. He has a 10.00 pack-year smoking history. He has never used smokeless tobacco. He reports that he does not drink alcohol or use drugs.      Family History: Patient's family history includes Alcohol abuse in his maternal grandmother; Diabetes in his brother and maternal grandfather; Early death in his mother;  Heart disease in his father; Hyperlipidemia in his father; Hypertension in his father and sister; Kidney disease in his father.      Medications: Scheduled Meds:   sodium chloride 0.9%   Intravenous Once    sodium chloride 0.9%   Intravenous Once    albuterol  2 puff Inhalation Q6H    levetiracetam oral soln  250 mg Oral BID    metoclopramide HCl  10 mg Oral QID    pantoprazole  40 mg Oral BID    piperacillin-tazobactam (ZOSYN) IVPB  4.5 g Intravenous Q12H    sevelamer carbonate  800 mg Oral TID WM     Continuous Infusions:   heparin (porcine) in D5W 15 Units/kg/hr (04/25/20 0723)     PRN Meds:.sodium chloride, sodium chloride 0.9%, sodium chloride 0.9%, acetaminophen, dextromethorphan-guaifenesin  mg/5 ml, Dextrose 10% Bolus, Dextrose 10% Bolus, glucagon (human recombinant), glucose, glucose, loperamide, melatonin, midodrine, ondansetron, sodium chloride 0.9%, Pharmacy to dose Vancomycin consult **AND** vancomycin - pharmacy to dose      Allergies: Patient is allergic to fosrenol [lanthanum].      Physical Exam:    Temp:  [98.4 °F (36.9 °C)-100.2 °F (37.9 °C)]   Pulse:  [104-126]   Resp:  [16-24]   BP: ()/(63-79)   SpO2:  [94 %-99 %]     Constitutional: Appears sickly  Head: Normocephalic and atraumatic.   Eyes: EOM are normal. Pupils are equal, round, and reactive to light. No scleral icterus.   Neck: Normal range of motion. Neck supple.   Cardiovascular: Normal heart rate.  Regular heart rhythm.  No murmur heard.  Pulmonary/Chest: Comfortable on 2L NC.  Coarse breath sounds throughout  Abdominal: Soft. Bowel sounds are normal.  No distension.  No tenderness  Musculoskeletal: Left BKA.  Right leg contractures  Neurological: Alert and making eye contact.  Not answering questions  Skin: Skin is warm and dry.   Psychiatric: Normal mood and affect. Behavior is normal.       Laboratory:  Recent Labs   Lab 04/22/20  1849 04/23/20  1151 04/25/20  0608   WBC 15.60* 15.73* 9.41   LYMPH 7.9*  1.2 6.9*   1.1 16.4*  1.5   HGB 7.2* 8.9* 8.0*   HCT 23.5* 27.4* 26.7*    201 209     Recent Labs   Lab 04/22/20 2016 04/23/20  1145 04/25/20  0608     --  136   K 4.3  --  4.1   CL 99  --  101   CO2 23  --  21*   BUN 50*  --  49*   CREATININE 6.2*  --  5.2*   *  --  219*   CALCIUM 9.2  --  9.2   PHOS  --  5.8*  --      Recent Labs   Lab 04/22/20 2016 04/25/20  0608   ALKPHOS 79 81   ALT 14 15   AST 49* 38   ALBUMIN 1.9* 1.6*   PROT 8.3 8.2   BILITOT 0.5 0.3      Recent Labs     04/22/20  1849  04/22/20 2016 04/25/20  0608 04/25/20  0838   DDIMER  --   --   --    < > 6.18*  --    FERRITIN >26,000*  --   --   --   --   --    CRP  --    < > 270.0*  --   --  202.3*   LDH  --   --  226  --   --   --    TROPONINI 0.439*  --   --   --   --   --    LACTATE 1.6  --   --   --   --   --     < > = values in this interval not displayed.       All labs within the last 24 hours were reviewed.     Microbiology:  Lab Results   Component Value Date    [id number] Positive (A) 04/22/2020       Microbiology Results (last 7 days)     Procedure Component Value Units Date/Time    Blood culture x two cultures. Draw prior to antibiotics. [[id number]]  (Abnormal) Collected:  04/22/20 1919    Order Status:  Completed Specimen:  Blood from Peripheral, Forearm, Left Updated:  04/25/20 1111     Blood Culture, Routine Gram stain miya bottle: Gram positive cocci in clusters resembling Staph       Results called to and read back by:Marco Lucero RN 04/24/2020  01:00      COAGULASE-NEGATIVE STAPHYLOCOCCUS SPECIES  Organism is a probable contaminant      Narrative:       Aerobic and anaerobic    Blood culture [[id number]] Collected:  04/25/20 0804    Order Status:  Sent Specimen:  Blood Updated:  04/25/20 0838    Blood culture x two cultures. Draw prior to antibiotics. [[id number]] Collected:  04/22/20 3769    Order Status:  Completed Specimen:  Blood from Peripheral, Antecubital, Left Updated:  04/24/20 4237     Blood Culture,  Routine No Growth to date      No Growth to date      No Growth to date    Narrative:       Aerobic and anaerobic    Blood culture [[id number]]     Order Status:  Sent Specimen:  Blood     Blood culture [[id number]]     Order Status:  Sent Specimen:  Blood     Culture, Respiratory with Gram Stain [[id number]]     Order Status:  No result Specimen:  Sputum, Expectorated             Imaging  ECG Results          EKG 12-lead (Edited Result - FINAL)  Result time 04/23/20 12:54:59    Edited Result - FINAL by Interface, Lab In [facility] (04/23/20 12:54:59)                 Narrative:    Test Reason : R06.00,    Vent. Rate : 113 BPM     Atrial Rate : 113 BPM     P-R Int : 132 ms          QRS Dur : 094 ms      QT Int : 342 ms       P-R-T Axes : 082 067 083 degrees     QTc Int : 469 ms    Age and gender specific analysis  Sinus tachycardia  Incomplete right bundle branch block  Nonspecific T wave abnormality  Cannot exclude Lateral infarct  Abnormal ECG  When compared with ECG of 28-MAR-2020 13:40,  Left anterior fascicular block is no longer Present  lateral infarct is now present  Reconfirmed by KYLER AVERY MD ([id number]) on 4/23/2020 12:54:46 PM    Referred By: [email or web address]   SELF           Confirmed By:KYLER AVERY MD                              No results found for this or any previous visit.    Echo Color Flow Doppler? Yes; Bubble Contrast? No  · Normal left ventricular systolic function. The estimated ejection   fraction is 60%.  · Concentric left ventricular remodeling.  · Normal LV diastolic function.  · No wall motion abnormalities.  · Normal right ventricular systolic function.  · Severe left atrial enlargement.  · Mild tricuspid regurgitation.  · The estimated PA systolic pressure is 34 mmHg.  · Normal central venous pressure (3 mmHg).         All imaging within the last 24 hours was reviewed.       Assessment and Plan:    Active Hospital Problems    Diagnosis  POA    *COVID-19 virus infection [U07.1]  Yes     Palliative care encounter [Z51.5]  Not Applicable    Advanced care planning/counseling discussion [Z71.89]  Not Applicable    Goals of care, counseling/discussion [Z71.89]  Not Applicable    ESRD on dialysis [N18.6, Z99.2]  Not Applicable    Pressure injury due to medical device [T85.898A, L89.90]  Yes    Seizure [R56.9]  Yes    Chronic blood loss anemia [D50.0]  Yes    Hemodialysis-associated hypotension [I95.3]  Yes    Severe malnutrition [E43]  Yes     Assessment and Plan  Severe Malnutrition in the context of Social/Environmental Circumstances     Related to (etiology):  Unknown etiology     Signs and Symptoms (as evidenced by):  Energy Intake: per pt, <75% intake over the last year  Body Fat Depletion: overall subcutaneous fat loss, moderate triceps fat loss and protruding patellas.  Muscle Mass Depletion:  moderate muscle wasting of interosseous, temporal, clavicle, calves and quadriceps  Weight Loss: 24% (39 lbs) x 1 year    Recommendations     Recommendation/Intervention: 1. Should pt be cleared for po diet, recommend renal diet with texture per SLP along with Novasource ONS TID. 2. Should pt require enteral feeding, initiate Novasource renal formula at 10ml/hr and advance 10ml Q4hrs to goal rate of 40ml/hr (provides 1920kcal, 87g pro, 691ml free water). Provide additional 500ml water flush/24 hrs. Hold for residuals >500ml.  Goals: 1. Pt to receive nutrition < 48 hrs.      Erosive gastritis [K29.60]  Yes    Chronic upper GI bleeding [K92.2]  Yes    Chronic kidney disease-mineral and bone disorder [N18.9, E83.9, M89.9]  Yes     Chronic    Chronic diastolic heart failure [I50.32]  Yes     Chronic     2-23-17    1 - Low normal to mildly depressed left ventricular systolic function (EF 50-55%).     2 - Right ventricular enlargement with mildly depressed systolic function.     3 - Left ventricular diastolic dysfunction.     4 - Right atrial enlargement.     5 - Severe tricuspid regurgitation.     6 -  Pulmonary hypertension. The estimated PA systolic pressure is 86 mmHg.     7 - Increased central venous pressure.       History of Intracerebral Hemorrhage: L BG 5/2013; R BG 9/2016; R BG 11/2016; L caudate head 2/2017 [Z86.79]  Not Applicable     Chronic    Acute respiratory failure with hypoxia [J96.01]  Yes    Anemia in chronic kidney disease [N18.9, D63.1]  Yes     Chronic      Resolved Hospital Problems   No resolved problems to display.       Covid-19 Virus Infection  - COVID-19 testing: Positive on 3/25 and again on 4/22  - Isolation: Airborne/Droplet. Surgical mask on patient. Notify Infection Control  - Diagnostics: Trend Q48hrs if stable, more frequently if patient decompensating.  Lymphopenia, hyponatremia, hyperferritinemia, elevated troponin, elevated d-dimer, age, and comorbidities are significant predictors of poor clinical outcome)  o CBC:  WBC 15  o CMP:  ESRD on HD  o Ferritin:    o D-dimer:    o CRP:  270  o BNP:    o CPK:  957  o LDH:  226  o Troponin:  0.439  o Procalcitonin:  5.39  o CXR:  Improving consolidations  o Blood culture x2 4/22/2020 NGTD  o Sputum culture 4/22/2020 NGTD    - [facility]    - Monitoring:   - Telemetry & Continuous Pulse Oximetry    - Nutrition:    - Multivitamin PO daily   - Add Boost supplement   - Vitamin D 1000IU daily if deficient   - Ascorbic acid 500mg PO bid    - Supportive Care:   - acetaminophen 650mg PO Q6hr PRN fever/headache   - loperamide PRN viral diarrhea   - IVF if indicated, restrictive strategy preferred, no maintenance IV if able   - VTE PPx: enoxaparin or heparin SQ unless contraindicated    - Antibiotics:  - if indications, CXR findings, elevated procal. See protocol for alternatives.   - Discontinue early if low concern for bacterial co-infection   - Vanc/Zosyn given COVID and recent hospitalization    Acute Hypoxemic Respiratory Failure  Acute Respiratory Disease 2/2 COVID19  - Order RT consult  via Respiratory Communication for COVID Protocols  - Order Incentive Spirometer Q4h  - Order Flutter Valve Q4h  - Continuous Pulse Oximetry  - Goal SpO2 92-96%  - Supplemental O2 via LFNC, VentiMask, or HFNC (see Respiratory Support Oxygen Therapies)  - If wheezing, albuterol INH Q6h scheduled & PRN  - Proning Protocol if patient is a candidate (see HM Proning Protocol)   - GCS >13, able to self-prone  - If deterioration, may warrant trial of NIPPV and transfer to [facility] pressure room or immediate ICU consult  - CXR with multifocal opacities  - Satting 100% on 2L NC  - Continue Vanc/Zosyn given recent hospitalization    Chronic Upper GIB  Erosive Gastritis  · Reports of recurrent and chronic GIB  · Last EGD 2/14/20 with non-bleeding erosive gastropathy  · Continue PPI BID  · GI consult given severe drop in anemia, no plans for EGD currently  · Hg     Chronic Blood Loss Anemia  Anemia in CKD  · Hemoglobin 7.2 on admit with tachycardia  · Was 9 2 weeks ago at DC  · Type and screen ordered  · Transfuse 1 unit PRBCs  · Repeat Hg this AM of 8 today    History of ICH  · 2013  · Resides at [facility]    Chronic Diastolic Heart Failure  · Chronic and stable  · Not on BP medications    HD Associated Hypotension  · Chronic issue  · Continue Midodrine with HD MWF    CKD Mineral and Bone Disorder  ESRD on HD  · Chronic and stable  · Continue Renvela TID    Seizure Disorder  · Chronic and stable  · Continue Keppra 250mg BID  · Seizure precautions    Sacral Pressure Injury  · Wound Care consult    Lower Extremity DVT  - on heparin gtt currently  - due to high bleeding risk hesistant to commit patient to long term oral anticoagulation especially as this may be a chronic thrombus.     Rt lower lung Pulmonary embolism  - treatment heparing gtt.   - extensive discussion with family concerning risk/benefit of anticoagulation in history of recurrent UGI bleeding. Rafaely ok with hepain gtt but want to discuss as family concerning  long term anticoagulation. As I cannot tell the age of the pulmonary embolism (know chronic LE DVT) it is difficult to tell if this was primary issue for shortness of breath as complicated mulitfactorial issues like probable PNA and Anemia on admission       Patient's chronic/stable medical conditions noted in the assessment above will be managed with the patient's home medications as tolerated.      Diet:  Renal pleasure feeds, TF  VTE PPx:  Heparin BID  Goals of Care:  Full code based on paperwork from NH.  Was DNR on previous admission.    4/25: sister updated on phone at 2:40pm  Vania Calzada

## 2020-04-25 NOTE — NURSING
Informed Dr. Calzada, pt aPPt 30.7, Dr. Calzada instructed SN not to bolus pt only increase heparin drip by 3 units/hr due to pt recently having a large GI bleed. SN verbalized understanding

## 2020-04-25 NOTE — PROGRESS NOTES
Pharmacokinetic Assessment Follow Up: IV Vancomycin    Vancomycin serum concentration assessment(s):    -Vancomycin random level was drawn appropriately and can be used to guide therapy.   -Level of 45.9 mcg/mL is above therapeutic range 15-20 mcg/mL for LRTI.  -Narrative: The administration of vancomycin IV 1,000 mg once (initial dose scheduled for 4/22) was not recorded in Epic.   -Per [facility] documents, patient got discharged recently and received vancomycin at [facility] (last vanc record was on 4/18).   -No doses on 4/23, no iHD.   - A high dose of 1250 mg IV (27 mg/kg) was given last night at 1838.   -ESRD patient on iHD, last on 4/24.     Vancomycin Regimen Plan:    -Hold vancomycin dose today.   -Monitor renal function closely and follow up for nephrology recommendations.  -Re-dose conservatively for levels < 20 mcg/mL.    Drug levels (last 3 results):  Recent Labs   Lab Result Units 04/23/20  1145 04/25/20  0608   Vancomycin, Random ug/mL 7.8 45.9       Pharmacy will continue to follow and monitor vancomycin.    Please contact pharmacy at extension [phone] for questions regarding this assessment.    Thank you for the consult,   Gopal Ramos       Patient brief summary:  Vaughn Retana is a 54 y.o. male initiated on antimicrobial therapy with IV Vancomycin for treatment of lower respiratory infection    Drug Allergies:   Review of patient's allergies indicates:   Allergen Reactions    Fosrenol [lanthanum] Nausea And Vomiting     Nausea and vomiting       Actual Body Weight:   44.9 kg    Renal Function:   Estimated Creatinine Clearance: 10.2 mL/min (A) (based on SCr of 5.2 mg/dL (H)).,     Dialysis Method (if applicable):  intermittent HD    CBC (last 72 hours):  Recent Labs   Lab Result Units 04/22/20  1849 04/23/20  1151 04/25/20  0608   WBC K/uL 15.60* 15.73* 9.41   Hemoglobin g/dL 7.2* 8.9* 8.0*   Hematocrit % 23.5* 27.4* 26.7*   Platelets K/uL 239 201 209   Gran% % 83.4* 85.3* 68.1   Lymph% % 7.9* 6.9* 16.4*    Mono% % 8.0 6.8 13.0   Eosinophil% % 0.1 0.3 1.5   Basophil% % 0.2 0.1 0.3   Differential Method  Automated Automated Automated       Metabolic Panel (last 72 hours):  Recent Labs   Lab Result Units 04/22/20 2016 04/23/20  1145 04/25/20  0608   Sodium mmol/L 136  --  136   Potassium mmol/L 4.3  --  4.1   Chloride mmol/L 99  --  101   CO2 mmol/L 23  --  21*   Glucose mg/dL 115*  --  219*   BUN, Bld mg/dL 50*  --  49*   Creatinine mg/dL 6.2*  --  5.2*   Albumin g/dL 1.9*  --  1.6*   Total Bilirubin mg/dL 0.5  --  0.3   Alkaline Phosphatase U/L 79  --  81   AST U/L 49*  --  38   ALT U/L 14  --  15   Phosphorus mg/dL  --  5.8*  --        Vancomycin Administrations:  vancomycin given in the last 96 hours                   vancomycin 1.25 g in dextrose 5% 250 mL IVPB (ready to mix) (mg) 1,250 mg New Bag 04/24/20 1838                Microbiologic Results:  Microbiology Results (last 7 days)     Procedure Component Value Units Date/Time    Blood culture [[id number]]     Order Status:  Sent Specimen:  Blood     Blood culture x two cultures. Draw prior to antibiotics. [[id number]] Collected:  04/22/20 1849    Order Status:  Completed Specimen:  Blood from Peripheral, Antecubital, Left Updated:  04/24/20 2212     Blood Culture, Routine No Growth to date      No Growth to date      No Growth to date    Narrative:       Aerobic and anaerobic    Blood culture [[id number]]     Order Status:  Sent Specimen:  Blood     Blood culture [[id number]]     Order Status:  Sent Specimen:  Blood     Blood culture x two cultures. Draw prior to antibiotics. [[id number]] Collected:  04/22/20 1919    Order Status:  Completed Specimen:  Blood from Peripheral, Forearm, Left Updated:  04/24/20 0100     Blood Culture, Routine Gram stain miya bottle: Gram positive cocci in clusters resembling Staph       Results called to and read back by:Marco Lucero RN 04/24/2020  01:00    Narrative:       Aerobic and anaerobic    Culture, Respiratory with Gram  Stain [[id number]]     Order Status:  No result Specimen:  Sputum, Expectorated

## 2020-04-25 NOTE — CARE UPDATE
Rapid Response Nurse Chart Check     Chart check completed, abnormal VS noted. Please to call [phone] for further concerns or assistance.

## 2020-04-26 NOTE — PROGRESS NOTES
Hospital Medicine  History and Physical  [facility]      Patient Name: Vaughn Retana  MRN:  [id number]  Hospital Medicine Team: [facility] HOSP MED K Vania Calzada MD  Date of Admission:  4/22/2020     Length of Stay:  LOS: 4 days     Principal Problem: Suspected Covid-19 Virus Infection       History of Present Illness:    Mr. Vaughn Retana is a 54 y.o. male with hypertension, intracranial bleed, end-stage renal on dialysis MWF,L BKA 2/2 osteomyelitis, history of DVT, history of heart failure including pulmonary hypertension with normal EF (last 2017), hx upper GI bleeds, PEG status, seizures, latent TB presented from [facility] for evaluation of shortness of breath.  Patient isn't able to speak, so history is obtained via yes/no questions and ER documentation.  He denies any SOB, cough, fever, or chills and says he feels ok with no pain.  Of note, he just had an extensive hospitalization at [facility] from 3/28-4/15 for COVID.  At the end of his hospital course, he was transfused blood and decided to maintain a conservative approach so GI wasn't involved.  Additionally, he was DNR during the last hospital stay.    Upon arrival to the ER, vitals were temp 98.9F with HR 110s and satting 100% on 2L NC.  CXR showed improved consolidations from his recent hospitalization.  He was given Vanc and Zosyn and was admitted to Hospital Medicine for further management.    Interval History:   Concern for G+ cocci in blood sample- but more likely contaminant as one bottle with cogulase negative staph. OK to stop vancomycin. Continue on zosyn for possible PNA. Hg low on admission--GI has evaluated, no plan for elective procedure at this time. D-dimer at 6-- concern for GI bleeding risk. Concern for another infectious process than covid at this time.       Review of Systems:  Unable to obtain 2/2 aphasia      Past Medical History: Patient has a past medical history of Amputation stump pain  (9/10/2013), Aspiration pneumonia (7/27/2015), Asterixis (11/8/2016), C. difficile colitis (8/7/2015), Cholelithiasis without obstruction (8/25/2015), Chronic diastolic heart failure, Chronic low back pain (12/1/2015), Closed head injury (9/8/2016), DVT (deep venous thrombosis) (7/28/2017), ESRD on hemodialysis (2/7/2013), GERD (gastroesophageal reflux disease), HCV antibody positive, Hemiparesis affecting left side as late effect of stroke (11/08/2016), History of Intracerebral Hemorrhage: L BG 5/2013; R BG 9/2016; R BG 11/2016; L caudate head 2/2017 (11/2/2016), Hypertension, left basal ganglia ICH 5/2013 (11/2/2016), Left Caudate Head ICH 2/22/2017 (2/24/2017), 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      Past Surgical History: Patient has a past surgical history that includes R AVF (9/12/12); Leg amputation through knee (12/18/2013); Foot amputation through metatarsal; Colonoscopy; Colonoscopy (N/A, 4/4/2017); Esophagogastroduodenoscopy (N/A, 6/12/2018); Upper gastrointestinal endoscopy; Esophagogastroduodenoscopy (N/A, 3/7/2019); Esophagogastroduodenoscopy (N/A, 9/23/2019); Esophagogastroduodenoscopy (N/A, 10/2/2019); Esophagogastroduodenoscopy (N/A, 11/12/2019); and Esophagogastroduodenoscopy (N/A, 2/14/2020).      Social History: Patient reports that he has quit smoking. He has a 10.00 pack-year smoking history. He has never used smokeless tobacco. He reports that he does not drink alcohol or use drugs.      Family History: Patient's family history includes Alcohol abuse in his maternal grandmother; Diabetes in his brother and maternal grandfather; Early death in  his mother; Heart disease in his father; Hyperlipidemia in his father; Hypertension in his father and sister; Kidney disease in his father.      Medications: Scheduled Meds:   sodium chloride 0.9%   Intravenous Once    sodium chloride 0.9%   Intravenous Once    albuterol  2 puff Inhalation Q6H    levetiracetam oral soln  250 mg Oral BID    metoclopramide HCl  10 mg Oral QID    pantoprazole  40 mg Oral BID    piperacillin-tazobactam (ZOSYN) IVPB  4.5 g Intravenous Q12H    sevelamer carbonate  800 mg Oral TID WM     Continuous Infusions:   heparin (porcine) in D5W 15 Units/kg/hr (04/25/20 0742)     PRN Meds:.sodium chloride, sodium chloride 0.9%, sodium chloride 0.9%, acetaminophen, dextromethorphan-guaifenesin  mg/5 ml, Dextrose 10% Bolus, Dextrose 10% Bolus, glucagon (human recombinant), glucose, glucose, loperamide, melatonin, midodrine, ondansetron, sodium chloride 0.9%, Pharmacy to dose Vancomycin consult **AND** vancomycin - pharmacy to dose      Allergies: Patient is allergic to fosrenol [lanthanum].      Physical Exam:    Temp:  [97.7 °F (36.5 °C)-99.5 °F (37.5 °C)]   Pulse:  []   Resp:  [11-24]   BP: (103-137)/(57-94)   SpO2:  [99 %-100 %]     Constitutional: Appears sickly  Head: Normocephalic and atraumatic.   Eyes: EOM are normal. Pupils are equal, round, and reactive to light. No scleral icterus.   Neck: Normal range of motion. Neck supple.   Cardiovascular: Normal heart rate.  Regular heart rhythm.  No murmur heard.  Pulmonary/Chest: Comfortable on 2L NC.  Coarse breath sounds throughout  Abdominal: Soft. Bowel sounds are normal.  No distension.  No tenderness  Musculoskeletal: Left BKA.  Right leg contractures  Neurological: Alert and making eye contact.  Not answering questions  Skin: Skin is warm and dry.   Psychiatric: Normal mood and affect. Behavior is normal.       Laboratory:  Recent Labs   Lab 04/23/20  1151 04/25/20  0608 04/26/20  0453   WBC 15.73* 9.41 7.64   LYMPH 6.9*   1.1 16.4*  1.5 11.8*  0.9*   HGB 8.9* 8.0* 8.2*   HCT 27.4* 26.7* 26.5*    209 228     Recent Labs   Lab 04/22/20 2016 04/23/20  1145 04/25/20  0608 04/26/20  0453     --  136 137   K 4.3  --  4.1 3.6   CL 99  --  101 100   CO2 23  --  21* 29   BUN 50*  --  49* 27*   CREATININE 6.2*  --  5.2* 2.5*   *  --  219* 137*   CALCIUM 9.2  --  9.2 9.0   MG  --   --   --  1.9   PHOS  --  5.8*  --  1.1*     Recent Labs   Lab 04/22/20 2016 04/25/20  0608 04/26/20  0453   ALKPHOS 79 81 85   ALT 14 15 18   AST 49* 38 43*   ALBUMIN 1.9* 1.6* 1.6*   PROT 8.3 8.2 8.7*   BILITOT 0.5 0.3 0.3      Recent Labs     04/25/20 0608 04/26/20  0453   DDIMER 6.18*  --   --    CRP  --    < > 145.6*    < > = values in this interval not displayed.       All labs within the last 24 hours were reviewed.     Microbiology:  Lab Results   Component Value Date    [id number] Positive (A) 04/22/2020       Microbiology Results (last 7 days)     Procedure Component Value Units Date/Time    Blood culture [[id number]] Collected:  04/25/20 0804    Order Status:  Completed Specimen:  Blood Updated:  04/26/20 1012     Blood Culture, Routine No Growth to date      No Growth to date    Narrative:       ADD-ON CRP #[id number] Per. mitch Calzada MD  04/25/2020  08:33     Blood culture x two cultures. Draw prior to antibiotics. [[id number]]  (Abnormal) Collected:  04/22/20 1919    Order Status:  Completed Specimen:  Blood from Peripheral, Forearm, Left Updated:  04/26/20 0956     Blood Culture, Routine Gram stain miya bottle: Gram positive cocci in clusters resembling Staph       Results called to and read back by:Marco Lucero RN 04/24/2020  01:00      COAGULASE-NEGATIVE STAPHYLOCOCCUS SPECIES  Organism is a probable contaminant      Narrative:       Aerobic and anaerobic    Blood culture x two cultures. Draw prior to antibiotics. [[id number]] Collected:  04/22/20 1849    Order Status:  Completed Specimen:  Blood from Peripheral,  Antecubital, Left Updated:  04/25/20 2212     Blood Culture, Routine No Growth to date      No Growth to date      No Growth to date      No Growth to date    Narrative:       Aerobic and anaerobic    Blood culture [[id number]]     Order Status:  Sent Specimen:  Blood     Blood culture [[id number]]     Order Status:  Sent Specimen:  Blood     Culture, Respiratory with Gram Stain [[id number]]     Order Status:  No result Specimen:  Sputum, Expectorated             Imaging  ECG Results          EKG 12-lead (Edited Result - FINAL)  Result time 04/23/20 12:54:59    Edited Result - FINAL by Interface, Lab In [facility] (04/23/20 12:54:59)                 Narrative:    Test Reason : R06.00,    Vent. Rate : 113 BPM     Atrial Rate : 113 BPM     P-R Int : 132 ms          QRS Dur : 094 ms      QT Int : 342 ms       P-R-T Axes : 082 067 083 degrees     QTc Int : 469 ms    Age and gender specific analysis  Sinus tachycardia  Incomplete right bundle branch block  Nonspecific T wave abnormality  Cannot exclude Lateral infarct  Abnormal ECG  When compared with ECG of 28-MAR-2020 13:40,  Left anterior fascicular block is no longer Present  lateral infarct is now present  Reconfirmed by KYELR AVERY MD ([id number]) on 4/23/2020 12:54:46 PM    Referred By: [email or web address]   SELF           Confirmed By:KYLER AVERY MD                              No results found for this or any previous visit.    Echo Color Flow Doppler? Yes; Bubble Contrast? No  · Normal left ventricular systolic function. The estimated ejection   fraction is 60%.  · Concentric left ventricular remodeling.  · Normal LV diastolic function.  · No wall motion abnormalities.  · Normal right ventricular systolic function.  · Severe left atrial enlargement.  · Mild tricuspid regurgitation.  · The estimated PA systolic pressure is 34 mmHg.  · Normal central venous pressure (3 mmHg).         All imaging within the last 24 hours was reviewed.       Assessment and Plan:    Active Hospital  Problems    Diagnosis  POA    *COVID-19 virus infection [U07.1]  Yes    Pulmonary embolism [I26.99]  Yes    Palliative care encounter [Z51.5]  Not Applicable    Advanced care planning/counseling discussion [Z71.89]  Not Applicable    Goals of care, counseling/discussion [Z71.89]  Not Applicable    ESRD on dialysis [N18.6, Z99.2]  Not Applicable    Pressure injury due to medical device [T85.898A, L89.90]  Yes    Seizure [R56.9]  Yes    Chronic blood loss anemia [D50.0]  Yes    Hemodialysis-associated hypotension [I95.3]  Yes    Severe malnutrition [E43]  Yes     Assessment and Plan  Severe Malnutrition in the context of Social/Environmental Circumstances     Related to (etiology):  Unknown etiology     Signs and Symptoms (as evidenced by):  Energy Intake: per pt, <75% intake over the last year  Body Fat Depletion: overall subcutaneous fat loss, moderate triceps fat loss and protruding patellas.  Muscle Mass Depletion:  moderate muscle wasting of interosseous, temporal, clavicle, calves and quadriceps  Weight Loss: 24% (39 lbs) x 1 year    Recommendations     Recommendation/Intervention: 1. Should pt be cleared for po diet, recommend renal diet with texture per SLP along with Novasource ONS TID. 2. Should pt require enteral feeding, initiate Novasource renal formula at 10ml/hr and advance 10ml Q4hrs to goal rate of 40ml/hr (provides 1920kcal, 87g pro, 691ml free water). Provide additional 500ml water flush/24 hrs. Hold for residuals >500ml.  Goals: 1. Pt to receive nutrition < 48 hrs.      Erosive gastritis [K29.60]  Yes    Chronic upper GI bleeding [K92.2]  Yes    Chronic kidney disease-mineral and bone disorder [N18.9, E83.9, M89.9]  Yes     Chronic    Chronic diastolic heart failure [I50.32]  Yes     Chronic     2-23-17    1 - Low normal to mildly depressed left ventricular systolic function (EF 50-55%).     2 - Right ventricular enlargement with mildly depressed systolic function.     3 - Left  ventricular diastolic dysfunction.     4 - Right atrial enlargement.     5 - Severe tricuspid regurgitation.     6 - Pulmonary hypertension. The estimated PA systolic pressure is 86 mmHg.     7 - Increased central venous pressure.       History of Intracerebral Hemorrhage: L BG 5/2013; R BG 9/2016; R BG 11/2016; L caudate head 2/2017 [Z86.79]  Not Applicable     Chronic    Acute respiratory failure with hypoxia [J96.01]  Yes    Anemia in chronic kidney disease [N18.9, D63.1]  Yes     Chronic      Resolved Hospital Problems   No resolved problems to display.       Covid-19 Virus Infection  - COVID-19 testing: Positive on 3/25 and again on 4/22  - Isolation: Airborne/Droplet. Surgical mask on patient. Notify Infection Control  - Diagnostics: Trend Q48hrs if stable, more frequently if patient decompensating.  Lymphopenia, hyponatremia, hyperferritinemia, elevated troponin, elevated d-dimer, age, and comorbidities are significant predictors of poor clinical outcome)  o CBC:  WBC 15  o CMP:  ESRD on HD  o Ferritin:    o D-dimer:    o CRP:  270  o BNP:    o CPK:  957  o LDH:  226  o Troponin:  0.439  o Procalcitonin:  5.39  o CXR:  Improving consolidations  o Blood culture x2 4/22/2020 NGTD  o Sputum culture 4/22/2020 NGTD    - Management: Per [facility] COVID Treatment Protocol (4/15/20)    - Monitoring:   - Telemetry & Continuous Pulse Oximetry    - Nutrition:    - Multivitamin PO daily   - Add Boost supplement   - Vitamin D 1000IU daily if deficient   - Ascorbic acid 500mg PO bid    - Supportive Care:   - acetaminophen 650mg PO Q6hr PRN fever/headache   - loperamide PRN viral diarrhea   - IVF if indicated, restrictive strategy preferred, no maintenance IV if able   - VTE PPx: enoxaparin or heparin SQ unless contraindicated    - Antibiotics:  - if indications, CXR findings, elevated procal. See protocol for alternatives.   - Discontinue early if low concern for bacterial co-infection   - Vanc/Zosyn given COVID and  recent hospitalization    Acute Hypoxemic Respiratory Failure  Acute Respiratory Disease 2/2 COVID19  - Order RT consult via Respiratory Communication for COVID Protocols  - Order Incentive Spirometer Q4h  - Order Flutter Valve Q4h  - Continuous Pulse Oximetry  - Goal SpO2 92-96%  - Supplemental O2 via LFNC, VentiMask, or HFNC (see Respiratory Support Oxygen Therapies)  - If wheezing, albuterol INH Q6h scheduled & PRN  - Proning Protocol if patient is a candidate (see HM Proning Protocol)   - GCS >13, able to self-prone  - If deterioration, may warrant trial of NIPPV and transfer to [facility] pressure room or immediate ICU consult  - CXR with multifocal opacities  - Satting 100% on 2L NC  - Continue Vanc/Zosyn given recent hospitalization    Chronic Upper GIB  Erosive Gastritis  · Reports of recurrent and chronic GIB  · Last EGD 2/14/20 with non-bleeding erosive gastropathy  · Continue PPI BID  · GI consult given severe drop in anemia, no plans for EGD currently  · Hg     Chronic Blood Loss Anemia  Anemia in CKD  · Hemoglobin 7.2 on admit with tachycardia  · [facility] 9 [facility]  · Type and screen ordered  · Transfuse 1 unit PRBCs  · Repeat Hg this AM of 8 today    History of ICH  · 2013  · Resides at [facility]    Chronic Diastolic Heart Failure  · Chronic and stable  · Not on BP medications    HD Associated Hypotension  · Chronic issue  · Continue Midodrine with HD MWF    CKD Mineral and Bone Disorder  ESRD on HD  · Chronic and stable  · Continue Renvela TID    Seizure Disorder  · Chronic and stable  · Continue Keppra 250mg BID  · Seizure precautions    Sacral Pressure Injury  · Wound Care consult    Lower Extremity DVT  - on heparin gtt currently  - due to high bleeding risk hesistant to commit patient to long term oral anticoagulation especially as this may be a chronic thrombus.     Rt lower lung Pulmonary embolism  - treatment heparing gtt.   - extensive discussion with family concerning risk/benefit of  anticoagulation in history of recurrent UGI bleeding. Currenlty ok with hepain gtt but want to discuss as family concerning long term anticoagulation. As I cannot tell the age of the pulmonary embolism (know chronic LE DVT) it is difficult to tell if this was primary issue for shortness of breath as complicated mulitfactorial issues like probable PNA and Anemia on admission       Patient's chronic/stable medical conditions noted in the assessment above will be managed with the patient's home medications as tolerated.      Diet:  Renal pleasure feeds, TF  VTE PPx:  Heparin BID  Goals of Care:  Full code based on paperwork from NH.  Was DNR on previous admission.    4/25: sister updated on phone at 2:40pm    Vania Calzada

## 2020-04-26 NOTE — PLAN OF CARE
Vital signs stable. Patient tachycardic. Patient sating well on room air. Patient suctioned frequently. Patient tolerating tube feedings at 60cc/hr. Heparin drip running at 15 units/hr.  Problem: Wound  Goal: Optimal Wound Healing  Outcome: Ongoing, Progressing     Problem: Fall Injury Risk  Goal: Absence of Fall and Fall-Related Injury  Outcome: Ongoing, Progressing     Problem: Adult Inpatient Plan of Care  Goal: Plan of Care Review  Outcome: Ongoing, Progressing  Goal: Patient-Specific Goal (Individualization)  Outcome: Ongoing, Progressing  Goal: Absence of Hospital-Acquired Illness or Injury  Outcome: Ongoing, Progressing  Goal: Optimal Comfort and Wellbeing  Outcome: Ongoing, Progressing  Goal: Readiness for Transition of Care  Outcome: Ongoing, Progressing  Goal: Rounds/Family Conference  Outcome: Ongoing, Progressing     Problem: Diabetes Comorbidity  Goal: Blood Glucose Level Within Desired Range  Outcome: Ongoing, Progressing     Problem: Skin Injury Risk Increased  Goal: Skin Health and Integrity  Outcome: Ongoing, Progressing     Problem: Device-Related Complication Risk (Hemodialysis)  Goal: Safe, Effective Therapy Delivery  Outcome: Ongoing, Progressing     Problem: Hemodynamic Instability (Hemodialysis)  Goal: Vital Signs Remain in Desired Range  Outcome: Ongoing, Progressing     Problem: Infection (Hemodialysis)  Goal: Absence of Infection Signs/Symptoms  Outcome: Ongoing, Progressing     Problem: Malnutrition  Goal: Improved Nutritional Intake  Outcome: Ongoing, Progressing     Problem: Coping Ineffective  Goal: Effective Coping  Outcome: Ongoing, Progressing

## 2020-04-26 NOTE — NURSING
Pt appears to be in distress, labored breathing noted. Lung sounds diminished, 02 sat 99% on 1LPM via NC. Hyperactive bowel sounds noted. Team IMK notified, new verbal physician order to hold gtube feeding until 0700. HOB elevated. Safety Precautions maintained at all times. Will cont. To monitor closely.

## 2020-04-26 NOTE — PROGRESS NOTES
Pharmacokinetic Assessment Follow Up: IV Vancomycin    Vancomycin serum concentration assessment(s):    -Vancomycin level was drawn appropriately and can be used to guide therapy.   -Level of 28.5 mcg/mL remains above therapeutic range 15-20 mcg/mL for LRTI.  -Nephrology is consulted for ESRD on iHD, last received 4/25.     Pharmacokinetic Estimates:  -ke =0.021 hr-1  -T 1/2 = 33.4 hrs  -Est. level in 24 hrs =17 mcg/mL    Vancomycin Regimen Plan:    -Hold vancomycin dose today.  -Re-assess daily random level with AM labs. Re-dose for levels < 20 mcg/mL.  -Follow up for nephrology recommendations for iHD.    Drug levels (last 3 results):  Recent Labs   Lab Result Units 04/23/20  1145 04/25/20  0608 04/26/20  0453   Vancomycin, Random ug/mL 7.8 45.9 28.5       Pharmacy will continue to follow and monitor vancomycin.    Please contact pharmacy at extension [phone] for questions regarding this assessment.    Thank you for the consult,   Gopal Ramos       Patient brief summary:  Vaughn Retana is a 54 y.o. male initiated on antimicrobial therapy with IV Vancomycin for treatment of lower respiratory infection    Drug Allergies:   Review of patient's allergies indicates:   Allergen Reactions    Fosrenol [lanthanum] Nausea And Vomiting     Nausea and vomiting       Actual Body Weight:   44.5 kg    Renal Function:   Estimated Creatinine Clearance: 21 mL/min (A) (based on SCr of 2.5 mg/dL (H)).,     Dialysis Method (if applicable):  intermittent HD    CBC (last 72 hours):  Recent Labs   Lab Result Units 04/23/20  1151 04/25/20  0608 04/26/20  0453   WBC K/uL 15.73* 9.41 7.64   Hemoglobin g/dL 8.9* 8.0* 8.2*   Hematocrit % 27.4* 26.7* 26.5*   Platelets K/uL 201 209 228   Gran% % 85.3* 68.1 75.9*   Lymph% % 6.9* 16.4* 11.8*   Mono% % 6.8 13.0 8.5   Eosinophil% % 0.3 1.5 2.7   Basophil% % 0.1 0.3 0.1   Differential Method  Automated Automated Automated       Metabolic Panel (last 72 hours):  Recent Labs   Lab Result Units  04/23/20  1145 04/25/20  0608 04/26/20  0453   Sodium mmol/L  --  136 137   Potassium mmol/L  --  4.1 3.6   Chloride mmol/L  --  101 100   CO2 mmol/L  --  21* 29   Glucose mg/dL  --  219* 137*   BUN, Bld mg/dL  --  49* 27*   Creatinine mg/dL  --  5.2* 2.5*   Albumin g/dL  --  1.6* 1.6*   Total Bilirubin mg/dL  --  0.3 0.3   Alkaline Phosphatase U/L  --  81 85   AST U/L  --  38 43*   ALT U/L  --  15 18   Magnesium mg/dL  --   --  1.9   Phosphorus mg/dL 5.8*  --  1.1*       Vancomycin Administrations:  vancomycin given in the last 96 hours                   vancomycin 1.25 g in dextrose 5% 250 mL IVPB (ready to mix) (mg) 1,250 mg New Bag 04/24/20 1838                Microbiologic Results:  Microbiology Results (last 7 days)     Procedure Component Value Units Date/Time    Blood culture x two cultures. Draw prior to antibiotics. [[id number]] Collected:  04/22/20 1849    Order Status:  Completed Specimen:  Blood from Peripheral, Antecubital, Left Updated:  04/25/20 2212     Blood Culture, Routine No Growth to date      No Growth to date      No Growth to date      No Growth to date    Narrative:       Aerobic and anaerobic    Blood culture [[id number]] Collected:  04/25/20 0804    Order Status:  Completed Specimen:  Blood Updated:  04/25/20 1545     Blood Culture, Routine No Growth to date    Narrative:       ADD-ON CRP #[id number] Per. mitch Calzada MD  04/25/2020  08:33     Blood culture x two cultures. Draw prior to antibiotics. [[id number]]  (Abnormal) Collected:  04/22/20 1919    Order Status:  Completed Specimen:  Blood from Peripheral, Forearm, Left Updated:  04/25/20 1111     Blood Culture, Routine Gram stain miya bottle: Gram positive cocci in clusters resembling Staph       Results called to and read back by:Marco Lucero RN 04/24/2020  01:00      COAGULASE-NEGATIVE STAPHYLOCOCCUS SPECIES  Organism is a probable contaminant      Narrative:       Aerobic and anaerobic    Blood culture [[id number]]     Order  Status:  Sent Specimen:  Blood     Blood culture [[id number]]     Order Status:  Sent Specimen:  Blood     Culture, Respiratory with Gram Stain [[id number]]     Order Status:  No result Specimen:  Sputum, Expectorated

## 2020-04-26 NOTE — CARE UPDATE
Rapid Response Nurse Chart Check     Chart check completed, abnormal VS noted. Please call [phone] for further concerns or assistance.

## 2020-04-26 NOTE — NURSING
Pt is awake and alert lying down in supine position on bed, respirations even and unlabored. No sob noted. Pt had dialysis this morning, tolerated well, 1 liter removed. No s/s of pain or discomfort noted at time. Safety Precautions maintained at all times. Will cont. To monitor.

## 2020-04-26 NOTE — CARE UPDATE
Rapid Response Nurse Chart Check     Chart check completed, abnormal VS noted. Lainey GUEVARA contacted, RN to recheck temp. RN instructed to call [phone] for further concerns or assistance.

## 2020-04-26 NOTE — PLAN OF CARE
Pt is awake and alert lying down on bed in supine position. HOB elevated in semi fowlers position. Diminished lung sounds noted. 02 sat 98% on 1 LPM via NC. Large amount of thick clear sputum noted, oral suctioning provided, pt tolerated well. Gtube feeding on hold at time until 0700 this morning, per MD order. Pt is to have dialysis this morning. Safety and Aspiration Precautions maintained at all times. Will cont. To monitor closely.

## 2020-04-27 NOTE — PROGRESS NOTES
Hemodialysis Note  Pt seen and evaluated while undergoing dialysis. Tolerating dialysis with current UFR, without complications. Labs reviewed and dialysate bath adjusted.     BFR: 350  UF goal: 1-2L as tolerated  Anemia: Hgb 8.1; ferritin >00877 4/22/20; on epogen  MBD: corrected Ca+ 11.6; Ph- 1.1; Alb 1.5  Diet: On dysphagia diet; on Novasource renal with TF  HTN: /91; on midodrine PRN

## 2020-04-27 NOTE — PROGRESS NOTES
Pharmacokinetic Assessment Follow Up: IV Vancomycin    Vancomycin serum concentration assessment(s):  · Random level= 35.4 mcg/mL; Goal 15-20, lower respiratory infection  · ESRD on HD; patient will receive HD today  · Hold Vancomycin today  · Re-dose after HD when the random level is less than 25 mcg/mL  · Next level to be drawn 4/28 at 0500    Drug levels (last 3 results):  Recent Labs   Lab Result Units 04/25/20  0608 04/26/20 0453 04/27/20  0258   Vancomycin, Random ug/mL 45.9 28.5 35.4       Pharmacy will continue to follow and monitor vancomycin.    Please contact pharmacy at extension [phone] for questions regarding this assessment.    Thank you for the consult,   Radha Rey       Patient brief summary:  Vaughn Retana is a 54 y.o. male initiated on antimicrobial therapy with IV Vancomycin for treatment of lower respiratory infection    Drug Allergies:   Review of patient's allergies indicates:   Allergen Reactions    Fosrenol [lanthanum] Nausea And Vomiting     Nausea and vomiting       Actual Body Weight:   40 kg    Renal Function:   Estimated Creatinine Clearance: 9.6 mL/min (A) (based on SCr of 4.9 mg/dL (H)).,     Dialysis Method (if applicable):  intermittent HD     CBC (last 72 hours):  Recent Labs   Lab Result Units 04/25/20  0608 04/26/20 0453 04/27/20 0258 04/27/20  1039   WBC K/uL 9.41 7.64 9.16 9.52   Hemoglobin g/dL 8.0* 8.2* 8.0* 8.1*   Hematocrit % 26.7* 26.5* 26.4* 26.1*   Platelets K/uL 209 228 246 233   Gran% % 68.1 75.9* 74.8* 76.7*   Lymph% % 16.4* 11.8* 11.0* 11.4*   Mono% % 13.0 8.5 11.6 9.3   Eosinophil% % 1.5 2.7 1.4 1.3   Basophil% % 0.3 0.1 0.2 0.2   Differential Method  Automated Automated Automated Automated       Metabolic Panel (last 72 hours):  Recent Labs   Lab Result Units 04/25/20  0608 04/26/20  0453 04/27/20  0258 04/27/20  1039   Sodium mmol/L 136 137 133* 133*   Potassium mmol/L 4.1 3.6 4.3 4.3   Chloride mmol/L 101 100 94* 94*   CO2 mmol/L 21* 29 25 26    Glucose mg/dL 219* 137* 303* 375*   BUN, Bld mg/dL 49* 27* 59* 74*   Creatinine mg/dL 5.2* 2.5* 4.4* 4.9*   Albumin g/dL 1.6* 1.6* 1.6* 1.5*   Total Bilirubin mg/dL 0.3 0.3 0.4 0.4   Alkaline Phosphatase U/L 81 85 91 96   AST U/L 38 43* 39 36   ALT U/L 15 18 18 18   Magnesium mg/dL  --  1.9  --   --    Phosphorus mg/dL  --  1.1*  --   --        Vancomycin Administrations:  vancomycin given in the last 96 hours      No antibiotic orders with administrations found.                Microbiologic Results:  Microbiology Results (last 7 days)     Procedure Component Value Units Date/Time    Blood culture [[id number]] Collected:  04/27/20 1039    Order Status:  Sent Specimen:  Blood Updated:  04/27/20 1054    Narrative:       Collection has been rescheduled by 3 at 04/27/2020 09:57 Reason:   Unable to collect  Collection has been rescheduled by 3 at 04/27/2020 10:03 Reason:   Unable to collect  Collection has been rescheduled by 3 at 04/27/2020 10:04 Reason:   spoke with ISMAEL Roland  Collection has been rescheduled by 3 at 04/27/2020 09:57 Reason:   Unable to collect  Collection has been rescheduled by 3 at 04/27/2020 10:03 Reason:   Unable to collect  Collection has been rescheduled by 3 at 04/27/2020 10:04 Reason:   spoke with ISMAEL Roland    Blood culture [[id number]] Collected:  04/25/20 0804    Order Status:  Completed Specimen:  Blood Updated:  04/27/20 1012     Blood Culture, Routine No Growth to date      No Growth to date      No Growth to date    Narrative:       ADD-ON CRP #[id number] Per. mitch Calzada MD  04/25/2020  08:33     Blood culture x two cultures. Draw prior to antibiotics. [[id number]] Collected:  04/22/20 1849    Order Status:  Completed Specimen:  Blood from Peripheral, Antecubital, Left Updated:  04/26/20 2212     Blood Culture, Routine No Growth after 4 days.     Narrative:       Aerobic and anaerobic    Blood culture x two cultures. Draw prior to antibiotics. [[id number]]  (Abnormal)  Collected:  04/22/20 1919    Order Status:  Completed Specimen:  Blood from Peripheral, Forearm, Left Updated:  04/26/20 0956     Blood Culture, Routine Gram stain miya bottle: Gram positive cocci in clusters resembling Staph       Results called to and read back by:Marco Lucero RN 04/24/2020  01:00      COAGULASE-NEGATIVE STAPHYLOCOCCUS SPECIES  Organism is a probable contaminant      Narrative:       Aerobic and anaerobic    Blood culture [[id number]]     Order Status:  Canceled Specimen:  Blood     Blood culture [[id number]]     Order Status:  Canceled Specimen:  Blood     Culture, Respiratory with Gram Stain [[id number]]     Order Status:  No result Specimen:  Sputum, Expectorated

## 2020-04-27 NOTE — CONSULTS
Therapy with vancomycin complete and/or provider approved discontinuing vancomycin. Pharmacy will sign off, please re-consult as needed.

## 2020-04-27 NOTE — PROGRESS NOTES
Hospital Medicine  History and Physical  [facility]      Patient Name: Vaughn Retana  MRN:  [id number]  Hospital Medicine Team: [facility]OSP MED K Vania Calzada MD  Date of Admission:  4/22/2020     Length of Stay:  LOS: 5 days     Principal Problem: Suspected Covid-19 Virus Infection       History of Present Illness:    Mr. Vaughn Retana is a 54 y.o. male with hypertension, intracranial bleed, end-stage renal on dialysis MWF,L BKA 2/2 osteomyelitis, history of DVT, history of heart failure including pulmonary hypertension with normal EF (last 2017), hx upper GI bleeds, PEG status, seizures, latent TB presented from [facility] for evaluation of shortness of breath.  Patient isn't able to speak, so history is obtained via yes/no questions and ER documentation.  He denies any SOB, cough, fever, or chills and says he feels ok with no pain.  Of note, he just had an extensive hospitalization at [facility] from 3/28-4/15 for COVID.  At the end of his hospital course, he was transfused blood and decided to maintain a conservative approach so GI wasn't involved.  Additionally, he was DNR during the last hospital stay.    Upon arrival to the ER, vitals were temp 98.9F with HR 110s and satting 100% on 2L NC.  CXR showed improved consolidations from his recent hospitalization.  He was given Vanc and Zosyn and was admitted to Hospital Medicine for further management.    Interval History:  + fever this AM. Re-dosed vancomycin and zosyn--cultures remain unremarkable. Repeat CXR and abdominal film without obvious source. Ordered CT head as mental status poor at baseline but he appears less arousable. Lactate < 2. Hg stable. No leukocytosis present. Troponin elevated but plateaued in setting of ESRD. Dialysis scheduled for today. PEG tube looks healthy-no signs of infection. Stage 2 sacral wound present but unchanged--does not appear necrotic and no discharge to indicate this as source.      Previous Concern for G+ cocci in blood sample- but more likely contaminant as one bottle with cogulase negative staph. Continue on zosyn for possible PNA but CXR improved. Hg low on admission--GI has evaluated, Hg responded to 1 UPRBC and no plan for elective procedure at this time. D-dimer at 6 now 4-- concern for GI bleeding risk. Concern for another infectious process than covid at this time.       Review of Systems:  Unable to obtain 2/2 aphasia      Past Medical History: Patient has a past medical history of Amputation stump pain (9/10/2013), Aspiration pneumonia (7/27/2015), Asterixis (11/8/2016), C. difficile colitis (8/7/2015), Cholelithiasis without obstruction (8/25/2015), Chronic diastolic heart failure, Chronic low back pain (12/1/2015), Closed head injury (9/8/2016), DVT (deep venous thrombosis) (7/28/2017), ESRD on hemodialysis (2/7/2013), GERD (gastroesophageal reflux disease), HCV antibody positive, Hemiparesis affecting left side as late effect of stroke (11/08/2016), History of Intracerebral Hemorrhage: L BG 5/2013; R BG 9/2016; R BG 11/2016; L caudate head 2/2017 (11/2/2016), Hypertension, left basal ganglia ICH 5/2013 (11/2/2016), Left Caudate Head ICH 2/22/2017 (2/24/2017), 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      Past Surgical History: Patient has a past surgical history that includes R AVF (9/12/12); Leg amputation through knee (12/18/2013); Foot amputation through metatarsal; Colonoscopy; Colonoscopy (N/A, 4/4/2017); Esophagogastroduodenoscopy (N/A, 6/12/2018); Upper gastrointestinal endoscopy; Esophagogastroduodenoscopy (N/A, 3/7/2019);  Esophagogastroduodenoscopy (N/A, 9/23/2019); Esophagogastroduodenoscopy (N/A, 10/2/2019); Esophagogastroduodenoscopy (N/A, 11/12/2019); and Esophagogastroduodenoscopy (N/A, 2/14/2020).      Social History: Patient reports that he has quit smoking. He has a 10.00 pack-year smoking history. He has never used smokeless tobacco. He reports that he does not drink alcohol or use drugs.      Family History: Patient's family history includes Alcohol abuse in his maternal grandmother; Diabetes in his brother and maternal grandfather; Early death in his mother; Heart disease in his father; Hyperlipidemia in his father; Hypertension in his father and sister; Kidney disease in his father.      Medications: Scheduled Meds:   sodium chloride 0.9%   Intravenous Once    sodium chloride 0.9%   Intravenous Once    albuterol  2 puff Inhalation Q6H    bisacodyL  10 mg Rectal Once    [START ON 4/28/2020] epoetin la nena-ebpx (RETACRIT) injection  50 Units/kg Intravenous Every Tues, Thurs, Sat    levetiracetam oral soln  250 mg Oral BID    metoclopramide HCl  10 mg Oral QID    pantoprazole  40 mg Oral BID    piperacillin-tazobactam (ZOSYN) IVPB  4.5 g Intravenous Q12H    senna-docusate 8.6-50 mg  1 tablet Per G Tube BID    sevelamer carbonate  800 mg Oral TID WM     Continuous Infusions:   heparin (porcine) in D5W 17 Units/kg/hr (04/27/20 0400)     PRN Meds:.sodium chloride 0.9%, sodium chloride 0.9%, acetaminophen, dextromethorphan-guaifenesin  mg/5 ml, Dextrose 10% Bolus, Dextrose 10% Bolus, glucagon (human recombinant), glucose, glucose, insulin aspart U-100, loperamide, melatonin, midodrine, ondansetron, sodium chloride 0.9%, Pharmacy to dose Vancomycin consult **AND** vancomycin - pharmacy to dose      Allergies: Patient is allergic to fosrenol [lanthanum].      Physical Exam:    Temp:  [99 °F (37.2 °C)-102.5 °F (39.2 °C)]   Pulse:  [116-131]   Resp:  [14-26]   BP: (119-132)/(87-98)   SpO2:  [98 %-99 %]      Constitutional: Appears sickly  Head: Normocephalic and atraumatic.   Eyes: EOM are normal. Pupils are equal, round, and reactive to light. No scleral icterus.   Neck: Normal range of motion. Neck supple.   Cardiovascular: Normal heart rate.  Regular heart rhythm.  No murmur heard.  Pulmonary/Chest: Comfortable on 2L NC.  Coarse breath sounds throughout  Abdominal: Soft. Bowel sounds are normal.  No distension.  No tenderness  Musculoskeletal: Left BKA.  Right leg contractures  Neurological: Alert and making eye contact.  Not answering questions  Skin: Skin is warm and dry.   Psychiatric: Normal mood and affect. Behavior is normal.       Laboratory:  Recent Labs   Lab 04/26/20 0453 04/27/20 0258 04/27/20  1039   WBC 7.64 9.16 9.52   LYMPH 11.8*  0.9* 11.0*  1.0 11.4*  1.1   HGB 8.2* 8.0* 8.1*   HCT 26.5* 26.4* 26.1*    246 233     Recent Labs   Lab 04/23/20  1145  04/26/20 0453 04/27/20 0258 04/27/20  1039   NA  --    < > 137 133* 133*   K  --    < > 3.6 4.3 4.3   CL  --    < > 100 94* 94*   CO2  --    < > 29 25 26   BUN  --    < > 27* 59* 74*   CREATININE  --    < > 2.5* 4.4* 4.9*   GLU  --    < > 137* 303* 375*   CALCIUM  --    < > 9.0 9.4 9.6   MG  --   --  1.9  --   --    PHOS 5.8*  --  1.1*  --   --     < > = values in this interval not displayed.     Recent Labs   Lab 04/26/20  0453 04/27/20  0258 04/27/20  1039   ALKPHOS 85 91 96   ALT 18 18 18   AST 43* 39 36   ALBUMIN 1.6* 1.6* 1.5*   PROT 8.7* 8.8* 8.6*   BILITOT 0.3 0.4 0.4      Recent Labs     04/26/20 0453 04/27/20  1039   DDIMER  --  3.48*   .6*  --    TROPONINI  --  0.521*   LACTATE  --  1.6       All labs within the last 24 hours were reviewed.     Microbiology:  Lab Results   Component Value Date    [id number] Positive (A) 04/22/2020       Microbiology Results (last 7 days)     Procedure Component Value Units Date/Time    Blood culture [[id number]] Collected:  04/27/20 1039    Order Status:  Sent Specimen:  Blood Updated:   04/27/20 1054    Narrative:       Collection has been rescheduled by SM3 at 04/27/2020 09:57 Reason:   Unable to collect  Collection has been rescheduled by SM3 at 04/27/2020 10:03 Reason:   Unable to collect  Collection has been rescheduled by SM3 at 04/27/2020 10:04 Reason:   spoke with ISMAEL Roland  Collection has been rescheduled by SM3 at 04/27/2020 09:57 Reason:   Unable to collect  Collection has been rescheduled by SM3 at 04/27/2020 10:03 Reason:   Unable to collect  Collection has been rescheduled by SM3 at 04/27/2020 10:04 Reason:   spoke with ISMAEL Roland    Blood culture [[id number]] Collected:  04/25/20 0804    Order Status:  Completed Specimen:  Blood Updated:  04/27/20 1012     Blood Culture, Routine No Growth to date      No Growth to date      No Growth to date    Narrative:       ADD-ON CRP #[id number] Per. mitch Calzada MD  04/25/2020  08:33     Blood culture x two cultures. Draw prior to antibiotics. [[id number]] Collected:  04/22/20 1849    Order Status:  Completed Specimen:  Blood from Peripheral, Antecubital, Left Updated:  04/26/20 2212     Blood Culture, Routine No Growth after 4 days.     Narrative:       Aerobic and anaerobic    Blood culture x two cultures. Draw prior to antibiotics. [[id number]]  (Abnormal) Collected:  04/22/20 1919    Order Status:  Completed Specimen:  Blood from Peripheral, Forearm, Left Updated:  04/26/20 0956     Blood Culture, Routine Gram stain miya bottle: Gram positive cocci in clusters resembling Staph       Results called to and read back by:Marco Lucero RN 04/24/2020  01:00      COAGULASE-NEGATIVE STAPHYLOCOCCUS SPECIES  Organism is a probable contaminant      Narrative:       Aerobic and anaerobic    Blood culture [[id number]]     Order Status:  Canceled Specimen:  Blood     Blood culture [[id number]]     Order Status:  Canceled Specimen:  Blood     Culture, Respiratory with Gram Stain [[id number]]     Order Status:  No result Specimen:  Sputum, Expectorated              Imaging  ECG Results          EKG 12-lead (Edited Result - FINAL)  Result time 04/23/20 12:54:59    Edited Result - FINAL by Interface, Lab In [facility] (04/23/20 12:54:59)                 Narrative:    Test Reason : R06.00,    Vent. Rate : 113 BPM     Atrial Rate : 113 BPM     P-R Int : 132 ms          QRS Dur : 094 ms      QT Int : 342 ms       P-R-T Axes : 082 067 083 degrees     QTc Int : 469 ms    Age and gender specific analysis  Sinus tachycardia  Incomplete right bundle branch block  Nonspecific T wave abnormality  Cannot exclude Lateral infarct  Abnormal ECG  When compared with ECG of 28-MAR-2020 13:40,  Left anterior fascicular block is no longer Present  lateral infarct is now present  Reconfirmed by KYLER AVERY MD ([id number]) on 4/23/2020 12:54:46 PM    Referred By: AAAREFMARIANNE   SELF           Confirmed By:KYLER AVERY MD                              No results found for this or any previous visit.    X-Ray Abdomen AP 1 View  Narrative: EXAMINATION:  XR ABDOMEN AP 1 VIEW    CLINICAL HISTORY:  fever;    TECHNIQUE:  AP View(s) of the abdomen was performed.    COMPARISON:  03/16/2020    FINDINGS:  There is scattered gas and fecal filled loops of bowel within the abdomen and pelvis.  Evaluation for free air or air-fluid levels limited by supine positioning only.  There is a rounded calcification within the right upper abdomen which is unchanged and compatible with calcified gallstone.  Continued prominent vascular calcifications of the aorta and iliac vasculature.  Overall nonspecific bowel gas pattern.  Further evaluation as warranted clinically.  Impression: Please see above    Electronically signed by: Keith Mace DO  Date:    04/27/2020  Time:    11:10  X-Ray Chest AP Portable  Narrative: EXAMINATION:  XR CHEST AP PORTABLE    CLINICAL HISTORY:  fever;    TECHNIQUE:  Single frontal portable view of the chest was performed.    COMPARISON:  Chest radiograph 04/22/2020    FINDINGS:  Cardiomediastinal  silhouette is within normal limits.    Near complete interval resolution of previously noted left basilar airspace opacity.  Previously noted right basilar airspace opacity appears to have resolved.  Otherwise, no new focal consolidation, overt interstitial edema, sizable pleural effusion or pneumothorax.    Multilevel degenerative changes of the imaged spine.  Impression: 1. No acute pulmonary findings appreciated with near complete interval resolution of previously noted left basilar airspace opacity and apparent complete resolution of prior noted right basilar airspace opacity.    Electronically signed by: Jose Manuel Zheng  Date:    04/27/2020  Time:    11:06      All imaging within the last 24 hours was reviewed.       Assessment and Plan:    Active Hospital Problems    Diagnosis  POA    *COVID-19 virus infection [U07.1]  Yes    Pulmonary embolism [I26.99]  Yes    Palliative care encounter [Z51.5]  Not Applicable    Advanced care planning/counseling discussion [Z71.89]  Not Applicable    Goals of care, counseling/discussion [Z71.89]  Not Applicable    ESRD on dialysis [N18.6, Z99.2]  Not Applicable    Pressure injury due to medical device [T85.898A, L89.90]  Yes    Seizure [R56.9]  Yes    Chronic blood loss anemia [D50.0]  Yes    Hemodialysis-associated hypotension [I95.3]  Yes    Severe malnutrition [E43]  Yes     Assessment and Plan  Severe Malnutrition in the context of Social/Environmental Circumstances     Related to (etiology):  Unknown etiology     Signs and Symptoms (as evidenced by):  Energy Intake: per pt, <75% intake over the last year  Body Fat Depletion: overall subcutaneous fat loss, moderate triceps fat loss and protruding patellas.  Muscle Mass Depletion:  moderate muscle wasting of interosseous, temporal, clavicle, calves and quadriceps  Weight Loss: 24% (39 lbs) x 1 year    Recommendations     Recommendation/Intervention: 1. Should pt be cleared for po diet, recommend renal diet with  texture per SLP along with Novasource ONS TID. 2. Should pt require enteral feeding, initiate Novasource renal formula at 10ml/hr and advance 10ml Q4hrs to goal rate of 40ml/hr (provides 1920kcal, 87g pro, 691ml free water). Provide additional 500ml water flush/24 hrs. Hold for residuals >500ml.  Goals: 1. Pt to receive nutrition < 48 hrs.      Erosive gastritis [K29.60]  Yes    Chronic upper GI bleeding [K92.2]  Yes    Chronic kidney disease-mineral and bone disorder [N18.9, E83.9, M89.9]  Yes     Chronic    Chronic diastolic heart failure [I50.32]  Yes     Chronic     2-23-17    1 - Low normal to mildly depressed left ventricular systolic function (EF 50-55%).     2 - Right ventricular enlargement with mildly depressed systolic function.     3 - Left ventricular diastolic dysfunction.     4 - Right atrial enlargement.     5 - Severe tricuspid regurgitation.     6 - Pulmonary hypertension. The estimated PA systolic pressure is 86 mmHg.     7 - Increased central venous pressure.       History of Intracerebral Hemorrhage: L BG 5/2013; R BG 9/2016; R BG 11/2016; L caudate head 2/2017 [Z86.79]  Not Applicable     Chronic    Acute respiratory failure with hypoxia [J96.01]  Yes    Anemia in chronic kidney disease [N18.9, D63.1]  Yes     Chronic      Resolved Hospital Problems   No resolved problems to display.       Covid-19 Virus Infection  - COVID-19 testing: Positive on 3/25 and again on 4/22  - Isolation: Airborne/Droplet. Surgical mask on patient. Notify Infection Control  - Diagnostics: Trend Q48hrs if stable, more frequently if patient decompensating.  Lymphopenia, hyponatremia, hyperferritinemia, elevated troponin, elevated d-dimer, age, and comorbidities are significant predictors of poor clinical outcome)  o CBC:  WBC 15  o CMP:  ESRD on HD  o Ferritin:    o D-dimer:    o CRP:  270  o BNP:    o CPK:  957  o LDH:  226  o Troponin:  0.439  o Procalcitonin:  5.39  o CXR:  Improving consolidations  o Blood  culture x2 4/22/2020 NGTD  o Sputum culture 4/22/2020 NGTD    - Management: Per [facility] COVID Treatment Protocol (4/15/20)    - Monitoring:   - Telemetry & Continuous Pulse Oximetry    - Nutrition:    - Multivitamin PO daily   - Add Boost supplement   - Vitamin D 1000IU daily if deficient   - Ascorbic acid 500mg PO bid    - Supportive Care:   - acetaminophen 650mg PO Q6hr PRN fever/headache   - loperamide PRN viral diarrhea   - IVF if indicated, restrictive strategy preferred, no maintenance IV if able   - VTE PPx: enoxaparin or heparin SQ unless contraindicated    - Antibiotics:  - if indications, CXR findings, elevated procal. See protocol for alternatives.   - Discontinue early if low concern for bacterial co-infection   - Vanc/Zosyn given COVID and recent hospitalization    Acute Hypoxemic Respiratory Failure  Acute Respiratory Disease 2/2 COVID19  - Order RT consult via Respiratory Communication for COVID Protocols  - Order Incentive Spirometer Q4h  - Order Flutter Valve Q4h  - Continuous Pulse Oximetry  - Goal SpO2 92-96%  - Supplemental O2 via LFNC, VentiMask, or HFNC (see Respiratory Support Oxygen Therapies)  - If wheezing, albuterol INH Q6h scheduled & PRN  - Proning Protocol if patient is a candidate (see [facility] Proning Protocol)   - GCS >13, able to self-prone  - If deterioration, may warrant trial of NIPPV and transfer to neg pressure room or immediate ICU consult  - CXR with multifocal opacities  - Satting 100% on 2L NC  - Continue Vanc/Zosyn given recent hospitalization    Chronic Upper GIB  Erosive Gastritis  · Reports of recurrent and chronic GIB  · Last EGD 2/14/20 with non-bleeding erosive gastropathy  · Continue PPI BID  · GI consult given severe drop in anemia, no plans for EGD currently  · 4/27: Hg 8 stable      Chronic Blood Loss Anemia  Anemia in CKD  · Hemoglobin 7.2 on admit with tachycardia  · Was 9 2 weeks ago at DC  · Type and screen ordered  · Transfused 1 unit PRBCs  · Repeat Hg this AM of  8 today    History of ICH  · 2013  · Resides at [facility]  · Repeat CT scan today, no overt neurological changes but patient appears more lethargic.     Chronic Diastolic Heart Failure  · Chronic and stable  · Not on BP medications    HD Associated Hypotension  · Chronic issue  · Continue Midodrine with HD MWF    CKD Mineral and Bone Disorder  ESRD on HD  · Chronic and stable  · Continue Renvela TID    Seizure Disorder  · Chronic and stable  · Continue Keppra 250mg BID  · Seizure precautions    Sacral Pressure Injury  · Wound Care consult    Lower Extremity DVT  - on heparin gtt currently  - due to high bleeding risk hesistant to commit patient to long term oral anticoagulation especially as this may be a chronic thrombus.     Rt lower lung Pulmonary embolism  - treatment heparing gtt.   - extensive discussion with family concerning risk/benefit of anticoagulation in history of recurrent UGI bleeding. Sebastian ok with hepain gtt but want to discuss as family concerning long term anticoagulation. As I cannot tell the age of the pulmonary embolism (know chronic LE DVT) it is difficult to tell if this was primary issue for shortness of breath as complicated mulitfactorial issues like probable PNA and Anemia on admission       Patient's chronic/stable medical conditions noted in the assessment above will be managed with the patient's home medications as tolerated.      Diet:  Renal pleasure feeds, TF  VTE PPx:  Heparin BID  Goals of Care:  Full code based on paperwork from NH.  Was DNR on previous admission.    4/25: sister updated on phone at 2:40pm    4/27: updated sister on phone. We discussed the significant complexity of medical issues and that my recommendation would be to begin a transition to hospice capacity. She will discuss further with her brother but no decisions made yet. We have discussed continuing heparin gtt in hospital for today but long term my recommendation is to avoid anticoagulation as he  has long history of life threatening recurent GI bleeding.  I was very straight forward that he may not recover from this hospitalization due to significant comorbidities, infection, pulmonary embolism, heart failure, ESRD, bed bound, dementia with severe neurological issues related to prior ICH/stroke and malnutrition with sacral stage 2 wound.     Vania Calzada

## 2020-04-27 NOTE — CARE UPDATE
Rapid Response Nurse Chart Check     Chart check completed, abnormal VS noted. Please call [phone] for further concerns or assistance.

## 2020-04-27 NOTE — CARE UPDATE
Vascular surgery consult acknowledged.  Vascular Fellow to see patient in the morning 4/28.  Please keep patient NPO after midnight for possible intervention.    Hakan Burns  Vascular Surgery

## 2020-04-27 NOTE — PLAN OF CARE
Pt is awake and alert, lying down in supine position on bed. HOB elevated in semi-fowlers position. Frequent productive cough, large amount of large thick clear sputum noted. Oral suctioning provided q 1-2 hours, due to large amount of oral secretions, pt tolerating well. Pt is tachycardic, hr is in the 120-130s. Pt is repositioned in bed x 2 person assist. Heparin drip in progress at 15 units/hr. Gtube placement verified by GI content, GI content returned to stomach. Continuous feeding via Gtube at a rate of 60cc/hr, pt tolerating well. Safety and Aspiration precautions maintained at all times. Will cont. To monitor closely.

## 2020-04-27 NOTE — NURSING
SN spoke to Team IMK and informed of aPTT 36.6 sec, verbal order received to increase heparin drip by 2 units/hr and not to bolus, due to pt having a recent GI bleed. Heparin gtt now running at 17units/hr.

## 2020-04-27 NOTE — PROGRESS NOTES
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  [place] [facility] [place]  from [facility] for evaluation of shortness of breath. Extensive hospitalization at [facility] from 3/28-4/15 for COVID and GI bleed. Received blood transfusion and conservative medical management.        Pt on room air sating 98-99%        Advance Care Planning   Advance Care Planning /Goals of care      Living Will: no  LaPOST: no  Do Not Resuscitate Status: no  Medical Power of : no  Next of kin for medical decision making- Pt's sister Vaishnavi Retana     Decision-Making Capacity: patient is unable to answer questions   Next of kin for medical decisions:      Goals of Care:     Today: Called and spoke to pt's sister, Vaishnavi Retana. Sister is aware of pt's multiple medical issues.  Pt's sister wants pt to continue to recive aggressive care- HD, hospital visits etc.     Spoke to sister about care planning as pt continued to decline. Comfort care discussed as an option if pt declines mores and unable to tolerate treatment.     Ultimately, pt sister wants him to return to NH and continue HD. She wants pt to remain full code at this time but opened to continued discussions if pt sharply declines in hospital.     Support given to sister who expresses concern for pt.     4-  - Unable to have conversation with Mr. Retana.  Spoke with his sister Vaishnavi Retana.  - Provided Ms. Retana with clinical updates and discussed current plan of care. -  - Discussed concern that his nutritional status is poor which can influence his chances of continued recovery from COVID infection.  Has PEG and tube feedings.  Speech following.  - Family amenable to dietary  changes as necessary.  - Ms. Retana states patient was doing well and feels the nursing home may have been to quick to send back to hospital.  She reports Mr. Retana often has weakness and shortness of breath after his HD  - Family is not amenable to DNR.  She agreed to this last admit because he was so sick.    - Explained with COVID 19 patient status may decline rapidly.  Ms. Retana is amenable to further discussion if he declines        Following goals established  · Continue current plan of care  · Family amenable to speech pathology and additional swallow study   · Family would like to receive regular updates and discuss need for escalation of care  · Not amenable to DNR         Recommendations:   · Continue current plan of care  · Discharge back to [facility] when discharge criteria met.     If pt declines further, sister open to continued goals of care talks.     18 minutes spent with advance care planning.

## 2020-04-27 NOTE — PLAN OF CARE
Patient on COVID-19 precautions.  Admission assessment verified and completed via medical record.  From [facility].       04/27/20 1424   Discharge Assessment   Assessment Type Discharge Planning Assessment   Assessment information obtained from? Medical Record   Prior to hospitilization cognitive status: Alert/Oriented   Prior to hospitalization functional status: Partially Dependent;Wheelchair Bound   Current cognitive status: Unable to Assess   Current Functional Status: Needs Assistance;Wheelchair Bound   Lives With facility resident   Able to Return to Prior Arrangements yes   Is patient able to care for self after discharge? Yes   Who are your caregiver(s) and their phone number(s)? Alicia Retana, sister [phone]   Patient currently being followed by outpatient case management? No   Patient currently receives any other outside agency services? No   Equipment Currently Used at Home other (see comments)  (per facility)   Do you have any problems affording any of your prescribed medications? No   Is the patient taking medications as prescribed? yes   Does the patient have transportation home? Yes   Does the patient receive services at the [facility]? No   Discharge Plan A Return to nursing home   Discharge Plan B Return to Nursing Home   DME Needed Upon Discharge  other (see comments);none       Mitch Das RN MSN  Critical Care-   Ext. [phone]

## 2020-04-27 NOTE — CARE UPDATE
Rapid Response Nurse Follow-up Note     Followed up with patient for proactive rounding.   Lactic 1.6. Remains tachycardic HR 110s, unchanged.   Vascular surgery consulted for HD access malfunction.   Team will continue to follow.  Please call Rapid Response RN, Susan Borja RN with any questions or concerns at [phone].

## 2020-04-27 NOTE — PROGRESS NOTES
ISO ESRD.  Pt transported to unit in bed. Covid positive.  HD txt initiated to RICHARD fistula.  Tolerated well.  Pt's nurse brought his heparin drip down to unit.  We both observed his blood being bright red when dialysis first initiated. After about 10 minutes blood became very dark almost black.  Venous pressure 240, tried adjusting needle, flipping needle, nothing helped.  OC Chirag came to check needle and said it looks to be an access problem. She is contacting Jasmin Orozco NP.   Dialysis terminated because his access was not working properly.

## 2020-04-27 NOTE — PROGRESS NOTES
Treatment ended after approximately 30 minutes. At 200 blood flow, venous pressures 220.  Unable to rinse patient back. Saline will not move through the machine. Venous needle pushing blood into the syringe and sluggish to flush due high pressure in the access.  Nayeli Orozco NP and Dr. Calzada made aware that dialysis is unable to be completed due to access malfunction.

## 2020-04-27 NOTE — CARE UPDATE
Rapid Response Nurse Chart Check     Chart check completed, abnormal VS noted. bedside RN, [place] contacted.   Patient febrile again on broad spectrum abx, PRN tylenol given.   Patient lethargic per RN but BP stable at this time. HR still in 120s.  Spoke with Dr. Calzada, order received to check labs.   Glucose on AM labs 303, order for finger stick placed.  Will continue to follow.  RN instructed to call [phone] for further concerns or assistance.

## 2020-04-27 NOTE — PLAN OF CARE
Pt alert, very lethargic, not responding much to orientation questions. Pt spiked temp of 102.5 this morning, Md is aware tylenol was given. Pt then spiked another temp at 5pm of 101.5 Md was notified and tylenol was given . Free of falls and injuries. Pt complains of pain when moved. Pt was unable to complete dialysis and had Vascular consult for fistula. Pt is to be NPO at midnight and tube feeding is to be held. Pt received Bolus of 500ml NS per orders. Pt received Biscodyl suppository and Senna once per orders. Pt had Abdominal X ray and 12 Lead EKG done, pt still needs Head CT. Pt received PRN insulin for BS of 347 per orders. BS monitoring initaited and maintained per orders. APTT remained therapuetic @ 42.1 for third consecutive time, Heparin gtt maintained at 17units/kg/hr @ 7.63ml/hr per orders. Next APTT due 4/28. Wound care done per orders. See previous notes, will continue to monitor.   Problem: Wound  Goal: Optimal Wound Healing  Outcome: Ongoing, Progressing  Intervention: Promote Effective Wound Healing  Flowsheets (Taken 4/27/2020 1841)  Oral Nutrition Promotion: calorie dense foods provided  Pain Management Interventions: care clustered     Problem: Fall Injury Risk  Goal: Absence of Fall and Fall-Related Injury  Outcome: Ongoing, Progressing  Intervention: Identify and Manage Contributors to Fall Injury Risk  Flowsheets (Taken 4/27/2020 1841)  Self-Care Promotion: independence encouraged; BADL personal objects within reach; BADL personal routines maintained; meal setup provided  Medication Review/Management: medications reviewed  Intervention: Promote Injury-Free Environment  Flowsheets (Taken 4/27/2020 1841)  Safety Promotion/Fall Prevention: assistive device/personal item within reach; bed alarm set; Fall Risk reviewed with patient/family; diversional activities provided; Fall Risk signage in place; lighting adjusted; medications reviewed; nonskid shoes/socks when out of bed; side rails raised x  2  Environmental Safety Modification: assistive device/personal items within reach; lighting adjusted; mattress on floor; clutter free environment maintained; room organization consistent     Problem: Adult Inpatient Plan of Care  Goal: Plan of Care Review  Outcome: Ongoing, Progressing  Flowsheets (Taken 4/27/2020 1841)  Plan of Care Reviewed With: patient  Goal: Patient-Specific Goal (Individualization)  Outcome: Ongoing, Progressing  Goal: Absence of Hospital-Acquired Illness or Injury  Outcome: Ongoing, Progressing  Intervention: Identify and Manage Fall Risk  Flowsheets (Taken 4/27/2020 1841)  Safety Promotion/Fall Prevention: assistive device/personal item within reach; bed alarm set; Fall Risk reviewed with patient/family; diversional activities provided; Fall Risk signage in place; lighting adjusted; medications reviewed; nonskid shoes/socks when out of bed; side rails raised x 2  Intervention: Prevent VTE (venous thromboembolism)  Flowsheets (Taken 4/27/2020 1841)  VTE Prevention/Management: bleeding precautions maintained; bleeding risk assessed; bleeding risk factor(s) identified, provider notified; fluids promoted  Goal: Optimal Comfort and Wellbeing  Outcome: Ongoing, Progressing  Intervention: Provide Person-Centered Care  Flowsheets (Taken 4/27/2020 1841)  Trust Relationship/Rapport: care explained; choices provided; emotional support provided; empathic listening provided; questions answered; thoughts/feelings acknowledged; reassurance provided; questions encouraged  Goal: Readiness for Transition of Care  Outcome: Ongoing, Progressing  Goal: Rounds/Family Conference  Outcome: Ongoing, Progressing     Problem: Diabetes Comorbidity  Goal: Blood Glucose Level Within Desired Range  Outcome: Ongoing, Progressing  Intervention: Maintain Glycemic Control  Flowsheets (Taken 4/27/2020 1841)  Glycemic Management: blood glucose monitoring     Problem: Skin Injury Risk Increased  Goal: Skin Health and  Integrity  Outcome: Ongoing, Progressing  Intervention: Optimize Skin Protection  Flowsheets (Taken 4/27/2020 1841)  Pressure Reduction Techniques: frequent weight shift encouraged; weight shift assistance provided  Pressure Reduction Devices: pressure-redistributing mattress utilized  Skin Protection: adhesive use limited; electrode sites changed; incontinence pads utilized; tubing/devices free from skin contact  Head of Bed (HOB): HOB at 30 degrees; HOB at 30-45 degrees  Intervention: Promote and Optimize Oral Intake  Flowsheets (Taken 4/27/2020 1841)  Oral Nutrition Promotion: calorie dense foods provided     Problem: Device-Related Complication Risk (Hemodialysis)  Goal: Safe, Effective Therapy Delivery  Outcome: Ongoing, Progressing  Intervention: Optimize Device Care and Function  Flowsheets (Taken 4/27/2020 1841)  Circuit Management: --  Medication Review/Management: medications reviewed     Problem: Hemodynamic Instability (Hemodialysis)  Goal: Vital Signs Remain in Desired Range  Outcome: Ongoing, Progressing  Intervention: Optimize Blood Flow  Flowsheets (Taken 4/27/2020 1841)  Circuit Management: air detection alarms on  Medication Review/Management: medications reviewed     Problem: Infection (Hemodialysis)  Goal: Absence of Infection Signs/Symptoms  Outcome: Ongoing, Progressing  Intervention: Prevent or Manage Infection  Flowsheets (Taken 4/27/2020 1841)  Infection Prevention: cohorting utilized; environmental surveillance performed; equipment surfaces disinfected; personal protective equipment utilized; rest/sleep promoted; visitors restricted/screened; single patient room provided     Problem: Malnutrition  Goal: Improved Nutritional Intake  Outcome: Ongoing, Progressing  Intervention: Promote and Optimize Oral Intake  Flowsheets (Taken 4/27/2020 1841)  Oral Nutrition Promotion: calorie dense foods provided     Problem: Coping Ineffective  Goal: Effective Coping  Outcome: Ongoing, Progressing

## 2020-04-27 NOTE — PROGRESS NOTES
HD txt terminated early due to access problems. Venous pressure 220-240 @ . Unable to run saline through machine so unable to rinse patient back. LAUREL Beard spoke to JAZ Orozco NP and the primary Dr. Calzada about having a vascular consult.  Pt needles removed and dressing applied. Pt transported back to room via bed.

## 2020-04-28 PROBLEM — D72.829 LEUKOCYTOSIS: Status: ACTIVE | Noted: 2020-01-01

## 2020-04-28 NOTE — PROGRESS NOTES
Pharmacokinetic Assessment Follow Up: IV Vancomycin    Vancomycin serum concentration assessment(s):  · Random level= 29.1 mcg/mL; Goal 15-20, lower respiratory infection  · ESRD on HD; patient will receive HD today after fistulagram   · Hold Vancomycin today  · Re-dose after HD when the random level is less than 25 mcg/mL  · Next level to be drawn 4/29 at 0500    Drug levels (last 3 results):  Recent Labs   Lab Result Units 04/26/20 0453 04/27/20 0258 04/28/20  0521   Vancomycin, Random ug/mL 28.5 35.4 29.1     Pharmacy will continue to follow and monitor vancomycin.    Please contact pharmacy at extension [phone] for questions regarding this assessment.    Thank you for the consult,   Radha Rey, PharmD, BCPS     Patient brief summary:  Vaughn Retana is a 54 y.o. male initiated on antimicrobial therapy with IV Vancomycin for treatment of lower respiratory infection    Drug Allergies:   Review of patient's allergies indicates:   Allergen Reactions    Fosrenol [lanthanum] Nausea And Vomiting     Nausea and vomiting     Actual Body Weight:   40 kg    Renal Function:   Estimated Creatinine Clearance: 8.3 mL/min (A) (based on SCr of 5.7 mg/dL (H)).,     Dialysis Method (if applicable):  intermittent HD     CBC (last 72 hours):  Recent Labs   Lab Result Units 04/26/20 0453 04/27/20 0258 04/27/20  1039 04/28/20  0521   WBC K/uL 7.64 9.16 9.52 12.10   Hemoglobin g/dL 8.2* 8.0* 8.1* 7.4*   Hematocrit % 26.5* 26.4* 26.1* 24.3*   Platelets K/uL 228 246 233 220   Gran% % 75.9* 74.8* 76.7* 76.2*   Lymph% % 11.8* 11.0* 11.4* 10.1*   Mono% % 8.5 11.6 9.3 10.1   Eosinophil% % 2.7 1.4 1.3 2.4   Basophil% % 0.1 0.2 0.2 0.2   Differential Method  Automated Automated Automated Automated       Metabolic Panel (last 72 hours):  Recent Labs   Lab Result Units 04/26/20 0453 04/27/20 0258 04/27/20  1039 04/28/20  0521   Sodium mmol/L 137 133* 133* 132*   Potassium mmol/L 3.6 4.3 4.3 5.0   Chloride mmol/L 100 94* 94* 91*    CO2 mmol/L 29 25 26 26   Glucose mg/dL 137* 303* 375* 243*   BUN, Bld mg/dL 27* 59* 74* 94*   Creatinine mg/dL 2.5* 4.4* 4.9* 5.7*   Albumin g/dL 1.6* 1.6* 1.5* 1.4*   Total Bilirubin mg/dL 0.3 0.4 0.4 0.5   Alkaline Phosphatase U/L 85 91 96 80   AST U/L 43* 39 36 28   ALT U/L 18 18 18 16   Magnesium mg/dL 1.9  --   --  2.2   Phosphorus mg/dL 1.1*  --   --  4.0       Vancomycin Administrations:  vancomycin given in the last 96 hours      No antibiotic orders with administrations found.                Microbiologic Results:  Microbiology Results (last 7 days)     Procedure Component Value Units Date/Time    Blood culture [[id number]] Collected:  04/27/20 1039    Order Status:  Completed Specimen:  Blood Updated:  04/27/20 1915     Blood Culture, Routine No Growth to date    Narrative:       Collection has been rescheduled by 3 at 04/27/2020 09:57 Reason:   Unable to collect  Collection has been rescheduled by 3 at 04/27/2020 10:03 Reason:   Unable to collect  Collection has been rescheduled by 3 at 04/27/2020 10:04 Reason:   spoke with ISMAEL Roland  Collection has been rescheduled by 3 at 04/27/2020 09:57 Reason:   Unable to collect  Collection has been rescheduled by 3 at 04/27/2020 10:03 Reason:   Unable to collect  Collection has been rescheduled by 3 at 04/27/2020 10:04 Reason:   spoke with ISMAEL Roland    Blood culture [[id number]] Collected:  04/25/20 0804    Order Status:  Completed Specimen:  Blood Updated:  04/27/20 1012     Blood Culture, Routine No Growth to date      No Growth to date      No Growth to date    Narrative:       ADD-ON CRP #[id number] Per. mitch Calzada MD  04/25/2020  08:33     Blood culture x two cultures. Draw prior to antibiotics. [[id number]] Collected:  04/22/20 1849    Order Status:  Completed Specimen:  Blood from Peripheral, Antecubital, Left Updated:  04/26/20 2212     Blood Culture, Routine No Growth after 4 days.     Narrative:       Aerobic and anaerobic    Blood  culture x two cultures. Draw prior to antibiotics. [[id number]]  (Abnormal) Collected:  04/22/20 1919    Order Status:  Completed Specimen:  Blood from Peripheral, Forearm, Left Updated:  04/26/20 0956     Blood Culture, Routine Gram stain miya bottle: Gram positive cocci in clusters resembling Staph       Results called to and read back by:Marco Lucero RN 04/24/2020  01:00      COAGULASE-NEGATIVE STAPHYLOCOCCUS SPECIES  Organism is a probable contaminant      Narrative:       Aerobic and anaerobic    Blood culture [[id number]]     Order Status:  Canceled Specimen:  Blood     Blood culture [[id number]]     Order Status:  Canceled Specimen:  Blood     Culture, Respiratory with Gram Stain [[id number]]     Order Status:  No result Specimen:  Sputum, Expectorated

## 2020-04-28 NOTE — OP NOTE
Surgery Date: 4/28/2020      Surgeon(s) and Role:     * SHEA Alfonso III, MD - Primary     Assisting Surgeon: Woo Tompkins MD     Pre-op Diagnosis:  Thrombosis, r AVF     Post-op Diagnosis:  Same     PROCEDURES:     1. Perc declot (Possis) with PTA (7x40), R AVF  2. PTA, R subclavian vein (10x40)  3. Conscious Sedation     Anesthesia: Choice     Description of Procedure: Successful declot.  May use     Description of the findings of the procedure:  As above     Estimated Blood Loss: <4cc         Specimens:       Specimen (12h ago, onward)     None         The patient was seen in the Holding Room. The risks, benefits, complications, treatment options, and expected outcomes were discussed with the patient. The patient concurred with the proposed plan, giving informed consent.  The site of surgery properly noted/marked.     Patient was brought to the cath lab and positioned supine on the table. Moderate sedation was administered by cath lab RN. Patient's right was prepped and draped in usual sterile fashion. A Time Out was held and the above information confirmed.    After an informed consent was obtained, the patient was brought to the Cath Lab and placed in the supine psition. The right upper extremity was prepped and draped in the standard surgical fashion. The area overlying the planned access site was anesthetized using 1% lidocaine.  The distal aspect of the right AVF (by the arterial anastomosis) was accessed in the Venous facing direction with a micropuncture needle and wire, with confirmation of placement with fluoroscopy. This was followed by the micropuncture sheath. The micropuncture wire was exchanged for a stiff angled glidewire and the micropuncture sheath was exchanged for a 6 Czech short sheath. The 0.035 in Glidewire with Glide catheter was used to advance wire into the superior vena cava. We performed balloon angioplasty of an area of previous stent placement in the subclavian vein with a 7x60mm  balloon. Central venogram confirmed patency of the central circulation. The patient was anticoagulated with IV heparin. Next the Possis angioget catheter was used in pulse spray mode to instill tPA throughout entire length of fistula.      Next the Possis Angiojet AVX catheter was placed through the venous facing sheath and the clot was aspirated with percutaneous mechanical thrombectomy. This was repeated several times to ensure adequate thrombectomy.  Follow up fistulogram demonstrated minimal residual thrombus central to the previously placed stent. This was macerated with a [id number] balloon angioplasty. We repeated angioplasty in both the proximal and distal aspect of the stent.    Completion fistulogram demonstrated resolution of thrombus and good venous outflow stenosis with brisk washout of contrast.  Palpable thrill in left femoral AV graft.  U stitches of 3-0 Monocryl were placed around sheaths and sheaths were removed.  Pressure held on access sites for 5 minutes with good hemostasis.  Sterile dressing applied and patient taken to PACU in stable condition.     Dr. Alfonso was scrubbed and present for the procedure.    MODERATE SEDATION IN CATH LAB   JASSON Alfonso MD was present for moderate sedation  JASSON Alfonso MD monitored the patients cardio respiratory functions during the moderate sedation   See nurses notes for Intra-service start and end times and for the log of the name of the RN who administered the medicines for the moderate sedation, including their doseage and route.    Time of sedation:  30mins.    Redd Tompkins MD PGY6  Vascular and Endovascular Surgery Fellow  04/28/2020

## 2020-04-28 NOTE — CONSULTS
[facility]  Infectious Disease  Consult Note    Patient Name: Vaughn Retana  MRN: [id number]  Admission Date: 4/22/2020  Hospital Length of Stay: 6 days  Attending Physician: Vania Calzada MD  Primary Care Provider: Cori Randall MD     Isolation Status: Airborne and Contact and Droplet, Airborne and Contact and Droplet    Patient information was obtained from patient, past medical records and ER records.      Inpatient consult to Infectious Diseases  Consult performed by: Venu Franco PA-C  Consult ordered by: Vania Calzada MD        Assessment/Plan:     * COVID-19 virus infection  Pt is a 54 y.o. male with  HTN, intracranial bleed, ESRD on HD MWF,L BKA 2/2 osteomyelitis, history of DVT, history of heart failure, hx upper GI bleeds, PEG status, seizures, latent TB presented from [facility] for evaluation of shortness of breath.   Of note, pt recently hospitalized COVID positive.  Pt discharged afebrile w/o respiratory complaints.     Upon arrival, pt w/ elevated ferritin, procal, d-dimer, and elevated CRP.  Repeat Covid +.  CXR showed improved consolidations from his recent hospitalization.  Started on empiric Vanc and Zosyn initially.  Pt with hx of GI bleed was given blood as was anemic.  Pt seen by GI w/o plans for endoscopic evaluation.  Pt also found to have GPC in blood but later resulted in CONS in 1/4 bottles believed to be a contaminant.      Pt  found to have PE on CTA chest on 4/24 and US LE w/ partially occlusive thrombus, may be chronic.  Pt started on heparin drip after discussion with pt's family of the risk w/ pt's GI bleed hx. X Ray Chest 4/27 w/ near complete interval resolution of left basilar airspace opatcity and completer resolution of prior noted right basilar airspace opacity.    Pt unable to complete HD yesterday through AVF, seen by Vascular who will perform fistulogram.    ID consulted as pt w/ persistent F of unknown origin. tmax  yesterday 102.5.  CRP remains elevated at 224  Repeat blood cxs 4/25 and 4/27 NGTD. Pt on RA.     Plan  -Would complete 7 day course of Zosyn for pneumonia (est end date 4/29/20)  -Suspect F a result of clots due to COVID 19 coagulopathy  -Monitor pt off of abx after Zosyn  -If worsens, would consider respiratory cultures.  -ID will sign off.  Please re-consult with any new infectious concerns.            Thank you for your consult. I will sign off. Please contact us if you have any additional questions.    Venu Franco PA-C  Infectious Disease  [facility]    Subjective:     Principal Problem: COVID-19 virus infection    HPI: Pt is a 54 y.o. male with hypertension, intracranial bleed, ESRD on HD MWF,L BKA 2/2 osteomyelitis, history of DVT, history of heart failure, hx upper GI bleeds, PEG status, seizures, latent TB presented from [facility] for evaluation of shortness of breath.  Patient isn't able to speak more than yes/no, so history obtained via yes/no questions and ER documentation.  He denies any SOB, cough, fever, or chills.  Of note, pt recently hospitalized COVID positive.   Pt discharged afebrile w/o respiratory complaints.     Upon arrival, pt w/ elevated ferritin, procal, d-dimer, and elevated CRP.  Repeat Covid +.  CXR showed improved consolidations from his recent hospitalization.   Started on empiric Vanc and Zosyn initially.  Pt with hx of GI bleed was given blood as was anemic.  Pt seen by GI w/o plans for endoscopic evaluation.  Pt also found to have GPC in blood but later resulted in CONS in 1/4 bottles believed to be a contaminant.      Pt also found to have on CTA chest to have PE seen on 4/24 and.  US LE w/ partially occlusive thrombus, may be chronic.  Pt started on heparin drip after discussion with pt's family of the risk w/ pt's GI bleed hx. X Ray Chest 4/27 w/ near complete interval resolution of left basilar airspace opatcity and completer  resolution of prior noted right basilar airspace opacity.    Pt unable to complete HD yesterday through AVF, seen by Vascular who will perform fistulogram.    ID consulted as pt w/ persistent F of unknown origin. tmax yesterday 102.5.  CRP remains elevated at 224  Repeat blood cxs 4/25 and 4/27 NGTD. Pt on RA.     Past Medical History:   Diagnosis Date    Amputation stump pain 9/10/2013    Aspiration pneumonia 7/27/2015    Asterixis 11/8/2016    C. difficile colitis 8/7/2015    Cholelithiasis without obstruction 8/25/2015    Chronic diastolic heart failure     2-23-17   1 - Low normal to mildly depressed left ventricular systolic function (EF 50-55%).    2 - Right ventricular enlargement with mildly depressed systolic function.    3 - Left ventricular diastolic dysfunction.    4 - Right atrial enlargement.    5 - Severe tricuspid regurgitation.    6 - Pulmonary hypertension. The estimated PA systolic pressure is 86 mmHg.    7 - Increased central venous pressure.     Chronic low back pain 12/1/2015    Closed head injury 9/8/2016    DVT (deep venous thrombosis) 7/28/2017    ESRD on hemodialysis 2/7/2013    MWF at [facility]   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    Metabolic acidosis, IAG, reduced excretion of inorganic acids     Myoclonic jerking 9/20/2016    Noncompliance with medication regimen 12/4/2018    Secondary hyperparathyroidism (of renal origin)     Secondary pulmonary hypertension 3/23/2017    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    TB lung, latent 08/25/2015     Negative Quantiferon Gold 3-23-17       Past Surgical History:   Procedure Laterality Date    COLONOSCOPY      COLONOSCOPY N/A 4/4/2017    Procedure: COLONOSCOPY;  Surgeon: Walker Stern MD;  [facility]  Service: Endoscopy;  Laterality: N/A;  PA Systolic Pressure 85.56. HD Patient MWF, K+ lab prior to procedure.     ESOPHAGOGASTRODUODENOSCOPY N/A 6/12/2018    Procedure: EGD (ESOPHAGOGASTRODUODENOSCOPY);  Surgeon: Man Galicia MD;  [facility]  Service: Endoscopy;  Laterality: N/A;  EGD in 8-12 weeks with Dr. Galicia on 4th floor for follow up erosive esophagitis and Mejia's surveillance.    Patient should be on Pantoprazole 40mg every 12 hours or the equivulant of another PPI    awaiting for patient to reply back regarding changing    ESOPHAGOGASTRODUODENOSCOPY N/A 3/7/2019    Procedure: EGD (ESOPHAGOGASTRODUODENOSCOPY);  Surgeon: Man Galicia MD;  Location: [facility] ([address] [place] [address]);  Service: Endoscopy;  Laterality: N/A;    ESOPHAGOGASTRODUODENOSCOPY N/A 9/23/2019    Procedure: EGD (ESOPHAGOGASTRODUODENOSCOPY);  Surgeon: Keanu Rainey MD;  Location: [facility] ([address] [place] [address]);  Service: Endoscopy;  Laterality: N/A;    ESOPHAGOGASTRODUODENOSCOPY N/A 10/2/2019    Procedure: EGD (ESOPHAGOGASTRODUODENOSCOPY);  Surgeon: Kevin De La Paz MD;  Location: [facility] ([address] [place] [address]);  Service: Endoscopy;  Laterality: N/A;    ESOPHAGOGASTRODUODENOSCOPY N/A 11/12/2019    Procedure: EGD (ESOPHAGOGASTRODUODENOSCOPY);  Surgeon: Kevin De La Paz MD;  [facility]  Service: Endoscopy;  Laterality: N/A;    ESOPHAGOGASTRODUODENOSCOPY N/A 2/14/2020    Procedure: EGD (ESOPHAGOGASTRODUODENOSCOPY);  Surgeon: Kevin De La Paz MD;  Location: [facility] ([address] [place] [address]);  Service: Endoscopy;  Laterality: N/A;    FOOT AMPUTATION THROUGH METATARSAL      left foot    LEG AMPUTATION THROUGH KNEE  12/18/2013    left BKA    R AVF  9/12/12    UPPER GASTROINTESTINAL ENDOSCOPY         Review of  patient's allergies indicates:   Allergen Reactions    Fosrenol [lanthanum] Nausea And Vomiting     Nausea and vomiting       Medications:  Medications Prior to Admission   Medication Sig    acetaminophen (TYLENOL) 325 MG tablet Take 2 tablets (650 mg total) by mouth every 8 (eight) hours as needed.    folic acid/vit B complex and C (RENAL VITAMIN ORAL) Take by mouth.    hydrALAZINE (APRESOLINE) 50 MG tablet Take 0.5 tablets (25 mg total) by mouth every 8 (eight) hours as needed (for SBP > 170).    levETIRAcetam (KEPPRA) 100 mg/mL Soln Take 2.5 mLs (250 mg total) by mouth 2 (two) times daily.    metoclopramide HCl (REGLAN) 10 MG tablet Take 10 mg by mouth 4 (four) times daily.    midodrine (PROAMATINE) 5 MG Tab Take 1 tablet (5 mg total) by mouth every Mon, Wed, Fri. Please take 30 min before dialysis on Monday Wednesday and Friday as needed    ondansetron (ZOFRAN) 8 MG tablet Take 1 tablet (8 mg total) by mouth every 12 (twelve) hours as needed for Nausea.    pantoprazole (PROTONIX) 40 MG tablet Take 1 tablet (40 mg total) by mouth 2 (two) times daily.    promethazine (PHENERGAN) 25 MG tablet Take 1 tablet (25 mg total) by mouth every 6 (six) hours as needed for Nausea.    RENAPLEX-D 800 mcg-12.5 mg -2,000 unit Tab Take 1 tablet by mouth once daily.    senna (SENOKOT) 8.6 mg tablet Take 2 tablets by mouth once daily.     sevelamer carbonate (RENVELA) 800 mg Tab Take 1 tablet (800 mg total) by mouth 3 (three) times daily with meals.     Antibiotics (From admission, onward)    Start     Stop Route Frequency Ordered    04/27/20 1429  vancomycin - pharmacy to dose  (vancomycin with pharmacy to dose consult)      -- IV pharmacy to manage frequency 04/27/20 1330    04/23/20 0900  piperacillin-tazobactam 4.5 g in sodium chloride 0.9% 100 mL IVPB (ready to mix system)      -- IV Every 12 hours (non-standard times) 04/22/20 2155 04/22/20 2045  vancomycin in dextrose 5 % 1 gram/250 mL IVPB 1,000 mg      04/23  0844 IV ED 1 Time 04/22/20 2030        Antifungals (From admission, onward)    None        Antivirals (From admission, onward)    None           Immunization History   Administered Date(s) Administered    Influenza - Quadrivalent 09/13/2017, 09/21/2018    Influenza - Trivalent - PF (ADULT) 09/21/2015    PPD Test 10/04/2019       Family History     Problem Relation (Age of Onset)    Alcohol abuse Maternal Grandmother    Diabetes Brother, Maternal Grandfather    Early death Mother    Heart disease Father    Hyperlipidemia Father    Hypertension Father, Sister    Kidney disease Father        Social History     Socioeconomic History    Marital status:      Spouse name: Not on file    Number of children: Not on file    Years of education: Not on file    Highest education level: Not on file   Occupational History    Not on file   Social Needs    Financial resource strain: Not on file    Food insecurity:     Worry: Not on file     Inability: Not on file    Transportation needs:     Medical: Not on file     Non-medical: Not on file   Tobacco Use    Smoking status: Former Smoker     Packs/day: 1.00     Years: 10.00     Pack years: 10.00    Smokeless tobacco: Never Used   Substance and Sexual Activity    Alcohol use: No    Drug use: No    Sexual activity: Yes     Partners: Female     Birth control/protection: None   Lifestyle    Physical activity:     Days per week: Not on file     Minutes per session: Not on file    Stress: Not on file   Relationships    Social connections:     Talks on phone: Not on file     Gets together: Not on file     Attends Hindu service: Not on file     Active member of club or organization: Not on file     Attends meetings of clubs or organizations: Not on file     Relationship status: Not on file   Other Topics Concern    Not on file   Social History Narrative    Not on file     Review of Systems   Unable to perform ROS: Patient nonverbal     Objective:     Vital  Signs (Most Recent):  Temp: 97 °F (36.1 °C) (04/28/20 1428)  Pulse: 106 (04/28/20 1428)  Resp: 17 (04/28/20 1428)  BP: 112/69 (04/28/20 1428)  SpO2: 100 % (04/28/20 1428) Vital Signs (24h Range):  Temp:  [97 °F (36.1 °C)-101.5 °F (38.6 °C)] 97 °F (36.1 °C)  Pulse:  [] 106  Resp:  [12-20] 17  SpO2:  [96 %-100 %] 100 %  BP: (110-158)/() 112/69     Weight: 40 kg (88 lb 2.9 oz)  Body mass index is 14.23 kg/m².    Estimated Creatinine Clearance: 8.3 mL/min (A) (based on SCr of 5.7 mg/dL (H)).    Physical Exam   Constitutional:   Pt contracted   HENT:   Head: Normocephalic and atraumatic.   Cardiovascular: Normal rate, regular rhythm and normal heart sounds.   No murmur heard.  Pulmonary/Chest: Effort normal and breath sounds normal. He has no wheezes.   Abdominal: Soft. Bowel sounds are normal. He exhibits no distension. There is no tenderness.   Musculoskeletal: He exhibits deformity (L BKA).   Skin: Skin is warm. No rash noted. No erythema.       Significant Labs: All pertinent labs within the past 24 hours have been reviewed.    Significant Imaging: I have reviewed all pertinent imaging results/findings within the past 24 hours.

## 2020-04-28 NOTE — PROGRESS NOTES
"ESRD on iHD: notified by PRADEEP Mccray charge nurse yesterday evening of unable to complete yesterday's HD session; verbal orders to notify Primary Team for Vascular Surgery consult.   -noted Vascular Surgery consult  -labs drawn today "in process"  -plan for HD today after Vascular Surgery intervention  "

## 2020-04-28 NOTE — BRIEF OP NOTE
[facility]  Brief Operative Note    SUMMARY     Surgery Date: 4/28/2020     Surgeon(s) and Role:     * SHEA Alfonso III, MD - Primary    Assisting Surgeon: Woo Tompkins MD    Pre-op Diagnosis:  Thrombosis, r AVF    Post-op Diagnosis:  Same    PROCEDURES:    1. Perc declot (Possis) with PTA (7x40), R AVF  2. PTA, R subclavian vein (10x40)  3. Conscious Sedation    Anesthesia: Choice    Description of Procedure: Successful declot.  May use    Description of the findings of the procedure:  As above    Estimated Blood Loss: <4cc         Specimens:   Specimen (12h ago, onward)    None

## 2020-04-28 NOTE — ASSESSMENT & PLAN NOTE
Pt is a 54 y.o. male with HTN, intracranial bleed, ESRD on HD MWF,L BKA 2/2 osteomyelitis, history of DVT, history of heart failure, hx upper GI bleeds, PEG status, seizures, latent TB presented from [facility] for evaluation of shortness of breath.   Of note, pt recently hospitalized COVID positive.  Pt discharged afebrile w/o respiratory complaints.     Upon arrival, pt w/ elevated ferritin, procal, d-dimer, and elevated CRP.  Repeat Covid +.  CXR showed improved consolidations from his recent hospitalization.  Started on empiric Vanc and Zosyn initially.  Pt with hx of GI bleed was given blood as was anemic.  Pt seen by GI w/o plans for endoscopic evaluation.  Pt also found to have GPC in blood but later resulted in CONS in 1/4 bottles believed to be a contaminant.      Pt  found to have PE on CTA chest on 4/24 and US LE w/ partially occlusive thrombus, may be chronic.  Pt started on heparin drip after discussion with pt's family of the risk w/ pt's GI bleed hx. X Ray Chest 4/27 w/ near complete interval resolution of left basilar airspace opatcity and completer resolution of prior noted right basilar airspace opacity.    Pt unable to complete HD yesterday through AVF, seen by Vascular who will perform fistulogram.    ID consulted as pt w/ persistent F of unknown origin. tmax yesterday 102.5.  CRP remains elevated at 224  Repeat blood cxs 4/25 and 4/27 NGTD. Pt on RA.     Plan  -Would complete 7 day course of Zosyn for pneumonia (est end date 4/29/20)  -Suspect F a result of clots due to COVID 19 coagulopathy  -Monitor pt off of abx after Zosyn  -If worsens, would consider respiratory cultures.  -ID will sign off.  Please re-consult with any new infectious concerns.

## 2020-04-28 NOTE — PROGRESS NOTES
Hospital Medicine  History and Physical  [facility]      Patient Name: Vaughn Retana  MRN:  [id number]  Hospital Medicine Team: [facility] K Vania Calzada MD  Date of Admission:  4/22/2020     Length of Stay:  LOS: 6 days     Principal Problem: Suspected Covid-19 Virus Infection       History of Present Illness:    Mr. Vaughn Retana is a 54 y.o. male with hypertension, intracranial bleed, end-stage renal on dialysis MWF,L BKA 2/2 osteomyelitis, history of DVT, history of heart failure including pulmonary hypertension with normal EF (last 2017), hx upper GI bleeds, PEG status, seizures, latent TB presented from [facility] for evaluation of shortness of breath.  Patient isn't able to speak, so history is obtained via yes/no questions and ER documentation.  He denies any SOB, cough, fever, or chills and says he feels ok with no pain.  Of note, he just had an extensive hospitalization at [facility] from 3/28-4/15 for COVID.  At the end of his hospital course, he was transfused blood and decided to maintain a conservative approach so GI wasn't involved.  Additionally, he was DNR during the last hospital stay.    Upon arrival to the ER, vitals were temp 98.9F with HR 110s and satting 100% on 2L NC.  CXR showed improved consolidations from his recent hospitalization.  He was given Vanc and Zosyn and was admitted to Hospital Medicine for further management.    Interval History:  + fever again AM. Re-dosed vancomycin and zosyn--cultures remain unremarkable. Repeat CXR and abdominal film without obvious source. Vancomycin random level therapeutic. CT head completed 4/27 without any acute changes. Repeat Lactate < 2. Hg stable. No leukocytosis present. Troponin elevated but plateaued in setting of ESRD. PEG tube looks healthy-no signs of infection. Stage 2 sacral wound present but unchanged--does not appear necrotic and no discharge to indicate this as source.     S/p declot 4/28 with  vascular surgery. Have confirmed that dialysis will be scheduled for today and he can receive one UPRBC. Have stopped heparin gtt see conversation with family documented below for details--Hg drop in setting of recurrent GI bleeds. Discussed with family-decision to hold heparin at this time as they are more concerned about bleeding risk.     Previous Concern for G+ cocci in blood sample- but more likely contaminant as one bottle with cogulase negative staph. Continue on zosyn for possible PNA but CXR improved. Hg low on admission--GI has evaluated, Hg responded to 1 UPRBC and no plan for elective procedure at this time. D-dimer at 6 now 4-- concern for GI bleeding risk. Concern for another infectious process than covid at this time.       Review of Systems:  Unable to obtain 2/2 aphasia      Past Medical History: Patient has a past medical history of Amputation stump pain (9/10/2013), Aspiration pneumonia (7/27/2015), Asterixis (11/8/2016), C. difficile colitis (8/7/2015), Cholelithiasis without obstruction (8/25/2015), Chronic diastolic heart failure, Chronic low back pain (12/1/2015), Closed head injury (9/8/2016), DVT (deep venous thrombosis) (7/28/2017), ESRD on hemodialysis (2/7/2013), GERD (gastroesophageal reflux disease), HCV antibody positive, Hemiparesis affecting left side as late effect of stroke (11/08/2016), History of Intracerebral Hemorrhage: L BG 5/2013; R BG 9/2016; R BG 11/2016; L caudate head 2/2017 (11/2/2016), Hypertension, left basal ganglia ICH 5/2013 (11/2/2016), Left Caudate Head ICH 2/22/2017 (2/24/2017), 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      Past Surgical History: Patient has a past surgical history that includes R AVF (9/12/12); Leg amputation through knee (12/18/2013); Foot amputation through metatarsal; Colonoscopy; Colonoscopy (N/A, 4/4/2017); Esophagogastroduodenoscopy (N/A, 6/12/2018); Upper gastrointestinal endoscopy; Esophagogastroduodenoscopy (N/A, 3/7/2019); Esophagogastroduodenoscopy (N/A, 9/23/2019); Esophagogastroduodenoscopy (N/A, 10/2/2019); Esophagogastroduodenoscopy (N/A, 11/12/2019); and Esophagogastroduodenoscopy (N/A, 2/14/2020).      Social History: Patient reports that he has quit smoking. He has a 10.00 pack-year smoking history. He has never used smokeless tobacco. He reports that he does not drink alcohol or use drugs.      Family History: Patient's family history includes Alcohol abuse in his maternal grandmother; Diabetes in his brother and maternal grandfather; Early death in his mother; Heart disease in his father; Hyperlipidemia in his father; Hypertension in his father and sister; Kidney disease in his father.      Medications: Scheduled Meds:   sodium chloride 0.9%   Intravenous Once    sodium chloride 0.9%   Intravenous Once    albuterol  2 puff Inhalation Q6H    epoetin la nena-ebpx (RETACRIT) injection  50 Units/kg Intravenous Every Mon, Wed, Fri    levetiracetam oral soln  250 mg Oral BID    metoclopramide HCl  10 mg Oral QID    pantoprazole  40 mg Oral BID    piperacillin-tazobactam (ZOSYN) IVPB  4.5 g Intravenous Q12H    senna-docusate 8.6-50 mg  1 tablet Per G Tube BID    sevelamer carbonate  800 mg Oral TID WM     Continuous Infusions:    PRN Meds:.sodium chloride, sodium chloride 0.9%, sodium chloride 0.9%, sodium chloride 0.9%, acetaminophen, dextromethorphan-guaifenesin  mg/5 ml, Dextrose 10% Bolus, Dextrose 10% Bolus, glucagon (human recombinant), glucose, glucose, insulin aspart U-100, loperamide, melatonin, midodrine, ondansetron, sodium chloride 0.9%, Pharmacy to dose  Vancomycin consult **AND** vancomycin - pharmacy to dose      Allergies: Patient is allergic to fosrenol [lanthanum].      Physical Exam:    Temp:  [98.1 °F (36.7 °C)-101.5 °F (38.6 °C)]   Pulse:  []   Resp:  [12-20]   BP: (110-158)/()   SpO2:  [96 %-99 %]     Constitutional: Appears sickly  Head: Normocephalic and atraumatic.   Eyes: EOM are normal. Pupils are equal, round, and reactive to light. No scleral icterus.   Neck: Normal range of motion. Neck supple.   Cardiovascular: Normal heart rate.  Regular heart rhythm.  No murmur heard.  Pulmonary/Chest: Comfortable on 2L NC.  Coarse breath sounds throughout  Abdominal: Soft. Bowel sounds are normal.  No distension.  No tenderness  Musculoskeletal: Left BKA.  Right leg contractures  Neurological: Alert and making eye contact.  Not answering questions  Skin: Skin is warm and dry.   Psychiatric: Normal mood and affect. Behavior is normal.       Laboratory:  Recent Labs   Lab 04/27/20  0258 04/27/20  1039 04/28/20  0521   WBC 9.16 9.52 12.10   LYMPH 11.0*  1.0 11.4*  1.1 10.1*  1.2   HGB 8.0* 8.1* 7.4*   HCT 26.4* 26.1* 24.3*    233 220     Recent Labs   Lab 04/23/20  1145  04/26/20  0453 04/27/20  0258 04/27/20  1039 04/28/20  0521   NA  --    < > 137 133* 133* 132*   K  --    < > 3.6 4.3 4.3 5.0   CL  --    < > 100 94* 94* 91*   CO2  --    < > 29 25 26 26   BUN  --    < > 27* 59* 74* 94*   CREATININE  --    < > 2.5* 4.4* 4.9* 5.7*   GLU  --    < > 137* 303* 375* 243*   CALCIUM  --    < > 9.0 9.4 9.6 10.3   MG  --   --  1.9  --   --  2.2   PHOS 5.8*  --  1.1*  --   --  4.0    < > = values in this interval not displayed.     Recent Labs   Lab 04/27/20  0258 04/27/20  1039 04/28/20  0521   ALKPHOS 91 96 80   ALT 18 18 16   AST 39 36 28   ALBUMIN 1.6* 1.5* 1.4*   PROT 8.8* 8.6* 8.3   BILITOT 0.4 0.4 0.5      Recent Labs     04/27/20  1039 04/28/20  0521   DDIMER 3.48*  --    CRP  --  224.4*   TROPONINI 0.521*  --    LACTATE 1.6  --        All labs  within the last 24 hours were reviewed.     Microbiology:  Lab Results   Component Value Date    [id number] Positive (A) 04/22/2020       Microbiology Results (last 7 days)     Procedure Component Value Units Date/Time    Blood culture [[id number]] Collected:  04/27/20 1039    Order Status:  Completed Specimen:  Blood Updated:  04/28/20 1222     Blood Culture, Routine No Growth to date      No Growth to date    Narrative:       Collection has been rescheduled by SM3 at 04/27/2020 09:57 Reason:   Unable to collect  Collection has been rescheduled by 3 at 04/27/2020 10:03 Reason:   Unable to collect  Collection has been rescheduled by SM3 at 04/27/2020 10:04 Reason:   spoke with ISMAEL Roland  Collection has been rescheduled by 3 at 04/27/2020 09:57 Reason:   Unable to collect  Collection has been rescheduled by 3 at 04/27/2020 10:03 Reason:   Unable to collect  Collection has been rescheduled by SM3 at 04/27/2020 10:04 Reason:   spoke with SIMAEL Roland    Blood culture [[id number]] Collected:  04/25/20 0804    Order Status:  Completed Specimen:  Blood Updated:  04/28/20 1012     Blood Culture, Routine No Growth to date      No Growth to date      No Growth to date      No Growth to date    Narrative:       ADD-ON CRP #[id number] Per. mitch Calzada MD  04/25/2020  08:33     Blood culture x two cultures. Draw prior to antibiotics. [[id number]] Collected:  04/22/20 1849    Order Status:  Completed Specimen:  Blood from Peripheral, Antecubital, Left Updated:  04/26/20 2212     Blood Culture, Routine No Growth after 4 days.     Narrative:       Aerobic and anaerobic    Blood culture x two cultures. Draw prior to antibiotics. [[id number]]  (Abnormal) Collected:  04/22/20 1919    Order Status:  Completed Specimen:  Blood from Peripheral, Forearm, Left Updated:  04/26/20 0956     Blood Culture, Routine Gram stain miya bottle: Gram positive cocci in clusters resembling Staph       Results called to and read back by:Marco  Nic GUEVARA 04/24/2020  01:00      COAGULASE-NEGATIVE STAPHYLOCOCCUS SPECIES  Organism is a probable contaminant      Narrative:       Aerobic and anaerobic    Blood culture [[id number]]     Order Status:  Canceled Specimen:  Blood     Blood culture [[id number]]     Order Status:  Canceled Specimen:  Blood     Culture, Respiratory with Gram Stain [[id number]]     Order Status:  No result Specimen:  Sputum, Expectorated             Imaging  ECG Results          EKG 12-lead (Edited Result - FINAL)  Result time 04/23/20 12:54:59    Edited Result - FINAL by Interface, Lab In [facility] (04/23/20 12:54:59)                 Narrative:    Test Reason : R06.00,    Vent. Rate : 113 BPM     Atrial Rate : 113 BPM     P-R Int : 132 ms          QRS Dur : 094 ms      QT Int : 342 ms       P-R-T Axes : 082 067 083 degrees     QTc Int : 469 ms    Age and gender specific analysis  Sinus tachycardia  Incomplete right bundle branch block  Nonspecific T wave abnormality  Cannot exclude Lateral infarct  Abnormal ECG  When compared with ECG of 28-MAR-2020 13:40,  Left anterior fascicular block is no longer Present  lateral infarct is now present  Reconfirmed by KYLER AVERY MD ([id number]) on 4/23/2020 12:54:46 PM    Referred By: [email or web address]   SELF           Confirmed By:KYLER AVERY MD                              No results found for this or any previous visit.    Cardiac catheterization  Procedure performed in the Invasive Lab    - See Procedure Log link below for nursing documentation    - See OpNote on Surgeries Tab for physician findings   CT Head Without Contrast  Narrative: EXAMINATION:  CT HEAD WITHOUT CONTRAST    CLINICAL HISTORY:  Confusion/delirium, altered LOC, unexplained;    TECHNIQUE:  Low dose axial images were obtained through the head.  Coronal and sagittal reformations were also performed. Contrast was not administered.    COMPARISON:  February 8, 2020    FINDINGS:  Axial noncontrast CT examination of the brain was performed.   Axial imaging, sagittal and coronal reconstruction imaging is submitted.  The ventricular system and sulcal pattern demonstrate chronic involutional change, chronic appearing white matter change is noted with areas consistent with remote lacunar-type ischemic change noted, particular involving the basal ganglia bilaterally and the thalamus right greater than left.  Central basal ganglia calcifications/mineralization is noted.  There is no evidence for intracranial mass, mass effect or midline shift and there is no evidence for acute intracranial hemorrhage.  Appropriate CSF spaces are seen at the skull base.    The orbits appear intact.  The visualized mastoid air cells appear appropriate when accounting for averaging, minimal paranasal sinus mucosal thickening is noted.  The osseous structures demonstrate chronic change.  Impression: Chronic changes are noted, there is no evidence for superimposed acute intracranial process.    Electronically signed by: Vijay Taylor  Date:    04/28/2020  Time:    00:28      All imaging within the last 24 hours was reviewed.       Assessment and Plan:    Active Hospital Problems    Diagnosis  POA    *COVID-19 virus infection [U07.1]  Yes    Leukocytosis [D72.829]  Yes    Pulmonary embolism [I26.99]  Yes    Palliative care encounter [Z51.5]  Not Applicable    Advanced care planning/counseling discussion [Z71.89]  Not Applicable    Goals of care, counseling/discussion [Z71.89]  Not Applicable    ESRD on dialysis [N18.6, Z99.2]  Not Applicable    Pressure injury due to medical device [T85.898A, L89.90]  Yes    Seizure [R56.9]  Yes    Chronic blood loss anemia [D50.0]  Yes    Hemodialysis-associated hypotension [I95.3]  Yes    Severe malnutrition [E43]  Yes     Assessment and Plan  Severe Malnutrition in the context of Social/Environmental Circumstances     Related to (etiology):  Unknown etiology     Signs and Symptoms (as evidenced by):  Energy Intake: per pt, <75%  intake over the last year  Body Fat Depletion: overall subcutaneous fat loss, moderate triceps fat loss and protruding patellas.  Muscle Mass Depletion:  moderate muscle wasting of interosseous, temporal, clavicle, calves and quadriceps  Weight Loss: 24% (39 lbs) x 1 year    Recommendations     Recommendation/Intervention: 1. Should pt be cleared for po diet, recommend renal diet with texture per SLP along with Novasource ONS TID. 2. Should pt require enteral feeding, initiate Novasource renal formula at 10ml/hr and advance 10ml Q4hrs to goal rate of 40ml/hr (provides 1920kcal, 87g pro, 691ml free water). Provide additional 500ml water flush/24 hrs. Hold for residuals >500ml.  Goals: 1. Pt to receive nutrition < 48 hrs.      Erosive gastritis [K29.60]  Yes    Chronic upper GI bleeding [K92.2]  Yes    Chronic kidney disease-mineral and bone disorder [N18.9, E83.9, M89.9]  Yes     Chronic    Chronic diastolic heart failure [I50.32]  Yes     Chronic     2-23-17    1 - Low normal to mildly depressed left ventricular systolic function (EF 50-55%).     2 - Right ventricular enlargement with mildly depressed systolic function.     3 - Left ventricular diastolic dysfunction.     4 - Right atrial enlargement.     5 - Severe tricuspid regurgitation.     6 - Pulmonary hypertension. The estimated PA systolic pressure is 86 mmHg.     7 - Increased central venous pressure.       History of Intracerebral Hemorrhage: L BG 5/2013; R BG 9/2016; R BG 11/2016; L caudate head 2/2017 [Z86.79]  Not Applicable     Chronic    Acute respiratory failure with hypoxia [J96.01]  Yes    Stenosis of arteriovenous dialysis fistula [T82.858A]  No    Anemia in chronic kidney disease [N18.9, D63.1]  Yes     Chronic      Resolved Hospital Problems   No resolved problems to display.       Covid-19 Virus Infection  - COVID-19 testing: Positive on 3/25 and again on 4/22  - Isolation: Airborne/Droplet. Surgical mask on patient. Notify Infection  Control  - Diagnostics: Trend Q48hrs if stable, more frequently if patient decompensating.  Lymphopenia, hyponatremia, hyperferritinemia, elevated troponin, elevated d-dimer, age, and comorbidities are significant predictors of poor clinical outcome)  o CBC:  WBC 15  o CMP:  ESRD on HD  o Ferritin:    o D-dimer:    o CRP:  270  o BNP:    o CPK:  957  o LDH:  226  o Troponin:  0.439  o Procalcitonin:  5.39  o CXR:  Improving consolidations  o Blood culture x2 4/22/2020 NGTD  o Sputum culture 4/22/2020 NGTD    - Management: Per [facility] COVID Treatment Protocol (4/15/20)    - Monitoring:   - Telemetry & Continuous Pulse Oximetry    - Nutrition:    - Multivitamin PO daily   - Add Boost supplement   - Vitamin D 1000IU daily if deficient   - Ascorbic acid 500mg PO bid    - Supportive Care:   - acetaminophen 650mg PO Q6hr PRN fever/headache   - loperamide PRN viral diarrhea   - IVF if indicated, restrictive strategy preferred, no maintenance IV if able   - VTE PPx: enoxaparin or heparin SQ unless contraindicated    - Antibiotics:  - if indications, CXR findings, elevated procal. See protocol for alternatives.   - Discontinue early if low concern for bacterial co-infection   - Vanc/Zosyn given COVID and recent hospitalization    Acute Hypoxemic Respiratory Failure  Acute Respiratory Disease 2/2 COVID19  - Order RT consult via Respiratory Communication for COVID Protocols  - Order Incentive Spirometer Q4h  - Order Flutter Valve Q4h  - Continuous Pulse Oximetry  - Goal SpO2 92-96%  - Supplemental O2 via LFNC, VentiMask, or HFNC (see Respiratory Support Oxygen Therapies)  - If wheezing, albuterol INH Q6h scheduled & PRN  - Proning Protocol if patient is a candidate (see  Proning Protocol)   - GCS >13, able to self-prone  - If deterioration, may warrant trial of NIPPV and transfer to [facility] pressure room or immediate ICU consult  - CXR with multifocal opacities  - Satting 100% on 2L NC  - Continue Vanc/Zosyn given recent  hospitalization currently, discuss with ID as difficulty finding source. Maybe blood clots?     Chronic Upper GIB  Erosive Gastritis  · Reports of recurrent and chronic GIB  · Last EGD 2/14/20 with non-bleeding erosive gastropathy  · Continue PPI BID  · GI consult given severe drop in anemia, no plans for EGD currently  · 4/27: Hg 8 stable      Chronic Blood Loss Anemia  Anemia in CKD  · Hemoglobin 7.2 on admit with tachycardia  · Was 9 2 weeks ago at DC  · Type and screen ordered  · Transfused 1 unit PRBCs  · Repeat Hg this AM of 8 today    History of ICH  · 2013  · Resides at [facility]  · Repeat CT scan today, no overt neurological changes but patient appears more lethargic.     Chronic Diastolic Heart Failure  · Chronic and stable  · Not on BP medications    HD Associated Hypotension  · Chronic issue  · Continue Midodrine with HD MWF    CKD Mineral and Bone Disorder  ESRD on HD  · Chronic and stable  · Continue Renvela TID    Seizure Disorder  · Chronic and stable  · Continue Keppra 250mg BID  · Seizure precautions    Sacral Pressure Injury  · Wound Care consult    Lower Extremity DVT  - on heparin gtt currently  - due to high bleeding risk hesistant to commit patient to long term oral anticoagulation especially as this may be a chronic thrombus.     Rt lower lung Pulmonary embolism  - treatment heparing gtt.   - extensive discussion with family concerning risk/benefit of anticoagulation in history of recurrent UGI bleeding. We discussed issues that patient drop in Hg again 4/28. No hematemesis and no bowel movement today but remains concerning. They agree that they would like to hold the anticoagulation at this time until assess for GI bleeding.  Will monitor overnight but he may need to be restarted on heparin if Hg stable after transfusion.     Patient's chronic/stable medical conditions noted in the assessment above will be managed with the patient's home medications as tolerated.      Diet:  Renal  pleasure feeds, TF  VTE PPx:  Heparin BID  Goals of Care:  Full code based on paperwork from NH.  Was DNR on previous admission.    4/25: sister updated on phone at 2:40pm    4/27: updated sister on phone. We discussed the significant complexity of medical issues and that my recommendation would be to begin a transition to hospice capacity. She will discuss further with her brother but no decisions made yet. We have discussed continuing heparin gtt in hospital for today but long term my recommendation is to avoid anticoagulation as he has long history of life threatening recurent GI bleeding.  I was very straight forward that he may not recover from this hospitalization due to significant comorbidities, infection, pulmonary embolism, heart failure, ESRD, bed bound, dementia with severe neurological issues related to prior ICH/stroke and malnutrition with sacral stage 2 wound.     4/28: updated sister at 4/28. We discussed his declot and plans for dialysis. I informed her that he needs 1U PRBC today as his Hg dropped. She agrees with holding the heparin gtt tonight to ensure no evidence of bleeding before restarting potentially in AM.  (recurrent hospital admissions for hematemesis).     Vania Calzada

## 2020-04-28 NOTE — CONSULTS
VASCULAR SURGERY SERVICE  CONSULTATION NOTE    CHIEF COMPLAINT: poor AVF function    HISTORY OF PRESENT ILLNESS: Vaughn Retana is a 54 y.o. male admitted to medicine service for COVID19 infection. His history is significant for ESRD, HFpEF, DVT, ESRD, ICH. He has been dialyzing via a RUE radiocephalic AVF created at the elbow in 2012. He has required multiple interventions to maintain patency of the fistula. Yesterday, during HD, the dialysis machine developed high venous pressures and stopped flowing well. Vascular input is requested. He does not speak more than yes/no. He lives in [facility]. History is per chart review.    Past Medical History:   Diagnosis Date    Amputation stump pain 9/10/2013    Aspiration pneumonia 7/27/2015    Asterixis 11/8/2016    C. difficile colitis 8/7/2015    Cholelithiasis without obstruction 8/25/2015    Chronic diastolic heart failure     2-23-17   1 - Low normal to mildly depressed left ventricular systolic function (EF 50-55%).    2 - Right ventricular enlargement with mildly depressed systolic function.    3 - Left ventricular diastolic dysfunction.    4 - Right atrial enlargement.    5 - Severe tricuspid regurgitation.    6 - Pulmonary hypertension. The estimated PA systolic pressure is 86 mmHg.    7 - Increased central venous pressure.    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     Metabolic acidosis, IAG, reduced excretion of inorganic acids     Myoclonic jerking 9/20/2016    Noncompliance with medication regimen 12/4/2018    Secondary hyperparathyroidism (of renal origin)     Secondary pulmonary hypertension 3/23/2017    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    TB lung, latent 08/25/2015    Negative Quantiferon Gold 3-23-17       Past Surgical History:   Procedure Laterality Date    COLONOSCOPY      COLONOSCOPY N/A 4/4/2017    Procedure: COLONOSCOPY;  Surgeon: Walker Stern MD;  Location: [facility] (2ND FLR);  Service: Endoscopy;  Laterality: N/A;  PA Systolic Pressure 85.56. HD Patient MWF, K+ lab prior to procedure.     ESOPHAGOGASTRODUODENOSCOPY N/A 6/12/2018    Procedure: EGD (ESOPHAGOGASTRODUODENOSCOPY);  Surgeon: Man Galicia MD;  [facility]  Service: Endoscopy;  Laterality: N/A;  EGD in 8-12 weeks with Dr. Galicia on 4th floor for follow up erosive esophagitis and Mejia's surveillance.    Patient should be on Pantoprazole 40mg every 12 hours or the equivulant of another PPI    awaiting for patient to reply back regarding changing    ESOPHAGOGASTRODUODENOSCOPY N/A 3/7/2019    Procedure: EGD (ESOPHAGOGASTRODUODENOSCOPY);  Surgeon: Man Galicia MD;  [facility]  Service: Endoscopy;  Laterality: N/A;    ESOPHAGOGASTRODUODENOSCOPY N/A 9/23/2019    Procedure: EGD (ESOPHAGOGASTRODUODENOSCOPY);  Surgeon: Keanu Rainey MD;  [facility]  Service: Endoscopy;  Laterality: N/A;    ESOPHAGOGASTRODUODENOSCOPY N/A 10/2/2019    Procedure: EGD (ESOPHAGOGASTRODUODENOSCOPY);  Surgeon: Kevin De La Paz MD;  [facility]  Service: Endoscopy;  Laterality: N/A;    ESOPHAGOGASTRODUODENOSCOPY N/A 11/12/2019    Procedure: EGD (ESOPHAGOGASTRODUODENOSCOPY);  Surgeon: Kevin De La Paz MD;  [facility]  Service: Endoscopy;  Laterality: N/A;    ESOPHAGOGASTRODUODENOSCOPY N/A 2/14/2020     Procedure: EGD (ESOPHAGOGASTRODUODENOSCOPY);  Surgeon: Kevin De La Paz MD;  Location: [facility] ([address] [place] [address]);  Service: Endoscopy;  Laterality: N/A;    FOOT AMPUTATION THROUGH METATARSAL      left foot    LEG AMPUTATION THROUGH KNEE  12/18/2013    left BKA    R AVF  9/12/12    UPPER GASTROINTESTINAL ENDOSCOPY           Current Facility-Administered Medications:     0.9%  NaCl infusion, , Intravenous, PRN, Nayeli Orozco, FNP    0.9%  NaCl infusion, , Intravenous, PRN, Nayeli Orozco, FNP    0.9%  NaCl infusion, , Intravenous, Once, Nayeli Orozco, LILIANP    acetaminophen tablet 650 mg, 650 mg, Oral, Q4H PRN, Susan Turcios MD, 650 mg at 04/27/20 2327    albuterol inhaler 2 puff, 2 puff, Inhalation, Q6H, 2 puff at 04/28/20 0100 **AND** MDI Q6H, , , Q6H, Vania Calzada MD    dextromethorphan-guaifenesin  mg/5 ml liquid 10 mL, 10 mL, Oral, Q4H PRN, Susan Turcios MD, 10 mL at 04/27/20 0925    dextrose 10% (D10W) Bolus, 12.5 g, Intravenous, PRN, Susan Turcios MD    dextrose 10% (D10W) Bolus, 25 g, Intravenous, PRN, Susan Turcios MD    epoetin la nena-epbx injection 2,000 Units, 50 Units/kg, Intravenous, Every Mon, Wed, Fri, Nayeli Orozco, LILIANP    glucagon (human recombinant) injection 1 mg, 1 mg, Intramuscular, PRN, Susan Turcios MD    glucose chewable tablet 16 g, 16 g, Oral, PRN, Susan Turcios MD    glucose chewable tablet 24 g, 24 g, Oral, PRN, Susan Turcios MD    heparin 25,000 units in dextrose 5% 250 mL (100 units/mL) infusion LOW INTENSITY nomogram - [facility], 12 Units/kg/hr, Intravenous, Continuous, Vania Calzada MD, Last Rate: 7.6 mL/hr at 04/27/20 0400, 17 Units/kg/hr at 04/27/20 0400    insulin aspart U-100 pen 0-5 Units, 0-5 Units, Subcutaneous, Q6H PRN, Vania Calzada MD    levetiracetam oral soln Soln 250 mg, 250 mg, Oral, BID, Susan Turcios MD, 250 mg at 04/27/20 2100    loperamide capsule 2 mg, 2 mg, Oral, Q6H PRN, Susan Turcios,  MD, 2 mg at 04/23/20 2100    melatonin tablet 6 mg, 6 mg, Oral, Nightly PRN, Susan Turcios MD, 6 mg at 04/23/20 2100    metoclopramide HCl tablet 10 mg, 10 mg, Oral, QID, Susan Turcios MD, 10 mg at 04/27/20 2100    midodrine tablet 5 mg, 5 mg, Oral, PRN, Vania Calzada MD    ondansetron injection 4 mg, 4 mg, Intravenous, Q8H PRN, Susan Turcios MD    pantoprazole EC tablet 40 mg, 40 mg, Oral, BID, Susan Turcios MD, 40 mg at 04/27/20 2100    piperacillin-tazobactam 4.5 g in sodium chloride 0.9% 100 mL IVPB (ready to mix system), 4.5 g, Intravenous, Q12H, Susan Turcios MD, Last Rate: 25 mL/hr at 04/27/20 2100, 4.5 g at 04/27/20 2100    senna-docusate 8.6-50 mg per tablet 1 tablet, 1 tablet, Per G Tube, BID, Vania Calzada MD, 1 tablet at 04/27/20 2100    sevelamer carbonate tablet 800 mg, 800 mg, Oral, TID WM, Susan Turcios MD, 800 mg at 04/27/20 1749    sodium chloride 0.9% flush 10 mL, 10 mL, Intravenous, Q8H PRN, Susan Turcios MD    Pharmacy to dose Vancomycin consult, , , Once **AND** vancomycin - pharmacy to dose, , Intravenous, pharmacy to manage frequency, Vania Calzada MD    Review of patient's allergies indicates:   Allergen Reactions    Fosrenol [lanthanum] Nausea And Vomiting     Nausea and vomiting       Family History   Problem Relation Age of Onset    Early death Mother     Kidney disease Father     Hypertension Father     Heart disease Father     Hyperlipidemia Father     Diabetes Brother     Alcohol abuse Maternal Grandmother     Diabetes Maternal Grandfather     Hypertension Sister     Melanoma Neg Hx        Social History     Tobacco Use    Smoking status: Former Smoker     Packs/day: 1.00     Years: 10.00     Pack years: 10.00    Smokeless tobacco: Never Used   Substance Use Topics    Alcohol use: No    Drug use: No       REVIEW OF SYSTEMS:  Unable to obtain - patient non-verbal.    PHYSICAL EXAM:   /89   Pulse 98   Temp 98.9 °F (37.2  "°C) (Axillary)   Resp 14   Ht 5' 6" (1.676 m)   Wt 40 kg (88 lb 2.9 oz)   SpO2 99%   BMI 14.23 kg/m²   Constitutional:  Chronically ill appearing  Older than stated age  Non-verbal   Neurological: Right upper extremity held in flexion  Otherwise unable to participate in neuro exam   Psychiatric: Unable to participate in exam   HEENT: Normocephalic / atraumatic  PERRLA  Midline trachea  No scars across the neck   Cardiac: Regular rate and rhythm.   Pulmonary: Normal pulmonary effort.   Abdomen: Soft, not distended.    Skin: Warm and well perfused.    Extremities/  Musculoskeletal: Right upper extremity radiocephalic AVF with severe pulsatility  Brachial pulse 2+     DIAGNOSTICS:  Recent Labs     04/27/20  0258 04/27/20  1039 04/28/20  0521   WBC 9.16 9.52 12.10   HGB 8.0* 8.1* 7.4*   HCT 26.4* 26.1* 24.3*    233 220        Recent Labs     04/27/20  0258 04/27/20  1039 04/28/20  0521   * 133* 132*   K 4.3 4.3 5.0   CL 94* 94* 91*   CO2 25 26 26   BUN 59* 74* 94*   CREATININE 4.4* 4.9* 5.7*   CALCIUM 9.4 9.6 10.3   ANIONGAP 14 13 15   ESTGFRAFRICA 16.3* 14.3* 11.9*   EGFRNONAA 14.1* 12.3* 10.3*       Recent Labs     04/27/20  0258 04/27/20  1039 04/28/20  0521   ALT 18 18 16   AST 39 36 28   ALKPHOS 91 96 80   BILITOT 0.4 0.4 0.5           IMPRESSION & PLAN:  54 y.o. male with RUE RC AVF malfunction    Would benefit from fistulagram.    Keep NPO. Will try to fit in to cath lab schedule later today.    Redd Tompkins MD PGY6  Vascular and Endovascular Surgery Fellow  04/28/2020    "

## 2020-04-28 NOTE — PLAN OF CARE
Problem: Wound  Goal: Optimal Wound Healing  Outcome: Ongoing, Progressing     Problem: Fall Injury Risk  Goal: Absence of Fall and Fall-Related Injury  Outcome: Ongoing, Progressing     Problem: Adult Inpatient Plan of Care  Goal: Plan of Care Review  Outcome: Ongoing, Progressing  Goal: Patient-Specific Goal (Individualization)  Outcome: Ongoing, Progressing  Goal: Absence of Hospital-Acquired Illness or Injury  Outcome: Ongoing, Progressing  Goal: Optimal Comfort and Wellbeing  Outcome: Ongoing, Progressing  Goal: Readiness for Transition of Care  Outcome: Ongoing, Progressing  Goal: Rounds/Family Conference  Outcome: Ongoing, Progressing     Problem: Diabetes Comorbidity  Goal: Blood Glucose Level Within Desired Range  Outcome: Ongoing, Progressing     Problem: Skin Injury Risk Increased  Goal: Skin Health and Integrity  Outcome: Ongoing, Progressing     Problem: Device-Related Complication Risk (Hemodialysis)  Goal: Safe, Effective Therapy Delivery  Outcome: Ongoing, Progressing     Problem: Hemodynamic Instability (Hemodialysis)  Goal: Vital Signs Remain in Desired Range  Outcome: Ongoing, Progressing     Problem: Infection (Hemodialysis)  Goal: Absence of Infection Signs/Symptoms  Outcome: Ongoing, Progressing     Problem: Malnutrition  Goal: Improved Nutritional Intake  Outcome: Ongoing, Progressing     Problem: Coping Ineffective  Goal: Effective Coping  Outcome: Ongoing, Progressing

## 2020-04-28 NOTE — HPI
Pt is a 54 y.o. male with hypertension, intracranial bleed, ESRD on HD MWF,L BKA 2/2 osteomyelitis, history of DVT, history of heart failure, hx upper GI bleeds, PEG status, seizures, latent TB presented from [facility] for evaluation of shortness of breath.  Patient isn't able to speak more than yes/no, so history obtained via yes/no questions and ER documentation.  He denies any SOB, cough, fever, or chills.  Of note, pt recently hospitalized COVID positive.  Pt discharged afebrile w/o respiratory complaints.     Upon arrival, pt w/ elevated ferritin, procal, d-dimer, and elevated CRP.  Repeat Covid +.  CXR showed improved consolidations from his recent hospitalization.  Started on empiric Vanc and Zosyn initially.  Pt with hx of GI bleed was given blood as was anemic.  Pt seen by GI w/o plans for endoscopic evaluation.  Pt also found to have GPC in blood but later resulted in CONS in 1/4 bottles believed to be a contaminant.      Pt also found to have on CTA chest to have PE seen on 4/24 and.  US LE w/ partially occlusive thrombus, may be chronic.  Pt started on heparin drip after discussion with pt's family of the risk w/ pt's GI bleed hx. X Ray Chest 4/27 w/ near complete interval resolution of left basilar airspace opatcity and completer resolution of prior noted right basilar airspace opacity.    Pt unable to complete HD yesterday through AVF, seen by Vascular who will perform fistulogram.    ID consulted as pt w/ persistent F of unknown origin. tmax yesterday 102.5.  CRP remains elevated at 224  Repeat blood cxs 4/25 and 4/27 NGTD. Pt on RA.

## 2020-04-28 NOTE — NURSING
SPOKE WITH DR. TANG NOTIFIED OF PATIENT PTT AND ASK IF SHE WANT TO PLACED PT HEPARIN DRIP ON HOLD UNTIL AFTER PT POSSIBLE FISTULAGRAM AFTER VASCULAR SEE'S PT DR. TANG STATES OK TO PT HEPARIN DRIP ON HOLD FOR NOW.

## 2020-04-28 NOTE — PROGRESS NOTES
Palliative Medicine Covid 19 Goals         Impression:  Pt is a 53 y/o gentleman with PMH of hypertension, intracranial bleed, end-stage renal on dialysis MWF,L BKA 2/2 osteomyelitis, history of DVT, CHF, pulmonary hypertension, upper GI bleeds, PEG placement,  Seizures and  latent TB.  He presented to [facility] [place] [place] from  from [facility] [place] for evaluation of shortness of breath. Extensive hospitalization at [facility] [place] [place] from 3/28-4/15 for COVID and GI bleed. Received blood transfusion and conservative medical management.   Pt is Full code.      Pt on room air sating 98-99%        Advance Care Planning   Advance Care Planning /Goals of care      Living Will: no  LaPOST: no  Do Not Resuscitate Status: no  Medical Power of : no  Next of kin for medical decision making- Pt's sister Vaishnavi Retana     Decision-Making Capacity: patient is unable to answer questions   Next of kin for medical decisions:      Goals of Care:     4/28/20: Called and spoke to pt's sister, Vaishnavi Retana. Per sister, she spoke to Dr. Calzada yesterday and is aware of how sick pt is due to multiple medical issues. Per pt's sister, hospice care had been discussed. She is aware pt may not recover back to baseline from this hospitalization.  She is still discussing options with her family.     Will continue to reach out to pt's sister for continued goals of care conversations.      4/27/20  Today: Called and spoke to pt's sister, Vaishnavi Retana. Sister is aware of pt's multiple medical issues.  Pt's sister wants pt to continue to recive aggressive care- HD, hospital visits etc.      Spoke to sister about care planning as pt continued to decline. Comfort care discussed as an option if pt declines mores and unable to tolerate treatment.      Ultimately, pt sister wants him to return to NH and continue HD. She wants pt to remain full code at this time but opened to continued discussions if pt sharply declines in hospital.      Support given  to sister who expresses concern for pt.      4-  - Unable to have conversation with Mr. Retana.  Spoke with his sister Vaishnavi Retana.  - Provided Ms. Retana with clinical updates and discussed current plan of care. -  - Discussed concern that his nutritional status is poor which can influence his chances of continued recovery from COVID infection.  Has PEG and tube feedings.  Speech following.  - Family amenable to dietary changes as necessary.  - Ms. Retana states patient was doing well and feels the nursing home may have been to quick to send back to hospital.  She reports Mr. Retana often has weakness and shortness of breath after his HD  - Family is not amenable to DNR.  She agreed to this last admit because he was so sick.    - Explained with COVID 19 patient status may decline rapidly.  Ms. Retana is amenable to further discussion if he declines        Following goals established  · Continue current plan of care  · Family amenable to speech pathology and additional swallow study   · Family would like to receive regular updates and discuss need for escalation of care  · Not amenable to DNR         Recommendations:   · Continue current plan of care  · Discharge back to [facility] when discharge criteria met.     If pt declines further, sister open to continued goals of care talks.      18 minutes spent with advance care planning.

## 2020-04-28 NOTE — SUBJECTIVE & OBJECTIVE
Past Medical History:   Diagnosis Date    Amputation stump pain 9/10/2013    Aspiration pneumonia 7/27/2015    Asterixis 11/8/2016    C. difficile colitis 8/7/2015    Cholelithiasis without obstruction 8/25/2015    Chronic diastolic heart failure     2-23-17   1 - Low normal to mildly depressed left ventricular systolic function (EF 50-55%).    2 - Right ventricular enlargement with mildly depressed systolic function.    3 - Left ventricular diastolic dysfunction.    4 - Right atrial enlargement.    5 - Severe tricuspid regurgitation.    6 - Pulmonary hypertension. The estimated PA systolic pressure is 86 mmHg.    7 - Increased central venous pressure.    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    Metabolic acidosis, IAG, reduced excretion of inorganic acids     Myoclonic jerking 9/20/2016    Noncompliance with medication regimen 12/4/2018    Secondary hyperparathyroidism (of renal origin)     Secondary pulmonary hypertension 3/23/2017    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    TB lung, latent 08/25/2015    Negative Quantiferon Gold 3-23-17       Past Surgical History:   Procedure Laterality Date    COLONOSCOPY      COLONOSCOPY N/A 4/4/2017    Procedure: COLONOSCOPY;  Surgeon: Walker Stern MD;  Location: [facility] ([address] [place] [address]);  Service: Endoscopy;  Laterality: N/A;  PA Systolic Pressure 85.56. HD Patient MWF, K+ lab  prior to procedure.     ESOPHAGOGASTRODUODENOSCOPY N/A 6/12/2018    Procedure: EGD (ESOPHAGOGASTRODUODENOSCOPY);  Surgeon: Man Galicia MD;  Location: [facility] ([facility]);  Service: Endoscopy;  Laterality: N/A;  EGD in 8-12 weeks with Dr. Galicia on 4th floor for follow up erosive esophagitis and Mejia's surveillance.    Patient should be on Pantoprazole 40mg every 12 hours or the equivulant of another PPI    awaiting for patient to reply back regarding changing    ESOPHAGOGASTRODUODENOSCOPY N/A 3/7/2019    Procedure: EGD (ESOPHAGOGASTRODUODENOSCOPY);  Surgeon: Man Galicia MD;  [facility]  Service: Endoscopy;  Laterality: N/A;    ESOPHAGOGASTRODUODENOSCOPY N/A 9/23/2019    Procedure: EGD (ESOPHAGOGASTRODUODENOSCOPY);  Surgeon: Kaenu Rainey MD;  [facility]  Service: Endoscopy;  Laterality: N/A;    ESOPHAGOGASTRODUODENOSCOPY N/A 10/2/2019    Procedure: EGD (ESOPHAGOGASTRODUODENOSCOPY);  Surgeon: Kevin De La Paz MD;  [facility]  Service: Endoscopy;  Laterality: N/A;    ESOPHAGOGASTRODUODENOSCOPY N/A 11/12/2019    Procedure: EGD (ESOPHAGOGASTRODUODENOSCOPY);  Surgeon: Kevin De La Paz MD;  [facility]  Service: Endoscopy;  Laterality: N/A;    ESOPHAGOGASTRODUODENOSCOPY N/A 2/14/2020    Procedure: EGD (ESOPHAGOGASTRODUODENOSCOPY);  Surgeon: Kevin De La Paz MD;  [facility]  Service: Endoscopy;  Laterality: N/A;    FOOT AMPUTATION THROUGH METATARSAL      left foot    LEG AMPUTATION THROUGH KNEE  12/18/2013    left BKA    R AVF  9/12/12    UPPER GASTROINTESTINAL ENDOSCOPY         Review of patient's allergies indicates:   Allergen Reactions    Fosrenol [lanthanum] Nausea And Vomiting     Nausea and vomiting       Medications:  Medications Prior to Admission   Medication Sig    acetaminophen (TYLENOL) 325 MG tablet Take 2 tablets (650 mg total) by mouth every 8 (eight) hours as needed.    folic  acid/vit B complex and C (RENAL VITAMIN ORAL) Take by mouth.    hydrALAZINE (APRESOLINE) 50 MG tablet Take 0.5 tablets (25 mg total) by mouth every 8 (eight) hours as needed (for SBP > 170).    levETIRAcetam (KEPPRA) 100 mg/mL Soln Take 2.5 mLs (250 mg total) by mouth 2 (two) times daily.    metoclopramide HCl (REGLAN) 10 MG tablet Take 10 mg by mouth 4 (four) times daily.    midodrine (PROAMATINE) 5 MG Tab Take 1 tablet (5 mg total) by mouth every Mon, Wed, Fri. Please take 30 min before dialysis on Monday Wednesday and Friday as needed    ondansetron (ZOFRAN) 8 MG tablet Take 1 tablet (8 mg total) by mouth every 12 (twelve) hours as needed for Nausea.    pantoprazole (PROTONIX) 40 MG tablet Take 1 tablet (40 mg total) by mouth 2 (two) times daily.    promethazine (PHENERGAN) 25 MG tablet Take 1 tablet (25 mg total) by mouth every 6 (six) hours as needed for Nausea.    RENAPLEX-D 800 mcg-12.5 mg -2,000 unit Tab Take 1 tablet by mouth once daily.    senna (SENOKOT) 8.6 mg tablet Take 2 tablets by mouth once daily.     sevelamer carbonate (RENVELA) 800 mg Tab Take 1 tablet (800 mg total) by mouth 3 (three) times daily with meals.     Antibiotics (From admission, onward)    Start     Stop Route Frequency Ordered    04/27/20 1429  vancomycin - pharmacy to dose  (vancomycin with pharmacy to dose consult)      -- IV pharmacy to manage frequency 04/27/20 1330    04/23/20 0900  piperacillin-tazobactam 4.5 g in sodium chloride 0.9% 100 mL IVPB (ready to mix system)      -- IV Every 12 hours (non-standard times) 04/22/20 2155    04/22/20 2045  vancomycin in dextrose 5 % 1 gram/250 mL IVPB 1,000 mg      04/23 0844 IV ED 1 Time 04/22/20 2030        Antifungals (From admission, onward)    None        Antivirals (From admission, onward)    None           Immunization History   Administered Date(s) Administered    Influenza - Quadrivalent 09/13/2017, 09/21/2018    Influenza - Trivalent - PF (ADULT) 09/21/2015    PPD  Test 10/04/2019       Family History     Problem Relation (Age of Onset)    Alcohol abuse Maternal Grandmother    Diabetes Brother, Maternal Grandfather    Early death Mother    Heart disease Father    Hyperlipidemia Father    Hypertension Father, Sister    Kidney disease Father        Social History     Socioeconomic History    Marital status:      Spouse name: Not on file    Number of children: Not on file    Years of education: Not on file    Highest education level: Not on file   Occupational History    Not on file   Social Needs    Financial resource strain: Not on file    Food insecurity:     Worry: Not on file     Inability: Not on file    Transportation needs:     Medical: Not on file     Non-medical: Not on file   Tobacco Use    Smoking status: Former Smoker     Packs/day: 1.00     Years: 10.00     Pack years: 10.00    Smokeless tobacco: Never Used   Substance and Sexual Activity    Alcohol use: No    Drug use: No    Sexual activity: Yes     Partners: Female     Birth control/protection: None   Lifestyle    Physical activity:     Days per week: Not on file     Minutes per session: Not on file    Stress: Not on file   Relationships    Social connections:     Talks on phone: Not on file     Gets together: Not on file     Attends Oriental orthodox service: Not on file     Active member of club or organization: Not on file     Attends meetings of clubs or organizations: Not on file     Relationship status: Not on file   Other Topics Concern    Not on file   Social History Narrative    Not on file     Review of Systems   Unable to perform ROS: Patient nonverbal     Objective:     Vital Signs (Most Recent):  Temp: 97 °F (36.1 °C) (04/28/20 1428)  Pulse: 106 (04/28/20 1428)  Resp: 17 (04/28/20 1428)  BP: 112/69 (04/28/20 1428)  SpO2: 100 % (04/28/20 1428) Vital Signs (24h Range):  Temp:  [97 °F (36.1 °C)-101.5 °F (38.6 °C)] 97 °F (36.1 °C)  Pulse:  [] 106  Resp:  [12-20] 17  SpO2:  [96  %-100 %] 100 %  BP: (110-158)/() 112/69     Weight: 40 kg (88 lb 2.9 oz)  Body mass index is 14.23 kg/m².    Estimated Creatinine Clearance: 8.3 mL/min (A) (based on SCr of 5.7 mg/dL (H)).    Physical Exam   Constitutional:   Pt contracted   HENT:   Head: Normocephalic and atraumatic.   Cardiovascular: Normal rate, regular rhythm and normal heart sounds.   No murmur heard.  Pulmonary/Chest: Effort normal and breath sounds normal. He has no wheezes.   Abdominal: Soft. Bowel sounds are normal. He exhibits no distension. There is no tenderness.   Musculoskeletal: He exhibits deformity (L BKA).   Skin: Skin is warm. No rash noted. No erythema.       Significant Labs: All pertinent labs within the past 24 hours have been reviewed.    Significant Imaging: I have reviewed all pertinent imaging results/findings within the past 24 hours.

## 2020-04-28 NOTE — CARE UPDATE
Rapid Response Nurse Chart Check     Chart check completed, abnormal VS noted from prior shift.  bedside RNJose contacted, no concerns verbalized at this time. RN updated vitals - T improved to 98.9 F. BP WNL given sepsis concern. Instructed to call [phone] for further concerns or assistance.

## 2020-04-29 NOTE — SUBJECTIVE & OBJECTIVE
Interval History: Nephrology consulted for ESRD Management.    Review of patient's allergies indicates:   Allergen Reactions    Fosrenol [lanthanum] Nausea And Vomiting     Nausea and vomiting     Current Facility-Administered Medications   Medication Frequency    0.9%  NaCl infusion (for blood administration) Q24H PRN    0.9%  NaCl infusion PRN    0.9%  NaCl infusion PRN    0.9%  NaCl infusion PRN    acetaminophen tablet 650 mg Q4H PRN    albuterol inhaler 2 puff Q6H    bisacodyL suppository 10 mg Once    dextromethorphan-guaifenesin  mg/5 ml liquid 10 mL Q4H PRN    dextrose 10% (D10W) Bolus PRN    dextrose 10% (D10W) Bolus PRN    epoetin la nena-epbx injection 2,000 Units Every Mon, Wed, Fri    glucagon (human recombinant) injection 1 mg PRN    glucose chewable tablet 16 g PRN    glucose chewable tablet 24 g PRN    heparin 25,000 units in dextrose 5% (100 units/ml) IV bolus from bag - ADDITIONAL PRN BOLUS - 30 units/kg PRN    heparin 25,000 units in dextrose 5% (100 units/ml) IV bolus from bag - ADDITIONAL PRN BOLUS - 60 units/kg PRN    heparin 25,000 units in dextrose 5% (100 units/ml) IV bolus from bag INITIAL BOLUS Once    heparin 25,000 units in dextrose 5% 250 mL (100 units/mL) infusion LOW INTENSITY nomogram - [facility] Continuous    insulin aspart U-100 pen 0-5 Units Q6H PRN    levetiracetam oral soln Soln 250 mg BID    melatonin tablet 6 mg Nightly PRN    metoclopramide HCl tablet 5 mg QID    midodrine tablet 5 mg PRN    ondansetron injection 4 mg Q8H PRN    pantoprazole injection 40 mg Q12H    piperacillin-tazobactam 4.5 g in sodium chloride 0.9% 100 mL IVPB (ready to mix system) Q12H    senna-docusate 8.6-50 mg per tablet 1 tablet BID    sevelamer carbonate tablet 800 mg TID WM    sodium chloride 0.9% flush 10 mL Q8H PRN    vancomycin - pharmacy to dose pharmacy to manage frequency       Objective:     Vital Signs (Most Recent):  Temp: 98.8 °F (37.1 °C) (04/29/20 1100)  Pulse:  (!) 121 (04/29/20 0823)  Resp: 17 (04/29/20 0823)  BP: (!) 102/59 (04/29/20 0823)  SpO2: 99 % (04/29/20 0823)  O2 Device (Oxygen Therapy): room air (04/29/20 0739) Vital Signs (24h Range):  Temp:  [96.4 °F (35.8 °C)-98.8 °F (37.1 °C)] 98.8 °F (37.1 °C)  Pulse:  [] 121  Resp:  [12-23] 17  SpO2:  [97 %-100 %] 99 %  BP: ()/() 102/59     Weight: 40 kg (88 lb 2.9 oz) (04/27/20 0400)  Body mass index is 14.23 kg/m².  Body surface area is 1.36 meters squared.    I/O last 3 completed shifts:  In: 1140 [Other:950; NG/GT:190]  Out: 948 [Other:948]    Physical Exam   Constitutional:   PE deferred to Primary Team d/t COVID-19 precautions; reviewed 4/29/20 Progress Note by Dr. Calzada.   Nursing note and vitals reviewed.      Significant Labs:  All labs within the past 24 hours have been reviewed.     Significant Imaging:  Labs: Reviewed

## 2020-04-29 NOTE — PROGRESS NOTES
Pharmacokinetic Assessment Follow Up: IV Vancomycin    Vancomycin serum concentration assessment/plan:  ? Random level today = 19.8 mcg/mL; goal 15-20, lower respiratory infection  ? ESRD on HD; patient received HD yesterday after fistulagram   ? Hold vancomycin today  ? Follow nephro plan and re-dose after HD  ? Next level to be drawn 4/30 at 05:00    Drug levels (last 3 results):  Recent Labs   Lab Result Units 04/27/20  0258 04/28/20  0521 04/29/20  0445   Vancomycin, Random ug/mL 35.4 29.1 19.8       Pharmacy will continue to follow and monitor vancomycin.    Please contact pharmacy at extension [phone] for questions regarding this assessment.    Thank you for the consult,   Dahiana Ramos       Patient brief summary:  Vaughn Retana is a 54 y.o. male initiated on antimicrobial therapy with IV Vancomycin for treatment of LRTI    Drug Allergies:   Review of patient's allergies indicates:   Allergen Reactions    Fosrenol [lanthanum] Nausea And Vomiting     Nausea and vomiting       Actual Body Weight:   40 kg    Renal Function:   Estimated Creatinine Clearance: 15.2 mL/min (A) (based on SCr of 3.1 mg/dL (H)).,     Dialysis Method (if applicable):  iHD    CBC (last 72 hours):  Recent Labs   Lab Result Units 04/27/20  0258 04/27/20  1039 04/28/20  0521 04/29/20  0714   WBC K/uL 9.16 9.52 12.10 9.62   Hemoglobin g/dL 8.0* 8.1* 7.4* 8.1*   Hematocrit % 26.4* 26.1* 24.3* 26.6*   Platelets K/uL 246 233 220 247   Gran% % 74.8* 76.7* 76.2* 75.5*   Lymph% % 11.0* 11.4* 10.1* 9.1*   Mono% % 11.6 9.3 10.1 11.6   Eosinophil% % 1.4 1.3 2.4 1.2   Basophil% % 0.2 0.2 0.2 0.5   Differential Method  Automated Automated Automated Automated       Metabolic Panel (last 72 hours):  Recent Labs   Lab Result Units 04/27/20  0258 04/27/20  1039 04/28/20  0521 04/29/20  0714   Sodium mmol/L 133* 133* 132* 136   Potassium mmol/L 4.3 4.3 5.0 4.0   Chloride mmol/L 94* 94* 91* 98   CO2 mmol/L 25 26 26 25   Glucose mg/dL 303* 375* 243* 230*    BUN, Bld mg/dL 59* 74* 94* 46*   Creatinine mg/dL 4.4* 4.9* 5.7* 3.1*   Albumin g/dL 1.6* 1.5* 1.4* 1.4*   Total Bilirubin mg/dL 0.4 0.4 0.5 1.0   Alkaline Phosphatase U/L 91 96 80 90   AST U/L 39 36 28 210*   ALT U/L 18 18 16 50*   Magnesium mg/dL  --   --  2.2 1.9   Phosphorus mg/dL  --   --  4.0 2.9       Vancomycin Administrations:  vancomycin given in the last 96 hours      No antibiotic orders with administrations found.                Microbiologic Results:  Microbiology Results (last 7 days)     Procedure Component Value Units Date/Time    Blood culture [[id number]] Collected:  04/25/20 0804    Order Status:  Completed Specimen:  Blood Updated:  04/29/20 1012     Blood Culture, Routine No Growth after 4 days.     Narrative:       ADD-ON CRP #[id number] Per. mitch Calzada MD  04/25/2020  08:33     Blood culture [[id number]] Collected:  04/27/20 1039    Order Status:  Completed Specimen:  Blood Updated:  04/28/20 1222     Blood Culture, Routine No Growth to date      No Growth to date    Narrative:       Collection has been rescheduled by 3 at 04/27/2020 09:57 Reason:   Unable to collect  Collection has been rescheduled by 3 at 04/27/2020 10:03 Reason:   Unable to collect  Collection has been rescheduled by SM3 at 04/27/2020 10:04 Reason:   spoke with ISMAEL Roland  Collection has been rescheduled by 3 at 04/27/2020 09:57 Reason:   Unable to collect  Collection has been rescheduled by SM3 at 04/27/2020 10:03 Reason:   Unable to collect  Collection has been rescheduled by SM3 at 04/27/2020 10:04 Reason:   spoke with ISMAEL Roland    Blood culture x two cultures. Draw prior to antibiotics. [[id number]] Collected:  04/22/20 1849    Order Status:  Completed Specimen:  Blood from Peripheral, Antecubital, Left Updated:  04/26/20 2212     Blood Culture, Routine No Growth after 4 days.     Narrative:       Aerobic and anaerobic    Blood culture x two cultures. Draw prior to antibiotics. [[id number]]  (Abnormal)  Collected:  04/22/20 1919    Order Status:  Completed Specimen:  Blood from Peripheral, Forearm, Left Updated:  04/26/20 0956     Blood Culture, Routine Gram stain miya bottle: Gram positive cocci in clusters resembling Staph       Results called to and read back by:Marco Lucero RN 04/24/2020  01:00      COAGULASE-NEGATIVE STAPHYLOCOCCUS SPECIES  Organism is a probable contaminant      Narrative:       Aerobic and anaerobic    Blood culture [[id number]]     Order Status:  Canceled Specimen:  Blood     Blood culture [[id number]]     Order Status:  Canceled Specimen:  Blood     Culture, Respiratory with Gram Stain [[id number]]     Order Status:  No result Specimen:  Sputum, Expectorated

## 2020-04-29 NOTE — PROGRESS NOTES
"[facility]  Vascular Surgery  Progress Note    Patient Name: Vaughn Retana  MRN: [id number]  Admission Date: 4/22/2020  Primary Care Provider: Cori Randall MD    Subjective:     Interval History: S/p Declot yesterday. No acute events overnight. Tolerated HD with no issues.     Post-Op Info:  Procedure(s) (LRB):  Fistulogram (Right)  PTA, Fistula  Declot, Vascular Graft (N/A)   1 Day Post-Op       Medications:  Continuous Infusions:  Scheduled Meds:   albuterol  2 puff Inhalation Q6H    bisacodyL  10 mg Rectal Once    epoetin la nena-ebpx (RETACRIT) injection  50 Units/kg Intravenous Every Mon, Wed, Fri    levetiracetam oral soln  250 mg Per G Tube BID    metoclopramide HCl  5 mg Per G Tube QID    pantoprozole (PROTONIX) IV  40 mg Intravenous Q12H    piperacillin-tazobactam (ZOSYN) IVPB  4.5 g Intravenous Q12H    senna-docusate 8.6-50 mg  1 tablet Per G Tube BID    sevelamer carbonate  800 mg Per G Tube TID WM    sodium chloride 0.9%  500 mL Intravenous Once     PRN Meds:sodium chloride, sodium chloride 0.9%, sodium chloride 0.9%, sodium chloride 0.9%, acetaminophen, dextromethorphan-guaifenesin  mg/5 ml, Dextrose 10% Bolus, Dextrose 10% Bolus, glucagon (human recombinant), glucose, glucose, insulin aspart U-100, melatonin, midodrine, ondansetron, sodium chloride 0.9%, Pharmacy to dose Vancomycin consult **AND** vancomycin - pharmacy to dose     Objective:     Vital Signs (Most Recent):  Temp: 98.5 °F (36.9 °C) (04/29/20 0039)  Pulse: (!) 123 (04/29/20 0039)  Resp: 12 (04/29/20 0039)  BP: (!) 90/59 (04/29/20 0000)  SpO2: 100 % (04/28/20 1428) Vital Signs (24h Range):  Temp:  [96.4 °F (35.8 °C)-98.5 °F (36.9 °C)] 98.5 °F (36.9 °C)  Pulse:  [] 123  Resp:  [12-23] 12  SpO2:  [99 %-100 %] 100 %  BP: ()/() 90/59       PHYSICAL EXAM:   /89   Pulse 98   Temp 98.9 °F (37.2 °C) (Axillary)   Resp 14   Ht 5' 6" (1.676 m)   Wt 40 kg (88 lb 2.9 oz)   SpO2 99%   BMI " 14.23 kg/m²   Constitutional:  Chronically ill appearing  Older than stated age  Non-verbal   Neurological: Right upper extremity held in flexion  Otherwise unable to participate in neuro exam   Psychiatric: Unable to participate in exam   HEENT: Normocephalic / atraumatic  PERRLA  Midline trachea  No scars across the neck   Cardiac: Regular rate and rhythm.   Pulmonary: Normal pulmonary effort.   Abdomen: Soft, not distended.        Extremities/  Musculoskeletal: Right upper extremity radiocephalic AVF with severe pulsatility  Brachial pulse 2+       Significant Labs:  Cardiac markers:   Recent Labs   Lab 04/27/20  1039   TROPONINI 0.521*     CBC:   Recent Labs   Lab 04/28/20  0521   WBC 12.10   RBC 2.59*   HGB 7.4*   HCT 24.3*      MCV 94   MCH 28.6   MCHC 30.5*     CMP:   Recent Labs   Lab 04/28/20  0521   *   CALCIUM 10.3   ALBUMIN 1.4*   PROT 8.3   *   K 5.0   CO2 26   CL 91*   BUN 94*   CREATININE 5.7*   ALKPHOS 80   ALT 16   AST 28   BILITOT 0.5     Coagulation:   Recent Labs   Lab 04/28/20  0521   APTT 80.1*         Assessment/Plan:     Stenosis of arteriovenous dialysis fistula  54 y.o. male with RUE RC AVF malfunction s/p declot 4/28     Patient with successful HD session   Please call with further questions or concerns         Vania Curran MD  Vascular Surgery  [facility]

## 2020-04-29 NOTE — ASSESSMENT & PLAN NOTE
54 y.o. male with RUE RC AVF malfunction s/p declot 4/28     Patient with successful HD session   Please call with further questions or concerns

## 2020-04-29 NOTE — NURSING
HD txt completed with no problems, no UF removed, 1 unit of PRBCs given while on HD. Report given to primary nurse

## 2020-04-29 NOTE — SUBJECTIVE & OBJECTIVE
"  Medications:  Continuous Infusions:  Scheduled Meds:   albuterol  2 puff Inhalation Q6H    bisacodyL  10 mg Rectal Once    epoetin la nena-ebpx (RETACRIT) injection  50 Units/kg Intravenous Every Mon, Wed, Fri    levetiracetam oral soln  250 mg Per G Tube BID    metoclopramide HCl  5 mg Per G Tube QID    pantoprozole (PROTONIX) IV  40 mg Intravenous Q12H    piperacillin-tazobactam (ZOSYN) IVPB  4.5 g Intravenous Q12H    senna-docusate 8.6-50 mg  1 tablet Per G Tube BID    sevelamer carbonate  800 mg Per G Tube TID WM    sodium chloride 0.9%  500 mL Intravenous Once     PRN Meds:sodium chloride, sodium chloride 0.9%, sodium chloride 0.9%, sodium chloride 0.9%, acetaminophen, dextromethorphan-guaifenesin  mg/5 ml, Dextrose 10% Bolus, Dextrose 10% Bolus, glucagon (human recombinant), glucose, glucose, insulin aspart U-100, melatonin, midodrine, ondansetron, sodium chloride 0.9%, Pharmacy to dose Vancomycin consult **AND** vancomycin - pharmacy to dose     Objective:     Vital Signs (Most Recent):  Temp: 98.5 °F (36.9 °C) (04/29/20 0039)  Pulse: (!) 123 (04/29/20 0039)  Resp: 12 (04/29/20 0039)  BP: (!) 90/59 (04/29/20 0000)  SpO2: 100 % (04/28/20 1428) Vital Signs (24h Range):  Temp:  [96.4 °F (35.8 °C)-98.5 °F (36.9 °C)] 98.5 °F (36.9 °C)  Pulse:  [] 123  Resp:  [12-23] 12  SpO2:  [99 %-100 %] 100 %  BP: ()/() 90/59       PHYSICAL EXAM:   /89   Pulse 98   Temp 98.9 °F (37.2 °C) (Axillary)   Resp 14   Ht 5' 6" (1.676 m)   Wt 40 kg (88 lb 2.9 oz)   SpO2 99%   BMI 14.23 kg/m²   Constitutional:  Chronically ill appearing  Older than stated age  Non-verbal   Neurological: Right upper extremity held in flexion  Otherwise unable to participate in neuro exam   Psychiatric: Unable to participate in exam   HEENT: Normocephalic / atraumatic  PERRLA  Midline trachea  No scars across the neck   Cardiac: Regular rate and rhythm.   Pulmonary: Normal pulmonary effort.   Abdomen: Soft, not " distended.        Extremities/  Musculoskeletal: Right upper extremity radiocephalic AVF with severe pulsatility  Brachial pulse 2+       Significant Labs:  Cardiac markers:   Recent Labs   Lab 04/27/20  1039   TROPONINI 0.521*     CBC:   Recent Labs   Lab 04/28/20  0521   WBC 12.10   RBC 2.59*   HGB 7.4*   HCT 24.3*      MCV 94   MCH 28.6   MCHC 30.5*     CMP:   Recent Labs   Lab 04/28/20  0521   *   CALCIUM 10.3   ALBUMIN 1.4*   PROT 8.3   *   K 5.0   CO2 26   CL 91*   BUN 94*   CREATININE 5.7*   ALKPHOS 80   ALT 16   AST 28   BILITOT 0.5     Coagulation:   Recent Labs   Lab 04/28/20  0521   APTT 80.1*

## 2020-04-29 NOTE — ASSESSMENT & PLAN NOTE
S/p HD AVF declot 4/28/20  S/p HD 4/28/20 x 3hrs (post-AVF declot) with no net UF removed  Meds to renal parameters  CBC, renal function panel with labs    Hgb 8.1  -PMHx: GIB  -s/p 1u pRBC 4/28/20  -ferritin >31343 4/22/20  -on epogen  -maintain Hgb>7  -managed Hospital Medicine    Corrected Ca+ 11.78  -PTH (add-on)  Ph- 2.9  Alb 1.4    Currently NPO  -on Novasource renal with TF    /59  -on midodrine PRN  99%/RA  Anuric     [facility]  Dr. Lemus  TTS  3:45  AVF  DW 52.5kg

## 2020-04-29 NOTE — PROGRESS NOTES
Hospital Medicine  History and Physical  [facility]      Patient Name: Vaughn Retana  MRN:  [id number]  Hospital Medicine Team: [facility] HOSP MED K Vania Calzada MD  Date of Admission:  4/22/2020     Length of Stay:  LOS: 7 days     Principal Problem: Suspected Covid-19 Virus Infection       History of Present Illness:    Mr. Vaughn Retana is a 54 y.o. male with hypertension, intracranial bleed, end-stage renal on dialysis MWF,L BKA 2/2 osteomyelitis, history of DVT, history of heart failure including pulmonary hypertension with normal EF (last 2017), hx upper GI bleeds, PEG status, seizures, latent TB presented from [facility] for evaluation of shortness of breath.  Patient isn't able to speak, so history is obtained via yes/no questions and ER documentation.  He denies any SOB, cough, fever, or chills and says he feels ok with no pain.  Of note, he just had an extensive hospitalization at [facility] from 3/28-4/15 for COVID.  At the end of his hospital course, he was transfused blood and decided to maintain a conservative approach so GI wasn't involved.  Additionally, he was DNR during the last hospital stay.    Upon arrival to the ER, vitals were temp 98.9F with HR 110s and satting 100% on 2L NC.  CXR showed improved consolidations from his recent hospitalization.  He was given Vanc and Zosyn and was admitted to Hospital Medicine for further management.    Interval History:  4/29: patient remains tachycardic- EKG NSR today. Hg responded well to 1UPRBC administered at HD last night. No reports of overt bleeding. Patient remains most appropriate for hospice care but family without any decision toward palliative transition. Appreciate palliative team assistance.     4/28 + fever again AM. Re-dosed vancomycin and zosyn--cultures remain unremarkable. Repeat CXR and abdominal film without obvious source. Vancomycin random level therapeutic. CT head completed 4/27 without any acute  changes. Repeat Lactate < 2. Hg stable. No leukocytosis present. Troponin elevated but plateaued in setting of ESRD. PEG tube looks healthy-no signs of infection. Stage 2 sacral wound present but unchanged--does not appear necrotic and no discharge to indicate this as source.     S/p declot 4/28 with vascular surgery. Have confirmed that dialysis will be scheduled for today and he can receive one UPRBC. Have stopped heparin gtt see conversation with family documented below for details--Hg drop in setting of recurrent GI bleeds. Discussed with family-decision to hold heparin at this time as they are more concerned about bleeding risk.     Previous Concern for G+ cocci in blood sample- but more likely contaminant as one bottle with cogulase negative staph. Continue on zosyn for possible PNA but CXR improved. Hg low on admission--GI has evaluated, Hg responded to 1 UPRBC and no plan for elective procedure at this time. D-dimer at 6 now 4-- concern for GI bleeding risk. Concern for another infectious process than covid at this time.       Review of Systems:  Unable to obtain 2/2 aphasia      Past Medical History: Patient has a past medical history of Amputation stump pain (9/10/2013), Aspiration pneumonia (7/27/2015), Asterixis (11/8/2016), C. difficile colitis (8/7/2015), Cholelithiasis without obstruction (8/25/2015), Chronic diastolic heart failure, Chronic low back pain (12/1/2015), Closed head injury (9/8/2016), DVT (deep venous thrombosis) (7/28/2017), ESRD on hemodialysis (2/7/2013), GERD (gastroesophageal reflux disease), HCV antibody positive, Hemiparesis affecting left side as late effect of stroke (11/08/2016), History of Intracerebral Hemorrhage: L BG 5/2013; R BG 9/2016; R BG 11/2016; L caudate head 2/2017 (11/2/2016), Hypertension, left basal ganglia ICH 5/2013 (11/2/2016), Left Caudate Head ICH 2/22/2017 (2/24/2017), Malignant hypertension with heart failure and ESRD (8/1/2015), Metabolic acidosis, IAG,  reduced excretion of inorganic acids, Myoclonic jerking (9/20/2016), Noncompliance with medication regimen (12/4/2018), Secondary hyperparathyroidism (of renal origin), Secondary pulmonary hypertension (3/23/2017), 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      Past Surgical History: Patient has a past surgical history that includes R AVF (9/12/12); Leg amputation through knee (12/18/2013); Foot amputation through metatarsal; Colonoscopy; Colonoscopy (N/A, 4/4/2017); Esophagogastroduodenoscopy (N/A, 6/12/2018); Upper gastrointestinal endoscopy; Esophagogastroduodenoscopy (N/A, 3/7/2019); Esophagogastroduodenoscopy (N/A, 9/23/2019); Esophagogastroduodenoscopy (N/A, 10/2/2019); Esophagogastroduodenoscopy (N/A, 11/12/2019); Esophagogastroduodenoscopy (N/A, 2/14/2020); Fistulogram (Right, 4/28/2020); and Declotting of vascular graft (N/A, 4/28/2020).      Social History: Patient reports that he has quit smoking. He has a 10.00 pack-year smoking history. He has never used smokeless tobacco. He reports that he does not drink alcohol or use drugs.      Family History: Patient's family history includes Alcohol abuse in his maternal grandmother; Diabetes in his brother and maternal grandfather; Early death in his mother; Heart disease in his father; Hyperlipidemia in his father; Hypertension in his father and sister; Kidney disease in his father.      Medications: Scheduled Meds:   albuterol  2 puff Inhalation Q6H    bisacodyL  10 mg Rectal Once    epoetin la nena-ebpx (RETACRIT) injection  50 Units/kg Intravenous Every Mon, Wed, Fri    heparin (PORCINE)  60 Units/kg Intravenous Once    levetiracetam oral soln  250 mg Per G Tube BID    metoclopramide HCl  5 mg Per G Tube QID    pantoprozole (PROTONIX) IV  40 mg Intravenous Q12H    piperacillin-tazobactam (ZOSYN) IVPB  4.5 g Intravenous Q12H    senna-docusate 8.6-50 mg  1 tablet Per G Tube BID    sevelamer carbonate  800  mg Per G Tube TID WM     Continuous Infusions:   heparin (porcine) in D5W       PRN Meds:.sodium chloride, sodium chloride 0.9%, sodium chloride 0.9%, sodium chloride 0.9%, acetaminophen, dextromethorphan-guaifenesin  mg/5 ml, Dextrose 10% Bolus, Dextrose 10% Bolus, glucagon (human recombinant), glucose, glucose, heparin (PORCINE), heparin (PORCINE), insulin aspart U-100, melatonin, midodrine, ondansetron, sodium chloride 0.9%, Pharmacy to dose Vancomycin consult **AND** vancomycin - pharmacy to dose      Allergies: Patient is allergic to fosrenol [lanthanum].      Physical Exam:    Temp:  [96.4 °F (35.8 °C)-98.5 °F (36.9 °C)]   Pulse:  []   Resp:  [12-23]   BP: ()/()   SpO2:  [97 %-100 %]     Constitutional: Appears sickly  Head: Normocephalic and atraumatic.   Eyes: EOM are normal. Pupils are equal, round, and reactive to light. No scleral icterus.   Neck: Normal range of motion. Neck supple.   Cardiovascular: Normal heart rate.  Regular heart rhythm.  No murmur heard.  Pulmonary/Chest: Comfortable on 2L NC.  Coarse breath sounds throughout  Abdominal: Soft. Bowel sounds are normal.  No distension.  No tenderness  Musculoskeletal: Left BKA.  Right leg contractures  Neurological: Alert and making eye contact.  Not answering questions  Skin: Skin is warm and dry.   Psychiatric: Normal mood and affect. Behavior is normal.       Laboratory:  Recent Labs   Lab 04/27/20  1039 04/28/20  0521 04/29/20  0714   WBC 9.52 12.10 9.62   LYMPH 11.4*  1.1 10.1*  1.2 9.1*  0.9*   HGB 8.1* 7.4* 8.1*   HCT 26.1* 24.3* 26.6*    220 247     Recent Labs   Lab 04/26/20  0453  04/27/20  1039 04/28/20  0521 04/29/20  0714      < > 133* 132* 136   K 3.6   < > 4.3 5.0 4.0      < > 94* 91* 98   CO2 29   < > 26 26 25   BUN 27*   < > 74* 94* 46*   CREATININE 2.5*   < > 4.9* 5.7* 3.1*   *   < > 375* 243* 230*   CALCIUM 9.0   < > 9.6 10.3 9.7   MG 1.9  --   --  2.2 1.9   PHOS 1.1*  --   --   4.0 2.9    < > = values in this interval not displayed.     Recent Labs   Lab 04/27/20  1039 04/28/20  0521 04/29/20  0714   ALKPHOS 96 80 90   ALT 18 16 50*   AST 36 28 210*   ALBUMIN 1.5* 1.4* 1.4*   PROT 8.6* 8.3 8.1   BILITOT 0.4 0.5 1.0      Recent Labs     04/27/20  1039  04/29/20  0714   DDIMER 3.48*  --   --    CRP  --    < > 265.7*   TROPONINI 0.521*  --   --    LACTATE 1.6  --  2.6*    < > = values in this interval not displayed.       All labs within the last 24 hours were reviewed.     Microbiology:  Lab Results   Component Value Date    [id number] Positive (A) 04/22/2020       Microbiology Results (last 7 days)     Procedure Component Value Units Date/Time    Blood culture [[id number]] Collected:  04/25/20 0804    Order Status:  Completed Specimen:  Blood Updated:  04/29/20 1012     Blood Culture, Routine No Growth after 4 days.     Narrative:       ADD-ON CRP #[id number] Per. mitch Calzada MD  04/25/2020  08:33     Blood culture [[id number]] Collected:  04/27/20 1039    Order Status:  Completed Specimen:  Blood Updated:  04/28/20 1222     Blood Culture, Routine No Growth to date      No Growth to date    Narrative:       Collection has been rescheduled by SM3 at 04/27/2020 09:57 Reason:   Unable to collect  Collection has been rescheduled by SM3 at 04/27/2020 10:03 Reason:   Unable to collect  Collection has been rescheduled by SM3 at 04/27/2020 10:04 Reason:   spoke with ISMAEL Roland  Collection has been rescheduled by SM3 at 04/27/2020 09:57 Reason:   Unable to collect  Collection has been rescheduled by SM3 at 04/27/2020 10:03 Reason:   Unable to collect  Collection has been rescheduled by SM3 at 04/27/2020 10:04 Reason:   spoke with ISMAEL Roland    Blood culture x two cultures. Draw prior to antibiotics. [[id number]] Collected:  04/22/20 1849    Order Status:  Completed Specimen:  Blood from Peripheral, Antecubital, Left Updated:  04/26/20 0671     Blood Culture, Routine No Growth after 4 days.      Narrative:       Aerobic and anaerobic    Blood culture x two cultures. Draw prior to antibiotics. [[id number]]  (Abnormal) Collected:  04/22/20 1919    Order Status:  Completed Specimen:  Blood from Peripheral, Forearm, Left Updated:  04/26/20 0956     Blood Culture, Routine Gram stain miya bottle: Gram positive cocci in clusters resembling Staph       Results called to and read back by:Marco Lucero RN 04/24/2020  01:00      COAGULASE-NEGATIVE STAPHYLOCOCCUS SPECIES  Organism is a probable contaminant      Narrative:       Aerobic and anaerobic    Blood culture [[id number]]     Order Status:  Canceled Specimen:  Blood     Blood culture [[id number]]     Order Status:  Canceled Specimen:  Blood     Culture, Respiratory with Gram Stain [[id number]]     Order Status:  No result Specimen:  Sputum, Expectorated             Imaging  ECG Results          EKG 12-lead (Edited Result - FINAL)  Result time 04/23/20 12:54:59    Edited Result - FINAL by Interface, Lab In [facility] (04/23/20 12:54:59)                 Narrative:    Test Reason : R06.00,    Vent. Rate : 113 BPM     Atrial Rate : 113 BPM     P-R Int : 132 ms          QRS Dur : 094 ms      QT Int : 342 ms       P-R-T Axes : 082 067 083 degrees     QTc Int : 469 ms    Age and gender specific analysis  Sinus tachycardia  Incomplete right bundle branch block  Nonspecific T wave abnormality  Cannot exclude Lateral infarct  Abnormal ECG  When compared with ECG of 28-MAR-2020 13:40,  Left anterior fascicular block is no longer Present  lateral infarct is now present  Reconfirmed by KYLER AVERY MD ([id number]) on 4/23/2020 12:54:46 PM    Referred By: [email or web address]   SELF           Confirmed By:KYLER AVERY MD                              No results found for this or any previous visit.    Cardiac catheterization  Procedure performed in the Invasive Lab    - See Procedure Log link below for nursing documentation    - See OpNote on Surgeries Tab for physician findings   CT Head  Without Contrast  Narrative: EXAMINATION:  CT HEAD WITHOUT CONTRAST    CLINICAL HISTORY:  Confusion/delirium, altered LOC, unexplained;    TECHNIQUE:  Low dose axial images were obtained through the head.  Coronal and sagittal reformations were also performed. Contrast was not administered.    COMPARISON:  February 8, 2020    FINDINGS:  Axial noncontrast CT examination of the brain was performed.  Axial imaging, sagittal and coronal reconstruction imaging is submitted.  The ventricular system and sulcal pattern demonstrate chronic involutional change, chronic appearing white matter change is noted with areas consistent with remote lacunar-type ischemic change noted, particular involving the basal ganglia bilaterally and the thalamus right greater than left.  Central basal ganglia calcifications/mineralization is noted.  There is no evidence for intracranial mass, mass effect or midline shift and there is no evidence for acute intracranial hemorrhage.  Appropriate CSF spaces are seen at the skull base.    The orbits appear intact.  The visualized mastoid air cells appear appropriate when accounting for averaging, minimal paranasal sinus mucosal thickening is noted.  The osseous structures demonstrate chronic change.  Impression: Chronic changes are noted, there is no evidence for superimposed acute intracranial process.    Electronically signed by: Vijay Taylor  Date:    04/28/2020  Time:    00:28      All imaging within the last 24 hours was reviewed.       Assessment and Plan:    Active Hospital Problems    Diagnosis  POA    *COVID-19 virus infection [U07.1]  Yes    Leukocytosis [D72.829]  Yes    Pulmonary embolism [I26.99]  Yes    Palliative care encounter [Z51.5]  Not Applicable    Advanced care planning/counseling discussion [Z71.89]  Not Applicable    Goals of care, counseling/discussion [Z71.89]  Not Applicable    ESRD on dialysis [N18.6, Z99.2]  Not Applicable    Pressure injury due to medical  device [T85.898A, L89.90]  Yes    Seizure [R56.9]  Yes    Chronic blood loss anemia [D50.0]  Yes    Hemodialysis-associated hypotension [I95.3]  Yes    Severe malnutrition [E43]  Yes     Assessment and Plan  Severe Malnutrition in the context of Social/Environmental Circumstances     Related to (etiology):  Unknown etiology     Signs and Symptoms (as evidenced by):  Energy Intake: per pt, <75% intake over the last year  Body Fat Depletion: overall subcutaneous fat loss, moderate triceps fat loss and protruding patellas.  Muscle Mass Depletion:  moderate muscle wasting of interosseous, temporal, clavicle, calves and quadriceps  Weight Loss: 24% (39 lbs) x 1 year    Recommendations     Recommendation/Intervention: 1. Should pt be cleared for po diet, recommend renal diet with texture per SLP along with Novasource ONS TID. 2. Should pt require enteral feeding, initiate Novasource renal formula at 10ml/hr and advance 10ml Q4hrs to goal rate of 40ml/hr (provides 1920kcal, 87g pro, 691ml free water). Provide additional 500ml water flush/24 hrs. Hold for residuals >500ml.  Goals: 1. Pt to receive nutrition < 48 hrs.      Erosive gastritis [K29.60]  Yes    Chronic upper GI bleeding [K92.2]  Yes    Chronic kidney disease-mineral and bone disorder [N18.9, E83.9, M89.9]  Yes     Chronic    Chronic diastolic heart failure [I50.32]  Yes     Chronic     2-23-17    1 - Low normal to mildly depressed left ventricular systolic function (EF 50-55%).     2 - Right ventricular enlargement with mildly depressed systolic function.     3 - Left ventricular diastolic dysfunction.     4 - Right atrial enlargement.     5 - Severe tricuspid regurgitation.     6 - Pulmonary hypertension. The estimated PA systolic pressure is 86 mmHg.     7 - Increased central venous pressure.       History of Intracerebral Hemorrhage: L BG 5/2013; R BG 9/2016; R BG 11/2016; L caudate head 2/2017 [Z86.79]  Not Applicable     Chronic    Acute  respiratory failure with hypoxia [J96.01]  Yes    Stenosis of arteriovenous dialysis fistula [T82.858A]  No    Anemia in chronic kidney disease [N18.9, D63.1]  Yes     Chronic      Resolved Hospital Problems   No resolved problems to display.       Covid-19 Virus Infection  - COVID-19 testing: Positive on 3/25 and again on 4/22  - Isolation: Airborne/Droplet. Surgical mask on patient. Notify Infection Control  - Diagnostics: Trend Q48hrs if stable, more frequently if patient decompensating.  Lymphopenia, hyponatremia, hyperferritinemia, elevated troponin, elevated d-dimer, age, and comorbidities are significant predictors of poor clinical outcome)  o CBC:  WBC 15  o CMP:  ESRD on HD  o Ferritin:    o D-dimer:    o CRP:  270  o BNP:    o CPK:  957  o LDH:  226  o Troponin:  0.439  o Procalcitonin:  5.39  o CXR:  Improving consolidations  o Blood culture x2 4/22/2020 NGTD  o Sputum culture 4/22/2020 NGTD    - Management: Per [facility] COVID Treatment Protocol (4/15/20)    - Monitoring:   - Telemetry & Continuous Pulse Oximetry    - Nutrition:    - Multivitamin PO daily   - Add Boost supplement   - Vitamin D 1000IU daily if deficient   - Ascorbic acid 500mg PO bid    - Supportive Care:   - acetaminophen 650mg PO Q6hr PRN fever/headache   - loperamide PRN viral diarrhea   - IVF if indicated, restrictive strategy preferred, no maintenance IV if able   - VTE PPx: enoxaparin or heparin SQ unless contraindicated    - Antibiotics:  - if indications, CXR findings, elevated procal. See protocol for alternatives.   - Discontinue early if low concern for bacterial co-infection   - Vanc/Zosyn given COVID and recent hospitalization    Acute Hypoxemic Respiratory Failure  Acute Respiratory Disease 2/2 COVID19  - Order RT consult via Respiratory Communication for COVID Protocols  - Order Incentive Spirometer Q4h  - Order Flutter Valve Q4h  - Continuous Pulse Oximetry  - Goal SpO2 92-96%  - Supplemental O2 via LFNC, VentiMask, or  HFNC (see Respiratory Support Oxygen Therapies)  - If wheezing, albuterol INH Q6h scheduled & PRN  - Proning Protocol if patient is a candidate (see  Proning Protocol)   - GCS >13, able to self-prone  - If deterioration, may warrant trial of NIPPV and transfer to [facility] pressure room or immediate ICU consult  - CXR with multifocal opacities  - Satting 100% on 2L NC  - Continue Vanc/Zosyn given recent hospitalization currently, discuss with ID as difficulty finding source. Maybe blood clots?   - ID recs: complete 7 days zosyn (End date 4/29), continue to monitor clinically.     Chronic Upper GIB  Erosive Gastritis  · Reports of recurrent and chronic GIB  · Last EGD 2/14/20 with non-bleeding erosive gastropathy  · Continue PPI BID  · GI consult given severe drop in anemia, no plans for EGD currently  · 4/27: Hg 8 stable    · 4/28: Slight trend downward in Hg 7.4 with tachycardia, transfuse 1UPRBC with HD.     Chronic Blood Loss Anemia  Anemia in CKD  · Hemoglobin 7.2 on admit with tachycardia  · Was 9 2 weeks ago at OR  · Type and screen ordered  · 4/28: Transfused 1 unit PRBCs  · 4/29: Repeat Hg this AM of 8 today    History of ICH  · 2013  · Resides at [facility]  · 4/28: Repeat CT scan today, no overt neurological changes but patient appears more lethargic.     Chronic Diastolic Heart Failure  · Chronic and stable  · Not on BP medications    HD Associated Hypotension  · Chronic issue  · Continue Midodrine with HD MWF    CKD Mineral and Bone Disorder  ESRD on HD  · Chronic and stable  · Continue Renvela TID    Seizure Disorder  · Chronic and stable  · Continue Keppra 250mg BID  · Seizure precautions    Sacral Pressure Injury  · Wound Care consult    Lower Extremity DVT  - on heparin gtt currently  - due to high bleeding risk hesistant to commit patient to long term oral anticoagulation especially as this may be a chronic thrombus.     Rt lower lung Pulmonary embolism  - treatment heparing gtt.   - extensive  discussion with family concerning risk/benefit of anticoagulation in history of recurrent UGI bleeding. We discussed issues that patient drop in Hg again 4/28. No hematemesis and no bowel movement today but remains concerning. They agree that they would like to hold the anticoagulation at this time until assess for GI bleeding.  Will monitor overnight but he may need to be restarted on heparin if Hg stable after transfusion.   -4/29: monitored overnight, appropriate increase in HG with transfusion, no reports of melena or hematemesis, EKG with sustained NSR, will restart heparin gtt at low intensity.     Patient's chronic/stable medical conditions noted in the assessment above will be managed with the patient's home medications as tolerated.      Diet:  Renal pleasure feeds, TF  VTE PPx:  Heparin BID  Goals of Care:  Full code based on paperwork from NH.  Was DNR on previous admission.    4/25: sister updated on phone at 2:40pm    4/27: updated sister on phone. We discussed the significant complexity of medical issues and that my recommendation would be to begin a transition to hospice capacity. She will discuss further with her brother but no decisions made yet. We have discussed continuing heparin gtt in hospital for today but long term my recommendation is to avoid anticoagulation as he has long history of life threatening recurent GI bleeding.  I was very straight forward that he may not recover from this hospitalization due to significant comorbidities, infection, pulmonary embolism, heart failure, ESRD, bed bound, dementia with severe neurological issues related to prior ICH/stroke and malnutrition with sacral stage 2 wound.     4/28: updated sister at 4/28. We discussed his declot and plans for dialysis. I informed her that he needs 1U PRBC today as his Hg dropped. She agrees with holding the heparin gtt tonight to ensure no evidence of bleeding before restarting potentially in AM.  ([facility] hospital  admissions for hematemesis). Again emphasized the appropriateness of transition to palliative. Patient with very poor quality of life.      Vania Calzada

## 2020-04-29 NOTE — CARE UPDATE
Rapid Response Nurse Chart Check     Chart check completed, abnormal VS noted. bedside RNRogerio contacted, no concerns verbalized at this time, instructed to call [phone] for further concerns or assistance.

## 2020-04-29 NOTE — PLAN OF CARE
Problem: Malnutrition  Goal: Improved Nutritional Intake  Outcome: Ongoing, Progressing  Intervention: Promote and Optimize Oral Intake  Flowsheets (Taken 4/29/2020 0947)  Oral Nutrition Promotion: other (see comments)  Intervention: Promote and Optimize Nutrition Support  Flowsheets (Taken 4/29/2020 0947)  Nutrition Support Management: tube feeding rate decreased; weight trending reviewed; other (see comments)     Recommendations     1.) Current TF meeting >125% of nutritional needs with possibility of overfeeding: Decrease current TF to Novasource Renal @ 35mL/hr to provide 1680 kcals, 76gm protein and 602mL of free water.               TF to meet 107% EEN and 113% EPN.   2.) ADAT to regular; texture per SLP.   3.) Suggest MVI.   4.) Daily weights.      Goals: 1.) Pt to achieve/tolerate >75% EEN and EPN by follow up.   Nutrition Goal Status: progressing towards goal, goal not met  Communication of RD Recs: other (comment)(POC)

## 2020-04-29 NOTE — PROGRESS NOTES
"[facility]  Adult Nutrition  Progress Note    SUMMARY       Recommendations    1.) Current TF meeting >125% of nutritional needs with possibility of overfeeding: Decrease current TF to Novasource Renal @ 35mL/hr to provide 1680 kcals, 76gm protein and 602mL of free water.    TF to meet 107% EEN and 113% EPN.   2.) ADAT to regular; texture per SLP.   3.) Suggest MVI.   4.) Daily weights.     Goals: 1.) Pt to achieve/tolerate >75% EEN and EPN by follow up.   Nutrition Goal Status: progressing towards goal, goal not met  Communication of RD Recs: other (comment)(POC)    Reason for Assessment    Reason For Assessment: RD follow-up  Diagnosis: infection/sepsis(COVID19+)  Relevant Medical History: HTN, CHF, aspiration pneumonia with PEG, ESRD on HD, GERD, DVT, L BKA  Interdisciplinary Rounds: did not attend  General Information Comments: Remote access: Pt remains NPO, TF infusing at ordered rate 45mL/hr. Pt tolerating with no GRV. No GI distress. S/p right fistulogram declot 4/28. Pt remains non-verbal and unable to aid in assessment. Last HD 4/29 with UF removed. Noted wt loss since admit: ADMIT #, 4/24/2020-98#; CBW 88#. Will continue monitor. Pt continues to meet severe malnutrition. NFPE not performed, patient has been screened for possible COVID-19 and has been placed on airborne and contact precautions. Patient is noted as being positive for COVID-19.    Nutrition Discharge Planning: Unable to determine at this time.     Nutrition Risk Screen    Nutrition Risk Screen: tube feeding or parenteral nutrition    Nutrition/Diet History    Food Allergies: NKFA  Factors Affecting Nutritional Intake: chewing difficulties/inability to chew food, difficulty/impaired swallowing    Anthropometrics    Temp: 98.3 °F (36.8 °C)  Height Method: Stated  Height: 5' 6" (167.6 cm)  Height (inches): 66 in  Weight Method: Bed Scale  Weight: 40 kg (88 lb 2.9 oz)  Weight (lb): 88.18 lb  Ideal Body Weight (IBW), " Male: 142 lb  % Ideal Body Weight, Male (lb): 69.01 %  BMI (Calculated): 14.2  BMI Grade: less than 16 protein-energy malnutrition grade III  Weight Loss: unintentional  Usual Body Weight (UBW), k.8 kg(2020)  Weight Change Amount: 26 lb 3.8 oz  % Usual Body Weight: 79.21  % Weight Change From Usual Weight: -20.95 %  Amputation %: 5.9  Total Amputation %: 5.9  Amputation Ideal Body Weight (IBW), Male (lb): 136.1 lb  Amputee BMI (kg/m2): 17.03 kg/m2       Lab/Procedures/Meds    Pertinent Labs Reviewed: reviewed  Pertinent Labs Comments: BUN 46, Cr 3.1, GFR 24.8, albumin 1.4, Glu 230, , ALT 50, .7  Pertinent Medications Reviewed: reviewed  Pertinent Medications Comments: bisacodyl, reglan, protonix, zosyn, senna-docusate    Estimated/Assessed Needs    Weight Used For Calorie Calculations: 44.9 kg (98 lb 15.8 oz)  Energy Calorie Requirements (kcal): 8169-2415  Energy Need Method: Kcal/kg(30-35 )  Protein Requirements: 54-67(g/day)  Weight Used For Protein Calculations: 44.9 kg (98 lb 15.8 oz)(1.2-1.5 g/kg)     Estimated Fluid Requirement Method: other (see comments)(urine output + 1000mL)  RDA Method (mL): 1347       Nutrition Prescription Ordered    Current Diet Order: NPO  Nutrition Order Comments: -  Current Nutrition Support Formula Ordered: Novasource Renal  Current Nutrition Support Rate Ordered: 45 (ml)  Current Nutrition Support Frequency Ordered: ml/hr    Evaluation of Received Nutrient/Fluid Intake    Enteral Calories (kcal): 2160  Enteral Protein (gm): 98  Enteral (Free Water) Fluid (mL): 774  Total Calories (kcal/kg): 54  % Kcal Needs: 137  Total Protein (gm/kg): 2.5  % Protein Needs: 146  I/O: -1.5L since admit  Energy Calories Required: exceeds needs  Protein Required: exceeds needs  Fluid Required: other (see comments)(per MD)  Comments: LBM   Tolerance: tolerating  % Intake of Estimated Energy Needs: Other: >125%  % Meal Intake: NPO    Nutrition Risk    Level of Risk/Frequency of  Follow-up: moderate(x1/week)     Assessment and Plan  Nutrition Problem:  Severe Protein-Calorie Malnutrition  Malnutrition in the context of Chronic Illness/Injury     Related to (etiology):  Varied intake; TF schedule and intake     Signs and Symptoms (as evidenced by)  Energy Intake: <50% of estimated energy requirement for >1 month  Weight Loss: 22% x 4 months  Predicted fat & muscle loss     Interventions(treatment strategy):  Collaboration of care with other providers  Modified diet-puree  Enteral nutrition-Novasource Renal @ 35mL/hr; or 210mL bolus q6h     Nutrition Diagnosis Status:  Continues       Monitor and Evaluation    Food and Nutrient Intake: energy intake, enteral nutrition intake  Food and Nutrient Adminstration: enteral and parenteral nutrition administration  Anthropometric Measurements: weight, weight change, body mass index  Biochemical Data, Medical Tests and Procedures: electrolyte and renal panel, gastrointestinal profile, glucose/endocrine profile, inflammatory profile  Nutrition-Focused Physical Findings: overall appearance     Malnutrition Assessment  NFPE not performed, patient has been screened for possible COVID-19 and has been placed on airborne and contact precautions. Patient is noted as being positive for COVID-19.    Nutrition Follow-Up    RD Follow-up?: Yes

## 2020-04-29 NOTE — PROGRESS NOTES
[facility]  Nephrology  Progress Note    Patient Name: Vaughn Retana  MRN: [id number]  Admission Date: 4/22/2020  Hospital Length of Stay: 7 days  Attending Provider: Vania Calzada MD   Primary Care Physician: Cori Randall MD  Principal Problem:COVID-19 virus infection    Subjective:     HPI: 55 y/o male with PMHx ESRD on iHD (TTS, last HD T 4/21/20), hospitalization for COVID-19 (3/28/20-4/15/20), HF, GIB requiring blood transfusion, PEG status, seizures, and latent TB presented from NH for evaluation of shortness of breath; admitted with COVID-19 infection    Interval History: Nephrology consulted for ESRD Management.    Review of patient's allergies indicates:   Allergen Reactions    Fosrenol [lanthanum] Nausea And Vomiting     Nausea and vomiting     Current Facility-Administered Medications   Medication Frequency    0.9%  NaCl infusion (for blood administration) Q24H PRN    0.9%  NaCl infusion PRN    0.9%  NaCl infusion PRN    0.9%  NaCl infusion PRN    acetaminophen tablet 650 mg Q4H PRN    albuterol inhaler 2 puff Q6H    bisacodyL suppository 10 mg Once    dextromethorphan-guaifenesin  mg/5 ml liquid 10 mL Q4H PRN    dextrose 10% (D10W) Bolus PRN    dextrose 10% (D10W) Bolus PRN    epoetin la nena-epbx injection 2,000 Units Every Mon, Wed, Fri    glucagon (human recombinant) injection 1 mg PRN    glucose chewable tablet 16 g PRN    glucose chewable tablet 24 g PRN    heparin 25,000 units in dextrose 5% (100 units/ml) IV bolus from bag - ADDITIONAL PRN BOLUS - 30 units/kg PRN    heparin 25,000 units in dextrose 5% (100 units/ml) IV bolus from bag - ADDITIONAL PRN BOLUS - 60 units/kg PRN    heparin 25,000 units in dextrose 5% (100 units/ml) IV bolus from bag INITIAL BOLUS Once    heparin 25,000 units in dextrose 5% 250 mL (100 units/mL) infusion LOW INTENSITY nomogram - [facility] Continuous    insulin aspart U-100 pen 0-5 Units Q6H PRN    levetiracetam oral soln Soln  250 mg BID    melatonin tablet 6 mg Nightly PRN    metoclopramide HCl tablet 5 mg QID    midodrine tablet 5 mg PRN    ondansetron injection 4 mg Q8H PRN    pantoprazole injection 40 mg Q12H    piperacillin-tazobactam 4.5 g in sodium chloride 0.9% 100 mL IVPB (ready to mix system) Q12H    senna-docusate 8.6-50 mg per tablet 1 tablet BID    sevelamer carbonate tablet 800 mg TID WM    sodium chloride 0.9% flush 10 mL Q8H PRN    vancomycin - pharmacy to dose pharmacy to manage frequency       Objective:     Vital Signs (Most Recent):  Temp: 98.8 °F (37.1 °C) (04/29/20 1100)  Pulse: (!) 121 (04/29/20 0823)  Resp: 17 (04/29/20 0823)  BP: (!) 102/59 (04/29/20 0823)  SpO2: 99 % (04/29/20 0823)  O2 Device (Oxygen Therapy): room air (04/29/20 0739) Vital Signs (24h Range):  Temp:  [96.4 °F (35.8 °C)-98.8 °F (37.1 °C)] 98.8 °F (37.1 °C)  Pulse:  [] 121  Resp:  [12-23] 17  SpO2:  [97 %-100 %] 99 %  BP: ()/() 102/59     Weight: 40 kg (88 lb 2.9 oz) (04/27/20 0400)  Body mass index is 14.23 kg/m².  Body surface area is 1.36 meters squared.    I/O last 3 completed shifts:  In: 1140 [Other:950; NG/GT:190]  Out: 948 [Other:948]    Physical Exam   Constitutional:   PE deferred to Primary Team d/t COVID-19 precautions; reviewed 4/29/20 Progress Note by Dr. Calzada.   Nursing note and vitals reviewed.      Significant Labs:  All labs within the past 24 hours have been reviewed.     Significant Imaging:  Labs: Reviewed    Assessment/Plan:     * COVID-19 virus infection  COVID-19 + (4/22/20)  Managed by Primary Team    ESRD on dialysis  S/p HD AVF declot 4/28/20  S/p HD 4/28/20 x 3hrs (post-AVF declot) with no net UF removed  Meds to renal parameters  CBC, renal function panel with labs    Hgb 8.1  -PMHx: GIB  -s/p 1u pRBC 4/28/20  -ferritin >32878 4/22/20  -on epogen  -maintain Hgb>7  -managed Hospital Medicine    Corrected Ca+ 11.78  -PTH (add-on)  Ph- 2.9  Alb 1.4    Currently NPO  -on Novasource renal with  TF    /59  -on midodrine PRN  99%/RA  Anuric     [facility]  Dr. Lemus  TTS  3:45  AVF  DW 52.5kg        Thank you for your consult. I will follow-up with patient. Please contact us if you have any additional questions.    SEAN Figueroa  Nephrology  [facility]

## 2020-04-30 NOTE — PROGRESS NOTES
Therapy with IV Vancomycin complete and/or consult discontinued by provider. Pharmacy will sign off, please re-consult as needed.    Radha Rey, PharmD, BCPS  [phone]

## 2020-04-30 NOTE — CARE UPDATE
Rapid Response Nurse Chart Check     Chart check completed, abnormal VS noted.  BP 90's/50's.   Pt currently undergoing HD.  VSS.   Call [phone] for further concerns or assistance.

## 2020-04-30 NOTE — PLAN OF CARE
04/30/20 0903   Post-Acute Status   Post-Acute Authorization Placement   Post-Acute Placement Status Awaiting Internal Medical Clearance

## 2020-04-30 NOTE — PROGRESS NOTES
Hemodialysis Note  Pt seen and evaluated while undergoing dialysis. Tolerating dialysis with current UFR, without complications. Labs reviewed and dialysate bath adjusted.     In bed in [facility]; Hep gtt/TF infusing  BFR: 300  UF goal: 2L as tolerated    Most recent labs drawn 4/29/20  -HD nurse to draw today's labs    Hgb 8.1  -PMHx: GIB  -s/p 1u pRBC 4/28/20  -ferritin >21397 4/22/20  -on epogen  -maintain Hgb>7  -managed Hospital Medicine    Corrected Ca+ 11.78   4/29/20  Ph- 2.9  -on sevelamer carbonate  Alb 1.4    Currently NPO  -on Novasource renal with TF    /59  -on midodrine PRN  99%/RA  Anuric

## 2020-04-30 NOTE — PROGRESS NOTES
Hospital Medicine  Progress Note  [facility]      Patient Name: Vaughn Retana  MRN:  [id number]  [place] [facility] [place] [facility] [place] Charmaine Pedro MD  Date of Admission:  4/22/2020     Length of Stay:  LOS: 8 days     Principal Problem:  Covid-19 Virus Infection       History of Present Illness:    Mr. Vaughn Retana is a 54 y.o. male with hypertension, intracranial bleed, end-stage renal on dialysis MWF,L BKA 2/2 osteomyelitis, history of DVT, history of heart failure including pulmonary hypertension with normal EF (last 2017), hx upper GI bleeds, PEG status, seizures, latent TB presented from [facility] for evaluation of shortness of breath.  Patient isn't able to speak, so history is obtained via yes/no questions and ER documentation.  He denies any SOB, cough, fever, or chills and says he feels ok with no pain.  Of note, [place] just had [place] [facility] [place].  At the end of his hospital course, he was transfused blood and decided to maintain a conservative approach so GI wasn't involved.  Additionally, he was DNR during the last hospital stay.    Upon arrival to the ER, vitals were temp 98.9F with HR 110s and satting 100% on 2L NC.  CXR showed improved consolidations from his recent hospitalization.  He was given Vanc and Zosyn and was admitted to Hospital Medicine for further management.    Interval History:  4/30: at HD today, can say basic yes/no to examiner, denies pain, says he slept well overnight, on room air, BP lower during HD but rebounded to 130s systolics post op, lactate mildly up yesterday resolved now, afebrile, hg is downtrending again, 7 now, no siggns of bleeding on exa but known upper gi bleeding issues with 5 egs in last year, last in February, was put back on heparin drip yesterday for PE, DVTS, GI has seen this admission, discussed with sister may need transfusion tomorrow as on downtrend again, and  risk/benefit of anticoagualtion fr those things vs known bleeds issues and continued hg downtrend issue is a tricky situation to know which one is higher risk. Liver enzymes improving, on CT in past does have large calcified gallstones, so if had issues again with this, would have low threshold to u/S RUQ but starting to improve now. Biggest issue is hg repeated drop in setting of known PE, DVTs. Discussed with sister. palliatiave is following as has many chronic issues that are not likely to be chageable long term with quality of life concerns.      4/29: patient remains tachycardic- EKG NSR today. Hg responded well to 1UPRBC administered at HD last night. No reports of overt bleeding. Patient remains most appropriate for hospice care but family without any decision toward palliative transition. Appreciate palliative team assistance.     4/28 + fever again AM. Re-dosed vancomycin and zosyn--cultures remain unremarkable. Repeat CXR and abdominal film without obvious source. Vancomycin random level therapeutic. CT head completed 4/27 without any acute changes. Repeat Lactate < 2. Hg stable. No leukocytosis present. Troponin elevated but plateaued in setting of ESRD. PEG tube looks healthy-no signs of infection. Stage 2 sacral wound present but unchanged--does not appear necrotic and no discharge to indicate this as source.     S/p declot 4/28 with vascular surgery. Have confirmed that dialysis will be scheduled for today and he can receive one UPRBC. Have stopped heparin gtt see conversation with family documented below for details--Hg drop in setting of recurrent GI bleeds. Discussed with family-decision to hold heparin at this time as they are more concerned about bleeding risk.     Previous Concern for G+ cocci in blood sample- but more likely contaminant as one bottle with cogulase negative staph. Continue on zosyn for possible PNA but CXR improved. Hg low on admission--GI has evaluated, Hg responded to 1 UPRBC  and no plan for elective procedure at this time. D-dimer at 6 now 4-- concern for GI bleeding risk. Concern for another infectious process than covid at this time.       Review of Systems:  Unable to obtain 2/2 aphasia      Past Medical History: Patient has a past medical history of Amputation stump pain (9/10/2013), Aspiration pneumonia (7/27/2015), Asterixis (11/8/2016), C. difficile colitis (8/7/2015), Cholelithiasis without obstruction (8/25/2015), Chronic diastolic heart failure, Chronic low back pain (12/1/2015), Closed head injury (9/8/2016), DVT (deep venous thrombosis) (7/28/2017), ESRD on hemodialysis (2/7/2013), GERD (gastroesophageal reflux disease), HCV antibody positive, Hemiparesis affecting left side as late effect of stroke (11/08/2016), History of Intracerebral Hemorrhage: L BG 5/2013; R BG 9/2016; R BG 11/2016; L caudate head 2/2017 (11/2/2016), Hypertension, left basal ganglia ICH 5/2013 (11/2/2016), Left Caudate Head ICH 2/22/2017 (2/24/2017), 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      Past Surgical History: Patient has a past surgical history that includes R AVF (9/12/12); Leg amputation through knee (12/18/2013); Foot amputation through metatarsal; Colonoscopy; Colonoscopy (N/A, 4/4/2017); Esophagogastroduodenoscopy (N/A, 6/12/2018); Upper gastrointestinal endoscopy; Esophagogastroduodenoscopy (N/A, 3/7/2019); Esophagogastroduodenoscopy (N/A, 9/23/2019); Esophagogastroduodenoscopy (N/A, 10/2/2019); Esophagogastroduodenoscopy (N/A, 11/12/2019); Esophagogastroduodenoscopy (N/A, 2/14/2020); Fistulogram (Right, 4/28/2020); and Declotting of vascular  graft (N/A, 4/28/2020).      Social History: Patient reports that he has quit smoking. He has a 10.00 pack-year smoking history. He has never used smokeless tobacco. He reports that he does not drink alcohol or use drugs.      Family History: Patient's family history includes Alcohol abuse in his maternal grandmother; Diabetes in his brother and maternal grandfather; Early death in his mother; Heart disease in his father; Hyperlipidemia in his father; Hypertension in his father and sister; Kidney disease in his father.      Medications: Scheduled Meds:   albuterol  2 puff Inhalation Q6H    bisacodyL  10 mg Rectal Once    [START ON 5/2/2020] epoetin la nena-ebpx (RETACRIT) injection  50 Units/kg Intravenous Every Tues, Thurs, Sat    levetiracetam oral soln  250 mg Per G Tube BID    metoclopramide HCl  5 mg Per G Tube QID    pantoprozole (PROTONIX) IV  40 mg Intravenous Q12H    senna-docusate 8.6-50 mg  1 tablet Per G Tube BID    sevelamer carbonate  0.8 g Oral TID WM     Continuous Infusions:   heparin (porcine) in D5W 18 Units/kg/hr (04/30/20 1414)     PRN Meds:.sodium chloride, sodium chloride 0.9%, acetaminophen, dextromethorphan-guaifenesin  mg/5 ml, Dextrose 10% Bolus, Dextrose 10% Bolus, glucagon (human recombinant), glucose, glucose, heparin (PORCINE), heparin (PORCINE), insulin aspart U-100, melatonin, midodrine, ondansetron, sodium chloride 0.9%      Allergies: Patient is allergic to fosrenol [lanthanum].      Physical Exam:    Temp:  [97.4 °F (36.3 °C)-98.2 °F (36.8 °C)]   Pulse:  []   Resp:  [14-18]   BP: ()/(55-91)   SpO2:  [96 %-100 %]     Constitutional: Appears sickly chronically. Basic yes/no answers.  Head: Normocephalic and atraumatic.   Eyes: EOM are normal. Pupils are equal, round, and reactive to light. No scleral icterus.   Neck: Normal range of motion. Neck supple.   Cardiovascular: Normal heart rate.  Regular heart rhythm.  No murmur heard.  Pulmonary/Chest: Comfortable  on 2L NC.  Coarse breath sounds throughout  Abdominal: Soft. Bowel sounds are normal.  No distension.  No tenderness  Musculoskeletal: Left BKA.  Right leg contractures  Neurological: Alert and making eye contact.  Intermittent answering questions  Skin: Skin is warm and dry.   Psychiatric: Normal mood and affect. Behavior is normal.       Laboratory:  Recent Labs   Lab 04/28/20  0521 04/29/20  0714 04/30/20  0400   WBC 12.10 9.62 7.79   LYMPH 10.1*  1.2 9.1*  0.9* 8.9*  0.7*   HGB 7.4* 8.1* 7.0*   HCT 24.3* 26.6* 22.7*    247 311     Recent Labs   Lab 04/28/20  0521 04/29/20  0714 04/30/20  0400   * 136 136   K 5.0 4.0 3.3*   CL 91* 98 99   CO2 26 25 26   BUN 94* 46* 60*   CREATININE 5.7* 3.1* 3.5*   * 230* 322*   CALCIUM 10.3 9.7 8.9   MG 2.2 1.9 2.0   PHOS 4.0 2.9 1.7*     Recent Labs   Lab 04/28/20  0521 04/29/20  0714 04/29/20  1139 04/30/20  0400   ALKPHOS 80 90  --  104   ALT 16 50*  --  69*   AST 28 210*  --  123*   ALBUMIN 1.4* 1.4*  --  1.4*   PROT 8.3 8.1  --  7.6   BILITOT 0.5 1.0  --  0.4   INR  --   --  1.2  --       Recent Labs     04/30/20  0400 04/30/20  0900   .9*  --    LACTATE  --  0.5       All labs within the last 24 hours were reviewed.     Microbiology:  Lab Results   Component Value Date    [id number] Positive (A) 04/22/2020       Microbiology Results (last 7 days)     Procedure Component Value Units Date/Time    Blood culture [[id number]] Collected:  04/27/20 1039    Order Status:  Completed Specimen:  Blood Updated:  04/30/20 1222     Blood Culture, Routine No Growth to date      No Growth to date      No Growth to date      No Growth to date    Narrative:       Collection has been rescheduled by SM3 at 04/27/2020 09:57 Reason:   Unable to collect  Collection has been rescheduled by SM3 at 04/27/2020 10:03 Reason:   Unable to collect  Collection has been rescheduled by SM3 at 04/27/2020 10:04 Reason:   spoke with ISMAEL Roland  Collection has been rescheduled by  ANUEL at 04/27/2020 09:57 Reason:   Unable to collect  Collection has been rescheduled by SM3 at 04/27/2020 10:03 Reason:   Unable to collect  Collection has been rescheduled by [email or web address] at 04/27/2020 10:04 Reason:   spoke with ISMAEL Roland    Blood culture [[id number]] Collected:  04/25/20 0804    Order Status:  Completed Specimen:  Blood Updated:  04/29/20 1012     Blood Culture, Routine No Growth after 4 days.     Narrative:       ADD-ON CRP #[id number] Per. mitch Calzada MD  04/25/2020  08:33     Blood culture x two cultures. Draw prior to antibiotics. [[id number]] Collected:  04/22/20 1849    Order Status:  Completed Specimen:  Blood from Peripheral, Antecubital, Left Updated:  04/26/20 2212     Blood Culture, Routine No Growth after 4 days.     Narrative:       Aerobic and anaerobic    Blood culture x two cultures. Draw prior to antibiotics. [[id number]]  (Abnormal) Collected:  04/22/20 1919    Order Status:  Completed Specimen:  Blood from Peripheral, Forearm, Left Updated:  04/26/20 0956     Blood Culture, Routine Gram stain miya bottle: Gram positive cocci in clusters resembling Staph       Results called to and read back by:Marco Lucero RN 04/24/2020  01:00      COAGULASE-NEGATIVE STAPHYLOCOCCUS SPECIES  Organism is a probable contaminant      Narrative:       Aerobic and anaerobic    Blood culture [[id number]]     Order Status:  Canceled Specimen:  Blood     Blood culture [[id number]]     Order Status:  Canceled Specimen:  Blood             Imaging  ECG Results          EKG 12-lead (Edited Result - FINAL)  Result time 04/23/20 12:54:59    Edited Result - FINAL by Interface, Lab In [facility] (04/23/20 12:54:59)                 Narrative:    Test Reason : R06.00,    Vent. Rate : 113 BPM     Atrial Rate : 113 BPM     P-R Int : 132 ms          QRS Dur : 094 ms      QT Int : 342 ms       P-R-T Axes : 082 067 083 degrees     QTc Int : 469 ms    Age and gender specific analysis  Sinus tachycardia  Incomplete right bundle  branch block  Nonspecific T wave abnormality  Cannot exclude Lateral infarct  Abnormal ECG  When compared with ECG of 28-MAR-2020 13:40,  Left anterior fascicular block is no longer Present  lateral infarct is now present  Reconfirmed by KYLER AVERY MD ([id number]) on 4/23/2020 12:54:46 PM    Referred By: KHARI   SELF           Confirmed By:KYLER AVERY MD                              No results found for this or any previous visit.    Cardiac catheterization  Procedure performed in the Invasive Lab    - See Procedure Log link below for nursing documentation    - See OpNote on Surgeries Tab for physician findings   CT Head Without Contrast  Narrative: EXAMINATION:  CT HEAD WITHOUT CONTRAST    CLINICAL HISTORY:  Confusion/delirium, altered LOC, unexplained;    TECHNIQUE:  Low dose axial images were obtained through the head.  Coronal and sagittal reformations were also performed. Contrast was not administered.    COMPARISON:  February 8, 2020    FINDINGS:  Axial noncontrast CT examination of the brain was performed.  Axial imaging, sagittal and coronal reconstruction imaging is submitted.  The ventricular system and sulcal pattern demonstrate chronic involutional change, chronic appearing white matter change is noted with areas consistent with remote lacunar-type ischemic change noted, particular involving the basal ganglia bilaterally and the thalamus right greater than left.  Central basal ganglia calcifications/mineralization is noted.  There is no evidence for intracranial mass, mass effect or midline shift and there is no evidence for acute intracranial hemorrhage.  Appropriate CSF spaces are seen at the skull base.    The orbits appear intact.  The visualized mastoid air cells appear appropriate when accounting for averaging, minimal paranasal sinus mucosal thickening is noted.  The osseous structures demonstrate chronic change.  Impression: Chronic changes are noted, there is no evidence for superimposed  acute intracranial process.    Electronically signed by: Vijay Taylor  Date:    04/28/2020  Time:    00:28      All imaging within the last 24 hours was reviewed.       Assessment and Plan:    Active Hospital Problems    Diagnosis  POA    *COVID-19 virus infection [U07.1]  Yes    Leukocytosis [D72.829]  Yes    Pulmonary embolism [I26.99]  Yes    Palliative care encounter [Z51.5]  Not Applicable    Advanced care planning/counseling discussion [Z71.89]  Not Applicable    Goals of care, counseling/discussion [Z71.89]  Not Applicable    ESRD on dialysis [N18.6, Z99.2]  Not Applicable    Pressure injury due to medical device [T85.898A, L89.90]  Yes    Seizure [R56.9]  Yes    Chronic blood loss anemia [D50.0]  Yes    Hemodialysis-associated hypotension [I95.3]  Yes    Severe malnutrition [E43]  Yes     Assessment and Plan  Severe Malnutrition in the context of Social/Environmental Circumstances     Related to (etiology):  Unknown etiology     Signs and Symptoms (as evidenced by):  Energy Intake: per pt, <75% intake over the last year  Body Fat Depletion: overall subcutaneous fat loss, moderate triceps fat loss and protruding patellas.  Muscle Mass Depletion:  moderate muscle wasting of interosseous, temporal, clavicle, calves and quadriceps  Weight Loss: 24% (39 lbs) x 1 year    Recommendations     Recommendation/Intervention: 1. Should pt be cleared for po diet, recommend renal diet with texture per SLP along with Novasource ONS TID. 2. Should pt require enteral feeding, initiate Novasource renal formula at 10ml/hr and advance 10ml Q4hrs to goal rate of 40ml/hr (provides 1920kcal, 87g pro, 691ml free water). Provide additional 500ml water flush/24 hrs. Hold for residuals >500ml.  Goals: 1. Pt to receive nutrition < 48 hrs.      Erosive gastritis [K29.60]  Yes    Chronic upper GI bleeding [K92.2]  Yes    Chronic kidney disease-mineral and bone disorder [N18.9, E83.9, M89.9]  Yes     Chronic    Chronic  diastolic heart failure [I50.32]  Yes     Chronic     2-23-17    1 - Low normal to mildly depressed left ventricular systolic function (EF 50-55%).     2 - Right ventricular enlargement with mildly depressed systolic function.     3 - Left ventricular diastolic dysfunction.     4 - Right atrial enlargement.     5 - Severe tricuspid regurgitation.     6 - Pulmonary hypertension. The estimated PA systolic pressure is 86 mmHg.     7 - Increased central venous pressure.       History of Intracerebral Hemorrhage: L BG 5/2013; R BG 9/2016; R BG 11/2016; L caudate head 2/2017 [Z86.79]  Not Applicable     Chronic    Acute respiratory failure with hypoxia [J96.01]  Yes    Stenosis of arteriovenous dialysis fistula [T82.858A]  No    Anemia in chronic kidney disease [N18.9, D63.1]  Yes     Chronic      Resolved Hospital Problems   No resolved problems to display.       Covid-19 Virus Infection  - COVID-19 testing: Positive on 3/25 and again on 4/22  - Isolation: Airborne/Droplet. Surgical mask on patient. Notify Infection Control  - Diagnostics: Trend Q48hrs if stable, more frequently if patient decompensating.  Lymphopenia, hyponatremia, hyperferritinemia, elevated troponin, elevated d-dimer, age, and comorbidities are significant predictors of poor clinical outcome)  o CBC:  WBC 15  o CMP:  ESRD on HD  o Ferritin:    o D-dimer:    o CRP:  270  o BNP:    o CPK:  957  o LDH:  226  o Troponin:  0.439  o Procalcitonin:  5.39  o CXR:  Improving consolidations  o Blood culture x2 4/22/2020 NGTD  o Sputum culture 4/22/2020 NGTD    - Management: Per [facility] COVID Treatment Protocol (4/15/20)    - Monitoring:   - Telemetry & Continuous Pulse Oximetry    - Nutrition:    - Multivitamin PO daily   - Add Boost supplement   - Vitamin D 1000IU daily if deficient   - Ascorbic acid 500mg PO bid    - Supportive Care:   - acetaminophen 650mg PO Q6hr PRN fever/headache   - loperamide PRN viral diarrhea   - IVF if indicated, restrictive  strategy preferred, no maintenance IV if able   - VTE PPx: enoxaparin or heparin SQ unless contraindicated    - Antibiotics:  - if indications, CXR findings, elevated procal. See protocol for alternatives.   - Discontinue early if low concern for bacterial co-infection   - Vanc/Zosyn given COVID and recent hospitalization    Acute Hypoxemic Respiratory Failure  Acute Respiratory Disease 2/2 COVID19  - Order RT consult via Respiratory Communication for COVID Protocols  - Order Incentive Spirometer Q4h  - Order Flutter Valve Q4h  - Continuous Pulse Oximetry  - Goal SpO2 92-96%  - Supplemental O2 via LFNC, VentiMask, or HFNC (see Respiratory Support Oxygen Therapies)  - If wheezing, albuterol INH Q6h scheduled & PRN  - Proning Protocol if patient is a candidate (see HM Proning Protocol)   - GCS >13, able to self-prone  - If deterioration, may warrant trial of NIPPV and transfer to [facility] pressure room or immediate ICU consult  - CXR with multifocal opacities  - Satting 100% on 2L NC  - Continue Vanc/Zosyn given recent hospitalization currently, discuss with ID as difficulty finding source. Maybe blood clots?   - ID recs: complete 7 days zosyn (End date 4/29), continue to monitor clinically.   4/30: off anitbiotics now.    Chronic Upper GIB  Erosive Gastritis  · Reports of recurrent and chronic GIB  · Last EGD 2/14/20 with non-bleeding erosive gastropathy  · Continue PPI BID  · GI consult given severe drop in anemia, no plans for EGD currently  · 4/27: Hg 8 stable    · 4/28: Slight trend downward in Hg 7.4 with tachycardia, transfuse 1UPRBC with HD.   · 4/30: downtrend again today to 7, may need transfusion tomorrow. No clear signs of leeding but known hx of upper bleeds, 5 EGDs in last year, last in feb, GI aware of patient thi sadmit    Chronic Blood Loss Anemia  Anemia in CKD  · Hemoglobin 7.2 on admit with tachycardia  · Was 9 2 weeks ago at DC  · Type and screen ordered  · 4/28: Transfused 1 unit PRBCs  · 4/29: Repeat  Hg this AM of 8 today  · 4/30: down to 7 again. Trend. .may need tx tomorrow if stays on this trend, no signs of activ ebleeding, BP is stable.    History of ICH  · 2013  · Resides at [facility]  · 4/28: Repeat CT scan today, no overt neurological changes but patient appears more lethargic.     Chronic Diastolic Heart Failure  · Chronic and stable  · Not on BP medications    HD Associated Hypotension  · Chronic issue  · Continue Midodrine with HD MWF    CKD Mineral and Bone Disorder  ESRD on HD  · Chronic and stable  · Continue Renvela TID    Seizure Disorder  · Chronic and stable  · Continue Keppra 250mg BID  · Seizure precautions    Sacral Pressure Injury  · Wound Care consult    Lower Extremity DVT  - on heparin gtt currently  - due to high bleeding risk hesistant to commit patient to long term oral anticoagulation especially as this may be a chronic thrombus, heparin restarted 4/29, but hg lower again today 4/30, may have to stop again if risk outweighs benefit    Rt lower lung Pulmonary embolism  - treatment heparing gtt.   - extensive discussion with family concerning risk/benefit of anticoagulation in history of recurrent UGI bleeding. We discussed issues that patient drop in Hg again 4/28. No hematemesis and no bowel movement today but remains concerning. They agree that they would like to hold the anticoagulation at this time until assess for GI bleeding.  Will monitor overnight but he may need to be restarted on heparin if Hg stable after transfusion.   -4/29: monitored overnight, appropriate increase in HG with transfusion, no reports of melena or hematemesis, EKG with sustained NSR, will restart heparin gtt at low intensity.   4/30: see LE DVT, may have to consider stopping again given hg downtrend again if continuse tomorrow    Patient's chronic/stable medical conditions noted in the assessment above will be managed with the patient's home medications as tolerated.      Diet:  Renal pleasure feeds,  TF  VTE PPx:  Heparin BID  Goals of Care:  Full code based on paperwork from NH.  Was DNR on previous admission.    4/25: sister updated on phone at 2:40pm    4/27: updated sister on phone. We discussed the significant complexity of medical issues and that my recommendation would be to begin a transition to hospice capacity. She will discuss further with her brother but no decisions made yet. We have discussed continuing heparin gtt in hospital for today but long term my recommendation is to avoid anticoagulation as he has long history of life threatening recurent GI bleeding.  I was very straight forward that he may not recover from this hospitalization due to significant comorbidities, infection, pulmonary embolism, heart failure, ESRD, bed bound, dementia with severe neurological issues related to prior ICH/stroke and malnutrition with sacral stage 2 wound.     4/28: updated sister at 4/28. We discussed his declot and plans for dialysis. I informed her that he needs 1U PRBC today as his Hg dropped. She agrees with holding the heparin gtt tonight to ensure no evidence of bleeding before restarting potentially in AM.  (recurrent hospital admissions for hematemesis). Again emphasized the appropriateness of transition to palliative. Patient with very poor quality of life.     4/30: updated sister on phone

## 2020-04-30 NOTE — PLAN OF CARE
Problem: Wound  Goal: Optimal Wound Healing  Outcome: Ongoing, Progressing     Problem: Fall Injury Risk  Goal: Absence of Fall and Fall-Related Injury  Outcome: Ongoing, Progressing     Problem: Adult Inpatient Plan of Care  Goal: Plan of Care Review  Outcome: Ongoing, Progressing  Goal: Patient-Specific Goal (Individualization)  Outcome: Ongoing, Progressing  Goal: Absence of Hospital-Acquired Illness or Injury  Outcome: Ongoing, Progressing  Goal: Optimal Comfort and Wellbeing  Outcome: Ongoing, Progressing  Goal: Readiness for Transition of Care  Outcome: Ongoing, Progressing  Goal: Rounds/Family Conference  Outcome: Ongoing, Progressing     Problem: Diabetes Comorbidity  Goal: Blood Glucose Level Within Desired Range  Outcome: Ongoing, Progressing     Problem: Skin Injury Risk Increased  Goal: Skin Health and Integrity  Outcome: Ongoing, Progressing     Problem: Device-Related Complication Risk (Hemodialysis)  Goal: Safe, Effective Therapy Delivery  Outcome: Ongoing, Progressing     Problem: Hemodynamic Instability (Hemodialysis)  Goal: Vital Signs Remain in Desired Range  Outcome: Ongoing, Progressing     Problem: Infection (Hemodialysis)  Goal: Absence of Infection Signs/Symptoms  Outcome: Ongoing, Progressing     Problem: Malnutrition  Goal: Improved Nutritional Intake  Outcome: Ongoing, Progressing     Problem: Coping Ineffective  Goal: Effective Coping  Outcome: Ongoing, Progressing     Problem: Wound  Goal: Optimal Wound Healing  Outcome: Ongoing, Progressing     Problem: Fall Injury Risk  Goal: Absence of Fall and Fall-Related Injury  Outcome: Ongoing, Progressing     Problem: Adult Inpatient Plan of Care  Goal: Plan of Care Review  Outcome: Ongoing, Progressing  Goal: Patient-Specific Goal (Individualization)  Outcome: Ongoing, Progressing  Goal: Absence of Hospital-Acquired Illness or Injury  Outcome: Ongoing, Progressing  Goal: Optimal Comfort and Wellbeing  Outcome: Ongoing, Progressing  Goal:  Readiness for Transition of Care  Outcome: Ongoing, Progressing  Goal: Rounds/Family Conference  Outcome: Ongoing, Progressing     Problem: Diabetes Comorbidity  Goal: Blood Glucose Level Within Desired Range  Outcome: Ongoing, Progressing     Problem: Skin Injury Risk Increased  Goal: Skin Health and Integrity  Outcome: Ongoing, Progressing     Problem: Device-Related Complication Risk (Hemodialysis)  Goal: Safe, Effective Therapy Delivery  Outcome: Ongoing, Progressing     Problem: Hemodynamic Instability (Hemodialysis)  Goal: Vital Signs Remain in Desired Range  Outcome: Ongoing, Progressing     Problem: Infection (Hemodialysis)  Goal: Absence of Infection Signs/Symptoms  Outcome: Ongoing, Progressing     Problem: Malnutrition  Goal: Improved Nutritional Intake  Outcome: Ongoing, Progressing     Problem: Coping Ineffective  Goal: Effective Coping  Outcome: Ongoing, Progressing

## 2020-04-30 NOTE — PROGRESS NOTES
Pt completed 3 hours dialysis via right arm fistula.  Needles removed and pressure held for 10 minutes.  Hemostasis achieved. New dressing applied and secured with tape.    Net removal 800cc, pt with low BP toward end of treatment, other wise tolerated well.  Report called to Fabriz[facility], waiting on escort to return pt to his room.

## 2020-04-30 NOTE — PROGRESS NOTES
Pt arrived to [facility] in his bed. Covid 19 isolation precautions will be maintained per protocol.  Pt wearing mask.  Dialysis started to right arm fistula with 15 gauge needles.  Secured and taped.  Pt tolerated without difficulty

## 2020-05-01 PROBLEM — R73.9 HYPERGLYCEMIA: Status: ACTIVE | Noted: 2020-01-01

## 2020-05-01 NOTE — PLAN OF CARE
05/01/20 1005   Post-Acute Status   Post-Acute Authorization Placement   Post-Acute Placement Status Awaiting Internal Medical Clearance

## 2020-05-01 NOTE — PROGRESS NOTES
Hospital Medicine  Progress Note  [facility]      Patient Name: Vaughn Retana  MRN:  [id number]  Hospital Medicine Team: [facility] HOSP MED K Charmaine Pedro MD  Date of Admission:  4/22/2020     Length of Stay:  LOS: 9 days     Principal Problem:  Covid-19 Virus Infection       History of Present Illness:    Mr. Vaughn Retana is a 54 y.o. male with hypertension, intracranial bleed, end-stage renal on dialysis MWF,L BKA 2/2 osteomyelitis, history of DVT, history of heart failure including pulmonary hypertension with normal EF (last 2017), hx upper GI bleeds, PEG status, seizures, latent TB presented from [facility] for evaluation of shortness of breath.  Patient isn't able to speak, so history is obtained via yes/no questions and ER documentation.  He denies any SOB, cough, fever, or chills and says he feels ok with no pain.  Of note, he just had an extensive hospitalization at [facility] from 3/28-4/15 for COVID.  At the end of his hospital course, he was transfused blood and decided to maintain a conservative approach so GI wasn't involved.  Additionally, he was DNR during the last hospital stay.    Upon arrival to the ER, vitals were temp 98.9F with HR 110s and satting 100% on 2L NC.  CXR showed improved consolidations from his recent hospitalization.  He was given Vanc and Zosyn and was admitted to Hospital Medicine for further management.    Interval History:  4/30:fevers again overnight, etiology unclear but infectious etiology has been negative with pretty extensive work up, cultures negative x 3, done again last night, cxr not showing new infiltrates, lives enzymes on downtrend, possible from clots in legs/lung that are known, crp is downtrendingto 167 now, monitoring now, glucose was in 200s, last a1c last year was 4, rechecking a1 now and on ssi, possible tube feeds but recheck dm status also. Replacing phos today, stop the phos binders as is very low at 1.6. Not talking  much today, sister said he has days like this.  Long term his quality of life is poor from all his medical issues, palliative has been helping with the family to see what the goals for him long term are. Acutely his hg has leveled off toay at 7.3, so no signs of gi bleeding or drop today that weve dealt with recently as well as 5 egds in last year, I let sister know he wasn't worse today, will ensure to stress chronically that long term overall, focusing on comfort and things that may keep him out of the hospital seems a more reasonable plan but they have wanted to continue will full treamtnet when discussed with previous provider and palliative in recent days.      4/29: patient remains tachycardic- EKG NSR today. Hg responded well to 1UPRBC administered at HD last night. No reports of overt bleeding. Patient remains most appropriate for hospice care but family without any decision toward palliative transition. Appreciate palliative team assistance.     4/28 + fever again AM. Re-dosed vancomycin and zosyn--cultures remain unremarkable. Repeat CXR and abdominal film without obvious source. Vancomycin random level therapeutic. CT head completed 4/27 without any acute changes. Repeat Lactate < 2. Hg stable. No leukocytosis present. Troponin elevated but plateaued in setting of ESRD. PEG tube looks healthy-no signs of infection. Stage 2 sacral wound present but unchanged--does not appear necrotic and no discharge to indicate this as source.     S/p declot 4/28 with vascular surgery. Have confirmed that dialysis will be scheduled for today and he can receive one UPRBC. Have stopped heparin gtt see conversation with family documented below for details--Hg drop in setting of recurrent GI bleeds. Discussed with family-decision to hold heparin at this time as they are more concerned about bleeding risk.     Previous Concern for G+ cocci in blood sample- but more likely contaminant as one bottle with cogulase negative staph.  Continue on zosyn for possible PNA but CXR improved. Hg low on admission--GI has evaluated, Hg responded to 1 UPRBC and no plan for elective procedure at this time. D-dimer at 6 now 4-- concern for GI bleeding risk. Concern for another infectious process than covid at this time.       Review of Systems:  Unable to obtain 2/2 aphasia      Past Medical History: Patient has a past medical history of Amputation stump pain (9/10/2013), Aspiration pneumonia (7/27/2015), Asterixis (11/8/2016), C. difficile colitis (8/7/2015), Cholelithiasis without obstruction (8/25/2015), Chronic diastolic heart failure, Chronic low back pain (12/1/2015), Closed head injury (9/8/2016), DVT (deep venous thrombosis) (7/28/2017), ESRD on hemodialysis (2/7/2013), GERD (gastroesophageal reflux disease), HCV antibody positive, Hemiparesis affecting left side as late effect of stroke (11/08/2016), History of Intracerebral Hemorrhage: L BG 5/2013; R BG 9/2016; R BG 11/2016; L caudate head 2/2017 (11/2/2016), Hypertension, left basal ganglia ICH 5/2013 (11/2/2016), Left Caudate Head ICH 2/22/2017 (2/24/2017), 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      Past Surgical History: Patient has a past surgical history that includes R AVF (9/12/12); Leg amputation through knee (12/18/2013); Foot amputation through metatarsal; Colonoscopy; Colonoscopy (N/A, 4/4/2017); Esophagogastroduodenoscopy (N/A, 6/12/2018); Upper gastrointestinal endoscopy; Esophagogastroduodenoscopy (N/A, 3/7/2019); Esophagogastroduodenoscopy (N/A, 9/23/2019); Esophagogastroduodenoscopy (N/A, 10/2/2019); Esophagogastroduodenoscopy (N/A,  11/12/2019); Esophagogastroduodenoscopy (N/A, 2/14/2020); Fistulogram (Right, 4/28/2020); and Declotting of vascular graft (N/A, 4/28/2020).      Social History: Patient reports that he has quit smoking. He has a 10.00 pack-year smoking history. He has never used smokeless tobacco. He reports that he does not drink alcohol or use drugs.      Family History: Patient's family history includes Alcohol abuse in his maternal grandmother; Diabetes in his brother and maternal grandfather; Early death in his mother; Heart disease in his father; Hyperlipidemia in his father; Hypertension in his father and sister; Kidney disease in his father.      Medications: Scheduled Meds:   [START ON 5/2/2020] sodium chloride 0.9%   Intravenous Once    albuterol  2 puff Inhalation Q6H    bisacodyL  10 mg Rectal Once    [START ON 5/2/2020] epoetin la nena-ebpx (RETACRIT) injection  50 Units/kg Intravenous Every Tues, Thurs, Sat    levetiracetam oral soln  250 mg Per G Tube BID    metoclopramide HCl  5 mg Per G Tube QID    pantoprozole (PROTONIX) IV  40 mg Intravenous Q12H    potassium, sodium phosphates  1 packet Per G Tube QID (AC & HS)    senna-docusate 8.6-50 mg  1 tablet Per G Tube BID     Continuous Infusions:   heparin (porcine) in D5W 12 Units/kg/hr (05/01/20 1406)     PRN Meds:.sodium chloride, sodium chloride 0.9%, [START ON 5/2/2020] sodium chloride 0.9%, acetaminophen, dextromethorphan-guaifenesin  mg/5 ml, Dextrose 10% Bolus, Dextrose 10% Bolus, Dextrose 10% Bolus, glucagon (human recombinant), glucose, glucose, heparin (PORCINE), heparin (PORCINE), insulin aspart U-100, insulin aspart U-100, melatonin, midodrine, ondansetron, sodium chloride 0.9%      Allergies: Patient is allergic to fosrenol [lanthanum].      Physical Exam:    Temp:  [98.8 °F (37.1 °C)-101.4 °F (38.6 °C)]   Pulse:  [110-127]   Resp:  [15-27]   BP: ()/(64-77)   SpO2:  [95 %-99 %]     Constitutional: Appears sickly chronically. Basic yes/no  answers at times, sometimes does not respond.  Head: Normocephalic and atraumatic.   Eyes: EOM are normal. Pupils are equal, round, and reactive to light. No scleral icterus.   Neck: Normal range of motion. Neck supple.   Cardiovascular: Normal heart rate.  Regular heart rhythm.  No murmur heard.  Pulmonary/Chest: Comfortable on 2L NC.  Coarse breath sounds throughout  Abdominal: Soft. Bowel sounds are normal.  No distension.  No tenderness  Musculoskeletal: Left BKA.  Right leg contractures  Neurological: Alert and making eye contact at times.  Intermittent answering questions  Skin: Skin is warm and dry.   Psychiatric: Normal mood and affect. Behavior is normal.       Laboratory:  Recent Labs   Lab 04/29/20  0714 04/30/20 0400 05/01/20  0202   WBC 9.62 7.79 10.30   LYMPH 9.1*  0.9* 8.9*  0.7* 11.7*  1.2   HGB 8.1* 7.0* 7.3*   HCT 26.6* 22.7* 23.9*    311 349     Recent Labs   Lab 04/29/20  0714 04/30/20  0400 05/01/20  0202    136 139   K 4.0 3.3* 3.5   CL 98 99 103   CO2 25 26 25   BUN 46* 60* 37*   CREATININE 3.1* 3.5* 3.2*   * 322* 296*   CALCIUM 9.7 8.9 9.5   MG 1.9 2.0 1.9   PHOS 2.9 1.7* 1.6*     Recent Labs   Lab 04/29/20  0714 04/29/20  1139 04/30/20 0400 05/01/20  0202   ALKPHOS 90  --  104 98   ALT 50*  --  69* 56*   *  --  123* 76*   ALBUMIN 1.4*  --  1.4* 1.4*   PROT 8.1  --  7.6 7.7   BILITOT 1.0  --  0.4 0.3   INR  --  1.2  --   --       Recent Labs     05/01/20  0202   .1*   LACTATE 1.5       All labs within the last 24 hours were reviewed.     Microbiology:  Lab Results   Component Value Date    [id number] Positive (A) 04/22/2020       Microbiology Results (last 7 days)     Procedure Component Value Units Date/Time    Blood culture [[id number]] Collected:  04/27/20 1039    Order Status:  Completed Specimen:  Blood Updated:  05/01/20 1222     Blood Culture, Routine No Growth after 4 days.     Narrative:       Collection has been rescheduled by SM3 at  04/27/2020 09:57 Reason:   Unable to collect  Collection has been rescheduled by SM3 at 04/27/2020 10:03 Reason:   Unable to collect  Collection has been rescheduled by SM3 at 04/27/2020 10:04 Reason:   spoke with ISMAEL Roland  Collection has been rescheduled by 3 at 04/27/2020 09:57 Reason:   Unable to collect  Collection has been rescheduled by SM3 at 04/27/2020 10:03 Reason:   Unable to collect  Collection has been rescheduled by SM3 at 04/27/2020 10:04 Reason:   spoke with ISMAEL Roland    Blood culture [[id number]] Collected:  05/01/20 0202    Order Status:  Completed Specimen:  Blood Updated:  05/01/20 0915     Blood Culture, Routine No Growth to date    Narrative:       Collection has been rescheduled by RF at 05/01/2020 00:10 Reason:   Unable to collect specimen x 3  Collection has been rescheduled by RF at 05/01/2020 00:31 Reason: ty   was notified to try this patient  Collection has been rescheduled by RF at 05/01/2020 00:10 Reason:   Unable to collect specimen x 3  Collection has been rescheduled by RF at 05/01/2020 00:31 Reason: ty   was notified to try this patient    Blood culture [[id number]] Collected:  05/01/20 0201    Order Status:  Completed Specimen:  Blood Updated:  05/01/20 0915     Blood Culture, Routine No Growth to date    Narrative:       Collection has been rescheduled by RF at 05/01/2020 00:10 Reason:   Unable to collect specimen x 3  Collection has been rescheduled by RF at 05/01/2020 00:31 Reason: ty   was notified to try this patient  Collection has been rescheduled by RF at 05/01/2020 00:10 Reason:   Unable to collect specimen x 3  Collection has been rescheduled by RF at 05/01/2020 00:31 Reason: ty   was notified to try this patient    Blood culture [[id number]] Collected:  04/25/20 0804    Order Status:  Completed Specimen:  Blood Updated:  04/29/20 1012     Blood Culture, Routine No Growth after 4 days.     Narrative:       ADD-ON CRP #[id number] Per. mitch Calzada MD  04/25/2020   "08:33     Blood culture x two cultures. Draw prior to antibiotics. [[id number]] Collected:  04/22/20 1849    Order Status:  Completed Specimen:  Blood from Peripheral, Antecubital, Left Updated:  04/26/20 2212     Blood Culture, Routine No Growth after 4 days.     Narrative:       Aerobic and anaerobic    Blood culture x two cultures. Draw prior to antibiotics. [[id number]]  (Abnormal) Collected:  04/22/20 1919    Order Status:  Completed Specimen:  Blood from Peripheral, Forearm, Left Updated:  04/26/20 0956     Blood Culture, Routine Gram stain miya bottle: Gram positive cocci in clusters resembling Staph       Results called to and read back by:Marco Lucero RN 04/24/2020  01:00      COAGULASE-NEGATIVE STAPHYLOCOCCUS SPECIES  Organism is a probable contaminant      Narrative:       Aerobic and anaerobic            Imaging  ECG Results          EKG 12-lead (Edited Result - FINAL)  Result time 04/23/20 12:54:59    Edited Result - FINAL by Interface, Lab In [facility] (04/23/20 12:54:59)                 Narrative:    Test Reason : R06.00,    Vent. Rate : 113 BPM     Atrial Rate : 113 BPM     P-R Int : 132 ms          QRS Dur : 094 ms      QT Int : 342 ms       P-R-T Axes : 082 067 083 degrees     QTc Int : 469 ms    Age and gender specific analysis  Sinus tachycardia  Incomplete right bundle branch block  Nonspecific T wave abnormality  Cannot exclude Lateral infarct  Abnormal ECG  When compared with ECG of 28-MAR-2020 13:40,  Left anterior fascicular block is no longer Present  lateral infarct is now present  Reconfirmed by KYLER AVERY MD ([id number]) on 4/23/2020 12:54:46 PM    Referred By: [email or web address]   SELF           Confirmed By:KYLER AVERY MD                              No results found for this or any previous visit.    X-Ray Chest 1 View  Narrative: EXAMINATION:  XR CHEST 1 VIEW    CLINICAL HISTORY:  Provided history is "sepsis, mildly tachypneic, COVID+;  ".    TECHNIQUE:  One view of the " chest.    COMPARISON:  Chest radiograph, 04/27/2020 and 04/22/2020.  CTA chest, 04/24/2020.    FINDINGS:  Cardiac wires overlie the chest.  Cardiomediastinal silhouette is not enlarged.  There is no focal consolidation.  No sizable pleural effusion.  No pneumothorax.  No detrimental change in lung aeration when compared with the prior study.  Impression: No focal consolidation or detrimental change when compared with 04/27/2020.    Electronically signed by: Byron Maynard MD  Date:    05/01/2020  Time:    00:38      All imaging within the last 24 hours was reviewed.       Assessment and Plan:    Active Hospital Problems    Diagnosis  POA    *COVID-19 virus infection [U07.1]  Yes    Hyperglycemia [R73.9]  Yes    Leukocytosis [D72.829]  Yes    Pulmonary embolism [I26.99]  Yes    Palliative care encounter [Z51.5]  Not Applicable    Advanced care planning/counseling discussion [Z71.89]  Not Applicable    Goals of care, counseling/discussion [Z71.89]  Not Applicable    ESRD on dialysis [N18.6, Z99.2]  Not Applicable    Pressure injury due to medical device [T85.898A, L89.90]  Yes    Seizure [R56.9]  Yes    Chronic blood loss anemia [D50.0]  Yes    Hemodialysis-associated hypotension [I95.3]  Yes    Severe malnutrition [E43]  Yes     Assessment and Plan  Severe Malnutrition in the context of Social/Environmental Circumstances     Related to (etiology):  Unknown etiology     Signs and Symptoms (as evidenced by):  Energy Intake: per pt, <75% intake over the last year  Body Fat Depletion: overall subcutaneous fat loss, moderate triceps fat loss and protruding patellas.  Muscle Mass Depletion:  moderate muscle wasting of interosseous, temporal, clavicle, calves and quadriceps  Weight Loss: 24% (39 lbs) x 1 year    Recommendations     Recommendation/Intervention: 1. Should pt be cleared for po diet, recommend renal diet with texture per SLP along with Novasource ONS TID. 2. Should pt require enteral feeding,  initiate Novasource renal formula at 10ml/hr and advance 10ml Q4hrs to goal rate of 40ml/hr (provides 1920kcal, 87g pro, 691ml free water). Provide additional 500ml water flush/24 hrs. Hold for residuals >500ml.  Goals: 1. Pt to receive nutrition < 48 hrs.      Erosive gastritis [K29.60]  Yes    Chronic upper GI bleeding [K92.2]  Yes    Chronic kidney disease-mineral and bone disorder [N18.9, E83.9, M89.9]  Yes     Chronic    Chronic diastolic heart failure [I50.32]  Yes     Chronic     2-23-17    1 - Low normal to mildly depressed left ventricular systolic function (EF 50-55%).     2 - Right ventricular enlargement with mildly depressed systolic function.     3 - Left ventricular diastolic dysfunction.     4 - Right atrial enlargement.     5 - Severe tricuspid regurgitation.     6 - Pulmonary hypertension. The estimated PA systolic pressure is 86 mmHg.     7 - Increased central venous pressure.       History of Intracerebral Hemorrhage: L BG 5/2013; R BG 9/2016; R BG 11/2016; L caudate head 2/2017 [Z86.79]  Not Applicable     Chronic    Acute respiratory failure with hypoxia [J96.01]  Yes    Stenosis of arteriovenous dialysis fistula [T82.858A]  No    Anemia in chronic kidney disease [N18.9, D63.1]  Yes     Chronic      Resolved Hospital Problems   No resolved problems to display.       Covid-19 Virus Infection  - COVID-19 testing: Positive on 3/25 and again on 4/22  - Isolation: Airborne/Droplet. Surgical mask on patient. Notify Infection Control  - Diagnostics: Trend Q48hrs if stable, more frequently if patient decompensating.  Lymphopenia, hyponatremia, hyperferritinemia, elevated troponin, elevated d-dimer, age, and comorbidities are significant predictors of poor clinical outcome)  o CBC:  WBC 15  o CMP:  ESRD on HD  o Ferritin:    o D-dimer:    o CRP:  270  o BNP:    o CPK:  957  o LDH:  226  o Troponin:  0.439  o Procalcitonin:  5.39  o CXR:  Improving consolidations  o Blood culture x2 4/22/2020  NGTD  o Sputum culture 4/22/2020 NGTD    - Management: Per [facility] COVID Treatment Protocol (4/15/20)    - Monitoring:   - Telemetry & Continuous Pulse Oximetry    - Nutrition:    - Multivitamin PO daily   - Add Boost supplement   - Vitamin D 1000IU daily if deficient   - Ascorbic acid 500mg PO bid    - Supportive Care:   - acetaminophen 650mg PO Q6hr PRN fever/headache   - loperamide PRN viral diarrhea   - IVF if indicated, restrictive strategy preferred, no maintenance IV if able   - VTE PPx: enoxaparin or heparin SQ unless contraindicated    - Antibiotics:  - if indications, CXR findings, elevated procal. See protocol for alternatives.   - Discontinue early if low concern for bacterial co-infection   - Vanc/Zosyn given COVID and recent hospitalization    Acute Hypoxemic Respiratory Failure  Acute Respiratory Disease 2/2 COVID19  - Order RT consult via Respiratory Communication for COVID Protocols  - Order Incentive Spirometer Q4h  - Order Flutter Valve Q4h  - Continuous Pulse Oximetry  - Goal SpO2 92-96%  - Supplemental O2 via LFNC, VentiMask, or HFNC (see Respiratory Support Oxygen Therapies)  - If wheezing, albuterol INH Q6h scheduled & PRN  - Proning Protocol if patient is a candidate (see HM Proning Protocol)   - GCS >13, able to self-prone  - If deterioration, may warrant trial of NIPPV and transfer to neg pressure room or immediate ICU consult  - CXR with multifocal opacities  - Satting 100% on 2L NC  - Continue Vanc/Zosyn given recent hospitalization currently, discuss with ID as difficulty finding source. Maybe blood clots?   - ID recs: complete 7 days zosyn (End date 4/29), continue to monitor clinically.   4/30: off anitbiotics now.    Chronic Upper GIB  Erosive Gastritis  · Reports of recurrent and chronic GIB  · Last EGD 2/14/20 with non-bleeding erosive gastropathy  · Continue PPI BID  · GI consult given severe drop in anemia, no plans for EGD currently  · 4/27: Hg 8 stable    · 4/28: Slight trend  downward in Hg 7.4 with tachycardia, transfuse 1UPRBC with HD.   · 4/30: downtrend again today to 7, may need transfusion tomorrow. No clear signs of leeding but known hx of upper bleeds, 5 EGDs in last year, last in feb, GI aware of patient nicole saavedra  · 5/1: stable at 7.3    Chronic Blood Loss Anemia  Anemia in CKD  · Hemoglobin 7.2 on admit with tachycardia  · Was 9 2 weeks ago at DC  · Type and screen ordered  · 4/28: Transfused 1 unit PRBCs  · 4/29: Repeat Hg this AM of 8 today  · 4/30: down to 7 again. Trend. .may need tx tomorrow if stays on this trend, no signs of activ ebleeding, BP is stable.  · 5/1: 7.3, stable    History of ICH  · 2013  · Resides at [facility]  · 4/28: Repeat CT scan today, no overt neurological changes but patient appears more lethargic.     Chronic Diastolic Heart Failure  · Chronic and stable  · Not on BP medications    HD Associated Hypotension  · Chronic issue  · Continue Midodrine with HD MWF    CKD Mineral and Bone Disorder  ESRD on HD  · Chronic and stable  · 5/1: stop renvela as phos is in the 1's, replace phos today.    Seizure Disorder  · Chronic and stable  · Continue Keppra 250mg BID  · Seizure precautions    Sacral Pressure Injury  · Wound Care consult    Lower Extremity DVT  - on heparin gtt currently  - due to high bleeding risk hesistant to commit patient to long term oral anticoagulation especially as this may be a chronic thrombus, heparin restarted 4/29, but hg lower again today 4/30, may have to stop again if risk outweighs benefit  5/1: hg stable so continuing    Rt lower lung Pulmonary embolism  - treatment heparing gtt.   - extensive discussion with family concerning risk/benefit of anticoagulation in history of recurrent UGI bleeding. We discussed issues that patient drop in Hg again 4/28. No hematemesis and no bowel movement today but remains concerning. They agree that they would like to hold the anticoagulation at this time until assess for GI bleeding.   Will monitor overnight but he may need to be restarted on heparin if Hg stable after transfusion.   -4/29: monitored overnight, appropriate increase in HG with transfusion, no reports of melena or hematemesis, EKG with sustained NSR, will restart heparin gtt at low intensity.   4/30: see LE DVT, may have to consider stopping again given hg downtrend again if continuse tomorrow  5/1: hg stable so continuing    Fever  -unclear etiology still, has had large workup in recent days, cultured many times without results besides 1 coag neg staph, treated but wasn't thought to be true infection, ID saw patient and thought it was clots as source, CRP downtrend  -continue to trend, work up again 5/1 without other causes seen as yet, clots seems likely fever source from work up as yet    Patient's chronic/stable medical conditions noted in the assessment above will be managed with the patient's home medications as tolerated.      Diet:  Renal pleasure feeds, TF  VTE PPx:  Heparin BID  Goals of Care:  Full code based on paperwork from NH.  Was DNR on previous admission.    4/25: sister updated on phone at 2:40pm    4/27: updated sister on phone. We discussed the significant complexity of medical issues and that my recommendation would be to begin a transition to hospice capacity. She will discuss further with her brother but no decisions made yet. We have discussed continuing heparin gtt in hospital for today but long term my recommendation is to avoid anticoagulation as he has long history of life threatening recurent GI bleeding.  I was very straight forward that he may not recover from this hospitalization due to significant comorbidities, infection, pulmonary embolism, heart failure, ESRD, bed bound, dementia with severe neurological issues related to prior ICH/stroke and malnutrition with sacral stage 2 wound.     4/28: updated sister at 4/28. We discussed his declot and plans for dialysis. I informed her that he needs 1U  PRBC today as his Hg dropped. She agrees with holding the heparin gtt tonight to ensure no evidence of bleeding before restarting potentially in AM.  (recurrent hospital admissions for hematemesis). Again emphasized the appropriateness of transition to palliative. Patient with very poor quality of life.     5/1: updated sister, ismael stokes has been discussing overall long term goals daily

## 2020-05-01 NOTE — PLAN OF CARE
Heparin adjusted by 2 units, now going at 9.2 units per @ 3.74ml/hr. Temperature decreased to 99.6 patient comfortable, will continue to monitor.

## 2020-05-01 NOTE — PROGRESS NOTES
When turning and bathing pt he was noted with dry hard callous to the right heel that was vert loose, washed his feet and fluid filled blister noted to the right heel and sone discoloration to the outside of the right foot, scratches noted to his right knee and silicone dressing in in place to the sacral area dressing removed and sheering noted beneath the dressing, reapplied. Pt said several times to leave him alone, but I spoke to pt and informed him I would be finished in a little while, he agreed. Bath completed, silicone dressing to the right heel, foot elevated on pillows to prevent any further injury, waffle mattress placed on the bed. PEG tube stoma is clean and dry, tolerating his tube feeding and flushes well. Pt has not made urine at all no BM yet this shift.

## 2020-05-01 NOTE — PROGRESS NOTES
Heparin gtt continues aPTT was 21.6 at 11am and dosage increased accordingly, Currently waiting for the aPTT drawn for 5 pm to result.

## 2020-05-01 NOTE — PROGRESS NOTES
When turning pt he was noted to have a scratch to the right ear, Pt noted to have long unkept fingernails, asked pt if I could trim his nails and he responded no. I educated him about the germs beneath his nails and he still declined, area cleansed and dry skin moisturizer applied.

## 2020-05-01 NOTE — PLAN OF CARE
[facility]  Palliative Care   Psychosocial Assessment    Patient Name: Vaughn Retana  MRN: [id number]  Admission Date: 4/22/2020  Hospital Length of Stay: 9 days  Code Status: Full Code   Attending Provider: Charmaine Pedro MD  Palliative Care Provider: MARY Bernstein, APRN, AGCNS/MARY Bragg, APRN, ACNS-BC  Primary Care Physician: Cori Randall MD  Principal Problem:COVID-19 virus infection    Reason for Referral: assistance with clarification of goals of care and psychosocial support  Consult Order Date: 4/24/20  Primary CM/SW: Travis Vora LMSW    Present during Interview: Spoke with pt's sister Alicia by phone..      Primary Language:English   Needed: no      Past Medical Situation:   PMH:   Past Medical History:   Diagnosis Date    Amputation stump pain 9/10/2013    Aspiration pneumonia 7/27/2015    Asterixis 11/8/2016    C. difficile colitis 8/7/2015    Cholelithiasis without obstruction 8/25/2015    Chronic diastolic heart failure     2-23-17   1 - Low normal to mildly depressed left ventricular systolic function (EF 50-55%).    2 - Right ventricular enlargement with mildly depressed systolic function.    3 - Left ventricular diastolic dysfunction.    4 - Right atrial enlargement.    5 - Severe tricuspid regurgitation.    6 - Pulmonary hypertension. The estimated PA systolic pressure is 86 mmHg.    7 - Increased central venous pressure.    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    Metabolic acidosis, IAG, reduced excretion of inorganic acids     Myoclonic jerking 2016    Noncompliance with medication regimen 2018    Secondary hyperparathyroidism (of renal origin)     Secondary pulmonary hypertension 3/23/2017    Stenosis of arteriovenous dialysis fistula 2014    TB lung, latent 2015    Negative Quantiferon Gold 3-23-17     Mental Health/Substance Use History: n/a  Risk of Abuse, neglect or exploitation: n/a  Current or Previous Trauma and/or evidence of PTSD: n/a  Non-traditional Health practices:     Understanding of diagnosis and prognosis: Will benefit from information regarding prognosis.  Experience/Comfort level with health care system:    Patients Mental Status:     Socio-Economic Factors/Resources:  Address: [address] [place] [address]  Phone Number: [phone] (home)     Marital Status:   Perceived impact on household composition: Family wants pt to return to [facility], which he has been since 2019.  Children: 1 daughter Aide Wise. Per sister, Dtr has "special needs" and is cared for by her Aunt.    Patient/Family perceptions about Caregiving Needs; availability and capacity: Family hopes pt will return to [facility]. Pt lived at [facility] for a year prior to [facility] and family was not happy with the care. Prior to this, pt lived with his sister Alicia.    Family Dynamics/Relationships: Supportive family. Sister reports pt has relationship with his daughter Aide, who lives with her Aunt and has "special needs".     Sister stated that pt was living with her when he was still able to "do for himself" and prior to getting sick so much. Sister and Sister's daughter Daisy, were main ones. Pt has three brothers, with one .    Patient/Family Strengths/Resilience: family " supportive  Patient/Family Coping Strategies: relies on sister    Activities of Daily Living: Assistance with ADLs  Support Systems-Family & Community (Home Health, HME etc): [facility], [facility]. [facility] in past.     Transportation:  relies on others    Work/Education History: Pt is disabled. 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  Self-Care Activities/Hobbies: Pt enjoyed fishing, wrestling, riding his bike, eating, and football     History: no    Financial Resources:Medicare. Medicaid      Advanced Care Planning & Legal Concerns:   Advanced Directives/Living Will: no  [place]/POLST: no   Planning:  no    Power of : no  Surrogate Decision Maker: Sister acts as decision-maker.     Emergency Contacts:  Sister: Alicia Retana: [phone]  Niece: Daisy Western: [phone]; [phone]    Spirituality, Culture & Coping Mechanisms:  F- Bindu and Belief: Yazidism     I - Importance: Not Zoroastrianism    C - Community/Culture Values:     A - Address in Care: Sister stated no need at this time      Goals/Hopes/Expectations: Sister hoping pt will get better.  Fears/Anxiety/Concerns:         Preferences about EOL Environment: (own bed, family nearby, pets, music, etc)  tbd    Complicated Bereavement Risk Assessment Tool (CBRAT)  Reference:  [facility] Clinical Practice Group (May 2016). Bereavement Risk Screening and Management Guidelines.  Retrieved from: http://www.[facility].com.au/wp-content/uploads//FPTMM-Ybqsgwhpxhw-Qvxozhtxx-and-Management-Guideline-2016.pdf      Bereaved Client Characteristics   ? Under 18      no  ? Was a Twin   no  ? Young Spouse   no  ? Elderly Spouse    no  ? Isolated    no  ? Lacks Meaningful Social Support   no  ? Dissatisfied with help available during illness   no  ? New to Financial [place] no  ? New to Decision-Making   no    Illness  ? Inherited  Disorder   no  ? Stigmatized Disease in the family/community   no  ? Lengthy/Burdensome   yes     Bereaved Client's History of Loss   ? Cumulative Multiple Losses   no  ? Previous Mental Health Illnesses   no  ? Current Mental Health Illness   no  ? Other Significant Health Issues   no   ? Migrant/Refugee   no Death  ? Sudden or Unexpected   no  ? Traumatic Circumstances Associated with Death   no  ? Significant Cultural/Social Burdens as a result of Death   no   Relationship with   ? Profound Lifelong Partner   no  ? Highly Dependent    no  ? Antagonistic   no  ? Ambivalent   no  ? Deeply Connected   no  ? Culturally Defined   no   Risk Factors Scores  0-2  Low  3-5  Moderate  5+  High  All persons scoring moderate to high presume to be at risk**    (** It is acknowledged that protective factors and resilience may outweigh apparent risk factors.      Total Risk Factors Score:   Mild to Moderate    Will benefit from continued follow up.       Discharge Planning Needs/Plan of Care:     Spoke with pt's sister. Began establishing rapport. Sister stated that she has support from family and doing ok. Sister hopeful as she spoke with MD today. Per sister, pt's bleeding issues seems to be improving. Support provided to sister.    Will continue to follow.       Concepcion Russo, OSBALDOW, ACHP-SW

## 2020-05-01 NOTE — CARE UPDATE
Rapid Response Nurse Chart Check     Chart check completed, abnormal VS noted. bedside RNReena contacted, no concerns verbalized at this time. Pt at baseline mental status  instructed to call [phone] for further concerns or assistance.

## 2020-05-02 NOTE — CODE/ RAPID DOCUMENTATION
Rapid Response Nurse Chart Check     Chart check completed, abnormal VS noted. Bedside RN, Tuesday contacted, no concerns verbalized at this time, instructed to call [phone] for further concerns or assistance.

## 2020-05-02 NOTE — PROGRESS NOTES
Maintenance hd tx initiated via RICHARD AVf, tolerated well, flows good. UF goal 1.5L. Isolation status maintained for Covid

## 2020-05-02 NOTE — PROGRESS NOTES
"Hospital Medicine  Progress Note  [facility]      Patient Name: Vaughn Retana  MRN:  [id number]  Hospital Medicine Team: [facility] K Charmaine Pedro MD  Date of Admission:  4/22/2020     Length of Stay:  LOS: 10 days     Principal Problem:  Covid-19 Virus Infection       History of Present Illness:    Mr. Vaughn Retana is a 54 y.o. male with hypertension, intracranial bleed, end-stage renal on dialysis MWF,L BKA 2/2 osteomyelitis, history of DVT, history of heart failure including pulmonary hypertension with normal EF (last 2017), hx upper GI bleeds, PEG status, seizures, latent TB presented from [facility] for evaluation of shortness of breath.  Patient isn't able to speak, so history is obtained via yes/no questions and ER documentation.  He denies any SOB, cough, fever, or chills and says he feels ok with no pain.  Of note, he just had an extensive hospitalization at [facility] from 3/28-4/15 for COVID.  At the end of his hospital course, he was transfused blood and decided to maintain a conservative approach so GI wasn't involved.  Additionally, he was DNR during the last hospital stay.    Upon arrival to the ER, vitals were temp 98.9F with HR 110s and satting 100% on 2L NC.  CXR showed improved consolidations from his recent hospitalization.  He was given Vanc and Zosyn and was admitted to Hospital Medicine for further management.    Interval History:      Afebrile overnight, AST/ALT continue to improve, HG table at 7.4, no signs of bleeding. On HD during exam, he only states "I wanna go home". Reasonable to see if he can be back at NH this week, have to discuss further what type of long term plans for him we would like with his sister, if we want to keep up full court press with aggressive care or focus more on less medical interventions given overall status and low functioning overall and patient seemingly very tired of being in hospital from what hes tleling me on " exam, the most clear thing he has said to me this whole time is bascally that he just doesn't want to be in the hospital anymore. The biggest decisoin will be, if we treat the DVTS/PE with oral anticoagulation and convert, and to what medication knowing he has a very easily upper gi bleeding potential. I left message for his sister detailing the above, and will call her tomorrow to discuss further before changing off drip to a potential coumadin vs FD lovenox renal dose vs eliquis vs no anticoag.      4/29: patient remains tachycardic- EKG NSR today. Hg responded well to 1UPRBC administered at HD last night. No reports of overt bleeding. Patient remains most appropriate for hospice care but family without any decision toward palliative transition. Appreciate palliative team assistance.     4/28 + fever again AM. Re-dosed vancomycin and zosyn--cultures remain unremarkable. Repeat CXR and abdominal film without obvious source. Vancomycin random level therapeutic. CT head completed 4/27 without any acute changes. Repeat Lactate < 2. Hg stable. No leukocytosis present. Troponin elevated but plateaued in setting of ESRD. PEG tube looks healthy-no signs of infection. Stage 2 sacral wound present but unchanged--does not appear necrotic and no discharge to indicate this as source.     S/p declot 4/28 with vascular surgery. Have confirmed that dialysis will be scheduled for today and he can receive one UPRBC. Have stopped heparin gtt see conversation with family documented below for details--Hg drop in setting of recurrent GI bleeds. Discussed with family-decision to hold heparin at this time as they are more concerned about bleeding risk.     Previous Concern for G+ cocci in blood sample- but more likely contaminant as one bottle with cogulase negative staph. Continue on zosyn for possible PNA but CXR improved. Hg low on admission--GI has evaluated, Hg responded to 1 UPRBC and no plan for elective procedure at this time.  D-dimer at 6 now 4-- concern for GI bleeding risk. Concern for another infectious process than covid at this time.       Review of Systems:  Unable to obtain 2/2 aphasia      Past Medical History: Patient has a past medical history of Amputation stump pain (9/10/2013), Aspiration pneumonia (7/27/2015), Asterixis (11/8/2016), C. difficile colitis (8/7/2015), Cholelithiasis without obstruction (8/25/2015), Chronic diastolic heart failure, Chronic low back pain (12/1/2015), Closed head injury (9/8/2016), DVT (deep venous thrombosis) (7/28/2017), ESRD on hemodialysis (2/7/2013), GERD (gastroesophageal reflux disease), HCV antibody positive, Hemiparesis affecting left side as late effect of stroke (11/08/2016), History of Intracerebral Hemorrhage: L BG 5/2013; R BG 9/2016; R BG 11/2016; L caudate head 2/2017 (11/2/2016), Hypertension, left basal ganglia ICH 5/2013 (11/2/2016), Left Caudate Head ICH 2/22/2017 (2/24/2017), 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      Past Surgical History: Patient has a past surgical history that includes R AVF (9/12/12); Leg amputation through knee (12/18/2013); Foot amputation through metatarsal; Colonoscopy; Colonoscopy (N/A, 4/4/2017); Esophagogastroduodenoscopy (N/A, 6/12/2018); Upper gastrointestinal endoscopy; Esophagogastroduodenoscopy (N/A, 3/7/2019); Esophagogastroduodenoscopy (N/A, 9/23/2019); Esophagogastroduodenoscopy (N/A, 10/2/2019); Esophagogastroduodenoscopy (N/A, 11/12/2019); Esophagogastroduodenoscopy (N/A, 2/14/2020); Fistulogram (Right, 4/28/2020); and Declotting of vascular graft (N/A, 4/28/2020).      Social History:  Patient reports that he has quit smoking. He has a 10.00 pack-year smoking history. He has never used smokeless tobacco. He reports that he does not drink alcohol or use drugs.      Family History: Patient's family history includes Alcohol abuse in his maternal grandmother; Diabetes in his brother and maternal grandfather; Early death in his mother; Heart disease in his father; Hyperlipidemia in his father; Hypertension in his father and sister; Kidney disease in his father.      Medications: Scheduled Meds:   albuterol  2 puff Inhalation Q6H    bisacodyL  10 mg Rectal Once    epoetin la nena-ebpx (RETACRIT) injection  50 Units/kg Intravenous Every Tues, Thurs, Sat    levetiracetam oral soln  250 mg Per G Tube BID    metoclopramide HCl  5 mg Per G Tube QID    pantoprozole (PROTONIX) IV  40 mg Intravenous Q12H    senna-docusate 8.6-50 mg  1 tablet Per G Tube BID     Continuous Infusions:   heparin (porcine) in D5W 18 Units/kg/hr (05/02/20 0228)     PRN Meds:.sodium chloride, sodium chloride 0.9%, sodium chloride 0.9%, acetaminophen, dextromethorphan-guaifenesin  mg/5 ml, Dextrose 10% Bolus, Dextrose 10% Bolus, glucose, glucose, heparin (PORCINE), heparin (PORCINE), insulin aspart U-100, melatonin, midodrine, ondansetron, sodium chloride 0.9%      Allergies: Patient is allergic to fosrenol [lanthanum].      Physical Exam:    Temp:  [97.8 °F (36.6 °C)-99.3 °F (37.4 °C)]   Pulse:  [105-120]   Resp:  [13-21]   BP: ()/(56-85)   SpO2:  [95 %-99 %]     Constitutional: Appears sickly chronically. Basic yes/no answers at times, sometimes does not respond.  Head: Normocephalic and atraumatic.   Eyes: EOM are normal. Pupils are equal, round, and reactive to light. No scleral icterus.   Neck: Normal range of motion. Neck supple.   Cardiovascular: Normal heart rate.  Regular heart rhythm.  No murmur heard.  Pulmonary/Chest: Comfortable on 2L NC.  Coarse breath sounds throughout  Abdominal: Soft. Bowel sounds are  normal.  No distension.  No tenderness  Musculoskeletal: Left BKA.  Right leg contractures  Neurological: Alert and making eye contact at times.  Intermittent answering questions  Skin: Skin is warm and dry.   Psychiatric: Normal mood and affect. Behavior is normal.       Laboratory:  Recent Labs   Lab 04/30/20 0400 05/01/20 0202 05/02/20 0337   WBC 7.79 10.30 11.04   LYMPH 8.9*  0.7* 11.7*  1.2 10.6*  1.2   HGB 7.0* 7.3* 7.4*   HCT 22.7* 23.9* 24.0*    349 323     Recent Labs   Lab 04/29/20  0714 04/30/20 0400 05/01/20 0202 05/02/20 0337    136 139 135*   K 4.0 3.3* 3.5 3.5   CL 98 99 103 99   CO2 25 26 25 25   BUN 46* 60* 37* 62*   CREATININE 3.1* 3.5* 3.2* 4.5*   * 322* 296* 301*   CALCIUM 9.7 8.9 9.5 9.8   MG 1.9 2.0 1.9  --    PHOS 2.9 1.7* 1.6* 3.6     Recent Labs   Lab 04/29/20  1139 04/30/20 0400 05/01/20 0202 05/02/20 0337   ALKPHOS  --  104 98 125   ALT  --  69* 56* 47*   AST  --  123* 76* 57*   ALBUMIN  --  1.4* 1.4* 1.3*   PROT  --  7.6 7.7 7.7   BILITOT  --  0.4 0.3 0.3   INR 1.2  --   --   --       Recent Labs     05/01/20 0202   .1*   LACTATE 1.5       All labs within the last 24 hours were reviewed.     Microbiology:  Lab Results   Component Value Date    [id number] Positive (A) 04/22/2020       Microbiology Results (last 7 days)     Procedure Component Value Units Date/Time    Blood culture [[id number]] Collected:  05/01/20 0202    Order Status:  Completed Specimen:  Blood Updated:  05/02/20 0613     Blood Culture, Routine No Growth to date      No Growth to date    Narrative:       Collection has been rescheduled by RF at 05/01/2020 00:10 Reason:   Unable to collect specimen x 3  Collection has been rescheduled by RF at 05/01/2020 00:31 Reason: ty   was notified to try this patient  Collection has been rescheduled by RF at 05/01/2020 00:10 Reason:   Unable to collect specimen x 3  Collection has been rescheduled by RF at 05/01/2020 00:31 Reason: ty   was  notified to try this patient    Blood culture [[id number]] Collected:  05/01/20 0201    Order Status:  Completed Specimen:  Blood Updated:  05/02/20 0613     Blood Culture, Routine No Growth to date      No Growth to date    Narrative:       Collection has been rescheduled by RF at 05/01/2020 00:10 Reason:   Unable to collect specimen x 3  Collection has been rescheduled by RF at 05/01/2020 00:31 Reason: ty   was notified to try this patient  Collection has been rescheduled by RF at 05/01/2020 00:10 Reason:   Unable to collect specimen x 3  Collection has been rescheduled by RF at 05/01/2020 00:31 Reason: ty   was notified to try this patient    Blood culture [[id number]] Collected:  04/27/20 1039    Order Status:  Completed Specimen:  Blood Updated:  05/01/20 1222     Blood Culture, Routine No Growth after 4 days.     Narrative:       Collection has been rescheduled by [facility] at 04/27/2020 09:57 Reason:   Unable to collect  Collection has been rescheduled by 3 at 04/27/2020 10:03 Reason:   Unable to collect  Collection has been rescheduled by 3 at 04/27/2020 10:04 Reason:   spoke with ISMAEL Roland  Collection has been rescheduled by 3 at 04/27/2020 09:57 Reason:   Unable to collect  Collection has been rescheduled by 3 at 04/27/2020 10:03 Reason:   Unable to collect  Collection has been rescheduled by 3 at 04/27/2020 10:04 Reason:   spoke with ISMAEL Roland    Blood culture [[id number]] Collected:  04/25/20 0804    Order Status:  Completed Specimen:  Blood Updated:  04/29/20 1012     Blood Culture, Routine No Growth after 4 days.     Narrative:       ADD-ON CRP #[id number] Per. mitch Calzada MD  04/25/2020  08:33     Blood culture x two cultures. Draw prior to antibiotics. [[id number]] Collected:  04/22/20 1849    Order Status:  Completed Specimen:  Blood from Peripheral, Antecubital, Left Updated:  04/26/20 2212     Blood Culture, Routine No Growth after 4 days.     Narrative:       Aerobic and anaerobic    Blood  "culture x two cultures. Draw prior to antibiotics. [[id number]]  (Abnormal) Collected:  04/22/20 1919    Order Status:  Completed Specimen:  Blood from Peripheral, Forearm, Left Updated:  04/26/20 0956     Blood Culture, Routine Gram stain miya bottle: Gram positive cocci in clusters resembling Staph       Results called to and read back by:Marco Lucero RN 04/24/2020  01:00      COAGULASE-NEGATIVE STAPHYLOCOCCUS SPECIES  Organism is a probable contaminant      Narrative:       Aerobic and anaerobic            Imaging  ECG Results          EKG 12-lead (Edited Result - FINAL)  Result time 04/23/20 12:54:59    Edited Result - FINAL by Interface, Lab In [facility] (04/23/20 12:54:59)                 Narrative:    Test Reason : R06.00,    Vent. Rate : 113 BPM     Atrial Rate : 113 BPM     P-R Int : 132 ms          QRS Dur : 094 ms      QT Int : 342 ms       P-R-T Axes : 082 067 083 degrees     QTc Int : 469 ms    Age and gender specific analysis  Sinus tachycardia  Incomplete right bundle branch block  Nonspecific T wave abnormality  Cannot exclude Lateral infarct  Abnormal ECG  When compared with ECG of 28-MAR-2020 13:40,  Left anterior fascicular block is no longer Present  lateral infarct is now present  Reconfirmed by KYLER AVERY MD ([id number]) on 4/23/2020 12:54:46 PM    Referred By: [email or web address]   SELF           Confirmed By:KYLER AVERY MD                              No results found for this or any previous visit.    X-Ray Chest 1 View  Narrative: EXAMINATION:  XR CHEST 1 VIEW    CLINICAL HISTORY:  Provided history is "sepsis, mildly tachypneic, COVID+;  ".    TECHNIQUE:  One view of the chest.    COMPARISON:  Chest radiograph, 04/27/2020 and 04/22/2020.  CTA chest, 04/24/2020.    FINDINGS:  Cardiac wires overlie the chest.  Cardiomediastinal silhouette is not enlarged.  There is no focal consolidation.  No sizable pleural effusion.  No pneumothorax.  No detrimental change in lung aeration when compared with the " prior study.  Impression: No focal consolidation or detrimental change when compared with 04/27/2020.    Electronically signed by: Byron Maynard MD  Date:    05/01/2020  Time:    00:38      All imaging within the last 24 hours was reviewed.       Assessment and Plan:    Active Hospital Problems    Diagnosis  POA    *COVID-19 virus infection [U07.1]  Yes    Hyperglycemia [R73.9]  Yes    Leukocytosis [D72.829]  Yes    Pulmonary embolism [I26.99]  Yes    Palliative care encounter [Z51.5]  Not Applicable    Advanced care planning/counseling discussion [Z71.89]  Not Applicable    Goals of care, counseling/discussion [Z71.89]  Not Applicable    ESRD on dialysis [N18.6, Z99.2]  Not Applicable    Pressure injury due to medical device [T85.898A, L89.90]  Yes    Seizure [R56.9]  Yes    Chronic blood loss anemia [D50.0]  Yes    Hemodialysis-associated hypotension [I95.3]  Yes    Severe malnutrition [E43]  Yes     Assessment and Plan  Severe Malnutrition in the context of Social/Environmental Circumstances     Related to (etiology):  Unknown etiology     Signs and Symptoms (as evidenced by):  Energy Intake: per pt, <75% intake over the last year  Body Fat Depletion: overall subcutaneous fat loss, moderate triceps fat loss and protruding patellas.  Muscle Mass Depletion:  moderate muscle wasting of interosseous, temporal, clavicle, calves and quadriceps  Weight Loss: 24% (39 lbs) x 1 year    Recommendations     Recommendation/Intervention: 1. Should pt be cleared for po diet, recommend renal diet with texture per SLP along with Novasource ONS TID. 2. Should pt require enteral feeding, initiate Novasource renal formula at 10ml/hr and advance 10ml Q4hrs to goal rate of 40ml/hr (provides 1920kcal, 87g pro, 691ml free water). Provide additional 500ml water flush/24 hrs. Hold for residuals >500ml.  Goals: 1. Pt to receive nutrition < 48 hrs.      Erosive gastritis [K29.60]  Yes    Chronic upper GI bleeding [K92.2]   Yes    Chronic kidney disease-mineral and bone disorder [N18.9, E83.9, M89.9]  Yes     Chronic    Chronic diastolic heart failure [I50.32]  Yes     Chronic     2-23-17    1 - Low normal to mildly depressed left ventricular systolic function (EF 50-55%).     2 - Right ventricular enlargement with mildly depressed systolic function.     3 - Left ventricular diastolic dysfunction.     4 - Right atrial enlargement.     5 - Severe tricuspid regurgitation.     6 - Pulmonary hypertension. The estimated PA systolic pressure is 86 mmHg.     7 - Increased central venous pressure.       History of Intracerebral Hemorrhage: L BG 5/2013; R BG 9/2016; R BG 11/2016; L caudate head 2/2017 [Z86.79]  Not Applicable     Chronic    Acute respiratory failure with hypoxia [J96.01]  Yes    Stenosis of arteriovenous dialysis fistula [T82.858A]  No    Anemia in chronic kidney disease [N18.9, D63.1]  Yes     Chronic      Resolved Hospital Problems   No resolved problems to display.       Covid-19 Virus Infection  - COVID-19 testing: Positive on 3/25 and again on 4/22  - Isolation: Airborne/Droplet. Surgical mask on patient. Notify Infection Control  - Diagnostics: Trend Q48hrs if stable, more frequently if patient decompensating.  Lymphopenia, hyponatremia, hyperferritinemia, elevated troponin, elevated d-dimer, age, and comorbidities are significant predictors of poor clinical outcome)  o CBC:  WBC 15  o CMP:  ESRD on HD  o Ferritin:    o D-dimer:    o CRP:  270  o BNP:    o CPK:  957  o LDH:  226  o Troponin:  0.439  o Procalcitonin:  5.39  o CXR:  Improving consolidations  o Blood culture x2 4/22/2020 NGTD  o Sputum culture 4/22/2020 NGTD    - Management: Per [facility] COVID Treatment Protocol (4/15/20)    - Monitoring:   - Telemetry & Continuous Pulse Oximetry    - Nutrition:    - Multivitamin PO daily   - Add Boost supplement   - Vitamin D 1000IU daily if deficient   - Ascorbic acid 500mg PO bid    - Supportive Care:   -  acetaminophen 650mg PO Q6hr PRN fever/headache   - loperamide PRN viral diarrhea   - IVF if indicated, restrictive strategy preferred, no maintenance IV if able   - VTE PPx: enoxaparin or heparin SQ unless contraindicated    - Antibiotics:  - if indications, CXR findings, elevated procal. See protocol for alternatives.   - Discontinue early if low concern for bacterial co-infection   - Vanc/Zosyn given COVID and recent hospitalization    Acute Hypoxemic Respiratory Failure  Acute Respiratory Disease 2/2 COVID19  - Order RT consult via Respiratory Communication for COVID Protocols  - Order Incentive Spirometer Q4h  - Order Flutter Valve Q4h  - Continuous Pulse Oximetry  - Goal SpO2 92-96%  - Supplemental O2 via LFNC, VentiMask, or HFNC (see Respiratory Support Oxygen Therapies)  - If wheezing, albuterol INH Q6h scheduled & PRN  - Proning Protocol if patient is a candidate (see [facility] Proning Protocol)   - GCS >13, able to self-prone  - If deterioration, may warrant trial of NIPPV and transfer to neg pressure room or immediate ICU consult  - CXR with multifocal opacities  - Satting 100% on 2L NC  - Continue Vanc/Zosyn given recent hospitalization currently, discuss with ID as difficulty finding source. Maybe blood clots?   - ID recs: complete 7 days zosyn (End date 4/29), continue to monitor clinically.   4/30: off anitbiotics now.    Chronic Upper GIB  Erosive Gastritis  · Reports of recurrent and chronic GIB  · Last EGD 2/14/20 with non-bleeding erosive gastropathy  · Continue PPI BID  · GI consult given severe drop in anemia, no plans for EGD currently  · 4/27: Hg 8 stable    · 4/28: Slight trend downward in Hg 7.4 with tachycardia, transfuse 1UPRBC with HD.   · 4/30: downtrend again today to 7, may need transfusion tomorrow. No clear signs of leeding but known hx of upper bleeds, 5 EGDs in last year, last in feb, GI aware of patient thi sadmit  · 5/1: stable at 7.3    Chronic Blood Loss Anemia  Anemia in  CKD  · Hemoglobin 7.2 on admit with tachycardia  · Was 9 2 weeks ago at DC  · Type and screen ordered  · 4/28: Transfused 1 unit PRBCs  · 4/29: Repeat Hg this AM of 8 today  · 4/30: down to 7 again. Trend. .may need tx tomorrow if stays on this trend, no signs of activ ebleeding, BP is stable.  · 5/1: 7.3, stable    History of ICH  · 2013  · Resides at [facility]  · 4/28: Repeat CT scan today, no overt neurological changes but patient appears more lethargic.     Chronic Diastolic Heart Failure  · Chronic and stable  · Not on BP medications    HD Associated Hypotension  · Chronic issue  · Continue Midodrine with HD MWF    CKD Mineral and Bone Disorder  ESRD on HD  · Chronic and stable  · 5/1: stop renvela as phos is in the 1's, replace phos today.    Seizure Disorder  · Chronic and stable  · Continue Keppra 250mg BID  · Seizure precautions    Sacral Pressure Injury  · Wound Care consult    Lower Extremity DVT  - on heparin gtt currently  - due to high bleeding risk hesistant to commit patient to long term oral anticoagulation especially as this may be a chronic thrombus, heparin restarted 4/29, but hg lower again today 4/30, may have to stop again if risk outweighs benefit  5/1: hg stable so continuing  52: need to decide long term planning, oral meds vs lovenox full dose vs do we anticoag     Rt lower lung Pulmonary embolism  - treatment heparing gtt.   - extensive discussion with family concerning risk/benefit of anticoagulation in history of recurrent UGI bleeding. We discussed issues that patient drop in Hg again 4/28. No hematemesis and no bowel movement today but remains concerning. They agree that they would like to hold the anticoagulation at this time until assess for GI bleeding.  Will monitor overnight but he may need to be restarted on heparin if Hg stable after transfusion.   -4/29: monitored overnight, appropriate increase in HG with transfusion, no reports of melena or hematemesis, EKG with  sustained NSR, will restart heparin gtt at low intensity.   4/30: see LE DVT, may have to consider stopping again given hg downtrend again if continuse tomorrow  5/1: hg stable so continuing  5/2: long term planning, will dicsuss with sister    Fever  -unclear etiology still, has had large workup in recent days, cultured many times without results besides 1 coag neg staph, treated but wasn't thought to be true infection, ID saw patient and thought it was clots as source, CRP downtrend  -continue to trend, work up again 5/1 without other causes seen as yet, clots seems likely fever source from work up as yet    Patient's chronic/stable medical conditions noted in the assessment above will be managed with the patient's home medications as tolerated.      Diet:  Renal pleasure feeds, TF  VTE PPx:  Heparin BID  Goals of Care:  Full code based on paperwork from NH.  Was DNR on previous admission.    4/25: sister updated on phone at 2:40pm    4/27: updated sister on phone. We discussed the significant complexity of medical issues and that my recommendation would be to begin a transition to hospice capacity. She will discuss further with her brother but no decisions made yet. We have discussed continuing heparin gtt in hospital for today but long term my recommendation is to avoid anticoagulation as he has long history of life threatening recurent GI bleeding.  I was very straight forward that he may not recover from this hospitalization due to significant comorbidities, infection, pulmonary embolism, heart failure, ESRD, bed bound, dementia with severe neurological issues related to prior ICH/stroke and malnutrition with sacral stage 2 wound.     4/28: updated sister at 4/28. We discussed his declot and plans for dialysis. I informed her that he needs 1U PRBC today as his Hg dropped. She agrees with holding the heparin gtt tonight to ensure no evidence of bleeding before restarting potentially in AM.  ([facility] hospital  admissions for hematemesis). Again emphasized the appropriateness of transition to palliative. Patient with very poor quality of life.     5/1: updated sister, palliative kike has been discussing overall long term goals daily  5/2: patient anxious for dc, will discuss in what capacity with sister more tmorrow, need anticoag decision is biggest thing now to decide for dc and the spectrum of care we will focus on for him at [facility]

## 2020-05-02 NOTE — PROGRESS NOTES
[facility] H[facility]ODIAL[facility] NOTE     Patient currently on Hemodialyiss for removal of uremic toxins and volume.     Patient seen and evaluated on hemodialysis, tolerating treatment, see HD flowsheet for vitals and assessments.      Ultrafiltration goal is 2.5L     Labs have been reviewed and the dialysate bath has been adjusted.     Assessment/Plan:  Seen on HD this morning, tolerating well.  Oxygenating well on RA. Net positive ~ 2.4L In the past 24 hours  Will continue iHD while in-patient  Will add-on Phos level this AM 2/2 hypophosphatemia.  Anticipate some clearance with HD today.    Continue strict I/O's  EPO started this week, monitor h/h.    VANITA Valentine, FNP-BC  Nephrology  Pager:  [phone]

## 2020-05-02 NOTE — PLAN OF CARE
Problem: Wound  Goal: Optimal Wound Healing  Outcome: Ongoing, Progressing     Problem: Fall Injury Risk  Goal: Absence of Fall and Fall-Related Injury  Outcome: Ongoing, Progressing     Problem: Adult Inpatient Plan of Care  Goal: Plan of Care Review  Outcome: Ongoing, Progressing  Goal: Patient-Specific Goal (Individualization)  Outcome: Ongoing, Progressing  Goal: Absence of Hospital-Acquired Illness or Injury  Outcome: Ongoing, Progressing  Goal: Optimal Comfort and Wellbeing  Outcome: Ongoing, Progressing  Goal: Readiness for Transition of Care  Outcome: Ongoing, Progressing  Goal: Rounds/Family Conference  Outcome: Ongoing, Progressing     Problem: Diabetes Comorbidity  Goal: Blood Glucose Level Within Desired Range  Outcome: Ongoing, Progressing     Problem: Skin Injury Risk Increased  Goal: Skin Health and Integrity  Outcome: Ongoing, Progressing     Problem: Device-Related Complication Risk (Hemodialysis)  Goal: Safe, Effective Therapy Delivery  Outcome: Ongoing, Progressing     Problem: Hemodynamic Instability (Hemodialysis)  Goal: Vital Signs Remain in Desired Range  Outcome: Ongoing, Progressing     Problem: Infection (Hemodialysis)  Goal: Absence of Infection Signs/Symptoms  Outcome: Ongoing, Progressing     Problem: Malnutrition  Goal: Improved Nutritional Intake  Outcome: Ongoing, Progressing     Problem: Coping Ineffective  Goal: Effective Coping  Outcome: Ongoing, Progressing     Pt lying in bed resting with hob elevated  . He is AAOx1  and vs stable are stab;e at this time. Heparin infusing at 7.2 units /hr and bleeding precautions in place .  Nova source enteral feeding infusing at 35 ml/hour  via peg tube.  Repositioned q 2 hours via wedge. Safety maintained will continue to monitor

## 2020-05-02 NOTE — PROGRESS NOTES
Pt returned from dialysis awake and speaking several words, turned and made comfortable, right leg with some contracture positioned with a pillow to prevent pressure.

## 2020-05-02 NOTE — PROGRESS NOTES
HD tx complete, 1408ml removed in a tx of 3 hours, tolerated well. Blood returned via RICHARD AVF, 15g needles removed x2, gauze and tape applied, pressure held to each site for 10 minutes, hemostasis achieved. Report given to Stephanie GUEVARA

## 2020-05-02 NOTE — PROGRESS NOTES
Pt left the unit this morning for Dialysis in his bed with surgical mask in place, heparin gtt  and tube feeding infusing as ordered transferred with pt.

## 2020-05-03 NOTE — PLAN OF CARE
Pt cont to be only oriented to self. Heparin drip has been d/c d/t theraputic level and iv line was flushed. He remains free of falls or injuries. Wounds cont to be healing and he is turned at least every 2 hours. Pillows and wedges are used for comfort and positioning. No s/s of pain or discomfort. No sob is noted.  Bed is low and locked with call light in reach.

## 2020-05-03 NOTE — CARE UPDATE
Rapid Response Nurse Chart Check     Chart check completed, abnormal VS noted. , temp 100.8 at 1925. Tylenol given. Please call [phone] for further concerns or assistance.

## 2020-05-03 NOTE — PLAN OF CARE
"    Discussed with sister about blood thinner risk long term with orals vs full dose lovenox at NH without daily labs, etc and the risks of this in setting of PE/DVTS vs the frequent upper gi bleeds he has (5 egs in last year). We went through all the options and ultimamtely, decided to defer anticoagulation now, his hg is stable in mid 7's but any gi bleed even previous to this  (5 life threatning ones prior thi year alone last in February) and now on an anticoagulant I think could easily be life ending especially with a low hg to start from CKD, recent bleeds, etc.  The risk of PE, DVT causing issues without anticoagulatino is also a concern, however he has had this clot now and 5 life threatning bleeds in a year. I think the risk of a life ending bleed is higher than the risk of that from the PE,DVT/ but we reviewed at length that they both have life threatning risk of either to treat or not to treat.   We opted to stop heparin drip and go to NH without anticoaguation. If he starts having issues with oxygenation, worsening leg swelling and ultimately clots are worse in future, as body should reabsorb them with time also itself, it can be reassesse dthe risk/benefit we discussed. He is on room air now, not having resp distress.   discsused goals of care now, discussed is on no large medication regimen that would be distressing to be on. meds are reasonable. Has good/bad days now but overall he seems to be "over" hospital stays which I told her he has displayed to me now and yesterday. I stated if an event happened in future like a covid, or another major illness in him, he has indicated he seems that he would want to be in his home environment ([facility]) and I would strongly consider doing palliative here where you fcous on things to bring quality to his life rather than another hospital stay which he indicated to me is distressing. I asked if she was interetsd in that now on return to [facility] but she would " like to continue what we have been doing now there, and if situation arises again, will rethink the goals at that time.  She would like to try a skype with his daughter to boost his spirits today or tomorrow.    Will ask NATTY/RUSTY about dc back to [facility] tomorrow for him back to residential.

## 2020-05-03 NOTE — PROGRESS NOTES
"Hospital Medicine  Progress Note  [facility]      Patient Name: Vaughn Retana  MRN:  [id number]  Hospital Medicine Team: [facility]OS[facility] MED K Charmaine Pedro MD  Date of Admission:  4/22/2020     Length of Stay:  LOS: 11 days     Principal Problem:  Covid-19 Virus Infection       History of Present Illness:    Mr. Vaughn Retana is a 54 y.o. 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  Patient isn't able to speak, so history is obtained via yes/no questions and ER documentation.  He denies any SOB, cough, fever, or chills and says he feels ok with no pain.  Of note, he just had an extensive hospitalization at [facility] from 3/28-4/15 for COVID.  At the end of his hospital course, he was transfused blood and decided to maintain a conservative approach so GI wasn't involved.  Additionally, he was DNR during the last hospital stay.    Upon arrival to the ER, vitals were temp 98.9F with HR 110s and satting 100% on 2L NC.  CXR showed improved consolidations from his recent hospitalization.  He was given Vanc and Zosyn and was admitted to Hospital Medicine for further management.    Interval History:      Denies complaints, more talkative, all he says is "I want to go hme, I want out of here". I told him id talk to sister to devise a plan, we just have to decide some of his meications, blood thinner decisions to be able to let him go back to [facility] now, ast/alt improving, crp stable, hg stable. On room air.  Will call his sister to discuss further today.      4/29: patient remains tachycardic- EKG NSR today. Hg responded well to 1UPRBC administered at HD last night. No reports of overt bleeding. Patient remains most " appropriate for hospice care but family without any decision toward palliative transition. Appreciate palliative team assistance.     4/28 + fever again AM. Re-dosed vancomycin and zosyn--cultures remain unremarkable. Repeat CXR and abdominal film without obvious source. Vancomycin random level therapeutic. CT head completed 4/27 without any acute changes. Repeat Lactate < 2. Hg stable. No leukocytosis present. Troponin elevated but plateaued in setting of ESRD. PEG tube looks healthy-no signs of infection. Stage 2 sacral wound present but unchanged--does not appear necrotic and no discharge to indicate this as source.     S/p declot 4/28 with vascular surgery. Have confirmed that dialysis will be scheduled for today and he can receive one UPRBC. Have stopped heparin gtt see conversation with family documented below for details--Hg drop in setting of recurrent GI bleeds. Discussed with family-decision to hold heparin at this time as they are more concerned about bleeding risk.     Previous Concern for G+ cocci in blood sample- but more likely contaminant as one bottle with cogulase negative staph. Continue on zosyn for possible PNA but CXR improved. Hg low on admission--GI has evaluated, Hg responded to 1 UPRBC and no plan for elective procedure at this time. D-dimer at 6 now 4-- concern for GI bleeding risk. Concern for another infectious process than covid at this time.       Review of Systems:  Unable to obtain 2/2 aphasia      Past Medical History: Patient has a past medical history of Amputation stump pain (9/10/2013), Aspiration pneumonia (7/27/2015), Asterixis (11/8/2016), C. difficile colitis (8/7/2015), Cholelithiasis without obstruction (8/25/2015), Chronic diastolic heart failure, Chronic low back pain (12/1/2015), Closed head injury (9/8/2016), DVT (deep venous thrombosis) (7/28/2017), ESRD on hemodialysis (2/7/2013), GERD (gastroesophageal reflux disease), HCV antibody positive, Hemiparesis affecting  left side as late effect of stroke (11/08/2016), History of Intracerebral Hemorrhage: L BG 5/2013; R BG 9/2016; R BG 11/2016; L caudate head 2/2017 (11/2/2016), Hypertension, left basal ganglia ICH 5/2013 (11/2/2016), Left Caudate Head ICH 2/22/2017 (2/24/2017), 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      Past Surgical History: Patient has a past surgical history that includes R AVF (9/12/12); Leg amputation through knee (12/18/2013); Foot amputation through metatarsal; Colonoscopy; Colonoscopy (N/A, 4/4/2017); Esophagogastroduodenoscopy (N/A, 6/12/2018); Upper gastrointestinal endoscopy; Esophagogastroduodenoscopy (N/A, 3/7/2019); Esophagogastroduodenoscopy (N/A, 9/23/2019); Esophagogastroduodenoscopy (N/A, 10/2/2019); Esophagogastroduodenoscopy (N/A, 11/12/2019); Esophagogastroduodenoscopy (N/A, 2/14/2020); Fistulogram (Right, 4/28/2020); and Declotting of vascular graft (N/A, 4/28/2020).      Social History: Patient reports that he has quit smoking. He has a 10.00 pack-year smoking history. He has never used smokeless tobacco. He reports that he does not drink alcohol or use drugs.      Family History: Patient's family history includes Alcohol abuse in his maternal grandmother; Diabetes in his brother and maternal grandfather; Early death in his mother; Heart disease in his father; Hyperlipidemia in his father; Hypertension in his father and sister; Kidney disease in his father.      Medications: Scheduled Meds:   albuterol  2 puff Inhalation Q6H    bisacodyL  10 mg Rectal Once    epoetin la nena-ebpx (RETACRIT) injection  50 Units/kg Intravenous Every Tues, Thurs,  Sat    levetiracetam oral soln  250 mg Per G Tube BID    metoclopramide HCl  5 mg Per G Tube QID    pantoprozole (PROTONIX) IV  40 mg Intravenous Q12H    senna-docusate 8.6-50 mg  1 tablet Per G Tube BID     Continuous Infusions:   heparin (porcine) in D5W 18 Units/kg/hr (05/02/20 0228)     PRN Meds:.sodium chloride, sodium chloride 0.9%, sodium chloride 0.9%, acetaminophen, dextromethorphan-guaifenesin  mg/5 ml, Dextrose 10% Bolus, Dextrose 10% Bolus, glucose, glucose, heparin (PORCINE), heparin (PORCINE), insulin aspart U-100, melatonin, midodrine, ondansetron, sodium chloride 0.9%      Allergies: Patient is allergic to fosrenol [lanthanum].      Physical Exam:    Temp:  [97.8 °F (36.6 °C)-100.8 °F (38.2 °C)]   Pulse:  [105-129]   Resp:  [18-25]   BP: (112-154)/(70-86)   SpO2:  [96 %-100 %]     Constitutional: Appears sickly chronically. Basic yes/no answers at times, sometimes does not respond. tday more responsive.  Head: Normocephalic and atraumatic.   Eyes: EOM are normal. Pupils are equal, round, and reactive to light. No scleral icterus.   Neck: Normal range of motion. Neck supple.   Cardiovascular: Normal heart rate.  Regular heart rhythm.  No murmur heard.  Pulmonary/Chest: Comfortable on 2L NC.  Coarse breath sounds throughout  Abdominal: Soft. Bowel sounds are normal.  No distension.  No tenderness  Musculoskeletal: Left BKA.  Right leg contractures  Neurological: Alert and making eye contact at times.  Intermittent answering questions  Skin: Skin is warm and dry.   Psychiatric: Normal mood and affect. Behavior is normal.       Laboratory:  Recent Labs   Lab 05/01/20  0202 05/02/20  0337 05/03/20  0341   WBC 10.30 11.04 11.03   LYMPH 11.7*  1.2 10.6*  1.2 10.8*  1.2   HGB 7.3* 7.4* 7.3*   HCT 23.9* 24.0* 24.9*    323 374*     Recent Labs   Lab 04/30/20  0400 05/01/20  0202 05/02/20  0337 05/03/20  0341 05/03/20  0723    139 135* 140  --    K 3.3* 3.5 3.5 4.6  --    CL 99 103 99  103  --    CO2 26 25 25 24  --    BUN 60* 37* 62* 29*  --    CREATININE 3.5* 3.2* 4.5* 2.9*  --    * 296* 301* 251*  --    CALCIUM 8.9 9.5 9.8 9.2  --    MG 2.0 1.9  --   --  1.9   PHOS 1.7* 1.6* 3.6  --  2.8     Recent Labs   Lab 04/29/20  1139  05/01/20  0202 05/02/20  0337 05/03/20  0341   ALKPHOS  --    < > 98 125 127   ALT  --    < > 56* 47* 37   AST  --    < > 76* 57* 52*   ALBUMIN  --    < > 1.4* 1.3* 1.3*   PROT  --    < > 7.7 7.7 7.9   BILITOT  --    < > 0.3 0.3 0.3   INR 1.2  --   --   --   --     < > = values in this interval not displayed.      Recent Labs     05/01/20 0202 05/03/20  0723   .1* 175.4*   LACTATE 1.5  --        All labs within the last 24 hours were reviewed.     Microbiology:  Lab Results   Component Value Date    [id number] Positive (A) 04/22/2020       Microbiology Results (last 7 days)     Procedure Component Value Units Date/Time    Blood culture [[id number]] Collected:  05/01/20 0202    Order Status:  Completed Specimen:  Blood Updated:  05/03/20 0612     Blood Culture, Routine No Growth to date      No Growth to date      No Growth to date    Narrative:       Collection has been rescheduled by RF at 05/01/2020 00:10 Reason:   Unable to collect specimen x 3  Collection has been rescheduled by RF at 05/01/2020 00:31 Reason: ty   was notified to try this patient  Collection has been rescheduled by RF at 05/01/2020 00:10 Reason:   Unable to collect specimen x 3  Collection has been rescheduled by RF at 05/01/2020 00:31 Reason: ty   was notified to try this patient    Blood culture [[id number]] Collected:  05/01/20 0201    Order Status:  Completed Specimen:  Blood Updated:  05/03/20 0612     Blood Culture, Routine No Growth to date      No Growth to date      No Growth to date    Narrative:       Collection has been rescheduled by RF at 05/01/2020 00:10 Reason:   Unable to collect specimen x 3  Collection has been rescheduled by RF at 05/01/2020 00:31 Reason: ty   was  notified to try this patient  Collection has been rescheduled by RF at 05/01/2020 00:10 Reason:   Unable to collect specimen x 3  Collection has been rescheduled by RF at 05/01/2020 00:31 Reason: ty   was notified to try this patient    Blood culture [[id number]] Collected:  04/27/20 1039    Order Status:  Completed Specimen:  Blood Updated:  05/01/20 1222     Blood Culture, Routine No Growth after 4 days.     Narrative:       Collection has been rescheduled by 3 at 04/27/2020 09:57 Reason:   Unable to collect  Collection has been rescheduled by 3 at 04/27/2020 10:03 Reason:   Unable to collect  Collection has been rescheduled by 3 at 04/27/2020 10:04 Reason:   spoke with ISMAEL Roland  Collection has been rescheduled by [facility] at 04/27/2020 09:57 Reason:   Unable to collect  Collection has been rescheduled by [facility] at 04/27/2020 10:03 Reason:   Unable to collect  Collection has been rescheduled by [facility] at 04/27/2020 10:04 Reason:   spoke with ISMAEL Roland    Blood culture [[id number]] Collected:  04/25/20 0804    Order Status:  Completed Specimen:  Blood Updated:  04/29/20 1012     Blood Culture, Routine No Growth after 4 days.     Narrative:       ADD-ON CRP #[id number] Per. mitch Calzada MD  04/25/2020  08:33     Blood culture x two cultures. Draw prior to antibiotics. [[id number]] Collected:  04/22/20 1849    Order Status:  Completed Specimen:  Blood from Peripheral, Antecubital, Left Updated:  04/26/20 2212     Blood Culture, Routine No Growth after 4 days.     Narrative:       Aerobic and anaerobic            Imaging  ECG Results          EKG 12-lead (Edited Result - FINAL)  Result time 04/23/20 12:54:59    Edited Result - FINAL by Interface, Lab In [facility] (04/23/20 12:54:59)                 Narrative:    Test Reason : R06.00,    Vent. Rate : 113 BPM     Atrial Rate : 113 BPM     P-R Int : 132 ms          QRS Dur : 094 ms      QT Int : 342 ms       P-R-T Axes : 082 067 083 degrees     QTc Int : 469 ms    Age and  "gender specific analysis  Sinus tachycardia  Incomplete right bundle branch block  Nonspecific T wave abnormality  Cannot exclude Lateral infarct  Abnormal ECG  When compared with ECG of 28-MAR-2020 13:40,  Left anterior fascicular block is no longer Present  lateral infarct is now present  Reconfirmed by KYLER AVERY MD ([id number]) on 4/23/2020 12:54:46 PM    Referred By: KHARI   SELF           Confirmed By:KYLER AVERY MD                              No results found for this or any previous visit.    X-Ray Chest 1 View  Narrative: EXAMINATION:  XR CHEST 1 VIEW    CLINICAL HISTORY:  Provided history is "sepsis, mildly tachypneic, COVID+;  ".    TECHNIQUE:  One view of the chest.    COMPARISON:  Chest radiograph, 04/27/2020 and 04/22/2020.  CTA chest, 04/24/2020.    FINDINGS:  Cardiac wires overlie the chest.  Cardiomediastinal silhouette is not enlarged.  There is no focal consolidation.  No sizable pleural effusion.  No pneumothorax.  No detrimental change in lung aeration when compared with the prior study.  Impression: No focal consolidation or detrimental change when compared with 04/27/2020.    Electronically signed by: Byron Maynard MD  Date:    05/01/2020  Time:    00:38      All imaging within the last 24 hours was reviewed.       Assessment and Plan:    Active Hospital Problems    Diagnosis  POA    *COVID-19 virus infection [U07.1]  Yes    Hyperglycemia [R73.9]  Yes    Leukocytosis [D72.829]  Yes    Pulmonary embolism [I26.99]  Yes    Palliative care encounter [Z51.5]  Not Applicable    Advanced care planning/counseling discussion [Z71.89]  Not Applicable    Goals of care, counseling/discussion [Z71.89]  Not Applicable    ESRD on dialysis [N18.6, Z99.2]  Not Applicable    Pressure injury due to medical device [T85.898A, L89.90]  Yes    Seizure [R56.9]  Yes    Chronic blood loss anemia [D50.0]  Yes    Hemodialysis-associated hypotension [I95.3]  Yes    Severe malnutrition [E43]  Yes     " Assessment and Plan  Severe Malnutrition in the context of Social/Environmental Circumstances     Related to (etiology):  Unknown etiology     Signs and Symptoms (as evidenced by):  Energy Intake: per pt, <75% intake over the last year  Body Fat Depletion: overall subcutaneous fat loss, moderate triceps fat loss and protruding patellas.  Muscle Mass Depletion:  moderate muscle wasting of interosseous, temporal, clavicle, calves and quadriceps  Weight Loss: 24% (39 lbs) x 1 year    Recommendations     Recommendation/Intervention: 1. Should pt be cleared for po diet, recommend renal diet with texture per SLP along with Novasource ONS TID. 2. Should pt require enteral feeding, initiate Novasource renal formula at 10ml/hr and advance 10ml Q4hrs to goal rate of 40ml/hr (provides 1920kcal, 87g pro, 691ml free water). Provide additional 500ml water flush/24 hrs. Hold for residuals >500ml.  Goals: 1. Pt to receive nutrition < 48 hrs.      Erosive gastritis [K29.60]  Yes    Chronic upper GI bleeding [K92.2]  Yes    Chronic kidney disease-mineral and bone disorder [N18.9, E83.9, M89.9]  Yes     Chronic    Chronic diastolic heart failure [I50.32]  Yes     Chronic     2-23-17    1 - Low normal to mildly depressed left ventricular systolic function (EF 50-55%).     2 - Right ventricular enlargement with mildly depressed systolic function.     3 - Left ventricular diastolic dysfunction.     4 - Right atrial enlargement.     5 - Severe tricuspid regurgitation.     6 - Pulmonary hypertension. The estimated PA systolic pressure is 86 mmHg.     7 - Increased central venous pressure.       History of Intracerebral Hemorrhage: L BG 5/2013; R BG 9/2016; R BG 11/2016; L caudate head 2/2017 [Z86.79]  Not Applicable     Chronic    Acute respiratory failure with hypoxia [J96.01]  Yes    Stenosis of arteriovenous dialysis fistula [T82.858A]  No    Anemia in chronic kidney disease [N18.9, D63.1]  Yes     Chronic      Resolved Hospital  Problems   No resolved problems to display.       Covid-19 Virus Infection  - COVID-19 testing: Positive on 3/25 and again on 4/22  - Isolation: Airborne/Droplet. Surgical mask on patient. Notify Infection Control  - Diagnostics: Trend Q48hrs if stable, more frequently if patient decompensating.  Lymphopenia, hyponatremia, hyperferritinemia, elevated troponin, elevated d-dimer, age, and comorbidities are significant predictors of poor clinical outcome)  o CBC:  WBC 15  o CMP:  ESRD on HD  o Ferritin:    o D-dimer:    o CRP:  270  o BNP:    o CPK:  957  o LDH:  226  o Troponin:  0.439  o Procalcitonin:  5.39  o CXR:  Improving consolidations  o Blood culture x2 4/22/2020 NGTD  o Sputum culture 4/22/2020 NGTD    - Management: Per [facility] COVID Treatment Protocol (4/15/20)    - Monitoring:   - Telemetry & Continuous Pulse Oximetry    - Nutrition:    - Multivitamin PO daily   - Add Boost supplement   - Vitamin D 1000IU daily if deficient   - Ascorbic acid 500mg PO bid    - Supportive Care:   - acetaminophen 650mg PO Q6hr PRN fever/headache   - loperamide PRN viral diarrhea   - IVF if indicated, restrictive strategy preferred, no maintenance IV if able   - VTE PPx: enoxaparin or heparin SQ unless contraindicated    - Antibiotics:  - if indications, CXR findings, elevated procal. See protocol for alternatives.   - Discontinue early if low concern for bacterial co-infection   - Vanc/Zosyn given COVID and recent hospitalization    Acute Hypoxemic Respiratory Failure  Acute Respiratory Disease 2/2 COVID19  - Order RT consult via Respiratory Communication for COVID Protocols  - Order Incentive Spirometer Q4h  - Order Flutter Valve Q4h  - Continuous Pulse Oximetry  - Goal SpO2 92-96%  - Supplemental O2 via LFNC, VentiMask, or HFNC (see Respiratory Support Oxygen Therapies)  - If wheezing, albuterol INH Q6h scheduled & PRN  - Proning Protocol if patient is a candidate (see  Proning Protocol)   - GCS >13, able to self-prone  -  If deterioration, may warrant trial of NIPPV and transfer to neg pressure room or immediate ICU consult  - CXR with multifocal opacities  - Satting 100% on 2L NC  - Continue Vanc/Zosyn given recent hospitalization currently, discuss with ID as difficulty finding source. Maybe blood clots?   - ID recs: complete 7 days zosyn (End date 4/29), continue to monitor clinically.   4/30: off anitbiotics now.    Chronic Upper GIB  Erosive Gastritis  · Reports of recurrent and chronic GIB  · Last EGD 2/14/20 with non-bleeding erosive gastropathy  · Continue PPI BID  · GI consult given severe drop in anemia, no plans for EGD currently  · 4/27: Hg 8 stable    · 4/28: Slight trend downward in Hg 7.4 with tachycardia, transfuse 1UPRBC with HD.   · 4/30: downtrend again today to 7, may need transfusion tomorrow. No clear signs of leeding but known hx of upper bleeds, 5 EGDs in last year, last in feb, GI aware of patient thi sadmit  · 5/1: stable at 7.3    Chronic Blood Loss Anemia  Anemia in CKD  · Hemoglobin 7.2 on admit with tachycardia  · Was 9 2 weeks ago at DC  · Type and screen ordered  · 4/28: Transfused 1 unit PRBCs  · 4/29: Repeat Hg this AM of 8 today  · 4/30: down to 7 again. Trend. .may need tx tomorrow if stays on this trend, no signs of activ ebleeding, BP is stable.  · 5/1: 7.3, stable    History of ICH  · 2013  · Resides at [facility]  · 4/28: Repeat CT scan today, no overt neurological changes but patient appears more lethargic.     Chronic Diastolic Heart Failure  · Chronic and stable  · Not on BP medications    HD Associated Hypotension  · Chronic issue  · Continue Midodrine with HD MWF    CKD Mineral and Bone Disorder  ESRD on HD  · Chronic and stable  · 5/1: stop renvela as phos is in the 1's, replace phos today.    Seizure Disorder  · Chronic and stable  · Continue Keppra 250mg BID  · Seizure precautions    Sacral Pressure Injury  · Wound Care consult    Lower Extremity DVT  - on heparin gtt currently  -  due to high bleeding risk hesistant to commit patient to long term oral anticoagulation especially as this may be a chronic thrombus, heparin restarted 4/29, but hg lower again today 4/30, may have to stop again if risk outweighs benefit  5/1: hg stable so continuing  52: need to decide long term planning, oral meds vs lovenox full dose vs do we anticoag     Rt lower lung Pulmonary embolism  - treatment heparing gtt.   - extensive discussion with family concerning risk/benefit of anticoagulation in history of recurrent UGI bleeding. We discussed issues that patient drop in Hg again 4/28. No hematemesis and no bowel movement today but remains concerning. They agree that they would like to hold the anticoagulation at this time until assess for GI bleeding.  Will monitor overnight but he may need to be restarted on heparin if Hg stable after transfusion.   -4/29: monitored overnight, appropriate increase in HG with transfusion, no reports of melena or hematemesis, EKG with sustained NSR, will restart heparin gtt at low intensity.   4/30: see LE DVT, may have to consider stopping again given hg downtrend again if continuse tomorrow  5/1: hg stable so continuing  5/2: long term planning, will dicsuss with sister    Fever  -unclear etiology still, has had large workup in recent days, cultured many times without results besides 1 coag neg staph, treated but wasn't thought to be true infection, ID saw patient and thought it was clots as source, CRP downtrend  -continue to trend, work up again 5/1 without other causes seen as yet, clots seems likely fever source from work up as yet    Patient's chronic/stable medical conditions noted in the assessment above will be managed with the patient's home medications as tolerated.      Diet:  Renal pleasure feeds, TF  VTE PPx:  Heparin BID  Goals of Care:  Full code based on paperwork from NH.  Was DNR on previous admission.    4/25: sister updated on phone at 2:40pm    4/27: updated  sister on phone. We discussed the significant complexity of medical issues and that my recommendation would be to begin a transition to hospice capacity. She will discuss further with her brother but no decisions made yet. We have discussed continuing heparin gtt in hospital for today but long term my recommendation is to avoid anticoagulation as he has long history of life threatening recurent GI bleeding.  I was very straight forward that he may not recover from this hospitalization due to significant comorbidities, infection, pulmonary embolism, heart failure, ESRD, bed bound, dementia with severe neurological issues related to prior ICH/stroke and malnutrition with sacral stage 2 wound.     4/28: updated sister at 4/28. We discussed his declot and plans for dialysis. I informed her that he needs 1U PRBC today as his Hg dropped. She agrees with holding the heparin gtt tonight to ensure no evidence of bleeding before restarting potentially in AM.  (recurrent hospital admissions for hematemesis). Again emphasized the appropriateness of transition to palliative. Patient with very poor quality of life.     5/1: updated sister, ismael stokes has been discussing overall long term goals daily  5/2: patient anxious for dc, will discuss in what capacity with sister more tmorrow, need anticoag decision is biggest thing now to decide for dc and the spectrum of care we will focus on for him at [facility]  5/3: patient again just says I want to go home. musa call sister to discuss plans for this

## 2020-05-03 NOTE — PROGRESS NOTES
Pt has been repositioned for comfort and pressure management, Pillows used for support foam dressing to the sacral area replaced as pt had a large BM and soiled the area. Does not make urine, legs are uncomfortable to perform ROM. Gentle touch used.HOB kept at 45 degrees due to tube feeding, heparin gtt continues aPTT drawn at 11am was therapeutic no adjustment needed.

## 2020-05-03 NOTE — PLAN OF CARE
Problem: Wound  Goal: Optimal Wound Healing  Outcome: Ongoing, Progressing     Problem: Fall Injury Risk  Goal: Absence of Fall and Fall-Related Injury  Outcome: Ongoing, Progressing     Problem: Adult Inpatient Plan of Care  Goal: Plan of Care Review  Outcome: Ongoing, Progressing  Goal: Patient-Specific Goal (Individualization)  Outcome: Ongoing, Progressing  Goal: Absence of Hospital-Acquired Illness or Injury  Outcome: Ongoing, Progressing  Goal: Optimal Comfort and Wellbeing  Outcome: Ongoing, Progressing  Goal: Readiness for Transition of Care  Outcome: Ongoing, Progressing  Goal: Rounds/Family Conference  Outcome: Ongoing, Progressing     Problem: Diabetes Comorbidity  Goal: Blood Glucose Level Within Desired Range  Outcome: Ongoing, Progressing     Problem: Skin Injury Risk Increased  Goal: Skin Health and Integrity  Outcome: Ongoing, Progressing     Problem: Device-Related Complication Risk (Hemodialysis)  Goal: Safe, Effective Therapy Delivery  Outcome: Ongoing, Progressing     Problem: Hemodynamic Instability (Hemodialysis)  Goal: Vital Signs Remain in Desired Range  Outcome: Ongoing, Progressing     Problem: Infection (Hemodialysis)  Goal: Absence of Infection Signs/Symptoms  Outcome: Ongoing, Progressing     Problem: Malnutrition  Goal: Improved Nutritional Intake  Outcome: Ongoing, Progressing     Problem: Coping Ineffective  Goal: Effective Coping  Outcome: Ongoing, Progressing     Problem: Electrolyte Imbalance (Chronic Kidney Disease)  Goal: Electrolyte Balance  Outcome: Ongoing, Progressing       Plan of care reviewed with patient. He is AAOx1 to self . He also nods at times to simple verbal questions .  His vs remains stable. He remains on heparin gtt at 18 uts/ kg/hr and is awaiting aptt lab results this morning. His enteral feeding is infusing at 35 ml/ hr . Residual was 12 ml . He is repositioned every 2 hours with pillows and wedge. Safety maintained. Will continue to monitor.

## 2020-05-04 NOTE — PLAN OF CARE
05/04/20 0904   Post-Acute Status   Post-Acute Authorization Placement   Post-Acute Placement Status Referrals Sent     Sw sent referral to [facility], awaiting updated Covid lab and will send to other SNF and LTAC facilities. Rosalie with [facility] may be able to accept Pt back, Carri sent all clinical information to facility, will follow. Carri sent hard script and left message with admissions to see if Pt is ok to return.

## 2020-05-04 NOTE — NURSING
Hi. pts last covid test was 4.22.20 and was positive. Do we want to order another test before we try and send him back to [facility]? Thanks

## 2020-05-04 NOTE — PLAN OF CARE
Sw informed Pt ok to d/c back to [facility], nurse to call report to  to [facility] and room is 7a. Stretcher setup for 4pm and nurse and family aware.

## 2020-05-04 NOTE — NURSING
Patient is ready for discharge. Patient stable and alert. IVs removed. Tele removed. No complaints of pain. Discussed discharge plan. Reviewed medications and side effects, appointments, and answered questions with patient. Pt leaving via transport in stretcher @ 4pm. Gave report to charge nurse at [place]. Prescription left with pt.

## 2020-05-04 NOTE — DISCHARGE SUMMARY
DISCHARGE SUMMARY  Hospital Medicine    Team: [facility] [place] [facility]    Patient Name: Vaughn Retana  YOB: 1965    Admit Date: 4/22/2020    Discharge Date: 05/04/2020    Discharge Attending Physician: Charmaine Pedro MD     Principal Diagnoses:  Active Hospital Problems    Diagnosis  POA    *COVID-19 virus infection [U07.1]  Yes    Hyperglycemia [R73.9]  Yes    Leukocytosis [D72.829]  Yes    Pulmonary embolism [I26.99]  Yes    Palliative care encounter [Z51.5]  Not Applicable    Advanced care planning/counseling discussion [Z71.89]  Not Applicable    Goals of care, counseling/discussion [Z71.89]  Not Applicable    ESRD on dialysis [N18.6, Z99.2]  Not Applicable    Pressure injury due to medical device [T85.898A, L89.90]  Yes    Seizure [R56.9]  Yes    Chronic blood loss anemia [D50.0]  Yes    Hemodialysis-associated hypotension [I95.3]  Yes    Severe malnutrition [E43]  Yes     Assessment and Plan  Severe Malnutrition in the context of Social/Environmental Circumstances     Related to (etiology):  Unknown etiology     Signs and Symptoms (as evidenced by):  Energy Intake: per pt, <75% intake over the last year  Body Fat Depletion: overall subcutaneous fat loss, moderate triceps fat loss and protruding patellas.  Muscle Mass Depletion:  moderate muscle wasting of interosseous, temporal, clavicle, calves and quadriceps  Weight Loss: 24% (39 lbs) x 1 year    Recommendations     Recommendation/Intervention: 1. Should pt be cleared for po diet, recommend renal diet with texture per SLP along with Novasource ONS TID. 2. Should pt require enteral feeding, initiate Novasource renal formula at 10ml/hr and advance 10ml Q4hrs to goal rate of 40ml/hr (provides 1920kcal, 87g pro, 691ml free water). Provide additional 500ml water flush/24 hrs. Hold for residuals >500ml.  Goals: 1. Pt to receive nutrition < 48 hrs.      Erosive gastritis [K29.60]  Yes    Chronic upper GI bleeding [K92.2]  Yes     Chronic kidney disease-mineral and bone disorder [N18.9, E83.9, M89.9]  Yes     Chronic    Chronic diastolic heart failure [I50.32]  Yes     Chronic     2-23-17    1 - Low normal to mildly depressed left ventricular systolic function (EF 50-55%).     2 - Right ventricular enlargement with mildly depressed systolic function.     3 - Left ventricular diastolic dysfunction.     4 - Right atrial enlargement.     5 - Severe tricuspid regurgitation.     6 - Pulmonary hypertension. The estimated PA systolic pressure is 86 mmHg.     7 - Increased central venous pressure.       History of Intracerebral Hemorrhage: L BG 5/2013; R BG 9/2016; R BG 11/2016; L caudate head 2/2017 [Z86.79]  Not Applicable     Chronic    Acute respiratory failure with hypoxia [J96.01]  Yes    Stenosis of arteriovenous dialysis fistula [T82.858A]  No    Anemia in chronic kidney disease [N18.9, D63.1]  Yes     Chronic      Resolved Hospital Problems   No resolved problems to display.       Discharged Condition: fair           HOSPITAL COURSE:      Initial Presentation:    Mr. Vaughn Retana is a 54 y.o. male with hypertension, intracranial bleed, end-stage renal on dialysis MWF,L BKA 2/2 osteomyelitis, history of DVT, history of heart failure including pulmonary hypertension with normal EF (last 2017), hx upper GI bleeds, PEG status, seizures, latent TB presented from [facility] for evaluation of shortness of breath.  Patient isn't able to speak, so history is obtained via yes/no questions and ER documentation.  He denies any SOB, cough, fever, or chills and says he feels ok with no pain.  Of note, he just had an extensive hospitalization at [facility] from 3/28-4/15 for COVID.  At the end of his hospital course, he was transfused blood and decided to maintain a conservative approach so GI wasn't involved.  Additionally, he was DNR during the last hospital stay.     Upon arrival to the ER, vitals were temp 98.9F with HR 110s and satting  100% on 2L NC.  CXR showed improved consolidations from his recent hospitalization.  He was given Vanc and Zosyn and was admitted to Hospital Medicine for further management.    Course of Principle Problem for Admission:    Fever  -admitted for work up, no septic etiology found, PE found, treated with heparin drip initially but had HG downtrend and concerns for upper gi bleed which hes had many times before, stopped iniitally, then resumed once Hg stable but was in mid 7s throughout and veyr high risk. Decided not to treat PE, see below due to extreme risk of this  -ID consulted tos ee patient for fevers of 100-101 on antibiotics, completed a zosyn/vanac course for any other resp etiology but CXR was imprved from previous, no source on many blood Cx besides coag neg staph likely contaminant per ID but was treatd with above regimen while here  -CRP was up but has multiple etiology, chronically ill, recent covid, downtrended, wbc improved and was off antibiotics through 3 days prior to dc without decompensation  -clots considered source at this time as none other found and per ID note on consult    Other Medical Problems Addressed in the Hospital:      COVID-19 infection  -recent 1 month stay for this, on room air here, no signs of covid issues contirbuting acutely besides worsening coaguable state likely a factor recently in his new PE on chronic DVTs      Chronic Upper GIB  Erosive Gastritis  · Reports of recurrent and chronic GIB  · Last EGD 2/14/20 with non-bleeding erosive gastropathy  · Continue PPI BID  · GI consult given severe drop in anemia, no plans for EGD currently  · 4/27: Hg 8 stable    · 4/28: Slight trend downward in Hg 7.4 with tachycardia, transfuse 1UPRBC with HD.   · 4/30: downtrend again today to 7, may need transfusion tomorrow. No clear signs of leeding but known hx of upper bleeds, 5 EGDs in last year, last in feb, GI aware of patient nicole saavedra  · 5/1: stable at 7.3  · 5/4:7.7 stable on  dc  · Due to severe issues with this in the past and concern even here for this, decision made to not anticoagulate PE, chronic DVT due to life threatning bleed more severe risk than these (as on room air, etc) after discussing with his sister at length on phone     Chronic Blood Loss Anemia  Anemia in CKD  · See above, baseline high 7-s to 9, 7.7 on dc.     History of ICH  · 2013  · Resides at [facility]  · 4/28: Repeat CT scan today, no overt neurological changes but patient appears more lethargic.      Chronic Diastolic Heart Failure  · Chronic and stable  · Not on BP medications     HD Associated Hypotension  · Chronic issue  · Continue Midodrine with HD MWF     CKD Mineral and Bone Disorder  ESRD on HD  · Chronic and stable  · 5/1: stop renvela as phos is in the 1's, on dc     Seizure Disorder  · Chronic and stable  · Continue Keppra 250mg BID on dc     Sacral Pressure Injury  · Wound Care consult, continue recs on dc to NH     Lower Extremity DVT  - on heparin gtt intermittently here but had stopping for low Hg during stay t one point.  - due to high bleeding risk hesistant to commit patient to long term oral anticoagulation especially as this may be a chronic thrombus, heparin restarted 4/29, but hg lower again today 4/30, may have to stop again if risk outweighs benefit  5/1: hg stable so continuing  52: need to decide long term planning, oral meds vs lovenox full dose vs do we anticoag   5/4: discussed with sister, given his sever life threatnign bleeds, 5 in last year at least, have decided the bleeding risk is higher than theclot risk so will hold anticoagulation on dc     Rt lower lung Pulmonary embolism  - treatment heparing gtt.   - extensive discussion with family concerning risk/benefit of anticoagulation in history of recurrent UGI bleeding. We discussed issues that patient drop in Hg again 4/28. No hematemesis and no bowel movement today but remains concerning. They agree that they would like to  hold the anticoagulation at this time until assess for GI bleeding.  Will monitor overnight but he may need to be restarted on heparin if Hg stable after transfusion.   -4/29: monitored overnight, appropriate increase in HG with transfusion, no reports of melena or hematemesis, EKG with sustained NSR, will restart heparin gtt at low intensity.   4/30: see LE DVT, may have to consider stopping again given hg downtrend again if continuse tomorrow  5/1: hg stable so continuing  5/2: long term planning, will dicsuss with sister    Failure to thrive  -long term would recommend trnasition to focus on comfort, palilative discussed this, sister not ready yet, but reiterated that this will be something to focus on given quality of life long term for him        Consults: palliative, gi, id renal    Last CBC/BMP:    CBC/Anemia Labs: Coags:    Recent Labs   Lab 05/02/20 0337 05/03/20 0341 05/04/20  0513   WBC 11.04 11.03 11.32   HGB 7.4* 7.3* 7.7*   HCT 24.0* 24.9* 25.9*    374* 414*   MCV 92 96 96   RDW 16.1* 16.7* 16.9*    Recent Labs   Lab 04/29/20  1139  05/03/20  0341 05/03/20  0610 05/03/20  0723   INR 1.2  --   --   --   --    APTT <21.0   < > 38.4*  38.4* 22.5 35.0*    < > = values in this interval not displayed.        Chemistries:   Recent Labs   Lab 04/30/20  0400 05/01/20  0202 05/02/20  0337 05/03/20  0341 05/03/20  0723 05/04/20  0513    139 135* 140  --  140   K 3.3* 3.5 3.5 4.6  --  4.5   CL 99 103 99 103  --  103   CO2 26 25 25 24  --  23   BUN 60* 37* 62* 29*  --  56*   CREATININE 3.5* 3.2* 4.5* 2.9*  --  4.2*   CALCIUM 8.9 9.5 9.8 9.2  --  9.9   PROT 7.6 7.7 7.7 7.9  --  7.9   BILITOT 0.4 0.3 0.3 0.3  --  0.3   ALKPHOS 104 98 125 127  --  118   ALT 69* 56* 47* 37  --  27   * 76* 57* 52*  --  33   MG 2.0 1.9  --   --  1.9  --    PHOS 1.7* 1.6* 3.6  --  2.8  --               Special Treatments/Procedures:   Procedure(s) (LRB):  Fistulogram (Right)  PTA, Fistula  Declot, Vascular Graft  (N/A)     Disposition: Nursing Facility      Future Scheduled Appointments:  No future appointments.        Discharge Medication List:       Vaughn Retana   Home Medication Instructions ANABEL:[id number]    Printed on:05/04/20 5812   Medication Information                      acetaminophen (TYLENOL) 325 MG tablet  Take 2 tablets (650 mg total) by mouth every 8 (eight) hours as needed.             albuterol (PROVENTIL/VENTOLIN HFA) 90 mcg/actuation inhaler  Inhale 2 puffs into the lungs every 6 (six) hours. Rescue             bisacodyL (DULCOLAX) 10 mg Supp  Place 1 suppository (10 mg total) rectally daily as needed (if no BM x  2 days).             dextromethorphan-guaifenesin  mg/5 ml (ROBITUSSIN-DM)  mg/5 mL liquid  10 mLs by Per G Tube route every 4 (four) hours as needed.             HYDROcodone-acetaminophen (NORCO) 5-325 mg per tablet  1 tablet by Per G Tube route every 6 (six) hours as needed.             insulin aspart U-100 (NOVOLOG) 100 unit/mL (3 mL) InPn pen  Inject 0-5 Units into the skin before meals and at bedtime as needed (Hyperglycemia).             levetiracetam oral soln 500 mg/5 mL (5 mL) Soln  2.5 mLs (250 mg total) by Per G Tube route 2 (two) times daily.             metoclopramide HCl (REGLAN) 5 MG tablet  1 tablet (5 mg total) by Per G Tube route 4 (four) times daily.             midodrine (PROAMATINE) 5 MG Tab  1 tablet (5 mg total) by Per G Tube route as needed (hypotension with dialysis). Per dialysis doctor protocols             ondansetron (ZOFRAN) 8 MG tablet  Take 1 tablet (8 mg total) by mouth every 12 (twelve) hours as needed for Nausea.             pantoprazole (PROTONIX) 40 mg GrPS  1 packet (40 mg total) by Per G Tube route 2 (two) times daily.             senna-docusate 8.6-50 mg (PERICOLACE) 8.6-50 mg per tablet  1 tablet by Per G Tube route 2 (two) times daily.                 Patient Instructions:  Discharge Procedure Orders   Activity as tolerated       At  the time of discharge patient was told to take all medications as prescribed, to keep all followup appointments, and to call their primary care physician or return to the emergency room if they have any worsening or concerning symptoms.    Signing Physician:  Charmaine Pedro MD

## 2020-05-04 NOTE — CARE UPDATE
I held MDI for pt and he was able to inhale adequately enough to benefit from the MDI through a spacer.   2 puffs given.

## 2020-05-04 NOTE — PLAN OF CARE
Problem: Wound  Goal: Optimal Wound Healing  Outcome: Ongoing, Progressing     Problem: Fall Injury Risk  Goal: Absence of Fall and Fall-Related Injury  Outcome: Ongoing, Progressing     Problem: Adult Inpatient Plan of Care  Goal: Plan of Care Review  Outcome: Ongoing, Progressing  Goal: Patient-Specific Goal (Individualization)  Outcome: Ongoing, Progressing  Goal: Absence of Hospital-Acquired Illness or Injury  Outcome: Ongoing, Progressing  Goal: Optimal Comfort and Wellbeing  Outcome: Ongoing, Progressing  Goal: Readiness for Transition of Care  Outcome: Ongoing, Progressing  Goal: Rounds/Family Conference  Outcome: Ongoing, Progressing     Problem: Diabetes Comorbidity  Goal: Blood Glucose Level Within Desired Range  Outcome: Ongoing, Progressing     Problem: Skin Injury Risk Increased  Goal: Skin Health and Integrity  Outcome: Ongoing, Progressing     Problem: Device-Related Complication Risk (Hemodialysis)  Goal: Safe, Effective Therapy Delivery  Outcome: Ongoing, Progressing     Problem: Hemodynamic Instability (Hemodialysis)  Goal: Vital Signs Remain in Desired Range  Outcome: Ongoing, Progressing     Problem: Infection (Hemodialysis)  Goal: Absence of Infection Signs/Symptoms  Outcome: Ongoing, Progressing     Problem: Malnutrition  Goal: Improved Nutritional Intake  Outcome: Ongoing, Progressing     Problem: Coping Ineffective  Goal: Effective Coping  Outcome: Ongoing, Progressing     Problem: Electrolyte Imbalance (Chronic Kidney Disease)  Goal: Electrolyte Balance  Outcome: Ongoing, Progressing     Plan of care reviewed with pt . Pt had a fever overnight of 101.3 ax given tylenol 325 suppository and effective. T. Was 99.1 1 hour later. He remains afebrile at this time.     Patient may also need pain managed more. When re postioning and providing perineal care , patient often moans and groans . Tylenol isn't always effective.

## 2020-05-04 NOTE — PROGRESS NOTES
"Hospital Medicine  Progress Note  [facility]      Patient Name: Vaughn Retana  MRN:  [id number]  Hospital Medicine Team: [facility] MED K Charmaine Pedro MD  Date of Admission:  4/22/2020     Length of Stay:  LOS: 12 days     Principal Problem:  Covid-19 Virus Infection       History of Present Illness:    Mr. Vaughn Retana is a 54 y.o. male with hypertension, intracranial bleed, end-stage renal on dialysis MWF,L BKA 2/2 osteomyelitis, history of DVT, history of heart failure including pulmonary hypertension with normal EF (last 2017), hx upper GI bleeds, PEG status, seizures, latent TB presented from [facility] for evaluation of shortness of breath.  Patient isn't able to speak, so history is obtained via yes/no questions and ER documentation.  He denies any SOB, cough, fever, or chills and says he feels ok with no pain.  Of note, he just had an extensive hospitalization at [facility] from 3/28-4/15 for COVID.  At the end of his hospital course, he was transfused blood and decided to maintain a conservative approach so GI wasn't involved.  Additionally, he was DNR during the last hospital stay.    Upon arrival to the ER, vitals were temp 98.9F with HR 110s and satting 100% on 2L NC.  CXR showed improved consolidations from his recent hospitalization.  He was given Vanc and Zosyn and was admitted to Hospital Medicine for further management.    Interval History:      Denies complaints, a little less talkative today but his baseline is to have days he is more/less talkative this week. Alert to examiner. Nods when asked "you ready to go home". Says no when asked if anything bothering him. Has had intermittent fevers but ID has assessed him and feel related to his clots, his wbc is stable and not worse or with left shift, and all work up for this extensively for infection has been negative. Fever overnight simiar to other days fever curve, looks same in person and not a septic source " I see still throughout the week, clots seem likely to me as well as ID. crp is stable. Hg Is stable, long discussion with sister yeterday over risk/benefits of anticoag vs life threatnign gi bleeds he has ha 5 timesin last year, see plan of care yesterday, ultimately decided to hold off on anticoagulation at discharge as he is on room air, clots in leg chronic, and the lbleeding risk causing an untimtely death seems higher than the risk of the clots causing issues, she  Is aware they both can cause issues including death, but the bleeding risk we both agree is more significant here at this moment. He ismed ready to go back to [facility], awating to see if bed ready      4/29: patient remains tachycardic- EKG NSR today. Hg responded well to 1UPRBC administered at HD last night. No reports of overt bleeding. Patient remains most appropriate for hospice care but family without any decision toward palliative transition. Appreciate palliative team assistance.     4/28 + fever again AM. Re-dosed vancomycin and zosyn--cultures remain unremarkable. Repeat CXR and abdominal film without obvious source. Vancomycin random level therapeutic. CT head completed 4/27 without any acute changes. Repeat Lactate < 2. Hg stable. No leukocytosis present. Troponin elevated but plateaued in setting of ESRD. PEG tube looks healthy-no signs of infection. Stage 2 sacral wound present but unchanged--does not appear necrotic and no discharge to indicate this as source.     S/p declot 4/28 with vascular surgery. Have confirmed that dialysis will be scheduled for today and he can receive one UPRBC. Have stopped heparin gtt see conversation with family documented below for details--Hg drop in setting of recurrent GI bleeds. Discussed with family-decision to hold heparin at this time as they are more concerned about bleeding risk.     Previous Concern for G+ cocci in blood sample- but more likely contaminant as one bottle with cogulase negative  staph. Continue on zosyn for possible PNA but CXR improved. Hg low on admission--GI has evaluated, Hg responded to 1 UPRBC and no plan for elective procedure at this time. D-dimer at 6 now 4-- concern for GI bleeding risk. Concern for another infectious process than covid at this time.       Review of Systems:  Unable to obtain 2/2 aphasia      Past Medical History: Patient has a past medical history of Amputation stump pain (9/10/2013), Aspiration pneumonia (7/27/2015), Asterixis (11/8/2016), C. difficile colitis (8/7/2015), Cholelithiasis without obstruction (8/25/2015), Chronic diastolic heart failure, Chronic low back pain (12/1/2015), Closed head injury (9/8/2016), DVT (deep venous thrombosis) (7/28/2017), ESRD on hemodialysis (2/7/2013), GERD (gastroesophageal reflux disease), HCV antibody positive, Hemiparesis affecting left side as late effect of stroke (11/08/2016), History of Intracerebral Hemorrhage: L BG 5/2013; R BG 9/2016; R BG 11/2016; L caudate head 2/2017 (11/2/2016), Hypertension, left basal ganglia ICH 5/2013 (11/2/2016), Left Caudate Head ICH 2/22/2017 (2/24/2017), 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      Past Surgical History: Patient has a past surgical history that includes R AVF (9/12/12); Leg amputation through knee (12/18/2013); Foot amputation through metatarsal; Colonoscopy; Colonoscopy (N/A, 4/4/2017); Esophagogastroduodenoscopy (N/A, 6/12/2018); Upper gastrointestinal endoscopy; Esophagogastroduodenoscopy (N/A, 3/7/2019); Esophagogastroduodenoscopy (N/A, 9/23/2019); Esophagogastroduodenoscopy (N/A, 10/2/2019); Esophagogastroduodenoscopy  (N/A, 11/12/2019); Esophagogastroduodenoscopy (N/A, 2/14/2020); Fistulogram (Right, 4/28/2020); and Declotting of vascular graft (N/A, 4/28/2020).      Social History: Patient reports that he has quit smoking. He has a 10.00 pack-year smoking history. He has never used smokeless tobacco. He reports that he does not drink alcohol or use drugs.      Family History: Patient's family history includes Alcohol abuse in his maternal grandmother; Diabetes in his brother and maternal grandfather; Early death in his mother; Heart disease in his father; Hyperlipidemia in his father; Hypertension in his father and sister; Kidney disease in his father.      Medications: Scheduled Meds:   [START ON 5/5/2020] sodium chloride 0.9%   Intravenous Once    albuterol  2 puff Inhalation Q6H    bisacodyL  10 mg Rectal Once    epoetin la nena-ebpx (RETACRIT) injection  50 Units/kg Intravenous Every Tues, Thurs, Sat    levetiracetam oral soln  250 mg Per G Tube BID    metoclopramide HCl  5 mg Per G Tube QID    pantoprazole  40 mg Per G Tube BID    senna-docusate 8.6-50 mg  1 tablet Per G Tube BID     Continuous Infusions:    PRN Meds:.[START ON 5/5/2020] sodium chloride 0.9%, acetaminophen, dextromethorphan-guaifenesin  mg/5 ml, Dextrose 10% Bolus, Dextrose 10% Bolus, glucose, glucose, HYDROcodone-acetaminophen, insulin aspart U-100, melatonin, midodrine, ondansetron, sodium chloride 0.9%      Allergies: Patient is allergic to fosrenol [lanthanum].      Physical Exam:    Temp:  [97.8 °F (36.6 °C)-101.3 °F (38.5 °C)]   Pulse:  []   Resp:  [14-22]   BP: (104-146)/(71-82)   SpO2:  [99 %]     Constitutional: Appears sickly chronically. Basic yes/no answers at times, sometimes does not respond. tday more responsive.  Head: Normocephalic and atraumatic.   Eyes: EOM are normal. Pupils are equal, round, and reactive to light. No scleral icterus.   Neck: Normal range of motion. Neck supple.   Cardiovascular: Normal heart rate.   Regular heart rhythm.  No murmur heard.  Pulmonary/Chest: Comfortable on 2L NC.  Coarse breath sounds throughout  Abdominal: Soft. Bowel sounds are normal.  No distension.  No tenderness  Musculoskeletal: Left BKA.  Right leg contractures  Neurological: Alert and making eye contact at times.  Intermittent answering questions  Skin: Skin is warm and dry.   Psychiatric: Normal mood and affect. Behavior is normal.       Laboratory:  Recent Labs   Lab 05/02/20 0337 05/03/20 0341 05/04/20 0513   WBC 11.04 11.03 11.32   LYMPH 10.6*  1.2 10.8*  1.2 13.0*  1.5   HGB 7.4* 7.3* 7.7*   HCT 24.0* 24.9* 25.9*    374* 414*     Recent Labs   Lab 04/30/20  0400 05/01/20  0202 05/02/20 0337 05/03/20 0341 05/03/20 0723 05/04/20 0513    139 135* 140  --  140   K 3.3* 3.5 3.5 4.6  --  4.5   CL 99 103 99 103  --  103   CO2 26 25 25 24  --  23   BUN 60* 37* 62* 29*  --  56*   CREATININE 3.5* 3.2* 4.5* 2.9*  --  4.2*   * 296* 301* 251*  --  234*   CALCIUM 8.9 9.5 9.8 9.2  --  9.9   MG 2.0 1.9  --   --  1.9  --    PHOS 1.7* 1.6* 3.6  --  2.8  --      Recent Labs   Lab 04/29/20  1139  05/02/20 0337 05/03/20 0341 05/04/20  0513   ALKPHOS  --    < > 125 127 118   ALT  --    < > 47* 37 27   AST  --    < > 57* 52* 33   ALBUMIN  --    < > 1.3* 1.3* 1.4*   PROT  --    < > 7.7 7.9 7.9   BILITOT  --    < > 0.3 0.3 0.3   INR 1.2  --   --   --   --     < > = values in this interval not displayed.      Recent Labs     05/03/20 0723   .4*       All labs within the last 24 hours were reviewed.     Microbiology:  Lab Results   Component Value Date    [id number] Positive (A) 04/22/2020       Microbiology Results (last 7 days)     Procedure Component Value Units Date/Time    Blood culture [[id number]] Collected:  05/01/20 0202    Order Status:  Completed Specimen:  Blood Updated:  05/04/20 0612     Blood Culture, Routine No Growth to date      No Growth to date      No Growth to date      No Growth to date     Narrative:       Collection has been rescheduled by RF at 05/01/2020 00:10 Reason:   Unable to collect specimen x 3  Collection has been rescheduled by RF at 05/01/2020 00:31 Reason: ty   was notified to try this patient  Collection has been rescheduled by RF at 05/01/2020 00:10 Reason:   Unable to collect specimen x 3  Collection has been rescheduled by RF at 05/01/2020 00:31 Reason: ty   was notified to try this patient    Blood culture [[id number]] Collected:  05/01/20 0201    Order Status:  Completed Specimen:  Blood Updated:  05/04/20 0612     Blood Culture, Routine No Growth to date      No Growth to date      No Growth to date      No Growth to date    Narrative:       Collection has been rescheduled by RF at 05/01/2020 00:10 Reason:   Unable to collect specimen x 3  Collection has been rescheduled by RF at 05/01/2020 00:31 Reason: ty   was notified to try this patient  Collection has been rescheduled by RF at 05/01/2020 00:10 Reason:   Unable to collect specimen x 3  Collection has been rescheduled by RF at 05/01/2020 00:31 Reason: ty   was notified to try this patient    Blood culture [[id number]] Collected:  04/27/20 1039    Order Status:  Completed Specimen:  Blood Updated:  05/01/20 1222     Blood Culture, Routine No Growth after 4 days.     Narrative:       Collection has been rescheduled by 3 at 04/27/2020 09:57 Reason:   Unable to collect  Collection has been rescheduled by 3 at 04/27/2020 10:03 Reason:   Unable to collect  Collection has been rescheduled by 3 at 04/27/2020 10:04 Reason:   spoke with ISMAEL Roland  Collection has been rescheduled by 3 at 04/27/2020 09:57 Reason:   Unable to collect  Collection has been rescheduled by SM3 at 04/27/2020 10:03 Reason:   Unable to collect  Collection has been rescheduled by 3 at 04/27/2020 10:04 Reason:   spoke with ISMAEL Roland    Blood culture [[id number]] Collected:  04/25/20 0804    Order Status:  Completed Specimen:  Blood Updated:  04/29/20 1012  "    Blood Culture, Routine No Growth after 4 days.     Narrative:       ADD-ON CRP #[id number] Per. mitch Calzada MD  04/25/2020  08:33             Imaging  ECG Results          EKG 12-lead (Edited Result - FINAL)  Result time 04/23/20 12:54:59    Edited Result - FINAL by Interface, Lab In [facility] (04/23/20 12:54:59)                 Narrative:    Test Reason : R06.00,    Vent. Rate : 113 BPM     Atrial Rate : 113 BPM     P-R Int : 132 ms          QRS Dur : 094 ms      QT Int : 342 ms       P-R-T Axes : 082 067 083 degrees     QTc Int : 469 ms    Age and gender specific analysis  Sinus tachycardia  Incomplete right bundle branch block  Nonspecific T wave abnormality  Cannot exclude Lateral infarct  Abnormal ECG  When compared with ECG of 28-MAR-2020 13:40,  Left anterior fascicular block is no longer Present  lateral infarct is now present  Reconfirmed by KYLER AVERY MD ([id number]) on 4/23/2020 12:54:46 PM    Referred By: [email or web address]   SELF           Confirmed By:KYLER AVERY MD                              No results found for this or any previous visit.    X-Ray Chest 1 View  Narrative: EXAMINATION:  XR CHEST 1 VIEW    CLINICAL HISTORY:  Provided history is "sepsis, mildly tachypneic, COVID+;  ".    TECHNIQUE:  One view of the chest.    COMPARISON:  Chest radiograph, 04/27/2020 and 04/22/2020.  CTA chest, 04/24/2020.    FINDINGS:  Cardiac wires overlie the chest.  Cardiomediastinal silhouette is not enlarged.  There is no focal consolidation.  No sizable pleural effusion.  No pneumothorax.  No detrimental change in lung aeration when compared with the prior study.  Impression: No focal consolidation or detrimental change when compared with 04/27/2020.    Electronically signed by: Byron Maynard MD  Date:    05/01/2020  Time:    00:38      All imaging within the last 24 hours was reviewed.       Assessment and Plan:    Active Hospital Problems    Diagnosis  POA    *COVID-19 virus infection [U07.1]  Yes    " Hyperglycemia [R73.9]  Yes    Leukocytosis [D72.829]  Yes    Pulmonary embolism [I26.99]  Yes    Palliative care encounter [Z51.5]  Not Applicable    Advanced care planning/counseling discussion [Z71.89]  Not Applicable    Goals of care, counseling/discussion [Z71.89]  Not Applicable    ESRD on dialysis [N18.6, Z99.2]  Not Applicable    Pressure injury due to medical device [T85.898A, L89.90]  Yes    Seizure [R56.9]  Yes    Chronic blood loss anemia [D50.0]  Yes    Hemodialysis-associated hypotension [I95.3]  Yes    Severe malnutrition [E43]  Yes     Assessment and Plan  Severe Malnutrition in the context of Social/Environmental Circumstances     Related to (etiology):  Unknown etiology     Signs and Symptoms (as evidenced by):  Energy Intake: per pt, <75% intake over the last year  Body Fat Depletion: overall subcutaneous fat loss, moderate triceps fat loss and protruding patellas.  Muscle Mass Depletion:  moderate muscle wasting of interosseous, temporal, clavicle, calves and quadriceps  Weight Loss: 24% (39 lbs) x 1 year    Recommendations     Recommendation/Intervention: 1. Should pt be cleared for po diet, recommend renal diet with texture per SLP along with Novasource ONS TID. 2. Should pt require enteral feeding, initiate Novasource renal formula at 10ml/hr and advance 10ml Q4hrs to goal rate of 40ml/hr (provides 1920kcal, 87g pro, 691ml free water). Provide additional 500ml water flush/24 hrs. Hold for residuals >500ml.  Goals: 1. Pt to receive nutrition < 48 hrs.      Erosive gastritis [K29.60]  Yes    Chronic upper GI bleeding [K92.2]  Yes    Chronic kidney disease-mineral and bone disorder [N18.9, E83.9, M89.9]  Yes     Chronic    Chronic diastolic heart failure [I50.32]  Yes     Chronic     2-23-17    1 - Low normal to mildly depressed left ventricular systolic function (EF 50-55%).     2 - Right ventricular enlargement with mildly depressed systolic function.     3 - Left ventricular  diastolic dysfunction.     4 - Right atrial enlargement.     5 - Severe tricuspid regurgitation.     6 - Pulmonary hypertension. The estimated PA systolic pressure is 86 mmHg.     7 - Increased central venous pressure.       History of Intracerebral Hemorrhage: L BG 5/2013; R BG 9/2016; R BG 11/2016; L caudate head 2/2017 [Z86.79]  Not Applicable     Chronic    Acute respiratory failure with hypoxia [J96.01]  Yes    Stenosis of arteriovenous dialysis fistula [T82.858A]  No    Anemia in chronic kidney disease [N18.9, D63.1]  Yes     Chronic      Resolved Hospital Problems   No resolved problems to display.       Covid-19 Virus Infection  - COVID-19 testing: Positive on 3/25 and again on 4/22  - Isolation: Airborne/Droplet. Surgical mask on patient. Notify Infection Control  - Diagnostics: Trend Q48hrs if stable, more frequently if patient decompensating.  Lymphopenia, hyponatremia, hyperferritinemia, elevated troponin, elevated d-dimer, age, and comorbidities are significant predictors of poor clinical outcome)  o CBC:  WBC 15  o CMP:  ESRD on HD  o Ferritin:    o D-dimer:    o CRP:  270  o BNP:    o CPK:  957  o LDH:  226  o Troponin:  0.439  o Procalcitonin:  5.39  o CXR:  Improving consolidations  o Blood culture x2 4/22/2020 NGTD  o Sputum culture 4/22/2020 NGTD    - Management: Per [facility] COVID Treatment Protocol (4/15/20)    - Monitoring:   - Telemetry & Continuous Pulse Oximetry    - Nutrition:    - Multivitamin PO daily   - Add Boost supplement   - Vitamin D 1000IU daily if deficient   - Ascorbic acid 500mg PO bid    - Supportive Care:   - acetaminophen 650mg PO Q6hr PRN fever/headache   - loperamide PRN viral diarrhea   - IVF if indicated, restrictive strategy preferred, no maintenance IV if able   - VTE PPx: enoxaparin or heparin SQ unless contraindicated    - Antibiotics:  - if indications, CXR findings, elevated procal. See protocol for alternatives.   - Discontinue early if low concern for bacterial  co-infection   - Vanc/Zosyn given COVID and recent hospitalization    Acute Hypoxemic Respiratory Failure  Acute Respiratory Disease 2/2 COVID19  - Order RT consult via Respiratory Communication for COVID Protocols  - Order Incentive Spirometer Q4h  - Order Flutter Valve Q4h  - Continuous Pulse Oximetry  - Goal SpO2 92-96%  - Supplemental O2 via LFNC, VentiMask, or HFNC (see Respiratory Support Oxygen Therapies)  - If wheezing, albuterol INH Q6h scheduled & PRN  - Proning Protocol if patient is a candidate (see [facility] Proning Protocol)   - GCS >13, able to self-prone  - If deterioration, may warrant trial of NIPPV and transfer to [facility] pressure room or immediate ICU consult  - CXR with multifocal opacities  - Satting 100% on 2L NC  - Continue Vanc/Zosyn given recent hospitalization currently, discuss with ID as difficulty finding source. Maybe blood clots?   - ID recs: complete 7 days zosyn (End date 4/29), continue to monitor clinically.   4/30: off anitbiotics now.    Chronic Upper GIB  Erosive Gastritis  · Reports of recurrent and chronic GIB  · Last EGD 2/14/20 with non-bleeding erosive gastropathy  · Continue PPI BID  · GI consult given severe drop in anemia, no plans for EGD currently  · 4/27: Hg 8 stable    · 4/28: Slight trend downward in Hg 7.4 with tachycardia, transfuse 1UPRBC with HD.   · 4/30: downtrend again today to 7, may need transfusion tomorrow. No clear signs of leeding but known hx of upper bleeds, 5 EGDs in last year, last in feb, GI aware of patient thi sadmit  · 5/1: stable at 7.3  · 5/4:7.7 stable    Chronic Blood Loss Anemia  Anemia in CKD  · Hemoglobin 7.2 on admit with tachycardia  · Was 9 2 weeks ago at DC  · Type and screen ordered  · 4/28: Transfused 1 unit PRBCs  · 4/29: Repeat Hg this AM of 8 today  · 4/30: down to 7 again. Trend. .may need tx tomorrow if stays on this trend, no signs of activ ebleeding, BP is stable.  · 5/1: 7.3, stable    History of ICH  · 2013  · Resides at [facility]  · 4/28: Repeat CT scan today, no overt neurological changes but patient appears more lethargic.     Chronic Diastolic Heart Failure  · Chronic and stable  · Not on BP medications    HD Associated Hypotension  · Chronic issue  · Continue Midodrine with HD MWF    CKD Mineral and Bone Disorder  ESRD on HD  · Chronic and stable  · 5/1: stop renvela as phos is in the 1's, replace phos today.    Seizure Disorder  · Chronic and stable  · Continue Keppra 250mg BID  · Seizure precautions    Sacral Pressure Injury  · Wound Care consult    Lower Extremity DVT  - on heparin gtt currently  - due to high bleeding risk hesistant to commit patient to long term oral anticoagulation especially as this may be a chronic thrombus, heparin restarted 4/29, but hg lower again today 4/30, may have to stop again if risk outweighs benefit  5/1: hg stable so continuing  52: need to decide long term planning, oral meds vs lovenox full dose vs do we anticoag   5/4: discussed with sister, given his sever life threatnign bleeds, 5 in last year at least, have decided the bleeding risk is higher than theclot risk so will hold anticoagulation on dc    Rt lower lung Pulmonary embolism  - treatment heparing gtt.   - extensive discussion with family concerning risk/benefit of anticoagulation in history of recurrent UGI bleeding. We discussed issues that patient drop in Hg again 4/28. No hematemesis and no bowel movement today but remains concerning. They agree that they would like to hold the anticoagulation at this time until assess for GI bleeding.  Will monitor overnight but he may need to be restarted on heparin if Hg stable after transfusion.   -4/29: monitored overnight, appropriate increase in HG with transfusion, no reports of melena or hematemesis, EKG with sustained NSR, will restart heparin gtt at low intensity.   4/30: see LE DVT, may have to consider stopping again given hg downtrend again if continuse tomorrow  5/1: hg stable so  continuing  5/2: long term planning, will dicsuss with sister    Fever  -unclear etiology still, has had large workup in recent days, cultured many times without results besides 1 coag neg staph, treated but wasn't thought to be true infection, ID saw patient and thought it was clots as source, CRP downtrend  -continue to trend, work up again 5/1 without other causes seen as yet, clots seems likely fever source from work up as yet  5/4: clots is the likely sourec as all other work up negative, ID has seen patient and also agreed, crp and wbc stable despite occasoinal and intermittent 100-101 fevers, off antibiotics over weekend and has not decompensatesd clinically at all    Patient's chronic/stable medical conditions noted in the assessment above will be managed with the patient's home medications as tolerated.      Diet:  Renal pleasure feeds, TF  VTE PP: holding due to bleeding risk  Goals of Care:  Full code based on paperwork from NH.  Was DNR on previous admission.    4/25: sister updated on phone at 2:40pm    4/27: updated sister on phone. We discussed the significant complexity of medical issues and that my recommendation would be to begin a transition to hospice capacity. She will discuss further with her brother but no decisions made yet. We have discussed continuing heparin gtt in hospital for today but long term my recommendation is to avoid anticoagulation as he has long history of life threatening recurent GI bleeding.  I was very straight forward that he may not recover from this hospitalization due to significant comorbidities, infection, pulmonary embolism, heart failure, ESRD, bed bound, dementia with severe neurological issues related to prior ICH/stroke and malnutrition with sacral stage 2 wound.     4/28: updated sister at 4/28. We discussed his declot and plans for dialysis. I informed her that he needs 1U PRBC today as his Hg dropped. She agrees with holding the heparin gtt tonight to ensure  no evidence of bleeding before restarting potentially in AM.  (recurrent hospital admissions for hematemesis). Again emphasized the appropriateness of transition to palliative. Patient with very poor quality of life.     5/1: updated sister, ismael kike has been discussing overall long term goals daily  5/2: patient anxious for dc, will discuss in what capacity with sister more tmorrow, need anticoag decision is biggest thing now to decide for dc and the spectrum of care we will focus on for him at [facility]  5/3: patient again just says I want to go home. musa call sister to discuss plans for this  5/4: awaiting dc to [facility] if bed available today for alf

## 2020-05-04 NOTE — CARE UPDATE
Rapid Response Nurse Chart Check     Chart check completed, abnormal VS noted at 0000.  T 101.3    RR 21  SpO2 99% on RA    Tylenol 650 mg PO administered at 2200 for pain. One time tylenol 325 mg rectal administered at 0117.     Bedside RNSofia contacted, no concerns verbalized at this time. Instructed to call [phone] for further concerns or assistance.

## 2020-05-04 NOTE — MEDICAL/APP STUDENT
Spoke with Mr. Guardado sister (Alicia, [phone]) and introduced myself by stating that I am a medical student at [employer] on [facility] and that I am working with Dr. Charmaine Pedro (pts attending physician). Informed Ms. Vargas that patients blood counts are still stable today and he didnt have any issues overnight per nursing. Patient nods and states that he is ready to go home. Patients  will speak with [facility] to make sure the facility is able to admit him again; all orders are ready for discharge to [facility] whenever the facility is ready to accept him. Nursing team noted that the patient appeared to be in slight pain when turned so Dr. Charmaine Pedro placed an order for a slightly higher dose of Norco if patient appears to be in pain when turned/changed again today. Patients sister did not have any questions or concerns. Informed patients sister that she can call the hospital at any time and ask the  to be transferred to Mr. Debby floor to speak with someone on his care team if she has any further questions that come up later today. Stated that I will be calling back tomorrow with any updates.

## 2020-05-04 NOTE — PLAN OF CARE
Problem: Wound  Goal: Optimal Wound Healing  Outcome: Met     Problem: Wound  Goal: Optimal Wound Healing  Outcome: Met     Problem: Fall Injury Risk  Goal: Absence of Fall and Fall-Related Injury  Outcome: Met     Problem: Adult Inpatient Plan of Care  Goal: Plan of Care Review  Outcome: Met  Goal: Patient-Specific Goal (Individualization)  Outcome: Met  Goal: Absence of Hospital-Acquired Illness or Injury  Outcome: Met  Goal: Optimal Comfort and Wellbeing  Outcome: Met  Goal: Readiness for Transition of Care  Outcome: Met  Goal: Rounds/Family Conference  Outcome: Met

## 2020-05-04 NOTE — CARE UPDATE
Pt sitting very low in bed and refused to be moved or try to sit up. Says he is comfortable there and not SOB.

## 2020-05-04 NOTE — PLAN OF CARE
[facility]     Department of Hospital Medicine     [address] [place] [address]     [phone] after hours  [phone] fax       NURSING HOME ORDERS    05/04/2020    Admit to Nursing Home:  Regular Bed      Diagnoses:  Active Hospital Problems    Diagnosis  POA    *COVID-19 virus infection [U07.1]  Yes    Hyperglycemia [R73.9]  Yes    Leukocytosis [D72.829]  Yes    Pulmonary embolism [I26.99]  Yes    Palliative care encounter [Z51.5]  Not Applicable    Advanced care planning/counseling discussion [Z71.89]  Not Applicable    Goals of care, counseling/discussion [Z71.89]  Not Applicable    ESRD on dialysis [N18.6, Z99.2]  Not Applicable    Pressure injury due to medical device [T85.898A, L89.90]  Yes    Seizure [R56.9]  Yes    Chronic blood loss anemia [D50.0]  Yes    Hemodialysis-associated hypotension [I95.3]  Yes    Severe malnutrition [E43]  Yes     Assessment and Plan  Severe Malnutrition in the context of Social/Environmental Circumstances     Related to (etiology):  Unknown etiology     Signs and Symptoms (as evidenced by):  Energy Intake: per pt, <75% intake over the last year  Body Fat Depletion: overall subcutaneous fat loss, moderate triceps fat loss and protruding patellas.  Muscle Mass Depletion:  moderate muscle wasting of interosseous, temporal, clavicle, calves and quadriceps  Weight Loss: 24% (39 lbs) x 1 year    Recommendations     Recommendation/Intervention: 1. Should pt be cleared for po diet, recommend renal diet with texture per SLP along with Novasource ONS TID. 2. Should pt require enteral feeding, initiate Novasource renal formula at 10ml/hr and advance 10ml Q4hrs to goal rate of 40ml/hr (provides 1920kcal, 87g pro, 691ml free water). Provide additional 500ml water flush/24 hrs. Hold for residuals >500ml.  Goals: 1. Pt to receive nutrition < 48 hrs.      Erosive gastritis [K29.60]  Yes    Chronic upper GI bleeding  [K92.2]  Yes    Chronic kidney disease-mineral and bone disorder [N18.9, E83.9, M89.9]  Yes     Chronic    Chronic diastolic heart failure [I50.32]  Yes     Chronic     2-23-17    1 - Low normal to mildly depressed left ventricular systolic function (EF 50-55%).     2 - Right ventricular enlargement with mildly depressed systolic function.     3 - Left ventricular diastolic dysfunction.     4 - Right atrial enlargement.     5 - Severe tricuspid regurgitation.     6 - Pulmonary hypertension. The estimated PA systolic pressure is 86 mmHg.     7 - Increased central venous pressure.       History of Intracerebral Hemorrhage: L BG 5/2013; R BG 9/2016; R BG 11/2016; L caudate head 2/2017 [Z86.79]  Not Applicable     Chronic    Acute respiratory failure with hypoxia [J96.01]  Yes    Stenosis of arteriovenous dialysis fistula [T82.858A]  No    Anemia in chronic kidney disease [N18.9, D63.1]  Yes     Chronic      Resolved Hospital Problems   No resolved problems to display.       Patient is homebound due to:  COVID-19 virus infection    Allergies:  Review of patient's allergies indicates:   Allergen Reactions    Fosrenol [lanthanum] Nausea And Vomiting     Nausea and vomiting       Vitals:       Every shift     Diet: renal diet per mouth as tolerated for pleasure feeds (Eats very little)    Plus :     Tube Feeding:   NovaSource Renal continuous Feeds        - Continuous at 35 cc/hr mL per hour    -free water 150 mL every 8 hours    - flush PEG tube with 10 cc every 8 hours, before and after medications, feeds, and residual checks.    Acitivities:      - Up in a chair each morning as tolerated   - Ambulate with assistance to bathroom   - Scheduled walks once each shift (every 8 hours)   - May ambulate independently   - May use walker, cane, or self-propelled wheelchair   - Weight bearing: as tolerated     LABS:  Per facility protocol    CBC weekly   CMP weekly        Nursing Precautions:    - Aspiration precautions:              - Total assistance with meals            -  Upright 90 degrees befor during and after meals             -  Suction at bedside          - Fall precautions per nursing home protocol   - Seizure precaution per [facility] protocol   - Decubitus precautions:        -  for positioning   - Pressure reducing foam mattress   - Turn patient every two hours. Use wedge pillows to anchor patient    CONSULTS:       Speech Therapy  to evaluate and treat     Nutrition to evaluate and recommend diet         MISCELLANEOUS CARE:                    PEG Care:  Clean site every 24 hours     Arcos Care: Empty Arcos bag every shift.  Change Arcos every month     Routine Skin for Bedridden Patients:  Apply moisture barrier cream to all    skin folds and wet areas in perineal area daily and after baths and                           all bowel movements.    To areas of sacral Skin breakdown: apply triad barrier cream BID and PRN cleaning with bathing cloths. Elevate heels off bed to avoid skin breakdown.      Contact and droplet precautions for COVID-19, per facility protocol.    Hemodialysis per outpatient schedule, Tues/Thurs/Saturday.                               DIABETES CARE:        Check blood sugar:          Fingerstick blood sugar AC and HS   Fingerstick blood sugar every 6 hours if unable to eat      Report CBG < 60 or > 400 to physician.                                          Insulin Sliding Scale          Glucose  Novolog Insulin Subcutaneous        0 - 60   Orange juice or glucose tablet, hold insulin      No insulin   201-250  2 units   251-300  4 units   301-350  6 units   351-400  8 units   >400   10 units then call physician      Medications: Discontinue all previous medication orders, if any. See new list below.      Vaughn Retana   Home Medication Instructions ANABEL:[id number]    Printed on:05/04/20 7640   Medication Information                      acetaminophen (TYLENOL) 325 MG tablet  Take 2  tablets (650 mg total) by mouth every 8 (eight) hours as needed for mild pain 1-3 scale and for fevers over 101.             albuterol (PROVENTIL/VENTOLIN HFA) 90 mcg/actuation inhaler  Inhale 2 puffs into the lungs every 6 (six) hours PRN for wheezing           bisacodyL (DULCOLAX) 10 mg Supp  Place 1 suppository (10 mg total) rectally daily as needed (if no BM x  2 days).             dextromethorphan-guaifenesin  mg/5 ml (ROBITUSSIN-DM)  mg/5 mL liquid  10 mLs by Per G Tube route every 4 (four) hours as needed for cough.             HYDROcodone-acetaminophen (NORCO) 5-325 mg per tablet  1 tablet by Per G Tube route every 6 (six) hours as needed for pain scale 4-10, signs of pain (grimacing, facial cues) with turning, cleaning, PRN.             insulin aspart U-100 (NOVOLOG) 100 unit/mL (3 mL) InPn pen  Inject 0-5 Units into the skin before meals and at bedtime as needed (Hyperglycemia).  - per above protocols for sliding scale           levetiracetam oral soln 500 mg/5 mL (5 mL) Soln  2.5 mLs (250 mg total) by Per G Tube route 2 (two) times daily.             metoclopramide HCl (REGLAN) 5 MG tablet  1 tablet (5 mg total) by Per G Tube route 4 (four) times daily.             midodrine (PROAMATINE) 5 MG Tab  1 tablet (5 mg total) by Per G Tube route as needed (hypotension with dialysis). Per dialysis doctor protocols             ondansetron (ZOFRAN) 8 MG tablet  Take 1 tablet (8 mg total) by mouth every 12 (twelve) hours as needed for Nausea.             pantoprazole (PROTONIX) 40 mg GrPS  1 packet (40 mg total) by Per G Tube route 2 (two) times daily.             senna-docusate 8.6-50 mg (PERICOLACE) 8.6-50 mg per tablet  1 tablet by Per G Tube route 2 (two) times daily.                       _________________________________  Charmaine Pedro MD  05/04/2020

## 2020-05-05 NOTE — PLAN OF CARE
05/05/20 0929   Final Note   Assessment Type Final Discharge Note   Anticipated Discharge Disposition [facility]

## 2020-07-24 NOTE — CONSULTS
"[facility]  Gastroenterology  Consult Note    Patient Name: Vaughn Retana  MRN: [id number]  Admission Date: 5/21/2018  Hospital Length of Stay: 0 days  Code Status: Full Code   Attending Provider: Lainey Wheat MD   Consulting Provider: Brittani Barajas MD  Primary Care Physician: Primary Doctor No  Principal Problem:Chronic upper GI bleeding    Inpatient consult to Gastroenterology  Consult performed by: BRITTANI BARAJAS  Consult ordered by: LUCIANA HENRIQUEZ        Subjective:     HPI:  52 year old male with a history of HTN, DM Type 2, ESRD, ICH, and PUD/reflux esophagitis on who GI is being consulted for hematemesis.    Per primary team:  "Patient presents with hematemesis x 4 days.  Pt reports last episode of vomiting was yesterday.  He reports noncompliance with bid PPI stating he last took ~1 week ago.  +epigastric pain after vomiting.  No fever, chills, diarrhea, SOB, dizziness, lightheadedness.  Of note this is pt's 5th admission since Feb 2018.  Noted last admission pt with AMS and reports from family stated behavior change x 1 year.  Pt stopped drinking alcohol several years ago and denies drug use."     Interval History:  In the ED he was given Zofran, PPI, and Haldol for his nausea/emesis. By the time we arrived to examine him he was very sleepy however could confirm episode of hematemesis as well as non-compliance with PPI.    Prior GI Procedures:  EGD 3/16/18:  - Normal examined duodenum.  - One gastric polyp. Resected and retrieved.  - Erythematous mucosa in the gastric body, antrum  and prepyloric region of the stomach. Biopsied.  - 4 cm hiatal hernia.  - LA Grade C reflux esophagitis.  - Non-bleeding gastric ulcer with a clean ulcer base (Lucas Class III).    EGD 3/8/18:  - LA Grade B reflux esophagitis.  - 3 cm sliding hiatal hernia.  - A single gastric polyp.  - Normal examined duodenum.    Past Medical History:   Diagnosis Date    Amputation stump pain 9/10/2013    " Aspiration pneumonia 7/27/2015    Asterixis 11/8/2016    C. difficile colitis 8/7/2015    Cholelithiasis without obstruction 8/25/2015    Chronic diastolic heart failure     2-23-17   1 - Low normal to mildly depressed left ventricular systolic function (EF 50-55%).    2 - Right ventricular enlargement with mildly depressed systolic function.    3 - Left ventricular diastolic dysfunction.    4 - Right atrial enlargement.    5 - Severe tricuspid regurgitation.    6 - Pulmonary hypertension. The estimated PA systolic pressure is 86 mmHg.    7 - Increased central venous pressure.    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 HD Patient MWF, K+ lab prior to procedure.     FOOT AMPUTATION THROUGH METATARSAL      left foot    LEG AMPUTATION THROUGH KNEE  12/18/2013    left BKA    R AVF  9/12/12       Review of patient's allergies indicates:   Allergen Reactions     Fosrenol [lanthanum] Nausea And Vomiting     Nausea and vomiting     Family History     Problem Relation (Age of Onset)    Alcohol abuse Maternal Grandmother    Diabetes Brother, Maternal Grandfather    Early death Mother    Heart disease Father    Hyperlipidemia Father    Hypertension Father, Sister    Kidney disease Father        Social History Main Topics    Smoking status: Former Smoker     Packs/day: 1.00     Years: 10.00    Smokeless tobacco: Never Used    Alcohol use No    Drug use: No    Sexual activity: Yes     Partners: Female     Birth control/ protection: None     Review of Systems   Constitutional: Negative for activity change, appetite change, chills, diaphoresis, fatigue and fever.   HENT: Negative for trouble swallowing.    Respiratory: Negative.    Cardiovascular: Negative.    Gastrointestinal: Positive for nausea. Negative for abdominal distention, abdominal pain, anal bleeding, blood in stool, constipation, diarrhea, rectal pain and vomiting.   Genitourinary: Negative.    Neurological: Negative.      Objective:     Vital Signs (Most Recent):  Temp: 98.2 °F (36.8 °C) (05/21/18 1947)  Pulse: 84 (05/21/18 1947)  Resp: 18 (05/21/18 1947)  BP: (!) 163/84 (05/21/18 1947)  SpO2: 100 % (05/21/18 1947) Vital Signs (24h Range):  Temp:  [97.6 °F (36.4 °C)-98.5 °F (36.9 °C)] 98.2 °F (36.8 °C)  Pulse:  [82-92] 84  Resp:  [15-18] 18  SpO2:  [98 %-100 %] 100 %  BP: (163-208)/() 163/84     Weight: 78.6 kg (173 lb 3 oz) (05/21/18 1534)  Body mass index is 27.95 kg/m².      Intake/Output Summary (Last 24 hours) at 05/21/18 2012  Last data filed at 05/21/18 1320   Gross per 24 hour   Intake             1100 ml   Output                0 ml   Net             1100 ml       Lines/Drains/Airways     Drain                 Hemodialysis AV Fistula Right upper arm -- days         Hemodialysis AV Fistula 03/08/18 1522 Right upper arm 74 days          Peripheral Intravenous Line                 Peripheral IV -  Single Lumen 05/21/18 1130 Left Antecubital less than 1 day                Physical Exam   Constitutional: No distress.   HENT:   Head: Normocephalic and atraumatic.   Eyes: No scleral icterus.   Neck: Normal range of motion.   Cardiovascular: Normal rate and regular rhythm.    Pulmonary/Chest: Effort normal and breath sounds normal.   Abdominal: Soft. Bowel sounds are normal. He exhibits no distension and no mass. There is no tenderness. There is no rebound and no guarding. No hernia.   ALAN: with brown stools   Musculoskeletal: He exhibits no edema.   L BKA   Neurological:   Patient sleepy but wakes up to his name   Skin: He is not diaphoretic.       Significant Labs:  CBC:   Recent Labs  Lab 05/21/18  1131   WBC 6.26   HGB 8.7*   HCT 26.5*        CMP:   Recent Labs  Lab 05/21/18  1131 05/21/18  1550   *  --    CALCIUM 8.4*  --    ALBUMIN 3.1*  --    PROT 7.9  --      --    K 2.9* 4.0   CO2 30*  --    CL 99  --    BUN 10  --    CREATININE 5.8*  --    ALKPHOS 142*  --    ALT 7*  --    AST 20  --    BILITOT 0.4  --      Coagulation:   Recent Labs  Lab 05/21/18  1131   INR 1.0   APTT 24.0       Significant Imaging:  Imaging results within the past 24 hours have been reviewed.    Assessment/Plan:     Normocytic anemia    52 year old male with a history of HTN, DM Type 2, ESRD, ICH, and PUD/reflux esophagitis on who GI is being consulted for hematemesis.    Based on recent EGD and reported noncompliance his hematemesis is likely secondary to worsening esophagitis/gastritis as he remains HD stable with Hgb at baseline.    Recommendations:  CLD now and NPO after MN for EGD  Transfuse H/H and transfuse as needed  PPI IV BID            Thank you for your consult. I will follow-up with patient. Please contact us if you have any additional questions.    Rodri Sheets M.D.  Gastroenterology Fellow, PGY-IV  Pager: [phone]  [facility]     denies

## 2020-08-05 PROBLEM — U07.1 COVID-19 VIRUS INFECTION: Status: RESOLVED | Noted: 2020-01-01 | Resolved: 2020-01-01

## 2020-08-05 PROBLEM — I69.398 SEIZURE DISORDER AS SEQUELA OF CEREBROVASCULAR ACCIDENT: Chronic | Status: ACTIVE | Noted: 2020-01-01

## 2020-08-05 PROBLEM — G40.909 SEIZURE DISORDER AS SEQUELA OF CEREBROVASCULAR ACCIDENT: Chronic | Status: ACTIVE | Noted: 2020-01-01

## 2020-08-05 PROBLEM — A41.9 SEPSIS DUE TO PNEUMONIA: Status: ACTIVE | Noted: 2020-01-01

## 2020-08-05 PROBLEM — Z51.5 PALLIATIVE CARE ENCOUNTER: Status: RESOLVED | Noted: 2020-01-01 | Resolved: 2020-01-01

## 2020-08-05 PROBLEM — K92.2 UGIB (UPPER GASTROINTESTINAL BLEED): Status: RESOLVED | Noted: 2020-01-01 | Resolved: 2020-01-01

## 2020-08-05 PROBLEM — Z99.2 END-STAGE RENAL DISEASE ON HEMODIALYSIS: Chronic | Status: ACTIVE | Noted: 2020-01-01

## 2020-08-05 PROBLEM — K31.84 GASTROPARESIS: Chronic | Status: ACTIVE | Noted: 2019-01-01

## 2020-08-05 PROBLEM — N18.6 END-STAGE RENAL DISEASE ON HEMODIALYSIS: Chronic | Status: ACTIVE | Noted: 2020-01-01

## 2020-08-05 PROBLEM — J18.9 SEPSIS DUE TO PNEUMONIA: Status: ACTIVE | Noted: 2020-01-01

## 2020-08-05 PROBLEM — K92.0 GASTROINTESTINAL HEMORRHAGE WITH HEMATEMESIS: Status: RESOLVED | Noted: 2019-01-01 | Resolved: 2020-01-01

## 2020-08-05 PROBLEM — R47.01 APHASIA: Chronic | Status: ACTIVE | Noted: 2020-01-01

## 2020-08-05 PROBLEM — R73.9 HYPERGLYCEMIA: Status: RESOLVED | Noted: 2020-01-01 | Resolved: 2020-01-01

## 2020-08-05 NOTE — ED NOTES
Pt arrived with brown colored soft diarrhea stool  Stage four decubitus noted to sacral area, measuring 7 cm in length and 9.5 cm in width, Right lower leg, black and unstageable, right ankle stage three decubitus measuring 4 cm in length and 2 cm in width, right foot decubitus stage three measuring 2 cm in length and 3 cm in width, stage 3 decubitus to inner side right fourth toe near right fifth toe dressed with 2X2 dressing, right heel black and unstableable, right fifth toe unstable and black and tip of right fifth toe unstable and black Dressing  noted t right buttock and hip, dressing noted to right knee and left stump

## 2020-08-05 NOTE — ED NOTES
PRATIK Rodgers informed of patient's bladder scan results; informed to not straight cath at this time.

## 2020-08-05 NOTE — ED PROVIDER NOTES
Encounter Date: 8/5/2020       History     Chief Complaint   Patient presents with    Tachycardia     Pt arrived to his dialysis apt and staff found pt to have a HR of 130-ST. Pt is non-verbal at baseline.      Vaughn Retana is a 55 y.o. male with medical history of ESRD on HD (MWF), Chronic HCV, Chronic gastritis, Esophagitis with recurrent GIB, CHF, L BKA, Decubitus Ulcers, Hx of CVA, COVID-19 positive 4/2020 presenting to the ED from HD clinic with the chief complaint of tachycardia. Patient noted to have heart rate of 130 at HD clinic today and EMS was subsequently called. He did not undergo dialysis today. No reports of hematemesis or melena. History is limited from patient as he is non-verbal which is his baseline per EMS and HD clinic reports.    Recently discharged from [facility] 2 days ago after admission for culture negative endocarditis and UTI. Patient arrived to [facility] yesterday. NH believes he is currently on Gentamycin. NH staff unable to provide further history.     The history is provided by the nursing home and the EMS personnel. The history is limited by the condition of the patient.      Review of patient's allergies indicates:   Allergen Reactions    Fosrenol [lanthanum] Nausea And Vomiting     Nausea and vomiting     Past Medical History:   Diagnosis Date    Amputation stump pain 9/10/2013    Aspiration pneumonia 7/27/2015    Asterixis 11/8/2016    C. difficile colitis 8/7/2015    Cholelithiasis without obstruction 8/25/2015    Chronic diastolic heart failure     2-23-17   1 - Low normal to mildly depressed left ventricular systolic function (EF 50-55%).    2 - Right ventricular enlargement with mildly depressed systolic function.    3 - Left ventricular diastolic dysfunction.    4 - Right atrial enlargement.    5 - Severe tricuspid regurgitation.    6 - Pulmonary hypertension. The estimated PA systolic pressure is 86 mmHg.    7 - Increased  central venous pressure.    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    Metabolic acidosis, IAG, reduced excretion of inorganic acids     Myoclonic jerking 9/20/2016    Noncompliance with medication regimen 12/4/2018    Secondary hyperparathyroidism (of renal origin)     Secondary pulmonary hypertension 3/23/2017    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    TB lung, latent 08/25/2015    Negative Quantiferon Gold 3-23-17     Past Surgical History:   Procedure Laterality Date    COLONOSCOPY      COLONOSCOPY N/A 4/4/2017    Procedure: COLONOSCOPY;  Surgeon: Walker Stern MD;  Location: [facility] ([address] [place] [address]);  Service: Endoscopy;  Laterality: N/A;  PA Systolic Pressure 85.56. HD Patient MWF, K+ lab prior to procedure.     DECLOTTING OF VASCULAR GRAFT N/A 4/28/2020    Procedure: Declot, Vascular Graft;  Surgeon: SHEA Alfonso III, MD;  Location: [facility] CATH LAB;  Service: Peripheral Vascular;  Laterality: N/A;    ESOPHAGOGASTRODUODENOSCOPY N/A 6/12/2018    Procedure: EGD (ESOPHAGOGASTRODUODENOSCOPY);  Surgeon: Man Galicia MD;  Location: [facility] ([address] [place] [address]);  Service: Endoscopy;  Laterality: N/A;  EGD in 8-12 weeks with Dr. Galicia on 4th floor for follow up erosive esophagitis and Mejia's surveillance.    Patient should be on Pantoprazole 40mg every 12 hours or the equivulant of another PPI    awaiting for patient to reply back  regarding changing    ESOPHAGOGASTRODUODENOSCOPY N/A 3/7/2019    Procedure: EGD (ESOPHAGOGASTRODUODENOSCOPY);  Surgeon: Man Galicia MD;  Location: [facility] (2ND FLR);  Service: Endoscopy;  Laterality: N/A;    ESOPHAGOGASTRODUODENOSCOPY N/A 9/23/2019    Procedure: EGD (ESOPHAGOGASTRODUODENOSCOPY);  Surgeon: Keanu Rainey MD;  Location: [facility] (2ND FLR);  Service: Endoscopy;  Laterality: N/A;    ESOPHAGOGASTRODUODENOSCOPY N/A 10/2/2019    Procedure: EGD (ESOPHAGOGASTRODUODENOSCOPY);  Surgeon: Kevin De La Paz MD;  Location: [facility] (2ND FLR);  Service: Endoscopy;  Laterality: N/A;    ESOPHAGOGASTRODUODENOSCOPY N/A 11/12/2019    Procedure: EGD (ESOPHAGOGASTRODUODENOSCOPY);  Surgeon: Kevin De La Paz MD;  Location: [facility] (2ND FLR);  Service: Endoscopy;  Laterality: N/A;    ESOPHAGOGASTRODUODENOSCOPY N/A 2/14/2020    Procedure: EGD (ESOPHAGOGASTRODUODENOSCOPY);  Surgeon: Kevin De La Paz MD;  Location: [facility] (2ND FLR);  Service: Endoscopy;  Laterality: N/A;    FISTULOGRAM Right 4/28/2020    Procedure: Fistulogram;  Surgeon: SHEA Alfonso III, MD;  Location: [facility] CATH LAB;  Service: Peripheral Vascular;  Laterality: Right;    FOOT AMPUTATION THROUGH METATARSAL      left foot    LEG AMPUTATION THROUGH KNEE  12/18/2013    left BKA    R AVF  9/12/12    UPPER GASTROINTESTINAL ENDOSCOPY       Family History   Problem Relation Age of Onset    Early death Mother     Kidney disease Father     Hypertension Father     Heart disease Father     Hyperlipidemia Father     Diabetes Brother     Alcohol abuse Maternal Grandmother     Diabetes Maternal Grandfather     Hypertension Sister     Melanoma Neg Hx      Social History     Tobacco Use    Smoking status: Former Smoker     Packs/day: 1.00     Years: 10.00     Pack years: 10.00    Smokeless tobacco: Never Used   Substance Use Topics    Alcohol use: No    Drug use: No     Review of Systems   Unable to perform ROS: Patient nonverbal        Physical Exam     Initial Vitals [08/05/20 0741]   BP Pulse Resp Temp SpO2   129/69 (!) 134 18 -- 98 %      MAP       --         Physical Exam    Constitutional: He is not diaphoretic. He appears cachectic. He appears ill. No distress.   HENT:   Head: Normocephalic and atraumatic.   Dry mucus membranes   Eyes: EOM are normal. Pupils are equal, round, and reactive to light.   Neck: Normal range of motion.   Cardiovascular: Regular rhythm. Tachycardia present.    RUE fistula +thrill  L upper chest central line   Pulmonary/Chest: No respiratory distress. He has no wheezes.   Abdominal: He exhibits no distension. There is no abdominal tenderness.   PEG tube in place   Genitourinary:    Genitourinary Comments: Rectal - Brown liquid stool     Musculoskeletal:      Comments: L BKA. Contractures in extremities.   Neurological: He is alert.   Non-verbal. Eyes track around the room. Moans with manipulation of extremities.   Skin: Skin is warm and dry.   Multiple decubitus and pressure ulcers to lower extremities         ED Course   Critical Care    Date/Time: 8/5/2020 2:06 PM  Performed by: Vishal Phillips PA-C  Authorized by: Salvador Perdomo MD   Direct patient critical care time: 15 minutes  Additional history critical care time: 6 minutes  Ordering / reviewing critical care time: 7 minutes  Documentation critical care time: 6 minutes  Consulting other physicians critical care time: 5 minutes  Total critical care time (exclusive of procedural time) : 39 minutes  Critical care was necessary to treat or prevent imminent or life-threatening deterioration of the following conditions: sepsis.  Critical care was time spent personally by me on the following activities: discussions with consultants, evaluation of patient's response to treatment, examination of patient, obtaining history from patient or surrogate, ordering and review of laboratory studies, ordering and review of radiographic studies, re-evaluation of  patient's condition and review of old charts.        Labs Reviewed - No data to display       Imaging Results    None          Medical Decision Making:   History:   I obtained history from: EMS provider and someone other than patient.  Old Medical Records: I decided to obtain old medical records.  Old Records Summarized: records from clinic visits and records from previous admission(s).       <> Summary of Records: Care everywhere lab work shows:  8/3/2020 - WBC 10.9, H/H 7.4/23.8, Plt 330, Crt 2.91, GFR 27, BUN 59  Independently Interpreted Test(s):   I have ordered and independently interpreted EKG Reading(s) - see summary below       <> Summary of EKG Reading(s): Sinus tachycardia 123 bpm. No STEMI  Clinical Tests:   Lab Tests: Ordered and Reviewed  Radiological Study: Ordered and Reviewed  Medical Tests: Ordered and Reviewed  Sepsis Perfusion Assessment: I attest, a sepsis perfusion exam was performed within 6 hours of Septic Shock presentation, following fluid resuscitation.  Other:   I have discussed this case with another health care provider.       <> Summary of the Discussion: Hospital Medicine       APC / Resident Notes:   55 y.o. male with medical history of ESRD on HD (MWF), Chronic HCV, Chronic gastritis, Esophagitis with recurrent GIB, CHF, L BKA, Decubitus Ulcers, Hx of CVA, COVID-19 positive 4/2020 presenting to the ED from HD clinic for tachycardia. Patient did not undergo HD today. Recently discharged from [facility] to [facility] 2 days ago after admission for culture negative endocarditis and UTI. Patient non-verbal which is present at his baseline. Nursing home unable to provide further history.     DDx broad and includes but not limited to sepsis, bacteremia, UTI, endocarditis, metabolic encephalopathy, pneumonia, viral syndrome. Rectal temp 102.2 on arrival. Will give empirical Vancomycin and Zosyn. Will give gentle IVF given ESRD status.     Work-up significant for sepsis  with PNA as most likely etiology given CXR findings. HR gradually improved with IVF. Discussed with HM, will admit for further management. I have discussed the care of this patient with my supervising physician.            Attending Attestation:     Physician Attestation Statement for NP/PA:   I discussed this assessment and plan of this patient with the NP/PA, but I did not personally examine the patient. The face to face encounter was performed by the NP/PA.                  ED Course as of Aug 05 1021   Wed Aug 05, 2020   1018 POC PH: 7.439 [BA]   1018 WBC(!): 14.20 [BA]   1018 Hemoglobin(!): 8.5 [BA]   1018 Hematocrit(!): 27.6 [BA]   1018 Lactic acid, plasma #1 [BA]   1019 Lactate, Aydin: 1.0 [BA]   1019 Procalcitonin(!): 3.52 [BA]   1019 BNP(!): 1,748 [BA]   1020 CXR shows new bilateral reticulonodular opacities suggestive of infectious/inflammatory process including pneumonia or aspiration in the setting of sepsis.    [BA]   1020 Potassium: 4.9 [BA]   1020 Creatinine(!): 2.6 [BA]   1020 BUN, Bld(!): 51 [BA]      ED Course User Index  [BA] Vishal Phillips PA-C                Clinical Impression:       ICD-10-CM ICD-9-CM   1. Sepsis, due to unspecified organism, unspecified whether acute organ dysfunction present  A41.9 038.9     995.91   2. Tachycardia  R00.0 785.0   3. Pneumonia of both lungs due to infectious organism, unspecified part of lung  J18.9 483.8   4. ESRD (end stage renal disease) on dialysis  N18.6 585.6    Z99.2 V45.11         Disposition:   Disposition: Admitted  Condition: Serious

## 2020-08-05 NOTE — PROGRESS NOTES
Pharmacokinetic Initial Assessment: IV Vancomycin    Assessment/Plan:    Initiate intravenous vancomycin with loading dose of 1000 mg once with subsequent doses when random concentrations are less than 20 mcg/mL  Desired empiric serum trough concentration is 10 to 20 mcg/mL  Draw vancomycin random level on 08/06/20 at 0400.  Pharmacy will continue to follow and monitor vancomycin.      Please contact pharmacy at extension [phone] with any questions regarding this assessment.     Thank you for the consult,   Concepcion Darby       Patient brief summary:  Vaughn Retana is a 55 y.o. male initiated on antimicrobial therapy with IV Vancomycin for treatment of suspected bacteremia    Drug Allergies:   Review of patient's allergies indicates:   Allergen Reactions    Fosrenol [lanthanum] Nausea And Vomiting     Nausea and vomiting       Actual Body Weight:   52.2 kg    Renal Function:   Estimated Creatinine Clearance: 23.4 mL/min (A) (based on SCr of 2.6 mg/dL (H)).,     Dialysis Method (if applicable):  intermittent HD    CBC (last 72 hours):  Recent Labs   Lab Result Units 08/05/20  0820   WBC K/uL 14.20*   Hemoglobin g/dL 8.5*   Hematocrit % 27.6*   Platelets K/uL 301   Gran% % 82.9*   Lymph% % 6.2*   Mono% % 7.6   Eosinophil% % 2.4   Basophil% % 0.3   Differential Method  Automated       Metabolic Panel (last 72 hours):  Recent Labs   Lab Result Units 08/05/20  0820   Sodium mmol/L 135*   Potassium mmol/L 4.9   Chloride mmol/L 101   CO2 mmol/L 24   Glucose mg/dL 146*   BUN, Bld mg/dL 51*   Creatinine mg/dL 2.6*   Albumin g/dL 1.3*   Total Bilirubin mg/dL 0.4   Alkaline Phosphatase U/L 151*   AST U/L 40   ALT U/L 9*   Magnesium mg/dL 1.7   Phosphorus mg/dL 3.5       Drug levels (last 3 results):  No results for input(s): VANCOMYCINRA, VANCOMYCINPE, VANCOMYCINTR in the last 72 hours.    Microbiologic Results:  Microbiology Results (last 7 days)     Procedure Component Value Units Date/Time    Culture, Respiratory  with Gram Stain [[id number]]     Order Status: No result Specimen: Sputum, Expectorated     Blood culture x two cultures. Draw prior to antibiotics. [[id number]] Collected: 08/05/20 0910    Order Status: Sent Specimen: Blood from Peripheral, Hand, Right Updated: 08/05/20 0916    Blood culture x two cultures. Draw prior to antibiotics. [[id number]] Collected: 08/05/20 0820    Order Status: Sent Specimen: Blood from Line, Subclavian, Left Updated: 08/05/20 0845

## 2020-08-05 NOTE — ED TRIAGE NOTES
Pt arrived from [facility] by EMS   EMS states pt is a resident of [facility] and was at [facility] for his dialysis  Pt had a heart rate in 130's and temp of 100.2

## 2020-08-05 NOTE — ED NOTES
Pt sleeping, awakens to verbal stimuli at baseline non-verbal, RR even and unlabored w/ no distress noted. Skin warm and dry.

## 2020-08-05 NOTE — ED NOTES
Patient cleansed and repositioned in bed with new chux pads placed. Turned on his right side with a foam wedge. Foam wedge also placed between patient's legs. Continuous pulse oximetry, BP, and cardiac monitoring in place. Call bell within reach. Will continue to monitor.

## 2020-08-05 NOTE — ED NOTES
Pt placed on tele box at this time for continuous telemetry monitoring and continuous pulse ox at this time. Seizure precautions remain initiated. Bed in lowest, locked position with side rails up x 2. Call light within reach of pt.

## 2020-08-05 NOTE — H&P
Hospital Medicine  History and Physical Exam     [place] [facility] [place] Mariposa Dalton MD  Admit Date: 8/5/2020  JUAN   Principal Problem:  Hospital Acquired pneumonia  Patient information was obtained from patient and ER records.   Primary Care Physician: Cori Randall MD  Code status: Full Code    HPI: Vaughn Retana is a 54 year old male with extensive medical history including ESRD on dialysis MWF (has not received it today), L below knee amputation 2/2 osteomyelitis, dCHF (last echo 8/2/19 [facility] [place]), GERD, h/o multiple ICH w/o residual deficits, decubitus ulcers and multiple instances of GI bleed (last EGD 11/12/19, esophagitis) nonverbal at baseline COVID-19 positive on 03/25/2020 who presents to the ED from HD clinic with chief complaint of tachycardia.  Patient did not undergo his dialysis today because he was noted to be tachycardic.  EMS was called and he was sent to the ED.  Patient was recently admitted to the hospital for culture negative endocarditis and UTI.  He was then discharged to [facility] for ongoing care and antibiotics.  He was discharged from the LTAC 2 days ago and went back to [facility] yesterday.      While in the ED, patient noted to be tachycardic with HR in 130s, febrile with T-max of 102.2°, saturating 98% on room air.  Labs reveal leukocytosis with WBC of 14.2.  BUN/creatinine of 51/2.6.  BNP elevated to 1748.  Troponin elevated to 0.168.  Procalcitonin elevated to 3.5 to.  Lactate WNL.  Patient initially tested positive for COVID on 03/25/2020, since he is 120 days away from his 1st positive test, he was retested and COVID-19 test this time was negative.  CXR showing New bilateral reticulonodular opacities suggestive of infectious/inflammatory process including pneumonia or aspiration in the setting of sepsis.  Patient started on vancomycin, Zosyn and admitted to Hospital Medicine for further management.    Past Medical History: Patient has a past medical history  of Amputation stump pain (9/10/2013), Aspiration pneumonia (7/27/2015), Asterixis (11/8/2016), C. difficile colitis (8/7/2015), Cholelithiasis without obstruction (8/25/2015), Chronic diastolic heart failure, Chronic low back pain (12/1/2015), Closed head injury (9/8/2016), COVID-19 virus infection (3/28/2020), DVT (deep venous thrombosis) (7/28/2017), ESRD on hemodialysis (2/7/2013), GERD (gastroesophageal reflux disease), HCV antibody positive, Hemiparesis affecting left side as late effect of stroke (11/08/2016), History of Intracerebral Hemorrhage: L BG 5/2013; R BG 9/2016; R BG 11/2016; L caudate head 2/2017 (11/2/2016), Hypertension, left basal ganglia ICH 5/2013 (11/2/2016), Left Caudate Head ICH 2/22/2017 (2/24/2017), 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    Past Surgical History: Patient has a past surgical history that includes R AVF (9/12/12); Leg amputation through knee (12/18/2013); Foot amputation through metatarsal; Colonoscopy; Colonoscopy (N/A, 4/4/2017); Esophagogastroduodenoscopy (N/A, 6/12/2018); Upper gastrointestinal endoscopy; Esophagogastroduodenoscopy (N/A, 3/7/2019); Esophagogastroduodenoscopy (N/A, 9/23/2019); Esophagogastroduodenoscopy (N/A, 10/2/2019); Esophagogastroduodenoscopy (N/A, 11/12/2019); Esophagogastroduodenoscopy (N/A, 2/14/2020); Fistulogram (Right, 4/28/2020); and Declotting of vascular graft (N/A, 4/28/2020).    Social History: Patient reports that he has quit smoking. He has a 10.00 pack-year smoking history. He has never used smokeless tobacco. He reports that he does not drink alcohol or use drugs.    Family History:  family history includes Alcohol abuse in his maternal grandmother; Diabetes in his brother and maternal grandfather; Early death in his mother; Heart disease in his father; Hyperlipidemia in his father; Hypertension in his father and sister; Kidney disease in his father.    Medications: reviewed     Allergies: Patient is allergic to fosrenol [lanthanum].    Review of Systems: (BOLDED POSITIVE) (REMAINDER NEGATIVE)     Patient is nonverbal, unable to obtain review of systems I         Physical Exam:     Temp:  [99 °F (37.2 °C)-102.2 °F (39 °C)]   Pulse:  [115-134]   Resp:  [0-20]   BP: (111-139)/(69-84)   SpO2:  [96 %-99 %]   Body mass index is 18.56 kg/m².     Intake/Output Summary (Last 24 hours) at 8/5/2020 1436  Last data filed at 8/5/2020 1158  Gross per 24 hour   Intake 1100 ml   Output --   Net 1100 ml          General appearance: NAD, eyes open, tracking, appears stated age, diaphoretic    Head: Normocephalic, without obvious abnormality, atraumatic     Eyes: conjunctivae/corneas clear. PERRLA, EOM's intact     Throat: Lips, mucosa, and tongue moist and without lesion     Neck: supple, trachea midline, no adenopathy     Lungs: clear to auscultation bilaterally, no wheezes, no crackles, no rales, no rhonchi   Heart: regular rate and rhythm, S1, S2 normal, no murmur, click, rub or gallop    Abdomen: Soft, non-tender, non-distended, normoactive bowel sounds. No masses, no organomegaly     Extremities: Left BKA.  Right leg contractures     Neurologic: Alert and oriented X 0, unable to answer questions or follow commands.    Musculoskeletal: Normal ROM, normal strength     Skin: No rash, tears, or ecchymosis, capillary refill brisk       Labs:  Recent Results (from the past 24 hour(s))   CBC auto differential    Collection Time: 08/05/20  8:20 AM   Result Value Ref Range    WBC 14.20 (H) 3.90 - 12.70 K/uL    RBC 3.05 (L) 4.60 - 6.20 M/uL    Hemoglobin 8.5 (L) 14.0 - 18.0 g/dL    Hematocrit 27.6 (L) 40.0 - 54.0 %     Mean Corpuscular Volume 91 82 - 98 fL    Mean Corpuscular Hemoglobin 27.9 27.0 - 31.0 pg    Mean Corpuscular Hemoglobin Conc 30.8 (L) 32.0 - 36.0 g/dL    RDW 16.1 (H) 11.5 - 14.5 %    Platelets 301 150 - 350 K/uL    MPV 11.0 9.2 - 12.9 fL    Immature Granulocytes 0.6 (H) 0.0 - 0.5 %    Gran # (ANC) 11.8 (H) 1.8 - 7.7 K/uL    Immature Grans (Abs) 0.08 (H) 0.00 - 0.04 K/uL    Lymph # 0.9 (L) 1.0 - 4.8 K/uL    Mono # 1.1 (H) 0.3 - 1.0 K/uL    Eos # 0.3 0.0 - 0.5 K/uL    Baso # 0.04 0.00 - 0.20 K/uL    nRBC 0 0 /100 WBC    Gran% 82.9 (H) 38.0 - 73.0 %    Lymph% 6.2 (L) 18.0 - 48.0 %    Mono% 7.6 4.0 - 15.0 %    Eosinophil% 2.4 0.0 - 8.0 %    Basophil% 0.3 0.0 - 1.9 %    Differential Method Automated    Comprehensive metabolic panel    Collection Time: 08/05/20  8:20 AM   Result Value Ref Range    Sodium 135 (L) 136 - 145 mmol/L    Potassium 4.9 3.5 - 5.1 mmol/L    Chloride 101 95 - 110 mmol/L    CO2 24 23 - 29 mmol/L    Glucose 146 (H) 70 - 110 mg/dL    BUN, Bld 51 (H) 6 - 20 mg/dL    Creatinine 2.6 (H) 0.5 - 1.4 mg/dL    Calcium 9.1 8.7 - 10.5 mg/dL    Total Protein 8.0 6.0 - 8.4 g/dL    Albumin 1.3 (L) 3.5 - 5.2 g/dL    Total Bilirubin 0.4 0.1 - 1.0 mg/dL    Alkaline Phosphatase 151 (H) 55 - 135 U/L    AST 40 10 - 40 U/L    ALT 9 (L) 10 - 44 U/L    Anion Gap 10 8 - 16 mmol/L    eGFR if African American 30.5 (A) >60 mL/min/1.73 m^2    eGFR if non  26.4 (A) >60 mL/min/1.73 m^2   Lactic acid, plasma #1    Collection Time: 08/05/20  8:20 AM   Result Value Ref Range    Lactate (Lactic Acid) 1.0 0.5 - 2.2 mmol/L   Magnesium    Collection Time: 08/05/20  8:20 AM   Result Value Ref Range    Magnesium 1.7 1.6 - 2.6 mg/dL   Phosphorus    Collection Time: 08/05/20  8:20 AM   Result Value Ref Range    Phosphorus 3.5 2.7 - 4.5 mg/dL   APTT    Collection Time: 08/05/20  8:20 AM   Result Value Ref Range    aPTT 41.7 (H) 21.0 - 32.0 sec   Protime-INR    Collection Time: 08/05/20  8:20 AM   Result Value Ref Range     Prothrombin Time 13.8 (H) 9.0 - 12.5 sec    INR 1.3 (H) 0.8 - 1.2   Procalcitonin    Collection Time: 08/05/20  8:20 AM   Result Value Ref Range    Procalcitonin 3.52 (H) <0.25 ng/mL   Troponin I    Collection Time: 08/05/20  8:20 AM   Result Value Ref Range    Troponin I 0.168 (H) 0.000 - 0.026 ng/mL   Brain natriuretic peptide    Collection Time: 08/05/20  8:20 AM   Result Value Ref Range    BNP 1,748 (H) 0 - 99 pg/mL   Type & Screen    Collection Time: 08/05/20  8:20 AM   Result Value Ref Range    Group & Rh A POS     Indirect Marci NEG    Ammonia    Collection Time: 08/05/20  8:20 AM   Result Value Ref Range    Ammonia 45 10 - 50 umol/L   ISTAT PROCEDURE    Collection Time: 08/05/20  9:37 AM   Result Value Ref Range    POC PH 7.439 7.35 - 7.45    POC PCO2 37.1 35 - 45 mmHg    POC PO2 31 (LL) 40 - 60 mmHg    POC HCO3 25.1 24 - 28 mmol/L    POC BE 1 -2 to 2 mmol/L    POC SATURATED O2 63 (L) 95 - 100 %    POC TCO2 26 24 - 29 mmol/L    Sample VENOUS     Site Other     Allens Test N/A    Lactic acid, plasma #2    Collection Time: 08/05/20 12:04 PM   Result Value Ref Range    Lactate (Lactic Acid) 1.2 0.5 - 2.2 mmol/L   COVID-19 Rapid Screening    Collection Time: 08/05/20 12:49 PM   Result Value Ref Range    SARS-CoV-2 RNA, Amplification, Qual Negative Negative       Hemoglobin A1C   Date Value Ref Range Status   05/01/2020 5.2 4.0 - 5.6 % Final     Comment:     ADA Screening Guidelines:  5.7-6.4%  Consistent with prediabetes  >or=6.5%  Consistent with diabetes  High levels of fetal hemoglobin interfere with the HbA1C  assay. Heterozygous hemoglobin variants (HbS, HgC, etc)do  not significantly interfere with this assay.   However, presence of multiple variants may affect accuracy.     02/06/2020 4.7 4.0 - 5.6 % Final     Comment:     ADA Screening Guidelines:  5.7-6.4%  Consistent with prediabetes  >or=6.5%  Consistent with diabetes  High levels of fetal hemoglobin interfere with the HbA1C  assay. Heterozygous  hemoglobin variants (HbS, HgC, etc)do  not significantly interfere with this assay.   However, presence of multiple variants may affect accuracy.     11/09/2019 4.0 4.0 - 5.6 % Final     Comment:     ADA Screening Guidelines:  5.7-6.4%  Consistent with prediabetes  >or=6.5%  Consistent with diabetes  High levels of fetal hemoglobin interfere with the HbA1C  assay. Heterozygous hemoglobin variants (HbS, HgC, etc)do  not significantly interfere with this assay.   However, presence of multiple variants may affect accuracy.         No results for input(s): POCTGLUCOSE in the last 168 hours.    Active Hospital Problems    Diagnosis  POA    Sepsis [A41.9]  Yes    End-stage renal disease on hemodialysis [N18.6, Z99.2]  Not Applicable     Chronic    Renovascular hypertension [I15.0]  Yes     Chronic    Chronic diastolic heart failure [I50.32]  Yes     Chronic     2-23-17    1 - Low normal to mildly depressed left ventricular systolic function (EF 50-55%).     2 - Right ventricular enlargement with mildly depressed systolic function.     3 - Left ventricular diastolic dysfunction.     4 - Right atrial enlargement.     5 - Severe tricuspid regurgitation.     6 - Pulmonary hypertension. The estimated PA systolic pressure is 86 mmHg.     7 - Increased central venous pressure.       History of Intracerebral Hemorrhage: L BG 5/2013; R BG 9/2016; R BG 11/2016; L caudate head 2/2017 [Z86.79]  Not Applicable     Chronic    History of left below knee amputation 12/18/13 [Z89.512]  Not Applicable     Chronic    Peripheral vascular disease in diabetes mellitus [E11.51]  Yes     Chronic    Dyslipidemia [E78.5]  Yes     Chronic      Resolved Hospital Problems   No resolved problems to display.       Assessment and Plan:    Sever Sepsis:   Hospital-acquired pneumonia:  -pt has leucocytosis with WBC count of 14, fever with temp of 102.2 F, tachycardia with HR of 130s  -Panculture: blood cx, induced sputum, UA with culture, (wound  cx), look for decubs  -CXR shows New bilateral reticulonodular opacities suggestive of infectious/inflammatory process including pneumonia or aspiration in the setting of sepsis.   -lactic acid wnl  -Antibiotics: broad spectrum coverage with vancomycin and Zosyn  -Telemetry, Vitals per unit protocol  -monitor I/Os to r/o oliguria, end organ damage, maintain UOP >0.5ml/kg/h    Elevated troponin:  -troponin of 0.168  -likely type 2 in the setting of sepsis  -EKG on admission shows no acute ST changes  -will continue to cycle troponin and continue until downtrend is noted  -Maintain K > 4, Mag > 2 and Ca WNL to decrease arrhythmogenic potential  -Monitor on telemetry      HD Associated Hypotension  -Chronic issue  -Continue Midodrine with HD MWF    History of ICH  -hx in 2013  -Resides at [facility]     ESRD on HD:  -Pt undergoes HD MWF outpatient  -Consult nephrology to continue HD inpatient   -Renally dose medications and avoid nephrotoxic medications to preserve residual renal function   -Strict I/O's and daily weights  -Continue to monitor renal function with daily labs      Chronic diastolic heart failure  -No signs of exacerbation, appears dry  -Continue to monitor on telemetry  -Monitor intake and output and obtain daily STANDING weights  -Fluid restriction to 1.5L per day and cardiac/renal diet with salt restriction  -Supplemental O2 to keep Spo2 >90%     Severe malnutrition:  -Novasource @ 45 mL/hr to provide 1440 kcals, 65 g of protein, 516 mL fluid.   -Continue TFs, oral nutrition as tolerated    Seizure Disorder  -Chronic and stable  -Continue Keppra 250mg BID  -Seizure precautions     ESRD on HD:  -consult Nephrology for ongoing HD while admitted inpatient  -did not receive dialysis on 08/05  -Continue to monitor renal function with daily labs   -Avoid nephrotoxins  -Renally dose all medications   -Monitor events that may lead to decreased renal perfusion (hypovolemia, hypotension, sepsis).   -Monitor  urine output to assure that no obstruction precipitates worsening in GFR.    Chronic Wounds:  -per nursing report, pt has stage IV sacral decubitus ulcer measuring 7 cm in length and 9.5 cm in width, Right lower leg, black and unstageable, right ankle stage three decubitus measuring 4 cm in length and 2 cm in width, right foot decubitus stage three measuring 2 cm in length and 3 cm in width, stage 3 decubitus to inner side right fourth toe near right fifth toe dressed with 2X2 dressing, right heel black and unstableable, right fifth toe unstable and black and tip of right fifth toe unstable and black Dressing noted to right buttock and hip, dressing noted to right knee and left stump  -consult wound care      Diabetes Mellitus:  -Last HbA1c:  4.7 on 06/22/2019  -SSI with accuchecks qACHS  -BG goal: Preprandial blood glucose target <140 mg/dL, Random glucoses <180 mg/dL  -ADA diet     Gastroparesis:  -continue Reglan     Hx GERD with esophagitis  -Continue Protonix    DVT PPx: scds    Mariposa Dalton MD  Hospital Medicine Staff  [phone] pager

## 2020-08-06 PROBLEM — Z93.1 GASTROSTOMY STATUS: Chronic | Status: ACTIVE | Noted: 2020-01-01

## 2020-08-06 PROBLEM — Z86.79: Status: ACTIVE | Noted: 2018-03-16

## 2020-08-06 PROBLEM — L89.224 PRESSURE INJURY OF LEFT HIP, STAGE 4: Status: ACTIVE | Noted: 2020-01-01

## 2020-08-06 NOTE — CONSULTS
Wound consult received on patient's multiple pressure injuries, present on Admission. See photodocumentation and descriptions below. See H&P for full history.    Recommendations:  Sizewise immerse surface, Turn every 2hrs, pad bony prominences with foams or pillows.  Left hip pressure injury, sacral pressure injury, and Left BKA ulceration: BID/prn clean with sterile normal saline, pack with quarter strength dakin's solution/gauze, cover with foam dressings.  Right knee pressure injuries, right foot pressure injuries, right hip pressure injury, and right upper back pressure injury, and right hand ulceration: BID paint with betadine, cover with foam dressing, right foot secure with kerlix.  Pending goals of care: would consider either general surgery or orthopedic surgery evaluation of left hip pressure injury.    Secure chat message sent to Dr. Hutchinson. Nursing to continue care. Wound care to follow prn.    Left Hip Unstageable pressure injury: full thickness skin loss, 100% non-viable tissue, moderate serosangineous drainage, no odor. 9cm x 5.5cm x 2cm with 4.5cm undermining at 9 oclock.  No bone palpable or visualized      Right Hand: Dry stable eschar, unknown etiology, 0.5cm x 0.5cm      Right anterior knee Unstageable pressure injury: yellow slough 0.3cm x 0.3cm x 0.1cm    Right medial Knee Unstageable Pressure injury: 98% yellow slough, pink wound edges, scant serous drainage, no odor 2cm x 2cm x0.1cm    Right lateral foot: multiple unstageable pressure injuries and two stage 3 pressure injuries. Stage 3 pressure injuries moist red wound beds with small amount of slough. unstageable pressure injuries to right lateral foot/ankle extending to 5th toe and over right heel with Dry stable eschar. Overall dimensions for right foot 13cm x 20cm.    Right heel unstageable pressure injury: 100% dry stable eschar    Right hip unstageable pressure injury: overall 12cm x 10cm , pink epithelial tissue, 20-30% yellow slough,  no drainage or odor.    Sacral Stage 4 pressure injury: 10cm x 8cm x 1cm full thickness skin loss, moist red wound bed, adipose and muscle exposed. No bone noted. Small amount of fibrinous tissue, yellowish/creamy drainage, no odor.    Right lateral back unstageable pressure injury: 0.4cm x 0.4cm yellow slough, no drainage or odor.    Left BKA ulceration: full thickness skin loss, 5cm x 4cm x 1cm yellow slough to wound base, red wound edges, no bone noted. No drainage or odor noted.

## 2020-08-06 NOTE — ASSESSMENT & PLAN NOTE
History of renal hypertension  No longer on antihypertensive medications. Continue home midodrine as needed.

## 2020-08-06 NOTE — CONSULTS
[facility]  Nephrology  Consult Note    Patient Name: Vaughn Retana  MRN: [id number]  Admission Date: 8/5/2020  Hospital Length of Stay: 1 days  Attending Provider: Jono Hutchinson MD   Primary Care Physician: Cori Randall MD  Principal Problem:Sepsis due to pneumonia    Inpatient consult to Nephrology  Consult performed by: Ray Ortiz NP  Consult ordered by: Mariposa Dalton MD  Reason for consult: ESRD on HD        Subjective:     HPI: Mr Retana is a 54 year old male with extensive medical history including ESRD on dialysis (MWF), L below knee amputation 2/2 osteomyelitis, [place] [facility] [place]D, h/o multiple ICH w/o residual deficits, decubitus ulcers and multiple instances of GI bleed (last EGD 11/12/19, esophagitis), nonverbal at baseline, and COVID-19 positive on 03/25/2020 (now COVID-19 negative) who presented to the ED from HD clinic on 8/5/2020 with chief complaint of tachycardia. Pt did not receive HD on 8/5/2020 2/2 concern for tachycardia in his outpatient HD unit. Patient was recently admitted to the hospital for culture negative endocarditis and UTI. [facility] and antibiotics. While in the ED, patient noted to be tachycardic with HR in 130s, febrile with T-max of 102.2°, saturating 98% on room air. Labs reveal leukocytosis with WBC of 14.2.  BUN/creatinine of 51/2.6.  BNP elevated to 1748.  Troponin elevated to 0.168.  Procalcitonin elevated to 3.5 to. Patient initially tested positive for COVID on 03/25/2020, since he is 120 days away from his 1st positive test, he was retested and COVID-19 test this time was negative. CXR showing New bilateral reticulonodular opacities suggestive of infectious/inflammatory process including pneumonia or aspiration in the setting of sepsis. Patient started on vancomycin, Zosyn and admitted to Hospital Medicine for further management of sepsis. Nephrology has been consulted  for management of ESRD and HD while in-patient.     -HPI was obtained from review of the patient's EMR. Pt is nonverbal at baseline and unable to provide HPI.        Past Medical History:   Diagnosis Date    Amputation stump pain 9/10/2013    Aspiration pneumonia 7/27/2015    Asterixis 11/8/2016    C. difficile colitis 8/7/2015    Cholelithiasis without obstruction 8/25/2015    Chronic diastolic heart failure     2-23-17   1 - Low normal to mildly depressed left ventricular systolic function (EF 50-55%).    2 - Right ventricular enlargement with mildly depressed systolic function.    3 - Left ventricular diastolic dysfunction.    4 - Right atrial enlargement.    5 - Severe tricuspid regurgitation.    6 - Pulmonary hypertension. The estimated PA systolic pressure is 86 mmHg.    7 - Increased central venous pressure.     Chronic low back pain 12/1/2015    Closed head injury 9/8/2016    COVID-19 virus infection 3/28/2020    DVT (deep venous thrombosis) 7/28/2017    ESRD on hemodialysis 2/7/2013    MWF at [facility]   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    Metabolic acidosis, IAG, reduced excretion of inorganic acids     Myoclonic jerking 9/20/2016    Noncompliance with medication regimen 12/4/2018    Secondary hyperparathyroidism (of renal origin)     Secondary pulmonary hypertension 3/23/2017    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    TB lung, latent 08/25/2015    Negative Quantiferon Gold 3-23-17       Past Surgical History:   Procedure Laterality Date    COLONOSCOPY       COLONOSCOPY N/A 4/4/2017    Procedure: COLONOSCOPY;  Surgeon: Walker Stern MD;  Location: [facility] ENDO (2ND FLR);  Service: Endoscopy;  Laterality: N/A;  PA Systolic Pressure 85.56. HD Patient MWF, K+ lab prior to procedure.     DECLOTTING OF VASCULAR GRAFT N/A 4/28/2020    Procedure: Declot, Vascular Graft;  Surgeon: SHEA Alfonso III, MD;  Location: [facility] CATH LAB;  Service: Peripheral Vascular;  Laterality: N/A;    ESOPHAGOGASTRODUODENOSCOPY N/A 6/12/2018    Procedure: EGD (ESOPHAGOGASTRODUODENOSCOPY);  Surgeon: Man Galicia MD;  Location: [facility] (2ND FLR);  Service: Endoscopy;  Laterality: N/A;  EGD in 8-12 weeks with Dr. Galicia on 4th floor for follow up erosive esophagitis and Mejia's surveillance.    Patient should be on Pantoprazole 40mg every 12 hours or the equivulant of another PPI    awaiting for patient to reply back regarding changing    ESOPHAGOGASTRODUODENOSCOPY N/A 3/7/2019    Procedure: EGD (ESOPHAGOGASTRODUODENOSCOPY);  Surgeon: Man Galicia MD;  Location: [facility] (2ND FLR);  Service: Endoscopy;  Laterality: N/A;    ESOPHAGOGASTRODUODENOSCOPY N/A 9/23/2019    Procedure: EGD (ESOPHAGOGASTRODUODENOSCOPY);  Surgeon: Keanu Rainey MD;  Location: [facility] (2ND FLR);  Service: Endoscopy;  Laterality: N/A;    ESOPHAGOGASTRODUODENOSCOPY N/A 10/2/2019    Procedure: EGD (ESOPHAGOGASTRODUODENOSCOPY);  Surgeon: Kevin De La Paz MD;  Location: [facility] (2ND FLR);  Service: Endoscopy;  Laterality: N/A;    ESOPHAGOGASTRODUODENOSCOPY N/A 11/12/2019    Procedure: EGD (ESOPHAGOGASTRODUODENOSCOPY);  Surgeon: Kevin De La Paz MD;  Location: [facility] (2ND FLR);  Service: Endoscopy;  Laterality: N/A;    ESOPHAGOGASTRODUODENOSCOPY N/A 2/14/2020    Procedure: EGD (ESOPHAGOGASTRODUODENOSCOPY);  Surgeon: Kevin De La Paz MD;  Location: [facility] ([address] [place] [address]);  Service: Endoscopy;  Laterality: N/A;    FISTULOGRAM Right 4/28/2020    Procedure: Fistulogram;  Surgeon: SHEA Alfonso III, MD;  Location:  [facility] CATH LAB;  Service: Peripheral Vascular;  Laterality: Right;    FOOT AMPUTATION THROUGH METATARSAL      left foot    LEG AMPUTATION THROUGH KNEE  12/18/2013    left BKA    R AVF  9/12/12    UPPER GASTROINTESTINAL ENDOSCOPY         Review of patient's allergies indicates:   Allergen Reactions    Lanthanum Nausea And Vomiting     Nausea and vomiting  Nausea and vomiting     Current Facility-Administered Medications   Medication Frequency    0.9%  NaCl infusion Once    acetaminophen tablet 650 mg Q4H PRN    albuterol-ipratropium 2.5 mg-0.5 mg/3 mL nebulizer solution 3 mL Q4H PRN    bisacodyL suppository 10 mg Daily PRN    dextrose 50% injection 12.5 g PRN    dextrose 50% injection 25 g PRN    glucagon (human recombinant) injection 1 mg PRN    glucose chewable tablet 16 g PRN    glucose chewable tablet 24 g PRN    HYDROcodone-acetaminophen 5-325 mg per tablet 1 tablet Q6H PRN    insulin aspart U-100 pen 0-5 Units QID (AC + HS) PRN    levetiracetam oral soln Soln 250 mg BID    metoclopramide HCl tablet 5 mg QID    midodrine tablet 5 mg PRN    ondansetron injection 4 mg Q8H PRN    pantoprazole suspension 40 mg BID    piperacillin-tazobactam 4.5 g in sodium chloride 0.9% 100 mL IVPB (ready to mix system) Q12H    prochlorperazine injection Soln 5 mg Q6H PRN    senna-docusate 8.6-50 mg per tablet 1 tablet BID    sodium chloride 0.9% bolus 500 mL Once    sodium chloride 0.9% flush 10 mL PRN    vancomycin - pharmacy to dose pharmacy to manage frequency     Family History     Problem Relation (Age of Onset)    Alcohol abuse Maternal Grandmother    Diabetes Brother, Maternal Grandfather    Early death Mother    Heart disease Father    Hyperlipidemia Father    Hypertension Father, Sister    Kidney disease Father        Tobacco Use    Smoking status: Former Smoker     Packs/day: 1.00     Years: 10.00     Pack years: 10.00    Smokeless tobacco: Never Used   Substance and Sexual Activity    Alcohol  use: No    Drug use: No    Sexual activity: Never     Partners: Female     Birth control/protection: None     Review of Systems   Unable to perform ROS: Other (nonverbal )     Objective:     Vital Signs (Most Recent):  Temp: 99 °F (37.2 °C) (08/06/20 1139)  Pulse: (!) 127 (08/06/20 1139)  Resp: 16 (08/06/20 1139)  BP: 133/80 (08/06/20 1139)  SpO2: 96 % (08/06/20 1139)  O2 Device (Oxygen Therapy): room air (08/06/20 0357) Vital Signs (24h Range):  Temp:  [97 °F (36.1 °C)-99 °F (37.2 °C)] 99 °F (37.2 °C)  Pulse:  [] 127  Resp:  [14-24] 16  SpO2:  [94 %-100 %] 96 %  BP: ()/(56-83) 133/80     Weight: 57.5 kg (126 lb 12.2 oz) (08/06/20 0100)  Body mass index is 20.46 kg/m².  Body surface area is 1.64 meters squared.    I/O last 3 completed shifts:  In: 1100 [I.V.:350; IV Piggyback:750]  Out: -     Physical Exam  Constitutional:       General: He is not in acute distress.     Appearance: He is ill-appearing.   HENT:      Head: Normocephalic and atraumatic.      Nose: Nose normal.      Mouth/Throat:      Mouth: Mucous membranes are moist.      Pharynx: Oropharynx is clear.   Eyes:      Conjunctiva/sclera: Conjunctivae normal.      Pupils: Pupils are equal, round, and reactive to light.   Neck:      Musculoskeletal: Neck supple.   Cardiovascular:      Rate and Rhythm: Regular rhythm. Tachycardia present.   Pulmonary:      Effort: Pulmonary effort is normal.      Breath sounds: Rhonchi present.   Abdominal:      General: Abdomen is flat. Bowel sounds are normal. There is no distension.      Palpations: Abdomen is soft.      Comments: PEG in place    Musculoskeletal:      Right lower leg: No edema.      Left lower leg: No edema.      Comments: Pt's extremities contracted; pt noted to be in fetal position and laying on side   Skin:     Comments: Multiple decubitus wounds noted to bony prominences and sacrum. RUE AVF with +thrill and +bruit      Neurological:      Mental Status: He is alert.      Comments:  Nonverbal and noninteractive          Significant Labs:  CBC:   Recent Labs   Lab 08/06/20  0515   WBC 13.07*   RBC 2.97*   HGB 8.4*   HCT 28.0*      MCV 94   MCH 28.3   MCHC 30.0*     CMP:   Recent Labs   Lab 08/06/20  0515   *   CALCIUM 9.6   ALBUMIN 1.2*   PROT 7.7      K 5.1   CO2 20*      BUN 61*   CREATININE 3.1*   ALKPHOS 143*   ALT 6*   AST 35   BILITOT 0.5     All labs within the past 24 hours have been reviewed.        Assessment/Plan:     * Sepsis due to pneumonia  -Management per primary team     End-stage renal disease on hemodialysis  At time of consult: Mr Retana is an ESRD pt who presents at the direction of his outpatient HD unit. Pt not dialyzed on Wednesday 2/2 tachycardia. [place] [facility] [place], found to be septic. Pt complex medical history and multiple comorbidities, as well as being nonverbal and contracted. Pt with PNA 2/2 recent COVID-19 infection. Pt also with multiple decubiti. Pt remains tachycardiac upon nehpro consultation; sinus tach in 130's. Primary team with plans to administer 500cc NS bolus to help stabilize pt. Pt on room air and not in respiratory distress, although rhonchi were appreciated on auscultation throughout lung fields. CXR with evidence of PNA as well. Pt will need close monitoring. Suspect pt has high potential for acute decompensation.      Outpatient HD Information:    -Outpatient HD unit: [facility]   -Nephrologist: MD Mariah   -HD tx days: MWF  -HD tx time: 3hrs 45mins   -Last HD tx: ?  -HD access: RUE AVF   -HD modality: iHD   -Residual urine: Anuric   -EDW: 52.5kg     Inpatient HD Plan:    -Will hold iHD today 2/2 tachycardia/no urgent indication for HD today. Will closely monitory K+ and volume status. Will reassess tomorrow morning for HD once pt has had more time to stabilize.   -Primary team to give 500cc of NS for tachycardia   -Strict I/O's and daily weights  -Daily renal function panels   -Maintain Hgb >7.0  -Keep MAP  >65  -Will continue to follow while inpatient           Thank you for your consult. I will follow-up with patient. Please contact us if you have any additional questions.    Ray Ortiz NP  Nephrology  [facility]

## 2020-08-06 NOTE — PLAN OF CARE
Problem: Wound  Goal: Optimal Wound Healing  Outcome: Ongoing, Progressing  Intervention: Promote Effective Wound Healing  Flowsheets (Taken 8/6/2020 0550)  Pain Management Interventions:   position adjusted   pillow support provided     Problem: Fall Injury Risk  Goal: Absence of Fall and Fall-Related Injury  Intervention: Identify and Manage Contributors to Fall Injury Risk  Flowsheets (Taken 8/6/2020 0550)  Medication Review/Management: medications reviewed  Intervention: Promote Injury-Free Environment  Flowsheets (Taken 8/6/2020 0550)  Safety Promotion/Fall Prevention:   assistive device/personal item within reach   bed alarm set   side rails raised x 2     Problem: Adult Inpatient Plan of Care  Goal: Optimal Comfort and Wellbeing  Outcome: Ongoing, Progressing   PT arrived to room 625 @ appx 2041. HE is noted to be very confused and unable to make wants and needs known when he arrived but is doing much better this am with conveying some of his wants. HE was noted to be very contracted with multiple wounds in various locations and stages. The worse of his wounds are on his coccyx area and his left hip. The both are noted to be unstageable with underminning and tunneling. He has a wound to his left stump and to his right knee as well as to right heel. HE is noted to have a peg tube that is patent to aspiration and auscultation.  Bs are active x 4 quads. Bed is low and locked with call light with in easy reach.

## 2020-08-06 NOTE — HPI
Mr Retana is a 54 year old male with extensive medical history including ESRD on dialysis (MWF), L below knee amputation 2/2 osteomyelitis, [place] [facility] [place], h/o multiple ICH w/o residual deficits, decubitus ulcers and multiple instances of GI bleed (last EGD 11/12/19, esophagitis), nonverbal at baseline, and COVID-19 positive on 03/25/2020 (now COVID-19 negative) who presented to the ED from HD clinic on 8/5/2020 with chief complaint of tachycardia. Pt did not receive HD on 8/5/2020 2/2 concern for tachycardia in his outpatient HD unit. Patient was recently admitted to the hospital for culture negative endocarditis and UTI. [facility] and [facility]. While in the ED, patient noted to be tachycardic with HR in 130s, febrile with T-max of 102.2°, saturating 98% on room air. Labs reveal leukocytosis with WBC of 14.2.  BUN/creatinine of 51/2.6.  BNP elevated to 1748.  Troponin elevated to 0.168.  Procalcitonin elevated to 3.5 to. Patient initially tested positive for COVID on 03/25/2020, since he is 120 days away from his 1st positive test, he was retested and COVID-19 test this time was negative. CXR showing New bilateral reticulonodular opacities suggestive of infectious/inflammatory process including pneumonia or aspiration in the setting of sepsis. Patient started on vancomycin, Zosyn and admitted to Hospital Medicine for further management of sepsis. Nephrology has been consulted for management of ESRD and HD while in-patient.     -HPI was obtained from review of the patient's EMR. Pt is nonverbal at baseline and unable to provide HPI.

## 2020-08-06 NOTE — ASSESSMENT & PLAN NOTE
At time of consult: Mr Retana is an ESRD pt who presents at the direction of his outpatient HD unit. Pt not dialyzed on Wednesday 2/2 tachycardia. [place] [facility] [place] to [place]. Pt complex medical history and multiple comorbidities, as well as being nonverbal and contracted. Pt with PNA 2/2 recent COVID-19 infection. Pt also with multiple decubiti. Pt remains tachycardiac upon nehpro consultation; sinus tach in 130's. Primary team with plans to administer 500cc NS bolus to help stabilize pt. Pt on room air and not in respiratory distress, although rhonchi were appreciated on auscultation throughout lung fields. CXR with evidence of PNA as well. Pt will need close monitoring. Suspect pt has high potential for acute decompensation.      Outpatient HD Information:    -Outpatient HD [facility]   -Nephrologist: MD Mariah   -HD tx days: MWF  -HD tx time: 3hrs 45mins   -Last HD tx: ?  -HD access: AVF   -HD modality: iHD   -Residual urine: Anuric   -EDW: 52.5kg     Inpatient HD Plan:    -Will hold iHD today 2/2 tachycardia/no urgent indication for HD today. Will closely monitory K+ and volume status. Will reassess tomorrow morning for HD once pt has had more time to stabilize.   -Primary team to give 500cc of NS for tachycardia   -Strict I/O's and daily weights  -Daily renal function panels   -Maintain Hgb >7.0  -Keep MAP >65  -Will continue to follow while inpatient

## 2020-08-06 NOTE — ASSESSMENT & PLAN NOTE
Giving piperacillin-tazobactam and vancomycin. Wound Care consulted to evaluate wounds to make sure they are not contributing to sepsis.

## 2020-08-06 NOTE — PROGRESS NOTES
"[facility]  Adult Nutrition  Progress Note    SUMMARY       Recommendations    Recommendation:   1. Current TF exceeding needs, suggest decreasing TF to Novasource renal to goal rate of 35 mL/hr to provide pt with 1680 kcal, 76 g protein and 602 mL free water.    -Additional water per MD. Hold for residuals >500 mL.   2. Continue renal diet, encourage good PO intake at meals   3. Add novasource renal ONS to increase intake   4. Suggest MVI   RD to monitor and follow up    Goals: pt to tolerate >85% of EEN/EPN by RD follow up  Nutrition Goal Status: new  Communication of RD Recs: other (comment)(POC)    Reason for Assessment    Reason For Assessment: new tube feeding  Diagnosis: (sepsis due to pneumonia)  Relevant Medical History: HTN; CHF; pneumonia; ESRD on HD; GERD; DVT; L BKA  Interdisciplinary Rounds: did not attend  General Information Comments: Unable to see pt on multiple attempts. Pt eating bfst at one visit, PO <25% of meal eaten at this time. Unsure of intake PTA or recent wt loss, pt nonverbal. TF started, Novasource @ 45 mL/hr, goal rate. Pt wt increased per chart over past few months, UBW ~115-120 lbs over past year with some fluctuation. Unable to assess NFPE at this time, will monitor.  Nutrition Discharge Planning: adequate PO intake and TF meeting EEN/EPN    Nutrition Risk Screen    Nutrition Risk Screen: tube feeding or parenteral nutrition    Nutrition/Diet History    Food Allergies: NKFA  Factors Affecting Nutritional Intake: impaired cognitive status/motor control, difficulty/impaired swallowing, decreased appetite    Anthropometrics    Temp: 99 °F (37.2 °C)  Height Method: Estimated  Height: 5' 6" (167.6 cm)  Height (inches): 66 in  Weight Method: Bed Scale  Weight: 57.5 kg (126 lb 12.2 oz)  Weight (lb): 126.77 lb  Ideal Body Weight (IBW), Male: 142 lb  % Ideal Body Weight, Male (lb): 89.27 %  BMI (Calculated): 20.5  BMI Grade: 18.5-24.9 - normal   "     Lab/Procedures/Meds    Pertinent Labs Reviewed: reviewed  Pertinent Labs Comments: Na 135; BUN 51; Cr 2.6; Glucose 146  Pertinent Medications Reviewed: reviewed  Pertinent Medications Comments: pantoprazole; senna-docusate    Estimated/Assessed Needs    Weight Used For Calorie Calculations: 57.5 kg (126 lb 12.2 oz)  Energy Calorie Requirements (kcal): 7566-8978 kcal/d  Energy Need Method: Kcal/kg  Protein Requirements: 57-74 g/day(1.0-1.3 g/kg)  Weight Used For Protein Calculations: 57.5 kg (126 lb 12.2 oz)  Fluid Requirements (mL): 1 mL/kcal or per MD  RDA Method (mL): 1437    Nutrition Prescription Ordered    Current Diet Order: Renal diet  Current Nutrition Support Formula Ordered: NovasLiveHotSpot Renal  Current Nutrition Support Rate Ordered: 45 (ml)  Current Nutrition Support Frequency Ordered: mL/hr    Evaluation of Received Nutrient/Fluid Intake    Enteral Calories (kcal): 2160  Enteral Protein (gm): 98  Enteral (Free Water) Fluid (mL): 774  % Kcal Needs: 150  % Protein Needs: 170  Energy Calories Required: exceeds needs  Protein Required: exceeds needs  Fluid Required: meeting needs  Comments: LBM 8/5  Tolerance: tolerating  % Intake of Estimated Energy Needs: 75 - 100 %  % Meal Intake: NPO    Nutrition Risk    Level of Risk/Frequency of Follow-up: low     Assessment and Plan    Nutrition Problem  inadequate oral intake    Related to (etiology):   Decreased PO     Signs and Symptoms (as evidenced by):   Pt with G tube due to PO <75% of meals      Interventions (treatment strategy):  Collaboration of care with other provider  Enteral nutrition    Nutrition Diagnosis Status:   New    Monitor and Evaluation    Food and Nutrient Intake: energy intake, enteral nutrition intake, food and beverage intake  Food and Nutrient Adminstration: diet order, enteral and parenteral nutrition administration  Knowledge/Beliefs/Attitudes: food and nutrition knowledge/skill  Anthropometric Measurements: weight, weight  change  Biochemical Data, Medical Tests and Procedures: electrolyte and renal panel, gastrointestinal profile, glucose/endocrine profile, inflammatory profile, lipid profile  Nutrition-Focused Physical Findings: overall appearance     Nutrition Follow-Up    RD Follow-up?: Yes

## 2020-08-06 NOTE — PLAN OF CARE
Recommendations    Recommendation:   1. Current TF exceeding needs, suggest decreasing TF to Novasource renal to goal rate of 35 mL/hr to provide pt with 1680 kcal, 76 g protein and 602 mL free water.    -Additional water per MD. Hold for residuals >500 mL.   2. Continue renal diet, encourage good PO intake at meals   3. Add novasource renal ONS to increase intake   4. Suggest MVI   RD to monitor and follow up    Goals: pt to tolerate >85% of EEN/EPN by RD follow up  Nutrition Goal Status: new  Communication of RD Recs: other (comment)(POC)

## 2020-08-06 NOTE — HPI
Vaughn Retana is a 55 y.o. male with extensive medical history including ESRD on dialysis MWF (has not received it today), L below knee amputation 2/2 osteomyelitis with Dr. Houston in 2013, dCHF (last [place] 8/2/19 [facility] [place] DM last A1c 4.7GERD, h/o multiple ICH (2013, 2016 x2, 2017) w/o left side residual deficits, decubitus ulcers and multiple instances of GI bleed (last EGD 11/12/19, esophagitis) nonverbal at baseline COVID-19 positive on 03/25/2020 who presents to the ED from HD clinic with chief complaint of tachycardia.  Patient did not undergo his dialysis today because he was noted to be tachycardic.  EMS was called and he was sent to the ED.  Patient was recently admitted to the hospital for culture negative endocarditis and UTI.  [facility] and [facility].  He was discharged from the LTAC 2 days ago and went back to [facility] yesterday.        While in the ED, patient noted to be tachycardic with HR in 130s, febrile with T-max of 102.2°, saturating 98% on room air.  Labs reveal leukocytosis with WBC of 14.2.  BUN/creatinine of 51/2.6.  BNP elevated to 1748.  Troponin elevated to 0.168.  Procalcitonin elevated to 3.5 to.  Lactate WNL.  Patient initially tested positive for COVID on 03/25/2020, since he is 120 days away from his 1st positive test, he was retested and COVID-19 test this time was negative.  CXR showing New bilateral reticulonodular opacities suggestive of infectious/inflammatory process including pneumonia or aspiration in the setting of sepsis.  Patient started on vancomycin, Zosyn and admitted to Hospital Medicine for further management.

## 2020-08-06 NOTE — SUBJECTIVE & OBJECTIVE
Past Medical History:   Diagnosis Date    Amputation stump pain 9/10/2013    Aspiration pneumonia 7/27/2015    Asterixis 11/8/2016    C. difficile colitis 8/7/2015    Cholelithiasis without obstruction 8/25/2015    Chronic diastolic heart failure     2-23-17   1 - Low normal to mildly depressed left ventricular systolic function (EF 50-55%).    2 - Right ventricular enlargement with mildly depressed systolic function.    3 - Left ventricular diastolic dysfunction.    4 - Right atrial enlargement.    5 - Severe tricuspid regurgitation.    6 - Pulmonary hypertension. The estimated PA systolic pressure is 86 mmHg.    7 - Increased central venous pressure.     Chronic low back pain 12/1/2015    Closed head injury 9/8/2016    COVID-19 virus infection 3/28/2020    DVT (deep venous thrombosis) 7/28/2017    ESRD on hemodialysis 2/7/2013    MWF at [facility]   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    Metabolic acidosis, IAG, reduced excretion of inorganic acids     Myoclonic jerking 9/20/2016    Noncompliance with medication regimen 12/4/2018    Secondary hyperparathyroidism (of renal origin)     Secondary pulmonary hypertension 3/23/2017    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    TB lung, latent 08/25/2015    Negative Quantiferon Gold 3-23-17       Past Surgical History:   Procedure Laterality Date    COLONOSCOPY      COLONOSCOPY N/A 4/4/2017    Procedure: COLONOSCOPY;  Surgeon: Walker Stern MD;  Location: [facility] ([address] [place] [address]);  Service: Endoscopy;  Laterality: N/A;  PA Systolic  Pressure 85.56. HD Patient MWF, K+ lab prior to procedure.     DECLOTTING OF VASCULAR GRAFT N/A 4/28/2020    Procedure: Declot, Vascular Graft;  Surgeon: SHEA Alfonso III, MD;  Location: [facility] CATH LAB;  Service: Peripheral Vascular;  Laterality: N/A;    ESOPHAGOGASTRODUODENOSCOPY N/A 6/12/2018    Procedure: EGD (ESOPHAGOGASTRODUODENOSCOPY);  Surgeon: Man Galicia MD;  Location: [facility] (2ND FLR);  Service: Endoscopy;  Laterality: N/A;  EGD in 8-12 weeks with Dr. Galicia on 4th floor for follow up erosive esophagitis and Mejia's surveillance.    Patient should be on Pantoprazole 40mg every 12 hours or the equivulant of another PPI    awaiting for patient to reply back regarding changing    ESOPHAGOGASTRODUODENOSCOPY N/A 3/7/2019    Procedure: EGD (ESOPHAGOGASTRODUODENOSCOPY);  Surgeon: Man Galicia MD;  [facility]  Service: Endoscopy;  Laterality: N/A;    ESOPHAGOGASTRODUODENOSCOPY N/A 9/23/2019    Procedure: EGD (ESOPHAGOGASTRODUODENOSCOPY);  Surgeon: Keanu Rainey MD;  Location: [facility] (2ND FLR);  Service: Endoscopy;  Laterality: N/A;    ESOPHAGOGASTRODUODENOSCOPY N/A 10/2/2019    Procedure: EGD (ESOPHAGOGASTRODUODENOSCOPY);  Surgeon: Kevin De La Paz MD;  [facility]  Service: Endoscopy;  Laterality: N/A;    ESOPHAGOGASTRODUODENOSCOPY N/A 11/12/2019    Procedure: EGD (ESOPHAGOGASTRODUODENOSCOPY);  Surgeon: Kevin De La Paz MD;  Location: [facility] (2ND FLR);  Service: Endoscopy;  Laterality: N/A;    ESOPHAGOGASTRODUODENOSCOPY N/A 2/14/2020    Procedure: EGD (ESOPHAGOGASTRODUODENOSCOPY);  Surgeon: Kevin De La Paz MD;  Location: [facility] (2ND FLR);  Service: Endoscopy;  Laterality: N/A;    FISTULOGRAM Right 4/28/2020    Procedure: Fistulogram;  Surgeon: SHEA Alfonso III, MD;  Location: [facility] CATH LAB;  Service: Peripheral Vascular;  Laterality: Right;    FOOT AMPUTATION THROUGH METATARSAL      left foot    LEG AMPUTATION THROUGH KNEE   12/18/2013    left KATEA    R AVF  9/12/12    UPPER GASTROINTESTINAL ENDOSCOPY         Review of patient's allergies indicates:   Allergen Reactions    Lanthanum Nausea And Vomiting     Nausea and vomiting  Nausea and vomiting     Current Facility-Administered Medications   Medication Frequency    0.9%  NaCl infusion Once    acetaminophen tablet 650 mg Q4H PRN    albuterol-ipratropium 2.5 mg-0.5 mg/3 mL nebulizer solution 3 mL Q4H PRN    bisacodyL suppository 10 mg Daily PRN    dextrose 50% injection 12.5 g PRN    dextrose 50% injection 25 g PRN    glucagon (human recombinant) injection 1 mg PRN    glucose chewable tablet 16 g PRN    glucose chewable tablet 24 g PRN    HYDROcodone-acetaminophen 5-325 mg per tablet 1 tablet Q6H PRN    insulin aspart U-100 pen 0-5 Units QID (AC + HS) PRN    levetiracetam oral soln Soln 250 mg BID    metoclopramide HCl tablet 5 mg QID    midodrine tablet 5 mg PRN    ondansetron injection 4 mg Q8H PRN    pantoprazole suspension 40 mg BID    piperacillin-tazobactam 4.5 g in sodium chloride 0.9% 100 mL IVPB (ready to mix system) Q12H    prochlorperazine injection Soln 5 mg Q6H PRN    senna-docusate 8.6-50 mg per tablet 1 tablet BID    sodium chloride 0.9% bolus 500 mL Once    sodium chloride 0.9% flush 10 mL PRN    vancomycin - pharmacy to dose pharmacy to manage frequency     Family History     Problem Relation (Age of Onset)    Alcohol abuse Maternal Grandmother    Diabetes Brother, Maternal Grandfather    Early death Mother    Heart disease Father    Hyperlipidemia Father    Hypertension Father, Sister    Kidney disease Father        Tobacco Use    Smoking status: Former Smoker     Packs/day: 1.00     Years: 10.00     Pack years: 10.00    Smokeless tobacco: Never Used   Substance and Sexual Activity    Alcohol use: No    Drug use: No    Sexual activity: Never     Partners: Female     Birth control/protection: None     Review of Systems   Unable to perform  ROS: Other (nonverbal )     Objective:     Vital Signs (Most Recent):  Temp: 99 °F (37.2 °C) (08/06/20 1139)  Pulse: (!) 127 (08/06/20 1139)  Resp: 16 (08/06/20 1139)  BP: 133/80 (08/06/20 1139)  SpO2: 96 % (08/06/20 1139)  O2 Device (Oxygen Therapy): room air (08/06/20 0357) Vital Signs (24h Range):  Temp:  [97 °F (36.1 °C)-99 °F (37.2 °C)] 99 °F (37.2 °C)  Pulse:  [] 127  Resp:  [14-24] 16  SpO2:  [94 %-100 %] 96 %  BP: ()/(56-83) 133/80     Weight: 57.5 kg (126 lb 12.2 oz) (08/06/20 0100)  Body mass index is 20.46 kg/m².  Body surface area is 1.64 meters squared.    I/O last 3 completed shifts:  In: 1100 [I.V.:350; IV Piggyback:750]  Out: -     Physical Exam  Constitutional:       General: He is not in acute distress.     Appearance: He is ill-appearing.   HENT:      Head: Normocephalic and atraumatic.      Nose: Nose normal.      Mouth/Throat:      Mouth: Mucous membranes are moist.      Pharynx: Oropharynx is clear.   Eyes:      Conjunctiva/sclera: Conjunctivae normal.      Pupils: Pupils are equal, round, and reactive to light.   Neck:      Musculoskeletal: Neck supple.   Cardiovascular:      Rate and Rhythm: Regular rhythm. Tachycardia present.   Pulmonary:      Effort: Pulmonary effort is normal.      Breath sounds: Rhonchi present.   Abdominal:      General: Abdomen is flat. Bowel sounds are normal. There is no distension.      Palpations: Abdomen is soft.      Comments: PEG in place    Musculoskeletal:      Right lower leg: No edema.      Left lower leg: No edema.      Comments: Pt's extremities contracted; pt noted to be in fetal position and laying on side   Skin:     Comments: Multiple decubitus wounds noted to bony prominences and sacrum     Neurological:      Mental Status: He is alert.      Comments: Nonverbal and noninteractive          Significant Labs:  CBC:   Recent Labs   Lab 08/06/20  0515   WBC 13.07*   RBC 2.97*   HGB 8.4*   HCT 28.0*      MCV 94   MCH 28.3   MCHC 30.0*      CMP:   Recent Labs   Lab 08/06/20  0515   *   CALCIUM 9.6   ALBUMIN 1.2*   PROT 7.7      K 5.1   CO2 20*      BUN 61*   CREATININE 3.1*   ALKPHOS 143*   ALT 6*   AST 35   BILITOT 0.5     All labs within the past 24 hours have been reviewed.

## 2020-08-06 NOTE — SUBJECTIVE & OBJECTIVE
Interval History: No issues overnight.    Review of Systems   Unable to perform ROS: Patient nonverbal     Objective:     Vital Signs (Most Recent):  Temp: 99 °F (37.2 °C) (08/06/20 1139)  Pulse: (!) 127 (08/06/20 1139)  Resp: 16 (08/06/20 1139)  BP: 133/80 (08/06/20 1139)  SpO2: 96 % (08/06/20 1139) Vital Signs (24h Range):  Temp:  [97 °F (36.1 °C)-99 °F (37.2 °C)] 99 °F (37.2 °C)  Pulse:  [] 127  Resp:  [13-24] 16  SpO2:  [94 %-100 %] 96 %  BP: ()/(56-84) 133/80     Weight: 57.5 kg (126 lb 12.2 oz)  Body mass index is 20.46 kg/m².  No intake or output data in the 24 hours ending 08/06/20 1252   Physical Exam  Vitals signs and nursing note reviewed.   Constitutional:       General: He is sleeping. He is not in acute distress.     Comments: Lying on right side. Curled in fetal position.   Cardiovascular:      Rate and Rhythm: Regular rhythm. Tachycardia present.   Pulmonary:      Effort: Pulmonary effort is normal. No respiratory distress.   Neurological:      Motor: No tremor or seizure activity.         Significant Labs: All pertinent labs within the past 24 hours have been reviewed.    Significant Imaging: I have reviewed all pertinent imaging results/findings within the past 24 hours.   X-Ray Chest AP Portable 8/05/20: FINDINGS: Cardiac wires overlie the chest.  Lung volumes are low.  Left-sided central venous catheter overlies the SVC.  Right subclavian vascular stent is present.  There are patchy bilateral reticulonodular opacities, most pronounced in the right upper lung zone and bilateral lung bases.  Radiodensity overlying the left lung base is likely external to the patient.  No confluent area of consolidation.  No sizable pleural effusion.  No pneumothorax.   Impression:   New bilateral reticulonodular opacities suggestive of infectious/inflammatory process including pneumonia or aspiration in the setting of sepsis.

## 2020-08-06 NOTE — NURSING
Pt temp checked 102.9 oral and 103.6 axillary. MD notified. Tylenol administered. Will recheck temp. WCTM.

## 2020-08-06 NOTE — HPI
Vaughn Retana is a 55 year old Black man with renovascular hypertension, diabetes mellitus type 2 with nephropathy, neuropathy, gastroparesis, peripheral vascular disease, status post left foot metatarsal foot amputation in 2010, status post left below-the-knee amputation on 12/18/2013 due to osteomyelitis, hepatitis C, end stage renal disease on hemodialysis (Monday Wednesday Friday), hypotension associated with dialysis (on midodrine), history of left basal ganglia intracranial hemorrhage on 5/6/2013, right basal ganglia hemorrhage on 9/2/2016, right basal ganglia hemorrhage on 11/2/2016, left caudate head hemorrage on 2/22/2017, aphasia, dysphagia status post gastrostomy placement, seizure disorder, chronic diastolic heart failure, anemia, gastroesophageal reflux disease, history of gastrointestinal bleeding (esophagitis on esophagogastroduodenoscopy on 11/12/2019), decubitus ulcers, history of COVID-19 infection on 3/25/2020. He lives at [facility] in [place], [place]. His primary care physician is Dr. Cori Randall. He is DNR.              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               He presented to [facility] Emergency Department the next day with tachycardia, which kept him from getting his routine dialysis. In the emergency department, he was also found to have fever of 102.2° Fahrenheit. He had no hypoxia. Labs showed leukocytosis (WBC count 57620, from 03133 on 8/3/2020). BNP was elevated at 1748 pg/mL. Troponin was elevated at 0.168 ng/mL. COVID-19 test was negative. Chest X-ray showed new bilateral reticulonodular opacities. He was given piperacillin-tazobactam and vancomycin. He was admitted to Hospital Medicine Team [facility]

## 2020-08-06 NOTE — PROGRESS NOTES
Pharmacokinetic Assessment Follow Up: IV Vancomycin    Vancomycin serum concentration assessment(s):    The random level was drawn correctly and can be used to guide therapy at this time. The measurement is above the desired definitive target range of 15 to 20 mcg/mL.    Vancomycin Regimen Plan:    Re-dose when the random level is less than 25 mcg/mL, next level to be drawn at 0600 on 8/7    Drug levels (last 3 results):  Recent Labs   Lab Result Units 08/06/20  0515   Vancomycin, Random ug/mL 26.4       Pharmacy will continue to follow and monitor vancomycin.    Please contact pharmacy at extension [phone] for questions regarding this assessment.    Thank you for the consult,   Milena Gross, PharmD, BCPS       Patient brief summary:  Vaughn Retana is a 55 y.o. male initiated on antimicrobial therapy with IV Vancomycin for treatment of sepsis    The patient's current regimen is pulse dosing vancomycin, vancomycin 1000 mg IV x1 received 8/5 AM.    Drug Allergies:   Review of patient's allergies indicates:   Allergen Reactions    Lanthanum Nausea And Vomiting     Nausea and vomiting  Nausea and vomiting       Actual Body Weight:   57.5 kg    Renal Function:   Estimated Creatinine Clearance: 25.8 mL/min (A) (based on SCr of 2.6 mg/dL (H)).,     Dialysis Method (if applicable):  intermittent HD    CBC (last 72 hours):  Recent Labs   Lab Result Units 08/05/20  0820 08/06/20  0515   WBC K/uL 14.20* 13.07*   Hemoglobin g/dL 8.5* 8.4*   Hematocrit % 27.6* 28.0*   Platelets K/uL 301 249   Gran% % 82.9*  --    Lymph% % 6.2*  --    Mono% % 7.6  --    Eosinophil% % 2.4  --    Basophil% % 0.3  --    Differential Method  Automated  --        Metabolic Panel (last 72 hours):  Recent Labs   Lab Result Units 08/05/20  0820   Sodium mmol/L 135*   Potassium mmol/L 4.9   Chloride mmol/L 101   CO2 mmol/L 24   Glucose mg/dL 146*   BUN, Bld mg/dL 51*   Creatinine mg/dL 2.6*   Albumin g/dL 1.3*   Total Bilirubin mg/dL 0.4   Alkaline  Phosphatase U/L 151*   AST U/L 40   ALT U/L 9*   Magnesium mg/dL 1.7   Phosphorus mg/dL 3.5       Vancomycin Administrations:  vancomycin given in the last 96 hours                   vancomycin in dextrose 5 % 1 gram/250 mL IVPB 1,000 mg (mg) 1,000 mg New Bag 08/05/20 1024                Microbiologic Results:  Microbiology Results (last 7 days)     Procedure Component Value Units Date/Time    Blood culture x two cultures. Draw prior to antibiotics. [[id number]] Collected: 08/05/20 0820    Order Status: Completed Specimen: Blood from Line, Subclavian, Left Updated: 08/05/20 1715     Blood Culture, Routine No Growth to date    Narrative:      Aerobic and anaerobic    Blood culture x two cultures. Draw prior to antibiotics. [[id number]] Collected: 08/05/20 0910    Order Status: Completed Specimen: Blood from Peripheral, Hand, Right Updated: 08/05/20 1715     Blood Culture, Routine No Growth to date    Narrative:      Aerobic and anaerobic    Culture, Respiratory with Gram Stain [[id number]]     Order Status: No result Specimen: Sputum, Expectorated

## 2020-08-06 NOTE — PLAN OF CARE
Pt AAOx1. Pt spiked temp throughout shift, tylenol given; monitor temp. Weight shift assistance provided. Meal set up and assistance provided. Pt on RA. Cardiac monitor in place; tachy. Wound care provided; new wound care orders placed. IV abt administered, pt tolerated well. 500ml bolus NS administered. Patient is free of fall/trauma/injury. Denies CP, SOB, or pain/discomfort. All questions addressed. Will continue to monitor

## 2020-08-06 NOTE — ED NOTES
. Pt asleep and arouses to painful stimuli. Side rails up and bed in lowest position with wheels locked. Call light in reach. No distress noted.

## 2020-08-06 NOTE — ASSESSMENT & PLAN NOTE
Gastrostomy status  Aphasia  Seizure disorder as sequela of cerebrovascular accident  Continue home levetiracetam. Turn every 2 hours to avoid new decubitus ulcers.

## 2020-08-06 NOTE — PROGRESS NOTES
[facility] Hospital Medicine  Progress Note    Patient Name: Vaughn Retana  MRN: [id number]  Patient Class: IP- Inpatient   Admission Date: 8/5/2020  Length of Stay: 1 days  Attending Physician: Jono Hutchinson MD  Primary Care Provider: Cori Randall MD    [facility] Medicine Team: [facility] MED D Jono Hutchinson MD    Subjective:     Principal Problem:Sepsis due to pneumonia        HPI:  Vaughn Retana is a 55 year old Black man with renovascular hypertension, diabetes mellitus type 2 with nephropathy, neuropathy, gastroparesis, peripheral vascular disease, status post left foot metatarsal foot amputation in 2010, status post left below-the-knee amputation on 12/18/2013 due to osteomyelitis, hepatitis C, end stage renal disease on hemodialysis (Monday Wednesday Friday), hypotension associated with dialysis (on midodrine), history of left basal ganglia intracranial hemorrhage on 5/6/2013, right basal ganglia hemorrhage on 9/2/2016, right basal ganglia hemorrhage on 11/2/2016, left caudate head hemorrage on 2/22/2017, aphasia, dysphagia status post gastrostomy placement, seizure disorder, chronic diastolic heart failure, anemia, gastroesophageal reflux disease, history of gastrointestinal bleeding (esophagitis on esophagogastroduodenoscopy on 11/12/2019), decubitus ulcers, history of COVID-19 infection on 3/25/2020. He lives at [facility] in [place], [place]. His primary care physician is Dr. Cori Randall.               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               He presented to [facility] Emergency Department the next day with tachycardia, which kept him from getting his routine dialysis. In the emergency department, he was also found to have fever of 102.2° Fahrenheit. He had no hypoxia.  Labs showed leukocytosis (WBC count 36671, from 54177 on 8/3/2020). BNP was elevated at 1748 pg/mL. Troponin was elevated at 0.168 ng/mL. COVID-19 test was negative. Chest X-ray showed new bilateral reticulonodular opacities. He was given piperacillin-tazobactam and vancomycin. He was admitted to Hospital Medicine Team D.    Overview/Hospital Course:  Antibiotics were continued. He was noted to have a stage IV sacral decubitus ulcer, unstageable right leg ulcer, stage III right ankle ulcer, stage III right fourth toe ulcer, right fifth toe ulcer, unstageable right heel ulcer, and dressings to right buttock and hip, right knee, and left below-knee amputation stump. Wound Care was consulted.     Interval History: No issues overnight.    Review of Systems   Unable to perform ROS: Patient nonverbal     Objective:     Vital Signs (Most Recent):  Temp: 99 °F (37.2 °C) (08/06/20 1139)  Pulse: (!) 127 (08/06/20 1139)  Resp: 16 (08/06/20 1139)  BP: 133/80 (08/06/20 1139)  SpO2: 96 % (08/06/20 1139) Vital Signs (24h Range):  Temp:  [97 °F (36.1 °C)-99 °F (37.2 °C)] 99 °F (37.2 °C)  Pulse:  [] 127  Resp:  [13-24] 16  SpO2:  [94 %-100 %] 96 %  BP: ()/(56-84) 133/80     Weight: 57.5 kg (126 lb 12.2 oz)  Body mass index is 20.46 kg/m².  No intake or output data in the 24 hours ending 08/06/20 1252   Physical Exam  Vitals signs and nursing note reviewed.   Constitutional:       General: He is sleeping. He is not in acute distress.     Comments: Lying on right side. Curled in fetal position.   Cardiovascular:      Rate and Rhythm: Regular rhythm. Tachycardia present.   Pulmonary:      Effort: Pulmonary effort is normal. No respiratory distress.   Neurological:      Motor: No tremor or seizure activity.         Significant Labs: All pertinent labs within the past 24 hours have been reviewed.    Significant Imaging: I have reviewed all pertinent imaging results/findings within the past 24 hours.   X-Ray Chest AP Portable  8/05/20: FINDINGS: Cardiac wires overlie the chest.  Lung volumes are low.  Left-sided central venous catheter overlies the SVC.  Right subclavian vascular stent is present.  There are patchy bilateral reticulonodular opacities, most pronounced in the right upper lung zone and bilateral lung bases.  Radiodensity overlying the left lung base is likely external to the patient.  No confluent area of consolidation.  No sizable pleural effusion.  No pneumothorax.   Impression:   New bilateral reticulonodular opacities suggestive of infectious/inflammatory process including pneumonia or aspiration in the setting of sepsis.       Assessment/Plan:      * Sepsis due to pneumonia  Giving piperacillin-tazobactam and vancomycin. Wound Care consulted to evaluate wounds to make sure they are not contributing to sepsis.    Nursing home resident  Return to [facility] when medically stable.    End-stage renal disease on hemodialysis  Dialysis per Nephrology.    Gastroparesis  Continue home metoclopramide.    Hemodialysis-associated hypotension  History of renal hypertension  No longer on antihypertensive medications. Continue home midodrine as needed.    History of GI bleed  Continue home pantoprazole.    Chronic diastolic heart failure  Fluid removal with dialysis.    History of Intracerebral Hemorrhage: L BG 5/2013; R BG 9/2016; R BG 11/2016; L caudate head 2/2017  Gastrostomy status  Aphasia  Seizure disorder as sequela of cerebrovascular accident  Continue home levetiracetam. Turn every 2 hours to avoid new decubitus ulcers.    History of left below knee amputation 12/18/13  Turn patient every 2 hours.    Peripheral vascular disease in diabetes mellitus  Not on medications.    Dyslipidemia  Not on medications.    Anemia in chronic kidney disease  Stable.      VTE Risk Mitigation (From admission, onward)         Ordered     IP VTE HIGH RISK PATIENT  Once      08/05/20 1446     Place sequential compression device  Until  discontinued      08/05/20 1446                      Jono Hutchinson MD  Department of Hospital Medicine   [facility]

## 2020-08-06 NOTE — HOSPITAL COURSE
Antibiotics were continued. He was noted to have a stage IV sacral decubitus ulcer, unstageable right leg ulcer, stage III right ankle ulcer, stage III right fourth toe ulcer, right fifth toe ulcer, unstageable right heel ulcer, and dressings to right buttock and hip, right knee, and left below-knee amputation stump. Wound Care was consulted and noted a stage IV left hip decubitus ulcer. Orthopedic Surgery was consulted and did not note any odor or drainage to suggest infection of the left hip ulcer. On 2020, one of the blood cultures taken on admission grew coagulase-negative Staphylococcus, but it appeared to be a contaminant. Fevers persisted. He started coughing up thick sputum. Repeat chest X-ray on 2020 showed clear left lung but persisetent opacity in the right lung. With known dysphagia, antibiotics were changed to ampicillin-sulbactam to better treat presumed aspiration pneumonia. That night, he became hypotensive. Hypotension persisted after fluid bolus. He went into respiratory distress. He .    Time of death: 2020 23:20  Cause of death: Sepsis due to pneumonia

## 2020-08-06 NOTE — PLAN OF CARE
08/06/20 1254   Post-Acute Status   Post-Acute Authorization Placement   Post-Acute Placement Status Awaiting Internal Medical Clearance

## 2020-08-06 NOTE — PLAN OF CARE
CM to interview pt at bedside, pt unable to participate. CM called sister Alicia for verification. Pt is a [facility] resident of  [facility]. He  is bed bound with continuous tube feeds. He received HD treatments MWF @[facility]  Pt was transferred from [facility] for IVABX and wound treatment to [facility]. Sister stated she is ok with him returning  to NH.     08/06/20 1353   Discharge Assessment   Assessment Type Discharge Planning Assessment   Confirmed/corrected address and phone number on facesheet? Yes   Assessment information obtained from? Caregiver   Expected Length of Stay (days) 4   Communicated expected length of stay with patient/caregiver yes   Prior to hospitilization cognitive status: Unable to Assess   Prior to hospitalization functional status: Wheelchair Bound;Completely Dependent   Current cognitive status: Unable to Assess   Current Functional Status: Completely Dependent;Wheelchair Bound   Facility Arrived From: [facility]   Lives With facility resident   Able to Return to Prior Arrangements yes   Is patient able to care for self after discharge? Yes   Who are your caregiver(s) and their phone number(s)? Sister (Alicia Retana) [phone]   Patient's perception of discharge disposition nursing home   Readmission Within the Last 30 Days no previous admission in last 30 days   Patient currently being followed by outpatient case management? No   Patient currently receives any other outside agency services? No   Equipment Currently Used at Home none   Do you have any problems affording any of your prescribed medications? No   Is the patient taking medications as prescribed? yes   Does the patient have transportation home? Yes   Transportation Anticipated family or friend will provide   Does the patient receive services at the [facility]? No   Discharge Plan A Return to nursing home   Discharge  Plan B Return to Nursing Home   DME Needed Upon Discharge  none   Patient/Family in Agreement with Plan yes   Flaquita Carbajal, MSN  Case Management  Ext [phone]

## 2020-08-07 PROBLEM — A41.2: Status: ACTIVE | Noted: 2020-01-01

## 2020-08-07 NOTE — SUBJECTIVE & OBJECTIVE
Past Medical History:   Diagnosis Date    Amputation stump pain 9/10/2013    Aspiration pneumonia 7/27/2015    Asterixis 11/8/2016    C. difficile colitis 8/7/2015    Cholelithiasis without obstruction 8/25/2015    Chronic diastolic heart failure     2-23-17   1 - Low normal to mildly depressed left ventricular systolic function (EF 50-55%).    2 - Right ventricular enlargement with mildly depressed systolic function.    3 - Left ventricular diastolic dysfunction.    4 - Right atrial enlargement.    5 - Severe tricuspid regurgitation.    6 - Pulmonary hypertension. The estimated PA systolic pressure is 86 mmHg.    7 - Increased central venous pressure.     Chronic low back pain 12/1/2015    Closed head injury 9/8/2016    COVID-19 virus infection 3/28/2020    DVT (deep venous thrombosis) 7/28/2017    ESRD on hemodialysis 2/7/2013    MWF at [facility]   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    Metabolic acidosis, IAG, reduced excretion of inorganic acids     Myoclonic jerking 9/20/2016    Noncompliance with medication regimen 12/4/2018    Secondary hyperparathyroidism (of renal origin)     Secondary pulmonary hypertension 3/23/2017    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    TB lung, latent 08/25/2015    Negative Quantiferon Gold 3-23-17       Past Surgical History:   Procedure Laterality Date    COLONOSCOPY      COLONOSCOPY N/A 4/4/2017    Procedure: COLONOSCOPY;  Surgeon: Walker Stern MD;  Location: [facility] ([address] [place] [address]);  Service: Endoscopy;  Laterality: N/A;  PA Systolic  Pressure 85.56. HD Patient MWF, K+ lab prior to procedure.     DECLOTTING OF VASCULAR GRAFT N/A 4/28/2020    Procedure: Declot, Vascular Graft;  Surgeon: SHEA Alfonso III, MD;  Location: [facility] CATH LAB;  Service: Peripheral Vascular;  Laterality: N/A;    ESOPHAGOGASTRODUODENOSCOPY N/A 6/12/2018    Procedure: EGD (ESOPHAGOGASTRODUODENOSCOPY);  Surgeon: Man Galicia MD;  Location: [facility] (2ND FLR);  Service: Endoscopy;  Laterality: N/A;  EGD in 8-12 weeks with Dr. Galicia on 4th floor for follow up erosive esophagitis and Mejia's surveillance.    Patient should be on Pantoprazole 40mg every 12 hours or the equivulant of another PPI    awaiting for patient to reply back regarding changing    ESOPHAGOGASTRODUODENOSCOPY N/A 3/7/2019    Procedure: EGD (ESOPHAGOGASTRODUODENOSCOPY);  Surgeon: Man Galicia MD;  [facility]  Service: Endoscopy;  Laterality: N/A;    ESOPHAGOGASTRODUODENOSCOPY N/A 9/23/2019    Procedure: EGD (ESOPHAGOGASTRODUODENOSCOPY);  Surgeon: Keanu Rainey MD;  Location: [facility] (2ND FLR);  Service: Endoscopy;  Laterality: N/A;    ESOPHAGOGASTRODUODENOSCOPY N/A 10/2/2019    Procedure: EGD (ESOPHAGOGASTRODUODENOSCOPY);  Surgeon: Kevin De La Paz MD;  [facility]  Service: Endoscopy;  Laterality: N/A;    ESOPHAGOGASTRODUODENOSCOPY N/A 11/12/2019    Procedure: EGD (ESOPHAGOGASTRODUODENOSCOPY);  Surgeon: Kevin De La Paz MD;  Location: [facility] (2ND FLR);  Service: Endoscopy;  Laterality: N/A;    ESOPHAGOGASTRODUODENOSCOPY N/A 2/14/2020    Procedure: EGD (ESOPHAGOGASTRODUODENOSCOPY);  Surgeon: Kevin De La Paz MD;  Location: [facility] (2ND FLR);  Service: Endoscopy;  Laterality: N/A;    FISTULOGRAM Right 4/28/2020    Procedure: Fistulogram;  Surgeon: SHEA Alfonso III, MD;  Location: [facility] CATH LAB;  Service: Peripheral Vascular;  Laterality: Right;    FOOT AMPUTATION THROUGH METATARSAL      left foot    LEG AMPUTATION THROUGH KNEE   12/18/2013    left LUDY    R AVF  9/12/12    UPPER GASTROINTESTINAL ENDOSCOPY         Review of patient's allergies indicates:   Allergen Reactions    Lanthanum Nausea And Vomiting     Nausea and vomiting  Nausea and vomiting       Current Facility-Administered Medications   Medication    acetaminophen tablet 650 mg    albuterol-ipratropium 2.5 mg-0.5 mg/3 mL nebulizer solution 3 mL    bisacodyL suppository 10 mg    dextrose 50% injection 12.5 g    dextrose 50% injection 25 g    glucagon (human recombinant) injection 1 mg    glucose chewable tablet 16 g    glucose chewable tablet 24 g    HYDROcodone-acetaminophen 5-325 mg per tablet 1 tablet    insulin aspart U-100 pen 0-5 Units    levetiracetam oral soln Soln 250 mg    metoclopramide HCl tablet 5 mg    midodrine tablet 5 mg    ondansetron injection 4 mg    pantoprazole suspension 40 mg    piperacillin-tazobactam 4.5 g in sodium chloride 0.9% 100 mL IVPB (ready to mix system)    prochlorperazine injection Soln 5 mg    senna-docusate 8.6-50 mg per tablet 1 tablet    sodium chloride 0.9% flush 10 mL    vancomycin - pharmacy to dose     Family History     Problem Relation (Age of Onset)    Alcohol abuse Maternal Grandmother    Diabetes Brother, Maternal Grandfather    Early death Mother    Heart disease Father    Hyperlipidemia Father    Hypertension Father, Sister    Kidney disease Father        Tobacco Use    Smoking status: Former Smoker     Packs/day: 1.00     Years: 10.00     Pack years: 10.00    Smokeless tobacco: Never Used   Substance and Sexual Activity    Alcohol use: No    Drug use: No    Sexual activity: Never     Partners: Female     Birth control/protection: None     ROS per primary team, nonverbal  Objective:     Vital Signs (Most Recent):  Temp: 99.9 °F (37.7 °C) (08/06/20 1717)  Pulse: (!) 114 (08/06/20 1900)  Resp: 16 (08/06/20 1539)  BP: (!) 125/59 (08/06/20 1539)  SpO2: 97 % (08/06/20 1900) Vital Signs (24h Range):  Temp:   "[97 °F (36.1 °C)-102.9 °F (39.4 °C)] 99.9 °F (37.7 °C)  Pulse:  [] 114  Resp:  [16-24] 16  SpO2:  [92 %-99 %] 97 %  BP: ()/(56-80) 125/59     Weight: 57.5 kg (126 lb 12.2 oz)  Height: 5' 6" (167.6 cm)  Body mass index is 20.46 kg/m².      Intake/Output Summary (Last 24 hours) at 8/6/2020 1901  Last data filed at 8/6/2020 1845  Gross per 24 hour   Intake 520 ml   Output --   Net 520 ml       Ortho/SPM Exam     Vitals: Afebrile.  Vital signs stable.  General: No acute distress. Non-verbal. Cachectic   Cardio: Regular rate.  Chest: No increased work of breathing.    Right Upper Extremity    -Pressure ulcer over dorsal 5th MC head  -Contracted  -ROM restricted at shoulder, elbow, wrist  -Brisk cap refill  -Warm well perfused extremities  -2+ Radial palpable    Left Upper Extremity    -Skin intact, contracted  -Brisk cap refill  -Warm well perfused extremities  -2+ Radial palpable    Right Lower Extremity Exam    - Multiple areas of skin breakdown with small ulcer on anterior knee and larger 2x2cm on medial knee  - Multiple ulcers over lateral ankle, foot over the 5th MT, and lateral small toe.  - Multiple areas of skin breakdown over right hip  - Right heel pressure ulcer   - Contracted throughout   - DP and PT palpated  2+  - Capillary Refill <3s      Left Lower Extremity Exam    - Left stage 4 pressure ulcer just posterior to greater trochanter 9x5.5 cm  - Left BKA with skin loss over the distal stump  - DP and PT palpated  2+  - Capillary Refill <3s    Sacral decubitus ulcer, stage 4     All other joints (shoulder/elbow/wrist/hip/knee/ankle) were examined and had full ROM and were non-tender to palpation.      Significant Labs:   A1C:   Recent Labs   Lab 05/01/20  0202   HGBA1C 5.2       BMP:   Recent Labs   Lab 08/06/20  0515 08/06/20  1741   *  --      --    K 5.1  --      --    CO2 20*  --    BUN 61*  --    CREATININE 3.1*  --    CALCIUM 9.6  --    MG 1.7 1.7     CBC:   Recent Labs "   Lab 08/05/20 0820 08/06/20  0515   WBC 14.20* 13.07*   HGB 8.5* 8.4*   HCT 27.6* 28.0*    249     CMP:   Recent Labs   Lab 08/05/20 0820 08/06/20  0515   * 137   K 4.9 5.1    101   CO2 24 20*   * 130*   BUN 51* 61*   CREATININE 2.6* 3.1*   CALCIUM 9.1 9.6   PROT 8.0 7.7   ALBUMIN 1.3* 1.2*   BILITOT 0.4 0.5   ALKPHOS 151* 143*   AST 40 35   ALT 9* 6*   ANIONGAP 10 16   EGFRNONAA 26.4* 21.3*     Coagulation:   Recent Labs   Lab 08/05/20 0820   LABPROT 13.8*   INR 1.3*   APTT 41.7*     CRP: No results for input(s): CRP in the last 48 hours.  Prealbumin:   Recent Labs   Lab 08/06/20  1741   PREALBUMIN 7*     All pertinent labs within the past 24 hours have been reviewed.    Significant Imaging: I have reviewed all pertinent imaging results/findings.   AP pelvis:  Study limited by positioning, no obvious signs of osteomyelitis, soft tissue defect over left greater trochanter

## 2020-08-07 NOTE — NURSING
Patient stable no distress noted. Report called to Dialysis Nurse ISMAEL Sharma who verbalized understanding.

## 2020-08-07 NOTE — PLAN OF CARE
Pt up in bed with eyes open turn 2 q with assist x1 IV ABT administered wound care done tele monitoring continuous 02 no acute distress noted  Problem: Wound  Goal: Optimal Wound Healing  Outcome: Ongoing, Progressing     Problem: Fall Injury Risk  Goal: Absence of Fall and Fall-Related Injury  Outcome: Ongoing, Progressing     Problem: Skin Injury Risk Increased  Goal: Skin Health and Integrity  Outcome: Ongoing, Progressing     Problem: Adult Inpatient Plan of Care  Goal: Plan of Care Review  Outcome: Ongoing, Progressing  Goal: Patient-Specific Goal (Individualization)  Outcome: Ongoing, Progressing  Goal: Absence of Hospital-Acquired Illness or Injury  Outcome: Ongoing, Progressing  Goal: Optimal Comfort and Wellbeing  Outcome: Ongoing, Progressing  Goal: Readiness for Transition of Care  Outcome: Ongoing, Progressing  Goal: Rounds/Family Conference  Outcome: Ongoing, Progressing

## 2020-08-07 NOTE — CARE UPDATE
Rapid Response Nurse Chart Check     Chart check completed, abnormal VS noted. Please call [phone] for further concerns or assistance.

## 2020-08-07 NOTE — CONSULTS
Plastic Surgery Consultation    Date of Consultation:   08/07/2020    Reason for Consultation:   Decubitus ulcers    History of Present Illness:  History obtained per chart review, patient nonverbal.    Vaughn Retana is a 55 year old male with a history of HTN, T2DM, gastroparesis, PVD (s/p L BKA in 12/13), ESRD (HD MWF), multiple ICH, HFpEF, GERD, and multiple decubitus ulcers presently hospitalized for bacteremia with an as yet unidentified source, pulmonary vs decubitus ulcer.    Plastic surgery was consulted for opinion regarding multiple stage pressure ulcers including a stage IV pressure ulcer overlying the left greater trochanter.     Past Medical History:    has a past medical history of Amputation stump pain (9/10/2013), Aspiration pneumonia (7/27/2015), Asterixis (11/8/2016), C. difficile colitis (8/7/2015), Cholelithiasis without obstruction (8/25/2015), Chronic diastolic heart failure, Chronic low back pain (12/1/2015), Closed head injury (9/8/2016), COVID-19 virus infection (3/28/2020), DVT (deep venous thrombosis) (7/28/2017), ESRD on hemodialysis (2/7/2013), GERD (gastroesophageal reflux disease), HCV antibody positive, Hemiparesis affecting left side as late effect of stroke (11/08/2016), History of Intracerebral Hemorrhage: L BG 5/2013; R BG 9/2016; R BG 11/2016; L caudate head 2/2017 (11/2/2016), Hypertension, left basal ganglia ICH 5/2013 (11/2/2016), Left Caudate Head ICH 2/22/2017 (2/24/2017), 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    Past Surgical History:    has a past surgical history that includes R AVF (9/12/12); Leg amputation through knee  (12/18/2013); Foot amputation through metatarsal; Colonoscopy; Colonoscopy (N/A, 4/4/2017); Esophagogastroduodenoscopy (N/A, 6/12/2018); Upper gastrointestinal endoscopy; Esophagogastroduodenoscopy (N/A, 3/7/2019); Esophagogastroduodenoscopy (N/A, 9/23/2019); Esophagogastroduodenoscopy (N/A, 10/2/2019); Esophagogastroduodenoscopy (N/A, 11/12/2019); Esophagogastroduodenoscopy (N/A, 2/14/2020); Fistulogram (Right, 4/28/2020); and Declotting of vascular graft (N/A, 4/28/2020).    Social History:  Social History     Tobacco Use    Smoking status: Former Smoker     Packs/day: 1.00     Years: 10.00     Pack years: 10.00    Smokeless tobacco: Never Used   Substance Use Topics    Alcohol use: No     Social History     Substance and Sexual Activity   Drug Use No       Family History:  Family History   Problem Relation Age of Onset    Early death Mother     Kidney disease Father     Hypertension Father     Heart disease Father     Hyperlipidemia Father     Diabetes Brother     Alcohol abuse Maternal Grandmother     Diabetes Maternal Grandfather     Hypertension Sister     Melanoma Neg Hx        Allergies:  Review of patient's allergies indicates:   Allergen Reactions    Lanthanum Nausea And Vomiting     Nausea and vomiting  Nausea and vomiting       Home Medications:  Prior to Admission medications    Medication Sig Start Date End Date Taking? Authorizing Provider   acetaminophen (TYLENOL) 325 MG tablet Take 2 tablets (650 mg total) by mouth every 8 (eight) hours as needed. 8/27/19   Renea Gallegos MD   albuterol (PROVENTIL/VENTOLIN HFA) 90 mcg/actuation inhaler Inhale 2 puffs into the lungs every 6 (six) hours. Rescue 5/4/20 5/4/21  Charmaine Pedro MD   bisacodyL (DULCOLAX) 10 mg Supp Place 1 suppository (10 mg total) rectally daily as needed (if no BM x  2 days). 5/4/20   Charmaine Pedro MD   HYDROcodone-acetaminophen (NORCO) 5-325 mg per tablet 1 tablet by Per G Tube route every 6 (six) hours as  needed. 20   Charmaine Pedro MD   insulin aspart U-100 (NOVOLOG) 100 unit/mL (3 mL) InPn pen Inject 0-5 Units into the skin before meals and at bedtime as needed (Hyperglycemia). 20  Charmaine Pedro MD   levetiracetam oral soln 500 mg/5 mL (5 mL) Soln 2.5 mLs (250 mg total) by Per G Tube route 2 (two) times daily. 20  Charmaine Pedro MD   metoclopramide HCl (REGLAN) 5 MG tablet 1 tablet (5 mg total) by Per G Tube route 4 (four) times daily. 20   Charmaine Pedro MD   midodrine (PROAMATINE) 5 MG Tab 1 tablet (5 mg total) by Per G Tube route as needed (hypotension with dialysis). Per dialysis doctor protocols 20  Charmaine Pedro MD   ondansetron (ZOFRAN) 8 MG tablet Take 1 tablet (8 mg total) by mouth every 12 (twelve) hours as needed for Nausea. 19   Gavino Cordoba MD   pantoprazole (PROTONIX) 40 mg GrPS 1 packet (40 mg total) by Per G Tube route 2 (two) times daily. 20  Charmaine Pedro MD   senna-docusate 8.6-50 mg (PERICOLACE) 8.6-50 mg per tablet 1 tablet by Per G Tube route 2 (two) times daily. 20   Charmaine Pedro MD       Review of Systems:  Negative except for what is noted in HPI    Physical Exam:  VITAL SIGNS:   Vitals:    20 2337 20 0142 20 0257 20 0733   BP: (!) 103/59      BP Location:       Patient Position:       Pulse: (!) 115 (!) 112 (!) 114 (!) 111   Resp: 18 18     Temp: 98.5 °F (36.9 °C)      TempSrc:       SpO2: 96% 98% 98% 98%   Weight:       Height:         TMAX: Temp (24hrs), Av.5 °F (38.1 °C), Min:98.2 °F (36.8 °C), Max:102.9 °F (39.4 °C)     General appearance - thin, chronically ill appearing and non-verbal on exam  Mental Status - uncooperative, non-verbal  Eyes - sclera anicteric   Ears - right ear normal, left ear normal   Chest - normal effort, no accessory muscle use, expansion symmetric bilaterally   Heart - normal rate and regular rhythm   MSK -    RUE-dry ulcer with  eschar overlying 5th metacarpal head RLE- multiple areas of skin breakdown overlying medial and anterior knee, multiple moist ulcerations overlying lateral aspect of right ankle and 5th metatarsal, right heel with dry eschar  LLE- [id number] necrotic ulceration overlying left hip, area of undermining at 9 o clock position, tissue appears entirely non-viable, large pressure injury over site of L BKA   Thorax- multiple areas of skin breakdown, large right posterior thorax pressure ulcer, tissue appears viable  Abd - patient fecally incontinent on exam      Diagnostic Data:  Recent Results (from the past 336 hour(s))   CBC with Automated Differential    Collection Time: 08/07/20  4:23 AM   Result Value Ref Range    WBC 12.05 3.90 - 12.70 K/uL    Hemoglobin 7.6 (L) 14.0 - 18.0 g/dL    Hematocrit 24.3 (L) 40.0 - 54.0 %    Platelets SEE COMMENT 150 - 350 K/uL   CBC with Automated Differential    Collection Time: 08/06/20  5:15 AM   Result Value Ref Range    WBC 13.07 (H) 3.90 - 12.70 K/uL    Hemoglobin 8.4 (L) 14.0 - 18.0 g/dL    Hematocrit 28.0 (L) 40.0 - 54.0 %    Platelets 249 150 - 350 K/uL   CBC auto differential    Collection Time: 08/05/20  8:20 AM   Result Value Ref Range    WBC 14.20 (H) 3.90 - 12.70 K/uL    Hemoglobin 8.5 (L) 14.0 - 18.0 g/dL    Hematocrit 27.6 (L) 40.0 - 54.0 %    Platelets 301 150 - 350 K/uL     No results found for this or any previous visit (from the past 336 hour(s)).  Lab Results   Component Value Date    ALBUMIN 1.2 (L) 08/07/2020     Lab Results   Component Value Date    .4 (H) 05/03/2020     Lab Results   Component Value Date    INR 1.3 (H) 08/05/2020     No results found for: PTT    Microbiology Results (last 7 days)     Procedure Component Value Units Date/Time    Blood culture x two cultures. Draw prior to antibiotics. [[id number]] Collected: 08/05/20 0820    Order Status: Completed Specimen: Blood from Line, Subclavian, Left Updated: 08/07/20 0505     Blood Culture, Routine Gram  stain miya bottle: Gram positive cocci in clusters resembling Staph       Results called to and read back by: Deb Hopkins RN 08/07/2020  05:05    Narrative:      Aerobic and anaerobic    Blood culture x two cultures. Draw prior to antibiotics. [[id number]] Collected: 08/05/20 0910    Order Status: Completed Specimen: Blood from Peripheral, Hand, Right Updated: 08/06/20 1213     Blood Culture, Routine No Growth to date      No Growth to date    Narrative:      Aerobic and anaerobic    Culture, Respiratory with Gram Stain [[id number]]     Order Status: No result Specimen: Sputum, Expectorated           Assessment:  55 y.o.male with a history of HTN, T2DM, gastroparesis, PVD (s/p L BKA in 12/13), ESRD (HD MWF), multiple ICH, HFpEF, GERD, and multiple decubitus ulcers presently hospitalized for bacteremia with an as yet unidentified source, pulmonary vs decubitus ulcer.    Plan:  - patient with multiple ulcers including LLE above BKA, LLE over hip, that may benefit from debridement prior to definitive closure with flap  - patient may also benefit from an ostomy to allow for adequate healing of multiple sacral ulcers  - given patient's nutritional status could also benefit from supplementation for improved wound healing        Elgin Hoff MD  Plastic Surgery

## 2020-08-07 NOTE — NURSING
Notified by lab positive blood culture Notified [facility] MED D MD connect and made aware

## 2020-08-07 NOTE — CONSULTS
[facility]  Orthopedics  Consult Note    Patient Name: Vaughn Retana  MRN: [id number]  Admission Date: 8/5/2020  Hospital Length of Stay: 1 days  Attending Provider: Jono Hutchinson MD  Primary Care Provider: Cori Randall MD      Inpatient consult to Orthopedic Surgery  Consult performed by: Rush Hubbard MD  Consult ordered by: Jono Hutchinson MD        Subjective:     Principal Problem:Sepsis due to pneumonia    Chief Complaint:   Chief Complaint   Patient presents with    Tachycardia     Pt arrived to his dialysis apt and staff found pt to have a HR of 130-ST. Pt is non-verbal at baseline.         HPI: Vaughn Retana is a 55 y.o. male with extensive medical history including ESRD on dialysis MWF (has not received it today), L below knee amputation 2/2 osteomyelitis with Dr. Houston in 2013, dCHF (last echo 8/2/19 [facility] DM last A1c 4.7GERD, h/o multiple ICH (2013, 2016 x2, 2017) w/o left side residual deficits, decubitus ulcers and multiple instances of GI bleed (last EGD 11/12/19, esophagitis) nonverbal at baseline COVID-19 positive on 03/25/2020 who presents to the ED from HD clinic with chief complaint of tachycardia.  Patient did not undergo his dialysis today because he was noted to be tachycardic.  EMS was called and he was sent to the ED.  Patient was recently admitted to the hospital for culture negative endocarditis and UTI.  He was then [facility] for ongoing care and antibiotics.  He was discharged from the LTAC 2 days ago and went back to [facility] yesterday.        While in the ED, patient noted to be tachycardic with HR in 130s, febrile with T-max of 102.2°, saturating 98% on room air.  Labs reveal leukocytosis with WBC of 14.2.  BUN/creatinine of 51/2.6.  BNP elevated to 1748.  Troponin elevated to 0.168.  Procalcitonin elevated to 3.5 to.  Lactate WNL.  Patient initially tested positive for COVID on 03/25/2020, since he is  120 days away from his 1st positive test, he was retested and COVID-19 test this time was negative.  CXR showing New bilateral reticulonodular opacities suggestive of infectious/inflammatory process including pneumonia or aspiration in the setting of sepsis.  Patient started on vancomycin, Zosyn and admitted to Hospital Medicine for further management.    Orthopedics consulted due to pressure ulcer just posterior to left greater trochanter.     Past Medical History:   Diagnosis Date    Amputation stump pain 9/10/2013    Aspiration pneumonia 7/27/2015    Asterixis 11/8/2016    C. difficile colitis 8/7/2015    Cholelithiasis without obstruction 8/25/2015    Chronic diastolic heart failure     2-23-17   1 - Low normal to mildly depressed left ventricular systolic function (EF 50-55%).    2 - Right ventricular enlargement with mildly depressed systolic function.    3 - Left ventricular diastolic dysfunction.    4 - Right atrial enlargement.    5 - Severe tricuspid regurgitation.    6 - Pulmonary hypertension. The estimated PA systolic pressure is 86 mmHg.    7 - Increased central venous pressure.     Chronic low back pain 12/1/2015    Closed head injury 9/8/2016    COVID-19 virus infection 3/28/2020    DVT (deep venous thrombosis) 7/28/2017    ESRD on hemodialysis 2/7/2013    MWF at 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    Metabolic acidosis, IAG, reduced excretion of inorganic acids     Myoclonic jerking 9/20/2016    Noncompliance with medication regimen 12/4/2018    Secondary  hyperparathyroidism (of renal origin)     Secondary pulmonary hypertension 3/23/2017    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    TB lung, latent 08/25/2015    Negative Quantiferon Gold 3-23-17       Past Surgical History:   Procedure Laterality Date    COLONOSCOPY      COLONOSCOPY N/A 4/4/2017    Procedure: COLONOSCOPY;  Surgeon: Walker Stern MD;  Location: [facility] (2ND FLR);  Service: Endoscopy;  Laterality: N/A;  PA Systolic Pressure 85.56. HD Patient MWF, K+ lab prior to procedure.     DECLOTTING OF VASCULAR GRAFT N/A 4/28/2020    Procedure: Declot, Vascular Graft;  Surgeon: SHEA Alfonso III, MD;  Location: [facility] CATH LAB;  Service: Peripheral Vascular;  Laterality: N/A;    ESOPHAGOGASTRODUODENOSCOPY N/A 6/12/2018    Procedure: EGD (ESOPHAGOGASTRODUODENOSCOPY);  Surgeon: Man Galicia MD;  [facility]  Service: Endoscopy;  Laterality: N/A;  EGD in 8-12 weeks with Dr. Galicia on 4th floor for follow up erosive esophagitis and Mejia's surveillance.    Patient should be on Pantoprazole 40mg every 12 hours or the equivulant of another PPI    awaiting for patient to reply back regarding changing    ESOPHAGOGASTRODUODENOSCOPY N/A 3/7/2019    Procedure: EGD (ESOPHAGOGASTRODUODENOSCOPY);  Surgeon: Man Galicia MD;  [facility]: [facility]  Service: Endoscopy;  Laterality: N/A;    ESOPHAGOGASTRODUODENOSCOPY N/A 9/23/2019    Procedure: EGD (ESOPHAGOGASTRODUODENOSCOPY);  Surgeon: Keanu Rainey MD;  Location: [facility] ([facility]  Service: Endoscopy;  Laterality: N/A;    ESOPHAGOGASTRODUODENOSCOPY N/A 10/2/2019    Procedure: EGD (ESOPHAGOGASTRODUODENOSCOPY);  Surgeon: Kevin De La Paz MD;  Location: [facility] (2ND FLR);  Service: Endoscopy;  Laterality: N/A;    ESOPHAGOGASTRODUODENOSCOPY N/A 11/12/2019    Procedure: EGD (ESOPHAGOGASTRODUODENOSCOPY);  Surgeon: Kevin De La Paz MD;  Location: [facility] (2ND FLR);  Service: Endoscopy;  Laterality: N/A;     ESOPHAGOGASTRODUODENOSCOPY N/A 2/14/2020    Procedure: EGD (ESOPHAGOGASTRODUODENOSCOPY);  Surgeon: Kevin De La Paz MD;  Location: [facility] ([address] [place] [address]);  Service: Endoscopy;  Laterality: N/A;    FISTULOGRAM Right 4/28/2020    Procedure: Fistulogram;  Surgeon: SHEA Alfonso III, MD;  Location: [facility] CATH LAB;  Service: Peripheral Vascular;  Laterality: Right;    FOOT AMPUTATION THROUGH METATARSAL      left foot    LEG AMPUTATION THROUGH KNEE  12/18/2013    left BKA    R AVF  9/12/12    UPPER GASTROINTESTINAL ENDOSCOPY         Review of patient's allergies indicates:   Allergen Reactions    Lanthanum Nausea And Vomiting     Nausea and vomiting  Nausea and vomiting       Current Facility-Administered Medications   Medication    acetaminophen tablet 650 mg    albuterol-ipratropium 2.5 mg-0.5 mg/3 mL nebulizer solution 3 mL    bisacodyL suppository 10 mg    dextrose 50% injection 12.5 g    dextrose 50% injection 25 g    glucagon (human recombinant) injection 1 mg    glucose chewable tablet 16 g    glucose chewable tablet 24 g    HYDROcodone-acetaminophen 5-325 mg per tablet 1 tablet    insulin aspart U-100 pen 0-5 Units    levetiracetam oral soln Soln 250 mg    metoclopramide HCl tablet 5 mg    midodrine tablet 5 mg    ondansetron injection 4 mg    pantoprazole suspension 40 mg    piperacillin-tazobactam 4.5 g in sodium chloride 0.9% 100 mL IVPB (ready to mix system)    prochlorperazine injection Soln 5 mg    senna-docusate 8.6-50 mg per tablet 1 tablet    sodium chloride 0.9% flush 10 mL    vancomycin - pharmacy to dose     Family History     Problem Relation (Age of Onset)    Alcohol abuse Maternal Grandmother    Diabetes Brother, Maternal Grandfather    Early death Mother    Heart disease Father    Hyperlipidemia Father    Hypertension Father, Sister    Kidney disease Father        Tobacco Use    Smoking status: Former Smoker     Packs/day: 1.00     Years: 10.00     Pack years: 10.00     "Smokeless tobacco: Never Used   Substance and Sexual Activity    Alcohol use: No    Drug use: No    Sexual activity: Never     Partners: Female     Birth control/protection: None     ROS per primary team, nonverbal  Objective:     Vital Signs (Most Recent):  Temp: 99.9 °F (37.7 °C) (08/06/20 1717)  Pulse: (!) 114 (08/06/20 1900)  Resp: 16 (08/06/20 1539)  BP: (!) 125/59 (08/06/20 1539)  SpO2: 97 % (08/06/20 1900) Vital Signs (24h Range):  Temp:  [97 °F (36.1 °C)-102.9 °F (39.4 °C)] 99.9 °F (37.7 °C)  Pulse:  [] 114  Resp:  [16-24] 16  SpO2:  [92 %-99 %] 97 %  BP: ()/(56-80) 125/59     Weight: 57.5 kg (126 lb 12.2 oz)  Height: 5' 6" (167.6 cm)  Body mass index is 20.46 kg/m².      Intake/Output Summary (Last 24 hours) at 8/6/2020 1901  Last data filed at 8/6/2020 1845  Gross per 24 hour   Intake 520 ml   Output --   Net 520 ml       Ortho/SPM Exam     Vitals: Afebrile.  Vital signs stable.  General: No acute distress. Non-verbal. Cachectic   Cardio: Regular rate.  Chest: No increased work of breathing.    Right Upper Extremity    -Pressure ulcer over dorsal 5th MC head  -Contracted  -ROM restricted at shoulder, elbow, wrist  -Brisk cap refill  -Warm well perfused extremities  -2+ Radial palpable    Left Upper Extremity    -Skin intact, contracted  -Brisk cap refill  -Warm well perfused extremities  -2+ Radial palpable    Right Lower Extremity Exam    - Multiple areas of skin breakdown with small ulcer on anterior knee and larger 2x2cm on medial knee  - Multiple ulcers over lateral ankle, foot over the 5th MT, and lateral small toe.  - Multiple areas of skin breakdown over right hip  - Right heel pressure ulcer   - Contracted throughout   - DP and PT palpated  2+  - Capillary Refill <3s      Left Lower Extremity Exam    - Left stage 4 pressure ulcer just posterior to greater trochanter 9x5.5 cm  - Left BKA with skin loss over the distal stump  - DP and PT palpated  2+  - Capillary Refill <3s    Sacral " decubitus ulcer, stage 4     All other joints (shoulder/elbow/wrist/hip/knee/ankle) were examined and had full ROM and were non-tender to palpation.      Significant Labs:   A1C:   Recent Labs   Lab 05/01/20  0202   HGBA1C 5.2       BMP:   Recent Labs   Lab 08/06/20 0515 08/06/20  1741   *  --      --    K 5.1  --      --    CO2 20*  --    BUN 61*  --    CREATININE 3.1*  --    CALCIUM 9.6  --    MG 1.7 1.7     CBC:   Recent Labs   Lab 08/05/20 0820 08/06/20 0515   WBC 14.20* 13.07*   HGB 8.5* 8.4*   HCT 27.6* 28.0*    249     CMP:   Recent Labs   Lab 08/05/20 0820 08/06/20  0515   * 137   K 4.9 5.1    101   CO2 24 20*   * 130*   BUN 51* 61*   CREATININE 2.6* 3.1*   CALCIUM 9.1 9.6   PROT 8.0 7.7   ALBUMIN 1.3* 1.2*   BILITOT 0.4 0.5   ALKPHOS 151* 143*   AST 40 35   ALT 9* 6*   ANIONGAP 10 16   EGFRNONAA 26.4* 21.3*     Coagulation:   Recent Labs   Lab 08/05/20 0820   LABPROT 13.8*   INR 1.3*   APTT 41.7*     CRP: No results for input(s): CRP in the last 48 hours.  Prealbumin:   Recent Labs   Lab 08/06/20  1741   PREALBUMIN 7*     All pertinent labs within the past 24 hours have been reviewed.    Significant Imaging: I have reviewed all pertinent imaging results/findings.   AP pelvis:  Study limited by positioning, no obvious signs of osteomyelitis, soft tissue defect over left greater trochanter    Assessment/Plan:     Pressure injury of left hip, stage 4  Vaughn Retana is a 55 y.o. male with pressure ulcer just posterior to left greater trochanter.    -Does not appear grossly infected, no odor or drainage  - Pt will require evaluation by plastic surgery for this issue, plastics contacted.    -Pain control multimodal  -Abx: Vanc and Zosyn  -Labs: prealbumin 7, alb 1.2, transferrin 81, WBC 13   - Orthopedics will be available as needed  - Will discuss with staff          Rush Hubbard MD  Orthopedics  [facility]

## 2020-08-07 NOTE — ASSESSMENT & PLAN NOTE
Vaughn Retana is a 55 y.o. male with pressure ulcer just posterior to left greater trochanter.    -Does not appear grossly infected, no odor or drainage  - Pt will require evaluation by plastic surgery for this issue, plastics contacted.    -Pain control multimodal  -Abx: Vanc and Zosyn  -Labs: prealbumin 7, alb 1.2, transferrin 81, WBC 13   - Orthopedics will be available as needed  - Will discuss with staff

## 2020-08-07 NOTE — PROGRESS NOTES
Pharmacokinetic Assessment Follow Up: IV Vancomycin    Vancomycin serum concentration assessment(s):    The random level was drawn correctly and can be used to guide therapy at this time. The measurement is above the desired definitive target range of 15 to 20 mcg/mL.    Vancomycin Regimen Plan:    Re-dose when the random level is less than 20 mcg/mL, next level to be drawn at 0600 on 8/8    Drug levels (last 3 results):  Recent Labs   Lab Result Units 08/06/20  0515 08/07/20  0842   Vancomycin, Random ug/mL 26.4  --    Vancomycin-Trough ug/mL  --  26.2*       Pharmacy will continue to follow and monitor vancomycin.    Please contact pharmacy at extension [phone] for questions regarding this assessment.    Thank you for the consult,   Milena Gross       Patient brief summary:  Vaughn Retana is a 55 y.o. male initiated on antimicrobial therapy with IV Vancomycin for treatment of bacteremia    The patient's current regimen is pulse dosing vancomycin per levels.    Drug Allergies:   Review of patient's allergies indicates:   Allergen Reactions    Lanthanum Nausea And Vomiting     Nausea and vomiting  Nausea and vomiting       Actual Body Weight:   57.5 kg    Renal Function:   Estimated Creatinine Clearance: 17.7 mL/min (A) (based on SCr of 3.8 mg/dL (H)).,     Dialysis Method (if applicable):  intermittent HD, MWF    CBC (last 72 hours):  Recent Labs   Lab Result Units 08/05/20 0820 08/06/20  0515 08/07/20  0423   WBC K/uL 14.20* 13.07* 12.05   Hemoglobin g/dL 8.5* 8.4* 7.6*   Hematocrit % 27.6* 28.0* 24.3*   Platelets K/uL 301 249 SEE COMMENT   Gran% % 82.9* 81.6* 72.1   Lymph% % 6.2* 5.8* 9.3*   Mono% % 7.6 6.9 10.5   Eosinophil% % 2.4 5.0 6.1   Basophil% % 0.3 0.3 0.5   Differential Method  Automated Automated Automated       Metabolic Panel (last 72 hours):  Recent Labs   Lab Result Units 08/05/20  0820 08/06/20  0515 08/06/20  1741 08/07/20  0423   Sodium mmol/L 135* 137  --  135*   Potassium mmol/L 4.9 5.1   --  5.8*   Chloride mmol/L 101 101  --  102   CO2 mmol/L 24 20*  --  17*   Glucose mg/dL 146* 130*  --  165*   BUN, Bld mg/dL 51* 61*  --  81*   Creatinine mg/dL 2.6* 3.1*  --  3.8*   Albumin g/dL 1.3* 1.2*  --  1.2*   Total Bilirubin mg/dL 0.4 0.5  --  0.2   Alkaline Phosphatase U/L 151* 143*  --  139*   AST U/L 40 35  --  35   ALT U/L 9* 6*  --  7*   Magnesium mg/dL 1.7 1.7 1.7 1.9   Phosphorus mg/dL 3.5 5.5* 4.6* 5.0*       Vancomycin Administrations:  vancomycin given in the last 96 hours                   vancomycin in dextrose 5 % 1 gram/250 mL IVPB 1,000 mg (mg) 1,000 mg New Bag 08/05/20 1024                Microbiologic Results:  Microbiology Results (last 7 days)     Procedure Component Value Units Date/Time    Blood culture [[id number]]     Order Status: Sent Specimen: Blood     Blood culture [[id number]]     Order Status: Sent Specimen: Blood     Blood culture x two cultures. Draw prior to antibiotics. [[id number]] Collected: 08/05/20 0820    Order Status: Completed Specimen: Blood from Line, Subclavian, Left Updated: 08/07/20 0505     Blood Culture, Routine Gram stain miya bottle: Gram positive cocci in clusters resembling Staph       Results called to and read back by: Deb Hopkins RN 08/07/2020  05:05    Narrative:      Aerobic and anaerobic    Blood culture x two cultures. Draw prior to antibiotics. [[id number]] Collected: 08/05/20 0910    Order Status: Completed Specimen: Blood from Peripheral, Hand, Right Updated: 08/06/20 1213     Blood Culture, Routine No Growth to date      No Growth to date    Narrative:      Aerobic and anaerobic    Culture, Respiratory with Gram Stain [[id number]]     Order Status: No result Specimen: Sputum, Expectorated

## 2020-08-07 NOTE — PROGRESS NOTES
[facility] NOTE    Vaughn Retana is a 55 y.o. male currently on hemodialysis for removal of uremic toxins and volume.    Labs have been reviewed and the dialysate bath has been adjusted.    Labs:      Recent Labs   Lab 08/05/20 0820 08/06/20 0515 08/06/20  1741 08/07/20  0423   * 137  --  135*   K 4.9 5.1  --  5.8*    101  --  102   CO2 24 20*  --  17*   BUN 51* 61*  --  81*   CREATININE 2.6* 3.1*  --  3.8*   CALCIUM 9.1 9.6  --  9.4   PHOS 3.5 5.5* 4.6* 5.0*       Recent Labs   Lab 08/05/20 0820 08/06/20  0515 08/07/20  0423   WBC 14.20* 13.07* 12.05   HGB 8.5* 8.4* 7.6*   HCT 27.6* 28.0* 24.3*    249 SEE COMMENT       Assessment/Plan:    HD today for removal of uremic toxins and volume. B's stable on HD.  Uf of 1 l as dario.  Potassium elevated-low K diet.  Phos stable.

## 2020-08-07 NOTE — PLAN OF CARE
08/07/20 0853   Post-Acute Status   Post-Acute Authorization Placement   Post-Acute Placement Status Awaiting Internal Medical Clearance

## 2020-08-07 NOTE — ASSESSMENT & PLAN NOTE
Give vancomycin. Likely entered via one of his decubitus wounds. Repeat blood cultures. Transthoracic echocardiogram to evaluate for vegetations. Follow up culture results.

## 2020-08-07 NOTE — PROGRESS NOTES
HD treatment complete. Duration of treatment 3.5 hours and 1 L removed. Treatment was tolerated well and no complications with access to right upper arm. Needles removed and hemostasis achieved. Dressing intact and no drainage noted. Thrill and bruit present.

## 2020-08-07 NOTE — PROGRESS NOTES
[facility] Hospital Medicine  Progress Note    Patient Name: Vaughn Retana  MRN: [id number]  Patient Class: IP- Inpatient   Admission Date: 8/5/2020  Length of Stay: 2 days  Attending Physician: Jono Hutchinson MD  Primary Care Provider: Cori Randall MD    [place] [facility] Medicine Team: [facility] HOSP MED D Jono Hutchinson MD    Subjective:     Principal Problem:Septicemia due to Staphylococcus        HPI:  Vaughn Retana is a 55 year old Black man with renovascular hypertension, diabetes mellitus type 2 with nephropathy, neuropathy, gastroparesis, peripheral vascular disease, status post left foot metatarsal foot amputation in 2010, status post left below-the-knee amputation on 12/18/2013 due to osteomyelitis, hepatitis C, end stage renal disease on hemodialysis (Monday Wednesday Friday), hypotension associated with dialysis (on midodrine), history of left basal ganglia intracranial hemorrhage on 5/6/2013, right basal ganglia hemorrhage on 9/2/2016, right basal ganglia hemorrhage on 11/2/2016, left caudate head hemorrage on 2/22/2017, aphasia, dysphagia status post gastrostomy placement, seizure disorder, chronic diastolic heart failure, anemia, gastroesophageal reflux disease, history of gastrointestinal bleeding (esophagitis on esophagogastroduodenoscopy on 11/12/2019), decubitus ulcers, history of COVID-19 infection on 3/25/2020. He lives at [facility] in [place], [place]. His primary care physician is Dr. Cori Randall.               He was hospitalized at [facility] and discharged to [facility] from 6/27/2020 to 8/4/2020 for culture-negative endocarditis and urinary tract infection.               He presented to [facility] Department the next day with tachycardia, which kept him from getting his routine dialysis. In the emergency department, he was also found to have fever of 102.2° Fahrenheit. He had no  hypoxia. Labs showed leukocytosis (WBC count 56764, from 52939 on 8/3/2020). BNP was elevated at 1748 pg/mL. Troponin was elevated at 0.168 ng/mL. COVID-19 test was negative. Chest X-ray showed new bilateral reticulonodular opacities. He was given piperacillin-tazobactam and vancomycin. He was admitted to Hospital Medicine Team D.    Overview/Hospital Course:  Antibiotics were continued. He was noted to have a stage IV sacral decubitus ulcer, unstageable right leg ulcer, stage III right ankle ulcer, stage III right fourth toe ulcer, right fifth toe ulcer, unstageable right heel ulcer, and dressings to right buttock and hip, right knee, and left below-knee amputation stump. Wound Care was consulted and noted a stage IV left hip decubitus ulcer. Orthopedic Surgery was consulted and did not note any odor or drainage to suggest infection of the left hip ulcer. On 8/7/2020, one of the blood cultures taken on admission grew Staphylococcus.     Interval History: Blood culture taken on admission grew Staphylococcus.    Review of Systems   Unable to perform ROS: Patient nonverbal     Objective:     Vital Signs (Most Recent):  Temp: 98.3 °F (36.8 °C) (08/07/20 1143)  Pulse: 95 (08/07/20 1143)  Resp: 16 (08/07/20 1143)  BP: 125/72 (08/07/20 1143)  SpO2: 98 % (08/07/20 1143) Vital Signs (24h Range):  Temp:  [98.2 °F (36.8 °C)-102.9 °F (39.4 °C)] 98.3 °F (36.8 °C)  Pulse:  [] 95  Resp:  [16-18] 16  SpO2:  [92 %-98 %] 98 %  BP: (103-125)/(57-72) 125/72     Weight: 57.5 kg (126 lb 12.2 oz)  Body mass index is 20.46 kg/m².    Intake/Output Summary (Last 24 hours) at 8/7/2020 1246  Last data filed at 8/7/2020 0320  Gross per 24 hour   Intake 380 ml   Output --   Net 380 ml      Physical Exam  Vitals signs and nursing note reviewed.   Constitutional:       General: He is sleeping. He is not in acute distress.  Cardiovascular:      Rate and Rhythm: Regular rhythm. Tachycardia present.   Pulmonary:      Effort: Pulmonary effort  is normal. No respiratory distress.   Neurological:      Motor: No tremor or seizure activity.         Significant Labs: All pertinent labs within the past 24 hours have been reviewed.    Significant Imaging: I have reviewed all pertinent imaging results/findings within the past 24 hours.       Assessment/Plan:      * Septicemia due to Staphylococcus  Give vancomycin. Likely entered via one of his decubitus wounds. Repeat blood cultures. Transthoracic echocardiogram to evaluate for vegetations. Follow up culture results.    Pressure injury of left hip, stage 4  Seen by Orthopedic Surgery, who felt Plastic Surgery is needed.    Nursing home resident  Return to [facility] when medically stable.    End-stage renal disease on hemodialysis  Dialysis per Nephrology.    Gastroparesis  Continue home metoclopramide.    Hemodialysis-associated hypotension  History of renal hypertension  No longer on antihypertensive medications. Continue home midodrine as needed.    History of GI bleed  Continue home pantoprazole.    Chronic diastolic heart failure  Fluid removal with dialysis.    History of Intracerebral Hemorrhage: L BG 5/2013; R BG 9/2016; R BG 11/2016; L caudate head 2/2017  Gastrostomy status  Aphasia  Seizure disorder as sequela of cerebrovascular accident  Continue home levetiracetam. Turn every 2 hours to avoid new decubitus ulcers.    History of left below knee amputation 12/18/13  Turn patient every 2 hours.    Peripheral vascular disease in diabetes mellitus  Not on medications.    Dyslipidemia  Not on medications.    Anemia in chronic kidney disease  Stable.      VTE Risk Mitigation (From admission, onward)         Ordered     IP VTE HIGH RISK PATIENT  Once      08/05/20 1446     Place sequential compression device  Until discontinued      08/05/20 1446                Disposition: Back to [facility] next week. Still working up bacteremia and awaiting culture results.      Jono Hutchinson,  MD  Department of Hospital Medicine   [facility]

## 2020-08-07 NOTE — SUBJECTIVE & OBJECTIVE
Interval History: Blood culture taken on admission grew Staphylococcus.    Review of Systems   Unable to perform ROS: Patient nonverbal     Objective:     Vital Signs (Most Recent):  Temp: 98.3 °F (36.8 °C) (08/07/20 1143)  Pulse: 95 (08/07/20 1143)  Resp: 16 (08/07/20 1143)  BP: 125/72 (08/07/20 1143)  SpO2: 98 % (08/07/20 1143) Vital Signs (24h Range):  Temp:  [98.2 °F (36.8 °C)-102.9 °F (39.4 °C)] 98.3 °F (36.8 °C)  Pulse:  [] 95  Resp:  [16-18] 16  SpO2:  [92 %-98 %] 98 %  BP: (103-125)/(57-72) 125/72     Weight: 57.5 kg (126 lb 12.2 oz)  Body mass index is 20.46 kg/m².    Intake/Output Summary (Last 24 hours) at 8/7/2020 1246  Last data filed at 8/7/2020 0320  Gross per 24 hour   Intake 380 ml   Output --   Net 380 ml      Physical Exam  Vitals signs and nursing note reviewed.   Constitutional:       General: He is sleeping. He is not in acute distress.  Cardiovascular:      Rate and Rhythm: Regular rhythm. Tachycardia present.   Pulmonary:      Effort: Pulmonary effort is normal. No respiratory distress.   Neurological:      Motor: No tremor or seizure activity.         Significant Labs: All pertinent labs within the past 24 hours have been reviewed.    Significant Imaging: I have reviewed all pertinent imaging results/findings within the past 24 hours.

## 2020-08-08 NOTE — SUBJECTIVE & OBJECTIVE
Interval History: Staphylococcus in blood culture reported as likely contaminant. No other positive blood cultures. Febrile overnight.    Review of Systems   Unable to perform ROS: Patient nonverbal     Objective:     Vital Signs (Most Recent):  Temp: 97.5 °F (36.4 °C) (08/08/20 0850)  Pulse: 105 (08/08/20 0850)  Resp: 20 (08/08/20 0319)  BP: 116/64 (08/08/20 0850)  SpO2: 99 % (08/08/20 0850) Vital Signs (24h Range):  Temp:  [97.5 °F (36.4 °C)-100.9 °F (38.3 °C)] 97.5 °F (36.4 °C)  Pulse:  [] 105  Resp:  [16-24] 20  SpO2:  [94 %-99 %] 99 %  BP: (109-133)/(55-73) 116/64     Weight: 57.5 kg (126 lb 12.2 oz)  Body mass index is 20.46 kg/m².    Intake/Output Summary (Last 24 hours) at 8/8/2020 1051  Last data filed at 8/7/2020 1710  Gross per 24 hour   Intake 600 ml   Output 1610 ml   Net -1010 ml      Physical Exam  Vitals signs and nursing note reviewed.   Constitutional:       General: He is not in acute distress.     Appearance: He is not diaphoretic.   Cardiovascular:      Rate and Rhythm: Regular rhythm. Tachycardia present.   Pulmonary:      Effort: Pulmonary effort is normal. No respiratory distress.   Neurological:      Motor: No tremor or seizure activity.         Significant Labs: All pertinent labs within the past 24 hours have been reviewed.    Significant Imaging: I have reviewed all pertinent imaging results/findings within the past 24 hours.

## 2020-08-08 NOTE — NURSING
Pt have been febrile throughout shift. Pt given tyenol 650 mg x 2 and ineffective. Team D MD Arzate aware. Pt have new orders for piperacillin 4.5 mg q 8 hours. I will continue to monitor

## 2020-08-08 NOTE — ASSESSMENT & PLAN NOTE
Giving piperacillin-tazobactam and vancomycin. Positive blood culture likely contaminant. Unable to obtain sputum culture so continue empiric antibiotics until fever resolves, then can deescalate.

## 2020-08-08 NOTE — PLAN OF CARE
Problem: Wound  Goal: Optimal Wound Healing  Outcome: Ongoing, Progressing     Problem: Fall Injury Risk  Goal: Absence of Fall and Fall-Related Injury  Outcome: Ongoing, Progressing     Problem: Skin Injury Risk Increased  Goal: Skin Health and Integrity  Outcome: Ongoing, Progressing     Problem: Adult Inpatient Plan of Care  Goal: Plan of Care Review  Outcome: Ongoing, Progressing  Goal: Patient-Specific Goal (Individualization)  Outcome: Ongoing, Progressing  Goal: Absence of Hospital-Acquired Illness or Injury  Outcome: Ongoing, Progressing  Goal: Optimal Comfort and Wellbeing  Outcome: Ongoing, Progressing  Goal: Readiness for Transition of Care  Outcome: Ongoing, Progressing  Goal: Rounds/Family Conference  Outcome: Ongoing, Progressing     Problem: Diabetes Comorbidity  Goal: Blood Glucose Level Within Desired Range  Outcome: Ongoing, Progressing     Problem: Adjustment to Illness (Sepsis/Septic Shock)  Goal: Optimal Coping  Outcome: Ongoing, Progressing     Problem: Bleeding (Sepsis/Septic Shock)  Goal: Absence of Bleeding  Outcome: Ongoing, Progressing     Problem: Glycemic Control Impaired (Sepsis/Septic Shock)  Goal: Blood Glucose Level Within Desired Range  Outcome: Ongoing, Progressing     Problem: Hemodynamic Instability (Sepsis/Septic Shock)  Goal: Effective Tissue Perfusion  Outcome: Ongoing, Progressing     Problem: Infection (Sepsis/Septic Shock)  Goal: Absence of Infection Signs/Symptoms  Outcome: Ongoing, Progressing     Problem: Nutrition Impaired (Sepsis/Septic Shock)  Goal: Optimal Nutrition Intake  Outcome: Ongoing, Progressing     Problem: Respiratory Compromise (Sepsis/Septic Shock)  Goal: Effective Oxygenation and Ventilation  Outcome: Ongoing, Progressing     Problem: Fluid Imbalance (Pneumonia)  Goal: Fluid Balance  Outcome: Ongoing, Progressing     Problem: Infection (Pneumonia)  Goal: Resolution of Infection Signs/Symptoms  Outcome: Ongoing, Progressing     Problem: Respiratory  Compromise (Pneumonia)  Goal: Effective Oxygenation and Ventilation  Outcome: Ongoing, Progressing     Problem: Device-Related Complication Risk (Hemodialysis)  Goal: Safe, Effective Therapy Delivery  Outcome: Ongoing, Progressing     Problem: Hemodynamic Instability (Hemodialysis)  Goal: Vital Signs Remain in Desired Range  Outcome: Ongoing, Progressing     Problem: Infection (Hemodialysis)  Goal: Absence of Infection Signs/Symptoms  Outcome: Ongoing, Progressing      SEE note

## 2020-08-08 NOTE — PROGRESS NOTES
[facility] Hospital Medicine  Progress Note    Patient Name: Vaughn Retana  MRN: [id number]  Patient Class: IP- Inpatient   Admission Date: 8/5/2020  Length of Stay: 3 days  Attending Physician: Jono Hutchinson MD  Primary Care Provider: Cori Randall MD    [facility] Medicine Team: [facility]OSP MED D Jono Hutchinson MD    Subjective:     Principal Problem:Sepsis due to pneumonia        HPI:  Vaughn Retana is a 55 year old Black man with renovascular hypertension, diabetes mellitus type 2 with nephropathy, neuropathy, gastroparesis, peripheral vascular disease, status post left foot metatarsal foot amputation in 2010, status post left below-the-knee amputation on 12/18/2013 due to osteomyelitis, hepatitis C, end stage renal disease on hemodialysis (Monday Wednesday Friday), hypotension associated with dialysis (on midodrine), history of left basal ganglia intracranial hemorrhage on 5/6/2013, right basal ganglia hemorrhage on 9/2/2016, right basal ganglia hemorrhage on 11/2/2016, left caudate head hemorrage on 2/22/2017, aphasia, dysphagia status post gastrostomy placement, seizure disorder, chronic diastolic heart failure, anemia, gastroesophageal reflux disease, history of gastrointestinal bleeding (esophagitis on esophagogastroduodenoscopy on 11/12/2019), decubitus ulcers, history of COVID-19 infection on 3/25/2020. He lives at [facility] in [place], [place]. His primary care physician is Dr. Cori Randall.               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               He presented to [facility] Emergency Department the next day with tachycardia, which kept him from getting his routine dialysis. In the emergency department, he was also found to have fever of 102.2° Fahrenheit. He had no hypoxia.  Labs showed leukocytosis (WBC count 45744, from 93071 on 8/3/2020). BNP was elevated at 1748 pg/mL. Troponin was elevated at 0.168 ng/mL. COVID-19 test was negative. Chest X-ray showed new bilateral reticulonodular opacities. He was given piperacillin-tazobactam and vancomycin. He was admitted to Hospital Medicine Team D.    Overview/Hospital Course:  Antibiotics were continued. He was noted to have a stage IV sacral decubitus ulcer, unstageable right leg ulcer, stage III right ankle ulcer, stage III right fourth toe ulcer, right fifth toe ulcer, unstageable right heel ulcer, and dressings to right buttock and hip, right knee, and left below-knee amputation stump. Wound Care was consulted and noted a stage IV left hip decubitus ulcer. Orthopedic Surgery was consulted and did not note any odor or drainage to suggest infection of the left hip ulcer. On 8/7/2020, one of the blood cultures taken on admission grew coagulase-negative Staphylococcus, but it appeared to be a contaminant. Fevers persisted.     Interval History: Staphylococcus in blood culture reported as likely contaminant. No other positive blood cultures. Febrile overnight.    Review of Systems   Unable to perform ROS: Patient nonverbal     Objective:     Vital Signs (Most Recent):  Temp: 97.5 °F (36.4 °C) (08/08/20 0850)  Pulse: 105 (08/08/20 0850)  Resp: 20 (08/08/20 0319)  BP: 116/64 (08/08/20 0850)  SpO2: 99 % (08/08/20 0850) Vital Signs (24h Range):  Temp:  [97.5 °F (36.4 °C)-100.9 °F (38.3 °C)] 97.5 °F (36.4 °C)  Pulse:  [] 105  Resp:  [16-24] 20  SpO2:  [94 %-99 %] 99 %  BP: (109-133)/(55-73) 116/64     Weight: 57.5 kg (126 lb 12.2 oz)  Body mass index is 20.46 kg/m².    Intake/Output Summary (Last 24 hours) at 8/8/2020 1051  Last data filed at 8/7/2020 1710  Gross per 24 hour   Intake 600 ml   Output 1610 ml   Net -1010 ml      Physical Exam  Vitals signs and nursing note reviewed.   Constitutional:       General: He is not in acute  distress.     Appearance: He is not diaphoretic.   Cardiovascular:      Rate and Rhythm: Regular rhythm. Tachycardia present.   Pulmonary:      Effort: Pulmonary effort is normal. No respiratory distress.   Neurological:      Motor: No tremor or seizure activity.         Significant Labs: All pertinent labs within the past 24 hours have been reviewed.    Significant Imaging: I have reviewed all pertinent imaging results/findings within the past 24 hours.       Assessment/Plan:      * Sepsis due to pneumonia  Giving piperacillin-tazobactam and vancomycin. Positive blood culture likely contaminant. Unable to obtain sputum culture so continue empiric antibiotics until fever resolves, then can deescalate.    Pressure injury of left hip, stage 4  Seen by Orthopedic Surgery, who felt Plastic Surgery is needed.    Nursing home resident  Return to [facility] when medically stable.    End-stage renal disease on hemodialysis  Dialysis per Nephrology.    Gastroparesis  Continue home metoclopramide.    Hemodialysis-associated hypotension  History of renal hypertension  No longer on antihypertensive medications. Continue home midodrine as needed.    History of GI bleed  Continue home pantoprazole.    Chronic diastolic heart failure  Fluid removal with dialysis.    History of Intracerebral Hemorrhage: L BG 5/2013; R BG 9/2016; R BG 11/2016; L caudate head 2/2017  Gastrostomy status  Aphasia  Seizure disorder as sequela of cerebrovascular accident  Continue home levetiracetam. Turn every 2 hours to avoid new decubitus ulcers.    History of left below knee amputation 12/18/13  Turn patient every 2 hours.    Peripheral vascular disease in diabetes mellitus  Not on medications.    Dyslipidemia  Not on medications.    Anemia in chronic kidney disease  Stable.      VTE Risk Mitigation (From admission, onward)         Ordered     IP VTE HIGH RISK PATIENT  Once      08/05/20 1446     Place sequential compression device  Until  discontinued      08/05/20 1446                      Jono Hutchinson MD  Department of Hospital Medicine   [facility]

## 2020-08-08 NOTE — PROGRESS NOTES
Pharmacokinetic Assessment Follow Up: IV Vancomycin    Vancomycin Assessment/Plan:  - ESRD pt on iHD (MWF outpt) -- last iHD session was Fri, 8/7  - Vanc random this AM 18.3 mcg/mL  - Vanc 750 mg x1 dose today 8/8   - Next vanc random ordered w/ AM labs on Mon, 8/10 prior to next anticipated HD session  - Pharmacy to cont to monitor vanc levels/HD plans and re-dose when necessary (<20mcg/mL)    Drug levels (last 3 results):  Recent Labs   Lab Result Units 08/06/20  0515 08/07/20  0842 08/08/20  0932   Vancomycin, Random ug/mL 26.4  --   --    Vancomycin-Trough ug/mL  --  26.2* 18.3       Pharmacy will continue to follow and monitor vancomycin.    Please contact pharmacy at extension [phone] for questions regarding this assessment.    Thank you for the consult,   Mis Arenas       Patient brief summary:  Vaughn Retana is a 55 y.o. male initiated on antimicrobial therapy with IV Vancomycin for treatment of bacteremia    Drug Allergies:   Review of patient's allergies indicates:   Allergen Reactions    Lanthanum Nausea And Vomiting     Nausea and vomiting  Nausea and vomiting       Actual Body Weight:   57.5 kg    Renal Function:   Estimated Creatinine Clearance: 29.2 mL/min (A) (based on SCr of 2.3 mg/dL (H)).,     Dialysis Method (if applicable):  intermittent HD    CBC (last 72 hours):  Recent Labs   Lab Result Units 08/06/20  0515 08/07/20  0423 08/08/20  0445   WBC K/uL 13.07* 12.05 12.25   Hemoglobin g/dL 8.4* 7.6* 7.5*   Hematocrit % 28.0* 24.3* 26.2*   Platelets K/uL 249 SEE COMMENT 178   Gran% % 81.6* 72.1 78.1*   Lymph% % 5.8* 9.3* 7.2*   Mono% % 6.9 10.5 8.3   Eosinophil% % 5.0 6.1 5.6   Basophil% % 0.3 0.5 0.3   Differential Method  Automated Automated Automated       Metabolic Panel (last 72 hours):  Recent Labs   Lab Result Units 08/06/20  0515 08/06/20  1741 08/07/20  0423 08/08/20  0445   Sodium mmol/L 137  --  135* 136   Potassium mmol/L 5.1  --  5.8* 3.8   Chloride mmol/L 101  --  102 101    CO2 mmol/L 20*  --  17* 24   Glucose mg/dL 130*  --  165* 181*   BUN, Bld mg/dL 61*  --  81* 33*   Creatinine mg/dL 3.1*  --  3.8* 2.3*   Albumin g/dL 1.2*  --  1.2* 1.2*   Total Bilirubin mg/dL 0.5  --  0.2 0.3   Alkaline Phosphatase U/L 143*  --  139* 136*   AST U/L 35  --  35 31   ALT U/L 6*  --  7* 5*   Magnesium mg/dL 1.7 1.7 1.9 1.7   Phosphorus mg/dL 5.5* 4.6* 5.0* 3.2       Vancomycin Administrations:  vancomycin given in the last 96 hours                   vancomycin in dextrose 5 % 1 gram/250 mL IVPB 1,000 mg (mg) 1,000 mg New Bag 08/05/20 1024                Microbiologic Results:  Microbiology Results (last 7 days)     Procedure Component Value Units Date/Time    Blood culture x two cultures. Draw prior to antibiotics. [[id number]]  (Abnormal) Collected: 08/05/20 0820    Order Status: Completed Specimen: Blood from Line, Subclavian, Left Updated: 08/08/20 0902     Blood Culture, Routine Gram stain miya bottle: Gram positive cocci in clusters resembling Staph       Results called to and read back by: Deb Hopkins RN 08/07/2020  05:05      Gram stain aer bottle: Gram positive cocci in clusters resembling Staph       Positive results previously called 08/07/2020  15:50      COAGULASE-NEGATIVE STAPHYLOCOCCUS SPECIES  Organism is a probable contaminant      Narrative:      Aerobic and anaerobic    Blood culture [[id number]] Collected: 08/07/20 1124    Order Status: Completed Specimen: Blood Updated: 08/07/20 1915     Blood Culture, Routine No Growth to date    Narrative:      Collection has been rescheduled by DW1 at 08/07/2020 10:40 Reason:   wound care is working with patient   Collection has been rescheduled by DW1 at 08/07/2020 10:40 Reason:   wound care is working with patient     Blood culture [[id number]] Collected: 08/07/20 1125    Order Status: Completed Specimen: Blood from Antecubital, Left Arm Updated: 08/07/20 1915     Blood Culture, Routine No Growth to date    Narrative:      Collection has  been rescheduled by DW1 at 08/07/2020 10:40 Reason:   wound care is working with patient   Collection has been rescheduled by DW1 at 08/07/2020 10:40 Reason:   wound care is working with patient     Blood culture x two cultures. Draw prior to antibiotics. [[id number]] Collected: 08/05/20 0910    Order Status: Completed Specimen: Blood from Peripheral, Hand, Right Updated: 08/07/20 1213     Blood Culture, Routine No Growth to date      No Growth to date      No Growth to date    Narrative:      Aerobic and anaerobic    Culture, Respiratory with Gram Stain [[id number]]     Order Status: No result Specimen: Sputum, Expectorated

## 2020-08-09 NOTE — ASSESSMENT & PLAN NOTE
Giving piperacillin-tazobactam. Deescalate antibiotics by discontinuing vancomycin. Sepsis resolving.

## 2020-08-09 NOTE — PROGRESS NOTES
[facility] Hospital Medicine  Progress Note    Patient Name: Vaughn Retana  MRN: [id number]  Patient Class: IP- Inpatient   Admission Date: 8/5/2020  Length of Stay: 4 days  Attending Physician: Jono Hutchinson MD  Primary Care Provider: Cori Randall MD    [facility] Medicine Team: [facility]OSP MED D Jono Hutchinson MD    Subjective:     Principal Problem:Sepsis due to pneumonia        HPI:  Vaughn Retana is a 55 year old Black man with renovascular hypertension, diabetes mellitus type 2 with nephropathy, neuropathy, gastroparesis, peripheral vascular disease, status post left foot metatarsal foot amputation in 2010, status post left below-the-knee amputation on 12/18/2013 due to osteomyelitis, hepatitis C, end stage renal disease on hemodialysis (Monday Wednesday Friday), hypotension associated with dialysis (on midodrine), history of left basal ganglia intracranial hemorrhage on 5/6/2013, right basal ganglia hemorrhage on 9/2/2016, right basal ganglia hemorrhage on 11/2/2016, left caudate head hemorrage on 2/22/2017, aphasia, dysphagia status post gastrostomy placement, seizure disorder, chronic diastolic heart failure, anemia, gastroesophageal reflux disease, history of gastrointestinal bleeding (esophagitis on esophagogastroduodenoscopy on 11/12/2019), decubitus ulcers, history of COVID-19 infection on 3/25/2020. He lives at [facility] in [place], [place]. His primary care physician is Dr. Cori Randall.               He was hospitalized at 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               He presented to [facility] Emergency Department the next day with tachycardia, which kept him from getting his routine dialysis. In the emergency department, he was also found to have fever of 102.2° Fahrenheit. He had no hypoxia.  Labs showed leukocytosis (WBC count 15946, from 32819 on 8/3/2020). BNP was elevated at 1748 pg/mL. Troponin was elevated at 0.168 ng/mL. COVID-19 test was negative. Chest X-ray showed new bilateral reticulonodular opacities. He was given piperacillin-tazobactam and vancomycin. He was admitted to Hospital Medicine Team D.    Overview/Hospital Course:  Antibiotics were continued. He was noted to have a stage IV sacral decubitus ulcer, unstageable right leg ulcer, stage III right ankle ulcer, stage III right fourth toe ulcer, right fifth toe ulcer, unstageable right heel ulcer, and dressings to right buttock and hip, right knee, and left below-knee amputation stump. Wound Care was consulted and noted a stage IV left hip decubitus ulcer. Orthopedic Surgery was consulted and did not note any odor or drainage to suggest infection of the left hip ulcer. On 8/7/2020, one of the blood cultures taken on admission grew coagulase-negative Staphylococcus, but it appeared to be a contaminant. Fevers persisted then subsided. He started coughing up thick sputum.     Interval History: Fevers resolved. Coughing up sputum.    Review of Systems   Unable to perform ROS: Patient nonverbal     Objective:     Vital Signs (Most Recent):  Temp: 99.2 °F (37.3 °C) (08/09/20 0845)  Pulse: 106 (08/09/20 0845)  Resp: 19 (08/09/20 0845)  BP: 126/68 (08/09/20 0845)  SpO2: 98 % (08/09/20 0845) Vital Signs (24h Range):  Temp:  [97.3 °F (36.3 °C)-99.2 °F (37.3 °C)] 99.2 °F (37.3 °C)  Pulse:  [] 106  Resp:  [18-20] 19  SpO2:  [98 %-100 %] 98 %  BP: (126-148)/(68-80) 126/68     Weight: 57.2 kg (126 lb)  Body mass index is 20.34 kg/m².    Intake/Output Summary (Last 24 hours) at 8/9/2020 1140  Last data filed at 8/8/2020 2000  Gross per 24 hour   Intake 300 ml   Output --   Net 300 ml      Physical Exam  Vitals signs and nursing note reviewed.   Constitutional:       General: He is not in acute distress.     Appearance: He is not diaphoretic.    Cardiovascular:      Rate and Rhythm: Regular rhythm. Tachycardia present.   Pulmonary:      Effort: Pulmonary effort is normal. No respiratory distress.   Neurological:      Motor: No tremor or seizure activity.         Significant Labs: All pertinent labs within the past 24 hours have been reviewed.    Significant Imaging: I have reviewed all pertinent imaging results/findings within the past 24 hours.       Assessment/Plan:      * Sepsis due to pneumonia  Giving piperacillin-tazobactam. Deescalate antibiotics by discontinuing vancomycin. Sepsis resolving.    Pressure injury of left hip, stage 4  Plastic Surgery planning debridement when they have time.    Nursing home resident  Return to [facility] when medically stable.    End-stage renal disease on hemodialysis  Dialysis per Nephrology.    Gastroparesis  Continue home metoclopramide.    Hemodialysis-associated hypotension  History of renal hypertension  No longer on antihypertensive medications. Continue home midodrine as needed.    History of GI bleed  Continue home pantoprazole.    Chronic diastolic heart failure  Fluid removal with dialysis.    History of Intracerebral Hemorrhage: L BG 5/2013; R BG 9/2016; R BG 11/2016; L caudate head 2/2017  Gastrostomy status  Aphasia  Seizure disorder as sequela of cerebrovascular accident  Continue home levetiracetam. Turn every 2 hours to avoid new decubitus ulcers.    History of left below knee amputation 12/18/13  Turn patient every 2 hours.    Peripheral vascular disease in diabetes mellitus  Not on medications.    Dyslipidemia  Not on medications.    Anemia in chronic kidney disease  Stable.      VTE Risk Mitigation (From admission, onward)         Ordered     IP VTE HIGH RISK PATIENT  Once      08/05/20 1446     Place sequential compression device  Until discontinued      08/05/20 1446                      Jono Hutchinson MD  Department of Hospital Medicine   [facility]

## 2020-08-09 NOTE — SUBJECTIVE & OBJECTIVE
Interval History: Fevers resolved. Coughing up sputum.    Review of Systems   Unable to perform ROS: Patient nonverbal     Objective:     Vital Signs (Most Recent):  Temp: 99.2 °F (37.3 °C) (08/09/20 0845)  Pulse: 106 (08/09/20 0845)  Resp: 19 (08/09/20 0845)  BP: 126/68 (08/09/20 0845)  SpO2: 98 % (08/09/20 0845) Vital Signs (24h Range):  Temp:  [97.3 °F (36.3 °C)-99.2 °F (37.3 °C)] 99.2 °F (37.3 °C)  Pulse:  [] 106  Resp:  [18-20] 19  SpO2:  [98 %-100 %] 98 %  BP: (126-148)/(68-80) 126/68     Weight: 57.2 kg (126 lb)  Body mass index is 20.34 kg/m².    Intake/Output Summary (Last 24 hours) at 8/9/2020 1140  Last data filed at 8/8/2020 2000  Gross per 24 hour   Intake 300 ml   Output --   Net 300 ml      Physical Exam  Vitals signs and nursing note reviewed.   Constitutional:       General: He is not in acute distress.     Appearance: He is not diaphoretic.   Cardiovascular:      Rate and Rhythm: Regular rhythm. Tachycardia present.   Pulmonary:      Effort: Pulmonary effort is normal. No respiratory distress.   Neurological:      Motor: No tremor or seizure activity.         Significant Labs: All pertinent labs within the past 24 hours have been reviewed.    Significant Imaging: I have reviewed all pertinent imaging results/findings within the past 24 hours.

## 2020-08-09 NOTE — PLAN OF CARE
Problem: Wound  Goal: Optimal Wound Healing  Outcome: Ongoing, Progressing     Problem: Fall Injury Risk  Goal: Absence of Fall and Fall-Related Injury  Outcome: Ongoing, Progressing     Problem: Skin Injury Risk Increased  Goal: Skin Health and Integrity  Outcome: Ongoing, Progressing     Problem: Adult Inpatient Plan of Care  Goal: Plan of Care Review  Outcome: Ongoing, Progressing  Goal: Patient-Specific Goal (Individualization)  Outcome: Ongoing, Progressing  Goal: Absence of Hospital-Acquired Illness or Injury  Outcome: Ongoing, Progressing  Goal: Optimal Comfort and Wellbeing  Outcome: Ongoing, Progressing  Goal: Readiness for Transition of Care  Outcome: Ongoing, Progressing  Goal: Rounds/Family Conference  Outcome: Ongoing, Progressing     Problem: Diabetes Comorbidity  Goal: Blood Glucose Level Within Desired Range  Outcome: Ongoing, Progressing     Problem: Adjustment to Illness (Sepsis/Septic Shock)  Goal: Optimal Coping  Outcome: Ongoing, Progressing     Problem: Bleeding (Sepsis/Septic Shock)  Goal: Absence of Bleeding  Outcome: Ongoing, Progressing     Problem: Glycemic Control Impaired (Sepsis/Septic Shock)  Goal: Blood Glucose Level Within Desired Range  Outcome: Ongoing, Progressing     Problem: Hemodynamic Instability (Sepsis/Septic Shock)  Goal: Effective Tissue Perfusion  Outcome: Ongoing, Progressing     Problem: Infection (Sepsis/Septic Shock)  Goal: Absence of Infection Signs/Symptoms  Outcome: Ongoing, Progressing     Problem: Nutrition Impaired (Sepsis/Septic Shock)  Goal: Optimal Nutrition Intake  Outcome: Ongoing, Progressing     Problem: Respiratory Compromise (Sepsis/Septic Shock)  Goal: Effective Oxygenation and Ventilation  Outcome: Ongoing, Progressing     Problem: Fluid Imbalance (Pneumonia)  Goal: Fluid Balance  Outcome: Ongoing, Progressing     Problem: Infection (Pneumonia)  Goal: Resolution of Infection Signs/Symptoms  Outcome: Ongoing, Progressing     Problem: Respiratory  Compromise (Pneumonia)  Goal: Effective Oxygenation and Ventilation  Outcome: Ongoing, Progressing     Problem: Device-Related Complication Risk (Hemodialysis)  Goal: Safe, Effective Therapy Delivery  Outcome: Ongoing, Progressing     Problem: Hemodynamic Instability (Hemodialysis)  Goal: Vital Signs Remain in Desired Range  Outcome: Ongoing, Progressing     Problem: Infection (Hemodialysis)  Goal: Absence of Infection Signs/Symptoms  Outcome: Ongoing, Progressing       Plan of care reviewed with patient. Tele monitoring and continuous pulse ox remains in place . Vs remains stable .  Pt has a productive cough and is expelling thick secretions. Suctioning performed prn.   No resp distress noted. Nova source enteral feeding is infusing at 35 ml/hr. Placement checked and 0 residual was noted. Pt is repositioned q 2 hours. Wound care completed to wounds.  Pt will be having surgical debridement to wounds today.  Pt is free of falls and injuries. Bed in lowest position and call light within reach . I will continue to monitor

## 2020-08-09 NOTE — PLAN OF CARE
Pt alert and oriented x 2 and able to nod appropriately at times.  He currently receives two antibiotics, Zosyn and Vancomycin.  Also receives wound care twice daily, once per shift.  Pt needs to be turned and positioned q 2 hours, he is also very contracted of the right leg and unable to bring that leg down.  Towels and pillows used to cushion in between leg and right arm.  Orders for operating room-irrigation and debridement of wounds.  Otherwise, no major events during day shift.

## 2020-08-09 NOTE — PROGRESS NOTES
Therapy with vanc complete and/or consult discontinued by provider.  Pharmacy will sign off, please re-consult as needed.    Thanks for the consult  Ann Arenas PharmD, [facility]  Clinical Pharmacist  Spectra Link: [phone]

## 2020-08-10 NOTE — PLAN OF CARE
08/10/20 0933   Post-Acute Status   Post-Acute Authorization Placement   Post-Acute Placement Status Awaiting Internal Medical Clearance

## 2020-08-10 NOTE — ASSESSMENT & PLAN NOTE
Giving piperacillin-tazobactam, which has good coverage of bacterial pneumonia. Fevers and WBC count fluctuating. Just stopped vancomycin, so do not feel that one day off of it would cause worsening, and would prefer not to have to depend on vancomycin for pneumonia without specific indication. Sputum culture is unable to be obtained due to inability to cooperate. Add doxycycline for empiric MRSA coverage. Add guaifenesin to help him cough up mucus.

## 2020-08-10 NOTE — CARE UPDATE
Rapid Response Nurse Chart Check     Chart check completed, abnormal VS noted. Pt received PRN tylenol for todd 102. Call [phone] for further concerns or assistance.

## 2020-08-10 NOTE — PLAN OF CARE
Pt alert and oriented x 1-2 today.  He has had low grade temperatures today during the day but never went over 99 degrees. Pt continues to receive IV antibiotics and wound care daily as per wound care orders. Pt ate more today than yesterday during each meal, except for dinner b/c the meat was not edible.  Pt did drink some fluids though by mouth and responded appropriately to questions at end of shift.  New bag of feed hung in the early afternoon.  Pt continues to cough up thick white mucus from back of throat and he is assisted with suctioning.  Pt did receive a PRN albuterol treatment with respiratory therapy to loosen up the phlegm and mucus. Otherwise, no major events during day shift.

## 2020-08-10 NOTE — PROGRESS NOTES
[facility] NOTE    Vaughn Retana is a 55 y.o. male currently on hemodialysis for removal of uremic toxins and volume.     Patient seen and evaluated on hemodialysis, tolerating treatment, see HD flowsheet for vitals and assessments.    No Hypotension, chest pain, shortness of breath, cramping, nausea or vomiting.      Labs have been reviewed and the dialysate bath has been adjusted.    Labs:      Recent Labs   Lab 08/08/20  0445 08/09/20  0413 08/10/20  0337    135* 136   K 3.8 3.9 4.2    99 100   CO2 24 24 21*   BUN 33* 45* 56*   CREATININE 2.3* 2.8* 3.6*   CALCIUM 9.2 9.0 9.6   PHOS 3.2 4.1 4.7*       Recent Labs   Lab 08/08/20  0445 08/09/20  0413 08/10/20  0337   WBC 12.25 11.71 13.48*   HGB 7.5* 7.5* 7.4*   HCT 26.2* 24.2* 24.4*    189 233        Assessment/Plan    Ultrafiltration goal:1 L as tolerated, keep MAP >65.   - Seen on dialysis this morning, tolerating session with current UFR, no complications.    - Patient confused on exam, not answering direct questions. Appears p  - No lab stick/BP intake on access site  - Will continue dialysis treatments while in-patient  - Continue to monitor intake and output, daily weights   - Avoid nephrotoxic medication and renal dose medications to GFR    Anemia of ESRD  - Continue JONAS with dialysis treatments  - Hgb 7.4    BMM  - Renal diet with protein intake goal 1.5 g/kg/d  - Novasource with meals  - Phos 4.7  - Daily renal function panel     Glenis Benavidez, ADILIA, APRN, FNP-C  Nephrology Department  Pager:  [phone]

## 2020-08-10 NOTE — SUBJECTIVE & OBJECTIVE
Interval History: Had more fevers. Coughing up sputum.    Review of Systems   Unable to perform ROS: Patient nonverbal     Objective:     Vital Signs (Most Recent):  Temp: 100.1 °F (37.8 °C) (08/10/20 0745)  Pulse: 100 (08/10/20 0915)  Resp: 18 (08/10/20 0915)  BP: 131/72 (08/10/20 0915)  SpO2: 100 % (08/10/20 0700) Vital Signs (24h Range):  Temp:  [99.6 °F (37.6 °C)-101.2 °F (38.4 °C)] 100.1 °F (37.8 °C)  Pulse:  [100-127] 100  Resp:  [16-24] 18  SpO2:  [92 %-100 %] 100 %  BP: (116-180)/(62-86) 131/72     Weight: 57.2 kg (126 lb)  Body mass index is 20.34 kg/m².    Intake/Output Summary (Last 24 hours) at 8/10/2020 0937  Last data filed at 8/10/2020 0300  Gross per 24 hour   Intake 120 ml   Output --   Net 120 ml      Physical Exam  Vitals signs and nursing note reviewed.   Constitutional:       General: He is not in acute distress.     Appearance: He is not diaphoretic.   Cardiovascular:      Rate and Rhythm: Regular rhythm. Tachycardia present.   Pulmonary:      Effort: Pulmonary effort is normal. No respiratory distress.   Neurological:      Motor: No tremor or seizure activity.         Significant Labs: All pertinent labs within the past 24 hours have been reviewed.    Significant Imaging: I have reviewed all pertinent imaging results/findings within the past 24 hours.

## 2020-08-10 NOTE — CARE UPDATE
Rapid Response Nurse Chart Check     Chart check completed, abnormal VS noted. Temperature 101.2 axillary. Tylenol given per orders. Please call [phone] for further concerns or assistance.

## 2020-08-11 NOTE — PLAN OF CARE
08/11/20 0834   Post-Acute Status   Post-Acute Authorization Placement   Post-Acute Placement Status Awaiting Internal Medical Clearance

## 2020-08-11 NOTE — PROGRESS NOTES
"[facility]  Adult Nutrition  Progress Note    SUMMARY       Recommendations    1. Continue current TF - meeting needs.     2. Continue renal diet as tolerated.     3. Encourage po intake.     4. RD following.     Goals: pt to tolerate >85% of EEN/EPN by RD follow up  Nutrition Goal Status: goal met  Communication of RD Recs: (POC)    Reason for Assessment    Reason For Assessment: RD follow-up  Diagnosis: (sepsis due to pneumonia)  Relevant Medical History: HTN; CHF; pneumonia; ESRD on HD; GERD; DVT; L BKA  Interdisciplinary Rounds: did not attend  General Information Comments: Pt resting in bed with RNs providing care at bedside. No family members present. Noted PI. Per RNs, pt tolerating TF with very little po intake. Unsure of intake PTA or recent wt loss, pt non verbal. Noted wt gain since last RD assessment. Pt wt increased per chart over past few months, UBW ~115-120 lbs over past year with some fluctuation. Unable to assess NFPE at this time, will monitor.  Nutrition Discharge Planning: unable to determine at this time    Nutrition Risk Screen    Nutrition Risk Screen: tube feeding or parenteral nutrition    Nutrition/Diet History    Food Allergies: NKFA  Factors Affecting Nutritional Intake: impaired cognitive status/motor control, difficulty/impaired swallowing, decreased appetite    Anthropometrics    Temp: 99.7 °F (37.6 °C)  Height Method: Estimated  Height: 5' 6" (167.6 cm)  Height (inches): 66 in  Weight Method: Bed Scale  Weight: 60 kg (132 lb 4.4 oz)  Weight (lb): 132.28 lb  Ideal Body Weight (IBW), Male: 142 lb  % Ideal Body Weight, Male (lb): 88.73 %  BMI (Calculated): 21.4  BMI Grade: 18.5-24.9 - normal     Lab/Procedures/Meds    Pertinent Labs Reviewed: reviewed  Pertinent Labs Comments: BUN 56, GFR 20.6, glucose 149, phos 4.7, ALT <5  Pertinent Medications Reviewed: reviewed  Pertinent Medications Comments: docusate    Estimated/Assessed Needs    Weight Used For Calorie " Calculations: 57.5 kg (126 lb 12.2 oz)  Energy Calorie Requirements (kcal): 6664-2386 kcal/d  Energy Need Method: Kcal/kg  Protein Requirements: 57-74 g/day(1.0-1.3 g/kg)  Weight Used For Protein Calculations: 57.5 kg (126 lb 12.2 oz)  Fluid Requirements (mL): 1 mL/kcal or per MD     RDA Method (mL): 1437     Nutrition Prescription Ordered    Current Diet Order: Renal  Current Nutrition Support Formula Ordered: [employer] Renal  Current Nutrition Support Rate Ordered: 35 (ml)  Current Nutrition Support Frequency Ordered: mL/hr    Evaluation of Received Nutrient/Fluid Intake    Enteral Calories (kcal): 1680  Enteral Protein (gm): 76  Enteral (Free Water) Fluid (mL): 602  % Kcal Needs: 100  % Protein Needs: 103  Energy Calories Required: meeting needs  Protein Required: meeting needs  Fluid Required: (per MD)  Comments: LBM 8/10  Tolerance: tolerating  % Intake of Estimated Energy Needs: 75 - 100 %  % Meal Intake: 0 - 25 %    Nutrition Risk    Level of Risk/Frequency of Follow-up: (f/u 1 x wk)     Assessment and Plan    Nutrition Problem  inadequate oral intake     Related to (etiology):   Decreased PO      Signs and Symptoms (as evidenced by):   Pt with G tube due to PO <75% of meals       Interventions (treatment strategy):  Collaboration of nutrition care with other providers  Enteral nutrition     Nutrition Diagnosis Status:   Resolved      Monitor and Evaluation    Food and Nutrient Intake: energy intake, food and beverage intake, enteral nutrition intake  Food and Nutrient Adminstration: diet order, enteral and parenteral nutrition administration  Knowledge/Beliefs/Attitudes: food and nutrition knowledge/skill  Anthropometric Measurements: weight, weight change  Biochemical Data, Medical Tests and Procedures: electrolyte and renal panel, gastrointestinal profile, glucose/endocrine profile, inflammatory profile, lipid profile  Nutrition-Focused Physical Findings: overall appearance       Nutrition Follow-Up    RD  Follow-up?: Yes

## 2020-08-11 NOTE — PROGRESS NOTES
Pharmacist Renal Dose Adjustment Note    Vaughn Retana is a 55 y.o. male being treated with the medication Unasyn    Patient Data:    Vital Signs (Most Recent):  Temp: 99.7 °F (37.6 °C) (08/11/20 1204)  Pulse: 109 (08/11/20 1204)  Resp: 20 (08/11/20 1204)  BP: (!) 144/81 (08/11/20 1204)  SpO2: 98 % (08/11/20 1204)   Vital Signs (72h Range):  Temp:  [96.8 °F (36 °C)-102.9 °F (39.4 °C)]   Pulse:  []   Resp:  [16-24]   BP: (110-180)/()   SpO2:  [92 %-100 %]      Recent Labs   Lab 08/08/20  0445 08/09/20  0413 08/10/20  0337   CREATININE 2.3* 2.8* 3.6*     Serum creatinine: 3.6 mg/dL (H) 08/10/20 0337  Estimated creatinine clearance: 19.4 mL/min (A)    Unasyn 3g q12 hours will be dose adjusted to 3g q24 hours after dialysis    Pharmacist's Name: Marah Rivera  Pharmacist's Extension: [phone]

## 2020-08-11 NOTE — SUBJECTIVE & OBJECTIVE
Interval History: Still having fevers.    Review of Systems   Unable to perform ROS: Dementia     Objective:     Vital Signs (Most Recent):  Temp: 98.3 °F (36.8 °C) (08/11/20 0752)  Pulse: 107 (08/11/20 1050)  Resp: 16 (08/11/20 0752)  BP: (!) 141/83 (08/11/20 0752)  SpO2: 96 % (08/11/20 0752) Vital Signs (24h Range):  Temp:  [96.8 °F (36 °C)-102.9 °F (39.4 °C)] 98.3 °F (36.8 °C)  Pulse:  [] 107  Resp:  [16-20] 16  SpO2:  [95 %-100 %] 96 %  BP: (110-163)/(58-83) 141/83     Weight: 60 kg (132 lb 4.4 oz)  Body mass index is 21.35 kg/m².    Intake/Output Summary (Last 24 hours) at 8/11/2020 1203  Last data filed at 8/10/2020 2200  Gross per 24 hour   Intake 250 ml   Output 0 ml   Net 250 ml      Physical Exam  Vitals signs and nursing note reviewed.   Constitutional:       General: He is not in acute distress.     Appearance: He is not diaphoretic.   Pulmonary:      Effort: Pulmonary effort is normal. No respiratory distress.   Neurological:      Mental Status: He is alert.      Motor: No tremor or seizure activity.   Psychiatric:         Mood and Affect: Affect is blunt.         Cognition and Memory: Cognition is impaired. Memory is impaired.      Comments: Can speak some but answers are inappropriate to the questions         Significant Labs: All pertinent labs within the past 24 hours have been reviewed.    Significant Imaging: I have reviewed all pertinent imaging results/findings within the past 24 hours.   X-Ray Chest AP Portable 8/11/20: FINDINGS:   Study was obtained with the patient in right posterior oblique position and kyphotic position.  Although 2 AP views of the chest are provided for review the left lateral costophrenic angle is incompletely included on each of them.   Vascular catheter projects over the left hemithorax, soft tissues at the base of the left neck and mid right atrium.   Left lung is clear.   In the right lung there is patchy subsegmental opacity most evident in perihilar  distribution but incompletely visualized due to the patient's right posterior oblique rotation.  Differential diagnosis includes atelectasis, aspiration and pneumonia.   I detect no left or right pleural fluid or pneumothorax.   I detect no significant osseous abnormality.   Impression: chnically limited study reveals patchy subsegmental right lung disease with perihilar distribution suggesting atelectasis, aspiration or pneumonia..

## 2020-08-11 NOTE — PLAN OF CARE
POC discussed with patient. Pt Aaox1. C/O , responds to pain when weight being shifted. PEG TUBE drsg C/D/I. Pt refuses trays sent up for meals. Novosource infusing @35ml/hr; pt tolerated well. Fluids encouraged. Cardiac monitor in place. Cont pulse ox in place, sats >92%. Wound care completed. Wipe down/linen changed. Patient is free of fall/trauma/injury. Denies CP, SOB. All questions addressed. Will continue to monitor

## 2020-08-11 NOTE — CARE UPDATE
Rapid Response Nurse Chart Check     Chart check completed, abnormal VS noted. Temperature 100.8 @ 2118, tylenol given per orders at 2119. HR elevated at 121. BP stable. Please call [phone] for further concerns or assistance.

## 2020-08-11 NOTE — PLAN OF CARE
Problem: Feeding Intolerance (Enteral Nutrition)  Goal: Feeding Tolerance  Outcome: Ongoing, Progressing       Recommendations    1. Continue current TF - meeting needs.     2. Continue renal diet as tolerated.     3. Encourage po intake.     4. RD following.     Goals: pt to tolerate >85% of EEN/EPN by RD follow up  Nutrition Goal Status: goal met

## 2020-08-11 NOTE — ASSESSMENT & PLAN NOTE
Giving piperacillin-tazobactam and doxycycline. Has right lung pneumonia, possibly due to aspiration considering dysphagia and gastroparesis with tube feeds. Change to ampicillin-sulbactam. Giving guaifenesin to help him cough up mucus.

## 2020-08-11 NOTE — PROGRESS NOTES
[facility] Hospital Medicine  Progress Note    Patient Name: Vaughn Retana  MRN: [id number]  Patient Class: IP- Inpatient   Admission Date: 8/5/2020  Length of Stay: 6 days  Attending Physician: Jono Hutchinson MD  Primary Care Provider: Cori Randall MD    [facility] Medicine Team: [facility] HOSP MED D Jono Hutchinson MD    Subjective:     Principal Problem:Sepsis due to pneumonia        HPI:  Vaughn Retana is a 55 year old Black man with renovascular hypertension, diabetes mellitus type 2 with nephropathy, neuropathy, gastroparesis, peripheral vascular disease, status post left foot metatarsal foot amputation in 2010, status post left below-the-knee amputation on 12/18/2013 due to osteomyelitis, hepatitis C, end stage renal disease on hemodialysis (Monday Wednesday Friday), hypotension associated with dialysis (on midodrine), history of left basal ganglia intracranial hemorrhage on 5/6/2013, right basal ganglia hemorrhage on 9/2/2016, right basal ganglia hemorrhage on 11/2/2016, left caudate head hemorrage on 2/22/2017, aphasia, dysphagia status post gastrostomy placement, seizure disorder, chronic diastolic heart failure, anemia, gastroesophageal reflux disease, history of gastrointestinal bleeding (esophagitis on esophagogastroduodenoscopy on 11/12/2019), decubitus ulcers, history of COVID-19 infection on 3/25/2020. He lives at [facility] in [place], [place]. His primary care physician is Dr. Cori Randall.               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               He presented to [facility] Emergency Department the next day with tachycardia, which kept him from getting his routine dialysis. In the emergency department, he was also found to have fever of 102.2° Fahrenheit. He had no hypoxia.  Labs showed leukocytosis (WBC count 35063, from 50496 on 8/3/2020). BNP was elevated at 1748 pg/mL. Troponin was elevated at 0.168 ng/mL. COVID-19 test was negative. Chest X-ray showed new bilateral reticulonodular opacities. He was given piperacillin-tazobactam and vancomycin. He was admitted to Hospital Medicine Team D.    Overview/Hospital Course:  Antibiotics were continued. He was noted to have a stage IV sacral decubitus ulcer, unstageable right leg ulcer, stage III right ankle ulcer, stage III right fourth toe ulcer, right fifth toe ulcer, unstageable right heel ulcer, and dressings to right buttock and hip, right knee, and left below-knee amputation stump. Wound Care was consulted and noted a stage IV left hip decubitus ulcer. Orthopedic Surgery was consulted and did not note any odor or drainage to suggest infection of the left hip ulcer. On 8/7/2020, one of the blood cultures taken on admission grew coagulase-negative Staphylococcus, but it appeared to be a contaminant. Fevers persisted. He started coughing up thick sputum. Repeat chest X-ray on 8/11/2020 showed clear left lung but persisetent opacity in the right lung.     Interval History: Still having fevers.    Review of Systems   Unable to perform ROS: Dementia     Objective:     Vital Signs (Most Recent):  Temp: 98.3 °F (36.8 °C) (08/11/20 0752)  Pulse: 107 (08/11/20 1050)  Resp: 16 (08/11/20 0752)  BP: (!) 141/83 (08/11/20 0752)  SpO2: 96 % (08/11/20 0752) Vital Signs (24h Range):  Temp:  [96.8 °F (36 °C)-102.9 °F (39.4 °C)] 98.3 °F (36.8 °C)  Pulse:  [] 107  Resp:  [16-20] 16  SpO2:  [95 %-100 %] 96 %  BP: (110-163)/(58-83) 141/83     Weight: 60 kg (132 lb 4.4 oz)  Body mass index is 21.35 kg/m².    Intake/Output Summary (Last 24 hours) at 8/11/2020 1203  Last data filed at 8/10/2020 2200  Gross per 24 hour   Intake 250 ml   Output 0 ml   Net 250 ml      Physical Exam  Vitals signs and nursing note reviewed.   Constitutional:       General:  He is not in acute distress.     Appearance: He is not diaphoretic.   Pulmonary:      Effort: Pulmonary effort is normal. No respiratory distress.   Neurological:      Mental Status: He is alert.      Motor: No tremor or seizure activity.   Psychiatric:         Mood and Affect: Affect is blunt.         Cognition and Memory: Cognition is impaired. Memory is impaired.      Comments: Can speak some but answers are inappropriate to the questions         Significant Labs: All pertinent labs within the past 24 hours have been reviewed.    Significant Imaging: I have reviewed all pertinent imaging results/findings within the past 24 hours.   X-Ray Chest AP Portable 8/11/20: FINDINGS:   Study was obtained with the patient in right posterior oblique position and kyphotic position.  Although 2 AP views of the chest are provided for review the left lateral costophrenic angle is incompletely included on each of them.   Vascular catheter projects over the left hemithorax, soft tissues at the base of the left neck and mid right atrium.   Left lung is clear.   In the right lung there is patchy subsegmental opacity most evident in perihilar distribution but incompletely visualized due to the patient's right posterior oblique rotation.  Differential diagnosis includes atelectasis, aspiration and pneumonia.   I detect no left or right pleural fluid or pneumothorax.   I detect no significant osseous abnormality.   Impression: chnically limited study reveals patchy subsegmental right lung disease with perihilar distribution suggesting atelectasis, aspiration or pneumonia..       Assessment/Plan:      * Sepsis due to pneumonia  Giving piperacillin-tazobactam and doxycycline. Has right lung pneumonia, possibly due to aspiration considering dysphagia and gastroparesis with tube feeds. Change to ampicillin-sulbactam. Giving guaifenesin to help him cough up mucus.     Pressure injury of left hip, stage 4  Plastic Surgery planning  debridement when they have time.    Nursing home resident  Return to [facility] when medically stable.    End-stage renal disease on hemodialysis  Dialysis per Nephrology.    Gastroparesis  Continue home metoclopramide.    Hemodialysis-associated hypotension  History of renal hypertension  No longer on antihypertensive medications. Continue home midodrine as needed.    History of GI bleed  Continue home pantoprazole.    Chronic diastolic heart failure  Fluid removal with dialysis.    History of Intracerebral Hemorrhage: L BG 5/2013; R BG 9/2016; R BG 11/2016; L caudate head 2/2017  Gastrostomy status  Aphasia  Seizure disorder as sequela of cerebrovascular accident  Continue home levetiracetam. Turn every 2 hours to avoid new decubitus ulcers.    History of left below knee amputation 12/18/13  Turn patient every 2 hours.    Peripheral vascular disease in diabetes mellitus  Not on medications.    Dyslipidemia  Not on medications.    Anemia in chronic kidney disease  Stable.      VTE Risk Mitigation (From admission, onward)         Ordered     IP VTE HIGH RISK PATIENT  Once      08/05/20 1446     Place sequential compression device  Until discontinued      08/05/20 1446                      Jono Hutchinson MD  Department of Hospital Medicine   [facility]

## 2020-08-12 VITALS
SYSTOLIC BLOOD PRESSURE: 69 MMHG | OXYGEN SATURATION: 94 % | RESPIRATION RATE: 12 BRPM | TEMPERATURE: 98 F | BODY MASS INDEX: 21.25 KG/M2 | HEART RATE: 101 BPM | WEIGHT: 132.25 LBS | DIASTOLIC BLOOD PRESSURE: 43 MMHG | HEIGHT: 66 IN

## 2020-08-12 LAB
BACTERIA BLD CULT: NORMAL
BACTERIA BLD CULT: NORMAL

## 2020-08-12 PROCEDURE — 99239 PR HOSPITAL DISCHARGE DAY,>30 MIN: ICD-10-PCS | Mod: ,,, | Performed by: HOSPITALIST

## 2020-08-12 PROCEDURE — 63600175 PHARM REV CODE 636 W HCPCS: Performed by: PHYSICIAN ASSISTANT

## 2020-08-12 PROCEDURE — 99239 HOSP IP/OBS DSCHRG MGMT >30: CPT | Mod: ,,, | Performed by: HOSPITALIST

## 2020-08-12 NOTE — SIGNIFICANT EVENT
Called to bedside by nursing to evaluate patient for hypotension. Verbal order for fluids given. When I arrived a few minutes later the patient was septic with tachycardia, hypotension, fever. He was at neurologic baseline per RN. They had provided suction earlier with copious output. He was saturating 100% on RA. Tube feeds had been stopped. Last HD was yesterday. He was give midodrine 5mg earlier. CODE SEPSIS called, labs ordered, vancomycin and zosyn ordered. In the few minutes of waiting for lab and antibiotics to arrive the patient started to have agonal breathing with increasing periods of apnea. He eventually became more bradycardic and more apneic and terminally developed PEA. DNR confirmed. Family notified of patient's passing.    Phoenix Carmichael, PA-C  [facility] Medicine

## 2020-08-12 NOTE — PROGRESS NOTES
RAPID RESPONSE NURSE NOTE     Admit Date: 20  LOS: 7  Code Status: DNR   Date of Consult: 2020  : 1965  Age: 55 y.o.  Weight:   Wt Readings from Last 1 Encounters:   20 60 kg (132 lb 4.4 oz)     Sex: male  Race: Black or    Bed: [facility] A:   MRN: [id number]  Time Rapid Response Team page Received: Was doing proactive rounding   Time Rapid Response Team at Bedside: 2230  Time Rapid Response Team left Bedside: 2330  Was the patient discharged from an ICU this admission?   no  Was the patient discharged from a PACU within last 24 hours?  no  Did the patient receive conscious sedation/general anesthesia within last 24 hours?  no  Was the patient in the ED within the past 24 hours?  no  Was the patient started on NIPPV within the past 24 hours?  no  Did this progress into an ARC or CPA:  yes  Attending Physician: Jono Hutchinson MD  Primary Service: [facility] HOSP MED D  Consult Requested By: Jono Hutchinson MD     SITUATION     Notified by [facility] patient alert.  Reason for alert: Sepsis alert  Called to evaluate the patient for Circulatory    BACKGROUND     Why is the patient in the hospital?: Sepsis due to pneumonia    Patient has a past medical history of Amputation stump pain, Aspiration pneumonia, Asterixis, C. 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    ASSESSMENT/INTERVENTIONS     What did you find:  Pt is DNR. Pt here for PNA, last blood vultures drawn 8/7, last CBC 8/10 0337 with WBC 13.48 a bump from 11.71, last lactic drawn 8/5 1.2    2208 Prior to rapid response arrival, patient BP 71/57 (57) bedside RN called team (Pt also had fever of 101 at start of shift and now is 97.9 post Tylenol and is diaphoretic.   2213 Jannet Parikh NP ordered Midodrine 5mg   2217 Midodrine given  2230 Rapid response came to bedside for proactive round - BP not picking up, manual SBP 50's, called provider to bedside. LR bolus started  2241- Rapid response called due to continued hypotension, no respiratory compromise at this time   2251 PRATIK Hill to bedside ordered STAT labs CBC, CMP, lactic acid, blood cultures  2303- Code sepsis called, respiratory pattern changing, pt RR 16 and breathing heavy. Respiratory caleld for ABG, critical care called to bedside.   2315- Apnea episodes began, DNR status.    2320- Time of death     RECOMMENDATIONS     We recommend: Family to be contacted by PRATIK Hill    FOLLOW-UP/CONTINGENCY PLAN     Call the Rapid Response Nurse, Rekha Bellamy RN at x [phone] for additional questions or concerns.    PHYSICIAN ESCALATION     Orders received and case discussed with PRATIK Hill    Disposition: [place]

## 2020-08-12 NOTE — CARE UPDATE
Notified family (sister Alicia Retana) of patient's passing. Condolences offered.     Phoenix Carmichael PA-C  [facility]

## 2020-08-12 NOTE — PROGRESS NOTES
Pharmacokinetic Initial Assessment: IV Vancomycin    Assessment/Plan:    Initiate intravenous vancomycin with loading dose of 1250 mg once with subsequent doses when random concentrations are less than 20 mcg/mL  Desired empiric serum trough concentration is 15 to 20 mcg/mL  Draw vancomycin random level on 8/12/20 at 2200.  Pharmacy will continue to follow and monitor vancomycin.      Please contact pharmacy at extension [phone] with any questions regarding this assessment.     Thank you for the consult,   Jarred Vargas       Patient brief summary:  Vaughn Retana is a 55 y.o. male initiated on antimicrobial therapy with IV Vancomycin for treatment of suspected bacteremia    Drug Allergies:   Review of patient's allergies indicates:   Allergen Reactions    Lanthanum Nausea And Vomiting     Nausea and vomiting  Nausea and vomiting       Actual Body Weight:   60 kg    Renal Function:   Estimated Creatinine Clearance: 19.4 mL/min (A) (based on SCr of 3.6 mg/dL (H)).,     Dialysis Method (if applicable):  intermittent HD    CBC (last 72 hours):  Recent Labs   Lab Result Units 08/09/20 0413 08/10/20  0337   WBC K/uL 11.71 13.48*   Hemoglobin g/dL 7.5* 7.4*   Hematocrit % 24.2* 24.4*   Platelets K/uL 189 233   Gran% % 69.8 74.6*   Lymph% % 10.0* 8.8*   Mono% % 9.9 9.0   Eosinophil% % 8.4* 6.2   Basophil% % 0.3 0.5   Differential Method  Automated Automated       Metabolic Panel (last 72 hours):  Recent Labs   Lab Result Units 08/09/20 0413 08/10/20  0337   Sodium mmol/L 135* 136   Potassium mmol/L 3.9 4.2   Chloride mmol/L 99 100   CO2 mmol/L 24 21*   Glucose mg/dL 141* 149*   BUN, Bld mg/dL 45* 56*   Creatinine mg/dL 2.8* 3.6*   Albumin g/dL 1.1* 1.2*   Total Bilirubin mg/dL 0.3 0.3   Alkaline Phosphatase U/L 124 118   AST U/L 31 21   ALT U/L 7* <5*   Magnesium mg/dL 1.8 1.8   Phosphorus mg/dL 4.1 4.7*       Drug levels (last 3 results):  No results for input(s): VANCOMYCINRA, VANCOMYCINPE, VANCOMYCINTR in the last 72  hours.    Microbiologic Results:  Microbiology Results (last 7 days)       Procedure Component Value Units Date/Time    Culture, Respiratory with Gram Stain [[id number]]     Order Status: No result Specimen: Respiratory     Blood culture [[id number]]     Order Status: Sent Specimen: Blood     Blood culture [[id number]]     Order Status: Sent Specimen: Blood     Blood culture [[id number]] Collected: 08/07/20 1125    Order Status: Completed Specimen: Blood from Antecubital, Left Arm Updated: 08/11/20 1412     Blood Culture, Routine No Growth to date      No Growth to date      No Growth to date      No Growth to date      No Growth to date    Narrative:      Collection has been rescheduled by DW1 at 08/07/2020 10:40 Reason:   wound care is working with patient   Collection has been rescheduled by DW1 at 08/07/2020 10:40 Reason:   wound care is working with patient     Blood culture [[id number]] Collected: 08/07/20 1124    Order Status: Completed Specimen: Blood Updated: 08/11/20 1412     Blood Culture, Routine No Growth to date      No Growth to date      No Growth to date      No Growth to date      No Growth to date    Narrative:      Collection has been rescheduled by DW1 at 08/07/2020 10:40 Reason:   wound care is working with patient   Collection has been rescheduled by DW1 at 08/07/2020 10:40 Reason:   wound care is working with patient     Blood culture x two cultures. Draw prior to antibiotics. [[id number]] Collected: 08/05/20 0910    Order Status: Completed Specimen: Blood from Peripheral, Hand, Right Updated: 08/10/20 1212     Blood Culture, Routine No growth after 5 days.    Narrative:      Aerobic and anaerobic    Blood culture x two cultures. Draw prior to antibiotics. [[id number]]  (Abnormal) Collected: 08/05/20 0820    Order Status: Completed Specimen: Blood from Line, Subclavian, Left Updated: 08/09/20 1104     Blood Culture, Routine Gram stain miya bottle: Gram positive cocci in clusters resembling Staph        Results called to and read back by: Deb Hopkins RN 08/07/2020  05:05      Gram stain aer bottle: Gram positive cocci in clusters resembling Staph       Positive results previously called 08/07/2020  15:50      COAGULASE-NEGATIVE STAPHYLOCOCCUS SPECIES  Organism is a probable contaminant      Narrative:      Aerobic and anaerobic    Culture, Respiratory with Gram Stain [[id number]]     Order Status: Canceled Specimen: Sputum, Expectorated

## 2020-08-12 NOTE — PLAN OF CARE
Death Note    Called to bedside by patient's nurse. Nursing supervisor notified. Family called and notified.    Patient is not responding to verbal or tactile stimuli. Patient does not have a papillary or corneal reflex. His pupils are fixed and dilated. No heart or breath sounds on auscultation. No respirations. No palpable pulses.     Time of death: 23:20    Cause of Death: Sepsis due to pneumonia    LEEONESIMO paperwork to be sent to primary attending physician, MD Vishal Hall MD  [facility]

## 2020-08-12 NOTE — CARE UPDATE
Rapid Response Nurse Chart Check     Chart check completed, abnormal VS noted. Bedside RNSiobhan contacted. No concerns verbalized at this time. Instructed to call [phone] for further concerns or assistance.

## 2020-08-12 NOTE — DISCHARGE SUMMARY
[facility] Hospital Medicine  Discharge Summary      Patient Name: Vaughn Retana  MRN: [id number]  Admission Date: 8/5/2020  Hospital Length of Stay: 7 days  Discharge Date and Time: 8/12/2020  4:00 AM  Attending Physician: Jono Hutchinson MD   Discharging Provider: Jono Hutchinson MD  Primary Care Provider: Cori Randall MD  [facility] Medicine Team: [facility] HOSP MED D Jono Hutchinson MD    HPI:   Vaughn Retana is a 55 year old Black man with renovascular hypertension, diabetes mellitus type 2 with nephropathy, neuropathy, gastroparesis, peripheral vascular disease, status post left foot metatarsal foot amputation in 2010, status post left below-the-knee amputation on 12/18/2013 due to osteomyelitis, hepatitis C, end stage renal disease on hemodialysis (Monday Wednesday Friday), hypotension associated with dialysis (on midodrine), history of left basal ganglia intracranial hemorrhage on 5/6/2013, right basal ganglia hemorrhage on 9/2/2016, right basal ganglia hemorrhage on 11/2/2016, left caudate head hemorrage on 2/22/2017, aphasia, dysphagia status post gastrostomy placement, seizure disorder, chronic diastolic heart failure, anemia, gastroesophageal reflux disease, history of gastrointestinal bleeding (esophagitis on esophagogastroduodenoscopy on 11/12/2019), decubitus ulcers, history of COVID-19 infection on 3/25/2020. He lives at [facility] in [place], [place]. His primary care physician is Dr. Cori Randall. He is DNR.              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               He presented to [facility] Emergency Department the next day with tachycardia, which kept him from getting his routine dialysis. In the emergency department, he was also found to have fever of 102.2° Fahrenheit. He  had no hypoxia. Labs showed leukocytosis (WBC count 10178, from 01936 on 8/3/2020). BNP was elevated at 1748 pg/mL. Troponin was elevated at 0.168 ng/mL. COVID-19 test was negative. Chest X-ray showed new bilateral reticulonodular opacities. He was given piperacillin-tazobactam and vancomycin. [facility] was [facility] Medicine [facility] D.      Hospital Course:   Antibiotics were continued. He was noted to have a stage IV sacral decubitus ulcer, unstageable right leg ulcer, stage III right ankle ulcer, stage III right fourth toe ulcer, right fifth toe ulcer, unstageable right heel ulcer, and dressings to right buttock and hip, right knee, and left below-knee amputation stump. Wound Care was consulted and noted a stage IV left hip decubitus ulcer. Orthopedic Surgery was consulted and did not note any odor or drainage to suggest infection of the left hip ulcer. On 2020, one of the blood cultures taken on admission grew coagulase-negative Staphylococcus, but it appeared to be a contaminant. Fevers persisted. He started coughing up thick sputum. Repeat chest X-ray on 2020 showed clear left lung but persisetent opacity in the right lung. With known dysphagia, antibiotics were changed to ampicillin-sulbactam to better treat presumed aspiration pneumonia. That night, he became hypotensive. Hypotension persisted after fluid bolus. He went into respiratory distress. He .    Time of death: 2020 23:20  Cause of death: Sepsis due to pneumonia     Consults:   Consults (From admission, onward)        Status Ordering Provider     Inpatient consult to Nephrology  Once     Provider:  (Not yet assigned)    Completed ALIRIO HILL     Inpatient consult to Orthopedic Surgery  Once     Provider:  (Not yet assigned)    Completed JENNIFER FONSECA     Inpatient consult to Plastic Surgery  Once     Provider:  (Not yet assigned)    Completed JOEL MEJIA        Final Active Diagnoses:    Diagnosis Date Noted POA     PRINCIPAL PROBLEM:  Sepsis due to pneumonia [J18.9, A41.9] 2020 Yes    Gastrostomy status [Z93.1] 2020 Not Applicable     Chronic    Pressure injury of left hip, stage 4 [L89.224] 2020 Yes    ESRD (end stage renal disease) on dialysis [N18.6, Z99.2]  Not Applicable    End-stage renal disease on hemodialysis [N18.6, Z99.2] 2020 Not Applicable     Chronic    Aphasia [R47.01] 2020 Yes     Chronic    Nursing home resident [Z59.3] 2020 Not Applicable     Chronic    Seizure disorder as sequela of cerebrovascular accident [I69.398, G40.909] 2020 Not Applicable     Chronic    Gastroparesis [K31.84] 2019 Yes     Chronic    Hemodialysis-associated hypotension [I95.3]  Yes     Chronic    History of GI bleed [Z87.19] 2018 Not Applicable    History of renal hypertension [Z86.79] 2018 Not Applicable    Chronic diastolic heart failure [I50.32]  Yes     Chronic    History of Intracerebral Hemorrhage: L BG 2013; R BG 2016; R BG 2016; L caudate head 2017 [Z86.79] 2016 Not Applicable     Chronic    History of left below knee amputation 13 [Z89.512] 2013 Not Applicable     Chronic    Peripheral vascular disease in diabetes mellitus [E11.51] 2013 Yes     Chronic    Dyslipidemia [E78.5] 2013 Yes     Chronic    Anemia in chronic kidney disease [N18.9, D63.1] 2012 Yes     Chronic      Problems Resolved During this Admission:       Discharged Condition:     Disposition:     Patient Instructions:   No discharge procedures on file.    Significant Diagnostic Studies:   X-Ray Chest AP Portable 20: FINDINGS: Cardiac wires overlie the chest.  Lung volumes are low.  Left-sided central venous catheter overlies the SVC.  Right subclavian vascular stent is present.  There are patchy bilateral reticulonodular opacities, most pronounced in the right upper lung zone and bilateral lung bases.  Radiodensity  overlying the left lung base is likely external to the patient.  No confluent area of consolidation.  No sizable pleural effusion.  No pneumothorax.   Impression:   New bilateral reticulonodular opacities suggestive of infectious/inflammatory process including pneumonia or aspiration in the setting of sepsis.   X-Ray Chest AP Portable 20: FINDINGS:   Study was obtained with the patient in right posterior oblique position and kyphotic position.  Although 2 AP views of the chest are provided for review the left lateral costophrenic angle is incompletely included on each of them.   Vascular catheter projects over the left hemithorax, soft tissues at the base of the left neck and mid right atrium.   Left lung is clear.   In the right lung there is patchy subsegmental opacity most evident in perihilar distribution but incompletely visualized due to the patient's right posterior oblique rotation.  Differential diagnosis includes atelectasis, aspiration and pneumonia.   I detect no left or right pleural fluid or pneumothorax.   I detect no significant osseous abnormality.   Impression: chnically limited study reveals patchy subsegmental right lung disease with perihilar distribution suggesting atelectasis, aspiration or pneumonia..      Medications:  None (patient  at medical facility)    Indwelling Lines/Drains at time of discharge:   Lines/Drains/Airways     Drain                 Hemodialysis AV Fistula Right upper arm -- days         Gastrostomy/Enterostomy 20 0950 Gastrostomy tube w/ balloon midline 181 days                Time spent on the discharge of patient: 35 minutes  Patient was seen and examined on the date of discharge and determined to be suitable for discharge.         Jono Hutchinson MD  Department of Hospital Medicine  [facility]

## 2020-08-12 NOTE — PLAN OF CARE
20 0810   Final Note   Assessment Type Final Discharge Note   Anticipated Discharge Disposition    Flaquita Carbajal, MSN  Case Management  Ext [phone]

## 2020-08-12 NOTE — NURSING
Patient BP 71/51 at 2200.  IMD / Provider Hosea notified of low BP, as well as earlier temp of 101 which had resolved by time of call.  5mg midodrine ordered by provider; dose given.  Rapid response team called to room re: BP, RR arriving to bedside at 2220.

## 2021-06-18 NOTE — SUBJECTIVE & OBJECTIVE
Interval History: Hemoglobin dropped overnight to 6.2. Transfused 1x unit of pRBCs. He denies any further episodes of hematemesis.  GI preformed EGD which was negative for any active bleeding.     Review of Systems   Constitutional: Negative for fatigue and fever.   HENT: Negative for congestion.    Eyes: Negative for visual disturbance.   Respiratory: Negative for cough and shortness of breath.    Cardiovascular: Negative for chest pain.   Gastrointestinal: Negative for abdominal pain.   Genitourinary: Negative for dysuria.   Skin: Negative for rash.   Neurological: Negative for headaches.     Objective:     Vital Signs (Most Recent):  Temp: 98 °F (36.7 °C) (10/02/19 1446)  Pulse: 92 (10/02/19 1600)  Resp: 20 (10/02/19 1319)  BP: 119/68 (10/02/19 1600)  SpO2: 96 % (10/02/19 1319) Vital Signs (24h Range):  Temp:  [97.5 °F (36.4 °C)-98.9 °F (37.2 °C)] 98 °F (36.7 °C)  Pulse:  [] 92  Resp:  [16-21] 20  SpO2:  [93 %-100 %] 96 %  BP: (119-157)/(65-87) 119/68        There is no height or weight on file to calculate BMI.    Intake/Output Summary (Last 24 hours) at 10/2/2019 1610  Last data filed at 10/2/2019 1214  Gross per 24 hour   Intake 573.75 ml   Output --   Net 573.75 ml      Physical Exam   Constitutional: He is oriented to person, place, and time.   Patient is lying bed, appears alert, and in no acute distress.    HENT:   Head: Normocephalic and atraumatic.   Eyes: Pupils are equal, round, and reactive to light. EOM are normal.   Neck: Normal range of motion. No thyromegaly present.   Cardiovascular: Normal rate, regular rhythm and intact distal pulses.   Pulmonary/Chest: Effort normal and breath sounds normal. No respiratory distress.   Abdominal: Soft. Bowel sounds are normal. He exhibits no distension. There is no tenderness.   Musculoskeletal: Normal range of motion.   L BKA    Neurological: He is alert and oriented to person, place, and time.   Delayed when answering questions.   Psychiatric: He has a  Low-Purine Eating Plan  A low-purine eating plan involves making food choices to limit your intake of purine. Purine is a kind of uric acid. Too much uric acid in your blood can cause certain conditions, such as gout and kidney stones. Eating a low-purine diet can help control these conditions.  What are tips for following this plan?  Reading food labels    · Avoid foods with saturated or Trans fat.  · Check the ingredient list of grains-based foods, such as bread and cereal, to make sure that they contain whole grains.  · Check the ingredient list of sauces or soups to make sure they do not contain meat or fish.  · When choosing soft drinks, check the ingredient list to make sure they do not contain high-fructose corn syrup.  Shopping  · Buy plenty of fresh fruits and vegetables.  · Avoid buying canned or fresh fish.  · Buy dairy products labeled as low-fat or nonfat.  · Avoid buying premade or processed foods. These foods are often high in fat, salt (sodium), and added sugar.  Cooking  · Use olive oil instead of butter when cooking. Oils like olive oil, canola oil, and sunflower oil contain healthy fats.  Meal planning  · Learn which foods do or do not affect you. If you find out that a food tends to cause your gout symptoms to flare up, avoid eating that food. You can enjoy foods that do not cause problems. If you have any questions about a food item, talk with your dietitian or health care provider.  · Limit foods high in fat, especially saturated fat. Fat makes it harder for your body to get rid of uric acid.  · Choose foods that are lower in fat and are lean sources of protein.  General guidelines  · Limit alcohol intake to no more than 1 drink a day for nonpregnant women and 2 drinks a day for men. One drink equals 12 oz of beer, 5 oz of wine, or 1½ oz of hard liquor. Alcohol can affect the way your body gets rid of uric acid.  · Drink plenty of water to keep your urine clear or pale yellow. Fluids can help  normal mood and affect.     Significant Labs: All pertinent labs within the past 24 hours have been reviewed.     Recent Labs   Lab 09/30/19  1711  10/01/19  0005 10/01/19  0443  10/02/19  0038 10/02/19  0610 10/02/19  1335   WBC 10.94  --   --   --    < > 7.41 12.93* 7.20   HGB 8.7*  --   --   --    < > 6.2* 7.6* 7.9*   HCT 26.4*  --   --   --    < > 19.4* 24.4* 25.1*     --   --   --    < > 193 199 194   *  --   --   --    < > 101* 99* 100*   RDW 14.3  --   --   --    < > 14.5 14.7* 15.3*      < > 141 142  --   --  142  --    K 3.4*   < > 4.1 4.0  --   --  3.5  --       < > 103 105  --   --  103  --    CO2 27   < > 30* 29  --   --  30*  --    BUN 15   < > 14 16  --   --  25*  --    CREATININE 3.6*   < > 4.1* 4.5*  --   --  6.6*  --    *   < > 136* 117*  --   --  96  --    PROT 5.4*  --   --  5.2*  --   --  5.3*  --    ALBUMIN 2.0*  --   --  1.9*  --   --  1.9*  --    BILITOT 0.2  --   --  0.2  --   --  0.9  --    AST 17  --   --  24  --   --  22  --    ALKPHOS 141*  --   --  127  --   --  142*  --    ALT 6*  --   --  9*  --   --  10  --     < > = values in this interval not displayed.       Significant Imaging: I have reviewed all pertinent imaging results/findings within the past 24 hours.      remove uric acid from your body.  · If directed by your health care provider, take a vitamin C supplement.  · Work with your health care provider and dietitian to develop a plan to achieve or maintain a healthy weight. Losing weight can help reduce uric acid in your blood.  What foods are recommended?  The items listed may not be a complete list. Talk with your dietitian about what dietary choices are best for you.  Foods low in purines  Foods low in purines do not need to be limited. These include:  · All fruits.  · All low-purine vegetables, pickles, and olives.  · Breads, pasta, rice, cornbread, and popcorn. Cake and other baked goods.  · All dairy foods.  · Eggs, nuts, and nut butters.  · Spices and condiments, such as salt, herbs, and vinegar.  · Plant oils, butter, and margarine.  · Water, sugar-free soft drinks, tea, coffee, and cocoa.  · Vegetable-based soups, broths, sauces, and gravies.  Foods moderate in purines  Foods moderate in purines should be limited to the amounts listed.  · ½ cup of asparagus, cauliflower, spinach, mushrooms, or green peas, each day.  · 2/3 cup uncooked oatmeal, each day.  · ¼ cup dry wheat bran or wheat germ, each day.  · 2-3 ounces of meat or poultry, each day.  · 4-6 ounces of shellfish, such as crab, lobster, oysters, or shrimp, each day.  · 1 cup cooked beans, peas, or lentils, each day.  · Soup, broths, or bouillon made from meat or fish. Limit these foods as much as possible.  What foods are not recommended?  The items listed may not be a complete list. Talk with your dietitian about what dietary choices are best for you.  Limit your intake of foods high in purines, including:  · Beer and other alcohol.  · Meat-based gravy or sauce.  · Canned or fresh fish, such as:  ? Anchovies, sardines, herring, and tuna.  ? Mussels and scallops.  ? Codfish, trout, and juan.  · Rodriguez.  · Organ meats, such as:  ? Liver or kidney.  ? Tripe.  ? Sweetbreads (thymus gland or  pancreas).  · Wild game or goose.  · Yeast or yeast extract supplements.  · Drinks sweetened with high-fructose corn syrup.  Summary  · Eating a low-purine diet can help control conditions caused by too much uric acid in the body, such as gout or kidney stones.  · Choose low-purine foods, limit alcohol, and limit foods high in fat.  · You will learn over time which foods do or do not affect you. If you find out that a food tends to cause your gout symptoms to flare up, avoid eating that food.  This information is not intended to replace advice given to you by your health care provider. Make sure you discuss any questions you have with your health care provider.  Document Revised: 11/30/2018 Document Reviewed: 01/31/2018  [employer] Patient Education © 2021 [employer]      Refilled cochicine,   Low purine diet,   Cont f/u with rheumatology as scheduled.   Encouraged compression stockings and breaks when driving.  If symptoms persist call office.    Patient agrees with plan of care and understands instructions. Call if worsening symptoms or any problems or concerns.

## 2021-07-22 NOTE — PLAN OF CARE
Ashleigh Perera is a 60 year old who is being evaluated via a billable telephone visit.      What phone number would you like to be contacted at? Mobile  How would you like to obtain your AVS? MyChart      Subjective   Ashleigh Perera is a 60 year old who presents for COPD and pulmonary Nodules    HPI   I had the pleasure of speaking with Ashleigh Alonzo, who is a pleasant 60 yr old female with a history of COPD, pulmonary nodules and breast cancer s/p chemoradiation and mastectomy who presents for follow up today. She was previously followed by Dr Owen in 10/2018.  To briefly review, she was diagnosed with COPD several years ago and has been well managed on Spiriva. She was diagnosed with breast cancer in 2017 and was started on chemotherapy and had several clinic visits and hospitalizations for neutropenic fever.  In 02/2018, during an admission for neutropenic fever and diffuse pulmonary infiltrates, pulmonary team was consulted and a bronch with BAL was done. Cultures were negative and she was started on high-dose methylprednisolone and eventually discharged on prednisone.  This was slowly tapered prior to her mastectomy.  By the time of followup for the mastectomy, respiratory symptoms had resolved as had neutropenia.  Presumed etiology for the peribronchovascular consolidations was the chemotherapeutic agent pembrolizumab.     Today, she has no new complaints. She recently retired and has been trying to be more active. She has noted a mild exercise limitation with increased SOB while walking and she needs to use her albuterol  Up to twice daily. She still continues with incruse daily.  She has minimal daily cough and she denies any fevers, no night sweats and no chest pain.  She is no longer on chemo and she continues to follow with radiation oncology with no issues    Review of Systems   Constitutional, HEENT, cardiovascular, pulmonary, GI, , musculoskeletal, neuro, skin, endocrine and psych systems are negative, except  Mr. Eisenberg attempted to take his Golytely but became increasingly nauseated. PO Zofran did not help pt. He continue to spit or vomit up emesis; sometimes light green and very slimy, sometimes light brown and slimy. A call was placed to Medicine 2 to make him aware of situation. Pt continues to be NPO. This am orders were placed for clear liquids again. Possible EGD- colonoscopy in am 11/12.  Report given to dialysis nurse for pt to be transported to dialysis for tx. Will continue to monitor.   as otherwise noted.      Objective           Vitals:  No vitals were obtained today due to virtual visit.    Physical Exam   healthy, alert and no distress  PSYCH: Alert and oriented times 3; coherent speech, normal   rate and volume, able to articulate logical thoughts, able   to abstract reason, no tangential thoughts, no hallucinations   or delusions  Her affect is normal  RESP: No cough, no audible wheezing, able to talk in full sentences  Remainder of exam unable to be completed due to telephone visits    CT Chest - 07/20/2021 - Reviewed and discussed with patient  1. No new or enlarging pulmonary nodules.  2. Extensive sequelae of prior granulomatous infection.  3. Postsurgical changes of right breast lumpectomy without evidence of  local recurrence.  4. Sequelae of radiation therapy with subpleural fibrosis in the right  upper lobe. Contiguous anterior right rib fractures likely also  represent sequelae of radiation therapy.  5. Avascular necrosis of the right humeral head without subchondral  collapse.       ASSESSMENT AND PLAN:  The patient is a 57-year-old woman here for followup of complex pulmonary issues.      1.  Chronic obstructive pulmonary disease (Group B)  The patient has a previous diagnosis of COPD.  She quit smoking in 1999.  She has been on tiotropium only and has worsened COPD symptoms.  I will switch her to a LAMA/LABA and prescriptions for Anoro was given today.     2.  Pulmonary nodules:   She has a remote history of pulmonary nodules, have been stable on prior imaging indicating likely benign etiology.  Given duration of time since quitting smoking, she is no longer eligible for lung cancer screening with low-dose CT scan. We will continue yearly surveillance for up to 5 yrs due to her prior history of breast cancer. CT Chest in 12 years.     3.  Drug reaction/pneumonitis possibly related to pembrolizumab, treated with high-dose steroids. Resolved and no recurrence.    4. Sleep  apnea  Follow up with sleep medicine.      Amina Licona MD  Pulmonary, Critical Care and Sleep Medicine  [facility]  Pager: [phone]      Phone call duration: 30 minutes

## 2021-08-27 NOTE — NURSING
1st unit of blood completed. 2nd unit of blood requested. Patient tolerated well.    Performed Resulted
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
## 2022-08-12 NOTE — PHARMACY MED REC
"Admission Medication Reconciliation - Pharmacy Consult Note    The home medication history was taken by Ceci Vega, Pharmacy Technician.  Based on information gathered and subsequent review by the clinical pharmacist, the items below may need attention.    You may go to "Admission" then "Reconcile Home Medications" tabs to review and/or act upon these items.        No issues noted with the medication reconciliation.        Potential issues to be addressed PRIOR TO DISCHARGE  o Patient lacks understanding of therapy- non compliant with most of his outpatient medications     Please address this information as you see fit.  Feel free to contact us if you have any questions or require assistance.  Joseline Hurley  EXT [phone]     .    .          " [facility]...

## 2023-01-18 NOTE — ASSESSMENT & PLAN NOTE
- Patient with fever of 101F in ED which eventually resolved after rectal Tylenol. HR of 112 resolved with IVF. Also with altered mentation - he is groggy with me, but otherwise AAOX3.   - Without clear source for infection.    - Patient with dry hacking cough in room, unclear time frame. Denies dysuria with little UOP he still produces.    - AVF non-erythematous or warm to touch and no other rashes present.    - CXR and LA X2 unremarkable and without leukocytosis.   - Pro-calcitonin minimally elevated 0.4   - UA ordered but patient not producing urine and nothing even with straight cath.  - Given dose of ceftriaxone + vancomycin in ED with sepsis bolus.   - Considering mild abdominal pain, n/v - ordered CT Abdomen - without evidence of colitis or other infection. Constipation noted.  - Patient has remained afebrile since acetaminophen suppository with tachycardia resolved after fluids.  - Considering questionable social history, drug-induced hyperthermia in differential (ie cocaine, PCP, morphine w/d) although would expect agitation with fever rather than depressed state in this patient.  - Patient states he is feeling better - considering dry cough, patient possibly developed upper URI; symptomatic management with tessalon pearls.   - F/u on blood cultures and urine cultures (if able to be attained).   - Holding antibiotics in interim but low threshold to restart.      Medical Necessity Clause: This procedure was medically necessary because the lesions that were treated were: Show Spray Paint Technique Variable?: Yes Consent: The patient's consent was obtained including but not limited to risks of crusting, scabbing, blistering, scarring, darker or lighter pigmentary change, recurrence, incomplete removal and infection. Spray Paint Text: The liquid nitrogen was applied to the skin utilizing a spray paint frosting technique. Post-Care Instructions: I reviewed with the patient in detail post-care instructions. Patient is to wear sunprotection, and avoid picking at any of the treated lesions. Pt may apply Vaseline to crusted or scabbing areas. Render Note In Bullet Format When Appropriate: No Duration Of Freeze Thaw-Cycle (Seconds): 5 Detail Level: Simple Medical Necessity Information: It is in your best interest to select a reason for this procedure from the list below. All of these items fulfill various CMS LCD requirements except the new and changing color options. Number Of Freeze-Thaw Cycles: 1 freeze-thaw cycle

## 2023-07-25 NOTE — PT/OT/SLP EVAL
Medication Management Service, Warfarin Management  CHELSEA FRANCO [facility], [phone]  Visit Date: 2023   Subjective:   Kelin Connors is a 49 y.o. female who presents to clinic today for anticoagulation monitoring and adjustment. Patient seen in clinic for warfarin management due to  Indication:    Acute occlusion of artery of lower extremity due to thrombosis. .   INR goal: of 2.5-3.5. Duration of therapy: indefinite. Assessment and PLAN   PT/INR done in office per protocol. INR today is 2.3, subtherapeutic; patient reports extra bowl of greens two days ago. Plan: Will continue regimen of warfarin 3.75mg on ,  only; 7.5mg all other days of the week. Using warfarin 7.5 mg tablets. Recheck INR in 4 weeks. Patient seen in room #3.        Afshan Bhatia, [place], CACP  Clinical Pharmacist Medication Management  2023  10:59 AM      For Pharmacy Admin Tracking Only    Intervention Detail: Adherence Monitorin  Total # of Interventions Recommended: 1  Total # of Interventions Accepted: 1  Time Spent (min): 15 Speech Language Pathology Evaluation  Bedside Swallow    Patient Name:  Vaughn Retana   MRN:  [id number]  Admitting Diagnosis: UGIB (upper gastrointestinal bleed)    Recommendations:                 General Recommendations:  Dysphagia therapy  Diet recommendations:  Dental Soft, Thin   Aspiration Precautions: Feed only when awake/alert, HOB to 90 degrees, No straws, Small bites/sips and Standard aspiration precautions   General Precautions: Standard, aspiration, fall, seizure  Communication strategies:  none    History:     Past Medical History:   Diagnosis Date    Amputation stump pain 9/10/2013    Aspiration pneumonia 7/27/2015    Asterixis 11/8/2016    C. difficile colitis 8/7/2015    Cholelithiasis without obstruction 8/25/2015    Chronic diastolic heart failure     2-23-17   1 - Low normal to mildly depressed left ventricular systolic function (EF 50-55%).    2 - Right ventricular enlargement with mildly depressed systolic function.    3 - Left ventricular diastolic dysfunction.    4 - Right atrial enlargement.    5 - Severe tricuspid regurgitation.    6 - Pulmonary hypertension. The estimated PA systolic pressure is 86 mmHg.    7 - Increased central venous pressure.    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    Metabolic acidosis, IAG, reduced excretion of inorganic acids     Myoclonic jerking 9/20/2016    Noncompliance with  medication regimen 12/4/2018    Secondary hyperparathyroidism (of renal origin)     Secondary pulmonary hypertension 3/23/2017    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    TB lung, latent 08/25/2015    Negative Quantiferon Gold 3-23-17       Past Surgical History:   Procedure Laterality Date    COLONOSCOPY      COLONOSCOPY N/A 4/4/2017    Procedure: COLONOSCOPY;  Surgeon: Walker Stern MD;  Location: [facility] (2ND FLR);  Service: Endoscopy;  Laterality: N/A;  PA Systolic Pressure 85.56. HD Patient MWF, K+ lab prior to procedure.     ESOPHAGOGASTRODUODENOSCOPY N/A 6/12/2018    Procedure: EGD (ESOPHAGOGASTRODUODENOSCOPY);  Surgeon: Man Galicia MD;  Location: [facility] (2ND FLR);  Service: Endoscopy;  Laterality: N/A;  EGD in 8-12 weeks with Dr. Galicia on 4th floor for follow up erosive esophagitis and Mejia's surveillance.    Patient should be on Pantoprazole 40mg every 12 hours or the equivulant of another PPI    awaiting for patient to reply back regarding changing    ESOPHAGOGASTRODUODENOSCOPY N/A 3/7/2019    Procedure: EGD (ESOPHAGOGASTRODUODENOSCOPY);  Surgeon: Man Galicia MD;  [facility]  Service: Endoscopy;  Laterality: N/A;    ESOPHAGOGASTRODUODENOSCOPY N/A 9/23/2019    Procedure: EGD (ESOPHAGOGASTRODUODENOSCOPY);  Surgeon: Keanu Rainey MD;  Location: [facility] (2ND FLR);  Service: Endoscopy;  Laterality: N/A;    ESOPHAGOGASTRODUODENOSCOPY N/A 10/2/2019    Procedure: EGD (ESOPHAGOGASTRODUODENOSCOPY);  Surgeon: Kevin De La Paz MD;  Location: [facility] (2ND FLR);  Service: Endoscopy;  Laterality: N/A;    ESOPHAGOGASTRODUODENOSCOPY N/A 11/12/2019    Procedure: EGD (ESOPHAGOGASTRODUODENOSCOPY);  Surgeon: Kevin De La Paz MD;  Location: [facility] (2ND FLR);  Service: Endoscopy;  Laterality: N/A;    FOOT AMPUTATION THROUGH METATARSAL      left foot    LEG AMPUTATION THROUGH KNEE  12/18/2013    left BKA    R AVF  9/12/12    UPPER GASTROINTESTINAL  "ENDOSCOPY       Prior diet: Per last speech not 2/1 pt rec'd NPO.      Subjective     Awake/alert  "I'm starving"    Pain/Comfort:  · Pain Rating 1: 0/10  · Pain Rating Post-Intervention 1: 0/10    Objective:     Oral Musculature Evaluation  · Oral Musculature: WFL  · Dentition: scattered dentition  · Secretion Management: other (see comments)(Generalized drooling)  · Oral Labial Strength and Mobility: WFL  · Lingual Strength and Mobility: WFL  · Volitional Cough: decreased strength  · Volitional Swallow: GONZALO 2/2 dec'd comprehension  · Voice Prior to PO Intake: clear    Bedside Swallow Eval:   Consistencies Assessed:  · Thin liquids x8 oz cup/straw  · Puree x3  · Solids x5     Oral Phase:   · WFL   · Mild prolonged mastication of cracker    Pharyngeal Phase:   · no overt clinical signs/symptoms of aspiration        Assessment:     Vaughn Retana is a 54 y.o. male with an SLP diagnosis of Dysphagia.     Goals:   Multidisciplinary Problems     SLP Goals        Problem: SLP Goal    Goal Priority Disciplines Outcome   SLP Goal     SLP Ongoing, Progressing   Description:  Speech Language Pathology  Goals expected to be met by 2/17:  1. Pt will participate in ongoing assessment of swallow to determine safest, least restrictive diet.                      Plan:     · Patient to be seen:  4 x/week   · Plan of Care expires:  02/29/20  · Plan of Care reviewed with:  patient   · SLP Follow-Up:  Yes       Discharge recommendations:  nursing facility, skilled       Time Tracking:     SLP Treatment Date:   02/16/20  Speech Start Time:  1045  Speech Stop Time:  1055     Speech Total Time (min):  10 min    Billable Minutes: Eval Swallow and Oral Function 10    Kaylee Jackson CCC-SLP  02/16/2020           "

## 2023-09-25 NOTE — OR NURSING
PSR:     Please call patient and offer a hospital follow up with Errol on 10/24 at 330 pm (This is Dr. Norton's first open apt slot, please notify patient and if patient accepts and would like RN to watch for cancellation, please note and send back to RN)    *please send back to RN if unable to schedule.       *You may also offer a hospital follow up with BARNEY Foster, please notify patient Dr. Norton will also be inf office this day- if She would like to be see sooner.   Apt is at [facility] on 10/16/23 at 0830 am.      Pt refused to change his clothes.

## 2023-10-13 NOTE — ASSESSMENT & PLAN NOTE
- Nutrition consulted  - On Novasource @ 30 mL/hr to provide 1440 kcals, 65 g of protein, 516 mL fluid.    verbal instruction/written material

## 2023-12-01 NOTE — PROGRESS NOTES
ENCOUNTER    Visit Type Initial Visit  Location: [facility]    Barriers to Communication / Understanding:   [] Language [] Vision [] Hearing [] Other      []  Present     Accompanied By: Tanesha Patel    Organ For Transplant:  Kidney    Ethnicity:  White    ADLs Fully Independent      Instrumental ADLs Fully Independent      Level of Activity Active      DME None     Knowledge of Health Good  Kidney Disease, ADHD, Bipolar Disorder, Hypertension    Why Do You Have End Stage Organ Disease Hypertension    Knowledge of Transplant / VAD:  Yes Patient Is Able To Make An Informed Decision    Patient Understands the Risks of Transplant / VAD  Yes Rejection  Yes Infection Yes Complications  Yes Death      Patient Understands Recovery and Follow-Up from Transplant / VAD  Yes Length Of Stay Yes Appointments  Yes Labs  Yes Rehabilitation      Patient Has Identified Goals of Transplant / VAD Yes  Pt shared his goal for transplant to to get back to normal life and participate in activities with his son.    Any Potential Donors? None at this time    Overall Compliance  Good       Compliance With Medications Good    Managed By Patient  Pt's wife puts them in a pillbox, so pt doesn't forget to take them.   Pt stated he sometimes misses afternoon medications - three times per month.    Understanding Of Medication s      Fair  Compliance With Appointments Good    How Does Patient Handle Health Problems    Organ  Kidney    IOP:     Fluid Restriction:   No [] Compliant     Medical And Clinic Appointment Compliant     Dialysis:  [] What Dialysis Center [facility] [] Began May 2023      [x] In Center  M, W, F [] Home Hemo [] Peritoneal       Attendance:  Treatment Attendance Good Treatment Time 4 hours   Pt stated dialysis days are being switched to better accommodate their family needs.    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 The patient is currently intubated, therefore, HPI will come from Epic charting. According to the notes, the patient was transferred here after being found Monday morning (1/20) confused and lethargic with a moderate amount of brown, colored emesis at bedside by the nursing home staff. The nursing staff reported that he appeared normal the day before. In the ED, initial work up was found to be unremarkable. UA negative for UTI. Lactic acid was mildly elevated at 2.4. Alcohol and drug screen negative. The patient underwent a CTH, which showed no acute intraparenchymal hemorrhage or major vascular distribution, senescent changes, and remote lacunar type basal ganglia infarct. However, after the CT, the patient had a episode of emesis (?coffee ground) and a witnessed tonic clonic seizure. He was also noted to be hypertensive (SBP >200s). The patient was intubated for airway protection and started on propofol and Cardene drip for hypertension (now off). He was also given Ativan and Keppra in the ED. He is now on a continuous EEG. GI was consulted for a UGI bleed. The patient's Hgb is currently 13.9. GI with no plans for endoscopic interventions at this time.     The patient was last dialyze on Friday (1/17) at his outpatient dialysis unit. He is a MWF dialysis patient of Dr. Lemus at [facility].  According to the records, he typically dialyzes for 3.5 hrs per a RICHARD AVF. Nephrology consulted for management of ESRD and HD treatment.    Interval History: HD completed yesterday. Net UF 1.5 L. No electrolyte derangements noted.   Patient remains intubated, appears more alert on AM assessment, nodding appropriately to yes/no questions. Passed SBT yesterday.     Review of patient's allergies indicates:   Allergen Reactions    Fosrenol [lanthanum] Nausea And Vomiting     Nausea and vomiting     Current Facility-Administered Medications   Medication Frequency    0.9%  NaCl infusion Once    acetaminophen tablet 650 mg Q6H PRN    carvedilol tablet 3.125 mg BID    chlorhexidine 0.12 % solution 15 mL BID    dextrose 10% (D10W) Bolus PRN    glucagon (human recombinant) injection 1 mg PRN    levETIRAcetam in NaCl (iso-os) IVPB 500 mg Q12H    pantoprazole injection 40 mg Q12H    sevelamer carbonate pwpk 1.6 g TID    sodium chloride 0.9% flush 10 mL PRN       Objective:     Vital Signs (Most Recent):  Temp: 98.6 °F (37 °C) (01/30/20 0715)  Pulse: 99 (01/30/20 0800)  Resp: 18 (01/30/20 0800)  BP: (!) 99/53 (01/30/20 0800)  SpO2: 98 % (01/30/20 0800)  O2 Device (Oxygen Therapy): ventilator (01/30/20 0739) Vital Signs (24h Range):  Temp:  [97.5 °F (36.4 °C)-98.6 °F (37 °C)] 98.6 °F (37 °C)  Pulse:  [] 99  Resp:  [7-21] 18  SpO2:  [97 %-100 %] 98 %  BP: ()/(46-96) 99/53     Weight: 57 kg (125 lb 10.6 oz) (01/29/20 1200)  Body mass index is 20.28 kg/m².  Body surface area is 1.63 meters squared.    I/O last 3 completed shifts:  In: 972.5 [I.V.:82.5; Other:300; NG/GT:290; IV Piggyback:300]  Out: 2100 [Other:2100]    Physical Exam   Constitutional: He appears well-nourished. He appears ill. He is intubated.   HENT:   Head: Normocephalic and atraumatic.   Mouth/Throat: No oropharyngeal exudate.   Eyes: Right eye exhibits no discharge. Left eye exhibits no discharge. No scleral icterus.   Neck: Normal range of  Urinate  [] Fluid Restriction [] IDWG [] EDW  Achieved Dry Weight        Diet:   Patient is compliant with renal restrictions    Yes Patient is compliant with low sodium diet      Labs:    [] Patient Reported [] Collateral       []  Potassium [] Albumin [] Phosphorus      # of Binders:  [x] # of Binders per meal  4 [x] Meals per day 2      [x]  # of Binders per Snack  4 [x] Snacks per day 1 [] Phosphorus     Pancreas:  [] Checks blood sugar      times/day [] A1c   Hypoglycemic Episodes  Unawareness  Outside Interventions    Liver:  Is Lactulose prescribed  Dose:   Timesper day:  Is patient compliant       SOCIAL HISTORY  No       Education: HS Diploma    Literate Yes IEP  Computer literate Yes  Internet access Yes       Sources of Income:  SSD  Patient's Current Employment    [] Full-Time [] Part-Time [] Unemployed [] Retired     []  None [] Not working by choice [x] Not working disabled     [] Short Term Leave   [] Other   Employment History - Pt was previously employed in [employer].    Will patient have paid status from employment during recovery No      Spouse / SO Current Employment Disabled     Will spouse / SO have paid status from employment during recovery No      Other Sources of Income       Does patient have financial concerns No     Is patient able to meet current monthly expenses Yes    Resources:      Patient was provided information on transplant fundraising.  No      Insurance:  Primary Insurance [facility]    Secondary Insurance     Prescription Coverage Copay cost per month $0    Medicare coverage due to     Medicare Part A  Effective date    Medicare Part B  Effective date    Medicare Part C  Deductable  Out of Pocket Max    Medicare Part D  Extra Help?    Medicaid  Waiver  Redetermination Date    HMO       FAMILY SYSTEM    Single    Yes How long  9  Describe Relationship  Good    How long   Describe Relationship    When    When  In a  Relationship   How Long  Describe Relationship    Spouse / SO Name Amanda  Age 42   Health   Okay  Other Caregiver Responsibilities  9yo son  Spouse / Significant others reaction to donation    Children:  Yes # Biological 1 son   # Adopted   No # Step Children        Child #1 Name Adalberto  Age 8  Health Good     Lives Local  How Much Contact Daily      Child #2 Name  Age   Health    Lives   How Much Contact     Child #3 Name Age  Health  Lives   How Much Contact     Parents:  Raised By Both Biological Parents    Did the patient have contact with the other parent N/A    Mother  No Age   Cause of Death   Father  No Age   Cause of Death    Living Parent #1  Name Kailey, Age 53, Health Good  Living Parent #2  Name Derik, Age 64, Health Good    Additional Information    Siblings:  [x] # Biological 1 brother, 1 sister [] # Half Siblings [] # Step Siblings     Sibling #1   Sibling #2     Support & Recovery Plan:  Yes Adequate    Primary Support:  Franco Patel Phone [phone]  Age 42  Relationship to Patient Wife  If employed, can they take time off work Disabled   If so, is it paid time off    If not, will this impact your finances No   Did they attend education classes Yes  Do they have other caregiver responsibilities (child or eldercare) Yes, 9yo son.  Do they have their own conditions which may prevent them from providing care for you No  (Medical, psychological, physical limitations)  Pt's wife has DM.  Are they available on short notice Yes   Are they reliable Yes   Are they responsible Yes   Are they able to understand and process new information Yes   Do they have reliable transportation or will you allow them to use your vehicle Yes   Are they currently involved in your care Yes   Comments    Secondary Support:  Franco Bonner Phone  [phone] Age 52  Relationship to Patient Mother   If employed, can they take time off work Yes   If so, is it paid time off Yes   If not, will this impact  motion. Neck supple. No JVD present.   Cardiovascular: Regular rhythm and normal heart sounds.   No murmur heard.  Pulmonary/Chest: He is intubated. He has no rales.   Abdominal: Soft. Bowel sounds are normal. He exhibits no distension.   Musculoskeletal: Normal range of motion. He exhibits deformity (LBKA). He exhibits no edema.   Left below knee amputation   Neurological: He is alert.   Skin: Skin is warm and dry.   Vitals reviewed.      Significant Labs:  CBC:   Recent Labs   Lab 01/30/20  0604   WBC 8.89   RBC 4.43*   HGB 12.3*   HCT 39.4*      MCV 89   MCH 27.8   MCHC 31.2*     CMP:   Recent Labs   Lab 01/30/20  0259   GLU 72   CALCIUM 10.4   ALBUMIN 2.6*   PROT 8.9*      K 4.1   CO2 18*      BUN 43*   CREATININE 5.5*   ALKPHOS 219*   ALT 34   AST 33   BILITOT 0.5          Assessment/Plan:     * Seizure  - per primary team     ESRD on hemodialysis  The patient is a 55 yo AAM admitted to MICU with seizure activity after presenting to the ED on 1/20 after being found with alter mental status at his NH facility Monday morning. The patient is currently on continuous EEG monitoring. Last dialyze on MWF.     Nephrology History  iHD Schedule: MWF  Unit/MD: [facility]/ Dr. Lemus  Duration: 3.5 hrs  EDW: ?  Access: RICHARD AVF   Resodial Renal Function: Yes (per records)    Plan:   - No urgent need for dialysis today.   - Daily renal function panels   - Renal diet or Novasource TF   - On mechanical ventilation with FiO at 21%  - Continue to monitor intake and output, daily weights   - Avoid nephrotoxic medication and renal dose medications to GFR  - Will discuss with       Thank you for your consult. I will follow-up with patient. Please contact us if you have any additional questions.    Glenis Benavidez DNP, FNP-C  Nephrology  [facility]   your finances No   Did they attend education classes No   Do they have other caregiver responsibilities (child or eldercare) No   Do they have their own conditions which may prevent them from providing care for you No  (Medical, psychological, physical limitations)    Are they available on short notice Yes   Are they reliable Yes   Are they responsible Yes   Are they able to understand and process new information Yes   Do they have reliable transportation or will you allow them to use your vehicle Yes   Are they currently involved in your care Yes   Comments    Alternate Support  Alternate Support    Housing:  Yes Adequate Rents home  Type of Home House  Distance to [facility] 45 minutes  Pets 2 dogs, 2 birds, ferret, guinea pig  Does Patient Feel Safe in Home Yes      Transportation:  Yes Adequate  # Licensed Drivers in the Home 1  Does Patient Drive No If not, why - Pt stated he can't pass the test.   # Reliable Vehicles  1  Does Patient use Public Transportation No  Does Patient use Medical Transportation No  Comments    MENTAL HEALTH      Cognition:  No impairment observed / reported    The patient reports their mood as good.     Reported Mental Health Diagnosis Bipolar Disorder, ADHD    Family History of Mental Health Concerns None   What are patient psychosocial stressors?  Pt shared he is stressed by the amount of medications he has to take.       Current Medications:  Yes  Mental Health Meds  Depakote, Hydroxyzine  Rx'd by PCP  Sleep Meds None  Rx'd by   Pain Meds None  Rx'd by     OTC Meds Tylenol  Past Medications Strattera    Counseling Previously  [facility] in 2009    Has patient ever been hospitalized for mental health reasons Yes   Was the hospitalization voluntary - Pt was a minor. Duration   Where  [place]  When  2 yo  Describe situation  Pt reported he was hospitalized and diagnosed with Bipolar Disorder and ADHD when he was 2 yo.    Discharge Plan for Follow Up  Was Discharge Plan Completed    Referral to Transplant Psych Yes  Mental Health Follow Up Required No     Suicide Assessment:  History of Suicide Ideation No  [] Timeframe [] Frequency   Frequency   Plan Created  Intent to Follow Through  Outcome      History of Suicide Attempt No     History of Suicidal Ideation in the past 3 months No Intensity   Duration     Description of Plan      Plans thought of  Intent to Follow Through  Highest Level of Intent to Follow Through    Current Plan for Safety    Plan for Follow-Up    Patient's Reported Trauma History:  None     What are patient's coping behaviors:  Pt shared he plays video games and talks to his sister.    [employer]ist / Spirituality Pt denied being Bahai or spiritual.     Attitude toward interviewer Irritable    Eye Contact Patient maintained good eye contact throughout appointment    Appearance The patient was neatly groomed, appropriately dressed and adequately nourished    Affect Tense    Thought Process Appropriate    Substance Use /Abuse History:    Current Tobacco User Yes  Patient uses Cigarettes  Tobacco Frequency Occasionally 1 or 2 per day  For How Long  Is Patient Required to Quit     Former Tobacco User Yes  Describe past tobacco use and date quit  Pt used chewing tobacco - one can per day.    Current Alcohol User Yes  Type of Alcohol Used  Beer, Whiskey Amount 1-2 Frequency Not very often  Pattern of Alcohol Use    Is Patient Required to Quit   Continued to use the substance despite being told the substance is affecting their health    History of problems at work, school or home due to substance use      Former Alcohol User No  Describe past alcohol use and date quit      Has patient ever gone to CD treatment No  If yes, When, Where and What type of Program  Attends AA meetings    Sponsor  Do support people drink alcohol Yes  If yes, describe support people's use Socially  Is alcohol kept in the home Yes  Does Patient need to sign a CD contract No    Current Illegal /  "Unprescribed Drug User No  Type of Illegal Drug Used   Frequency  Pattern of Drug Use    Is Patient Required to Quit     Illegal / Unprescribed Drug #2  Type of Illegal Drug Used   Frequency  Pattern of Drug Use      Continue to use the substance despite being told the substance is affecting their health    History of problems at work, school or home due to substance use      Former Illegal / Unprescribed Drug User Yes  Describe past illegal drug use and date quit  Pt shared he used \"an eighth\" of Cocaine a couple of days per week for 3 months.      Has patient ever gone to CD treatment No  If yes, When, Where and What type of Program   Attends AA/NA  meetings    Is patient on a Methadone / Suboxone regiment No  Do support people use illegal drugs No  If yes, describe support people's use  Are illegal drugs kept in the home No  Does Patient need to sign a CD contract No    Illegal / Unprescribed Drug #2  Type of Illegal Drug Used   Frequency  Pattern of Drug Use    Prescription Drug Abuse:  No Has patient experienced feelings of addiction  No Has patient experienced symptoms of withdrawal  No Has patient experienced any side effects? e.g.  hallucinations or delusions    Does Patient Meet the Criteria for Alcohol Use Disorder No Diagnosis  Does Patient Meet [facility] guidelines  N/A  Does Patient Meet the Criteria for Illegal Drug Use Disorder No Diagnosis  Does Patient Meet [facility]OTC guidelines  N/A    [facility] Substance Relapse Risk Factors   DSM-5 Severity Factors:     IOP     LEGAL ISSUES  No Arrests  No Currently probation or parole No   nursing home No  When   How long   Where       Citizenship:  Yes [place] Citizen  No Green Card No Visa    Advance Directives: No  Documents Provided    Guardian:    STEVE PEREIRA met with pt and pt's wife, Amanda for an initial psychosocial assessment.  Pt's wife provided most history and was pleasant and engaged.  Pt reported he is independent with his ADL's/IADL's and active at home.  " Pt reported the cause of his end stage kidney disease is due to Hypertension.  Pt reported his goal for transplant is to get back to normal life and be able to participate in activities with his son.  Pt's wife assists in managing his medications and fills his pillbox.  Pt reported he sometimes misses his afternoon medications, but is mostly compliant.  Pt attends dialysis at [facility] on M, W, F and runs for 4 hours.  Pt stated he has missed some treatments and will be switching days for dialysis that is more conducive to his family life.  Pt is  and has one 9 yo son.  He receives SSD.  Pt's wife is also on SSD.  Pt has [facility] for insurance.  He denied having any financial concerns or difficulty obtaining prescription medications.  Pt named his wife, Amanda ([phone]) as his primary support and his Mother, Kailey ([phone]) as his secondary support.  Supports drive, have reliable transportation, and are available on short notice.  Pt reported having a history of Bipolar Disorder and ADHD.  He is prescribed Depakote and Hydroxyzine by his PCP.  Pt reported he previously attended counseling through [facility] in 2009.  Pt reported he was hospitalized at age 2 in [place] and diagnosed with Bipolar Disorder and ADHD.  He denied other psychiatric hospitalizations, suicide attempts or suicidal ideation.  PHQ-9 was administered and pt scored a “10” which indicates moderate depression.  PAM-7 was administered and pt scored a “5” which indicates mild anxiety.  Pt shared he is stressed by the number of medications he has to take.  Pt reported he dwain by playing video games and talking to his sister.  Pt shared he occasionally will have 1 to 2 cigarettes daily.  He shared he used to use one can of chewing tobacco per day.  Pt reported alcohol use of 1-2 drinks not very often.  Pt denied current illicit substance use.  Pt reported using “an eighth” of Cocaine a couple of days  per week for approximately 3 months, two years ago.  Pt denied having any legal history.  SW provided Advanced Directives documents.  Transplant Care Agreement was signed.  Pt's SIPAT score is “30” which indicates pt is a minimally acceptable candidate.  Consider listing.  Identified risk factors must be satisfactorily addressed before representing for consideration.  Pt's risk factors include psychiatric history, past substance use, and current nicotine use.  SW recommends pt see Transplant Psychologist.    PLAN  SW to confirm secondary support via phone.  SW will follow up with pt in 6 to 12 months.

## 2024-04-11 NOTE — DISCHARGE SUMMARY
[facility] General Surgery   Maikel Billingsley MD, FACS  Lisa JONESBobbi Ranjeet, APRN-CNP  [address], [address]  [place] [address]  P: [phone], F: [phone]    General and Robotic Surgery  Consult Note               PATIENT NAME: Navin Suarez III   :  1984   MRN: [id number]   PCP:  Judith Mann MD     TODAY'S DATE: 2024    Chief Complaint   Patient presents with    Surgical Consult     Discuss PEG exchange       HISTORY OF PRESENT ILLNESS: 39 y.o. male presents to discuss PEG tube exchange. Mother presents with patient. PEG tube was placed at the [facility], was placed about a year ago. The PEG tube is functioning without any issue. Mother states that there is mold growing within the distal aspect of the tubing. Patient with Percutaneous gastrostomy tube placement (18 Fr) placed on 23. Patient does not take anything by mouth r/t dx of ALS. Now following at [facility] r/t ALS.     Major medical hx: ALS, restrictive lung disease.  Prior ABD/pelvic surgeries: Appendectomy.   Blood thinning medications: None.    PAST MEDICAL HISTORY     Past Medical History:   Diagnosis Date    ALS (amyotrophic lateral sclerosis) (HCC)     Cleft lip     Foot drop, bilateral        PROBLEM LIST     Patient Active Problem List   Diagnosis    Cleft lip    Elevated CPK    Weakness of both lower extremities    Vitamin D deficiency    Foot drop, bilateral    Abnormal EMG    Spinal stenosis of lumbar region with radiculopathy    Neuropathy, peripheral, hereditary    ALS (amyotrophic lateral sclerosis) (HCC)    Tinea versicolor    Chronic neuromuscular respiratory failure (HCC)    Dysphagia    Falls frequently    Motor polyneuropathy    Restrictive lung disease    Malnutrition (HCC)    S/P percutaneous endoscopic gastrostomy (PEG) tube placement (HCC)       SURGICAL HISTORY       Past Surgical History:   Procedure Laterality Date    APPENDECTOMY      CLEFT LIP REPAIR      MANDIBLE  [facility]  Discharge Summary  [facility] Medicine    Patient Name: Vaughn Retana  YOB: 1965    Admit Date: 2/19/2020    Discharge Date and Time: 2/21/2020  6:14 PM    Discharge Attending Physician: Jaskaran Colon MD     Team: [facility] MED G    Reason for Admission:   Chief Complaint   Patient presents with    Emesis     coffee ground emesis, was given 8mg zofran at 3pm discharged yesterday, from [facility]       Active Hospital Problems    Diagnosis  POA    *Hematemesis [K92.0]  Unknown    Gastric outlet obstruction [K31.1]  Yes    Acute upper GI bleed [K92.2]  Yes    ESRD on dialysis [N18.6, Z99.2]  Not Applicable    GERD with esophagitis [K21.0]  Yes    Renovascular hypertension [I15.0]  Yes     Chronic    Chronic diastolic heart failure [I50.32]  Yes     Chronic     2-23-17    1 - Low normal to mildly depressed left ventricular systolic function (EF 50-55%).     2 - Right ventricular enlargement with mildly depressed systolic function.     3 - Left ventricular diastolic dysfunction.     4 - Right atrial enlargement.     5 - Severe tricuspid regurgitation.     6 - Pulmonary hypertension. The estimated PA systolic pressure is 86 mmHg.     7 - Increased central venous pressure.       Anemia in chronic kidney disease [N18.9, D63.1]  Yes     Chronic      Resolved Hospital Problems   No resolved problems to display.       HPI:   53 yo M with PMHx multiple instances of GI bleed (just discharged yesterday underwent EGD while admitted 2/14), ESRD on dialysis MWF, L below knee amputation 2/2 osteomyelitis, HFpEF, GERD, and h/o multiple ICH w/o residual deficits presents to the ED from SNF after he was noted to have coffee ground emesis. The patient is a poor historian and is unable to quantify the amount or give timing of when the vomiting occurred. The patient states he has not eaten in 4 days, but records show that he has been having some PO intake. He is uncertain of  when his PEG tube was used last. He denies any active nausea or abdominal pain. He does not know ehen his last BM was. He reports being hungry and repeatedly asks for food but does not provide and other history. In the ED, the patient's PEG tube was placed to gravity with ~50 cc of coffee ground output.     Hospital Course:   Admitted to hospital medicine for reported coffee ground emesis as per EMS/SNF. Hgb appears stable, and note patient recently had a scope done by GI (2/14) with showed non bleeding erosive gastropathy, and hiatal hernia. Discussed case with GI in ED, will start IV PPI BID and kept NPO. GI seen patient and since had EGD done recently with stable H/H, no need for EGD/colonosocopy at this time. Suspecting symptoms from ongoing gastroparesis and erosive gastropathy. GI rec conservative treatment with observation, antiemetics, IVF and Protonix. His hgb remained stable. Started back on TF, and tolerated well. He is stable for discharge with PPI 40 mg BID.    Note patient overall mental baseline is slow and he is A0x2-3 generally. He answers slow and is lethargic from his chronic medical conditions.     Principal Problem: Hematemesis    Other Problems Addressed:  Coffee Ground Emesis  Hx of GI Bleed  Oropharyngeal dysphagia  Seizures  Renovascular hypertension  ESRD on HD:  Chronic diastolic heart failure  Severe malnutrition      Procedures Performed: * No surgery found *    Special Care, Treatment, and Services Provided: none    Consults: GI    Significant Diagnostic Studies:  No results found for: EF  Hemoglobin A1C   Date Value Ref Range Status   02/06/2020 4.7 4.0 - 5.6 % Final     Comment:     ADA Screening Guidelines:  5.7-6.4%  Consistent with prediabetes  >or=6.5%  Consistent with diabetes  High levels of fetal hemoglobin interfere with the HbA1C  assay. Heterozygous hemoglobin variants (HbS, HgC, etc)do  not significantly interfere with this assay.   However, presence of multiple variants  "may affect accuracy.     11/09/2019 4.0 4.0 - 5.6 % Final     Comment:     ADA Screening Guidelines:  5.7-6.4%  Consistent with prediabetes  >or=6.5%  Consistent with diabetes  High levels of fetal hemoglobin interfere with the HbA1C  assay. Heterozygous hemoglobin variants (HbS, HgC, etc)do  not significantly interfere with this assay.   However, presence of multiple variants may affect accuracy.       All diagnostic labs and imaging reviewed     Final Diagnoses: Same as principal problem.    Discharged Condition: stable  Face to face services were provided on 2/22/2020   Time Spent:  I spent > 30 minutes on the discharge, which included reviewing hospital course with patient/family, reviewing discharge medications, and arranging follow-up care.  Physical Exam on 2/22/2020:  BP 93/66 (BP Location: Left arm, Patient Position: Lying)   Pulse 92   Temp 98.4 °F (36.9 °C) (Oral)   Resp 16   Ht 5' 6" (1.676 m)   Wt 53.4 kg (117 lb 11.6 oz)   SpO2 98%   BMI 19.00 kg/m²     General appearance: no distress, pt resting comfortably, lethargic at baseline, Aox2-3 intermittently at baseline   Mental status: Alert and oriented x 2-3 intermittently   HEENT:  conjunctivae/corneas clear, PERRL  Neck: supple, thyroid not enlarged  Pulm:   normal respiratory effort, CTA B, no c/w/r  Card: RRR, S1, S2 normal, no murmur, click, rub or gallop  Abd: soft, NT, ND, BS present; PEG tube with C/D/I  Ext: no c/c/e  Pulses: 2+, symmetric  Skin: color, texture, turgor normal. No rashes or lesions  Neuro: grossly unremarkable. Globally weak at baseline    Disposition: SNF    Follow Up Instructions:   Follow-up Information     [facility].    Contact information:  [address] [place] [id number] [phone]               No future appointments.    Medications:    Discharge Medication List as of 2/21/2020  5:43 PM      START taking these medications    Details   hydrALAZINE (APRESOLINE) 50 MG tablet Take 0.5 " tablets (25 mg total) by mouth every 8 (eight) hours as needed (for SBP > 170)., Starting Fri 2/21/2020, Until Sat 2/20/2021, Normal         CONTINUE these medications which have CHANGED    Details   midodrine (PROAMATINE) 5 MG Tab Take 1 tablet (5 mg total) by mouth every Mon, Wed, Fri. Please take 30 min before dialysis on Monday Wednesday and Friday as needed, Starting Fri 2/21/2020, Normal         CONTINUE these medications which have NOT CHANGED    Details   acetaminophen (TYLENOL) 325 MG tablet Take 2 tablets (650 mg total) by mouth every 8 (eight) hours as needed., Starting Tue 8/27/2019, OTC      levETIRAcetam (KEPPRA) 100 mg/mL Soln Take 2.5 mLs (250 mg total) by mouth 2 (two) times daily., Starting Tue 2/18/2020, Until Wed 2/17/2021, Normal      ondansetron (ZOFRAN) 8 MG tablet Take 1 tablet (8 mg total) by mouth every 12 (twelve) hours as needed for Nausea., Starting Sat 9/7/2019, Normal      pantoprazole (PROTONIX) 40 MG tablet Take 1 tablet (40 mg total) by mouth 2 (two) times daily., Starting Tue 11/12/2019, Until Wed 11/11/2020, Normal      promethazine (PHENERGAN) 25 MG tablet Take 1 tablet (25 mg total) by mouth every 6 (six) hours as needed for Nausea., Starting Fri 11/22/2019, Print      RENAPLEX-D 800 mcg-12.5 mg -2,000 unit Tab Take 1 tablet by mouth once daily., Starting Thu 8/23/2018, Historical Med      sevelamer carbonate (RENVELA) 800 mg Tab Take 1 tablet (800 mg total) by mouth 3 (three) times daily with meals., Starting Wed 10/2/2019, Until Thu 10/1/2020, Normal         STOP taking these medications       amoxicillin-clavulanate 500-125mg (AUGMENTIN) 500-125 mg Tab Comments:   Reason for Stopping:               Discharge Instructions: As per SNF plan of care    Jaskaran Colon MD  Department of Hospital Medicine

## 2024-05-19 NOTE — PLAN OF CARE
Pt not medically ready, spiked temp, overnight will cont to follow.  POC: return to [facility]     08/10/20 1842   Discharge Reassessment   Assessment Type Discharge Planning Reassessment   Provided patient/caregiver education on the expected discharge date and the discharge plan Yes   Do you have any problems affording any of your prescribed medications? No   Discharge Plan A Return to nursing home   DME Needed Upon Discharge  none   Anticipated Discharge Disposition snf Nu   Can the patient/caregiver answer the patient profile reliably? No, cognitively impaired   How does the patient rate their overall health at the present time? Fair   Describe the patient's ability to walk at the present time. Major restrictions/daily assistance from another person   How often would a person be available to care for the patient? Whenever needed   Number of comorbid conditions (as recorded on the chart) Three   Post-Acute Status   Post-Acute Authorization Placement   Post-Acute Placement Status Awaiting Internal Medical Clearance   Discharge Delays None known at this time   Flaquita Carbajal, MSN  Case Management  Ext [phone]    show

## 2024-07-23 NOTE — ED PROVIDER NOTES
Encounter Date: 4/22/2020       History     Chief Complaint   Patient presents with    Shortness of Breath     + increased OSB, Covid + x three weeks ago, 100% oxygen sat on 2 L via [place], 94% RA from [facility].      54-year-old male was transferred back from nursing home or persistent shortness of breath.  He has a history of intracerebral hemorrhage hemodialysis.  He was recently admitted here for corona virus pneumonitis.  Paramedics states that he is at his baseline.  He has been running fevers at the nursing home.  Patient registers no complaints but he does not communicate well as.  This seems to be similar to his prior visit in reviewing the notes.        Review of patient's allergies indicates:   Allergen Reactions    Fosrenol [lanthanum] Nausea And Vomiting     Nausea and vomiting     Past Medical History:   Diagnosis Date    Amputation stump pain 9/10/2013    Aspiration pneumonia 7/27/2015    Asterixis 11/8/2016    C. difficile colitis 8/7/2015    Cholelithiasis without obstruction 8/25/2015    Chronic diastolic heart failure     2-23-17   1 - Low normal to mildly depressed left ventricular systolic function (EF 50-55%).    2 - Right ventricular enlargement with mildly depressed systolic function.    3 - Left ventricular diastolic dysfunction.    4 - Right atrial enlargement.    5 - Severe tricuspid regurgitation.    6 - Pulmonary hypertension. The estimated PA systolic pressure is 86 mmHg.    7 - Increased central venous pressure.    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/2015    Metabolic acidosis, IAG, reduced excretion of inorganic acids     Myoclonic jerking 9/20/2016    Noncompliance with medication regimen 12/4/2018    Secondary hyperparathyroidism (of renal origin)     Secondary pulmonary hypertension 3/23/2017    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    TB lung, latent 08/25/2015    Negative Quantiferon Gold 3-23-17     Past Surgical History:   Procedure Laterality Date    COLONOSCOPY      COLONOSCOPY N/A 4/4/2017    Procedure: COLONOSCOPY;  Surgeon: Walker Stern MD;  Location: [facility] (2ND FLR);  Service: Endoscopy;  Laterality: N/A;  PA Systolic Pressure 85.56. HD Patient MWF, K+ lab prior to procedure.     ESOPHAGOGASTRODUODENOSCOPY N/A 6/12/2018    Procedure: EGD (ESOPHAGOGASTRODUODENOSCOPY);  Surgeon: Man Galicia MD;  [facility]  Service: Endoscopy;  Laterality: N/A;  EGD in 8-12 weeks with Dr. Galicia on 4th floor for follow up erosive esophagitis and Mejia's surveillance.    Patient should be on Pantoprazole 40mg every 12 hours or the equivulant of another PPI    awaiting for patient to reply back regarding changing    ESOPHAGOGASTRODUODENOSCOPY N/A 3/7/2019    Procedure: EGD (ESOPHAGOGASTRODUODENOSCOPY);  Surgeon: Man Galicia MD;  [facility]  Service: Endoscopy;  Laterality: N/A;    ESOPHAGOGASTRODUODENOSCOPY N/A 9/23/2019    Procedure: EGD (ESOPHAGOGASTRODUODENOSCOPY);  Surgeon: Keanu Rainey MD;  Location: [facility] (2ND FLR);  Service: Endoscopy;  Laterality: N/A;    ESOPHAGOGASTRODUODENOSCOPY N/A 10/2/2019    Procedure: EGD (ESOPHAGOGASTRODUODENOSCOPY);  Surgeon: Kevin De La Paz MD;  [facility]  Service: Endoscopy;  Laterality: N/A;    ESOPHAGOGASTRODUODENOSCOPY N/A 11/12/2019    Procedure: EGD  (ESOPHAGOGASTRODUODENOSCOPY);  Surgeon: Kevin De La Paz MD;  [facility]  Service: Endoscopy;  Laterality: N/A;    ESOPHAGOGASTRODUODENOSCOPY N/A 2/14/2020    Procedure: EGD (ESOPHAGOGASTRODUODENOSCOPY);  Surgeon: Kevin De La Paz MD;  [facility]  Service: Endoscopy;  Laterality: N/A;    FOOT AMPUTATION THROUGH METATARSAL      left foot    LEG AMPUTATION THROUGH KNEE  12/18/2013    left BKA    R AVF  9/12/12    UPPER GASTROINTESTINAL ENDOSCOPY       Family History   Problem Relation Age of Onset    Early death Mother     Kidney disease Father     Hypertension Father     Heart disease Father     Hyperlipidemia Father     Diabetes Brother     Alcohol abuse Maternal Grandmother     Diabetes Maternal Grandfather     Hypertension Sister     Melanoma Neg Hx      Social History     Tobacco Use    Smoking status: Former Smoker     Packs/day: 1.00     Years: 10.00     Pack years: 10.00    Smokeless tobacco: Never Used   Substance Use Topics    Alcohol use: No    Drug use: No     Review of Systems   Constitutional: Positive for fever.   Respiratory: Positive for shortness of breath.    Cardiovascular: Negative for chest pain.   Neurological: Negative for headaches.       Physical Exam     Initial Vitals   BP Pulse Resp Temp SpO2   04/22/20 1836 04/22/20 1819 04/22/20 1819 04/22/20 1819 04/22/20 1819   122/78 (!) 116 16 98.9 °F (37.2 °C) 100 %      MAP       --                Physical Exam    Constitutional:   Patient appears chronically ill contracted   Neck: Normal range of motion.   Cardiovascular:   Tachycardic and regular   Pulmonary/Chest: Breath sounds normal. No respiratory distress. He has no wheezes. He has no rhonchi. He has no rales.   Abdominal: Soft. Bowel sounds are normal. He exhibits no distension. There is no tenderness. There is no rebound.   Musculoskeletal: He exhibits no edema.   Neurological:   Patient moves arms equally.  His legs are  contracted   Skin: Skin is warm. Capillary refill takes less than 2 seconds.         ED Course   Procedures  Labs Reviewed   CBC W/ AUTO DIFFERENTIAL - Abnormal; Notable for the following components:       Result Value    WBC 15.60 (*)     RBC 2.51 (*)     Hemoglobin 7.2 (*)     Hematocrit 23.5 (*)     Mean Corpuscular Hemoglobin Conc 30.6 (*)     RDW 17.6 (*)     Gran # (ANC) 13.0 (*)     Immature Grans (Abs) 0.07 (*)     Mono # 1.3 (*)     Gran% 83.4 (*)     Lymph% 7.9 (*)     All other components within normal limits   SARS-COV-2 RNA AMPLIFICATION, QUAL - Abnormal; Notable for the following components:    SARS-CoV-2 RNA, Amplification, Qual Positive (*)     All other components within normal limits    Narrative:     What symptom criteria does the patient meet?->Difficulty  breathing   PROCALCITONIN - Abnormal; Notable for the following components:    Procalcitonin 5.39 (*)     All other components within normal limits   CULTURE, BLOOD   CULTURE, BLOOD   LACTIC ACID, PLASMA   FERRITIN   TROPONIN I   COMPREHENSIVE METABOLIC PANEL   LACTATE DEHYDROGENASE   CK   C-REACTIVE PROTEIN          Imaging Results          X-Ray Chest AP Portable (Final result)  Result time 04/22/20 19:07:20    Final result by Vishal Laws MD (04/22/20 19:07:20)                 Impression:      As above.      Electronically signed by: Vishal Laws MD  Date:    04/22/2020  Time:    19:07             Narrative:    EXAMINATION:  XR CHEST AP PORTABLE    CLINICAL HISTORY:  dyspnea;    TECHNIQUE:  Single frontal view of the chest was performed.    COMPARISON:  Chest radiograph 03/28/2020    FINDINGS:  Monitoring leads and tubing overlie the chest.  Patient is rotated.  Cardiomediastinal silhouette is midline and similar to prior.    Interval improved aeration of both lungs with residual mild patchy opacities at the left greater than right lung bases from 03/28/2020 exam.  No definite new focal opacity, pleural effusion or  pneumothorax.    Osseous structures appear stable without acute process seen.  Right subclavian vascular stent unchanged.  PA and lateral views can be obtained.                                 Medical Decision Making:   Initial Assessment:   Patient with recent corona virus infection presents with persistent fevers and tachypnea.  I reviewed his labs.  He has no elevated white count.  His chest x-ray shows improving infiltrates.  His EKG was sinus tachycardia without ischemic changes.  His chemistries of hemolyze.  Blood cultures were obtained and he was given broad-spectrum IV antibiotics (vancomycin Zosyn) I discussed case with Internal Medicine.  They would like to hear about his chemistries.  These are being redrawn periods I sign case out to colleague in stable condition.  Plan to admit                                 Clinical Impression:       ICD-10-CM ICD-9-CM   1. Leukocytosis, unspecified type D72.829 288.60   2. Dyspnea R06.00 786.09   3. COVID-19 virus infection U07.1          Disposition:   Disposition: Admitted  Condition: Serious     ED Disposition Condition    Admit                           Luis Miguel Mayer MD  04/22/20 2041     [Joint Pain] : joint pain

## 2024-09-23 NOTE — PLAN OF CARE
Problem: Patient Care Overview  Goal: Plan of Care Review  Outcome: Outcome(s) achieved Date Met: 12/11/18  Patient remained in stable condition this shift. Report called to Jonique and all questions answered.  All belongings packed and patient is waiting on transport.        4 = No assist / stand by assistance

## 2024-12-21 NOTE — CARE UPDATE
Group Therapy Note    Date: 2024    Group Start Time: 900  Group End Time: 930  Group Topic: Group Documentation    Ree Mitchell LPN        Group Therapy Note    Attendees:          Patient's Goal:  ***    Notes:  ***    Status After Intervention:  {Status After Intervention:584521982}    Participation Level: {Participation Level:629638915}    Participation Quality: {[facility] PARTICIPATION QUALITY:423182049}      Speech:  {[facility] CD_SPEECH:84063}      Thought Process/Content: {Thought Process/Content:487741397}      Affective Functioning: {Affective Functionin}      Mood: {Mood:146207287}      Level of consciousness:  {Level of consciousness:250841467}      Response to Learning: {[facility] Responses to Learnin}      Endings: {[facility] Endings:93746}    Modes of Intervention: {[facility] Modes of Intervention:738694504}      Discipline Responsible: {[facility] Multidisciplinary:536547010}      Signature:  Ree Aguilera LPN     Rapid Response Nurse Chart Check     Chart check completed, abnormal VS noted. Bedside RN Freya contacted, no concerns verbalized at this time, instructed to call [phone] for further concerns or assistance.

## 2025-03-10 NOTE — PROGRESS NOTES
[facility] Hospital Medicine  Progress Note    Patient Name: Vaughn Retana  MRN: [id number]  Patient Class: IP- Inpatient   Admission Date: 8/5/2020  Length of Stay: 5 days  Attending Physician: Jono Hutchinson MD  Primary Care Provider: Cori Randall MD    [facility] Medicine Team: [facility] Jono Hutchinson MD    Subjective:     Principal Problem:Sepsis due to pneumonia        HPI:  Vaughn Retana is a 55 year old Black man with renovascular hypertension, diabetes mellitus type 2 with nephropathy, neuropathy, gastroparesis, peripheral vascular disease, status post left foot metatarsal foot amputation in 2010, status post left below-the-knee amputation on 12/18/2013 due to osteomyelitis, hepatitis C, end stage renal disease on hemodialysis (Monday Wednesday Friday), hypotension associated with dialysis (on midodrine), history of left basal ganglia intracranial hemorrhage on 5/6/2013, right basal ganglia hemorrhage on 9/2/2016, right basal ganglia hemorrhage on 11/2/2016, left caudate head hemorrage on 2/22/2017, aphasia, dysphagia status post gastrostomy placement, seizure disorder, chronic diastolic heart failure, anemia, gastroesophageal reflux disease, history of gastrointestinal bleeding (esophagitis on esophagogastroduodenoscopy on 11/12/2019), decubitus ulcers, history of COVID-19 infection on 3/25/2020. He lives at [facility] in [place], [place]. His primary care physician is Dr. Cori Randall.               He was hospitalized at [facility] and discharged to 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               He presented to [facility] the next day with tachycardia, which kept him from getting his routine dialysis. In the emergency department, he was also found to have fever of 102.2° Fahrenheit. He had no hypoxia.  Labs showed leukocytosis (WBC count 91137, from 50982 on 8/3/2020). BNP was elevated at 1748 pg/mL. Troponin was elevated at 0.168 ng/mL. COVID-19 test was negative. Chest X-ray showed new bilateral reticulonodular opacities. He was given piperacillin-tazobactam and vancomycin. He was admitted to Hospital Medicine Team D.    Overview/Hospital Course:  Antibiotics were continued. He was noted to have a stage IV sacral decubitus ulcer, unstageable right leg ulcer, stage III right ankle ulcer, stage III right fourth toe ulcer, right fifth toe ulcer, unstageable right heel ulcer, and dressings to right buttock and hip, right knee, and left below-knee amputation stump. Wound Care was consulted and noted a stage IV left hip decubitus ulcer. Orthopedic Surgery was consulted and did not note any odor or drainage to suggest infection of the left hip ulcer. On 8/7/2020, one of the blood cultures taken on admission grew coagulase-negative Staphylococcus, but it appeared to be a contaminant. Fevers persisted then subsided. He started coughing up thick sputum.     Interval History: Had more fevers. Coughing up sputum.    Review of Systems   Unable to perform ROS: Patient nonverbal     Objective:     Vital Signs (Most Recent):  Temp: 100.1 °F (37.8 °C) (08/10/20 0745)  Pulse: 100 (08/10/20 0915)  Resp: 18 (08/10/20 0915)  BP: 131/72 (08/10/20 0915)  SpO2: 100 % (08/10/20 0700) Vital Signs (24h Range):  Temp:  [99.6 °F (37.6 °C)-101.2 °F (38.4 °C)] 100.1 °F (37.8 °C)  Pulse:  [100-127] 100  Resp:  [16-24] 18  SpO2:  [92 %-100 %] 100 %  BP: (116-180)/(62-86) 131/72     Weight: 57.2 kg (126 lb)  Body mass index is 20.34 kg/m².    Intake/Output Summary (Last 24 hours) at 8/10/2020 0937  Last data filed at 8/10/2020 0300  Gross per 24 hour   Intake 120 ml   Output --   Net 120 ml      Physical Exam  Vitals signs and nursing note reviewed.   Constitutional:       General: He is not in acute distress.     Appearance: He is not  diaphoretic.   Cardiovascular:      Rate and Rhythm: Regular rhythm. Tachycardia present.   Pulmonary:      Effort: Pulmonary effort is normal. No respiratory distress.   Neurological:      Motor: No tremor or seizure activity.         Significant Labs: All pertinent labs within the past 24 hours have been reviewed.    Significant Imaging: I have reviewed all pertinent imaging results/findings within the past 24 hours.       Assessment/Plan:      * Sepsis due to pneumonia  Giving piperacillin-tazobactam, which has good coverage of bacterial pneumonia. Fevers and WBC count fluctuating. Just stopped vancomycin, so do not feel that one day off of it would cause worsening, and would prefer not to have to depend on vancomycin for pneumonia without specific indication. Sputum culture is unable to be obtained due to inability to cooperate. Add doxycycline for empiric MRSA coverage. Add guaifenesin to help him cough up mucus.     Pressure injury of left hip, stage 4  Plastic Surgery planning debridement when they have time.    Nursing home resident  Return to [facility] when medically stable.    End-stage renal disease on hemodialysis  Dialysis per Nephrology.    Gastroparesis  Continue home metoclopramide.    Hemodialysis-associated hypotension  History of renal hypertension  No longer on antihypertensive medications. Continue home midodrine as needed.    History of GI bleed  Continue home pantoprazole.    Chronic diastolic heart failure  Fluid removal with dialysis.    History of Intracerebral Hemorrhage: L BG 5/2013; R BG 9/2016; R BG 11/2016; L caudate head 2/2017  Gastrostomy status  Aphasia  Seizure disorder as sequela of cerebrovascular accident  Continue home levetiracetam. Turn every 2 hours to avoid new decubitus ulcers.    History of left below knee amputation 12/18/13  Turn patient every 2 hours.    Peripheral vascular disease in diabetes mellitus  Not on medications.    Dyslipidemia  Not on  medications.    Anemia in chronic kidney disease  Stable.      VTE Risk Mitigation (From admission, onward)         Ordered     IP VTE HIGH RISK PATIENT  Once      08/05/20 1446     Place sequential compression device  Until discontinued      08/05/20 1446                      Jono Hutchinson MD  Department of Hospital Medicine   [facility]   No

## (undated) DEVICE — OMNIPAQUE 350 200ML

## (undated) DEVICE — CATH ULTRAVERSE 035 7X60X75

## (undated) DEVICE — SHEATH PINNACLE 6FRX6CM

## (undated) DEVICE — STOPCOCK 3-WAY

## (undated) DEVICE — KIT POWER PULSE

## (undated) DEVICE — SEE MEDLINE ITEM 156894

## (undated) DEVICE — DRAPE OPTIMA MAJOR PEDIATRIC

## (undated) DEVICE — KIT MICROINTRO 4F .018X40X7CM

## (undated) DEVICE — CATH ANGIOJET PROXI-90CM

## (undated) DEVICE — CATH ULTRAVERSE 035 10X40X75

## (undated) DEVICE — INFLATOR ENCORE 26 BLLN INFL

## (undated) DEVICE — GUIDEWIRE STF .035X180CM ANG

## (undated) DEVICE — VISE RADIFOCUS MULTI TORQUE

## (undated) DEVICE — CATH GLIDE ANGLED 5FR 65CM